# Patient Record
Sex: FEMALE | Race: BLACK OR AFRICAN AMERICAN | Employment: OTHER | URBAN - METROPOLITAN AREA
[De-identification: names, ages, dates, MRNs, and addresses within clinical notes are randomized per-mention and may not be internally consistent; named-entity substitution may affect disease eponyms.]

---

## 2017-12-02 PROBLEM — S05.02XA LEFT CORNEAL ABRASION: Status: ACTIVE | Noted: 2017-12-02

## 2018-01-02 PROBLEM — E10.10 DKA, TYPE 1, NOT AT GOAL (HCC): Status: ACTIVE | Noted: 2018-01-02

## 2018-01-05 PROBLEM — S75.001A COMMON FEMORAL ARTERY INJURY, RIGHT, INITIAL ENCOUNTER: Status: ACTIVE | Noted: 2018-01-05

## 2018-01-05 PROBLEM — L76.82 BLEEDING AT INSERTION SITE: Status: ACTIVE | Noted: 2018-01-05

## 2018-04-11 ENCOUNTER — TELEPHONE (OUTPATIENT)
Dept: OBGYN | Age: 23
End: 2018-04-11

## 2018-04-12 ENCOUNTER — TELEPHONE (OUTPATIENT)
Dept: OBGYN | Age: 23
End: 2018-04-12

## 2018-04-17 ENCOUNTER — OFFICE VISIT (OUTPATIENT)
Dept: OBGYN | Age: 23
End: 2018-04-17
Payer: COMMERCIAL

## 2018-04-17 ENCOUNTER — TELEPHONE (OUTPATIENT)
Dept: OBGYN | Age: 23
End: 2018-04-17

## 2018-04-17 ENCOUNTER — HOSPITAL ENCOUNTER (OUTPATIENT)
Age: 23
Discharge: HOME | End: 2018-04-19
Payer: COMMERCIAL

## 2018-04-17 VITALS
HEART RATE: 123 BPM | WEIGHT: 118 LBS | DIASTOLIC BLOOD PRESSURE: 81 MMHG | HEIGHT: 61 IN | RESPIRATION RATE: 14 BRPM | SYSTOLIC BLOOD PRESSURE: 116 MMHG | TEMPERATURE: 99.7 F | BODY MASS INDEX: 22.28 KG/M2

## 2018-04-17 DIAGNOSIS — N89.8 VAGINA ITCHING: ICD-10-CM

## 2018-04-17 DIAGNOSIS — N76.0 BACTERIAL VAGINAL INFECTION: Primary | ICD-10-CM

## 2018-04-17 DIAGNOSIS — B96.89 BACTERIAL VAGINAL INFECTION: Primary | ICD-10-CM

## 2018-04-17 DIAGNOSIS — Z11.3 ROUTINE SCREENING FOR STI (SEXUALLY TRANSMITTED INFECTION): ICD-10-CM

## 2018-04-17 DIAGNOSIS — R30.0 DYSURIA: Primary | ICD-10-CM

## 2018-04-17 DIAGNOSIS — B37.31 VULVOVAGINAL CANDIDIASIS: ICD-10-CM

## 2018-04-17 LAB
BILIRUBIN, POC: NEGATIVE
BLOOD URINE, POC: NEGATIVE
CLARITY, POC: CLEAR
CLUE CELLS: ABNORMAL
COLOR, POC: YELLOW
GLUCOSE URINE, POC: 250
KETONES, POC: NEGATIVE
LEUKOCYTE EST, POC: ABNORMAL
NITRITE, POC: NEGATIVE
PH, POC: 6
PROTEIN, POC: NEGATIVE
SOURCE WET PREP: ABNORMAL
SPECIFIC GRAVITY, POC: 1.02
TRICHOMONAS PREP: ABNORMAL
UROBILINOGEN, POC: NEGATIVE
YEAST WET PREP: ABNORMAL

## 2018-04-17 PROCEDURE — 99213 OFFICE O/P EST LOW 20 MIN: CPT | Performed by: NURSE PRACTITIONER

## 2018-04-17 PROCEDURE — 1036F TOBACCO NON-USER: CPT | Performed by: NURSE PRACTITIONER

## 2018-04-17 PROCEDURE — 87491 CHLMYD TRACH DNA AMP PROBE: CPT

## 2018-04-17 PROCEDURE — G8420 CALC BMI NORM PARAMETERS: HCPCS | Performed by: NURSE PRACTITIONER

## 2018-04-17 PROCEDURE — 87210 SMEAR WET MOUNT SALINE/INK: CPT | Performed by: NURSE PRACTITIONER

## 2018-04-17 PROCEDURE — 99213 OFFICE O/P EST LOW 20 MIN: CPT

## 2018-04-17 PROCEDURE — 87186 SC STD MICRODIL/AGAR DIL: CPT

## 2018-04-17 PROCEDURE — 81002 URINALYSIS NONAUTO W/O SCOPE: CPT | Performed by: NURSE PRACTITIONER

## 2018-04-17 PROCEDURE — 87591 N.GONORRHOEAE DNA AMP PROB: CPT

## 2018-04-17 PROCEDURE — G8427 DOCREV CUR MEDS BY ELIG CLIN: HCPCS | Performed by: NURSE PRACTITIONER

## 2018-04-17 PROCEDURE — 87210 SMEAR WET MOUNT SALINE/INK: CPT

## 2018-04-17 PROCEDURE — 87088 URINE BACTERIA CULTURE: CPT

## 2018-04-17 RX ORDER — FLUCONAZOLE 150 MG/1
150 TABLET ORAL
Qty: 1 TABLET | Refills: 0 | Status: SHIPPED | OUTPATIENT
Start: 2018-04-17 | End: 2018-04-24

## 2018-04-17 RX ORDER — NITROFURANTOIN 25; 75 MG/1; MG/1
100 CAPSULE ORAL 2 TIMES DAILY
Qty: 20 CAPSULE | Refills: 0 | Status: SHIPPED | OUTPATIENT
Start: 2018-04-17 | End: 2018-04-27

## 2018-04-17 RX ORDER — NYSTATIN 100000 U/G
CREAM TOPICAL
Qty: 1 TUBE | Refills: 0 | Status: SHIPPED | OUTPATIENT
Start: 2018-04-17 | End: 2018-08-01 | Stop reason: ALTCHOICE

## 2018-04-17 RX ORDER — METRONIDAZOLE 500 MG/1
500 TABLET ORAL 2 TIMES DAILY
Qty: 14 TABLET | Refills: 0 | Status: SHIPPED | OUTPATIENT
Start: 2018-04-17 | End: 2018-04-24

## 2018-04-17 ASSESSMENT — ENCOUNTER SYMPTOMS: GASTROINTESTINAL NEGATIVE: 1

## 2018-04-18 ENCOUNTER — TELEPHONE (OUTPATIENT)
Dept: OBGYN | Age: 23
End: 2018-04-18

## 2018-04-19 LAB
ORGANISM: ABNORMAL
URINE CULTURE, ROUTINE: ABNORMAL
URINE CULTURE, ROUTINE: ABNORMAL

## 2018-04-23 ENCOUNTER — TELEPHONE (OUTPATIENT)
Dept: OBGYN | Age: 23
End: 2018-04-23

## 2018-04-23 LAB
CHLAMYDIA TRACHOMATIS AMPLIFIED DET: ABNORMAL
ORGANISM: ABNORMAL

## 2018-04-24 ENCOUNTER — TELEPHONE (OUTPATIENT)
Dept: OBGYN | Age: 23
End: 2018-04-24

## 2018-04-24 ENCOUNTER — NURSE ONLY (OUTPATIENT)
Dept: OBGYN | Age: 23
End: 2018-04-24
Payer: COMMERCIAL

## 2018-04-24 VITALS
HEIGHT: 61 IN | WEIGHT: 117 LBS | DIASTOLIC BLOOD PRESSURE: 76 MMHG | SYSTOLIC BLOOD PRESSURE: 119 MMHG | BODY MASS INDEX: 22.09 KG/M2 | TEMPERATURE: 97.4 F

## 2018-04-24 DIAGNOSIS — A54.9 GC (GONOCOCCUS INFECTION): Primary | ICD-10-CM

## 2018-04-24 PROCEDURE — 99212 OFFICE O/P EST SF 10 MIN: CPT

## 2018-04-24 PROCEDURE — 2580000003 HC RX 258

## 2018-04-24 PROCEDURE — 6370000000 HC RX 637 (ALT 250 FOR IP)

## 2018-04-24 PROCEDURE — 99211 OFF/OP EST MAY X REQ PHY/QHP: CPT | Performed by: OBSTETRICS & GYNECOLOGY

## 2018-04-24 PROCEDURE — 6360000002 HC RX W HCPCS

## 2018-04-24 RX ORDER — AZITHROMYCIN 250 MG/1
1000 TABLET, FILM COATED ORAL ONCE
Status: COMPLETED | OUTPATIENT
Start: 2018-04-24 | End: 2018-04-24

## 2018-04-24 RX ORDER — CEFTRIAXONE SODIUM 250 MG/1
250 INJECTION, POWDER, FOR SOLUTION INTRAMUSCULAR; INTRAVENOUS ONCE
Status: COMPLETED | OUTPATIENT
Start: 2018-04-24 | End: 2018-04-24

## 2018-04-24 RX ADMIN — CEFTRIAXONE SODIUM 250 MG: 250 INJECTION, POWDER, FOR SOLUTION INTRAMUSCULAR; INTRAVENOUS at 16:04

## 2018-04-24 RX ADMIN — AZITHROMYCIN 1000 MG: 250 TABLET, FILM COATED ORAL at 16:03

## 2018-05-07 ENCOUNTER — APPOINTMENT (OUTPATIENT)
Dept: GENERAL RADIOLOGY | Age: 23
DRG: 566 | End: 2018-05-07
Payer: COMMERCIAL

## 2018-05-07 ENCOUNTER — HOSPITAL ENCOUNTER (INPATIENT)
Age: 23
LOS: 2 days | Discharge: HOME | DRG: 566 | End: 2018-05-09
Attending: EMERGENCY MEDICINE | Admitting: INTERNAL MEDICINE
Payer: COMMERCIAL

## 2018-05-07 ENCOUNTER — APPOINTMENT (OUTPATIENT)
Dept: ULTRASOUND IMAGING | Age: 23
DRG: 566 | End: 2018-05-07
Payer: COMMERCIAL

## 2018-05-07 DIAGNOSIS — E10.10 DIABETIC KETOACIDOSIS WITHOUT COMA ASSOCIATED WITH TYPE 1 DIABETES MELLITUS (HCC): Primary | ICD-10-CM

## 2018-05-07 DIAGNOSIS — O03.9 SPONTANEOUS ABORTION: ICD-10-CM

## 2018-05-07 DIAGNOSIS — E87.29 HIGH ANION GAP METABOLIC ACIDOSIS: ICD-10-CM

## 2018-05-07 DIAGNOSIS — Z34.90 PREGNANCY, UNSPECIFIED GESTATIONAL AGE: ICD-10-CM

## 2018-05-07 PROBLEM — Z32.01 PREGNANCY TEST POSITIVE: Status: ACTIVE | Noted: 2018-05-07

## 2018-05-07 PROBLEM — L76.82 BLEEDING AT INSERTION SITE: Status: RESOLVED | Noted: 2018-01-05 | Resolved: 2018-05-07

## 2018-05-07 PROBLEM — S75.001A COMMON FEMORAL ARTERY INJURY, RIGHT, INITIAL ENCOUNTER: Status: RESOLVED | Noted: 2018-01-05 | Resolved: 2018-05-07

## 2018-05-07 PROBLEM — S05.02XA LEFT CORNEAL ABRASION: Status: RESOLVED | Noted: 2017-12-02 | Resolved: 2018-05-07

## 2018-05-07 LAB
ABO/RH: NORMAL
ALBUMIN SERPL-MCNC: 3.8 G/DL (ref 3.5–5.2)
ALP BLD-CCNC: 72 U/L (ref 35–104)
ALT SERPL-CCNC: 8 U/L (ref 0–32)
ANION GAP SERPL CALCULATED.3IONS-SCNC: 13 MMOL/L (ref 7–16)
ANION GAP SERPL CALCULATED.3IONS-SCNC: 14 MMOL/L (ref 7–16)
ANION GAP SERPL CALCULATED.3IONS-SCNC: 20 MMOL/L (ref 7–16)
ANION GAP SERPL CALCULATED.3IONS-SCNC: 25 MMOL/L (ref 7–16)
ANION GAP SERPL CALCULATED.3IONS-SCNC: 28 MMOL/L (ref 7–16)
ANTIBODY SCREEN: NORMAL
AST SERPL-CCNC: 11 U/L (ref 0–31)
BACTERIA: ABNORMAL /HPF
BASOPHILS ABSOLUTE: 0.04 E9/L (ref 0–0.2)
BASOPHILS RELATIVE PERCENT: 0.3 % (ref 0–2)
BETA-HYDROXYBUTYRATE: >4.5 MMOL/L (ref 0.02–0.27)
BILIRUB SERPL-MCNC: 0.2 MG/DL (ref 0–1.2)
BILIRUBIN URINE: NEGATIVE
BLOOD, URINE: ABNORMAL
BUN BLDV-MCNC: 13 MG/DL (ref 6–20)
BUN BLDV-MCNC: 16 MG/DL (ref 6–20)
BUN BLDV-MCNC: 5 MG/DL (ref 6–20)
BUN BLDV-MCNC: 6 MG/DL (ref 6–20)
BUN BLDV-MCNC: 8 MG/DL (ref 6–20)
CALCIUM SERPL-MCNC: 7.8 MG/DL (ref 8.6–10.2)
CALCIUM SERPL-MCNC: 8 MG/DL (ref 8.6–10.2)
CALCIUM SERPL-MCNC: 8 MG/DL (ref 8.6–10.2)
CALCIUM SERPL-MCNC: 8.4 MG/DL (ref 8.6–10.2)
CALCIUM SERPL-MCNC: 9.1 MG/DL (ref 8.6–10.2)
CHLORIDE BLD-SCNC: 104 MMOL/L (ref 98–107)
CHLORIDE BLD-SCNC: 105 MMOL/L (ref 98–107)
CHLORIDE BLD-SCNC: 105 MMOL/L (ref 98–107)
CHLORIDE BLD-SCNC: 106 MMOL/L (ref 98–107)
CHLORIDE BLD-SCNC: 97 MMOL/L (ref 98–107)
CHP ED QC CHECK: NORMAL
CHP ED QC CHECK: YES
CHP ED QC CHECK: YES
CLARITY: CLEAR
CO2: 11 MMOL/L (ref 22–29)
CO2: 11 MMOL/L (ref 22–29)
CO2: 15 MMOL/L (ref 22–29)
CO2: 17 MMOL/L (ref 22–29)
CO2: 9 MMOL/L (ref 22–29)
COLOR: ABNORMAL
CREAT SERPL-MCNC: 0.5 MG/DL (ref 0.5–1)
CREAT SERPL-MCNC: 0.6 MG/DL (ref 0.5–1)
EKG ATRIAL RATE: 110 BPM
EKG P AXIS: 87 DEGREES
EKG P-R INTERVAL: 136 MS
EKG Q-T INTERVAL: 336 MS
EKG QRS DURATION: 70 MS
EKG QTC CALCULATION (BAZETT): 454 MS
EKG R AXIS: 63 DEGREES
EKG T AXIS: 18 DEGREES
EKG VENTRICULAR RATE: 110 BPM
EOSINOPHILS ABSOLUTE: 0.01 E9/L (ref 0.05–0.5)
EOSINOPHILS RELATIVE PERCENT: 0.1 % (ref 0–6)
EPITHELIAL CELLS, UA: ABNORMAL /HPF
GFR AFRICAN AMERICAN: >60
GFR NON-AFRICAN AMERICAN: >60 ML/MIN/1.73
GLUCOSE BLD-MCNC: 121 MG/DL (ref 74–109)
GLUCOSE BLD-MCNC: 172 MG/DL (ref 74–109)
GLUCOSE BLD-MCNC: 223 MG/DL
GLUCOSE BLD-MCNC: 225 MG/DL
GLUCOSE BLD-MCNC: 235 MG/DL (ref 74–109)
GLUCOSE BLD-MCNC: 239 MG/DL
GLUCOSE BLD-MCNC: 253 MG/DL
GLUCOSE BLD-MCNC: 258 MG/DL
GLUCOSE BLD-MCNC: 309 MG/DL (ref 74–109)
GLUCOSE BLD-MCNC: 317 MG/DL (ref 74–109)
GLUCOSE BLD-MCNC: 320 MG/DL (ref 74–109)
GLUCOSE URINE: 500 MG/DL
GONADOTROPIN, CHORIONIC (HCG) QUANT: 5236 MIU/ML
HBA1C MFR BLD: 14.3 % (ref 4.8–5.9)
HCT VFR BLD CALC: 38 % (ref 34–48)
HEMOGLOBIN: 12.6 G/DL (ref 11.5–15.5)
IMMATURE GRANULOCYTES #: 0.04 E9/L
IMMATURE GRANULOCYTES %: 0.3 % (ref 0–5)
KETONES, URINE: >=80 MG/DL
LACTIC ACID: 0.9 MMOL/L (ref 0.5–2.2)
LEUKOCYTE ESTERASE, URINE: ABNORMAL
LIPASE: 8 U/L (ref 13–60)
LYMPHOCYTES ABSOLUTE: 1.7 E9/L (ref 1.5–4)
LYMPHOCYTES RELATIVE PERCENT: 13.3 % (ref 20–42)
MAGNESIUM: 1.4 MG/DL (ref 1.6–2.6)
MAGNESIUM: 1.5 MG/DL (ref 1.6–2.6)
MAGNESIUM: 1.7 MG/DL (ref 1.6–2.6)
MAGNESIUM: 1.9 MG/DL (ref 1.6–2.6)
MAGNESIUM: 2.3 MG/DL (ref 1.6–2.6)
MCH RBC QN AUTO: 25.3 PG (ref 26–35)
MCHC RBC AUTO-ENTMCNC: 33.2 % (ref 32–34.5)
MCV RBC AUTO: 76.3 FL (ref 80–99.9)
METER GLUCOSE: 113 MG/DL (ref 70–110)
METER GLUCOSE: 126 MG/DL (ref 70–110)
METER GLUCOSE: 126 MG/DL (ref 70–110)
METER GLUCOSE: 127 MG/DL (ref 70–110)
METER GLUCOSE: 127 MG/DL (ref 70–110)
METER GLUCOSE: 148 MG/DL (ref 70–110)
METER GLUCOSE: 163 MG/DL (ref 70–110)
METER GLUCOSE: 166 MG/DL (ref 70–110)
METER GLUCOSE: 168 MG/DL (ref 70–110)
METER GLUCOSE: 223 MG/DL (ref 70–110)
METER GLUCOSE: 225 MG/DL (ref 70–110)
METER GLUCOSE: 239 MG/DL (ref 70–110)
METER GLUCOSE: 253 MG/DL (ref 70–110)
METER GLUCOSE: 258 MG/DL (ref 70–110)
MONOCYTES ABSOLUTE: 0.41 E9/L (ref 0.1–0.95)
MONOCYTES RELATIVE PERCENT: 3.2 % (ref 2–12)
NEUTROPHILS ABSOLUTE: 10.57 E9/L (ref 1.8–7.3)
NEUTROPHILS RELATIVE PERCENT: 82.8 % (ref 43–80)
NITRITE, URINE: NEGATIVE
PDW BLD-RTO: 18.4 FL (ref 11.5–15)
PH UA: 6 (ref 5–9)
PH VENOUS: 7.24 (ref 7.3–7.42)
PHOSPHORUS: 2.5 MG/DL (ref 2.5–4.5)
PHOSPHORUS: 2.6 MG/DL (ref 2.5–4.5)
PHOSPHORUS: 2.7 MG/DL (ref 2.5–4.5)
PHOSPHORUS: 3.5 MG/DL (ref 2.5–4.5)
PHOSPHORUS: 4.3 MG/DL (ref 2.5–4.5)
PLATELET # BLD: 259 E9/L (ref 130–450)
PMV BLD AUTO: 11.7 FL (ref 7–12)
POC CHLORIDE: 113 MMOL/L (ref 100–108)
POC CREATININE: 0.5 MG/DL (ref 0.5–1)
POC POTASSIUM: 3.9 MMOL/L (ref 3.5–5)
POC SODIUM: 135 MMOL/L (ref 132–146)
POTASSIUM SERPL-SCNC: 3.7 MMOL/L (ref 3.5–5)
POTASSIUM SERPL-SCNC: 3.9 MMOL/L (ref 3.5–5)
POTASSIUM SERPL-SCNC: 4.1 MMOL/L (ref 3.5–5)
POTASSIUM SERPL-SCNC: 4.3 MMOL/L (ref 3.5–5)
POTASSIUM SERPL-SCNC: 4.3 MMOL/L (ref 3.5–5)
PREGNANCY TEST URINE, POC: NORMAL
PROTEIN UA: 30 MG/DL
RBC # BLD: 4.98 E12/L (ref 3.5–5.5)
RBC UA: ABNORMAL /HPF (ref 0–2)
RHIG LOT NUMBER: NORMAL
SODIUM BLD-SCNC: 135 MMOL/L (ref 132–146)
SODIUM BLD-SCNC: 135 MMOL/L (ref 132–146)
SODIUM BLD-SCNC: 136 MMOL/L (ref 132–146)
SODIUM BLD-SCNC: 136 MMOL/L (ref 132–146)
SODIUM BLD-SCNC: 138 MMOL/L (ref 132–146)
SPECIFIC GRAVITY UA: >=1.03 (ref 1–1.03)
TOTAL PROTEIN: 7 G/DL (ref 6.4–8.3)
UROBILINOGEN, URINE: 0.2 E.U./DL
WBC # BLD: 12.8 E9/L (ref 4.5–11.5)
WBC UA: ABNORMAL /HPF (ref 0–5)

## 2018-05-07 PROCEDURE — 83690 ASSAY OF LIPASE: CPT

## 2018-05-07 PROCEDURE — 82435 ASSAY OF BLOOD CHLORIDE: CPT

## 2018-05-07 PROCEDURE — 85025 COMPLETE CBC W/AUTO DIFF WBC: CPT

## 2018-05-07 PROCEDURE — 96375 TX/PRO/DX INJ NEW DRUG ADDON: CPT

## 2018-05-07 PROCEDURE — 6360000002 HC RX W HCPCS: Performed by: EMERGENCY MEDICINE

## 2018-05-07 PROCEDURE — 86901 BLOOD TYPING SEROLOGIC RH(D): CPT

## 2018-05-07 PROCEDURE — 71045 X-RAY EXAM CHEST 1 VIEW: CPT

## 2018-05-07 PROCEDURE — 6370000000 HC RX 637 (ALT 250 FOR IP): Performed by: INTERNAL MEDICINE

## 2018-05-07 PROCEDURE — 6360000002 HC RX W HCPCS

## 2018-05-07 PROCEDURE — 76817 TRANSVAGINAL US OBSTETRIC: CPT

## 2018-05-07 PROCEDURE — 80048 BASIC METABOLIC PNL TOTAL CA: CPT

## 2018-05-07 PROCEDURE — 87088 URINE BACTERIA CULTURE: CPT

## 2018-05-07 PROCEDURE — 6370000000 HC RX 637 (ALT 250 FOR IP): Performed by: EMERGENCY MEDICINE

## 2018-05-07 PROCEDURE — 93005 ELECTROCARDIOGRAM TRACING: CPT | Performed by: EMERGENCY MEDICINE

## 2018-05-07 PROCEDURE — 2500000003 HC RX 250 WO HCPCS: Performed by: EMERGENCY MEDICINE

## 2018-05-07 PROCEDURE — 82565 ASSAY OF CREATININE: CPT

## 2018-05-07 PROCEDURE — 94761 N-INVAS EAR/PLS OXIMETRY MLT: CPT

## 2018-05-07 PROCEDURE — 36415 COLL VENOUS BLD VENIPUNCTURE: CPT

## 2018-05-07 PROCEDURE — 99222 1ST HOSP IP/OBS MODERATE 55: CPT | Performed by: INTERNAL MEDICINE

## 2018-05-07 PROCEDURE — 87491 CHLMYD TRACH DNA AMP PROBE: CPT

## 2018-05-07 PROCEDURE — 99291 CRITICAL CARE FIRST HOUR: CPT

## 2018-05-07 PROCEDURE — 83735 ASSAY OF MAGNESIUM: CPT

## 2018-05-07 PROCEDURE — 86703 HIV-1/HIV-2 1 RESULT ANTBDY: CPT

## 2018-05-07 PROCEDURE — 83036 HEMOGLOBIN GLYCOSYLATED A1C: CPT

## 2018-05-07 PROCEDURE — 96365 THER/PROPH/DIAG IV INF INIT: CPT

## 2018-05-07 PROCEDURE — 96368 THER/DIAG CONCURRENT INF: CPT

## 2018-05-07 PROCEDURE — 2580000003 HC RX 258: Performed by: EMERGENCY MEDICINE

## 2018-05-07 PROCEDURE — 96372 THER/PROPH/DIAG INJ SC/IM: CPT

## 2018-05-07 PROCEDURE — 82947 ASSAY GLUCOSE BLOOD QUANT: CPT

## 2018-05-07 PROCEDURE — 82800 BLOOD PH: CPT

## 2018-05-07 PROCEDURE — 96366 THER/PROPH/DIAG IV INF ADDON: CPT

## 2018-05-07 PROCEDURE — 82962 GLUCOSE BLOOD TEST: CPT

## 2018-05-07 PROCEDURE — 81001 URINALYSIS AUTO W/SCOPE: CPT

## 2018-05-07 PROCEDURE — 86900 BLOOD TYPING SEROLOGIC ABO: CPT

## 2018-05-07 PROCEDURE — 84100 ASSAY OF PHOSPHORUS: CPT

## 2018-05-07 PROCEDURE — 86592 SYPHILIS TEST NON-TREP QUAL: CPT

## 2018-05-07 PROCEDURE — 86850 RBC ANTIBODY SCREEN: CPT

## 2018-05-07 PROCEDURE — 84295 ASSAY OF SERUM SODIUM: CPT

## 2018-05-07 PROCEDURE — 84132 ASSAY OF SERUM POTASSIUM: CPT

## 2018-05-07 PROCEDURE — 83605 ASSAY OF LACTIC ACID: CPT

## 2018-05-07 PROCEDURE — 96376 TX/PRO/DX INJ SAME DRUG ADON: CPT

## 2018-05-07 PROCEDURE — 82010 KETONE BODYS QUAN: CPT

## 2018-05-07 PROCEDURE — 80053 COMPREHEN METABOLIC PANEL: CPT

## 2018-05-07 PROCEDURE — 2000000000 HC ICU R&B

## 2018-05-07 PROCEDURE — 87591 N.GONORRHOEAE DNA AMP PROB: CPT

## 2018-05-07 PROCEDURE — 2580000003 HC RX 258: Performed by: INTERNAL MEDICINE

## 2018-05-07 PROCEDURE — 84702 CHORIONIC GONADOTROPIN TEST: CPT

## 2018-05-07 RX ORDER — 0.9 % SODIUM CHLORIDE 0.9 %
1000 INTRAVENOUS SOLUTION INTRAVENOUS ONCE
Status: COMPLETED | OUTPATIENT
Start: 2018-05-07 | End: 2018-05-07

## 2018-05-07 RX ORDER — SODIUM CHLORIDE 9 MG/ML
INJECTION, SOLUTION INTRAVENOUS CONTINUOUS
Status: DISCONTINUED | OUTPATIENT
Start: 2018-05-07 | End: 2018-05-09 | Stop reason: HOSPADM

## 2018-05-07 RX ORDER — NICOTINE POLACRILEX 4 MG
15 LOZENGE BUCCAL PRN
Status: DISCONTINUED | OUTPATIENT
Start: 2018-05-07 | End: 2018-05-09 | Stop reason: HOSPADM

## 2018-05-07 RX ORDER — MAGNESIUM SULFATE 1 G/100ML
1 INJECTION INTRAVENOUS PRN
Status: DISCONTINUED | OUTPATIENT
Start: 2018-05-07 | End: 2018-05-09 | Stop reason: HOSPADM

## 2018-05-07 RX ORDER — 0.9 % SODIUM CHLORIDE 0.9 %
15 INTRAVENOUS SOLUTION INTRAVENOUS ONCE
Status: COMPLETED | OUTPATIENT
Start: 2018-05-07 | End: 2018-05-07

## 2018-05-07 RX ORDER — MORPHINE SULFATE 2 MG/ML
2 INJECTION, SOLUTION INTRAMUSCULAR; INTRAVENOUS ONCE
Status: COMPLETED | OUTPATIENT
Start: 2018-05-08 | End: 2018-05-07

## 2018-05-07 RX ORDER — MORPHINE SULFATE 2 MG/ML
2 INJECTION, SOLUTION INTRAMUSCULAR; INTRAVENOUS ONCE
Status: COMPLETED | OUTPATIENT
Start: 2018-05-07 | End: 2018-05-07

## 2018-05-07 RX ORDER — DEXTROSE MONOHYDRATE 25 G/50ML
12.5 INJECTION, SOLUTION INTRAVENOUS PRN
Status: DISCONTINUED | OUTPATIENT
Start: 2018-05-07 | End: 2018-05-09 | Stop reason: HOSPADM

## 2018-05-07 RX ORDER — DEXTROSE MONOHYDRATE 50 MG/ML
100 INJECTION, SOLUTION INTRAVENOUS PRN
Status: DISCONTINUED | OUTPATIENT
Start: 2018-05-07 | End: 2018-05-09 | Stop reason: HOSPADM

## 2018-05-07 RX ORDER — DEXTROSE, SODIUM CHLORIDE, AND POTASSIUM CHLORIDE 5; .45; .15 G/100ML; G/100ML; G/100ML
INJECTION INTRAVENOUS CONTINUOUS PRN
Status: DISCONTINUED | OUTPATIENT
Start: 2018-05-07 | End: 2018-05-09 | Stop reason: HOSPADM

## 2018-05-07 RX ORDER — INSULIN GLARGINE 100 [IU]/ML
30 INJECTION, SOLUTION SUBCUTANEOUS NIGHTLY
Status: DISCONTINUED | OUTPATIENT
Start: 2018-05-07 | End: 2018-05-09 | Stop reason: HOSPADM

## 2018-05-07 RX ORDER — MORPHINE SULFATE 2 MG/ML
INJECTION, SOLUTION INTRAMUSCULAR; INTRAVENOUS
Status: COMPLETED
Start: 2018-05-07 | End: 2018-05-07

## 2018-05-07 RX ORDER — SODIUM CHLORIDE 0.9 % (FLUSH) 0.9 %
10 SYRINGE (ML) INJECTION PRN
Status: DISCONTINUED | OUTPATIENT
Start: 2018-05-07 | End: 2018-05-09 | Stop reason: HOSPADM

## 2018-05-07 RX ORDER — MAGNESIUM SULFATE IN WATER 40 MG/ML
2 INJECTION, SOLUTION INTRAVENOUS
Status: DISCONTINUED | OUTPATIENT
Start: 2018-05-07 | End: 2018-05-07

## 2018-05-07 RX ORDER — POTASSIUM CHLORIDE 7.45 MG/ML
10 INJECTION INTRAVENOUS PRN
Status: DISCONTINUED | OUTPATIENT
Start: 2018-05-07 | End: 2018-05-09 | Stop reason: HOSPADM

## 2018-05-07 RX ORDER — SODIUM CHLORIDE 0.9 % (FLUSH) 0.9 %
10 SYRINGE (ML) INJECTION 2 TIMES DAILY
Status: DISCONTINUED | OUTPATIENT
Start: 2018-05-07 | End: 2018-05-09 | Stop reason: HOSPADM

## 2018-05-07 RX ORDER — MORPHINE SULFATE 4 MG/ML
4 INJECTION, SOLUTION INTRAMUSCULAR; INTRAVENOUS ONCE
Status: COMPLETED | OUTPATIENT
Start: 2018-05-07 | End: 2018-05-07

## 2018-05-07 RX ORDER — 0.9 % SODIUM CHLORIDE 0.9 %
30 INTRAVENOUS SOLUTION INTRAVENOUS ONCE
Status: COMPLETED | OUTPATIENT
Start: 2018-05-07 | End: 2018-05-07

## 2018-05-07 RX ORDER — ONDANSETRON 2 MG/ML
4 INJECTION INTRAMUSCULAR; INTRAVENOUS ONCE
Status: COMPLETED | OUTPATIENT
Start: 2018-05-07 | End: 2018-05-07

## 2018-05-07 RX ADMIN — HUMAN RHO(D) IMMUNE GLOBULIN 300 MCG: 300 INJECTION, SOLUTION INTRAMUSCULAR at 10:16

## 2018-05-07 RX ADMIN — SODIUM CHLORIDE 5.8 UNITS/HR: 9 INJECTION, SOLUTION INTRAVENOUS at 11:02

## 2018-05-07 RX ADMIN — DEXTROSE MONOHYDRATE, SODIUM CHLORIDE, AND POTASSIUM CHLORIDE: 50; 4.5; 1.49 INJECTION, SOLUTION INTRAVENOUS at 15:50

## 2018-05-07 RX ADMIN — POTASSIUM CHLORIDE 10 MEQ: 10 INJECTION, SOLUTION INTRAVENOUS at 21:50

## 2018-05-07 RX ADMIN — MAGNESIUM SULFATE HEPTAHYDRATE 1 G: 1 INJECTION, SOLUTION INTRAVENOUS at 17:44

## 2018-05-07 RX ADMIN — DEXTROSE MONOHYDRATE, SODIUM CHLORIDE, AND POTASSIUM CHLORIDE: 50; 4.5; 1.49 INJECTION, SOLUTION INTRAVENOUS at 09:31

## 2018-05-07 RX ADMIN — MAGNESIUM SULFATE HEPTAHYDRATE 1 G: 1 INJECTION, SOLUTION INTRAVENOUS at 16:49

## 2018-05-07 RX ADMIN — MORPHINE SULFATE 2 MG: 2 INJECTION, SOLUTION INTRAMUSCULAR; INTRAVENOUS at 07:28

## 2018-05-07 RX ADMIN — ONDANSETRON 4 MG: 2 INJECTION INTRAMUSCULAR; INTRAVENOUS at 05:25

## 2018-05-07 RX ADMIN — MORPHINE SULFATE 4 MG: 4 INJECTION INTRAVENOUS at 05:25

## 2018-05-07 RX ADMIN — POTASSIUM CHLORIDE 10 MEQ: 10 INJECTION, SOLUTION INTRAVENOUS at 08:27

## 2018-05-07 RX ADMIN — INSULIN GLARGINE 30 UNITS: 100 INJECTION, SOLUTION SUBCUTANEOUS at 21:30

## 2018-05-07 RX ADMIN — POTASSIUM CHLORIDE 10 MEQ: 10 INJECTION, SOLUTION INTRAVENOUS at 09:05

## 2018-05-07 RX ADMIN — POTASSIUM CHLORIDE 10 MEQ: 10 INJECTION, SOLUTION INTRAVENOUS at 21:16

## 2018-05-07 RX ADMIN — SODIUM CHLORIDE 783 ML: 9 INJECTION, SOLUTION INTRAVENOUS at 09:39

## 2018-05-07 RX ADMIN — SODIUM CHLORIDE 1566 ML: 9 INJECTION, SOLUTION INTRAVENOUS at 06:59

## 2018-05-07 RX ADMIN — SODIUM CHLORIDE 1000 ML: 9 INJECTION, SOLUTION INTRAVENOUS at 08:08

## 2018-05-07 RX ADMIN — Medication 10 ML: at 20:35

## 2018-05-07 RX ADMIN — POTASSIUM CHLORIDE 10 MEQ: 10 INJECTION, SOLUTION INTRAVENOUS at 09:40

## 2018-05-07 RX ADMIN — MORPHINE SULFATE 2 MG: 2 INJECTION, SOLUTION INTRAMUSCULAR; INTRAVENOUS at 23:58

## 2018-05-07 RX ADMIN — Medication 10 ML: at 15:44

## 2018-05-07 RX ADMIN — Medication 10 ML: at 18:48

## 2018-05-07 RX ADMIN — POTASSIUM CHLORIDE 10 MEQ: 10 INJECTION, SOLUTION INTRAVENOUS at 22:34

## 2018-05-07 RX ADMIN — CEFTRIAXONE SODIUM 1 G: 1 INJECTION, POWDER, FOR SOLUTION INTRAMUSCULAR; INTRAVENOUS at 09:41

## 2018-05-07 RX ADMIN — SODIUM CHLORIDE 5.2 UNITS/HR: 9 INJECTION, SOLUTION INTRAVENOUS at 08:22

## 2018-05-07 RX ADMIN — SODIUM PHOSPHATE, MONOBASIC, MONOHYDRATE 10 MMOL: 276; 142 INJECTION, SOLUTION INTRAVENOUS at 21:45

## 2018-05-07 RX ADMIN — Medication 10 ML: at 22:59

## 2018-05-07 ASSESSMENT — ENCOUNTER SYMPTOMS
VOMITING: 1
EYE REDNESS: 0
EYE DISCHARGE: 0
NAUSEA: 1
SINUS PRESSURE: 0
ABDOMINAL PAIN: 1
BACK PAIN: 0
COUGH: 0
WHEEZING: 0
SHORTNESS OF BREATH: 0
SORE THROAT: 0
ABDOMINAL DISTENTION: 0
EYE PAIN: 0
DIARRHEA: 0

## 2018-05-07 ASSESSMENT — PAIN DESCRIPTION - FREQUENCY
FREQUENCY: CONTINUOUS
FREQUENCY: CONTINUOUS

## 2018-05-07 ASSESSMENT — PAIN DESCRIPTION - ORIENTATION: ORIENTATION: LOWER

## 2018-05-07 ASSESSMENT — PAIN SCALES - GENERAL
PAINLEVEL_OUTOF10: 10
PAINLEVEL_OUTOF10: 9
PAINLEVEL_OUTOF10: 8
PAINLEVEL_OUTOF10: 0
PAINLEVEL_OUTOF10: 8
PAINLEVEL_OUTOF10: 0
PAINLEVEL_OUTOF10: 7
PAINLEVEL_OUTOF10: 7

## 2018-05-07 ASSESSMENT — PAIN DESCRIPTION - PROGRESSION: CLINICAL_PROGRESSION: NOT CHANGED

## 2018-05-07 ASSESSMENT — PAIN DESCRIPTION - LOCATION
LOCATION: ABDOMEN
LOCATION: ABDOMEN

## 2018-05-07 ASSESSMENT — PAIN DESCRIPTION - DESCRIPTORS
DESCRIPTORS: ACHING
DESCRIPTORS: ACHING

## 2018-05-07 ASSESSMENT — PAIN DESCRIPTION - PAIN TYPE
TYPE: ACUTE PAIN
TYPE: ACUTE PAIN

## 2018-05-07 NOTE — PROGRESS NOTES
Attending Physician Statement  I have discussed the case, including pertinent history and exam findings with the resident. I have seen and examined the patient and the key elements of the encounter have been performed by me. I agree with the assessment, plan and orders as documented by the resident. DKA - noncompliant and appears to be newly pregnant with US and 5K bhcg - avoid teratogenic agents - insulin drip started and pt sx much improved already - follow up GYN consult for high risk pregnancy. Bassem Chavez D.O.  Board Certified Internal Medicine   5/7/2018 1:51 PM

## 2018-05-07 NOTE — ED NOTES
Spoke with blood bank and they stated ID and labeled blood was acceptable and will be ran.      Kailee Dickinson, PAGE  05/07/18 2159

## 2018-05-07 NOTE — H&P
[facility]  Internal Medicine Residency Program  History and Physical    Patient:  Viviane Fitzpatrick 23 y.o. female MRN: [id number]     Date of Service: 5/7/2018          Chief complaint: vomiting, abdominal pain    History of Present Illness   The patient is a 23 y.o. female who came in for vomiting and abdominal pain. She has type 1 DM (since 7 yo, hx of noncompliance with hx of multiple admissions for DKA), recent gonococcal infection. She says she has not been taking her nightly insulin for about 3 days. 2 days ago, she started to have crampy abdominal pain associated with multiple episodes of vomiting, poor oral intake. She denies fever, cough, colds, chills, chest pain, SOB, focal weakness/changes in sensation, intake of meds apart from those prescribed for recent GC infection and UTI. Last admission for DKA was in January and her last a1c was 15. In the ED, patient was noted to be in DKA (elevated blood sugars, elevated betahydroxybutyrate, (+) AG, metabolic acidosis). Insulin protocol initiated. Ceftriaxone started for UTI. Her LMP was Feb 14, 2018. BetaHCG elevated. She did not know previously that she was pregnant. She has been experiencing vaginal bleeding since May 2. Past Medical History:  DM type 1    Past Surgical History:    History reviewed. No pertinent surgical history. Medications Prior to Admission:    Prior to Admission medications    Medication Sig Start Date End Date Taking? Authorizing Provider   nystatin (MYCOSTATIN) 199056 UNIT/GM cream Apply topically 2 times daily.  4/17/18  Yes MIKE Chaudhry CNM   lisinopril (PRINIVIL;ZESTRIL) 2.5 MG tablet Take 1 tablet by mouth daily 1/25/18  Yes Chandana Olson MD   insulin aspart (NOVOLOG) 100 UNIT/ML injection vial Take 4 units plus  **Corrective Low Dose Algorithm**  Glucose: Dose:               No Insulin  140-199 1 Unit  200-249 2 Units  250-299 3 Units  300-349 4 Units  350-399 5 Units  Over Soft. She exhibits no distension. There is no tenderness. There is no guarding. Musculoskeletal: She exhibits no edema or deformity. Lymphadenopathy:     She has no cervical adenopathy. Neurological: She is alert. Skin: Skin is warm. Labs and Imaging Studies     CBC:   Lab Results   Component Value Date    WBC 12.8 05/07/2018    RBC 4.98 05/07/2018    HGB 12.6 05/07/2018    HCT 38.0 05/07/2018    MCV 76.3 05/07/2018    RDW 18.4 05/07/2018     05/07/2018     CMP:  Lab Results   Component Value Date     05/07/2018    K 4.3 05/07/2018    K 4.2 01/24/2018     05/07/2018    CO2 11 05/07/2018    BUN 8 05/07/2018    PROT 7.0 05/07/2018     Betahydroxy butyrate > 4.5    BetaHCG - 5236    Imaging Studies:     Us Ob Transvaginal  Result Date: 5/7/2018  1. Anechoic collection in the lower uterine segment/upper cervix which may represent an early gestational sac, in the appropriate clinical scenario, or complex fluid collection. Recommend clinical correlation with serial beta hCG levels. 2. Unremarkable transvaginal sonographic evaluation of the right and left ovaries. Xr Chest Portable  Result Date: 5/7/2018  No radiographic evidence of acute cardiopulmonary disease. See above. Resident's Assessment and Plan     DM Type 1 in DKA 2/2 non-compliance  - Has not taken her long-acting insulin for about 3 day. - Admit to MICU and continue DKA protocol  - Labs per DKA protocol, replaced electrolytes prn  - rpt A1c  - NPO for now  - Bridge when AG closed x 2 with insulin glargine 30 u once a day    HAGMA from DKA  - Manage DKA as above    Early pregnancy  - OB-Gyn consult for high-risk pregnancy (Type 1 DM, currently with vaginal bleeding, US with early gestational sac)  - Obtain HIV, RPR  - Start folic acid once diet resumed  - Avoid teratogenics   - Will avoid scheduling anti-emetics/NSAIDs. Evaluate need for anti-emetics/NSAIDs prn.     UTI  - Asymptomatic but will treat in light of

## 2018-05-07 NOTE — ED NOTES
5:04 PM    I received this patient at sign out from Dr. Munir Thakkar   I have discussed the patient's initial exam, treatment and plan of care with the out going physician. I have introduced my self to the patient / family and have answered their questions to this point. I have examined the patient myself and reviewed ordered tests / medications and reviewed any available results to this point. If a resident is involved in the Emergency Department care, I have discussed my findings and plan with them as well. DKA  Critically ill  DKA protocol started  CVC placed by night doc and resident  Just found + pregnancy test today  LMP over 1 month ago  Vaginal bleeding but nothing here  US with +IUP and no FF  [facility]  She is RH neg, Rhogam given  ICU [facility] accepts  Dr. Weaver Sender to admit    CRITICAL CARE:  Please note that the withdrawal or failure to initiate urgent interventions for this patient would likely result in a life threatening deterioration or permanent disability. Accordingly this patient received 75 minutes of critical care time, excluding separately billable procedures.          Mary Jane Valdes MD  05/07/18 1633

## 2018-05-07 NOTE — H&P
[address] [facility]   Department of Internal Medicine   Internal Medicine Residency  MICU H&P Note    Patient:  Janey Renae 23 y.o. female   MRN: [id number]       Date of Service: 5/7/2018      Chief Complaint:  Vomiting, and lower abdominal pain    History of Present Illness   - The History is taken from patient    - The patient is a 23 y.o. female  w hx of Type I DM (since age of 8) on insulin, and recent GC + STD (treated with Rocephin and Azithromycin) presented to ED with cc of vomiting and lower abdominal pain. She started vomiting about 2 days ago, with stomach content, denied hematemesis. She also complained of lower abdominal pain, no constipation or diarrhea, she reported she thought that was because of her menstrual period. During past 2 days, she had very light vaginal bleeding, not as her regular menstrual period, her last normal menstrual period was Feb.  She reported she was recently treated for UTI, of the clinic note, she had GC+ and treated with Rocephin and azithromycin. She denied dysuria, hematuria. She denied fever chills. She reported she had missed some insulin during the past couple of days because she slept earlier, so she skipped. Back to Jan this year, she was admitted for DKA with poorly controled DM, HbA1c >15. In ED, she was found DKA, DKA protocol was initiated. She was also found pregnant, she said she didn't know this in the past, reported her last normal menstrual period was in Feb.     Past Medical History:      Diagnosis Date    Bleeding at insertion site 1/5/2018    Common femoral artery injury, right, initial encounter 1/5/2018    Diabetes mellitus ([facility] [address]        Past Surgical History:    History reviewed. No pertinent surgical history. Prior to Admission medications    Medication Sig Start Date End Date Taking? Authorizing Provider   nystatin (MYCOSTATIN) 600649 UNIT/GM cream Apply topically 2 times daily.  4/17/18  Yes MIKE Costello CNM kg/m²   General Appearance:    Alert, cooperative, no acute distress. HEENT:   NC/AT, PERRLA, no pallor no icterus, moist mucous membrane   Neck:   Supple, Normal thyroid; no carotid bruit or JVD   Resp:     CTAB, no wheezes no rhonchi   Heart:    RRR, S1 & S2 normal, no murmur, rub or gallop. Abdomen:     Tenderness in the pelvis area. Marrufo sign negative.    Extremities:   Normal, atraumatic, no cyanosis or edema   Pulses:   2+ and symmetric all extremities   Skin:   Skin color, texture, turgor normal, no rashes or lesions   Neurologic:   AAOx3, no focal motor deficit     Labs     CBC:   Lab Results   Component Value Date    WBC 12.8 05/07/2018    RBC 4.98 05/07/2018    HGB 12.6 05/07/2018    HCT 38.0 05/07/2018     05/07/2018    MCV 76.3 05/07/2018     BMP:    Lab Results   Component Value Date     05/07/2018    K 4.3 05/07/2018    K 4.2 01/24/2018     05/07/2018    CO2 11 05/07/2018    BUN 8 05/07/2018    CREATININE 0.5 05/07/2018    GLUCOSE 235 05/07/2018    CALCIUM 7.8 05/07/2018     Hepatic Function Panel:    Lab Results   Component Value Date    ALKPHOS 72 05/07/2018    AST 11 05/07/2018    ALT 8 05/07/2018    PROT 7.0 05/07/2018    LABALBU 3.8 05/07/2018    BILITOT 0.2 05/07/2018     Cardiac Enzymes:   Lab Results   Component Value Date    TROPONINI <0.01 01/02/2018          Culture  Date sent  Result    Nasal      Sputum      Urine Strep pneumonia Ag        Urine Legionella Ag        Blood        Urine  5/7        Antibiotic  Days  Day started     Rocephin     5/7                             Oxygen:   Nasal cannula L/min     Face mask %     Reservoirs mask %       ABG   Vent Settings     PH   Mode      PCO2   TV      PO2   RR      HCO3   PS      Sat%   PEEP      FIO2   FIO2        P/F          Lines:  Site  Day  Date inserted     TLC              PICC              Arterial line              Peripheral line                           DVT Prophylaxis      Heparin         Enoxaparin with Residents. Additionally comprehensive, multidisciplinary rounds were conducted with the MICU team. The case was discussed in detail and plans for care were established. Review of Residents documentation was conducted and revisions were made as appropriate. I agree with the above documented exam, problem list and plan of care.   Lisha Wolf D.O.  CCT 41 min

## 2018-05-07 NOTE — ED PROVIDER NOTES
showed good, free flowing blood return and were flushed with saline solution. The catheter was then securely fastened to the skin with suture at 15 cm. Two sutures were placed into the proximal eyelets. An antibiotic disk was placed and the site was then covered with a sterile dressing. A post procedure X-ray was ordered and showed good line position. The patient tolerated the procedure well. Complications: None          MDM  Number of Diagnoses or Management Options  Diagnosis management comments: 82-year-old female type I diabetic with multiple episodes of DKA presenting to the emergency department for primary complaint of hyperglycemia, patient is tachycardic with tachypnea, complaining of abdominal pain. Workup initiated to evaluate for DKA, empirically treat with IV fluid rehydration as well as antiemetics and medication for pain. EKG: This EKG is signed and interpreted by me. Rate: 110  Rhythm: Sinus  Interpretation: Sinus tachycardia, good R wave progression, normal axis  Comparison: Normal compared to previous EKGs size from sinus tachycardia.    --------------------------------------------- PAST HISTORY ---------------------------------------------  Past Medical History:  has a past medical history of Bleeding at insertion site; Common femoral artery injury, right, initial encounter; and Diabetes mellitus ([facility] [address] Past Surgical History:  has no past surgical history on file. Social History:  reports that she has never smoked. She has never used smokeless tobacco. She reports that she does not drink alcohol or use drugs. Family History: family history is not on file. The patients home medications have been reviewed. Allergies: Patient has no known allergies.     -------------------------------------------------- RESULTS -------------------------------------------------    LABS:  Results for orders placed or performed during the hospital encounter of 05/07/18 Beta-Hydroxybutyrate   Result Value Ref Range    Beta-Hydroxybutyrate >4.50 (H) 0.02 - 0.27 mmol/L   CBC Auto Differential   Result Value Ref Range    WBC 12.8 (H) 4.5 - 11.5 E9/L    RBC 4.98 3.50 - 5.50 E12/L    Hemoglobin 12.6 11.5 - 15.5 g/dL    Hematocrit 38.0 34.0 - 48.0 %    MCV 76.3 (L) 80.0 - 99.9 fL    MCH 25.3 (L) 26.0 - 35.0 pg    MCHC 33.2 32.0 - 34.5 %    RDW 18.4 (H) 11.5 - 15.0 fL    Platelets 862 263 - 305 E9/L    MPV 11.7 7.0 - 12.0 fL    Neutrophils % 82.8 (H) 43.0 - 80.0 %    Immature Granulocytes % 0.3 0.0 - 5.0 %    Lymphocytes % 13.3 (L) 20.0 - 42.0 %    Monocytes % 3.2 2.0 - 12.0 %    Eosinophils % 0.1 0.0 - 6.0 %    Basophils % 0.3 0.0 - 2.0 %    Neutrophils # 10.57 (H) 1.80 - 7.30 E9/L    Immature Granulocytes # 0.04 E9/L    Lymphocytes # 1.70 1.50 - 4.00 E9/L    Monocytes # 0.41 0.10 - 0.95 E9/L    Eosinophils # 0.01 (L) 0.05 - 0.50 E9/L    Basophils # 0.04 0.00 - 0.20 E9/L   Comprehensive Metabolic Panel   Result Value Ref Range    Sodium 136 132 - 146 mmol/L    Potassium 4.1 3.5 - 5.0 mmol/L    Chloride 97 (L) 98 - 107 mmol/L    CO2 11 (L) 22 - 29 mmol/L    Anion Gap 28 (H) 7 - 16 mmol/L    Glucose 317 (H) 74 - 109 mg/dL    BUN 16 6 - 20 mg/dL    CREATININE 0.6 0.5 - 1.0 mg/dL    GFR Non-African American >60 >=60 mL/min/1.73    GFR African American >60     Calcium 9.1 8.6 - 10.2 mg/dL    Total Protein 7.0 6.4 - 8.3 g/dL    Alb 3.8 3.5 - 5.2 g/dL    Total Bilirubin 0.2 0.0 - 1.2 mg/dL    Alkaline Phosphatase 72 35 - 104 U/L    ALT 8 0 - 32 U/L    AST 11 0 - 31 U/L   Lipase   Result Value Ref Range    Lipase 8 (L) 13 - 60 U/L   Urinalysis   Result Value Ref Range    Color, UA Straw Straw/Yellow    Clarity, UA Clear Clear    Glucose, Ur 500 (A) Negative mg/dL    Bilirubin Urine Negative Negative    Ketones, Urine >=80 (A) Negative mg/dL    Specific Gravity, UA >=1.030 1.005 - 1.030    Blood, Urine LARGE (A) Negative    pH, UA 6.0 5.0 - 9.0    Protein, UA 30 (A) Negative mg/dL Urobilinogen, Urine 0.2 <2.0 E.U./dL    Nitrite, Urine Negative Negative    Leukocyte Esterase, Urine SMALL (A) Negative   pH, venous   Result Value Ref Range    pH, Christoph 7.24 (L) 7.30 - 7.42   Magnesium   Result Value Ref Range    Magnesium 1.7 1.6 - 2.6 mg/dL   Phosphorus   Result Value Ref Range    Phosphorus 4.3 2.5 - 4.5 mg/dL   Lactic Acid, Plasma   Result Value Ref Range    Lactic Acid 0.9 0.5 - 2.2 mmol/L   HCG, Quantitative, Pregnancy   Result Value Ref Range    hCG Quant 5236.0 (H) <10 mIU/mL   Microscopic Urinalysis   Result Value Ref Range    WBC, UA 1-3 0 - 5 /HPF    RBC, UA 10-20 (A) 0 - 2 /HPF    Epi Cells MODERATE /HPF    Bacteria, UA MODERATE (A) /HPF   Basic Metabolic Panel   Result Value Ref Range    Sodium 138 132 - 146 mmol/L    Potassium 4.3 3.5 - 5.0 mmol/L    Chloride 104 98 - 107 mmol/L    CO2 9 (LL) 22 - 29 mmol/L    Anion Gap 25 (H) 7 - 16 mmol/L    Glucose 309 (H) 74 - 109 mg/dL    BUN 13 6 - 20 mg/dL    CREATININE 0.5 0.5 - 1.0 mg/dL    GFR Non-African American >60 >=60 mL/min/1.73    GFR African American >60     Calcium 8.0 (L) 8.6 - 10.2 mg/dL   Basic Metabolic Panel   Result Value Ref Range    Sodium 136 132 - 146 mmol/L    Potassium 4.3 3.5 - 5.0 mmol/L    Chloride 105 98 - 107 mmol/L    CO2 11 (L) 22 - 29 mmol/L    Anion Gap 20 (H) 7 - 16 mmol/L    Glucose 235 (H) 74 - 109 mg/dL    BUN 8 6 - 20 mg/dL    CREATININE 0.5 0.5 - 1.0 mg/dL    GFR Non-African American >60 >=60 mL/min/1.73    GFR African American >60     Calcium 7.8 (L) 8.6 - 10.2 mg/dL   Basic Metabolic Panel   Result Value Ref Range    Sodium 135 132 - 146 mmol/L    Potassium 3.7 3.5 - 5.0 mmol/L    Chloride 106 98 - 107 mmol/L    CO2 15 (L) 22 - 29 mmol/L    Anion Gap 14 7 - 16 mmol/L    Glucose 121 (H) 74 - 109 mg/dL    BUN 6 6 - 20 mg/dL    CREATININE 0.5 0.5 - 1.0 mg/dL    GFR Non-African American >60 >=60 mL/min/1.73    GFR African American >60     Calcium 8.0 (L) 8.6 - 10.2 mg/dL   Magnesium   Result Value Ref Range

## 2018-05-08 PROBLEM — O03.9 SPONTANEOUS ABORTION: Status: ACTIVE | Noted: 2018-05-07

## 2018-05-08 LAB
ANION GAP SERPL CALCULATED.3IONS-SCNC: 12 MMOL/L (ref 7–16)
ANION GAP SERPL CALCULATED.3IONS-SCNC: 12 MMOL/L (ref 7–16)
BASOPHILS ABSOLUTE: 0.04 E9/L (ref 0–0.2)
BASOPHILS RELATIVE PERCENT: 0.4 % (ref 0–2)
BUN BLDV-MCNC: 3 MG/DL (ref 6–20)
BUN BLDV-MCNC: 3 MG/DL (ref 6–20)
CALCIUM SERPL-MCNC: 8.2 MG/DL (ref 8.6–10.2)
CALCIUM SERPL-MCNC: 9 MG/DL (ref 8.6–10.2)
CHLORIDE BLD-SCNC: 104 MMOL/L (ref 98–107)
CHLORIDE BLD-SCNC: 106 MMOL/L (ref 98–107)
CO2: 17 MMOL/L (ref 22–29)
CO2: 17 MMOL/L (ref 22–29)
CREAT SERPL-MCNC: 0.5 MG/DL (ref 0.5–1)
CREAT SERPL-MCNC: 0.5 MG/DL (ref 0.5–1)
EOSINOPHILS ABSOLUTE: 0.06 E9/L (ref 0.05–0.5)
EOSINOPHILS RELATIVE PERCENT: 0.6 % (ref 0–6)
GFR AFRICAN AMERICAN: >60
GFR AFRICAN AMERICAN: >60
GFR NON-AFRICAN AMERICAN: >60 ML/MIN/1.73
GFR NON-AFRICAN AMERICAN: >60 ML/MIN/1.73
GLUCOSE BLD-MCNC: 127 MG/DL (ref 74–109)
GLUCOSE BLD-MCNC: 190 MG/DL (ref 74–109)
GONADOTROPIN, CHORIONIC (HCG) QUANT: 2155 MIU/ML
HCT VFR BLD CALC: 33.3 % (ref 34–48)
HEMOGLOBIN: 11 G/DL (ref 11.5–15.5)
HIV-1 AND HIV-2 ANTIBODIES: NORMAL
IMMATURE GRANULOCYTES #: 0.03 E9/L
IMMATURE GRANULOCYTES %: 0.3 % (ref 0–5)
LYMPHOCYTES ABSOLUTE: 3.85 E9/L (ref 1.5–4)
LYMPHOCYTES RELATIVE PERCENT: 35.3 % (ref 20–42)
MAGNESIUM: 1.8 MG/DL (ref 1.6–2.6)
MAGNESIUM: 1.8 MG/DL (ref 1.6–2.6)
MCH RBC QN AUTO: 25.1 PG (ref 26–35)
MCHC RBC AUTO-ENTMCNC: 33 % (ref 32–34.5)
MCV RBC AUTO: 75.9 FL (ref 80–99.9)
METER GLUCOSE: 199 MG/DL (ref 70–110)
METER GLUCOSE: 234 MG/DL (ref 70–110)
METER GLUCOSE: 253 MG/DL (ref 70–110)
METER GLUCOSE: 66 MG/DL (ref 70–110)
MONOCYTES ABSOLUTE: 0.73 E9/L (ref 0.1–0.95)
MONOCYTES RELATIVE PERCENT: 6.7 % (ref 2–12)
NEUTROPHILS ABSOLUTE: 6.19 E9/L (ref 1.8–7.3)
NEUTROPHILS RELATIVE PERCENT: 56.7 % (ref 43–80)
PDW BLD-RTO: 18.9 FL (ref 11.5–15)
PHOSPHORUS: 3.5 MG/DL (ref 2.5–4.5)
PHOSPHORUS: 3.9 MG/DL (ref 2.5–4.5)
PLATELET # BLD: 255 E9/L (ref 130–450)
PMV BLD AUTO: 10.9 FL (ref 7–12)
POTASSIUM SERPL-SCNC: 3.8 MMOL/L (ref 3.5–5)
POTASSIUM SERPL-SCNC: 4 MMOL/L (ref 3.5–5)
RBC # BLD: 4.39 E12/L (ref 3.5–5.5)
RPR: NORMAL
SODIUM BLD-SCNC: 133 MMOL/L (ref 132–146)
SODIUM BLD-SCNC: 135 MMOL/L (ref 132–146)
WBC # BLD: 10.9 E9/L (ref 4.5–11.5)

## 2018-05-08 PROCEDURE — 6360000002 HC RX W HCPCS: Performed by: INTERNAL MEDICINE

## 2018-05-08 PROCEDURE — 84100 ASSAY OF PHOSPHORUS: CPT

## 2018-05-08 PROCEDURE — 6370000000 HC RX 637 (ALT 250 FOR IP): Performed by: INTERNAL MEDICINE

## 2018-05-08 PROCEDURE — 84702 CHORIONIC GONADOTROPIN TEST: CPT

## 2018-05-08 PROCEDURE — 80048 BASIC METABOLIC PNL TOTAL CA: CPT

## 2018-05-08 PROCEDURE — 2580000003 HC RX 258: Performed by: INTERNAL MEDICINE

## 2018-05-08 PROCEDURE — 6360000002 HC RX W HCPCS: Performed by: STUDENT IN AN ORGANIZED HEALTH CARE EDUCATION/TRAINING PROGRAM

## 2018-05-08 PROCEDURE — 99232 SBSQ HOSP IP/OBS MODERATE 35: CPT | Performed by: INTERNAL MEDICINE

## 2018-05-08 PROCEDURE — 87040 BLOOD CULTURE FOR BACTERIA: CPT

## 2018-05-08 PROCEDURE — 6370000000 HC RX 637 (ALT 250 FOR IP): Performed by: STUDENT IN AN ORGANIZED HEALTH CARE EDUCATION/TRAINING PROGRAM

## 2018-05-08 PROCEDURE — 82962 GLUCOSE BLOOD TEST: CPT

## 2018-05-08 PROCEDURE — 83735 ASSAY OF MAGNESIUM: CPT

## 2018-05-08 PROCEDURE — 88305 TISSUE EXAM BY PATHOLOGIST: CPT

## 2018-05-08 PROCEDURE — 85025 COMPLETE CBC W/AUTO DIFF WBC: CPT

## 2018-05-08 PROCEDURE — 36592 COLLECT BLOOD FROM PICC: CPT

## 2018-05-08 PROCEDURE — 1200000000 HC SEMI PRIVATE

## 2018-05-08 PROCEDURE — 36415 COLL VENOUS BLD VENIPUNCTURE: CPT

## 2018-05-08 RX ORDER — DEXTROSE MONOHYDRATE 50 MG/ML
100 INJECTION, SOLUTION INTRAVENOUS PRN
Status: DISCONTINUED | OUTPATIENT
Start: 2018-05-08 | End: 2018-05-09 | Stop reason: HOSPADM

## 2018-05-08 RX ORDER — FERROUS SULFATE 325(65) MG
325 TABLET ORAL DAILY
Status: DISCONTINUED | OUTPATIENT
Start: 2018-05-09 | End: 2018-05-09 | Stop reason: HOSPADM

## 2018-05-08 RX ORDER — DEXTROSE MONOHYDRATE 25 G/50ML
12.5 INJECTION, SOLUTION INTRAVENOUS PRN
Status: DISCONTINUED | OUTPATIENT
Start: 2018-05-08 | End: 2018-05-09 | Stop reason: HOSPADM

## 2018-05-08 RX ORDER — 0.9 % SODIUM CHLORIDE 0.9 %
500 INTRAVENOUS SOLUTION INTRAVENOUS ONCE
Status: COMPLETED | OUTPATIENT
Start: 2018-05-08 | End: 2018-05-08

## 2018-05-08 RX ORDER — MAGNESIUM SULFATE IN WATER 40 MG/ML
2 INJECTION, SOLUTION INTRAVENOUS ONCE
Status: COMPLETED | OUTPATIENT
Start: 2018-05-08 | End: 2018-05-08

## 2018-05-08 RX ORDER — POTASSIUM CHLORIDE 20 MEQ/1
20 TABLET, EXTENDED RELEASE ORAL ONCE
Status: COMPLETED | OUTPATIENT
Start: 2018-05-08 | End: 2018-05-08

## 2018-05-08 RX ORDER — NICOTINE POLACRILEX 4 MG
15 LOZENGE BUCCAL PRN
Status: DISCONTINUED | OUTPATIENT
Start: 2018-05-08 | End: 2018-05-09 | Stop reason: HOSPADM

## 2018-05-08 RX ORDER — MAGNESIUM SULFATE IN WATER 40 MG/ML
2 INJECTION, SOLUTION INTRAVENOUS ONCE
Status: DISCONTINUED | OUTPATIENT
Start: 2018-05-08 | End: 2018-05-08 | Stop reason: SDUPTHER

## 2018-05-08 RX ADMIN — MAGNESIUM SULFATE HEPTAHYDRATE 2 G: 40 INJECTION, SOLUTION INTRAVENOUS at 08:10

## 2018-05-08 RX ADMIN — Medication 10 ML: at 22:08

## 2018-05-08 RX ADMIN — INSULIN LISPRO 2 UNITS: 100 INJECTION, SOLUTION INTRAVENOUS; SUBCUTANEOUS at 20:22

## 2018-05-08 RX ADMIN — SODIUM CHLORIDE 500 ML: 9 INJECTION, SOLUTION INTRAVENOUS at 10:21

## 2018-05-08 RX ADMIN — POTASSIUM CHLORIDE 20 MEQ: 20 TABLET, EXTENDED RELEASE ORAL at 08:10

## 2018-05-08 RX ADMIN — INSULIN LISPRO 2 UNITS: 100 INJECTION, SOLUTION INTRAVENOUS; SUBCUTANEOUS at 17:47

## 2018-05-08 RX ADMIN — INSULIN LISPRO 1 UNITS: 100 INJECTION, SOLUTION INTRAVENOUS; SUBCUTANEOUS at 12:21

## 2018-05-08 RX ADMIN — CEFTRIAXONE SODIUM 1 G: 1 INJECTION, POWDER, FOR SOLUTION INTRAMUSCULAR; INTRAVENOUS at 08:24

## 2018-05-08 RX ADMIN — INSULIN GLARGINE 30 UNITS: 100 INJECTION, SOLUTION SUBCUTANEOUS at 20:22

## 2018-05-08 ASSESSMENT — PAIN SCALES - GENERAL: PAINLEVEL_OUTOF10: 0

## 2018-05-08 NOTE — PROGRESS NOTES
2018     Hemoglobin/Hematocrit:    Lab Results   Component Value Date    HGB 11.0 2018    HCT 33.3 2018     CMP:    Lab Results   Component Value Date     2018    K 3.8 2018    K 4.2 2018     2018    CO2 17 2018    BUN 3 2018    CREATININE 0.5 2018    GFRAA >60 2018    LABGLOM >60 2018    GLUCOSE 127 2018    PROT 7.0 2018    LABALBU 3.8 2018    CALCIUM 9.0 2018    BILITOT 0.2 2018    ALKPHOS 72 2018    AST 11 2018    ALT 8 2018       Imaging:  Us Ob Transvaginal    1. Anechoic collection in the lower uterine segment/upper cervix which may represent an early gestational sac, in the appropriate clinical scenario, or complex fluid collection. Recommend clinical correlation with serial beta hCG levels. 2. Unremarkable transvaginal sonographic evaluation of the right and left ovaries.     Beta HC,236--2155      Student's Assessment and Plan     DKA  -Resolved, patient bridged to home insulin    DM  -Continue home insulin regimen    Asymptomatic Bacteriuria  -Discontinue ceftriaxone    Missed   -Trend betaHCG daily, follow-up with OB-GYN    Microcytic Anemia  -Iron studies on 18 consistent with ANDREW, begin Iron Supplements    Diet  -Carb control        Nicole Jackson 63  Internal Medicine    Attending Physician: Dr. Mauricio Christine

## 2018-05-08 NOTE — PROGRESS NOTES
sugars for today and adjust lantus dose according.   - Will need to optimize DM as outpatient for future pregnancies    HAGMA from DKA, improved    Spontaneous   - Patient continues to have vaginal bleeding + significant drop in beta HCG   - OB-Gyn following for any possible intervention  - Watch our for febrile episodes, severe abdominal pain, leukocytosis    Asymptomatic bacteruria  - Will discontinue antibiotics today    Microcytic anemia  - Iron studies in [place] consistent with ANDREW, will start iron supplements      Diet: carb-control diet  DVT prophylaxis: PCD/diet  Disposition: Transfer to med-surg today. Will likely discharge in 24 hours if sugars are controlled and if there are no planned OB-Gyn intervention          Karin Manzo MD, PGY-1  Attending physician: Dr. Joaquim Patel      Attending Physician Statement  I have discussed the case, including pertinent history and exam findings with the resident. I have seen and examined the patient and the key elements of the encounter have been performed by me. I agree with the assessment, plan and orders as documented by the resident. DKA resolved - transitioned to lantus - reduce dose for hypoglycemia - follow up glucose control     Missed AB - passing some clots no WBC's no fever - would be ok with patient going home tomorrow if ok with [address] Tobi Perry D.O.  Board Certified Internal Medicine   2018 3:17 PM

## 2018-05-08 NOTE — CONSULTS
History      Para Term  AB Living    0 0 0 0 0 0    SAB TAB Ectopic Molar Multiple Live Births    0 0 0 0 0 0        OB History    Para Term  AB Living   0 0 0 0 0 0   SAB TAB Ectopic Molar Multiple Live Births   0 0 0 0 0 0             Past Medical History:        Diagnosis Date    Bleeding at insertion site 2018    Common femoral artery injury, right, initial encounter 2018    Diabetes mellitus ([address]        Past Surgical History:    History reviewed. No pertinent surgical history. Allergies:    Patient has no known allergies. Social History:    Social History     Social History    Marital status: Single     Spouse name: N/A    Number of children: N/A    Years of education: N/A     Occupational History    Not on file. Social History Main Topics    Smoking status: Never Smoker    Smokeless tobacco: Never Used    Alcohol use No    Drug use: No    Sexual activity: Yes     Partners: Male     Other Topics Concern    Not on file     Social History Narrative    No narrative on file       Family History:   History reviewed. No pertinent family history. Medications Prior to Admission:  Prescriptions Prior to Admission: nystatin (MYCOSTATIN) 750359 UNIT/GM cream, Apply topically 2 times daily.   lisinopril (PRINIVIL;ZESTRIL) 2.5 MG tablet, Take 1 tablet by mouth daily  insulin aspart (NOVOLOG) 100 UNIT/ML injection vial, Take 4 units plus **Corrective Low Dose Algorithm** Glucose: Dose:              No Insulin 140-199 1 Unit 200-249 2 Units 250-299 3 Units 300-349 4 Units 350-399 5 Units Over 399 6 Units  insulin glargine (BASAGLAR KWIKPEN) 100 UNIT/ML injection pen, Inject 30 Units into the skin nightly    REVIEW OF SYSTEMS:    Constitutional:  negative  Gastrointestinal:  negative  Genitourinary:  See history of present illness  Integument/breast:  negative  Hematologic/lymphatic:  negative  Endocrine:  IDDM type II poor glycemic control, poorly compliant  Behavior/Psych:  negative    PHYSICAL EXAM:    Vitals:  /84   Pulse 101   Temp 97.7 °F (36.5 °C) (Temporal)   Resp 12   Ht 5' 1\" (1.549 m)   Wt 115 lb (52.2 kg)   LMP 05/03/2018   SpO2 100%   BMI 21.73 kg/m²   CONSTITUTIONAL:  awake, alert, cooperative, no apparent distress, and appears stated age, was sleeping comfortably, easily awoken  EYES:  Lids and lashes normal, pupils equal, round and reactive to light, extra ocular muscles intact, sclera clear, conjunctiva normal  ENT:  Normocephalic, without obvious abnormality, atraumatic, sinuses nontender on palpation, external ears without lesions, oral pharynx with moist mucus membranes, tonsils without erythema or exudates, gums normal and good dentition. NECK:  Supple, symmetrical, trachea midline, no adenopathy, thyroid symmetric, not enlarged and no tenderness, skin normal  HEMATOLOGIC/LYMPHATICS:  no cervical lymphadenopathy and no supraclavicular lymphadenopathy  BACK:  Symmetric, no curvature, spinous processes are non-tender on palpation, paraspinous muscles are non-tender on palpation, no costal vertebral tenderness  LUNGS:  No increased work of breathing, good air exchange, clear to auscultation bilaterally, no crackles or wheezing  CARDIOVASCULAR:  Normal apical impulse, mildly tachycardic - regular rhythm, normal S1 and S2, no S3 or S4, and no murmur noted  ABDOMEN:  No scars, normal bowel sounds, soft, non-distended, diffusely tender across the lower abdomen - no guarding or rebound, no masses palpated, no hepatosplenomegally  CHEST/BREASTS:  Deferred  GENITAL/URINARY:  Deferred  MUSCULOSKELETAL:  There is no redness, warmth, or swelling of the joints. Full range of motion noted. Motor strength is 5 out of 5 all extremities bilaterally. Tone is normal.  NEUROLOGIC:  Awake, alert, oriented to name, place and time. Cranial nerves II-XII are grossly intact. Motor is 5 out of 5 bilaterally.   Cerebellar finger to nose, heel to shin intact. Sensory is intact. Babinski down going, Romberg negative, and gait is normal.    DATA:  Narrative   Patient MRN:  [id number]   : 1995   Age: 23 years   Gender: Female       Order Date:  2018 7:00 AM       EXAM: US OB TRANSVAGINAL       NUMBER OF IMAGES:  61       INDICATION: Vaginal bleeding       Technique: Multiplanar grayscale and color doppler images were   obtained of the uterus and the pelvic adnexa via transvaginal approach   on 2018 7:00 AM.        Comparison: No prior studies available for comparison.       Findings: The uterus demonstrates normal smooth contour and   ultrasonographic appearance, measuring approximately 8.4 cm x 4.0 cm x   4.5 cm. There is an anechoic collection in the lower uterine   segment/upper cervix which contains abnormal internal echoes and does   not demonstrate flow on color Doppler images. The endometrial stripe   measures approximately 8.6 mm in diameter, which is within normal   limits for this age group.        The right ovary measures approximately 2.8 cm x 2.2 cm x 2.4 cm, while   the left ovary measures approximately 2.6 cm x 2.5 cm x 2.0 cm. Normal   follicular structures are demonstrated within the ovaries. No solid   ovarian masses are demonstrated. Normal color doppler flow is   demonstrated in the adnexa bilaterally.        Limited views provided of the urinary bladder are unremarkable. No   free intra-abdominal or pelvic fluid is visualized.         Impression   1. Anechoic collection in the lower uterine segment/upper cervix which   may represent an early gestational sac, in the appropriate clinical   scenario, or complex fluid collection. Recommend clinical correlation   with serial beta hCG levels. 2. Unremarkable transvaginal sonographic evaluation of the right and   left ovaries.      LABS:  Recent Results (from the past 72 hour(s))   CBC Auto Differential    Collection Time: 18  6:27 AM   Result Value Ref Range    WBC 12.8 Collection Time: 18  6:47 AM   Result Value Ref Range    WBC, UA 1-3 0 - 5 /HPF    RBC, UA 10-20 (A) 0 - 2 /HPF    Epi Cells MODERATE /HPF    Bacteria, UA MODERATE (A) /HPF   POC Pregnancy Urine Qual    Collection Time: 18  6:53 AM   Result Value Ref Range    Preg Test, Ur POS     QC OK? YES    TYPE AND SCREEN    Collection Time: 18  7:02 AM   Result Value Ref Range    ABO/Rh A NEG     Antibody Screen NEG    HCG, Quantitative, Pregnancy    Collection Time: 18  7:02 AM   Result Value Ref Range    hCG Quant 5236.0 (H) <10 mIU/mL   RH IMMUNE GLOBULIN    Collection Time: 18  7:02 AM   Result Value Ref Range    RHIG LOT NUMBER       RhImmuneGlobulin    [id number]         issued       1 vial  18 09:39       IMPRESSION/RECOMMENDATIONS:    3 21-year-old  female  1 para 0 at 7 weeks 4 days by last menstrual period in DKA  2. Anechoic collection in the lower uterine segment/upper cervix with a quantitative beta hCG of 5236  - suspect spontaneous  rule out ectopic pregnancy  3. History of gonorrhea testing positive and UTI (E. coli) 2018 status post Rocephin and azithromycin on 2018  4. Rh- maternal blood type, status post RhoGAM in the emergency department    Plan:  1. Serial quantitative beta hCGs every 24 hours over the next 2 days  2. Missed AB and ectopic precautions were reviewed with the patient, the likelihood of this being a viable pregnancy is extremely low. Patient is aware and voices understanding. 3. Manage DKA as per protocol irrespective of the pregnancy due to the nonviable state. 4. If found to be an ectopic pregnancy, consider methotrexate over operative management  5. Discussed the importance of glycemic control preconceptionally to reduce the risk of miscarriage and birth defects.  If the patient is not intending to become pregnant in the immediate future she is encouraged to follow up with her GYN provider or [facility] to initiate contraception. 6. Gonorrhea test of cure obtained in the emergency department. 7. Patient is status post RhoGAM in the emergency department.     Braulio Quarles MD, [facility]  5/7/2018 8:46 PM

## 2018-05-08 NOTE — PROGRESS NOTES
(H) <10 mIU/mL   CBC Auto Differential    Collection Time: 18  8:30 AM   Result Value Ref Range    WBC 10.9 4.5 - 11.5 E9/L    RBC 4.39 3.50 - 5.50 E12/L    Hemoglobin 11.0 (L) 11.5 - 15.5 g/dL    Hematocrit 33.3 (L) 34.0 - 48.0 %    MCV 75.9 (L) 80.0 - 99.9 fL    MCH 25.1 (L) 26.0 - 35.0 pg    MCHC 33.0 32.0 - 34.5 %    RDW 18.9 (H) 11.5 - 15.0 fL    Platelets 381 846 - 363 E9/L    MPV 10.9 7.0 - 12.0 fL    Neutrophils % 56.7 43.0 - 80.0 %    Immature Granulocytes % 0.3 0.0 - 5.0 %    Lymphocytes % 35.3 20.0 - 42.0 %    Monocytes % 6.7 2.0 - 12.0 %    Eosinophils % 0.6 0.0 - 6.0 %    Basophils % 0.4 0.0 - 2.0 %    Neutrophils # 6.19 1.80 - 7.30 E9/L    Immature Granulocytes # 0.03 E9/L    Lymphocytes # 3.85 1.50 - 4.00 E9/L    Monocytes # 0.73 0.10 - 0.95 E9/L    Eosinophils # 0.06 0.05 - 0.50 E9/L    Basophils # 0.04 0.00 - 0.20 E9/L       A:   1. HD #1  23 y.o.  female  1 para 0 at 7 weeks 4 days by last menstrual period in [facility]  2. Anechoic collection in the lower uterine segment/upper cervix with an initial quantitative beta hCG of 5236 that decreased to 2155 in a 24-hour period. The half-life of hCG is 24 hours. This is indicative that there is no viable trophoblast present within the patient. This coincides best with a spontaneous  making an ectopic pregnancy very unlikely. 3. History of gonorrhea testing positive and UTI (E. coli) 2018 status post Rocephin and azithromycin on 2018 - test of cures obtained emergency department are pending  4. Rh- maternal blood type, status post RhoGAM in the emergency department  5. Iron deficiency anemia    Patient Active Problem List   Diagnosis    DKA, type 1, not at goal [facility])    Spontaneous        P:  1. Spontaneous AB precautions were reviewed with the patient.  In light of the small amount of bleeding in the absence of a gestational sac within the uterus by ultrasound, expectant management would serve this patient best. It is expected, that as her quantitative hCG continues to decline she may start bleeding heavier and passed tissue and clots completing the  process. Again this is to be managed expectantly unless bleeding is excessive, or if retained tissue is present as demonstrated by ultrasound follow-up post . This patient is an established patient of the ObGyn inventory clinic at [address] [place] [address] and has been treated both Dr. Fam Valentin and  Vishal Eddy in the past. She was instructed to follow up with her office post discharge. Quantitative beta hCG should be followed to a negative value (<9) to rule out the unlikely diagnosis of choriocarcinoma. 2. The patient states she is not presently interested trying to conceive. She is to follow-up post discharge with either the [facility] ambulatory clinic (Dr Shukla/ POLY Christine) or [facility] to initiate a management plan for family planning for pregnancy prevention, as this was previously provided by Dr. Jaziel Newman who is no longer in practice. 3. Gonorrhea and chlamydia cultures are to be followed in the outpatient setting if they are not returned prior to discharge. 4. Discussed with the patient that she would need improved glycemic control preconceptionally to reduce the risk of recurrent miscarriage and birth defects. This was stressed repeatedly to the patient. She voices complete understanding. 5. In light of the spontaneous  diagnosis, GYN services are no longer necessary at the present time but would be available if needed in the future by a direct call to the on-call physician.       Jeanette Calvo MD FACOG  2018 3:18 PM

## 2018-05-08 NOTE — PROGRESS NOTES
[facility] Quality Flow/Interdisciplinary Rounds Progress Note        Quality Flow Rounds held on May 8, 2018    Disciplines Attending:  Dr Cordelia Cantrell residents Pharmacy respiratory    Rocco Kulkarni was admitted on 5/7/2018  4:58 AM    Anticipated Discharge Date:  Expected Discharge Date: 05/07/18    Disposition:    Gavino Score:  Gavino Scale Score: 22    Readmission Risk              Risk of Unplanned Readmission:        15             Discussed patient goal for the day, patient clinical progression, and barriers to discharge.   The following Goal(s) of the Day/Commitment(s) have been identified:  Transfer to general floor today      Maritza Sebastian  May 8, 2018

## 2018-05-08 NOTE — PROGRESS NOTES
Diabetes education consult noted. KP was sitting up eating breakfast. Stated she does not need any education at this time, she just gets \"lazy\" and sometimes falls asleep before she can take her HS shot. She verbalized understanding of the types and durations of each insulin. Survival packet left at bedside per her request. She stated she is looking into an endocrinologist in area. Encouragement provided to attend individualized diabetes education to update current knowledge. Phone numbers left for future questions/concerns.

## 2018-05-09 VITALS
HEIGHT: 61 IN | TEMPERATURE: 98.1 F | WEIGHT: 115 LBS | DIASTOLIC BLOOD PRESSURE: 81 MMHG | SYSTOLIC BLOOD PRESSURE: 119 MMHG | OXYGEN SATURATION: 100 % | BODY MASS INDEX: 21.71 KG/M2 | HEART RATE: 104 BPM | RESPIRATION RATE: 18 BRPM

## 2018-05-09 LAB
ANION GAP SERPL CALCULATED.3IONS-SCNC: 15 MMOL/L (ref 7–16)
BASOPHILS ABSOLUTE: 0.04 E9/L (ref 0–0.2)
BASOPHILS RELATIVE PERCENT: 0.5 % (ref 0–2)
BUN BLDV-MCNC: 10 MG/DL (ref 6–20)
CALCIUM SERPL-MCNC: 8.8 MG/DL (ref 8.6–10.2)
CHLORIDE BLD-SCNC: 103 MMOL/L (ref 98–107)
CO2: 19 MMOL/L (ref 22–29)
CREAT SERPL-MCNC: 0.5 MG/DL (ref 0.5–1)
EOSINOPHILS ABSOLUTE: 0.05 E9/L (ref 0.05–0.5)
EOSINOPHILS RELATIVE PERCENT: 0.6 % (ref 0–6)
GFR AFRICAN AMERICAN: >60
GFR NON-AFRICAN AMERICAN: >60 ML/MIN/1.73
GLUCOSE BLD-MCNC: 78 MG/DL (ref 74–109)
GONADOTROPIN, CHORIONIC (HCG) QUANT: 1365 MIU/ML
HCT VFR BLD CALC: 35.4 % (ref 34–48)
HEMOGLOBIN: 11.6 G/DL (ref 11.5–15.5)
IMMATURE GRANULOCYTES #: 0.03 E9/L
IMMATURE GRANULOCYTES %: 0.3 % (ref 0–5)
LYMPHOCYTES ABSOLUTE: 4.03 E9/L (ref 1.5–4)
LYMPHOCYTES RELATIVE PERCENT: 46.5 % (ref 20–42)
MAGNESIUM: 1.7 MG/DL (ref 1.6–2.6)
MCH RBC QN AUTO: 25.3 PG (ref 26–35)
MCHC RBC AUTO-ENTMCNC: 32.8 % (ref 32–34.5)
MCV RBC AUTO: 77.1 FL (ref 80–99.9)
METER GLUCOSE: 202 MG/DL (ref 70–110)
METER GLUCOSE: 71 MG/DL (ref 70–110)
MONOCYTES ABSOLUTE: 0.56 E9/L (ref 0.1–0.95)
MONOCYTES RELATIVE PERCENT: 6.5 % (ref 2–12)
NEUTROPHILS ABSOLUTE: 3.96 E9/L (ref 1.8–7.3)
NEUTROPHILS RELATIVE PERCENT: 45.6 % (ref 43–80)
ORGANISM: ABNORMAL
PDW BLD-RTO: 19.4 FL (ref 11.5–15)
PHOSPHORUS: 5.6 MG/DL (ref 2.5–4.5)
PLATELET # BLD: 260 E9/L (ref 130–450)
PMV BLD AUTO: 10.9 FL (ref 7–12)
POTASSIUM SERPL-SCNC: 3.3 MMOL/L (ref 3.5–5)
RBC # BLD: 4.59 E12/L (ref 3.5–5.5)
SODIUM BLD-SCNC: 137 MMOL/L (ref 132–146)
URINE CULTURE, ROUTINE: ABNORMAL
URINE CULTURE, ROUTINE: ABNORMAL
WBC # BLD: 8.7 E9/L (ref 4.5–11.5)

## 2018-05-09 PROCEDURE — 84702 CHORIONIC GONADOTROPIN TEST: CPT

## 2018-05-09 PROCEDURE — 6370000000 HC RX 637 (ALT 250 FOR IP): Performed by: INTERNAL MEDICINE

## 2018-05-09 PROCEDURE — 83735 ASSAY OF MAGNESIUM: CPT

## 2018-05-09 PROCEDURE — 2580000003 HC RX 258: Performed by: INTERNAL MEDICINE

## 2018-05-09 PROCEDURE — 84100 ASSAY OF PHOSPHORUS: CPT

## 2018-05-09 PROCEDURE — 99238 HOSP IP/OBS DSCHRG MGMT 30/<: CPT | Performed by: INTERNAL MEDICINE

## 2018-05-09 PROCEDURE — 36415 COLL VENOUS BLD VENIPUNCTURE: CPT

## 2018-05-09 PROCEDURE — 80048 BASIC METABOLIC PNL TOTAL CA: CPT

## 2018-05-09 PROCEDURE — 82962 GLUCOSE BLOOD TEST: CPT

## 2018-05-09 PROCEDURE — 85025 COMPLETE CBC W/AUTO DIFF WBC: CPT

## 2018-05-09 RX ORDER — POTASSIUM CHLORIDE 20 MEQ/1
40 TABLET, EXTENDED RELEASE ORAL ONCE
Status: COMPLETED | OUTPATIENT
Start: 2018-05-09 | End: 2018-05-09

## 2018-05-09 RX ORDER — FERROUS SULFATE 325(65) MG
325 TABLET ORAL DAILY
Qty: 30 TABLET | Refills: 3 | Status: SHIPPED | OUTPATIENT
Start: 2018-05-10 | End: 2018-08-01 | Stop reason: ALTCHOICE

## 2018-05-09 RX ADMIN — POTASSIUM CHLORIDE 40 MEQ: 20 TABLET, EXTENDED RELEASE ORAL at 13:10

## 2018-05-09 RX ADMIN — FERROUS SULFATE TAB 325 MG (65 MG ELEMENTAL FE) 325 MG: 325 (65 FE) TAB at 08:54

## 2018-05-09 RX ADMIN — INSULIN LISPRO 4 UNITS: 100 INJECTION, SOLUTION INTRAVENOUS; SUBCUTANEOUS at 11:35

## 2018-05-09 RX ADMIN — Medication 10 ML: at 08:55

## 2018-05-09 ASSESSMENT — PAIN SCALES - GENERAL
PAINLEVEL_OUTOF10: 3
PAINLEVEL_OUTOF10: 0

## 2018-05-09 ASSESSMENT — PAIN DESCRIPTION - LOCATION: LOCATION: ABDOMEN

## 2018-05-09 ASSESSMENT — PAIN DESCRIPTION - ORIENTATION: ORIENTATION: LOWER

## 2018-05-09 ASSESSMENT — PAIN DESCRIPTION - PAIN TYPE: TYPE: ACUTE PAIN

## 2018-05-09 ASSESSMENT — PAIN DESCRIPTION - DESCRIPTORS: DESCRIPTORS: CRAMPING

## 2018-05-09 NOTE — PROGRESS NOTES
[address] [place] [address]  Department of Internal Medicine  Medical Student Progress Note      Patient:  Erum Garza 23 y.o. female MRN: [id number]     Date of Service: 2018    Allergy: Patient has no known allergies. CC: F/u: DKA, Missed   Subjective     Patient seen and examined. The patient states she continues to improve. She denies any N/V, diarrhea, abdominal pain, SOB, fever or chills. Patient states she had light vaginal bleeding throughout evening without discharge. Interval Hx:  2018: stable for medical floor  2018: Stable for discharge    Objective     Physical Exam:  · Vitals: /81   Pulse 104   Temp 98.1 °F (36.7 °C)   Resp 18   Ht 5' 1\" (1.549 m)   Wt 115 lb (52.2 kg)   LMP 2018   SpO2 100%   BMI 21.73 kg/m²     · I & O - 24hr: No intake/output data recorded. · General Appearance: alert, appears stated age and cooperative  · HEENT:  Head: Normal, normocephalic, atraumatic. · Lung: clear to auscultation bilaterally  · Heart: regular rate and rhythm, S1, S2 normal, no murmur, click, rub or gallop  · Abdomen: soft, mild tenderness diffusely, no organomegaly, normal bowel sounds  · Extremities:  extremities normal, atraumatic, no cyanosis or edema  · Musculokeletal: No joint swelling, no muscle tenderness. ROM normal in all joints of extremities. · Neurologic: Mental status: Alert, oriented, thought content appropriate    Pertinent/ New Labs and Imaging Studies     CBC:   Lab Results   Component Value Date    WBC 8.7 2018    RBC 4.59 2018    HGB 11.6 2018    HCT 35.4 2018    MCV 77.1 2018    MCH 25.3 2018    MCHC 32.8 2018    RDW 19.4 2018     2018    MPV 10.9 2018      Glucose: 78  Beta HC    Imaging:  Us Ob Transvaginal    Result Date: 2018    1.  Anechoic collection in the lower uterine segment/upper cervix which may represent an early gestational sac, in the appropriate clinical scenario, or complex fluid collection. Recommend clinical correlation with serial beta hCG levels. 2. Unremarkable transvaginal sonographic evaluation of the right and left ovaries. Xr Chest Portable    Result Date: 2018  Patient MRN: [id number] : 1995 Age:  23 years Gender: Female Order Date: 2018 5:15 AM Exam: XR CHEST PORTABLE Number of Views: 1 Indication:   Tachypnea, line placement Comparison: Chest radiograph 2018 Findings: There is a normal cardiomediastinal silhouette with stable appearance of a right-sided central line. No pneumothorax, pleural effusion or focal areas of airspace consolidation. No radiographic evidence of acute cardiopulmonary disease. See above. Student's Assessment and Plan       DKA  -Resolved, patient bridged to home insulin  -anion gap continues to improve     DM  -decrease home insulin to 25 units insulin glargine  -glucose remains stable     Asymptomatic Bacteriuria  -Discontinue ceftriaxone     Missed   -Trend betaHCG daily, follow-up with OB-GYN  -monitor for fever, abdominal pain, vaginal discharge, and leukocytosis     Microcytic Anemia  -Iron studies on 18 consistent with ANDREW, begin Iron Supplements     Diet  -Carb control diet      Disposition: Stable for discharge. Follow up with OB-GYN for missed . Follow-up with PCP for DM management.             Nicole Delvalle 63  Internal Medicine    Attending Physician: Dr. Serjio Billingsley

## 2018-05-13 LAB
BLOOD CULTURE, ROUTINE: NORMAL
CULTURE, BLOOD 2: NORMAL

## 2018-05-14 LAB
CHLAMYDIA TRACHOMATIS AMPLIFIED DET: NORMAL
N GONORRHOEAE AMPLIFIED DET: NORMAL

## 2018-05-22 ENCOUNTER — HOSPITAL ENCOUNTER (OUTPATIENT)
Age: 23
Discharge: HOME | End: 2018-05-24
Payer: COMMERCIAL

## 2018-05-22 ENCOUNTER — OFFICE VISIT (OUTPATIENT)
Dept: OBGYN | Age: 23
End: 2018-05-22
Payer: COMMERCIAL

## 2018-05-22 VITALS
TEMPERATURE: 98.7 F | RESPIRATION RATE: 12 BRPM | BODY MASS INDEX: 22.66 KG/M2 | WEIGHT: 120 LBS | SYSTOLIC BLOOD PRESSURE: 120 MMHG | HEIGHT: 61 IN | DIASTOLIC BLOOD PRESSURE: 76 MMHG | HEART RATE: 100 BPM

## 2018-05-22 DIAGNOSIS — E10.10 DKA, TYPE 1, NOT AT GOAL (HCC): ICD-10-CM

## 2018-05-22 DIAGNOSIS — N89.8 VAGINAL DISCHARGE: ICD-10-CM

## 2018-05-22 DIAGNOSIS — O03.9 SPONTANEOUS MISCARRIAGE: ICD-10-CM

## 2018-05-22 DIAGNOSIS — O03.9 SPONTANEOUS MISCARRIAGE: Primary | ICD-10-CM

## 2018-05-22 DIAGNOSIS — B37.31 VULVOVAGINAL CANDIDIASIS: Primary | ICD-10-CM

## 2018-05-22 DIAGNOSIS — Z86.19 HISTORY OF GONORRHEA: ICD-10-CM

## 2018-05-22 LAB
CLUE CELLS: ABNORMAL
SOURCE WET PREP: ABNORMAL
TRICHOMONAS PREP: ABNORMAL
YEAST WET PREP: ABNORMAL

## 2018-05-22 PROCEDURE — 87210 SMEAR WET MOUNT SALINE/INK: CPT

## 2018-05-22 PROCEDURE — 87210 SMEAR WET MOUNT SALINE/INK: CPT | Performed by: NURSE PRACTITIONER

## 2018-05-22 PROCEDURE — 99213 OFFICE O/P EST LOW 20 MIN: CPT | Performed by: NURSE PRACTITIONER

## 2018-05-22 PROCEDURE — 99214 OFFICE O/P EST MOD 30 MIN: CPT | Performed by: NURSE PRACTITIONER

## 2018-05-22 PROCEDURE — 87491 CHLMYD TRACH DNA AMP PROBE: CPT

## 2018-05-22 PROCEDURE — G8420 CALC BMI NORM PARAMETERS: HCPCS | Performed by: NURSE PRACTITIONER

## 2018-05-22 PROCEDURE — 2022F DILAT RTA XM EVC RTNOPTHY: CPT | Performed by: NURSE PRACTITIONER

## 2018-05-22 PROCEDURE — G8427 DOCREV CUR MEDS BY ELIG CLIN: HCPCS | Performed by: NURSE PRACTITIONER

## 2018-05-22 PROCEDURE — 87591 N.GONORRHOEAE DNA AMP PROB: CPT

## 2018-05-22 PROCEDURE — 1036F TOBACCO NON-USER: CPT | Performed by: NURSE PRACTITIONER

## 2018-05-22 PROCEDURE — 1111F DSCHRG MED/CURRENT MED MERGE: CPT | Performed by: NURSE PRACTITIONER

## 2018-05-22 PROCEDURE — 3046F HEMOGLOBIN A1C LEVEL >9.0%: CPT | Performed by: NURSE PRACTITIONER

## 2018-05-22 RX ORDER — FLUCONAZOLE 150 MG/1
150 TABLET ORAL ONCE
Qty: 1 TABLET | Refills: 0 | Status: SHIPPED | OUTPATIENT
Start: 2018-05-22 | End: 2018-05-22

## 2018-05-22 ASSESSMENT — ENCOUNTER SYMPTOMS: GASTROINTESTINAL NEGATIVE: 1

## 2018-05-29 LAB
CHLAMYDIA TRACHOMATIS AMPLIFIED DET: NORMAL
N GONORRHOEAE AMPLIFIED DET: NORMAL

## 2018-05-30 ENCOUNTER — TELEPHONE (OUTPATIENT)
Dept: OBGYN | Age: 23
End: 2018-05-30

## 2018-06-13 ENCOUNTER — TELEPHONE (OUTPATIENT)
Dept: OBGYN | Age: 23
End: 2018-06-13

## 2018-07-18 ENCOUNTER — TELEPHONE (OUTPATIENT)
Dept: OBGYN | Age: 23
End: 2018-07-18

## 2018-08-01 ENCOUNTER — APPOINTMENT (OUTPATIENT)
Dept: GENERAL RADIOLOGY | Age: 23
End: 2018-08-01
Payer: COMMERCIAL

## 2018-08-01 ENCOUNTER — HOSPITAL ENCOUNTER (EMERGENCY)
Age: 23
Discharge: HOME | End: 2018-08-01
Payer: COMMERCIAL

## 2018-08-01 VITALS
DIASTOLIC BLOOD PRESSURE: 69 MMHG | OXYGEN SATURATION: 98 % | SYSTOLIC BLOOD PRESSURE: 119 MMHG | HEART RATE: 93 BPM | TEMPERATURE: 98.7 F | RESPIRATION RATE: 16 BRPM | HEIGHT: 62 IN | WEIGHT: 120 LBS | BODY MASS INDEX: 22.08 KG/M2
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
## 2018-08-01 DIAGNOSIS — S80.02XA CONTUSION OF LEFT KNEE, INITIAL ENCOUNTER: Primary | ICD-10-CM

## 2018-08-01 PROCEDURE — 6370000000 HC RX 637 (ALT 250 FOR IP): Performed by: NURSE PRACTITIONER

## 2018-08-01 PROCEDURE — 73564 X-RAY EXAM KNEE 4 OR MORE: CPT

## 2018-08-01 PROCEDURE — 99284 EMERGENCY DEPT VISIT MOD MDM: CPT

## 2018-08-01 RX ORDER — IBUPROFEN 800 MG/1
800 TABLET ORAL EVERY 6 HOURS PRN
Qty: 16 TABLET | Refills: 0 | Status: SHIPPED | OUTPATIENT
Start: 2018-08-01 | End: 2018-11-09

## 2018-08-01 RX ORDER — IBUPROFEN 800 MG/1
800 TABLET ORAL ONCE
Status: COMPLETED | OUTPATIENT
Start: 2018-08-01 | End: 2018-08-01

## 2018-08-01 RX ADMIN — IBUPROFEN 800 MG: 800 TABLET, FILM COATED ORAL at 15:15

## 2018-08-01 ASSESSMENT — PAIN DESCRIPTION - ORIENTATION: ORIENTATION: LEFT

## 2018-08-01 ASSESSMENT — PAIN SCALES - GENERAL
PAINLEVEL_OUTOF10: 8
PAINLEVEL_OUTOF10: 8

## 2018-08-01 ASSESSMENT — PAIN DESCRIPTION - LOCATION: LOCATION: KNEE

## 2018-08-01 ASSESSMENT — PAIN DESCRIPTION - ONSET: ONSET: GRADUAL

## 2018-08-01 ASSESSMENT — PAIN DESCRIPTION - DESCRIPTORS: DESCRIPTORS: ACHING;THROBBING

## 2018-08-01 ASSESSMENT — PAIN DESCRIPTION - PAIN TYPE: TYPE: ACUTE PAIN

## 2018-08-01 ASSESSMENT — PAIN DESCRIPTION - PROGRESSION: CLINICAL_PROGRESSION: GRADUALLY WORSENING

## 2018-08-01 ASSESSMENT — PAIN DESCRIPTION - FREQUENCY: FREQUENCY: CONTINUOUS
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
## 2018-08-13 ENCOUNTER — TELEPHONE (OUTPATIENT)
Dept: OBGYN | Age: 23
End: 2018-08-13

## 2018-08-23 ENCOUNTER — OFFICE VISIT (OUTPATIENT)
Dept: OBGYN | Age: 23
End: 2018-08-23
Payer: COMMERCIAL

## 2018-08-23 ENCOUNTER — HOSPITAL ENCOUNTER (OUTPATIENT)
Age: 23
Discharge: HOME | End: 2018-08-25
Payer: COMMERCIAL

## 2018-08-23 VITALS
BODY MASS INDEX: 20.98 KG/M2 | WEIGHT: 114 LBS | RESPIRATION RATE: 20 BRPM | SYSTOLIC BLOOD PRESSURE: 115 MMHG | HEIGHT: 62 IN | TEMPERATURE: 97.6 F | DIASTOLIC BLOOD PRESSURE: 83 MMHG | HEART RATE: 109 BPM

## 2018-08-23 DIAGNOSIS — N89.8 VAGINAL DISCHARGE: ICD-10-CM

## 2018-08-23 DIAGNOSIS — Z20.2 POSSIBLE EXPOSURE TO STD: Primary | ICD-10-CM

## 2018-08-23 DIAGNOSIS — N94.10 DYSPAREUNIA IN FEMALE: ICD-10-CM

## 2018-08-23 DIAGNOSIS — Z20.2 POSSIBLE EXPOSURE TO STD: ICD-10-CM

## 2018-08-23 LAB
CLUE CELLS: NORMAL
SOURCE WET PREP: NORMAL
TRICHOMONAS PREP: NORMAL
YEAST WET PREP: NORMAL

## 2018-08-23 PROCEDURE — 1036F TOBACCO NON-USER: CPT | Performed by: NURSE PRACTITIONER

## 2018-08-23 PROCEDURE — 87591 N.GONORRHOEAE DNA AMP PROB: CPT

## 2018-08-23 PROCEDURE — 99213 OFFICE O/P EST LOW 20 MIN: CPT | Performed by: NURSE PRACTITIONER

## 2018-08-23 PROCEDURE — G8427 DOCREV CUR MEDS BY ELIG CLIN: HCPCS | Performed by: NURSE PRACTITIONER

## 2018-08-23 PROCEDURE — G8420 CALC BMI NORM PARAMETERS: HCPCS | Performed by: NURSE PRACTITIONER

## 2018-08-23 PROCEDURE — 87491 CHLMYD TRACH DNA AMP PROBE: CPT

## 2018-08-23 PROCEDURE — 87210 SMEAR WET MOUNT SALINE/INK: CPT

## 2018-08-23 PROCEDURE — 99214 OFFICE O/P EST MOD 30 MIN: CPT | Performed by: NURSE PRACTITIONER

## 2018-08-23 ASSESSMENT — ENCOUNTER SYMPTOMS
DIARRHEA: 0
RESPIRATORY NEGATIVE: 1
VOMITING: 0
NAUSEA: 0
ABDOMINAL PAIN: 0
RECTAL PAIN: 0
CONSTIPATION: 0
ABDOMINAL DISTENTION: 0

## 2018-08-23 NOTE — PROGRESS NOTES
Exam done by Richard Aguayo  Pelvic exam done and a wet prep culture obtained, identified and prepared for transportation to the lab STAT  A GC/Chlamydia culture obtained, identified and prepared for transportation to the lab.   Return in one week for a follow up appointment  Discharge instructions reviewed by Richard Aguayo

## 2018-08-23 NOTE — PATIENT INSTRUCTIONS
Maintain well woman care  Return in one (1) week for a follow up appointment  Please call with any questions or concerns  Discharge instructions reviewed by Madison Liu

## 2018-08-24 LAB
CHLAMYDIA TRACHOMATIS AMPLIFIED DET: NORMAL
N GONORRHOEAE AMPLIFIED DET: NORMAL

## 2018-08-29 ENCOUNTER — TELEPHONE (OUTPATIENT)
Dept: OBGYN | Age: 23
End: 2018-08-29

## 2018-08-29 NOTE — TELEPHONE ENCOUNTER
Called patient regarding her missed appointment today at 10:45am. Advised her to call us back if she would like to reschedule her appointment      . Grant Vance Electronically signed by Gabe Maria on 8/29/2018 at 3:45 PM

## 2018-09-04 ENCOUNTER — OFFICE VISIT (OUTPATIENT)
Dept: OBGYN | Age: 23
End: 2018-09-04
Payer: COMMERCIAL

## 2018-09-04 VITALS
BODY MASS INDEX: 20.49 KG/M2 | WEIGHT: 112 LBS | RESPIRATION RATE: 16 BRPM | TEMPERATURE: 98.4 F | SYSTOLIC BLOOD PRESSURE: 123 MMHG | DIASTOLIC BLOOD PRESSURE: 80 MMHG | HEART RATE: 100 BPM

## 2018-09-04 DIAGNOSIS — N89.8 VAGINAL ITCHING: ICD-10-CM

## 2018-09-04 DIAGNOSIS — Z09 FOLLOW-UP EXAM: Primary | ICD-10-CM

## 2018-09-04 PROCEDURE — 1036F TOBACCO NON-USER: CPT | Performed by: NURSE PRACTITIONER

## 2018-09-04 PROCEDURE — G8427 DOCREV CUR MEDS BY ELIG CLIN: HCPCS | Performed by: NURSE PRACTITIONER

## 2018-09-04 PROCEDURE — 99212 OFFICE O/P EST SF 10 MIN: CPT | Performed by: NURSE PRACTITIONER

## 2018-09-04 PROCEDURE — G8420 CALC BMI NORM PARAMETERS: HCPCS | Performed by: NURSE PRACTITIONER

## 2018-09-04 ASSESSMENT — ENCOUNTER SYMPTOMS
RESPIRATORY NEGATIVE: 1
GASTROINTESTINAL NEGATIVE: 1

## 2018-09-04 NOTE — PROGRESS NOTES
Subjective:      Patient ID: Mello Madden is a 23 y.o. female. [facility]  Patient states she only has mild itching and a small amount of discharge  Patient decline internal exam.  Patient had wet prep, std screening on 08/23/2018 which were all negative    Review of Systems   Constitutional: Negative. Respiratory: Negative. Cardiovascular: Negative. Gastrointestinal: Negative. Genitourinary: Negative for vaginal discharge. Small amount of discharge   Psychiatric/Behavioral: Negative. All other systems reviewed and are negative. 8/23/2018 11:26 AM - Francisco, Mhy Incoming Results From [facility]   Component Results   Component Collected Lab   Trichomonas Prep 08/23/2018 11:00 AM [facility]   None Seen    Yeast, Wet Prep 08/23/2018 11:00 AM [facility]   None Seen    Clue Cells, Wet Prep 08/23/2018 11:00 AM [facility]   None Seen    Source Wet Prep 08/23/2018 11:00 AM [facility]   VAGINAL    Testing Performed By  8/24/2018  2:19 PM - Francisco, Mhy Incoming Results From [facility]   Component Results   Component Collected Lab   C. Trachomatis Amplified 08/23/2018 12:00 PM [address] Lab   Negative for C. Trachomatis rRNA   Results should be interpreted in conjunction with other   laboratory and clinical data available to the clinician. Colton Share Amplified 08/23/2018 12:00 PM [address] Lab   Negative for N. gonorrhoeae rRNA   Results should be interpreted in conjunction with other   laboratory and clinical data available to the clinician. Testing Performed By   Lab - Abbreviation Name Director Address Valid Date Range   73-GZ - [address] Medel MD [address] [place] [address].   Merlyn Craft [address] 08/30/17 1221-Present   Narrative   Source: CRVX       Site:              Lab and Collection     Objective:   Physical Exam

## 2018-09-07 ENCOUNTER — TELEPHONE (OUTPATIENT)
Dept: OBGYN | Age: 23
End: 2018-09-07

## 2018-09-07 NOTE — TELEPHONE ENCOUNTER
Patient called and states that her visit with Muna Landaverde was good and she answered all her questions .

## 2018-10-31 ENCOUNTER — OFFICE VISIT (OUTPATIENT)
Dept: OBGYN | Age: 23
End: 2018-10-31
Payer: COMMERCIAL

## 2018-10-31 ENCOUNTER — HOSPITAL ENCOUNTER (OUTPATIENT)
Age: 23
Discharge: HOME | End: 2018-11-02
Payer: COMMERCIAL

## 2018-10-31 VITALS
DIASTOLIC BLOOD PRESSURE: 88 MMHG | BODY MASS INDEX: 22.28 KG/M2 | HEIGHT: 61 IN | WEIGHT: 118 LBS | SYSTOLIC BLOOD PRESSURE: 116 MMHG | TEMPERATURE: 97.4 F | HEART RATE: 102 BPM | RESPIRATION RATE: 14 BRPM

## 2018-10-31 DIAGNOSIS — B96.89 BV (BACTERIAL VAGINOSIS): ICD-10-CM

## 2018-10-31 DIAGNOSIS — Z20.2 POSSIBLE EXPOSURE TO STD: ICD-10-CM

## 2018-10-31 DIAGNOSIS — N76.0 BV (BACTERIAL VAGINOSIS): ICD-10-CM

## 2018-10-31 DIAGNOSIS — N89.8 VAGINAL ITCHING: Primary | ICD-10-CM

## 2018-10-31 DIAGNOSIS — N89.8 VAGINAL DISCHARGE: ICD-10-CM

## 2018-10-31 LAB
CLUE CELLS: ABNORMAL
CONTROL: POSITIVE
PREGNANCY TEST URINE, POC: NEGATIVE
SOURCE WET PREP: ABNORMAL
TRICHOMONAS PREP: ABNORMAL
YEAST WET PREP: ABNORMAL

## 2018-10-31 PROCEDURE — G8484 FLU IMMUNIZE NO ADMIN: HCPCS | Performed by: NURSE PRACTITIONER

## 2018-10-31 PROCEDURE — 99213 OFFICE O/P EST LOW 20 MIN: CPT | Performed by: NURSE PRACTITIONER

## 2018-10-31 PROCEDURE — G8427 DOCREV CUR MEDS BY ELIG CLIN: HCPCS | Performed by: NURSE PRACTITIONER

## 2018-10-31 PROCEDURE — G8420 CALC BMI NORM PARAMETERS: HCPCS | Performed by: NURSE PRACTITIONER

## 2018-10-31 PROCEDURE — 81025 URINE PREGNANCY TEST: CPT | Performed by: NURSE PRACTITIONER

## 2018-10-31 PROCEDURE — 87210 SMEAR WET MOUNT SALINE/INK: CPT

## 2018-10-31 PROCEDURE — 99214 OFFICE O/P EST MOD 30 MIN: CPT | Performed by: NURSE PRACTITIONER

## 2018-10-31 PROCEDURE — 1036F TOBACCO NON-USER: CPT | Performed by: NURSE PRACTITIONER

## 2018-10-31 RX ORDER — METRONIDAZOLE 500 MG/1
500 TABLET ORAL 2 TIMES DAILY
Qty: 14 TABLET | Refills: 0 | Status: SHIPPED | OUTPATIENT
Start: 2018-10-31 | End: 2018-11-07

## 2018-10-31 RX ORDER — BLOOD-GLUCOSE METER
KIT MISCELLANEOUS
Refills: 2 | COMMUNITY
Start: 2018-08-31 | End: 2019-08-15 | Stop reason: SDUPTHER

## 2018-10-31 RX ORDER — PEN NEEDLE, DIABETIC 32GX 5/32"
NEEDLE, DISPOSABLE MISCELLANEOUS
Refills: 2 | COMMUNITY
Start: 2018-08-31 | End: 2020-05-04

## 2018-10-31 ASSESSMENT — ENCOUNTER SYMPTOMS
ABDOMINAL DISTENTION: 0
RESPIRATORY NEGATIVE: 1
GASTROINTESTINAL NEGATIVE: 1
ABDOMINAL PAIN: 0
CONSTIPATION: 0

## 2018-10-31 NOTE — PROGRESS NOTES
Subjective:      Patient ID: Vidhi Hoang is a 23 y.o. female. HPI:patient being seen for vaginal discharge. Patient stated she has noticed a yellow/ white vaginal discharge for the past 2 weeks. Patient states theer is odor  and itching associated with the discharge. She reports her LMP one weeks ago c heavy but normal flow x5-7d, no bleeding/spotting between periods. Patient states she is sexually active and has a new partner. States does not always use condoms, but knows the importance of using one. Patient denies dysuria,pelvic pain,or  Dyspareunia. Review of Systems   Respiratory: Negative. Cardiovascular: Negative. Gastrointestinal: Negative. Negative for abdominal distention, abdominal pain and constipation. Genitourinary: Positive for vaginal discharge. Negative for dyspareunia, dysuria, flank pain, pelvic pain and vaginal bleeding. Psychiatric/Behavioral: Negative. Objective:   Physical Exam   Constitutional: She is oriented to person, place, and time. She appears well-developed and well-nourished. Cardiovascular: Normal rate and regular rhythm. Pulmonary/Chest: Effort normal and breath sounds normal.   Abdominal: Soft. Bowel sounds are normal.   Genitourinary: Vaginal discharge found. Neurological: She is alert and oriented to person, place, and time. Skin: Skin is warm and dry. Psychiatric: She has a normal mood and affect. Nursing note and vitals reviewed. Assessment:       Diagnosis Orders   1. Vaginal itching  Wet prep, genital    POCT urine pregnancy    metroNIDAZOLE (FLAGYL) 500 MG tablet   2. Vaginal discharge  Wet prep, genital    POCT urine pregnancy    metroNIDAZOLE (FLAGYL) 500 MG tablet   3. Possible exposure to STD  Wet prep, genital    POCT urine pregnancy    metroNIDAZOLE (FLAGYL) 500 MG tablet   4.  BV (bacterial vaginosis)  metroNIDAZOLE (FLAGYL) 500 MG tablet           Plan:     The patient was advised to call if she has any increased

## 2018-11-03 ENCOUNTER — HOSPITAL ENCOUNTER (EMERGENCY)
Age: 23
Discharge: LEFT BEFORE DISCHARGE | End: 2018-11-03
Payer: COMMERCIAL

## 2018-11-03 VITALS
SYSTOLIC BLOOD PRESSURE: 137 MMHG | RESPIRATION RATE: 16 BRPM | OXYGEN SATURATION: 100 % | BODY MASS INDEX: 22.28 KG/M2 | DIASTOLIC BLOOD PRESSURE: 78 MMHG | WEIGHT: 118 LBS | TEMPERATURE: 98.3 F | HEART RATE: 108 BPM | HEIGHT: 61 IN

## 2018-11-03 DIAGNOSIS — E10.65 HYPERGLYCEMIA DUE TO TYPE 1 DIABETES MELLITUS (HCC): ICD-10-CM

## 2018-11-03 DIAGNOSIS — Z78.9 HAS RUN OUT OF MEDICATIONS: Primary | ICD-10-CM

## 2018-11-03 LAB — METER GLUCOSE: >500 MG/DL (ref 70–110)

## 2018-11-03 PROCEDURE — 96372 THER/PROPH/DIAG INJ SC/IM: CPT

## 2018-11-03 PROCEDURE — 6370000000 HC RX 637 (ALT 250 FOR IP)

## 2018-11-03 PROCEDURE — 99281 EMR DPT VST MAYX REQ PHY/QHP: CPT

## 2018-11-03 PROCEDURE — 82962 GLUCOSE BLOOD TEST: CPT

## 2018-11-03 RX ADMIN — INSULIN LISPRO 7 UNITS: 100 INJECTION, SOLUTION INTRAVENOUS; SUBCUTANEOUS at 21:21

## 2018-11-09 ENCOUNTER — HOSPITAL ENCOUNTER (INPATIENT)
Age: 23
LOS: 3 days | Discharge: HOME | DRG: 420 | End: 2018-11-12
Attending: EMERGENCY MEDICINE | Admitting: INTERNAL MEDICINE
Payer: COMMERCIAL

## 2018-11-09 DIAGNOSIS — E10.10 DIABETIC KETOACIDOSIS WITHOUT COMA ASSOCIATED WITH TYPE 1 DIABETES MELLITUS (HCC): Primary | ICD-10-CM

## 2018-11-09 PROBLEM — Z91.199 NONCOMPLIANCE: Status: ACTIVE | Noted: 2018-11-09

## 2018-11-09 LAB
ALBUMIN SERPL-MCNC: 4.2 G/DL (ref 3.5–5.2)
ALP BLD-CCNC: 92 U/L (ref 35–104)
ALT SERPL-CCNC: 21 U/L (ref 0–32)
AMPHETAMINE SCREEN, URINE: NOT DETECTED
ANION GAP SERPL CALCULATED.3IONS-SCNC: 13 MMOL/L (ref 7–16)
ANION GAP SERPL CALCULATED.3IONS-SCNC: 13 MMOL/L (ref 7–16)
ANION GAP SERPL CALCULATED.3IONS-SCNC: 15 MMOL/L (ref 7–16)
ANION GAP SERPL CALCULATED.3IONS-SCNC: 15 MMOL/L (ref 7–16)
ANION GAP SERPL CALCULATED.3IONS-SCNC: 19 MMOL/L (ref 7–16)
AST SERPL-CCNC: 34 U/L (ref 0–31)
BARBITURATE SCREEN URINE: NOT DETECTED
BASOPHILS ABSOLUTE: 0.05 E9/L (ref 0–0.2)
BASOPHILS RELATIVE PERCENT: 0.5 % (ref 0–2)
BENZODIAZEPINE SCREEN, URINE: NOT DETECTED
BETA-HYDROXYBUTYRATE: 2.24 MMOL/L (ref 0.02–0.27)
BILIRUB SERPL-MCNC: 0.3 MG/DL (ref 0–1.2)
BILIRUBIN URINE: NEGATIVE
BLOOD, URINE: NEGATIVE
BUN BLDV-MCNC: 10 MG/DL (ref 6–20)
BUN BLDV-MCNC: 10 MG/DL (ref 6–20)
BUN BLDV-MCNC: 13 MG/DL (ref 6–20)
BUN BLDV-MCNC: 17 MG/DL (ref 6–20)
BUN BLDV-MCNC: 8 MG/DL (ref 6–20)
CALCIUM SERPL-MCNC: 8.1 MG/DL (ref 8.6–10.2)
CALCIUM SERPL-MCNC: 8.1 MG/DL (ref 8.6–10.2)
CALCIUM SERPL-MCNC: 8.2 MG/DL (ref 8.6–10.2)
CALCIUM SERPL-MCNC: 9.2 MG/DL (ref 8.6–10.2)
CALCIUM SERPL-MCNC: 9.2 MG/DL (ref 8.6–10.2)
CANNABINOID SCREEN URINE: NOT DETECTED
CHLORIDE BLD-SCNC: 101 MMOL/L (ref 98–107)
CHLORIDE BLD-SCNC: 104 MMOL/L (ref 98–107)
CHLORIDE BLD-SCNC: 105 MMOL/L (ref 98–107)
CHLORIDE BLD-SCNC: 106 MMOL/L (ref 98–107)
CHLORIDE BLD-SCNC: 89 MMOL/L (ref 98–107)
CHP ED QC CHECK: 162
CHP ED QC CHECK: NORMAL
CHP ED QC CHECK: YES
CLARITY: CLEAR
CO2: 16 MMOL/L (ref 22–29)
CO2: 17 MMOL/L (ref 22–29)
CO2: 18 MMOL/L (ref 22–29)
CO2: 19 MMOL/L (ref 22–29)
CO2: 20 MMOL/L (ref 22–29)
COCAINE METABOLITE SCREEN URINE: NOT DETECTED
COLOR: YELLOW
CREAT SERPL-MCNC: 0.5 MG/DL (ref 0.5–1)
CREAT SERPL-MCNC: 0.6 MG/DL (ref 0.5–1)
CREAT SERPL-MCNC: 0.8 MG/DL (ref 0.5–1)
EOSINOPHILS ABSOLUTE: 0.05 E9/L (ref 0.05–0.5)
EOSINOPHILS RELATIVE PERCENT: 0.5 % (ref 0–6)
GFR AFRICAN AMERICAN: >60
GFR NON-AFRICAN AMERICAN: >60 ML/MIN/1.73
GLUCOSE BLD-MCNC: 113 MG/DL (ref 74–99)
GLUCOSE BLD-MCNC: 122 MG/DL
GLUCOSE BLD-MCNC: 140 MG/DL
GLUCOSE BLD-MCNC: 154 MG/DL (ref 74–99)
GLUCOSE BLD-MCNC: 162 MG/DL
GLUCOSE BLD-MCNC: 204 MG/DL (ref 74–99)
GLUCOSE BLD-MCNC: 238 MG/DL
GLUCOSE BLD-MCNC: 241 MG/DL (ref 74–99)
GLUCOSE BLD-MCNC: 247 MG/DL
GLUCOSE BLD-MCNC: 375 MG/DL
GLUCOSE BLD-MCNC: 678 MG/DL (ref 74–99)
GLUCOSE URINE: >=1000 MG/DL
HBA1C MFR BLD: 15.4 % (ref 4–5.6)
HCT VFR BLD CALC: 39 % (ref 34–48)
HEMOGLOBIN: 13.2 G/DL (ref 11.5–15.5)
IMMATURE GRANULOCYTES #: 0.03 E9/L
IMMATURE GRANULOCYTES %: 0.3 % (ref 0–5)
KETONES, URINE: 15 MG/DL
LEUKOCYTE ESTERASE, URINE: NEGATIVE
LYMPHOCYTES ABSOLUTE: 3.32 E9/L (ref 1.5–4)
LYMPHOCYTES RELATIVE PERCENT: 33.7 % (ref 20–42)
MAGNESIUM: 1.6 MG/DL (ref 1.6–2.6)
MAGNESIUM: 2.1 MG/DL (ref 1.6–2.6)
MAGNESIUM: 2.4 MG/DL (ref 1.6–2.6)
MCH RBC QN AUTO: 28.2 PG (ref 26–35)
MCHC RBC AUTO-ENTMCNC: 33.8 % (ref 32–34.5)
MCV RBC AUTO: 83.3 FL (ref 80–99.9)
METER GLUCOSE: 105 MG/DL (ref 74–99)
METER GLUCOSE: 122 MG/DL (ref 74–99)
METER GLUCOSE: 126 MG/DL (ref 74–99)
METER GLUCOSE: 134 MG/DL (ref 74–99)
METER GLUCOSE: 139 MG/DL (ref 74–99)
METER GLUCOSE: 140 MG/DL (ref 74–99)
METER GLUCOSE: 162 MG/DL (ref 74–99)
METER GLUCOSE: 184 MG/DL (ref 74–99)
METER GLUCOSE: 197 MG/DL (ref 74–99)
METER GLUCOSE: 211 MG/DL (ref 74–99)
METER GLUCOSE: 229 MG/DL (ref 74–99)
METER GLUCOSE: 238 MG/DL (ref 74–99)
METER GLUCOSE: 247 MG/DL (ref 74–99)
METER GLUCOSE: 375 MG/DL (ref 74–99)
METER GLUCOSE: >500 MG/DL (ref 74–99)
METHADONE SCREEN, URINE: NOT DETECTED
MONOCYTES ABSOLUTE: 0.59 E9/L (ref 0.1–0.95)
MONOCYTES RELATIVE PERCENT: 6 % (ref 2–12)
NEUTROPHILS ABSOLUTE: 5.8 E9/L (ref 1.8–7.3)
NEUTROPHILS RELATIVE PERCENT: 59 % (ref 43–80)
NITRITE, URINE: NEGATIVE
OPIATE SCREEN URINE: NOT DETECTED
OSMOLALITY: 317 MOSM/KG (ref 285–310)
PDW BLD-RTO: 12.6 FL (ref 11.5–15)
PH UA: 6 (ref 5–9)
PH VENOUS: 7.34 (ref 7.3–7.42)
PHENCYCLIDINE SCREEN URINE: NOT DETECTED
PHOSPHORUS: 2.6 MG/DL (ref 2.5–4.5)
PHOSPHORUS: 3.2 MG/DL (ref 2.5–4.5)
PHOSPHORUS: 3.3 MG/DL (ref 2.5–4.5)
PLATELET # BLD: 332 E9/L (ref 130–450)
PMV BLD AUTO: 11.3 FL (ref 7–12)
POTASSIUM SERPL-SCNC: 3.9 MMOL/L (ref 3.5–5)
POTASSIUM SERPL-SCNC: 4.6 MMOL/L (ref 3.5–5)
POTASSIUM SERPL-SCNC: 4.7 MMOL/L (ref 3.5–5)
POTASSIUM SERPL-SCNC: 5.2 MMOL/L (ref 3.5–5)
POTASSIUM SERPL-SCNC: 5.3 MMOL/L (ref 3.5–5)
PREGNANCY TEST URINE, POC: NEGATIVE
PROPOXYPHENE SCREEN: NOT DETECTED
PROTEIN UA: NEGATIVE MG/DL
RBC # BLD: 4.68 E12/L (ref 3.5–5.5)
SODIUM BLD-SCNC: 126 MMOL/L (ref 132–146)
SODIUM BLD-SCNC: 132 MMOL/L (ref 132–146)
SODIUM BLD-SCNC: 136 MMOL/L (ref 132–146)
SODIUM BLD-SCNC: 137 MMOL/L (ref 132–146)
SODIUM BLD-SCNC: 139 MMOL/L (ref 132–146)
SPECIFIC GRAVITY UA: <=1.005 (ref 1–1.03)
TOTAL PROTEIN: 7.7 G/DL (ref 6.4–8.3)
UROBILINOGEN, URINE: 0.2 E.U./DL
WBC # BLD: 9.8 E9/L (ref 4.5–11.5)

## 2018-11-09 PROCEDURE — 83930 ASSAY OF BLOOD OSMOLALITY: CPT

## 2018-11-09 PROCEDURE — 82800 BLOOD PH: CPT

## 2018-11-09 PROCEDURE — 6370000000 HC RX 637 (ALT 250 FOR IP): Performed by: PHYSICIAN ASSISTANT

## 2018-11-09 PROCEDURE — 96365 THER/PROPH/DIAG IV INF INIT: CPT

## 2018-11-09 PROCEDURE — 2500000003 HC RX 250 WO HCPCS: Performed by: PHYSICIAN ASSISTANT

## 2018-11-09 PROCEDURE — 85025 COMPLETE CBC W/AUTO DIFF WBC: CPT

## 2018-11-09 PROCEDURE — 1200000000 HC SEMI PRIVATE

## 2018-11-09 PROCEDURE — 6370000000 HC RX 637 (ALT 250 FOR IP): Performed by: INTERNAL MEDICINE

## 2018-11-09 PROCEDURE — 81003 URINALYSIS AUTO W/O SCOPE: CPT

## 2018-11-09 PROCEDURE — 99254 IP/OBS CNSLTJ NEW/EST MOD 60: CPT | Performed by: INTERNAL MEDICINE

## 2018-11-09 PROCEDURE — 80048 BASIC METABOLIC PNL TOTAL CA: CPT

## 2018-11-09 PROCEDURE — 82962 GLUCOSE BLOOD TEST: CPT

## 2018-11-09 PROCEDURE — 80053 COMPREHEN METABOLIC PANEL: CPT

## 2018-11-09 PROCEDURE — 2580000003 HC RX 258: Performed by: PHYSICIAN ASSISTANT

## 2018-11-09 PROCEDURE — 87088 URINE BACTERIA CULTURE: CPT

## 2018-11-09 PROCEDURE — 82010 KETONE BODYS QUAN: CPT

## 2018-11-09 PROCEDURE — 83036 HEMOGLOBIN GLYCOSYLATED A1C: CPT

## 2018-11-09 PROCEDURE — 2580000003 HC RX 258: Performed by: INTERNAL MEDICINE

## 2018-11-09 PROCEDURE — 80307 DRUG TEST PRSMV CHEM ANLYZR: CPT

## 2018-11-09 PROCEDURE — 6360000002 HC RX W HCPCS: Performed by: PHYSICIAN ASSISTANT

## 2018-11-09 PROCEDURE — 87186 SC STD MICRODIL/AGAR DIL: CPT

## 2018-11-09 PROCEDURE — 36415 COLL VENOUS BLD VENIPUNCTURE: CPT

## 2018-11-09 PROCEDURE — 99285 EMERGENCY DEPT VISIT HI MDM: CPT

## 2018-11-09 PROCEDURE — 96376 TX/PRO/DX INJ SAME DRUG ADON: CPT

## 2018-11-09 PROCEDURE — 83735 ASSAY OF MAGNESIUM: CPT

## 2018-11-09 PROCEDURE — 84100 ASSAY OF PHOSPHORUS: CPT

## 2018-11-09 RX ORDER — SODIUM CHLORIDE 9 MG/ML
INJECTION, SOLUTION INTRAVENOUS CONTINUOUS
Status: DISCONTINUED | OUTPATIENT
Start: 2018-11-09 | End: 2018-11-12 | Stop reason: HOSPADM

## 2018-11-09 RX ORDER — ACETAMINOPHEN 325 MG/1
650 TABLET ORAL EVERY 4 HOURS PRN
Status: DISCONTINUED | OUTPATIENT
Start: 2018-11-09 | End: 2018-11-12 | Stop reason: HOSPADM

## 2018-11-09 RX ORDER — DEXTROSE, SODIUM CHLORIDE, AND POTASSIUM CHLORIDE 5; .45; .15 G/100ML; G/100ML; G/100ML
INJECTION INTRAVENOUS CONTINUOUS PRN
Status: DISCONTINUED | OUTPATIENT
Start: 2018-11-09 | End: 2018-11-09

## 2018-11-09 RX ORDER — 0.9 % SODIUM CHLORIDE 0.9 %
15 INTRAVENOUS SOLUTION INTRAVENOUS ONCE
Status: COMPLETED | OUTPATIENT
Start: 2018-11-09 | End: 2018-11-09

## 2018-11-09 RX ORDER — INSULIN GLARGINE 100 [IU]/ML
28 INJECTION, SOLUTION SUBCUTANEOUS NIGHTLY
Status: DISCONTINUED | OUTPATIENT
Start: 2018-11-09 | End: 2018-11-12 | Stop reason: HOSPADM

## 2018-11-09 RX ORDER — SODIUM CHLORIDE 0.9 % (FLUSH) 0.9 %
10 SYRINGE (ML) INJECTION PRN
Status: DISCONTINUED | OUTPATIENT
Start: 2018-11-09 | End: 2018-11-12 | Stop reason: HOSPADM

## 2018-11-09 RX ORDER — POTASSIUM CHLORIDE 7.45 MG/ML
10 INJECTION INTRAVENOUS PRN
Status: DISCONTINUED | OUTPATIENT
Start: 2018-11-09 | End: 2018-11-12 | Stop reason: HOSPADM

## 2018-11-09 RX ORDER — DEXTROSE MONOHYDRATE 25 G/50ML
12.5 INJECTION, SOLUTION INTRAVENOUS PRN
Status: DISCONTINUED | OUTPATIENT
Start: 2018-11-09 | End: 2018-11-12 | Stop reason: HOSPADM

## 2018-11-09 RX ORDER — 0.9 % SODIUM CHLORIDE 0.9 %
1000 INTRAVENOUS SOLUTION INTRAVENOUS ONCE
Status: COMPLETED | OUTPATIENT
Start: 2018-11-09 | End: 2018-11-09

## 2018-11-09 RX ORDER — SODIUM CHLORIDE 0.9 % (FLUSH) 0.9 %
10 SYRINGE (ML) INJECTION EVERY 12 HOURS SCHEDULED
Status: DISCONTINUED | OUTPATIENT
Start: 2018-11-09 | End: 2018-11-12 | Stop reason: HOSPADM

## 2018-11-09 RX ORDER — DEXTROSE AND SODIUM CHLORIDE 5; .45 G/100ML; G/100ML
INJECTION, SOLUTION INTRAVENOUS CONTINUOUS
Status: DISCONTINUED | OUTPATIENT
Start: 2018-11-09 | End: 2018-11-09

## 2018-11-09 RX ORDER — MAGNESIUM SULFATE 1 G/100ML
1 INJECTION INTRAVENOUS PRN
Status: DISCONTINUED | OUTPATIENT
Start: 2018-11-09 | End: 2018-11-12 | Stop reason: HOSPADM

## 2018-11-09 RX ADMIN — SODIUM CHLORIDE 0.33 UNITS/HR: 9 INJECTION, SOLUTION INTRAVENOUS at 11:25

## 2018-11-09 RX ADMIN — SODIUM CHLORIDE 803 ML: 9 INJECTION, SOLUTION INTRAVENOUS at 02:18

## 2018-11-09 RX ADMIN — SODIUM CHLORIDE 0.1 UNITS/KG/HR: 9 INJECTION, SOLUTION INTRAVENOUS at 02:13

## 2018-11-09 RX ADMIN — INSULIN HUMAN 5 UNITS: 100 INJECTION, SOLUTION PARENTERAL at 02:11

## 2018-11-09 RX ADMIN — INSULIN LISPRO 5 UNITS: 100 INJECTION, SOLUTION INTRAVENOUS; SUBCUTANEOUS at 18:29

## 2018-11-09 RX ADMIN — Medication 10 ML: at 21:08

## 2018-11-09 RX ADMIN — SODIUM CHLORIDE: 9 INJECTION, SOLUTION INTRAVENOUS at 03:21

## 2018-11-09 RX ADMIN — INSULIN GLARGINE 28 UNITS: 100 INJECTION, SOLUTION SUBCUTANEOUS at 12:55

## 2018-11-09 RX ADMIN — SODIUM CHLORIDE 1000 ML: 9 INJECTION, SOLUTION INTRAVENOUS at 00:35

## 2018-11-09 RX ADMIN — INSULIN LISPRO 2 UNITS: 100 INJECTION, SOLUTION INTRAVENOUS; SUBCUTANEOUS at 17:31

## 2018-11-09 RX ADMIN — MAGNESIUM SULFATE HEPTAHYDRATE 1 G: 1 INJECTION, SOLUTION INTRAVENOUS at 07:29

## 2018-11-09 RX ADMIN — DEXTROSE, SODIUM CHLORIDE, AND POTASSIUM CHLORIDE: 5; .45; .15 INJECTION INTRAVENOUS at 04:21

## 2018-11-09 RX ADMIN — POTASSIUM CHLORIDE 10 MEQ: 7.46 INJECTION, SOLUTION INTRAVENOUS at 07:22

## 2018-11-09 RX ADMIN — DEXTROSE AND SODIUM CHLORIDE: 5; 450 INJECTION, SOLUTION INTRAVENOUS at 11:25

## 2018-11-09 RX ADMIN — INSULIN LISPRO 1 UNITS: 100 INJECTION, SOLUTION INTRAVENOUS; SUBCUTANEOUS at 21:06

## 2018-11-09 ASSESSMENT — PAIN SCALES - GENERAL
PAINLEVEL_OUTOF10: 0

## 2018-11-09 NOTE — ED PROVIDER NOTES
Airway patent. · Neck: Supple, full ROM, non tender to palpation in the midline, no stridor, no crepitus, no meningeal signs. · Respiratory: Lungs clear to auscultation bilaterally, no wheezes, rales, or rhonchi. Not in respiratory distress  · CV:  Tachy but Regular rate. Regular rhythm. No murmurs, gallops, or rubs. 2+ distal pulses. · GI:  Abdomen Soft, Non tender, Non distended. +BS. No rebound, guarding, or rigidity. No pulsatile masses. · Musculoskeletal: Moves all extremities x 4. Warm and well perfused, no clubbing, cyanosis, or edema. Capillary refill <3 seconds. · Integument: skin warm and dry. No rashes.    · Lymphatic: no lymphadenopathy noted  · Neurologic: GCS 15, no focal deficits, symmetric strength 5/5 in the upper and lower extremities bilaterally      Lab / Imaging Results   (All laboratory and radiology results have been personally reviewed by myself)  Labs:  Results for orders placed or performed during the hospital encounter of 11/09/18   CBC Auto Differential   Result Value Ref Range    WBC 9.8 4.5 - 11.5 E9/L    RBC 4.68 3.50 - 5.50 E12/L    Hemoglobin 13.2 11.5 - 15.5 g/dL    Hematocrit 39.0 34.0 - 48.0 %    MCV 83.3 80.0 - 99.9 fL    MCH 28.2 26.0 - 35.0 pg    MCHC 33.8 32.0 - 34.5 %    RDW 12.6 11.5 - 15.0 fL    Platelets 740 966 - 349 E9/L    MPV 11.3 7.0 - 12.0 fL    Neutrophils % 59.0 43.0 - 80.0 %    Immature Granulocytes % 0.3 0.0 - 5.0 %    Lymphocytes % 33.7 20.0 - 42.0 %    Monocytes % 6.0 2.0 - 12.0 %    Eosinophils % 0.5 0.0 - 6.0 %    Basophils % 0.5 0.0 - 2.0 %    Neutrophils # 5.80 1.80 - 7.30 E9/L    Immature Granulocytes # 0.03 E9/L    Lymphocytes # 3.32 1.50 - 4.00 E9/L    Monocytes # 0.59 0.10 - 0.95 E9/L    Eosinophils # 0.05 0.05 - 0.50 E9/L    Basophils # 0.05 0.00 - 0.20 E9/L   Comprehensive Metabolic Panel   Result Value Ref Range    Sodium 126 (L) 132 - 146 mmol/L    Potassium 4.6 3.5 - 5.0 mmol/L    Chloride 89 (L) 98 - 107 mmol/L    CO2 18 (L) 22 - 29 mmol/L

## 2018-11-09 NOTE — PLAN OF CARE
Problem: Discharge Planning:  Goal: Discharged to appropriate level of care  Discharged to appropriate level of care  Outcome: Ongoing      Problem: Serum Glucose Level - Abnormal:  Goal: Ability to maintain appropriate glucose levels will improve  Ability to maintain appropriate glucose levels will improve  Outcome: Ongoing      Problem: Sensory Perception - Impaired:  Goal: Ability to maintain a stable neurologic state will improve  Ability to maintain a stable neurologic state will improve  Outcome: Ongoing

## 2018-11-09 NOTE — H&P
[address] [facility]  History and Physical      CHIEF COMPLAINT: nausea      HISTORY OF PRESENT ILLNESS:      The patient is a 23 y.o. female patient of *Dr Randall Chiu** who presents with DKA. Pt states she has been sick and working hard. A couple nights she fell asleep without taking insulin. No vom/abd pain. Ja+ nausea. No diarrhea  + polyuria, polydipsia. 1st DKA in many years      Past Medical History:    Past Medical History:   Diagnosis Date    Bleeding at insertion site 1/5/2018    Common femoral artery injury, right, initial encounter 1/5/2018    Diabetes mellitus [facility])        Past Surgical History:    History reviewed. No pertinent surgical history. Medications Prior to Admission:    Prescriptions Prior to Admission: insulin aspart (NOVOLOG) 100 UNIT/ML injection pen, INJECT UP TO 50 UNITS PER DAY AS DIRECTED. insulin glargine (LANTUS) 100 UNIT/ML injection pen, Use as directed up to 40 units daily  insulin aspart (NOVOLOG) 100 UNIT/ML injection vial, Take 4 units plus **Corrective Low Dose Algorithm** Glucose: Dose:              No Insulin 140-199 1 Unit 200-249 2 Units 250-299 3 Units 300-349 4 Units 350-399 5 Units Over 399 6 Units  acetone, urine, test strip, Use daily as directed if bs >250 x2 or illness  glucagon 1 MG injection, Inject 1 mg into the muscle  FREESTYLE LITE strip, USE AS DIRECTED UP TO 6 TIMES DAILY  BD PEN NEEDLE SHELBIE U/F 32G X 4 MM MISC, USE AS DIRECTED UP TO 6 TIMES A DAY  insulin glargine (BASAGLAR KWIKPEN) 100 UNIT/ML injection pen, Inject 25 Units into the skin nightly    Allergies:    Patient has no known allergies. Social History:    reports that she has never smoked. She has never used smokeless tobacco. She reports that she does not drink alcohol or use drugs. Family History:   family history is not on file.     REVIEW OF SYSTEMS:  As above in the HPI, otherwise negative    PHYSICAL EXAM:    Vitals:  /66   Pulse 97   Temp 97.4 °F (36.3

## 2018-11-09 NOTE — LETTER
[address] [place] [id number]  Phone: [phone]             November 12, 2018    Patient: Katlin Griffin   YOB: 1995   Date of Visit: 11/9/2018       To Whom It May Concern:    Katlin Griffin was seen and treated at [facility] on 11/8/18-11/12/18.     Sincerely,       Lois Apple RN         Signature:__________________________________

## 2018-11-10 LAB
ANION GAP SERPL CALCULATED.3IONS-SCNC: 16 MMOL/L (ref 7–16)
ANION GAP SERPL CALCULATED.3IONS-SCNC: 20 MMOL/L (ref 7–16)
BUN BLDV-MCNC: 10 MG/DL (ref 6–20)
BUN BLDV-MCNC: 17 MG/DL (ref 6–20)
CALCIUM SERPL-MCNC: 9.3 MG/DL (ref 8.6–10.2)
CALCIUM SERPL-MCNC: 9.5 MG/DL (ref 8.6–10.2)
CHLORIDE BLD-SCNC: 102 MMOL/L (ref 98–107)
CHLORIDE BLD-SCNC: 94 MMOL/L (ref 98–107)
CO2: 17 MMOL/L (ref 22–29)
CO2: 23 MMOL/L (ref 22–29)
CREAT SERPL-MCNC: 0.5 MG/DL (ref 0.5–1)
CREAT SERPL-MCNC: 1 MG/DL (ref 0.5–1)
GFR AFRICAN AMERICAN: >60
GFR AFRICAN AMERICAN: >60
GFR NON-AFRICAN AMERICAN: >60 ML/MIN/1.73
GFR NON-AFRICAN AMERICAN: >60 ML/MIN/1.73
GLUCOSE BLD-MCNC: 260 MG/DL (ref 74–99)
GLUCOSE BLD-MCNC: 455 MG/DL (ref 74–99)
HCT VFR BLD CALC: 38.8 % (ref 34–48)
HEMOGLOBIN: 12.8 G/DL (ref 11.5–15.5)
MCH RBC QN AUTO: 28.6 PG (ref 26–35)
MCHC RBC AUTO-ENTMCNC: 33 % (ref 32–34.5)
MCV RBC AUTO: 86.8 FL (ref 80–99.9)
METER GLUCOSE: 201 MG/DL (ref 74–99)
METER GLUCOSE: 232 MG/DL (ref 74–99)
METER GLUCOSE: 316 MG/DL (ref 74–99)
METER GLUCOSE: 397 MG/DL (ref 74–99)
PDW BLD-RTO: 12.9 FL (ref 11.5–15)
PLATELET # BLD: 256 E9/L (ref 130–450)
PMV BLD AUTO: 11.4 FL (ref 7–12)
POTASSIUM SERPL-SCNC: 3.7 MMOL/L (ref 3.5–5)
POTASSIUM SERPL-SCNC: 4.1 MMOL/L (ref 3.5–5)
RBC # BLD: 4.47 E12/L (ref 3.5–5.5)
SODIUM BLD-SCNC: 133 MMOL/L (ref 132–146)
SODIUM BLD-SCNC: 139 MMOL/L (ref 132–146)
WBC # BLD: 7.9 E9/L (ref 4.5–11.5)

## 2018-11-10 PROCEDURE — 2580000003 HC RX 258: Performed by: INTERNAL MEDICINE

## 2018-11-10 PROCEDURE — 82962 GLUCOSE BLOOD TEST: CPT

## 2018-11-10 PROCEDURE — 85027 COMPLETE CBC AUTOMATED: CPT

## 2018-11-10 PROCEDURE — 36415 COLL VENOUS BLD VENIPUNCTURE: CPT

## 2018-11-10 PROCEDURE — 80048 BASIC METABOLIC PNL TOTAL CA: CPT

## 2018-11-10 PROCEDURE — 1200000000 HC SEMI PRIVATE

## 2018-11-10 PROCEDURE — 6370000000 HC RX 637 (ALT 250 FOR IP): Performed by: INTERNAL MEDICINE

## 2018-11-10 RX ORDER — DEXTROSE MONOHYDRATE 25 G/50ML
12.5 INJECTION, SOLUTION INTRAVENOUS PRN
Status: DISCONTINUED | OUTPATIENT
Start: 2018-11-10 | End: 2018-11-12 | Stop reason: HOSPADM

## 2018-11-10 RX ORDER — NICOTINE POLACRILEX 4 MG
15 LOZENGE BUCCAL PRN
Status: DISCONTINUED | OUTPATIENT
Start: 2018-11-10 | End: 2018-11-12 | Stop reason: HOSPADM

## 2018-11-10 RX ORDER — DEXTROSE MONOHYDRATE 50 MG/ML
100 INJECTION, SOLUTION INTRAVENOUS PRN
Status: DISCONTINUED | OUTPATIENT
Start: 2018-11-10 | End: 2018-11-12 | Stop reason: HOSPADM

## 2018-11-10 RX ADMIN — INSULIN LISPRO 2 UNITS: 100 INJECTION, SOLUTION INTRAVENOUS; SUBCUTANEOUS at 12:59

## 2018-11-10 RX ADMIN — INSULIN GLARGINE 28 UNITS: 100 INJECTION, SOLUTION SUBCUTANEOUS at 21:48

## 2018-11-10 RX ADMIN — INSULIN LISPRO 5 UNITS: 100 INJECTION, SOLUTION INTRAVENOUS; SUBCUTANEOUS at 09:00

## 2018-11-10 RX ADMIN — INSULIN LISPRO 4 UNITS: 100 INJECTION, SOLUTION INTRAVENOUS; SUBCUTANEOUS at 09:04

## 2018-11-10 RX ADMIN — INSULIN LISPRO 5 UNITS: 100 INJECTION, SOLUTION INTRAVENOUS; SUBCUTANEOUS at 12:56

## 2018-11-10 RX ADMIN — INSULIN LISPRO 2 UNITS: 100 INJECTION, SOLUTION INTRAVENOUS; SUBCUTANEOUS at 18:07

## 2018-11-10 RX ADMIN — Medication 10 ML: at 09:05

## 2018-11-10 RX ADMIN — Medication 10 ML: at 21:49

## 2018-11-10 RX ADMIN — INSULIN LISPRO 3 UNITS: 100 INJECTION, SOLUTION INTRAVENOUS; SUBCUTANEOUS at 21:48

## 2018-11-10 ASSESSMENT — PAIN SCALES - GENERAL
PAINLEVEL_OUTOF10: 0

## 2018-11-11 LAB
ANION GAP SERPL CALCULATED.3IONS-SCNC: 17 MMOL/L (ref 7–16)
BUN BLDV-MCNC: 17 MG/DL (ref 6–20)
CALCIUM SERPL-MCNC: 9.3 MG/DL (ref 8.6–10.2)
CHLORIDE BLD-SCNC: 100 MMOL/L (ref 98–107)
CO2: 23 MMOL/L (ref 22–29)
CREAT SERPL-MCNC: 0.7 MG/DL (ref 0.5–1)
GFR AFRICAN AMERICAN: >60
GFR NON-AFRICAN AMERICAN: >60 ML/MIN/1.73
GLUCOSE BLD-MCNC: 105 MG/DL (ref 74–99)
METER GLUCOSE: 190 MG/DL (ref 74–99)
METER GLUCOSE: 341 MG/DL (ref 74–99)
METER GLUCOSE: 369 MG/DL (ref 74–99)
METER GLUCOSE: 87 MG/DL (ref 74–99)
METER GLUCOSE: 89 MG/DL (ref 74–99)
ORGANISM: ABNORMAL
POTASSIUM SERPL-SCNC: 3.8 MMOL/L (ref 3.5–5)
SODIUM BLD-SCNC: 140 MMOL/L (ref 132–146)
URINE CULTURE, ROUTINE: ABNORMAL
URINE CULTURE, ROUTINE: ABNORMAL

## 2018-11-11 PROCEDURE — 82962 GLUCOSE BLOOD TEST: CPT

## 2018-11-11 PROCEDURE — 1200000000 HC SEMI PRIVATE

## 2018-11-11 PROCEDURE — 6370000000 HC RX 637 (ALT 250 FOR IP): Performed by: INTERNAL MEDICINE

## 2018-11-11 PROCEDURE — 36415 COLL VENOUS BLD VENIPUNCTURE: CPT

## 2018-11-11 PROCEDURE — 2580000003 HC RX 258: Performed by: INTERNAL MEDICINE

## 2018-11-11 PROCEDURE — 80048 BASIC METABOLIC PNL TOTAL CA: CPT

## 2018-11-11 RX ADMIN — Medication 10 ML: at 09:07

## 2018-11-11 RX ADMIN — INSULIN HUMAN 10 UNITS: 100 INJECTION, SOLUTION PARENTERAL at 01:35

## 2018-11-11 RX ADMIN — INSULIN LISPRO 5 UNITS: 100 INJECTION, SOLUTION INTRAVENOUS; SUBCUTANEOUS at 16:27

## 2018-11-11 RX ADMIN — INSULIN LISPRO 5 UNITS: 100 INJECTION, SOLUTION INTRAVENOUS; SUBCUTANEOUS at 09:06

## 2018-11-11 RX ADMIN — INSULIN GLARGINE 28 UNITS: 100 INJECTION, SOLUTION SUBCUTANEOUS at 21:46

## 2018-11-11 RX ADMIN — INSULIN LISPRO 2 UNITS: 100 INJECTION, SOLUTION INTRAVENOUS; SUBCUTANEOUS at 21:47

## 2018-11-11 RX ADMIN — INSULIN LISPRO 1 UNITS: 100 INJECTION, SOLUTION INTRAVENOUS; SUBCUTANEOUS at 11:45

## 2018-11-11 RX ADMIN — Medication 10 ML: at 21:48

## 2018-11-11 RX ADMIN — INSULIN LISPRO 5 UNITS: 100 INJECTION, SOLUTION INTRAVENOUS; SUBCUTANEOUS at 16:25

## 2018-11-11 ASSESSMENT — PAIN SCALES - GENERAL
PAINLEVEL_OUTOF10: 0

## 2018-11-11 NOTE — PROGRESS NOTES
Internal Medicine   Progress Note    Admit Date: 11/9/2018  Hospital day:    Hospital Day: 3  SUBJECTIVE:  No new acute problems overnight. Doing OK. No chest pain or shortness of breath. No nausea or vomiting. No fever. No abdominal pain. OBJECTIVE:     /71   Pulse 105   Temp 97.6 °F (36.4 °C) (Temporal)   Resp 16   Ht 5' 1\" (1.549 m)   Wt 130 lb 11.7 oz (59.3 kg)   LMP 10/24/2018 (Exact Date)   SpO2 99%   BMI 24.70 kg/m²   Patient Vitals for the past 24 hrs:   BP Temp Temp src Pulse Resp SpO2 Weight   11/11/18 0850 112/71 97.6 °F (36.4 °C) Temporal 105 16 99 % -   11/11/18 0535 - - - - - - 130 lb 11.7 oz (59.3 kg)   11/11/18 0130 126/68 97.4 °F (36.3 °C) Temporal 96 16 100 % -   11/10/18 2130 124/72 97.6 °F (36.4 °C) Temporal 98 16 100 % -   11/10/18 1545 (!) 129/94 97.5 °F (36.4 °C) Temporal 98 16 100 % -     GENERAL: Alert, breathing easily, not in apparent acute distress. LUNGS:  No obvious increased work of breathing, clear to auscultation bilaterally. CARDIOVASCULAR:  S1 and S2 regular to auscultate. ABDOMEN:  Soft, non-tender. Bowel sounds present  NEUROLOGIC:  Alert, and oriented. No gross focal deficit    Data      Recent Labs      11/09/18   0030  11/10/18   1039   WBC  9.8  7.9   HGB  13.2  12.8   PLT  332  256     Recent Labs      11/09/18   0455   11/09/18   0822  11/09/18   1130   11/10/18   1039  11/10/18   2207  11/11/18   0515   NA  136   --   137  139   < >  139  133  140   K  4.7   --   5.2*  3.9   < >  3.7  4.1  3.8   CL  104   --   105  106   < >  102  94*  100   CO2  17*   --   19*  20*   < >  17*  23  23   PHOS  2.6   --   3.3  3.2   --    --    --    --    BUN  13   --   10  8   < >  10  17  17   CREATININE  0.5   --   0.5  0.5   < >  0.5  1.0  0.7   GLUCOSE  241*   < >  154*  113*   < >  260*  455*  105*    < > = values in this interval not displayed.      Recent Labs      11/09/18   0030   AST  34*   ALT  21   BILITOT  0.3   ALKPHOS  92     Medications    

## 2018-11-11 NOTE — PROGRESS NOTES
Nutrition Education    Type and Reason for Visit: Initial, Consult    Patient stated that she is familiar with the carb control nutrition therapy. Handout provided to patient, contact information provided and encouraged patient to call if she had any additional questions. · Verbally reviewed following information with patient  · Written educational materials provided. · Contact name and number provided. · Refer to Patient Education activity for more details.     Electronically signed by Mary Otoole RD, TALI on 11/11/18 at 3:10 PM    Contact Number: x [phone]

## 2018-11-12 VITALS
OXYGEN SATURATION: 98 % | BODY MASS INDEX: 25.14 KG/M2 | WEIGHT: 133.16 LBS | TEMPERATURE: 98.5 F | HEIGHT: 61 IN | DIASTOLIC BLOOD PRESSURE: 80 MMHG | RESPIRATION RATE: 16 BRPM | HEART RATE: 108 BPM | SYSTOLIC BLOOD PRESSURE: 122 MMHG

## 2018-11-12 PROBLEM — E10.10 DKA, TYPE 1, NOT AT GOAL (HCC): Status: RESOLVED | Noted: 2018-11-12 | Resolved: 2018-11-12

## 2018-11-12 PROBLEM — E10.10 DKA, TYPE 1, NOT AT GOAL (HCC): Status: RESOLVED | Noted: 2018-01-02 | Resolved: 2018-11-12

## 2018-11-12 PROBLEM — E10.10 DKA, TYPE 1, NOT AT GOAL (HCC): Status: ACTIVE | Noted: 2018-11-12

## 2018-11-12 PROBLEM — Z91.199 NONCOMPLIANCE: Status: RESOLVED | Noted: 2018-11-09 | Resolved: 2018-11-12

## 2018-11-12 PROBLEM — Z91.199 PERSONAL HISTORY OF NONCOMPLIANCE WITH MEDICAL TREATMENT, PRESENTING HAZARDS TO HEALTH: Status: ACTIVE | Noted: 2018-11-12

## 2018-11-12 PROBLEM — O03.9 SPONTANEOUS ABORTION: Status: RESOLVED | Noted: 2018-05-07 | Resolved: 2018-11-12

## 2018-11-12 PROBLEM — E11.10 DKA (DIABETIC KETOACIDOSIS) (HCC): Status: ACTIVE | Noted: 2018-11-12

## 2018-11-12 LAB
ANION GAP SERPL CALCULATED.3IONS-SCNC: 15 MMOL/L (ref 7–16)
BUN BLDV-MCNC: 14 MG/DL (ref 6–20)
CALCIUM SERPL-MCNC: 9.1 MG/DL (ref 8.6–10.2)
CHLORIDE BLD-SCNC: 101 MMOL/L (ref 98–107)
CO2: 23 MMOL/L (ref 22–29)
CREAT SERPL-MCNC: 0.6 MG/DL (ref 0.5–1)
GFR AFRICAN AMERICAN: >60
GFR NON-AFRICAN AMERICAN: >60 ML/MIN/1.73
GLUCOSE BLD-MCNC: 244 MG/DL (ref 74–99)
METER GLUCOSE: 242 MG/DL (ref 74–99)
POTASSIUM SERPL-SCNC: 3.9 MMOL/L (ref 3.5–5)
SODIUM BLD-SCNC: 139 MMOL/L (ref 132–146)

## 2018-11-12 PROCEDURE — 82962 GLUCOSE BLOOD TEST: CPT

## 2018-11-12 PROCEDURE — 6360000002 HC RX W HCPCS: Performed by: INTERNAL MEDICINE

## 2018-11-12 PROCEDURE — 6370000000 HC RX 637 (ALT 250 FOR IP): Performed by: INTERNAL MEDICINE

## 2018-11-12 PROCEDURE — 80048 BASIC METABOLIC PNL TOTAL CA: CPT

## 2018-11-12 PROCEDURE — 36415 COLL VENOUS BLD VENIPUNCTURE: CPT

## 2018-11-12 PROCEDURE — 2580000003 HC RX 258: Performed by: INTERNAL MEDICINE

## 2018-11-12 RX ADMIN — ENOXAPARIN SODIUM 40 MG: 40 INJECTION SUBCUTANEOUS at 10:02

## 2018-11-12 RX ADMIN — INSULIN LISPRO 5 UNITS: 100 INJECTION, SOLUTION INTRAVENOUS; SUBCUTANEOUS at 10:02

## 2018-11-12 RX ADMIN — Medication 10 ML: at 10:01

## 2018-11-12 RX ADMIN — INSULIN LISPRO 2 UNITS: 100 INJECTION, SOLUTION INTRAVENOUS; SUBCUTANEOUS at 10:04

## 2018-11-12 ASSESSMENT — PAIN SCALES - GENERAL
PAINLEVEL_OUTOF10: 0
PAINLEVEL_OUTOF10: 0

## 2018-11-12 NOTE — DISCHARGE SUMMARY
Physician Discharge Summary     Patient ID:  Hyun Erps  [id number]  23 y.o.  1995    Admit date: 11/9/2018    Discharge date and time:  11/12/2018    Admission Diagnoses:   Patient Active Problem List   Diagnosis    [facility] [address]    Diabetes mellitus type 1, uncontrolled ([facility] [address] [facility]    Personal history of noncompliance with medical treatment, presenting hazards to health       Discharge Diagnoses: as above    Consults: pulmonary/intensive care    Procedures: see chart    Hospital Course: patient was admitted with nausea and vomiting. Patient is a type 1 diabetic and is non-compliant with her insulin. She was found to be in DKA. She was placed in the ICU. She was given IV fluids and placed on an insulin drip. Her AG normalized. She was transitioned to SQ insulin. She tolerated this well. Discharge Exam:  See progress note from today    Condition:  stable    Disposition: home    Patient Instructions:   Current Discharge Medication List      CONTINUE these medications which have NOT CHANGED    Details   !! insulin glargine (LANTUS) 100 UNIT/ML injection pen Use as directed up to 40 units daily      insulin aspart (NOVOLOG) 100 UNIT/ML injection vial Take 4 units plus  **Corrective Low Dose Algorithm**  Glucose: Dose:               No Insulin  140-199 1 Unit  200-249 2 Units  250-299 3 Units  300-349 4 Units  350-399 5 Units  Over 399 6 Units  Qty: 1 vial, Refills: 3      acetone, urine, test strip Use daily as directed if bs >250 x2 or illness      glucagon 1 MG injection Inject 1 mg into the muscle      FREESTYLE LITE strip USE AS DIRECTED UP TO 6 TIMES DAILY  Refills: 2      BD PEN NEEDLE SHELBIE U/F 32G X 4 MM MISC USE AS DIRECTED UP TO 6 TIMES A DAY  Refills: 2      !! insulin glargine (BASAGLAR KWIKPEN) 100 UNIT/ML injection pen Inject 25 Units into the skin nightly  Qty: 5 pen, Refills: 3       !! - Potential duplicate medications found. Please discuss with provider. STOP taking these medications       ibuprofen (ADVIL;MOTRIN) 800 MG tablet Comments:   Reason for Stopping:             Activity: activity as tolerated  Diet: diabetic diet    Follow up with dr Fabricio Anglin in 1 week.       Note that over 30 minutes was spent in preparing discharge papers, discussing discharge with patient, medication review, etc.    Signed:  Damien Mauricio MD  11/12/2018  9:51 AM

## 2018-11-24 ENCOUNTER — HOSPITAL ENCOUNTER (EMERGENCY)
Age: 23
Discharge: HOME | End: 2018-11-24
Payer: COMMERCIAL

## 2018-11-24 ENCOUNTER — APPOINTMENT (OUTPATIENT)
Dept: GENERAL RADIOLOGY | Age: 23
End: 2018-11-24
Payer: COMMERCIAL

## 2018-11-24 VITALS
OXYGEN SATURATION: 97 % | HEART RATE: 88 BPM | TEMPERATURE: 98.3 F | BODY MASS INDEX: 22.66 KG/M2 | RESPIRATION RATE: 16 BRPM | WEIGHT: 120 LBS | HEIGHT: 61 IN | DIASTOLIC BLOOD PRESSURE: 79 MMHG | SYSTOLIC BLOOD PRESSURE: 129 MMHG

## 2018-11-24 DIAGNOSIS — M25.561 ACUTE PAIN OF RIGHT KNEE: Primary | ICD-10-CM

## 2018-11-24 PROCEDURE — 73562 X-RAY EXAM OF KNEE 3: CPT

## 2018-11-24 PROCEDURE — 99283 EMERGENCY DEPT VISIT LOW MDM: CPT

## 2018-11-24 PROCEDURE — 6370000000 HC RX 637 (ALT 250 FOR IP): Performed by: NURSE PRACTITIONER

## 2018-11-24 RX ORDER — IBUPROFEN 800 MG/1
800 TABLET ORAL EVERY 6 HOURS PRN
Qty: 20 TABLET | Refills: 3 | Status: SHIPPED | OUTPATIENT
Start: 2018-11-24 | End: 2018-12-19

## 2018-11-24 RX ORDER — PREDNISONE 20 MG/1
40 TABLET ORAL ONCE
Status: DISCONTINUED | OUTPATIENT
Start: 2018-11-24 | End: 2018-11-24

## 2018-11-24 RX ORDER — TRAMADOL HYDROCHLORIDE 50 MG/1
50 TABLET ORAL EVERY 6 HOURS PRN
Qty: 8 TABLET | Refills: 0 | Status: SHIPPED | OUTPATIENT
Start: 2018-11-24 | End: 2018-11-27

## 2018-11-24 RX ORDER — IBUPROFEN 800 MG/1
800 TABLET ORAL ONCE
Status: COMPLETED | OUTPATIENT
Start: 2018-11-24 | End: 2018-11-24

## 2018-11-24 RX ADMIN — IBUPROFEN 800 MG: 800 TABLET ORAL at 23:25

## 2018-11-24 ASSESSMENT — PAIN SCALES - GENERAL: PAINLEVEL_OUTOF10: 8

## 2018-11-25 NOTE — ED PROVIDER NOTES
time and they are agreeable with the plan. Assessment      1. Acute pain of right knee      Plan   Discharge to home  Patient condition is stable    New Medications     New Prescriptions    IBUPROFEN (ADVIL;MOTRIN) 800 MG TABLET    Take 1 tablet by mouth every 6 hours as needed for Pain    TRAMADOL (ULTRAM) 50 MG TABLET    Take 1 tablet by mouth every 6 hours as needed for Pain for up to 3 days. Intended supply: 3 days. Take lowest dose possible to manage pain. Electronically signed by MIKE Cochran CNP   DD: 11/24/18  **This report was transcribed using voice recognition software. Every effort was made to ensure accuracy; however, inadvertent computerized transcription errors may be present.   END OF ED PROVIDER NOTE      MIKE Cochran CNP  11/24/18 [id number]

## 2018-12-19 ENCOUNTER — APPOINTMENT (OUTPATIENT)
Dept: GENERAL RADIOLOGY | Age: 23
DRG: 420 | End: 2018-12-19
Payer: COMMERCIAL

## 2018-12-19 ENCOUNTER — HOSPITAL ENCOUNTER (INPATIENT)
Age: 23
LOS: 3 days | Discharge: HOME | DRG: 420 | End: 2018-12-22
Attending: EMERGENCY MEDICINE | Admitting: INTERNAL MEDICINE
Payer: COMMERCIAL

## 2018-12-19 ENCOUNTER — APPOINTMENT (OUTPATIENT)
Dept: CT IMAGING | Age: 23
DRG: 420 | End: 2018-12-19
Payer: COMMERCIAL

## 2018-12-19 DIAGNOSIS — E10.65 UNCONTROLLED TYPE 1 DIABETES MELLITUS WITH HYPERGLYCEMIA (HCC): Chronic | ICD-10-CM

## 2018-12-19 DIAGNOSIS — E10.10 DIABETIC KETOACIDOSIS WITHOUT COMA ASSOCIATED WITH TYPE 1 DIABETES MELLITUS (HCC): Primary | ICD-10-CM

## 2018-12-19 PROBLEM — E11.10 DKA, TYPE 2, NOT AT GOAL (HCC): Status: ACTIVE | Noted: 2018-12-19

## 2018-12-19 PROBLEM — E11.10 DKA, TYPE 2, NOT AT GOAL (HCC): Status: RESOLVED | Noted: 2018-12-19 | Resolved: 2018-12-19

## 2018-12-19 PROBLEM — E11.10 DKA (DIABETIC KETOACIDOSIS) (HCC): Status: RESOLVED | Noted: 2018-11-12 | Resolved: 2018-12-19

## 2018-12-19 PROBLEM — M25.561 ACUTE PAIN OF RIGHT KNEE: Status: RESOLVED | Noted: 2018-11-24 | Resolved: 2018-12-19

## 2018-12-19 PROBLEM — E11.10 DKA (DIABETIC KETOACIDOSIS) (HCC): Status: ACTIVE | Noted: 2018-12-19

## 2018-12-19 LAB
ALBUMIN SERPL-MCNC: 4.8 G/DL (ref 3.5–5.2)
ALP BLD-CCNC: 159 U/L (ref 35–104)
ALT SERPL-CCNC: 24 U/L (ref 0–32)
AMPHETAMINE SCREEN, URINE: NOT DETECTED
ANION GAP SERPL CALCULATED.3IONS-SCNC: 15 MMOL/L (ref 7–16)
ANION GAP SERPL CALCULATED.3IONS-SCNC: 16 MMOL/L (ref 7–16)
ANION GAP SERPL CALCULATED.3IONS-SCNC: 17 MMOL/L (ref 7–16)
ANION GAP SERPL CALCULATED.3IONS-SCNC: 25 MMOL/L (ref 7–16)
ANION GAP SERPL CALCULATED.3IONS-SCNC: 36 MMOL/L (ref 7–16)
AST SERPL-CCNC: 28 U/L (ref 0–31)
BACTERIA: NORMAL /HPF
BARBITURATE SCREEN URINE: NOT DETECTED
BASOPHILS ABSOLUTE: 0.11 E9/L (ref 0–0.2)
BASOPHILS RELATIVE PERCENT: 0.4 % (ref 0–2)
BENZODIAZEPINE SCREEN, URINE: NOT DETECTED
BETA-HYDROXYBUTYRATE: >4.5 MMOL/L (ref 0.02–0.27)
BILIRUB SERPL-MCNC: 0.2 MG/DL (ref 0–1.2)
BILIRUBIN DIRECT: <0.2 MG/DL (ref 0–0.3)
BILIRUBIN URINE: NEGATIVE
BILIRUBIN, INDIRECT: ABNORMAL MG/DL (ref 0–1)
BLOOD, URINE: ABNORMAL
BUN BLDV-MCNC: 12 MG/DL (ref 6–20)
BUN BLDV-MCNC: 17 MG/DL (ref 6–20)
BUN BLDV-MCNC: 5 MG/DL (ref 6–20)
BUN BLDV-MCNC: 7 MG/DL (ref 6–20)
BUN BLDV-MCNC: 8 MG/DL (ref 6–20)
CALCIUM SERPL-MCNC: 10.1 MG/DL (ref 8.6–10.2)
CALCIUM SERPL-MCNC: 7.6 MG/DL (ref 8.6–10.2)
CALCIUM SERPL-MCNC: 7.8 MG/DL (ref 8.6–10.2)
CALCIUM SERPL-MCNC: 7.8 MG/DL (ref 8.6–10.2)
CALCIUM SERPL-MCNC: 8 MG/DL (ref 8.6–10.2)
CANNABINOID SCREEN URINE: NOT DETECTED
CHLORIDE BLD-SCNC: 107 MMOL/L (ref 98–107)
CHLORIDE BLD-SCNC: 108 MMOL/L (ref 98–107)
CHLORIDE BLD-SCNC: 111 MMOL/L (ref 98–107)
CHLORIDE BLD-SCNC: 113 MMOL/L (ref 98–107)
CHLORIDE BLD-SCNC: 92 MMOL/L (ref 98–107)
CHP ED QC CHECK: YES
CLARITY: CLEAR
CO2: 11 MMOL/L (ref 22–29)
CO2: 11 MMOL/L (ref 22–29)
CO2: 13 MMOL/L (ref 22–29)
CO2: 5 MMOL/L (ref 22–29)
CO2: 5 MMOL/L (ref 22–29)
COCAINE METABOLITE SCREEN URINE: NOT DETECTED
COHB: 1.1 % (ref 0–1.5)
COLOR: YELLOW
COMMENT: ABNORMAL
CREAT SERPL-MCNC: 0.5 MG/DL (ref 0.5–1)
CREAT SERPL-MCNC: 0.6 MG/DL (ref 0.5–1)
CREAT SERPL-MCNC: 0.8 MG/DL (ref 0.5–1)
CRITICAL: ABNORMAL
DATE ANALYZED: ABNORMAL
DATE OF COLLECTION: ABNORMAL
EKG ATRIAL RATE: 140 BPM
EKG P AXIS: 79 DEGREES
EKG P-R INTERVAL: 120 MS
EKG Q-T INTERVAL: 356 MS
EKG QRS DURATION: 60 MS
EKG QTC CALCULATION (BAZETT): 543 MS
EKG R AXIS: 63 DEGREES
EKG T AXIS: 83 DEGREES
EKG VENTRICULAR RATE: 140 BPM
EOSINOPHILS ABSOLUTE: 0.03 E9/L (ref 0.05–0.5)
EOSINOPHILS RELATIVE PERCENT: 0.1 % (ref 0–6)
EPITHELIAL CELLS, UA: NORMAL /HPF
GFR AFRICAN AMERICAN: >60
GFR NON-AFRICAN AMERICAN: >60 ML/MIN/1.73
GLUCOSE BLD-MCNC: 140 MG/DL (ref 74–99)
GLUCOSE BLD-MCNC: 158 MG/DL (ref 74–99)
GLUCOSE BLD-MCNC: 164 MG/DL (ref 74–99)
GLUCOSE BLD-MCNC: 250 MG/DL (ref 74–99)
GLUCOSE BLD-MCNC: 307 MG/DL
GLUCOSE BLD-MCNC: 424 MG/DL
GLUCOSE BLD-MCNC: 489 MG/DL
GLUCOSE BLD-MCNC: 494 MG/DL
GLUCOSE BLD-MCNC: 594 MG/DL (ref 74–99)
GLUCOSE URINE: 500 MG/DL
HCG QUALITATIVE: NEGATIVE
HCT VFR BLD CALC: 50.5 % (ref 34–48)
HEMOGLOBIN: 16.4 G/DL (ref 11.5–15.5)
HHB: 2 % (ref 0–5)
IMMATURE GRANULOCYTES #: 0.21 E9/L
IMMATURE GRANULOCYTES %: 0.8 % (ref 0–5)
KETONES, URINE: >=80 MG/DL
LAB: ABNORMAL
LACTIC ACID: 2.1 MMOL/L (ref 0.5–2.2)
LACTIC ACID: 3 MMOL/L (ref 0.5–2.2)
LACTIC ACID: 3.1 MMOL/L (ref 0.5–2.2)
LEUKOCYTE ESTERASE, URINE: ABNORMAL
LIPASE: 12 U/L (ref 13–60)
LYMPHOCYTES ABSOLUTE: 5.75 E9/L (ref 1.5–4)
LYMPHOCYTES RELATIVE PERCENT: 22.1 % (ref 20–42)
Lab: ABNORMAL
MAGNESIUM: 1.6 MG/DL (ref 1.6–2.6)
MAGNESIUM: 1.7 MG/DL (ref 1.6–2.6)
MAGNESIUM: 2.5 MG/DL (ref 1.6–2.6)
MCH RBC QN AUTO: 29.1 PG (ref 26–35)
MCHC RBC AUTO-ENTMCNC: 32.5 % (ref 32–34.5)
MCV RBC AUTO: 89.7 FL (ref 80–99.9)
METER GLUCOSE: 125 MG/DL (ref 74–99)
METER GLUCOSE: 131 MG/DL (ref 74–99)
METER GLUCOSE: 133 MG/DL (ref 74–99)
METER GLUCOSE: 133 MG/DL (ref 74–99)
METER GLUCOSE: 134 MG/DL (ref 74–99)
METER GLUCOSE: 136 MG/DL (ref 74–99)
METER GLUCOSE: 140 MG/DL (ref 74–99)
METER GLUCOSE: 143 MG/DL (ref 74–99)
METER GLUCOSE: 146 MG/DL (ref 74–99)
METER GLUCOSE: 157 MG/DL (ref 74–99)
METER GLUCOSE: 159 MG/DL (ref 74–99)
METER GLUCOSE: 160 MG/DL (ref 74–99)
METER GLUCOSE: 187 MG/DL (ref 74–99)
METER GLUCOSE: 288 MG/DL (ref 74–99)
METER GLUCOSE: 307 MG/DL (ref 74–99)
METER GLUCOSE: 424 MG/DL (ref 74–99)
METER GLUCOSE: 489 MG/DL (ref 74–99)
METER GLUCOSE: 494 MG/DL (ref 74–99)
METHADONE SCREEN, URINE: NOT DETECTED
METHB: 0.5 % (ref 0–1.5)
MODE: ABNORMAL
MONOCYTES ABSOLUTE: 1.07 E9/L (ref 0.1–0.95)
MONOCYTES RELATIVE PERCENT: 4.1 % (ref 2–12)
NEUTROPHILS ABSOLUTE: 18.85 E9/L (ref 1.8–7.3)
NEUTROPHILS RELATIVE PERCENT: 72.5 % (ref 43–80)
NITRITE, URINE: NEGATIVE
O2 CONTENT: 22.3 ML/DL
O2 SATURATION: 98 % (ref 92–98.5)
O2HB: 96.4 % (ref 94–97)
OPERATOR ID: 2485
OPIATE SCREEN URINE: POSITIVE
PATIENT TEMP: 37 C
PCO2: <15 MMHG (ref 35–45)
PDW BLD-RTO: 12.7 FL (ref 11.5–15)
PH BLOOD GAS: 7.12 (ref 7.35–7.45)
PH UA: 5.5 (ref 5–9)
PH VENOUS: 6.99 (ref 7.3–7.42)
PHENCYCLIDINE SCREEN URINE: NOT DETECTED
PHOSPHORUS: 1.7 MG/DL (ref 2.5–4.5)
PHOSPHORUS: 2.4 MG/DL (ref 2.5–4.5)
PHOSPHORUS: 2.6 MG/DL (ref 2.5–4.5)
PHOSPHORUS: 3.1 MG/DL (ref 2.5–4.5)
PLATELET # BLD: 472 E9/L (ref 130–450)
PMV BLD AUTO: 11 FL (ref 7–12)
PO2: 130.3 MMHG (ref 60–100)
POTASSIUM REFLEX MAGNESIUM: 5.3 MMOL/L (ref 3.5–5)
POTASSIUM SERPL-SCNC: 4.1 MMOL/L (ref 3.5–5)
POTASSIUM SERPL-SCNC: 4.4 MMOL/L (ref 3.5–5)
POTASSIUM SERPL-SCNC: 4.5 MMOL/L (ref 3.5–5)
POTASSIUM SERPL-SCNC: 4.7 MMOL/L (ref 3.5–5)
PROCALCITONIN: 0.14 NG/ML (ref 0–0.08)
PROPOXYPHENE SCREEN: NOT DETECTED
PROTEIN UA: 30 MG/DL
RBC # BLD: 5.63 E12/L (ref 3.5–5.5)
RBC UA: NORMAL /HPF (ref 0–2)
SODIUM BLD-SCNC: 133 MMOL/L (ref 132–146)
SODIUM BLD-SCNC: 135 MMOL/L (ref 132–146)
SODIUM BLD-SCNC: 135 MMOL/L (ref 132–146)
SODIUM BLD-SCNC: 139 MMOL/L (ref 132–146)
SODIUM BLD-SCNC: 143 MMOL/L (ref 132–146)
SOURCE, BLOOD GAS: ABNORMAL
SPECIFIC GRAVITY UA: 1.02 (ref 1–1.03)
THB: 16.3 G/DL (ref 11.5–16.5)
TIME ANALYZED: 410
TOTAL PROTEIN: 9.6 G/DL (ref 6.4–8.3)
UROBILINOGEN, URINE: 0.2 E.U./DL
WBC # BLD: 26 E9/L (ref 4.5–11.5)
WBC UA: NORMAL /HPF (ref 0–5)

## 2018-12-19 PROCEDURE — 87040 BLOOD CULTURE FOR BACTERIA: CPT

## 2018-12-19 PROCEDURE — 6360000002 HC RX W HCPCS: Performed by: EMERGENCY MEDICINE

## 2018-12-19 PROCEDURE — 2580000003 HC RX 258: Performed by: EMERGENCY MEDICINE

## 2018-12-19 PROCEDURE — 84703 CHORIONIC GONADOTROPIN ASSAY: CPT

## 2018-12-19 PROCEDURE — 87486 CHLMYD PNEUM DNA AMP PROBE: CPT

## 2018-12-19 PROCEDURE — 87581 M.PNEUMON DNA AMP PROBE: CPT

## 2018-12-19 PROCEDURE — 2580000003 HC RX 258: Performed by: INTERNAL MEDICINE

## 2018-12-19 PROCEDURE — 80076 HEPATIC FUNCTION PANEL: CPT

## 2018-12-19 PROCEDURE — 83735 ASSAY OF MAGNESIUM: CPT

## 2018-12-19 PROCEDURE — 2580000003 HC RX 258: Performed by: STUDENT IN AN ORGANIZED HEALTH CARE EDUCATION/TRAINING PROGRAM

## 2018-12-19 PROCEDURE — 80307 DRUG TEST PRSMV CHEM ANLYZR: CPT

## 2018-12-19 PROCEDURE — 87081 CULTURE SCREEN ONLY: CPT

## 2018-12-19 PROCEDURE — 82800 BLOOD PH: CPT

## 2018-12-19 PROCEDURE — 80048 BASIC METABOLIC PNL TOTAL CA: CPT

## 2018-12-19 PROCEDURE — 74176 CT ABD & PELVIS W/O CONTRAST: CPT

## 2018-12-19 PROCEDURE — 2000000000 HC ICU R&B

## 2018-12-19 PROCEDURE — 87633 RESP VIRUS 12-25 TARGETS: CPT

## 2018-12-19 PROCEDURE — 81001 URINALYSIS AUTO W/SCOPE: CPT

## 2018-12-19 PROCEDURE — 6360000002 HC RX W HCPCS: Performed by: STUDENT IN AN ORGANIZED HEALTH CARE EDUCATION/TRAINING PROGRAM

## 2018-12-19 PROCEDURE — 87088 URINE BACTERIA CULTURE: CPT

## 2018-12-19 PROCEDURE — 84100 ASSAY OF PHOSPHORUS: CPT

## 2018-12-19 PROCEDURE — 93005 ELECTROCARDIOGRAM TRACING: CPT | Performed by: EMERGENCY MEDICINE

## 2018-12-19 PROCEDURE — 96365 THER/PROPH/DIAG IV INF INIT: CPT

## 2018-12-19 PROCEDURE — 85025 COMPLETE CBC W/AUTO DIFF WBC: CPT

## 2018-12-19 PROCEDURE — 82010 KETONE BODYS QUAN: CPT

## 2018-12-19 PROCEDURE — 82805 BLOOD GASES W/O2 SATURATION: CPT

## 2018-12-19 PROCEDURE — 84145 PROCALCITONIN (PCT): CPT

## 2018-12-19 PROCEDURE — 83605 ASSAY OF LACTIC ACID: CPT

## 2018-12-19 PROCEDURE — 96375 TX/PRO/DX INJ NEW DRUG ADDON: CPT

## 2018-12-19 PROCEDURE — 2580000003 HC RX 258

## 2018-12-19 PROCEDURE — 82962 GLUCOSE BLOOD TEST: CPT

## 2018-12-19 PROCEDURE — 96376 TX/PRO/DX INJ SAME DRUG ADON: CPT

## 2018-12-19 PROCEDURE — 2500000003 HC RX 250 WO HCPCS: Performed by: INTERNAL MEDICINE

## 2018-12-19 PROCEDURE — 71045 X-RAY EXAM CHEST 1 VIEW: CPT

## 2018-12-19 PROCEDURE — 36415 COLL VENOUS BLD VENIPUNCTURE: CPT

## 2018-12-19 PROCEDURE — 83690 ASSAY OF LIPASE: CPT

## 2018-12-19 PROCEDURE — G0480 DRUG TEST DEF 1-7 CLASSES: HCPCS

## 2018-12-19 PROCEDURE — 6370000000 HC RX 637 (ALT 250 FOR IP): Performed by: EMERGENCY MEDICINE

## 2018-12-19 PROCEDURE — 99285 EMERGENCY DEPT VISIT HI MDM: CPT

## 2018-12-19 PROCEDURE — 51702 INSERT TEMP BLADDER CATH: CPT

## 2018-12-19 PROCEDURE — 87798 DETECT AGENT NOS DNA AMP: CPT

## 2018-12-19 RX ORDER — SODIUM CHLORIDE 9 MG/ML
INJECTION, SOLUTION INTRAVENOUS ONCE
Status: COMPLETED | OUTPATIENT
Start: 2018-12-19 | End: 2018-12-19

## 2018-12-19 RX ORDER — MORPHINE SULFATE 2 MG/ML
2 INJECTION, SOLUTION INTRAMUSCULAR; INTRAVENOUS EVERY 4 HOURS PRN
Status: DISCONTINUED | OUTPATIENT
Start: 2018-12-19 | End: 2018-12-22 | Stop reason: HOSPADM

## 2018-12-19 RX ORDER — POTASSIUM CHLORIDE 7.45 MG/ML
10 INJECTION INTRAVENOUS PRN
Status: DISCONTINUED | OUTPATIENT
Start: 2018-12-19 | End: 2018-12-20

## 2018-12-19 RX ORDER — 0.9 % SODIUM CHLORIDE 0.9 %
1000 INTRAVENOUS SOLUTION INTRAVENOUS ONCE
Status: COMPLETED | OUTPATIENT
Start: 2018-12-19 | End: 2018-12-19

## 2018-12-19 RX ORDER — PROMETHAZINE HYDROCHLORIDE 25 MG/ML
12.5 INJECTION, SOLUTION INTRAMUSCULAR; INTRAVENOUS ONCE
Status: COMPLETED | OUTPATIENT
Start: 2018-12-19 | End: 2018-12-19

## 2018-12-19 RX ORDER — 0.9 % SODIUM CHLORIDE 0.9 %
2000 INTRAVENOUS SOLUTION INTRAVENOUS ONCE
Status: COMPLETED | OUTPATIENT
Start: 2018-12-19 | End: 2018-12-19

## 2018-12-19 RX ORDER — MAGNESIUM SULFATE 1 G/100ML
1 INJECTION INTRAVENOUS PRN
Status: DISCONTINUED | OUTPATIENT
Start: 2018-12-19 | End: 2018-12-20

## 2018-12-19 RX ORDER — DEXTROSE MONOHYDRATE 25 G/50ML
12.5 INJECTION, SOLUTION INTRAVENOUS PRN
Status: DISCONTINUED | OUTPATIENT
Start: 2018-12-19 | End: 2018-12-20 | Stop reason: SDUPTHER

## 2018-12-19 RX ORDER — SODIUM CHLORIDE 9 MG/ML
INJECTION, SOLUTION INTRAVENOUS CONTINUOUS
Status: DISCONTINUED | OUTPATIENT
Start: 2018-12-19 | End: 2018-12-20

## 2018-12-19 RX ORDER — FENTANYL CITRATE 50 UG/ML
25 INJECTION, SOLUTION INTRAMUSCULAR; INTRAVENOUS ONCE
Status: DISCONTINUED | OUTPATIENT
Start: 2018-12-19 | End: 2018-12-19

## 2018-12-19 RX ORDER — SODIUM CHLORIDE 0.9 % (FLUSH) 0.9 %
SYRINGE (ML) INJECTION
Status: COMPLETED
Start: 2018-12-19 | End: 2018-12-19

## 2018-12-19 RX ORDER — MORPHINE SULFATE 2 MG/ML
2 INJECTION, SOLUTION INTRAMUSCULAR; INTRAVENOUS ONCE
Status: COMPLETED | OUTPATIENT
Start: 2018-12-19 | End: 2018-12-19

## 2018-12-19 RX ORDER — DEXTROSE AND SODIUM CHLORIDE 5; .45 G/100ML; G/100ML
INJECTION, SOLUTION INTRAVENOUS CONTINUOUS PRN
Status: DISCONTINUED | OUTPATIENT
Start: 2018-12-19 | End: 2018-12-20

## 2018-12-19 RX ADMIN — DEXTROSE AND SODIUM CHLORIDE: 5; 450 INJECTION, SOLUTION INTRAVENOUS at 09:10

## 2018-12-19 RX ADMIN — INSULIN HUMAN 5 UNITS: 100 INJECTION, SOLUTION PARENTERAL at 04:53

## 2018-12-19 RX ADMIN — SODIUM CHLORIDE: 9 INJECTION, SOLUTION INTRAVENOUS at 06:11

## 2018-12-19 RX ADMIN — Medication 10 ML: at 21:16

## 2018-12-19 RX ADMIN — POTASSIUM CHLORIDE 10 MEQ: 7.46 INJECTION, SOLUTION INTRAVENOUS at 12:51

## 2018-12-19 RX ADMIN — MORPHINE SULFATE 2 MG: 2 INJECTION, SOLUTION INTRAMUSCULAR; INTRAVENOUS at 04:14

## 2018-12-19 RX ADMIN — POTASSIUM CHLORIDE 10 MEQ: 7.46 INJECTION, SOLUTION INTRAVENOUS at 23:18

## 2018-12-19 RX ADMIN — SODIUM CHLORIDE: 9 INJECTION, SOLUTION INTRAVENOUS at 06:02

## 2018-12-19 RX ADMIN — DEXTROSE AND SODIUM CHLORIDE: 5; 450 INJECTION, SOLUTION INTRAVENOUS at 22:22

## 2018-12-19 RX ADMIN — POTASSIUM PHOSPHATE, MONOBASIC AND POTASSIUM PHOSPHATE, DIBASIC 15 MMOL: 224; 236 INJECTION, SOLUTION, CONCENTRATE INTRAVENOUS at 13:43

## 2018-12-19 RX ADMIN — POTASSIUM CHLORIDE 10 MEQ: 7.46 INJECTION, SOLUTION INTRAVENOUS at 21:01

## 2018-12-19 RX ADMIN — MORPHINE SULFATE 2 MG: 2 INJECTION, SOLUTION INTRAMUSCULAR; INTRAVENOUS at 10:52

## 2018-12-19 RX ADMIN — SODIUM CHLORIDE 0.1 UNITS/KG/HR: 9 INJECTION, SOLUTION INTRAVENOUS at 05:15

## 2018-12-19 RX ADMIN — POTASSIUM CHLORIDE 10 MEQ: 7.46 INJECTION, SOLUTION INTRAVENOUS at 22:11

## 2018-12-19 RX ADMIN — SODIUM CHLORIDE 1000 ML: 9 INJECTION, SOLUTION INTRAVENOUS at 04:05

## 2018-12-19 RX ADMIN — MAGNESIUM SULFATE HEPTAHYDRATE 1 G: 1 INJECTION, SOLUTION INTRAVENOUS at 22:12

## 2018-12-19 RX ADMIN — CEFEPIME HYDROCHLORIDE 2 G: 2 INJECTION, POWDER, FOR SOLUTION INTRAVENOUS at 06:00

## 2018-12-19 RX ADMIN — POTASSIUM CHLORIDE 10 MEQ: 7.46 INJECTION, SOLUTION INTRAVENOUS at 09:13

## 2018-12-19 RX ADMIN — SODIUM CHLORIDE 2000 ML: 9 INJECTION, SOLUTION INTRAVENOUS at 04:09

## 2018-12-19 RX ADMIN — POTASSIUM CHLORIDE 10 MEQ: 7.46 INJECTION, SOLUTION INTRAVENOUS at 10:30

## 2018-12-19 RX ADMIN — SODIUM CHLORIDE: 9 INJECTION, SOLUTION INTRAVENOUS at 07:23

## 2018-12-19 RX ADMIN — MAGNESIUM SULFATE HEPTAHYDRATE 1 G: 1 INJECTION, SOLUTION INTRAVENOUS at 21:01

## 2018-12-19 RX ADMIN — PROMETHAZINE HYDROCHLORIDE 12.5 MG: 25 INJECTION INTRAMUSCULAR; INTRAVENOUS at 05:13

## 2018-12-19 ASSESSMENT — PAIN DESCRIPTION - DESCRIPTORS
DESCRIPTORS: DISCOMFORT;DULL
DESCRIPTORS: ACHING;SORE

## 2018-12-19 ASSESSMENT — PAIN SCALES - GENERAL
PAINLEVEL_OUTOF10: 0
PAINLEVEL_OUTOF10: 8
PAINLEVEL_OUTOF10: 0
PAINLEVEL_OUTOF10: 10
PAINLEVEL_OUTOF10: 10
PAINLEVEL_OUTOF10: 0
PAINLEVEL_OUTOF10: 0
PAINLEVEL_OUTOF10: 10

## 2018-12-19 ASSESSMENT — ENCOUNTER SYMPTOMS
CHEST TIGHTNESS: 0
BACK PAIN: 0
WHEEZING: 0
NAUSEA: 1
ABDOMINAL PAIN: 1
BLOOD IN STOOL: 0
DIARRHEA: 0
SHORTNESS OF BREATH: 1
VOMITING: 1
RHINORRHEA: 0
SORE THROAT: 0
CONSTIPATION: 0
COUGH: 0

## 2018-12-19 ASSESSMENT — PAIN DESCRIPTION - ONSET
ONSET: ON-GOING
ONSET: ON-GOING

## 2018-12-19 ASSESSMENT — PAIN DESCRIPTION - LOCATION
LOCATION: GENERALIZED
LOCATION: ABDOMEN

## 2018-12-19 ASSESSMENT — PAIN DESCRIPTION - PROGRESSION
CLINICAL_PROGRESSION: NOT CHANGED
CLINICAL_PROGRESSION: GRADUALLY WORSENING

## 2018-12-19 ASSESSMENT — PAIN DESCRIPTION - PAIN TYPE
TYPE: ACUTE PAIN
TYPE: ACUTE PAIN

## 2018-12-19 ASSESSMENT — PAIN DESCRIPTION - FREQUENCY
FREQUENCY: CONTINUOUS
FREQUENCY: CONTINUOUS

## 2018-12-19 NOTE — ED PROVIDER NOTES
threatening deterioration or permanent disability. Accordingly this patient received 30 minutes of critical care time, excluding separately billable procedures. [SHERIF]   0954 D/w dr Roman hendricks/critical care on call for intensivist aware of pt status accepts to icu  [SHERIF]   [phone] D/w dr Mayito Garsia accepts to icu  [SHERIF]      ED Course User Index  [SHERIF] Santana Oscar, DO       --------------------------------------------- PAST HISTORY ---------------------------------------------  Past Medical History:  has a past medical history of Bleeding at insertion site; Common femoral artery injury, right, initial encounter; Diabetes mellitus ([facility] [address] and DKA, type 2, not at goal [facility]). Past Surgical History:  has no past surgical history on file. Social History:  reports that she has never smoked. She has never used smokeless tobacco. She reports that she does not drink alcohol or use drugs. Family History: family history is not on file. The patients home medications have been reviewed. Allergies: Patient has no known allergies.     -------------------------------------------------- RESULTS -------------------------------------------------    LABS:  Results for orders placed or performed during the hospital encounter of 12/19/18   CBC Auto Differential   Result Value Ref Range    WBC 26.0 (H) 4.5 - 11.5 E9/L    RBC 5.63 (H) 3.50 - 5.50 E12/L    Hemoglobin 16.4 (H) 11.5 - 15.5 g/dL    Hematocrit 50.5 (H) 34.0 - 48.0 %    MCV 89.7 80.0 - 99.9 fL    MCH 29.1 26.0 - 35.0 pg    MCHC 32.5 32.0 - 34.5 %    RDW 12.7 11.5 - 15.0 fL    Platelets 205 (H) 308 - 450 E9/L    MPV 11.0 7.0 - 12.0 fL    Neutrophils % 72.5 43.0 - 80.0 %    Immature Granulocytes % 0.8 0.0 - 5.0 %    Lymphocytes % 22.1 20.0 - 42.0 %    Monocytes % 4.1 2.0 - 12.0 %    Eosinophils % 0.1 0.0 - 6.0 %    Basophils % 0.4 0.0 - 2.0 %    Neutrophils # 18.85 (H) 1.80 - 7.30 E9/L    Immature Granulocytes # 0.21 E9/L    Lymphocytes # 5.75 (H) 1.50 - 4.00  ID [id number]     Lab [id number]     Critical(s) Notified Handed report to /RN    Urine Drug Screen   Result Value Ref Range    Amphetamine Screen, Urine NOT DETECTED Negative <1000 ng/mL    Barbiturate Screen, Ur NOT DETECTED Negative < 200 ng/mL    Benzodiazepine Screen, Urine NOT DETECTED Negative < 200 ng/mL    Cannabinoid Scrn, Ur NOT DETECTED Negative < 50ng/mL    Cocaine Metabolite Screen, Urine NOT DETECTED Negative < 300 ng/mL    Opiate Scrn, Ur POSITIVE (A) Negative < 300ng/mL    PCP Screen, Urine NOT DETECTED Negative < 25 ng/mL    Methadone Screen, Urine NOT DETECTED Negative <300 ng/mL    Propoxyphene Scrn, Ur NOT DETECTED Negative <300 ng/mL   Urinalysis   Result Value Ref Range    Color, UA Yellow Straw/Yellow    Clarity, UA Clear Clear    Glucose, Ur 500 (A) Negative mg/dL    Bilirubin Urine Negative Negative    Ketones, Urine >=80 (A) Negative mg/dL    Specific Gravity, UA 1.025 1.005 - 1.030    Blood, Urine TRACE-INTACT Negative    pH, UA 5.5 5.0 - 9.0    Protein, UA 30 (A) Negative mg/dL    Urobilinogen, Urine 0.2 <2.0 E.U./dL    Nitrite, Urine Negative Negative    Leukocyte Esterase, Urine TRACE (A) Negative   Microscopic Urinalysis   Result Value Ref Range    WBC, UA 0-1 0 - 5 /HPF    RBC, UA 1-3 0 - 2 /HPF    Epi Cells RARE /HPF    Bacteria, UA NONE /HPF   POCT glucose   Result Value Ref Range    Glucose 489 mg/dL    QC OK? yes    POCT Glucose   Result Value Ref Range    Meter Glucose 489 (H) 74 - 99 mg/dL   POCT Glucose   Result Value Ref Range    Glucose 494 mg/dL    QC OK? yes    POCT Glucose   Result Value Ref Range    Meter Glucose 494 (H) 74 - 99 mg/dL   EKG 12 Lead   Result Value Ref Range    Ventricular Rate 140 BPM    Atrial Rate 140 BPM    P-R Interval 120 ms    QRS Duration 60 ms    Q-T Interval 356 ms    QTc Calculation (Bazett) 543 ms    P Axis 79 degrees    R Axis 63 degrees    T Axis 83 degrees       RADIOLOGY:  CT ABDOMEN PELVIS WO CONTRAST   Final Result   1.  Small right

## 2018-12-19 NOTE — CONSULTS
Critical Care Admit/Consult Note         Patient Smooth Perez   MRN -  [id number]   Acct # - [de-identified]   - 1995      Date of Admission -  2018  3:24 AM  Date of evaluation -  2018-A   Hospital Day - 0          ADMIT/CONSULT DETAILS     Reason for Admit/Consult   DKA    Consulting Lynn Nation MD  Primary Care Physician - Nory Butterfield,        HPI   The patient is a 26 y.o. female with significant past medical history of type 1 DM previously discharged from the hospital for DKA 18 presented to ED for SOB, abdominal pain, emesis and hyperglycemia. States she has been compliant with her SSI, states this is all she takes for her diabetes. She has been coughing for a couple of days, productive of clear sputum. States she has been nauseated with some vomiting. Abdominal pain has been diffuse, she has been eating/drinking less secondary to pain. Also complains of \"pain in my kidneys\" that just started today with polyuria, but denies dysuria or frequency. Patient denies sick contacts, travel. She works at [employer]. In the ED, she was found to be in DKA and started on fluids and insulin drip. She was tachycardic and tachypneic. WBC elevated at 26. Gap 36. Alk phosph elevated at 159. Lactic 3.1. UA showed trace leuk esterase. CT abdomen obtained which showed a small right ovarian cyst. She was given a dose of cefepime in ED and admitted to ICU. Past Medical History         Diagnosis Date    Bleeding at insertion site 2018    Common femoral artery injury, right, initial encounter 2018    Diabetes mellitus ([address]     DKA, type 2, not at goal [facility]) 2018        Past Surgical History     History reviewed. No pertinent surgical history.     Current Medications   Current Medications    cefepime  2 g Intravenous Once     dextrose, potassium chloride, magnesium sulfate, sodium phosphate IVPB **OR** sodium phosphate IVPB **OR** increase in frequency  Musculoskeletal: lower extremity edema, myalgias, arthralgias, back pain  Integumentary: rashes, itching   Neurological: headache, lightheadedness, dizziness, confusion, syncope, numbness, tingling, focal weakness  Psychiatric: depression, suicidal ideation, or anxiety  Endocrine: unintentional weight change  Hematologic/Lymphatic: lymphadenopathy, easy bruising, easy bleeding   Allergic/Immunologic: recurrent infections      Lines and Devices   PIVs    Drains/Tubes/Outputs       Mechanical Ventilation Data   VENT SETTINGS (Comprehensive)     Additional Respiratory  Assessments  Pulse: 137  Resp: 26  SpO2: 96 %    ABG  Lab Results   Component Value Date    PH 7.121 2018    PCO2 <15.0 2018    PO2 130.3 2018    HCO3 8.4 2018    O2SAT 98.0 2018     Lab Results   Component Value Date    MODE RA 2018         Vitals    height is 5' 1\" (1.549 m) and weight is 115 lb (52.2 kg). Her oral temperature is 97.9 °F (36.6 °C). Her blood pressure is 149/78 (abnormal) and her pulse is 137. Her respiration is 26 and oxygen saturation is 96%.        Temperature Range: Temp: 97.9 °F (36.6 °C) Temp  Av.9 °F (36.6 °C)  Min: 97.8 °F (36.6 °C)  Max: 97.9 °F (36.6 °C)  BP Range:  Systolic (76GUJ), QRR:452 , Min:130 , JZN:659     Diastolic (45ALJ), LID:12, Min:78, Max:90    Pulse Range: Pulse  Av.2  Min: 137  Max: 148  Respiration Range: Resp  Av.7  Min: 24  Max: 26  Current Pulse Ox[de-identified]  SpO2: 96 %  24HR Pulse Ox Range:  SpO2  Av.3 %  Min: 96 %  Max: 97 %  Oxygen Amount and Delivery:      I/O (24 Hours)    Patient Vitals for the past 8 hrs:   BP Temp Temp src Pulse Resp SpO2 Height Weight   18 0710 (!) 149/78 97.9 °F (36.6 °C) Oral 137 26 96 % - -   18 0607 (!) 177/90 97.8 °F (36.6 °C) Oral 148 24 96 % - -   18 0523 - - - 148 - - - -   18 0353 - - - 140 - - - -   18 0330 (!) 130/90 97.9 °F (36.6 °C) Axillary 138 24 97 % 5' 1\" (1.549 m)

## 2018-12-20 LAB
ANION GAP SERPL CALCULATED.3IONS-SCNC: 14 MMOL/L (ref 7–16)
ANION GAP SERPL CALCULATED.3IONS-SCNC: 15 MMOL/L (ref 7–16)
ANION GAP SERPL CALCULATED.3IONS-SCNC: 15 MMOL/L (ref 7–16)
ANION GAP SERPL CALCULATED.3IONS-SCNC: 16 MMOL/L (ref 7–16)
ANION GAP SERPL CALCULATED.3IONS-SCNC: 16 MMOL/L (ref 7–16)
ANION GAP SERPL CALCULATED.3IONS-SCNC: 18 MMOL/L (ref 7–16)
BASOPHILS ABSOLUTE: 0.04 E9/L (ref 0–0.2)
BASOPHILS RELATIVE PERCENT: 0.3 % (ref 0–2)
BUN BLDV-MCNC: 2 MG/DL (ref 6–20)
BUN BLDV-MCNC: 3 MG/DL (ref 6–20)
BUN BLDV-MCNC: 4 MG/DL (ref 6–20)
BUN BLDV-MCNC: 6 MG/DL (ref 6–20)
BUN BLDV-MCNC: 7 MG/DL (ref 6–20)
BUN BLDV-MCNC: <2 MG/DL (ref 6–20)
CALCIUM SERPL-MCNC: 7.4 MG/DL (ref 8.6–10.2)
CALCIUM SERPL-MCNC: 8.2 MG/DL (ref 8.6–10.2)
CALCIUM SERPL-MCNC: 8.3 MG/DL (ref 8.6–10.2)
CALCIUM SERPL-MCNC: 8.4 MG/DL (ref 8.6–10.2)
CALCIUM SERPL-MCNC: 8.4 MG/DL (ref 8.6–10.2)
CALCIUM SERPL-MCNC: 8.5 MG/DL (ref 8.6–10.2)
CHLORIDE BLD-SCNC: 103 MMOL/L (ref 98–107)
CHLORIDE BLD-SCNC: 105 MMOL/L (ref 98–107)
CHLORIDE BLD-SCNC: 107 MMOL/L (ref 98–107)
CHLORIDE BLD-SCNC: 108 MMOL/L (ref 98–107)
CHLORIDE BLD-SCNC: 109 MMOL/L (ref 98–107)
CHLORIDE BLD-SCNC: 98 MMOL/L (ref 98–107)
CLUE CELLS: NORMAL
CO2: 11 MMOL/L (ref 22–29)
CO2: 12 MMOL/L (ref 22–29)
CO2: 14 MMOL/L (ref 22–29)
CO2: 14 MMOL/L (ref 22–29)
CO2: 15 MMOL/L (ref 22–29)
CO2: 18 MMOL/L (ref 22–29)
CREAT SERPL-MCNC: 0.5 MG/DL (ref 0.5–1)
CREAT SERPL-MCNC: 0.6 MG/DL (ref 0.5–1)
CREAT SERPL-MCNC: 0.8 MG/DL (ref 0.5–1)
EOSINOPHILS ABSOLUTE: 0.04 E9/L (ref 0.05–0.5)
EOSINOPHILS RELATIVE PERCENT: 0.3 % (ref 0–6)
FILM ARRAY ADENOVIRUS: NORMAL
FILM ARRAY BORDETELLA PERTUSSIS: NORMAL
FILM ARRAY CHLAMYDOPHILIA PNEUMONIAE: NORMAL
FILM ARRAY CORONAVIRUS 229E: NORMAL
FILM ARRAY CORONAVIRUS HKU1: NORMAL
FILM ARRAY CORONAVIRUS NL63: NORMAL
FILM ARRAY CORONAVIRUS OC43: NORMAL
FILM ARRAY INFLUENZA A VIRUS 09H1: NORMAL
FILM ARRAY INFLUENZA A VIRUS H1: NORMAL
FILM ARRAY INFLUENZA A VIRUS H3: NORMAL
FILM ARRAY INFLUENZA A VIRUS: NORMAL
FILM ARRAY INFLUENZA B: NORMAL
FILM ARRAY METAPNEUMOVIRUS: NORMAL
FILM ARRAY MYCOPLASMA PNEUMONIAE: NORMAL
FILM ARRAY PARAINFLUENZA VIRUS 1: NORMAL
FILM ARRAY PARAINFLUENZA VIRUS 2: NORMAL
FILM ARRAY PARAINFLUENZA VIRUS 3: NORMAL
FILM ARRAY PARAINFLUENZA VIRUS 4: NORMAL
FILM ARRAY RESPIRATORY SYNCITIAL VIRUS: NORMAL
FILM ARRAY RHINOVIRUS/ENTEROVIRUS: NORMAL
GFR AFRICAN AMERICAN: >60
GFR NON-AFRICAN AMERICAN: >60 ML/MIN/1.73
GLUCOSE BLD-MCNC: 119 MG/DL (ref 74–99)
GLUCOSE BLD-MCNC: 149 MG/DL (ref 74–99)
GLUCOSE BLD-MCNC: 153 MG/DL (ref 74–99)
GLUCOSE BLD-MCNC: 176 MG/DL (ref 74–99)
GLUCOSE BLD-MCNC: 211 MG/DL (ref 74–99)
GLUCOSE BLD-MCNC: 226 MG/DL (ref 74–99)
HCT VFR BLD CALC: 35.2 % (ref 34–48)
HEMOGLOBIN: 12.1 G/DL (ref 11.5–15.5)
IMMATURE GRANULOCYTES #: 0.07 E9/L
IMMATURE GRANULOCYTES %: 0.5 % (ref 0–5)
LYMPHOCYTES ABSOLUTE: 2.92 E9/L (ref 1.5–4)
LYMPHOCYTES RELATIVE PERCENT: 21.6 % (ref 20–42)
MAGNESIUM: 1.7 MG/DL (ref 1.6–2.6)
MAGNESIUM: 1.7 MG/DL (ref 1.6–2.6)
MAGNESIUM: 1.9 MG/DL (ref 1.6–2.6)
MAGNESIUM: 2.4 MG/DL (ref 1.6–2.6)
MCH RBC QN AUTO: 29.5 PG (ref 26–35)
MCHC RBC AUTO-ENTMCNC: 34.4 % (ref 32–34.5)
MCV RBC AUTO: 85.9 FL (ref 80–99.9)
METER GLUCOSE: 107 MG/DL (ref 74–99)
METER GLUCOSE: 134 MG/DL (ref 74–99)
METER GLUCOSE: 136 MG/DL (ref 74–99)
METER GLUCOSE: 145 MG/DL (ref 74–99)
METER GLUCOSE: 146 MG/DL (ref 74–99)
METER GLUCOSE: 152 MG/DL (ref 74–99)
METER GLUCOSE: 157 MG/DL (ref 74–99)
METER GLUCOSE: 157 MG/DL (ref 74–99)
METER GLUCOSE: 158 MG/DL (ref 74–99)
METER GLUCOSE: 161 MG/DL (ref 74–99)
METER GLUCOSE: 170 MG/DL (ref 74–99)
METER GLUCOSE: 180 MG/DL (ref 74–99)
METER GLUCOSE: 216 MG/DL (ref 74–99)
METER GLUCOSE: 222 MG/DL (ref 74–99)
METER GLUCOSE: 271 MG/DL (ref 74–99)
MONOCYTES ABSOLUTE: 1.01 E9/L (ref 0.1–0.95)
MONOCYTES RELATIVE PERCENT: 7.5 % (ref 2–12)
MRSA CULTURE ONLY: NORMAL
NEUTROPHILS ABSOLUTE: 9.41 E9/L (ref 1.8–7.3)
NEUTROPHILS RELATIVE PERCENT: 69.8 % (ref 43–80)
PDW BLD-RTO: 12.6 FL (ref 11.5–15)
PHOSPHORUS: 2.1 MG/DL (ref 2.5–4.5)
PHOSPHORUS: 2.3 MG/DL (ref 2.5–4.5)
PHOSPHORUS: 2.4 MG/DL (ref 2.5–4.5)
PHOSPHORUS: 2.6 MG/DL (ref 2.5–4.5)
PHOSPHORUS: 2.7 MG/DL (ref 2.5–4.5)
PHOSPHORUS: 3 MG/DL (ref 2.5–4.5)
PLATELET # BLD: 274 E9/L (ref 130–450)
PMV BLD AUTO: 11 FL (ref 7–12)
POTASSIUM SERPL-SCNC: 3.7 MMOL/L (ref 3.5–5)
POTASSIUM SERPL-SCNC: 4 MMOL/L (ref 3.5–5)
POTASSIUM SERPL-SCNC: 4 MMOL/L (ref 3.5–5)
POTASSIUM SERPL-SCNC: 4.1 MMOL/L (ref 3.5–5)
POTASSIUM SERPL-SCNC: 4.4 MMOL/L (ref 3.5–5)
POTASSIUM SERPL-SCNC: 4.5 MMOL/L (ref 3.5–5)
RBC # BLD: 4.1 E12/L (ref 3.5–5.5)
SODIUM BLD-SCNC: 132 MMOL/L (ref 132–146)
SODIUM BLD-SCNC: 134 MMOL/L (ref 132–146)
SODIUM BLD-SCNC: 134 MMOL/L (ref 132–146)
SODIUM BLD-SCNC: 135 MMOL/L (ref 132–146)
SODIUM BLD-SCNC: 136 MMOL/L (ref 132–146)
SODIUM BLD-SCNC: 137 MMOL/L (ref 132–146)
SOURCE WET PREP: NORMAL
TRICHOMONAS PREP: NORMAL
WBC # BLD: 13.5 E9/L (ref 4.5–11.5)
YEAST WET PREP: NORMAL

## 2018-12-20 PROCEDURE — 6370000000 HC RX 637 (ALT 250 FOR IP): Performed by: EMERGENCY MEDICINE

## 2018-12-20 PROCEDURE — 87491 CHLMYD TRACH DNA AMP PROBE: CPT

## 2018-12-20 PROCEDURE — 6360000002 HC RX W HCPCS: Performed by: INTERNAL MEDICINE

## 2018-12-20 PROCEDURE — 80048 BASIC METABOLIC PNL TOTAL CA: CPT

## 2018-12-20 PROCEDURE — 2580000003 HC RX 258: Performed by: EMERGENCY MEDICINE

## 2018-12-20 PROCEDURE — 6370000000 HC RX 637 (ALT 250 FOR IP): Performed by: OBSTETRICS & GYNECOLOGY

## 2018-12-20 PROCEDURE — 85025 COMPLETE CBC W/AUTO DIFF WBC: CPT

## 2018-12-20 PROCEDURE — 83735 ASSAY OF MAGNESIUM: CPT

## 2018-12-20 PROCEDURE — 36415 COLL VENOUS BLD VENIPUNCTURE: CPT

## 2018-12-20 PROCEDURE — 2580000003 HC RX 258: Performed by: INTERNAL MEDICINE

## 2018-12-20 PROCEDURE — 82962 GLUCOSE BLOOD TEST: CPT

## 2018-12-20 PROCEDURE — 2500000003 HC RX 250 WO HCPCS: Performed by: EMERGENCY MEDICINE

## 2018-12-20 PROCEDURE — 6360000002 HC RX W HCPCS: Performed by: EMERGENCY MEDICINE

## 2018-12-20 PROCEDURE — 2500000003 HC RX 250 WO HCPCS: Performed by: INTERNAL MEDICINE

## 2018-12-20 PROCEDURE — 87591 N.GONORRHOEAE DNA AMP PROB: CPT

## 2018-12-20 PROCEDURE — 87210 SMEAR WET MOUNT SALINE/INK: CPT

## 2018-12-20 PROCEDURE — 6370000000 HC RX 637 (ALT 250 FOR IP): Performed by: INTERNAL MEDICINE

## 2018-12-20 PROCEDURE — 84100 ASSAY OF PHOSPHORUS: CPT

## 2018-12-20 PROCEDURE — 87529 HSV DNA AMP PROBE: CPT

## 2018-12-20 PROCEDURE — 2000000000 HC ICU R&B

## 2018-12-20 RX ORDER — AZITHROMYCIN 250 MG/1
1000 TABLET, FILM COATED ORAL ONCE
Status: COMPLETED | OUTPATIENT
Start: 2018-12-20 | End: 2018-12-20

## 2018-12-20 RX ORDER — DEXTROSE MONOHYDRATE 25 G/50ML
12.5 INJECTION, SOLUTION INTRAVENOUS PRN
Status: DISCONTINUED | OUTPATIENT
Start: 2018-12-20 | End: 2018-12-22 | Stop reason: HOSPADM

## 2018-12-20 RX ORDER — SODIUM CHLORIDE 450 MG/100ML
INJECTION, SOLUTION INTRAVENOUS CONTINUOUS
Status: DISCONTINUED | OUTPATIENT
Start: 2018-12-20 | End: 2018-12-20

## 2018-12-20 RX ORDER — POTASSIUM CHLORIDE 7.45 MG/ML
10 INJECTION INTRAVENOUS PRN
Status: DISCONTINUED | OUTPATIENT
Start: 2018-12-20 | End: 2018-12-21

## 2018-12-20 RX ORDER — INSULIN GLARGINE 100 [IU]/ML
15 INJECTION, SOLUTION SUBCUTANEOUS ONCE
Status: COMPLETED | OUTPATIENT
Start: 2018-12-20 | End: 2018-12-20

## 2018-12-20 RX ORDER — DEXTROSE AND SODIUM CHLORIDE 5; .45 G/100ML; G/100ML
INJECTION, SOLUTION INTRAVENOUS CONTINUOUS
Status: DISCONTINUED | OUTPATIENT
Start: 2018-12-20 | End: 2018-12-21

## 2018-12-20 RX ORDER — MAGNESIUM SULFATE 1 G/100ML
1 INJECTION INTRAVENOUS PRN
Status: DISCONTINUED | OUTPATIENT
Start: 2018-12-20 | End: 2018-12-21

## 2018-12-20 RX ORDER — CEFTRIAXONE SODIUM 250 MG/1
250 INJECTION, POWDER, FOR SOLUTION INTRAMUSCULAR; INTRAVENOUS ONCE
Status: COMPLETED | OUTPATIENT
Start: 2018-12-20 | End: 2018-12-20

## 2018-12-20 RX ORDER — 0.9 % SODIUM CHLORIDE 0.9 %
15 INTRAVENOUS SOLUTION INTRAVENOUS ONCE
Status: DISCONTINUED | OUTPATIENT
Start: 2018-12-20 | End: 2018-12-20

## 2018-12-20 RX ORDER — ACYCLOVIR 200 MG/1
200 CAPSULE ORAL
Status: DISCONTINUED | OUTPATIENT
Start: 2018-12-20 | End: 2018-12-22 | Stop reason: HOSPADM

## 2018-12-20 RX ORDER — LIDOCAINE HYDROCHLORIDE 10 MG/ML
0.9 INJECTION, SOLUTION EPIDURAL; INFILTRATION; INTRACAUDAL; PERINEURAL ONCE
Status: COMPLETED | OUTPATIENT
Start: 2018-12-20 | End: 2018-12-20

## 2018-12-20 RX ORDER — DEXTROSE AND SODIUM CHLORIDE 5; .45 G/100ML; G/100ML
INJECTION, SOLUTION INTRAVENOUS CONTINUOUS PRN
Status: DISCONTINUED | OUTPATIENT
Start: 2018-12-20 | End: 2018-12-21

## 2018-12-20 RX ADMIN — ACYCLOVIR 200 MG: 200 CAPSULE ORAL at 21:15

## 2018-12-20 RX ADMIN — POTASSIUM CHLORIDE 10 MEQ: 7.46 INJECTION, SOLUTION INTRAVENOUS at 08:48

## 2018-12-20 RX ADMIN — MAGNESIUM SULFATE HEPTAHYDRATE 1 G: 1 INJECTION, SOLUTION INTRAVENOUS at 21:08

## 2018-12-20 RX ADMIN — AZITHROMYCIN 1000 MG: 250 TABLET, FILM COATED ORAL at 14:37

## 2018-12-20 RX ADMIN — INSULIN LISPRO 6 UNITS: 100 INJECTION, SOLUTION INTRAVENOUS; SUBCUTANEOUS at 16:51

## 2018-12-20 RX ADMIN — DEXTROSE AND SODIUM CHLORIDE: 5; 450 INJECTION, SOLUTION INTRAVENOUS at 23:19

## 2018-12-20 RX ADMIN — POTASSIUM CHLORIDE 10 MEQ: 7.46 INJECTION, SOLUTION INTRAVENOUS at 11:37

## 2018-12-20 RX ADMIN — DEXTROSE AND SODIUM CHLORIDE: 5; 450 INJECTION, SOLUTION INTRAVENOUS at 19:37

## 2018-12-20 RX ADMIN — SODIUM CHLORIDE 5.22 UNITS/HR: 9 INJECTION, SOLUTION INTRAVENOUS at 19:37

## 2018-12-20 RX ADMIN — POTASSIUM CHLORIDE 10 MEQ: 7.46 INJECTION, SOLUTION INTRAVENOUS at 20:41

## 2018-12-20 RX ADMIN — LIDOCAINE HYDROCHLORIDE 0.9 ML: 10 INJECTION, SOLUTION EPIDURAL; INFILTRATION; INTRACAUDAL; PERINEURAL at 20:25

## 2018-12-20 RX ADMIN — ACYCLOVIR 200 MG: 200 CAPSULE ORAL at 23:06

## 2018-12-20 RX ADMIN — CEFTRIAXONE SODIUM 250 MG: 250 INJECTION, POWDER, FOR SOLUTION INTRAMUSCULAR; INTRAVENOUS at 14:37

## 2018-12-20 RX ADMIN — POTASSIUM CHLORIDE 10 MEQ: 7.46 INJECTION, SOLUTION INTRAVENOUS at 10:19

## 2018-12-20 RX ADMIN — POTASSIUM PHOSPHATE, MONOBASIC AND POTASSIUM PHOSPHATE, DIBASIC 10 MMOL: 224; 236 INJECTION, SOLUTION, CONCENTRATE INTRAVENOUS at 01:11

## 2018-12-20 RX ADMIN — POTASSIUM CHLORIDE 10 MEQ: 7.46 INJECTION, SOLUTION INTRAVENOUS at 12:52

## 2018-12-20 RX ADMIN — MORPHINE SULFATE 2 MG: 2 INJECTION, SOLUTION INTRAMUSCULAR; INTRAVENOUS at 20:32

## 2018-12-20 RX ADMIN — DEXTROSE AND SODIUM CHLORIDE: 5; 450 INJECTION, SOLUTION INTRAVENOUS at 11:35

## 2018-12-20 RX ADMIN — POTASSIUM CHLORIDE 10 MEQ: 7.46 INJECTION, SOLUTION INTRAVENOUS at 07:44

## 2018-12-20 RX ADMIN — MAGNESIUM SULFATE HEPTAHYDRATE 1 G: 1 INJECTION, SOLUTION INTRAVENOUS at 22:01

## 2018-12-20 RX ADMIN — POTASSIUM CHLORIDE 10 MEQ: 7.46 INJECTION, SOLUTION INTRAVENOUS at 19:36

## 2018-12-20 RX ADMIN — INSULIN GLARGINE 15 UNITS: 100 INJECTION, SOLUTION SUBCUTANEOUS at 14:27

## 2018-12-20 RX ADMIN — DEXTROSE AND SODIUM CHLORIDE: 5; 450 INJECTION, SOLUTION INTRAVENOUS at 04:45

## 2018-12-20 ASSESSMENT — PAIN DESCRIPTION - PAIN TYPE: TYPE: ACUTE PAIN

## 2018-12-20 ASSESSMENT — PAIN SCALES - GENERAL
PAINLEVEL_OUTOF10: 9
PAINLEVEL_OUTOF10: 0

## 2018-12-20 ASSESSMENT — PAIN DESCRIPTION - LOCATION: LOCATION: VAGINA

## 2018-12-20 ASSESSMENT — PAIN DESCRIPTION - DESCRIPTORS: DESCRIPTORS: DISCOMFORT

## 2018-12-20 ASSESSMENT — PAIN DESCRIPTION - ORIENTATION: ORIENTATION: LOWER;MID

## 2018-12-20 ASSESSMENT — PAIN DESCRIPTION - ONSET: ONSET: GRADUAL

## 2018-12-20 ASSESSMENT — PAIN DESCRIPTION - FREQUENCY: FREQUENCY: CONTINUOUS

## 2018-12-20 NOTE — PROGRESS NOTES
Labs      12/19/18   0358   PH  7.121*   PCO2  <15.0*   PO2  130.3*   O2SAT  98.0       Laboratory findings:    Complete Blood Count: Recent Labs      12/19/18   0402  12/20/18   0625   WBC  26.0*  13.5*   HGB  16.4*  12.1   HCT  50.5*  35.2   PLT  472*  274        Last 3 Blood Glucose:   Recent Labs      12/20/18   0001  12/20/18   0625  12/20/18   0845   GLUCOSE  149*  153*  119*        PT/INR:  No results found for: PROTIME, INR  PTT:  No results found for: APTT, PTT    Comprehensive Metabolic Profile:   Recent Labs      12/19/18   0402   12/20/18   0001  12/20/18   0625  12/20/18   0845   NA  133   < >  134  135  137   K  5.3*   < >  4.0  4.0  4.1   CL  92*   < >  109*  107  108*   CO2  5*   < >  11*  12*  14*   BUN  17   < >  4*  3*  2*   CREATININE  0.8   < >  0.5  0.5  0.5   GLUCOSE  594*   < >  149*  153*  119*   CALCIUM  10.1   < >  7.4*  8.3*  8.4*   PROT  9.6*   --    --    --    --    LABALBU  4.8   --    --    --    --    BILITOT  0.2   --    --    --    --    ALKPHOS  159*   --    --    --    --    AST  28   --    --    --    --    ALT  24   --    --    --    --     < > = values in this interval not displayed. Magnesium:   Lab Results   Component Value Date    MG 1.9 12/20/2018     Phosphorus:   Lab Results   Component Value Date    PHOS 2.7 12/20/2018     Ionized Calcium: No results found for: CAION     Urinalysis:     Troponin: No results for input(s): TROPONINI in the last 72 hours.     Microbiology:  Cultures during this admission:     Blood cultures:                 [] None drawn      [] Negative             []  Positive (Details:  )  Urine Culture:                   [] None drawn      [] Negative             [x]  In process    Sputum Culture:               [] None drawn       [] Negative             []  Positive (Details:  )   Endotracheal aspirate:     [] None drawn       [] Negative             []  Positive (Details:  )         ASSESSMENT:     Principal Problem:    DKA (diabetic ketoacidosis) ([facility] [address]  Active Problems:    Diabetes mellitus type 1, uncontrolled ([facility] [address]    Personal history of noncompliance with medical treatment, presenting hazards to health    DKA, type 1, not at goal [facility])  Resolved Problems:    DKA, type 2, not at goal [facility])      Additional assessment:    Vaginal discharge, lesions      PLAN:     WEAN PER PROTOCOL:  [] No   [x] Yes  [] N/A    DISCONTINUE ANY LABS:   [] No   [x] Yes    ICU PROPHYLAXIS:  Stress ulcer:  [] PPI Agent  [] G0Iflrc [] Sucralfate  [x] Other:  VTE:   [x] Enoxaparin  [] Unfract. Heparin Subcut  [] EPC Cuffs    NUTRITION:  [] NPO [] Tube Feeding (Specify: ) [] TPN  [x] PO (Diet: DIET CARB CONTROL; Carb Control: 4 carbs/meal (approximate 1800 kcals/day))    HOME MEDICATIONS RECONCILED: [] No  [x] Yes    INSULIN DRIP:   [] No   [x] Yes    CONSULTATION NEEDED:  [] No   [x] Yes    FAMILY UPDATED:    [] No   [x] Yes    TRANSFER OUT OF ICU:   [] No   [x] Yes    ADDITIONAL PLAN:  1. Gap closed, resume diet, stop insulin drip  2. Pelvic exam, swab for GC, trich, herpes  3. Once patient tolerates diet, will plan to resume subq insulin   4. Will plan to transfer to floor when she is off of the insulin infusion        Sujey Vivar DO  Resident, PGY-2  12/20/2018  12:52 PM    I personally saw, examined and provided care for the patient. Radiographs, labs and medication list were reviewed by me independently. I spoke with bedside nursing, therapists and consultants. Critical care services and times documented are independent of procedures and multidisciplinary rounds with Residents. Additionally comprehensive, multidisciplinary rounds were conducted with the MICU team. The case was discussed in detail and plans for care were established. Review of Residents documentation was conducted and revisions were made as appropriate.  I agree with the above documented exam, problem list and plan of care with the following additions:    Clinically improved  Acidosis slow to

## 2018-12-20 NOTE — PROGRESS NOTES
..  Intensive Care Daily Quality Rounding Checklist      ICU Team Members:   ICU Day #: 2    Intubation Date: NA    Ventilator Day #: NA    Central Line Insertion Date: NA        Day #: NA   Arterial Line Insertion Date: Na      Day #: Na    DVT Prophylaxis: lovenox    GI Prophylaxis: none    Skin Issues/ Wounds and ordered treatment discussed on rounds: N    Goals/ Plans for the Day: add diet and continue to watch bs on the insulin gtt, monitor labs

## 2018-12-21 LAB
ANION GAP SERPL CALCULATED.3IONS-SCNC: 13 MMOL/L (ref 7–16)
ANION GAP SERPL CALCULATED.3IONS-SCNC: 14 MMOL/L (ref 7–16)
BASOPHILS ABSOLUTE: 0.02 E9/L (ref 0–0.2)
BASOPHILS RELATIVE PERCENT: 0.2 % (ref 0–2)
BUN BLDV-MCNC: 4 MG/DL (ref 6–20)
BUN BLDV-MCNC: 5 MG/DL (ref 6–20)
CALCIUM SERPL-MCNC: 8.5 MG/DL (ref 8.6–10.2)
CALCIUM SERPL-MCNC: 8.5 MG/DL (ref 8.6–10.2)
CHLORIDE BLD-SCNC: 101 MMOL/L (ref 98–107)
CHLORIDE BLD-SCNC: 104 MMOL/L (ref 98–107)
CO2: 19 MMOL/L (ref 22–29)
CO2: 20 MMOL/L (ref 22–29)
CREAT SERPL-MCNC: 0.6 MG/DL (ref 0.5–1)
CREAT SERPL-MCNC: 0.6 MG/DL (ref 0.5–1)
EOSINOPHILS ABSOLUTE: 0.03 E9/L (ref 0.05–0.5)
EOSINOPHILS RELATIVE PERCENT: 0.4 % (ref 0–6)
GFR AFRICAN AMERICAN: >60
GFR AFRICAN AMERICAN: >60
GFR NON-AFRICAN AMERICAN: >60 ML/MIN/1.73
GFR NON-AFRICAN AMERICAN: >60 ML/MIN/1.73
GLUCOSE BLD-MCNC: 117 MG/DL (ref 74–99)
GLUCOSE BLD-MCNC: 161 MG/DL (ref 74–99)
HCT VFR BLD CALC: 32.8 % (ref 34–48)
HEMOGLOBIN: 11.1 G/DL (ref 11.5–15.5)
IMMATURE GRANULOCYTES #: 0.02 E9/L
IMMATURE GRANULOCYTES %: 0.2 % (ref 0–5)
LYMPHOCYTES ABSOLUTE: 2.68 E9/L (ref 1.5–4)
LYMPHOCYTES RELATIVE PERCENT: 31.6 % (ref 20–42)
MAGNESIUM: 2.1 MG/DL (ref 1.6–2.6)
MAGNESIUM: 2.1 MG/DL (ref 1.6–2.6)
MCH RBC QN AUTO: 28.9 PG (ref 26–35)
MCHC RBC AUTO-ENTMCNC: 33.8 % (ref 32–34.5)
MCV RBC AUTO: 85.4 FL (ref 80–99.9)
METER GLUCOSE: 105 MG/DL (ref 74–99)
METER GLUCOSE: 107 MG/DL (ref 74–99)
METER GLUCOSE: 109 MG/DL (ref 74–99)
METER GLUCOSE: 126 MG/DL (ref 74–99)
METER GLUCOSE: 131 MG/DL (ref 74–99)
METER GLUCOSE: 131 MG/DL (ref 74–99)
METER GLUCOSE: 138 MG/DL (ref 74–99)
METER GLUCOSE: 160 MG/DL (ref 74–99)
METER GLUCOSE: 181 MG/DL (ref 74–99)
METER GLUCOSE: 208 MG/DL (ref 74–99)
METER GLUCOSE: 302 MG/DL (ref 74–99)
METER GLUCOSE: 87 MG/DL (ref 74–99)
METER GLUCOSE: 89 MG/DL (ref 74–99)
MONOCYTES ABSOLUTE: 0.48 E9/L (ref 0.1–0.95)
MONOCYTES RELATIVE PERCENT: 5.7 % (ref 2–12)
NEUTROPHILS ABSOLUTE: 5.24 E9/L (ref 1.8–7.3)
NEUTROPHILS RELATIVE PERCENT: 61.9 % (ref 43–80)
PDW BLD-RTO: 12.5 FL (ref 11.5–15)
PHOSPHORUS: 3.4 MG/DL (ref 2.5–4.5)
PHOSPHORUS: 3.7 MG/DL (ref 2.5–4.5)
PLATELET # BLD: 239 E9/L (ref 130–450)
PMV BLD AUTO: 11.5 FL (ref 7–12)
POTASSIUM SERPL-SCNC: 3.8 MMOL/L (ref 3.5–5)
POTASSIUM SERPL-SCNC: 4.2 MMOL/L (ref 3.5–5)
RBC # BLD: 3.84 E12/L (ref 3.5–5.5)
SODIUM BLD-SCNC: 134 MMOL/L (ref 132–146)
SODIUM BLD-SCNC: 137 MMOL/L (ref 132–146)
URINE CULTURE, ROUTINE: NORMAL
WBC # BLD: 8.5 E9/L (ref 4.5–11.5)

## 2018-12-21 PROCEDURE — 6370000000 HC RX 637 (ALT 250 FOR IP): Performed by: INTERNAL MEDICINE

## 2018-12-21 PROCEDURE — 36415 COLL VENOUS BLD VENIPUNCTURE: CPT

## 2018-12-21 PROCEDURE — 85025 COMPLETE CBC W/AUTO DIFF WBC: CPT

## 2018-12-21 PROCEDURE — 80048 BASIC METABOLIC PNL TOTAL CA: CPT

## 2018-12-21 PROCEDURE — 1200000000 HC SEMI PRIVATE

## 2018-12-21 PROCEDURE — 6370000000 HC RX 637 (ALT 250 FOR IP): Performed by: OBSTETRICS & GYNECOLOGY

## 2018-12-21 PROCEDURE — 2580000003 HC RX 258: Performed by: INTERNAL MEDICINE

## 2018-12-21 PROCEDURE — 82962 GLUCOSE BLOOD TEST: CPT

## 2018-12-21 PROCEDURE — 83735 ASSAY OF MAGNESIUM: CPT

## 2018-12-21 PROCEDURE — 6360000002 HC RX W HCPCS: Performed by: INTERNAL MEDICINE

## 2018-12-21 PROCEDURE — 84100 ASSAY OF PHOSPHORUS: CPT

## 2018-12-21 PROCEDURE — 6360000002 HC RX W HCPCS: Performed by: EMERGENCY MEDICINE

## 2018-12-21 RX ORDER — DEXTROSE MONOHYDRATE 50 MG/ML
100 INJECTION, SOLUTION INTRAVENOUS PRN
Status: DISCONTINUED | OUTPATIENT
Start: 2018-12-21 | End: 2018-12-22 | Stop reason: HOSPADM

## 2018-12-21 RX ORDER — INSULIN GLARGINE 100 [IU]/ML
20 INJECTION, SOLUTION SUBCUTANEOUS DAILY
Status: DISCONTINUED | OUTPATIENT
Start: 2018-12-21 | End: 2018-12-22 | Stop reason: HOSPADM

## 2018-12-21 RX ORDER — DEXTROSE MONOHYDRATE 25 G/50ML
12.5 INJECTION, SOLUTION INTRAVENOUS PRN
Status: DISCONTINUED | OUTPATIENT
Start: 2018-12-21 | End: 2018-12-22 | Stop reason: HOSPADM

## 2018-12-21 RX ORDER — NICOTINE POLACRILEX 4 MG
15 LOZENGE BUCCAL PRN
Status: DISCONTINUED | OUTPATIENT
Start: 2018-12-21 | End: 2018-12-22 | Stop reason: HOSPADM

## 2018-12-21 RX ADMIN — ACYCLOVIR 200 MG: 200 CAPSULE ORAL at 08:10

## 2018-12-21 RX ADMIN — POTASSIUM CHLORIDE 10 MEQ: 7.46 INJECTION, SOLUTION INTRAVENOUS at 02:08

## 2018-12-21 RX ADMIN — INSULIN LISPRO 4 UNITS: 100 INJECTION, SOLUTION INTRAVENOUS; SUBCUTANEOUS at 20:54

## 2018-12-21 RX ADMIN — ACYCLOVIR 200 MG: 200 CAPSULE ORAL at 11:30

## 2018-12-21 RX ADMIN — DEXTROSE AND SODIUM CHLORIDE: 5; 450 INJECTION, SOLUTION INTRAVENOUS at 05:03

## 2018-12-21 RX ADMIN — ACYCLOVIR 200 MG: 200 CAPSULE ORAL at 23:06

## 2018-12-21 RX ADMIN — INSULIN LISPRO 2 UNITS: 100 INJECTION, SOLUTION INTRAVENOUS; SUBCUTANEOUS at 16:52

## 2018-12-21 RX ADMIN — MORPHINE SULFATE 2 MG: 2 INJECTION, SOLUTION INTRAMUSCULAR; INTRAVENOUS at 15:46

## 2018-12-21 RX ADMIN — MORPHINE SULFATE 2 MG: 2 INJECTION, SOLUTION INTRAMUSCULAR; INTRAVENOUS at 01:01

## 2018-12-21 RX ADMIN — POTASSIUM CHLORIDE 10 MEQ: 7.46 INJECTION, SOLUTION INTRAVENOUS at 07:17

## 2018-12-21 RX ADMIN — ACYCLOVIR 200 MG: 200 CAPSULE ORAL at 15:44

## 2018-12-21 RX ADMIN — POTASSIUM CHLORIDE 10 MEQ: 7.46 INJECTION, SOLUTION INTRAVENOUS at 00:03

## 2018-12-21 RX ADMIN — POTASSIUM CHLORIDE 10 MEQ: 7.46 INJECTION, SOLUTION INTRAVENOUS at 11:28

## 2018-12-21 RX ADMIN — MORPHINE SULFATE 2 MG: 2 INJECTION, SOLUTION INTRAMUSCULAR; INTRAVENOUS at 04:57

## 2018-12-21 RX ADMIN — POTASSIUM CHLORIDE 10 MEQ: 7.46 INJECTION, SOLUTION INTRAVENOUS at 06:13

## 2018-12-21 RX ADMIN — POTASSIUM CHLORIDE 10 MEQ: 7.46 INJECTION, SOLUTION INTRAVENOUS at 01:03

## 2018-12-21 RX ADMIN — POTASSIUM CHLORIDE 10 MEQ: 7.46 INJECTION, SOLUTION INTRAVENOUS at 04:57

## 2018-12-21 RX ADMIN — INSULIN LISPRO 2 UNITS: 100 INJECTION, SOLUTION INTRAVENOUS; SUBCUTANEOUS at 11:49

## 2018-12-21 RX ADMIN — INSULIN GLARGINE 20 UNITS: 100 INJECTION, SOLUTION SUBCUTANEOUS at 10:44

## 2018-12-21 RX ADMIN — ACYCLOVIR 200 MG: 200 CAPSULE ORAL at 18:50

## 2018-12-21 ASSESSMENT — PAIN SCALES - GENERAL
PAINLEVEL_OUTOF10: 0
PAINLEVEL_OUTOF10: 0
PAINLEVEL_OUTOF10: 8
PAINLEVEL_OUTOF10: 0
PAINLEVEL_OUTOF10: 8
PAINLEVEL_OUTOF10: 0
PAINLEVEL_OUTOF10: 0
PAINLEVEL_OUTOF10: 8
PAINLEVEL_OUTOF10: 0

## 2018-12-21 ASSESSMENT — PAIN DESCRIPTION - ORIENTATION: ORIENTATION: LOWER;MID

## 2018-12-21 ASSESSMENT — PAIN DESCRIPTION - ONSET: ONSET: GRADUAL

## 2018-12-21 ASSESSMENT — PAIN DESCRIPTION - LOCATION
LOCATION: FLANK
LOCATION: BACK

## 2018-12-21 ASSESSMENT — PAIN DESCRIPTION - PAIN TYPE
TYPE: CHRONIC PAIN
TYPE: ACUTE PAIN

## 2018-12-21 ASSESSMENT — PAIN DESCRIPTION - FREQUENCY: FREQUENCY: INTERMITTENT

## 2018-12-21 ASSESSMENT — PAIN DESCRIPTION - DESCRIPTORS: DESCRIPTORS: ACHING;DISCOMFORT;DULL

## 2018-12-21 NOTE — PROGRESS NOTES
5' 1\" (1.549 m)   Wt 122 lb 9.2 oz (55.6 kg)   LMP 2018 (Exact Date)   SpO2 95%   BMI 23.16 kg/m²   Tmax over 24 hours:  Temp (24hrs), Av.2 °F (36.8 °C), Min:98 °F (36.7 °C), Max:98.7 °F (37.1 °C)      Patient Vitals for the past 6 hrs:   BP Temp Temp src Pulse Resp SpO2   18 0700 122/86 - - 96 13 95 %   18 0600 125/68 - - 96 19 98 %   18 0500 113/68 - - 95 11 97 %   18 0400 116/75 98.7 °F (37.1 °C) Oral 101 14 99 %   18 0346 116/75 - - 101 18 -   18 0300 116/72 - - 105 15 100 %   18 0200 127/79 - - 102 13 97 %         Intake/Output Summary (Last 24 hours) at 18 0755  Last data filed at 18 0717   Gross per 24 hour   Intake             4863 ml   Output             2500 ml   Net             2363 ml     Wt Readings from Last 2 Encounters:   18 122 lb 9.2 oz (55.6 kg)   18 120 lb (54.4 kg)     Body mass index is 23.16 kg/m². PHYSICAL EXAMINATION:  General:  Awake, alert, oriented X 3. Well developed, well nourished, well groomed. No apparent distress. HEENT:  Normocephalic, atraumatic. Pupils equal, round, reactive to light. No scleral icterus. No conjunctival injection. No nasal discharge. Neck:  Supple, without stridor, no JVD  Heart:  RRR, no murmurs, gallops, rubs, carotid upstroke normal, no carotid bruits  Lungs:  CTA bilaterally, bilat symmetrical expansion, non-labored, no wheeze, rales, or rhonchi  Abdomen:   Bowel sounds present, soft, non-tender, non-distended, no guarding or rigidity  Extremities:  No clubbing, cyanosis  Skin:  Warm and dry  Neuro:  Cranial nerves 2-12 grossly intact, no focal deficits  Breast: deferred  Rectal: deferred  Genitalia:  deferred     MEDICATIONS:    Scheduled Meds:   acyclovir  200 mg Oral 5x Daily    enoxaparin  40 mg Subcutaneous Daily     Continuous Infusions:   insulin (HUMAN R) non-weight based infusion 0.66 Units/hr (18 0700)    dextrose 5 % and 0.45 % NaCl 150 mL/hr at 12/21/18 0503    dextrose 5 % and 0.45 % NaCl       PRN Meds:     dextrose 12.5 g PRN   potassium chloride 10 mEq PRN   magnesium sulfate 1 g PRN   dextrose 5 % and 0.45 % NaCl  Continuous PRN   trimethobenzamide 200 mg Q6H PRN   morphine 2 mg Q4H PRN       VENT SETTINGS (Comprehensive) (if applicable): Additional Respiratory  Assessments  Pulse: 96  Resp: 13  SpO2: 95 %  Oral Care: Mouth swabbed    ABGs:   Recent Labs      12/19/18   0358   PH  7.121*   PCO2  <15.0*   PO2  130.3*   O2SAT  98.0       Laboratory findings:    Complete Blood Count:   Recent Labs      12/19/18   0402  12/20/18   0625  12/21/18   0330   WBC  26.0*  13.5*  8.5   HGB  16.4*  12.1  11.1*   HCT  50.5*  35.2  32.8*   PLT  472*  274  239        Last 3 Blood Glucose:   Recent Labs      12/20/18   1820  12/20/18   2330  12/21/18   0330   GLUCOSE  211*  226*  117*        PT/INR:  No results found for: PROTIME, INR  PTT:  No results found for: APTT, PTT    Comprehensive Metabolic Profile:   Recent Labs      12/19/18   0402   12/20/18   1820  12/20/18   2330  12/21/18   0330   NA  133   < >  136  132  137   K  5.3*   < >  4.4  3.7  3.8   CL  92*   < >  103  98  104   CO2  5*   < >  15*  18*  20*   BUN  17   < >  6  7  5*   CREATININE  0.8   < >  0.6  0.8  0.6   GLUCOSE  594*   < >  211*  226*  117*   CALCIUM  10.1   < >  8.4*  8.5*  8.5*   PROT  9.6*   --    --    --    --    LABALBU  4.8   --    --    --    --    BILITOT  0.2   --    --    --    --    ALKPHOS  159*   --    --    --    --    AST  28   --    --    --    --    ALT  24   --    --    --    --     < > = values in this interval not displayed. Magnesium:   Lab Results   Component Value Date    MG 2.1 12/21/2018     Phosphorus:   Lab Results   Component Value Date    PHOS 3.4 12/21/2018     Ionized Calcium: No results found for: CAION     Urinalysis:     Troponin: No results for input(s): TROPONINI in the last 72 hours.     Microbiology:  Cultures during this admission:     Blood cultures:                 [] None drawn      [x] Negative             []  Positive (Details:  )  Urine Culture:                   [] None drawn      [] Negative             [x]  In process    <10,000 CFU/mL   Gram negative rods   Mixed gram positive organisms       Vaginal cultures pending        ASSESSMENT:     Principal Problem:    DKA (diabetic ketoacidosis) ([facility]  Active Problems:    Diabetes mellitus type 1, uncontrolled ([facility]    Personal history of noncompliance with medical treatment, presenting hazards to health    DKA, type 1, not at goal [facility])  Resolved Problems:    DKA, type 2, not at goal [facility])      Additional assessment:    Genital herpes      PLAN:     WEAN PER PROTOCOL:  [] No   [x] Yes  [] N/A    DISCONTINUE ANY LABS:   [] No   [x] Yes    ICU PROPHYLAXIS:  Stress ulcer:  [] PPI Agent  [] [id number] [] Sucralfate  [x] Other:  VTE:   [x] Enoxaparin  [] Unfract. Heparin Subcut  [] EPC Cuffs    NUTRITION:  [] NPO [] Tube Feeding (Specify: ) [] TPN  [x] PO (Diet: Diet NPO Effective Now)    HOME MEDICATIONS RECONCILED: [] No  [x] Yes    INSULIN DRIP:   [] No   [x] Yes    CONSULTATION NEEDED:  [x] No   [] Yes    FAMILY UPDATED:    [] No   [x] Yes    TRANSFER OUT OF ICU:   [] No   [x] Yes    ADDITIONAL PLAN:  1. Gap closed, resume diet, stop insulin drip  2. GC swabs pending  3. Continue acyclovir for genital herpes, appreciate GYN recommendations  4. Plan to resume subq insulin   5. Will plan to transfer to floor when she is off of the insulin infusion        Adriana Davis DO  Resident, PGY-2  12/21/2018  7:55 AM    I personally saw, examined and provided care for the patient. Radiographs, labs and medication list were reviewed by me independently. I spoke with bedside nursing, therapists and consultants. Critical care services and times documented are independent of procedures and multidisciplinary rounds with Residents.  Additionally comprehensive, multidisciplinary rounds were conducted with the MICU

## 2018-12-21 NOTE — PROGRESS NOTES
Subjective: The patient is awake and alert. No problems overnight. Denies chest pain, angina, and dyspnea. Denies abdominal pain. Tolerating diet. No nausea or vomiting. Objective:    /73   Pulse 109   Temp 98 °F (36.7 °C) (Oral)   Resp 28   Ht 5' 1\" (1.549 m)   Wt 115 lb (52.2 kg)   LMP 11/23/2018 (Exact Date)   SpO2 98%   BMI 21.73 kg/m²     In: -   Out: 900     HEENT: NCAT,  PERRLA, No JVD  Heart:  RRR, no murmurs, gallops, or rubs.   Lungs:  CTA bilaterally, no wheeze, rales or rhonchi  Abd: bowel sounds present, nontender, nondistended, no masses  Extrem:  No clubbing, cyanosis, or edema     Recent Labs      12/19/18   0402  12/20/18   0625   WBC  26.0*  13.5*   HGB  16.4*  12.1   HCT  50.5*  35.2   PLT  472*  274       Recent Labs      12/20/18   0845  12/20/18   1240  12/20/18   1820   NA  137  134  136   K  4.1  4.5  4.4   CL  108*  105  103   CO2  14*  14*  15*   BUN  2*  <2*  6   CREATININE  0.5  0.5  0.6   CALCIUM  8.4*  8.2*  8.4*       Assessment:    Patient Active Problem List   Diagnosis    Diabetes mellitus type 1, uncontrolled ([facility]    Personal history of noncompliance with medical treatment, presenting hazards to health    DKA (diabetic ketoacidosis) ([facility]    DKA, type 1, not at goal [facility])   Genital herpes     Plan:    Admitted for DKA in type 1 diabetic  Uncontrolled blood sugars  Likely from infection - Genital Herpes   Continue Insulin drip with plans ot transition to home regimen   Ok for icu when ok with CC team     Dc plan tomorrow if improved status   DVT and GERD prophylaxis  All consultants notes reviewed    Daysi Enamorado MD  9:51 PM  12/20/2018

## 2018-12-22 VITALS
RESPIRATION RATE: 16 BRPM | HEART RATE: 91 BPM | OXYGEN SATURATION: 100 % | HEIGHT: 61 IN | WEIGHT: 127.7 LBS | BODY MASS INDEX: 24.11 KG/M2 | DIASTOLIC BLOOD PRESSURE: 67 MMHG | SYSTOLIC BLOOD PRESSURE: 113 MMHG | TEMPERATURE: 97.8 F

## 2018-12-22 LAB
ANION GAP SERPL CALCULATED.3IONS-SCNC: 14 MMOL/L (ref 7–16)
BUN BLDV-MCNC: 12 MG/DL (ref 6–20)
CALCIUM SERPL-MCNC: 9.4 MG/DL (ref 8.6–10.2)
CHLORIDE BLD-SCNC: 100 MMOL/L (ref 98–107)
CO2: 22 MMOL/L (ref 22–29)
CREAT SERPL-MCNC: 0.7 MG/DL (ref 0.5–1)
GFR AFRICAN AMERICAN: >60
GFR NON-AFRICAN AMERICAN: >60 ML/MIN/1.73
GLUCOSE BLD-MCNC: 226 MG/DL (ref 74–99)
MAGNESIUM: 1.6 MG/DL (ref 1.6–2.6)
METER GLUCOSE: 165 MG/DL (ref 74–99)
METER GLUCOSE: 194 MG/DL (ref 74–99)
METER GLUCOSE: 214 MG/DL (ref 74–99)
PHOSPHORUS: 4.8 MG/DL (ref 2.5–4.5)
POTASSIUM SERPL-SCNC: 4 MMOL/L (ref 3.5–5)
SODIUM BLD-SCNC: 136 MMOL/L (ref 132–146)

## 2018-12-22 PROCEDURE — 36415 COLL VENOUS BLD VENIPUNCTURE: CPT

## 2018-12-22 PROCEDURE — 82962 GLUCOSE BLOOD TEST: CPT

## 2018-12-22 PROCEDURE — 6370000000 HC RX 637 (ALT 250 FOR IP): Performed by: INTERNAL MEDICINE

## 2018-12-22 PROCEDURE — 6370000000 HC RX 637 (ALT 250 FOR IP): Performed by: OBSTETRICS & GYNECOLOGY

## 2018-12-22 PROCEDURE — 6360000002 HC RX W HCPCS: Performed by: EMERGENCY MEDICINE

## 2018-12-22 PROCEDURE — 84100 ASSAY OF PHOSPHORUS: CPT

## 2018-12-22 PROCEDURE — 80048 BASIC METABOLIC PNL TOTAL CA: CPT

## 2018-12-22 PROCEDURE — 83735 ASSAY OF MAGNESIUM: CPT

## 2018-12-22 RX ORDER — IBUPROFEN 600 MG/1
600 TABLET ORAL EVERY 6 HOURS PRN
Status: DISCONTINUED | OUTPATIENT
Start: 2018-12-22 | End: 2018-12-22 | Stop reason: HOSPADM

## 2018-12-22 RX ORDER — ACYCLOVIR 200 MG/1
200 CAPSULE ORAL
Qty: 50 CAPSULE | Refills: 0 | Status: SHIPPED | OUTPATIENT
Start: 2018-12-22 | End: 2019-01-01

## 2018-12-22 RX ADMIN — ACYCLOVIR 200 MG: 200 CAPSULE ORAL at 06:42

## 2018-12-22 RX ADMIN — INSULIN LISPRO 2 UNITS: 100 INJECTION, SOLUTION INTRAVENOUS; SUBCUTANEOUS at 16:06

## 2018-12-22 RX ADMIN — MORPHINE SULFATE 2 MG: 2 INJECTION, SOLUTION INTRAMUSCULAR; INTRAVENOUS at 13:46

## 2018-12-22 RX ADMIN — INSULIN LISPRO 2 UNITS: 100 INJECTION, SOLUTION INTRAVENOUS; SUBCUTANEOUS at 11:43

## 2018-12-22 RX ADMIN — INSULIN GLARGINE 20 UNITS: 100 INJECTION, SOLUTION SUBCUTANEOUS at 07:58

## 2018-12-22 RX ADMIN — MORPHINE SULFATE 2 MG: 2 INJECTION, SOLUTION INTRAMUSCULAR; INTRAVENOUS at 00:10

## 2018-12-22 RX ADMIN — MORPHINE SULFATE 2 MG: 2 INJECTION, SOLUTION INTRAMUSCULAR; INTRAVENOUS at 08:54

## 2018-12-22 RX ADMIN — INSULIN LISPRO 4 UNITS: 100 INJECTION, SOLUTION INTRAVENOUS; SUBCUTANEOUS at 06:44

## 2018-12-22 RX ADMIN — ACYCLOVIR 200 MG: 200 CAPSULE ORAL at 16:06

## 2018-12-22 RX ADMIN — ACYCLOVIR 200 MG: 200 CAPSULE ORAL at 11:42

## 2018-12-22 ASSESSMENT — PAIN DESCRIPTION - DESCRIPTORS: DESCRIPTORS: ACHING;DISCOMFORT;DULL

## 2018-12-22 ASSESSMENT — PAIN SCALES - GENERAL
PAINLEVEL_OUTOF10: 8
PAINLEVEL_OUTOF10: 9
PAINLEVEL_OUTOF10: 0
PAINLEVEL_OUTOF10: 8

## 2018-12-22 ASSESSMENT — PAIN DESCRIPTION - FREQUENCY: FREQUENCY: INTERMITTENT

## 2018-12-22 ASSESSMENT — PAIN DESCRIPTION - ORIENTATION: ORIENTATION: LOWER;MID

## 2018-12-22 ASSESSMENT — PAIN DESCRIPTION - PROGRESSION: CLINICAL_PROGRESSION: GRADUALLY WORSENING

## 2018-12-22 ASSESSMENT — PAIN DESCRIPTION - LOCATION: LOCATION: BACK

## 2018-12-22 ASSESSMENT — PAIN DESCRIPTION - ONSET: ONSET: GRADUAL

## 2018-12-22 ASSESSMENT — PAIN DESCRIPTION - PAIN TYPE: TYPE: ACUTE PAIN

## 2018-12-22 NOTE — PATIENT CARE CONFERENCE
[facility] Quality Flow/Interdisciplinary Rounds Progress Note        Quality Flow Rounds held on December 22, 2018    Disciplines Attending:  Bedside Nurse, ,  and Nursing Unit Leadership    Emeline Babinski was admitted on 12/19/2018  3:24 AM    Anticipated Discharge Date:  Expected Discharge Date: 12/20/18    Disposition:    Gavino Score:  Gavino Scale Score: 22    Readmission Risk              Risk of Unplanned Readmission:        19           Discussed patient goal for the day, patient clinical progression, and barriers to discharge. The following Goal(s) of the Day/Commitment(s) have been identified:  Discharge planning.        Queenie Nielson  December 22, 2018

## 2018-12-22 NOTE — DISCHARGE SUMMARY
these medications       ibuprofen (ADVIL;MOTRIN) 800 MG tablet Comments:   Reason for Stopping:             Activity: activity as tolerated  Diet: diabetic diet    Follow up with dr Melisa Gomez in 1 week.     Note that over 30 minutes was spent in preparing discharge papers, discussing discharge with patient, medication review, etc.    Signed:  Marycarmen Zamora MD  12/22/2018  2:55 PM

## 2018-12-24 LAB
BLOOD CULTURE, ROUTINE: NORMAL
CULTURE, BLOOD 2: NORMAL

## 2018-12-25 LAB
6AM URINE: <10 NG/ML
CODEINE, URINE: <20 NG/ML
HERPES SIMPLEX VIRUS BY PCR: NOT DETECTED
HSV SOURCE: NORMAL
HYDROCODONE, URINE: <20 NG/ML
HYDROMORPHONE, URINE: <20 NG/ML
MORPHINE URINE: 174 NG/ML
NORHYDROCODONE, URINE: <20 NG/ML
NOROXYCODONE, URINE: <20 NG/ML
NOROXYMORPHONE, URINE: <20 NG/ML
OXYCODONE, URINE CONFIRMATION: <20 NG/ML
OXYMORPHONE, URINE: <20 NG/ML

## 2019-02-09 ENCOUNTER — HOSPITAL ENCOUNTER (INPATIENT)
Age: 24
LOS: 1 days | Discharge: HOME | DRG: 420 | End: 2019-02-11
Attending: EMERGENCY MEDICINE | Admitting: INTERNAL MEDICINE
Payer: COMMERCIAL

## 2019-02-09 DIAGNOSIS — E10.10 DIABETIC KETOACIDOSIS WITHOUT COMA ASSOCIATED WITH TYPE 1 DIABETES MELLITUS (HCC): Primary | ICD-10-CM

## 2019-02-09 PROCEDURE — 99291 CRITICAL CARE FIRST HOUR: CPT

## 2019-02-09 RX ORDER — 0.9 % SODIUM CHLORIDE 0.9 %
1000 INTRAVENOUS SOLUTION INTRAVENOUS ONCE
Status: COMPLETED | OUTPATIENT
Start: 2019-02-10 | End: 2019-02-10

## 2019-02-09 RX ORDER — ONDANSETRON 2 MG/ML
4 INJECTION INTRAMUSCULAR; INTRAVENOUS ONCE
Status: COMPLETED | OUTPATIENT
Start: 2019-02-10 | End: 2019-02-10

## 2019-02-09 ASSESSMENT — PAIN SCALES - GENERAL: PAINLEVEL_OUTOF10: 6

## 2019-02-09 ASSESSMENT — PAIN DESCRIPTION - DESCRIPTORS: DESCRIPTORS: ACHING

## 2019-02-09 ASSESSMENT — PAIN DESCRIPTION - ORIENTATION: ORIENTATION: LOWER

## 2019-02-09 ASSESSMENT — PAIN DESCRIPTION - PAIN TYPE: TYPE: ACUTE PAIN

## 2019-02-09 ASSESSMENT — PAIN DESCRIPTION - LOCATION: LOCATION: BACK

## 2019-02-09 ASSESSMENT — PAIN DESCRIPTION - FREQUENCY: FREQUENCY: CONTINUOUS

## 2019-02-10 ENCOUNTER — APPOINTMENT (OUTPATIENT)
Dept: CT IMAGING | Age: 24
DRG: 420 | End: 2019-02-10
Payer: COMMERCIAL

## 2019-02-10 ENCOUNTER — APPOINTMENT (OUTPATIENT)
Dept: GENERAL RADIOLOGY | Age: 24
DRG: 420 | End: 2019-02-10
Payer: COMMERCIAL

## 2019-02-10 LAB
ALBUMIN SERPL-MCNC: 4.6 G/DL (ref 3.5–5.2)
ALP BLD-CCNC: 112 U/L (ref 35–104)
ALT SERPL-CCNC: 23 U/L (ref 0–32)
AMYLASE: 25 U/L (ref 20–100)
ANION GAP SERPL CALCULATED.3IONS-SCNC: 15 MMOL/L (ref 7–16)
ANION GAP SERPL CALCULATED.3IONS-SCNC: 16 MMOL/L (ref 7–16)
ANION GAP SERPL CALCULATED.3IONS-SCNC: 17 MMOL/L (ref 7–16)
ANION GAP SERPL CALCULATED.3IONS-SCNC: 28 MMOL/L (ref 7–16)
ANION GAP SERPL CALCULATED.3IONS-SCNC: 35 MMOL/L (ref 7–16)
APTT: 35.3 SEC (ref 24.5–35.1)
AST SERPL-CCNC: 27 U/L (ref 0–31)
BACTERIA: ABNORMAL /HPF
BASOPHILS ABSOLUTE: 0.15 E9/L (ref 0–0.2)
BASOPHILS RELATIVE PERCENT: 0.5 % (ref 0–2)
BETA-HYDROXYBUTYRATE: >4.5 MMOL/L (ref 0.02–0.27)
BILIRUB SERPL-MCNC: <0.2 MG/DL (ref 0–1.2)
BILIRUBIN URINE: NEGATIVE
BLOOD, URINE: ABNORMAL
BUN BLDV-MCNC: 12 MG/DL (ref 6–20)
BUN BLDV-MCNC: 15 MG/DL (ref 6–20)
BUN BLDV-MCNC: 5 MG/DL (ref 6–20)
BUN BLDV-MCNC: 6 MG/DL (ref 6–20)
BUN BLDV-MCNC: 9 MG/DL (ref 6–20)
CALCIUM SERPL-MCNC: 7.7 MG/DL (ref 8.6–10.2)
CALCIUM SERPL-MCNC: 7.9 MG/DL (ref 8.6–10.2)
CALCIUM SERPL-MCNC: 8.1 MG/DL (ref 8.6–10.2)
CALCIUM SERPL-MCNC: 8.2 MG/DL (ref 8.6–10.2)
CALCIUM SERPL-MCNC: 9.6 MG/DL (ref 8.6–10.2)
CHLORIDE BLD-SCNC: 104 MMOL/L (ref 98–107)
CHLORIDE BLD-SCNC: 108 MMOL/L (ref 98–107)
CHLORIDE BLD-SCNC: 109 MMOL/L (ref 98–107)
CHLORIDE BLD-SCNC: 110 MMOL/L (ref 98–107)
CHLORIDE BLD-SCNC: 95 MMOL/L (ref 98–107)
CLARITY: CLEAR
CO2: 10 MMOL/L (ref 22–29)
CO2: 10 MMOL/L (ref 22–29)
CO2: 3 MMOL/L (ref 22–29)
CO2: 3 MMOL/L (ref 22–29)
CO2: 9 MMOL/L (ref 22–29)
COLOR: YELLOW
CREAT SERPL-MCNC: 0.5 MG/DL (ref 0.5–1)
CREAT SERPL-MCNC: 0.6 MG/DL (ref 0.5–1)
CREAT SERPL-MCNC: 0.7 MG/DL (ref 0.5–1)
EKG ATRIAL RATE: 132 BPM
EKG P AXIS: 81 DEGREES
EKG P-R INTERVAL: 124 MS
EKG Q-T INTERVAL: 298 MS
EKG QRS DURATION: 62 MS
EKG QTC CALCULATION (BAZETT): 441 MS
EKG R AXIS: 72 DEGREES
EKG T AXIS: 51 DEGREES
EKG VENTRICULAR RATE: 132 BPM
EOSINOPHILS ABSOLUTE: 0.02 E9/L (ref 0.05–0.5)
EOSINOPHILS RELATIVE PERCENT: 0.1 % (ref 0–6)
EPITHELIAL CELLS, UA: ABNORMAL /HPF
GFR AFRICAN AMERICAN: >60
GFR NON-AFRICAN AMERICAN: >60 ML/MIN/1.73
GLUCOSE BLD-MCNC: 122 MG/DL (ref 74–99)
GLUCOSE BLD-MCNC: 137 MG/DL (ref 74–99)
GLUCOSE BLD-MCNC: 267 MG/DL (ref 74–99)
GLUCOSE BLD-MCNC: 275 MG/DL (ref 74–99)
GLUCOSE BLD-MCNC: 388 MG/DL (ref 74–99)
GLUCOSE BLD-MCNC: 643 MG/DL (ref 74–99)
GLUCOSE BLD-MCNC: 698 MG/DL (ref 74–99)
GLUCOSE URINE: 500 MG/DL
HCG, URINE, POC: NEGATIVE
HCT VFR BLD CALC: 45.8 % (ref 34–48)
HEMOGLOBIN: 14.7 G/DL (ref 11.5–15.5)
IMMATURE GRANULOCYTES #: 0.47 E9/L
IMMATURE GRANULOCYTES %: 1.7 % (ref 0–5)
INR BLD: 1
KETONES, URINE: >=80 MG/DL
LACTIC ACID, SEPSIS: 2.5 MMOL/L (ref 0.5–1.9)
LACTIC ACID: 1.9 MMOL/L (ref 0.5–2.2)
LEUKOCYTE ESTERASE, URINE: ABNORMAL
LIPASE: 9 U/L (ref 13–60)
LYMPHOCYTES ABSOLUTE: 6.18 E9/L (ref 1.5–4)
LYMPHOCYTES RELATIVE PERCENT: 22.4 % (ref 20–42)
Lab: NORMAL
MAGNESIUM: 1.6 MG/DL (ref 1.6–2.6)
MAGNESIUM: 1.9 MG/DL (ref 1.6–2.6)
MAGNESIUM: 2.1 MG/DL (ref 1.6–2.6)
MAGNESIUM: 2.2 MG/DL (ref 1.6–2.6)
MAGNESIUM: 2.4 MG/DL (ref 1.6–2.6)
MCH RBC QN AUTO: 29.1 PG (ref 26–35)
MCHC RBC AUTO-ENTMCNC: 32.1 % (ref 32–34.5)
MCV RBC AUTO: 90.5 FL (ref 80–99.9)
METER GLUCOSE: 102 MG/DL (ref 74–99)
METER GLUCOSE: 105 MG/DL (ref 74–99)
METER GLUCOSE: 112 MG/DL (ref 74–99)
METER GLUCOSE: 116 MG/DL (ref 74–99)
METER GLUCOSE: 118 MG/DL (ref 74–99)
METER GLUCOSE: 138 MG/DL (ref 74–99)
METER GLUCOSE: 144 MG/DL (ref 74–99)
METER GLUCOSE: 158 MG/DL (ref 74–99)
METER GLUCOSE: 170 MG/DL (ref 74–99)
METER GLUCOSE: 171 MG/DL (ref 74–99)
METER GLUCOSE: 220 MG/DL (ref 74–99)
METER GLUCOSE: 232 MG/DL (ref 74–99)
METER GLUCOSE: 248 MG/DL (ref 74–99)
METER GLUCOSE: 250 MG/DL (ref 74–99)
METER GLUCOSE: 280 MG/DL (ref 74–99)
METER GLUCOSE: 284 MG/DL (ref 74–99)
METER GLUCOSE: 380 MG/DL (ref 74–99)
METER GLUCOSE: 380 MG/DL (ref 74–99)
METER GLUCOSE: >500 MG/DL (ref 74–99)
MONOCYTES ABSOLUTE: 1.02 E9/L (ref 0.1–0.95)
MONOCYTES RELATIVE PERCENT: 3.7 % (ref 2–12)
NEGATIVE QC PASS/FAIL: NORMAL
NEUTROPHILS ABSOLUTE: 19.69 E9/L (ref 1.8–7.3)
NEUTROPHILS RELATIVE PERCENT: 71.6 % (ref 43–80)
NITRITE, URINE: NEGATIVE
PDW BLD-RTO: 12.8 FL (ref 11.5–15)
PH UA: 5.5 (ref 5–9)
PH VENOUS: 7.03 (ref 7.3–7.42)
PHOSPHORUS: 1.8 MG/DL (ref 2.5–4.5)
PHOSPHORUS: 2.1 MG/DL (ref 2.5–4.5)
PHOSPHORUS: 3.3 MG/DL (ref 2.5–4.5)
PHOSPHORUS: 4.2 MG/DL (ref 2.5–4.5)
PLATELET # BLD: 431 E9/L (ref 130–450)
PMV BLD AUTO: 11.6 FL (ref 7–12)
POSITIVE QC PASS/FAIL: NORMAL
POTASSIUM REFLEX MAGNESIUM: 5.3 MMOL/L (ref 3.5–5)
POTASSIUM SERPL-SCNC: 4 MMOL/L (ref 3.5–5)
POTASSIUM SERPL-SCNC: 4.5 MMOL/L (ref 3.5–5)
POTASSIUM SERPL-SCNC: 4.8 MMOL/L (ref 3.5–5)
POTASSIUM SERPL-SCNC: 5.1 MMOL/L (ref 3.5–5)
PROTEIN UA: NEGATIVE MG/DL
PROTHROMBIN TIME: 11.6 SEC (ref 9.3–12.4)
RBC # BLD: 5.06 E12/L (ref 3.5–5.5)
RBC # BLD: NORMAL 10*6/UL
RBC UA: ABNORMAL /HPF (ref 0–2)
SODIUM BLD-SCNC: 131 MMOL/L (ref 132–146)
SODIUM BLD-SCNC: 133 MMOL/L (ref 132–146)
SODIUM BLD-SCNC: 133 MMOL/L (ref 132–146)
SODIUM BLD-SCNC: 134 MMOL/L (ref 132–146)
SODIUM BLD-SCNC: 141 MMOL/L (ref 132–146)
SPECIFIC GRAVITY UA: 1.01 (ref 1–1.03)
TOTAL PROTEIN: 8.4 G/DL (ref 6.4–8.3)
UROBILINOGEN, URINE: 0.2 E.U./DL
WBC # BLD: 27.5 E9/L (ref 4.5–11.5)
WBC UA: ABNORMAL /HPF (ref 0–5)

## 2019-02-10 PROCEDURE — 6360000002 HC RX W HCPCS: Performed by: INTERNAL MEDICINE

## 2019-02-10 PROCEDURE — 6360000002 HC RX W HCPCS: Performed by: STUDENT IN AN ORGANIZED HEALTH CARE EDUCATION/TRAINING PROGRAM

## 2019-02-10 PROCEDURE — 80048 BASIC METABOLIC PNL TOTAL CA: CPT

## 2019-02-10 PROCEDURE — 83735 ASSAY OF MAGNESIUM: CPT

## 2019-02-10 PROCEDURE — 6360000002 HC RX W HCPCS: Performed by: EMERGENCY MEDICINE

## 2019-02-10 PROCEDURE — 80053 COMPREHEN METABOLIC PANEL: CPT

## 2019-02-10 PROCEDURE — 96374 THER/PROPH/DIAG INJ IV PUSH: CPT

## 2019-02-10 PROCEDURE — 85730 THROMBOPLASTIN TIME PARTIAL: CPT

## 2019-02-10 PROCEDURE — 36592 COLLECT BLOOD FROM PICC: CPT

## 2019-02-10 PROCEDURE — 36415 COLL VENOUS BLD VENIPUNCTURE: CPT

## 2019-02-10 PROCEDURE — 82962 GLUCOSE BLOOD TEST: CPT

## 2019-02-10 PROCEDURE — 81001 URINALYSIS AUTO W/SCOPE: CPT

## 2019-02-10 PROCEDURE — 82800 BLOOD PH: CPT

## 2019-02-10 PROCEDURE — 87040 BLOOD CULTURE FOR BACTERIA: CPT

## 2019-02-10 PROCEDURE — 83690 ASSAY OF LIPASE: CPT

## 2019-02-10 PROCEDURE — 82947 ASSAY GLUCOSE BLOOD QUANT: CPT

## 2019-02-10 PROCEDURE — 2580000003 HC RX 258: Performed by: EMERGENCY MEDICINE

## 2019-02-10 PROCEDURE — 2500000003 HC RX 250 WO HCPCS: Performed by: EMERGENCY MEDICINE

## 2019-02-10 PROCEDURE — 2580000003 HC RX 258: Performed by: INTERNAL MEDICINE

## 2019-02-10 PROCEDURE — 87081 CULTURE SCREEN ONLY: CPT

## 2019-02-10 PROCEDURE — 74177 CT ABD & PELVIS W/CONTRAST: CPT

## 2019-02-10 PROCEDURE — 2000000000 HC ICU R&B

## 2019-02-10 PROCEDURE — 85025 COMPLETE CBC W/AUTO DIFF WBC: CPT

## 2019-02-10 PROCEDURE — 82010 KETONE BODYS QUAN: CPT

## 2019-02-10 PROCEDURE — 93005 ELECTROCARDIOGRAM TRACING: CPT | Performed by: EMERGENCY MEDICINE

## 2019-02-10 PROCEDURE — 85610 PROTHROMBIN TIME: CPT

## 2019-02-10 PROCEDURE — 84100 ASSAY OF PHOSPHORUS: CPT

## 2019-02-10 PROCEDURE — 2580000003 HC RX 258: Performed by: RADIOLOGY

## 2019-02-10 PROCEDURE — 96376 TX/PRO/DX INJ SAME DRUG ADON: CPT

## 2019-02-10 PROCEDURE — 87149 DNA/RNA DIRECT PROBE: CPT

## 2019-02-10 PROCEDURE — 96375 TX/PRO/DX INJ NEW DRUG ADDON: CPT

## 2019-02-10 PROCEDURE — 71045 X-RAY EXAM CHEST 1 VIEW: CPT

## 2019-02-10 PROCEDURE — 6360000004 HC RX CONTRAST MEDICATION: Performed by: RADIOLOGY

## 2019-02-10 PROCEDURE — 82150 ASSAY OF AMYLASE: CPT

## 2019-02-10 PROCEDURE — 6370000000 HC RX 637 (ALT 250 FOR IP): Performed by: EMERGENCY MEDICINE

## 2019-02-10 PROCEDURE — 83605 ASSAY OF LACTIC ACID: CPT

## 2019-02-10 RX ORDER — DEXTROSE AND SODIUM CHLORIDE 5; .45 G/100ML; G/100ML
INJECTION, SOLUTION INTRAVENOUS CONTINUOUS PRN
Status: DISCONTINUED | OUTPATIENT
Start: 2019-02-10 | End: 2019-02-11

## 2019-02-10 RX ORDER — DEXTROSE MONOHYDRATE 25 G/50ML
12.5 INJECTION, SOLUTION INTRAVENOUS PRN
Status: DISCONTINUED | OUTPATIENT
Start: 2019-02-10 | End: 2019-02-11

## 2019-02-10 RX ORDER — SODIUM CHLORIDE 0.9 % (FLUSH) 0.9 %
10 SYRINGE (ML) INJECTION PRN
Status: DISCONTINUED | OUTPATIENT
Start: 2019-02-10 | End: 2019-02-11 | Stop reason: HOSPADM

## 2019-02-10 RX ORDER — ACETAMINOPHEN 325 MG/1
650 TABLET ORAL EVERY 4 HOURS PRN
Status: DISCONTINUED | OUTPATIENT
Start: 2019-02-10 | End: 2019-02-11 | Stop reason: HOSPADM

## 2019-02-10 RX ORDER — POTASSIUM CHLORIDE 7.45 MG/ML
10 INJECTION INTRAVENOUS PRN
Status: DISCONTINUED | OUTPATIENT
Start: 2019-02-10 | End: 2019-02-11

## 2019-02-10 RX ORDER — SODIUM CHLORIDE 0.9 % (FLUSH) 0.9 %
10 SYRINGE (ML) INJECTION 2 TIMES DAILY
Status: DISCONTINUED | OUTPATIENT
Start: 2019-02-10 | End: 2019-02-11

## 2019-02-10 RX ORDER — ONDANSETRON 2 MG/ML
4 INJECTION INTRAMUSCULAR; INTRAVENOUS EVERY 8 HOURS PRN
Status: DISCONTINUED | OUTPATIENT
Start: 2019-02-10 | End: 2019-02-11 | Stop reason: HOSPADM

## 2019-02-10 RX ORDER — MORPHINE SULFATE 2 MG/ML
2 INJECTION, SOLUTION INTRAMUSCULAR; INTRAVENOUS ONCE
Status: COMPLETED | OUTPATIENT
Start: 2019-02-10 | End: 2019-02-10

## 2019-02-10 RX ORDER — MAGNESIUM SULFATE 1 G/100ML
1 INJECTION INTRAVENOUS PRN
Status: DISCONTINUED | OUTPATIENT
Start: 2019-02-10 | End: 2019-02-11

## 2019-02-10 RX ORDER — 0.9 % SODIUM CHLORIDE 0.9 %
1000 INTRAVENOUS SOLUTION INTRAVENOUS ONCE
Status: COMPLETED | OUTPATIENT
Start: 2019-02-10 | End: 2019-02-10

## 2019-02-10 RX ORDER — MORPHINE SULFATE 2 MG/ML
2 INJECTION, SOLUTION INTRAMUSCULAR; INTRAVENOUS
Status: DISCONTINUED | OUTPATIENT
Start: 2019-02-10 | End: 2019-02-11

## 2019-02-10 RX ORDER — SODIUM CHLORIDE 0.9 % (FLUSH) 0.9 %
10 SYRINGE (ML) INJECTION EVERY 12 HOURS SCHEDULED
Status: DISCONTINUED | OUTPATIENT
Start: 2019-02-10 | End: 2019-02-11 | Stop reason: HOSPADM

## 2019-02-10 RX ORDER — 0.9 % SODIUM CHLORIDE 0.9 %
15 INTRAVENOUS SOLUTION INTRAVENOUS ONCE
Status: COMPLETED | OUTPATIENT
Start: 2019-02-10 | End: 2019-02-10

## 2019-02-10 RX ORDER — SODIUM CHLORIDE 9 MG/ML
INJECTION, SOLUTION INTRAVENOUS CONTINUOUS
Status: DISCONTINUED | OUTPATIENT
Start: 2019-02-10 | End: 2019-02-11

## 2019-02-10 RX ORDER — MORPHINE SULFATE 2 MG/ML
2 INJECTION, SOLUTION INTRAMUSCULAR; INTRAVENOUS
Status: DISCONTINUED | OUTPATIENT
Start: 2019-02-10 | End: 2019-02-10

## 2019-02-10 RX ADMIN — POTASSIUM CHLORIDE 10 MEQ: 10 INJECTION, SOLUTION INTRAVENOUS at 16:06

## 2019-02-10 RX ADMIN — MORPHINE SULFATE 2 MG: 2 INJECTION, SOLUTION INTRAMUSCULAR; INTRAVENOUS at 03:10

## 2019-02-10 RX ADMIN — MORPHINE SULFATE 2 MG: 2 INJECTION, SOLUTION INTRAMUSCULAR; INTRAVENOUS at 16:06

## 2019-02-10 RX ADMIN — Medication 10 ML: at 20:55

## 2019-02-10 RX ADMIN — SODIUM CHLORIDE 0.1 UNITS/KG/HR: 9 INJECTION, SOLUTION INTRAVENOUS at 04:25

## 2019-02-10 RX ADMIN — SODIUM CHLORIDE 1000 ML: 4.5 INJECTION, SOLUTION INTRAVENOUS at 10:36

## 2019-02-10 RX ADMIN — ONDANSETRON 4 MG: 2 INJECTION INTRAMUSCULAR; INTRAVENOUS at 02:34

## 2019-02-10 RX ADMIN — MORPHINE SULFATE 2 MG: 2 INJECTION, SOLUTION INTRAMUSCULAR; INTRAVENOUS at 11:03

## 2019-02-10 RX ADMIN — MORPHINE SULFATE 2 MG: 2 INJECTION, SOLUTION INTRAMUSCULAR; INTRAVENOUS at 22:07

## 2019-02-10 RX ADMIN — SODIUM CHLORIDE 783 ML: 9 INJECTION, SOLUTION INTRAVENOUS at 05:01

## 2019-02-10 RX ADMIN — SODIUM CHLORIDE 1000 ML: 4.5 INJECTION, SOLUTION INTRAVENOUS at 11:56

## 2019-02-10 RX ADMIN — MAGNESIUM SULFATE HEPTAHYDRATE 1 G: 1 INJECTION, SOLUTION INTRAVENOUS at 14:24

## 2019-02-10 RX ADMIN — SODIUM CHLORIDE 1000 ML: 9 INJECTION, SOLUTION INTRAVENOUS at 04:24

## 2019-02-10 RX ADMIN — MORPHINE SULFATE 2 MG: 2 INJECTION, SOLUTION INTRAMUSCULAR; INTRAVENOUS at 06:50

## 2019-02-10 RX ADMIN — Medication 10 ML: at 03:32

## 2019-02-10 RX ADMIN — POTASSIUM CHLORIDE 10 MEQ: 10 INJECTION, SOLUTION INTRAVENOUS at 08:58

## 2019-02-10 RX ADMIN — POTASSIUM CHLORIDE 10 MEQ: 10 INJECTION, SOLUTION INTRAVENOUS at 16:56

## 2019-02-10 RX ADMIN — SODIUM CHLORIDE 1000 ML: 4.5 INJECTION, SOLUTION INTRAVENOUS at 14:30

## 2019-02-10 RX ADMIN — POTASSIUM CHLORIDE 10 MEQ: 10 INJECTION, SOLUTION INTRAVENOUS at 15:00

## 2019-02-10 RX ADMIN — POTASSIUM CHLORIDE 10 MEQ: 10 INJECTION, SOLUTION INTRAVENOUS at 21:28

## 2019-02-10 RX ADMIN — MORPHINE SULFATE 2 MG: 2 INJECTION, SOLUTION INTRAMUSCULAR; INTRAVENOUS at 02:34

## 2019-02-10 RX ADMIN — POTASSIUM CHLORIDE 10 MEQ: 10 INJECTION, SOLUTION INTRAVENOUS at 20:55

## 2019-02-10 RX ADMIN — DEXTROSE AND SODIUM CHLORIDE: 5; 450 INJECTION, SOLUTION INTRAVENOUS at 08:08

## 2019-02-10 RX ADMIN — POTASSIUM CHLORIDE 10 MEQ: 10 INJECTION, SOLUTION INTRAVENOUS at 14:24

## 2019-02-10 RX ADMIN — MORPHINE SULFATE 2 MG: 2 INJECTION, SOLUTION INTRAMUSCULAR; INTRAVENOUS at 08:59

## 2019-02-10 RX ADMIN — POTASSIUM CHLORIDE 10 MEQ: 10 INJECTION, SOLUTION INTRAVENOUS at 15:40

## 2019-02-10 RX ADMIN — DEXTROSE AND SODIUM CHLORIDE: 5; 450 INJECTION, SOLUTION INTRAVENOUS at 20:56

## 2019-02-10 RX ADMIN — SODIUM CHLORIDE 1000 ML: 4.5 INJECTION, SOLUTION INTRAVENOUS at 09:33

## 2019-02-10 RX ADMIN — SODIUM CHLORIDE 1000 ML: 9 INJECTION, SOLUTION INTRAVENOUS at 02:34

## 2019-02-10 RX ADMIN — SODIUM PHOSPHATE, MONOBASIC, MONOHYDRATE 15 MMOL: 276; 142 INJECTION, SOLUTION INTRAVENOUS at 15:58

## 2019-02-10 RX ADMIN — MORPHINE SULFATE 2 MG: 2 INJECTION, SOLUTION INTRAMUSCULAR; INTRAVENOUS at 04:28

## 2019-02-10 RX ADMIN — SODIUM CHLORIDE: 9 INJECTION, SOLUTION INTRAVENOUS at 06:09

## 2019-02-10 RX ADMIN — MAGNESIUM SULFATE HEPTAHYDRATE 1 G: 1 INJECTION, SOLUTION INTRAVENOUS at 15:19

## 2019-02-10 RX ADMIN — IOPAMIDOL 110 ML: 755 INJECTION, SOLUTION INTRAVENOUS at 03:35

## 2019-02-10 RX ADMIN — MORPHINE SULFATE 2 MG: 2 INJECTION, SOLUTION INTRAMUSCULAR; INTRAVENOUS at 19:42

## 2019-02-10 RX ADMIN — SODIUM CHLORIDE 1000 ML: 4.5 INJECTION, SOLUTION INTRAVENOUS at 15:44

## 2019-02-10 RX ADMIN — Medication 10 ML: at 09:00

## 2019-02-10 RX ADMIN — POTASSIUM CHLORIDE 10 MEQ: 10 INJECTION, SOLUTION INTRAVENOUS at 09:30

## 2019-02-10 RX ADMIN — ONDANSETRON 4 MG: 2 INJECTION INTRAMUSCULAR; INTRAVENOUS at 19:42

## 2019-02-10 ASSESSMENT — PAIN DESCRIPTION - DESCRIPTORS
DESCRIPTORS: ACHING;DISCOMFORT;DULL
DESCRIPTORS: ACHING;DISCOMFORT;DULL
DESCRIPTORS: SHARP
DESCRIPTORS: ACHING;DISCOMFORT;SQUEEZING
DESCRIPTORS: ACHING;DISCOMFORT
DESCRIPTORS: SHARP
DESCRIPTORS: ACHING;DISCOMFORT

## 2019-02-10 ASSESSMENT — PAIN DESCRIPTION - ORIENTATION
ORIENTATION: RIGHT;LEFT;LOWER
ORIENTATION: RIGHT;LEFT
ORIENTATION: RIGHT;LEFT
ORIENTATION: RIGHT;LEFT;LOWER
ORIENTATION: RIGHT;LEFT;LOWER
ORIENTATION: LOWER
ORIENTATION: LOWER

## 2019-02-10 ASSESSMENT — PAIN SCALES - GENERAL
PAINLEVEL_OUTOF10: 10
PAINLEVEL_OUTOF10: 0
PAINLEVEL_OUTOF10: 5
PAINLEVEL_OUTOF10: 8
PAINLEVEL_OUTOF10: 10
PAINLEVEL_OUTOF10: 10
PAINLEVEL_OUTOF10: 0
PAINLEVEL_OUTOF10: 8
PAINLEVEL_OUTOF10: 0
PAINLEVEL_OUTOF10: 10
PAINLEVEL_OUTOF10: 8
PAINLEVEL_OUTOF10: 7
PAINLEVEL_OUTOF10: 0
PAINLEVEL_OUTOF10: 10

## 2019-02-10 ASSESSMENT — PAIN DESCRIPTION - PROGRESSION
CLINICAL_PROGRESSION: GRADUALLY IMPROVING
CLINICAL_PROGRESSION: GRADUALLY WORSENING
CLINICAL_PROGRESSION: GRADUALLY IMPROVING
CLINICAL_PROGRESSION: NOT CHANGED
CLINICAL_PROGRESSION: GRADUALLY WORSENING

## 2019-02-10 ASSESSMENT — PAIN DESCRIPTION - LOCATION
LOCATION: BACK;OTHER (COMMENT)
LOCATION: BACK
LOCATION: BACK
LOCATION: BACK;FLANK
LOCATION: BACK
LOCATION: BACK;OTHER (COMMENT)
LOCATION: FLANK

## 2019-02-10 ASSESSMENT — PAIN DESCRIPTION - FREQUENCY
FREQUENCY: CONTINUOUS
FREQUENCY: CONTINUOUS
FREQUENCY: INTERMITTENT
FREQUENCY: CONTINUOUS
FREQUENCY: INTERMITTENT

## 2019-02-10 ASSESSMENT — PAIN DESCRIPTION - ONSET
ONSET: AWAKENED FROM SLEEP
ONSET: AWAKENED FROM SLEEP
ONSET: ON-GOING
ONSET: ON-GOING
ONSET: GRADUAL

## 2019-02-10 ASSESSMENT — PAIN - FUNCTIONAL ASSESSMENT
PAIN_FUNCTIONAL_ASSESSMENT: ACTIVITIES ARE NOT PREVENTED
PAIN_FUNCTIONAL_ASSESSMENT: PREVENTS OR INTERFERES SOME ACTIVE ACTIVITIES AND ADLS
PAIN_FUNCTIONAL_ASSESSMENT: PREVENTS OR INTERFERES SOME ACTIVE ACTIVITIES AND ADLS

## 2019-02-10 ASSESSMENT — PAIN DESCRIPTION - PAIN TYPE
TYPE: ACUTE PAIN

## 2019-02-11 VITALS
HEIGHT: 62 IN | HEART RATE: 116 BPM | SYSTOLIC BLOOD PRESSURE: 118 MMHG | WEIGHT: 119.93 LBS | RESPIRATION RATE: 11 BRPM | DIASTOLIC BLOOD PRESSURE: 66 MMHG | OXYGEN SATURATION: 99 % | TEMPERATURE: 98.3 F | BODY MASS INDEX: 22.07 KG/M2

## 2019-02-11 PROBLEM — E10.10 DKA, TYPE 1, NOT AT GOAL (HCC): Status: RESOLVED | Noted: 2019-02-11 | Resolved: 2019-02-11

## 2019-02-11 PROBLEM — E10.10 DKA, TYPE 1, NOT AT GOAL (HCC): Status: ACTIVE | Noted: 2019-02-11

## 2019-02-11 PROBLEM — E10.10 DKA, TYPE 1, NOT AT GOAL (HCC): Status: RESOLVED | Noted: 2018-12-19 | Resolved: 2019-02-11

## 2019-02-11 LAB
ANION GAP SERPL CALCULATED.3IONS-SCNC: 10 MMOL/L (ref 7–16)
ANION GAP SERPL CALCULATED.3IONS-SCNC: 11 MMOL/L (ref 7–16)
ANION GAP SERPL CALCULATED.3IONS-SCNC: 14 MMOL/L (ref 7–16)
BUN BLDV-MCNC: 3 MG/DL (ref 6–20)
BUN BLDV-MCNC: 4 MG/DL (ref 6–20)
BUN BLDV-MCNC: 4 MG/DL (ref 6–20)
CALCIUM SERPL-MCNC: 8.2 MG/DL (ref 8.6–10.2)
CALCIUM SERPL-MCNC: 8.4 MG/DL (ref 8.6–10.2)
CALCIUM SERPL-MCNC: 8.5 MG/DL (ref 8.6–10.2)
CHLORIDE BLD-SCNC: 105 MMOL/L (ref 98–107)
CHLORIDE BLD-SCNC: 110 MMOL/L (ref 98–107)
CHLORIDE BLD-SCNC: 111 MMOL/L (ref 98–107)
CO2: 14 MMOL/L (ref 22–29)
CO2: 15 MMOL/L (ref 22–29)
CO2: 16 MMOL/L (ref 22–29)
CREAT SERPL-MCNC: 0.5 MG/DL (ref 0.5–1)
CREAT SERPL-MCNC: 0.6 MG/DL (ref 0.5–1)
CREAT SERPL-MCNC: 0.6 MG/DL (ref 0.5–1)
GFR AFRICAN AMERICAN: >60
GFR NON-AFRICAN AMERICAN: >60 ML/MIN/1.73
GLUCOSE BLD-MCNC: 148 MG/DL (ref 74–99)
GLUCOSE BLD-MCNC: 171 MG/DL (ref 74–99)
GLUCOSE BLD-MCNC: 253 MG/DL (ref 74–99)
HCT VFR BLD CALC: 33.7 % (ref 34–48)
HEMOGLOBIN: 11.3 G/DL (ref 11.5–15.5)
MAGNESIUM: 1.7 MG/DL (ref 1.6–2.6)
MAGNESIUM: 1.9 MG/DL (ref 1.6–2.6)
MAGNESIUM: 1.9 MG/DL (ref 1.6–2.6)
MCH RBC QN AUTO: 28.9 PG (ref 26–35)
MCHC RBC AUTO-ENTMCNC: 33.5 % (ref 32–34.5)
MCV RBC AUTO: 86.2 FL (ref 80–99.9)
METER GLUCOSE: 138 MG/DL (ref 74–99)
METER GLUCOSE: 138 MG/DL (ref 74–99)
METER GLUCOSE: 139 MG/DL (ref 74–99)
METER GLUCOSE: 148 MG/DL (ref 74–99)
METER GLUCOSE: 178 MG/DL (ref 74–99)
METER GLUCOSE: 178 MG/DL (ref 74–99)
METER GLUCOSE: 179 MG/DL (ref 74–99)
METER GLUCOSE: 188 MG/DL (ref 74–99)
METER GLUCOSE: 189 MG/DL (ref 74–99)
METER GLUCOSE: 192 MG/DL (ref 74–99)
METER GLUCOSE: 226 MG/DL (ref 74–99)
METER GLUCOSE: 229 MG/DL (ref 74–99)
PDW BLD-RTO: 12.7 FL (ref 11.5–15)
PHOSPHORUS: 2.2 MG/DL (ref 2.5–4.5)
PHOSPHORUS: 2.9 MG/DL (ref 2.5–4.5)
PHOSPHORUS: 3.3 MG/DL (ref 2.5–4.5)
PLATELET # BLD: 218 E9/L (ref 130–450)
PMV BLD AUTO: 10.9 FL (ref 7–12)
POTASSIUM SERPL-SCNC: 4.1 MMOL/L (ref 3.5–5)
POTASSIUM SERPL-SCNC: 4.6 MMOL/L (ref 3.5–5)
POTASSIUM SERPL-SCNC: 4.7 MMOL/L (ref 3.5–5)
RBC # BLD: 3.91 E12/L (ref 3.5–5.5)
SODIUM BLD-SCNC: 133 MMOL/L (ref 132–146)
SODIUM BLD-SCNC: 136 MMOL/L (ref 132–146)
SODIUM BLD-SCNC: 137 MMOL/L (ref 132–146)
WBC # BLD: 10.8 E9/L (ref 4.5–11.5)

## 2019-02-11 PROCEDURE — 6360000002 HC RX W HCPCS: Performed by: STUDENT IN AN ORGANIZED HEALTH CARE EDUCATION/TRAINING PROGRAM

## 2019-02-11 PROCEDURE — 84100 ASSAY OF PHOSPHORUS: CPT

## 2019-02-11 PROCEDURE — 36592 COLLECT BLOOD FROM PICC: CPT

## 2019-02-11 PROCEDURE — 82962 GLUCOSE BLOOD TEST: CPT

## 2019-02-11 PROCEDURE — 6360000002 HC RX W HCPCS: Performed by: EMERGENCY MEDICINE

## 2019-02-11 PROCEDURE — 83735 ASSAY OF MAGNESIUM: CPT

## 2019-02-11 PROCEDURE — 6370000000 HC RX 637 (ALT 250 FOR IP): Performed by: INTERNAL MEDICINE

## 2019-02-11 PROCEDURE — 2580000003 HC RX 258: Performed by: EMERGENCY MEDICINE

## 2019-02-11 PROCEDURE — 80048 BASIC METABOLIC PNL TOTAL CA: CPT

## 2019-02-11 PROCEDURE — 2500000003 HC RX 250 WO HCPCS: Performed by: EMERGENCY MEDICINE

## 2019-02-11 PROCEDURE — 36415 COLL VENOUS BLD VENIPUNCTURE: CPT

## 2019-02-11 PROCEDURE — 2580000003 HC RX 258: Performed by: INTERNAL MEDICINE

## 2019-02-11 PROCEDURE — 85027 COMPLETE CBC AUTOMATED: CPT

## 2019-02-11 RX ORDER — OXYCODONE HYDROCHLORIDE AND ACETAMINOPHEN 5; 325 MG/1; MG/1
1 TABLET ORAL EVERY 6 HOURS PRN
Status: DISCONTINUED | OUTPATIENT
Start: 2019-02-11 | End: 2019-02-11 | Stop reason: HOSPADM

## 2019-02-11 RX ORDER — DEXTROSE MONOHYDRATE 25 G/50ML
12.5 INJECTION, SOLUTION INTRAVENOUS PRN
Status: DISCONTINUED | OUTPATIENT
Start: 2019-02-11 | End: 2019-02-11 | Stop reason: HOSPADM

## 2019-02-11 RX ORDER — NICOTINE POLACRILEX 4 MG
15 LOZENGE BUCCAL PRN
Status: DISCONTINUED | OUTPATIENT
Start: 2019-02-11 | End: 2019-02-11 | Stop reason: HOSPADM

## 2019-02-11 RX ORDER — DEXTROSE MONOHYDRATE 50 MG/ML
100 INJECTION, SOLUTION INTRAVENOUS PRN
Status: DISCONTINUED | OUTPATIENT
Start: 2019-02-11 | End: 2019-02-11 | Stop reason: HOSPADM

## 2019-02-11 RX ORDER — IBUPROFEN 400 MG/1
400 TABLET ORAL EVERY 6 HOURS PRN
Status: DISCONTINUED | OUTPATIENT
Start: 2019-02-11 | End: 2019-02-11 | Stop reason: HOSPADM

## 2019-02-11 RX ORDER — INSULIN GLARGINE 100 [IU]/ML
28 INJECTION, SOLUTION SUBCUTANEOUS DAILY
Status: DISCONTINUED | OUTPATIENT
Start: 2019-02-11 | End: 2019-02-11 | Stop reason: HOSPADM

## 2019-02-11 RX ADMIN — SODIUM PHOSPHATE, MONOBASIC, MONOHYDRATE 15 MMOL: 276; 142 INJECTION, SOLUTION INTRAVENOUS at 02:01

## 2019-02-11 RX ADMIN — INSULIN GLARGINE 28 UNITS: 100 INJECTION, SOLUTION SUBCUTANEOUS at 09:59

## 2019-02-11 RX ADMIN — POTASSIUM CHLORIDE 10 MEQ: 10 INJECTION, SOLUTION INTRAVENOUS at 02:01

## 2019-02-11 RX ADMIN — DEXTROSE AND SODIUM CHLORIDE: 5; 450 INJECTION, SOLUTION INTRAVENOUS at 04:14

## 2019-02-11 RX ADMIN — POTASSIUM CHLORIDE 10 MEQ: 10 INJECTION, SOLUTION INTRAVENOUS at 01:37

## 2019-02-11 RX ADMIN — POTASSIUM CHLORIDE 10 MEQ: 10 INJECTION, SOLUTION INTRAVENOUS at 01:03

## 2019-02-11 RX ADMIN — MORPHINE SULFATE 2 MG: 2 INJECTION, SOLUTION INTRAMUSCULAR; INTRAVENOUS at 06:10

## 2019-02-11 RX ADMIN — INSULIN LISPRO 3 UNITS: 100 INJECTION, SOLUTION INTRAVENOUS; SUBCUTANEOUS at 17:03

## 2019-02-11 RX ADMIN — POTASSIUM CHLORIDE 10 MEQ: 10 INJECTION, SOLUTION INTRAVENOUS at 07:02

## 2019-02-11 RX ADMIN — MORPHINE SULFATE 2 MG: 2 INJECTION, SOLUTION INTRAMUSCULAR; INTRAVENOUS at 04:10

## 2019-02-11 RX ADMIN — INSULIN LISPRO 3 UNITS: 100 INJECTION, SOLUTION INTRAVENOUS; SUBCUTANEOUS at 11:08

## 2019-02-11 RX ADMIN — POTASSIUM CHLORIDE 10 MEQ: 10 INJECTION, SOLUTION INTRAVENOUS at 06:43

## 2019-02-11 RX ADMIN — MORPHINE SULFATE 2 MG: 2 INJECTION, SOLUTION INTRAMUSCULAR; INTRAVENOUS at 02:08

## 2019-02-11 RX ADMIN — MORPHINE SULFATE 2 MG: 2 INJECTION, SOLUTION INTRAMUSCULAR; INTRAVENOUS at 00:08

## 2019-02-11 RX ADMIN — Medication 10 ML: at 10:15

## 2019-02-11 ASSESSMENT — PAIN DESCRIPTION - PROGRESSION
CLINICAL_PROGRESSION: GRADUALLY WORSENING

## 2019-02-11 ASSESSMENT — PAIN SCALES - GENERAL
PAINLEVEL_OUTOF10: 7
PAINLEVEL_OUTOF10: 0
PAINLEVEL_OUTOF10: 0
PAINLEVEL_OUTOF10: 7
PAINLEVEL_OUTOF10: 7
PAINLEVEL_OUTOF10: 9

## 2019-02-11 ASSESSMENT — PAIN DESCRIPTION - PAIN TYPE
TYPE: ACUTE PAIN

## 2019-02-11 ASSESSMENT — PAIN DESCRIPTION - ORIENTATION
ORIENTATION: LEFT;RIGHT
ORIENTATION: RIGHT;LEFT
ORIENTATION: RIGHT;LEFT

## 2019-02-11 ASSESSMENT — PAIN DESCRIPTION - ONSET
ONSET: AWAKENED FROM SLEEP
ONSET: AWAKENED FROM SLEEP
ONSET: GRADUAL

## 2019-02-11 ASSESSMENT — PAIN DESCRIPTION - FREQUENCY
FREQUENCY: INTERMITTENT

## 2019-02-11 ASSESSMENT — PAIN DESCRIPTION - DESCRIPTORS
DESCRIPTORS: ACHING;DULL;DISCOMFORT
DESCRIPTORS: ACHING;DULL;DISCOMFORT
DESCRIPTORS: ACHING;DISCOMFORT;NAGGING

## 2019-02-11 ASSESSMENT — PAIN DESCRIPTION - LOCATION
LOCATION: BACK

## 2019-02-14 ENCOUNTER — OFFICE VISIT (OUTPATIENT)
Dept: ENDOCRINOLOGY | Age: 24
End: 2019-02-14
Payer: COMMERCIAL

## 2019-02-14 VITALS
OXYGEN SATURATION: 99 % | HEIGHT: 62 IN | SYSTOLIC BLOOD PRESSURE: 98 MMHG | WEIGHT: 117.4 LBS | BODY MASS INDEX: 21.6 KG/M2 | HEART RATE: 65 BPM | DIASTOLIC BLOOD PRESSURE: 62 MMHG

## 2019-02-14 DIAGNOSIS — R53.82 CHRONIC FATIGUE: ICD-10-CM

## 2019-02-14 DIAGNOSIS — E55.9 VITAMIN D DEFICIENCY: Primary | ICD-10-CM

## 2019-02-14 DIAGNOSIS — E10.8 TYPE 1 DIABETES MELLITUS WITH COMPLICATION (HCC): ICD-10-CM

## 2019-02-14 LAB — HBA1C MFR BLD: NORMAL %

## 2019-02-14 PROCEDURE — G8484 FLU IMMUNIZE NO ADMIN: HCPCS | Performed by: INTERNAL MEDICINE

## 2019-02-14 PROCEDURE — 1036F TOBACCO NON-USER: CPT | Performed by: INTERNAL MEDICINE

## 2019-02-14 PROCEDURE — 2022F DILAT RTA XM EVC RTNOPTHY: CPT | Performed by: INTERNAL MEDICINE

## 2019-02-14 PROCEDURE — G8420 CALC BMI NORM PARAMETERS: HCPCS | Performed by: INTERNAL MEDICINE

## 2019-02-14 PROCEDURE — 83036 HEMOGLOBIN GLYCOSYLATED A1C: CPT | Performed by: INTERNAL MEDICINE

## 2019-02-14 PROCEDURE — 99204 OFFICE O/P NEW MOD 45 MIN: CPT | Performed by: INTERNAL MEDICINE

## 2019-02-14 PROCEDURE — 3046F HEMOGLOBIN A1C LEVEL >9.0%: CPT | Performed by: INTERNAL MEDICINE

## 2019-02-14 PROCEDURE — G8427 DOCREV CUR MEDS BY ELIG CLIN: HCPCS | Performed by: INTERNAL MEDICINE

## 2019-02-14 RX ORDER — DOCUSATE SODIUM 100 MG/1
CAPSULE, LIQUID FILLED ORAL 2 TIMES DAILY
COMMUNITY
Start: 2019-02-13 | End: 2019-04-18

## 2019-02-14 RX ORDER — NITROFURANTOIN 25; 75 MG/1; MG/1
100 CAPSULE ORAL 2 TIMES DAILY
COMMUNITY
Start: 2019-02-13 | End: 2019-04-18

## 2019-02-15 LAB
BLOOD CULTURE, ROUTINE: NORMAL
CULTURE, BLOOD 2: ABNORMAL
CULTURE, BLOOD 2: ABNORMAL
ORGANISM: ABNORMAL

## 2019-02-17 LAB — MRSA CULTURE ONLY: NORMAL

## 2019-03-23 ENCOUNTER — APPOINTMENT (OUTPATIENT)
Dept: GENERAL RADIOLOGY | Age: 24
End: 2019-03-23
Payer: COMMERCIAL

## 2019-03-23 ENCOUNTER — HOSPITAL ENCOUNTER (EMERGENCY)
Age: 24
Discharge: HOME | End: 2019-03-24
Attending: EMERGENCY MEDICINE
Payer: COMMERCIAL

## 2019-03-23 DIAGNOSIS — K59.00 CONSTIPATION, UNSPECIFIED CONSTIPATION TYPE: Primary | ICD-10-CM

## 2019-03-23 DIAGNOSIS — R73.9 HYPERGLYCEMIA: ICD-10-CM

## 2019-03-23 LAB
BASOPHILS ABSOLUTE: 0.03 E9/L (ref 0–0.2)
BASOPHILS RELATIVE PERCENT: 0.3 % (ref 0–2)
BILIRUBIN URINE: NEGATIVE
BLOOD, URINE: NEGATIVE
CLARITY: CLEAR
COLOR: ABNORMAL
EOSINOPHILS ABSOLUTE: 0.05 E9/L (ref 0.05–0.5)
EOSINOPHILS RELATIVE PERCENT: 0.6 % (ref 0–6)
GLUCOSE URINE: >=1000 MG/DL
HCG, URINE, POC: NEGATIVE
HCT VFR BLD CALC: 41.3 % (ref 34–48)
HEMOGLOBIN: 13.8 G/DL (ref 11.5–15.5)
IMMATURE GRANULOCYTES #: 0.03 E9/L
IMMATURE GRANULOCYTES %: 0.3 % (ref 0–5)
KETONES, URINE: NEGATIVE MG/DL
LEUKOCYTE ESTERASE, URINE: NEGATIVE
LYMPHOCYTES ABSOLUTE: 3.03 E9/L (ref 1.5–4)
LYMPHOCYTES RELATIVE PERCENT: 34 % (ref 20–42)
Lab: NORMAL
MCH RBC QN AUTO: 28.2 PG (ref 26–35)
MCHC RBC AUTO-ENTMCNC: 33.4 % (ref 32–34.5)
MCV RBC AUTO: 84.5 FL (ref 80–99.9)
MONOCYTES ABSOLUTE: 0.34 E9/L (ref 0.1–0.95)
MONOCYTES RELATIVE PERCENT: 3.8 % (ref 2–12)
NEGATIVE QC PASS/FAIL: NORMAL
NEUTROPHILS ABSOLUTE: 5.42 E9/L (ref 1.8–7.3)
NEUTROPHILS RELATIVE PERCENT: 61 % (ref 43–80)
NITRITE, URINE: NEGATIVE
PDW BLD-RTO: 12.4 FL (ref 11.5–15)
PH UA: 5.5 (ref 5–9)
PLATELET # BLD: 275 E9/L (ref 130–450)
PMV BLD AUTO: 12.3 FL (ref 7–12)
POSITIVE QC PASS/FAIL: NORMAL
PROTEIN UA: NEGATIVE MG/DL
RBC # BLD: 4.89 E12/L (ref 3.5–5.5)
SPECIFIC GRAVITY UA: <=1.005 (ref 1–1.03)
UROBILINOGEN, URINE: 0.2 E.U./DL
WBC # BLD: 8.9 E9/L (ref 4.5–11.5)

## 2019-03-23 PROCEDURE — 6370000000 HC RX 637 (ALT 250 FOR IP): Performed by: PHYSICIAN ASSISTANT

## 2019-03-23 PROCEDURE — 74022 RADEX COMPL AQT ABD SERIES: CPT

## 2019-03-23 PROCEDURE — 85025 COMPLETE CBC W/AUTO DIFF WBC: CPT

## 2019-03-23 PROCEDURE — 99284 EMERGENCY DEPT VISIT MOD MDM: CPT

## 2019-03-23 PROCEDURE — 36415 COLL VENOUS BLD VENIPUNCTURE: CPT

## 2019-03-23 PROCEDURE — 80048 BASIC METABOLIC PNL TOTAL CA: CPT

## 2019-03-23 PROCEDURE — 81003 URINALYSIS AUTO W/O SCOPE: CPT

## 2019-03-23 PROCEDURE — 83605 ASSAY OF LACTIC ACID: CPT

## 2019-03-23 RX ORDER — ONDANSETRON 4 MG/1
4 TABLET, ORALLY DISINTEGRATING ORAL ONCE
Status: DISCONTINUED | OUTPATIENT
Start: 2019-03-23 | End: 2019-03-24 | Stop reason: HOSPADM

## 2019-03-23 RX ORDER — PANTOPRAZOLE SODIUM 40 MG/10ML
40 INJECTION, POWDER, LYOPHILIZED, FOR SOLUTION INTRAVENOUS ONCE
Status: DISCONTINUED | OUTPATIENT
Start: 2019-03-23 | End: 2019-03-23

## 2019-03-23 RX ORDER — ONDANSETRON 2 MG/ML
4 INJECTION INTRAMUSCULAR; INTRAVENOUS ONCE
Status: DISCONTINUED | OUTPATIENT
Start: 2019-03-23 | End: 2019-03-23

## 2019-03-23 RX ADMIN — LIDOCAINE HYDROCHLORIDE: 20 SOLUTION ORAL; TOPICAL at 23:16

## 2019-03-23 ASSESSMENT — PAIN DESCRIPTION - LOCATION: LOCATION: ABDOMEN

## 2019-03-23 ASSESSMENT — PAIN DESCRIPTION - PAIN TYPE: TYPE: ACUTE PAIN

## 2019-03-23 ASSESSMENT — PAIN SCALES - GENERAL: PAINLEVEL_OUTOF10: 7

## 2019-03-24 VITALS
HEART RATE: 84 BPM | WEIGHT: 120 LBS | HEIGHT: 61 IN | RESPIRATION RATE: 14 BRPM | BODY MASS INDEX: 22.66 KG/M2 | DIASTOLIC BLOOD PRESSURE: 84 MMHG | OXYGEN SATURATION: 100 % | TEMPERATURE: 98 F | SYSTOLIC BLOOD PRESSURE: 122 MMHG

## 2019-03-24 LAB
ANION GAP SERPL CALCULATED.3IONS-SCNC: 14 MMOL/L (ref 7–16)
BETA-HYDROXYBUTYRATE: 1.04 MMOL/L (ref 0.02–0.27)
BUN BLDV-MCNC: 18 MG/DL (ref 6–20)
CALCIUM SERPL-MCNC: 9.2 MG/DL (ref 8.6–10.2)
CHLORIDE BLD-SCNC: 93 MMOL/L (ref 98–107)
CO2: 22 MMOL/L (ref 22–29)
CREAT SERPL-MCNC: 0.6 MG/DL (ref 0.5–1)
GFR AFRICAN AMERICAN: >60
GFR NON-AFRICAN AMERICAN: >60 ML/MIN/1.73
GLUCOSE BLD-MCNC: 599 MG/DL (ref 74–99)
LACTIC ACID: 1.1 MMOL/L (ref 0.5–2.2)
METER GLUCOSE: 331 MG/DL (ref 74–99)
PH VENOUS: 7.42 (ref 7.3–7.42)
POTASSIUM SERPL-SCNC: 4.2 MMOL/L (ref 3.5–5)
SODIUM BLD-SCNC: 129 MMOL/L (ref 132–146)

## 2019-03-24 PROCEDURE — 82010 KETONE BODYS QUAN: CPT

## 2019-03-24 PROCEDURE — 82962 GLUCOSE BLOOD TEST: CPT

## 2019-03-24 PROCEDURE — 2580000003 HC RX 258: Performed by: PHYSICIAN ASSISTANT

## 2019-03-24 PROCEDURE — 6370000000 HC RX 637 (ALT 250 FOR IP): Performed by: PHYSICIAN ASSISTANT

## 2019-03-24 PROCEDURE — 96374 THER/PROPH/DIAG INJ IV PUSH: CPT

## 2019-03-24 PROCEDURE — 36415 COLL VENOUS BLD VENIPUNCTURE: CPT

## 2019-03-24 PROCEDURE — 82800 BLOOD PH: CPT

## 2019-03-24 RX ORDER — 0.9 % SODIUM CHLORIDE 0.9 %
2000 INTRAVENOUS SOLUTION INTRAVENOUS ONCE
Status: COMPLETED | OUTPATIENT
Start: 2019-03-24 | End: 2019-03-24

## 2019-03-24 RX ADMIN — INSULIN HUMAN 10 UNITS: 100 INJECTION, SOLUTION PARENTERAL at 01:56

## 2019-03-24 RX ADMIN — SODIUM CHLORIDE 2000 ML: 9 INJECTION, SOLUTION INTRAVENOUS at 01:55

## 2019-04-11 ENCOUNTER — HOSPITAL ENCOUNTER (OUTPATIENT)
Age: 24
Discharge: HOME | End: 2019-04-13
Payer: COMMERCIAL

## 2019-04-11 ENCOUNTER — OFFICE VISIT (OUTPATIENT)
Dept: OBGYN | Age: 24
End: 2019-04-11
Payer: COMMERCIAL

## 2019-04-11 VITALS
RESPIRATION RATE: 18 BRPM | SYSTOLIC BLOOD PRESSURE: 120 MMHG | HEART RATE: 105 BPM | HEIGHT: 62 IN | WEIGHT: 114 LBS | BODY MASS INDEX: 20.98 KG/M2 | DIASTOLIC BLOOD PRESSURE: 87 MMHG

## 2019-04-11 DIAGNOSIS — N89.8 VAGINAL ODOR: ICD-10-CM

## 2019-04-11 DIAGNOSIS — N89.8 VAGINAL DISCHARGE: Primary | ICD-10-CM

## 2019-04-11 DIAGNOSIS — Z12.4 SCREENING FOR MALIGNANT NEOPLASM OF CERVIX: ICD-10-CM

## 2019-04-11 DIAGNOSIS — N89.8 ITCHING IN THE VAGINAL AREA: ICD-10-CM

## 2019-04-11 LAB
CLUE CELLS: NORMAL
CONTROL: PRESENT
HBA1C MFR BLD: 14 % (ref 4–5.6)
PREGNANCY TEST URINE, POC: NEGATIVE
SOURCE WET PREP: NORMAL
TRICHOMONAS PREP: NORMAL
YEAST WET PREP: NORMAL

## 2019-04-11 PROCEDURE — G0123 SCREEN CERV/VAG THIN LAYER: HCPCS

## 2019-04-11 PROCEDURE — 99214 OFFICE O/P EST MOD 30 MIN: CPT | Performed by: NURSE PRACTITIONER

## 2019-04-11 PROCEDURE — 81025 URINE PREGNANCY TEST: CPT | Performed by: NURSE PRACTITIONER

## 2019-04-11 PROCEDURE — G8427 DOCREV CUR MEDS BY ELIG CLIN: HCPCS | Performed by: NURSE PRACTITIONER

## 2019-04-11 PROCEDURE — 36415 COLL VENOUS BLD VENIPUNCTURE: CPT

## 2019-04-11 PROCEDURE — G8420 CALC BMI NORM PARAMETERS: HCPCS | Performed by: NURSE PRACTITIONER

## 2019-04-11 PROCEDURE — 99213 OFFICE O/P EST LOW 20 MIN: CPT | Performed by: NURSE PRACTITIONER

## 2019-04-11 PROCEDURE — 87491 CHLMYD TRACH DNA AMP PROBE: CPT

## 2019-04-11 PROCEDURE — 87624 HPV HI-RISK TYP POOLED RSLT: CPT

## 2019-04-11 PROCEDURE — 83036 HEMOGLOBIN GLYCOSYLATED A1C: CPT

## 2019-04-11 PROCEDURE — 87210 SMEAR WET MOUNT SALINE/INK: CPT

## 2019-04-11 PROCEDURE — 1036F TOBACCO NON-USER: CPT | Performed by: NURSE PRACTITIONER

## 2019-04-11 PROCEDURE — 87591 N.GONORRHOEAE DNA AMP PROB: CPT

## 2019-04-11 ASSESSMENT — ENCOUNTER SYMPTOMS
RESPIRATORY NEGATIVE: 1
GASTROINTESTINAL NEGATIVE: 1

## 2019-04-11 NOTE — PROGRESS NOTES
Subjective:      Patient ID: Mindy Macias is a 26 y.o. female. HPI:  Patient being seen today at [facility] clinic . clinic with complaint of vaginal itching with thin, gray vaginal discharge. Patient reported that the vaginal discharge has a strong foul,odor, and the vaginal odor was particularly strong  after sex. . Patient  denied fever, chills, nausea or vomiting. She reported that she decided to make an appointment with the office because of her type 1 diabetes and her history of BV  and she could not tolerate the odor,and discharge anymore. Patient was seen in ER for abdominal on 03/24/2019  Denies abdominal pain, N/V/D, constipation, change in stool. : Denies frequency, urgency, hesitancy, change in color or odor, hematuria at this visit. LMP: 03/27/2019  Last pap: N/A      Patient Active Problem List   Diagnosis    Diabetes mellitus type 1, uncontrolled ([facility]    Personal history of noncompliance with medical treatment, presenting hazards to health         Review of Systems   Constitutional: Negative. Respiratory: Negative. Cardiovascular: Negative. Gastrointestinal: Negative. Genitourinary: Positive for vaginal discharge. Vaginal odor and itching   Skin: Negative. Objective:   Physical Exam   Constitutional: She appears well-developed and well-nourished. Cardiovascular: Normal rate, regular rhythm and normal heart sounds. Pulmonary/Chest: Effort normal and breath sounds normal.   Abdominal: Bowel sounds are normal.   Genitourinary: Uterus normal. Pelvic exam was performed with patient supine. There is tenderness on the right labia. There is no rash or lesion on the right labia. There is tenderness on the left labia. There is no rash or lesion on the left labia. Cervix exhibits no motion tenderness, no discharge and no friability. Right adnexum displays no mass and no tenderness. Left adnexum displays no mass and no tenderness. Vaginal discharge found. Genitourinary Comments: thick, gray /white vaginal discharge noted on exanination   Skin: Skin is warm, dry and intact. Psychiatric: She has a normal mood and affect. Her behavior is normal.   Nursing note and vitals reviewed. Assessment:       Diagnosis Orders   1. Vaginal discharge  Wet prep, genital    Hemoglobin A1C    PAP SMEAR   2. Vaginal odor  Wet prep, genital    Hemoglobin A1C    PAP SMEAR   3. Screening for malignant neoplasm of cervix  PAP SMEAR   4. Itching in the vaginal area             Plan:      I spent 30 minutes 16 minuets discussing the following:    Patient educated to wipe from front to back instead of back to front to void contaminating the vagina with bacterial from the rectum. Also, patient was educated  to keep her vulva clean and dry. In addition, patient was educated on to refrain from using agents that are irritating in her vagina, such as strong soaps, feminine hygiene sprays, or douching. Furthermore, patient was  educated to abstain from tight jeans, panty hose with no cotton crotch, or clothing that trap moisture. Have only single sex partner and use condom. Further evaluation and management will be dependent on her clinical presentation and the results of her testing   Will call patient if any screening results come back positive  Patient instructed to call clinic immediately if worsening of symptoms such as persistent fever, chills, abnormal discharge or other signs of infection. Otherwise follow up in 2 week. All questions and concerns addressed. Patient voices understanding of plan of care. Call for any questions                          MIKE Robison - CNP

## 2019-04-15 LAB
CHLAMYDIA TRACHOMATIS AMPLIFIED DET: NORMAL
N GONORRHOEAE AMPLIFIED DET: NORMAL

## 2019-04-18 ENCOUNTER — HOSPITAL ENCOUNTER (EMERGENCY)
Age: 24
Discharge: HOME | End: 2019-04-18
Attending: EMERGENCY MEDICINE
Payer: COMMERCIAL

## 2019-04-18 VITALS
BODY MASS INDEX: 21.71 KG/M2 | DIASTOLIC BLOOD PRESSURE: 87 MMHG | OXYGEN SATURATION: 98 % | SYSTOLIC BLOOD PRESSURE: 138 MMHG | HEIGHT: 61 IN | RESPIRATION RATE: 16 BRPM | HEART RATE: 95 BPM | TEMPERATURE: 98.4 F | WEIGHT: 115 LBS

## 2019-04-18 DIAGNOSIS — L02.214 GROIN ABSCESS: Primary | ICD-10-CM

## 2019-04-18 LAB
CLUE CELLS: NORMAL
CORRESPONDING PAP CASE #: NORMAL
HPV, HIGH RISK: NEGATIVE
SOURCE WET PREP: NORMAL
TRICHOMONAS PREP: NORMAL
YEAST WET PREP: NORMAL

## 2019-04-18 PROCEDURE — 99282 EMERGENCY DEPT VISIT SF MDM: CPT

## 2019-04-18 PROCEDURE — 10060 I&D ABSCESS SIMPLE/SINGLE: CPT

## 2019-04-18 PROCEDURE — 6370000000 HC RX 637 (ALT 250 FOR IP): Performed by: EMERGENCY MEDICINE

## 2019-04-18 PROCEDURE — 87591 N.GONORRHOEAE DNA AMP PROB: CPT

## 2019-04-18 PROCEDURE — 87210 SMEAR WET MOUNT SALINE/INK: CPT

## 2019-04-18 PROCEDURE — 2500000003 HC RX 250 WO HCPCS: Performed by: EMERGENCY MEDICINE

## 2019-04-18 PROCEDURE — 87491 CHLMYD TRACH DNA AMP PROBE: CPT

## 2019-04-18 RX ORDER — IBUPROFEN 200 MG
800 TABLET ORAL EVERY 6 HOURS PRN
COMMUNITY
End: 2020-03-06

## 2019-04-18 RX ORDER — SULFAMETHOXAZOLE AND TRIMETHOPRIM 800; 160 MG/1; MG/1
1 TABLET ORAL ONCE
Status: COMPLETED | OUTPATIENT
Start: 2019-04-18 | End: 2019-04-18

## 2019-04-18 RX ORDER — LIDOCAINE HYDROCHLORIDE AND EPINEPHRINE 10; 10 MG/ML; UG/ML
20 INJECTION, SOLUTION INFILTRATION; PERINEURAL ONCE
Status: COMPLETED | OUTPATIENT
Start: 2019-04-18 | End: 2019-04-18

## 2019-04-18 RX ORDER — SULFAMETHOXAZOLE AND TRIMETHOPRIM 800; 160 MG/1; MG/1
1 TABLET ORAL 2 TIMES DAILY
Qty: 20 TABLET | Refills: 0 | Status: SHIPPED | OUTPATIENT
Start: 2019-04-18 | End: 2019-04-28

## 2019-04-18 RX ORDER — METRONIDAZOLE 500 MG/1
500 TABLET ORAL 2 TIMES DAILY
Qty: 14 TABLET | Refills: 0 | Status: SHIPPED | OUTPATIENT
Start: 2019-04-18 | End: 2019-04-25

## 2019-04-18 RX ADMIN — LIDOCAINE HYDROCHLORIDE AND EPINEPHRINE 20 ML: 10; 10 INJECTION, SOLUTION INFILTRATION; PERINEURAL at 03:12

## 2019-04-18 RX ADMIN — SULFAMETHOXAZOLE AND TRIMETHOPRIM 1 TABLET: 800; 160 TABLET ORAL at 02:04

## 2019-04-18 ASSESSMENT — PAIN DESCRIPTION - PAIN TYPE
TYPE: ACUTE PAIN
TYPE: ACUTE PAIN

## 2019-04-18 ASSESSMENT — PAIN DESCRIPTION - ORIENTATION
ORIENTATION: LEFT
ORIENTATION: LEFT

## 2019-04-18 ASSESSMENT — PAIN DESCRIPTION - LOCATION
LOCATION: GROIN;OTHER (COMMENT)
LOCATION: GROIN

## 2019-04-18 ASSESSMENT — PAIN DESCRIPTION - ONSET: ONSET: ON-GOING

## 2019-04-18 ASSESSMENT — PAIN DESCRIPTION - DESCRIPTORS: DESCRIPTORS: CONSTANT

## 2019-04-18 ASSESSMENT — PAIN DESCRIPTION - FREQUENCY: FREQUENCY: CONTINUOUS

## 2019-04-18 ASSESSMENT — PAIN SCALES - GENERAL: PAINLEVEL_OUTOF10: 9

## 2019-04-18 ASSESSMENT — PAIN DESCRIPTION - PROGRESSION: CLINICAL_PROGRESSION: GRADUALLY WORSENING

## 2019-04-18 NOTE — ED PROVIDER NOTES
Department of Emergency Medicine   ED  Provider Note  Admit Date/RoomTime: 4/18/2019 12:45 AM  ED Room: 16/16      History of Present Illness:  4/18/19, Time: 1:37 AM         Mikal Flores is a 26 y.o. female presenting to the ED for abscess, beginning 1 week ago. The complaint has been persistent, moderate in severity, and worsened by nothing. Pt reports that she has an abscess to her left groin region and has been experiencing some drainage yesterday. Reports no vaginal bleeding. Pt denies any loc, chest pain, sob, palpitations, fever, chills, nausea, vomiting, diarrhea, abdominal pain, neck pain, extremity pain, edema, HA, cough, congestion, visual disturbances, dysuria, constipation, hematuria, weakness, numbness, tingling, dizziness or any other further symptoms at this time. Review of Systems:   Pertinent positives and negatives are stated within HPI, all other systems reviewed and are negative.    --------------------------------------------- PAST HISTORY ------------------------------------------  Past Medical History:  has a past medical history of Bleeding at insertion site, Common femoral artery injury, right, initial encounter, and DM type 1 (diabetes mellitus, type 1) ([facility] Past Surgical History:  has no past surgical history on file. Social History:  reports that she has never smoked. She has never used smokeless tobacco. She reports that she does not drink alcohol or use drugs. Family History: family history includes Diabetes in her maternal grandmother. The patients home medications have been reviewed. Allergies: Patient has no known allergies.     ----------------------------------------------PHYSICAL EXAM--------------------------------------  Constitutional/General: Alert and oriented x3, well appearing, non toxic in NAD  Head: Normocephalic and atraumatic  Eyes: PERRL, EOMI, conjunctiva normal, sclera non icteric  Mouth: Oropharynx clear, handling secretions, no trismus Respiratory: Lungs clear to auscultation bilaterally, no wheezes, rales, or rhonchi. Not in respiratory distress  Cardiovascular:  Regular rate. Regular rhythm. No murmurs, gallops, or rubs. 2+ distal pulses  GI:  Abdomen Soft, Non tender, Non distended. +BS. No rebound, guarding, or rigidity. No pulsatile masses. Pelvic:  Female chaperone present. 2x2cm round abscess to left groin region above labia. Musculoskeletal: Moves all extremities x 4. Warm and well perfused, no clubbing, cyanosis, or edema. Integument: Skin warm   Neurologic: GCS 15, CN II-XII grossly intact, no focal deficits,   Psychiatric: Normal Affect    -------------------------------------------------- RESULTS -------------------------------------------------  I have personally reviewed all laboratory and imaging results for this patient. Results are listed below. LABS:  Results for orders placed or performed during the hospital encounter of 04/18/19   Wet prep, genital   Result Value Ref Range    Trichomonas Prep None Seen     Yeast, Wet Prep None Seen     Clue Cells, Wet Prep None Seen     Source Wet Prep VAGINAL        RADIOLOGY:  Interpreted by Radiologist.  No orders to display         ------------------------- NURSING NOTES AND VITALS REVIEWED ---------------------------   The nursing notes within the ED encounter and vital signs as below have been reviewed by myself. /87   Pulse 95   Temp 98.4 °F (36.9 °C) (Oral)   Resp 16   Ht 5' 1\" (1.549 m)   Wt 115 lb (52.2 kg)   LMP 03/27/2019 (Exact Date)   SpO2 98%   BMI 21.73 kg/m²   Oxygen Saturation Interpretation: Normal    The patients available past medical records and past encounters were reviewed.       ------------------------- ED COURSE/MEDICAL DECISION MAKING----------------------  Medications   lidocaine-EPINEPHrine 1 percent-1:[id number] injection 20 mL (20 mLs Intradermal Given by Other 4/18/19 6725)   sulfamethoxazole-trimethoprim (BACTRIM DS;SEPTRA DS) 800-160 MG per tablet 1 tablet (1 tablet Oral Given 4/18/19 0204)       Medical Decision Making:    Pt presents for abscess to left groin. Upon exam she had a 2x2cm round abscess to left groin region above labia. See separate I&D procedure note done by Juan Carlos Christina PA-C. Pt to be d/c on abx. Patient was explicitly instructed on specific signs and symptoms on which to return to the emergency room for. Patient was instructed to return to the ER for any new or worsening symptoms. Additional discharge instructions were given verbally. All questions were answered. Patient is comfortable and agreeable with discharge plan. Patient in no acute distress and non-toxic in appearance. This patient's ED course included: a personal history and physicial examination    This patient has remained hemodynamically stable during their ED course. Re-Evaluations:           Re-evaluation. Patients symptoms are improving       Counseling: The emergency provider has spoken with the patient and discussed todays results, in addition to providing specific details for the plan of care and counseling regarding the diagnosis and prognosis. Questions are answered at this time and they are agreeable with the plan.     ---------------------------- IMPRESSION AND DISPOSITION ---------------------------------    IMPRESSION  1. Groin abscess Stable       DISPOSITION  Disposition: Discharge to home  Patient condition is stable    SCRIBE ATTESTATION  4/18/19, 1:37 AM.    This note is prepared by Aminah Hernandez, acting as Scribe for Laura Wilson MD.    Laura Wilson MD: The scribe's documentation has been prepared under my direction and personally reviewed by me in its entirety. I confirm that the note above accurately reflects all work, treatment, procedures, and medical decision making performed by me. NOTE: This report was transcribed using voice recognition software.  Every effort was made to ensure accuracy; however, inadvertent computerized transcription errors may be present.         Josse Gonzalez MD  04/19/19 9626

## 2019-04-18 NOTE — ED NOTES
PROCEDURE  4/18/19       Time: 0130    INCISION AND DRAINAGE  Risks, benefits and alternatives (for applicable procedures below) described. Performed By: KENA Perales. Indication: Abscess  Informed consent obtained: The patient was counseled regarding the procedure, it's indications, risks, potential complications and alternatives and any questions were answered. Consent was obtained. .  Prep: The skin was cleansed with povidone iodine and draped in a sterile fashion. Anesthetic: The wound area was anesthetized with Lidocaine 1% with epinephrine. Incision: Soft tissue abscess of left  Mons Pubis was Incised by scalpal and moderate fluid was drained. A wound culture was not obtained. The wound  was not irrigated and was packed with iodoform gauze. The wound was then covered with a sterile dressing. Patient tolerated the procedure well.        Pelvic small white discharge pelvic vault      Ana Perales  04/18/19 8060

## 2019-04-18 NOTE — ED NOTES
Discharge instructions given and reviewed with patient. RXs given. Instructed to follow up with Dr Kris Howard and GYN. Questions and concerns addressed. Pt departed ED ambulatory in no apparent distress. Personal belongings taken.      Alberto Crews RN  04/18/19 7745

## 2019-04-18 NOTE — ED TRIAGE NOTES
Pt arrived in ED with c/o left groin pain and some white drainage (yesterday) onset approx 1 week ago. Pt took 800 mg ibuprofen x2 without relief. She has been washing the area in the shower, no topical medications applied. No visitors at bedside.

## 2019-04-23 LAB
CHLAMYDIA TRACHOMATIS AMPLIFIED DET: NORMAL
N GONORRHOEAE AMPLIFIED DET: NORMAL

## 2019-05-01 ENCOUNTER — HOSPITAL ENCOUNTER (EMERGENCY)
Age: 24
Discharge: HOME | End: 2019-05-01
Payer: COMMERCIAL

## 2019-05-01 VITALS
RESPIRATION RATE: 14 BRPM | OXYGEN SATURATION: 98 % | BODY MASS INDEX: 21.71 KG/M2 | WEIGHT: 115 LBS | HEIGHT: 61 IN | SYSTOLIC BLOOD PRESSURE: 120 MMHG | HEART RATE: 110 BPM | TEMPERATURE: 99 F | DIASTOLIC BLOOD PRESSURE: 80 MMHG

## 2019-05-01 DIAGNOSIS — L73.9 FOLLICULITIS: Primary | ICD-10-CM

## 2019-05-01 PROCEDURE — 99282 EMERGENCY DEPT VISIT SF MDM: CPT

## 2019-05-01 ASSESSMENT — PAIN SCALES - GENERAL: PAINLEVEL_OUTOF10: 10

## 2019-05-01 NOTE — ED PROVIDER NOTES
[facility]        Department of Emergency Medicine   ED  Provider Note  Admit Date/RoomTime: 5/1/2019  6:44 PM  ED Room: /  Chief Complaint   Abscess (pimple like areas to groin, patient does shave the area)    History of Present Illness   Source of history provided by:  patient. History/Exam Limitations: none. Deidra Dunn is a 26 y.o. old female who has a past medical history of:   Past Medical History:   Diagnosis Date    Bleeding at insertion site 1/5/2018    Common femoral artery injury, right, initial encounter 1/5/2018    DM type 1 (diabetes mellitus, type 1) ([facility]     presents to the emergency department by private vehicle, for tender area on pubic area, which occured 2 day(s) prior to arrival.  Since onset the symptoms have been persistent, associated with none and pain and mild in severity. She has a history of abscess requiring drainage last month. Pt is insulin dependent diabetic. Iona Pnatoja ROS   Pertinent positives and negatives are stated within HPI, all other systems reviewed and are negative. History reviewed. No pertinent surgical history. Social History:  reports that she has never smoked. She has never used smokeless tobacco. She reports that she does not drink alcohol or use drugs. Family History: family history includes Diabetes in her maternal grandmother. Allergies: Patient has no known allergies. Physical Exam           ED Triage Vitals [05/01/19 1836]   BP Temp Temp Source Pulse Resp SpO2 Height Weight   120/80 99 °F (37.2 °C) Oral 110 14 98 % 5' 1\" (1.549 m) 115 lb (52.2 kg)      Oxygen Saturation Interpretation: Normal.    Constitutional:  Alert, development consistent with age. HEENT:  NC/NT. Airway patent  Extremities: No tenderness or edema noted. Integument:  Normal turgor. Several white heads around pubic hairs all 3mm or less. No diffuse cellulitis. Lymphatics: No lymphangitis or adenopathy noted. Neurological:  Oriented. Motor functions intact.      Lab / Imaging Results   (All laboratory and radiology results have been personally reviewed by myself)  Labs:  No results found for this visit on 05/01/19. Imaging: All Radiology results interpreted by Radiologist unless otherwise noted. No orders to display       ED Course / Medical Decision Making   Medications - No data to display     Consult(s):   None    Procedure(s):   none    MDM:      Plan is for treatment with analgesics, ATB ointment and appropriate outpatient follow-up. Counseling: The emergency provider has spoken with the patient and discussed todays results, in addition to providing specific details for the plan of care and counseling regarding the diagnosis and prognosis. Questions are answered at this time and they are agreeable with the plan. Assessment      1. Folliculitis      Plan   Discharge to home  Patient condition is stable    New Medications     New Prescriptions    MUPIROCIN (BACTROBAN) 2 % OINTMENT    Apply topically 3 times daily. Electronically signed by Nayla An PA-C   DD: 5/1/19  **This report was transcribed using voice recognition software. Every effort was made to ensure accuracy; however, inadvertent computerized transcription errors may be present.   END OF ED PROVIDER NOTE       Nayla An PA-C  05/02/19 [address] [facility] [address] OLENA Wright  05/02/19 3045

## 2019-05-04 ENCOUNTER — HOSPITAL ENCOUNTER (EMERGENCY)
Age: 24
Discharge: HOME | End: 2019-05-05
Payer: COMMERCIAL

## 2019-05-04 DIAGNOSIS — L73.9 FOLLICULITIS: Primary | ICD-10-CM

## 2019-05-04 PROCEDURE — 99282 EMERGENCY DEPT VISIT SF MDM: CPT

## 2019-05-05 VITALS
OXYGEN SATURATION: 100 % | TEMPERATURE: 97.9 F | HEART RATE: 112 BPM | WEIGHT: 115 LBS | DIASTOLIC BLOOD PRESSURE: 94 MMHG | BODY MASS INDEX: 21.71 KG/M2 | SYSTOLIC BLOOD PRESSURE: 138 MMHG | RESPIRATION RATE: 15 BRPM | HEIGHT: 61 IN

## 2019-05-05 PROCEDURE — 6370000000 HC RX 637 (ALT 250 FOR IP): Performed by: NURSE PRACTITIONER

## 2019-05-05 RX ORDER — CEPHALEXIN 500 MG/1
500 CAPSULE ORAL 4 TIMES DAILY
Qty: 28 CAPSULE | Refills: 0 | Status: SHIPPED | OUTPATIENT
Start: 2019-05-05 | End: 2019-05-07 | Stop reason: ALTCHOICE

## 2019-05-05 RX ORDER — CEPHALEXIN 500 MG/1
500 CAPSULE ORAL ONCE
Status: COMPLETED | OUTPATIENT
Start: 2019-05-05 | End: 2019-05-05

## 2019-05-05 RX ORDER — ACETAMINOPHEN 500 MG
1000 TABLET ORAL ONCE
Status: COMPLETED | OUTPATIENT
Start: 2019-05-05 | End: 2019-05-05

## 2019-05-05 RX ADMIN — ACETAMINOPHEN 1000 MG: 500 TABLET ORAL at 01:15

## 2019-05-05 RX ADMIN — CEPHALEXIN 500 MG: 500 CAPSULE ORAL at 01:15

## 2019-05-05 ASSESSMENT — PAIN SCALES - GENERAL: PAINLEVEL_OUTOF10: 9

## 2019-05-05 ASSESSMENT — PAIN DESCRIPTION - FREQUENCY: FREQUENCY: CONTINUOUS

## 2019-05-05 ASSESSMENT — PAIN DESCRIPTION - PAIN TYPE: TYPE: ACUTE PAIN

## 2019-05-05 ASSESSMENT — PAIN DESCRIPTION - DESCRIPTORS: DESCRIPTORS: CONSTANT

## 2019-05-05 ASSESSMENT — PAIN DESCRIPTION - LOCATION: LOCATION: GROIN

## 2019-05-05 NOTE — ED NOTES
Bed: 33  Expected date:   Expected time:   Means of arrival:   Comments:  jaylen Erickson RN  05/04/19 0403

## 2019-05-05 NOTE — ED PROVIDER NOTES
Normal.    Constitutional:  Alert, development consistent with age. HEENT:  NC/NT. Airway patent. Eyes:  PERRL, EOMI, no discharge. Ears:  TMs without perforation, injection, or bulging. External canals clear without exudate. Mouth:  Mucous membranes moist without lesions, tongue and gums normal.  Throat:  Pharynx without injection, exudate, or tonsillar hypertrophy. Airway patient. Neck:  Supple. No lymphadenopathy. Respiratory:  Clear to auscultation and breath sounds equal.  CV:  Regular rate and rhythm. GI:  Abdomen Soft, nontender, +BS. Integument:  Skin turgor: Normal.              Several scattered mildly erythemic pustules to the pubic region with no surrounding erythema or drainage. Neurological:  Orientation age-appropriate unless noted elseware. Motor functions intact. Lab / Imaging Results   (All laboratory and radiology results have been personally reviewed by myself)  Labs:  No results found for this visit on 05/04/19. Imaging: All Radiology results interpreted by Radiologist unless otherwise noted. No orders to display       ED Course / Medical Decision Making     Medications   cephALEXin (KEFLEX) capsule 500 mg (has no administration in time range)   acetaminophen (TYLENOL) tablet 1,000 mg (has no administration in time range)        Consults:   None    Procedures:   none    MDM:   Patient presented with ongoing pubic folliculitis symptoms. The patient will be prescribed Keflex and referred to dermatology. She was instructed to return to the emergency department with any new or worsening symptoms. Counseling: The emergency provider has spoken with the patient and discussed todays results, in addition to providing specific details for the plan of care and counseling regarding the diagnosis and prognosis. Questions are answered at this time and they are agreeable with the plan. Assessment      1.  Folliculitis      Plan   Discharge to home  Patient condition is good    New Medications     New Prescriptions    CEPHALEXIN (KEFLEX) 500 MG CAPSULE    Take 1 capsule by mouth 4 times daily for 7 days     Electronically signed by MIKE Marc CNP   DD: 5/5/19  **This report was transcribed using voice recognition software. Every effort was made to ensure accuracy; however, inadvertent computerized transcription errors may be present.   END OF ED PROVIDER NOTE       Magi Murdock, APRN - CNP  05/05/19 0101  ATTENDING PROVIDER ATTESTATION:     Supervising Physician, on-site, available for consultation, non-participatory in the evaluation or care of this patient       Alda Cervantes MD  05/05/19 0127

## 2019-05-05 NOTE — ED NOTES
Pt alert and oriented x4. Speech clear. Respirations easy/unlabored. Skin warm/dry. Appropriate color. No signs of acute distress noted. Pt/family teaching provided; verbalized understanding. Pt stable for discharge.      Ji Rolon RN  05/05/19 9924

## 2019-05-07 ENCOUNTER — APPOINTMENT (OUTPATIENT)
Dept: GENERAL RADIOLOGY | Age: 24
DRG: 710 | End: 2019-05-07
Payer: COMMERCIAL

## 2019-05-07 ENCOUNTER — HOSPITAL ENCOUNTER (INPATIENT)
Age: 24
LOS: 6 days | Discharge: HOME HEALTH | DRG: 710 | End: 2019-05-13
Attending: EMERGENCY MEDICINE | Admitting: INTERNAL MEDICINE
Payer: COMMERCIAL

## 2019-05-07 ENCOUNTER — APPOINTMENT (OUTPATIENT)
Dept: ULTRASOUND IMAGING | Age: 24
DRG: 710 | End: 2019-05-07
Payer: COMMERCIAL

## 2019-05-07 ENCOUNTER — APPOINTMENT (OUTPATIENT)
Dept: CT IMAGING | Age: 24
DRG: 710 | End: 2019-05-07
Payer: COMMERCIAL

## 2019-05-07 DIAGNOSIS — E10.10 DKA, TYPE 1, NOT AT GOAL (HCC): ICD-10-CM

## 2019-05-07 DIAGNOSIS — L73.8 BACTERIAL FOLLICULITIS: ICD-10-CM

## 2019-05-07 DIAGNOSIS — E10.10 TYPE 1 DIABETES MELLITUS WITH KETOACIDOSIS WITHOUT COMA (HCC): Primary | ICD-10-CM

## 2019-05-07 DIAGNOSIS — N89.8 VAGINAL DISCHARGE: ICD-10-CM

## 2019-05-07 DIAGNOSIS — L02.214 GROIN ABSCESS: ICD-10-CM

## 2019-05-07 PROBLEM — A41.9 SEPSIS (HCC): Status: ACTIVE | Noted: 2019-05-07

## 2019-05-07 LAB
ACETAMINOPHEN LEVEL: <5 MCG/ML (ref 10–30)
ALBUMIN SERPL-MCNC: 4.1 G/DL (ref 3.5–5.2)
ALP BLD-CCNC: 212 U/L (ref 35–104)
ALT SERPL-CCNC: 19 U/L (ref 0–32)
AMPHETAMINE SCREEN, URINE: NOT DETECTED
ANION GAP SERPL CALCULATED.3IONS-SCNC: 14 MMOL/L (ref 7–16)
ANION GAP SERPL CALCULATED.3IONS-SCNC: 20 MMOL/L (ref 7–16)
ANION GAP SERPL CALCULATED.3IONS-SCNC: 36 MMOL/L (ref 7–16)
AST SERPL-CCNC: 20 U/L (ref 0–31)
BARBITURATE SCREEN URINE: NOT DETECTED
BASOPHILS ABSOLUTE: 0.55 E9/L (ref 0–0.2)
BASOPHILS RELATIVE PERCENT: 2.6 % (ref 0–2)
BENZODIAZEPINE SCREEN, URINE: NOT DETECTED
BETA-HYDROXYBUTYRATE: >4.5 MMOL/L (ref 0.02–0.27)
BILIRUB SERPL-MCNC: <0.2 MG/DL (ref 0–1.2)
BILIRUBIN DIRECT: <0.2 MG/DL (ref 0–0.3)
BILIRUBIN URINE: NEGATIVE
BILIRUBIN, INDIRECT: ABNORMAL MG/DL (ref 0–1)
BLOOD, URINE: NEGATIVE
BUN BLDV-MCNC: 10 MG/DL (ref 6–20)
BUN BLDV-MCNC: 14 MG/DL (ref 6–20)
BUN BLDV-MCNC: 8 MG/DL (ref 6–20)
CALCIUM SERPL-MCNC: 10.1 MG/DL (ref 8.6–10.2)
CALCIUM SERPL-MCNC: 7.9 MG/DL (ref 8.6–10.2)
CALCIUM SERPL-MCNC: 8.1 MG/DL (ref 8.6–10.2)
CANNABINOID SCREEN URINE: NOT DETECTED
CHLORIDE BLD-SCNC: 107 MMOL/L (ref 98–107)
CHLORIDE BLD-SCNC: 109 MMOL/L (ref 98–107)
CHLORIDE BLD-SCNC: 95 MMOL/L (ref 98–107)
CHP ED QC CHECK: YES
CLARITY: CLEAR
CLUE CELLS: NORMAL
CO2: 12 MMOL/L (ref 22–29)
CO2: 14 MMOL/L (ref 22–29)
CO2: 3 MMOL/L (ref 22–29)
COCAINE METABOLITE SCREEN URINE: NOT DETECTED
COHB: 0.8 % (ref 0–1.5)
COLOR: YELLOW
CREAT SERPL-MCNC: 0.5 MG/DL (ref 0.5–1)
CREAT SERPL-MCNC: 0.6 MG/DL (ref 0.5–1)
CREAT SERPL-MCNC: 0.7 MG/DL (ref 0.5–1)
CRITICAL: ABNORMAL
DATE ANALYZED: ABNORMAL
DATE OF COLLECTION: ABNORMAL
EKG ATRIAL RATE: 138 BPM
EKG P AXIS: 80 DEGREES
EKG P-R INTERVAL: 120 MS
EKG Q-T INTERVAL: 368 MS
EKG QRS DURATION: 62 MS
EKG QTC CALCULATION (BAZETT): 555 MS
EKG R AXIS: 65 DEGREES
EKG T AXIS: 65 DEGREES
EKG VENTRICULAR RATE: 137 BPM
EOSINOPHILS ABSOLUTE: 0 E9/L (ref 0.05–0.5)
EOSINOPHILS RELATIVE PERCENT: 0 % (ref 0–6)
ETHANOL: <10 MG/DL (ref 0–0.08)
GFR AFRICAN AMERICAN: >60
GFR NON-AFRICAN AMERICAN: >60 ML/MIN/1.73
GLUCOSE BLD-MCNC: 155 MG/DL (ref 74–99)
GLUCOSE BLD-MCNC: 222 MG/DL (ref 74–99)
GLUCOSE BLD-MCNC: 498 MG/DL
GLUCOSE BLD-MCNC: 627 MG/DL (ref 74–99)
GLUCOSE URINE: 100 MG/DL
HCG QUALITATIVE: NEGATIVE
HCT VFR BLD CALC: 42.2 % (ref 34–48)
HEMOGLOBIN: 13.8 G/DL (ref 11.5–15.5)
HHB: 1.1 % (ref 0–5)
KETONES, URINE: >=80 MG/DL
LAB: ABNORMAL
LACTIC ACID: 1.7 MMOL/L (ref 0.5–2.2)
LEUKOCYTE ESTERASE, URINE: NEGATIVE
LIPASE: 6 U/L (ref 13–60)
LYMPHOCYTES ABSOLUTE: 3.41 E9/L (ref 1.5–4)
LYMPHOCYTES RELATIVE PERCENT: 15.7 % (ref 20–42)
Lab: ABNORMAL
MAGNESIUM: 1.5 MG/DL (ref 1.6–2.6)
MAGNESIUM: 1.7 MG/DL (ref 1.6–2.6)
MCH RBC QN AUTO: 28.3 PG (ref 26–35)
MCHC RBC AUTO-ENTMCNC: 32.7 % (ref 32–34.5)
MCV RBC AUTO: 86.7 FL (ref 80–99.9)
METER GLUCOSE: 141 MG/DL (ref 74–99)
METER GLUCOSE: 158 MG/DL (ref 74–99)
METER GLUCOSE: 159 MG/DL (ref 74–99)
METER GLUCOSE: 160 MG/DL (ref 74–99)
METER GLUCOSE: 178 MG/DL (ref 74–99)
METER GLUCOSE: 182 MG/DL (ref 74–99)
METER GLUCOSE: 184 MG/DL (ref 74–99)
METER GLUCOSE: 201 MG/DL (ref 74–99)
METER GLUCOSE: 228 MG/DL (ref 74–99)
METER GLUCOSE: 305 MG/DL (ref 74–99)
METER GLUCOSE: 439 MG/DL (ref 74–99)
METER GLUCOSE: 498 MG/DL (ref 74–99)
METER GLUCOSE: 500 MG/DL (ref 74–99)
METHADONE SCREEN, URINE: NOT DETECTED
METHB: 0.4 % (ref 0–1.5)
MODE: ABNORMAL
MONOCYTES ABSOLUTE: 0.85 E9/L (ref 0.1–0.95)
MONOCYTES RELATIVE PERCENT: 4.3 % (ref 2–12)
NEUTROPHILS ABSOLUTE: 16.4 E9/L (ref 1.8–7.3)
NEUTROPHILS RELATIVE PERCENT: 77.4 % (ref 43–80)
NITRITE, URINE: NEGATIVE
NUCLEATED RED BLOOD CELLS: 0 /100 WBC
O2 CONTENT: 20.6 ML/DL
O2 SATURATION: 98.9 % (ref 92–98.5)
O2HB: 97.7 % (ref 94–97)
OPERATOR ID: 467
OPIATE SCREEN URINE: NOT DETECTED
PATIENT TEMP: 37 C
PCO2: <15 MMHG (ref 35–45)
PDW BLD-RTO: 13.2 FL (ref 11.5–15)
PH BLOOD GAS: 7.18 (ref 7.35–7.45)
PH UA: 5 (ref 5–9)
PH VENOUS: 7.17 (ref 7.3–7.42)
PHENCYCLIDINE SCREEN URINE: NOT DETECTED
PHOSPHORUS: 2.4 MG/DL (ref 2.5–4.5)
PHOSPHORUS: 2.7 MG/DL (ref 2.5–4.5)
PLATELET # BLD: 393 E9/L (ref 130–450)
PMV BLD AUTO: 11.5 FL (ref 7–12)
PO2: 159.9 MMHG (ref 60–100)
POTASSIUM REFLEX MAGNESIUM: 5 MMOL/L (ref 3.5–5)
POTASSIUM SERPL-SCNC: 3.3 MMOL/L (ref 3.5–5)
POTASSIUM SERPL-SCNC: 4.3 MMOL/L (ref 3.5–5)
PROPOXYPHENE SCREEN: NOT DETECTED
PROTEIN UA: NEGATIVE MG/DL
RBC # BLD: 4.87 E12/L (ref 3.5–5.5)
SALICYLATE, SERUM: <0.3 MG/DL (ref 0–30)
SODIUM BLD-SCNC: 134 MMOL/L (ref 132–146)
SODIUM BLD-SCNC: 135 MMOL/L (ref 132–146)
SODIUM BLD-SCNC: 141 MMOL/L (ref 132–146)
SOURCE WET PREP: NORMAL
SOURCE, BLOOD GAS: ABNORMAL
SPECIFIC GRAVITY UA: <=1.005 (ref 1–1.03)
THB: 14.8 G/DL (ref 11.5–16.5)
TIME ANALYZED: 949
TOTAL PROTEIN: 8.6 G/DL (ref 6.4–8.3)
TRICHOMONAS PREP: NORMAL
TRICYCLIC ANTIDEPRESSANTS SCREEN SERUM: NEGATIVE NG/ML
UROBILINOGEN, URINE: 0.2 E.U./DL
WBC # BLD: 21.3 E9/L (ref 4.5–11.5)
YEAST WET PREP: NORMAL

## 2019-05-07 PROCEDURE — 6360000002 HC RX W HCPCS

## 2019-05-07 PROCEDURE — 87210 SMEAR WET MOUNT SALINE/INK: CPT

## 2019-05-07 PROCEDURE — 84703 CHORIONIC GONADOTROPIN ASSAY: CPT

## 2019-05-07 PROCEDURE — 80048 BASIC METABOLIC PNL TOTAL CA: CPT

## 2019-05-07 PROCEDURE — 82805 BLOOD GASES W/O2 SATURATION: CPT

## 2019-05-07 PROCEDURE — 6370000000 HC RX 637 (ALT 250 FOR IP): Performed by: INTERNAL MEDICINE

## 2019-05-07 PROCEDURE — 83690 ASSAY OF LIPASE: CPT

## 2019-05-07 PROCEDURE — 36415 COLL VENOUS BLD VENIPUNCTURE: CPT

## 2019-05-07 PROCEDURE — 96375 TX/PRO/DX INJ NEW DRUG ADDON: CPT

## 2019-05-07 PROCEDURE — 6360000002 HC RX W HCPCS: Performed by: NURSE PRACTITIONER

## 2019-05-07 PROCEDURE — 87040 BLOOD CULTURE FOR BACTERIA: CPT

## 2019-05-07 PROCEDURE — 85025 COMPLETE CBC W/AUTO DIFF WBC: CPT

## 2019-05-07 PROCEDURE — 2500000003 HC RX 250 WO HCPCS: Performed by: EMERGENCY MEDICINE

## 2019-05-07 PROCEDURE — 99285 EMERGENCY DEPT VISIT HI MDM: CPT

## 2019-05-07 PROCEDURE — 6360000002 HC RX W HCPCS: Performed by: EMERGENCY MEDICINE

## 2019-05-07 PROCEDURE — 84100 ASSAY OF PHOSPHORUS: CPT

## 2019-05-07 PROCEDURE — 80307 DRUG TEST PRSMV CHEM ANLYZR: CPT

## 2019-05-07 PROCEDURE — 71045 X-RAY EXAM CHEST 1 VIEW: CPT

## 2019-05-07 PROCEDURE — 2580000003 HC RX 258: Performed by: EMERGENCY MEDICINE

## 2019-05-07 PROCEDURE — 87088 URINE BACTERIA CULTURE: CPT

## 2019-05-07 PROCEDURE — 76830 TRANSVAGINAL US NON-OB: CPT

## 2019-05-07 PROCEDURE — 99291 CRITICAL CARE FIRST HOUR: CPT | Performed by: INTERNAL MEDICINE

## 2019-05-07 PROCEDURE — 6360000002 HC RX W HCPCS: Performed by: INTERNAL MEDICINE

## 2019-05-07 PROCEDURE — 6370000000 HC RX 637 (ALT 250 FOR IP): Performed by: STUDENT IN AN ORGANIZED HEALTH CARE EDUCATION/TRAINING PROGRAM

## 2019-05-07 PROCEDURE — 74176 CT ABD & PELVIS W/O CONTRAST: CPT

## 2019-05-07 PROCEDURE — 96372 THER/PROPH/DIAG INJ SC/IM: CPT

## 2019-05-07 PROCEDURE — 83735 ASSAY OF MAGNESIUM: CPT

## 2019-05-07 PROCEDURE — 6370000000 HC RX 637 (ALT 250 FOR IP): Performed by: EMERGENCY MEDICINE

## 2019-05-07 PROCEDURE — 93010 ELECTROCARDIOGRAM REPORT: CPT | Performed by: INTERNAL MEDICINE

## 2019-05-07 PROCEDURE — 6370000000 HC RX 637 (ALT 250 FOR IP): Performed by: NURSE PRACTITIONER

## 2019-05-07 PROCEDURE — 96365 THER/PROPH/DIAG IV INF INIT: CPT

## 2019-05-07 PROCEDURE — 2580000003 HC RX 258: Performed by: NURSE PRACTITIONER

## 2019-05-07 PROCEDURE — 80076 HEPATIC FUNCTION PANEL: CPT

## 2019-05-07 PROCEDURE — 51702 INSERT TEMP BLADDER CATH: CPT

## 2019-05-07 PROCEDURE — 82962 GLUCOSE BLOOD TEST: CPT

## 2019-05-07 PROCEDURE — 2000000000 HC ICU R&B

## 2019-05-07 PROCEDURE — 93005 ELECTROCARDIOGRAM TRACING: CPT | Performed by: EMERGENCY MEDICINE

## 2019-05-07 PROCEDURE — 83605 ASSAY OF LACTIC ACID: CPT

## 2019-05-07 PROCEDURE — 87081 CULTURE SCREEN ONLY: CPT

## 2019-05-07 PROCEDURE — 82800 BLOOD PH: CPT

## 2019-05-07 PROCEDURE — G0480 DRUG TEST DEF 1-7 CLASSES: HCPCS

## 2019-05-07 PROCEDURE — 82010 KETONE BODYS QUAN: CPT

## 2019-05-07 PROCEDURE — 81003 URINALYSIS AUTO W/O SCOPE: CPT

## 2019-05-07 RX ORDER — ONDANSETRON 2 MG/ML
4 INJECTION INTRAMUSCULAR; INTRAVENOUS ONCE
Status: COMPLETED | OUTPATIENT
Start: 2019-05-07 | End: 2019-05-07

## 2019-05-07 RX ORDER — INSULIN ASPART 100 [IU]/ML
4-10 INJECTION, SOLUTION INTRAVENOUS; SUBCUTANEOUS
Refills: 1 | COMMUNITY
Start: 2019-04-15 | End: 2019-08-15 | Stop reason: SDUPTHER

## 2019-05-07 RX ORDER — FENTANYL CITRATE 50 UG/ML
INJECTION, SOLUTION INTRAMUSCULAR; INTRAVENOUS
Status: COMPLETED
Start: 2019-05-07 | End: 2019-05-07

## 2019-05-07 RX ORDER — TRAMADOL HYDROCHLORIDE 50 MG/1
50 TABLET ORAL EVERY 6 HOURS PRN
Status: DISCONTINUED | OUTPATIENT
Start: 2019-05-07 | End: 2019-05-08

## 2019-05-07 RX ORDER — SODIUM CHLORIDE 0.9 % (FLUSH) 0.9 %
10 SYRINGE (ML) INJECTION 2 TIMES DAILY
Status: DISCONTINUED | OUTPATIENT
Start: 2019-05-07 | End: 2019-05-13 | Stop reason: HOSPADM

## 2019-05-07 RX ORDER — CLINDAMYCIN PHOSPHATE 900 MG/50ML
900 INJECTION INTRAVENOUS ONCE
Status: DISCONTINUED | OUTPATIENT
Start: 2019-05-07 | End: 2019-05-07

## 2019-05-07 RX ORDER — DEXTROSE MONOHYDRATE 25 G/50ML
12.5 INJECTION, SOLUTION INTRAVENOUS PRN
Status: DISCONTINUED | OUTPATIENT
Start: 2019-05-07 | End: 2019-05-08 | Stop reason: DRUGHIGH

## 2019-05-07 RX ORDER — 0.9 % SODIUM CHLORIDE 0.9 %
1000 INTRAVENOUS SOLUTION INTRAVENOUS ONCE
Status: COMPLETED | OUTPATIENT
Start: 2019-05-07 | End: 2019-05-07

## 2019-05-07 RX ORDER — SODIUM CHLORIDE 0.9 % (FLUSH) 0.9 %
10 SYRINGE (ML) INJECTION PRN
Status: DISCONTINUED | OUTPATIENT
Start: 2019-05-07 | End: 2019-05-13 | Stop reason: HOSPADM

## 2019-05-07 RX ORDER — DEXTROSE, SODIUM CHLORIDE, AND POTASSIUM CHLORIDE 5; .45; .15 G/100ML; G/100ML; G/100ML
INJECTION INTRAVENOUS CONTINUOUS PRN
Status: DISPENSED | OUTPATIENT
Start: 2019-05-07 | End: 2019-05-08

## 2019-05-07 RX ORDER — DOXYCYCLINE HYCLATE 100 MG/1
100 CAPSULE ORAL EVERY 12 HOURS SCHEDULED
Status: DISCONTINUED | OUTPATIENT
Start: 2019-05-08 | End: 2019-05-07

## 2019-05-07 RX ORDER — OXYCODONE HYDROCHLORIDE AND ACETAMINOPHEN 5; 325 MG/1; MG/1
1 TABLET ORAL EVERY 6 HOURS PRN
Status: DISCONTINUED | OUTPATIENT
Start: 2019-05-07 | End: 2019-05-11

## 2019-05-07 RX ORDER — GINSENG 100 MG
CAPSULE ORAL 3 TIMES DAILY
Status: DISCONTINUED | OUTPATIENT
Start: 2019-05-07 | End: 2019-05-13 | Stop reason: HOSPADM

## 2019-05-07 RX ORDER — SODIUM CHLORIDE 9 MG/ML
INJECTION, SOLUTION INTRAVENOUS CONTINUOUS
Status: DISCONTINUED | OUTPATIENT
Start: 2019-05-07 | End: 2019-05-07

## 2019-05-07 RX ORDER — POTASSIUM CHLORIDE 7.45 MG/ML
10 INJECTION INTRAVENOUS PRN
Status: DISCONTINUED | OUTPATIENT
Start: 2019-05-07 | End: 2019-05-08

## 2019-05-07 RX ORDER — 0.9 % SODIUM CHLORIDE 0.9 %
15 INTRAVENOUS SOLUTION INTRAVENOUS ONCE
Status: DISCONTINUED | OUTPATIENT
Start: 2019-05-07 | End: 2019-05-07

## 2019-05-07 RX ORDER — MAGNESIUM SULFATE 1 G/100ML
1 INJECTION INTRAVENOUS PRN
Status: DISCONTINUED | OUTPATIENT
Start: 2019-05-07 | End: 2019-05-08

## 2019-05-07 RX ORDER — FENTANYL CITRATE 50 UG/ML
25 INJECTION, SOLUTION INTRAMUSCULAR; INTRAVENOUS ONCE
Status: COMPLETED | OUTPATIENT
Start: 2019-05-07 | End: 2019-05-07

## 2019-05-07 RX ORDER — SODIUM CHLORIDE 9 MG/ML
INJECTION, SOLUTION INTRAVENOUS ONCE
Status: COMPLETED | OUTPATIENT
Start: 2019-05-07 | End: 2019-05-07

## 2019-05-07 RX ADMIN — POTASSIUM CHLORIDE 10 MEQ: 10 INJECTION, SOLUTION INTRAVENOUS at 16:29

## 2019-05-07 RX ADMIN — Medication 10 ML: at 20:07

## 2019-05-07 RX ADMIN — ONDANSETRON 4 MG: 2 INJECTION INTRAMUSCULAR; INTRAVENOUS at 10:39

## 2019-05-07 RX ADMIN — SODIUM CHLORIDE 1000 ML: 9 INJECTION, SOLUTION INTRAVENOUS at 10:07

## 2019-05-07 RX ADMIN — MAGNESIUM SULFATE 1 G: 1 INJECTION INTRAVENOUS at 22:25

## 2019-05-07 RX ADMIN — SODIUM BICARBONATE 50 MEQ: 84 INJECTION, SOLUTION INTRAVENOUS at 12:05

## 2019-05-07 RX ADMIN — POTASSIUM CHLORIDE 10 MEQ: 10 INJECTION, SOLUTION INTRAVENOUS at 22:26

## 2019-05-07 RX ADMIN — OXYCODONE HYDROCHLORIDE AND ACETAMINOPHEN 1 TABLET: 5; 325 TABLET ORAL at 21:21

## 2019-05-07 RX ADMIN — SODIUM CHLORIDE 5.2 UNITS/HR: 9 INJECTION, SOLUTION INTRAVENOUS at 11:51

## 2019-05-07 RX ADMIN — CEFTRIAXONE 2 G: 2 INJECTION, POWDER, FOR SOLUTION INTRAMUSCULAR; INTRAVENOUS at 10:36

## 2019-05-07 RX ADMIN — SODIUM CHLORIDE 250 ML/HR: 9 INJECTION, SOLUTION INTRAVENOUS at 12:00

## 2019-05-07 RX ADMIN — ENOXAPARIN SODIUM 40 MG: 40 INJECTION SUBCUTANEOUS at 15:30

## 2019-05-07 RX ADMIN — BACITRACIN: 500 OINTMENT TOPICAL at 15:30

## 2019-05-07 RX ADMIN — Medication 10 ML: at 09:30

## 2019-05-07 RX ADMIN — SODIUM PHOSPHATE, MONOBASIC, MONOHYDRATE 10 MMOL: 276; 142 INJECTION, SOLUTION INTRAVENOUS at 17:57

## 2019-05-07 RX ADMIN — POTASSIUM CHLORIDE 10 MEQ: 10 INJECTION, SOLUTION INTRAVENOUS at 17:56

## 2019-05-07 RX ADMIN — Medication 10 ML: at 09:50

## 2019-05-07 RX ADMIN — MAGNESIUM SULFATE 1 G: 1 INJECTION INTRAVENOUS at 21:24

## 2019-05-07 RX ADMIN — POTASSIUM CHLORIDE 10 MEQ: 10 INJECTION, SOLUTION INTRAVENOUS at 21:23

## 2019-05-07 RX ADMIN — POTASSIUM CHLORIDE, DEXTROSE MONOHYDRATE AND SODIUM CHLORIDE: 150; 5; 450 INJECTION, SOLUTION INTRAVENOUS at 21:50

## 2019-05-07 RX ADMIN — BACITRACIN: 500 OINTMENT TOPICAL at 20:07

## 2019-05-07 RX ADMIN — POTASSIUM CHLORIDE 10 MEQ: 10 INJECTION, SOLUTION INTRAVENOUS at 19:55

## 2019-05-07 RX ADMIN — FENTANYL CITRATE 25 MCG: 50 INJECTION, SOLUTION INTRAMUSCULAR; INTRAVENOUS at 11:12

## 2019-05-07 RX ADMIN — DOXYCYCLINE 100 MG: 100 INJECTION, POWDER, LYOPHILIZED, FOR SOLUTION INTRAVENOUS at 15:25

## 2019-05-07 RX ADMIN — OXYCODONE HYDROCHLORIDE AND ACETAMINOPHEN 1 TABLET: 5; 325 TABLET ORAL at 15:15

## 2019-05-07 RX ADMIN — SODIUM BICARBONATE 50 MEQ: 84 INJECTION, SOLUTION INTRAVENOUS at 12:03

## 2019-05-07 RX ADMIN — POTASSIUM CHLORIDE, DEXTROSE MONOHYDRATE AND SODIUM CHLORIDE: 150; 5; 450 INJECTION, SOLUTION INTRAVENOUS at 15:15

## 2019-05-07 RX ADMIN — TRAMADOL HYDROCHLORIDE 50 MG: 50 TABLET, FILM COATED ORAL at 23:12

## 2019-05-07 RX ADMIN — INSULIN HUMAN 10 UNITS: 100 INJECTION, SOLUTION PARENTERAL at 10:11

## 2019-05-07 RX ADMIN — SODIUM CHLORIDE 1000 ML/HR: 9 INJECTION, SOLUTION INTRAVENOUS at 09:48

## 2019-05-07 RX ADMIN — POTASSIUM CHLORIDE 10 MEQ: 10 INJECTION, SOLUTION INTRAVENOUS at 18:51

## 2019-05-07 ASSESSMENT — PAIN DESCRIPTION - PAIN TYPE
TYPE: ACUTE PAIN

## 2019-05-07 ASSESSMENT — PAIN DESCRIPTION - FREQUENCY
FREQUENCY: CONTINUOUS

## 2019-05-07 ASSESSMENT — ENCOUNTER SYMPTOMS
CHEST TIGHTNESS: 0
WHEEZING: 0
ABDOMINAL PAIN: 1
NAUSEA: 1
SHORTNESS OF BREATH: 1
COUGH: 0
CONSTIPATION: 0
DIARRHEA: 0

## 2019-05-07 ASSESSMENT — PAIN DESCRIPTION - ONSET
ONSET: ON-GOING

## 2019-05-07 ASSESSMENT — PAIN DESCRIPTION - PROGRESSION
CLINICAL_PROGRESSION: GRADUALLY WORSENING
CLINICAL_PROGRESSION: NOT CHANGED

## 2019-05-07 ASSESSMENT — PAIN DESCRIPTION - DESCRIPTORS
DESCRIPTORS: DISCOMFORT;PRESSURE
DESCRIPTORS: DISCOMFORT;PRESSURE
DESCRIPTORS: DISCOMFORT;JABBING
DESCRIPTORS: DISCOMFORT;PRESSURE;TENDER

## 2019-05-07 ASSESSMENT — PAIN DESCRIPTION - DIRECTION
RADIATING_TOWARDS: STOMACH
RADIATING_TOWARDS: STOMACH

## 2019-05-07 ASSESSMENT — PAIN - FUNCTIONAL ASSESSMENT
PAIN_FUNCTIONAL_ASSESSMENT: PREVENTS OR INTERFERES WITH ALL ACTIVE AND SOME PASSIVE ACTIVITIES
PAIN_FUNCTIONAL_ASSESSMENT: PREVENTS OR INTERFERES WITH ALL ACTIVE AND SOME PASSIVE ACTIVITIES

## 2019-05-07 ASSESSMENT — PAIN DESCRIPTION - LOCATION
LOCATION: PELVIS

## 2019-05-07 ASSESSMENT — PAIN SCALES - GENERAL
PAINLEVEL_OUTOF10: 10
PAINLEVEL_OUTOF10: 0
PAINLEVEL_OUTOF10: 10
PAINLEVEL_OUTOF10: 0
PAINLEVEL_OUTOF10: 0

## 2019-05-07 NOTE — ED PROVIDER NOTES
ED Attending  CC: yes      HPI:  5/7/19, Time: 11:54 AM         Nahomi Ames is a 26 y.o. female presenting to the ED by ambulance for elevated blood sugar. Patient states for the last several days which is elevated. Patient states that she does take insulin but has not taken her insulin over the past 24 hours. Patient states that she is nauseated and vomiting and fatigued. Patient also states that she is having lower abdominal pain she has been dealing with abscess on her perineal area for the last several weeks patient states that she has been on antibiotics. Patient denies any fever or chills. ROS:   Pertinent positives and negatives are stated within HPI, all other systems reviewed and are negative.  --------------------------------------------- PAST HISTORY ---------------------------------------------  Past Medical History:  has a past medical history of Bleeding at insertion site, Common femoral artery injury, right, initial encounter, and DM type 1 (diabetes mellitus, type 1) ([facility] [address] Past Surgical History:  has no past surgical history on file. Social History:  reports that she has never smoked. She has never used smokeless tobacco. She reports that she does not drink alcohol or use drugs. Family History: family history includes Diabetes in her maternal grandmother. The patients home medications have been reviewed. Allergies: Patient has no known allergies. ---------------------------------------------------PHYSICAL EXAM--------------------------------------    Constitutional/General: Alert and oriented x3, well appearing, non toxic in NAD  Head: Normocephalic and atraumatic  Eyes: PERRL, EOMI  Mouth: Oropharynx clear, handling secretions, no trismus  Neck: Supple, full ROM, non tender to palpation in the midline, no stridor, no crepitus, no meningeal signs  Pulmonary: Lungs clear to auscultation bilaterally, no wheezes, rales, or rhonchi.  Not in respiratory distress  Cardiovascular: Regular rate. Regular rhythm. No murmurs, gallops, or rubs. 2+ distal pulses  Chest: no chest wall tenderness  Abdomen: Soft. Non tender. Non distended. +BS. No rebound, guarding, or rigidity. No pulsatile masses appreciated. Musculoskeletal: Moves all extremities x 4. Warm and well perfused, no clubbing, cyanosis, or edema. Capillary refill <3 seconds  Skin: warm and dry. No rashes. Erythematous draining abscess to the mons pubis with purulent drainage, area was cultured. Pelvic Exam: (chaperone present during examination). External Genitalia: General appearance; normal, Hair distribution; normal, Lesions absent. Urethral Meatus: Size normal, Location normal, Lesions absent, Prolapse absent. Vagina: discharge thick white . Cervix: cervical discharge present - white and thick and cervical motion tenderness absent. Uterus:  normal size, contour, position, consistency, mobility, non-tender. Adenexa: no tenderness bilaterally. Anus/Perineum:  normal.  Neurologic: GCS 15, CN 2-12 grossly intact, no focal deficits, symmetric strength 5/5 in the upper and lower extremities bilaterally  Psych: Normal Affect    -------------------------------------------------- RESULTS -------------------------------------------------  I have personally reviewed all laboratory and imaging results for this patient. Results are listed below.      LABS:  Results for orders placed or performed during the hospital encounter of 05/07/19   Wet prep, genital   Result Value Ref Range    Trichomonas Prep None Seen     Yeast, Wet Prep None Seen     Clue Cells, Wet Prep None Seen     Source Wet Prep VAGINAL    CBC Auto Differential   Result Value Ref Range    WBC 21.3 (H) 4.5 - 11.5 E9/L    RBC 4.87 3.50 - 5.50 E12/L    Hemoglobin 13.8 11.5 - 15.5 g/dL    Hematocrit 42.2 34.0 - 48.0 %    MCV 86.7 80.0 - 99.9 fL    MCH 28.3 26.0 - 35.0 pg    MCHC 32.7 32.0 - 34.5 %    RDW 13.2 11.5 - 15.0 fL    Platelets 393 130 - 450 E9/L    MPV 11.5 7.0 - 12.0 fL    Neutrophils % 77.4 43.0 - 80.0 %    Lymphocytes % 15.7 (L) 20.0 - 42.0 %    Monocytes % 4.3 2.0 - 12.0 %    Eosinophils % 0.0 0.0 - 6.0 %    Basophils % 2.6 (H) 0.0 - 2.0 %    Neutrophils # 16.40 (H) 1.80 - 7.30 E9/L    Lymphocytes # 3.41 1.50 - 4.00 E9/L    Monocytes # 0.85 0.10 - 0.95 E9/L    Eosinophils # 0.00 (L) 0.05 - 0.50 E9/L    Basophils # 0.55 (H) 0.00 - 0.20 E9/L    nRBC 0.0 /100 WBC   Basic Metabolic Panel w/ Reflex to MG   Result Value Ref Range    Sodium 134 132 - 146 mmol/L    Potassium reflex Magnesium 5.0 3.5 - 5.0 mmol/L    Chloride 95 (L) 98 - 107 mmol/L    CO2 3 (LL) 22 - 29 mmol/L    Anion Gap 36 (H) 7 - 16 mmol/L    Glucose 627 (HH) 74 - 99 mg/dL    BUN 14 6 - 20 mg/dL    CREATININE 0.7 0.5 - 1.0 mg/dL    GFR Non-African American >60 >=60 mL/min/1.73    GFR African American >60     Calcium 10.1 8.6 - 10.2 mg/dL   Hepatic Function Panel   Result Value Ref Range    Total Protein 8.6 (H) 6.4 - 8.3 g/dL    Alb 4.1 3.5 - 5.2 g/dL    Alkaline Phosphatase 212 (H) 35 - 104 U/L    ALT 19 0 - 32 U/L    AST 20 0 - 31 U/L    Total Bilirubin <0.2 0.0 - 1.2 mg/dL    Bilirubin, Direct <0.2 0.0 - 0.3 mg/dL    Bilirubin, Indirect see below 0.0 - 1.0 mg/dL   Lipase   Result Value Ref Range    Lipase 6 (L) 13 - 60 U/L   Lactic Acid, Plasma   Result Value Ref Range    Lactic Acid 1.7 0.5 - 2.2 mmol/L   Urinalysis, reflex to microscopic   Result Value Ref Range    Color, UA Yellow Straw/Yellow    Clarity, UA Clear Clear    Glucose, Ur 100 (A) Negative mg/dL    Bilirubin Urine Negative Negative    Ketones, Urine >=80 (A) Negative mg/dL    Specific Gravity, UA <=1.005 1.005 - 1.030    Blood, Urine Negative Negative    pH, UA 5.0 5.0 - 9.0    Protein, UA Negative Negative mg/dL    Urobilinogen, Urine 0.2 <2.0 E.U./dL    Nitrite, Urine Negative Negative    Leukocyte Esterase, Urine Negative Negative   HCG Qualitative, Serum   Result Value Ref Range    hCG Qual NEGATIVE NEGATIVE   Beta-Hydroxybutyrate   Result Value Ref Range    Beta-Hydroxybutyrate >4.50 (H) 0.02 - 0.27 mmol/L   pH, venous   Result Value Ref Range    pH, Christoph 7.17 (LL) 7.30 - 7.42   Serum Drug Screen   Result Value Ref Range    Ethanol Lvl <10 mg/dL    Acetaminophen Level <5.0 (L) 10.0 - 63.4 mcg/mL    Salicylate, Serum <6.0 0.0 - 30.0 mg/dL   Blood Gas, Arterial   Result Value Ref Range    Date Analyzed [id number]     Time Analyzed 0949     Source: Blood Arterial     pH, Blood Gas 7.180 (LL) 7.350 - 7.450    PCO2 <15.0 (LL) 35.0 - 45.0 mmHg    PO2 159.9 (H) 60.0 - 100.0 mmHg    O2 Sat 98.9 (H) 92.0 - 98.5 %    O2Hb 97.7 (H) 94.0 - 97.0 %    COHb 0.8 0.0 - 1.5 %    MetHb 0.4 0.0 - 1.5 %    O2 Content 20.6 mL/dL    HHb 1.1 0.0 - 5.0 %    tHb (est) 14.8 11.5 - 16.5 g/dL    Mode NC- 2 L     Date Of Collection      Time Collected      Pt Temp 37.0 C     ID [id number]     Lab [id number]     Critical(s) Notified Handed report to Dr/RN    POCT Glucose   Result Value Ref Range    Glucose 498 mg/dL    QC OK? yes    POCT Glucose   Result Value Ref Range    Meter Glucose 498 (H) 74 - 99 mg/dL   POCT Glucose   Result Value Ref Range    Meter Glucose 500 (H) 74 - 99 mg/dL   EKG 12 Lead   Result Value Ref Range    Ventricular Rate 137 BPM    Atrial Rate 138 BPM    P-R Interval 120 ms    QRS Duration 62 ms    Q-T Interval 368 ms    QTc Calculation (Bazett) 555 ms    P Axis 80 degrees    R Axis 65 degrees    T Axis 65 degrees       RADIOLOGY:  Interpreted by Radiologist.  XR CHEST PORTABLE   Final Result   Normal chest and unchanged               CT ABDOMEN PELVIS WO CONTRAST   Final Result   Superficial skin thickening and contiguous infiltration/phlegmon   within the anterior subcutaneous fat in the pelvis. There is no   drainable abscess. Findings would suggest possible cellulitis.          US NON OB TRANSVAGINAL    (Results Pending)         EKG Interpretation  Interpreted by emergency department physician    Rhythm: sinus tachycardia  Rate: 130-140  Axis: normal  Conduction: normal  ST Segments: normal  T Waves: normal    Clinical Impression: no acute changes  Comparison to prior EKG: changes compared to previous EKG      ------------------------- NURSING NOTES AND VITALS REVIEWED ---------------------------   The nursing notes within the ED encounter and vital signs as below have been reviewed by myself. /62   Pulse 130   Temp 98.4 °F (36.9 °C)   Resp 26   Wt 115 lb (52.2 kg)   LMP 04/27/2019   SpO2 96%   BMI 21.73 kg/m²   Oxygen Saturation Interpretation: Normal    The patients available past medical records and past encounters were reviewed.         ------------------------------ ED COURSE/MEDICAL DECISION MAKING----------------------  Medications   sodium chloride flush 0.9 % injection 10 mL (10 mLs Intravenous Given 5/7/19 0950)   sodium chloride flush 0.9 % injection 10 mL (10 mLs Intravenous Not Given 5/7/19 1141)   dextrose 50 % solution 12.5 g (has no administration in time range)   potassium chloride 10 mEq/100 mL IVPB (Peripheral Line) (has no administration in time range)   magnesium sulfate 1 g in dextrose 5% 100 mL IVPB (has no administration in time range)   sodium phosphate 10 mmol in dextrose 5 % 250 mL IVPB (has no administration in time range)     Or   sodium phosphate 15 mmol in dextrose 5 % 250 mL IVPB (has no administration in time range)     Or   sodium phosphate 20 mmol in dextrose 5 % 500 mL IVPB (has no administration in time range)   0.9 % sodium chloride bolus (has no administration in time range)     Followed by   0.9 % sodium chloride infusion (250 mL/hr Intravenous New Bag 5/7/19 1200)   insulin regular (HUMULIN R;NOVOLIN R) 100 Units in sodium chloride 0.9 % 100 mL infusion (13.2 Units/kg/hr × 52.2 kg Intravenous New Bag 5/7/19 1151)   dextrose 5 % and 0.45 % NaCl with KCl 20 mEq infusion (has no administration in time range)   clindamycin (CLEOCIN) 900 mg in dextrose 5 % 50 mL IVPB (has diagnosis and prognosis. Questions are answered at this time and they are agreeable with the plan.       --------------------------------- IMPRESSION AND DISPOSITION ---------------------------------    IMPRESSION  1. [facility]    2. Groin abscess    3. Vaginal discharge        DISPOSITION  Disposition: Admit to CCU/ICU  Patient condition is critical        NOTE: This report was transcribed using voice recognition software.  Every effort was made to ensure accuracy; however, inadvertent computerized transcription errors may be present         MIKE Claudio - GEE  05/07/19 [id number]

## 2019-05-07 NOTE — ED NOTES
Attempted to call icu with report. Not available for report at this time. Stated they will return my call shortly. Charge aware. Patient with insulin gtt infusing. , recheck dex due at 1251. nst on monitor with infrequent unifocal pvcs . Mother at bedside.       Rahel Arellano RN  05/07/19 3240

## 2019-05-07 NOTE — H&P
[facility] History and Physical      CHIEF COMPLAINT:  Shortness progress and abdominal pain. History of Present Illness:    21-year-old female with history of diabetes type on presented with the above chief complaint. Patient reported that over the last 1-2 weeks she has been having skin lesions induration and erythema at the pubis, she was evaluated in the ED twice initially she was not prescribed antibiotics then the second time she status post I&D of a small phlegmon and she was given Keflex, she reported that initially she improved then started getting worse with worsening pain and some abdominal suprapubic pain moderate in intensity dull in nature with no radiations, she reported feeling dehydrated lightheaded and dizzy with palpitations as well as some shortness of breath with trigger him to come to the hospital.  She also reported having some yellowish creamy milky vaginal discharge over the last month for which she touch base with her gynecologist who told her she had bacterial infection but she was not given antibiotics. She is sexually active last intercourse was 3 weeks ago and she denied any dyspareunia, no bleeding or irritation    Informant(s) for H&P: Patient    REVIEW OF SYSTEMS:  A comprehensive review of systems was negative except for: what is in the HPI      PMH:  Past Medical History:   Diagnosis Date    Bleeding at insertion site 1/5/2018    Common femoral artery injury, right, initial encounter 1/5/2018    DM type 1 (diabetes mellitus, type 1) ([facility] [address]        Surgical History:  History reviewed. No pertinent surgical history. Medications Prior to Admission:    Prior to Admission medications    Medication Sig Start Date End Date Taking?  Authorizing Provider   NOVOLOG FLEXPEN 100 UNIT/ML injection pen Inject 4-10 Units into the skin 3 times daily (before meals) Take 4 units plus  **Corrective Low Dose Algorithm**  Glucose: Dose:               No Insulin  140-199 1 Unit  200-249 2 Units  250-299 3 Units  300-349 4 Units  350-399 5 Units  Over 399 6 Units 4/15/19  Yes Historical Provider, MD   ibuprofen (ADVIL;MOTRIN) 200 MG tablet Take 800 mg by mouth every 6 hours as needed for Pain   Yes Historical Provider, MD   insulin glargine (BASAGLAR KWIKPEN) 100 UNIT/ML injection pen Inject 25 Units into the skin nightly  Patient taking differently: Inject 28 Units into the skin nightly  5/9/18  Yes Giuseppe Gutierrez MD   acetone, urine, test strip Use daily as directed if bs >250 x2 or illness 8/31/18   Historical Provider, MD   FREESTYLE LITE strip USE AS DIRECTED UP TO 6 TIMES DAILY 8/31/18   Historical Provider, MD   BD PEN NEEDLE SHELBIE U/F 32G X 4 MM MISC USE AS DIRECTED UP TO 6 TIMES A DAY 8/31/18   Historical Provider, MD       Allergies:    Patient has no known allergies. Social History:    reports that she has never smoked. She has never used smokeless tobacco. She reports that she does not drink alcohol or use drugs. Family History:   family history includes Diabetes in her maternal grandmother and paternal grandmother; Stroke in an other family member.        PHYSICAL EXAM:  Vitals:  /62   Pulse 133   Temp 98 °F (36.7 °C) (Axillary)   Resp 22   Wt 115 lb (52.2 kg)   LMP 04/27/2019   SpO2 96%   BMI 21.73 kg/m²     General Appearance: alert and oriented to person, place and time and in moderate acute distress  Skin: warm and dry, open comedones on crusted lesions over the pubic area  Head: normocephalic and atraumatic  Eyes: pupils equal, round, and reactive to light, extraocular eye movements intact, conjunctivae normal  Neck: neck supple and non tender without mass   Pulmonary/Chest: clear to auscultation bilaterally- no wheezes, rales or rhonchi, normal air movement, no respiratory distress  Cardiovascular: Tachycardic, normal S1 and S2 and no carotid bruits  Abdomen: soft, pubic and suprapubic tenderness with the feel of distended using voice recognition software. Every effort was made to ensure accuracy; however, inadvertent computerized transcription errors may be present.   Electronically signed by Rocky Webb MD on 5/7/2019 at 2:30 PM

## 2019-05-07 NOTE — ED NOTES
Bed: 22  Expected date:   Expected time:   Means of arrival:   Comments:  [facility] Hyperglycemia     Nickola Severance, RN  05/07/19 [id number]

## 2019-05-07 NOTE — ED NOTES
Report to icu gunner guerrero. Stable at the time of transfer to  icu in care of muna icu rn  and hortensia martino. Remained connected to cardiac monitor upon transfer of care along with insulin and fluids.       Marilee Louise RN  05/07/19 3186

## 2019-05-07 NOTE — PROGRESS NOTES
Database complete. Medications reconciled. Care plans and education initiated. Wounds to bilateral groin area.

## 2019-05-07 NOTE — CONSULTS
Critical Care Admit/Consult Note         Patient Jose Cummings   MRN -  [id number]   Acct # - [de-identified]   - 1995      Date of Admission -  2019  8:33 AM  Date of evaluation -  2019/0213-A   Hospital Day - 0            ADMIT/CONSULT DETAILS     Reason for Admit/Consult   DKA    Consulting Nuvia Lantigua MD  Primary Care Physician - Darien Isaac,          HPI   The patient is a 26 y.o. female with significant past medical history of poorly controlled DM type 1 and recurrent STIs. She has at least 3 admissions over the past year for DKA. She has had multiple recent visits to the emergency department between 3/23/19 and today due to folliculitis/abcesses in the pubic region and sexually transmitted infections. She has been treated with bactrim and keflex and reports to being adherent to both of the prescriptions. On 19 she had an abscess drained in the L groin. Upon chart review there is no mention of treatment for STIs but there are multiple negative wt burke, gonorrhea cultures and chlamydia cultures. This morning, the patient had abdominal pain, felt extremely dehydrated, checked her sugar and noted it was \"very high\" and called an ambulance to bring her to the ED. She reports being compliant with her long acting and sliding scale meal time insulin regimen  Upon arrival her blood glucose was measured to be 498. B hydroxybutyrate was over 4.5. Lactic acid was 1.7, Corrected Na and K were WNL. Her anion gap was 36 and her WBC was 21.3. Her ABG was positive for metabolic acidosis with a pH of 7.1. She has remained afebrile, and tachycardic. She was treated with 10 units of insulin followed by an insulin drip, an continuous infusion of 250ml/hf or NS, sodium bicarb, and antibiotics (doxycycline, clindamycin, and rocephin).  She was admitted to the ICU for further management    Past Medical History         Diagnosis Date    Bleeding at insertion site 1/5/2018    Common femoral artery injury, right, initial encounter 1/5/2018    DM type 1 (diabetes mellitus, type 1) ([facility] [address]         Past Surgical History     History reviewed. No pertinent surgical history. Current Medications   Current Medications    sodium chloride flush  10 mL Intravenous BID    sodium chloride  15 mL/kg Intravenous Once    clindamycin (CLEOCIN) IV  900 mg Intravenous Once    doxycycline (VIBRAMYCIN) IV  100 mg Intravenous Once     sodium chloride flush, dextrose, potassium chloride, magnesium sulfate, sodium phosphate IVPB **OR** sodium phosphate IVPB **OR** sodium phosphate IVPB, dextrose 5% and 0.45% NaCl with KCl 20 mEq  IV Drips/Infusions   sodium chloride 250 mL/hr (05/07/19 1200)    insulin (HUMAN R) non-weight based infusion 13.2 Units/hr (05/07/19 1306)    dextrose 5% and 0.45% NaCl with KCl 20 mEq       Home Medications  Medications Prior to Admission: NOVOLOG FLEXPEN 100 UNIT/ML injection pen, Inject 4-10 Units into the skin 3 times daily (before meals) Take 4 units plus **Corrective Low Dose Algorithm** Glucose: Dose:              No Insulin 140-199 1 Unit 200-249 2 Units 250-299 3 Units 300-349 4 Units 350-399 5 Units Over 399 6 Units  ibuprofen (ADVIL;MOTRIN) 200 MG tablet, Take 800 mg by mouth every 6 hours as needed for Pain  insulin glargine (BASAGLAR KWIKPEN) 100 UNIT/ML injection pen, Inject 25 Units into the skin nightly (Patient taking differently: Inject 28 Units into the skin nightly )  acetone, urine, test strip, Use daily as directed if bs >250 x2 or illness  FREESTYLE LITE strip, USE AS DIRECTED UP TO 6 TIMES DAILY  BD PEN NEEDLE SHELBIE U/F 32G X 4 MM MISC, USE AS DIRECTED UP TO 6 TIMES A DAY    Diet/Nutrition   Diet NPO Effective Now    Allergies   Patient has no known allergies. Social History   Tobacco   reports that she has never smoked.  She has never used smokeless tobacco.    Alcohol     reports that she does not drink Hours)    Patient Vitals for the past 8 hrs:   BP Temp Temp src Pulse Resp SpO2 Weight   05/07/19 1302 -- 98 °F (36.7 °C) Axillary 133 22 -- --   05/07/19 1130 118/62 -- -- -- -- -- --   05/07/19 1115 (!) 145/59 -- -- 130 26 -- --   05/07/19 1100 -- -- -- 136 (!) 36 -- --   05/07/19 1045 -- -- -- 134 (!) 36 -- --   05/07/19 1041 -- -- -- -- -- -- 115 lb (52.2 kg)   05/07/19 1030 (!) 142/85 -- -- 143 (!) 35 96 % --   05/07/19 1015 -- -- -- 140 30 -- --   05/07/19 1000 -- -- -- 142 30 -- --   05/07/19 0945 (!) 142/85 98.4 °F (36.9 °C) -- 144 30 96 % --   05/07/19 0930 -- -- -- 144 (!) 31 -- --   05/07/19 0915 (!) 142/85 -- -- 141 27 -- --   05/07/19 0900 -- -- -- 136 25 -- --   05/07/19 0845 -- -- -- 136 28 -- --       Intake/Output Summary (Last 24 hours) at 5/7/2019 1324  Last data filed at 5/7/2019 1303  Gross per 24 hour   Intake --   Output 1400 ml   Net -1400 ml     No intake/output data recorded.    Date 05/07/19 0000 - 05/07/19 2359   Shift 4741-5008 1650-6609 1392-9452 24 Hour Total   INTAKE   Shift Total(mL/kg)       OUTPUT   Urine  1400  1400   Shift Total(mL/kg)  1400(26.8)  1400(26.8)   Weight (kg)  52.2 52.2 52.2     Patient Vitals for the past 96 hrs (Last 3 readings):   Weight   05/07/19 1041 115 lb (52.2 kg)     Drains/Tubes Outputs  de Bey 5/7/19  Exam         PHYSICAL EXAM:  CONSTITUTIONAL:  awake, alert, cooperative, no apparent distress, and appears stated age  EYES:  Lids and lashes normal, pupils equal, round and reactive to light, extra ocular muscles intact, sclera clear, conjunctiva normal  NECK:  supple, symmetrical, trachea midline  LUNGS:  No increased work of breathing, good air exchange, clear to auscultation bilaterally, no crackles or wheezing  CARDIOVASCULAR:  Normal apical impulse, tachycardic and rhythm, normal S1 and S2, no S3 or S4, and no murmur noted  ABDOMEN:  No scars, soft, tender in the suprapubic area, bladder markedly distended  GENITAL/URINARY:  External Genitalia: Abnormal findings: lesions present-folliculitis/cellulitis over the R pubis superior to the labia. Data   Old records and images have been reviewed    Lab Results   CBC     Lab Results   Component Value Date    WBC 21.3 05/07/2019    RBC 4.87 05/07/2019    HGB 13.8 05/07/2019    HCT 42.2 05/07/2019     05/07/2019    MCV 86.7 05/07/2019    MCH 28.3 05/07/2019    MCHC 32.7 05/07/2019    RDW 13.2 05/07/2019    NRBC 0.0 05/07/2019    SEGSPCT 58 09/29/2013    METASPCT 0.9 01/03/2018    LYMPHOPCT 15.7 05/07/2019    MONOPCT 4.3 05/07/2019    BASOPCT 2.6 05/07/2019    MONOSABS 0.85 05/07/2019    LYMPHSABS 3.41 05/07/2019    EOSABS 0.00 05/07/2019    BASOSABS 0.55 05/07/2019       BMP   Lab Results   Component Value Date     05/07/2019    K 5.0 05/07/2019    CL 95 05/07/2019    CO2 3 05/07/2019    BUN 14 05/07/2019    CREATININE 0.7 05/07/2019    GLUCOSE 627 05/07/2019    CALCIUM 10.1 05/07/2019       LFTS  Lab Results   Component Value Date    ALKPHOS 212 05/07/2019    ALT 19 05/07/2019    AST 20 05/07/2019    PROT 8.6 05/07/2019    BILITOT <0.2 05/07/2019    BILIDIR <0.2 05/07/2019    IBILI see below 05/07/2019    LABALBU 4.1 05/07/2019       INR  No results for input(s): PROTIME, INR in the last 72 hours. APTT  No results for input(s): APTT in the last 72 hours. Lactic Acid  Lab Results   Component Value Date    LACTA 1.7 05/07/2019    LACTA 1.1 03/23/2019    LACTA 1.9 02/10/2019        BNP   No results for input(s): BNP in the last 72 hours. Cultures     No results for input(s): BC in the last 72 hours. No results for input(s): Therman Lint in the last 72 hours. No results for input(s): LABURIN in the last 72 hours. Radiology     US NON OB TRANSVAGINAL   Final Result   Unremarkable study of the pelvis.          XR CHEST PORTABLE   Final Result   Normal chest and unchanged               CT ABDOMEN PELVIS WO CONTRAST   Final Result   Superficial skin thickening and contiguous infiltration/phlegmon   within the anterior subcutaneous fat in the pelvis. There is no   drainable abscess. Findings would suggest possible cellulitis. SYSTEMS ASSESSMENT    Neuro   Pain over area of infection  Percocet q6 prn for severe pain    Respiratory   Mild SOB, saturating well on RA    Cardiovascular   BP stable, tachycardia - sinus tach secondary to dka and pain   Monitor HR after pain medication       Gastrointestinal   NPO for time being  PRN zofran for nausea  Ulcer prophylaxis with protonix    Renal   Bey in place, monitor urine output  Cr=0.7 on admission  Electrolyte replacement PRN     Infectious Disease   Sepsis   Furuncles vs abscess vs folliculitis in the pubic region  Has been persistent for weeks, previously treated with keflex and bactrim  Afebrile  Questionable history of STIs  NAAT for gonorrhea and chlamydia. Wet mount negative  Doxy 100 mg BID  Bacitracin ointment over the affected area  Ct abdomen pelvis reviewed,     Hematology/Oncology   Hb stable  Leukocytosis-stress reaction + infection. Continue to monitor WBC count  DVT ppx w/ lovenox    Endocrine   DKA, type 1 DM  Insulin drip, glucose decreasing appropriately  Monitor BMP, especially K, replace as necessary. BMP q4h  Normotensive and corrected NA WNL, stopping fluids for now  If pt remains tachycardic after treating pain consider restarting fluids  PH>7, d/c bicarb    Social/Spiritual/DNR/Other   Full code  Patient refused central line in ED      I personally saw, examined and provided care for the patient. Radiographs, labs and medication list were reviewed by me independently. I spoke with bedside nursing, therapists and consultants. Critical care services and times documented are independent of procedures and multidisciplinary rounds with Residents. Additionally comprehensive, multidisciplinary rounds were conducted with the MICU team. The case was discussed in detail and plans for care were established.  Review of Residents documentation was conducted and revisions were made as appropriate. I agree with the above documented exam, problem list and plan of care. Abx   dka per protocol   Pain control     Jose David Davis M.D.    Pulmonary/Critical Care Medicine   36 min cct excluding procedures

## 2019-05-08 LAB
ANION GAP SERPL CALCULATED.3IONS-SCNC: 10 MMOL/L (ref 7–16)
ANION GAP SERPL CALCULATED.3IONS-SCNC: 11 MMOL/L (ref 7–16)
BASOPHILS ABSOLUTE: 0.03 E9/L (ref 0–0.2)
BASOPHILS RELATIVE PERCENT: 0.3 % (ref 0–2)
BUN BLDV-MCNC: 2 MG/DL (ref 6–20)
BUN BLDV-MCNC: 3 MG/DL (ref 6–20)
BUN BLDV-MCNC: 4 MG/DL (ref 6–20)
BUN BLDV-MCNC: 6 MG/DL (ref 6–20)
BURR CELLS: ABNORMAL
CALCIUM SERPL-MCNC: 8.1 MG/DL (ref 8.6–10.2)
CALCIUM SERPL-MCNC: 8.3 MG/DL (ref 8.6–10.2)
CALCIUM SERPL-MCNC: 8.4 MG/DL (ref 8.6–10.2)
CALCIUM SERPL-MCNC: 8.6 MG/DL (ref 8.6–10.2)
CHLORIDE BLD-SCNC: 101 MMOL/L (ref 98–107)
CHLORIDE BLD-SCNC: 105 MMOL/L (ref 98–107)
CHLORIDE BLD-SCNC: 107 MMOL/L (ref 98–107)
CHLORIDE BLD-SCNC: 107 MMOL/L (ref 98–107)
CO2: 17 MMOL/L (ref 22–29)
CO2: 18 MMOL/L (ref 22–29)
CREAT SERPL-MCNC: 0.5 MG/DL (ref 0.5–1)
EOSINOPHILS ABSOLUTE: 0.06 E9/L (ref 0.05–0.5)
EOSINOPHILS RELATIVE PERCENT: 0.6 % (ref 0–6)
GFR AFRICAN AMERICAN: >60
GFR NON-AFRICAN AMERICAN: >60 ML/MIN/1.73
GLUCOSE BLD-MCNC: 151 MG/DL (ref 74–99)
GLUCOSE BLD-MCNC: 151 MG/DL (ref 74–99)
GLUCOSE BLD-MCNC: 221 MG/DL (ref 74–99)
GLUCOSE BLD-MCNC: 234 MG/DL (ref 74–99)
HCT VFR BLD CALC: 30.9 % (ref 34–48)
HEMOGLOBIN: 10.2 G/DL (ref 11.5–15.5)
IMMATURE GRANULOCYTES #: 0.03 E9/L
IMMATURE GRANULOCYTES %: 0.3 % (ref 0–5)
LYMPHOCYTES ABSOLUTE: 2.01 E9/L (ref 1.5–4)
LYMPHOCYTES RELATIVE PERCENT: 21.4 % (ref 20–42)
MAGNESIUM: 1.6 MG/DL (ref 1.6–2.6)
MAGNESIUM: 1.7 MG/DL (ref 1.6–2.6)
MAGNESIUM: 2 MG/DL (ref 1.6–2.6)
MAGNESIUM: 2.1 MG/DL (ref 1.6–2.6)
MCH RBC QN AUTO: 28.3 PG (ref 26–35)
MCHC RBC AUTO-ENTMCNC: 33 % (ref 32–34.5)
MCV RBC AUTO: 85.8 FL (ref 80–99.9)
METER GLUCOSE: 125 MG/DL (ref 74–99)
METER GLUCOSE: 134 MG/DL (ref 74–99)
METER GLUCOSE: 134 MG/DL (ref 74–99)
METER GLUCOSE: 143 MG/DL (ref 74–99)
METER GLUCOSE: 145 MG/DL (ref 74–99)
METER GLUCOSE: 149 MG/DL (ref 74–99)
METER GLUCOSE: 152 MG/DL (ref 74–99)
METER GLUCOSE: 153 MG/DL (ref 74–99)
METER GLUCOSE: 158 MG/DL (ref 74–99)
METER GLUCOSE: 173 MG/DL (ref 74–99)
METER GLUCOSE: 175 MG/DL (ref 74–99)
METER GLUCOSE: 187 MG/DL (ref 74–99)
METER GLUCOSE: 198 MG/DL (ref 74–99)
METER GLUCOSE: 206 MG/DL (ref 74–99)
METER GLUCOSE: 220 MG/DL (ref 74–99)
METER GLUCOSE: 244 MG/DL (ref 74–99)
MONOCYTES ABSOLUTE: 0.68 E9/L (ref 0.1–0.95)
MONOCYTES RELATIVE PERCENT: 7.2 % (ref 2–12)
NEUTROPHILS ABSOLUTE: 6.57 E9/L (ref 1.8–7.3)
NEUTROPHILS RELATIVE PERCENT: 70.2 % (ref 43–80)
PDW BLD-RTO: 13 FL (ref 11.5–15)
PHOSPHORUS: 2 MG/DL (ref 2.5–4.5)
PHOSPHORUS: 2.3 MG/DL (ref 2.5–4.5)
PHOSPHORUS: 2.7 MG/DL (ref 2.5–4.5)
PHOSPHORUS: 2.8 MG/DL (ref 2.5–4.5)
PLATELET # BLD: 260 E9/L (ref 130–450)
PMV BLD AUTO: 10.8 FL (ref 7–12)
POIKILOCYTES: ABNORMAL
POLYCHROMASIA: ABNORMAL
POTASSIUM SERPL-SCNC: 4.1 MMOL/L (ref 3.5–5)
POTASSIUM SERPL-SCNC: 4.1 MMOL/L (ref 3.5–5)
POTASSIUM SERPL-SCNC: 4.4 MMOL/L (ref 3.5–5)
POTASSIUM SERPL-SCNC: 4.6 MMOL/L (ref 3.5–5)
RBC # BLD: 3.6 E12/L (ref 3.5–5.5)
SODIUM BLD-SCNC: 130 MMOL/L (ref 132–146)
SODIUM BLD-SCNC: 132 MMOL/L (ref 132–146)
SODIUM BLD-SCNC: 135 MMOL/L (ref 132–146)
SODIUM BLD-SCNC: 135 MMOL/L (ref 132–146)
WBC # BLD: 9.4 E9/L (ref 4.5–11.5)

## 2019-05-08 PROCEDURE — 84100 ASSAY OF PHOSPHORUS: CPT

## 2019-05-08 PROCEDURE — 2500000003 HC RX 250 WO HCPCS: Performed by: INTERNAL MEDICINE

## 2019-05-08 PROCEDURE — 83735 ASSAY OF MAGNESIUM: CPT

## 2019-05-08 PROCEDURE — 6370000000 HC RX 637 (ALT 250 FOR IP): Performed by: INTERNAL MEDICINE

## 2019-05-08 PROCEDURE — 36415 COLL VENOUS BLD VENIPUNCTURE: CPT

## 2019-05-08 PROCEDURE — 99232 SBSQ HOSP IP/OBS MODERATE 35: CPT | Performed by: INTERNAL MEDICINE

## 2019-05-08 PROCEDURE — 85025 COMPLETE CBC W/AUTO DIFF WBC: CPT

## 2019-05-08 PROCEDURE — 2580000003 HC RX 258: Performed by: STUDENT IN AN ORGANIZED HEALTH CARE EDUCATION/TRAINING PROGRAM

## 2019-05-08 PROCEDURE — 2580000003 HC RX 258: Performed by: EMERGENCY MEDICINE

## 2019-05-08 PROCEDURE — 1200000000 HC SEMI PRIVATE

## 2019-05-08 PROCEDURE — 2580000003 HC RX 258: Performed by: INTERNAL MEDICINE

## 2019-05-08 PROCEDURE — 6370000000 HC RX 637 (ALT 250 FOR IP): Performed by: STUDENT IN AN ORGANIZED HEALTH CARE EDUCATION/TRAINING PROGRAM

## 2019-05-08 PROCEDURE — 6360000002 HC RX W HCPCS: Performed by: INTERNAL MEDICINE

## 2019-05-08 PROCEDURE — 80048 BASIC METABOLIC PNL TOTAL CA: CPT

## 2019-05-08 PROCEDURE — 82962 GLUCOSE BLOOD TEST: CPT

## 2019-05-08 PROCEDURE — 6360000002 HC RX W HCPCS: Performed by: EMERGENCY MEDICINE

## 2019-05-08 PROCEDURE — 2500000003 HC RX 250 WO HCPCS: Performed by: STUDENT IN AN ORGANIZED HEALTH CARE EDUCATION/TRAINING PROGRAM

## 2019-05-08 PROCEDURE — 6370000000 HC RX 637 (ALT 250 FOR IP): Performed by: EMERGENCY MEDICINE

## 2019-05-08 PROCEDURE — 2500000003 HC RX 250 WO HCPCS: Performed by: EMERGENCY MEDICINE

## 2019-05-08 RX ORDER — ONDANSETRON 2 MG/ML
4 INJECTION INTRAMUSCULAR; INTRAVENOUS EVERY 6 HOURS PRN
Status: DISCONTINUED | OUTPATIENT
Start: 2019-05-08 | End: 2019-05-13 | Stop reason: HOSPADM

## 2019-05-08 RX ORDER — INSULIN GLARGINE 100 [IU]/ML
28 INJECTION, SOLUTION SUBCUTANEOUS NIGHTLY
Status: DISCONTINUED | OUTPATIENT
Start: 2019-05-08 | End: 2019-05-09

## 2019-05-08 RX ORDER — DEXTROSE MONOHYDRATE 50 MG/ML
100 INJECTION, SOLUTION INTRAVENOUS PRN
Status: DISCONTINUED | OUTPATIENT
Start: 2019-05-08 | End: 2019-05-13 | Stop reason: HOSPADM

## 2019-05-08 RX ORDER — NICOTINE POLACRILEX 4 MG
15 LOZENGE BUCCAL PRN
Status: DISCONTINUED | OUTPATIENT
Start: 2019-05-08 | End: 2019-05-13 | Stop reason: HOSPADM

## 2019-05-08 RX ORDER — LIDOCAINE HYDROCHLORIDE AND EPINEPHRINE 10; 10 MG/ML; UG/ML
20 INJECTION, SOLUTION INFILTRATION; PERINEURAL SEE ADMIN INSTRUCTIONS
Status: DISCONTINUED | OUTPATIENT
Start: 2019-05-08 | End: 2019-05-13 | Stop reason: HOSPADM

## 2019-05-08 RX ORDER — ACETAMINOPHEN 325 MG/1
650 TABLET ORAL EVERY 4 HOURS PRN
Status: DISCONTINUED | OUTPATIENT
Start: 2019-05-08 | End: 2019-05-13 | Stop reason: HOSPADM

## 2019-05-08 RX ORDER — DEXTROSE MONOHYDRATE 25 G/50ML
12.5 INJECTION, SOLUTION INTRAVENOUS PRN
Status: DISCONTINUED | OUTPATIENT
Start: 2019-05-08 | End: 2019-05-13 | Stop reason: HOSPADM

## 2019-05-08 RX ORDER — 0.9 % SODIUM CHLORIDE 0.9 %
1000 INTRAVENOUS SOLUTION INTRAVENOUS ONCE
Status: COMPLETED | OUTPATIENT
Start: 2019-05-08 | End: 2019-05-09

## 2019-05-08 RX ORDER — TRAMADOL HYDROCHLORIDE 50 MG/1
50 TABLET ORAL EVERY 6 HOURS PRN
Status: DISCONTINUED | OUTPATIENT
Start: 2019-05-08 | End: 2019-05-11

## 2019-05-08 RX ORDER — MORPHINE SULFATE 2 MG/ML
1 INJECTION, SOLUTION INTRAMUSCULAR; INTRAVENOUS ONCE
Status: COMPLETED | OUTPATIENT
Start: 2019-05-08 | End: 2019-05-08

## 2019-05-08 RX ORDER — SODIUM CHLORIDE 9 MG/ML
INJECTION, SOLUTION INTRAVENOUS CONTINUOUS
Status: DISCONTINUED | OUTPATIENT
Start: 2019-05-09 | End: 2019-05-10

## 2019-05-08 RX ADMIN — POTASSIUM CHLORIDE 10 MEQ: 10 INJECTION, SOLUTION INTRAVENOUS at 02:09

## 2019-05-08 RX ADMIN — BACITRACIN: 500 OINTMENT TOPICAL at 08:23

## 2019-05-08 RX ADMIN — DOXYCYCLINE 100 MG: 100 INJECTION, POWDER, LYOPHILIZED, FOR SOLUTION INTRAVENOUS at 14:55

## 2019-05-08 RX ADMIN — Medication 10 ML: at 08:23

## 2019-05-08 RX ADMIN — OXYCODONE HYDROCHLORIDE AND ACETAMINOPHEN 1 TABLET: 5; 325 TABLET ORAL at 09:12

## 2019-05-08 RX ADMIN — POTASSIUM CHLORIDE 10 MEQ: 10 INJECTION, SOLUTION INTRAVENOUS at 03:10

## 2019-05-08 RX ADMIN — OXYCODONE HYDROCHLORIDE AND ACETAMINOPHEN 1 TABLET: 5; 325 TABLET ORAL at 03:09

## 2019-05-08 RX ADMIN — POTASSIUM CHLORIDE, DEXTROSE MONOHYDRATE AND SODIUM CHLORIDE: 150; 5; 450 INJECTION, SOLUTION INTRAVENOUS at 04:15

## 2019-05-08 RX ADMIN — SODIUM PHOSPHATE, MONOBASIC, MONOHYDRATE 15 MMOL: 276; 142 INJECTION, SOLUTION INTRAVENOUS at 01:39

## 2019-05-08 RX ADMIN — POTASSIUM CHLORIDE 10 MEQ: 10 INJECTION, SOLUTION INTRAVENOUS at 05:15

## 2019-05-08 RX ADMIN — Medication 10 ML: at 20:47

## 2019-05-08 RX ADMIN — SODIUM PHOSPHATE, MONOBASIC, MONOHYDRATE 10 MMOL: 276; 142 INJECTION, SOLUTION INTRAVENOUS at 09:57

## 2019-05-08 RX ADMIN — POTASSIUM CHLORIDE 10 MEQ: 10 INJECTION, SOLUTION INTRAVENOUS at 06:19

## 2019-05-08 RX ADMIN — OXYCODONE HYDROCHLORIDE AND ACETAMINOPHEN 1 TABLET: 5; 325 TABLET ORAL at 19:53

## 2019-05-08 RX ADMIN — TRAMADOL HYDROCHLORIDE 50 MG: 50 TABLET, FILM COATED ORAL at 05:18

## 2019-05-08 RX ADMIN — DOXYCYCLINE 100 MG: 100 INJECTION, POWDER, LYOPHILIZED, FOR SOLUTION INTRAVENOUS at 02:57

## 2019-05-08 RX ADMIN — ACETAMINOPHEN 325 MG: 325 TABLET ORAL at 20:47

## 2019-05-08 RX ADMIN — INSULIN LISPRO 2 UNITS: 100 INJECTION, SOLUTION INTRAVENOUS; SUBCUTANEOUS at 17:42

## 2019-05-08 RX ADMIN — INSULIN LISPRO 1 UNITS: 100 INJECTION, SOLUTION INTRAVENOUS; SUBCUTANEOUS at 20:59

## 2019-05-08 RX ADMIN — POTASSIUM CHLORIDE, DEXTROSE MONOHYDRATE AND SODIUM CHLORIDE: 150; 5; 450 INJECTION, SOLUTION INTRAVENOUS at 11:17

## 2019-05-08 RX ADMIN — POTASSIUM CHLORIDE 10 MEQ: 10 INJECTION, SOLUTION INTRAVENOUS at 10:29

## 2019-05-08 RX ADMIN — INSULIN GLARGINE 28 UNITS: 100 INJECTION, SOLUTION SUBCUTANEOUS at 11:01

## 2019-05-08 RX ADMIN — SODIUM CHLORIDE 1000 ML: 9 INJECTION, SOLUTION INTRAVENOUS at 20:52

## 2019-05-08 RX ADMIN — SODIUM CHLORIDE 2.94 UNITS/HR: 9 INJECTION, SOLUTION INTRAVENOUS at 06:04

## 2019-05-08 RX ADMIN — POTASSIUM CHLORIDE 10 MEQ: 10 INJECTION, SOLUTION INTRAVENOUS at 01:09

## 2019-05-08 RX ADMIN — MORPHINE SULFATE 1 MG: 2 INJECTION, SOLUTION INTRAMUSCULAR; INTRAVENOUS at 00:54

## 2019-05-08 RX ADMIN — TRAMADOL HYDROCHLORIDE 50 MG: 50 TABLET, FILM COATED ORAL at 22:36

## 2019-05-08 RX ADMIN — POTASSIUM CHLORIDE 10 MEQ: 10 INJECTION, SOLUTION INTRAVENOUS at 09:17

## 2019-05-08 RX ADMIN — ONDANSETRON 4 MG: 2 INJECTION INTRAMUSCULAR; INTRAVENOUS at 20:47

## 2019-05-08 RX ADMIN — BACITRACIN: 500 OINTMENT TOPICAL at 14:55

## 2019-05-08 RX ADMIN — INSULIN LISPRO 4 UNITS: 100 INJECTION, SOLUTION INTRAVENOUS; SUBCUTANEOUS at 12:04

## 2019-05-08 ASSESSMENT — PAIN DESCRIPTION - FREQUENCY
FREQUENCY: CONTINUOUS

## 2019-05-08 ASSESSMENT — PAIN SCALES - GENERAL
PAINLEVEL_OUTOF10: 10
PAINLEVEL_OUTOF10: 0
PAINLEVEL_OUTOF10: 0
PAINLEVEL_OUTOF10: 4
PAINLEVEL_OUTOF10: 10
PAINLEVEL_OUTOF10: 8
PAINLEVEL_OUTOF10: 10
PAINLEVEL_OUTOF10: 10
PAINLEVEL_OUTOF10: 0
PAINLEVEL_OUTOF10: 10
PAINLEVEL_OUTOF10: 0
PAINLEVEL_OUTOF10: 0

## 2019-05-08 ASSESSMENT — PAIN DESCRIPTION - DESCRIPTORS
DESCRIPTORS: DISCOMFORT;JABBING
DESCRIPTORS: CONSTANT;DISCOMFORT;BURNING
DESCRIPTORS: BURNING;CONSTANT;DISCOMFORT
DESCRIPTORS: DISCOMFORT;ACHING
DESCRIPTORS: BURNING;CONSTANT;DISCOMFORT
DESCRIPTORS: DISCOMFORT
DESCRIPTORS: DISCOMFORT;JABBING
DESCRIPTORS: DISCOMFORT
DESCRIPTORS: DISCOMFORT;BURNING

## 2019-05-08 ASSESSMENT — PAIN DESCRIPTION - ONSET
ONSET: ON-GOING

## 2019-05-08 ASSESSMENT — PAIN DESCRIPTION - PROGRESSION
CLINICAL_PROGRESSION: NOT CHANGED
CLINICAL_PROGRESSION: NOT CHANGED
CLINICAL_PROGRESSION: GRADUALLY IMPROVING
CLINICAL_PROGRESSION: NOT CHANGED
CLINICAL_PROGRESSION: GRADUALLY WORSENING
CLINICAL_PROGRESSION: NOT CHANGED

## 2019-05-08 ASSESSMENT — PAIN DESCRIPTION - ORIENTATION
ORIENTATION: ANTERIOR
ORIENTATION: MID
ORIENTATION: LEFT;RIGHT

## 2019-05-08 ASSESSMENT — PAIN DESCRIPTION - LOCATION
LOCATION: PELVIS

## 2019-05-08 ASSESSMENT — PAIN DESCRIPTION - PAIN TYPE
TYPE: ACUTE PAIN

## 2019-05-08 ASSESSMENT — PAIN - FUNCTIONAL ASSESSMENT
PAIN_FUNCTIONAL_ASSESSMENT: PREVENTS OR INTERFERES SOME ACTIVE ACTIVITIES AND ADLS
PAIN_FUNCTIONAL_ASSESSMENT: PREVENTS OR INTERFERES WITH MANY ACTIVE NOT PASSIVE ACTIVITIES
PAIN_FUNCTIONAL_ASSESSMENT: PREVENTS OR INTERFERES WITH MANY ACTIVE NOT PASSIVE ACTIVITIES

## 2019-05-08 ASSESSMENT — PAIN DESCRIPTION - DIRECTION
RADIATING_TOWARDS: STOMACH

## 2019-05-08 NOTE — PROGRESS NOTES
Critical Care Team - Daily Progress Note      Date and time: 5/8/2019 3:38 PM  Patient's name:  Ruel Salinas  Medical Record Number: [id number]  Patient's account/billing number: [de-identified]  Patient's YOB: 1995  Age: 26 y.o. Date of Admission: 5/7/2019  8:33 AM  Length of stay during current admission: 1      Primary Care Physician: Manjinder Richards DO  ICU Attending Physician: Dr. Davis Lindo Status: Prior    Reason for ICU admission: DKA      SUBJECTIVE:     OVERNIGHT EVENTS:      No acute events overnight. Patient report mild nausea and continued but improved pain over the suprapubic area. Tolerating diet.  Denies CP or SOB    AWAKE & FOLLOWING COMMANDS:  [] No   [x] Yes    CURRENT VENTILATION STATUS:     [] Ventilator  [] BIPAP  [] Nasal Cannula [x] Room Air      IF INTUBATED, ET TUBE MARKING AT LOWER LIP:       cms    SECRETIONS Amount:  [] Small [] Moderate  [] Large  [x] None  Color:     [] White [] Colored  [] Bloody    SEDATION:  RAAS Score:  [] Propofol gtt  [] Versed gtt  [] Ativan gtt   [x] No Sedation    PARALYZED:  [x] No    [] Yes    DIARRHEA:                [x] No                [] Yes  (C. Difficile status: [] positive                                                                                                                       [] negative                                                                                                                     [] pending)    VASOPRESSORS:  [x] No    [] Yes    If yes -   [] Levophed       [] Dopamine     [] Vasopressin       [] Dobutamine  [] Phenylephrine         [] Epinephrine    CENTRAL LINES:     [x] No   [] Yes   (Date of Insertion:   )           If yes -     [] Right IJ     [] Left IJ [] Right Femoral [] Left Femoral                   [] Right Subclavian [] Left Subclavian       CROFT'S CATHETER:   [] No   [x] Yes  (Date of Insertion: 5/7/29 )     URINE OUTPUT:            [x] Good   [] Low              [] Anuric      OBJECTIVE:     VITAL SIGNS:  /80   Pulse 116   Temp 98.7 °F (37.1 °C) (Oral)   Resp 15   Ht 5' 1\" (1.549 m)   Wt 130 lb 11.7 oz (59.3 kg)   LMP 2019   SpO2 100%   BMI 24.70 kg/m²   Tmax over 24 hours:  Temp (24hrs), Av.6 °F (37 °C), Min:97.9 °F (36.6 °C), Max:99.4 °F (37.4 °C)      Patient Vitals for the past 6 hrs:   Pulse Resp   19 1500 116 15   19 1400 119 12   19 1300 118 9         Intake/Output Summary (Last 24 hours) at 2019 1538  Last data filed at 2019 1356  Gross per 24 hour   Intake 4005.2 ml   Output 1125 ml   Net 2880.2 ml     Wt Readings from Last 2 Encounters:   19 130 lb 11.7 oz (59.3 kg)   19 115 lb (52.2 kg)     Body mass index is 24.7 kg/m². PHYSICAL EXAMINATION:    General appearance - alert, well appearing, and in no distress  Mental status - alert, oriented to person, place, and time  Eyes - sclera anicteric  Mouth - mucous membranes moist, pharynx normal without lesions  Neck - supple, no significant adenopathy  Chest - clear to auscultation, no wheezes, rales or rhonchi, symmetric air entry  Heart - normal rate, regular rhythm, normal S1, S2, no murmurs, rubs, clicks or gallops  Abdomen - tenderness noted in the suprapubic region. lesions persist over the suprapubic region with induration and drainage but no fluctuance.    Neurological - alert, oriented, normal speech, no focal findings or movement disorder noted}  Extremities - peripheral pulses normal, no pedal edema, no clubbing or cyanosis  Skin - normal coloration and turgor, no rashes, no suspicious skin lesions noted except for suprapubic lesions mentioned above        MEDICATIONS:    Scheduled Meds:   insulin glargine  28 Units Subcutaneous Nightly    insulin lispro  0-12 Units Subcutaneous TID WC    insulin lispro  0-6 Units Subcutaneous Nightly    sodium chloride flush  10 mL Intravenous BID    bacitracin   Topical TID    enoxaparin  40 mg Subcutaneous Daily    doxycycline (VIBRAMYCIN) IV  100 mg Intravenous Q12H     Continuous Infusions:   dextrose       PRN Meds:     traMADol 50 mg Q6H PRN   glucose 15 g PRN   dextrose 12.5 g PRN   glucagon (rDNA) 1 mg PRN   dextrose 100 mL/hr PRN   sodium chloride flush 10 mL PRN   oxyCODONE-acetaminophen 1 tablet Q6H PRN         VENT SETTINGS (Comprehensive) (if applicable): Additional Respiratory  Assessments  Pulse: 116  Resp: 15  SpO2: 100 %  Oral Care: Mouth moisturizer    ABGs:   PH=7.18, PCO2<15, NE5=083  Laboratory findings:    Complete Blood Count:   Recent Labs     05/07/19  0954 05/08/19 0409   WBC 21.3* 9.4   HGB 13.8 10.2*   HCT 42.2 30.9*    260        Last 3 Blood Glucose:   Recent Labs     05/08/19  0409 05/08/19  0820 05/08/19  1404   GLUCOSE 221* 151* 234*        PT/INR:    Lab Results   Component Value Date    PROTIME 11.6 02/10/2019    INR 1.0 02/10/2019     PTT:    Lab Results   Component Value Date    APTT 35.3 02/10/2019       Comprehensive Metabolic Profile:   Recent Labs     05/07/19  0954  05/08/19 0409 05/08/19  0820 05/08/19  1404      < > 135 132 130*   K 5.0   < > 4.6 4.1 4.4   CL 95*   < > 107 105 101   CO2 3*   < > 17* 17* 18*   BUN 14   < > 4* 3* 2*   CREATININE 0.7   < > 0.5 0.5 0.5   GLUCOSE 627*   < > 221* 151* 234*   CALCIUM 10.1   < > 8.1* 8.4* 8.6   PROT 8.6*  --   --   --   --    LABALBU 4.1  --   --   --   --    BILITOT <0.2  --   --   --   --    ALKPHOS 212*  --   --   --   --    AST 20  --   --   --   --    ALT 19  --   --   --   --     < > = values in this interval not displayed.       Magnesium:   Lab Results   Component Value Date    MG 1.6 05/08/2019     Phosphorus:   Lab Results   Component Value Date    PHOS 2.7 05/08/2019     Ionized Calcium: No results found for: DOMENICO     Urinalysis:  Positive for glucose and ketones on 5/7/19, no sings of infection    Troponin: No results for input(s): TROPONINI in the last 72 hours.    Microbiology:    Cultures during this admission:     Blood cultures:                 [] None drawn      [x] Negative             []  Positive (Details:  )  Urine Culture:                   [] None drawn      [x] Negative             []  Positive (Details:  )  Sputum Culture:               [x] None drawn       [] Negative             []  Positive (Details:  )   Endotracheal aspirate:     [x] None drawn       [] Negative             []  Positive (Details:  )     Other pertinent Labs:       Radiology/Imaging:     US NON OB TRANSVAGINAL   Final Result   Unremarkable study of the pelvis. XR CHEST PORTABLE   Final Result   Normal chest and unchanged               CT ABDOMEN PELVIS WO CONTRAST   Final Result   Superficial skin thickening and contiguous infiltration/phlegmon   within the anterior subcutaneous fat in the pelvis. There is no   drainable abscess. Findings would suggest possible cellulitis. ASSESSMENT:       Neuro   Pain over area of infection  Percocet q6 prn for severe pain     Respiratory   Saturating well on RA     Cardiovascular   BP stable, tachycardia - sinus tach secondary to dka and pain   Tachycardia responsive to pain, infection is also present      Gastrointestinal   NPO for time being  PRN zofran for nausea  Ulcer prophylaxis with protonix  Diabetic diet     Renal   Bey in place, monitor urine output  Cr stable  Electrolyte replacement PRN     Infectious Disease   Sepsis   Furuncles vs abscess vs folliculitis in the pubic region  Has been persistent for weeks, previously treated with keflex and bactrim  Remains afebrile  Questionable history of STIs  NAAT for gonorrhea and chlamydia negative  Doxy 100 mg BID day 2  Bacitracin ointment over the affected area  Ct abdomen pelvis reviewed      Hematology/Oncology   Hb stable  Leukocytosis resolved  DVT ppx w/ lovenox     Endocrine   DKA, type 1 DM.  A1c=14  Insulin drip discontinued, sub q initiated, gap remains closed  Monitor BMP, especially K, replace as necessary. BMP q4h. Most recent anion gap=11. Glucose labile but has remained below 250       Social/Spiritual/DNR/Other   Full code  Patient refused central line in ED  OK for transfer out of ICU      Terrance Myers M.D.                       5/8/2019, 3:38 PM     I personally saw, examined and provided care for the patient. Radiographs, labs and medication list were reviewed by me independently. I spoke with bedside nursing, therapists and consultants. Critical care services and times documented are independent of procedures and multidisciplinary rounds with Residents. Additionally comprehensive, multidisciplinary rounds were conducted with the MICU team. The case was discussed in detail and plans for care were established. Review of Residents documentation was conducted and revisions were made as appropriate. I agree with the above documented exam, problem list and plan of care. Transition to sc insulin   Pain control   Abx   Pauly Patterson M.D.    Pulmonary/Critical Care Medicine   34min cct excluding procedures

## 2019-05-08 NOTE — CONSULTS
No Known Allergies    Family History   Problem Relation Age of Onset    Diabetes Maternal Grandmother     Diabetes Paternal Grandmother     Stroke Other     Thyroid Disease Neg Hx        Social History     Tobacco Use    Smoking status: Never Smoker    Smokeless tobacco: Never Used   Substance Use Topics    Alcohol use: No    Drug use: No         Review of Systems   General ROS: negative for - chills, fatigue or fever  Hematological and Lymphatic ROS: negative for - fatigue, jaundice, night sweats or pallor  Respiratory ROS: no cough, shortness of breath, or wheezing  Cardiovascular ROS: no chest pain or dyspnea on exertion  Gastrointestinal ROS: no abdominal pain, change in bowel habits, or black or bloody stools  Genito-Urinary ROS: no dysuria, trouble voiding, or hematuria  Musculoskeletal ROS: negative for - joint swelling, muscle pain or muscular weakness      PHYSICAL EXAM:    Vitals:    05/08/19 1615   BP: 120/86   Pulse: 115   Resp: 16   Temp: 98.5 °F (36.9 °C)   SpO2: 99%       General: NAD, awake and alert. Head: Normocephalic, atraumatic  Eyes: PERRLA, EOMI. Lungs: No increased work of breathing. Cardiovascular: RRR. Abdomen: Soft, ND, NT. No rebound, guarding or rigidity. : Three areas of fluctuance with draining sinus. Purulent material expressed. Surrounding erythema. No crepitus. in the suprapubic region   Extremities: Atraumatic, full range of motion  Skin: Warm, dry and intact    LABS:  CBC  Recent Labs     05/08/19  0409   WBC 9.4   HGB 10.2*   HCT 30.9*        BMP  Recent Labs     05/08/19  1404   *   K 4.4      CO2 18*   BUN 2*   CREATININE 0.5   CALCIUM 8.6     Liver Function  Recent Labs     05/07/19  0954   LIPASE 6*   BILITOT <0.2   BILIDIR <0.2   AST 20   ALT 19   ALKPHOS 212*   PROT 8.6*   LABALBU 4.1     No results for input(s): LACTATE in the last 72 hours. No results for input(s): INR, PTT in the last 72 hours.     Invalid input(s): PT    RADIOLOGY  Ct Abdomen Pelvis Wo Contrast    Result Date: 2019  Patient MRN:  [id number] : 1995 Age: 26 years Gender: Female Order Date:  2019 10:30 AM EXAM: CT ABDOMEN PELVIS WO CONTRAST Technique: Low-dose CT  acquisition technique included one of following options; 1 . Automated exposure control, 2. Adjustment of MA and or KV according to patient's size. 3. Use of interactive reconstruction. Dosage: 619.8 mGy-cm. INDICATION:  subcutaneous pelvic abscess on suprapubic area  COMPARISON: 2/10/2019 FINDINGS:  Lung bases are normal. Liver, spleen, pancreas, and both kidneys are normal on the noncontrast images. No abdominal or pelvic lymphadenopathy or fluid collections are noted. There may be small bilateral ovarian cysts. There is a Bey catheter in the bladder. There is superficial infiltration and phlegmon type density in the anterior subcutaneous fat in the pelvis. There does appear to be some skin thickening. There is no drainable abscess identified. Superficial skin thickening and contiguous infiltration/phlegmon within the anterior subcutaneous fat in the pelvis. There is no drainable abscess. Findings would suggest possible cellulitis. Us Non Ob Transvaginal    Result Date: 2019  Patient MRN:  [id number] : 1995 Age: 26 years Gender: Female Order Date:  2019 11:15 AM EXAM: US NON OB TRANSVAGINAL NUMBER OF IMAGES:  28 INDICATION:  vaginal discharge COMPARISON: 2018 TECHNIQUE: Grayscale and color Doppler ultrasound of the pelvis was performed. FINDINGS: The uterus measures 6.4 x 2.7 x 2.9 cm. The endometrium measures 0.2 cm in thickness. The right ovary measures  2.6 x 2.5 x 2.0 cm. The left ovary measures 2.8 x 2.0 x 1.7 cm. Vascular flow is confirmed within the ovaries with color Doppler. Trace free fluid is visualized. Unremarkable study of the pelvis.      Xr Chest Portable    Result Date: 2019  Patient MRN:  [id number] : 1995 Age: 23 years Gender: Female Order Date:  5/7/2019 10:30 AM EXAM: XR CHEST PORTABLE INDICATION:  dka dka COMPARISON: 3/23/2019 FINDINGS:  Heart size is normal. There are no infiltrates or effusions. Normal chest and unchanged         ASSESSMENT:  26 y.o. female with suprapubic abscess's, draining sinuses and cellulitis     PLAN:  Plan for I&D in OR 5/9   NPO  IVF's  Abx's - doxycycline per primary team    D/w Dr. Rodrigues Medico    Electronically signed by Sadie Bearden MD on 5/8/19 at 6:03 PM      Attending Note:    Patient seen/examined. Agree with above assessment and plan. 27 yo female with DKA and Suprapubic abscess. Recommend I&D. Risks, benefits, alteratives (and risks of alternatives) of the procedure were discussed with patient at bedside, all questions answered. She understands and agrees to proceed.

## 2019-05-08 NOTE — PROGRESS NOTES
Dr. Darnell Dubois notified of new consult via perfect serve/telephone call. Instructed to notify surgical resident of new consult. Surgical resident notified via perfect serve at [phone].

## 2019-05-08 NOTE — CARE COORDINATION
Met w/ patient. Explained role of . Lives w/ grandmother in a 1 story house. Independent PTA however patient does not drive-depends on mother and grandmother for rides to [email or web address]- [facility] to dr. Krystal Bains if needed if given 48hr notice-pt aware. [facility] has glucometer and all necessary diabetic testing supplies and insulin- Hospitals in [place] test BS 4 x /day. [facility] is not interested in diabetic education classes. Currently on iv abx.  Will continue to follow for discharge needs Marshall Gibson

## 2019-05-08 NOTE — PROGRESS NOTES
Pulmonary/Chest: clear to auscultation bilaterally- no wheezes, rales or rhonchi, normal air movement, no respiratory distress  Cardiovascular: normal rate, normal S1 and S2, no gallops, intact distal pulses and no carotid bruits  Abdomen: soft, non-tender, non-distended, normal bowel sounds, no masses or organomegaly    Local Exam  Induration + / Tender  Recent Labs     05/08/19  0409 05/08/19  0820 05/08/19  1404    132 130*   K 4.6 4.1 4.4    105 101   CO2 17* 17* 18*   BUN 4* 3* 2*   CREATININE 0.5 0.5 0.5   GLUCOSE 221* 151* 234*   CALCIUM 8.1* 8.4* 8.6       Recent Labs     05/07/19  0954 05/08/19  0409   WBC 21.3* 9.4   RBC 4.87 3.60   HGB 13.8 10.2*   HCT 42.2 30.9*   MCV 86.7 85.8   MCH 28.3 28.3   MCHC 32.7 33.0   RDW 13.2 13.0    260   MPV 11.5 10.8         Radiology:   US NON OB TRANSVAGINAL   Final Result   Unremarkable study of the pelvis. XR CHEST PORTABLE   Final Result   Normal chest and unchanged               CT ABDOMEN PELVIS WO CONTRAST   Final Result   Superficial skin thickening and contiguous infiltration/phlegmon   within the anterior subcutaneous fat in the pelvis. There is no   drainable abscess. Findings would suggest possible cellulitis. Assessment:    Active Problems:    DKA, type 1, not at goal ([address]    Sepsis ([address]    Bacterial folliculitis  Resolved Problems:    * No resolved hospital problems. *      Plan:  1. DKA improved  2. Leucocytosis / Improved  3. Folliculitis / early abscess / Needs I & D, surgery consulted. 4. Remains on Doxycycline. 5. Sheldon CARSON has been resumed.         Electronically signed by Cesar Lara MD on 5/8/2019 at 3:25 PM

## 2019-05-08 NOTE — PLAN OF CARE
Problem: Pain:  Goal: Pain level will decrease  Description  Pain level will decrease     5/7/2019 2029 by Kane Cueto RN  Outcome: Met This Shift  5/7/2019 1230 by Son Strickland RN  Outcome: Met This Shift     Problem: Infection, Sepsis  Goal: Patient exhibits no signs of infection  5/7/2019 2029 by Kane Cueto RN  Outcome: Met This Shift  5/7/2019 1230 by Son Strickland RN  Outcome: Ongoing     Problem: Falls - Risk of:  Goal: Will remain free from falls  Description  Will remain free from falls  Outcome: Met This Shift  Goal: Absence of physical injury  Description  Absence of physical injury  Outcome: Met This Shift     Problem: Skin Integrity:  Goal: Will show no infection signs and symptoms  Description  Will show no infection signs and symptoms  Outcome: Met This Shift  Goal: Absence of new skin breakdown  Description  Absence of new skin breakdown  Outcome: Met This Shift

## 2019-05-08 NOTE — PROGRESS NOTES
Report called to [address] [place] [address] on [address] for patient to be transferred to room 548. All pertinent information disclosed during nurse to nurse report. Patient previously notified of upcoming transfer to room 548. Condition stable at this time. Awaiting transport.

## 2019-05-09 ENCOUNTER — ANESTHESIA (OUTPATIENT)
Dept: OPERATING ROOM | Age: 24
DRG: 710 | End: 2019-05-09
Payer: COMMERCIAL

## 2019-05-09 ENCOUNTER — ANESTHESIA EVENT (OUTPATIENT)
Dept: OPERATING ROOM | Age: 24
DRG: 710 | End: 2019-05-09
Payer: COMMERCIAL

## 2019-05-09 VITALS — OXYGEN SATURATION: 99 % | TEMPERATURE: 98.1 F | SYSTOLIC BLOOD PRESSURE: 105 MMHG | DIASTOLIC BLOOD PRESSURE: 53 MMHG

## 2019-05-09 LAB
ANION GAP SERPL CALCULATED.3IONS-SCNC: 10 MMOL/L (ref 7–16)
BASOPHILS ABSOLUTE: 0.04 E9/L (ref 0–0.2)
BASOPHILS RELATIVE PERCENT: 0.4 % (ref 0–2)
BUN BLDV-MCNC: 4 MG/DL (ref 6–20)
CALCIUM SERPL-MCNC: 8.5 MG/DL (ref 8.6–10.2)
CHLORIDE BLD-SCNC: 104 MMOL/L (ref 98–107)
CO2: 20 MMOL/L (ref 22–29)
CREAT SERPL-MCNC: 0.6 MG/DL (ref 0.5–1)
EOSINOPHILS ABSOLUTE: 0.06 E9/L (ref 0.05–0.5)
EOSINOPHILS RELATIVE PERCENT: 0.5 % (ref 0–6)
GFR AFRICAN AMERICAN: >60
GFR NON-AFRICAN AMERICAN: >60 ML/MIN/1.73
GLUCOSE BLD-MCNC: 152 MG/DL (ref 74–99)
HCT VFR BLD CALC: 30 % (ref 34–48)
HEMOGLOBIN: 10.2 G/DL (ref 11.5–15.5)
IMMATURE GRANULOCYTES #: 0.06 E9/L
IMMATURE GRANULOCYTES %: 0.5 % (ref 0–5)
LYMPHOCYTES ABSOLUTE: 2.09 E9/L (ref 1.5–4)
LYMPHOCYTES RELATIVE PERCENT: 18.5 % (ref 20–42)
MAGNESIUM: 1.5 MG/DL (ref 1.6–2.6)
MCH RBC QN AUTO: 28.3 PG (ref 26–35)
MCHC RBC AUTO-ENTMCNC: 34 % (ref 32–34.5)
MCV RBC AUTO: 83.3 FL (ref 80–99.9)
METER GLUCOSE: 167 MG/DL (ref 74–99)
METER GLUCOSE: 171 MG/DL (ref 74–99)
METER GLUCOSE: 188 MG/DL (ref 74–99)
METER GLUCOSE: 198 MG/DL (ref 74–99)
METER GLUCOSE: 201 MG/DL (ref 74–99)
METER GLUCOSE: 210 MG/DL (ref 74–99)
METER GLUCOSE: 232 MG/DL (ref 74–99)
MONOCYTES ABSOLUTE: 1.09 E9/L (ref 0.1–0.95)
MONOCYTES RELATIVE PERCENT: 9.6 % (ref 2–12)
MRSA CULTURE ONLY: NORMAL
NEUTROPHILS ABSOLUTE: 7.98 E9/L (ref 1.8–7.3)
NEUTROPHILS RELATIVE PERCENT: 70.5 % (ref 43–80)
PDW BLD-RTO: 12.7 FL (ref 11.5–15)
PHOSPHORUS: 3.2 MG/DL (ref 2.5–4.5)
PLATELET # BLD: 260 E9/L (ref 130–450)
PMV BLD AUTO: 10.2 FL (ref 7–12)
POTASSIUM SERPL-SCNC: 4.1 MMOL/L (ref 3.5–5)
RBC # BLD: 3.6 E12/L (ref 3.5–5.5)
SODIUM BLD-SCNC: 134 MMOL/L (ref 132–146)
TOXIC GRANULATION: ABNORMAL
WBC # BLD: 11.3 E9/L (ref 4.5–11.5)

## 2019-05-09 PROCEDURE — 6370000000 HC RX 637 (ALT 250 FOR IP): Performed by: INTERNAL MEDICINE

## 2019-05-09 PROCEDURE — 6370000000 HC RX 637 (ALT 250 FOR IP): Performed by: SURGERY

## 2019-05-09 PROCEDURE — 87102 FUNGUS ISOLATION CULTURE: CPT

## 2019-05-09 PROCEDURE — 87015 SPECIMEN INFECT AGNT CONCNTJ: CPT

## 2019-05-09 PROCEDURE — 87075 CULTR BACTERIA EXCEPT BLOOD: CPT

## 2019-05-09 PROCEDURE — 87116 MYCOBACTERIA CULTURE: CPT

## 2019-05-09 PROCEDURE — 2580000003 HC RX 258: Performed by: SURGERY

## 2019-05-09 PROCEDURE — 3600000012 HC SURGERY LEVEL 2 ADDTL 15MIN: Performed by: SURGERY

## 2019-05-09 PROCEDURE — 3700000001 HC ADD 15 MINUTES (ANESTHESIA): Performed by: SURGERY

## 2019-05-09 PROCEDURE — 7100000000 HC PACU RECOVERY - FIRST 15 MIN: Performed by: SURGERY

## 2019-05-09 PROCEDURE — 87206 SMEAR FLUORESCENT/ACID STAI: CPT

## 2019-05-09 PROCEDURE — 2709999900 HC NON-CHARGEABLE SUPPLY: Performed by: SURGERY

## 2019-05-09 PROCEDURE — 87186 SC STD MICRODIL/AGAR DIL: CPT

## 2019-05-09 PROCEDURE — 2580000003 HC RX 258: Performed by: STUDENT IN AN ORGANIZED HEALTH CARE EDUCATION/TRAINING PROGRAM

## 2019-05-09 PROCEDURE — 87205 SMEAR GRAM STAIN: CPT

## 2019-05-09 PROCEDURE — 2580000003 HC RX 258: Performed by: NURSE ANESTHETIST, CERTIFIED REGISTERED

## 2019-05-09 PROCEDURE — 2500000003 HC RX 250 WO HCPCS: Performed by: SURGERY

## 2019-05-09 PROCEDURE — 99232 SBSQ HOSP IP/OBS MODERATE 35: CPT | Performed by: INTERNAL MEDICINE

## 2019-05-09 PROCEDURE — 6360000002 HC RX W HCPCS: Performed by: NURSE ANESTHETIST, CERTIFIED REGISTERED

## 2019-05-09 PROCEDURE — 85025 COMPLETE CBC W/AUTO DIFF WBC: CPT

## 2019-05-09 PROCEDURE — 2580000003 HC RX 258: Performed by: INTERNAL MEDICINE

## 2019-05-09 PROCEDURE — 87070 CULTURE OTHR SPECIMN AEROBIC: CPT

## 2019-05-09 PROCEDURE — 82962 GLUCOSE BLOOD TEST: CPT

## 2019-05-09 PROCEDURE — 3700000000 HC ANESTHESIA ATTENDED CARE: Performed by: SURGERY

## 2019-05-09 PROCEDURE — 84100 ASSAY OF PHOSPHORUS: CPT

## 2019-05-09 PROCEDURE — 6360000002 HC RX W HCPCS: Performed by: ANESTHESIOLOGY

## 2019-05-09 PROCEDURE — 0J9C0ZZ DRAINAGE OF PELVIC REGION SUBCUTANEOUS TISSUE AND FASCIA, OPEN APPROACH: ICD-10-PCS | Performed by: SURGERY

## 2019-05-09 PROCEDURE — 2500000003 HC RX 250 WO HCPCS: Performed by: NURSE ANESTHETIST, CERTIFIED REGISTERED

## 2019-05-09 PROCEDURE — 36415 COLL VENOUS BLD VENIPUNCTURE: CPT

## 2019-05-09 PROCEDURE — 3600000002 HC SURGERY LEVEL 2 BASE: Performed by: SURGERY

## 2019-05-09 PROCEDURE — 2500000003 HC RX 250 WO HCPCS: Performed by: INTERNAL MEDICINE

## 2019-05-09 PROCEDURE — 83735 ASSAY OF MAGNESIUM: CPT

## 2019-05-09 PROCEDURE — 1200000000 HC SEMI PRIVATE

## 2019-05-09 PROCEDURE — 80048 BASIC METABOLIC PNL TOTAL CA: CPT

## 2019-05-09 PROCEDURE — 7100000001 HC PACU RECOVERY - ADDTL 15 MIN: Performed by: SURGERY

## 2019-05-09 RX ORDER — DEXAMETHASONE SODIUM PHOSPHATE 4 MG/ML
INJECTION, SOLUTION INTRA-ARTICULAR; INTRALESIONAL; INTRAMUSCULAR; INTRAVENOUS; SOFT TISSUE PRN
Status: DISCONTINUED | OUTPATIENT
Start: 2019-05-09 | End: 2019-05-09 | Stop reason: SDUPTHER

## 2019-05-09 RX ORDER — PROPOFOL 10 MG/ML
INJECTION, EMULSION INTRAVENOUS PRN
Status: DISCONTINUED | OUTPATIENT
Start: 2019-05-09 | End: 2019-05-09 | Stop reason: SDUPTHER

## 2019-05-09 RX ORDER — MIDAZOLAM HYDROCHLORIDE 1 MG/ML
INJECTION INTRAMUSCULAR; INTRAVENOUS PRN
Status: DISCONTINUED | OUTPATIENT
Start: 2019-05-09 | End: 2019-05-09 | Stop reason: SDUPTHER

## 2019-05-09 RX ORDER — FENTANYL CITRATE 50 UG/ML
INJECTION, SOLUTION INTRAMUSCULAR; INTRAVENOUS PRN
Status: DISCONTINUED | OUTPATIENT
Start: 2019-05-09 | End: 2019-05-09 | Stop reason: SDUPTHER

## 2019-05-09 RX ORDER — INSULIN GLARGINE 100 [IU]/ML
14 INJECTION, SOLUTION SUBCUTANEOUS 2 TIMES DAILY
Status: COMPLETED | OUTPATIENT
Start: 2019-05-09 | End: 2019-05-09

## 2019-05-09 RX ORDER — ONDANSETRON 2 MG/ML
INJECTION INTRAMUSCULAR; INTRAVENOUS PRN
Status: DISCONTINUED | OUTPATIENT
Start: 2019-05-09 | End: 2019-05-09 | Stop reason: SDUPTHER

## 2019-05-09 RX ORDER — LIDOCAINE HYDROCHLORIDE 20 MG/ML
INJECTION, SOLUTION EPIDURAL; INFILTRATION; INTRACAUDAL; PERINEURAL PRN
Status: DISCONTINUED | OUTPATIENT
Start: 2019-05-09 | End: 2019-05-09 | Stop reason: SDUPTHER

## 2019-05-09 RX ORDER — LABETALOL HYDROCHLORIDE 5 MG/ML
5 INJECTION, SOLUTION INTRAVENOUS EVERY 10 MIN PRN
Status: DISCONTINUED | OUTPATIENT
Start: 2019-05-09 | End: 2019-05-09 | Stop reason: HOSPADM

## 2019-05-09 RX ORDER — PROMETHAZINE HYDROCHLORIDE 25 MG/ML
25 INJECTION, SOLUTION INTRAMUSCULAR; INTRAVENOUS PRN
Status: DISCONTINUED | OUTPATIENT
Start: 2019-05-09 | End: 2019-05-09 | Stop reason: HOSPADM

## 2019-05-09 RX ORDER — LIDOCAINE HYDROCHLORIDE 10 MG/ML
INJECTION, SOLUTION INFILTRATION; PERINEURAL PRN
Status: DISCONTINUED | OUTPATIENT
Start: 2019-05-09 | End: 2019-05-09 | Stop reason: ALTCHOICE

## 2019-05-09 RX ORDER — SODIUM CHLORIDE 9 MG/ML
INJECTION, SOLUTION INTRAVENOUS CONTINUOUS PRN
Status: DISCONTINUED | OUTPATIENT
Start: 2019-05-09 | End: 2019-05-09 | Stop reason: SDUPTHER

## 2019-05-09 RX ORDER — MEPERIDINE HYDROCHLORIDE 25 MG/ML
12.5 INJECTION INTRAMUSCULAR; INTRAVENOUS; SUBCUTANEOUS EVERY 5 MIN PRN
Status: DISCONTINUED | OUTPATIENT
Start: 2019-05-09 | End: 2019-05-09 | Stop reason: HOSPADM

## 2019-05-09 RX ADMIN — FENTANYL CITRATE 50 MCG: 50 INJECTION, SOLUTION INTRAMUSCULAR; INTRAVENOUS at 11:39

## 2019-05-09 RX ADMIN — DOXYCYCLINE 100 MG: 100 INJECTION, POWDER, LYOPHILIZED, FOR SOLUTION INTRAVENOUS at 02:58

## 2019-05-09 RX ADMIN — LIDOCAINE HYDROCHLORIDE 60 MG: 20 INJECTION, SOLUTION EPIDURAL; INFILTRATION; INTRACAUDAL; PERINEURAL at 11:25

## 2019-05-09 RX ADMIN — PROPOFOL 110 MG: 10 INJECTION, EMULSION INTRAVENOUS at 11:25

## 2019-05-09 RX ADMIN — OXYCODONE HYDROCHLORIDE AND ACETAMINOPHEN 1 TABLET: 5; 325 TABLET ORAL at 01:55

## 2019-05-09 RX ADMIN — TRAMADOL HYDROCHLORIDE 50 MG: 50 TABLET, FILM COATED ORAL at 22:58

## 2019-05-09 RX ADMIN — INSULIN GLARGINE 14 UNITS: 100 INJECTION, SOLUTION SUBCUTANEOUS at 19:59

## 2019-05-09 RX ADMIN — DEXAMETHASONE SODIUM PHOSPHATE 8 MG: 4 INJECTION, SOLUTION INTRAMUSCULAR; INTRAVENOUS at 11:44

## 2019-05-09 RX ADMIN — INSULIN LISPRO 2 UNITS: 100 INJECTION, SOLUTION INTRAVENOUS; SUBCUTANEOUS at 20:00

## 2019-05-09 RX ADMIN — INSULIN LISPRO 4 UNITS: 100 INJECTION, SOLUTION INTRAVENOUS; SUBCUTANEOUS at 14:31

## 2019-05-09 RX ADMIN — PROPOFOL 40 MG: 10 INJECTION, EMULSION INTRAVENOUS at 11:39

## 2019-05-09 RX ADMIN — ONDANSETRON HYDROCHLORIDE 4 MG: 2 INJECTION, SOLUTION INTRAMUSCULAR; INTRAVENOUS at 11:44

## 2019-05-09 RX ADMIN — INSULIN GLARGINE 14 UNITS: 100 INJECTION, SOLUTION SUBCUTANEOUS at 09:08

## 2019-05-09 RX ADMIN — SODIUM CHLORIDE: 9 INJECTION, SOLUTION INTRAVENOUS at 00:07

## 2019-05-09 RX ADMIN — BACITRACIN: 500 OINTMENT TOPICAL at 09:16

## 2019-05-09 RX ADMIN — INSULIN LISPRO 2 UNITS: 100 INJECTION, SOLUTION INTRAVENOUS; SUBCUTANEOUS at 17:58

## 2019-05-09 RX ADMIN — DOXYCYCLINE 100 MG: 100 INJECTION, POWDER, LYOPHILIZED, FOR SOLUTION INTRAVENOUS at 15:13

## 2019-05-09 RX ADMIN — SODIUM CHLORIDE: 9 INJECTION, SOLUTION INTRAVENOUS at 15:13

## 2019-05-09 RX ADMIN — SODIUM CHLORIDE: 9 INJECTION, SOLUTION INTRAVENOUS at 11:22

## 2019-05-09 RX ADMIN — HYDROMORPHONE HYDROCHLORIDE 0.5 MG: 1 INJECTION, SOLUTION INTRAMUSCULAR; INTRAVENOUS; SUBCUTANEOUS at 12:15

## 2019-05-09 RX ADMIN — HYDROMORPHONE HYDROCHLORIDE 0.5 MG: 1 INJECTION, SOLUTION INTRAMUSCULAR; INTRAVENOUS; SUBCUTANEOUS at 12:25

## 2019-05-09 RX ADMIN — OXYCODONE HYDROCHLORIDE AND ACETAMINOPHEN 1 TABLET: 5; 325 TABLET ORAL at 09:24

## 2019-05-09 RX ADMIN — Medication 10 ML: at 09:26

## 2019-05-09 RX ADMIN — BACITRACIN: 500 OINTMENT TOPICAL at 14:43

## 2019-05-09 RX ADMIN — OXYCODONE HYDROCHLORIDE AND ACETAMINOPHEN 1 TABLET: 5; 325 TABLET ORAL at 19:53

## 2019-05-09 RX ADMIN — MIDAZOLAM HYDROCHLORIDE 2 MG: 1 INJECTION, SOLUTION INTRAMUSCULAR; INTRAVENOUS at 11:39

## 2019-05-09 ASSESSMENT — PULMONARY FUNCTION TESTS
PIF_VALUE: 2
PIF_VALUE: 3
PIF_VALUE: 2
PIF_VALUE: 3
PIF_VALUE: 2
PIF_VALUE: 1
PIF_VALUE: 2
PIF_VALUE: 3
PIF_VALUE: 0
PIF_VALUE: 3
PIF_VALUE: 2
PIF_VALUE: 3
PIF_VALUE: 2
PIF_VALUE: 20
PIF_VALUE: 1
PIF_VALUE: 1
PIF_VALUE: 26
PIF_VALUE: 1
PIF_VALUE: 2
PIF_VALUE: 3
PIF_VALUE: 0
PIF_VALUE: 17
PIF_VALUE: 3
PIF_VALUE: 2
PIF_VALUE: 3
PIF_VALUE: 0
PIF_VALUE: 2
PIF_VALUE: 0
PIF_VALUE: 2
PIF_VALUE: 3
PIF_VALUE: 35
PIF_VALUE: 2
PIF_VALUE: 3
PIF_VALUE: 2

## 2019-05-09 ASSESSMENT — PAIN DESCRIPTION - DESCRIPTORS
DESCRIPTORS: BURNING
DESCRIPTORS: BURNING
DESCRIPTORS: DISCOMFORT;ACHING
DESCRIPTORS: BURNING;CONSTANT;DISCOMFORT
DESCRIPTORS: ACHING;DISCOMFORT
DESCRIPTORS: BURNING

## 2019-05-09 ASSESSMENT — PAIN DESCRIPTION - ORIENTATION
ORIENTATION: ANTERIOR

## 2019-05-09 ASSESSMENT — PAIN SCALES - GENERAL
PAINLEVEL_OUTOF10: 10
PAINLEVEL_OUTOF10: 5
PAINLEVEL_OUTOF10: 3
PAINLEVEL_OUTOF10: 0
PAINLEVEL_OUTOF10: 10
PAINLEVEL_OUTOF10: 7
PAINLEVEL_OUTOF10: 0
PAINLEVEL_OUTOF10: 10
PAINLEVEL_OUTOF10: 10
PAINLEVEL_OUTOF10: 4
PAINLEVEL_OUTOF10: 10

## 2019-05-09 ASSESSMENT — PAIN DESCRIPTION - PAIN TYPE
TYPE: SURGICAL PAIN
TYPE: ACUTE PAIN

## 2019-05-09 ASSESSMENT — PAIN DESCRIPTION - ONSET
ONSET: ON-GOING

## 2019-05-09 ASSESSMENT — PAIN DESCRIPTION - FREQUENCY
FREQUENCY: CONTINUOUS

## 2019-05-09 ASSESSMENT — PAIN DESCRIPTION - PROGRESSION
CLINICAL_PROGRESSION: GRADUALLY WORSENING

## 2019-05-09 ASSESSMENT — PAIN DESCRIPTION - LOCATION
LOCATION: PELVIS

## 2019-05-09 NOTE — ANESTHESIA PRE PROCEDURE
Department of Anesthesiology  Preprocedure Note       Name:  Nicole Freeman   Age:  26 y.o.  :  1995                                          MRN:  [id number]         Date:  2019      Surgeon: Darien Nunez):  Javed Will MD    Procedure: INCISION AND DRAINAGE OF SUPRAPUBIC ABSESS (N/A )    Medications prior to admission:   Prior to Admission medications    Medication Sig Start Date End Date Taking?  Authorizing Provider   NOVOLOG FLEXPEN 100 UNIT/ML injection pen Inject 4-10 Units into the skin 3 times daily (before meals) Take 4 units plus  **Corrective Low Dose Algorithm**  Glucose: Dose:               No Insulin  140-199 1 Unit  200-249 2 Units  250-299 3 Units  300-349 4 Units  350-399 5 Units  Over 399 6 Units 4/15/19  Yes Historical Provider, MD   ibuprofen (ADVIL;MOTRIN) 200 MG tablet Take 800 mg by mouth every 6 hours as needed for Pain   Yes Historical Provider, MD   insulin glargine (BASAGLAR KWIKPEN) 100 UNIT/ML injection pen Inject 25 Units into the skin nightly  Patient taking differently: Inject 28 Units into the skin nightly  18  Yes J Luis Medrano MD   acetone, urine, test strip Use daily as directed if bs >250 x2 or illness 18   Historical Provider, MD   FREESTYLE LITE strip USE AS DIRECTED UP TO 6 TIMES DAILY 18   Historical Provider, MD   BD PEN NEEDLE SHELBIE U/F 32G X 4 MM MISC USE AS DIRECTED UP TO 6 TIMES A DAY 18   Historical Provider, MD       Current medications:    Current Facility-Administered Medications   Medication Dose Route Frequency Provider Last Rate Last Dose    insulin glargine (LANTUS) injection vial 14 Units  14 Units Subcutaneous BID Andrei Willams MD   14 Units at 19 0908    traMADol (ULTRAM) tablet 50 mg  50 mg Oral Q6H PRN Deanna Ramírez MD   50 mg at 196    insulin lispro (HUMALOG) injection vial 0-12 Units  0-12 Units Subcutaneous TID WC Deanna Ramírez MD   2 Units at 19 0224    insulin lispro (HUMALOG) injection vial 0-6 Units  0-6 Units Subcutaneous Nightly Aretha Byrd MD   1 Units at 05/08/19 2059    glucose (GLUTOSE) 40 % oral gel 15 g  15 g Oral PRN Aretha Byrd MD        dextrose 50 % solution 12.5 g  12.5 g Intravenous PRN Aretha Byrd MD        glucagon (rDNA) injection 1 mg  1 mg Intramuscular PRN Aretha Byrd MD        dextrose 5 % solution  100 mL/hr Intravenous PRN Aretha Byrd MD        lidocaine-EPINEPHrine 1 percent-[id number] injection 20 mL  20 mL Intradermal See Admin Instructions Drake Cespedes MD        0.9 % sodium chloride infusion   Intravenous Continuous Drake Cespedes  mL/hr at 05/09/19 0007      acetaminophen (TYLENOL) tablet 650 mg  650 mg Oral Q4H PRN Mariel Moncada MD   325 mg at 05/08/19 2047    ondansetron (ZOFRAN) injection 4 mg  4 mg Intravenous Q6H PRN Mariel Moncada MD   4 mg at 05/08/19 2047    sodium chloride flush 0.9 % injection 10 mL  10 mL Intravenous PRN Aretha Byrd MD   10 mL at 05/07/19 0950    sodium chloride flush 0.9 % injection 10 mL  10 mL Intravenous BID Aretha Byrd MD   10 mL at 05/09/19 0926    oxyCODONE-acetaminophen (PERCOCET) 5-325 MG per tablet 1 tablet  1 tablet Oral Q6H PRN Aretha Byrd MD   1 tablet at 05/09/19 5709    bacitracin ointment   Topical TID Aretha Byrd MD        enoxaparin (LOVENOX) injection 40 mg  40 mg Subcutaneous Daily Aretha Byrd MD   Stopped at 05/09/19 0900    doxycycline (VIBRAMYCIN) 100 mg in dextrose 5 % 100 mL IVPB  100 mg Intravenous Q12H Aretha Byrd MD   Stopped at 05/09/19 7199       Allergies:  No Known Allergies    Problem List:    Patient Active Problem List   Diagnosis Code    Diabetes mellitus type 1, uncontrolled ([facility] E10.65    Personal history of noncompliance with medical treatment, presenting hazards to health Z91.19    DKA, type 1, not at goal ([facility] E10.10    Sepsis ([facility] A41.9    Bacterial folliculitis T05.7       Past Medical History:        Diagnosis Date    Bleeding at insertion site 1/5/2018    Common femoral artery injury, right, initial encounter 1/5/2018    DM type 1 (diabetes mellitus, type 1) ([facility]        Past Surgical History:  History reviewed. No pertinent surgical history. Social History:    Social History     Tobacco Use    Smoking status: Never Smoker    Smokeless tobacco: Never Used   Substance Use Topics    Alcohol use: No                                Counseling given: Not Answered      Vital Signs (Current):   Vitals:    05/09/19 0409 05/09/19 0449 05/09/19 0500 05/09/19 0800   BP:  126/80  124/87   Pulse: 128 123  118   Resp:  18  18   Temp:  99 °F (37.2 °C)  97.8 °F (36.6 °C)   TempSrc:  Oral  Oral   SpO2:  95%  100%   Weight:   123 lb 11.2 oz (56.1 kg)    Height:                                                  BP Readings from Last 3 Encounters:   05/09/19 124/87   05/05/19 (!) 138/94   05/01/19 120/80       NPO Status: Time of last liquid consumption: 0200(sip with medication)                                                 Date of last liquid consumption: 05/09/19                        Date of last solid food consumption: 05/08/19    BMI:   Wt Readings from Last 3 Encounters:   05/09/19 123 lb 11.2 oz (56.1 kg)   05/05/19 115 lb (52.2 kg)   05/01/19 115 lb (52.2 kg)     Body mass index is 23.37 kg/m².     CBC:   Lab Results   Component Value Date    WBC 11.3 05/09/2019    RBC 3.60 05/09/2019    HGB 10.2 05/09/2019    HCT 30.0 05/09/2019    MCV 83.3 05/09/2019    RDW 12.7 05/09/2019     05/09/2019       CMP:   Lab Results   Component Value Date     05/09/2019    K 4.1 05/09/2019    K 5.0 05/07/2019     05/09/2019    CO2 20 05/09/2019    BUN 4 05/09/2019    CREATININE 0.6 05/09/2019    GFRAA >60 05/09/2019    LABGLOM >60 05/09/2019    GLUCOSE 152 05/09/2019    PROT 8.6 05/07/2019    CALCIUM 8.5 05/09/2019    BILITOT <0.2 05/07/2019    ALKPHOS 212 05/07/2019    AST 20 05/07/2019    ALT 19 05/07/2019       POC Tests: No results for input(s): POCGLU, POCNA, POCK, POCCL, POCBUN, POCHEMO, POCHCT in the last 72 hours. Coags:   Lab Results   Component Value Date    PROTIME 11.6 02/10/2019    INR 1.0 02/10/2019    APTT 35.3 02/10/2019       HCG (If Applicable):   Lab Results   Component Value Date    PREGTESTUR negative 04/11/2019        ABGs: No results found for: PHART, PO2ART, QTA9HRL, HNJ9GAE, BEART, Z6LJZHGJ     Type & Screen (If Applicable):  No results found for: LABABO, [address]    Anesthesia Evaluation  Patient summary reviewed no history of anesthetic complications:   Airway: Mallampati: II  TM distance: >3 FB   Neck ROM: full  Mouth opening: > = 3 FB Dental: normal exam         Pulmonary:Negative Pulmonary ROS breath sounds clear to auscultation                             Cardiovascular:Negative CV ROS          ECG reviewed  Rhythm: regular  Rate: abnormal                   PE comment: Rate 118   Neuro/Psych:   Negative Neuro/Psych ROS              GI/Hepatic/Renal: Neg GI/Hepatic/Renal ROS            Endo/Other:    (+) DiabetesType I DM, poorly controlled, using insulin, blood dyscrasia (HGB 10.2): anemia:., electrolyte abnormalities (Ca++ 8.5 / Mg++ 1.5), . Abdominal:           Vascular: negative vascular ROS. Anesthesia Plan      general     ASA 3       Induction: intravenous. MIPS: Postoperative opioids intended and Prophylactic antiemetics administered. Anesthetic plan and risks discussed with patient. Plan discussed with CRNA.                   Lindsey Krishnan MD   5/9/2019

## 2019-05-09 NOTE — PROGRESS NOTES
tympanic membrane, external ear and ear canal normal bilaterally, oropharynx clear and moist with normal mucous membranes  Neck: neck supple and non tender without mass, no thyromegaly or thyroid nodules, no cervical lymphadenopathy   Pulmonary/Chest: clear to auscultation bilaterally- no wheezes, rales or rhonchi, normal air movement, no respiratory distress  Cardiovascular: normal rate, normal S1 and S2, no gallops, intact distal pulses and no carotid bruits  Abdomen: soft, non-tender, non-distended, normal bowel sounds, no masses or organomegaly    Local Exam  Induration + / Tender  Recent Labs     05/08/19  0820 05/08/19  1404 05/09/19  0311    130* 134   K 4.1 4.4 4.1    101 104   CO2 17* 18* 20*   BUN 3* 2* 4*   CREATININE 0.5 0.5 0.6   GLUCOSE 151* 234* 152*   CALCIUM 8.4* 8.6 8.5*       Recent Labs     05/07/19  0954 05/08/19  0409 05/09/19  0311   WBC 21.3* 9.4 11.3   RBC 4.87 3.60 3.60   HGB 13.8 10.2* 10.2*   HCT 42.2 30.9* 30.0*   MCV 86.7 85.8 83.3   MCH 28.3 28.3 28.3   MCHC 32.7 33.0 34.0   RDW 13.2 13.0 12.7    260 260   MPV 11.5 10.8 10.2         Radiology:   US NON OB TRANSVAGINAL   Final Result   Unremarkable study of the pelvis. XR CHEST PORTABLE   Final Result   Normal chest and unchanged               CT ABDOMEN PELVIS WO CONTRAST   Final Result   Superficial skin thickening and contiguous infiltration/phlegmon   within the anterior subcutaneous fat in the pelvis. There is no   drainable abscess. Findings would suggest possible cellulitis. Assessment:    Active Problems:    DKA, type 1, not at goal ([address]    Sepsis ([address]    Bacterial folliculitis  Resolved Problems:    * No resolved hospital problems. *      Plan:  1. DKA improved  2. Leucocytosis / Improved  3. Folliculitis / early abscess / For I & D / surgery on board. 4. Remains on Doxycycline. 5. DM, Lantuss last received was yesterday at 11 am. Patient usually takes at home in evening.  Will give 14

## 2019-05-09 NOTE — OP NOTE
Operative Note    Preop Dx: Suprapubic subcutanous abscess    Postop Dx: same    Procedure: Incision and drainage of suprapubic abscess with loop drain insertion to level of subcutaneous tissue and fascia    Surgeon: Pieter Oneal MD    EBL: 2 mL (minimal)    Complications: None    Findings: moderate amount of pus, fascia intact, mulitple loculated abscesses    Disposition: PACU, stable    Indications:    26year old female with history, physical and laboratory findings consistent with a suprapubic subcutaneous abscess. Incision and drainage was recommended. Risks, benefits, alternatives (and risks of alternatives) of surgery were discussed with the patient, which include but are not limited to, bleeding, infection, risk of further debridement, risk of cosmetic deformity, nerve injury, risk of anesthesia, blood clots, pneumonia, heart attack and stroke. Patient understands these risk and agrees to proceed. Details of Procedure:    Patient was taken to the operating room and placed in the supine position. Anesthesia was induced by anesthesia team. Suprapubic area was prepped and draped in the sterile fashion. 4 mL of 1% lidocaine was instilled at the area of fluctuance. A 3 cm transverse incision was made at the area of fluctuance and a moderate amount purulence was expressed and sent for culture. This area was then explored with my finger and was found to undermine towards the right lateral pubic area where another smaller abscess pocket was identifed. This was incised with a 15 blade scalpel and a 1/4\" penrose loop drain was placed though both defects. This was secured with a 2-0 nylon suture. The underlying fascia was intact and viable. Next the wound was irrigated with sterile saline until the irrigation being suction was clear. Hemostasis was checked and was excellent. The larger wound was packed with 2\" iodoform packing and a sterile dressing was applied.   All sponge, needle and instrumentation counts were correct at the end of the procedure. Patient tolerated the procedure well and was taken to PACU in stable condition.

## 2019-05-09 NOTE — PROGRESS NOTES
Patient off the unit for surgery--we will follow up with her at a later time.   Thank you.  -Luiz Maxwell RN, BSN, CDE

## 2019-05-09 NOTE — PATIENT CARE CONFERENCE
[facility] Quality Flow/Interdisciplinary Rounds Progress Note        Quality Flow Rounds held on May 9, 2019    Disciplines Attending:  Bedside Nurse, ,  and Nursing Unit Leadership    Samuel Navas was admitted on 5/7/2019  8:33 AM    Anticipated Discharge Date:  Expected Discharge Date: 05/10/19    Disposition:    Gavino Score:  Gavino Scale Score: 19    Readmission Risk              Risk of Unplanned Readmission:        23           Discussed patient goal for the day, patient clinical progression, and barriers to discharge.   The following Goal(s) of the Day/Commitment(s) have been identified:  OR for I&D today       Maida Dominguez  May 9, 2019

## 2019-05-09 NOTE — PLAN OF CARE
Problem: GLYCEMIA IMBALANCE  Goal: Knowledge of diabetes self-management  Outcome: Met This Shift     Problem: Pain:  Goal: Pain level will decrease  Description  Pain level will decrease     Pain level will decrease  Outcome: Met This Shift     Problem: Infection, Sepsis  Goal: Patient exhibits no signs of infection  Outcome: Met This Shift     Problem: Falls - Risk of:  Goal: Will remain free from falls  Description  Will remain free from falls  5/8/2019 2012 by Renee Cui RN  Outcome: Met This Shift  5/8/2019 1825 by Gabriella Laguerre RN  Outcome: Met This Shift  Goal: Absence of physical injury  Description  Absence of physical injury  5/8/2019 2012 by Renee Cui RN  Outcome: Met This Shift  5/8/2019 1825 by Gabriella Laguerre RN  Outcome: Met This Shift     Problem: Skin Integrity:  Goal: Will show no infection signs and symptoms  Description  Will show no infection signs and symptoms  Outcome: Met This Shift  Goal: Absence of new skin breakdown  Description  Absence of new skin breakdown  5/8/2019 2012 by Renee Cui RN  Outcome: Met This Shift  5/8/2019 1825 by Gabriella Laguerre RN  Outcome: Met This Shift     Problem: Pain:  Goal: Pain level will decrease  Description  Pain level will decrease     Pain level will decrease  Outcome: Met This Shift  Goal: Control of acute pain  Description  Control of acute pain  Outcome: Met This Shift  Goal: Control of chronic pain  Description  Control of chronic pain  Outcome: Met This Shift     Problem: Wound:  Goal: Signs of wound healing will improve  Description  Signs of wound healing will improve     Outcome: Not Met This Shift

## 2019-05-09 NOTE — PLAN OF CARE
Problem: Pain:  Goal: Pain level will decrease  Description  Pain level will decrease     Pain level will decrease  5/9/2019 1507 by Mickey Gonsales RN  Outcome: Met This Shift  5/9/2019 0429 by Wilbert Perry RN  Outcome: Met This Shift     Problem: Falls - Risk of:  Goal: Will remain free from falls  Description  Will remain free from falls  5/9/2019 1507 by Mickey Gonsales RN  Outcome: Met This Shift  5/9/2019 0429 by Wilbert Perry RN  Outcome: Met This Shift  Goal: Absence of physical injury  Description  Absence of physical injury  5/9/2019 1507 by Mickey Gonsales RN  Outcome: Met This Shift  5/9/2019 0429 by Wilbert Perry RN  Outcome: Met This Shift     Problem: Skin Integrity:  Goal: Absence of new skin breakdown  Description  Absence of new skin breakdown  Outcome: Met This Shift     Problem: Pain:  Goal: Pain level will decrease  Description  Pain level will decrease     Pain level will decrease  5/9/2019 1507 by Mickey Gonsales RN  Outcome: Met This Shift  5/9/2019 0429 by Wilbert Perry RN  Outcome: Met This Shift  Goal: Control of acute pain  Description  Control of acute pain  Outcome: Met This Shift  Goal: Control of chronic pain  Description  Control of chronic pain  Outcome: Met This Shift

## 2019-05-09 NOTE — PROGRESS NOTES
Nursing Transfer Note    Data:  Summary of patients progress: I & D of abdomenal abscess  Reason for transfer: continuation of care    Action:  Explained reason for transfer to patient. Waiting room stated family is not present  Report given to: jose m using RN Handoff Navigator.   Mode of transportation: cart    Response:  RN Recommendations: follow post op orders

## 2019-05-09 NOTE — ANESTHESIA POSTPROCEDURE EVALUATION
Department of Anesthesiology  Postprocedure Note    Patient: Mindy Macias  MRN: [id number]  YOB: 1995  Date of evaluation: 5/9/2019  Time:  2:38 PM     Procedure Summary     Date:  05/09/19 Room / Location:  [facility] OR 04 / [facility] OR    Anesthesia Start:  1122 Anesthesia Stop:  1207    Procedure:  INCISION AND DRAINAGE OF SUPRAPUBIC ABSESS (N/A ) Diagnosis:  (-)    Surgeon:  Bailey Mohamud MD Responsible Provider:  Darien Huerta MD    Anesthesia Type:  general ASA Status:  3          Anesthesia Type: general    Sissy Phase I: Sissy Score: 10    Sissy Phase II:      Last vitals: Reviewed and per EMR flowsheets.        Anesthesia Post Evaluation    Patient location during evaluation: PACU  Patient participation: complete - patient participated  Level of consciousness: awake  Airway patency: patent  Nausea & Vomiting: no nausea and no vomiting  Complications: no  Cardiovascular status: hemodynamically stable  Respiratory status: acceptable  Hydration status: stable

## 2019-05-10 LAB
ANION GAP SERPL CALCULATED.3IONS-SCNC: 14 MMOL/L (ref 7–16)
BASOPHILS ABSOLUTE: 0 E9/L (ref 0–0.2)
BASOPHILS RELATIVE PERCENT: 0 % (ref 0–2)
BUN BLDV-MCNC: 10 MG/DL (ref 6–20)
CALCIUM SERPL-MCNC: 8.6 MG/DL (ref 8.6–10.2)
CHLORIDE BLD-SCNC: 104 MMOL/L (ref 98–107)
CO2: 19 MMOL/L (ref 22–29)
CREAT SERPL-MCNC: 0.6 MG/DL (ref 0.5–1)
DOHLE BODIES: ABNORMAL
EOSINOPHILS ABSOLUTE: 0 E9/L (ref 0.05–0.5)
EOSINOPHILS RELATIVE PERCENT: 0 % (ref 0–6)
GFR AFRICAN AMERICAN: >60
GFR NON-AFRICAN AMERICAN: >60 ML/MIN/1.73
GLUCOSE BLD-MCNC: 274 MG/DL (ref 74–99)
GRAM STAIN ORDERABLE: NORMAL
HCT VFR BLD CALC: 39.4 % (ref 34–48)
HEMOGLOBIN: 12.9 G/DL (ref 11.5–15.5)
LYMPHOCYTES ABSOLUTE: 2.58 E9/L (ref 1.5–4)
LYMPHOCYTES RELATIVE PERCENT: 15.8 % (ref 20–42)
MAGNESIUM: 2 MG/DL (ref 1.6–2.6)
MCH RBC QN AUTO: 28 PG (ref 26–35)
MCHC RBC AUTO-ENTMCNC: 32.7 % (ref 32–34.5)
MCV RBC AUTO: 85.5 FL (ref 80–99.9)
METER GLUCOSE: 138 MG/DL (ref 74–99)
METER GLUCOSE: 150 MG/DL (ref 74–99)
METER GLUCOSE: 192 MG/DL (ref 74–99)
METER GLUCOSE: 293 MG/DL (ref 74–99)
MONOCYTES ABSOLUTE: 0.16 E9/L (ref 0.1–0.95)
MONOCYTES RELATIVE PERCENT: 0.9 % (ref 2–12)
NEUTROPHILS ABSOLUTE: 13.36 E9/L (ref 1.8–7.3)
NEUTROPHILS RELATIVE PERCENT: 83.3 % (ref 43–80)
NUCLEATED RED BLOOD CELLS: 0 /100 WBC
PDW BLD-RTO: 13 FL (ref 11.5–15)
PHOSPHORUS: 3.5 MG/DL (ref 2.5–4.5)
PLATELET # BLD: 348 E9/L (ref 130–450)
PMV BLD AUTO: 10.4 FL (ref 7–12)
POIKILOCYTES: ABNORMAL
POTASSIUM SERPL-SCNC: 4.1 MMOL/L (ref 3.5–5)
RBC # BLD: 4.61 E12/L (ref 3.5–5.5)
SODIUM BLD-SCNC: 137 MMOL/L (ref 132–146)
TEAR DROP CELLS: ABNORMAL
TOXIC GRANULATION: ABNORMAL
URINE CULTURE, ROUTINE: NORMAL
WBC # BLD: 16.1 E9/L (ref 4.5–11.5)

## 2019-05-10 PROCEDURE — 1200000000 HC SEMI PRIVATE

## 2019-05-10 PROCEDURE — 6370000000 HC RX 637 (ALT 250 FOR IP): Performed by: SURGERY

## 2019-05-10 PROCEDURE — 84100 ASSAY OF PHOSPHORUS: CPT

## 2019-05-10 PROCEDURE — 36415 COLL VENOUS BLD VENIPUNCTURE: CPT

## 2019-05-10 PROCEDURE — 83735 ASSAY OF MAGNESIUM: CPT

## 2019-05-10 PROCEDURE — 2580000003 HC RX 258: Performed by: SURGERY

## 2019-05-10 PROCEDURE — 6370000000 HC RX 637 (ALT 250 FOR IP): Performed by: SPECIALIST

## 2019-05-10 PROCEDURE — 99232 SBSQ HOSP IP/OBS MODERATE 35: CPT | Performed by: INTERNAL MEDICINE

## 2019-05-10 PROCEDURE — 6370000000 HC RX 637 (ALT 250 FOR IP): Performed by: INTERNAL MEDICINE

## 2019-05-10 PROCEDURE — 6360000002 HC RX W HCPCS: Performed by: SURGERY

## 2019-05-10 PROCEDURE — 2500000003 HC RX 250 WO HCPCS: Performed by: SURGERY

## 2019-05-10 PROCEDURE — 2580000003 HC RX 258: Performed by: SPECIALIST

## 2019-05-10 PROCEDURE — 6360000002 HC RX W HCPCS: Performed by: SPECIALIST

## 2019-05-10 PROCEDURE — 80048 BASIC METABOLIC PNL TOTAL CA: CPT

## 2019-05-10 PROCEDURE — 85025 COMPLETE CBC W/AUTO DIFF WBC: CPT

## 2019-05-10 PROCEDURE — 82962 GLUCOSE BLOOD TEST: CPT

## 2019-05-10 RX ORDER — DOXYCYCLINE HYCLATE 100 MG/1
100 CAPSULE ORAL EVERY 12 HOURS SCHEDULED
Status: DISCONTINUED | OUTPATIENT
Start: 2019-05-10 | End: 2019-05-13 | Stop reason: HOSPADM

## 2019-05-10 RX ORDER — DOXYCYCLINE HYCLATE 100 MG/1
100 CAPSULE ORAL EVERY 12 HOURS SCHEDULED
Qty: 20 CAPSULE | Refills: 0 | Status: SHIPPED | OUTPATIENT
Start: 2019-05-10 | End: 2019-05-20

## 2019-05-10 RX ORDER — INSULIN GLARGINE 100 [IU]/ML
28 INJECTION, SOLUTION SUBCUTANEOUS NIGHTLY
Status: DISCONTINUED | OUTPATIENT
Start: 2019-05-10 | End: 2019-05-13 | Stop reason: HOSPADM

## 2019-05-10 RX ADMIN — Medication 10 ML: at 08:40

## 2019-05-10 RX ADMIN — OXYCODONE HYDROCHLORIDE AND ACETAMINOPHEN 1 TABLET: 5; 325 TABLET ORAL at 20:35

## 2019-05-10 RX ADMIN — Medication 10 ML: at 20:40

## 2019-05-10 RX ADMIN — INSULIN LISPRO 6 UNITS: 100 INJECTION, SOLUTION INTRAVENOUS; SUBCUTANEOUS at 08:41

## 2019-05-10 RX ADMIN — OXYCODONE HYDROCHLORIDE AND ACETAMINOPHEN 1 TABLET: 5; 325 TABLET ORAL at 12:47

## 2019-05-10 RX ADMIN — DOXYCYCLINE 100 MG: 100 INJECTION, POWDER, LYOPHILIZED, FOR SOLUTION INTRAVENOUS at 03:09

## 2019-05-10 RX ADMIN — VANCOMYCIN HYDROCHLORIDE 1250 MG: 10 INJECTION, POWDER, LYOPHILIZED, FOR SOLUTION INTRAVENOUS at 20:35

## 2019-05-10 RX ADMIN — INSULIN LISPRO 2 UNITS: 100 INJECTION, SOLUTION INTRAVENOUS; SUBCUTANEOUS at 12:43

## 2019-05-10 RX ADMIN — INSULIN LISPRO 1 UNITS: 100 INJECTION, SOLUTION INTRAVENOUS; SUBCUTANEOUS at 20:46

## 2019-05-10 RX ADMIN — OXYCODONE HYDROCHLORIDE AND ACETAMINOPHEN 1 TABLET: 5; 325 TABLET ORAL at 06:49

## 2019-05-10 RX ADMIN — DOXYCYCLINE HYCLATE 100 MG: 100 CAPSULE ORAL at 20:35

## 2019-05-10 RX ADMIN — SODIUM CHLORIDE: 9 INJECTION, SOLUTION INTRAVENOUS at 03:09

## 2019-05-10 RX ADMIN — INSULIN GLARGINE 28 UNITS: 100 INJECTION, SOLUTION SUBCUTANEOUS at 20:46

## 2019-05-10 ASSESSMENT — PAIN SCALES - GENERAL
PAINLEVEL_OUTOF10: 7
PAINLEVEL_OUTOF10: 7
PAINLEVEL_OUTOF10: 4
PAINLEVEL_OUTOF10: 10
PAINLEVEL_OUTOF10: 2
PAINLEVEL_OUTOF10: 2

## 2019-05-10 ASSESSMENT — PAIN DESCRIPTION - ONSET
ONSET: ON-GOING

## 2019-05-10 ASSESSMENT — PAIN DESCRIPTION - LOCATION
LOCATION: INCISION
LOCATION: PELVIS
LOCATION: PELVIS

## 2019-05-10 ASSESSMENT — PAIN DESCRIPTION - DESCRIPTORS
DESCRIPTORS: ACHING;JABBING
DESCRIPTORS: DISCOMFORT;ACHING
DESCRIPTORS: ACHING;DISCOMFORT

## 2019-05-10 ASSESSMENT — PAIN DESCRIPTION - FREQUENCY
FREQUENCY: CONTINUOUS

## 2019-05-10 ASSESSMENT — PAIN DESCRIPTION - PAIN TYPE
TYPE: SURGICAL PAIN

## 2019-05-10 ASSESSMENT — PAIN DESCRIPTION - PROGRESSION
CLINICAL_PROGRESSION: GRADUALLY WORSENING

## 2019-05-10 ASSESSMENT — PAIN - FUNCTIONAL ASSESSMENT
PAIN_FUNCTIONAL_ASSESSMENT: PREVENTS OR INTERFERES SOME ACTIVE ACTIVITIES AND ADLS
PAIN_FUNCTIONAL_ASSESSMENT: PREVENTS OR INTERFERES SOME ACTIVE ACTIVITIES AND ADLS

## 2019-05-10 NOTE — PROGRESS NOTES
[facility] Hospitalist   Progress Note    Admitting Date and Time: 5/7/2019  8:33 AM  Admit Dx: DKA, type 1, not at goal ([facility] [E10.10]  DKA, type 1, not at goal [facility]) [E10.10]  DKA, type 1, not at goal [facility]) [E10.10]    Subjective: Admitted on 5/7 with DKA, had abdominal pain,did have sepsis due to folliculitis, managed in ICU, now AG is 11. Patient has been seen by surgery, and has under gone  I & D with Insertion of Loop drain yesterday. Patient was admitted with DKA, type 1, not at goal [facility]) [E10.10]  DKA, type 1, not at goal [facility]) [E10.10]  DKA, type 1, not at goal [facility]) [E10.10]. Patient feels better. Do have some pain near I & D site. Awake, alert, comfortable. Per RN:     ROS: denies fever, chills, cp, sob, n/v, HA unless stated above.      insulin lispro  0-12 Units Subcutaneous TID WC    insulin lispro  0-6 Units Subcutaneous Nightly    lidocaine-EPINEPHrine  20 mL Intradermal See Admin Instructions    sodium chloride flush  10 mL Intravenous BID    bacitracin   Topical TID    enoxaparin  40 mg Subcutaneous Daily    doxycycline (VIBRAMYCIN) IV  100 mg Intravenous Q12H       traMADol 50 mg Q6H PRN   glucose 15 g PRN   dextrose 12.5 g PRN   glucagon (rDNA) 1 mg PRN   dextrose 100 mL/hr PRN   acetaminophen 650 mg Q4H PRN   ondansetron 4 mg Q6H PRN   sodium chloride flush 10 mL PRN   oxyCODONE-acetaminophen 1 tablet Q6H PRN        Objective:    /88   Pulse 104   Temp 98.1 °F (36.7 °C) (Oral)   Resp 18   Ht 5' 1\" (1.549 m)   Wt 125 lb (56.7 kg)   LMP 04/27/2019   SpO2 100%   BMI 23.62 kg/m²   General Appearance: alert and oriented to person, place and time, well-developed and well-nourished, in no acute distress  Skin: warm and dry, no rash or erythema  Head: normocephalic and atraumatic  Eyes: pupils equal, round, and reactive to light, extraocular eye movements intact, conjunctivae normal  ENT: tympanic membrane, external ear and ear canal normal bilaterally, Sofia Diop MD on 5/10/2019 at 7:48 AM

## 2019-05-10 NOTE — PLAN OF CARE
Problem: Falls - Risk of:  Goal: Will remain free from falls  Description  Will remain free from falls  5/10/2019 0020 by Lawyer Sara RN  Outcome: Met This Shift  5/9/2019 1507 by Shakeel Saldana RN  Outcome: Met This Shift     Problem: Falls - Risk of:  Goal: Absence of physical injury  Description  Absence of physical injury  5/10/2019 0020 by Lawyer Sara RN  Outcome: Met This Shift  5/9/2019 1507 by Shakeel Saldana RN  Outcome: Met This Shift

## 2019-05-10 NOTE — CONSULTS
[address] [place] [address] [facility]    Consultation Note     Admit Date: 5/7/2019  8:33 AM    Reason for Consult:   cellulitis and abscess S/P area    Attending Physician:  Braden Sosa MD     Chief Complaint: pain and redness and swelling S/P    HISTORY OF PRESENT ILLNESS:   The patient is a 26 y.o.  women not known to the Infectious Diseases service. The patient is admitted c DKA and ongoing infection in the S/P area. She shaves and uses the razors over again. Also uses towel multiple times after showers. She was originally seen in ED in late April for the same issue and again on 5-4-19. She did not respond to po atb and the pt after ER tried to squeeze fluid out it got worse. She was admitted on 5-7-19 c sob and the supra pubic issue. WBC was 21 K and her BS and HCO3 was 3. She was started on IV DOXY and surg did a formal I&D on 5-9-19  Cultures are pending      .     Past Medical History:        Diagnosis Date    Bleeding at insertion site 1/5/2018    Common femoral artery injury, right, initial encounter 1/5/2018    DM type 1 (diabetes mellitus, type 1) ([facility] [address]      Past Surgical History:        Procedure Laterality Date    ABDOMEN SURGERY N/A 5/9/2019    INCISION AND DRAINAGE OF SUPRAPUBIC ABSESS performed by Jermaine Mckenzie MD at [facility] OR     Current Medications:   Scheduled Meds:   insulin glargine  28 Units Subcutaneous Nightly    vancomycin  1,250 mg Intravenous Q12H    doxycycline hyclate  100 mg Oral 2 times per day    insulin lispro  0-12 Units Subcutaneous TID WC    insulin lispro  0-6 Units Subcutaneous Nightly    lidocaine-EPINEPHrine  20 mL Intradermal See Admin Instructions    sodium chloride flush  10 mL Intravenous BID    bacitracin   Topical TID    enoxaparin  40 mg Subcutaneous Daily     Continuous Infusions:   dextrose       PRN Meds:traMADol, glucose, dextrose, glucagon (rDNA), dextrose, acetaminophen, ondansetron, sodium chloride flush, vomiting, diarrhea or constipation or hematochezia   GENITOURINARY:  No frequency burning dysuria or hematuria. INTEGUMENT/BREAST:  No rash or breast masses. See hpi  HEMATOLOGIC/LYMPHATIC:  No lymphadenopathy or blood dyscrasics. ALLERGIC/IMMUNOLOGIC:  No anaphylaxis. ENDOCRINE:  No polyuria or polydipsia or temperature intolerance. MUSCULOSKELETAL:  No myalgia or arthralgia. Full ROM. NEUROLOGICAL:  No focal motor sensory deficit. BEHAVIOR/PSYCH:  No psychosis. PHYSICAL EXAM:    Vitals:    /68   Pulse 91   Temp 98.1 °F (36.7 °C) (Oral)   Resp 16   Ht 5' 1\" (1.549 m)   Wt 125 lb (56.7 kg)   LMP 04/27/2019   SpO2 100%   BMI 23.62 kg/m²   Constitutional: The patient is awake, alert, and oriented. Skin: Warm and dry AS/P area was red tender and warm   HEENT: Eyes show round, and reactive pupils. No jaundice. Moist mucous membranes, no ulcerations, no thrush. Neck: Supple to movements. No lymphadenopathy. Chest: No use of accessory muscles to breathe. Symmetrical expansion. Auscultation reveals no wheezing, crackles, or rhonchi. Cardiovascular: S1 and S2 are rhythmic and regular. No murmurs appreciated. Abdomen: Positive bowel sounds to auscultation. Benign to palpation. No masses felt. No hepatosplenomegaly. Extremities: No clubbing, no cyanosis, no edema.   Musculoskeletal: FROM  Neurological: no ocal  Lines: peripheral      CBC+dif:  Recent Labs     05/08/19  0409 05/09/19  0311 05/10/19  0930   WBC 9.4 11.3 16.1*   HGB 10.2* 10.2* 12.9   HCT 30.9* 30.0* 39.4   MCV 85.8 83.3 85.5    260 348   NEUTROABS 6.57 7.98* 13.36*     No results found for: CRP  No results found for: CRPHS  No results found for: SEDRATE  Lab Results   Component Value Date    ALT 19 05/07/2019    AST 20 05/07/2019    ALKPHOS 212 (H) 05/07/2019    BILITOT <0.2 05/07/2019     Lab Results   Component Value Date     05/10/2019    K 4.1 05/10/2019    K 5.0 05/07/2019     05/10/2019    CO2 19 05/10/2019    BUN 10 05/10/2019    CREATININE 0.6 05/10/2019    GFRAA >60 05/10/2019    LABGLOM >60 05/10/2019    GLUCOSE 274 05/10/2019    PROT 8.6 05/07/2019    LABALBU 4.1 05/07/2019    CALCIUM 8.6 05/10/2019    BILITOT <0.2 05/07/2019    ALKPHOS 212 05/07/2019    AST 20 05/07/2019    ALT 19 05/07/2019       Lab Results   Component Value Date    PROTIME 11.6 02/10/2019    INR 1.0 02/10/2019       No results found for: TSH    Lab Results   Component Value Date    NITRITE negative 04/17/2018    COLORU Yellow 05/07/2019    PHUR 5.0 05/07/2019    LABCAST FEW 01/22/2018    WBCUA 2-5 02/10/2019    RBCUA 1-3 02/10/2019    YEAST FEW 01/22/2018    BACTERIA RARE 02/10/2019    CLARITYU Clear 05/07/2019    SPECGRAV <=1.005 05/07/2019    LEUKOCYTESUR Negative 05/07/2019    UROBILINOGEN 0.2 05/07/2019    BILIRUBINUR Negative 05/07/2019    BILIRUBINUR negative 04/17/2018    BLOODU Negative 05/07/2019    GLUCOSEU 100 05/07/2019       No results found for: VGG8TCC, BEART, G2SZLVGU, PHART, THGBART, QFE9UUQ, PO2ART, ERS9LFF  Radiology:  US NON OB TRANSVAGINAL   Final Result   Unremarkable study of the pelvis. XR CHEST PORTABLE   Final Result   Normal chest and unchanged               CT ABDOMEN PELVIS WO CONTRAST   Final Result   Superficial skin thickening and contiguous infiltration/phlegmon   within the anterior subcutaneous fat in the pelvis. There is no   drainable abscess. Findings would suggest possible cellulitis. Microbiology:  Pending  No results for input(s): BC in the last 72 hours. No results for input(s): ORG in the last 72 hours. No results for input(s): [place] Go in the last 72 hours. No results for input(s): STREPNEUMAGU in the last 72 hours. No results for input(s): LP1UAG in the last 72 hours. No results for input(s): ASO in the last 72 hours. No results for input(s): CULTRESP in the last 72 hours.     Assessment:  · Cellulitis and abscess from furuncle  caused by shaving  · DKA    Plan: · Cont c vanco and change doxy to po for eventual d.c  · Check cultures  · Baseline ESR, CRP  · Monitor labs  · Will follow with you    Thank you for having us see this patient in consultation. I will be discussing this case with the treating physicians.       Electronically signed by Anjali Brown MD on 5/10/2019 at 1:20 PM

## 2019-05-10 NOTE — PROGRESS NOTES
glucose four times daily. She will be getting a CGM when she gets her insulin pump, which she is very excited about. I provided her with my contact information and encouraged her to call with any questions or concerns that may arise before she attends the diabetes classes. Diabetes medications reviewed (use, purpose, action, side effects):  Novolog, Basaglar     Evaluation/Plan/Recommendations:   Patient's understanding of diabetes:   []Poor   []Fair    [x]Good   [] Excellent     Outpatient diabetes education is recommended:   [x] Yes  [] No  Patient is interested in outpatient diabetes education:   [x] Yes    [] No    [] Unsure      Thank you for this consult.

## 2019-05-11 LAB
ANION GAP SERPL CALCULATED.3IONS-SCNC: 13 MMOL/L (ref 7–16)
BASOPHILS ABSOLUTE: 0.03 E9/L (ref 0–0.2)
BASOPHILS RELATIVE PERCENT: 0.3 % (ref 0–2)
BUN BLDV-MCNC: 12 MG/DL (ref 6–20)
CALCIUM SERPL-MCNC: 8.7 MG/DL (ref 8.6–10.2)
CHLORIDE BLD-SCNC: 107 MMOL/L (ref 98–107)
CO2: 24 MMOL/L (ref 22–29)
CREAT SERPL-MCNC: 0.5 MG/DL (ref 0.5–1)
EOSINOPHILS ABSOLUTE: 0.04 E9/L (ref 0.05–0.5)
EOSINOPHILS RELATIVE PERCENT: 0.4 % (ref 0–6)
GFR AFRICAN AMERICAN: >60
GFR NON-AFRICAN AMERICAN: >60 ML/MIN/1.73
GLUCOSE BLD-MCNC: 151 MG/DL (ref 74–99)
GRAM STAIN ORDERABLE: NORMAL
HCT VFR BLD CALC: 29.6 % (ref 34–48)
HEMOGLOBIN: 9.7 G/DL (ref 11.5–15.5)
IMMATURE GRANULOCYTES #: 0.1 E9/L
IMMATURE GRANULOCYTES %: 1 % (ref 0–5)
LYMPHOCYTES ABSOLUTE: 2.87 E9/L (ref 1.5–4)
LYMPHOCYTES RELATIVE PERCENT: 27.4 % (ref 20–42)
MAGNESIUM: 1.8 MG/DL (ref 1.6–2.6)
MCH RBC QN AUTO: 27.9 PG (ref 26–35)
MCHC RBC AUTO-ENTMCNC: 32.8 % (ref 32–34.5)
MCV RBC AUTO: 85.1 FL (ref 80–99.9)
METER GLUCOSE: 106 MG/DL (ref 74–99)
METER GLUCOSE: 110 MG/DL (ref 74–99)
METER GLUCOSE: 130 MG/DL (ref 74–99)
METER GLUCOSE: 144 MG/DL (ref 74–99)
MONOCYTES ABSOLUTE: 0.89 E9/L (ref 0.1–0.95)
MONOCYTES RELATIVE PERCENT: 8.5 % (ref 2–12)
NEUTROPHILS ABSOLUTE: 6.53 E9/L (ref 1.8–7.3)
NEUTROPHILS RELATIVE PERCENT: 62.4 % (ref 43–80)
PDW BLD-RTO: 13.2 FL (ref 11.5–15)
PHOSPHORUS: 3.8 MG/DL (ref 2.5–4.5)
PLATELET # BLD: 299 E9/L (ref 130–450)
PMV BLD AUTO: 10.1 FL (ref 7–12)
POTASSIUM SERPL-SCNC: 3.5 MMOL/L (ref 3.5–5)
RBC # BLD: 3.48 E12/L (ref 3.5–5.5)
SODIUM BLD-SCNC: 144 MMOL/L (ref 132–146)
WBC # BLD: 10.5 E9/L (ref 4.5–11.5)

## 2019-05-11 PROCEDURE — 6370000000 HC RX 637 (ALT 250 FOR IP): Performed by: SURGERY

## 2019-05-11 PROCEDURE — 6370000000 HC RX 637 (ALT 250 FOR IP): Performed by: INTERNAL MEDICINE

## 2019-05-11 PROCEDURE — 6370000000 HC RX 637 (ALT 250 FOR IP): Performed by: CLINICAL NURSE SPECIALIST

## 2019-05-11 PROCEDURE — 36415 COLL VENOUS BLD VENIPUNCTURE: CPT

## 2019-05-11 PROCEDURE — 2580000003 HC RX 258: Performed by: SPECIALIST

## 2019-05-11 PROCEDURE — 6370000000 HC RX 637 (ALT 250 FOR IP): Performed by: SPECIALIST

## 2019-05-11 PROCEDURE — 83735 ASSAY OF MAGNESIUM: CPT

## 2019-05-11 PROCEDURE — 6360000002 HC RX W HCPCS: Performed by: SPECIALIST

## 2019-05-11 PROCEDURE — 85025 COMPLETE CBC W/AUTO DIFF WBC: CPT

## 2019-05-11 PROCEDURE — 80048 BASIC METABOLIC PNL TOTAL CA: CPT

## 2019-05-11 PROCEDURE — 99232 SBSQ HOSP IP/OBS MODERATE 35: CPT | Performed by: INTERNAL MEDICINE

## 2019-05-11 PROCEDURE — 82962 GLUCOSE BLOOD TEST: CPT

## 2019-05-11 PROCEDURE — 2580000003 HC RX 258: Performed by: SURGERY

## 2019-05-11 PROCEDURE — 84100 ASSAY OF PHOSPHORUS: CPT

## 2019-05-11 PROCEDURE — 1200000000 HC SEMI PRIVATE

## 2019-05-11 RX ORDER — IBUPROFEN 400 MG/1
400 TABLET ORAL 3 TIMES DAILY
Status: COMPLETED | OUTPATIENT
Start: 2019-05-11 | End: 2019-05-12

## 2019-05-11 RX ORDER — FLUCONAZOLE 100 MG/1
200 TABLET ORAL DAILY
Status: DISCONTINUED | OUTPATIENT
Start: 2019-05-11 | End: 2019-05-13 | Stop reason: HOSPADM

## 2019-05-11 RX ORDER — OXYCODONE HYDROCHLORIDE AND ACETAMINOPHEN 5; 325 MG/1; MG/1
1 TABLET ORAL EVERY 4 HOURS PRN
Status: DISCONTINUED | OUTPATIENT
Start: 2019-05-11 | End: 2019-05-13 | Stop reason: HOSPADM

## 2019-05-11 RX ADMIN — BACITRACIN: 500 OINTMENT TOPICAL at 20:27

## 2019-05-11 RX ADMIN — OXYCODONE HYDROCHLORIDE AND ACETAMINOPHEN 1 TABLET: 5; 325 TABLET ORAL at 13:03

## 2019-05-11 RX ADMIN — Medication 10 ML: at 08:18

## 2019-05-11 RX ADMIN — INSULIN GLARGINE 28 UNITS: 100 INJECTION, SOLUTION SUBCUTANEOUS at 21:15

## 2019-05-11 RX ADMIN — VANCOMYCIN HYDROCHLORIDE 1250 MG: 10 INJECTION, POWDER, LYOPHILIZED, FOR SOLUTION INTRAVENOUS at 20:26

## 2019-05-11 RX ADMIN — BACITRACIN: 500 OINTMENT TOPICAL at 12:58

## 2019-05-11 RX ADMIN — VANCOMYCIN HYDROCHLORIDE 1250 MG: 10 INJECTION, POWDER, LYOPHILIZED, FOR SOLUTION INTRAVENOUS at 10:41

## 2019-05-11 RX ADMIN — IBUPROFEN 400 MG: 400 TABLET ORAL at 20:26

## 2019-05-11 RX ADMIN — IBUPROFEN 400 MG: 400 TABLET ORAL at 10:42

## 2019-05-11 RX ADMIN — DOXYCYCLINE HYCLATE 100 MG: 100 CAPSULE ORAL at 08:18

## 2019-05-11 RX ADMIN — TRAMADOL HYDROCHLORIDE 50 MG: 50 TABLET, FILM COATED ORAL at 05:32

## 2019-05-11 RX ADMIN — OXYCODONE HYDROCHLORIDE AND ACETAMINOPHEN 1 TABLET: 5; 325 TABLET ORAL at 09:15

## 2019-05-11 RX ADMIN — FLUCONAZOLE 200 MG: 100 TABLET ORAL at 17:02

## 2019-05-11 RX ADMIN — DOXYCYCLINE HYCLATE 100 MG: 100 CAPSULE ORAL at 20:26

## 2019-05-11 RX ADMIN — OXYCODONE HYDROCHLORIDE AND ACETAMINOPHEN 1 TABLET: 5; 325 TABLET ORAL at 21:15

## 2019-05-11 RX ADMIN — INSULIN LISPRO 2 UNITS: 100 INJECTION, SOLUTION INTRAVENOUS; SUBCUTANEOUS at 09:15

## 2019-05-11 RX ADMIN — OXYCODONE HYDROCHLORIDE AND ACETAMINOPHEN 1 TABLET: 5; 325 TABLET ORAL at 17:08

## 2019-05-11 RX ADMIN — OXYCODONE HYDROCHLORIDE AND ACETAMINOPHEN 1 TABLET: 5; 325 TABLET ORAL at 02:49

## 2019-05-11 RX ADMIN — Medication 10 ML: at 20:26

## 2019-05-11 RX ADMIN — IBUPROFEN 400 MG: 400 TABLET ORAL at 14:44

## 2019-05-11 ASSESSMENT — PAIN DESCRIPTION - LOCATION
LOCATION: PELVIS

## 2019-05-11 ASSESSMENT — PAIN DESCRIPTION - PROGRESSION
CLINICAL_PROGRESSION: RAPIDLY IMPROVING
CLINICAL_PROGRESSION: NOT CHANGED
CLINICAL_PROGRESSION: GRADUALLY WORSENING
CLINICAL_PROGRESSION: NOT CHANGED
CLINICAL_PROGRESSION: GRADUALLY IMPROVING
CLINICAL_PROGRESSION: GRADUALLY WORSENING

## 2019-05-11 ASSESSMENT — PAIN DESCRIPTION - FREQUENCY
FREQUENCY: CONTINUOUS

## 2019-05-11 ASSESSMENT — PAIN DESCRIPTION - PAIN TYPE
TYPE: SURGICAL PAIN

## 2019-05-11 ASSESSMENT — PAIN SCALES - GENERAL
PAINLEVEL_OUTOF10: 9
PAINLEVEL_OUTOF10: 7
PAINLEVEL_OUTOF10: 3
PAINLEVEL_OUTOF10: 10
PAINLEVEL_OUTOF10: 7
PAINLEVEL_OUTOF10: 4
PAINLEVEL_OUTOF10: 7
PAINLEVEL_OUTOF10: 8
PAINLEVEL_OUTOF10: 3
PAINLEVEL_OUTOF10: 10
PAINLEVEL_OUTOF10: 8
PAINLEVEL_OUTOF10: 6

## 2019-05-11 ASSESSMENT — PAIN DESCRIPTION - DESCRIPTORS
DESCRIPTORS: DISCOMFORT;THROBBING
DESCRIPTORS: DISCOMFORT;THROBBING
DESCRIPTORS: THROBBING;DISCOMFORT;ACHING
DESCRIPTORS: ACHING;DISCOMFORT;THROBBING
DESCRIPTORS: DISCOMFORT;PRESSURE;SORE

## 2019-05-11 ASSESSMENT — PAIN DESCRIPTION - ONSET
ONSET: ON-GOING

## 2019-05-11 ASSESSMENT — PAIN - FUNCTIONAL ASSESSMENT
PAIN_FUNCTIONAL_ASSESSMENT: PREVENTS OR INTERFERES SOME ACTIVE ACTIVITIES AND ADLS
PAIN_FUNCTIONAL_ASSESSMENT: PREVENTS OR INTERFERES SOME ACTIVE ACTIVITIES AND ADLS
PAIN_FUNCTIONAL_ASSESSMENT: ACTIVITIES ARE NOT PREVENTED
PAIN_FUNCTIONAL_ASSESSMENT: ACTIVITIES ARE NOT PREVENTED

## 2019-05-11 NOTE — PROGRESS NOTES
[address] [place] [address] [facility]  Progress Note    SUBJECTIVE:  Chief Complaint   Patient presents with    Diabetes     high blood sugar. dex 498 upon arrival      Patient is tolerating medications. No reported adverse drug reactions. No nausea, vomiting, diarrhea. Review of systems:  As stated above in the chief complaint, otherwise negative. Medications:  Scheduled Meds:   ibuprofen  400 mg Oral TID    insulin glargine  28 Units Subcutaneous Nightly    vancomycin  1,250 mg Intravenous Q12H    doxycycline hyclate  100 mg Oral 2 times per day    insulin lispro  0-12 Units Subcutaneous TID WC    insulin lispro  0-6 Units Subcutaneous Nightly    lidocaine-EPINEPHrine  20 mL Intradermal See Admin Instructions    sodium chloride flush  10 mL Intravenous BID    bacitracin   Topical TID    enoxaparin  40 mg Subcutaneous Daily     Continuous Infusions:   dextrose       PRN Meds:oxyCODONE-acetaminophen, glucose, dextrose, glucagon (rDNA), dextrose, acetaminophen, ondansetron, sodium chloride flush    OBJECTIVE:  /71   Pulse 103   Temp 98.3 °F (36.8 °C) (Oral)   Resp 14   Ht 5' 1\" (1.549 m)   Wt 125 lb (56.7 kg)   LMP 2019   SpO2 97%   BMI 23.62 kg/m²   Temp  Av.5 °F (36.9 °C)  Min: 98.3 °F (36.8 °C)  Max: 98.6 °F (37 °C)  Constitutional: The patient is awake, alert, and oriented. Skin: Warm and dry. No rashes were noted. HEENT: Eyes show round, and reactive pupils. No jaundice. Moist mucous membranes, no ulcerations, no thrush. Neck: Supple to movements. No lymphadenopathy. Chest: No use of accessory muscles to breathe. Symmetrical expansion. Auscultation reveals no wheezing, crackles, or rhonchi. Cardiovascular: S1 and S2 are rhythmic and regular. No murmurs appreciated. Abdomen: Positive bowel sounds to auscultation. Benign to palpation. No masses felt. No hepatosplenomegaly.   Pubic I&d site dressed, tender  Extremities: No clubbing, no cyanosis, Pt seen and examined. Above discussed agree with advanced practice nurse. Labs, cultures, and radiographs reviewed. Face to Face encounter occurred. Changes made as necessary.      Jadyn Armenta MD

## 2019-05-11 NOTE — PROGRESS NOTES
[facility] Hospitalist   Progress Note    Admitting Date and Time: 5/7/2019  8:33 AM  Admit Dx: DKA, type 1, not at goal ([facility] [address] [E10.10]  DKA, type 1, not at goal [facility]) [E10.10]  DKA, type 1, not at goal [facility]) [E10.10]    Subjective: Admitted on 5/7 with DKA, had abdominal pain,did have sepsis due to folliculitis, managed in ICU, now AG is 11. Patient has been seen by surgery, and has under gone  I & D with Insertion of Loop drain. Packing is changed. Patient also seen by ID. Patient was admitted with DKA, type 1, not at goal [facility]) [E10.10]  DKA, type 1, not at goal [facility]) [E10.10]  DKA, type 1, not at goal [facility]) [E10.10]. As of now, patient is awake, alert, comfortable. Does request stronger pain control. Per RN: About to start her antibiotic. ROS: denies fever, chills, cp, sob, n/v, HA unless stated above.      insulin glargine  28 Units Subcutaneous Nightly    vancomycin  1,250 mg Intravenous Q12H    doxycycline hyclate  100 mg Oral 2 times per day    insulin lispro  0-12 Units Subcutaneous TID WC    insulin lispro  0-6 Units Subcutaneous Nightly    lidocaine-EPINEPHrine  20 mL Intradermal See Admin Instructions    sodium chloride flush  10 mL Intravenous BID    bacitracin   Topical TID    enoxaparin  40 mg Subcutaneous Daily       traMADol 50 mg Q6H PRN   glucose 15 g PRN   dextrose 12.5 g PRN   glucagon (rDNA) 1 mg PRN   dextrose 100 mL/hr PRN   acetaminophen 650 mg Q4H PRN   ondansetron 4 mg Q6H PRN   sodium chloride flush 10 mL PRN   oxyCODONE-acetaminophen 1 tablet Q6H PRN        Objective:    /78   Pulse 104   Temp 98.6 °F (37 °C) (Oral)   Resp 16   Ht 5' 1\" (1.549 m)   Wt 125 lb (56.7 kg)   LMP 04/27/2019   SpO2 99%   BMI 23.62 kg/m²   General Appearance: alert and oriented to person, place and time, well-developed and well-nourished, in no acute distress  Skin: warm and dry, no rash or erythema  Head: normocephalic and atraumatic  Eyes: pupils equal, round, and reactive to light, extraocular eye movements intact, conjunctivae normal  ENT: tympanic membrane, external ear and ear canal normal bilaterally, oropharynx clear and moist with normal mucous membranes  Neck: neck supple and non tender without mass, no thyromegaly or thyroid nodules, no cervical lymphadenopathy   Pulmonary/Chest: clear to auscultation bilaterally- no wheezes, rales or rhonchi, normal air movement, no respiratory distress  Cardiovascular: normal rate, normal S1 and S2, no gallops, intact distal pulses and no carotid bruits  Abdomen: soft, non-tender, non-distended, normal bowel sounds, no masses or organomegaly    Local Exam  Induration + / Tender  Recent Labs     05/09/19  0311 05/10/19  0448 05/11/19  0525    137 144   K 4.1 4.1 3.5    104 107   CO2 20* 19* 24   BUN 4* 10 12   CREATININE 0.6 0.6 0.5   GLUCOSE 152* 274* 151*   CALCIUM 8.5* 8.6 8.7       Recent Labs     05/09/19  0311 05/10/19  0930 05/11/19  0525   WBC 11.3 16.1* 10.5   RBC 3.60 4.61 3.48*   HGB 10.2* 12.9 9.7*   HCT 30.0* 39.4 29.6*   MCV 83.3 85.5 85.1   MCH 28.3 28.0 27.9   MCHC 34.0 32.7 32.8   RDW 12.7 13.0 13.2    348 299   MPV 10.2 10.4 10.1     Leucocytosis improved  Hgb 9.7    Radiology:   US NON OB TRANSVAGINAL   Final Result   Unremarkable study of the pelvis. XR CHEST PORTABLE   Final Result   Normal chest and unchanged               CT ABDOMEN PELVIS WO CONTRAST   Final Result   Superficial skin thickening and contiguous infiltration/phlegmon   within the anterior subcutaneous fat in the pelvis. There is no   drainable abscess. Findings would suggest possible cellulitis. Assessment:    Active Problems:    DKA, type 1, not at goal ([address]    Sepsis ([address]    Bacterial folliculitis  Resolved Problems:    * No resolved hospital problems. *      Plan:  1. DKA improved / DC IVF   2. Pain / change Percocet to q4 / Add ATC Ibuprofen  3. Leucocytosis / Improved  4.  Folliculitis / abscess / had I & D / Management per surgery- has packing  5. On Doxycycline / and Vancomycin added yesterday / ID involved. 6. DM, Lanj carlosuss  28 /well controlled.    7. DC when ok from surgery and ID        Electronically signed by Reyes Jones, MD on 5/11/2019 at 8:14 AM

## 2019-05-12 LAB
ANION GAP SERPL CALCULATED.3IONS-SCNC: 10 MMOL/L (ref 7–16)
BASOPHILS ABSOLUTE: 0.04 E9/L (ref 0–0.2)
BASOPHILS RELATIVE PERCENT: 0.5 % (ref 0–2)
BLOOD CULTURE, ROUTINE: NORMAL
BUN BLDV-MCNC: 11 MG/DL (ref 6–20)
CALCIUM SERPL-MCNC: 9.2 MG/DL (ref 8.6–10.2)
CHLORIDE BLD-SCNC: 102 MMOL/L (ref 98–107)
CO2: 27 MMOL/L (ref 22–29)
CREAT SERPL-MCNC: 0.5 MG/DL (ref 0.5–1)
CULTURE SURGICAL: ABNORMAL
CULTURE, BLOOD 2: NORMAL
EOSINOPHILS ABSOLUTE: 0.07 E9/L (ref 0.05–0.5)
EOSINOPHILS RELATIVE PERCENT: 0.9 % (ref 0–6)
GFR AFRICAN AMERICAN: >60
GFR NON-AFRICAN AMERICAN: >60 ML/MIN/1.73
GLUCOSE BLD-MCNC: 123 MG/DL (ref 74–99)
GRAM STAIN RESULT: ABNORMAL
HCT VFR BLD CALC: 31.4 % (ref 34–48)
HEMOGLOBIN: 10 G/DL (ref 11.5–15.5)
IMMATURE GRANULOCYTES #: 0.08 E9/L
IMMATURE GRANULOCYTES %: 1 % (ref 0–5)
LYMPHOCYTES ABSOLUTE: 2.59 E9/L (ref 1.5–4)
LYMPHOCYTES RELATIVE PERCENT: 33.8 % (ref 20–42)
MCH RBC QN AUTO: 27.8 PG (ref 26–35)
MCHC RBC AUTO-ENTMCNC: 31.8 % (ref 32–34.5)
MCV RBC AUTO: 87.2 FL (ref 80–99.9)
METER GLUCOSE: 110 MG/DL (ref 74–99)
METER GLUCOSE: 111 MG/DL (ref 74–99)
METER GLUCOSE: 159 MG/DL (ref 74–99)
METER GLUCOSE: 89 MG/DL (ref 74–99)
MONOCYTES ABSOLUTE: 0.82 E9/L (ref 0.1–0.95)
MONOCYTES RELATIVE PERCENT: 10.7 % (ref 2–12)
NEUTROPHILS ABSOLUTE: 4.07 E9/L (ref 1.8–7.3)
NEUTROPHILS RELATIVE PERCENT: 53.1 % (ref 43–80)
ORGANISM: ABNORMAL
ORGANISM: ABNORMAL
PDW BLD-RTO: 13.2 FL (ref 11.5–15)
PLATELET # BLD: 344 E9/L (ref 130–450)
PMV BLD AUTO: 10.1 FL (ref 7–12)
POTASSIUM SERPL-SCNC: 3.7 MMOL/L (ref 3.5–5)
RBC # BLD: 3.6 E12/L (ref 3.5–5.5)
SODIUM BLD-SCNC: 139 MMOL/L (ref 132–146)
VANCOMYCIN TROUGH: 12.2 MCG/ML (ref 5–16)
WBC # BLD: 7.7 E9/L (ref 4.5–11.5)

## 2019-05-12 PROCEDURE — 1200000000 HC SEMI PRIVATE

## 2019-05-12 PROCEDURE — 6370000000 HC RX 637 (ALT 250 FOR IP): Performed by: SURGERY

## 2019-05-12 PROCEDURE — 99232 SBSQ HOSP IP/OBS MODERATE 35: CPT | Performed by: INTERNAL MEDICINE

## 2019-05-12 PROCEDURE — 2580000003 HC RX 258: Performed by: SPECIALIST

## 2019-05-12 PROCEDURE — 85025 COMPLETE CBC W/AUTO DIFF WBC: CPT

## 2019-05-12 PROCEDURE — 36415 COLL VENOUS BLD VENIPUNCTURE: CPT

## 2019-05-12 PROCEDURE — 2580000003 HC RX 258: Performed by: SURGERY

## 2019-05-12 PROCEDURE — 82962 GLUCOSE BLOOD TEST: CPT

## 2019-05-12 PROCEDURE — 6370000000 HC RX 637 (ALT 250 FOR IP): Performed by: CLINICAL NURSE SPECIALIST

## 2019-05-12 PROCEDURE — 6370000000 HC RX 637 (ALT 250 FOR IP): Performed by: SPECIALIST

## 2019-05-12 PROCEDURE — 6360000002 HC RX W HCPCS: Performed by: SPECIALIST

## 2019-05-12 PROCEDURE — 6370000000 HC RX 637 (ALT 250 FOR IP): Performed by: INTERNAL MEDICINE

## 2019-05-12 PROCEDURE — 80202 ASSAY OF VANCOMYCIN: CPT

## 2019-05-12 PROCEDURE — 80048 BASIC METABOLIC PNL TOTAL CA: CPT

## 2019-05-12 RX ADMIN — IBUPROFEN 400 MG: 400 TABLET ORAL at 13:49

## 2019-05-12 RX ADMIN — VANCOMYCIN HYDROCHLORIDE 1250 MG: 10 INJECTION, POWDER, LYOPHILIZED, FOR SOLUTION INTRAVENOUS at 09:09

## 2019-05-12 RX ADMIN — OXYCODONE HYDROCHLORIDE AND ACETAMINOPHEN 1 TABLET: 5; 325 TABLET ORAL at 01:46

## 2019-05-12 RX ADMIN — OXYCODONE HYDROCHLORIDE AND ACETAMINOPHEN 1 TABLET: 5; 325 TABLET ORAL at 22:57

## 2019-05-12 RX ADMIN — INSULIN LISPRO 2 UNITS: 100 INJECTION, SOLUTION INTRAVENOUS; SUBCUTANEOUS at 12:41

## 2019-05-12 RX ADMIN — FLUCONAZOLE 200 MG: 100 TABLET ORAL at 09:09

## 2019-05-12 RX ADMIN — OXYCODONE HYDROCHLORIDE AND ACETAMINOPHEN 1 TABLET: 5; 325 TABLET ORAL at 16:56

## 2019-05-12 RX ADMIN — Medication 10 ML: at 20:30

## 2019-05-12 RX ADMIN — VANCOMYCIN HYDROCHLORIDE 1250 MG: 10 INJECTION, POWDER, LYOPHILIZED, FOR SOLUTION INTRAVENOUS at 20:30

## 2019-05-12 RX ADMIN — OXYCODONE HYDROCHLORIDE AND ACETAMINOPHEN 1 TABLET: 5; 325 TABLET ORAL at 12:42

## 2019-05-12 RX ADMIN — OXYCODONE HYDROCHLORIDE AND ACETAMINOPHEN 1 TABLET: 5; 325 TABLET ORAL at 07:22

## 2019-05-12 RX ADMIN — DOXYCYCLINE HYCLATE 100 MG: 100 CAPSULE ORAL at 20:30

## 2019-05-12 RX ADMIN — IBUPROFEN 400 MG: 400 TABLET ORAL at 09:09

## 2019-05-12 RX ADMIN — BACITRACIN: 500 OINTMENT TOPICAL at 20:00

## 2019-05-12 RX ADMIN — DOXYCYCLINE HYCLATE 100 MG: 100 CAPSULE ORAL at 09:09

## 2019-05-12 RX ADMIN — Medication 10 ML: at 09:09

## 2019-05-12 RX ADMIN — INSULIN GLARGINE 28 UNITS: 100 INJECTION, SOLUTION SUBCUTANEOUS at 20:31

## 2019-05-12 RX ADMIN — BACITRACIN: 500 OINTMENT TOPICAL at 13:51

## 2019-05-12 RX ADMIN — IBUPROFEN 400 MG: 400 TABLET ORAL at 19:54

## 2019-05-12 ASSESSMENT — PAIN DESCRIPTION - FREQUENCY
FREQUENCY: INTERMITTENT
FREQUENCY: INTERMITTENT
FREQUENCY: CONTINUOUS
FREQUENCY: INTERMITTENT
FREQUENCY: INTERMITTENT

## 2019-05-12 ASSESSMENT — PAIN - FUNCTIONAL ASSESSMENT
PAIN_FUNCTIONAL_ASSESSMENT: ACTIVITIES ARE NOT PREVENTED

## 2019-05-12 ASSESSMENT — PAIN SCALES - GENERAL
PAINLEVEL_OUTOF10: 5
PAINLEVEL_OUTOF10: 0
PAINLEVEL_OUTOF10: 4
PAINLEVEL_OUTOF10: 6
PAINLEVEL_OUTOF10: 8
PAINLEVEL_OUTOF10: 10
PAINLEVEL_OUTOF10: 0
PAINLEVEL_OUTOF10: 10
PAINLEVEL_OUTOF10: 5
PAINLEVEL_OUTOF10: 0

## 2019-05-12 ASSESSMENT — PAIN DESCRIPTION - PAIN TYPE
TYPE: SURGICAL PAIN
TYPE: ACUTE PAIN
TYPE: SURGICAL PAIN
TYPE: ACUTE PAIN;SURGICAL PAIN
TYPE: SURGICAL PAIN

## 2019-05-12 ASSESSMENT — PAIN DESCRIPTION - ONSET
ONSET: GRADUAL
ONSET: ON-GOING

## 2019-05-12 ASSESSMENT — PAIN DESCRIPTION - ORIENTATION
ORIENTATION: MID

## 2019-05-12 ASSESSMENT — PAIN DESCRIPTION - DESCRIPTORS
DESCRIPTORS: ACHING
DESCRIPTORS: ACHING;DISCOMFORT;SHARP
DESCRIPTORS: ACHING
DESCRIPTORS: ACHING;DISCOMFORT;SHARP
DESCRIPTORS: ACHING

## 2019-05-12 ASSESSMENT — PAIN DESCRIPTION - LOCATION
LOCATION: GROIN

## 2019-05-12 ASSESSMENT — PAIN DESCRIPTION - PROGRESSION
CLINICAL_PROGRESSION: NOT CHANGED
CLINICAL_PROGRESSION: GRADUALLY IMPROVING
CLINICAL_PROGRESSION: GRADUALLY IMPROVING
CLINICAL_PROGRESSION: NOT CHANGED
CLINICAL_PROGRESSION: GRADUALLY IMPROVING

## 2019-05-12 NOTE — PLAN OF CARE
Problem: GLYCEMIA IMBALANCE  Goal: Knowledge of diabetes self-management  5/12/2019 0112 by Dontae Jones RN  Outcome: Ongoing  5/11/2019 1449 by Emerson Box RN  Outcome: Met This Shift     Problem: Wound:  Goal: Signs of wound healing will improve  Description  Signs of wound healing will improve     5/12/2019 0112 by Dontae Jones RN  Outcome: Ongoing  5/11/2019 1449 by Emerson Box RN  Outcome: Met This Shift     Problem: Pain:  Goal: Pain level will decrease  Description  Pain level will decrease     Pain level will decrease  5/12/2019 0112 by Dontae Jones RN  Outcome: Ongoing  5/11/2019 1449 by Emerson Box RN  Outcome: Met This Shift     Problem: Infection, Sepsis  Goal: Patient exhibits no signs of infection  5/12/2019 0112 by Dontae Jones RN  Outcome: Ongoing  5/11/2019 1449 by Emerson Box RN  Outcome: Met This Shift     Problem: Falls - Risk of:  Goal: Will remain free from falls  Description  Will remain free from falls  5/12/2019 0112 by Dontae Jones RN  Outcome: Ongoing  5/11/2019 1449 by Emerson Box RN  Outcome: Met This Shift  Goal: Absence of physical injury  Description  Absence of physical injury  5/12/2019 0112 by Dontae Jones RN  Outcome: Ongoing  5/11/2019 1449 by Emerson Box RN  Outcome: Met This Shift     Problem: Skin Integrity:  Goal: Will show no infection signs and symptoms  Description  Will show no infection signs and symptoms  Outcome: Ongoing  Goal: Absence of new skin breakdown  Description  Absence of new skin breakdown  Outcome: Ongoing     Problem: Pain:  Goal: Pain level will decrease  Description  Pain level will decrease     Pain level will decrease  5/12/2019 0112 by Dontae Jones RN  Outcome: Ongoing  5/11/2019 1449 by Emerson Box RN  Outcome: Met This Shift  Goal: Control of acute pain  Description  Control of acute pain  5/12/2019 0112 by Dontae Jones RN  Outcome: Ongoing  5/11/2019 1449 by Char Francis RN  Outcome: Met This Shift  Goal: Control of chronic pain  Description  Control of chronic pain  Outcome: Ongoing

## 2019-05-12 NOTE — PROGRESS NOTES
tender  Extremities: No clubbing, no cyanosis, no edema. Lines: peripheral    Laboratory and Tests Review:  Lab Results   Component Value Date    WBC 7.7 05/12/2019    WBC 10.5 05/11/2019    WBC 16.1 (H) 05/10/2019    HGB 10.0 (L) 05/12/2019    HCT 31.4 (L) 05/12/2019    MCV 87.2 05/12/2019     05/12/2019     Lab Results   Component Value Date    NEUTROABS 4.07 05/12/2019    NEUTROABS 6.53 05/11/2019    NEUTROABS 13.36 (H) 05/10/2019     No results found for: CRP  No results found for: CRPHS  No results found for: SEDRATE  Lab Results   Component Value Date    ALT 19 05/07/2019    AST 20 05/07/2019    ALKPHOS 212 (H) 05/07/2019    BILITOT <0.2 05/07/2019     Lab Results   Component Value Date     05/12/2019    K 3.7 05/12/2019    K 5.0 05/07/2019     05/12/2019    CO2 27 05/12/2019    BUN 11 05/12/2019    CREATININE 0.5 05/12/2019    CREATININE 0.5 05/11/2019    CREATININE 0.6 05/10/2019    GFRAA >60 05/12/2019    LABGLOM >60 05/12/2019    GLUCOSE 123 05/12/2019    PROT 8.6 05/07/2019    LABALBU 4.1 05/07/2019    CALCIUM 9.2 05/12/2019    BILITOT <0.2 05/07/2019    ALKPHOS 212 05/07/2019    AST 20 05/07/2019    ALT 19 05/07/2019     Radiology:      Microbiology:   SX cx with M-SA and yeast so far    No results for input(s): BC in the last 72 hours. No results for input(s): ORG in the last 72 hours. No results for input(s): Royetta Manner in the last 72 hours. No results for input(s): STREPNEUMAGU in the last 72 hours. No results for input(s): LP1UAG in the last 72 hours. No results for input(s): ASO in the last 72 hours. No results for input(s): CULTRESP in the last 72 hours. No results for input(s): LABURIN in the last 72 hours.       Assessment:  · Cellulitis and abscess from furuncle  caused by shaving - pubic line  · DKA  · Leukocytosis better   · MSSA and yeast pubic abscess     Plan:    · Cont  IV  vanco today--celia will stop   · Cont PO doxy and PO diflucan  · For PM trough  · Check

## 2019-05-12 NOTE — PLAN OF CARE
Problem: Wound:  Goal: Signs of wound healing will improve  Description  Signs of wound healing will improve     5/12/2019 1115 by David Silver RN  Outcome: Met This Shift  5/12/2019 0112 by Abdulaziz Conn RN  Outcome: Ongoing     Problem: Pain:  Goal: Pain level will decrease  Description  Pain level will decrease     Pain level will decrease  5/12/2019 1115 by David Silver RN  Outcome: Met This Shift  5/12/2019 0112 by Abdulaziz Conn RN  Outcome: Ongoing     Problem: Falls - Risk of:  Goal: Will remain free from falls  Description  Will remain free from falls  5/12/2019 1115 by David Silver RN  Outcome: Met This Shift  5/12/2019 0112 by Abdulaziz Conn RN  Outcome: Ongoing  Goal: Absence of physical injury  Description  Absence of physical injury  5/12/2019 1115 by David Silver RN  Outcome: Met This Shift  5/12/2019 0112 by Abdulaziz Conn RN  Outcome: Ongoing     Problem: Skin Integrity:  Goal: Will show no infection signs and symptoms  Description  Will show no infection signs and symptoms  5/12/2019 1115 by David Silver RN  Outcome: Met This Shift  5/12/2019 0112 by Abdulaziz Conn RN  Outcome: Ongoing  Goal: Absence of new skin breakdown  Description  Absence of new skin breakdown  5/12/2019 1115 by David Silver RN  Outcome: Met This Shift  5/12/2019 0112 by Abdulaziz Conn RN  Outcome: Ongoing     Problem: Pain:  Goal: Pain level will decrease  Description  Pain level will decrease     Pain level will decrease  5/12/2019 1115 by David Silver RN  Outcome: Met This Shift  5/12/2019 0112 by Abdulaziz Conn RN  Outcome: Ongoing

## 2019-05-12 NOTE — PROGRESS NOTES
distress  Skin: warm and dry, no rash or erythema  Head: normocephalic and atraumatic  Eyes: pupils equal, round, and reactive to light, extraocular eye movements intact, conjunctivae normal  ENT: tympanic membrane, external ear and ear canal normal bilaterally, oropharynx clear and moist with normal mucous membranes  Neck: neck supple and non tender without mass, no thyromegaly or thyroid nodules, no cervical lymphadenopathy   Pulmonary/Chest: clear to auscultation bilaterally- no wheezes, rales or rhonchi, normal air movement, no respiratory distress  Cardiovascular: normal rate, normal S1 and S2, no gallops, intact distal pulses and no carotid bruits  Abdomen: soft, non-tender, non-distended, normal bowel sounds, no masses or organomegaly    Local Exam  Induration + / Tender  Recent Labs     05/10/19  0448 05/11/19  0525 05/12/19  0600    144 139   K 4.1 3.5 3.7    107 102   CO2 19* 24 27   BUN 10 12 11   CREATININE 0.6 0.5 0.5   GLUCOSE 274* 151* 123*   CALCIUM 8.6 8.7 9.2       Recent Labs     05/10/19  0930 05/11/19  0525 05/12/19  0600   WBC 16.1* 10.5 7.7   RBC 4.61 3.48* 3.60   HGB 12.9 9.7* 10.0*   HCT 39.4 29.6* 31.4*   MCV 85.5 85.1 87.2   MCH 28.0 27.9 27.8   MCHC 32.7 32.8 31.8*   RDW 13.0 13.2 13.2    299 344   MPV 10.4 10.1 10.1     Leucocytosis improved    Radiology:   US NON OB TRANSVAGINAL   Final Result   Unremarkable study of the pelvis. XR CHEST PORTABLE   Final Result   Normal chest and unchanged               CT ABDOMEN PELVIS WO CONTRAST   Final Result   Superficial skin thickening and contiguous infiltration/phlegmon   within the anterior subcutaneous fat in the pelvis. There is no   drainable abscess. Findings would suggest possible cellulitis. Assessment:    Active Problems:    DKA, type 1, not at goal ([address]    Sepsis ([address]    Bacterial folliculitis  Resolved Problems:    * No resolved hospital problems. *      Plan:  1.  DKA improved / DC IVF 2. Pain / change Percocet to q4 / Add ATC Ibuprofen  3. Leucocytosis / Improved  4. Folliculitis / abscess / had I & D / Management per surgery- has packing  5. Patient knows wound care  6. On Doxycycline / and Vancomycin and Diflucan ID involved. 7. DM, Lantuss  28 /well controlled.    8. DC when ok  ID / likely later today on Po antibiotics / pain control        Electronically signed by Vita Bird MD on 5/12/2019 at 8:06 AM

## 2019-05-13 VITALS
HEIGHT: 61 IN | TEMPERATURE: 98.4 F | DIASTOLIC BLOOD PRESSURE: 93 MMHG | WEIGHT: 126.38 LBS | BODY MASS INDEX: 23.86 KG/M2 | SYSTOLIC BLOOD PRESSURE: 136 MMHG | OXYGEN SATURATION: 98 % | HEART RATE: 88 BPM | RESPIRATION RATE: 16 BRPM

## 2019-05-13 LAB
ANAEROBIC CULTURE: NORMAL
ANION GAP SERPL CALCULATED.3IONS-SCNC: 12 MMOL/L (ref 7–16)
BASOPHILS ABSOLUTE: 0.04 E9/L (ref 0–0.2)
BASOPHILS RELATIVE PERCENT: 0.5 % (ref 0–2)
BUN BLDV-MCNC: 7 MG/DL (ref 6–20)
CALCIUM SERPL-MCNC: 9.1 MG/DL (ref 8.6–10.2)
CHLORIDE BLD-SCNC: 100 MMOL/L (ref 98–107)
CO2: 29 MMOL/L (ref 22–29)
CREAT SERPL-MCNC: 0.4 MG/DL (ref 0.5–1)
CULTURE SURGICAL: ABNORMAL
EOSINOPHILS ABSOLUTE: 0.05 E9/L (ref 0.05–0.5)
EOSINOPHILS RELATIVE PERCENT: 0.6 % (ref 0–6)
GFR AFRICAN AMERICAN: >60
GFR NON-AFRICAN AMERICAN: >60 ML/MIN/1.73
GLUCOSE BLD-MCNC: 79 MG/DL (ref 74–99)
GRAM STAIN RESULT: ABNORMAL
HCT VFR BLD CALC: 33.7 % (ref 34–48)
HEMOGLOBIN: 11 G/DL (ref 11.5–15.5)
IMMATURE GRANULOCYTES #: 0.09 E9/L
IMMATURE GRANULOCYTES %: 1.1 % (ref 0–5)
LYMPHOCYTES ABSOLUTE: 2.27 E9/L (ref 1.5–4)
LYMPHOCYTES RELATIVE PERCENT: 26.7 % (ref 20–42)
MCH RBC QN AUTO: 28.2 PG (ref 26–35)
MCHC RBC AUTO-ENTMCNC: 32.6 % (ref 32–34.5)
MCV RBC AUTO: 86.4 FL (ref 80–99.9)
METER GLUCOSE: 210 MG/DL (ref 74–99)
METER GLUCOSE: 71 MG/DL (ref 74–99)
METER GLUCOSE: 75 MG/DL (ref 74–99)
METER GLUCOSE: 76 MG/DL (ref 74–99)
MONOCYTES ABSOLUTE: 0.75 E9/L (ref 0.1–0.95)
MONOCYTES RELATIVE PERCENT: 8.8 % (ref 2–12)
NEUTROPHILS ABSOLUTE: 5.3 E9/L (ref 1.8–7.3)
NEUTROPHILS RELATIVE PERCENT: 62.3 % (ref 43–80)
ORGANISM: ABNORMAL
PDW BLD-RTO: 12.9 FL (ref 11.5–15)
PLATELET # BLD: 362 E9/L (ref 130–450)
PMV BLD AUTO: 9.7 FL (ref 7–12)
POTASSIUM SERPL-SCNC: 3.3 MMOL/L (ref 3.5–5)
RBC # BLD: 3.9 E12/L (ref 3.5–5.5)
SODIUM BLD-SCNC: 141 MMOL/L (ref 132–146)
WBC # BLD: 8.5 E9/L (ref 4.5–11.5)

## 2019-05-13 PROCEDURE — 6370000000 HC RX 637 (ALT 250 FOR IP): Performed by: INTERNAL MEDICINE

## 2019-05-13 PROCEDURE — 36415 COLL VENOUS BLD VENIPUNCTURE: CPT

## 2019-05-13 PROCEDURE — 6370000000 HC RX 637 (ALT 250 FOR IP): Performed by: SPECIALIST

## 2019-05-13 PROCEDURE — 6360000002 HC RX W HCPCS: Performed by: SPECIALIST

## 2019-05-13 PROCEDURE — 82962 GLUCOSE BLOOD TEST: CPT

## 2019-05-13 PROCEDURE — 80048 BASIC METABOLIC PNL TOTAL CA: CPT

## 2019-05-13 PROCEDURE — 2580000003 HC RX 258: Performed by: SPECIALIST

## 2019-05-13 PROCEDURE — 6370000000 HC RX 637 (ALT 250 FOR IP): Performed by: CLINICAL NURSE SPECIALIST

## 2019-05-13 PROCEDURE — 6370000000 HC RX 637 (ALT 250 FOR IP): Performed by: SURGERY

## 2019-05-13 PROCEDURE — 2580000003 HC RX 258: Performed by: SURGERY

## 2019-05-13 PROCEDURE — 85025 COMPLETE CBC W/AUTO DIFF WBC: CPT

## 2019-05-13 PROCEDURE — 99239 HOSP IP/OBS DSCHRG MGMT >30: CPT | Performed by: INTERNAL MEDICINE

## 2019-05-13 RX ORDER — GINSENG 100 MG
CAPSULE ORAL
Qty: 30 G | Refills: 1 | Status: SHIPPED | OUTPATIENT
Start: 2019-05-13 | End: 2019-05-23

## 2019-05-13 RX ORDER — FLUCONAZOLE 200 MG/1
200 TABLET ORAL DAILY
Qty: 3 TABLET | Refills: 0 | Status: SHIPPED | OUTPATIENT
Start: 2019-05-14 | End: 2019-05-17

## 2019-05-13 RX ORDER — OXYCODONE HYDROCHLORIDE AND ACETAMINOPHEN 5; 325 MG/1; MG/1
1 TABLET ORAL EVERY 6 HOURS PRN
Qty: 12 TABLET | Refills: 0 | Status: SHIPPED | OUTPATIENT
Start: 2019-05-13 | End: 2019-05-16

## 2019-05-13 RX ORDER — FLUCONAZOLE 200 MG/1
200 TABLET ORAL DAILY
Qty: 7 TABLET | Refills: 0 | Status: SHIPPED | OUTPATIENT
Start: 2019-05-14 | End: 2019-05-13

## 2019-05-13 RX ADMIN — FLUCONAZOLE 200 MG: 100 TABLET ORAL at 08:46

## 2019-05-13 RX ADMIN — DOXYCYCLINE HYCLATE 100 MG: 100 CAPSULE ORAL at 08:46

## 2019-05-13 RX ADMIN — Medication 10 ML: at 08:46

## 2019-05-13 RX ADMIN — INSULIN LISPRO 4 UNITS: 100 INJECTION, SOLUTION INTRAVENOUS; SUBCUTANEOUS at 12:53

## 2019-05-13 RX ADMIN — BACITRACIN: 500 OINTMENT TOPICAL at 08:46

## 2019-05-13 RX ADMIN — OXYCODONE HYDROCHLORIDE AND ACETAMINOPHEN 1 TABLET: 5; 325 TABLET ORAL at 12:30

## 2019-05-13 RX ADMIN — OXYCODONE HYDROCHLORIDE AND ACETAMINOPHEN 1 TABLET: 5; 325 TABLET ORAL at 06:20

## 2019-05-13 RX ADMIN — OXYCODONE HYDROCHLORIDE AND ACETAMINOPHEN 1 TABLET: 5; 325 TABLET ORAL at 17:20

## 2019-05-13 RX ADMIN — VANCOMYCIN HYDROCHLORIDE 1250 MG: 10 INJECTION, POWDER, LYOPHILIZED, FOR SOLUTION INTRAVENOUS at 08:46

## 2019-05-13 ASSESSMENT — PAIN SCALES - GENERAL
PAINLEVEL_OUTOF10: 4
PAINLEVEL_OUTOF10: 10
PAINLEVEL_OUTOF10: 0
PAINLEVEL_OUTOF10: 0
PAINLEVEL_OUTOF10: 8
PAINLEVEL_OUTOF10: 8

## 2019-05-13 ASSESSMENT — PAIN DESCRIPTION - FREQUENCY
FREQUENCY: INTERMITTENT
FREQUENCY: CONTINUOUS

## 2019-05-13 ASSESSMENT — PAIN DESCRIPTION - ORIENTATION
ORIENTATION: MID
ORIENTATION: MID

## 2019-05-13 ASSESSMENT — PAIN DESCRIPTION - PROGRESSION
CLINICAL_PROGRESSION: NOT CHANGED
CLINICAL_PROGRESSION: NOT CHANGED

## 2019-05-13 ASSESSMENT — PAIN DESCRIPTION - PAIN TYPE
TYPE: SURGICAL PAIN
TYPE: SURGICAL PAIN

## 2019-05-13 ASSESSMENT — PAIN DESCRIPTION - ONSET
ONSET: ON-GOING
ONSET: GRADUAL

## 2019-05-13 ASSESSMENT — PAIN DESCRIPTION - LOCATION
LOCATION: GROIN
LOCATION: GROIN

## 2019-05-13 ASSESSMENT — PAIN - FUNCTIONAL ASSESSMENT
PAIN_FUNCTIONAL_ASSESSMENT: ACTIVITIES ARE NOT PREVENTED
PAIN_FUNCTIONAL_ASSESSMENT: ACTIVITIES ARE NOT PREVENTED

## 2019-05-13 ASSESSMENT — PAIN DESCRIPTION - DESCRIPTORS
DESCRIPTORS: ACHING;DISCOMFORT;SHOOTING
DESCRIPTORS: ACHING;SHOOTING

## 2019-05-13 NOTE — PROGRESS NOTES
[address] [place] [address] [facility]  Progress Note    SUBJECTIVE:  Chief Complaint   Patient presents with    Diabetes     high blood sugar. dex 498 upon arrival      Patient is tolerating medications. No reported adverse drug reactions. No nausea, vomiting, diarrhea. Review of systems:  As stated above in the chief complaint, otherwise negative. Medications:  Scheduled Meds:   fluconazole  200 mg Oral Daily    insulin glargine  28 Units Subcutaneous Nightly    vancomycin  1,250 mg Intravenous Q12H    doxycycline hyclate  100 mg Oral 2 times per day    insulin lispro  0-12 Units Subcutaneous TID WC    insulin lispro  0-6 Units Subcutaneous Nightly    lidocaine-EPINEPHrine  20 mL Intradermal See Admin Instructions    sodium chloride flush  10 mL Intravenous BID    bacitracin   Topical TID    enoxaparin  40 mg Subcutaneous Daily     Continuous Infusions:   dextrose       PRN Meds:oxyCODONE-acetaminophen, glucose, dextrose, glucagon (rDNA), dextrose, acetaminophen, ondansetron, sodium chloride flush    OBJECTIVE:  /84   Pulse 90   Temp 98 °F (36.7 °C) (Oral)   Resp 16   Ht 5' 1\" (1.549 m)   Wt 126 lb 6 oz (57.3 kg)   LMP 2019   SpO2 95%   BMI 23.88 kg/m²   Temp  Av °F (36.7 °C)  Min: 98 °F (36.7 °C)  Max: 98 °F (36.7 °C)  Constitutional: The patient is awake, alert, and oriented. Skin: Warm and dry. No rashes were noted. HEENT: Eyes show round, and reactive pupils. No jaundice. Moist mucous membranes, no ulcerations, no thrush. Neck: Supple to movements. No lymphadenopathy. Chest: No use of accessory muscles to breathe. Symmetrical expansion. Auscultation reveals no wheezing, crackles, or rhonchi. Cardiovascular: S1 and S2 are rhythmic and regular. No murmurs appreciated. Abdomen: Positive bowel sounds to auscultation. Benign to palpation. No masses felt. No hepatosplenomegaly.   Pubic I&d site dressed, tender  Extremities: No clubbing, no cyanosis, no edema. Lines: peripheral    Laboratory and Tests Review:  Lab Results   Component Value Date    WBC 8.5 05/13/2019    WBC 7.7 05/12/2019    WBC 10.5 05/11/2019    HGB 11.0 (L) 05/13/2019    HCT 33.7 (L) 05/13/2019    MCV 86.4 05/13/2019     05/13/2019     Lab Results   Component Value Date    NEUTROABS 5.30 05/13/2019    NEUTROABS 4.07 05/12/2019    NEUTROABS 6.53 05/11/2019     No results found for: CRP  No results found for: CRPHS  No results found for: SEDRATE  Lab Results   Component Value Date    ALT 19 05/07/2019    AST 20 05/07/2019    ALKPHOS 212 (H) 05/07/2019    BILITOT <0.2 05/07/2019     Lab Results   Component Value Date     05/13/2019    K 3.3 05/13/2019    K 5.0 05/07/2019     05/13/2019    CO2 29 05/13/2019    BUN 7 05/13/2019    CREATININE 0.4 05/13/2019    CREATININE 0.5 05/12/2019    CREATININE 0.5 05/11/2019    GFRAA >60 05/13/2019    LABGLOM >60 05/13/2019    GLUCOSE 79 05/13/2019    PROT 8.6 05/07/2019    LABALBU 4.1 05/07/2019    CALCIUM 9.1 05/13/2019    BILITOT <0.2 05/07/2019    ALKPHOS 212 05/07/2019    AST 20 05/07/2019    ALT 19 05/07/2019     Radiology:      Microbiology:   SX cx with M-SA and yeast so far    No results for input(s): BC in the last 72 hours. No results for input(s): ORG in the last 72 hours. No results for input(s): Ely Rung in the last 72 hours. No results for input(s): STREPNEUMAGU in the last 72 hours. No results for input(s): LP1UAG in the last 72 hours. No results for input(s): ASO in the last 72 hours. No results for input(s): CULTRESP in the last 72 hours. No results for input(s): LABURIN in the last 72 hours.       Assessment:  · Cellulitis and abscess from furuncle  caused by shaving - pubic line  · DKA  · Leukocytosis better   · MSSA and yeast pubic abscess     Plan:    · STOP vanco today--  · Cont PO doxy and PO diflucan  · For PM trough  · Check cultures prelim staph and yeast   · Monitor labs  · Will follow with you-- OK TO  HAILEE Villalobos  1:30 PM  5/13/2019

## 2019-05-13 NOTE — DISCHARGE SUMMARY
[facility] EMERGENCY SERVICE Physician Discharge Summary       Yaniv Millan DO  [address] [facility],Fourth Floor [place] [address]  [phone]          [address]   [place] [address]  [phone]          Activity level: as tolerated    Diet: Dietary Nutrition Supplements: Diabetic Oral Supplement  DIET CARB CONTROL; Carb Control: 3 carb choices (45 gms)/meal    Labs:    Dispo:home    Condition at discharge: stable    Continue supplemental oxygen via nasal canula @ 2 LPM round-the-clock. Continue CPAP / BiPAP during sleep as prior to admission. Patient ID:  Ashu Sarmiento  [id number]  86 y.o.  1995    Admit date: 5/7/2019    Discharge date and time:  5/13/2019  2:19 PM    Admission Diagnoses: Active Problems:    DKA, type 1, not at goal ([address]    Sepsis ([address]    Bacterial folliculitis  Resolved Problems:    * No resolved hospital problems. *      Discharge Diagnoses: Active Problems:    DKA, type 1, not at goal ([address]    Sepsis ([address]    Bacterial folliculitis  Resolved Problems:    * No resolved hospital problems. *      Consults:  IP CONSULT TO IV TEAM  IP CONSULT TO CRITICAL CARE  IP CONSULT TO INTERNAL MEDICINE  IP CONSULT TO DIABETES EDUCATOR  IP CONSULT TO DIETITIAN  IP CONSULT TO GENERAL SURGERY  IP CONSULT TO INFECTIOUS DISEASES  IP CONSULT TO IV TEAM    Procedures: I & D    Hospital Course: Patient was admitted with DKA, type 1, not at goal ([address] [E10.10]  DKA, type 1, not at goal ([address] [E10.10]  DKA, type 1, not at goal ([address] [E10.10]. Patient came with DKA, did have evidence of folliculitis, dka corrected with fluids and IV insulin. Patient who had evidence of abscess was seen by surgery, did under go I & D. Patient also seen by ID, treated with Vanco and Doxy. Also received Diflucan. Patient has improved and ok for DC from ID as well as surgery. Paient with still packing in has multiple questions pertaining to wound. Will DC once able to speak to surgery. Will need C. Discharge Exam:  Vitals:    05/12/19 0721 05/12/19 2200 05/13/19 0108 05/13/19 0745   BP: (!) 125/91 129/89  128/84   Pulse: 94 79  90   Resp: 16 16  16   Temp: 98.3 °F (36.8 °C) 98 °F (36.7 °C)  97.8 °F (36.6 °C)   TempSrc: Oral Oral  Oral   SpO2: 97%   95%   Weight:   126 lb 6 oz (57.3 kg)    Height:           General Appearance: alert and oriented to person, place and time, well-developed and well-nourished, in no acute distress  Skin: warm and dry, no rash or erythema  Head: normocephalic and atraumatic  Eyes: pupils equal, round, and reactive to light, extraocular eye movements intact, conjunctivae normal  ENT: tympanic membrane, external ear and ear canal normal bilaterally, oropharynx clear and moist with normal mucous membranes  Neck: neck supple and non tender without mass, no thyromegaly or thyroid nodules, no cervical lymphadenopathy   Pulmonary/Chest: clear to auscultation bilaterally- no wheezes, rales or rhonchi, normal air movement, no respiratory distress  Cardiovascular: normal rate, normal S1 and S2, no gallops, intact distal pulses and no carotid bruits  Abdomen: soft, non-tender, non-distended, normal bowel sounds, no masses or organomegaly  No intake/output data recorded. I/O this shift:  In: 240 [P.O.:240]  Out: -       LABS:  Recent Labs     05/11/19 0525 05/12/19  0600 05/13/19  0745    139 141   K 3.5 3.7 3.3*    102 100   CO2 24 27 29   BUN 12 11 7   CREATININE 0.5 0.5 0.4*   GLUCOSE 151* 123* 79   CALCIUM 8.7 9.2 9.1       Recent Labs     05/11/19  0525 05/12/19  0600 05/13/19  0745   WBC 10.5 7.7 8.5   RBC 3.48* 3.60 3.90   HGB 9.7* 10.0* 11.0*   HCT 29.6* 31.4* 33.7*   MCV 85.1 87.2 86.4   MCH 27.9 27.8 28.2   MCHC 32.8 31.8* 32.6   RDW 13.2 13.2 12.9    344 362   MPV 10.1 10.1 9.7       No results for input(s): POCGLU in the last 72 hours. Imaging:   US NON OB TRANSVAGINAL   Final Result   Unremarkable study of the pelvis. XR CHEST PORTABLE   Final Result   Normal chest and unchanged               CT ABDOMEN PELVIS WO CONTRAST   Final Result   Superficial skin thickening and contiguous infiltration/phlegmon   within the anterior subcutaneous fat in the pelvis. There is no   drainable abscess. Findings would suggest possible cellulitis. Patient Instructions:      Medication List      START taking these medications    bacitracin 500 UNIT/GM ointment  Apply topically 2 times daily. doxycycline hyclate 100 MG capsule  Commonly known as:  VIBRAMYCIN  Take 1 capsule by mouth every 12 hours for 10 days     fluconazole 200 MG tablet  Commonly known as:  DIFLUCAN  Take 1 tablet by mouth daily for 3 days  Start taking on:  5/14/2019     oxyCODONE-acetaminophen 5-325 MG per tablet  Commonly known as:  PERCOCET  Take 1 tablet by mouth every 6 hours as needed for Pain for up to 3 days. CHANGE how you take these medications    insulin glargine 100 UNIT/ML injection pen  Commonly known as:  BASAGLAR KWIKPEN  Inject 25 Units into the skin nightly  What changed:  how much to take     NOVOLOG FLEXPEN 100 UNIT/ML injection pen  Generic drug:  insulin aspart  What changed:  Another medication with the same name was removed. Continue taking this medication, and follow the directions you see here.         CONTINUE taking these medications    acetone (urine) test strip     BD PEN NEEDLE SHELBIE U/F 32G X 4 MM Misc  Generic drug:  Insulin Pen Needle     FREESTYLE LITE strip  Generic drug:  blood glucose test strips     ibuprofen 200 MG tablet  Commonly known as:  ADVIL;MOTRIN        STOP taking these medications    cephALEXin 500 MG capsule  Commonly known as:  KEFLEX     mupirocin 2 % ointment  Commonly known as:  Dalia Celis           Where to Get Your Medications      These medications were sent to [facility]/pharmacy #[id number]- [place], [address] [place] [address] Raoul ENRIQUE [facility]  [address] [facility], [address] Phone:  [phone]   · bacitracin 500 UNIT/GM ointment  · fluconazole 200 MG tablet     You can get these medications from any pharmacy    Bring a paper prescription for each of these medications  · doxycycline hyclate 100 MG capsule  · oxyCODONE-acetaminophen 5-325 MG per tablet           Note that more than 30 minutes was spent in preparing discharge papers, discussing discharge with patient, medication review, etc.    Signed:  Electronically signed by Emma Riedel, MD on 5/13/2019 at 2:19 PM    NOTE: This report was transcribed using voice recognition software. Every effort was made to ensure accuracy; however, inadvertent computerized transcription errors may be present.

## 2019-05-13 NOTE — CARE COORDINATION
Social Work/Discharge Planning:  Met with patient and discussed home health care option. Patient is agreeable to home health care at discharge and provided patient with [place] [phone] choice list to review. Notified RN patient will need a C order. Will continue to follow.   Electronically signed by PERNELL Villanueva on 5/13/2019 at 9:31 AM

## 2019-05-13 NOTE — CARE COORDINATION
Social Work/Discharge Planning:  Patient states she prefers [facility]. Referral made to Arielle Willis with [facility]. Patient will need a home health care order. Will continue to follow.   Electronically signed by PERNELL [place] on 5/13/2019 at 1:52 PM

## 2019-05-13 NOTE — PROGRESS NOTES
[facility] Quality Flow/Interdisciplinary Rounds Progress Note        Quality Flow Rounds held on May 13, 2019    Disciplines Attending:  Bedside Nurse, ,  and Nursing Unit Leadership    Nahomi Ames was admitted on 5/7/2019  8:33 AM    Anticipated Discharge Date:  Expected Discharge Date: 05/10/19    Disposition:    Gavino Score:  Gavino Scale Score: 22    Readmission Risk              Risk of Unplanned Readmission:        22           Discussed patient goal for the day, patient clinical progression, and barriers to discharge.   The following Goal(s) of the Day/Commitment(s) have been identified:  Change to oral ATB & d/c planning      [facility]  May 13, 2019

## 2019-06-10 LAB
FUNGUS (MYCOLOGY) CULTURE: ABNORMAL
FUNGUS (MYCOLOGY) CULTURE: ABNORMAL
FUNGUS STAIN: ABNORMAL
ORGANISM: ABNORMAL

## 2019-06-25 LAB
AFB CULTURE (MYCOBACTERIA): NORMAL
AFB SMEAR: NORMAL

## 2019-08-05 ENCOUNTER — APPOINTMENT (OUTPATIENT)
Dept: GENERAL RADIOLOGY | Age: 24
DRG: 420 | End: 2019-08-05
Payer: COMMERCIAL

## 2019-08-05 ENCOUNTER — HOSPITAL ENCOUNTER (INPATIENT)
Age: 24
LOS: 2 days | Discharge: HOME | DRG: 420 | End: 2019-08-07
Attending: EMERGENCY MEDICINE | Admitting: INTERNAL MEDICINE
Payer: COMMERCIAL

## 2019-08-05 DIAGNOSIS — E10.10 DIABETIC KETOACIDOSIS WITHOUT COMA ASSOCIATED WITH TYPE 1 DIABETES MELLITUS (HCC): Primary | ICD-10-CM

## 2019-08-05 DIAGNOSIS — Z91.14 HISTORY OF MEDICATION NONCOMPLIANCE: ICD-10-CM

## 2019-08-05 PROBLEM — D72.829 LEUKOCYTOSIS: Status: ACTIVE | Noted: 2019-08-05

## 2019-08-05 LAB
ALBUMIN SERPL-MCNC: 4.6 G/DL (ref 3.5–5.2)
ALP BLD-CCNC: 121 U/L (ref 35–104)
ALT SERPL-CCNC: 23 U/L (ref 0–32)
ANION GAP SERPL CALCULATED.3IONS-SCNC: 33 MMOL/L (ref 7–16)
AST SERPL-CCNC: 21 U/L (ref 0–31)
BACTERIA: ABNORMAL /HPF
BASOPHILS ABSOLUTE: 0.11 E9/L (ref 0–0.2)
BASOPHILS RELATIVE PERCENT: 0.6 % (ref 0–2)
BETA-HYDROXYBUTYRATE: >4.5 MMOL/L (ref 0.02–0.27)
BILIRUB SERPL-MCNC: <0.2 MG/DL (ref 0–1.2)
BILIRUBIN URINE: NEGATIVE
BLOOD, URINE: ABNORMAL
BUN BLDV-MCNC: 14 MG/DL (ref 6–20)
CALCIUM SERPL-MCNC: 9.8 MG/DL (ref 8.6–10.2)
CHLORIDE BLD-SCNC: 97 MMOL/L (ref 98–107)
CHP ED QC CHECK: NORMAL
CHP ED QC CHECK: NORMAL
CLARITY: CLEAR
CO2: 4 MMOL/L (ref 22–29)
COLOR: YELLOW
CREAT SERPL-MCNC: 0.7 MG/DL (ref 0.5–1)
EOSINOPHILS ABSOLUTE: 0.01 E9/L (ref 0.05–0.5)
EOSINOPHILS RELATIVE PERCENT: 0.1 % (ref 0–6)
EPITHELIAL CELLS, UA: ABNORMAL /HPF
GFR AFRICAN AMERICAN: >60
GFR NON-AFRICAN AMERICAN: >60 ML/MIN/1.73
GLUCOSE BLD-MCNC: 431 MG/DL
GLUCOSE BLD-MCNC: 451 MG/DL
GLUCOSE BLD-MCNC: 605 MG/DL (ref 74–99)
GLUCOSE URINE: >=1000 MG/DL
HCG, URINE, POC: NEGATIVE
HCT VFR BLD CALC: 48.7 % (ref 34–48)
HEMOGLOBIN: 15.4 G/DL (ref 11.5–15.5)
IMMATURE GRANULOCYTES #: 0.17 E9/L
IMMATURE GRANULOCYTES %: 0.9 % (ref 0–5)
KETONES, URINE: >=80 MG/DL
LACTIC ACID, SEPSIS: 2.1 MMOL/L (ref 0.5–1.9)
LEUKOCYTE ESTERASE, URINE: NEGATIVE
LIPASE: 7 U/L (ref 13–60)
LYMPHOCYTES ABSOLUTE: 4.85 E9/L (ref 1.5–4)
LYMPHOCYTES RELATIVE PERCENT: 25 % (ref 20–42)
Lab: NORMAL
MCH RBC QN AUTO: 28.6 PG (ref 26–35)
MCHC RBC AUTO-ENTMCNC: 31.6 % (ref 32–34.5)
MCV RBC AUTO: 90.4 FL (ref 80–99.9)
METER GLUCOSE: 431 MG/DL (ref 74–99)
METER GLUCOSE: 443 MG/DL (ref 74–99)
METER GLUCOSE: 451 MG/DL (ref 74–99)
MONOCYTES ABSOLUTE: 0.9 E9/L (ref 0.1–0.95)
MONOCYTES RELATIVE PERCENT: 4.6 % (ref 2–12)
NEGATIVE QC PASS/FAIL: NORMAL
NEUTROPHILS ABSOLUTE: 13.35 E9/L (ref 1.8–7.3)
NEUTROPHILS RELATIVE PERCENT: 68.8 % (ref 43–80)
NITRITE, URINE: NEGATIVE
PDW BLD-RTO: 13.2 FL (ref 11.5–15)
PH UA: 5.5 (ref 5–9)
PH VENOUS: 6.94 (ref 7.35–7.45)
PLATELET # BLD: 373 E9/L (ref 130–450)
PMV BLD AUTO: 12 FL (ref 7–12)
POSITIVE QC PASS/FAIL: NORMAL
POTASSIUM REFLEX MAGNESIUM: 5.3 MMOL/L (ref 3.5–5)
PROTEIN UA: 30 MG/DL
RBC # BLD: 5.39 E12/L (ref 3.5–5.5)
RBC UA: ABNORMAL /HPF (ref 0–2)
SODIUM BLD-SCNC: 134 MMOL/L (ref 132–146)
SPECIFIC GRAVITY UA: 1.02 (ref 1–1.03)
TOTAL PROTEIN: 9 G/DL (ref 6.4–8.3)
UROBILINOGEN, URINE: 0.2 E.U./DL
WBC # BLD: 19.4 E9/L (ref 4.5–11.5)
WBC UA: ABNORMAL /HPF (ref 0–5)

## 2019-08-05 PROCEDURE — 6360000002 HC RX W HCPCS: Performed by: INTERNAL MEDICINE

## 2019-08-05 PROCEDURE — 82800 BLOOD PH: CPT

## 2019-08-05 PROCEDURE — 36556 INSERT NON-TUNNEL CV CATH: CPT

## 2019-08-05 PROCEDURE — 36592 COLLECT BLOOD FROM PICC: CPT

## 2019-08-05 PROCEDURE — 83605 ASSAY OF LACTIC ACID: CPT

## 2019-08-05 PROCEDURE — 82010 KETONE BODYS QUAN: CPT

## 2019-08-05 PROCEDURE — 81001 URINALYSIS AUTO W/SCOPE: CPT

## 2019-08-05 PROCEDURE — 80053 COMPREHEN METABOLIC PANEL: CPT

## 2019-08-05 PROCEDURE — 85025 COMPLETE CBC W/AUTO DIFF WBC: CPT

## 2019-08-05 PROCEDURE — 6360000002 HC RX W HCPCS: Performed by: EMERGENCY MEDICINE

## 2019-08-05 PROCEDURE — 96374 THER/PROPH/DIAG INJ IV PUSH: CPT

## 2019-08-05 PROCEDURE — 99285 EMERGENCY DEPT VISIT HI MDM: CPT

## 2019-08-05 PROCEDURE — 2000000000 HC ICU R&B

## 2019-08-05 PROCEDURE — 87040 BLOOD CULTURE FOR BACTERIA: CPT

## 2019-08-05 PROCEDURE — 82962 GLUCOSE BLOOD TEST: CPT

## 2019-08-05 PROCEDURE — 84100 ASSAY OF PHOSPHORUS: CPT

## 2019-08-05 PROCEDURE — 96375 TX/PRO/DX INJ NEW DRUG ADDON: CPT

## 2019-08-05 PROCEDURE — 2580000003 HC RX 258: Performed by: INTERNAL MEDICINE

## 2019-08-05 PROCEDURE — 36415 COLL VENOUS BLD VENIPUNCTURE: CPT

## 2019-08-05 PROCEDURE — 2580000003 HC RX 258: Performed by: EMERGENCY MEDICINE

## 2019-08-05 PROCEDURE — 83690 ASSAY OF LIPASE: CPT

## 2019-08-05 PROCEDURE — 83735 ASSAY OF MAGNESIUM: CPT

## 2019-08-05 PROCEDURE — 6370000000 HC RX 637 (ALT 250 FOR IP): Performed by: EMERGENCY MEDICINE

## 2019-08-05 PROCEDURE — 99223 1ST HOSP IP/OBS HIGH 75: CPT | Performed by: INTERNAL MEDICINE

## 2019-08-05 PROCEDURE — 80048 BASIC METABOLIC PNL TOTAL CA: CPT

## 2019-08-05 PROCEDURE — 71045 X-RAY EXAM CHEST 1 VIEW: CPT

## 2019-08-05 RX ORDER — PROMETHAZINE HYDROCHLORIDE 25 MG/ML
6.25 INJECTION, SOLUTION INTRAMUSCULAR; INTRAVENOUS EVERY 6 HOURS PRN
Status: DISCONTINUED | OUTPATIENT
Start: 2019-08-05 | End: 2019-08-07 | Stop reason: HOSPADM

## 2019-08-05 RX ORDER — SODIUM CHLORIDE 9 MG/ML
INJECTION, SOLUTION INTRAVENOUS CONTINUOUS
Status: DISCONTINUED | OUTPATIENT
Start: 2019-08-05 | End: 2019-08-06

## 2019-08-05 RX ORDER — 0.9 % SODIUM CHLORIDE 0.9 %
2000 INTRAVENOUS SOLUTION INTRAVENOUS ONCE
Status: COMPLETED | OUTPATIENT
Start: 2019-08-05 | End: 2019-08-05

## 2019-08-05 RX ORDER — ONDANSETRON 2 MG/ML
4 INJECTION INTRAMUSCULAR; INTRAVENOUS ONCE
Status: COMPLETED | OUTPATIENT
Start: 2019-08-05 | End: 2019-08-05

## 2019-08-05 RX ORDER — MORPHINE SULFATE 2 MG/ML
2 INJECTION, SOLUTION INTRAMUSCULAR; INTRAVENOUS
Status: COMPLETED | OUTPATIENT
Start: 2019-08-05 | End: 2019-08-07

## 2019-08-05 RX ORDER — SODIUM CHLORIDE 0.9 % (FLUSH) 0.9 %
10 SYRINGE (ML) INJECTION 2 TIMES DAILY
Status: DISCONTINUED | OUTPATIENT
Start: 2019-08-05 | End: 2019-08-07 | Stop reason: HOSPADM

## 2019-08-05 RX ORDER — DEXTROSE MONOHYDRATE 25 G/50ML
12.5 INJECTION, SOLUTION INTRAVENOUS PRN
Status: DISCONTINUED | OUTPATIENT
Start: 2019-08-05 | End: 2019-08-07 | Stop reason: HOSPADM

## 2019-08-05 RX ORDER — MAGNESIUM SULFATE 1 G/100ML
1 INJECTION INTRAVENOUS PRN
Status: DISCONTINUED | OUTPATIENT
Start: 2019-08-05 | End: 2019-08-06

## 2019-08-05 RX ORDER — MORPHINE SULFATE 2 MG/ML
2 INJECTION, SOLUTION INTRAMUSCULAR; INTRAVENOUS ONCE
Status: COMPLETED | OUTPATIENT
Start: 2019-08-05 | End: 2019-08-05

## 2019-08-05 RX ORDER — KETOROLAC TROMETHAMINE 30 MG/ML
15 INJECTION, SOLUTION INTRAMUSCULAR; INTRAVENOUS ONCE
Status: COMPLETED | OUTPATIENT
Start: 2019-08-05 | End: 2019-08-05

## 2019-08-05 RX ORDER — KETOROLAC TROMETHAMINE 30 MG/ML
15 INJECTION, SOLUTION INTRAMUSCULAR; INTRAVENOUS EVERY 6 HOURS PRN
Status: COMPLETED | OUTPATIENT
Start: 2019-08-06 | End: 2019-08-07

## 2019-08-05 RX ORDER — DIPHENHYDRAMINE HYDROCHLORIDE 50 MG/ML
25 INJECTION INTRAMUSCULAR; INTRAVENOUS ONCE
Status: COMPLETED | OUTPATIENT
Start: 2019-08-05 | End: 2019-08-05

## 2019-08-05 RX ORDER — DEXTROSE AND SODIUM CHLORIDE 5; .45 G/100ML; G/100ML
INJECTION, SOLUTION INTRAVENOUS CONTINUOUS PRN
Status: DISCONTINUED | OUTPATIENT
Start: 2019-08-05 | End: 2019-08-06

## 2019-08-05 RX ORDER — POTASSIUM CHLORIDE 7.45 MG/ML
10 INJECTION INTRAVENOUS PRN
Status: DISCONTINUED | OUTPATIENT
Start: 2019-08-05 | End: 2019-08-06

## 2019-08-05 RX ADMIN — MORPHINE SULFATE 2 MG: 2 INJECTION, SOLUTION INTRAMUSCULAR; INTRAVENOUS at 21:30

## 2019-08-05 RX ADMIN — SODIUM CHLORIDE 2000 ML: 9 INJECTION, SOLUTION INTRAVENOUS at 19:42

## 2019-08-05 RX ADMIN — SODIUM CHLORIDE: 9 INJECTION, SOLUTION INTRAVENOUS at 21:50

## 2019-08-05 RX ADMIN — KETOROLAC TROMETHAMINE 15 MG: 30 INJECTION, SOLUTION INTRAMUSCULAR at 20:04

## 2019-08-05 RX ADMIN — SODIUM CHLORIDE 0.1 UNITS/KG/HR: 9 INJECTION, SOLUTION INTRAVENOUS at 21:51

## 2019-08-05 RX ADMIN — ONDANSETRON 4 MG: 2 INJECTION INTRAMUSCULAR; INTRAVENOUS at 19:41

## 2019-08-05 RX ADMIN — INSULIN HUMAN 5 UNITS: 100 INJECTION, SOLUTION PARENTERAL at 21:30

## 2019-08-05 RX ADMIN — Medication 10 ML: at 23:38

## 2019-08-05 RX ADMIN — ONDANSETRON 4 MG: 2 INJECTION INTRAMUSCULAR; INTRAVENOUS at 22:06

## 2019-08-05 RX ADMIN — MORPHINE SULFATE 2 MG: 2 INJECTION, SOLUTION INTRAMUSCULAR; INTRAVENOUS at 23:37

## 2019-08-05 RX ADMIN — DIPHENHYDRAMINE HYDROCHLORIDE 25 MG: 50 INJECTION, SOLUTION INTRAMUSCULAR; INTRAVENOUS at 23:37

## 2019-08-05 ASSESSMENT — PAIN DESCRIPTION - PROGRESSION
CLINICAL_PROGRESSION: NOT CHANGED
CLINICAL_PROGRESSION: GRADUALLY WORSENING

## 2019-08-05 ASSESSMENT — PAIN DESCRIPTION - ORIENTATION
ORIENTATION: RIGHT;LEFT;MID
ORIENTATION: RIGHT;LEFT
ORIENTATION: RIGHT;LEFT;MID

## 2019-08-05 ASSESSMENT — PAIN SCALES - GENERAL
PAINLEVEL_OUTOF10: 10
PAINLEVEL_OUTOF10: 6
PAINLEVEL_OUTOF10: 6

## 2019-08-05 ASSESSMENT — ENCOUNTER SYMPTOMS
EYE REDNESS: 0
ABDOMINAL PAIN: 0
COLOR CHANGE: 0
CONSTIPATION: 0
DIARRHEA: 0
BLOOD IN STOOL: 0
CHEST TIGHTNESS: 0
SHORTNESS OF BREATH: 0
COUGH: 0
NAUSEA: 1
EYE PAIN: 0
TROUBLE SWALLOWING: 0
ABDOMINAL DISTENTION: 0
VOMITING: 1

## 2019-08-05 ASSESSMENT — PAIN DESCRIPTION - PAIN TYPE
TYPE: ACUTE PAIN

## 2019-08-05 ASSESSMENT — PAIN DESCRIPTION - DESCRIPTORS
DESCRIPTORS: CRAMPING;SHARP
DESCRIPTORS: ACHING;PRESSURE;SORE
DESCRIPTORS: ACHING;CRAMPING;PRESSURE

## 2019-08-05 ASSESSMENT — PAIN DESCRIPTION - FREQUENCY
FREQUENCY: CONTINUOUS

## 2019-08-05 ASSESSMENT — PAIN DESCRIPTION - ONSET
ONSET: ON-GOING
ONSET: ON-GOING

## 2019-08-05 ASSESSMENT — PAIN DESCRIPTION - LOCATION
LOCATION: FLANK
LOCATION: BACK
LOCATION: BACK

## 2019-08-05 ASSESSMENT — PAIN - FUNCTIONAL ASSESSMENT
PAIN_FUNCTIONAL_ASSESSMENT: PREVENTS OR INTERFERES SOME ACTIVE ACTIVITIES AND ADLS

## 2019-08-05 ASSESSMENT — PAIN DESCRIPTION - DIRECTION: RADIATING_TOWARDS: HIPS

## 2019-08-05 NOTE — ED PROVIDER NOTES
Physical Exam   Constitutional: She is oriented to person, place, and time. She appears well-developed and well-nourished. Black female, ill-appearing   HENT:   Head: Normocephalic and atraumatic. Mouth/Throat: Oropharynx is clear and moist. No oropharyngeal exudate. Eyes: Pupils are equal, round, and reactive to light. Conjunctivae are normal. Right eye exhibits no discharge. Left eye exhibits no discharge. No scleral icterus. Neck: Normal range of motion. Neck supple. No thyromegaly present. Cardiovascular: Regular rhythm, normal heart sounds and intact distal pulses. Exam reveals no gallop and no friction rub. No murmur heard. Tachycardic   Pulmonary/Chest: Breath sounds normal. No respiratory distress. She has no wheezes. She has no rales. kussmal breathing   Abdominal: Soft. Normal appearance and bowel sounds are normal. She exhibits no distension, no abdominal bruit and no mass. There is no tenderness. There is no rebound and no guarding. No hernia. Musculoskeletal: Normal range of motion. She exhibits no edema. Lymphadenopathy:     She has no cervical adenopathy. Neurological: She is alert and oriented to person, place, and time. Skin: Skin is warm and dry. Capillary refill takes less than 2 seconds. No rash noted. She is not diaphoretic. No erythema. No pallor. Nursing note and vitals reviewed. Procedures  Central Line Placement Procedure Note    Indication: poor peripheral access and need for frequent blood draws    Consent: The patient was counseled regarding the procedure, its indications, risks, potential complications and alternatives, and any questions were answered. Consent was obtained to proceed. Procedure: The patient was positioned appropriately and the skin over the left internal jugular vein was prepped with Chloraprep and draped in a sterile fashion. Local anesthesia was obtained by infiltration using 1% Lidocaine without epinephrine.   A sterile ultrasound ---------------------------------------------  Past Medical History:  has a past medical history of Bleeding at insertion site, Common femoral artery injury, right, initial encounter, and DM type 1 (diabetes mellitus, type 1) ([facility] [address] Past Surgical History:  has a past surgical history that includes Abdomen surgery (N/A, 5/9/2019). Social History:  reports that she has never smoked. She has never used smokeless tobacco. She reports that she does not drink alcohol or use drugs. Family History: family history includes Diabetes in her maternal grandmother and paternal grandmother; Stroke in an other family member. The patients home medications have been reviewed. Allergies: Patient has no known allergies.     -------------------------------------------------- RESULTS -------------------------------------------------    Lab  Results for orders placed or performed during the hospital encounter of 08/05/19   Lactate, Sepsis   Result Value Ref Range    Lactic Acid, Sepsis 2.1 (H) 0.5 - 1.9 mmol/L   pH, venous   Result Value Ref Range    pH, Christoph 6.94 (LL) 7.35 - 7.45   Beta-Hydroxybutyrate   Result Value Ref Range    Beta-Hydroxybutyrate >4.50 (H) 0.02 - 0.27 mmol/L   CBC Auto Differential   Result Value Ref Range    WBC 19.4 (H) 4.5 - 11.5 E9/L    RBC 5.39 3.50 - 5.50 E12/L    Hemoglobin 15.4 11.5 - 15.5 g/dL    Hematocrit 48.7 (H) 34.0 - 48.0 %    MCV 90.4 80.0 - 99.9 fL    MCH 28.6 26.0 - 35.0 pg    MCHC 31.6 (L) 32.0 - 34.5 %    RDW 13.2 11.5 - 15.0 fL    Platelets 796 759 - 615 E9/L    MPV 12.0 7.0 - 12.0 fL    Neutrophils % 68.8 43.0 - 80.0 %    Immature Granulocytes % 0.9 0.0 - 5.0 %    Lymphocytes % 25.0 20.0 - 42.0 %    Monocytes % 4.6 2.0 - 12.0 %    Eosinophils % 0.1 0.0 - 6.0 %    Basophils % 0.6 0.0 - 2.0 %    Neutrophils # 13.35 (H) 1.80 - 7.30 E9/L    Immature Granulocytes # 0.17 E9/L    Lymphocytes # 4.85 (H) 1.50 - 4.00 E9/L    Monocytes # 0.90 0.10 - 0.95 E9/L    Eosinophils # 0.01 (L) 0.05 - Phosphorus 3.2 2.5 - 4.5 mg/dL   Lactic Acid, Plasma   Result Value Ref Range    Lactic Acid 1.8 0.5 - 2.2 mmol/L   POCT Glucose   Result Value Ref Range    Glucose 431 mg/dL    QC OK? ok    POC Pregnancy Urine   Result Value Ref Range    HCG, Urine, POC Negative Negative    Lot Number [id number]     Positive QC Pass/Fail Pass     Negative QC Pass/Fail Pass    POCT Glucose   Result Value Ref Range    Meter Glucose 431 (H) 74 - 99 mg/dL   POCT glucose - every hour   Result Value Ref Range    Glucose 451 mg/dL    QC OK? ok    POCT Glucose   Result Value Ref Range    Meter Glucose 451 (H) 74 - 99 mg/dL   POCT Glucose   Result Value Ref Range    Meter Glucose 443 (H) 74 - 99 mg/dL   POCT Glucose   Result Value Ref Range    Meter Glucose 363 (H) 74 - 99 mg/dL   POCT Glucose   Result Value Ref Range    Meter Glucose 189 (H) 74 - 99 mg/dL       Radiology  XR CHEST PORTABLE   Final Result      Left IJ central venous catheter terminates in the right atrium. No acute radiographic abnormality.                        ------------------------- NURSING NOTES AND VITALS REVIEWED ---------------------------  Date / Time Roomed:  8/5/2019  6:03 PM  ED Bed Assignment:  0210/0210-A    The nursing notes within the ED encounter and vital signs as below have been reviewed.    Patient Vitals for the past 24 hrs:   BP Temp Temp src Pulse Resp SpO2 Height Weight   08/06/19 0000 (!) 150/95 98.3 °F (36.8 °C) Oral 122 24 100 % -- --   08/05/19 2300 (!) 149/82 98.1 °F (36.7 °C) Oral 127 28 100 % -- --   08/05/19 2257 -- -- -- -- -- -- -- 95 lb 0.3 oz (43.1 kg)   08/05/19 2204 134/74 98 °F (36.7 °C) Oral 117 21 100 % -- --   08/05/19 2004 135/74 97.8 °F (36.6 °C) Oral 119 26 99 % -- --   08/05/19 1833 136/78 -- -- 125 -- -- -- --   08/05/19 1814 137/81 97.3 °F (36.3 °C) Oral 126 (!) 38 100 % 5' 1\" (1.549 m) 105 lb (47.6 kg)       Oxygen Saturation Interpretation: Normal      ------------------------------------------ PROGRESS NOTES agree with all pertinent clinical information unless otherwise noted. Please note that the withdrawal or failure to initiate urgent interventions for this patient would likely result in a life threatening deterioration or permanent disability. Accordingly this patient received 60 minutes of critical care time, excluding separately billable procedures.       Electronically signed by Tyler Anthony DO on 9/2/19 at 3:25 PM             Tyler Anthony DO  09/02/19 [id number]

## 2019-08-06 LAB
ANION GAP SERPL CALCULATED.3IONS-SCNC: 12 MMOL/L (ref 7–16)
ANION GAP SERPL CALCULATED.3IONS-SCNC: 13 MMOL/L (ref 7–16)
ANION GAP SERPL CALCULATED.3IONS-SCNC: 15 MMOL/L (ref 7–16)
ANION GAP SERPL CALCULATED.3IONS-SCNC: 25 MMOL/L (ref 7–16)
BASOPHILS ABSOLUTE: 0.06 E9/L (ref 0–0.2)
BASOPHILS RELATIVE PERCENT: 0.3 % (ref 0–2)
BUN BLDV-MCNC: 10 MG/DL (ref 6–20)
BUN BLDV-MCNC: 12 MG/DL (ref 6–20)
BUN BLDV-MCNC: 6 MG/DL (ref 6–20)
BUN BLDV-MCNC: 8 MG/DL (ref 6–20)
CALCIUM SERPL-MCNC: 8.2 MG/DL (ref 8.6–10.2)
CALCIUM SERPL-MCNC: 8.5 MG/DL (ref 8.6–10.2)
CALCIUM SERPL-MCNC: 8.6 MG/DL (ref 8.6–10.2)
CALCIUM SERPL-MCNC: 8.8 MG/DL (ref 8.6–10.2)
CHLORIDE BLD-SCNC: 108 MMOL/L (ref 98–107)
CHLORIDE BLD-SCNC: 109 MMOL/L (ref 98–107)
CHLORIDE BLD-SCNC: 111 MMOL/L (ref 98–107)
CHLORIDE BLD-SCNC: 113 MMOL/L (ref 98–107)
CO2: 10 MMOL/L (ref 22–29)
CO2: 11 MMOL/L (ref 22–29)
CO2: 14 MMOL/L (ref 22–29)
CO2: 5 MMOL/L (ref 22–29)
CREAT SERPL-MCNC: 0.5 MG/DL (ref 0.5–1)
CREAT SERPL-MCNC: 0.5 MG/DL (ref 0.5–1)
CREAT SERPL-MCNC: 0.6 MG/DL (ref 0.5–1)
CREAT SERPL-MCNC: 0.6 MG/DL (ref 0.5–1)
EOSINOPHILS ABSOLUTE: 0.01 E9/L (ref 0.05–0.5)
EOSINOPHILS RELATIVE PERCENT: 0.1 % (ref 0–6)
GFR AFRICAN AMERICAN: >60
GFR NON-AFRICAN AMERICAN: >60 ML/MIN/1.73
GLUCOSE BLD-MCNC: 156 MG/DL (ref 74–99)
GLUCOSE BLD-MCNC: 188 MG/DL (ref 74–99)
GLUCOSE BLD-MCNC: 197 MG/DL (ref 74–99)
GLUCOSE BLD-MCNC: 309 MG/DL (ref 74–99)
HBA1C MFR BLD: 14.2 % (ref 4–5.6)
HCT VFR BLD CALC: 38.6 % (ref 34–48)
HEMOGLOBIN: 12.8 G/DL (ref 11.5–15.5)
IMMATURE GRANULOCYTES #: 0.16 E9/L
IMMATURE GRANULOCYTES %: 0.8 % (ref 0–5)
LACTIC ACID: 1.8 MMOL/L (ref 0.5–2.2)
LYMPHOCYTES ABSOLUTE: 5.44 E9/L (ref 1.5–4)
LYMPHOCYTES RELATIVE PERCENT: 27.6 % (ref 20–42)
MAGNESIUM: 1.6 MG/DL (ref 1.6–2.6)
MAGNESIUM: 1.7 MG/DL (ref 1.6–2.6)
MAGNESIUM: 1.7 MG/DL (ref 1.6–2.6)
MAGNESIUM: 2.7 MG/DL (ref 1.6–2.6)
MCH RBC QN AUTO: 28.6 PG (ref 26–35)
MCHC RBC AUTO-ENTMCNC: 33.2 % (ref 32–34.5)
MCV RBC AUTO: 86.4 FL (ref 80–99.9)
METER GLUCOSE: 135 MG/DL (ref 74–99)
METER GLUCOSE: 137 MG/DL (ref 74–99)
METER GLUCOSE: 139 MG/DL (ref 74–99)
METER GLUCOSE: 142 MG/DL (ref 74–99)
METER GLUCOSE: 149 MG/DL (ref 74–99)
METER GLUCOSE: 153 MG/DL (ref 74–99)
METER GLUCOSE: 172 MG/DL (ref 74–99)
METER GLUCOSE: 172 MG/DL (ref 74–99)
METER GLUCOSE: 176 MG/DL (ref 74–99)
METER GLUCOSE: 178 MG/DL (ref 74–99)
METER GLUCOSE: 181 MG/DL (ref 74–99)
METER GLUCOSE: 189 MG/DL (ref 74–99)
METER GLUCOSE: 194 MG/DL (ref 74–99)
METER GLUCOSE: 363 MG/DL (ref 74–99)
METER GLUCOSE: 79 MG/DL (ref 74–99)
MONOCYTES ABSOLUTE: 1.35 E9/L (ref 0.1–0.95)
MONOCYTES RELATIVE PERCENT: 6.8 % (ref 2–12)
NEUTROPHILS ABSOLUTE: 12.72 E9/L (ref 1.8–7.3)
NEUTROPHILS RELATIVE PERCENT: 64.4 % (ref 43–80)
PDW BLD-RTO: 13.1 FL (ref 11.5–15)
PHOSPHORUS: 1.8 MG/DL (ref 2.5–4.5)
PHOSPHORUS: 2.1 MG/DL (ref 2.5–4.5)
PHOSPHORUS: 2.8 MG/DL (ref 2.5–4.5)
PHOSPHORUS: 3.2 MG/DL (ref 2.5–4.5)
PLATELET # BLD: 353 E9/L (ref 130–450)
PMV BLD AUTO: 10.2 FL (ref 7–12)
POTASSIUM SERPL-SCNC: 4.2 MMOL/L (ref 3.5–5)
POTASSIUM SERPL-SCNC: 4.5 MMOL/L (ref 3.5–5)
POTASSIUM SERPL-SCNC: 4.6 MMOL/L (ref 3.5–5)
POTASSIUM SERPL-SCNC: 4.7 MMOL/L (ref 3.5–5)
RBC # BLD: 4.47 E12/L (ref 3.5–5.5)
SODIUM BLD-SCNC: 133 MMOL/L (ref 132–146)
SODIUM BLD-SCNC: 134 MMOL/L (ref 132–146)
SODIUM BLD-SCNC: 138 MMOL/L (ref 132–146)
SODIUM BLD-SCNC: 141 MMOL/L (ref 132–146)
WBC # BLD: 19.7 E9/L (ref 4.5–11.5)

## 2019-08-06 PROCEDURE — 85025 COMPLETE CBC W/AUTO DIFF WBC: CPT

## 2019-08-06 PROCEDURE — 87081 CULTURE SCREEN ONLY: CPT

## 2019-08-06 PROCEDURE — 83735 ASSAY OF MAGNESIUM: CPT

## 2019-08-06 PROCEDURE — 84100 ASSAY OF PHOSPHORUS: CPT

## 2019-08-06 PROCEDURE — 6360000002 HC RX W HCPCS: Performed by: INTERNAL MEDICINE

## 2019-08-06 PROCEDURE — 2500000003 HC RX 250 WO HCPCS: Performed by: EMERGENCY MEDICINE

## 2019-08-06 PROCEDURE — 1200000000 HC SEMI PRIVATE

## 2019-08-06 PROCEDURE — 6370000000 HC RX 637 (ALT 250 FOR IP): Performed by: INTERNAL MEDICINE

## 2019-08-06 PROCEDURE — 6370000000 HC RX 637 (ALT 250 FOR IP): Performed by: EMERGENCY MEDICINE

## 2019-08-06 PROCEDURE — 99253 IP/OBS CNSLTJ NEW/EST LOW 45: CPT | Performed by: INTERNAL MEDICINE

## 2019-08-06 PROCEDURE — 2580000003 HC RX 258: Performed by: EMERGENCY MEDICINE

## 2019-08-06 PROCEDURE — 83036 HEMOGLOBIN GLYCOSYLATED A1C: CPT

## 2019-08-06 PROCEDURE — 82962 GLUCOSE BLOOD TEST: CPT

## 2019-08-06 PROCEDURE — 2580000003 HC RX 258: Performed by: INTERNAL MEDICINE

## 2019-08-06 PROCEDURE — 99233 SBSQ HOSP IP/OBS HIGH 50: CPT | Performed by: INTERNAL MEDICINE

## 2019-08-06 PROCEDURE — 80048 BASIC METABOLIC PNL TOTAL CA: CPT

## 2019-08-06 PROCEDURE — 6360000002 HC RX W HCPCS: Performed by: EMERGENCY MEDICINE

## 2019-08-06 PROCEDURE — 36592 COLLECT BLOOD FROM PICC: CPT

## 2019-08-06 PROCEDURE — 36415 COLL VENOUS BLD VENIPUNCTURE: CPT

## 2019-08-06 RX ORDER — INSULIN GLARGINE 100 [IU]/ML
25 INJECTION, SOLUTION SUBCUTANEOUS NIGHTLY
Status: DISCONTINUED | OUTPATIENT
Start: 2019-08-06 | End: 2019-08-06

## 2019-08-06 RX ORDER — HEPARIN SODIUM (PORCINE) LOCK FLUSH IV SOLN 100 UNIT/ML 100 UNIT/ML
3 SOLUTION INTRAVENOUS PRN
Status: DISCONTINUED | OUTPATIENT
Start: 2019-08-06 | End: 2019-08-07 | Stop reason: HOSPADM

## 2019-08-06 RX ORDER — INSULIN GLARGINE 100 [IU]/ML
10 INJECTION, SOLUTION SUBCUTANEOUS ONCE
Status: COMPLETED | OUTPATIENT
Start: 2019-08-06 | End: 2019-08-06

## 2019-08-06 RX ORDER — HEPARIN SODIUM (PORCINE) LOCK FLUSH IV SOLN 100 UNIT/ML 100 UNIT/ML
3 SOLUTION INTRAVENOUS EVERY 12 HOURS
Status: DISCONTINUED | OUTPATIENT
Start: 2019-08-06 | End: 2019-08-07 | Stop reason: HOSPADM

## 2019-08-06 RX ORDER — INSULIN GLARGINE 100 [IU]/ML
12 INJECTION, SOLUTION SUBCUTANEOUS ONCE
Status: COMPLETED | OUTPATIENT
Start: 2019-08-06 | End: 2019-08-06

## 2019-08-06 RX ORDER — INSULIN GLARGINE 100 [IU]/ML
25 INJECTION, SOLUTION SUBCUTANEOUS NIGHTLY
Status: DISCONTINUED | OUTPATIENT
Start: 2019-08-07 | End: 2019-08-07

## 2019-08-06 RX ADMIN — Medication 10 ML: at 09:00

## 2019-08-06 RX ADMIN — INSULIN LISPRO 4 UNITS: 100 INJECTION, SOLUTION INTRAVENOUS; SUBCUTANEOUS at 16:48

## 2019-08-06 RX ADMIN — MORPHINE SULFATE 2 MG: 2 INJECTION, SOLUTION INTRAMUSCULAR; INTRAVENOUS at 17:16

## 2019-08-06 RX ADMIN — MAGNESIUM SULFATE HEPTAHYDRATE 1 G: 1 INJECTION, SOLUTION INTRAVENOUS at 09:31

## 2019-08-06 RX ADMIN — MORPHINE SULFATE 2 MG: 2 INJECTION, SOLUTION INTRAMUSCULAR; INTRAVENOUS at 21:07

## 2019-08-06 RX ADMIN — DEXTROSE AND SODIUM CHLORIDE: 5; 450 INJECTION, SOLUTION INTRAVENOUS at 07:00

## 2019-08-06 RX ADMIN — POTASSIUM CHLORIDE 10 MEQ: 10 INJECTION, SOLUTION INTRAVENOUS at 10:03

## 2019-08-06 RX ADMIN — POTASSIUM CHLORIDE 10 MEQ: 10 INJECTION, SOLUTION INTRAVENOUS at 05:43

## 2019-08-06 RX ADMIN — POTASSIUM CHLORIDE 10 MEQ: 10 INJECTION, SOLUTION INTRAVENOUS at 09:31

## 2019-08-06 RX ADMIN — POTASSIUM CHLORIDE 10 MEQ: 10 INJECTION, SOLUTION INTRAVENOUS at 01:32

## 2019-08-06 RX ADMIN — MAGNESIUM SULFATE HEPTAHYDRATE 1 G: 1 INJECTION, SOLUTION INTRAVENOUS at 10:29

## 2019-08-06 RX ADMIN — INSULIN GLARGINE 12 UNITS: 100 INJECTION, SOLUTION SUBCUTANEOUS at 18:41

## 2019-08-06 RX ADMIN — DEXTROSE AND SODIUM CHLORIDE: 5; 450 INJECTION, SOLUTION INTRAVENOUS at 01:03

## 2019-08-06 RX ADMIN — KETOROLAC TROMETHAMINE 15 MG: 30 INJECTION, SOLUTION INTRAMUSCULAR at 05:41

## 2019-08-06 RX ADMIN — SODIUM PHOSPHATE, MONOBASIC, MONOHYDRATE 15 MMOL: 276; 142 INJECTION, SOLUTION INTRAVENOUS at 05:10

## 2019-08-06 RX ADMIN — POTASSIUM CHLORIDE 10 MEQ: 10 INJECTION, SOLUTION INTRAVENOUS at 02:07

## 2019-08-06 RX ADMIN — POTASSIUM CHLORIDE 10 MEQ: 10 INJECTION, SOLUTION INTRAVENOUS at 00:59

## 2019-08-06 RX ADMIN — INSULIN GLARGINE 10 UNITS: 100 INJECTION, SOLUTION SUBCUTANEOUS at 13:34

## 2019-08-06 RX ADMIN — POTASSIUM CHLORIDE 10 MEQ: 10 INJECTION, SOLUTION INTRAVENOUS at 05:10

## 2019-08-06 ASSESSMENT — PAIN SCALES - GENERAL
PAINLEVEL_OUTOF10: 0
PAINLEVEL_OUTOF10: 8
PAINLEVEL_OUTOF10: 1
PAINLEVEL_OUTOF10: 0
PAINLEVEL_OUTOF10: 2
PAINLEVEL_OUTOF10: 8
PAINLEVEL_OUTOF10: 9

## 2019-08-06 ASSESSMENT — PAIN DESCRIPTION - DESCRIPTORS
DESCRIPTORS: DISCOMFORT;SORE;ACHING
DESCRIPTORS: ACHING;PRESSURE;SORE

## 2019-08-06 ASSESSMENT — PAIN DESCRIPTION - ORIENTATION
ORIENTATION: LOWER
ORIENTATION: RIGHT;LEFT

## 2019-08-06 ASSESSMENT — PAIN DESCRIPTION - PROGRESSION
CLINICAL_PROGRESSION: NOT CHANGED
CLINICAL_PROGRESSION: NOT CHANGED

## 2019-08-06 ASSESSMENT — PAIN - FUNCTIONAL ASSESSMENT
PAIN_FUNCTIONAL_ASSESSMENT: ACTIVITIES ARE NOT PREVENTED
PAIN_FUNCTIONAL_ASSESSMENT: PREVENTS OR INTERFERES SOME ACTIVE ACTIVITIES AND ADLS

## 2019-08-06 ASSESSMENT — PAIN DESCRIPTION - PAIN TYPE
TYPE: ACUTE PAIN
TYPE: ACUTE PAIN

## 2019-08-06 ASSESSMENT — PAIN DESCRIPTION - FREQUENCY
FREQUENCY: INTERMITTENT
FREQUENCY: CONTINUOUS

## 2019-08-06 ASSESSMENT — PAIN DESCRIPTION - ONSET
ONSET: AWAKENED FROM SLEEP
ONSET: ON-GOING

## 2019-08-06 ASSESSMENT — PAIN DESCRIPTION - LOCATION
LOCATION: BACK;FLANK
LOCATION: BACK

## 2019-08-06 NOTE — PROGRESS NOTES
[facility] Progress Note    Admitting Date and Time: 8/5/2019  6:03 PM  Admit Dx: DKA, type 1, not at goal ([facility] [address] [E10.10]  DKA, type 1, not at goal [facility]) [E10.10]    Subjective:  Patient is being followed for DKA, type 1, not at goal ([facility] [address] [E10.10]  DKA, type 1, not at goal [facility]) [E10.10]   Pt feels better, reported not taking her lantus saying \"I was lazy\"    ROS: denies fever, chills, cp, sob, n/v, HA unless stated above.       sodium chloride flush  10 mL Intravenous BID       dextrose 12.5 g PRN   potassium chloride 10 mEq PRN   magnesium sulfate 1 g PRN   sodium phosphate IVPB 10 mmol PRN   Or     sodium phosphate IVPB 15 mmol PRN   Or     sodium phosphate IVPB 20 mmol PRN   dextrose 5 % and 0.45 % NaCl  Continuous PRN   ketorolac 15 mg Q6H PRN   promethazine 6.25 mg Q6H PRN   morphine 2 mg Q3H PRN        Objective:    /80   Pulse 108   Temp 98 °F (36.7 °C) (Oral)   Resp 15   Ht 5' 1\" (1.549 m)   Wt 95 lb 0.3 oz (43.1 kg)   LMP 07/29/2019   SpO2 100%   BMI 17.95 kg/m²     General Appearance: alert and oriented to person, place and time  Skin: warm and dry  Head: normocephalic and atraumatic  Eyes: pupils equal, round, and reactive to light, extraocular eye movements intact, conjunctivae normal  Neck: neck supple and non tender without mass   Pulmonary/Chest: clear to auscultation bilaterally- no wheezes, rales or rhonchi, normal air movement, no respiratory distress  Cardiovascular: normal rate, normal S1 and S2 and no carotid bruits  Abdomen: soft, non-tender, non-distended, normal bowel sounds, no masses or organomegaly  Extremities: no cyanosis, no clubbing and no edema  Neurologic: no cranial nerve deficit and speech normal        Recent Labs     08/05/19  1820  08/05/19  2149 08/05/19  2335 08/06/19  0405     --   --  141 138   K 5.3*  --   --  4.2 4.7   CL 97*  --   --  111* 113*   CO2 4*  --   --  5* 10*   BUN 14  --   --  12 10   CREATININE 0.7  --   --  0.6 0.6

## 2019-08-06 NOTE — PATIENT CARE CONFERENCE
Intensive Care Daily Quality Rounding Checklist      ICU Team Members:  Dr. Otilia Mcgarry, ICU residents, nursing leadership, bedside nurse and case management    ICU Day #: 2    Intubation Date: N/A    Ventilator Day #: N/A    Central Line Insertion Date: August 5        Day #: NUMBER: 2     Arterial Line Insertion Date: N/A      Day #: N/A    DVT Prophylaxis: ambulation    GI Prophylaxis: none    Bey Catheter Insertion Date: N/A       Day #: N/A      Continued need (if yes, reason documented and discussed with physician): N/A    Skin Issues/ Wounds and ordered treatment discussed on rounds: none    Goals/ Plans for the Day: transition to insulin SQ, serila labs with electrolyte replacement, consult endocrinology, check A1C

## 2019-08-06 NOTE — H&P
the skin 3 times daily (before meals) Take 4 units plus  **Corrective Low Dose Algorithm**  Glucose: Dose:               No Insulin  140-199 1 Unit  200-249 2 Units  250-299 3 Units  300-349 4 Units  350-399 5 Units  Over 399 6 Units 4/15/19  Yes Historical Provider, MD   insulin glargine (BASAGLAR KWIKPEN) 100 UNIT/ML injection pen Inject 25 Units into the skin nightly  Patient taking differently: Inject 28 Units into the skin nightly  5/9/18  Yes Roseline Jacob MD   ibuprofen (ADVIL;MOTRIN) 200 MG tablet Take 800 mg by mouth every 6 hours as needed for Pain    Historical Provider, MD   acetone, urine, test strip Use daily as directed if bs >250 x2 or illness 8/31/18   Historical Provider, MD   FREESTYLE LITE strip USE AS DIRECTED UP TO 6 TIMES DAILY 8/31/18   Historical Provider, MD   BD PEN NEEDLE SHELBIE U/F 32G X 4 MM MISC USE AS DIRECTED UP TO 6 TIMES A DAY 8/31/18   Historical Provider, MD       Allergies:  Patient has no known allergies. Social History:   TOBACCO:   reports that she has never smoked. She has never used smokeless tobacco.  ETOH:   reports that she does not drink alcohol. Family History:       Problem Relation Age of Onset    Diabetes Maternal Grandmother     Diabetes Paternal Grandmother     Stroke Other     Thyroid Disease Neg Hx       Or deferred/otherwise considered non contributory to current admission  PHYSICAL EXAM:    VS: /74   Pulse 117   Temp 98 °F (36.7 °C) (Oral)   Resp 21   Ht 5' 1\" (1.549 m)   Wt 105 lb (47.6 kg)   LMP 07/29/2019   SpO2 100%   BMI 19.84 kg/m²     General Appearance:     no acute distress. Psych:  HEENT:    A.O.  As per HPI details  NC/AT, PERRL, no pallor no icterus, lips/ext mucous membrane grossly dry    Neck:   Supple, trachea midline, no obvious JVD  Left IJ tlc   Resp:     CTAB, No wheezes, No rhonchi  Hpyerventilating, deep breathes   Chest wall:    No tenderness or deformity   Heart:    Regular rate and rhythm, S1 and S2

## 2019-08-06 NOTE — CONSULTS
ENDOCRINOLOGY CONSULTATION NOTE      Date of Admission: 8/5/2019  Date of service: 8/6/2019  Admitting Physician: Bethany Dunn MD   Primary Care Physician: Taz Trivedi DO  Consultant physician: Eric Law MD     Reason for the consultation:  Poorly controlled DM type 1     History of Present Illness: The history is provided by the patient. Accuracy of the patient data is excellent    Simona Moncada is a very pleasant 26 y.o. old female with PMH of poorly controlled type 1 DM admitted to [facility] on 8/5/2019 with DKA, endocrine team was consulted for diabetes management. The patient was in [facility] eating junk food, non-compliant with her insulin taking only her lantus but not SSI, and noted her BGL running \"high. \" Had nausea without vomiting in the emergency department as well as flank pain    Prior to admission  Type 1 DM was diagnosed at the age 8. Prior to admission, patient was on lantus 25 units daily at bedtime, Humalog 1u:10g of carbs + ss 1:50>150. Patient has not been eating consistent carbohydrate meals, she rarely checks BS and self-blood glucose monitoring has been above goal prior to admission. The patient has been poorly compliant with insulin therapy and BS checking and over the past year she was admitted with DKA multiple times. She is up to date with eye exam. Last eye exam was a year ago and denied any history of diabetic retinopathy. The patient performs her own feet care and doesn't see podiatry service   Microvascular complications:  No Retinopathy, Nephropathy or Neuropathy   Macrovascular complications: no CAD, PVD, or Stroke    After admission   While hospitalized, switched from insulin drip to SQ at 11 am this morning. Appetite is good.      Inpatient diet:   Carb Restricted diet     Point of care glucose monitoring:   Independently reviewed   Recent Labs     08/06/19  0551 08/06/19  0702 08/06/19  0757 08/06/19  0915 08/06/19  1005 08/06/19  1202 08/06/19  1348 08/06/19  1647   GLUMET 153* 149* 181* 178* 172* 176* 135* 172*       Past Medical History   Past Medical History:   Diagnosis Date    Bleeding at insertion site 1/5/2018    Common femoral artery injury, right, initial encounter 1/5/2018    DM type 1 (diabetes mellitus, type 1) (HCC)      Past Surgical History   Past Surgical History:   Procedure Laterality Date    ABDOMEN SURGERY N/A 5/9/2019    INCISION AND DRAINAGE OF SUPRAPUBIC ABSESS performed by Nasra Edmond MD at [facility] OR     Social history   Social History     Socioeconomic History    Marital status: Single     Spouse name: Not on file    Number of children: Not on file    Years of education: Not on file    Highest education level: Not on file   Occupational History    Not on file   Social Needs    Financial resource strain: Not on file    Food insecurity:     Worry: Not on file     Inability: Not on file    Transportation needs:     Medical: Not on file     Non-medical: Not on file   Tobacco Use    Smoking status: Never Smoker    Smokeless tobacco: Never Used   Substance and Sexual Activity    Alcohol use: No    Drug use: No    Sexual activity: Yes     Partners: Male   Lifestyle    Physical activity:     Days per week: Not on file     Minutes per session: Not on file    Stress: Not on file   Relationships    Social connections:     Talks on phone: Not on file     Gets together: Not on file     Attends Christianity service: Not on file     Active member of club or organization: Not on file     Attends meetings of clubs or organizations: Not on file     Relationship status: Not on file    Intimate partner violence:     Fear of current or ex partner: Not on file     Emotionally abused: Not on file     Physically abused: Not on file     Forced sexual activity: Not on file   Other Topics Concern    Not on file   Social History Narrative    Not on file     Family history   Family History   Problem Relation Age of Onset    Diabetes Maternal Grandmother     Diabetes Paternal Grandmother     Stroke Other     Thyroid Disease Neg Hx      Allergies and Drug reactions  No Known Allergies    Scheduled Meds:   insulin glargine  25 Units Subcutaneous Nightly    insulin lispro  4-10 Units Subcutaneous TID WC    heparin flush  3 mL Intravenous Q12H    sodium chloride flush  10 mL Intravenous BID     PRN Meds:     heparin flush 3 mL PRN   dextrose 12.5 g PRN   ketorolac 15 mg Q6H PRN   promethazine 6.25 mg Q6H PRN   morphine 2 mg Q3H PRN     Continuous Infusions:      Review of Systems  Constitutional: tired   HEENT: No blurred vision, No sore throat, no ear pain, no hair loss  Neck: denied any neck swelling, difficulty swallowing,   Cardio-pulmonary: No CP, SOB or palpitation, No orthopnea or PND. No cough or wheezing. GI: No N/V/D, no constipation, mild epigastric pain   : Denied any dysuria, hematuria, flank pain, discharge, or incontinence. Skin: denied any rash, ulcer, or hyperpigmentation. MSK: denied any joint deformity, joint pain/swelling, muscle pain, or back pain. Neuro: no numbness, no tingling, no weakness, _    OBJECTIVE    BP (!) 112/91   Pulse 111   Temp 98.5 °F (36.9 °C) (Oral)   Resp 17   Ht 5' 1\" (1.549 m)   Wt 95 lb 0.3 oz (43.1 kg)   LMP 07/29/2019   SpO2 100%   BMI 17.95 kg/m²     Intake/Output Summary (Last 24 hours) at 8/6/2019 1657  Last data filed at 8/6/2019 1003  Gross per 24 hour   Intake 2449.9 ml   Output --   Net 2449.9 ml       Physical examination:  General: awake alert, oriented x3, no abnormal position or movements. HEENT: normocephalic non-traumatic, no exophthalmos   Neck: supple, no LN enlargement, no thyromegaly, no thyroid tenderness, no JVD. Pulm: Clear equal air entry no added sounds, no wheezing or rhonchi    CVS: S1 + S2, no murmur, no heave. Dorsalis pedis pulse palpable   Abd: soft lax, + epigastric tenderness, no organomegaly, audible bowel sounds. Skin: warm, no lesions, no rash. No callus, no Ulcers, No acanthosis nigricans   Neuro: CN intact, Monofilament sensation normal bilateral , muscle power normal  Psych: normal mood, and affect    Review of Laboratory Data:  I personally reviewed the following labs:   Recent Labs     08/05/19 1820 08/06/19  0405   WBC 19.4* 19.7*   RBC 5.39 4.47   HGB 15.4 12.8   HCT 48.7* 38.6   MCV 90.4 86.4   MCH 28.6 28.6   MCHC 31.6* 33.2   RDW 13.2 13.1    353   MPV 12.0 10.2     Recent Labs     08/05/19  1820  08/06/19  0405 08/06/19  0800 08/06/19  1200      < > 138 133 134   K 5.3*   < > 4.7 4.5 4.6   CL 97*   < > 113* 109* 108*   CO2 4*   < > 10* 11* 14*   BUN 14   < > 10 8 6   CREATININE 0.7   < > 0.6 0.5 0.5   GLUCOSE 605*   < > 156* 188* 197*   CALCIUM 9.8   < > 8.5* 8.2* 8.8   PROT 9.0*  --   --   --   --    LABALBU 4.6  --   --   --   --    BILITOT <0.2  --   --   --   --    ALKPHOS 121*  --   --   --   --    AST 21  --   --   --   --    ALT 23  --   --   --   --     < > = values in this interval not displayed.      Beta-Hydroxybutyrate   Date Value Ref Range Status   08/05/2019 >4.50 (H) 0.02 - 0.27 mmol/L Final   05/07/2019 >4.50 (H) 0.02 - 0.27 mmol/L Final   03/24/2019 1.04 (H) 0.02 - 0.27 mmol/L Final     Lab Results   Component Value Date    LABA1C 14.2 08/06/2019    LABA1C 14.0 04/11/2019    LABA1C  02/14/2019      Comment:      code #106 too high to read  over 15     No results found for: TSH, T4FREE, R1DLNBI, FT3, C3NUVOU, TSI, TPOABS, THGAB  Lab Results   Component Value Date    LABA1C 14.2 08/06/2019    GLUCOSE 197 08/06/2019    MALBCR 204.6 01/23/2018    LABMICR 102.3 01/23/2018    LABCREA 50 01/23/2018     Lab Results   Component Value Date    CHOL 175 01/24/2018    TRIG 106 01/24/2018    HDL 40 01/24/2018       Blood culture   Lab Results   Component Value Date    BC 5 Days- no growth 05/07/2019    BC 5 Days- no growth 02/10/2019       Radiology:  XR CHEST PORTABLE   Final Result      Left IJ central venous catheter [phone]  --------------------------------------------  Electronically signed by Cheryl Gant MD  on 8/6/19 at 4:57 PM

## 2019-08-07 VITALS
TEMPERATURE: 98.9 F | BODY MASS INDEX: 17.94 KG/M2 | HEART RATE: 100 BPM | SYSTOLIC BLOOD PRESSURE: 119 MMHG | HEIGHT: 61 IN | DIASTOLIC BLOOD PRESSURE: 86 MMHG | RESPIRATION RATE: 14 BRPM | OXYGEN SATURATION: 100 % | WEIGHT: 95.02 LBS

## 2019-08-07 LAB
ANION GAP SERPL CALCULATED.3IONS-SCNC: 13 MMOL/L (ref 7–16)
BASOPHILS ABSOLUTE: 0.02 E9/L (ref 0–0.2)
BASOPHILS RELATIVE PERCENT: 0.2 % (ref 0–2)
BUN BLDV-MCNC: 8 MG/DL (ref 6–20)
CALCIUM SERPL-MCNC: 8.7 MG/DL (ref 8.6–10.2)
CHLORIDE BLD-SCNC: 111 MMOL/L (ref 98–107)
CO2: 17 MMOL/L (ref 22–29)
CREAT SERPL-MCNC: 0.6 MG/DL (ref 0.5–1)
EOSINOPHILS ABSOLUTE: 0.08 E9/L (ref 0.05–0.5)
EOSINOPHILS RELATIVE PERCENT: 0.9 % (ref 0–6)
GFR AFRICAN AMERICAN: >60
GFR NON-AFRICAN AMERICAN: >60 ML/MIN/1.73
GLUCOSE BLD-MCNC: 158 MG/DL (ref 74–99)
HCT VFR BLD CALC: 35.4 % (ref 34–48)
HEMOGLOBIN: 12 G/DL (ref 11.5–15.5)
IMMATURE GRANULOCYTES #: 0.01 E9/L
IMMATURE GRANULOCYTES %: 0.1 % (ref 0–5)
LYMPHOCYTES ABSOLUTE: 2.86 E9/L (ref 1.5–4)
LYMPHOCYTES RELATIVE PERCENT: 32.6 % (ref 20–42)
MCH RBC QN AUTO: 28.6 PG (ref 26–35)
MCHC RBC AUTO-ENTMCNC: 33.9 % (ref 32–34.5)
MCV RBC AUTO: 84.3 FL (ref 80–99.9)
METER GLUCOSE: 109 MG/DL (ref 74–99)
METER GLUCOSE: 122 MG/DL (ref 74–99)
METER GLUCOSE: 52 MG/DL (ref 74–99)
METER GLUCOSE: 90 MG/DL (ref 74–99)
MONOCYTES ABSOLUTE: 0.51 E9/L (ref 0.1–0.95)
MONOCYTES RELATIVE PERCENT: 5.8 % (ref 2–12)
MRSA CULTURE ONLY: NORMAL
NEUTROPHILS ABSOLUTE: 5.28 E9/L (ref 1.8–7.3)
NEUTROPHILS RELATIVE PERCENT: 60.4 % (ref 43–80)
PDW BLD-RTO: 13 FL (ref 11.5–15)
PLATELET # BLD: 251 E9/L (ref 130–450)
PMV BLD AUTO: 10.1 FL (ref 7–12)
POTASSIUM SERPL-SCNC: 3.9 MMOL/L (ref 3.5–5)
RBC # BLD: 4.2 E12/L (ref 3.5–5.5)
SODIUM BLD-SCNC: 141 MMOL/L (ref 132–146)
WBC # BLD: 8.8 E9/L (ref 4.5–11.5)

## 2019-08-07 PROCEDURE — 6370000000 HC RX 637 (ALT 250 FOR IP): Performed by: INTERNAL MEDICINE

## 2019-08-07 PROCEDURE — 99239 HOSP IP/OBS DSCHRG MGMT >30: CPT | Performed by: INTERNAL MEDICINE

## 2019-08-07 PROCEDURE — 36592 COLLECT BLOOD FROM PICC: CPT

## 2019-08-07 PROCEDURE — 85025 COMPLETE CBC W/AUTO DIFF WBC: CPT

## 2019-08-07 PROCEDURE — 6360000002 HC RX W HCPCS: Performed by: INTERNAL MEDICINE

## 2019-08-07 PROCEDURE — 80048 BASIC METABOLIC PNL TOTAL CA: CPT

## 2019-08-07 PROCEDURE — 2580000003 HC RX 258: Performed by: INTERNAL MEDICINE

## 2019-08-07 PROCEDURE — 36415 COLL VENOUS BLD VENIPUNCTURE: CPT

## 2019-08-07 PROCEDURE — 82962 GLUCOSE BLOOD TEST: CPT

## 2019-08-07 PROCEDURE — 99232 SBSQ HOSP IP/OBS MODERATE 35: CPT | Performed by: INTERNAL MEDICINE

## 2019-08-07 RX ORDER — INSULIN GLARGINE 100 [IU]/ML
16 INJECTION, SOLUTION SUBCUTANEOUS NIGHTLY
Status: DISCONTINUED | OUTPATIENT
Start: 2019-08-07 | End: 2019-08-07 | Stop reason: HOSPADM

## 2019-08-07 RX ORDER — INSULIN GLARGINE 100 [IU]/ML
24 INJECTION, SOLUTION SUBCUTANEOUS NIGHTLY
Status: DISCONTINUED | OUTPATIENT
Start: 2019-08-07 | End: 2019-08-07

## 2019-08-07 RX ORDER — OXYCODONE HYDROCHLORIDE AND ACETAMINOPHEN 5; 325 MG/1; MG/1
1 TABLET ORAL EVERY 6 HOURS PRN
Status: DISCONTINUED | OUTPATIENT
Start: 2019-08-07 | End: 2019-08-07 | Stop reason: HOSPADM

## 2019-08-07 RX ADMIN — Medication 10 ML: at 09:47

## 2019-08-07 RX ADMIN — INSULIN LISPRO 5 UNITS: 100 INJECTION, SOLUTION INTRAVENOUS; SUBCUTANEOUS at 11:38

## 2019-08-07 RX ADMIN — INSULIN LISPRO 4 UNITS: 100 INJECTION, SOLUTION INTRAVENOUS; SUBCUTANEOUS at 07:39

## 2019-08-07 RX ADMIN — MORPHINE SULFATE 2 MG: 2 INJECTION, SOLUTION INTRAMUSCULAR; INTRAVENOUS at 00:49

## 2019-08-07 RX ADMIN — KETOROLAC TROMETHAMINE 15 MG: 30 INJECTION, SOLUTION INTRAMUSCULAR at 04:23

## 2019-08-07 RX ADMIN — OXYCODONE HYDROCHLORIDE AND ACETAMINOPHEN 1 TABLET: 5; 325 TABLET ORAL at 09:46

## 2019-08-07 RX ADMIN — Medication 300 UNITS: at 09:00

## 2019-08-07 RX ADMIN — INSULIN LISPRO 4 UNITS: 100 INJECTION, SOLUTION INTRAVENOUS; SUBCUTANEOUS at 16:44

## 2019-08-07 ASSESSMENT — PAIN DESCRIPTION - PAIN TYPE
TYPE: ACUTE PAIN
TYPE: CHRONIC PAIN

## 2019-08-07 ASSESSMENT — PAIN DESCRIPTION - PROGRESSION: CLINICAL_PROGRESSION: NOT CHANGED

## 2019-08-07 ASSESSMENT — PAIN SCALES - GENERAL
PAINLEVEL_OUTOF10: 2
PAINLEVEL_OUTOF10: 9
PAINLEVEL_OUTOF10: 9
PAINLEVEL_OUTOF10: 0
PAINLEVEL_OUTOF10: 8

## 2019-08-07 ASSESSMENT — PAIN DESCRIPTION - ONSET: ONSET: AWAKENED FROM SLEEP

## 2019-08-07 ASSESSMENT — PAIN DESCRIPTION - FREQUENCY: FREQUENCY: INTERMITTENT

## 2019-08-07 ASSESSMENT — PAIN DESCRIPTION - LOCATION
LOCATION: GENERALIZED
LOCATION: GENERALIZED

## 2019-08-07 ASSESSMENT — PAIN DESCRIPTION - DESCRIPTORS
DESCRIPTORS: PATIENT UNABLE TO DESCRIBE
DESCRIPTORS: PATIENT UNABLE TO DESCRIBE

## 2019-08-07 NOTE — DISCHARGE SUMMARY
10.1       No results for input(s): POCGLU in the last 72 hours. Imaging:   XR CHEST PORTABLE   Final Result      Left IJ central venous catheter terminates in the right atrium. No acute radiographic abnormality. Patient Instructions:      Medication List      CHANGE how you take these medications    insulin glargine 100 UNIT/ML injection pen  Commonly known as:  LANTUS  Inject 25 Units into the skin nightly  What changed:  how much to take        CONTINUE taking these medications    acetone (urine) test strip     BD PEN NEEDLE SHELBIE U/F 32G X 4 MM Misc  Generic drug:  Insulin Pen Needle     FREESTYLE LITE strip  Generic drug:  blood glucose test strips     ibuprofen 200 MG tablet  Commonly known as:  ADVIL;MOTRIN     NOVOLOG FLEXPEN 100 UNIT/ML injection pen  Generic drug:  insulin aspart              Note that more than 30 minutes was spent in preparing discharge papers, discussing discharge with patient, medication review, etc.    Signed:  Electronically signed by Skyler Loera MD on 8/7/2019 at 3:00 PM    NOTE: This report was transcribed using voice recognition software. Every effort was made to ensure accuracy; however, inadvertent computerized transcription errors may be present.

## 2019-08-07 NOTE — PROGRESS NOTES
ENDOCRINOLOGY PROGRESS NOTE    Date of Service: 8/7/2019  Date of Admission: 8/5/2019  Admitting Physician: Kobe Baldwin MD   Primary Care Physician: Wicho Sweet DO  Consultant physician: Leo Lim MD     Reason for the consultation:  Poorly controlled DM type 1     History of Present Illness: The history is provided by the patient. Accuracy of the patient data is excellent. Pelon Cuellar is a very pleasant 26 y.o. old female with PMH of poorly controlled type 1 DM admitted to [facility] on 8/5/2019 with DKA, endocrine team was consulted for diabetes management. Subjective:  Patient seen and examined, no overnight major events. Feels better this morning, appetite is good. Inpatient diet:   Carb Restricted diet     Point of care glucose monitoring:   Independently reviewed   Recent Labs     08/06/19  0757 08/06/19  0915 08/06/19  1005 08/06/19  1202 08/06/19  1348 08/06/19  1647 08/06/19  2104 08/07/19  0734   GLUMET 181* 178* 172* 176* 135* 172* 79 122*       Scheduled Meds:   heparin flush  3 mL Intravenous Q12H    insulin glargine  25 Units Subcutaneous Nightly    insulin lispro  4 Units Subcutaneous TID WC    insulin lispro  0-6 Units Subcutaneous TID WC    insulin lispro  0-3 Units Subcutaneous Nightly    sodium chloride flush  10 mL Intravenous BID     PRN Meds:     heparin flush 3 mL PRN   dextrose 12.5 g PRN   promethazine 6.25 mg Q6H PRN     Continuous Infusions:      Review of Systems  All systems reviewed.  All negative except for symptoms mentioned in HPI     OBJECTIVE    /82   Pulse 99   Temp 98.2 °F (36.8 °C) (Oral)   Resp 14   Ht 5' 1\" (1.549 m)   Wt 95 lb 0.3 oz (43.1 kg)   LMP 07/29/2019   SpO2 100%   BMI 17.95 kg/m²     Intake/Output Summary (Last 24 hours) at 8/7/2019 0813  Last data filed at 8/6/2019 1003  Gross per 24 hour   Intake 413 ml   Output --   Net 413 ml       Physical examination:  General: awake alert, oriented x3, no abnormal position or

## 2019-08-13 LAB
BLOOD CULTURE, ROUTINE: NORMAL
CULTURE, BLOOD 2: NORMAL

## 2019-08-15 ENCOUNTER — OFFICE VISIT (OUTPATIENT)
Dept: ENDOCRINOLOGY | Age: 24
End: 2019-08-15
Payer: COMMERCIAL

## 2019-08-15 VITALS
BODY MASS INDEX: 19.18 KG/M2 | DIASTOLIC BLOOD PRESSURE: 78 MMHG | SYSTOLIC BLOOD PRESSURE: 118 MMHG | RESPIRATION RATE: 16 BRPM | HEART RATE: 109 BPM | HEIGHT: 61 IN | OXYGEN SATURATION: 96 % | WEIGHT: 101.6 LBS

## 2019-08-15 DIAGNOSIS — E10.65 UNCONTROLLED TYPE 1 DIABETES MELLITUS WITH HYPERGLYCEMIA (HCC): Primary | Chronic | ICD-10-CM

## 2019-08-15 PROCEDURE — 1111F DSCHRG MED/CURRENT MED MERGE: CPT | Performed by: INTERNAL MEDICINE

## 2019-08-15 PROCEDURE — 1036F TOBACCO NON-USER: CPT | Performed by: INTERNAL MEDICINE

## 2019-08-15 PROCEDURE — G8420 CALC BMI NORM PARAMETERS: HCPCS | Performed by: INTERNAL MEDICINE

## 2019-08-15 PROCEDURE — 3046F HEMOGLOBIN A1C LEVEL >9.0%: CPT | Performed by: INTERNAL MEDICINE

## 2019-08-15 PROCEDURE — 2022F DILAT RTA XM EVC RTNOPTHY: CPT | Performed by: INTERNAL MEDICINE

## 2019-08-15 PROCEDURE — G8427 DOCREV CUR MEDS BY ELIG CLIN: HCPCS | Performed by: INTERNAL MEDICINE

## 2019-08-15 PROCEDURE — 99214 OFFICE O/P EST MOD 30 MIN: CPT | Performed by: INTERNAL MEDICINE

## 2019-08-15 RX ORDER — INSULIN ASPART 100 [IU]/ML
INJECTION, SOLUTION INTRAVENOUS; SUBCUTANEOUS
Qty: 15 PEN | Refills: 5 | Status: ON HOLD
Start: 2019-08-15 | End: 2020-05-07 | Stop reason: SDUPTHER

## 2019-08-15 RX ORDER — BLOOD-GLUCOSE METER
KIT MISCELLANEOUS
Qty: 250 EACH | Refills: 5 | Status: SHIPPED
Start: 2019-08-15 | End: 2020-03-13 | Stop reason: SDUPTHER

## 2019-08-15 NOTE — LETTER
 Physical activity:     Days per week: Not on file     Minutes per session: Not on file    Stress: Not on file   Relationships    Social connections:     Talks on phone: Not on file     Gets together: Not on file     Attends Baptism service: Not on file     Active member of club or organization: Not on file     Attends meetings of clubs or organizations: Not on file     Relationship status: Not on file    Intimate partner violence:     Fear of current or ex partner: Not on file     Emotionally abused: Not on file     Physically abused: Not on file     Forced sexual activity: Not on file   Other Topics Concern    Not on file   Social History Narrative    Not on file     FAMILY HISTORY   Family History   Problem Relation Age of Onset    Diabetes Maternal Grandmother     Diabetes Paternal Grandmother     Stroke Other     Thyroid Disease Neg Hx      ALLERGIES AND DRUG REACTIONS   No Known Allergies    CURRENT MEDICATIONS     Current Outpatient Medications   Medication Sig Dispense Refill    NOVOLOG FLEXPEN 100 UNIT/ML injection pen Inject 4-10 Units into the skin 3 times daily (before meals) Take 4 units plus  **Corrective Low Dose Algorithm**  Glucose: Dose:               No Insulin  140-199 1 Unit  200-249 2 Units  250-299 3 Units  300-349 4 Units  350-399 5 Units  Over 399 6 Units  1    ibuprofen (ADVIL;MOTRIN) 200 MG tablet Take 800 mg by mouth every 6 hours as needed for Pain      acetone, urine, test strip Use daily as directed if bs >250 x2 or illness      FREESTYLE LITE strip USE AS DIRECTED UP TO 6 TIMES DAILY  2    BD PEN NEEDLE SHELBIE U/F 32G X 4 MM MISC USE AS DIRECTED UP TO 6 TIMES A DAY  2    insulin glargine (BASAGLAR KWIKPEN) 100 UNIT/ML injection pen Inject 25 Units into the skin nightly 5 pen 3     No current facility-administered medications for this visit. Review of Systems  Constitutional: No fever, no chills, no diaphoresis, no generalized weakness. HGB 12.0 08/07/2019 05:45 AM    HCT 35.4 08/07/2019 05:45 AM    MCV 84.3 08/07/2019 05:45 AM    MCH 28.6 08/07/2019 05:45 AM    MCHC 33.9 08/07/2019 05:45 AM    RDW 13.0 08/07/2019 05:45 AM     08/07/2019 05:45 AM    MPV 10.1 08/07/2019 05:45 AM      Lab Results   Component Value Date/Time     08/07/2019 05:45 AM    K 3.9 08/07/2019 05:45 AM    K 5.3 (H) 08/05/2019 06:20 PM    CO2 17 (L) 08/07/2019 05:45 AM    BUN 8 08/07/2019 05:45 AM    CREATININE 0.6 08/07/2019 05:45 AM    CALCIUM 8.7 08/07/2019 05:45 AM    LABGLOM >60 08/07/2019 05:45 AM    GFRAA >60 08/07/2019 05:45 AM      No results found for: TSH, T4FREE, U8OVQTR, FT3, U0XUTRF, TSI, TPOABS, THGAB  Lab Results   Component Value Date    LABA1C 14.2 08/06/2019    GLUCOSE 158 08/07/2019    MALBCR 204.6 01/23/2018    LABMICR 102.3 01/23/2018    LABCREA 50 01/23/2018     Lab Results   Component Value Date    CHOL 175 01/24/2018    TRIG 106 01/24/2018    HDL 40 01/24/2018     No results found for: [address] [facility] [address] Records/Labs/Images review:   I personally reviewed and summarized previous records   All labs were reviewed independently     [address] [place] [address], a 26 y.o.-old female seen in for the following issues     Diabetes Mellitus Type 1    · Patient's diabetes is uncontrol. A1c consistently > 10%  with multiple hospital admissions for DKA   · Take Basaglar 25 units daily, Novolog before meals 1:10g + ss 1:50>150   · The patient is a very good candidate for insulin Pump. Will order insulin Pump with CGM to help with better DM control   · The patient was advised to continue checking blood sugars 4 times a day before meals and at bedtime and fax the results to our office in a week.   · Discussed with patient A1c and blood sugar goals   · Optimal blood sugars: 100-140 pre-prandial, < 180 peak post-prandial  · The patient counseled about the complications of uncontrolled diabetes

## 2019-09-12 ENCOUNTER — HOSPITAL ENCOUNTER (OUTPATIENT)
Age: 24
Discharge: HOME | End: 2019-09-14
Payer: COMMERCIAL

## 2019-09-12 ENCOUNTER — OFFICE VISIT (OUTPATIENT)
Dept: OBGYN | Age: 24
End: 2019-09-12
Payer: COMMERCIAL

## 2019-09-12 VITALS
HEIGHT: 61 IN | SYSTOLIC BLOOD PRESSURE: 109 MMHG | DIASTOLIC BLOOD PRESSURE: 78 MMHG | WEIGHT: 104 LBS | HEART RATE: 108 BPM | BODY MASS INDEX: 19.63 KG/M2

## 2019-09-12 DIAGNOSIS — N89.8 VAGINAL DISCHARGE: Primary | ICD-10-CM

## 2019-09-12 DIAGNOSIS — N89.8 VAGINAL ITCHING: ICD-10-CM

## 2019-09-12 DIAGNOSIS — N89.8 VAGINAL ODOR: ICD-10-CM

## 2019-09-12 DIAGNOSIS — N89.8 VAGINAL DISCHARGE: ICD-10-CM

## 2019-09-12 LAB
CLUE CELLS: ABNORMAL
SOURCE WET PREP: ABNORMAL
TRICHOMONAS PREP: ABNORMAL
YEAST WET PREP: ABNORMAL

## 2019-09-12 PROCEDURE — 87210 SMEAR WET MOUNT SALINE/INK: CPT

## 2019-09-12 PROCEDURE — 99213 OFFICE O/P EST LOW 20 MIN: CPT | Performed by: NURSE PRACTITIONER

## 2019-09-12 PROCEDURE — 1036F TOBACCO NON-USER: CPT | Performed by: NURSE PRACTITIONER

## 2019-09-12 PROCEDURE — G8420 CALC BMI NORM PARAMETERS: HCPCS | Performed by: NURSE PRACTITIONER

## 2019-09-12 PROCEDURE — G8427 DOCREV CUR MEDS BY ELIG CLIN: HCPCS | Performed by: NURSE PRACTITIONER

## 2019-09-12 ASSESSMENT — ENCOUNTER SYMPTOMS
RESPIRATORY NEGATIVE: 1
GASTROINTESTINAL NEGATIVE: 1

## 2019-09-12 NOTE — PROGRESS NOTES
Wet prep obtained, labeled and sent to lab STAT  Urine for pregnancy obtained with negative results  Discharge instructions have been discussed with the patient. Patient advised to call our office with any questions or concerns. Voiced understanding.
experiences any new or worsening complaints, otherwise will call with results and advise patient for further treatment. . She is to call if she has any problems or questions prior to her next visit. All questions and concerns addressed. Patient voices understanding of plan of care.                         Rodriguez Peterson, APRN - CNP

## 2019-09-13 RX ORDER — FLUCONAZOLE 150 MG/1
150 TABLET ORAL DAILY
Qty: 1 TABLET | Refills: 0 | Status: SHIPPED | OUTPATIENT
Start: 2019-09-13 | End: 2019-09-14

## 2019-09-27 ENCOUNTER — TELEPHONE (OUTPATIENT)
Dept: ENDOCRINOLOGY | Age: 24
End: 2019-09-27

## 2019-10-28 ENCOUNTER — APPOINTMENT (OUTPATIENT)
Dept: GENERAL RADIOLOGY | Age: 24
DRG: 420 | End: 2019-10-28
Payer: COMMERCIAL

## 2019-10-28 ENCOUNTER — HOSPITAL ENCOUNTER (INPATIENT)
Age: 24
LOS: 2 days | Discharge: HOME | DRG: 420 | End: 2019-10-30
Attending: EMERGENCY MEDICINE | Admitting: INTERNAL MEDICINE
Payer: COMMERCIAL

## 2019-10-28 DIAGNOSIS — E08.10 DIABETIC KETOACIDOSIS WITHOUT COMA ASSOCIATED WITH DIABETES MELLITUS DUE TO UNDERLYING CONDITION (HCC): Primary | ICD-10-CM

## 2019-10-28 PROBLEM — E10.10 DKA, TYPE 1, NOT AT GOAL (HCC): Status: ACTIVE | Noted: 2019-10-28

## 2019-10-28 LAB
ALBUMIN SERPL-MCNC: 4.5 G/DL (ref 3.5–5.2)
ALP BLD-CCNC: 157 U/L (ref 35–104)
ALT SERPL-CCNC: 37 U/L (ref 0–32)
AMPHETAMINE SCREEN, URINE: NOT DETECTED
ANION GAP SERPL CALCULATED.3IONS-SCNC: 40 MMOL/L (ref 7–16)
AST SERPL-CCNC: 25 U/L (ref 0–31)
BACTERIA: ABNORMAL /HPF
BARBITURATE SCREEN URINE: NOT DETECTED
BASOPHILS ABSOLUTE: 0.1 E9/L (ref 0–0.2)
BASOPHILS RELATIVE PERCENT: 0.6 % (ref 0–2)
BENZODIAZEPINE SCREEN, URINE: NOT DETECTED
BETA-HYDROXYBUTYRATE: ABNORMAL MMOL/L (ref 0.02–0.27)
BILIRUB SERPL-MCNC: 0.3 MG/DL (ref 0–1.2)
BILIRUBIN DIRECT: <0.2 MG/DL (ref 0–0.3)
BILIRUBIN URINE: NEGATIVE
BILIRUBIN, INDIRECT: ABNORMAL MG/DL (ref 0–1)
BLOOD, URINE: ABNORMAL
BUN BLDV-MCNC: 22 MG/DL (ref 6–20)
CALCIUM SERPL-MCNC: 10.3 MG/DL (ref 8.6–10.2)
CANNABINOID SCREEN URINE: NOT DETECTED
CHLORIDE BLD-SCNC: 89 MMOL/L (ref 98–107)
CHP ED QC CHECK: YES
CLARITY: CLEAR
CO2: 4 MMOL/L (ref 22–29)
COCAINE METABOLITE SCREEN URINE: NOT DETECTED
COLOR: ABNORMAL
CREAT SERPL-MCNC: 0.8 MG/DL (ref 0.5–1)
EOSINOPHILS ABSOLUTE: 0 E9/L (ref 0.05–0.5)
EOSINOPHILS RELATIVE PERCENT: 0 % (ref 0–6)
GFR AFRICAN AMERICAN: >60
GFR NON-AFRICAN AMERICAN: >60 ML/MIN/1.73
GLUCOSE BLD-MCNC: 733 MG/DL (ref 74–99)
GLUCOSE URINE: >=1000 MG/DL
HCG(URINE) PREGNANCY TEST: NEGATIVE
HCG, URINE, POC: NEGATIVE
HCT VFR BLD CALC: 46.4 % (ref 34–48)
HEMOGLOBIN: 15.1 G/DL (ref 11.5–15.5)
IMMATURE GRANULOCYTES #: 0.14 E9/L
IMMATURE GRANULOCYTES %: 0.8 % (ref 0–5)
KETONES, URINE: >=80 MG/DL
LACTIC ACID: 2.5 MMOL/L (ref 0.5–2.2)
LEUKOCYTE ESTERASE, URINE: NEGATIVE
LIPASE: 5 U/L (ref 13–60)
LYMPHOCYTES ABSOLUTE: 4.52 E9/L (ref 1.5–4)
LYMPHOCYTES RELATIVE PERCENT: 25.3 % (ref 20–42)
Lab: NORMAL
Lab: NORMAL
MCH RBC QN AUTO: 28.7 PG (ref 26–35)
MCHC RBC AUTO-ENTMCNC: 32.5 % (ref 32–34.5)
MCV RBC AUTO: 88.2 FL (ref 80–99.9)
METER GLUCOSE: >500 MG/DL (ref 74–99)
METER GLUCOSE: >500 MG/DL (ref 74–99)
METHADONE SCREEN, URINE: NOT DETECTED
MONOCYTES ABSOLUTE: 0.73 E9/L (ref 0.1–0.95)
MONOCYTES RELATIVE PERCENT: 4.1 % (ref 2–12)
NEGATIVE QC PASS/FAIL: NORMAL
NEUTROPHILS ABSOLUTE: 12.35 E9/L (ref 1.8–7.3)
NEUTROPHILS RELATIVE PERCENT: 69.2 % (ref 43–80)
NITRITE, URINE: NEGATIVE
OPIATE SCREEN URINE: NOT DETECTED
PDW BLD-RTO: 12.2 FL (ref 11.5–15)
PH UA: 5.5 (ref 5–9)
PH VENOUS: 7.07 (ref 7.35–7.45)
PHENCYCLIDINE SCREEN URINE: NOT DETECTED
PLATELET # BLD: 360 E9/L (ref 130–450)
PMV BLD AUTO: 11.3 FL (ref 7–12)
POSITIVE QC PASS/FAIL: NORMAL
POTASSIUM REFLEX MAGNESIUM: 5.3 MMOL/L (ref 3.5–5)
PROPOXYPHENE SCREEN: NOT DETECTED
PROTEIN UA: ABNORMAL MG/DL
RBC # BLD: 5.26 E12/L (ref 3.5–5.5)
RBC UA: ABNORMAL /HPF (ref 0–2)
SODIUM BLD-SCNC: 133 MMOL/L (ref 132–146)
SPECIFIC GRAVITY UA: 1.02 (ref 1–1.03)
TOTAL PROTEIN: 9.3 G/DL (ref 6.4–8.3)
UROBILINOGEN, URINE: 0.2 E.U./DL
WBC # BLD: 17.8 E9/L (ref 4.5–11.5)
WBC UA: ABNORMAL /HPF (ref 0–5)
YEAST: ABNORMAL

## 2019-10-28 PROCEDURE — 80076 HEPATIC FUNCTION PANEL: CPT

## 2019-10-28 PROCEDURE — 83605 ASSAY OF LACTIC ACID: CPT

## 2019-10-28 PROCEDURE — 2000000000 HC ICU R&B

## 2019-10-28 PROCEDURE — 96376 TX/PRO/DX INJ SAME DRUG ADON: CPT

## 2019-10-28 PROCEDURE — 93005 ELECTROCARDIOGRAM TRACING: CPT | Performed by: GENERAL PRACTICE

## 2019-10-28 PROCEDURE — 80307 DRUG TEST PRSMV CHEM ANLYZR: CPT

## 2019-10-28 PROCEDURE — 82962 GLUCOSE BLOOD TEST: CPT

## 2019-10-28 PROCEDURE — 71045 X-RAY EXAM CHEST 1 VIEW: CPT

## 2019-10-28 PROCEDURE — 2580000003 HC RX 258: Performed by: EMERGENCY MEDICINE

## 2019-10-28 PROCEDURE — 83690 ASSAY OF LIPASE: CPT

## 2019-10-28 PROCEDURE — 96375 TX/PRO/DX INJ NEW DRUG ADDON: CPT

## 2019-10-28 PROCEDURE — 96374 THER/PROPH/DIAG INJ IV PUSH: CPT

## 2019-10-28 PROCEDURE — 36556 INSERT NON-TUNNEL CV CATH: CPT

## 2019-10-28 PROCEDURE — 81025 URINE PREGNANCY TEST: CPT

## 2019-10-28 PROCEDURE — 85025 COMPLETE CBC W/AUTO DIFF WBC: CPT

## 2019-10-28 PROCEDURE — 82800 BLOOD PH: CPT

## 2019-10-28 PROCEDURE — 2580000003 HC RX 258: Performed by: GENERAL PRACTICE

## 2019-10-28 PROCEDURE — 6370000000 HC RX 637 (ALT 250 FOR IP): Performed by: EMERGENCY MEDICINE

## 2019-10-28 PROCEDURE — 80048 BASIC METABOLIC PNL TOTAL CA: CPT

## 2019-10-28 PROCEDURE — 6360000002 HC RX W HCPCS: Performed by: GENERAL PRACTICE

## 2019-10-28 PROCEDURE — 81001 URINALYSIS AUTO W/SCOPE: CPT

## 2019-10-28 PROCEDURE — 36415 COLL VENOUS BLD VENIPUNCTURE: CPT

## 2019-10-28 PROCEDURE — 82010 KETONE BODYS QUAN: CPT

## 2019-10-28 PROCEDURE — 02HV33Z INSERTION OF INFUSION DEVICE INTO SUPERIOR VENA CAVA, PERCUTANEOUS APPROACH: ICD-10-PCS | Performed by: GENERAL PRACTICE

## 2019-10-28 PROCEDURE — 99285 EMERGENCY DEPT VISIT HI MDM: CPT

## 2019-10-28 RX ORDER — SODIUM CHLORIDE 9 MG/ML
INJECTION, SOLUTION INTRAVENOUS CONTINUOUS
Status: DISCONTINUED | OUTPATIENT
Start: 2019-10-29 | End: 2019-10-29

## 2019-10-28 RX ORDER — MAGNESIUM SULFATE 1 G/100ML
1 INJECTION INTRAVENOUS PRN
Status: DISCONTINUED | OUTPATIENT
Start: 2019-10-28 | End: 2019-10-29

## 2019-10-28 RX ORDER — DEXTROSE MONOHYDRATE 25 G/50ML
12.5 INJECTION, SOLUTION INTRAVENOUS PRN
Status: DISCONTINUED | OUTPATIENT
Start: 2019-10-28 | End: 2019-10-30 | Stop reason: HOSPADM

## 2019-10-28 RX ORDER — DEXTROSE AND SODIUM CHLORIDE 5; .45 G/100ML; G/100ML
INJECTION, SOLUTION INTRAVENOUS CONTINUOUS PRN
Status: DISCONTINUED | OUTPATIENT
Start: 2019-10-28 | End: 2019-10-29

## 2019-10-28 RX ORDER — FENTANYL CITRATE 50 UG/ML
100 INJECTION, SOLUTION INTRAMUSCULAR; INTRAVENOUS ONCE
Status: COMPLETED | OUTPATIENT
Start: 2019-10-28 | End: 2019-10-28

## 2019-10-28 RX ORDER — 0.9 % SODIUM CHLORIDE 0.9 %
15 INTRAVENOUS SOLUTION INTRAVENOUS ONCE
Status: DISCONTINUED | OUTPATIENT
Start: 2019-10-28 | End: 2019-10-29

## 2019-10-28 RX ORDER — 0.9 % SODIUM CHLORIDE 0.9 %
1000 INTRAVENOUS SOLUTION INTRAVENOUS ONCE
Status: COMPLETED | OUTPATIENT
Start: 2019-10-28 | End: 2019-10-28

## 2019-10-28 RX ORDER — ONDANSETRON 2 MG/ML
4 INJECTION INTRAMUSCULAR; INTRAVENOUS EVERY 6 HOURS PRN
Status: DISCONTINUED | OUTPATIENT
Start: 2019-10-28 | End: 2019-10-30 | Stop reason: HOSPADM

## 2019-10-28 RX ORDER — POTASSIUM CHLORIDE 7.45 MG/ML
10 INJECTION INTRAVENOUS PRN
Status: DISCONTINUED | OUTPATIENT
Start: 2019-10-28 | End: 2019-10-29 | Stop reason: ALTCHOICE

## 2019-10-28 RX ADMIN — SODIUM CHLORIDE 1000 ML: 9 INJECTION, SOLUTION INTRAVENOUS at 21:03

## 2019-10-28 RX ADMIN — ONDANSETRON 4 MG: 2 INJECTION INTRAMUSCULAR; INTRAVENOUS at 21:05

## 2019-10-28 RX ADMIN — FENTANYL CITRATE 100 MCG: 50 INJECTION, SOLUTION INTRAMUSCULAR; INTRAVENOUS at 21:05

## 2019-10-28 RX ADMIN — FENTANYL CITRATE 100 MCG: 50 INJECTION, SOLUTION INTRAMUSCULAR; INTRAVENOUS at 22:14

## 2019-10-28 RX ADMIN — SODIUM CHLORIDE 0.1 UNITS/KG/HR: 9 INJECTION, SOLUTION INTRAVENOUS at 23:18

## 2019-10-28 ASSESSMENT — PAIN SCALES - GENERAL
PAINLEVEL_OUTOF10: 8
PAINLEVEL_OUTOF10: 9
PAINLEVEL_OUTOF10: 9
PAINLEVEL_OUTOF10: 7

## 2019-10-28 ASSESSMENT — ENCOUNTER SYMPTOMS
VOMITING: 0
WHEEZING: 0
CHEST TIGHTNESS: 0
COUGH: 0
SORE THROAT: 0
NAUSEA: 1
SHORTNESS OF BREATH: 0
SINUS PAIN: 0
ABDOMINAL PAIN: 1
DIARRHEA: 0
EYE PAIN: 0
CHOKING: 0

## 2019-10-28 ASSESSMENT — PAIN DESCRIPTION - ORIENTATION
ORIENTATION: LEFT;LOWER
ORIENTATION: LEFT
ORIENTATION: LEFT

## 2019-10-28 ASSESSMENT — PAIN DESCRIPTION - LOCATION
LOCATION: ABDOMEN;FLANK;BACK
LOCATION: ABDOMEN;FLANK
LOCATION: ABDOMEN;FLANK

## 2019-10-28 ASSESSMENT — PAIN DESCRIPTION - FREQUENCY
FREQUENCY: CONTINUOUS
FREQUENCY: INTERMITTENT

## 2019-10-28 ASSESSMENT — PAIN DESCRIPTION - DESCRIPTORS: DESCRIPTORS: ACHING

## 2019-10-28 ASSESSMENT — PAIN DESCRIPTION - PAIN TYPE
TYPE: ACUTE PAIN
TYPE: ACUTE PAIN

## 2019-10-29 LAB
ANION GAP SERPL CALCULATED.3IONS-SCNC: 13 MMOL/L (ref 7–16)
ANION GAP SERPL CALCULATED.3IONS-SCNC: 14 MMOL/L (ref 7–16)
ANION GAP SERPL CALCULATED.3IONS-SCNC: 20 MMOL/L (ref 7–16)
ANION GAP SERPL CALCULATED.3IONS-SCNC: 34 MMOL/L (ref 7–16)
BASOPHILS ABSOLUTE: 0.08 E9/L (ref 0–0.2)
BASOPHILS RELATIVE PERCENT: 0.4 % (ref 0–2)
BUN BLDV-MCNC: 14 MG/DL (ref 6–20)
BUN BLDV-MCNC: 16 MG/DL (ref 6–20)
BUN BLDV-MCNC: 18 MG/DL (ref 6–20)
BUN BLDV-MCNC: 21 MG/DL (ref 6–20)
CALCIUM SERPL-MCNC: 9.2 MG/DL (ref 8.6–10.2)
CALCIUM SERPL-MCNC: 9.3 MG/DL (ref 8.6–10.2)
CALCIUM SERPL-MCNC: 9.5 MG/DL (ref 8.6–10.2)
CALCIUM SERPL-MCNC: 9.7 MG/DL (ref 8.6–10.2)
CHLORIDE BLD-SCNC: 106 MMOL/L (ref 98–107)
CHLORIDE BLD-SCNC: 97 MMOL/L (ref 98–107)
CO2: 14 MMOL/L (ref 22–29)
CO2: 17 MMOL/L (ref 22–29)
CO2: 18 MMOL/L (ref 22–29)
CO2: 4 MMOL/L (ref 22–29)
CREAT SERPL-MCNC: 0.6 MG/DL (ref 0.5–1)
CREAT SERPL-MCNC: 0.6 MG/DL (ref 0.5–1)
CREAT SERPL-MCNC: 0.7 MG/DL (ref 0.5–1)
CREAT SERPL-MCNC: 0.8 MG/DL (ref 0.5–1)
EKG ATRIAL RATE: 138 BPM
EKG P AXIS: 79 DEGREES
EKG P-R INTERVAL: 120 MS
EKG Q-T INTERVAL: 288 MS
EKG QRS DURATION: 60 MS
EKG QTC CALCULATION (BAZETT): 436 MS
EKG R AXIS: 70 DEGREES
EKG T AXIS: 46 DEGREES
EKG VENTRICULAR RATE: 138 BPM
EOSINOPHILS ABSOLUTE: 0.01 E9/L (ref 0.05–0.5)
EOSINOPHILS RELATIVE PERCENT: 0.1 % (ref 0–6)
GFR AFRICAN AMERICAN: >60
GFR NON-AFRICAN AMERICAN: >60 ML/MIN/1.73
GLUCOSE BLD-MCNC: 166 MG/DL (ref 74–99)
GLUCOSE BLD-MCNC: 182 MG/DL (ref 74–99)
GLUCOSE BLD-MCNC: 203 MG/DL (ref 74–99)
GLUCOSE BLD-MCNC: 527 MG/DL (ref 74–99)
HCT VFR BLD CALC: 40.8 % (ref 34–48)
HEMOGLOBIN: 13.5 G/DL (ref 11.5–15.5)
IMMATURE GRANULOCYTES #: 0.17 E9/L
IMMATURE GRANULOCYTES %: 0.9 % (ref 0–5)
LACTIC ACID: 1.7 MMOL/L (ref 0.5–2.2)
LYMPHOCYTES ABSOLUTE: 5.85 E9/L (ref 1.5–4)
LYMPHOCYTES RELATIVE PERCENT: 30.2 % (ref 20–42)
MAGNESIUM: 1.9 MG/DL (ref 1.6–2.6)
MAGNESIUM: 2 MG/DL (ref 1.6–2.6)
MAGNESIUM: 2.1 MG/DL (ref 1.6–2.6)
MCH RBC QN AUTO: 28.3 PG (ref 26–35)
MCHC RBC AUTO-ENTMCNC: 33.1 % (ref 32–34.5)
MCV RBC AUTO: 85.5 FL (ref 80–99.9)
METER GLUCOSE: 109 MG/DL (ref 74–99)
METER GLUCOSE: 139 MG/DL (ref 74–99)
METER GLUCOSE: 140 MG/DL (ref 74–99)
METER GLUCOSE: 152 MG/DL (ref 74–99)
METER GLUCOSE: 153 MG/DL (ref 74–99)
METER GLUCOSE: 161 MG/DL (ref 74–99)
METER GLUCOSE: 169 MG/DL (ref 74–99)
METER GLUCOSE: 171 MG/DL (ref 74–99)
METER GLUCOSE: 176 MG/DL (ref 74–99)
METER GLUCOSE: 194 MG/DL (ref 74–99)
METER GLUCOSE: 210 MG/DL (ref 74–99)
METER GLUCOSE: 249 MG/DL (ref 74–99)
METER GLUCOSE: >500 MG/DL (ref 74–99)
MONOCYTES ABSOLUTE: 1.15 E9/L (ref 0.1–0.95)
MONOCYTES RELATIVE PERCENT: 5.9 % (ref 2–12)
NEUTROPHILS ABSOLUTE: 12.12 E9/L (ref 1.8–7.3)
NEUTROPHILS RELATIVE PERCENT: 62.5 % (ref 43–80)
PDW BLD-RTO: 12.1 FL (ref 11.5–15)
PHOSPHORUS: 2.4 MG/DL (ref 2.5–4.5)
PHOSPHORUS: 3.3 MG/DL (ref 2.5–4.5)
PHOSPHORUS: 5.3 MG/DL (ref 2.5–4.5)
PLATELET # BLD: 344 E9/L (ref 130–450)
PMV BLD AUTO: 10.9 FL (ref 7–12)
POTASSIUM SERPL-SCNC: 4 MMOL/L (ref 3.5–5)
POTASSIUM SERPL-SCNC: 4 MMOL/L (ref 3.5–5)
POTASSIUM SERPL-SCNC: 4.1 MMOL/L (ref 3.5–5)
POTASSIUM SERPL-SCNC: 4.3 MMOL/L (ref 3.5–5)
RBC # BLD: 4.77 E12/L (ref 3.5–5.5)
SODIUM BLD-SCNC: 135 MMOL/L (ref 132–146)
SODIUM BLD-SCNC: 137 MMOL/L (ref 132–146)
SODIUM BLD-SCNC: 137 MMOL/L (ref 132–146)
SODIUM BLD-SCNC: 140 MMOL/L (ref 132–146)
WBC # BLD: 19.4 E9/L (ref 4.5–11.5)

## 2019-10-29 PROCEDURE — 6360000002 HC RX W HCPCS: Performed by: INTERNAL MEDICINE

## 2019-10-29 PROCEDURE — 83605 ASSAY OF LACTIC ACID: CPT

## 2019-10-29 PROCEDURE — 6370000000 HC RX 637 (ALT 250 FOR IP): Performed by: INTERNAL MEDICINE

## 2019-10-29 PROCEDURE — 85025 COMPLETE CBC W/AUTO DIFF WBC: CPT

## 2019-10-29 PROCEDURE — 2580000003 HC RX 258: Performed by: INTERNAL MEDICINE

## 2019-10-29 PROCEDURE — 93010 ELECTROCARDIOGRAM REPORT: CPT | Performed by: INTERNAL MEDICINE

## 2019-10-29 PROCEDURE — 82962 GLUCOSE BLOOD TEST: CPT

## 2019-10-29 PROCEDURE — 2580000003 HC RX 258: Performed by: EMERGENCY MEDICINE

## 2019-10-29 PROCEDURE — 36592 COLLECT BLOOD FROM PICC: CPT

## 2019-10-29 PROCEDURE — 80048 BASIC METABOLIC PNL TOTAL CA: CPT

## 2019-10-29 PROCEDURE — 99999 PR OFFICE/OUTPT VISIT,PROCEDURE ONLY: CPT | Performed by: INTERNAL MEDICINE

## 2019-10-29 PROCEDURE — 87081 CULTURE SCREEN ONLY: CPT

## 2019-10-29 PROCEDURE — 99291 CRITICAL CARE FIRST HOUR: CPT | Performed by: INTERNAL MEDICINE

## 2019-10-29 PROCEDURE — 2000000000 HC ICU R&B

## 2019-10-29 PROCEDURE — 36415 COLL VENOUS BLD VENIPUNCTURE: CPT

## 2019-10-29 PROCEDURE — 84100 ASSAY OF PHOSPHORUS: CPT

## 2019-10-29 PROCEDURE — 83735 ASSAY OF MAGNESIUM: CPT

## 2019-10-29 PROCEDURE — 36556 INSERT NON-TUNNEL CV CATH: CPT

## 2019-10-29 RX ORDER — MORPHINE SULFATE 2 MG/ML
1 INJECTION, SOLUTION INTRAMUSCULAR; INTRAVENOUS
Status: DISCONTINUED | OUTPATIENT
Start: 2019-10-29 | End: 2019-10-30 | Stop reason: HOSPADM

## 2019-10-29 RX ORDER — ACETAMINOPHEN 325 MG/1
650 TABLET ORAL EVERY 4 HOURS PRN
Status: DISCONTINUED | OUTPATIENT
Start: 2019-10-29 | End: 2019-10-30 | Stop reason: HOSPADM

## 2019-10-29 RX ORDER — POTASSIUM CHLORIDE 29.8 MG/ML
20 INJECTION INTRAVENOUS PRN
Status: DISCONTINUED | OUTPATIENT
Start: 2019-10-29 | End: 2019-10-29

## 2019-10-29 RX ORDER — DEXTROSE MONOHYDRATE 50 MG/ML
100 INJECTION, SOLUTION INTRAVENOUS PRN
Status: DISCONTINUED | OUTPATIENT
Start: 2019-10-29 | End: 2019-10-30 | Stop reason: HOSPADM

## 2019-10-29 RX ORDER — SODIUM CHLORIDE 0.9 % (FLUSH) 0.9 %
10 SYRINGE (ML) INJECTION PRN
Status: DISCONTINUED | OUTPATIENT
Start: 2019-10-29 | End: 2019-10-30 | Stop reason: HOSPADM

## 2019-10-29 RX ORDER — NICOTINE POLACRILEX 4 MG
15 LOZENGE BUCCAL PRN
Status: DISCONTINUED | OUTPATIENT
Start: 2019-10-29 | End: 2019-10-30 | Stop reason: HOSPADM

## 2019-10-29 RX ORDER — INSULIN GLARGINE 100 [IU]/ML
25 INJECTION, SOLUTION SUBCUTANEOUS DAILY
Status: DISCONTINUED | OUTPATIENT
Start: 2019-10-29 | End: 2019-10-30 | Stop reason: HOSPADM

## 2019-10-29 RX ORDER — DEXTROSE MONOHYDRATE 25 G/50ML
12.5 INJECTION, SOLUTION INTRAVENOUS PRN
Status: DISCONTINUED | OUTPATIENT
Start: 2019-10-29 | End: 2019-10-30 | Stop reason: HOSPADM

## 2019-10-29 RX ORDER — SODIUM CHLORIDE 0.9 % (FLUSH) 0.9 %
10 SYRINGE (ML) INJECTION EVERY 12 HOURS SCHEDULED
Status: DISCONTINUED | OUTPATIENT
Start: 2019-10-29 | End: 2019-10-30 | Stop reason: HOSPADM

## 2019-10-29 RX ADMIN — Medication 10 ML: at 08:39

## 2019-10-29 RX ADMIN — MORPHINE SULFATE 1 MG: 2 INJECTION, SOLUTION INTRAMUSCULAR; INTRAVENOUS at 00:47

## 2019-10-29 RX ADMIN — MORPHINE SULFATE 1 MG: 2 INJECTION, SOLUTION INTRAMUSCULAR; INTRAVENOUS at 21:00

## 2019-10-29 RX ADMIN — MORPHINE SULFATE 1 MG: 2 INJECTION, SOLUTION INTRAMUSCULAR; INTRAVENOUS at 16:45

## 2019-10-29 RX ADMIN — MORPHINE SULFATE 1 MG: 2 INJECTION, SOLUTION INTRAMUSCULAR; INTRAVENOUS at 03:47

## 2019-10-29 RX ADMIN — DEXTROSE AND SODIUM CHLORIDE: 5; 450 INJECTION, SOLUTION INTRAVENOUS at 02:49

## 2019-10-29 RX ADMIN — MORPHINE SULFATE 1 MG: 2 INJECTION, SOLUTION INTRAMUSCULAR; INTRAVENOUS at 09:40

## 2019-10-29 RX ADMIN — INSULIN LISPRO 2 UNITS: 100 INJECTION, SOLUTION INTRAVENOUS; SUBCUTANEOUS at 16:45

## 2019-10-29 RX ADMIN — INSULIN GLARGINE 25 UNITS: 100 INJECTION, SOLUTION SUBCUTANEOUS at 11:25

## 2019-10-29 RX ADMIN — SODIUM CHLORIDE: 9 INJECTION, SOLUTION INTRAVENOUS at 00:15

## 2019-10-29 ASSESSMENT — PAIN DESCRIPTION - DESCRIPTORS
DESCRIPTORS: ACHING
DESCRIPTORS: ACHING
DESCRIPTORS: ACHING;DISCOMFORT;DULL
DESCRIPTORS: CRAMPING;ACHING;SORE
DESCRIPTORS: ACHING;DULL;DISCOMFORT

## 2019-10-29 ASSESSMENT — PAIN DESCRIPTION - LOCATION
LOCATION: ABDOMEN
LOCATION: BACK
LOCATION: ABDOMEN
LOCATION: BACK
LOCATION: BACK

## 2019-10-29 ASSESSMENT — PAIN DESCRIPTION - FREQUENCY
FREQUENCY: CONTINUOUS
FREQUENCY: INTERMITTENT
FREQUENCY: CONTINUOUS

## 2019-10-29 ASSESSMENT — PAIN DESCRIPTION - ONSET
ONSET: ON-GOING
ONSET: AWAKENED FROM SLEEP
ONSET: ON-GOING
ONSET: ON-GOING

## 2019-10-29 ASSESSMENT — PAIN DESCRIPTION - PAIN TYPE
TYPE: CHRONIC PAIN
TYPE: ACUTE PAIN

## 2019-10-29 ASSESSMENT — PAIN SCALES - GENERAL
PAINLEVEL_OUTOF10: 10
PAINLEVEL_OUTOF10: 0
PAINLEVEL_OUTOF10: 8
PAINLEVEL_OUTOF10: 9
PAINLEVEL_OUTOF10: 0
PAINLEVEL_OUTOF10: 0
PAINLEVEL_OUTOF10: 9
PAINLEVEL_OUTOF10: 0
PAINLEVEL_OUTOF10: 0
PAINLEVEL_OUTOF10: 9

## 2019-10-29 ASSESSMENT — PAIN DESCRIPTION - ORIENTATION
ORIENTATION: LOWER
ORIENTATION: MID
ORIENTATION: MID
ORIENTATION: MID;LOWER
ORIENTATION: MID;LOWER

## 2019-10-29 ASSESSMENT — PAIN DESCRIPTION - PROGRESSION
CLINICAL_PROGRESSION: GRADUALLY WORSENING
CLINICAL_PROGRESSION: GRADUALLY WORSENING
CLINICAL_PROGRESSION: NOT CHANGED
CLINICAL_PROGRESSION: GRADUALLY WORSENING

## 2019-10-29 ASSESSMENT — PAIN - FUNCTIONAL ASSESSMENT
PAIN_FUNCTIONAL_ASSESSMENT: ACTIVITIES ARE NOT PREVENTED

## 2019-10-30 VITALS
TEMPERATURE: 98.7 F | RESPIRATION RATE: 14 BRPM | DIASTOLIC BLOOD PRESSURE: 72 MMHG | BODY MASS INDEX: 23.29 KG/M2 | HEART RATE: 107 BPM | WEIGHT: 126.54 LBS | HEIGHT: 62 IN | SYSTOLIC BLOOD PRESSURE: 119 MMHG | OXYGEN SATURATION: 99 %

## 2019-10-30 LAB
ANION GAP SERPL CALCULATED.3IONS-SCNC: 12 MMOL/L (ref 7–16)
BASOPHILS ABSOLUTE: 0.03 E9/L (ref 0–0.2)
BASOPHILS RELATIVE PERCENT: 0.3 % (ref 0–2)
BUN BLDV-MCNC: 9 MG/DL (ref 6–20)
CALCIUM SERPL-MCNC: 9.3 MG/DL (ref 8.6–10.2)
CHLORIDE BLD-SCNC: 105 MMOL/L (ref 98–107)
CO2: 22 MMOL/L (ref 22–29)
CREAT SERPL-MCNC: 0.6 MG/DL (ref 0.5–1)
EOSINOPHILS ABSOLUTE: 0.09 E9/L (ref 0.05–0.5)
EOSINOPHILS RELATIVE PERCENT: 1 % (ref 0–6)
GFR AFRICAN AMERICAN: >60
GFR NON-AFRICAN AMERICAN: >60 ML/MIN/1.73
GLUCOSE BLD-MCNC: 130 MG/DL (ref 74–99)
HCT VFR BLD CALC: 35 % (ref 34–48)
HEMOGLOBIN: 11.5 G/DL (ref 11.5–15.5)
IMMATURE GRANULOCYTES #: 0.03 E9/L
IMMATURE GRANULOCYTES %: 0.3 % (ref 0–5)
LYMPHOCYTES ABSOLUTE: 2.57 E9/L (ref 1.5–4)
LYMPHOCYTES RELATIVE PERCENT: 29 % (ref 20–42)
MAGNESIUM: 1.8 MG/DL (ref 1.6–2.6)
MCH RBC QN AUTO: 27.9 PG (ref 26–35)
MCHC RBC AUTO-ENTMCNC: 32.9 % (ref 32–34.5)
MCV RBC AUTO: 85 FL (ref 80–99.9)
METER GLUCOSE: 122 MG/DL (ref 74–99)
METER GLUCOSE: 124 MG/DL (ref 74–99)
MONOCYTES ABSOLUTE: 0.55 E9/L (ref 0.1–0.95)
MONOCYTES RELATIVE PERCENT: 6.2 % (ref 2–12)
MRSA CULTURE ONLY: NORMAL
NEUTROPHILS ABSOLUTE: 5.58 E9/L (ref 1.8–7.3)
NEUTROPHILS RELATIVE PERCENT: 63.2 % (ref 43–80)
PDW BLD-RTO: 12.3 FL (ref 11.5–15)
PHOSPHORUS: 2.7 MG/DL (ref 2.5–4.5)
PLATELET # BLD: 252 E9/L (ref 130–450)
PMV BLD AUTO: 10.4 FL (ref 7–12)
POTASSIUM SERPL-SCNC: 3.6 MMOL/L (ref 3.5–5)
RBC # BLD: 4.12 E12/L (ref 3.5–5.5)
SODIUM BLD-SCNC: 139 MMOL/L (ref 132–146)
WBC # BLD: 8.9 E9/L (ref 4.5–11.5)

## 2019-10-30 PROCEDURE — 36592 COLLECT BLOOD FROM PICC: CPT

## 2019-10-30 PROCEDURE — 83735 ASSAY OF MAGNESIUM: CPT

## 2019-10-30 PROCEDURE — 82962 GLUCOSE BLOOD TEST: CPT

## 2019-10-30 PROCEDURE — 80048 BASIC METABOLIC PNL TOTAL CA: CPT

## 2019-10-30 PROCEDURE — 36415 COLL VENOUS BLD VENIPUNCTURE: CPT

## 2019-10-30 PROCEDURE — 85025 COMPLETE CBC W/AUTO DIFF WBC: CPT

## 2019-10-30 PROCEDURE — 99239 HOSP IP/OBS DSCHRG MGMT >30: CPT | Performed by: INTERNAL MEDICINE

## 2019-10-30 PROCEDURE — 2580000003 HC RX 258: Performed by: INTERNAL MEDICINE

## 2019-10-30 PROCEDURE — 6360000002 HC RX W HCPCS: Performed by: INTERNAL MEDICINE

## 2019-10-30 PROCEDURE — 6370000000 HC RX 637 (ALT 250 FOR IP): Performed by: INTERNAL MEDICINE

## 2019-10-30 PROCEDURE — 84100 ASSAY OF PHOSPHORUS: CPT

## 2019-10-30 RX ADMIN — MORPHINE SULFATE 1 MG: 2 INJECTION, SOLUTION INTRAMUSCULAR; INTRAVENOUS at 00:42

## 2019-10-30 RX ADMIN — MORPHINE SULFATE 1 MG: 2 INJECTION, SOLUTION INTRAMUSCULAR; INTRAVENOUS at 06:40

## 2019-10-30 RX ADMIN — MORPHINE SULFATE 1 MG: 2 INJECTION, SOLUTION INTRAMUSCULAR; INTRAVENOUS at 03:56

## 2019-10-30 RX ADMIN — Medication 10 ML: at 08:38

## 2019-10-30 RX ADMIN — MORPHINE SULFATE 1 MG: 2 INJECTION, SOLUTION INTRAMUSCULAR; INTRAVENOUS at 10:30

## 2019-10-30 RX ADMIN — INSULIN GLARGINE 25 UNITS: 100 INJECTION, SOLUTION SUBCUTANEOUS at 08:34

## 2019-10-30 ASSESSMENT — PAIN SCALES - GENERAL
PAINLEVEL_OUTOF10: 0
PAINLEVEL_OUTOF10: 0
PAINLEVEL_OUTOF10: 7
PAINLEVEL_OUTOF10: 3
PAINLEVEL_OUTOF10: 9
PAINLEVEL_OUTOF10: 0
PAINLEVEL_OUTOF10: 9
PAINLEVEL_OUTOF10: 0
PAINLEVEL_OUTOF10: 9

## 2019-10-30 ASSESSMENT — PAIN DESCRIPTION - ONSET
ONSET: AWAKENED FROM SLEEP
ONSET: ON-GOING

## 2019-10-30 ASSESSMENT — PAIN DESCRIPTION - LOCATION
LOCATION: BACK;GENERALIZED
LOCATION: BACK
LOCATION: BACK

## 2019-10-30 ASSESSMENT — PAIN DESCRIPTION - ORIENTATION
ORIENTATION: LOWER

## 2019-10-30 ASSESSMENT — PAIN DESCRIPTION - PROGRESSION
CLINICAL_PROGRESSION: NOT CHANGED

## 2019-10-30 ASSESSMENT — PAIN DESCRIPTION - PAIN TYPE
TYPE: CHRONIC PAIN

## 2019-10-30 ASSESSMENT — PAIN DESCRIPTION - FREQUENCY
FREQUENCY: INTERMITTENT

## 2019-10-30 ASSESSMENT — PAIN - FUNCTIONAL ASSESSMENT
PAIN_FUNCTIONAL_ASSESSMENT: ACTIVITIES ARE NOT PREVENTED

## 2019-10-30 ASSESSMENT — PAIN DESCRIPTION - DESCRIPTORS
DESCRIPTORS: DISCOMFORT
DESCRIPTORS: CRAMPING;DISCOMFORT
DESCRIPTORS: ACHING
DESCRIPTORS: DISCOMFORT

## 2019-11-18 ENCOUNTER — TELEPHONE (OUTPATIENT)
Dept: ENDOCRINOLOGY | Age: 24
End: 2019-11-18

## 2019-11-21 ENCOUNTER — HOSPITAL ENCOUNTER (EMERGENCY)
Age: 24
Discharge: HOME | End: 2019-11-22
Payer: COMMERCIAL

## 2019-11-21 VITALS
DIASTOLIC BLOOD PRESSURE: 87 MMHG | BODY MASS INDEX: 19.83 KG/M2 | WEIGHT: 105 LBS | TEMPERATURE: 98.2 F | RESPIRATION RATE: 16 BRPM | HEIGHT: 61 IN | OXYGEN SATURATION: 97 % | HEART RATE: 103 BPM | SYSTOLIC BLOOD PRESSURE: 136 MMHG

## 2019-11-21 DIAGNOSIS — R51.9 NONINTRACTABLE HEADACHE, UNSPECIFIED CHRONICITY PATTERN, UNSPECIFIED HEADACHE TYPE: ICD-10-CM

## 2019-11-21 DIAGNOSIS — H10.9 CONJUNCTIVITIS OF RIGHT EYE, UNSPECIFIED CONJUNCTIVITIS TYPE: Primary | ICD-10-CM

## 2019-11-21 PROCEDURE — 99283 EMERGENCY DEPT VISIT LOW MDM: CPT

## 2019-11-21 RX ORDER — NAPROXEN 250 MG/1
500 TABLET ORAL ONCE
Status: COMPLETED | OUTPATIENT
Start: 2019-11-22 | End: 2019-11-22

## 2019-11-22 LAB
HCG, URINE, POC: NEGATIVE
Lab: NORMAL
NEGATIVE QC PASS/FAIL: NORMAL
POSITIVE QC PASS/FAIL: NORMAL

## 2019-11-22 PROCEDURE — 6370000000 HC RX 637 (ALT 250 FOR IP): Performed by: PHYSICIAN ASSISTANT

## 2019-11-22 RX ORDER — ERYTHROMYCIN 5 MG/G
OINTMENT OPHTHALMIC
Qty: 1 TUBE | Refills: 0 | Status: SHIPPED | OUTPATIENT
Start: 2019-11-22 | End: 2020-03-06

## 2019-11-22 RX ADMIN — NAPROXEN 500 MG: 250 TABLET ORAL at 00:09

## 2019-11-22 ASSESSMENT — PAIN SCALES - GENERAL: PAINLEVEL_OUTOF10: 2

## 2019-11-25 ENCOUNTER — APPOINTMENT (OUTPATIENT)
Dept: CT IMAGING | Age: 24
End: 2019-11-25
Payer: COMMERCIAL

## 2019-11-25 ENCOUNTER — HOSPITAL ENCOUNTER (EMERGENCY)
Age: 24
Discharge: HOME | End: 2019-11-26
Payer: COMMERCIAL

## 2019-11-25 DIAGNOSIS — H30.91 RIGHT RETINITIS: Primary | ICD-10-CM

## 2019-11-25 LAB
HCG, URINE, POC: NEGATIVE
Lab: NORMAL
NEGATIVE QC PASS/FAIL: NORMAL
POSITIVE QC PASS/FAIL: NORMAL

## 2019-11-25 PROCEDURE — 70480 CT ORBIT/EAR/FOSSA W/O DYE: CPT

## 2019-11-25 PROCEDURE — 99283 EMERGENCY DEPT VISIT LOW MDM: CPT

## 2019-11-25 PROCEDURE — 6370000000 HC RX 637 (ALT 250 FOR IP): Performed by: NURSE PRACTITIONER

## 2019-11-25 RX ORDER — PREDNISOLONE ACETATE 10 MG/ML
1 SUSPENSION/ DROPS OPHTHALMIC ONCE
Status: COMPLETED | OUTPATIENT
Start: 2019-11-25 | End: 2019-11-26

## 2019-11-25 RX ORDER — IBUPROFEN 800 MG/1
800 TABLET ORAL ONCE
Status: COMPLETED | OUTPATIENT
Start: 2019-11-25 | End: 2019-11-25

## 2019-11-25 RX ADMIN — IBUPROFEN 800 MG: 800 TABLET, FILM COATED ORAL at 23:17

## 2019-11-25 ASSESSMENT — PAIN DESCRIPTION - ORIENTATION: ORIENTATION: RIGHT

## 2019-11-25 ASSESSMENT — PAIN DESCRIPTION - LOCATION: LOCATION: EYE

## 2019-11-25 ASSESSMENT — PAIN DESCRIPTION - DESCRIPTORS: DESCRIPTORS: ACHING

## 2019-11-25 ASSESSMENT — PAIN SCALES - GENERAL
PAINLEVEL_OUTOF10: 6
PAINLEVEL_OUTOF10: 6

## 2019-11-25 ASSESSMENT — PAIN DESCRIPTION - FREQUENCY: FREQUENCY: CONTINUOUS

## 2019-11-25 ASSESSMENT — PAIN - FUNCTIONAL ASSESSMENT: PAIN_FUNCTIONAL_ASSESSMENT: ACTIVITIES ARE NOT PREVENTED

## 2019-11-25 ASSESSMENT — PAIN DESCRIPTION - PAIN TYPE: TYPE: ACUTE PAIN

## 2019-11-26 VITALS
WEIGHT: 105 LBS | OXYGEN SATURATION: 99 % | DIASTOLIC BLOOD PRESSURE: 88 MMHG | BODY MASS INDEX: 19.83 KG/M2 | SYSTOLIC BLOOD PRESSURE: 133 MMHG | TEMPERATURE: 98.1 F | HEIGHT: 61 IN | RESPIRATION RATE: 16 BRPM | HEART RATE: 104 BPM

## 2019-11-26 PROCEDURE — 6370000000 HC RX 637 (ALT 250 FOR IP): Performed by: NURSE PRACTITIONER

## 2019-11-26 RX ORDER — IBUPROFEN 800 MG/1
800 TABLET ORAL EVERY 6 HOURS PRN
Qty: 20 TABLET | Refills: 0 | Status: SHIPPED | OUTPATIENT
Start: 2019-11-26 | End: 2020-03-06

## 2019-11-26 RX ADMIN — PREDNISOLONE ACETATE 1 DROP: 10 SUSPENSION/ DROPS OPHTHALMIC at 00:24

## 2019-11-26 ASSESSMENT — PAIN DESCRIPTION - ORIENTATION: ORIENTATION: RIGHT

## 2019-11-26 ASSESSMENT — PAIN DESCRIPTION - DESCRIPTORS: DESCRIPTORS: ACHING

## 2019-11-26 ASSESSMENT — PAIN DESCRIPTION - LOCATION: LOCATION: EYE

## 2019-11-26 ASSESSMENT — PAIN DESCRIPTION - PAIN TYPE: TYPE: ACUTE PAIN

## 2019-11-26 ASSESSMENT — PAIN SCALES - GENERAL: PAINLEVEL_OUTOF10: 4

## 2019-11-26 ASSESSMENT — PAIN DESCRIPTION - FREQUENCY: FREQUENCY: CONTINUOUS

## 2019-11-30 ENCOUNTER — HOSPITAL ENCOUNTER (EMERGENCY)
Age: 24
Discharge: HOME | End: 2019-11-30
Attending: EMERGENCY MEDICINE
Payer: COMMERCIAL

## 2019-11-30 ENCOUNTER — APPOINTMENT (OUTPATIENT)
Dept: GENERAL RADIOLOGY | Age: 24
End: 2019-11-30
Payer: COMMERCIAL

## 2019-11-30 VITALS
DIASTOLIC BLOOD PRESSURE: 67 MMHG | HEART RATE: 100 BPM | HEIGHT: 61 IN | BODY MASS INDEX: 19.83 KG/M2 | WEIGHT: 105 LBS | SYSTOLIC BLOOD PRESSURE: 112 MMHG | OXYGEN SATURATION: 99 % | RESPIRATION RATE: 18 BRPM | TEMPERATURE: 98.7 F

## 2019-11-30 DIAGNOSIS — R73.9 HYPERGLYCEMIA: Primary | ICD-10-CM

## 2019-11-30 DIAGNOSIS — N30.00 ACUTE CYSTITIS WITHOUT HEMATURIA: ICD-10-CM

## 2019-11-30 LAB
ALBUMIN SERPL-MCNC: 3.9 G/DL (ref 3.5–5.2)
ALP BLD-CCNC: 102 U/L (ref 35–104)
ALT SERPL-CCNC: 29 U/L (ref 0–32)
ANION GAP SERPL CALCULATED.3IONS-SCNC: 13 MMOL/L (ref 7–16)
AST SERPL-CCNC: 50 U/L (ref 0–31)
BACTERIA: ABNORMAL /HPF
BASOPHILS ABSOLUTE: 0.05 E9/L (ref 0–0.2)
BASOPHILS RELATIVE PERCENT: 0.6 % (ref 0–2)
BETA-HYDROXYBUTYRATE: 2.26 MMOL/L (ref 0.02–0.27)
BILIRUB SERPL-MCNC: 0.6 MG/DL (ref 0–1.2)
BILIRUBIN URINE: ABNORMAL
BLOOD, URINE: NEGATIVE
BUN BLDV-MCNC: 22 MG/DL (ref 6–20)
CALCIUM SERPL-MCNC: 9.4 MG/DL (ref 8.6–10.2)
CHLORIDE BLD-SCNC: 93 MMOL/L (ref 98–107)
CHP ED QC CHECK: YES
CLARITY: CLEAR
CO2: 21 MMOL/L (ref 22–29)
COLOR: YELLOW
CREAT SERPL-MCNC: 0.8 MG/DL (ref 0.5–1)
EOSINOPHILS ABSOLUTE: 0.04 E9/L (ref 0.05–0.5)
EOSINOPHILS RELATIVE PERCENT: 0.4 % (ref 0–6)
EPITHELIAL CELLS, UA: ABNORMAL /HPF
GFR AFRICAN AMERICAN: >60
GFR NON-AFRICAN AMERICAN: >60 ML/MIN/1.73
GLUCOSE BLD-MCNC: 152 MG/DL
GLUCOSE BLD-MCNC: 394 MG/DL (ref 74–99)
GLUCOSE URINE: 500 MG/DL
HCG, URINE, POC: NEGATIVE
HCT VFR BLD CALC: 40.3 % (ref 34–48)
HEMOGLOBIN: 13.5 G/DL (ref 11.5–15.5)
IMMATURE GRANULOCYTES #: 0.03 E9/L
IMMATURE GRANULOCYTES %: 0.3 % (ref 0–5)
KETONES, URINE: 40 MG/DL
LACTIC ACID, SEPSIS: 1.3 MMOL/L (ref 0.5–1.9)
LEUKOCYTE ESTERASE, URINE: ABNORMAL
LIPASE: 8 U/L (ref 13–60)
LYMPHOCYTES ABSOLUTE: 3.58 E9/L (ref 1.5–4)
LYMPHOCYTES RELATIVE PERCENT: 39.7 % (ref 20–42)
Lab: NORMAL
MCH RBC QN AUTO: 28.4 PG (ref 26–35)
MCHC RBC AUTO-ENTMCNC: 33.5 % (ref 32–34.5)
MCV RBC AUTO: 84.7 FL (ref 80–99.9)
METER GLUCOSE: 152 MG/DL (ref 74–99)
METER GLUCOSE: 350 MG/DL (ref 74–99)
MONOCYTES ABSOLUTE: 0.4 E9/L (ref 0.1–0.95)
MONOCYTES RELATIVE PERCENT: 4.4 % (ref 2–12)
NEGATIVE QC PASS/FAIL: NORMAL
NEUTROPHILS ABSOLUTE: 4.91 E9/L (ref 1.8–7.3)
NEUTROPHILS RELATIVE PERCENT: 54.6 % (ref 43–80)
NITRITE, URINE: NEGATIVE
PDW BLD-RTO: 12.9 FL (ref 11.5–15)
PH UA: 6 (ref 5–9)
PH VENOUS: 7.36 (ref 7.35–7.45)
PHOSPHORUS: 3.2 MG/DL (ref 2.5–4.5)
PLATELET # BLD: 266 E9/L (ref 130–450)
PMV BLD AUTO: 10.6 FL (ref 7–12)
POSITIVE QC PASS/FAIL: NORMAL
POTASSIUM REFLEX MAGNESIUM: 4.2 MMOL/L (ref 3.5–5)
PROTEIN UA: ABNORMAL MG/DL
RBC # BLD: 4.76 E12/L (ref 3.5–5.5)
RBC UA: ABNORMAL /HPF (ref 0–2)
SODIUM BLD-SCNC: 127 MMOL/L (ref 132–146)
SPECIFIC GRAVITY UA: 1.02 (ref 1–1.03)
TOTAL PROTEIN: 7.7 G/DL (ref 6.4–8.3)
TRICHOMONAS: ABNORMAL /HPF
TROPONIN: <0.01 NG/ML (ref 0–0.03)
UROBILINOGEN, URINE: 0.2 E.U./DL
WBC # BLD: 9 E9/L (ref 4.5–11.5)
WBC UA: >20 /HPF (ref 0–5)

## 2019-11-30 PROCEDURE — 6370000000 HC RX 637 (ALT 250 FOR IP): Performed by: EMERGENCY MEDICINE

## 2019-11-30 PROCEDURE — 96372 THER/PROPH/DIAG INJ SC/IM: CPT

## 2019-11-30 PROCEDURE — 2580000003 HC RX 258: Performed by: EMERGENCY MEDICINE

## 2019-11-30 PROCEDURE — 36415 COLL VENOUS BLD VENIPUNCTURE: CPT

## 2019-11-30 PROCEDURE — 96365 THER/PROPH/DIAG IV INF INIT: CPT

## 2019-11-30 PROCEDURE — 82010 KETONE BODYS QUAN: CPT

## 2019-11-30 PROCEDURE — 6360000002 HC RX W HCPCS: Performed by: EMERGENCY MEDICINE

## 2019-11-30 PROCEDURE — 81001 URINALYSIS AUTO W/SCOPE: CPT

## 2019-11-30 PROCEDURE — 82800 BLOOD PH: CPT

## 2019-11-30 PROCEDURE — 85025 COMPLETE CBC W/AUTO DIFF WBC: CPT

## 2019-11-30 PROCEDURE — 84484 ASSAY OF TROPONIN QUANT: CPT

## 2019-11-30 PROCEDURE — 83605 ASSAY OF LACTIC ACID: CPT

## 2019-11-30 PROCEDURE — 71045 X-RAY EXAM CHEST 1 VIEW: CPT

## 2019-11-30 PROCEDURE — 84100 ASSAY OF PHOSPHORUS: CPT

## 2019-11-30 PROCEDURE — 87040 BLOOD CULTURE FOR BACTERIA: CPT

## 2019-11-30 PROCEDURE — 93005 ELECTROCARDIOGRAM TRACING: CPT | Performed by: EMERGENCY MEDICINE

## 2019-11-30 PROCEDURE — 83690 ASSAY OF LIPASE: CPT

## 2019-11-30 PROCEDURE — 82962 GLUCOSE BLOOD TEST: CPT

## 2019-11-30 PROCEDURE — 80053 COMPREHEN METABOLIC PANEL: CPT

## 2019-11-30 PROCEDURE — 99285 EMERGENCY DEPT VISIT HI MDM: CPT

## 2019-11-30 PROCEDURE — 87088 URINE BACTERIA CULTURE: CPT

## 2019-11-30 RX ORDER — CEPHALEXIN 500 MG/1
500 CAPSULE ORAL 2 TIMES DAILY
Qty: 20 CAPSULE | Refills: 0 | Status: SHIPPED | OUTPATIENT
Start: 2019-11-30 | End: 2019-12-10

## 2019-11-30 RX ORDER — 0.9 % SODIUM CHLORIDE 0.9 %
1000 INTRAVENOUS SOLUTION INTRAVENOUS ONCE
Status: COMPLETED | OUTPATIENT
Start: 2019-11-30 | End: 2019-11-30

## 2019-11-30 RX ORDER — DEXTROSE MONOHYDRATE 50 MG/ML
100 INJECTION, SOLUTION INTRAVENOUS PRN
Status: DISCONTINUED | OUTPATIENT
Start: 2019-11-30 | End: 2019-12-01 | Stop reason: HOSPADM

## 2019-11-30 RX ORDER — NICOTINE POLACRILEX 4 MG
15 LOZENGE BUCCAL PRN
Status: DISCONTINUED | OUTPATIENT
Start: 2019-11-30 | End: 2019-12-01 | Stop reason: HOSPADM

## 2019-11-30 RX ORDER — DEXTROSE MONOHYDRATE 25 G/50ML
12.5 INJECTION, SOLUTION INTRAVENOUS PRN
Status: DISCONTINUED | OUTPATIENT
Start: 2019-11-30 | End: 2019-12-01 | Stop reason: HOSPADM

## 2019-11-30 RX ORDER — ONDANSETRON 4 MG/1
4 TABLET, ORALLY DISINTEGRATING ORAL EVERY 8 HOURS PRN
Qty: 10 TABLET | Refills: 0 | Status: SHIPPED | OUTPATIENT
Start: 2019-11-30 | End: 2020-03-06

## 2019-11-30 RX ORDER — SODIUM CHLORIDE 0.9 % (FLUSH) 0.9 %
10 SYRINGE (ML) INJECTION PRN
Status: DISCONTINUED | OUTPATIENT
Start: 2019-11-30 | End: 2019-12-01 | Stop reason: HOSPADM

## 2019-11-30 RX ADMIN — CEFTRIAXONE 1 G: 1 INJECTION, POWDER, FOR SOLUTION INTRAMUSCULAR; INTRAVENOUS at 19:58

## 2019-11-30 RX ADMIN — SODIUM CHLORIDE 1000 ML: 9 INJECTION, SOLUTION INTRAVENOUS at 19:58

## 2019-11-30 RX ADMIN — INSULIN LISPRO 4 UNITS: 100 INJECTION, SOLUTION INTRAVENOUS; SUBCUTANEOUS at 20:09

## 2019-11-30 RX ADMIN — Medication 10 ML: at 18:31

## 2019-11-30 RX ADMIN — SODIUM CHLORIDE 1000 ML: 9 INJECTION, SOLUTION INTRAVENOUS at 18:31

## 2019-11-30 ASSESSMENT — ENCOUNTER SYMPTOMS
DIARRHEA: 0
ABDOMINAL PAIN: 1
CHEST TIGHTNESS: 0
NAUSEA: 1
SHORTNESS OF BREATH: 0
COUGH: 0
VOMITING: 1
COLOR CHANGE: 0
BACK PAIN: 0
BLOOD IN STOOL: 0

## 2019-12-01 LAB
EKG ATRIAL RATE: 96 BPM
EKG P AXIS: 72 DEGREES
EKG P-R INTERVAL: 148 MS
EKG Q-T INTERVAL: 314 MS
EKG QRS DURATION: 78 MS
EKG QTC CALCULATION (BAZETT): 396 MS
EKG R AXIS: 54 DEGREES
EKG T AXIS: 13 DEGREES
EKG VENTRICULAR RATE: 96 BPM

## 2019-12-01 PROCEDURE — 93010 ELECTROCARDIOGRAM REPORT: CPT | Performed by: INTERNAL MEDICINE

## 2019-12-03 LAB — URINE CULTURE, ROUTINE: NORMAL

## 2019-12-05 LAB — BLOOD CULTURE, ROUTINE: NORMAL

## 2019-12-30 ENCOUNTER — TELEPHONE (OUTPATIENT)
Dept: ENDOCRINOLOGY | Age: 24
End: 2019-12-30

## 2020-02-18 ENCOUNTER — HOSPITAL ENCOUNTER (OUTPATIENT)
Age: 25
Discharge: HOME | End: 2020-02-20
Payer: COMMERCIAL

## 2020-02-18 ENCOUNTER — OFFICE VISIT (OUTPATIENT)
Dept: OBGYN | Age: 25
End: 2020-02-18
Payer: COMMERCIAL

## 2020-02-18 VITALS
DIASTOLIC BLOOD PRESSURE: 72 MMHG | WEIGHT: 101 LBS | BODY MASS INDEX: 19.08 KG/M2 | SYSTOLIC BLOOD PRESSURE: 117 MMHG | HEART RATE: 116 BPM

## 2020-02-18 LAB
BACTERIA: ABNORMAL /HPF
BILIRUBIN URINE: NEGATIVE
BLOOD, URINE: ABNORMAL
CLARITY: ABNORMAL
CLUE CELLS: NORMAL
COLOR: YELLOW
EPITHELIAL CELLS, UA: ABNORMAL /HPF
GLUCOSE URINE: >=1000 MG/DL
KETONES, URINE: 15 MG/DL
LEUKOCYTE ESTERASE, URINE: ABNORMAL
NITRITE, URINE: NEGATIVE
PH UA: 6 (ref 5–9)
PROTEIN UA: ABNORMAL MG/DL
RBC UA: ABNORMAL /HPF (ref 0–2)
SOURCE WET PREP: NORMAL
SPECIFIC GRAVITY UA: 1.02 (ref 1–1.03)
TRICHOMONAS PREP: NORMAL
UROBILINOGEN, URINE: 0.2 E.U./DL
WBC UA: >20 /HPF (ref 0–5)
YEAST WET PREP: NORMAL

## 2020-02-18 PROCEDURE — 99213 OFFICE O/P EST LOW 20 MIN: CPT | Performed by: NURSE PRACTITIONER

## 2020-02-18 PROCEDURE — 81001 URINALYSIS AUTO W/SCOPE: CPT

## 2020-02-18 PROCEDURE — G8427 DOCREV CUR MEDS BY ELIG CLIN: HCPCS | Performed by: NURSE PRACTITIONER

## 2020-02-18 PROCEDURE — 87077 CULTURE AEROBIC IDENTIFY: CPT

## 2020-02-18 PROCEDURE — G8420 CALC BMI NORM PARAMETERS: HCPCS | Performed by: NURSE PRACTITIONER

## 2020-02-18 PROCEDURE — 87186 SC STD MICRODIL/AGAR DIL: CPT

## 2020-02-18 PROCEDURE — 87210 SMEAR WET MOUNT SALINE/INK: CPT

## 2020-02-18 PROCEDURE — G8484 FLU IMMUNIZE NO ADMIN: HCPCS | Performed by: NURSE PRACTITIONER

## 2020-02-18 PROCEDURE — 87088 URINE BACTERIA CULTURE: CPT

## 2020-02-18 PROCEDURE — 1036F TOBACCO NON-USER: CPT | Performed by: NURSE PRACTITIONER

## 2020-02-18 ASSESSMENT — ENCOUNTER SYMPTOMS
RESPIRATORY NEGATIVE: 1
GASTROINTESTINAL NEGATIVE: 1

## 2020-02-18 NOTE — PROGRESS NOTES
Subjective:      Patient ID: Hakan Dougherty is a 27 y.o. female. HPI: Patient being seen today for vaginal discharge and dysuria. Time unknown. Patient is a type one diabetic poorly controlled. Patient is under the care of endocrinologist.   LMP: 4/11/2019  Last pap smear: ASC-US  Denies pelvic pain vaginal bleeding. Review of Systems   Respiratory: Negative. Gastrointestinal: Negative. Genitourinary: Positive for dysuria and vaginal discharge. Negative for flank pain. Hematological: Negative. Psychiatric/Behavioral: Negative. Objective:   Physical Exam  Constitutional:       Appearance: Normal appearance. Genitourinary:     Vagina: Vaginal discharge present. Neurological:      Mental Status: She is alert. Psychiatric:         Behavior: Behavior normal.         Assessment:       Diagnosis Orders   1. Vaginal discharge     2. Dysuria             Plan:     Pelvic Rest - delay sexual relations untils sympoms resolve.   Use of condoms - protect agianst STD's and decrese occurance of   Performed speculum examination and wet prep, results pending. Also will obtain urinalysis with microscopic and urine culture, which is pending at this time. Recommended patient to follow up if she experiences any new or worsening complaints, otherwise will call with results and advise patient for further treatment. . She is to call if she has any problems or questions prior to her next visit. All questions and concerns addressed. Patient voices understanding of plan of care.         MIKE Daugherty - CNP

## 2020-02-21 LAB
ORGANISM: ABNORMAL
URINE CULTURE, ROUTINE: ABNORMAL

## 2020-02-21 RX ORDER — NITROFURANTOIN 25; 75 MG/1; MG/1
100 CAPSULE ORAL 2 TIMES DAILY
Qty: 14 CAPSULE | Refills: 0 | Status: SHIPPED | OUTPATIENT
Start: 2020-02-21 | End: 2020-02-28

## 2020-03-06 ENCOUNTER — APPOINTMENT (OUTPATIENT)
Dept: GENERAL RADIOLOGY | Age: 25
End: 2020-03-06
Payer: COMMERCIAL

## 2020-03-06 ENCOUNTER — HOSPITAL ENCOUNTER (OUTPATIENT)
Age: 25
Setting detail: OBSERVATION
Discharge: HOME | End: 2020-03-09
Attending: EMERGENCY MEDICINE | Admitting: INTERNAL MEDICINE
Payer: COMMERCIAL

## 2020-03-06 PROBLEM — R73.9 HYPERGLYCEMIA: Status: ACTIVE | Noted: 2020-03-06

## 2020-03-06 LAB
ALBUMIN SERPL-MCNC: 4 G/DL (ref 3.5–5.2)
ALP BLD-CCNC: 88 U/L (ref 35–104)
ALT SERPL-CCNC: 21 U/L (ref 0–32)
ANION GAP SERPL CALCULATED.3IONS-SCNC: 15 MMOL/L (ref 7–16)
AST SERPL-CCNC: 30 U/L (ref 0–31)
B.E.: -10.5 MMOL/L (ref -3–3)
BACTERIA: ABNORMAL /HPF
BASOPHILS ABSOLUTE: 0.03 E9/L (ref 0–0.2)
BASOPHILS RELATIVE PERCENT: 0.4 % (ref 0–2)
BETA-HYDROXYBUTYRATE: >4.5 MMOL/L (ref 0.02–0.27)
BILIRUB SERPL-MCNC: 0.4 MG/DL (ref 0–1.2)
BILIRUBIN URINE: NEGATIVE
BLOOD, URINE: NEGATIVE
BUN BLDV-MCNC: 16 MG/DL (ref 6–20)
CALCIUM SERPL-MCNC: 9.3 MG/DL (ref 8.6–10.2)
CHLORIDE BLD-SCNC: 97 MMOL/L (ref 98–107)
CLARITY: CLEAR
CO2: 19 MMOL/L (ref 22–29)
COHB: 0.3 % (ref 0–1.5)
COLOR: ABNORMAL
CREAT SERPL-MCNC: 0.7 MG/DL (ref 0.5–1)
CRITICAL: ABNORMAL
DATE ANALYZED: ABNORMAL
DATE OF COLLECTION: ABNORMAL
EOSINOPHILS ABSOLUTE: 0.02 E9/L (ref 0.05–0.5)
EOSINOPHILS RELATIVE PERCENT: 0.3 % (ref 0–6)
EPITHELIAL CELLS, UA: ABNORMAL /HPF
GFR AFRICAN AMERICAN: >60
GFR NON-AFRICAN AMERICAN: >60 ML/MIN/1.73
GLUCOSE BLD-MCNC: 598 MG/DL (ref 74–99)
GLUCOSE URINE: >=1000 MG/DL
HCG(URINE) PREGNANCY TEST: NEGATIVE
HCO3: 14.7 MMOL/L (ref 22–26)
HCT VFR BLD CALC: 41.3 % (ref 34–48)
HEMOGLOBIN: 13.9 G/DL (ref 11.5–15.5)
HHB: 3.7 % (ref 0–5)
IMMATURE GRANULOCYTES #: 0.02 E9/L
IMMATURE GRANULOCYTES %: 0.3 % (ref 0–5)
KETONES, URINE: 40 MG/DL
LAB: ABNORMAL
LEUKOCYTE ESTERASE, URINE: NEGATIVE
LIPASE: 8 U/L (ref 13–60)
LYMPHOCYTES ABSOLUTE: 2.77 E9/L (ref 1.5–4)
LYMPHOCYTES RELATIVE PERCENT: 41.4 % (ref 20–42)
Lab: ABNORMAL
MCH RBC QN AUTO: 28.5 PG (ref 26–35)
MCHC RBC AUTO-ENTMCNC: 33.7 % (ref 32–34.5)
MCV RBC AUTO: 84.8 FL (ref 80–99.9)
METER GLUCOSE: 240 MG/DL (ref 74–99)
METHB: 0.2 % (ref 0–1.5)
MODE: ABNORMAL
MONOCYTES ABSOLUTE: 0.28 E9/L (ref 0.1–0.95)
MONOCYTES RELATIVE PERCENT: 4.2 % (ref 2–12)
NEUTROPHILS ABSOLUTE: 3.57 E9/L (ref 1.8–7.3)
NEUTROPHILS RELATIVE PERCENT: 53.4 % (ref 43–80)
NITRITE, URINE: NEGATIVE
O2 CONTENT: 17.6 ML/DL
O2 SATURATION: 96.3 % (ref 92–98.5)
O2HB: 95.8 % (ref 94–97)
OPERATOR ID: 754
PATIENT TEMP: 37 C
PCO2: 31 MMHG (ref 35–45)
PDW BLD-RTO: 12.4 FL (ref 11.5–15)
PH BLOOD GAS: 7.29 (ref 7.35–7.45)
PH UA: 6 (ref 5–9)
PLATELET # BLD: 300 E9/L (ref 130–450)
PMV BLD AUTO: 10.8 FL (ref 7–12)
PO2: 94.1 MMHG (ref 75–100)
POTASSIUM SERPL-SCNC: 4.3 MMOL/L (ref 3.5–5)
PROTEIN UA: NEGATIVE MG/DL
RBC # BLD: 4.87 E12/L (ref 3.5–5.5)
RBC UA: ABNORMAL /HPF (ref 0–2)
SODIUM BLD-SCNC: 131 MMOL/L (ref 132–146)
SOURCE, BLOOD GAS: ABNORMAL
SPECIFIC GRAVITY UA: 1.01 (ref 1–1.03)
THB: 13 G/DL (ref 11.5–16.5)
TIME ANALYZED: 2057
TOTAL PROTEIN: 7.3 G/DL (ref 6.4–8.3)
UROBILINOGEN, URINE: 0.2 E.U./DL
WBC # BLD: 6.7 E9/L (ref 4.5–11.5)
WBC UA: ABNORMAL /HPF (ref 0–5)

## 2020-03-06 PROCEDURE — 87186 SC STD MICRODIL/AGAR DIL: CPT

## 2020-03-06 PROCEDURE — 82962 GLUCOSE BLOOD TEST: CPT

## 2020-03-06 PROCEDURE — 6360000002 HC RX W HCPCS: Performed by: EMERGENCY MEDICINE

## 2020-03-06 PROCEDURE — 2580000003 HC RX 258: Performed by: PHYSICIAN ASSISTANT

## 2020-03-06 PROCEDURE — 85025 COMPLETE CBC W/AUTO DIFF WBC: CPT

## 2020-03-06 PROCEDURE — G0378 HOSPITAL OBSERVATION PER HR: HCPCS

## 2020-03-06 PROCEDURE — 2580000003 HC RX 258: Performed by: RADIOLOGY

## 2020-03-06 PROCEDURE — 83690 ASSAY OF LIPASE: CPT

## 2020-03-06 PROCEDURE — 82805 BLOOD GASES W/O2 SATURATION: CPT

## 2020-03-06 PROCEDURE — 96375 TX/PRO/DX INJ NEW DRUG ADDON: CPT

## 2020-03-06 PROCEDURE — 6370000000 HC RX 637 (ALT 250 FOR IP): Performed by: EMERGENCY MEDICINE

## 2020-03-06 PROCEDURE — 99285 EMERGENCY DEPT VISIT HI MDM: CPT

## 2020-03-06 PROCEDURE — 6360000004 HC RX CONTRAST MEDICATION: Performed by: RADIOLOGY

## 2020-03-06 PROCEDURE — 81001 URINALYSIS AUTO W/SCOPE: CPT

## 2020-03-06 PROCEDURE — 87077 CULTURE AEROBIC IDENTIFY: CPT

## 2020-03-06 PROCEDURE — 71045 X-RAY EXAM CHEST 1 VIEW: CPT

## 2020-03-06 PROCEDURE — 82010 KETONE BODYS QUAN: CPT

## 2020-03-06 PROCEDURE — 80053 COMPREHEN METABOLIC PANEL: CPT

## 2020-03-06 PROCEDURE — 96374 THER/PROPH/DIAG INJ IV PUSH: CPT

## 2020-03-06 PROCEDURE — 87088 URINE BACTERIA CULTURE: CPT

## 2020-03-06 PROCEDURE — 2580000003 HC RX 258: Performed by: EMERGENCY MEDICINE

## 2020-03-06 PROCEDURE — 74177 CT ABD & PELVIS W/CONTRAST: CPT

## 2020-03-06 PROCEDURE — 93005 ELECTROCARDIOGRAM TRACING: CPT | Performed by: PHYSICIAN ASSISTANT

## 2020-03-06 PROCEDURE — 2500000003 HC RX 250 WO HCPCS: Performed by: EMERGENCY MEDICINE

## 2020-03-06 PROCEDURE — 81025 URINE PREGNANCY TEST: CPT

## 2020-03-06 PROCEDURE — 96361 HYDRATE IV INFUSION ADD-ON: CPT

## 2020-03-06 RX ORDER — DEXTROSE MONOHYDRATE 25 G/50ML
12.5 INJECTION, SOLUTION INTRAVENOUS PRN
Status: DISCONTINUED | OUTPATIENT
Start: 2020-03-06 | End: 2020-03-09 | Stop reason: HOSPADM

## 2020-03-06 RX ORDER — KETOROLAC TROMETHAMINE 30 MG/ML
15 INJECTION, SOLUTION INTRAMUSCULAR; INTRAVENOUS ONCE
Status: COMPLETED | OUTPATIENT
Start: 2020-03-06 | End: 2020-03-06

## 2020-03-06 RX ORDER — SODIUM CHLORIDE 9 MG/ML
INJECTION, SOLUTION INTRAVENOUS ONCE
Status: COMPLETED | OUTPATIENT
Start: 2020-03-06 | End: 2020-03-06

## 2020-03-06 RX ORDER — NICOTINE POLACRILEX 4 MG
15 LOZENGE BUCCAL PRN
Status: DISCONTINUED | OUTPATIENT
Start: 2020-03-06 | End: 2020-03-09 | Stop reason: HOSPADM

## 2020-03-06 RX ORDER — SODIUM CHLORIDE 0.9 % (FLUSH) 0.9 %
10 SYRINGE (ML) INJECTION PRN
Status: DISCONTINUED | OUTPATIENT
Start: 2020-03-06 | End: 2020-03-09 | Stop reason: HOSPADM

## 2020-03-06 RX ORDER — CEFTRIAXONE 1 G/1
INJECTION, POWDER, FOR SOLUTION INTRAMUSCULAR; INTRAVENOUS
Status: DISCONTINUED
Start: 2020-03-06 | End: 2020-03-07

## 2020-03-06 RX ORDER — DEXTROSE MONOHYDRATE 50 MG/ML
100 INJECTION, SOLUTION INTRAVENOUS PRN
Status: DISCONTINUED | OUTPATIENT
Start: 2020-03-06 | End: 2020-03-09 | Stop reason: HOSPADM

## 2020-03-06 RX ORDER — ONDANSETRON 2 MG/ML
4 INJECTION INTRAMUSCULAR; INTRAVENOUS
Status: DISPENSED | OUTPATIENT
Start: 2020-03-06 | End: 2020-03-06

## 2020-03-06 RX ADMIN — KETOROLAC TROMETHAMINE 15 MG: 30 INJECTION, SOLUTION INTRAMUSCULAR at 19:00

## 2020-03-06 RX ADMIN — FAMOTIDINE 20 MG: 10 INJECTION INTRAVENOUS at 19:00

## 2020-03-06 RX ADMIN — IOPAMIDOL 110 ML: 755 INJECTION, SOLUTION INTRAVENOUS at 23:39

## 2020-03-06 RX ADMIN — INSULIN HUMAN 5 UNITS: 100 INJECTION, SOLUTION PARENTERAL at 21:09

## 2020-03-06 RX ADMIN — SODIUM CHLORIDE: 9 INJECTION, SOLUTION INTRAVENOUS at 18:59

## 2020-03-06 RX ADMIN — Medication 10 ML: at 23:39

## 2020-03-06 RX ADMIN — SODIUM CHLORIDE 1000 ML: 9 INJECTION, SOLUTION INTRAVENOUS at 22:36

## 2020-03-06 ASSESSMENT — PAIN DESCRIPTION - DESCRIPTORS: DESCRIPTORS: CRAMPING

## 2020-03-06 ASSESSMENT — PAIN DESCRIPTION - ONSET: ONSET: SUDDEN

## 2020-03-06 ASSESSMENT — ENCOUNTER SYMPTOMS
VOMITING: 0
NAUSEA: 1
COLOR CHANGE: 0
BACK PAIN: 0
CHEST TIGHTNESS: 0
DIARRHEA: 0
SHORTNESS OF BREATH: 0
ABDOMINAL PAIN: 1
COUGH: 1
BLOOD IN STOOL: 0

## 2020-03-06 ASSESSMENT — PAIN DESCRIPTION - LOCATION: LOCATION: FLANK

## 2020-03-06 ASSESSMENT — PAIN DESCRIPTION - ORIENTATION: ORIENTATION: RIGHT;LEFT

## 2020-03-06 ASSESSMENT — PAIN - FUNCTIONAL ASSESSMENT: PAIN_FUNCTIONAL_ASSESSMENT: PREVENTS OR INTERFERES SOME ACTIVE ACTIVITIES AND ADLS

## 2020-03-06 ASSESSMENT — PAIN DESCRIPTION - PAIN TYPE: TYPE: ACUTE PAIN

## 2020-03-06 ASSESSMENT — PAIN SCALES - GENERAL
PAINLEVEL_OUTOF10: 10
PAINLEVEL_OUTOF10: 9

## 2020-03-06 NOTE — ED PROVIDER NOTES
is not ill-appearing, toxic-appearing or diaphoretic. HENT:      Head: Normocephalic and atraumatic. Eyes:      General: No visual field deficit or scleral icterus. Pupils: Pupils are equal, round, and reactive to light. Neck:      Musculoskeletal: Normal range of motion and neck supple. Vascular: No JVD. Cardiovascular:      Rate and Rhythm: Normal rate and regular rhythm. Heart sounds: Normal heart sounds, S1 normal and S2 normal. No murmur. Pulmonary:      Effort: Pulmonary effort is normal. No accessory muscle usage or respiratory distress. Breath sounds: Normal breath sounds. No wheezing, rhonchi or rales. Abdominal:      General: Bowel sounds are normal. There is no distension. Palpations: Abdomen is soft. Tenderness: There is abdominal tenderness (minimal, epigastric). There is no right CVA tenderness, left CVA tenderness or guarding. Musculoskeletal: Normal range of motion. Lymphadenopathy:      Cervical: No cervical adenopathy. Skin:     General: Skin is warm and dry. Coloration: Skin is not pale. Findings: No rash. Neurological:      Mental Status: She is alert and oriented to person, place, and time. Cranial Nerves: Cranial nerves are intact. No cranial nerve deficit. Sensory: Sensation is intact. No sensory deficit. Motor: Motor function is intact. No weakness. Coordination: Coordination is intact. Procedures   Venous Access Procedure Note  Indication: Need blood for laboratory evaluation and nursing staff unable to obtain access    Procedure: The patient was placed in the appropriate position and the skin over the puncture site was prepped with alcohol. Intravenous access was obtained in right basilic vein under direct US guidance and the site was secured appropriately. Procedure was preformed under live ultrasound for guidance. The patient tolerated the procedure well.     Complications: None        ED Course as of Mar 07 1505   Fri Mar 06, 2020   [id number] Spoke with Dr. Elizabeth Moreno, accepted for admission. [RU]      ED Course User Index  [RU] Rosa Robbins DO      --------------------------------------------- PAST HISTORY ---------------------------------------------  Past Medical History:  has a past medical history of Bleeding at insertion site, Common femoral artery injury, right, initial encounter, and DM type 1 (diabetes mellitus, type 1) [facility] Past Surgical History:  has a past surgical history that includes Abdomen surgery (N/A, 5/9/2019). Social History:  reports that she has never smoked. She has never used smokeless tobacco. She reports that she does not drink alcohol or use drugs. Family History: family history includes Diabetes in her maternal grandmother and paternal grandmother; Stroke in an other family member. The patients home medications have been reviewed. Allergies: Patient has no known allergies.     -------------------------------------------------- RESULTS -------------------------------------------------    Lab  Results for orders placed or performed during the hospital encounter of 03/06/20   Culture, Urine   Result Value Ref Range    Organism Gram positive cocci (A)     Urine Culture, Routine       >100,000 CFU/ml  Identification and sensitivity to follow     Chlamydia trachomatis DNA, Urine   Result Value Ref Range    Source Urine    Comprehensive Metabolic Panel   Result Value Ref Range    Sodium 131 (L) 132 - 146 mmol/L    Potassium 4.3 3.5 - 5.0 mmol/L    Chloride 97 (L) 98 - 107 mmol/L    CO2 19 (L) 22 - 29 mmol/L    Anion Gap 15 7 - 16 mmol/L    Glucose 598 (HH) 74 - 99 mg/dL    BUN 16 6 - 20 mg/dL    CREATININE 0.7 0.5 - 1.0 mg/dL    GFR Non-African American >60 >=60 mL/min/1.73    GFR African American >60     Calcium 9.3 8.6 - 10.2 mg/dL    Total Protein 7.3 6.4 - 8.3 g/dL    Alb 4.0 3.5 - 5.2 g/dL    Total Bilirubin 0.4 0.0 - 1.2 mg/dL    Alkaline Phosphatase 88 35 - 104 U/L ALT 21 0 - 32 U/L    AST 30 0 - 31 U/L   CBC Auto Differential   Result Value Ref Range    WBC 6.7 4.5 - 11.5 E9/L    RBC 4.87 3.50 - 5.50 E12/L    Hemoglobin 13.9 11.5 - 15.5 g/dL    Hematocrit 41.3 34.0 - 48.0 %    MCV 84.8 80.0 - 99.9 fL    MCH 28.5 26.0 - 35.0 pg    MCHC 33.7 32.0 - 34.5 %    RDW 12.4 11.5 - 15.0 fL    Platelets 927 495 - 212 E9/L    MPV 10.8 7.0 - 12.0 fL    Neutrophils % 53.4 43.0 - 80.0 %    Immature Granulocytes % 0.3 0.0 - 5.0 %    Lymphocytes % 41.4 20.0 - 42.0 %    Monocytes % 4.2 2.0 - 12.0 %    Eosinophils % 0.3 0.0 - 6.0 %    Basophils % 0.4 0.0 - 2.0 %    Neutrophils Absolute 3.57 1.80 - 7.30 E9/L    Immature Granulocytes # 0.02 E9/L    Lymphocytes Absolute 2.77 1.50 - 4.00 E9/L    Monocytes Absolute 0.28 0.10 - 0.95 E9/L    Eosinophils Absolute 0.02 (L) 0.05 - 0.50 E9/L    Basophils Absolute 0.03 0.00 - 0.20 E9/L   Lipase   Result Value Ref Range    Lipase 8 (L) 13 - 60 U/L   Beta-Hydroxybutyrate   Result Value Ref Range    Beta-Hydroxybutyrate >4.50 (H) 0.02 - 0.27 mmol/L   Urinalysis with Microscopic   Result Value Ref Range    Color, UA Straw Straw/Yellow    Clarity, UA Clear Clear    Glucose, Ur >=1000 (A) Negative mg/dL    Bilirubin Urine Negative Negative    Ketones, Urine 40 (A) Negative mg/dL    Specific Gravity, UA 1.010 1.005 - 1.030    Blood, Urine Negative Negative    pH, UA 6.0 5.0 - 9.0    Protein, UA Negative Negative mg/dL    Urobilinogen, Urine 0.2 <2.0 E.U./dL    Nitrite, Urine Negative Negative    Leukocyte Esterase, Urine Negative Negative    WBC, UA 10-20 (A) 0 - 5 /HPF    RBC, UA 0-1 0 - 2 /HPF    Epithelial Cells, UA MODERATE /HPF    Bacteria, UA MODERATE (A) None Seen /HPF   Pregnancy, Urine   Result Value Ref Range    HCG(Urine) Pregnancy Test NEGATIVE NEGATIVE   Blood Gas, Arterial   Result Value Ref Range    Date Analyzed [id number]     Time Analyzed 2057     Source: Blood Arterial     pH, Blood Gas 7.294 (L) 7.350 - 7.450    PCO2 31.0 (L) 35.0 - 45.0 mmHg PO2 94.1 75.0 - 100.0 mmHg    HCO3 14.7 (L) 22.0 - 26.0 mmol/L    B.E. -10.5 (L) -3.0 - 3.0 mmol/L    O2 Sat 96.3 92.0 - 98.5 %    O2Hb 95.8 94.0 - 97.0 %    COHb 0.3 0.0 - 1.5 %    MetHb 0.2 0.0 - 1.5 %    O2 Content 17.6 mL/dL    HHb 3.7 0.0 - 5.0 %    tHb (est) 13.0 11.5 - 16.5 g/dL    Mode RA     Date Of Collection      Time Collected      Pt Temp 37.0 C     ID [id number]     Lab [id number]     Critical(s) Notified .  No Critical Values    CBC   Result Value Ref Range    WBC 7.6 4.5 - 11.5 E9/L    RBC 4.07 3.50 - 5.50 E12/L    Hemoglobin 11.6 11.5 - 15.5 g/dL    Hematocrit 34.2 34.0 - 48.0 %    MCV 84.0 80.0 - 99.9 fL    MCH 28.5 26.0 - 35.0 pg    MCHC 33.9 32.0 - 34.5 %    RDW 12.4 11.5 - 15.0 fL    Platelets 530 828 - 373 E9/L    MPV 10.7 7.0 - 12.0 fL   Basic metabolic panel   Result Value Ref Range    Sodium 137 132 - 146 mmol/L    Potassium 3.2 (L) 3.5 - 5.0 mmol/L    Chloride 107 98 - 107 mmol/L    CO2 17 (L) 22 - 29 mmol/L    Anion Gap 13 7 - 16 mmol/L    Glucose 109 (H) 74 - 99 mg/dL    BUN 9 6 - 20 mg/dL    CREATININE 0.5 0.5 - 1.0 mg/dL    GFR Non-African American >60 >=60 mL/min/1.73    GFR African American >60     Calcium 8.4 (L) 8.6 - 10.2 mg/dL   Magnesium   Result Value Ref Range    Magnesium 1.5 (L) 1.6 - 2.6 mg/dL   Phosphorus   Result Value Ref Range    Phosphorus 3.9 2.5 - 4.5 mg/dL   Lactic acid, plasma   Result Value Ref Range    Lactic Acid 1.3 0.5 - 2.2 mmol/L   POCT Glucose   Result Value Ref Range    Meter Glucose 240 (H) 74 - 99 mg/dL   POCT Glucose   Result Value Ref Range    Meter Glucose 189 (H) 74 - 99 mg/dL   POCT Glucose   Result Value Ref Range    Meter Glucose 164 (H) 74 - 99 mg/dL   POCT Glucose   Result Value Ref Range    Meter Glucose 118 (H) 74 - 99 mg/dL   POCT Glucose   Result Value Ref Range    Meter Glucose 75 74 - 99 mg/dL   POCT Glucose   Result Value Ref Range    Meter Glucose 119 (H) 74 - 99 mg/dL   POCT Glucose   Result Value Ref Range    Meter Glucose 95 74 - 99 mg/dL   EKG 12 Lead   Result Value Ref Range    Ventricular Rate 119 BPM    Atrial Rate 119 BPM    P-R Interval 132 ms    QRS Duration 66 ms    Q-T Interval 312 ms    QTc Calculation (Bazett) 438 ms    P Axis 80 degrees    R Axis 61 degrees    T Axis 45 degrees       Radiology  CT ABDOMEN PELVIS W IV CONTRAST Additional Contrast? None   Final Result   No acute findings. This report has been electronically signed by Christiano Barnhart MD.      XR CHEST PORTABLE   Final Result   No acute disease. EKG:  This EKG is signed and interpreted by me. Rate: 119  Rhythm: Sinus  Interpretation: Sinus tachycardia, no acute ST changes  Comparison: stable as compared to patient's most recent EKG    ------------------------- NURSING NOTES AND VITALS REVIEWED ---------------------------  Date / Time Roomed:  3/6/2020  5:30 PM  ED Bed Assignment:  25/25    The nursing notes within the ED encounter and vital signs as below have been reviewed. Patient Vitals for the past 24 hrs:   BP Temp Temp src Pulse Resp SpO2 Height Weight   03/06/20 1723 119/88 97.6 °F (36.4 °C) Oral 115 16 99 % 5' 1\" (1.549 m) 101 lb (45.8 kg)   03/06/20 1645 -- -- -- 142 -- 96 % -- --       Oxygen Saturation Interpretation: Normal    --------------------------------------- ED Clinical Course/MDM --------------------------------------    Patient is a 21-year-old female with a history of type 1 diabetes with questionable compliance history presenting for reported hyperglycemia, blurry vision abdominal pain and nausea. Also endorsed diarrhea, did not appear to be consistent with C. difficile colitis at this time. Work-up performed, not felt to be in acute diabetic ketoacidosis at this time his BMP with a bicarb of 19, gap of 15. Did have a significantly elevated beta hydroxybutyrate, however, ABG with only mild acidemia. Given 5 units of IV insulin with improvement in her glucose along with fluids.   Urinalysis was suggestive of urinary tract infection, Rocephin ordered. CT scan of the abdomen pelvis requested by medicine due to concern for pyelonephritis. Patient made hemodynamically stable while here in the department, is afebrile. Subsequently admitted for further work-up and evaluation management for her elevated blood sugar.      --------------------------------- ADDITIONAL PROVIDER NOTES ---------------------------------  Counseling:  I have spoken with the patient and discussed todays results, in addition to providing specific details for the plan of care and counseling regarding the diagnosis and prognosis. Their questions are answered at this time and they are agreeable with the plan of admission. This patient has remained hemodynamically stable during their ED course. Diagnosis:  1. Hyperglycemia    2. Acute cystitis without hematuria        Disposition:  Patient's disposition: Admit to telemetry  Patient's condition is stable.        Katie Godoy DO  Resident  03/07/20 [id number]

## 2020-03-06 NOTE — ED NOTES
Bed: 25  Expected date:   Expected time:   Means of arrival:   Comments:  triage     Silvio Negrete.  Clark Campoverde RN  03/06/20 0417

## 2020-03-07 ENCOUNTER — APPOINTMENT (OUTPATIENT)
Dept: CT IMAGING | Age: 25
End: 2020-03-07
Payer: COMMERCIAL

## 2020-03-07 PROBLEM — N39.0 UTI (URINARY TRACT INFECTION): Status: ACTIVE | Noted: 2020-03-07

## 2020-03-07 LAB
ANION GAP SERPL CALCULATED.3IONS-SCNC: 12 MMOL/L (ref 7–16)
ANION GAP SERPL CALCULATED.3IONS-SCNC: 13 MMOL/L (ref 7–16)
BETA-HYDROXYBUTYRATE: 1.38 MMOL/L (ref 0.02–0.27)
BUN BLDV-MCNC: 7 MG/DL (ref 6–20)
BUN BLDV-MCNC: 9 MG/DL (ref 6–20)
CALCIUM SERPL-MCNC: 8 MG/DL (ref 8.6–10.2)
CALCIUM SERPL-MCNC: 8.4 MG/DL (ref 8.6–10.2)
CHLORIDE BLD-SCNC: 106 MMOL/L (ref 98–107)
CHLORIDE BLD-SCNC: 107 MMOL/L (ref 98–107)
CO2: 17 MMOL/L (ref 22–29)
CO2: 18 MMOL/L (ref 22–29)
CREAT SERPL-MCNC: 0.5 MG/DL (ref 0.5–1)
CREAT SERPL-MCNC: 0.5 MG/DL (ref 0.5–1)
EKG ATRIAL RATE: 119 BPM
EKG P AXIS: 80 DEGREES
EKG P-R INTERVAL: 132 MS
EKG Q-T INTERVAL: 312 MS
EKG QRS DURATION: 66 MS
EKG QTC CALCULATION (BAZETT): 438 MS
EKG R AXIS: 61 DEGREES
EKG T AXIS: 45 DEGREES
EKG VENTRICULAR RATE: 119 BPM
GFR AFRICAN AMERICAN: >60
GFR AFRICAN AMERICAN: >60
GFR NON-AFRICAN AMERICAN: >60 ML/MIN/1.73
GFR NON-AFRICAN AMERICAN: >60 ML/MIN/1.73
GLUCOSE BLD-MCNC: 109 MG/DL (ref 74–99)
GLUCOSE BLD-MCNC: 132 MG/DL (ref 74–99)
HCT VFR BLD CALC: 34.2 % (ref 34–48)
HEMOGLOBIN: 11.6 G/DL (ref 11.5–15.5)
LACTIC ACID: 1.3 MMOL/L (ref 0.5–2.2)
MAGNESIUM: 1.5 MG/DL (ref 1.6–2.6)
MAGNESIUM: 2.1 MG/DL (ref 1.6–2.6)
MAGNESIUM: 2.2 MG/DL (ref 1.6–2.6)
MCH RBC QN AUTO: 28.5 PG (ref 26–35)
MCHC RBC AUTO-ENTMCNC: 33.9 % (ref 32–34.5)
MCV RBC AUTO: 84 FL (ref 80–99.9)
METER GLUCOSE: 118 MG/DL (ref 74–99)
METER GLUCOSE: 119 MG/DL (ref 74–99)
METER GLUCOSE: 164 MG/DL (ref 74–99)
METER GLUCOSE: 189 MG/DL (ref 74–99)
METER GLUCOSE: 336 MG/DL (ref 74–99)
METER GLUCOSE: 374 MG/DL (ref 74–99)
METER GLUCOSE: 75 MG/DL (ref 74–99)
METER GLUCOSE: 95 MG/DL (ref 74–99)
PDW BLD-RTO: 12.4 FL (ref 11.5–15)
PHOSPHORUS: 3.8 MG/DL (ref 2.5–4.5)
PHOSPHORUS: 3.9 MG/DL (ref 2.5–4.5)
PLATELET # BLD: 239 E9/L (ref 130–450)
PMV BLD AUTO: 10.7 FL (ref 7–12)
POTASSIUM SERPL-SCNC: 3.2 MMOL/L (ref 3.5–5)
POTASSIUM SERPL-SCNC: 4.2 MMOL/L (ref 3.5–5)
RBC # BLD: 4.07 E12/L (ref 3.5–5.5)
REASON FOR REJECTION: NORMAL
REJECTED TEST: NORMAL
SODIUM BLD-SCNC: 136 MMOL/L (ref 132–146)
SODIUM BLD-SCNC: 137 MMOL/L (ref 132–146)
WBC # BLD: 7.6 E9/L (ref 4.5–11.5)

## 2020-03-07 PROCEDURE — 83605 ASSAY OF LACTIC ACID: CPT

## 2020-03-07 PROCEDURE — 82010 KETONE BODYS QUAN: CPT

## 2020-03-07 PROCEDURE — 2580000003 HC RX 258: Performed by: INTERNAL MEDICINE

## 2020-03-07 PROCEDURE — APPSS30 APP SPLIT SHARED TIME 16-30 MINUTES: Performed by: NURSE PRACTITIONER

## 2020-03-07 PROCEDURE — G0378 HOSPITAL OBSERVATION PER HR: HCPCS

## 2020-03-07 PROCEDURE — 6360000002 HC RX W HCPCS

## 2020-03-07 PROCEDURE — 96375 TX/PRO/DX INJ NEW DRUG ADDON: CPT

## 2020-03-07 PROCEDURE — 96361 HYDRATE IV INFUSION ADD-ON: CPT

## 2020-03-07 PROCEDURE — 6370000000 HC RX 637 (ALT 250 FOR IP): Performed by: INTERNAL MEDICINE

## 2020-03-07 PROCEDURE — 6370000000 HC RX 637 (ALT 250 FOR IP): Performed by: NURSE PRACTITIONER

## 2020-03-07 PROCEDURE — 2580000003 HC RX 258: Performed by: EMERGENCY MEDICINE

## 2020-03-07 PROCEDURE — 74177 CT ABD & PELVIS W/CONTRAST: CPT

## 2020-03-07 PROCEDURE — 36415 COLL VENOUS BLD VENIPUNCTURE: CPT

## 2020-03-07 PROCEDURE — 2580000003 HC RX 258: Performed by: NURSE PRACTITIONER

## 2020-03-07 PROCEDURE — 96376 TX/PRO/DX INJ SAME DRUG ADON: CPT

## 2020-03-07 PROCEDURE — 87491 CHLMYD TRACH DNA AMP PROBE: CPT

## 2020-03-07 PROCEDURE — 2580000003 HC RX 258: Performed by: RADIOLOGY

## 2020-03-07 PROCEDURE — 2500000003 HC RX 250 WO HCPCS: Performed by: INTERNAL MEDICINE

## 2020-03-07 PROCEDURE — 84100 ASSAY OF PHOSPHORUS: CPT

## 2020-03-07 PROCEDURE — 96372 THER/PROPH/DIAG INJ SC/IM: CPT

## 2020-03-07 PROCEDURE — 83735 ASSAY OF MAGNESIUM: CPT

## 2020-03-07 PROCEDURE — 96367 TX/PROPH/DG ADDL SEQ IV INF: CPT

## 2020-03-07 PROCEDURE — 6360000002 HC RX W HCPCS: Performed by: INTERNAL MEDICINE

## 2020-03-07 PROCEDURE — 85027 COMPLETE CBC AUTOMATED: CPT

## 2020-03-07 PROCEDURE — 96365 THER/PROPH/DIAG IV INF INIT: CPT

## 2020-03-07 PROCEDURE — 96366 THER/PROPH/DIAG IV INF ADDON: CPT

## 2020-03-07 PROCEDURE — 82962 GLUCOSE BLOOD TEST: CPT

## 2020-03-07 PROCEDURE — 80048 BASIC METABOLIC PNL TOTAL CA: CPT

## 2020-03-07 RX ORDER — INSULIN GLARGINE 100 [IU]/ML
10 INJECTION, SOLUTION SUBCUTANEOUS NIGHTLY
Status: DISCONTINUED | OUTPATIENT
Start: 2020-03-07 | End: 2020-03-07

## 2020-03-07 RX ORDER — MAGNESIUM SULFATE IN WATER 40 MG/ML
2 INJECTION, SOLUTION INTRAVENOUS ONCE
Status: COMPLETED | OUTPATIENT
Start: 2020-03-07 | End: 2020-03-07

## 2020-03-07 RX ORDER — POTASSIUM CHLORIDE 20 MEQ/1
40 TABLET, EXTENDED RELEASE ORAL ONCE
Status: COMPLETED | OUTPATIENT
Start: 2020-03-07 | End: 2020-03-07

## 2020-03-07 RX ORDER — SODIUM CHLORIDE 0.9 % (FLUSH) 0.9 %
10 SYRINGE (ML) INJECTION 2 TIMES DAILY
Status: DISCONTINUED | OUTPATIENT
Start: 2020-03-07 | End: 2020-03-09 | Stop reason: HOSPADM

## 2020-03-07 RX ORDER — NAFCILLIN 1 G/50ML
1 INJECTION, SOLUTION INTRAVENOUS EVERY 4 HOURS
Status: DISCONTINUED | OUTPATIENT
Start: 2020-03-07 | End: 2020-03-07 | Stop reason: CLARIF

## 2020-03-07 RX ORDER — MORPHINE SULFATE 2 MG/ML
INJECTION, SOLUTION INTRAMUSCULAR; INTRAVENOUS
Status: COMPLETED
Start: 2020-03-07 | End: 2020-03-07

## 2020-03-07 RX ORDER — DEXTROSE AND SODIUM CHLORIDE 5; .9 G/100ML; G/100ML
INJECTION, SOLUTION INTRAVENOUS CONTINUOUS
Status: DISCONTINUED | OUTPATIENT
Start: 2020-03-07 | End: 2020-03-08

## 2020-03-07 RX ORDER — MORPHINE SULFATE 2 MG/ML
2 INJECTION, SOLUTION INTRAMUSCULAR; INTRAVENOUS EVERY 4 HOURS PRN
Status: DISCONTINUED | OUTPATIENT
Start: 2020-03-07 | End: 2020-03-09 | Stop reason: HOSPADM

## 2020-03-07 RX ORDER — SODIUM CHLORIDE 9 MG/ML
INJECTION, SOLUTION INTRAVENOUS CONTINUOUS
Status: DISCONTINUED | OUTPATIENT
Start: 2020-03-07 | End: 2020-03-08

## 2020-03-07 RX ORDER — INSULIN GLARGINE 100 [IU]/ML
10 INJECTION, SOLUTION SUBCUTANEOUS NIGHTLY
Status: DISCONTINUED | OUTPATIENT
Start: 2020-03-07 | End: 2020-03-08

## 2020-03-07 RX ADMIN — DEXTROSE AND SODIUM CHLORIDE: 5; 900 INJECTION, SOLUTION INTRAVENOUS at 14:16

## 2020-03-07 RX ADMIN — MORPHINE SULFATE 2 MG: 2 INJECTION, SOLUTION INTRAMUSCULAR; INTRAVENOUS at 09:56

## 2020-03-07 RX ADMIN — INSULIN GLARGINE 10 UNITS: 100 INJECTION, SOLUTION SUBCUTANEOUS at 20:05

## 2020-03-07 RX ADMIN — POTASSIUM CHLORIDE 40 MEQ: 20 TABLET, EXTENDED RELEASE ORAL at 16:40

## 2020-03-07 RX ADMIN — WATER 1 G: 1 INJECTION INTRAMUSCULAR; INTRAVENOUS; SUBCUTANEOUS at 06:46

## 2020-03-07 RX ADMIN — SODIUM CHLORIDE, PRESERVATIVE FREE 10 ML: 5 INJECTION INTRAVENOUS at 06:46

## 2020-03-07 RX ADMIN — MORPHINE SULFATE 2 MG: 2 INJECTION, SOLUTION INTRAMUSCULAR; INTRAVENOUS at 14:15

## 2020-03-07 RX ADMIN — MORPHINE SULFATE 2 MG: 2 INJECTION, SOLUTION INTRAMUSCULAR; INTRAVENOUS at 18:42

## 2020-03-07 RX ADMIN — POTASSIUM CHLORIDE 40 MEQ: 20 TABLET, EXTENDED RELEASE ORAL at 11:58

## 2020-03-07 RX ADMIN — MORPHINE SULFATE 2 MG: 2 INJECTION, SOLUTION INTRAMUSCULAR; INTRAVENOUS at 05:18

## 2020-03-07 RX ADMIN — MORPHINE SULFATE 2 MG: 2 INJECTION, SOLUTION INTRAMUSCULAR; INTRAVENOUS at 01:15

## 2020-03-07 RX ADMIN — NAFCILLIN SODIUM 1 G: 1 INJECTION, POWDER, LYOPHILIZED, FOR SOLUTION INTRAMUSCULAR; INTRAVENOUS at 09:57

## 2020-03-07 RX ADMIN — INSULIN LISPRO 1 UNITS: 100 INJECTION, SOLUTION INTRAVENOUS; SUBCUTANEOUS at 02:00

## 2020-03-07 RX ADMIN — DEXTROSE MONOHYDRATE 12.5 G: 25 INJECTION, SOLUTION INTRAVENOUS at 04:23

## 2020-03-07 RX ADMIN — MAGNESIUM SULFATE HEPTAHYDRATE 2 G: 40 INJECTION, SOLUTION INTRAVENOUS at 11:58

## 2020-03-07 RX ADMIN — INSULIN LISPRO 3 UNITS: 100 INJECTION, SOLUTION INTRAVENOUS; SUBCUTANEOUS at 20:04

## 2020-03-07 RX ADMIN — SODIUM CHLORIDE: 9 INJECTION, SOLUTION INTRAVENOUS at 01:00

## 2020-03-07 RX ADMIN — NAFCILLIN SODIUM 1 G: 1 INJECTION, POWDER, LYOPHILIZED, FOR SOLUTION INTRAMUSCULAR; INTRAVENOUS at 06:20

## 2020-03-07 RX ADMIN — Medication 10 ML: at 20:06

## 2020-03-07 ASSESSMENT — PAIN SCALES - GENERAL
PAINLEVEL_OUTOF10: 0
PAINLEVEL_OUTOF10: 4
PAINLEVEL_OUTOF10: 8
PAINLEVEL_OUTOF10: 8
PAINLEVEL_OUTOF10: 5
PAINLEVEL_OUTOF10: 7
PAINLEVEL_OUTOF10: 9
PAINLEVEL_OUTOF10: 0
PAINLEVEL_OUTOF10: 4
PAINLEVEL_OUTOF10: 8

## 2020-03-07 ASSESSMENT — PAIN - FUNCTIONAL ASSESSMENT
PAIN_FUNCTIONAL_ASSESSMENT: ACTIVITIES ARE NOT PREVENTED

## 2020-03-07 ASSESSMENT — PAIN DESCRIPTION - FREQUENCY
FREQUENCY: INTERMITTENT
FREQUENCY: CONTINUOUS
FREQUENCY: INTERMITTENT

## 2020-03-07 ASSESSMENT — PAIN DESCRIPTION - DESCRIPTORS
DESCRIPTORS: ACHING;DISCOMFORT
DESCRIPTORS: ACHING;SORE
DESCRIPTORS: ACHING;DISCOMFORT
DESCRIPTORS: ACHING;DISCOMFORT
DESCRIPTORS: ACHING;SORE
DESCRIPTORS: ACHING;DISCOMFORT
DESCRIPTORS: ACHING
DESCRIPTORS: ACHING;SHARP;THROBBING

## 2020-03-07 ASSESSMENT — PAIN DESCRIPTION - ONSET
ONSET: ON-GOING

## 2020-03-07 ASSESSMENT — PAIN DESCRIPTION - PAIN TYPE
TYPE: ACUTE PAIN

## 2020-03-07 ASSESSMENT — PAIN DESCRIPTION - LOCATION
LOCATION: BACK

## 2020-03-07 ASSESSMENT — PAIN DESCRIPTION - PROGRESSION
CLINICAL_PROGRESSION: GRADUALLY WORSENING
CLINICAL_PROGRESSION: NOT CHANGED
CLINICAL_PROGRESSION: NOT CHANGED
CLINICAL_PROGRESSION: GRADUALLY WORSENING

## 2020-03-07 NOTE — H&P
Prior to Admission:    Prior to Admission medications    Medication Sig Start Date End Date Taking? Authorizing Provider   insulin glargine (BASAGLAR KWIKPEN) 100 UNIT/ML injection pen Inject 25 Units into the skin nightly 8/15/19  Yes Mi Arshad MD   NOVOLOG FLEXPEN 100 UNIT/ML injection pen Three time a day before meals using carb ratio 1u:10g + sliding scale. MAX 50 units daily 8/15/19  Yes Mi Arshad MD   FREESTYLE LITE strip Checks BS four times a day before meals and at bedtime and also as needed for high and low blood sugar 8/15/19   Mi Arshad MD   acetone, urine, test strip Use daily as directed if bs >250 x2 or illness 8/31/18   Historical Provider, MD   BD PEN NEEDLE SHELBIE U/F 32G X 4 MM MISC USE AS DIRECTED UP TO 6 TIMES A DAY 8/31/18   Historical Provider, MD       Allergies:    Patient has no known allergies. Social History:    reports that she has never smoked. She has never used smokeless tobacco. She reports that she does not drink alcohol or use drugs. Family History:   family history includes Diabetes in her maternal grandmother and paternal grandmother; Stroke in an other family member. PHYSICAL EXAM:  Vitals:  BP (!) 100/56   Pulse 105   Temp 97.9 °F (36.6 °C) (Oral)   Resp 18   Ht 5' 1\" (1.549 m)   Wt 105 lb 8 oz (47.9 kg)   LMP 02/25/2020   SpO2 97%   BMI 19.93 kg/m²     General Appearance: alert and oriented to person, place and time and in no acute distress  Skin: warm and dry  Head: normocephalic and atraumatic  Eyes: pupils equal, round, and reactive to light, extraocular eye movements intact, conjunctivae normal  Neck: neck supple and non tender without mass   Pulmonary/Chest: clear to auscultation bilaterally- no wheezes, rales or rhonchi, normal air movement, no respiratory distress  Cardiovascular: normal rate, normal S1 and S2 and no carotid bruits  Abdomen: soft, non-tender, non-distended, normal bowel sounds, no masses or organomegaly. cva tenderness b/l  Extremities: no cyanosis, no clubbing and no edema  Neurologic: no cranial nerve deficit and speech normal        LABS:  Recent Labs     03/06/20  1754   *   K 4.3   CL 97*   CO2 19*   BUN 16   CREATININE 0.7   GLUCOSE 598*   CALCIUM 9.3       Recent Labs     03/06/20  1754   WBC 6.7   RBC 4.87   HGB 13.9   HCT 41.3   MCV 84.8   MCH 28.5   MCHC 33.7   RDW 12.4      MPV 10.8       No results for input(s): POCGLU in the last 72 hours. CBC:   Lab Results   Component Value Date    WBC 6.7 03/06/2020    RBC 4.87 03/06/2020    HGB 13.9 03/06/2020    HCT 41.3 03/06/2020    MCV 84.8 03/06/2020    MCH 28.5 03/06/2020    MCHC 33.7 03/06/2020    RDW 12.4 03/06/2020     03/06/2020    MPV 10.8 03/06/2020     BMP:    Lab Results   Component Value Date     03/06/2020    K 4.3 03/06/2020    K 4.2 11/30/2019    CL 97 03/06/2020    CO2 19 03/06/2020    BUN 16 03/06/2020    LABALBU 4.0 03/06/2020    CREATININE 0.7 03/06/2020    CALCIUM 9.3 03/06/2020    GFRAA >60 03/06/2020    LABGLOM >60 03/06/2020    GLUCOSE 598 03/06/2020       Radiology:   XR CHEST PORTABLE   Final Result   No acute disease. CT ABDOMEN PELVIS W IV CONTRAST Additional Contrast? None    (Results Pending)       EKG: nsr    ASSESSMENT:      Active Problems:    Hyperglycemia  Resolved Problems:    * No resolved hospital problems. *      PLAN:    DKA, mild, likely secondary to acute complicated pyelonephritis; status post IV insulin administration in the ER. No need for insulin drip at this time. Resume IV fluids. Blood glucose checks every hourly for now. BMP, mag, Ashley every 4 hourly for now. Resume with sliding scale insulin and her long-acting insulin with decreased dose. Obtain ID consult due to complicated UTI and recent coagulase negative staph UTI with nafcillin sensitivity. Start on Rocephin IV and nafcillin IV for now. Obtain urine cultures.   Type 1 diabetes mellitus; possible underlying

## 2020-03-07 NOTE — CONSULTS
10 mL Intravenous BID    insulin glargine  10 Units Subcutaneous Nightly    enoxaparin  40 mg Subcutaneous Daily    magnesium sulfate  2 g Intravenous Once    potassium chloride  40 mEq Oral Once     Continuous Infusions:   sodium chloride 100 mL/hr at 03/07/20 1044    dextrose       PRN Meds:morphine, sodium chloride flush, glucose, dextrose, glucagon (rDNA), dextrose    Allergies:  Patient has no known allergies. Social History:   Social History     Socioeconomic History    Marital status: Single     Spouse name: None    Number of children: None    Years of education: None    Highest education level: None   Occupational History    None   Social Needs    Financial resource strain: None    Food insecurity     Worry: None     Inability: None    Transportation needs     Medical: None     Non-medical: None   Tobacco Use    Smoking status: Never Smoker    Smokeless tobacco: Never Used   Substance and Sexual Activity    Alcohol use: No    Drug use: No    Sexual activity: Not Currently     Partners: Male   Lifestyle    Physical activity     Days per week: None     Minutes per session: None    Stress: None   Relationships    Social connections     Talks on phone: None     Gets together: None     Attends [employer] service: None     Active member of club or organization: None     Attends meetings of clubs or organizations: None     Relationship status: None    Intimate partner violence     Fear of current or ex partner: None     Emotionally abused: None     Physically abused: None     Forced sexual activity: None   Other Topics Concern    None   Social History Narrative    None      Pets: Dog  Travel: No  The patient lives with her grandmother. She works at [employer] point    Family History:       Problem Relation Age of Onset    Diabetes Maternal Grandmother     Diabetes Paternal Grandmother     Stroke Other     Thyroid Disease Neg Hx    .  Otherwise non-pertinent to the chief complaint. REVIEW OF SYSTEMS:    Constitutional: Negative for fevers, chills, diaphoresis  Neurologic: Negative   Psychiatric: Negative  Rheumatologic: Negative   Endocrine: Diabetes  Hematologic: Negative  Immunologic: Negative  ENT: Negative  Respiratory: Negative   Cardiovascular: Negative  GI: Negative  : Positive for vaginal discharge. Denies burning, urgency or frequency on urination  Musculoskeletal: Negative  Skin: No rashes. PHYSICAL EXAM:    Vitals:   /66   Pulse 89   Temp 97.7 °F (36.5 °C) (Oral)   Resp 16   Ht 5' 1\" (1.549 m)   Wt 105 lb 8 oz (47.9 kg)   LMP 02/25/2020   SpO2 98%   BMI 19.93 kg/m²   Constitutional: The patient is awake, alert, and oriented. No distress. Skin: Warm and dry. No rashes were noted. HEENT: Eyes show round, and reactive pupils. No jaundice. Moist mucous membranes, no ulcerations, no thrush. Neck: Supple to movements. No lymphadenopathy. Chest: No use of accessory muscles to breathe. Symmetrical expansion. Auscultation reveals no wheezing, crackles, or rhonchi. Cardiovascular: S1 and S2 are rhythmic and regular. No murmurs appreciated. Abdomen: Positive bowel sounds to auscultation. Benign to palpation. No masses felt. No hepatosplenomegaly. Bilateral CVA tenderness. Extremities: No clubbing, no cyanosis, no edema.   Lines: peripheral      CBC+dif:  Recent Labs     03/06/20  1754 03/07/20  0628   WBC 6.7 7.6   HGB 13.9 11.6   HCT 41.3 34.2   MCV 84.8 84.0    239   NEUTROABS 3.57  --      No results found for: CRP   No results found for: CRPHS  No results found for: SEDRATE  Lab Results   Component Value Date    ALT 21 03/06/2020    AST 30 03/06/2020    ALKPHOS 88 03/06/2020    BILITOT 0.4 03/06/2020     Lab Results   Component Value Date     03/07/2020    K 3.2 03/07/2020    K 4.2 11/30/2019     03/07/2020    CO2 17 03/07/2020    BUN 9 03/07/2020    CREATININE 0.5 03/07/2020    GFRAA >60 03/07/2020    LABGLOM >60 03/07/2020    GLUCOSE 109 03/07/2020    PROT 7.3 03/06/2020    LABALBU 4.0 03/06/2020    CALCIUM 8.4 03/07/2020    BILITOT 0.4 03/06/2020    ALKPHOS 88 03/06/2020    AST 30 03/06/2020    ALT 21 03/06/2020       Lab Results   Component Value Date    PROTIME 11.6 02/10/2019    INR 1.0 02/10/2019       No results found for: TSH    Lab Results   Component Value Date    NITRITE negative 04/17/2018    COLORU Straw 03/06/2020    PHUR 6.0 03/06/2020    LABCAST FEW 01/22/2018    WBCUA 10-20 03/06/2020    RBCUA 0-1 03/06/2020    TRICHOMONAS FEW 11/30/2019    YEAST RARE 10/28/2019    BACTERIA MODERATE 03/06/2020    CLARITYU Clear 03/06/2020    SPECGRAV 1.010 03/06/2020    LEUKOCYTESUR Negative 03/06/2020    UROBILINOGEN 0.2 03/06/2020    BILIRUBINUR Negative 03/06/2020    BILIRUBINUR negative 04/17/2018    BLOODU Negative 03/06/2020    GLUCOSEU >=1000 03/06/2020       Lab Results   Component Value Date    HCO3 14.7 03/06/2020    BE -10.5 03/06/2020    O2SAT 96.3 03/06/2020    PH 7.294 03/06/2020    PCO2 31.0 03/06/2020    PO2 94.1 03/06/2020     Radiology:  CT reviewed    Microbiology:  Pending  No results for input(s): BC in the last 72 hours. Recent Labs     03/06/20  1754   ORG Gram positive cocci*     No results for input(s): BLOODCULT2 in the last 72 hours. No results for input(s): STREPNEUMAGU in the last 72 hours. No results for input(s): LP1UAG in the last 72 hours. No results for input(s): ASO in the last 72 hours. No results for input(s): CULTRESP in the last 72 hours. No results for input(s): PROCAL in the last 72 hours. Assessment:  · Possible pyelonephritis. Cultures done a couple of weeks ago grew CoNS, resistant only to Bactrim. CT of the abdomen and pelvis was unremarkable.   She does have, however bilateral CVA tenderness  · Vaginal discharge  · Possible sexually transmitted infection    Plan:    · Continue Ceftriaxone  · Stop Nafcillin  · Urine for Chlamydia and Gonorrhea by PCR  · Check cultures,

## 2020-03-07 NOTE — PLAN OF CARE
Problem: Pain:  Goal: Pain level will decrease  Description: Pain level will decrease  Outcome: Met This Shift     Problem: Pain:  Goal: Control of acute pain  Description: Control of acute pain  Outcome: Met This Shift     Problem: Pain:  Goal: Control of chronic pain  Description: Control of chronic pain  Outcome: Met This Shift     Problem: Serum Glucose Level - Abnormal:  Goal: Ability to maintain appropriate glucose levels has stabilized  Description: Ability to maintain appropriate glucose levels has stabilized  Outcome: Met This Shift

## 2020-03-07 NOTE — PROGRESS NOTES
[address] [facility]   [facility]    Admitting Date and Time: 3/6/2020  5:30 PM  Admit Dx: Hyperglycemia [R73.9]  Hyperglycemia [R73.9]    Subjective:    Pt feels better this morning. Still having some lower back pain. Patient states she is awaiting a diet that she is hungry this morning. We are currently awaiting a morning BMP result before diet resumed. Per RN: No events through the evening and night. Patient awaiting diet. ROS: denies fever, chills, cp, sob, n/v, HA unless stated above.  cefTRIAXone (ROCEPHIN) IV  1 g Intravenous Q24H    insulin lispro  0-6 Units Subcutaneous TID WC    insulin lispro  0-3 Units Subcutaneous Nightly    sodium chloride flush  10 mL Intravenous BID    insulin glargine  10 Units Subcutaneous Nightly    nafcillin IVPB  1 g Intravenous Q4H    enoxaparin  40 mg Subcutaneous Daily    cefTRIAXone         morphine, 2 mg, Q4H PRN  sodium chloride flush, 10 mL, PRN  glucose, 15 g, PRN  dextrose, 12.5 g, PRN  glucagon (rDNA), 1 mg, PRN  dextrose, 100 mL/hr, PRN         Objective:    /66   Pulse 89   Temp 97.7 °F (36.5 °C) (Oral)   Resp 16   Ht 5' 1\" (1.549 m)   Wt 105 lb 8 oz (47.9 kg)   LMP 02/25/2020   SpO2 98%   BMI 19.93 kg/m²   General Appearance: alert and oriented to person, place and time and in no acute distress  Skin: warm and dry  Head: normocephalic and atraumatic  Eyes: pupils equal, round, and reactive to light, extraocular eye movements intact, conjunctivae normal  Neck: neck supple and non tender without mass   Pulmonary/Chest: clear to auscultation bilaterally- no wheezes, rales or rhonchi, normal air movement, no respiratory distress  Cardiovascular: normal rate, normal S1 and S2 no rubs gallops or murmur.   Abdomen: soft, mild-tender, non-distended, normal bowel sounds, no masses or organomegaly  Extremities: no cyanosis, no clubbing and no edema  Neurologic: no cranial nerve deficit and speech normal      Recent Labs

## 2020-03-08 LAB
ANION GAP SERPL CALCULATED.3IONS-SCNC: 15 MMOL/L (ref 7–16)
BUN BLDV-MCNC: 4 MG/DL (ref 6–20)
C-REACTIVE PROTEIN: <0.1 MG/DL (ref 0–0.4)
CALCIUM SERPL-MCNC: 8.2 MG/DL (ref 8.6–10.2)
CHLORIDE BLD-SCNC: 104 MMOL/L (ref 98–107)
CO2: 18 MMOL/L (ref 22–29)
CREAT SERPL-MCNC: 0.5 MG/DL (ref 0.5–1)
GFR AFRICAN AMERICAN: >60
GFR NON-AFRICAN AMERICAN: >60 ML/MIN/1.73
GLUCOSE BLD-MCNC: 215 MG/DL (ref 74–99)
HCT VFR BLD CALC: 33.7 % (ref 34–48)
HEMOGLOBIN: 11.3 G/DL (ref 11.5–15.5)
MAGNESIUM: 1.6 MG/DL (ref 1.6–2.6)
MCH RBC QN AUTO: 29.2 PG (ref 26–35)
MCHC RBC AUTO-ENTMCNC: 33.5 % (ref 32–34.5)
MCV RBC AUTO: 87.1 FL (ref 80–99.9)
METER GLUCOSE: 201 MG/DL (ref 74–99)
METER GLUCOSE: 217 MG/DL (ref 74–99)
METER GLUCOSE: 294 MG/DL (ref 74–99)
METER GLUCOSE: 301 MG/DL (ref 74–99)
METER GLUCOSE: 344 MG/DL (ref 74–99)
ORGANISM: ABNORMAL
PDW BLD-RTO: 12.6 FL (ref 11.5–15)
PHOSPHORUS: 3.1 MG/DL (ref 2.5–4.5)
PLATELET # BLD: 200 E9/L (ref 130–450)
PMV BLD AUTO: 11 FL (ref 7–12)
POTASSIUM SERPL-SCNC: 3.9 MMOL/L (ref 3.5–5)
RBC # BLD: 3.87 E12/L (ref 3.5–5.5)
SEDIMENTATION RATE, ERYTHROCYTE: 14 MM/HR (ref 0–20)
SODIUM BLD-SCNC: 137 MMOL/L (ref 132–146)
URINE CULTURE, ROUTINE: ABNORMAL
URINE CULTURE, ROUTINE: ABNORMAL
WBC # BLD: 4.9 E9/L (ref 4.5–11.5)

## 2020-03-08 PROCEDURE — 85027 COMPLETE CBC AUTOMATED: CPT

## 2020-03-08 PROCEDURE — 6370000000 HC RX 637 (ALT 250 FOR IP): Performed by: NURSE PRACTITIONER

## 2020-03-08 PROCEDURE — 6360000002 HC RX W HCPCS: Performed by: INTERNAL MEDICINE

## 2020-03-08 PROCEDURE — 6370000000 HC RX 637 (ALT 250 FOR IP): Performed by: SPECIALIST

## 2020-03-08 PROCEDURE — 96372 THER/PROPH/DIAG INJ SC/IM: CPT

## 2020-03-08 PROCEDURE — 86140 C-REACTIVE PROTEIN: CPT

## 2020-03-08 PROCEDURE — 84100 ASSAY OF PHOSPHORUS: CPT

## 2020-03-08 PROCEDURE — 36415 COLL VENOUS BLD VENIPUNCTURE: CPT

## 2020-03-08 PROCEDURE — 83735 ASSAY OF MAGNESIUM: CPT

## 2020-03-08 PROCEDURE — 6370000000 HC RX 637 (ALT 250 FOR IP): Performed by: INTERNAL MEDICINE

## 2020-03-08 PROCEDURE — 2580000003 HC RX 258: Performed by: RADIOLOGY

## 2020-03-08 PROCEDURE — 99225 PR SBSQ OBSERVATION CARE/DAY 25 MINUTES: CPT | Performed by: INTERNAL MEDICINE

## 2020-03-08 PROCEDURE — 85651 RBC SED RATE NONAUTOMATED: CPT

## 2020-03-08 PROCEDURE — 2580000003 HC RX 258: Performed by: INTERNAL MEDICINE

## 2020-03-08 PROCEDURE — 96361 HYDRATE IV INFUSION ADD-ON: CPT

## 2020-03-08 PROCEDURE — 80048 BASIC METABOLIC PNL TOTAL CA: CPT

## 2020-03-08 PROCEDURE — G0378 HOSPITAL OBSERVATION PER HR: HCPCS

## 2020-03-08 PROCEDURE — 82962 GLUCOSE BLOOD TEST: CPT

## 2020-03-08 PROCEDURE — 96376 TX/PRO/DX INJ SAME DRUG ADON: CPT

## 2020-03-08 PROCEDURE — APPSS30 APP SPLIT SHARED TIME 16-30 MINUTES: Performed by: NURSE PRACTITIONER

## 2020-03-08 RX ORDER — INSULIN GLARGINE 100 [IU]/ML
25 INJECTION, SOLUTION SUBCUTANEOUS NIGHTLY
Status: DISCONTINUED | OUTPATIENT
Start: 2020-03-08 | End: 2020-03-09 | Stop reason: HOSPADM

## 2020-03-08 RX ORDER — AMOXICILLIN 250 MG/1
500 CAPSULE ORAL EVERY 8 HOURS SCHEDULED
Status: DISCONTINUED | OUTPATIENT
Start: 2020-03-08 | End: 2020-03-09 | Stop reason: HOSPADM

## 2020-03-08 RX ADMIN — AMOXICILLIN 500 MG: 250 CAPSULE ORAL at 13:25

## 2020-03-08 RX ADMIN — INSULIN LISPRO 3 UNITS: 100 INJECTION, SOLUTION INTRAVENOUS; SUBCUTANEOUS at 06:21

## 2020-03-08 RX ADMIN — Medication 10 ML: at 09:03

## 2020-03-08 RX ADMIN — MORPHINE SULFATE 2 MG: 2 INJECTION, SOLUTION INTRAMUSCULAR; INTRAVENOUS at 20:38

## 2020-03-08 RX ADMIN — INSULIN LISPRO 2 UNITS: 100 INJECTION, SOLUTION INTRAVENOUS; SUBCUTANEOUS at 20:26

## 2020-03-08 RX ADMIN — SODIUM CHLORIDE: 9 INJECTION, SOLUTION INTRAVENOUS at 00:44

## 2020-03-08 RX ADMIN — AMOXICILLIN 500 MG: 250 CAPSULE ORAL at 20:30

## 2020-03-08 RX ADMIN — MORPHINE SULFATE 2 MG: 2 INJECTION, SOLUTION INTRAMUSCULAR; INTRAVENOUS at 14:53

## 2020-03-08 RX ADMIN — MORPHINE SULFATE 2 MG: 2 INJECTION, SOLUTION INTRAMUSCULAR; INTRAVENOUS at 09:03

## 2020-03-08 RX ADMIN — INSULIN GLARGINE 25 UNITS: 100 INJECTION, SOLUTION SUBCUTANEOUS at 20:26

## 2020-03-08 RX ADMIN — MORPHINE SULFATE 2 MG: 2 INJECTION, SOLUTION INTRAMUSCULAR; INTRAVENOUS at 00:44

## 2020-03-08 RX ADMIN — INSULIN LISPRO 2 UNITS: 100 INJECTION, SOLUTION INTRAVENOUS; SUBCUTANEOUS at 11:36

## 2020-03-08 RX ADMIN — WATER 1 G: 1 INJECTION INTRAMUSCULAR; INTRAVENOUS; SUBCUTANEOUS at 05:29

## 2020-03-08 RX ADMIN — INSULIN LISPRO 2 UNITS: 100 INJECTION, SOLUTION INTRAVENOUS; SUBCUTANEOUS at 16:21

## 2020-03-08 RX ADMIN — Medication 10 ML: at 20:38

## 2020-03-08 ASSESSMENT — PAIN DESCRIPTION - FREQUENCY
FREQUENCY: CONTINUOUS

## 2020-03-08 ASSESSMENT — PAIN - FUNCTIONAL ASSESSMENT
PAIN_FUNCTIONAL_ASSESSMENT: PREVENTS OR INTERFERES SOME ACTIVE ACTIVITIES AND ADLS

## 2020-03-08 ASSESSMENT — PAIN SCALES - GENERAL
PAINLEVEL_OUTOF10: 0
PAINLEVEL_OUTOF10: 7
PAINLEVEL_OUTOF10: 4
PAINLEVEL_OUTOF10: 0
PAINLEVEL_OUTOF10: 0
PAINLEVEL_OUTOF10: 9
PAINLEVEL_OUTOF10: 0
PAINLEVEL_OUTOF10: 10

## 2020-03-08 ASSESSMENT — PAIN DESCRIPTION - LOCATION
LOCATION: BACK
LOCATION: BACK
LOCATION: BACK;LEG

## 2020-03-08 ASSESSMENT — PAIN DESCRIPTION - PAIN TYPE
TYPE: ACUTE PAIN

## 2020-03-08 ASSESSMENT — PAIN DESCRIPTION - DESCRIPTORS
DESCRIPTORS: ACHING;DISCOMFORT;SORE

## 2020-03-08 ASSESSMENT — PAIN DESCRIPTION - PROGRESSION
CLINICAL_PROGRESSION: NOT CHANGED
CLINICAL_PROGRESSION: GRADUALLY WORSENING
CLINICAL_PROGRESSION: GRADUALLY WORSENING

## 2020-03-08 ASSESSMENT — PAIN DESCRIPTION - ONSET
ONSET: ON-GOING

## 2020-03-08 ASSESSMENT — PAIN DESCRIPTION - ORIENTATION: ORIENTATION: RIGHT;LEFT

## 2020-03-08 NOTE — PROGRESS NOTES
CREATININE 0.5 0.5 0.5   GLUCOSE 109* 132* 215*   CALCIUM 8.4* 8.0* 8.2*       Recent Labs     03/06/20  1754 03/07/20  0628 03/08/20  0850   WBC 6.7 7.6 4.9   RBC 4.87 4.07 3.87   HGB 13.9 11.6 11.3*   HCT 41.3 34.2 33.7*   MCV 84.8 84.0 87.1   MCH 28.5 28.5 29.2   MCHC 33.7 33.9 33.5   RDW 12.4 12.4 12.6    239 200   MPV 10.8 10.7 11.0           Radiology:   CT ABDOMEN PELVIS W IV CONTRAST Additional Contrast? None   Final Result   No acute findings. This report has been electronically signed by Maureen Bunch MD.      XR CHEST PORTABLE   Final Result   No acute disease. Assessment:  Principal Problem:    DKA, type 1, not at goal [facility])  Active Problems:    Hyperglycemia    UTI (urinary tract infection)    Pyelonephritis  Resolved Problems:    * No resolved hospital problems. *      Plan:  1. DKA, type I, not at goal- patient presented early this morning with hyperglycemia glucose 598. Beta hydroxybutyrate+, A1c 8/2019 14.2. Anion gap 15. HCO3 19.  pH 7.29. Received IV fluid hydration. Humulin R 5 units IV x. 1. Placed on SSI. N p.o.lactic acid, Beta-hydroxybutyrate, and A1C in am   2. Hyperglycemia- A1C 8/2019 14.2. IVF hydration Glucose 201. Increase Lantus 25 Units HS  3. Abdominal pain-+UTI. Antibiotic therapy initiated. Feels much better this morning. Analgesics as needed. 4.  UTI- afebrile no leukocytosis noted. 2/18/2020 urine culture positive coagulase negative staphylococci. 3/6/2020 Urine culture +Strep agalactiae (Beta strep group B)  ID input appreciated. Amoxicillin x 10 days as per ID. Disposition home tomorrow    NOTE: This report was transcribed using voice recognition software. Every effort was made to ensure accuracy; however, inadvertent computerized transcription errors may be present.     Electronically signed by Will Monte.  Ritchie Aguirre CNP on 3/8/2020 at 11:43 AM   HOSPITALIST ATTENDING PHYSICIAN NOTE 3/8/2020 1511PM:    Details of the evaluation - subjective assessment (including medication profile, past medical, family and social history when applicable), examination, review of lab and test data, diagnostic impressions and medical decision making - performed by [facility]. Keisha Aguirre CNP, were discussed with me on the date of service and I agree with clinical information herein unless otherwise noted. The patient has been evaluated by me personally earlier today. Pt reports no fevers, chills,n/v.     Exam: heart reg at rate of 90,lungs cta, abd pos bs soft nt, ext neg for le edema    I agree with the assessment and plan of Jovanny Frank. Keisha Aguirre CNP    dka/acidosis  Dm type 1 uncontrolled  pyelonephritis  Dehydration  Hypokalemia  hypomagnesemia          Electronically signed by Dahlia Ortega D.O.   Hospitalist  4M Hospitalist Service at [facility]

## 2020-03-09 VITALS
RESPIRATION RATE: 18 BRPM | HEIGHT: 61 IN | WEIGHT: 111 LBS | TEMPERATURE: 98 F | HEART RATE: 96 BPM | SYSTOLIC BLOOD PRESSURE: 134 MMHG | OXYGEN SATURATION: 99 % | BODY MASS INDEX: 20.96 KG/M2 | DIASTOLIC BLOOD PRESSURE: 90 MMHG

## 2020-03-09 LAB
ANION GAP SERPL CALCULATED.3IONS-SCNC: 13 MMOL/L (ref 7–16)
BETA-HYDROXYBUTYRATE: 0.16 MMOL/L (ref 0.02–0.27)
BUN BLDV-MCNC: 11 MG/DL (ref 6–20)
CALCIUM SERPL-MCNC: 8.7 MG/DL (ref 8.6–10.2)
CHLORIDE BLD-SCNC: 100 MMOL/L (ref 98–107)
CO2: 23 MMOL/L (ref 22–29)
CREAT SERPL-MCNC: 0.7 MG/DL (ref 0.5–1)
GFR AFRICAN AMERICAN: >60
GFR NON-AFRICAN AMERICAN: >60 ML/MIN/1.73
GLUCOSE BLD-MCNC: 254 MG/DL (ref 74–99)
HBA1C MFR BLD: 15.9 % (ref 4–5.6)
LACTIC ACID: 4.1 MMOL/L (ref 0.5–2.2)
MAGNESIUM: 1.6 MG/DL (ref 1.6–2.6)
METER GLUCOSE: 241 MG/DL (ref 74–99)
METER GLUCOSE: 263 MG/DL (ref 74–99)
METER GLUCOSE: 292 MG/DL (ref 74–99)
PHOSPHORUS: 4.2 MG/DL (ref 2.5–4.5)
POTASSIUM SERPL-SCNC: 4 MMOL/L (ref 3.5–5)
SODIUM BLD-SCNC: 136 MMOL/L (ref 132–146)

## 2020-03-09 PROCEDURE — 36415 COLL VENOUS BLD VENIPUNCTURE: CPT

## 2020-03-09 PROCEDURE — G0378 HOSPITAL OBSERVATION PER HR: HCPCS

## 2020-03-09 PROCEDURE — 80048 BASIC METABOLIC PNL TOTAL CA: CPT

## 2020-03-09 PROCEDURE — 2580000003 HC RX 258: Performed by: EMERGENCY MEDICINE

## 2020-03-09 PROCEDURE — 83605 ASSAY OF LACTIC ACID: CPT

## 2020-03-09 PROCEDURE — 84100 ASSAY OF PHOSPHORUS: CPT

## 2020-03-09 PROCEDURE — 6370000000 HC RX 637 (ALT 250 FOR IP): Performed by: INTERNAL MEDICINE

## 2020-03-09 PROCEDURE — 99217 PR OBSERVATION CARE DISCHARGE MANAGEMENT: CPT | Performed by: INTERNAL MEDICINE

## 2020-03-09 PROCEDURE — 83735 ASSAY OF MAGNESIUM: CPT

## 2020-03-09 PROCEDURE — 82010 KETONE BODYS QUAN: CPT

## 2020-03-09 PROCEDURE — 96376 TX/PRO/DX INJ SAME DRUG ADON: CPT

## 2020-03-09 PROCEDURE — 96372 THER/PROPH/DIAG INJ SC/IM: CPT

## 2020-03-09 PROCEDURE — 6370000000 HC RX 637 (ALT 250 FOR IP): Performed by: SPECIALIST

## 2020-03-09 PROCEDURE — 6360000002 HC RX W HCPCS: Performed by: INTERNAL MEDICINE

## 2020-03-09 PROCEDURE — 82962 GLUCOSE BLOOD TEST: CPT

## 2020-03-09 PROCEDURE — 83036 HEMOGLOBIN GLYCOSYLATED A1C: CPT

## 2020-03-09 RX ORDER — AMOXICILLIN 500 MG/1
500 CAPSULE ORAL EVERY 8 HOURS SCHEDULED
Qty: 30 CAPSULE | Refills: 0 | Status: SHIPPED | OUTPATIENT
Start: 2020-03-09 | End: 2020-03-13

## 2020-03-09 RX ADMIN — AMOXICILLIN 500 MG: 250 CAPSULE ORAL at 14:06

## 2020-03-09 RX ADMIN — MORPHINE SULFATE 2 MG: 2 INJECTION, SOLUTION INTRAMUSCULAR; INTRAVENOUS at 06:51

## 2020-03-09 RX ADMIN — SODIUM CHLORIDE, PRESERVATIVE FREE 20 ML: 5 INJECTION INTRAVENOUS at 09:33

## 2020-03-09 RX ADMIN — MORPHINE SULFATE 2 MG: 2 INJECTION, SOLUTION INTRAMUSCULAR; INTRAVENOUS at 01:13

## 2020-03-09 RX ADMIN — INSULIN LISPRO 3 UNITS: 100 INJECTION, SOLUTION INTRAVENOUS; SUBCUTANEOUS at 06:15

## 2020-03-09 RX ADMIN — MORPHINE SULFATE 2 MG: 2 INJECTION, SOLUTION INTRAMUSCULAR; INTRAVENOUS at 11:03

## 2020-03-09 RX ADMIN — INSULIN LISPRO 2 UNITS: 100 INJECTION, SOLUTION INTRAVENOUS; SUBCUTANEOUS at 16:20

## 2020-03-09 RX ADMIN — AMOXICILLIN 500 MG: 250 CAPSULE ORAL at 06:15

## 2020-03-09 RX ADMIN — INSULIN LISPRO 3 UNITS: 100 INJECTION, SOLUTION INTRAVENOUS; SUBCUTANEOUS at 11:00

## 2020-03-09 ASSESSMENT — PAIN SCALES - GENERAL
PAINLEVEL_OUTOF10: 9
PAINLEVEL_OUTOF10: 7
PAINLEVEL_OUTOF10: 9

## 2020-03-09 NOTE — PROGRESS NOTES
[facility] Hospitalist   Progress Note    Admitting Date and Time: 3/6/2020  5:30 PM  Admit Dx: Hyperglycemia [R73.9]  Hyperglycemia [R73.9]    Subjective: Patient was admitted on seventh, came with abdominal pain and hypoglycemia, known diabetes type 1, did have diarrhea, was treated recently for UTI, admitted with mild DKA and acute complicated pyelonephritis. Seen by ID, patient was also recently treated for vaginal discharge, ceftriaxone was continued. Patient okay for DC from ID standpoint on amoxicillin. Urine with strep group B. Patient was admitted with Hyperglycemia [R73.9]  Hyperglycemia [R73.9]. Patient feels comfortable, awake, alert, in no distress. Does communicate well. Per RN: Informed by nursing of lactic acid which has been elevated. ROS: denies fever, chills, cp, sob, n/v, HA unless stated above.      amoxicillin  500 mg Oral 3 times per day    insulin glargine  25 Units Subcutaneous Nightly    insulin lispro  0-6 Units Subcutaneous TID WC    insulin lispro  0-3 Units Subcutaneous Nightly    sodium chloride flush  10 mL Intravenous BID    enoxaparin  40 mg Subcutaneous Daily     morphine, 2 mg, Q4H PRN  sodium chloride flush, 10 mL, PRN  glucose, 15 g, PRN  dextrose, 12.5 g, PRN  glucagon (rDNA), 1 mg, PRN  dextrose, 100 mL/hr, PRN         Objective:    /86   Pulse 93   Temp 98.1 °F (36.7 °C) (Oral)   Resp 18   Ht 5' 1\" (1.549 m)   Wt 111 lb (50.3 kg)   LMP 02/25/2020   SpO2 99%   BMI 20.97 kg/m²   General Appearance: alert and oriented to person, place and time, well-developed and well-nourished, in no acute distress  Skin: warm and dry, no rash or erythema  Head: normocephalic and atraumatic  Eyes: pupils equal, round, and reactive to light, extraocular eye movements intact, conjunctivae normal  ENT: tympanic membrane, external ear and ear canal normal bilaterally, oropharynx clear and moist with normal mucous membranes  Neck: neck supple and non tender without mass, no thyromegaly or thyroid nodules, no cervical lymphadenopathy   Pulmonary/Chest: clear to auscultation bilaterally- no wheezes, rales or rhonchi, normal air movement, no respiratory distress  Cardiovascular: normal rate, normal S1 and S2, no gallops, intact distal pulses and no carotid bruits  Abdomen: soft, non-tender, non-distended, normal bowel sounds, no masses or organomegaly  Patient does have renal angle tenderness. Recent Labs     03/07/20  1555 03/08/20  0850 03/09/20  0935    137 136   K 4.2 3.9 4.0    104 100   CO2 18* 18* 23   BUN 7 4* 11   CREATININE 0.5 0.5 0.7   GLUCOSE 132* 215* 254*   CALCIUM 8.0* 8.2* 8.7       Recent Labs     03/06/20  1754 03/07/20  0628 03/08/20  0850   WBC 6.7 7.6 4.9   RBC 4.87 4.07 3.87   HGB 13.9 11.6 11.3*   HCT 41.3 34.2 33.7*   MCV 84.8 84.0 87.1   MCH 28.5 28.5 29.2   MCHC 33.7 33.9 33.5   RDW 12.4 12.4 12.6    239 200   MPV 10.8 10.7 11.0     Anion gap of 13 today. Last  WBC is 4.9. Radiology:   CT ABDOMEN PELVIS W IV CONTRAST Additional Contrast? None   Final Result   No acute findings. This report has been electronically signed by Franny Bender MD.      XR CHEST PORTABLE   Final Result   No acute disease. Assessment:    Principal Problem:    DKA, type 1, not at goal [facility])  Active Problems:    Hyperglycemia    UTI (urinary tract infection)    Pyelonephritis    Acidosis    Dehydration    Hypokalemia  Resolved Problems:    * No resolved hospital problems. *      Plan:  1. Urine with group B infection, on amoxicillin, okay for DC from ID. 2. Persistent right renal angle tenderness, likely related to underlying pyelonephritis, though will improve with completion of antibiotic therapy. 3. Elevated lactic acid, patient is afebrile, will watch for several hours, recheck around 3 PM, and plan to discharge patient later if not worsening. 4. Will also get renal ultrasound.   More due to persistent tenderness.         Electronically signed by Teresa Garrett MD on 3/9/2020 at 10:58 AM

## 2020-03-09 NOTE — DISCHARGE SUMMARY
[facility] EMERGENCY SERVICE Physician Discharge Summary       Fifi Sosa DO  [address] [address]  [phone]            Activity level: As tolerated    Diet: DIET CARB CONTROL; Carb Control: 4 carbs/meal (approximate 1800 kcals/day)    Labs:    Dispo: Home    Condition at discharge: Stable    Continue supplemental oxygen via nasal canula @ 2 LPM round-the-clock. Continue CPAP / BiPAP during sleep as prior to admission. Patient ID:  Phil Yap  [id number]  3 y.o.  1995    Admit date: 3/6/2020    Discharge date and time:  3/9/2020  2:49 PM    Admission Diagnoses: Principal Problem:    DKA, type 1, not at goal [facility])  Active Problems:    Hyperglycemia    UTI (urinary tract infection)    Pyelonephritis    Acidosis    Dehydration    Hypokalemia  Resolved Problems:    * No resolved hospital problems. *      Discharge Diagnoses: Principal Problem:    DKA, type 1, not at goal [facility])  Active Problems:    Hyperglycemia    UTI (urinary tract infection)    Pyelonephritis    Acidosis    Dehydration    Hypokalemia  Resolved Problems:    * No resolved hospital problems. *      Consults:  IP CONSULT TO INFECTIOUS DISEASES  IP CONSULT TO IV TEAM  IP CONSULT TO IV TEAM  IP CONSULT TO IV TEAM  IP CONSULT TO IV TEAM    Procedures: None    Hospital Course: Patient was admitted with Hyperglycemia [R73.9]  Hyperglycemia [R73.9]. Patient was admitted on seventh, came with abdominal pain and hypoglycemia, known diabetes type 1, did have diarrhea, was treated recently for UTI, admitted with mild DKA and acute complicated pyelonephritis. Seen by ID, patient was also recently treated for vaginal discharge, ceftriaxone was continued. Patient okay for DC from ID standpoint on amoxicillin. Urine with strep group B. Patient once again was seen by ID today, okay for discharge with 10 days of amoxicillin, will continue with the DC process.     Discharge Exam:  Vitals: 03/08/20 0900 03/08/20 2015 03/08/20 2330 03/09/20 0900   BP: 119/80 133/87 120/88 127/86   Pulse: 89 97 91 93   Resp: 18 16 16 18   Temp: 98 °F (36.7 °C) 97.7 °F (36.5 °C) 98 °F (36.7 °C) 98.1 °F (36.7 °C)   TempSrc: Oral Oral Oral Oral   SpO2: 99% 100% 100% 99%   Weight:       Height:           General Appearance: alert and oriented to person, place and time, well-developed and well-nourished, in no acute distress  Skin: warm and dry, no rash or erythema  Head: normocephalic and atraumatic  Eyes: pupils equal, round, and reactive to light, extraocular eye movements intact, conjunctivae normal  ENT: tympanic membrane, external ear and ear canal normal bilaterally, oropharynx clear and moist with normal mucous membranes  Neck: neck supple and non tender without mass, no thyromegaly or thyroid nodules, no cervical lymphadenopathy   Pulmonary/Chest: clear to auscultation bilaterally- no wheezes, rales or rhonchi, normal air movement, no respiratory distress  Cardiovascular: normal rate, normal S1 and S2, no gallops, intact distal pulses and no carotid bruits  Abdomen: soft, non-tender, non-distended, normal bowel sounds, no masses or organomegaly  No intake/output data recorded. I/O this shift: In: 840 [P.O.:840]  Out: -       LABS:  Recent Labs     03/07/20  1555 03/08/20  0850 03/09/20  0935    137 136   K 4.2 3.9 4.0    104 100   CO2 18* 18* 23   BUN 7 4* 11   CREATININE 0.5 0.5 0.7   GLUCOSE 132* 215* 254*   CALCIUM 8.0* 8.2* 8.7       Recent Labs     03/06/20  1754 03/07/20  0628 03/08/20  0850   WBC 6.7 7.6 4.9   RBC 4.87 4.07 3.87   HGB 13.9 11.6 11.3*   HCT 41.3 34.2 33.7*   MCV 84.8 84.0 87.1   MCH 28.5 28.5 29.2   MCHC 33.7 33.9 33.5   RDW 12.4 12.4 12.6    239 200   MPV 10.8 10.7 11.0       No results for input(s): POCGLU in the last 72 hours. Imaging:   CT ABDOMEN PELVIS W IV CONTRAST Additional Contrast? None   Final Result   No acute findings.        This report has been electronically signed by Clarita Pina MD.      XR CHEST PORTABLE   Final Result   No acute disease. Patient Instructions:      Medication List      START taking these medications    amoxicillin 500 MG capsule  Commonly known as:  AMOXIL  Take 1 capsule by mouth every 8 hours for 10 days        CONTINUE taking these medications    acetone (urine) test strip     BD Pen Needle Camila U/F 32G X 4 MM Misc  Generic drug:  Insulin Pen Needle     FREESTYLE LITE strip  Generic drug:  blood glucose test strips  Checks BS four times a day before meals and at bedtime and also as needed for high and low blood sugar     insulin glargine 100 UNIT/ML injection pen  Commonly known as:  Basaglar KwikPen  Inject 25 Units into the skin nightly     NovoLOG FlexPen 100 UNIT/ML injection pen  Generic drug:  insulin aspart  Three time a day before meals using carb ratio 1u:10g + sliding scale. MAX 50 units daily           Where to Get Your Medications      These medications were sent to [address] [place] [address]    Phone:  [phone]   · amoxicillin 500 MG capsule           Note that more than 30 minutes was spent in preparing discharge papers, discussing discharge with patient, medication review, etc.    Signed:  Electronically signed by Jeannette Kline MD on 3/9/2020 at 2:49 PM    NOTE: This report was transcribed using voice recognition software. Every effort was made to ensure accuracy; however, inadvertent computerized transcription errors may be present.

## 2020-03-09 NOTE — CARE COORDINATION
3/9/2020  Social Work Discharge Planning: This worker met with Pt to discuss  role and transition of care/discharge planning. Pt resides with her grandmother, is independent and employed. Pt is a diabetic and has a glucometer and supplies. Pt states no needs at discharge. Electronically signed by PERNELL Garzon on 3/9/2020 at 11:00 AM

## 2020-03-09 NOTE — PROGRESS NOTES
Value Date    NEUTROABS 3.57 03/06/2020    NEUTROABS 4.91 11/30/2019    NEUTROABS 5.58 10/30/2019     No results found for: CRPHS  Lab Results   Component Value Date    ALT 21 03/06/2020    AST 30 03/06/2020    ALKPHOS 88 03/06/2020    BILITOT 0.4 03/06/2020     Lab Results   Component Value Date     03/09/2020    K 4.0 03/09/2020    K 4.2 11/30/2019     03/09/2020    CO2 23 03/09/2020    BUN 11 03/09/2020    CREATININE 0.7 03/09/2020    CREATININE 0.5 03/08/2020    CREATININE 0.5 03/07/2020    GFRAA >60 03/09/2020    LABGLOM >60 03/09/2020    GLUCOSE 254 03/09/2020    PROT 7.3 03/06/2020    LABALBU 4.0 03/06/2020    CALCIUM 8.7 03/09/2020    BILITOT 0.4 03/06/2020    ALKPHOS 88 03/06/2020    AST 30 03/06/2020    ALT 21 03/06/2020     Lab Results   Component Value Date    CRP <0.1 03/08/2020     Lab Results   Component Value Date    SEDRATE 14 03/08/2020     Radiology:      Microbiology:   Urine culture 3/6/2020 <10K GNR, >100 K Streptococcus agalactiae  Chlamydia and Neisseria in urine by ACR: Pending    ASSESSMENT:  · Pyelonephritis. Urine culture growing Streptococcus agalactiae. Clinically resolving    PLAN:  · Continue oral Amoxicillin for a minimum of 10 days  · The patient can be discharged from ID standpoint.   She can follow-up with her primary care physician    Anam Oakes  12:35 PM  3/9/2020

## 2020-03-10 NOTE — PROGRESS NOTES
CLINICAL PHARMACY NOTE: MEDS TO [address] Select Patient?: No  Total # of Prescriptions Filled: 1   The following medications were delivered to the patient:  · Amoxicillin 500 mg    Total # of Interventions Completed: 2  Time Spent (min): 30    Additional Documentation:

## 2020-03-11 LAB
C. TRACHOMATIS DNA ,URINE: NEGATIVE
SOURCE: NORMAL

## 2020-03-13 ENCOUNTER — OFFICE VISIT (OUTPATIENT)
Dept: ENDOCRINOLOGY | Age: 25
End: 2020-03-13
Payer: COMMERCIAL

## 2020-03-13 VITALS
RESPIRATION RATE: 14 BRPM | OXYGEN SATURATION: 99 % | WEIGHT: 105 LBS | DIASTOLIC BLOOD PRESSURE: 70 MMHG | HEART RATE: 101 BPM | BODY MASS INDEX: 19.83 KG/M2 | HEIGHT: 61 IN | SYSTOLIC BLOOD PRESSURE: 110 MMHG

## 2020-03-13 PROCEDURE — 1036F TOBACCO NON-USER: CPT | Performed by: INTERNAL MEDICINE

## 2020-03-13 PROCEDURE — G8427 DOCREV CUR MEDS BY ELIG CLIN: HCPCS | Performed by: INTERNAL MEDICINE

## 2020-03-13 PROCEDURE — G8484 FLU IMMUNIZE NO ADMIN: HCPCS | Performed by: INTERNAL MEDICINE

## 2020-03-13 PROCEDURE — 2022F DILAT RTA XM EVC RTNOPTHY: CPT | Performed by: INTERNAL MEDICINE

## 2020-03-13 PROCEDURE — 3046F HEMOGLOBIN A1C LEVEL >9.0%: CPT | Performed by: INTERNAL MEDICINE

## 2020-03-13 PROCEDURE — G8420 CALC BMI NORM PARAMETERS: HCPCS | Performed by: INTERNAL MEDICINE

## 2020-03-13 PROCEDURE — 99214 OFFICE O/P EST MOD 30 MIN: CPT | Performed by: INTERNAL MEDICINE

## 2020-03-13 RX ORDER — BLOOD-GLUCOSE METER
KIT MISCELLANEOUS
Qty: 250 EACH | Refills: 5 | Status: ON HOLD
Start: 2020-03-13 | End: 2020-05-07 | Stop reason: SDUPTHER

## 2020-03-13 NOTE — PROGRESS NOTES
ENDOCRINOLOGY CLINIC NOTE    Date of Service: 3/13/2020    Primary Care Physician: Zaina Gonzales DO. Provider: Bryan Trejo MD  Other provider(s):            Reason for the visit:  Type 1 DM, vitD deficiency      History of Present Illness: The history is provided by the patient. No  was used. Accuracy of the patient data is excellent. Echo Simental is a very pleasant 27 y.o. female seen in Endocrine clinic today for diabetes management     Echo Simental was diagnosed with diabetes at age 8 and currently on Basaglar 25 units daily at bedtime, Novolog 1u:10g + 1:50>150     Pt is poorly compliant with insulin therapy, diabetic diet and follow up visits. She is also non-compliant with BG checking     last visit with us was 7 months ago     Recently admitted to hospital with DKA     She was recently gabapentin by her PCP. A1c always high as shown below   Lab Results   Component Value Date    LABA1C 15.9 03/09/2020    LABA1C 14.2 08/06/2019    LABA1C 14.0 04/11/2019    LABA1C  02/14/2019      Comment:      code #106 too high to read  over 15     Patient denies any recent hypoglycemic episodes. The patient hasn't been mindful of what has been eating but knows how to count carbs. Her diabetes complicated with gastroparesis   I reviewed current medications and the patient has no issues with diabetes medications  Echo Simental is up to date with eye exam. Last eye exam was a year ago and denied any history of diabetic retinopathy. The patient performs her own feet care and doesn't see podiatry service   Microvascular complications: + Neuropathy. No Retinopathy, No Nephropathy   Macrovascular complications: no CAD, PVD, or Stroke  The patient receives Flushot every year. She has not received the pneumonia vaccine.     PAST MEDICAL HISTORY   Past Medical History:   Diagnosis Date    Bleeding at insertion site 1/5/2018    Common femoral artery injury, right, initial encounter 1/5/2018 constipation, No abdominal pain, no melena or hematochezia   : Denied any dysuria, hematuria, flank pain, discharge, or incontinence. Skin: denied any rash, ulcer, Hirsute, or hyperpigmentation. MSK: denied any joint deformity, joint pain/swelling, muscle pain, or back pain. Neuro: + numbness and tingling in lower extremities. OBJECTIVE    /70   Pulse 101   Resp 14   Ht 5' 1\" (1.549 m)   Wt 105 lb (47.6 kg)   LMP 02/25/2020   SpO2 99%   BMI 19.84 kg/m²   BP Readings from Last 4 Encounters:   03/13/20 110/70   03/09/20 (!) 134/90   02/18/20 117/72   11/30/19 112/67     Wt Readings from Last 6 Encounters:   03/13/20 105 lb (47.6 kg)   03/08/20 111 lb (50.3 kg)   02/18/20 101 lb (45.8 kg)   11/30/19 105 lb (47.6 kg)   11/25/19 105 lb (47.6 kg)   11/21/19 105 lb (47.6 kg)       Physical examination:  General: awake alert, oriented x3, no abnormal position or movements. HEENT: normocephalic non-traumatic, no exophthalmos   Neck: supple, no LN enlargement, no thyromegaly, no thyroid tenderness, no JVD. Pulm: Clear equal air entry no added sounds, no wheezing or rhonchi    CVS: S1 + S2, no murmur, no heave. Dorsalis pedis pulse palpable   Abd: soft lax, + epigastric tenderness, no organomegaly, audible bowel sounds. Skin: warm, no lesions, no rash.  No callus, no Ulcers, No acanthosis nigricans   Neuro: CN intact, Monofilament sensation abnormal bilateral , muscle power normal  Psych: normal mood, and affect      Review of Laboratory Data:  I have reviewed the following:  Lab Results   Component Value Date/Time    WBC 4.9 03/08/2020 08:50 AM    RBC 3.87 03/08/2020 08:50 AM    HGB 11.3 (L) 03/08/2020 08:50 AM    HCT 33.7 (L) 03/08/2020 08:50 AM    MCV 87.1 03/08/2020 08:50 AM    MCH 29.2 03/08/2020 08:50 AM    MCHC 33.5 03/08/2020 08:50 AM    RDW 12.6 03/08/2020 08:50 AM     03/08/2020 08:50 AM    MPV 11.0 03/08/2020 08:50 AM      Lab Results   Component Value Date/Time     03/09/2020 09:35 AM    K 4.0 03/09/2020 09:35 AM    K 4.2 11/30/2019 06:10 PM    CO2 23 03/09/2020 09:35 AM    BUN 11 03/09/2020 09:35 AM    CREATININE 0.7 03/09/2020 09:35 AM    CALCIUM 8.7 03/09/2020 09:35 AM    LABGLOM >60 03/09/2020 09:35 AM    GFRAA >60 03/09/2020 09:35 AM      No results found for: TSH, T4FREE, T2MLCSO, FT3, J1EXUVZ, TSI, TPOABS, THGAB  Lab Results   Component Value Date    LABA1C 15.9 03/09/2020    GLUCOSE 254 03/09/2020    MALBCR 204.6 01/23/2018    LABMICR 102.3 01/23/2018    LABCREA 50 01/23/2018     Lab Results   Component Value Date    CHOL 175 01/24/2018    TRIG 106 01/24/2018    HDL 40 01/24/2018     No results found for: [address] [facility] [address] Records/Labs/Images review:   I personally reviewed and summarized previous records   All labs were reviewed independently     Via Aisha Doty 60, a 27 y.o.-old female seen in for the following issues     Diabetes Mellitus Type 1    · Patient's diabetes is uncontrolled. A1c consistently > 10% due to poor compliance with insulin therapy, diabetic diet and BG checking. She is also poor no showed to multiple visits  · Take Basaglar 25 units daily, Novolog before meals 1:10g + ss 1:50>150   · The patient was advised to continue checking blood sugars 4 times a day before meals and at bedtime and fax the results to our office in a week. · Discussed with patient A1c and blood sugar goals   · Optimal blood sugars: 100-140 pre-prandial, < 180 peak post-prandial  · The patient counseled about the complications of uncontrolled diabetes   · Patient was counselled about the importance of self-blood glucose monitoring and eating consistent carb diet to avoid blood sugar fluctuations   · Patient will need routine diabetes maintenance and prevention. Scheduled for eye exam in few weeks   · Discussed lifestyle changes including diet and exercise with patient; recommended 150 minutes of moderate intensity exercise per week.      vitD

## 2020-04-06 PROBLEM — N39.0 UTI (URINARY TRACT INFECTION): Status: RESOLVED | Noted: 2020-03-07 | Resolved: 2020-04-06

## 2020-04-29 ENCOUNTER — HOSPITAL ENCOUNTER (INPATIENT)
Age: 25
LOS: 2 days | Discharge: HOME | DRG: 420 | End: 2020-05-01
Attending: EMERGENCY MEDICINE | Admitting: INTERNAL MEDICINE
Payer: COMMERCIAL

## 2020-04-29 ENCOUNTER — APPOINTMENT (OUTPATIENT)
Dept: GENERAL RADIOLOGY | Age: 25
DRG: 420 | End: 2020-04-29
Payer: COMMERCIAL

## 2020-04-29 LAB
ALBUMIN SERPL-MCNC: 4.8 G/DL (ref 3.5–5.2)
ALP BLD-CCNC: 115 U/L (ref 35–104)
ALT SERPL-CCNC: 27 U/L (ref 0–32)
ANION GAP SERPL CALCULATED.3IONS-SCNC: 34 MMOL/L (ref 7–16)
ANION GAP SERPL CALCULATED.3IONS-SCNC: 39 MMOL/L (ref 7–16)
AST SERPL-CCNC: 32 U/L (ref 0–31)
ATYPICAL LYMPHOCYTE RELATIVE PERCENT: 1 % (ref 0–4)
BACTERIA: ABNORMAL /HPF
BASOPHILS ABSOLUTE: 0 E9/L (ref 0–0.2)
BASOPHILS RELATIVE PERCENT: 0 % (ref 0–2)
BETA-HYDROXYBUTYRATE: >4.5 MMOL/L (ref 0.02–0.27)
BILIRUB SERPL-MCNC: 0.2 MG/DL (ref 0–1.2)
BILIRUBIN URINE: NEGATIVE
BLOOD, URINE: NEGATIVE
BUN BLDV-MCNC: 23 MG/DL (ref 6–20)
BUN BLDV-MCNC: 29 MG/DL (ref 6–20)
BURR CELLS: ABNORMAL
CALCIUM SERPL-MCNC: 10.2 MG/DL (ref 8.6–10.2)
CALCIUM SERPL-MCNC: 8.7 MG/DL (ref 8.6–10.2)
CHLORIDE BLD-SCNC: 105 MMOL/L (ref 98–107)
CHLORIDE BLD-SCNC: 91 MMOL/L (ref 98–107)
CHP ED QC CHECK: NORMAL
CLARITY: CLEAR
CO2: 3 MMOL/L (ref 22–29)
CO2: 4 MMOL/L (ref 22–29)
COHB: 0.7 % (ref 0–1.5)
COLOR: YELLOW
COMMENT: ABNORMAL
CREAT SERPL-MCNC: 0.6 MG/DL (ref 0.5–1)
CREAT SERPL-MCNC: 0.8 MG/DL (ref 0.5–1)
CRITICAL: ABNORMAL
DATE ANALYZED: ABNORMAL
DATE OF COLLECTION: ABNORMAL
EOSINOPHILS ABSOLUTE: 0.31 E9/L (ref 0.05–0.5)
EOSINOPHILS RELATIVE PERCENT: 1 % (ref 0–6)
EPITHELIAL CELLS, UA: ABNORMAL /HPF
GFR AFRICAN AMERICAN: >60
GFR AFRICAN AMERICAN: >60
GFR NON-AFRICAN AMERICAN: >60 ML/MIN/1.73
GFR NON-AFRICAN AMERICAN: >60 ML/MIN/1.73
GLUCOSE BLD-MCNC: 538 MG/DL (ref 74–99)
GLUCOSE BLD-MCNC: 600 MG/DL
GLUCOSE BLD-MCNC: 910 MG/DL (ref 74–99)
GLUCOSE URINE: >=1000 MG/DL
HCG, URINE, POC: NEGATIVE
HCT VFR BLD CALC: 45.9 % (ref 34–48)
HEMOGLOBIN: 14.9 G/DL (ref 11.5–15.5)
HHB: 1.5 % (ref 0–5)
KETONES, URINE: >=80 MG/DL
LAB: ABNORMAL
LACTIC ACID, SEPSIS: 4.4 MMOL/L (ref 0.5–1.9)
LACTIC ACID: 2.9 MMOL/L (ref 0.5–2.2)
LEUKOCYTE ESTERASE, URINE: NEGATIVE
LIPASE: 14 U/L (ref 13–60)
LYMPHOCYTES ABSOLUTE: 7.51 E9/L (ref 1.5–4)
LYMPHOCYTES RELATIVE PERCENT: 23 % (ref 20–42)
Lab: ABNORMAL
Lab: NORMAL
MAGNESIUM: 2.1 MG/DL (ref 1.6–2.6)
MCH RBC QN AUTO: 29 PG (ref 26–35)
MCHC RBC AUTO-ENTMCNC: 32.5 % (ref 32–34.5)
MCV RBC AUTO: 89.5 FL (ref 80–99.9)
METAMYELOCYTES RELATIVE PERCENT: 1 % (ref 0–1)
METER GLUCOSE: 164 MG/DL (ref 74–99)
METER GLUCOSE: 276 MG/DL (ref 74–99)
METER GLUCOSE: 311 MG/DL (ref 74–99)
METER GLUCOSE: >500 MG/DL (ref 74–99)
METHB: 0.4 % (ref 0–1.5)
MODE: ABNORMAL
MONOCYTES ABSOLUTE: 1.88 E9/L (ref 0.1–0.95)
MONOCYTES RELATIVE PERCENT: 6 % (ref 2–12)
MYELOCYTE PERCENT: 1 % (ref 0–0)
NEGATIVE QC PASS/FAIL: NORMAL
NEUTROPHILS ABSOLUTE: 21.6 E9/L (ref 1.8–7.3)
NEUTROPHILS RELATIVE PERCENT: 67 % (ref 43–80)
NITRITE, URINE: NEGATIVE
O2 CONTENT: 18.6 ML/DL
O2 SATURATION: 98.5 % (ref 92–98.5)
O2HB: 97.4 % (ref 94–97)
OPERATOR ID: 1893
OVALOCYTES: ABNORMAL
PATIENT TEMP: 37 C
PCO2: <15 MMHG (ref 35–45)
PDW BLD-RTO: 12 FL (ref 11.5–15)
PH BLOOD GAS: 7.2 (ref 7.35–7.45)
PH UA: 5.5 (ref 5–9)
PH VENOUS: 6.89 (ref 7.35–7.45)
PHOSPHORUS: 5.5 MG/DL (ref 2.5–4.5)
PLATELET # BLD: 379 E9/L (ref 130–450)
PMV BLD AUTO: 11.4 FL (ref 7–12)
PO2: 133.9 MMHG (ref 75–100)
POIKILOCYTES: ABNORMAL
POLYCHROMASIA: ABNORMAL
POSITIVE QC PASS/FAIL: NORMAL
POTASSIUM REFLEX MAGNESIUM: 5.6 MMOL/L (ref 3.5–5)
POTASSIUM SERPL-SCNC: 3.8 MMOL/L (ref 3.5–5)
PROTEIN UA: ABNORMAL MG/DL
RBC # BLD: 5.13 E12/L (ref 3.5–5.5)
RBC UA: ABNORMAL /HPF (ref 0–2)
SARS-COV-2, NAAT: NOT DETECTED
SMUDGE CELLS: ABNORMAL
SODIUM BLD-SCNC: 133 MMOL/L (ref 132–146)
SODIUM BLD-SCNC: 143 MMOL/L (ref 132–146)
SOURCE, BLOOD GAS: ABNORMAL
SPECIFIC GRAVITY UA: 1.02 (ref 1–1.03)
THB: 13.4 G/DL (ref 11.5–16.5)
TIME ANALYZED: 2119
TOTAL PROTEIN: 8.5 G/DL (ref 6.4–8.3)
TROPONIN: <0.01 NG/ML (ref 0–0.03)
UROBILINOGEN, URINE: 0.2 E.U./DL
WBC # BLD: 31.3 E9/L (ref 4.5–11.5)
WBC UA: ABNORMAL /HPF (ref 0–5)
YEAST: PRESENT /HPF

## 2020-04-29 PROCEDURE — 85025 COMPLETE CBC W/AUTO DIFF WBC: CPT

## 2020-04-29 PROCEDURE — 82962 GLUCOSE BLOOD TEST: CPT

## 2020-04-29 PROCEDURE — U0002 COVID-19 LAB TEST NON-CDC: HCPCS

## 2020-04-29 PROCEDURE — 87040 BLOOD CULTURE FOR BACTERIA: CPT

## 2020-04-29 PROCEDURE — 6370000000 HC RX 637 (ALT 250 FOR IP): Performed by: STUDENT IN AN ORGANIZED HEALTH CARE EDUCATION/TRAINING PROGRAM

## 2020-04-29 PROCEDURE — 80048 BASIC METABOLIC PNL TOTAL CA: CPT

## 2020-04-29 PROCEDURE — 99285 EMERGENCY DEPT VISIT HI MDM: CPT

## 2020-04-29 PROCEDURE — 96361 HYDRATE IV INFUSION ADD-ON: CPT

## 2020-04-29 PROCEDURE — 82010 KETONE BODYS QUAN: CPT

## 2020-04-29 PROCEDURE — 82805 BLOOD GASES W/O2 SATURATION: CPT

## 2020-04-29 PROCEDURE — 96374 THER/PROPH/DIAG INJ IV PUSH: CPT

## 2020-04-29 PROCEDURE — 6360000002 HC RX W HCPCS: Performed by: INTERNAL MEDICINE

## 2020-04-29 PROCEDURE — 93005 ELECTROCARDIOGRAM TRACING: CPT | Performed by: STUDENT IN AN ORGANIZED HEALTH CARE EDUCATION/TRAINING PROGRAM

## 2020-04-29 PROCEDURE — 83735 ASSAY OF MAGNESIUM: CPT

## 2020-04-29 PROCEDURE — 51702 INSERT TEMP BLADDER CATH: CPT

## 2020-04-29 PROCEDURE — 84484 ASSAY OF TROPONIN QUANT: CPT

## 2020-04-29 PROCEDURE — 2580000003 HC RX 258: Performed by: INTERNAL MEDICINE

## 2020-04-29 PROCEDURE — 82800 BLOOD PH: CPT

## 2020-04-29 PROCEDURE — 6360000002 HC RX W HCPCS: Performed by: STUDENT IN AN ORGANIZED HEALTH CARE EDUCATION/TRAINING PROGRAM

## 2020-04-29 PROCEDURE — 80053 COMPREHEN METABOLIC PANEL: CPT

## 2020-04-29 PROCEDURE — 2500000003 HC RX 250 WO HCPCS: Performed by: STUDENT IN AN ORGANIZED HEALTH CARE EDUCATION/TRAINING PROGRAM

## 2020-04-29 PROCEDURE — 2580000003 HC RX 258: Performed by: STUDENT IN AN ORGANIZED HEALTH CARE EDUCATION/TRAINING PROGRAM

## 2020-04-29 PROCEDURE — 83605 ASSAY OF LACTIC ACID: CPT

## 2020-04-29 PROCEDURE — 81001 URINALYSIS AUTO W/SCOPE: CPT

## 2020-04-29 PROCEDURE — 94760 N-INVAS EAR/PLS OXIMETRY 1: CPT

## 2020-04-29 PROCEDURE — 84100 ASSAY OF PHOSPHORUS: CPT

## 2020-04-29 PROCEDURE — 02HV33Z INSERTION OF INFUSION DEVICE INTO SUPERIOR VENA CAVA, PERCUTANEOUS APPROACH: ICD-10-PCS | Performed by: STUDENT IN AN ORGANIZED HEALTH CARE EDUCATION/TRAINING PROGRAM

## 2020-04-29 PROCEDURE — 36556 INSERT NON-TUNNEL CV CATH: CPT

## 2020-04-29 PROCEDURE — 83690 ASSAY OF LIPASE: CPT

## 2020-04-29 PROCEDURE — 36415 COLL VENOUS BLD VENIPUNCTURE: CPT

## 2020-04-29 PROCEDURE — 2000000000 HC ICU R&B

## 2020-04-29 PROCEDURE — 71045 X-RAY EXAM CHEST 1 VIEW: CPT

## 2020-04-29 PROCEDURE — 99223 1ST HOSP IP/OBS HIGH 75: CPT | Performed by: INTERNAL MEDICINE

## 2020-04-29 PROCEDURE — 36592 COLLECT BLOOD FROM PICC: CPT

## 2020-04-29 RX ORDER — DEXTROSE MONOHYDRATE 25 G/50ML
12.5 INJECTION, SOLUTION INTRAVENOUS PRN
Status: DISCONTINUED | OUTPATIENT
Start: 2020-04-29 | End: 2020-05-01 | Stop reason: HOSPADM

## 2020-04-29 RX ORDER — MORPHINE SULFATE 2 MG/ML
1 INJECTION, SOLUTION INTRAMUSCULAR; INTRAVENOUS ONCE
Status: COMPLETED | OUTPATIENT
Start: 2020-04-29 | End: 2020-04-30

## 2020-04-29 RX ORDER — SODIUM CHLORIDE 9 MG/ML
INJECTION, SOLUTION INTRAVENOUS CONTINUOUS
Status: DISCONTINUED | OUTPATIENT
Start: 2020-04-29 | End: 2020-04-29

## 2020-04-29 RX ORDER — 0.9 % SODIUM CHLORIDE 0.9 %
15 INTRAVENOUS SOLUTION INTRAVENOUS ONCE
Status: COMPLETED | OUTPATIENT
Start: 2020-04-29 | End: 2020-04-29

## 2020-04-29 RX ORDER — 0.9 % SODIUM CHLORIDE 0.9 %
15 INTRAVENOUS SOLUTION INTRAVENOUS ONCE
Status: DISCONTINUED | OUTPATIENT
Start: 2020-04-29 | End: 2020-04-29

## 2020-04-29 RX ORDER — 0.9 % SODIUM CHLORIDE 0.9 %
1000 INTRAVENOUS SOLUTION INTRAVENOUS ONCE
Status: COMPLETED | OUTPATIENT
Start: 2020-04-29 | End: 2020-04-29

## 2020-04-29 RX ORDER — DEXTROSE MONOHYDRATE 25 G/50ML
12.5 INJECTION, SOLUTION INTRAVENOUS PRN
Status: DISCONTINUED | OUTPATIENT
Start: 2020-04-29 | End: 2020-04-29

## 2020-04-29 RX ORDER — FENTANYL CITRATE 50 UG/ML
25 INJECTION, SOLUTION INTRAMUSCULAR; INTRAVENOUS ONCE
Status: COMPLETED | OUTPATIENT
Start: 2020-04-29 | End: 2020-04-29

## 2020-04-29 RX ORDER — DEXTROSE AND SODIUM CHLORIDE 5; .45 G/100ML; G/100ML
INJECTION, SOLUTION INTRAVENOUS CONTINUOUS PRN
Status: DISCONTINUED | OUTPATIENT
Start: 2020-04-29 | End: 2020-05-01 | Stop reason: HOSPADM

## 2020-04-29 RX ORDER — POTASSIUM CHLORIDE 7.45 MG/ML
10 INJECTION INTRAVENOUS PRN
Status: DISCONTINUED | OUTPATIENT
Start: 2020-04-29 | End: 2020-04-30

## 2020-04-29 RX ORDER — MAGNESIUM SULFATE 1 G/100ML
1 INJECTION INTRAVENOUS PRN
Status: DISCONTINUED | OUTPATIENT
Start: 2020-04-29 | End: 2020-04-30

## 2020-04-29 RX ORDER — DEXTROSE AND SODIUM CHLORIDE 5; .45 G/100ML; G/100ML
INJECTION, SOLUTION INTRAVENOUS CONTINUOUS PRN
Status: DISCONTINUED | OUTPATIENT
Start: 2020-04-29 | End: 2020-04-29

## 2020-04-29 RX ORDER — MAGNESIUM SULFATE 1 G/100ML
1 INJECTION INTRAVENOUS PRN
Status: DISCONTINUED | OUTPATIENT
Start: 2020-04-29 | End: 2020-04-29

## 2020-04-29 RX ORDER — ACETAMINOPHEN 325 MG/1
650 TABLET ORAL EVERY 4 HOURS PRN
Status: DISCONTINUED | OUTPATIENT
Start: 2020-04-29 | End: 2020-05-01 | Stop reason: HOSPADM

## 2020-04-29 RX ORDER — ONDANSETRON 4 MG/1
4 TABLET, ORALLY DISINTEGRATING ORAL ONCE
Status: COMPLETED | OUTPATIENT
Start: 2020-04-29 | End: 2020-04-29

## 2020-04-29 RX ORDER — POTASSIUM CHLORIDE 7.45 MG/ML
10 INJECTION INTRAVENOUS PRN
Status: DISCONTINUED | OUTPATIENT
Start: 2020-04-29 | End: 2020-04-29

## 2020-04-29 RX ADMIN — INSULIN HUMAN 5 UNITS: 100 INJECTION, SOLUTION PARENTERAL at 17:56

## 2020-04-29 RX ADMIN — FENTANYL CITRATE 25 MCG: 50 INJECTION, SOLUTION INTRAMUSCULAR; INTRAVENOUS at 16:28

## 2020-04-29 RX ADMIN — SODIUM CHLORIDE 0.1 UNITS/KG/HR: 9 INJECTION, SOLUTION INTRAVENOUS at 18:00

## 2020-04-29 RX ADMIN — SODIUM CHLORIDE 1000 ML: 9 INJECTION, SOLUTION INTRAVENOUS at 17:45

## 2020-04-29 RX ADMIN — POTASSIUM CHLORIDE 10 MEQ: 10 INJECTION, SOLUTION INTRAVENOUS at 22:50

## 2020-04-29 RX ADMIN — POTASSIUM CHLORIDE 10 MEQ: 10 INJECTION, SOLUTION INTRAVENOUS at 20:50

## 2020-04-29 RX ADMIN — SODIUM CHLORIDE 1000 ML: 9 INJECTION, SOLUTION INTRAVENOUS at 16:28

## 2020-04-29 RX ADMIN — SODIUM CHLORIDE 687 ML: 9 INJECTION, SOLUTION INTRAVENOUS at 20:49

## 2020-04-29 RX ADMIN — DEXTROSE AND SODIUM CHLORIDE: 5; 450 INJECTION, SOLUTION INTRAVENOUS at 23:05

## 2020-04-29 RX ADMIN — SODIUM CHLORIDE: 9 INJECTION, SOLUTION INTRAVENOUS at 17:54

## 2020-04-29 RX ADMIN — SODIUM BICARBONATE 100 MEQ: 84 INJECTION, SOLUTION INTRAVENOUS at 18:15

## 2020-04-29 RX ADMIN — ONDANSETRON 4 MG: 4 TABLET, ORALLY DISINTEGRATING ORAL at 16:28

## 2020-04-29 RX ADMIN — POTASSIUM CHLORIDE 10 MEQ: 10 INJECTION, SOLUTION INTRAVENOUS at 21:50

## 2020-04-29 RX ADMIN — SODIUM CHLORIDE 1000 ML: 9 INJECTION, SOLUTION INTRAVENOUS at 16:47

## 2020-04-29 RX ADMIN — ENOXAPARIN SODIUM 40 MG: 40 INJECTION SUBCUTANEOUS at 20:50

## 2020-04-29 RX ADMIN — SODIUM BICARBONATE: 84 INJECTION, SOLUTION INTRAVENOUS at 18:00

## 2020-04-29 ASSESSMENT — ENCOUNTER SYMPTOMS
SHORTNESS OF BREATH: 1
ABDOMINAL PAIN: 0
PHOTOPHOBIA: 0
BACK PAIN: 0
EYE PAIN: 0
RHINORRHEA: 0
NAUSEA: 0
VOMITING: 0
CHEST TIGHTNESS: 0
SORE THROAT: 0
APNEA: 0
COUGH: 0
CONSTIPATION: 0
WHEEZING: 0
DIARRHEA: 0
TROUBLE SWALLOWING: 0

## 2020-04-29 ASSESSMENT — PAIN DESCRIPTION - FREQUENCY
FREQUENCY: INTERMITTENT
FREQUENCY: INTERMITTENT

## 2020-04-29 ASSESSMENT — PAIN SCALES - GENERAL
PAINLEVEL_OUTOF10: 9
PAINLEVEL_OUTOF10: 7
PAINLEVEL_OUTOF10: 10

## 2020-04-29 ASSESSMENT — PAIN DESCRIPTION - LOCATION
LOCATION: BACK

## 2020-04-29 ASSESSMENT — PAIN DESCRIPTION - DESCRIPTORS
DESCRIPTORS: ACHING;DULL;DISCOMFORT
DESCRIPTORS: DISCOMFORT
DESCRIPTORS: ACHING
DESCRIPTORS: DISCOMFORT;PENETRATING

## 2020-04-29 ASSESSMENT — PAIN DESCRIPTION - PAIN TYPE
TYPE: ACUTE PAIN

## 2020-04-29 ASSESSMENT — PAIN DESCRIPTION - ORIENTATION
ORIENTATION: LOWER
ORIENTATION: LEFT;RIGHT
ORIENTATION: LOWER

## 2020-04-29 NOTE — PROGRESS NOTES
Pt. Admitted to the ICU, placed on the monitor. Bey catheter placed without incident. Pt. Is on an insulin drip as well as bicarb, labs were drawn and sent. Pt. Complaining of back pain pt. Was medicated for pain in the ER. Pt. Situated in the bed Report given to on coming RN.

## 2020-04-29 NOTE — H&P
ACUTE CARDIOPULMONARY PROCESS             EKG: Tachycardia with no acute ischemic changes    ASSESSMENT:      Active Problems:    DKA, type 1, not at goal [facility])  Resolved Problems:    * No resolved hospital problems. *      PLAN:    1. DKA type I, questioning being triggered by a viral infection. Presented with blood glucose of 910, but hydroxybutyrate greater than 4.5 anion gap of 39 bicarb of 3 with hyperkalemia 5.6. We will start the patient IV normal saline fluids and insulin drip and monitor anion gap, will follow DKA protocol, will consult the intensivist.  2.  Questioning viral infection, suspicious presentation for COVID-19, I will test for COVID-19.  3.  Lactic acidosis due to dehydration secondary to DKA type I, started patient on IV fluids received 2 L bolus in the ED we will proceed with 1/3 L and continue IV fluids, monitor lactic acid. 4.  Leukocytosis, mostly due to dehydration, with a component of reactive leukocytosis secondary to DKA, rule out infectious etiology, UA is not suggestive for UTI, chest x-ray showing no acute cardiopulmonary process, blood cultures were obtained. We will monitor. .  Diabetes type 1, patient supposed to be on Lantus 22 units and Humalog, we will hold for now treating for DKA with insulin drip, will reevaluate once DKA resolved and start patient on her regular dose of Lantus    Code Status: full  DVT prophylaxis: lovenox      NOTE: This report was transcribed using voice recognition software. Every effort was made to ensure accuracy; however, inadvertent computerized transcription errors may be present.   Electronically signed by Florencio Buchanan MD on 4/29/2020 at 5:41 PM

## 2020-04-29 NOTE — ED PROVIDER NOTES
Psychiatric:         Mood and Affect: Mood normal.         Behavior: Behavior normal.          Procedures     Central Line Placement Procedure Note    Indication: poor peripheral access, hypovolemia and need for frequent blood draws    Consent: The patient was counseled regarding the procedure, its indications, risks, potential complications and alternatives, and any questions were answered. Consent was obtained to proceed. Procedure: The patient was positioned appropriately and the skin over the right internal jugular vein was prepped with betadine and draped in a sterile fashion. Local anesthesia was obtained by infiltration using 1% Lidocaine without epinephrine. A large bore needle was used to identify the vein. A guide wire was then inserted into the vein through the needle. A triple lumen catheter was then inserted into the vessel over the guide wire using the Seldinger technique. All ports showed good, free flowing blood return and were flushed with saline solution. The catheter was then securely fastened to the skin with suture. Two sutures were placed into the kit included tube clamp, proximal eyelets and a suture end from each of the securing sutures was extended around the catheter and tied to the proximal eyelets as an added measure to prevent dislodgement. An antibiotic disk was placed and the site was then covered with a sterile dressing. A post procedure X-ray was ordered and is still pending at this time. The patient tolerated the procedure well. Complications: None          MDM   This is a 61-year-old female with a history of poorly controlled type 1 diabetes, previous DKA, who presents to the emergency department tachycardic, hyperglycemia, concerning for DKA. Patient is awake and alert. Tachycardic. Hemodynamically stable. Afebrile. No respiratory distress though has mild tachypnea. Uncomfortable and ill-appearing. Initial blood sugar read as over 600.   Poor vascular access and central line was placed in right IJ without complication, see procedure note above. Fluid resuscitation with 3 L normal saline IV bolus in the emergency department. Venous pH returned at 6.89. Metabolic panel returned with potassium of 5.6. Bicarb is 3. Anion gap of 39. Metabolic acidosis. Beta hydroxybutyrate greater than 4.5. Formal glucose on metabolic panel was 033. Creatinine normal at 0.8. Urinalysis shows ketones in the urine. Consistent with DKA. 5 unit insulin bolus given followed by 0.1 miguel angel per cake per hour insulin infusion consistent with DKA protocol. Administered 2 A of bicarb and then started the patient on bicarb drip at 150 an hour in sterile water per guidance of the intensivist.  Discussed the case with Dr. Manny Henry, intensivist who accepts the patient to the ICU for further management of her DKA. Discussed with Dr. Darcie Myers, hospitalist, who accepts the patient for further evaluation. ED Course as of Apr 29 1853 Wed Apr 29, 2020 1729 Call the lab. Metabolic panel finally shows metabolic acidosis with bicarb of 3. Potassium 5.6. Started on insulin bolus 5 units. Started on insulin weightbase drip 0.1mg/kg/hr. patient already receiving second liter bolus of normal saline. [LM]   1801 Patient remains stable. Awake alert. Receiving bicarb drip in addition to insulin and additional fluids. Awaiting bed up in the ICU. [LM]      ED Course User Index  [LM] Branden Shearer DO      ED Course as of Apr 29 1900 Wed Apr 29, 2020 1729 Call the lab. Metabolic panel finally shows metabolic acidosis with bicarb of 3. Potassium 5.6. Started on insulin bolus 5 units. Started on insulin weightbase drip 0.1mg/kg/hr. patient already receiving second liter bolus of normal saline. [LM]   1801 Patient remains stable. Awake alert. Receiving bicarb drip in addition to insulin and additional fluids. Awaiting bed up in the ICU.     [LM]      ED Course User Index  [LM] Magaly Luu, DO       --------------------------------------------- PAST HISTORY ---------------------------------------------  Past Medical History:  has a past medical history of Bleeding at insertion site, Common femoral artery injury, right, initial encounter, and DM type 1 (diabetes mellitus, type 1) ([facility] [address] Past Surgical History:  has a past surgical history that includes Abdomen surgery (N/A, 5/9/2019) and Bartholin gland cyst excision. Social History:  reports that she has never smoked. She has never used smokeless tobacco. She reports that she does not drink alcohol or use drugs. Family History: family history includes Diabetes in her maternal grandmother and paternal grandmother; Stroke in an other family member. The patients home medications have been reviewed. Allergies: Patient has no known allergies.     -------------------------------------------------- RESULTS -------------------------------------------------    LABS:  Results for orders placed or performed during the hospital encounter of 04/29/20   CBC Auto Differential   Result Value Ref Range    WBC 31.3 (H) 4.5 - 11.5 E9/L    RBC 5.13 3.50 - 5.50 E12/L    Hemoglobin 14.9 11.5 - 15.5 g/dL    Hematocrit 45.9 34.0 - 48.0 %    MCV 89.5 80.0 - 99.9 fL    MCH 29.0 26.0 - 35.0 pg    MCHC 32.5 32.0 - 34.5 %    RDW 12.0 11.5 - 15.0 fL    Platelets 775 720 - 051 E9/L    MPV 11.4 7.0 - 12.0 fL   Comprehensive Metabolic Panel w/ Reflex to MG   Result Value Ref Range    Sodium 133 132 - 146 mmol/L    Potassium reflex Magnesium 5.6 (H) 3.5 - 5.0 mmol/L    Chloride 91 (L) 98 - 107 mmol/L    CO2 3 (LL) 22 - 29 mmol/L    Anion Gap 39 (H) 7 - 16 mmol/L    Glucose 910 (HH) 74 - 99 mg/dL    BUN 29 (H) 6 - 20 mg/dL    CREATININE 0.8 0.5 - 1.0 mg/dL    GFR Non-African American >60 >=60 mL/min/1.73    GFR African American >60     Calcium 10.2 8.6 - 10.2 mg/dL    Total Protein 8.5 (H) 6.4 - 8.3 g/dL    Alb 4.8 3.5 - 5.2 g/dL    Total Bilirubin 0.2 0.0 without coma associated with type 1 diabetes mellitus ([facility] [address]    2. Metabolic acidosis    3. Dehydration    4. Hyperglycemia        Disposition:  Patient's disposition: Admit to CCU/ICU  Patient's condition is serious.             Jeff Shay DO  Resident  04/29/20 3669

## 2020-04-30 LAB
ANION GAP SERPL CALCULATED.3IONS-SCNC: 11 MMOL/L (ref 7–16)
ANION GAP SERPL CALCULATED.3IONS-SCNC: 11 MMOL/L (ref 7–16)
ANION GAP SERPL CALCULATED.3IONS-SCNC: 26 MMOL/L (ref 7–16)
BUN BLDV-MCNC: 10 MG/DL (ref 6–20)
BUN BLDV-MCNC: 12 MG/DL (ref 6–20)
BUN BLDV-MCNC: 16 MG/DL (ref 6–20)
CALCIUM SERPL-MCNC: 7.7 MG/DL (ref 8.6–10.2)
CALCIUM SERPL-MCNC: 8.2 MG/DL (ref 8.6–10.2)
CALCIUM SERPL-MCNC: 8.3 MG/DL (ref 8.6–10.2)
CHLORIDE BLD-SCNC: 103 MMOL/L (ref 98–107)
CHLORIDE BLD-SCNC: 105 MMOL/L (ref 98–107)
CHLORIDE BLD-SCNC: 107 MMOL/L (ref 98–107)
CO2: 22 MMOL/L (ref 22–29)
CO2: 23 MMOL/L (ref 22–29)
CO2: 8 MMOL/L (ref 22–29)
CREAT SERPL-MCNC: 0.4 MG/DL (ref 0.5–1)
CREAT SERPL-MCNC: 0.5 MG/DL (ref 0.5–1)
CREAT SERPL-MCNC: 0.6 MG/DL (ref 0.5–1)
EKG ATRIAL RATE: 133 BPM
EKG P AXIS: 79 DEGREES
EKG P-R INTERVAL: 132 MS
EKG Q-T INTERVAL: 318 MS
EKG QRS DURATION: 72 MS
EKG QTC CALCULATION (BAZETT): 473 MS
EKG R AXIS: 61 DEGREES
EKG T AXIS: 32 DEGREES
EKG VENTRICULAR RATE: 133 BPM
GFR AFRICAN AMERICAN: >60
GFR NON-AFRICAN AMERICAN: >60 ML/MIN/1.73
GLUCOSE BLD-MCNC: 153 MG/DL (ref 74–99)
GLUCOSE BLD-MCNC: 158 MG/DL (ref 74–99)
GLUCOSE BLD-MCNC: 339 MG/DL (ref 74–99)
HCT VFR BLD CALC: 30.5 % (ref 34–48)
HEMOGLOBIN: 10.9 G/DL (ref 11.5–15.5)
MAGNESIUM: 1.5 MG/DL (ref 1.6–2.6)
MAGNESIUM: 1.7 MG/DL (ref 1.6–2.6)
MAGNESIUM: 2 MG/DL (ref 1.6–2.6)
MCH RBC QN AUTO: 29.4 PG (ref 26–35)
MCHC RBC AUTO-ENTMCNC: 35.7 % (ref 32–34.5)
MCV RBC AUTO: 82.2 FL (ref 80–99.9)
METER GLUCOSE: 129 MG/DL (ref 74–99)
METER GLUCOSE: 136 MG/DL (ref 74–99)
METER GLUCOSE: 141 MG/DL (ref 74–99)
METER GLUCOSE: 143 MG/DL (ref 74–99)
METER GLUCOSE: 157 MG/DL (ref 74–99)
METER GLUCOSE: 168 MG/DL (ref 74–99)
METER GLUCOSE: 179 MG/DL (ref 74–99)
METER GLUCOSE: 184 MG/DL (ref 74–99)
METER GLUCOSE: 211 MG/DL (ref 74–99)
METER GLUCOSE: 235 MG/DL (ref 74–99)
METER GLUCOSE: 237 MG/DL (ref 74–99)
METER GLUCOSE: 287 MG/DL (ref 74–99)
METER GLUCOSE: 295 MG/DL (ref 74–99)
PDW BLD-RTO: 11.7 FL (ref 11.5–15)
PHOSPHORUS: 1.5 MG/DL (ref 2.5–4.5)
PHOSPHORUS: 1.7 MG/DL (ref 2.5–4.5)
PHOSPHORUS: 2.2 MG/DL (ref 2.5–4.5)
PLATELET # BLD: 234 E9/L (ref 130–450)
PMV BLD AUTO: 10.7 FL (ref 7–12)
POTASSIUM SERPL-SCNC: 3.5 MMOL/L (ref 3.5–5)
POTASSIUM SERPL-SCNC: 3.5 MMOL/L (ref 3.5–5)
POTASSIUM SERPL-SCNC: 4.4 MMOL/L (ref 3.5–5)
RBC # BLD: 3.71 E12/L (ref 3.5–5.5)
SODIUM BLD-SCNC: 137 MMOL/L (ref 132–146)
SODIUM BLD-SCNC: 139 MMOL/L (ref 132–146)
SODIUM BLD-SCNC: 140 MMOL/L (ref 132–146)
WBC # BLD: 16 E9/L (ref 4.5–11.5)

## 2020-04-30 PROCEDURE — 6360000002 HC RX W HCPCS: Performed by: INTERNAL MEDICINE

## 2020-04-30 PROCEDURE — 1200000000 HC SEMI PRIVATE

## 2020-04-30 PROCEDURE — 82962 GLUCOSE BLOOD TEST: CPT

## 2020-04-30 PROCEDURE — 6370000000 HC RX 637 (ALT 250 FOR IP): Performed by: INTERNAL MEDICINE

## 2020-04-30 PROCEDURE — 36415 COLL VENOUS BLD VENIPUNCTURE: CPT

## 2020-04-30 PROCEDURE — 83735 ASSAY OF MAGNESIUM: CPT

## 2020-04-30 PROCEDURE — 84100 ASSAY OF PHOSPHORUS: CPT

## 2020-04-30 PROCEDURE — 99233 SBSQ HOSP IP/OBS HIGH 50: CPT | Performed by: INTERNAL MEDICINE

## 2020-04-30 PROCEDURE — 2580000003 HC RX 258: Performed by: STUDENT IN AN ORGANIZED HEALTH CARE EDUCATION/TRAINING PROGRAM

## 2020-04-30 PROCEDURE — 93010 ELECTROCARDIOGRAM REPORT: CPT | Performed by: INTERNAL MEDICINE

## 2020-04-30 PROCEDURE — 6370000000 HC RX 637 (ALT 250 FOR IP): Performed by: STUDENT IN AN ORGANIZED HEALTH CARE EDUCATION/TRAINING PROGRAM

## 2020-04-30 PROCEDURE — 36592 COLLECT BLOOD FROM PICC: CPT

## 2020-04-30 PROCEDURE — 2500000003 HC RX 250 WO HCPCS: Performed by: STUDENT IN AN ORGANIZED HEALTH CARE EDUCATION/TRAINING PROGRAM

## 2020-04-30 PROCEDURE — 80048 BASIC METABOLIC PNL TOTAL CA: CPT

## 2020-04-30 PROCEDURE — 85027 COMPLETE CBC AUTOMATED: CPT

## 2020-04-30 RX ORDER — POTASSIUM CHLORIDE 7.45 MG/ML
10 INJECTION INTRAVENOUS ONCE
Status: COMPLETED | OUTPATIENT
Start: 2020-04-30 | End: 2020-04-30

## 2020-04-30 RX ORDER — INSULIN GLARGINE 100 [IU]/ML
25 INJECTION, SOLUTION SUBCUTANEOUS DAILY
Status: DISCONTINUED | OUTPATIENT
Start: 2020-04-30 | End: 2020-05-01

## 2020-04-30 RX ORDER — DEXTROSE MONOHYDRATE 50 MG/ML
100 INJECTION, SOLUTION INTRAVENOUS PRN
Status: DISCONTINUED | OUTPATIENT
Start: 2020-04-30 | End: 2020-05-01 | Stop reason: HOSPADM

## 2020-04-30 RX ORDER — DEXTROSE MONOHYDRATE 25 G/50ML
12.5 INJECTION, SOLUTION INTRAVENOUS PRN
Status: DISCONTINUED | OUTPATIENT
Start: 2020-04-30 | End: 2020-05-01 | Stop reason: HOSPADM

## 2020-04-30 RX ORDER — HYDROCODONE BITARTRATE AND ACETAMINOPHEN 5; 325 MG/1; MG/1
1 TABLET ORAL EVERY 6 HOURS PRN
Status: DISCONTINUED | OUTPATIENT
Start: 2020-04-30 | End: 2020-05-01 | Stop reason: HOSPADM

## 2020-04-30 RX ORDER — NICOTINE POLACRILEX 4 MG
15 LOZENGE BUCCAL PRN
Status: DISCONTINUED | OUTPATIENT
Start: 2020-04-30 | End: 2020-05-01 | Stop reason: HOSPADM

## 2020-04-30 RX ADMIN — MAGNESIUM SULFATE HEPTAHYDRATE 1 G: 1 INJECTION, SOLUTION INTRAVENOUS at 08:04

## 2020-04-30 RX ADMIN — HYDROCODONE BITARTRATE AND ACETAMINOPHEN 1 TABLET: 5; 325 TABLET ORAL at 22:25

## 2020-04-30 RX ADMIN — POTASSIUM CHLORIDE 10 MEQ: 10 INJECTION, SOLUTION INTRAVENOUS at 08:05

## 2020-04-30 RX ADMIN — SODIUM PHOSPHATE, MONOBASIC, MONOHYDRATE 15 MMOL: 276; 142 INJECTION, SOLUTION INTRAVENOUS at 09:28

## 2020-04-30 RX ADMIN — SODIUM PHOSPHATE, MONOBASIC, MONOHYDRATE 10 MMOL: 276; 142 INJECTION, SOLUTION INTRAVENOUS at 03:20

## 2020-04-30 RX ADMIN — INSULIN LISPRO 4 UNITS: 100 INJECTION, SOLUTION INTRAVENOUS; SUBCUTANEOUS at 16:08

## 2020-04-30 RX ADMIN — POTASSIUM CHLORIDE 10 MEQ: 10 INJECTION, SOLUTION INTRAVENOUS at 02:14

## 2020-04-30 RX ADMIN — POTASSIUM CHLORIDE 10 MEQ: 10 INJECTION, SOLUTION INTRAVENOUS at 03:15

## 2020-04-30 RX ADMIN — SODIUM BICARBONATE: 84 INJECTION, SOLUTION INTRAVENOUS at 08:14

## 2020-04-30 RX ADMIN — INSULIN GLARGINE 25 UNITS: 100 INJECTION, SOLUTION SUBCUTANEOUS at 09:16

## 2020-04-30 RX ADMIN — INSULIN LISPRO 1 UNITS: 100 INJECTION, SOLUTION INTRAVENOUS; SUBCUTANEOUS at 21:03

## 2020-04-30 RX ADMIN — SODIUM BICARBONATE: 84 INJECTION, SOLUTION INTRAVENOUS at 01:25

## 2020-04-30 RX ADMIN — ENOXAPARIN SODIUM 40 MG: 40 INJECTION SUBCUTANEOUS at 08:03

## 2020-04-30 RX ADMIN — POTASSIUM CHLORIDE 10 MEQ: 10 INJECTION, SOLUTION INTRAVENOUS at 08:57

## 2020-04-30 RX ADMIN — POTASSIUM CHLORIDE 10 MEQ: 10 INJECTION, SOLUTION INTRAVENOUS at 11:00

## 2020-04-30 RX ADMIN — SODIUM CHLORIDE 8.7 UNITS/HR: 9 INJECTION, SOLUTION INTRAVENOUS at 04:44

## 2020-04-30 RX ADMIN — MORPHINE SULFATE 1 MG: 2 INJECTION, SOLUTION INTRAMUSCULAR; INTRAVENOUS at 00:16

## 2020-04-30 RX ADMIN — HYDROCODONE BITARTRATE AND ACETAMINOPHEN 1 TABLET: 5; 325 TABLET ORAL at 16:07

## 2020-04-30 ASSESSMENT — PAIN DESCRIPTION - ORIENTATION
ORIENTATION: LOWER
ORIENTATION: LOWER;MID
ORIENTATION: LOWER;MID

## 2020-04-30 ASSESSMENT — PAIN DESCRIPTION - LOCATION
LOCATION: BACK

## 2020-04-30 ASSESSMENT — PAIN SCALES - GENERAL
PAINLEVEL_OUTOF10: 0
PAINLEVEL_OUTOF10: 10
PAINLEVEL_OUTOF10: 0
PAINLEVEL_OUTOF10: 10
PAINLEVEL_OUTOF10: 0
PAINLEVEL_OUTOF10: 7
PAINLEVEL_OUTOF10: 8
PAINLEVEL_OUTOF10: 5
PAINLEVEL_OUTOF10: 8

## 2020-04-30 ASSESSMENT — PAIN DESCRIPTION - FREQUENCY
FREQUENCY: INTERMITTENT
FREQUENCY: CONTINUOUS
FREQUENCY: INTERMITTENT
FREQUENCY: CONTINUOUS

## 2020-04-30 ASSESSMENT — PAIN DESCRIPTION - PROGRESSION
CLINICAL_PROGRESSION: NOT CHANGED
CLINICAL_PROGRESSION: NOT CHANGED

## 2020-04-30 ASSESSMENT — PAIN DESCRIPTION - PAIN TYPE
TYPE: ACUTE PAIN
TYPE: CHRONIC PAIN;ACUTE PAIN
TYPE: CHRONIC PAIN
TYPE: CHRONIC PAIN

## 2020-04-30 ASSESSMENT — PAIN DESCRIPTION - DESCRIPTORS
DESCRIPTORS: ACHING;DULL;SORE
DESCRIPTORS: ACHING;DULL
DESCRIPTORS: ACHING;DULL;DISCOMFORT
DESCRIPTORS: ACHING;DULL;SORE

## 2020-04-30 ASSESSMENT — PAIN DESCRIPTION - ONSET
ONSET: ON-GOING
ONSET: ON-GOING

## 2020-04-30 NOTE — CONSULTS
performed by Laura Betancourt MD at [address] [facility]      last yr       SOCIAL AND OCCUPATIONAL HEALTH:  The patient is a Never smoker. No history of alcohol use substance abuse. No travel history reported. Influenza: Not charted  Pneumococcal Polysaccharide: Not indicated      Current Medications   Current Medications    enoxaparin  40 mg Subcutaneous Daily     sodium phosphate IVPB **OR** sodium phosphate IVPB **OR** sodium phosphate IVPB, dextrose, potassium chloride, magnesium sulfate, dextrose 5 % and 0.45 % NaCl, acetaminophen  IV Drips/Infusions   insulin 2.07 Units/hr (04/30/20 0800)    IV infusion builder 150 mL/hr at 04/30/20 0125    dextrose 5 % and 0.45 % NaCl 150 mL/hr at 04/29/20 2305     Home Medications  Medications Prior to Admission: FREESTYLE LITE strip, Checks BS four times a day before meals and at bedtime and also as needed for high and low blood sugar  insulin glargine (BASAGLAR KWIKPEN) 100 UNIT/ML injection pen, Inject 25 Units into the skin nightly  NOVOLOG FLEXPEN 100 UNIT/ML injection pen, Three time a day before meals using carb ratio 1u:10g + sliding scale. MAX 50 units daily  acetone, urine, test strip, Use daily as directed if bs >250 x2 or illness  BD PEN NEEDLE SHELBIE U/F 32G X 4 MM MISC, USE AS DIRECTED UP TO 6 TIMES A DAY    Diet/Nutrition   Diet NPO Effective Now    Allergies   Patient has no known allergies. Social History   Tobacco   reports that she has never smoked. She has never used smokeless tobacco.    Alcohol     reports no history of alcohol use.     Occupational history :    Family History         Problem Relation Age of Onset    Diabetes Maternal Grandmother     Diabetes Paternal Grandmother     Stroke Other     Thyroid Disease Neg Hx        Sleep History   n/a    ROS     REVIEW OF SYSTEMS:  CONSTITUTIONAL:  positive for  fatigue  RESPIRATORY:  positive for  SOB  CARDIOVASCULAR:  negative  GASTROINTESTINAL:  positive for 9146(896.9)   9880(743.4)   OUTPUT   Urine(mL/kg/hr) 1300(3.5)   1300   Shift Total(mL/kg) 1300(27.8)   1300(27.8)   Weight (kg) 46.7 46.7 46.7 46.7     Patient Vitals for the past 96 hrs (Last 3 readings):   Weight   04/29/20 1808 103 lb (46.7 kg)   04/29/20 1717 101 lb (45.8 kg)         Drains/Tubes Outputs  Fully placed 4/29  Exam         PHYSICAL EXAM:  CONSTITUTIONAL:  awake, alert, cooperative, no apparent distress, and appears stated age  EYES:  Lids and lashes normal, pupils equal, round and reactive to light, extra ocular muscles intact, sclera clear, conjunctiva normal  HEMATOLOGIC/LYMPHATICS:  no cervical lymphadenopathy  LUNGS:  No increased work of breathing, good air exchange, clear to auscultation bilaterally, no crackles or wheezing  CARDIOVASCULAR:  Normal apical impulse, regular rate and rhythm, normal S1 and S2, no S3 or S4, and no murmur noted  ABDOMEN:  No scars, normal bowel sounds, soft, non-distended, non-tender, no masses palpated, no hepatosplenomegally  MUSCULOSKELETAL:  There is no redness, warmth, or swelling of the joints. Full range of motion noted. Motor strength is 5 out of 5 all extremities bilaterally. Tone is normal.  NEUROLOGIC:  Awake, alert, oriented to name, place and time. Cranial nerves II-XII are grossly intact. Motor is 5 out of 5 bilaterally. Cerebellar finger to nose, heel to shin intact. Sensory is intact.   Babinski down going, Romberg negative, and gait is normal.    Data   Old records and images have been reviewed    Lab Results   CBC     Lab Results   Component Value Date    WBC 16.0 04/30/2020    RBC 3.71 04/30/2020    HGB 10.9 04/30/2020    HCT 30.5 04/30/2020     04/30/2020    MCV 82.2 04/30/2020    MCH 29.4 04/30/2020    MCHC 35.7 04/30/2020    RDW 11.7 04/30/2020    NRBC 0.0 05/10/2019    SEGSPCT 58 09/29/2013    METASPCT 1.0 04/29/2020    LYMPHOPCT 23.0 04/29/2020    MONOPCT 6.0 04/29/2020    MYELOPCT 1.0 04/29/2020    BASOPCT 0.0 04/29/2020 transfer out of Dina Orourke MD   PGY-1, Resident        Addendum:    Patient with DKA and high anion gap metabolic acidosis. This morning gap has been closed x2. Bridge orders for insulin have been placed. Carb controlled diet place. We will proceed with removing her Bey. Patient overall has not been well compliant with her diabetic regimen and monitoring her blood sugars. Her last hemoglobin A1c a month ago was 15.9. Courage her to continue to keep her appointments with Dr. Meghan Hunter. I personally saw, examined and provided care for the patient. Radiographs, labs and medication list were reviewed by me independently. I spoke with bedside nursing, therapists and consultants. Critical care services and times documented are independent of procedures and multidisciplinary rounds with Residents. Additionally comprehensive, multidisciplinary rounds were conducted with the MICU team. The case was discussed in detail and plans for care were established. Review of Residents documentation was conducted and revisions were made as appropriate. I agree with the above documented exam, problem list and plan of care.   Jose A Ren MD   CCT excluding procedures: 45'

## 2020-04-30 NOTE — PROGRESS NOTES
[facility] Progress Note    Admitting Date and Time: 4/29/2020  2:43 PM  Admit Dx: DKA, type 1, not at goal ([address] [E10.10]  DKA, type 1, not at goal [facility]) [E10.10]    Subjective:  Patient is being followed for DKA, type 1, not at goal [facility]) [E10.10]  DKA, type 1, not at goal [facility]) [E10.10]   Pt feels better, still complaining of back pain    ROS: denies fever, chills, cp, sob, n/v, HA unless stated above     enoxaparin  40 mg Subcutaneous Daily     sodium phosphate IVPB, 10 mmol, PRN    Or  sodium phosphate IVPB, 15 mmol, PRN    Or  sodium phosphate IVPB, 20 mmol, PRN  dextrose, 12.5 g, PRN  potassium chloride, 10 mEq, PRN  magnesium sulfate, 1 g, PRN  dextrose 5 % and 0.45 % NaCl, , Continuous PRN  acetaminophen, 650 mg, Q4H PRN         Objective:    /62   Pulse 125   Temp 98 °F (36.7 °C) (Bladder)   Resp 28   Ht 5' 2\" (1.575 m)   Wt 103 lb (46.7 kg)   SpO2 100%   BMI 18.84 kg/m²     General Appearance: alert and oriented to person, place and time   Skin: warm and dry  Head: normocephalic and atraumatic  Eyes: pupils equal, round, and reactive to light, extraocular eye movements intact, conjunctivae normal  Neck: neck supple and non tender without mass   Pulmonary/Chest: clear to auscultation bilaterally- no wheezes, rales or rhonchi, normal air movement, no respiratory distress  Cardiovascular: normal rate, normal S1 and S2 and no carotid bruits  Abdomen: soft, non-tender, non-distended, normal bowel sounds, no masses or organomegaly, mild bilateral CVA tenderness  Extremities: no cyanosis, no clubbing and no edema  Neurologic: no cranial nerve deficit and speech normal        Recent Labs     04/30/20  0046 04/30/20  0535 04/30/20  0747    140 137   K 4.4 3.5 3.5    107 103   CO2 8* 22 23   BUN 16 12 10   CREATININE 0.6 0.5 0.4*   GLUCOSE 339* 153* 158*   CALCIUM 8.2* 8.3* 7.7*       Recent Labs     04/29/20  1606 04/30/20  0535   WBC 31.3* 16.0*   RBC 5.13 3.71   HGB

## 2020-05-01 ENCOUNTER — APPOINTMENT (OUTPATIENT)
Dept: CT IMAGING | Age: 25
DRG: 420 | End: 2020-05-01
Payer: COMMERCIAL

## 2020-05-01 VITALS
SYSTOLIC BLOOD PRESSURE: 124 MMHG | HEIGHT: 62 IN | WEIGHT: 119 LBS | TEMPERATURE: 98.7 F | BODY MASS INDEX: 21.9 KG/M2 | RESPIRATION RATE: 16 BRPM | HEART RATE: 108 BPM | OXYGEN SATURATION: 97 % | DIASTOLIC BLOOD PRESSURE: 70 MMHG

## 2020-05-01 LAB
ANION GAP SERPL CALCULATED.3IONS-SCNC: 14 MMOL/L (ref 7–16)
BUN BLDV-MCNC: 6 MG/DL (ref 6–20)
CALCIUM SERPL-MCNC: 8.8 MG/DL (ref 8.6–10.2)
CHLORIDE BLD-SCNC: 101 MMOL/L (ref 98–107)
CO2: 22 MMOL/L (ref 22–29)
CREAT SERPL-MCNC: 0.5 MG/DL (ref 0.5–1)
GFR AFRICAN AMERICAN: >60
GFR NON-AFRICAN AMERICAN: >60 ML/MIN/1.73
GLUCOSE BLD-MCNC: 235 MG/DL (ref 74–99)
HCG(URINE) PREGNANCY TEST: NEGATIVE
MAGNESIUM: 1.8 MG/DL (ref 1.6–2.6)
METER GLUCOSE: 174 MG/DL (ref 74–99)
METER GLUCOSE: 225 MG/DL (ref 74–99)
PHOSPHORUS: 3 MG/DL (ref 2.5–4.5)
POTASSIUM SERPL-SCNC: 3.9 MMOL/L (ref 3.5–5)
SODIUM BLD-SCNC: 137 MMOL/L (ref 132–146)

## 2020-05-01 PROCEDURE — 36415 COLL VENOUS BLD VENIPUNCTURE: CPT

## 2020-05-01 PROCEDURE — 36592 COLLECT BLOOD FROM PICC: CPT

## 2020-05-01 PROCEDURE — 2580000003 HC RX 258: Performed by: RADIOLOGY

## 2020-05-01 PROCEDURE — 99239 HOSP IP/OBS DSCHRG MGMT >30: CPT | Performed by: INTERNAL MEDICINE

## 2020-05-01 PROCEDURE — 6360000004 HC RX CONTRAST MEDICATION: Performed by: RADIOLOGY

## 2020-05-01 PROCEDURE — 81025 URINE PREGNANCY TEST: CPT

## 2020-05-01 PROCEDURE — 82962 GLUCOSE BLOOD TEST: CPT

## 2020-05-01 PROCEDURE — 84100 ASSAY OF PHOSPHORUS: CPT

## 2020-05-01 PROCEDURE — 72132 CT LUMBAR SPINE W/DYE: CPT

## 2020-05-01 PROCEDURE — 83735 ASSAY OF MAGNESIUM: CPT

## 2020-05-01 PROCEDURE — 6370000000 HC RX 637 (ALT 250 FOR IP): Performed by: INTERNAL MEDICINE

## 2020-05-01 PROCEDURE — 80048 BASIC METABOLIC PNL TOTAL CA: CPT

## 2020-05-01 RX ORDER — INSULIN GLARGINE 100 [IU]/ML
28 INJECTION, SOLUTION SUBCUTANEOUS NIGHTLY
Qty: 10 PEN | Refills: 3 | Status: ON HOLD | OUTPATIENT
Start: 2020-05-01 | End: 2020-07-12 | Stop reason: ALTCHOICE

## 2020-05-01 RX ORDER — HYDROCODONE BITARTRATE AND ACETAMINOPHEN 5; 325 MG/1; MG/1
1 TABLET ORAL EVERY 6 HOURS PRN
Qty: 12 TABLET | Refills: 0 | Status: ON HOLD | OUTPATIENT
Start: 2020-05-01 | End: 2020-05-07 | Stop reason: HOSPADM

## 2020-05-01 RX ORDER — INSULIN GLARGINE 100 [IU]/ML
28 INJECTION, SOLUTION SUBCUTANEOUS DAILY
Status: DISCONTINUED | OUTPATIENT
Start: 2020-05-02 | End: 2020-05-01 | Stop reason: HOSPADM

## 2020-05-01 RX ORDER — INSULIN GLARGINE 100 [IU]/ML
3 INJECTION, SOLUTION SUBCUTANEOUS ONCE
Status: COMPLETED | OUTPATIENT
Start: 2020-05-01 | End: 2020-05-01

## 2020-05-01 RX ORDER — SODIUM CHLORIDE 0.9 % (FLUSH) 0.9 %
10 SYRINGE (ML) INJECTION PRN
Status: DISCONTINUED | OUTPATIENT
Start: 2020-05-01 | End: 2020-05-01 | Stop reason: HOSPADM

## 2020-05-01 RX ADMIN — INSULIN GLARGINE 3 UNITS: 100 INJECTION, SOLUTION SUBCUTANEOUS at 10:02

## 2020-05-01 RX ADMIN — INSULIN LISPRO 2 UNITS: 100 INJECTION, SOLUTION INTRAVENOUS; SUBCUTANEOUS at 16:51

## 2020-05-01 RX ADMIN — HYDROCODONE BITARTRATE AND ACETAMINOPHEN 1 TABLET: 5; 325 TABLET ORAL at 05:11

## 2020-05-01 RX ADMIN — INSULIN GLARGINE 25 UNITS: 100 INJECTION, SOLUTION SUBCUTANEOUS at 08:33

## 2020-05-01 RX ADMIN — SODIUM CHLORIDE, PRESERVATIVE FREE 10 ML: 5 INJECTION INTRAVENOUS at 12:01

## 2020-05-01 RX ADMIN — HYDROCODONE BITARTRATE AND ACETAMINOPHEN 1 TABLET: 5; 325 TABLET ORAL at 13:40

## 2020-05-01 RX ADMIN — IOPAMIDOL 80 ML: 755 INJECTION, SOLUTION INTRAVENOUS at 12:01

## 2020-05-01 ASSESSMENT — PAIN DESCRIPTION - LOCATION: LOCATION: BACK

## 2020-05-01 ASSESSMENT — PAIN DESCRIPTION - PROGRESSION: CLINICAL_PROGRESSION: NOT CHANGED

## 2020-05-01 ASSESSMENT — PAIN SCALES - GENERAL
PAINLEVEL_OUTOF10: 10
PAINLEVEL_OUTOF10: 7
PAINLEVEL_OUTOF10: 10
PAINLEVEL_OUTOF10: 6
PAINLEVEL_OUTOF10: 0

## 2020-05-01 ASSESSMENT — PAIN DESCRIPTION - DESCRIPTORS: DESCRIPTORS: ACHING;DULL;SORE

## 2020-05-01 ASSESSMENT — PAIN - FUNCTIONAL ASSESSMENT: PAIN_FUNCTIONAL_ASSESSMENT: PREVENTS OR INTERFERES SOME ACTIVE ACTIVITIES AND ADLS

## 2020-05-01 ASSESSMENT — PAIN DESCRIPTION - ORIENTATION: ORIENTATION: LOWER;MID

## 2020-05-01 ASSESSMENT — PAIN DESCRIPTION - PAIN TYPE: TYPE: CHRONIC PAIN

## 2020-05-01 ASSESSMENT — PAIN DESCRIPTION - FREQUENCY: FREQUENCY: CONTINUOUS

## 2020-05-01 ASSESSMENT — PAIN DESCRIPTION - ONSET: ONSET: ON-GOING

## 2020-05-01 NOTE — DISCHARGE SUMMARY
[facility] Physician Discharge Summary       [facility] [id number] [phone]            Activity level: As tolerated     Dispo: home    Condition on discharge: Stable     Patient ID:  Huy York  [id number]  38 y.o.  1995    Admit date: 4/29/2020    Discharge date and time:  5/1/2020  2:39 PM    Admission Diagnoses: Active Problems:    DKA, type 1, not at goal [facility])  Resolved Problems:    * No resolved hospital problems. *      Discharge Diagnoses: Active Problems:    DKA, type 1, not at goal [facility])  Resolved Problems:    * No resolved hospital problems. *      Consults:  IP CONSULT TO CRITICAL CARE  IP CONSULT TO [employer] Course:   Patient Huy York is a 27 y.o. with type I diabetes, presented with nausea, vomiting and abd pain, found to be in DKA, type 1, not at goal ([facility] [address] [E10.10]  DKA, type 1, not at goal [facility]) [E10.10]    1.  DKA type I, questioning being triggered by a viral infection.  Presented with blood glucose of 910, beta hydroxybutyrate greater than 4.5 anion gap of 39 bicarb of 3 with hyperkalemia 5.6.  treated with IV normal saline fluids and insulin drip and anion gap closed. DKA resolved  2.  Questioning viral infection, suspicious presentation for COVID-19, tested negative COVID-19. 3.  Lactic acidosis due to dehydration secondary to DKA type I, started patient on IV fluids received 2 L bolus in the ED we will proceed with 1/3 L and continue IV fluids, monitor lactic acid. 4.  Leukocytosis, mostly due to dehydration, with a component of reactive leukocytosis secondary to DKA, rule out infectious etiology, UA is not suggestive for UTI, chest x-ray showing no acute cardiopulmonary process, blood cultures were negative  5. SIRS criteria, with leukocytosis and tachycardia, ruled out infectious etiology  6.  Diabetes type 1, DKA resolved, increase lantus 28 units and medium dose SSI  7.   Back pain,

## 2020-05-01 NOTE — PATIENT CARE CONFERENCE
P Quality Flow/Interdisciplinary Rounds Progress Note        Quality Flow Rounds held on May 1, 2020    Disciplines Attending:  Bedside Nurse, ,  and Nursing Unit Leadership    Marysol Wells was admitted on 4/29/2020  2:43 PM    Anticipated Discharge Date:       Disposition:    Gavino Score:  Gavino Scale Score: 22    Readmission Risk              Risk of Unplanned Readmission:        19           Discussed patient goal for the day, patient clinical progression, and barriers to discharge.   The following Goal(s) of the Day/Commitment(s) have been identified:  Labs - Report Results      Ellie Melo  May 1, 2020

## 2020-05-04 ENCOUNTER — HOSPITAL ENCOUNTER (INPATIENT)
Age: 25
LOS: 3 days | Discharge: HOME | DRG: 420 | End: 2020-05-07
Attending: EMERGENCY MEDICINE | Admitting: INTERNAL MEDICINE
Payer: COMMERCIAL

## 2020-05-04 PROBLEM — E08.10 DIABETIC KETOACIDOSIS WITHOUT COMA ASSOCIATED WITH DIABETES MELLITUS DUE TO UNDERLYING CONDITION (HCC): Status: ACTIVE | Noted: 2020-05-04

## 2020-05-04 LAB
ALBUMIN SERPL-MCNC: 4.7 G/DL (ref 3.5–5.2)
ALP BLD-CCNC: 104 U/L (ref 35–104)
ALT SERPL-CCNC: 26 U/L (ref 0–32)
ANION GAP SERPL CALCULATED.3IONS-SCNC: 18 MMOL/L (ref 7–16)
ANION GAP SERPL CALCULATED.3IONS-SCNC: 20 MMOL/L (ref 7–16)
ANION GAP SERPL CALCULATED.3IONS-SCNC: 28 MMOL/L (ref 7–16)
ANION GAP SERPL CALCULATED.3IONS-SCNC: 34 MMOL/L (ref 7–16)
AST SERPL-CCNC: 27 U/L (ref 0–31)
BACTERIA: ABNORMAL /HPF
BASOPHILS ABSOLUTE: 0.06 E9/L (ref 0–0.2)
BASOPHILS RELATIVE PERCENT: 0.6 % (ref 0–2)
BETA-HYDROXYBUTYRATE: >4.5 MMOL/L (ref 0.02–0.27)
BILIRUB SERPL-MCNC: 0.2 MG/DL (ref 0–1.2)
BILIRUBIN URINE: NEGATIVE
BLOOD CULTURE, ROUTINE: NORMAL
BLOOD, URINE: ABNORMAL
BUN BLDV-MCNC: 10 MG/DL (ref 6–20)
BUN BLDV-MCNC: 13 MG/DL (ref 6–20)
BUN BLDV-MCNC: 15 MG/DL (ref 6–20)
BUN BLDV-MCNC: 9 MG/DL (ref 6–20)
CALCIUM SERPL-MCNC: 10 MG/DL (ref 8.6–10.2)
CALCIUM SERPL-MCNC: 7.5 MG/DL (ref 8.6–10.2)
CALCIUM SERPL-MCNC: 8.7 MG/DL (ref 8.6–10.2)
CALCIUM SERPL-MCNC: 8.8 MG/DL (ref 8.6–10.2)
CHLORIDE BLD-SCNC: 107 MMOL/L (ref 98–107)
CHLORIDE BLD-SCNC: 108 MMOL/L (ref 98–107)
CHLORIDE BLD-SCNC: 113 MMOL/L (ref 98–107)
CHLORIDE BLD-SCNC: 92 MMOL/L (ref 98–107)
CHP ED QC CHECK: NORMAL
CLARITY: CLEAR
CO2: 4 MMOL/L (ref 22–29)
CO2: 5 MMOL/L (ref 22–29)
CO2: 5 MMOL/L (ref 22–29)
CO2: 8 MMOL/L (ref 22–29)
COLOR: YELLOW
CREAT SERPL-MCNC: 0.4 MG/DL (ref 0.5–1)
CREAT SERPL-MCNC: 0.6 MG/DL (ref 0.5–1)
CREAT SERPL-MCNC: 0.6 MG/DL (ref 0.5–1)
CREAT SERPL-MCNC: 0.7 MG/DL (ref 0.5–1)
EOSINOPHILS ABSOLUTE: 0.01 E9/L (ref 0.05–0.5)
EOSINOPHILS RELATIVE PERCENT: 0.1 % (ref 0–6)
EPITHELIAL CELLS, UA: ABNORMAL /HPF
GFR AFRICAN AMERICAN: >60
GFR NON-AFRICAN AMERICAN: >60 ML/MIN/1.73
GLUCOSE BLD-MCNC: 210 MG/DL (ref 74–99)
GLUCOSE BLD-MCNC: 243 MG/DL (ref 74–99)
GLUCOSE BLD-MCNC: 294 MG/DL (ref 74–99)
GLUCOSE BLD-MCNC: 350 MG/DL
GLUCOSE BLD-MCNC: 450 MG/DL
GLUCOSE BLD-MCNC: 456 MG/DL
GLUCOSE BLD-MCNC: 538 MG/DL (ref 74–99)
GLUCOSE URINE: 500 MG/DL
HCG QUALITATIVE: NEGATIVE
HCG, URINE, POC: NEGATIVE
HCT VFR BLD CALC: 43.4 % (ref 34–48)
HEMOGLOBIN: 14.2 G/DL (ref 11.5–15.5)
IMMATURE GRANULOCYTES #: 0.09 E9/L
IMMATURE GRANULOCYTES %: 0.8 % (ref 0–5)
KETONES, URINE: >=80 MG/DL
LACTIC ACID: 1.6 MMOL/L (ref 0.5–2.2)
LEUKOCYTE ESTERASE, URINE: NEGATIVE
LIPASE: 42 U/L (ref 13–60)
LYMPHOCYTES ABSOLUTE: 2.8 E9/L (ref 1.5–4)
LYMPHOCYTES RELATIVE PERCENT: 25.9 % (ref 20–42)
Lab: NORMAL
MAGNESIUM: 1.5 MG/DL (ref 1.6–2.6)
MAGNESIUM: 1.9 MG/DL (ref 1.6–2.6)
MAGNESIUM: 1.9 MG/DL (ref 1.6–2.6)
MAGNESIUM: 2.4 MG/DL (ref 1.6–2.6)
MCH RBC QN AUTO: 28.9 PG (ref 26–35)
MCHC RBC AUTO-ENTMCNC: 32.7 % (ref 32–34.5)
MCV RBC AUTO: 88.4 FL (ref 80–99.9)
METER GLUCOSE: 131 MG/DL (ref 74–99)
METER GLUCOSE: 162 MG/DL (ref 74–99)
METER GLUCOSE: 177 MG/DL (ref 74–99)
METER GLUCOSE: 179 MG/DL (ref 74–99)
METER GLUCOSE: 186 MG/DL (ref 74–99)
METER GLUCOSE: 214 MG/DL (ref 74–99)
METER GLUCOSE: 235 MG/DL (ref 74–99)
METER GLUCOSE: 237 MG/DL (ref 74–99)
METER GLUCOSE: 265 MG/DL (ref 74–99)
METER GLUCOSE: 350 MG/DL (ref 74–99)
METER GLUCOSE: 450 MG/DL (ref 74–99)
METER GLUCOSE: 451 MG/DL (ref 74–99)
METER GLUCOSE: 456 MG/DL (ref 74–99)
MONOCYTES ABSOLUTE: 0.4 E9/L (ref 0.1–0.95)
MONOCYTES RELATIVE PERCENT: 3.7 % (ref 2–12)
NEGATIVE QC PASS/FAIL: NORMAL
NEUTROPHILS ABSOLUTE: 7.45 E9/L (ref 1.8–7.3)
NEUTROPHILS RELATIVE PERCENT: 68.9 % (ref 43–80)
NITRITE, URINE: NEGATIVE
PDW BLD-RTO: 11.8 FL (ref 11.5–15)
PH UA: 5.5 (ref 5–9)
PH VENOUS: 6.97 (ref 7.35–7.45)
PHOSPHORUS: 2.7 MG/DL (ref 2.5–4.5)
PHOSPHORUS: 2.7 MG/DL (ref 2.5–4.5)
PHOSPHORUS: 3.8 MG/DL (ref 2.5–4.5)
PLATELET # BLD: 301 E9/L (ref 130–450)
PMV BLD AUTO: 10.7 FL (ref 7–12)
POSITIVE QC PASS/FAIL: NORMAL
POTASSIUM REFLEX MAGNESIUM: 5.5 MMOL/L (ref 3.5–5)
POTASSIUM SERPL-SCNC: 4 MMOL/L (ref 3.5–5)
POTASSIUM SERPL-SCNC: 4.7 MMOL/L (ref 3.5–5)
POTASSIUM SERPL-SCNC: 4.7 MMOL/L (ref 3.5–5)
PROTEIN UA: ABNORMAL MG/DL
RBC # BLD: 4.91 E12/L (ref 3.5–5.5)
RBC UA: ABNORMAL /HPF (ref 0–2)
REASON FOR REJECTION: NORMAL
REJECTED TEST: NORMAL
SODIUM BLD-SCNC: 131 MMOL/L (ref 132–146)
SODIUM BLD-SCNC: 134 MMOL/L (ref 132–146)
SODIUM BLD-SCNC: 138 MMOL/L (ref 132–146)
SODIUM BLD-SCNC: 139 MMOL/L (ref 132–146)
SPECIFIC GRAVITY UA: 1.02 (ref 1–1.03)
TOTAL PROTEIN: 8.8 G/DL (ref 6.4–8.3)
UROBILINOGEN, URINE: 0.2 E.U./DL
WBC # BLD: 10.8 E9/L (ref 4.5–11.5)
WBC UA: ABNORMAL /HPF (ref 0–5)
YEAST: PRESENT /HPF

## 2020-05-04 PROCEDURE — 85025 COMPLETE CBC W/AUTO DIFF WBC: CPT

## 2020-05-04 PROCEDURE — 81001 URINALYSIS AUTO W/SCOPE: CPT

## 2020-05-04 PROCEDURE — 82010 KETONE BODYS QUAN: CPT

## 2020-05-04 PROCEDURE — 6360000002 HC RX W HCPCS: Performed by: EMERGENCY MEDICINE

## 2020-05-04 PROCEDURE — 6370000000 HC RX 637 (ALT 250 FOR IP): Performed by: INTERNAL MEDICINE

## 2020-05-04 PROCEDURE — 96365 THER/PROPH/DIAG IV INF INIT: CPT

## 2020-05-04 PROCEDURE — 82962 GLUCOSE BLOOD TEST: CPT

## 2020-05-04 PROCEDURE — 96375 TX/PRO/DX INJ NEW DRUG ADDON: CPT

## 2020-05-04 PROCEDURE — 6370000000 HC RX 637 (ALT 250 FOR IP): Performed by: EMERGENCY MEDICINE

## 2020-05-04 PROCEDURE — 83690 ASSAY OF LIPASE: CPT

## 2020-05-04 PROCEDURE — 36415 COLL VENOUS BLD VENIPUNCTURE: CPT

## 2020-05-04 PROCEDURE — 99223 1ST HOSP IP/OBS HIGH 75: CPT | Performed by: INTERNAL MEDICINE

## 2020-05-04 PROCEDURE — 2580000003 HC RX 258: Performed by: FAMILY MEDICINE

## 2020-05-04 PROCEDURE — 2500000003 HC RX 250 WO HCPCS: Performed by: INTERNAL MEDICINE

## 2020-05-04 PROCEDURE — 2580000003 HC RX 258: Performed by: INTERNAL MEDICINE

## 2020-05-04 PROCEDURE — 6360000002 HC RX W HCPCS: Performed by: INTERNAL MEDICINE

## 2020-05-04 PROCEDURE — 84100 ASSAY OF PHOSPHORUS: CPT

## 2020-05-04 PROCEDURE — 2000000000 HC ICU R&B

## 2020-05-04 PROCEDURE — 80048 BASIC METABOLIC PNL TOTAL CA: CPT

## 2020-05-04 PROCEDURE — 84703 CHORIONIC GONADOTROPIN ASSAY: CPT

## 2020-05-04 PROCEDURE — 82800 BLOOD PH: CPT

## 2020-05-04 PROCEDURE — 83735 ASSAY OF MAGNESIUM: CPT

## 2020-05-04 PROCEDURE — 99285 EMERGENCY DEPT VISIT HI MDM: CPT

## 2020-05-04 PROCEDURE — 2580000003 HC RX 258: Performed by: EMERGENCY MEDICINE

## 2020-05-04 PROCEDURE — 99291 CRITICAL CARE FIRST HOUR: CPT

## 2020-05-04 PROCEDURE — 83605 ASSAY OF LACTIC ACID: CPT

## 2020-05-04 PROCEDURE — 80053 COMPREHEN METABOLIC PANEL: CPT

## 2020-05-04 RX ORDER — 0.9 % SODIUM CHLORIDE 0.9 %
15 INTRAVENOUS SOLUTION INTRAVENOUS ONCE
Status: DISCONTINUED | OUTPATIENT
Start: 2020-05-04 | End: 2020-05-05

## 2020-05-04 RX ORDER — MAGNESIUM SULFATE 1 G/100ML
1 INJECTION INTRAVENOUS PRN
Status: DISCONTINUED | OUTPATIENT
Start: 2020-05-04 | End: 2020-05-05

## 2020-05-04 RX ORDER — 0.9 % SODIUM CHLORIDE 0.9 %
1000 INTRAVENOUS SOLUTION INTRAVENOUS ONCE
Status: COMPLETED | OUTPATIENT
Start: 2020-05-04 | End: 2020-05-04

## 2020-05-04 RX ORDER — DEXTROSE MONOHYDRATE 25 G/50ML
12.5 INJECTION, SOLUTION INTRAVENOUS PRN
Status: DISCONTINUED | OUTPATIENT
Start: 2020-05-04 | End: 2020-05-05

## 2020-05-04 RX ORDER — POTASSIUM CHLORIDE 7.45 MG/ML
10 INJECTION INTRAVENOUS PRN
Status: DISCONTINUED | OUTPATIENT
Start: 2020-05-04 | End: 2020-05-05

## 2020-05-04 RX ORDER — SODIUM CHLORIDE 0.9 % (FLUSH) 0.9 %
10 SYRINGE (ML) INJECTION 2 TIMES DAILY
Status: DISCONTINUED | OUTPATIENT
Start: 2020-05-04 | End: 2020-05-07 | Stop reason: HOSPADM

## 2020-05-04 RX ORDER — DEXTROSE AND SODIUM CHLORIDE 5; .45 G/100ML; G/100ML
INJECTION, SOLUTION INTRAVENOUS CONTINUOUS PRN
Status: DISCONTINUED | OUTPATIENT
Start: 2020-05-04 | End: 2020-05-05

## 2020-05-04 RX ORDER — DEXTROSE, SODIUM CHLORIDE, AND POTASSIUM CHLORIDE 5; .45; .15 G/100ML; G/100ML; G/100ML
INJECTION INTRAVENOUS CONTINUOUS PRN
Status: DISCONTINUED | OUTPATIENT
Start: 2020-05-04 | End: 2020-05-05

## 2020-05-04 RX ORDER — ONDANSETRON 2 MG/ML
4 INJECTION INTRAMUSCULAR; INTRAVENOUS ONCE
Status: COMPLETED | OUTPATIENT
Start: 2020-05-04 | End: 2020-05-04

## 2020-05-04 RX ORDER — 0.9 % SODIUM CHLORIDE 0.9 %
15 INTRAVENOUS SOLUTION INTRAVENOUS ONCE
Status: COMPLETED | OUTPATIENT
Start: 2020-05-04 | End: 2020-05-04

## 2020-05-04 RX ORDER — FENTANYL CITRATE 50 UG/ML
25 INJECTION, SOLUTION INTRAMUSCULAR; INTRAVENOUS ONCE
Status: COMPLETED | OUTPATIENT
Start: 2020-05-04 | End: 2020-05-04

## 2020-05-04 RX ORDER — PEN NEEDLE, DIABETIC 32GX 5/32"
NEEDLE, DISPOSABLE MISCELLANEOUS
Qty: 200 EACH | Refills: 5 | Status: ON HOLD
Start: 2020-05-04 | End: 2020-05-07 | Stop reason: SDUPTHER

## 2020-05-04 RX ORDER — SODIUM CHLORIDE 9 MG/ML
INJECTION, SOLUTION INTRAVENOUS CONTINUOUS
Status: DISCONTINUED | OUTPATIENT
Start: 2020-05-04 | End: 2020-05-05

## 2020-05-04 RX ORDER — HYDROCODONE BITARTRATE AND ACETAMINOPHEN 5; 325 MG/1; MG/1
1 TABLET ORAL EVERY 6 HOURS PRN
Status: DISCONTINUED | OUTPATIENT
Start: 2020-05-04 | End: 2020-05-07 | Stop reason: HOSPADM

## 2020-05-04 RX ORDER — SODIUM CHLORIDE 450 MG/100ML
INJECTION, SOLUTION INTRAVENOUS CONTINUOUS
Status: DISCONTINUED | OUTPATIENT
Start: 2020-05-04 | End: 2020-05-05

## 2020-05-04 RX ADMIN — POTASSIUM CHLORIDE 10 MEQ: 7.46 INJECTION, SOLUTION INTRAVENOUS at 20:42

## 2020-05-04 RX ADMIN — DEXTROSE AND SODIUM CHLORIDE: 5; 450 INJECTION, SOLUTION INTRAVENOUS at 15:40

## 2020-05-04 RX ADMIN — ONDANSETRON 4 MG: 2 INJECTION INTRAMUSCULAR; INTRAVENOUS at 11:36

## 2020-05-04 RX ADMIN — FENTANYL CITRATE 25 MCG: 50 INJECTION, SOLUTION INTRAMUSCULAR; INTRAVENOUS at 13:06

## 2020-05-04 RX ADMIN — HYDROCODONE BITARTRATE AND ACETAMINOPHEN 1 TABLET: 5; 325 TABLET ORAL at 14:18

## 2020-05-04 RX ADMIN — SODIUM CHLORIDE, PRESERVATIVE FREE 10 ML: 5 INJECTION INTRAVENOUS at 14:35

## 2020-05-04 RX ADMIN — MAGNESIUM SULFATE IN DEXTROSE 1 G: 10 INJECTION, SOLUTION INTRAVENOUS at 22:02

## 2020-05-04 RX ADMIN — SODIUM CHLORIDE, PRESERVATIVE FREE 10 ML: 5 INJECTION INTRAVENOUS at 21:00

## 2020-05-04 RX ADMIN — SODIUM CHLORIDE 0.1 UNITS/KG/HR: 9 INJECTION, SOLUTION INTRAVENOUS at 12:07

## 2020-05-04 RX ADMIN — MAGNESIUM SULFATE IN DEXTROSE 1 G: 10 INJECTION, SOLUTION INTRAVENOUS at 20:42

## 2020-05-04 RX ADMIN — DEXTROSE AND SODIUM CHLORIDE: 5; 450 INJECTION, SOLUTION INTRAVENOUS at 22:26

## 2020-05-04 RX ADMIN — SODIUM PHOSPHATE, MONOBASIC, MONOHYDRATE 10 MMOL: 276; 142 INJECTION, SOLUTION INTRAVENOUS at 21:19

## 2020-05-04 RX ADMIN — SODIUM CHLORIDE 810 ML: 9 INJECTION, SOLUTION INTRAVENOUS at 14:30

## 2020-05-04 RX ADMIN — SODIUM CHLORIDE 1000 ML: 9 INJECTION, SOLUTION INTRAVENOUS at 10:57

## 2020-05-04 RX ADMIN — FENTANYL CITRATE 25 MCG: 50 INJECTION, SOLUTION INTRAMUSCULAR; INTRAVENOUS at 11:36

## 2020-05-04 RX ADMIN — HYDROCODONE BITARTRATE AND ACETAMINOPHEN 1 TABLET: 5; 325 TABLET ORAL at 21:18

## 2020-05-04 RX ADMIN — SODIUM CHLORIDE 1000 ML: 9 INJECTION, SOLUTION INTRAVENOUS at 09:42

## 2020-05-04 ASSESSMENT — ENCOUNTER SYMPTOMS
BLOOD IN STOOL: 0
ABDOMINAL DISTENTION: 0
BACK PAIN: 1
NAUSEA: 1
WHEEZING: 0
CHEST TIGHTNESS: 0
RHINORRHEA: 0
COUGH: 0
DIARRHEA: 0
ABDOMINAL PAIN: 0
CONSTIPATION: 0
VOICE CHANGE: 0
SHORTNESS OF BREATH: 1
VOMITING: 1

## 2020-05-04 ASSESSMENT — PAIN DESCRIPTION - LOCATION
LOCATION: BACK

## 2020-05-04 ASSESSMENT — PAIN DESCRIPTION - PROGRESSION: CLINICAL_PROGRESSION: NOT CHANGED

## 2020-05-04 ASSESSMENT — PAIN DESCRIPTION - PAIN TYPE
TYPE: CHRONIC PAIN
TYPE: ACUTE PAIN
TYPE: ACUTE PAIN

## 2020-05-04 ASSESSMENT — PAIN DESCRIPTION - ORIENTATION
ORIENTATION: LOWER
ORIENTATION: LOWER

## 2020-05-04 ASSESSMENT — PAIN DESCRIPTION - DESCRIPTORS: DESCRIPTORS: ACHING

## 2020-05-04 ASSESSMENT — PAIN SCALES - GENERAL
PAINLEVEL_OUTOF10: 0
PAINLEVEL_OUTOF10: 7
PAINLEVEL_OUTOF10: 6
PAINLEVEL_OUTOF10: 10
PAINLEVEL_OUTOF10: 6
PAINLEVEL_OUTOF10: 7

## 2020-05-04 ASSESSMENT — PAIN - FUNCTIONAL ASSESSMENT: PAIN_FUNCTIONAL_ASSESSMENT: PREVENTS OR INTERFERES SOME ACTIVE ACTIVITIES AND ADLS

## 2020-05-04 ASSESSMENT — PAIN DESCRIPTION - FREQUENCY: FREQUENCY: CONTINUOUS

## 2020-05-04 ASSESSMENT — PAIN DESCRIPTION - ONSET: ONSET: ON-GOING

## 2020-05-04 NOTE — ED PROVIDER NOTES
dextrose 50 % IV solution (has no administration in time range)   potassium chloride 10 mEq/100 mL IVPB (Peripheral Line) (has no administration in time range)   magnesium sulfate 1 g in dextrose 5% 100 mL IVPB (has no administration in time range)   sodium phosphate 10 mmol in dextrose 5 % 250 mL IVPB (has no administration in time range)     Or   sodium phosphate 15 mmol in dextrose 5 % 250 mL IVPB (has no administration in time range)     Or   sodium phosphate 20 mmol in dextrose 5 % 500 mL IVPB (has no administration in time range)   HYDROcodone-acetaminophen (NORCO) 5-325 MG per tablet 1 tablet (1 tablet Oral Given 5/4/20 1418)   enoxaparin (LOVENOX) injection 40 mg (40 mg Subcutaneous Not Given 5/4/20 1410)   dextrose 50 % IV solution (has no administration in time range)   potassium chloride 10 mEq/100 mL IVPB (Peripheral Line) (has no administration in time range)   magnesium sulfate 1 g in dextrose 5% 100 mL IVPB (has no administration in time range)   sodium phosphate 10 mmol in dextrose 5 % 250 mL IVPB (has no administration in time range)     Or   sodium phosphate 15 mmol in dextrose 5 % 250 mL IVPB (has no administration in time range)     Or   sodium phosphate 20 mmol in dextrose 5 % 500 mL IVPB (has no administration in time range)   0.9 % sodium chloride bolus (810 mLs Intravenous New Bag 5/4/20 1430)     Followed by   0.45 % sodium chloride infusion (has no administration in time range)   dextrose 5 % and 0.45 % sodium chloride infusion (has no administration in time range)   sodium chloride flush 0.9 % injection 10 mL (10 mLs Intravenous Given 5/4/20 1435)   0.9 % sodium chloride bolus (0 mLs Intravenous Stopped 5/4/20 1146)   0.9 % sodium chloride bolus (0 mLs Intravenous Stopped 5/4/20 1214)   fentaNYL (SUBLIMAZE) injection 25 mcg (25 mcg Intravenous Given 5/4/20 1136)   ondansetron (ZOFRAN) injection 4 mg (4 mg Intravenous Given 5/4/20 1136)   fentaNYL (SUBLIMAZE) injection 25 mcg (25 mcg Intravenous Given 5/4/20 1306)           The cardiac monitor revealed sinus tachycardia with a heart rate in the 120s as interpreted by me. The cardiac monitor was ordered secondary to the patient's hyperglycemia and diabetic ketoacidosis and to monitor the patient for dysrhythmia. CPT [id number]       I, Dr. Broderick Ruth, am the primary provider of record    Medical Decision Making:   Diabetic ketoacidosis, significant hyperglycemia, acidosis as well. IVF bolus, DKA protocol/insulin drip, ICU admission. She declined to have a CVC placed at this time. I spoke with Dr. Alexsandra Chaudhari for ICU admission. I spoke with Dr. Gerardo Rajput for admission. Oxygen Saturation Interpretation: 100 % on RA. Normal      Re-Evaluations:       Some improvement with IVF and meds      This patient's ED course included: a personal history and physicial examination, re-evaluation prior to disposition, multiple bedside re-evaluations, IV medications, cardiac monitoring, continuous pulse oximetry and complex medical decision making and emergency management    This patient has been closely monitored during their ED course. Consultations:  Dr. Alexsandra Ramon Rash:  Please note that the withdrawal or failure to initiate urgent interventions for this patient would likely result in a life threatening deterioration or permanent disability. Accordingly this patient received 30 minutes of critical care time, excluding separately billable procedures. Counseling: The emergency provider has spoken with the patient and discussed todays results, in addition to providing specific details for the plan of care and counseling regarding the diagnosis and prognosis. Questions are answered at this time and they are agreeable with the plan.       --------------------------------- IMPRESSION AND DISPOSITION ---------------------------------    IMPRESSION  1.  Type 1 diabetes mellitus with ketoacidosis without coma [facility]

## 2020-05-04 NOTE — CONSULTS
Critical Care Admit/Consult Note         Patient - Lorena Lopez   MRN -  [id number]   [place] # - [de-identified]   - 1995      Date of Admission -  2020  9:24 AM  Date of evaluation -  2020-A   Hospital Day - 0            ADMIT/CONSULT DETAILS     Reason for Admit/Consult   DKA    Consulting Adarsh Rodríguez MD  Primary Care Physician - Tati Moss DO         HPI   The patient is a 27 y.o. female with significant past medical history of type 1 diabetes mellitus, mild spinal stenosis, with a significant hx of multiple hospitalizations with DKA, non compliance with home Insulin, who presented to ER with nausea, vomiting, generalized weakness, which started today. She is also experiencing shortness of breath. Denies any chest pain, cough, fever/chills, abdominal pain. Patient was recently hospitalized around 1 week ago with DKA, sent home with Glargine 28 UI nightly and Humalog. Patient states that since she was discharged, this whole past week, she has not been taking any Insulin, as she did not have any needles to do so. Appetite has been poor, but has tried to drink fluids. Last admission, pt was found to have mild spinal stenosis. Sent home with norcos. Continues to complain of back pain. In ER she was found to be hyperglycemic 350, anion gap of 34, increased Beta hydroxybutyrate, pH of 6.97 potassium 5.5, pseudohyponatremia. Patient was transferred to ICU for management of DKA.       Past Medical History         Diagnosis Date    Bleeding at insertion site 2018    Common femoral artery injury, right, initial encounter 2018    DM type 1 (diabetes mellitus, type 1) ([facility] [address]         Past Surgical History           Procedure Laterality Date    ABDOMEN SURGERY N/A 2019    INCISION AND DRAINAGE OF SUPRAPUBIC ABSESS performed by Rolo Bullock MD at [address]      last yr       Current Medications   Current

## 2020-05-04 NOTE — ACP (ADVANCE CARE PLANNING)
Advance Care Planning   Advance Care Planning Clinical Specialist  Conversation Note      Date of ACP Conversation: 05/04/2020    ACP Clinical Specialist: PERNELL Tatum      Attempted to complete ACP documentation with pt while in the ED, pt unable to answer this workers questions due to medical issues.     Electronically signed by Allison Liang on 5/7/6749 at 11:11 AM

## 2020-05-04 NOTE — ED NOTES
Bed: 20  Expected date:   Expected time:   Means of arrival:   Comments:  EMS     Romario Alexander RN  05/04/20 0000

## 2020-05-05 ENCOUNTER — APPOINTMENT (OUTPATIENT)
Dept: GENERAL RADIOLOGY | Age: 25
DRG: 420 | End: 2020-05-05
Payer: COMMERCIAL

## 2020-05-05 LAB
ANION GAP SERPL CALCULATED.3IONS-SCNC: 11 MMOL/L (ref 7–16)
ANION GAP SERPL CALCULATED.3IONS-SCNC: 12 MMOL/L (ref 7–16)
ANION GAP SERPL CALCULATED.3IONS-SCNC: 15 MMOL/L (ref 7–16)
ANION GAP SERPL CALCULATED.3IONS-SCNC: 15 MMOL/L (ref 7–16)
BUN BLDV-MCNC: 6 MG/DL (ref 6–20)
BUN BLDV-MCNC: 7 MG/DL (ref 6–20)
BUN BLDV-MCNC: 7 MG/DL (ref 6–20)
BUN BLDV-MCNC: 8 MG/DL (ref 6–20)
CALCIUM SERPL-MCNC: 8 MG/DL (ref 8.6–10.2)
CALCIUM SERPL-MCNC: 8.4 MG/DL (ref 8.6–10.2)
CALCIUM SERPL-MCNC: 8.5 MG/DL (ref 8.6–10.2)
CALCIUM SERPL-MCNC: 8.6 MG/DL (ref 8.6–10.2)
CHLORIDE BLD-SCNC: 110 MMOL/L (ref 98–107)
CHLORIDE BLD-SCNC: 110 MMOL/L (ref 98–107)
CHLORIDE BLD-SCNC: 111 MMOL/L (ref 98–107)
CHLORIDE BLD-SCNC: 111 MMOL/L (ref 98–107)
CO2: 10 MMOL/L (ref 22–29)
CO2: 10 MMOL/L (ref 22–29)
CO2: 11 MMOL/L (ref 22–29)
CO2: 12 MMOL/L (ref 22–29)
CREAT SERPL-MCNC: 0.5 MG/DL (ref 0.5–1)
CREAT SERPL-MCNC: 0.6 MG/DL (ref 0.5–1)
GFR AFRICAN AMERICAN: >60
GFR NON-AFRICAN AMERICAN: >60 ML/MIN/1.73
GLUCOSE BLD-MCNC: 188 MG/DL (ref 74–99)
GLUCOSE BLD-MCNC: 195 MG/DL (ref 74–99)
GLUCOSE BLD-MCNC: 219 MG/DL (ref 74–99)
GLUCOSE BLD-MCNC: 336 MG/DL (ref 74–99)
MAGNESIUM: 1.7 MG/DL (ref 1.6–2.6)
MAGNESIUM: 2.1 MG/DL (ref 1.6–2.6)
MAGNESIUM: 2.3 MG/DL (ref 1.6–2.6)
METER GLUCOSE: 107 MG/DL (ref 74–99)
METER GLUCOSE: 130 MG/DL (ref 74–99)
METER GLUCOSE: 130 MG/DL (ref 74–99)
METER GLUCOSE: 133 MG/DL (ref 74–99)
METER GLUCOSE: 136 MG/DL (ref 74–99)
METER GLUCOSE: 166 MG/DL (ref 74–99)
METER GLUCOSE: 169 MG/DL (ref 74–99)
METER GLUCOSE: 174 MG/DL (ref 74–99)
METER GLUCOSE: 182 MG/DL (ref 74–99)
METER GLUCOSE: 185 MG/DL (ref 74–99)
METER GLUCOSE: 187 MG/DL (ref 74–99)
METER GLUCOSE: 205 MG/DL (ref 74–99)
METER GLUCOSE: 219 MG/DL (ref 74–99)
METER GLUCOSE: 58 MG/DL (ref 74–99)
METER GLUCOSE: 67 MG/DL (ref 74–99)
METER GLUCOSE: 83 MG/DL (ref 74–99)
PHOSPHORUS: 2.4 MG/DL (ref 2.5–4.5)
PHOSPHORUS: 2.7 MG/DL (ref 2.5–4.5)
PHOSPHORUS: 2.9 MG/DL (ref 2.5–4.5)
POTASSIUM SERPL-SCNC: 4.2 MMOL/L (ref 3.5–5)
POTASSIUM SERPL-SCNC: 4.3 MMOL/L (ref 3.5–5)
POTASSIUM SERPL-SCNC: 4.5 MMOL/L (ref 3.5–5)
POTASSIUM SERPL-SCNC: 4.7 MMOL/L (ref 3.5–5)
SODIUM BLD-SCNC: 133 MMOL/L (ref 132–146)
SODIUM BLD-SCNC: 134 MMOL/L (ref 132–146)
SODIUM BLD-SCNC: 135 MMOL/L (ref 132–146)
SODIUM BLD-SCNC: 136 MMOL/L (ref 132–146)

## 2020-05-05 PROCEDURE — 36415 COLL VENOUS BLD VENIPUNCTURE: CPT

## 2020-05-05 PROCEDURE — 2580000003 HC RX 258: Performed by: INTERNAL MEDICINE

## 2020-05-05 PROCEDURE — 6370000000 HC RX 637 (ALT 250 FOR IP): Performed by: INTERNAL MEDICINE

## 2020-05-05 PROCEDURE — 6360000002 HC RX W HCPCS: Performed by: INTERNAL MEDICINE

## 2020-05-05 PROCEDURE — 82962 GLUCOSE BLOOD TEST: CPT

## 2020-05-05 PROCEDURE — 2580000003 HC RX 258: Performed by: FAMILY MEDICINE

## 2020-05-05 PROCEDURE — 99233 SBSQ HOSP IP/OBS HIGH 50: CPT | Performed by: INTERNAL MEDICINE

## 2020-05-05 PROCEDURE — 80048 BASIC METABOLIC PNL TOTAL CA: CPT

## 2020-05-05 PROCEDURE — 2500000003 HC RX 250 WO HCPCS: Performed by: INTERNAL MEDICINE

## 2020-05-05 PROCEDURE — 84100 ASSAY OF PHOSPHORUS: CPT

## 2020-05-05 PROCEDURE — 1200000000 HC SEMI PRIVATE

## 2020-05-05 PROCEDURE — 71045 X-RAY EXAM CHEST 1 VIEW: CPT

## 2020-05-05 PROCEDURE — 83735 ASSAY OF MAGNESIUM: CPT

## 2020-05-05 PROCEDURE — 36556 INSERT NON-TUNNEL CV CATH: CPT

## 2020-05-05 PROCEDURE — 05HN33Z INSERTION OF INFUSION DEVICE INTO LEFT INTERNAL JUGULAR VEIN, PERCUTANEOUS APPROACH: ICD-10-PCS | Performed by: INTERNAL MEDICINE

## 2020-05-05 RX ORDER — NICOTINE POLACRILEX 4 MG
15 LOZENGE BUCCAL PRN
Status: DISCONTINUED | OUTPATIENT
Start: 2020-05-05 | End: 2020-05-07 | Stop reason: HOSPADM

## 2020-05-05 RX ORDER — POTASSIUM CHLORIDE 29.8 MG/ML
20 INJECTION INTRAVENOUS PRN
Status: DISCONTINUED | OUTPATIENT
Start: 2020-05-05 | End: 2020-05-05

## 2020-05-05 RX ORDER — DEXTROSE MONOHYDRATE 50 MG/ML
100 INJECTION, SOLUTION INTRAVENOUS PRN
Status: DISCONTINUED | OUTPATIENT
Start: 2020-05-05 | End: 2020-05-07 | Stop reason: HOSPADM

## 2020-05-05 RX ORDER — INSULIN GLARGINE 100 [IU]/ML
28 INJECTION, SOLUTION SUBCUTANEOUS DAILY
Status: DISCONTINUED | OUTPATIENT
Start: 2020-05-05 | End: 2020-05-07 | Stop reason: HOSPADM

## 2020-05-05 RX ORDER — DEXTROSE MONOHYDRATE 25 G/50ML
12.5 INJECTION, SOLUTION INTRAVENOUS PRN
Status: DISCONTINUED | OUTPATIENT
Start: 2020-05-05 | End: 2020-05-07 | Stop reason: HOSPADM

## 2020-05-05 RX ADMIN — INSULIN GLARGINE 28 UNITS: 100 INJECTION, SOLUTION SUBCUTANEOUS at 12:31

## 2020-05-05 RX ADMIN — POTASSIUM CHLORIDE 10 MEQ: 7.46 INJECTION, SOLUTION INTRAVENOUS at 06:51

## 2020-05-05 RX ADMIN — HYDROCODONE BITARTRATE AND ACETAMINOPHEN 1 TABLET: 5; 325 TABLET ORAL at 08:00

## 2020-05-05 RX ADMIN — SODIUM PHOSPHATE, MONOBASIC, MONOHYDRATE 10 MMOL: 276; 142 INJECTION, SOLUTION INTRAVENOUS at 04:22

## 2020-05-05 RX ADMIN — DEXTROSE 15 G: 15 GEL ORAL at 16:11

## 2020-05-05 RX ADMIN — SODIUM CHLORIDE, PRESERVATIVE FREE 10 ML: 5 INJECTION INTRAVENOUS at 20:56

## 2020-05-05 RX ADMIN — INSULIN LISPRO 4 UNITS: 100 INJECTION, SOLUTION INTRAVENOUS; SUBCUTANEOUS at 12:27

## 2020-05-05 RX ADMIN — DEXTROSE AND SODIUM CHLORIDE: 5; 450 INJECTION, SOLUTION INTRAVENOUS at 04:52

## 2020-05-05 RX ADMIN — HYDROCODONE BITARTRATE AND ACETAMINOPHEN 1 TABLET: 5; 325 TABLET ORAL at 23:22

## 2020-05-05 RX ADMIN — SODIUM PHOSPHATE, MONOBASIC, MONOHYDRATE 10 MMOL: 276; 142 INJECTION, SOLUTION INTRAVENOUS at 10:06

## 2020-05-05 RX ADMIN — POTASSIUM CHLORIDE 20 MEQ: 29.8 INJECTION, SOLUTION INTRAVENOUS at 09:31

## 2020-05-05 RX ADMIN — POTASSIUM CHLORIDE 10 MEQ: 7.46 INJECTION, SOLUTION INTRAVENOUS at 04:28

## 2020-05-05 RX ADMIN — HYDROCODONE BITARTRATE AND ACETAMINOPHEN 1 TABLET: 5; 325 TABLET ORAL at 13:59

## 2020-05-05 RX ADMIN — DEXTROSE MONOHYDRATE 100 ML/HR: 50 INJECTION, SOLUTION INTRAVENOUS at 16:39

## 2020-05-05 RX ADMIN — SODIUM CHLORIDE, PRESERVATIVE FREE 10 ML: 5 INJECTION INTRAVENOUS at 10:00

## 2020-05-05 RX ADMIN — POTASSIUM CHLORIDE 10 MEQ: 7.46 INJECTION, SOLUTION INTRAVENOUS at 02:26

## 2020-05-05 RX ADMIN — POTASSIUM CHLORIDE 20 MEQ: 29.8 INJECTION, SOLUTION INTRAVENOUS at 11:00

## 2020-05-05 ASSESSMENT — PAIN DESCRIPTION - PAIN TYPE
TYPE: CHRONIC PAIN

## 2020-05-05 ASSESSMENT — PAIN DESCRIPTION - DESCRIPTORS
DESCRIPTORS: ACHING;DISCOMFORT
DESCRIPTORS: ACHING
DESCRIPTORS: ACHING;DISCOMFORT

## 2020-05-05 ASSESSMENT — PAIN DESCRIPTION - PROGRESSION
CLINICAL_PROGRESSION: NOT CHANGED

## 2020-05-05 ASSESSMENT — PAIN SCALES - GENERAL
PAINLEVEL_OUTOF10: 7
PAINLEVEL_OUTOF10: 4
PAINLEVEL_OUTOF10: 0
PAINLEVEL_OUTOF10: 7
PAINLEVEL_OUTOF10: 7
PAINLEVEL_OUTOF10: 9
PAINLEVEL_OUTOF10: 0
PAINLEVEL_OUTOF10: 0

## 2020-05-05 ASSESSMENT — PAIN DESCRIPTION - ORIENTATION
ORIENTATION: LOWER
ORIENTATION: MID;LOWER
ORIENTATION: LOWER;MID

## 2020-05-05 ASSESSMENT — PAIN DESCRIPTION - ONSET
ONSET: AWAKENED FROM SLEEP
ONSET: ON-GOING
ONSET: ON-GOING

## 2020-05-05 ASSESSMENT — PAIN DESCRIPTION - FREQUENCY
FREQUENCY: INTERMITTENT
FREQUENCY: CONTINUOUS
FREQUENCY: CONTINUOUS

## 2020-05-05 ASSESSMENT — PAIN - FUNCTIONAL ASSESSMENT
PAIN_FUNCTIONAL_ASSESSMENT: PREVENTS OR INTERFERES SOME ACTIVE ACTIVITIES AND ADLS
PAIN_FUNCTIONAL_ASSESSMENT: ACTIVITIES ARE NOT PREVENTED
PAIN_FUNCTIONAL_ASSESSMENT: PREVENTS OR INTERFERES SOME ACTIVE ACTIVITIES AND ADLS

## 2020-05-05 ASSESSMENT — PAIN DESCRIPTION - LOCATION
LOCATION: BACK

## 2020-05-05 NOTE — PROGRESS NOTES
--   --   --    ALT 26  --   --   --   --     < > = values in this interval not displayed. Magnesium:   Lab Results   Component Value Date    MG 1.7 05/05/2020     Phosphorus:   Lab Results   Component Value Date    PHOS 2.9 05/05/2020     Ionized Calcium: No results found for: CAION     Urinalysis:     Troponin: No results for input(s): TROPONINI in the last 72 hours. Microbiology:    Cultures during this admission:     Blood cultures:                 [x] None drawn      [] Negative             []  Positive (Details:  )  Urine Culture:                   [x] None drawn      [] Negative             []  Positive (Details:  )  Sputum Culture:               [x] None drawn       [] Negative             []  Positive (Details:  )   Endotracheal aspirate:     [x] None drawn       [] Negative             []  Positive (Details:  )     Other pertinent Labs:       Radiology/Imaging:   XR CHEST PORTABLE   Final Result   Left-sided central venous catheter terminating in the lower SVC. No   unexpected findings. Chest Xray (5/5/2020):     ASSESSMENT:     Patient Active Problem List    Diagnosis Date Noted    Diabetic ketoacidosis without coma associated with diabetes mellitus due to underlying condition ([facility] [address] 05/04/2020    Acidosis     Hypokalemia     Pyelonephritis     Hyperglycemia 03/06/2020    Leukocytosis     Elevated lactic acid level     DKA, type 1, not at goal [facility]) 10/28/2019    Diabetes mellitus type 1, uncontrolled ([facility] [address] 11/12/2018    Personal history of noncompliance with medical treatment, presenting hazards to health 11/12/2018       Additional assessment:    Neuro   Alert and awake.  Oriented x3         Respiratory   Stable in RA, saturating 100 %  XR unremarkable  Tested for COVID on 4/29- negative     Cardiovascular   Stable vital signs  Tachycardic in the sight of DKA  Monitor blood pressures        Gastrointestinal   No issues  GI prophylaxis Protonix     Renal   Monitor

## 2020-05-05 NOTE — PROGRESS NOTES
[facility]    Admitting Date and Time: 5/4/2020  9:24 AM  Admit Dx: Diabetic ketoacidosis without coma associated with diabetes mellitus due to underlying condition ([address] [E08.10]  Diabetic ketoacidosis without coma associated with diabetes mellitus due to underlying condition ([address] [E08.10]  Diabetic ketoacidosis without coma associated with diabetes mellitus due to underlying condition ([address] [E08.10]    Subjective:  Patient is being followed for Diabetic ketoacidosis without coma associated with diabetes mellitus due to underlying condition ([address] [E08.10]  Diabetic ketoacidosis without coma associated with diabetes mellitus due to underlying condition ([facility] [address] [E08.10]  Diabetic ketoacidosis without coma associated with diabetes mellitus due to underlying condition ([address] [E08.10]     The patient states she is feeling a lot better. She is tolerating liquids.      insulin glargine  28 Units Subcutaneous Daily    insulin lispro  0-12 Units Subcutaneous TID WC    insulin lispro  0-6 Units Subcutaneous Nightly    enoxaparin  40 mg Subcutaneous Daily    sodium chloride flush  10 mL Intravenous BID     glucose, 15 g, PRN  dextrose, 12.5 g, PRN  glucagon (rDNA), 1 mg, PRN  dextrose, 100 mL/hr, PRN  HYDROcodone-acetaminophen, 1 tablet, Q6H PRN         Objective:    /83   Pulse 120   Temp 98.2 °F (36.8 °C) (Oral)   Resp 14   Ht 5' 2\" (1.575 m)   Wt 119 lb 0.8 oz (54 kg)   SpO2 100%   BMI 21.77 kg/m²     Gen: NAD, AAOx3, sitting in bed  HEENT: NCAT  CV: Tachycardia, no m/r/g  Resp: Equal chest rise b/l, CTAB  Abd: Soft, NT, ND  Ext: Good ROM of b/l upper and lower extremities, no BLE edema    Recent Labs     05/05/20  0215 05/05/20  0530 05/05/20  0925    136  135 133   K 4.7 4.3  4.2 4.5   * 111*  110* 110*   CO2 11* 10*  10* 12*   BUN 8 7  7 6   CREATININE 0.6 0.5  0.5 0.5   GLUCOSE 195* 219*  336* 188*   CALCIUM 8.5* 8.6  8.0* 8.4*       Recent Labs

## 2020-05-05 NOTE — CARE COORDINATION
Spoke w/ patient. Explained role of . Lives w/ grandmother. Independent PTA. PCP is Dr. Luis Brooks. Endocrinologist is Dr. Dimitry Jung. Pharmacy is [facility]. Reports she ran out of syringes- will need script on discharge. States she has a glucometer and all other diabetic testing supplies. Declining diabetic education classes.  Per pt, plan is to return home on discharge- states no needs other than syringes Laurent Peter

## 2020-05-06 LAB
ANION GAP SERPL CALCULATED.3IONS-SCNC: 11 MMOL/L (ref 7–16)
BASOPHILS ABSOLUTE: 0.02 E9/L (ref 0–0.2)
BASOPHILS RELATIVE PERCENT: 0.3 % (ref 0–2)
BUN BLDV-MCNC: 4 MG/DL (ref 6–20)
CALCIUM SERPL-MCNC: 9.1 MG/DL (ref 8.6–10.2)
CHLORIDE BLD-SCNC: 105 MMOL/L (ref 98–107)
CO2: 16 MMOL/L (ref 22–29)
CREAT SERPL-MCNC: 0.6 MG/DL (ref 0.5–1)
EOSINOPHILS ABSOLUTE: 0.05 E9/L (ref 0.05–0.5)
EOSINOPHILS RELATIVE PERCENT: 0.7 % (ref 0–6)
GFR AFRICAN AMERICAN: >60
GFR NON-AFRICAN AMERICAN: >60 ML/MIN/1.73
GLUCOSE BLD-MCNC: 137 MG/DL (ref 74–99)
HCT VFR BLD CALC: 33 % (ref 34–48)
HEMOGLOBIN: 11.7 G/DL (ref 11.5–15.5)
IMMATURE GRANULOCYTES #: 0.03 E9/L
IMMATURE GRANULOCYTES %: 0.4 % (ref 0–5)
LYMPHOCYTES ABSOLUTE: 2.7 E9/L (ref 1.5–4)
LYMPHOCYTES RELATIVE PERCENT: 35.5 % (ref 20–42)
MAGNESIUM: 1.8 MG/DL (ref 1.6–2.6)
MCH RBC QN AUTO: 29.2 PG (ref 26–35)
MCHC RBC AUTO-ENTMCNC: 35.5 % (ref 32–34.5)
MCV RBC AUTO: 82.3 FL (ref 80–99.9)
METER GLUCOSE: 142 MG/DL (ref 74–99)
METER GLUCOSE: 156 MG/DL (ref 74–99)
METER GLUCOSE: 79 MG/DL (ref 74–99)
METER GLUCOSE: 96 MG/DL (ref 74–99)
METER GLUCOSE: 96 MG/DL (ref 74–99)
MONOCYTES ABSOLUTE: 0.62 E9/L (ref 0.1–0.95)
MONOCYTES RELATIVE PERCENT: 8.1 % (ref 2–12)
NEUTROPHILS ABSOLUTE: 4.19 E9/L (ref 1.8–7.3)
NEUTROPHILS RELATIVE PERCENT: 55 % (ref 43–80)
PDW BLD-RTO: 12.2 FL (ref 11.5–15)
PHOSPHORUS: 2.3 MG/DL (ref 2.5–4.5)
PLATELET # BLD: 255 E9/L (ref 130–450)
PMV BLD AUTO: 9.8 FL (ref 7–12)
POTASSIUM SERPL-SCNC: 4 MMOL/L (ref 3.5–5)
RBC # BLD: 4.01 E12/L (ref 3.5–5.5)
SODIUM BLD-SCNC: 132 MMOL/L (ref 132–146)
WBC # BLD: 7.6 E9/L (ref 4.5–11.5)

## 2020-05-06 PROCEDURE — 6370000000 HC RX 637 (ALT 250 FOR IP): Performed by: INTERNAL MEDICINE

## 2020-05-06 PROCEDURE — 36415 COLL VENOUS BLD VENIPUNCTURE: CPT

## 2020-05-06 PROCEDURE — 99233 SBSQ HOSP IP/OBS HIGH 50: CPT | Performed by: INTERNAL MEDICINE

## 2020-05-06 PROCEDURE — 82962 GLUCOSE BLOOD TEST: CPT

## 2020-05-06 PROCEDURE — 83735 ASSAY OF MAGNESIUM: CPT

## 2020-05-06 PROCEDURE — 85025 COMPLETE CBC W/AUTO DIFF WBC: CPT

## 2020-05-06 PROCEDURE — APPSS30 APP SPLIT SHARED TIME 16-30 MINUTES: Performed by: CLINICAL NURSE SPECIALIST

## 2020-05-06 PROCEDURE — 1200000000 HC SEMI PRIVATE

## 2020-05-06 PROCEDURE — 2580000003 HC RX 258: Performed by: INTERNAL MEDICINE

## 2020-05-06 PROCEDURE — 84100 ASSAY OF PHOSPHORUS: CPT

## 2020-05-06 PROCEDURE — 80048 BASIC METABOLIC PNL TOTAL CA: CPT

## 2020-05-06 PROCEDURE — 36592 COLLECT BLOOD FROM PICC: CPT

## 2020-05-06 RX ADMIN — SODIUM CHLORIDE, PRESERVATIVE FREE 10 ML: 5 INJECTION INTRAVENOUS at 08:23

## 2020-05-06 RX ADMIN — INSULIN LISPRO 2 UNITS: 100 INJECTION, SOLUTION INTRAVENOUS; SUBCUTANEOUS at 11:54

## 2020-05-06 RX ADMIN — SODIUM CHLORIDE, PRESERVATIVE FREE 10 ML: 5 INJECTION INTRAVENOUS at 11:31

## 2020-05-06 RX ADMIN — HYDROCODONE BITARTRATE AND ACETAMINOPHEN 1 TABLET: 5; 325 TABLET ORAL at 06:10

## 2020-05-06 RX ADMIN — INSULIN LISPRO 1 UNITS: 100 INJECTION, SOLUTION INTRAVENOUS; SUBCUTANEOUS at 20:36

## 2020-05-06 RX ADMIN — INSULIN GLARGINE 28 UNITS: 100 INJECTION, SOLUTION SUBCUTANEOUS at 08:19

## 2020-05-06 RX ADMIN — HYDROCODONE BITARTRATE AND ACETAMINOPHEN 1 TABLET: 5; 325 TABLET ORAL at 19:45

## 2020-05-06 RX ADMIN — SODIUM CHLORIDE, PRESERVATIVE FREE 10 ML: 5 INJECTION INTRAVENOUS at 22:07

## 2020-05-06 ASSESSMENT — PAIN DESCRIPTION - PROGRESSION: CLINICAL_PROGRESSION: NOT CHANGED

## 2020-05-06 ASSESSMENT — PAIN DESCRIPTION - LOCATION
LOCATION: BACK
LOCATION: BACK

## 2020-05-06 ASSESSMENT — PAIN DESCRIPTION - PAIN TYPE
TYPE: CHRONIC PAIN
TYPE: CHRONIC PAIN

## 2020-05-06 ASSESSMENT — PAIN DESCRIPTION - DESCRIPTORS
DESCRIPTORS: ACHING;DISCOMFORT
DESCRIPTORS: ACHING;DISCOMFORT

## 2020-05-06 ASSESSMENT — PAIN DESCRIPTION - ORIENTATION
ORIENTATION: LOWER;MID
ORIENTATION: LOWER;MID

## 2020-05-06 ASSESSMENT — PAIN SCALES - GENERAL
PAINLEVEL_OUTOF10: 7
PAINLEVEL_OUTOF10: 8
PAINLEVEL_OUTOF10: 9
PAINLEVEL_OUTOF10: 6

## 2020-05-06 ASSESSMENT — PAIN - FUNCTIONAL ASSESSMENT: PAIN_FUNCTIONAL_ASSESSMENT: ACTIVITIES ARE NOT PREVENTED

## 2020-05-06 ASSESSMENT — PAIN DESCRIPTION - ONSET: ONSET: AWAKENED FROM SLEEP

## 2020-05-06 ASSESSMENT — PAIN DESCRIPTION - FREQUENCY: FREQUENCY: INTERMITTENT

## 2020-05-06 NOTE — PROGRESS NOTES
Went to flush peripheral IV that was placed by IV team prior to pulling TLC and patient stated that it hurt very bad to flush site. IV nurse on floor and assessed site and the site was not good. IV nurse said that she offered to look for another peripheral IV site but patient declined and wanted the TLC to be left in place.

## 2020-05-06 NOTE — PATIENT CARE CONFERENCE
[facility] Quality Flow/Interdisciplinary Rounds Progress Note        Quality Flow Rounds held on May 6, 2020    Disciplines Attending:  ,  and Nursing Unit Leadership    Aleksandra Reason was admitted on 5/4/2020  9:24 AM    Anticipated Discharge Date:  Expected Discharge Date: 05/06/20    Disposition:    Gavino Score:  Gavino Scale Score: 22    Readmission Risk              Risk of Unplanned Readmission:        20           Discussed patient goal for the day, patient clinical progression, and barriers to discharge.   The following Goal(s) of the Day/Commitment(s) have been identified:  Discharge - Obtain Order- needs supplies      Freedom Lopez  May 6, 2020

## 2020-05-06 NOTE — PROGRESS NOTES
[facility] Progress Note    Admitting Date and Time: 5/4/2020  9:24 AM  Admit Dx: Diabetic ketoacidosis without coma associated with diabetes mellitus due to underlying condition ([address] [E08.10]  Diabetic ketoacidosis without coma associated with diabetes mellitus due to underlying condition ([address] [E08.10]  Diabetic ketoacidosis without coma associated with diabetes mellitus due to underlying condition ([address] [E08.10]    Subjective:  Patient is being followed for Diabetic ketoacidosis without coma associated with diabetes mellitus due to underlying condition ([address] [E08.10]  Diabetic ketoacidosis without coma associated with diabetes mellitus due to underlying condition ([address] [E08.10]  Diabetic ketoacidosis without coma associated with diabetes mellitus due to underlying condition ([address] [E08.10]   Pt feels feels well up walking around room, no nausea vomiting diarrhea no fever chills. Occasionally little bit lightheaded she stated but otherwise no changes. Tolerating diet. Per RN: No adverse reactions we will try to place a peripheral site before pulling tlc    ROS: denies fever, chills, cp, sob, n/v, HA unless stated above.       insulin glargine  28 Units Subcutaneous Daily    insulin lispro  0-12 Units Subcutaneous TID WC    insulin lispro  0-6 Units Subcutaneous Nightly    enoxaparin  40 mg Subcutaneous Daily    sodium chloride flush  10 mL Intravenous BID     glucose, 15 g, PRN  dextrose, 12.5 g, PRN  glucagon (rDNA), 1 mg, PRN  dextrose, 100 mL/hr, PRN  HYDROcodone-acetaminophen, 1 tablet, Q6H PRN         Objective:    /85   Pulse 116   Temp 98.9 °F (37.2 °C) (Oral)   Resp 16   Ht 5' 2\" (1.575 m)   Wt 110 lb (49.9 kg)   SpO2 100%   BMI 20.12 kg/m²     General Appearance: alert and oriented to person, place and time and in no acute distress  Skin: warm and dry  Head: normocephalic and atraumatic  Eyes: pupils equal, round, and reactive to light, extraocular eye movements intact, conjunctivae normal  Neck: neck supple and non tender without mass   Pulmonary/Chest: clear to auscultation bilaterally- no wheezes, rales or rhonchi, normal air movement, no respiratory distress  Cardiovascular: normal rate, normal S1 and S2 and no carotid bruits  Abdomen: soft, non-tender, non-distended, normal bowel sounds, no masses or organomegaly  Extremities: no cyanosis, no clubbing and no edema  Neurologic: no cranial nerve deficit and speech normal  Left neck-jugular TLC dry dressing      Recent Labs     05/05/20  0530 05/05/20  0925 05/06/20  0407     135 133 132   K 4.3  4.2 4.5 4.0   *  110* 110* 105   CO2 10*  10* 12* 16*   BUN 7  7 6 4*   CREATININE 0.5  0.5 0.5 0.6   GLUCOSE 219*  336* 188* 137*   CALCIUM 8.6  8.0* 8.4* 9.1       Recent Labs     05/04/20  0934   WBC 10.8   RBC 4.91   HGB 14.2   HCT 43.4   MCV 88.4   MCH 28.9   MCHC 32.7   RDW 11.8      MPV 10.7       Radiology:   Chest x-ray 5/5/2020  Impression:        Left-sided central venous catheter terminating in the lower SVC. No  unexpected findings. Assessment:    Active Problems:    Diabetic ketoacidosis without coma associated with diabetes mellitus due to underlying condition ([facility] [address]  Resolved Problems:    * No resolved hospital problems. *      Plan:  1. Diabetic ketoacidosis -  on admission 5/4/2020. Patient was out insulin needles for several days.  - Blood glucose  538, anion gap was 34, sodium 131, potassium was 5.5. Received bolus 0.9% normal saline, followed by dextrose 5% in normal saline with potassium and started on insulin drip. -DKA improving    -Off IV fluids  -Off insulin drip, continue with insulin sliding scale Humalog insulin, Lantus  -Ion gap corrected to 11 today, blood glucose this morning 96  -Encourage oral intake, diabetic diet    2.   Nausea improved, Zofran as needed        -We will ask IV team to place a Hep-Lock, possibly pull triple-lumen  -Discuss discharge plan with

## 2020-05-06 NOTE — PLAN OF CARE
Problem: Pain:  Description: Pain management should include both nonpharmacologic and pharmacologic interventions.   Goal: Control of acute pain  Description: Control of acute pain  5/5/2020 2158 by Raquel Nogueira RN  Outcome: Met This Shift     Problem: Serum Glucose Level - Abnormal:  Goal: Ability to maintain appropriate glucose levels will improve  Description: Ability to maintain appropriate glucose levels will improve  Outcome: Met This Shift     Problem: Sensory Perception - Impaired:  Goal: Ability to maintain a stable neurologic state will improve  Description: Ability to maintain a stable neurologic state will improve  Outcome: Met This Shift

## 2020-05-07 VITALS
BODY MASS INDEX: 19.51 KG/M2 | RESPIRATION RATE: 14 BRPM | SYSTOLIC BLOOD PRESSURE: 122 MMHG | TEMPERATURE: 98 F | HEART RATE: 104 BPM | OXYGEN SATURATION: 100 % | DIASTOLIC BLOOD PRESSURE: 68 MMHG | HEIGHT: 62 IN | WEIGHT: 106 LBS

## 2020-05-07 LAB
ANION GAP SERPL CALCULATED.3IONS-SCNC: 12 MMOL/L (ref 7–16)
BASOPHILS ABSOLUTE: 0.02 E9/L (ref 0–0.2)
BASOPHILS RELATIVE PERCENT: 0.3 % (ref 0–2)
BUN BLDV-MCNC: 9 MG/DL (ref 6–20)
CALCIUM SERPL-MCNC: 9.1 MG/DL (ref 8.6–10.2)
CHLORIDE BLD-SCNC: 105 MMOL/L (ref 98–107)
CO2: 22 MMOL/L (ref 22–29)
CREAT SERPL-MCNC: 0.4 MG/DL (ref 0.5–1)
EOSINOPHILS ABSOLUTE: 0.06 E9/L (ref 0.05–0.5)
EOSINOPHILS RELATIVE PERCENT: 1 % (ref 0–6)
GFR AFRICAN AMERICAN: >60
GFR NON-AFRICAN AMERICAN: >60 ML/MIN/1.73
GLUCOSE BLD-MCNC: 96 MG/DL (ref 74–99)
HCT VFR BLD CALC: 33.6 % (ref 34–48)
HEMOGLOBIN: 11.8 G/DL (ref 11.5–15.5)
IMMATURE GRANULOCYTES #: 0.02 E9/L
IMMATURE GRANULOCYTES %: 0.3 % (ref 0–5)
LYMPHOCYTES ABSOLUTE: 3.19 E9/L (ref 1.5–4)
LYMPHOCYTES RELATIVE PERCENT: 51 % (ref 20–42)
MAGNESIUM: 1.6 MG/DL (ref 1.6–2.6)
MCH RBC QN AUTO: 29.3 PG (ref 26–35)
MCHC RBC AUTO-ENTMCNC: 35.1 % (ref 32–34.5)
MCV RBC AUTO: 83.4 FL (ref 80–99.9)
METER GLUCOSE: 102 MG/DL (ref 74–99)
METER GLUCOSE: 146 MG/DL (ref 74–99)
METER GLUCOSE: 153 MG/DL (ref 74–99)
MONOCYTES ABSOLUTE: 0.65 E9/L (ref 0.1–0.95)
MONOCYTES RELATIVE PERCENT: 10.4 % (ref 2–12)
NEUTROPHILS ABSOLUTE: 2.32 E9/L (ref 1.8–7.3)
NEUTROPHILS RELATIVE PERCENT: 37 % (ref 43–80)
PDW BLD-RTO: 12.4 FL (ref 11.5–15)
PHOSPHORUS: 2.8 MG/DL (ref 2.5–4.5)
PLATELET # BLD: 252 E9/L (ref 130–450)
PMV BLD AUTO: 9.9 FL (ref 7–12)
POTASSIUM SERPL-SCNC: 3.5 MMOL/L (ref 3.5–5)
RBC # BLD: 4.03 E12/L (ref 3.5–5.5)
SODIUM BLD-SCNC: 139 MMOL/L (ref 132–146)
WBC # BLD: 6.3 E9/L (ref 4.5–11.5)

## 2020-05-07 PROCEDURE — 36415 COLL VENOUS BLD VENIPUNCTURE: CPT

## 2020-05-07 PROCEDURE — 6370000000 HC RX 637 (ALT 250 FOR IP): Performed by: INTERNAL MEDICINE

## 2020-05-07 PROCEDURE — 2580000003 HC RX 258: Performed by: INTERNAL MEDICINE

## 2020-05-07 PROCEDURE — 80048 BASIC METABOLIC PNL TOTAL CA: CPT

## 2020-05-07 PROCEDURE — 83735 ASSAY OF MAGNESIUM: CPT

## 2020-05-07 PROCEDURE — APPSS45 APP SPLIT SHARED TIME 31-45 MINUTES: Performed by: CLINICAL NURSE SPECIALIST

## 2020-05-07 PROCEDURE — 84100 ASSAY OF PHOSPHORUS: CPT

## 2020-05-07 PROCEDURE — 82962 GLUCOSE BLOOD TEST: CPT

## 2020-05-07 PROCEDURE — 85025 COMPLETE CBC W/AUTO DIFF WBC: CPT

## 2020-05-07 RX ORDER — BLOOD-GLUCOSE METER
KIT MISCELLANEOUS
Qty: 250 EACH | Refills: 5 | Status: SHIPPED | OUTPATIENT
Start: 2020-05-07 | End: 2020-08-12

## 2020-05-07 RX ORDER — PEN NEEDLE, DIABETIC 32GX 5/32"
NEEDLE, DISPOSABLE MISCELLANEOUS
Qty: 200 EACH | Refills: 5 | Status: ON HOLD | OUTPATIENT
Start: 2020-05-07 | End: 2020-07-12 | Stop reason: ALTCHOICE

## 2020-05-07 RX ORDER — INSULIN LISPRO 100 [IU]/ML
INJECTION, SOLUTION INTRAVENOUS; SUBCUTANEOUS
Qty: 15 PEN | Refills: 5 | Status: SHIPPED | OUTPATIENT
Start: 2020-05-07 | End: 2020-06-29

## 2020-05-07 RX ORDER — POTASSIUM CHLORIDE 20 MEQ/1
40 TABLET, EXTENDED RELEASE ORAL ONCE
Status: COMPLETED | OUTPATIENT
Start: 2020-05-07 | End: 2020-05-07

## 2020-05-07 RX ORDER — INSULIN ASPART 100 [IU]/ML
INJECTION, SOLUTION INTRAVENOUS; SUBCUTANEOUS
Qty: 15 PEN | Refills: 5 | Status: SHIPPED | OUTPATIENT
Start: 2020-05-07 | End: 2020-05-07 | Stop reason: HOSPADM

## 2020-05-07 RX ADMIN — HYDROCODONE BITARTRATE AND ACETAMINOPHEN 1 TABLET: 5; 325 TABLET ORAL at 10:48

## 2020-05-07 RX ADMIN — POTASSIUM CHLORIDE 40 MEQ: 20 TABLET, EXTENDED RELEASE ORAL at 12:03

## 2020-05-07 RX ADMIN — INSULIN GLARGINE 28 UNITS: 100 INJECTION, SOLUTION SUBCUTANEOUS at 08:11

## 2020-05-07 RX ADMIN — SODIUM CHLORIDE, PRESERVATIVE FREE 10 ML: 5 INJECTION INTRAVENOUS at 08:08

## 2020-05-07 RX ADMIN — INSULIN LISPRO 2 UNITS: 100 INJECTION, SOLUTION INTRAVENOUS; SUBCUTANEOUS at 08:11

## 2020-05-07 RX ADMIN — HYDROCODONE BITARTRATE AND ACETAMINOPHEN 1 TABLET: 5; 325 TABLET ORAL at 03:24

## 2020-05-07 ASSESSMENT — PAIN SCALES - GENERAL
PAINLEVEL_OUTOF10: 0
PAINLEVEL_OUTOF10: 8
PAINLEVEL_OUTOF10: 8
PAINLEVEL_OUTOF10: 0
PAINLEVEL_OUTOF10: 3

## 2020-05-07 ASSESSMENT — PAIN DESCRIPTION - ORIENTATION
ORIENTATION: LOWER
ORIENTATION: LOWER;MID

## 2020-05-07 ASSESSMENT — PAIN DESCRIPTION - FREQUENCY: FREQUENCY: INTERMITTENT

## 2020-05-07 ASSESSMENT — PAIN DESCRIPTION - DESCRIPTORS
DESCRIPTORS: CONSTANT;DISCOMFORT
DESCRIPTORS: ACHING;DISCOMFORT

## 2020-05-07 ASSESSMENT — PAIN DESCRIPTION - LOCATION
LOCATION: BACK
LOCATION: BACK

## 2020-05-07 ASSESSMENT — PAIN DESCRIPTION - PROGRESSION: CLINICAL_PROGRESSION: NOT CHANGED

## 2020-05-07 ASSESSMENT — PAIN DESCRIPTION - PAIN TYPE
TYPE: CHRONIC PAIN
TYPE: CHRONIC PAIN

## 2020-05-07 ASSESSMENT — PAIN DESCRIPTION - ONSET: ONSET: ON-GOING

## 2020-05-07 NOTE — DISCHARGE SUMMARY
[facility] Physician Discharge Summary       Nadira Kunz DO  [address] [facility] [address]     Call in 1 week  Hospital Follow Up     [address]   [place] [address]  [phone]          Activity level: As tolerated     Dispo: Home    Condition on discharge: Stable     Patient ID:  Carmela Interiano  [id number]  11 y.o.  1995    Admit date: 5/4/2020    Discharge date and time:  5/7/2020  11:59 AM    Admission Diagnoses: Active Problems:    Diabetic ketoacidosis without coma associated with diabetes mellitus due to underlying condition [facility])  Resolved Problems:    * No resolved hospital problems. *      Discharge Diagnoses: Active Problems:    Diabetic ketoacidosis without coma associated with diabetes mellitus due to underlying condition [facility])  Resolved Problems:    * No resolved hospital problems. *      Consults:  IP CONSULT TO CRITICAL CARE  IP CONSULT TO IV TEAM  IP CONSULT TO SOCIAL WORK  IP CONSULT TO IV TEAM  IP CONSULT TO HOME CARE NEEDS    Procedures:   No inpatient hospital operative procedures performed    Hospital Course:   Patient Carmela Interiano is a 27 y.o. presented with Diabetic ketoacidosis without coma associated with diabetes mellitus due to underlying condition ([facility] [address] [E08.10]  Diabetic ketoacidosis without coma associated with diabetes mellitus due to underlying condition ([facility] [address] [E08.10]  Diabetic ketoacidosis without coma associated with diabetes mellitus due to underlying condition ([facility] [address] [E08.10]  Patient with a past medical history of diabetes type 1, right-sided femoral artery injury. Admitted 5/4/2020 coming in weakness, thirst, she had stopped taking her insulin for several days because she did not have any needles. Found to be in [facility] blood glucose 538, anion gap 34. She was fluid volume resuscitated IV insulin drip started.   She was stabilized IV fluids stopped a diet encouraged switched over to insulin sliding scale and Lantus here. As an outpatient she takes insulin glargine KwikPen nightly and NovoLog FlexPen 3 times a day with meals         Plan:  1. Diabetic ketoacidosis -  on admission 5/4/2020. Patient was out insulin needles for several days.  - Blood glucose  538, anion gap was 34, sodium 131, potassium was 5.5. Received bolus 0.9% normal saline, followed by dextrose 5% in normal saline with potassium and started on insulin drip. As an outpatient she takes insulin glargine KwikPen nightly and NovoLog FlexPen 3 times a day with meals     -DKA resolved.     -Off IV fluids  -Off insulin drip, continue with insulin sliding scale Humalog insulin, Lantus  -Anion gap corrected to 11  -Encourage oral intake, diabetic diet     2. Nausea improved, Zofran as needed         -Pull triple-lumen at discharge. -BMP before discharge today will monitor CO2 and anion gap    Home medications continued, verify with inpatient pharmacy she can receive her insulin needles and pens. Will have home care come out for medication compliance. Recommend outpatient follow-up with any subspecialist and PCP for medication adjustments.   Reconciliation done        As an outpatient she takes insulin glargine KwikPen nightly and NovoLog FlexPen 3 times a day with meals--med reconciliation    -Okay to interchange NovoLog to Humalog to facilitate discharge and patient getting her insulin.       Discharge Exam:    General Appearance: alert and oriented to person, place and time and in no acute distress  Skin: warm and dry  Head: normocephalic and atraumatic  Eyes: pupils equal, round, and reactive to light, extraocular eye movements intact, conjunctivae normal  Neck: neck supple and non tender without mass   Pulmonary/Chest: clear to auscultation bilaterally- no wheezes, rales or rhonchi, normal air movement, no respiratory distress  Cardiovascular: normal rate, normal S1 and S2 and no carotid these medications    acetone (urine) test strip  Use daily as directed if bs >250 x2 or illness     BD Pen Needle Camila U/F 32G X 4 MM Misc  Generic drug:  Insulin Pen Needle  USE AS DIRECTED UP TO 6 TIMES A DAY     FREESTYLE LITE strip  Generic drug:  blood glucose test strips  Checks BS four times a day before meals and at bedtime and also as needed for high and low blood sugar     NovoLOG FlexPen 100 UNIT/ML injection pen  Generic drug:  insulin aspart  Three time a day before meals using carb ratio 1u:10g + sliding scale. MAX 50 units daily        STOP taking these medications    HYDROcodone-acetaminophen 5-325 MG per tablet  Commonly known as:  Laquita Chock           Where to Get Your Medications      You can get these medications from any pharmacy    Bring a paper prescription for each of these medications  · acetone (urine) test strip  · BD Pen Needle Camila U/F 32G X 4 MM Misc  · FREESTYLE LITE strip  · insulin lispro (1 Unit Dial) 100 UNIT/ML Sopn  · NovoLOG FlexPen 100 UNIT/ML injection pen           Note that more than 30 minutes was spent in preparing discharge papers, discussing discharge with patient, medication review, etc.    Signed:  Electronically signed by MIKE Steward on 5/7/2020 at 11:59 AM     Addendum: I have personally participated in the history, exam, medical decision making with Aracely Diamond NP on the date of service, I have personally reviewed imaging and lab data as well as EKG and I agree with all of the pertinent clinical information unless otherwise noted. I have also reviewed and agree with the past medical, family, and social history unless otherwise noted.       PHYSICAL EXAM:  Vitals:  /68   Pulse 104   Temp 98 °F (36.7 °C) (Oral)   Resp 14   Ht 5' 2\" (1.575 m)   Wt 106 lb (48.1 kg)   SpO2 100%   BMI 19.39 kg/m²     Gen: NAD, AAOx3, sitting in bed  HEENT: NCAT  CV: Tachycardia, no m/r/g  Resp: Equal chest rise b/l, CTAB  Abd: Soft, NT, ND  Ext: Good ROM of b/l upper and lower extremities, no BLE edema    Impression:  Active Problems:    Diabetic ketoacidosis without coma associated with diabetes mellitus due to underlying condition ([facility] [address]  Resolved Problems:    * No resolved hospital problems. *      My findings/plan include:    DKA resolved with good blood sugar control. The patient feels well and is tolerating a diet. She will have insulin and insulin needles given to her prior to discharge. Anticipate discharge today. NOTE: This report was transcribed using voice recognition software. Every effort was made to ensure accuracy; however, inadvertent computerized transcription errors may be present.   Electronically signed by Jose A Santiago MD on 5/7/2020 at 12:13 PM

## 2020-05-07 NOTE — PROGRESS NOTES
CLINICAL PHARMACY NOTE: MEDS TO [address] Select Patient?: No  Total # of Prescriptions Filled: 3   The following medications were delivered to the patient:  · ketostix  · novofine 32 g x 4  Mm  · humalog kwikpen  Total # of Interventions Completed: 7  Time Spent (min): 60    Additional Documentation:

## 2020-05-07 NOTE — CARE COORDINATION
New orders for discharge and [address] noted. Discussed same with patient who voiced understanding and agreement. Offered selections for [address] and patient chose [facility] Mckeon. Referral to [facility] with same, who will accept and follow. [facility]ash [facility].  Jaiden, MSN, RN  [facility] Case Management  [phone]

## 2020-05-22 ENCOUNTER — VIRTUAL VISIT (OUTPATIENT)
Dept: ENDOCRINOLOGY | Age: 25
End: 2020-05-22
Payer: COMMERCIAL

## 2020-05-22 PROBLEM — E55.9 VITAMIN D DEFICIENCY: Status: ACTIVE | Noted: 2020-05-22

## 2020-05-22 PROBLEM — E16.2 HYPOGLYCEMIA: Status: ACTIVE | Noted: 2020-05-22

## 2020-05-22 PROCEDURE — 3046F HEMOGLOBIN A1C LEVEL >9.0%: CPT | Performed by: INTERNAL MEDICINE

## 2020-05-22 PROCEDURE — 99214 OFFICE O/P EST MOD 30 MIN: CPT | Performed by: INTERNAL MEDICINE

## 2020-05-22 PROCEDURE — 2022F DILAT RTA XM EVC RTNOPTHY: CPT | Performed by: INTERNAL MEDICINE

## 2020-05-22 PROCEDURE — G8420 CALC BMI NORM PARAMETERS: HCPCS | Performed by: INTERNAL MEDICINE

## 2020-05-22 PROCEDURE — 1036F TOBACCO NON-USER: CPT | Performed by: INTERNAL MEDICINE

## 2020-05-22 PROCEDURE — 1111F DSCHRG MED/CURRENT MED MERGE: CPT | Performed by: INTERNAL MEDICINE

## 2020-05-22 PROCEDURE — G8427 DOCREV CUR MEDS BY ELIG CLIN: HCPCS | Performed by: INTERNAL MEDICINE

## 2020-05-22 RX ORDER — GABAPENTIN 100 MG/1
100 CAPSULE ORAL PRN
Status: ON HOLD | COMMUNITY
End: 2020-05-31

## 2020-05-22 NOTE — PROGRESS NOTES
type 1) ([facility] [address]      PAST SURGICAL HISTORY   Past Surgical History:   Procedure Laterality Date    ABDOMEN SURGERY N/A 5/9/2019    INCISION AND DRAINAGE OF SUPRAPUBIC ABSESS performed by Suzette Kohli MD at [address] [facility]      last yr     SOCIAL HISTORY   Tobacco:   reports that she has never smoked. She has never used smokeless tobacco.  Alcol:   reports no history of alcohol use. Illicit Drugs:   reports no history of drug use. FAMILY HISTORY   Family History   Problem Relation Age of Onset    Diabetes Maternal Grandmother     Diabetes Paternal Grandmother     Stroke Other     Thyroid Disease Neg Hx      ALLERGIES AND DRUG REACTIONS   No Known Allergies    CURRENT MEDICATIONS     Current Outpatient Medications   Medication Sig Dispense Refill    gabapentin (NEURONTIN) 100 MG capsule Take 100 mg by mouth as needed.  Insulin Pen Needle (BD PEN NEEDLE SHELBIE U/F) 32G X 4 MM MISC USE AS DIRECTED UP TO 6 TIMES A  each 5    FREESTYLE LITE strip Checks BS four times a day before meals and at bedtime and also as needed for high and low blood sugar 250 each 5    acetone, urine, test strip Use daily as directed if bs >250 x2 or illness 30 strip 0    insulin lispro, 1 Unit Dial, (HUMALOG KWIKPEN) 100 UNIT/ML SOPN 3 times a day before meals using carb ratio 1u:10g + sliding scale. Max 50 units daily. Dispense 15 pens, R-5 KAUR 15 pen 5    insulin glargine (BASAGLAR KWIKPEN) 100 UNIT/ML injection pen Inject 28 Units into the skin nightly (Patient taking differently: Inject 28 Units into the skin daily ) 10 pen 3     No current facility-administered medications for this visit. Review of Systems  Constitutional: No fever, no chills, no diaphoresis, no generalized weakness. HEENT: No blurred vision, No sore throat, no ear pain, no hair loss  Neck: denied any neck swelling, difficulty swallowing,   Cardio-pulmonary: No CP, SOB or palpitation, No orthopnea or PND.  No cough or wheezing. GI: No N/V/D, no constipation, No abdominal pain, no melena or hematochezia   : Denied any dysuria, hematuria, flank pain, discharge, or incontinence. Skin: denied any rash, ulcer, Hirsute, or hyperpigmentation. MSK: denied any joint deformity, joint pain/swelling, muscle pain, or back pain. Neuro: + numbness and tingling in lower extremities. OBJECTIVE    There were no vitals taken for this visit. BP Readings from Last 4 Encounters:   05/07/20 122/68   05/01/20 124/70   03/13/20 110/70   03/09/20 (!) 134/90     Wt Readings from Last 6 Encounters:   05/07/20 106 lb (48.1 kg)   05/01/20 119 lb (54 kg)   03/13/20 105 lb (47.6 kg)   03/08/20 111 lb (50.3 kg)   02/18/20 101 lb (45.8 kg)   11/30/19 105 lb (47.6 kg)     Physical examination:  Due to this being a TeleHealth encounter, evaluation of the following organ systems is limited: Vitals/Constitutional/EENT/Resp/CV/GI//MS/Neuro/Skin/Heme-Lymph-Imm. Modified physical exam through Telemedicine camera    General: Communicating well via camera   Neck: no obvious neck mass. No obvious neck deformity     CVS: no distress   Chest: no distress. Chest is moving with respiration    Extremities:  no visible tremor  Skin: No visible rashes as seen from camera   Musculoskeletal: no visible deformity  Neuro: Alert and oriented to person, place, and time. Psychiatric: Normal mood and affect.  Behavior is normal    Review of Laboratory Data:  I have reviewed the following:  Lab Results   Component Value Date/Time    WBC 6.3 05/07/2020 10:50 AM    RBC 4.03 05/07/2020 10:50 AM    HGB 11.8 05/07/2020 10:50 AM    HCT 33.6 (L) 05/07/2020 10:50 AM    MCV 83.4 05/07/2020 10:50 AM    MCH 29.3 05/07/2020 10:50 AM    MCHC 35.1 (H) 05/07/2020 10:50 AM    RDW 12.4 05/07/2020 10:50 AM     05/07/2020 10:50 AM    MPV 9.9 05/07/2020 10:50 AM      Lab Results   Component Value Date/Time     05/07/2020 10:50 AM    K 3.5 05/07/2020 10:50 AM    K

## 2020-05-30 ENCOUNTER — APPOINTMENT (OUTPATIENT)
Dept: CT IMAGING | Age: 25
End: 2020-05-30
Payer: COMMERCIAL

## 2020-05-30 ENCOUNTER — HOSPITAL ENCOUNTER (EMERGENCY)
Age: 25
Discharge: ANOTHER HOSPITAL | End: 2020-05-31
Attending: FAMILY MEDICINE
Payer: COMMERCIAL

## 2020-05-30 LAB
ALBUMIN SERPL-MCNC: 4 G/DL (ref 3.5–5.2)
ALP BLD-CCNC: 83 U/L (ref 35–104)
ALT SERPL-CCNC: 20 U/L (ref 0–32)
ANION GAP SERPL CALCULATED.3IONS-SCNC: 16 MMOL/L (ref 7–16)
AST SERPL-CCNC: 32 U/L (ref 0–31)
BACTERIA: ABNORMAL /HPF
BASOPHILS ABSOLUTE: 0.02 E9/L (ref 0–0.2)
BASOPHILS RELATIVE PERCENT: 0.3 % (ref 0–2)
BILIRUB SERPL-MCNC: 0.3 MG/DL (ref 0–1.2)
BILIRUBIN URINE: NEGATIVE
BLOOD, URINE: NEGATIVE
BUN BLDV-MCNC: 13 MG/DL (ref 6–20)
CALCIUM SERPL-MCNC: 9.2 MG/DL (ref 8.6–10.2)
CHLORIDE BLD-SCNC: 89 MMOL/L (ref 98–107)
CHP ED QC CHECK: YES
CHP ED QC CHECK: YES
CLARITY: CLEAR
CO2: 23 MMOL/L (ref 22–29)
COLOR: YELLOW
CREAT SERPL-MCNC: 0.6 MG/DL (ref 0.5–1)
EOSINOPHILS ABSOLUTE: 0.05 E9/L (ref 0.05–0.5)
EOSINOPHILS RELATIVE PERCENT: 0.8 % (ref 0–6)
GFR AFRICAN AMERICAN: >60
GFR NON-AFRICAN AMERICAN: >60 ML/MIN/1.73
GLUCOSE BLD-MCNC: 486 MG/DL
GLUCOSE BLD-MCNC: 662 MG/DL (ref 74–99)
GLUCOSE BLD-MCNC: NORMAL MG/DL
GLUCOSE URINE: >=1000 MG/DL
HCG(URINE) PREGNANCY TEST: NEGATIVE
HCT VFR BLD CALC: 39.4 % (ref 34–48)
HEMOGLOBIN: 13.4 G/DL (ref 11.5–15.5)
IMMATURE GRANULOCYTES #: 0.01 E9/L
IMMATURE GRANULOCYTES %: 0.2 % (ref 0–5)
KETONES, URINE: 15 MG/DL
LACTIC ACID: 1.5 MMOL/L (ref 0.5–2.2)
LEUKOCYTE ESTERASE, URINE: ABNORMAL
LIPASE: 14 U/L (ref 13–60)
LYMPHOCYTES ABSOLUTE: 2.52 E9/L (ref 1.5–4)
LYMPHOCYTES RELATIVE PERCENT: 41.3 % (ref 20–42)
MCH RBC QN AUTO: 29.1 PG (ref 26–35)
MCHC RBC AUTO-ENTMCNC: 34 % (ref 32–34.5)
MCV RBC AUTO: 85.7 FL (ref 80–99.9)
METER GLUCOSE: >500 MG/DL (ref 74–99)
MONOCYTES ABSOLUTE: 0.32 E9/L (ref 0.1–0.95)
MONOCYTES RELATIVE PERCENT: 5.2 % (ref 2–12)
NEUTROPHILS ABSOLUTE: 3.18 E9/L (ref 1.8–7.3)
NEUTROPHILS RELATIVE PERCENT: 52.2 % (ref 43–80)
NITRITE, URINE: NEGATIVE
PDW BLD-RTO: 11.8 FL (ref 11.5–15)
PH UA: 6 (ref 5–9)
PH VENOUS: 7.29 (ref 7.35–7.45)
PLATELET # BLD: 259 E9/L (ref 130–450)
PMV BLD AUTO: 11.7 FL (ref 7–12)
POTASSIUM REFLEX MAGNESIUM: 4.4 MMOL/L (ref 3.5–5)
PROTEIN UA: NEGATIVE MG/DL
RBC # BLD: 4.6 E12/L (ref 3.5–5.5)
RBC UA: ABNORMAL /HPF (ref 0–2)
SODIUM BLD-SCNC: 128 MMOL/L (ref 132–146)
SPECIFIC GRAVITY UA: 1.01 (ref 1–1.03)
TOTAL PROTEIN: 7.3 G/DL (ref 6.4–8.3)
TROPONIN: <0.01 NG/ML (ref 0–0.03)
UROBILINOGEN, URINE: 0.2 E.U./DL
WBC # BLD: 6.1 E9/L (ref 4.5–11.5)
WBC UA: ABNORMAL /HPF (ref 0–5)

## 2020-05-30 PROCEDURE — 81025 URINE PREGNANCY TEST: CPT

## 2020-05-30 PROCEDURE — 99285 EMERGENCY DEPT VISIT HI MDM: CPT

## 2020-05-30 PROCEDURE — 82010 KETONE BODYS QUAN: CPT

## 2020-05-30 PROCEDURE — 83605 ASSAY OF LACTIC ACID: CPT

## 2020-05-30 PROCEDURE — 87088 URINE BACTERIA CULTURE: CPT

## 2020-05-30 PROCEDURE — 36415 COLL VENOUS BLD VENIPUNCTURE: CPT

## 2020-05-30 PROCEDURE — 96374 THER/PROPH/DIAG INJ IV PUSH: CPT

## 2020-05-30 PROCEDURE — 84484 ASSAY OF TROPONIN QUANT: CPT

## 2020-05-30 PROCEDURE — 6360000002 HC RX W HCPCS: Performed by: FAMILY MEDICINE

## 2020-05-30 PROCEDURE — 83036 HEMOGLOBIN GLYCOSYLATED A1C: CPT

## 2020-05-30 PROCEDURE — 82800 BLOOD PH: CPT

## 2020-05-30 PROCEDURE — 81001 URINALYSIS AUTO W/SCOPE: CPT

## 2020-05-30 PROCEDURE — 85025 COMPLETE CBC W/AUTO DIFF WBC: CPT

## 2020-05-30 PROCEDURE — 2580000003 HC RX 258: Performed by: FAMILY MEDICINE

## 2020-05-30 PROCEDURE — 82962 GLUCOSE BLOOD TEST: CPT

## 2020-05-30 PROCEDURE — 80053 COMPREHEN METABOLIC PANEL: CPT

## 2020-05-30 PROCEDURE — 74177 CT ABD & PELVIS W/CONTRAST: CPT

## 2020-05-30 PROCEDURE — 83690 ASSAY OF LIPASE: CPT

## 2020-05-30 RX ORDER — NICOTINE POLACRILEX 4 MG
15 LOZENGE BUCCAL PRN
Status: DISCONTINUED | OUTPATIENT
Start: 2020-05-30 | End: 2020-05-31 | Stop reason: HOSPADM

## 2020-05-30 RX ORDER — DEXTROSE MONOHYDRATE 50 MG/ML
100 INJECTION, SOLUTION INTRAVENOUS PRN
Status: DISCONTINUED | OUTPATIENT
Start: 2020-05-30 | End: 2020-05-31 | Stop reason: HOSPADM

## 2020-05-30 RX ORDER — KETOROLAC TROMETHAMINE 30 MG/ML
30 INJECTION, SOLUTION INTRAMUSCULAR; INTRAVENOUS ONCE
Status: COMPLETED | OUTPATIENT
Start: 2020-05-30 | End: 2020-05-30

## 2020-05-30 RX ORDER — DEXTROSE MONOHYDRATE 25 G/50ML
12.5 INJECTION, SOLUTION INTRAVENOUS PRN
Status: DISCONTINUED | OUTPATIENT
Start: 2020-05-30 | End: 2020-05-31 | Stop reason: HOSPADM

## 2020-05-30 RX ORDER — 0.9 % SODIUM CHLORIDE 0.9 %
1000 INTRAVENOUS SOLUTION INTRAVENOUS ONCE
Status: COMPLETED | OUTPATIENT
Start: 2020-05-30 | End: 2020-05-30

## 2020-05-30 RX ADMIN — SODIUM CHLORIDE 1000 ML: 9 INJECTION, SOLUTION INTRAVENOUS at 22:09

## 2020-05-30 RX ADMIN — KETOROLAC TROMETHAMINE 30 MG: 30 INJECTION, SOLUTION INTRAMUSCULAR at 22:17

## 2020-05-30 ASSESSMENT — PAIN DESCRIPTION - DESCRIPTORS: DESCRIPTORS: SHARP

## 2020-05-30 ASSESSMENT — PAIN SCALES - GENERAL
PAINLEVEL_OUTOF10: 10
PAINLEVEL_OUTOF10: 10

## 2020-05-30 ASSESSMENT — PAIN DESCRIPTION - PAIN TYPE: TYPE: ACUTE PAIN

## 2020-05-30 ASSESSMENT — PAIN DESCRIPTION - LOCATION: LOCATION: ABDOMEN;BACK

## 2020-05-31 ENCOUNTER — HOSPITAL ENCOUNTER (INPATIENT)
Age: 25
LOS: 1 days | Discharge: HOME | DRG: 420 | End: 2020-05-31
Attending: INTERNAL MEDICINE | Admitting: INTERNAL MEDICINE
Payer: COMMERCIAL

## 2020-05-31 VITALS
HEART RATE: 106 BPM | HEIGHT: 61 IN | DIASTOLIC BLOOD PRESSURE: 89 MMHG | BODY MASS INDEX: 19.79 KG/M2 | SYSTOLIC BLOOD PRESSURE: 128 MMHG | OXYGEN SATURATION: 98 % | TEMPERATURE: 98.2 F | WEIGHT: 104.8 LBS | RESPIRATION RATE: 24 BRPM

## 2020-05-31 VITALS
RESPIRATION RATE: 18 BRPM | HEIGHT: 61 IN | DIASTOLIC BLOOD PRESSURE: 107 MMHG | WEIGHT: 107 LBS | OXYGEN SATURATION: 98 % | BODY MASS INDEX: 20.2 KG/M2 | SYSTOLIC BLOOD PRESSURE: 157 MMHG | HEART RATE: 94 BPM | TEMPERATURE: 97.6 F

## 2020-05-31 LAB
ANION GAP SERPL CALCULATED.3IONS-SCNC: 12 MMOL/L (ref 7–16)
ANION GAP SERPL CALCULATED.3IONS-SCNC: 8 MMOL/L (ref 7–16)
B.E.: -2 MMOL/L
BETA-HYDROXYBUTYRATE: 0.95 MMOL/L (ref 0.02–0.27)
BETA-HYDROXYBUTYRATE: 2.1 MMOL/L (ref 0.02–0.27)
BUN BLDV-MCNC: 10 MG/DL (ref 6–20)
BUN BLDV-MCNC: 9 MG/DL (ref 6–20)
CALCIUM SERPL-MCNC: 8.6 MG/DL (ref 8.6–10.2)
CALCIUM SERPL-MCNC: 8.7 MG/DL (ref 8.6–10.2)
CHLORIDE BLD-SCNC: 101 MMOL/L (ref 98–107)
CHLORIDE BLD-SCNC: 104 MMOL/L (ref 98–107)
CHP ED QC CHECK: YES
CO2: 19 MMOL/L (ref 22–29)
CO2: 24 MMOL/L (ref 22–29)
COHB: 0.9 % (ref 0–1.5)
CREAT SERPL-MCNC: 0.4 MG/DL (ref 0.5–1)
CREAT SERPL-MCNC: 0.5 MG/DL (ref 0.5–1)
CRITICAL: ABNORMAL
DATE ANALYZED: ABNORMAL
DATE OF COLLECTION: ABNORMAL
GFR AFRICAN AMERICAN: >60
GFR AFRICAN AMERICAN: >60
GFR NON-AFRICAN AMERICAN: >60 ML/MIN/1.73
GFR NON-AFRICAN AMERICAN: >60 ML/MIN/1.73
GLUCOSE BLD-MCNC: 123 MG/DL (ref 74–99)
GLUCOSE BLD-MCNC: 273 MG/DL (ref 74–99)
GLUCOSE BLD-MCNC: 352 MG/DL
HBA1C MFR BLD: 13 % (ref 4–5.6)
HCO3: 22.6 MMOL/L
HHB: 17.2 %
LAB: ABNORMAL
Lab: ABNORMAL
MAGNESIUM: 1.6 MG/DL (ref 1.6–2.6)
MAGNESIUM: 1.7 MG/DL (ref 1.6–2.6)
METER GLUCOSE: 106 MG/DL (ref 74–99)
METER GLUCOSE: 111 MG/DL (ref 74–99)
METER GLUCOSE: 146 MG/DL (ref 74–99)
METER GLUCOSE: 234 MG/DL (ref 74–99)
METER GLUCOSE: 292 MG/DL (ref 74–99)
METER GLUCOSE: 352 MG/DL (ref 74–99)
METER GLUCOSE: 486 MG/DL (ref 74–99)
METER GLUCOSE: 72 MG/DL (ref 74–99)
METER GLUCOSE: 99 MG/DL (ref 74–99)
METHB: 0.3 % (ref 0–1.5)
MODE: ABNORMAL
O2 CONTENT: 14 ML/DL
O2 SATURATION: 82.6 %
O2HB: 81.6 %
OPERATOR ID: ABNORMAL
PATIENT TEMP: 37 C
PCO2: 37.7 MMHG (ref 40–52)
PH BLOOD GAS: 7.39 (ref 7.3–7.42)
PHOSPHORUS: 2.9 MG/DL (ref 2.5–4.5)
PHOSPHORUS: 3.2 MG/DL (ref 2.5–4.5)
PO2: 50.7 MMHG (ref 30–50)
POTASSIUM SERPL-SCNC: 3.3 MMOL/L (ref 3.5–5)
POTASSIUM SERPL-SCNC: 3.6 MMOL/L (ref 3.5–5)
SODIUM BLD-SCNC: 132 MMOL/L (ref 132–146)
SODIUM BLD-SCNC: 136 MMOL/L (ref 132–146)
SOURCE, BLOOD GAS: ABNORMAL
THB: 12.2 G/DL (ref 11.5–16.5)
TIME ANALYZED: 342

## 2020-05-31 PROCEDURE — 2000000000 HC ICU R&B

## 2020-05-31 PROCEDURE — 6370000000 HC RX 637 (ALT 250 FOR IP): Performed by: EMERGENCY MEDICINE

## 2020-05-31 PROCEDURE — 6360000004 HC RX CONTRAST MEDICATION: Performed by: RADIOLOGY

## 2020-05-31 PROCEDURE — 83735 ASSAY OF MAGNESIUM: CPT

## 2020-05-31 PROCEDURE — 36415 COLL VENOUS BLD VENIPUNCTURE: CPT

## 2020-05-31 PROCEDURE — 74177 CT ABD & PELVIS W/CONTRAST: CPT

## 2020-05-31 PROCEDURE — 82010 KETONE BODYS QUAN: CPT

## 2020-05-31 PROCEDURE — 84100 ASSAY OF PHOSPHORUS: CPT

## 2020-05-31 PROCEDURE — 2580000003 HC RX 258: Performed by: FAMILY MEDICINE

## 2020-05-31 PROCEDURE — 99236 HOSP IP/OBS SAME DATE HI 85: CPT | Performed by: INTERNAL MEDICINE

## 2020-05-31 PROCEDURE — 82962 GLUCOSE BLOOD TEST: CPT

## 2020-05-31 PROCEDURE — 87081 CULTURE SCREEN ONLY: CPT

## 2020-05-31 PROCEDURE — 51798 US URINE CAPACITY MEASURE: CPT

## 2020-05-31 PROCEDURE — 99220 PR INITIAL OBSERVATION CARE/DAY 70 MINUTES: CPT | Performed by: INTERNAL MEDICINE

## 2020-05-31 PROCEDURE — 2580000003 HC RX 258: Performed by: INTERNAL MEDICINE

## 2020-05-31 PROCEDURE — 96365 THER/PROPH/DIAG IV INF INIT: CPT

## 2020-05-31 PROCEDURE — 6370000000 HC RX 637 (ALT 250 FOR IP): Performed by: FAMILY MEDICINE

## 2020-05-31 PROCEDURE — G0379 DIRECT REFER HOSPITAL OBSERV: HCPCS

## 2020-05-31 PROCEDURE — G0378 HOSPITAL OBSERVATION PER HR: HCPCS

## 2020-05-31 PROCEDURE — 6370000000 HC RX 637 (ALT 250 FOR IP): Performed by: INTERNAL MEDICINE

## 2020-05-31 PROCEDURE — 80048 BASIC METABOLIC PNL TOTAL CA: CPT

## 2020-05-31 PROCEDURE — 96366 THER/PROPH/DIAG IV INF ADDON: CPT

## 2020-05-31 PROCEDURE — 82805 BLOOD GASES W/O2 SATURATION: CPT

## 2020-05-31 RX ORDER — POLYETHYLENE GLYCOL 3350 17 G/17G
17 POWDER, FOR SOLUTION ORAL DAILY PRN
Qty: 1 BOTTLE | Refills: 1 | Status: SHIPPED | OUTPATIENT
Start: 2020-05-31 | End: 2020-06-30

## 2020-05-31 RX ORDER — POTASSIUM CHLORIDE 20 MEQ/1
TABLET, EXTENDED RELEASE ORAL
Status: DISCONTINUED
Start: 2020-05-31 | End: 2020-05-31 | Stop reason: HOSPADM

## 2020-05-31 RX ORDER — POTASSIUM CHLORIDE 20 MEQ/1
40 TABLET, EXTENDED RELEASE ORAL ONCE
Status: COMPLETED | OUTPATIENT
Start: 2020-05-31 | End: 2020-05-31

## 2020-05-31 RX ORDER — HYDROCODONE BITARTRATE AND ACETAMINOPHEN 5; 325 MG/1; MG/1
1 TABLET ORAL EVERY 6 HOURS PRN
Status: DISCONTINUED | OUTPATIENT
Start: 2020-05-31 | End: 2020-05-31 | Stop reason: HOSPADM

## 2020-05-31 RX ORDER — SODIUM CHLORIDE 9 MG/ML
INJECTION, SOLUTION INTRAVENOUS CONTINUOUS
Status: DISCONTINUED | OUTPATIENT
Start: 2020-05-31 | End: 2020-05-31

## 2020-05-31 RX ORDER — BISACODYL 10 MG
10 SUPPOSITORY, RECTAL RECTAL DAILY PRN
Status: DISCONTINUED | OUTPATIENT
Start: 2020-05-31 | End: 2020-05-31 | Stop reason: HOSPADM

## 2020-05-31 RX ORDER — MAGNESIUM SULFATE 1 G/100ML
1 INJECTION INTRAVENOUS PRN
Status: DISCONTINUED | OUTPATIENT
Start: 2020-05-31 | End: 2020-05-31 | Stop reason: HOSPADM

## 2020-05-31 RX ORDER — DEXTROSE AND SODIUM CHLORIDE 5; .45 G/100ML; G/100ML
INJECTION, SOLUTION INTRAVENOUS CONTINUOUS PRN
Status: DISCONTINUED | OUTPATIENT
Start: 2020-05-31 | End: 2020-05-31 | Stop reason: HOSPADM

## 2020-05-31 RX ORDER — DEXTROSE MONOHYDRATE 25 G/50ML
12.5 INJECTION, SOLUTION INTRAVENOUS PRN
Status: DISCONTINUED | OUTPATIENT
Start: 2020-05-31 | End: 2020-05-31 | Stop reason: HOSPADM

## 2020-05-31 RX ORDER — 0.9 % SODIUM CHLORIDE 0.9 %
1000 INTRAVENOUS SOLUTION INTRAVENOUS ONCE
Status: COMPLETED | OUTPATIENT
Start: 2020-05-31 | End: 2020-05-31

## 2020-05-31 RX ORDER — 0.9 % SODIUM CHLORIDE 0.9 %
15 INTRAVENOUS SOLUTION INTRAVENOUS ONCE
Status: DISCONTINUED | OUTPATIENT
Start: 2020-05-31 | End: 2020-05-31

## 2020-05-31 RX ORDER — DEXTROSE MONOHYDRATE 25 G/50ML
12.5 INJECTION, SOLUTION INTRAVENOUS PRN
Status: DISCONTINUED | OUTPATIENT
Start: 2020-05-31 | End: 2020-05-31

## 2020-05-31 RX ORDER — POLYETHYLENE GLYCOL 3350 17 G/17G
17 POWDER, FOR SOLUTION ORAL DAILY
Status: DISCONTINUED | OUTPATIENT
Start: 2020-05-31 | End: 2020-05-31 | Stop reason: HOSPADM

## 2020-05-31 RX ORDER — MAGNESIUM SULFATE 1 G/100ML
1 INJECTION INTRAVENOUS PRN
Status: DISCONTINUED | OUTPATIENT
Start: 2020-05-31 | End: 2020-05-31

## 2020-05-31 RX ORDER — POTASSIUM CHLORIDE 7.45 MG/ML
10 INJECTION INTRAVENOUS PRN
Status: DISCONTINUED | OUTPATIENT
Start: 2020-05-31 | End: 2020-05-31

## 2020-05-31 RX ORDER — INSULIN GLARGINE 100 [IU]/ML
24 INJECTION, SOLUTION SUBCUTANEOUS NIGHTLY
Status: COMPLETED | OUTPATIENT
Start: 2020-05-31 | End: 2020-05-31

## 2020-05-31 RX ORDER — DEXTROSE AND SODIUM CHLORIDE 5; .45 G/100ML; G/100ML
INJECTION, SOLUTION INTRAVENOUS CONTINUOUS PRN
Status: DISCONTINUED | OUTPATIENT
Start: 2020-05-31 | End: 2020-05-31

## 2020-05-31 RX ORDER — INSULIN GLARGINE 100 [IU]/ML
28 INJECTION, SOLUTION SUBCUTANEOUS NIGHTLY
Status: DISCONTINUED | OUTPATIENT
Start: 2020-05-31 | End: 2020-05-31 | Stop reason: HOSPADM

## 2020-05-31 RX ORDER — NICOTINE POLACRILEX 4 MG
15 LOZENGE BUCCAL PRN
Status: DISCONTINUED | OUTPATIENT
Start: 2020-05-31 | End: 2020-05-31 | Stop reason: HOSPADM

## 2020-05-31 RX ORDER — DEXTROSE MONOHYDRATE 50 MG/ML
100 INJECTION, SOLUTION INTRAVENOUS PRN
Status: DISCONTINUED | OUTPATIENT
Start: 2020-05-31 | End: 2020-05-31 | Stop reason: HOSPADM

## 2020-05-31 RX ORDER — POTASSIUM CHLORIDE 7.45 MG/ML
10 INJECTION INTRAVENOUS PRN
Status: DISCONTINUED | OUTPATIENT
Start: 2020-05-31 | End: 2020-05-31 | Stop reason: HOSPADM

## 2020-05-31 RX ADMIN — SODIUM CHLORIDE 1000 ML: 9 INJECTION, SOLUTION INTRAVENOUS at 00:22

## 2020-05-31 RX ADMIN — SODIUM CHLORIDE 0.1 UNITS/KG/HR: 9 INJECTION, SOLUTION INTRAVENOUS at 00:34

## 2020-05-31 RX ADMIN — SODIUM CHLORIDE: 9 INJECTION, SOLUTION INTRAVENOUS at 03:21

## 2020-05-31 RX ADMIN — SODIUM CHLORIDE 5.2 UNITS/KG/HR: 9 INJECTION, SOLUTION INTRAVENOUS at 03:40

## 2020-05-31 RX ADMIN — INSULIN LISPRO 8 UNITS: 100 INJECTION, SOLUTION INTRAVENOUS; SUBCUTANEOUS at 07:57

## 2020-05-31 RX ADMIN — DEXTROSE AND SODIUM CHLORIDE: 5; 450 INJECTION, SOLUTION INTRAVENOUS at 03:41

## 2020-05-31 RX ADMIN — HYDROCODONE BITARTRATE AND ACETAMINOPHEN 1 TABLET: 5; 325 TABLET ORAL at 03:56

## 2020-05-31 RX ADMIN — INSULIN GLARGINE 24 UNITS: 100 INJECTION, SOLUTION SUBCUTANEOUS at 03:57

## 2020-05-31 RX ADMIN — IOPAMIDOL 100 ML: 755 INJECTION, SOLUTION INTRAVENOUS at 00:04

## 2020-05-31 RX ADMIN — POTASSIUM CHLORIDE 40 MEQ: 20 TABLET, EXTENDED RELEASE ORAL at 10:17

## 2020-05-31 ASSESSMENT — PAIN SCALES - GENERAL
PAINLEVEL_OUTOF10: 3
PAINLEVEL_OUTOF10: 10
PAINLEVEL_OUTOF10: 0
PAINLEVEL_OUTOF10: 0

## 2020-05-31 ASSESSMENT — PAIN DESCRIPTION - PAIN TYPE: TYPE: ACUTE PAIN

## 2020-05-31 ASSESSMENT — PAIN DESCRIPTION - DESCRIPTORS: DESCRIPTORS: ACHING;TENDER;SHARP

## 2020-05-31 ASSESSMENT — PAIN DESCRIPTION - LOCATION: LOCATION: ABDOMEN;FLANK;BACK

## 2020-05-31 NOTE — PROGRESS NOTES
Discharge instructions provided to patient. Patient verbalized understanding of instructions. Peripheral IVs removed. Patient independently gets dressed without difficulty. RN obtains wheelchair for transport. Patient transfers to wheelchair independently with all belongings, including jewelry. Patient wheeled to entrance of hospital where her mother picked her up. Patient safely transferred to her mother's vehicle. Condition stable at time of discharge.

## 2020-05-31 NOTE — H&P
DIRECTED UP TO 6 TIMES A DAY 5/7/20   MIKE Vasquez   FREESTYLE LITE strip Checks BS four times a day before meals and at bedtime and also as needed for high and low blood sugar 5/7/20   MIKE Vasquez   acetone, urine, test strip Use daily as directed if bs >250 x2 or illness 5/7/20   MIKE Vasquez   insulin lispro, 1 Unit Dial, (HUMALOG KWIKPEN) 100 UNIT/ML SOPN 3 times a day before meals using carb ratio 1u:10g + sliding scale. Max 50 units daily. Dispense 15 pens, R-5 KAUR 5/7/20   MIKE Vasquez   insulin glargine Estrella Rodriguez) 100 UNIT/ML injection pen Inject 28 Units into the skin nightly  Patient taking differently: Inject 28 Units into the skin daily  5/1/20   Roman Manzano MD       Allergies:    Patient has no known allergies. Social History:    reports that she has never smoked. She has never used smokeless tobacco. She reports that she does not drink alcohol or use drugs. Family History:   family history includes Diabetes in her maternal grandmother and paternal grandmother; Stroke in an other family member. PHYSICAL EXAM:  Vitals:  BP (!) 136/99   Pulse 97   Temp 98.5 °F (36.9 °C) (Oral)   Resp 24   Ht 5' 1\" (1.549 m)   Wt 104 lb 12.8 oz (47.5 kg)   LMP 05/07/2020   SpO2 100%   BMI 19.80 kg/m²   General Appearance: alert and oriented to person, place and time and in no acute distress  Skin: warm and dry, turgor not diminished  Head: normocephalic and atraumatic  Eyes: pupils equal, round, and reactive to light, extraocular eye movements intact, conjunctivae normal  Neck: neck supple and non tender without mass   Pulmonary/Chest: clear to auscultation bilaterally- no wheezes, rales or rhonchi, normal air movement, no respiratory distress  Cardiovascular: normal rate, normal S1 and S2 and no M/R/R  Abdomen: soft, diffuse tenderness to palpation, tenderness in right flank bilaterally.   No tenderness over spine  Extremities: no

## 2020-05-31 NOTE — CONSULTS
Medications  Medications Prior to Admission: insulin lispro, 1 Unit Dial, (HUMALOG KWIKPEN) 100 UNIT/ML SOPN, 3 times a day before meals using carb ratio 1u:10g + sliding scale. Max 50 units daily. Dispense 15 pens, R-5 KAUR  insulin glargine (BASAGLAR KWIKPEN) 100 UNIT/ML injection pen, Inject 28 Units into the skin nightly (Patient taking differently: Inject 28 Units into the skin daily )  [DISCONTINUED] gabapentin (NEURONTIN) 100 MG capsule, Take 100 mg by mouth as needed. Insulin Pen Needle (BD PEN NEEDLE SHELBIE U/F) 32G X 4 MM MISC, USE AS DIRECTED UP TO 6 TIMES A DAY  FREESTYLE LITE strip, Checks BS four times a day before meals and at bedtime and also as needed for high and low blood sugar  acetone, urine, test strip, Use daily as directed if bs >250 x2 or illness    Diet/Nutrition   DIET CARB CONTROL; Carb Control: 4 carbs/meal (approximate 1800 kcals/day)    Allergies   Patient has no known allergies. Social History   Tobacco   reports that she has never smoked. She has never used smokeless tobacco.    Alcohol     reports no history of alcohol use. Occupational history :    Family History         Problem Relation Age of Onset    Diabetes Maternal Grandmother     Diabetes Paternal Grandmother     Stroke Other     Thyroid Disease Neg Hx        Sleep History   n/a    ROS     REVIEW OF SYSTEMS:  Constitutional: Negative for fever, chills. Positive for fatigue   HENT: Negative for rhinorrhea, sore throat and trouble swallowing. Eyes: Negative for photophobia and pain. Respiratory: Negative for shortness of breath negative for apnea, cough, chest tightness and wheezing. Cardiovascular: Negative for chest pain, palpitations and leg swelling. Gastrointestinal: Positive for abdominal pain, nausea, vomiting  Endocrine: Positive for polydipsia and polyuria. Genitourinary: Negative for difficulty urinating and dysuria. Musculoskeletal:Negative for back pain, neck pain and neck stiffness. Skin: Negative for rash. Neurological: Negative for dizziness, speech difficulty, weakness, light-headedness and headaches. Psychiatric/Behavioral: Negative for confusion. The patient is not nervous/anxious. Lines and Devices   IV lines in place    Mechanical Ventilation Data   VENT SETTINGS (Comprehensive)  Vent Information  SpO2: 98 %  Additional Respiratory  Assessments  Pulse: 106  Resp: 24  SpO2: 98 %    ABG  Lab Results   Component Value Date    PH 7.395 2020    PCO2 37.7 2020    PO2 50.7 2020    HCO3 22.6 2020    O2SAT 82.6 2020     Lab Results   Component Value Date    MODE RA 2020           Vitals    height is 5' 1\" (1.549 m) and weight is 104 lb 12.8 oz (47.5 kg). Her oral temperature is 98.2 °F (36.8 °C). Her blood pressure is 128/89 and her pulse is 106. Her respiration is 24 and oxygen saturation is 98%.        Temperature Range: Temp: 98.2 °F (36.8 °C) Temp  Av.1 °F (36.7 °C)  Min: 97.6 °F (36.4 °C)  Max: 98.5 °F (36.9 °C)  BP Range:  Systolic (13TEL), WHY:493 , Min:110 , MTC:864     Diastolic (77PRE), QSN:53, Min:70, Max:107    Pulse Range: Pulse  Av.3  Min: 90  Max: 118  Respiration Range: Resp  Av.8  Min: 9  Max: 37  Current Pulse Ox[de-identified]  SpO2: 98 %  24HR Pulse Ox Range:  SpO2  Av.8 %  Min: 98 %  Max: 100 %  Oxygen Amount and Delivery:        I/O (24 Hours)    Patient Vitals for the past 8 hrs:   BP Temp Temp src Pulse Resp SpO2 Weight   20 0900 128/89 -- -- 106 24 98 % --   20 0800 119/82 98.2 °F (36.8 °C) Oral 104 9 98 % --   20 0700 119/87 -- -- 100 13 98 % --   20 0600 125/80 -- -- 97 9 99 % --   05/31/20 0500 131/84 -- -- 98 23 -- --   20 0400 131/87 -- -- 103 26 100 % --   20 0300 (!) 136/99 -- -- 97 24 100 % --   20 0257 -- -- -- 93 -- -- --   20 0256 -- -- -- 94 -- -- --   20 0249 -- -- -- -- -- -- 104 lb 12.8 oz (47.5 kg)       Intake/Output Summary (Last 24 hours) at  05/30/2020    MCV 85.7 05/30/2020    MCH 29.1 05/30/2020    MCHC 34.0 05/30/2020    RDW 11.8 05/30/2020    NRBC 0.0 05/10/2019    SEGSPCT 58 09/29/2013    METASPCT 1.0 04/29/2020    LYMPHOPCT 41.3 05/30/2020    MONOPCT 5.2 05/30/2020    MYELOPCT 1.0 04/29/2020    BASOPCT 0.3 05/30/2020    MONOSABS 0.32 05/30/2020    LYMPHSABS 2.52 05/30/2020    EOSABS 0.05 05/30/2020    BASOSABS 0.02 05/30/2020       BMP   Lab Results   Component Value Date     05/31/2020    K 3.3 05/31/2020    K 4.4 05/30/2020     05/31/2020    CO2 24 05/31/2020    BUN 9 05/31/2020    CREATININE 0.4 05/31/2020    GLUCOSE 123 05/31/2020    CALCIUM 8.6 05/31/2020       LFTS  Lab Results   Component Value Date    ALKPHOS 83 05/30/2020    ALT 20 05/30/2020    AST 32 05/30/2020    PROT 7.3 05/30/2020    BILITOT 0.3 05/30/2020    BILIDIR <0.2 10/28/2019    IBILI see below 10/28/2019    LABALBU 4.0 05/30/2020       INR  No results for input(s): PROTIME, INR in the last 72 hours. APTT  No results for input(s): APTT in the last 72 hours. Lactic Acid  Lab Results   Component Value Date    LACTA 1.5 05/30/2020    LACTA 1.6 05/04/2020    LACTA 2.9 04/29/2020        BNP   No results for input(s): BNP in the last 72 hours. Cultures     No results for input(s): BC in the last 72 hours. No results for input(s): Chandan Don in the last 72 hours. No results for input(s): LABURIN in the last 72 hours. Radiology   Ct Lumbar Spine W Contrast    Result Date: 5/1/2020  This examination and all examinations utilizing ionizing radiation at this facility are done so according to the ALARA (as low as reasonably achievable) principal for radiation dose reduction. Clinical indications: Back pain. COMPARISON: CT abdomen and pelvis March 6, 2020. TECHNIQUE: Axial, sagittal, and coronal computed tomography of the lumbar spine was performed following intravenous contrast administration.  FINDINGS: There is heterogenous enhancement of the recently ruptured ovarian cyst. Prominent periuterine vasculature suggesting an element of pelvic congestion syndrome. Ct Abdomen Pelvis W Iv Contrast Additional Contrast? None    Result Date: 2020  Patient MRN:  [id number] : 1995 Age: 27 years Gender: Female Order Date:  2020 10:00 PM TECHNIQUE/NUMBER OF IMAGES/COMPARISON/CLINICAL HISTORY: CT abdomen pelvis IV contrast After IV contrast axial images were obtained sagittal and coronal reconstructions 304 images IV contrast 100 mL of Isovue-370 Abdominal pain, diffuse abdominal pain for about a week. Patient previous abdominal surgery of cerebral abscess. FINDINGS: There are normal size and enhancement for the liver, spleen and pancreas are. The liver has more upper borderline size. The gallbladder is nondistended. Biliary tree and pancreatic ductal system are not dilated. Adrenals not enlarged. Kidneys have normal size, cortical thickness, cortical enhancement and excretion of the contrast. No obstruction is seen. The Ureters are patent. The bladder is well distended at time the present study. It appears otherwise unremarkable. There are unremarkable appearance for the uterus and for the ovaries. There is no free fluid accumulation in the cul-de-sac. The There are no acute inflammatory changes in the omental mesenteric fat planes, free intraperitoneal air, ascites or indication for bowel obstruction. Appendix is identified and appears unremarkable. Fair amount of fecal content seen throughout the entire colon represent a component of constipation. Lower lung bases the demonstrate no significant findings are. Visualized bone structures are of unremarkable appearance. 1. No acute inflammatory changes in the omental mesenteric fat planes, free intraperitoneal air, ascites or indication for bowel obstruction. 2. Pattern of constipation. 3. The appendix seen and appears unremarkable. 4. No obstructive uropathy.  5. No dilatation biliary

## 2020-05-31 NOTE — PLAN OF CARE
Problem: Falls - Risk of:  Goal: Will remain free from falls  Description: Will remain free from falls  5/31/2020 0309 by Bhavani Verdugo RN  Outcome: Ongoing  5/31/2020 0235 by Bhavani Verdugo RN  Outcome: Ongoing  Goal: Absence of physical injury  Description: Absence of physical injury  5/31/2020 0309 by Bhavani Verdugo RN  Outcome: Ongoing  5/31/2020 0235 by Bhavani Verdugo RN  Outcome: Ongoing       Problem: Discharge Planning:  Goal: Discharged to appropriate level of care  Description: Discharged to appropriate level of care  Outcome: Ongoing     Problem: Serum Glucose Level - Abnormal:  Goal: Ability to maintain appropriate glucose levels will improve  Description: Ability to maintain appropriate glucose levels will improve  Outcome: Ongoing     Problem: Coping:  Goal: Ability to adjust to condition or change in health will improve  Description: Ability to adjust to condition or change in health will improve  Outcome: Ongoing     Problem: Fluid Volume:  Goal: Ability to maintain a balanced intake and output will improve  Description: Ability to maintain a balanced intake and output will improve  Outcome: Ongoing     Problem: Health Behavior:  Goal: Ability to identify and utilize available resources and services will improve  Description: Ability to identify and utilize available resources and services will improve  Outcome: Ongoing  Goal: Ability to manage health-related needs will improve  Description: Ability to manage health-related needs will improve  Outcome: Ongoing     Problem: Metabolic:  Goal: Ability to maintain appropriate glucose levels will improve  Description: Ability to maintain appropriate glucose levels will improve  Outcome: Ongoing     Problem: Nutritional:  Goal: Maintenance of adequate nutrition will improve  Description: Maintenance of adequate nutrition will improve  Outcome: Ongoing  Goal: Progress toward achieving an optimal weight will improve  Description: Progress toward achieving
care  Description: Discharged to appropriate level of care  5/31/2020 0309 by Patricia Willoughby RN  Outcome: Ongoing     Problem: Health Behavior:  Goal: Ability to identify and utilize available resources and services will improve  Description: Ability to identify and utilize available resources and services will improve  5/31/2020 0937 by Salvatore Haines RN  Outcome: Ongoing  5/31/2020 0309 by Patricia Willoughby RN  Outcome: Ongoing  Goal: Ability to manage health-related needs will improve  Description: Ability to manage health-related needs will improve  5/31/2020 0937 by Salvatore Haines RN  Outcome: Ongoing  5/31/2020 0309 by Patricia Willoughby RN  Outcome: Ongoing     Problem: Nutritional:  Goal: Maintenance of adequate nutrition will improve  Description: Maintenance of adequate nutrition will improve  5/31/2020 0937 by Salvatore Haines RN  Outcome: Ongoing  5/31/2020 0309 by Patricia Willoughby RN  Outcome: Ongoing  Goal: Progress toward achieving an optimal weight will improve  Description: Progress toward achieving an optimal weight will improve  5/31/2020 0309 by Patricia Willoughby RN  Outcome: Ongoing     Problem: Pain:  Goal: Control of acute pain  Description: Control of acute pain  5/31/2020 0309 by Patricia Willoughby RN  Outcome: Ongoing  Goal: Control of chronic pain  Description: Control of chronic pain  5/31/2020 0309 by Patricia Willoughby RN  Outcome: Ongoing

## 2020-05-31 NOTE — DISCHARGE SUMMARY
Kidneys have normal size, cortical thickness, cortical enhancement and excretion of the contrast. No obstruction is seen. The Ureters are patent. The bladder is well distended at time the present study. It appears otherwise unremarkable. There are unremarkable appearance for the uterus and for the ovaries. There is no free fluid accumulation in the cul-de-sac. The There are no acute inflammatory changes in the omental mesenteric fat planes, free intraperitoneal air, ascites or indication for bowel obstruction. Appendix is identified and appears unremarkable. Fair amount of fecal content seen throughout the entire colon represent a component of constipation. Lower lung bases the demonstrate no significant findings are. Visualized bone structures are of unremarkable appearance. 1. No acute inflammatory changes in the omental mesenteric fat planes, free intraperitoneal air, ascites or indication for bowel obstruction. 2. Pattern of constipation. 3. The appendix seen and appears unremarkable. 4. No obstructive uropathy. 5. No dilatation biliary tree/pancreatic ductal system. 6. Unremarkable appearance for the uterus and ovaries on this study.       Patient Instructions:      Medication List      START taking these medications    polyethylene glycol 17 GM/SCOOP powder  Commonly known as:  GLYCOLAX  Take 17 g by mouth daily as needed (CONSTIPATION)        CHANGE how you take these medications    Venkataglar KwikPen 100 UNIT/ML injection pen  Generic drug:  insulin glargine  Inject 28 Units into the skin nightly  What changed:  when to take this        CONTINUE taking these medications    acetone (urine) test strip  Use daily as directed if bs >250 x2 or illness     BD Pen Needle Camila U/F 32G X 4 MM Misc  Generic drug:  Insulin Pen Needle  USE AS DIRECTED UP TO 6 TIMES A DAY     FREESTYLE LITE strip  Generic drug:  blood glucose test strips  Checks BS four times a day before meals and at bedtime and also as needed for high and low blood sugar     gabapentin 100 MG capsule  Commonly known as:  NEURONTIN     insulin lispro (1 Unit Dial) 100 UNIT/ML Sopn  Commonly known as:  HumaLOG KwikPen  3 times a day before meals using carb ratio 1u:10g + sliding scale. Max 50 units daily.     Dispense 15 pens, R-5 KAUR           Where to Get Your Medications      These medications were sent to [address], [address] [place] [address]    Phone:  [phone]   · polyethylene glycol 17 GM/SCOOP powder           Note that more than 30 minutes was spent in preparing discharge papers, discussing discharge with patient, medication review, etc.    Signed:  Electronically signed by Anya Vincent MD on 5/31/2020 at 9:42 AM

## 2020-05-31 NOTE — ED PROVIDER NOTES
Department of Emergency Medicine   ED  Provider Note  Admit Date/RoomTime: 5/30/2020  9:24 PM  ED Room: 02/02 5/31/20  2:15 AM EDT    History of Present Illness:   Gaby Armenta is a 27 y.o. female presenting to the ED for diffuse abdominal pain x1 week. Patient reports abdominal pain has been worsening within the last 2 to 3 days and now she is having lower back pain. Patient denies fevers chills nausea vomiting. Patient denies any urinary symptoms. Patient reports it is diffuse abdominal pain that she rates a 10 out of 10. She reports the pain is radiating to her back. Review of Systems:   Pertinent positives and negatives are stated within HPI, all other systems reviewed and are negative.          --------------------------------------------- PAST HISTORY ---------------------------------------------  Past Medical History:  has a past medical history of Bleeding at insertion site, Common femoral artery injury, right, initial encounter, and DM type 1 (diabetes mellitus, type 1) ([facility] Past Surgical History:  has a past surgical history that includes Abdomen surgery (N/A, 5/9/2019) and Bartholin gland cyst excision. Social History:  reports that she has never smoked. She has never used smokeless tobacco. She reports that she does not drink alcohol or use drugs. Family History: family history includes Diabetes in her maternal grandmother and paternal grandmother; Stroke in an other family member. Unless otherwise noted, family history is non contributory    The patients home medications have been reviewed. Allergies: Patient has no known allergies.     -------------------------------------------------- RESULTS -------------------------------------------------  All laboratory and radiology results have been personally reviewed by myself   LABS:  Results for orders placed or performed during the hospital encounter of 05/30/20   CBC Auto Differential   Result Value Ref Range    WBC arrival patient with an elevated glucose in the 600s. Patient was found to be acidotic and ketones serum is elevated as well. Patient did not have an anion gap. Patient was given IV fluids and started on a insulin drip. Informed the patient that she will need to be admitted for DKA. Patient agrees to be admitted and reports she would like to go to [facility]. Decision to admit patient. Dr. Cuate Cedillo called back at [phone]. He reported patient can be admitted. ICU physician is called out at 12:07 AM.  Dr. Marisela Ross called back regarding the patient. She reported giving the patient more IV fluids and starting the patient on insulin drip. She agreed to accept the patient. Patient has been accepted to [facility] ICU. Care has been transferred. Counseling: The emergency provider has spoken with the patient and discussed todays results, in addition to providing specific details for the plan of care and counseling regarding the diagnosis and prognosis. Questions are answered at this time and they are agreeable with the plan.      --------------------------------- IMPRESSION AND DISPOSITION ---------------------------------    IMPRESSION  1. Diabetic ketoacidosis without coma associated with type [facility] diabetes mellitus ([facility]    2. Hyperglycemia    3.  Generalized abdominal pain        DISPOSITION  Disposition: Discharge to home  Patient condition is stable              Vanda Drummond MD  05/31/20 0228

## 2020-06-01 LAB — MRSA CULTURE ONLY: NORMAL

## 2020-06-02 LAB — URINE CULTURE, ROUTINE: NORMAL

## 2020-06-29 ENCOUNTER — APPOINTMENT (OUTPATIENT)
Dept: GENERAL RADIOLOGY | Age: 25
DRG: 420 | End: 2020-06-29
Payer: COMMERCIAL

## 2020-06-29 ENCOUNTER — HOSPITAL ENCOUNTER (INPATIENT)
Age: 25
LOS: 1 days | Discharge: HOME | DRG: 420 | End: 2020-06-30
Attending: EMERGENCY MEDICINE | Admitting: STUDENT IN AN ORGANIZED HEALTH CARE EDUCATION/TRAINING PROGRAM
Payer: COMMERCIAL

## 2020-06-29 PROBLEM — E10.10 DIABETIC KETOACIDOSIS WITHOUT COMA ASSOCIATED WITH TYPE 1 DIABETES MELLITUS (HCC): Status: ACTIVE | Noted: 2020-05-04

## 2020-06-29 LAB
ALBUMIN SERPL-MCNC: 4.6 G/DL (ref 3.5–5.2)
ALP BLD-CCNC: 77 U/L (ref 35–104)
ALT SERPL-CCNC: 21 U/L (ref 0–32)
ANION GAP SERPL CALCULATED.3IONS-SCNC: 13 MMOL/L (ref 7–16)
ANION GAP SERPL CALCULATED.3IONS-SCNC: 21 MMOL/L (ref 7–16)
AST SERPL-CCNC: 28 U/L (ref 0–31)
BACTERIA: ABNORMAL /HPF
BASOPHILS ABSOLUTE: 0.04 E9/L (ref 0–0.2)
BASOPHILS RELATIVE PERCENT: 0.5 % (ref 0–2)
BETA-HYDROXYBUTYRATE: >4.5 MMOL/L (ref 0.02–0.27)
BILIRUB SERPL-MCNC: 0.6 MG/DL (ref 0–1.2)
BILIRUBIN URINE: ABNORMAL
BLOOD, URINE: NEGATIVE
BUN BLDV-MCNC: 19 MG/DL (ref 6–20)
BUN BLDV-MCNC: 25 MG/DL (ref 6–20)
CALCIUM SERPL-MCNC: 10.1 MG/DL (ref 8.6–10.2)
CALCIUM SERPL-MCNC: 8.8 MG/DL (ref 8.6–10.2)
CHLORIDE BLD-SCNC: 91 MMOL/L (ref 98–107)
CHLORIDE BLD-SCNC: 98 MMOL/L (ref 98–107)
CHP ED QC CHECK: NORMAL
CLARITY: CLEAR
CO2: 18 MMOL/L (ref 22–29)
CO2: 21 MMOL/L (ref 22–29)
COLOR: YELLOW
CREAT SERPL-MCNC: 0.5 MG/DL (ref 0.5–1)
CREAT SERPL-MCNC: 0.6 MG/DL (ref 0.5–1)
EOSINOPHILS ABSOLUTE: 0.04 E9/L (ref 0.05–0.5)
EOSINOPHILS RELATIVE PERCENT: 0.5 % (ref 0–6)
EPITHELIAL CELLS, UA: ABNORMAL /HPF
GFR AFRICAN AMERICAN: >60
GFR AFRICAN AMERICAN: >60
GFR NON-AFRICAN AMERICAN: >60 ML/MIN/1.73
GFR NON-AFRICAN AMERICAN: >60 ML/MIN/1.73
GLUCOSE BLD-MCNC: 288 MG/DL
GLUCOSE BLD-MCNC: 366 MG/DL (ref 74–99)
GLUCOSE BLD-MCNC: 377 MG/DL (ref 74–99)
GLUCOSE URINE: >=1000 MG/DL
HCG(URINE) PREGNANCY TEST: NEGATIVE
HCT VFR BLD CALC: 49.3 % (ref 34–48)
HEMOGLOBIN: 16.6 G/DL (ref 11.5–15.5)
IMMATURE GRANULOCYTES #: 0.01 E9/L
IMMATURE GRANULOCYTES %: 0.1 % (ref 0–5)
KETONES, URINE: >=80 MG/DL
LACTIC ACID: 1.6 MMOL/L (ref 0.5–2.2)
LEUKOCYTE ESTERASE, URINE: NEGATIVE
LIPASE: 17 U/L (ref 13–60)
LYMPHOCYTES ABSOLUTE: 3.85 E9/L (ref 1.5–4)
LYMPHOCYTES RELATIVE PERCENT: 49.4 % (ref 20–42)
MAGNESIUM: 1.6 MG/DL (ref 1.6–2.6)
MCH RBC QN AUTO: 28.5 PG (ref 26–35)
MCHC RBC AUTO-ENTMCNC: 33.7 % (ref 32–34.5)
MCV RBC AUTO: 84.7 FL (ref 80–99.9)
METER GLUCOSE: 133 MG/DL (ref 74–99)
METER GLUCOSE: 147 MG/DL (ref 74–99)
METER GLUCOSE: 182 MG/DL (ref 74–99)
METER GLUCOSE: 212 MG/DL (ref 74–99)
METER GLUCOSE: 288 MG/DL (ref 74–99)
METER GLUCOSE: 369 MG/DL (ref 74–99)
METER GLUCOSE: 474 MG/DL (ref 74–99)
MONOCYTES ABSOLUTE: 0.33 E9/L (ref 0.1–0.95)
MONOCYTES RELATIVE PERCENT: 4.2 % (ref 2–12)
NEUTROPHILS ABSOLUTE: 3.53 E9/L (ref 1.8–7.3)
NEUTROPHILS RELATIVE PERCENT: 45.3 % (ref 43–80)
NITRITE, URINE: NEGATIVE
PDW BLD-RTO: 12.1 FL (ref 11.5–15)
PH UA: 6 (ref 5–9)
PH VENOUS: 7.29 (ref 7.35–7.45)
PHOSPHORUS: 2.5 MG/DL (ref 2.5–4.5)
PLATELET # BLD: 285 E9/L (ref 130–450)
PMV BLD AUTO: 11.2 FL (ref 7–12)
POTASSIUM REFLEX MAGNESIUM: 4.4 MMOL/L (ref 3.5–5)
POTASSIUM SERPL-SCNC: 3.6 MMOL/L (ref 3.5–5)
PROTEIN UA: 30 MG/DL
RBC # BLD: 5.82 E12/L (ref 3.5–5.5)
RBC UA: ABNORMAL /HPF (ref 0–2)
SODIUM BLD-SCNC: 130 MMOL/L (ref 132–146)
SODIUM BLD-SCNC: 132 MMOL/L (ref 132–146)
SPECIFIC GRAVITY UA: 1.02 (ref 1–1.03)
TOTAL PROTEIN: 8.7 G/DL (ref 6.4–8.3)
TROPONIN: <0.01 NG/ML (ref 0–0.03)
UROBILINOGEN, URINE: 0.2 E.U./DL
WBC # BLD: 7.8 E9/L (ref 4.5–11.5)
WBC UA: ABNORMAL /HPF (ref 0–5)

## 2020-06-29 PROCEDURE — 05HM33Z INSERTION OF INFUSION DEVICE INTO RIGHT INTERNAL JUGULAR VEIN, PERCUTANEOUS APPROACH: ICD-10-PCS | Performed by: STUDENT IN AN ORGANIZED HEALTH CARE EDUCATION/TRAINING PROGRAM

## 2020-06-29 PROCEDURE — 80053 COMPREHEN METABOLIC PANEL: CPT

## 2020-06-29 PROCEDURE — 36415 COLL VENOUS BLD VENIPUNCTURE: CPT

## 2020-06-29 PROCEDURE — 6360000002 HC RX W HCPCS: Performed by: EMERGENCY MEDICINE

## 2020-06-29 PROCEDURE — 83690 ASSAY OF LIPASE: CPT

## 2020-06-29 PROCEDURE — 82962 GLUCOSE BLOOD TEST: CPT

## 2020-06-29 PROCEDURE — 84100 ASSAY OF PHOSPHORUS: CPT

## 2020-06-29 PROCEDURE — 83605 ASSAY OF LACTIC ACID: CPT

## 2020-06-29 PROCEDURE — 81025 URINE PREGNANCY TEST: CPT

## 2020-06-29 PROCEDURE — 96361 HYDRATE IV INFUSION ADD-ON: CPT

## 2020-06-29 PROCEDURE — 80048 BASIC METABOLIC PNL TOTAL CA: CPT

## 2020-06-29 PROCEDURE — 93005 ELECTROCARDIOGRAM TRACING: CPT | Performed by: EMERGENCY MEDICINE

## 2020-06-29 PROCEDURE — 99223 1ST HOSP IP/OBS HIGH 75: CPT | Performed by: INTERNAL MEDICINE

## 2020-06-29 PROCEDURE — 83735 ASSAY OF MAGNESIUM: CPT

## 2020-06-29 PROCEDURE — 6360000002 HC RX W HCPCS: Performed by: STUDENT IN AN ORGANIZED HEALTH CARE EDUCATION/TRAINING PROGRAM

## 2020-06-29 PROCEDURE — 2500000003 HC RX 250 WO HCPCS: Performed by: EMERGENCY MEDICINE

## 2020-06-29 PROCEDURE — 84484 ASSAY OF TROPONIN QUANT: CPT

## 2020-06-29 PROCEDURE — 82800 BLOOD PH: CPT

## 2020-06-29 PROCEDURE — 2000000000 HC ICU R&B

## 2020-06-29 PROCEDURE — 82010 KETONE BODYS QUAN: CPT

## 2020-06-29 PROCEDURE — 6370000000 HC RX 637 (ALT 250 FOR IP): Performed by: EMERGENCY MEDICINE

## 2020-06-29 PROCEDURE — 81001 URINALYSIS AUTO W/SCOPE: CPT

## 2020-06-29 PROCEDURE — 71045 X-RAY EXAM CHEST 1 VIEW: CPT

## 2020-06-29 PROCEDURE — 99285 EMERGENCY DEPT VISIT HI MDM: CPT

## 2020-06-29 PROCEDURE — 85025 COMPLETE CBC W/AUTO DIFF WBC: CPT

## 2020-06-29 PROCEDURE — 2580000003 HC RX 258: Performed by: EMERGENCY MEDICINE

## 2020-06-29 PROCEDURE — 96360 HYDRATION IV INFUSION INIT: CPT

## 2020-06-29 RX ORDER — MAGNESIUM SULFATE 1 G/100ML
1 INJECTION INTRAVENOUS PRN
Status: DISCONTINUED | OUTPATIENT
Start: 2020-06-29 | End: 2020-06-30

## 2020-06-29 RX ORDER — DEXTROSE MONOHYDRATE 25 G/50ML
12.5 INJECTION, SOLUTION INTRAVENOUS PRN
Status: DISCONTINUED | OUTPATIENT
Start: 2020-06-29 | End: 2020-06-30 | Stop reason: HOSPADM

## 2020-06-29 RX ORDER — 0.9 % SODIUM CHLORIDE 0.9 %
1000 INTRAVENOUS SOLUTION INTRAVENOUS ONCE
Status: COMPLETED | OUTPATIENT
Start: 2020-06-29 | End: 2020-06-29

## 2020-06-29 RX ORDER — SODIUM CHLORIDE 9 MG/ML
INJECTION, SOLUTION INTRAVENOUS CONTINUOUS
Status: DISCONTINUED | OUTPATIENT
Start: 2020-06-29 | End: 2020-06-30 | Stop reason: HOSPADM

## 2020-06-29 RX ORDER — MORPHINE SULFATE 2 MG/ML
1 INJECTION, SOLUTION INTRAMUSCULAR; INTRAVENOUS EVERY 4 HOURS PRN
Status: DISCONTINUED | OUTPATIENT
Start: 2020-06-29 | End: 2020-06-30 | Stop reason: HOSPADM

## 2020-06-29 RX ORDER — POTASSIUM CHLORIDE 7.45 MG/ML
10 INJECTION INTRAVENOUS PRN
Status: DISCONTINUED | OUTPATIENT
Start: 2020-06-29 | End: 2020-06-30

## 2020-06-29 RX ORDER — DEXTROSE AND SODIUM CHLORIDE 5; .45 G/100ML; G/100ML
INJECTION, SOLUTION INTRAVENOUS CONTINUOUS PRN
Status: DISCONTINUED | OUTPATIENT
Start: 2020-06-29 | End: 2020-06-30

## 2020-06-29 RX ADMIN — MORPHINE SULFATE 1 MG: 2 INJECTION, SOLUTION INTRAMUSCULAR; INTRAVENOUS at 21:58

## 2020-06-29 RX ADMIN — SODIUM CHLORIDE 1000 ML: 9 INJECTION, SOLUTION INTRAVENOUS at 17:55

## 2020-06-29 RX ADMIN — SODIUM PHOSPHATE, MONOBASIC, MONOHYDRATE 10 MMOL: 276; 142 INJECTION, SOLUTION INTRAVENOUS at 23:54

## 2020-06-29 RX ADMIN — POTASSIUM CHLORIDE 10 MEQ: 7.46 INJECTION, SOLUTION INTRAVENOUS at 23:02

## 2020-06-29 RX ADMIN — DEXTROSE AND SODIUM CHLORIDE: 5; 450 INJECTION, SOLUTION INTRAVENOUS at 20:40

## 2020-06-29 ASSESSMENT — PAIN DESCRIPTION - PAIN TYPE
TYPE: ACUTE PAIN
TYPE: ACUTE PAIN

## 2020-06-29 ASSESSMENT — PAIN DESCRIPTION - ORIENTATION: ORIENTATION: LOWER

## 2020-06-29 ASSESSMENT — PAIN DESCRIPTION - LOCATION
LOCATION: ABDOMEN
LOCATION: ABDOMEN;BACK

## 2020-06-29 ASSESSMENT — PAIN DESCRIPTION - PROGRESSION: CLINICAL_PROGRESSION: GRADUALLY WORSENING

## 2020-06-29 ASSESSMENT — PAIN SCALES - GENERAL
PAINLEVEL_OUTOF10: 8
PAINLEVEL_OUTOF10: 10

## 2020-06-29 ASSESSMENT — PAIN - FUNCTIONAL ASSESSMENT: PAIN_FUNCTIONAL_ASSESSMENT: PREVENTS OR INTERFERES SOME ACTIVE ACTIVITIES AND ADLS

## 2020-06-29 ASSESSMENT — PAIN DESCRIPTION - FREQUENCY: FREQUENCY: CONTINUOUS

## 2020-06-29 ASSESSMENT — PAIN DESCRIPTION - ONSET: ONSET: ON-GOING

## 2020-06-29 ASSESSMENT — PAIN DESCRIPTION - DESCRIPTORS: DESCRIPTORS: ACHING;SHARP;DISCOMFORT

## 2020-06-29 NOTE — ED PROVIDER NOTES
Patient is a 21-year-old female with past medical history of type 1 diabetes presenting to the emergency department with abdominal pain and concern for DKA. Symptoms severe in severity and constant since onset. Patient states diffuse lower abdominal pain for the last month. She was discharged at the end of May for DKA. She states she is abdominal pain since leaving them. She has not been able eat or drink for the last few days. She has not taken her insulin since Friday and states she has not really eaten or drank it since Friday either. She was noted does not family and states she has not been hungry. She has not been taking her blood sugar at home. She is supposed to start an insulin pump regimen and is going to follow-up with endocrinologist for initiation of the pump. She usually takes 28 units of Basaglar at night and then is on a sliding scale. She has been in DKA in the past has never had to be intubated. She has tried nothing at home for her symptoms. Palpation and attempting to eat makes her symptoms worse. Nothing makes her symptoms better. She has been nauseous but has not vomited. Denies any urinary complaints, no chest pain, shortness of breath. Her last period was greater than 1 month prior. Review of Systems   Constitutional: Negative for chills and fever. Respiratory: Negative for cough, shortness of breath and wheezing. Cardiovascular: Negative for chest pain. Gastrointestinal: Positive for abdominal pain and nausea. Negative for diarrhea and vomiting. Genitourinary: Positive for menstrual problem. Negative for dysuria and frequency. Musculoskeletal: Negative for arthralgias and back pain. Skin: Negative for rash and wound. Neurological: Negative for weakness and headaches. All other systems reviewed and are negative. Physical Exam  Vitals signs and nursing note reviewed. Constitutional:       General: She is not in acute distress. Appearance: She is well-developed. She is not ill-appearing. HENT:      Head: Normocephalic and atraumatic. Mouth/Throat:      Mouth: Mucous membranes are dry. Eyes:      Extraocular Movements: Extraocular movements intact. Neck:      Musculoskeletal: Normal range of motion and neck supple. Cardiovascular:      Rate and Rhythm: Regular rhythm. Tachycardia present. Pulses: Normal pulses. Pulmonary:      Effort: Pulmonary effort is normal. No respiratory distress. Breath sounds: Normal breath sounds. No wheezing or rales. Abdominal:      General: Bowel sounds are normal.      Palpations: Abdomen is soft. Tenderness: There is abdominal tenderness. There is no guarding or rebound. Comments: Mild diffuse tenderness to palpation. No rebound or guarding, no peritoneal signs   Musculoskeletal: Normal range of motion. Skin:     General: Skin is warm and dry. Capillary Refill: Capillary refill takes less than 2 seconds. Neurological:      Mental Status: She is alert and oriented to person, place, and time. Cranial Nerves: No cranial nerve deficit. Sensory: No sensory deficit. Motor: No weakness. Coordination: Coordination normal.          Procedures     MDM  Number of Diagnoses or Management Options  Diabetic ketoacidosis without coma associated with type 1 diabetes mellitus [facility]):   Patient presents emergency room for abdominal pain and concern for DKA. Point-of-care glucose 369. Lab work in DKA work-up initiated. Patient found to have a glucose of 366 as well as an anion gap of 21. She was acidotic with a pH of 7.29 and elevated beta hydroxybutyrate. Potassium stable at 4.4. She is mildly hyponatremic but most likely related to hyperglycemia. DKA treatment initiated. Patient was given 1 L fluids prior DKA order set.   Patient does have a history of hard IV placement and has had many central lines in the past.  One peripheral IV was able to be obtained in the department. Consult placed to intensivist as well as on-call hospitalist for admission. They were agreeable. On-call hospitalist was agreeable placing the central line themselves when patient arrived in the ICU. Educated patient on symptoms, diagnosis and need to stay in the hospital for the rash and care. She verbalized understanding is agreeable to plan. All questions were answered. Patient was admitted. ED Course as of Jun 30 0036   Mon Jun 29, 2020 1946 Spoke with Dr. Brynn Phillips, will accept to ICU    []      ED Course User Index  [] Antonina Thakkarjennifer, DO        ----------------------------------------------- PAST HISTORY --------------------------------------------  Past Medical History:  has a past medical history of Bleeding at insertion site, Common femoral artery injury, right, initial encounter, and DM type 1 (diabetes mellitus, type 1) ([facility] [address] Past Surgical History:  has a past surgical history that includes Abdomen surgery (N/A, 5/9/2019) and Bartholin gland cyst excision. Social History:  reports that she has never smoked. She has never used smokeless tobacco. She reports that she does not drink alcohol or use drugs. Family History: family history includes Diabetes in her maternal grandmother and paternal grandmother; Stroke in an other family member. The patients home medications have been reviewed. Allergies: Patient has no known allergies.     ------------------------------------------------ RESULTS ---------------------------------------------------    LABS:  Results for orders placed or performed during the hospital encounter of 06/29/20   CBC Auto Differential   Result Value Ref Range    WBC 7.8 4.5 - 11.5 E9/L    RBC 5.82 (H) 3.50 - 5.50 E12/L    Hemoglobin 16.6 (H) 11.5 - 15.5 g/dL    Hematocrit 49.3 (H) 34.0 - 48.0 %    MCV 84.7 80.0 - 99.9 fL    MCH 28.5 26.0 - 35.0 pg    MCHC 33.7 32.0 - 34.5 %    RDW 12.1 11.5 - 15.0 fL    Platelets 157 192 - 122 E9/L    MPV 11.2 7.0 - 12.0 fL    Neutrophils % 45.3 43.0 - 80.0 %    Immature Granulocytes % 0.1 0.0 - 5.0 %    Lymphocytes % 49.4 (H) 20.0 - 42.0 %    Monocytes % 4.2 2.0 - 12.0 %    Eosinophils % 0.5 0.0 - 6.0 %    Basophils % 0.5 0.0 - 2.0 %    Neutrophils Absolute 3.53 1.80 - 7.30 E9/L    Immature Granulocytes # 0.01 E9/L    Lymphocytes Absolute 3.85 1.50 - 4.00 E9/L    Monocytes Absolute 0.33 0.10 - 0.95 E9/L    Eosinophils Absolute 0.04 (L) 0.05 - 0.50 E9/L    Basophils Absolute 0.04 0.00 - 0.20 E9/L   Comprehensive Metabolic Panel w/ Reflex to MG   Result Value Ref Range    Sodium 130 (L) 132 - 146 mmol/L    Potassium reflex Magnesium 4.4 3.5 - 5.0 mmol/L    Chloride 91 (L) 98 - 107 mmol/L    CO2 18 (L) 22 - 29 mmol/L    Anion Gap 21 (H) 7 - 16 mmol/L    Glucose 366 (H) 74 - 99 mg/dL    BUN 25 (H) 6 - 20 mg/dL    CREATININE 0.6 0.5 - 1.0 mg/dL    GFR Non-African American >60 >=60 mL/min/1.73    GFR African American >60     Calcium 10.1 8.6 - 10.2 mg/dL    Total Protein 8.7 (H) 6.4 - 8.3 g/dL    Alb 4.6 3.5 - 5.2 g/dL    Total Bilirubin 0.6 0.0 - 1.2 mg/dL    Alkaline Phosphatase 77 35 - 104 U/L    ALT 21 0 - 32 U/L    AST 28 0 - 31 U/L   Lactic Acid, Plasma   Result Value Ref Range    Lactic Acid 1.6 0.5 - 2.2 mmol/L   Lipase   Result Value Ref Range    Lipase 17 13 - 60 U/L   Troponin   Result Value Ref Range    Troponin <0.01 0.00 - 0.03 ng/mL   Urinalysis, reflex to microscopic   Result Value Ref Range    Color, UA Yellow Straw/Yellow    Clarity, UA Clear Clear    Glucose, Ur >=1000 (A) Negative mg/dL    Bilirubin Urine MODERATE (A) Negative    Ketones, Urine >=80 (A) Negative mg/dL    Specific Gravity, UA 1.025 1.005 - 1.030    Blood, Urine Negative Negative    pH, UA 6.0 5.0 - 9.0    Protein, UA 30 (A) Negative mg/dL    Urobilinogen, Urine 0.2 <2.0 E.U./dL    Nitrite, Urine Negative Negative    Leukocyte Esterase, Urine Negative Negative   Pregnancy, Urine   Result Value Ref Range    HCG(Urine) Pregnancy Test NEGATIVE NEGATIVE   Beta-Hydroxybutyrate   Result Value Ref Range    Beta-Hydroxybutyrate >4.50 (H) 0.02 - 0.27 mmol/L   pH, venous   Result Value Ref Range    pH, Christoph 7.29 (L) 7.35 - 7.45   Microscopic Urinalysis   Result Value Ref Range    WBC, UA 5-10 (A) 0 - 5 /HPF    RBC, UA 5-10 (A) 0 - 2 /HPF    Epithelial Cells, UA MODERATE /HPF    Bacteria, UA MANY (A) None Seen /HPF   Phosphorus   Result Value Ref Range    Phosphorus 2.5 2.5 - 4.5 mg/dL   Magnesium   Result Value Ref Range    Magnesium 1.6 1.6 - 2.6 mg/dL   Basic metabolic panel   Result Value Ref Range    Sodium 132 132 - 146 mmol/L    Potassium 3.6 3.5 - 5.0 mmol/L    Chloride 98 98 - 107 mmol/L    CO2 21 (L) 22 - 29 mmol/L    Anion Gap 13 7 - 16 mmol/L    Glucose 377 (H) 74 - 99 mg/dL    BUN 19 6 - 20 mg/dL    CREATININE 0.5 0.5 - 1.0 mg/dL    GFR Non-African American >60 >=60 mL/min/1.73    GFR African American >60     Calcium 8.8 8.6 - 10.2 mg/dL   POCT Glucose   Result Value Ref Range    Meter Glucose 369 (H) 74 - 99 mg/dL   POCT Glucose - every hour   Result Value Ref Range    Glucose 288 mg/dL    QC OK? ok    POCT Glucose   Result Value Ref Range    Meter Glucose 288 (H) 74 - 99 mg/dL   POCT Glucose   Result Value Ref Range    Meter Glucose 212 (H) 74 - 99 mg/dL   POCT Glucose   Result Value Ref Range    Meter Glucose 182 (H) 74 - 99 mg/dL   POCT Glucose   Result Value Ref Range    Meter Glucose 474 (H) 74 - 99 mg/dL   POCT Glucose   Result Value Ref Range    Meter Glucose 133 (H) 74 - 99 mg/dL   POCT Glucose   Result Value Ref Range    Meter Glucose 147 (H) 74 - 99 mg/dL   EKG 12 Lead   Result Value Ref Range    Ventricular Rate 110 BPM    Atrial Rate 110 BPM    P-R Interval 130 ms    QRS Duration 66 ms    Q-T Interval 332 ms    QTc Calculation (Bazett) 449 ms    P Axis 80 degrees    R Axis 67 degrees    T Axis 62 degrees       RADIOLOGY:    All Radiology results interpreted by Radiologist unless otherwise noted.   XR CHEST 1 VW   Final Result   No acute cardiopulmonary process. EKG: This EKG is signed and interpreted by ED Physician. Time:  1815   Rate: 110  Rhythm: Sinus. Interpretation: non-specific EKG. no STEMI. Nonspecific ST wave changes. Normal axis deviation. Baseline artifact. Comparison: changes compared to previous EKG.    ---------------------------- NURSING NOTES AND VITALS REVIEWED -------------------------   The nursing notes within the ED encounter and vital signs as below have been reviewed.    /81   Pulse 110   Temp 98.3 °F (36.8 °C) (Infrared)   Resp 18   Ht 5' 1\" (1.549 m)   Wt 104 lb (47.2 kg)   LMP 05/07/2020   SpO2 98%   BMI 19.65 kg/m²   Oxygen Saturation Interpretation: Normal      ------------------------------------------PROGRESS NOTES -------------------------------------------    ED COURSE MEDICATIONS:                Medications   dextrose 50 % IV solution (has no administration in time range)   potassium chloride 10 mEq/100 mL IVPB (Peripheral Line) (10 mEq Intravenous New Bag 6/29/20 2302)   magnesium sulfate 1 g in dextrose 5% 100 mL IVPB (has no administration in time range)   sodium phosphate 10 mmol in dextrose 5 % 250 mL IVPB (0 mmol Intravenous Stopped 6/30/20 0015)     Or   sodium phosphate 15 mmol in dextrose 5 % 250 mL IVPB ( Intravenous See Alternative 6/30/20 0015)     Or   sodium phosphate 20 mmol in dextrose 5 % 500 mL IVPB ( Intravenous See Alternative 6/30/20 0015)   dextrose 5 % and 0.45 % sodium chloride infusion ( Intravenous New Bag 6/29/20 2040)   0.9 % sodium chloride infusion ( Intravenous Stopped 6/29/20 1919)   insulin regular (HUMULIN R;NOVOLIN R) 100 Units in sodium chloride 0.9 % 100 mL infusion (0.66 Units/hr Intravenous Rate/Dose Change 6/30/20 0000)   morphine (PF) injection 1 mg (1 mg Intravenous Given 6/29/20 2158)   potassium phosphate (monobasic) (K-PHOS) tablet 500 mg (has no administration in time range)   0.9 % sodium chloride bolus (0 mLs Intravenous Stopped 6/29/20 1927)       CONSULTATIONS:            Consultation:  I Spoke with Dr. Romana Hummingbird (Critical care). Discussed case. They will provide consultation. Consultation:  I Spoke with Dr. Shakira Duffy (Medicine). Discussed case. They will admit this patient. Shaw[place] Galindo PROCEDURES:            none. COUNSELING:   I have spoken with the patient and discussed todays results, in addition to providing specific details for the plan of care and counseling regarding the diagnosis and prognosis.     --------------------------------------- IMPRESSION & DISPOSITION --------------------------------     IMPRESSION(s):  1. Uncontrolled type 1 diabetes mellitus with hyperglycemia ([facility] [address]    2. Diabetic ketoacidosis without coma associated with [facility]        This patient's ED course included: a personal history and physicial examination, re-evaluation prior to disposition, multiple bedside re-evaluations, IV medications, cardiac monitoring and continuous pulse oximetry    This patient has remained hemodynamically stable and been closely monitored during their ED course. DISPOSITION:  Disposition: Admit to CCU/ICU. Patient condition is serious. CRITICAL CARE:   35 MINUTES. Please note that the withdrawal or failure to initiate urgent interventions for this patient would likely result in a life threatening deterioration or permanent disability. Accordingly this patient received the above mentioned time, excluding separately billable procedures. END OF PROVIDER NOTE.           ARUN MANCUSO [facility] EastCHRISTOFER Diaz DO  Resident  06/30/20 0036       Candelario Sicard, DO  06/30/20 1513

## 2020-06-29 NOTE — H&P
[facility] History and Physical      CHIEF COMPLAINT:  Abdominal Pain (diffuse lower abd pain, x1 month) and Dehydration (unable to eat or drink d/t nausea, hx of DM1)    History of Present Illness: 28-year-old female with a history of type 1 diabetes presenting with abdominal pain for 1 month. Also complaining of nausea and vomiting and feeling dehydrated. States that she has been taking her insulin 28 units nightly. However has not been taking her Humalog as much due to having continued decreased p.o. intake over the last 2 months with constant GI upset. Has not been able to adequately eat over the last 2 months. Denies any chest pain, shortness of breath, fevers, chills, lower extremity edema. Just had an admission last month for [facility]. Here in the ER patient was found to be in DKA. Anion gap of 21. Bicarb of 18. Beta hydroxybutyrate greater than 4.50. No other past medical history. Patient was started on insulin drip. Admission for DKA. Informant(s) for H&P: Patient    REVIEW OF SYSTEMS:  A comprehensive review of systems was negative except for: what is in the HPI      PMH:  Past Medical History:   Diagnosis Date    Bleeding at insertion site 1/5/2018    Common femoral artery injury, right, initial encounter 1/5/2018    DM type 1 (diabetes mellitus, type 1) ([facility] [address]        Surgical History:  Past Surgical History:   Procedure Laterality Date    ABDOMEN SURGERY N/A 5/9/2019    INCISION AND DRAINAGE OF SUPRAPUBIC ABSESS performed by Durga Mercer MD at [address]      last yr       Medications Prior to Admission:    Prior to Admission medications    Medication Sig Start Date End Date Taking?  Authorizing Provider   polyethylene glycol (GLYCOLAX) 17 GM/SCOOP powder Take 17 g by mouth daily as needed (CONSTIPATION) 5/31/20 6/30/20  Little Baumgarten, MD   Insulin Pen Needle (BD PEN NEEDLE HSELBIE U/F) 32G X 4 MM MISC USE AS DIRECTED UP TO 6 TIMES A DAY 5/7/20   Canas Lulas, APRN - CNS   FREESTYLE LITE strip Checks BS four times a day before meals and at bedtime and also as needed for high and low blood sugar 5/7/20   Canas Lulas, APRN - CNS   acetone, urine, test strip Use daily as directed if bs >250 x2 or illness 5/7/20   Canas Lulas, APRN - CNS   insulin lispro, 1 Unit Dial, (HUMALOG KWIKPEN) 100 UNIT/ML SOPN 3 times a day before meals using carb ratio 1u:10g + sliding scale. Max 50 units daily. Dispense 15 pens, R-5 KAUR 5/7/20   Canas Lulas, APRN - CNS   insulin glargine [facility]) 100 UNIT/ML injection pen Inject 28 Units into the skin nightly  Patient taking differently: Inject 28 Units into the skin daily  5/1/20   Keisha Dela Cruz MD       Allergies:    Patient has no known allergies. Social History:    reports that she has never smoked. She has never used smokeless tobacco. She reports that she does not drink alcohol or use drugs. Family History:   family history includes Diabetes in her maternal grandmother and paternal grandmother; Stroke in an other family member.        PHYSICAL EXAM:  Vitals:  BP (!) 139/91   Pulse 109   Temp 97.1 °F (36.2 °C) (Skin)   Resp 16   Ht 5' 1\" (1.549 m)   Wt 104 lb (47.2 kg)   SpO2 100%   BMI 19.65 kg/m²   General Appearance: alert and oriented to person, place and time and in no acute distress  Skin: warm and dry  Head: normocephalic and atraumatic  Eyes: pupils equal, round, and reactive to light, extraocular eye movements intact, conjunctivae normal  Neck: neck supple and non tender without mass   Pulmonary/Chest: clear to auscultation bilaterally- no wheezes, rales or rhonchi, normal air movement, no respiratory distress  Cardiovascular: normal rate, normal S1 and S2 and no carotid bruits  Abdomen: Soft, tenderness to palpation throughout  Extremities: no cyanosis, no clubbing and no edema  Neurologic: no cranial nerve deficit and speech normal    LABS:  Recent Labs     06/29/20  1750 06/29/20  1918   *  --    K 4.4  --    CL 91*  --    CO2 18*  --    BUN 25*  --    CREATININE 0.6  --    GLUCOSE 366* 288   CALCIUM 10.1  --        Recent Labs     06/29/20  1750   WBC 7.8   RBC 5.82*   HGB 16.6*   HCT 49.3*   MCV 84.7   MCH 28.5   MCHC 33.7   RDW 12.1      MPV 11.2     No results for input(s): POCGLU in the last 72 hours. Radiology:   No orders to display     ASSESSMENT:      Principal Problem:    Diabetic ketoacidosis without coma associated with type 1 diabetes mellitus ([facility] [address]  Active Problems:    Diabetes mellitus type 1, uncontrolled ([facility] [address]    DKA, type 1, not at goal [facility])    Hyperglycemia  Resolved Problems:    * No resolved hospital problems. *    PLAN:  1. DKA: Insulin drip, DKA protocol overall. Will bridge once anion gap was closed x2. Admission the MICU for frequent blood draws and careful monitoring. Consult endocrinology. Patient is uncontrolled and has recurrent admissions for this. Will place central line. Patient is a tough IV stick and needs multiple fluids running as well as insulin.     Code Status: Full code  DVT prophylaxis: Lovenox    Electronically signed by Bambi Chand MD on 6/29/2020 at 6:55 PM

## 2020-06-30 VITALS
BODY MASS INDEX: 17.27 KG/M2 | HEIGHT: 61 IN | HEART RATE: 106 BPM | DIASTOLIC BLOOD PRESSURE: 75 MMHG | OXYGEN SATURATION: 99 % | WEIGHT: 91.49 LBS | TEMPERATURE: 98.3 F | SYSTOLIC BLOOD PRESSURE: 99 MMHG | RESPIRATION RATE: 16 BRPM

## 2020-06-30 LAB
ANION GAP SERPL CALCULATED.3IONS-SCNC: 12 MMOL/L (ref 7–16)
ANION GAP SERPL CALCULATED.3IONS-SCNC: 12 MMOL/L (ref 7–16)
BUN BLDV-MCNC: 14 MG/DL (ref 6–20)
BUN BLDV-MCNC: 17 MG/DL (ref 6–20)
CALCIUM SERPL-MCNC: 8.7 MG/DL (ref 8.6–10.2)
CALCIUM SERPL-MCNC: 8.8 MG/DL (ref 8.6–10.2)
CHLORIDE BLD-SCNC: 102 MMOL/L (ref 98–107)
CHLORIDE BLD-SCNC: 102 MMOL/L (ref 98–107)
CO2: 21 MMOL/L (ref 22–29)
CO2: 22 MMOL/L (ref 22–29)
CREAT SERPL-MCNC: 0.5 MG/DL (ref 0.5–1)
CREAT SERPL-MCNC: 0.5 MG/DL (ref 0.5–1)
EKG ATRIAL RATE: 110 BPM
EKG P AXIS: 80 DEGREES
EKG P-R INTERVAL: 130 MS
EKG Q-T INTERVAL: 332 MS
EKG QRS DURATION: 66 MS
EKG QTC CALCULATION (BAZETT): 449 MS
EKG R AXIS: 67 DEGREES
EKG T AXIS: 62 DEGREES
EKG VENTRICULAR RATE: 110 BPM
GFR AFRICAN AMERICAN: >60
GFR AFRICAN AMERICAN: >60
GFR NON-AFRICAN AMERICAN: >60 ML/MIN/1.73
GFR NON-AFRICAN AMERICAN: >60 ML/MIN/1.73
GLUCOSE BLD-MCNC: 151 MG/DL (ref 74–99)
GLUCOSE BLD-MCNC: 173 MG/DL (ref 74–99)
HCT VFR BLD CALC: 37.1 % (ref 34–48)
HEMOGLOBIN: 12.9 G/DL (ref 11.5–15.5)
MAGNESIUM: 1.6 MG/DL (ref 1.6–2.6)
MAGNESIUM: 2.5 MG/DL (ref 1.6–2.6)
MCH RBC QN AUTO: 29 PG (ref 26–35)
MCHC RBC AUTO-ENTMCNC: 34.8 % (ref 32–34.5)
MCV RBC AUTO: 83.4 FL (ref 80–99.9)
METER GLUCOSE: 126 MG/DL (ref 74–99)
METER GLUCOSE: 133 MG/DL (ref 74–99)
METER GLUCOSE: 136 MG/DL (ref 74–99)
METER GLUCOSE: 140 MG/DL (ref 74–99)
METER GLUCOSE: 144 MG/DL (ref 74–99)
METER GLUCOSE: 160 MG/DL (ref 74–99)
METER GLUCOSE: 169 MG/DL (ref 74–99)
METER GLUCOSE: 67 MG/DL (ref 74–99)
PDW BLD-RTO: 11.9 FL (ref 11.5–15)
PHOSPHORUS: 3.4 MG/DL (ref 2.5–4.5)
PHOSPHORUS: 4 MG/DL (ref 2.5–4.5)
PLATELET # BLD: 225 E9/L (ref 130–450)
PMV BLD AUTO: 10.9 FL (ref 7–12)
POTASSIUM SERPL-SCNC: 3.4 MMOL/L (ref 3.5–5)
POTASSIUM SERPL-SCNC: 3.4 MMOL/L (ref 3.5–5)
RBC # BLD: 4.45 E12/L (ref 3.5–5.5)
SODIUM BLD-SCNC: 135 MMOL/L (ref 132–146)
SODIUM BLD-SCNC: 136 MMOL/L (ref 132–146)
WBC # BLD: 6.6 E9/L (ref 4.5–11.5)

## 2020-06-30 PROCEDURE — 80048 BASIC METABOLIC PNL TOTAL CA: CPT

## 2020-06-30 PROCEDURE — 85027 COMPLETE CBC AUTOMATED: CPT

## 2020-06-30 PROCEDURE — 82962 GLUCOSE BLOOD TEST: CPT

## 2020-06-30 PROCEDURE — 36415 COLL VENOUS BLD VENIPUNCTURE: CPT

## 2020-06-30 PROCEDURE — 93010 ELECTROCARDIOGRAM REPORT: CPT | Performed by: INTERNAL MEDICINE

## 2020-06-30 PROCEDURE — 6360000002 HC RX W HCPCS: Performed by: STUDENT IN AN ORGANIZED HEALTH CARE EDUCATION/TRAINING PROGRAM

## 2020-06-30 PROCEDURE — 6370000000 HC RX 637 (ALT 250 FOR IP): Performed by: EMERGENCY MEDICINE

## 2020-06-30 PROCEDURE — 84100 ASSAY OF PHOSPHORUS: CPT

## 2020-06-30 PROCEDURE — 83735 ASSAY OF MAGNESIUM: CPT

## 2020-06-30 PROCEDURE — 6370000000 HC RX 637 (ALT 250 FOR IP): Performed by: INTERNAL MEDICINE

## 2020-06-30 PROCEDURE — 99239 HOSP IP/OBS DSCHRG MGMT >30: CPT | Performed by: INTERNAL MEDICINE

## 2020-06-30 PROCEDURE — 2580000003 HC RX 258: Performed by: INTERNAL MEDICINE

## 2020-06-30 RX ORDER — INSULIN GLARGINE 100 [IU]/ML
28 INJECTION, SOLUTION SUBCUTANEOUS NIGHTLY
Status: DISCONTINUED | OUTPATIENT
Start: 2020-06-30 | End: 2020-06-30 | Stop reason: HOSPADM

## 2020-06-30 RX ORDER — POTASSIUM CHLORIDE 20 MEQ/1
40 TABLET, EXTENDED RELEASE ORAL ONCE
Status: COMPLETED | OUTPATIENT
Start: 2020-06-30 | End: 2020-06-30

## 2020-06-30 RX ORDER — POTASSIUM CHLORIDE 20 MEQ/1
TABLET, EXTENDED RELEASE ORAL
Status: DISCONTINUED
Start: 2020-06-30 | End: 2020-06-30 | Stop reason: HOSPADM

## 2020-06-30 RX ADMIN — INSULIN GLARGINE 28 UNITS: 100 INJECTION, SOLUTION SUBCUTANEOUS at 03:13

## 2020-06-30 RX ADMIN — POTASSIUM CHLORIDE 40 MEQ: 20 TABLET, EXTENDED RELEASE ORAL at 07:40

## 2020-06-30 RX ADMIN — MORPHINE SULFATE 1 MG: 2 INJECTION, SOLUTION INTRAMUSCULAR; INTRAVENOUS at 02:16

## 2020-06-30 RX ADMIN — POTASSIUM PHOSPHATE, MONOBASIC 500 MG: 500 TABLET, SOLUBLE ORAL at 01:31

## 2020-06-30 RX ADMIN — INSULIN LISPRO 2 UNITS: 100 INJECTION, SOLUTION INTRAVENOUS; SUBCUTANEOUS at 07:39

## 2020-06-30 RX ADMIN — DEXTROSE MONOHYDRATE 12.5 G: 25 INJECTION, SOLUTION INTRAVENOUS at 10:32

## 2020-06-30 ASSESSMENT — PAIN SCALES - GENERAL
PAINLEVEL_OUTOF10: 7
PAINLEVEL_OUTOF10: 2
PAINLEVEL_OUTOF10: 0
PAINLEVEL_OUTOF10: 0
PAINLEVEL_OUTOF10: 2

## 2020-06-30 ASSESSMENT — PAIN DESCRIPTION - LOCATION
LOCATION: ABDOMEN
LOCATION: ABDOMEN;BACK

## 2020-06-30 ASSESSMENT — ENCOUNTER SYMPTOMS
EYE REDNESS: 0
BACK PAIN: 0
NAUSEA: 1
CONSTIPATION: 0
EYE PAIN: 0
RHINORRHEA: 0
SHORTNESS OF BREATH: 0
COUGH: 0
VOMITING: 0
BLOOD IN STOOL: 0
SINUS PRESSURE: 0
BACK PAIN: 1
EYE DISCHARGE: 0
SORE THROAT: 0
ABDOMINAL DISTENTION: 0
WHEEZING: 0
ABDOMINAL PAIN: 1
DIARRHEA: 0

## 2020-06-30 ASSESSMENT — PAIN DESCRIPTION - PAIN TYPE
TYPE: CHRONIC PAIN

## 2020-06-30 ASSESSMENT — PAIN DESCRIPTION - FREQUENCY: FREQUENCY: INTERMITTENT

## 2020-06-30 ASSESSMENT — PAIN DESCRIPTION - ONSET: ONSET: ON-GOING

## 2020-06-30 ASSESSMENT — PAIN DESCRIPTION - DESCRIPTORS: DESCRIPTORS: ACHING;SORE;SHARP

## 2020-06-30 ASSESSMENT — PAIN DESCRIPTION - ORIENTATION: ORIENTATION: LOWER

## 2020-06-30 ASSESSMENT — PAIN - FUNCTIONAL ASSESSMENT: PAIN_FUNCTIONAL_ASSESSMENT: PREVENTS OR INTERFERES SOME ACTIVE ACTIVITIES AND ADLS

## 2020-06-30 ASSESSMENT — PAIN DESCRIPTION - PROGRESSION: CLINICAL_PROGRESSION: GRADUALLY WORSENING

## 2020-06-30 NOTE — PATIENT CARE CONFERENCE
Intensive Care Daily Quality Rounding Checklist      ICU Team Members:     ICU Day #: NUMBER: 2    Intubation Date: N/A    Ventilator Day #: N/A    Central Line Insertion Date: N/A        Day #: N/A     Arterial Line Insertion Date: N/A      Day #: N/A    DVT Prophylaxis: Ambulatory    GI Prophylaxis: Diet     Bey Catheter Insertion Date: N/A    Day #: N/A      Continued need (if yes, reason documented and discussed with physician): N/A    Skin Issues/ Wounds and ordered treatment discussed on rounds: No    Goals/ Plans for the Day: transfer to

## 2020-06-30 NOTE — PROGRESS NOTES
Pt. Awake alert and oriented eating lunch, BS re-check 133. Taken off of monitor. Father on his way to pick the pt. Up. Reviewed discharge instructions and discharge medication list with pt. PtRolando elena.

## 2020-06-30 NOTE — DISCHARGE SUMMARY
[facility] Physician Discharge Summary       Flora Liang,   [address] [place] [address]  [place] [id number]  [phone]            Activity level: As tolerated     Dispo: home    Condition on discharge: Stable     Patient ID:  Ivy Roy  [id number]  27 y.o.  1995    Admit date: 6/29/2020    Discharge date and time:  6/30/2020  8:34 AM    Admission Diagnoses: Principal Problem:    Diabetic ketoacidosis without coma associated with type 1 diabetes mellitus ([facility]  Active Problems:    Diabetes mellitus type 1, uncontrolled ([facility]    DKA, type 1, not at goal [facility])    Hyperglycemia  Resolved Problems:    * No resolved hospital problems. *      Discharge Diagnoses: Principal Problem:    Diabetic ketoacidosis without coma associated with type 1 diabetes mellitus ([facility]  Active Problems:    Diabetes mellitus type 1, uncontrolled ([place] [address].)    DKA, type 1, not at goal [facility])    Hyperglycemia  Resolved Problems:    * No resolved hospital problems. *      Consults:  IP CONSULT TO CRITICAL CARE  IP CONSULT TO IV TEAM      Hospital Course:   Patient Ivy Roy is a 27 y.o. presented with N/V abd pain, found to be in DKA, type 1, not at goal [facility]) [E10.10], apparently the patient was having poor oral intake and she stopped taking her short acting insuline despite the fact that her BGM was reading high. We admitted the patient with a diagnosis of DKA type I, treated with IV fluids and insulin drip, anion gap closed and blood glucose was under control, will discharge patient home back on her Lantus and sliding scale insulin. Per patient she was approved for a pump and the pump will be programmed this week.   Patient is to follow-up with her PCP and her endocrinologist      Discharge Exam:    General Appearance: alert and oriented to person, place and time and in no acute distress  Skin: warm and dry  Head: normocephalic and atraumatic  Eyes: pupils equal, round, and reactive to light, extraocular eye 4 MM Misc  Generic drug:  Insulin Pen Needle  USE AS DIRECTED UP TO 6 TIMES A DAY     FREESTYLE LITE strip  Generic drug:  blood glucose test strips  Checks BS four times a day before meals and at bedtime and also as needed for high and low blood sugar     NovoLOG 100 UNIT/ML injection vial  Generic drug:  insulin aspart     polyethylene glycol 17 GM/SCOOP powder  Commonly known as:  GLYCOLAX  Take 17 g by mouth daily as needed (CONSTIPATION)              Note that more than 30 minutes was spent in preparing discharge papers, discussing discharge with patient, medication review, etc.    Signed:  Electronically signed by Sabrina Quan MD on 6/30/2020 at 8:34 AM

## 2020-06-30 NOTE — PROGRESS NOTES
Pt. Awake alert and oriented. Denies any complaints of pain at this time. VSS she ate breakfast without issue and she is resting comfortably. OOB to bathroom without issue.

## 2020-06-30 NOTE — PROCEDURES
Gilberto Victoria is a 27 y.o. female patient. No diagnosis found. Past Medical History:   Diagnosis Date    Bleeding at insertion site 1/5/2018    Common femoral artery injury, right, initial encounter 1/5/2018    DM type 1 (diabetes mellitus, type 1) ([facility])      Blood pressure (!) 134/101, pulse 116, temperature 97.1 °F (36.2 °C), temperature source Skin, resp. rate 16, height 5' 1\" (1.549 m), weight 104 lb (47.2 kg), last menstrual period 05/07/2020, SpO2 100 %. Central Line  Date/Time: 6/29/2020 8:38 PM  Performed by: Markos Perry MD  Authorized by: Markos Perry MD     Consent:     Consent obtained:  Verbal and written    Consent given by:  Patient    Risks discussed:  Arterial puncture, pneumothorax, infection and bleeding  Pre-procedure details:     Hand hygiene: Hand hygiene performed prior to insertion      Sterile barrier technique: All elements of maximal sterile technique followed      Skin preparation:  ChloraPrep    Skin preparation agent: Skin preparation agent completely dried prior to procedure    Anesthesia (see MAR for exact dosages): Anesthesia method:  Local infiltration    Local anesthetic:  Lidocaine 1% w/o epi  Procedure details:     Location:  R internal jugular    Patient position:  Flat    Procedural supplies:  Triple lumen    Catheter size:  7 Fr    Landmarks identified: yes      Ultrasound guidance: yes      Sterile ultrasound techniques: Sterile gel and sterile probe covers were used      Number of attempts:  1    Successful placement: no    Comments:      Collapsible IJ. One attempt was made but it did not pass easily into the IJ. Pt requested to retry a Peripheral due to glucose already dropping to 182. IV team consulted.          Markos Perry MD  6/29/2020

## 2020-06-30 NOTE — CONSULTS
 insulin lispro  0-6 Units Subcutaneous Nightly     dextrose, potassium chloride, magnesium sulfate, sodium phosphate IVPB **OR** sodium phosphate IVPB **OR** sodium phosphate IVPB, dextrose 5 % and 0.45 % NaCl, morphine  IV Drips/Infusions   dextrose 5 % and 0.45 % NaCl Stopped (06/30/20 0506)    sodium chloride Stopped (06/29/20 1919)    insulin Stopped (06/30/20 0506)     Home Medications  Medications Prior to Admission: insulin aspart (NOVOLOG) 100 UNIT/ML injection vial, Inject into the skin 3 times daily (before meals) Per sliding scale  polyethylene glycol (GLYCOLAX) 17 GM/SCOOP powder, Take 17 g by mouth daily as needed (CONSTIPATION)  Insulin Pen Needle (BD PEN NEEDLE SHELBIE U/F) 32G X 4 MM MISC, USE AS DIRECTED UP TO 6 TIMES A DAY  FREESTYLE LITE strip, Checks BS four times a day before meals and at bedtime and also as needed for high and low blood sugar  acetone, urine, test strip, Use daily as directed if bs >250 x2 or illness  insulin glargine (BASAGLAR KWIKPEN) 100 UNIT/ML injection pen, Inject 28 Units into the skin nightly    Diet/Nutrition   DIET CARB CONTROL; Carb Control: 4 carbs/meal (approximate 1800 kcals/day)    Allergies   Patient has no known allergies. Social History   Tobacco   reports that she has never smoked. She has never used smokeless tobacco.    Alcohol     reports no history of alcohol use. Occupational history :    Family History         Problem Relation Age of Onset    Diabetes Maternal Grandmother     Diabetes Paternal Grandmother     Stroke Other     Thyroid Disease Neg Hx        Sleep History   n/a    ROS     REVIEW OF SYSTEMS:  Review of Systems   Constitutional: Negative for chills, diaphoresis, fatigue and fever. HENT: Negative for congestion, ear pain, postnasal drip, rhinorrhea, sinus pressure and sore throat. Eyes: Negative for pain, discharge and redness. Respiratory: Negative for cough, shortness of breath and wheezing.     Cardiovascular: Negative for chest pain, palpitations and leg swelling. Gastrointestinal: Positive for abdominal pain (minor) and nausea. Negative for abdominal distention, blood in stool, constipation, diarrhea and vomiting. Genitourinary: Negative for dysuria, frequency and hematuria. Musculoskeletal: Positive for back pain (Lower Back). Negative for arthralgias, myalgias and neck pain. Skin: Negative for rash and wound. Neurological: Negative for dizziness, weakness, light-headedness, numbness and headaches. Hematological: Negative for adenopathy. All other systems reviewed and are negative. Mechanical Ventilation Data   VENT SETTINGS (Comprehensive)  Vent Information  SpO2: 99 %  Additional Respiratory  Assessments  Pulse: 109  Resp: 18  SpO2: 99 %    ABG  Lab Results   Component Value Date    PH 7.395 2020    PCO2 37.7 2020    PO2 50.7 2020    HCO3 22.6 2020    O2SAT 82.6 2020     Lab Results   Component Value Date    MODE RA 2020           Vitals    height is 5' 1\" (1.549 m) and weight is 104 lb (47.2 kg). Her infrared temperature is 98.5 °F (36.9 °C). Her blood pressure is 112/68 and her pulse is 109. Her respiration is 18 and oxygen saturation is 99%.        Temperature Range: Temp: 98.5 °F (36.9 °C) Temp  Av.1 °F (36.7 °C)  Min: 97.1 °F (36.2 °C)  Max: 98.6 °F (37 °C)  BP Range:  Systolic (71EQN), TXH:170 , Min:99 , FHI:998     Diastolic (93NFG), EGD:79, Min:64, Max:102    Pulse Range: Pulse  Av.3  Min: 107  Max: 135  Respiration Range: Resp  Av.9  Min: 14  Max: 20  Current Pulse Ox[de-identified]  SpO2: 99 %  24HR Pulse Ox Range:  SpO2  Av.4 %  Min: 98 %  Max: 100 %  Oxygen Amount and Delivery:        I/O (24 Hours)    Patient Vitals for the past 8 hrs:   BP Temp Temp src Pulse Resp SpO2   20 0500 112/68 -- -- 109 -- --   20 0400 112/68 98.5 °F (36.9 °C) Infrared 107 18 99 %   20 0300 109/64 -- -- 108 -- --   20 0200 120/87 -- -- 109 -- -- 06/30/20 0100 (!) 122/91 -- -- 118 -- --   06/30/20 0000 117/81 98.3 °F (36.8 °C) Infrared 110 18 98 %   06/29/20 2300 (!) 108/97 -- -- 111 -- --       Intake/Output Summary (Last 24 hours) at 6/30/2020 9111  Last data filed at 6/30/2020 0506  Gross per 24 hour   Intake 2623.78 ml   Output --   Net 2623.78 ml     I/O last 3 completed shifts: In: 1000 [I.V.:1000]  Out: -    Date 06/30/20 0000 - 06/30/20 2359   Shift 3026-7302 3335-7150 9443-5988 24 Hour Total   INTAKE   P.O.(mL/kg/hr) 480   480   I. V.(mL/kg) 0056.5(71.5)   5334.2(84.9)   IV Piggyback(mL/kg) 85(1.8)   85(1.8)   Shift Total(mL/kg) 1623. 8(34.4)   1623. 8(34.4)   OUTPUT   Shift Total(mL/kg)       Weight (kg) 47.2 47.2 47.2 47.2     Patient Vitals for the past 96 hrs (Last 3 readings):   Weight   06/29/20 1648 104 lb (47.2 kg)         Drains/Tubes Outputs  N/a    Exam     PHYSICAL EXAM:    General appearance - alert, well appearing, and in NAD   Mental status - A&O x3 with normal mood, behavior, speech  Eyes - pupils equal and reactive, extraocular eye movements intact, sclera anicteric  Ears - external ear normal  Nose - normal and patent, no erythema, discharge or polyps  Mouth - mucous membranes moist, pharynx normal without lesions  Neck - supple, no significant adenopathy  Chest - LCTAB with no w/r/r, symmetric air entry  Heart - RRR, no m/g/r appreciated, normal S1, S2, no murmurs, rubs, clicks or gallops  Abdomen - soft and nondistended with minor tenderness to palpation in the lower abdomen, no masses or organomegaly  Neurological - no focal findings or movement disorder noted  Extremities - peripheral pulses normal, no clubbing or cyanosis. No edema.   Skin - normal coloration and turgor, no rashes, no suspicious skin lesions noted     Data   Old records and images have been reviewed    Lab Results   CBC     Lab Results   Component Value Date    WBC 6.6 06/30/2020    RBC 4.45 06/30/2020    HGB 12.9 06/30/2020    HCT 37.1 06/30/2020     2020    MCV 83.4 2020    MCH 29.0 2020    MCHC 34.8 2020    RDW 11.9 2020    NRBC 0.0 05/10/2019    SEGSPCT 58 2013    METASPCT 1.0 2020    LYMPHOPCT 49.4 2020    MONOPCT 4.2 2020    MYELOPCT 1.0 2020    BASOPCT 0.5 2020    MONOSABS 0.33 2020    LYMPHSABS 3.85 2020    EOSABS 0.04 2020    BASOSABS 0.04 2020       BMP   Lab Results   Component Value Date     2020    K 3.4 2020    K 4.4 2020     2020    CO2 21 2020    BUN 14 2020    CREATININE 0.5 2020    GLUCOSE 151 2020    CALCIUM 8.7 2020       LFTS  Lab Results   Component Value Date    ALKPHOS 77 2020    ALT 21 2020    AST 28 2020    PROT 8.7 2020    BILITOT 0.6 2020    BILIDIR <0.2 10/28/2019    IBILI see below 10/28/2019    LABALBU 4.6 2020       INR  No results for input(s): PROTIME, INR in the last 72 hours. APTT  No results for input(s): APTT in the last 72 hours. Lactic Acid  Lab Results   Component Value Date    LACTA 1.6 2020    LACTA 1.5 2020    LACTA 1.6 2020        BNP   No results for input(s): BNP in the last 72 hours. Cultures     No results for input(s): BC in the last 72 hours. No results for input(s): Deatra Ducking in the last 72 hours. No results for input(s): LABURIN in the last 72 hours. Radiology   Xr Chest 1 Vw    Result Date: 2020  Patient MRN:  [id number] : 1995 Age: 27 years Gender: Female Order Date:  2020 9:45 PM TECHNIQUE/NUMBER OF IMAGES/COMPARISON/CLINICAL HISTORY: Chest AP 1 image and view Comparison May 5, 2020. The for evaluation of the lung parenchyma. The 61-year-old female patient. Findings: Lungs are normally expanded. No infiltrates, consolidation or pleural effusions. The heart has normal size. Mediastinum appears unremarkable. No acute cardiopulmonary process.        SYSTEMS ASSESSMENT    Neuro   A&O x3, follows commands and responds appropriately to questions     Respiratory   No hx of asthma  Nonsmoker  No O2 requirements since admission  Keep O2 sat 90-92%  Normal CXR    Cardiovascular   No CV hx  Monitor vitals closely    Gastrointestinal   Abdominal pain   - non-focal exam, low suspicion for intraabdominal pathology   - will continue to monitor with serial exams  Zofran prn  PO as tolerated    Renal   No hx of kidney disease  Monitor BMP   - follow electrolytes closely and replete as necessary    Infectious Disease   No infectious processes identified  Will continue to monitor CBC   - WBC 6.6 today    Hematology/Oncology   H&H 12.9/37.1   - earlier measurements likely skewed by dehydration    Endocrine   Hx DMT1   - Managed at home with Basaglar 28 U HS and SSI   - Just received insulin pump, but has not had it calibrated yet   - Follows with endocrinology as an outpatient  Hx DKA and presented with DKA yesterday   - Glucose normalized overnight, insulin drip off and transitioned to SSI   - Gap closed    Social/Spiritual/DNR/Other   CODE STATUS: Full    PLAN:     WEAN PER PROTOCOL:  [] No   [] Yes  [x] N/A    DISCONTINUE ANY LABS:   [x] No   [] Yes    ICU PROPHYLAXIS:  Stress ulcer:  [] PPI Agent  [] X5Cevyy [] Sucralfate  [] Other:  VTE:   [] Enoxaparin  [] Unfract.  Heparin Subcut  [] EPC Cuffs    NUTRITION:  [] NPO [] Tube Feeding (Specify: ) [] TPN  [x] PO (Diet: DIET CARB CONTROL; Carb Control: 4 carbs/meal (approximate 1800 kcals/day))    HOME MEDICATIONS RECONCILED: [] No  [x] Yes    INSULIN DRIP:   [] No   [x] Yes    CONSULTATION NEEDED:  [x] No   [] Yes    FAMILY UPDATED:    [] No   [x] Yes    TRANSFER OUT OF ICU:   [] No   [x] Yes    ADDITIONAL PLAN:    Luiz Leo DO, PGY1.                       6/30/2020, 6:32 AM

## 2020-06-30 NOTE — PROGRESS NOTES
Patient admitted from ER to room 205, placed on monitor, patient oriented to room and unit visiting hours. Patient guide at bedside, reviewed patient rights and responsibilities. MRSA nasal swab obtained. Patient belongings left with patient include, cell phone, purse, shoes, shorts, shirt,and undergarments. Bed alarm on, call light within reach.

## 2020-07-01 ENCOUNTER — VIRTUAL VISIT (OUTPATIENT)
Dept: ENDOCRINOLOGY | Age: 25
End: 2020-07-01
Payer: COMMERCIAL

## 2020-07-01 PROBLEM — E10.65 UNCONTROLLED TYPE 1 DIABETES MELLITUS WITH HYPERGLYCEMIA (HCC): Status: ACTIVE | Noted: 2020-07-01

## 2020-07-01 PROCEDURE — G8419 CALC BMI OUT NRM PARAM NOF/U: HCPCS | Performed by: INTERNAL MEDICINE

## 2020-07-01 PROCEDURE — 1036F TOBACCO NON-USER: CPT | Performed by: INTERNAL MEDICINE

## 2020-07-01 PROCEDURE — 3046F HEMOGLOBIN A1C LEVEL >9.0%: CPT | Performed by: INTERNAL MEDICINE

## 2020-07-01 PROCEDURE — G8427 DOCREV CUR MEDS BY ELIG CLIN: HCPCS | Performed by: INTERNAL MEDICINE

## 2020-07-01 PROCEDURE — 1111F DSCHRG MED/CURRENT MED MERGE: CPT | Performed by: INTERNAL MEDICINE

## 2020-07-01 PROCEDURE — 99214 OFFICE O/P EST MOD 30 MIN: CPT | Performed by: INTERNAL MEDICINE

## 2020-07-01 PROCEDURE — 2022F DILAT RTA XM EVC RTNOPTHY: CPT | Performed by: INTERNAL MEDICINE

## 2020-07-01 NOTE — PROGRESS NOTES
SUPRAPUBIC ABSESS performed by Rajni Chung MD at [address]      last yr     SOCIAL HISTORY   Tobacco:   reports that she has never smoked. She has never used smokeless tobacco.  Alcol:   reports no history of alcohol use. Illicit Drugs:   reports no history of drug use. FAMILY HISTORY   Family History   Problem Relation Age of Onset    Diabetes Maternal Grandmother     Diabetes Paternal Grandmother     Stroke Other     Thyroid Disease Neg Hx      ALLERGIES AND DRUG REACTIONS   No Known Allergies    CURRENT MEDICATIONS     Current Outpatient Medications   Medication Sig Dispense Refill    insulin aspart (NOVOLOG) 100 UNIT/ML injection vial Inject into the skin 3 times daily (before meals) Per sliding scale      Insulin Pen Needle (BD PEN NEEDLE SHELBIE U/F) 32G X 4 MM MISC USE AS DIRECTED UP TO 6 TIMES A  each 5    FREESTYLE LITE strip Checks BS four times a day before meals and at bedtime and also as needed for high and low blood sugar 250 each 5    acetone, urine, test strip Use daily as directed if bs >250 x2 or illness 30 strip 0    insulin glargine (BASAGLAR KWIKPEN) 100 UNIT/ML injection pen Inject 28 Units into the skin nightly 10 pen 3     No current facility-administered medications for this visit. Review of Systems  Constitutional: No fever, no chills, no diaphoresis, no generalized weakness. HEENT: No blurred vision, No sore throat, no ear pain, no hair loss  Neck: denied any neck swelling, difficulty swallowing,   Cardio-pulmonary: No CP, SOB or palpitation, No orthopnea or PND. No cough or wheezing. GI: No N/V/D, no constipation, No abdominal pain, no melena or hematochezia   : Denied any dysuria, hematuria, flank pain, discharge, or incontinence. Skin: denied any rash, ulcer, Hirsute, or hyperpigmentation. MSK: denied any joint deformity, joint pain/swelling, muscle pain, or back pain.   Neuro: + numbness and tingling in lower extremities. OBJECTIVE    There were no vitals taken for this visit. BP Readings from Last 4 Encounters:   06/30/20 99/75   05/31/20 128/89   05/31/20 (!) 157/107   05/07/20 122/68     Wt Readings from Last 6 Encounters:   06/30/20 91 lb 7.9 oz (41.5 kg)   05/31/20 104 lb 12.8 oz (47.5 kg)   05/30/20 107 lb (48.5 kg)   05/07/20 106 lb (48.1 kg)   05/01/20 119 lb (54 kg)   03/13/20 105 lb (47.6 kg)     Physical examination:  Due to this being a TeleHealth encounter, evaluation of the following organ systems is limited: EENT/Resp/CV/GI//MS/Neuro/Skin/Heme-Lymph-Imm. General: awake alert, oriented x3, no abnormal position or movements.   Pulm: move with respiration   Skin: no rash  Psych: normal mood, and affect    Review of Laboratory Data:  I have reviewed the following:  Lab Results   Component Value Date/Time    WBC 6.6 06/30/2020 05:58 AM    RBC 4.45 06/30/2020 05:58 AM    HGB 12.9 06/30/2020 05:58 AM    HCT 37.1 06/30/2020 05:58 AM    MCV 83.4 06/30/2020 05:58 AM    MCH 29.0 06/30/2020 05:58 AM    MCHC 34.8 (H) 06/30/2020 05:58 AM    RDW 11.9 06/30/2020 05:58 AM     06/30/2020 05:58 AM    MPV 10.9 06/30/2020 05:58 AM      Lab Results   Component Value Date/Time     06/30/2020 05:58 AM    K 3.4 (L) 06/30/2020 05:58 AM    K 4.4 06/29/2020 05:50 PM    CO2 21 (L) 06/30/2020 05:58 AM    BUN 14 06/30/2020 05:58 AM    CREATININE 0.5 06/30/2020 05:58 AM    CALCIUM 8.7 06/30/2020 05:58 AM    LABGLOM >60 06/30/2020 05:58 AM    GFRAA >60 06/30/2020 05:58 AM      No results found for: TSH, T4FREE, W5AYGCA, FT3, D2HKRRG, TSI, TPOABS, THGAB  Lab Results   Component Value Date    LABA1C 13.0 05/30/2020    GLUCOSE 151 06/30/2020    MALBCR 204.6 01/23/2018    LABMICR 102.3 01/23/2018    LABCREA 50 01/23/2018     Lab Results   Component Value Date    CHOL 175 01/24/2018    TRIG 106 01/24/2018    HDL 40 01/24/2018     No results found for: VITD25    Medical Records/Labs/Images review:   I personally reviewed and summarized previous records   All labs were reviewed independently     Via Aisha Lalitha Sosa, a 27 y.o.-old female seen in for the following issues     Diabetes Mellitus Type 1    · Patient's diabetes is uncontrolled  · Currently on Basaglar 28 units daily, Novolog before meals 1:10g + ss 1:50>150  · To meet with pump  today  · The patient was advised to continue checking blood sugars 4 times a day before meals and at bedtime  · Pt will bring pump for download in a wk   · Discussed with patient A1c and blood sugar goals   · Patient will need routine diabetes maintenance and prevention    Hypoglycemia   · Encourage to use Humalog Carb ratio of 1u:20g for snacks   · To start insulin pump today     vitD deficiency   · Continue  vitD supplement     Return in about 6 weeks (around 8/12/2020) for DM type 1c. The above issues were reviewed with the patient who understood and agreed with the plan. Thank you for allowing us to participate in the care of this patient. Please do not hesitate to contact us with any additional questions. Diagnosis Orders   1. Uncontrolled type 1 diabetes mellitus with hyperglycemia ([address]     2. Vitamin D deficiency     3. Hypoglycemia         Bushra Castrejon MD  Endocrinologist, [facility])   [address] [facility], [address] [place] [address],[id number]   Phone: [phone]  Fax: [phone]  -------------------------   An electronic signature was used to authenticate this note. Janey Gonzalez MD on 7/1/2020 at 9:35 AM  Services were provided through a video synchronous discussion virtually to substitute for in-person clinic visit.

## 2020-07-01 NOTE — PROGRESS NOTES
Magaly Oliva was read the following message We want to confirm that, for purposes of billing, this is a virtual visit with your provider for which we will submit a claim for reimbursement with your insurance company. You will be responsible for any copays, coinsurance amounts or other amounts not covered by your insurance company. If you do not accept this, unfortunately we will not be able to schedule a virtual visit with the provider. Do you accept?  Marky Ybarra responded YES

## 2020-07-12 ENCOUNTER — HOSPITAL ENCOUNTER (INPATIENT)
Age: 25
LOS: 3 days | Discharge: HOME | DRG: 420 | End: 2020-07-15
Attending: EMERGENCY MEDICINE | Admitting: INTERNAL MEDICINE
Payer: COMMERCIAL

## 2020-07-12 ENCOUNTER — APPOINTMENT (OUTPATIENT)
Dept: GENERAL RADIOLOGY | Age: 25
DRG: 420 | End: 2020-07-12
Payer: COMMERCIAL

## 2020-07-12 LAB
ALBUMIN SERPL-MCNC: 4.7 G/DL (ref 3.5–5.2)
ALP BLD-CCNC: 76 U/L (ref 35–104)
ALT SERPL-CCNC: 22 U/L (ref 0–32)
ANION GAP SERPL CALCULATED.3IONS-SCNC: 25 MMOL/L (ref 7–16)
ANION GAP SERPL CALCULATED.3IONS-SCNC: 31 MMOL/L (ref 7–16)
ANION GAP SERPL CALCULATED.3IONS-SCNC: 35 MMOL/L (ref 7–16)
AST SERPL-CCNC: 23 U/L (ref 0–31)
BACTERIA: ABNORMAL /HPF
BASOPHILS ABSOLUTE: 0.04 E9/L (ref 0–0.2)
BASOPHILS RELATIVE PERCENT: 0.2 % (ref 0–2)
BETA-HYDROXYBUTYRATE: >4.5 MMOL/L (ref 0.02–0.27)
BILIRUB SERPL-MCNC: 0.5 MG/DL (ref 0–1.2)
BILIRUBIN URINE: NEGATIVE
BLOOD, URINE: ABNORMAL
BUN BLDV-MCNC: 17 MG/DL (ref 6–20)
BUN BLDV-MCNC: 21 MG/DL (ref 6–20)
BUN BLDV-MCNC: 21 MG/DL (ref 6–20)
CALCIUM SERPL-MCNC: 10.5 MG/DL (ref 8.6–10.2)
CALCIUM SERPL-MCNC: 9.2 MG/DL (ref 8.6–10.2)
CALCIUM SERPL-MCNC: 9.3 MG/DL (ref 8.6–10.2)
CHLORIDE BLD-SCNC: 101 MMOL/L (ref 98–107)
CHLORIDE BLD-SCNC: 103 MMOL/L (ref 98–107)
CHLORIDE BLD-SCNC: 94 MMOL/L (ref 98–107)
CLARITY: CLEAR
CO2: 10 MMOL/L (ref 22–29)
CO2: 4 MMOL/L (ref 22–29)
CO2: 6 MMOL/L (ref 22–29)
COLOR: ABNORMAL
CREAT SERPL-MCNC: 0.7 MG/DL (ref 0.5–1)
EKG ATRIAL RATE: 127 BPM
EKG P AXIS: 81 DEGREES
EKG P-R INTERVAL: 144 MS
EKG Q-T INTERVAL: 322 MS
EKG QRS DURATION: 64 MS
EKG QTC CALCULATION (BAZETT): 467 MS
EKG R AXIS: 67 DEGREES
EKG T AXIS: 62 DEGREES
EKG VENTRICULAR RATE: 127 BPM
EOSINOPHILS ABSOLUTE: 0 E9/L (ref 0.05–0.5)
EOSINOPHILS RELATIVE PERCENT: 0 % (ref 0–6)
EPITHELIAL CELLS, UA: ABNORMAL /HPF
GFR AFRICAN AMERICAN: >60
GFR NON-AFRICAN AMERICAN: >60 ML/MIN/1.73
GLUCOSE BLD-MCNC: 372 MG/DL (ref 74–99)
GLUCOSE BLD-MCNC: 508 MG/DL (ref 74–99)
GLUCOSE BLD-MCNC: 589 MG/DL (ref 74–99)
GLUCOSE URINE: >=1000 MG/DL
HCG, URINE, POC: NEGATIVE
HCT VFR BLD CALC: 41.8 % (ref 34–48)
HEMOGLOBIN: 13.3 G/DL (ref 11.5–15.5)
IMMATURE GRANULOCYTES #: 0.11 E9/L
IMMATURE GRANULOCYTES %: 0.6 % (ref 0–5)
KETONES, URINE: >=80 MG/DL
LACTIC ACID: 2.9 MMOL/L (ref 0.5–2.2)
LACTIC ACID: 4.5 MMOL/L (ref 0.5–2.2)
LEUKOCYTE ESTERASE, URINE: NEGATIVE
LIPASE: 8 U/L (ref 13–60)
LYMPHOCYTES ABSOLUTE: 2.22 E9/L (ref 1.5–4)
LYMPHOCYTES RELATIVE PERCENT: 12.4 % (ref 20–42)
Lab: NORMAL
MAGNESIUM: 1.8 MG/DL (ref 1.6–2.6)
MAGNESIUM: 1.9 MG/DL (ref 1.6–2.6)
MCH RBC QN AUTO: 28.4 PG (ref 26–35)
MCHC RBC AUTO-ENTMCNC: 31.8 % (ref 32–34.5)
MCV RBC AUTO: 89.3 FL (ref 80–99.9)
METER GLUCOSE: 283 MG/DL (ref 74–99)
METER GLUCOSE: 317 MG/DL (ref 74–99)
METER GLUCOSE: 334 MG/DL (ref 74–99)
METER GLUCOSE: 363 MG/DL (ref 74–99)
METER GLUCOSE: 407 MG/DL (ref 74–99)
METER GLUCOSE: >500 MG/DL (ref 74–99)
METER GLUCOSE: >500 MG/DL (ref 74–99)
MONOCYTES ABSOLUTE: 0.98 E9/L (ref 0.1–0.95)
MONOCYTES RELATIVE PERCENT: 5.5 % (ref 2–12)
NEGATIVE QC PASS/FAIL: NORMAL
NEUTROPHILS ABSOLUTE: 14.53 E9/L (ref 1.8–7.3)
NEUTROPHILS RELATIVE PERCENT: 81.3 % (ref 43–80)
NITRITE, URINE: NEGATIVE
PDW BLD-RTO: 12.2 FL (ref 11.5–15)
PH UA: 5.5 (ref 5–9)
PH VENOUS: 7.04 (ref 7.35–7.45)
PHOSPHORUS: 2.9 MG/DL (ref 2.5–4.5)
PHOSPHORUS: 5.2 MG/DL (ref 2.5–4.5)
PLATELET # BLD: 322 E9/L (ref 130–450)
PMV BLD AUTO: 11.3 FL (ref 7–12)
POSITIVE QC PASS/FAIL: NORMAL
POTASSIUM REFLEX MAGNESIUM: 4.7 MMOL/L (ref 3.5–5)
POTASSIUM REFLEX MAGNESIUM: 5.4 MMOL/L (ref 3.5–5)
POTASSIUM SERPL-SCNC: 4.3 MMOL/L (ref 3.5–5)
PROTEIN UA: NEGATIVE MG/DL
RBC # BLD: 4.68 E12/L (ref 3.5–5.5)
RBC UA: ABNORMAL /HPF (ref 0–2)
SODIUM BLD-SCNC: 135 MMOL/L (ref 132–146)
SODIUM BLD-SCNC: 136 MMOL/L (ref 132–146)
SODIUM BLD-SCNC: 138 MMOL/L (ref 132–146)
SPECIFIC GRAVITY UA: 1.02 (ref 1–1.03)
TOTAL PROTEIN: 8.4 G/DL (ref 6.4–8.3)
TRICHOMONAS: PRESENT /HPF
UROBILINOGEN, URINE: 0.2 E.U./DL
WBC # BLD: 17.9 E9/L (ref 4.5–11.5)
WBC UA: ABNORMAL /HPF (ref 0–5)

## 2020-07-12 PROCEDURE — 81001 URINALYSIS AUTO W/SCOPE: CPT

## 2020-07-12 PROCEDURE — 85025 COMPLETE CBC W/AUTO DIFF WBC: CPT

## 2020-07-12 PROCEDURE — 84100 ASSAY OF PHOSPHORUS: CPT

## 2020-07-12 PROCEDURE — 83605 ASSAY OF LACTIC ACID: CPT

## 2020-07-12 PROCEDURE — 82800 BLOOD PH: CPT

## 2020-07-12 PROCEDURE — 2500000003 HC RX 250 WO HCPCS: Performed by: STUDENT IN AN ORGANIZED HEALTH CARE EDUCATION/TRAINING PROGRAM

## 2020-07-12 PROCEDURE — 80048 BASIC METABOLIC PNL TOTAL CA: CPT

## 2020-07-12 PROCEDURE — 71045 X-RAY EXAM CHEST 1 VIEW: CPT

## 2020-07-12 PROCEDURE — 2580000003 HC RX 258: Performed by: STUDENT IN AN ORGANIZED HEALTH CARE EDUCATION/TRAINING PROGRAM

## 2020-07-12 PROCEDURE — 36592 COLLECT BLOOD FROM PICC: CPT

## 2020-07-12 PROCEDURE — 2000000000 HC ICU R&B

## 2020-07-12 PROCEDURE — 83735 ASSAY OF MAGNESIUM: CPT

## 2020-07-12 PROCEDURE — 2500000003 HC RX 250 WO HCPCS: Performed by: INTERNAL MEDICINE

## 2020-07-12 PROCEDURE — 80307 DRUG TEST PRSMV CHEM ANLYZR: CPT

## 2020-07-12 PROCEDURE — 94761 N-INVAS EAR/PLS OXIMETRY MLT: CPT

## 2020-07-12 PROCEDURE — 36415 COLL VENOUS BLD VENIPUNCTURE: CPT

## 2020-07-12 PROCEDURE — 87081 CULTURE SCREEN ONLY: CPT

## 2020-07-12 PROCEDURE — 93005 ELECTROCARDIOGRAM TRACING: CPT | Performed by: STUDENT IN AN ORGANIZED HEALTH CARE EDUCATION/TRAINING PROGRAM

## 2020-07-12 PROCEDURE — 96374 THER/PROPH/DIAG INJ IV PUSH: CPT

## 2020-07-12 PROCEDURE — 99223 1ST HOSP IP/OBS HIGH 75: CPT | Performed by: INTERNAL MEDICINE

## 2020-07-12 PROCEDURE — 82010 KETONE BODYS QUAN: CPT

## 2020-07-12 PROCEDURE — 6370000000 HC RX 637 (ALT 250 FOR IP): Performed by: STUDENT IN AN ORGANIZED HEALTH CARE EDUCATION/TRAINING PROGRAM

## 2020-07-12 PROCEDURE — 99291 CRITICAL CARE FIRST HOUR: CPT

## 2020-07-12 PROCEDURE — 80053 COMPREHEN METABOLIC PANEL: CPT

## 2020-07-12 PROCEDURE — 2580000003 HC RX 258: Performed by: INTERNAL MEDICINE

## 2020-07-12 PROCEDURE — 83690 ASSAY OF LIPASE: CPT

## 2020-07-12 PROCEDURE — 93010 ELECTROCARDIOGRAM REPORT: CPT | Performed by: INTERNAL MEDICINE

## 2020-07-12 PROCEDURE — 6360000002 HC RX W HCPCS: Performed by: INTERNAL MEDICINE

## 2020-07-12 PROCEDURE — 82962 GLUCOSE BLOOD TEST: CPT

## 2020-07-12 PROCEDURE — 6360000002 HC RX W HCPCS: Performed by: STUDENT IN AN ORGANIZED HEALTH CARE EDUCATION/TRAINING PROGRAM

## 2020-07-12 RX ORDER — METOCLOPRAMIDE HYDROCHLORIDE 5 MG/ML
10 INJECTION INTRAMUSCULAR; INTRAVENOUS ONCE
Status: DISCONTINUED | OUTPATIENT
Start: 2020-07-12 | End: 2020-07-12

## 2020-07-12 RX ORDER — DEXTROSE AND SODIUM CHLORIDE 5; .45 G/100ML; G/100ML
INJECTION, SOLUTION INTRAVENOUS CONTINUOUS PRN
Status: DISCONTINUED | OUTPATIENT
Start: 2020-07-12 | End: 2020-07-12

## 2020-07-12 RX ORDER — DEXTROSE MONOHYDRATE 25 G/50ML
12.5 INJECTION, SOLUTION INTRAVENOUS PRN
Status: DISCONTINUED | OUTPATIENT
Start: 2020-07-12 | End: 2020-07-15 | Stop reason: HOSPADM

## 2020-07-12 RX ORDER — SODIUM CHLORIDE 9 MG/ML
INJECTION, SOLUTION INTRAVENOUS CONTINUOUS
Status: DISCONTINUED | OUTPATIENT
Start: 2020-07-12 | End: 2020-07-12

## 2020-07-12 RX ORDER — MORPHINE SULFATE 2 MG/ML
2 INJECTION, SOLUTION INTRAMUSCULAR; INTRAVENOUS ONCE
Status: COMPLETED | OUTPATIENT
Start: 2020-07-12 | End: 2020-07-12

## 2020-07-12 RX ORDER — NICOTINE POLACRILEX 4 MG
15 LOZENGE BUCCAL PRN
Status: DISCONTINUED | OUTPATIENT
Start: 2020-07-12 | End: 2020-07-15 | Stop reason: HOSPADM

## 2020-07-12 RX ORDER — POTASSIUM CHLORIDE 7.45 MG/ML
10 INJECTION INTRAVENOUS PRN
Status: DISCONTINUED | OUTPATIENT
Start: 2020-07-12 | End: 2020-07-12

## 2020-07-12 RX ORDER — DIPHENHYDRAMINE HYDROCHLORIDE 50 MG/ML
25 INJECTION INTRAMUSCULAR; INTRAVENOUS ONCE
Status: DISCONTINUED | OUTPATIENT
Start: 2020-07-12 | End: 2020-07-12

## 2020-07-12 RX ORDER — POTASSIUM CHLORIDE 29.8 MG/ML
20 INJECTION INTRAVENOUS PRN
Status: DISCONTINUED | OUTPATIENT
Start: 2020-07-12 | End: 2020-07-13

## 2020-07-12 RX ORDER — MORPHINE SULFATE 2 MG/ML
2 INJECTION, SOLUTION INTRAMUSCULAR; INTRAVENOUS EVERY 4 HOURS PRN
Status: DISCONTINUED | OUTPATIENT
Start: 2020-07-12 | End: 2020-07-14 | Stop reason: ALTCHOICE

## 2020-07-12 RX ORDER — MAGNESIUM SULFATE 1 G/100ML
1 INJECTION INTRAVENOUS PRN
Status: DISCONTINUED | OUTPATIENT
Start: 2020-07-12 | End: 2020-07-13

## 2020-07-12 RX ORDER — PROMETHAZINE HYDROCHLORIDE 25 MG/ML
12.5 INJECTION, SOLUTION INTRAMUSCULAR; INTRAVENOUS ONCE
Status: DISCONTINUED | OUTPATIENT
Start: 2020-07-12 | End: 2020-07-12

## 2020-07-12 RX ORDER — 0.9 % SODIUM CHLORIDE 0.9 %
1000 INTRAVENOUS SOLUTION INTRAVENOUS ONCE
Status: COMPLETED | OUTPATIENT
Start: 2020-07-12 | End: 2020-07-12

## 2020-07-12 RX ORDER — METOCLOPRAMIDE HYDROCHLORIDE 5 MG/ML
10 INJECTION INTRAMUSCULAR; INTRAVENOUS ONCE
Status: COMPLETED | OUTPATIENT
Start: 2020-07-12 | End: 2020-07-12

## 2020-07-12 RX ORDER — DEXTROSE MONOHYDRATE 50 MG/ML
100 INJECTION, SOLUTION INTRAVENOUS PRN
Status: DISCONTINUED | OUTPATIENT
Start: 2020-07-12 | End: 2020-07-15 | Stop reason: HOSPADM

## 2020-07-12 RX ORDER — 0.9 % SODIUM CHLORIDE 0.9 %
15 INTRAVENOUS SOLUTION INTRAVENOUS ONCE
Status: DISCONTINUED | OUTPATIENT
Start: 2020-07-12 | End: 2020-07-12

## 2020-07-12 RX ADMIN — SODIUM CHLORIDE 1000 ML: 9 INJECTION, SOLUTION INTRAVENOUS at 12:44

## 2020-07-12 RX ADMIN — SODIUM BICARBONATE: 84 INJECTION, SOLUTION INTRAVENOUS at 22:50

## 2020-07-12 RX ADMIN — SODIUM CHLORIDE 620 ML: 9 INJECTION, SOLUTION INTRAVENOUS at 16:52

## 2020-07-12 RX ADMIN — METOCLOPRAMIDE 10 MG: 5 INJECTION, SOLUTION INTRAMUSCULAR; INTRAVENOUS at 12:50

## 2020-07-12 RX ADMIN — SODIUM BICARBONATE: 84 INJECTION, SOLUTION INTRAVENOUS at 15:58

## 2020-07-12 RX ADMIN — MORPHINE SULFATE 2 MG: 2 INJECTION, SOLUTION INTRAMUSCULAR; INTRAVENOUS at 18:30

## 2020-07-12 RX ADMIN — SODIUM CHLORIDE 0.1 UNITS/KG/HR: 9 INJECTION, SOLUTION INTRAVENOUS at 14:39

## 2020-07-12 RX ADMIN — MORPHINE SULFATE 2 MG: 2 INJECTION, SOLUTION INTRAMUSCULAR; INTRAVENOUS at 13:40

## 2020-07-12 RX ADMIN — METRONIDAZOLE 500 MG: 500 INJECTION, SOLUTION INTRAVENOUS at 15:44

## 2020-07-12 ASSESSMENT — PAIN DESCRIPTION - DESCRIPTORS
DESCRIPTORS: SHARP;ACHING
DESCRIPTORS: SHARP

## 2020-07-12 ASSESSMENT — PAIN DESCRIPTION - PAIN TYPE
TYPE: CHRONIC PAIN

## 2020-07-12 ASSESSMENT — PAIN DESCRIPTION - FREQUENCY
FREQUENCY: CONTINUOUS
FREQUENCY: CONTINUOUS

## 2020-07-12 ASSESSMENT — PAIN DESCRIPTION - ONSET
ONSET: ON-GOING
ONSET: ON-GOING

## 2020-07-12 ASSESSMENT — ENCOUNTER SYMPTOMS
APNEA: 0
RHINORRHEA: 0
TROUBLE SWALLOWING: 0
SHORTNESS OF BREATH: 0
DIARRHEA: 1
BACK PAIN: 0
VOMITING: 0
CHEST TIGHTNESS: 0
WHEEZING: 0
EYE PAIN: 0
NAUSEA: 1
PHOTOPHOBIA: 0
ABDOMINAL PAIN: 0
CONSTIPATION: 0
COUGH: 0
SORE THROAT: 0

## 2020-07-12 ASSESSMENT — PAIN SCALES - GENERAL
PAINLEVEL_OUTOF10: 9
PAINLEVEL_OUTOF10: 10
PAINLEVEL_OUTOF10: 0
PAINLEVEL_OUTOF10: 10

## 2020-07-12 ASSESSMENT — PAIN DESCRIPTION - LOCATION
LOCATION: BACK
LOCATION: BACK

## 2020-07-12 ASSESSMENT — PAIN DESCRIPTION - ORIENTATION
ORIENTATION: LOWER
ORIENTATION: LOWER

## 2020-07-12 NOTE — H&P
[facility] History and Physical    Admission Date  7/12/2020 10:55 AM  Chief Complaint Hyperglycemia  Admit Dx   DKA, type 1, not at goal ([facility] [E10.10]  DKA, type 1, not at goal [facility]) [E10.10]    Subjective  History of Present Illness  Joan Ruiz is a 24F w PMH T1DM on insulin pump inserted recently who began feeling like she was slipping back into DKA yesterday and became nauseous w emesis. Found to be in significant DKA in ED and admitted to ICU. Pt had been seen by Dr. Jerrica Juarez w endocrinology (virtual visit) on 7/1 after being [address] 6/30 for an episode of DKA. She was started on insulin pump that day w instruction to monitor BGs ACHS and return in one week (7/8). She unfortunately failed to follow up. She is unsure of her settings but reports checking BGs as instructed but noticed they were going up last evening. She says the pump did not run out of insulin, her diet did not drastically change, and she has not been sick recently. Review of Systems - 12-point review of systems has been reviewed and is otherwise negative except as listed in the HPI    Past Medical History:   Diagnosis Date    Bleeding at insertion site 1/5/2018    Common femoral artery injury, right, initial encounter 1/5/2018    DM type 1 (diabetes mellitus, type 1) ([facility]      Past Surgical History:   Procedure Laterality Date    ABDOMEN SURGERY N/A 5/9/2019    INCISION AND DRAINAGE OF SUPRAPUBIC ABSESS performed by Catracho Trinidad MD at [address]      last yr     Prior to Admission medications    Medication Sig Start Date End Date Taking?  Authorizing Provider   insulin lispro (HUMALOG) 100 UNIT/ML injection vial 100 units/day via insulin pump 7/2/20   Consuelo Manuel MD   Insulin Pen Needle (BD PEN NEEDLE SHELBIE U/F) 32G X 4 MM MISC USE AS DIRECTED UP TO 6 TIMES A DAY 5/7/20   MIKE Thayer - CNS   FREESTYLE LITE strip Checks BS four times a day before meals and at bedtime and also as needed for high and low blood sugar 5/7/20   Laure Grade, APRN - CNS   acetone, urine, test strip Use daily as directed if bs >250 x2 or illness 5/7/20   Laure Grade, APRN - CNS   insulin glargine Claudetta Biju) 100 UNIT/ML injection pen Inject 28 Units into the skin nightly 5/1/20   Deven Ag MD     Allergies  Patient has no known allergies. Social History   reports that she has never smoked. She has never used smokeless tobacco. She reports that she does not drink alcohol or use drugs. Family History  family history includes Diabetes in her maternal grandmother and paternal grandmother; Stroke in an other family member. Objective  Physical Exam   General: well-developed, well-nourished, no acute distress, cooperative  Skin: warm, dry, intact, normal color without cyanosis  HEENT: normocephalic, atraumatic, mucous membranes normal  Respiratory: clear to auscultation bilaterally without respiratory distress  Cardiovascular: regular rate and rhythm without murmur / rub / gallop  Abdominal: soft, nontender, nondistended, normoactive bowel sounds  Extremities: no mottling, pulses intact, no edema  Neurologic: awake, alert, no focal deficits  Psychiatric: normal affect, cooperative    Labs  Recent Labs     07/12/20  1200      K 5.4*   CO2 6*   BUN 21*   CREATININE 0.7   GLUCOSE 589*   CALCIUM 10.5*   WBC 17.9*   RBC 4.68   HGB 13.3   HCT 41.8        Radiology  XR CHEST PORTABLE   Final Result   Interval insertion of left internal jugular central venous catheter,   as described. No sign of acute airspace disease. XR CHEST PORTABLE   Final Result   No sign of acute pulmonary airspace disease. EKG: sinus tach    Assessment / Plan  #1  DKA: IVF / insulin / BMP monitoring per protocol. ICU admission. Pt tachycardic at 126, CO2 6, gap 35, K 5.4, Na 135, lactic 4.5,  initially. Likely reactive leukocytosis. PH 7.04.   Urine w >1000 glucose and >80 ketones.       #2  Trichomoniasis:  Noted on UA; start Flagyl 500 mg IV bid for now, total of 7 days, switch to PO once able to tolerate PO    Code status  Full  DVT prophylaxis Lovenox  Disposition  Home once medically stable    Electronically signed by Smooth Cosme, DO on 7/12/2020 at 4:32 PM

## 2020-07-12 NOTE — CONSULTS
Critical Care Admit/Consult Note         Patient - Sheila Castrejon   MRN -  [id number]   [facility] # - [de-identified]   - 1995      Date of Admission -  2020 10:55 AM  Date of evaluation -  2020   Hospital Day - 0            ADMIT/CONSULT DETAILS     Reason for Admit/Consult   DKA      Consulting 13[address] Lavonne Wheeler DO  Primary Care Physician - DO TRINI Ferro   The patient is a 27 y.o. female with significant past medical history of DM with multiple admission for dka last admission was < 1 month ago. She presented with nausea and vomiting with abdominal pain. She was found to be in dka started on insulin gtt. Urine (+) t. Vagnialis          Past Medical History         Diagnosis Date    Bleeding at insertion site 2018    Common femoral artery injury, right, initial encounter 2018    DM type 1 (diabetes mellitus, type 1) ([facility] [address]         Past Surgical History           Procedure Laterality Date    ABDOMEN SURGERY N/A 2019    INCISION AND DRAINAGE OF SUPRAPUBIC ABSESS performed by Alexandra Germain MD at [facility]      last yr           Current Medications   Current Medications    metroNIDAZOLE  500 mg Intravenous Q12H     glucose, dextrose, glucagon (rDNA), dextrose  IV Drips/Infusions   dextrose      insulin       Home Medications  Not in a hospital admission. Diet/Nutrition   No diet orders on file    Allergies   Patient has no known allergies. Social History   Tobacco   reports that she has never smoked. She has never used smokeless tobacco.    Alcohol     reports no history of alcohol use.     Occupational history :    Family History         Problem Relation Age of Onset    Diabetes Maternal Grandmother     Diabetes Paternal Grandmother     Stroke Other     Thyroid Disease Neg Hx            ROS     REVIEW OF SYSTEMS:  EYES:  negative  HEENT:  negative  RESPIRATORY:  negative  CARDIOVASCULAR: negative  GASTROINTESTINAL:  positive for nausea, vomiting and abdominal pain  GENITOURINARY:  positive for dysuria  INTEGUMENT/BREAST:  negative  HEMATOLOGIC/LYMPHATIC:  negative  ALLERGIC/IMMUNOLOGIC:  negative  ENDOCRINE:  negative  MUSCULOSKELETAL:  negative  NEUROLOGICAL:  negative  BEHAVIOR/PSYCH:  negative    Lines and Devices    iv     Mechanical Ventilation Data   VENT SETTINGS (Comprehensive)  Vent Information  SpO2: 98 %  Additional Respiratory  Assessments  Pulse: 126  SpO2: 98 %    ABG  Lab Results   Component Value Date    PH 7.395 2020    PCO2 37.7 2020    PO2 50.7 2020    HCO3 22.6 2020    O2SAT 82.6 2020     Lab Results   Component Value Date    MODE RA 2020           Vitals    weight is 91 lb (41.3 kg). Her blood pressure is 116/52 (abnormal) and her pulse is 126. Her oxygen saturation is 98%. Temperature Range:   No data recorded  BP Range:  Systolic (38OHC), ZGO:016 , Min:116 , QVH:346     Diastolic (69AAA), JCN:78, Min:52, Max:64    Pulse Range: Pulse  Av  Min: 126  Max: 126  Respiration Range: No data recorded  Current Pulse Ox[de-identified]  SpO2: 98 %  24HR Pulse Ox Range:  SpO2  Av.5 %  Min: 97 %  Max: 98 %  Oxygen Amount and Delivery:        I/O (24 Hours)    Patient Vitals for the past 8 hrs:   BP Pulse SpO2 Weight   20 1330 (!) 116/52 126 98 % --   20 1315 118/64 126 97 % --   20 1254 -- -- -- 91 lb (41.3 kg)     No intake or output data in the 24 hours ending 20 1432  No intake/output data recorded.      Patient Vitals for the past 96 hrs (Last 3 readings):   Weight   20 1254 91 lb (41.3 kg)         Drains/Tubes Outputs    Exam         PHYSICAL EXAM:  CONSTITUTIONAL:  awake, alert, cooperative, no apparent distress, and appears stated age  EYES:  Lids and lashes normal, pupils equal, round and reactive to light, extra ocular muscles intact, sclera clear, conjunctiva normal  ENT:  Normocephalic, without obvious abnormality, atraumatic, sinuses nontender on palpation, external ears without lesions, oral pharynx with moist mucus membranes, tonsils without erythema or exudates, gums normal and good dentition. LUNGS:  No increased work of breathing, good air exchange, clear to auscultation bilaterally, no crackles or wheezing  CARDIOVASCULAR:  Normal apical impulse, regular rate and rhythm, normal S1 and S2, no S3 or S4, and no murmur noted  ABDOMEN:  No scars, normal bowel sounds, soft, non-distended, non-tender, no masses palpated, no hepatosplenomegally  MUSCULOSKELETAL:  there is no redness, warmth, or swelling of the joints  NEUROLOGIC:  Cranial Nerves:  cranial nerves II-XII are grossly intact  SKIN:  no bruising or bleeding    Data   Old records and images have been reviewed    Lab Results   CBC     Lab Results   Component Value Date    WBC 17.9 07/12/2020    RBC 4.68 07/12/2020    HGB 13.3 07/12/2020    HCT 41.8 07/12/2020     07/12/2020    MCV 89.3 07/12/2020    MCH 28.4 07/12/2020    MCHC 31.8 07/12/2020    RDW 12.2 07/12/2020    NRBC 0.0 05/10/2019    SEGSPCT 58 09/29/2013    METASPCT 1.0 04/29/2020    LYMPHOPCT 12.4 07/12/2020    MONOPCT 5.5 07/12/2020    MYELOPCT 1.0 04/29/2020    BASOPCT 0.2 07/12/2020    MONOSABS 0.98 07/12/2020    LYMPHSABS 2.22 07/12/2020    EOSABS 0.00 07/12/2020    BASOSABS 0.04 07/12/2020       BMP   Lab Results   Component Value Date     07/12/2020    K 5.4 07/12/2020    CL 94 07/12/2020    CO2 6 07/12/2020    BUN 21 07/12/2020    CREATININE 0.7 07/12/2020    GLUCOSE 589 07/12/2020    CALCIUM 10.5 07/12/2020       LFTS  Lab Results   Component Value Date    ALKPHOS 76 07/12/2020    ALT 22 07/12/2020    AST 23 07/12/2020    PROT 8.4 07/12/2020    BILITOT 0.5 07/12/2020    BILIDIR <0.2 10/28/2019    IBILI see below 10/28/2019    LABALBU 4.7 07/12/2020       INR  No results for input(s): PROTIME, INR in the last 72 hours. APTT  No results for input(s):  APTT in the last 72 hours. Lactic Acid  Lab Results   Component Value Date    LACTA 4.5 07/12/2020    LACTA 1.6 06/29/2020    LACTA 1.5 05/30/2020        BNP   No results for input(s): BNP in the last 72 hours. Cultures     No results for input(s): BC in the last 72 hours. No results for input(s): Katiuska Hint in the last 72 hours. No results for input(s): LABURIN in the last 72 hours. Radiology     Cxr reveiwed       Impression:     1. DKA - severe acidosis   2. Leukocytosis   3. Hyperkalemia   4. (+)  trichamonas       Plan:   DKA per protocol   Start bicarb gtt   Start flagy 500 mg bid x 7 days   Trend labs       Ivette Cherry M.D.    Pulmonary/Critical Care Medicine   36 min cct excluding procedures

## 2020-07-12 NOTE — FLOWSHEET NOTE
Asked RN in ER about redraw of blood sugar,he stated it still reads RRHI, stated I should speak to his attending regarding redraw being sent to the lab. Spoke with ER attending he stated the blood sugar would be redrawn.

## 2020-07-12 NOTE — ED NOTES
Bed: 11  Expected date:   Expected time:   Means of arrival:   Comments:  dennis Cuellar RN  07/12/20 2804

## 2020-07-13 LAB
AMPHETAMINE SCREEN, URINE: NOT DETECTED
ANION GAP SERPL CALCULATED.3IONS-SCNC: 10 MMOL/L (ref 7–16)
ANION GAP SERPL CALCULATED.3IONS-SCNC: 15 MMOL/L (ref 7–16)
ANION GAP SERPL CALCULATED.3IONS-SCNC: 9 MMOL/L (ref 7–16)
BARBITURATE SCREEN URINE: NOT DETECTED
BENZODIAZEPINE SCREEN, URINE: NOT DETECTED
BUN BLDV-MCNC: 12 MG/DL (ref 6–20)
BUN BLDV-MCNC: 15 MG/DL (ref 6–20)
BUN BLDV-MCNC: 17 MG/DL (ref 6–20)
CALCIUM SERPL-MCNC: 8.4 MG/DL (ref 8.6–10.2)
CALCIUM SERPL-MCNC: 8.9 MG/DL (ref 8.6–10.2)
CALCIUM SERPL-MCNC: 9.3 MG/DL (ref 8.6–10.2)
CANNABINOID SCREEN URINE: NOT DETECTED
CHLORIDE BLD-SCNC: 104 MMOL/L (ref 98–107)
CHLORIDE BLD-SCNC: 104 MMOL/L (ref 98–107)
CHLORIDE BLD-SCNC: 99 MMOL/L (ref 98–107)
CO2: 22 MMOL/L (ref 22–29)
CO2: 27 MMOL/L (ref 22–29)
CO2: 31 MMOL/L (ref 22–29)
COCAINE METABOLITE SCREEN URINE: NOT DETECTED
CREAT SERPL-MCNC: 0.5 MG/DL (ref 0.5–1)
CREAT SERPL-MCNC: 0.6 MG/DL (ref 0.5–1)
CREAT SERPL-MCNC: 0.7 MG/DL (ref 0.5–1)
FENTANYL SCREEN, URINE: NOT DETECTED
GFR AFRICAN AMERICAN: >60
GFR NON-AFRICAN AMERICAN: >60 ML/MIN/1.73
GLUCOSE BLD-MCNC: 184 MG/DL (ref 74–99)
GLUCOSE BLD-MCNC: 189 MG/DL (ref 74–99)
GLUCOSE BLD-MCNC: 260 MG/DL (ref 74–99)
LACTIC ACID: 1.5 MMOL/L (ref 0.5–2.2)
LACTIC ACID: 2 MMOL/L (ref 0.5–2.2)
Lab: NORMAL
MAGNESIUM: 1.6 MG/DL (ref 1.6–2.6)
MAGNESIUM: 2.1 MG/DL (ref 1.6–2.6)
MAGNESIUM: 2.4 MG/DL (ref 1.6–2.6)
METER GLUCOSE: 109 MG/DL (ref 74–99)
METER GLUCOSE: 111 MG/DL (ref 74–99)
METER GLUCOSE: 134 MG/DL (ref 74–99)
METER GLUCOSE: 159 MG/DL (ref 74–99)
METER GLUCOSE: 159 MG/DL (ref 74–99)
METER GLUCOSE: 175 MG/DL (ref 74–99)
METER GLUCOSE: 181 MG/DL (ref 74–99)
METER GLUCOSE: 185 MG/DL (ref 74–99)
METER GLUCOSE: 191 MG/DL (ref 74–99)
METER GLUCOSE: 196 MG/DL (ref 74–99)
METER GLUCOSE: 203 MG/DL (ref 74–99)
METER GLUCOSE: 257 MG/DL (ref 74–99)
METER GLUCOSE: 66 MG/DL (ref 74–99)
METER GLUCOSE: 68 MG/DL (ref 74–99)
METER GLUCOSE: 77 MG/DL (ref 74–99)
METER GLUCOSE: 80 MG/DL (ref 74–99)
METHADONE SCREEN, URINE: NOT DETECTED
OPIATE SCREEN URINE: NOT DETECTED
OXYCODONE URINE: NOT DETECTED
PHENCYCLIDINE SCREEN URINE: NOT DETECTED
PHOSPHORUS: 1.1 MG/DL (ref 2.5–4.5)
PHOSPHORUS: 2.1 MG/DL (ref 2.5–4.5)
PHOSPHORUS: 4.4 MG/DL (ref 2.5–4.5)
POTASSIUM SERPL-SCNC: 3 MMOL/L (ref 3.5–5)
POTASSIUM SERPL-SCNC: 3.3 MMOL/L (ref 3.5–5)
POTASSIUM SERPL-SCNC: 4.6 MMOL/L (ref 3.5–5)
SODIUM BLD-SCNC: 139 MMOL/L (ref 132–146)
SODIUM BLD-SCNC: 141 MMOL/L (ref 132–146)
SODIUM BLD-SCNC: 141 MMOL/L (ref 132–146)
TROPONIN: <0.01 NG/ML (ref 0–0.03)

## 2020-07-13 PROCEDURE — 6370000000 HC RX 637 (ALT 250 FOR IP): Performed by: INTERNAL MEDICINE

## 2020-07-13 PROCEDURE — 36592 COLLECT BLOOD FROM PICC: CPT

## 2020-07-13 PROCEDURE — 80048 BASIC METABOLIC PNL TOTAL CA: CPT

## 2020-07-13 PROCEDURE — 83605 ASSAY OF LACTIC ACID: CPT

## 2020-07-13 PROCEDURE — 36415 COLL VENOUS BLD VENIPUNCTURE: CPT

## 2020-07-13 PROCEDURE — 2580000003 HC RX 258: Performed by: STUDENT IN AN ORGANIZED HEALTH CARE EDUCATION/TRAINING PROGRAM

## 2020-07-13 PROCEDURE — 99233 SBSQ HOSP IP/OBS HIGH 50: CPT | Performed by: INTERNAL MEDICINE

## 2020-07-13 PROCEDURE — 1200000000 HC SEMI PRIVATE

## 2020-07-13 PROCEDURE — 6360000002 HC RX W HCPCS: Performed by: INTERNAL MEDICINE

## 2020-07-13 PROCEDURE — 2580000003 HC RX 258: Performed by: INTERNAL MEDICINE

## 2020-07-13 PROCEDURE — 82962 GLUCOSE BLOOD TEST: CPT

## 2020-07-13 PROCEDURE — 2500000003 HC RX 250 WO HCPCS: Performed by: INTERNAL MEDICINE

## 2020-07-13 PROCEDURE — 2500000003 HC RX 250 WO HCPCS: Performed by: STUDENT IN AN ORGANIZED HEALTH CARE EDUCATION/TRAINING PROGRAM

## 2020-07-13 PROCEDURE — 87040 BLOOD CULTURE FOR BACTERIA: CPT

## 2020-07-13 PROCEDURE — 84484 ASSAY OF TROPONIN QUANT: CPT

## 2020-07-13 PROCEDURE — 83735 ASSAY OF MAGNESIUM: CPT

## 2020-07-13 PROCEDURE — 84100 ASSAY OF PHOSPHORUS: CPT

## 2020-07-13 RX ORDER — ACETAMINOPHEN 325 MG/1
650 TABLET ORAL EVERY 4 HOURS PRN
Status: DISCONTINUED | OUTPATIENT
Start: 2020-07-13 | End: 2020-07-15 | Stop reason: HOSPADM

## 2020-07-13 RX ORDER — ACETAMINOPHEN 325 MG/1
TABLET ORAL
Status: DISPENSED
Start: 2020-07-13 | End: 2020-07-13

## 2020-07-13 RX ORDER — FAMOTIDINE 20 MG/1
20 TABLET, FILM COATED ORAL 2 TIMES DAILY
Status: DISCONTINUED | OUTPATIENT
Start: 2020-07-13 | End: 2020-07-15 | Stop reason: HOSPADM

## 2020-07-13 RX ORDER — INSULIN GLARGINE 100 [IU]/ML
20 INJECTION, SOLUTION SUBCUTANEOUS ONCE
Status: COMPLETED | OUTPATIENT
Start: 2020-07-13 | End: 2020-07-13

## 2020-07-13 RX ORDER — INSULIN GLARGINE 100 [IU]/ML
20 INJECTION, SOLUTION SUBCUTANEOUS NIGHTLY
Status: DISCONTINUED | OUTPATIENT
Start: 2020-07-13 | End: 2020-07-13

## 2020-07-13 RX ADMIN — MORPHINE SULFATE 2 MG: 2 INJECTION, SOLUTION INTRAMUSCULAR; INTRAVENOUS at 10:10

## 2020-07-13 RX ADMIN — BENZOCAINE AND MENTHOL 1 LOZENGE: 15; 3.6 LOZENGE ORAL at 21:01

## 2020-07-13 RX ADMIN — MORPHINE SULFATE 2 MG: 2 INJECTION, SOLUTION INTRAMUSCULAR; INTRAVENOUS at 14:04

## 2020-07-13 RX ADMIN — POTASSIUM CHLORIDE 20 MEQ: 400 INJECTION, SOLUTION INTRAVENOUS at 02:46

## 2020-07-13 RX ADMIN — POTASSIUM CHLORIDE 20 MEQ: 400 INJECTION, SOLUTION INTRAVENOUS at 09:27

## 2020-07-13 RX ADMIN — SODIUM BICARBONATE: 84 INJECTION, SOLUTION INTRAVENOUS at 05:53

## 2020-07-13 RX ADMIN — METRONIDAZOLE 500 MG: 500 INJECTION, SOLUTION INTRAVENOUS at 02:05

## 2020-07-13 RX ADMIN — MORPHINE SULFATE 2 MG: 2 INJECTION, SOLUTION INTRAMUSCULAR; INTRAVENOUS at 06:01

## 2020-07-13 RX ADMIN — ACETAMINOPHEN 650 MG: 325 TABLET ORAL at 00:12

## 2020-07-13 RX ADMIN — SODIUM CHLORIDE 4.1 UNITS/HR: 9 INJECTION, SOLUTION INTRAVENOUS at 01:44

## 2020-07-13 RX ADMIN — INSULIN GLARGINE 20 UNITS: 100 INJECTION, SOLUTION SUBCUTANEOUS at 10:30

## 2020-07-13 RX ADMIN — MAGNESIUM SULFATE HEPTAHYDRATE 1 G: 1 INJECTION, SOLUTION INTRAVENOUS at 00:52

## 2020-07-13 RX ADMIN — MAGNESIUM SULFATE HEPTAHYDRATE 1 G: 1 INJECTION, SOLUTION INTRAVENOUS at 01:44

## 2020-07-13 RX ADMIN — SODIUM PHOSPHATE, MONOBASIC, MONOHYDRATE 20 MMOL: 276; 142 INJECTION, SOLUTION INTRAVENOUS at 02:14

## 2020-07-13 RX ADMIN — METRONIDAZOLE 500 MG: 500 INJECTION, SOLUTION INTRAVENOUS at 13:51

## 2020-07-13 RX ADMIN — POTASSIUM CHLORIDE 20 MEQ: 400 INJECTION, SOLUTION INTRAVENOUS at 01:44

## 2020-07-13 RX ADMIN — POTASSIUM CHLORIDE 20 MEQ: 400 INJECTION, SOLUTION INTRAVENOUS at 10:57

## 2020-07-13 RX ADMIN — POTASSIUM CHLORIDE 20 MEQ: 400 INJECTION, SOLUTION INTRAVENOUS at 00:53

## 2020-07-13 RX ADMIN — ACETAMINOPHEN 650 MG: 325 TABLET ORAL at 21:02

## 2020-07-13 RX ADMIN — MORPHINE SULFATE 2 MG: 2 INJECTION, SOLUTION INTRAMUSCULAR; INTRAVENOUS at 00:11

## 2020-07-13 RX ADMIN — MORPHINE SULFATE 2 MG: 2 INJECTION, SOLUTION INTRAMUSCULAR; INTRAVENOUS at 19:23

## 2020-07-13 RX ADMIN — MORPHINE SULFATE 2 MG: 2 INJECTION, SOLUTION INTRAMUSCULAR; INTRAVENOUS at 23:41

## 2020-07-13 ASSESSMENT — PAIN SCALES - GENERAL
PAINLEVEL_OUTOF10: 10
PAINLEVEL_OUTOF10: 6
PAINLEVEL_OUTOF10: 9
PAINLEVEL_OUTOF10: 9
PAINLEVEL_OUTOF10: 0
PAINLEVEL_OUTOF10: 6
PAINLEVEL_OUTOF10: 3
PAINLEVEL_OUTOF10: 0
PAINLEVEL_OUTOF10: 4
PAINLEVEL_OUTOF10: 4
PAINLEVEL_OUTOF10: 6
PAINLEVEL_OUTOF10: 0
PAINLEVEL_OUTOF10: 9
PAINLEVEL_OUTOF10: 5
PAINLEVEL_OUTOF10: 9
PAINLEVEL_OUTOF10: 0

## 2020-07-13 ASSESSMENT — PAIN DESCRIPTION - PAIN TYPE
TYPE: CHRONIC PAIN

## 2020-07-13 ASSESSMENT — PAIN DESCRIPTION - ORIENTATION: ORIENTATION: LOWER

## 2020-07-13 ASSESSMENT — PAIN DESCRIPTION - FREQUENCY: FREQUENCY: CONTINUOUS

## 2020-07-13 ASSESSMENT — PAIN DESCRIPTION - ONSET: ONSET: ON-GOING

## 2020-07-13 ASSESSMENT — PAIN DESCRIPTION - DESCRIPTORS
DESCRIPTORS: DISCOMFORT
DESCRIPTORS: SHOOTING;SORE
DESCRIPTORS: DISCOMFORT

## 2020-07-13 ASSESSMENT — PAIN DESCRIPTION - LOCATION
LOCATION: BACK

## 2020-07-13 NOTE — PATIENT CARE CONFERENCE
Intensive Care Daily Quality Rounding Checklist      ICU Team Members: Bedside Nurse, ,  and Nursing Unit Leadership    ICU Day #: NUMBER: 2    Intubation Date:  NA    Ventilator Day #: NA    Central Line Insertion Date:  07/12/2020        Day #: 2     Arterial Line Insertion Date:  NA      Day #: NA    DVT Prophylaxis: Lovenox    GI Prophylaxis: Needs addressed    Bey Catheter Insertion Date:  NA       Day #: NA      Continued need (if yes, reason documented and discussed with physician): NA    Skin Issues/ Wounds and ordered treatment discussed on rounds: NA    Goals/ Plans for the Day: Monitor labs, replace lytes, close gap, bridge

## 2020-07-13 NOTE — PROGRESS NOTES
[facility]) Hospitalist Progress Note    Admission Date  7/12/2020 10:55 AM  Chief Complaint Hyperglycemia  Admit Dx   DKA, type 1, not at goal [facility]) [E10.10]    Subjective  History of Present Illness  7/12  Ana Silva is a 24F w PMH T1DM on insulin pump inserted recently who began feeling like she was slipping back into DKA yesterday and became nauseous w emesis. Found to be in significant DKA in ED and admitted to ICU. Pt had been seen by Dr. Aliza Lawson w endocrinology (virtual visit) on 7/1 after being [address] 6/30 for an episode of DKA. She was started on insulin pump that day w instruction to monitor BGs ACHS and return in one week (7/8). She unfortunately failed to follow up. She is unsure of her settings but reports checking BGs as instructed but noticed they were going up last evening. She says the pump did not run out of insulin, her diet did not drastically change, and she has not been sick recently. 7/13  Gap closed since midnight. -180. Still tachycardic in the 120s. Patient's symptoms are improved today, still complaining of generalized pain, greatest in the lower back. In no distress, was actually sleeping when I went to the room. Technically not DKA, we are having some issue as the patient has an insulin pump, though pharmacy is working with the medical team in working on an insulin regimen/transition plan. Potentially out of the unit today.     Review of Systems - 12-point review of systems has been reviewed and is otherwise negative except as listed in the HPI    Objective  Physical Exam  Vitals: BP (!) 96/49   Pulse 120   Temp 97.7 °F (36.5 °C) (Tympanic)   Resp 20   Ht 5' 1\" (1.549 m)   Wt 91 lb (41.3 kg)   SpO2 100%   BMI 17.19 kg/m²   General: well-developed, well-nourished, no acute distress, cooperative  Skin: warm, dry, intact, normal color without cyanosis  HEENT: normocephalic, atraumatic, mucous membranes normal  Respiratory: clear to auscultation bilaterally without respiratory distress  Cardiovascular: regular / tachycardic rate and rhythm without murmur / rub / gallop  Abdominal: soft, nontender, nondistended, normoactive bowel sounds  Extremities: no mottling, pulses intact, no edema  Neurologic: awake, alert, no focal deficits  Psychiatric: normal affect, cooperative    Labs  Recent Labs     07/12/20  1200  07/12/20 2000 07/13/20  0000 07/13/20  0400      < > 138 141 141   K 5.4*   < > 4.3 3.0* 4.6   CO2 6*   < > 10* 22 27   BUN 21*   < > 17 17 15   CREATININE 0.7   < > 0.7 0.7 0.6   GLUCOSE 589*   < > 372* 260* 184*   CALCIUM 10.5*   < > 9.3 9.3 8.9   WBC 17.9*  --   --   --   --    RBC 4.68  --   --   --   --    HGB 13.3  --   --   --   --    HCT 41.8  --   --   --   --      --   --   --   --     < > = values in this interval not displayed. Radiology  Xr Chest Portable  Result Date: 7/12/2020  Interval insertion of left internal jugular central venous catheter, as described. No sign of acute airspace disease. Xr Chest Portable  Result Date: 7/12/2020  No sign of acute pulmonary airspace disease. Assessment / Plan  #1  T1DM with DKA resolved: IVF / insulin / BMP monitoring per protocol. ICU admission. Pt tachycardic at 126, CO2 6, gap 35, K 5.4, Na 135, lactic 4.5,  initially. Likely reactive leukocytosis. PH 7.04. Urine w >1000 glucose and >80 ketones. Now out of DKA since midnight. Pharmacy is helping construct a plan to transition the patient back to her insulin pump.   Probably out of the ICU today     #2  Trichomoniasis:  Noted on UA; start Flagyl 500 mg IV bid for now, total of 7 days, switch to PO once able to tolerate PO     Code status               Full  DVT prophylaxis       Lovenox  Disposition                Home once medically stable    Electronically signed by Rd Juarez, DO on 7/13/2020 at 7:39 AM

## 2020-07-13 NOTE — PROGRESS NOTES
Pulse 120   Temp 97.7 °F (36.5 °C) (Tympanic)   Resp 20   Ht 5' 1\" (1.549 m)   Wt 91 lb (41.3 kg)   SpO2 100%   BMI 17.19 kg/m²   Tmax over 24 hours:  Temp (24hrs), Av.5 °F (37.5 °C), Min:97.7 °F (36.5 °C), Max:101.4 °F (38.6 °C)      Patient Vitals for the past 6 hrs:   BP Temp Temp src Pulse Resp SpO2   20 0400 -- 97.7 °F (36.5 °C) Tympanic -- -- --   20 0300 (!) 96/49 -- -- 120 20 100 %   20 0200 (!) 93/55 -- -- 123 15 100 %   20 0100 (!) 94/46 -- -- 124 10 98 %       No intake or output data in the 24 hours ending 20 0620  Wt Readings from Last 2 Encounters:   20 91 lb (41.3 kg)   20 91 lb 7.9 oz (41.5 kg)     Body mass index is 17.19 kg/m².         PHYSICAL EXAMINATION:    General appearance - alert, well appearing, and in no distress  Mental status - alert, oriented to person, place, and time  Eyes - pupils equal and reactive, extraocular eye movements intact  Ears - bilateral TM's and external ear canals normal  Nose - normal and patent, no erythema, discharge or polyps  Mouth - mucous membranes moist, pharynx normal without lesions  Neck - supple, no significant adenopathy  Chest - clear to auscultation, no wheezes, rales or rhonchi, symmetric air entry  Heart - normal rate, regular rhythm, normal S1, S2, no murmurs, rubs, clicks or gallops  Abdomen - soft, nontender, nondistended, no masses or organomegaly  Neurological - alert, oriented, normal speech, no focal findings or movement disorder noted}  Extremities - peripheral pulses normal, no pedal edema, no clubbing or cyanosis  Skin - normal coloration and turgor, no rashes, no suspicious skin lesions noted      Any additional physical findings:    MEDICATIONS:    Scheduled Meds:   acetaminophen        enoxaparin  40 mg Subcutaneous Daily    metroNIDAZOLE  500 mg Intravenous Q12H     Continuous Infusions:   dextrose      insulin 1.48 Units/hr (20 0557)    sodium bicarbonate infusion 150 mL/hr at 07/13/20 0553     PRN Meds:   acetaminophen, 650 mg, Q4H PRN  glucose, 15 g, PRN  dextrose, 12.5 g, PRN  glucagon (rDNA), 1 mg, PRN  dextrose, 100 mL/hr, PRN  magnesium sulfate, 1 g, PRN  sodium phosphate IVPB, 10 mmol, PRN    Or  sodium phosphate IVPB, 15 mmol, PRN    Or  sodium phosphate IVPB, 20 mmol, PRN  morphine, 2 mg, Q4H PRN  potassium chloride, 20 mEq, PRN          VENT SETTINGS (Comprehensive) (if applicable):  Vent Information  SpO2: 100 %  Additional Respiratory  Assessments  Pulse: 120  Resp: 20  SpO2: 100 %    ABGs:     Laboratory findings:    Complete Blood Count:   Recent Labs     07/12/20  1200   WBC 17.9*   HGB 13.3   HCT 41.8           Last 3 Blood Glucose:   Recent Labs     07/12/20 2000 07/13/20  0000 07/13/20  0400   GLUCOSE 372* 260* 184*        PT/INR:    Lab Results   Component Value Date    PROTIME 11.6 02/10/2019    INR 1.0 02/10/2019     PTT:    Lab Results   Component Value Date    APTT 35.3 02/10/2019       Comprehensive Metabolic Profile:   Recent Labs     07/12/20  1200  07/12/20 2000 07/13/20  0000 07/13/20  0400      < > 138 141 141   K 5.4*   < > 4.3 3.0* 4.6   CL 94*   < > 103 104 104   CO2 6*   < > 10* 22 27   BUN 21*   < > 17 17 15   CREATININE 0.7   < > 0.7 0.7 0.6   GLUCOSE 589*   < > 372* 260* 184*   CALCIUM 10.5*   < > 9.3 9.3 8.9   PROT 8.4*  --   --   --   --    LABALBU 4.7  --   --   --   --    BILITOT 0.5  --   --   --   --    ALKPHOS 76  --   --   --   --    AST 23  --   --   --   --    ALT 22  --   --   --   --     < > = values in this interval not displayed. Magnesium:   Lab Results   Component Value Date    MG 2.4 07/13/2020     Phosphorus:   Lab Results   Component Value Date    PHOS 2.1 07/13/2020     Ionized Calcium: No results found for: CAION     Urinalysis:     Troponin: No results for input(s): TROPONINI in the last 72 hours.     Microbiology:    Cultures during this admission:     Blood cultures:                 [] None drawn      [] Negative             []  Positive (Details:  )  Urine Culture:                   [] None drawn      [] Negative             []  Positive (Details:  )  Sputum Culture:               [] None drawn       [] Negative             []  Positive (Details:  )   Endotracheal aspirate:     [] None drawn       [] Negative             []  Positive (Details:  )     Other pertinent Labs:       Radiology/Imaging:     Chest Xray (7/13/2020):    ASSESSMENT:     Patient Active Problem List    Diagnosis Date Noted    [facility] 07/01/2020    Hypoglycemia 05/22/2020    Vitamin D deficiency 05/22/2020    Diabetic ketoacidosis without coma associated with type 1 diabetes mellitus ([facility] [address] 05/04/2020    Acidosis     Hypokalemia     Pyelonephritis     Hyperglycemia 03/06/2020    Leukocytosis     Elevated lactic acid level     DKA, type 1, not at goal [facility]) 10/28/2019    Diabetes mellitus type 1, uncontrolled ([facility] 11/12/2018    Personal history of noncompliance with medical treatment, presenting hazards to health 11/12/2018       Additional assessment:    SYSTEMS ASSESSMENT    Neuro   Alert and oriented, no acute issue  Avoid sedating agents    Respiratory   No acute issues  Wean oxygen as tolerated.  Keep O2 sat 90-92%    Cardiovascular   No acute issues  EKGs for chest pain or telemetry changes    Gastrointestinal   Was n.p.o. while receiving insulin drip, now carb controlled diet  Zofran for nausea    Renal   No acute renal pathophysiology  Avoid nephrotoxic agents  Replete electrolytes as needed  Potassium 4.6 this morning    Infectious Disease   No infectious pathology at this time        A.m. CBC    Hematology/Oncology   No evidence of anemia or other acute hematologic issue  Transfuse for hemoglobin below 7    Endocrine   DKA, now resolved, anion gap closed x2   transitioned to D5 half-normal from normal saline  Insulin drip stopped  Transitioning to subcutaneous insulin well troubleshooting the patient's pump, failure of which appears to have been the cause of her DKA    Lines/Tubes   Left internal jugular    Diet   Carb controlled    Social/Spiritual/DNR/Other   Full code   Lovenox prophylaxis        PLAN:     WEAN PER PROTOCOL:  [x] No   [] Yes  [] N/A    DISCONTINUE ANY LABS:   [x] No   [] Yes    ICU PROPHYLAXIS:  Stress ulcer:  [] PPI Agent  [] [id number] [] Sucralfate  [] Other:  VTE:   [x] Enoxaparin  [] Unfract. Heparin Subcut  [] EPC Cuffs    NUTRITION:  [] NPO [] Tube Feeding (Specify: ) [] TPN  [] PO (Diet: Diet NPO Effective Now)    HOME MEDICATIONS RECONCILED: [] No  [] Yes    INSULIN DRIP:   [x] No   [] Yes    CONSULTATION NEEDED:  [x] No   [] Yes    FAMILY UPDATED:    [x] No   [] Yes    TRANSFER OUT OF ICU:   [] No   [x] Yes    ADDITIONAL PLAN:    Leonel Jackson  PGY-2  1:46 PM EDT    Attending Physician Attestation: Dr. Lizette Mix    Thank you very much for allowing me to see this patient in consultation and follow up. I personally saw, examined and provided care for the patient. Radiographs, labs and medication list were reviewed by me independently. I spoke with bedside nursing, respiratory therapists and consultants. Critical care services and times documented are independent of procedures and multidisciplinary rounds with Residents. Additionally comprehensive, multidisciplinary rounds were conducted with the MICU team. The case was discussed in detail and plans for care were established. Review of Residents documentation was conducted and revisions were made as appropriate. I agree with the the above documented information.      Physical Examination:  Vitals:   Vitals:    07/13/20 1300 07/13/20 1400 07/13/20 1420 07/13/20 1600   BP: 125/89 113/73 131/76 118/76   Pulse: 117 121 121 123   Resp: 21 12 13 16   Temp:    97.8 °F (36.6 °C)   TempSrc:    Oral   SpO2: 100% 99% 98% 98%   Weight:       Height:          General: No Acute Distress  HEENT: normocephalic, atruamatic  CVS: RRR, S1, S2, No S3 or S4  Respiratory: decreased breath sounds at lung bases, no focal wheezes noted  Extremities: no clubbing/cyanosis/edema    ASSESSMENT:  1.) DKA  2.) Trichomonas vaginalis    In addition the following applies:    Check: blood culture x 2, troponin, EKG  Medication Alterations: lantus 20 units with ISS, insulin pump management to be deferred at this time  Procedures: N/A  Imaging: reviewed  New Consultations: N/A  VENT: N/A    Access: Peripheral   Consults: N/A  Drips: N/A  Nutrition: PO  ABX: Flagyl (trichomonas)    Thank you for allowing me to participate in the care of this patient. Care reviewed with nursing staff, medical and surgical specialty care, primary care and the patient's family as available. Restraints are ordered when the patient can do harm to him/herself by pulling out devices. Critical Care Time: > 35 minutes excluding procedures    RICARDO Shaw  7/13/2020  4:28 PM

## 2020-07-13 NOTE — CARE COORDINATION
Met w/ patient. Explained role of  and plan of care. Lives w/ grandmother. Independent PTA. Endocrinologist is Dr. Elisabet Orozco. PCP is Dr. Pablo Bennett. Has insulin pump. States she has a glucometer and all other diabetic testing supplies. Declining diabetic education classes. Per pt, plan is to return home on discharge- states no needs.   Will follow Annabel Ponce

## 2020-07-14 LAB
METER GLUCOSE: 141 MG/DL (ref 74–99)
METER GLUCOSE: 141 MG/DL (ref 74–99)
METER GLUCOSE: 152 MG/DL (ref 74–99)
METER GLUCOSE: 168 MG/DL (ref 74–99)
METER GLUCOSE: 175 MG/DL (ref 74–99)
METER GLUCOSE: 201 MG/DL (ref 74–99)
METER GLUCOSE: 99 MG/DL (ref 74–99)
MRSA CULTURE ONLY: NORMAL

## 2020-07-14 PROCEDURE — 82962 GLUCOSE BLOOD TEST: CPT

## 2020-07-14 PROCEDURE — 6370000000 HC RX 637 (ALT 250 FOR IP): Performed by: INTERNAL MEDICINE

## 2020-07-14 PROCEDURE — 2500000003 HC RX 250 WO HCPCS: Performed by: INTERNAL MEDICINE

## 2020-07-14 PROCEDURE — 99233 SBSQ HOSP IP/OBS HIGH 50: CPT | Performed by: INTERNAL MEDICINE

## 2020-07-14 PROCEDURE — 6360000002 HC RX W HCPCS: Performed by: INTERNAL MEDICINE

## 2020-07-14 PROCEDURE — 1200000000 HC SEMI PRIVATE

## 2020-07-14 RX ORDER — METRONIDAZOLE 500 MG/1
500 TABLET ORAL EVERY 8 HOURS SCHEDULED
Status: DISCONTINUED | OUTPATIENT
Start: 2020-07-14 | End: 2020-07-15 | Stop reason: HOSPADM

## 2020-07-14 RX ORDER — OXYCODONE HYDROCHLORIDE AND ACETAMINOPHEN 5; 325 MG/1; MG/1
1 TABLET ORAL EVERY 4 HOURS PRN
Status: DISCONTINUED | OUTPATIENT
Start: 2020-07-14 | End: 2020-07-15 | Stop reason: HOSPADM

## 2020-07-14 RX ADMIN — BENZOCAINE AND MENTHOL 1 LOZENGE: 15; 3.6 LOZENGE ORAL at 01:00

## 2020-07-14 RX ADMIN — BENZOCAINE AND MENTHOL 1 LOZENGE: 15; 3.6 LOZENGE ORAL at 20:04

## 2020-07-14 RX ADMIN — BENZOCAINE AND MENTHOL 1 LOZENGE: 15; 3.6 LOZENGE ORAL at 07:23

## 2020-07-14 RX ADMIN — OXYCODONE HYDROCHLORIDE AND ACETAMINOPHEN 1 TABLET: 5; 325 TABLET ORAL at 20:23

## 2020-07-14 RX ADMIN — MORPHINE SULFATE 2 MG: 2 INJECTION, SOLUTION INTRAMUSCULAR; INTRAVENOUS at 16:01

## 2020-07-14 RX ADMIN — INSULIN LISPRO 4 UNITS: 100 INJECTION, SOLUTION INTRAVENOUS; SUBCUTANEOUS at 17:01

## 2020-07-14 RX ADMIN — INSULIN LISPRO 2 UNITS: 100 INJECTION, SOLUTION INTRAVENOUS; SUBCUTANEOUS at 08:44

## 2020-07-14 RX ADMIN — BENZOCAINE AND MENTHOL 1 LOZENGE: 15; 3.6 LOZENGE ORAL at 17:02

## 2020-07-14 RX ADMIN — METRONIDAZOLE 500 MG: 500 TABLET ORAL at 23:03

## 2020-07-14 RX ADMIN — METRONIDAZOLE 500 MG: 500 INJECTION, SOLUTION INTRAVENOUS at 01:47

## 2020-07-14 RX ADMIN — ACETAMINOPHEN 650 MG: 325 TABLET ORAL at 08:27

## 2020-07-14 RX ADMIN — METRONIDAZOLE 500 MG: 500 INJECTION, SOLUTION INTRAVENOUS at 14:17

## 2020-07-14 RX ADMIN — INSULIN LISPRO 2 UNITS: 100 INJECTION, SOLUTION INTRAVENOUS; SUBCUTANEOUS at 11:51

## 2020-07-14 ASSESSMENT — PAIN DESCRIPTION - ONSET
ONSET: GRADUAL
ONSET: ON-GOING
ONSET: GRADUAL

## 2020-07-14 ASSESSMENT — PAIN SCALES - GENERAL
PAINLEVEL_OUTOF10: 0
PAINLEVEL_OUTOF10: 8
PAINLEVEL_OUTOF10: 0
PAINLEVEL_OUTOF10: 3
PAINLEVEL_OUTOF10: 10

## 2020-07-14 ASSESSMENT — PAIN DESCRIPTION - PROGRESSION
CLINICAL_PROGRESSION: GRADUALLY WORSENING
CLINICAL_PROGRESSION: GRADUALLY WORSENING

## 2020-07-14 ASSESSMENT — PAIN DESCRIPTION - DESCRIPTORS
DESCRIPTORS: STABBING
DESCRIPTORS: ACHING;DISCOMFORT;DULL;HEADACHE
DESCRIPTORS: HEADACHE

## 2020-07-14 ASSESSMENT — PAIN DESCRIPTION - LOCATION
LOCATION: HEAD
LOCATION: BACK
LOCATION: BACK;HEAD

## 2020-07-14 ASSESSMENT — PAIN DESCRIPTION - PAIN TYPE
TYPE: ACUTE PAIN

## 2020-07-14 ASSESSMENT — PAIN DESCRIPTION - FREQUENCY
FREQUENCY: INTERMITTENT

## 2020-07-14 ASSESSMENT — PAIN - FUNCTIONAL ASSESSMENT
PAIN_FUNCTIONAL_ASSESSMENT: PREVENTS OR INTERFERES SOME ACTIVE ACTIVITIES AND ADLS
PAIN_FUNCTIONAL_ASSESSMENT: ACTIVITIES ARE NOT PREVENTED
PAIN_FUNCTIONAL_ASSESSMENT: ACTIVITIES ARE NOT PREVENTED

## 2020-07-14 ASSESSMENT — PAIN DESCRIPTION - ORIENTATION
ORIENTATION: LOWER
ORIENTATION: LOWER

## 2020-07-14 NOTE — PROGRESS NOTES
[facility] Quality Flow/Interdisciplinary Rounds Progress Note        Quality Flow Rounds held on July 14, 2020    Disciplines Attending:  Bedside Nurse, ,  and Nursing Unit Leadership    Magaly Oliva was admitted on 7/12/2020 10:55 AM    Anticipated Discharge Date:  Expected Discharge Date: 07/14/20    Disposition:    Gavino Score:  Gavino Scale Score: 21    Readmission Risk              Risk of Unplanned Readmission:        26           Discussed patient goal for the day, patient clinical progression, and barriers to discharge.   The following Goal(s) of the Day/Commitment(s) have been identified:  Discharge [address] [place]  July 14, 2020

## 2020-07-14 NOTE — PROGRESS NOTES
[facility]) Hospitalist Progress Note    Admission Date  7/12/2020 10:55 AM  Chief Complaint Hyperglycemia  Admit Dx   DKA, type 1, not at goal [facility]) [E10.10]    Subjective  History of Present Illness  7/12  Dallas Gaines is a 24F w PMH T1DM on insulin pump inserted recently who began feeling like she was slipping back into DKA yesterday and became nauseous w emesis. Found to be in significant DKA in ED and admitted to ICU. Pt had been seen by Dr. Toan Tran w endocrinology (virtual visit) on 7/1 after being [address] 6/30 for an episode of DKA. She was started on insulin pump that day w instruction to monitor BGs ACHS and return in one week (7/8). She unfortunately failed to follow up. She is unsure of her settings but reports checking BGs as instructed but noticed they were going up last evening. She says the pump did not run out of insulin, her diet did not drastically change, and she has not been sick recently. 7/13  Gap closed since midnight. -180. Still tachycardic in the 120s. Patient's symptoms are improved today, still complaining of generalized pain, greatest in the lower back. In no distress, was actually sleeping when I went to the room. Technically not DKA, we are having some issue as the patient has an insulin pump, though pharmacy is working with the medical team in working on an insulin regimen/transition plan. Potentially out of the unit today. 7/14 Seen at bedside on the seventh floor. Patient notes a sore throat was otherwise feeling at baseline. She does not currently have the equipment to put her pump back in, she needs a site which she says her mother will bring in. Would like to get this started today and monitor her blood sugars prior to discharge so that we know it is well controlled. No other new issues and no family is present.     Review of Systems - 12-point review of systems has been reviewed and is otherwise negative except as listed in the

## 2020-07-14 NOTE — PROGRESS NOTES
Pulmonary/Critical Care to sign off at this time. Please let us know if our services are needed any further.     Nori Berry  7/14/2020  8:28 AM

## 2020-07-14 NOTE — PROGRESS NOTES
Patient's 2000 HS sugar check 68 with complaint of headache. Drank 8oz apple juice, recheck 80. Will continue to monitor.

## 2020-07-15 VITALS
OXYGEN SATURATION: 96 % | WEIGHT: 102 LBS | HEART RATE: 106 BPM | BODY MASS INDEX: 19.26 KG/M2 | TEMPERATURE: 98.6 F | DIASTOLIC BLOOD PRESSURE: 94 MMHG | SYSTOLIC BLOOD PRESSURE: 124 MMHG | HEIGHT: 61 IN | RESPIRATION RATE: 16 BRPM

## 2020-07-15 PROBLEM — E43 SEVERE PROTEIN-CALORIE MALNUTRITION (HCC): Chronic | Status: ACTIVE | Noted: 2020-07-15

## 2020-07-15 PROBLEM — E10.10 DKA, TYPE 1, NOT AT GOAL (HCC): Status: RESOLVED | Noted: 2019-10-28 | Resolved: 2020-07-15

## 2020-07-15 LAB
ANION GAP SERPL CALCULATED.3IONS-SCNC: 9 MMOL/L (ref 7–16)
BUN BLDV-MCNC: 6 MG/DL (ref 6–20)
CALCIUM SERPL-MCNC: 9.1 MG/DL (ref 8.6–10.2)
CHLORIDE BLD-SCNC: 100 MMOL/L (ref 98–107)
CO2: 29 MMOL/L (ref 22–29)
CREAT SERPL-MCNC: 0.5 MG/DL (ref 0.5–1)
GFR AFRICAN AMERICAN: >60
GFR NON-AFRICAN AMERICAN: >60 ML/MIN/1.73
GLUCOSE BLD-MCNC: 147 MG/DL (ref 74–99)
METER GLUCOSE: 140 MG/DL (ref 74–99)
METER GLUCOSE: 282 MG/DL (ref 74–99)
METER GLUCOSE: 89 MG/DL (ref 74–99)
POTASSIUM SERPL-SCNC: 3.4 MMOL/L (ref 3.5–5)
SODIUM BLD-SCNC: 138 MMOL/L (ref 132–146)

## 2020-07-15 PROCEDURE — 6370000000 HC RX 637 (ALT 250 FOR IP): Performed by: INTERNAL MEDICINE

## 2020-07-15 PROCEDURE — 80048 BASIC METABOLIC PNL TOTAL CA: CPT

## 2020-07-15 PROCEDURE — 99239 HOSP IP/OBS DSCHRG MGMT >30: CPT | Performed by: INTERNAL MEDICINE

## 2020-07-15 PROCEDURE — 82962 GLUCOSE BLOOD TEST: CPT

## 2020-07-15 PROCEDURE — 36415 COLL VENOUS BLD VENIPUNCTURE: CPT

## 2020-07-15 RX ORDER — TRAMADOL HYDROCHLORIDE 50 MG/1
50 TABLET ORAL EVERY 6 HOURS PRN
Qty: 12 TABLET | Refills: 0 | Status: SHIPPED | OUTPATIENT
Start: 2020-07-15 | End: 2020-07-18

## 2020-07-15 RX ORDER — METRONIDAZOLE 500 MG/1
500 TABLET ORAL EVERY 8 HOURS SCHEDULED
Qty: 12 TABLET | Refills: 0 | Status: SHIPPED | OUTPATIENT
Start: 2020-07-15 | End: 2020-07-19

## 2020-07-15 RX ADMIN — METRONIDAZOLE 500 MG: 500 TABLET ORAL at 14:32

## 2020-07-15 RX ADMIN — OXYCODONE HYDROCHLORIDE AND ACETAMINOPHEN 1 TABLET: 5; 325 TABLET ORAL at 01:11

## 2020-07-15 RX ADMIN — OXYCODONE HYDROCHLORIDE AND ACETAMINOPHEN 1 TABLET: 5; 325 TABLET ORAL at 06:57

## 2020-07-15 RX ADMIN — INSULIN LISPRO 6 UNITS: 100 INJECTION, SOLUTION INTRAVENOUS; SUBCUTANEOUS at 07:24

## 2020-07-15 RX ADMIN — INSULIN LISPRO 2 UNITS: 100 INJECTION, SOLUTION INTRAVENOUS; SUBCUTANEOUS at 12:17

## 2020-07-15 RX ADMIN — OXYCODONE HYDROCHLORIDE AND ACETAMINOPHEN 1 TABLET: 5; 325 TABLET ORAL at 11:07

## 2020-07-15 RX ADMIN — METRONIDAZOLE 500 MG: 500 TABLET ORAL at 06:56

## 2020-07-15 ASSESSMENT — PAIN DESCRIPTION - PAIN TYPE
TYPE: CHRONIC PAIN
TYPE: ACUTE PAIN
TYPE: CHRONIC PAIN

## 2020-07-15 ASSESSMENT — PAIN DESCRIPTION - ORIENTATION
ORIENTATION: LOWER

## 2020-07-15 ASSESSMENT — PAIN SCALES - GENERAL
PAINLEVEL_OUTOF10: 0
PAINLEVEL_OUTOF10: 7
PAINLEVEL_OUTOF10: 7
PAINLEVEL_OUTOF10: 10
PAINLEVEL_OUTOF10: 6
PAINLEVEL_OUTOF10: 6

## 2020-07-15 ASSESSMENT — PAIN DESCRIPTION - FREQUENCY: FREQUENCY: INTERMITTENT

## 2020-07-15 ASSESSMENT — PAIN DESCRIPTION - DESCRIPTORS
DESCRIPTORS: ACHING;DISCOMFORT;CONSTANT
DESCRIPTORS: ACHING;DISCOMFORT;DULL
DESCRIPTORS: ACHING;DISCOMFORT

## 2020-07-15 ASSESSMENT — PAIN DESCRIPTION - ONSET
ONSET: ON-GOING

## 2020-07-15 ASSESSMENT — PAIN DESCRIPTION - LOCATION
LOCATION: BACK

## 2020-07-15 ASSESSMENT — PAIN DESCRIPTION - PROGRESSION: CLINICAL_PROGRESSION: GRADUALLY WORSENING

## 2020-07-15 ASSESSMENT — PAIN - FUNCTIONAL ASSESSMENT: PAIN_FUNCTIONAL_ASSESSMENT: ACTIVITIES ARE NOT PREVENTED

## 2020-07-15 NOTE — DISCHARGE SUMMARY
consult. Waiting for home to place pump and says she knows what to do for her diabetes. Strongly recommend very close follow up w endocrinology     #2  Trichomoniasis:  Noted on UA; started Flagyl 500 mg IV bid and now on PO, total of 7 days (end after 7/19)    Discharge Exam  BP (!) 124/94 Comment: manual  Pulse 106   Temp 98.6 °F (37 °C) (Oral)   Resp 16   Ht 5' 1\" (1.549 m)   Wt 102 lb (46.3 kg)   SpO2 96%   BMI 19.27 kg/m²   General Appearance: alert and oriented to person, place and time and in no acute distress  Skin: warm and dry  Head: normocephalic and atraumatic  Eyes: pupils equal, round, and reactive to light, extraocular eye movements intact, conjunctivae normal  Neck: neck supple and non tender without mass   Pulmonary/Chest: clear to auscultation bilaterally- no wheezes, rales or rhonchi, normal air movement, no respiratory distress  Cardiovascular: normal rate, normal S1 and S2 and no carotid bruits  Abdomen: soft, non-tender, non-distended, normal bowel sounds, no masses or organomegaly  Extremities: no cyanosis, no clubbing and no edema  Neurologic: no cranial nerve deficit and speech normal    No intake/output data recorded. No intake/output data recorded. Labs  Recent Labs     07/13/20  0400 07/13/20  0825 07/15/20  1054    139 138   K 4.6 3.3* 3.4*    99 100   CO2 27 31* 29   BUN 15 12 6   CREATININE 0.6 0.5 0.5   GLUCOSE 184* 189* 147*   CALCIUM 8.9 8.4* 9.1       Imaging  Xr Chest Portable    Result Date: 7/12/2020  Interval insertion of left internal jugular central venous catheter, as described. No sign of acute airspace disease. Xr Chest Portable    Result Date: 7/12/2020  No sign of acute pulmonary airspace disease.      Patient Instructions     Medication List      START taking these medications    metroNIDAZOLE 500 MG tablet  Commonly known as:  FLAGYL  Take 1 tablet by mouth every 8 hours for 4 days     traMADol 50 MG tablet  Commonly known as:  ULTRAM  Take 1 tablet by mouth every 6 hours as needed for Pain for up to 3 days.         CONTINUE taking these medications    acetone (urine) test strip  Use daily as directed if bs >250 x2 or illness     FREESTYLE LITE strip  Generic drug:  blood glucose test strips  Checks BS four times a day before meals and at bedtime and also as needed for high and low blood sugar     Insulin Pump - Insulin regular        STOP taking these medications    Basaglar KwikPen 100 UNIT/ML injection pen  Generic drug:  insulin glargine     BD Pen Needle Camila U/F 32G X 4 MM Misc  Generic drug:  Insulin Pen Needle     insulin lispro 100 UNIT/ML injection vial  Commonly known as:  HumaLOG           Where to Get Your Medications      You can get these medications from any pharmacy    Bring a paper prescription for each of these medications  · metroNIDAZOLE 500 MG tablet  · traMADol 50 MG tablet       Note that more than 30 minutes was spent in preparing discharge papers, discussing discharge with patient, medication review, etc.    Electronically signed by Kimberli Cuevas DO on 7/15/2020 at 4:32 PM

## 2020-07-15 NOTE — PROGRESS NOTES
[facility]) Hospitalist Progress Note    Admission Date  7/12/2020 10:55 AM  Chief Complaint Hyperglycemia  Admit Dx   DKA, type 1, not at goal [facility]) [E10.10]    Subjective  History of Present Illness  7/12  Bobbi Bell is a 24F w PMH T1DM on insulin pump inserted recently who began feeling like she was slipping back into DKA yesterday and became nauseous w emesis. Found to be in significant DKA in ED and admitted to ICU. Pt had been seen by Dr. Bette Kruse w endocrinology (virtual visit) on 7/1 after being [facility] 28 6/30 for an episode of DKA. She was started on insulin pump that day w instruction to monitor BGs ACHS and return in one week (7/8). She unfortunately failed to follow up. She is unsure of her settings but reports checking BGs as instructed but noticed they were going up last evening. She says the pump did not run out of insulin, her diet did not drastically change, and she has not been sick recently. 7/13  Gap closed since midnight. -180. Still tachycardic in the 120s. Patient's symptoms are improved today, still complaining of generalized pain, greatest in the lower back. In no distress, was actually sleeping when I went to the room. Technically not DKA, we are having some issue as the patient has an insulin pump, though pharmacy is working with the medical team in working on an insulin regimen/transition plan. Potentially out of the unit today. 7/14  Seen at bedside on the seventh floor. Patient notes a sore throat was otherwise feeling at baseline. She does not currently have the equipment to put her pump back in, she needs a site which she says her mother will bring in. Would like to get this started today and monitor her blood sugars prior to discharge so that we know it is well controlled. No other new issues and no family is present. 7/15  Pt reported to have headaches associated w morphine and switched to PO Percocet last night. No other acute overnight issues.   BG up to 282 this AM.  Pt feeling well, no new issues. Mother did bring in pump supplies but currently not hooked up, pt says that per her understanding plan was to start pump after dc but would like to see BGs while she is on it to make sure they are controlled. Review of Systems - 12-point review of systems has been reviewed and is otherwise negative except as listed in the HPI    Objective  Physical Exam  Vitals: BP (!) 155/100   Pulse 106   Temp 98.6 °F (37 °C) (Oral)   Resp 16   Ht 5' 1\" (1.549 m)   Wt 91 lb (41.3 kg)   SpO2 96%   BMI 17.19 kg/m²   General: well-developed, well-nourished, no acute distress, cooperative  Skin: warm, dry, intact, normal color without cyanosis  HEENT: normocephalic, atraumatic, mucous membranes normal  Respiratory: clear to auscultation bilaterally without respiratory distress  Cardiovascular: regular rate and rhythm without murmur / rub / gallop  Abdominal: soft, nontender, nondistended, normoactive bowel sounds  Extremities: no mottling, pulses intact, no edema  Neurologic: awake, alert, no focal deficits  Psychiatric: normal affect, cooperative    Labs  Recent Labs     07/12/20  1200  07/13/20  0000 07/13/20  0400 07/13/20  0825      < > 141 141 139   K 5.4*   < > 3.0* 4.6 3.3*   CO2 6*   < > 22 27 31*   BUN 21*   < > 17 15 12   CREATININE 0.7   < > 0.7 0.6 0.5   GLUCOSE 589*   < > 260* 184* 189*   CALCIUM 10.5*   < > 9.3 8.9 8.4*   WBC 17.9*  --   --   --   --    RBC 4.68  --   --   --   --    HGB 13.3  --   --   --   --    HCT 41.8  --   --   --   --      --   --   --   --     < > = values in this interval not displayed. Radiology  Xr Chest Portable  Result Date: 7/12/2020  Interval insertion of left internal jugular central venous catheter, as described. No sign of acute airspace disease. Xr Chest Portable  Result Date: 7/12/2020  No sign of acute pulmonary airspace disease.      Assessment / Plan  #1  T1DM with DKA resolved: IVF / insulin / BMP

## 2020-07-15 NOTE — PROGRESS NOTES
7/15/2020- Pt declined visit from diabetes education and floor dietitian, patient feels she knows what to do for diabetes care. Thank you for consult.

## 2020-07-15 NOTE — PROGRESS NOTES
[facility] Quality Flow/Interdisciplinary Rounds Progress Note        Quality Flow Rounds held on July 15, 2020    Disciplines Attending:  Bedside Nurse, ,  and Nursing Unit Leadership    Donavan Garner was admitted on 7/12/2020 10:55 AM    Anticipated Discharge Date:  Expected Discharge Date: 07/14/20    Disposition:    Gavino Score:  Gavino Scale Score: 22    Readmission Risk              Risk of Unplanned Readmission:        26           Discussed patient goal for the day, patient clinical progression, and barriers to discharge.   The following Goal(s) of the Day/Commitment(s) have been identified:  Discharge [address] [place]  July 15, 2020

## 2020-07-15 NOTE — PROGRESS NOTES
Comprehensive Nutrition Assessment    Type and Reason for Visit:  Initial, Positive Nutrition Screen(N/V, Poor Intake)    Nutrition Recommendations/Plan: Continue Diet. Will Start Glucerna Diabetic ONS BID. Nutrition Assessment:  Pt adm w/ n/v and weakness x ~1 day pta 2/2 DKA per pt/EMR review. Pt is at risk d/t Severe Malnutrition w/ severe wasting and ~19% wt loss x ~8 months 2/2 poor appetite/intake and uncontrolled DM. Will Start ONS to aid in PO intake. Pt declines any diet edu at this time. Malnutrition Assessment:  Malnutrition Status:  Severe malnutrition    Context:  Chronic Illness     Findings of the 6 clinical characteristics of malnutrition:  Energy Intake:  7 - 75% or less estimated energy requirements for 1 month or longer  Weight Loss:  7 - Greater than 10% over 6 months(~19% wt loss x ~8 months)     Body Fat Loss:  7 - Severe body fat loss Orbital, Triceps   Muscle Mass Loss:  7 - Severe muscle mass loss Temples (temporalis), Clavicles (pectoralis & deltoids), Thigh (quadraceps), Calf (gastrocnemius), Hand (interosseous), Scapula (trapezius)  Fluid Accumulation:  No significant fluid accumulation     Strength:  Not Performed    Estimated Daily Nutrient Needs:  Energy (kcal):  7315-1125(MSJ x 1.3 SF per CBW); Weight Used for Energy Requirements:  Current     Protein (g):  60-70(1.3-1.5 gm/kg per CBW); Weight Used for Protein Requirements:  Current        Fluid (ml/day):  9079-3706(1 ml/kcal);  Weight Used for Fluid Requirements:  Current      Nutrition Related Findings:  A&O, dentition WNL, Abd/BS WDL, No Edema, No I/O's      Wounds:  None       Current Nutrition Therapies:    DIET CARB CONTROL; Carb Control: 4 carbs/meal (approximate 1800 kcals/day)  Dietary Nutrition Supplements: Diabetic Oral Supplement    Anthropometric Measures:  · Height: 5' 1\" (154.9 cm)  · Current Body Weight: 102 lb (46.3 kg)(bed 7/15)   · Admission Body Weight: 102 lb (46.3 kg)(bed 7/15)    · Usual Body Weight: 126 lb (57.2 kg)(bed 10/28/19 per EMR, pt confirms at this time)     · Ideal Body Weight: 105 lbs; 97.1 lbs   · BMI: 19.3  · Adjusted Body Weight:  ; No Adjustment   · BMI Categories: Normal Weight (BMI 18.5-24. 9)       Nutrition Diagnosis:   · Severe malnutrition, In context of chronic illness related to endocrine dysfuntion(2/2 Uncontrolled DM w/ Poor Appetite) as evidenced by intake 0-25%, poor intake prior to admission, weight loss, severe loss of subcutaneous fat, severe muscle loss(~19% wt loss x ~8 months)      Nutrition Interventions:   Food and/or Nutrient Delivery:  Continue Current Diet, Start Oral Nutrition Supplement(Continue Diet. Will Start Glucerna Diabetic ONS BID.)  Nutrition Education/Counseling:  (Education Declined at this time)   Coordination of Nutrition Care:  Continued Inpatient Monitoring    Goals:  PO intake >50% of meals/ONS.        Nutrition Monitoring and Evaluation:   Behavioral-Environmental Outcomes:  Readiness for Change   Food/Nutrient Intake Outcomes:  Diet Advancement/Tolerance, Food and Nutrient Intake, Supplement Intake  Physical Signs/Symptoms Outcomes:  Biochemical Data, Nausea or Vomiting, Fluid Status or Edema, Nutrition Focused Physical Findings, Skin, Weight     Discharge Planning:    Continue Oral Nutrition Supplement, Continue current diet     Electronically signed by Bing Harden RD, LD on 7/15/20 at 3:18 PM EDT    Contact: ext [phone]

## 2020-07-15 NOTE — PLAN OF CARE
Problem: Malnutrition  (NI-5.2)  Goal: Food and/or Nutrient Delivery  Continue Diet. Will Start Glucerna Diabetic ONS BID. Description: Individualized approach for food/nutrient provision.   Outcome: Met This Shift

## 2020-07-15 NOTE — PROGRESS NOTES
Dr. Yenny Cleaning notified of patient's persistent headache that has been correlating with ordered IV morphine. See new order.

## 2020-07-15 NOTE — PLAN OF CARE
Problem: Pain:  Goal: Pain level will decrease  Description: Pain level will decrease  7/15/2020 1045 by Luz Elena Snowden RN  Outcome: Met This Shift     Problem: Pain:  Goal: Control of acute pain  Description: Control of acute pain  7/15/2020 1045 by Luz Elena Snowden RN  Outcome: Met This Shift     Problem: Falls - Risk of:  Goal: Will remain free from falls  Description: Will remain free from falls  7/15/2020 1045 by Luz Elena Snowden RN  Outcome: Met This Shift     Problem: Falls - Risk of:  Goal: Absence of physical injury  Description: Absence of physical injury  Outcome: Met This Shift

## 2020-07-15 NOTE — PROGRESS NOTES
tlc removed without difficulty. Tip intact. dsd in place. No bleeding noted to site. Patient anxious for discharge. Denies any questions or concerns. Instructed to resume insulin pump as directed by  And continue protein drinks. Scripts given and discussed.

## 2020-07-15 NOTE — PROGRESS NOTES
Physician Progress Note      Jessy Tran  CSN #:                  [id number]  :                       1995  ADMIT DATE:       2020 10:55 AM  DISCH DATE:  RESPONDING  PROVIDER #:        Leatha WASHBURN DO          QUERY TEXT:    Patient admitted with DKA. If possible, please document in progress notes and   discharge summary if you are evaluating and /or treating any of the following: The medical record reflects the following:  Risk Factors:  Per Dietician\"chronic illness related to endocrine   dysfuntion(2/2 Uncontrolled DM w/ Poor Appetite) as evidenced by intake 0-25%,   poor intake prior to admission, weight loss, severe loss of subcutaneous fat,   severe muscle loss(  19% wt loss x   8 months)\"  Clinical Indicators: Per Dietician\". . Pt adm w/ n/v and weakness x   1 day pta 2/2 DKA per pt/EMR review. Pt is at risk d/t Severe Malnutrition w/   severe wasting and   19% wt loss x   8 months 2/2 poor appetite/intake and uncontrolled DM. Will Start ONS to aid   in PO intake. Pt declines any diet edu at this time  . Gasper Bustos \"  Treatment: Diabetic Oral Supplement    Thank you,  Ritchie Lim RN  Clinical Documentation Improvement Specialist  [email or web address]  Options provided:  -- Protein calorie malnutrition severe  -- Underweight with BMI ***  -- Refer to Clinical Documentation Reviewer    PROVIDER RESPONSE TEXT:    This patient has severe protein calorie malnutrition.     Query created by: See Myers on 7/15/2020 4:30 PM      Electronically signed by:  Barbara Law DO 7/15/2020 4:43 PM

## 2020-07-18 LAB
BLOOD CULTURE, ROUTINE: NORMAL
CULTURE, BLOOD 2: NORMAL

## 2020-07-27 ENCOUNTER — HOSPITAL ENCOUNTER (EMERGENCY)
Age: 25
Discharge: HOME | End: 2020-07-27
Payer: COMMERCIAL

## 2020-07-27 VITALS
RESPIRATION RATE: 16 BRPM | WEIGHT: 104 LBS | BODY MASS INDEX: 19.63 KG/M2 | DIASTOLIC BLOOD PRESSURE: 85 MMHG | TEMPERATURE: 98.2 F | SYSTOLIC BLOOD PRESSURE: 140 MMHG | OXYGEN SATURATION: 98 % | HEIGHT: 61 IN | HEART RATE: 116 BPM

## 2020-07-27 PROCEDURE — 99282 EMERGENCY DEPT VISIT SF MDM: CPT

## 2020-07-27 RX ORDER — HYDROCODONE BITARTRATE AND ACETAMINOPHEN 5; 325 MG/1; MG/1
1 TABLET ORAL EVERY 6 HOURS PRN
Qty: 20 TABLET | Refills: 0 | Status: SHIPPED | OUTPATIENT
Start: 2020-07-27 | End: 2020-08-01

## 2020-07-27 RX ORDER — BACLOFEN 10 MG/1
10 TABLET ORAL 2 TIMES DAILY
Qty: 30 TABLET | Refills: 0 | Status: SHIPPED | OUTPATIENT
Start: 2020-07-27 | End: 2020-08-07

## 2020-07-27 RX ORDER — LIDOCAINE 50 MG/G
1 PATCH TOPICAL EVERY 24 HOURS
Qty: 10 PATCH | Refills: 0 | Status: SHIPPED | OUTPATIENT
Start: 2020-07-27 | End: 2020-08-06

## 2020-07-27 ASSESSMENT — PAIN SCALES - GENERAL: PAINLEVEL_OUTOF10: 10

## 2020-07-28 NOTE — ED PROVIDER NOTES
(36.8 °C) (Temporal)   Resp 16   Ht 5' 1\" (1.549 m)   Wt 104 lb (47.2 kg)   SpO2 98%   BMI 19.65 kg/m²      Oxygen Saturation Interpretation: Normal.    Constitutional:  Alert and oriented x4, development consistent with age, NAD  HEENT:  NC/NT. No bruising or lacerations, Airway patent. Neck:  Normal ROM. Supple. No meningeal signs. Non-tender    Respiratory:  Clear to auscultation and breath sounds equal.  CV:  Regular rate and rhythm  GI:  Abdomen soft, nontender, non-distended, No firm or pulsatile mass. Back: lower lumbar spine midline. Tenderness: Moderate. Swelling: no.              Range of Motion: full range with pain. Skin:  no erythema, rash or swelling noted. Distal Function:              Motor deficit: none. Sensory deficit: none. Pulse deficit: none. Extremities: Full ROM x4,  No edema or tenderness   Straight leg raising: negative bilaterally  Integument:  Normal turgor. Warm, dry, without visible rash. Neurological: CN II-XII grossly intact, Motor functions intact   Gait: normal.    Lab / Imaging Results   (All laboratory and radiology results have been personally reviewed by myself)  Labs:  No results found for this visit on 07/27/20. Imaging: All Radiology results interpreted by Radiologist unless otherwise noted. No orders to display     ED Course / Medical Decision Making   Medications - No data to display     Consult(s):  None    Procedure(s):  None    Medical Decision Making: At this time the patient is without objective evidence of an acute process requiring inpatient management. Counseling: The emergency provider has spoken with the patient/caregiver and discussed todays results, in addition to providing specific details for the plan of care and counseling regarding the diagnosis and prognosis. Questions are answered at this time and they are agreeable with the plan.   I discussed the differential,

## 2020-08-03 ENCOUNTER — HOSPITAL ENCOUNTER (EMERGENCY)
Age: 25
Discharge: HOME | End: 2020-08-03
Payer: COMMERCIAL

## 2020-08-03 VITALS
WEIGHT: 95 LBS | SYSTOLIC BLOOD PRESSURE: 127 MMHG | RESPIRATION RATE: 16 BRPM | HEIGHT: 61 IN | OXYGEN SATURATION: 98 % | HEART RATE: 77 BPM | DIASTOLIC BLOOD PRESSURE: 89 MMHG | TEMPERATURE: 96.8 F | BODY MASS INDEX: 17.94 KG/M2

## 2020-08-03 PROCEDURE — 96372 THER/PROPH/DIAG INJ SC/IM: CPT

## 2020-08-03 PROCEDURE — 6360000002 HC RX W HCPCS: Performed by: PHYSICIAN ASSISTANT

## 2020-08-03 PROCEDURE — 99282 EMERGENCY DEPT VISIT SF MDM: CPT

## 2020-08-03 RX ORDER — OXYCODONE HYDROCHLORIDE AND ACETAMINOPHEN 5; 325 MG/1; MG/1
1 TABLET ORAL EVERY 6 HOURS PRN
Qty: 12 TABLET | Refills: 0 | Status: SHIPPED | OUTPATIENT
Start: 2020-08-03 | End: 2020-08-06

## 2020-08-03 RX ORDER — ORPHENADRINE CITRATE 30 MG/ML
60 INJECTION INTRAMUSCULAR; INTRAVENOUS ONCE
Status: COMPLETED | OUTPATIENT
Start: 2020-08-03 | End: 2020-08-03

## 2020-08-03 RX ORDER — KETOROLAC TROMETHAMINE 30 MG/ML
30 INJECTION, SOLUTION INTRAMUSCULAR; INTRAVENOUS ONCE
Status: COMPLETED | OUTPATIENT
Start: 2020-08-03 | End: 2020-08-03

## 2020-08-03 RX ORDER — KETOROLAC TROMETHAMINE 10 MG/1
10 TABLET, FILM COATED ORAL EVERY 8 HOURS PRN
Qty: 20 TABLET | Refills: 0 | Status: SHIPPED | OUTPATIENT
Start: 2020-08-03 | End: 2020-08-12

## 2020-08-03 RX ADMIN — KETOROLAC TROMETHAMINE 30 MG: 30 INJECTION, SOLUTION INTRAMUSCULAR at 19:30

## 2020-08-03 RX ADMIN — ORPHENADRINE CITRATE 60 MG: 30 INJECTION INTRAMUSCULAR; INTRAVENOUS at 19:30

## 2020-08-03 ASSESSMENT — PAIN DESCRIPTION - LOCATION: LOCATION: BACK

## 2020-08-03 ASSESSMENT — PAIN DESCRIPTION - PAIN TYPE: TYPE: ACUTE PAIN

## 2020-08-03 ASSESSMENT — PAIN SCALES - GENERAL
PAINLEVEL_OUTOF10: 10
PAINLEVEL_OUTOF10: 10

## 2020-08-03 ASSESSMENT — PAIN DESCRIPTION - ONSET: ONSET: ON-GOING

## 2020-08-03 ASSESSMENT — PAIN DESCRIPTION - ORIENTATION: ORIENTATION: LOWER;RIGHT

## 2020-08-03 ASSESSMENT — PAIN DESCRIPTION - PROGRESSION: CLINICAL_PROGRESSION: GRADUALLY WORSENING

## 2020-08-03 ASSESSMENT — PAIN DESCRIPTION - FREQUENCY: FREQUENCY: CONTINUOUS

## 2020-08-03 NOTE — ED PROVIDER NOTES
[facility]       Department of Emergency Medicine   ED  Provider Note  Admit Date/RoomTime: 8/3/2020  6:39 PM  ED Room: 31/31  Chief Complaint:   Back Pain (lower back pain. left sided. dx with herniated disc.  )    History of Present Illness   Source of history provided by:  patient. History/Exam Limitations: none. Hillary Bennett is a 27 y.o. old female who has a past medical history of:   Past Medical History:   Diagnosis Date    Bleeding at insertion site 1/5/2018    Common femoral artery injury, right, initial encounter 1/5/2018    DM type 1 (diabetes mellitus, type 1) ([facility] [address]     presents to the emergency department by private vehicle, persistent for lower back pain which radiates down her left leg. Patient states the pain is mild in severity and describes it as aching. Patient states the pain is worse with movement and denies anything making it better. Patient states she was here a few days ago and had a CT scan which showed a herniated disc. Patient then had an MRI which appeared to be normal.  Patient denies saddle paresthesias or loss of bowel or bladder function. Denies fever/chills, headache, vision change, dizziness, chest pain, dyspnea, abdominal pain, NVD, numbness/weakness. ROS   Pertinent positives and negatives are stated within HPI, all other systems reviewed and are negative. Past Surgical History:   Procedure Laterality Date    ABDOMEN SURGERY N/A 5/9/2019    INCISION AND DRAINAGE OF SUPRAPUBIC ABSESS performed by Kelton Mitchell MD at [facility]      last yr   Social History:  reports that she has never smoked. She has never used smokeless tobacco. She reports that she does not drink alcohol or use drugs. Family History: family history includes Diabetes in her maternal grandmother and paternal grandmother; Stroke in an other family member. Allergies: Patient has no known allergies.     Physical Exam           ED Triage Vitals   BP Temp Temp Source Pulse Resp SpO2 Height Weight   08/03/20 1708 08/03/20 1645 08/03/20 1708 08/03/20 1708 08/03/20 1708 08/03/20 1708 08/03/20 1708 08/03/20 1708   127/89 97.7 °F (36.5 °C) Tympanic 77 16 98 % 5' 1\" (1.549 m) 95 lb (43.1 kg)      Oxygen Saturation Interpretation: Normal.    Constitutional:  Alert, development consistent with age. HEENT:  NC/NT. Airway patent. Neck:  Normal ROM. Supple. Respiratory:  Clear to auscultation and breath sounds equal.  CV:  Regular rate and rhythm, normal heart sounds, without pathological murmurs, ectopy, gallops, or rubs. GI:  Abdomen Soft, nontender, good bowel sounds. No firm or pulsatile mass. Back: lower lumbar spine midline. Tenderness: Mild. Swelling: no.              Range of Motion: full range of motion. CVA Tenderness: No.            Straight leg raising:  Bilateral negative. Skin:  no erythema, rash or swelling noted. Distal Function:              Motor deficit: none. Sensory deficit: none. Pulse deficit: none. Calf Tenderness:  No Bilateral.               Edema:  none Both lower extremity(s). Reflexes: Bilateral patellar normal.  Gait:  normal.  Integument:  Normal turgor. Warm, dry, without visible rash. Lymphatics: No lymphangitis or adenopathy noted. Neurological:  Oriented. Motor functions intact. Lab / Imaging Results   (All laboratory and radiology results have been personally reviewed by myself)  Labs:  No results found for this visit on 08/03/20. Imaging: All Radiology results interpreted by Radiologist unless otherwise noted.   No orders to display       ED Course / Medical Decision Making     Medications   ketorolac (TORADOL) injection 30 mg (30 mg Intramuscular Given 8/3/20 1930)   orphenadrine (NORFLEX) injection 60 mg (60 mg Intramuscular Given 8/3/20 1930)            Consult(s):   None    Procedure(s):   none    Medical Decision

## 2020-08-07 ENCOUNTER — HOSPITAL ENCOUNTER (OUTPATIENT)
Age: 25
Discharge: HOME | End: 2020-08-09
Payer: COMMERCIAL

## 2020-08-07 ENCOUNTER — OFFICE VISIT (OUTPATIENT)
Dept: PAIN MANAGEMENT | Age: 25
End: 2020-08-07
Payer: COMMERCIAL

## 2020-08-07 VITALS
RESPIRATION RATE: 18 BRPM | WEIGHT: 95 LBS | HEIGHT: 61 IN | DIASTOLIC BLOOD PRESSURE: 68 MMHG | BODY MASS INDEX: 17.94 KG/M2 | TEMPERATURE: 96.9 F | SYSTOLIC BLOOD PRESSURE: 110 MMHG | HEART RATE: 56 BPM | OXYGEN SATURATION: 98 %

## 2020-08-07 LAB — SPECIFIC GRAVITY UA: <=1.005 (ref 1–1.03)

## 2020-08-07 PROCEDURE — G0480 DRUG TEST DEF 1-7 CLASSES: HCPCS

## 2020-08-07 PROCEDURE — 80307 DRUG TEST PRSMV CHEM ANLYZR: CPT

## 2020-08-07 PROCEDURE — 99204 OFFICE O/P NEW MOD 45 MIN: CPT | Performed by: PHYSICIAN ASSISTANT

## 2020-08-07 PROCEDURE — 1036F TOBACCO NON-USER: CPT | Performed by: PHYSICIAN ASSISTANT

## 2020-08-07 PROCEDURE — 1111F DSCHRG MED/CURRENT MED MERGE: CPT | Performed by: PHYSICIAN ASSISTANT

## 2020-08-07 PROCEDURE — G8427 DOCREV CUR MEDS BY ELIG CLIN: HCPCS | Performed by: PHYSICIAN ASSISTANT

## 2020-08-07 PROCEDURE — G8419 CALC BMI OUT NRM PARAM NOF/U: HCPCS | Performed by: PHYSICIAN ASSISTANT

## 2020-08-07 RX ORDER — OXYCODONE HYDROCHLORIDE AND ACETAMINOPHEN 5; 325 MG/1; MG/1
1 TABLET ORAL EVERY 6 HOURS PRN
COMMUNITY
End: 2020-08-12

## 2020-08-07 NOTE — PROGRESS NOTES
Do you currently have any of the following:    Fever: No  Headache:  No  Cough: No  Shortness of breath: No  Exposed to anyone with these symptoms: No         Maximo Philip presents to the [address] [place] [address] on 8/7/2020. Deanne Hernandez is complaining of pain in the back and stomach area . The pain is constant. The pain is described as aching, throbbing, shooting and stabbing. Pain is rated on her best day at a 10, on her worst day at a 10, and on average at a 10 on the VAS scale. She took her last dose of Percocet today  She stated'' she takes two at a times because they dont work ''. Any procedures since your last visit: No,   Pacemaker or defibrilator: No managed by none. She is not on NSAIDS and is not on anticoagulation medication  /68   Pulse 56   Temp 96.9 °F (36.1 °C)   Resp 18   Ht 5' 1\" (1.549 m)   Wt 95 lb (43.1 kg)   SpO2 98%   BMI 17.95 kg/m²      No LMP recorded.

## 2020-08-07 NOTE — PROGRESS NOTES
[address] [facility]  [address] [facility], [address]          Consult Note      Patient:Diana Calvin,  1995    Date of Service:  20    Requesting Physician:  ED    Reason for Consult:  Lower back pain    Patient presents with complaints of bilateral back, left buttock and left leg pain that started 5 months ago and has been getting worse. HISTORY OF PRESENT ILLNESS:      She states the pain began following  Had uncontrolled BS and \"blacked out\" and was found on the floor. This occurred in 2020. Pain is constant and is described as sharp and stabbing. She rates the pain as a 10/10 on her worst day and a 7/10 on her best day, on the VAS scale. Pain does radiate to left leg. She  does not have numbness, tingling, weakness of the both lower extremities and does not have bladder or bowel dysfunction. Alleviating factors include: heat and pain medication. Aggravating factors include:  sitting. She states that the pain does keep her from sleeping at night. She has been on anticoagulation medications to include NSAIDS and has not been on herbal supplements. She is diabetic- type 1 - at around age 10. Reports uncontrolled. Imagin2020 MRI Lumbar Spine (in care everywhere)    RESULT:    Counting reference:  Lumbosacral junction.  For the purposes of this   report, L4-5 is considered the level of the iliac crest and the last   lumbar-type disc space is considered L5/S1. Coronal localizer images reveal a mild dextroscoliosis centered at the   L2/L3 level.     Sagittal images reveal normal height, alignment, and bone marrow signal   intensity of the lumbar vertebral bodies.  There are no pars   interarticular is defects.  There is no bone marrow edema.  The   intervertebral disc spaces are maintained in height and demonstrate   normal signal intensity.  The conus medullaris terminates at the T12/L1   level and appears unremarkable. Constancia Border are no prevertebral masses. Rajni Jc   is a partially imaged T2 hyperintensity at the pelvis which may reflect a   distended urinary bladder. Axial images at T12/L1 through L5/S1, inclusive, reveal no compression of   the thecal sac nor the neural foramina. 05/01/2020 CT Lumbar Spine    FINDINGS:         There is heterogenous enhancement of the medullary portions of the    kidneys bilaterally which may be due to exact timing of injection and    image acquisition, however, underlying renal medical or structural    renal disease would be of concern. Further workup as clinically    indicated.         Cystic mass in the right adnexa is probably originating from the right    ovary. Measuring 2.5 cm, this is within physiologic size range. There    is a small amount of free fluid in the posterior cul-de-sac suggesting    that this may be an involuting recently ruptured ovarian cyst.         Incidental nabothian cysts are identified in the uterine cervix. There    is prominent periuterine vasculature suggesting an element of pelvic    congestion syndrome.         There is no evidence of acute displaced cortical disruption or    spondylolisthesis. The vertebral bodies and discs are essentially    normal in vertical dimension and alignment is maintained. Circumferential disc bulge, ligament flavum hypertrophy and    degenerative facet arthropathy results in mild central spinal canal    stenosis at L4-5. No other evidence to suggest central or foraminal    stenosis.  The arterial and venous structures enhance appropriately.         The remainder of the regional soft tissue and osseous structures are    unremarkable.              Impression         No evidence of acute fracture or spondylolisthesis on CT of the lumbar    spine.         Mild central spinal canal stenosis at L4-5 due to circumferential disc    bulge, ligament flavum hypertrophy and degenerative facet arthropathy.         Heterogeneous enhancement of the medullary portions of the kidneys    bilaterally which may be due to exact timing of injection and image    acquisition, however, underlying renal medical or structural renal    disease would be of concern. Further workup as clinically indicated.         Cystic mass in the right adnexa is probably originating from the right    ovary. Measuring 2.5 cm, this is within physiologic size range. There    is a small amount of free fluid in the posterior cul-de-sac suggesting    that this may be an involuting recently ruptured ovarian cyst.         Prominent periuterine vasculature suggesting an element of pelvic    congestion syndrome. Previous treatments: medications. .  One time PT visit (very painful). Work Hx: unemployed. She is a  by [email or web address] but not currently working. Currently in Litigation: No     PersonalExpectations from this treatment: return to work, increase activity and decrease pain    Past Medical History:   Diagnosis Date    Bleeding at insertion site 1/5/2018    Common femoral artery injury, right, initial encounter 1/5/2018    DM type 1 (diabetes mellitus, type 1) ([facility] [address]        Past Surgical History:   Procedure Laterality Date    ABDOMEN SURGERY N/A 5/9/2019    INCISION AND DRAINAGE OF SUPRAPUBIC ABSESS performed by Kofi Vance MD at [address] [facility] ([place], [place])      last yr       Prior to Admission medications    Medication Sig Start Date End Date Taking? Authorizing Provider   oxyCODONE-acetaminophen (PERCOCET) 5-325 MG per tablet Take 1 tablet by mouth every 6 hours as needed for Pain. Yes Historical Provider, MD   ketorolac (TORADOL) 10 MG tablet Take 1 tablet by mouth every 8 hours as needed for Pain 8/3/20  Yes Ramona Linda PA-C   Insulin Pump - Insulin regular Inject 1 Units/hr into the skin continuous Insulin-to-Carb Ratio (ICR):   Insulin Sensitivity Factor (ISF): mg/dL per unit of insulin  Target Blood Glucose: mg/dL  Bolus OF SYSTEMS:     Patient specifically denies fever/chills, chest pain, shortness of breath, new bowel or bladder complaints or suicidal ideations. All other review of systems was negative. Review of Systems    PHYSICAL EXAMINATION:      /68   Pulse 56   Temp 96.9 °F (36.1 °C)   Resp 18   Ht 5' 1\" (1.549 m)   Wt 95 lb (43.1 kg)   SpO2 98%   BMI 17.95 kg/m²     General:      General appearance: awake, alert, oriented, in no acute distress, well developed, well nourished and in no acute distress   pleasant and well-hydrated. , in no distress and A & O x3  Build:Underweight  Function:Moves about room without difficulty. Head:     Head:normocephalic and atraumatic  Pupils:regular, round and equal.  Sclera: icterus absent,   EOM:full and intact. Lungs:    Breathing:Normal expansion. No rales, rhonchi, or wheezing. Abdomen:    Shape:non-distended and normal  Tenderness:none  Guarding:none    Lumbar spine:    Spine inspection:normal   CVA tenderness:No   Palpation:tenderness paravertebral muscles, midline tenderness, facet loading, left, right, positive and tenderness. Range of motion:abnormal moderately Lateral bending, flexion, extension rotationleft and is painful. Musculoskeletal:    SI joint tenderness:negative right, positive left              ROBE test:negative right, positive left  Piriformis tenderness:negative right, negative left  Trochanteric bursa tenderness:negative right, negative left  SLR:negative right, positive left, sitting     Extremities:    Tremors:None bilaterally upper and lower  Range of motion:Generally normal shoulders, pain with internal rotation of hips negative.   Intact:Yes  Varicose veins:not assessed   Cyanosis:none  Edema:Normal      Neurological:    Cranial nerves:normal  Sensory:normal to light touch   Motor:   Right Quadriceps5/5          Left Quadriceps5/5           Right Gastrocnemius5/5    Left Gastrocnemius5/5  Right Ant Tibialis5/5  Left Ant Tibialis5/5  Coordination:normal  Reflexes:    Right Quadriceps reflex2+  Left Quadriceps reflex2+  Right Achilles reflex2+  Left Achilles reflex2+  Gait:normal    Dermatology:    Skin:no unusual rashes, no skin lesions, no palpable subcutaneous nodules and good skin turgor    Assessment/Plan:    ED Follow Up  Lower back pain with radiation to left leg stopping at the knee x 5 months - (\"uncontrolled BS and blacked out. Found on the floor. \") Pain since this episode  CT lumbar spine 5/2020:  Mild central stenosis at L4-L5 due to circumferential disc bulge, deg facet arthropathy  MRI lumbar spine (7/2020): In care everywhere. Unremarkable  Uncontrolled type 1 DM 7/15 - 282    Recommendations:  Start PT:  Will do gentle stretching, aquatherapy, modalities as patient had a bad experience in the past.  Discussed the need to try all conservative measures initially. TENs unit ordered  Compounding pain cream ordered  Failed gabapentin in the past.  Discussed that possibly her dosage was not high enough. She prefers to not try again. Consider lyrica in the future  Discussed potential interventional procedures in the future if she fails conservative modalities. Would have to work closely with Dr. Aliza Lawson as she is high risk for potential BS issues. Reports baclofen not helpful from ED. She does not want to try another muscle relaxer. Urine screen today 08/2020  [place] report reviewed 08/2020  Patient encouraged to stay active and to lose weight  Treatment plan discussed with the patient     Controlled Substance Monitoring:    Acute and Chronic Pain Monitoring:   RX Monitoring 8/7/2020   Periodic Controlled Substance Monitoring Possible medication side effects, risk of tolerance/dependence & alternative treatments discussed. ;No signs of potential drug abuse or diversion identified. ;Assessed functional status. ;Obtaining appropriate analgesic effect of treatment.            ccreferring physic          Electronically signed by KENA Sandoval on 8/7/20 at 9:11 AM EDT

## 2020-08-10 RX ORDER — LIDOCAINE AND PRILOCAINE 25; 25 MG/G; MG/G
CREAM TOPICAL
Qty: 30 G | Refills: 0 | Status: SHIPPED
Start: 2020-08-10 | End: 2020-08-12

## 2020-08-10 RX ORDER — DOXEPIN HYDROCHLORIDE 50 MG/G
CREAM TOPICAL
Qty: 90 G | Refills: 0 | Status: SHIPPED
Start: 2020-08-10 | End: 2020-11-10

## 2020-08-10 RX ORDER — GEL BASE NO.72
GEL (GRAM) TOPICAL
Qty: 120 G | Refills: 0 | Status: SHIPPED
Start: 2020-08-10 | End: 2020-11-10

## 2020-08-12 ENCOUNTER — VIRTUAL VISIT (OUTPATIENT)
Dept: ENDOCRINOLOGY | Age: 25
End: 2020-08-12
Payer: COMMERCIAL

## 2020-08-12 LAB
6AM URINE: <10 NG/ML
CODEINE, URINE: <20 NG/ML
HYDROCODONE, URINE: <20 NG/ML
HYDROMORPHONE, URINE: <20 NG/ML
Lab: NORMAL
MORPHINE URINE: <20 NG/ML
NORHYDROCODONE, URINE: <20 NG/ML
NOROXYCODONE, URINE: 767 NG/ML
NOROXYMORPHONE, URINE: 228 NG/ML
OXYCODONE, URINE CONFIRMATION: 55 NG/ML
OXYMORPHONE, URINE: <20 NG/ML
REPORT: NORMAL
THIS TEST SENT TO: NORMAL

## 2020-08-12 PROCEDURE — 99214 OFFICE O/P EST MOD 30 MIN: CPT | Performed by: INTERNAL MEDICINE

## 2020-08-12 PROCEDURE — 1111F DSCHRG MED/CURRENT MED MERGE: CPT | Performed by: INTERNAL MEDICINE

## 2020-08-12 PROCEDURE — G8427 DOCREV CUR MEDS BY ELIG CLIN: HCPCS | Performed by: INTERNAL MEDICINE

## 2020-08-12 PROCEDURE — 3046F HEMOGLOBIN A1C LEVEL >9.0%: CPT | Performed by: INTERNAL MEDICINE

## 2020-08-12 PROCEDURE — 1036F TOBACCO NON-USER: CPT | Performed by: INTERNAL MEDICINE

## 2020-08-12 PROCEDURE — 2022F DILAT RTA XM EVC RTNOPTHY: CPT | Performed by: INTERNAL MEDICINE

## 2020-08-12 PROCEDURE — G8419 CALC BMI OUT NRM PARAM NOF/U: HCPCS | Performed by: INTERNAL MEDICINE

## 2020-08-12 RX ORDER — PERPHENAZINE 16 MG/1
TABLET, FILM COATED ORAL
Qty: 250 STRIP | Refills: 5 | Status: SHIPPED
Start: 2020-08-12 | End: 2020-11-10

## 2020-08-12 NOTE — PROGRESS NOTES
Luisana Webster was read the following message We want to confirm that, for purposes of billing, this is a virtual visit with your provider for which we will submit a claim for reimbursement with your insurance company. You will be responsible for any copays, coinsurance amounts or other amounts not covered by your insurance company. If you do not accept this, unfortunately we will not be able to schedule a virtual visit with the provider. Do you accept?  Devyn Cutler responded YES

## 2020-08-12 NOTE — PROGRESS NOTES
[address] [place] [address] Department of Endocrinology Diabetes and Metabolism   [address] [facility] [address]   Phone: [phone]  Fax: [phone]    Date of Service: 8/12/2020    Primary Care Physician: Braydon Myers DO. Provider: Blanca Cui MD    Reason for the visit:  Type 1 DM, vitD deficiency      History of Present Illness: The history is provided by the patient. No  was used. Accuracy of the patient data is excellent. Sebastian Arevalo is a very pleasant 27 y.o. female seen today for follow up visit     Sebastian Arevalo was diagnosed with diabetes at age 8 and currently on Basaglar 28 units daily at bedtime, Novolog 1u:10g + 1:50>150   Plan to sart pump in few days   The patient has been checking BS 4 times a day with meals and at bedtime, readings highly variable   Lab Results   Component Value Date    LABA1C 13.0 05/30/2020    LABA1C 15.9 03/09/2020    LABA1C 14.2 08/06/2019    LABA1C 14.0 04/11/2019     No recent hypoglycemia   Her diabetes complicated with gastroparesis   I reviewed current medications and the patient has no issues with diabetes medications  Sebastian Arevalo is up to date with eye exam. Last eye exam was a year ago and denied any history of diabetic retinopathy. The patient performs her own feet care and doesn't see podiatry service   Microvascular complications: + Neuropathy. No Retinopathy, No Nephropathy   Macrovascular complications: no CAD, PVD, or Stroke  The patient receives Flushot every year. She has not received the pneumonia vaccine.     PAST MEDICAL HISTORY   Past Medical History:   Diagnosis Date    Bleeding at insertion site 1/5/2018    Common femoral artery injury, right, initial encounter 1/5/2018    DM type 1 (diabetes mellitus, type 1) ([facility] [address]      PAST SURGICAL HISTORY   Past Surgical History:   Procedure Laterality Date    ABDOMEN SURGERY N/A 5/9/2019    INCISION AND DRAINAGE OF SUPRAPUBIC ABSESS performed by Vega Gaviria MD at [facility]      last yr     SOCIAL HISTORY   Tobacco:   reports that she has never smoked. She has never used smokeless tobacco.  Alcol:   reports no history of alcohol use. Illicit Drugs:   reports no history of drug use. FAMILY HISTORY   Family History   Problem Relation Age of Onset    Diabetes Maternal Grandmother     Diabetes Paternal Grandmother     Stroke Other     Thyroid Disease Neg Hx      ALLERGIES AND DRUG REACTIONS   No Known Allergies    CURRENT MEDICATIONS     Current Outpatient Medications   Medication Sig Dispense Refill    insulin aspart (NOVOLOG) 100 UNIT/ML injection vial Inject into the skin 3 times daily (before meals) PER PUMP      blood glucose test strips (CONTOUR NEXT TEST) strip [employer] Contour test strips. Checks 4 times/day before meals and at bedtime and as needed for symptoms of irregular blood glucose 250 strip 5    doxepin (ZONALON) 5 % cream APPLY ONE GRAM (ONE PUMP) EXTERNALLY THREE TIMES A DAY TO PAIN AREA TO LOWER BACK  90 g 0    Gel Base (VERSAPRO) GEL APPLY ONE GRAM (ONE PUMP) EXTERNALLY FOUR TIMES A DAY TO LOWER BACK  120 g 0    Insulin Pump - Insulin regular Inject 1 Units/hr into the skin continuous Insulin-to-Carb Ratio (ICR): Insulin Sensitivity Factor (ISF): mg/dL per unit of insulin  Target Blood Glucose: mg/dL  Bolus Frequency:        Pt's dosing ranges, pt follows with Dr. Jorge Ramires      Novolog pump      acetone, urine, test strip Use daily as directed if bs >250 x2 or illness 30 strip 0     No current facility-administered medications for this visit. Review of Systems  Constitutional: No fever, no chills, no diaphoresis, no generalized weakness. HEENT: No blurred vision, No sore throat, no ear pain, no hair loss  Neck: denied any neck swelling, difficulty swallowing,   Cardio-pulmonary: No CP, SOB or palpitation, No orthopnea or PND. No cough or wheezing.   GI: No N/V/D, no constipation, No abdominal pain, no melena or hematochezia   : Denied any dysuria, hematuria, flank pain, discharge, or incontinence. Skin: denied any rash, ulcer, Hirsute, or hyperpigmentation. MSK: denied any joint deformity, joint pain/swelling, muscle pain, or back pain. Neuro: + numbness and tingling in lower extremities. OBJECTIVE    There were no vitals taken for this visit. BP Readings from Last 4 Encounters:   08/07/20 110/68   08/03/20 127/89   07/27/20 (!) 140/85   07/15/20 (!) 124/94     Wt Readings from Last 6 Encounters:   08/07/20 95 lb (43.1 kg)   08/03/20 95 lb (43.1 kg)   07/27/20 104 lb (47.2 kg)   07/15/20 102 lb (46.3 kg)   06/30/20 91 lb 7.9 oz (41.5 kg)   05/31/20 104 lb 12.8 oz (47.5 kg)     Physical examination:  Due to this being a TeleHealth encounter, evaluation of the following organ systems is limited: EENT/Resp/CV/GI//MS/Neuro/Skin/Heme-Lymph-Imm. General: awake alert, oriented x3, no abnormal position or movements.   Pulm: move with respiration   Skin: no rash  Psych: normal mood, and affect    Review of Laboratory Data:  I have reviewed the following:  Lab Results   Component Value Date/Time    WBC 17.9 (H) 07/12/2020 12:00 PM    RBC 4.68 07/12/2020 12:00 PM    HGB 13.3 07/12/2020 12:00 PM    HCT 41.8 07/12/2020 12:00 PM    MCV 89.3 07/12/2020 12:00 PM    MCH 28.4 07/12/2020 12:00 PM    MCHC 31.8 (L) 07/12/2020 12:00 PM    RDW 12.2 07/12/2020 12:00 PM     07/12/2020 12:00 PM    MPV 11.3 07/12/2020 12:00 PM      Lab Results   Component Value Date/Time     07/15/2020 10:54 AM    K 3.4 (L) 07/15/2020 10:54 AM    K 4.7 07/12/2020 04:26 PM    CO2 29 07/15/2020 10:54 AM    BUN 6 07/15/2020 10:54 AM    CREATININE 0.5 07/15/2020 10:54 AM    CALCIUM 9.1 07/15/2020 10:54 AM    LABGLOM >60 07/15/2020 10:54 AM    GFRAA >60 07/15/2020 10:54 AM      No results found for: TSH, T4FREE, W8CVGOU, FT3, E4IBWWQ, TSI, TPOABS, THGAB  Lab Results   Component Value Date    LABA1C 13.0 05/30/2020 GLUCOSE 147 07/15/2020    MALBCR 204.6 01/23/2018    LABMICR 102.3 01/23/2018    LABCREA 50 01/23/2018     Lab Results   Component Value Date    CHOL 175 01/24/2018    TRIG 106 01/24/2018    HDL 40 01/24/2018     No results found for: Tanja[facility] [address] Records/Labs/Images review:   I personally reviewed and summarized previous records   All labs were reviewed independently     Via Aisha Doty 60, a 27 y.o.-old female seen in for the following issues     Diabetes Mellitus Type 1    · Patient's diabetes is uncontrolled  · Currently on Basaglar 28 units daily, Novolog before meals 1:10g + ss 1:50>150  · To meet with pump  later this wk to start pump training   · The patient was advised to continue checking blood sugars 4 times a day before meals and at bedtime  · Pt will bring pump for download in a wk   · Discussed with patient A1c and blood sugar goals   · Patient will need routine diabetes maintenance and prevention    Hypoglycemia   · Encourage to use Humalog Carb ratio of 1u:20g for snacks   · To start insulin pump today     vitD deficiency   · Continue  vitD supplement     Return in about 6 weeks (around 9/23/2020) for DM type 1 . The above issues were reviewed with the patient who understood and agreed with the plan. Thank you for allowing us to participate in the care of this patient. Please do not hesitate to contact us with any additional questions. Diagnosis Orders   1. Uncontrolled type 1 diabetes mellitus with hyperglycemia (HCC)  blood glucose test strips (CONTOUR NEXT TEST) strip   2. Insulin pump in place  blood glucose test strips (CONTOUR NEXT TEST) strip   3. Vitamin D deficiency     4. Hypoglycemia         Anna Krishnamurthy MD  Endocrinologist, [facility])   [address] [facility], [address] [facility],[address] [address]   Phone: [phone]  Fax: [phone]  -------------------------   An electronic signature was used to authenticate this note.  Juan Cornejo MD on 8/12/2020 at 8:25 AM  Services were provided through a video synchronous discussion virtually to substitute for in-person clinic visit.

## 2020-08-14 LAB
7-AMINOCLONAZEPAM, URINE: <5 NG/ML
ALPHA-HYDROXYALPRAZOLAM, URINE: <5 NG/ML
ALPHA-HYDROXYMIDAZOLAM, URINE: <20 NG/ML
ALPRAZOLAM, URINE: <5 NG/ML
CHLORDIAZEPOXIDE, URINE: <20 NG/ML
CLONAZEPAM, URINE: <5 NG/ML
DIAZEPAM, URINE: <20 NG/ML
LORAZEPAM, URINE: <20 NG/ML
MIDAZOLAM, URINE: <20 NG/ML
NORDIAZEPAM, URINE: <20 NG/ML
OXAZEPAM, URINE: <20 NG/ML
TEMAZEPAM, URINE: <20 NG/ML

## 2020-08-17 ENCOUNTER — APPOINTMENT (OUTPATIENT)
Dept: GENERAL RADIOLOGY | Age: 25
End: 2020-08-17
Payer: COMMERCIAL

## 2020-08-17 ENCOUNTER — HOSPITAL ENCOUNTER (EMERGENCY)
Age: 25
Discharge: HOME | End: 2020-08-17
Payer: COMMERCIAL

## 2020-08-17 VITALS
BODY MASS INDEX: 17.94 KG/M2 | WEIGHT: 95 LBS | HEIGHT: 61 IN | HEART RATE: 100 BPM | OXYGEN SATURATION: 98 % | TEMPERATURE: 97 F | RESPIRATION RATE: 16 BRPM | SYSTOLIC BLOOD PRESSURE: 130 MMHG | DIASTOLIC BLOOD PRESSURE: 85 MMHG

## 2020-08-17 LAB
ALBUMIN SERPL-MCNC: 3.9 G/DL (ref 3.5–5.2)
ALP BLD-CCNC: 64 U/L (ref 35–104)
ALT SERPL-CCNC: 15 U/L (ref 0–32)
ANION GAP SERPL CALCULATED.3IONS-SCNC: 13 MMOL/L (ref 7–16)
AST SERPL-CCNC: 23 U/L (ref 0–31)
BACTERIA: ABNORMAL /HPF
BASOPHILS ABSOLUTE: 0.03 E9/L (ref 0–0.2)
BASOPHILS RELATIVE PERCENT: 0.7 % (ref 0–2)
BILIRUB SERPL-MCNC: 0.3 MG/DL (ref 0–1.2)
BILIRUBIN URINE: NEGATIVE
BLOOD, URINE: NEGATIVE
BUN BLDV-MCNC: 12 MG/DL (ref 6–20)
CALCIUM SERPL-MCNC: 9.3 MG/DL (ref 8.6–10.2)
CHLORIDE BLD-SCNC: 96 MMOL/L (ref 98–107)
CLARITY: CLEAR
CO2: 26 MMOL/L (ref 22–29)
COLOR: YELLOW
CREAT SERPL-MCNC: 0.6 MG/DL (ref 0.5–1)
EOSINOPHILS ABSOLUTE: 0.01 E9/L (ref 0.05–0.5)
EOSINOPHILS RELATIVE PERCENT: 0.2 % (ref 0–6)
EPITHELIAL CELLS, UA: ABNORMAL /HPF
GFR AFRICAN AMERICAN: >60
GFR NON-AFRICAN AMERICAN: >60 ML/MIN/1.73
GLUCOSE BLD-MCNC: 466 MG/DL (ref 74–99)
GLUCOSE URINE: >=1000 MG/DL
HCG, URINE, POC: NEGATIVE
HCT VFR BLD CALC: 36.4 % (ref 34–48)
HEMOGLOBIN: 12.1 G/DL (ref 11.5–15.5)
IMMATURE GRANULOCYTES #: 0.01 E9/L
IMMATURE GRANULOCYTES %: 0.2 % (ref 0–5)
KETONES, URINE: 15 MG/DL
LEUKOCYTE ESTERASE, URINE: NEGATIVE
LYMPHOCYTES ABSOLUTE: 1.9 E9/L (ref 1.5–4)
LYMPHOCYTES RELATIVE PERCENT: 42.6 % (ref 20–42)
Lab: NORMAL
MCH RBC QN AUTO: 27.8 PG (ref 26–35)
MCHC RBC AUTO-ENTMCNC: 33.2 % (ref 32–34.5)
MCV RBC AUTO: 83.7 FL (ref 80–99.9)
METER GLUCOSE: 387 MG/DL (ref 74–99)
METER GLUCOSE: 492 MG/DL (ref 74–99)
MONOCYTES ABSOLUTE: 0.17 E9/L (ref 0.1–0.95)
MONOCYTES RELATIVE PERCENT: 3.8 % (ref 2–12)
NEGATIVE QC PASS/FAIL: NORMAL
NEUTROPHILS ABSOLUTE: 2.34 E9/L (ref 1.8–7.3)
NEUTROPHILS RELATIVE PERCENT: 52.5 % (ref 43–80)
NITRITE, URINE: NEGATIVE
PDW BLD-RTO: 12.3 FL (ref 11.5–15)
PH UA: 6.5 (ref 5–9)
PLATELET # BLD: 263 E9/L (ref 130–450)
PMV BLD AUTO: 11.2 FL (ref 7–12)
POSITIVE QC PASS/FAIL: NORMAL
POTASSIUM SERPL-SCNC: 4.2 MMOL/L (ref 3.5–5)
PROTEIN UA: NEGATIVE MG/DL
RBC # BLD: 4.35 E12/L (ref 3.5–5.5)
RBC UA: ABNORMAL /HPF (ref 0–2)
SODIUM BLD-SCNC: 135 MMOL/L (ref 132–146)
SPECIFIC GRAVITY UA: 1.01 (ref 1–1.03)
TOTAL PROTEIN: 6.9 G/DL (ref 6.4–8.3)
UROBILINOGEN, URINE: 0.2 E.U./DL
WBC # BLD: 4.5 E9/L (ref 4.5–11.5)
WBC UA: ABNORMAL /HPF (ref 0–5)

## 2020-08-17 PROCEDURE — 2580000003 HC RX 258: Performed by: PHYSICIAN ASSISTANT

## 2020-08-17 PROCEDURE — 6360000002 HC RX W HCPCS: Performed by: PHYSICIAN ASSISTANT

## 2020-08-17 PROCEDURE — 99283 EMERGENCY DEPT VISIT LOW MDM: CPT

## 2020-08-17 PROCEDURE — 36415 COLL VENOUS BLD VENIPUNCTURE: CPT

## 2020-08-17 PROCEDURE — 99284 EMERGENCY DEPT VISIT MOD MDM: CPT

## 2020-08-17 PROCEDURE — 82962 GLUCOSE BLOOD TEST: CPT

## 2020-08-17 PROCEDURE — 81001 URINALYSIS AUTO W/SCOPE: CPT

## 2020-08-17 PROCEDURE — 85025 COMPLETE CBC W/AUTO DIFF WBC: CPT

## 2020-08-17 PROCEDURE — 72100 X-RAY EXAM L-S SPINE 2/3 VWS: CPT

## 2020-08-17 PROCEDURE — 80053 COMPREHEN METABOLIC PANEL: CPT

## 2020-08-17 PROCEDURE — 6370000000 HC RX 637 (ALT 250 FOR IP): Performed by: PHYSICIAN ASSISTANT

## 2020-08-17 RX ORDER — KETOROLAC TROMETHAMINE 30 MG/ML
30 INJECTION, SOLUTION INTRAMUSCULAR; INTRAVENOUS ONCE
Status: DISCONTINUED | OUTPATIENT
Start: 2020-08-17 | End: 2020-08-17

## 2020-08-17 RX ORDER — HYDROCODONE BITARTRATE AND ACETAMINOPHEN 5; 325 MG/1; MG/1
TABLET ORAL
Status: DISCONTINUED
Start: 2020-08-17 | End: 2020-08-17 | Stop reason: HOSPADM

## 2020-08-17 RX ORDER — DEXAMETHASONE SODIUM PHOSPHATE 10 MG/ML
10 INJECTION, SOLUTION INTRAMUSCULAR; INTRAVENOUS ONCE
Status: COMPLETED | OUTPATIENT
Start: 2020-08-17 | End: 2020-08-17

## 2020-08-17 RX ORDER — 0.9 % SODIUM CHLORIDE 0.9 %
1000 INTRAVENOUS SOLUTION INTRAVENOUS ONCE
Status: COMPLETED | OUTPATIENT
Start: 2020-08-17 | End: 2020-08-17

## 2020-08-17 RX ORDER — HYDROCODONE BITARTRATE AND ACETAMINOPHEN 5; 325 MG/1; MG/1
1 TABLET ORAL ONCE
Status: COMPLETED | OUTPATIENT
Start: 2020-08-17 | End: 2020-08-17

## 2020-08-17 RX ORDER — CYCLOBENZAPRINE HCL 10 MG
10 TABLET ORAL NIGHTLY PRN
Qty: 10 TABLET | Refills: 0 | Status: SHIPPED | OUTPATIENT
Start: 2020-08-17 | End: 2020-08-27

## 2020-08-17 RX ORDER — ORPHENADRINE CITRATE 30 MG/ML
60 INJECTION INTRAMUSCULAR; INTRAVENOUS ONCE
Status: DISCONTINUED | OUTPATIENT
Start: 2020-08-17 | End: 2020-08-17

## 2020-08-17 RX ADMIN — HYDROCODONE BITARTRATE AND ACETAMINOPHEN 1 TABLET: 5; 325 TABLET ORAL at 17:59

## 2020-08-17 RX ADMIN — DEXAMETHASONE SODIUM PHOSPHATE 10 MG: 10 INJECTION INTRAMUSCULAR; INTRAVENOUS at 15:19

## 2020-08-17 RX ADMIN — SODIUM CHLORIDE 1000 ML: 9 INJECTION, SOLUTION INTRAVENOUS at 17:00

## 2020-08-17 ASSESSMENT — PAIN SCALES - GENERAL
PAINLEVEL_OUTOF10: 10
PAINLEVEL_OUTOF10: 4
PAINLEVEL_OUTOF10: 8
PAINLEVEL_OUTOF10: 10

## 2020-08-17 NOTE — ED PROVIDER NOTES
[facility]     Department of Emergency Medicine   ED  Provider Note  Admit Date/RoomTime: 8/17/2020  2:30 PM  ED Room: /  Chief Complaint   Back Pain (lower) and Abdominal Pain (bilat lower- was seen previously for same, not better )    History of Present Illness   Source of history provided by:  patient. History/Exam Limitations: none. Maximo Palacios is a 27 y.o. old female with a prior history of recurrent self limited episodes of low back pain in the past and previous herniated disc, presents to the emergency department by private vehicle for acute-on-chronic, aching bilateral lower lumbar spine pain without radiation, for a few week(s) prior to arrival.  The original pain was caused by unknown. There has been history of recent injury, patient states \"my blood sugar dropped low and I end up losing my balance and falling yesterday but I did not hit my head or lose consciousness. \"  Patient is not any blood thinners. Patient states that she recently had an MRI and is seeing the pain and spine [place]. Patient states that they recommended her to physical therapy for which she only did 1 session and stated that it \"hurt too much the next day so I stopped going. \"  Patient states she uses \"OxyContin for the pain and she has run out. \". Since onset the symptoms have been constant and mild in severity. She denies any of the following: bladder incontinence, bladder urgency, bowel incontinence or sacral numbness. Associated Signs & Symptoms: low back pain. The pain is aggraveated by movement and relieved by nothing. There has been no cramping, vomiting, diarrhea, fever, chills, sweats, headache, arthralgias, myalgias, irritability, URI symptoms or cough. She is not currently enrolled in pain management program.      ROS    Pertinent positives and negatives are stated within HPI, all other systems reviewed and are negative.     Past Surgical History:  has a past surgical history that includes Abdomen surgery (N/A, 5/9/2019) and Bartholin gland cyst excision. Social History:  reports that she has never smoked. She has never used smokeless tobacco. She reports that she does not drink alcohol or use drugs. Family History: family history includes Diabetes in her maternal grandmother and paternal grandmother; Stroke in an other family member. Allergies: Patient has no known allergies. Physical Exam           ED Triage Vitals   BP Temp Temp Source Pulse Resp SpO2 Height Weight   08/17/20 1427 08/17/20 1401 08/17/20 1401 08/17/20 1427 08/17/20 1427 08/17/20 1427 08/17/20 1427 08/17/20 1427   (!) 127/90 97 °F (36.1 °C) Tympanic 122 18 100 % 5' 1\" (1.549 m) 95 lb (43.1 kg)      Oxygen Saturation Interpretation: Normal.    Constitutional:  Alert, development consistent with age. NAD  HEENT:  NC/NT. Airway patent. Neck:  Normal ROM. Supple. Respiratory:  Clear to auscultation and breath sounds equal.  CV:  Regular rate and rhythm, normal heart sounds, without pathological murmurs, ectopy, gallops, or rubs. GI:  Abdomen Soft, nontender, good bowel sounds. No firm or pulsatile mass. Nonsurgical abdomen  Back: lower lumbar spine bilateral.             Tenderness: None. Swelling: no.              Range of Motion: full range of motion. CVA Tenderness: No.            Straight leg raising:  Bilateral negative. Skin:  no erythema, rash or swelling noted. No step-off deformities no evidence of abscess, no lymphatic streaking. Patient is neurovascular and sensory intact. Patient has no pain elicited with deep palpation. Distal Function:              Motor deficit: none. Sensory deficit: none. Pulse deficit: none. Calf Tenderness:  No Bilateral.               Edema:  none Both lower extremity(s). Reflexes: Bilateral knee,ankle,biceps normal.  Gait:  normal.  Integument:  Normal turgor.   Warm, dry, without visible 7 - 16 mmol/L    Glucose 466 (H) 74 - 99 mg/dL    BUN 12 6 - 20 mg/dL    CREATININE 0.6 0.5 - 1.0 mg/dL    GFR Non-African American >60 >=60 mL/min/1.73    GFR African American >60     Calcium 9.3 8.6 - 10.2 mg/dL    Total Protein 6.9 6.4 - 8.3 g/dL    Alb 3.9 3.5 - 5.2 g/dL    Total Bilirubin 0.3 0.0 - 1.2 mg/dL    Alkaline Phosphatase 64 35 - 104 U/L    ALT 15 0 - 32 U/L    AST 23 0 - 31 U/L   POC Pregnancy Urine Qual   Result Value Ref Range    HCG, Urine, POC Negative Negative    Lot Number [id number]     Positive QC Pass/Fail Pass     Negative QC Pass/Fail Pass    POCT Glucose   Result Value Ref Range    Meter Glucose 492 (H) 74 - 99 mg/dL   POCT Glucose   Result Value Ref Range    Meter Glucose 387 (H) 74 - 99 mg/dL       Imaging: All Radiology results interpreted by Radiologist unless otherwise noted. XR LUMBAR SPINE (2-3 VIEWS)   Final Result      NO ACUTE FRACTURE OR MALALIGNMENT. ED Course / Medical Decision Making     Medications   dexamethasone (PF) (DECADRON) injection 10 mg (10 mg Oral Given 8/17/20 1519)   0.9 % sodium chloride bolus (1,000 mLs Intravenous New Bag 8/17/20 1700)   HYDROcodone-acetaminophen (NORCO) 5-325 MG per tablet 1 tablet (1 tablet Oral Given 8/17/20 1759)        Re-examination:  8/17/20       Time: Patient was educated that her glucose was elevated in her urine therefore basic lab work was ordered. Patient's lab work was found to be no evidence of DKA at this point. Anion gap is 13. Patient was given a liter of fluids as well as a pain pill. 1829 patient's glucose was checked and is 387 and is lowered. Patient is asking to be discharged.   Patient states she is following up with her endocrinologist tomorrow to discuss placing her insulin pump with back    Consult(s):   None    Procedure(s):   none    Medical Decision Making/Counseling:     Patient is a 71-year-old female that presented to the emergency department with chronic lower back pain with intermittent follow up for reevaluation. The emergency provider has spoken with the patient and discussed todays emergency visit, in addition to providing specific details for the plan of care and counseling regarding the diagnosis and prognosis. She was counseled on the role of the emergency department regarding prescribing medications for chronic conditions including Narcotic and other controlled substances. Based on the presenting complaint and nature of illness, the requested medications will not be provided today in prescription form and she is instructed to contact their provider for supplemental medications as soon as possible. Questions are answered at this time and they are agreeable with the plan. Assessment      1. Acute exacerbation of chronic low back pain    2. Elevated glucose level      Plan   Discharge to home  Patient condition is stable    New Medications     New Prescriptions    CYCLOBENZAPRINE (FLEXERIL) 10 MG TABLET    Take 1 tablet by mouth nightly as needed for Muscle spasms Do not drive on this medication     Electronically signed by Bolivar Ventura PA-C   DD: 8/17/20  **This report was transcribed using voice recognition software. Every effort was made to ensure accuracy; however, inadvertent computerized transcription errors may be present.   END OF ED PROVIDER NOTE      Bolivar Ventura PA-C  08/17/20 5132

## 2020-08-17 NOTE — ED PROVIDER NOTES
[facility]     Department of Emergency Medicine   ED  Provider Note  Admit Date/RoomTime: 8/17/2020  2:30 PM  ED Room: 36/  Chief Complaint   Back Pain (lower) and Abdominal Pain (bilat lower- was seen previously for same, not better )    History of Present Illness   Source of history provided by:  patient. History/Exam Limitations: none. Karla Jenkins is a 27 y.o. old female with a prior history of recurrent self limited episodes of low back pain in the past and previous herniated disc, presents to the emergency department by private vehicle, for acute-on-chronic, aching bilateral lower lumbar spine pain without radiation, for a few week(s) prior to arrival.  The original pain was caused by unknown. There has been history of recent injury, patient states \"my blood sugar dropped low and I end up losing my balance and falling yesterday but I did not hit my head or lose consciousness. \"  Patient is not any blood thinners. Patient states that she recently had an MRI and is seeing the pain and spine [place]. Patient states that they recommended her to physical therapy for which she only did 1 session and stated that it \"hurt too much the next day so I stopped going. \"  Patient states she uses \"OxyContin for the pain and she has run out. \". Since onset the symptoms have been constant and mild in severity. She denies any of the following: bladder incontinence, bladder urgency, bowel incontinence or sacral numbness. Associated Signs & Symptoms: low back pain. The pain is aggraveated by movement and relieved by nothing. There has been no cramping, vomiting, diarrhea, fever, chills, sweats, headache, arthralgias, myalgias, irritability, URI symptoms or cough.    She {Misc; is/is not/not currently/has never been:26980} enrolled in pain management program.    {To Insert Dissection or AAA Risk Factor Table, Choose *,*,*  From Menu and type <dot> errisk and choose from list, otherwise press Enter to continue. :[id number]::\".\"}  ROS    Pertinent positives and negatives are stated within HPI, all other systems reviewed and are negative. Past Surgical History:  has a past surgical history that includes Abdomen surgery (N/A, 5/9/2019) and Bartholin gland cyst excision. Social History:  reports that she has never smoked. She has never used smokeless tobacco. She reports that she does not drink alcohol or use drugs. Family History: family history includes Diabetes in her maternal grandmother and paternal grandmother; Stroke in an other family member. Allergies: Patient has no known allergies. Physical Exam           ED Triage Vitals   BP Temp Temp Source Pulse Resp SpO2 Height Weight   08/17/20 1427 08/17/20 1401 08/17/20 1401 08/17/20 1427 08/17/20 1427 08/17/20 1427 08/17/20 1427 08/17/20 1427   (!) 127/90 97 °F (36.1 °C) Tympanic 122 18 100 % 5' 1\" (1.549 m) 95 lb (43.1 kg)      Oxygen Saturation Interpretation: {Pulse ox analysis:62365}. Constitutional:  Alert, development consistent with age. HEENT:  NC/NT. Airway patent. Neck:  Normal ROM. Supple. Respiratory:  Clear to auscultation and breath sounds equal.  CV:  Regular rate and rhythm, normal heart sounds, without pathological murmurs, ectopy, gallops, or rubs. GI:  Abdomen Soft, nontender, good bowel sounds. No firm or pulsatile mass. Back: {Desc; upper/lower:83475} {Anatomy; spine regions:11936} {Desc; right/left greater than:49942:a}. Tenderness: {Desc; none/mild/moderate/severe:768815::\"None\"}. Swelling: {YES/NO:53351::\"no\"}. Range of Motion: {Range of motion:63475}. CVA Tenderness: {YES/NO, RIGHT/LEFT:20012::\"No\"}. Straight leg raising:  {RIGHT LEFT BILATERAL CAPS GZYMQ:95507} {Desc; pos/neg straight leg raise:75025::\"negative\"}. Skin:  {Exam; skin cellulitis:13366:a:\"no erythema, rash or swelling noted\"}.   Distal Function:              Motor deficit: {NONE members:99150} and discussed todays emergency visit, in addition to providing specific details for the plan of care and counseling regarding the diagnosis and prognosis. She was counseled on the role of the emergency department regarding prescribing medications for chronic conditions including Narcotic and other controlled substances. Based on the presenting complaint and nature of illness, the requested medications {Actions; is/will/was/not:37847::\"will not be\"} provided today in prescription form and she is instructed to contact their provider for supplemental medications as soon as possible. Questions are answered at this time and they are agreeable with the plan. Assessment    No diagnosis found. Plan   {Plan; disposition:49166}  Patient condition is {condition:76105}    New Medications     New Prescriptions    No medications on file     Electronically signed by Hortensia Aguila PA-C   DD: 8/17/20  **This report was transcribed using voice recognition software. Every effort was made to ensure accuracy; however, inadvertent computerized transcription errors may be present.   END OF ED PROVIDER NOTE

## 2020-08-25 ENCOUNTER — HOSPITAL ENCOUNTER (OUTPATIENT)
Dept: PHYSICAL THERAPY | Age: 25
Setting detail: THERAPIES SERIES
Discharge: HOME | End: 2020-08-25
Payer: COMMERCIAL

## 2020-08-25 PROCEDURE — 97161 PT EVAL LOW COMPLEX 20 MIN: CPT

## 2020-08-25 NOTE — PROGRESS NOTES
Physical Therapy  Initial Assessment  Date: 2020  Patient Name: Pankaj Billy  MRN: [id number]  : 1995          Restrictions       Subjective   General  Chart Reviewed: Yes  Patient assessed for rehabilitation services?: Yes  Additional Pertinent Hx: Patient presents to PT with complaint of persistent thoraco-lumbar region pain Pain has been present for 6+ mos. No specific CA but may be related to a fall patient suffered at that time Has had a CT scan and MROI Both negative for acute injury  Family / Caregiver Present: No  Referring Practitioner: Jenny ESCUDERO  Referral Date : 20  Diagnosis: Back Pain  Follows Commands: Within Functional Limits  PT Visit Information  Onset Date: 20  PT Insurance Information: [employer]  Subjective  Subjective: Pain rated Constant Pain limited mobility and ADL's  Pain Screening  Patient Currently in Pain: Yes  Vital Signs  Patient Currently in Pain: Yes    Vision/Hearing  Vision  Vision: Within Functional Limits  Hearing  Hearing: Within functional limits    Orientation  Orientation  Overall Orientation Status: Within Functional Limits    Social/Functional History  Social/Functional History  Lives With: Family  Type of Home: House  Home Layout: One level  Homemaking Responsibilities: Yes  Active : Yes  Mode of Transportation: Car  Occupation: Unemployed    Objective     Observation/Palpation  Posture: Fair  Palpation: Pain noted with light palpation over the thoracic and lumbar paraspinal region posterior and lateral ribs bilateral  Observation: Patient is very thin with a moderate to marked thoraco-lumbar lordosis. Muscle mass is modest but well distributed and proportional with no gross muscle wasting or asymmetry noted.  Movements are well controlled and purposeful with no substitution patterns or guarding being noted    AROM RLE (degrees)  RLE AROM: WFL  AROM LLE (degrees)  LLE AROM : WFL  Spine  Lumbar: flexion limited 20% with paraspinal pain    Strength RLE  Comment: 4- to 4/5  Strength LLE  Comment: 4- to 4/5  Strength Other  Other: trunk/core 3 to 3+/5  Tone RLE  RLE Tone: Normotonic  Tone LLE  LLE Tone: Normotonic  Motor Control  Gross Motor?: WFL        Bed mobility  Bridging: Independent  Rolling to Left: Independent  Rolling to Right: Independent  Supine to Sit: Independent  Sit to Supine: Independent  Transfers  Sit to Stand: Independent  Stand to sit: Independent       Ambulation  Ambulation?: Yes  Ambulation 1  Surface: level tile  Device: No Device  Assistance: Independent  Gait Deviations: None                            Assessment   Conditions Requiring Skilled Therapeutic Intervention  Body structures, Functions, Activity limitations: Decreased functional mobility ; Increased pain;Decreased posture;Decreased strength;Decreased endurance  Prognosis: Fair  Decision Making: Low Complexity  REQUIRES PT FOLLOW UP: Yes         Plan   Plan  Times per week: 1-2  Plan weeks: 4-5  Current Treatment Recommendations: Strengthening, Neuromuscular Re-education, Home Exercise Program, Aquatics, ROM, Manual Therapy - Soft Tissue Mobilization, Endurance Training, Patient/Caregiver Education & Training, Modalities, Functional Mobility Training    G-Code       OutComes Score                                                  AM-PAC Score             Goals          Therapy Time   Individual Concurrent Group Co-treatment   Time In 1300         Time Out 1340         Minutes 40         Timed Code Treatment Minutes: 27 Minutes       Apryl Owusu, PT

## 2020-08-30 ENCOUNTER — HOSPITAL ENCOUNTER (INPATIENT)
Age: 25
LOS: 4 days | Discharge: HOME | DRG: 420 | End: 2020-09-04
Attending: INTERNAL MEDICINE | Admitting: INTERNAL MEDICINE
Payer: COMMERCIAL

## 2020-08-30 PROCEDURE — 96375 TX/PRO/DX INJ NEW DRUG ADDON: CPT

## 2020-08-30 PROCEDURE — 96376 TX/PRO/DX INJ SAME DRUG ADON: CPT

## 2020-08-30 PROCEDURE — 96374 THER/PROPH/DIAG INJ IV PUSH: CPT

## 2020-08-30 PROCEDURE — 99284 EMERGENCY DEPT VISIT MOD MDM: CPT

## 2020-08-30 PROCEDURE — 99283 EMERGENCY DEPT VISIT LOW MDM: CPT

## 2020-08-31 ENCOUNTER — APPOINTMENT (OUTPATIENT)
Dept: GENERAL RADIOLOGY | Age: 25
DRG: 420 | End: 2020-08-31
Payer: COMMERCIAL

## 2020-08-31 ENCOUNTER — APPOINTMENT (OUTPATIENT)
Dept: CT IMAGING | Age: 25
DRG: 420 | End: 2020-08-31
Payer: COMMERCIAL

## 2020-08-31 PROBLEM — E43 SEVERE PROTEIN-CALORIE MALNUTRITION (HCC): Chronic | Status: ACTIVE | Noted: 2020-08-31

## 2020-08-31 PROBLEM — E10.10 DKA, TYPE 1, NOT AT GOAL (HCC): Status: ACTIVE | Noted: 2020-08-31

## 2020-08-31 PROBLEM — E87.20 LACTIC ACID ACIDOSIS: Status: ACTIVE | Noted: 2020-08-31

## 2020-08-31 PROBLEM — R57.1 HYPOVOLEMIC SHOCK (HCC): Status: ACTIVE | Noted: 2020-08-31

## 2020-08-31 PROBLEM — E10.10 DKA, TYPE 1 (HCC): Status: ACTIVE | Noted: 2018-11-12

## 2020-08-31 LAB
ALBUMIN SERPL-MCNC: 3.9 G/DL (ref 3.5–5.2)
ALBUMIN SERPL-MCNC: 4.8 G/DL (ref 3.5–5.2)
ALP BLD-CCNC: 100 U/L (ref 35–104)
ALP BLD-CCNC: 64 U/L (ref 35–104)
ALT SERPL-CCNC: 17 U/L (ref 0–32)
ALT SERPL-CCNC: 21 U/L (ref 0–32)
ANION GAP SERPL CALCULATED.3IONS-SCNC: 12 MMOL/L (ref 7–16)
ANION GAP SERPL CALCULATED.3IONS-SCNC: 21 MMOL/L (ref 7–16)
ANION GAP SERPL CALCULATED.3IONS-SCNC: 30 MMOL/L (ref 7–16)
ANION GAP SERPL CALCULATED.3IONS-SCNC: 32 MMOL/L (ref 7–16)
ANION GAP SERPL CALCULATED.3IONS-SCNC: 9 MMOL/L (ref 7–16)
AST SERPL-CCNC: 21 U/L (ref 0–31)
AST SERPL-CCNC: 22 U/L (ref 0–31)
BACTERIA: NORMAL /HPF
BASOPHILS ABSOLUTE: 0.05 E9/L (ref 0–0.2)
BASOPHILS ABSOLUTE: 0.08 E9/L (ref 0–0.2)
BASOPHILS RELATIVE PERCENT: 0.2 % (ref 0–2)
BASOPHILS RELATIVE PERCENT: 0.3 % (ref 0–2)
BETA-HYDROXYBUTYRATE: >4.5 MMOL/L (ref 0.02–0.27)
BILIRUB SERPL-MCNC: 0.2 MG/DL (ref 0–1.2)
BILIRUB SERPL-MCNC: <0.2 MG/DL (ref 0–1.2)
BILIRUBIN DIRECT: <0.2 MG/DL (ref 0–0.3)
BILIRUBIN URINE: NEGATIVE
BILIRUBIN, INDIRECT: NORMAL MG/DL (ref 0–1)
BLOOD, URINE: NEGATIVE
BUN BLDV-MCNC: 14 MG/DL (ref 6–20)
BUN BLDV-MCNC: 17 MG/DL (ref 6–20)
BUN BLDV-MCNC: 17 MG/DL (ref 6–20)
BUN BLDV-MCNC: 8 MG/DL (ref 6–20)
BUN BLDV-MCNC: 9 MG/DL (ref 6–20)
CALCIUM SERPL-MCNC: 10.5 MG/DL (ref 8.6–10.2)
CALCIUM SERPL-MCNC: 8.7 MG/DL (ref 8.6–10.2)
CALCIUM SERPL-MCNC: 9 MG/DL (ref 8.6–10.2)
CALCIUM SERPL-MCNC: 9 MG/DL (ref 8.6–10.2)
CALCIUM SERPL-MCNC: 9.1 MG/DL (ref 8.6–10.2)
CHLORIDE BLD-SCNC: 106 MMOL/L (ref 98–107)
CHLORIDE BLD-SCNC: 108 MMOL/L (ref 98–107)
CHLORIDE BLD-SCNC: 109 MMOL/L (ref 98–107)
CHLORIDE BLD-SCNC: 114 MMOL/L (ref 98–107)
CHLORIDE BLD-SCNC: 98 MMOL/L (ref 98–107)
CLARITY: CLEAR
CO2: 14 MMOL/L (ref 22–29)
CO2: 18 MMOL/L (ref 22–29)
CO2: 3 MMOL/L (ref 22–29)
CO2: 5 MMOL/L (ref 22–29)
CO2: 7 MMOL/L (ref 22–29)
COLOR: YELLOW
CREAT SERPL-MCNC: 0.5 MG/DL (ref 0.5–1)
CREAT SERPL-MCNC: 0.6 MG/DL (ref 0.5–1)
CREAT SERPL-MCNC: 0.7 MG/DL (ref 0.5–1)
EOSINOPHILS ABSOLUTE: 0 E9/L (ref 0.05–0.5)
EOSINOPHILS ABSOLUTE: 0.01 E9/L (ref 0.05–0.5)
EOSINOPHILS RELATIVE PERCENT: 0 % (ref 0–6)
EOSINOPHILS RELATIVE PERCENT: 0 % (ref 0–6)
EPITHELIAL CELLS, UA: NORMAL /HPF
GFR AFRICAN AMERICAN: >60
GFR NON-AFRICAN AMERICAN: >60 ML/MIN/1.73
GLUCOSE BLD-MCNC: 179 MG/DL (ref 74–99)
GLUCOSE BLD-MCNC: 187 MG/DL (ref 74–99)
GLUCOSE BLD-MCNC: 268 MG/DL (ref 74–99)
GLUCOSE BLD-MCNC: 530 MG/DL (ref 74–99)
GLUCOSE BLD-MCNC: 702 MG/DL (ref 74–99)
GLUCOSE URINE: 500 MG/DL
HCG, URINE, POC: NEGATIVE
HCT VFR BLD CALC: 35.1 % (ref 34–48)
HCT VFR BLD CALC: 46.6 % (ref 34–48)
HEMOGLOBIN: 11.6 G/DL (ref 11.5–15.5)
HEMOGLOBIN: 14.6 G/DL (ref 11.5–15.5)
IMMATURE GRANULOCYTES #: 0.31 E9/L
IMMATURE GRANULOCYTES #: 0.31 E9/L
IMMATURE GRANULOCYTES %: 1.2 % (ref 0–5)
IMMATURE GRANULOCYTES %: 1.3 % (ref 0–5)
KETONES, URINE: >=80 MG/DL
LACTIC ACID: 1.3 MMOL/L (ref 0.5–2.2)
LACTIC ACID: 1.7 MMOL/L (ref 0.5–2.2)
LACTIC ACID: 5.1 MMOL/L (ref 0.5–2.2)
LEUKOCYTE ESTERASE, URINE: NEGATIVE
LIPASE: 11 U/L (ref 13–60)
LYMPHOCYTES ABSOLUTE: 4.37 E9/L (ref 1.5–4)
LYMPHOCYTES ABSOLUTE: 4.37 E9/L (ref 1.5–4)
LYMPHOCYTES RELATIVE PERCENT: 17.5 % (ref 20–42)
LYMPHOCYTES RELATIVE PERCENT: 18.4 % (ref 20–42)
Lab: NORMAL
MAGNESIUM: 1.7 MG/DL (ref 1.6–2.6)
MAGNESIUM: 1.7 MG/DL (ref 1.6–2.6)
MAGNESIUM: 1.9 MG/DL (ref 1.6–2.6)
MAGNESIUM: 2.1 MG/DL (ref 1.6–2.6)
MCH RBC QN AUTO: 28 PG (ref 26–35)
MCH RBC QN AUTO: 28.2 PG (ref 26–35)
MCHC RBC AUTO-ENTMCNC: 31.3 % (ref 32–34.5)
MCHC RBC AUTO-ENTMCNC: 33 % (ref 32–34.5)
MCV RBC AUTO: 85.4 FL (ref 80–99.9)
MCV RBC AUTO: 89.3 FL (ref 80–99.9)
METER GLUCOSE: 137 MG/DL (ref 74–99)
METER GLUCOSE: 153 MG/DL (ref 74–99)
METER GLUCOSE: 170 MG/DL (ref 74–99)
METER GLUCOSE: 172 MG/DL (ref 74–99)
METER GLUCOSE: 178 MG/DL (ref 74–99)
METER GLUCOSE: 180 MG/DL (ref 74–99)
METER GLUCOSE: 183 MG/DL (ref 74–99)
METER GLUCOSE: 189 MG/DL (ref 74–99)
METER GLUCOSE: 202 MG/DL (ref 74–99)
METER GLUCOSE: 233 MG/DL (ref 74–99)
METER GLUCOSE: 262 MG/DL (ref 74–99)
METER GLUCOSE: 315 MG/DL (ref 74–99)
METER GLUCOSE: 363 MG/DL (ref 74–99)
METER GLUCOSE: 416 MG/DL (ref 74–99)
METER GLUCOSE: >500 MG/DL (ref 74–99)
MONOCYTES ABSOLUTE: 0.5 E9/L (ref 0.1–0.95)
MONOCYTES ABSOLUTE: 1.42 E9/L (ref 0.1–0.95)
MONOCYTES RELATIVE PERCENT: 2.1 % (ref 2–12)
MONOCYTES RELATIVE PERCENT: 5.7 % (ref 2–12)
NEGATIVE QC PASS/FAIL: NORMAL
NEUTROPHILS ABSOLUTE: 18.5 E9/L (ref 1.8–7.3)
NEUTROPHILS ABSOLUTE: 18.85 E9/L (ref 1.8–7.3)
NEUTROPHILS RELATIVE PERCENT: 75.4 % (ref 43–80)
NEUTROPHILS RELATIVE PERCENT: 77.9 % (ref 43–80)
NITRITE, URINE: NEGATIVE
PDW BLD-RTO: 12.9 FL (ref 11.5–15)
PDW BLD-RTO: 13 FL (ref 11.5–15)
PH UA: 5.5 (ref 5–9)
PH VENOUS: 6.91 (ref 7.35–7.45)
PHOSPHORUS: 2.4 MG/DL (ref 2.5–4.5)
PHOSPHORUS: 2.5 MG/DL (ref 2.5–4.5)
PHOSPHORUS: 3.3 MG/DL (ref 2.5–4.5)
PHOSPHORUS: 5.7 MG/DL (ref 2.5–4.5)
PLATELET # BLD: 259 E9/L (ref 130–450)
PLATELET # BLD: 392 E9/L (ref 130–450)
PMV BLD AUTO: 11.6 FL (ref 7–12)
PMV BLD AUTO: 11.7 FL (ref 7–12)
POSITIVE QC PASS/FAIL: NORMAL
POTASSIUM SERPL-SCNC: 3.9 MMOL/L (ref 3.5–5)
POTASSIUM SERPL-SCNC: 4.2 MMOL/L (ref 3.5–5)
POTASSIUM SERPL-SCNC: 4.3 MMOL/L (ref 3.5–5)
POTASSIUM SERPL-SCNC: 4.4 MMOL/L (ref 3.5–5)
POTASSIUM SERPL-SCNC: 5.3 MMOL/L (ref 3.5–5)
PROCALCITONIN: 1.13 NG/ML (ref 0–0.08)
PROTEIN UA: ABNORMAL MG/DL
RBC # BLD: 4.11 E12/L (ref 3.5–5.5)
RBC # BLD: 5.22 E12/L (ref 3.5–5.5)
RBC UA: NORMAL /HPF (ref 0–2)
SODIUM BLD-SCNC: 133 MMOL/L (ref 132–146)
SODIUM BLD-SCNC: 135 MMOL/L (ref 132–146)
SODIUM BLD-SCNC: 137 MMOL/L (ref 132–146)
SODIUM BLD-SCNC: 140 MMOL/L (ref 132–146)
SODIUM BLD-SCNC: 141 MMOL/L (ref 132–146)
SPECIFIC GRAVITY UA: 1.02 (ref 1–1.03)
TOTAL PROTEIN: 6.8 G/DL (ref 6.4–8.3)
TOTAL PROTEIN: 9 G/DL (ref 6.4–8.3)
UROBILINOGEN, URINE: 0.2 E.U./DL
WBC # BLD: 23.8 E9/L (ref 4.5–11.5)
WBC # BLD: 25 E9/L (ref 4.5–11.5)
WBC UA: NORMAL /HPF (ref 0–5)

## 2020-08-31 PROCEDURE — 71045 X-RAY EXAM CHEST 1 VIEW: CPT

## 2020-08-31 PROCEDURE — 87081 CULTURE SCREEN ONLY: CPT

## 2020-08-31 PROCEDURE — 2580000003 HC RX 258: Performed by: PHYSICIAN ASSISTANT

## 2020-08-31 PROCEDURE — 2580000003 HC RX 258: Performed by: RADIOLOGY

## 2020-08-31 PROCEDURE — 80048 BASIC METABOLIC PNL TOTAL CA: CPT

## 2020-08-31 PROCEDURE — 82962 GLUCOSE BLOOD TEST: CPT

## 2020-08-31 PROCEDURE — 6370000000 HC RX 637 (ALT 250 FOR IP): Performed by: PHYSICIAN ASSISTANT

## 2020-08-31 PROCEDURE — 6360000002 HC RX W HCPCS: Performed by: PHYSICIAN ASSISTANT

## 2020-08-31 PROCEDURE — 6370000000 HC RX 637 (ALT 250 FOR IP): Performed by: INTERNAL MEDICINE

## 2020-08-31 PROCEDURE — 82800 BLOOD PH: CPT

## 2020-08-31 PROCEDURE — 83605 ASSAY OF LACTIC ACID: CPT

## 2020-08-31 PROCEDURE — 74177 CT ABD & PELVIS W/CONTRAST: CPT

## 2020-08-31 PROCEDURE — 82010 KETONE BODYS QUAN: CPT

## 2020-08-31 PROCEDURE — 84100 ASSAY OF PHOSPHORUS: CPT

## 2020-08-31 PROCEDURE — 87040 BLOOD CULTURE FOR BACTERIA: CPT

## 2020-08-31 PROCEDURE — 2580000003 HC RX 258: Performed by: INTERNAL MEDICINE

## 2020-08-31 PROCEDURE — 84145 PROCALCITONIN (PCT): CPT

## 2020-08-31 PROCEDURE — 2500000003 HC RX 250 WO HCPCS: Performed by: INTERNAL MEDICINE

## 2020-08-31 PROCEDURE — 85025 COMPLETE CBC W/AUTO DIFF WBC: CPT

## 2020-08-31 PROCEDURE — 81001 URINALYSIS AUTO W/SCOPE: CPT

## 2020-08-31 PROCEDURE — 2000000000 HC ICU R&B

## 2020-08-31 PROCEDURE — 83690 ASSAY OF LIPASE: CPT

## 2020-08-31 PROCEDURE — 83735 ASSAY OF MAGNESIUM: CPT

## 2020-08-31 PROCEDURE — 80076 HEPATIC FUNCTION PANEL: CPT

## 2020-08-31 PROCEDURE — 99223 1ST HOSP IP/OBS HIGH 75: CPT | Performed by: INTERNAL MEDICINE

## 2020-08-31 PROCEDURE — 2500000003 HC RX 250 WO HCPCS: Performed by: EMERGENCY MEDICINE

## 2020-08-31 PROCEDURE — 80053 COMPREHEN METABOLIC PANEL: CPT

## 2020-08-31 PROCEDURE — 36415 COLL VENOUS BLD VENIPUNCTURE: CPT

## 2020-08-31 PROCEDURE — 6360000004 HC RX CONTRAST MEDICATION: Performed by: RADIOLOGY

## 2020-08-31 PROCEDURE — 6360000002 HC RX W HCPCS: Performed by: INTERNAL MEDICINE

## 2020-08-31 PROCEDURE — 99254 IP/OBS CNSLTJ NEW/EST MOD 60: CPT | Performed by: INTERNAL MEDICINE

## 2020-08-31 RX ORDER — INSULIN GLARGINE 100 [IU]/ML
20 INJECTION, SOLUTION SUBCUTANEOUS ONCE
Status: COMPLETED | OUTPATIENT
Start: 2020-08-31 | End: 2020-08-31

## 2020-08-31 RX ORDER — DEXTROSE MONOHYDRATE 25 G/50ML
12.5 INJECTION, SOLUTION INTRAVENOUS PRN
Status: DISCONTINUED | OUTPATIENT
Start: 2020-08-31 | End: 2020-09-04 | Stop reason: SDUPTHER

## 2020-08-31 RX ORDER — MAGNESIUM SULFATE 1 G/100ML
1 INJECTION INTRAVENOUS PRN
Status: DISCONTINUED | OUTPATIENT
Start: 2020-08-31 | End: 2020-08-31

## 2020-08-31 RX ORDER — SODIUM CHLORIDE 9 MG/ML
INJECTION, SOLUTION INTRAVENOUS CONTINUOUS
Status: DISCONTINUED | OUTPATIENT
Start: 2020-08-31 | End: 2020-08-31

## 2020-08-31 RX ORDER — ONDANSETRON 2 MG/ML
4 INJECTION INTRAMUSCULAR; INTRAVENOUS ONCE
Status: COMPLETED | OUTPATIENT
Start: 2020-08-31 | End: 2020-08-31

## 2020-08-31 RX ORDER — DEXTROSE AND SODIUM CHLORIDE 5; .45 G/100ML; G/100ML
INJECTION, SOLUTION INTRAVENOUS CONTINUOUS PRN
Status: DISCONTINUED | OUTPATIENT
Start: 2020-08-31 | End: 2020-08-31

## 2020-08-31 RX ORDER — SODIUM CHLORIDE 9 MG/ML
INJECTION, SOLUTION INTRAVENOUS CONTINUOUS
Status: DISCONTINUED | OUTPATIENT
Start: 2020-08-31 | End: 2020-09-01

## 2020-08-31 RX ORDER — KETOROLAC TROMETHAMINE 30 MG/ML
15 INJECTION, SOLUTION INTRAMUSCULAR; INTRAVENOUS EVERY 6 HOURS PRN
Status: DISCONTINUED | OUTPATIENT
Start: 2020-08-31 | End: 2020-09-04 | Stop reason: HOSPADM

## 2020-08-31 RX ORDER — DEXTROSE MONOHYDRATE 50 MG/ML
100 INJECTION, SOLUTION INTRAVENOUS PRN
Status: DISCONTINUED | OUTPATIENT
Start: 2020-08-31 | End: 2020-09-04 | Stop reason: HOSPADM

## 2020-08-31 RX ORDER — ACETAMINOPHEN 325 MG/1
650 TABLET ORAL EVERY 6 HOURS PRN
Status: DISCONTINUED | OUTPATIENT
Start: 2020-08-31 | End: 2020-09-04 | Stop reason: HOSPADM

## 2020-08-31 RX ORDER — ACETAMINOPHEN 650 MG/1
650 SUPPOSITORY RECTAL EVERY 6 HOURS PRN
Status: DISCONTINUED | OUTPATIENT
Start: 2020-08-31 | End: 2020-09-04 | Stop reason: HOSPADM

## 2020-08-31 RX ORDER — DEXTROSE MONOHYDRATE 25 G/50ML
12.5 INJECTION, SOLUTION INTRAVENOUS PRN
Status: DISCONTINUED | OUTPATIENT
Start: 2020-08-31 | End: 2020-09-04 | Stop reason: HOSPADM

## 2020-08-31 RX ORDER — PROMETHAZINE HYDROCHLORIDE 25 MG/1
12.5 TABLET ORAL EVERY 6 HOURS PRN
Status: DISCONTINUED | OUTPATIENT
Start: 2020-08-31 | End: 2020-09-04 | Stop reason: HOSPADM

## 2020-08-31 RX ORDER — MORPHINE SULFATE 4 MG/ML
4 INJECTION, SOLUTION INTRAMUSCULAR; INTRAVENOUS ONCE
Status: COMPLETED | OUTPATIENT
Start: 2020-08-31 | End: 2020-08-31

## 2020-08-31 RX ORDER — ONDANSETRON 2 MG/ML
4 INJECTION INTRAMUSCULAR; INTRAVENOUS EVERY 6 HOURS PRN
Status: DISCONTINUED | OUTPATIENT
Start: 2020-08-31 | End: 2020-09-04 | Stop reason: HOSPADM

## 2020-08-31 RX ORDER — FENTANYL CITRATE 50 UG/ML
25 INJECTION, SOLUTION INTRAMUSCULAR; INTRAVENOUS ONCE
Status: COMPLETED | OUTPATIENT
Start: 2020-08-31 | End: 2020-08-31

## 2020-08-31 RX ORDER — DEXTROSE, SODIUM CHLORIDE, AND POTASSIUM CHLORIDE 5; .45; .15 G/100ML; G/100ML; G/100ML
INJECTION INTRAVENOUS CONTINUOUS PRN
Status: DISCONTINUED | OUTPATIENT
Start: 2020-08-31 | End: 2020-09-01

## 2020-08-31 RX ORDER — 0.9 % SODIUM CHLORIDE 0.9 %
1000 INTRAVENOUS SOLUTION INTRAVENOUS ONCE
Status: COMPLETED | OUTPATIENT
Start: 2020-08-31 | End: 2020-08-31

## 2020-08-31 RX ORDER — INSULIN GLARGINE 100 [IU]/ML
20 INJECTION, SOLUTION SUBCUTANEOUS NIGHTLY
Status: DISCONTINUED | OUTPATIENT
Start: 2020-09-01 | End: 2020-09-02

## 2020-08-31 RX ORDER — POTASSIUM CHLORIDE 7.45 MG/ML
10 INJECTION INTRAVENOUS PRN
Status: DISCONTINUED | OUTPATIENT
Start: 2020-08-31 | End: 2020-08-31

## 2020-08-31 RX ORDER — POTASSIUM CHLORIDE 20 MEQ/1
40 TABLET, EXTENDED RELEASE ORAL PRN
Status: DISCONTINUED | OUTPATIENT
Start: 2020-08-31 | End: 2020-08-31

## 2020-08-31 RX ORDER — SODIUM CHLORIDE 0.9 % (FLUSH) 0.9 %
10 SYRINGE (ML) INJECTION PRN
Status: DISCONTINUED | OUTPATIENT
Start: 2020-08-31 | End: 2020-09-04 | Stop reason: HOSPADM

## 2020-08-31 RX ORDER — SODIUM CHLORIDE 0.9 % (FLUSH) 0.9 %
10 SYRINGE (ML) INJECTION EVERY 12 HOURS SCHEDULED
Status: DISCONTINUED | OUTPATIENT
Start: 2020-08-31 | End: 2020-09-04 | Stop reason: HOSPADM

## 2020-08-31 RX ORDER — NICOTINE POLACRILEX 4 MG
15 LOZENGE BUCCAL PRN
Status: DISCONTINUED | OUTPATIENT
Start: 2020-08-31 | End: 2020-09-04 | Stop reason: HOSPADM

## 2020-08-31 RX ORDER — SODIUM CHLORIDE 0.9 % (FLUSH) 0.9 %
10 SYRINGE (ML) INJECTION ONCE
Status: COMPLETED | OUTPATIENT
Start: 2020-08-31 | End: 2020-08-31

## 2020-08-31 RX ADMIN — POTASSIUM CHLORIDE 10 MEQ: 7.46 INJECTION, SOLUTION INTRAVENOUS at 08:21

## 2020-08-31 RX ADMIN — MORPHINE SULFATE 4 MG: 4 INJECTION, SOLUTION INTRAMUSCULAR; INTRAVENOUS at 00:27

## 2020-08-31 RX ADMIN — POTASSIUM BICARBONATE 20 MEQ: 782 TABLET, EFFERVESCENT ORAL at 13:24

## 2020-08-31 RX ADMIN — POTASSIUM BICARBONATE 20 MEQ: 782 TABLET, EFFERVESCENT ORAL at 09:40

## 2020-08-31 RX ADMIN — SODIUM CHLORIDE 1000 ML: 9 INJECTION, SOLUTION INTRAVENOUS at 00:27

## 2020-08-31 RX ADMIN — SODIUM PHOSPHATE, MONOBASIC, MONOHYDRATE 10 MMOL: 276; 142 INJECTION, SOLUTION INTRAVENOUS at 13:24

## 2020-08-31 RX ADMIN — SODIUM BICARBONATE 100 MEQ: 84 INJECTION, SOLUTION INTRAVENOUS at 02:04

## 2020-08-31 RX ADMIN — SODIUM CHLORIDE 1000 ML: 9 INJECTION, SOLUTION INTRAVENOUS at 01:31

## 2020-08-31 RX ADMIN — FENTANYL CITRATE 25 MCG: 50 INJECTION, SOLUTION INTRAMUSCULAR; INTRAVENOUS at 06:10

## 2020-08-31 RX ADMIN — ONDANSETRON 4 MG: 2 INJECTION INTRAMUSCULAR; INTRAVENOUS at 00:27

## 2020-08-31 RX ADMIN — KETOROLAC TROMETHAMINE 15 MG: 30 INJECTION, SOLUTION INTRAMUSCULAR at 12:02

## 2020-08-31 RX ADMIN — SODIUM CHLORIDE 0.1 UNITS/KG/HR: 9 INJECTION, SOLUTION INTRAVENOUS at 01:31

## 2020-08-31 RX ADMIN — INSULIN LISPRO 1 UNITS: 100 INJECTION, SOLUTION INTRAVENOUS; SUBCUTANEOUS at 21:05

## 2020-08-31 RX ADMIN — Medication 10 ML: at 01:18

## 2020-08-31 RX ADMIN — INSULIN GLARGINE 20 UNITS: 100 INJECTION, SOLUTION SUBCUTANEOUS at 17:08

## 2020-08-31 RX ADMIN — POTASSIUM CHLORIDE, DEXTROSE MONOHYDRATE AND SODIUM CHLORIDE: 150; 5; 450 INJECTION, SOLUTION INTRAVENOUS at 08:17

## 2020-08-31 RX ADMIN — POTASSIUM BICARBONATE 20 MEQ: 782 TABLET, EFFERVESCENT ORAL at 09:39

## 2020-08-31 RX ADMIN — POTASSIUM CHLORIDE, DEXTROSE MONOHYDRATE AND SODIUM CHLORIDE: 150; 5; 450 INJECTION, SOLUTION INTRAVENOUS at 14:40

## 2020-08-31 RX ADMIN — KETOROLAC TROMETHAMINE 15 MG: 30 INJECTION, SOLUTION INTRAMUSCULAR at 18:35

## 2020-08-31 RX ADMIN — Medication 10 ML: at 21:00

## 2020-08-31 RX ADMIN — INSULIN HUMAN 4 UNITS: 100 INJECTION, SOLUTION PARENTERAL at 01:41

## 2020-08-31 RX ADMIN — IOPAMIDOL 110 ML: 755 INJECTION, SOLUTION INTRAVENOUS at 01:18

## 2020-08-31 RX ADMIN — Medication 10 ML: at 10:01

## 2020-08-31 ASSESSMENT — PAIN SCALES - GENERAL
PAINLEVEL_OUTOF10: 7
PAINLEVEL_OUTOF10: 0
PAINLEVEL_OUTOF10: 7
PAINLEVEL_OUTOF10: 6
PAINLEVEL_OUTOF10: 7
PAINLEVEL_OUTOF10: 0
PAINLEVEL_OUTOF10: 10
PAINLEVEL_OUTOF10: 9
PAINLEVEL_OUTOF10: 6
PAINLEVEL_OUTOF10: 0
PAINLEVEL_OUTOF10: 10

## 2020-08-31 ASSESSMENT — PAIN DESCRIPTION - PAIN TYPE
TYPE: ACUTE PAIN

## 2020-08-31 ASSESSMENT — PAIN DESCRIPTION - LOCATION
LOCATION: ABDOMEN
LOCATION: ABDOMEN;BACK
LOCATION: ABDOMEN
LOCATION: BACK

## 2020-08-31 ASSESSMENT — PAIN DESCRIPTION - FREQUENCY
FREQUENCY: INTERMITTENT

## 2020-08-31 ASSESSMENT — PAIN DESCRIPTION - ORIENTATION
ORIENTATION: LOWER

## 2020-08-31 ASSESSMENT — PAIN DESCRIPTION - ONSET
ONSET: AWAKENED FROM SLEEP
ONSET: AWAKENED FROM SLEEP

## 2020-08-31 ASSESSMENT — PAIN DESCRIPTION - PROGRESSION: CLINICAL_PROGRESSION: GRADUALLY IMPROVING

## 2020-08-31 NOTE — PROGRESS NOTES
Full ID Consult to follow    Pt seen and examined    Leucocytosis without any signs of infection for now  Continue to watch off abx  Follow bld cultures  Chronic abdo pain - ?gastroparesis    Will follow her closely

## 2020-08-31 NOTE — PROGRESS NOTES
8/31/2020  11:07 AM      Comprehensive Nutrition Assessment    Type and Reason for Visit:  Initial, Consult(Diabetic Diet)    Nutrition Recommendations/Plan: Continue to ADAT to CHO controlled diet and will add ONS at that time (HP Ensure)    Nutrition Assessment:  Pt severely malnourished with wt loss and wasting, likely relative to uncontrolled DM (Hgb A1c 13-15.9% over last year). Pt has had abd pain and N/V and missed insulin PTA. Hx gastroparesis may contribute to compromise. Will follow to reinforce DM diet, when diet adv. Note per CM that pt declining Diabetes Ed classes at this time. Malnutrition Assessment:  Malnutrition Status:  Severe malnutrition    Context:  Chronic Illness     Findings of the 6 clinical characteristics of malnutrition:  Energy Intake:  7 - 75% or less estimated energy requirements for 1 month or longer  Weight Loss:  7 - Greater than 5% over 1 month     Body Fat Loss:  7 - Severe body fat loss Triceps   Muscle Mass Loss:  7 - Severe muscle mass loss Clavicles (pectoralis & deltoids)  Fluid Accumulation:  No significant fluid accumulation     Strength:  Not Performed    Estimated Daily Nutrient Needs:  Energy (kcal):  ; Weight Used for Energy Requirements:  Current     Protein (g):  50-60 (1.3-1.5 g/kg); Weight Used for Protein Requirements:  Current        Fluid (ml/day):   (1 ml/gene); Weight Used for Fluid Requirements:  Current      Nutrition Related Findings:  underwt, N/V, soft abdomen w/pain, +BS, no edema, I/O WNL, AG 21      Wounds:  None       Current Nutrition Therapies:    DIET CLEAR LIQUID;     Anthropometric Measures:  · Height: 5' 1\" (154.9 cm)  · Current Body Weight: 80 lb (36.3 kg)(8/31 bedwt)   · Usual Body Weight: 91 lb (41.3 kg)(per EMR x 1 mo; 106# in May 2020)     · Ideal Body Weight: 105 lbs; % Ideal Body Weight     · BMI: 15.1  · BMI Categories: Underweight (BMI less than 18.5)       Nutrition Diagnosis:   · Severe malnutrition, In context of chronic illness related to endocrine dysfuntion(uncontrolled DM with complication of gastroparesis and DKA causing inadequate PO) as evidenced by intake 0-25%, poor intake prior to admission, BMI, weight loss greater than or equal to 5% in 1 month, severe muscle loss, severe loss of subcutaneous fat(12% loss in last month (25% loss since May 2020))      Nutrition Interventions:   Food and/or Nutrient Delivery:  Modify Current Diet(ADAT as abran)  Nutrition Education/Counseling:  Education needed(when diet adv, will follow to reinforce CHO controlled diet)   Coordination of Nutrition Care:  Continued Inpatient Monitoring    Goals:  Nutrition progression       Nutrition Monitoring and Evaluation:   Food/Nutrient Intake Outcomes:  Diet Advancement/Tolerance  Physical Signs/Symptoms Outcomes:  Biochemical Data, Fluid Status or Edema, Nausea or Vomiting, Nutrition Focused Physical Findings, Skin, Weight     Discharge Planning:     Too soon to determine     Electronically signed by Kenneth Bell RD, CNSC, LD on 8/31/20 at 11:07 AM EDT    Contact: [phone]

## 2020-08-31 NOTE — PATIENT CARE CONFERENCE
Intensive Care Daily Quality Rounding Checklist      ICU Team Members: bedside nurse, charge nurse, clinical pharmacist,     ICU Day #: 1    Intubation Date: NA    Ventilator Day #: NA    Central Line Insertion Date: NA  Day #:      Arterial Line Insertion Date:       Day #:     DVT Prophylaxis:    GI Prophylaxis: lovenox    Bey Catheter Insertion Date:        Day #:       Continued need (if yes, reason documented and discussed with physician):     Skin Issues/ Wounds and ordered treatment discussed on rounds: no wounds    Goals/ Plans for the Day: monitor labs, replace electrolytes, wean insulin,clear liquid diet

## 2020-08-31 NOTE — ED NOTES
Bed: 21  Expected date:   Expected time:   Means of arrival:   Comments:  EMS     Tulio Lott RN  08/30/20 0696

## 2020-08-31 NOTE — CARE COORDINATION
Known from previous admission. Lives w/ grandmother. Independent PTA. Endocrinologist is Dr. Karina Lawton. PCP is Dr. Bre Sapp. Has insulin pump. States she has a glucometer and all other diabetic testing supplies. Declining diabetic education classes. Per pt, plan is to return home on discharge- states no needs.   Will follow  Pravin Ralph

## 2020-08-31 NOTE — CONSULTS
Critical Care Admit/Consult Note         Patient - Ame Gotti   MRN -  [id number]   [place] # - [de-identified]   - 1995      Date of Admission -  2020 11:57 PM  Date of evaluation -  2020  0213/0213-A   Hospital Day - 0            ADMIT/CONSULT DETAILS     Reason for Admit/Consult   DKA    Consulting Bhavani Osborn MD  Primary Care Physician - Derryl Snellen, DO HPI   The patient is a 27 y.o. female well-known to our service. She has had numerous admissions with DKA. She has type 1 diabetes. She was recently started on an insulin pump. And apparently missed her follow-up appointment with her endocrinologist Dr. Marylin Melchor. She presented to the emergency room department last night with abdominal pain back pain nausea and vomiting. She apparently has had chronic abdominal pain for 1 month. Initial labs in the ER revealed a blood sugar of 702, anion gap of 32 with a bicarb of 3. She was given IV fluids and started on an insulin drip per DKA protocol. CT of the abdomen and pelvis was done which basically was normal there was no acute inflammatory changes no free intraperitoneal air no ascites or bowel obstruction. No obstructive uropathy. No dilatation of the mesentery of the systems. An unremarkable appearance of the appendix. Patient had a chest x-ray which was also clear. UA was also clear.     Past Medical History         Diagnosis Date    Bleeding at insertion site 2018    Common femoral artery injury, right, initial encounter 2018    DM type 1 (diabetes mellitus, type 1) ([facility] [address]         Past Surgical History           Procedure Laterality Date    ABDOMEN SURGERY N/A 2019    INCISION AND DRAINAGE OF SUPRAPUBIC ABSESS performed by Kemal Coronado MD at [address]      last yr           Current Medications   Current Medications    sodium chloride flush  10 mL Intravenous 2 times per day    enoxaparin 40 mg Subcutaneous Daily     glucose, dextrose, glucagon (rDNA), dextrose, sodium chloride flush, acetaminophen **OR** acetaminophen, magnesium hydroxide, promethazine **OR** ondansetron, dextrose, potassium chloride, magnesium sulfate, sodium phosphate IVPB **OR** sodium phosphate IVPB **OR** sodium phosphate IVPB, dextrose 5% and 0.45% NaCl with KCl 20 mEq, potassium bicarb-citric acid, ketorolac  IV Drips/Infusions   dextrose      insulin 3.3 Units/hr (08/31/20 1123)    dextrose 5% and 0.45% NaCl with KCl 20 mEq 150 mL/hr at 08/31/20 0817    sodium chloride Stopped (08/31/20 0824)     Home Medications  Medications Prior to Admission: insulin aspart (NOVOLOG) 100 UNIT/ML injection vial, Inject into the skin 3 times daily (before meals) PER PUMP  blood glucose test strips (CONTOUR NEXT TEST) strip, Neelima Contour test strips. Checks 4 times/day before meals and at bedtime and as needed for symptoms of irregular blood glucose  doxepin (ZONALON) 5 % cream, APPLY ONE GRAM (ONE PUMP) EXTERNALLY THREE TIMES A DAY TO PAIN AREA TO LOWER BACK   Gel Base (VERSAPRO) GEL, APPLY ONE GRAM (ONE PUMP) EXTERNALLY FOUR TIMES A DAY TO LOWER BACK   Insulin Pump - Insulin regular, Inject 1 Units/hr into the skin continuous Insulin-to-Carb Ratio (ICR): Insulin Sensitivity Factor (ISF): mg/dL per unit of insulin Target Blood Glucose: mg/dL Bolus Frequency:    Pt's dosing ranges, pt follows with Dr. Taina Bowers   Novolog pump  acetone, urine, test strip, Use daily as directed if bs >250 x2 or illness    Diet/Nutrition   DIET CLEAR LIQUID; Allergies   Patient has no known allergies. Social History   Tobacco   reports that she has never smoked. She has never used smokeless tobacco.    Alcohol     reports no history of alcohol use.     Occupational history :    Family History         Problem Relation Age of Onset    Diabetes Maternal Grandmother     Diabetes Paternal Grandmother     Stroke Other     Thyroid Disease Neg Hx Sleep History   n/a    ROS     REVIEW OF SYSTEMS:    Constitutional: Denies fever, weight loss, night sweats, and fatigue  Skin: Denies pigmentation, dark lesions, and rashes   HEENT: Denies hearing loss, tinnitus, ear drainage, epistaxis, sore throat, and hoarseness. Cardiovascular: Denies palpitations, chest pain, and chest pressure. Respiratory: Denies cough, dyspnea at rest, hemoptysis, apnea, and choking. Gastrointestinal: Letter for nausea and vomiting and abdominal pain   genitourinary: Denies dysuria, frequency, urgency or hematuria  Musculoskeletal: Denies myalgias, muscle weakness, and bone pain  Neurological: Denies dizziness, vertigo, headache, and focal weakness  Psychological: Denies anxiety and depression  Endocrine: Denies heat intolerance and cold intolerance  Hematopoietic/Lymphatic: Denies bleeding problems and blood transfusions    Lines and Devices   Patient has peripheral lines    Mechanical Ventilation Data   VENT SETTINGS (Comprehensive)  Vent Information  SpO2: 100 %  Additional Respiratory  Assessments  Pulse: 128  Resp: 16  SpO2: 100 %    ABG  Lab Results   Component Value Date    PH 7.395 2020    PCO2 37.7 2020    PO2 50.7 2020    HCO3 22.6 2020    O2SAT 82.6 2020     Lab Results   Component Value Date    MODE RA 2020           Vitals    height is 5' 1\" (1.549 m) and weight is 80 lb 11 oz (36.6 kg). Her oral temperature is 98.2 °F (36.8 °C). Her blood pressure is 120/66 and her pulse is 128. Her respiration is 16 and oxygen saturation is 100%.        Temperature Range: Temp: 98.2 °F (36.8 °C) Temp  Av.8 °F (36.6 °C)  Min: 97.2 °F (36.2 °C)  Max: 98.2 °F (36.8 °C)  BP Range:  Systolic (04NSJ), YW , Min:87 , ESY:705     Diastolic (23YXF), YSI:94, Min:46, Max:81    Pulse Range: Pulse  Av.6  Min: 107  Max: 131  Respiration Range: Resp  Av.3  Min: 14  Max: 31  Current Pulse Ox[de-identified]  SpO2: 100 %  24HR Pulse Ox Range:  SpO2  Av.4 %  Min: 96 %  Max: 100 %  Oxygen Amount and Delivery:        I/O (24 Hours)    Patient Vitals for the past 8 hrs:   BP Temp Temp src Pulse Resp SpO2   08/31/20 1100 120/66 98.2 °F (36.8 °C) Oral 128 16 100 %   08/31/20 1000 132/73 -- -- 124 14 100 %   08/31/20 0900 121/74 -- -- 121 15 100 %   08/31/20 0800 131/77 98 °F (36.7 °C) Oral 115 14 100 %   08/31/20 0600 114/80 -- -- 108 19 100 %   08/31/20 0500 111/67 -- -- 107 (!) 31 100 %       Intake/Output Summary (Last 24 hours) at 8/31/2020 1211  Last data filed at 8/31/2020 0555  Gross per 24 hour   Intake 1000 ml   Output 850 ml   Net 150 ml     I/O last 3 completed shifts: In: 1000 [IV Piggyback:1000]  Out: 850 [Urine:850]   Date 08/31/20 0000 - 08/31/20 2359   Shift 9287-3093 4915-0569 6545-7867 24 Hour Total   INTAKE   IV Piggyback(mL/kg) 1000(27.3)   1000(27.3)   Shift Total(mL/kg) 1000(27.3)   1000(27.3)   OUTPUT   Urine(mL/kg/hr) 850(2.9)   850   Shift Total(mL/kg) 850(23.2)   850(23.2)   Weight (kg) 36.6 36.6 36.6 36.6     Patient Vitals for the past 96 hrs (Last 3 readings):   Weight   08/31/20 0400 80 lb 11 oz (36.6 kg)   08/31/20 0006 95 lb (43.1 kg)         Drains/Tubes Outputs    Exam         PHYSICAL EXAM:  CONSTITUTIONAL:  awake, alert, cooperative, no apparent distress, and appears stated age  [de-identified]: AT, NC, ELMER, EOMI  LUNGS:  No increased work of breathing, good air exchange, clear to auscultation bilaterally, no crackles or wheezing  CARDIOVASCULAR:  Normal apical impulse, regular rate and rhythm, normal S1 and S2, no S3 or S4, and no murmur noted  ABDOMEN:  No scars, normal bowel sounds, soft, non-distended, non-tender, no masses palpated, no hepatosplenomegally  MUSCULOSKELETAL:  -C/C/E, +PP  NEUROLOGIC:  Awake, alert, oriented to name, place and time. Cranial nerves II-XII are grossly intact. Motor is 5 out of 5 bilaterally. Cerebellar finger to nose, heel to shin intact. Sensory is intact.   Babinski down going, Romberg negative, and gait is normal.  SKIN:  no bruising or bleeding and normal skin color, texture, turgor    Data   Old records and images have been reviewed    Lab Results   CBC     Lab Results   Component Value Date    WBC 25.0 08/31/2020    RBC 4.11 08/31/2020    HGB 11.6 08/31/2020    HCT 35.1 08/31/2020     08/31/2020    MCV 85.4 08/31/2020    MCH 28.2 08/31/2020    MCHC 33.0 08/31/2020    RDW 13.0 08/31/2020    NRBC 0.0 05/10/2019    SEGSPCT 58 09/29/2013    METASPCT 1.0 04/29/2020    LYMPHOPCT 17.5 08/31/2020    MONOPCT 5.7 08/31/2020    MYELOPCT 1.0 04/29/2020    BASOPCT 0.2 08/31/2020    MONOSABS 1.42 08/31/2020    LYMPHSABS 4.37 08/31/2020    EOSABS 0.00 08/31/2020    BASOSABS 0.05 08/31/2020       BMP   Lab Results   Component Value Date     08/31/2020    K 4.3 08/31/2020    K 4.7 07/12/2020     08/31/2020    CO2 14 08/31/2020    BUN 9 08/31/2020    CREATININE 0.5 08/31/2020    GLUCOSE 179 08/31/2020    CALCIUM 9.0 08/31/2020       LFTS  Lab Results   Component Value Date    ALKPHOS 64 08/31/2020    ALT 17 08/31/2020    AST 22 08/31/2020    PROT 6.8 08/31/2020    BILITOT <0.2 08/31/2020    BILIDIR <0.2 08/31/2020    IBILI see below 08/31/2020    LABALBU 3.9 08/31/2020       INR  No results for input(s): PROTIME, INR in the last 72 hours. APTT  No results for input(s): APTT in the last 72 hours. Lactic Acid  Lab Results   Component Value Date    LACTA 1.3 08/31/2020    LACTA 1.7 08/31/2020    LACTA 5.1 08/31/2020        BNP   No results for input(s): BNP in the last 72 hours. Cultures     No results for input(s): BC in the last 72 hours. No results for input(s): Zulema Hipps in the last 72 hours. No results for input(s): LABURIN in the last 72 hours. Radiology   CXR            SYSTEMS ASSESSMENT    Neuro     Awake alert oriented x3 no acute issues    Respiratory     Chest x-ray is clear.   No infiltrates  Patient is on room air      Cardiovascular     Normal sinus rhythm hemodynamically stable    Gastrointestinal     Patient with chronic abdominal pain. Possible gastroparesis given her history. Will check amylase and lipase. LFTs so far normal    Renal     Normal BUN and creatinine. Good urine output. Infectious Disease     Leukocytosis, lymphocytic predominant, no signs of active infection. Check a procalcitonin. We will consult ID. Hematology/Oncology     H&H is stable    Endocrine   DKA  Type 1 diabetes    Continue DKA protocol to gap is closed. Consult Dr. Jamaal Hughes. Once gap closed probably can resume her pump. Social/Spiritual/DNR/Other     Patient is a full code. Even patient numerous admissions at least once every month for DKA would recommend to involve  to provide patient with home health care. MECRED    \"Thank you for asking us to see this complex patient. \"    Total critical care time caring for this patient with life threatening, unstable organ failure, including direct patient contact, management of life support systems, review of data including imaging and labs, discussions with other team members and physicians at least 45'  Min so far today, excluding procedures. Please feel free to call with questions or concerns.          Adan Marrero MD

## 2020-08-31 NOTE — CONSULTS
treated with amoxicillin for 10 days. She had vaginal discharge and dysuria at that time. · Seen in May 2019 for infection in suprapubic area, cellulitis growing MSSA treated with doxycycline  Past Surgical History:   has a past surgical history that includes Abdomen surgery (N/A, 5/9/2019) and Bartholin gland cyst excision. Home Medications:    Prior to Admission medications    Medication Sig Start Date End Date Taking? Authorizing Provider   insulin aspart (NOVOLOG) 100 UNIT/ML injection vial Inject into the skin 3 times daily (before meals) PER PUMP    Historical Provider, MD   blood glucose test strips (CONTOUR NEXT TEST) strip Neelima Contour test strips. Checks 4 times/day before meals and at bedtime and as needed for symptoms of irregular blood glucose 8/12/20   Manuel Nichols MD   doxepin (ZONALON) 5 % cream APPLY ONE GRAM (ONE PUMP) EXTERNALLY THREE TIMES A DAY TO PAIN AREA TO LOWER BACK  8/10/20   KENA Rose   Gel Base [facility]) GEL APPLY ONE GRAM (ONE PUMP) EXTERNALLY FOUR TIMES A DAY TO LOWER BACK  8/10/20   KENA Rose   Insulin Pump - Insulin regular Inject 1 Units/hr into the skin continuous Insulin-to-Carb Ratio (ICR): Insulin Sensitivity Factor (ISF): mg/dL per unit of insulin  Target Blood Glucose: mg/dL  Bolus Frequency:        Pt's dosing ranges, pt follows with Dr. Virgilio Vanessa MD   acetone, urine, test strip Use daily as directed if bs >250 x2 or illness 5/7/20   Laure Grade, APRN - CNS       Allergies:  Patient has no known allergies. Social History:   reports that she has never smoked. She has never used smokeless tobacco. She reports that she does not drink alcohol or use drugs. Family History: family history includes Diabetes in her maternal grandmother and paternal grandmother; Stroke in an other family member.    REVIEW OF SYSTEMS:    12 point ROS has been done and pertinent neg and positive has been included in HPI and rest are non contributory        PHYSICAL EXAM:    /72   Pulse 127   Temp 98.2 °F (36.8 °C) (Oral)   Resp (!) 40   Ht 5' 1\" (1.549 m)   Wt 80 lb 11 oz (36.6 kg)   SpO2 100%   BMI 15.25 kg/m²    VENT SETTINGS:   Vent Information  SpO2: 100 %    General appearance: Alert, Awake, Oriented times 3, no distress  Skin: Warm and dry  Eyes: Pink palpebral conjunctivae. PERRL  Ears: External ears normal.  Nose/Sinuses: Nares normal. Septum midline. Mucosa normal. No sinus tenderness. Oropharynx: Oropharynx clear with no exudates seen  Neck: Neck supple. No jugular venous distension, lymphadenopathy or thyromegaly Trachea midline  Lungs: Lungs clear to auscultation bilaterally. No rhonchi, crackles or wheezes  Heart: S1 S2  Regular rate and rhythm. No rub, murmur or gallop  Abdomen: Abdomen soft, tender to deep palpation, no guarding or rigidity  Extremities: No edema, Peripheral pulses palpable  Musculoskeletal: Muscular strength appears intact. No joint effusion, tenderness, swelling or warmth      DATA:    Labs:     Last 3 CBC:  Recent Labs     08/31/20  0019 08/31/20  0645   WBC 23.8* 25.0*   RBC 5.22 4.11   HGB 14.6 11.6    259   MPV 11.7 11.6       Last 3 BMP  Recent Labs     08/31/20  0645 08/31/20  1124 08/31/20  1600    140 135   K 3.9 4.3 4.2   * 114* 108*   CO2 7* 14* 18*   BUN 14 9 8   CREATININE 0.5 0.5 0.5   GLUCOSE 268* 179* 187*   CALCIUM 8.7 9.0 9.0       LIVER PROFILE:  Recent Labs     08/31/20  0019 08/31/20  0645   AST 21 22   ALT 21 17   LABALBU 4.8 3.9       U   Microbiology :  No results for input(s): BC in the last 72 hours. No results for input(s): [place] in the last 72 hours. No results for input(s): LABURIN in the last 72 hours. No results for input(s): CULTRESP in the last 72 hours. No results for input(s): WNDABS in the last 72 hours. Radiology :  XR CHEST PORTABLE   Final Result   No acute cardiopulmonary process.       CT ABDOMEN PELVIS W IV CONTRAST Additional Contrast? None   Final Result   No acute inflammatory changes omental mesenteric fat planes, free   intraperitoneal air, ascites or bowel obstruction. No obstructive uropathy. No dilatation of the mesentery of the system. There is unremarkable appearance for the appendix.               Assessment and Plan:      · Leukocytosis-currently looks like mostly reactive from nausea vomiting  · Chronic abdominal pain-normal lipase level, CT abdomen unremarkable, questionable gastroparesis  · Uncontrolled diabetes/DKA  · History of trichomoniasis    Plan  -Continue to watch her off antibiotics and rest of the DKA management as per the ICU team  -Follow blood cultures  -We will follow her closely    Thank you for your consult, please call for any qs                  Rico Campbell MD

## 2020-08-31 NOTE — PROGRESS NOTES
Limited Brief communication-       1. DKA, type I-  Patient noncompliant, missed her appointemen twith Dr Afshan Rodriguez, was recently started on insulin pump for her Uncontrolled diabetes. Patient numerous admissions at least once every month for DKA   Initial labs in the ER revealed a blood sugar of 702, anion gap of 32 with a bicarb of 3. She was given IV fluids and started on an insulin drip per DKA protocol. Given her severe acidosis was given 100 mEq of sodium bicarb. · Continue DKA protocol  · Monitor electrolytes  · IV fluid hydration  · Endo was consulted     2. Severe dehydration    3. SIRS- Leukocytosis, Lactic acidosis, Tachypnea, Tacyhcardia,  ID  was consulted for possible ongoing infection, source unknown. CT of the abdomen and pelvis was done which basically was normal there was no acute inflammatory changes no free intraperitoneal air no ascites or bowel obstruction. No obstructive uropathy. No dilatation of the mesentery of the systems. An unremarkable appearance of the appendix. Patient had a chest x-ray which was also clear. UA was also clear. Follow bld cultures, procal elevated,  watch off abxs for now. 4. Chronic abdo pain x 1 month -  Possible gastroparesis, lipase, amylase pending.      Code Status: Full   DVT prophylaxis: Lovenox   Diet: clear liquid diet     - [facility], patient has numerous admissions at least once every month for DKA.

## 2020-08-31 NOTE — CONSULTS
170* 80* 153* 80* 172* 80*       Past Medical History   Past Medical History:   Diagnosis Date    Bleeding at insertion site 1/5/2018    Common femoral artery injury, right, initial encounter 1/5/2018    DM type 1 (diabetes mellitus, type 1) (HCC)      Past Surgical History   Past Surgical History:   Procedure Laterality Date    ABDOMEN SURGERY N/A 5/9/2019    INCISION AND DRAINAGE OF SUPRAPUBIC ABSESS performed by Alexandra Germain MD at [address] [place] [address]      last yr     Social history   Tobacco:   reports that she has never smoked. She has never used smokeless tobacco.  Alcol:   reports no history of alcohol use. Illicit Drugs:   reports no history of drug use.     Family history   Family History   Problem Relation Age of Onset    Diabetes Maternal Grandmother     Diabetes Paternal Grandmother     Stroke Other     Thyroid Disease Neg Hx      Allergies and Drug reactions  No Known Allergies    Scheduled Meds:   sodium chloride flush  10 mL Intravenous 2 times per day    enoxaparin  40 mg Subcutaneous Daily    insulin lispro  0-6 Units Subcutaneous TID WC    insulin lispro  0-3 Units Subcutaneous Nightly     PRN Meds:   glucose, 15 g, PRN  dextrose, 12.5 g, PRN  glucagon (rDNA), 1 mg, PRN  dextrose, 100 mL/hr, PRN  sodium chloride flush, 10 mL, PRN  acetaminophen, 650 mg, Q6H PRN    Or  acetaminophen, 650 mg, Q6H PRN  magnesium hydroxide, 30 mL, Daily PRN  promethazine, 12.5 mg, Q6H PRN    Or  ondansetron, 4 mg, Q6H PRN  dextrose, 12.5 g, PRN  dextrose 5% and 0.45% NaCl with KCl 20 mEq, , Continuous PRN  ketorolac, 15 mg, Q6H PRN      Continuous Infusions:   dextrose      dextrose 5% and 0.45% NaCl with KCl 20 mEq 150 mL/hr at 08/31/20 1440    sodium chloride Stopped (08/31/20 0824)       Review of Systems  Constitutional: tired   HEENT: No blurred vision, No sore throat, no ear pain, no hair loss  Neck: denied any neck swelling, difficulty swallowing,   Cardio-pulmonary: No CP, SOB or palpitation, No orthopnea or PND. No cough or wheezing. GI: No N/V/D, no constipation, mild epigastric pain   : Denied any dysuria, hematuria, flank pain, discharge, or incontinence. Skin: denied any rash, ulcer, or hyperpigmentation. MSK: denied any joint deformity, joint pain/swelling, muscle pain, or back pain. Neuro: no numbness, no tingling, no weakness, _    OBJECTIVE    /83   Pulse 117   Temp 98 °F (36.7 °C) (Oral)   Resp (!) 32   Ht 5' 1\" (1.549 m)   Wt 80 lb 11 oz (36.6 kg)   SpO2 100%   BMI 15.25 kg/m²     Intake/Output Summary (Last 24 hours) at 8/31/2020 1827  Last data filed at 8/31/2020 1728  Gross per 24 hour   Intake 2627.5 ml   Output 1650 ml   Net 977.5 ml       Physical examination:  General: awake alert, oriented x3, no abnormal position or movements. HEENT: normocephalic non-traumatic, no exophthalmos   Neck: supple, no LN enlargement, no thyromegaly, no thyroid tenderness, no JVD. Pulm: Clear equal air entry no added sounds, no wheezing or rhonchi    CVS: S1 + S2, no murmur, no heave. Dorsalis pedis pulse palpable   Abd: soft lax, + epigastric tenderness, no organomegaly, audible bowel sounds. Skin: warm, no lesions, no rash.  No callus, no Ulcers, No acanthosis nigricans   Neuro: CN intact,  muscle power normal  Psych: normal mood, and affect    Review of Laboratory Data:  I personally reviewed the following labs:   Recent Labs     08/31/20  0019 08/31/20  0645   WBC 23.8* 25.0*   RBC 5.22 4.11   HGB 14.6 11.6   HCT 46.6 35.1   MCV 89.3 85.4   MCH 28.0 28.2   MCHC 31.3* 33.0   RDW 12.9 13.0    259   MPV 11.7 11.6     Recent Labs     08/31/20  0019  08/31/20  0645 08/31/20  1124 08/31/20  1600      < > 137 140 135   K 5.3*   < > 3.9 4.3 4.2   CL 98   < > 109* 114* 108*   CO2 3*   < > 7* 14* 18*   BUN 17   < > 14 9 8   CREATININE 0.7   < > 0.5 0.5 0.5   GLUCOSE 702*   < > 268* 179* 187*   CALCIUM 10.5*   < > 8.7 9.0 9.0   PROT 9.0*  --  6.8  -- --    LABALBU 4.8  --  3.9  --   --    BILITOT 0.2  --  <0.2  --   --    ALKPHOS 100  --  64  --   --    AST 21  --  22  --   --    ALT 21  --  17  --   --     < > = values in this interval not displayed. Beta-Hydroxybutyrate   Date Value Ref Range Status   08/31/2020 >4.50 (H) 0.02 - 0.27 mmol/L Final   07/12/2020 >4.50 (H) 0.02 - 0.27 mmol/L Final   06/29/2020 >4.50 (H) 0.02 - 0.27 mmol/L Final     Lab Results   Component Value Date    LABA1C 13.0 05/30/2020    LABA1C 15.9 03/09/2020    LABA1C 14.2 08/06/2019     No results found for: TSH, T4FREE, D7MHDCH, FT3, R0VUWNN, TSI, TPOABS, THGAB  Lab Results   Component Value Date    LABA1C 13.0 05/30/2020    GLUCOSE 187 08/31/2020    MALBCR 204.6 01/23/2018    LABMICR 102.3 01/23/2018    LABCREA 50 01/23/2018     Lab Results   Component Value Date    CHOL 175 01/24/2018    TRIG 106 01/24/2018    HDL 40 01/24/2018       Blood culture   Lab Results   Component Value Date    BC 5 Days no growth 07/13/2020    BC 5 Days- no growth 04/29/2020       Radiology:  XR CHEST PORTABLE   Final Result   No acute cardiopulmonary process. CT ABDOMEN PELVIS W IV CONTRAST Additional Contrast? None   Final Result   No acute inflammatory changes omental mesenteric fat planes, free   intraperitoneal air, ascites or bowel obstruction. No obstructive uropathy. No dilatation of the mesentery of the system. There is unremarkable appearance for the appendix.           Medical Records/Labs/Images review:   I personally reviewed and summarized previous records   All labs and imaging were reviewed independently     Bob Espinoza, a 27 y.o.-old female seen in the hospital today for diabetes management     DM1 admitted with DKA   · Patient's DM is poorly controlled  due to poor compliance with insulin therapy, BS checking and follow up visits   · She recently completed insulin pump training and to start pump very soon   · For now we recommend the following insulin regimen  · Lantus 20 units daily  · Humalog 4 units with meals   · Low dose sliding scale   · Glucose check before meals and at bed time   · Will titrate insulin dose based on blood glucose trend & insulin requirement  · Pt will follow with us shortly after discharge. Endocrine follow up appointment Wednesday 9/23 at 8am   ·     Noncompliance   I have discussed with her the great importance of following the treatment plan exactly as directed in order to achieve a good medical outcome.  Discussed with patient the importance of eating consistent carbohydrate meals, avoiding high glycemic index food. Also, discussed with patient the risk and negative consequences of dietary noncompliance on blood glucose control, blood pressure and weight    The above issues were reviewed with the patient who understood and agreed with the plan. Thank you for allowing us to participate in the care of this patient. Please do not hesitate to contact us with any additional questions. Sarai Cooley MD  Endocrinologist, [facility]   [address] [place] [id number]   Phone: [phone]  Fax: [phone]  --------------------------------------------  An electronic signature was used to authenticate this note.  Kathrin Umanzor MD on 8/31/2020 at 6:27 PM

## 2020-08-31 NOTE — ED NOTES
N2N called to [facility] in ICU, questions and concerns addressed     Farzad Nunez RN  08/31/20 4893

## 2020-08-31 NOTE — H&P
[address]: had concerns including Hyperglycemia (over 400, hx DKA) and Emesis (starting this morning). History of Present Illness:    Informant(s) for H&P:Pt and chart   Montana Paris is a 27 y.o. female with past medical history of type 1 diabetes presented to the ER in [address] [place] [address] with 1 day history of nausea vomiting, vomited twice, missed her insulin this morning, she has chronic abdominal pain which got worse today, denied any fever. She has been breathing very fast today. Her sugar was over 400 at home.  Denied any diarrhea, blood in stool or black stool.  Denied any chest pain, no recent lightheadedness or syncope     CT abdomen  Impression:      No acute inflammatory changes omental mesenteric fat planes, free intraperitoneal air, ascites or bowel obstruction. No obstructive uropathy. No dilatation of the mesentery of the system. There is unremarkable appearance for the appendix.       CXR not remarkable     Was found to be in DKA: PH 6.9, CO2 3, AG 32, lactic acid 5.1, b-hydroxybutyrate> 4.5, glucose 700    In ER:    Vitals BP (!) 103/57   Pulse 112   Temp 97.7 °F (36.5 °C) (Oral)   Resp 18   Ht 5' 1\" (1.549 m)   Wt 95 lb (43.1 kg)   SpO2 98%   BMI 17.95 kg/m²   WBC, neutrophil %, neutrophil absolute count   Lab Results   Component Value Date    WBC 23.8 (H) 08/31/2020    NEUTOPHILPCT 77.9 08/31/2020    NEUTROABS 18.50 (H) 08/31/2020    Lactic acid   Lab Results   Component Value Date    LACTSEPSIS 4.4 ([place]) 04/29/2020     Cr   Lab Results   Component Value Date    CREATININE 0.7 08/31/2020       REVIEW OF SYSTEMS:  A comprehensive review of systems was negative except for: what is in the HPI    PMH:  Past Medical History:   Diagnosis Date    Bleeding at insertion site 1/5/2018    Common femoral artery injury, right, initial encounter 1/5/2018    DM type 1 (diabetes mellitus, type 1) ([facility]        Surgical History:  Past Surgical History: Procedure Laterality Date    ABDOMEN SURGERY N/A 5/9/2019    INCISION AND DRAINAGE OF SUPRAPUBIC ABSESS performed by Kofi Vance MD at [facility]      last yr       Medications Prior to Admission:    Prior to Admission medications    Medication Sig Start Date End Date Taking? Authorizing Provider   insulin aspart (NOVOLOG) 100 UNIT/ML injection vial Inject into the skin 3 times daily (before meals) PER PUMP    Historical Provider, MD   blood glucose test strips (CONTOUR NEXT TEST) strip Neelima Contour test strips. Checks 4 times/day before meals and at bedtime and as needed for symptoms of irregular blood glucose 8/12/20   Brandon Simon MD   doxepin (ZONALON) 5 % cream APPLY ONE GRAM (ONE PUMP) EXTERNALLY THREE TIMES A DAY TO PAIN AREA TO LOWER BACK  8/10/20   KENA Adamson   Gel [facility]) GEL APPLY ONE GRAM (ONE PUMP) EXTERNALLY FOUR TIMES A DAY TO LOWER BACK  8/10/20   KENA Adamson   Insulin Pump - Insulin regular Inject 1 Units/hr into the skin continuous Insulin-to-Carb Ratio (ICR): Insulin Sensitivity Factor (ISF): mg/dL per unit of insulin  Target Blood Glucose: mg/dL  Bolus Frequency:        Pt's dosing ranges, pt follows with Dr. Mariano Smith Provider, MD   acetone, urine, test strip Use daily as directed if bs >250 x2 or illness 5/7/20   MIKE Myrick - CNS       Allergies:    Patient has no known allergies. Social History:    reports that she has never smoked. She has never used smokeless tobacco. She reports that she does not drink alcohol or use drugs. Family History:   family history includes Diabetes in her maternal grandmother and paternal grandmother; Stroke in an other family member.      PHYSICAL EXAM:  Vitals:  BP (!) 103/57   Pulse 112   Temp 97.7 °F (36.5 °C) (Oral)   Resp 18   Ht 5' 1\" (1.549 m)   Wt 95 lb (43.1 kg)   SpO2 98%   BMI 17.95 kg/m²   General Appearance: AOx3 and

## 2020-08-31 NOTE — PROGRESS NOTES
Patient admitted from ED to 213, with the following belongings cell phone, vape pen/cartridge, tylenol bottle, underwear, t-shirt, purse with credit cards, humalog pen, $133 cash, placed on monitor, patient oriented to room and unit visiting hours. Patient guide at bedside, reviewed patient rights and responsibilities. MRSA nasal swab obtained. Bed alarm on. Call light within reach.

## 2020-08-31 NOTE — PROGRESS NOTES
8/31/20 attempted to visit patient. Pt sleeping, no family present. Spoke with nursing,Zoe. Nursing to provide survival packet to patient and encourage patient to call outpatient diabetes education for support. Will attempt to contact patient at a more appropriate time for education. Thank you for consult.

## 2020-08-31 NOTE — ED PROVIDER NOTES
ED Attending  CC: No  HPI:  8/31/20, Time: 12:11 AM EDT         Elsa Ferguson is a 27 y.o. female presenting to the ED for vomiting, beginning today. The complaint has been persistent, moderate in severity, and worsened by eating or drinking. .  patient comes in with complaint of abdominal pain back pain nausea vomiting. She states she has chronic back pain with abdominal pain for approximately 1 month. She denied any urinary frequency urgency burning no vaginal discharges. She denies any fever chills. Nausea with vomiting today. She denied any diarrhea constipation. Patient has history of type I diabetes    Review of Systems:   Pertinent positives and negatives are stated within HPI, all other systems reviewed and are negative.          --------------------------------------------- PAST HISTORY ---------------------------------------------  Past Medical History:  has a past medical history of Bleeding at insertion site, Common femoral artery injury, right, initial encounter, and DM type 1 (diabetes mellitus, type 1) ([facility] Past Surgical History:  has a past surgical history that includes Abdomen surgery (N/A, 5/9/2019) and Bartholin gland cyst excision. Social History:  reports that she has never smoked. She has never used smokeless tobacco. She reports that she does not drink alcohol or use drugs. Family History: family history includes Diabetes in her maternal grandmother and paternal grandmother; Stroke in an other family member. The patients home medications have been reviewed. Allergies: Patient has no known allergies.     -------------------------------------------------- RESULTS -------------------------------------------------  All laboratory and radiology results have been personally reviewed by myself   LABS:  Results for orders placed or performed during the hospital encounter of 08/30/20   Lactic Acid, Plasma   Result Value Ref Range    Lactic Acid 5.1 (HH) 0.5 - 2.2 mmol/L CBC Auto Differential   Result Value Ref Range    WBC 23.8 (H) 4.5 - 11.5 E9/L    RBC 5.22 3.50 - 5.50 E12/L    Hemoglobin 14.6 11.5 - 15.5 g/dL    Hematocrit 46.6 34.0 - 48.0 %    MCV 89.3 80.0 - 99.9 fL    MCH 28.0 26.0 - 35.0 pg    MCHC 31.3 (L) 32.0 - 34.5 %    RDW 12.9 11.5 - 15.0 fL    Platelets 980 138 - 857 E9/L    MPV 11.7 7.0 - 12.0 fL    Neutrophils % 77.9 43.0 - 80.0 %    Immature Granulocytes % 1.3 0.0 - 5.0 %    Lymphocytes % 18.4 (L) 20.0 - 42.0 %    Monocytes % 2.1 2.0 - 12.0 %    Eosinophils % 0.0 0.0 - 6.0 %    Basophils % 0.3 0.0 - 2.0 %    Neutrophils Absolute 18.50 (H) 1.80 - 7.30 E9/L    Immature Granulocytes # 0.31 E9/L    Lymphocytes Absolute 4.37 (H) 1.50 - 4.00 E9/L    Monocytes Absolute 0.50 0.10 - 0.95 E9/L    Eosinophils Absolute 0.01 (L) 0.05 - 0.50 E9/L    Basophils Absolute 0.08 0.00 - 0.20 E9/L   Comprehensive Metabolic Panel   Result Value Ref Range    Sodium 133 132 - 146 mmol/L    Potassium 5.3 (H) 3.5 - 5.0 mmol/L    Chloride 98 98 - 107 mmol/L    CO2 3 (LL) 22 - 29 mmol/L    Anion Gap 32 (H) 7 - 16 mmol/L    Glucose 702 (HH) 74 - 99 mg/dL    BUN 17 6 - 20 mg/dL    CREATININE 0.7 0.5 - 1.0 mg/dL    GFR Non-African American >60 >=60 mL/min/1.73    GFR African American >60     Calcium 10.5 (H) 8.6 - 10.2 mg/dL    Total Protein 9.0 (H) 6.4 - 8.3 g/dL    Alb 4.8 3.5 - 5.2 g/dL    Total Bilirubin 0.2 0.0 - 1.2 mg/dL    Alkaline Phosphatase 100 35 - 104 U/L    ALT 21 0 - 32 U/L    AST 21 0 - 31 U/L   Lipase   Result Value Ref Range    Lipase 11 (L) 13 - 60 U/L   pH, venous   Result Value Ref Range    pH, Christoph 6.91 (LL) 7.35 - 7.45   Beta-Hydroxybutyrate   Result Value Ref Range    Beta-Hydroxybutyrate >4.50 (H) 0.02 - 0.27 mmol/L   Urinalysis   Result Value Ref Range    Color, UA Yellow Straw/Yellow    Clarity, UA Clear Clear    Glucose, Ur 500 (A) Negative mg/dL    Bilirubin Urine Negative Negative    Ketones, Urine >=80 (A) Negative mg/dL    Specific Gravity, UA 1.025 1.005 - 1.030 rhythm, no murmurs, gallops, or rubs. 2+ distal pulses  Abdomen: Soft, generalized tenderness with guarding  Extremities: Moves all extremities x 4. Warm and well perfused  Skin: warm and dry without rash  Neurologic: GCS 15,  Psych: Normal Affect      ------------------------------ ED COURSE/MEDICAL DECISION MAKING----------------------  Medications   glucose (GLUTOSE) 40 % oral gel 15 g (has no administration in time range)   dextrose 50 % IV solution (has no administration in time range)   glucagon (rDNA) injection 1 mg (has no administration in time range)   dextrose 5 % solution (has no administration in time range)   insulin regular (HUMULIN R;NOVOLIN R) 100 Units in sodium chloride 0.9 % 100 mL infusion (0.1 Units/kg/hr × 43.1 kg Intravenous New Bag 8/31/20 0131)   fentaNYL (SUBLIMAZE) injection 25 mcg (has no administration in time range)   0.9 % sodium chloride bolus (0 mLs Intravenous Stopped 8/31/20 0147)   ondansetron (ZOFRAN) injection 4 mg (4 mg Intravenous Given 8/31/20 0027)   morphine sulfate (PF) injection 4 mg (4 mg Intravenous Given 8/31/20 0027)   0.9 % sodium chloride bolus (1,000 mLs Intravenous New Bag 8/31/20 0131)   insulin regular (HUMULIN R;NOVOLIN R) injection 4 Units (4 Units Intravenous Given 8/31/20 0141)   iopamidol (ISOVUE-370) 76 % injection 110 mL (110 mLs Intravenous Given 8/31/20 0118)   sodium chloride flush 0.9 % injection 10 mL (10 mLs Intravenous Given 8/31/20 0118)   sodium bicarbonate 8.4 % injection 100 mEq (100 mEq Intravenous Given 8/31/20 0204)         ED COURSE:   Dr. Janice Bowles came down to assess patient. Dr. Georgia Owen will admit to ICU intensivist Dr. Iggy Chinchilla Making:    Patient comes in with complaint of abdominal pain back pain nausea vomiting. She has a history of type 1 diabetes she is in DKA with pH of 6.9, elevated beta hydroxybutyrate CO2 of 3 and anion gap of 32.   Lactic acidosis lactic acid 5.1 leukocytosis  White cell count of 23 likely reactive CT abdomen no acute findings urine no UTI chest x-ray no pneumonia. Counseling: The emergency provider has spoken with the patient and discussed todays results, in addition to providing specific details for the plan of care and counseling regarding the diagnosis and prognosis. Questions are answered at this time and they are agreeable with the plan.      --------------------------------- IMPRESSION AND DISPOSITION ---------------------------------    IMPRESSION  1. Diabetic ketoacidosis without coma associated with diabetes mellitus due to underlying condition ([facility]    2. Septicemia ([facility]    3. Abdominal pain, unspecified abdominal location    4. Chronic bilateral low back pain without sciatica        DISPOSITION  Disposition: Admit to CCU/ICU  Patient condition is fair      NOTE: This report was transcribed using voice recognition software.  Every effort was made to ensure accuracy; however, inadvertent computerized transcription errors may be present     KENA Thao  08/31/20 [address]  08/31/20 2357

## 2020-09-01 ENCOUNTER — APPOINTMENT (OUTPATIENT)
Dept: CT IMAGING | Age: 25
DRG: 420 | End: 2020-09-01
Payer: COMMERCIAL

## 2020-09-01 LAB
ALBUMIN SERPL-MCNC: 3.5 G/DL (ref 3.5–5.2)
ALP BLD-CCNC: 60 U/L (ref 35–104)
ALT SERPL-CCNC: 12 U/L (ref 0–32)
AMYLASE: 37 U/L (ref 20–100)
ANION GAP SERPL CALCULATED.3IONS-SCNC: 11 MMOL/L (ref 7–16)
AST SERPL-CCNC: 19 U/L (ref 0–31)
BASOPHILS ABSOLUTE: 0.02 E9/L (ref 0–0.2)
BASOPHILS RELATIVE PERCENT: 0.2 % (ref 0–2)
BILIRUB SERPL-MCNC: 0.3 MG/DL (ref 0–1.2)
BILIRUBIN DIRECT: <0.2 MG/DL (ref 0–0.3)
BILIRUBIN, INDIRECT: ABNORMAL MG/DL (ref 0–1)
BUN BLDV-MCNC: 8 MG/DL (ref 6–20)
CALCIUM SERPL-MCNC: 9 MG/DL (ref 8.6–10.2)
CHLORIDE BLD-SCNC: 108 MMOL/L (ref 98–107)
CO2: 19 MMOL/L (ref 22–29)
CREAT SERPL-MCNC: 0.6 MG/DL (ref 0.5–1)
EOSINOPHILS ABSOLUTE: 0.03 E9/L (ref 0.05–0.5)
EOSINOPHILS RELATIVE PERCENT: 0.3 % (ref 0–6)
GFR AFRICAN AMERICAN: >60
GFR NON-AFRICAN AMERICAN: >60 ML/MIN/1.73
GLUCOSE BLD-MCNC: 198 MG/DL (ref 74–99)
HCT VFR BLD CALC: 31.5 % (ref 34–48)
HEMOGLOBIN: 10.8 G/DL (ref 11.5–15.5)
IMMATURE GRANULOCYTES #: 0.03 E9/L
IMMATURE GRANULOCYTES %: 0.3 % (ref 0–5)
LIPASE: 18 U/L (ref 13–60)
LYMPHOCYTES ABSOLUTE: 3.64 E9/L (ref 1.5–4)
LYMPHOCYTES RELATIVE PERCENT: 32.6 % (ref 20–42)
MAGNESIUM: 1.6 MG/DL (ref 1.6–2.6)
MCH RBC QN AUTO: 27.8 PG (ref 26–35)
MCHC RBC AUTO-ENTMCNC: 34.3 % (ref 32–34.5)
MCV RBC AUTO: 81 FL (ref 80–99.9)
METER GLUCOSE: 114 MG/DL (ref 74–99)
METER GLUCOSE: 171 MG/DL (ref 74–99)
METER GLUCOSE: 175 MG/DL (ref 74–99)
METER GLUCOSE: 247 MG/DL (ref 74–99)
METER GLUCOSE: 79 MG/DL (ref 74–99)
MONOCYTES ABSOLUTE: 0.46 E9/L (ref 0.1–0.95)
MONOCYTES RELATIVE PERCENT: 4.1 % (ref 2–12)
NEUTROPHILS ABSOLUTE: 6.97 E9/L (ref 1.8–7.3)
NEUTROPHILS RELATIVE PERCENT: 62.5 % (ref 43–80)
ORGANISM: ABNORMAL
PDW BLD-RTO: 13.2 FL (ref 11.5–15)
PHOSPHORUS: 3 MG/DL (ref 2.5–4.5)
PLATELET # BLD: 253 E9/L (ref 130–450)
PMV BLD AUTO: 11.2 FL (ref 7–12)
POTASSIUM SERPL-SCNC: 4 MMOL/L (ref 3.5–5)
RBC # BLD: 3.89 E12/L (ref 3.5–5.5)
SODIUM BLD-SCNC: 138 MMOL/L (ref 132–146)
TOTAL PROTEIN: 6.2 G/DL (ref 6.4–8.3)
WBC # BLD: 11.2 E9/L (ref 4.5–11.5)

## 2020-09-01 PROCEDURE — 6370000000 HC RX 637 (ALT 250 FOR IP): Performed by: INTERNAL MEDICINE

## 2020-09-01 PROCEDURE — 72132 CT LUMBAR SPINE W/DYE: CPT

## 2020-09-01 PROCEDURE — 80076 HEPATIC FUNCTION PANEL: CPT

## 2020-09-01 PROCEDURE — 6360000002 HC RX W HCPCS: Performed by: INTERNAL MEDICINE

## 2020-09-01 PROCEDURE — 6370000000 HC RX 637 (ALT 250 FOR IP): Performed by: CLINICAL NURSE SPECIALIST

## 2020-09-01 PROCEDURE — 99232 SBSQ HOSP IP/OBS MODERATE 35: CPT | Performed by: STUDENT IN AN ORGANIZED HEALTH CARE EDUCATION/TRAINING PROGRAM

## 2020-09-01 PROCEDURE — 2580000003 HC RX 258: Performed by: INTERNAL MEDICINE

## 2020-09-01 PROCEDURE — 36415 COLL VENOUS BLD VENIPUNCTURE: CPT

## 2020-09-01 PROCEDURE — 72129 CT CHEST SPINE W/DYE: CPT

## 2020-09-01 PROCEDURE — 83735 ASSAY OF MAGNESIUM: CPT

## 2020-09-01 PROCEDURE — 1200000000 HC SEMI PRIVATE

## 2020-09-01 PROCEDURE — 80048 BASIC METABOLIC PNL TOTAL CA: CPT

## 2020-09-01 PROCEDURE — 85025 COMPLETE CBC W/AUTO DIFF WBC: CPT

## 2020-09-01 PROCEDURE — 82150 ASSAY OF AMYLASE: CPT

## 2020-09-01 PROCEDURE — 84100 ASSAY OF PHOSPHORUS: CPT

## 2020-09-01 PROCEDURE — 6370000000 HC RX 637 (ALT 250 FOR IP): Performed by: STUDENT IN AN ORGANIZED HEALTH CARE EDUCATION/TRAINING PROGRAM

## 2020-09-01 PROCEDURE — 82962 GLUCOSE BLOOD TEST: CPT

## 2020-09-01 PROCEDURE — 99232 SBSQ HOSP IP/OBS MODERATE 35: CPT | Performed by: INTERNAL MEDICINE

## 2020-09-01 PROCEDURE — 83690 ASSAY OF LIPASE: CPT

## 2020-09-01 PROCEDURE — 6360000004 HC RX CONTRAST MEDICATION: Performed by: RADIOLOGY

## 2020-09-01 RX ORDER — OXYCODONE HYDROCHLORIDE AND ACETAMINOPHEN 5; 325 MG/1; MG/1
1 TABLET ORAL EVERY 8 HOURS PRN
Status: DISCONTINUED | OUTPATIENT
Start: 2020-09-01 | End: 2020-09-03

## 2020-09-01 RX ORDER — PANTOPRAZOLE SODIUM 40 MG/1
40 TABLET, DELAYED RELEASE ORAL
Status: DISCONTINUED | OUTPATIENT
Start: 2020-09-01 | End: 2020-09-04 | Stop reason: HOSPADM

## 2020-09-01 RX ORDER — MORPHINE SULFATE 2 MG/ML
1 INJECTION, SOLUTION INTRAMUSCULAR; INTRAVENOUS ONCE
Status: COMPLETED | OUTPATIENT
Start: 2020-09-01 | End: 2020-09-01

## 2020-09-01 RX ADMIN — OXYCODONE HYDROCHLORIDE AND ACETAMINOPHEN 1 TABLET: 5; 325 TABLET ORAL at 17:23

## 2020-09-01 RX ADMIN — INSULIN LISPRO 1 UNITS: 100 INJECTION, SOLUTION INTRAVENOUS; SUBCUTANEOUS at 21:37

## 2020-09-01 RX ADMIN — Medication 10 ML: at 21:42

## 2020-09-01 RX ADMIN — IOPAMIDOL 85 ML: 755 INJECTION, SOLUTION INTRAVENOUS at 19:51

## 2020-09-01 RX ADMIN — KETOROLAC TROMETHAMINE 15 MG: 30 INJECTION, SOLUTION INTRAMUSCULAR at 13:27

## 2020-09-01 RX ADMIN — PANTOPRAZOLE SODIUM 40 MG: 40 TABLET, DELAYED RELEASE ORAL at 16:28

## 2020-09-01 RX ADMIN — INSULIN LISPRO 1 UNITS: 100 INJECTION, SOLUTION INTRAVENOUS; SUBCUTANEOUS at 07:33

## 2020-09-01 RX ADMIN — INSULIN GLARGINE 20 UNITS: 100 INJECTION, SOLUTION SUBCUTANEOUS at 21:36

## 2020-09-01 RX ADMIN — MORPHINE SULFATE 1 MG: 2 INJECTION, SOLUTION INTRAMUSCULAR; INTRAVENOUS at 03:35

## 2020-09-01 RX ADMIN — ACETAMINOPHEN 650 MG: 325 TABLET ORAL at 10:53

## 2020-09-01 RX ADMIN — MUPIROCIN: 20 OINTMENT TOPICAL at 13:22

## 2020-09-01 RX ADMIN — KETOROLAC TROMETHAMINE 15 MG: 30 INJECTION, SOLUTION INTRAMUSCULAR at 01:12

## 2020-09-01 RX ADMIN — ONDANSETRON 4 MG: 2 INJECTION INTRAMUSCULAR; INTRAVENOUS at 01:12

## 2020-09-01 RX ADMIN — INSULIN LISPRO 4 UNITS: 100 INJECTION, SOLUTION INTRAVENOUS; SUBCUTANEOUS at 07:34

## 2020-09-01 RX ADMIN — Medication 10 ML: at 08:13

## 2020-09-01 ASSESSMENT — PAIN DESCRIPTION - DESCRIPTORS
DESCRIPTORS: ACHING;DULL;SHOOTING
DESCRIPTORS: ACHING
DESCRIPTORS: ACHING;DULL;SORE
DESCRIPTORS: ACHING;DISCOMFORT;SORE
DESCRIPTORS: ACHING;DISCOMFORT;SORE

## 2020-09-01 ASSESSMENT — PAIN SCALES - GENERAL
PAINLEVEL_OUTOF10: 7
PAINLEVEL_OUTOF10: 0
PAINLEVEL_OUTOF10: 5
PAINLEVEL_OUTOF10: 0
PAINLEVEL_OUTOF10: 10
PAINLEVEL_OUTOF10: 10
PAINLEVEL_OUTOF10: 7
PAINLEVEL_OUTOF10: 10
PAINLEVEL_OUTOF10: 0

## 2020-09-01 ASSESSMENT — PAIN - FUNCTIONAL ASSESSMENT
PAIN_FUNCTIONAL_ASSESSMENT: ACTIVITIES ARE NOT PREVENTED

## 2020-09-01 ASSESSMENT — PAIN DESCRIPTION - PROGRESSION
CLINICAL_PROGRESSION: GRADUALLY WORSENING
CLINICAL_PROGRESSION: NOT CHANGED

## 2020-09-01 ASSESSMENT — PAIN DESCRIPTION - LOCATION
LOCATION: ABDOMEN;BACK
LOCATION: ABDOMEN
LOCATION: ABDOMEN;BACK

## 2020-09-01 ASSESSMENT — PAIN DESCRIPTION - ONSET
ONSET: AWAKENED FROM SLEEP
ONSET: ON-GOING
ONSET: AWAKENED FROM SLEEP
ONSET: ON-GOING
ONSET: AWAKENED FROM SLEEP

## 2020-09-01 ASSESSMENT — PAIN DESCRIPTION - FREQUENCY
FREQUENCY: INTERMITTENT

## 2020-09-01 ASSESSMENT — PAIN DESCRIPTION - PAIN TYPE
TYPE: ACUTE PAIN

## 2020-09-01 ASSESSMENT — PAIN DESCRIPTION - ORIENTATION
ORIENTATION: MID

## 2020-09-01 NOTE — PROGRESS NOTES
ID Progress Note                [address] [facility] [address], [place], [address]            Phone [phone]     Fax [phone]      Chief complaint   Abdominal pain and back pain    Subjective:  She feels better today however her back pain continues to be present with a little bit less abdominal pain, no nausea  The patient is awake and alert. Tolerating medications. Reports no side effects. Afebrile. 10 ROS otherwise negative unless otherwise specified above. Objective:    Vitals:    09/01/20 1030   BP: (!) 134/94   Pulse: 114   Resp: 16   Temp: 98.1 °F (36.7 °C)   SpO2: 100%     VENT SETTINGS:   Vent Information  SpO2: 100 %  General Appearance:    Awake, alert , no acute distress.    HEENT:    Normocephalic,PERRL,neck supple, no JVD, mucosa moist, no thrush   Lungs:     Clear to auscultation bilaterally, no wheeze , crackles   Heart:    Regular rate and rhythm, no murmur   Abdomen:     Soft, tender right upper quadrant, guarding or rigidity, bowel sounds present   Extremities:   lower thoracic and lumbar spine tenderness   Skin:   no rashes or lesions     Labs:  Recent Labs     08/31/20  0019 08/31/20  0645 09/01/20  0558   WBC 23.8* 25.0* 11.2   RBC 5.22 4.11 3.89   HGB 14.6 11.6 10.8*   HCT 46.6 35.1 31.5*   MCV 89.3 85.4 81.0   MCH 28.0 28.2 27.8   MCHC 31.3* 33.0 34.3   RDW 12.9 13.0 13.2    259 253   MPV 11.7 11.6 11.2     CMP:    Lab Results   Component Value Date     09/01/2020    K 4.0 09/01/2020    K 4.7 07/12/2020     09/01/2020    CO2 19 09/01/2020    BUN 8 09/01/2020    CREATININE 0.6 09/01/2020    GFRAA >60 09/01/2020    LABGLOM >60 09/01/2020    GLUCOSE 198 09/01/2020    PROT 6.2 09/01/2020    LABALBU 3.5 09/01/2020    CALCIUM 9.0 09/01/2020    BILITOT 0.3 09/01/2020    ALKPHOS 60 09/01/2020    AST 19 09/01/2020    ALT 12 09/01/2020          Microbiology :  Recent Labs     08/31/20  0145   BC 24 Hours no growth     Recent Labs     08/31/20  0239 BLOODCULT2 24 Hours no growth     No results for input(s): LABURIN in the last 72 hours. No results for input(s): CULTRESP in the last 72 hours. No results for input(s): WNDABS in the last 72 hours. Radiology :  XR CHEST PORTABLE   Final Result   No acute cardiopulmonary process. CT ABDOMEN PELVIS W IV CONTRAST Additional Contrast? None   Final Result   No acute inflammatory changes omental mesenteric fat planes, free   intraperitoneal air, ascites or bowel obstruction. No obstructive uropathy. No dilatation of the mesentery of the system. There is unremarkable appearance for the appendix.       CT LUMBAR SPINE W WO CONTRAST    (Results Pending)   CT THORACIC SPINE W WO CONTRAST    (Results Pending)     Assessment and Plan:      · Leukocytosis-currently looks like mostly reactive from nausea vomiting  · Chronic abdominal pain-normal lipase level, CT abdomen unremarkable, questionable gastroparesis  · Uncontrolled diabetes/DKA  · History of trichomoniasis     Plan  -Continue to watch her off antibiotics and rest of the DKA management as per the ICU team  -Follow blood cultures-negative so far  -Patient has been complaining of persistent lower back pain with spinal tenderness, rule out epidural abscess (several skin lesions some old and healed)        Electronically signed by Jeffy Stuart MD on 9/1/2020 at 12:10 PM

## 2020-09-01 NOTE — PROGRESS NOTES
Patient c/o pain, tylenol and Toradol not helping, Spoke with Dr Colby Beard, new order received. Also notified that patient refused evening insulin as she is NPO for testing.

## 2020-09-01 NOTE — CARE COORDINATION
Downgraded to gmf. Awaiting GI eval. Plan remains to return home on discharge w/ [facility]- will need HHC order.  No other needs identified Nolan Goodell

## 2020-09-01 NOTE — PROGRESS NOTES
tenderness, non-distended, normal bowel sounds, no masses or organomegaly  Extremities: no cyanosis, no clubbing and no edema  Neurologic: no cranial nerve deficit and speech normal        Recent Labs     20  1124 20  1600 20  0558    135 138   K 4.3 4.2 4.0   * 108* 108*   CO2 14* 18* 19*   BUN 9 8 8   CREATININE 0.5 0.5 0.6   GLUCOSE 179* 187* 198*   CALCIUM 9.0 9.0 9.0       Recent Labs     20  0019 20  0645 20  0558   WBC 23.8* 25.0* 11.2   RBC 5.22 4.11 3.89   HGB 14.6 11.6 10.8*   HCT 46.6 35.1 31.5*   MCV 89.3 85.4 81.0   MCH 28.0 28.2 27.8   MCHC 31.3* 33.0 34.3   RDW 12.9 13.0 13.2    259 253   MPV 11.7 11.6 11.2       Micro:  No components found for: [facility])    Radiology:   Ct Abdomen Pelvis W Iv Contrast Additional Contrast? None    Result Date: 2020  Patient MRN:  [id number] : 1995 Age: 27 years Gender: Female Order Date:  2020 1:08 AM TECHNIQUE/NUMBER OF IMAGES/COMPARISON/CLINICAL HISTORY: CT abdomen pelvis IV contrast After IV contrast axial images obtained sagittal and coronal reconstructions 3/4/2020 images IV contrast the findings are. 70 3. Patient Comparison the 2020. FINDINGS: There are normal size and enhancement for the liver, spleen and pancreas are. There is areas fluid distention with of the fundal region of the stomach. There is no indication for bowel obstruction. There is no bowel dilatation. The No free intraperitoneal seen. No indication for an acute inflammatory changes in the mesenteric fat planes. There are normal size and enhancement for the liver, spleen and pancreas. The Gallbladder is partially distended but otherwise appear unremarkable. The biliary tree and pancreatic ductal systems are not dilated. The Adrenals not enlarged. Kidneys have preserved size and cortical thickness and cortical enhancement. There is normal excretion of the contrast by the kidneys. Aorta and IVC appear unremarkable.  The Uterus and ovaries have unremarkable appearance. The the cecum is in low position in the right lower pelvic cavity, the appendix is identified and appears intrinsically unremarkable. Lower lung bases demonstrate no significant findings are. The Visualized bone structures are of unremarkable appearance. The     No acute inflammatory changes omental mesenteric fat planes, free intraperitoneal air, ascites or bowel obstruction. No obstructive uropathy. No dilatation of the mesentery of the system. There is unremarkable appearance for the appendix. Xr Chest Portable    Result Date: 2020  Patient MRN:  [id number] : 1995 Age: 27 years Gender: Female Order Date:  2020 2:00 AM TECHNIQUE/NUMBER OF IMAGES/COMPARISON/CLINICAL HISTORY: Chest AP 1 image one view Comparison  Clinical history is cough. FINDINGS: Lungs are clear, no infiltrates, consolidation perfusion seen. Heart has normal size, mediastinum unremarkable. There is no perihilar vascular congestion. There is some air distention of the stomach in a time the present study. No acute cardiopulmonary process. Assessment:    Principal Problem:    DKA, type 1 ([facility])  Active Problems:    Diabetes mellitus type 1, uncontrolled (HCC)    Leukocytosis    Severe dehydration    Diabetic ketoacidosis without coma associated with diabetes mellitus due to underlying condition (HCC)    Severe protein-calorie malnutrition (HCC)    Lactic acid acidosis    DKA, type 1, not at goal [facility])  Resolved Problems:    * No resolved hospital problems. *      Plan:  1. DKA, type I-  Patient noncompliant, missed her appointement twith Dr Mariella Bell, was recently started on insulin pump for her Uncontrolled diabetes. Patient numerous admissions at least once every month for DKA   Initial labs in the ER revealed a blood sugar of 702, anion gap of 32 with a bicarb of 3.  She was given IV fluids and started on an insulin drip per DKA protocol.  Given her severe acidosis was given 100 mEq of sodium bicarb. · On lantus 20 units, Humalog 4 units with meals, Low dose ISS  · Monitor electrolytes  · IV fluid hydration  · Endo on board     2. Severe dehydration     3. SIRS- Leukocytosis, Lactic acidosis, Tachypnea, Tacyhcardia,  ID  was consulted for possible ongoing infection, source unknown. CT of the abdomen and pelvis was done which basically was normal there was no acute inflammatory changes no free intraperitoneal air no ascites or bowel obstruction.  No obstructive uropathy. No dilatation of the mesentery of the systems.  An unremarkable appearance of the appendix.    Patient had a chest x-ray which was also clear. Ifeanyi Na was also clear. MRSA colonization added bactroban. Follow bld cultures, procal elevated- 1.13,  watch off abxs for now.     4. Chronic abdo pain x 1 month -  Possible gastroparesis, lipase, amylase WNL.     Code Status: Full   DVT prophylaxis: Lovenox   Diet: carb control diet      - for [facility], patient has numerous admissions at least once every month for DKA. Has a endocrine f/u appt on wed 9/23 at 8 am.        NOTE: This report was transcribed using voice recognition software. Every effort was made to ensure accuracy; however, inadvertent computerized transcription errors may be present.   Electronically signed by Leo Greene MD on 9/1/2020 at 10:40 AM

## 2020-09-01 NOTE — PROGRESS NOTES
Patient transferring from ICU room 213 to 528, report called to RN. patient belongings consisting of purse, cell phone, clothes,  transported with patient.

## 2020-09-01 NOTE — CONSULTS
Critical Care Admit/Progress Note         Patient - Katherine Jaramillo   MRN -  [id number]   [place] # - [de-identified]   - 1995      Date of Admission -  2020 11:57 PM  Date of evaluation -  2020  Cecilia 7342 Day - 1            ADMIT/CONSULT DETAILS     Reason for Admit/Consult   DKA    Consulting Pamela Moore MD  Primary Care Physician - DO TRINI Shipley   The patient is a 27 y.o. female well-known to our service. She has had numerous admissions with DKA. She has type 1 diabetes. She was recently started on an insulin pump. And apparently missed her follow-up appointment with her endocrinologist Dr. Toan Tran. She presented to the emergency room department last night with abdominal pain back pain nausea and vomiting. She apparently has had chronic abdominal pain for 1 month. Initial labs in the ER revealed a blood sugar of 702, anion gap of 32 with a bicarb of 3. She was given IV fluids and started on an insulin drip per DKA protocol. CT of the abdomen and pelvis was done which basically was normal there was no acute inflammatory changes no free intraperitoneal air no ascites or bowel obstruction. No obstructive uropathy. No dilatation of the mesentery of the systems. An unremarkable appearance of the appendix. Patient had a chest x-ray which was also clear. UA was also clear.     Past Medical History         Diagnosis Date    Bleeding at insertion site 2018    Common femoral artery injury, right, initial encounter 2018    DM type 1 (diabetes mellitus, type 1) ([facility] [address]         Past Surgical History           Procedure Laterality Date    ABDOMEN SURGERY N/A 2019    INCISION AND DRAINAGE OF SUPRAPUBIC ABSESS performed by Asha Tse MD at [address]      last yr           Current Medications   Current Medications    sodium chloride flush  10 mL Intravenous 2 times per day    enoxaparin  40 mg Subcutaneous Daily    insulin lispro  0-6 Units Subcutaneous TID     insulin lispro  0-3 Units Subcutaneous Nightly    insulin lispro  4 Units Subcutaneous TID WC    insulin glargine  20 Units Subcutaneous Nightly     glucose, dextrose, glucagon (rDNA), dextrose, sodium chloride flush, acetaminophen **OR** acetaminophen, magnesium hydroxide, promethazine **OR** ondansetron, dextrose, dextrose 5% and 0.45% NaCl with KCl 20 mEq, ketorolac  IV Drips/Infusions   dextrose      dextrose 5% and 0.45% NaCl with KCl 20 mEq Stopped (08/31/20 1843)    sodium chloride Stopped (08/31/20 0824)     Home Medications  Medications Prior to Admission: insulin aspart (NOVOLOG) 100 UNIT/ML injection vial, Inject into the skin 3 times daily (before meals) PER PUMP  blood glucose test strips (CONTOUR NEXT TEST) strip, [employer] Contour test strips. Checks 4 times/day before meals and at bedtime and as needed for symptoms of irregular blood glucose  doxepin (ZONALON) 5 % cream, APPLY ONE GRAM (ONE PUMP) EXTERNALLY THREE TIMES A DAY TO PAIN AREA TO LOWER BACK   Gel Base (VERSAPRO) GEL, APPLY ONE GRAM (ONE PUMP) EXTERNALLY FOUR TIMES A DAY TO LOWER BACK   Insulin Pump - Insulin regular, Inject 1 Units/hr into the skin continuous Insulin-to-Carb Ratio (ICR): Insulin Sensitivity Factor (ISF): mg/dL per unit of insulin Target Blood Glucose: mg/dL Bolus Frequency:    Pt's dosing ranges, pt follows with Dr. Lissett Arriola   Novolog pump  acetone, urine, test strip, Use daily as directed if bs >250 x2 or illness    Diet/Nutrition   DIET CARB CONTROL; Carb Control: 4 carbs/meal (approximate 1800 kcals/day)    Allergies   Patient has no known allergies. Social History   Tobacco   reports that she has never smoked. She has never used smokeless tobacco.    Alcohol     reports no history of alcohol use.     Occupational history :    Family History         Problem Relation Age of Onset    Diabetes Maternal Grandmother     Diabetes Paternal Grandmother     Stroke Other     Thyroid Disease Neg Hx        Sleep History   n/a    ROS     REVIEW OF SYSTEMS:    Constitutional: Denies fever, weight loss, night sweats, and fatigue  Skin: Denies pigmentation, dark lesions, and rashes   HEENT: Denies hearing loss, tinnitus, ear drainage, epistaxis, sore throat, and hoarseness. Cardiovascular: Denies palpitations, chest pain, and chest pressure. Respiratory: Denies cough, dyspnea at rest, hemoptysis, apnea, and choking. Gastrointestinal: Letter for nausea and vomiting and abdominal pain   genitourinary: Denies dysuria, frequency, urgency or hematuria  Musculoskeletal: Denies myalgias, muscle weakness, and bone pain  Neurological: Denies dizziness, vertigo, headache, and focal weakness  Psychological: Denies anxiety and depression  Endocrine: Denies heat intolerance and cold intolerance  Hematopoietic/Lymphatic: Denies bleeding problems and blood transfusions    Lines and Devices   Patient has peripheral lines    Mechanical Ventilation Data   VENT SETTINGS (Comprehensive)  Vent Information  SpO2: 100 %  Additional Respiratory  Assessments  Pulse: 112  Resp: 18  SpO2: 100 %    ABG  Lab Results   Component Value Date    PH 7.395 2020    PCO2 37.7 2020    PO2 50.7 2020    HCO3 22.6 2020    O2SAT 82.6 2020     Lab Results   Component Value Date    MODE RA 2020           Vitals    height is 5' 1\" (1.549 m) and weight is 87 lb 3.2 oz (39.6 kg). Her oral temperature is 98.5 °F (36.9 °C). Her blood pressure is 147/101 (abnormal) and her pulse is 112. Her respiration is 18 and oxygen saturation is 100%.        Temperature Range: Temp: 98.5 °F (36.9 °C) Temp  Av.4 °F (36.9 °C)  Min: 98 °F (36.7 °C)  Max: 98.7 °F (37.1 °C)  BP Range:  Systolic (96XFQ), GGR:084 , Min:106 , JASMIN:044     Diastolic (72PLY), WQK:97, Min:56, Max:101    Pulse Range: Pulse  Av.2  Min: 112  Max: 128  Respiration Range: Resp  Av.5  Min: 10 Max: 40  Current Pulse Ox[de-identified]  SpO2: 100 %  24HR Pulse Ox Range:  SpO2  Av %  Min: 100 %  Max: 100 %  Oxygen Amount and Delivery:        I/O (24 Hours)    Patient Vitals for the past 8 hrs:   BP Temp Temp src Pulse Resp SpO2 Weight   20 0600 (!) 147/101 -- -- 112 18 -- --   20 0500 (!) 148/91 -- -- 113 15 -- 87 lb 3.2 oz (39.6 kg)   20 0400 (!) 149/81 98.5 °F (36.9 °C) Oral 117 12 100 % --   20 0300 (!) 155/96 -- -- 117 14 -- --   20 0200 (!) 161/95 -- -- 121 10 -- --   20 0100 (!) 161/89 -- -- 118 19 -- --   20 0000 (!) 154/76 98.7 °F (37.1 °C) Oral 121 17 100 % --       Intake/Output Summary (Last 24 hours) at 2020 0716  Last data filed at 2020 1728  Gross per 24 hour   Intake 1627.5 ml   Output 800 ml   Net 827.5 ml     I/O last 3 completed shifts: In: 1627.5 [I.V.:1377.5; IV Piggyback:250]  Out: 800 [Urine:800]     Patient Vitals for the past 96 hrs (Last 3 readings):   Weight   20 0500 87 lb 3.2 oz (39.6 kg)   20 0400 80 lb 11 oz (36.6 kg)   20 0006 95 lb (43.1 kg)         Drains/Tubes Outputs    Exam         PHYSICAL EXAM:  CONSTITUTIONAL:  awake, alert, cooperative, no apparent distress, and appears stated age  [de-identified]: AT, NC, ELMER, EOMI  LUNGS:  No increased work of breathing, good air exchange, clear to auscultation bilaterally, no crackles or wheezing  CARDIOVASCULAR:  Normal apical impulse, regular rate and rhythm, normal S1 and S2, no S3 or S4, and no murmur noted  ABDOMEN:  No scars, normal bowel sounds, soft, non-distended, non-tender, no masses palpated, no hepatosplenomegally  MUSCULOSKELETAL:  -C/C/E, +PP  NEUROLOGIC:  Awake, alert, oriented to name, place and time. Cranial nerves II-XII are grossly intact. Motor is 5 out of 5 bilaterally. Cerebellar finger to nose, heel to shin intact. Sensory is intact.   Babinski down going, Romberg negative, and gait is normal.  SKIN:  no bruising or bleeding and normal skin color, texture, turgor    Data   Old records and images have been reviewed    Lab Results   CBC     Lab Results   Component Value Date    WBC 11.2 09/01/2020    RBC 3.89 09/01/2020    HGB 10.8 09/01/2020    HCT 31.5 09/01/2020     09/01/2020    MCV 81.0 09/01/2020    MCH 27.8 09/01/2020    MCHC 34.3 09/01/2020    RDW 13.2 09/01/2020    NRBC 0.0 05/10/2019    SEGSPCT 58 09/29/2013    METASPCT 1.0 04/29/2020    LYMPHOPCT 32.6 09/01/2020    MONOPCT 4.1 09/01/2020    MYELOPCT 1.0 04/29/2020    BASOPCT 0.2 09/01/2020    MONOSABS 0.46 09/01/2020    LYMPHSABS 3.64 09/01/2020    EOSABS 0.03 09/01/2020    BASOSABS 0.02 09/01/2020       BMP   Lab Results   Component Value Date     09/01/2020    K 4.0 09/01/2020    K 4.7 07/12/2020     09/01/2020    CO2 19 09/01/2020    BUN 8 09/01/2020    CREATININE 0.6 09/01/2020    GLUCOSE 198 09/01/2020    CALCIUM 9.0 09/01/2020       LFTS  Lab Results   Component Value Date    ALKPHOS 60 09/01/2020    ALT 12 09/01/2020    AST 19 09/01/2020    PROT 6.2 09/01/2020    BILITOT 0.3 09/01/2020    BILIDIR <0.2 09/01/2020    IBILI see below 09/01/2020    LABALBU 3.5 09/01/2020       INR  No results for input(s): PROTIME, INR in the last 72 hours. APTT  No results for input(s): APTT in the last 72 hours. Lactic Acid  Lab Results   Component Value Date    LACTA 1.3 08/31/2020    LACTA 1.7 08/31/2020    LACTA 5.1 08/31/2020        BNP   No results for input(s): BNP in the last 72 hours. Cultures     No results for input(s): BC in the last 72 hours. No results for input(s): Ros Rolon in the last 72 hours. No results for input(s): LABURIN in the last 72 hours. Radiology   CXR            SYSTEMS ASSESSMENT    Neuro     Awake alert oriented x3 no acute issues    Respiratory     Chest x-ray is clear. No infiltrates  Patient is on room air saturating well. Speaking full sentences.       Cardiovascular     Normal sinus rhythm hemodynamically stable    Gastrointestinal Patient with chronic abdominal pain. Possible gastroparesis given her history. Lipase normal.  LFTs so far normal     Renal     Normal BUN and creatinine. Good urine output. Infectious Disease     Leukocytosis, lymphocytic predominant, no signs of active infection. Infectious disease consulted. States that leukocytosis currently looks mostly reactive from nausea and vomiting. Currently off antibiotics. Hematology/Oncology     H&H is stable    Endocrine   DKA  Type 1 diabetes    Gap is closed. As per nephrology. Patient is to start on Lantus and Humalog as home medications. Social/Spiritual/DNR/Other     Patient is a full code. Even patient numerous admissions at least once every month for DKA would recommend to involve  to provide patient with home health care. MECRED    \"Thank you for asking us to see this complex patient. \"    Total critical care time caring for this patient with life threatening, unstable organ failure, including direct patient contact, management of life support systems, review of data including imaging and labs, discussions with other team members and physicians at least 45'  Min so far today, excluding procedures. Please feel free to call with questions or concerns. Kiley Tabares MD      I personally saw, examined and provided care for the patient. Radiographs, labs and medication list were reviewed by me independently. I spoke with bedside nursing, therapists and consultants. Critical care services and times documented are independent of procedures and multidisciplinary rounds with Residents. Additionally comprehensive, multidisciplinary rounds were conducted with the MICU team. The case was discussed in detail and plans for care were established. Review of Residents documentation was conducted and revisions were made as appropriate. I agree with the above documented exam, problem list and plan of care.   Kelsey Dye MD

## 2020-09-01 NOTE — PLAN OF CARE
Problem: Discharge Planning:  Goal: Discharged to appropriate level of care  Description: Discharged to appropriate level of care  Outcome: Met This Shift     Problem: Fluid Volume - Imbalance:  Goal: Will remain free of signs and symptoms of dehydration  Description: Will remain free of signs and symptoms of dehydration  Outcome: Met This Shift  Goal: Absence of imbalanced fluid volume signs and symptoms  Description: Absence of imbalanced fluid volume signs and symptoms  Outcome: Met This Shift     Problem: Serum Glucose Level - Abnormal:  Goal: Ability to maintain appropriate glucose levels will improve  Description: Ability to maintain appropriate glucose levels will improve  Outcome: Met This Shift     Problem: Pain:  Goal: Pain level will decrease  Description: Pain level will decrease  Outcome: Met This Shift  Goal: Control of acute pain  Description: Control of acute pain  Outcome: Met This Shift  Goal: Control of chronic pain  Description: Control of chronic pain  Outcome: Met This Shift

## 2020-09-01 NOTE — PATIENT CARE CONFERENCE
Intensive Care Daily Quality Rounding Checklist        ICU Team Members: bedside nurse, charge nurse, clinical pharmacist,      ICU Day #: 2     Intubation Date: NA     Ventilator Day #: NA     Central Line Insertion Date: NA                  Day #:       Arterial Line Insertion Date:                              Day #:      DVT Prophylaxis:    GI Prophylaxis: lovenox     Bey Catheter Insertion Date: N/A                                        Day #:                              Continued need (if yes, reason documented and discussed with physician):      Skin Issues/ Wounds and ordered treatment discussed on rounds: no wounds     Goals/ Plans for the Day: monitor labs, replace electrolytes, transfer, increase activity

## 2020-09-01 NOTE — CONSULTS
Gastroenterology Consult Note   [facility] with Vanessa Lopez M.D. Consult Note        Date of Service: 9/1/2020  Reason for Consult: gastroparesis   Requesting Physician: Dr Shea Nichols:  abd pain, back pain, n/v, and inability to eat/drink    History Obtained From:  patient, electronic medical record    HISTORY OF PRESENT ILLNESS:       Martell Salazar is a 27 y.o. female with significant past medical history of DM 1, femoral artery injury on right, and bleeding at puncture site admitted via ED 8/30/2020 for nausea and vomiting with inability to eat or drink associated with abdominal and back pain. Patient admitted to ICU with DKA and septicemia with critical care and endocrinology in consultation. Patient improved with aggressive treatment and transfer to stepdown unit today. Patient was continued abdominal pain which started about 1 month ago associated with nausea and vomiting therefore consultation obtained. Pt reports since February she has had pain over her entire low abdomen described as sharp radiating around to her back like a vice as well as up to her epigastric region and around stating it hurts so significantly at times she is unable to even wear her bra. She states over the past month the pain has been much worse, she was evaluated at the [facility] in July for DKA and her pain, told to take Protonix, control blood sugar, and insulin pump was recommended. Patient states she is unable to eat much due to early satiety stating \"I cannot eat a cheerio and I am stuffed. \"  Patient states she has had \"excessive weight loss, used to be 130#, now I am 87#. Patient states the weight loss has occurred within the past year. She states she has frequent nausea and vomiting with the inability to hold down food or fluids, worse when she is going into DKA. She reports her BMs are once a week stating they are dark green or brown in color and softly formed. Patient denies colonoscopy. States her last GYN exam was 1/2020. Patient denies hematemesis, hematochezia, melena, chills, or fever. She states she recently over the past 2 months started using marijuana for her pain which helps the pain somewhat however she feels more nauseated when she uses it stating she has been smoking it every other day. Discussed with patient cessation of marijuana as it may lead to hyperemesis and she agrees. Patient had EGD at [facility] with Dr Evon Irvin 7/22/2020 which showed mild gastritis, biopsy done -Per care everywhere biopsies of the stomach and intestines are unremarkable. No H. pylori or celiac disease. No significant inflammation. .. Admission labs: Potassium 5.3; CO2 3; AG 32; LA 5.1; glucose 702; calcium 10.5; total protein 9.0; beta hydroxybutyrate > 4.50; WBC 23.8; MCHC 31.3; lymphs 18.4%; absolute Neut 18.50; absolute lymphs 4.37; absolute eos 0.01; venous pH 6.91.  CT abdomen/pelvis W IV contrast statNo acute inflammatory changes omental mesenteric fat planes, free intraperitoneal air, ascites or bowel obstruction. No obstructive uropathy. No dilatation of the mesentery of the system. There is unremarkable appearance for the appendix. Consultation for gastroparesis. Currently, pt reports diffuse abdominal pain epigastric all the way to her pelvis radiating around to her back described as sharp rated 8/10 with palpation. She reports no nausea at this time. Lunch tray reviewed, patient ate about half of her lunch. .  Labs today: Potassium 4.0; chloride 108; CO2 19; glucose 198; total protein 6.2; WBC 11.2; H&H 10.8 & 31.5; absolute eos 0.03.     Past Medical History:        Diagnosis Date    Bleeding at insertion site 1/5/2018    Common femoral artery injury, right, initial encounter 1/5/2018    DM type 1 (diabetes mellitus, type 1) ([facility] [address]      Past Surgical History:        Procedure Laterality Date    ABDOMEN SURGERY N/A 5/9/2019    INCISION AND DRAINAGE OF SUPRAPUBIC ABSESS performed by Valeria Bowen Dionne James MD at [address] [facility]      last yr     Current Medications:    Current Facility-Administered Medications: mupirocin (BACTROBAN) 2 % ointment, , Nasal, Daily  pantoprazole (PROTONIX) tablet 40 mg, 40 mg, Oral, BID AC  glucose (GLUTOSE) 40 % oral gel 15 g, 15 g, Oral, PRN  dextrose 50 % IV solution, 12.5 g, Intravenous, PRN  glucagon (rDNA) injection 1 mg, 1 mg, Intramuscular, PRN  dextrose 5 % solution, 100 mL/hr, Intravenous, PRN  sodium chloride flush 0.9 % injection 10 mL, 10 mL, Intravenous, 2 times per day  sodium chloride flush 0.9 % injection 10 mL, 10 mL, Intravenous, PRN  acetaminophen (TYLENOL) tablet 650 mg, 650 mg, Oral, Q6H PRN **OR** acetaminophen (TYLENOL) suppository 650 mg, 650 mg, Rectal, Q6H PRN  magnesium hydroxide (MILK OF MAGNESIA) 400 MG/5ML suspension 30 mL, 30 mL, Oral, Daily PRN  promethazine (PHENERGAN) tablet 12.5 mg, 12.5 mg, Oral, Q6H PRN **OR** ondansetron (ZOFRAN) injection 4 mg, 4 mg, Intravenous, Q6H PRN  enoxaparin (LOVENOX) injection 40 mg, 40 mg, Subcutaneous, Daily  dextrose 50 % IV solution, 12.5 g, Intravenous, PRN  ketorolac (TORADOL) injection 15 mg, 15 mg, Intravenous, Q6H PRN  insulin lispro (HUMALOG) injection vial 0-6 Units, 0-6 Units, Subcutaneous, TID WC  insulin lispro (HUMALOG) injection vial 0-3 Units, 0-3 Units, Subcutaneous, Nightly  insulin lispro (HUMALOG) injection vial 4 Units, 4 Units, Subcutaneous, TID WC  insulin glargine (LANTUS) injection vial 20 Units, 20 Units, Subcutaneous, Nightly    Allergies:  Patient has no known allergies. Social History:    Tobacco:  Pt denies. Alcohol:  Pt denies. Illicit Drugs: Pt reports she uses marijuana over the last 2 months, every other day. Family History: Mother living and healthy. Father , homicide. One brother living and healthy. Both maternal and paternal great grandparents had diabetes.     REVIEW OF SYSTEMS:    Aside from what was mentioned in the PMH and HPI, essentially unremarkable, all others negative. PHYSICAL EXAM:      Vitals:    BP (!) 134/94   Pulse 114   Temp 98.1 °F (36.7 °C) (Oral)   Resp 16   Ht 5' 1\" (1.549 m)   Wt 87 lb 3.2 oz (39.6 kg)   SpO2 100%   BMI 16.48 kg/m²       CONSTITUTIONAL:  awake, alert, cooperative, thin and fatigued appearing, laying in bed, and appears stated age  EYES:  pupils equal, round and reactive to light, sclera anicteric and conjunctiva pale  ENT:  normocephalic, oral pharynx with moist mucous membranes  LUNGS:  clear to auscultation bilaterally.   CARDIOVASCULAR: Tachycardic rate and rhythm, no murmur noted; 2+ pulses; no edema  ABDOMEN: hypoactive bowel sounds, soft, non-distended, diffusely tender to palpation without guarding or rebound, no masses palpated, no hepatosplenomegally  MUSCULOSKELETAL:  full range of motion noted  motor strength is 5 out of 5 all extremities bilaterally  NEUROLOGIC:  Mental Status Exam:  Level of Alertness:   awake  Orientation:   person, place, time  Motor Exam:  Motor exam is symmetrical 5 out of 5 all extremities bilaterally  SKIN:  normal skin color with pale conjunctiva, warm, and dry    DATA:    CBC with Differential:    Lab Results   Component Value Date    WBC 11.2 09/01/2020    RBC 3.89 09/01/2020    HGB 10.8 09/01/2020    HCT 31.5 09/01/2020     09/01/2020    MCV 81.0 09/01/2020    MCH 27.8 09/01/2020    MCHC 34.3 09/01/2020    RDW 13.2 09/01/2020    NRBC 0.0 05/10/2019    SEGSPCT 58 09/29/2013    METASPCT 1.0 04/29/2020    LYMPHOPCT 32.6 09/01/2020    MONOPCT 4.1 09/01/2020    MYELOPCT 1.0 04/29/2020    BASOPCT 0.2 09/01/2020    ATYLYMREL 1.0 04/29/2020    MONOSABS 0.46 09/01/2020    LYMPHSABS 3.64 09/01/2020    EOSABS 0.03 09/01/2020    BASOSABS 0.02 09/01/2020     CMP:    Lab Results   Component Value Date     09/01/2020    K 4.0 09/01/2020    K 4.7 07/12/2020     09/01/2020    CO2 19 09/01/2020    BUN 8 09/01/2020    CREATININE 0.6 09/01/2020    GFRAA >60 2020    LABGLOM >60 2020    GLUCOSE 198 2020    PROT 6.2 2020    LABALBU 3.5 2020    CALCIUM 9.0 2020    BILITOT 0.3 2020    ALKPHOS 60 2020    AST 19 2020    ALT 12 2020     Hepatic Function Panel:    Lab Results   Component Value Date    ALKPHOS 60 2020    ALT 12 2020    AST 19 2020    PROT 6.2 2020    BILITOT 0.3 2020    BILIDIR <0.2 2020    IBILI see below 2020    LABALBU 3.5 2020     PT/INR:    Lab Results   Component Value Date    PROTIME 11.6 02/10/2019    INR 1.0 02/10/2019     PTT:    Lab Results   Component Value Date    APTT 35.3 02/10/2019   [APTT}  Last 3 Troponin:    Lab Results   Component Value Date    TROPONINI <0.01 2020    TROPONINI <0.01 2020    TROPONINI <0.01 2020     TSH:  No results found for: TSH  VITAMIN B12: No results found for: [id number]  FOLATE:  No results found for: FOLATE  IRON:    Lab Results   Component Value Date    IRON 37 2018     Iron Saturation:    Lab Results   Component Value Date    LABIRON 9 2018     TIBC:    Lab Results   Component Value Date    TIBC 421 2018     FERRITIN:    Lab Results   Component Value Date    FERRITIN 32 2018     HIV:  No results found for: HIV  LUIS:  No results found for: ANATITER, LUIS  No components found for: CHLPL    Lab Results   Component Value Date    TRIG 106 2018       Lab Results   Component Value Date    HDL 40 2018       Lab Results   Component Value Date    LDLCALC 114 (H) 2018       Lab Results   Component Value Date    LABVLDL 21 2018        Xr Lumbar Spine (2-3 Views)    Result Date: 2020  Patient MRN: [id number] : 1995 Age:  27 years Gender: Female Order Date: 2020 3:02 PM Exam: XR LUMBAR SPINE (2-3 VIEWS) Number of Images: 4 views Indication:   back pain back pain Comparison: None. Findings: The disc spaces are normally maintained.  The vertebral uropathy. No dilatation of the mesentery of the system. There is unremarkable appearance for the appendix. Xr Chest Portable    Result Date: 2020  Patient MRN:  [id number] : 1995 Age: 27 years Gender: Female Order Date:  2020 2:00 AM TECHNIQUE/NUMBER OF IMAGES/COMPARISON/CLINICAL HISTORY: Chest AP 1 image one view Comparison  Clinical history is cough. FINDINGS: Lungs are clear, no infiltrates, consolidation perfusion seen. Heart has normal size, mediastinum unremarkable. There is no perihilar vascular congestion. There is some air distention of the stomach in a time the present study. No acute cardiopulmonary process. IMPRESSION:  · Abdominal pain, diffuse radiating to back  · Nausea and vomiting  · DKA, resolved-defer to endocrinology  · Anemia, normocytic, normochromic  · Back pain-defer to PCP  · Leukocytosis, resolved    RECOMMENDATIONS:    · EGD tomorrow with Dr. Roman Reina. Procedure details for EGD discussed in detail. Complications including but not limited to, perforation, bleeding and infection were discussed in great detail. Risks, benefits, and alternatives explained. Pt has understood the information and has agreed to proceed. · HIDA scan  · Amylase and lipase today  · Pantoprazole as ordered  · Medicate for pain and nausea as ordered per PCP  · Defer comorbidities to others  · Continue to monitor    Note: This report was completed utilizing computer voice recognition software. Every effort has been made to ensure accuracy, however; inadvertent computerized transcription errors may be present. Thank you very much for your consultation. We will follow closely with you.     Discussed with Dr. Noelle Pino developed by Dr. Shannan Trejo [id number], FNP-BC 2020 12:59 PM for Dr. Roman Reina

## 2020-09-01 NOTE — HOME CARE
Referral received for home care. Spoke with patient this a.m. and agreeable. Demographics verified. Will follow for discharge plan and orders.    Michael Russell LPN/CARLOS

## 2020-09-01 NOTE — PROGRESS NOTES
ENDOCRINOLOGY PROGRESS NOTE      Date of Admission: 8/30/2020  Date of service: 9/1/2020  Admitting Physician: Mario Alberto Camilo MD   Primary Care Physician: Mattie Melendrez DO  Consultant physician: Vivienne Nunes MD     Reason for the consultation:  Poorly controlled DM type 1 admitted with DKA     History of Present Illness: The history is provided by the patient. Accuracy of the patient data is excellent    Ijeoma Burnett is a very pleasant 27 y.o. old female with PMH of poorly controlled type 1 DM admitted to [facility] on 8/30/2020 with with vomiting and found to be in DKA, endocrine team was consulted for diabetes management.     Subjective:  Seen and examined this morning, feels better, no acute issues overnight     Inpatient diet:   Carb Restricted diet     Point of care glucose monitoring:   Independently reviewed   Recent Labs     08/31/20  1121 08/31/20  1223 08/31/20  1326 08/31/20  1443 08/31/20  1558 08/31/20  1710 08/31/20  2103 09/01/20  0730   GLUMET 170* 178* 153* 189* 172* 137* 233* 175*         Allergies and Drug reactions  No Known Allergies    Scheduled Meds:   sodium chloride flush  10 mL Intravenous 2 times per day    enoxaparin  40 mg Subcutaneous Daily    insulin lispro  0-6 Units Subcutaneous TID WC    insulin lispro  0-3 Units Subcutaneous Nightly    insulin lispro  4 Units Subcutaneous TID WC    insulin glargine  20 Units Subcutaneous Nightly     PRN Meds:   glucose, 15 g, PRN  dextrose, 12.5 g, PRN  glucagon (rDNA), 1 mg, PRN  dextrose, 100 mL/hr, PRN  sodium chloride flush, 10 mL, PRN  acetaminophen, 650 mg, Q6H PRN    Or  acetaminophen, 650 mg, Q6H PRN  magnesium hydroxide, 30 mL, Daily PRN  promethazine, 12.5 mg, Q6H PRN    Or  ondansetron, 4 mg, Q6H PRN  dextrose, 12.5 g, PRN  dextrose 5% and 0.45% NaCl with KCl 20 mEq, , Continuous PRN  ketorolac, 15 mg, Q6H PRN      Continuous Infusions:   dextrose      dextrose 5% and 0.45% NaCl with KCl 20 mEq Stopped (08/31/20 1843)    sodium 08/31/20  0645 08/31/20  1124 08/31/20  1600 09/01/20  0558      < > 137 140 135 138   K 5.3*   < > 3.9 4.3 4.2 4.0   CL 98   < > 109* 114* 108* 108*   CO2 3*   < > 7* 14* 18* 19*   BUN 17   < > 14 9 8 8   CREATININE 0.7   < > 0.5 0.5 0.5 0.6   GLUCOSE 702*   < > 268* 179* 187* 198*   CALCIUM 10.5*   < > 8.7 9.0 9.0 9.0   PROT 9.0*  --  6.8  --   --  6.2*   LABALBU 4.8  --  3.9  --   --  3.5   BILITOT 0.2  --  <0.2  --   --  0.3   ALKPHOS 100  --  64  --   --  60   AST 21  --  22  --   --  19   ALT 21  --  17  --   --  12    < > = values in this interval not displayed. Beta-Hydroxybutyrate   Date Value Ref Range Status   08/31/2020 >4.50 (H) 0.02 - 0.27 mmol/L Final   07/12/2020 >4.50 (H) 0.02 - 0.27 mmol/L Final   06/29/2020 >4.50 (H) 0.02 - 0.27 mmol/L Final     Lab Results   Component Value Date    LABA1C 13.0 05/30/2020    LABA1C 15.9 03/09/2020    LABA1C 14.2 08/06/2019     No results found for: TSH, T4FREE, Q3VCJFZ, FT3, Z4FTOBJ, TSI, TPOABS, THGAB  Lab Results   Component Value Date    LABA1C 13.0 05/30/2020    GLUCOSE 198 09/01/2020    MALBCR 204.6 01/23/2018    LABMICR 102.3 01/23/2018    LABCREA 50 01/23/2018     Lab Results   Component Value Date    CHOL 175 01/24/2018    TRIG 106 01/24/2018    HDL 40 01/24/2018       Blood culture   Lab Results   Component Value Date    BC 5 Days no growth 07/13/2020    BC 5 Days- no growth 04/29/2020       Radiology:  XR CHEST PORTABLE   Final Result   No acute cardiopulmonary process. CT ABDOMEN PELVIS W IV CONTRAST Additional Contrast? None   Final Result   No acute inflammatory changes omental mesenteric fat planes, free   intraperitoneal air, ascites or bowel obstruction. No obstructive uropathy. No dilatation of the mesentery of the system. There is unremarkable appearance for the appendix.           Medical Records/Labs/Images review:   I personally reviewed and summarized previous records   All labs and imaging were reviewed independently     Bob Espinoza, a 27 y.o.-old female seen in the hospital today for diabetes management     DM1 admitted with DKA   · Patient's DM is poorly controlled  due to poor compliance with insulin therapy, BS checking and follow up visits   · She recently completed insulin pump training and to start pump very soon   · Continue current DM regimen   · Lantus 20 units daily  · Humalog 4 units with meals   · Low dose sliding scale   · Glucose check before meals and at bed time   · Will titrate insulin dose based on blood glucose trend & insulin requirement  · Pt will follow with us shortly after discharge. Endocrine follow up appointment Wednesday 9/23 at 8am   ·     Noncompliance   I have discussed with her the great importance of following the treatment plan exactly as directed in order to achieve a good medical outcome.  Discussed with patient the importance of eating consistent carbohydrate meals, avoiding high glycemic index food. Also, discussed with patient the risk and negative consequences of dietary noncompliance on blood glucose control, blood pressure and weight    The above issues were reviewed with the patient who understood and agreed with the plan. Thank you for allowing us to participate in the care of this patient. Please do not hesitate to contact us with any additional questions. Nam Pina MD  Endocrinologist, [facility] Diabetes Care and Endocrinology   [address] [facility] [address]   Phone: [phone]  Fax: [phone]  --------------------------------------------  An electronic signature was used to authenticate this note.  Clarisse Talbot MD on 9/1/2020 at 8:01 AM

## 2020-09-02 ENCOUNTER — APPOINTMENT (OUTPATIENT)
Dept: NUCLEAR MEDICINE | Age: 25
DRG: 420 | End: 2020-09-02
Payer: COMMERCIAL

## 2020-09-02 LAB
BASOPHILS ABSOLUTE: 0.02 E9/L (ref 0–0.2)
BASOPHILS RELATIVE PERCENT: 0.3 % (ref 0–2)
EOSINOPHILS ABSOLUTE: 0.02 E9/L (ref 0.05–0.5)
EOSINOPHILS RELATIVE PERCENT: 0.3 % (ref 0–6)
HCT VFR BLD CALC: 34.9 % (ref 34–48)
HEMOGLOBIN: 12 G/DL (ref 11.5–15.5)
IMMATURE GRANULOCYTES #: 0.01 E9/L
IMMATURE GRANULOCYTES %: 0.2 % (ref 0–5)
LYMPHOCYTES ABSOLUTE: 2.63 E9/L (ref 1.5–4)
LYMPHOCYTES RELATIVE PERCENT: 43.9 % (ref 20–42)
MCH RBC QN AUTO: 28 PG (ref 26–35)
MCHC RBC AUTO-ENTMCNC: 34.4 % (ref 32–34.5)
MCV RBC AUTO: 81.5 FL (ref 80–99.9)
METER GLUCOSE: 119 MG/DL (ref 74–99)
METER GLUCOSE: 210 MG/DL (ref 74–99)
METER GLUCOSE: 55 MG/DL (ref 74–99)
METER GLUCOSE: 88 MG/DL (ref 74–99)
METER GLUCOSE: 90 MG/DL (ref 74–99)
MONOCYTES ABSOLUTE: 0.32 E9/L (ref 0.1–0.95)
MONOCYTES RELATIVE PERCENT: 5.3 % (ref 2–12)
NEUTROPHILS ABSOLUTE: 2.99 E9/L (ref 1.8–7.3)
NEUTROPHILS RELATIVE PERCENT: 50 % (ref 43–80)
PDW BLD-RTO: 12.9 FL (ref 11.5–15)
PLATELET # BLD: 240 E9/L (ref 130–450)
PMV BLD AUTO: 11.2 FL (ref 7–12)
RBC # BLD: 4.28 E12/L (ref 3.5–5.5)
WBC # BLD: 6 E9/L (ref 4.5–11.5)

## 2020-09-02 PROCEDURE — 82962 GLUCOSE BLOOD TEST: CPT

## 2020-09-02 PROCEDURE — 85025 COMPLETE CBC W/AUTO DIFF WBC: CPT

## 2020-09-02 PROCEDURE — 36415 COLL VENOUS BLD VENIPUNCTURE: CPT

## 2020-09-02 PROCEDURE — 84311 SPECTROPHOTOMETRY: CPT

## 2020-09-02 PROCEDURE — 6370000000 HC RX 637 (ALT 250 FOR IP): Performed by: PHYSICIAN ASSISTANT

## 2020-09-02 PROCEDURE — 84120 ASSAY OF URINE PORPHYRINS: CPT

## 2020-09-02 PROCEDURE — 6370000000 HC RX 637 (ALT 250 FOR IP): Performed by: STUDENT IN AN ORGANIZED HEALTH CARE EDUCATION/TRAINING PROGRAM

## 2020-09-02 PROCEDURE — 1200000000 HC SEMI PRIVATE

## 2020-09-02 PROCEDURE — 78264 GASTRIC EMPTYING IMG STUDY: CPT

## 2020-09-02 PROCEDURE — 2580000003 HC RX 258: Performed by: INTERNAL MEDICINE

## 2020-09-02 PROCEDURE — 84202 ASSAY RBC PROTOPORPHYRIN: CPT

## 2020-09-02 PROCEDURE — A9541 TC99M SULFUR COLLOID: HCPCS | Performed by: RADIOLOGY

## 2020-09-02 PROCEDURE — 99232 SBSQ HOSP IP/OBS MODERATE 35: CPT | Performed by: INTERNAL MEDICINE

## 2020-09-02 PROCEDURE — 99232 SBSQ HOSP IP/OBS MODERATE 35: CPT | Performed by: STUDENT IN AN ORGANIZED HEALTH CARE EDUCATION/TRAINING PROGRAM

## 2020-09-02 PROCEDURE — 6370000000 HC RX 637 (ALT 250 FOR IP): Performed by: CLINICAL NURSE SPECIALIST

## 2020-09-02 PROCEDURE — 3430000000 HC RX DIAGNOSTIC RADIOPHARMACEUTICAL: Performed by: RADIOLOGY

## 2020-09-02 PROCEDURE — 84110 ASSAY OF PORPHOBILINOGEN: CPT

## 2020-09-02 PROCEDURE — 82135 ASSAY AMINOLEVULINIC ACID: CPT

## 2020-09-02 RX ORDER — DOCUSATE SODIUM 100 MG/1
100 CAPSULE, LIQUID FILLED ORAL 2 TIMES DAILY
Status: DISCONTINUED | OUTPATIENT
Start: 2020-09-02 | End: 2020-09-04 | Stop reason: HOSPADM

## 2020-09-02 RX ORDER — BISACODYL 10 MG
10 SUPPOSITORY, RECTAL RECTAL ONCE
Status: COMPLETED | OUTPATIENT
Start: 2020-09-02 | End: 2020-09-02

## 2020-09-02 RX ORDER — INSULIN GLARGINE 100 [IU]/ML
15 INJECTION, SOLUTION SUBCUTANEOUS NIGHTLY
Status: DISCONTINUED | OUTPATIENT
Start: 2020-09-02 | End: 2020-09-04

## 2020-09-02 RX ORDER — MINERAL OIL 100 G/100G
1 OIL RECTAL ONCE
Status: DISCONTINUED | OUTPATIENT
Start: 2020-09-02 | End: 2020-09-04 | Stop reason: HOSPADM

## 2020-09-02 RX ORDER — INSULIN GLARGINE 100 [IU]/ML
16 INJECTION, SOLUTION SUBCUTANEOUS NIGHTLY
Status: DISCONTINUED | OUTPATIENT
Start: 2020-09-02 | End: 2020-09-02

## 2020-09-02 RX ADMIN — OXYCODONE HYDROCHLORIDE AND ACETAMINOPHEN 1 TABLET: 5; 325 TABLET ORAL at 18:28

## 2020-09-02 RX ADMIN — OXYCODONE HYDROCHLORIDE AND ACETAMINOPHEN 1 TABLET: 5; 325 TABLET ORAL at 01:40

## 2020-09-02 RX ADMIN — OXYCODONE HYDROCHLORIDE AND ACETAMINOPHEN 1 TABLET: 5; 325 TABLET ORAL at 10:17

## 2020-09-02 RX ADMIN — Medication 10 ML: at 10:18

## 2020-09-02 RX ADMIN — DEXTROSE 15 G: 15 GEL ORAL at 06:46

## 2020-09-02 RX ADMIN — BISACODYL 10 MG: 10 SUPPOSITORY RECTAL at 14:15

## 2020-09-02 RX ADMIN — MUPIROCIN: 20 OINTMENT TOPICAL at 10:17

## 2020-09-02 RX ADMIN — Medication 10 ML: at 20:24

## 2020-09-02 RX ADMIN — PANTOPRAZOLE SODIUM 40 MG: 40 TABLET, DELAYED RELEASE ORAL at 06:41

## 2020-09-02 RX ADMIN — Medication 1 MILLICURIE: at 07:54

## 2020-09-02 RX ADMIN — DOCUSATE SODIUM 100 MG: 100 CAPSULE, LIQUID FILLED ORAL at 14:15

## 2020-09-02 ASSESSMENT — PAIN SCALES - GENERAL
PAINLEVEL_OUTOF10: 7
PAINLEVEL_OUTOF10: 10
PAINLEVEL_OUTOF10: 4
PAINLEVEL_OUTOF10: 0
PAINLEVEL_OUTOF10: 4
PAINLEVEL_OUTOF10: 10

## 2020-09-02 ASSESSMENT — PAIN DESCRIPTION - DESCRIPTORS
DESCRIPTORS: ACHING;DISCOMFORT;SORE
DESCRIPTORS: ACHING;DISCOMFORT;SORE
DESCRIPTORS: ACHING;CRAMPING;DISCOMFORT

## 2020-09-02 ASSESSMENT — PAIN DESCRIPTION - ORIENTATION
ORIENTATION: MID

## 2020-09-02 ASSESSMENT — PAIN DESCRIPTION - PAIN TYPE
TYPE: ACUTE PAIN;CHRONIC PAIN
TYPE: ACUTE PAIN
TYPE: ACUTE PAIN

## 2020-09-02 ASSESSMENT — PAIN DESCRIPTION - ONSET
ONSET: ON-GOING

## 2020-09-02 ASSESSMENT — PAIN DESCRIPTION - PROGRESSION
CLINICAL_PROGRESSION: NOT CHANGED

## 2020-09-02 ASSESSMENT — PAIN DESCRIPTION - FREQUENCY
FREQUENCY: INTERMITTENT

## 2020-09-02 ASSESSMENT — PAIN DESCRIPTION - LOCATION
LOCATION: ABDOMEN;BACK

## 2020-09-02 NOTE — CARE COORDINATION
For HIDA scan. Plan remains to return home on discharge w/ [facility]- will need C order on discharge.   Nolan Goodell

## 2020-09-02 NOTE — PROGRESS NOTES
[facility] Progress Note    Admitting Date and Time: 8/30/2020 11:57 PM  Admit Dx: DKA, type 1, not at goal ([address] [E10.10]  DKA, type 1, not at goal [facility]) [E10.10]    Subjective:  Patient is being followed for DKA, type 1, not at goal [facility]) [E10.10]  DKA, type 1, not at goal [facility]) [E10.10]   Pt reports 7/10 pain in back reports percocet helping her with pain, she also reports constipation x 1 week. Per RN: No major concerns voiced. ROS: denies fever, chills, cp, sob, n/v, HA unless stated above.      insulin glargine  15 Units Subcutaneous Nightly    mupirocin   Nasal Daily    pantoprazole  40 mg Oral BID AC    sodium chloride flush  10 mL Intravenous 2 times per day    enoxaparin  40 mg Subcutaneous Daily    insulin lispro  0-6 Units Subcutaneous TID WC    insulin lispro  0-3 Units Subcutaneous Nightly    insulin lispro  4 Units Subcutaneous TID WC     oxyCODONE-acetaminophen, 1 tablet, Q8H PRN  glucose, 15 g, PRN  dextrose, 12.5 g, PRN  glucagon (rDNA), 1 mg, PRN  dextrose, 100 mL/hr, PRN  sodium chloride flush, 10 mL, PRN  acetaminophen, 650 mg, Q6H PRN    Or  acetaminophen, 650 mg, Q6H PRN  magnesium hydroxide, 30 mL, Daily PRN  promethazine, 12.5 mg, Q6H PRN    Or  ondansetron, 4 mg, Q6H PRN  dextrose, 12.5 g, PRN  ketorolac, 15 mg, Q6H PRN         Objective:    BP (!) 126/93   Pulse 106   Temp 97.9 °F (36.6 °C) (Oral)   Resp 20   Ht 5' 1\" (1.549 m)   Wt 87 lb 11.2 oz (39.8 kg)   SpO2 99%   BMI 16.57 kg/m²     General Appearance: alert and oriented to person, place and time and in no acute distress  Skin: warm and dry  Head: normocephalic and atraumatic  Eyes: pupils equal, round, and reactive to light, extraocular eye movements intact, conjunctivae normal  Neck: neck supple and non tender without mass   Pulmonary/Chest: clear to auscultation bilaterally- no wheezes, rales or rhonchi, normal air movement, no respiratory distress  Cardiovascular: normal rate, normal S1 and normal excretion of the contrast by the kidneys. Aorta and IVC appear unremarkable. The Uterus and ovaries have unremarkable appearance. The the cecum is in low position in the right lower pelvic cavity, the appendix is identified and appears intrinsically unremarkable. Lower lung bases demonstrate no significant findings are. The Visualized bone structures are of unremarkable appearance. The     No acute inflammatory changes omental mesenteric fat planes, free intraperitoneal air, ascites or bowel obstruction. No obstructive uropathy. No dilatation of the mesentery of the system. There is unremarkable appearance for the appendix. Xr Chest Portable    Result Date: 2020  Patient MRN:  [id number] : 1995 Age: 27 years Gender: Female Order Date:  2020 2:00 AM TECHNIQUE/NUMBER OF IMAGES/COMPARISON/CLINICAL HISTORY: Chest AP 1 image one view Comparison  Clinical history is cough. FINDINGS: Lungs are clear, no infiltrates, consolidation perfusion seen. Heart has normal size, mediastinum unremarkable. There is no perihilar vascular congestion. There is some air distention of the stomach in a time the present study. No acute cardiopulmonary process. Assessment:    Principal Problem:    DKA, type 1 (HCC)  Active Problems:    Diabetes mellitus type 1, uncontrolled ([facility])    Leukocytosis    Severe dehydration    Diabetic ketoacidosis without coma associated with diabetes mellitus due to underlying condition (HCC)    Severe protein-calorie malnutrition (HCC)    Lactic acid acidosis    DKA, type 1, not at goal [facility])  Resolved Problems:    * No resolved hospital problems. *      Plan:  1. DKA, type I-  Patient noncompliant, missed her appointement twith Dr Afshan Rodriguez, was recently started on insulin pump for her Uncontrolled diabetes.  Patient numerous admissions at least once every month for DKA   Initial labs in the ER revealed a blood sugar of 702, anion gap of 32 with a bicarb of 3.  She was given IV fluids and started on an insulin drip per DKA protocol. Given her severe acidosis was given 100 mEq of sodium bicarb. · On lantus 20 units, Humalog 4 units with meals ( held for now since sugars running on lower side), Low dose ISS  · Monitor electrolytes  · IV fluid hydration  · Endo on board     2. Severe dehydration     3. SIRS- Leukocytosis, Lactic acidosis, Tachypnea, Tacyhcardia,  ID  was consulted for possible ongoing infection, source unknown. CT of the abdomen and pelvis was done which basically was normal there was no acute inflammatory changes no free intraperitoneal air no ascites or bowel obstruction.  No obstructive uropathy. No dilatation of the mesentery of the systems.  An unremarkable appearance of the appendix.    Patient had a chest x-ray which was also clear. Earma Arn was also clear. MRSA colonization added bactroban. Follow bld cultures, procal elevated- 1.13,  watch off abxs for now.     4. Chronic abdo pain x 1 month -  Possible gastroparesis, lipase, amylase WNL. Gastric emptying study pending. Possible EGD. R/o epidural abscess- CT thoracic, lumbar and scaral region not suggestive of fracture or malalignment, GI on board planning possible HIDA, ( she reports getting MRI and EGD recently at [facility] ) added percocet prn.    5. Constipation x 1 week, added colace.      Code Status: Full   DVT prophylaxis: Lovenox   Diet: carb control diet      - for [facility], patient has numerous admissions at least once every month for DKA. Has a endocrine f/u appt on wed 9/23 at 8 am.        NOTE: This report was transcribed using voice recognition software. Every effort was made to ensure accuracy; however, inadvertent computerized transcription errors may be present.   Electronically signed by Princess Salomon MD on 9/2/2020 at 11:31 AM

## 2020-09-02 NOTE — PROGRESS NOTES
ID Progress Note                [address] [facility], [place], [address]            Phone [phone]     Fax [phone]      Chief complaint   Abdominal pain and back pain    Subjective:  Eating solid diet, no acute issues  The patient is awake and alert. Tolerating medications. Reports no side effects. Afebrile. 10 ROS otherwise negative unless otherwise specified above. Objective:    Vitals:    09/02/20 1017   BP: (!) 126/93   Pulse: 106   Resp: 20   Temp: 97.9 °F (36.6 °C)   SpO2: 99%     VENT SETTINGS:   Vent Information  SpO2: 99 %  General Appearance:    Awake, alert , no acute distress. HEENT:    Normocephalic,PERRL,neck supple, no JVD, mucosa moist, no thrush   Lungs:     Clear to auscultation bilaterally, no wheeze , crackles   Heart:    Regular rate and rhythm, no murmur   Abdomen:     Soft, tender right upper quadrant, guarding or rigidity, bowel sounds present   Extremities:   lower thoracic and lumbar spine tenderness   Skin:   no rashes or lesions     Labs:  Recent Labs     08/31/20  0645 09/01/20  0558 09/02/20  1305   WBC 25.0* 11.2 6.0   RBC 4.11 3.89 4.28   HGB 11.6 10.8* 12.0   HCT 35.1 31.5* 34.9   MCV 85.4 81.0 81.5   MCH 28.2 27.8 28.0   MCHC 33.0 34.3 34.4   RDW 13.0 13.2 12.9    253 240   MPV 11.6 11.2 11.2     CMP:    Lab Results   Component Value Date     09/01/2020    K 4.0 09/01/2020    K 4.7 07/12/2020     09/01/2020    CO2 19 09/01/2020    BUN 8 09/01/2020    CREATININE 0.6 09/01/2020    GFRAA >60 09/01/2020    LABGLOM >60 09/01/2020    GLUCOSE 198 09/01/2020    PROT 6.2 09/01/2020    LABALBU 3.5 09/01/2020    CALCIUM 9.0 09/01/2020    BILITOT 0.3 09/01/2020    ALKPHOS 60 09/01/2020    AST 19 09/01/2020    ALT 12 09/01/2020          Microbiology :  Recent Labs     08/31/20  0145   BC 24 Hours no growth     Recent Labs     08/31/20  0239   BLOODCULT2 24 Hours no growth     No results for input(s): LABURIN in the last 72 hours.   No results for input(s): CULTRESP in the last 72 hours. No results for input(s): WNDABS in the last 72 hours. Radiology :  CT THORACIC SPINE W CONTRAST   Final Result   Addendum 1 of 1   The study was performed with IV contrast.      Final      CT LUMBAR SPINE W CONTRAST   Final Result      NO ACUTE FRACTURE OR SIGNIFICANT SUBLUXATION IS IDENTIFIED. XR CHEST PORTABLE   Final Result   No acute cardiopulmonary process. CT ABDOMEN PELVIS W IV CONTRAST Additional Contrast? None   Final Result   No acute inflammatory changes omental mesenteric fat planes, free   intraperitoneal air, ascites or bowel obstruction. No obstructive uropathy. No dilatation of the mesentery of the system. There is unremarkable appearance for the appendix.       NM GASTRIC EMPTYING    (Results Pending)     Assessment and Plan:      · Leukocytosis-currently looks like mostly reactive from nausea vomiting  · Chronic abdominal pain-normal lipase level, CT abdomen unremarkable, questionable gastroparesis  · Uncontrolled diabetes/DKA  · History of trichomoniasis     Plan  -Continue to watch her off antibiotics and rest of the DKA management as per the ICU team  -Follow blood cultures-negative so far  -Patient has been complaining of persistent lower back pain with spinal tenderness, rule out epidural abscess (several skin lesions some old and healed)-CT lumbar and thoracic spine not consistent with epidural abscess or osteomyelitis    ID signing off please call for any questions        Electronically signed by Allie Catherine MD on 9/2/2020 at 2:09 PM

## 2020-09-02 NOTE — PROGRESS NOTES
ENDOCRINOLOGY PROGRESS NOTE      Date of Admission: 8/30/2020  Date of service: 9/2/2020  Admitting Physician: Eliza Stewart MD   Primary Care Physician: Derryl Snellen, DO  Consultant physician: Levy Mukherjee MD     Reason for the consultation:  Poorly controlled DM type 1 admitted with DKA     History of Present Illness: The history is provided by the patient. Accuracy of the patient data is excellent    Ame Gotti is a very pleasant 27 y.o. old female with PMH of poorly controlled type 1 DM admitted to [facility] on 8/30/2020 with with vomiting and found to be in DKA, endocrine team was consulted for diabetes management.     Subjective:  Seen and examined this morning, BS dropped to 55 last night, feels better this morning     Inpatient diet:   Carb Restricted diet     Point of care glucose monitoring:   Independently reviewed   Recent Labs     08/31/20  2103 09/01/20  0730 09/01/20  1115 09/01/20  1239 09/01/20  1628 09/01/20  2036 09/02/20  0642 09/02/20  0703   GLUMET 233* 175* 79 114* 171* 247* 55* 119*         Allergies and Drug reactions  No Known Allergies    Scheduled Meds:   insulin glargine  16 Units Subcutaneous Nightly    mupirocin   Nasal Daily    pantoprazole  40 mg Oral BID AC    sodium chloride flush  10 mL Intravenous 2 times per day    enoxaparin  40 mg Subcutaneous Daily    insulin lispro  0-6 Units Subcutaneous TID WC    insulin lispro  0-3 Units Subcutaneous Nightly    insulin lispro  4 Units Subcutaneous TID WC     PRN Meds:   oxyCODONE-acetaminophen, 1 tablet, Q8H PRN  glucose, 15 g, PRN  dextrose, 12.5 g, PRN  glucagon (rDNA), 1 mg, PRN  dextrose, 100 mL/hr, PRN  sodium chloride flush, 10 mL, PRN  acetaminophen, 650 mg, Q6H PRN    Or  acetaminophen, 650 mg, Q6H PRN  magnesium hydroxide, 30 mL, Daily PRN  promethazine, 12.5 mg, Q6H PRN    Or  ondansetron, 4 mg, Q6H PRN  dextrose, 12.5 g, PRN  ketorolac, 15 mg, Q6H PRN      Continuous Infusions:   dextrose         Review of < > 137 140 135 138   K 5.3*   < > 3.9 4.3 4.2 4.0   CL 98   < > 109* 114* 108* 108*   CO2 3*   < > 7* 14* 18* 19*   BUN 17   < > 14 9 8 8   CREATININE 0.7   < > 0.5 0.5 0.5 0.6   GLUCOSE 702*   < > 268* 179* 187* 198*   CALCIUM 10.5*   < > 8.7 9.0 9.0 9.0   PROT 9.0*  --  6.8  --   --  6.2*   LABALBU 4.8  --  3.9  --   --  3.5   BILITOT 0.2  --  <0.2  --   --  0.3   ALKPHOS 100  --  64  --   --  60   AST 21  --  22  --   --  19   ALT 21  --  17  --   --  12    < > = values in this interval not displayed. Beta-Hydroxybutyrate   Date Value Ref Range Status   08/31/2020 >4.50 (H) 0.02 - 0.27 mmol/L Final   07/12/2020 >4.50 (H) 0.02 - 0.27 mmol/L Final   06/29/2020 >4.50 (H) 0.02 - 0.27 mmol/L Final     Lab Results   Component Value Date    LABA1C 13.0 05/30/2020    LABA1C 15.9 03/09/2020    LABA1C 14.2 08/06/2019     No results found for: TSH, T4FREE, D6LJDSD, FT3, N2WRXFP, TSI, TPOABS, THGAB  Lab Results   Component Value Date    LABA1C 13.0 05/30/2020    GLUCOSE 198 09/01/2020    MALBCR 204.6 01/23/2018    LABMICR 102.3 01/23/2018    LABCREA 50 01/23/2018     Lab Results   Component Value Date    CHOL 175 01/24/2018    TRIG 106 01/24/2018    HDL 40 01/24/2018       Blood culture   Lab Results   Component Value Date    BC 24 Hours no growth 08/31/2020    BC 5 Days no growth 07/13/2020       Radiology:  CT THORACIC SPINE W CONTRAST   Final Result   Addendum 1 of 1   The study was performed with IV contrast.      Final      CT LUMBAR SPINE W CONTRAST   Final Result      NO ACUTE FRACTURE OR SIGNIFICANT SUBLUXATION IS IDENTIFIED. XR CHEST PORTABLE   Final Result   No acute cardiopulmonary process. CT ABDOMEN PELVIS W IV CONTRAST Additional Contrast? None   Final Result   No acute inflammatory changes omental mesenteric fat planes, free   intraperitoneal air, ascites or bowel obstruction. No obstructive uropathy. No dilatation of the mesentery of the system.       There is unremarkable appearance for the appendix. NM GASTRIC EMPTYING    (Results Pending)       Medical Records/Labs/Images review:   I personally reviewed and summarized previous records   All labs and imaging were reviewed independently     Bob Espinoza, a 27 y.o.-old female seen in the hospital today for diabetes management     DM1 admitted with DKA   · Patient's DM is poorly controlled  due to poor compliance with insulin therapy, BS checking and follow up visits   · She recently completed insulin pump training and to start pump very soon   · Continue current DM regimen   · Lantus 15 units nightly   · Humalog 4 units with meals   · Low dose sliding scale   · Glucose check before meals and at bed time   · Will titrate insulin dose based on blood glucose trend & insulin requirement  · Pt will follow with us shortly after discharge. Endocrine follow up appointment Wednesday 9/23 at 8am     Noncompliance   I have discussed with her the great importance of following the treatment plan exactly as directed in order to achieve a good medical outcome.  Discussed with patient the importance of eating consistent carbohydrate meals, avoiding high glycemic index food. Also, discussed with patient the risk and negative consequences of dietary noncompliance on blood glucose control, blood pressure and weight    The above issues were reviewed with the patient who understood and agreed with the plan. Thank you for allowing us to participate in the care of this patient. Please do not hesitate to contact us with any additional questions. Blanca Cui MD  Endocrinologist, [facility]   [address] [place] [address]   Phone: [phone]  Fax: [phone]  --------------------------------------------  An electronic signature was used to authenticate this note.  Alexander Medrano MD on 9/2/2020 at 10:12 AM

## 2020-09-02 NOTE — PROGRESS NOTES
Reason for consult:  Uncontrolled type 1 DM/DKA     A1C: 13% (5/30/20)     [] Not available                 Patient states the following concerns/barriers to diabetes self-management:       [x] None       [] Medication cost   [] Food cost/availability          [] Reading  [] Hearing   [] Vision                  [] Work    [] Transportation  [] No insurance    [] Physical limitations    [] Other:              Diabetes survival packet provided to:   [x] Patient     [] Other:    Information reviewed:   Definition of diabetes   Target glucose ranges/A1C   Self-monitoring of blood glucose   Prevention/symptoms/treatment of hypo-/hyperglycemia   Medication adherence   The plate method/meal planning guidelines   The benefits of exercise and recommendations   Reducing the risk of chronic complications          Patient states the following:     [x]  Wore her new Medtronic insulin pump/Dexcom CGM for one week and loved it, but later found her catheter was kinked inside her      [x]  Insulin pump was advised to be discontinued temporarily while patient was receiving MRI testing for back/abdominal pain               [x]  She has been without her insulin pump for two weeks and giving insulin injections/using glucose meter instead    [x]  She plans on resuming insulin pump when ordered to do so, and has all needed supplies at home     [x]  She is aware of follow-up appointment with Dr. Ana Rivera on 9/23               [x]  She will attend outpatient diabetes education to meet with a dietitian to update meal plan/review carbohydrate counting       Evaluation/Plan/Recommendations:   Patient's understanding of diabetes:   []Poor   [x]Fair    [x]Good   [] Excellent     Outpatient diabetes education is recommended:   [x] Yes  [] No   [] As needed  Patient is interested in outpatient diabetes education:   [x] Yes    [] No    [] Unsure    Recommended:     [] Consult to social work; patient has no insurance or financial hardship      [x] Follow up with Dr. Wanda Moreno and keep all doctor appointments                [x] Script for outpatient diabetes education classes    [x] Carbohydrate-controlled diet      Thank you for this consult.     Radha Dickinson, RN, BSN, [facility] Velez

## 2020-09-02 NOTE — PROGRESS NOTES
PROGRESS NOTE    Patient Presents with/Seen in Consultation For      *gastroparesis   CHIEF COMPLAINT:  abd pain, back pain, n/v, and inability to eat/drink    Subjective:     Patient reports aching abdominal pain, diffuse rated 10/10. She states she did not eat today due to abdominal pain. Patient denies nausea or vomiting. Patient states last BM was 1 week ago. Pt reports + flatus. Review of Systems  Aside from what was mentioned in the PMH and HPI, essentially unremarkable, all others negative. Objective:     BP (!) 126/93   Pulse 106   Temp 97.9 °F (36.6 °C) (Oral)   Resp 20   Ht 5' 1\" (1.549 m)   Wt 87 lb 11.2 oz (39.8 kg)   SpO2 99%   BMI 16.57 kg/m²     General appearance: alert, awake, thin, laying in bed holding abdomen, and cooperative  Eyes: conjunctiva pale, sclera anicteric. PERRL.   Lungs: clear to auscultation bilaterally  Heart: regular rate and rhythm, no murmur, 2+ pulses; no edema  Abdomen: soft, non-tender; bowel sounds normal; no masses,  no organomegaly  Extremities: extremities without edema  Pulses: 2+ and symmetric  Skin: Skin color, texture, turgor normal.   Neurologic: Grossly normal    insulin glargine (LANTUS) injection vial 15 Units, Nightly  docusate sodium (COLACE) capsule 100 mg, BID  bisacodyl (DULCOLAX) suppository 10 mg, Once  mineral oil enema 1 enema, Once  mupirocin (BACTROBAN) 2 % ointment, Daily  pantoprazole (PROTONIX) tablet 40 mg, BID AC  oxyCODONE-acetaminophen (PERCOCET) 5-325 MG per tablet 1 tablet, Q8H PRN  glucose (GLUTOSE) 40 % oral gel 15 g, PRN  dextrose 50 % IV solution, PRN  glucagon (rDNA) injection 1 mg, PRN  dextrose 5 % solution, PRN  sodium chloride flush 0.9 % injection 10 mL, 2 times per day  sodium chloride flush 0.9 % injection 10 mL, PRN  acetaminophen (TYLENOL) tablet 650 mg, Q6H PRN    Or  acetaminophen (TYLENOL) suppository 650 mg, Q6H PRN  magnesium hydroxide (MILK OF MAGNESIA) 400 MG/5ML suspension 30 mL, Daily PRN  promethazine (PHENERGAN) tablet 12.5 mg, Q6H PRN    Or  ondansetron (ZOFRAN) injection 4 mg, Q6H PRN  enoxaparin (LOVENOX) injection 40 mg, Daily  dextrose 50 % IV solution, PRN  ketorolac (TORADOL) injection 15 mg, Q6H PRN  insulin lispro (HUMALOG) injection vial 0-6 Units, TID WC  insulin lispro (HUMALOG) injection vial 0-3 Units, Nightly  insulin lispro (HUMALOG) injection vial 4 Units, TID WC         Data Review  CBC:   Lab Results   Component Value Date    WBC 11.2 09/01/2020    RBC 3.89 09/01/2020    HGB 10.8 09/01/2020    HCT 31.5 09/01/2020    MCV 81.0 09/01/2020    MCH 27.8 09/01/2020    MCHC 34.3 09/01/2020    RDW 13.2 09/01/2020     09/01/2020    MPV 11.2 09/01/2020     CMP:    Lab Results   Component Value Date     09/01/2020    K 4.0 09/01/2020    K 4.7 07/12/2020     09/01/2020    CO2 19 09/01/2020    BUN 8 09/01/2020    CREATININE 0.6 09/01/2020    GFRAA >60 09/01/2020    LABGLOM >60 09/01/2020    GLUCOSE 198 09/01/2020    PROT 6.2 09/01/2020    LABALBU 3.5 09/01/2020    CALCIUM 9.0 09/01/2020    BILITOT 0.3 09/01/2020    ALKPHOS 60 09/01/2020    AST 19 09/01/2020    ALT 12 09/01/2020     Hepatic Function Panel:    Lab Results   Component Value Date    ALKPHOS 60 09/01/2020    ALT 12 09/01/2020    AST 19 09/01/2020    PROT 6.2 09/01/2020    BILITOT 0.3 09/01/2020    BILIDIR <0.2 09/01/2020    IBILI see below 09/01/2020    LABALBU 3.5 09/01/2020     No components found for: CHLPL    Lab Results   Component Value Date    TRIG 106 01/24/2018       Lab Results   Component Value Date    HDL 40 01/24/2018       Lab Results   Component Value Date    LDLCALC 114 (H) 01/24/2018       Lab Results   Component Value Date    LABVLDL 21 01/24/2018      PT/INR:    Lab Results   Component Value Date    PROTIME 11.6 02/10/2019    INR 1.0 02/10/2019     IRON:    Lab Results   Component Value Date    IRON 37 01/23/2018     Iron Saturation:  No components found for: PERCENTFE  FERRITIN:    Lab Results   Component Value Date    FERRITIN 32 01/23/2018         Assessment:     Principal Problem:    DKA, type 1 ([address]  Active Problems:  ? Abdominal pain, diffuse radiating to back  ? Nausea and vomiting -improved  ? DKA, resolved-defer to endocrinology  ? Anemia, normocytic, normochromic  ? Back pain-defer to PCP  ? Leukocytosis, resolved  ? Constipation, no BM x1 week    Plan:     ? Gastric empty and process, await results  ? Porphyria panel as ordered  ? Dulcolax RS x1 now followed by mineral oil enema 1/2-hour later  ? Colace 200 mg at at bedtime as ordered  ? Continue pantoprazole as ordered  ? Medicate for pain and nausea as ordered per PCP  ? Defer comorbidities to others  ? Continue to monitor    Note: This report was completed utilizing computer voice recognition software. Every effort has been made to ensure accuracy, however; inadvertent computerized transcription errors may be present.      Discussed with Dr. Fiordaliza Jacobs per Dr. Romayne Merino [id number], FNP-BC 9/2/2020 11:58 AM For Dr. Manisha Romero

## 2020-09-03 ENCOUNTER — APPOINTMENT (OUTPATIENT)
Dept: MRI IMAGING | Age: 25
DRG: 420 | End: 2020-09-03
Payer: COMMERCIAL

## 2020-09-03 LAB
ALBUMIN SERPL-MCNC: 4.3 G/DL (ref 3.5–5.2)
ALP BLD-CCNC: 69 U/L (ref 35–104)
ALT SERPL-CCNC: 13 U/L (ref 0–32)
AST SERPL-CCNC: 23 U/L (ref 0–31)
BASOPHILS ABSOLUTE: 0.02 E9/L (ref 0–0.2)
BASOPHILS RELATIVE PERCENT: 0.4 % (ref 0–2)
BILIRUB SERPL-MCNC: 0.5 MG/DL (ref 0–1.2)
BILIRUBIN DIRECT: <0.2 MG/DL (ref 0–0.3)
BILIRUBIN, INDIRECT: NORMAL MG/DL (ref 0–1)
EOSINOPHILS ABSOLUTE: 0.03 E9/L (ref 0.05–0.5)
EOSINOPHILS RELATIVE PERCENT: 0.6 % (ref 0–6)
HCT VFR BLD CALC: 41.3 % (ref 34–48)
HEMOGLOBIN: 13.7 G/DL (ref 11.5–15.5)
IMMATURE GRANULOCYTES #: 0.01 E9/L
IMMATURE GRANULOCYTES %: 0.2 % (ref 0–5)
LYMPHOCYTES ABSOLUTE: 2.2 E9/L (ref 1.5–4)
LYMPHOCYTES RELATIVE PERCENT: 47.5 % (ref 20–42)
MCH RBC QN AUTO: 27.5 PG (ref 26–35)
MCHC RBC AUTO-ENTMCNC: 33.2 % (ref 32–34.5)
MCV RBC AUTO: 82.8 FL (ref 80–99.9)
METER GLUCOSE: 113 MG/DL (ref 74–99)
METER GLUCOSE: 223 MG/DL (ref 74–99)
METER GLUCOSE: 288 MG/DL (ref 74–99)
METER GLUCOSE: 418 MG/DL (ref 74–99)
MONOCYTES ABSOLUTE: 0.25 E9/L (ref 0.1–0.95)
MONOCYTES RELATIVE PERCENT: 5.4 % (ref 2–12)
NEUTROPHILS ABSOLUTE: 2.12 E9/L (ref 1.8–7.3)
NEUTROPHILS RELATIVE PERCENT: 45.9 % (ref 43–80)
PDW BLD-RTO: 12.7 FL (ref 11.5–15)
PLATELET # BLD: 275 E9/L (ref 130–450)
PMV BLD AUTO: 10.7 FL (ref 7–12)
RBC # BLD: 4.99 E12/L (ref 3.5–5.5)
TOTAL PROTEIN: 8 G/DL (ref 6.4–8.3)
WBC # BLD: 4.6 E9/L (ref 4.5–11.5)

## 2020-09-03 PROCEDURE — 36415 COLL VENOUS BLD VENIPUNCTURE: CPT

## 2020-09-03 PROCEDURE — 6370000000 HC RX 637 (ALT 250 FOR IP): Performed by: INTERNAL MEDICINE

## 2020-09-03 PROCEDURE — 74183 MRI ABD W/O CNTR FLWD CNTR: CPT

## 2020-09-03 PROCEDURE — 6370000000 HC RX 637 (ALT 250 FOR IP): Performed by: STUDENT IN AN ORGANIZED HEALTH CARE EDUCATION/TRAINING PROGRAM

## 2020-09-03 PROCEDURE — 82962 GLUCOSE BLOOD TEST: CPT

## 2020-09-03 PROCEDURE — 80076 HEPATIC FUNCTION PANEL: CPT

## 2020-09-03 PROCEDURE — 6360000004 HC RX CONTRAST MEDICATION: Performed by: RADIOLOGY

## 2020-09-03 PROCEDURE — 6360000002 HC RX W HCPCS: Performed by: INTERNAL MEDICINE

## 2020-09-03 PROCEDURE — A9579 GAD-BASE MR CONTRAST NOS,1ML: HCPCS | Performed by: RADIOLOGY

## 2020-09-03 PROCEDURE — 99232 SBSQ HOSP IP/OBS MODERATE 35: CPT | Performed by: STUDENT IN AN ORGANIZED HEALTH CARE EDUCATION/TRAINING PROGRAM

## 2020-09-03 PROCEDURE — 2580000003 HC RX 258: Performed by: INTERNAL MEDICINE

## 2020-09-03 PROCEDURE — 1200000000 HC SEMI PRIVATE

## 2020-09-03 PROCEDURE — 99232 SBSQ HOSP IP/OBS MODERATE 35: CPT | Performed by: INTERNAL MEDICINE

## 2020-09-03 PROCEDURE — 85025 COMPLETE CBC W/AUTO DIFF WBC: CPT

## 2020-09-03 RX ORDER — OXYCODONE HYDROCHLORIDE AND ACETAMINOPHEN 5; 325 MG/1; MG/1
1 TABLET ORAL EVERY 6 HOURS PRN
Status: DISCONTINUED | OUTPATIENT
Start: 2020-09-03 | End: 2020-09-04

## 2020-09-03 RX ADMIN — DOCUSATE SODIUM 100 MG: 100 CAPSULE, LIQUID FILLED ORAL at 20:38

## 2020-09-03 RX ADMIN — MAGNESIUM CITRATE 296 ML: 1.75 LIQUID ORAL at 13:12

## 2020-09-03 RX ADMIN — KETOROLAC TROMETHAMINE 15 MG: 30 INJECTION, SOLUTION INTRAMUSCULAR at 16:20

## 2020-09-03 RX ADMIN — INSULIN LISPRO 4 UNITS: 100 INJECTION, SOLUTION INTRAVENOUS; SUBCUTANEOUS at 17:50

## 2020-09-03 RX ADMIN — INSULIN GLARGINE 15 UNITS: 100 INJECTION, SOLUTION SUBCUTANEOUS at 20:07

## 2020-09-03 RX ADMIN — MAGNESIUM HYDROXIDE 30 ML: 400 SUSPENSION ORAL at 23:59

## 2020-09-03 RX ADMIN — OXYCODONE HYDROCHLORIDE AND ACETAMINOPHEN 1 TABLET: 5; 325 TABLET ORAL at 02:54

## 2020-09-03 RX ADMIN — Medication 10 ML: at 09:05

## 2020-09-03 RX ADMIN — GADOTERIDOL 8 ML: 279.3 INJECTION, SOLUTION INTRAVENOUS at 11:13

## 2020-09-03 RX ADMIN — OXYCODONE HYDROCHLORIDE AND ACETAMINOPHEN 1 TABLET: 5; 325 TABLET ORAL at 23:56

## 2020-09-03 RX ADMIN — OXYCODONE HYDROCHLORIDE AND ACETAMINOPHEN 1 TABLET: 5; 325 TABLET ORAL at 17:50

## 2020-09-03 RX ADMIN — INSULIN LISPRO 4 UNITS: 100 INJECTION, SOLUTION INTRAVENOUS; SUBCUTANEOUS at 09:05

## 2020-09-03 RX ADMIN — Medication 10 ML: at 20:38

## 2020-09-03 RX ADMIN — MUPIROCIN: 20 OINTMENT TOPICAL at 09:06

## 2020-09-03 RX ADMIN — INSULIN LISPRO 2 UNITS: 100 INJECTION, SOLUTION INTRAVENOUS; SUBCUTANEOUS at 20:07

## 2020-09-03 RX ADMIN — OXYCODONE HYDROCHLORIDE AND ACETAMINOPHEN 1 TABLET: 5; 325 TABLET ORAL at 11:33

## 2020-09-03 RX ADMIN — INSULIN LISPRO 6 UNITS: 100 INJECTION, SOLUTION INTRAVENOUS; SUBCUTANEOUS at 17:52

## 2020-09-03 ASSESSMENT — PAIN DESCRIPTION - PAIN TYPE
TYPE: ACUTE PAIN
TYPE: ACUTE PAIN

## 2020-09-03 ASSESSMENT — PAIN DESCRIPTION - PROGRESSION: CLINICAL_PROGRESSION: NOT CHANGED

## 2020-09-03 ASSESSMENT — PAIN SCALES - GENERAL
PAINLEVEL_OUTOF10: 10
PAINLEVEL_OUTOF10: 8
PAINLEVEL_OUTOF10: 0
PAINLEVEL_OUTOF10: 10
PAINLEVEL_OUTOF10: 10
PAINLEVEL_OUTOF10: 0
PAINLEVEL_OUTOF10: 10
PAINLEVEL_OUTOF10: 10

## 2020-09-03 ASSESSMENT — PAIN DESCRIPTION - FREQUENCY: FREQUENCY: INTERMITTENT

## 2020-09-03 ASSESSMENT — PAIN DESCRIPTION - LOCATION
LOCATION: ABDOMEN
LOCATION: ABDOMEN;BACK

## 2020-09-03 ASSESSMENT — PAIN - FUNCTIONAL ASSESSMENT: PAIN_FUNCTIONAL_ASSESSMENT: ACTIVITIES ARE NOT PREVENTED

## 2020-09-03 ASSESSMENT — PAIN DESCRIPTION - ONSET: ONSET: ON-GOING

## 2020-09-03 ASSESSMENT — PAIN DESCRIPTION - DESCRIPTORS: DESCRIPTORS: ACHING;CRAMPING;DISCOMFORT

## 2020-09-03 ASSESSMENT — PAIN DESCRIPTION - ORIENTATION: ORIENTATION: MID

## 2020-09-03 NOTE — PROGRESS NOTES
Call placed to Dr Michael Garcia per patient request for increased pain medication. New orders placed.

## 2020-09-03 NOTE — PROGRESS NOTES
PROGRESS NOTE    Patient Presents with/Seen in Consultation For      *gastroparesis   CHIEF COMPLAINT:  abd pain, back pain, n/v, and inability to eat/drink    Subjective:     Patient states she feels a little bit better today stating her diffuse aching abd pain is 8/10 post pain medication. Pt states she is passing flatus, denies BM despite suppository and colace. States she refused enema but agrees to take today. Pt denies n/v. Asking to eat. Review of Systems  Aside from what was mentioned in the PMH and HPI, essentially unremarkable, all others negative. Objective:     /85   Pulse 97   Temp 98.2 °F (36.8 °C) (Oral)   Resp 18   Ht 5' 1\" (1.549 m)   Wt 87 lb 11.2 oz (39.8 kg)   SpO2 97%   BMI 16.57 kg/m²     General appearance: alert, awake, thin, laying in bed holding heating pad on abdomen with lights off in room, and cooperative  Eyes: conjunctiva pale, sclera anicteric. PERRL.   Lungs: clear to auscultation bilaterally  Heart: regular rate and rhythm, no murmur, 2+ pulses; no edema  Abdomen: soft, non-tender; bowel sounds normal; no masses,  no organomegaly  Extremities: extremities without edema  Pulses: 2+ and symmetric  Skin: Skin color, texture, turgor normal.   Neurologic: Grossly normal    insulin glargine (LANTUS) injection vial 15 Units, Nightly  docusate sodium (COLACE) capsule 100 mg, BID  mineral oil enema 1 enema, Once  mupirocin (BACTROBAN) 2 % ointment, Daily  pantoprazole (PROTONIX) tablet 40 mg, BID AC  oxyCODONE-acetaminophen (PERCOCET) 5-325 MG per tablet 1 tablet, Q8H PRN  glucose (GLUTOSE) 40 % oral gel 15 g, PRN  dextrose 50 % IV solution, PRN  glucagon (rDNA) injection 1 mg, PRN  dextrose 5 % solution, PRN  sodium chloride flush 0.9 % injection 10 mL, 2 times per day  sodium chloride flush 0.9 % injection 10 mL, PRN  acetaminophen (TYLENOL) tablet 650 mg, Q6H PRN    Or  acetaminophen (TYLENOL) suppository 650 mg, Q6H PRN  magnesium hydroxide (MILK OF MAGNESIA) 400 MG/5ML suspension 30 mL, Daily PRN  promethazine (PHENERGAN) tablet 12.5 mg, Q6H PRN    Or  ondansetron (ZOFRAN) injection 4 mg, Q6H PRN  enoxaparin (LOVENOX) injection 40 mg, Daily  dextrose 50 % IV solution, PRN  ketorolac (TORADOL) injection 15 mg, Q6H PRN  insulin lispro (HUMALOG) injection vial 0-6 Units, TID WC  insulin lispro (HUMALOG) injection vial 0-3 Units, Nightly  insulin lispro (HUMALOG) injection vial 4 Units, TID WC         Data Review  CBC:   Lab Results   Component Value Date    WBC 4.6 09/03/2020    RBC 4.99 09/03/2020    HGB 13.7 09/03/2020    HCT 41.3 09/03/2020    MCV 82.8 09/03/2020    MCH 27.5 09/03/2020    MCHC 33.2 09/03/2020    RDW 12.7 09/03/2020     09/03/2020    MPV 10.7 09/03/2020     CMP:    Lab Results   Component Value Date     09/01/2020    K 4.0 09/01/2020    K 4.7 07/12/2020     09/01/2020    CO2 19 09/01/2020    BUN 8 09/01/2020    CREATININE 0.6 09/01/2020    GFRAA >60 09/01/2020    LABGLOM >60 09/01/2020    GLUCOSE 198 09/01/2020    PROT 6.2 09/01/2020    LABALBU 3.5 09/01/2020    CALCIUM 9.0 09/01/2020    BILITOT 0.3 09/01/2020    ALKPHOS 60 09/01/2020    AST 19 09/01/2020    ALT 12 09/01/2020     Hepatic Function Panel:    Lab Results   Component Value Date    ALKPHOS 60 09/01/2020    ALT 12 09/01/2020    AST 19 09/01/2020    PROT 6.2 09/01/2020    BILITOT 0.3 09/01/2020    BILIDIR <0.2 09/01/2020    IBILI see below 09/01/2020    LABALBU 3.5 09/01/2020     No components found for: CHLPL    Lab Results   Component Value Date    TRIG 106 01/24/2018       Lab Results   Component Value Date    HDL 40 01/24/2018       Lab Results   Component Value Date    LDLCALC 114 (H) 01/24/2018       Lab Results   Component Value Date    LABVLDL 21 01/24/2018      PT/INR:    Lab Results   Component Value Date    PROTIME 11.6 02/10/2019    INR 1.0 02/10/2019     IRON:    Lab Results   Component Value Date    IRON 37 01/23/2018     Iron Saturation:  No components found for: PERCENTFE  FERRITIN:    Lab Results   Component Value Date    FERRITIN 32 01/23/2018         Assessment:     Principal Problem:    DKA, type 1 ([address]  Active Problems:  ? Abdominal pain, diffuse radiating to back  ? Nausea and vomiting -improved  ? DKA, resolved-defer to endocrinology  ? Anemia, normocytic, normochromic  ? Back pain-defer to PCP  ? Leukocytosis, resolved  ? Constipation, no BM x >1 week   ? Recent marijuana use    Plan:     ? Gastric empty negative  ? MRI pending  ? HIDA tomorrow  ? Mineral oil enema today  ? Mg Citrate one bottle now   ? Porphyria panel pending  ? Continue Colace 200 mg at at bedtime as ordered  ? Continue Pantoprazole as ordered  ? Medicate for pain and nausea as ordered per PCP  ? Defer comorbidities to others  ? Marijuana cessation - pt agrees  ? Pt to be scheduled for outpatient colonoscopy with Dr Rober Salguero post discharge. If MRI and HIDA negative, ok to discharge tomorrow with outpatient colon then follow up post colon. ? Continue to monitor      Note: This report was completed utilizing computer voice recognition software. Every effort has been made to ensure accuracy, however; inadvertent computerized transcription errors may be present.      Discussed with Dr. Ivette Mejia per Dr. Marco Antonio Lawson [id number], FNP-BC 9/3/2020 1:14 PM For Dr. Rober Salguero

## 2020-09-03 NOTE — PLAN OF CARE
Problem: Fluid Volume - Imbalance:  Goal: Will remain free of signs and symptoms of dehydration  Description: Will remain free of signs and symptoms of dehydration  Outcome: Met This Shift  Goal: Absence of imbalanced fluid volume signs and symptoms  Description: Absence of imbalanced fluid volume signs and symptoms  Outcome: Met This Shift

## 2020-09-03 NOTE — PROGRESS NOTES
no respiratory distress  Cardiovascular: normal rate, normal S1 and S2 and no carotid bruits  Abdomen: soft,  + epigastric tenderness, non-distended, normal bowel sounds, no masses or organomegaly  Extremities: no cyanosis, no clubbing and no edema  Neurologic: no cranial nerve deficit and speech normal, thoracic& lumbar tenderness+, SLR neg        Recent Labs     20  0558      K 4.0   *   CO2 19*   BUN 8   CREATININE 0.6   GLUCOSE 198*   CALCIUM 9.0       Recent Labs     20  0558 20  1305 20  1203   WBC 11.2 6.0 4.6   RBC 3.89 4.28 4.99   HGB 10.8* 12.0 13.7   HCT 31.5* 34.9 41.3   MCV 81.0 81.5 82.8   MCH 27.8 28.0 27.5   MCHC 34.3 34.4 33.2   RDW 13.2 12.9 12.7    240 275   MPV 11.2 11.2 10.7       Micro:  No components found for: [facility])    Radiology:   Ct Abdomen Pelvis W Iv Contrast Additional Contrast? None    Result Date: 2020  Patient MRN:  [id number] : 1995 Age: 27 years Gender: Female Order Date:  2020 1:08 AM TECHNIQUE/NUMBER OF IMAGES/COMPARISON/CLINICAL HISTORY: CT abdomen pelvis IV contrast After IV contrast axial images obtained sagittal and coronal reconstructions 3/4/2020 images IV contrast the findings are. 70 3. Patient Comparison the 2020. FINDINGS: There are normal size and enhancement for the liver, spleen and pancreas are. There is areas fluid distention with of the fundal region of the stomach. There is no indication for bowel obstruction. There is no bowel dilatation. The No free intraperitoneal seen. No indication for an acute inflammatory changes in the mesenteric fat planes. There are normal size and enhancement for the liver, spleen and pancreas. The Gallbladder is partially distended but otherwise appear unremarkable. The biliary tree and pancreatic ductal systems are not dilated. The Adrenals not enlarged. Kidneys have preserved size and cortical thickness and cortical enhancement.  There is normal excretion of the contrast by the kidneys. Aorta and IVC appear unremarkable. The Uterus and ovaries have unremarkable appearance. The the cecum is in low position in the right lower pelvic cavity, the appendix is identified and appears intrinsically unremarkable. Lower lung bases demonstrate no significant findings are. The Visualized bone structures are of unremarkable appearance. The     No acute inflammatory changes omental mesenteric fat planes, free intraperitoneal air, ascites or bowel obstruction. No obstructive uropathy. No dilatation of the mesentery of the system. There is unremarkable appearance for the appendix. Xr Chest Portable    Result Date: 2020  Patient MRN:  [id number] : 1995 Age: 27 years Gender: Female Order Date:  2020 2:00 AM TECHNIQUE/NUMBER OF IMAGES/COMPARISON/CLINICAL HISTORY: Chest AP 1 image one view Comparison  Clinical history is cough. FINDINGS: Lungs are clear, no infiltrates, consolidation perfusion seen. Heart has normal size, mediastinum unremarkable. There is no perihilar vascular congestion. There is some air distention of the stomach in a time the present study. No acute cardiopulmonary process. Assessment:    Principal Problem:    DKA, type 1 (HCC)  Active Problems:    Diabetes mellitus type 1, uncontrolled (HCC)    Leukocytosis    Severe dehydration    Diabetic ketoacidosis without coma associated with diabetes mellitus due to underlying condition (HCC)    Severe protein-calorie malnutrition (HCC)    Lactic acid acidosis    DKA, type 1, not at goal [facility])  Resolved Problems:    * No resolved hospital problems. *      Plan:  1. DKA, type I-  Patient noncompliant, missed her appointement twith Dr Amari Smallwood, was recently started on insulin pump for her Uncontrolled diabetes.  Patient numerous admissions at least once every month for DKA   Initial labs in the ER revealed a blood sugar of 702, anion gap of 32 with a bicarb of 3.  She was given IV fluids and started on an insulin drip per DKA protocol. Given her severe acidosis was given 100 mEq of sodium bicarb. · On lantus 20 units, Humalog 4 units with meals ( held for now since sugars running on lower side), Low dose ISS  · Monitor electrolytes  · IV fluid hydration  · Endo on board     2. Severe dehydration     3. SIRS- Leukocytosis, Lactic acidosis, Tachypnea, Tacyhcardia,  ID  was consulted for possible ongoing infection, source unknown. CT of the abdomen and pelvis was done which basically was normal there was no acute inflammatory changes no free intraperitoneal air no ascites or bowel obstruction.  No obstructive uropathy. No dilatation of the mesentery of the systems.  An unremarkable appearance of the appendix.    Patient had a chest x-ray which was also clear. Rach Angelo was also clear. MRSA colonization added bactroban. Follow bld cultures, procal elevated- 1.13,  watch off abxs for now.     4. Chronic abdo pain radiating to back x 1 month -  Possible gastroparesis, lipase, amylase WNL. Gastric emptying study negative. Porphyria panel pending. Ruled out epidural abscess- CT thoracic, lumbar and scaral region not suggestive of fracture or malalignment, GI on board, HIDA pending, (she reports getting MRI and EGD recently at [facility] ) Mri- Fatty change of liver with normal appearance of the gallbladder and   hepatobiliary tree, and no evidence of focal visceral abnormality added percocet prn.    5. Constipation x 1 week, added colace, mineral enema, mag citrate.      Code Status: Full   DVT prophylaxis: Lovenox   Diet: carb control diet      - for [facility], patient has numerous admissions at least once every month for DKA. Has a endocrine f/u appt on wed 9/23 at 8 am. Scheduled for outpatient colonoscopy with Dr Anne Beck post discharge. If MRI and HIDA negative, ok to discharge tomorrow       NOTE: This report was transcribed using voice recognition software.  Every effort was made to ensure accuracy; however, inadvertent computerized transcription errors may be present.   Electronically signed by Oscar Murray MD on 9/3/2020 at 4:31 PM

## 2020-09-03 NOTE — PROGRESS NOTES
ENDOCRINOLOGY PROGRESS NOTE      Date of Admission: 8/30/2020  Date of service: 9/3/2020  Admitting Physician: Luis Alberto Piedra MD   Primary Care Physician: Elena Dunham DO  Consultant physician: Loren Frazier MD     Reason for the consultation:  Poorly controlled DM type 1 admitted with DKA     History of Present Illness: The history is provided by the patient. Accuracy of the patient data is excellent    Angelika Larkin is a very pleasant 27 y.o. old female with PMH of poorly controlled type 1 DM admitted to [facility] on 8/30/2020 with with vomiting and found to be in DKA, endocrine team was consulted for diabetes management.     Subjective:  Seen and examined this morning, scheduled for MRI today for evaluation of low back pain and leukocytosis     Inpatient diet:   Carb Restricted diet     Point of care glucose monitoring:   Independently reviewed   Recent Labs     09/01/20  1628 09/01/20  2036 09/02/20  0642 09/02/20  0703 09/02/20  1120 09/02/20  1619 09/02/20 2021 09/03/20  0627   GLUMET 171* 247* 55* 119* 88 90 210* 223*         Allergies and Drug reactions  No Known Allergies    Scheduled Meds:   insulin glargine  15 Units Subcutaneous Nightly    docusate sodium  100 mg Oral BID    mineral oil  1 enema Rectal Once    mupirocin   Nasal Daily    pantoprazole  40 mg Oral BID AC    sodium chloride flush  10 mL Intravenous 2 times per day    enoxaparin  40 mg Subcutaneous Daily    insulin lispro  0-6 Units Subcutaneous TID WC    insulin lispro  0-3 Units Subcutaneous Nightly    insulin lispro  4 Units Subcutaneous TID WC     PRN Meds:   oxyCODONE-acetaminophen, 1 tablet, Q8H PRN  glucose, 15 g, PRN  dextrose, 12.5 g, PRN  glucagon (rDNA), 1 mg, PRN  dextrose, 100 mL/hr, PRN  sodium chloride flush, 10 mL, PRN  acetaminophen, 650 mg, Q6H PRN    Or  acetaminophen, 650 mg, Q6H PRN  magnesium hydroxide, 30 mL, Daily PRN  promethazine, 12.5 mg, Q6H PRN    Or  ondansetron, 4 mg, Q6H PRN  dextrose, 12.5 g, PRN  ketorolac, 15 mg, Q6H PRN      Continuous Infusions:   dextrose         Review of Systems  Constitutional: tired   HEENT: No blurred vision, No sore throat, no ear pain, no hair loss  Neck: denied any neck swelling, difficulty swallowing,   Cardio-pulmonary: No CP, SOB or palpitation, No orthopnea or PND. No cough or wheezing. GI: No N/V/D, no constipation, mild epigastric pain   : Denied any dysuria, hematuria, flank pain, discharge, or incontinence. Skin: denied any rash, ulcer, or hyperpigmentation. MSK: denied any joint deformity, joint pain/swelling, muscle pain, or back pain. Neuro: no numbness, no tingling, no weakness, _    OBJECTIVE    /85   Pulse 97   Temp 98.2 °F (36.8 °C) (Oral)   Resp 18   Ht 5' 1\" (1.549 m)   Wt 87 lb 11.2 oz (39.8 kg)   SpO2 97%   BMI 16.57 kg/m²   No intake or output data in the 24 hours ending 09/03/20 0813    Physical examination:  General: awake alert, oriented x3, no abnormal position or movements. HEENT: normocephalic non-traumatic, no exophthalmos   Neck: supple, no LN enlargement, no thyromegaly, no thyroid tenderness, no JVD. Pulm: Clear equal air entry no added sounds, no wheezing or rhonchi    CVS: S1 + S2, no murmur, no heave. Dorsalis pedis pulse palpable   Abd: soft lax, + epigastric tenderness, no organomegaly, audible bowel sounds. Skin: warm, no lesions, no rash.  No callus, no Ulcers, No acanthosis nigricans   Neuro: CN intact,  muscle power normal  Psych: normal mood, and affect    Review of Laboratory Data:  I personally reviewed the following labs:   Recent Labs     09/01/20  0558 09/02/20  1305   WBC 11.2 6.0   RBC 3.89 4.28   HGB 10.8* 12.0   HCT 31.5* 34.9   MCV 81.0 81.5   MCH 27.8 28.0   MCHC 34.3 34.4   RDW 13.2 12.9    240   MPV 11.2 11.2     Recent Labs     08/31/20  1124 08/31/20  1600 09/01/20  0558    135 138   K 4.3 4.2 4.0   * 108* 108*   CO2 14* 18* 19*   BUN 9 8 8   CREATININE 0.5 0.5 0.6   GLUCOSE and imaging were reviewed independently     Bob Espinoza, a 27 y.o.-old female seen in the hospital today for diabetes management     DM1 admitted with DKA   · Patient's DM is poorly controlled  due to poor compliance with insulin therapy, BS checking and follow up visits   · She recently completed insulin pump training and to start pump very soon   · Continue current DM regimen   · Lantus 15 units nightly   · Humalog 4 units with meals   · Low dose sliding scale   · Glucose check before meals and at bed time   · Will titrate insulin dose based on blood glucose trend & insulin requirement  · Pt will follow with us shortly after discharge. Endocrine follow up appointment Wednesday 9/23 at 8am     Noncompliance   I have discussed with her the great importance of following the treatment plan exactly as directed in order to achieve a good medical outcome.  Discussed with patient the importance of eating consistent carbohydrate meals, avoiding high glycemic index food. Also, discussed with patient the risk and negative consequences of dietary noncompliance on blood glucose control, blood pressure and weight    Low back pain, leukocytosis:  · Managed by primary team   · Schedule for MRI today to r/o epidural abscess       The above issues were reviewed with the patient who understood and agreed with the plan. Thank you for allowing us to participate in the care of this patient. Please do not hesitate to contact us with any additional questions. Guido Ann MD  Endocrinologist, [facility]   [address] [place] [address]   Phone: [phone]  Fax: [phone]  --------------------------------------------  An electronic signature was used to authenticate this note.  Rachele Sandifer, MD on 9/3/2020 at 8:13 AM

## 2020-09-04 ENCOUNTER — APPOINTMENT (OUTPATIENT)
Dept: NUCLEAR MEDICINE | Age: 25
DRG: 420 | End: 2020-09-04
Payer: COMMERCIAL

## 2020-09-04 ENCOUNTER — APPOINTMENT (OUTPATIENT)
Dept: GENERAL RADIOLOGY | Age: 25
DRG: 420 | End: 2020-09-04
Payer: COMMERCIAL

## 2020-09-04 VITALS
HEART RATE: 99 BPM | WEIGHT: 90.5 LBS | TEMPERATURE: 97.9 F | SYSTOLIC BLOOD PRESSURE: 122 MMHG | HEIGHT: 61 IN | OXYGEN SATURATION: 99 % | BODY MASS INDEX: 17.09 KG/M2 | DIASTOLIC BLOOD PRESSURE: 88 MMHG | RESPIRATION RATE: 18 BRPM

## 2020-09-04 LAB
ALBUMIN SERPL-MCNC: 3.9 G/DL (ref 3.5–5.2)
ALP BLD-CCNC: 59 U/L (ref 35–104)
ALT SERPL-CCNC: 14 U/L (ref 0–32)
ANION GAP SERPL CALCULATED.3IONS-SCNC: 8 MMOL/L (ref 7–16)
AST SERPL-CCNC: 19 U/L (ref 0–31)
BASOPHILS ABSOLUTE: 0.02 E9/L (ref 0–0.2)
BASOPHILS RELATIVE PERCENT: 0.4 % (ref 0–2)
BILIRUB SERPL-MCNC: 0.2 MG/DL (ref 0–1.2)
BILIRUBIN DIRECT: <0.2 MG/DL (ref 0–0.3)
BILIRUBIN, INDIRECT: NORMAL MG/DL (ref 0–1)
BUN BLDV-MCNC: 13 MG/DL (ref 6–20)
CALCIUM SERPL-MCNC: 9.6 MG/DL (ref 8.6–10.2)
CHLORIDE BLD-SCNC: 102 MMOL/L (ref 98–107)
CO2: 30 MMOL/L (ref 22–29)
CREAT SERPL-MCNC: 0.5 MG/DL (ref 0.5–1)
EOSINOPHILS ABSOLUTE: 0.04 E9/L (ref 0.05–0.5)
EOSINOPHILS RELATIVE PERCENT: 0.8 % (ref 0–6)
GFR AFRICAN AMERICAN: >60
GFR NON-AFRICAN AMERICAN: >60 ML/MIN/1.73
GLUCOSE BLD-MCNC: 50 MG/DL (ref 74–99)
HCT VFR BLD CALC: 36.2 % (ref 34–48)
HEMOGLOBIN: 12.3 G/DL (ref 11.5–15.5)
IMMATURE GRANULOCYTES #: 0 E9/L
IMMATURE GRANULOCYTES %: 0 % (ref 0–5)
LYMPHOCYTES ABSOLUTE: 3.05 E9/L (ref 1.5–4)
LYMPHOCYTES RELATIVE PERCENT: 61 % (ref 20–42)
MAGNESIUM: 2.2 MG/DL (ref 1.6–2.6)
MCH RBC QN AUTO: 28.1 PG (ref 26–35)
MCHC RBC AUTO-ENTMCNC: 34 % (ref 32–34.5)
MCV RBC AUTO: 82.8 FL (ref 80–99.9)
METER GLUCOSE: 104 MG/DL (ref 74–99)
METER GLUCOSE: 274 MG/DL (ref 74–99)
METER GLUCOSE: 297 MG/DL (ref 74–99)
METER GLUCOSE: 66 MG/DL (ref 74–99)
MONOCYTES ABSOLUTE: 0.31 E9/L (ref 0.1–0.95)
MONOCYTES RELATIVE PERCENT: 6.2 % (ref 2–12)
NEUTROPHILS ABSOLUTE: 1.58 E9/L (ref 1.8–7.3)
NEUTROPHILS RELATIVE PERCENT: 31.6 % (ref 43–80)
PDW BLD-RTO: 12.4 FL (ref 11.5–15)
PHOSPHORUS: 3.5 MG/DL (ref 2.5–4.5)
PLATELET # BLD: 230 E9/L (ref 130–450)
PMV BLD AUTO: 10.6 FL (ref 7–12)
POTASSIUM SERPL-SCNC: 3.8 MMOL/L (ref 3.5–5)
RBC # BLD: 4.37 E12/L (ref 3.5–5.5)
SODIUM BLD-SCNC: 140 MMOL/L (ref 132–146)
TOTAL PROTEIN: 6.8 G/DL (ref 6.4–8.3)
WBC # BLD: 5 E9/L (ref 4.5–11.5)

## 2020-09-04 PROCEDURE — 84100 ASSAY OF PHOSPHORUS: CPT

## 2020-09-04 PROCEDURE — 6370000000 HC RX 637 (ALT 250 FOR IP): Performed by: STUDENT IN AN ORGANIZED HEALTH CARE EDUCATION/TRAINING PROGRAM

## 2020-09-04 PROCEDURE — 74018 RADEX ABDOMEN 1 VIEW: CPT

## 2020-09-04 PROCEDURE — 85025 COMPLETE CBC W/AUTO DIFF WBC: CPT

## 2020-09-04 PROCEDURE — 80048 BASIC METABOLIC PNL TOTAL CA: CPT

## 2020-09-04 PROCEDURE — 80076 HEPATIC FUNCTION PANEL: CPT

## 2020-09-04 PROCEDURE — 78226 HEPATOBILIARY SYSTEM IMAGING: CPT

## 2020-09-04 PROCEDURE — 99232 SBSQ HOSP IP/OBS MODERATE 35: CPT | Performed by: INTERNAL MEDICINE

## 2020-09-04 PROCEDURE — 36415 COLL VENOUS BLD VENIPUNCTURE: CPT

## 2020-09-04 PROCEDURE — 2580000003 HC RX 258: Performed by: INTERNAL MEDICINE

## 2020-09-04 PROCEDURE — 3430000000 HC RX DIAGNOSTIC RADIOPHARMACEUTICAL: Performed by: RADIOLOGY

## 2020-09-04 PROCEDURE — 83735 ASSAY OF MAGNESIUM: CPT

## 2020-09-04 PROCEDURE — 6370000000 HC RX 637 (ALT 250 FOR IP): Performed by: INTERNAL MEDICINE

## 2020-09-04 PROCEDURE — 82962 GLUCOSE BLOOD TEST: CPT

## 2020-09-04 PROCEDURE — 99239 HOSP IP/OBS DSCHRG MGMT >30: CPT | Performed by: STUDENT IN AN ORGANIZED HEALTH CARE EDUCATION/TRAINING PROGRAM

## 2020-09-04 PROCEDURE — 6370000000 HC RX 637 (ALT 250 FOR IP): Performed by: CLINICAL NURSE SPECIALIST

## 2020-09-04 PROCEDURE — A9537 TC99M MEBROFENIN: HCPCS | Performed by: RADIOLOGY

## 2020-09-04 RX ORDER — OXYCODONE HYDROCHLORIDE AND ACETAMINOPHEN 5; 325 MG/1; MG/1
1 TABLET ORAL ONCE
Status: COMPLETED | OUTPATIENT
Start: 2020-09-04 | End: 2020-09-04

## 2020-09-04 RX ORDER — PANTOPRAZOLE SODIUM 40 MG/1
40 TABLET, DELAYED RELEASE ORAL
Qty: 60 TABLET | Refills: 0 | Status: SHIPPED | OUTPATIENT
Start: 2020-09-04 | End: 2020-11-10

## 2020-09-04 RX ORDER — INSULIN GLARGINE 100 [IU]/ML
17 INJECTION, SOLUTION SUBCUTANEOUS NIGHTLY
Status: DISCONTINUED | OUTPATIENT
Start: 2020-09-04 | End: 2020-09-04 | Stop reason: HOSPADM

## 2020-09-04 RX ADMIN — OXYCODONE HYDROCHLORIDE AND ACETAMINOPHEN 1 TABLET: 5; 325 TABLET ORAL at 12:25

## 2020-09-04 RX ADMIN — PANTOPRAZOLE SODIUM 40 MG: 40 TABLET, DELAYED RELEASE ORAL at 06:10

## 2020-09-04 RX ADMIN — Medication 6 MILLICURIE: at 06:59

## 2020-09-04 RX ADMIN — INSULIN LISPRO 5 UNITS: 100 INJECTION, SOLUTION INTRAVENOUS; SUBCUTANEOUS at 16:42

## 2020-09-04 RX ADMIN — MUPIROCIN: 20 OINTMENT TOPICAL at 09:05

## 2020-09-04 RX ADMIN — INSULIN LISPRO 5 UNITS: 100 INJECTION, SOLUTION INTRAVENOUS; SUBCUTANEOUS at 09:04

## 2020-09-04 RX ADMIN — OXYCODONE HYDROCHLORIDE AND ACETAMINOPHEN 1 TABLET: 5; 325 TABLET ORAL at 06:10

## 2020-09-04 RX ADMIN — INSULIN LISPRO 3 UNITS: 100 INJECTION, SOLUTION INTRAVENOUS; SUBCUTANEOUS at 09:05

## 2020-09-04 RX ADMIN — INSULIN LISPRO 3 UNITS: 100 INJECTION, SOLUTION INTRAVENOUS; SUBCUTANEOUS at 16:44

## 2020-09-04 RX ADMIN — Medication 10 ML: at 09:08

## 2020-09-04 ASSESSMENT — PAIN SCALES - GENERAL
PAINLEVEL_OUTOF10: 10
PAINLEVEL_OUTOF10: 6
PAINLEVEL_OUTOF10: 10
PAINLEVEL_OUTOF10: 0

## 2020-09-04 NOTE — PROGRESS NOTES
Comprehensive Nutrition Assessment    Type and Reason for Visit:  Reassess     Nutrition Recommendations/Plan: Continue current diet, Add Ensure HP TID (low-fat ONS)    Nutrition Assessment:  Pt remains nutritionally compromised w/ severe malnutrition 2/2 uncontrolled type I DM w/ h/o gastroparesis. Will add ONS to optimize PO intakes. Provided Carbohydrate Controlled Diet guidelines in discharge instructions & recommend following w/ Diabetes Education for additional guidance.     Malnutrition Assessment:  Malnutrition Status:  Severe malnutrition    Context:  Chronic Illness     Findings of the 6 clinical characteristics of malnutrition:  Energy Intake:  7 - 75% or less estimated energy requirements for 1 month or longer  Weight Loss:  7 - Greater than 5% over 1 month     Body Fat Loss:  7 - Severe body fat loss Triceps   Muscle Mass Loss:  7 - Severe muscle mass loss Clavicles (pectoralis & deltoids)  Fluid Accumulation:  No significant fluid accumulation     Strength:  Not Performed    Estimated Daily Nutrient Needs:  Energy (kcal):  3491-2212; Weight Used for Energy Requirements:  Admission     Protein (g):  55-65; Weight Used for Protein Requirements:  Admission(1.5-1.8)        Fluid (ml/day):  ; Weight Used for Fluid Requirements:  Admission      Nutrition Related Findings:  pt alert, active BS, soft abd, noted constipation x 1 week, no edema, +I/Os      Wounds:  None       Current Nutrition Therapies:    DIET LOW FAT; Carb Control: 4 carbs/meal (approximate 1800 kcals/day)  Dietary Nutrition Supplements: Low Calorie High Protein Supplement    Anthropometric Measures:  · Height: 5' 1\" (154.9 cm)  · Current Body Weight: 80 lb (36.3 kg)   · Admission Body Weight: 90 lb (40.8 kg)(9/4 bed scale - CBW elevated since admit w/ +fluids)    · Usual Body Weight: 91 lb (41.3 kg)(per EMR x 1 mo; 106# in May 2020)     · Ideal Body Weight: 105 lbs; % Ideal Body Weight 76.2 %   · BMI: 15.1  · BMI Categories: Underweight (BMI less than 18.5)       Nutrition Diagnosis:   · Severe malnutrition, In context of chronic illness related to endocrine dysfuntion(uncontrolled DM with complication of gastroparesis and DKA causing inadequate PO) as evidenced by intake 0-25%, poor intake prior to admission, BMI, weight loss greater than or equal to 5% in 1 month, severe muscle loss, severe loss of subcutaneous fat(12% loss in last month (25% loss since May 2020))    Nutrition Interventions:   Food and/or Nutrient Delivery:  Continue Current Diet, Start Oral Nutrition Supplement(Ensure HP TID)  Nutrition Education/Counseling:  (provided Carbohydrate Controlled diet guidelines in discharge instructions, recommend f/up w/ diabetes education for additional guidance)   Coordination of Nutrition Care:  Continued Inpatient Monitoring    Goals:  pt to consume >50% meals/ONS       Nutrition Monitoring and Evaluation:   Food/Nutrient Intake Outcomes:  Food and Nutrient Intake, Supplement Intake  Physical Signs/Symptoms Outcomes:  Biochemical Data, Constipation, GI Status, Fluid Status or Edema, Nutrition Focused Physical Findings, Skin, Weight     Discharge Planning:    Continue Oral Nutrition Supplement, Continue current diet     Electronically signed by Amy Boles MS, RD, LD on 9/4/20 at 12:12 PM EDT    Contact: [phone]

## 2020-09-04 NOTE — DISCHARGE SUMMARY
fat planes. There are normal size and enhancement for the liver, spleen and pancreas. The Gallbladder is partially distended but otherwise appear unremarkable. The biliary tree and pancreatic ductal systems are not dilated. The Adrenals not enlarged. Kidneys have preserved size and cortical thickness and cortical enhancement. There is normal excretion of the contrast by the kidneys. Aorta and IVC appear unremarkable. The Uterus and ovaries have unremarkable appearance. The the cecum is in low position in the right lower pelvic cavity, the appendix is identified and appears intrinsically unremarkable. Lower lung bases demonstrate no significant findings are. The Visualized bone structures are of unremarkable appearance. The     No acute inflammatory changes omental mesenteric fat planes, free intraperitoneal air, ascites or bowel obstruction. No obstructive uropathy. No dilatation of the mesentery of the system. There is unremarkable appearance for the appendix. Xr Chest Portable    Result Date: 2020  Patient MRN:  [id number] : 1995 Age: 27 years Gender: Female Order Date:  2020 2:00 AM TECHNIQUE/NUMBER OF IMAGES/COMPARISON/CLINICAL HISTORY: Chest AP 1 image one view Comparison  Clinical history is cough. FINDINGS: Lungs are clear, no infiltrates, consolidation perfusion seen. Heart has normal size, mediastinum unremarkable. There is no perihilar vascular congestion. There is some air distention of the stomach in a time the present study. No acute cardiopulmonary process. Patient Instructions:      Medication List      START taking these medications    mupirocin 2 % ointment  Commonly known as:  BACTROBAN  Apply topically 3 times daily.   Start taking on:  2020     pantoprazole 40 MG tablet  Commonly known as:  PROTONIX  Take 1 tablet by mouth 2 times daily (before meals)        CONTINUE taking these medications    acetone (urine) test strip  Use daily as directed if bs >250 x2 or illness     Contour Next Test strip  Generic drug:  blood glucose test strips  Neelima Contour test strips.  Checks 4 times/day before meals and at bedtime and as needed for symptoms of irregular blood glucose     doxepin 5 % cream  Commonly known as:  ZONALON  APPLY ONE GRAM (ONE PUMP) EXTERNALLY THREE TIMES A DAY TO PAIN AREA TO LOWER BACK     Insulin Pump - Insulin regular     NovoLOG 100 UNIT/ML injection vial  Generic drug:  insulin aspart     VersaPro Gel  APPLY ONE GRAM (ONE PUMP) EXTERNALLY FOUR TIMES A DAY TO LOWER BACK           Where to Get Your Medications      These medications were sent to [address] [place] [address] [phone]  [address] [place] [address]    Phone:  [phone]   · mupirocin 2 % ointment  · pantoprazole 40 MG tablet           Note that more than 30 minutes was spent in preparing discharge papers, discussing discharge with patient, medication review, etc.    Signed:  Electronically signed by Vijay Serrano MD on 9/4/2020 at 11:44 AM

## 2020-09-04 NOTE — PROGRESS NOTES
CLINICAL PHARMACY NOTE: MEDS TO [address] [facility] Patient?: No  Total # of Prescriptions Filled: 2   The following medications were delivered to the patient:  · Mupirocin 2  · Pantoprazole 40  Total # of Interventions Completed: 6  Time Spent (min): 45    Additional Documentation:

## 2020-09-04 NOTE — CARE COORDINATION
For HIDA scan today. [facility]following- will need order on discharge. Pt to follow up w/ Endocrine on 9/23/20 @ 8am on discharge. Pt has glucometer , syringes, and all necessary diabetic testing supplies at home. Insulin pump off and initiation will be followed by Endocrine on discharge.  Benito Murders

## 2020-09-04 NOTE — PROGRESS NOTES
ENDOCRINOLOGY PROGRESS NOTE      Date of Admission: 8/30/2020  Date of service: 9/4/2020  Admitting Physician: Yuli Schwartz MD   Primary Care Physician: Sommer Baldwin DO  Consultant physician: Kasandra Hinojosa MD     Reason for the consultation:  Poorly controlled DM type 1 admitted with DKA     History of Present Illness: The history is provided by the patient. Accuracy of the patient data is excellent    Eliot Aranda is a very pleasant 27 y.o. old female with PMH of poorly controlled type 1 DM admitted to [facility] on 8/30/2020 with with vomiting and found to be in DKA, endocrine team was consulted for diabetes management.     Subjective:  Seen and examined this morning, BS was above goal this morning     Inpatient diet:   Carb Restricted diet     Point of care glucose monitoring:   Independently reviewed   Recent Labs     09/02/20  1120 09/02/20  1619 09/02/20 2021 09/03/20  0627 09/03/20  1129 09/03/20  1747 09/03/20 2005 09/04/20  0609   GLUMET 88 90 210* 223* 113* 418* 288* 274*         Allergies and Drug reactions  No Known Allergies    Scheduled Meds:   insulin lispro  5 Units Subcutaneous TID WC    insulin glargine  17 Units Subcutaneous Nightly    docusate sodium  100 mg Oral BID    mineral oil  1 enema Rectal Once    mupirocin   Nasal Daily    pantoprazole  40 mg Oral BID AC    sodium chloride flush  10 mL Intravenous 2 times per day    enoxaparin  40 mg Subcutaneous Daily    insulin lispro  0-6 Units Subcutaneous TID WC    insulin lispro  0-3 Units Subcutaneous Nightly     PRN Meds:   oxyCODONE-acetaminophen, 1 tablet, Q6H PRN  glucose, 15 g, PRN  glucagon (rDNA), 1 mg, PRN  dextrose, 100 mL/hr, PRN  sodium chloride flush, 10 mL, PRN  acetaminophen, 650 mg, Q6H PRN    Or  acetaminophen, 650 mg, Q6H PRN  magnesium hydroxide, 30 mL, Daily PRN  promethazine, 12.5 mg, Q6H PRN    Or  ondansetron, 4 mg, Q6H PRN  dextrose, 12.5 g, PRN  ketorolac, 15 mg, Q6H PRN      Continuous Infusions:   dextrose Review of Systems  Constitutional: tired   HEENT: No blurred vision, No sore throat, no ear pain, no hair loss  Neck: denied any neck swelling, difficulty swallowing,   Cardio-pulmonary: No CP, SOB or palpitation, No orthopnea or PND. No cough or wheezing. GI: No N/V/D, no constipation, mild epigastric pain   : Denied any dysuria, hematuria, flank pain, discharge, or incontinence. Skin: denied any rash, ulcer, or hyperpigmentation. MSK: denied any joint deformity, joint pain/swelling, muscle pain, or back pain. Neuro: no numbness, no tingling, no weakness, _    OBJECTIVE    /88   Pulse 99   Temp 97.9 °F (36.6 °C) (Oral)   Resp 18   Ht 5' 1\" (1.549 m)   Wt 90 lb 8 oz (41.1 kg)   SpO2 99%   BMI 17.10 kg/m²   No intake or output data in the 24 hours ending 09/04/20 1027    Physical examination:  General: awake alert, oriented x3, no abnormal position or movements. HEENT: normocephalic non-traumatic, no exophthalmos   Neck: supple, no LN enlargement, no thyromegaly, no thyroid tenderness, no JVD. Pulm: Clear equal air entry no added sounds, no wheezing or rhonchi    CVS: S1 + S2, no murmur, no heave. Dorsalis pedis pulse palpable   Abd: soft lax, + epigastric tenderness, no organomegaly, audible bowel sounds. Skin: warm, no lesions, no rash.  No callus, no Ulcers, No acanthosis nigricans   Neuro: CN intact,  muscle power normal  Psych: normal mood, and affect    Review of Laboratory Data:  I personally reviewed the following labs:   Recent Labs     09/02/20  1305 09/03/20  1203   WBC 6.0 4.6   RBC 4.28 4.99   HGB 12.0 13.7   HCT 34.9 41.3   MCV 81.5 82.8   MCH 28.0 27.5   MCHC 34.4 33.2   RDW 12.9 12.7    275   MPV 11.2 10.7     Recent Labs     09/03/20  1203   PROT 8.0   LABALBU 4.3   BILITOT 0.5   ALKPHOS 69   AST 23   ALT 13     Beta-Hydroxybutyrate   Date Value Ref Range Status   08/31/2020 >4.50 (H) 0.02 - 0.27 mmol/L Final   07/12/2020 >4.50 (H) 0.02 - 0.27 mmol/L Final 06/29/2020 >4.50 (H) 0.02 - 0.27 mmol/L Final     Lab Results   Component Value Date    LABA1C 13.0 05/30/2020    LABA1C 15.9 03/09/2020    LABA1C 14.2 08/06/2019     No results found for: TSH, T4FREE, L0OCSNO, FT3, J9TMHFX, TSI, TPOABS, THGAB  Lab Results   Component Value Date    LABA1C 13.0 05/30/2020    GLUCOSE 198 09/01/2020    MALBCR 204.6 01/23/2018    LABMICR 102.3 01/23/2018    LABCREA 50 01/23/2018     Lab Results   Component Value Date    CHOL 175 01/24/2018    TRIG 106 01/24/2018    HDL 40 01/24/2018       Blood culture   Lab Results   Component Value Date    BC 24 Hours no growth 08/31/2020    BC 5 Days no growth 07/13/2020       Radiology:  MRI ABDOMEN W WO CONTRAST   Final Result   Fatty change of liver with normal appearance of the gallbladder and   hepatobiliary tree, and no evidence of focal visceral abnormality. Motion artifact and limited sequences are noted. NM GASTRIC EMPTYING   Final Result   Normal and uniform gastric emptying study with a T1 half of 60   minutes. CT THORACIC SPINE W CONTRAST   Final Result   Addendum 1 of 1   The study was performed with IV contrast.      Final      CT LUMBAR SPINE W CONTRAST   Final Result      NO ACUTE FRACTURE OR SIGNIFICANT SUBLUXATION IS IDENTIFIED. XR CHEST PORTABLE   Final Result   No acute cardiopulmonary process. CT ABDOMEN PELVIS W IV CONTRAST Additional Contrast? None   Final Result   No acute inflammatory changes omental mesenteric fat planes, free   intraperitoneal air, ascites or bowel obstruction. No obstructive uropathy. No dilatation of the mesentery of the system. There is unremarkable appearance for the appendix.       NM HEPATOBILIARY    (Results Pending)   XR ABDOMEN (KUB) (SINGLE AP VIEW)    (Results Pending)       Medical Records/Labs/Images review:   I personally reviewed and summarized previous records   All labs and imaging were reviewed independently     ASSESSMENT & PLAN Montana Paris, a 27 y.o.-old female seen in the hospital today for diabetes management     DM1 admitted with DKA   · Patient's DM is poorly controlled  due to poor compliance with insulin therapy, BS checking and follow up visits   · She recently completed insulin pump training and to start pump very soon   · Change current DM regimen   · Lantus 17 units nightly   · Humalog 5 units with meals   · Low dose sliding scale   · Glucose check before meals and at bed time   · Will titrate insulin dose based on blood glucose trend & insulin requirement  · On discharge, we recommend discharging pt on Lantus 17 units daily at bedtime, Humalog 1u:10g of Carbs + low dose sliding scale   · Pt will follow with us shortly after discharge. Endocrine follow up appointment Wednesday 9/23 at 8am     Noncompliance   I have discussed with her the great importance of following the treatment plan exactly as directed in order to achieve a good medical outcome.  Discussed with patient the importance of eating consistent carbohydrate meals, avoiding high glycemic index food. Also, discussed with patient the risk and negative consequences of dietary noncompliance on blood glucose control, blood pressure and weight    Abdominal pain   · Managed by primary and GI   · Underwent NM hepatobiliary scan this morning       The above issues were reviewed with the patient who understood and agreed with the plan. Thank you for allowing us to participate in the care of this patient. Please do not hesitate to contact us with any additional questions. Derek Orozco MD  Endocrinologist, [place] [address] [facility]   [address] [place] [address]   Phone: [phone]  Fax: [phone]  --------------------------------------------  An electronic signature was used to authenticate this note.  Sebastián Cartagena MD on 9/4/2020 at 10:27 AM

## 2020-09-04 NOTE — PROGRESS NOTES
PROGRESS NOTE    Patient Presents with/Seen in Consultation For      *gastroparesis   CHIEF COMPLAINT:  abd pain, back pain, n/v, and inability to eat/drink    Subjective:     Patient seen Mancel Mater in bed, with diffuse abdominal pain. Denies any N/V. Ate 100% of breakfast. Reports no BM for \"weeks\". BS hypo. States she took everything ordered yesterday except for the enema. Patient reeducated regarding the need for the enema. States she will take it today, all questions answered. Review of Systems  Aside from what was mentioned in the PMH and HPI, essentially unremarkable, all others negative. Objective:     /88   Pulse 99   Temp 97.9 °F (36.6 °C) (Oral)   Resp 18   Ht 5' 1\" (1.549 m)   Wt 90 lb 8 oz (41.1 kg)   SpO2 99%   BMI 17.10 kg/m²     General appearance: alert, awake, extremely thin, and cooperative  Eyes: conjunctiva pale, sclera anicteric. PERRL.   Lungs: clear to auscultation bilaterally  Heart: regular rate and rhythm, no murmur, 2+ pulses; no edema  Abdomen: soft, diffuse tenderness to palpitation with guarding, JEOVANY rebound; bowel sounds hypo; no masses,  no organomegaly  Extremities: extremities without edema  Pulses: 2+ and symmetric  Skin: Skin color, texture, turgor normal.   Neurologic: Grossly normal    insulin lispro (HUMALOG) injection vial 5 Units, TID WC  insulin glargine (LANTUS) injection vial 17 Units, Nightly  oxyCODONE-acetaminophen (PERCOCET) 5-325 MG per tablet 1 tablet, Q6H PRN  docusate sodium (COLACE) capsule 100 mg, BID  mineral oil enema 1 enema, Once  mupirocin (BACTROBAN) 2 % ointment, Daily  pantoprazole (PROTONIX) tablet 40 mg, BID AC  glucose (GLUTOSE) 40 % oral gel 15 g, PRN  glucagon (rDNA) injection 1 mg, PRN  dextrose 5 % solution, PRN  sodium chloride flush 0.9 % injection 10 mL, 2 times per day  sodium chloride flush 0.9 % injection 10 mL, PRN  acetaminophen (TYLENOL) tablet 650 mg, Q6H PRN    Or  acetaminophen (TYLENOL) suppository 650 mg, Q6H PRN  magnesium hydroxide (MILK OF MAGNESIA) 400 MG/5ML suspension 30 mL, Daily PRN  promethazine (PHENERGAN) tablet 12.5 mg, Q6H PRN    Or  ondansetron (ZOFRAN) injection 4 mg, Q6H PRN  enoxaparin (LOVENOX) injection 40 mg, Daily  dextrose 50 % IV solution, PRN  ketorolac (TORADOL) injection 15 mg, Q6H PRN  insulin lispro (HUMALOG) injection vial 0-6 Units, TID WC  insulin lispro (HUMALOG) injection vial 0-3 Units, Nightly         Data Review  CBC:   Lab Results   Component Value Date    WBC 4.6 2020    RBC 4.99 2020    HGB 13.7 2020    HCT 41.3 2020    MCV 82.8 2020    MCH 27.5 2020    MCHC 33.2 2020    RDW 12.7 2020     2020    MPV 10.7 2020     CMP:    Lab Results   Component Value Date     2020    K 4.0 2020    K 4.7 2020     2020    CO2 19 2020    BUN 8 2020    CREATININE 0.6 2020    GFRAA >60 2020    LABGLOM >60 2020    GLUCOSE 198 2020    PROT 8.0 2020    LABALBU 4.3 2020    CALCIUM 9.0 2020    BILITOT 0.5 2020    ALKPHOS 69 2020    AST 23 2020    ALT 13 2020     Hepatic Function Panel:    Lab Results   Component Value Date    ALKPHOS 69 2020    ALT 13 2020    AST 23 2020    PROT 8.0 2020    BILITOT 0.5 2020    BILIDIR <0.2 2020    IBILI see below 2020    LABALBU 4.3 2020     No components found for: CHLPLat  Lab Results   Component Value Date    TRIG 106 2018   e    LDLCALC 114 (H) 2018             Date    PROTIME 11.6 02/10/2019    INR 1.0 02/10/2019     IRON:    Lab Results   Component Value Date    IRON 37 2018     Iron Saturation:  No components found for: PERCENTFE  FERRITIN:    Lab Results   Component Value Date    FERRITIN 32 2018     Mri Abdomen W Wo Contrast    Result Date: 9/3/2020  Patient MRN:  [id number] : 1995 Age: 27 years Gender: Female Order Date:  9/3/2020 10:45 AM EXAM: MRI ABDOMEN W WO CONTRAST NUMBER OF IMAGES \ views:  174 INDICATION: Abdominal pain, nausea, emesis, and weight loss, diabetic patient COMPARISON: Nuclear medicine gastric emptying study yesterday 0733 hours, CT abdomen and pelvis August 31, 2020 reporting normal anatomy, and CT abdomen pelvis with contrast May 31, 2020 reporting possible constipation TECHNIQUE: Multiple sequences of the abdomen with sagittal and coronal MPR reconstructions were obtained from the top of the diaphragm to the lower abdomen and 1.5 Raysa GE magnet, with postcontrast images obtained in conjunction with the administration of 80 mL of ProHance gadolinium contrast.. There are limited number of sequences, and respiratory motion does degrade detail somewhat. FINDINGS: Hepatic parenchyma shows diffuse fatty change without focal findings. The portal venous and hepatic venous parenchymal appearance are normal. The gallbladder is normal in appearance. There is no ectasia of the biliary tree. Spleen is normal in appearance. There is no pancreatic enlargement or inflammation. The adrenals are normal in appearance. The kidneys show uniform cortical signal and uniform enhancement with no evidence of obstructive dilation or perinephric inflammatory change. There is limited visualization of bowel structures, but no evidence of obstructive dilation or intra-abdominal or retroperitoneal free fluid. Included spinal anatomy shows no acute pathology or compromise of the central spinal canal. There is no retroperitoneal adenopathy. Prespinal retroperitoneal great vessels are incompletely characterized, but appear normal Lower thoracic images show no evidence of dependent cardiopulmonary pathology or pleural effusion. Fatty change of liver with normal appearance of the gallbladder and hepatobiliary tree, and no evidence of focal visceral abnormality. Motion artifact and limited sequences are noted.        Assessment: Principal Problem:    DKA, type 1 ([facility] [address]  Active Problems:  ? Abdominal pain, diffuse radiating to back  ? Nausea and vomiting -improved  ? DKA, resolved-defer to endocrinology  ? Anemia, normocytic, normochromic  ? Back pain-defer to PCP  ? Leukocytosis, resolved  ? Constipation, no BM x >1 week   ? Recent marijuana use    Plan:     ? KUB STAT  ? HIDA- pending   ? Patient refused enema yesterday. ? Porphyria panel- pending  ? Continue Colace 200 mg at at bedtime as ordered  ? Continue Pantoprazole as ordered  ? Medicate for nausea as ordered per PCP  ? Medical management per Primary Care  ? Marijuana cessation - pt agrees  ? OP colon too follow; office to arrange   ?  Continue to monitor    Discussed with Dr. Evelyn Hernandez per Dr. Alberto Siddiqui NP-C 9/4/2020 10:00 AM For Dr. Nancy Delarosa

## 2020-09-04 NOTE — DISCHARGE INSTR - DIET
 Good nutrition is important when healing from an illness, injury, or surgery. Follow any nutrition recommendations given to you during your hospital stay.  If you were given an oral nutrition supplement while in the hospital, continue to take this supplement at home. You can take it with meals, in-between meals, and/or before bedtime. These supplements can be purchased at most local grocery stores, pharmacies, and chain super-stores.  If you have any questions about your diet or nutrition, call the hospital and ask for the dietitian. Type I Diabetes Nutrition Therapy    Why Is Carbohydrate Counting Important? Counting carbohydrate servings may help you control your blood glucose level so you feel better. The balance between the carbohydrates you eat and insulin determines what your blood glucose level will be after eating. Carbohydrate counting can also help you plan your meals. Which Foods Have Carbohydrates? Foods with carbohydrates include:  Breads, crackers, and cereals   Pasta, rice, and grains   Starchy vegetables, such as potatoes, corn, and peas   Beans and legumes   Milk, soy milk, and yogurt   Fruits and fruit juices   Sweets, such as cakes, cookies, ice cream, jam, and jelly  Carbohydrate Servings  In diabetes meal planning, 1 serving of a food with carbohydrate has about 15 grams of carbohydrate:  Check serving sizes with measuring cups and spoons or a food scale. Read the Nutrition Facts on food labels to find out how many grams of carbohydrate are in foods you eat. The food lists in this handout show portions that have about 15 grams of carbohydrate. Tips  Meal Planning Tips  An Eating Plan tells you how many carbohydrate servings to eat at your meals and snacks. For many adults, eating 3 to 5 servings of carbohydrate foods at each meal and 1 or 2 carbohydrate servings for each snack works well. In a healthy daily Eating Plan, most carbohydrates come from:    At least 6 servings of fruits and nonstarchy vegetables   At least 6 servings of grains, beans, and starchy vegetables, with at least 3 servings from whole grains   At least 2 servings of milk or milk products  Check your blood glucose level regularly. It can tell you if you need to adjust when you eat carbohydrates. Eating foods that have fiber, such as whole grains, and having very few salty foods is good for your health. Eat 4 to 6 ounces of meat or other protein foods (such as soybean burgers) each day. Choose low-fat sources of protein, such as lean beef, lean pork, chicken, fish, low-fat cheese, or vegetarian foods such as soy. Eat some healthy fats, such as olive oil, canola oil, and nuts. Eat very little saturated fats. These unhealthy fats are found in butter, cream, and high-fat meats, such as bradley and sausage. Eat very little or no trans fats. These unhealthy fats are found in all foods that list partially hydrogenated oil as an ingredient. Label Reading Tips  The Nutrition Facts panel on a label lists the grams of total carbohydrate in 1 standard serving. The labels standard serving may be larger or smaller than 1 carbohydrate serving. To figure out how many carbohydrate servings are in the food:   First, look at the labels standard serving size. Check the grams of total carbohydrate. This is the amount of carbohydrate in 1 standard serving.;   Divide the grams of total carbohydrate by 15. This number equals the number of carbohydrate servings in 1 standard serving. Remember: 1 carbohydrate serving is 15 grams of carbohydrate. Note: You may ignore the grams of sugars on the Nutrition Facts panel because they are included in the grams of total carbohydrate.     Foods Recommended  Food Lists for Carbohydrate Counting  1 serving = about 15 grams of carbohydrate  Starches  1 slice bread (1 ounce)   1 tortilla (6-inch size)   ¼ large bagel (1 ounce)   2 taco shells (5-inch size)   ½ hamburger or hot dog bun (¾ ounce)   ¾ cup ready-to-eat nonsweetened cereal   ½ cup cooked cereal   1 cup broth-based soup   4 to 6 small crackers   1/3 cup pasta or rice (cooked)   ½ cup beans, peas, corn, sweet potatoes, winter squash, or mashed or boiled potatoes (cooked)   ¼ large baked potato (3 ounces)   ¾ ounce pretzels, potato chips, or tortilla chips   3 cups popcorn (popped)  Fruit  1 small fresh fruit (¾ to 1 cup)   ½ cup canned or frozen fruit   2 tablespoons dried fruit (blueberries, cherries, cranberries, mixed fruit, raisins)   17 small grapes (3 ounces)   1 cup melon, berries   ½ cup unsweetened fruit juice  Milk  1 cup fat-free or reduced-fat milk   1 cup soy milk   2/3 cup (6 ounces) nonfat yogurt sweetened with sugar-free sweetener  Sweets and Desserts  2-inch square cake (unfrosted)   2 small cookies (2/3 ounce)   ½ cup ice cream or frozen yogurt   ¼ cup sherbet or sorbet   1 tablespoon syrup, jam, jelly, table sugar, or honey   2 tablespoons light syrup  Other Foods  Count 1 cup raw vegetables or ½ cup cooked nonstarchy vegetables as zero (0) carbohydrate servings or free foods. If you eat 3 or more servings at one meal, count them as 1 carbohydrate serving. Foods that have less than 20 calories in each serving also may be counted as zero carbohydrate servings or free foods. Count 1 cup of casserole or other mixed foods as 2 carbohydrate servings.     Type 1 Diabetes Sample 1-Day Menu View Nutrient Info   Breakfast 1 slice whole grain toast (1 carbohydrate serving)   1 teaspoon trans-fat free margarine   1 egg omelet with spinach and mushrooms   1 orange (1 carbohydrate serving)   6 oz Yogurt, Greek, plain, lowfat (1 carbohydrate serving)   Morning Snack 3 miranda cracker squares (1 carbohydrate serving)   1 tablespoon peanut butter   Lunch 2 pieces whole grain bread (2 carbohydrate servings)   2 ounces turkey breast   Lettuce and tomato salad   1 small apple (1 carbohydrate serving)   1 cup cucumber slices (0.5 carbohydrate serving)   Afternoon Snack 5 whole wheat crackers (1 carbohydrate serving)   1 small apple (1 carbohydrate serving)   Evening Meal 3 ounces baked chicken   1 cup baked, mashed sweet potato (2 carbohydrate serving)   1/2 cup cooked broccoli   1 large green salad   1 cup fat-free milk (1 carbohydrate serving)   1 cup fresh raspberries (1 carbohydrate serving)   Evening Snack 1 tablespoon nuts   1/2 cup ice cream (1 carbohydrate serving)   1-1/4 cups strawberries (1 carbohydrate serving)     Recommend following up with Diabetes Education for additional guidance.

## 2020-09-05 LAB
BLOOD CULTURE, ROUTINE: NORMAL
CULTURE, BLOOD 2: NORMAL

## 2020-09-07 LAB
PORPHYRIN INTERPRETATION: ABNORMAL
PORPHYRIN TOTAL: <10 NMOL/L (ref 0–15)

## 2020-09-08 LAB
COPROPORPHYRIN I URINE (/MOL CRT): 2 UMOL/MOL CRT (ref 0–6)
COPROPORPHYRIN III URINE (/MOL CRT): 10 UMOL/MOL CRT (ref 0–14)
HEPTACARBOXYLATE PORPHYRIN 24 HOUR URINE: 0 UMOL/MOL CRT (ref 0–2)
PGB/24HR: NORMAL UMOL/D (ref 0–11)
PGB/L: 4.2 UMOL/L (ref 0–8.8)
PORPHYRIN URINE INTERPRETATION: NEGATIVE
UROPORPHYRIN 24 HOUR URINE: 1 UMOL/MOL CRT (ref 0–4)

## 2020-09-09 LAB
AMINOLEVULINIC ACID 24 HOUR UR: ABNORMAL UMOL/D (ref 0–60)
AMINOLEVULINIC ACID URINE: 39 UMOL/L (ref 0–35)
CREATININE 24 HOUR URINE: ABNORMAL MG/D (ref 700–1600)
CREATININE URINE: 207 MG/DL
HOURS COLLECTED: ABNORMAL
URINE TOTAL VOLUME: ABNORMAL

## 2020-09-10 LAB
Lab: NORMAL
REPORT: NORMAL
THIS TEST SENT TO: NORMAL

## 2020-09-23 ENCOUNTER — VIRTUAL VISIT (OUTPATIENT)
Dept: ENDOCRINOLOGY | Age: 25
End: 2020-09-23
Payer: COMMERCIAL

## 2020-09-23 PROCEDURE — 3046F HEMOGLOBIN A1C LEVEL >9.0%: CPT | Performed by: INTERNAL MEDICINE

## 2020-09-23 PROCEDURE — G8427 DOCREV CUR MEDS BY ELIG CLIN: HCPCS | Performed by: INTERNAL MEDICINE

## 2020-09-23 PROCEDURE — 1036F TOBACCO NON-USER: CPT | Performed by: INTERNAL MEDICINE

## 2020-09-23 PROCEDURE — G8419 CALC BMI OUT NRM PARAM NOF/U: HCPCS | Performed by: INTERNAL MEDICINE

## 2020-09-23 PROCEDURE — 99214 OFFICE O/P EST MOD 30 MIN: CPT | Performed by: INTERNAL MEDICINE

## 2020-09-23 PROCEDURE — 2022F DILAT RTA XM EVC RTNOPTHY: CPT | Performed by: INTERNAL MEDICINE

## 2020-09-23 PROCEDURE — 1111F DSCHRG MED/CURRENT MED MERGE: CPT | Performed by: INTERNAL MEDICINE

## 2020-09-23 RX ORDER — PERPHENAZINE 16 MG/1
TABLET, FILM COATED ORAL
Qty: 250 STRIP | Refills: 5 | Status: SHIPPED
Start: 2020-09-23 | End: 2020-11-10

## 2020-09-23 NOTE — PROGRESS NOTES
[address] [place] [address] Department of Endocrinology Diabetes and Metabolism   [address] [facility] [address]   Phone: [phone]  Fax: [phone]    Date of Service: 9/23/2020    Primary Care Physician: Mel Hodgkins, DO. Provider: Keaton Modi MD    Reason for the visit:  Type 1 DM, vitD deficiency      History of Present Illness: The history is provided by the patient. No  was used. Accuracy of the patient data is excellent. Nory Castro is a very pleasant 27 y.o. female seen today for follow up visit     Nory Castro was diagnosed with diabetes at age 8 and currently on insulin pump (started 8/2020)   Pump settings: Basal rate 12a 0.8, CR 14, ISF 53, goal 12a 110-130, 7a 100-120, 9p 110-130, active 3 hrs    Feels much better on insulin pump   The patient has been checking BS 4 times a day with meals and at bedtime, readings highly variable   Lab Results   Component Value Date    LABA1C 13.0 05/30/2020    LABA1C 15.9 03/09/2020    LABA1C 14.2 08/06/2019    LABA1C 14.0 04/11/2019     No recent hypoglycemia   Her diabetes complicated with gastroparesis   I reviewed current medications and the patient has no issues with diabetes medications  Nory Castro is due for eye exam   The patient performs her own feet care and doesn't see podiatry service   Microvascular complications: + Neuropathy. No Retinopathy, No Nephropathy   Macrovascular complications: no CAD, PVD, or Stroke  The patient receives Flushot every year. She has not received the pneumonia vaccine.     PAST MEDICAL HISTORY   Past Medical History:   Diagnosis Date    Bleeding at insertion site 1/5/2018    Common femoral artery injury, right, initial encounter 1/5/2018    DM type 1 (diabetes mellitus, type 1) ([facility] [address]      PAST SURGICAL HISTORY   Past Surgical History:   Procedure Laterality Date    ABDOMEN SURGERY N/A 5/9/2019    INCISION AND DRAINAGE OF SUPRAPUBIC ABSESS performed by Hilaria Petersen diaphoresis, no generalized weakness. HEENT: No blurred vision, No sore throat, no ear pain, no hair loss  Neck: denied any neck swelling, difficulty swallowing,   Cardio-pulmonary: No CP, SOB or palpitation, No orthopnea or PND. No cough or wheezing. GI: No N/V/D, no constipation, No abdominal pain, no melena or hematochezia   : Denied any dysuria, hematuria, flank pain, discharge, or incontinence. Skin: denied any rash, ulcer, Hirsute, or hyperpigmentation. MSK: denied any joint deformity, joint pain/swelling, muscle pain, or back pain. Neuro: + numbness and tingling in lower extremities. OBJECTIVE    There were no vitals taken for this visit. BP Readings from Last 4 Encounters:   09/04/20 122/88   08/17/20 130/85   08/07/20 110/68   08/03/20 127/89     Wt Readings from Last 6 Encounters:   09/04/20 90 lb 8 oz (41.1 kg)   08/17/20 95 lb (43.1 kg)   08/07/20 95 lb (43.1 kg)   08/03/20 95 lb (43.1 kg)   07/27/20 104 lb (47.2 kg)   07/15/20 102 lb (46.3 kg)     Physical examination:  Due to this being a TeleHealth encounter, evaluation of the following organ systems is limited: EENT/Resp/CV/GI//MS/Neuro/Skin/Heme-Lymph-Imm. General: awake alert, oriented x3, no abnormal position or movements.   Pulm: move with respiration   Skin: no rash  Psych: normal mood, and affect    Review of Laboratory Data:  I have reviewed the following:  Lab Results   Component Value Date/Time    WBC 5.0 09/04/2020 12:14 PM    RBC 4.37 09/04/2020 12:14 PM    HGB 12.3 09/04/2020 12:14 PM    HCT 36.2 09/04/2020 12:14 PM    MCV 82.8 09/04/2020 12:14 PM    MCH 28.1 09/04/2020 12:14 PM    MCHC 34.0 09/04/2020 12:14 PM    RDW 12.4 09/04/2020 12:14 PM     09/04/2020 12:14 PM    MPV 10.6 09/04/2020 12:14 PM      Lab Results   Component Value Date/Time     09/04/2020 12:13 PM    K 3.8 09/04/2020 12:13 PM    K 4.7 07/12/2020 04:26 PM    CO2 30 (H) 09/04/2020 12:13 PM    BUN 13 09/04/2020 12:13 PM    CREATININE 0.5 09/04/2020 12:13 PM    CALCIUM 9.6 09/04/2020 12:13 PM    LABGLOM >60 09/04/2020 12:13 PM    GFRAA >60 09/04/2020 12:13 PM      No results found for: TSH, T4FREE, T0TZJXV, FT3, C9GSEHR, TSI, TPOABS, THGAB  Lab Results   Component Value Date    LABA1C 13.0 05/30/2020    GLUCOSE 50 09/04/2020    MALBCR 204.6 01/23/2018    LABMICR 102.3 01/23/2018    LABCREA 207 09/02/2020     Lab Results   Component Value Date    CHOL 175 01/24/2018    TRIG 106 01/24/2018    HDL 40 01/24/2018     No results found for: [address] [facility] [address] Records/Labs/Images review:   I personally reviewed and summarized previous records   All labs were reviewed independently     [address] 60, a 27 y.o.-old female seen in for the following issues     Diabetes Mellitus Type 1    · BS doing live since started insulin pump   · Continue current pump settings: Basal rate 12a 0.8, CR 14, ISF 53, Target 12a 110-130, 7a 100-120, 9p 110-130, active 3 hrs    · The patient was advised to continue checking blood sugars 4 times a day before meals and at bedtime  · Pt will bring pump for download in a wk   · Discussed with patient A1c and blood sugar goals   · Patient will need routine diabetes maintenance and prevention    Hypoglycemia   · Improved since started insulin pump   · Continue using CGM     vitD deficiency   · Continue  vitD supplement     Return in about 6 weeks (around 11/4/2020) for DM type 1 . The above issues were reviewed with the patient who understood and agreed with the plan. Thank you for allowing us to participate in the care of this patient. Please do not hesitate to contact us with any additional questions. Diagnosis Orders   1. Insulin pump in place     2. Type 1 diabetes mellitus with other specified complication (HCC)  blood glucose test strips (CONTOUR NEXT TEST) strip   3. Vitamin D deficiency     4.  Hypoglycemia         Melina Mclaughlin MD  Endocrinologist, [facility])   [address] [facility], [address] [facility],[address] [address]   Phone: [phone]  Fax: [phone]  -------------------------   An electronic signature was used to authenticate this note. Rachele Sandifer, MD on 9/23/2020 at 8:19 AM  Services were provided through a video synchronous discussion virtually to substitute for in-person clinic visit.

## 2020-09-23 NOTE — PROGRESS NOTES
Pankaj Billy was read the following message We want to confirm that, for purposes of billing, this is a virtual visit with your provider for which we will submit a claim for reimbursement with your insurance company. You will be responsible for any copays, coinsurance amounts or other amounts not covered by your insurance company. If you do not accept this, unfortunately we will not be able to schedule a virtual visit with the provider. Do you accept?  Jared Cox responded YES

## 2020-10-07 ENCOUNTER — HOSPITAL ENCOUNTER (EMERGENCY)
Age: 25
Discharge: HOME | DRG: 137 | End: 2020-10-07
Attending: EMERGENCY MEDICINE
Payer: COMMERCIAL

## 2020-10-07 VITALS
TEMPERATURE: 98.2 F | DIASTOLIC BLOOD PRESSURE: 68 MMHG | OXYGEN SATURATION: 99 % | HEART RATE: 86 BPM | RESPIRATION RATE: 14 BRPM | WEIGHT: 87 LBS | SYSTOLIC BLOOD PRESSURE: 102 MMHG | BODY MASS INDEX: 16.44 KG/M2

## 2020-10-07 LAB
ALBUMIN SERPL-MCNC: 4.1 G/DL (ref 3.5–5.2)
ALP BLD-CCNC: 83 U/L (ref 35–104)
ALT SERPL-CCNC: 23 U/L (ref 0–32)
ANION GAP SERPL CALCULATED.3IONS-SCNC: 15 MMOL/L (ref 7–16)
AST SERPL-CCNC: 28 U/L (ref 0–31)
BACTERIA: ABNORMAL /HPF
BASOPHILS ABSOLUTE: 0.04 E9/L (ref 0–0.2)
BASOPHILS RELATIVE PERCENT: 0.6 % (ref 0–2)
BETA-HYDROXYBUTYRATE: 2.95 MMOL/L (ref 0.02–0.27)
BILIRUB SERPL-MCNC: 0.6 MG/DL (ref 0–1.2)
BILIRUBIN URINE: ABNORMAL
BLOOD, URINE: NEGATIVE
BUN BLDV-MCNC: 25 MG/DL (ref 6–20)
CALCIUM SERPL-MCNC: 10.2 MG/DL (ref 8.6–10.2)
CHLORIDE BLD-SCNC: 94 MMOL/L (ref 98–107)
CHP ED QC CHECK: NORMAL
CHP ED QC CHECK: NORMAL
CLARITY: CLEAR
CO2: 22 MMOL/L (ref 22–29)
COLOR: YELLOW
CREAT SERPL-MCNC: 0.6 MG/DL (ref 0.5–1)
EKG ATRIAL RATE: 118 BPM
EKG P AXIS: 86 DEGREES
EKG P-R INTERVAL: 134 MS
EKG Q-T INTERVAL: 248 MS
EKG QRS DURATION: 66 MS
EKG QTC CALCULATION (BAZETT): 347 MS
EKG R AXIS: 68 DEGREES
EKG T AXIS: 71 DEGREES
EKG VENTRICULAR RATE: 118 BPM
EOSINOPHILS ABSOLUTE: 0.01 E9/L (ref 0.05–0.5)
EOSINOPHILS RELATIVE PERCENT: 0.1 % (ref 0–6)
EPITHELIAL CELLS, UA: ABNORMAL /HPF
GFR AFRICAN AMERICAN: >60
GFR NON-AFRICAN AMERICAN: >60 ML/MIN/1.73
GLUCOSE BLD-MCNC: 235 MG/DL
GLUCOSE BLD-MCNC: 314 MG/DL
GLUCOSE BLD-MCNC: 320 MG/DL (ref 74–99)
GLUCOSE URINE: >=1000 MG/DL
HCG QUALITATIVE: NEGATIVE
HCT VFR BLD CALC: 37.7 % (ref 34–48)
HEMOGLOBIN: 12.5 G/DL (ref 11.5–15.5)
HYALINE CASTS: ABNORMAL /LPF (ref 0–2)
IMMATURE GRANULOCYTES #: 0.02 E9/L
IMMATURE GRANULOCYTES %: 0.3 % (ref 0–5)
KETONES, URINE: >=80 MG/DL
LACTIC ACID: 1.7 MMOL/L (ref 0.5–2.2)
LEUKOCYTE ESTERASE, URINE: NEGATIVE
LIPASE: 8 U/L (ref 13–60)
LYMPHOCYTES ABSOLUTE: 2.93 E9/L (ref 1.5–4)
LYMPHOCYTES RELATIVE PERCENT: 40.8 % (ref 20–42)
MCH RBC QN AUTO: 27.9 PG (ref 26–35)
MCHC RBC AUTO-ENTMCNC: 33.2 % (ref 32–34.5)
MCV RBC AUTO: 84.2 FL (ref 80–99.9)
METER GLUCOSE: 235 MG/DL (ref 74–99)
METER GLUCOSE: 314 MG/DL (ref 74–99)
MONOCYTES ABSOLUTE: 0.42 E9/L (ref 0.1–0.95)
MONOCYTES RELATIVE PERCENT: 5.8 % (ref 2–12)
MUCUS: PRESENT /LPF
NEUTROPHILS ABSOLUTE: 3.77 E9/L (ref 1.8–7.3)
NEUTROPHILS RELATIVE PERCENT: 52.4 % (ref 43–80)
NITRITE, URINE: NEGATIVE
PDW BLD-RTO: 12.7 FL (ref 11.5–15)
PH UA: 5.5 (ref 5–9)
PH VENOUS: 7.33 (ref 7.35–7.45)
PLATELET # BLD: 299 E9/L (ref 130–450)
PMV BLD AUTO: 11.6 FL (ref 7–12)
POTASSIUM REFLEX MAGNESIUM: 4.3 MMOL/L (ref 3.5–5)
PROTEIN UA: ABNORMAL MG/DL
RBC # BLD: 4.48 E12/L (ref 3.5–5.5)
RBC UA: ABNORMAL /HPF (ref 0–2)
SODIUM BLD-SCNC: 131 MMOL/L (ref 132–146)
SPECIFIC GRAVITY UA: 1.02 (ref 1–1.03)
TOTAL PROTEIN: 7.6 G/DL (ref 6.4–8.3)
UROBILINOGEN, URINE: 0.2 E.U./DL
WBC # BLD: 7.2 E9/L (ref 4.5–11.5)
WBC UA: ABNORMAL /HPF (ref 0–5)

## 2020-10-07 PROCEDURE — 96375 TX/PRO/DX INJ NEW DRUG ADDON: CPT

## 2020-10-07 PROCEDURE — 82962 GLUCOSE BLOOD TEST: CPT

## 2020-10-07 PROCEDURE — 82800 BLOOD PH: CPT

## 2020-10-07 PROCEDURE — 6370000000 HC RX 637 (ALT 250 FOR IP): Performed by: EMERGENCY MEDICINE

## 2020-10-07 PROCEDURE — 2580000003 HC RX 258: Performed by: STUDENT IN AN ORGANIZED HEALTH CARE EDUCATION/TRAINING PROGRAM

## 2020-10-07 PROCEDURE — 99283 EMERGENCY DEPT VISIT LOW MDM: CPT

## 2020-10-07 PROCEDURE — 83690 ASSAY OF LIPASE: CPT

## 2020-10-07 PROCEDURE — 80053 COMPREHEN METABOLIC PANEL: CPT

## 2020-10-07 PROCEDURE — 96374 THER/PROPH/DIAG INJ IV PUSH: CPT

## 2020-10-07 PROCEDURE — 36556 INSERT NON-TUNNEL CV CATH: CPT

## 2020-10-07 PROCEDURE — 2580000003 HC RX 258: Performed by: EMERGENCY MEDICINE

## 2020-10-07 PROCEDURE — 93005 ELECTROCARDIOGRAM TRACING: CPT | Performed by: STUDENT IN AN ORGANIZED HEALTH CARE EDUCATION/TRAINING PROGRAM

## 2020-10-07 PROCEDURE — 81001 URINALYSIS AUTO W/SCOPE: CPT

## 2020-10-07 PROCEDURE — 85025 COMPLETE CBC W/AUTO DIFF WBC: CPT

## 2020-10-07 PROCEDURE — 83605 ASSAY OF LACTIC ACID: CPT

## 2020-10-07 PROCEDURE — 84703 CHORIONIC GONADOTROPIN ASSAY: CPT

## 2020-10-07 PROCEDURE — 99284 EMERGENCY DEPT VISIT MOD MDM: CPT

## 2020-10-07 PROCEDURE — 82010 KETONE BODYS QUAN: CPT

## 2020-10-07 PROCEDURE — 6360000002 HC RX W HCPCS: Performed by: STUDENT IN AN ORGANIZED HEALTH CARE EDUCATION/TRAINING PROGRAM

## 2020-10-07 RX ORDER — MORPHINE SULFATE 4 MG/ML
4 INJECTION, SOLUTION INTRAMUSCULAR; INTRAVENOUS ONCE
Status: COMPLETED | OUTPATIENT
Start: 2020-10-07 | End: 2020-10-07

## 2020-10-07 RX ORDER — METOCLOPRAMIDE 10 MG/1
10 TABLET ORAL 4 TIMES DAILY
Qty: 30 TABLET | Refills: 3 | Status: SHIPPED | OUTPATIENT
Start: 2020-10-07 | End: 2020-11-10

## 2020-10-07 RX ORDER — NAPROXEN 250 MG/1
250 TABLET ORAL 2 TIMES DAILY WITH MEALS
Qty: 30 TABLET | Refills: 0 | Status: SHIPPED | OUTPATIENT
Start: 2020-10-07 | End: 2020-11-10

## 2020-10-07 RX ORDER — ONDANSETRON 2 MG/ML
4 INJECTION INTRAMUSCULAR; INTRAVENOUS ONCE
Status: COMPLETED | OUTPATIENT
Start: 2020-10-07 | End: 2020-10-07

## 2020-10-07 RX ORDER — 0.9 % SODIUM CHLORIDE 0.9 %
1000 INTRAVENOUS SOLUTION INTRAVENOUS ONCE
Status: COMPLETED | OUTPATIENT
Start: 2020-10-07 | End: 2020-10-07

## 2020-10-07 RX ORDER — NAPROXEN 250 MG/1
500 TABLET ORAL ONCE
Status: COMPLETED | OUTPATIENT
Start: 2020-10-07 | End: 2020-10-07

## 2020-10-07 RX ORDER — 0.9 % SODIUM CHLORIDE 0.9 %
500 INTRAVENOUS SOLUTION INTRAVENOUS ONCE
Status: COMPLETED | OUTPATIENT
Start: 2020-10-07 | End: 2020-10-07

## 2020-10-07 RX ADMIN — NAPROXEN 500 MG: 250 TABLET ORAL at 18:00

## 2020-10-07 RX ADMIN — SODIUM CHLORIDE 500 ML: 9 INJECTION, SOLUTION INTRAVENOUS at 17:36

## 2020-10-07 RX ADMIN — ONDANSETRON 4 MG: 2 INJECTION INTRAMUSCULAR; INTRAVENOUS at 16:10

## 2020-10-07 RX ADMIN — MORPHINE SULFATE 4 MG: 4 INJECTION, SOLUTION INTRAMUSCULAR; INTRAVENOUS at 16:11

## 2020-10-07 RX ADMIN — SODIUM CHLORIDE 1000 ML: 9 INJECTION, SOLUTION INTRAVENOUS at 16:09

## 2020-10-07 ASSESSMENT — ENCOUNTER SYMPTOMS
SHORTNESS OF BREATH: 0
ABDOMINAL DISTENTION: 0
NAUSEA: 1
SORE THROAT: 0
WHEEZING: 0
CHEST TIGHTNESS: 0
BLOOD IN STOOL: 0
BACK PAIN: 1
DIARRHEA: 1
RHINORRHEA: 0
ABDOMINAL PAIN: 1
COUGH: 0
VOMITING: 0

## 2020-10-07 ASSESSMENT — PAIN DESCRIPTION - FREQUENCY: FREQUENCY: CONTINUOUS

## 2020-10-07 ASSESSMENT — PAIN DESCRIPTION - PROGRESSION: CLINICAL_PROGRESSION: GRADUALLY IMPROVING

## 2020-10-07 ASSESSMENT — PAIN DESCRIPTION - DESCRIPTORS: DESCRIPTORS: ACHING

## 2020-10-07 ASSESSMENT — PAIN SCALES - GENERAL
PAINLEVEL_OUTOF10: 5
PAINLEVEL_OUTOF10: 10
PAINLEVEL_OUTOF10: 10
PAINLEVEL_OUTOF10: 7

## 2020-10-07 ASSESSMENT — PAIN DESCRIPTION - LOCATION
LOCATION: ABDOMEN
LOCATION: ABDOMEN;BACK

## 2020-10-07 ASSESSMENT — PAIN DESCRIPTION - PAIN TYPE: TYPE: ACUTE PAIN

## 2020-10-07 NOTE — ED NOTES
Bed: 06  Expected date:   Expected time:   Means of arrival:   Comments:      Bill Guzman  10/07/20 6918

## 2020-10-07 NOTE — ED PROVIDER NOTES
[facility]  Department of Emergency Medicine     Written by: Jonah Mcdaniel DO  Patient Name: Markus Barillas  Attending Provider: No att. providers found  Admit Date: 10/7/2020  3:01 PM  MRN: [id number]                   : 1995        Chief Complaint   Patient presents with    Emesis    Hyperglycemia    Back Pain    - Chief complaint    Patient is a 82-year-old female with a diagnosis of type I DM with an insulin pump, history of DKA episodes, most recently on  seen here in this emergency department for DKA. Patient presents today due to nausea without vomiting, and several episodes of diarrhea which began yesterday at an unspecified time have since been worsening. The symptoms are moderate in severity, not made better or worse by anything in particular. Patient states her blood glucose this morning was 204. Her most recent insulin pump adjustments were made around the end of September, she has had no issues with it. She denies any recent illnesses, fevers, chills, vomiting, black or bloody stools, changes in urination or thirst, numbness or tingling of any of her extremities. She does admit to lightheadedness and spotty vision upon standing this morning. The history is provided by the patient. Review of Systems   Constitutional: Positive for chills, fatigue and unexpected weight change (Over the past year). Negative for fever. HENT: Negative for congestion, rhinorrhea and sore throat. Eyes: Negative for visual disturbance. Respiratory: Negative for cough, chest tightness, shortness of breath and wheezing. Cardiovascular: Negative for chest pain, palpitations and leg swelling. Gastrointestinal: Positive for abdominal pain (generalized), diarrhea and nausea. Negative for abdominal distention, blood in stool and vomiting. Endocrine: Negative for polydipsia and polyuria. Genitourinary: Negative for difficulty urinating, dysuria and frequency. Musculoskeletal: Positive for back pain (\"Since January\") and myalgias (lower extremities). Negative for neck pain and neck stiffness. Skin: Negative for pallor, rash and wound. Neurological: Positive for light-headedness (on standing). Negative for dizziness and headaches. Psychiatric/Behavioral: Negative for confusion. The patient is not nervous/anxious. Physical Exam  Vitals signs and nursing note reviewed. Constitutional:       Appearance: She is ill-appearing. Comments: Patient appears mildly cachectic, fatigued, and is laying on her side in the fetal position. HENT:      Head: Normocephalic and atraumatic. Right Ear: External ear normal.      Left Ear: External ear normal.      Nose: Nose normal. No rhinorrhea. Mouth/Throat:      Mouth: Mucous membranes are dry. Pharynx: Oropharynx is clear. Eyes:      Extraocular Movements: Extraocular movements intact. Conjunctiva/sclera: Conjunctivae normal.      Pupils: Pupils are equal, round, and reactive to light. Neck:      Musculoskeletal: Normal range of motion and neck supple. No neck rigidity or muscular tenderness. Cardiovascular:      Rate and Rhythm: Regular rhythm. Tachycardia present. Pulses: Normal pulses. Heart sounds: Normal heart sounds. Pulmonary:      Effort: Pulmonary effort is normal. No respiratory distress. Breath sounds: Normal breath sounds. No wheezing or rales. Abdominal:      General: Abdomen is flat. Bowel sounds are normal. There is no distension. Palpations: Abdomen is soft. Tenderness: There is abdominal tenderness. There is guarding (voluntary). There is no right CVA tenderness, left CVA tenderness or rebound. Musculoskeletal: Normal range of motion. General: Tenderness (\"crampy\") present. No swelling. Skin:     General: Skin is warm and dry. Capillary Refill: Capillary refill takes less than 2 seconds.       Coloration: Skin is not jaundiced or pale.   Neurological:      General: No focal deficit present. Mental Status: She is alert and oriented to person, place, and time. Sensory: No sensory deficit. Psychiatric:         Mood and Affect: Mood normal.         Behavior: Behavior normal.          Procedures     Peripheral Line Placement Procedure Note    Indication: Poor peripheral access, lack of vascular access    Consent: The patient provided verbal consent for this procedure. Procedure: The patient was positioned appropriately and the skin over the left arm was prepped with alcohol. A tourniquet was placed proximal to the vein's location and ultrasound was utilized to identify the vein. A 20 gauge angiocath was inserted into the vein with ultrasound guidance, with the catheter being visualized as it was advanced within the vein. Blood was obtained from the venous access site if needed and the site was secured with a leur lock and a tegaderm adhesive bandage. The patient tolerated the procedure well. Complications: None    Procedure performed by Kelsey Arroyo DO at 10/7/2020 at 9:55 PM    MDM  Number of Diagnoses or Management Options  Diarrhea, unspecified type:   Nausea:   Diagnosis management comments: 22-year-old female with type 1 diabetes presents to the ED due to abdominal pain, nausea without vomiting, several episodes of diarrhea, all of which began yesterday and unspecified time. She was last seen in this ED on 8/30 for DKA. Upon arrival patient was tachycardic in the 110s, afebrile, with blood pressure 102/68. She is noted to have a history of difficult IV access, on her last encounter in the ED she required central line placement; Dr. Rigo Archuleta was able to place an ultrasound-guided peripheral IV with good access. Once access was obtained, patient's labs were drawn and 1 L IV NS bolus was started. POC glucose was found to be 314, venous pH was 7.33, patient was not in DKA on this Encounter.   CBC was normal, blood glucose was found to be 320, potassium 4.3, sodium 131, lactic acid is 1.7, UA revealed glucose >=1000, ketones >=80. Patient was treated with Zofran and 4 mg morphine, which relieved her pain. Initially decision was made to admit this patient for observation, however I spoke with Dr. Дмитрий Garza who felt this patient may benefit from another liter of fluid in the ED and reassessment, instructed to notify him if symptoms do not improve at that time. Another liter of IV normal saline bolus was given, patient was reevaluated and it seems that she is stable enough to go home. She is requesting a prescription for narcotics however Dr. Danilo Celis feels that this is not warranted given the patient's reassessment and exam findings, decision to discharge the patient with prescription for Naprosyn and Reglan and instructions to follow-up with her primary care physician. After speaking with the patient she is amenable to this plan. Patient remained hemodynamically stable throughout this encounter. Patient was made aware of signs/symptoms for which to return to the ER. Amount and/or Complexity of Data Reviewed  Decide to obtain previous medical records or to obtain history from someone other than the patient: yes             I have discussed this patient with my attending, who has seen the patient and agrees with this disposition.     ED Course as of Oct 07 2155   Wed Oct 07, 2020   1547 POCT glucoe 314    [VG]   1603 pH, Christoph(!): 7.33 [VG]   1604 Sodium(!): 131 [VG]   26 Spoke with Dr. Дмитрий Garza, discussed patient and our plan for possible admission to observation overnight, he feels that she may benefit from another liter of fluids and watching in the ED, if her symptoms do not improve at that time to notify him and he will admit her.     [VG]   1851 Glucose: 235 [VG]   1851 Lactic Acid: 1.7 [VG]      ED Course User Index  [VG] Obed Granado DO            Patient was seen and evaluated by myself and my attending No att. providers found. Assessment and Plan discussed with attending provider, please see attestation for final plan of care. ED Course as of Oct 07 2155   Wed Oct 07, 2020   1547 POCT glucoe 314    [VG]   1603 pH, Christoph(!): 7.33 [VG]   1604 Sodium(!): 131 [VG]   26 Spoke with Dr. Enedina Smith, discussed patient and our plan for possible admission to observation overnight, he feels that she may benefit from another liter of fluids and watching in the ED, if her symptoms do not improve at that time to notify him and he will admit her.     [VG]   1851 Glucose: 235 [VG]   1851 Lactic Acid: 1.7 [VG]      ED Course User Index  [VG] Charo James DO       --------------------------------------------- PAST HISTORY ---------------------------------------------  Past Medical History:  has a past medical history of Bleeding at insertion site, Common femoral artery injury, right, initial encounter, and DM type 1 (diabetes mellitus, type 1) ([facility] [address] Past Surgical History:  has a past surgical history that includes Abdomen surgery (N/A, 5/9/2019) and Bartholin gland cyst excision. Social History:  reports that she has never smoked. She has never used smokeless tobacco. She reports that she does not drink alcohol or use drugs. Family History: family history includes Diabetes in her maternal grandmother and paternal grandmother; Stroke in an other family member. The patients home medications have been reviewed. Allergies: Patient has no known allergies.     -------------------------------------------------- RESULTS -------------------------------------------------  Labs:  Results for orders placed or performed during the hospital encounter of 10/07/20   CBC Auto Differential   Result Value Ref Range    WBC 7.2 4.5 - 11.5 E9/L    RBC 4.48 3.50 - 5.50 E12/L    Hemoglobin 12.5 11.5 - 15.5 g/dL    Hematocrit 37.7 34.0 - 48.0 %    MCV 84.2 80.0 - 99.9 fL    MCH 27.9 26.0 - 35.0 pg    MCHC 33.2 32.0 - 34.5 %    RDW 12.7 11.5 - 15.0 fL    Platelets 124 609 - 656 E9/L    MPV 11.6 7.0 - 12.0 fL    Neutrophils % 52.4 43.0 - 80.0 %    Immature Granulocytes % 0.3 0.0 - 5.0 %    Lymphocytes % 40.8 20.0 - 42.0 %    Monocytes % 5.8 2.0 - 12.0 %    Eosinophils % 0.1 0.0 - 6.0 %    Basophils % 0.6 0.0 - 2.0 %    Neutrophils Absolute 3.77 1.80 - 7.30 E9/L    Immature Granulocytes # 0.02 E9/L    Lymphocytes Absolute 2.93 1.50 - 4.00 E9/L    Monocytes Absolute 0.42 0.10 - 0.95 E9/L    Eosinophils Absolute 0.01 (L) 0.05 - 0.50 E9/L    Basophils Absolute 0.04 0.00 - 0.20 E9/L   Comprehensive Metabolic Panel w/ Reflex to MG   Result Value Ref Range    Sodium 131 (L) 132 - 146 mmol/L    Potassium reflex Magnesium 4.3 3.5 - 5.0 mmol/L    Chloride 94 (L) 98 - 107 mmol/L    CO2 22 22 - 29 mmol/L    Anion Gap 15 7 - 16 mmol/L    Glucose 320 (H) 74 - 99 mg/dL    BUN 25 (H) 6 - 20 mg/dL    CREATININE 0.6 0.5 - 1.0 mg/dL    GFR Non-African American >60 >=60 mL/min/1.73    GFR African American >60     Calcium 10.2 8.6 - 10.2 mg/dL    Total Protein 7.6 6.4 - 8.3 g/dL    Alb 4.1 3.5 - 5.2 g/dL    Total Bilirubin 0.6 0.0 - 1.2 mg/dL    Alkaline Phosphatase 83 35 - 104 U/L    ALT 23 0 - 32 U/L    AST 28 0 - 31 U/L   Lipase   Result Value Ref Range    Lipase 8 (L) 13 - 60 U/L   Urinalysis, reflex to microscopic   Result Value Ref Range    Color, UA Yellow Straw/Yellow    Clarity, UA Clear Clear    Glucose, Ur >=1000 (A) Negative mg/dL    Bilirubin Urine SMALL (A) Negative    Ketones, Urine >=80 (A) Negative mg/dL    Specific Gravity, UA 1.020 1.005 - 1.030    Blood, Urine Negative Negative    pH, UA 5.5 5.0 - 9.0    Protein, UA TRACE Negative mg/dL    Urobilinogen, Urine 0.2 <2.0 E.U./dL    Nitrite, Urine Negative Negative    Leukocyte Esterase, Urine Negative Negative   Lactic Acid, Plasma   Result Value Ref Range    Lactic Acid 1.7 0.5 - 2.2 mmol/L   HCG Qualitative, Serum   Result Value Ref Range    hCG Qual NEGATIVE NEGATIVE   Beta-Hydroxybutyrate Result Value Ref Range    Beta-Hydroxybutyrate 2.95 (H) 0.02 - 0.27 mmol/L   pH, venous   Result Value Ref Range    pH, Christoph 7.33 (L) 7.35 - 7.45   Microscopic Urinalysis   Result Value Ref Range    Hyaline Casts, UA 0-2 0 - 2 /LPF    Mucus, UA Present (A) None Seen /LPF    WBC, UA 0-1 0 - 5 /HPF    RBC, UA 0-1 0 - 2 /HPF    Epithelial Cells, UA MANY /HPF    Bacteria, UA FEW (A) None Seen /HPF   POCT glucose   Result Value Ref Range    Glucose 314 mg/dL    QC OK? y    POCT Glucose   Result Value Ref Range    Meter Glucose 314 (H) 74 - 99 mg/dL   POCT glucose   Result Value Ref Range    Glucose 235 mg/dL    QC OK? okay    POCT Glucose   Result Value Ref Range    Meter Glucose 235 (H) 74 - 99 mg/dL   EKG 12 Lead   Result Value Ref Range    Ventricular Rate 118 BPM    Atrial Rate 118 BPM    P-R Interval 134 ms    QRS Duration 66 ms    Q-T Interval 248 ms    QTc Calculation (Bazett) 347 ms    P Axis 86 degrees    R Axis 68 degrees    T Axis 71 degrees       Radiology:  No orders to display       EKG: sinus tachycardia, rate 118, nonspecific EKG unchanged from previous EKG.    ------------------------- NURSING NOTES AND VITALS REVIEWED ---------------------------  Date / Time Roomed:  10/7/2020  3:01 PM  ED Bed Assignment:  06/06    The nursing notes within the ED encounter and vital signs as below have been reviewed. /68   Pulse 86   Temp 98.2 °F (36.8 °C) (Oral)   Resp 14   Wt 87 lb (39.5 kg)   SpO2 99%   BMI 16.44 kg/m²   Oxygen Saturation Interpretation: Normal      ------------------------------------------ PROGRESS NOTES ------------------------------------------  9:54 PM EDT  I have spoken with the patient and discussed todays results, in addition to providing specific details for the plan of care and counseling regarding the diagnosis and prognosis. Their questions are answered at this time and they are agreeable with the plan.  I discussed at length with them reasons for immediate return here for re evaluation. They will followup with their primary care physician by calling their office tomorrow. --------------------------------- ADDITIONAL PROVIDER NOTES ---------------------------------  At this time the patient is without objective evidence of an acute process requiring hospitalization or inpatient management. They have remained hemodynamically stable throughout their entire ED visit and are stable for discharge with outpatient follow-up. The plan has been discussed in detail and they are aware of the specific conditions for emergent return, as well as the importance of follow-up. Discharge Medication List as of 10/7/2020  7:10 PM      START taking these medications    Details   naproxen (NAPROSYN) 250 MG tablet Take 1 tablet by mouth 2 times daily (with meals), Disp-30 tablet,R-0Print      metoclopramide (REGLAN) 10 MG tablet Take 1 tablet by mouth 4 times daily, Disp-30 tablet,R-3Print             Diagnosis:  1. Nausea Uncertain Status   2. Diarrhea, unspecified type Uncertain Status       Disposition:  Patient's disposition: Discharge to home  Patient's condition is stable.        Chuck , DO  Resident  10/07/20 4218

## 2020-10-08 ENCOUNTER — HOSPITAL ENCOUNTER (INPATIENT)
Age: 25
LOS: 1 days | Discharge: HOME HEALTH | DRG: 137 | End: 2020-10-09
Attending: EMERGENCY MEDICINE | Admitting: INTERNAL MEDICINE
Payer: COMMERCIAL

## 2020-10-08 ENCOUNTER — APPOINTMENT (OUTPATIENT)
Dept: GENERAL RADIOLOGY | Age: 25
DRG: 137 | End: 2020-10-08
Payer: COMMERCIAL

## 2020-10-08 LAB
ALBUMIN SERPL-MCNC: 4.3 G/DL (ref 3.5–5.2)
ALP BLD-CCNC: 93 U/L (ref 35–104)
ALT SERPL-CCNC: 21 U/L (ref 0–32)
ANION GAP SERPL CALCULATED.3IONS-SCNC: 19 MMOL/L (ref 7–16)
ANION GAP SERPL CALCULATED.3IONS-SCNC: 24 MMOL/L (ref 7–16)
ANION GAP SERPL CALCULATED.3IONS-SCNC: 33 MMOL/L (ref 7–16)
AST SERPL-CCNC: 21 U/L (ref 0–31)
BACTERIA: ABNORMAL /HPF
BASOPHILS ABSOLUTE: 0.07 E9/L (ref 0–0.2)
BASOPHILS RELATIVE PERCENT: 0.4 % (ref 0–2)
BETA-HYDROXYBUTYRATE: >4.5 MMOL/L (ref 0.02–0.27)
BILIRUB SERPL-MCNC: 0.5 MG/DL (ref 0–1.2)
BILIRUBIN URINE: NEGATIVE
BLOOD, URINE: NEGATIVE
BUN BLDV-MCNC: 19 MG/DL (ref 6–20)
BUN BLDV-MCNC: 25 MG/DL (ref 6–20)
BUN BLDV-MCNC: 27 MG/DL (ref 6–20)
CALCIUM SERPL-MCNC: 10.5 MG/DL (ref 8.6–10.2)
CALCIUM SERPL-MCNC: 9.2 MG/DL (ref 8.6–10.2)
CALCIUM SERPL-MCNC: 9.2 MG/DL (ref 8.6–10.2)
CHLORIDE BLD-SCNC: 107 MMOL/L (ref 98–107)
CHLORIDE BLD-SCNC: 107 MMOL/L (ref 98–107)
CHLORIDE BLD-SCNC: 95 MMOL/L (ref 98–107)
CLARITY: ABNORMAL
CO2: 7 MMOL/L (ref 22–29)
CO2: 7 MMOL/L (ref 22–29)
CO2: 8 MMOL/L (ref 22–29)
COLOR: YELLOW
CREAT SERPL-MCNC: 0.6 MG/DL (ref 0.5–1)
CREAT SERPL-MCNC: 0.7 MG/DL (ref 0.5–1)
CREAT SERPL-MCNC: 0.8 MG/DL (ref 0.5–1)
D DIMER: <200 NG/ML DDU
EOSINOPHILS ABSOLUTE: 0 E9/L (ref 0.05–0.5)
EOSINOPHILS RELATIVE PERCENT: 0 % (ref 0–6)
EPITHELIAL CELLS, UA: ABNORMAL /HPF
FERRITIN: 39 NG/ML
GFR AFRICAN AMERICAN: >60
GFR NON-AFRICAN AMERICAN: >60 ML/MIN/1.73
GLUCOSE BLD-MCNC: 309 MG/DL (ref 74–99)
GLUCOSE BLD-MCNC: 483 MG/DL (ref 74–99)
GLUCOSE BLD-MCNC: 662 MG/DL (ref 74–99)
GLUCOSE URINE: >=1000 MG/DL
HCG(URINE) PREGNANCY TEST: NEGATIVE
HCT VFR BLD CALC: 37.9 % (ref 34–48)
HEMOGLOBIN: 12 G/DL (ref 11.5–15.5)
IMMATURE GRANULOCYTES #: 0.11 E9/L
IMMATURE GRANULOCYTES %: 0.6 % (ref 0–5)
KETONES, URINE: >=80 MG/DL
LACTIC ACID: 2.1 MMOL/L (ref 0.5–2.2)
LACTIC ACID: 2.5 MMOL/L (ref 0.5–2.2)
LACTIC ACID: 4.7 MMOL/L (ref 0.5–2.2)
LEUKOCYTE ESTERASE, URINE: NEGATIVE
LIPASE: 9 U/L (ref 13–60)
LYMPHOCYTES ABSOLUTE: 1.55 E9/L (ref 1.5–4)
LYMPHOCYTES RELATIVE PERCENT: 7.8 % (ref 20–42)
MAGNESIUM: 1.7 MG/DL (ref 1.6–2.6)
MAGNESIUM: 1.8 MG/DL (ref 1.6–2.6)
MCH RBC QN AUTO: 27.8 PG (ref 26–35)
MCHC RBC AUTO-ENTMCNC: 31.7 % (ref 32–34.5)
MCV RBC AUTO: 87.9 FL (ref 80–99.9)
METER GLUCOSE: 234 MG/DL (ref 74–99)
METER GLUCOSE: 268 MG/DL (ref 74–99)
METER GLUCOSE: 286 MG/DL (ref 74–99)
METER GLUCOSE: 288 MG/DL (ref 74–99)
METER GLUCOSE: 308 MG/DL (ref 74–99)
METER GLUCOSE: >500 MG/DL (ref 74–99)
METER GLUCOSE: >500 MG/DL (ref 74–99)
MONOCYTES ABSOLUTE: 0.89 E9/L (ref 0.1–0.95)
MONOCYTES RELATIVE PERCENT: 4.5 % (ref 2–12)
NEUTROPHILS ABSOLUTE: 17.32 E9/L (ref 1.8–7.3)
NEUTROPHILS RELATIVE PERCENT: 86.7 % (ref 43–80)
NITRITE, URINE: NEGATIVE
PDW BLD-RTO: 13.1 FL (ref 11.5–15)
PH UA: 5.5 (ref 5–9)
PH VENOUS: 7.07 (ref 7.35–7.45)
PHOSPHORUS: 3.8 MG/DL (ref 2.5–4.5)
PHOSPHORUS: 5.1 MG/DL (ref 2.5–4.5)
PLATELET # BLD: 339 E9/L (ref 130–450)
PMV BLD AUTO: 11.5 FL (ref 7–12)
POTASSIUM SERPL-SCNC: 4.5 MMOL/L (ref 3.5–5)
POTASSIUM SERPL-SCNC: 4.9 MMOL/L (ref 3.5–5)
POTASSIUM SERPL-SCNC: 5.1 MMOL/L (ref 3.5–5)
PROTEIN UA: NEGATIVE MG/DL
RBC # BLD: 4.31 E12/L (ref 3.5–5.5)
RBC UA: ABNORMAL /HPF (ref 0–2)
SARS-COV-2, NAAT: DETECTED
SODIUM BLD-SCNC: 134 MMOL/L (ref 132–146)
SODIUM BLD-SCNC: 135 MMOL/L (ref 132–146)
SODIUM BLD-SCNC: 138 MMOL/L (ref 132–146)
SPECIFIC GRAVITY UA: 1.02 (ref 1–1.03)
TOTAL CK: 282 U/L (ref 20–180)
TOTAL PROTEIN: 7.7 G/DL (ref 6.4–8.3)
UROBILINOGEN, URINE: 0.2 E.U./DL
WBC # BLD: 19.9 E9/L (ref 4.5–11.5)
WBC UA: ABNORMAL /HPF (ref 0–5)

## 2020-10-08 PROCEDURE — 82800 BLOOD PH: CPT

## 2020-10-08 PROCEDURE — 81001 URINALYSIS AUTO W/SCOPE: CPT

## 2020-10-08 PROCEDURE — U0002 COVID-19 LAB TEST NON-CDC: HCPCS

## 2020-10-08 PROCEDURE — 83605 ASSAY OF LACTIC ACID: CPT

## 2020-10-08 PROCEDURE — 6360000002 HC RX W HCPCS: Performed by: EMERGENCY MEDICINE

## 2020-10-08 PROCEDURE — 87186 SC STD MICRODIL/AGAR DIL: CPT

## 2020-10-08 PROCEDURE — 6360000002 HC RX W HCPCS: Performed by: INTERNAL MEDICINE

## 2020-10-08 PROCEDURE — 6370000000 HC RX 637 (ALT 250 FOR IP): Performed by: EMERGENCY MEDICINE

## 2020-10-08 PROCEDURE — 80048 BASIC METABOLIC PNL TOTAL CA: CPT

## 2020-10-08 PROCEDURE — 85025 COMPLETE CBC W/AUTO DIFF WBC: CPT

## 2020-10-08 PROCEDURE — 82728 ASSAY OF FERRITIN: CPT

## 2020-10-08 PROCEDURE — 71045 X-RAY EXAM CHEST 1 VIEW: CPT

## 2020-10-08 PROCEDURE — 96372 THER/PROPH/DIAG INJ SC/IM: CPT

## 2020-10-08 PROCEDURE — 81025 URINE PREGNANCY TEST: CPT

## 2020-10-08 PROCEDURE — 2500000003 HC RX 250 WO HCPCS: Performed by: INTERNAL MEDICINE

## 2020-10-08 PROCEDURE — 87077 CULTURE AEROBIC IDENTIFY: CPT

## 2020-10-08 PROCEDURE — 80053 COMPREHEN METABOLIC PANEL: CPT

## 2020-10-08 PROCEDURE — 2000000000 HC ICU R&B

## 2020-10-08 PROCEDURE — 36415 COLL VENOUS BLD VENIPUNCTURE: CPT

## 2020-10-08 PROCEDURE — 83690 ASSAY OF LIPASE: CPT

## 2020-10-08 PROCEDURE — 2580000003 HC RX 258: Performed by: EMERGENCY MEDICINE

## 2020-10-08 PROCEDURE — 96374 THER/PROPH/DIAG INJ IV PUSH: CPT

## 2020-10-08 PROCEDURE — 6370000000 HC RX 637 (ALT 250 FOR IP): Performed by: STUDENT IN AN ORGANIZED HEALTH CARE EDUCATION/TRAINING PROGRAM

## 2020-10-08 PROCEDURE — 86140 C-REACTIVE PROTEIN: CPT

## 2020-10-08 PROCEDURE — 99283 EMERGENCY DEPT VISIT LOW MDM: CPT

## 2020-10-08 PROCEDURE — 85378 FIBRIN DEGRADE SEMIQUANT: CPT

## 2020-10-08 PROCEDURE — 84100 ASSAY OF PHOSPHORUS: CPT

## 2020-10-08 PROCEDURE — 87081 CULTURE SCREEN ONLY: CPT

## 2020-10-08 PROCEDURE — 6360000002 HC RX W HCPCS: Performed by: STUDENT IN AN ORGANIZED HEALTH CARE EDUCATION/TRAINING PROGRAM

## 2020-10-08 PROCEDURE — 82962 GLUCOSE BLOOD TEST: CPT

## 2020-10-08 PROCEDURE — 83735 ASSAY OF MAGNESIUM: CPT

## 2020-10-08 PROCEDURE — 87040 BLOOD CULTURE FOR BACTERIA: CPT

## 2020-10-08 PROCEDURE — 99223 1ST HOSP IP/OBS HIGH 75: CPT | Performed by: INTERNAL MEDICINE

## 2020-10-08 PROCEDURE — 2580000003 HC RX 258: Performed by: INTERNAL MEDICINE

## 2020-10-08 PROCEDURE — 82550 ASSAY OF CK (CPK): CPT

## 2020-10-08 PROCEDURE — 82010 KETONE BODYS QUAN: CPT

## 2020-10-08 PROCEDURE — 87088 URINE BACTERIA CULTURE: CPT

## 2020-10-08 RX ORDER — MAGNESIUM SULFATE 1 G/100ML
1 INJECTION INTRAVENOUS PRN
Status: DISCONTINUED | OUTPATIENT
Start: 2020-10-08 | End: 2020-10-09

## 2020-10-08 RX ORDER — DEXTROSE MONOHYDRATE 25 G/50ML
12.5 INJECTION, SOLUTION INTRAVENOUS PRN
Status: DISCONTINUED | OUTPATIENT
Start: 2020-10-08 | End: 2020-10-09 | Stop reason: HOSPADM

## 2020-10-08 RX ORDER — NICOTINE POLACRILEX 4 MG
15 LOZENGE BUCCAL PRN
Status: DISCONTINUED | OUTPATIENT
Start: 2020-10-08 | End: 2020-10-09

## 2020-10-08 RX ORDER — MORPHINE SULFATE 2 MG/ML
2 INJECTION, SOLUTION INTRAMUSCULAR; INTRAVENOUS ONCE
Status: COMPLETED | OUTPATIENT
Start: 2020-10-08 | End: 2020-10-08

## 2020-10-08 RX ORDER — ORPHENADRINE CITRATE 30 MG/ML
60 INJECTION INTRAMUSCULAR; INTRAVENOUS ONCE
Status: DISCONTINUED | OUTPATIENT
Start: 2020-10-08 | End: 2020-10-09 | Stop reason: HOSPADM

## 2020-10-08 RX ORDER — SODIUM CHLORIDE 450 MG/100ML
INJECTION, SOLUTION INTRAVENOUS CONTINUOUS
Status: DISCONTINUED | OUTPATIENT
Start: 2020-10-08 | End: 2020-10-09

## 2020-10-08 RX ORDER — ONDANSETRON 2 MG/ML
4 INJECTION INTRAMUSCULAR; INTRAVENOUS EVERY 6 HOURS PRN
Status: DISCONTINUED | OUTPATIENT
Start: 2020-10-08 | End: 2020-10-09 | Stop reason: SDUPTHER

## 2020-10-08 RX ORDER — POTASSIUM CHLORIDE 7.45 MG/ML
10 INJECTION INTRAVENOUS PRN
Status: DISCONTINUED | OUTPATIENT
Start: 2020-10-08 | End: 2020-10-09

## 2020-10-08 RX ORDER — SODIUM CHLORIDE 0.9 % (FLUSH) 0.9 %
10 SYRINGE (ML) INJECTION PRN
Status: DISCONTINUED | OUTPATIENT
Start: 2020-10-08 | End: 2020-10-09

## 2020-10-08 RX ORDER — MORPHINE SULFATE 2 MG/ML
2 INJECTION, SOLUTION INTRAMUSCULAR; INTRAVENOUS EVERY 4 HOURS PRN
Status: DISCONTINUED | OUTPATIENT
Start: 2020-10-08 | End: 2020-10-09

## 2020-10-08 RX ORDER — PROMETHAZINE HYDROCHLORIDE 25 MG/ML
12.5 INJECTION, SOLUTION INTRAMUSCULAR; INTRAVENOUS ONCE
Status: COMPLETED | OUTPATIENT
Start: 2020-10-08 | End: 2020-10-08

## 2020-10-08 RX ORDER — LIDOCAINE 50 MG/G
1 PATCH TOPICAL DAILY
Status: DISCONTINUED | OUTPATIENT
Start: 2020-10-08 | End: 2020-10-08 | Stop reason: CLARIF

## 2020-10-08 RX ORDER — KETOROLAC TROMETHAMINE 30 MG/ML
15 INJECTION, SOLUTION INTRAMUSCULAR; INTRAVENOUS ONCE
Status: COMPLETED | OUTPATIENT
Start: 2020-10-08 | End: 2020-10-08

## 2020-10-08 RX ORDER — DEXTROSE, SODIUM CHLORIDE, AND POTASSIUM CHLORIDE 5; .45; .15 G/100ML; G/100ML; G/100ML
INJECTION INTRAVENOUS CONTINUOUS PRN
Status: DISPENSED | OUTPATIENT
Start: 2020-10-08 | End: 2020-10-09

## 2020-10-08 RX ORDER — SODIUM CHLORIDE 0.9 % (FLUSH) 0.9 %
10 SYRINGE (ML) INJECTION PRN
Status: DISCONTINUED | OUTPATIENT
Start: 2020-10-08 | End: 2020-10-08 | Stop reason: SDUPTHER

## 2020-10-08 RX ORDER — LIDOCAINE 4 G/G
1 PATCH TOPICAL DAILY
Status: DISCONTINUED | OUTPATIENT
Start: 2020-10-08 | End: 2020-10-09 | Stop reason: HOSPADM

## 2020-10-08 RX ORDER — DEXTROSE MONOHYDRATE 50 MG/ML
100 INJECTION, SOLUTION INTRAVENOUS PRN
Status: DISCONTINUED | OUTPATIENT
Start: 2020-10-08 | End: 2020-10-09

## 2020-10-08 RX ORDER — 0.9 % SODIUM CHLORIDE 0.9 %
500 INTRAVENOUS SOLUTION INTRAVENOUS ONCE
Status: COMPLETED | OUTPATIENT
Start: 2020-10-08 | End: 2020-10-08

## 2020-10-08 RX ORDER — 0.9 % SODIUM CHLORIDE 0.9 %
1000 INTRAVENOUS SOLUTION INTRAVENOUS ONCE
Status: COMPLETED | OUTPATIENT
Start: 2020-10-08 | End: 2020-10-08

## 2020-10-08 RX ADMIN — POTASSIUM CHLORIDE 10 MEQ: 7.46 INJECTION, SOLUTION INTRAVENOUS at 19:44

## 2020-10-08 RX ADMIN — ONDANSETRON 4 MG: 2 INJECTION INTRAMUSCULAR; INTRAVENOUS at 21:11

## 2020-10-08 RX ADMIN — POTASSIUM CHLORIDE 10 MEQ: 7.46 INJECTION, SOLUTION INTRAVENOUS at 18:24

## 2020-10-08 RX ADMIN — KETOROLAC TROMETHAMINE 15 MG: 30 INJECTION, SOLUTION INTRAMUSCULAR at 16:30

## 2020-10-08 RX ADMIN — SODIUM CHLORIDE 250 ML/HR: 4.5 INJECTION, SOLUTION INTRAVENOUS at 20:21

## 2020-10-08 RX ADMIN — SODIUM CHLORIDE 0.1 UNITS/KG/HR: 9 INJECTION, SOLUTION INTRAVENOUS at 13:35

## 2020-10-08 RX ADMIN — SODIUM CHLORIDE 1000 ML: 9 INJECTION, SOLUTION INTRAVENOUS at 12:54

## 2020-10-08 RX ADMIN — SODIUM CHLORIDE: 4.5 INJECTION, SOLUTION INTRAVENOUS at 16:28

## 2020-10-08 RX ADMIN — MORPHINE SULFATE 2 MG: 2 INJECTION, SOLUTION INTRAMUSCULAR; INTRAVENOUS at 12:54

## 2020-10-08 RX ADMIN — SODIUM BICARBONATE: 84 INJECTION, SOLUTION INTRAVENOUS at 22:57

## 2020-10-08 RX ADMIN — PROMETHAZINE HYDROCHLORIDE 12.5 MG: 25 INJECTION INTRAMUSCULAR; INTRAVENOUS at 12:54

## 2020-10-08 RX ADMIN — MORPHINE SULFATE 2 MG: 2 INJECTION, SOLUTION INTRAMUSCULAR; INTRAVENOUS at 21:03

## 2020-10-08 RX ADMIN — SODIUM CHLORIDE 500 ML: 9 INJECTION, SOLUTION INTRAVENOUS at 13:57

## 2020-10-08 ASSESSMENT — PAIN DESCRIPTION - PAIN TYPE
TYPE: CHRONIC PAIN

## 2020-10-08 ASSESSMENT — ENCOUNTER SYMPTOMS
BACK PAIN: 1
SINUS PRESSURE: 0
SHORTNESS OF BREATH: 1
EYE REDNESS: 0
VOMITING: 1
ABDOMINAL PAIN: 1
NAUSEA: 1
ABDOMINAL DISTENTION: 0
BACK PAIN: 0
EYE PAIN: 0
WHEEZING: 0
BLURRED VISION: 0
COUGH: 0
SHORTNESS OF BREATH: 0
SORE THROAT: 0
EYE DISCHARGE: 0
VOMITING: 0
DIARRHEA: 0

## 2020-10-08 ASSESSMENT — PAIN DESCRIPTION - LOCATION
LOCATION: BACK;ABDOMEN

## 2020-10-08 ASSESSMENT — PAIN SCALES - GENERAL
PAINLEVEL_OUTOF10: 6
PAINLEVEL_OUTOF10: 10
PAINLEVEL_OUTOF10: 6
PAINLEVEL_OUTOF10: 4
PAINLEVEL_OUTOF10: 9
PAINLEVEL_OUTOF10: 10

## 2020-10-08 ASSESSMENT — PAIN DESCRIPTION - FREQUENCY
FREQUENCY: CONTINUOUS

## 2020-10-08 ASSESSMENT — PAIN DESCRIPTION - DESCRIPTORS
DESCRIPTORS: ACHING

## 2020-10-08 NOTE — ACP (ADVANCE CARE PLANNING)
Advance Care Planning     Advance Care Planning Activator (Inpatient)  Conversation Note      Date of ACP Conversation: 10/8/2020    Conversation Conducted with: Patient with Decision Making Capacity    ACP Activator: juan carlos barrera    *When Decision Maker makes decisions on behalf of the incapacitated patient: Decision Maker is asked to consider and make decisions based on patient values, known preferences, or best interests. Health Care Decision Maker:     Current Designated Health Care Decision Maker:   Primary Decision Maker: Jeff Cook - Parent - [phone]    Secondary Decision Maker: Marnie Villela - Parent - [phone]  (If there is a [address] named in the [address] [facility] Makers\" box in the ACP activity, but it is not visible above, be sure to open that field and then select the health care decision maker relationship (ie \"primary\") in the blank space to the right of the name.) Validate  this information as still accurate & up-to-date; edit [employer] field as needed.)    Note: Assess and validate information in current ACP documents, as indicated. Care Preferences    Ventilation: \"If you were in your present state of health and suddenly became very ill and were unable to breathe on your own, what would your preference be about the use of a ventilator (breathing machine) if it were available to you? \"      Would the patient desire the use of ventilator (breathing machine)?: yes    \"If your health worsens and it becomes clear that your chance of recovery is unlikely, what would your preference be about the use of a ventilator (breathing machine) if it were available to you? \"     Would the patient desire the use of ventilator (breathing machine)?: Yes      Resuscitation  \"CPR works best to restart the heart when there is a sudden event, like a heart attack, in someone who is otherwise healthy.  Unfortunately, CPR does not typically restart the heart

## 2020-10-08 NOTE — ED PROVIDER NOTES
51-year-old female history of type 1 diabetes who was seen yesterday for evaluation presents to the emergency department with persistent nausea vomiting and now shortness of breath. During evaluation yesterday in which I saw her her work-up did show she was not in DKA though was near DKA we did discuss admission with the hospitalist at that time did not feel she warranted admission. She is instructed to restart her insulin pump and return if symptoms worsen. According nursing staff she did not restart her insulin pump and states symptoms worsen she is now complaining of shortness of breath. She states no fevers no chills but is complaining of abdominal pain nausea and vomiting. She states no urinary symptoms or other complaints at this time. The history is provided by the patient. Hyperglycemia   Severity:  Moderate  Onset quality:  Gradual  Duration:  3 days  Timing:  Intermittent  Progression:  Waxing and waning  Chronicity:  New  Diabetes status:  Controlled with insulin  Context: insulin pump use    Relieved by:  Nothing  Ineffective treatments:  None tried  Associated symptoms: abdominal pain, dehydration, nausea, polyuria and shortness of breath    Associated symptoms: no blurred vision, no chest pain, no confusion, no dysuria, no fever, no syncope, no vomiting, no weakness and no weight change    Risk factors: hx of DKA         Review of Systems   Constitutional: Negative for chills and fever. HENT: Negative for ear pain, sinus pressure and sore throat. Eyes: Negative for blurred vision, pain, discharge and redness. Respiratory: Positive for shortness of breath. Negative for cough and wheezing. Cardiovascular: Negative for chest pain and syncope. Gastrointestinal: Positive for abdominal pain and nausea. Negative for abdominal distention, diarrhea and vomiting. Endocrine: Positive for polyuria. Genitourinary: Negative for dysuria and frequency.    Musculoskeletal: Negative for arthralgias and back pain. Skin: Negative for rash and wound. Neurological: Negative for weakness and headaches. Hematological: Negative for adenopathy. Psychiatric/Behavioral: Negative for confusion. All other systems reviewed and are negative. Physical Exam  Constitutional:       Appearance: Normal appearance. HENT:      Head: Normocephalic and atraumatic. Right Ear: Tympanic membrane normal.      Left Ear: Tympanic membrane normal.      Mouth/Throat:      Mouth: Mucous membranes are moist.   Eyes:      Extraocular Movements: Extraocular movements intact. Pupils: Pupils are equal, round, and reactive to light. Cardiovascular:      Rate and Rhythm: Normal rate and regular rhythm. Pulses: Normal pulses. Heart sounds: Normal heart sounds. Pulmonary:      Effort: Pulmonary effort is normal.      Breath sounds: Normal breath sounds. Abdominal:      General: Abdomen is flat. Bowel sounds are normal.      Palpations: Abdomen is soft. Tenderness: There is generalized abdominal tenderness. Neurological:      Mental Status: She is alert and oriented to person, place, and time. Procedures   PROCEDURE  10/8/20       Time: 12:00    PERIPHERAL LINE INSERTION  Risks, benefits and alternatives (for applicable procedures below) described. Performed By: Elizabeth Angulo DO. Indication: inability to gain peripheral access. Informed consent: The patient provided verbal consent for this procedure. .  Procedure: After routine sterile preparation, a 20g catheter was placed using ultrasound guidance in right upper arm and secured by standard fashion. Ultrasound Guidance:   used. Number of Attempts: 1  Patient tolerated the procedure well. PROCEDURE  10/8/20       Time: 1321    PERIPHERAL LINE INSERTION  Risks, benefits and alternatives (for applicable procedures below) described. Performed By: Elizabeth Angulo DO.     Indication: inability to gain peripheral access. Informed consent: The patient provided verbal consent for this procedure. .  Procedure: After routine sterile preparation, a 20g catheter was placed using ultrasound guidance in left upper arm and secured by standard fashion. Ultrasound Guidance:   used. Number of Attempts: 1  Patient tolerated the procedure well. MDM  Number of Diagnoses or Management Options  COVID-19:   Diabetic ketoacidosis without coma associated with type 1 diabetes mellitus ([facility] [place] [address]):   Nausea and vomiting, intractability of vomiting not specified, unspecified vomiting type:   Diagnosis management comments: 74-year-old female history of type 1 diabetes was seen yesterday and evaluated but was not in DKA. She was hydrated and had improvement of symptoms and was felt safe for discharge. She presents back today with intractable nausea and vomiting but did not restart her insulin pump. Concern is for DKA. Peripheral IVs were placed. Labs and imaging were ordered and COVID test was performed. She was found to be in DKA and protocol was initiated with IV fluids. Cover test was positive. Case discussed with Dr. China Soto who will accept to the ICU and is aware now of the patient's COVID test.  Case discussed with hospitalist Dr. Oli Wilcox who will admit.             --------------------------------------------- PAST HISTORY ---------------------------------------------  Past Medical History:  has a past medical history of Bleeding at insertion site, Common femoral artery injury, right, initial encounter, and DM type 1 (diabetes mellitus, type 1) ([facility], [place] [address] Past Surgical History:  has a past surgical history that includes Abdomen surgery (N/A, 5/9/2019) and Bartholin gland cyst excision. Social History:  reports that she has never smoked. She has never used smokeless tobacco. She reports that she does not drink alcohol or use drugs.     Family History: family history includes Diabetes in her maternal grandmother and paternal grandmother; Stroke in an other family member. The patients home medications have been reviewed. Allergies: Patient has no known allergies.     -------------------------------------------------- RESULTS -------------------------------------------------    Lab  Results for orders placed or performed during the hospital encounter of 10/08/20   CBC Auto Differential   Result Value Ref Range    WBC 19.9 (H) 4.5 - 11.5 E9/L    RBC 4.31 3.50 - 5.50 E12/L    Hemoglobin 12.0 11.5 - 15.5 g/dL    Hematocrit 37.9 34.0 - 48.0 %    MCV 87.9 80.0 - 99.9 fL    MCH 27.8 26.0 - 35.0 pg    MCHC 31.7 (L) 32.0 - 34.5 %    RDW 13.1 11.5 - 15.0 fL    Platelets 775 197 - 292 E9/L    MPV 11.5 7.0 - 12.0 fL    Neutrophils % 86.7 (H) 43.0 - 80.0 %    Immature Granulocytes % 0.6 0.0 - 5.0 %    Lymphocytes % 7.8 (L) 20.0 - 42.0 %    Monocytes % 4.5 2.0 - 12.0 %    Eosinophils % 0.0 0.0 - 6.0 %    Basophils % 0.4 0.0 - 2.0 %    Neutrophils Absolute 17.32 (H) 1.80 - 7.30 E9/L    Immature Granulocytes # 0.11 E9/L    Lymphocytes Absolute 1.55 1.50 - 4.00 E9/L    Monocytes Absolute 0.89 0.10 - 0.95 E9/L    Eosinophils Absolute 0.00 (L) 0.05 - 0.50 E9/L    Basophils Absolute 0.07 0.00 - 0.20 E9/L   Comprehensive Metabolic Panel   Result Value Ref Range    Sodium 135 132 - 146 mmol/L    Potassium 5.1 (H) 3.5 - 5.0 mmol/L    Chloride 95 (L) 98 - 107 mmol/L    CO2 7 (LL) 22 - 29 mmol/L    Anion Gap 33 (H) 7 - 16 mmol/L    Glucose 662 (HH) 74 - 99 mg/dL    BUN 27 (H) 6 - 20 mg/dL    CREATININE 0.8 0.5 - 1.0 mg/dL    GFR Non-African American >60 >=60 mL/min/1.73    GFR African American >60     Calcium 10.5 (H) 8.6 - 10.2 mg/dL    Total Protein 7.7 6.4 - 8.3 g/dL    Alb 4.3 3.5 - 5.2 g/dL    Total Bilirubin 0.5 0.0 - 1.2 mg/dL    Alkaline Phosphatase 93 35 - 104 U/L    ALT 21 0 - 32 U/L    AST 21 0 - 31 U/L   Lipase   Result Value Ref Range    Lipase 9 (L) 13 - 60 U/L   Urinalysis   Result Value Ref Range    Color, UA Yellow Straw/Yellow Clarity, UA SL CLOUDY Clear    Glucose, Ur >=1000 (A) Negative mg/dL    Bilirubin Urine Negative Negative    Ketones, Urine >=80 (A) Negative mg/dL    Specific Gravity, UA 1.020 1.005 - 1.030    Blood, Urine Negative Negative    pH, UA 5.5 5.0 - 9.0    Protein, UA Negative Negative mg/dL    Urobilinogen, Urine 0.2 <2.0 E.U./dL    Nitrite, Urine Negative Negative    Leukocyte Esterase, Urine Negative Negative   Beta-Hydroxybutyrate   Result Value Ref Range    Beta-Hydroxybutyrate >4.50 (H) 0.02 - 0.27 mmol/L   pH, venous   Result Value Ref Range    pH, Christoph 7.07 (LL) 7.35 - 7.45   Lactic Acid, Plasma   Result Value Ref Range    Lactic Acid 4.7 (HH) 0.5 - 2.2 mmol/L   COVID-19   Result Value Ref Range    SARS-CoV-2, NAAT DETECTED (A) Not Detected   Microscopic Urinalysis   Result Value Ref Range    WBC, UA 0-1 0 - 5 /HPF    RBC, UA NONE 0 - 2 /HPF    Epithelial Cells, UA MODERATE /HPF    Bacteria, UA FEW (A) None Seen /HPF   POCT Glucose   Result Value Ref Range    Meter Glucose >500 (H) 74 - 99 mg/dL       Radiology  No results found.      ------------------------- NURSING NOTES AND VITALS REVIEWED ---------------------------  Date / Time Roomed:  10/8/2020 11:39 AM  ED Bed Assignment:  05/05    The nursing notes within the ED encounter and vital signs as below have been reviewed. Patient Vitals for the past 24 hrs:   BP Temp Temp src Pulse Resp SpO2 Height Weight   10/08/20 1339 (!) 101/56 98.9 °F (37.2 °C) Oral 121 16 96 % -- --   10/08/20 1216 (!) 103/58 98.9 °F (37.2 °C) Oral 122 16 95 % 5' 1\" (1.549 m) 87 lb (39.5 kg)       Oxygen Saturation Interpretation: Normal      ------------------------------------------ PROGRESS NOTES ------------------------------------------  I have spoken with the patient and discussed todays results, in addition to providing specific details for the plan of care and counseling regarding the diagnosis and prognosis.   Their questions are answered at this time and they are agreeable with the plan.      --------------------------------- ADDITIONAL PROVIDER NOTES ---------------------------------  This patient's ED course included: a personal history and physicial examination, re-evaluation prior to disposition, multiple bedside re-evaluations, IV medications, cardiac monitoring, continuous pulse oximetry and complex medical decision making and emergency management    This patient has improved during their ED course. Please note that the withdrawal or failure to initiate urgent interventions for this patient would likely result in a life threatening deterioration or permanent disability. Accordingly this patient received 30 minutes of critical care time, excluding separately billable procedures. Clinical Impression  1. Diabetic ketoacidosis without coma associated with type 1 diabetes mellitus ([facility] [address]    2. Nausea and vomiting, intractability of vomiting not specified, unspecified vomiting type    3. COVID-19          Disposition  Patient's disposition: Admit to CCU/ICU  Patient's condition is serious.          Hugh Lucas DO  10/08/20 144

## 2020-10-08 NOTE — PROGRESS NOTES
Patient admitted from ER to 215  with the following belongings cell phone, , purse, shirt pants under wear , 2 sets of earrings on and necklace placed on monitor, patient oriented to room and unit visiting hours. Patient guide at bedside, reviewed patient rights and responsibilities. MRSA nasal swab obtained. Bed alarm on. Call light within reach.

## 2020-10-08 NOTE — H&P
[place] [id number] [facility]   History and Physical      CHIEF COMPLAINT:  nausea    History of Present Illness: pt is a 27 y.o. female who presents to hospital for n/v. Pt has had diarrhea recently. Pt also notes some sob. Pt has had elevated blood sugars. Pt had c/o generalized abd pain. Pt has insulin pump. Pt notes back pain consistent with chronic back pain. Pt denies fevers, chills, cough, constipation, melena, hematochezia, dysuria, hematuria, or change in urination. REVIEW OF SYSTEMS:    A detailed system review was conducted with patient and is negative unless stated in hpi. PMH:  Past Medical History:   Diagnosis Date    Bleeding at insertion site 1/5/2018    Common femoral artery injury, right, initial encounter 1/5/2018    DM type 1 (diabetes mellitus, type 1) ([facility] [address]        Surgical History:  Past Surgical History:   Procedure Laterality Date    ABDOMEN SURGERY N/A 5/9/2019    INCISION AND DRAINAGE OF SUPRAPUBIC ABSESS performed by Vaishali Oliveros MD at [address] [place] [address]      last yr       Medications Prior to Admission:    Prior to Admission medications    Medication Sig Start Date End Date Taking? Authorizing Provider   naproxen (NAPROSYN) 250 MG tablet Take 1 tablet by mouth 2 times daily (with meals) 10/7/20   David Chung,    metoclopramide (REGLAN) 10 MG tablet Take 1 tablet by mouth 4 times daily 10/7/20   Lefty Polanco,    blood glucose test strips (CONTOUR NEXT TEST) strip Neelima Contour test strips.  Checks 4 times/day before meals and at bedtime and as needed for symptoms of irregular blood glucose 9/23/20   Janie Thompson MD   pantoprazole (PROTONIX) 40 MG tablet Take 1 tablet by mouth 2 times daily (before meals) 9/4/20 10/4/20  Roberta Rodney MD   insulin aspart (NOVOLOG) 100 UNIT/ML injection vial Inject into the skin 3 times daily (before meals) PER PUMP    Historical Provider, MD   blood glucose test strips dry  Vascular: 2/4 Dorsalis Pedis pulses bilaterally. Neuro: limited due to pt's status            LABS:  Recent Labs     10/07/20  1545  10/07/20  1844 10/08/20  1206 10/08/20  1530   *  --   --  135 138   K 4.3  --   --  5.1* 4.5   CL 94*  --   --  95* 107   CO2 22  --   --  7* 7*   BUN 25*  --   --  27* 25*   CREATININE 0.6  --   --  0.8 0.7   GLUCOSE 320*   < > 235 662* 483*   CALCIUM 10.2  --   --  10.5* 9.2    < > = values in this interval not displayed. Recent Labs     10/07/20  1545 10/08/20  1206   WBC 7.2 19.9*   RBC 4.48 4.31   HGB 12.5 12.0   HCT 37.7 37.9   MCV 84.2 87.9   MCH 27.9 27.8   MCHC 33.2 31.7*   RDW 12.7 13.1    339   MPV 11.6 11.5       No results for input(s): POCGLU in the last 72 hours.     CBC with Differential:    Lab Results   Component Value Date    WBC 19.9 10/08/2020    RBC 4.31 10/08/2020    HGB 12.0 10/08/2020    HCT 37.9 10/08/2020     10/08/2020    MCV 87.9 10/08/2020    MCH 27.8 10/08/2020    MCHC 31.7 10/08/2020    RDW 13.1 10/08/2020    NRBC 0.0 05/10/2019    SEGSPCT 58 09/29/2013    METASPCT 1.0 04/29/2020    LYMPHOPCT 7.8 10/08/2020    MONOPCT 4.5 10/08/2020    MYELOPCT 1.0 04/29/2020    BASOPCT 0.4 10/08/2020    MONOSABS 0.89 10/08/2020    LYMPHSABS 1.55 10/08/2020    EOSABS 0.00 10/08/2020    BASOSABS 0.07 10/08/2020     CMP:    Lab Results   Component Value Date     10/08/2020    K 4.5 10/08/2020    K 4.3 10/07/2020     10/08/2020    CO2 7 10/08/2020    BUN 25 10/08/2020    CREATININE 0.7 10/08/2020    GFRAA >60 10/08/2020    LABGLOM >60 10/08/2020    GLUCOSE 483 10/08/2020    PROT 7.7 10/08/2020    LABALBU 4.3 10/08/2020    CALCIUM 9.2 10/08/2020    BILITOT 0.5 10/08/2020    ALKPHOS 93 10/08/2020    AST 21 10/08/2020    ALT 21 10/08/2020     U/A:    Lab Results   Component Value Date    NITRITE negative 04/17/2018    COLORU Yellow 10/08/2020    PROTEINU Negative 10/08/2020    PHUR 5.5 10/08/2020    LABCAST FEW 01/22/2018    WBCUA 0-1 10/08/2020    RBCUA NONE 10/08/2020    MUCUS Present 10/07/2020    TRICHOMONAS Present 07/12/2020    YEAST Present 05/04/2020    BACTERIA FEW 10/08/2020    CLARITYU SL CLOUDY 10/08/2020    SPECGRAV 1.020 10/08/2020    LEUKOCYTESUR Negative 10/08/2020    UROBILINOGEN 0.2 10/08/2020    BILIRUBINUR Negative 10/08/2020    BILIRUBINUR negative 04/17/2018    BLOODU Negative 10/08/2020    GLUCOSEU >=1000 10/08/2020     LIPASE:    Lab Results   Component Value Date    LIPASE 9 10/08/2020       Radiology: Xr Chest Portable    Result Date: 10/8/2020  EXAMINATION: ONE XRAY VIEW OF THE CHEST 10/8/2020 12:53 pm COMPARISON: 08/31/2020 HISTORY: ORDERING SYSTEM PROVIDED HISTORY: eval for pneumonia TECHNOLOGIST PROVIDED HISTORY: Reason for exam:->eval for pneumonia FINDINGS: The mediastinal and cardiac contours are normal.  The lungs are clear. There is no focal consolidation, pleural effusion or pneumothorax evident. The bones are unremarkable. No acute cardiopulmonary process identified. ASSESSMENT:      Active Problems:    DKA, type 1, not at goal [facility])  Resolved Problems:    * No resolved hospital problems. *      PLAN:    1. dka  Admit to icu intensivist consulted. Insulin gtt, monitor lytes and replace lytes prn. Iv fluids. 2. covid 19 infection monitor pt closely for worsening clinical conditions, hypoxia, fever. 3. Dehydration iv fluids  4. Leukocytosis monitor  5. Elevated lactic acid level monitor  6. Hyperkalemia monitor.  Pt on insulin gtt            Electronically signed by Ze Goodman DO on 10/8/2020 at 7:53 PM

## 2020-10-08 NOTE — CONSULTS
Critical Care Admit/Consult Note         Patient - Gab Banegas   MRN -  [id number]   [place] # - [de-identified]   - 1995      Date of Admission -  10/8/2020 11:39 AM  Date of evaluation -  10/8/2020  021/0215-A   Hospital Day - 0            ADMIT/CONSULT DETAILS     Reason for Admit/Consult   DKA    Consulting Service/Physician   Consulting - [address]*  Primary Care Physician - DO TRINI Badillo   The patient is a 27 y.o. female with significant past medical history of type 1 diabetes. Patient presented to the emergency department with abdominal pain, nausea and vomiting. In the emergency department it was found that her blood glucose was elevated blood glucose of 662, anion gap of 33, beta hydroxybutyrate of greater than 4.5. The emergency department yesterday for an elevated blood sugar as well but was not admitted. Patient states that when she was discharged from the hospital yesterday she was told not to take her insulin because of the normal saline that she received. Here in the ICU patient is not complaining of any nausea or vomiting. But she is having some abdominal pain as well as back pain. Patient has been having back pain chronically since February. She states she has had a negative MRI of her back. In the emergency department patient had a COVID swab which returned positive. She is not complaining of any cough, chest pain, shortness of breath, fevers.          Past Medical History         Diagnosis Date    Bleeding at insertion site 2018    Common femoral artery injury, right, initial encounter 2018    DM type 1 (diabetes mellitus, type 1) ([facility] [address]         Past Surgical History           Procedure Laterality Date    ABDOMEN SURGERY N/A 2019    INCISION AND DRAINAGE OF SUPRAPUBIC ABSESS performed by Germán Bain MD at [address]      last yr         Lines and Devices   2 peripheral IVs    Current Medications Current Medications    enoxaparin  40 mg Subcutaneous Daily    orphenadrine  60 mg Intramuscular Once    ketorolac  15 mg Intravenous Once    lidocaine  1 patch Transdermal Daily     sodium chloride flush, glucose, dextrose, glucagon (rDNA), dextrose, dextrose, potassium chloride, magnesium sulfate, sodium phosphate IVPB **OR** sodium phosphate IVPB **OR** sodium phosphate IVPB, dextrose 5% and 0.45% NaCl with KCl 20 mEq  IV Drips/Infusions   dextrose      insulin 0.1 Units/kg/hr (10/08/20 1335)    dextrose 5% and 0.45% NaCl with KCl 20 mEq      sodium chloride       Home Medications  Medications Prior to Admission: naproxen (NAPROSYN) 250 MG tablet, Take 1 tablet by mouth 2 times daily (with meals)  metoclopramide (REGLAN) 10 MG tablet, Take 1 tablet by mouth 4 times daily  blood glucose test strips (CONTOUR NEXT TEST) strip, Neelima Contour test strips. Checks 4 times/day before meals and at bedtime and as needed for symptoms of irregular blood glucose  pantoprazole (PROTONIX) 40 MG tablet, Take 1 tablet by mouth 2 times daily (before meals)  insulin aspart (NOVOLOG) 100 UNIT/ML injection vial, Inject into the skin 3 times daily (before meals) PER PUMP  blood glucose test strips (CONTOUR NEXT TEST) strip, Neelima Contour test strips. Checks 4 times/day before meals and at bedtime and as needed for symptoms of irregular blood glucose  doxepin (ZONALON) 5 % cream, APPLY ONE GRAM (ONE PUMP) EXTERNALLY THREE TIMES A DAY TO PAIN AREA TO LOWER BACK   Gel Base (VERSAPRO) GEL, APPLY ONE GRAM (ONE PUMP) EXTERNALLY FOUR TIMES A DAY TO LOWER BACK   Insulin Pump - Insulin regular, Inject 1 Units/hr into the skin continuous Insulin-to-Carb Ratio (ICR):  Insulin Sensitivity Factor (ISF): mg/dL per unit of insulin Target Blood Glucose: mg/dL Bolus Frequency:    Pt's dosing ranges, pt follows with Dr. Debra Barry   Novolog pump  acetone, urine, test strip, Use daily as directed if bs >250 x2 or illness    Diet/Nutrition   Diet NPO Effective Now    Allergies   Patient has no known allergies. Social History   Tobacco   reports that she has never smoked. She has never used smokeless tobacco.    Alcohol     reports no history of alcohol use. Occupational history :    Family History         Problem Relation Age of Onset    Diabetes Maternal Grandmother     Diabetes Paternal Grandmother     Stroke Other     Thyroid Disease Neg Hx        ROS     REVIEW OF SYSTEMS:  Review of Systems   Constitutional: Positive for fatigue. Negative for fever. HENT: Negative for congestion. Eyes: Negative for pain. Respiratory: Negative for cough and shortness of breath. Cardiovascular: Negative for chest pain and palpitations. Gastrointestinal: Positive for abdominal pain, nausea (Now resolved) and vomiting (Now resolved). Genitourinary: Negative for difficulty urinating and dysuria. Musculoskeletal: Positive for back pain. Skin: Negative for wound. Neurological: Negative for dizziness and headaches. Psychiatric/Behavioral: Negative for agitation and confusion. Mechanical Ventilation Data   VENT SETTINGS (Comprehensive)  Vent Information  SpO2: 96 %  Additional Respiratory  Assessments  Pulse: 121  Resp: 16  SpO2: 96 %    ABG  Lab Results   Component Value Date    PH 7.395 2020    PCO2 37.7 2020    PO2 50.7 2020    HCO3 22.6 2020    O2SAT 82.6 2020     Lab Results   Component Value Date    MODE RA 2020           Vitals    height is 5' 1\" (1.549 m) and weight is 87 lb (39.5 kg). Her oral temperature is 98.9 °F (37.2 °C). Her blood pressure is 101/56 (abnormal) and her pulse is 121. Her respiration is 16 and oxygen saturation is 96%.        Temperature Range: Temp: 98.9 °F (37.2 °C) Temp  Av.9 °F (37.2 °C)  Min: 98.9 °F (37.2 °C)  Max: 98.9 °F (37.2 °C)  BP Range:  Systolic (58YDA), DCY:333 , Min:101 , ZFI:133     Diastolic (46NUQ), VAF:04, Min:56, Max:58    Pulse Range: Pulse  Avg: 109.7  Min: 86  Max: 122  Respiration Range: Resp  Avg: 15.3  Min: 14  Max: 16  Current Pulse Ox[de-identified]  SpO2: 96 %  24HR Pulse Ox Range:  SpO2  Av.7 %  Min: 95 %  Max: 99 %  Oxygen Amount and Delivery:        I/O (24 Hours)    Patient Vitals for the past 8 hrs:   BP Temp Temp src Pulse Resp SpO2 Height Weight   10/08/20 1339 (!) 101/56 98.9 °F (37.2 °C) Oral 121 16 96 % -- --   10/08/20 1216 (!) 103/58 98.9 °F (37.2 °C) Oral 122 16 95 % 5' 1\" (1.549 m) 87 lb (39.5 kg)     No intake or output data in the 24 hours ending 10/08/20 1619  No intake/output data recorded. Patient Vitals for the past 96 hrs (Last 3 readings):   Weight   10/08/20 1216 87 lb (39.5 kg)     Exam         PHYSICAL EXAM:    General appearance -patient is alert and awake appears fatigued   mental status - alert, oriented to person, place, and time, normal mood, behavior, speech, dress, motor activity, and thought processes  Eyes - pupils equal and reactive, extraocular eye movements intact, sclera anicteric  Ears - external ear normal  Nose - normal and patent, no erythema, discharge or polyps  Mouth - mucous membranes moist, pharynx is clear. Neck - supple, no significant adenopathy  Chest - clear to auscultation, no wheezes, rales or rhonchi, symmetric air entry  Heart - normal rate, regular rhythm, normal S1, S2, no murmurs, rubs, clicks or gallops  Abdomen -patient has generalized abdominal tenderness to palpation. Neurological - alert, oriented, normal speech, no focal findings or movement disorder noted  Musculoskeletal-patient with bilateral paraspinal lumbar tenderness to palpation. No wounds or rashes over the back. There is no midline tenderness, no step-offs no deformity. Extremities - peripheral pulses normal, Patient has bilateral calf tenderness to palpation. No calf swelling or erythema noted.   Skin - normal coloration and turgor, no rashes, no suspicious skin lesions noted     Data   Old records and images have been reviewed    Lab Results   CBC     Lab Results   Component Value Date    WBC 19.9 10/08/2020    RBC 4.31 10/08/2020    HGB 12.0 10/08/2020    HCT 37.9 10/08/2020     10/08/2020    MCV 87.9 10/08/2020    MCH 27.8 10/08/2020    MCHC 31.7 10/08/2020    RDW 13.1 10/08/2020    NRBC 0.0 05/10/2019    SEGSPCT 58 09/29/2013    METASPCT 1.0 04/29/2020    LYMPHOPCT 7.8 10/08/2020    MONOPCT 4.5 10/08/2020    MYELOPCT 1.0 04/29/2020    BASOPCT 0.4 10/08/2020    MONOSABS 0.89 10/08/2020    LYMPHSABS 1.55 10/08/2020    EOSABS 0.00 10/08/2020    BASOSABS 0.07 10/08/2020       BMP   Lab Results   Component Value Date     10/08/2020    K 5.1 10/08/2020    K 4.3 10/07/2020    CL 95 10/08/2020    CO2 7 10/08/2020    BUN 27 10/08/2020    CREATININE 0.8 10/08/2020    GLUCOSE 662 10/08/2020    CALCIUM 10.5 10/08/2020       LFTS  Lab Results   Component Value Date    ALKPHOS 93 10/08/2020    ALT 21 10/08/2020    AST 21 10/08/2020    PROT 7.7 10/08/2020    BILITOT 0.5 10/08/2020    BILIDIR <0.2 09/04/2020    IBILI see below 09/04/2020    LABALBU 4.3 10/08/2020       INR  No results for input(s): PROTIME, INR in the last 72 hours. APTT  No results for input(s): APTT in the last 72 hours. Lactic Acid  Lab Results   Component Value Date    LACTA 2.5 10/08/2020    LACTA 4.7 10/08/2020    LACTA 1.7 10/07/2020        BNP   No results for input(s): BNP in the last 72 hours. Cultures     No results for input(s): BC in the last 72 hours. No results for input(s): Aundria Ventura in the last 72 hours. No results for input(s): LABURIN in the last 72 hours. Radiology   Xr Chest Portable    Result Date: 10/8/2020  EXAMINATION: ONE XRAY VIEW OF THE CHEST 10/8/2020 12:53 pm COMPARISON: 08/31/2020 HISTORY: ORDERING SYSTEM PROVIDED HISTORY: eval for pneumonia TECHNOLOGIST PROVIDED HISTORY: Reason for exam:->eval for pneumonia FINDINGS: The mediastinal and cardiac contours are normal.  The lungs are clear.   There is no focal consolidation, pleural effusion or pneumothorax evident. The bones are unremarkable. No acute cardiopulmonary process identified. SYSTEMS ASSESSMENT    Neuro   Patient is awake alert and oriented    Respiratory   COVID positive  -Without symptoms  -No increased oxygen demand, on room air    Cardiovascular   Tachycardia  -Bilateral calf tenderness to palpation  -D-dimer ordered    Gastrointestinal   Generalized abdominal pain  -Likely related to DKA, will reassess  -No nausea or vomiting at this time    Renal   Hyperkalemia  -Patient receiving insulin  -Follow-up BMP     Infectious Disease   COVID positive  -Leukocytosis 19.9, afebrile  -UA is negative  -Chest x-ray is negative  -Repeat CBC in the morning  -Ordered CRP and ferritin    Lactic acidosis   -Elevated 4.7  -Patient receiving fluids, we will trend    Hematology/Oncology   Hemoglobin is 12.0  -Trend CBC    Endocrine   DKA  -Glucose-662, beta hydroxybutyrate greater than 4.5, anion gap 33, venous pH 7.07.  -Patient is on insulin drip and n.p.o.  -Monitor labs as per protocol    Musculoskeletal  Back pain  Previous negative MRI  Given lidocaine patch, Toradol, Norflex    Social/Spiritual/DNR/Other   Full code      Portia Nails, DO, PGY-1    I personally saw, examined and provided care for the patient. Radiographs, labs and medication list were reviewed by me independently. I spoke with bedside nursing, therapists and consultants. Critical care services and times documented are independent of procedures and multidisciplinary rounds with Residents. Additionally comprehensive, multidisciplinary rounds were conducted with the MICU team. The case was discussed in detail and plans for care were established. Review of Residents documentation was conducted and revisions were made as appropriate. I agree with the above documented exam, problem list and plan of care. Perico Santamaria M.D.    Pulmonary/Critical Care Medicine   40 min cct excluding procedures

## 2020-10-09 VITALS
RESPIRATION RATE: 12 BRPM | HEIGHT: 61 IN | BODY MASS INDEX: 16.82 KG/M2 | DIASTOLIC BLOOD PRESSURE: 87 MMHG | SYSTOLIC BLOOD PRESSURE: 124 MMHG | WEIGHT: 89.07 LBS | OXYGEN SATURATION: 100 % | HEART RATE: 107 BPM | TEMPERATURE: 98 F

## 2020-10-09 LAB
ALBUMIN SERPL-MCNC: 3.4 G/DL (ref 3.5–5.2)
ALP BLD-CCNC: 67 U/L (ref 35–104)
ALT SERPL-CCNC: 15 U/L (ref 0–32)
ANION GAP SERPL CALCULATED.3IONS-SCNC: 10 MMOL/L (ref 7–16)
ANION GAP SERPL CALCULATED.3IONS-SCNC: 13 MMOL/L (ref 7–16)
AST SERPL-CCNC: 19 U/L (ref 0–31)
BASOPHILS ABSOLUTE: 0.04 E9/L (ref 0–0.2)
BASOPHILS RELATIVE PERCENT: 0.3 % (ref 0–2)
BILIRUB SERPL-MCNC: 0.4 MG/DL (ref 0–1.2)
BUN BLDV-MCNC: 16 MG/DL (ref 6–20)
BUN BLDV-MCNC: 17 MG/DL (ref 6–20)
C-REACTIVE PROTEIN: 0.2 MG/DL (ref 0–0.4)
CALCIUM SERPL-MCNC: 9 MG/DL (ref 8.6–10.2)
CALCIUM SERPL-MCNC: 9.1 MG/DL (ref 8.6–10.2)
CHLORIDE BLD-SCNC: 109 MMOL/L (ref 98–107)
CHLORIDE BLD-SCNC: 110 MMOL/L (ref 98–107)
CO2: 16 MMOL/L (ref 22–29)
CO2: 19 MMOL/L (ref 22–29)
CREAT SERPL-MCNC: 0.6 MG/DL (ref 0.5–1)
CREAT SERPL-MCNC: 0.6 MG/DL (ref 0.5–1)
EOSINOPHILS ABSOLUTE: 0.01 E9/L (ref 0.05–0.5)
EOSINOPHILS RELATIVE PERCENT: 0.1 % (ref 0–6)
GFR AFRICAN AMERICAN: >60
GFR AFRICAN AMERICAN: >60
GFR NON-AFRICAN AMERICAN: >60 ML/MIN/1.73
GFR NON-AFRICAN AMERICAN: >60 ML/MIN/1.73
GLUCOSE BLD-MCNC: 144 MG/DL (ref 74–99)
GLUCOSE BLD-MCNC: 209 MG/DL (ref 74–99)
HCT VFR BLD CALC: 27.8 % (ref 34–48)
HEMOGLOBIN: 9.3 G/DL (ref 11.5–15.5)
IMMATURE GRANULOCYTES #: 0.06 E9/L
IMMATURE GRANULOCYTES %: 0.4 % (ref 0–5)
LACTIC ACID: 1.5 MMOL/L (ref 0.5–2.2)
LYMPHOCYTES ABSOLUTE: 3.53 E9/L (ref 1.5–4)
LYMPHOCYTES RELATIVE PERCENT: 23.7 % (ref 20–42)
MAGNESIUM: 1.6 MG/DL (ref 1.6–2.6)
MAGNESIUM: 2.4 MG/DL (ref 1.6–2.6)
MCH RBC QN AUTO: 27.8 PG (ref 26–35)
MCHC RBC AUTO-ENTMCNC: 33.5 % (ref 32–34.5)
MCV RBC AUTO: 83.2 FL (ref 80–99.9)
METER GLUCOSE: 123 MG/DL (ref 74–99)
METER GLUCOSE: 128 MG/DL (ref 74–99)
METER GLUCOSE: 137 MG/DL (ref 74–99)
METER GLUCOSE: 149 MG/DL (ref 74–99)
METER GLUCOSE: 159 MG/DL (ref 74–99)
METER GLUCOSE: 163 MG/DL (ref 74–99)
METER GLUCOSE: 200 MG/DL (ref 74–99)
METER GLUCOSE: 208 MG/DL (ref 74–99)
METER GLUCOSE: 60 MG/DL (ref 74–99)
METER GLUCOSE: 80 MG/DL (ref 74–99)
MONOCYTES ABSOLUTE: 1.28 E9/L (ref 0.1–0.95)
MONOCYTES RELATIVE PERCENT: 8.6 % (ref 2–12)
NEUTROPHILS ABSOLUTE: 9.97 E9/L (ref 1.8–7.3)
NEUTROPHILS RELATIVE PERCENT: 66.9 % (ref 43–80)
PDW BLD-RTO: 13.2 FL (ref 11.5–15)
PHOSPHORUS: 2.6 MG/DL (ref 2.5–4.5)
PHOSPHORUS: 2.7 MG/DL (ref 2.5–4.5)
PLATELET # BLD: 304 E9/L (ref 130–450)
PMV BLD AUTO: 10.8 FL (ref 7–12)
POTASSIUM SERPL-SCNC: 4.1 MMOL/L (ref 3.5–5)
POTASSIUM SERPL-SCNC: 4.1 MMOL/L (ref 3.5–5)
RBC # BLD: 3.34 E12/L (ref 3.5–5.5)
SODIUM BLD-SCNC: 138 MMOL/L (ref 132–146)
SODIUM BLD-SCNC: 139 MMOL/L (ref 132–146)
TOTAL CK: 277 U/L (ref 20–180)
TOTAL PROTEIN: 5.9 G/DL (ref 6.4–8.3)
WBC # BLD: 14.9 E9/L (ref 4.5–11.5)

## 2020-10-09 PROCEDURE — 80048 BASIC METABOLIC PNL TOTAL CA: CPT

## 2020-10-09 PROCEDURE — 6370000000 HC RX 637 (ALT 250 FOR IP): Performed by: STUDENT IN AN ORGANIZED HEALTH CARE EDUCATION/TRAINING PROGRAM

## 2020-10-09 PROCEDURE — 83735 ASSAY OF MAGNESIUM: CPT

## 2020-10-09 PROCEDURE — 83605 ASSAY OF LACTIC ACID: CPT

## 2020-10-09 PROCEDURE — 85025 COMPLETE CBC W/AUTO DIFF WBC: CPT

## 2020-10-09 PROCEDURE — 2500000003 HC RX 250 WO HCPCS: Performed by: INTERNAL MEDICINE

## 2020-10-09 PROCEDURE — 99254 IP/OBS CNSLTJ NEW/EST MOD 60: CPT | Performed by: INTERNAL MEDICINE

## 2020-10-09 PROCEDURE — 6370000000 HC RX 637 (ALT 250 FOR IP): Performed by: INTERNAL MEDICINE

## 2020-10-09 PROCEDURE — 99239 HOSP IP/OBS DSCHRG MGMT >30: CPT | Performed by: INTERNAL MEDICINE

## 2020-10-09 PROCEDURE — 82550 ASSAY OF CK (CPK): CPT

## 2020-10-09 PROCEDURE — 82962 GLUCOSE BLOOD TEST: CPT

## 2020-10-09 PROCEDURE — 6360000002 HC RX W HCPCS: Performed by: INTERNAL MEDICINE

## 2020-10-09 PROCEDURE — 84100 ASSAY OF PHOSPHORUS: CPT

## 2020-10-09 PROCEDURE — 36415 COLL VENOUS BLD VENIPUNCTURE: CPT

## 2020-10-09 PROCEDURE — 80053 COMPREHEN METABOLIC PANEL: CPT

## 2020-10-09 PROCEDURE — 2580000003 HC RX 258: Performed by: INTERNAL MEDICINE

## 2020-10-09 RX ORDER — PANTOPRAZOLE SODIUM 40 MG/1
40 TABLET, DELAYED RELEASE ORAL
Status: DISCONTINUED | OUTPATIENT
Start: 2020-10-09 | End: 2020-10-09 | Stop reason: HOSPADM

## 2020-10-09 RX ORDER — DEXTROSE MONOHYDRATE 50 MG/ML
100 INJECTION, SOLUTION INTRAVENOUS PRN
Status: DISCONTINUED | OUTPATIENT
Start: 2020-10-09 | End: 2020-10-09 | Stop reason: HOSPADM

## 2020-10-09 RX ORDER — POLYETHYLENE GLYCOL 3350 17 G/17G
17 POWDER, FOR SOLUTION ORAL DAILY PRN
Status: DISCONTINUED | OUTPATIENT
Start: 2020-10-09 | End: 2020-10-09 | Stop reason: HOSPADM

## 2020-10-09 RX ORDER — NICOTINE POLACRILEX 4 MG
15 LOZENGE BUCCAL PRN
Status: DISCONTINUED | OUTPATIENT
Start: 2020-10-09 | End: 2020-10-09 | Stop reason: HOSPADM

## 2020-10-09 RX ORDER — SODIUM CHLORIDE 0.9 % (FLUSH) 0.9 %
10 SYRINGE (ML) INJECTION EVERY 12 HOURS SCHEDULED
Status: DISCONTINUED | OUTPATIENT
Start: 2020-10-09 | End: 2020-10-09 | Stop reason: HOSPADM

## 2020-10-09 RX ORDER — INSULIN GLARGINE 100 [IU]/ML
15 INJECTION, SOLUTION SUBCUTANEOUS EVERY MORNING
Status: DISCONTINUED | OUTPATIENT
Start: 2020-10-09 | End: 2020-10-09 | Stop reason: HOSPADM

## 2020-10-09 RX ORDER — PROMETHAZINE HYDROCHLORIDE 25 MG/1
12.5 TABLET ORAL EVERY 6 HOURS PRN
Status: DISCONTINUED | OUTPATIENT
Start: 2020-10-09 | End: 2020-10-09 | Stop reason: HOSPADM

## 2020-10-09 RX ORDER — INSULIN GLARGINE 100 [IU]/ML
15 INJECTION, SOLUTION SUBCUTANEOUS NIGHTLY
Status: DISCONTINUED | OUTPATIENT
Start: 2020-10-09 | End: 2020-10-09

## 2020-10-09 RX ORDER — DEXTROSE MONOHYDRATE 25 G/50ML
12.5 INJECTION, SOLUTION INTRAVENOUS PRN
Status: DISCONTINUED | OUTPATIENT
Start: 2020-10-09 | End: 2020-10-09

## 2020-10-09 RX ORDER — ACETAMINOPHEN 325 MG/1
650 TABLET ORAL EVERY 6 HOURS PRN
Status: DISCONTINUED | OUTPATIENT
Start: 2020-10-09 | End: 2020-10-09 | Stop reason: HOSPADM

## 2020-10-09 RX ORDER — ACETAMINOPHEN 650 MG/1
650 SUPPOSITORY RECTAL EVERY 6 HOURS PRN
Status: DISCONTINUED | OUTPATIENT
Start: 2020-10-09 | End: 2020-10-09 | Stop reason: HOSPADM

## 2020-10-09 RX ORDER — ONDANSETRON 2 MG/ML
4 INJECTION INTRAMUSCULAR; INTRAVENOUS EVERY 6 HOURS PRN
Status: DISCONTINUED | OUTPATIENT
Start: 2020-10-09 | End: 2020-10-09 | Stop reason: HOSPADM

## 2020-10-09 RX ORDER — SODIUM CHLORIDE 0.9 % (FLUSH) 0.9 %
10 SYRINGE (ML) INJECTION PRN
Status: DISCONTINUED | OUTPATIENT
Start: 2020-10-09 | End: 2020-10-09 | Stop reason: HOSPADM

## 2020-10-09 RX ADMIN — POTASSIUM CHLORIDE 10 MEQ: 7.46 INJECTION, SOLUTION INTRAVENOUS at 05:13

## 2020-10-09 RX ADMIN — SODIUM CHLORIDE, PRESERVATIVE FREE 10 ML: 5 INJECTION INTRAVENOUS at 08:10

## 2020-10-09 RX ADMIN — MORPHINE SULFATE 2 MG: 2 INJECTION, SOLUTION INTRAMUSCULAR; INTRAVENOUS at 01:59

## 2020-10-09 RX ADMIN — MAGNESIUM SULFATE HEPTAHYDRATE 1 G: 1 INJECTION, SOLUTION INTRAVENOUS at 02:01

## 2020-10-09 RX ADMIN — PHENOL 1 SPRAY: 1.5 LIQUID ORAL at 12:23

## 2020-10-09 RX ADMIN — POTASSIUM CHLORIDE 10 MEQ: 7.46 INJECTION, SOLUTION INTRAVENOUS at 02:58

## 2020-10-09 RX ADMIN — POTASSIUM CHLORIDE 10 MEQ: 7.46 INJECTION, SOLUTION INTRAVENOUS at 06:57

## 2020-10-09 RX ADMIN — MAGNESIUM SULFATE HEPTAHYDRATE 1 G: 1 INJECTION, SOLUTION INTRAVENOUS at 01:10

## 2020-10-09 RX ADMIN — INSULIN GLARGINE 15 UNITS: 100 INJECTION, SOLUTION SUBCUTANEOUS at 09:03

## 2020-10-09 RX ADMIN — MORPHINE SULFATE 2 MG: 2 INJECTION, SOLUTION INTRAMUSCULAR; INTRAVENOUS at 10:32

## 2020-10-09 RX ADMIN — INSULIN LISPRO 1 UNITS: 100 INJECTION, SOLUTION INTRAVENOUS; SUBCUTANEOUS at 08:15

## 2020-10-09 RX ADMIN — PHENOL 1 SPRAY: 1.5 LIQUID ORAL at 17:06

## 2020-10-09 RX ADMIN — SODIUM PHOSPHATE, MONOBASIC, MONOHYDRATE 10 MMOL: 276; 142 INJECTION, SOLUTION INTRAVENOUS at 06:19

## 2020-10-09 RX ADMIN — MORPHINE SULFATE 2 MG: 2 INJECTION, SOLUTION INTRAMUSCULAR; INTRAVENOUS at 06:16

## 2020-10-09 RX ADMIN — SODIUM PHOSPHATE, MONOBASIC, MONOHYDRATE 10 MMOL: 276; 142 INJECTION, SOLUTION INTRAVENOUS at 01:10

## 2020-10-09 RX ADMIN — POTASSIUM CHLORIDE 10 MEQ: 7.46 INJECTION, SOLUTION INTRAVENOUS at 01:10

## 2020-10-09 ASSESSMENT — PAIN DESCRIPTION - LOCATION
LOCATION: ABDOMEN;BACK
LOCATION: ABDOMEN;BACK
LOCATION: BACK;ABDOMEN
LOCATION: BACK;ABDOMEN
LOCATION: ABDOMEN;BACK

## 2020-10-09 ASSESSMENT — PAIN SCALES - GENERAL
PAINLEVEL_OUTOF10: 4
PAINLEVEL_OUTOF10: 8
PAINLEVEL_OUTOF10: 5
PAINLEVEL_OUTOF10: 0
PAINLEVEL_OUTOF10: 8
PAINLEVEL_OUTOF10: 6
PAINLEVEL_OUTOF10: 8
PAINLEVEL_OUTOF10: 4
PAINLEVEL_OUTOF10: 3
PAINLEVEL_OUTOF10: 0
PAINLEVEL_OUTOF10: 8

## 2020-10-09 ASSESSMENT — PAIN DESCRIPTION - PAIN TYPE
TYPE: CHRONIC PAIN

## 2020-10-09 ASSESSMENT — PAIN DESCRIPTION - DESCRIPTORS
DESCRIPTORS: ACHING
DESCRIPTORS: ACHING
DESCRIPTORS: CONSTANT
DESCRIPTORS: ACHING;CRAMPING;SHARP

## 2020-10-09 ASSESSMENT — PAIN DESCRIPTION - PROGRESSION: CLINICAL_PROGRESSION: NOT CHANGED

## 2020-10-09 ASSESSMENT — PAIN DESCRIPTION - ORIENTATION: ORIENTATION: LOWER;MID

## 2020-10-09 ASSESSMENT — PAIN DESCRIPTION - FREQUENCY
FREQUENCY: CONTINUOUS

## 2020-10-09 ASSESSMENT — PAIN DESCRIPTION - ONSET: ONSET: GRADUAL

## 2020-10-09 NOTE — DISCHARGE INSTR - COC
Continuity of Care Form    Patient Name: Padilla Velázquez   :  1995  MRN:  [id number]    Admit date:  10/8/2020  Discharge date:  10-9-2020    Code Status Order: Full Code   Advance Directives:   Advance Care Flowsheet Documentation     Date/Time Healthcare Directive Type of Healthcare Directive Copy in [address] Agent's Name Healthcare Agent's Phone Number    10/08/20 1950  No, patient does not have an advance directive for healthcare treatment -- -- -- -- --    10/08/20 1856  No, patient does not have an advance directive for healthcare treatment -- -- -- -- --          Admitting Physician:  Leigh Ann Garcia MD  PCP: Polina Singh DO    Discharging Nurse: [facility] Unit/Room#: 0215/0215-A  Discharging Unit Phone Number: ***    Emergency Contact:   Extended Emergency Contact Information  Primary Emergency Contact: Edwina Mckay  Address: [address] Phone: [phone]  Mobile Phone: [phone]  Relation: Parent   needed? No  Secondary Emergency Contact: Erich Mckay  Address: [address] Phone: [phone]  Mobile Phone: [phone]  Relation: Parent   needed?  No    Past Surgical History:  Past Surgical History:   Procedure Laterality Date    ABDOMEN SURGERY N/A 2019    INCISION AND DRAINAGE OF SUPRAPUBIC ABSESS performed by Meli Funez MD at [address] [place] [address]      last yr       Immunization History:   Immunization History   Administered Date(s) Administered    DTaP vaccine 10/25/2004    HPV Quadrivalent (Gardasil) 2009, 2011, 10/05/2012    Hepatitis A Ped/Adol (Havrix, Vaqta) 2009, 2011    Influenza Live, intranasal, LAIV3 10/05/2012    Influenza Virus Vaccine 2007    Influenza, Quadv, IM, PF (6 mo and older Fluzone, Flulaval, Fluarix, and 3 yrs and older Afluria) 10/28/2013, 11/04/2015    MMR 10/25/2004    Meningococcal MCV4P (Menactra) 01/06/2009    Polio IPV (IPOL) 10/25/2004    Tdap (Boostrix, Adacel) 01/06/2009    Varicella (Varivax) 10/05/2012       Active Problems:  Patient Active Problem List   Diagnosis Code    DKA, type 1 ([facility] [address] E10.10    Diabetes mellitus type 1, uncontrolled ([facility] [address] E10.65    Personal history of noncompliance with medical treatment, presenting hazards to health Z91.19    Leukocytosis D72.829    Elevated lactic acid level R79.89    Hyperglycemia R73.9    Pyelonephritis N12    Acidosis E87.2    Severe dehydration E86.0    Hypokalemia E87.6    Diabetic ketoacidosis without coma associated with diabetes mellitus due to underlying condition ([facility] [address] E08.10    Hypoglycemia E16.2    Vitamin D deficiency E55.9    Uncontrolled type 1 diabetes mellitus with hyperglycemia (HCC) E10.65    Severe protein-calorie malnutrition (HCC) E43    Trichomoniasis A59.9    Lactic acid acidosis E87.2    DKA, type 1, not at goal [facility]) E10.10       Isolation/Infection:   Isolation          Droplet Plus        Patient Infection Status     Infection Onset Added Last Indicated Last Indicated By Review Planned Expiration Resolved Resolved By    COVID-19 10/08/20 10/08/20 10/08/20 COVID-19 10/15/20 10/22/20      Resolved    COVID-19 Rule Out 10/08/20 10/08/20 10/08/20 COVID-19 (Ordered)   10/08/20 Rule-Out Test Resulted    MRSA 08/31/20 09/01/20 08/31/20 Culture, MRSA, Screening   10/09/20 Lucie Muñoz RN    COVID-19 Rule Out 04/29/20 04/29/20 04/29/20 COVID-19 (Ordered)   04/29/20 Rule-Out Test Resulted    NOT DETECTED 4/30/2020          Nurse Assessment:  Last Vital Signs: /87   Pulse 107   Temp 98 °F (36.7 °C) (Oral)   Resp 12   Ht 5' 1\" (1.549 m)   Wt 89 lb 1.1 oz (40.4 kg)   SpO2 100%   BMI 16.83 kg/m²     Last documented pain score (0-10 scale): Pain Level: 0  Last Weight:    Wt Readings from Last 1 Encounters:   10/09/20 89 lb 1.1 oz (40.4 kg)     Mental Status:  oriented and alert    IV Access:  - None    Nursing Mobility/ADLs:  Walking   Independent  Transfer  Independent  Bathing  Independent  Dressing  Independent  Toileting  Independent  Feeding  Independent  Med [address] Delivery   none    Wound Care Documentation and Therapy:        Elimination:  Continence:   · Bowel: Yes  · Bladder: Yes  Urinary Catheter: None   Colostomy/Ileostomy/Ileal Conduit: No       Date of Last BM:     Intake/Output Summary (Last 24 hours) at 10/9/2020 1610  Last data filed at 10/9/2020 1020  Gross per 24 hour   Intake 5048. 35 ml   Output 875 ml   Net 4173.35 ml     I/O last 3 completed shifts:   In: 5048.4 [P.O.:20; I.V.:4678.4; IV Piggyback:350]  Out: 308 [Urine:875]    Safety Concerns:     None    Impairments/Disabilities:      None    Nutrition Therapy:  Current Nutrition Therapy:   - Oral Diet:  Carb Control 4 carbs/meal (1800kcals/day)    Routes of Feeding: Oral  Liquids: No Restrictions  Daily Fluid Restriction: no  Last Modified Barium Swallow with Video (Video Swallowing Test): not done    Treatments at the Time of Hospital Discharge:   Respiratory Treatments:   Oxygen Therapy:  {Therapy; copd oxygen:25539}  Ventilator:    { CC Vent SQVF:347659739}    Rehab Therapies: {THERAPEUTIC INTERVENTION:7612449759}  Weight Bearing Status/Restrictions: [facility] Weight Bearin}  Other Medical Equipment (for information only, NOT a DME order):  {EQUIPMENT:443930705}  Other Treatments: ***    Patient's personal belongings (please select all that are sent with patient):  {[facility] DME Belongings:199012726}    RN SIGNATURE:  Electronically signed by Maia Cadena RN on 10/9/20 at [address] PM EDT    CASE MANAGEMENT/SOCIAL WORK SECTION    Inpatient Status Date: ***    Readmission Risk Assessment Score:  Readmission Risk              Risk of Unplanned Readmission:        33           Discharging to Facility/ Agency   · Name:   · Address:  · Phone:  · Fax:    Dialysis Facility (if applicable)   · Name:  · Address:  · Dialysis Schedule:  · Phone:  · Fax:    / signature: {Esignature:262776256}    PHYSICIAN SECTION    Prognosis: {Prognosis:5010896075}    Condition at Discharge: Taj Rollins Patient Condition:459677724}    Rehab Potential (if transferring to Rehab): {Prognosis:2460876543}    Recommended Labs or Other Treatments After Discharge: ***    Physician Certification: I certify the above information and transfer of Abdulaziz John  is necessary for the continuing treatment of the diagnosis listed and that she requires {Admit to Appropriate Level of Care:81183} for {GREATER/LESS:827168068} 30 days.      Update Admission H&P: {CHP DME Changes in TZK:275036903}    PHYSICIAN SIGNATURE:  {Esignature:792473414}

## 2020-10-09 NOTE — FLOWSHEET NOTE
Patient transported via wheelchair to ER entrance for ride. N95 mask was worn by patient. Patient had her purse, phone, slippers, and basket ball shorts in her possession.

## 2020-10-09 NOTE — PLAN OF CARE
pain  10/9/2020 0933 by Steffi Wan RN  Outcome: Ongoing  10/9/2020 0124 by Kemar Issa RN  Outcome: Not Met This Shift  10/8/2020 1953 by Yana Lopez RN  Outcome: Met This Shift  Goal: Control of chronic pain  Description: Control of chronic pain  10/9/2020 0933 by Steffi Wan RN  Outcome: Ongoing  10/9/2020 0124 by Kemar Issa RN  Outcome: Not Met This Shift  10/8/2020 1953 by Yana Lopez RN  Outcome: Met This Shift     Problem: Loneliness or Risk for Loneliness  Goal: Demonstrate positive use of time alone when socialization is not possible  10/9/2020 0124 by Kemar Issa RN  Outcome: Not Met This Shift  10/8/2020 1953 by Yana Lopez RN  Outcome: Met This Shift     Problem: Fatigue  Goal: Verbalize increase energy and improved vitality  10/9/2020 0124 by Kemar Issa RN  Outcome: Not Met This Shift  10/8/2020 1953 by Yana Lopez RN  Outcome: Met This Shift     Problem: Patient Education: Go to Patient Education Activity  Goal: Patient/Family Education  10/9/2020 0124 by Kemar Issa RN  Outcome: Not Met This Shift  10/8/2020 1953 by Yana oLpez RN  Outcome: Met This Shift     Problem: Airway Clearance - Ineffective  Goal: Achieve or maintain patent airway  10/9/2020 0933 by Steffi Wan RN  Outcome: Completed  10/9/2020 0124 by Kemar Issa RN  Outcome: Met This Shift  10/8/2020 1953 by Yana Lopez RN  Outcome: Met This Shift     Problem: Gas Exchange - Impaired  Goal: Absence of hypoxia  10/9/2020 0933 by Steffi Wan RN  Outcome: Completed  10/9/2020 0124 by Kemar Issa RN  Outcome: Met This Shift  10/8/2020 1953 by Yana Lopez RN  Outcome: Met This Shift  Goal: Promote optimal lung function  10/9/2020 0933 by Steffi Wan RN  Outcome: Completed  10/9/2020 0124 by Kemar Issa RN  Outcome: Met This Shift  10/8/2020 1953 by Yana Lopez RN  Outcome: Met This Shift     Problem: Breathing Pattern - Ineffective  Goal: Ability to achieve and maintain a regular respiratory rate  10/9/2020 0933 by Jaimie Gross RN  Outcome: Completed  10/9/2020 0124 by Rhianna Perez RN  Outcome: Met This Shift  10/8/2020 1953 by Shannan Brush RN  Outcome: Met This Shift     Problem:  Body Temperature -  Risk of, Imbalanced  Goal: Ability to maintain a body temperature within defined limits  10/9/2020 0933 by Jaimie Gross RN  Outcome: Completed  10/9/2020 0124 by Rhianna Perez RN  Outcome: Met This Shift  10/8/2020 1953 by Shannan Brush RN  Outcome: Met This Shift  Goal: Will regain or maintain usual level of consciousness  10/9/2020 0933 by Jaimie Gross RN  Outcome: Completed  10/9/2020 0124 by Rhianna Perez RN  Outcome: Met This Shift  10/8/2020 1953 by Shannan Brush RN  Outcome: Met This Shift  Goal: Complications related to the disease process, condition or treatment will be avoided or minimized  10/9/2020 0933 by Jaimie Gross RN  Outcome: Completed  10/9/2020 0124 by Rhianna Perez RN  Outcome: Not Met This Shift  10/8/2020 1953 by Shannan Brush RN  Outcome: Met This Shift     Problem: Isolation Precautions - Risk of Spread of Infection  Goal: Prevent transmission of infection  10/9/2020 0933 by Jaimie Gross RN  Outcome: Completed  10/9/2020 0124 by Rhianna Perez RN  Outcome: Met This Shift  10/8/2020 1953 by Shannan Brush RN  Outcome: Met This Shift

## 2020-10-09 NOTE — HOME CARE
Patient is active with [facility] for skilled nursing. Will need new home care orders at discharge.  Cathy Newsome lpn

## 2020-10-09 NOTE — CONSULTS
Or  ondansetron, 4 mg, Q6H PRN  glucose, 15 g, PRN  dextrose, 12.5 g, PRN  glucagon (rDNA), 1 mg, PRN  dextrose, 100 mL/hr, PRN  sodium chloride flush, 10 mL, PRN  glucose, 15 g, PRN  dextrose, 12.5 g, PRN  glucagon (rDNA), 1 mg, PRN  dextrose, 100 mL/hr, PRN  dextrose, 12.5 g, PRN  potassium chloride, 10 mEq, PRN  magnesium sulfate, 1 g, PRN  sodium phosphate IVPB, 10 mmol, PRN    Or  sodium phosphate IVPB, 15 mmol, PRN    Or  sodium phosphate IVPB, 20 mmol, PRN  dextrose 5% and 0.45% NaCl with KCl 20 mEq, , Continuous PRN  morphine, 2 mg, Q4H PRN      Continuous Infusions:   dextrose      dextrose      dextrose 5% and 0.45% NaCl with KCl 20 mEq      sodium chloride Stopped (10/08/20 2240)       Review of Systems  All systems reviewed. All negative except for symptoms mentioned in HPI     OBJECTIVE    /60   Pulse 114   Temp 98 °F (36.7 °C) (Oral)   Resp 13   Ht 5' 1\" (1.549 m)   Wt 89 lb 1.1 oz (40.4 kg)   SpO2 100%   BMI 16.83 kg/m²     Intake/Output Summary (Last 24 hours) at 10/9/2020 0807  Last data filed at 10/9/2020 0514  Gross per 24 hour   Intake 3330 ml   Output 275 ml   Net 3055 ml       Physical examination:  Due to this being a TeleHealth encounter, evaluation of the following organ systems is limited:   EENT/Resp/CV/GI//MS/Neuro/Skin/Heme-Lymph-Imm.     General: awake alert, oriented x3, still feels sick   Pulm: move with respiration   Skin: no rash  Psych: normal mood, and affect    Review of Laboratory Data:  I personally reviewed the following labs:   Recent Labs     10/07/20  1545 10/08/20  1206 10/09/20  0345   WBC 7.2 19.9* 14.9*   RBC 4.48 4.31 3.34*   HGB 12.5 12.0 9.3*   HCT 37.7 37.9 27.8*   MCV 84.2 87.9 83.2   MCH 27.9 27.8 27.8   MCHC 33.2 31.7* 33.5   RDW 12.7 13.1 13.2    339 304   MPV 11.6 11.5 10.8     Recent Labs     10/07/20  1545  10/08/20  1206  10/08/20  2010 10/09/20  0005 10/09/20  0345   *  --  135   < > 134 139 138   K 4.3  --  5.1*   < > 4.9 4.1 4.1   CL 94*  --  95*   < > 107 110* 109*   CO2 22  --  7*   < > 8* 16* 19*   BUN 25*  --  27*   < > 19 17 16   CREATININE 0.6  --  0.8   < > 0.6 0.6 0.6   GLUCOSE 320*   < > 662*   < > 309* 209* 144*   CALCIUM 10.2  --  10.5*   < > 9.2 9.1 9.0   PROT 7.6  --  7.7  --   --   --  5.9*   LABALBU 4.1  --  4.3  --   --   --  3.4*   BILITOT 0.6  --  0.5  --   --   --  0.4   ALKPHOS 83  --  93  --   --   --  67   AST 28  --  21  --   --   --  19   ALT 23  --  21  --   --   --  15    < > = values in this interval not displayed. Beta-Hydroxybutyrate   Date Value Ref Range Status   10/08/2020 >4.50 (H) 0.02 - 0.27 mmol/L Final   10/07/2020 2.95 (H) 0.02 - 0.27 mmol/L Final   08/31/2020 >4.50 (H) 0.02 - 0.27 mmol/L Final     Lab Results   Component Value Date    LABA1C 13.0 05/30/2020    LABA1C 15.9 03/09/2020    LABA1C 14.2 08/06/2019     No results found for: TSH, T4FREE, E6MAAOK, FT3, A9HXUPP, TSI, TPOABS, THGAB  Lab Results   Component Value Date    LABA1C 13.0 05/30/2020    GLUCOSE 144 10/09/2020    MALBCR 204.6 01/23/2018    LABMICR 102.3 01/23/2018    LABCREA 207 09/02/2020     Lab Results   Component Value Date    TRIG 106 01/24/2018    HDL 40 01/24/2018    LDLCALC 114 01/24/2018    CHOL 175 01/24/2018       Blood culture   Lab Results   Component Value Date    BC 5 Days no growth 08/31/2020    BC 5 Days no growth 07/13/2020       Radiology:  XR CHEST PORTABLE   Final Result   No acute cardiopulmonary process identified.              Medical Records/Labs/Images review:   I personally reviewed and summarized previous records   All labs and imaging were reviewed independently     [place], a 27 y.o.-old female seen today for inpatient diabetes management     DM1 admitted with DKA   · Patient's DM is poorly controlled  due to poor compliance with diet and BS checking   · Currently in insulin drip as per DKA protocol   · Once pt meets criteria for transition to SQ insulin we recommend

## 2020-10-09 NOTE — PLAN OF CARE
Intensive Care Daily Quality Rounding Checklist      ICU Team Members: Dr. China Soto, Keshia Bolanos (residents), charge nurse, bedside nurse, clinical pharmacist    Day #: 2    Intubation Date: n/a    Ventilator Day #: n/a    Central Line Insertion Date: n/a        Day #: n/a     Arterial Line Insertion Date: n/a      Day #: n/a    DVT Prophylaxis: refused    GI Prophylaxis:    Bey Catheter Insertion Date: n/a       Day #:      Continued need (if yes, reason documented and discussed with physician):    Skin Issues/ Wounds and ordered treatment discussed on rounds: no issues    Goals/ Plans for the Day: Daily labs, transition to SQ Insulin, OK to eat, transfer vs. discharge

## 2020-10-09 NOTE — PROGRESS NOTES
Reason for consult: Uncontrolled type 1 DM    A1C: 13% 5/30/20     [] Not available                 Patient states the following concerns/barriers to diabetes self-management:       [x] None       [] Medication cost   [] Food cost/availability          [] Reading  [] Hearing   [] Vision                  [] Work    [] Transportation  [] No insurance    [] Physical limitations    [] Other:            Information reviewed: Target glucose ranges/A1C   Self-monitoring of blood glucose   Prevention/symptoms/treatment of hypo-/hyperglycemia   Medication adherence (insulin pump)   Reducing the risk of chronic complications           Comment:  Consult conducted over phone d/t COVID-19 positive. Patient very well known to me. She has had many admissions for DKA this year. She obtained a [employer]tronic 670g insulin pump a few months ago, which she states has been working well for her, and has been keeping her glucose levels in the 120s until this past week when they started to suddenly become elevated. Pump settings are as follows: Basal rate 12a 0.8, CR 14, ISF 53. She states that she was in the ER yesterday for hyperglycemia, nausea, and abdominal pain, but discharged back home after receiving treatment. She was under the false impression that she was not supposed to restart her insulin pump because of the fluids she received in the ER, hence leading to her current hospital admission. Patient understand that her COVID-19 diagnosis could cause her glucose levels to spike, and that it is imperative that she never goes without any insulin. She states that she has a CGM at home and that she has only had two hypoglycemic episodes in the last month, both symptomatic. Her appetite at home remains poor d/t suspected gastroparesis, but she has been trying to eat three meals daily. She states she is due for a colonoscopy in January. I once again encouraged her to please follow up with us in outpatient diabetes education. Evaluation/Plan/Recommendations:   Patient's understanding of diabetes:   []Poor   [x]Fair    []Good   [] Excellent     Outpatient diabetes education is recommended:   [x] Yes  [] No   [] As needed  Patient is interested in outpatient diabetes education:   [x] Yes    [] No    [] Unsure    Recommended:     [x] Consult to social work/case management for Иван Poe for treatment compliance     [] Script for glucometer and supplies (per preference of patient's insurance)               [x] Script for outpatient diabetes education classes from PCP    [x] Carbohydrate-controlled diet    [x] Continue to follow with endocrinologist, Dr. Kassie Shepherd     Thank you for this consult.     Mendez Fleming RN, BSN, [facility]

## 2020-10-09 NOTE — PROGRESS NOTES
Critical Care Team - Daily Progress Note      Date and time: 10/9/2020 7:43 AM  Patient's name:  Gab Banegas  Medical Record Number: [id number]  Patient's account/billing number: [de-identified]  Patient's YOB: 1995  Age: 27 y.o. Date of Admission: 10/8/2020 11:39 AM  Length of stay during current admission: 1      Primary Care Physician: Misha Strickland DO  ICU Attending Physician: Dr. Sharon Hirsch Status: Full Code    Reason for ICU admission: DKA  43-year-old female presenting in DKA and tested positive for COVID. Patient was not using her insulin appropriately at home. Here in the ICU patient was started on insulin drip. Labs have been trending down appropriately and anion gap is closed. Patient states that she is feeling better. Abdominal pain and back pain have both improved. Pt has previously been seen by Dr. Rigo Lopez endocrinology. SUBJECTIVE:     OVERNIGHT EVENTS:      10/9/2020 anion gap is closed x2 and abdominal pain and back pain have both improved.      AWAKE & FOLLOWING COMMANDS:  [] No   [x] Yes    CURRENT VENTILATION STATUS:     [] Ventilator  [] BIPAP  [] Nasal Cannula [x] Room Air      IF INTUBATED, ET TUBE MARKING AT LOWER LIP:       cms    SECRETIONS Amount:  [] Small [] Moderate  [] Large  [x] None  Color:     [] White [] Colored  [] Bloody    SEDATION:  RAAS Score:  [] Propofol gtt  [] Versed gtt  [] Ativan gtt   [x] No Sedation    PARALYZED:  [x] No    [] Yes    DIARRHEA:                [x] No                [] Yes  (C. Difficile status: [] positive                                                                                                                       [] negative                                                                                                                     [] pending)    VASOPRESSORS:  [x] No    [] Yes    If yes -   [] Levophed       [] Dopamine     [] Vasopressin       [] Dobutamine  [] Phenylephrine         [] Epinephrine    CENTRAL LINES:     [x] No   [] Yes   (Date of Insertion:   )           If yes -     [] Right IJ     [] Left IJ [] Right Femoral [] Left Femoral                   [] Right Subclavian [] Left Subclavian       CROFT'S CATHETER:   [x] No   [] Yes  (Date of Insertion:   )         OBJECTIVE:     VITAL SIGNS:  /60   Pulse 114   Temp 98 °F (36.7 °C) (Oral)   Resp 13   Ht 5' 1\" (1.549 m)   Wt 89 lb 1.1 oz (40.4 kg)   SpO2 100%   BMI 16.83 kg/m²   Tmax over 24 hours:  Temp (24hrs), Av.3 °F (36.8 °C), Min:97.5 °F (36.4 °C), Max:98.9 °F (37.2 °C)      Patient Vitals for the past 6 hrs:   BP Temp Temp src Pulse Resp SpO2   10/09/20 0700 109/60 -- -- 114 13 100 %   10/09/20 0600 (!) 96/51 -- -- 115 16 100 %   10/09/20 0500 103/66 -- -- 117 16 100 %   10/09/20 0400 (!) 82/40 98 °F (36.7 °C) Oral 115 13 95 %   10/09/20 0337 (!) 82/40 -- -- 117 23 100 %   10/09/20 0300 (!) 85/44 -- -- 117 14 --   10/09/20 0256 (!) 85/44 -- -- 116 22 100 %   10/09/20 0200 (!) 85/46 -- -- 122 15 100 %         Intake/Output Summary (Last 24 hours) at 10/9/2020 0743  Last data filed at 10/9/2020 0514  Gross per 24 hour   Intake 3330 ml   Output 275 ml   Net 3055 ml     Wt Readings from Last 2 Encounters:   10/09/20 89 lb 1.1 oz (40.4 kg)   10/07/20 87 lb (39.5 kg)     Body mass index is 16.83 kg/m². PHYSICAL EXAMINATION:    General appearance -patient is alert and awake appears fatigued   mental status - alert, oriented to person, place, and time, normal mood, behavior, speech, dress, motor activity, and thought processes  Eyes - pupils equal and reactive, extraocular eye movements intact, sclera anicteric  Ears - external ear normal  Nose - normal and patent, no erythema, discharge or polyps  Mouth - mucous membranes moist, pharynx is clear.   Neck - supple, no significant adenopathy  Chest - clear to auscultation, no wheezes, rales or rhonchi, symmetric air entry  Heart - normal rate, regular rhythm, normal S1, S2, no murmurs, rubs, clicks or gallops  Abdomen -patient has mild generalized abdominal tenderness to palpation. Neurological - alert, oriented, normal speech, no focal findings or movement disorder noted  Musculoskeletal-patient with bilateral paraspinal lumbar tenderness to palpation. No wounds or rashes over the back. There is no midline tenderness, no step-offs no deformity. Extremities - peripheral pulses normal, Patient has bilateral calf tenderness to palpation. No calf swelling or erythema noted.   Skin - normal coloration and turgor, no rashes, no suspicious skin lesions noted         Any additional physical findings:    MEDICATIONS:    Scheduled Meds:   sodium chloride flush  10 mL Intravenous 2 times per day    enoxaparin  40 mg Subcutaneous Daily    insulin glargine  15 Units Subcutaneous Nightly    insulin lispro  4 Units Subcutaneous TID WC    insulin lispro  0-6 Units Subcutaneous TID WC    insulin lispro  0-3 Units Subcutaneous Nightly    orphenadrine  60 mg Intramuscular Once    lidocaine  1 patch Transdermal Daily     Continuous Infusions:   dextrose      dextrose      dextrose 5% and 0.45% NaCl with KCl 20 mEq      sodium chloride Stopped (10/08/20 2240)     PRN Meds:   sodium chloride flush, 10 mL, PRN  acetaminophen, 650 mg, Q6H PRN    Or  acetaminophen, 650 mg, Q6H PRN  polyethylene glycol, 17 g, Daily PRN  promethazine, 12.5 mg, Q6H PRN    Or  ondansetron, 4 mg, Q6H PRN  glucose, 15 g, PRN  dextrose, 12.5 g, PRN  glucagon (rDNA), 1 mg, PRN  dextrose, 100 mL/hr, PRN  sodium chloride flush, 10 mL, PRN  glucose, 15 g, PRN  dextrose, 12.5 g, PRN  glucagon (rDNA), 1 mg, PRN  dextrose, 100 mL/hr, PRN  dextrose, 12.5 g, PRN  potassium chloride, 10 mEq, PRN  magnesium sulfate, 1 g, PRN  sodium phosphate IVPB, 10 mmol, PRN    Or  sodium phosphate IVPB, 15 mmol, PRN    Or  sodium phosphate IVPB, 20 mmol, PRN  dextrose 5% and 0.45% NaCl with KCl 20 mEq, , Continuous PRN  morphine, 2 mg, Q4H PRN          VENT SETTINGS (Comprehensive) (if applicable):  Vent Information  SpO2: 100 %  Additional Respiratory  Assessments  Pulse: 114  Resp: 13  SpO2: 100 %    ABG  Lab Results   Component Value Date    PH 7.395 05/31/2020    PCO2 37.7 05/31/2020    PO2 50.7 05/31/2020    HCO3 22.6 05/31/2020    O2SAT 82.6 05/31/2020         Laboratory findings:    Complete Blood Count:   Recent Labs     10/07/20  1545 10/08/20  1206 10/09/20  0345   WBC 7.2 19.9* 14.9*   HGB 12.5 12.0 9.3*   HCT 37.7 37.9 27.8*    339 304        Lactic Acid  Lab Results   Component Value Date    LACTA 1.5 10/09/2020    LACTA 2.1 10/08/2020    LACTA 2.5 10/08/2020        Last 3 Blood Glucose:   Recent Labs     10/08/20  2010 10/09/20  0005 10/09/20  0345   GLUCOSE 309* 209* 144*        PT/INR:    Lab Results   Component Value Date    PROTIME 11.6 02/10/2019    INR 1.0 02/10/2019     PTT:    Lab Results   Component Value Date    APTT 35.3 02/10/2019       Comprehensive Metabolic Profile:   Recent Labs     10/08/20  2010 10/09/20  0005 10/09/20  0345    139 138   K 4.9 4.1 4.1    110* 109*   CO2 8* 16* 19*   BUN 19 17 16   CREATININE 0.6 0.6 0.6   GLUCOSE 309* 209* 144*   CALCIUM 9.2 9.1 9.0   PROT  --   --  5.9*   LABALBU  --   --  3.4*   BILITOT  --   --  0.4   ALKPHOS  --   --  67   AST  --   --  19   ALT  --   --  15      Magnesium:   Lab Results   Component Value Date    MG 2.4 10/09/2020     Phosphorus:   Lab Results   Component Value Date    PHOS 2.7 10/09/2020     Ionized Calcium: No results found for: DOMENICO       Urinalysis:     Troponin: No results for input(s): TROPONINI in the last 72 hours. Cultures     No results for input(s): BC in the last 72 hours. No results for input(s): Aye Ibarra in the last 72 hours. No results for input(s): LABURIN in the last 72 hours.     Other pertinent Labs:       Radiology/Imaging:   Xr Chest Portable    Result Date: 10/8/2020  EXAMINATION: ONE XRAY VIEW OF THE CHEST rounds were conducted with the MICU team. The case was discussed in detail and plans for care were established. Review of Residents documentation was conducted and revisions were made as appropriate. I agree with the above documented exam, problem list and plan of care. Stop bicarb gtt   Ok for transfer to covid unit   Ratna Garcia M.D.    Pulmonary/Critical Care Medicine   33 min cct excluding procedures

## 2020-10-09 NOTE — FLOWSHEET NOTE
Okay for DC home per Dr. Jovan Lofton. He will call her and tell her to adjust her settings on her pump.

## 2020-10-10 ENCOUNTER — APPOINTMENT (OUTPATIENT)
Dept: GENERAL RADIOLOGY | Age: 25
DRG: 420 | End: 2020-10-10
Payer: COMMERCIAL

## 2020-10-10 ENCOUNTER — CARE COORDINATION (OUTPATIENT)
Dept: CASE MANAGEMENT | Age: 25
End: 2020-10-10

## 2020-10-10 ENCOUNTER — HOSPITAL ENCOUNTER (EMERGENCY)
Age: 25
Discharge: HOME | DRG: 420 | End: 2020-10-10
Attending: EMERGENCY MEDICINE
Payer: COMMERCIAL

## 2020-10-10 VITALS
RESPIRATION RATE: 16 BRPM | WEIGHT: 90 LBS | HEART RATE: 76 BPM | SYSTOLIC BLOOD PRESSURE: 176 MMHG | TEMPERATURE: 98.2 F | OXYGEN SATURATION: 100 % | BODY MASS INDEX: 16.99 KG/M2 | HEIGHT: 61 IN | DIASTOLIC BLOOD PRESSURE: 109 MMHG

## 2020-10-10 LAB
ANION GAP SERPL CALCULATED.3IONS-SCNC: 10 MMOL/L (ref 7–16)
ANION GAP SERPL CALCULATED.3IONS-SCNC: 11 MMOL/L (ref 7–16)
BASOPHILS ABSOLUTE: 0.02 E9/L (ref 0–0.2)
BASOPHILS RELATIVE PERCENT: 0.4 % (ref 0–2)
BETA-HYDROXYBUTYRATE: 2.22 MMOL/L (ref 0.02–0.27)
BUN BLDV-MCNC: 7 MG/DL (ref 6–20)
BUN BLDV-MCNC: 7 MG/DL (ref 6–20)
CALCIUM SERPL-MCNC: 9.5 MG/DL (ref 8.6–10.2)
CALCIUM SERPL-MCNC: 9.7 MG/DL (ref 8.6–10.2)
CHLORIDE BLD-SCNC: 94 MMOL/L (ref 98–107)
CHLORIDE BLD-SCNC: 96 MMOL/L (ref 98–107)
CHP ED QC CHECK: NORMAL
CHP ED QC CHECK: NORMAL
CO2: 25 MMOL/L (ref 22–29)
CO2: 27 MMOL/L (ref 22–29)
CREAT SERPL-MCNC: 0.4 MG/DL (ref 0.5–1)
CREAT SERPL-MCNC: 0.5 MG/DL (ref 0.5–1)
EOSINOPHILS ABSOLUTE: 0.01 E9/L (ref 0.05–0.5)
EOSINOPHILS RELATIVE PERCENT: 0.2 % (ref 0–6)
GFR AFRICAN AMERICAN: >60
GFR AFRICAN AMERICAN: >60
GFR NON-AFRICAN AMERICAN: >60 ML/MIN/1.73
GFR NON-AFRICAN AMERICAN: >60 ML/MIN/1.73
GLUCOSE BLD-MCNC: 286 MG/DL
GLUCOSE BLD-MCNC: 323 MG/DL
GLUCOSE BLD-MCNC: 323 MG/DL (ref 74–99)
GLUCOSE BLD-MCNC: 332 MG/DL (ref 74–99)
HCT VFR BLD CALC: 32.1 % (ref 34–48)
HEMOGLOBIN: 10.7 G/DL (ref 11.5–15.5)
IMMATURE GRANULOCYTES #: 0.01 E9/L
IMMATURE GRANULOCYTES %: 0.2 % (ref 0–5)
LYMPHOCYTES ABSOLUTE: 1.84 E9/L (ref 1.5–4)
LYMPHOCYTES RELATIVE PERCENT: 35 % (ref 20–42)
MCH RBC QN AUTO: 27.8 PG (ref 26–35)
MCHC RBC AUTO-ENTMCNC: 33.3 % (ref 32–34.5)
MCV RBC AUTO: 83.4 FL (ref 80–99.9)
METER GLUCOSE: 251 MG/DL (ref 74–99)
METER GLUCOSE: 286 MG/DL (ref 74–99)
METER GLUCOSE: 293 MG/DL (ref 74–99)
MONOCYTES ABSOLUTE: 0.28 E9/L (ref 0.1–0.95)
MONOCYTES RELATIVE PERCENT: 5.3 % (ref 2–12)
MRSA CULTURE ONLY: NORMAL
NEUTROPHILS ABSOLUTE: 3.09 E9/L (ref 1.8–7.3)
NEUTROPHILS RELATIVE PERCENT: 58.9 % (ref 43–80)
ORGANISM: ABNORMAL
PDW BLD-RTO: 13.1 FL (ref 11.5–15)
PH VENOUS: 7.44 (ref 7.35–7.45)
PLATELET # BLD: 240 E9/L (ref 130–450)
PMV BLD AUTO: 11.2 FL (ref 7–12)
POTASSIUM REFLEX MAGNESIUM: 5.1 MMOL/L (ref 3.5–5)
POTASSIUM SERPL-SCNC: 4.1 MMOL/L (ref 3.5–5)
RBC # BLD: 3.85 E12/L (ref 3.5–5.5)
REASON FOR REJECTION: NORMAL
REJECTED TEST: NORMAL
SODIUM BLD-SCNC: 130 MMOL/L (ref 132–146)
SODIUM BLD-SCNC: 133 MMOL/L (ref 132–146)
URINE CULTURE, ROUTINE: ABNORMAL
WBC # BLD: 5.3 E9/L (ref 4.5–11.5)

## 2020-10-10 PROCEDURE — 71045 X-RAY EXAM CHEST 1 VIEW: CPT

## 2020-10-10 PROCEDURE — 36415 COLL VENOUS BLD VENIPUNCTURE: CPT

## 2020-10-10 PROCEDURE — 6360000002 HC RX W HCPCS: Performed by: FAMILY MEDICINE

## 2020-10-10 PROCEDURE — 6370000000 HC RX 637 (ALT 250 FOR IP): Performed by: FAMILY MEDICINE

## 2020-10-10 PROCEDURE — 2580000003 HC RX 258: Performed by: FAMILY MEDICINE

## 2020-10-10 PROCEDURE — 82800 BLOOD PH: CPT

## 2020-10-10 PROCEDURE — 99284 EMERGENCY DEPT VISIT MOD MDM: CPT

## 2020-10-10 PROCEDURE — 82962 GLUCOSE BLOOD TEST: CPT

## 2020-10-10 PROCEDURE — 85025 COMPLETE CBC W/AUTO DIFF WBC: CPT

## 2020-10-10 PROCEDURE — 82010 KETONE BODYS QUAN: CPT

## 2020-10-10 PROCEDURE — 80048 BASIC METABOLIC PNL TOTAL CA: CPT

## 2020-10-10 PROCEDURE — 99283 EMERGENCY DEPT VISIT LOW MDM: CPT

## 2020-10-10 PROCEDURE — 96372 THER/PROPH/DIAG INJ SC/IM: CPT

## 2020-10-10 RX ORDER — 0.9 % SODIUM CHLORIDE 0.9 %
1000 INTRAVENOUS SOLUTION INTRAVENOUS ONCE
Status: COMPLETED | OUTPATIENT
Start: 2020-10-10 | End: 2020-10-10

## 2020-10-10 RX ORDER — KETOROLAC TROMETHAMINE 30 MG/ML
30 INJECTION, SOLUTION INTRAMUSCULAR; INTRAVENOUS ONCE
Status: COMPLETED | OUTPATIENT
Start: 2020-10-10 | End: 2020-10-10

## 2020-10-10 RX ORDER — KETOROLAC TROMETHAMINE 10 MG/1
10 TABLET, FILM COATED ORAL EVERY 6 HOURS PRN
Qty: 16 TABLET | Refills: 0 | Status: SHIPPED | OUTPATIENT
Start: 2020-10-10 | End: 2020-11-10

## 2020-10-10 RX ADMIN — KETOROLAC TROMETHAMINE 30 MG: 30 INJECTION, SOLUTION INTRAMUSCULAR; INTRAVENOUS at 16:32

## 2020-10-10 RX ADMIN — SODIUM CHLORIDE 1000 ML: 9 INJECTION, SOLUTION INTRAVENOUS at 16:32

## 2020-10-10 RX ADMIN — INSULIN HUMAN 5 UNITS: 100 INJECTION, SOLUTION PARENTERAL at 18:24

## 2020-10-10 RX ADMIN — SODIUM CHLORIDE 1000 ML: 9 INJECTION, SOLUTION INTRAVENOUS at 17:59

## 2020-10-10 ASSESSMENT — ENCOUNTER SYMPTOMS
SORE THROAT: 0
ABDOMINAL PAIN: 1
PHOTOPHOBIA: 0
SHORTNESS OF BREATH: 0
NAUSEA: 0
SINUS PAIN: 0
VOMITING: 0
BACK PAIN: 1
WHEEZING: 0
DIARRHEA: 0

## 2020-10-10 ASSESSMENT — PAIN SCALES - GENERAL
PAINLEVEL_OUTOF10: 4
PAINLEVEL_OUTOF10: 3

## 2020-10-10 NOTE — ED NOTES
Bed: 12  Expected date:   Expected time:   Means of arrival:   Comments:  ems     Gen Bean RN  10/10/20 5340

## 2020-10-10 NOTE — ED NOTES
Consulted to the bedside of patient having difficulty getting IV access. PROCEDURE  10/10/20       Time: 1209    PERIPHERAL LINE INSERTION  Risks, benefits and alternatives (for applicable procedures below) described. Performed By: Dixon Johnson DO. Indication: inability to gain peripheral access. Informed consent: The patient provided verbal consent for this procedure. .  Procedure: After routine sterile preparation, a 20g catheter was placed using ultrasound guidance in right upper extremity and secured by standard fashion. Ultrasound Guidance:   used. Number of Attempts: 1  Patient tolerated the procedure well.             Dixon Johnson DO  10/10/20 1881

## 2020-10-10 NOTE — ED PROVIDER NOTES
TRINI Michele is a 27 y.o. female with a PMHx significant for type 1 diabetes who presents with fatigue, sweats, abdominal and back pain. The patient states that the symptoms began yesterday. She was admitted at this hospital yesterday for DKA and discharged home. She was also incidentally found to have a COVID-19 infection. Since yesterday she has just had worsening fatigue, sweats and generalized pain. She states the worst pain is in her back and abdomen but she is having pain in her extremities as well. She has not tried taking any pain medication. She has been checking her sugars since discharge, states her sugar this morning was approximately 280. While being transported here by EMS she said her sugar was over 300. She states she has been compliant with her subcu insulin regimen since discharge. She has not restarted her pump. She denies shortness of breath or chest pain, has not had a measured fever only sweating. . The patient states that nothing makes the symptoms better and movement makes them worse. The patient has tried nothing for this condition without getting meaningful relief of symptoms. The patient denies recent trauma, chills, HA, dizziness, vision changes, chest pain, palpitations, hx of MI, SOB, cough, wheezing, N/V/D/C, hematochezia, melena, dysuria, hematuria, generalized weakness and paresthesias. The patient is currently taking no blood thinners. Tobacco Hx:   reports that she has never smoked. She has never used smokeless tobacco.    Alcohol Hx:   reports no history of alcohol use. Illicit Drug Hx:    The history is provided by the patient. Review of Systems   Constitutional: Positive for diaphoresis and fatigue. Negative for chills and fever. HENT: Negative for sinus pain and sore throat. Eyes: Negative for photophobia and visual disturbance. Respiratory: Negative for shortness of breath and wheezing.     Cardiovascular: Negative for chest pain, palpitations and leg swelling. Gastrointestinal: Positive for abdominal pain. Negative for diarrhea, nausea and vomiting. Genitourinary: Negative for dysuria and hematuria. Musculoskeletal: Positive for back pain and myalgias. Negative for joint swelling. Skin: Negative for pallor and rash. Neurological: Negative for dizziness, tremors, syncope, light-headedness and numbness. Psychiatric/Behavioral: Negative for agitation and confusion. Physical Exam  Vitals signs reviewed. Constitutional:       General: She is not in acute distress. Appearance: Normal appearance. She is not ill-appearing or diaphoretic. HENT:      Head: Normocephalic and atraumatic. Mouth/Throat:      Mouth: Mucous membranes are moist.      Pharynx: Oropharynx is clear. No oropharyngeal exudate or posterior oropharyngeal erythema. Eyes:      Conjunctiva/sclera: Conjunctivae normal.      Pupils: Pupils are equal, round, and reactive to light. Cardiovascular:      Rate and Rhythm: Normal rate and regular rhythm. Heart sounds: Normal heart sounds. No murmur. Pulmonary:      Effort: Pulmonary effort is normal. No respiratory distress. Breath sounds: Normal breath sounds. No wheezing, rhonchi or rales. Abdominal:      General: Bowel sounds are normal. There is no distension. Palpations: Abdomen is soft. Tenderness: There is abdominal tenderness (Generalized). Musculoskeletal: Normal range of motion. General: Tenderness (Tenderness to palpation on extremities) present. No swelling, deformity or signs of injury. Right lower leg: No edema. Left lower leg: No edema. Skin:     General: Skin is warm and dry. Findings: No bruising or lesion. Neurological:      General: No focal deficit present. Mental Status: She is alert and oriented to person, place, and time. Sensory: No sensory deficit. Motor: No weakness.           Patient presents to the ED for fatigue, body aches. Differential diagnoses included but not limited to DKA, sequelae of COVID-19. Workup in the ED revealed elevated blood sugar but normal anion gap and beta hydroxybutyrate of slightly over 2. Venous pH within normal limits. Patient was given 2 L of normal saline, 5 subcu of regular insulin and IM Toradol for their symptoms with good improvement. Patient not in DKA at this time and myalgias improving with Toradol. Patient will be given a short course of oral Toradol as an outpatient and given instructions on COVID-19. Patient was under percent on room air through her entire ER stay and did not desaturate on ambulation. Patient continues to be non-toxic on re-evaluation. Findings were discussed with the patient and reasons to immediately return to the ED were articulated to them. They will follow-up with their PMD and endocrinology. ED Course as of Oct 10 1914   Sat Oct 10, 2020   8460 Patient given 2 L of normal saline and 5 units of regular insulin. Ordered recheck of POCT glucose. Patient not in DKA at this time.    []   1911 Repeat blood glucose is 251 after administration of insulin. Patient is feeling better after IM Toradol. She will be discharged with a short 4-day course of oral Toradol as needed. []      ED Course User Index  [] Denilson Garland MD       --------------------------------------------- PAST HISTORY ---------------------------------------------  Past Medical History:  has a past medical history of Bleeding at insertion site, Common femoral artery injury, right, initial encounter, and DM type 1 (diabetes mellitus, type 1) ([facility] [address] Past Surgical History:  has a past surgical history that includes Abdomen surgery (N/A, 5/9/2019) and Bartholin gland cyst excision. Social History:  reports that she has never smoked. She has never used smokeless tobacco. She reports that she does not drink alcohol or use drugs.     Family History: family history includes Diabetes in

## 2020-10-10 NOTE — ED NOTES
This RN attempted US IV. No success. Pt refusing further IV therapy. Will notify physician.       Chasity Tripp RN  10/10/20 3662

## 2020-10-11 ENCOUNTER — APPOINTMENT (OUTPATIENT)
Dept: GENERAL RADIOLOGY | Age: 25
End: 2020-10-11
Payer: COMMERCIAL

## 2020-10-11 ENCOUNTER — HOSPITAL ENCOUNTER (EMERGENCY)
Age: 25
Discharge: ANOTHER HOSPITAL | End: 2020-10-12
Attending: EMERGENCY MEDICINE
Payer: COMMERCIAL

## 2020-10-11 ENCOUNTER — APPOINTMENT (OUTPATIENT)
Dept: CT IMAGING | Age: 25
End: 2020-10-11
Payer: COMMERCIAL

## 2020-10-11 LAB
ALBUMIN SERPL-MCNC: 3.9 G/DL (ref 3.5–5.2)
ALP BLD-CCNC: 83 U/L (ref 35–104)
ALT SERPL-CCNC: 19 U/L (ref 0–32)
ANION GAP SERPL CALCULATED.3IONS-SCNC: 27 MMOL/L (ref 7–16)
AST SERPL-CCNC: 21 U/L (ref 0–31)
BASOPHILS ABSOLUTE: 0.03 E9/L (ref 0–0.2)
BASOPHILS RELATIVE PERCENT: 0.5 % (ref 0–2)
BILIRUB SERPL-MCNC: 0.5 MG/DL (ref 0–1.2)
BUN BLDV-MCNC: 12 MG/DL (ref 6–20)
CALCIUM SERPL-MCNC: 9.3 MG/DL (ref 8.6–10.2)
CHLORIDE BLD-SCNC: 95 MMOL/L (ref 98–107)
CO2: 13 MMOL/L (ref 22–29)
CREAT SERPL-MCNC: 0.5 MG/DL (ref 0.5–1)
D DIMER: <200 NG/ML DDU
EOSINOPHILS ABSOLUTE: 0.01 E9/L (ref 0.05–0.5)
EOSINOPHILS RELATIVE PERCENT: 0.2 % (ref 0–6)
GFR AFRICAN AMERICAN: >60
GFR NON-AFRICAN AMERICAN: >60 ML/MIN/1.73
GLUCOSE BLD-MCNC: 352 MG/DL (ref 74–99)
HCT VFR BLD CALC: 34.6 % (ref 34–48)
HEMOGLOBIN: 11.4 G/DL (ref 11.5–15.5)
IMMATURE GRANULOCYTES #: 0.02 E9/L
IMMATURE GRANULOCYTES %: 0.3 % (ref 0–5)
LACTIC ACID: 1.1 MMOL/L (ref 0.5–2.2)
LIPASE: 7 U/L (ref 13–60)
LYMPHOCYTES ABSOLUTE: 2.15 E9/L (ref 1.5–4)
LYMPHOCYTES RELATIVE PERCENT: 35.9 % (ref 20–42)
MCH RBC QN AUTO: 27.6 PG (ref 26–35)
MCHC RBC AUTO-ENTMCNC: 32.9 % (ref 32–34.5)
MCV RBC AUTO: 83.8 FL (ref 80–99.9)
METER GLUCOSE: 336 MG/DL (ref 74–99)
MONOCYTES ABSOLUTE: 0.36 E9/L (ref 0.1–0.95)
MONOCYTES RELATIVE PERCENT: 6 % (ref 2–12)
NEUTROPHILS ABSOLUTE: 3.42 E9/L (ref 1.8–7.3)
NEUTROPHILS RELATIVE PERCENT: 57.1 % (ref 43–80)
PDW BLD-RTO: 12.8 FL (ref 11.5–15)
PH VENOUS: 7.26 (ref 7.35–7.45)
PLATELET # BLD: 226 E9/L (ref 130–450)
PMV BLD AUTO: 11.3 FL (ref 7–12)
POTASSIUM SERPL-SCNC: 4.2 MMOL/L (ref 3.5–5)
RBC # BLD: 4.13 E12/L (ref 3.5–5.5)
SODIUM BLD-SCNC: 135 MMOL/L (ref 132–146)
TOTAL PROTEIN: 7 G/DL (ref 6.4–8.3)
TROPONIN: <0.01 NG/ML (ref 0–0.03)
WBC # BLD: 6 E9/L (ref 4.5–11.5)

## 2020-10-11 PROCEDURE — 72072 X-RAY EXAM THORAC SPINE 3VWS: CPT

## 2020-10-11 PROCEDURE — 72110 X-RAY EXAM L-2 SPINE 4/>VWS: CPT

## 2020-10-11 PROCEDURE — 83735 ASSAY OF MAGNESIUM: CPT

## 2020-10-11 PROCEDURE — 83690 ASSAY OF LIPASE: CPT

## 2020-10-11 PROCEDURE — 85025 COMPLETE CBC W/AUTO DIFF WBC: CPT

## 2020-10-11 PROCEDURE — 6370000000 HC RX 637 (ALT 250 FOR IP): Performed by: EMERGENCY MEDICINE

## 2020-10-11 PROCEDURE — 84484 ASSAY OF TROPONIN QUANT: CPT

## 2020-10-11 PROCEDURE — 85378 FIBRIN DEGRADE SEMIQUANT: CPT

## 2020-10-11 PROCEDURE — 93005 ELECTROCARDIOGRAM TRACING: CPT | Performed by: EMERGENCY MEDICINE

## 2020-10-11 PROCEDURE — 72125 CT NECK SPINE W/O DYE: CPT

## 2020-10-11 PROCEDURE — 82010 KETONE BODYS QUAN: CPT

## 2020-10-11 PROCEDURE — 6360000002 HC RX W HCPCS: Performed by: EMERGENCY MEDICINE

## 2020-10-11 PROCEDURE — 2580000003 HC RX 258: Performed by: EMERGENCY MEDICINE

## 2020-10-11 PROCEDURE — 82962 GLUCOSE BLOOD TEST: CPT

## 2020-10-11 PROCEDURE — 70450 CT HEAD/BRAIN W/O DYE: CPT

## 2020-10-11 PROCEDURE — 99284 EMERGENCY DEPT VISIT MOD MDM: CPT

## 2020-10-11 PROCEDURE — 82800 BLOOD PH: CPT

## 2020-10-11 PROCEDURE — 71045 X-RAY EXAM CHEST 1 VIEW: CPT

## 2020-10-11 PROCEDURE — 73502 X-RAY EXAM HIP UNI 2-3 VIEWS: CPT

## 2020-10-11 PROCEDURE — 84100 ASSAY OF PHOSPHORUS: CPT

## 2020-10-11 PROCEDURE — 96365 THER/PROPH/DIAG IV INF INIT: CPT

## 2020-10-11 PROCEDURE — 83605 ASSAY OF LACTIC ACID: CPT

## 2020-10-11 PROCEDURE — 99285 EMERGENCY DEPT VISIT HI MDM: CPT

## 2020-10-11 PROCEDURE — 80053 COMPREHEN METABOLIC PANEL: CPT

## 2020-10-11 PROCEDURE — 96375 TX/PRO/DX INJ NEW DRUG ADDON: CPT

## 2020-10-11 RX ORDER — ONDANSETRON 2 MG/ML
4 INJECTION INTRAMUSCULAR; INTRAVENOUS EVERY 6 HOURS PRN
Status: DISCONTINUED | OUTPATIENT
Start: 2020-10-11 | End: 2020-10-12 | Stop reason: HOSPADM

## 2020-10-11 RX ORDER — POTASSIUM CHLORIDE 7.45 MG/ML
10 INJECTION INTRAVENOUS PRN
Status: DISCONTINUED | OUTPATIENT
Start: 2020-10-11 | End: 2020-10-12 | Stop reason: HOSPADM

## 2020-10-11 RX ORDER — DEXTROSE AND SODIUM CHLORIDE 5; .45 G/100ML; G/100ML
INJECTION, SOLUTION INTRAVENOUS CONTINUOUS PRN
Status: DISCONTINUED | OUTPATIENT
Start: 2020-10-11 | End: 2020-10-12 | Stop reason: HOSPADM

## 2020-10-11 RX ORDER — DEXTROSE MONOHYDRATE 25 G/50ML
12.5 INJECTION, SOLUTION INTRAVENOUS PRN
Status: DISCONTINUED | OUTPATIENT
Start: 2020-10-11 | End: 2020-10-12 | Stop reason: HOSPADM

## 2020-10-11 RX ORDER — MAGNESIUM SULFATE 1 G/100ML
1 INJECTION INTRAVENOUS PRN
Status: DISCONTINUED | OUTPATIENT
Start: 2020-10-11 | End: 2020-10-12 | Stop reason: HOSPADM

## 2020-10-11 RX ORDER — 0.9 % SODIUM CHLORIDE 0.9 %
600 INTRAVENOUS SOLUTION INTRAVENOUS ONCE
Status: COMPLETED | OUTPATIENT
Start: 2020-10-11 | End: 2020-10-12

## 2020-10-11 RX ORDER — 0.9 % SODIUM CHLORIDE 0.9 %
1000 INTRAVENOUS SOLUTION INTRAVENOUS ONCE
Status: COMPLETED | OUTPATIENT
Start: 2020-10-11 | End: 2020-10-11

## 2020-10-11 RX ORDER — MORPHINE SULFATE 2 MG/ML
2 INJECTION, SOLUTION INTRAMUSCULAR; INTRAVENOUS ONCE
Status: COMPLETED | OUTPATIENT
Start: 2020-10-11 | End: 2020-10-11

## 2020-10-11 RX ORDER — SODIUM CHLORIDE 9 MG/ML
INJECTION, SOLUTION INTRAVENOUS CONTINUOUS
Status: DISCONTINUED | OUTPATIENT
Start: 2020-10-12 | End: 2020-10-12 | Stop reason: HOSPADM

## 2020-10-11 RX ADMIN — MORPHINE SULFATE 2 MG: 2 INJECTION, SOLUTION INTRAMUSCULAR; INTRAVENOUS at 23:52

## 2020-10-11 RX ADMIN — SODIUM CHLORIDE: 9 INJECTION, SOLUTION INTRAVENOUS at 23:42

## 2020-10-11 RX ADMIN — SODIUM CHLORIDE 0.1 UNITS/KG/HR: 9 INJECTION, SOLUTION INTRAVENOUS at 23:54

## 2020-10-11 RX ADMIN — SODIUM CHLORIDE 1000 ML: 9 INJECTION, SOLUTION INTRAVENOUS at 22:37

## 2020-10-11 ASSESSMENT — PAIN SCALES - GENERAL
PAINLEVEL_OUTOF10: 10
PAINLEVEL_OUTOF10: 10

## 2020-10-12 ENCOUNTER — HOSPITAL ENCOUNTER (INPATIENT)
Age: 25
LOS: 1 days | Discharge: HOME | DRG: 420 | End: 2020-10-13
Attending: HOSPITALIST | Admitting: HOSPITALIST
Payer: COMMERCIAL

## 2020-10-12 ENCOUNTER — APPOINTMENT (OUTPATIENT)
Dept: GENERAL RADIOLOGY | Age: 25
DRG: 420 | End: 2020-10-12
Attending: HOSPITALIST
Payer: COMMERCIAL

## 2020-10-12 VITALS
BODY MASS INDEX: 16.62 KG/M2 | RESPIRATION RATE: 18 BRPM | SYSTOLIC BLOOD PRESSURE: 126 MMHG | WEIGHT: 88 LBS | OXYGEN SATURATION: 98 % | HEIGHT: 61 IN | HEART RATE: 104 BPM | DIASTOLIC BLOOD PRESSURE: 90 MMHG | TEMPERATURE: 98.3 F

## 2020-10-12 LAB
ANION GAP SERPL CALCULATED.3IONS-SCNC: 9 MMOL/L (ref 7–16)
BETA-HYDROXYBUTYRATE: >4.5 MMOL/L (ref 0.02–0.27)
BUN BLDV-MCNC: 6 MG/DL (ref 6–20)
CALCIUM SERPL-MCNC: 9 MG/DL (ref 8.6–10.2)
CHLORIDE BLD-SCNC: 105 MMOL/L (ref 98–107)
CO2: 20 MMOL/L (ref 22–29)
CREAT SERPL-MCNC: 0.4 MG/DL (ref 0.5–1)
EKG ATRIAL RATE: 112 BPM
EKG P AXIS: 73 DEGREES
EKG P-R INTERVAL: 132 MS
EKG Q-T INTERVAL: 330 MS
EKG QRS DURATION: 64 MS
EKG QTC CALCULATION (BAZETT): 450 MS
EKG R AXIS: 55 DEGREES
EKG T AXIS: 71 DEGREES
EKG VENTRICULAR RATE: 112 BPM
GFR AFRICAN AMERICAN: >60
GFR NON-AFRICAN AMERICAN: >60 ML/MIN/1.73
GLUCOSE BLD-MCNC: 224 MG/DL (ref 74–99)
HCT VFR BLD CALC: 30.7 % (ref 34–48)
HEMOGLOBIN: 10.3 G/DL (ref 11.5–15.5)
MAGNESIUM: 1.6 MG/DL (ref 1.6–2.6)
MAGNESIUM: 1.6 MG/DL (ref 1.6–2.6)
MCH RBC QN AUTO: 27.8 PG (ref 26–35)
MCHC RBC AUTO-ENTMCNC: 33.6 % (ref 32–34.5)
MCV RBC AUTO: 82.7 FL (ref 80–99.9)
METER GLUCOSE: 105 MG/DL (ref 74–99)
METER GLUCOSE: 129 MG/DL (ref 74–99)
METER GLUCOSE: 137 MG/DL (ref 74–99)
METER GLUCOSE: 176 MG/DL (ref 74–99)
METER GLUCOSE: 187 MG/DL (ref 74–99)
METER GLUCOSE: 200 MG/DL (ref 74–99)
METER GLUCOSE: 215 MG/DL (ref 74–99)
METER GLUCOSE: 218 MG/DL (ref 74–99)
METER GLUCOSE: 286 MG/DL (ref 74–99)
PDW BLD-RTO: 12.7 FL (ref 11.5–15)
PHOSPHORUS: 3.7 MG/DL (ref 2.5–4.5)
PHOSPHORUS: 4.8 MG/DL (ref 2.5–4.5)
PLATELET # BLD: 216 E9/L (ref 130–450)
PMV BLD AUTO: 10.3 FL (ref 7–12)
POTASSIUM SERPL-SCNC: 3.9 MMOL/L (ref 3.5–5)
RBC # BLD: 3.71 E12/L (ref 3.5–5.5)
SODIUM BLD-SCNC: 134 MMOL/L (ref 132–146)
WBC # BLD: 5.4 E9/L (ref 4.5–11.5)

## 2020-10-12 PROCEDURE — 36592 COLLECT BLOOD FROM PICC: CPT

## 2020-10-12 PROCEDURE — 82962 GLUCOSE BLOOD TEST: CPT

## 2020-10-12 PROCEDURE — 85027 COMPLETE CBC AUTOMATED: CPT

## 2020-10-12 PROCEDURE — 99254 IP/OBS CNSLTJ NEW/EST MOD 60: CPT | Performed by: INTERNAL MEDICINE

## 2020-10-12 PROCEDURE — 6360000002 HC RX W HCPCS: Performed by: INTERNAL MEDICINE

## 2020-10-12 PROCEDURE — 6370000000 HC RX 637 (ALT 250 FOR IP): Performed by: INTERNAL MEDICINE

## 2020-10-12 PROCEDURE — 2500000003 HC RX 250 WO HCPCS: Performed by: HOSPITALIST

## 2020-10-12 PROCEDURE — 97530 THERAPEUTIC ACTIVITIES: CPT

## 2020-10-12 PROCEDURE — 02HV33Z INSERTION OF INFUSION DEVICE INTO SUPERIOR VENA CAVA, PERCUTANEOUS APPROACH: ICD-10-PCS | Performed by: INTERNAL MEDICINE

## 2020-10-12 PROCEDURE — 84100 ASSAY OF PHOSPHORUS: CPT

## 2020-10-12 PROCEDURE — 93010 ELECTROCARDIOGRAM REPORT: CPT | Performed by: INTERNAL MEDICINE

## 2020-10-12 PROCEDURE — 36556 INSERT NON-TUNNEL CV CATH: CPT

## 2020-10-12 PROCEDURE — 80048 BASIC METABOLIC PNL TOTAL CA: CPT

## 2020-10-12 PROCEDURE — 99442 PR PHYS/QHP TELEPHONE EVALUATION 11-20 MIN: CPT | Performed by: PSYCHIATRY & NEUROLOGY

## 2020-10-12 PROCEDURE — 2060000000 HC ICU INTERMEDIATE R&B

## 2020-10-12 PROCEDURE — 2580000003 HC RX 258: Performed by: HOSPITALIST

## 2020-10-12 PROCEDURE — 87081 CULTURE SCREEN ONLY: CPT

## 2020-10-12 PROCEDURE — 36415 COLL VENOUS BLD VENIPUNCTURE: CPT

## 2020-10-12 PROCEDURE — 6360000002 HC RX W HCPCS: Performed by: HOSPITALIST

## 2020-10-12 PROCEDURE — 6360000002 HC RX W HCPCS: Performed by: EMERGENCY MEDICINE

## 2020-10-12 PROCEDURE — C1751 CATH, INF, PER/CENT/MIDLINE: HCPCS

## 2020-10-12 PROCEDURE — 6360000002 HC RX W HCPCS: Performed by: STUDENT IN AN ORGANIZED HEALTH CARE EDUCATION/TRAINING PROGRAM

## 2020-10-12 PROCEDURE — 71045 X-RAY EXAM CHEST 1 VIEW: CPT

## 2020-10-12 PROCEDURE — 97165 OT EVAL LOW COMPLEX 30 MIN: CPT

## 2020-10-12 PROCEDURE — 97161 PT EVAL LOW COMPLEX 20 MIN: CPT

## 2020-10-12 PROCEDURE — 2580000003 HC RX 258: Performed by: EMERGENCY MEDICINE

## 2020-10-12 PROCEDURE — 83735 ASSAY OF MAGNESIUM: CPT

## 2020-10-12 PROCEDURE — 99291 CRITICAL CARE FIRST HOUR: CPT | Performed by: HOSPITALIST

## 2020-10-12 PROCEDURE — 6370000000 HC RX 637 (ALT 250 FOR IP): Performed by: HOSPITALIST

## 2020-10-12 PROCEDURE — 2580000003 HC RX 258: Performed by: INTERNAL MEDICINE

## 2020-10-12 RX ORDER — METOCLOPRAMIDE 10 MG/1
10 TABLET ORAL 4 TIMES DAILY
Status: DISCONTINUED | OUTPATIENT
Start: 2020-10-12 | End: 2020-10-13 | Stop reason: HOSPADM

## 2020-10-12 RX ORDER — KETOROLAC TROMETHAMINE 30 MG/ML
15 INJECTION, SOLUTION INTRAMUSCULAR; INTRAVENOUS EVERY 6 HOURS PRN
Status: DISCONTINUED | OUTPATIENT
Start: 2020-10-12 | End: 2020-10-13 | Stop reason: HOSPADM

## 2020-10-12 RX ORDER — INSULIN GLARGINE 100 [IU]/ML
10 INJECTION, SOLUTION SUBCUTANEOUS ONCE
Status: COMPLETED | OUTPATIENT
Start: 2020-10-12 | End: 2020-10-12

## 2020-10-12 RX ORDER — PANTOPRAZOLE SODIUM 40 MG/1
40 TABLET, DELAYED RELEASE ORAL
Status: DISCONTINUED | OUTPATIENT
Start: 2020-10-12 | End: 2020-10-13 | Stop reason: HOSPADM

## 2020-10-12 RX ORDER — HYDROCODONE BITARTRATE AND ACETAMINOPHEN 5; 325 MG/1; MG/1
1 TABLET ORAL EVERY 6 HOURS PRN
Status: DISCONTINUED | OUTPATIENT
Start: 2020-10-12 | End: 2020-10-13 | Stop reason: HOSPADM

## 2020-10-12 RX ORDER — MIRTAZAPINE 15 MG/1
15 TABLET, FILM COATED ORAL NIGHTLY
Status: DISCONTINUED | OUTPATIENT
Start: 2020-10-12 | End: 2020-10-13 | Stop reason: HOSPADM

## 2020-10-12 RX ORDER — MORPHINE SULFATE 2 MG/ML
1 INJECTION, SOLUTION INTRAMUSCULAR; INTRAVENOUS ONCE
Status: COMPLETED | OUTPATIENT
Start: 2020-10-12 | End: 2020-10-12

## 2020-10-12 RX ORDER — ONDANSETRON 2 MG/ML
4 INJECTION INTRAMUSCULAR; INTRAVENOUS EVERY 6 HOURS PRN
Status: DISCONTINUED | OUTPATIENT
Start: 2020-10-12 | End: 2020-10-13 | Stop reason: HOSPADM

## 2020-10-12 RX ORDER — SODIUM CHLORIDE 0.9 % (FLUSH) 0.9 %
10 SYRINGE (ML) INJECTION EVERY 12 HOURS SCHEDULED
Status: DISCONTINUED | OUTPATIENT
Start: 2020-10-12 | End: 2020-10-13 | Stop reason: HOSPADM

## 2020-10-12 RX ORDER — POLYETHYLENE GLYCOL 3350 17 G/17G
17 POWDER, FOR SOLUTION ORAL DAILY PRN
Status: DISCONTINUED | OUTPATIENT
Start: 2020-10-12 | End: 2020-10-13 | Stop reason: HOSPADM

## 2020-10-12 RX ORDER — SODIUM CHLORIDE 9 MG/ML
INJECTION, SOLUTION INTRAVENOUS CONTINUOUS
Status: DISCONTINUED | OUTPATIENT
Start: 2020-10-12 | End: 2020-10-12

## 2020-10-12 RX ORDER — DEXTROSE MONOHYDRATE 25 G/50ML
12.5 INJECTION, SOLUTION INTRAVENOUS PRN
Status: DISCONTINUED | OUTPATIENT
Start: 2020-10-12 | End: 2020-10-13 | Stop reason: HOSPADM

## 2020-10-12 RX ORDER — 0.9 % SODIUM CHLORIDE 0.9 %
15 INTRAVENOUS SOLUTION INTRAVENOUS ONCE
Status: DISCONTINUED | OUTPATIENT
Start: 2020-10-12 | End: 2020-10-12

## 2020-10-12 RX ORDER — KETOROLAC TROMETHAMINE 30 MG/ML
15 INJECTION, SOLUTION INTRAMUSCULAR; INTRAVENOUS EVERY 6 HOURS PRN
Status: DISCONTINUED | OUTPATIENT
Start: 2020-10-14 | End: 2020-10-12 | Stop reason: SDUPTHER

## 2020-10-12 RX ORDER — PROMETHAZINE HYDROCHLORIDE 25 MG/1
12.5 TABLET ORAL EVERY 6 HOURS PRN
Status: DISCONTINUED | OUTPATIENT
Start: 2020-10-12 | End: 2020-10-13 | Stop reason: HOSPADM

## 2020-10-12 RX ORDER — KETOROLAC TROMETHAMINE 30 MG/ML
INJECTION, SOLUTION INTRAMUSCULAR; INTRAVENOUS
Status: COMPLETED
Start: 2020-10-12 | End: 2020-10-12

## 2020-10-12 RX ORDER — MAGNESIUM SULFATE 1 G/100ML
1 INJECTION INTRAVENOUS PRN
Status: DISCONTINUED | OUTPATIENT
Start: 2020-10-12 | End: 2020-10-12

## 2020-10-12 RX ORDER — ACETAMINOPHEN 325 MG/1
650 TABLET ORAL EVERY 6 HOURS PRN
Status: DISCONTINUED | OUTPATIENT
Start: 2020-10-12 | End: 2020-10-13 | Stop reason: HOSPADM

## 2020-10-12 RX ORDER — SODIUM CHLORIDE 0.9 % (FLUSH) 0.9 %
10 SYRINGE (ML) INJECTION PRN
Status: DISCONTINUED | OUTPATIENT
Start: 2020-10-12 | End: 2020-10-13 | Stop reason: HOSPADM

## 2020-10-12 RX ORDER — ACETAMINOPHEN 650 MG/1
650 SUPPOSITORY RECTAL EVERY 6 HOURS PRN
Status: DISCONTINUED | OUTPATIENT
Start: 2020-10-12 | End: 2020-10-13 | Stop reason: HOSPADM

## 2020-10-12 RX ORDER — POTASSIUM CHLORIDE 7.45 MG/ML
10 INJECTION INTRAVENOUS PRN
Status: DISCONTINUED | OUTPATIENT
Start: 2020-10-12 | End: 2020-10-12

## 2020-10-12 RX ORDER — DEXTROSE, SODIUM CHLORIDE, AND POTASSIUM CHLORIDE 5; .45; .15 G/100ML; G/100ML; G/100ML
INJECTION INTRAVENOUS CONTINUOUS PRN
Status: DISCONTINUED | OUTPATIENT
Start: 2020-10-12 | End: 2020-10-12

## 2020-10-12 RX ORDER — MIRTAZAPINE 15 MG/1
15 TABLET, FILM COATED ORAL NIGHTLY
Qty: 30 TABLET | Refills: 0 | Status: SHIPPED | OUTPATIENT
Start: 2020-10-12 | End: 2020-11-10

## 2020-10-12 RX ADMIN — HYDROCODONE BITARTRATE AND ACETAMINOPHEN 1 TABLET: 5; 325 TABLET ORAL at 20:12

## 2020-10-12 RX ADMIN — Medication 10 ML: at 09:14

## 2020-10-12 RX ADMIN — INSULIN LISPRO 1 UNITS: 100 INJECTION, SOLUTION INTRAVENOUS; SUBCUTANEOUS at 20:52

## 2020-10-12 RX ADMIN — MORPHINE SULFATE 1 MG: 2 INJECTION, SOLUTION INTRAMUSCULAR; INTRAVENOUS at 21:39

## 2020-10-12 RX ADMIN — POTASSIUM CHLORIDE 10 MEQ: 10 INJECTION, SOLUTION INTRAVENOUS at 00:00

## 2020-10-12 RX ADMIN — METOCLOPRAMIDE 10 MG: 10 TABLET ORAL at 20:12

## 2020-10-12 RX ADMIN — KETOROLAC TROMETHAMINE 15 MG: 30 INJECTION, SOLUTION INTRAMUSCULAR; INTRAVENOUS at 11:48

## 2020-10-12 RX ADMIN — HYDROCODONE BITARTRATE AND ACETAMINOPHEN 1 TABLET: 5; 325 TABLET ORAL at 13:00

## 2020-10-12 RX ADMIN — MAGNESIUM SULFATE HEPTAHYDRATE 1 G: 1 INJECTION, SOLUTION INTRAVENOUS at 00:59

## 2020-10-12 RX ADMIN — KETOROLAC TROMETHAMINE 15 MG: 30 INJECTION, SOLUTION INTRAMUSCULAR at 11:48

## 2020-10-12 RX ADMIN — POTASSIUM CHLORIDE, DEXTROSE MONOHYDRATE AND SODIUM CHLORIDE: 150; 5; 450 INJECTION, SOLUTION INTRAVENOUS at 04:13

## 2020-10-12 RX ADMIN — HYDROCODONE BITARTRATE AND ACETAMINOPHEN 1 TABLET: 5; 325 TABLET ORAL at 20:11

## 2020-10-12 RX ADMIN — SODIUM CHLORIDE: 9 INJECTION, SOLUTION INTRAVENOUS at 01:02

## 2020-10-12 RX ADMIN — Medication 10 ML: at 20:53

## 2020-10-12 RX ADMIN — PANTOPRAZOLE SODIUM 40 MG: 40 TABLET, DELAYED RELEASE ORAL at 05:57

## 2020-10-12 RX ADMIN — POTASSIUM CHLORIDE 10 MEQ: 10 INJECTION, SOLUTION INTRAVENOUS at 01:05

## 2020-10-12 RX ADMIN — INSULIN GLARGINE 10 UNITS: 100 INJECTION, SOLUTION SUBCUTANEOUS at 10:30

## 2020-10-12 RX ADMIN — KETOROLAC TROMETHAMINE 15 MG: 30 INJECTION, SOLUTION INTRAMUSCULAR; INTRAVENOUS at 17:47

## 2020-10-12 RX ADMIN — PANTOPRAZOLE SODIUM 40 MG: 40 TABLET, DELAYED RELEASE ORAL at 16:29

## 2020-10-12 RX ADMIN — MIRTAZAPINE 15 MG: 15 TABLET, FILM COATED ORAL at 20:12

## 2020-10-12 RX ADMIN — MORPHINE SULFATE 1 MG: 2 INJECTION, SOLUTION INTRAMUSCULAR; INTRAVENOUS at 09:13

## 2020-10-12 RX ADMIN — HYDROCODONE BITARTRATE AND ACETAMINOPHEN 1 TABLET: 5; 325 TABLET ORAL at 04:07

## 2020-10-12 RX ADMIN — INSULIN LISPRO 1 UNITS: 100 INJECTION, SOLUTION INTRAVENOUS; SUBCUTANEOUS at 16:32

## 2020-10-12 ASSESSMENT — PAIN SCALES - GENERAL
PAINLEVEL_OUTOF10: 10
PAINLEVEL_OUTOF10: 8
PAINLEVEL_OUTOF10: 0
PAINLEVEL_OUTOF10: 8
PAINLEVEL_OUTOF10: 9
PAINLEVEL_OUTOF10: 4
PAINLEVEL_OUTOF10: 3
PAINLEVEL_OUTOF10: 9
PAINLEVEL_OUTOF10: 8
PAINLEVEL_OUTOF10: 0
PAINLEVEL_OUTOF10: 9
PAINLEVEL_OUTOF10: 5

## 2020-10-12 ASSESSMENT — PAIN DESCRIPTION - ONSET
ONSET: ON-GOING
ONSET: ON-GOING

## 2020-10-12 ASSESSMENT — ENCOUNTER SYMPTOMS
BACK PAIN: 1
VOMITING: 0
NAUSEA: 0
SHORTNESS OF BREATH: 0
DIARRHEA: 0
ABDOMINAL PAIN: 1
COUGH: 0

## 2020-10-12 ASSESSMENT — PAIN DESCRIPTION - ORIENTATION
ORIENTATION: MID;LOWER
ORIENTATION: MID;LOWER
ORIENTATION: LOWER

## 2020-10-12 ASSESSMENT — PAIN DESCRIPTION - PAIN TYPE
TYPE: CHRONIC PAIN
TYPE: CHRONIC PAIN
TYPE: ACUTE PAIN
TYPE: CHRONIC PAIN

## 2020-10-12 ASSESSMENT — PAIN DESCRIPTION - DESCRIPTORS
DESCRIPTORS: ACHING;DISCOMFORT;DULL
DESCRIPTORS: ACHING;CONSTANT;DISCOMFORT
DESCRIPTORS: ACHING;CONSTANT;DISCOMFORT
DESCRIPTORS: ACHING;DISCOMFORT;SORE

## 2020-10-12 ASSESSMENT — PAIN DESCRIPTION - FREQUENCY
FREQUENCY: CONTINUOUS
FREQUENCY: CONTINUOUS

## 2020-10-12 ASSESSMENT — PAIN DESCRIPTION - LOCATION
LOCATION: BACK

## 2020-10-12 ASSESSMENT — PAIN - FUNCTIONAL ASSESSMENT: PAIN_FUNCTIONAL_ASSESSMENT: ACTIVITIES ARE NOT PREVENTED

## 2020-10-12 NOTE — ED NOTES
Report called to Carmelo Cabrera at [facility] ICU room [phone].  Pt with no distress, IV site benign      Jordan Salinas RN  10/12/20 5026

## 2020-10-12 NOTE — PROGRESS NOTES
Report called to Rafi RN on [address] [place] [address] for patient to be transferred to room 624. All pertinent information disclosed during nurse to nurse report, including that patient will be transferring out of ICU to [address] [place] [address] with jewelry and large quantity of cash. Patient previously notified of upcoming transfer to room 624. Placed on telemetry monitor. All patient needs met at this time. Awaiting transport.

## 2020-10-12 NOTE — PLAN OF CARE
Problem: Airway Clearance - Ineffective  Goal: Achieve or maintain patent airway  10/12/2020 1650 by Roldan Rodrigez RN  Outcome: Met This Shift  10/12/2020 0639 by Dominique Vasquez RN  Outcome: Met This Shift     Problem: Gas Exchange - Impaired  Goal: Absence of hypoxia  Outcome: Met This Shift     Problem: Breathing Pattern - Ineffective  Goal: Ability to achieve and maintain a regular respiratory rate  10/12/2020 1650 by Roldan Rodrigez RN  Outcome: Met This Shift  10/12/2020 0639 by Dominique Vasquez RN  Outcome: Met This Shift     Problem:  Body Temperature -  Risk of, Imbalanced  Goal: Ability to maintain a body temperature within defined limits  10/12/2020 1650 by Roldan Rodrigez RN  Outcome: Met This Shift  10/12/2020 0639 by Dominique Vasquez RN  Outcome: Met This Shift     Problem: Isolation Precautions - Risk of Spread of Infection  Goal: Prevent transmission of infection  10/12/2020 1650 by Roldan Rodrigez RN  Outcome: Met This Shift  10/12/2020 0639 by Dominique Vasquez RN  Outcome: Met This Shift     Problem: Nutrition Deficits  Goal: Optimize nutrtional status  Outcome: Met This Shift     Problem: Loneliness or Risk for Loneliness  Goal: Demonstrate positive use of time alone when socialization is not possible  Outcome: Met This Shift     Problem: Fatigue  Goal: Verbalize increase energy and improved vitality  10/12/2020 1650 by Roldan Rodrigez RN  Outcome: Met This Shift  10/12/2020 0639 by Dominique Vasquez RN  Outcome: Met This Shift     Problem: Patient Education: Go to Patient Education Activity  Goal: Patient/Family Education  Outcome: Met This Shift     Problem: Risk for Fluid Volume Deficit  Goal: Maintain normal heart rhythm  Outcome: Ongoing     Problem: Pain:  Goal: Pain level will decrease  Description: Pain level will decrease  Outcome: Ongoing  Goal: Control of chronic pain  Description: Control of chronic pain  Outcome: Ongoing

## 2020-10-12 NOTE — PROGRESS NOTES
ENDOCRINOLOGY PROGRESS NOTE  TELEMEDICINE SERVICE    Date of Service: 10/12/2020  Date of Admission: 10/12/2020  Admitting Physician: Alexander Cotter MD   Primary Care Physician: Ranjana Sandy DO  Consultant physician: Patrick Salazar MD     Reason for the consultation:  DM type 1 admitted with DKA     History of Present Illness: The history is provided by the patient. Accuracy of the patient data is excellent. Andrew Gloria is a 27 y.o. old female with PMH of DM type 1 admitted to [facility] on 10/8/2020 because of abdominal pain, back pain, nausea and vomiting, endocrine team was consulted for diabetes management. Up on arrival to ER, , AG 33, Beta-hydroxybutyrate <4.5  24 y. o. female with significant past medical history of type 1 diabetes.  Patient presented to the emergency department with abdominal pain, nausea and vomiting.  In the emergency department it was found that her blood glucose was elevated blood glucose of 662, anion gap of 33, beta hydroxybutyrate of greater than 4.5.  Patient reported back pain for about 5  months.  previous work up including MRI were negative. The patient denied cough, fever, chills, chest pain or SOB  COVID swab test was positive     Subjective:  Patient seen and examined, no overnight major events.  Remain on insulin gtt stated she felt hungry      Inpatient diet:   Carb Restricted diet     Point of care glucose monitoring:   Independently reviewed   Recent Labs     10/10/20  1532 10/10/20  1714 10/10/20  1906 10/11/20  2341 10/12/20  0104 10/12/20  0301 10/12/20  0434 10/12/20  0556   GLUMET 286* 293* 251* 336* 286* 200* 105* 137*       Scheduled Meds:   metoclopramide  10 mg Oral 4x Daily    pantoprazole  40 mg Oral BID AC    sodium chloride flush  10 mL Intravenous 2 times per day    enoxaparin  40 mg Subcutaneous Daily    sodium chloride  15 mL/kg Intravenous Once    insulin regular  0.1 Units/kg Intravenous Once    morphine  1 mg Intravenous Once     PRN Meds:   sodium chloride flush, 10 mL, PRN  acetaminophen, 650 mg, Q6H PRN    Or  acetaminophen, 650 mg, Q6H PRN  polyethylene glycol, 17 g, Daily PRN  promethazine, 12.5 mg, Q6H PRN    Or  ondansetron, 4 mg, Q6H PRN  dextrose, 12.5 g, PRN  potassium chloride, 10 mEq, PRN  magnesium sulfate, 1 g, PRN  sodium phosphate IVPB, 10 mmol, PRN    Or  sodium phosphate IVPB, 15 mmol, PRN    Or  sodium phosphate IVPB, 20 mmol, PRN  dextrose 5% and 0.45% NaCl with KCl 20 mEq, , Continuous PRN  HYDROcodone 5 mg - acetaminophen, 1 tablet, Q6H PRN  [START ON 10/14/2020] ketorolac, 15 mg, Q6H PRN      Continuous Infusions:   sodium chloride      dextrose 5% and 0.45% NaCl with KCl 20 mEq 150 mL/hr at 10/12/20 0413    insulin 0.45 Units/hr (10/12/20 0413)       Review of Systems  All systems reviewed. All negative except for symptoms mentioned in HPI     OBJECTIVE    BP (!) 149/99   Pulse 102   Temp 96.7 °F (35.9 °C) (Oral)   Resp 13   Ht 5' 1\" (1.549 m)   Wt 92 lb 6 oz (41.9 kg)   LMP  (LMP Unknown)   SpO2 100%   BMI 17.45 kg/m²     Intake/Output Summary (Last 24 hours) at 10/12/2020 0848  Last data filed at 10/12/2020 0500  Gross per 24 hour   Intake 213 ml   Output --   Net 213 ml       Physical examination:  General: awake alert, oriented x3, no abnormal position or movements. HEENT: normocephalic non-traumatic, no exophthalmos   Neck: supple, no LN enlargement, no thyromegaly, no thyroid tenderness, no JVD. Pulm: Clear equal air entry no added sounds, no wheezing or rhonchi    CVS: S1 + S2, no murmur, no heave  Abd: soft lax, no tenderness, no organomegaly, audible bowel sounds. Skin: warm, no lesions, no rash. Feet: sensory exam of the feet is present, tested with the monofilament. No ulcers or open wounds.  Good peripheral pulses  Neuro: CN intact, muscle power normal  Psych: normal mood, and affect    Review of Laboratory Data:  I have reviewed the following:  Recent Labs     10/10/20  1595 10/11/20  4359 WBC 5.3 6.0   RBC 3.85 4.13   HGB 10.7* 11.4*   HCT 32.1* 34.6   MCV 83.4 83.8   MCH 27.8 27.6   MCHC 33.3 32.9   RDW 13.1 12.8    226   MPV 11.2 11.3     Recent Labs     10/10/20  1520  10/10/20  1730 10/10/20  1801 10/11/20  2233   *  --  133  --  135   K 5.1*  --  4.1  --  4.2   CL 94*  --  96*  --  95*   CO2 25  --  27  --  13*   BUN 7  --  7  --  12   CREATININE 0.5  --  0.4*  --  0.5   GLUCOSE 332*   < > 323* 323 352*   CALCIUM 9.7  --  9.5  --  9.3   PROT  --   --   --   --  7.0   LABALBU  --   --   --   --  3.9   BILITOT  --   --   --   --  0.5   ALKPHOS  --   --   --   --  83   AST  --   --   --   --  21   ALT  --   --   --   --  19    < > = values in this interval not displayed.      Beta-Hydroxybutyrate   Date Value Ref Range Status   10/11/2020 >4.50 (H) 0.02 - 0.27 mmol/L Final   10/10/2020 2.22 (H) 0.02 - 0.27 mmol/L Final   10/08/2020 >4.50 (H) 0.02 - 0.27 mmol/L Final     Lab Results   Component Value Date    LABA1C 13.0 05/30/2020    LABA1C 15.9 03/09/2020    LABA1C 14.2 08/06/2019     No results found for: TSH, T4FREE, T5OQLBX, FT3, F2CVUVY, TSI, TPOABS, THGAB  Lab Results   Component Value Date    LABA1C 13.0 05/30/2020    GLUCOSE 352 10/11/2020    MALBCR 204.6 01/23/2018    LABMICR 102.3 01/23/2018    LABCREA 207 09/02/2020     Lab Results   Component Value Date    TRIG 106 01/24/2018    HDL 40 01/24/2018    LDLCALC 114 01/24/2018    CHOL 175 01/24/2018       Blood culture   Lab Results   Component Value Date    BC 24 Hours no growth 10/08/2020    BC 5 Days no growth 08/31/2020       Radiology:  XR CHEST PORTABLE    (Results Pending)       Medical Records/Labs/Images review:   I personally reviewed and summarized previous records   All labs and imaging were reviewed independently     Bob Olga, a 27 y.o.-old female seen in the hospital today for DM management    DM1 admitted with DKA   · Patient's DM is poorly controlled  due to poor compliance with diet

## 2020-10-12 NOTE — CARE COORDINATION
Downgraded to f LOC. Pt agreeable to short term rehab- requesting I call her grandmother [facility] [phone]. [facility] is in agreement w/ [facility]- COVID POSITIVE-she was notified  [facility] is the only [facility] in this area that is accepting COVID patients -she is in agreement w/ [facility]. Awaiting PT/OT evals- message sent to dept to complete needed evals for insurance. Sheridan @ [employer] notified of referral- await response after therapy evals are on chart. Niesha Herzog updated via phone.   Will follow Malvin Eddy

## 2020-10-12 NOTE — PLAN OF CARE
Problem: Airway Clearance - Ineffective  Goal: Achieve or maintain patent airway  Outcome: Met This Shift     Problem: Breathing Pattern - Ineffective  Goal: Ability to achieve and maintain a regular respiratory rate  Outcome: Met This Shift     Problem:  Body Temperature -  Risk of, Imbalanced  Goal: Ability to maintain a body temperature within defined limits  Outcome: Met This Shift     Problem: Isolation Precautions - Risk of Spread of Infection  Goal: Prevent transmission of infection  Outcome: Met This Shift     Problem: Fatigue  Goal: Verbalize increase energy and improved vitality  Outcome: Met This Shift

## 2020-10-12 NOTE — CONSULTS
PSYCHIATRY ATTENDING CONSULT    REASON FOR CONSULT:  Noncompliance and poor self care    REQUESTING PHYSICIAN:  Dr. Darcy Silva: \"Sometimes it's depression. \"    HISTORY OF PRESENT ILLNESS:  David Camacho is a 27 y.o. female who was admitted on 10/12/20 due to recurrent DKA. Chart reviewed. Patient also covid positive. Per notes she has been in and out of the hospital frequently due to noncompliance with diabetic management. Spoke with patient via telephone for consultation which she consents to. Patient location [address] [place] [address]. Provider location [facility]. Niesha Herzog was alert, oriented and provided fair history. She acknowledges outpatient noncompliance which she believes is multifactorial due to combination of feeling physically ill at times, intermittent depression and sometimes \"just being lazy\". We discussed the relationship of physical and emotional health which she does seem to grasp. Patient reports depression comes and goes but can last up to a week with symptoms of depressed mood, anhedonia, insomnia, anorexia and fleeting suicidal thoughts. Niesha Herzog denies any true suicidal ideations, intentions or plans and has no history of suicidal behavior. She is open to outpatient counseling as well as antidepressant medication. No history of jeanine or psychosis. PAST PSYCHIATRIC HISTORY:  No history of inpatient or outpatient treatment.     PAST MEDICAL HISTORY:       Diagnosis Date    Bleeding at insertion site 1/5/2018    Common femoral artery injury, right, initial encounter 1/5/2018    DM type 1 (diabetes mellitus, type 1) ([facility] [address]      PAST SURGICAL HISTORY:       Procedure Laterality Date    ABDOMEN SURGERY N/A 5/9/2019    INCISION AND DRAINAGE OF SUPRAPUBIC ABSESS performed by Carey De Jesus MD at [address] [place] [address]      last yr     MEDICATIONS: Current Facility-Administered Medications: metoclopramide (REGLAN) tablet 10 mg, 10 mg, Oral, 4x doing cosmetology at home. Patient lives with grandmother. No relationship at this time. No children. SUBSTANCE ABUSE HISTORY:  reports that she has never smoked. She has never used smokeless tobacco. She reports that she does not drink alcohol or use drugs. VITALS:   Vitals:    10/12/20 0600   BP: (!) 149/99   Pulse: 102   Resp: 13   Temp:    SpO2: 100%     LABS:   Admission on 10/12/2020   Component Date Value Ref Range Status    WBC 10/12/2020 5.4  4.5 - 11.5 E9/L Final    RBC 10/12/2020 3.71  3.50 - 5.50 E12/L Final    Hemoglobin 10/12/2020 10.3* 11.5 - 15.5 g/dL Final    Hematocrit 10/12/2020 30.7* 34.0 - 48.0 % Final    MCV 10/12/2020 82.7  80.0 - 99.9 fL Final    MCH 10/12/2020 27.8  26.0 - 35.0 pg Final    MCHC 10/12/2020 33.6  32.0 - 34.5 % Final    RDW 10/12/2020 12.7  11.5 - 15.0 fL Final    Platelets 50/49/2621 216  130 - 450 E9/L Final    MPV 10/12/2020 10.3  7.0 - 12.0 fL Final    Sodium 10/12/2020 134  132 - 146 mmol/L Final    Potassium 10/12/2020 3.9  3.5 - 5.0 mmol/L Final    Chloride 10/12/2020 105  98 - 107 mmol/L Final    CO2 10/12/2020 20* 22 - 29 mmol/L Final    Anion Gap 10/12/2020 9  7 - 16 mmol/L Final    Glucose 10/12/2020 224* 74 - 99 mg/dL Final    BUN 10/12/2020 6  6 - 20 mg/dL Final    CREATININE 10/12/2020 0.4* 0.5 - 1.0 mg/dL Final    GFR Non- 10/12/2020 >60  >=60 mL/min/1.73 Final    Comment: Chronic Kidney Disease: less than 60 ml/min/1.73 sq.m. Kidney Failure: less than 15 ml/min/1.73 sq.m. Results valid for patients 18 years and older.       GFR  10/12/2020 >60   Final    Calcium 10/12/2020 9.0  8.6 - 10.2 mg/dL Final    Meter Glucose 10/12/2020 200* 74 - 99 mg/dL Final    Meter Glucose 10/12/2020 105* 74 - 99 mg/dL Final    Meter Glucose 10/12/2020 137* 74 - 99 mg/dL Final    Meter Glucose 10/12/2020 218* 74 - 99 mg/dL Final   Admission on 10/11/2020, Discharged on 10/12/2020   Component Date Value Ref Range Status    WBC 10/11/2020 6.0  4.5 - 11.5 E9/L Final    RBC 10/11/2020 4.13  3.50 - 5.50 E12/L Final    Hemoglobin 10/11/2020 11.4* 11.5 - 15.5 g/dL Final    Hematocrit 10/11/2020 34.6  34.0 - 48.0 % Final    MCV 10/11/2020 83.8  80.0 - 99.9 fL Final    MCH 10/11/2020 27.6  26.0 - 35.0 pg Final    MCHC 10/11/2020 32.9  32.0 - 34.5 % Final    RDW 10/11/2020 12.8  11.5 - 15.0 fL Final    Platelets 51/37/4761 226  130 - 450 E9/L Final    MPV 10/11/2020 11.3  7.0 - 12.0 fL Final    Neutrophils % 10/11/2020 57.1  43.0 - 80.0 % Final    Immature Granulocytes % 10/11/2020 0.3  0.0 - 5.0 % Final    Lymphocytes % 10/11/2020 35.9  20.0 - 42.0 % Final    Monocytes % 10/11/2020 6.0  2.0 - 12.0 % Final    Eosinophils % 10/11/2020 0.2  0.0 - 6.0 % Final    Basophils % 10/11/2020 0.5  0.0 - 2.0 % Final    Neutrophils Absolute 10/11/2020 3.42  1.80 - 7.30 E9/L Final    Immature Granulocytes # 10/11/2020 0.02  E9/L Final    Lymphocytes Absolute 10/11/2020 2.15  1.50 - 4.00 E9/L Final    Monocytes Absolute 10/11/2020 0.36  0.10 - 0.95 E9/L Final    Eosinophils Absolute 10/11/2020 0.01* 0.05 - 0.50 E9/L Final    Basophils Absolute 10/11/2020 0.03  0.00 - 0.20 E9/L Final    Sodium 10/11/2020 135  132 - 146 mmol/L Final    Potassium 10/11/2020 4.2  3.5 - 5.0 mmol/L Final    Chloride 10/11/2020 95* 98 - 107 mmol/L Final    CO2 10/11/2020 13* 22 - 29 mmol/L Final    Anion Gap 10/11/2020 27* 7 - 16 mmol/L Final    Glucose 10/11/2020 352* 74 - 99 mg/dL Final    BUN 10/11/2020 12  6 - 20 mg/dL Final    CREATININE 10/11/2020 0.5  0.5 - 1.0 mg/dL Final    GFR Non- 10/11/2020 >60  >=60 mL/min/1.73 Final    Comment: Chronic Kidney Disease: less than 60 ml/min/1.73 sq.m. Kidney Failure: less than 15 ml/min/1.73 sq.m. Results valid for patients 18 years and older.       GFR  10/11/2020 >60   Final    Calcium 10/11/2020 9.3  8.6 - 10.2 mg/dL Final    Total Protein 10/11/2020 7.0  6.4 - 8.3 g/dL Final    Alb 10/11/2020 3.9  3.5 - 5.2 g/dL Final    Total Bilirubin 10/11/2020 0.5  0.0 - 1.2 mg/dL Final    Alkaline Phosphatase 10/11/2020 83  35 - 104 U/L Final    ALT 10/11/2020 19  0 - 32 U/L Final    AST 10/11/2020 21  0 - 31 U/L Final    Ventricular Rate 10/11/2020 112  BPM Incomplete    Atrial Rate 10/11/2020 112  BPM Incomplete    P-R Interval 10/11/2020 132  ms Incomplete    QRS Duration 10/11/2020 64  ms Incomplete    Q-T Interval 10/11/2020 330  ms Incomplete    QTc Calculation (Bazett) 10/11/2020 450  ms Incomplete    P Axis 10/11/2020 73  degrees Incomplete    R Axis 10/11/2020 55  degrees Incomplete    T Axis 10/11/2020 71  degrees Incomplete    Troponin 10/11/2020 <0.01  0.00 - 0.03 ng/mL Final    Comment: TROPONIN T BLOOD LEVELS:         0.03 ng/mL     Upper Reference Limit  0.04 - 0.09 ng/mL     Possible myocardial injury      >= 0.10 ng/mL     Myocardial injury      Lipase 10/11/2020 7* 13 - 60 U/L Final    Lactic Acid 10/11/2020 1.1  0.5 - 2.2 mmol/L Final    D-Dimer, Quant 10/11/2020 <200  ng/mL DDU Final    Comment: D-DIMER Interpretation:  <  230  ng/mL (D-DU)  Indicates low probability for PE/DVT   = 232  ng/mL (D-DU)  Upper Limit of Normal  >= 4000 ng/mL (D-DU)  This level could suggest the presence                        of DIC. Clinical correlation may be                        helpful.  pH, Christoph 10/11/2020 7.26* 7.35 - 7.45 Final    Beta-Hydroxybutyrate 10/11/2020 >4.50* 0.02 - 0.27 mmol/L Final    Meter Glucose 10/11/2020 336* 74 - 99 mg/dL Final    Magnesium 10/11/2020 1.6  1.6 - 2.6 mg/dL Final    Phosphorus 10/11/2020 4.8* 2.5 - 4.5 mg/dL Final    Meter Glucose 10/12/2020 286* 74 - 99 mg/dL Final         MENTAL STATUS EXAMINATION  Female. Pleasant, cooperative, forthcoming. Mood depressed. Speech clear. Thought process organized without loosening of associations. Content future-oriented.  No active suicidal or homicidal ideations. No paranoia, delusions or hallucinations. Orientation, concentration, recent and remote memory are grossly intact. Fund of knowledge fair. Language use fair. Insight and judgment limited to fair. ASSESSMENT:  Depressive Disorder    PLAN & RECOMMENDATIONS: Start Remeron for mood, sleep and appetite stimulation. Encouraged outpatient counseling. No indication for psychiatric admission. OK to discharge from my standpoint - given recurring issues and reported body weight of just 88 pounds I do wonder if she would benefit from a short rehab stay.      Total time spent 20 min    Carla Lindquist M.D. 10/12/2020 10:15 AM

## 2020-10-12 NOTE — PATIENT CARE CONFERENCE
Intensive Care Daily Quality Rounding Checklist      ICU Team Members:     ICU Day #: 1    Intubation Date:  n/a    Ventilator Day #:     Central Line Insertion Date:  10/12/2        Day #: 1     Arterial Line Insertion Date:  n/a      Day #:     DVT Prophylaxis: lovenox    GI Prophylaxis: protonix    Bey Catheter Insertion Date:  n/a       Day #:       Continued need (if yes, reason documented and discussed with physician):     Skin Issues/ Wounds and ordered treatment discussed on rounds:     Goals/ Plans for the Day: monitor labs, obtain line for blood draws, replace e- and replace as needed, monitor blood sugars and start diet when gap closed for two bmp's, consult psych and social work for noncompliance and self neglect may need ecf placement if possible

## 2020-10-12 NOTE — PROGRESS NOTES
Physical Therapy    Facility/Department: [facility] MED SURG  Initial Assessment    NAME: Arielle Moreno  : 1995  MRN: [id number]    Date of Service: 10/12/2020       REQUIRES PT FOLLOW UP: Yes       Patient Diagnosis(es): There were no encounter diagnoses. has a past medical history of Bleeding at insertion site, Common femoral artery injury, right, initial encounter, Depressive disorder, and DM type 1 (diabetes mellitus, type 1) ([facility] [address] has a past surgical history that includes Abdomen surgery (N/A, 2019) and Bartholin gland cyst excision. Evaluating Therapist: Sharon Arechiga, PT     Referring Provider:  Iban Rojo MD    Room #: 782   DIAGNOSIS:  COVID   Additional Pertinent History: DM   PRECAUTIONS:  Falls, droplet plus     Social:  Pt lives with  Grandmother  in a  1 floor plan  No  steps   to enter. Prior to admission pt walked with no AD, independent      Initial Evaluation  Date: 10/12/2020 Treatment      Short Term/ Long Term   Goals   Was pt agreeable to Eval/treatment? yes      Does pt have pain? R hip pain      Bed Mobility  Rolling:  NT   Supine to sit:  NT   Sit to supine:  NT   Scooting:  Independent in sit   Independent    Transfers Sit to stand:  SBA   Stand to sit:  SBA   Stand pivot:  NT    independent    Ambulation    20  feet with  HHA  with  CGA    150  feet with  No AD independent        Stair negotiation: ascended and descended NT    4  steps with  1  rail with  Independent    LE ROM  AAROM WFL      LE strength  R LE 3- to 3+/ 5   L LE 3+ to 4-/ 5      AM- PAC RAW score  18/ 24            Pt is alert and Oriented x  3      Balance: CGA, fall risk due to weakness   Endurance: decreased   Bed/Chair alarm:  No      ASSESSMENT  Pt displays functional ability as noted in the objective portion of this evaluation. Treatment/Education:    Mobility as above,. Pt refused to walk further.  Poor activity tolerance     Pt educated on fall risk,  PT POC, safe and proper

## 2020-10-12 NOTE — PROGRESS NOTES
Pt seen and examined by night physician who admitted pt earlier today  Chart reviewed and updated by nursing

## 2020-10-12 NOTE — HOME CARE
Current with [facility] for SN. Will need NAYELI orders prior to discharge if appropriate. Deanna Kennedy LPN, [facility].

## 2020-10-12 NOTE — H&P
[facility] History and Physical      CHIEF COMPLAINT:   Weakness     History of Present Illness:      27 M with DM on insulin pump presents with weakness and had a fall a few days ago . Pain worse at right hip with motion, . She tested positive for covid 19 on 10-8, and denies fever or dyspnea at this time. She is not hypoxic. Pertinent labs at [facility] ER found her to be in DKA and in need of higher level of care. She is now being admitted to icu. Anion gap and glucose improving . She has abdominal pain    Tested positive for covid 19 on 10-8-20    Informant(s) for H&P: patient     REVIEW OF SYSTEMS:  A comprehensive review of systems was negative except for: what is in the HPI       PMH:  Past Medical History:   Diagnosis Date    Bleeding at insertion site 1/5/2018    Common femoral artery injury, right, initial encounter 1/5/2018    DM type 1 (diabetes mellitus, type 1) ([facility] [address]        Surgical History:  Past Surgical History:   Procedure Laterality Date    ABDOMEN SURGERY N/A 5/9/2019    INCISION AND DRAINAGE OF SUPRAPUBIC ABSESS performed by Sherry Hernández MD at [address] [place] [address]      last yr       Medications Prior to Admission:    Prior to Admission medications    Medication Sig Start Date End Date Taking? Authorizing Provider   ketorolac (TORADOL) 10 MG tablet Take 1 tablet by mouth every 6 hours as needed for Pain 10/10/20 10/14/20  Ashley Lynch MD   naproxen (NAPROSYN) 250 MG tablet Take 1 tablet by mouth 2 times daily (with meals) 10/7/20   Zahida Gutierrez DO   metoclopramide (REGLAN) 10 MG tablet Take 1 tablet by mouth 4 times daily 10/7/20   Lefty Polanco DO   blood glucose test strips (CONTOUR NEXT TEST) strip Neelima Contour test strips.  Checks 4 times/day before meals and at bedtime and as needed for symptoms of irregular blood glucose 9/23/20   Marva Jasso MD   pantoprazole (PROTONIX) 40 MG tablet Take 1 tablet by mouth 2 times daily (before meals) 9/4/20 10/4/20  Beckie Ritter MD   insulin aspart (NOVOLOG) 100 UNIT/ML injection vial Inject into the skin 3 times daily (before meals) PER PUMP    Historical Provider, MD   blood glucose test strips (CONTOUR NEXT TEST) strip Neelima Contour test strips. Checks 4 times/day before meals and at bedtime and as needed for symptoms of irregular blood glucose 8/12/20   Jose Blount MD   doxepin (ZONALON) 5 % cream APPLY ONE GRAM (ONE PUMP) EXTERNALLY THREE TIMES A DAY TO PAIN AREA TO LOWER BACK  8/10/20   Blanchie KENA Baldwin   [facility]) GEL APPLY ONE GRAM (ONE PUMP) EXTERNALLY FOUR TIMES A DAY TO LOWER BACK  8/10/20   Blanchie Pile, PA   Insulin Pump - Insulin regular Inject 1 Units/hr into the skin continuous Insulin-to-Carb Ratio (ICR): Insulin Sensitivity Factor (ISF): mg/dL per unit of insulin  Target Blood Glucose: mg/dL  Bolus Frequency:        Pt's dosing ranges, pt follows with Dr. Maddy Arevalo Provider, MD   acetone, urine, test strip Use daily as directed if bs >250 x2 or illness 5/7/20   Raghavendra Gaytan APRN - CNS       Allergies:    Patient has no known allergies. Social History:    reports that she has never smoked. She has never used smokeless tobacco. She reports that she does not drink alcohol or use drugs. Family History:   family history includes Diabetes in her maternal grandmother and paternal grandmother; Stroke in an other family member.        PHYSICAL EXAM:  Vitals:  BP (!) 131/90   Pulse 105   Temp 96.5 °F (35.8 °C) (Axillary)   Resp 13   Wt 92 lb 6 oz (41.9 kg)   LMP  (LMP Unknown)   SpO2 100%   BMI 17.45 kg/m²      General Appearance: alert and oriented to person, place and time and in no acute distress  Skin: warm and dry  Head: normocephalic and atraumatic  Eyes: pupils equal, round, and reactive to light, extraocular eye movements intact, conjunctivae normal  Neck: neck supple and non tender without mass Pulmonary/Chest: clear to auscultation bilaterally- no wheezes, rales or rhonchi, normal air movement, no respiratory distress  Cardiovascular: normal rate, normal S1 and S2 and no carotid bruits  Abdomen: soft, non-tender, non-distended, normal bowel sounds   Extremities: no cyanosis, no clubbing and no edema  Neurologic: no cranial nerve deficit and speech normal        LABS:  Recent Labs     10/10/20  1520  10/10/20  1730 10/10/20  1801 10/11/20  2233   *  --  133  --  135   K 5.1*  --  4.1  --  4.2   CL 94*  --  96*  --  95*   CO2 25  --  27  --  13*   BUN 7  --  7  --  12   CREATININE 0.5  --  0.4*  --  0.5   GLUCOSE 332*   < > 323* 323 352*   CALCIUM 9.7  --  9.5  --  9.3    < > = values in this interval not displayed. Recent Labs     10/09/20  0345 10/10/20  1628 10/11/20  2233   WBC 14.9* 5.3 6.0   RBC 3.34* 3.85 4.13   HGB 9.3* 10.7* 11.4*   HCT 27.8* 32.1* 34.6   MCV 83.2 83.4 83.8   MCH 27.8 27.8 27.6   MCHC 33.5 33.3 32.9   RDW 13.2 13.1 12.8    240 226   MPV 10.8 11.2 11.3       No results for input(s): POCGLU in the last 72 hours. Radiology:     CT HEAD WO CONTRAST   Final Result   No acute intracranial hemorrhage. No cervical fracture or subluxation.           CT CERVICAL SPINE WO CONTRAST   Final Result   No acute intracranial hemorrhage. No cervical fracture or subluxation.           XR CHEST PORTABLE   Final Result   No acute process.           XR HIP RIGHT (2-3 VIEWS)   Final Result   No fracture.           XR LUMBAR SPINE (MIN 4 VIEWS)   Final Result   No fracture.           XR THORACIC SPINE (3 VIEWS)   Final Result   No acute abnormality of the thoracic spine       EKG: This EKG is signed and interpreted by me. Rate: 112  Rhythm: sinus tachycardia  Interpretation: non-specific EKG  Comparison: compared to previous    ASSESSMENT:      Active Problems:    DKA, type 1, not at goal [facility])  Resolved Problems:    * No resolved hospital problems.  *   Diabetic ketoacidosis   High anion gap metabolic acidosis   [id number] virus infection   Neck sprain, initial encounter   Contusion of right hip, initial encounter   Abdominal pain , possibly related to DKA or gastroparesis          PLAN:    Admit to micu   Hold insulin pump for now  Give insulin iv bolus followed by insulin drip  NS bolus, ns maintenance fluids , then change to  D5 1/2 NS with kcl per protocol   Carb control diet   Monitor poct glucose q1h for now   Monitor I/O and weights   Monitor, bmp, Mg, Phos, recheck vbg with next set of labs   Consult critical care physician      Code Status: full  DVT prophylaxis: enoxaparin    Critical care time spent 35 minutes      Electronically signed by Ezequiel Greene MD on 10/12/2020 at 3:07 AM

## 2020-10-12 NOTE — ED NOTES
Bed: 08  Expected date:   Expected time:   Means of arrival:   Comments:  PAGE Williamson  10/11/20 7093

## 2020-10-12 NOTE — PROGRESS NOTES
.Patient admitted from [facility] ED to ICU room 204, with the following belongings purse, cell phone, $2,600 dollars all in $20 and one $100 bill, silver necklace, two sets of silver in color earrings, insulin pump, lighter,clothes, slippers, jacket, credit cards, placed on monitor, patient oriented to room and unit visiting hours. Patient guide at bedside, reviewed patient rights and responsibilities. MRSA nasal swab obtained. Bed alarm on. Call light within reach.

## 2020-10-12 NOTE — CONSULTS
ENDOCRINOLOGY INITIAL CONSULTATION NOTE VIA TELEMEDICINE SERVICE       Date of admission: 10/12/2020  Date of service: 10/12/2020  Admitting physician: Tracie Panda MD   Primary Care Physician: Allyson Manzano DO  Consultant physician: Dewitte Alpers MD       Reason for the consultation:  DM type 1 admitted with DKA     History of Present Illness:  Services were provided through a video synchronous discussion     Hortensia Ayala is a 27 y.o. old female with PMH of DM type 1 admitted to [facility] on 10/12/2020 because of nausea and vomiting and found to be in DKA, endocrine team was consulted for diabetes management. The patient has been in and out of the hospital frequently with DKA due to noncompliance with diabetic management  Patient also c/o back pain for which has been going on for almost 5  months. previous work up including MRI were negative. The patient denied cough, fever, chills, chest pain or SOB    Up on arrival to ER, , AG 27, Bicarb 13, K 4.2, Cr 0.5    COVID swab test was positive     Prior to admission  Type 1 DM was diagnosed at the age 8. Prior to admission, patient started using insulin pump in 8/2020. She is currently on 670g Medtronic insulin pump with following settings: Basal rate 12a 0.8, CR 14, ISF 53, goal 12a 110-130, 7a 100-120, 9p 110-130, active 3 hrs. Patient has been eating consistent carbohydrate meals, she checks BS 2-3 times a day and self-blood glucose monitoring has been highly variable prior to admission. She is due for annual eye exam but denied any history of diabetic retinopathy.   The patient performs her own feet care and doesn't see podiatry service  Microvascular complications:  No Retinopathy, Nephropathy + mild Neuropathy   Macrovascular complications: no CAD, PVD, or Stroke    Lab Results   Component Value Date    LABA1C 13.0 05/30/2020       After admission   Currently on insulin drip as per DKA protocol     Inpatient diet:   Carb Restricted diet     Point of care glucose monitoring (Independently reviewed)   Recent Labs     10/12/20  0104 10/12/20  0301 10/12/20  0434 10/12/20  0556 10/12/20  0910 10/12/20  1026 10/12/20  1140 10/12/20  1631   GLUMET 286* 200* 105* 137* 218* 176* 129* 187*       Past medical history:   Past Medical History:   Diagnosis Date    Bleeding at insertion site 1/5/2018    Common femoral artery injury, right, initial encounter 1/5/2018    Depressive disorder     DM type 1 (diabetes mellitus, type 1) ([facility] [address]        Past surgical history:  Past Surgical History:   Procedure Laterality Date    ABDOMEN SURGERY N/A 5/9/2019    INCISION AND DRAINAGE OF SUPRAPUBIC ABSESS performed by Aaron Portillo MD at [address] [place] [address]      last yr       Social history:   Tobacco:   reports that she has never smoked. She has never used smokeless tobacco.  Alcohol:   reports no history of alcohol use. Drugs:   reports no history of drug use. Family history:    Family History   Problem Relation Age of Onset    Diabetes Maternal Grandmother     Diabetes Paternal Grandmother     Stroke Other     Thyroid Disease Neg Hx        Allergy and drug reactions:   No Known Allergies    Scheduled Meds:   metoclopramide  10 mg Oral 4x Daily    pantoprazole  40 mg Oral BID AC    sodium chloride flush  10 mL Intravenous 2 times per day    enoxaparin  40 mg Subcutaneous Daily    insulin lispro  0-6 Units Subcutaneous TID WC    insulin lispro  0-3 Units Subcutaneous Nightly    mirtazapine  15 mg Oral Nightly     PRN Meds:   sodium chloride flush, 10 mL, PRN  acetaminophen, 650 mg, Q6H PRN    Or  acetaminophen, 650 mg, Q6H PRN  polyethylene glycol, 17 g, Daily PRN  promethazine, 12.5 mg, Q6H PRN    Or  ondansetron, 4 mg, Q6H PRN  dextrose, 12.5 g, PRN  HYDROcodone 5 mg - acetaminophen, 1 tablet, Q6H PRN  ketorolac, 15 mg, Q6H PRN      Continuous Infusions:      Review of Systems  All systems reviewed.  All negative except for symptoms mentioned in HPI     OBJECTIVE    BP (!) 147/106   Pulse 102   Temp 97.8 °F (36.6 °C) (Oral)   Resp 18   Ht 5' 1\" (1.549 m)   Wt 92 lb 6 oz (41.9 kg)   LMP  (LMP Unknown)   SpO2 100%   BMI 17.45 kg/m²     Intake/Output Summary (Last 24 hours) at 10/12/2020 1910  Last data filed at 10/12/2020 1300  Gross per 24 hour   Intake 1376.56 ml   Output --   Net 1376.56 ml       Physical examination:  Due to this being a TeleHealth encounter, evaluation of the following organ systems is limited:   EENT/Resp/CV/GI//MS/Neuro/Skin/Heme-Lymph-Imm.     General: awake alert, oriented x3, still feels sick   Pulm: move with respiration   Skin: no rash  Psych: normal mood, and affect    Review of Laboratory Data:  I personally reviewed the following labs:   Recent Labs     10/10/20  1628 10/11/20  2233 10/12/20  0925   WBC 5.3 6.0 5.4   RBC 3.85 4.13 3.71   HGB 10.7* 11.4* 10.3*   HCT 32.1* 34.6 30.7*   MCV 83.4 83.8 82.7   MCH 27.8 27.6 27.8   MCHC 33.3 32.9 33.6   RDW 13.1 12.8 12.7    226 216   MPV 11.2 11.3 10.3     Recent Labs     10/10/20  1730 10/10/20  1801 10/11/20  2233 10/12/20  0925     --  135 134   K 4.1  --  4.2 3.9   CL 96*  --  95* 105   CO2 27  --  13* 20*   BUN 7  --  12 6   CREATININE 0.4*  --  0.5 0.4*   GLUCOSE 323* 323 352* 224*   CALCIUM 9.5  --  9.3 9.0   PROT  --   --  7.0  --    LABALBU  --   --  3.9  --    BILITOT  --   --  0.5  --    ALKPHOS  --   --  83  --    AST  --   --  21  --    ALT  --   --  19  --      Beta-Hydroxybutyrate   Date Value Ref Range Status   10/11/2020 >4.50 (H) 0.02 - 0.27 mmol/L Final   10/10/2020 2.22 (H) 0.02 - 0.27 mmol/L Final   10/08/2020 >4.50 (H) 0.02 - 0.27 mmol/L Final     Lab Results   Component Value Date    LABA1C 13.0 05/30/2020    LABA1C 15.9 03/09/2020    LABA1C 14.2 08/06/2019     No results found for: TSH, T4FREE, A8FMZDI, FT3, U7CUZFR, TSI, TPOABS, THGAB  Lab Results   Component Value Date    LABA1C 13.0 05/30/2020    GLUCOSE 224 10/12/2020 MALBCR 204.6 01/23/2018    LABMICR 102.3 01/23/2018    LABCREA 207 09/02/2020     Lab Results   Component Value Date    TRIG 106 01/24/2018    HDL 40 01/24/2018    LDLCALC 114 01/24/2018    CHOL 175 01/24/2018       Blood culture   Lab Results   Component Value Date    BC 24 Hours no growth 10/08/2020    BC 5 Days no growth 08/31/2020       Radiology:  XR CHEST PORTABLE   Final Result   1. Right internal jugular vein central venous line placement with the tip at   the cavoatrial junction and no immediate complications. 2. No active pulmonary disease. Medical Records/Labs/Images review:   I personally reviewed and summarized previous records   All labs and imaging were reviewed independently     Bob Espinoza, a 27 y.o.-old female seen today for inpatient diabetes management     DM1 admitted with DKA   · Patient's DM is poorly controlled  due to poor compliance with diet and BS checking   · Transition to SQ insulin this am   · Plan to restart insulin tomorrow morning if she feels better   · For now  · Continue Lantus 10 units daily  · Low dose sliding scale   · Once start eating, will add small dose of prandial meal coverage     · Glucose check before meals and at bed time   · Will titrate insulin dose based on blood glucose trend & insulin requirement    COVID positive  · Pt not on steroids now  · Managed by primary team     The above issues were reviewed with the patient who understood and agreed with the plan. Thank you for allowing us to participate in the care of this patient. Please do not hesitate to contact us with any additional questions. Juanita Hinkle MD  Endocrinologist, [facility] Diabetes Care and Endocrinology   [address] [address]   Phone: [phone]  Fax: [phone]  --------------------------------------------  An electronic signature was used to authenticate this note.  Taty Carter MD on 10/12/2020 at 7:10 PM  This visit was performed

## 2020-10-12 NOTE — PROGRESS NOTES
Occupational Therapy  OCCUPATIONAL THERAPY INITIAL EVALUATION      Date:10/12/2020  Patient Name: Lisa Nguyen  MRN: [id number]  : 1995  Room: [address] [place], [place] [address]    Referring Provider: Monique Mcneill MD   Evaluating OT: Tracie Conde. Nedra, OTR/L - [id number]    AM-PAC Daily Activity Raw Score: 18/24      Recommended Adaptive Equipment: Continue to assess. Diagnosis: DKA, type 1, not at goal [facility]) [E10.10]     Pertinent Medical History: DM type 1; depressive disorder      Precautions: falls, droplet plus isolation    Home Living: Patient lives with her grandmother in a one-floor home; patient's bedroom and bathroom are on the main living level. Patient noted that they take their laundry to a laundromat. Bathroom Setup: tub shower (no grab bars, no seat) on main living level  Equipment Owned: N/A    Prior Level of Function (PLOF): Per patient, she was independent with ADLs, needed assistance with IADLs, and independent with functional mobility (without device) prior to this hospitalization. Patient reported that \"[she] knows what [she has] to do\" to properly manage her blood sugars at home, but she \"just [doesn't] do it\". Patient reported that her grandmother is primarily responsible for completion of IADLs. Driving: Yes, but only when she is \"not sick\"  Occupation: Patient reported that she is a .    Pain Level: Patient reported experiencing a headache and pain in her back and R hip; patient did not rate her pain. Patient stated that her nurse was aware of her complaints. Cognition: Patient alert and oriented x3. WFL command follow demonstrated.     Memory: WFL   Sequencing: WFL   Problem Solving: Fair+   Judgement/Safety: Fair    Functional Assessment:   Initial Eval Status  Date: 10/12/2020 Treatment Status  Date:  Short Term Goals   Feeding Independent     Grooming CGA  Mod I / Independent  (seated/standing at sink)   UB Dressing Setup  Independent   LB Dressing Mod A  Patient unable to reach to her R foot to adjust sock secondary to R hip pain. Mod I / Independent - with use of AE, as needed/appropriate   Bathing Min A  Mod I / Independent - with use of AE/DME, as needed/appropriate   Toileting CGA  Mod I / Independent   Bed Mobility  Supine-to-Sit: Supervision  Sit-to-Supine: Supervision      Functional Transfers Sit-to-Stand: SBA   from EOB  Independent   Functional Mobility CGA   (without device) within patient's room; slow pace demonstrated, which patient attributed to R hip pain. Independent / Mod I with functional mobility (with device, as needed/appropriate) in order to maximize independence with ADLs/IADLs and other functional tasks. Balance Sitting: Good  (at EOB)  Standing: Fair  (without device)  Good dynamic standing balance during completion of ADLs/IADLs and other functional tasks. Activity Tolerance Fair  Patient will demonstrate Good understanding and consistent implementation of energy conservation techniques and work simplification techniques into ADL/IADL routines. Visual/  Perceptual WFL  Patient reported that she occasionally experiences blurred vision. N/A   B UE Strength 3+/5 grossly  Patient will demonstrate 4/5 B UE strength in order to maximize independence with ADLs/IADLs and functional transfers. Long-Term Goal: Patient will increase functional independence to PLOF in order to allow patient to live in least restrictive environment. ROM: Additional Information:    R UE  WFL    L UE WFL      Hearing: WFL  Sensation: Patient denied experiencing numbness/tingling in B UEs. Tone: WFL  Edema: No    Comments: RN approved patient's participation in [address] [place] [address] activities. Upon arrival, patient supine in bed. At end of session, patient supine in bed (per patient preference) with call light and phone within reach and all lines and tubes intact.  Patient would benefit from continued skilled OT to increase safety and independence with completion of ADL/IADL tasks for functional independence and quality of life. Patient reported recently falling in the bathroom at home. Patient stated that her grandmother assists her with functional mobility, as needed, when she isn't feeling well. Treatment: OT treatment provided this date included:    Instruction/training on safety and adapted techniques for completion of ADLs - including potential benefits of DME use to maximize safety with ADLs in home environment.    Patient education provided regardin) importance of proper management of medications/blood sugars. Patient verbalized understanding. Further skilled OT treatment indicated to increase patient's safety and independence with completion of ADL/IADL tasks in order to maximize patient's functional independence and quality of life. Eval Complexity: Low    Assessment of Current Deficits:   Functional Mobility [x]  ADLs [x] Strength [x]  Cognition []  Functional Transfers  [x] IADLs [x] Safety Awareness [x]  Endurance [x]  Fine Motor Coordination [] Balance [x] Vision/Perception [] Sensation []   Gross Motor Coordination [] ROM [] Delirium []                  Motor Control []    Plan of Care:   OT treatment to be provided 1-3x/week for 5-7 days PRN to address the following:  [x] ADL Re-Training/AE Recommendations  [x] Energy Conservation Techniques/Strategies      [x] Neuromuscular Re-Education     [x] Functional Transfer Training         [x] Functional Mobility Training          [] Cognitive Re-Training         [] Splinting/Positioning Needs           [x] Therapeutic Activity   [x] Therapeutic Exercise  [] Visual/Perceptual   [] Delirium Prevention/Treatment   [x] Positioning to Improve Functional Tappen, Safety, and Skin Integrity   [x] Patient and/or Family Education to Increase Safety and Functional Tappen   [] Other:     Rehab Potential: Good for established goals.   Patient / Family Goal: Patient noted that she wants to go to rehab upon discharge from hospital.  Patient and/or family were instructed on functional diagnosis, prognosis/goals, and OT plan of care. Patient verbalized understanding. Low complexity evaluation + 10 timed treatment minutes  Time In: 1530  Time Out: 1550  Total Treatment Time: 10 minutes    Treatment Charges: Minutes: Units: Ther Ex  34125     Manual Therapy [id number]     Thera Activities 03100 10 1   ADL/Home Mgt [id number]     Neuro Re-ed 88148     Group Therapy      Orthotic manage/training  05074     Total Timed Treatment 10 1     Evaluation time includes thorough review of current medical information, gathering information on past medical history/social history and prior level of function, completion of standardized testing/informal observation of tasks, assessment of data, and development of POC/Goals. Callie Larsonus, OTR/L  License Number: [id number]

## 2020-10-12 NOTE — PROCEDURES
Central Line Placement Procedure Note    Indication: vascular access and need for frequent blood draws    Consent: The patient provided verbal consent for this procedure. Procedure: The patient was positioned appropriately and the skin over the right internal jugular vein was prepped with betadine and draped in a sterile fashion. Local anesthesia was obtained by infiltration using 1% Lidocaine without epinephrine. A large bore needle was used to identify the vein. A guide wire was then inserted into the vein through the needle. A triple lumen catheter was then inserted into the vessel over the guide wire using the Seldinger technique. All ports showed good, free flowing blood return and were flushed with saline solution. The catheter was then securely fastened to the skin with suture at 16 cm. Two sutures were placed into the proximal eyelets and a suture end from each of the securing sutures was extended around the catheter and tied to the proximal eyelets as an added measure to prevent dislodgement. An antibiotic disk was placed and the site was then covered with a sterile dressing. A post procedure X-ray was ordered and showed good line position. The patient tolerated the procedure well.     Complications: None

## 2020-10-12 NOTE — CONSULTS
Critical Care Admit/Consult Note         Patient - Kristine Johnson   MRN -  [id number]   [place] # - [de-identified]   - 1995      Date of Admission -  10/12/2020  2:38 AM  Date of evaluation -  10/12/2020  0204/0204-A   Hospital Day - 0            ADMIT/CONSULT DETAILS     Reason for Admit/Consult   DKA    Consulting Service/Physician   Consulting - Latia Roman DO  Primary Care Physician - DO TRINI Orellana   The patient is a 27 y.o. female with significant past medical history of type 1 diabetes, COVID-19, multiple occasions of DKA. Patient was recently discharged from the ICU on 10/9/2020. Patient presenting yesterday with weakness and complaint of vomiting and abdominal pain. She was found to be in DKA with blood glucose of 352, anion gap of 27, beta hydroxybutyrate of greater than 4.5. Uses an insulin pump at home.          Past Medical History         Diagnosis Date    Bleeding at insertion site 2018    Common femoral artery injury, right, initial encounter 2018    DM type 1 (diabetes mellitus, type 1) ([facility] [address]         Past Surgical History           Procedure Laterality Date    ABDOMEN SURGERY N/A 2019    INCISION AND DRAINAGE OF SUPRAPUBIC ABSESS performed by Alysia Blake MD at [facility]      last yr             Lines and Devices   Peripheral  Right IJ 10/12/20     Current Medications   Current Medications    metoclopramide  10 mg Oral 4x Daily    pantoprazole  40 mg Oral BID AC    sodium chloride flush  10 mL Intravenous 2 times per day    enoxaparin  40 mg Subcutaneous Daily    sodium chloride  15 mL/kg Intravenous Once    insulin regular  0.1 Units/kg Intravenous Once     sodium chloride flush, acetaminophen **OR** acetaminophen, polyethylene glycol, promethazine **OR** ondansetron, dextrose, potassium chloride, magnesium sulfate, sodium phosphate IVPB **OR** sodium phosphate IVPB **OR** sodium phosphate IVPB, dextrose 5% and 0.45% NaCl with KCl 20 mEq, HYDROcodone 5 mg - acetaminophen, [START ON 10/14/2020] ketorolac  IV Drips/Infusions   sodium chloride      dextrose 5% and 0.45% NaCl with KCl 20 mEq 150 mL/hr at 10/12/20 0413    insulin 3.16 Units/hr (10/12/20 0912)     Home Medications  Medications Prior to Admission: ketorolac (TORADOL) 10 MG tablet, Take 1 tablet by mouth every 6 hours as needed for Pain  naproxen (NAPROSYN) 250 MG tablet, Take 1 tablet by mouth 2 times daily (with meals)  metoclopramide (REGLAN) 10 MG tablet, Take 1 tablet by mouth 4 times daily  blood glucose test strips (CONTOUR NEXT TEST) strip, [employer] Contour test strips. Checks 4 times/day before meals and at bedtime and as needed for symptoms of irregular blood glucose  pantoprazole (PROTONIX) 40 MG tablet, Take 1 tablet by mouth 2 times daily (before meals)  insulin aspart (NOVOLOG) 100 UNIT/ML injection vial, Inject into the skin 3 times daily (before meals) PER PUMP  blood glucose test strips (CONTOUR NEXT TEST) strip, [employer] Contour test strips. Checks 4 times/day before meals and at bedtime and as needed for symptoms of irregular blood glucose  doxepin (ZONALON) 5 % cream, APPLY ONE GRAM (ONE PUMP) EXTERNALLY THREE TIMES A DAY TO PAIN AREA TO LOWER BACK   Gel Base (VERSAPRO) GEL, APPLY ONE GRAM (ONE PUMP) EXTERNALLY FOUR TIMES A DAY TO LOWER BACK   Insulin Pump - Insulin regular, Inject 1 Units/hr into the skin continuous Insulin-to-Carb Ratio (ICR): Insulin Sensitivity Factor (ISF): mg/dL per unit of insulin Target Blood Glucose: mg/dL Bolus Frequency:    Pt's dosing ranges, pt follows with Dr. Pili Cassidy   Novolog pump  acetone, urine, test strip, Use daily as directed if bs >250 x2 or illness    Diet/Nutrition   Diet NPO Effective Now    Allergies   Patient has no known allergies. Social History   Tobacco   reports that she has never smoked. She has never used smokeless tobacco.    Alcohol     reports no history of alcohol use.     Occupational history :    Family History         Problem Relation Age of Onset    Diabetes Maternal Grandmother     Diabetes Paternal Grandmother     Stroke Other     Thyroid Disease Neg Hx        ROS     REVIEW OF SYSTEMS:  Review of Systems   Constitutional: Positive for fatigue. HENT: Negative for congestion. Respiratory: Negative for cough and shortness of breath. Cardiovascular: Negative for chest pain. Gastrointestinal: Positive for abdominal pain. Negative for diarrhea, nausea and vomiting. Genitourinary: Negative for dysuria and hematuria. Musculoskeletal: Positive for back pain. Right hip pain     Skin: Negative for wound. Neurological: Negative for dizziness and headaches. Psychiatric/Behavioral: Negative for agitation. Mechanical Ventilation Data   VENT SETTINGS (Comprehensive)  Vent Information  SpO2: 100 %  Additional Respiratory  Assessments  Pulse: 102  Resp: 13  SpO2: 100 %    ABG  Lab Results   Component Value Date    PH 7.395 2020    PCO2 37.7 2020    PO2 50.7 2020    HCO3 22.6 2020    O2SAT 82.6 2020     Lab Results   Component Value Date    MODE RA 2020           Vitals    height is 5' 1\" (1.549 m) and weight is 92 lb 6 oz (41.9 kg). Her oral temperature is 96.7 °F (35.9 °C). Her blood pressure is 149/99 (abnormal) and her pulse is 102. Her respiration is 13 and oxygen saturation is 100%.        Temperature Range: Temp: 96.7 °F (35.9 °C) Temp  Av.2 °F (36.2 °C)  Min: 96.5 °F (35.8 °C)  Max: 98.3 °F (36.8 °C)  BP Range:  Systolic (86VRD), IRASEMA:263 , Min:126 , VTC:487     Diastolic (49NFU), PBF:30, Min:87, Max:102    Pulse Range: Pulse  Av  Min: 101  Max: 110  Respiration Range: Resp  Av.9  Min: 13  Max: 56  Current Pulse Ox[de-identified]  SpO2: 100 %  24HR Pulse Ox Range:  SpO2  Av.1 %  Min: 95 %  Max: 100 %  Oxygen Amount and Delivery:        I/O (24 Hours)    Patient Vitals for the past 8 hrs:   BP Temp Temp src Pulse Resp SpO2 Height Weight   10/12/20 0600 (!) 149/99 -- -- 102 13 100 % -- --   10/12/20 0500 (!) 140/100 -- -- 101 18 100 % -- --   10/12/20 0400 135/87 96.7 °F (35.9 °C) Oral 103 (!) 56 100 % -- --   10/12/20 0348 135/87 -- -- 107 -- -- -- --   10/12/20 0338 135/87 -- -- 106 -- -- -- --   10/12/20 0300 (!) 131/90 -- -- 108 24 100 % -- --   10/12/20 0253 (!) 131/90 96.5 °F (35.8 °C) Axillary 105 13 100 % 5' 1\" (1.549 m) 92 lb 6 oz (41.9 kg)       Intake/Output Summary (Last 24 hours) at 10/12/2020 1005  Last data filed at 10/12/2020 0500  Gross per 24 hour   Intake 213 ml   Output --   Net 213 ml     I/O last 3 completed shifts: In: 213 [I.V.:213]  Out: -    Date 10/12/20 0000 - 10/12/20 2359   Shift 3878-9432 2546-6070 7060-6222 24 Hour Total   INTAKE   I.V.(mL/kg) 213(5.1)   213(5.1)   Shift Total(mL/kg) 142(6.0)   213(5.1)   OUTPUT   Shift Total(mL/kg)       Weight (kg) 41.9 41.9 41.9 41.9     Patient Vitals for the past 96 hrs (Last 3 readings):   Weight   10/12/20 0253 92 lb 6 oz (41.9 kg)       Exam         PHYSICAL EXAM:    General appearance -appears to be fatigued but resting in bed comfortably   mental status -  normal mood, behavior  Eyes - pupils equal and reactive, extraocular eye movements intact, sclera anicteric  Ears - external ear normal  Nose - normal and patent, no erythema, discharge or polyps  Mouth - mucous membranes moist, pharynx normal without lesions  Neck - supple, no significant adenopathy  Chest - clear to auscultation, no wheezes, rales or rhonchi, symmetric air entry  Heart - normal rate, regular rhythm,no murmurs, rubs, clicks or gallops  Abdomen -the abdomen is soft with mild generalized tenderness. No rebound or guarding. Neurological - alert, oriented, normal speech, no focal findings or movement disorder noted  Extremities - peripheral pulses normal, no clubbing or cyanosis. No edema.   Skin - normal coloration and turgor, no rashes, no suspicious skin lesions noted     Data   Old records and images have been reviewed    Lab Results   CBC     Lab Results   Component Value Date    WBC 5.4 10/12/2020    RBC 3.71 10/12/2020    HGB 10.3 10/12/2020    HCT 30.7 10/12/2020     10/12/2020    MCV 82.7 10/12/2020    MCH 27.8 10/12/2020    MCHC 33.6 10/12/2020    RDW 12.7 10/12/2020    NRBC 0.0 05/10/2019    SEGSPCT 58 09/29/2013    METASPCT 1.0 04/29/2020    LYMPHOPCT 35.9 10/11/2020    MONOPCT 6.0 10/11/2020    MYELOPCT 1.0 04/29/2020    BASOPCT 0.5 10/11/2020    MONOSABS 0.36 10/11/2020    LYMPHSABS 2.15 10/11/2020    EOSABS 0.01 10/11/2020    BASOSABS 0.03 10/11/2020       BMP   Lab Results   Component Value Date     10/12/2020    K 3.9 10/12/2020    K 5.1 10/10/2020     10/12/2020    CO2 20 10/12/2020    BUN 6 10/12/2020    CREATININE 0.4 10/12/2020    GLUCOSE 224 10/12/2020    CALCIUM 9.0 10/12/2020       LFTS  Lab Results   Component Value Date    ALKPHOS 83 10/11/2020    ALT 19 10/11/2020    AST 21 10/11/2020    PROT 7.0 10/11/2020    BILITOT 0.5 10/11/2020    BILIDIR <0.2 09/04/2020    IBILI see below 09/04/2020    LABALBU 3.9 10/11/2020       INR  No results for input(s): PROTIME, INR in the last 72 hours. APTT  No results for input(s): APTT in the last 72 hours. Lactic Acid  Lab Results   Component Value Date    LACTA 1.1 10/11/2020    LACTA 1.5 10/09/2020    LACTA 2.1 10/08/2020        BNP   No results for input(s): BNP in the last 72 hours. Cultures     No results for input(s): BC in the last 72 hours. No results for input(s): [place] in the last 72 hours. No results for input(s): LABURIN in the last 72 hours.           Radiology   Xr Thoracic Spine (3 Views)    Result Date: 10/11/2020  EXAMINATION: THREE XRAY VIEWS OF THE THORACIC SPINE 10/11/2020 10:23 pm COMPARISON: 09/01/2020 HISTORY: ORDERING SYSTEM PROVIDED HISTORY: Back pain TECHNOLOGIST PROVIDED HISTORY: Reason for exam:->Back pain What reading provider will be dictating this exam?->CRC FINDINGS: Thoracic vertebral bodies are normal in height and alignment. No significant degenerative changes. No evidence of fracture. Pedicles are symmetric and intact. Visualized lungs are clear. No acute abnormality of the thoracic spine     Xr Lumbar Spine (min 4 Views)    Result Date: 10/11/2020  EXAMINATION: TWO XRAY VIEWS OF THE RIGHT HIP; 3  XRAY VIEWS OF THE LUMBAR SPINE 10/11/2020 10:23 pm COMPARISON: None. HISTORY: ORDERING SYSTEM PROVIDED HISTORY: fall, pain, r/o fx TECHNOLOGIST PROVIDED HISTORY: Reason for exam:->fall, pain, r/o fx What reading provider will be dictating this exam?->CRC FINDINGS: Lumbar vertebral bodies are normal in height and alignment. No evidence of fracture. Visualized sacrum is unremarkable. No significant degenerative changes. Right hip is intact. No fracture. Xr Hip Right (2-3 Views)    Result Date: 10/11/2020  EXAMINATION: TWO XRAY VIEWS OF THE RIGHT HIP; 3  XRAY VIEWS OF THE LUMBAR SPINE 10/11/2020 10:23 pm COMPARISON: None. HISTORY: ORDERING SYSTEM PROVIDED HISTORY: fall, pain, r/o fx TECHNOLOGIST PROVIDED HISTORY: Reason for exam:->fall, pain, r/o fx What reading provider will be dictating this exam?->CRC FINDINGS: Lumbar vertebral bodies are normal in height and alignment. No evidence of fracture. Visualized sacrum is unremarkable. No significant degenerative changes. Right hip is intact. No fracture. Ct Head Wo Contrast    Result Date: 10/11/2020  EXAMINATION: CT OF THE HEAD WITHOUT CONTRAST  10/11/2020 10:23 pm TECHNIQUE: CT of the head was performed without the administration of intravenous contrast. Dose modulation, iterative reconstruction, and/or weight based adjustment of the mA/kV was utilized to reduce the radiation dose to as low as reasonably achievable.; CT of the cervical spine was performed without the administration of intravenous contrast. Multiplanar reformatted images are provided for review.  Dose modulation, iterative reconstruction, and/or weight based adjustment of the mA/kV was utilized to reduce the radiation dose to as low as reasonably achievable. COMPARISON: None. HISTORY: ORDERING SYSTEM PROVIDED HISTORY: Evaluate intracranial abnormality TECHNOLOGIST PROVIDED HISTORY: Has a \"code stroke\" or \"stroke alert\" been called? ->No Reason for exam:->Evaluate intracranial abnormality What reading provider will be dictating this exam?->CRC FINDINGS: BRAIN/VENTRICLES: There is no acute intracranial hemorrhage, mass effect or midline shift. No abnormal extra-axial fluid collection. The gray-white differentiation is maintained without evidence of an acute infarct. There is no evidence of hydrocephalus. ORBITS: The visualized portion of the orbits demonstrate no acute abnormality. SINUSES: The visualized paranasal sinuses and mastoid air cells demonstrate no acute abnormality. SOFT TISSUES/SKULL:  No acute abnormality of the visualized skull or soft tissues. Cervical spine: Vertebral body heights are intact. Alignment is intact. No subluxation. No significant prevertebral soft tissue swelling. No acute intracranial hemorrhage. No cervical fracture or subluxation. Ct Cervical Spine Wo Contrast    Result Date: 10/11/2020  EXAMINATION: CT OF THE HEAD WITHOUT CONTRAST  10/11/2020 10:23 pm TECHNIQUE: CT of the head was performed without the administration of intravenous contrast. Dose modulation, iterative reconstruction, and/or weight based adjustment of the mA/kV was utilized to reduce the radiation dose to as low as reasonably achievable.; CT of the cervical spine was performed without the administration of intravenous contrast. Multiplanar reformatted images are provided for review. Dose modulation, iterative reconstruction, and/or weight based adjustment of the mA/kV was utilized to reduce the radiation dose to as low as reasonably achievable. COMPARISON: None.  HISTORY: ORDERING SYSTEM PROVIDED HISTORY: Evaluate intracranial abnormality TECHNOLOGIST Portable    Result Date: 10/8/2020  EXAMINATION: ONE XRAY VIEW OF THE CHEST 10/8/2020 12:53 pm COMPARISON: 08/31/2020 HISTORY: ORDERING SYSTEM PROVIDED HISTORY: eval for pneumonia TECHNOLOGIST PROVIDED HISTORY: Reason for exam:->eval for pneumonia FINDINGS: The mediastinal and cardiac contours are normal.  The lungs are clear. There is no focal consolidation, pleural effusion or pneumothorax evident. The bones are unremarkable. No acute cardiopulmonary process identified. Xr Chest portable 10/12/2020  Impression    1. Right internal jugular vein central venous line placement with the tip at    the cavoatrial junction and no immediate complications. 2. No active pulmonary disease. SYSTEMS ASSESSMENT    Neuro   Alert and oriented x3    Respiratory   COVID-19 positive  -Lungs are clear and patient is not requiring supplemental oxygen time  -Maintain precautions      Cardiovascular   Telemetry monitoring  Patient without chest pain  -Negative troponin and d-dimer in the emergency department yesterday    Gastrointestinal   Generalized abdominal pain  -Pain is chronic, patient has been worked up multiple times in the past for pain  -GI prophylaxis, pantoprazole    Renal   Creatinine 0.4  We will repeat BMP in the morning    Infectious Disease   COVID-19 positive  Currently asymptomatic, not receiving any antivirals or steroids  Chest x-ray does not show any pneumonia    Hematology/Oncology   Hemoglobin of 10.3  Ferritin level on October 8 was 39  We will continue monitoring CBC daily  Right internal jugular central venous catheter placed for lab checks  DVT prophylaxis, Lovenox    Endocrine   Type 1 diabetes mellitus  Diabetes noncompliance  Diabetic ketoacidosis  -Initial lab work showing blood sugar of 352, anion gap of 27, hydroxybutyrate of greater than 4.5  -Patient overnight on insulin drip  -Anion gap is closed, insulin drip will be discontinued  -Transition to subcutaneous insulin and p.o. intake    Social/Spiritual/DNR/Other   Full Code    Musculoskeletal   Back pain, likely related to muscle spasm  Has had previous negative MRI and work-up of back  Toradol every 6 hours as needed    Plan to transfer patient to non-ICU floor    Brittany Pineda DO, PGY1.                       10/12/2020, 10:05 AM      I personally saw, examined and provided care for the patient. Radiographs, labs and medication list were reviewed by me independently. I spoke with bedside nursing, therapists and consultants. Critical care services and times documented are independent of procedures and multidisciplinary rounds with Residents. Additionally comprehensive, multidisciplinary rounds were conducted with the MICU team. The case was discussed in detail and plans for care were established. Review of Residents documentation was conducted and revisions were made as appropriate. I agree with the above documented exam, problem list and plan of care.   Nereyda Benavides MD   CCT excluding procedures:38'

## 2020-10-12 NOTE — CARE COORDINATION
COVID POSITIVE 10/8. Unable to meet w/ pt d/t COVID-pt did not answer her room phone-well known to case management d/t multiple admissions- recently discharged on 10/9. Lives w/ grandmother. Independent PTA. Endocrinologist is Dr. Tricia Begum is Dr. Stalin Aaron insulin pump. Has a glucometer and all other diabetic testing supplies. Pt is active w/ [facility]- verified w/ Porfirio Kim @ [facility] seen at her home on 10/11-will need resume order on discharge.  Will follow   Danilo Sotelo

## 2020-10-13 VITALS
HEIGHT: 61 IN | SYSTOLIC BLOOD PRESSURE: 122 MMHG | TEMPERATURE: 98 F | WEIGHT: 92.37 LBS | RESPIRATION RATE: 20 BRPM | HEART RATE: 124 BPM | OXYGEN SATURATION: 97 % | DIASTOLIC BLOOD PRESSURE: 81 MMHG | BODY MASS INDEX: 17.44 KG/M2

## 2020-10-13 LAB
ANION GAP SERPL CALCULATED.3IONS-SCNC: 8 MMOL/L (ref 7–16)
BLOOD CULTURE, ROUTINE: NORMAL
BUN BLDV-MCNC: 10 MG/DL (ref 6–20)
CALCIUM SERPL-MCNC: 9 MG/DL (ref 8.6–10.2)
CHLORIDE BLD-SCNC: 105 MMOL/L (ref 98–107)
CO2: 24 MMOL/L (ref 22–29)
CREAT SERPL-MCNC: 0.5 MG/DL (ref 0.5–1)
CULTURE, BLOOD 2: NORMAL
GFR AFRICAN AMERICAN: >60
GFR NON-AFRICAN AMERICAN: >60 ML/MIN/1.73
GLUCOSE BLD-MCNC: 432 MG/DL (ref 74–99)
MAGNESIUM: 1.7 MG/DL (ref 1.6–2.6)
METER GLUCOSE: 305 MG/DL (ref 74–99)
METER GLUCOSE: 393 MG/DL (ref 74–99)
METER GLUCOSE: 70 MG/DL (ref 74–99)
METER GLUCOSE: 77 MG/DL (ref 74–99)
MRSA CULTURE ONLY: NORMAL
PHOSPHORUS: 4 MG/DL (ref 2.5–4.5)
POTASSIUM SERPL-SCNC: 4.2 MMOL/L (ref 3.5–5)
SODIUM BLD-SCNC: 137 MMOL/L (ref 132–146)

## 2020-10-13 PROCEDURE — 2580000003 HC RX 258: Performed by: INTERNAL MEDICINE

## 2020-10-13 PROCEDURE — 83735 ASSAY OF MAGNESIUM: CPT

## 2020-10-13 PROCEDURE — 99232 SBSQ HOSP IP/OBS MODERATE 35: CPT | Performed by: INTERNAL MEDICINE

## 2020-10-13 PROCEDURE — 6370000000 HC RX 637 (ALT 250 FOR IP): Performed by: INTERNAL MEDICINE

## 2020-10-13 PROCEDURE — 99239 HOSP IP/OBS DSCHRG MGMT >30: CPT | Performed by: INTERNAL MEDICINE

## 2020-10-13 PROCEDURE — 6360000002 HC RX W HCPCS: Performed by: INTERNAL MEDICINE

## 2020-10-13 PROCEDURE — 84100 ASSAY OF PHOSPHORUS: CPT

## 2020-10-13 PROCEDURE — 82962 GLUCOSE BLOOD TEST: CPT

## 2020-10-13 PROCEDURE — 36415 COLL VENOUS BLD VENIPUNCTURE: CPT

## 2020-10-13 PROCEDURE — 80048 BASIC METABOLIC PNL TOTAL CA: CPT

## 2020-10-13 RX ORDER — PSEUDOEPHEDRINE HCL 30 MG
100 TABLET ORAL DAILY
Qty: 30 CAPSULE | Refills: 0 | Status: SHIPPED | OUTPATIENT
Start: 2020-10-14 | End: 2020-11-10

## 2020-10-13 RX ORDER — DOCUSATE SODIUM 100 MG/1
100 CAPSULE, LIQUID FILLED ORAL DAILY
Status: DISCONTINUED | OUTPATIENT
Start: 2020-10-13 | End: 2020-10-13 | Stop reason: HOSPADM

## 2020-10-13 RX ORDER — INSULIN GLARGINE 100 [IU]/ML
13 INJECTION, SOLUTION SUBCUTANEOUS EVERY MORNING
Status: DISCONTINUED | OUTPATIENT
Start: 2020-10-13 | End: 2020-10-13

## 2020-10-13 RX ORDER — DEXTROSE MONOHYDRATE 25 G/50ML
12.5 INJECTION, SOLUTION INTRAVENOUS PRN
Status: DISCONTINUED | OUTPATIENT
Start: 2020-10-13 | End: 2020-10-13 | Stop reason: SDUPTHER

## 2020-10-13 RX ORDER — NICOTINE POLACRILEX 4 MG
15 LOZENGE BUCCAL PRN
Status: DISCONTINUED | OUTPATIENT
Start: 2020-10-13 | End: 2020-10-13 | Stop reason: HOSPADM

## 2020-10-13 RX ORDER — DEXTROSE MONOHYDRATE 50 MG/ML
100 INJECTION, SOLUTION INTRAVENOUS PRN
Status: DISCONTINUED | OUTPATIENT
Start: 2020-10-13 | End: 2020-10-13 | Stop reason: HOSPADM

## 2020-10-13 RX ORDER — DOCUSATE SODIUM 100 MG/1
CAPSULE, LIQUID FILLED ORAL
Status: DISCONTINUED
Start: 2020-10-13 | End: 2020-10-13 | Stop reason: HOSPADM

## 2020-10-13 RX ORDER — INSULIN GLARGINE 100 [IU]/ML
15 INJECTION, SOLUTION SUBCUTANEOUS EVERY MORNING
Status: DISCONTINUED | OUTPATIENT
Start: 2020-10-13 | End: 2020-10-13

## 2020-10-13 RX ADMIN — PANTOPRAZOLE SODIUM 40 MG: 40 TABLET, DELAYED RELEASE ORAL at 08:51

## 2020-10-13 RX ADMIN — INSULIN LISPRO 5 UNITS: 100 INJECTION, SOLUTION INTRAVENOUS; SUBCUTANEOUS at 05:00

## 2020-10-13 RX ADMIN — METOCLOPRAMIDE 10 MG: 10 TABLET ORAL at 16:42

## 2020-10-13 RX ADMIN — INSULIN GLARGINE 13 UNITS: 100 INJECTION, SOLUTION SUBCUTANEOUS at 11:39

## 2020-10-13 RX ADMIN — INSULIN LISPRO 4 UNITS: 100 INJECTION, SOLUTION INTRAVENOUS; SUBCUTANEOUS at 11:39

## 2020-10-13 RX ADMIN — METOCLOPRAMIDE 10 MG: 10 TABLET ORAL at 11:39

## 2020-10-13 RX ADMIN — PANTOPRAZOLE SODIUM 40 MG: 40 TABLET, DELAYED RELEASE ORAL at 16:42

## 2020-10-13 RX ADMIN — ENOXAPARIN SODIUM 40 MG: 40 INJECTION SUBCUTANEOUS at 08:51

## 2020-10-13 RX ADMIN — DOCUSATE SODIUM 100 MG: 100 CAPSULE ORAL at 11:39

## 2020-10-13 RX ADMIN — Medication 10 ML: at 08:52

## 2020-10-13 RX ADMIN — METOCLOPRAMIDE 10 MG: 10 TABLET ORAL at 08:51

## 2020-10-13 RX ADMIN — HYDROCODONE BITARTRATE AND ACETAMINOPHEN 1 TABLET: 5; 325 TABLET ORAL at 16:42

## 2020-10-13 ASSESSMENT — PAIN SCALES - GENERAL: PAINLEVEL_OUTOF10: 10

## 2020-10-13 NOTE — DISCHARGE SUMMARY
[facility] EMERGENCY SERVICE Physician Discharge Summary       DO Belen Powell [address] [address] [facility]  [phone]    Schedule an appointment as soon as possible for a visit in 1 week      Tone Cannon MD  [address] [facility]  [address] [facility],[address] [id number] [phone]            Activity level: as abran    Diet: DIET CARB CONTROL; Carb Control: 4 carbs/meal (approximate 1800 kcals/day)    Dispo:home    Condition at discharge: fair      Patient ID:  Jolie See  [id number]  27 y.o.  1995    Admit date: 10/12/2020    Discharge date and time:  10/13/2020  7:23 PM    Admission Diagnoses: Active Problems:    DKA, type 1, not at goal [facility])    Depressive disorder  Resolved Problems:    * No resolved hospital problems. *      Discharge Diagnoses: Active Problems:    DKA, type 1, not at goal [facility])    Depressive disorder  Resolved Problems:    * No resolved hospital problems. *    DKA  Type 1 uncontrolled  Depressive disorder  Abdominal pain, generalized  covid 19 infection    Consults:  IP CONSULT TO CRITICAL CARE  IP CONSULT TO ENDOCRINOLOGY  IP CONSULT TO CASE MANAGEMENT  IP CONSULT TO PSYCHIATRY  IP CONSULT TO ENDOCRINOLOGY    Procedures: none    Hospital Course: Patient was admitted with DKA, type 1, not at goal ([address] [E10.10]. Patient is a 27 M with DM on insulin pump presents with weakness and had a fall a few days ago . Pain worse at right hip with motion, . She tested positive for covid 19 on 10-8, and denies fever or dyspnea at this time. She is not hypoxic. Pertinent labs at [facility] ER found her to be in DKA and in need of higher level of care. She is now being admitted to icu. Anion gap and glucose improving . She has abdominal pain     Tested positive for covid 19 on 10-8-20    Pt seen and examined by consultants. Pt placed in icu and had insulin gtt. Pt improved. On day of discharge, pt denied fevers, chills,n/v. Discharge planning d/w pt.  Time given for questions and all questions answered. Discharge Exam:  Vitals:    10/12/20 1515 10/12/20 2000 10/13/20 0840 10/13/20 1645   BP: (!) 147/106 (!) 157/102 (!) 136/106 122/81   Pulse: 102 103 117 124   Resp: 18 18 22 20   Temp: 97.8 °F (36.6 °C) 97.8 °F (36.6 °C) 98.4 °F (36.9 °C) 98 °F (36.7 °C)   TempSrc: Oral Oral     SpO2: 100% 100% 100% 97%   Weight:       Height:           Skin: warm and dry, no rash or erythema  Pulmonary/Chest: clear to auscultation bilaterally- no wheezes, rales or rhonchi, normal air movement, no respiratory distress  Cardiovascular: rhythm reg at rate of 98  Abdomen: soft, non-tender, non-distended, normal bowel sounds, no masses or organomegaly  Extremities: no cyanosis, no clubbing and no edema  No intake/output data recorded. No intake/output data recorded. LABS:  Recent Labs     10/11/20  2233 10/12/20  0925 10/13/20  0629    134 137   K 4.2 3.9 4.2   CL 95* 105 105   CO2 13* 20* 24   BUN 12 6 10   CREATININE 0.5 0.4* 0.5   GLUCOSE 352* 224* 432*   CALCIUM 9.3 9.0 9.0       Recent Labs     10/11/20  2233 10/12/20  0925   WBC 6.0 5.4   RBC 4.13 3.71   HGB 11.4* 10.3*   HCT 34.6 30.7*   MCV 83.8 82.7   MCH 27.6 27.8   MCHC 32.9 33.6   RDW 12.8 12.7    216   MPV 11.3 10.3       No results for input(s): POCGLU in the last 72 hours. BMP:    Lab Results   Component Value Date     10/13/2020    K 4.2 10/13/2020    K 5.1 10/10/2020     10/13/2020    CO2 24 10/13/2020    BUN 10 10/13/2020    LABALBU 3.9 10/11/2020    CREATININE 0.5 10/13/2020    CALCIUM 9.0 10/13/2020    GFRAA >60 10/13/2020    LABGLOM >60 10/13/2020    GLUCOSE 432 10/13/2020     Magnesium:    Lab Results   Component Value Date    MG 1.7 10/13/2020     Phosphorus:    Lab Results   Component Value Date    PHOS 4.0 10/13/2020       Imaging:   XR CHEST PORTABLE   Final Result   1.  Right internal jugular vein central venous line placement with the tip at   the cavoatrial junction and no immediate complications. 2. No active pulmonary disease. Patient Instructions:      Medication List      START taking these medications    docusate 100 MG Caps  Commonly known as:  COLACE, DULCOLAX  Take 100 mg by mouth daily Hold for diarrhea  Start taking on:  October 14, 2020     mirtazapine 15 MG tablet  Commonly known as:  REMERON  Take 1 tablet by mouth nightly        CHANGE how you take these medications    * insulin aspart 100 UNIT/ML injection vial  Commonly known as:  NovoLOG  Use this scale until restarting insulin pump in the AM  TID with meals  - No insulin  140-199 1 unit  200-249 2 units  250-299- 3 units  300-349 4 units  350-399 5 units  Over 399 6 units    Night time    no insulin  140-249 1 unit  250-349 2 units  Over 350 3 unit  What changed:    · how to take this  · when to take this  · additional instructions     * insulin aspart 100 UNIT/ML injection vial  Commonly known as:  NovoLOG  Per pump- restart pump tomorrow morning  What changed: You were already taking a medication with the same name, and this prescription was added. Make sure you understand how and when to take each. * This list has 2 medication(s) that are the same as other medications prescribed for you. Read the directions carefully, and ask your doctor or other care provider to review them with you. CONTINUE taking these medications    acetone (urine) test strip  Use daily as directed if bs >250 x2 or illness     * Contour Next Test strip  Generic drug:  blood glucose test strips  Neelima Contour test strips. Checks 4 times/day before meals and at bedtime and as needed for symptoms of irregular blood glucose     * Contour Next Test strip  Generic drug:  blood glucose test strips  Neelima Contour test strips.  Checks 4 times/day before meals and at bedtime and as needed for symptoms of irregular blood glucose     doxepin 5 % cream  Commonly known as:  ZONALON  APPLY ONE GRAM (ONE PUMP) EXTERNALLY THREE TIMES A DAY TO PAIN AREA TO LOWER BACK     ketorolac 10 MG tablet  Commonly known as:  TORADOL  Take 1 tablet by mouth every 6 hours as needed for Pain     metoclopramide 10 MG tablet  Commonly known as:  Reglan  Take 1 tablet by mouth 4 times daily     naproxen 250 MG tablet  Commonly known as:  NAPROSYN  Take 1 tablet by mouth 2 times daily (with meals)     pantoprazole 40 MG tablet  Commonly known as:  PROTONIX  Take 1 tablet by mouth 2 times daily (before meals)     VersaPro Gel  APPLY ONE GRAM (ONE PUMP) EXTERNALLY FOUR TIMES A DAY TO LOWER BACK         * This list has 2 medication(s) that are the same as other medications prescribed for you. Read the directions carefully, and ask your doctor or other care provider to review them with you.             STOP taking these medications    Insulin Pump - Insulin regular           Where to Get Your Medications      These medications were sent to [id number] [place] [address], [address]    Phone:  [phone]   · docusate 100 MG Caps  · mirtazapine 15 MG tablet     Information about where to get these medications is not yet available    Ask your nurse or doctor about these medications  · insulin aspart 100 UNIT/ML injection vial  · insulin aspart 100 UNIT/ML injection vial           Total time for discharge is 37 min    Signed:  Electronically signed by Laurel Mathur DO on 10/13/2020 at 7:23 PM

## 2020-10-13 NOTE — HOME CARE
PATIENT IS CURRENT WITH [facility] AND WILL NEED NAYELI ORDERS UPON DISCHARGE.      Calli Score, LPN   [facility] HOME CARE

## 2020-10-13 NOTE — PROGRESS NOTES
Discharge paperwork reviewed with patient. This note is placed in lieu of patient signature due to covid precuations.

## 2020-10-13 NOTE — CARE COORDINATION
CM NOTE: Per QFR--- transferred from ICU to 6W. Covid positive in droplet plus isolation. Per CM note, pt agreeable to [facility]. [facility] 18/24---does not qualify for rehab under payor. Plan insulin pump & discharge home per nurse.

## 2020-10-13 NOTE — PROGRESS NOTES
Blood sugar check 70. Rechecked and it is 77. Pt reports she did not eat lunch after getting lunchtime insulin. April Baum is in front of pt now and pt is eating.

## 2020-10-13 NOTE — PROGRESS NOTES
Notified Dr. Praful Rosales that pt reports no BM since last week and that this has been ongoing for a few months paired with lower abd pain and lower back pain. States that stools are almost always hard and do sometimes cause pain. Pt is declining taking norco, stating norco and toradol did not help her yesterday. Pt received x1 dose of morphine overnight, which she is requesting more of, saying it is the only medication that has eased the pain. Dr. Praful Rosales aware of this as well. Pt has been advised by RN that narcotics can slow motility and cause further constipation and should be avoided if possible. Stool softener ordered by Dr. Praful Rosales, pt educated and given.

## 2020-10-13 NOTE — PROGRESS NOTES
ENDOCRINOLOGY PROGRESS NOTE  TELEMEDICINE SERVICE    Date of Service: 10/13/2020  Date of Admission: 10/12/2020  Admitting Physician: Aliza Sanchez MD   Primary Care Physician: Dany Curtis DO  Consultant physician: Naga Mcbride MD     Reason for the consultation:  DM type 1 admitted with DKA     History of Present Illness:  Services were provided through a video synchronous discussion     Arielle Moreno is a 27 y.o. old female with PMH of DM type 1 admitted to [facility] on 10/12/2020 because of nausea and vomiting and found to be in DKA, endocrine team was consulted for diabetes management. The patient has been in and out of the hospital frequently with DKA due to noncompliance with diabetic management  Patient also c/o back pain for which has been going on for almost 5  months.  previous work up including MRI were negative. The patient denied cough, fever, chills, chest pain or SOB     Up on arrival to ER, , AG 27, Bicarb 13, K 4.2, Cr 0.5     COVID swab test was positive     Subjective:  Patient seen and examined, no overnight major events. Appetite is good.  She is eating well, BG up to 400s this AM      Inpatient diet:   Carb Restricted diet     Point of care glucose monitoring:   Independently reviewed   Recent Labs     10/12/20  0556 10/12/20  0910 10/12/20  1026 10/12/20  1140 10/12/20  1631 10/12/20  2010 10/13/20  0624 10/13/20  1139   GLUMET 137* 218* 176* 129* 187* 215* 393* 305*       Scheduled Meds:   insulin glargine  13 Units Subcutaneous QAM    insulin lispro  4 Units Subcutaneous TID WC    docusate sodium  100 mg Oral Daily    docusate sodium        metoclopramide  10 mg Oral 4x Daily    pantoprazole  40 mg Oral BID AC    sodium chloride flush  10 mL Intravenous 2 times per day    enoxaparin  40 mg Subcutaneous Daily    insulin lispro  0-6 Units Subcutaneous TID WC    insulin lispro  0-3 Units Subcutaneous Nightly    mirtazapine  15 mg Oral Nightly     PRN Meds:   glucose, 15 g, PRN  glucagon (rDNA), 1 mg, PRN  dextrose, 100 mL/hr, PRN  sodium chloride flush, 10 mL, PRN  acetaminophen, 650 mg, Q6H PRN    Or  acetaminophen, 650 mg, Q6H PRN  polyethylene glycol, 17 g, Daily PRN  promethazine, 12.5 mg, Q6H PRN    Or  ondansetron, 4 mg, Q6H PRN  dextrose, 12.5 g, PRN  HYDROcodone 5 mg - acetaminophen, 1 tablet, Q6H PRN  ketorolac, 15 mg, Q6H PRN      Continuous Infusions:   dextrose         Review of Systems  All systems reviewed. All negative except for symptoms mentioned in HPI     OBJECTIVE    BP (!) 136/106   Pulse 117   Temp 98.4 °F (36.9 °C)   Resp 22   Ht 5' 1\" (1.549 m)   Wt 92 lb 6 oz (41.9 kg)   LMP  (LMP Unknown)   SpO2 100%   BMI 17.45 kg/m²   No intake or output data in the 24 hours ending 10/13/20 1324    Physical examination:  General: awake alert, oriented x3, no abnormal position or movements. HEENT: normocephalic non-traumatic, no exophthalmos   Neck: supple, no LN enlargement, no thyromegaly, no thyroid tenderness, no JVD. Pulm: Clear equal air entry no added sounds, no wheezing or rhonchi    CVS: S1 + S2, no murmur, no heave  Abd: soft lax, no tenderness, no organomegaly, audible bowel sounds. Skin: warm, no lesions, no rash. Feet: sensory exam of the feet is present, tested with the monofilament. No ulcers or open wounds.  Good peripheral pulses  Neuro: CN intact, muscle power normal  Psych: normal mood, and affect    Review of Laboratory Data:  I have reviewed the following:  Recent Labs     10/10/20  1628 10/11/20  2233 10/12/20  0925   WBC 5.3 6.0 5.4   RBC 3.85 4.13 3.71   HGB 10.7* 11.4* 10.3*   HCT 32.1* 34.6 30.7*   MCV 83.4 83.8 82.7   MCH 27.8 27.6 27.8   MCHC 33.3 32.9 33.6   RDW 13.1 12.8 12.7    226 216   MPV 11.2 11.3 10.3     Recent Labs     10/11/20  2233 10/12/20  0925 10/13/20  0629    134 137   K 4.2 3.9 4.2   CL 95* 105 105   CO2 13* 20* 24   BUN 12 6 10   CREATININE 0.5 0.4* 0.5   GLUCOSE 352* 224* 432*   CALCIUM 9.3 9.0 9. 0   PROT 7.0  --   --    LABALBU 3.9  --   --    BILITOT 0.5  --   --    ALKPHOS 83  --   --    AST 21  --   --    ALT 19  --   --      Beta-Hydroxybutyrate   Date Value Ref Range Status   10/11/2020 >4.50 (H) 0.02 - 0.27 mmol/L Final   10/10/2020 2.22 (H) 0.02 - 0.27 mmol/L Final   10/08/2020 >4.50 (H) 0.02 - 0.27 mmol/L Final     Lab Results   Component Value Date    LABA1C 13.0 05/30/2020    LABA1C 15.9 03/09/2020    LABA1C 14.2 08/06/2019     No results found for: TSH, T4FREE, I3CZDCT, FT3, L9TCXQR, TSI, TPOABS, THGAB  Lab Results   Component Value Date    LABA1C 13.0 05/30/2020    GLUCOSE 432 10/13/2020    MALBCR 204.6 01/23/2018    LABMICR 102.3 01/23/2018    LABCREA 207 09/02/2020     Lab Results   Component Value Date    TRIG 106 01/24/2018    HDL 40 01/24/2018    LDLCALC 114 01/24/2018    CHOL 175 01/24/2018       Blood culture   Lab Results   Component Value Date    BC 24 Hours no growth 10/08/2020    BC 5 Days no growth 08/31/2020       Radiology:  XR CHEST PORTABLE   Final Result   1. Right internal jugular vein central venous line placement with the tip at   the cavoatrial junction and no immediate complications. 2. No active pulmonary disease. Medical Records/Labs/Images review:   I personally reviewed and summarized previous records   All labs and imaging were reviewed independently     Bob Espinoza, a 27 y.o.-old female seen today for inpatient diabetes management      DM1 admitted with DKA   · Patient's DM is poorly controlled  due to poor compliance with diet and BS checking   · Received lantus 13 U(increase from 10 U) this AM  · Plan to restart insulin pump tomorrow morning(24 hours after last dose of lantus), she is instructed to do short acting insulin at home(4U with meal and LD S/S)  · Home insulin pump settings: Basal rate 12a 0.8, CR 14, ISF 53, goal 12a 110-130, 7a 100-120, 9p 110-130, active 3 hrs. · For now  ? Lantus 13 units daily  ?  Low dose sliding scale   ? 4 U lispro with meal as prandial meal coverage   · Glucose check before meals and at bed time   · Will titrate insulin dose based on blood glucose trend & insulin requirement  · Outpatient Endocrinology clinic appointment settled on 11/9/2020 11:30 AM     COVID positive  · Pt not on steroids now  · Plan to be discharged today by primary     The above issues were reviewed with the patient who understood and agreed with the plan. Thank you for allowing us to participate in the care of this patient. Please do not hesitate to contact us with any additional questions. Montana Fuller MD  Endocrinologist, [facility]   [address] [place] [address]   Phone: [phone]  Fax: [phone]  --------------------------------------------  An electronic signature was used to authenticate this note.  Martine Barrera MD on 10/13/2020 at 1:24 PM

## 2020-10-14 ENCOUNTER — CARE COORDINATION (OUTPATIENT)
Dept: CASE MANAGEMENT | Age: 25
End: 2020-10-14

## 2020-10-14 NOTE — CARE COORDINATION
COVID-19 Monitoring Initial Follow-up Note    First attempt to reach the patient for COVID Monitoring (positive) Care Transition call post hospital discharge, voicemail box is full, unable to leave a message.

## 2020-10-15 ENCOUNTER — CARE COORDINATION (OUTPATIENT)
Dept: CASE MANAGEMENT | Age: 25
End: 2020-10-15

## 2020-10-15 NOTE — CARE COORDINATION
COVID-19 Monitoring Initial Follow-up Note    Second attempt to reach the patient for COVID Monitoring (positive) Care Transition call post hospital discharge, voicemail box remains full. Will sign off.

## 2020-10-17 ENCOUNTER — HOSPITAL ENCOUNTER (INPATIENT)
Age: 25
LOS: 2 days | Discharge: HOME | DRG: 420 | End: 2020-10-20
Attending: EMERGENCY MEDICINE | Admitting: INTERNAL MEDICINE
Payer: COMMERCIAL

## 2020-10-17 ENCOUNTER — APPOINTMENT (OUTPATIENT)
Dept: GENERAL RADIOLOGY | Age: 25
DRG: 420 | End: 2020-10-17
Payer: COMMERCIAL

## 2020-10-17 LAB
ACETAMINOPHEN LEVEL: <5 MCG/ML (ref 10–30)
ALBUMIN SERPL-MCNC: 4 G/DL (ref 3.5–5.2)
ALP BLD-CCNC: 71 U/L (ref 35–104)
ALT SERPL-CCNC: 26 U/L (ref 0–32)
ANION GAP SERPL CALCULATED.3IONS-SCNC: 42 MMOL/L (ref 7–16)
AST SERPL-CCNC: 20 U/L (ref 0–31)
BETA-HYDROXYBUTYRATE: >4.5 MMOL/L (ref 0.02–0.27)
BILIRUB SERPL-MCNC: 0.3 MG/DL (ref 0–1.2)
BUN BLDV-MCNC: 34 MG/DL (ref 6–20)
CALCIUM SERPL-MCNC: 9.7 MG/DL (ref 8.6–10.2)
CHLORIDE BLD-SCNC: 81 MMOL/L (ref 98–107)
CO2: 6 MMOL/L (ref 22–29)
CREAT SERPL-MCNC: 1 MG/DL (ref 0.5–1)
ETHANOL: <10 MG/DL (ref 0–0.08)
GFR AFRICAN AMERICAN: >60
GFR AFRICAN AMERICAN: >60
GFR NON-AFRICAN AMERICAN: 56 ML/MIN/1.73
GFR NON-AFRICAN AMERICAN: >60 ML/MIN/1.73
GLUCOSE BLD-MCNC: 1059 MG/DL (ref 74–99)
GLUCOSE BLD-MCNC: >700 MG/DL (ref 74–99)
HCT VFR BLD CALC: 34.1 % (ref 34–48)
HEMOGLOBIN: 10.5 G/DL (ref 11.5–15.5)
LIPASE: 7 U/L (ref 13–60)
MAGNESIUM: 2.6 MG/DL (ref 1.6–2.6)
MCH RBC QN AUTO: 27.4 PG (ref 26–35)
MCHC RBC AUTO-ENTMCNC: 30.8 % (ref 32–34.5)
MCV RBC AUTO: 89 FL (ref 80–99.9)
PDW BLD-RTO: 13.8 FL (ref 11.5–15)
PERFORMED ON: ABNORMAL
PH VENOUS: 7.17 (ref 7.35–7.45)
PHOSPHORUS: 9.9 MG/DL (ref 2.5–4.5)
PLATELET # BLD: 424 E9/L (ref 130–450)
PMV BLD AUTO: 11.5 FL (ref 7–12)
POC CHLORIDE: 101 MMOL/L (ref 100–108)
POC CREATININE: 1.2 MG/DL (ref 0.5–1)
POC POTASSIUM: 6.3 MMOL/L (ref 3.5–5)
POC SODIUM: 124 MMOL/L (ref 132–146)
POTASSIUM SERPL-SCNC: 6.4 MMOL/L (ref 3.5–5)
RBC # BLD: 3.83 E12/L (ref 3.5–5.5)
SALICYLATE, SERUM: <0.3 MG/DL (ref 0–30)
SARS-COV-2, NAAT: NOT DETECTED
SODIUM BLD-SCNC: 129 MMOL/L (ref 132–146)
TOTAL PROTEIN: 7 G/DL (ref 6.4–8.3)
TRICYCLIC ANTIDEPRESSANTS SCREEN SERUM: NEGATIVE NG/ML
WBC # BLD: 17 E9/L (ref 4.5–11.5)

## 2020-10-17 PROCEDURE — 96374 THER/PROPH/DIAG INJ IV PUSH: CPT

## 2020-10-17 PROCEDURE — 6360000002 HC RX W HCPCS

## 2020-10-17 PROCEDURE — 71045 X-RAY EXAM CHEST 1 VIEW: CPT

## 2020-10-17 PROCEDURE — 82435 ASSAY OF BLOOD CHLORIDE: CPT

## 2020-10-17 PROCEDURE — G0480 DRUG TEST DEF 1-7 CLASSES: HCPCS

## 2020-10-17 PROCEDURE — 96375 TX/PRO/DX INJ NEW DRUG ADDON: CPT

## 2020-10-17 PROCEDURE — 84132 ASSAY OF SERUM POTASSIUM: CPT

## 2020-10-17 PROCEDURE — U0002 COVID-19 LAB TEST NON-CDC: HCPCS

## 2020-10-17 PROCEDURE — 80307 DRUG TEST PRSMV CHEM ANLYZR: CPT

## 2020-10-17 PROCEDURE — 99291 CRITICAL CARE FIRST HOUR: CPT

## 2020-10-17 PROCEDURE — 82800 BLOOD PH: CPT

## 2020-10-17 PROCEDURE — 6360000002 HC RX W HCPCS: Performed by: EMERGENCY MEDICINE

## 2020-10-17 PROCEDURE — 36556 INSERT NON-TUNNEL CV CATH: CPT

## 2020-10-17 PROCEDURE — 85027 COMPLETE CBC AUTOMATED: CPT

## 2020-10-17 PROCEDURE — 83735 ASSAY OF MAGNESIUM: CPT

## 2020-10-17 PROCEDURE — 84295 ASSAY OF SERUM SODIUM: CPT

## 2020-10-17 PROCEDURE — 84100 ASSAY OF PHOSPHORUS: CPT

## 2020-10-17 PROCEDURE — 83690 ASSAY OF LIPASE: CPT

## 2020-10-17 PROCEDURE — 82947 ASSAY GLUCOSE BLOOD QUANT: CPT

## 2020-10-17 PROCEDURE — 02HV33Z INSERTION OF INFUSION DEVICE INTO SUPERIOR VENA CAVA, PERCUTANEOUS APPROACH: ICD-10-PCS | Performed by: EMERGENCY MEDICINE

## 2020-10-17 PROCEDURE — 99283 EMERGENCY DEPT VISIT LOW MDM: CPT

## 2020-10-17 PROCEDURE — 82565 ASSAY OF CREATININE: CPT

## 2020-10-17 PROCEDURE — 80053 COMPREHEN METABOLIC PANEL: CPT

## 2020-10-17 PROCEDURE — 82010 KETONE BODYS QUAN: CPT

## 2020-10-17 PROCEDURE — 2580000003 HC RX 258: Performed by: EMERGENCY MEDICINE

## 2020-10-17 RX ORDER — 0.9 % SODIUM CHLORIDE 0.9 %
15 INTRAVENOUS SOLUTION INTRAVENOUS ONCE
Status: COMPLETED | OUTPATIENT
Start: 2020-10-18 | End: 2020-10-18

## 2020-10-17 RX ORDER — DEXTROSE, SODIUM CHLORIDE, AND POTASSIUM CHLORIDE 5; .45; .15 G/100ML; G/100ML; G/100ML
INJECTION INTRAVENOUS CONTINUOUS PRN
Status: DISCONTINUED | OUTPATIENT
Start: 2020-10-17 | End: 2020-10-18

## 2020-10-17 RX ORDER — MORPHINE SULFATE 2 MG/ML
1 INJECTION, SOLUTION INTRAMUSCULAR; INTRAVENOUS ONCE
Status: COMPLETED | OUTPATIENT
Start: 2020-10-17 | End: 2020-10-17

## 2020-10-17 RX ORDER — 0.9 % SODIUM CHLORIDE 0.9 %
1000 INTRAVENOUS SOLUTION INTRAVENOUS ONCE
Status: COMPLETED | OUTPATIENT
Start: 2020-10-17 | End: 2020-10-17

## 2020-10-17 RX ORDER — POTASSIUM CHLORIDE 7.45 MG/ML
10 INJECTION INTRAVENOUS PRN
Status: DISCONTINUED | OUTPATIENT
Start: 2020-10-17 | End: 2020-10-18

## 2020-10-17 RX ORDER — DEXTROSE MONOHYDRATE 25 G/50ML
12.5 INJECTION, SOLUTION INTRAVENOUS PRN
Status: DISCONTINUED | OUTPATIENT
Start: 2020-10-17 | End: 2020-10-18

## 2020-10-17 RX ORDER — ONDANSETRON 2 MG/ML
4 INJECTION INTRAMUSCULAR; INTRAVENOUS ONCE
Status: COMPLETED | OUTPATIENT
Start: 2020-10-17 | End: 2020-10-17

## 2020-10-17 RX ORDER — SODIUM CHLORIDE 9 MG/ML
INJECTION, SOLUTION INTRAVENOUS CONTINUOUS
Status: DISCONTINUED | OUTPATIENT
Start: 2020-10-18 | End: 2020-10-19

## 2020-10-17 RX ORDER — ONDANSETRON 2 MG/ML
INJECTION INTRAMUSCULAR; INTRAVENOUS
Status: COMPLETED
Start: 2020-10-17 | End: 2020-10-17

## 2020-10-17 RX ORDER — MAGNESIUM SULFATE 1 G/100ML
1 INJECTION INTRAVENOUS PRN
Status: DISCONTINUED | OUTPATIENT
Start: 2020-10-17 | End: 2020-10-19

## 2020-10-17 RX ADMIN — SODIUM CHLORIDE 1000 ML: 9 INJECTION, SOLUTION INTRAVENOUS at 21:49

## 2020-10-17 RX ADMIN — MORPHINE SULFATE 1 MG: 2 INJECTION, SOLUTION INTRAMUSCULAR; INTRAVENOUS at 21:49

## 2020-10-17 RX ADMIN — ONDANSETRON 4 MG: 2 INJECTION INTRAMUSCULAR; INTRAVENOUS at 21:52

## 2020-10-17 ASSESSMENT — PAIN DESCRIPTION - LOCATION: LOCATION: ABDOMEN;BACK

## 2020-10-17 ASSESSMENT — PAIN SCALES - GENERAL
PAINLEVEL_OUTOF10: 10
PAINLEVEL_OUTOF10: 10

## 2020-10-17 ASSESSMENT — PAIN DESCRIPTION - PAIN TYPE: TYPE: ACUTE PAIN

## 2020-10-17 ASSESSMENT — PAIN DESCRIPTION - FREQUENCY: FREQUENCY: CONTINUOUS

## 2020-10-17 ASSESSMENT — PAIN DESCRIPTION - ONSET: ONSET: UNABLE TO TELL

## 2020-10-17 ASSESSMENT — PAIN DESCRIPTION - PROGRESSION: CLINICAL_PROGRESSION: GRADUALLY WORSENING

## 2020-10-17 ASSESSMENT — PAIN DESCRIPTION - DESCRIPTORS: DESCRIPTORS: ACHING;CONSTANT

## 2020-10-18 LAB
AMORPHOUS: ABNORMAL
AMPHETAMINE SCREEN, URINE: NOT DETECTED
ANION GAP SERPL CALCULATED.3IONS-SCNC: 10 MMOL/L (ref 7–16)
ANION GAP SERPL CALCULATED.3IONS-SCNC: 11 MMOL/L (ref 7–16)
ANION GAP SERPL CALCULATED.3IONS-SCNC: 12 MMOL/L (ref 7–16)
ANION GAP SERPL CALCULATED.3IONS-SCNC: 21 MMOL/L (ref 7–16)
ANION GAP SERPL CALCULATED.3IONS-SCNC: 37 MMOL/L (ref 7–16)
BACTERIA: ABNORMAL /HPF
BARBITURATE SCREEN URINE: NOT DETECTED
BENZODIAZEPINE SCREEN, URINE: NOT DETECTED
BILIRUBIN URINE: NEGATIVE
BLOOD, URINE: ABNORMAL
BUN BLDV-MCNC: 15 MG/DL (ref 6–20)
BUN BLDV-MCNC: 19 MG/DL (ref 6–20)
BUN BLDV-MCNC: 23 MG/DL (ref 6–20)
BUN BLDV-MCNC: 28 MG/DL (ref 6–20)
BUN BLDV-MCNC: 31 MG/DL (ref 6–20)
C-REACTIVE PROTEIN: <0.1 MG/DL (ref 0–0.4)
CALCIUM SERPL-MCNC: 8.1 MG/DL (ref 8.6–10.2)
CALCIUM SERPL-MCNC: 8.4 MG/DL (ref 8.6–10.2)
CALCIUM SERPL-MCNC: 8.4 MG/DL (ref 8.6–10.2)
CALCIUM SERPL-MCNC: 8.7 MG/DL (ref 8.6–10.2)
CALCIUM SERPL-MCNC: 9 MG/DL (ref 8.6–10.2)
CANNABINOID SCREEN URINE: NOT DETECTED
CHLORIDE BLD-SCNC: 101 MMOL/L (ref 98–107)
CHLORIDE BLD-SCNC: 106 MMOL/L (ref 98–107)
CHLORIDE BLD-SCNC: 107 MMOL/L (ref 98–107)
CHLORIDE BLD-SCNC: 108 MMOL/L (ref 98–107)
CHLORIDE BLD-SCNC: 94 MMOL/L (ref 98–107)
CHLORIDE URINE RANDOM: <20 MMOL/L
CK MB: <1 NG/ML (ref 0–4.3)
CLARITY: CLEAR
CO2: 16 MMOL/L (ref 22–29)
CO2: 20 MMOL/L (ref 22–29)
CO2: 5 MMOL/L (ref 22–29)
COCAINE METABOLITE SCREEN URINE: NOT DETECTED
COLOR: YELLOW
CREAT SERPL-MCNC: 0.6 MG/DL (ref 0.5–1)
CREAT SERPL-MCNC: 0.8 MG/DL (ref 0.5–1)
CREAT SERPL-MCNC: 0.9 MG/DL (ref 0.5–1)
CREATININE URINE: 12 MG/DL (ref 29–226)
D DIMER: 257 NG/ML DDU
EPITHELIAL CELLS, UA: ABNORMAL /HPF
FENTANYL SCREEN, URINE: NOT DETECTED
FERRITIN: 64 NG/ML
FIBRINOGEN: 369 MG/DL (ref 225–540)
GFR AFRICAN AMERICAN: >60
GFR NON-AFRICAN AMERICAN: >60 ML/MIN/1.73
GLUCOSE BLD-MCNC: 171 MG/DL (ref 74–99)
GLUCOSE BLD-MCNC: 176 MG/DL (ref 74–99)
GLUCOSE BLD-MCNC: 197 MG/DL (ref 74–99)
GLUCOSE BLD-MCNC: 347 MG/DL (ref 74–99)
GLUCOSE BLD-MCNC: 355 MG/DL (ref 74–99)
GLUCOSE BLD-MCNC: 528 MG/DL (ref 74–99)
GLUCOSE BLD-MCNC: 861 MG/DL (ref 74–99)
GLUCOSE URINE: >=1000 MG/DL
HBA1C MFR BLD: 11.2 % (ref 4–5.6)
KETONES, URINE: >=80 MG/DL
LACTATE DEHYDROGENASE: 261 U/L (ref 135–214)
LACTIC ACID: 1.6 MMOL/L (ref 0.5–2.2)
LACTIC ACID: 3.1 MMOL/L (ref 0.5–2.2)
LACTIC ACID: 4.9 MMOL/L (ref 0.5–2.2)
LEUKOCYTE ESTERASE, URINE: NEGATIVE
LIPASE: 6 U/L (ref 13–60)
Lab: ABNORMAL
MAGNESIUM: 1.8 MG/DL (ref 1.6–2.6)
MAGNESIUM: 1.9 MG/DL (ref 1.6–2.6)
MAGNESIUM: 2 MG/DL (ref 1.6–2.6)
MAGNESIUM: 2.1 MG/DL (ref 1.6–2.6)
MAGNESIUM: 2.2 MG/DL (ref 1.6–2.6)
METER GLUCOSE: 108 MG/DL (ref 74–99)
METER GLUCOSE: 150 MG/DL (ref 74–99)
METER GLUCOSE: 157 MG/DL (ref 74–99)
METER GLUCOSE: 160 MG/DL (ref 74–99)
METER GLUCOSE: 172 MG/DL (ref 74–99)
METER GLUCOSE: 180 MG/DL (ref 74–99)
METER GLUCOSE: 180 MG/DL (ref 74–99)
METER GLUCOSE: 184 MG/DL (ref 74–99)
METER GLUCOSE: 195 MG/DL (ref 74–99)
METER GLUCOSE: 218 MG/DL (ref 74–99)
METER GLUCOSE: 235 MG/DL (ref 74–99)
METER GLUCOSE: 269 MG/DL (ref 74–99)
METER GLUCOSE: 275 MG/DL (ref 74–99)
METER GLUCOSE: 337 MG/DL (ref 74–99)
METER GLUCOSE: >500 MG/DL (ref 74–99)
METHADONE SCREEN, URINE: NOT DETECTED
NITRITE, URINE: NEGATIVE
OPIATE SCREEN URINE: POSITIVE
OXYCODONE URINE: NOT DETECTED
PH UA: 6 (ref 5–9)
PHENCYCLIDINE SCREEN URINE: NOT DETECTED
PHOSPHORUS: 2.3 MG/DL (ref 2.5–4.5)
PHOSPHORUS: 2.7 MG/DL (ref 2.5–4.5)
PHOSPHORUS: 3.2 MG/DL (ref 2.5–4.5)
PHOSPHORUS: 3.7 MG/DL (ref 2.5–4.5)
PHOSPHORUS: 7.3 MG/DL (ref 2.5–4.5)
POTASSIUM SERPL-SCNC: 4 MMOL/L (ref 3.5–5)
POTASSIUM SERPL-SCNC: 4.5 MMOL/L (ref 3.5–5)
POTASSIUM SERPL-SCNC: 4.6 MMOL/L (ref 3.5–5)
POTASSIUM SERPL-SCNC: 4.7 MMOL/L (ref 3.5–5)
POTASSIUM SERPL-SCNC: 5.2 MMOL/L (ref 3.5–5)
POTASSIUM, UR: 22.8 MMOL/L
PROCALCITONIN: 1.96 NG/ML (ref 0–0.08)
PROTEIN UA: NEGATIVE MG/DL
RBC UA: ABNORMAL /HPF (ref 0–2)
SODIUM BLD-SCNC: 136 MMOL/L (ref 132–146)
SODIUM BLD-SCNC: 138 MMOL/L (ref 132–146)
SODIUM URINE: 67 MMOL/L
SPECIFIC GRAVITY UA: 1.01 (ref 1–1.03)
TOTAL CK: 99 U/L (ref 20–180)
TROPONIN: <0.01 NG/ML (ref 0–0.03)
UREA NITROGEN, UR: 191 MG/DL (ref 800–1666)
UROBILINOGEN, URINE: 0.2 E.U./DL
WBC UA: ABNORMAL /HPF (ref 0–5)

## 2020-10-18 PROCEDURE — 84300 ASSAY OF URINE SODIUM: CPT

## 2020-10-18 PROCEDURE — 6370000000 HC RX 637 (ALT 250 FOR IP): Performed by: EMERGENCY MEDICINE

## 2020-10-18 PROCEDURE — 80307 DRUG TEST PRSMV CHEM ANLYZR: CPT

## 2020-10-18 PROCEDURE — 81025 URINE PREGNANCY TEST: CPT

## 2020-10-18 PROCEDURE — 36415 COLL VENOUS BLD VENIPUNCTURE: CPT

## 2020-10-18 PROCEDURE — 81001 URINALYSIS AUTO W/SCOPE: CPT

## 2020-10-18 PROCEDURE — 87088 URINE BACTERIA CULTURE: CPT

## 2020-10-18 PROCEDURE — 82947 ASSAY GLUCOSE BLOOD QUANT: CPT

## 2020-10-18 PROCEDURE — 84145 PROCALCITONIN (PCT): CPT

## 2020-10-18 PROCEDURE — 83036 HEMOGLOBIN GLYCOSYLATED A1C: CPT

## 2020-10-18 PROCEDURE — 85384 FIBRINOGEN ACTIVITY: CPT

## 2020-10-18 PROCEDURE — 6360000002 HC RX W HCPCS: Performed by: INTERNAL MEDICINE

## 2020-10-18 PROCEDURE — 2580000003 HC RX 258: Performed by: EMERGENCY MEDICINE

## 2020-10-18 PROCEDURE — 82550 ASSAY OF CK (CPK): CPT

## 2020-10-18 PROCEDURE — 86140 C-REACTIVE PROTEIN: CPT

## 2020-10-18 PROCEDURE — 84100 ASSAY OF PHOSPHORUS: CPT

## 2020-10-18 PROCEDURE — 80048 BASIC METABOLIC PNL TOTAL CA: CPT

## 2020-10-18 PROCEDURE — 83690 ASSAY OF LIPASE: CPT

## 2020-10-18 PROCEDURE — 2580000003 HC RX 258: Performed by: STUDENT IN AN ORGANIZED HEALTH CARE EDUCATION/TRAINING PROGRAM

## 2020-10-18 PROCEDURE — 6370000000 HC RX 637 (ALT 250 FOR IP): Performed by: INTERNAL MEDICINE

## 2020-10-18 PROCEDURE — 2500000003 HC RX 250 WO HCPCS: Performed by: EMERGENCY MEDICINE

## 2020-10-18 PROCEDURE — 83605 ASSAY OF LACTIC ACID: CPT

## 2020-10-18 PROCEDURE — 82962 GLUCOSE BLOOD TEST: CPT

## 2020-10-18 PROCEDURE — 2580000003 HC RX 258: Performed by: INTERNAL MEDICINE

## 2020-10-18 PROCEDURE — 82570 ASSAY OF URINE CREATININE: CPT

## 2020-10-18 PROCEDURE — 99255 IP/OBS CONSLTJ NEW/EST HI 80: CPT | Performed by: INTERNAL MEDICINE

## 2020-10-18 PROCEDURE — 82436 ASSAY OF URINE CHLORIDE: CPT

## 2020-10-18 PROCEDURE — 84540 ASSAY OF URINE/UREA-N: CPT

## 2020-10-18 PROCEDURE — 36556 INSERT NON-TUNNEL CV CATH: CPT

## 2020-10-18 PROCEDURE — 83615 LACTATE (LD) (LDH) ENZYME: CPT

## 2020-10-18 PROCEDURE — 83735 ASSAY OF MAGNESIUM: CPT

## 2020-10-18 PROCEDURE — 82553 CREATINE MB FRACTION: CPT

## 2020-10-18 PROCEDURE — 2700000000 HC OXYGEN THERAPY PER DAY

## 2020-10-18 PROCEDURE — 82728 ASSAY OF FERRITIN: CPT

## 2020-10-18 PROCEDURE — 2000000000 HC ICU R&B

## 2020-10-18 PROCEDURE — 85378 FIBRIN DEGRADE SEMIQUANT: CPT

## 2020-10-18 PROCEDURE — 84484 ASSAY OF TROPONIN QUANT: CPT

## 2020-10-18 PROCEDURE — 6360000002 HC RX W HCPCS: Performed by: EMERGENCY MEDICINE

## 2020-10-18 PROCEDURE — 84133 ASSAY OF URINE POTASSIUM: CPT

## 2020-10-18 RX ORDER — 0.9 % SODIUM CHLORIDE 0.9 %
500 INTRAVENOUS SOLUTION INTRAVENOUS ONCE
Status: COMPLETED | OUTPATIENT
Start: 2020-10-18 | End: 2020-10-18

## 2020-10-18 RX ORDER — 0.9 % SODIUM CHLORIDE 0.9 %
1000 INTRAVENOUS SOLUTION INTRAVENOUS ONCE
Status: COMPLETED | OUTPATIENT
Start: 2020-10-18 | End: 2020-10-18

## 2020-10-18 RX ORDER — DEXTROSE MONOHYDRATE 25 G/50ML
12.5 INJECTION, SOLUTION INTRAVENOUS PRN
Status: DISCONTINUED | OUTPATIENT
Start: 2020-10-18 | End: 2020-10-19 | Stop reason: SDUPTHER

## 2020-10-18 RX ORDER — MORPHINE SULFATE 2 MG/ML
2 INJECTION, SOLUTION INTRAMUSCULAR; INTRAVENOUS ONCE
Status: COMPLETED | OUTPATIENT
Start: 2020-10-18 | End: 2020-10-18

## 2020-10-18 RX ORDER — 0.9 % SODIUM CHLORIDE 0.9 %
1000 INTRAVENOUS SOLUTION INTRAVENOUS ONCE
Status: DISCONTINUED | OUTPATIENT
Start: 2020-10-18 | End: 2020-10-18

## 2020-10-18 RX ORDER — ONDANSETRON 2 MG/ML
4 INJECTION INTRAMUSCULAR; INTRAVENOUS EVERY 6 HOURS PRN
Status: DISCONTINUED | OUTPATIENT
Start: 2020-10-18 | End: 2020-10-20 | Stop reason: HOSPADM

## 2020-10-18 RX ORDER — DEXTROSE AND SODIUM CHLORIDE 5; .45 G/100ML; G/100ML
INJECTION, SOLUTION INTRAVENOUS CONTINUOUS PRN
Status: DISCONTINUED | OUTPATIENT
Start: 2020-10-18 | End: 2020-10-19

## 2020-10-18 RX ORDER — SODIUM CHLORIDE 450 MG/100ML
INJECTION, SOLUTION INTRAVENOUS CONTINUOUS
Status: DISCONTINUED | OUTPATIENT
Start: 2020-10-18 | End: 2020-10-19

## 2020-10-18 RX ORDER — KETOROLAC TROMETHAMINE 30 MG/ML
15 INJECTION, SOLUTION INTRAMUSCULAR; INTRAVENOUS ONCE
Status: COMPLETED | OUTPATIENT
Start: 2020-10-18 | End: 2020-10-18

## 2020-10-18 RX ORDER — POTASSIUM CHLORIDE 29.8 MG/ML
20 INJECTION INTRAVENOUS PRN
Status: DISCONTINUED | OUTPATIENT
Start: 2020-10-18 | End: 2020-10-19

## 2020-10-18 RX ORDER — INSULIN GLARGINE 100 [IU]/ML
13 INJECTION, SOLUTION SUBCUTANEOUS NIGHTLY
Status: DISCONTINUED | OUTPATIENT
Start: 2020-10-18 | End: 2020-10-19

## 2020-10-18 RX ADMIN — KETOROLAC TROMETHAMINE 15 MG: 30 INJECTION, SOLUTION INTRAMUSCULAR at 20:16

## 2020-10-18 RX ADMIN — SODIUM CHLORIDE: 9 INJECTION, SOLUTION INTRAVENOUS at 01:53

## 2020-10-18 RX ADMIN — POTASSIUM CHLORIDE 20 MEQ: 400 INJECTION, SOLUTION INTRAVENOUS at 14:15

## 2020-10-18 RX ADMIN — POTASSIUM CHLORIDE 20 MEQ: 400 INJECTION, SOLUTION INTRAVENOUS at 11:24

## 2020-10-18 RX ADMIN — MORPHINE SULFATE 2 MG: 2 INJECTION, SOLUTION INTRAMUSCULAR; INTRAVENOUS at 04:15

## 2020-10-18 RX ADMIN — SODIUM CHLORIDE: 4.5 INJECTION, SOLUTION INTRAVENOUS at 11:28

## 2020-10-18 RX ADMIN — SODIUM CHLORIDE 1000 ML: 9 INJECTION, SOLUTION INTRAVENOUS at 10:57

## 2020-10-18 RX ADMIN — POTASSIUM CHLORIDE 20 MEQ: 400 INJECTION, SOLUTION INTRAVENOUS at 18:25

## 2020-10-18 RX ADMIN — SODIUM PHOSPHATE, MONOBASIC, MONOHYDRATE 10 MMOL: 276; 142 INJECTION, SOLUTION INTRAVENOUS at 22:36

## 2020-10-18 RX ADMIN — SODIUM CHLORIDE 0.1 UNITS/KG/HR: 9 INJECTION, SOLUTION INTRAVENOUS at 03:53

## 2020-10-18 RX ADMIN — DEXTROSE AND SODIUM CHLORIDE: 5; 450 INJECTION, SOLUTION INTRAVENOUS at 19:39

## 2020-10-18 RX ADMIN — POTASSIUM CHLORIDE 20 MEQ: 400 INJECTION, SOLUTION INTRAVENOUS at 12:14

## 2020-10-18 RX ADMIN — ONDANSETRON 4 MG: 2 INJECTION INTRAMUSCULAR; INTRAVENOUS at 11:36

## 2020-10-18 RX ADMIN — SODIUM CHLORIDE 0.1 UNITS/KG/HR: 9 INJECTION, SOLUTION INTRAVENOUS at 01:47

## 2020-10-18 RX ADMIN — POTASSIUM CHLORIDE 20 MEQ: 400 INJECTION, SOLUTION INTRAVENOUS at 21:28

## 2020-10-18 RX ADMIN — SODIUM CHLORIDE 500 ML: 9 INJECTION, SOLUTION INTRAVENOUS at 16:18

## 2020-10-18 RX ADMIN — SODIUM CHLORIDE: 4.5 INJECTION, SOLUTION INTRAVENOUS at 07:50

## 2020-10-18 RX ADMIN — SODIUM CHLORIDE 629 ML: 9 INJECTION, SOLUTION INTRAVENOUS at 01:16

## 2020-10-18 RX ADMIN — DEXTROSE AND SODIUM CHLORIDE: 5; 450 INJECTION, SOLUTION INTRAVENOUS at 12:24

## 2020-10-18 RX ADMIN — SODIUM CHLORIDE: 4.5 INJECTION, SOLUTION INTRAVENOUS at 03:22

## 2020-10-18 RX ADMIN — SODIUM BICARBONATE 50 MEQ: 84 INJECTION INTRAVENOUS at 01:15

## 2020-10-18 RX ADMIN — CALCIUM GLUCONATE 2 G: 98 INJECTION, SOLUTION INTRAVENOUS at 00:45

## 2020-10-18 RX ADMIN — INSULIN GLARGINE 13 UNITS: 100 INJECTION, SOLUTION SUBCUTANEOUS at 22:55

## 2020-10-18 ASSESSMENT — PAIN DESCRIPTION - ONSET
ONSET: ON-GOING
ONSET: UNABLE TO TELL

## 2020-10-18 ASSESSMENT — PAIN DESCRIPTION - LOCATION
LOCATION: ABDOMEN;BACK
LOCATION: ABDOMEN;BACK

## 2020-10-18 ASSESSMENT — PAIN DESCRIPTION - PROGRESSION
CLINICAL_PROGRESSION: GRADUALLY WORSENING
CLINICAL_PROGRESSION: NOT CHANGED

## 2020-10-18 ASSESSMENT — PAIN DESCRIPTION - DESCRIPTORS
DESCRIPTORS: ACHING;CONSTANT
DESCRIPTORS: ACHING;CONSTANT

## 2020-10-18 ASSESSMENT — PAIN DESCRIPTION - FREQUENCY
FREQUENCY: CONTINUOUS
FREQUENCY: CONTINUOUS

## 2020-10-18 ASSESSMENT — PAIN DESCRIPTION - PAIN TYPE
TYPE: ACUTE PAIN
TYPE: ACUTE PAIN

## 2020-10-18 ASSESSMENT — ENCOUNTER SYMPTOMS
NAUSEA: 1
VOMITING: 1
ABDOMINAL PAIN: 0

## 2020-10-18 ASSESSMENT — PAIN SCALES - GENERAL
PAINLEVEL_OUTOF10: 10
PAINLEVEL_OUTOF10: 0
PAINLEVEL_OUTOF10: 0

## 2020-10-18 ASSESSMENT — PAIN DESCRIPTION - ORIENTATION
ORIENTATION: LOWER
ORIENTATION: LOWER

## 2020-10-18 NOTE — PROGRESS NOTES
Hospital Medicine Progress Note      Date of Admission: 10/17/2020  Hospital day # 0    Course: Follow-up on DKA    Subjective    Patient seen in ICU  No complaints at the evaluation  Stable overnight. No other overnight issues reported. Exam:    BP (!) 98/55   Pulse 128   Temp 97.1 °F (36.2 °C)   Resp 8   Ht 5' 1\" (1.549 m)   Wt 92 lb 6 oz (41.9 kg)   LMP 05/17/2020   SpO2 100%   BMI 17.45 kg/m²     General appearance: Acutely ill, appears stated age and cooperative. HEENT: Pupils equal, round, and reactive to light. Conjunctivae/corneas clear. Neck: Supple. No jugular venous distention. Trachea midline. Respiratory:  Normal respiratory effort. Clear to auscultation, bilaterally without Rales/Wheezes/Rhonchi. Cardiovascular:  S1/S2   Abdomen: Soft, non-tender, non-distended with normal bowel sounds. Musculoskeletal: No clubbing, cyanosis or edema bilaterally.   Skin:  No rashes    Neurologic: awake, alert and following commands     Medications:  Reviewed    Infusion Medications    sodium chloride 250 mL/hr at 10/18/20 0750    dextrose 5 % and 0.45 % NaCl      insulin 2 Units/hr (10/18/20 1002)    sodium chloride Stopped (10/18/20 0321)     Scheduled Medications    enoxaparin  40 mg Subcutaneous Daily    sodium chloride  1,000 mL Intravenous Once     PRN Meds: dextrose, dextrose 5 % and 0.45 % NaCl, potassium chloride, magnesium sulfate, sodium phosphate IVPB **OR** sodium phosphate IVPB **OR** sodium phosphate IVPB    I/O    Intake/Output Summary (Last 24 hours) at 10/18/2020 1034  Last data filed at 10/18/2020 1343  Gross per 24 hour   Intake 2147 ml   Output 750 ml   Net 1397 ml       Labs:   Recent Labs     10/17/20  2235   WBC 17.0*   HGB 10.5*   HCT 34.1          Recent Labs     10/17/20  2235 10/17/20  2255 10/18/20  0338 10/18/20  0841   *  --  136 138   K 6.4*  --  5.2* 4.0   CL 81*  --  94* 101   CO2 6*  --  5* 16*   BUN 34*  --  31* 28*   CREATININE 1.0 1.2* 0.9 0.8

## 2020-10-18 NOTE — HOME CARE
Patient current with [facility] for SN. Will need NAYELI orders if appropriate at discharge. Sofia Kelley LPN  [facility].

## 2020-10-18 NOTE — H&P
Hospital Medicine History & Physical      PCP: Eder Cosme DO    Date of Admission: 10/17/2020    Date of Service: Pt seen/examined on 10/18 and Admitted to Inpatient with expected LOS greater than two midnights due to medical therapy. Chief Complaint: Hyperglycemia, nausea and vomiting      History Of Present Illness:     27 y.o. female who presented to [address] [place] [address] with past medical history of diabetes, states that she started feeling ill approximately a day and a half ago. She said she has been using her insulin, but she is unable to keep down solids or liquids. She vomits every time she eats or drinks. She also has pain in her lumbar spine, and pain in her epigastric area. She thinks the epigastric pain is from vomiting. There is no blood in the vomitus, and she has no fever or chills. She is a known type I diabetic. Past Medical History:          Diagnosis Date    Bleeding at insertion site 1/5/2018    Common femoral artery injury, right, initial encounter 1/5/2018    Depressive disorder     DM type 1 (diabetes mellitus, type 1) ([facility]        Past Surgical History:          Procedure Laterality Date    ABDOMEN SURGERY N/A 5/9/2019    INCISION AND DRAINAGE OF SUPRAPUBIC ABSESS performed by Fred Canas MD at [address] [place] [address]      last yr       Medications Prior to Admission:      Prior to Admission medications    Medication Sig Start Date End Date Taking?  Authorizing Provider   insulin aspart (NOVOLOG) 100 UNIT/ML injection vial Use this scale until restarting insulin pump in the AM  TID with meals  - No insulin  140-199 1 unit  200-249 2 units  250-299- 3 units  300-349 4 units  350-399 5 units  Over 399 6 units    Night time    no insulin  140-249 1 unit  250-349 2 units  Over 350 3 unit 10/13/20   Delores Campos MD   insulin aspart (NOVOLOG) 100 UNIT/ML injection vial Per pump- restart pump tomorrow morning 10/13/20   Ismael Gonzales Ana Castro MD   docusate sodium (COLACE, DULCOLAX) 100 MG CAPS Take 100 mg by mouth daily Hold for diarrhea 10/14/20   Stephon Hawley DO   mirtazapine (REMERON) 15 MG tablet Take 1 tablet by mouth nightly 10/12/20   France Frank MD   ketorolac (TORADOL) 10 MG tablet Take 1 tablet by mouth every 6 hours as needed for Pain 10/10/20 10/14/20  Juliano Guzman MD   naproxen (NAPROSYN) 250 MG tablet Take 1 tablet by mouth 2 times daily (with meals) 10/7/20   Jacey Flores DO   metoclopramide (REGLAN) 10 MG tablet Take 1 tablet by mouth 4 times daily 10/7/20   Brittanie Polanco DO   blood glucose test strips (CONTOUR NEXT TEST) strip Andre Phillipe Contour test strips. Checks 4 times/day before meals and at bedtime and as needed for symptoms of irregular blood glucose 9/23/20   Taty Carter MD   pantoprazole (PROTONIX) 40 MG tablet Take 1 tablet by mouth 2 times daily (before meals) 9/4/20 10/4/20  Roman Christine MD   blood glucose test strips (CONTOUR NEXT TEST) strip Neelima Contour test strips. Checks 4 times/day before meals and at bedtime and as needed for symptoms of irregular blood glucose 8/12/20   Taty Carter MD   doxepin (ZONALON) 5 % cream APPLY ONE GRAM (ONE PUMP) EXTERNALLY THREE TIMES A DAY TO PAIN AREA TO LOWER BACK  8/10/20   KENA Cano   [facility]) GEL APPLY ONE GRAM (ONE PUMP) EXTERNALLY FOUR TIMES A DAY TO LOWER BACK  8/10/20   KENA Cano   acetone, urine, test strip Use daily as directed if bs >250 x2 or illness 5/7/20   MIKE Parish - CNS       Allergies:  Patient has no known allergies. Social History:      The patient currently lives independently    TOBACCO:   reports that she has never smoked. She has never used smokeless tobacco.  ETOH:   reports no history of alcohol use. Family History:      Reviewed in detail and negative for DM, CAD, Cancer, CVA.  Positive as follows:        Problem Relation Age of Onset    Diabetes Maternal Grandmother  Diabetes Paternal Grandmother     Stroke Other     Thyroid Disease Neg Hx        REVIEW OF SYSTEMS:   Pertinent positives as noted in the HPI. All other systems reviewed and negative. PHYSICAL EXAM:    /71   Pulse 102   Temp 98 °F (36.7 °C) (Oral)   Resp 22   Ht 5' 1\" (1.549 m)   Wt 92 lb 6 oz (41.9 kg)   LMP 05/17/2020   SpO2 98%   BMI 17.45 kg/m²     General appearance:  No apparent distress, appears stated age and cooperative. HEENT:  Normal cephalic, atraumatic without obvious deformity. Pupils equal, round, and reactive to light. Extra ocular muscles intact. Conjunctivae/corneas clear. Neck: Supple, with full range of motion. No jugular venous distention. Trachea midline. Respiratory:  Normal respiratory effort. Clear to auscultation, bilaterally without Rales/Wheezes/Rhonchi. Cardiovascular:  Regular rate and rhythm with normal S1/S2 without murmurs, rubs or gallops. Abdomen: Soft, non-tender, non-distended with normal bowel sounds. Musculoskeletal:  No clubbing, cyanosis or edema bilaterally. Full range of motion without deformity. Skin: Skin color, texture, turgor normal.  No rashes or lesions. Neurologic:  Neurovascularly intact without any focal sensory/motor deficits. Cranial nerves: II-XII intact, grossly non-focal.  Psychiatric:  Alert and oriented, thought content appropriate, normal insight  Capillary Refill: Brisk,< 3 seconds   Peripheral Pulses: +2 palpable, equal bilaterally         Labs:     Recent Labs     10/17/20  2235   WBC 17.0*   HGB 10.5*   HCT 34.1        Recent Labs     10/17/20  2235 10/17/20  2255   *  --    K 6.4*  --    CL 81*  --    CO2 6*  --    BUN 34*  --    CREATININE 1.0 1.2*   CALCIUM 9.7  --    PHOS 9.9*  --      Recent Labs     10/17/20  2235   AST 20   ALT 26   BILITOT 0.3   ALKPHOS 71     No results for input(s): INR in the last 72 hours. No results for input(s): Dominique Pace in the last 72 hours.     Urinalysis:      Lab Results   Component Value Date    NITRU Negative 10/08/2020    WBCUA 0-1 10/08/2020    BACTERIA FEW 10/08/2020    RBCUA NONE 10/08/2020    BLOODU Negative 10/08/2020    SPECGRAV 1.020 10/08/2020    GLUCOSEU >=1000 10/08/2020         ASSESSMENT:    Active Hospital Problems    Diagnosis Date Noted    DKA, type 1, not at goal [facility]) [E10.10] 08/31/2020   Hyperkalemia  Nausea and vomiting    PLAN:  Hydrate the patient. Check sugars every hour and basic metabolic panel every 4 hours  Patient is receiving insulin and calcium in the ER. Admit to the ICU    DVT Prophylaxis: As per critical care  Diet: Diet NPO Effective Now  Code Status: Prior    PT/OT Eval Status: N/A    Dispo -goal home       Andres William DO    Thank you Maribell Silverman DO for the opportunity to be involved in this patient's care. If you have any questions or concerns please feel free to contact me at [phone].

## 2020-10-18 NOTE — PROCEDURES
Central Line Placement Procedure Note    Indication: vascular access, hypovolemia, centrally administered medications and need for frequent blood draws    Consent: The patient provided verbal consent for this procedure. Procedure: The patient was positioned appropriately and the skin over the left internal jugular vein was prepped with betadine and draped in a sterile fashion. Local anesthesia was obtained by infiltration using 1% Lidocaine with epinephrine. A large bore needle was used to identify the vein. A guide wire was then inserted into the vein through the needle. A triple lumen catheter was then inserted into the vessel over the guide wire using the Seldinger technique. All ports showed good, free flowing blood return and were flushed with saline solution. The catheter was then securely fastened to the skin with suture at 16 cm. Two sutures were placed into the kit included tube clamp, proximal eyelets and a suture end from each of the securing sutures was extended around the catheter and tied to the proximal eyelets as an added measure to prevent dislodgement. An antibiotic disk was placed and the site was then covered with a sterile dressing. A post procedure X-ray was ordered and is still pending at this time. The patient tolerated the procedure well.     Complications: None

## 2020-10-18 NOTE — ED PROVIDER NOTES
82-year-old female presenting with complaints of throwing up, concerned that she is in DKA again, she is having nausea, also concerned about Covid exposure. She reports that she was Covid positive about 6 days ago. Was recently admitted to [facility] intensive care unit for DKA. Was discharged and reports that she is in DKA again. This has been gradual in onset, worsening, severe, ongoing for couple of days, associated with nausea and concern for Covid. Questionable compliance at home is associated with this as well. Nothing makes it better, try to eat or drink makes it worse because of the nausea. Family History   Problem Relation Age of Onset    Diabetes Maternal Grandmother     Diabetes Paternal Grandmother     Stroke Other     Thyroid Disease Neg Hx      Past Surgical History:   Procedure Laterality Date    ABDOMEN SURGERY N/A 5/9/2019    INCISION AND DRAINAGE OF SUPRAPUBIC ABSESS performed by Ricardo Muir MD at [address] [facility] [facility] [address]      last yr       Review of Systems   Constitutional: Negative for chills and fever. Gastrointestinal: Positive for nausea and vomiting. Negative for abdominal pain. Endocrine: Positive for polydipsia and polyuria. Musculoskeletal: Positive for myalgias. All other systems reviewed and are negative. Physical Exam  Constitutional:       General: She is not in acute distress. Appearance: She is well-developed. She is ill-appearing. Comments: Petite   HENT:      Head: Normocephalic and atraumatic. Eyes:      Conjunctiva/sclera: Conjunctivae normal.      Pupils: Pupils are equal, round, and reactive to light. Neck:      Musculoskeletal: Normal range of motion. Thyroid: No thyromegaly. Cardiovascular:      Rate and Rhythm: Regular rhythm. Tachycardia present. Pulmonary:      Effort: No respiratory distress. Breath sounds: No stridor.       Comments: Some tachypnea  Chest:      Chest wall: No tenderness. Abdominal:      General: There is no distension. Palpations: Abdomen is soft. Tenderness: There is no abdominal tenderness. There is no guarding or rebound. Musculoskeletal: Normal range of motion. General: No tenderness. Skin:     General: Skin is warm and dry. Findings: No erythema. Neurological:      Mental Status: She is alert and oriented to person, place, and time. Cranial Nerves: No cranial nerve deficit. Coordination: Coordination normal.          Procedures     MDM     ED Course as of Oct 18 0022   [facility] Oct 18, 2020   0016 Spoke with Dr. Jaison Wright, will admit the patient.    [SO]   Joleen Lombardo with Dr. Lilo Liu who will accept the patient to the intensive care unit. [SO]      ED Course User Index  [SO] Jake Hurst DO      ED Course as of Oct 18 0022   [facility] Oct 18, 2020   4370 Spoke with Dr. Jaison Wright, will admit the patient.    [SO]   Joleen Lombardo with Dr. Lilo Liu who will accept the patient to the intensive care unit. [SO]      ED Course User Index  [SO] Jake Hurst DO       --------------------------------------------- PAST HISTORY ---------------------------------------------  Past Medical History:  has a past medical history of Bleeding at insertion site, Common femoral artery injury, right, initial encounter, Depressive disorder, and DM type 1 [facility]di[facility] Past Surgical History:  has a past surgical history that includes Abdomen surgery (N/A, 5/9/2019) and Bartholin gland cyst excision. Social History:  reports that she has never smoked. She has never used smokeless tobacco. She reports that she does not drink alcohol or use drugs. Family History: family history includes Diabetes in her maternal grandmother and paternal grandmother; Stroke in an other family member. The patients home medications have been reviewed. Allergies: Patient has no known allergies.     -------------------------------------------------- RESULTS -------------------------------------------------    Lab  Results for orders placed or performed during the hospital encounter of 10/17/20   CBC   Result Value Ref Range    WBC 17.0 (H) 4.5 - 11.5 E9/L    RBC 3.83 3.50 - 5.50 E12/L    Hemoglobin 10.5 (L) 11.5 - 15.5 g/dL    Hematocrit 34.1 34.0 - 48.0 %    MCV 89.0 80.0 - 99.9 fL    MCH 27.4 26.0 - 35.0 pg    MCHC 30.8 (L) 32.0 - 34.5 %    RDW 13.8 11.5 - 15.0 fL    Platelets 383 224 - 670 E9/L    MPV 11.5 7.0 - 12.0 fL   Comprehensive metabolic panel   Result Value Ref Range    Sodium 129 (L) 132 - 146 mmol/L    Potassium 6.4 (H) 3.5 - 5.0 mmol/L    Chloride 81 (L) 98 - 107 mmol/L    CO2 6 (LL) 22 - 29 mmol/L    Anion Gap 42 (H) 7 - 16 mmol/L    Glucose 1,059 (HH) 74 - 99 mg/dL    BUN 34 (H) 6 - 20 mg/dL    CREATININE 1.0 0.5 - 1.0 mg/dL    GFR Non-African American >60 >=60 mL/min/1.73    GFR African American >60     Calcium 9.7 8.6 - 10.2 mg/dL    Total Protein 7.0 6.4 - 8.3 g/dL    Alb 4.0 3.5 - 5.2 g/dL    Total Bilirubin 0.3 0.0 - 1.2 mg/dL    Alkaline Phosphatase 71 35 - 104 U/L    ALT 26 0 - 32 U/L    AST 20 0 - 31 U/L   Magnesium   Result Value Ref Range    Magnesium 2.6 1.6 - 2.6 mg/dL   Beta-Hydroxybutyrate   Result Value Ref Range    Beta-Hydroxybutyrate >4.50 (H) 0.02 - 0.27 mmol/L   Lipase   Result Value Ref Range    Lipase 7 (L) 13 - 60 U/L   Serum Drug Screen   Result Value Ref Range    Ethanol Lvl <10 mg/dL    Acetaminophen Level <5.0 (L) 10.0 - 14.9 mcg/mL    Salicylate, Serum <3.8 0.0 - 30.0 mg/dL    TCA Scrn NEGATIVE Cutoff:300 ng/mL   Phosphorus   Result Value Ref Range    Phosphorus 9.9 (HH) 2.5 - 4.5 mg/dL   COVID-19   Result Value Ref Range    SARS-CoV-2, NAAT Not Detected Not Detected   PH, VENOUS   Result Value Ref Range    pH, Christoph 7.17 (LL) 7.35 - 7.45   POCT Venous   Result Value Ref Range    POC Sodium 124 (L) 132 - 146 mmol/L    POC Potassium 6.3 (H) 3.5 - 5.0 mmol/L    POC Chloride 101 100 - 108 mmol/L    POC Glucose >700 (HH) 74 - 99 mg/dl    POC Creatinine 1.2 (H) 0.5 - 1.0 mg/dL    GFR Non-African American 56 >=60 mL/min/1.73    GFR  >60     Performed on SEE BELOW        Radiology  XR CHEST PORTABLE   Final Result   Left IJ line with distal tip in the SVC. No pneumothorax. No acute cardiopulmonary disease.               ------------------------- NURSING NOTES AND VITALS REVIEWED ---------------------------  Date / Time Roomed:  10/17/2020  8:18 PM  ED Bed Assignment:  40/40    The nursing notes within the ED encounter and vital signs as below have been reviewed. Patient Vitals for the past 24 hrs:   BP Temp Temp src Pulse Resp SpO2 Height Weight   10/18/20 0001 -- -- -- -- -- -- 5' 1\" (1.549 m) 92 lb 6 oz (41.9 kg)   10/17/20 2035 106/71 98 °F (36.7 °C) Oral 102 22 98 % -- --       Oxygen Saturation Interpretation: Normal    Central Line Placement Procedure Note     Indication: vascular access, hypovolemia, centrally administered medications and need for frequent blood draws     Consent: The patient provided verbal consent for this procedure.     Procedure: The patient was positioned appropriately and the skin over the left internal jugular vein was prepped with betadine and draped in a sterile fashion. Local anesthesia was obtained by infiltration using 1% Lidocaine with epinephrine. A large bore needle was used to identify the vein. A guide wire was then inserted into the vein through the needle. A triple lumen catheter was then inserted into the vessel over the guide wire using the Seldinger technique. All ports showed good, free flowing blood return and were flushed with saline solution. The catheter was then securely fastened to the skin with suture at 16 cm. Two sutures were placed into the kit included tube clamp, proximal eyelets and a suture end from each of the securing sutures was extended around the catheter and tied to the proximal eyelets as an added measure to prevent dislodgement.  An antibiotic disk was placed and the site was then covered with a sterile dressing. A post procedure X-ray was ordered and is still pending at this time.     The patient tolerated the procedure well.     Complications: None    ------------------------------------------ PROGRESS NOTES ------------------------------------------  Re-evaluation(s):  Time: 9017. Patients symptoms are improving  Repeat physical examination is improved    Time: 2215. Patients symptoms show no change  Repeat physical examination is not changed    I have spoken with the patient and discussed todays results, in addition to providing specific details for the plan of care and counseling regarding the diagnosis and prognosis. Their questions are answered at this time and they are agreeable with the plan.      --------------------------------- ADDITIONAL PROVIDER NOTES ---------------------------------  Consultations:  Spoke with Dr. Ofelia Corey,  They will admit this patient. Dr. Becca Linton, will accept to the MICU. This patient's ED course included: a personal history and physicial examination, re-evaluation prior to disposition, multiple bedside re-evaluations, IV medications, cardiac monitoring, continuous pulse oximetry and complex medical decision making and emergency management    This patient has remained hemodynamically stable, improved and been closely monitored during their ED course. Please note that the withdrawal or failure to initiate urgent interventions for this patient would likely result in a life threatening deterioration or permanent disability. Accordingly this patient received 45 minutes of critical care time, excluding separately billable procedures. Systems at risk for deterioration include: neurologic, cardiovascular. Clinical Impression  1.  Diabetic ketoacidosis without coma associated with type 1 diabetes mellitus [facility]          Disposition  Patient's disposition: Admit to CCU/ICU  Patient's condition is stable.           Janiya Listen, DO  10/18/20 1239

## 2020-10-18 NOTE — CONSULTS
[facility]  Internal Medicine Residency Program  CONSULT MICU    Patient:  Murali Duggan 27 y.o. female MRN: [id number]     Date of Service: 10/18/2020    Hospital Day: 2      Chief complaint: N/V, diarrhea x 2 days   History of Present Illness   The patient is a 27 y.o. female with history of uncontrolled type 1 DM and multiple hospital admissions for DKA, with known history of noncompliance related to diet and . Patient was admitted to [facility] 10/11-10/15 for weakness and visual changes; admitted to [facility] ICU for DKA management. Patient complains of worsening abdominal pain, vomiting, and diarrhea since discharge. Upon discharge, patient was on Lantus 13 U daily, LDSS, and 4U premeal. Insulin pump started this August. Per the patient, she was not restarted on the pump from this last admission and has only been on the sliding scale. Patient was admitted to [facility] 10/8-10/9 for DKA/uncontrolled type I DM and treated with insulin gtt with improvement and transition to subcu insulin. Patient was also found to be Covid positive during admission on 10/8/2020, without treatment. Patient restarted insulin pump and discharged home. ED Course:   Afebrile, tachypneic (22), tachycardia (102), 106/71, 98% on room air. Hyponatremia (129), potassium 6.4, chloride 81, bicarb 6, anion gap 42, glucose 1059, phosphorus 9.9, beta hydroxy greater than 4.50, urine drug screen positive for opiates, Covid negative, UA positive ketones and glucose, venous pH 7.17    ED Meds: Patient was given 1 amp sodium bicarb, 2g calcium gluconate, morphine 1 mg, Zofran 4 mg. ED Fluids: Patient was given 1L NS.     Past Medical History:      Diagnosis Date    Bleeding at insertion site 1/5/2018    Common femoral artery injury, right, initial encounter 1/5/2018    Depressive disorder     DM type 1 (diabetes mellitus, type 1) ([facility] [address]        Past Surgical History:        Procedure Laterality Date    ABDOMEN SURGERY N/A 5/9/2019    INCISION AND DRAINAGE OF SUPRAPUBIC ABSESS performed by Alexander Neal MD at [facility]      last yr       Medications Prior to Admission:    Prior to Admission medications    Medication Sig Start Date End Date Taking? Authorizing Provider   insulin aspart (NOVOLOG) 100 UNIT/ML injection vial Use this scale until restarting insulin pump in the AM  TID with meals  - No insulin  140-199 1 unit  200-249 2 units  250-299- 3 units  300-349 4 units  350-399 5 units  Over 399 6 units    Night time    no insulin  140-249 1 unit  250-349 2 units  Over 350 3 unit 10/13/20   Corry Jay MD   insulin aspart (NOVOLOG) 100 UNIT/ML injection vial Per pump- restart pump tomorrow morning 10/13/20   Corry aJy MD   docusate sodium (COLACE, DULCOLAX) 100 MG CAPS Take 100 mg by mouth daily Hold for diarrhea 10/14/20   Stephon Hawley,    mirtazapine (REMERON) 15 MG tablet Take 1 tablet by mouth nightly 10/12/20   Shaylee Rock MD   ketorolac (TORADOL) 10 MG tablet Take 1 tablet by mouth every 6 hours as needed for Pain 10/10/20 10/14/20  Emely Cooper MD   naproxen (NAPROSYN) 250 MG tablet Take 1 tablet by mouth 2 times daily (with meals) 10/7/20   Mariano Mccoy,    metoclopramide (REGLAN) 10 MG tablet Take 1 tablet by mouth 4 times daily 10/7/20   Lefty Polanco DO   blood glucose test strips (CONTOUR NEXT TEST) strip Neelima Contour test strips. Checks 4 times/day before meals and at bedtime and as needed for symptoms of irregular blood glucose 9/23/20   Harris Holloway MD   pantoprazole (PROTONIX) 40 MG tablet Take 1 tablet by mouth 2 times daily (before meals) 9/4/20 10/4/20  Molly Medrano MD   blood glucose test strips (CONTOUR NEXT TEST) strip Neelima Contour test strips.  Checks 4 times/day before meals and at bedtime and as needed for symptoms of irregular blood glucose 8/12/20   Harris Holloway MD   doxepin (ZONALON) 5 % cream APPLY ONE GRAM (ONE PUMP) EXTERNALLY THREE TIMES A DAY TO PAIN AREA TO LOWER BACK  8/10/20   KENA Villafana Junior   [facility] SYSTEM) GEL APPLY ONE GRAM (ONE PUMP) EXTERNALLY FOUR TIMES A DAY TO LOWER BACK  8/10/20   KENA Villafana Junior   acetone, urine, test strip Use daily as directed if bs >250 x2 or illness 5/7/20   MIKE Magana - CNS       Allergies:  Patient has no known allergies. Social History:   TOBACCO:   reports that she has never smoked. She has never used smokeless tobacco.  ETOH:   reports no history of alcohol use. Family History:       Problem Relation Age of Onset    Diabetes Maternal Grandmother     Diabetes Paternal Grandmother     Stroke Other     Thyroid Disease Neg Hx        REVIEW OF SYSTEMS:    · Constitutional: No fever, no chills, no change in weight; HEENT: No blurred vision, no ear problems, no sore throat, no rhinorrhea. · Respiratory: No cough, no sputum production, no pleuritic chest pain, no shortness of breath  · Cardiology: No angina, no dyspnea on exertion, no paroxysmal nocturnal dyspnea, no orthopnea, no palpitation, no leg swelling. · Gastroenterology: No dysphagia, no reflux; + abdominal pain, + nausea or vomiting; no constipation. + diarrhea.  No hematochezia   · Genitourinary: No dysuria, no frequency, hesitancy; no hematuria  · Musculoskeletal: no joint pain, no myalgia, no change in range of movement  · Neurology: no focal weakness in extremities, no slurred speech, + double vision, no tingling or numbness sensation  · Endocrinology: no temperature intolerance, no polyphagia, polydipsia or polyuria  · Hematology: no increased bleeding, no bruising, no lymphadenopathy  · Skin: no skin changes noticed by patient  · Psychology: no depressed mood, no suicidal ideation    Physical Exam   · Vitals: /71   Pulse 102   Temp 98 °F (36.7 °C) (Oral)   Resp 22   Ht 5' 1\" (1.549 m)   Wt 92 lb 6 oz (41.9 kg)   LMP 05/17/2020   SpO2 98%   BMI 17.45 kg/m²   ·    ·      · General Appearance: alert and oriented to person, place and time, well developed and well- nourished, in no acute distress  · Skin: warm and dry, no rash or erythema  · Head: normocephalic and atraumatic  · Eyes: pupils equal, round, and reactive to light, extraocular eye movements intact, conjunctivae normal  · Neck: supple   · Pulmonary/Chest: clear to auscultation bilaterally- no wheezes, rales or rhonchi, normal air movement, no respiratory distress  · Cardiovascular: normal rate, regular rhythm, normal S1 and S2, no murmurs, rubs, clicks, or gallops, distal pulses intact, no carotid bruits  · Abdomen: soft, non-tender, non-distended, normal bowel sounds, no masses or organomegaly  · Extremities: no cyanosis, clubbing or edema  · Musculoskeletal: normal range of motion, no joint swelling, deformity or tenderness  · Neurologic: reflexes normal and symmetric, no cranial nerve deficit, and speech normal     Lines     site day    Art line   None    TLC None    PICC L Arm    Hemoaccess None    Oxygen:    nasal canula at 3L/min  ABG:     Lab Results   Component Value Date    PH 7.395 05/31/2020    PCO2 37.7 05/31/2020    PO2 50.7 05/31/2020    HCO3 22.6 05/31/2020    BE -2.0 05/31/2020    THB 12.2 05/31/2020    O2SAT 82.6 05/31/2020     Labs and Imaging Studies   Basic Labs  CBC:   Lab Results   Component Value Date    WBC 17.0 10/17/2020    RBC 3.83 10/17/2020    HGB 10.5 10/17/2020    HCT 34.1 10/17/2020    MCV 89.0 10/17/2020    RDW 13.8 10/17/2020     10/17/2020     BMP:    Lab Results   Component Value Date     10/18/2020    K 4.0 10/18/2020    K 5.1 10/10/2020     10/18/2020    CO2 16 10/18/2020    BUN 28 10/18/2020       Imaging Studies:     Xr Thoracic Spine (3 Views)    Result Date: 10/11/2020  EXAMINATION: THREE XRAY VIEWS OF THE THORACIC SPINE 10/11/2020 10:23 pm COMPARISON: 09/01/2020 HISTORY: ORDERING SYSTEM PROVIDED HISTORY: Back pain TECHNOLOGIST PROVIDED HISTORY: Reason for exam:->Back pain What reading provider will be dictating this exam?->CRC FINDINGS: Thoracic vertebral bodies are normal in height and alignment. No significant degenerative changes. No evidence of fracture. Pedicles are symmetric and intact. Visualized lungs are clear. No acute abnormality of the thoracic spine     Xr Lumbar Spine (min 4 Views)    Result Date: 10/11/2020  EXAMINATION: TWO XRAY VIEWS OF THE RIGHT HIP; 3  XRAY VIEWS OF THE LUMBAR SPINE 10/11/2020 10:23 pm COMPARISON: None. HISTORY: ORDERING SYSTEM PROVIDED HISTORY: fall, pain, r/o fx TECHNOLOGIST PROVIDED HISTORY: Reason for exam:->fall, pain, r/o fx What reading provider will be dictating this exam?->CRC FINDINGS: Lumbar vertebral bodies are normal in height and alignment. No evidence of fracture. Visualized sacrum is unremarkable. No significant degenerative changes. Right hip is intact. No fracture. Xr Hip Right (2-3 Views)    Result Date: 10/11/2020  EXAMINATION: TWO XRAY VIEWS OF THE RIGHT HIP; 3  XRAY VIEWS OF THE LUMBAR SPINE 10/11/2020 10:23 pm COMPARISON: None. HISTORY: ORDERING SYSTEM PROVIDED HISTORY: fall, pain, r/o fx TECHNOLOGIST PROVIDED HISTORY: Reason for exam:->fall, pain, r/o fx What reading provider will be dictating this exam?->CRC FINDINGS: Lumbar vertebral bodies are normal in height and alignment. No evidence of fracture. Visualized sacrum is unremarkable. No significant degenerative changes. Right hip is intact. No fracture.      Ct Head Wo Contrast    Result Date: 10/11/2020  EXAMINATION: CT OF THE HEAD WITHOUT CONTRAST  10/11/2020 10:23 pm TECHNIQUE: CT of the head was performed without the administration of intravenous contrast. Dose modulation, iterative reconstruction, and/or weight based adjustment of the mA/kV was utilized to reduce the radiation dose to as low as reasonably achievable.; CT of the cervical spine was performed without the administration of intravenous contrast. Multiplanar reformatted images are provided for review. Dose modulation, iterative reconstruction, and/or weight based adjustment of the mA/kV was utilized to reduce the radiation dose to as low as reasonably achievable. COMPARISON: None. HISTORY: ORDERING SYSTEM PROVIDED HISTORY: Evaluate intracranial abnormality TECHNOLOGIST PROVIDED HISTORY: Has a \"code stroke\" or \"stroke alert\" been called? ->No Reason for exam:->Evaluate intracranial abnormality What reading provider will be dictating this exam?->CRC FINDINGS: BRAIN/VENTRICLES: There is no acute intracranial hemorrhage, mass effect or midline shift. No abnormal extra-axial fluid collection. The gray-white differentiation is maintained without evidence of an acute infarct. There is no evidence of hydrocephalus. ORBITS: The visualized portion of the orbits demonstrate no acute abnormality. SINUSES: The visualized paranasal sinuses and mastoid air cells demonstrate no acute abnormality. SOFT TISSUES/SKULL:  No acute abnormality of the visualized skull or soft tissues. Cervical spine: Vertebral body heights are intact. Alignment is intact. No subluxation. No significant prevertebral soft tissue swelling. No acute intracranial hemorrhage. No cervical fracture or subluxation. Ct Cervical Spine Wo Contrast    Result Date: 10/11/2020  EXAMINATION: CT OF THE HEAD WITHOUT CONTRAST  10/11/2020 10:23 pm TECHNIQUE: CT of the head was performed without the administration of intravenous contrast. Dose modulation, iterative reconstruction, and/or weight based adjustment of the mA/kV was utilized to reduce the radiation dose to as low as reasonably achievable.; CT of the cervical spine was performed without the administration of intravenous contrast. Multiplanar reformatted images are provided for review.  Dose modulation, iterative reconstruction, and/or weight based adjustment of the mA/kV was utilized to reduce the radiation dose to as low as reasonably achievable. COMPARISON: None. HISTORY: ORDERING SYSTEM PROVIDED HISTORY: Evaluate intracranial abnormality TECHNOLOGIST PROVIDED HISTORY: Has a \"code stroke\" or \"stroke alert\" been called? ->No Reason for exam:->Evaluate intracranial abnormality What reading provider will be dictating this exam?->CRC FINDINGS: BRAIN/VENTRICLES: There is no acute intracranial hemorrhage, mass effect or midline shift. No abnormal extra-axial fluid collection. The gray-white differentiation is maintained without evidence of an acute infarct. There is no evidence of hydrocephalus. ORBITS: The visualized portion of the orbits demonstrate no acute abnormality. SINUSES: The visualized paranasal sinuses and mastoid air cells demonstrate no acute abnormality. SOFT TISSUES/SKULL:  No acute abnormality of the visualized skull or soft tissues. Cervical spine: Vertebral body heights are intact. Alignment is intact. No subluxation. No significant prevertebral soft tissue swelling. No acute intracranial hemorrhage. No cervical fracture or subluxation. Xr Chest Portable    Result Date: 10/17/2020  EXAMINATION: ONE XRAY VIEW OF THE CHEST 10/17/2020 9:45 pm COMPARISON: October 12 HISTORY: ORDERING SYSTEM PROVIDED HISTORY: L IJ placement TECHNOLOGIST PROVIDED HISTORY: Reason for exam:->L IJ placement What reading provider will be dictating this exam?->CRC FINDINGS: Left IJ line with the distal tip in the SVC. No pneumothorax. Cardiomediastinal silhouette within normal limits. No airspace consolidation or pleural effusions. Pulmonary vasculature within normal limits. Left IJ line with distal tip in the SVC. No pneumothorax. No acute cardiopulmonary disease. Xr Chest Portable    Result Date: 10/12/2020  EXAMINATION: ONE XRAY VIEW OF THE CHEST 10/12/2020 8:54 am COMPARISON: 10/11/2020.  HISTORY: ORDERING SYSTEM PROVIDED HISTORY: post RIJ TECHNOLOGIST PROVIDED HISTORY: Reason for exam:->post RIJ FINDINGS: The heart size is within normal limits. The pulmonary vasculature is also within normal limits. No acute infiltrates are seen. The costophrenic angles are sharp bilaterally. No pneumothoraces are noted. There has been interval placement a right jugular central venous catheter with the tip at the cavoatrial junction. 1. Right internal jugular vein central venous line placement with the tip at the cavoatrial junction and no immediate complications. 2. No active pulmonary disease. Xr Chest Portable    Result Date: 10/11/2020  EXAMINATION: ONE XRAY VIEW OF THE CHEST 10/11/2020 10:23 pm COMPARISON: 10/10/2020 HISTORY: ORDERING SYSTEM PROVIDED HISTORY: weakness covid TECHNOLOGIST PROVIDED HISTORY: Reason for exam:->weakness covid What reading provider will be dictating this exam?->CRC FINDINGS: The lungs are without acute focal process. There is no effusion or pneumothorax. The cardiomediastinal silhouette is without acute process. The osseous structures are without acute process. No acute process. Xr Chest Portable    Result Date: 10/10/2020  EXAMINATION: ONE XRAY VIEW OF THE CHEST 10/10/2020 1:59 pm COMPARISON: October 8, 2020 HISTORY: ORDERING SYSTEM PROVIDED HISTORY: Abdominal Pain, Known COVID19 TECHNOLOGIST PROVIDED HISTORY: Reason for exam:->Abdominal Pain, Known COVID19 FINDINGS: No airspace opacity or pleural effusion. The heart is normal size. No pneumothorax. No free air beneath the hemidiaphragms. No pneumonia or pleural effusion. Xr Chest Portable    Result Date: 10/8/2020  EXAMINATION: ONE XRAY VIEW OF THE CHEST 10/8/2020 12:53 pm COMPARISON: 08/31/2020 HISTORY: ORDERING SYSTEM PROVIDED HISTORY: eval for pneumonia TECHNOLOGIST PROVIDED HISTORY: Reason for exam:->eval for pneumonia FINDINGS: The mediastinal and cardiac contours are normal.  The lungs are clear. There is no focal consolidation, pleural effusion or pneumothorax evident. The bones are unremarkable.      No acute cardiopulmonary process identified. Resident's Assessment and Plan     Love Pardo is a 27 y.o. female for DKA management. 1.  Diabetic ketoacidosis, severe hyperglycemia, pseudohyponatremia and elevated anion gap metabolic acidosis in the setting of insulin-dependent type 1 diabetes mellitus   -Blood glucose 1,059 on presentation   -Na 129 (corrected 144), K 6.4, CO2 6 on presentation   -HbA1c 13.0% on 5/30/2020, repeat today 11%   -Anion gap 42 on presentation   -NPO for now   -Holding home meds   -r/o infection, stroke, MI (pt moves all extremities)   -Hypoglycemia protocol   -Aggressive IVF with NS bolus    -Monitor Na to determine type of IVF   -Insulin infusion as per order set   -POCT glucose checks q1h until blood glucose reaches 250mg/dL for 2 hours without variance of >10% for 2 consecutive readings   -BMP with Mg and Phos q4h for now   -Replete K as necessary   -Patient refusing EKG   -Procalcitonin 1.96   -follow-up pancultures, procalcitonin, EKG     2. Uncontrolled type 1 diabetes   -Follows with Dr. Renan Eason   -Upon discharge 10/15, patient was on Lantus 13 U daily, LDSS, and 4U premeal. Insulin pump started this August.    -Per the patient, she was not restarted on the pump from this last admission and has only been on the sliding scale. 3. EMILY stage I likely hemodynamically mediated 2/2 volume depletion (emesis, diarrhea)   -BUN/Cr on admission /1.0   -Cr 0.5 on discharge 10/13 (baseline Cr 0.5-0.6)   -Continue fluids    -Monitor renal function    4. HAGMA likely multifactorial 2/2 Lactic acidosis type B (2/2 DKA) vs starvation ketosis vs uremia    -pH 7.17 with CO2 6 on presentation   -BUN/Cr 34/1.0; BUN 10 on 10/13   -Blood glucose 1,059 and in DKA   -Lactic acid 4.9   -Continue IVF with DKA protocol     5.  H/o recent COVID infection    -Positive covid on 10/8   -Negative covid this admission    -CXR unremarkable    -, CRP <0.1, d-dimer 257, fibrinogen 369, ferritin 64      PT/OT evaluation: Not indicated at this time  DVT prophylaxis/ GI prophylaxis: Lovenox/Protonix  Disposition: MICU admission     Denia Rodrigez MD, PGY-1   Attending physician: Dr. Ara El  Department of Pulmonary, Critical Care and [facility]  Department of Internal Medicine      During multidisciplinary team rounds Deshaun Romero is a 27 y.o. female was seen, examined and discussed. This is confirmation that I have personally seen and examined the patient and that the key elements of the encounter were performed by me (> 85 % time). The medications & laboratory data was discussed and adjusted where necessary. The radiographic images were reviewed or with radiologist or consultant if felt dis-concordant with the exam or history. The above findings were corroborated, plans confirmed and changes made if needed. Family is updated at the bedside as available. Key issues of the case were discussed among consultants. Critical Care time is documented if appropriate.       Ximena Cuevas DO, FACP, FCCP, [facility],

## 2020-10-18 NOTE — PROGRESS NOTES
This RN asked patient if she needed to void since there has been no void this shift. Patient refused to respond. MICU resident [facility] notified of no void.

## 2020-10-18 NOTE — ED NOTES
Critical labs given to Dr Kiley Frye    CO2: 6  Glucose: 1059  Phos: 9.9  Venous pH: 7.17     Uyen Ayala RN  10/17/20 4542

## 2020-10-19 LAB
ANION GAP SERPL CALCULATED.3IONS-SCNC: 10 MMOL/L (ref 7–16)
ANION GAP SERPL CALCULATED.3IONS-SCNC: 11 MMOL/L (ref 7–16)
ANION GAP SERPL CALCULATED.3IONS-SCNC: 11 MMOL/L (ref 7–16)
ANION GAP SERPL CALCULATED.3IONS-SCNC: 15 MMOL/L (ref 7–16)
BACTERIA: ABNORMAL /HPF
BASOPHILS ABSOLUTE: 0.02 E9/L (ref 0–0.2)
BASOPHILS RELATIVE PERCENT: 0.2 % (ref 0–2)
BILIRUBIN URINE: NEGATIVE
BLOOD, URINE: NEGATIVE
BUN BLDV-MCNC: 10 MG/DL (ref 6–20)
BUN BLDV-MCNC: 12 MG/DL (ref 6–20)
BUN BLDV-MCNC: 6 MG/DL (ref 6–20)
BUN BLDV-MCNC: 6 MG/DL (ref 6–20)
CALCIUM SERPL-MCNC: 8.4 MG/DL (ref 8.6–10.2)
CALCIUM SERPL-MCNC: 8.6 MG/DL (ref 8.6–10.2)
CALCIUM SERPL-MCNC: 8.9 MG/DL (ref 8.6–10.2)
CALCIUM SERPL-MCNC: 9 MG/DL (ref 8.6–10.2)
CHLORIDE BLD-SCNC: 102 MMOL/L (ref 98–107)
CHLORIDE BLD-SCNC: 108 MMOL/L (ref 98–107)
CLARITY: CLEAR
CO2: 18 MMOL/L (ref 22–29)
CO2: 19 MMOL/L (ref 22–29)
CO2: 22 MMOL/L (ref 22–29)
CO2: 24 MMOL/L (ref 22–29)
COLOR: YELLOW
CREAT SERPL-MCNC: 0.5 MG/DL (ref 0.5–1)
CREAT SERPL-MCNC: 0.6 MG/DL (ref 0.5–1)
EOSINOPHILS ABSOLUTE: 0.02 E9/L (ref 0.05–0.5)
EOSINOPHILS RELATIVE PERCENT: 0.2 % (ref 0–6)
EPITHELIAL CELLS, UA: ABNORMAL /HPF
GFR AFRICAN AMERICAN: >60
GFR NON-AFRICAN AMERICAN: >60 ML/MIN/1.73
GLUCOSE BLD-MCNC: 148 MG/DL (ref 74–99)
GLUCOSE BLD-MCNC: 268 MG/DL (ref 74–99)
GLUCOSE BLD-MCNC: 299 MG/DL (ref 74–99)
GLUCOSE BLD-MCNC: 88 MG/DL (ref 74–99)
GLUCOSE URINE: 500 MG/DL
HCG(URINE) PREGNANCY TEST: NEGATIVE
HCT VFR BLD CALC: 25.9 % (ref 34–48)
HEMOGLOBIN: 8.6 G/DL (ref 11.5–15.5)
IMMATURE GRANULOCYTES #: 0.07 E9/L
IMMATURE GRANULOCYTES %: 0.5 % (ref 0–5)
IRON SATURATION: 13 % (ref 15–50)
IRON: 38 MCG/DL (ref 37–145)
KETONES, URINE: 15 MG/DL
LACTIC ACID: 0.9 MMOL/L (ref 0.5–2.2)
LACTIC ACID: 0.9 MMOL/L (ref 0.5–2.2)
LACTIC ACID: 3.1 MMOL/L (ref 0.5–2.2)
LACTIC ACID: 3.4 MMOL/L (ref 0.5–2.2)
LEUKOCYTE ESTERASE, URINE: ABNORMAL
LYMPHOCYTES ABSOLUTE: 3.46 E9/L (ref 1.5–4)
LYMPHOCYTES RELATIVE PERCENT: 26.2 % (ref 20–42)
MAGNESIUM: 1.6 MG/DL (ref 1.6–2.6)
MAGNESIUM: 1.6 MG/DL (ref 1.6–2.6)
MAGNESIUM: 1.7 MG/DL (ref 1.6–2.6)
MAGNESIUM: 1.7 MG/DL (ref 1.6–2.6)
MCH RBC QN AUTO: 28 PG (ref 26–35)
MCHC RBC AUTO-ENTMCNC: 33.2 % (ref 32–34.5)
MCV RBC AUTO: 84.4 FL (ref 80–99.9)
METER GLUCOSE: 107 MG/DL (ref 74–99)
METER GLUCOSE: 143 MG/DL (ref 74–99)
METER GLUCOSE: 331 MG/DL (ref 74–99)
METER GLUCOSE: 70 MG/DL (ref 74–99)
METER GLUCOSE: 87 MG/DL (ref 74–99)
MONOCYTES ABSOLUTE: 0.76 E9/L (ref 0.1–0.95)
MONOCYTES RELATIVE PERCENT: 5.8 % (ref 2–12)
NEUTROPHILS ABSOLUTE: 8.87 E9/L (ref 1.8–7.3)
NEUTROPHILS RELATIVE PERCENT: 67.1 % (ref 43–80)
NITRITE, URINE: NEGATIVE
PDW BLD-RTO: 13.8 FL (ref 11.5–15)
PH UA: 5.5 (ref 5–9)
PHOSPHORUS: 2.2 MG/DL (ref 2.5–4.5)
PHOSPHORUS: 2.4 MG/DL (ref 2.5–4.5)
PHOSPHORUS: 2.6 MG/DL (ref 2.5–4.5)
PHOSPHORUS: 2.8 MG/DL (ref 2.5–4.5)
PLATELET # BLD: 296 E9/L (ref 130–450)
PMV BLD AUTO: 10.5 FL (ref 7–12)
POTASSIUM SERPL-SCNC: 4.1 MMOL/L (ref 3.5–5)
POTASSIUM SERPL-SCNC: 4.2 MMOL/L (ref 3.5–5)
POTASSIUM SERPL-SCNC: 4.3 MMOL/L (ref 3.5–5)
POTASSIUM SERPL-SCNC: 4.8 MMOL/L (ref 3.5–5)
PROTEIN UA: NEGATIVE MG/DL
RBC # BLD: 3.07 E12/L (ref 3.5–5.5)
RBC UA: ABNORMAL /HPF (ref 0–2)
REASON FOR REJECTION: NORMAL
REJECTED TEST: NORMAL
SODIUM BLD-SCNC: 132 MMOL/L (ref 132–146)
SODIUM BLD-SCNC: 135 MMOL/L (ref 132–146)
SODIUM BLD-SCNC: 136 MMOL/L (ref 132–146)
SODIUM BLD-SCNC: 141 MMOL/L (ref 132–146)
SPECIFIC GRAVITY UA: 1.02 (ref 1–1.03)
TOTAL IRON BINDING CAPACITY: 294 MCG/DL (ref 250–450)
UROBILINOGEN, URINE: 0.2 E.U./DL
WBC # BLD: 13.2 E9/L (ref 4.5–11.5)
WBC UA: ABNORMAL /HPF (ref 0–5)

## 2020-10-19 PROCEDURE — 2140000000 HC CCU INTERMEDIATE R&B

## 2020-10-19 PROCEDURE — 80048 BASIC METABOLIC PNL TOTAL CA: CPT

## 2020-10-19 PROCEDURE — 83550 IRON BINDING TEST: CPT

## 2020-10-19 PROCEDURE — 6370000000 HC RX 637 (ALT 250 FOR IP): Performed by: INTERNAL MEDICINE

## 2020-10-19 PROCEDURE — 99254 IP/OBS CNSLTJ NEW/EST MOD 60: CPT | Performed by: INTERNAL MEDICINE

## 2020-10-19 PROCEDURE — 84100 ASSAY OF PHOSPHORUS: CPT

## 2020-10-19 PROCEDURE — 85025 COMPLETE CBC W/AUTO DIFF WBC: CPT

## 2020-10-19 PROCEDURE — 6360000002 HC RX W HCPCS: Performed by: INTERNAL MEDICINE

## 2020-10-19 PROCEDURE — 2580000003 HC RX 258: Performed by: INTERNAL MEDICINE

## 2020-10-19 PROCEDURE — 83735 ASSAY OF MAGNESIUM: CPT

## 2020-10-19 PROCEDURE — 83605 ASSAY OF LACTIC ACID: CPT

## 2020-10-19 PROCEDURE — 82962 GLUCOSE BLOOD TEST: CPT

## 2020-10-19 PROCEDURE — 83540 ASSAY OF IRON: CPT

## 2020-10-19 RX ORDER — SODIUM CHLORIDE, SODIUM LACTATE, POTASSIUM CHLORIDE, CALCIUM CHLORIDE 600; 310; 30; 20 MG/100ML; MG/100ML; MG/100ML; MG/100ML
INJECTION, SOLUTION INTRAVENOUS CONTINUOUS
Status: DISCONTINUED | OUTPATIENT
Start: 2020-10-19 | End: 2020-10-19

## 2020-10-19 RX ORDER — DEXTROSE MONOHYDRATE 25 G/50ML
12.5 INJECTION, SOLUTION INTRAVENOUS PRN
Status: DISCONTINUED | OUTPATIENT
Start: 2020-10-19 | End: 2020-10-20 | Stop reason: HOSPADM

## 2020-10-19 RX ORDER — LABETALOL HYDROCHLORIDE 5 MG/ML
5 INJECTION, SOLUTION INTRAVENOUS ONCE
Status: COMPLETED | OUTPATIENT
Start: 2020-10-19 | End: 2020-10-20

## 2020-10-19 RX ORDER — NICOTINE POLACRILEX 4 MG
15 LOZENGE BUCCAL PRN
Status: DISCONTINUED | OUTPATIENT
Start: 2020-10-19 | End: 2020-10-20 | Stop reason: HOSPADM

## 2020-10-19 RX ORDER — DEXTROSE MONOHYDRATE 50 MG/ML
100 INJECTION, SOLUTION INTRAVENOUS PRN
Status: DISCONTINUED | OUTPATIENT
Start: 2020-10-19 | End: 2020-10-20 | Stop reason: HOSPADM

## 2020-10-19 RX ORDER — INSULIN GLARGINE 100 [IU]/ML
12 INJECTION, SOLUTION SUBCUTANEOUS NIGHTLY
Status: DISCONTINUED | OUTPATIENT
Start: 2020-10-19 | End: 2020-10-20

## 2020-10-19 RX ADMIN — POTASSIUM CHLORIDE 20 MEQ: 400 INJECTION, SOLUTION INTRAVENOUS at 02:05

## 2020-10-19 RX ADMIN — INSULIN LISPRO 4 UNITS: 100 INJECTION, SOLUTION INTRAVENOUS; SUBCUTANEOUS at 03:26

## 2020-10-19 RX ADMIN — ENOXAPARIN SODIUM 40 MG: 40 INJECTION SUBCUTANEOUS at 09:45

## 2020-10-19 RX ADMIN — INSULIN LISPRO 1 UNITS: 100 INJECTION, SOLUTION INTRAVENOUS; SUBCUTANEOUS at 17:05

## 2020-10-19 RX ADMIN — SODIUM CHLORIDE, POTASSIUM CHLORIDE, SODIUM LACTATE AND CALCIUM CHLORIDE: 600; 310; 30; 20 INJECTION, SOLUTION INTRAVENOUS at 06:58

## 2020-10-19 RX ADMIN — DEXTROSE AND SODIUM CHLORIDE: 5; 450 INJECTION, SOLUTION INTRAVENOUS at 21:50

## 2020-10-19 ASSESSMENT — PAIN SCALES - GENERAL
PAINLEVEL_OUTOF10: 0
PAINLEVEL_OUTOF10: 8

## 2020-10-19 ASSESSMENT — PAIN DESCRIPTION - LOCATION: LOCATION: BACK

## 2020-10-19 ASSESSMENT — PAIN DESCRIPTION - DESCRIPTORS: DESCRIPTORS: SHARP;STABBING

## 2020-10-19 ASSESSMENT — PAIN DESCRIPTION - PAIN TYPE: TYPE: ACUTE PAIN

## 2020-10-19 ASSESSMENT — PAIN DESCRIPTION - ONSET: ONSET: ON-GOING

## 2020-10-19 ASSESSMENT — PAIN DESCRIPTION - ORIENTATION: ORIENTATION: LOWER

## 2020-10-19 ASSESSMENT — PAIN - FUNCTIONAL ASSESSMENT: PAIN_FUNCTIONAL_ASSESSMENT: ACTIVITIES ARE NOT PREVENTED

## 2020-10-19 ASSESSMENT — PAIN DESCRIPTION - FREQUENCY: FREQUENCY: CONTINUOUS

## 2020-10-19 ASSESSMENT — PAIN DESCRIPTION - PROGRESSION: CLINICAL_PROGRESSION: NOT CHANGED

## 2020-10-19 NOTE — PROGRESS NOTES
Department of Internal Medicine  Division of Pulmonary, Critical Care and Sleep Medicine  ICU Attending Addendum    Attending Physician Statement  Albina Schneider was seen, examined and discussed with the multi-disciplinary ICU team during rounds. I have personally seen and examined the patient and the key elements of the encounter were performed by me. The medications & laboratory data was discussed and adjusted where necessary. The radiographic images were reviewed either as a group or with radiologist if felt dis-concordant with the exam or history. The above findings were corroborated, plans confirmed and changes made if needed. Family is updated at the bedside if available. Key issues of the case were discussed among consultants. Critical Care time is documented if appropriate. Changes to the notes are place in italicized print. Briefly,   S/p DKA. Improved.   COVID 19 (+)  Back on Lantus, holding if BS < 100  IVF with d5 1/2 Ns x 24 hrs  Ok to d/c ICU

## 2020-10-19 NOTE — PROGRESS NOTES
Hospital Medicine Progress Note      Date of Admission: 10/17/2020  Hospital day # 1    Course: Follow-up on DKA    Subjective    Patient seen in ICU  No complaints at the evaluation  Stable overnight. No other overnight issues reported. Exam:    BP (!) 128/94   Pulse 115   Temp 96.9 °F (36.1 °C) (Infrared)   Resp 16   Ht 5' 1\" (1.549 m)   Wt 93 lb 4.8 oz (42.3 kg)   LMP 05/17/2020   SpO2 100%   BMI 17.63 kg/m²     General appearance: Acutely ill, appears stated age and cooperative. HEENT: Pupils equal, round, and reactive to light. Conjunctivae/corneas clear. Neck: Supple. No jugular venous distention. Trachea midline. Respiratory:  Normal respiratory effort. Clear to auscultation, bilaterally without Rales/Wheezes/Rhonchi. Cardiovascular:  S1/S2   Abdomen: Soft, non-tender, non-distended with normal bowel sounds. Musculoskeletal: No clubbing, cyanosis or edema bilaterally.   Skin:  No rashes    Neurologic: awake, alert and following commands     Medications:  Reviewed    Infusion Medications    dextrose      lactated ringers 100 mL/hr at 10/19/20 0658    dextrose 5 % and 0.45 % NaCl Stopped (10/19/20 0208)    insulin Stopped (10/19/20 0050)    sodium chloride Stopped (10/18/20 0321)     Scheduled Medications    insulin lispro  0-6 Units Subcutaneous Q4H    enoxaparin  40 mg Subcutaneous Daily    insulin glargine  13 Units Subcutaneous Nightly    insulin lispro  4 Units Subcutaneous TID WC     PRN Meds: glucose, dextrose, glucagon (rDNA), dextrose, dextrose 5 % and 0.45 % NaCl, potassium chloride, ondansetron, magnesium sulfate, sodium phosphate IVPB **OR** sodium phosphate IVPB **OR** sodium phosphate IVPB    I/O    Intake/Output Summary (Last 24 hours) at 10/19/2020 0743  Last data filed at 10/19/2020 0513  Gross per 24 hour   Intake 5613 ml   Output 1000 ml   Net 4613 ml       Labs:   Recent Labs     10/17/20  2235 10/19/20  0511   WBC 17.0* 13.2*   HGB 10.5* 8.6*   HCT 34.1 25.9*  296       Recent Labs     10/18/20  1947 10/18/20  2348 10/19/20  0321    135 132   K 4.7 4.8 4.3    102 102   CO2 20* 18* 19*   BUN 15 12 10   CREATININE 0.6 0.6 0.5   CALCIUM 8.4* 8.6 8.4*   PHOS 2.3* 2.2* 2.4*       Recent Labs     10/17/20  2235 10/18/20  1947   PROT 7.0  --    ALKPHOS 71  --    ALT 26  --    AST 20  --    BILITOT 0.3  --    LIPASE 7* 6*       No results for input(s): INR in the last 72 hours. Recent Labs     10/18/20  0339 10/18/20  0341   CKTOTAL  --  99   TROPONINI <0.01  --        Other labs:  Lab Results   Component Value Date    CHOL 175 01/24/2018    TRIG 106 01/24/2018    HDL 40 01/24/2018    LDLCALC 114 (H) 01/24/2018    INR 1.0 02/10/2019    LABA1C 11.2 (H) 10/18/2020       Radiology:  Imaging studies reviewed today. ASSESSMENT:    DKA  High anion gap metabolic acidosis  Dehydration  Hyperkalemia  Nausea  Vomiting  COVID-19 positive by history      PLAN:    ICU  S/p Insulin drip  Lantus started  Diabetic diet  Monitor electrolytes  A1c 13  Anion gap close  Anion gap on admission 42  COVID-19 negative this admission          Diet: DIET CARB CONTROL; Carb Control: 4 carb choices (60 gms)/meal  Code Status: Full Code    PT/OT Eval Status: [] Ordered [] Evaluation noted [x] Not applicable    DVT Prophylaxis: [x]Lovenox []Heparin []PCD [] [address] []Encouraged ambulation []N/A    Likely disposition when able:  [x]Home [] Home with [facility] [] SNF/[facility] [] Acute Rehab [] LTAC []Other    +++++++++++++++++++++++++++++++++++++++++++++++++  Nisha Sanhcez MD, Hospitalist  +++++++++++++++++++++++++++++++++++++++++++++++++  NOTE: This report was transcribed using voice recognition software.  Every effort was made to ensure accuracy; however, inadvertent computerized transcription errors may be present.

## 2020-10-19 NOTE — PROGRESS NOTES
Diabetes education consult received for frequent readmissions for DKA. Patient is currently in COVID-19 isolation and has no phone in her room, so I am unable to speak with her at this time, but she is very well known to me. My coworkers and I have provided education to her multiple times in the last few months, most recently on 10/9/20. I spoke with her nurse, Matt Jones, and asked that we be consulted again in the future for any new needs that we have not already covered. Patient has been noncompliant with treatment, despite continued education efforts. We will keep encouraging her to attend outpatient diabetes education to help increase compliance. Thank you for this consult.  Donavan Hitchcock, RN, BSN, [place]

## 2020-10-19 NOTE — PROGRESS NOTES
[address] [facility]  Department of Internal Medicine   Internal Medicine Residency   MICU Progress Note    Patient:  Andrew Gloria 27 y.o. female  MRN: [id number]     Date of Service: 10/19/2020    Allergy: Patient has no known allergies. Subjective     Have seen and examined patient at bedside this morning. Patient is A&O x3, Patient denies fever or chills, abdominal pain, chest pain or SOB. No nausea/vomiting or diarrhea. Potassium 4.2, bicarb 22, AG 11. ->88     24h change: AG closed yesterday. Stop insulin drips last night and currently on subcutaneous insulin. ROS: Denies Fever/chills/CP/SOB/N/V/D/C/Dysuria/Blood in stool or urine  Objective     VS: BP (!) 153/107   Pulse 102   Temp 97.2 °F (36.2 °C)   Resp 15   Ht 5' 1\" (1.549 m)   Wt 93 lb 4.8 oz (42.3 kg)   LMP 05/17/2020   SpO2 100%   BMI 17.63 kg/m²           I & O - 24hr:     Intake/Output Summary (Last 24 hours) at 10/19/2020 1457  Last data filed at 10/19/2020 1400  Gross per 24 hour   Intake 6333 ml   Output 1250 ml   Net 5083 ml       Physical Exam:  · General Appearance: alert, appears stated age, cooperative and no distress  · Neck: no adenopathy, no carotid bruit, no JVD, supple, symmetrical, trachea midline and thyroid not enlarged, symmetric, no tenderness/mass/nodules  · Lung: clear to auscultation bilaterally  · Heart: regular rate and rhythm, S1, S2 normal, no murmur, click, rub or gallop  · Abdomen: soft, non-tender; bowel sounds normal; no masses,  no organomegaly  · Extremities:  extremities normal, atraumatic, no cyanosis or edema  · Musculoskeletal: No joint swelling, no muscle tenderness. ROM normal in all joints of extremities. · Neurologic: Mental status: Alert, oriented, thought content appropriate    Lines     site day    Art line   None    TLC None    PICC None    Hemoaccess None    Oxygen:     On room air    Mechanical Ventilation:   none  ABG:     Lab Results   Component Value Date    PH 7.395 Date    LIPASE 6 10/18/2020       Imaging Studies:  CXR:   No pneumothorax.         No acute cardiopulmonary disease. Resident's Assessment and Plan     26 yo female with PMHx  Type 1 DM with noncompliance and multiple hospital admission due to DKA presents to the ED with c/o abdominal pain, nausea/vomiting and diarrhea. Lab results compatible with DKA and patient was transferred to ICU for further management. DKA in the settings of insulin-dependent type 1 diabetes mellitus  - Likely 2/2 infection vs noncomliance   - AG closed to 11 yesterday, Bicarb 22, currently on Lantus 13U nightly, lispro 4 unit pre meal and LDSS  - Blood glucose 1059 on presentation -> 268 this morning -> 80 during round. -> hold Lispro   - Continue monitor POCT glucose q4h, BMP q4h. - Continue LR infusion 100cc/hr   - Replace K as needed. - Follows blood cultures, daily CBC  - Hypoglycemia protocol   - On carb control diet. - Continue monitor vitals, symptoms. Insulin- dependent Type 1 DM  - Home medication: Lantus 13U nightly, Lispro 4U pre meal and LDSS  - HbA1c 11% ( 10/18/2020)  - Patient reports noncompliance with medication  - Followed with endocrinology at clinic. Was on insulin pump since August but patient has not restarted on the pump from the last admission ( 10/09/2020)   - Will follow at out patient clinic  HAGMA 2/2 lactic acidosis vs DKA, resolved  - Bicarb 22, AG 11, lactic acid 0.9  today  - On DKA protocol. AG closed yesterday -> bridge to subcutaneous insulin   - Continue monitor BMP q4h, monitor vitals. EMILY stage 1 likely prerenal 2/2 dehydration, resolved  - Creatinine 1.0 on admission ->0.5 today. - Continue monitor BMP, renal function     Acute normocytic anemia  - Hb 10.5 on admission -> 8.6 (baseline 10.5-11.5)  - Ferritin 64. Iron study 2018 showed Iron 37, TIBC 421. -> repeat iron study  - Continue monitor daily CBC.      COVID positive 10/08/2020:   - Patient denies symptoms.  - Chest XR unremarkable  - Currently isolation  - Continue monitor vitals. Leukocytosis  - Patient is afebrile, denies cough, SOB, abdominal pain or diarrhea, no urinary symptoms.  - WBC 17 -> 13.2 today. procalcitonin 1.96 on presentation  - Blood cultures sent, following results  - Continue monitor vitals, daily CBC    PT/OT: not active  DVT ppx: lovenox  GI ppx: protonix      Klever Verduzco MD, PGY-1    Attending physician: Dr. Willam Hannon.

## 2020-10-19 NOTE — CONSULTS
ENDOCRINOLOGY INITIAL CONSULTATION NOTE VIA TELEMEDICINE SERVICE       Date of admission: 10/17/2020  Date of service: 10/19/2020  Admitting physician: Cristina Early DO   Primary Care Physician: Linda Lux DO  Consultant physician: Rufino Mckeon MD       Reason for the consultation:  DM type 1 admitted with DKA     History of Present Illness:  Services were provided through a video synchronous discussion     Rambo Wilson is a 27 y.o. old female with PMH of DM type 1 admitted to [facility] on 10/17/2020 because of nausea and vomiting and found to be in DKA, endocrine team was consulted for diabetes management. The patient has been in and out of the hospital frequently with DKA due to noncompliance with diabetic management. Patient also c/o back pain for which has been going on for almost 5  months. previous work up including MRI were negative. The patient denied cough, fever, chills, chest pain or SOB    Patient was admitted to [facility] 10/8-10/9 for DKA/uncontrolled type I DM and treated with insulin gtt with improvement and transition to subcu insulin. Patient was also found to be Covid positive during admission on 10/8/2020, without treatment. Patient restarted insulin pump and discharged home. ED Course:   Temp (22), tachycardia (102), 106/71, 98% on room air. Up on arrival to ER, BS 1059, AG 42, Bicarb 6, K 6.4,     Prior to admission  Type 1 DM was diagnosed at the age 8. Prior to admission, patient started using insulin pump in 8/2020. She is currently on 670g Medtronic insulin pump with following settings: Basal rate 12a 0.8, CR 14, ISF 53, goal 12a 110-130, 7a 100-120, 9p 110-130, active 3 hrs. Patient has been eating consistent carbohydrate meals, she checks BS 2-3 times a day and self-blood glucose monitoring has been highly variable prior to admission. She is due for annual eye exam but denied any history of diabetic retinopathy.   The patient performs her own feet care and doesn't see podiatry service  Microvascular complications:  No Retinopathy, Nephropathy + mild Neuropathy   Macrovascular complications: no CAD, PVD, or Stroke    Lab Results   Component Value Date    LABA1C 11.2 10/18/2020       After admission   lantus 13 units daily, Humalog low dose sliding scale, Humalog 4 units with meals     Inpatient diet:   Carb Restricted diet     Point of care glucose monitoring (Independently reviewed)   Recent Labs     10/18/20  1709 10/18/20  1818 10/18/20  1944 10/18/20  2103 10/18/20  2245 10/19/20  0203 10/19/20  0954 10/19/20  1114   GLUMET 160* 108* 157* 218* 235* 331* 70* 87       Past medical history:   Past Medical History:   Diagnosis Date    Bleeding at insertion site 1/5/2018    Common femoral artery injury, right, initial encounter 1/5/2018    Depressive disorder     DM type 1 (diabetes mellitus, type 1) ([facility] [address]        Past surgical history:  Past Surgical History:   Procedure Laterality Date    ABDOMEN SURGERY N/A 5/9/2019    INCISION AND DRAINAGE OF SUPRAPUBIC ABSESS performed by Laurie Angeles MD at [facility]      last yr       Social history:   Tobacco:   reports that she has never smoked. She has never used smokeless tobacco.  Alcohol:   reports no history of alcohol use. Drugs:   reports no history of drug use.     Family history:    Family History   Problem Relation Age of Onset    Diabetes Maternal Grandmother     Diabetes Paternal Grandmother     Stroke Other     Thyroid Disease Neg Hx        Allergy and drug reactions:   No Known Allergies    Scheduled Meds:   insulin lispro  0-6 Units Subcutaneous Q4H    insulin glargine  13 Units Subcutaneous Nightly    [Held by provider] insulin lispro  4 Units Subcutaneous TID WC     PRN Meds:   glucose, 15 g, PRN  dextrose, 12.5 g, PRN  glucagon (rDNA), 1 mg, PRN  dextrose, 100 mL/hr, PRN  dextrose 5 % and 0.45 % NaCl, , Continuous PRN  ondansetron, 4 mg, Q6H PRN      Continuous Infusions:   dextrose      lactated ringers 100 mL/hr at 10/19/20 0658    dextrose 5 % and 0.45 % NaCl Stopped (10/19/20 0208)       Review of Systems  All systems reviewed. All negative except for symptoms mentioned in HPI     OBJECTIVE    BP (!) 138/98   Pulse 106   Temp 97.2 °F (36.2 °C)   Resp 15   Ht 5' 1\" (1.549 m)   Wt 93 lb 4.8 oz (42.3 kg)   LMP 05/17/2020   SpO2 100%   BMI 17.63 kg/m²     Intake/Output Summary (Last 24 hours) at 10/19/2020 1656  Last data filed at 10/19/2020 1400  Gross per 24 hour   Intake 6333 ml   Output 1250 ml   Net 5083 ml       Physical examination:  Due to this being a TeleHealth encounter, evaluation of the following organ systems is limited:   EENT/Resp/CV/GI//MS/Neuro/Skin/Heme-Lymph-Imm. General: awake alert, oriented x3, still feels sick   Pulm: move with respiration   Skin: no rash  Psych: normal mood, and affect    Review of Laboratory Data:  I personally reviewed the following labs:   Recent Labs     10/17/20  2235 10/19/20  0511   WBC 17.0* 13.2*   RBC 3.83 3.07*   HGB 10.5* 8.6*   HCT 34.1 25.9*   MCV 89.0 84.4   MCH 27.4 28.0   MCHC 30.8* 33.2   RDW 13.8 13.8    296   MPV 11.5 10.5     Recent Labs     10/17/20  2235  10/18/20  2348 10/19/20  0321 10/19/20  1124   *   < > 135 132 141   K 6.4*   < > 4.8 4.3 4.2   CL 81*   < > 102 102 108*   CO2 6*   < > 18* 19* 22   BUN 34*   < > 12 10 6   CREATININE 1.0   < > 0.6 0.5 0.5   GLUCOSE 1,059*   < > 299* 268* 88   CALCIUM 9.7   < > 8.6 8.4* 8.9   PROT 7.0  --   --   --   --    LABALBU 4.0  --   --   --   --    BILITOT 0.3  --   --   --   --    ALKPHOS 71  --   --   --   --    AST 20  --   --   --   --    ALT 26  --   --   --   --     < > = values in this interval not displayed.      Beta-Hydroxybutyrate   Date Value Ref Range Status   10/17/2020 >4.50 (H) 0.02 - 0.27 mmol/L Final   10/11/2020 >4.50 (H) 0.02 - 0.27 mmol/L Final   10/10/2020 2.22 (H) 0.02 - 0.27 mmol/L Final     Lab Results   Component Value Date    LABA1C 11.2 10/18/2020    LABA1C 13.0 05/30/2020    LABA1C 15.9 03/09/2020     No results found for: TSH, T4FREE, R0WSJLN, FT3, S3BAXYW, TSI, TPOABS, THGAB  Lab Results   Component Value Date    LABA1C 11.2 10/18/2020    GLUCOSE 88 10/19/2020    MALBCR 204.6 01/23/2018    LABMICR 102.3 01/23/2018    LABCREA 12 10/18/2020     Lab Results   Component Value Date    TRIG 106 01/24/2018    HDL 40 01/24/2018    LDLCALC 114 01/24/2018    CHOL 175 01/24/2018       Blood culture   Lab Results   Component Value Date    BC 5 Days no growth 10/08/2020    BC 5 Days no growth 08/31/2020       Radiology:  XR CHEST PORTABLE   Final Result   Left IJ line with distal tip in the SVC. No pneumothorax. No acute cardiopulmonary disease. Medical Records/Labs/Images review:   I personally reviewed and summarized previous records   All labs and imaging were reviewed independently     Bob Espinoza, a 27 y.o.-old female seen today for inpatient diabetes management     Brittle type 1 DM admitted with DKA   · Patient's DM is very brittle and poorly controlled  due to poor compliance with diet and BS checking   · For now  · Change Lantus 12 units daily  · Humalog 2 units with meals   · Low dose sliding scale     · Glucose check before meals and at bed time   · Will titrate insulin dose based on blood glucose trend & insulin requirement    EMILY:  · Improved with IVF    COVID positive  · Last test was negative on 10/17   · Managed by primary team     The above issues were reviewed with the patient who understood and agreed with the plan. Thank you for allowing us to participate in the care of this patient. Please do not hesitate to contact us with any additional questions.      Melina Hodgkins MD  Endocrinologist, [facility]   [address] [facility] [address]   Phone: [phone]  Fax: [phone]  --------------------------------------------  An electronic signature was used to authenticate this note.  Harjinder Jara MD on 10/19/2020 at 4:56 PM  This visit was performed as a virtual video visit using a synchronous, two-way, audio-video telehealth technology platform

## 2020-10-20 VITALS
OXYGEN SATURATION: 98 % | WEIGHT: 93.3 LBS | BODY MASS INDEX: 17.61 KG/M2 | DIASTOLIC BLOOD PRESSURE: 100 MMHG | RESPIRATION RATE: 19 BRPM | HEIGHT: 61 IN | SYSTOLIC BLOOD PRESSURE: 144 MMHG | TEMPERATURE: 97.2 F | HEART RATE: 116 BPM

## 2020-10-20 LAB
ANION GAP SERPL CALCULATED.3IONS-SCNC: 11 MMOL/L (ref 7–16)
BUN BLDV-MCNC: 5 MG/DL (ref 6–20)
CALCIUM SERPL-MCNC: 8.6 MG/DL (ref 8.6–10.2)
CHLORIDE BLD-SCNC: 99 MMOL/L (ref 98–107)
CO2: 23 MMOL/L (ref 22–29)
CREAT SERPL-MCNC: 0.5 MG/DL (ref 0.5–1)
GFR AFRICAN AMERICAN: >60
GFR NON-AFRICAN AMERICAN: >60 ML/MIN/1.73
GLUCOSE BLD-MCNC: 262 MG/DL (ref 74–99)
MAGNESIUM: 1.7 MG/DL (ref 1.6–2.6)
METER GLUCOSE: 118 MG/DL (ref 74–99)
METER GLUCOSE: 142 MG/DL (ref 74–99)
METER GLUCOSE: 203 MG/DL (ref 74–99)
METER GLUCOSE: 259 MG/DL (ref 74–99)
PHOSPHORUS: 3.4 MG/DL (ref 2.5–4.5)
POTASSIUM SERPL-SCNC: 3.6 MMOL/L (ref 3.5–5)
SODIUM BLD-SCNC: 133 MMOL/L (ref 132–146)
URINE CULTURE, ROUTINE: NORMAL

## 2020-10-20 PROCEDURE — 99232 SBSQ HOSP IP/OBS MODERATE 35: CPT | Performed by: INTERNAL MEDICINE

## 2020-10-20 PROCEDURE — 6360000002 HC RX W HCPCS: Performed by: FAMILY MEDICINE

## 2020-10-20 PROCEDURE — 83735 ASSAY OF MAGNESIUM: CPT

## 2020-10-20 PROCEDURE — 2500000003 HC RX 250 WO HCPCS: Performed by: FAMILY MEDICINE

## 2020-10-20 PROCEDURE — 82962 GLUCOSE BLOOD TEST: CPT

## 2020-10-20 PROCEDURE — 36415 COLL VENOUS BLD VENIPUNCTURE: CPT

## 2020-10-20 PROCEDURE — 80048 BASIC METABOLIC PNL TOTAL CA: CPT

## 2020-10-20 PROCEDURE — 84100 ASSAY OF PHOSPHORUS: CPT

## 2020-10-20 PROCEDURE — 6370000000 HC RX 637 (ALT 250 FOR IP): Performed by: FAMILY MEDICINE

## 2020-10-20 PROCEDURE — 6370000000 HC RX 637 (ALT 250 FOR IP): Performed by: INTERNAL MEDICINE

## 2020-10-20 RX ORDER — HYDROCODONE BITARTRATE AND ACETAMINOPHEN 5; 325 MG/1; MG/1
1 TABLET ORAL EVERY 6 HOURS PRN
Status: DISCONTINUED | OUTPATIENT
Start: 2020-10-20 | End: 2020-10-20 | Stop reason: HOSPADM

## 2020-10-20 RX ORDER — INSULIN GLARGINE 100 [IU]/ML
12 INJECTION, SOLUTION SUBCUTANEOUS DAILY
Status: DISCONTINUED | OUTPATIENT
Start: 2020-10-20 | End: 2020-10-20

## 2020-10-20 RX ORDER — MAGNESIUM SULFATE IN WATER 40 MG/ML
2 INJECTION, SOLUTION INTRAVENOUS ONCE
Status: COMPLETED | OUTPATIENT
Start: 2020-10-20 | End: 2020-10-20

## 2020-10-20 RX ADMIN — INSULIN LISPRO 1 UNITS: 100 INJECTION, SOLUTION INTRAVENOUS; SUBCUTANEOUS at 17:10

## 2020-10-20 RX ADMIN — INSULIN LISPRO 3 UNITS: 100 INJECTION, SOLUTION INTRAVENOUS; SUBCUTANEOUS at 17:10

## 2020-10-20 RX ADMIN — HYDROCODONE BITARTRATE AND ACETAMINOPHEN 1 TABLET: 5; 325 TABLET ORAL at 00:44

## 2020-10-20 RX ADMIN — HYDROCODONE BITARTRATE AND ACETAMINOPHEN 1 TABLET: 5; 325 TABLET ORAL at 11:45

## 2020-10-20 RX ADMIN — INSULIN LISPRO 2 UNITS: 100 INJECTION, SOLUTION INTRAVENOUS; SUBCUTANEOUS at 06:55

## 2020-10-20 RX ADMIN — INSULIN LISPRO 3 UNITS: 100 INJECTION, SOLUTION INTRAVENOUS; SUBCUTANEOUS at 06:53

## 2020-10-20 RX ADMIN — INSULIN LISPRO 3 UNITS: 100 INJECTION, SOLUTION INTRAVENOUS; SUBCUTANEOUS at 11:45

## 2020-10-20 RX ADMIN — LABETALOL HYDROCHLORIDE 5 MG: 5 INJECTION INTRAVENOUS at 00:36

## 2020-10-20 RX ADMIN — INSULIN LISPRO 2 UNITS: 100 INJECTION, SOLUTION INTRAVENOUS; SUBCUTANEOUS at 11:45

## 2020-10-20 RX ADMIN — MAGNESIUM SULFATE HEPTAHYDRATE 2 G: 40 INJECTION, SOLUTION INTRAVENOUS at 00:44

## 2020-10-20 ASSESSMENT — PAIN DESCRIPTION - LOCATION
LOCATION: BACK
LOCATION: BACK

## 2020-10-20 ASSESSMENT — PAIN DESCRIPTION - FREQUENCY: FREQUENCY: CONTINUOUS

## 2020-10-20 ASSESSMENT — PAIN SCALES - GENERAL
PAINLEVEL_OUTOF10: 8
PAINLEVEL_OUTOF10: 10
PAINLEVEL_OUTOF10: 10
PAINLEVEL_OUTOF10: 0

## 2020-10-20 ASSESSMENT — PAIN DESCRIPTION - PAIN TYPE
TYPE: ACUTE PAIN
TYPE: ACUTE PAIN

## 2020-10-20 ASSESSMENT — PAIN DESCRIPTION - ONSET: ONSET: ON-GOING

## 2020-10-20 ASSESSMENT — PAIN DESCRIPTION - DESCRIPTORS: DESCRIPTORS: SHARP

## 2020-10-20 ASSESSMENT — PAIN DESCRIPTION - ORIENTATION: ORIENTATION: LOWER

## 2020-10-20 NOTE — PROGRESS NOTES
Left message with DR Bartolome Thomson to notify him of pt discharge and to verify diabetes management once pt is home.

## 2020-10-20 NOTE — PROGRESS NOTES
ENDOCRINOLOGY PROGRESS NOTE  TELEMEDICINE SERVICE    Date of Service: 10/20/2020  Date of Admission: 10/17/2020  Admitting Physician: Susi Han MD   Primary Care Physician: Irene Gomez DO  Consultant physician: Eric Valenzuela MD     Reason for the consultation:  DM type 1 admitted with DKA     History of Present Illness:  Senia Michele is a 27 y.o. old female with PMH of DM type 1 admitted to [facility] on 10/17/2020 because of nausea and vomiting and found to be in DKA, endocrine team was consulted for diabetes management    Subjective:  Seen, feeling better, BG improving     Inpatient diet:   Carb Restricted diet     Point of care glucose monitoring:   Independently reviewed   Recent Labs     10/19/20  0954 10/19/20  1114 10/19/20  1700 10/19/20  2149 10/20/20  0511 10/20/20  1119 10/20/20  1500 10/20/20  1705   GLUMET 70* 87 143* 107* 259* 203* 118* 142*       Scheduled Meds:   insulin lispro  3 Units Subcutaneous TID WC    insulin glargine  12 Units Subcutaneous Nightly    insulin lispro  0-6 Units Subcutaneous TID WC    insulin lispro  0-3 Units Subcutaneous Nightly     PRN Meds:   HYDROcodone 5 mg - acetaminophen, 1 tablet, Q6H PRN  glucose, 15 g, PRN  dextrose, 12.5 g, PRN  glucagon (rDNA), 1 mg, PRN  dextrose, 100 mL/hr, PRN  ondansetron, 4 mg, Q6H PRN      Continuous Infusions:   dextrose         Review of Systems  All systems reviewed. All negative except for symptoms mentioned in HPI     OBJECTIVE    BP (!) 152/112   Pulse 106   Temp 97.6 °F (36.4 °C) (Tympanic)   Resp 18   Ht 5' 1\" (1.549 m)   Wt 93 lb 4.8 oz (42.3 kg)   LMP 05/17/2020   SpO2 98%   BMI 17.63 kg/m²     Intake/Output Summary (Last 24 hours) at 10/20/2020 1214  Last data filed at 10/20/2020 0244  Gross per 24 hour   Intake 770 ml   Output 700 ml   Net 70 ml       Physical examination:  General: awake alert, oriented x3, no abnormal position or movements.   HEENT: normocephalic non-traumatic, no exophthalmos   Neck: supple, no LN enlargement, no thyromegaly, no thyroid tenderness, no JVD. Pulm: Clear equal air entry no added sounds, no wheezing or rhonchi    CVS: S1 + S2, no murmur, no heave  Abd: soft lax, no tenderness, no organomegaly, audible bowel sounds. Skin: warm, no lesions, no rash. Feet: sensory exam of the feet is present, tested with the monofilament. No ulcers or open wounds. Good peripheral pulses  Neuro: CN intact, muscle power normal  Psych: normal mood, and affect    Review of Laboratory Data:  I have reviewed the following:  Recent Labs     10/17/20  2235 10/19/20  0511   WBC 17.0* 13.2*   RBC 3.83 3.07*   HGB 10.5* 8.6*   HCT 34.1 25.9*   MCV 89.0 84.4   MCH 27.4 28.0   MCHC 30.8* 33.2   RDW 13.8 13.8    296   MPV 11.5 10.5     Recent Labs     10/17/20  2235  10/19/20  1124 10/19/20  1710 10/20/20  0455   *   < > 141 136 133   K 6.4*   < > 4.2 4.1 3.6   CL 81*   < > 108* 102 99   CO2 6*   < > 22 24 23   BUN 34*   < > 6 6 5*   CREATININE 1.0   < > 0.5 0.5 0.5   GLUCOSE 1,059*   < > 88 148* 262*   CALCIUM 9.7   < > 8.9 9.0 8.6   PROT 7.0  --   --   --   --    LABALBU 4.0  --   --   --   --    BILITOT 0.3  --   --   --   --    ALKPHOS 71  --   --   --   --    AST 20  --   --   --   --    ALT 26  --   --   --   --     < > = values in this interval not displayed.      Beta-Hydroxybutyrate   Date Value Ref Range Status   10/17/2020 >4.50 (H) 0.02 - 0.27 mmol/L Final   10/11/2020 >4.50 (H) 0.02 - 0.27 mmol/L Final   10/10/2020 2.22 (H) 0.02 - 0.27 mmol/L Final     Lab Results   Component Value Date    LABA1C 11.2 10/18/2020    LABA1C 13.0 05/30/2020    LABA1C 15.9 03/09/2020     No results found for: TSH, T4FREE, M8VYPQY, FT3, G5XKVJT, TSI, TPOABS, THGAB  Lab Results   Component Value Date    LABA1C 11.2 10/18/2020    GLUCOSE 262 10/20/2020    MALBCR 204.6 01/23/2018    LABMICR 102.3 01/23/2018    LABCREA 12 10/18/2020     Lab Results   Component Value Date    TRIG 106 01/24/2018    HDL 40 01/24/2018    LDLCALC 114 01/24/2018    CHOL 175 01/24/2018       Blood culture   Lab Results   Component Value Date    BC 5 Days no growth 10/08/2020    BC 5 Days no growth 08/31/2020       Radiology:  XR CHEST PORTABLE   Final Result   Left IJ line with distal tip in the SVC. No pneumothorax. No acute cardiopulmonary disease. Medical Records/Labs/Images review:   I personally reviewed and summarized previous records   All labs and imaging were reviewed independently     Bob Espinoza, a 27 y.o.-old female seen for inpatient diabetes management     Brittle type 1 DM admitted with DKA   · Patient's DM is very brittle and poorly controlled  due to poor compliance with diet and BS checking   · For now  ? Change Lantus 12 units daily  ? Humalog 3 units with meals   ? Low dose sliding scale      · Glucose check before meals and at bed time   · Will titrate insulin dose based on blood glucose trend & insulin requirement  · Ok to dc from endocrine standpoint, pt instructed to restart Pump once arrive home      EMILY:  · Resolved with IVF     COVID positive  · Last test was negative on 10/17   · Managed by primary team      Jeanine Jacob MD  Internal Medicine Resident, PGY-3     Interdisciplinary plan for communication with healthcare providers:   Consult recommendations were discussed with the Primary Service/Nursing staff      The above issues were reviewed with the patient who understood and agreed with the plan. Thank you for allowing us to participate in the care of this patient. Please do not hesitate to contact us with any additional questions. Arely Diaz MD  Endocrinologist, [facility] [place] [facility] Endocrin[facility]   [address] [facility] [address]   Phone: [phone]  Fax: [phone]  ----------------------  An electronic signature was used to authenticate this note.  Lo Elmore MD on 10/20/2020 at 6:55 PM

## 2020-10-20 NOTE — ACP (ADVANCE CARE PLANNING)
Advance Care Planning     Advance Care Planning Activator (Inpatient)  Conversation Note      Date of ACP Conversation: 10/17/2020    Conversation Conducted with: Patient with Decision Making Capacity    ACP Activator: James Nogueira    *When Decision Maker makes decisions on behalf of the incapacitated patient: Decision Maker is asked to consider and make decisions based on patient values, known preferences, or best interests. Health Care Decision Maker:     Current Designated Health Care Decision Maker:   Primary Decision Maker: Jesse Gerber - Parent - [phone]    Secondary Decision Maker: Jyotsna Rodrigues - Parent - [phone]  (If there is a [address] named in the [address] [facility] Makers\" box in the ACP activity, but it is not visible above, be sure to open that field and then select the health care decision maker relationship (ie \"primary\") in the blank space to the right of the name.) Validate  this information as still accurate & up-to-date; edit [employer] field as needed.)    Note: Assess and validate information in current ACP documents, as indicated. If no Decision Maker listed above or available through scanned documents, then:    If no Authorized Decision Maker has previously been identified, then patient chooses [address]:  \"Who would you like to name as your primary health care decision-maker? \"               Name: Steph Nava        Relationship: Parent          Phone number: [phone]  \"Can this person be reached easily? \" Yes  \"Who would you like to name as your back-up decision maker? \"   Name: Steven Cui        Relationship: Parent          Phone number: [phone]  Cotulla Bones this person be reached easily? \" Yes    Note: If the relationship of these Decision-Makers to the patient does NOT follow your state's Next of Kin hierarchy, recommend that patient complete ACP document that meets state-specific requirements to allow them to act on the patient's behalf when appropriate. Care Preferences    Ventilation: \"If you were in your present state of health and suddenly became very ill and were unable to breathe on your own, what would your preference be about the use of a ventilator (breathing machine) if it were available to you? \"      Would the patient desire the use of ventilator (breathing machine)?: yes    \"If your health worsens and it becomes clear that your chance of recovery is unlikely, what would your preference be about the use of a ventilator (breathing machine) if it were available to you? \"     Would the patient desire the use of ventilator (breathing machine)?: Yes      Resuscitation  \"CPR works best to restart the heart when there is a sudden event, like a heart attack, in someone who is otherwise healthy. Unfortunately, CPR does not typically restart the heart for people who have serious health conditions or who are very sick. \"    \"In the event your heart stopped as a result of an underlying serious health condition, would you want attempts to be made to restart your heart (answer \"yes\" for attempt to resuscitate) or would you prefer a natural death (answer \"no\" for do not attempt to resuscitate)? \" yes      NOTE: If the patient has a valid advance directive AND now provides care preference(s) that are inconsistent with that prior directive, advise the patient to consider either: creating a new advance directive that complies with state-specific requirements; or, if that is not possible, orally revoking that prior directive in accordance with state-specific requirements, which must be documented in the EHR. [] Yes   [x] No   Educated Patient / Killian Hercules regarding differences between Advance Directives and portable DNR orders.     Length of ACP Conversation in minutes:      Conversation Outcomes:  [x] ACP discussion completed  [] Existing advance directive reviewed with patient; no changes to patient's

## 2020-10-20 NOTE — PROGRESS NOTES
Left message with Dr Jovan Lofton Re: [place] held last night,  and to verify whether to continue with the Humalog increase

## 2020-10-20 NOTE — PROGRESS NOTES
Left message with diabetes education to inform them that patient needs contacted by them prior to being d/c today.

## 2020-11-01 ENCOUNTER — HOSPITAL ENCOUNTER (EMERGENCY)
Age: 25
Discharge: HOME | End: 2020-11-01
Attending: EMERGENCY MEDICINE
Payer: COMMERCIAL

## 2020-11-01 ENCOUNTER — APPOINTMENT (OUTPATIENT)
Dept: CT IMAGING | Age: 25
End: 2020-11-01
Payer: COMMERCIAL

## 2020-11-01 VITALS
DIASTOLIC BLOOD PRESSURE: 90 MMHG | OXYGEN SATURATION: 100 % | TEMPERATURE: 97.5 F | RESPIRATION RATE: 18 BRPM | HEART RATE: 110 BPM | SYSTOLIC BLOOD PRESSURE: 140 MMHG

## 2020-11-01 LAB
ALBUMIN SERPL-MCNC: 4.1 G/DL (ref 3.5–5.2)
ALP BLD-CCNC: 75 U/L (ref 35–104)
ALT SERPL-CCNC: 23 U/L (ref 0–32)
ANION GAP SERPL CALCULATED.3IONS-SCNC: 15 MMOL/L (ref 7–16)
AST SERPL-CCNC: 27 U/L (ref 0–31)
BILIRUB SERPL-MCNC: 0.2 MG/DL (ref 0–1.2)
BUN BLDV-MCNC: 13 MG/DL (ref 6–20)
CALCIUM SERPL-MCNC: 10 MG/DL (ref 8.6–10.2)
CHLORIDE BLD-SCNC: 97 MMOL/L (ref 98–107)
CO2: 25 MMOL/L (ref 22–29)
CREAT SERPL-MCNC: 0.6 MG/DL (ref 0.5–1)
GFR AFRICAN AMERICAN: >60
GFR AFRICAN AMERICAN: >60
GFR NON-AFRICAN AMERICAN: >60 ML/MIN/1.73
GFR NON-AFRICAN AMERICAN: >60 ML/MIN/1.73
GLUCOSE BLD-MCNC: 176 MG/DL (ref 74–99)
GLUCOSE BLD-MCNC: 187 MG/DL (ref 74–99)
GONADOTROPIN, CHORIONIC (HCG) QUANT: <0.1 MIU/ML
HCT VFR BLD CALC: 34.9 % (ref 34–48)
HEMOGLOBIN: 11.4 G/DL (ref 11.5–15.5)
LACTIC ACID, SEPSIS: 1.2 MMOL/L (ref 0.5–1.9)
LACTIC ACID, SEPSIS: 3.8 MMOL/L (ref 0.5–1.9)
MCH RBC QN AUTO: 27.3 PG (ref 26–35)
MCHC RBC AUTO-ENTMCNC: 32.7 % (ref 32–34.5)
MCV RBC AUTO: 83.5 FL (ref 80–99.9)
METER GLUCOSE: 184 MG/DL (ref 74–99)
PDW BLD-RTO: 13.6 FL (ref 11.5–15)
PERFORMED ON: ABNORMAL
PLATELET # BLD: 438 E9/L (ref 130–450)
PMV BLD AUTO: 10.6 FL (ref 7–12)
POC CHLORIDE: 101 MMOL/L (ref 100–108)
POC CREATININE: 0.7 MG/DL (ref 0.5–1)
POC POTASSIUM: 3.5 MMOL/L (ref 3.5–5)
POC SODIUM: 138 MMOL/L (ref 132–146)
POTASSIUM SERPL-SCNC: 3.6 MMOL/L (ref 3.5–5)
RBC # BLD: 4.18 E12/L (ref 3.5–5.5)
REASON FOR REJECTION: NORMAL
REASON FOR REJECTION: NORMAL
REJECTED TEST: NORMAL
REJECTED TEST: NORMAL
SODIUM BLD-SCNC: 137 MMOL/L (ref 132–146)
TOTAL PROTEIN: 7.5 G/DL (ref 6.4–8.3)
WBC # BLD: 9.8 E9/L (ref 4.5–11.5)

## 2020-11-01 PROCEDURE — 6360000004 HC RX CONTRAST MEDICATION: Performed by: RADIOLOGY

## 2020-11-01 PROCEDURE — 84132 ASSAY OF SERUM POTASSIUM: CPT

## 2020-11-01 PROCEDURE — 36415 COLL VENOUS BLD VENIPUNCTURE: CPT

## 2020-11-01 PROCEDURE — 83605 ASSAY OF LACTIC ACID: CPT

## 2020-11-01 PROCEDURE — 2580000003 HC RX 258: Performed by: RADIOLOGY

## 2020-11-01 PROCEDURE — 84295 ASSAY OF SERUM SODIUM: CPT

## 2020-11-01 PROCEDURE — 82435 ASSAY OF BLOOD CHLORIDE: CPT

## 2020-11-01 PROCEDURE — 84702 CHORIONIC GONADOTROPIN TEST: CPT

## 2020-11-01 PROCEDURE — 85027 COMPLETE CBC AUTOMATED: CPT

## 2020-11-01 PROCEDURE — 80053 COMPREHEN METABOLIC PANEL: CPT

## 2020-11-01 PROCEDURE — 96374 THER/PROPH/DIAG INJ IV PUSH: CPT

## 2020-11-01 PROCEDURE — 6360000002 HC RX W HCPCS: Performed by: EMERGENCY MEDICINE

## 2020-11-01 PROCEDURE — 2580000003 HC RX 258: Performed by: EMERGENCY MEDICINE

## 2020-11-01 PROCEDURE — 99284 EMERGENCY DEPT VISIT MOD MDM: CPT

## 2020-11-01 PROCEDURE — 74177 CT ABD & PELVIS W/CONTRAST: CPT

## 2020-11-01 PROCEDURE — 96376 TX/PRO/DX INJ SAME DRUG ADON: CPT

## 2020-11-01 PROCEDURE — 87040 BLOOD CULTURE FOR BACTERIA: CPT

## 2020-11-01 PROCEDURE — 82962 GLUCOSE BLOOD TEST: CPT

## 2020-11-01 PROCEDURE — 82010 KETONE BODYS QUAN: CPT

## 2020-11-01 PROCEDURE — 82565 ASSAY OF CREATININE: CPT

## 2020-11-01 PROCEDURE — 82947 ASSAY GLUCOSE BLOOD QUANT: CPT

## 2020-11-01 PROCEDURE — 93005 ELECTROCARDIOGRAM TRACING: CPT | Performed by: EMERGENCY MEDICINE

## 2020-11-01 RX ORDER — FENTANYL CITRATE 50 UG/ML
25 INJECTION, SOLUTION INTRAMUSCULAR; INTRAVENOUS
Status: DISCONTINUED | OUTPATIENT
Start: 2020-11-01 | End: 2020-11-02 | Stop reason: HOSPADM

## 2020-11-01 RX ORDER — 0.9 % SODIUM CHLORIDE 0.9 %
1000 INTRAVENOUS SOLUTION INTRAVENOUS ONCE
Status: COMPLETED | OUTPATIENT
Start: 2020-11-01 | End: 2020-11-01

## 2020-11-01 RX ORDER — POLYETHYLENE GLYCOL 3350 17 G/17G
POWDER, FOR SOLUTION ORAL
Qty: 1 BOTTLE | Refills: 0 | Status: SHIPPED | OUTPATIENT
Start: 2020-11-01 | End: 2020-11-10

## 2020-11-01 RX ORDER — SODIUM CHLORIDE 0.9 % (FLUSH) 0.9 %
10 SYRINGE (ML) INJECTION PRN
Status: DISCONTINUED | OUTPATIENT
Start: 2020-11-01 | End: 2020-11-02 | Stop reason: HOSPADM

## 2020-11-01 RX ADMIN — SODIUM CHLORIDE 1000 ML: 9 INJECTION, SOLUTION INTRAVENOUS at 16:40

## 2020-11-01 RX ADMIN — SODIUM CHLORIDE 1000 ML: 9 INJECTION, SOLUTION INTRAVENOUS at 16:45

## 2020-11-01 RX ADMIN — Medication 10 ML: at 19:10

## 2020-11-01 RX ADMIN — FENTANYL CITRATE 25 MCG: 50 INJECTION, SOLUTION INTRAMUSCULAR; INTRAVENOUS at 17:50

## 2020-11-01 RX ADMIN — IOPAMIDOL 90 ML: 755 INJECTION, SOLUTION INTRAVENOUS at 19:10

## 2020-11-01 RX ADMIN — FENTANYL CITRATE 25 MCG: 50 INJECTION, SOLUTION INTRAMUSCULAR; INTRAVENOUS at 19:54

## 2020-11-01 ASSESSMENT — PAIN SCALES - GENERAL
PAINLEVEL_OUTOF10: 10
PAINLEVEL_OUTOF10: 9
PAINLEVEL_OUTOF10: 5
PAINLEVEL_OUTOF10: 9
PAINLEVEL_OUTOF10: 10
PAINLEVEL_OUTOF10: 7
PAINLEVEL_OUTOF10: 10
PAINLEVEL_OUTOF10: 10

## 2020-11-01 ASSESSMENT — PAIN DESCRIPTION - PAIN TYPE
TYPE: ACUTE PAIN

## 2020-11-01 ASSESSMENT — ENCOUNTER SYMPTOMS
CONSTIPATION: 1
SHORTNESS OF BREATH: 0
CHEST TIGHTNESS: 0
DIARRHEA: 0
ABDOMINAL PAIN: 1
VOMITING: 0
NAUSEA: 0

## 2020-11-01 ASSESSMENT — PAIN DESCRIPTION - LOCATION
LOCATION: ABDOMEN
LOCATION: ABDOMEN;BACK

## 2020-11-01 ASSESSMENT — PAIN DESCRIPTION - DESCRIPTORS: DESCRIPTORS: STABBING;CRAMPING;SHARP

## 2020-11-01 NOTE — ED PROVIDER NOTES
51-year-old very petite female presenting with lightheadedness for couple of days. She is a type I diabetic, history of DKA. Says that she is also having lower quadrant abdominal pain on both sides. No nausea or vomiting or diarrhea. She says she is constipated has not been able to have bowel movement. She was try to have bowel movement earlier. Upon arrival she was 60/40 with her blood pressure and symptomatic with a heart rate in the 130s. Remained very lightheaded during initial examination. This began gradually, persistent, worsening, duration 2 days, associate with lower quadrant abdominal pain and lightheadedness. Denies any vaginal bleeding or discharge, denies dysuria, denies being sexually active at this time. Says she cannot be pregnant based on no recent intercourse back to the beginning of the year. She says she is also lost approximately 40 pounds over the last several months also. Family History   Problem Relation Age of Onset    Diabetes Maternal Grandmother     Diabetes Paternal Grandmother     Stroke Other     Thyroid Disease Neg Hx      Past Surgical History:   Procedure Laterality Date    ABDOMEN SURGERY N/A 5/9/2019    INCISION AND DRAINAGE OF SUPRAPUBIC ABSESS performed by Demetra Villarreal MD at [address]      last yr       Review of Systems   Constitutional: Negative for chills and fever. Respiratory: Negative for chest tightness and shortness of breath. Cardiovascular: Negative for chest pain. Gastrointestinal: Positive for abdominal pain and constipation. Negative for diarrhea, nausea and vomiting. Endocrine: Positive for polydipsia and polyuria. Neurological: Positive for light-headedness. All other systems reviewed and are negative. Physical Exam  Constitutional:       General: She is not in acute distress. Appearance: She is well-developed.       Comments: Petite   HENT:      Head: Normocephalic and atraumatic. Eyes:      Conjunctiva/sclera: Conjunctivae normal.      Pupils: Pupils are equal, round, and reactive to light. Neck:      Musculoskeletal: Normal range of motion. Thyroid: No thyromegaly. Cardiovascular:      Rate and Rhythm: Normal rate and regular rhythm. Pulmonary:      Effort: Pulmonary effort is normal. No respiratory distress. Breath sounds: Normal breath sounds. Abdominal:      General: There is no distension. Palpations: Abdomen is soft. Tenderness: There is no abdominal tenderness. There is no guarding or rebound. Musculoskeletal: Normal range of motion. General: No tenderness. Skin:     General: Skin is warm and dry. Findings: No erythema. Neurological:      Mental Status: She is alert and oriented to person, place, and time. Cranial Nerves: No cranial nerve deficit. Coordination: Coordination normal.          Procedures     MDM     ED Course as of Nov 03 2016   Yann Adams Nov 01, 2020 1950 Patient says she is feeling fine, her abdominal pain is gone, she does not have any discomfort at this point. States she is hungry and wants to eat something.    [SO]      ED Course User Index  [SO] Lauren PerryDO      ED Course as of Nov 03 2016   Sun Nov 01, 2020   1950 Patient says she is feeling fine, her abdominal pain is gone, she does not have any discomfort at this point. States she is hungry and wants to eat something.    [SO]      ED Course User Index  [SO] Lauren Perry, DO       --------------------------------------------- PAST HISTORY ---------------------------------------------  Past Medical History:  has a past medical history of Bleeding at insertion site, Common femoral artery injury, right, initial encounter, Depressive disorder, and DM [facility] Past Surgical History:  has a past surgical history that includes Abdomen surgery (N/A, 5/9/2019) and Bartholin gland cyst excision.     Social History: Acid, Sepsis 3.8 (H) 0.5 - 1.9 mmol/L   SPECIMEN REJECTION   Result Value Ref Range    Rejected Test VPH     Reason for Rejection see below    SPECIMEN REJECTION   Result Value Ref Range    Rejected Test VPH     Reason for Rejection see below    Lactate, Sepsis   Result Value Ref Range    Lactic Acid, Sepsis 1.2 0.5 - 1.9 mmol/L   POCT Glucose   Result Value Ref Range    Meter Glucose 184 (H) 74 - 99 mg/dL   POCT Venous   Result Value Ref Range    POC Sodium 138 132 - 146 mmol/L    POC Potassium 3.5 3.5 - 5.0 mmol/L    POC Chloride 101 100 - 108 mmol/L    POC Glucose 187 (H) 74 - 99 mg/dl    POC Creatinine 0.7 0.5 - 1.0 mg/dL    GFR Non-African American >60 >=60 mL/min/1.73    GFR  >60     Performed on SEE BELOW        Radiology:  CT ABDOMEN PELVIS W IV CONTRAST Additional Contrast? None   Final Result   Mild hepatic steatosis. Otherwise, unremarkable contrast enhanced CT abdomen and pelvis examination.             ------------------------- NURSING NOTES AND VITALS REVIEWED ---------------------------  Date / Time Roomed:  11/1/2020  4:25 PM  ED Bed Assignment:  16/16    The nursing notes within the ED encounter and vital signs as below have been reviewed. BP (!) 140/90 Comment: manual   Pulse 110   Temp 97.5 °F (36.4 °C) (Oral)   Resp 18   LMP  (LMP Unknown)   SpO2 100%   Oxygen Saturation Interpretation: Normal      ------------------------------------------ PROGRESS NOTES ------------------------------------------  I have spoken with the patient and discussed todays results, in addition to providing specific details for the plan of care and counseling regarding the diagnosis and prognosis. Their questions are answered at this time and they are agreeable with the plan. I discussed at length with them reasons for immediate return here for re evaluation. They will followup with primary care by calling their office tomorrow.     The initial blood pressure was low, once the appropriately sized blood pressure cuff was placed the blood pressure improved. She was also given IV fluids, history of DKA. She was symptomatic complaining of lightheadedness. However, after complete evaluation patient found to be in no significant distress. Able to be discharged home to follow-up with, doctor. Understands return to the ED for any problems or issues. She does describe quite a bit of a weight loss, family physician will help manage most of this. Otherwise she will return to the ED for any issues. --------------------------------- ADDITIONAL PROVIDER NOTES ---------------------------------  At this time the patient is without objective evidence of an acute process requiring hospitalization or inpatient management. They have remained hemodynamically stable throughout their entire ED visit and are stable for discharge with outpatient follow-up. The plan has been discussed in detail and they are aware of the specific conditions for emergent return, as well as the importance of follow-up. Discharge Medication List as of 11/1/2020 11:08 PM      START taking these medications    Details   polyethylene glycol (MIRALAX) 17 GM/SCOOP powder Take 17 g by mouth daily for 14 days. Dispense QS, and no refills, Disp-1 Bottle,R-0Print             Diagnosis:  1. Constipation, unspecified constipation type        Disposition:  Patient's disposition: Discharge to home  Patient's condition is stable.            Lesly Shook DO  11/03/20 2017

## 2020-11-01 NOTE — ED NOTES
Verbal order from attending physician for 2 L of fluid started      Jazmine Gamino, PAGE  11/01/20 0893

## 2020-11-01 NOTE — ED NOTES
Lab called to check progress on serum pregnancy test, they stated it will result in about ten minutes      Aure Figueroa RN  11/01/20 [id number]

## 2020-11-02 LAB — BETA-HYDROXYBUTYRATE: 0.45 MMOL/L (ref 0.02–0.27)

## 2020-11-02 NOTE — ED NOTES
[facility] redrawn and sent per lab request      Mechelle Linares RN  11/01/20 [id number]

## 2020-11-04 LAB
EKG ATRIAL RATE: 125 BPM
EKG P AXIS: 82 DEGREES
EKG P-R INTERVAL: 132 MS
EKG Q-T INTERVAL: 296 MS
EKG QRS DURATION: 66 MS
EKG QTC CALCULATION (BAZETT): 427 MS
EKG R AXIS: 59 DEGREES
EKG T AXIS: -23 DEGREES
EKG VENTRICULAR RATE: 125 BPM

## 2020-11-04 PROCEDURE — 93010 ELECTROCARDIOGRAM REPORT: CPT | Performed by: INTERNAL MEDICINE

## 2020-11-06 LAB
BLOOD CULTURE, ROUTINE: NORMAL
CULTURE, BLOOD 2: NORMAL

## 2020-11-10 ENCOUNTER — APPOINTMENT (OUTPATIENT)
Dept: GENERAL RADIOLOGY | Age: 25
End: 2020-11-10
Payer: COMMERCIAL

## 2020-11-10 ENCOUNTER — HOSPITAL ENCOUNTER (EMERGENCY)
Age: 25
Discharge: HOME | End: 2020-11-10
Attending: EMERGENCY MEDICINE
Payer: COMMERCIAL

## 2020-11-10 ENCOUNTER — APPOINTMENT (OUTPATIENT)
Dept: CT IMAGING | Age: 25
End: 2020-11-10
Payer: COMMERCIAL

## 2020-11-10 VITALS
OXYGEN SATURATION: 100 % | HEART RATE: 86 BPM | TEMPERATURE: 97.1 F | RESPIRATION RATE: 16 BRPM | SYSTOLIC BLOOD PRESSURE: 124 MMHG | DIASTOLIC BLOOD PRESSURE: 86 MMHG

## 2020-11-10 LAB
ALBUMIN SERPL-MCNC: 3.7 G/DL (ref 3.5–5.2)
ALP BLD-CCNC: 81 U/L (ref 35–104)
ALT SERPL-CCNC: 32 U/L (ref 0–32)
ANION GAP SERPL CALCULATED.3IONS-SCNC: 12 MMOL/L (ref 7–16)
AST SERPL-CCNC: 23 U/L (ref 0–31)
B.E.: -4.8 MMOL/L
BASOPHILS ABSOLUTE: 0.04 E9/L (ref 0–0.2)
BASOPHILS RELATIVE PERCENT: 0.5 % (ref 0–2)
BETA-HYDROXYBUTYRATE: 2.21 MMOL/L (ref 0.02–0.27)
BILIRUB SERPL-MCNC: <0.2 MG/DL (ref 0–1.2)
BILIRUBIN URINE: NEGATIVE
BLOOD, URINE: NEGATIVE
BUN BLDV-MCNC: 18 MG/DL (ref 6–20)
CALCIUM SERPL-MCNC: 8.8 MG/DL (ref 8.6–10.2)
CHLORIDE BLD-SCNC: 103 MMOL/L (ref 98–107)
CLARITY: CLEAR
CO2: 19 MMOL/L (ref 22–29)
COHB: 0.2 % (ref 0–1.5)
COLOR: YELLOW
CREAT SERPL-MCNC: 0.5 MG/DL (ref 0.5–1)
CRITICAL: ABNORMAL
DATE ANALYZED: ABNORMAL
DATE OF COLLECTION: ABNORMAL
EOSINOPHILS ABSOLUTE: 0.02 E9/L (ref 0.05–0.5)
EOSINOPHILS RELATIVE PERCENT: 0.3 % (ref 0–6)
GFR AFRICAN AMERICAN: >60
GFR NON-AFRICAN AMERICAN: >60 ML/MIN/1.73
GLUCOSE BLD-MCNC: 215 MG/DL (ref 74–99)
GLUCOSE URINE: >=1000 MG/DL
HCG, URINE, POC: NEGATIVE
HCO3: 21.9 MMOL/L
HCT VFR BLD CALC: 34.5 % (ref 34–48)
HEMOGLOBIN: 11.5 G/DL (ref 11.5–15.5)
HHB: 73 %
IMMATURE GRANULOCYTES #: 0.03 E9/L
IMMATURE GRANULOCYTES %: 0.4 % (ref 0–5)
KETONES, URINE: 40 MG/DL
LAB: ABNORMAL
LACTIC ACID, SEPSIS: 4.2 MMOL/L (ref 0.5–1.9)
LACTIC ACID: 1.3 MMOL/L (ref 0.5–2.2)
LEUKOCYTE ESTERASE, URINE: NEGATIVE
LYMPHOCYTES ABSOLUTE: 3.15 E9/L (ref 1.5–4)
LYMPHOCYTES RELATIVE PERCENT: 42.7 % (ref 20–42)
Lab: ABNORMAL
Lab: NORMAL
MCH RBC QN AUTO: 26.8 PG (ref 26–35)
MCHC RBC AUTO-ENTMCNC: 33.3 % (ref 32–34.5)
MCV RBC AUTO: 80.4 FL (ref 80–99.9)
METER GLUCOSE: 117 MG/DL (ref 74–99)
METER GLUCOSE: 67 MG/DL (ref 74–99)
METHB: 0.5 % (ref 0–1.5)
MODE: ABNORMAL
MONOCYTES ABSOLUTE: 0.44 E9/L (ref 0.1–0.95)
MONOCYTES RELATIVE PERCENT: 6 % (ref 2–12)
NEGATIVE QC PASS/FAIL: NORMAL
NEUTROPHILS ABSOLUTE: 3.7 E9/L (ref 1.8–7.3)
NEUTROPHILS RELATIVE PERCENT: 50.1 % (ref 43–80)
NITRITE, URINE: NEGATIVE
O2 SATURATION: 26.5 %
O2HB: 26.3 %
OPERATOR ID: 925
PATIENT TEMP: 37 C
PCO2: 47.5 MMHG (ref 40–52)
PDW BLD-RTO: 13.7 FL (ref 11.5–15)
PH BLOOD GAS: 7.28 (ref 7.3–7.42)
PH UA: 6 (ref 5–9)
PLATELET # BLD: 418 E9/L (ref 130–450)
PMV BLD AUTO: 11.3 FL (ref 7–12)
PO2: 20.8 MMHG (ref 30–50)
POSITIVE QC PASS/FAIL: NORMAL
POTASSIUM SERPL-SCNC: 4 MMOL/L (ref 3.5–5)
PROTEIN UA: NEGATIVE MG/DL
RBC # BLD: 4.29 E12/L (ref 3.5–5.5)
REASON FOR REJECTION: NORMAL
REJECTED TEST: NORMAL
SARS-COV-2, NAAT: NOT DETECTED
SODIUM BLD-SCNC: 134 MMOL/L (ref 132–146)
SOURCE, BLOOD GAS: ABNORMAL
SPECIFIC GRAVITY UA: 1.01 (ref 1–1.03)
THB: 11.8 G/DL (ref 11.5–16.5)
TIME ANALYZED: 1038
TOTAL PROTEIN: 6.8 G/DL (ref 6.4–8.3)
TROPONIN: <0.01 NG/ML (ref 0–0.03)
UROBILINOGEN, URINE: 0.2 E.U./DL
WBC # BLD: 7.4 E9/L (ref 4.5–11.5)

## 2020-11-10 PROCEDURE — 74176 CT ABD & PELVIS W/O CONTRAST: CPT

## 2020-11-10 PROCEDURE — 82805 BLOOD GASES W/O2 SATURATION: CPT

## 2020-11-10 PROCEDURE — U0002 COVID-19 LAB TEST NON-CDC: HCPCS

## 2020-11-10 PROCEDURE — 71045 X-RAY EXAM CHEST 1 VIEW: CPT

## 2020-11-10 PROCEDURE — 83605 ASSAY OF LACTIC ACID: CPT

## 2020-11-10 PROCEDURE — 96360 HYDRATION IV INFUSION INIT: CPT

## 2020-11-10 PROCEDURE — U0003 INFECTIOUS AGENT DETECTION BY NUCLEIC ACID (DNA OR RNA); SEVERE ACUTE RESPIRATORY SYNDROME CORONAVIRUS 2 (SARS-COV-2) (CORONAVIRUS DISEASE [COVID-19]), AMPLIFIED PROBE TECHNIQUE, MAKING USE OF HIGH THROUGHPUT TECHNOLOGIES AS DESCRIBED BY CMS-2020-01-R: HCPCS

## 2020-11-10 PROCEDURE — 36415 COLL VENOUS BLD VENIPUNCTURE: CPT

## 2020-11-10 PROCEDURE — 93005 ELECTROCARDIOGRAM TRACING: CPT | Performed by: EMERGENCY MEDICINE

## 2020-11-10 PROCEDURE — 85025 COMPLETE CBC W/AUTO DIFF WBC: CPT

## 2020-11-10 PROCEDURE — 82962 GLUCOSE BLOOD TEST: CPT

## 2020-11-10 PROCEDURE — 6360000004 HC RX CONTRAST MEDICATION: Performed by: STUDENT IN AN ORGANIZED HEALTH CARE EDUCATION/TRAINING PROGRAM

## 2020-11-10 PROCEDURE — 81003 URINALYSIS AUTO W/O SCOPE: CPT

## 2020-11-10 PROCEDURE — 2580000003 HC RX 258: Performed by: EMERGENCY MEDICINE

## 2020-11-10 PROCEDURE — 96361 HYDRATE IV INFUSION ADD-ON: CPT

## 2020-11-10 PROCEDURE — 80053 COMPREHEN METABOLIC PANEL: CPT

## 2020-11-10 PROCEDURE — 82010 KETONE BODYS QUAN: CPT

## 2020-11-10 PROCEDURE — 96375 TX/PRO/DX INJ NEW DRUG ADDON: CPT

## 2020-11-10 PROCEDURE — 73502 X-RAY EXAM HIP UNI 2-3 VIEWS: CPT

## 2020-11-10 PROCEDURE — 99285 EMERGENCY DEPT VISIT HI MDM: CPT

## 2020-11-10 PROCEDURE — 87040 BLOOD CULTURE FOR BACTERIA: CPT

## 2020-11-10 PROCEDURE — 84484 ASSAY OF TROPONIN QUANT: CPT

## 2020-11-10 PROCEDURE — 96374 THER/PROPH/DIAG INJ IV PUSH: CPT

## 2020-11-10 PROCEDURE — 6360000002 HC RX W HCPCS: Performed by: EMERGENCY MEDICINE

## 2020-11-10 RX ORDER — IBUPROFEN 200 MG
200 TABLET ORAL EVERY 6 HOURS PRN
Status: ON HOLD | COMMUNITY
End: 2020-12-17 | Stop reason: HOSPADM

## 2020-11-10 RX ORDER — 0.9 % SODIUM CHLORIDE 0.9 %
1000 INTRAVENOUS SOLUTION INTRAVENOUS ONCE
Status: COMPLETED | OUTPATIENT
Start: 2020-11-10 | End: 2020-11-10

## 2020-11-10 RX ORDER — FENTANYL CITRATE 50 UG/ML
25 INJECTION, SOLUTION INTRAMUSCULAR; INTRAVENOUS ONCE
Status: COMPLETED | OUTPATIENT
Start: 2020-11-10 | End: 2020-11-10

## 2020-11-10 RX ORDER — INSULIN GLARGINE 100 [IU]/ML
25 INJECTION, SOLUTION SUBCUTANEOUS NIGHTLY
COMMUNITY
End: 2020-12-31 | Stop reason: SDUPTHER

## 2020-11-10 RX ORDER — KETOROLAC TROMETHAMINE 30 MG/ML
15 INJECTION, SOLUTION INTRAMUSCULAR; INTRAVENOUS ONCE
Status: DISCONTINUED | OUTPATIENT
Start: 2020-11-10 | End: 2020-11-11 | Stop reason: HOSPADM

## 2020-11-10 RX ORDER — SODIUM CHLORIDE 9 MG/ML
INJECTION, SOLUTION INTRAVENOUS CONTINUOUS
Status: DISCONTINUED | OUTPATIENT
Start: 2020-11-10 | End: 2020-11-11 | Stop reason: HOSPADM

## 2020-11-10 RX ADMIN — SODIUM CHLORIDE 1000 ML: 9 INJECTION, SOLUTION INTRAVENOUS at 13:13

## 2020-11-10 RX ADMIN — SODIUM CHLORIDE 1000 ML: 9 INJECTION, SOLUTION INTRAVENOUS at 11:44

## 2020-11-10 RX ADMIN — IOHEXOL 50 ML: 240 INJECTION, SOLUTION INTRATHECAL; INTRAVASCULAR; INTRAVENOUS; ORAL at 17:54

## 2020-11-10 RX ADMIN — SODIUM CHLORIDE: 9 INJECTION, SOLUTION INTRAVENOUS at 15:52

## 2020-11-10 RX ADMIN — FENTANYL CITRATE 25 MCG: 50 INJECTION, SOLUTION INTRAMUSCULAR; INTRAVENOUS at 13:11

## 2020-11-10 RX ADMIN — SODIUM CHLORIDE 1000 ML: 9 INJECTION, SOLUTION INTRAVENOUS at 10:18

## 2020-11-10 ASSESSMENT — ENCOUNTER SYMPTOMS
FACIAL SWELLING: 0
PHOTOPHOBIA: 0
CONSTIPATION: 0
NAUSEA: 0
COLOR CHANGE: 0
ABDOMINAL DISTENTION: 0
COUGH: 0
CHEST TIGHTNESS: 0
EYE PAIN: 0
DIARRHEA: 0
EYE REDNESS: 0
RHINORRHEA: 0
SHORTNESS OF BREATH: 0

## 2020-11-10 ASSESSMENT — PAIN SCALES - GENERAL
PAINLEVEL_OUTOF10: 7
PAINLEVEL_OUTOF10: 10

## 2020-11-10 NOTE — ED NOTES
Ems states that bgl was 408. Pt states that she fell out of bed and states woke up with her grandmother standing over her. Reports lumbar / hip pains. Reports that she has been having issues with constipation and was going to get up but had an episode where she moved her bowels in bed. Pt states she just felt to weak. States diarrhea. States that her grandmother cleaned her up pta. Pt states generalized weakness. Pt presents lethargic. Speech clear. Dr at bedside.       Mauricio Cedillo RN  11/10/20 [id number]

## 2020-11-10 NOTE — ED PROVIDER NOTES
Patient is 79-year-old female presented emergency department due to having an episode of feeling fatigued recently and according to her grandmother that she fell out of bed this morning. Patient states she feels fatigued and lightheaded. She also states she does not feel like she is in DKA but she does feel slightly dehydrated. Patient denies any nausea, vomiting, diarrhea, abdominal pain at this time. Patient was found to be hyperglycemic with glucose in the 400s at home and so she came to the emergency department evaluated. The history is provided by the patient. No  was used. Symptoms are worsened by nothing       Symptoms are improved by nothing    Denies any associated chest pain, abdominal pain, fever, chills, cough    Review of Systems   Constitutional: Positive for activity change and fatigue. Negative for chills and diaphoresis. HENT: Negative for congestion, facial swelling, hearing loss and rhinorrhea. Eyes: Negative for photophobia, pain and redness. Respiratory: Negative for cough, chest tightness and shortness of breath. Cardiovascular: Negative for chest pain, palpitations and leg swelling. Gastrointestinal: Negative for abdominal distention, constipation, diarrhea and nausea. Genitourinary: Negative for difficulty urinating, dysuria, frequency and hematuria. Musculoskeletal: Negative for arthralgias, joint swelling and myalgias. Skin: Negative for color change, pallor and rash. Neurological: Positive for light-headedness. Negative for numbness and headaches. Hematological: Negative for adenopathy. Physical Exam  Vitals signs and nursing note reviewed. Constitutional:       General: She is not in acute distress. Appearance: She is well-developed. She is ill-appearing. HENT:      Head: Normocephalic and atraumatic. Right Ear: External ear normal.      Left Ear: External ear normal.      Nose: Nose normal. No congestion. Mouth/Throat:      Mouth: Mucous membranes are dry. Pharynx: Oropharynx is clear. No oropharyngeal exudate. Eyes:      Pupils: Pupils are equal, round, and reactive to light. Neck:      Musculoskeletal: Normal range of motion and neck supple. Cardiovascular:      Rate and Rhythm: Regular rhythm. Tachycardia present. Pulses: Normal pulses. Heart sounds: Normal heart sounds. No murmur. No friction rub. No gallop. Pulmonary:      Effort: Pulmonary effort is normal. No respiratory distress. Breath sounds: Normal breath sounds. No wheezing or rales. Abdominal:      General: Bowel sounds are normal.      Palpations: Abdomen is soft. Tenderness: There is no abdominal tenderness. There is no guarding or rebound. Musculoskeletal:      Right lower leg: No edema. Left lower leg: No edema. Skin:     General: Skin is warm and dry. Neurological:      Mental Status: She is alert and oriented to person, place, and time. Cranial Nerves: No cranial nerve deficit. Coordination: Coordination normal.          Procedures         MDM  Number of Diagnoses or Management Options  Dehydration:   Type 1 diabetes mellitus with other specified complication [facility]):   Diagnosis management comments: Patient is a 28-year-old female who presents today for concern of DKA as patient is a type I diabetic. On physical exam patient does appear to be dry. Lab work and imaging was obtained. Lab work shows a WBC of 7.4, hemoglobin is 11.5. Lactic is elevated at 4.2. Blood gas shows a pH of 7.28, PCO2 47, O2 of 20. COVID-19 is negative. Patient has a glucose of 215, anion gap 12, CO2 of 19. Creatinine is normal at 0.5. Beta hydroxy mildly elevated 2.2. Repeat lactic acid after fluids is 1.3. Urine is negative. CT of the abdomen was obtained which shows a large stool volume with no evidence of obstruction or acute inflammatory process.   Remainder of work-up is normal.  Repeat examination shows patient resting company in bed, no acute distress. Patient symptoms have improved after fluids. Patient is able to tolerate p.o. fluids. She will be discharged home instructed follow-up PCP. She understands and agrees with this plan. Amount and/or Complexity of Data Reviewed  Clinical lab tests: reviewed  Tests in the radiology section of CPT®: reviewed                  --------------------------------------------- PAST HISTORY ---------------------------------------------  Past Medical History:  has a past medical history of Bleeding at insertion site, Common femoral artery injury, right, initial encounter, Depressive disorder, and DM type 1 (diabetes mellitus, type 1) ([facility] Past Surgical History:  has a past surgical history that includes Abdomen surgery (N/A, 5/9/2019) and Bartholin gland cyst excision. Social History:  reports that she has never smoked. She has never used smokeless tobacco. She reports that she does not drink alcohol or use drugs. Family History: family history includes Diabetes in her maternal grandmother and paternal grandmother; Stroke in an other family member. The patients home medications have been reviewed. Allergies: Patient has no known allergies.     -------------------------------------------------- RESULTS -------------------------------------------------  Labs:  Results for orders placed or performed during the hospital encounter of 11/10/20   CBC Auto Differential   Result Value Ref Range    WBC 7.4 4.5 - 11.5 E9/L    RBC 4.29 3.50 - 5.50 E12/L    Hemoglobin 11.5 11.5 - 15.5 g/dL    Hematocrit 34.5 34.0 - 48.0 %    MCV 80.4 80.0 - 99.9 fL    MCH 26.8 26.0 - 35.0 pg    MCHC 33.3 32.0 - 34.5 %    RDW 13.7 11.5 - 15.0 fL    Platelets 859 696 - 842 E9/L    MPV 11.3 7.0 - 12.0 fL    Neutrophils % 50.1 43.0 - 80.0 %    Immature Granulocytes % 0.4 0.0 - 5.0 %    Lymphocytes % 42.7 (H) 20.0 - 42.0 %    Monocytes % 6.0 2.0 - 12.0 %    Eosinophils % 0.3 0.0 - 6.0 % mg/dL    BUN 18 6 - 20 mg/dL    CREATININE 0.5 0.5 - 1.0 mg/dL    GFR Non-African American >60 >=60 mL/min/1.73    GFR African American >60     Calcium 8.8 8.6 - 10.2 mg/dL    Total Protein 6.8 6.4 - 8.3 g/dL    Alb 3.7 3.5 - 5.2 g/dL    Total Bilirubin <0.2 0.0 - 1.2 mg/dL    Alkaline Phosphatase 81 35 - 104 U/L    ALT 32 0 - 32 U/L    AST 23 0 - 31 U/L   Beta-Hydroxybutyrate   Result Value Ref Range    Beta-Hydroxybutyrate 2.21 (H) 0.02 - 0.27 mmol/L   Troponin   Result Value Ref Range    Troponin <0.01 0.00 - 0.03 ng/mL   Lactic acid, plasma   Result Value Ref Range    Lactic Acid 1.3 0.5 - 2.2 mmol/L   POC Pregnancy Urine   Result Value Ref Range    HCG, Urine, POC Negative Negative    Lot Number [id number]     Positive QC Pass/Fail Pass     Negative QC Pass/Fail Pass    POCT Glucose   Result Value Ref Range    Meter Glucose 67 (L) 74 - 99 mg/dL   POCT Glucose   Result Value Ref Range    Meter Glucose 117 (H) 74 - 99 mg/dL       Radiology:  CT ABDOMEN PELVIS WO CONTRAST Additional Contrast? Oral   Preliminary Result   Large stool volume. No bowel obstruction. No acute inflammatory process. No hydronephrosis. No renal calculus. XR HIP LEFT (2-3 VIEWS)   Final Result   No acute abnormality of the hip. XR CHEST PORTABLE   Final Result   No acute process             ------------------------- NURSING NOTES AND VITALS REVIEWED ---------------------------  Date / Time Roomed:  11/10/2020  9:11 AM  ED Bed Assignment:  [id number]    The nursing notes within the ED encounter and vital signs as below have been reviewed.    /87   Pulse 107   Temp 96.4 °F (35.8 °C)   Resp 16   LMP  (LMP Unknown)   SpO2 100%   Oxygen Saturation Interpretation: Normal      ------------------------------------------ PROGRESS NOTES ------------------------------------------  I have spoken with the patient and discussed todays results, in addition to providing specific details for the plan of care and counseling regarding the diagnosis and prognosis. Their questions are answered at this time and they are agreeable with the plan. I discussed at length with them reasons for immediate return here for re evaluation. They will followup with primary care by calling their office tomorrow. --------------------------------- ADDITIONAL PROVIDER NOTES ---------------------------------  At this time the patient is without objective evidence of an acute process requiring hospitalization or inpatient management. They have remained hemodynamically stable throughout their entire ED visit and are stable for discharge with outpatient follow-up. The plan has been discussed in detail and they are aware of the specific conditions for emergent return, as well as the importance of follow-up. New Prescriptions    No medications on file       Diagnosis:  1. Dehydration    2. Type [facility] diabetes mellitus with other specified [facility]        Disposition:  Patient's disposition: Discharge to home  Patient's condition is stable.              Jeferson Snell DO  Resident  11/10/20 [id number]

## 2020-11-10 NOTE — ED NOTES
Bed: 18A-18  Expected date:   Expected time:   Means of arrival:   Comments:  Hebert Medel RN  11/10/20 [id number]

## 2020-11-10 NOTE — ED NOTES
1st set of Blood Cultures taken using aseptic technique from the right wrist. Tops of blood culture bottles cleansed with alcohol prep pad for 10 seconds and let air dry. Skin cleansed with alcohol pad and then cleansed with prevantics sticks  for 30 seconds and let air dry. Sites remained sterile throughout process.       Silvia Sosa RN  11/10/20 1017

## 2020-11-11 LAB
EKG ATRIAL RATE: 113 BPM
EKG P AXIS: 64 DEGREES
EKG P-R INTERVAL: 130 MS
EKG Q-T INTERVAL: 330 MS
EKG QRS DURATION: 66 MS
EKG QTC CALCULATION (BAZETT): 452 MS
EKG R AXIS: 37 DEGREES
EKG T AXIS: 7 DEGREES
EKG VENTRICULAR RATE: 113 BPM

## 2020-11-11 PROCEDURE — 93010 ELECTROCARDIOGRAM REPORT: CPT | Performed by: INTERNAL MEDICINE

## 2020-11-12 LAB
SARS-COV-2: NOT DETECTED
SOURCE: NORMAL

## 2020-11-15 LAB
BLOOD CULTURE, ROUTINE: NORMAL
CULTURE, BLOOD 2: NORMAL

## 2020-11-21 NOTE — DISCHARGE SUMMARY
Hospital Medicine Discharge Summary    Patient ID: Artemio Alford      Patient's PCP: Bertin Lees DO    Admit Date: 10/17/2020     Discharge Date: 10/20/2020  *  Admitting Physician: Teodora Greene DO     Discharge Physician: Lori New MD     Discharge Diagnoses: Active Hospital Problems    Diagnosis Date Noted    DKA, type 1, not at goal [facility]) [E10.10] 08/31/2020       The patient was seen and examined on day of discharge and this discharge summary is in conjunction with any daily progress note from day of discharge. Hospital Course:   Pt with hx of dm1 admitted in DKA. Initially started on insulin drip and DKA protocol, transitioned to subQ insulin. Stabilized. Discharged to home in stable condition on 10/20/20. Discussed importance of compliance with insulin regimen. Exam:     BP (!) 144/100   Pulse 116   Temp 97.2 °F (36.2 °C) (Tympanic)   Resp 19   Ht 5' 1\" (1.549 m)   Wt 93 lb 4.8 oz (42.3 kg)   LMP 05/17/2020   SpO2 98%   BMI 17.63 kg/m²     General appearance: No apparent distress, appears stated age and cooperative. HEENT: Pupils equal, round, and reactive to light. Conjunctivae/corneas clear. Neck: Supple, with full range of motion. No jugular venous distention. Trachea midline. Respiratory:  Normal respiratory effort. Clear to auscultation, bilaterally without Rales/Wheezes/Rhonchi. Cardiovascular: Regular rate and rhythm with normal S1/S2 without murmurs, rubs or gallops. Abdomen: Soft, non-tender, non-distended with normal bowel sounds. Musculoskeletal: No clubbing, cyanosis or edema bilaterally. Full range of motion without deformity. Skin: Skin color, texture, turgor normal.  No rashes or lesions. Neurologic:  Neurovascularly intact without any focal sensory/motor deficits.  Cranial nerves: II-XII intact, grossly non-focal.  Psychiatric: Alert and oriented, thought content appropriate, normal insight      Consults:     IP CONSULT TO INTERNAL MEDICINE  IP CONSULT TO CRITICAL CARE  IP CONSULT TO DIABETES EDUCATOR  IP CONSULT TO ENDOCRINOLOGY    Significant Diagnostic Studies:     XR CHEST PORTABLE   Final Result   Left IJ line with distal tip in the SVC. No pneumothorax. No acute cardiopulmonary disease. Disposition:  Home      Discharge Instructions/Follow-up:  Keep scheduled follow up appointments. Take medications as prescribed. Code Status:  Prior     Activity: activity as tolerated    Diet: diabetic diet    Labs: For convenience and continuity at follow-up the following most recent labs are provided:      CBC:    Lab Results   Component Value Date    WBC 7.4 11/10/2020    HGB 11.5 11/10/2020    HCT 34.5 11/10/2020     11/10/2020       Renal:    Lab Results   Component Value Date     11/10/2020    K 4.0 11/10/2020    K 5.1 10/10/2020     11/10/2020    CO2 19 11/10/2020    BUN 18 11/10/2020    CREATININE 0.5 11/10/2020    CALCIUM 8.8 11/10/2020    PHOS 3.4 10/20/2020       Discharge Medications:     Discharge Medication List as of 10/20/2020  6:35 PM           Details   insulin aspart (NOVOLOG) 100 UNIT/ML injection vial Per pump- restart pump tomorrow morning, Disp-1 vial,R-0NO PRINT      docusate sodium (COLACE, DULCOLAX) 100 MG CAPS Take 100 mg by mouth daily Hold for diarrhea, Disp-30 capsule,R-0Normal      mirtazapine (REMERON) 15 MG tablet Take 1 tablet by mouth nightly, Disp-30 tablet,R-0Normal      ketorolac (TORADOL) 10 MG tablet Take 1 tablet by mouth every 6 hours as needed for Pain, Disp-16 tablet,R-0Print      naproxen (NAPROSYN) 250 MG tablet Take 1 tablet by mouth 2 times daily (with meals), Disp-30 tablet,R-0Print      metoclopramide (REGLAN) 10 MG tablet Take 1 tablet by mouth 4 times daily, Disp-30 tablet,R-3Print      !! blood glucose test strips (CONTOUR NEXT TEST) strip [employer] Contour test strips.  Checks 4 times/day before meals and at bedtime and as needed for symptoms of irregular blood glucose, Disp-250 strip,R-5Normal      pantoprazole (PROTONIX) 40 MG tablet Take 1 tablet by mouth 2 times daily (before meals), Disp-60 tablet,R-0Normal      !! blood glucose test strips (CONTOUR NEXT TEST) strip [employer] Contour test strips. Checks 4 times/day before meals and at bedtime and as needed for symptoms of irregular blood glucose, Disp-250 strip,R-5Normal      doxepin (ZONALON) 5 % cream APPLY ONE GRAM (ONE PUMP) EXTERNALLY THREE TIMES A DAY TO PAIN AREA TO LOWER BACK , Disp-90 g,R-0, Normal      Gel Base (VERSAPRO) GEL Disp-120 g,R-0, Normal      acetone, urine, test strip Use daily as directed if bs >250 x2 or illness, Disp-30 strip, R-0Print       !! - Potential duplicate medications found. Please discuss with provider. Time Spent on discharge is more than 45 minutes in the examination, evaluation, counseling and review of medications and discharge plan.       Signed:    Clarissa Shelton MD   11/21/2020

## 2020-12-06 ENCOUNTER — HOSPITAL ENCOUNTER (EMERGENCY)
Age: 25
Discharge: HOME | End: 2020-12-07
Attending: EMERGENCY MEDICINE
Payer: COMMERCIAL

## 2020-12-06 ENCOUNTER — APPOINTMENT (OUTPATIENT)
Dept: GENERAL RADIOLOGY | Age: 25
End: 2020-12-06
Payer: COMMERCIAL

## 2020-12-06 LAB
ANION GAP SERPL CALCULATED.3IONS-SCNC: 12 MMOL/L (ref 7–16)
BASOPHILS ABSOLUTE: 0.04 E9/L (ref 0–0.2)
BASOPHILS RELATIVE PERCENT: 0.7 % (ref 0–2)
BUN BLDV-MCNC: 23 MG/DL (ref 6–20)
CALCIUM SERPL-MCNC: 9.7 MG/DL (ref 8.6–10.2)
CHLORIDE BLD-SCNC: 91 MMOL/L (ref 98–107)
CHP ED QC CHECK: NORMAL
CHP ED QC CHECK: YES
CO2: 25 MMOL/L (ref 22–29)
CREAT SERPL-MCNC: 0.7 MG/DL (ref 0.5–1)
EOSINOPHILS ABSOLUTE: 0.04 E9/L (ref 0.05–0.5)
EOSINOPHILS RELATIVE PERCENT: 0.7 % (ref 0–6)
GFR AFRICAN AMERICAN: >60
GFR NON-AFRICAN AMERICAN: >60 ML/MIN/1.73
GLUCOSE BLD-MCNC: 179 MG/DL
GLUCOSE BLD-MCNC: 360 MG/DL
GLUCOSE BLD-MCNC: 522 MG/DL (ref 74–99)
HCT VFR BLD CALC: 36.5 % (ref 34–48)
HEMOGLOBIN: 11.6 G/DL (ref 11.5–15.5)
IMMATURE GRANULOCYTES #: 0.01 E9/L
IMMATURE GRANULOCYTES %: 0.2 % (ref 0–5)
LYMPHOCYTES ABSOLUTE: 2.73 E9/L (ref 1.5–4)
LYMPHOCYTES RELATIVE PERCENT: 45.6 % (ref 20–42)
MCH RBC QN AUTO: 25.1 PG (ref 26–35)
MCHC RBC AUTO-ENTMCNC: 31.8 % (ref 32–34.5)
MCV RBC AUTO: 79 FL (ref 80–99.9)
METER GLUCOSE: 179 MG/DL (ref 74–99)
METER GLUCOSE: 360 MG/DL (ref 74–99)
METER GLUCOSE: >500 MG/DL (ref 74–99)
MONOCYTES ABSOLUTE: 0.34 E9/L (ref 0.1–0.95)
MONOCYTES RELATIVE PERCENT: 5.7 % (ref 2–12)
NEUTROPHILS ABSOLUTE: 2.83 E9/L (ref 1.8–7.3)
NEUTROPHILS RELATIVE PERCENT: 47.1 % (ref 43–80)
PDW BLD-RTO: 13.7 FL (ref 11.5–15)
PLATELET # BLD: 311 E9/L (ref 130–450)
PMV BLD AUTO: 12 FL (ref 7–12)
POTASSIUM REFLEX MAGNESIUM: 4.4 MMOL/L (ref 3.5–5)
RBC # BLD: 4.62 E12/L (ref 3.5–5.5)
SODIUM BLD-SCNC: 128 MMOL/L (ref 132–146)
WBC # BLD: 6 E9/L (ref 4.5–11.5)

## 2020-12-06 PROCEDURE — 96376 TX/PRO/DX INJ SAME DRUG ADON: CPT | Performed by: EMERGENCY MEDICINE

## 2020-12-06 PROCEDURE — 96374 THER/PROPH/DIAG INJ IV PUSH: CPT | Performed by: EMERGENCY MEDICINE

## 2020-12-06 PROCEDURE — 99284 EMERGENCY DEPT VISIT MOD MDM: CPT | Performed by: EMERGENCY MEDICINE

## 2020-12-06 PROCEDURE — 96361 HYDRATE IV INFUSION ADD-ON: CPT | Performed by: EMERGENCY MEDICINE

## 2020-12-06 PROCEDURE — 73502 X-RAY EXAM HIP UNI 2-3 VIEWS: CPT

## 2020-12-06 PROCEDURE — 6360000002 HC RX W HCPCS: Performed by: EMERGENCY MEDICINE

## 2020-12-06 PROCEDURE — 6370000000 HC RX 637 (ALT 250 FOR IP): Performed by: EMERGENCY MEDICINE

## 2020-12-06 PROCEDURE — 82962 GLUCOSE BLOOD TEST: CPT

## 2020-12-06 PROCEDURE — 80048 BASIC METABOLIC PNL TOTAL CA: CPT

## 2020-12-06 PROCEDURE — 2580000003 HC RX 258: Performed by: EMERGENCY MEDICINE

## 2020-12-06 PROCEDURE — 85025 COMPLETE CBC W/AUTO DIFF WBC: CPT

## 2020-12-06 PROCEDURE — 96375 TX/PRO/DX INJ NEW DRUG ADDON: CPT | Performed by: EMERGENCY MEDICINE

## 2020-12-06 RX ORDER — FENTANYL CITRATE 50 UG/ML
25 INJECTION, SOLUTION INTRAMUSCULAR; INTRAVENOUS ONCE
Status: COMPLETED | OUTPATIENT
Start: 2020-12-06 | End: 2020-12-06

## 2020-12-06 RX ORDER — ACETAMINOPHEN 325 MG/1
650 TABLET ORAL ONCE
Status: COMPLETED | OUTPATIENT
Start: 2020-12-06 | End: 2020-12-06

## 2020-12-06 RX ORDER — 0.9 % SODIUM CHLORIDE 0.9 %
1000 INTRAVENOUS SOLUTION INTRAVENOUS ONCE
Status: COMPLETED | OUTPATIENT
Start: 2020-12-06 | End: 2020-12-06

## 2020-12-06 RX ORDER — SODIUM CHLORIDE, SODIUM LACTATE, POTASSIUM CHLORIDE, CALCIUM CHLORIDE 600; 310; 30; 20 MG/100ML; MG/100ML; MG/100ML; MG/100ML
1000 INJECTION, SOLUTION INTRAVENOUS ONCE
Status: COMPLETED | OUTPATIENT
Start: 2020-12-06 | End: 2020-12-07

## 2020-12-06 RX ADMIN — INSULIN HUMAN 4 UNITS: 100 INJECTION, SOLUTION PARENTERAL at 22:32

## 2020-12-06 RX ADMIN — SODIUM CHLORIDE, POTASSIUM CHLORIDE, SODIUM LACTATE AND CALCIUM CHLORIDE 1000 ML: 600; 310; 30; 20 INJECTION, SOLUTION INTRAVENOUS at 22:31

## 2020-12-06 RX ADMIN — SODIUM CHLORIDE 1000 ML: 9 INJECTION, SOLUTION INTRAVENOUS at 20:32

## 2020-12-06 RX ADMIN — ACETAMINOPHEN 650 MG: 325 TABLET ORAL at 23:31

## 2020-12-06 RX ADMIN — FENTANYL CITRATE 25 MCG: 50 INJECTION, SOLUTION INTRAMUSCULAR; INTRAVENOUS at 21:41

## 2020-12-06 RX ADMIN — INSULIN HUMAN 6 UNITS: 100 INJECTION, SOLUTION PARENTERAL at 21:41

## 2020-12-06 ASSESSMENT — ENCOUNTER SYMPTOMS
ABDOMINAL PAIN: 0
SHORTNESS OF BREATH: 0
VOMITING: 0
EYE PAIN: 0
BACK PAIN: 0
NAUSEA: 0
SORE THROAT: 0

## 2020-12-06 ASSESSMENT — PAIN SCALES - GENERAL
PAINLEVEL_OUTOF10: 8
PAINLEVEL_OUTOF10: 8

## 2020-12-06 NOTE — ED PROVIDER NOTES
Heart sounds: Normal heart sounds. Pulmonary:      Effort: Pulmonary effort is normal. No respiratory distress. Breath sounds: Normal breath sounds. Abdominal:      Palpations: Abdomen is soft. Tenderness: There is no abdominal tenderness. Musculoskeletal:         General: No swelling or tenderness. Skin:     General: Skin is warm and dry. Findings: No rash. Neurological:      Mental Status: She is alert and oriented to person, place, and time. Cranial Nerves: No cranial nerve deficit. Procedures           MDM  Number of Diagnoses or Management Options  Dehydration:   Hypoglycemia:   Type 1 diabetes mellitus with other specified complication [facility]):   Diagnosis management comments: Patient observed in the emergency department was given oral food in order to improve her glucose status. Found to be hypoglycemic after eating drinking several juices and was given some insulin which improved her hyperglycemia. Patient was then monitored in the emergency department and maintained her normal glycemic level. She was ambulated and ambulated well. Patient was told to continue to check her blood glucose and to take fall precautions. Amount and/or Complexity of Data Reviewed  Clinical lab tests: reviewed  Tests in the radiology section of CPT®: reviewed  Decide to obtain previous medical records or to obtain history from someone other than the patient: yes              --------------------------------------------- PAST HISTORY ---------------------------------------------  Past Medical History:  has a past medical history of Bleeding at insertion site, Common femoral artery injury, right, initial encounter, Depressive disorder, and DM type 1 (diabetes mellitus, type 1) [facility] Past Surgical History:  has a past surgical history that includes Abdomen surgery (N/A, 5/9/2019) and Bartholin gland cyst excision. Social History:  reports that she has never smoked.  She has never 99 mg/dL   POCT Glucose   Result Value Ref Range    Glucose 360 mg/dL    QC OK? yes    POCT Glucose   Result Value Ref Range    Meter Glucose >500 (H) 74 - 99 mg/dL   POCT Glucose   Result Value Ref Range    Meter Glucose 360 (H) 74 - 99 mg/dL       Radiology:  XR HIP 2-3 VW W PELVIS RIGHT   Final Result   No acute abnormality of the hip.          ------------------------- NURSING NOTES AND VITALS REVIEWED ---------------------------  Date / Time Roomed:  12/6/2020  6:16 PM  ED Bed Assignment:  20/20    The nursing notes within the ED encounter and vital signs as below have been reviewed. BP (!) 140/86   Pulse 108   Temp 98.2 °F (36.8 °C) (Oral)   Resp 14   Ht 5' 1\" (1.549 m)   Wt 89 lb (40.4 kg)   SpO2 99%   BMI 16.82 kg/m²   Oxygen Saturation Interpretation: Normal      ------------------------------------------ PROGRESS NOTES ------------------------------------------  12:23 AM EST  I have spoken with the Patient and/or Family and discussed todays results, in addition to providing specific details for the plan of care and counseling regarding the diagnosis and prognosis. Labs and Imaging discussed with patient including appropriate follow- up and re-evaluation. Their questions are answered at this time and they are agreeable with the plan. I discussed at length with them reasons for immediate return here for re evaluation. They will followup with PCP by calling their office Next Business Day      --------------------------------- ADDITIONAL PROVIDER NOTES ---------------------------------  At this time the patient is without objective evidence of an acute process requiring hospitalization or inpatient management. They have remained hemodynamically stable throughout their entire ED visit and are stable for discharge with outpatient follow-up. The plan has been discussed in detail and they are aware of the specific conditions for emergent return, as well as the importance of follow-up.       New Prescriptions    No medications on file       Diagnosis:  1. Dehydration    2. Type 1 diabetes mellitus with other specified complication ([facility] [address]    3. Hypoglycemia        Disposition:  Patient's disposition: Discharge to home  Patient's condition is stable.   Erlinda Hayes DO  Resident  12/07/20 1007

## 2020-12-06 NOTE — ED NOTES
Bed: 20  Expected date:   Expected time:   Means of arrival:   Comments:  ems     Gen Bean RN  12/06/20 7328

## 2020-12-07 VITALS
TEMPERATURE: 98.2 F | DIASTOLIC BLOOD PRESSURE: 86 MMHG | WEIGHT: 89 LBS | OXYGEN SATURATION: 99 % | BODY MASS INDEX: 16.8 KG/M2 | RESPIRATION RATE: 14 BRPM | HEIGHT: 61 IN | HEART RATE: 108 BPM | SYSTOLIC BLOOD PRESSURE: 140 MMHG

## 2020-12-07 ASSESSMENT — PAIN DESCRIPTION - PAIN TYPE: TYPE: ACUTE PAIN

## 2020-12-07 ASSESSMENT — PAIN SCALES - GENERAL: PAINLEVEL_OUTOF10: 4

## 2020-12-07 NOTE — ED NOTES
Pt requesting something for pain at this time, Dr. Floyd Ventura notified.      Jeffery Russell RN  12/06/20 9605

## 2020-12-14 ENCOUNTER — APPOINTMENT (OUTPATIENT)
Dept: CT IMAGING | Age: 25
DRG: 420 | End: 2020-12-14
Payer: COMMERCIAL

## 2020-12-14 ENCOUNTER — APPOINTMENT (OUTPATIENT)
Dept: GENERAL RADIOLOGY | Age: 25
DRG: 420 | End: 2020-12-14
Payer: COMMERCIAL

## 2020-12-14 ENCOUNTER — HOSPITAL ENCOUNTER (INPATIENT)
Age: 25
LOS: 3 days | Discharge: HOME | DRG: 420 | End: 2020-12-17
Attending: EMERGENCY MEDICINE | Admitting: INTERNAL MEDICINE
Payer: COMMERCIAL

## 2020-12-14 PROBLEM — R11.2 NAUSEA, VOMITING AND DIARRHEA: Status: ACTIVE | Noted: 2020-12-14

## 2020-12-14 PROBLEM — R19.7 NAUSEA, VOMITING AND DIARRHEA: Status: ACTIVE | Noted: 2020-12-14

## 2020-12-14 LAB
ALBUMIN SERPL-MCNC: 4.2 G/DL (ref 3.5–5.2)
ALP BLD-CCNC: 105 U/L (ref 35–104)
ALT SERPL-CCNC: 33 U/L (ref 0–32)
ANION GAP SERPL CALCULATED.3IONS-SCNC: 18 MMOL/L (ref 7–16)
ANION GAP SERPL CALCULATED.3IONS-SCNC: 29 MMOL/L (ref 7–16)
AST SERPL-CCNC: 34 U/L (ref 0–31)
BACTERIA: NORMAL /HPF
BASOPHILS ABSOLUTE: 0.03 E9/L (ref 0–0.2)
BASOPHILS RELATIVE PERCENT: 0.6 % (ref 0–2)
BETA-HYDROXYBUTYRATE: >4.5 MMOL/L (ref 0.02–0.27)
BILIRUB SERPL-MCNC: 0.3 MG/DL (ref 0–1.2)
BILIRUBIN DIRECT: <0.2 MG/DL (ref 0–0.3)
BILIRUBIN URINE: NEGATIVE
BILIRUBIN, INDIRECT: ABNORMAL MG/DL (ref 0–1)
BLOOD, URINE: NEGATIVE
BUN BLDV-MCNC: 21 MG/DL (ref 6–20)
BUN BLDV-MCNC: 24 MG/DL (ref 6–20)
CALCIUM SERPL-MCNC: 9.7 MG/DL (ref 8.6–10.2)
CALCIUM SERPL-MCNC: 9.9 MG/DL (ref 8.6–10.2)
CHLORIDE BLD-SCNC: 85 MMOL/L (ref 98–107)
CHLORIDE BLD-SCNC: 99 MMOL/L (ref 98–107)
CHP ED QC CHECK: NORMAL
CHP ED QC CHECK: YES
CLARITY: ABNORMAL
CO2: 13 MMOL/L (ref 22–29)
CO2: 16 MMOL/L (ref 22–29)
COLOR: ABNORMAL
CREAT SERPL-MCNC: 0.6 MG/DL (ref 0.5–1)
CREAT SERPL-MCNC: 0.6 MG/DL (ref 0.5–1)
EOSINOPHILS ABSOLUTE: 0.01 E9/L (ref 0.05–0.5)
EOSINOPHILS RELATIVE PERCENT: 0.2 % (ref 0–6)
EPITHELIAL CELLS, UA: NORMAL /HPF
GFR AFRICAN AMERICAN: >60
GFR AFRICAN AMERICAN: >60
GFR NON-AFRICAN AMERICAN: >60 ML/MIN/1.73
GFR NON-AFRICAN AMERICAN: >60 ML/MIN/1.73
GLUCOSE BLD-MCNC: 241 MG/DL
GLUCOSE BLD-MCNC: 284 MG/DL (ref 74–99)
GLUCOSE BLD-MCNC: 351 MG/DL
GLUCOSE BLD-MCNC: 367 MG/DL
GLUCOSE BLD-MCNC: 468 MG/DL
GLUCOSE BLD-MCNC: 701 MG/DL (ref 74–99)
GLUCOSE BLD-MCNC: NORMAL MG/DL
GLUCOSE URINE: 500 MG/DL
HCG QUALITATIVE: NEGATIVE
HCT VFR BLD CALC: 37.4 % (ref 34–48)
HEMOGLOBIN: 11.6 G/DL (ref 11.5–15.5)
IMMATURE GRANULOCYTES #: 0.01 E9/L
IMMATURE GRANULOCYTES %: 0.2 % (ref 0–5)
INR BLD: 0.9
KETONES, URINE: >=80 MG/DL
LACTIC ACID: 1.4 MMOL/L (ref 0.5–2.2)
LEUKOCYTE ESTERASE, URINE: NEGATIVE
LIPASE: 16 U/L (ref 13–60)
LYMPHOCYTES ABSOLUTE: 2.37 E9/L (ref 1.5–4)
LYMPHOCYTES RELATIVE PERCENT: 43.7 % (ref 20–42)
MAGNESIUM: 2.1 MG/DL (ref 1.6–2.6)
MCH RBC QN AUTO: 24.9 PG (ref 26–35)
MCHC RBC AUTO-ENTMCNC: 31 % (ref 32–34.5)
MCV RBC AUTO: 80.4 FL (ref 80–99.9)
METER GLUCOSE: 241 MG/DL (ref 74–99)
METER GLUCOSE: 351 MG/DL (ref 74–99)
METER GLUCOSE: 367 MG/DL (ref 74–99)
METER GLUCOSE: 468 MG/DL (ref 74–99)
METER GLUCOSE: >500 MG/DL (ref 74–99)
MONOCYTES ABSOLUTE: 0.25 E9/L (ref 0.1–0.95)
MONOCYTES RELATIVE PERCENT: 4.6 % (ref 2–12)
NEUTROPHILS ABSOLUTE: 2.75 E9/L (ref 1.8–7.3)
NEUTROPHILS RELATIVE PERCENT: 50.7 % (ref 43–80)
NITRITE, URINE: NEGATIVE
PDW BLD-RTO: 14.3 FL (ref 11.5–15)
PH UA: 5.5 (ref 5–9)
PH VENOUS: 7.26 (ref 7.35–7.45)
PHOSPHORUS: 4.8 MG/DL (ref 2.5–4.5)
PLATELET # BLD: 345 E9/L (ref 130–450)
PMV BLD AUTO: 11.2 FL (ref 7–12)
POTASSIUM REFLEX MAGNESIUM: 5.3 MMOL/L (ref 3.5–5)
POTASSIUM SERPL-SCNC: 4.7 MMOL/L (ref 3.5–5)
PRO-BNP: 25 PG/ML (ref 0–125)
PROTEIN UA: NEGATIVE MG/DL
PROTHROMBIN TIME: 10.7 SEC (ref 9.3–12.4)
RBC # BLD: 4.65 E12/L (ref 3.5–5.5)
RBC UA: NORMAL /HPF (ref 0–2)
SODIUM BLD-SCNC: 127 MMOL/L (ref 132–146)
SODIUM BLD-SCNC: 133 MMOL/L (ref 132–146)
SPECIFIC GRAVITY UA: 1.01 (ref 1–1.03)
TOTAL PROTEIN: 8.2 G/DL (ref 6.4–8.3)
TROPONIN: <0.01 NG/ML (ref 0–0.03)
UROBILINOGEN, URINE: 0.2 E.U./DL
WBC # BLD: 5.4 E9/L (ref 4.5–11.5)
WBC UA: NORMAL /HPF (ref 0–5)

## 2020-12-14 PROCEDURE — 85610 PROTHROMBIN TIME: CPT

## 2020-12-14 PROCEDURE — 0202U NFCT DS 22 TRGT SARS-COV-2: CPT

## 2020-12-14 PROCEDURE — 6370000000 HC RX 637 (ALT 250 FOR IP): Performed by: EMERGENCY MEDICINE

## 2020-12-14 PROCEDURE — 96375 TX/PRO/DX INJ NEW DRUG ADDON: CPT

## 2020-12-14 PROCEDURE — 80048 BASIC METABOLIC PNL TOTAL CA: CPT

## 2020-12-14 PROCEDURE — 2580000003 HC RX 258: Performed by: EMERGENCY MEDICINE

## 2020-12-14 PROCEDURE — 84100 ASSAY OF PHOSPHORUS: CPT

## 2020-12-14 PROCEDURE — 83605 ASSAY OF LACTIC ACID: CPT

## 2020-12-14 PROCEDURE — 1200000000 HC SEMI PRIVATE

## 2020-12-14 PROCEDURE — 96361 HYDRATE IV INFUSION ADD-ON: CPT

## 2020-12-14 PROCEDURE — 6360000002 HC RX W HCPCS: Performed by: EMERGENCY MEDICINE

## 2020-12-14 PROCEDURE — 84703 CHORIONIC GONADOTROPIN ASSAY: CPT

## 2020-12-14 PROCEDURE — 81001 URINALYSIS AUTO W/SCOPE: CPT

## 2020-12-14 PROCEDURE — 74177 CT ABD & PELVIS W/CONTRAST: CPT

## 2020-12-14 PROCEDURE — 99222 1ST HOSP IP/OBS MODERATE 55: CPT | Performed by: INTERNAL MEDICINE

## 2020-12-14 PROCEDURE — 83880 ASSAY OF NATRIURETIC PEPTIDE: CPT

## 2020-12-14 PROCEDURE — 93005 ELECTROCARDIOGRAM TRACING: CPT | Performed by: EMERGENCY MEDICINE

## 2020-12-14 PROCEDURE — 96374 THER/PROPH/DIAG INJ IV PUSH: CPT

## 2020-12-14 PROCEDURE — 84484 ASSAY OF TROPONIN QUANT: CPT

## 2020-12-14 PROCEDURE — 80307 DRUG TEST PRSMV CHEM ANLYZR: CPT

## 2020-12-14 PROCEDURE — 6360000004 HC RX CONTRAST MEDICATION: Performed by: RADIOLOGY

## 2020-12-14 PROCEDURE — 82010 KETONE BODYS QUAN: CPT

## 2020-12-14 PROCEDURE — 96376 TX/PRO/DX INJ SAME DRUG ADON: CPT

## 2020-12-14 PROCEDURE — 6360000002 HC RX W HCPCS: Performed by: INTERNAL MEDICINE

## 2020-12-14 PROCEDURE — 83735 ASSAY OF MAGNESIUM: CPT

## 2020-12-14 PROCEDURE — 82800 BLOOD PH: CPT

## 2020-12-14 PROCEDURE — 80076 HEPATIC FUNCTION PANEL: CPT

## 2020-12-14 PROCEDURE — 82962 GLUCOSE BLOOD TEST: CPT

## 2020-12-14 PROCEDURE — 74018 RADEX ABDOMEN 1 VIEW: CPT

## 2020-12-14 PROCEDURE — 99285 EMERGENCY DEPT VISIT HI MDM: CPT

## 2020-12-14 PROCEDURE — 83690 ASSAY OF LIPASE: CPT

## 2020-12-14 PROCEDURE — 85025 COMPLETE CBC W/AUTO DIFF WBC: CPT

## 2020-12-14 RX ORDER — ONDANSETRON 2 MG/ML
4 INJECTION INTRAMUSCULAR; INTRAVENOUS EVERY 6 HOURS PRN
Status: DISCONTINUED | OUTPATIENT
Start: 2020-12-14 | End: 2020-12-17 | Stop reason: HOSPADM

## 2020-12-14 RX ORDER — METOCLOPRAMIDE HYDROCHLORIDE 5 MG/ML
10 INJECTION INTRAMUSCULAR; INTRAVENOUS EVERY 6 HOURS
Status: DISCONTINUED | OUTPATIENT
Start: 2020-12-14 | End: 2020-12-17 | Stop reason: HOSPADM

## 2020-12-14 RX ORDER — FENTANYL CITRATE 50 UG/ML
50 INJECTION, SOLUTION INTRAMUSCULAR; INTRAVENOUS
Status: DISCONTINUED | OUTPATIENT
Start: 2020-12-14 | End: 2020-12-16

## 2020-12-14 RX ORDER — SODIUM CHLORIDE 450 MG/100ML
INJECTION, SOLUTION INTRAVENOUS CONTINUOUS
Status: DISCONTINUED | OUTPATIENT
Start: 2020-12-14 | End: 2020-12-15

## 2020-12-14 RX ORDER — FENTANYL CITRATE 50 UG/ML
25 INJECTION, SOLUTION INTRAMUSCULAR; INTRAVENOUS ONCE
Status: COMPLETED | OUTPATIENT
Start: 2020-12-14 | End: 2020-12-14

## 2020-12-14 RX ORDER — 0.9 % SODIUM CHLORIDE 0.9 %
1000 INTRAVENOUS SOLUTION INTRAVENOUS ONCE
Status: COMPLETED | OUTPATIENT
Start: 2020-12-14 | End: 2020-12-14

## 2020-12-14 RX ORDER — DEXTROSE AND SODIUM CHLORIDE 5; .45 G/100ML; G/100ML
INJECTION, SOLUTION INTRAVENOUS CONTINUOUS PRN
Status: DISCONTINUED | OUTPATIENT
Start: 2020-12-14 | End: 2020-12-16

## 2020-12-14 RX ORDER — ONDANSETRON 2 MG/ML
4 INJECTION INTRAMUSCULAR; INTRAVENOUS ONCE
Status: COMPLETED | OUTPATIENT
Start: 2020-12-14 | End: 2020-12-14

## 2020-12-14 RX ORDER — DEXTROSE MONOHYDRATE 25 G/50ML
12.5 INJECTION, SOLUTION INTRAVENOUS PRN
Status: DISCONTINUED | OUTPATIENT
Start: 2020-12-14 | End: 2020-12-17 | Stop reason: HOSPADM

## 2020-12-14 RX ORDER — MAGNESIUM SULFATE 1 G/100ML
1 INJECTION INTRAVENOUS PRN
Status: DISCONTINUED | OUTPATIENT
Start: 2020-12-14 | End: 2020-12-17 | Stop reason: HOSPADM

## 2020-12-14 RX ORDER — POTASSIUM CHLORIDE 7.45 MG/ML
10 INJECTION INTRAVENOUS PRN
Status: DISCONTINUED | OUTPATIENT
Start: 2020-12-14 | End: 2020-12-17 | Stop reason: HOSPADM

## 2020-12-14 RX ADMIN — FENTANYL CITRATE 25 MCG: 50 INJECTION, SOLUTION INTRAMUSCULAR; INTRAVENOUS at 18:18

## 2020-12-14 RX ADMIN — IOPAMIDOL 75 ML: 755 INJECTION, SOLUTION INTRAVENOUS at 17:37

## 2020-12-14 RX ADMIN — FENTANYL CITRATE 25 MCG: 50 INJECTION, SOLUTION INTRAMUSCULAR; INTRAVENOUS at 16:11

## 2020-12-14 RX ADMIN — METOCLOPRAMIDE 10 MG: 5 INJECTION, SOLUTION INTRAMUSCULAR; INTRAVENOUS at 20:56

## 2020-12-14 RX ADMIN — ONDANSETRON 4 MG: 2 INJECTION INTRAMUSCULAR; INTRAVENOUS at 18:18

## 2020-12-14 RX ADMIN — INSULIN HUMAN 4 UNITS: 100 INJECTION, SOLUTION PARENTERAL at 17:58

## 2020-12-14 RX ADMIN — SODIUM CHLORIDE 0.1 UNITS/KG/HR: 9 INJECTION, SOLUTION INTRAVENOUS at 17:59

## 2020-12-14 RX ADMIN — FENTANYL CITRATE 50 MCG: 50 INJECTION, SOLUTION INTRAMUSCULAR; INTRAVENOUS at 20:49

## 2020-12-14 RX ADMIN — DEXTROSE AND SODIUM CHLORIDE: 5; 450 INJECTION, SOLUTION INTRAVENOUS at 23:10

## 2020-12-14 RX ADMIN — SODIUM CHLORIDE 1000 ML: 9 INJECTION, SOLUTION INTRAVENOUS at 16:11

## 2020-12-14 ASSESSMENT — ENCOUNTER SYMPTOMS
DIARRHEA: 0
CHOKING: 0
ABDOMINAL PAIN: 0
FACIAL SWELLING: 0
SHORTNESS OF BREATH: 0
COUGH: 0
PHOTOPHOBIA: 0
BACK PAIN: 0
COLOR CHANGE: 0
NAUSEA: 0
EYE REDNESS: 0

## 2020-12-14 ASSESSMENT — PAIN SCALES - GENERAL
PAINLEVEL_OUTOF10: 10
PAINLEVEL_OUTOF10: 0
PAINLEVEL_OUTOF10: 10
PAINLEVEL_OUTOF10: 9
PAINLEVEL_OUTOF10: 3
PAINLEVEL_OUTOF10: 10
PAINLEVEL_OUTOF10: 0

## 2020-12-14 NOTE — ED PROVIDER NOTES
Padilla Velázquez is a 23 y.o. female presenting to the ED for fatigue, abdominal pain, nausea, high blood sufgar, beginning 1 day ago. The complaint has been persistent, moderate in severity, and worsened by nothing. 24 yo f who notes generally she hasnt felt well the past few days. She noted she has had abdominal pain, nausea vomiting. She notes her blood sugar was very low yesterday morning, today it has been > 500. She denies fever or chills, cough or cold sx, uti sx. She notes she has lost a lot of weight in the past few months. She is on basaglar and humalog and states she hasnt missed any doses. She hasnt had a period in months she states and is not sexually active and is not on ocps. She was seen on 12/6 in the ED and not in dka at that time but very hyperglycemic. Pt alledgredly had covid in October and no covid sx at this time or exposures. She f/w dr Toño Parish endocrine and dr Ever Kyle [facility] and pcp is dr Danika Tovar    Review of Systems:   Review of Systems   Constitutional: Negative for appetite change, chills and diaphoresis. HENT: Negative for congestion, ear pain and facial swelling. Eyes: Negative for photophobia, redness and visual disturbance. Respiratory: Negative for cough, choking and shortness of breath. Cardiovascular: Negative for chest pain, palpitations and leg swelling. Gastrointestinal: Negative for abdominal pain, diarrhea and nausea. Endocrine: Negative for polydipsia and polyuria. Genitourinary: Negative for dysuria, flank pain and frequency. Musculoskeletal: Negative for back pain and neck pain. Skin: Negative for color change and pallor. Allergic/Immunologic: Negative for environmental allergies, food allergies and immunocompromised state. Neurological: Negative for dizziness, facial asymmetry, light-headedness and headaches. Hematological: Negative for adenopathy. Does not bruise/bleed easily.    Psychiatric/Behavioral: Negative for agitation and Absolute 0.25 0.10 - 0.95 E9/L    Eosinophils Absolute 0.01 (L) 0.05 - 0.50 E9/L    Basophils Absolute 0.03 0.00 - 0.20 S4/C   Basic Metabolic Panel w/ Reflex to MG   Result Value Ref Range    Sodium 127 (L) 132 - 146 mmol/L    Potassium reflex Magnesium 5.3 (H) 3.5 - 5.0 mmol/L    Chloride 85 (L) 98 - 107 mmol/L    CO2 13 (L) 22 - 29 mmol/L    Anion Gap 29 (H) 7 - 16 mmol/L    Glucose 701 (HH) 74 - 99 mg/dL    BUN 24 (H) 6 - 20 mg/dL    CREATININE 0.6 0.5 - 1.0 mg/dL    GFR Non-African American >60 >=60 mL/min/1.73    GFR African American >60     Calcium 9.9 8.6 - 10.2 mg/dL   Hepatic Function Panel   Result Value Ref Range    Total Protein 8.2 6.4 - 8.3 g/dL    Alb 4.2 3.5 - 5.2 g/dL    Alkaline Phosphatase 105 (H) 35 - 104 U/L    ALT 33 (H) 0 - 32 U/L    AST 34 (H) 0 - 31 U/L    Total Bilirubin 0.3 0.0 - 1.2 mg/dL    Bilirubin, Direct <0.2 0.0 - 0.3 mg/dL    Bilirubin, Indirect see below 0.0 - 1.0 mg/dL   Lipase   Result Value Ref Range    Lipase 16 13 - 60 U/L   Troponin   Result Value Ref Range    Troponin <0.01 0.00 - 0.03 ng/mL   Brain Natriuretic Peptide   Result Value Ref Range    Pro-BNP 25 0 - 125 pg/mL   Protime-INR   Result Value Ref Range    Protime 10.7 9.3 - 12.4 sec    INR 0.9    HCG Qualitative, Serum   Result Value Ref Range    hCG Qual NEGATIVE NEGATIVE   PH, VENOUS   Result Value Ref Range    pH, Christoph 7.26 (L) 7.35 - 7.45   Beta-Hydroxybutyrate   Result Value Ref Range    Beta-Hydroxybutyrate >4.50 (H) 0.02 - 0.27 mmol/L   POCT Glucose   Result Value Ref Range    Glucose  mg/dL    QC OK? ok    POCT Glucose   Result Value Ref Range    Meter Glucose >500 (H) 74 - 99 mg/dL   EKG 12 Lead   Result Value Ref Range    Ventricular Rate 119 BPM    Atrial Rate 119 BPM    P-R Interval 138 ms    QRS Duration 64 ms    Q-T Interval 328 ms    QTc Calculation (Bazett) 461 ms    P Axis 73 degrees    R Axis 63 degrees    T Axis 46 degrees       RADIOLOGY:  Interpreted by Radiologist.  Shannon SEYMOUR IV CONTRAST Additional Contrast? None   Final Result   Distended and fluid-filled stomach and proximal duodenum, with decompressed   small bowel distal to the transverse segment of the duodenum. In addition   the mesenteries angle appears to be reduced. The appearance raises concern   for SMA syndrome. Critical results were called by Dr. Cornelio Porter to Soy Watkins on   12/14/2020 at 18:19. XR ABDOMEN FOR NG/OG/NE TUBE PLACEMENT    (Results Pending)       ------------------------- NURSING NOTES AND VITALS REVIEWED ---------------------------   The nursing notes within the ED encounter and vital signs as below have been reviewed. /63   Pulse 121   Resp 18   Ht 5' 1\" (1.549 m)   Wt 95 lb (43.1 kg)   SpO2 98%   BMI 17.95 kg/m²   Oxygen Saturation Interpretation: Normal      ---------------------------------------------------PHYSICAL EXAM--------------------------------------    Physical Exam  Vitals signs reviewed. Constitutional:       General: She is not in acute distress. Appearance: Normal appearance. She is not toxic-appearing. HENT:      Head: Normocephalic and atraumatic. Right Ear: External ear normal.      Left Ear: External ear normal.      Nose: Nose normal. No congestion. Mouth/Throat:      Mouth: Mucous membranes are moist.      Pharynx: Oropharynx is clear. No posterior oropharyngeal erythema. Eyes:      Extraocular Movements: Extraocular movements intact. Pupils: Pupils are equal, round, and reactive to light. Neck:      Musculoskeletal: Normal range of motion and neck supple. No muscular tenderness. Cardiovascular:      Rate and Rhythm: Normal rate and regular rhythm. Pulses: Normal pulses. Heart sounds: No murmur. Pulmonary:      Effort: Pulmonary effort is normal.      Breath sounds: No wheezing or rhonchi. Chest:      Chest wall: No tenderness. Abdominal:      General: Bowel sounds are normal.      Palpations: There is no mass. Tenderness: There is abdominal tenderness. There is no right CVA tenderness, left CVA tenderness, guarding or rebound. Hernia: No hernia is present. Musculoskeletal:         General: No swelling or deformity. Skin:     General: Skin is warm and dry. Capillary Refill: Capillary refill takes less than 2 seconds. Neurological:      General: No focal deficit present. Mental Status: She is alert and oriented to person, place, and time.    Psychiatric:         Mood and Affect: Mood normal.               ------------------------------ ED COURSE/MEDICAL DECISION MAKING----------------------  Medications   dextrose 50 % IV solution (has no administration in time range)   potassium chloride 10 mEq/100 mL IVPB (Peripheral Line) (has no administration in time range)   magnesium sulfate 1 g in dextrose 5% 100 mL IVPB (has no administration in time range)   sodium phosphate 10 mmol in dextrose 5 % 250 mL IVPB (has no administration in time range)     Or   sodium phosphate 15 mmol in dextrose 5 % 250 mL IVPB (has no administration in time range)     Or   sodium phosphate 20 mmol in dextrose 5 % 500 mL IVPB (has no administration in time range)   0.45 % sodium chloride infusion (has no administration in time range)   dextrose 5 % and 0.45 % sodium chloride infusion (has no administration in time range)   insulin regular (HUMULIN R;NOVOLIN R) 100 Units in sodium chloride 0.9 % 100 mL infusion (0.1 Units/kg/hr × 43.1 kg Intravenous New Bag 12/14/20 1759)   0.9 % sodium chloride bolus (1,000 mLs Intravenous New Bag 12/14/20 1611)   fentaNYL (SUBLIMAZE) injection 25 mcg (25 mcg Intravenous Given 12/14/20 1611)   iopamidol (ISOVUE-370) 76 % injection 75 mL (75 mLs Intravenous Given 12/14/20 1737)   insulin regular (HUMULIN R;NOVOLIN R) injection 4 Units (4 Units Intravenous Given 12/14/20 1758)   fentaNYL (SUBLIMAZE) injection 25 mcg (25 mcg Intravenous Given 12/14/20 1818)   ondansetron (ZOFRAN) injection 4 mg (4 mg Intravenous Given 12/14/20 1818)     EKG: This EKG is signed and interpreted by me. Time:1606  Rate: 119  Rhythm: Sinus  Interpretation: non-specific EKG  Comparison: stable as compared to patient's most recent EKG      ED COURSE:  ED Course as of Dec 14 1911   Mon Dec 14, 2020   1902 D/w dr Kyle Hopkins will admit to icu as hospitalist    [SHERIF]   1909 D/w   critical care dr Laron Barksdale agrees with plan, if gap closes downgrade    [SHERIF]   1911 D/w dr Yen Benavidez will see in am, NG tube if pt can tolerate    [SHERIF]      ED Course User Index  [SHERIF] Teodora Krause, DO       Medical Decision Making:    Pt presented in DKA having abd pain, had normal gastric emptying scan in September, CT showed possible sma syndrome, d/w general surgery who will see jamilw, pt on insulin drip, given fluid bolous. Will place ng tube if patient allows    Critical care:  Please note that the withdrawal or failure to initiate urgent interventions for this patient would likely result in a life threatening deterioration or permanent disability. Accordingly this patient received 60 minutes of critical care time, excluding separately billable procedures. Counseling: The emergency provider has spoken with the patient and discussed todays results, in addition to providing specific details for the plan of care and counseling regarding the diagnosis and prognosis. Questions are answered at this time and they are agreeable with the plan.      --------------------------------- IMPRESSION AND DISPOSITION ---------------------------------    IMPRESSION  1. Type 1 diabetes mellitus with ketoacidosis without coma ([facility] [address]    2. Generalized abdominal pain    3. Dehydration        DISPOSITION  Disposition: Admit to CCU/ICU  Patient condition is fair      NOTE: This report was transcribed using voice recognition software.  Every effort was made to ensure accuracy; however, inadvertent computerized transcription errors may be present       Sarahi Louis

## 2020-12-15 ENCOUNTER — APPOINTMENT (OUTPATIENT)
Dept: GENERAL RADIOLOGY | Age: 25
DRG: 420 | End: 2020-12-15
Payer: COMMERCIAL

## 2020-12-15 LAB
ADENOVIRUS BY PCR: NOT DETECTED
AMPHETAMINE SCREEN, URINE: NOT DETECTED
ANION GAP SERPL CALCULATED.3IONS-SCNC: 11 MMOL/L (ref 7–16)
ANION GAP SERPL CALCULATED.3IONS-SCNC: 13 MMOL/L (ref 7–16)
ANION GAP SERPL CALCULATED.3IONS-SCNC: 13 MMOL/L (ref 7–16)
ANION GAP SERPL CALCULATED.3IONS-SCNC: 20 MMOL/L (ref 7–16)
ANION GAP SERPL CALCULATED.3IONS-SCNC: 8 MMOL/L (ref 7–16)
ANION GAP SERPL CALCULATED.3IONS-SCNC: 9 MMOL/L (ref 7–16)
BARBITURATE SCREEN URINE: NOT DETECTED
BENZODIAZEPINE SCREEN, URINE: NOT DETECTED
BORDETELLA PARAPERTUSSIS BY PCR: NOT DETECTED
BORDETELLA PERTUSSIS BY PCR: NOT DETECTED
BUN BLDV-MCNC: 10 MG/DL (ref 6–20)
BUN BLDV-MCNC: 12 MG/DL (ref 6–20)
BUN BLDV-MCNC: 14 MG/DL (ref 6–20)
BUN BLDV-MCNC: 16 MG/DL (ref 6–20)
BUN BLDV-MCNC: 17 MG/DL (ref 6–20)
BUN BLDV-MCNC: 9 MG/DL (ref 6–20)
CALCIUM SERPL-MCNC: 8.9 MG/DL (ref 8.6–10.2)
CALCIUM SERPL-MCNC: 9.4 MG/DL (ref 8.6–10.2)
CALCIUM SERPL-MCNC: 9.4 MG/DL (ref 8.6–10.2)
CALCIUM SERPL-MCNC: 9.6 MG/DL (ref 8.6–10.2)
CANNABINOID SCREEN URINE: NOT DETECTED
CHLAMYDOPHILIA PNEUMONIAE BY PCR: NOT DETECTED
CHLORIDE BLD-SCNC: 101 MMOL/L (ref 98–107)
CHLORIDE BLD-SCNC: 101 MMOL/L (ref 98–107)
CHLORIDE BLD-SCNC: 102 MMOL/L (ref 98–107)
CHLORIDE BLD-SCNC: 105 MMOL/L (ref 98–107)
CHLORIDE BLD-SCNC: 106 MMOL/L (ref 98–107)
CHLORIDE BLD-SCNC: 98 MMOL/L (ref 98–107)
CHP ED QC CHECK: NORMAL
CHP ED QC CHECK: YES
CO2: 13 MMOL/L (ref 22–29)
CO2: 16 MMOL/L (ref 22–29)
CO2: 20 MMOL/L (ref 22–29)
CO2: 21 MMOL/L (ref 22–29)
COCAINE METABOLITE SCREEN URINE: NOT DETECTED
CORONAVIRUS 229E BY PCR: NOT DETECTED
CORONAVIRUS HKU1 BY PCR: NOT DETECTED
CORONAVIRUS NL63 BY PCR: NOT DETECTED
CORONAVIRUS OC43 BY PCR: NOT DETECTED
CREAT SERPL-MCNC: 0.5 MG/DL (ref 0.5–1)
CREAT SERPL-MCNC: 0.6 MG/DL (ref 0.5–1)
EKG ATRIAL RATE: 119 BPM
EKG P AXIS: 73 DEGREES
EKG P-R INTERVAL: 138 MS
EKG Q-T INTERVAL: 328 MS
EKG QRS DURATION: 64 MS
EKG QTC CALCULATION (BAZETT): 461 MS
EKG R AXIS: 63 DEGREES
EKG T AXIS: 46 DEGREES
EKG VENTRICULAR RATE: 119 BPM
FENTANYL SCREEN, URINE: POSITIVE
GFR AFRICAN AMERICAN: >60
GFR NON-AFRICAN AMERICAN: >60 ML/MIN/1.73
GLUCOSE BLD-MCNC: 108 MG/DL (ref 74–99)
GLUCOSE BLD-MCNC: 111 MG/DL
GLUCOSE BLD-MCNC: 117 MG/DL
GLUCOSE BLD-MCNC: 118 MG/DL
GLUCOSE BLD-MCNC: 120 MG/DL
GLUCOSE BLD-MCNC: 127 MG/DL
GLUCOSE BLD-MCNC: 132 MG/DL (ref 74–99)
GLUCOSE BLD-MCNC: 135 MG/DL (ref 74–99)
GLUCOSE BLD-MCNC: 165 MG/DL
GLUCOSE BLD-MCNC: 169 MG/DL (ref 74–99)
GLUCOSE BLD-MCNC: 175 MG/DL
GLUCOSE BLD-MCNC: 181 MG/DL
GLUCOSE BLD-MCNC: 196 MG/DL
GLUCOSE BLD-MCNC: 200 MG/DL
GLUCOSE BLD-MCNC: 246 MG/DL
GLUCOSE BLD-MCNC: 254 MG/DL (ref 74–99)
GLUCOSE BLD-MCNC: 469 MG/DL (ref 74–99)
HUMAN METAPNEUMOVIRUS BY PCR: NOT DETECTED
HUMAN RHINOVIRUS/ENTEROVIRUS BY PCR: NOT DETECTED
INFLUENZA A BY PCR: NOT DETECTED
INFLUENZA B BY PCR: NOT DETECTED
Lab: ABNORMAL
MAGNESIUM: 1.5 MG/DL (ref 1.6–2.6)
MAGNESIUM: 1.5 MG/DL (ref 1.6–2.6)
MAGNESIUM: 1.6 MG/DL (ref 1.6–2.6)
MAGNESIUM: 1.9 MG/DL (ref 1.6–2.6)
MAGNESIUM: 2 MG/DL (ref 1.6–2.6)
MAGNESIUM: 2.3 MG/DL (ref 1.6–2.6)
METER GLUCOSE: 111 MG/DL (ref 74–99)
METER GLUCOSE: 117 MG/DL (ref 74–99)
METER GLUCOSE: 118 MG/DL (ref 74–99)
METER GLUCOSE: 120 MG/DL (ref 74–99)
METER GLUCOSE: 127 MG/DL (ref 74–99)
METER GLUCOSE: 132 MG/DL (ref 74–99)
METER GLUCOSE: 165 MG/DL (ref 74–99)
METER GLUCOSE: 175 MG/DL (ref 74–99)
METER GLUCOSE: 181 MG/DL (ref 74–99)
METER GLUCOSE: 196 MG/DL (ref 74–99)
METER GLUCOSE: 200 MG/DL (ref 74–99)
METER GLUCOSE: 246 MG/DL (ref 74–99)
METER GLUCOSE: 312 MG/DL (ref 74–99)
METER GLUCOSE: 350 MG/DL (ref 74–99)
METER GLUCOSE: 422 MG/DL (ref 74–99)
METER GLUCOSE: 83 MG/DL (ref 74–99)
METHADONE SCREEN, URINE: NOT DETECTED
MYCOPLASMA PNEUMONIAE BY PCR: NOT DETECTED
OPIATE SCREEN URINE: NOT DETECTED
OXYCODONE URINE: NOT DETECTED
PARAINFLUENZA VIRUS 1 BY PCR: NOT DETECTED
PARAINFLUENZA VIRUS 2 BY PCR: NOT DETECTED
PARAINFLUENZA VIRUS 3 BY PCR: NOT DETECTED
PARAINFLUENZA VIRUS 4 BY PCR: NOT DETECTED
PHENCYCLIDINE SCREEN URINE: NOT DETECTED
PHOSPHORUS: 2.6 MG/DL (ref 2.5–4.5)
PHOSPHORUS: 2.6 MG/DL (ref 2.5–4.5)
PHOSPHORUS: 3.7 MG/DL (ref 2.5–4.5)
PHOSPHORUS: 3.9 MG/DL (ref 2.5–4.5)
PHOSPHORUS: 4.3 MG/DL (ref 2.5–4.5)
PHOSPHORUS: 4.5 MG/DL (ref 2.5–4.5)
POTASSIUM SERPL-SCNC: 3.2 MMOL/L (ref 3.5–5)
POTASSIUM SERPL-SCNC: 3.5 MMOL/L (ref 3.5–5)
POTASSIUM SERPL-SCNC: 4.1 MMOL/L (ref 3.5–5)
POTASSIUM SERPL-SCNC: 4.4 MMOL/L (ref 3.5–5)
POTASSIUM SERPL-SCNC: 4.5 MMOL/L (ref 3.5–5)
POTASSIUM SERPL-SCNC: 4.5 MMOL/L (ref 3.5–5)
RESPIRATORY SYNCYTIAL VIRUS BY PCR: NOT DETECTED
SARS-COV-2, PCR: NOT DETECTED
SODIUM BLD-SCNC: 131 MMOL/L (ref 132–146)
SODIUM BLD-SCNC: 131 MMOL/L (ref 132–146)
SODIUM BLD-SCNC: 132 MMOL/L (ref 132–146)
SODIUM BLD-SCNC: 134 MMOL/L (ref 132–146)
SODIUM BLD-SCNC: 134 MMOL/L (ref 132–146)
SODIUM BLD-SCNC: 135 MMOL/L (ref 132–146)

## 2020-12-15 PROCEDURE — 36415 COLL VENOUS BLD VENIPUNCTURE: CPT

## 2020-12-15 PROCEDURE — 99254 IP/OBS CNSLTJ NEW/EST MOD 60: CPT | Performed by: INTERNAL MEDICINE

## 2020-12-15 PROCEDURE — 83735 ASSAY OF MAGNESIUM: CPT

## 2020-12-15 PROCEDURE — 82962 GLUCOSE BLOOD TEST: CPT

## 2020-12-15 PROCEDURE — 2580000003 HC RX 258: Performed by: INTERNAL MEDICINE

## 2020-12-15 PROCEDURE — 6360000002 HC RX W HCPCS: Performed by: INTERNAL MEDICINE

## 2020-12-15 PROCEDURE — 1200000000 HC SEMI PRIVATE

## 2020-12-15 PROCEDURE — 6360000002 HC RX W HCPCS: Performed by: EMERGENCY MEDICINE

## 2020-12-15 PROCEDURE — 74018 RADEX ABDOMEN 1 VIEW: CPT

## 2020-12-15 PROCEDURE — 84100 ASSAY OF PHOSPHORUS: CPT

## 2020-12-15 PROCEDURE — 93010 ELECTROCARDIOGRAM REPORT: CPT | Performed by: INTERNAL MEDICINE

## 2020-12-15 PROCEDURE — 2580000003 HC RX 258

## 2020-12-15 PROCEDURE — 99291 CRITICAL CARE FIRST HOUR: CPT | Performed by: INTERNAL MEDICINE

## 2020-12-15 PROCEDURE — 80048 BASIC METABOLIC PNL TOTAL CA: CPT

## 2020-12-15 PROCEDURE — 6370000000 HC RX 637 (ALT 250 FOR IP): Performed by: INTERNAL MEDICINE

## 2020-12-15 RX ORDER — 0.9 % SODIUM CHLORIDE 0.9 %
1000 INTRAVENOUS SOLUTION INTRAVENOUS ONCE
Status: COMPLETED | OUTPATIENT
Start: 2020-12-15 | End: 2020-12-15

## 2020-12-15 RX ORDER — DEXTROSE MONOHYDRATE 25 G/50ML
12.5 INJECTION, SOLUTION INTRAVENOUS PRN
Status: DISCONTINUED | OUTPATIENT
Start: 2020-12-15 | End: 2020-12-17 | Stop reason: HOSPADM

## 2020-12-15 RX ORDER — INSULIN GLARGINE 100 [IU]/ML
14 INJECTION, SOLUTION SUBCUTANEOUS EVERY MORNING
Status: DISCONTINUED | OUTPATIENT
Start: 2020-12-16 | End: 2020-12-17 | Stop reason: HOSPADM

## 2020-12-15 RX ORDER — DEXTROSE MONOHYDRATE 50 MG/ML
100 INJECTION, SOLUTION INTRAVENOUS PRN
Status: DISCONTINUED | OUTPATIENT
Start: 2020-12-15 | End: 2020-12-17 | Stop reason: HOSPADM

## 2020-12-15 RX ORDER — INSULIN GLARGINE 100 [IU]/ML
25 INJECTION, SOLUTION SUBCUTANEOUS DAILY
Status: DISCONTINUED | OUTPATIENT
Start: 2020-12-15 | End: 2020-12-15

## 2020-12-15 RX ORDER — SODIUM CHLORIDE 0.9 % (FLUSH) 0.9 %
SYRINGE (ML) INJECTION
Status: COMPLETED
Start: 2020-12-15 | End: 2020-12-15

## 2020-12-15 RX ORDER — NICOTINE POLACRILEX 4 MG
15 LOZENGE BUCCAL PRN
Status: DISCONTINUED | OUTPATIENT
Start: 2020-12-15 | End: 2020-12-17 | Stop reason: HOSPADM

## 2020-12-15 RX ORDER — INSULIN GLARGINE 100 [IU]/ML
14 INJECTION, SOLUTION SUBCUTANEOUS DAILY
Status: DISCONTINUED | OUTPATIENT
Start: 2020-12-16 | End: 2020-12-15

## 2020-12-15 RX ADMIN — FENTANYL CITRATE 50 MCG: 50 INJECTION, SOLUTION INTRAMUSCULAR; INTRAVENOUS at 03:26

## 2020-12-15 RX ADMIN — FENTANYL CITRATE 50 MCG: 50 INJECTION, SOLUTION INTRAMUSCULAR; INTRAVENOUS at 11:03

## 2020-12-15 RX ADMIN — INSULIN GLARGINE 25 UNITS: 100 INJECTION, SOLUTION SUBCUTANEOUS at 12:12

## 2020-12-15 RX ADMIN — MAGNESIUM SULFATE HEPTAHYDRATE 1 G: 1 INJECTION, SOLUTION INTRAVENOUS at 15:21

## 2020-12-15 RX ADMIN — SODIUM CHLORIDE 1000 ML: 9 INJECTION, SOLUTION INTRAVENOUS at 11:45

## 2020-12-15 RX ADMIN — FENTANYL CITRATE 50 MCG: 50 INJECTION, SOLUTION INTRAMUSCULAR; INTRAVENOUS at 07:54

## 2020-12-15 RX ADMIN — MAGNESIUM SULFATE HEPTAHYDRATE 1 G: 1 INJECTION, SOLUTION INTRAVENOUS at 13:54

## 2020-12-15 RX ADMIN — FENTANYL CITRATE 50 MCG: 50 INJECTION, SOLUTION INTRAMUSCULAR; INTRAVENOUS at 14:48

## 2020-12-15 RX ADMIN — METOCLOPRAMIDE 10 MG: 5 INJECTION, SOLUTION INTRAMUSCULAR; INTRAVENOUS at 11:06

## 2020-12-15 RX ADMIN — SODIUM CHLORIDE, PRESERVATIVE FREE 10 ML: 5 INJECTION INTRAVENOUS at 21:10

## 2020-12-15 RX ADMIN — FENTANYL CITRATE 50 MCG: 50 INJECTION, SOLUTION INTRAMUSCULAR; INTRAVENOUS at 05:32

## 2020-12-15 RX ADMIN — FENTANYL CITRATE 50 MCG: 50 INJECTION, SOLUTION INTRAMUSCULAR; INTRAVENOUS at 21:10

## 2020-12-15 RX ADMIN — METOCLOPRAMIDE 10 MG: 5 INJECTION, SOLUTION INTRAMUSCULAR; INTRAVENOUS at 02:23

## 2020-12-15 RX ADMIN — FENTANYL CITRATE 50 MCG: 50 INJECTION, SOLUTION INTRAMUSCULAR; INTRAVENOUS at 18:26

## 2020-12-15 RX ADMIN — FENTANYL CITRATE 50 MCG: 50 INJECTION, SOLUTION INTRAMUSCULAR; INTRAVENOUS at 23:50

## 2020-12-15 ASSESSMENT — PAIN DESCRIPTION - PROGRESSION
CLINICAL_PROGRESSION: GRADUALLY WORSENING
CLINICAL_PROGRESSION: GRADUALLY IMPROVING
CLINICAL_PROGRESSION: GRADUALLY IMPROVING

## 2020-12-15 ASSESSMENT — PAIN SCALES - GENERAL
PAINLEVEL_OUTOF10: 9
PAINLEVEL_OUTOF10: 8
PAINLEVEL_OUTOF10: 5
PAINLEVEL_OUTOF10: 10
PAINLEVEL_OUTOF10: 8
PAINLEVEL_OUTOF10: 9
PAINLEVEL_OUTOF10: 8
PAINLEVEL_OUTOF10: 8
PAINLEVEL_OUTOF10: 7
PAINLEVEL_OUTOF10: 10

## 2020-12-15 ASSESSMENT — PAIN DESCRIPTION - FREQUENCY: FREQUENCY: CONTINUOUS

## 2020-12-15 ASSESSMENT — PAIN DESCRIPTION - LOCATION: LOCATION: BACK

## 2020-12-15 ASSESSMENT — PAIN - FUNCTIONAL ASSESSMENT: PAIN_FUNCTIONAL_ASSESSMENT: ACTIVITIES ARE NOT PREVENTED

## 2020-12-15 ASSESSMENT — PAIN DESCRIPTION - ONSET: ONSET: ON-GOING

## 2020-12-15 ASSESSMENT — PAIN DESCRIPTION - PAIN TYPE: TYPE: ACUTE PAIN

## 2020-12-15 ASSESSMENT — PAIN DESCRIPTION - DESCRIPTORS: DESCRIPTORS: SHARP;STABBING

## 2020-12-15 NOTE — ED NOTES
Labs drawn ordered from 530 am, .  Call out to PCP following patient      Harlan Can RN  12/15/20 2124

## 2020-12-15 NOTE — ED NOTES
BGL under 250. D5/0.45 started and multiplier started.  (241-60)x0.03= 5.43 units/hr new rate     Yanet Boyce RN  12/14/20 7901

## 2020-12-15 NOTE — PROGRESS NOTES
[facility] Progress Note    Admitting Date and Time: 12/14/2020  3:34 PM  Admit Dx: DKA, type 1, not at goal [facility]) [E10.10]    Subjective:  Patient is being followed for DKA, type 1, not at goal [facility]) [E10.10]   Pt feels hungry, still lightheaded      ROS: denies fever, chills, cp, sob, HA unless stated above.       metoclopramide  10 mg Intravenous Q6H         dextrose, 12.5 g, PRN      potassium chloride, 10 mEq, PRN      magnesium sulfate, 1 g, PRN      sodium phosphate IVPB, 10 mmol, PRN    Or      sodium phosphate IVPB, 15 mmol, PRN    Or      sodium phosphate IVPB, 20 mmol, PRN      dextrose 5 % and 0.45 % NaCl, , Continuous PRN      fentanNYL, 50 mcg, Q2H PRN      ondansetron, 4 mg, Q6H PRN         Objective:    /65   Pulse 101   Temp 98 °F (36.7 °C) (Oral)   Resp 14   Ht 5' 1\" (1.549 m)   Wt 95 lb (43.1 kg)   SpO2 98%   BMI 17.95 kg/m²     General Appearance: alert and oriented to person, place and time and in no acute distress  Skin: warm and dry  Head: normocephalic and atraumatic  Eyes: pupils equal, round, and reactive to light, extraocular eye movements intact, conjunctivae normal  Neck: neck supple and non tender without mass   Pulmonary/Chest: clear to auscultation bilaterally- no wheezes, rales or rhonchi, normal air movement, no respiratory distress  Cardiovascular: normal rate, normal S1 and S2 and no carotid bruits  Abdomen: soft, non-tender, non-distended, normal bowel sounds, no masses or organomegaly  Extremities: no cyanosis, no clubbing and no edema  Neurologic: no cranial nerve deficit and speech normal        Recent Labs     12/14/20  1550 12/14/20  1550 12/14/20  2315 12/14/20  2315 12/15/20  0309 12/15/20  0320 12/15/20  0425 12/15/20  0522   *  --  133  --  134  --   --   --    K 5.3*  --  4.7  --  4.4  --   --   --    CL 85*  --  99  --  101  --   --   --    CO2 13*  --  16*  --  20*  --   --   --    BUN 24*  --  21*  --  17  --   --   -- inadvertent computerized transcription errors may be present.   Electronically signed by Michael Schuler MD on 12/15/2020 at 8:03 AM

## 2020-12-15 NOTE — ED NOTES
Spoke to Dr Cecilia Slater on [facility], continue with insulin protocol, verified insulin drip with Chhaya Cavanaugh RN from ICU, multiplier is 0.03 for this dosing,  Next dosing will be 0.04     Michaela Silva RN  12/15/20 0381

## 2020-12-15 NOTE — CONSULTS
nonobstructive abdomen. Grossly the stomach does not appear abnormally distended. Ct Abdomen Pelvis W Iv Contrast- Distended and fluid-filled stomach and proximal duodenum, with decompressed small bowel distal to the transverse segment of the duodenum. In addition the mesenteries angle appears to be reduced. The appearance raises concern for SMA syndrome. Patient had EGD at [facility] with Dr Pepe Gaytan 7/22/2020 which showed mild gastritis, biopsy done -Per care everywhere biopsies of the stomach and intestines are unremarkable. No H. pylori or celiac disease. No significant inflammation  Consultation for abdominal pain, DKA, possible SMA syndrome. Pt is known to Dr. Jade Godoy, last seen on admission 9/1/20. Currently, pt reports she is fatigued. Abdominal pain and nausea has resolved, continues to have back pain 5/10. Patient denies abdominal pain, n/v, melena, hematochezia, or hematemesis. Labs today , co2 16, glucose 469,  CMP and CBC ordered, awaiting results.     Past Medical History:        Diagnosis Date    Bleeding at insertion site 1/5/2018    Common femoral artery injury, right, initial encounter 1/5/2018    Depressive disorder     DM type 1 (diabetes mellitus, type 1) ([facility] [address]      Past Surgical History:        Procedure Laterality Date    ABDOMEN SURGERY N/A 5/9/2019    INCISION AND DRAINAGE OF SUPRAPUBIC ABSESS performed by Fred Canas MD at [address] [place] [address]      last yr     Current Medications:    Current Facility-Administered Medications: insulin glargine (LANTUS) injection vial 25 Units, 25 Units, Subcutaneous, Daily  dextrose 50 % IV solution, 12.5 g, Intravenous, PRN  potassium chloride 10 mEq/100 mL IVPB (Peripheral Line), 10 mEq, Intravenous, PRN  magnesium sulfate 1 g in dextrose 5% 100 mL IVPB, 1 g, Intravenous, PRN  sodium phosphate 10 mmol in dextrose 5 % 250 mL IVPB, 10 mmol, Intravenous, PRN **OR** sodium phosphate 15 mmol in dextrose 5 % 250 mL IVPB, 15 mmol, noted  motor strength is 5 out of 5 all extremities bilaterally  NEUROLOGIC:  Mental Status Exam:  Level of Alertness: awake  Orientation:   person, place, time  Motor Exam:  Motor exam is symmetrical 5 out of 5 all extremities bilaterally  SKIN:  normal skin color, texture, turgor    DATA:    CBC with Differential:    Lab Results   Component Value Date    WBC 5.4 12/14/2020    RBC 4.65 12/14/2020    HGB 11.6 12/14/2020    HCT 37.4 12/14/2020     12/14/2020    MCV 80.4 12/14/2020    MCH 24.9 12/14/2020    MCHC 31.0 12/14/2020    RDW 14.3 12/14/2020    NRBC 0.0 05/10/2019    SEGSPCT 58 09/29/2013    METASPCT 1.0 04/29/2020    LYMPHOPCT 43.7 12/14/2020    MONOPCT 4.6 12/14/2020    MYELOPCT 1.0 04/29/2020    BASOPCT 0.6 12/14/2020    ATYLYMREL 1.0 04/29/2020    MONOSABS 0.25 12/14/2020    LYMPHSABS 2.37 12/14/2020    EOSABS 0.01 12/14/2020    BASOSABS 0.03 12/14/2020     CMP:    Lab Results   Component Value Date     12/15/2020    K 3.2 12/15/2020    K 5.3 12/14/2020    CL 98 12/15/2020    CO2 13 12/15/2020    BUN 14 12/15/2020    CREATININE 0.5 12/15/2020    GFRAA >60 12/15/2020    LABGLOM >60 12/15/2020    GLUCOSE 118 12/15/2020    PROT 8.2 12/14/2020    LABALBU 4.2 12/14/2020    CALCIUM 9.4 12/15/2020    BILITOT 0.3 12/14/2020    ALKPHOS 105 12/14/2020    AST 34 12/14/2020    ALT 33 12/14/2020     Hepatic Function Panel:    Lab Results   Component Value Date    ALKPHOS 105 12/14/2020    ALT 33 12/14/2020    AST 34 12/14/2020    PROT 8.2 12/14/2020    BILITOT 0.3 12/14/2020    BILIDIR <0.2 12/14/2020    IBILI see below 12/14/2020    LABALBU 4.2 12/14/2020     PT/INR:    Lab Results   Component Value Date    PROTIME 10.7 12/14/2020    INR 0.9 12/14/2020     PTT:    Lab Results   Component Value Date    APTT 35.3 02/10/2019   [APTT}  Last 3 Troponin:    Lab Results   Component Value Date    TROPONINI <0.01 12/14/2020    TROPONINI <0.01 11/10/2020    TROPONINI <0.01 10/18/2020     TSH:  No results found for: TSH  VITAMIN B12: No results found for: Marijejarrett Cords:  No results found for: FOLATE  IRON:    Lab Results   Component Value Date    IRON 38 10/19/2020     Iron Saturation:    Lab Results   Component Value Date    LABIRON 13 10/19/2020     TIBC:    Lab Results   Component Value Date    TIBC 294 10/19/2020     FERRITIN:    Lab Results   Component Value Date    FERRITIN 64 10/18/2020     HIV:  No results found for: HIV  LUIS:  No results found for: ANATITER, LUIS  No components found for: CHLPL     Corky  Lab Results   Component Value Date    TRIG 106 01/24/2018   ABV  Lab Results   Component Value Date    HDL 40 01/24/2018     R  Lab Results   Component Value Date    LDLCALC 114 (H) 01/24/2018   esult Date: 12/15/2020  EXAMINATION: ONE SUPINE XRAY VIEW(S) OF THE ABDOMEN 12/15/2020 7:32 am COMPARISON: 11/10/2020 HISTORY: ORDERING SYSTEM PROVIDED HISTORY: assess stomach bubble, abdominal pain TECHNOLOGIST PROVIDED HISTORY: Reason for exam:->assess stomach bubble, abdominal pain FINDINGS: The bowel gas pattern is nonspecific and nonobstructive. There is air and stool in normal caliber colon. There is gas scattered within nondilated small bowel. Radiographically, the stomach does not appear abnormally distended. There are no suspicious calcifications seen. Contrast material is seen within the bladder. Nonspecific, nonobstructive abdomen. Grossly the stomach does not appear abnormally distended. Is    Ct Abdomen Pelvis W Iv Contrast Additional Contrast? None    Result Date: 12/14/2020  EXAMINATION: CT OF THE ABDOMEN AND PELVIS WITH CONTRAST 12/14/2020 5:32 pm TECHNIQUE: CT of the abdomen and pelvis was performed with the administration of intravenous contrast. Multiplanar reformatted images are provided for review. Dose modulation, iterative reconstruction, and/or weight based adjustment of the mA/kV was utilized to reduce the radiation dose to as low as reasonably achievable. COMPARISON: None.  HISTORY: ORDERING SYSTEM PROVIDED HISTORY: abdominal pain TECHNOLOGIST PROVIDED HISTORY: Additional Contrast?->None Reason for exam:->abdominal pain FINDINGS: Lower Chest: Clear. No infiltrates November 10, 2020 Organs: Unremarkable GI/Bowel: The stomach and proximal duodenum is distended and filled with fluid. The small bowel distal to the transfers duodenum is decompressed. Large amount of retained stool noted in the colon. Pelvis: Minimal free fluid, likely physiologic. The bladder is incompletely distended, resulting in pseudo thickening of the bladder wall. Peritoneum/Retroperitoneum: No ascites, free air or adenopathy. Bones/Soft Tissues: Unremarkable. Distended and fluid-filled stomach and proximal duodenum, with decompressed small bowel distal to the transverse segment of the duodenum. In addition the mesenteries angle appears to be reduced. The appearance raises concern for SMA syndrome. Critical results were called by Dr. Jory Riddle to [facility] on 12/14/2020 at 18:19. Xr Abdomen For Ng/og/ne Tube Placement    Result Date: 12/14/2020  EXAMINATION: ONE SUPINE XRAY VIEW(S) OF THE ABDOMEN 12/14/2020 8:24 pm COMPARISON: None. HISTORY: ORDERING SYSTEM PROVIDED HISTORY: Confirmation of course of NG/OG/NE tube and location of tip of tube TECHNOLOGIST PROVIDED HISTORY: Reason for exam:->Confirmation of course of NG/OG/NE tube and location of tip of tube Portable? ->Yes FINDINGS: Tip of NG tube noted in the region of stomach. Bowel gas pattern is nonobstructive. No free air. Residual contrast noted in the renal collecting systems, presumably from recent contrast enhanced CT scan. Tip of NG tube below the diaphragm. Xr Hip 2-3 Vw W Pelvis Right    Result Date: 12/6/2020  EXAMINATION: ONE XRAY VIEW OF THE PELVIS AND TWO XRAY VIEWS RIGHT HIP 12/6/2020 3:56 pm COMPARISON: None.  HISTORY: ORDERING SYSTEM PROVIDED HISTORY: Fall TECHNOLOGIST PROVIDED HISTORY: Reason for exam:->Fall FINDINGS: The hip

## 2020-12-15 NOTE — H&P
[facility] History and Physical        Chief Complaint:  dka  History of Present Illness   The patient is a 23 y.o. female    dr Emma Daniel endocrine and dr Farideh Guzman GI and pcp is dr Matias Allan   dm1-on basaglar and humalog and states she hasnt missed any doses  Known DKA type 1, recurrent DKA admissions-- ?gastroparesis suspected? No hx of reglan use  Seen GI    Hx of covid in OCT- NOV-- denies uri, fevers/chills/cough since    [facility] came to ER bc of severe abd pain, and Ongoing N/V and diarrhea though aggrevation of inc BS  In ER they placed NG tube which helped some abd pain/distention and nausea. She was seen on 12/6 in the ED and not in dka at that time but very hyperglycemic  She claims she took her basaglar 24 units last evening on 12/13    - hx taken from the patient  REVIEW OF SYSTEMS:  no fevers, chills, cp, sob, n/v, ha, vision/hearing changes, wt changes, hot/cold flashes, other open skin lesions, diarrhea, constipation, dysuria/hematuria unless noted in HPI. Complete ROS performed with the patient and is otherwise negative. Past Medical History:      Diagnosis Date    Bleeding at insertion site 1/5/2018    Common femoral artery injury, right, initial encounter 1/5/2018    Depressive disorder     DM type 1 (diabetes mellitus, type 1) ([facility] [address]        Past Surgical History:        Procedure Laterality Date    ABDOMEN SURGERY N/A 5/9/2019    INCISION AND DRAINAGE OF SUPRAPUBIC ABSESS performed by Yesenia Thomas MD at [facility]      last yr       Home Medications:  Prior to Admission medications    Medication Sig Start Date End Date Taking?  Authorizing Provider   insulin aspart (NOVOLOG) 100 UNIT/ML injection vial Inject 0-20 Units into the skin 3 times daily (before meals) *Per Sliding Scale*    Historical Provider, MD   insulin glargine (BASAGLAR KWIKPEN) 100 UNIT/ML injection pen Inject 25 Units into the skin nightly    Historical Provider, MD ibuprofen (ADVIL;MOTRIN) 200 MG tablet Take 200 mg by mouth every 6 hours as needed for Pain    Historical Provider, MD       Allergies:  Patient has no known allergies. Social History:   TOBACCO:   reports that she has never smoked. She has never used smokeless tobacco.  ETOH:   reports no history of alcohol use. Family History:       Problem Relation Age of Onset    Diabetes Maternal Grandmother     Diabetes Paternal Grandmother     Stroke Other     Thyroid Disease Neg Hx       Or deferred/otherwise considered non contributory to current admission  PHYSICAL EXAM:    VS: /63   Pulse 120   Resp 18   Ht 5' 1\" (1.549 m)   Wt 95 lb (43.1 kg)   SpO2 100%   BMI 17.95 kg/m²     General Appearance:     + acute distress. Psych:  HEENT:    A.O. As per HPI details  NC/AT, PERRL, no pallor no icterus, lips/ext mucous membrane grossly dry +NG tube   Neck:   Supple, trachea midline, no obvious JVD   Resp:     CTAB, No wheezes, No rhonchi   Chest wall:    No tenderness or deformity   Heart:    Regular rate and rhythm, S1 and S2 normal, no rub or gallop. Abdomen:     Soft, generalized ++-tender, bowel sounds active    no suspicious obvious masses/organomegaly   Genitalia & Rectal:    Deferred.    Extremities x4:   Extremities normal, atraumatic, no cyanosis, no clubbing   Musculoskeletal:      NO active synovitis or swollen b/l wrists, 2-5 MCPs examined   Skin:   Skin color, texture, turgor fairly normal, no ACUTE rashes or lesions in lower legs and arms examined   Lymph nodes:   Cervical nodes grossly normal   Neurologic:  .Grossly symmetric  strength in UEs and LEs with symmetric grossly decto light touch sensation     LABS:  CBC:   Lab Results   Component Value Date    WBC 5.4 12/14/2020    RBC 4.65 12/14/2020    HGB 11.6 12/14/2020    HCT 37.4 12/14/2020     12/14/2020    MCV 80.4 12/14/2020     BMP:    Lab Results   Component Value Date     12/14/2020    K 5.3 12/14/2020    CL 85 12/14/2020    CO2 13 12/14/2020    BUN 24 12/14/2020    CREATININE 0.6 12/14/2020    GLUCOSE 468 12/14/2020    CALCIUM 9.9 12/14/2020     Hepatic Function Panel:    Lab Results   Component Value Date    ALKPHOS 105 12/14/2020    AST 34 12/14/2020    ALT 33 12/14/2020    PROT 8.2 12/14/2020    LABALBU 4.2 12/14/2020    BILITOT 0.3 12/14/2020     Magnesium:    Lab Results   Component Value Date    MG 1.7 10/20/2020       PT/INR:    Lab Results   Component Value Date    PROTIME 10.7 12/14/2020    INR 0.9 12/14/2020     U/A:   Lab Results   Component Value Date    NITRITE negative 04/17/2018    LEUKOCYTESUR Negative 11/10/2020    PHUR 6.0 11/10/2020    WBCUA 0-1 10/18/2020    RBCUA 0-1 10/18/2020    BACTERIA FEW 10/18/2020    SPECGRAV 1.010 11/10/2020    BLOODU Negative 11/10/2020    GLUCOSEU >=1000 11/10/2020     ABG:  No results found for: PHART, LLD8RNI, PO2ART, X3QKMGZX, CFN1PJS, BEART  TSH:  No results found for: TSH  Cardiac Enzymes:   Lab Results   Component Value Date    CKTOTAL 99 10/18/2020    CKTOTAL 277 (H) 10/09/2020    CKTOTAL 282 (H) 10/08/2020    CKMB <1.0 10/18/2020    TROPONINI <0.01 12/14/2020    TROPONINI <0.01 11/10/2020    TROPONINI <0.01 10/18/2020       Radiology: Ct Abdomen Pelvis W Iv Contrast Additional Contrast? None    Result Date: 12/14/2020  EXAMINATION: CT OF THE ABDOMEN AND PELVIS WITH CONTRAST 12/14/2020 5:32 pm TECHNIQUE: CT of the abdomen and pelvis was performed with the administration of intravenous contrast. Multiplanar reformatted images are provided for review. Dose modulation, iterative reconstruction, and/or weight based adjustment of the mA/kV was utilized to reduce the radiation dose to as low as reasonably achievable. COMPARISON: None. HISTORY: ORDERING SYSTEM PROVIDED HISTORY: abdominal pain TECHNOLOGIST PROVIDED HISTORY: Additional Contrast?->None Reason for exam:->abdominal pain FINDINGS: Lower Chest: Clear.   No infiltrates November 10, 2020 Organs: Unremarkable GI/Bowel: aggrevation of inc BS  In ER they placed NG tube which helped some abd pain/distention and nausea. She was seen on 12/6 in the ED and not in dka at that time but very hyperglycemic  She claims she took her basaglar 24 units last evening on 12/13  Overnight IV fludis, insulin drip, plan micu  Will trial reglan, consult GI-- gastric emptying at later date  CT- rule out SMA  Distended and fluid-filled stomach and proximal duodenum, with decompressed   small bowel distal to the transverse segment of the duodenum.   In addition   the mesenteries angle appears to be reduced         Shant Sebastian MD   Night Officer, overnight admitting doctor at [facility] call day time doctor   for questions after 7:30am    Covering for [address] [facility]  If Qs please call [phone]  Electronically signed by Lindaann Holstein, MD on 12/14/2020 at 7:56 PM'

## 2020-12-15 NOTE — CONSULTS
GENERAL SURGERY  CONSULT NOTE  12/15/2020    Physician Consulted: Dr. Bhakti Santos  Reason for Consult: Abdominal pain  Referring Physician: Dr. Lev FELIZ  Abdulaziz John is a 23 y.o. female who presents for evaluation of abdominal pain. Past medical history significant for DM type 1, several episodes of DKA. Patient states she has had this pain since February and has had a scope which was normal.  She states she becomes bloated and full, and has pain with every meal even if very small. She has been seen for this by GI with Dr. Yissel Lund and at the [facility]. She vaguely remembers eating eggs and having her belly scanned; the gastric emptying study done in September 2020 showed normal and uniform gastric emptying. A HIDA performed in September 2020 was negative as well. Past Medical History:   Diagnosis Date    Bleeding at insertion site 1/5/2018    Common femoral artery injury, right, initial encounter 1/5/2018    Depressive disorder     DM type 1 (diabetes mellitus, type 1) ([facility] [address]        Past Surgical History:   Procedure Laterality Date    ABDOMEN SURGERY N/A 5/9/2019    INCISION AND DRAINAGE OF SUPRAPUBIC ABSESS performed by Laurie Angeles MD at [address] [place] [address]      last yr       Medications Prior to Admission:    Prior to Admission medications    Medication Sig Start Date End Date Taking?  Authorizing Provider   insulin aspart (NOVOLOG) 100 UNIT/ML injection vial Inject 0-20 Units into the skin 3 times daily (before meals) *Per Sliding Scale*    Historical Provider, MD   insulin glargine (BASAGLAR KWIKPEN) 100 UNIT/ML injection pen Inject 25 Units into the skin nightly    Historical Provider, MD   ibuprofen (ADVIL;MOTRIN) 200 MG tablet Take 200 mg by mouth every 6 hours as needed for Pain    Historical Provider, MD       No Known Allergies    Family History   Problem Relation Age of Onset    Diabetes Maternal Grandmother     Diabetes Paternal Grandmother     Stroke Other  Thyroid Disease Neg Hx        Social History     Tobacco Use    Smoking status: Never Smoker    Smokeless tobacco: Never Used   Substance Use Topics    Alcohol use: No    Drug use: No         Review of Systems   General ROS: negative  Hematological and Lymphatic ROS: negative  Respiratory ROS: no cough, shortness of breath, or wheezing  Cardiovascular ROS: no chest pain or dyspnea on exertion  Gastrointestinal ROS: positive for - abdominal pain and gas/bloating  Genito-Urinary ROS: no dysuria, trouble voiding, or hematuria  Musculoskeletal ROS: negative      PHYSICAL EXAM:    Vitals:    12/15/20 0601   BP: (!) 100/59   Pulse: 103   Resp:    Temp:    SpO2: 99%       General Appearance:  awake, alert, oriented, in no acute distress  Skin:  Skin color, texture, turgor normal. No rashes or lesions. Head/face:  NCAT  Eyes:  No gross abnormalities. Lungs:  Normal expansion. Clear to auscultation. No rales, rhonchi, or wheezing. Heart:  Heart sounds are normal.  Regular rate and rhythm without murmur, gallop or rub. Abdomen:  Palpation: Tenderness: mild  Extremities: Extremities warm to touch, pink, with no edema. LABS:    CBC  Recent Labs     12/14/20  1550   WBC 5.4   HGB 11.6   HCT 37.4        BMP  Recent Labs     12/15/20  0309      K 4.4      CO2 20*   BUN 17   CREATININE 0.5   CALCIUM 9.6     Liver Function  Recent Labs     12/14/20  1550   LIPASE 16   BILITOT 0.3   BILIDIR <0.2   AST 34*   ALT 33*   ALKPHOS 105*   PROT 8.2   LABALBU 4.2     No results for input(s): LACTATE in the last 72 hours. Recent Labs     12/14/20  1550   INR 0.9       RADIOLOGY    Ct Abdomen Pelvis W Iv Contrast Additional Contrast? None    Result Date: 12/14/2020  EXAMINATION: CT OF THE ABDOMEN AND PELVIS WITH CONTRAST 12/14/2020 5:32 pm TECHNIQUE: CT of the abdomen and pelvis was performed with the administration of intravenous contrast. Multiplanar reformatted images are provided for review.  Dose modulation, iterative reconstruction, and/or weight based adjustment of the mA/kV was utilized to reduce the radiation dose to as low as reasonably achievable. COMPARISON: None. HISTORY: ORDERING SYSTEM PROVIDED HISTORY: abdominal pain TECHNOLOGIST PROVIDED HISTORY: Additional Contrast?->None Reason for exam:->abdominal pain FINDINGS: Lower Chest: Clear. No infiltrates November 10, 2020 Organs: Unremarkable GI/Bowel: The stomach and proximal duodenum is distended and filled with fluid. The small bowel distal to the transfers duodenum is decompressed. Large amount of retained stool noted in the colon. Pelvis: Minimal free fluid, likely physiologic. The bladder is incompletely distended, resulting in pseudo thickening of the bladder wall. Peritoneum/Retroperitoneum: No ascites, free air or adenopathy. Bones/Soft Tissues: Unremarkable. Distended and fluid-filled stomach and proximal duodenum, with decompressed small bowel distal to the transverse segment of the duodenum. In addition the mesenteries angle appears to be reduced. The appearance raises concern for SMA syndrome. Critical results were called by Dr. Audie Green to [facility] on 12/14/2020 at 18:19. Xr Abdomen For Ng/og/ne Tube Placement    Result Date: 12/14/2020  EXAMINATION: ONE SUPINE XRAY VIEW(S) OF THE ABDOMEN 12/14/2020 8:24 pm COMPARISON: None. HISTORY: ORDERING SYSTEM PROVIDED HISTORY: Confirmation of course of NG/OG/NE tube and location of tip of tube TECHNOLOGIST PROVIDED HISTORY: Reason for exam:->Confirmation of course of NG/OG/NE tube and location of tip of tube Portable? ->Yes FINDINGS: Tip of NG tube noted in the region of stomach. Bowel gas pattern is nonobstructive. No free air. Residual contrast noted in the renal collecting systems, presumably from recent contrast enhanced CT scan. Tip of NG tube below the diaphragm.         ASSESSMENT:  23 y.o. female with DM type 1 and DKA with concern for SMA syndrome     PLAN:    - recent gastric emptying study and HIDA negative  - sees GI  - will obtain KUB to assess for gastric bubble  - If patient has persistent gastric dilation, will likely need CorPak in the jejunum for nutritional support    Discussed with Yessica Pressley, Chief Resident, and Dr. Vladimir Macedo    Electronically signed by Nidhi Méndez DO on 12/15/20 at 6:31 AM EST and Electronically signed by Arabella Samano MD on 12/15/2020 at 6:59 AM

## 2020-12-15 NOTE — ED NOTES
Spoke with Dr. Rodrigo Shultz and Dr Shalom Hdz regarding current BS at 0212 and the dextrose fluids that are infusing and was advised to leave them in place, make no changes.      Luis A Gill RN  12/15/20 [id number]

## 2020-12-15 NOTE — ED NOTES
This nurse spoke to Lena Young in ICU to consult with her regarding Dextrose 51/2 infusion due to current BS of 312, she advises nurse to continue infusion.       Ofelia Padilla RN  12/15/20 [id number]

## 2020-12-16 LAB
ANION GAP SERPL CALCULATED.3IONS-SCNC: 13 MMOL/L (ref 7–16)
ANION GAP SERPL CALCULATED.3IONS-SCNC: 9 MMOL/L (ref 7–16)
BILIRUBIN URINE: NEGATIVE
BLOOD, URINE: NEGATIVE
BUN BLDV-MCNC: 11 MG/DL (ref 6–20)
BUN BLDV-MCNC: 12 MG/DL (ref 6–20)
CALCIUM SERPL-MCNC: 8.8 MG/DL (ref 8.6–10.2)
CALCIUM SERPL-MCNC: 9.3 MG/DL (ref 8.6–10.2)
CHLORIDE BLD-SCNC: 102 MMOL/L (ref 98–107)
CHLORIDE BLD-SCNC: 106 MMOL/L (ref 98–107)
CLARITY: CLEAR
CO2: 19 MMOL/L (ref 22–29)
CO2: 22 MMOL/L (ref 22–29)
COLOR: NORMAL
CREAT SERPL-MCNC: 0.6 MG/DL (ref 0.5–1)
CREAT SERPL-MCNC: 0.7 MG/DL (ref 0.5–1)
GFR AFRICAN AMERICAN: >60
GFR AFRICAN AMERICAN: >60
GFR NON-AFRICAN AMERICAN: >60 ML/MIN/1.73
GFR NON-AFRICAN AMERICAN: >60 ML/MIN/1.73
GLUCOSE BLD-MCNC: 132 MG/DL (ref 74–99)
GLUCOSE BLD-MCNC: 216 MG/DL (ref 74–99)
GLUCOSE URINE: NEGATIVE MG/DL
KETONES, URINE: NEGATIVE MG/DL
LEUKOCYTE ESTERASE, URINE: NEGATIVE
MAGNESIUM: 1.7 MG/DL (ref 1.6–2.6)
MAGNESIUM: 1.8 MG/DL (ref 1.6–2.6)
METER GLUCOSE: 122 MG/DL (ref 74–99)
METER GLUCOSE: 155 MG/DL (ref 74–99)
METER GLUCOSE: 205 MG/DL (ref 74–99)
METER GLUCOSE: 216 MG/DL (ref 74–99)
METER GLUCOSE: 62 MG/DL (ref 74–99)
METER GLUCOSE: 83 MG/DL (ref 74–99)
NITRITE, URINE: NEGATIVE
PH UA: 5.5 (ref 5–9)
PHOSPHORUS: 3.2 MG/DL (ref 2.5–4.5)
PHOSPHORUS: 3.8 MG/DL (ref 2.5–4.5)
POTASSIUM SERPL-SCNC: 3.9 MMOL/L (ref 3.5–5)
POTASSIUM SERPL-SCNC: 4.3 MMOL/L (ref 3.5–5)
PROTEIN UA: NEGATIVE MG/DL
SODIUM BLD-SCNC: 134 MMOL/L (ref 132–146)
SODIUM BLD-SCNC: 137 MMOL/L (ref 132–146)
SPECIFIC GRAVITY UA: 1.02 (ref 1–1.03)
UROBILINOGEN, URINE: 0.2 E.U./DL

## 2020-12-16 PROCEDURE — 6370000000 HC RX 637 (ALT 250 FOR IP): Performed by: INTERNAL MEDICINE

## 2020-12-16 PROCEDURE — 6360000002 HC RX W HCPCS: Performed by: INTERNAL MEDICINE

## 2020-12-16 PROCEDURE — 82962 GLUCOSE BLOOD TEST: CPT

## 2020-12-16 PROCEDURE — 87088 URINE BACTERIA CULTURE: CPT

## 2020-12-16 PROCEDURE — 1200000000 HC SEMI PRIVATE

## 2020-12-16 PROCEDURE — 84100 ASSAY OF PHOSPHORUS: CPT

## 2020-12-16 PROCEDURE — 36415 COLL VENOUS BLD VENIPUNCTURE: CPT

## 2020-12-16 PROCEDURE — 99233 SBSQ HOSP IP/OBS HIGH 50: CPT | Performed by: INTERNAL MEDICINE

## 2020-12-16 PROCEDURE — 80048 BASIC METABOLIC PNL TOTAL CA: CPT

## 2020-12-16 PROCEDURE — 6370000000 HC RX 637 (ALT 250 FOR IP): Performed by: NURSE PRACTITIONER

## 2020-12-16 PROCEDURE — 99232 SBSQ HOSP IP/OBS MODERATE 35: CPT | Performed by: INTERNAL MEDICINE

## 2020-12-16 PROCEDURE — 81003 URINALYSIS AUTO W/O SCOPE: CPT

## 2020-12-16 PROCEDURE — 83735 ASSAY OF MAGNESIUM: CPT

## 2020-12-16 PROCEDURE — 2580000003 HC RX 258

## 2020-12-16 RX ORDER — ORPHENADRINE CITRATE 30 MG/ML
60 INJECTION INTRAMUSCULAR; INTRAVENOUS EVERY 12 HOURS
Status: DISCONTINUED | OUTPATIENT
Start: 2020-12-16 | End: 2020-12-17 | Stop reason: HOSPADM

## 2020-12-16 RX ORDER — HYDROCODONE BITARTRATE AND ACETAMINOPHEN 5; 325 MG/1; MG/1
1 TABLET ORAL EVERY 4 HOURS PRN
Status: DISCONTINUED | OUTPATIENT
Start: 2020-12-16 | End: 2020-12-17 | Stop reason: HOSPADM

## 2020-12-16 RX ORDER — SODIUM CHLORIDE 0.9 % (FLUSH) 0.9 %
SYRINGE (ML) INJECTION
Status: COMPLETED
Start: 2020-12-16 | End: 2020-12-16

## 2020-12-16 RX ORDER — HYDROCODONE BITARTRATE AND ACETAMINOPHEN 5; 325 MG/1; MG/1
1 TABLET ORAL EVERY 6 HOURS PRN
Status: DISCONTINUED | OUTPATIENT
Start: 2020-12-16 | End: 2020-12-16

## 2020-12-16 RX ORDER — DOCUSATE SODIUM 100 MG/1
200 CAPSULE, LIQUID FILLED ORAL DAILY
Status: DISCONTINUED | OUTPATIENT
Start: 2020-12-16 | End: 2020-12-17 | Stop reason: HOSPADM

## 2020-12-16 RX ADMIN — DOCUSATE SODIUM 200 MG: 100 CAPSULE, LIQUID FILLED ORAL at 16:51

## 2020-12-16 RX ADMIN — HYDROCODONE BITARTRATE AND ACETAMINOPHEN 1 TABLET: 5; 325 TABLET ORAL at 17:18

## 2020-12-16 RX ADMIN — INSULIN LISPRO 3 UNITS: 100 INJECTION, SOLUTION INTRAVENOUS; SUBCUTANEOUS at 16:43

## 2020-12-16 RX ADMIN — HYDROCODONE BITARTRATE AND ACETAMINOPHEN 1 TABLET: 5; 325 TABLET ORAL at 22:04

## 2020-12-16 RX ADMIN — FENTANYL CITRATE 50 MCG: 50 INJECTION, SOLUTION INTRAMUSCULAR; INTRAVENOUS at 02:42

## 2020-12-16 RX ADMIN — SODIUM CHLORIDE, PRESERVATIVE FREE: 5 INJECTION INTRAVENOUS at 04:28

## 2020-12-16 RX ADMIN — FENTANYL CITRATE 50 MCG: 50 INJECTION, SOLUTION INTRAMUSCULAR; INTRAVENOUS at 08:58

## 2020-12-16 RX ADMIN — INSULIN LISPRO 1 UNITS: 100 INJECTION, SOLUTION INTRAVENOUS; SUBCUTANEOUS at 20:48

## 2020-12-16 RX ADMIN — INSULIN GLARGINE 14 UNITS: 100 INJECTION, SOLUTION SUBCUTANEOUS at 08:55

## 2020-12-16 RX ADMIN — FENTANYL CITRATE 50 MCG: 50 INJECTION, SOLUTION INTRAMUSCULAR; INTRAVENOUS at 05:44

## 2020-12-16 RX ADMIN — INSULIN LISPRO 4 UNITS: 100 INJECTION, SOLUTION INTRAVENOUS; SUBCUTANEOUS at 08:55

## 2020-12-16 RX ADMIN — INSULIN LISPRO 1 UNITS: 100 INJECTION, SOLUTION INTRAVENOUS; SUBCUTANEOUS at 08:56

## 2020-12-16 RX ADMIN — ORPHENADRINE CITRATE 60 MG: 30 INJECTION INTRAMUSCULAR; INTRAVENOUS at 16:52

## 2020-12-16 RX ADMIN — HYDROCODONE BITARTRATE AND ACETAMINOPHEN 1 TABLET: 5; 325 TABLET ORAL at 11:17

## 2020-12-16 ASSESSMENT — PAIN SCALES - GENERAL
PAINLEVEL_OUTOF10: 8
PAINLEVEL_OUTOF10: 10
PAINLEVEL_OUTOF10: 7
PAINLEVEL_OUTOF10: 5
PAINLEVEL_OUTOF10: 9
PAINLEVEL_OUTOF10: 8
PAINLEVEL_OUTOF10: 7
PAINLEVEL_OUTOF10: 6
PAINLEVEL_OUTOF10: 8
PAINLEVEL_OUTOF10: 10

## 2020-12-16 ASSESSMENT — PAIN DESCRIPTION - PAIN TYPE
TYPE: ACUTE PAIN

## 2020-12-16 ASSESSMENT — PAIN DESCRIPTION - FREQUENCY
FREQUENCY: CONTINUOUS
FREQUENCY: CONTINUOUS

## 2020-12-16 ASSESSMENT — PAIN DESCRIPTION - LOCATION
LOCATION: ABDOMEN;BACK
LOCATION: ABDOMEN;BACK
LOCATION: BACK;ABDOMEN

## 2020-12-16 ASSESSMENT — PAIN DESCRIPTION - DESCRIPTORS
DESCRIPTORS: STABBING;SHARP
DESCRIPTORS: SHARP;STABBING

## 2020-12-16 ASSESSMENT — PAIN DESCRIPTION - PROGRESSION: CLINICAL_PROGRESSION: GRADUALLY WORSENING

## 2020-12-16 ASSESSMENT — PAIN DESCRIPTION - ONSET
ONSET: ON-GOING
ONSET: ON-GOING

## 2020-12-16 ASSESSMENT — PAIN - FUNCTIONAL ASSESSMENT: PAIN_FUNCTIONAL_ASSESSMENT: ACTIVITIES ARE NOT PREVENTED

## 2020-12-16 NOTE — PROGRESS NOTES
GENERAL SURGERY  DAILY PROGRESS NOTE  12/16/2020    Chief Complaint   Patient presents with    Hyperglycemia     \"HI\" at home- feeling \"sick\" x 1 week- hx DKA       Subjective: Tolerated diet last night, denies nausea or vomiting. Pain is improved.     Objective:  /72   Pulse 107   Temp 97.8 °F (36.6 °C) (Oral)   Resp 17   Ht 5' 1\" (1.549 m)   Wt 95 lb (43.1 kg)   SpO2 100%   BMI 17.95 kg/m²     GENERAL:  Laying in bed, awake, alert, cooperative, no apparent distress  HEAD: Normocephalic, atraumatic  EYES: No sclera icterus, pupils equal  LUNGS:  No increased work of breathing  CARDIOVASCULAR:  RR  ABDOMEN:  Soft, mild tenderness to palpation in upper abdomen, non-distended  EXTREMITIES: No edema or swelling  SKIN: Warm and dry    Assessment/Plan:  23 y.o. female abdominal pain from DKA    Pain improving  GI following- planning an EGD in the near future  Monitor abdominal exam    Electronically signed by Satya Byers MD on 12/16/2020 at 6:16 AM

## 2020-12-16 NOTE — PROGRESS NOTES
[facility] Progress Note    Admitting Date and Time: 12/14/2020  3:34 PM  Admit Dx: DKA, type 1, not at goal [facility]) [E10.10]    Subjective:  Patient is being followed for DKA, type 1, not at goal [facility]) [E10.10]   Pt feels abd pain, still lightheaded      ROS: denies fever, chills, cp, sob, HA unless stated above.       insulin lispro  4 Units Subcutaneous TID WC    insulin lispro  0-6 Units Subcutaneous TID WC    insulin lispro  0-3 Units Subcutaneous Nightly    insulin glargine  14 Units Subcutaneous QAM    metoclopramide  10 mg Intravenous Q6H         glucose, 15 g, PRN      dextrose, 12.5 g, PRN      glucagon (rDNA), 1 mg, PRN      dextrose, 100 mL/hr, PRN      dextrose, 12.5 g, PRN      potassium chloride, 10 mEq, PRN      magnesium sulfate, 1 g, PRN      sodium phosphate IVPB, 10 mmol, PRN    Or      sodium phosphate IVPB, 15 mmol, PRN    Or      sodium phosphate IVPB, 20 mmol, PRN      dextrose 5 % and 0.45 % NaCl, , Continuous PRN      fentanNYL, 50 mcg, Q2H PRN      ondansetron, 4 mg, Q6H PRN         Objective:    BP (!) 142/103   Pulse 105   Temp 97.4 °F (36.3 °C) (Oral)   Resp 17   Ht 5' 1\" (1.549 m)   Wt 95 lb (43.1 kg)   SpO2 100%   BMI 17.95 kg/m²     General Appearance: alert and oriented to person, place and time and in no acute distress  Skin: warm and dry  Head: normocephalic and atraumatic  Eyes: pupils equal, round, and reactive to light, extraocular eye movements intact, conjunctivae normal  Neck: neck supple and non tender without mass   Pulmonary/Chest: clear to auscultation bilaterally- no wheezes, rales or rhonchi, normal air movement, no respiratory distress  Cardiovascular: normal rate, normal S1 and S2 and no carotid bruits  Abdomen: soft, non-tender, non-distended, normal bowel sounds, no masses or organomegaly  Extremities: no cyanosis, no clubbing and no edema  Neurologic: no cranial nerve deficit and speech normal        Recent Labs 12/15/20  2250 12/16/20  0240 12/16/20  0704    134 137   K 4.1 4.3 3.9    102 106   CO2 20* 19* 22   BUN 9 12 11   CREATININE 0.6 0.7 0.6   GLUCOSE 135* 216* 132*   CALCIUM 8.9 8.8 9.3       Recent Labs     12/14/20  1550   WBC 5.4   RBC 4.65   HGB 11.6   HCT 37.4   MCV 80.4   MCH 24.9*   MCHC 31.0*   RDW 14.3      MPV 11.2       Assessment:    Principal Problem:    DKA, type 1, not at goal [facility])  Active Problems:    Personal history of noncompliance with medical treatment, presenting hazards to health    Severe dehydration    Generalized abdominal pain    Nausea, vomiting and diarrhea  Resolved Problems:    * No resolved hospital problems. *      Plan:  1. DKA type I, presented with blood glucose of 700, anion gap 29 bicarb 13 with elevated beta hydroxybutyrate, start the patient on IV fluids and insulin drip, anion gap closed down to 13 but blood glucose went up to 400s on repeated another BMP AG is still closed, BG down to 200, stop insulin drip, startedon lantus and SSI, started diet  2. Abdominal pain, might be related to DKA yet CT scan finding suggesting gastroparesis or SMA syndrome, surgery was consulted, plans to repeat KUB and possible CorPak placement. 3.  Diabetes type 1, not controlled despite the patient claiming that she does not miss her insulin, A1c 2 months ago was 11.2,, continue on 14 units of lantus and 4 units of lispro, appreciate endo input, became hypoglycemic later in the 60's, will discuss with endo readjustment of her insulin dosing. 4.  Hyponatremia, most likely pseudohyponatremia due to hyperglycemia resolved with correction of DKA. 5. Hypotension and tachycardia, dehydration, improved with fluids. I have spent 32  minutes in critical care time excluding independently billable procedures        NOTE: This report was transcribed using voice recognition software.  Every effort was made to ensure accuracy; however, inadvertent computerized transcription errors may be present.   Electronically signed by Grace William MD on 12/16/2020 at 9:56 AM

## 2020-12-16 NOTE — CONSULTS
ENDOCRINOLOGY INITIAL CONSULTATION NOTE       Date of admission: 12/14/2020  Date of service: 12/15/2020  Admitting physician: Georgia Olson MD   Primary Care Physician: Ana María Kirby DO  Consultant physician: Iris Calvillo MD     Reason for the consultation:  DM type 1 admitted with DKA     History of Present Illness:  Services were provided through a video synchronous discussion     Bing Montano is a 23 y.o. old female with PMH of DM type 1 admitted to [facility] on 12/14/2020 because of abdomina pain, nausea and vomiting and found to be in DKA, endocrine team was consulted for diabetes management. Pt had EGD in February/2020 which was normal. Gastric emptying study done in September 2020 showed normal and uniform gastric emptying. A HIDA performed in September 2020 was negative as well.       The patient has been in and out of the hospital frequently with DKA due to noncompliance with diabetic management. The patient denied cough, fever, chills, chest pain or SOB    Up on arrival to ED, BS >500, AG 29, Cr 0.9, Bicarb 13, K 5.3     Prior to admission  Type 1 DM was diagnosed at the age 8. Prior to admission, she was basaglar 25 units daily, Humalog with meals using carb ratio 1u:10g  + ss 1:50>150. The patient was also on insulin pump I the past.she was checking checks BS 2-3 times a day and self-blood glucose monitoring has been highly variable prior to admission. She is due for annual eye exam but denied any history of diabetic retinopathy.   The patient performs her own feet care and doesn't see podiatry service  Microvascular complications:  No Retinopathy, Nephropathy + Neuropathy   Macrovascular complications: no CAD, PVD, or Stroke    Lab Results   Component Value Date    LABA1C 11.2 10/18/2020     Inpatient diet:   Carb Restricted diet     Point of care glucose monitoring (Independently reviewed)   Recent Labs     12/15/20  0739 12/15/20  0938 12/15/20  1137 12/15/20  1239 12/15/20  1401 12/15/20  1508 12/15/20  1616 12/15/20  1725   GLUMET 422* 350* 200* 118* 120* 196* 181* 127*       Past medical history:   Past Medical History:   Diagnosis Date    Bleeding at insertion site 1/5/2018    Common femoral artery injury, right, initial encounter 1/5/2018    Depressive disorder     DM type 1 (diabetes mellitus, type 1) ([facility] [address]        Past surgical history:  Past Surgical History:   Procedure Laterality Date    ABDOMEN SURGERY N/A 5/9/2019    INCISION AND DRAINAGE OF SUPRAPUBIC ABSESS performed by Miri Fisher MD at [address]      last yr       Social history:   Tobacco:   reports that she has never smoked. She has never used smokeless tobacco.  Alcohol:   reports no history of alcohol use. Drugs:   reports no history of drug use. Family history:    Family History   Problem Relation Age of Onset    Diabetes Maternal Grandmother     Diabetes Paternal Grandmother     Stroke Other     Thyroid Disease Neg Hx        Allergy and drug reactions:   No Known Allergies    Scheduled Meds:   insulin glargine  25 Units Subcutaneous Daily    insulin lispro  0-12 Units Subcutaneous TID WC    insulin lispro  0-6 Units Subcutaneous Nightly    metoclopramide  10 mg Intravenous Q6H     PRN Meds:       dextrose, 12.5 g, PRN      potassium chloride, 10 mEq, PRN      magnesium sulfate, 1 g, PRN      sodium phosphate IVPB, 10 mmol, PRN    Or      sodium phosphate IVPB, 15 mmol, PRN    Or      sodium phosphate IVPB, 20 mmol, PRN      dextrose 5 % and 0.45 % NaCl, , Continuous PRN      fentanNYL, 50 mcg, Q2H PRN      ondansetron, 4 mg, Q6H PRN      Continuous Infusions:   dextrose 5 % and 0.45 % NaCl Stopped (12/15/20 1800)       Review of Systems  All systems reviewed.  All negative except for symptoms mentioned in HPI     OBJECTIVE    /76   Pulse 109   Temp 97.6 °F (36.4 °C) (Oral)   Resp 18   Ht 5' 1\" (1.549 m)   Wt 95 lb (43.1 kg)   SpO2 100%   BMI 0.45 (H) 0.02 - 0.27 mmol/L Final     Lab Results   Component Value Date    LABA1C 11.2 10/18/2020    LABA1C 13.0 05/30/2020    LABA1C 15.9 03/09/2020     No results found for: TSH, T4FREE, Y9MKVTB, FT3, O1QOPUT, TSI, TPOABS, THGAB  Lab Results   Component Value Date    LABA1C 11.2 10/18/2020    GLUCOSE 127 12/15/2020    MALBCR 204.6 01/23/2018    LABMICR 102.3 01/23/2018    LABCREA 12 10/18/2020     Lab Results   Component Value Date    TRIG 106 01/24/2018    HDL 40 01/24/2018    LDLCALC 114 01/24/2018    CHOL 175 01/24/2018       Blood culture   Lab Results   Component Value Date    BC 5 Days no growth 11/10/2020    BC 5 Days no growth 11/01/2020       Radiology:  XR ABDOMEN (KUB) (SINGLE AP VIEW)   Final Result   Nonspecific, nonobstructive abdomen. Grossly the stomach does not appear   abnormally distended. Is      XR ABDOMEN FOR NG/OG/NE TUBE PLACEMENT   Final Result   Tip of NG tube below the diaphragm. CT ABDOMEN PELVIS W IV CONTRAST Additional Contrast? None   Final Result   Distended and fluid-filled stomach and proximal duodenum, with decompressed   small bowel distal to the transverse segment of the duodenum. In addition   the mesenteries angle appears to be reduced. The appearance raises concern   for SMA syndrome. Critical results were called by Dr. Jana Strickland to [facility] on   12/14/2020 at 18:19.           Medical Records/Labs/Images review:   I personally reviewed and summarized previous records   All labs and imaging were reviewed independently     Bob Espinoza, a 23 y.o.-old female seen today for inpatient diabetes management     Brittle type 1 DM admitted with DKA   · Patient's DM is very brittle and poorly controlled  due to poor compliance with diet and BS checking   · Received Lantus 25 units this AM   · We recommend the following DM regimen   · Change Lantus 14 units every morning   · Humalog 4 units with meals   · Low dose sliding scale     · Glucose check before meals and at bed time   · Will titrate insulin dose based on blood glucose trend & insulin requirement    Abdominal pain/n/v  · No clear etiology. Previous work up showed normal Gastric empty study and HIDA scan   · GI on board     Previous COVID    · Recent test negative     The above issues were reviewed with the patient who understood and agreed with the plan. Thank you for allowing us to participate in the care of this patient. Please do not hesitate to contact us with any additional questions. Eric Valenzuela MD  Endocrinologist, [facility] Endocrinology   [address] [place] [id number]   Phone: [phone]  Fax: [phone]  --------------------------------------------  An electronic signature was used to authenticate this note.  Elizabeth Hammond MD on 12/15/2020 at 8:46 PM

## 2020-12-16 NOTE — CARE COORDINATION
Met with patient about diagnosis and discharge plan of care. Pt has all diabetic supplies and does not have problems with obtaining her insulin. PCP is Dr Brenda Ngo and endocrinologist is Dr Rigo Lopez.  Will follow with any needs-MJO

## 2020-12-16 NOTE — PROGRESS NOTES
[facility] Quality Flow/Interdisciplinary Rounds Progress Note        Quality Flow Rounds held on December 16, 2020    Disciplines Attending:  Bedside Nurse, ,  and Nursing Unit Leadership    Carey Blair was admitted on 12/14/2020  3:34 PM    Anticipated Discharge Date:  Expected Discharge Date: 12/17/20    Disposition:    Gavino Score:  Gavino Scale Score: 22    Readmission Risk              Risk of Unplanned Readmission:        41           Discussed patient goal for the day, patient clinical progression, and barriers to discharge.   The following Goal(s) of the Day/Commitment(s) have been identified:  Discharge [address] [place]  December 16, 2020

## 2020-12-16 NOTE — PROGRESS NOTES
PROGRESS NOTE    Patient Presents with/Seen in Consultation For      *Reason for Consult: Abdominal pain, DKA, possible SMA syndrome  CHIEF COMPLAINT:  Abdominal pain    Subjective:     Patient seen Michelet Mosqueda in bed in no apparent distress. Pt states she feels much better. Pt denies nausea, stating she has mild diffuse abdominal discomfort after eating. Pt with no BM. Pt complains of lower back pain 2/10. Patient denies current abdominal pain, nausea, melena, hematochezia or hematemesis. Plan of care discussed with patient, patient agrees. Review of Systems  Aside from what was mentioned in the PMH and HPI, essentially unremarkable, all others negative. Objective:     BP (!) 142/103   Pulse 105   Temp 97.4 °F (36.3 °C) (Oral)   Resp 17   Ht 5' 1\" (1.549 m)   Wt 95 lb (43.1 kg)   SpO2 100%   BMI 17.95 kg/m²     General appearance: alert, awake, laying in bed, and cooperative  Eyes: conjunctiva normal, sclera anicteric. PERRL.   Lungs: clear to auscultation bilaterally  Heart: regular rate and rhythm, no murmur, 2+ pulses; no edema  Abdomen: soft, non-tender; bowel sounds normal; no masses,  no organomegaly  Extremities: extremities without edema  Pulses: 2+ and symmetric  Skin: Skin color, texture, turgor normal.   Neurologic: Grossly normal        HYDROcodone-acetaminophen (NORCO) 5-325 MG per tablet 1 tablet, Q6H PRN      insulin lispro (HUMALOG) injection vial 4 Units, TID WC      insulin lispro (HUMALOG) injection vial 0-6 Units, TID WC      insulin lispro (HUMALOG) injection vial 0-3 Units, Nightly      glucose (GLUTOSE) 40 % oral gel 15 g, PRN      dextrose 50 % IV solution, PRN      glucagon (rDNA) injection 1 mg, PRN      dextrose 5 % solution, PRN      insulin glargine (LANTUS) injection vial 14 Units, QAM      dextrose 50 % IV solution, PRN      potassium chloride 10 mEq/100 mL IVPB (Peripheral Line), PRN      magnesium sulfate 1 g in dextrose 5% 100 mL IVPB, PRN      sodium phosphate 10 mmol in dextrose 5 % 250 mL IVPB, PRN    Or      sodium phosphate 15 mmol in dextrose 5 % 250 mL IVPB, PRN    Or      sodium phosphate 20 mmol in dextrose 5 % 500 mL IVPB, PRN      dextrose 5 % and 0.45 % sodium chloride infusion, Continuous PRN      metoclopramide (REGLAN) injection 10 mg, Q6H      ondansetron (ZOFRAN) injection 4 mg, Q6H PRN         Data Review  CBC:   Lab Results   Component Value Date    WBC 5.4 12/14/2020    RBC 4.65 12/14/2020    HGB 11.6 12/14/2020    HCT 37.4 12/14/2020    MCV 80.4 12/14/2020    MCH 24.9 12/14/2020    MCHC 31.0 12/14/2020    RDW 14.3 12/14/2020     12/14/2020    MPV 11.2 12/14/2020     CMP:    Lab Results   Component Value Date     12/16/2020    K 3.9 12/16/2020    K 5.3 12/14/2020     12/16/2020    CO2 22 12/16/2020    BUN 11 12/16/2020    CREATININE 0.6 12/16/2020    GFRAA >60 12/16/2020    LABGLOM >60 12/16/2020    GLUCOSE 132 12/16/2020    PROT 8.2 12/14/2020    LABALBU 4.2 12/14/2020    CALCIUM 9.3 12/16/2020    BILITOT 0.3 12/14/2020    ALKPHOS 105 12/14/2020    AST 34 12/14/2020    ALT 33 12/14/2020     Hepatic Function Panel:    Lab Results   Component Value Date    ALKPHOS 105 12/14/2020    ALT 33 12/14/2020    AST 34 12/14/2020    PROT 8.2 12/14/2020    BILITOT 0.3 12/14/2020    BILIDIR <0.2 12/14/2020    IBILI see below 12/14/2020    LABALBU 4.2 12/14/2020     No components found for: CHLPL    Lab Results   Component Value Date    TRIG 106 01/24/2018       Lab Results   Component Value Date    HDL 40 01/24/2018       Lab Results   Component Value Date    LDLCALC 114 (H) 01/24/2018       Lab Results   Component Value Date    LABVLDL 21 01/24/2018      PT/INR:    Lab Results   Component Value Date    PROTIME 10.7 12/14/2020    INR 0.9 12/14/2020     IRON:    Lab Results   Component Value Date    IRON 38 10/19/2020     Iron Saturation:  No components found for: PERCENTFE  FERRITIN:    Lab Results   Component Value Date    FERRITIN 59 10/18/2020         Assessment:   Active Problems:  ? Abdominal pain, diffuse - resolved   ? Nausea- resolved  ? DKA- defer to endocrinology  ? Hyponatremia  ? Anemia, normocytic, normochromic  ? Gastritis  ? Diabetes type 1, uncontrolled    Plan:     ? Dulcolax as ordered  ? Colace as ordered  ? Recent HIDA and gastric empty study negative  ? IV fluids as per endocrine/PCP  ? Diet per PCP  ? Monitor CMP and CBC daily  ? Medicate for pain and nausea per PCP  ? Defer comorbidities to others  ? Pt scheduled for colon OP 1/2021, will need EGD same time done as OP, pt to call office to schedule  ? OK to discharge from GI POV when DKA resolves and OK with others      Note: This report was completed utilizing computer voice recognition software. Every effort has been made to ensure accuracy, however; inadvertent computerized transcription errors may be present. Discussed with Dr. Minerva York per Dr. Karine MCKEON-NP-C 12/16/2020  11:04 AM For Dr. Elaine Gray. I HAD A FACE TO FACE ENCOUNTER WITH THE PATIENT. AGREE WITH THE EXAM, ASSESSMENT, AND PLAN AS OUTLINED ABOVE. ADDITION AND CORRECTIONS WERE DONE AS DEEMED APPROPRIATE. MY EXAM AND PLAN INCLUDE:    PATIENT DENIED ABDOMINAL PAIN TWICE. HOWEVER ASKED FOR PAIN MEDICINE INCASE PAIN CAME BACK. WHEN HER REQUEST WAS DENIED, SHE STARTED TO COMPLAIN OF ABDOMINAL PAIN AGAIN TO PCP. NP BACK TO SEE PATIENT, AGAIN STATING NO PAIN. TOLERATED PO WELL. HAS OUT PATIENT COLON SCHEDULED FOR JANUARY WITH MY OFFICE. CAN DISCHARGE AFTER DKA RESOLVES AND WILL FOLLOW AS OUTPATIENT.      Ritesh Looney M.D 12/16/2020 2:29 PM

## 2020-12-16 NOTE — PROGRESS NOTES
 dextrose 5 % and 0.45 % NaCl Stopped (12/15/20 1800)       Review of Systems  All systems reviewed. All negative except for symptoms mentioned in HPI     OBJECTIVE    /72   Pulse 107   Temp 97.8 °F (36.6 °C) (Oral)   Resp 17   Ht 5' 1\" (1.549 m)   Wt 95 lb (43.1 kg)   SpO2 100%   BMI 17.95 kg/m²   No intake or output data in the 24 hours ending 12/16/20 0748    Physical examination:  General: awake alert, oriented x3, no abnormal position or movements. HEENT: normocephalic non traumatic, no exophthalmos   Neck: supple, no LN enlargement, no thyromegaly, no thyroid tenderness, no JVD. Pulm: Clear equal air entry no added sounds, no wheezing or rhonchi    CVS: S1 + S2, no murmur, no heave. Dorsalis pedis pulse palpable   Abd: soft lax, mild tenderness, no organomegaly, audible bowel sounds. Skin: warm, no lesions, no rash. no Ulcers, no open wounds   Neuro: CN intact, muscle power normal  Psych: normal mood, and affect    Review of Laboratory Data:  I personally reviewed the following labs:   Recent Labs     12/14/20  1550   WBC 5.4   RBC 4.65   HGB 11.6   HCT 37.4   MCV 80.4   MCH 24.9*   MCHC 31.0*   RDW 14.3      MPV 11.2     Recent Labs     12/14/20  1550 12/14/20  1550 12/15/20  2250 12/16/20  0240 12/16/20  0704   *   < > 132 134 137   K 5.3*   < > 4.1 4.3 3.9   CL 85*   < > 101 102 106   CO2 13*   < > 20* 19* 22   BUN 24*   < > 9 12 11   CREATININE 0.6   < > 0.6 0.7 0.6   GLUCOSE 701*   < > 135* 216* 132*   CALCIUM 9.9   < > 8.9 8.8 9.3   PROT 8.2  --   --   --   --    LABALBU 4.2  --   --   --   --    BILITOT 0.3  --   --   --   --    ALKPHOS 105*  --   --   --   --    AST 34*  --   --   --   --    ALT 33*  --   --   --   --     < > = values in this interval not displayed.      Beta-Hydroxybutyrate   Date Value Ref Range Status   12/14/2020 >4.50 (H) 0.02 - 0.27 mmol/L Final   11/10/2020 2.21 (H) 0.02 - 0.27 mmol/L Final   11/01/2020 0.45 (H) 0.02 - 0.27 mmol/L Final     Lab Results   Component Value Date    LABA1C 11.2 10/18/2020    LABA1C 13.0 05/30/2020    LABA1C 15.9 03/09/2020     No results found for: TSH, T4FREE, I0PMZIM, FT3, O3UBLDH, TSI, TPOABS, THGAB  Lab Results   Component Value Date    LABA1C 11.2 10/18/2020    GLUCOSE 132 12/16/2020    MALBCR 204.6 01/23/2018    LABMICR 102.3 01/23/2018    LABCREA 12 10/18/2020     Lab Results   Component Value Date    TRIG 106 01/24/2018    HDL 40 01/24/2018    LDLCALC 114 01/24/2018    CHOL 175 01/24/2018       Blood culture   Lab Results   Component Value Date    BC 5 Days no growth 11/10/2020    BC 5 Days no growth 11/01/2020       Radiology:  XR ABDOMEN (KUB) (SINGLE AP VIEW)   Final Result   Nonspecific, nonobstructive abdomen. Grossly the stomach does not appear   abnormally distended. Is      XR ABDOMEN FOR NG/OG/NE TUBE PLACEMENT   Final Result   Tip of NG tube below the diaphragm. CT ABDOMEN PELVIS W IV CONTRAST Additional Contrast? None   Final Result   Distended and fluid-filled stomach and proximal duodenum, with decompressed   small bowel distal to the transverse segment of the duodenum. In addition   the mesenteries angle appears to be reduced. The appearance raises concern   for SMA syndrome. Critical results were called by Dr. Audie Green to [facility] on   12/14/2020 at 18:19.           Medical Records/Labs/Images review:   I personally reviewed and summarized previous records   All labs and imaging were reviewed independently     Bob Espinoza, a 23 y.o.-old female seen today for inpatient diabetes management     Brittle type 1 DM admitted with DKA   · Patient's DM is very brittle and poorly controlled  due to poor compliance with diet and BS checking   · Received Lantus 25 units this AM   · We recommend the following DM regimen   · Change Lantus 14 units every morning   · Humalog 3 units with meals   · Low dose sliding scale     · Glucose check before meals and at bed time   · Will titrate insulin dose based on blood glucose trend & insulin requirement  · Pt will follow with us after discharge. Endocrine follow up visit, Tuesday 1/12 at 2:00PM     Abdominal pain/n/v  · No clear etiology. Previous work up showed normal Gastric empty study and HIDA scan   · GI on board     Previous COVID    · Recent test negative     The above issues were reviewed with the patient who understood and agreed with the plan. Thank you for allowing us to participate in the care of this patient. Please do not hesitate to contact us with any additional questions. Grupo Garza MD  Endocrinologist, [facility] Diabetes [facility] and Endocrinology   [address] [facility] [address]   Phone: [phone]  Fax: [phone]  --------------------------------------------  An electronic signature was used to authenticate this note.  Essie Barnes MD on 12/16/2020 at 7:48 AM

## 2020-12-17 VITALS
HEART RATE: 104 BPM | SYSTOLIC BLOOD PRESSURE: 128 MMHG | OXYGEN SATURATION: 96 % | WEIGHT: 95 LBS | RESPIRATION RATE: 16 BRPM | HEIGHT: 61 IN | DIASTOLIC BLOOD PRESSURE: 88 MMHG | BODY MASS INDEX: 17.94 KG/M2 | TEMPERATURE: 98.4 F

## 2020-12-17 LAB
ANION GAP SERPL CALCULATED.3IONS-SCNC: 10 MMOL/L (ref 7–16)
BUN BLDV-MCNC: 11 MG/DL (ref 6–20)
CALCIUM SERPL-MCNC: 9.3 MG/DL (ref 8.6–10.2)
CHLORIDE BLD-SCNC: 101 MMOL/L (ref 98–107)
CO2: 26 MMOL/L (ref 22–29)
CREAT SERPL-MCNC: 0.6 MG/DL (ref 0.5–1)
GFR AFRICAN AMERICAN: >60
GFR NON-AFRICAN AMERICAN: >60 ML/MIN/1.73
GLUCOSE BLD-MCNC: 214 MG/DL (ref 74–99)
METER GLUCOSE: 135 MG/DL (ref 74–99)
METER GLUCOSE: 199 MG/DL (ref 74–99)
METER GLUCOSE: 205 MG/DL (ref 74–99)
METER GLUCOSE: 53 MG/DL (ref 74–99)
METER GLUCOSE: 90 MG/DL (ref 74–99)
POTASSIUM SERPL-SCNC: 4.1 MMOL/L (ref 3.5–5)
SODIUM BLD-SCNC: 137 MMOL/L (ref 132–146)

## 2020-12-17 PROCEDURE — 6370000000 HC RX 637 (ALT 250 FOR IP): Performed by: INTERNAL MEDICINE

## 2020-12-17 PROCEDURE — 82962 GLUCOSE BLOOD TEST: CPT

## 2020-12-17 PROCEDURE — 99239 HOSP IP/OBS DSCHRG MGMT >30: CPT | Performed by: INTERNAL MEDICINE

## 2020-12-17 PROCEDURE — 36415 COLL VENOUS BLD VENIPUNCTURE: CPT

## 2020-12-17 PROCEDURE — 6370000000 HC RX 637 (ALT 250 FOR IP): Performed by: NURSE PRACTITIONER

## 2020-12-17 PROCEDURE — 80048 BASIC METABOLIC PNL TOTAL CA: CPT

## 2020-12-17 PROCEDURE — 6360000002 HC RX W HCPCS: Performed by: INTERNAL MEDICINE

## 2020-12-17 RX ORDER — METOCLOPRAMIDE 10 MG/1
10 TABLET ORAL 4 TIMES DAILY PRN
Qty: 10 TABLET | Refills: 0 | Status: SHIPPED | OUTPATIENT
Start: 2020-12-17 | End: 2020-12-17 | Stop reason: HOSPADM

## 2020-12-17 RX ADMIN — INSULIN LISPRO 3 UNITS: 100 INJECTION, SOLUTION INTRAVENOUS; SUBCUTANEOUS at 16:50

## 2020-12-17 RX ADMIN — HYDROCODONE BITARTRATE AND ACETAMINOPHEN 1 TABLET: 5; 325 TABLET ORAL at 06:39

## 2020-12-17 RX ADMIN — HYDROCODONE BITARTRATE AND ACETAMINOPHEN 1 TABLET: 5; 325 TABLET ORAL at 12:52

## 2020-12-17 RX ADMIN — INSULIN LISPRO 2 UNITS: 100 INJECTION, SOLUTION INTRAVENOUS; SUBCUTANEOUS at 16:51

## 2020-12-17 RX ADMIN — HYDROCODONE BITARTRATE AND ACETAMINOPHEN 1 TABLET: 5; 325 TABLET ORAL at 16:52

## 2020-12-17 RX ADMIN — INSULIN LISPRO 3 UNITS: 100 INJECTION, SOLUTION INTRAVENOUS; SUBCUTANEOUS at 09:08

## 2020-12-17 RX ADMIN — HYDROCODONE BITARTRATE AND ACETAMINOPHEN 1 TABLET: 5; 325 TABLET ORAL at 02:13

## 2020-12-17 RX ADMIN — INSULIN LISPRO 3 UNITS: 100 INJECTION, SOLUTION INTRAVENOUS; SUBCUTANEOUS at 11:53

## 2020-12-17 RX ADMIN — INSULIN LISPRO 1 UNITS: 100 INJECTION, SOLUTION INTRAVENOUS; SUBCUTANEOUS at 09:11

## 2020-12-17 RX ADMIN — ORPHENADRINE CITRATE 60 MG: 30 INJECTION INTRAMUSCULAR; INTRAVENOUS at 09:23

## 2020-12-17 RX ADMIN — INSULIN GLARGINE 14 UNITS: 100 INJECTION, SOLUTION SUBCUTANEOUS at 09:08

## 2020-12-17 RX ADMIN — BISACODYL 10 MG: 5 TABLET, COATED ORAL at 09:22

## 2020-12-17 ASSESSMENT — PAIN DESCRIPTION - ORIENTATION
ORIENTATION: LOWER

## 2020-12-17 ASSESSMENT — PAIN DESCRIPTION - PAIN TYPE
TYPE: ACUTE PAIN

## 2020-12-17 ASSESSMENT — PAIN DESCRIPTION - PROGRESSION: CLINICAL_PROGRESSION: GRADUALLY WORSENING

## 2020-12-17 ASSESSMENT — PAIN SCALES - GENERAL
PAINLEVEL_OUTOF10: 8
PAINLEVEL_OUTOF10: 7
PAINLEVEL_OUTOF10: 8
PAINLEVEL_OUTOF10: 7
PAINLEVEL_OUTOF10: 0
PAINLEVEL_OUTOF10: 0

## 2020-12-17 ASSESSMENT — PAIN DESCRIPTION - DESCRIPTORS
DESCRIPTORS: STABBING;SHARP
DESCRIPTORS: STABBING;SHARP
DESCRIPTORS: SHARP;STABBING
DESCRIPTORS: STABBING;SHARP

## 2020-12-17 ASSESSMENT — PAIN DESCRIPTION - LOCATION
LOCATION: ABDOMEN;BACK
LOCATION: BACK

## 2020-12-17 ASSESSMENT — PAIN - FUNCTIONAL ASSESSMENT: PAIN_FUNCTIONAL_ASSESSMENT: ACTIVITIES ARE NOT PREVENTED

## 2020-12-17 ASSESSMENT — PAIN DESCRIPTION - ONSET
ONSET: ON-GOING
ONSET: ON-GOING

## 2020-12-17 ASSESSMENT — PAIN DESCRIPTION - FREQUENCY
FREQUENCY: CONTINUOUS
FREQUENCY: CONTINUOUS

## 2020-12-17 NOTE — PROGRESS NOTES
CLINICAL PHARMACY NOTE: MEDS TO [address] Select Patient?: No  Total # of Prescriptions Filled: 1   The following medications were delivered to the patient:  · MECLOPRAMIDE 10  Total # of Interventions Completed: 6  Time Spent (min): 45    Additional Documentation:

## 2020-12-17 NOTE — PROGRESS NOTES
reviewed discharge instructions with patient. She stated dr Ruddy Wong just left her room and said to continue insulin and scale as she was doing at home. Denies questions or concerns regarding insulin and blood sugar monitoring at home and follow up.   meds to beds delivered reglan but she has been refusing in hospital. Awaiting her mother to pick her up.  Stated she gets off work at [employer]

## 2020-12-17 NOTE — PLAN OF CARE
Problem: Pain:  Goal: Pain level will decrease  Description: Pain level will decrease  12/17/2020 1612 by Sarah Estes RN  Outcome: Completed     Problem: Pain:  Goal: Control of acute pain  Description: Control of acute pain  12/17/2020 1612 by Saarh Estes RN  Outcome: Completed     Problem: Pain:  Goal: Control of chronic pain  Description: Control of chronic pain  12/17/2020 1612 by Sarah Estes RN  Outcome: Completed     Problem: Metabolic:  Goal: Ability to maintain appropriate glucose levels will improve  Description: Ability to maintain appropriate glucose levels will improve  12/17/2020 1612 by Sarah Estes RN  Outcome: Completed     Problem: Nutritional:  Goal: Maintenance of adequate nutrition will improve  Description: Maintenance of adequate nutrition will improve  12/17/2020 1612 by Sarah Estes RN  Outcome: Completed     Problem: Physical Regulation:  Goal: Complications related to the disease process, condition or treatment will be avoided or minimized  Description: Complications related to the disease process, condition or treatment will be avoided or minimized  12/17/2020 1612 by Sarah Estes RN  Outcome: Completed     Problem: Physical Regulation:  Goal: Diagnostic test results will improve  Description: Diagnostic test results will improve  12/17/2020 1612 by Sarah Estes RN  Outcome: Completed

## 2020-12-17 NOTE — PATIENT CARE CONFERENCE
[facility] Quality Flow/Interdisciplinary Rounds Progress Note        Quality Flow Rounds held on December 17, 2020    Disciplines Attending:  Bedside Nurse, ,  and Nursing Unit Leadership    Deshanu Romero was admitted on 12/14/2020  3:34 PM    Anticipated Discharge Date:  Expected Discharge Date: 12/17/20    Disposition:    Gavino Score:  Gavino Scale Score: 20    Readmission Risk              Risk of Unplanned Readmission:        41           Discussed patient goal for the day, patient clinical progression, and barriers to discharge. The following Goal(s) of the Day/Commitment(s) have been identified:  Possible discharge today with OK from surgery JADEN Cornejo  December 17, 2020

## 2020-12-17 NOTE — PLAN OF CARE
Problem: Pain:  Goal: Pain level will decrease  Description: Pain level will decrease  Outcome: Met This Shift     Problem: Metabolic:  Goal: Ability to maintain appropriate glucose levels will improve  Description: Ability to maintain appropriate glucose levels will improve  Outcome: Met This Shift     Problem: Nutritional:  Goal: Maintenance of adequate nutrition will improve  Description: Maintenance of adequate nutrition will improve  Outcome: Met This Shift     Problem: Physical Regulation:  Goal: Complications related to the disease process, condition or treatment will be avoided or minimized  Description: Complications related to the disease process, condition or treatment will be avoided or minimized  Outcome: Met This Shift     Problem: Physical Regulation:  Goal: Diagnostic test results will improve  Description: Diagnostic test results will improve  Outcome: Met This Shift

## 2020-12-17 NOTE — DISCHARGE SUMMARY
[id number] [facility] EMERGENCY SERVICE Physician Discharge Summary       Martine Barrera MD  [address] [facility] [address]  [phone]    On 1/12/2021  Endocrine follow up visit, Tuesday 1/12 at 2:00PM    Soha Saha DO  [address] [place] [address], [address] [address]  [phone]          Activity level: As tolerated    Diet: Diabetic diet     Dispo: Home with self-care    Condition on discharge-stable    Patient ID:  Marquez Polanco  [id number]  23 y.o.  1995    Admit date: 12/14/2020    Discharge date and time:  12/17/2020  10:48 AM    Admission Diagnoses: Principal Problem:    DKA, type 1, not at goal [facility])  Active Problems:    Personal history of noncompliance with medical treatment, presenting hazards to health    Severe dehydration    Generalized abdominal pain    Nausea, vomiting and diarrhea  Resolved Problems:    * No resolved hospital problems. *      Discharge Diagnoses: Principal Problem:    DKA, type 1, not at goal [facility])  Active Problems:    Personal history of noncompliance with medical treatment, presenting hazards to health    Severe dehydration    Generalized abdominal pain    Nausea, vomiting and diarrhea  Resolved Problems:    * No resolved hospital problems. *      Consults:  IP CONSULT TO GI  IP CONSULT TO CRITICAL CARE  IP CONSULT TO GENERAL SURGERY  IP CONSULT TO ENDOCRINOLOGY      Hospital Course:  She is a 29-year-old female with PMH of DMI  who follows with Dr. Jame Vasquez , Dr Huy Breen. She takes Basaglar and Humalog,she  came to ER with DKA. As per available information she did not miss any dose. She has history of recent Covid. She came to ER with nausea/ vomiting/diarrhea and abdominal pain/distention. Work-up in ER with DKA type I. She was started on IV insulin drip as per protocol and admitted in ICU. Other supportive treatment was continued. Endocrinology was consulted. Repeat Covid on admission was negative. CT on admission with distended and fluid-filled stomach and proximal duodenum with decompressed small bowel distal to transverse segment of the duodenum. Mesenteric angle appears to be reduce and there was concern of SMA syndrome. CT findings suggestive of gastroparesis or SMA syndrome. She had NG tube placed in ER for short time. Surgery was consulted and following during the admission. She had follow-up KUB which showed nonspecific, nonobstructive abdomen. As per surgery she had recent gastric emptying study and HIDA which were negative and if her symptoms persist, gastric dilatation persist might need CorPak in the jejunum for nutritional support. GI was also following. Once her anion gap resolved-she was started on basal insulin as per home and  sliding scale. She is advised to keep blood sugar records and have close follow-up with Dr Pili Cassidy. As per GI she can be discharged and she will follow with them for outpatient for colonoscopy/EGD. As per surgery she can be discharged. On discharge she was tolerating diet without problems. Her symptoms has resolved. She is ambulating without problems. She   is discharged home in stable status with outpatient follow-up as outlined above.       Discharge Exam:  Vitals:    12/16/20 2354 12/17/20 0213 12/17/20 0347 12/17/20 0900   BP: 126/80  120/86 (!) 149/96   Pulse: 103  103 99   Resp: 16  16 16   Temp: 98.1 °F (36.7 °C)  98 °F (36.7 °C) 98 °F (36.7 °C)   TempSrc: Temporal  Temporal Oral   SpO2:  99%  96%   Weight:       Height:           General Appearance: alert and oriented to person, place and time, in no acute distress  Skin: warm and dry  Head: normocephalic and atraumatic  Eyes: pupils equal, round, and reactive to light, extraocular eye movements intact, conjunctivae normal  Neck: supple and non-tender without mass  Pulmonary/Chest: clear to auscultation bilaterally  Cardiovascular: normal rate, regular rhythm, normal S1 and S2  Abdomen: soft, non-tender, non-distended, normal bowel sounds, no masses or organomegaly  Extremities: no cyanosis, clubbing   Musculoskeletal: normal range of motion  Neurologic:no cranial nerve deficit,  speech normal        LABS:  Recent Labs     12/15/20  2250 12/16/20  0240 12/16/20  0704    134 137   K 4.1 4.3 3.9    102 106   CO2 20* 19* 22   BUN 9 12 11   CREATININE 0.6 0.7 0.6   GLUCOSE 135* 216* 132*   CALCIUM 8.9 8.8 9.3       Recent Labs     12/14/20  1550   WBC 5.4   RBC 4.65   HGB 11.6   HCT 37.4   MCV 80.4   MCH 24.9*   MCHC 31.0*   RDW 14.3      MPV 11.2       No results for input(s): POCGLU in the last 72 hours. Imaging:   XR ABDOMEN (KUB) (SINGLE AP VIEW)   Final Result   Nonspecific, nonobstructive abdomen. Grossly the stomach does not appear   abnormally distended. Is      XR ABDOMEN FOR NG/OG/NE TUBE PLACEMENT   Final Result   Tip of NG tube below the diaphragm. CT ABDOMEN PELVIS W IV CONTRAST Additional Contrast? None   Final Result   Distended and fluid-filled stomach and proximal duodenum, with decompressed   small bowel distal to the transverse segment of the duodenum. In addition   the mesenteries angle appears to be reduced. The appearance raises concern   for SMA syndrome. Critical results were called by Dr. Melisa Caballero to [facility] on   12/14/2020 at 18:19.           Patient Instructions:      Medication List      START taking these medications    metoclopramide 10 MG tablet  Commonly known as: REGLAN  Take 1 tablet by mouth 4 times daily as needed (nausea /vomiting)        CONTINUE taking these medications    Basaglar KwikPen 100 UNIT/ML injection pen  Generic drug: insulin glargine     NovoLOG 100 UNIT/ML injection vial  Generic drug: insulin aspart        STOP taking these medications    ibuprofen 200 MG tablet  Commonly known as: ADVIL;MOTRIN           Where to Get Your Medications      These medications were sent to [facility], [address], [address] [place] [address]    Phone: [phone]   · metoclopramide 10 MG tablet       SYLVIE Aguirre Reglan was discontinued since she did not need this in last 24 hrs. .Bedside nurse was updated to return this to pharmacy    Note that more  than 30 minutes was spent in preparing discharge papers, discussing discharge with patient, medication review, etc.    Signed:  Electronically signed by Carrie Boyce MD on 12/17/2020 at 10:48 AM    NOTE: This report was transcribed using voice recognition software. Every effort was made to ensure accuracy; however, inadvertent computerized transcription errors may be present.

## 2020-12-18 ENCOUNTER — APPOINTMENT (OUTPATIENT)
Dept: CT IMAGING | Age: 25
End: 2020-12-18
Payer: COMMERCIAL

## 2020-12-18 ENCOUNTER — HOSPITAL ENCOUNTER (EMERGENCY)
Age: 25
Discharge: HOME | End: 2020-12-18
Attending: EMERGENCY MEDICINE
Payer: COMMERCIAL

## 2020-12-18 VITALS
OXYGEN SATURATION: 99 % | TEMPERATURE: 97.8 F | HEART RATE: 92 BPM | BODY MASS INDEX: 17.94 KG/M2 | SYSTOLIC BLOOD PRESSURE: 138 MMHG | WEIGHT: 95 LBS | RESPIRATION RATE: 14 BRPM | HEIGHT: 61 IN | DIASTOLIC BLOOD PRESSURE: 62 MMHG

## 2020-12-18 LAB
ALBUMIN SERPL-MCNC: 3.8 G/DL (ref 3.5–5.2)
ALP BLD-CCNC: 78 U/L (ref 35–104)
ALT SERPL-CCNC: 35 U/L (ref 0–32)
ANION GAP SERPL CALCULATED.3IONS-SCNC: 14 MMOL/L (ref 7–16)
AST SERPL-CCNC: 43 U/L (ref 0–31)
BASOPHILS ABSOLUTE: 0.02 E9/L (ref 0–0.2)
BASOPHILS RELATIVE PERCENT: 0.4 % (ref 0–2)
BILIRUB SERPL-MCNC: <0.2 MG/DL (ref 0–1.2)
BILIRUBIN URINE: NEGATIVE
BLOOD, URINE: NEGATIVE
BUN BLDV-MCNC: 24 MG/DL (ref 6–20)
CALCIUM SERPL-MCNC: 9.5 MG/DL (ref 8.6–10.2)
CHLORIDE BLD-SCNC: 96 MMOL/L (ref 98–107)
CHP ED QC CHECK: NORMAL
CLARITY: CLEAR
CO2: 24 MMOL/L (ref 22–29)
COLOR: YELLOW
CREAT SERPL-MCNC: 0.6 MG/DL (ref 0.5–1)
EOSINOPHILS ABSOLUTE: 0.02 E9/L (ref 0.05–0.5)
EOSINOPHILS RELATIVE PERCENT: 0.4 % (ref 0–6)
GFR AFRICAN AMERICAN: >60
GFR NON-AFRICAN AMERICAN: >60 ML/MIN/1.73
GLUCOSE BLD-MCNC: 371 MG/DL
GLUCOSE BLD-MCNC: 395 MG/DL (ref 74–99)
GLUCOSE URINE: >=1000 MG/DL
HCG, URINE, POC: NEGATIVE
HCT VFR BLD CALC: 31.8 % (ref 34–48)
HEMOGLOBIN: 10.3 G/DL (ref 11.5–15.5)
IMMATURE GRANULOCYTES #: 0.01 E9/L
IMMATURE GRANULOCYTES %: 0.2 % (ref 0–5)
KETONES, URINE: ABNORMAL MG/DL
LEUKOCYTE ESTERASE, URINE: NEGATIVE
LYMPHOCYTES ABSOLUTE: 2.1 E9/L (ref 1.5–4)
LYMPHOCYTES RELATIVE PERCENT: 44.8 % (ref 20–42)
Lab: NORMAL
MCH RBC QN AUTO: 25.4 PG (ref 26–35)
MCHC RBC AUTO-ENTMCNC: 32.4 % (ref 32–34.5)
MCV RBC AUTO: 78.3 FL (ref 80–99.9)
METER GLUCOSE: 371 MG/DL (ref 74–99)
MONOCYTES ABSOLUTE: 0.27 E9/L (ref 0.1–0.95)
MONOCYTES RELATIVE PERCENT: 5.8 % (ref 2–12)
NEGATIVE QC PASS/FAIL: NORMAL
NEUTROPHILS ABSOLUTE: 2.27 E9/L (ref 1.8–7.3)
NEUTROPHILS RELATIVE PERCENT: 48.4 % (ref 43–80)
NITRITE, URINE: NEGATIVE
PDW BLD-RTO: 14.6 FL (ref 11.5–15)
PH UA: 5.5 (ref 5–9)
PLATELET # BLD: 254 E9/L (ref 130–450)
PMV BLD AUTO: 11.2 FL (ref 7–12)
POSITIVE QC PASS/FAIL: NORMAL
POTASSIUM REFLEX MAGNESIUM: 4 MMOL/L (ref 3.5–5)
PROTEIN UA: NEGATIVE MG/DL
RBC # BLD: 4.06 E12/L (ref 3.5–5.5)
SODIUM BLD-SCNC: 134 MMOL/L (ref 132–146)
SPECIFIC GRAVITY UA: 1.01 (ref 1–1.03)
TOTAL PROTEIN: 7 G/DL (ref 6.4–8.3)
URINE CULTURE, ROUTINE: NORMAL
UROBILINOGEN, URINE: 0.2 E.U./DL
WBC # BLD: 4.7 E9/L (ref 4.5–11.5)

## 2020-12-18 PROCEDURE — 85025 COMPLETE CBC W/AUTO DIFF WBC: CPT

## 2020-12-18 PROCEDURE — 99283 EMERGENCY DEPT VISIT LOW MDM: CPT

## 2020-12-18 PROCEDURE — 82962 GLUCOSE BLOOD TEST: CPT

## 2020-12-18 PROCEDURE — 96374 THER/PROPH/DIAG INJ IV PUSH: CPT

## 2020-12-18 PROCEDURE — 6360000002 HC RX W HCPCS: Performed by: EMERGENCY MEDICINE

## 2020-12-18 PROCEDURE — 70450 CT HEAD/BRAIN W/O DYE: CPT

## 2020-12-18 PROCEDURE — 81003 URINALYSIS AUTO W/O SCOPE: CPT

## 2020-12-18 PROCEDURE — 74176 CT ABD & PELVIS W/O CONTRAST: CPT

## 2020-12-18 PROCEDURE — 2580000003 HC RX 258: Performed by: EMERGENCY MEDICINE

## 2020-12-18 PROCEDURE — 96372 THER/PROPH/DIAG INJ SC/IM: CPT

## 2020-12-18 PROCEDURE — 80053 COMPREHEN METABOLIC PANEL: CPT

## 2020-12-18 PROCEDURE — 93005 ELECTROCARDIOGRAM TRACING: CPT | Performed by: NURSE PRACTITIONER

## 2020-12-18 PROCEDURE — 36415 COLL VENOUS BLD VENIPUNCTURE: CPT

## 2020-12-18 RX ORDER — ORPHENADRINE CITRATE 30 MG/ML
60 INJECTION INTRAMUSCULAR; INTRAVENOUS ONCE
Status: COMPLETED | OUTPATIENT
Start: 2020-12-18 | End: 2020-12-18

## 2020-12-18 RX ORDER — POLYETHYLENE GLYCOL 3350 17 G/17G
POWDER, FOR SOLUTION ORAL
Qty: 1 BOTTLE | Refills: 0 | Status: SHIPPED | OUTPATIENT
Start: 2020-12-18 | End: 2021-01-01

## 2020-12-18 RX ORDER — DOCUSATE SODIUM 100 MG/1
100 CAPSULE, LIQUID FILLED ORAL 2 TIMES DAILY
Qty: 20 CAPSULE | Refills: 0 | Status: SHIPPED | OUTPATIENT
Start: 2020-12-18 | End: 2020-12-28

## 2020-12-18 RX ORDER — NAPROXEN 250 MG/1
250 TABLET ORAL 2 TIMES DAILY WITH MEALS
Qty: 28 TABLET | Refills: 0 | Status: SHIPPED | OUTPATIENT
Start: 2020-12-18 | End: 2021-01-11

## 2020-12-18 RX ORDER — KETOROLAC TROMETHAMINE 30 MG/ML
30 INJECTION, SOLUTION INTRAMUSCULAR; INTRAVENOUS ONCE
Status: COMPLETED | OUTPATIENT
Start: 2020-12-18 | End: 2020-12-18

## 2020-12-18 RX ORDER — FENTANYL CITRATE 50 UG/ML
50 INJECTION, SOLUTION INTRAMUSCULAR; INTRAVENOUS ONCE
Status: DISCONTINUED | OUTPATIENT
Start: 2020-12-18 | End: 2020-12-18

## 2020-12-18 RX ORDER — ORPHENADRINE CITRATE 100 MG/1
100 TABLET, EXTENDED RELEASE ORAL 2 TIMES DAILY
Qty: 20 TABLET | Refills: 0 | Status: SHIPPED | OUTPATIENT
Start: 2020-12-18 | End: 2020-12-28

## 2020-12-18 RX ORDER — 0.9 % SODIUM CHLORIDE 0.9 %
1000 INTRAVENOUS SOLUTION INTRAVENOUS ONCE
Status: COMPLETED | OUTPATIENT
Start: 2020-12-18 | End: 2020-12-18

## 2020-12-18 RX ADMIN — SODIUM CHLORIDE 1000 ML: 9 INJECTION, SOLUTION INTRAVENOUS at 18:05

## 2020-12-18 RX ADMIN — KETOROLAC TROMETHAMINE 30 MG: 30 INJECTION, SOLUTION INTRAMUSCULAR at 20:20

## 2020-12-18 RX ADMIN — ORPHENADRINE CITRATE 60 MG: 60 INJECTION INTRAMUSCULAR; INTRAVENOUS at 20:19

## 2020-12-18 ASSESSMENT — PAIN DESCRIPTION - PAIN TYPE: TYPE: ACUTE PAIN

## 2020-12-18 ASSESSMENT — PAIN DESCRIPTION - LOCATION: LOCATION: BACK

## 2020-12-18 ASSESSMENT — PAIN SCALES - GENERAL
PAINLEVEL_OUTOF10: 9
PAINLEVEL_OUTOF10: 9

## 2020-12-18 ASSESSMENT — PAIN DESCRIPTION - ORIENTATION: ORIENTATION: LOWER

## 2020-12-18 NOTE — ED NOTES
FIRST PROVIDER CONTACT ASSESSMENT NOTE      Department of Emergency Medicine   12/18/20  4:13 PM EST    Chief Complaint: Back Pain and Blurred Vision (pt drove self here)      History of Present Illness:   Arielle Moreno is a 23 y.o. female who presents to the ED for back pain and blurred vision. Patient states that she received a Norflex injection yesterday prior to being discharged since that time she began to have blurred vision and worsening back pain. States that only the narcotics she was receiving inpatient or helping her back pain    Medical History:  has a past medical history of Bleeding at insertion site, Common femoral artery injury, right, initial encounter, Depressive disorder, and DM type 1 (diabetes mellitus, type 1) ([facility] [place] [address] Surgical History:  has a past surgical history that includes Abdomen surgery (N/A, 5/9/2019) and Bartholin gland cyst excision. Social History:  reports that she has never smoked. She has never used smokeless tobacco. She reports that she does not drink alcohol or use drugs. Family History: family history includes Diabetes in her maternal grandmother and paternal grandmother; Stroke in an other family member. *ALLERGIES*     Patient has no known allergies. Physical Exam:      VS:  There were no vitals taken for this visit.      Initial Plan of Care:  Initiate Treatment-Testing, Proceed toTreatment Area When Bed Available for ED Attending/MLP to Continue Care    -----------------END OF FIRST PROVIDER CONTACT ASSESSMENT NOTE--------------  Electronically signed by MIKE Moyer CNP   DD: 12/18/20             MIKE Moyer CNP  12/18/20 7776

## 2020-12-18 NOTE — ED NOTES
Bed: HALL-09  Expected date:   Expected time:   Means of arrival:   Comments:  Mary Ponce.  Amos Claudio RN  12/18/20 2786

## 2020-12-19 LAB
EKG ATRIAL RATE: 110 BPM
EKG P AXIS: 74 DEGREES
EKG P-R INTERVAL: 142 MS
EKG Q-T INTERVAL: 326 MS
EKG QRS DURATION: 62 MS
EKG QTC CALCULATION (BAZETT): 441 MS
EKG R AXIS: 71 DEGREES
EKG T AXIS: 57 DEGREES
EKG VENTRICULAR RATE: 110 BPM

## 2020-12-19 PROCEDURE — 93010 ELECTROCARDIOGRAM REPORT: CPT | Performed by: INTERNAL MEDICINE

## 2020-12-21 ASSESSMENT — ENCOUNTER SYMPTOMS
NAUSEA: 0
ABDOMINAL PAIN: 0
SHORTNESS OF BREATH: 0
VOMITING: 0
BACK PAIN: 1
EYE REDNESS: 0

## 2020-12-21 NOTE — ED PROVIDER NOTES
Chief complaint: Back pain and blurred vision      HPI:  12/21/20, Time: 8:49 AM EST    HPI               Hortensia Ayala is a 23 y.o. female presenting to the ED for back pain and blurred vision. The history is obtained from the patient as well as the patient's medical record. The patient states that she began approximately 1 week ago with back pain. Of note the patient was recently admitted for DKA. The patient reports that while she was in the hospital she was getting anti-inflammatory as well as a muscle relaxant which did help her pain. The pain is located in the low lumbar spine bilaterally. The pain is nonradiating. The pain is currently rated 9 out of 10. Is described as aching. The pain has been constant since onset. It is worse with bending and twisting. There are are no alleviating factors currently aside from the anti-inflammatory muscle relaxant the patient was receiving while she is in the hospital.  The patient also does admit to blurred vision. She states that she feels she is having a difficult time focusing on things. The patient denies any numbness, weakness or paresthesias of the extremities. He denies any bowel or bladder incontinence, saddle anesthesias. She has no history of IV drug abuse. No recent epidural injections. ROS:   Review of Systems   Constitutional: Negative for chills and fatigue. HENT: Negative for congestion. Eyes: Positive for visual disturbance. Negative for redness. Respiratory: Negative for shortness of breath. Cardiovascular: Negative for chest pain. Gastrointestinal: Negative for abdominal pain, nausea and vomiting. Genitourinary: Negative for dysuria. Musculoskeletal: Positive for back pain. Skin: Negative for rash. Neurological: Negative for light-headedness. Psychiatric/Behavioral: Negative for confusion.    All other systems reviewed and are negative.      --------------------------------------------- PAST HISTORY ---------------------------------------------  Past Medical History:  has a past medical history of Bleeding at insertion site, Common femoral artery injury, right, initial encounter, Depressive disorder, and DM type 1 (diabetes mellitus, type 1) ([facility] [address] Past Surgical History:  has a past surgical history that includes Abdomen surgery (N/A, 5/9/2019) and Bartholin gland cyst excision. Social History:  reports that she has never smoked. She has never used smokeless tobacco. She reports that she does not drink alcohol or use drugs. Family History: family history includes Diabetes in her maternal grandmother and paternal grandmother; Stroke in an other family member. The patients home medications have been reviewed. Allergies: Patient has no known allergies. ---------------------------------------------------PHYSICAL EXAM--------------------------------------        Constitutional/General: Alert and oriented x3, well appearing, non toxic in NAD  Head: Normocephalic and atraumatic  Eyes: Pupils equal, round and reactive, extraocular movements intact bilaterally, visual fields intact in all 4 quadrants bilaterally  Mouth: Oropharynx clear, handling secretions, no trismus  Neck: Supple, full ROM,  Pulmonary: Lungs clear to auscultation bilaterally, no wheezes, rales, or rhonchi. Not in respiratory distress  Cardiovascular:  Regular rate. Regular rhythm. No murmurs  Chest: no chest wall tenderness  Abdomen: Soft. Non tender. Non distended. No rebound, guarding, or rigidity. No pulsatile masses appreciated. Musculoskeletal: Moves all extremities x 4. Warm and well perfused, no clubbing, cyanosis, or edema. Capillary refill <3 seconds, there is no midline cervical, thoracic or lumbar spine tenderness to palpation. There is mild tenderness to palpation in the bilateral lumbar paraspinal muscles. There is no overlying erythema, warmth to touch or cellulitic changes. Skin: warm and dry. No rashes. ALT 35 (H) 0 - 32 U/L    AST 43 (H) 0 - 31 U/L   Urinalysis, reflex to microscopic   Result Value Ref Range    Color, UA Yellow Straw/Yellow    Clarity, UA Clear Clear    Glucose, Ur >=1000 (A) Negative mg/dL    Bilirubin Urine Negative Negative    Ketones, Urine TRACE (A) Negative mg/dL    Specific Gravity, UA 1.010 1.005 - 1.030    Blood, Urine Negative Negative    pH, UA 5.5 5.0 - 9.0    Protein, UA Negative Negative mg/dL    Urobilinogen, Urine 0.2 <2.0 E.U./dL    Nitrite, Urine Negative Negative    Leukocyte Esterase, Urine Negative Negative   POCT Glucose   Result Value Ref Range    Glucose 371 mg/dL    QC OK? OK    POCT Glucose   Result Value Ref Range    Meter Glucose 371 (H) 74 - 99 mg/dL   POC Pregnancy Urine   Result Value Ref Range    HCG, Urine, POC Negative Negative    Lot Number [id number]     Positive QC Pass/Fail Pass     Negative QC Pass/Fail Pass    EKG 12 Lead   Result Value Ref Range    Ventricular Rate 110 BPM    Atrial Rate 110 BPM    P-R Interval 142 ms    QRS Duration 62 ms    Q-T Interval 326 ms    QTc Calculation (Bazett) 441 ms    P Axis 74 degrees    R Axis 71 degrees    T Axis 57 degrees       RADIOLOGY:  Interpreted by Radiologist.  CT ABDOMEN PELVIS WO CONTRAST   Final Result   Diffuse colonic fecal retention with significant stool burden. The remainder of the exam is unremarkable. CT Head WO Contrast   Final Result   No acute intracranial abnormality. EKG:  This EKG is signed and interpreted by me. Sinus tachycardia, rate 110, no ST segment elevation or depression, CT interval 142 MS, QRS 62 MS,  MS, stable compared to patient's prior EKG  Interpreted by me      ------------------------- NURSING NOTES AND VITALS REVIEWED ---------------------------   The nursing notes within the ED encounter and vital signs as below have been reviewed by myself.   /62   Pulse 92   Temp 97.8 °F (36.6 °C) (Tympanic)   Resp 14   Ht 5' 1\" (1.549 m)   Wt 95 lb (43.1 kg) SpO2 99%   BMI 17.95 kg/m²   Oxygen Saturation Interpretation: Normal    The patients available past medical records and past encounters were reviewed. ------------------------------ ED COURSE/MEDICAL DECISION MAKING----------------------  Medications   0.9 % sodium chloride bolus (0 mLs Intravenous Stopped 12/18/20 1916)   ketorolac (TORADOL) injection 30 mg (30 mg Intravenous Given 12/18/20 2020)   orphenadrine (NORFLEX) injection 60 mg (60 mg Intramuscular Given 12/18/20 2019)             Medical Decision Making:   I, Dr. Jonathan Mckenzie am the primary physician of record. Junior Noel is a 23 y.o. female who presents to the ED for back pain and blurred vision differential diagnosis includes but is not limited to electrolyte derangement, hypoglycemia, pyelonephritis, constipation the patient does have a past medical history of   has a past medical history of Bleeding at insertion site, Common femoral artery injury, right, initial encounter, Depressive disorder, and DM type 1 (diabetes mellitus, type 1) ([facility] [address] . The patient was given Toradol and Norflex and IV fluid. She did have improvement in her back pain. Labs and imaging obtained, reviewed. The patient did have CBC which was fairly unremarkable, CMP remarkable for hyperglycemia with blood glucose at 395, anion gap only 14, CO2 24, no evidence of DKA, CT abdomen pelvis did demonstrate constipation, CT head unremarkable. The patient will be discharged home on MiraLAX and Colace for constipation. Patient will follow-up outpatient. The patient has no red flag symptoms of cauda equina, no risk factors for epidural abscess or hematoma. Re-Evaluations/Consultations: The patient is in the bed no acute distress. The results of today were discussed. Patient does feel some improvement. Patient will be discharged.             This patient's ED course included: History, physical examination, reevaluation prior to disposition, labs, imaging, telemetry monitoring, EKG, IV fluid, IV medication      This patient has remained hemodynamically stable during their ED course. Counseling: The emergency provider has spoken with the patient and discussed todays results, in addition to providing specific details for the plan of care and counseling regarding the diagnosis and prognosis. Questions are answered at this time and they are agreeable with the plan.       --------------------------------- IMPRESSION AND DISPOSITION ---------------------------------    IMPRESSION  1. Acute bilateral low back pain without sciatica    2. Constipation, unspecified constipation type    3. Hyperglycemia        DISPOSITION  Disposition: Discharge to home  Patient condition is stable        NOTE: This report was transcribed using voice recognition software.  Every effort was made to ensure accuracy; however, inadvertent computerized transcription errors may be present         Nabil Simon DO  12/21/20 2197

## 2020-12-31 ENCOUNTER — TELEPHONE (OUTPATIENT)
Dept: ENDOCRINOLOGY | Age: 25
End: 2020-12-31

## 2020-12-31 RX ORDER — INSULIN GLARGINE 100 [IU]/ML
25 INJECTION, SOLUTION SUBCUTANEOUS NIGHTLY
Qty: 5 PEN | Refills: 5 | Status: ON HOLD | OUTPATIENT
Start: 2020-12-31 | End: 2021-01-14 | Stop reason: HOSPADM

## 2021-01-11 ENCOUNTER — HOSPITAL ENCOUNTER (INPATIENT)
Age: 26
LOS: 3 days | Discharge: HOME | DRG: 420 | End: 2021-01-14
Attending: EMERGENCY MEDICINE | Admitting: INTERNAL MEDICINE
Payer: COMMERCIAL

## 2021-01-11 DIAGNOSIS — E87.20 LACTIC ACIDOSIS: ICD-10-CM

## 2021-01-11 DIAGNOSIS — E10.10 DIABETIC KETOACIDOSIS WITHOUT COMA ASSOCIATED WITH TYPE 1 DIABETES MELLITUS (HCC): Primary | ICD-10-CM

## 2021-01-11 DIAGNOSIS — E87.20 METABOLIC ACIDOSIS: ICD-10-CM

## 2021-01-11 LAB
ALBUMIN SERPL-MCNC: 4 G/DL (ref 3.5–5.2)
ALP BLD-CCNC: 92 U/L (ref 35–104)
ALT SERPL-CCNC: 37 U/L (ref 0–32)
ANION GAP SERPL CALCULATED.3IONS-SCNC: 25 MMOL/L (ref 7–16)
AST SERPL-CCNC: 30 U/L (ref 0–31)
BASOPHILS ABSOLUTE: 0.03 E9/L (ref 0–0.2)
BASOPHILS RELATIVE PERCENT: 0.3 % (ref 0–2)
BETA-HYDROXYBUTYRATE: >4.5 MMOL/L (ref 0.02–0.27)
BILIRUB SERPL-MCNC: 0.3 MG/DL (ref 0–1.2)
BILIRUBIN URINE: ABNORMAL
BLOOD, URINE: NEGATIVE
BUN BLDV-MCNC: 27 MG/DL (ref 6–20)
CALCIUM SERPL-MCNC: 8.5 MG/DL (ref 8.6–10.2)
CHLORIDE BLD-SCNC: 100 MMOL/L (ref 98–107)
CLARITY: CLEAR
CO2: 7 MMOL/L (ref 22–29)
COLOR: YELLOW
CREAT SERPL-MCNC: 0.7 MG/DL (ref 0.5–1)
EOSINOPHILS ABSOLUTE: 0 E9/L (ref 0.05–0.5)
EOSINOPHILS RELATIVE PERCENT: 0 % (ref 0–6)
GFR AFRICAN AMERICAN: >60
GFR NON-AFRICAN AMERICAN: >60 ML/MIN/1.73
GLUCOSE BLD-MCNC: 522 MG/DL (ref 74–99)
GLUCOSE URINE: 500 MG/DL
HCG, URINE, POC: NEGATIVE
HCT VFR BLD CALC: 42.5 % (ref 34–48)
HEMOGLOBIN: 13.2 G/DL (ref 11.5–15.5)
IMMATURE GRANULOCYTES #: 0.06 E9/L
IMMATURE GRANULOCYTES %: 0.6 % (ref 0–5)
KETONES, URINE: 40 MG/DL
LACTIC ACID: 3.7 MMOL/L (ref 0.5–2.2)
LEUKOCYTE ESTERASE, URINE: NEGATIVE
LIPASE: 18 U/L (ref 13–60)
LYMPHOCYTES ABSOLUTE: 3.57 E9/L (ref 1.5–4)
LYMPHOCYTES RELATIVE PERCENT: 35.4 % (ref 20–42)
Lab: NORMAL
MCH RBC QN AUTO: 25.4 PG (ref 26–35)
MCHC RBC AUTO-ENTMCNC: 31.1 % (ref 32–34.5)
MCV RBC AUTO: 81.7 FL (ref 80–99.9)
MONOCYTES ABSOLUTE: 0.58 E9/L (ref 0.1–0.95)
MONOCYTES RELATIVE PERCENT: 5.7 % (ref 2–12)
NEGATIVE QC PASS/FAIL: NORMAL
NEUTROPHILS ABSOLUTE: 5.85 E9/L (ref 1.8–7.3)
NEUTROPHILS RELATIVE PERCENT: 58 % (ref 43–80)
NITRITE, URINE: NEGATIVE
PDW BLD-RTO: 16.5 FL (ref 11.5–15)
PH UA: 5.5 (ref 5–9)
PH VENOUS: 7.15 (ref 7.35–7.45)
PLATELET # BLD: 385 E9/L (ref 130–450)
PMV BLD AUTO: 11.6 FL (ref 7–12)
POSITIVE QC PASS/FAIL: NORMAL
POTASSIUM REFLEX MAGNESIUM: 5 MMOL/L (ref 3.5–5)
PROTEIN UA: NEGATIVE MG/DL
RBC # BLD: 5.2 E12/L (ref 3.5–5.5)
SODIUM BLD-SCNC: 132 MMOL/L (ref 132–146)
SPECIFIC GRAVITY UA: 1.02 (ref 1–1.03)
TOTAL PROTEIN: 7.6 G/DL (ref 6.4–8.3)
UROBILINOGEN, URINE: 0.2 E.U./DL
WBC # BLD: 10.1 E9/L (ref 4.5–11.5)

## 2021-01-11 PROCEDURE — 93005 ELECTROCARDIOGRAM TRACING: CPT | Performed by: EMERGENCY MEDICINE

## 2021-01-11 PROCEDURE — 82800 BLOOD PH: CPT

## 2021-01-11 PROCEDURE — 6360000002 HC RX W HCPCS: Performed by: EMERGENCY MEDICINE

## 2021-01-11 PROCEDURE — 2000000000 HC ICU R&B

## 2021-01-11 PROCEDURE — 82010 KETONE BODYS QUAN: CPT

## 2021-01-11 PROCEDURE — 96374 THER/PROPH/DIAG INJ IV PUSH: CPT

## 2021-01-11 PROCEDURE — 2580000003 HC RX 258: Performed by: EMERGENCY MEDICINE

## 2021-01-11 PROCEDURE — 83605 ASSAY OF LACTIC ACID: CPT

## 2021-01-11 PROCEDURE — 99284 EMERGENCY DEPT VISIT MOD MDM: CPT

## 2021-01-11 PROCEDURE — 80053 COMPREHEN METABOLIC PANEL: CPT

## 2021-01-11 PROCEDURE — 81003 URINALYSIS AUTO W/O SCOPE: CPT

## 2021-01-11 PROCEDURE — 85025 COMPLETE CBC W/AUTO DIFF WBC: CPT

## 2021-01-11 PROCEDURE — 83690 ASSAY OF LIPASE: CPT

## 2021-01-11 RX ORDER — ONDANSETRON 2 MG/ML
4 INJECTION INTRAMUSCULAR; INTRAVENOUS ONCE
Status: COMPLETED | OUTPATIENT
Start: 2021-01-11 | End: 2021-01-11

## 2021-01-11 RX ORDER — MORPHINE SULFATE 4 MG/ML
4 INJECTION, SOLUTION INTRAMUSCULAR; INTRAVENOUS ONCE
Status: COMPLETED | OUTPATIENT
Start: 2021-01-12 | End: 2021-01-12

## 2021-01-11 RX ORDER — DEXTROSE MONOHYDRATE 25 G/50ML
12.5 INJECTION, SOLUTION INTRAVENOUS PRN
Status: DISCONTINUED | OUTPATIENT
Start: 2021-01-11 | End: 2021-01-14 | Stop reason: HOSPADM

## 2021-01-11 RX ORDER — DEXTROSE MONOHYDRATE 50 MG/ML
100 INJECTION, SOLUTION INTRAVENOUS PRN
Status: DISCONTINUED | OUTPATIENT
Start: 2021-01-11 | End: 2021-01-14 | Stop reason: HOSPADM

## 2021-01-11 RX ORDER — NICOTINE POLACRILEX 4 MG
15 LOZENGE BUCCAL PRN
Status: DISCONTINUED | OUTPATIENT
Start: 2021-01-11 | End: 2021-01-14 | Stop reason: HOSPADM

## 2021-01-11 RX ORDER — 0.9 % SODIUM CHLORIDE 0.9 %
1000 INTRAVENOUS SOLUTION INTRAVENOUS ONCE
Status: COMPLETED | OUTPATIENT
Start: 2021-01-11 | End: 2021-01-12

## 2021-01-11 RX ADMIN — SODIUM CHLORIDE 1000 ML: 9 INJECTION, SOLUTION INTRAVENOUS at 23:11

## 2021-01-11 RX ADMIN — ONDANSETRON 4 MG: 2 INJECTION INTRAMUSCULAR; INTRAVENOUS at 23:28

## 2021-01-11 ASSESSMENT — PAIN DESCRIPTION - FREQUENCY: FREQUENCY: CONTINUOUS

## 2021-01-11 ASSESSMENT — PAIN DESCRIPTION - PROGRESSION: CLINICAL_PROGRESSION: GRADUALLY WORSENING

## 2021-01-11 ASSESSMENT — PAIN DESCRIPTION - DESCRIPTORS: DESCRIPTORS: SHARP

## 2021-01-11 ASSESSMENT — PAIN SCALES - GENERAL: PAINLEVEL_OUTOF10: 10

## 2021-01-11 ASSESSMENT — PAIN DESCRIPTION - LOCATION: LOCATION: BACK

## 2021-01-12 LAB
ANION GAP SERPL CALCULATED.3IONS-SCNC: 10 MMOL/L (ref 7–16)
ANION GAP SERPL CALCULATED.3IONS-SCNC: 18 MMOL/L (ref 7–16)
ANION GAP SERPL CALCULATED.3IONS-SCNC: 9 MMOL/L (ref 7–16)
BASOPHILS ABSOLUTE: 0.04 E9/L (ref 0–0.2)
BASOPHILS RELATIVE PERCENT: 0.3 % (ref 0–2)
BUN BLDV-MCNC: 14 MG/DL (ref 6–20)
BUN BLDV-MCNC: 18 MG/DL (ref 6–20)
BUN BLDV-MCNC: 24 MG/DL (ref 6–20)
CALCIUM SERPL-MCNC: 8.3 MG/DL (ref 8.6–10.2)
CALCIUM SERPL-MCNC: 8.7 MG/DL (ref 8.6–10.2)
CALCIUM SERPL-MCNC: 9.7 MG/DL (ref 8.6–10.2)
CHLORIDE BLD-SCNC: 102 MMOL/L (ref 98–107)
CHLORIDE BLD-SCNC: 107 MMOL/L (ref 98–107)
CHLORIDE BLD-SCNC: 110 MMOL/L (ref 98–107)
CO2: 14 MMOL/L (ref 22–29)
CO2: 19 MMOL/L (ref 22–29)
CO2: 19 MMOL/L (ref 22–29)
CREAT SERPL-MCNC: 0.5 MG/DL (ref 0.5–1)
CREAT SERPL-MCNC: 0.5 MG/DL (ref 0.5–1)
CREAT SERPL-MCNC: 0.6 MG/DL (ref 0.5–1)
EKG ATRIAL RATE: 133 BPM
EKG P AXIS: 83 DEGREES
EKG P-R INTERVAL: 124 MS
EKG Q-T INTERVAL: 296 MS
EKG QRS DURATION: 60 MS
EKG QTC CALCULATION (BAZETT): 440 MS
EKG R AXIS: 67 DEGREES
EKG T AXIS: 73 DEGREES
EKG VENTRICULAR RATE: 133 BPM
EOSINOPHILS ABSOLUTE: 0 E9/L (ref 0.05–0.5)
EOSINOPHILS RELATIVE PERCENT: 0 % (ref 0–6)
GFR AFRICAN AMERICAN: >60
GFR NON-AFRICAN AMERICAN: >60 ML/MIN/1.73
GLUCOSE BLD-MCNC: 141 MG/DL (ref 74–99)
GLUCOSE BLD-MCNC: 217 MG/DL (ref 74–99)
GLUCOSE BLD-MCNC: 263 MG/DL (ref 74–99)
GLUCOSE BLD-MCNC: 535 MG/DL (ref 74–99)
HCT VFR BLD CALC: 44.8 % (ref 34–48)
HEMOGLOBIN: 13.7 G/DL (ref 11.5–15.5)
IMMATURE GRANULOCYTES #: 0.04 E9/L
IMMATURE GRANULOCYTES %: 0.3 % (ref 0–5)
LACTIC ACID: 2.8 MMOL/L (ref 0.5–2.2)
LYMPHOCYTES ABSOLUTE: 4.41 E9/L (ref 1.5–4)
LYMPHOCYTES RELATIVE PERCENT: 35.7 % (ref 20–42)
MAGNESIUM: 1.7 MG/DL (ref 1.6–2.6)
MAGNESIUM: 1.8 MG/DL (ref 1.6–2.6)
MAGNESIUM: 2.4 MG/DL (ref 1.6–2.6)
MCH RBC QN AUTO: 24.9 PG (ref 26–35)
MCHC RBC AUTO-ENTMCNC: 30.6 % (ref 32–34.5)
MCV RBC AUTO: 81.5 FL (ref 80–99.9)
METER GLUCOSE: 128 MG/DL (ref 74–99)
METER GLUCOSE: 133 MG/DL (ref 74–99)
METER GLUCOSE: 145 MG/DL (ref 74–99)
METER GLUCOSE: 169 MG/DL (ref 74–99)
METER GLUCOSE: 183 MG/DL (ref 74–99)
METER GLUCOSE: 196 MG/DL (ref 74–99)
METER GLUCOSE: 203 MG/DL (ref 74–99)
METER GLUCOSE: 224 MG/DL (ref 74–99)
METER GLUCOSE: 232 MG/DL (ref 74–99)
METER GLUCOSE: 266 MG/DL (ref 74–99)
METER GLUCOSE: 271 MG/DL (ref 74–99)
METER GLUCOSE: 320 MG/DL (ref 74–99)
METER GLUCOSE: >500 MG/DL (ref 74–99)
METER GLUCOSE: >500 MG/DL (ref 74–99)
MONOCYTES ABSOLUTE: 0.63 E9/L (ref 0.1–0.95)
MONOCYTES RELATIVE PERCENT: 5.1 % (ref 2–12)
NEUTROPHILS ABSOLUTE: 7.24 E9/L (ref 1.8–7.3)
NEUTROPHILS RELATIVE PERCENT: 58.6 % (ref 43–80)
PDW BLD-RTO: 16.5 FL (ref 11.5–15)
PHOSPHORUS: 3.3 MG/DL (ref 2.5–4.5)
PHOSPHORUS: 3.5 MG/DL (ref 2.5–4.5)
PHOSPHORUS: 4.3 MG/DL (ref 2.5–4.5)
PLATELET # BLD: 396 E9/L (ref 130–450)
PMV BLD AUTO: 11.1 FL (ref 7–12)
POTASSIUM SERPL-SCNC: 3.9 MMOL/L (ref 3.5–5)
POTASSIUM SERPL-SCNC: 4.1 MMOL/L (ref 3.5–5)
POTASSIUM SERPL-SCNC: 4.6 MMOL/L (ref 3.5–5)
POTASSIUM SERPL-SCNC: 6.4 MMOL/L (ref 3.5–5)
RBC # BLD: 5.5 E12/L (ref 3.5–5.5)
SODIUM BLD-SCNC: 134 MMOL/L (ref 132–146)
SODIUM BLD-SCNC: 135 MMOL/L (ref 132–146)
SODIUM BLD-SCNC: 139 MMOL/L (ref 132–146)
WBC # BLD: 12.4 E9/L (ref 4.5–11.5)

## 2021-01-12 PROCEDURE — 85025 COMPLETE CBC W/AUTO DIFF WBC: CPT

## 2021-01-12 PROCEDURE — 82947 ASSAY GLUCOSE BLOOD QUANT: CPT

## 2021-01-12 PROCEDURE — 84132 ASSAY OF SERUM POTASSIUM: CPT

## 2021-01-12 PROCEDURE — 2500000003 HC RX 250 WO HCPCS: Performed by: INTERNAL MEDICINE

## 2021-01-12 PROCEDURE — 83735 ASSAY OF MAGNESIUM: CPT

## 2021-01-12 PROCEDURE — 84100 ASSAY OF PHOSPHORUS: CPT

## 2021-01-12 PROCEDURE — 6370000000 HC RX 637 (ALT 250 FOR IP): Performed by: EMERGENCY MEDICINE

## 2021-01-12 PROCEDURE — 36415 COLL VENOUS BLD VENIPUNCTURE: CPT

## 2021-01-12 PROCEDURE — 2580000003 HC RX 258: Performed by: EMERGENCY MEDICINE

## 2021-01-12 PROCEDURE — 80048 BASIC METABOLIC PNL TOTAL CA: CPT

## 2021-01-12 PROCEDURE — 83605 ASSAY OF LACTIC ACID: CPT

## 2021-01-12 PROCEDURE — 93010 ELECTROCARDIOGRAM REPORT: CPT | Performed by: INTERNAL MEDICINE

## 2021-01-12 PROCEDURE — 99222 1ST HOSP IP/OBS MODERATE 55: CPT | Performed by: INTERNAL MEDICINE

## 2021-01-12 PROCEDURE — 6370000000 HC RX 637 (ALT 250 FOR IP): Performed by: INTERNAL MEDICINE

## 2021-01-12 PROCEDURE — 6360000002 HC RX W HCPCS: Performed by: INTERNAL MEDICINE

## 2021-01-12 PROCEDURE — 99254 IP/OBS CNSLTJ NEW/EST MOD 60: CPT | Performed by: INTERNAL MEDICINE

## 2021-01-12 PROCEDURE — 2580000003 HC RX 258: Performed by: INTERNAL MEDICINE

## 2021-01-12 PROCEDURE — 82962 GLUCOSE BLOOD TEST: CPT

## 2021-01-12 PROCEDURE — 6360000002 HC RX W HCPCS: Performed by: EMERGENCY MEDICINE

## 2021-01-12 PROCEDURE — 1200000000 HC SEMI PRIVATE

## 2021-01-12 RX ORDER — DEXTROSE AND SODIUM CHLORIDE 5; .45 G/100ML; G/100ML
INJECTION, SOLUTION INTRAVENOUS CONTINUOUS
Status: DISCONTINUED | OUTPATIENT
Start: 2021-01-12 | End: 2021-01-12

## 2021-01-12 RX ORDER — POTASSIUM CHLORIDE 7.45 MG/ML
10 INJECTION INTRAVENOUS PRN
Status: DISCONTINUED | OUTPATIENT
Start: 2021-01-12 | End: 2021-01-12

## 2021-01-12 RX ORDER — INSULIN GLARGINE 100 [IU]/ML
25 INJECTION, SOLUTION SUBCUTANEOUS NIGHTLY
Status: DISCONTINUED | OUTPATIENT
Start: 2021-01-12 | End: 2021-01-12

## 2021-01-12 RX ORDER — DEXTROSE, SODIUM CHLORIDE, AND POTASSIUM CHLORIDE 5; .45; .15 G/100ML; G/100ML; G/100ML
INJECTION INTRAVENOUS CONTINUOUS PRN
Status: DISCONTINUED | OUTPATIENT
Start: 2021-01-12 | End: 2021-01-12

## 2021-01-12 RX ORDER — INSULIN GLARGINE 100 [IU]/ML
14 INJECTION, SOLUTION SUBCUTANEOUS EVERY MORNING
Status: DISCONTINUED | OUTPATIENT
Start: 2021-01-13 | End: 2021-01-14

## 2021-01-12 RX ORDER — DEXTROSE MONOHYDRATE 25 G/50ML
12.5 INJECTION, SOLUTION INTRAVENOUS PRN
Status: DISCONTINUED | OUTPATIENT
Start: 2021-01-12 | End: 2021-01-14 | Stop reason: HOSPADM

## 2021-01-12 RX ORDER — MAGNESIUM SULFATE 1 G/100ML
1 INJECTION INTRAVENOUS PRN
Status: DISCONTINUED | OUTPATIENT
Start: 2021-01-12 | End: 2021-01-12

## 2021-01-12 RX ORDER — SODIUM CHLORIDE 9 MG/ML
INJECTION, SOLUTION INTRAVENOUS CONTINUOUS
Status: DISCONTINUED | OUTPATIENT
Start: 2021-01-12 | End: 2021-01-12

## 2021-01-12 RX ORDER — ACETAMINOPHEN 325 MG/1
650 TABLET ORAL EVERY 6 HOURS PRN
Status: DISCONTINUED | OUTPATIENT
Start: 2021-01-12 | End: 2021-01-14 | Stop reason: HOSPADM

## 2021-01-12 RX ORDER — ONDANSETRON 2 MG/ML
4 INJECTION INTRAMUSCULAR; INTRAVENOUS EVERY 6 HOURS PRN
Status: DISCONTINUED | OUTPATIENT
Start: 2021-01-12 | End: 2021-01-14 | Stop reason: HOSPADM

## 2021-01-12 RX ORDER — INSULIN GLARGINE 100 [IU]/ML
20 INJECTION, SOLUTION SUBCUTANEOUS ONCE
Status: COMPLETED | OUTPATIENT
Start: 2021-01-12 | End: 2021-01-12

## 2021-01-12 RX ADMIN — MORPHINE SULFATE 4 MG: 4 INJECTION, SOLUTION INTRAMUSCULAR; INTRAVENOUS at 00:05

## 2021-01-12 RX ADMIN — ACETAMINOPHEN 650 MG: 325 TABLET ORAL at 22:40

## 2021-01-12 RX ADMIN — POTASSIUM CHLORIDE 10 MEQ: 7.46 INJECTION, SOLUTION INTRAVENOUS at 10:50

## 2021-01-12 RX ADMIN — SODIUM CHLORIDE: 9 INJECTION, SOLUTION INTRAVENOUS at 01:41

## 2021-01-12 RX ADMIN — HYDROMORPHONE HYDROCHLORIDE 0.5 MG: 1 INJECTION, SOLUTION INTRAMUSCULAR; INTRAVENOUS; SUBCUTANEOUS at 01:48

## 2021-01-12 RX ADMIN — INSULIN LISPRO 4 UNITS: 100 INJECTION, SOLUTION INTRAVENOUS; SUBCUTANEOUS at 20:33

## 2021-01-12 RX ADMIN — DEXTROSE, SODIUM CHLORIDE, AND POTASSIUM CHLORIDE: 5; .45; .15 INJECTION INTRAVENOUS at 04:24

## 2021-01-12 RX ADMIN — HYDROMORPHONE HYDROCHLORIDE 0.5 MG: 1 INJECTION, SOLUTION INTRAMUSCULAR; INTRAVENOUS; SUBCUTANEOUS at 10:31

## 2021-01-12 RX ADMIN — DEXTROSE AND SODIUM CHLORIDE: 5; 450 INJECTION, SOLUTION INTRAVENOUS at 06:00

## 2021-01-12 RX ADMIN — INSULIN GLARGINE 20 UNITS: 100 INJECTION, SOLUTION SUBCUTANEOUS at 13:56

## 2021-01-12 RX ADMIN — HYDROMORPHONE HYDROCHLORIDE 0.5 MG: 1 INJECTION, SOLUTION INTRAMUSCULAR; INTRAVENOUS; SUBCUTANEOUS at 06:00

## 2021-01-12 RX ADMIN — SODIUM CHLORIDE 0.1 UNITS/KG/HR: 9 INJECTION, SOLUTION INTRAVENOUS at 00:03

## 2021-01-12 RX ADMIN — SODIUM CHLORIDE 1000 ML: 9 INJECTION, SOLUTION INTRAVENOUS at 00:10

## 2021-01-12 RX ADMIN — POTASSIUM CHLORIDE 10 MEQ: 7.46 INJECTION, SOLUTION INTRAVENOUS at 06:18

## 2021-01-12 ASSESSMENT — PAIN DESCRIPTION - DESCRIPTORS: DESCRIPTORS: ACHING

## 2021-01-12 ASSESSMENT — PAIN SCALES - GENERAL
PAINLEVEL_OUTOF10: 10
PAINLEVEL_OUTOF10: 8
PAINLEVEL_OUTOF10: 5
PAINLEVEL_OUTOF10: 9
PAINLEVEL_OUTOF10: 0

## 2021-01-12 ASSESSMENT — PAIN DESCRIPTION - PAIN TYPE
TYPE: ACUTE PAIN
TYPE: ACUTE PAIN

## 2021-01-12 ASSESSMENT — PAIN DESCRIPTION - FREQUENCY
FREQUENCY: CONTINUOUS
FREQUENCY: CONTINUOUS

## 2021-01-12 ASSESSMENT — PAIN DESCRIPTION - ORIENTATION: ORIENTATION: LOWER

## 2021-01-12 ASSESSMENT — PAIN - FUNCTIONAL ASSESSMENT: PAIN_FUNCTIONAL_ASSESSMENT: ACTIVITIES ARE NOT PREVENTED

## 2021-01-12 ASSESSMENT — PAIN DESCRIPTION - LOCATION: LOCATION: BACK

## 2021-01-12 NOTE — ED NOTES
Report called to ICU Indu Russo RN. Patient ready for transport, ICU to come get her.      Ofe Fam RN  01/12/21 0025

## 2021-01-12 NOTE — CONSULTS
Critical Care Admit/Consult Note         Patient - Gerard Donahue   MRN -  [id number]   Kim[place]ade # - [de-identified]   - 1995      Date of Admission -  2021 10:43 PM  Date of evaluation -  2021  [id number]   Hospital Day - 1            ADMIT/CONSULT DETAILS     Reason for Admit/Consult   DKA    Consulting Mechelle Franklin MD  Primary Care Physician - DO TRINI Barber   The patient is a 23 y.o. female with significant past medical history of type 1 diabetes mellitus poorly controlled, dehydration, protein calorie malnutrition, depression presenting to the intensive care unit for treatment of DKA. Patient was found to be hyperglycemic and a DKA. Patient was started on insulin infusion and DKA protocols. She was then admitted to the intensive care unit for further treatment and evaluation. Patient admits to having some nausea with vomiting with this DKA she also has some chronic back pain that is worsened when she is in DKA per the patient. Past Medical History         Diagnosis Date    Bleeding at insertion site 2018    Common femoral artery injury, right, initial encounter 2018    Depressive disorder     DM type 1 (diabetes mellitus, type 1) ([facility] [address]         Past Surgical History           Procedure Laterality Date    ABDOMEN SURGERY N/A 2019    INCISION AND DRAINAGE OF SUPRAPUBIC ABSESS performed by Marty Moseley MD at [address] [place] [address]      last yr       SOCIAL AND OCCUPATIONAL HEALTH:  The patient is a Never smoker of @year@. Pack year equal ____. There  is not history of TB or TB exposure. There is not asbestos or silica dust exposure. The patient reports does not have coal, foundry, quarry or [employer] exposure. Travel history reveals no recent travel. There is not  history of recreational or IV drug use. There is not hot tube exposure.  The patient does not pets, dogs, cats turtles or exotic and dysuria  INTEGUMENT/BREAST:  negative for rash and skin lesion(s)  HEMATOLOGIC/LYMPHATIC:  negative for easy bruising and bleeding  ALLERGIC/IMMUNOLOGIC:  negative for recurrent infections and urticaria  ENDOCRINE:  negative for weight changes and diabetic symptoms including neither polyuria nor polydipsia  MUSCULOSKELETAL: Positive for back pain, negative for muscle weakness   NEUROLOGICAL:  negative for headaches, dizziness and numbness  BEHAVIOR/PSYCH:  negative for poor appetite and anxiety    Lines and Devices   Bilateral peripheral IVs.    Mechanical Ventilation Data   VENT SETTINGS (Comprehensive)  Vent Information  SpO2: 96 %  Additional Respiratory  Assessments  Pulse: 109  Resp: 13  SpO2: 96 %  Oral Care: Mouth moisturizer, Mouth swabbed    ABG  Lab Results   Component Value Date    PH 7.282 11/10/2020    PCO2 47.5 11/10/2020    PO2 20.8 11/10/2020    HCO3 21.9 11/10/2020    O2SAT 26.5 11/10/2020     Lab Results   Component Value Date    MODE RA 11/10/2020           Vitals    height is 5' 1\" (1.549 m) and weight is 84 lb 10.5 oz (38.4 kg). Her oral temperature is 97.9 °F (36.6 °C). Her blood pressure is 108/68 and her pulse is 109. Her respiration is 13 and oxygen saturation is 96%.        Temperature Range: Temp: 97.9 °F (36.6 °C) Temp  Av.7 °F (36.5 °C)  Min: 97.6 °F (36.4 °C)  Max: 97.9 °F (36.6 °C)  BP Range:  Systolic (49OTL), KCZ:556 , Min:91 , DDZ:045     Diastolic (48XAR), LFP:26, Min:59, Max:88    Pulse Range: Pulse  Av.4  Min: 109  Max: 130  Respiration Range: Resp  Av.6  Min: 9  Max: 20  Current Pulse Ox[de-identified]  SpO2: 96 %  24HR Pulse Ox Range:  SpO2  Av.4 %  Min: 96 %  Max: 100 %  Oxygen Amount and Delivery:        I/O (24 Hours)    Patient Vitals for the past 8 hrs:   BP Temp Temp src Pulse Resp   21 1000 108/68 -- -- 109 13   21 0800 -- 97.9 °F (36.6 °C) Oral -- --   21 0605 108/64 -- -- 120 9       Intake/Output Summary (Last 24 hours) at 2021 [facility] filed at 1/12/2021 0624  Gross per 24 hour   Intake 904 ml   Output --   Net 904 ml     I/O last 3 completed shifts: In: 904 [I.V.:904]  Out: -    Date 01/12/21 0000 - 01/12/21 2359   Shift 7102-9256 5118-8807 0317-5678 24 Hour Total   INTAKE   I.V.(mL/kg) 904(23.5)   904(23.5)   Shift Total(mL/kg) 904(23.5)   904(23.5)   OUTPUT   Shift Total(mL/kg)       Weight (kg) 38.4 38.4 38.4 38.4     Patient Vitals for the past 96 hrs (Last 3 readings):   Weight   01/12/21 0106 84 lb 10.5 oz (38.4 kg)   01/11/21 2300 90 lb (40.8 kg)         Drains/Tubes Outputs  None  Exam         PHYSICAL EXAM:    General appearance - alert, well appearing, and in no distress and oriented to person, place, and time. Mental status - alert, oriented to person, place, and time, normal mood, behavior, speech, dress, motor activity, and thought processes  Eyes - pupils equal and reactive, extraocular eye movements intact, sclera anicteric  Ears - external ear normal  Nose - normal and patent, no erythema, discharge or polyps  Mouth - mucous membranes moist, pharynx normal without lesions  Neck - supple, no significant adenopathy  Chest - clear to auscultation, no wheezes, rales or rhonchi, symmetric air entry  Heart -tachycardic rate, regular rhythm, normal S1, S2, no murmurs, rubs, clicks or gallops  Abdomen - soft, nontender, nondistended, no masses or organomegaly  Neurological - alert, oriented, normal speech, no focal findings or movement disorder noted  Extremities - peripheral pulses normal, no clubbing or cyanosis. No edema.   Skin - normal coloration and turgor, no rashes, no suspicious skin lesions noted     Data   Old records and images have been reviewed    Lab Results   CBC     Lab Results   Component Value Date    WBC 12.4 01/12/2021    RBC 5.50 01/12/2021    HGB 13.7 01/12/2021    HCT 44.8 01/12/2021     01/12/2021    MCV 81.5 01/12/2021    MCH 24.9 01/12/2021    MCHC 30.6 01/12/2021    RDW 16.5 01/12/2021 NRBC 0.0 05/10/2019    SEGSPCT 58 09/29/2013    METASPCT 1.0 04/29/2020    LYMPHOPCT 35.7 01/12/2021    MONOPCT 5.1 01/12/2021    MYELOPCT 1.0 04/29/2020    BASOPCT 0.3 01/12/2021    MONOSABS 0.63 01/12/2021    LYMPHSABS 4.41 01/12/2021    EOSABS 0.00 01/12/2021    BASOSABS 0.04 01/12/2021       BMP   Lab Results   Component Value Date     01/12/2021    K 4.6 01/12/2021    K 5.0 01/11/2021     01/12/2021    CO2 19 01/12/2021    BUN 14 01/12/2021    CREATININE 0.5 01/12/2021    GLUCOSE 217 01/12/2021    CALCIUM 8.3 01/12/2021       LFTS  Lab Results   Component Value Date    ALKPHOS 92 01/11/2021    ALT 37 01/11/2021    AST 30 01/11/2021    PROT 7.6 01/11/2021    BILITOT 0.3 01/11/2021    BILIDIR <0.2 12/14/2020    IBILI see below 12/14/2020    LABALBU 4.0 01/11/2021       INR  No results for input(s): PROTIME, INR in the last 72 hours. APTT  No results for input(s): APTT in the last 72 hours. Lactic Acid  Lab Results   Component Value Date    LACTA 2.8 01/12/2021    LACTA 3.7 01/11/2021    LACTA 1.4 12/14/2020        BNP   No results for input(s): BNP in the last 72 hours. Cultures     No results for input(s): BC in the last 72 hours. No results for input(s): Milli Oven in the last 72 hours. No results for input(s): LABURIN in the last 72 hours. Radiology   Ct Abdomen Pelvis Wo Contrast    Result Date: 12/18/2020  EXAMINATION: CT OF THE ABDOMEN AND PELVIS WITHOUT CONTRAST 12/18/2020 7:01 pm TECHNIQUE: CT of the abdomen and pelvis was performed without the administration of intravenous contrast. Multiplanar reformatted images are provided for review. Dose modulation, iterative reconstruction, and/or weight based adjustment of the mA/kV was utilized to reduce the radiation dose to as low as reasonably achievable. COMPARISON: None.  HISTORY: ORDERING SYSTEM PROVIDED HISTORY: Pain TECHNOLOGIST PROVIDED HISTORY: Reason for exam:->Pain FINDINGS: Lower Chest: The lung bases are unremarkable. Organs: The liver, spleen and, adrenal glands, kidneys, pancreas and gallbladder are unremarkable. GI/Bowel: Diffuse colonic fecal retention with significant stool burden. The small bowel is unremarkable. The appendix is normal. Pelvis: The urinary bladder is grossly normal. Peritoneum/Retroperitoneum: . No free air or significant free fluid. Bones/Soft Tissues: The osseous structures are unremarkable. Diffuse colonic fecal retention with significant stool burden. The remainder of the exam is unremarkable. Xr Abdomen (kub) (single Ap View)    Result Date: 12/15/2020  EXAMINATION: ONE SUPINE XRAY VIEW(S) OF THE ABDOMEN 12/15/2020 7:32 am COMPARISON: 11/10/2020 HISTORY: ORDERING SYSTEM PROVIDED HISTORY: assess stomach bubble, abdominal pain TECHNOLOGIST PROVIDED HISTORY: Reason for exam:->assess stomach bubble, abdominal pain FINDINGS: The bowel gas pattern is nonspecific and nonobstructive. There is air and stool in normal caliber colon. There is gas scattered within nondilated small bowel. Radiographically, the stomach does not appear abnormally distended. There are no suspicious calcifications seen. Contrast material is seen within the bladder. Nonspecific, nonobstructive abdomen. Grossly the stomach does not appear abnormally distended. Is    Ct Head Wo Contrast    Result Date: 12/18/2020  EXAMINATION: CT OF THE HEAD WITHOUT CONTRAST  12/18/2020 5:16 pm TECHNIQUE: CT of the head was performed without the administration of intravenous contrast. Dose modulation, iterative reconstruction, and/or weight based adjustment of the mA/kV was utilized to reduce the radiation dose to as low as reasonably achievable. COMPARISON: October 11, 2020 HISTORY: ORDERING SYSTEM PROVIDED HISTORY: blurred vision TECHNOLOGIST PROVIDED HISTORY: Reason for exam:->blurred vision Has a \"code stroke\" or \"stroke alert\" been called? ->No FINDINGS: BRAIN/VENTRICLES: There is no acute intracranial hemorrhage, mass effect or midline shift. No abnormal extra-axial fluid collection. The gray-white differentiation is maintained without evidence of an acute infarct. There is no evidence of hydrocephalus. ORBITS: The visualized portion of the orbits demonstrate no acute abnormality. SINUSES: The visualized paranasal sinuses and mastoid air cells demonstrate no acute abnormality. SOFT TISSUES/SKULL:  No acute abnormality of the visualized skull or soft tissues. No acute intracranial abnormality. Ct Abdomen Pelvis W Iv Contrast Additional Contrast? None    Result Date: 12/14/2020  EXAMINATION: CT OF THE ABDOMEN AND PELVIS WITH CONTRAST 12/14/2020 5:32 pm TECHNIQUE: CT of the abdomen and pelvis was performed with the administration of intravenous contrast. Multiplanar reformatted images are provided for review. Dose modulation, iterative reconstruction, and/or weight based adjustment of the mA/kV was utilized to reduce the radiation dose to as low as reasonably achievable. COMPARISON: None. HISTORY: ORDERING SYSTEM PROVIDED HISTORY: abdominal pain TECHNOLOGIST PROVIDED HISTORY: Additional Contrast?->None Reason for exam:->abdominal pain FINDINGS: Lower Chest: Clear. No infiltrates November 10, 2020 Organs: Unremarkable GI/Bowel: The stomach and proximal duodenum is distended and filled with fluid. The small bowel distal to the transfers duodenum is decompressed. Large amount of retained stool noted in the colon. Pelvis: Minimal free fluid, likely physiologic. The bladder is incompletely distended, resulting in pseudo thickening of the bladder wall. Peritoneum/Retroperitoneum: No ascites, free air or adenopathy. Bones/Soft Tissues: Unremarkable. Distended and fluid-filled stomach and proximal duodenum, with decompressed small bowel distal to the transverse segment of the duodenum. In addition the mesenteries angle appears to be reduced. The appearance raises concern for SMA syndrome.  Critical results were called by  Kristen Anderson to [facility] on 12/14/2020 at 18:19. Xr Abdomen For Ng/og/ne Tube Placement    Result Date: 12/14/2020  EXAMINATION: ONE SUPINE XRAY VIEW(S) OF THE ABDOMEN 12/14/2020 8:24 pm COMPARISON: None. HISTORY: ORDERING SYSTEM PROVIDED HISTORY: Confirmation of course of NG/OG/NE tube and location of tip of tube TECHNOLOGIST PROVIDED HISTORY: Reason for exam:->Confirmation of course of NG/OG/NE tube and location of tip of tube Portable? ->Yes FINDINGS: Tip of NG tube noted in the region of stomach. Bowel gas pattern is nonobstructive. No free air. Residual contrast noted in the renal collecting systems, presumably from recent contrast enhanced CT scan. Tip of NG tube below the diaphragm. SYSTEMS ASSESSMENT    Neuro   Alert oriented no acute issues    Respiratory   Saturating well on room air. No acute issues      Cardiovascular   Tachycardic however improved with IV fluids. Continue to monitor      Gastrointestinal   Chronic nausea with vomiting abdominal pain. Patient treated with Zofran and Phenergan for her nausea and vomiting with symptom improvement. Continue to monitor rehydrate patient    Renal   No acute issues     Infectious Disease   No acute issues    Hematology/Oncology   Hemoglobin stable at this time continue to monitor. Transfuse her hemoglobin under 7.0. Endocrine   7 diabetes mellitus. Continue DKA protocol. Transition to Lantus when to repeated BMPs showed normal gap. Endocrinology consult for better outpatient management of her diabetes    Social/Spiritual/DNR/Other   Full code    ASSESSMENT/ PLAN   1. Type 1 diabetes in DKA, continue DKA protocol transition to subcu insulin when gap closed x2  2. Chronic noncompliance with diabetic medications, social work consult  3. Needs better control of diabetes, endocrinology consult  4. Transfer patient to [facility] once gap closed x2 and transitioned successfully over to subcu insulin.         Leroy Garces DO, PGY1  1:51

## 2021-01-12 NOTE — PLAN OF CARE
Problem: Discharge Planning:  Goal: Discharged to appropriate level of care  Description: Discharged to appropriate level of care  Outcome: Not Met This Shift     Problem: Fluid Volume - Imbalance:  Goal: Will remain free of signs and symptoms of dehydration  Description: Will remain free of signs and symptoms of dehydration  Outcome: Not Met This Shift  Goal: Absence of imbalanced fluid volume signs and symptoms  Description: Absence of imbalanced fluid volume signs and symptoms  Outcome: Not Met This Shift     Problem: Serum Glucose Level - Abnormal:  Goal: Ability to maintain appropriate glucose levels will improve  Description: Ability to maintain appropriate glucose levels will improve  Outcome: Not Met This Shift     Problem: Injury - Acid Base Imbalance:  Goal: Acid-base balance  Description: Acid-base balance  Outcome: Not Met This Shift     Problem: Pain:  Goal: Pain level will decrease  Description: Pain level will decrease  Outcome: Met This Shift  Goal: Control of acute pain  Description: Control of acute pain  Outcome: Met This Shift  Goal: Control of chronic pain  Description: Control of chronic pain  Outcome: Met This Shift     Problem: Pain:  Goal: Pain level will decrease  Description: Pain level will decrease  Outcome: Met This Shift  Goal: Control of acute pain  Description: Control of acute pain  Outcome: Met This Shift  Goal: Control of chronic pain  Description: Control of chronic pain  Outcome: Met This Shift

## 2021-01-12 NOTE — CARE COORDINATION
Met w/ patient. Patient well known to case management d/t multiple admissions. Lives w/ grandmother. Independent PTA. Endocrinologist is Dr. Justin Sheth- pt is to have a video visit w/  at 2pm. PCP is Dr. Jose Sorto and pharmacy is [facility]. Has insulin pump but states has not used since October 2020. Has a glucometer and all other diabetic testing supplies-\[facility] [place] [facility]\"" has no difficulty obtaining medications. Hx [facility]- declining HHC on discharge. Plan remains to return home w/ grandmother on discharge.  Will follow Gregory Jeffries

## 2021-01-12 NOTE — H&P
reports that she has never smoked. She has never used smokeless tobacco.  ETOH:   reports no history of alcohol use. Family History:       Problem Relation Age of Onset    Diabetes Maternal Grandmother     Diabetes Paternal Grandmother     Stroke Other     Thyroid Disease Neg Hx       Or deferred/otherwise considered non contributory to current admission  PHYSICAL EXAM:    VS: /73   Pulse 126   Temp 97.7 °F (36.5 °C) (Oral)   Resp 20   Ht 5' 1\" (1.549 m)   Wt 90 lb (40.8 kg)   SpO2 100%   BMI 17.01 kg/m²     General Appearance:     + acute distress. Psych:  HEENT:    A.O. As per HPI details  NC/AT, PERRL, no pallor no icterus, lips/ext mucous membrane grossly dry     Neck:   Supple, trachea midline, no obvious JVD   Resp:     CTAB, No wheezes, No rhonchi   Chest wall:    No tenderness or deformity   Heart:    Regular rate and rhythm, S1 and S2 normal, no rub or gallop. Abdomen:     Soft, +_mild epigastric-tender, bowel sounds active    no suspicious obvious masses/organomegaly   Genitalia & Rectal:    Deferred.    Extremities x4:   Extremities normal, atraumatic, no cyanosis, no clubbing   Musculoskeletal:      NO active synovitis or swollen b/l wrists, 2-5 MCPs examined   Skin:   Skin color, texture, turgor  dryno ACUTE rashes or lesions in lower legs and arms examined   Lymph nodes:   Cervical nodes grossly normal   Neurologic:  .Grossly symmetric  strength in UEs and LEs with symmetric grossly intact to light touch sensation     LABS:  CBC:   Lab Results   Component Value Date    WBC 10.1 01/11/2021    RBC 5.20 01/11/2021    HGB 13.2 01/11/2021    HCT 42.5 01/11/2021     01/11/2021    MCV 81.7 01/11/2021     BMP:    Lab Results   Component Value Date     01/11/2021    K 5.0 01/11/2021     01/11/2021    CO2 7 01/11/2021    BUN 27 01/11/2021    CREATININE 0.7 01/11/2021    GLUCOSE 522 01/11/2021    CALCIUM 8.5 01/11/2021     Hepatic Function Panel:    Lab Results Component Value Date    ALKPHOS 92 01/11/2021    AST 30 01/11/2021    ALT 37 01/11/2021    PROT 7.6 01/11/2021    LABALBU 4.0 01/11/2021    BILITOT 0.3 01/11/2021     Magnesium:    Lab Results   Component Value Date    MG 1.7 12/16/2020       PT/INR:    Lab Results   Component Value Date    PROTIME 10.7 12/14/2020    INR 0.9 12/14/2020     U/A:   Lab Results   Component Value Date    NITRITE negative 04/17/2018    LEUKOCYTESUR Negative 01/11/2021    PHUR 5.5 01/11/2021    WBCUA 0-1 12/14/2020    RBCUA NONE 12/14/2020    BACTERIA NONE SEEN 12/14/2020    SPECGRAV 1.020 01/11/2021    BLOODU Negative 01/11/2021    GLUCOSEU 500 01/11/2021     ABG:  No results found for: PHART, HKH4TCK, PO2ART, S7HXLWZO, CRH7AKC, BEART  TSH:  No results found for: TSH  Cardiac Enzymes:   Lab Results   Component Value Date    CKTOTAL 99 10/18/2020    CKTOTAL 277 (H) 10/09/2020    CKTOTAL 282 (H) 10/08/2020    CKMB <1.0 10/18/2020    TROPONINI <0.01 12/14/2020    TROPONINI <0.01 11/10/2020    TROPONINI <0.01 10/18/2020       Radiology: No results found. EKG:  Sinus tachycardia   Possible Left atrial enlargement   Borderline ECG   When compared with ECG of 18-DEC-2020 17:49,   T wave inversion less evident in Anterolateral leads   Assessment & Plan   ACTIVE hospital problems being addressed/reassessed for this admission:  Principal Problem:    DKA, type 1, not at goal [facility])  Active Problems:    Personal history of noncompliance with medical treatment, presenting hazards to health    Elevated lactic acid level    Severe dehydration    Nausea, vomiting and diarrhea  Resolved Problems:    * No resolved hospital problems.  *    Code status/DVT prophylaxis and PLAN --see orders   Note extensive time spent coordinating care between ER docs, ER and floor nurses, and transitioning care over to day providers  Plan of care/ clinical impressions/communication specifics detailed below:    23 y.o. female    Hx of covid few months ago  Known dm1 history of insulin-dependent diabetes. Long hx of dm1 poorly controlled, recurrent admissions of  DKA every few months/weeks. She claims compliant with her insulin. She states that today she developed several episodes of nonbloody, nonbilious emesis- this is a recurrent issue-- per notes from last admission:  Hx of NG tube use in past-- but not this admission    SBO rule out in past-- Surgery --\" KUB which showed nonspecific, nonobstructive abdomen. As per surgery she had recent gastric emptying study and HIDA which were negative and if her symptoms persist, gastric dilatation persist might need CorPak in the jejunum for nutritional support\"    lower back pain which he states is a chronic problem    DKA +dehydration- ICU, insulin drip etc..  , anion gap of 25, bicarb of 7, pH of 7.14.  + lactic acidosis of 3.7. No signs of infection, URI, pneumonia  Urine pregnancy negative. UA showed ketonuria but no evidence of infection.     Jigar Garcia MD   Night Officer, overnight admitting doctor at [facility] call day time doctor   for questions after 7:30am    Covering for [facility]  If Qs please call [phone]  Electronically signed by Rojelio Chi MD on 1/12/2021 at 12:14 AM

## 2021-01-12 NOTE — ED NOTES
Bed: 22  Expected date:   Expected time:   Means of arrival:   Comments:  EMS     Xander Cantu RN  62/04/25 [id number]

## 2021-01-12 NOTE — ED PROVIDER NOTES
HPI:  1/11/21, Time: 11:05 PM YADIRA Price is a 23 y.o. female presenting to the ED for hyperglycemia beginning today. Patient has a history of insulin-dependent diabetes. She states that she is poorly controlled, and she has had DKA in the past.  She states she has been compliant with her insulin. She states that today she developed several episodes of nonbloody, nonbilious emesis. Symptoms have been moderate in severity and intermittent. She also reports associated symptoms of sharp lower back pain which he states is a chronic problem. No recent falls or trauma. No dysuria. She denies recent illness, fevers, cough, loss of taste or smell, abdominal pain, or diarrhea. No known exposures to COVID-19. Review of Systems:   Pertinent positives and negatives are stated within HPI, all other systems reviewed and are negative.          --------------------------------------------- PAST HISTORY ---------------------------------------------  Past Medical History:  has a past medical history of Bleeding at insertion site, Common femoral artery injury, right, initial encounter, Depressive disorder, and DM type 1 ([facility] Past Surgical History:  has a past surgical history that includes Abdomen surgery (N/A, 5/9/2019) and Bartholin gland cyst excision. Social History:  reports that she has never smoked. She has never used smokeless tobacco. She reports current drug use. Drug: Marijuana. She reports that she does not drink alcohol. Family History: family history includes Diabetes in her maternal grandmother and paternal grandmother; Stroke in an other family member. The patients home medications have been reviewed. Allergies: Patient has no known allergies.     -------------------------------------------------- RESULTS -------------------------------------------------  All laboratory and radiology results have been personally reviewed by myself mg/dL    Specific Gravity, UA 1.020 1.005 - 1.030    Blood, Urine Negative Negative    pH, UA 5.5 5.0 - 9.0    Protein, UA Negative Negative mg/dL    Urobilinogen, Urine 0.2 <2.0 E.U./dL    Nitrite, Urine Negative Negative    Leukocyte Esterase, Urine Negative Negative   pH, venous   Result Value Ref Range    pH, Christoph 7.15 (LL) 7.35 - 7.45   Beta-Hydroxybutyrate   Result Value Ref Range    Beta-Hydroxybutyrate >4.50 (H) 0.02 - 0.27 mmol/L   POC Pregnancy Urine   Result Value Ref Range    HCG, Urine, POC Negative Negative    Lot Number [id number]     Positive QC Pass/Fail Pass     Negative QC Pass/Fail Acceptable    POCT Glucose   Result Value Ref Range    Meter Glucose >500 (H) 74 - 99 mg/dL   EKG 12 Lead   Result Value Ref Range    Ventricular Rate 133 BPM    Atrial Rate 133 BPM    P-R Interval 124 ms    QRS Duration 60 ms    Q-T Interval 296 ms    QTc Calculation (Bazett) 440 ms    P Axis 83 degrees    R Axis 67 degrees    T Axis 73 degrees       RADIOLOGY:  Interpreted by Radiologist.  No orders to display       ------------------------- NURSING NOTES AND VITALS REVIEWED ---------------------------   The nursing notes within the ED encounter and vital signs as below have been reviewed. /73   Pulse 126   Temp 97.7 °F (36.5 °C) (Oral)   Resp 20   Ht 5' 1\" (1.549 m)   Wt 90 lb (40.8 kg)   SpO2 100%   BMI 17.01 kg/m²   Oxygen Saturation Interpretation: Normal      ---------------------------------------------------PHYSICAL EXAM--------------------------------------      Constitutional/General: Alert and oriented x3, appears ill, non toxic in NAD  Head: Normocephalic and atraumatic  Eyes: PERRL, EOMI  Mouth: Oropharynx clear, handling secretions, no trismus  Neck: Supple, full ROM,   Pulmonary: Lungs clear to auscultation bilaterally, no wheezes, rales, or rhonchi. Not in respiratory distress  Cardiovascular:  Reguar rhythm, tachycardia, no murmurs, gallops, or rubs.  2+ distal pulses  Abdomen: Soft, non distended, mild generalized abdominal tenderness with no rebound or guarding  Extremities: Moves all extremities x 4. Warm and well perfused. No leg edema. No calf tenderness. Skin: warm and dry without rash  Neurologic: GCS 15, no focal motor or sensory deficits   Psych: Normal Affect      ------------------------------ ED COURSE/MEDICAL DECISION MAKING----------------------  Medications   0.9 % sodium chloride bolus (1,000 mLs Intravenous New Bag 1/12/21 0010)   glucose (GLUTOSE) 40 % oral gel 15 g (has no administration in time range)   dextrose 50 % IV solution (has no administration in time range)   glucagon (rDNA) injection 1 mg (has no administration in time range)   dextrose 5 % solution (has no administration in time range)   insulin regular (HUMULIN R;NOVOLIN R) 100 Units in sodium chloride 0.9 % 100 mL infusion (0.1 Units/kg/hr × 40.8 kg Intravenous New Bag 1/12/21 0003)   0.9 % sodium chloride bolus (0 mLs Intravenous Stopped 1/12/21 0000)   ondansetron (ZOFRAN) injection 4 mg (4 mg Intravenous Given 1/11/21 2328)   morphine sulfate (PF) injection 4 mg (4 mg Intravenous Given 1/12/21 0005)       Medical Decision Making/ED COURSE:   Patient is a 29-year-old female with history of type 1 diabetes presenting with hyperglycemia, nausea, and emesis. In the ED, patient was tachycardic but otherwise hemodynamically stable and afebrile. On exam, patient appeared ill but was nontoxic. Patient was placed on the cardiac monitor. I interpreted findings. Rhythm - sinus. Labs obtained. Patient administered 2 L of IVF and zofran. I reviewed and interpreted labs. Patient was noted to be in DKA with hyperglycemia of 522, anion gap of 25, bicarb of 7, pH of 7.14. She also had a lactic acidosis of 3.7. Urine pregnancy negative. UA showed ketonuria but no evidence of infection. Insulin infusion initiated in the ED. Will be admitted for further treatment.     Patient remained hemodynamically stable throughout ED course. ED Course as of Jan 12 0020   Mon Jan 11, 2021   2304 EKG: This EKG is signed and interpreted by me. Rate: 133  Rhythm: Sinus  Interpretation: Sinus tachycardia, normal ME interval, normal QRS, normal QT interval, no acute ST or T wave changes  Comparison: no previous EKG available        [JA]   2350 Hospitalist was consulted. Dr. Marva Nelson accepted the patient for admission. [JA]   2301 ICU consulted. Dr. Justin Goodman accepted the patient to the ICU    [JA]      ED Course User Index  [JA] Marvin Alexandra MD       Critical Care:  Please note that the withdrawal or failure to initiate urgent interventions for this patient would likely result in a life threatening deterioration or permanent disability. Accordingly this patient received 34 minutes of critical care time, excluding separately billable procedures. Counseling: The emergency provider has spoken with the patient and discussed todays results, in addition to providing specific details for the plan of care and counseling regarding the diagnosis and prognosis. Questions are answered at this time and they are agreeable with the plan.      --------------------------------- IMPRESSION AND DISPOSITION ---------------------------------    IMPRESSION  1. Diabetic ketoacidosis without coma associated with type [facility] ([facility]    2. Metabolic acidosis    3. Lactic acidosis        DISPOSITION  Disposition: Admit to CCU/ICU  Patient condition is stable      NOTE: This report was transcribed using voice recognition software.  Every effort was made to ensure accuracy; however, inadvertent computerized transcription errors may be present    IMarvin MD, am the primary provider of this record       Marvin Alexandra MD  01/12/21 0020

## 2021-01-12 NOTE — PROGRESS NOTES
[facility] Progress Note    Admitting Date and Time: 1/11/2021 10:43 PM  Admit Dx: DKA, type 1, not at goal [facility]) [E10.10]    Subjective:  Patient is being followed for DKA, type 1, not at goal [facility]) [E10.10]   Pt feels thirsty, and tired    ROS: denies fever, chills, cp, sob, n/v, HA unless stated above.            dextrose, 12.5 g, PRN      potassium chloride, 10 mEq, PRN      magnesium sulfate, 1 g, PRN      sodium phosphate IVPB, 10 mmol, PRN    Or      sodium phosphate IVPB, 15 mmol, PRN    Or      sodium phosphate IVPB, 20 mmol, PRN      HYDROmorphone, 0.5 mg, Q4H PRN      ondansetron, 4 mg, Q6H PRN      glucose, 15 g, PRN      dextrose, 12.5 g, PRN      glucagon (rDNA), 1 mg, PRN      dextrose, 100 mL/hr, PRN         Objective:    /64   Pulse 120   Temp 97.6 °F (36.4 °C) (Axillary)   Resp 9   Ht 5' 1\" (1.549 m)   Wt 84 lb 10.5 oz (38.4 kg)   SpO2 96%   BMI 16.00 kg/m²     General Appearance: alert and oriented to person, place and time and in no acute distress  Skin: warm and dry  Head: normocephalic and atraumatic  Eyes: pupils equal, round, and reactive to light, extraocular eye movements intact, conjunctivae normal  Neck: neck supple and non tender without mass   Pulmonary/Chest: clear to auscultation bilaterally- no wheezes, rales or rhonchi, normal air movement, no respiratory distress  Cardiovascular: normal rate, normal S1 and S2 and no carotid bruits  Abdomen: soft, non-tender, non-distended, normal bowel sounds, no masses or organomegaly  Extremities: no cyanosis, no clubbing and no edema  Neurologic: no cranial nerve deficit and speech normal        Recent Labs     01/11/21  2248 01/12/21  0120 01/12/21  0415 01/12/21  0515     --  134  --    K 5.0  --  6.4* 4.1     --  102  --    CO2 7*  --  14*  --    BUN 27*  --  24*  --    CREATININE 0.7  --  0.6  --    GLUCOSE 522* 535* 263*  --    CALCIUM 8.5*  --  9.7  --        Recent Labs 01/11/21  2248 01/12/21  0415   WBC 10.1 12.4*   RBC 5.20 5.50   HGB 13.2 13.7   HCT 42.5 44.8   MCV 81.7 81.5   MCH 25.4* 24.9*   MCHC 31.1* 30.6*   RDW 16.5* 16.5*    396   MPV 11.6 11.1       Assessment:    Principal Problem:    DKA, type 1, not at goal [facility])  Active Problems:    Personal history of noncompliance with medical treatment, presenting hazards to health    Elevated lactic acid level    Severe dehydration    Nausea, vomiting and diarrhea  Resolved Problems:    * No resolved hospital problems. *      Plan:  1. DKA type I, presented with blood glucose of 500s, anion gap 25 bicarb 7 with elevated beta hydroxybutyrate, start the patient on IV fluids and insulin drip, anion gap is still open,  2. Abdominal pain, might be related to DKA yet CT scan on previous admission suggested gastroparesis or SMA syndrome, surgery was consulted, negative gastric emptying study and negative HIDA, surgery suggested gastric dilatation and possible CorPak placement. 3.  Diabetes type 1, not controlled despite the patient claiming that she does not miss her insulin, A1c 3 months ago was 11.2,when DKA resolved start 14 units of lantus and 4 units of lispro, consult endo   4. Hyponatremia, most likely pseudohyponatremia due to hyperglycemia resolved with correction of DKA. 5. Hypotension and tachycardia, dehydration, improved with fluids      NOTE: This report was transcribed using voice recognition software. Every effort was made to ensure accuracy; however, inadvertent computerized transcription errors may be present.   Electronically signed by Meghan Marquez MD on 1/12/2021 at 7:55 AM

## 2021-01-12 NOTE — PATIENT CARE CONFERENCE
Intensive Care Daily Quality Rounding Checklist      ICU Team Members: Bedside Nurse, Dr Sonu Ross    ICU Day #: NUMBER: [id number]    Intubation Date:     Ventilator Day #:     Central Line Insertion Date:         Day #:      Arterial Line Insertion Date:       Day #:     Temporary Hemodialysis Catheter Insertion Date:       Day #     DVT Prophylaxis:    GI Prophylaxis:    Eby Catheter Insertion Date:        Day #:       Continued need (if yes, reason documented and discussed with physician): N    Skin Issues/ Wounds and ordered treatment discussed on rounds:N    Goals/ Plans for the Day: bridge off of gtt once gap is closed,

## 2021-01-12 NOTE — PROGRESS NOTES
Patient admitted fromER to 208, with the following belongings cell phone, , wallet, purse $57, 5 credit cards, slippers, robe, 2 necklaces, earrings, car keys placed on monitor, patient oriented to room and unit visiting hours. Patient guide at bedside, reviewed patient rights and responsibilities. MRSA nasal swab obtained. Bed alarm on. Call light within reach.  Brad Awan

## 2021-01-13 LAB
ANION GAP SERPL CALCULATED.3IONS-SCNC: 8 MMOL/L (ref 7–16)
BUN BLDV-MCNC: 12 MG/DL (ref 6–20)
CALCIUM SERPL-MCNC: 9.1 MG/DL (ref 8.6–10.2)
CHLORIDE BLD-SCNC: 98 MMOL/L (ref 98–107)
CO2: 24 MMOL/L (ref 22–29)
CREAT SERPL-MCNC: 0.6 MG/DL (ref 0.5–1)
GFR AFRICAN AMERICAN: >60
GFR NON-AFRICAN AMERICAN: >60 ML/MIN/1.73
GLUCOSE BLD-MCNC: 424 MG/DL (ref 74–99)
MAGNESIUM: 1.9 MG/DL (ref 1.6–2.6)
METER GLUCOSE: 125 MG/DL (ref 74–99)
METER GLUCOSE: 164 MG/DL (ref 74–99)
METER GLUCOSE: 239 MG/DL (ref 74–99)
METER GLUCOSE: 273 MG/DL (ref 74–99)
METER GLUCOSE: 377 MG/DL (ref 74–99)
PHOSPHORUS: 3.2 MG/DL (ref 2.5–4.5)
POTASSIUM SERPL-SCNC: 4.4 MMOL/L (ref 3.5–5)
SODIUM BLD-SCNC: 130 MMOL/L (ref 132–146)

## 2021-01-13 PROCEDURE — 99232 SBSQ HOSP IP/OBS MODERATE 35: CPT | Performed by: INTERNAL MEDICINE

## 2021-01-13 PROCEDURE — 36415 COLL VENOUS BLD VENIPUNCTURE: CPT

## 2021-01-13 PROCEDURE — 6370000000 HC RX 637 (ALT 250 FOR IP): Performed by: INTERNAL MEDICINE

## 2021-01-13 PROCEDURE — 84100 ASSAY OF PHOSPHORUS: CPT

## 2021-01-13 PROCEDURE — 83735 ASSAY OF MAGNESIUM: CPT

## 2021-01-13 PROCEDURE — 1200000000 HC SEMI PRIVATE

## 2021-01-13 PROCEDURE — 80048 BASIC METABOLIC PNL TOTAL CA: CPT

## 2021-01-13 PROCEDURE — 6370000000 HC RX 637 (ALT 250 FOR IP): Performed by: EMERGENCY MEDICINE

## 2021-01-13 PROCEDURE — 82962 GLUCOSE BLOOD TEST: CPT

## 2021-01-13 RX ORDER — CYCLOBENZAPRINE HCL 10 MG
10 TABLET ORAL 3 TIMES DAILY PRN
Status: DISCONTINUED | OUTPATIENT
Start: 2021-01-13 | End: 2021-01-14 | Stop reason: HOSPADM

## 2021-01-13 RX ADMIN — CYCLOBENZAPRINE 10 MG: 10 TABLET, FILM COATED ORAL at 21:11

## 2021-01-13 RX ADMIN — CYCLOBENZAPRINE 10 MG: 10 TABLET, FILM COATED ORAL at 12:13

## 2021-01-13 RX ADMIN — INSULIN LISPRO 10 UNITS: 100 INJECTION, SOLUTION INTRAVENOUS; SUBCUTANEOUS at 06:52

## 2021-01-13 RX ADMIN — INSULIN LISPRO 4 UNITS: 100 INJECTION, SOLUTION INTRAVENOUS; SUBCUTANEOUS at 12:05

## 2021-01-13 RX ADMIN — INSULIN LISPRO 4 UNITS: 100 INJECTION, SOLUTION INTRAVENOUS; SUBCUTANEOUS at 16:33

## 2021-01-13 RX ADMIN — ACETAMINOPHEN 650 MG: 325 TABLET ORAL at 21:11

## 2021-01-13 RX ADMIN — INSULIN LISPRO 4 UNITS: 100 INJECTION, SOLUTION INTRAVENOUS; SUBCUTANEOUS at 12:03

## 2021-01-13 RX ADMIN — INSULIN LISPRO 4 UNITS: 100 INJECTION, SOLUTION INTRAVENOUS; SUBCUTANEOUS at 08:37

## 2021-01-13 RX ADMIN — INSULIN LISPRO 2 UNITS: 100 INJECTION, SOLUTION INTRAVENOUS; SUBCUTANEOUS at 16:31

## 2021-01-13 RX ADMIN — INSULIN GLARGINE 14 UNITS: 100 INJECTION, SOLUTION SUBCUTANEOUS at 08:37

## 2021-01-13 ASSESSMENT — PAIN DESCRIPTION - LOCATION: LOCATION: BACK

## 2021-01-13 ASSESSMENT — PAIN DESCRIPTION - FREQUENCY: FREQUENCY: INTERMITTENT

## 2021-01-13 ASSESSMENT — PAIN DESCRIPTION - PROGRESSION: CLINICAL_PROGRESSION: NOT CHANGED

## 2021-01-13 ASSESSMENT — PAIN - FUNCTIONAL ASSESSMENT: PAIN_FUNCTIONAL_ASSESSMENT: ACTIVITIES ARE NOT PREVENTED

## 2021-01-13 ASSESSMENT — PAIN DESCRIPTION - DESCRIPTORS: DESCRIPTORS: ACHING

## 2021-01-13 ASSESSMENT — PAIN DESCRIPTION - ORIENTATION: ORIENTATION: MID;LOWER

## 2021-01-13 NOTE — PROGRESS NOTES
[facility] Progress Note    Admitting Date and Time: 1/11/2021 10:43 PM  Admit Dx: DKA, type 1, not at goal [facility]) [E10.10]    Subjective:  Patient is being followed for DKA, type 1, not at goal [facility]) [E10.10]   Pt feels thirsty, and tired having back pain    ROS: denies fever, chills, cp, sob, n/v, HA unless stated above.       insulin lispro  0-12 Units Subcutaneous TID WC    insulin lispro  0-6 Units Subcutaneous Nightly    insulin glargine  14 Units Subcutaneous QAM    insulin lispro  4 Units Subcutaneous TID WC         dextrose, 12.5 g, PRN      ondansetron, 4 mg, Q6H PRN      acetaminophen, 650 mg, Q6H PRN      glucose, 15 g, PRN      dextrose, 12.5 g, PRN      glucagon (rDNA), 1 mg, PRN      dextrose, 100 mL/hr, PRN         Objective:    /78   Pulse 110   Temp 98.2 °F (36.8 °C) (Oral)   Resp 18   Ht 5' 1\" (1.549 m)   Wt 89 lb (40.4 kg)   SpO2 98%   BMI 16.82 kg/m²     General Appearance: alert and oriented to person, place and time and in no acute distress  Skin: warm and dry  Head: normocephalic and atraumatic  Eyes: pupils equal, round, and reactive to light, extraocular eye movements intact, conjunctivae normal  Neck: neck supple and non tender without mass   Pulmonary/Chest: clear to auscultation bilaterally- no wheezes, rales or rhonchi, normal air movement, no respiratory distress  Cardiovascular: normal rate, normal S1 and S2 and no carotid bruits  Abdomen: soft, non-tender, non-distended, normal bowel sounds, no masses or organomegaly  Extremities: no cyanosis, no clubbing and no edema  Neurologic: no cranial nerve deficit and speech normal        Recent Labs     01/12/21  0800 01/12/21  1216 01/13/21  0418    135 130*   K 3.9 4.6 4.4   * 107 98   CO2 19* 19* 24   BUN 18 14 12   CREATININE 0.5 0.5 0.6   GLUCOSE 141* 217* 424*   CALCIUM 8.7 8.3* 9.1       Recent Labs     01/11/21  2248 01/12/21  0415   WBC 10.1 12.4*   RBC 5.20 5.50   HGB 13.2 13.7 HCT 42.5 44.8   MCV 81.7 81.5   MCH 25.4* 24.9*   MCHC 31.1* 30.6*   RDW 16.5* 16.5*    396   MPV 11.6 11.1       Assessment:    Principal Problem:    DKA, type 1, not at goal [facility])  Active Problems:    Personal history of noncompliance with medical treatment, presenting hazards to health    Elevated lactic acid level    Severe dehydration    Nausea, vomiting and diarrhea  Resolved Problems:    * No resolved hospital problems. *      Plan:  1. DKA type I, presented with blood glucose of 500s, anion gap 25 bicarb 7 with elevated beta hydroxybutyrate, treated with IV fluids and insulin drip, anion gap closed, DKA resolved  2. Abdominal pain, might be related to DKA yet CT scan on previous admission suggested gastroparesis or SMA syndrome, surgery was consulted, negative gastric emptying study and negative HIDA, surgery suggested gastric dilatation and possible CorPak placement. 3.  Diabetes type 1, not controlled despite the patient claiming that she does not miss her insulin, A1c 3 months ago was 11.2,DKA resolved started 14 units of lantus and 4 units of lispro, appreciate endo input, need to adjust insulin, patient is still hyperglycemic int he 300's   4. Hyponatremia, most likely pseudohyponatremia due to hyperglycemia resolved with correction of DKA. 5. Hypotension and tachycardia, dehydration, improved with fluids  6. Back pain, start flexeril       NOTE: This report was transcribed using voice recognition software. Every effort was made to ensure accuracy; however, inadvertent computerized transcription errors may be present.   Electronically signed by Hyun Hernandez MD on 1/13/2021 at 9:52 AM

## 2021-01-13 NOTE — PLAN OF CARE
Problem: Discharge Planning:  Goal: Discharged to appropriate level of care  Description: Discharged to appropriate level of care  1/13/2021 1543 by Niko Goins RN  Outcome: Not Met This Shift     Problem: Fluid Volume - Imbalance:  Goal: Will remain free of signs and symptoms of dehydration  Description: Will remain free of signs and symptoms of dehydration  1/13/2021 1543 by Niko Goins RN  Outcome: Met This Shift     Problem: Fluid Volume - Imbalance:  Goal: Absence of imbalanced fluid volume signs and symptoms  Description: Absence of imbalanced fluid volume signs and symptoms  1/13/2021 1543 by Niko Goins RN  Outcome: Met This Shift     Problem: Serum Glucose Level - Abnormal:  Goal: Ability to maintain appropriate glucose levels will improve  Description: Ability to maintain appropriate glucose levels will improve  1/13/2021 1543 by Niko Goins RN  Outcome: Met This Shift     Problem: Injury - Acid Base Imbalance:  Goal: Acid-base balance  Description: Acid-base balance  1/13/2021 1543 by Niko Goins RN  Outcome: Met This Shift     Problem: Pain:  Goal: Pain level will decrease  Description: Pain level will decrease  1/13/2021 1543 by Niko Goins RN  Outcome: Met This Shift     Problem: Pain:  Goal: Control of acute pain  Description: Control of acute pain  1/13/2021 1543 by Niko Goins RN  Outcome: Met This Shift     Problem: Pain:  Goal: Control of chronic pain  Description: Control of chronic pain  1/13/2021 1543 by Niko Goins RN  Outcome: Met This Shift

## 2021-01-13 NOTE — PROGRESS NOTES
ENDOCRINOLOGY PROGRESS NOTE       Date of admission: 1/11/2021  Date of service: 1/13/2021  Admitting physician: Elvie Kruse MD   Primary Care Physician: Rosetta Warner DO  Consultant physician: Lisa Colon MD     Reason for the consultation:  DM type 1 admitted with DKA     History of Present Illness:  Services were provided through a video synchronous discussion     Stephen Brunner is a 23 y.o. old female with PMH of DM type 1 admitted to [facility] on 1/11/2021 because of abdomina pain, nausea and vomiting and found to be in DKA, endocrine team was consulted for diabetes management. Subjective   Seen and examined this AM, BS was high this AM but she ate late last night without insulin coverage     Inpatient diet:   Carb Restricted diet     Point of care glucose monitoring (Independently reviewed)   Recent Labs     01/12/21  1220 01/12/21  1354 01/12/21  1602 01/12/21  2028 01/13/21  0648 01/13/21  0840 01/13/21  1203 01/13/21  1540   GLUMET 196* 203* 266* 320* 377* 273* 239* 164*     Scheduled Meds:   insulin lispro  0-12 Units Subcutaneous TID WC    insulin lispro  0-6 Units Subcutaneous Nightly    insulin glargine  14 Units Subcutaneous QAM    insulin lispro  4 Units Subcutaneous TID WC     PRN Meds:       cyclobenzaprine, 10 mg, TID PRN      dextrose, 12.5 g, PRN      ondansetron, 4 mg, Q6H PRN      acetaminophen, 650 mg, Q6H PRN      glucose, 15 g, PRN      dextrose, 12.5 g, PRN      glucagon (rDNA), 1 mg, PRN      dextrose, 100 mL/hr, PRN      Continuous Infusions:   dextrose         Review of Systems  All systems reviewed.  All negative except for symptoms mentioned in HPI     OBJECTIVE    /78   Pulse 110   Temp 98.2 °F (36.8 °C) (Oral)   Resp 18   Ht 5' 1\" (1.549 m)   Wt 89 lb (40.4 kg)   SpO2 98%   BMI 16.82 kg/m²   No intake or output data in the 24 hours ending 01/13/21 1624    Physical examination:  General: awake alert, oriented x3, no abnormal position or movements. HEENT: normocephalic non traumatic, no exophthalmos   Neck: supple, no LN enlargement, no thyromegaly, no thyroid tenderness, no JVD. Pulm: Clear equal air entry no added sounds, no wheezing or rhonchi    CVS: S1 + S2, no murmur, no heave. Dorsalis pedis pulse palpable   Abd: soft lax, mild tenderness, no organomegaly, audible bowel sounds. Skin: warm, no lesions, no rash. no Ulcers, no open wounds   Neuro: CN intact, muscle power normal  Psych: normal mood, and affect    Review of Laboratory Data:  I personally reviewed the following labs:   Recent Labs     01/11/21 2248 01/12/21 0415   WBC 10.1 12.4*   RBC 5.20 5.50   HGB 13.2 13.7   HCT 42.5 44.8   MCV 81.7 81.5   MCH 25.4* 24.9*   MCHC 31.1* 30.6*   RDW 16.5* 16.5*    396   MPV 11.6 11.1     Recent Labs     01/11/21 2248 01/11/21 2248 01/12/21  0800 01/12/21  1216 01/13/21  0418      < > 139 135 130*   K 5.0   < > 3.9 4.6 4.4      < > 110* 107 98   CO2 7*   < > 19* 19* 24   BUN 27*   < > 18 14 12   CREATININE 0.7   < > 0.5 0.5 0.6   GLUCOSE 522*   < > 141* 217* 424*   CALCIUM 8.5*   < > 8.7 8.3* 9.1   PROT 7.6  --   --   --   --    LABALBU 4.0  --   --   --   --    BILITOT 0.3  --   --   --   --    ALKPHOS 92  --   --   --   --    AST 30  --   --   --   --    ALT 37*  --   --   --   --     < > = values in this interval not displayed.      Beta-Hydroxybutyrate   Date Value Ref Range Status   01/11/2021 >4.50 (H) 0.02 - 0.27 mmol/L Final   12/14/2020 >4.50 (H) 0.02 - 0.27 mmol/L Final   11/10/2020 2.21 (H) 0.02 - 0.27 mmol/L Final     Lab Results   Component Value Date    LABA1C 11.2 10/18/2020    LABA1C 13.0 05/30/2020    LABA1C 15.9 03/09/2020     No results found for: TSH, T4FREE, Y8LKQLS, FT3, K3UVIZL, TSI, TPOABS, THGAB  Lab Results   Component Value Date    LABA1C 11.2 10/18/2020    GLUCOSE 424 01/13/2021    MALBCR 204.6 01/23/2018    LABMICR 102.3 01/23/2018    LABCREA 12 10/18/2020     Lab Results   Component Value Date    TRIG 106 01/24/2018    HDL 40 01/24/2018    LDLCALC 114 01/24/2018    CHOL 175 01/24/2018       Blood culture   Lab Results   Component Value Date    BC 5 Days no growth 11/10/2020    BC 5 Days no growth 11/01/2020       Radiology:  No orders to display       Medical Records/Labs/Images review:   I personally reviewed and summarized previous records   All labs and imaging were reviewed independently     Bob Espinoza, a 23 y.o.-old female seen today for inpatient diabetes management     Brittle type 1 DM admitted with DKA   · Patient's DM is very brittle and poorly controlled  due to poor compliance with diet and BS checking   · BS was high this AM because she ate late last night without insulin coverage. · We recommend the following DM regimen   · Change Lantus 18 units every morning   · Humalog 5 units with meals   · Low dose sliding scale     · Glucose check before meals and at bed time   · Will titrate insulin dose based on blood glucose trend & insulin requirement    Abdominal pain/n/v  · No clear etiology. Previous work up showed normal Gastric empty study and HIDA scan     The above issues were reviewed with the patient who understood and agreed with the plan. Thank you for allowing us to participate in the care of this patient. Please do not hesitate to contact us with any additional questions. Ibrahima Chapman MD  Endocrinologist, [facility] Diabetes Care and Endocrinology   [address] [place] [id number]   Phone: [phone]  Fax: [phone]  --------------------------------------------  An electronic signature was used to authenticate this note.  Vicki Cuevas MD on 1/13/2021 at 4:24 PM

## 2021-01-13 NOTE — CONSULTS
ENDOCRINOLOGY INITIAL CONSULTATION NOTE       Date of admission: 1/11/2021  Date of service: 1/12/2021  Admitting physician: Anson Ramírez MD   Primary Care Physician: Brice Camilo DO  Consultant physician: Tina Jimenez MD     Reason for the consultation:  DM type 1 admitted with DKA     History of Present Illness:  Services were provided through a video synchronous discussion     Ankit Tran is a 23 y.o. old female with PMH of DM type 1 admitted to [facility] on 1/11/2021 because of abdomina pain, nausea and vomiting and found to be in DKA, endocrine team was consulted for diabetes management. Pt had EGD in February/2020 which was normal. Gastric emptying study done in September 2020 showed normal and uniform gastric emptying. A HIDA performed in September 2020 was negative as well.       The patient has been in and out of the hospital frequently with DKA due to noncompliance with diabetic management. The patient denied cough, fever, chills, chest pain or SOB    Up on arrival to ED, BS >522, AG 25 , Cr 0.7, Bicarb 7, K 5.0     Prior to admission  Type 1 DM was diagnosed at the age 8. Prior to admission, she was basaglar 17 units daily, Humalog with meals using carb ratio 1u:10g  + ss 1:50>150. The patient was also on insulin pump I the past.she was checking checks BS 2-3 times a day and self-blood glucose monitoring has been highly variable prior to admission. She is due for annual eye exam but denied any history of diabetic retinopathy.   The patient performs her own feet care and doesn't see podiatry service  Microvascular complications:  No Retinopathy, Nephropathy + Neuropathy   Macrovascular complications: no CAD, PVD, or Stroke    Lab Results   Component Value Date    LABA1C 11.2 10/18/2020     Inpatient diet:   Carb Restricted diet     Point of care glucose monitoring (Independently reviewed)   Recent Labs     01/12/21  0622 01/12/21  0800 01/12/21  0910 01/12/21  1006 01/12/21  1114 01/12/21  1220 01/12/21  1354 01/12/21  1602   GLUMET 133* 128* 145* 183* 169* 196* 203* 266*       Past medical history:   Past Medical History:   Diagnosis Date    Bleeding at insertion site 1/5/2018    Common femoral artery injury, right, initial encounter 1/5/2018    Depressive disorder     DM type 1 (diabetes mellitus, type 1) ([facility] [address]        Past surgical history:  Past Surgical History:   Procedure Laterality Date    ABDOMEN SURGERY N/A 5/9/2019    INCISION AND DRAINAGE OF SUPRAPUBIC ABSESS performed by Aimee Ellis MD at [address]      last yr       Social history:   Tobacco:   reports that she has never smoked. She has never used smokeless tobacco.  Alcohol:   reports no history of alcohol use. Drugs:   reports current drug use. Drug: Marijuana. Family history:    Family History   Problem Relation Age of Onset    Diabetes Maternal Grandmother     Diabetes Paternal Grandmother     Stroke Other     Thyroid Disease Neg Hx        Allergy and drug reactions:   No Known Allergies    Scheduled Meds:   insulin lispro  0-12 Units Subcutaneous TID WC    insulin lispro  0-6 Units Subcutaneous Nightly    [START ON 1/13/2021] insulin glargine  14 Units Subcutaneous QAM    [START ON 1/13/2021] insulin lispro  4 Units Subcutaneous TID WC     PRN Meds:       dextrose, 12.5 g, PRN      ondansetron, 4 mg, Q6H PRN      acetaminophen, 650 mg, Q6H PRN      glucose, 15 g, PRN      dextrose, 12.5 g, PRN      glucagon (rDNA), 1 mg, PRN      dextrose, 100 mL/hr, PRN      Continuous Infusions:   dextrose         Review of Systems  All systems reviewed.  All negative except for symptoms mentioned in HPI     OBJECTIVE    /79   Pulse 105   Temp 98 °F (36.7 °C) (Oral)   Resp 16   Ht 5' 1\" (1.549 m)   Wt 84 lb 10.5 oz (38.4 kg)   SpO2 100%   BMI 16.00 kg/m²     Intake/Output Summary (Last 24 hours) at 1/12/2021 1911  Last data filed at 1/12/2021 3621  Gross per 24 hour   Intake 904 ml   Output --   Net 904 ml       Physical examination:  General: awake alert, oriented x3, no abnormal position or movements. HEENT: normocephalic non traumatic, no exophthalmos   Neck: supple, no LN enlargement, no thyromegaly, no thyroid tenderness, no JVD. Pulm: Clear equal air entry no added sounds, no wheezing or rhonchi    CVS: S1 + S2, no murmur, no heave. Dorsalis pedis pulse palpable   Abd: soft lax, mild tenderness, no organomegaly, audible bowel sounds. Skin: warm, no lesions, no rash. no Ulcers, no open wounds   Neuro: CN intact, muscle power normal  Psych: normal mood, and affect    Review of Laboratory Data:  I personally reviewed the following labs:   Recent Labs     01/11/21 2248 01/12/21 0415   WBC 10.1 12.4*   RBC 5.20 5.50   HGB 13.2 13.7   HCT 42.5 44.8   MCV 81.7 81.5   MCH 25.4* 24.9*   MCHC 31.1* 30.6*   RDW 16.5* 16.5*    396   MPV 11.6 11.1     Recent Labs     01/11/21 2248 01/11/21 2248 01/12/21  0415 01/12/21  0515 01/12/21  0800 01/12/21  1216     --  134  --  139 135   K 5.0   < > 6.4* 4.1 3.9 4.6     --  102  --  110* 107   CO2 7*  --  14*  --  19* 19*   BUN 27*  --  24*  --  18 14   CREATININE 0.7  --  0.6  --  0.5 0.5   GLUCOSE 522*   < > 263*  --  141* 217*   CALCIUM 8.5*  --  9.7  --  8.7 8.3*   PROT 7.6  --   --   --   --   --    LABALBU 4.0  --   --   --   --   --    BILITOT 0.3  --   --   --   --   --    ALKPHOS 92  --   --   --   --   --    AST 30  --   --   --   --   --    ALT 37*  --   --   --   --   --     < > = values in this interval not displayed.      Beta-Hydroxybutyrate   Date Value Ref Range Status   01/11/2021 >4.50 (H) 0.02 - 0.27 mmol/L Final   12/14/2020 >4.50 (H) 0.02 - 0.27 mmol/L Final   11/10/2020 2.21 (H) 0.02 - 0.27 mmol/L Final     Lab Results   Component Value Date    LABA1C 11.2 10/18/2020    LABA1C 13.0 05/30/2020    LABA1C 15.9 03/09/2020     No results found for: TSH, T4FREE, O8SOSPA, FT3, N6RSLLO, TSI, TPOABS, THGAB  Lab Results   Component Value Date    LABA1C 11.2 10/18/2020    GLUCOSE 217 01/12/2021    MALBCR 204.6 01/23/2018    LABMICR 102.3 01/23/2018    LABCREA 12 10/18/2020     Lab Results   Component Value Date    TRIG 106 01/24/2018    HDL 40 01/24/2018    LDLCALC 114 01/24/2018    CHOL 175 01/24/2018       Blood culture   Lab Results   Component Value Date    BC 5 Days no growth 11/10/2020    BC 5 Days no growth 11/01/2020       Radiology:  No orders to display       Medical Records/Labs/Images review:   I personally reviewed and summarized previous records   All labs and imaging were reviewed independently     Bob Espinoza, a 23 y.o.-old female seen today for inpatient diabetes management     Brittle type 1 DM admitted with DKA   · Patient's DM is very brittle and poorly controlled  due to poor compliance with diet and BS checking   · Received Lantus 20 units this AM   · We recommend the following DM regimen   · Change Lantus 14 units every morning   · Humalog 4 units with meals   · Low dose sliding scale     · Glucose check before meals and at bed time   · Will titrate insulin dose based on blood glucose trend & insulin requirement    Abdominal pain/n/v  · No clear etiology. Previous work up showed normal Gastric empty study and HIDA scan     Previous COVID    · Recent test negative     The above issues were reviewed with the patient who understood and agreed with the plan. Thank you for allowing us to participate in the care of this patient. Please do not hesitate to contact us with any additional questions. Becki Slade MD  Endocrinologist, [facility]   [address] [place] [id number]   Phone: [phone]  Fax: [phone]  --------------------------------------------  An electronic signature was used to authenticate this note.  Liz Do MD on 1/12/2021 at 7:11 PM

## 2021-01-14 VITALS
OXYGEN SATURATION: 99 % | RESPIRATION RATE: 16 BRPM | DIASTOLIC BLOOD PRESSURE: 83 MMHG | HEART RATE: 107 BPM | SYSTOLIC BLOOD PRESSURE: 128 MMHG | BODY MASS INDEX: 17.18 KG/M2 | WEIGHT: 91 LBS | HEIGHT: 61 IN | TEMPERATURE: 97.9 F

## 2021-01-14 LAB
ANION GAP SERPL CALCULATED.3IONS-SCNC: 13 MMOL/L (ref 7–16)
BUN BLDV-MCNC: 11 MG/DL (ref 6–20)
CALCIUM SERPL-MCNC: 10 MG/DL (ref 8.6–10.2)
CHLORIDE BLD-SCNC: 102 MMOL/L (ref 98–107)
CO2: 23 MMOL/L (ref 22–29)
CREAT SERPL-MCNC: 0.5 MG/DL (ref 0.5–1)
GFR AFRICAN AMERICAN: >60
GFR NON-AFRICAN AMERICAN: >60 ML/MIN/1.73
GLUCOSE BLD-MCNC: 214 MG/DL (ref 74–99)
MAGNESIUM: 1.8 MG/DL (ref 1.6–2.6)
METER GLUCOSE: 131 MG/DL (ref 74–99)
METER GLUCOSE: 240 MG/DL (ref 74–99)
METER GLUCOSE: 344 MG/DL (ref 74–99)
PHOSPHORUS: 3.5 MG/DL (ref 2.5–4.5)
POTASSIUM SERPL-SCNC: 3.6 MMOL/L (ref 3.5–5)
SODIUM BLD-SCNC: 138 MMOL/L (ref 132–146)

## 2021-01-14 PROCEDURE — 83735 ASSAY OF MAGNESIUM: CPT

## 2021-01-14 PROCEDURE — 84100 ASSAY OF PHOSPHORUS: CPT

## 2021-01-14 PROCEDURE — APPSS45 APP SPLIT SHARED TIME 31-45 MINUTES: Performed by: NURSE PRACTITIONER

## 2021-01-14 PROCEDURE — 36415 COLL VENOUS BLD VENIPUNCTURE: CPT

## 2021-01-14 PROCEDURE — 80048 BASIC METABOLIC PNL TOTAL CA: CPT

## 2021-01-14 PROCEDURE — 6370000000 HC RX 637 (ALT 250 FOR IP): Performed by: INTERNAL MEDICINE

## 2021-01-14 PROCEDURE — 82962 GLUCOSE BLOOD TEST: CPT

## 2021-01-14 PROCEDURE — 99238 HOSP IP/OBS DSCHRG MGMT 30/<: CPT | Performed by: INTERNAL MEDICINE

## 2021-01-14 RX ORDER — INSULIN GLARGINE 100 [IU]/ML
18 INJECTION, SOLUTION SUBCUTANEOUS EVERY MORNING
Qty: 1 VIAL | Refills: 3 | Status: ON HOLD | OUTPATIENT
Start: 2021-01-15 | End: 2021-02-24 | Stop reason: HOSPADM

## 2021-01-14 RX ORDER — INSULIN GLARGINE 100 [IU]/ML
18 INJECTION, SOLUTION SUBCUTANEOUS EVERY MORNING
Status: DISCONTINUED | OUTPATIENT
Start: 2021-01-14 | End: 2021-01-14 | Stop reason: HOSPADM

## 2021-01-14 RX ADMIN — INSULIN LISPRO 5 UNITS: 100 INJECTION, SOLUTION INTRAVENOUS; SUBCUTANEOUS at 16:32

## 2021-01-14 RX ADMIN — INSULIN LISPRO 4 UNITS: 100 INJECTION, SOLUTION INTRAVENOUS; SUBCUTANEOUS at 06:40

## 2021-01-14 RX ADMIN — ACETAMINOPHEN 650 MG: 325 TABLET ORAL at 02:54

## 2021-01-14 RX ADMIN — INSULIN LISPRO 2 UNITS: 100 INJECTION, SOLUTION INTRAVENOUS; SUBCUTANEOUS at 16:32

## 2021-01-14 RX ADMIN — INSULIN LISPRO 4 UNITS: 100 INJECTION, SOLUTION INTRAVENOUS; SUBCUTANEOUS at 06:42

## 2021-01-14 RX ADMIN — INSULIN GLARGINE 18 UNITS: 100 INJECTION, SOLUTION SUBCUTANEOUS at 08:33

## 2021-01-14 RX ADMIN — INSULIN LISPRO 5 UNITS: 100 INJECTION, SOLUTION INTRAVENOUS; SUBCUTANEOUS at 11:45

## 2021-01-14 RX ADMIN — ACETAMINOPHEN 650 MG: 325 TABLET ORAL at 17:36

## 2021-01-14 ASSESSMENT — PAIN DESCRIPTION - ONSET
ONSET: ON-GOING
ONSET: ON-GOING

## 2021-01-14 ASSESSMENT — PAIN DESCRIPTION - LOCATION
LOCATION: BACK
LOCATION: BACK

## 2021-01-14 ASSESSMENT — PAIN DESCRIPTION - ORIENTATION
ORIENTATION: MID;LOWER
ORIENTATION: MID;LOWER

## 2021-01-14 ASSESSMENT — PAIN DESCRIPTION - FREQUENCY
FREQUENCY: CONTINUOUS
FREQUENCY: INTERMITTENT

## 2021-01-14 ASSESSMENT — PAIN SCALES - GENERAL
PAINLEVEL_OUTOF10: 7
PAINLEVEL_OUTOF10: 7

## 2021-01-14 ASSESSMENT — PAIN DESCRIPTION - DESCRIPTORS: DESCRIPTORS: SHARP

## 2021-01-14 ASSESSMENT — PAIN DESCRIPTION - PROGRESSION
CLINICAL_PROGRESSION: NOT CHANGED
CLINICAL_PROGRESSION: NOT CHANGED

## 2021-01-14 ASSESSMENT — PAIN DESCRIPTION - PAIN TYPE: TYPE: ACUTE PAIN

## 2021-01-14 NOTE — PATIENT CARE CONFERENCE
P Quality Flow/Interdisciplinary Rounds Progress Note        Quality Flow Rounds held on January 14, 2021    Disciplines Attending:  Bedside Nurse, ,  and Nursing Unit Leadership    Verena Crawford was admitted on 1/11/2021 10:43 PM    Anticipated Discharge Date:  Expected Discharge Date: 01/14/21    Disposition:    Gavino Score:  Gavino Scale Score: 22    Readmission Risk              Risk of Unplanned Readmission:        53           Discussed patient goal for the day, patient clinical progression, and barriers to discharge.   The following Goal(s) of the Day/Commitment(s) have been identified:  Discharge - Obtain Order      Francesco Ibrahim  January 14, 2021

## 2021-01-14 NOTE — PLAN OF CARE
Problem: Discharge Planning:  Goal: Discharged to appropriate level of care  Description: Discharged to appropriate level of care  1/13/2021 2342 by Loren Bodily  Outcome: Ongoing  1/13/2021 1543 by Tish Pena RN  Outcome: Not Met This Shift

## 2021-01-14 NOTE — DISCHARGE SUMMARY
[facility] Physician Discharge Summary       Tonny Multani,   [address] [place] [address]  [phone]    Schedule an appointment as soon as possible for a visit in 1 week  Please call for follow up post hospital stay appt. Marielle Mak, Josef R[address] [facility] [address]  [phone]    Schedule an appointment as soon as possible for a visit in 1 week  Please call for follow up post hospital stay appt. Activity level: As tolerated      Diet: DIET CARB CONTROL; Carb Control: 3 carb choices (45 gms)/meal      Condition at discharge: Stable    Dispo:Home          Patient ID:  Bharath Berry  [id number]  23 y.o.  1995    Admit date: 1/11/2021    Discharge date and time:  1/14/2021  2:22 PM    Admission Diagnoses: Principal Problem:    DKA, type 1, not at goal [facility])  Active Problems:    Personal history of noncompliance with medical treatment, presenting hazards to health    Elevated lactic acid level    Severe dehydration    Nausea, vomiting and diarrhea  Resolved Problems:    * No resolved hospital problems. *      Discharge Diagnoses: Principal Problem:    DKA, type 1, not at goal [facility])  Active Problems:    Personal history of noncompliance with medical treatment, presenting hazards to health    Elevated lactic acid level    Severe dehydration    Nausea, vomiting and diarrhea  Resolved Problems:    * No resolved hospital problems. *      Consults:  IP CONSULT TO CRITICAL CARE  IP CONSULT TO ENDOCRINOLOGY    Procedures: None    Hospital Course:     3612/2021 26-year-old female with PMH DM1 poorly controlled with recurrent admissions for DKA of over the last few months presented to [facility] ED with complaints of N/V and abdominal pain. Patient noted to be in DKA on admission. She was started on insulin drip and admitted to the ICU. critical care and endocrinology consulted on the case. Blood glucose 500s anion gap 25 bicarb 7.   Elevated 5. 20 5.50   HGB 13.2 13.7   HCT 42.5 44.8   MCV 81.7 81.5   MCH 25.4* 24.9*   MCHC 31.1* 30.6*   RDW 16.5* 16.5*    396   MPV 11.6 11.1       No results for input(s): POCGLU in the last 72 hours. Imaging:  No results found. Patient Instructions:   Current Discharge Medication List      START taking these medications    Details   insulin glargine (LANTUS) 100 UNIT/ML injection vial Inject 18 Units into the skin every morning  Qty: 1 vial, Refills: 3      !! insulin lispro (HUMALOG) 100 UNIT/ML injection vial Inject 0-3 Units into the skin nightly  Qty: 1 vial, Refills: 3      !! insulin lispro (HUMALOG) 100 UNIT/ML injection vial Inject 0-6 Units into the skin 3 times daily (with meals)  Qty: 1 vial, Refills: 3      !! insulin lispro (HUMALOG) 100 UNIT/ML injection vial Inject 5 Units into the skin 3 times daily (with meals)  Qty: 1 vial, Refills: 3       !! - Potential duplicate medications found. Please discuss with provider. STOP taking these medications       insulin aspart (NOVOLOG FLEXPEN) 100 UNIT/ML injection pen Comments:   Reason for Stopping:         insulin glargine (BASAGLAR KWIKPEN) 100 UNIT/ML injection pen Comments:   Reason for Stopping:         naproxen (NAPROSYN) 250 MG tablet Comments:   Reason for Stopping:         insulin aspart (NOVOLOG) 100 UNIT/ML injection vial Comments:   Reason for Stopping:                 Note that more than 30 minutes was spent in preparing discharge papers, discussing discharge with patient, medication review, etc.    NOTE: This report was transcribed using voice recognition software. Every effort was made to ensure accuracy; however, inadvertent computerized transcription errors may be present. Signed:  Electronically signed by Gregoria Aguirre CNP on 1/14/2021 at 2:22 PM

## 2021-01-14 NOTE — PROGRESS NOTES
CLINICAL PHARMACY NOTE: MEDS TO [address] Select Patient?: No  Total # of Prescriptions Filled: 1   The following medications were delivered to the patient:  · Insulin lispro  Total # of Interventions Completed: 7  Time Spent (min): 60    Additional Documentation:

## 2021-02-02 ENCOUNTER — OFFICE VISIT (OUTPATIENT)
Dept: PRIMARY CARE CLINIC | Age: 26
End: 2021-02-02
Payer: COMMERCIAL

## 2021-02-02 VITALS
OXYGEN SATURATION: 98 % | TEMPERATURE: 97.4 F | HEIGHT: 61 IN | HEART RATE: 108 BPM | DIASTOLIC BLOOD PRESSURE: 64 MMHG | BODY MASS INDEX: 18.6 KG/M2 | WEIGHT: 98.5 LBS | SYSTOLIC BLOOD PRESSURE: 100 MMHG

## 2021-02-02 DIAGNOSIS — E10.65 UNCONTROLLED TYPE 1 DIABETES MELLITUS WITH HYPERGLYCEMIA (HCC): Primary | Chronic | ICD-10-CM

## 2021-02-02 PROCEDURE — 4004F PT TOBACCO SCREEN RCVD TLK: CPT | Performed by: INTERNAL MEDICINE

## 2021-02-02 PROCEDURE — G8427 DOCREV CUR MEDS BY ELIG CLIN: HCPCS | Performed by: INTERNAL MEDICINE

## 2021-02-02 PROCEDURE — G8484 FLU IMMUNIZE NO ADMIN: HCPCS | Performed by: INTERNAL MEDICINE

## 2021-02-02 PROCEDURE — 2022F DILAT RTA XM EVC RTNOPTHY: CPT | Performed by: INTERNAL MEDICINE

## 2021-02-02 PROCEDURE — 3046F HEMOGLOBIN A1C LEVEL >9.0%: CPT | Performed by: INTERNAL MEDICINE

## 2021-02-02 PROCEDURE — G8420 CALC BMI NORM PARAMETERS: HCPCS | Performed by: INTERNAL MEDICINE

## 2021-02-02 PROCEDURE — 1111F DSCHRG MED/CURRENT MED MERGE: CPT | Performed by: INTERNAL MEDICINE

## 2021-02-02 PROCEDURE — 99203 OFFICE O/P NEW LOW 30 MIN: CPT | Performed by: INTERNAL MEDICINE

## 2021-02-02 RX ORDER — INSULIN LISPRO 100 [IU]/ML
INJECTION, SOLUTION INTRAVENOUS; SUBCUTANEOUS
Status: ON HOLD | COMMUNITY
Start: 2021-01-25 | End: 2021-02-24 | Stop reason: SDUPTHER

## 2021-02-02 RX ORDER — TRAZODONE HYDROCHLORIDE 50 MG/1
TABLET ORAL
COMMUNITY
Start: 2021-01-25 | End: 2021-05-04

## 2021-02-02 RX ORDER — INSULIN ASPART 100 [IU]/ML
INJECTION, SOLUTION INTRAVENOUS; SUBCUTANEOUS
Status: ON HOLD | COMMUNITY
Start: 2021-01-26 | End: 2021-09-18 | Stop reason: HOSPADM

## 2021-02-02 RX ORDER — PEN NEEDLE, DIABETIC 32GX 5/32"
NEEDLE, DISPOSABLE MISCELLANEOUS
Status: ON HOLD | COMMUNITY
Start: 2021-01-27 | End: 2021-02-24 | Stop reason: SDUPTHER

## 2021-02-02 RX ORDER — INSULIN GLARGINE 100 [IU]/ML
INJECTION, SOLUTION SUBCUTANEOUS
Status: ON HOLD | COMMUNITY
Start: 2020-12-31 | End: 2021-02-24 | Stop reason: SDUPTHER

## 2021-02-02 NOTE — PROGRESS NOTES
2/2/21    Dai Foster, a female of 23 y.o. came to the office    HPI     Dai Foster presents today as a new patient. She was recently admitted to the hospital and discharged on January 14. At that time she was admitted for diabetic ketoacidosis and treated with IV insulin. She was seen by endocrinology. Her other medical history includes possible gastroparesis/SMA syndrome. She has been having multiple hospitalizations for uncontrolled DM. She uses 25 u of lantus at night and sliding scale. She has had DM for 12 years. Her glucose was 104 this morning. Her last hemoglobin a1c was over 14. She has been taking linzess for her bowels. She is going to have an upper endoscopy. She used to take gabapentin for neuropathy    Her chronic health problems other than stated above include insomnia. She takes trazodone for her insomnia    Her surgical history is significant for incision and drainage of a genital abscess in 2019    Her great grandfather is living at the age of 80 and has colon cancer. Her grandmother has multiple myeloma and is aged 47. She is currently not employed but used to work as a .  She denies any alcohol or tobacco abuse. She does smoke marijuana on a weekly basis. She states that she utilizes this for her back pain associated with constipation    No Known Allergies    Current Outpatient Medications on File Prior to Visit   Medication Sig Dispense Refill    NOVOLOG FLEXPEN 100 UNIT/ML injection pen PER SLIDING SCALE.  MAX OF 60 UNITS DAILY      BD PEN NEEDLE SHELBIE U/F 32G X 4 MM MISC USE AS DIRECTED UP TO 6 TIMES A DAY      linaclotide (LINZESS) 290 MCG CAPS capsule Take 290 mcg by mouth      traZODone (DESYREL) 50 MG tablet TAKE 1 1/2 TABLET BY MOUTH AT BEDTIME FOR SLEEP      insulin glargine (BASAGLAR KWIKPEN) 100 UNIT/ML injection pen INJECT 25 UNITS INTO THE SKIN NIGHTLY      insulin lispro, 1 Unit Dial, 100 UNIT/ML SOPN Inject 3 times daily before using carb ratrio of 1U:10G  Plus sliding scale max 50 units daily      insulin glargine (LANTUS) 100 UNIT/ML injection vial Inject 18 Units into the skin every morning (Patient taking differently: Inject 25 Units into the skin every morning ) 1 vial 3    insulin lispro (HUMALOG) 100 UNIT/ML injection vial Inject 0-3 Units into the skin nightly (Patient taking differently: Inject 0-3 Units into the skin 3 times daily (before meals) Sliding scale) 1 vial 3     No current facility-administered medications on file prior to visit. Review of Systems   Constitutional: Negative for activity change, appetite change, chills and fatigue. HENT: Negative for hearing loss, Negative for congestion. Respiratory: Negative for chest tightness, shortness of breath and wheezing. Cardiovascular: Negative for leg swelling Negative for chest pain and palpitations. Gastrointestinal: Negative for abdominal pain, Negative for abdominal distention, constipation and diarrhea. Genitourinary: Negative for difficulty urinating, dysuria and frequency. Musculoskeletal: Negative for arthralgias, back pain, gait problem and joint swelling. Skin: Negative for color change rash or wound     Neurological: Negative for dizziness, seizures and headaches. Hematological: Negative for adenopathy. Does not bruise/bleed easily. Psychiatric/Behavioral: Negative for agitation, behavioral problems, confusion and decreased concentration. OBJECTIVE:  /64   Pulse 108   Temp 97.4 °F (36.3 °C)   Ht 5' 1\" (1.549 m)   Wt 98 lb 8 oz (44.7 kg)   SpO2 98%   BMI 18.61 kg/m²      Physical Exam   Constitutional: Oriented to person, place, and time. Appears well-developed and well-nourished. No distress. HENT:   Head: Normocephalic and atraumatic. Eyes: Pupils are equal, round, and reactive to light. EOM are normal.   Neck: Neck supple. No JVD present. No tracheal deviation present. No thyromegaly present.    Cardiovascular: Normal rate, regular rhythm, normal heart sounds and intact distal pulses. Exam reveals no gallop and no friction rub. No murmur heard. Pulmonary/Chest: Effort normal and breath sounds normal. No stridor. No respiratory distress. No wheezes or rales. Abdominal: Soft. Bowel sounds are normal. There is no distension nor mass. There is no tenderness. There is no guarding. Musculoskeletal: No edema tenderness or deformity  Neurological: Alert and oriented to person, place, and time. No cranial nerve deficit. Normal muscle tone. Coordination normal.   Skin: Skin is warm and dry. No diaphoresis. No erythema. Psychiatric: Normal mood and affect. Behavior is normal.         ASSESSMENT AND PLAN:    Miquel Heath was seen today for new patient and establish care. Diagnoses and all orders for this visit:    Uncontrolled type 1 diabetes mellitus with hyperglycemia ([facility] [address]        Jamie Nunez presents today as a new patient. Longstanding uncontrolled diabetes    She is already starting to manifest some micro and macro vascular sequelae from uncontrolled diabetes    She admits to medication noncompliance    She states that her hemoglobin A1c for many years has been over 14, most recently it is 11.2    She continues to follow-up with endocrinology and optometry    She is following with gastroenterology for gastroparesis    Her bowels are much better since initiating Linzess    She utilizes trazodone to help her sleep    We discussed the trajectory of her care and some of the long-term sequelae of uncontrolled diabetes. We discussed her current specialist and she would like to continue her current management plan    We discussed her noncompliance, she denies any mood related effects as inhibitions to her care    I will see her back in 8 weeks for close follow-up to ensure medication compliance. Please note that the above documentation was prepared using voice recognition software.   Every attempt was made to ensure accuracy but there may be spelling, grammatical, and contextual errors. Return in about 3 months (around 5/2/2021).     Guille Paris, DO

## 2021-02-23 ENCOUNTER — HOSPITAL ENCOUNTER (INPATIENT)
Age: 26
LOS: 1 days | Discharge: HOME | DRG: 420 | End: 2021-02-24
Attending: EMERGENCY MEDICINE | Admitting: INTERNAL MEDICINE
Payer: COMMERCIAL

## 2021-02-23 ENCOUNTER — APPOINTMENT (OUTPATIENT)
Dept: CT IMAGING | Age: 26
DRG: 420 | End: 2021-02-23
Payer: COMMERCIAL

## 2021-02-23 DIAGNOSIS — K31.84 GASTROPARESIS: ICD-10-CM

## 2021-02-23 DIAGNOSIS — R10.9 ABDOMINAL PAIN, UNSPECIFIED ABDOMINAL LOCATION: ICD-10-CM

## 2021-02-23 DIAGNOSIS — E08.10 DIABETIC KETOACIDOSIS WITHOUT COMA ASSOCIATED WITH DIABETES MELLITUS DUE TO UNDERLYING CONDITION (HCC): Primary | ICD-10-CM

## 2021-02-23 DIAGNOSIS — E86.0 DEHYDRATION: ICD-10-CM

## 2021-02-23 LAB
ALBUMIN SERPL-MCNC: 4.7 G/DL (ref 3.5–5.2)
ALP BLD-CCNC: 153 U/L (ref 35–104)
ALT SERPL-CCNC: 53 U/L (ref 0–32)
ANION GAP SERPL CALCULATED.3IONS-SCNC: 27 MMOL/L (ref 7–16)
ANION GAP SERPL CALCULATED.3IONS-SCNC: 37 MMOL/L (ref 7–16)
AST SERPL-CCNC: 34 U/L (ref 0–31)
BASOPHILS ABSOLUTE: 0.05 E9/L (ref 0–0.2)
BASOPHILS RELATIVE PERCENT: 0.5 % (ref 0–2)
BETA-HYDROXYBUTYRATE: >4.5 MMOL/L (ref 0.02–0.27)
BILIRUB SERPL-MCNC: 0.4 MG/DL (ref 0–1.2)
BILIRUBIN URINE: NEGATIVE
BLOOD, URINE: NEGATIVE
BUN BLDV-MCNC: 25 MG/DL (ref 6–20)
BUN BLDV-MCNC: 26 MG/DL (ref 6–20)
CALCIUM SERPL-MCNC: 10.8 MG/DL (ref 8.6–10.2)
CALCIUM SERPL-MCNC: 9.2 MG/DL (ref 8.6–10.2)
CHLORIDE BLD-SCNC: 84 MMOL/L (ref 98–107)
CHLORIDE BLD-SCNC: 96 MMOL/L (ref 98–107)
CLARITY: CLEAR
CO2: 13 MMOL/L (ref 22–29)
CO2: 9 MMOL/L (ref 22–29)
COLOR: YELLOW
CREAT SERPL-MCNC: 0.8 MG/DL (ref 0.5–1)
CREAT SERPL-MCNC: 0.8 MG/DL (ref 0.5–1)
EOSINOPHILS ABSOLUTE: 0.01 E9/L (ref 0.05–0.5)
EOSINOPHILS RELATIVE PERCENT: 0.1 % (ref 0–6)
GFR AFRICAN AMERICAN: >60
GFR AFRICAN AMERICAN: >60
GFR NON-AFRICAN AMERICAN: >60 ML/MIN/1.73
GFR NON-AFRICAN AMERICAN: >60 ML/MIN/1.73
GLUCOSE BLD-MCNC: 365 MG/DL (ref 74–99)
GLUCOSE BLD-MCNC: 812 MG/DL (ref 74–99)
GLUCOSE URINE: 500 MG/DL
HCG, URINE, POC: NEGATIVE
HCT VFR BLD CALC: 43.5 % (ref 34–48)
HEMOGLOBIN: 13 G/DL (ref 11.5–15.5)
IMMATURE GRANULOCYTES #: 0.06 E9/L
IMMATURE GRANULOCYTES %: 0.5 % (ref 0–5)
KETONES, URINE: >=80 MG/DL
LACTIC ACID: 4 MMOL/L (ref 0.5–2.2)
LEUKOCYTE ESTERASE, URINE: NEGATIVE
LIPASE: 12 U/L (ref 13–60)
LYMPHOCYTES ABSOLUTE: 2.4 E9/L (ref 1.5–4)
LYMPHOCYTES RELATIVE PERCENT: 21.9 % (ref 20–42)
Lab: NORMAL
MAGNESIUM: 2 MG/DL (ref 1.6–2.6)
MCH RBC QN AUTO: 25.2 PG (ref 26–35)
MCHC RBC AUTO-ENTMCNC: 29.9 % (ref 32–34.5)
MCV RBC AUTO: 84.3 FL (ref 80–99.9)
METER GLUCOSE: 198 MG/DL (ref 74–99)
METER GLUCOSE: 286 MG/DL (ref 74–99)
METER GLUCOSE: >500 MG/DL (ref 74–99)
MONOCYTES ABSOLUTE: 0.25 E9/L (ref 0.1–0.95)
MONOCYTES RELATIVE PERCENT: 2.3 % (ref 2–12)
NEGATIVE QC PASS/FAIL: NORMAL
NEUTROPHILS ABSOLUTE: 8.2 E9/L (ref 1.8–7.3)
NEUTROPHILS RELATIVE PERCENT: 74.7 % (ref 43–80)
NITRITE, URINE: NEGATIVE
PDW BLD-RTO: 17.2 FL (ref 11.5–15)
PH UA: 5.5 (ref 5–9)
PH VENOUS: 7.04 (ref 7.35–7.45)
PHOSPHORUS: 5.3 MG/DL (ref 2.5–4.5)
PLATELET # BLD: 377 E9/L (ref 130–450)
PMV BLD AUTO: 11.2 FL (ref 7–12)
POSITIVE QC PASS/FAIL: NORMAL
POTASSIUM SERPL-SCNC: 4.4 MMOL/L (ref 3.5–5)
POTASSIUM SERPL-SCNC: 5.8 MMOL/L (ref 3.5–5)
PROTEIN UA: NEGATIVE MG/DL
RBC # BLD: 5.16 E12/L (ref 3.5–5.5)
SODIUM BLD-SCNC: 130 MMOL/L (ref 132–146)
SODIUM BLD-SCNC: 136 MMOL/L (ref 132–146)
SPECIFIC GRAVITY UA: 1.02 (ref 1–1.03)
TOTAL PROTEIN: 8.7 G/DL (ref 6.4–8.3)
UROBILINOGEN, URINE: 0.2 E.U./DL
WBC # BLD: 11 E9/L (ref 4.5–11.5)

## 2021-02-23 PROCEDURE — 6360000004 HC RX CONTRAST MEDICATION: Performed by: RADIOLOGY

## 2021-02-23 PROCEDURE — 83735 ASSAY OF MAGNESIUM: CPT

## 2021-02-23 PROCEDURE — 2580000003 HC RX 258: Performed by: INTERNAL MEDICINE

## 2021-02-23 PROCEDURE — 99223 1ST HOSP IP/OBS HIGH 75: CPT | Performed by: INTERNAL MEDICINE

## 2021-02-23 PROCEDURE — 82800 BLOOD PH: CPT

## 2021-02-23 PROCEDURE — 2000000000 HC ICU R&B

## 2021-02-23 PROCEDURE — 84100 ASSAY OF PHOSPHORUS: CPT

## 2021-02-23 PROCEDURE — 74177 CT ABD & PELVIS W/CONTRAST: CPT

## 2021-02-23 PROCEDURE — G0378 HOSPITAL OBSERVATION PER HR: HCPCS

## 2021-02-23 PROCEDURE — 6370000000 HC RX 637 (ALT 250 FOR IP): Performed by: INTERNAL MEDICINE

## 2021-02-23 PROCEDURE — 99284 EMERGENCY DEPT VISIT MOD MDM: CPT

## 2021-02-23 PROCEDURE — 2500000003 HC RX 250 WO HCPCS: Performed by: EMERGENCY MEDICINE

## 2021-02-23 PROCEDURE — 83690 ASSAY OF LIPASE: CPT

## 2021-02-23 PROCEDURE — 80053 COMPREHEN METABOLIC PANEL: CPT

## 2021-02-23 PROCEDURE — 6360000002 HC RX W HCPCS: Performed by: EMERGENCY MEDICINE

## 2021-02-23 PROCEDURE — 96366 THER/PROPH/DIAG IV INF ADDON: CPT

## 2021-02-23 PROCEDURE — 6360000002 HC RX W HCPCS: Performed by: INTERNAL MEDICINE

## 2021-02-23 PROCEDURE — 96375 TX/PRO/DX INJ NEW DRUG ADDON: CPT

## 2021-02-23 PROCEDURE — 81003 URINALYSIS AUTO W/O SCOPE: CPT

## 2021-02-23 PROCEDURE — 93005 ELECTROCARDIOGRAM TRACING: CPT | Performed by: EMERGENCY MEDICINE

## 2021-02-23 PROCEDURE — 96361 HYDRATE IV INFUSION ADD-ON: CPT

## 2021-02-23 PROCEDURE — 82962 GLUCOSE BLOOD TEST: CPT

## 2021-02-23 PROCEDURE — 83605 ASSAY OF LACTIC ACID: CPT

## 2021-02-23 PROCEDURE — 80048 BASIC METABOLIC PNL TOTAL CA: CPT

## 2021-02-23 PROCEDURE — 85025 COMPLETE CBC W/AUTO DIFF WBC: CPT

## 2021-02-23 PROCEDURE — 82010 KETONE BODYS QUAN: CPT

## 2021-02-23 PROCEDURE — 96374 THER/PROPH/DIAG INJ IV PUSH: CPT

## 2021-02-23 PROCEDURE — 96365 THER/PROPH/DIAG IV INF INIT: CPT

## 2021-02-23 PROCEDURE — 2580000003 HC RX 258: Performed by: EMERGENCY MEDICINE

## 2021-02-23 PROCEDURE — 6370000000 HC RX 637 (ALT 250 FOR IP): Performed by: EMERGENCY MEDICINE

## 2021-02-23 RX ORDER — 0.9 % SODIUM CHLORIDE 0.9 %
1000 INTRAVENOUS SOLUTION INTRAVENOUS ONCE
Status: COMPLETED | OUTPATIENT
Start: 2021-02-23 | End: 2021-02-23

## 2021-02-23 RX ORDER — SODIUM CHLORIDE 0.9 % (FLUSH) 0.9 %
10 SYRINGE (ML) INJECTION EVERY 12 HOURS SCHEDULED
Status: DISCONTINUED | OUTPATIENT
Start: 2021-02-23 | End: 2021-02-24 | Stop reason: HOSPADM

## 2021-02-23 RX ORDER — SODIUM CHLORIDE 0.9 % (FLUSH) 0.9 %
10 SYRINGE (ML) INJECTION PRN
Status: DISCONTINUED | OUTPATIENT
Start: 2021-02-23 | End: 2021-02-24 | Stop reason: HOSPADM

## 2021-02-23 RX ORDER — TRAZODONE HYDROCHLORIDE 150 MG/1
75 TABLET ORAL NIGHTLY PRN
Status: DISCONTINUED | OUTPATIENT
Start: 2021-02-23 | End: 2021-02-24 | Stop reason: HOSPADM

## 2021-02-23 RX ORDER — POLYETHYLENE GLYCOL 3350 17 G/17G
17 POWDER, FOR SOLUTION ORAL DAILY PRN
Status: DISCONTINUED | OUTPATIENT
Start: 2021-02-23 | End: 2021-02-24 | Stop reason: HOSPADM

## 2021-02-23 RX ORDER — SODIUM CHLORIDE 9 MG/ML
INJECTION, SOLUTION INTRAVENOUS CONTINUOUS
Status: DISCONTINUED | OUTPATIENT
Start: 2021-02-23 | End: 2021-02-24

## 2021-02-23 RX ORDER — DEXTROSE MONOHYDRATE 50 MG/ML
100 INJECTION, SOLUTION INTRAVENOUS PRN
Status: DISCONTINUED | OUTPATIENT
Start: 2021-02-23 | End: 2021-02-24 | Stop reason: HOSPADM

## 2021-02-23 RX ORDER — DEXTROSE MONOHYDRATE 25 G/50ML
12.5 INJECTION, SOLUTION INTRAVENOUS PRN
Status: DISCONTINUED | OUTPATIENT
Start: 2021-02-23 | End: 2021-02-24 | Stop reason: HOSPADM

## 2021-02-23 RX ORDER — NICOTINE POLACRILEX 4 MG
15 LOZENGE BUCCAL PRN
Status: DISCONTINUED | OUTPATIENT
Start: 2021-02-23 | End: 2021-02-24 | Stop reason: HOSPADM

## 2021-02-23 RX ORDER — POTASSIUM CHLORIDE 7.45 MG/ML
10 INJECTION INTRAVENOUS PRN
Status: DISCONTINUED | OUTPATIENT
Start: 2021-02-23 | End: 2021-02-24

## 2021-02-23 RX ORDER — DEXTROSE AND SODIUM CHLORIDE 5; .45 G/100ML; G/100ML
INJECTION, SOLUTION INTRAVENOUS CONTINUOUS PRN
Status: DISCONTINUED | OUTPATIENT
Start: 2021-02-23 | End: 2021-02-24

## 2021-02-23 RX ORDER — ONDANSETRON 2 MG/ML
4 INJECTION INTRAMUSCULAR; INTRAVENOUS EVERY 6 HOURS PRN
Status: DISCONTINUED | OUTPATIENT
Start: 2021-02-23 | End: 2021-02-24 | Stop reason: HOSPADM

## 2021-02-23 RX ORDER — ACETAMINOPHEN 650 MG/1
650 SUPPOSITORY RECTAL EVERY 6 HOURS PRN
Status: DISCONTINUED | OUTPATIENT
Start: 2021-02-23 | End: 2021-02-24

## 2021-02-23 RX ORDER — ACETAMINOPHEN 325 MG/1
650 TABLET ORAL EVERY 6 HOURS PRN
Status: DISCONTINUED | OUTPATIENT
Start: 2021-02-23 | End: 2021-02-24 | Stop reason: HOSPADM

## 2021-02-23 RX ORDER — MORPHINE SULFATE 4 MG/ML
4 INJECTION, SOLUTION INTRAMUSCULAR; INTRAVENOUS ONCE
Status: COMPLETED | OUTPATIENT
Start: 2021-02-23 | End: 2021-02-23

## 2021-02-23 RX ORDER — ONDANSETRON 2 MG/ML
4 INJECTION INTRAMUSCULAR; INTRAVENOUS ONCE
Status: COMPLETED | OUTPATIENT
Start: 2021-02-23 | End: 2021-02-23

## 2021-02-23 RX ORDER — DEXTROSE MONOHYDRATE 25 G/50ML
12.5 INJECTION, SOLUTION INTRAVENOUS PRN
Status: DISCONTINUED | OUTPATIENT
Start: 2021-02-23 | End: 2021-02-24

## 2021-02-23 RX ORDER — MAGNESIUM SULFATE 1 G/100ML
1000 INJECTION INTRAVENOUS PRN
Status: DISCONTINUED | OUTPATIENT
Start: 2021-02-23 | End: 2021-02-24

## 2021-02-23 RX ORDER — OXYCODONE HYDROCHLORIDE 5 MG/1
5 TABLET ORAL EVERY 4 HOURS PRN
Status: DISCONTINUED | OUTPATIENT
Start: 2021-02-23 | End: 2021-02-24 | Stop reason: HOSPADM

## 2021-02-23 RX ORDER — FAMOTIDINE 20 MG/1
20 TABLET, FILM COATED ORAL 2 TIMES DAILY
Status: DISCONTINUED | OUTPATIENT
Start: 2021-02-23 | End: 2021-02-24 | Stop reason: HOSPADM

## 2021-02-23 RX ORDER — PROMETHAZINE HYDROCHLORIDE 25 MG/1
12.5 TABLET ORAL EVERY 6 HOURS PRN
Status: DISCONTINUED | OUTPATIENT
Start: 2021-02-23 | End: 2021-02-24 | Stop reason: HOSPADM

## 2021-02-23 RX ADMIN — SODIUM BICARBONATE 50 MEQ: 84 INJECTION, SOLUTION INTRAVENOUS at 18:51

## 2021-02-23 RX ADMIN — INSULIN HUMAN 6 UNITS: 100 INJECTION, SOLUTION PARENTERAL at 18:53

## 2021-02-23 RX ADMIN — SODIUM BICARBONATE 50 MEQ: 84 INJECTION, SOLUTION INTRAVENOUS at 18:48

## 2021-02-23 RX ADMIN — ONDANSETRON 4 MG: 2 INJECTION INTRAMUSCULAR; INTRAVENOUS at 17:04

## 2021-02-23 RX ADMIN — POTASSIUM CHLORIDE 10 MEQ: 7.46 INJECTION, SOLUTION INTRAVENOUS at 22:51

## 2021-02-23 RX ADMIN — FAMOTIDINE 20 MG: 20 TABLET ORAL at 22:51

## 2021-02-23 RX ADMIN — OXYCODONE HYDROCHLORIDE 5 MG: 5 TABLET ORAL at 22:51

## 2021-02-23 RX ADMIN — MORPHINE SULFATE 4 MG: 4 INJECTION, SOLUTION INTRAMUSCULAR; INTRAVENOUS at 17:04

## 2021-02-23 RX ADMIN — SODIUM CHLORIDE 1000 ML: 9 INJECTION, SOLUTION INTRAVENOUS at 18:47

## 2021-02-23 RX ADMIN — IOPAMIDOL 75 ML: 755 INJECTION, SOLUTION INTRAVENOUS at 18:16

## 2021-02-23 RX ADMIN — SODIUM CHLORIDE 0.1 UNITS/KG/HR: 9 INJECTION, SOLUTION INTRAVENOUS at 18:56

## 2021-02-23 RX ADMIN — DEXTROSE AND SODIUM CHLORIDE: 5; 450 INJECTION, SOLUTION INTRAVENOUS at 23:40

## 2021-02-23 RX ADMIN — Medication 10 ML: at 22:51

## 2021-02-23 RX ADMIN — SODIUM CHLORIDE 1000 ML: 9 INJECTION, SOLUTION INTRAVENOUS at 17:03

## 2021-02-23 RX ADMIN — SODIUM CHLORIDE: 9 INJECTION, SOLUTION INTRAVENOUS at 22:46

## 2021-02-23 ASSESSMENT — PAIN SCALES - GENERAL
PAINLEVEL_OUTOF10: 10
PAINLEVEL_OUTOF10: 0
PAINLEVEL_OUTOF10: 10
PAINLEVEL_OUTOF10: 0
PAINLEVEL_OUTOF10: 10
PAINLEVEL_OUTOF10: 10

## 2021-02-23 NOTE — ED PROVIDER NOTES
HPI:  2/23/21,   Time: 4:44 PM YADIRA Meza is a 23 y.o. female presenting to the ED for nausea vomiting abdominal pain and high blood sugars, beginning 12 hours ago. The complaint has been persistent, moderate in severity, and worsened by nothing. 82-year-old female with known type 1 diabetes insulin-dependent 2 fairly noncompliant was discharged discharge from the hospital 5 weeks ago after being made to the ICU for DKA. Patient is chronic gastroparesis and abdominal pain with nausea vomiting as well. She complains of the same chronic abdominal pain today it seems worse the vomiting that increased today. States her blood sugar over than 500 at home. Denies any fevers or chills denies any chest pain shortness of breath denies any urinary complaints. States she has been able keep any fluids down all day today. She describes it as a cramping diffuse abdominal pain that is a 7 out of 10 at this time. No headache no syncope. No diarrhea. Review of Systems:   Pertinent positives and negatives are stated within HPI, all other systems reviewed and are negative.          --------------------------------------------- PAST HISTORY ---------------------------------------------  Past Medical History:  has a past medical history of Bleeding at insertion site, Common femoral artery injury, right, initial encounter, Depressive disorder, and DM type 1 (diabetes mellitus, type 1) ([facility] [address] Past Surgical History:  has a past surgical history that includes Abdomen surgery (N/A, 5/9/2019) and Bartholin gland cyst excision. Social History:  reports that she has been smoking cigarettes. She has never used smokeless tobacco. She reports current drug use. Drug: Marijuana. She reports that she does not drink alcohol. Family History: family history includes Diabetes in her maternal grandmother and paternal grandmother; Stroke in an other family member. The patients home medications have been reviewed. Allergies: Patient has no known allergies. ---------------------------------------------------PHYSICAL EXAM--------------------------------------    Constitutional/General: Alert and oriented x3, thin dehydrated appearing mildly uncomfortable  Head: Normocephalic and atraumatic  Eyes: PERRL, EOMI, conjunctive normal, sclera non icteric  Mouth: Oropharynx clear, handling secretions, no trismus, no asymmetry of the posterior oropharynx or uvular edema  Neck: Supple, full ROM, non tender to palpation in the midline, no stridor, no crepitus, no meningeal signs  Respiratory: Lungs clear to auscultation bilaterally, no wheezes, rales, or rhonchi. Not in respiratory distress  Cardiovascular:  Regular rate. Regular rhythm. No murmurs, gallops, or rubs. 2+ distal pulses  Chest: No chest wall tenderness  GI:  Abdomen Soft, diffuse abdominal tenderness. No Marrufo sign or McBurney's point tenderness. Non distended. +BS. No organomegaly, no palpable masses,  No rebound, guarding, or rigidity. No CVA tenderness. Musculoskeletal: Moves all extremities x 4. Warm and well perfused, no clubbing, cyanosis, or edema. Capillary refill <3 seconds  Integument: skin warm and dry. No rashes. Lymphatic: no lymphadenopathy noted  Neurologic: GCS 15, no focal deficits, symmetric strength 5/5 in the upper and lower extremities bilaterally  Psychiatric: flat affect    -------------------------------------------------- RESULTS -------------------------------------------------  I have personally reviewed all laboratory and imaging results for this patient. Results are listed below.      LABS:  Results for orders placed or performed during the hospital encounter of 02/23/21   CBC Auto Differential   Result Value Ref Range    WBC 11.0 4.5 - 11.5 E9/L    RBC 5.16 3.50 - 5.50 E12/L    Hemoglobin 13.0 11.5 - 15.5 g/dL    Hematocrit 43.5 34.0 - 48.0 %    MCV 84.3 80.0 - 99.9 fL    MCH 25.2 (L) 26.0 - 35.0 pg    MCHC 29.9 (L) 32.0 - 34.5 % RDW 17.2 (H) 11.5 - 15.0 fL    Platelets 492 792 - 811 E9/L    MPV 11.2 7.0 - 12.0 fL    Neutrophils % 74.7 43.0 - 80.0 %    Immature Granulocytes % 0.5 0.0 - 5.0 %    Lymphocytes % 21.9 20.0 - 42.0 %    Monocytes % 2.3 2.0 - 12.0 %    Eosinophils % 0.1 0.0 - 6.0 %    Basophils % 0.5 0.0 - 2.0 %    Neutrophils Absolute 8.20 (H) 1.80 - 7.30 E9/L    Immature Granulocytes # 0.06 E9/L    Lymphocytes Absolute 2.40 1.50 - 4.00 E9/L    Monocytes Absolute 0.25 0.10 - 0.95 E9/L    Eosinophils Absolute 0.01 (L) 0.05 - 0.50 E9/L    Basophils Absolute 0.05 0.00 - 0.20 E9/L   Comprehensive Metabolic Panel   Result Value Ref Range    Sodium 130 (L) 132 - 146 mmol/L    Potassium 5.8 (H) 3.5 - 5.0 mmol/L    Chloride 84 (L) 98 - 107 mmol/L    CO2 9 (LL) 22 - 29 mmol/L    Anion Gap 37 (H) 7 - 16 mmol/L    Glucose 812 (HH) 74 - 99 mg/dL    BUN 26 (H) 6 - 20 mg/dL    CREATININE 0.8 0.5 - 1.0 mg/dL    GFR Non-African American >60 >=60 mL/min/1.73    GFR African American >60     Calcium 10.8 (H) 8.6 - 10.2 mg/dL    Total Protein 8.7 (H) 6.4 - 8.3 g/dL    Albumin 4.7 3.5 - 5.2 g/dL    Total Bilirubin 0.4 0.0 - 1.2 mg/dL    Alkaline Phosphatase 153 (H) 35 - 104 U/L    ALT 53 (H) 0 - 32 U/L    AST 34 (H) 0 - 31 U/L   Lipase   Result Value Ref Range    Lipase 12 (L) 13 - 60 U/L   pH, venous   Result Value Ref Range    pH, Christoph 7.04 (LL) 7.35 - 7.45   Beta-Hydroxybutyrate   Result Value Ref Range    Beta-Hydroxybutyrate >4.50 (H) 0.02 - 0.27 mmol/L   Urinalysis   Result Value Ref Range    Color, UA Yellow Straw/Yellow    Clarity, UA Clear Clear    Glucose, Ur 500 (A) Negative mg/dL    Bilirubin Urine Negative Negative    Ketones, Urine >=80 (A) Negative mg/dL    Specific Gravity, UA 1.020 1.005 - 1.030    Blood, Urine Negative Negative    pH, UA 5.5 5.0 - 9.0    Protein, UA Negative Negative mg/dL    Urobilinogen, Urine 0.2 <2.0 E.U./dL    Nitrite, Urine Negative Negative    Leukocyte Esterase, Urine Negative Negative   Lactic Acid, Plasma Result Value Ref Range    Lactic Acid 4.0 (H) 0.5 - 2.2 mmol/L   POCT Glucose   Result Value Ref Range    Meter Glucose >500 (H) 74 - 99 mg/dL   POC Pregnancy Urine Qual   Result Value Ref Range    HCG, Urine, POC Negative Negative    Lot Number [id number]     Positive QC Pass/Fail Pass     Negative QC Pass/Fail Pass    EKG 12 Lead   Result Value Ref Range    Ventricular Rate 119 BPM    Atrial Rate 119 BPM    P-R Interval 140 ms    QRS Duration 64 ms    Q-T Interval 326 ms    QTc Calculation (Bazett) 458 ms    P Axis 87 degrees    R Axis 77 degrees    T Axis 65 degrees       RADIOLOGY:  Interpreted by Radiologist.  CT ABDOMEN PELVIS W IV CONTRAST Additional Contrast? None   Final Result   No definite CT evidence of acute pathology in the abdomen and pelvis          EKG: This EKG is signed and interpreted by the EP. Time: 16:47  Rate: 119  Rhythm: Sinus  Interpretation: no acute changes; no ST changes  Comparison: stable as compared to patient's most recent EKG      ------------------------- NURSING NOTES AND VITALS REVIEWED ---------------------------   The nursing notes within the ED encounter and vital signs as below have been reviewed by myself. BP 95/64   Pulse 115   Temp 97.5 °F (36.4 °C) (Oral)   Resp 13   Ht 5' 1\" (1.549 m)   Wt 99 lb (44.9 kg)   SpO2 98%   BMI 18.71 kg/m²   Oxygen Saturation Interpretation: Normal    The patients available past medical records and past encounters were reviewed.         ------------------------------ ED COURSE/MEDICAL DECISION MAKING----------------------  Medications   0.9 % sodium chloride bolus (1,000 mLs Intravenous New Bag 2/23/21 4947)   insulin regular (HUMULIN R;NOVOLIN R) injection 6 Units (has no administration in time range)   sodium bicarbonate 8.4 % injection 50 mEq (has no administration in time range)   glucose (GLUTOSE) 40 % oral gel 15 g (has no administration in time range)   dextrose 50 % IV solution (has no administration in time range) glucagon (rDNA) injection 1 mg (has no administration in time range)   dextrose 5 % solution (has no administration in time range)   insulin regular (HUMULIN R;NOVOLIN R) 100 Units in sodium chloride 0.9 % 100 mL infusion (has no administration in time range)   0.9 % sodium chloride bolus (0 mLs Intravenous Stopped 2/23/21 1829)   ondansetron (ZOFRAN) injection 4 mg (4 mg Intravenous Given 2/23/21 1704)   morphine sulfate (PF) injection 4 mg (4 mg Intravenous Given 2/23/21 1704)   iopamidol (ISOVUE-370) 76 % injection 75 mL (75 mLs Intravenous Given 2/23/21 1816)   sodium bicarbonate 8.4 % injection 50 mEq (50 mEq Intravenous Given 2/23/21 1848)         ED COURSE:  ED Course as of Feb 23 1852   Tue Feb 23, 2021 1849 To Dr. Ramiro Greene the hospitalist agreed admit the patient to her service. I spoke to Dr. Tania Hicks from the ICU accepted the patient to the ICU. He requested 2 Amps of bicarb and insulin drip as well as IV fluids    [KK]      ED Course User Index  [KK] Zechariah Urias MD       Medical Decision Making:    Patient is 21-year-old female who presents with nausea vomiting today uncontrolled blood sugars at home. Has a history of frequent DKA. She feels similar to that here. She also reports a lot of cramping diffuse abdominal pain similar to her gastroparesis episodes in the past.  She will have CT imaging will check lab work concerns for DKA should be given IV fluids and antiemetics at this time. Patient likely need admitted. Differential diagnoses nausea vomiting, gastroparesis, ileus, small bowel obstruction, DKA, dehydration electrolyte abnormality. This patient has remained hemodynamically stable and been closely monitored during their ED course. An EKG showed sinus tachycardia 119 beats minute no signs of any ST changes. Small dose of pain medication for abdominal pain as well as nausea medication she was given 2 L of IV fluids. Patient is CBC was normal hemoglobin 13 white count 11. Chemistry is markedly abnormal with  Anion gap of 37. Glucose of 812 CO2 of 9. Potassium was 5. 8. Patient's venous pH was 7.04. Beta hydroxybutyrate greater than 4.5. Patient clearly in DKA. I did speak to Dr. Zach Gautam the ICU Dr. Sharyn Waterman recommended 2 amps of sodium bicarb IV fluid insulin bolus and insulin drip. Patient was started on that here. She received 2 L of IV fluid here in the department. She also had a CT of the pelvis due to her chronic pain showed no acute pathology no ileus or bowel obstruction. Urinalysis negative for acute infection urine pregnancy was negative. Patient will be going to the ICU Dr. Zach Gautam will manage in the ICU. I spoke to the hospitalist Dr. Kurtis Bond who agreed with the plan for admission. Patient with a second IV placed here in the department peripherally prior to admission to the ICU. Re-Evaluations:             Re-evaluation. Patients symptoms show no change    Re-examination  2/23/21   4:44 PM EST          Vital Signs:   Vitals:    02/23/21 1606 02/23/21 1638 02/23/21 1650 02/23/21 1709   BP: (!) 144/63   95/64   Pulse: 127   115   Resp: 16   13   Temp:  97.5 °F (36.4 °C)     TempSrc:  Oral     SpO2: 98%   98%   Weight:   99 lb (44.9 kg)    Height:   5' 1\" (1.549 m)            Consultations:           Spoke to Dr. Radha Sy in the ICU accepted the patient to the ICU. He requested 2 A of bicarb and insulin drip and continue IV fluids. I spoke to Dr. Adi Koch  who excepted the patient to her hospitalist service      CRITICAL CARE:  48 MINUTES. Please note that the withdrawal or failure to initiate urgent interventions for this patient would likely result in a life threatening deterioration or permanent disability. Accordingly this patient received the above mentioned time, excluding separately billable procedures. Counseling:    The emergency provider has spoken with the patient and discussed todays results, in addition to providing specific details for the plan of care and counseling regarding the diagnosis and prognosis. Questions are answered at this time and they are agreeable with the plan.       --------------------------------- IMPRESSION AND DISPOSITION ---------------------------------    IMPRESSION  1. Diabetic ketoacidosis without coma associated with diabetes mellitus due to [facility]    2. Dehydration    3. Abdominal pain, unspecified abdominal location    4. Gastroparesis        DISPOSITION  Disposition: Admit to CCU/ICU  Patient condition is poor    NOTE: This report was transcribed using voice recognition software.  Every effort was made to ensure accuracy; however, inadvertent computerized transcription errors may be present        Marta Banda MD  02/23/21 4440

## 2021-02-24 VITALS
DIASTOLIC BLOOD PRESSURE: 69 MMHG | WEIGHT: 84.88 LBS | RESPIRATION RATE: 31 BRPM | TEMPERATURE: 98.8 F | HEART RATE: 119 BPM | OXYGEN SATURATION: 97 % | HEIGHT: 61 IN | SYSTOLIC BLOOD PRESSURE: 112 MMHG | BODY MASS INDEX: 16.02 KG/M2

## 2021-02-24 LAB
ALBUMIN SERPL-MCNC: 3.6 G/DL (ref 3.5–5.2)
ALP BLD-CCNC: 103 U/L (ref 35–104)
ALT SERPL-CCNC: 35 U/L (ref 0–32)
ANION GAP SERPL CALCULATED.3IONS-SCNC: 13 MMOL/L (ref 7–16)
ANION GAP SERPL CALCULATED.3IONS-SCNC: 14 MMOL/L (ref 7–16)
ANION GAP SERPL CALCULATED.3IONS-SCNC: 9 MMOL/L (ref 7–16)
AST SERPL-CCNC: 23 U/L (ref 0–31)
BASOPHILS ABSOLUTE: 0.06 E9/L (ref 0–0.2)
BASOPHILS RELATIVE PERCENT: 0.5 % (ref 0–2)
BILIRUB SERPL-MCNC: 0.2 MG/DL (ref 0–1.2)
BILIRUBIN DIRECT: <0.2 MG/DL (ref 0–0.3)
BILIRUBIN, INDIRECT: ABNORMAL MG/DL (ref 0–1)
BUN BLDV-MCNC: 13 MG/DL (ref 6–20)
BUN BLDV-MCNC: 15 MG/DL (ref 6–20)
BUN BLDV-MCNC: 20 MG/DL (ref 6–20)
CALCIUM SERPL-MCNC: 8.8 MG/DL (ref 8.6–10.2)
CALCIUM SERPL-MCNC: 8.9 MG/DL (ref 8.6–10.2)
CALCIUM SERPL-MCNC: 9 MG/DL (ref 8.6–10.2)
CHLORIDE BLD-SCNC: 103 MMOL/L (ref 98–107)
CHLORIDE BLD-SCNC: 103 MMOL/L (ref 98–107)
CHLORIDE BLD-SCNC: 104 MMOL/L (ref 98–107)
CO2: 18 MMOL/L (ref 22–29)
CO2: 20 MMOL/L (ref 22–29)
CO2: 21 MMOL/L (ref 22–29)
CREAT SERPL-MCNC: 0.5 MG/DL (ref 0.5–1)
CREAT SERPL-MCNC: 0.5 MG/DL (ref 0.5–1)
CREAT SERPL-MCNC: 0.6 MG/DL (ref 0.5–1)
EOSINOPHILS ABSOLUTE: 0.04 E9/L (ref 0.05–0.5)
EOSINOPHILS RELATIVE PERCENT: 0.3 % (ref 0–6)
GFR AFRICAN AMERICAN: >60
GFR NON-AFRICAN AMERICAN: >60 ML/MIN/1.73
GLUCOSE BLD-MCNC: 122 MG/DL (ref 74–99)
GLUCOSE BLD-MCNC: 179 MG/DL (ref 74–99)
GLUCOSE BLD-MCNC: 215 MG/DL (ref 74–99)
HCT VFR BLD CALC: 28.8 % (ref 34–48)
HEMOGLOBIN: 9.4 G/DL (ref 11.5–15.5)
IMMATURE GRANULOCYTES #: 0.05 E9/L
IMMATURE GRANULOCYTES %: 0.4 % (ref 0–5)
LACTIC ACID: 1.5 MMOL/L (ref 0.5–2.2)
LYMPHOCYTES ABSOLUTE: 4.33 E9/L (ref 1.5–4)
LYMPHOCYTES RELATIVE PERCENT: 33.2 % (ref 20–42)
MAGNESIUM: 1.8 MG/DL (ref 1.6–2.6)
MAGNESIUM: 1.8 MG/DL (ref 1.6–2.6)
MCH RBC QN AUTO: 25.8 PG (ref 26–35)
MCHC RBC AUTO-ENTMCNC: 32.6 % (ref 32–34.5)
MCV RBC AUTO: 78.9 FL (ref 80–99.9)
METER GLUCOSE: 106 MG/DL (ref 74–99)
METER GLUCOSE: 123 MG/DL (ref 74–99)
METER GLUCOSE: 139 MG/DL (ref 74–99)
METER GLUCOSE: 172 MG/DL (ref 74–99)
METER GLUCOSE: 172 MG/DL (ref 74–99)
METER GLUCOSE: 181 MG/DL (ref 74–99)
METER GLUCOSE: 190 MG/DL (ref 74–99)
METER GLUCOSE: 227 MG/DL (ref 74–99)
METER GLUCOSE: 269 MG/DL (ref 74–99)
METER GLUCOSE: 289 MG/DL (ref 74–99)
METER GLUCOSE: 312 MG/DL (ref 74–99)
MONOCYTES ABSOLUTE: 0.94 E9/L (ref 0.1–0.95)
MONOCYTES RELATIVE PERCENT: 7.2 % (ref 2–12)
NEUTROPHILS ABSOLUTE: 7.63 E9/L (ref 1.8–7.3)
NEUTROPHILS RELATIVE PERCENT: 58.4 % (ref 43–80)
PDW BLD-RTO: 17.2 FL (ref 11.5–15)
PHOSPHORUS: 3.8 MG/DL (ref 2.5–4.5)
PHOSPHORUS: 3.9 MG/DL (ref 2.5–4.5)
PLATELET # BLD: 317 E9/L (ref 130–450)
PMV BLD AUTO: 10.6 FL (ref 7–12)
POTASSIUM REFLEX MAGNESIUM: 4.7 MMOL/L (ref 3.5–5)
POTASSIUM SERPL-SCNC: 4.1 MMOL/L (ref 3.5–5)
POTASSIUM SERPL-SCNC: 4.7 MMOL/L (ref 3.5–5)
RBC # BLD: 3.65 E12/L (ref 3.5–5.5)
SODIUM BLD-SCNC: 134 MMOL/L (ref 132–146)
SODIUM BLD-SCNC: 134 MMOL/L (ref 132–146)
SODIUM BLD-SCNC: 137 MMOL/L (ref 132–146)
TOTAL PROTEIN: 6.3 G/DL (ref 6.4–8.3)
WBC # BLD: 13.1 E9/L (ref 4.5–11.5)

## 2021-02-24 PROCEDURE — 2580000003 HC RX 258: Performed by: INTERNAL MEDICINE

## 2021-02-24 PROCEDURE — 6370000000 HC RX 637 (ALT 250 FOR IP): Performed by: INTERNAL MEDICINE

## 2021-02-24 PROCEDURE — 82962 GLUCOSE BLOOD TEST: CPT

## 2021-02-24 PROCEDURE — 99222 1ST HOSP IP/OBS MODERATE 55: CPT | Performed by: INTERNAL MEDICINE

## 2021-02-24 PROCEDURE — 6360000002 HC RX W HCPCS: Performed by: INTERNAL MEDICINE

## 2021-02-24 PROCEDURE — 96366 THER/PROPH/DIAG IV INF ADDON: CPT

## 2021-02-24 PROCEDURE — 83605 ASSAY OF LACTIC ACID: CPT

## 2021-02-24 PROCEDURE — 36415 COLL VENOUS BLD VENIPUNCTURE: CPT

## 2021-02-24 PROCEDURE — 80048 BASIC METABOLIC PNL TOTAL CA: CPT

## 2021-02-24 PROCEDURE — 83735 ASSAY OF MAGNESIUM: CPT

## 2021-02-24 PROCEDURE — 99239 HOSP IP/OBS DSCHRG MGMT >30: CPT | Performed by: INTERNAL MEDICINE

## 2021-02-24 PROCEDURE — 96376 TX/PRO/DX INJ SAME DRUG ADON: CPT

## 2021-02-24 PROCEDURE — 87040 BLOOD CULTURE FOR BACTERIA: CPT

## 2021-02-24 PROCEDURE — 80076 HEPATIC FUNCTION PANEL: CPT

## 2021-02-24 PROCEDURE — G0378 HOSPITAL OBSERVATION PER HR: HCPCS

## 2021-02-24 PROCEDURE — 96361 HYDRATE IV INFUSION ADD-ON: CPT

## 2021-02-24 PROCEDURE — 84100 ASSAY OF PHOSPHORUS: CPT

## 2021-02-24 PROCEDURE — 85025 COMPLETE CBC W/AUTO DIFF WBC: CPT

## 2021-02-24 RX ORDER — INSULIN GLARGINE 100 [IU]/ML
15 INJECTION, SOLUTION SUBCUTANEOUS EVERY MORNING
Status: DISCONTINUED | OUTPATIENT
Start: 2021-02-24 | End: 2021-02-24 | Stop reason: HOSPADM

## 2021-02-24 RX ORDER — INSULIN LISPRO 100 [IU]/ML
INJECTION, SOLUTION INTRAVENOUS; SUBCUTANEOUS
Qty: 4 PEN | Refills: 2 | Status: ON HOLD | OUTPATIENT
Start: 2021-02-24 | End: 2021-07-26 | Stop reason: SDUPTHER

## 2021-02-24 RX ORDER — PEN NEEDLE, DIABETIC 32GX 5/32"
NEEDLE, DISPOSABLE MISCELLANEOUS
Qty: 100 EACH | Refills: 3 | Status: SHIPPED | OUTPATIENT
Start: 2021-02-24 | End: 2021-03-17

## 2021-02-24 RX ORDER — INSULIN GLARGINE 100 [IU]/ML
25 INJECTION, SOLUTION SUBCUTANEOUS NIGHTLY
Qty: 5 PEN | Refills: 3 | Status: ON HOLD | OUTPATIENT
Start: 2021-02-24 | End: 2021-07-26 | Stop reason: SDUPTHER

## 2021-02-24 RX ADMIN — BENZOCAINE AND MENTHOL 1 LOZENGE: 15; 3.6 LOZENGE ORAL at 01:42

## 2021-02-24 RX ADMIN — Medication 10 ML: at 10:02

## 2021-02-24 RX ADMIN — POTASSIUM CHLORIDE 10 MEQ: 7.46 INJECTION, SOLUTION INTRAVENOUS at 06:15

## 2021-02-24 RX ADMIN — OXYCODONE HYDROCHLORIDE 5 MG: 5 TABLET ORAL at 04:54

## 2021-02-24 RX ADMIN — INSULIN LISPRO 2 UNITS: 100 INJECTION, SOLUTION INTRAVENOUS; SUBCUTANEOUS at 16:43

## 2021-02-24 RX ADMIN — INSULIN LISPRO 3 UNITS: 100 INJECTION, SOLUTION INTRAVENOUS; SUBCUTANEOUS at 11:25

## 2021-02-24 RX ADMIN — POTASSIUM CHLORIDE 10 MEQ: 7.46 INJECTION, SOLUTION INTRAVENOUS at 00:45

## 2021-02-24 RX ADMIN — FAMOTIDINE 20 MG: 20 TABLET ORAL at 09:52

## 2021-02-24 RX ADMIN — POTASSIUM CHLORIDE 10 MEQ: 7.46 INJECTION, SOLUTION INTRAVENOUS at 04:30

## 2021-02-24 RX ADMIN — INSULIN LISPRO 4 UNITS: 100 INJECTION, SOLUTION INTRAVENOUS; SUBCUTANEOUS at 16:44

## 2021-02-24 RX ADMIN — INSULIN GLARGINE 15 UNITS: 100 INJECTION, SOLUTION SUBCUTANEOUS at 07:51

## 2021-02-24 RX ADMIN — INSULIN LISPRO 4 UNITS: 100 INJECTION, SOLUTION INTRAVENOUS; SUBCUTANEOUS at 11:25

## 2021-02-24 RX ADMIN — OXYCODONE HYDROCHLORIDE 5 MG: 5 TABLET ORAL at 10:04

## 2021-02-24 RX ADMIN — POTASSIUM CHLORIDE 10 MEQ: 7.46 INJECTION, SOLUTION INTRAVENOUS at 02:56

## 2021-02-24 RX ADMIN — DEXTROSE AND SODIUM CHLORIDE: 5; 450 INJECTION, SOLUTION INTRAVENOUS at 06:25

## 2021-02-24 ASSESSMENT — PAIN SCALES - GENERAL
PAINLEVEL_OUTOF10: 0
PAINLEVEL_OUTOF10: 7
PAINLEVEL_OUTOF10: 0
PAINLEVEL_OUTOF10: 8
PAINLEVEL_OUTOF10: 0
PAINLEVEL_OUTOF10: 0

## 2021-02-24 ASSESSMENT — PAIN DESCRIPTION - DESCRIPTORS: DESCRIPTORS: ACHING

## 2021-02-24 ASSESSMENT — PAIN DESCRIPTION - FREQUENCY: FREQUENCY: INTERMITTENT

## 2021-02-24 ASSESSMENT — PAIN DESCRIPTION - LOCATION: LOCATION: BACK

## 2021-02-24 ASSESSMENT — PAIN DESCRIPTION - ORIENTATION: ORIENTATION: UPPER;LOWER

## 2021-02-24 ASSESSMENT — PAIN DESCRIPTION - PROGRESSION: CLINICAL_PROGRESSION: GRADUALLY IMPROVING

## 2021-02-24 ASSESSMENT — PAIN DESCRIPTION - PAIN TYPE: TYPE: ACUTE PAIN;CHRONIC PAIN

## 2021-02-24 ASSESSMENT — PAIN - FUNCTIONAL ASSESSMENT: PAIN_FUNCTIONAL_ASSESSMENT: ACTIVITIES ARE NOT PREVENTED

## 2021-02-24 ASSESSMENT — PAIN DESCRIPTION - ONSET: ONSET: ON-GOING

## 2021-02-24 NOTE — PROGRESS NOTES
Attending Physician Attestation: Dr. Roger Barrios    Thank you very much for allowing me to see this patient in consultation and follow up. I personally saw, examined and provided care for the patient. Radiographs, labs and medication list were reviewed by me independently. I spoke with bedside nursing, respiratory therapists and consultants. Critical care services and times documented are independent of procedures and multidisciplinary rounds with Residents. Additionally comprehensive, multidisciplinary rounds were conducted with the MICU team. The case was discussed in detail and plans for care were established. Review of Residents documentation was conducted and revisions were made as appropriate. I agree with the the above documented information. Physical Examination:  Vitals:   Vitals:    02/24/21 1000 02/24/21 1100 02/24/21 1200 02/24/21 1300   BP: 99/62 (!) 100/58 (!) 101/59 (!) 101/59   Pulse: 115 113 111 119   Resp: 13 (!) 42 14 24   Temp:   97.7 °F (36.5 °C)    TempSrc:   Infrared    SpO2:  97%     Weight:       Height:          General: No Acute Distress  HEENT: normocephalic, atruamatic  CVS: RRR, S1, S2, No S3 or S4  Respiratory: decreased breath sounds at lung bases, no focal wheezes noted  Extremities: no clubbing/cyanosis/edema    ASSESSMENT:  1.) DKA    - DKA resolved  - OK for D/C with follow up with endocrinology and nutrition     Thank you for allowing me to participate in the care of this patient. Care reviewed with nursing staff, medical and surgical specialty care, primary care and the patient's family as available. Restraints are ordered when the patient can do harm to him/herself by pulling out devices. Roger Barrios M.D.     Roger Barrios  2/24/2021  2:28 PM

## 2021-02-24 NOTE — PATIENT CARE CONFERENCE
Intensive Care Daily Quality Rounding Checklist      ICU Team Members: Dr. Artur Gaona, Keshia Childers (residents), charge nurse, bedside nurse, clinical pharmacist, respiratory therapist`    ICU Day #: NUMBER: 2    Intubation Date: N/A    Ventilator Day #: N/A    Central Line Insertion Date: N/A        Day #: N/A     Arterial Line Insertion Date: N/A      Day #: N/A    Temporary Hemodialysis Catheter Insertion Date: N/A      Day #: N/A    DVT Prophylaxis: Lovenox    GI Prophylaxis: Pepcid     Bey Catheter Insertion Date: N/A       Day #: N/A      Continued need (if yes, reason documented and discussed with physician): N/A    Skin Issues/ Wounds and ordered treatment discussed on rounds: none     Goals/ Plans for the Day: Discharge today

## 2021-02-24 NOTE — CARE COORDINATION
CASE MANAGEMENT. .. Noted patient will discharge home today from icu. Called and spoke with [facility] over the phone. She voices being independent and lives with her grandma. She is diabetic. Has a glucometer and all of her supplies. Informed me that she is low on her lantus. Charge nurse Rachana Kyle notified and will discuss with Dr Jazmin Clayton. No other needs noted. [facility] follows with Dr Vivek Stephenson and Dr Assunta Leyden.

## 2021-02-24 NOTE — DISCHARGE SUMMARY
[facility] Physician Discharge Summary       [address] Maria MD  [address]  [phone]    On 3/17/2021  Endocrine follow up visit, Wednesday 3/17 at 1:00PM       Activity level: As tolerated     Dispo: home    Condition on discharge: Stable     Patient ID:  Lisa Brice  [id number]  72 y.o.  1995    Admit date: 2/23/2021    Discharge date and time:  2/24/2021  4:16 PM    Admission Diagnoses: Active Problems:    DKA, type 1, not at goal [facility])  Resolved Problems:    * No resolved hospital problems. *      Discharge Diagnoses: Active Problems:    DKA, type 1, not at goal [facility])  Resolved Problems:    * No resolved hospital problems. *      Consults:  IP CONSULT TO PULMONOLOGY  IP CONSULT TO ENDOCRINOLOGY  IP CONSULT TO CRITICAL CARE  IP CONSULT TO [address] [place] [address] Course:   Patient Lisa Brice is a 23 y.o. presented with DKA, type 1, not at goal ([address] [E10.10]    1. DKA I, presented with BG of 800's, AG of 37 , bicarb of 9, we admitted the patient to the ICU, started the patient on insulin drip and IVF, anion gap closed at 13, BG down to 172, stop insulin drip, start lantus and SSI, consulting endo, patient was discharged on 25 units of lantus, 5 units of lispro and SSI, patient changed her diet to include a lot of fruits, discharge stable on home insulin  2. Leukocytosis, no signs of infection on the CXR or the UA, might be reactive, off abx. 3.  Lactic acidosis, presented with LA of 4, due to DKA, and dehydration, resolved with hydration IVF  4. Hyponatremia, pseudo due to hyperglycemia.  Resolved    Discharge Exam:    General Appearance: alert and oriented to person, place and time and in no acute distress  Skin: warm and dry  Head: normocephalic and atraumatic  Eyes: pupils equal, round, and reactive to light, extraocular eye movements intact, conjunctivae normal  Neck: neck supple and non tender without mass   Pulmonary/Chest: clear to auscultation bilaterally- no wheezes, rales or rhonchi, normal air movement, no respiratory distress  Cardiovascular: normal rate, normal S1 and S2 and no carotid bruits  Abdomen: soft, non-tender, non-distended, normal bowel sounds, no masses or organomegaly  Extremities: no cyanosis, no clubbing and no edema  Neurologic: no cranial nerve deficit and speech normal    I/O last 3 completed shifts: In: 1660 [I.V.:1410; IV Piggyback:250]  Out: -   No intake/output data recorded. LABS:  Recent Labs     02/24/21  0146 02/24/21  0610 02/24/21  1010    134 134   K 4.1 4.7 4.7    103 104   CO2 20* 18* 21*   BUN 20 15 13   CREATININE 0.6 0.5 0.5   GLUCOSE 122* 215* 179*   CALCIUM 9.0 8.8 8.9       Recent Labs     02/23/21  1648 02/24/21  0610   WBC 11.0 13.1*   RBC 5.16 3.65   HGB 13.0 9.4*   HCT 43.5 28.8*   MCV 84.3 78.9*   MCH 25.2* 25.8*   MCHC 29.9* 32.6   RDW 17.2* 17.2*    317   MPV 11.2 10.6       No results for input(s): POCGLU in the last 72 hours. Imaging:  Ct Abdomen Pelvis W Iv Contrast Additional Contrast? None    Result Date: 2/23/2021  EXAMINATION: CT OF THE ABDOMEN AND PELVIS WITH CONTRAST 2/23/2021 6:16 pm TECHNIQUE: CT of the abdomen and pelvis was performed with the administration of intravenous contrast. Multiplanar reformatted images are provided for review. Dose modulation, iterative reconstruction, and/or weight based adjustment of the mA/kV was utilized to reduce the radiation dose to as low as reasonably achievable. COMPARISON: 12/18/2020 HISTORY: ORDERING SYSTEM PROVIDED HISTORY: Nausea, vomiting, abdominal pain TECHNOLOGIST PROVIDED HISTORY: Additional Contrast?->None Reason for exam:->n/v/abd pain Reason for exam:->h/o gastroparesis Decision Support Exception->Emergency Medical Condition (MA) Prior abdominal surgery includes incision and drainage of suprapubic abscess in May 2019. Bartholin gland cyst excision last year.  FINDINGS: No acute fracture or vertebral compression. Normal cardiac size without pericardial effusion. No acute lung base findings. Normal-appearing liver, gallbladder, adrenals, pancreas, and spleen. Intact normal caliber abdominal aorta and IVC. Normal-appearing kidneys and proximal ureters. The examination is limited from a paucity of anatomical intra-abdominal fat to separately identify adjacent organs and structures. The ureters are largely obscured by adjacent soft tissues. Intestinal loops are also difficult to independently assess. Distal ureters are largely obscured by adjacent structures. No definite urinary tract calculus or hydronephrosis. Intact anterior abdominal wall without hernia. No mesenteric mass or adenopathy. No retroperitoneal adenopathy. Normal-appearing stomach and duodenum. No small bowel distention in the abdomen and pelvis to suggest intestinal obstruction. No pelvic cul-de-sac free fluid. Normal-appearing urinary bladder, uterus, and adnexa. No definite rectal abnormality. Normal-appearing sigmoid colon. No gross ascites, free air, or colonic distention. No definite CT evidence of constipation. Normal-appearing cecum, terminal ileum, and appendix. No definite CT evidence of acute pathology in the abdomen and pelvis      Patient Instructions:      Medication List      CHANGE how you take these medications    Basaglar KwikPen 100 UNIT/ML injection pen  Generic drug: insulin glargine  Inject 25 Units into the skin nightly  What changed: Another medication with the same name was removed. Continue taking this medication, and follow the directions you see here.      * insulin lispro 100 UNIT/ML injection vial  Commonly known as: HUMALOG  Inject 0-3 Units into the skin nightly  What changed:   · when to take this  · additional instructions     * insulin lispro (1 Unit Dial) 100 UNIT/ML Sopn  Inject 3 times daily before using carb ratrio of 1U:10G  Plus sliding scale max 50 units daily  What changed: Another medication

## 2021-02-24 NOTE — PROGRESS NOTES
Reason for consult: recurrent DKA, type 1    A1C: 11.2 % ( 10/8/20)     [] Not available                 Patient states the following concerns/barriers to diabetes self-management:       [x] None       [] Medication cost   [] Food cost/availability          [] Reading  [] Hearing   [] Vision                  [] Work    [] Transportation  [] No insurance    [] Physical limitations    [] Other:              Diabetes survival packet provided to:   [x] Patient     [] Other:               Floor nursing provided survival packet    Information reviewed:   Definition of diabetes   Target glucose ranges/A1C   Self-monitoring of blood glucose   Prevention/symptoms/treatment of hypo-/hyperglycemia   Medication adherence   The plate method/meal planning guidelines   The benefits of exercise and recommendations   Reducing the risk of chronic complications    Comment: Pt has been seen multiple times by diabetes education. Pt states she has been eating larger portions of fruit. Pt was able to correctly state how to read the food label for carbohydrate servings, Discussed 4 carbohydrate choices or 60 grams at each meal and keeping records of blood sugars to review at doctor visit for proper insulin adjustment. Encouraged outpatient diabetes education for more support for diabetes management.      Evaluation/Plan/Recommendations:   Patient's understanding of diabetes:   []Poor   [x]Fair    []Good   [] Excellent     Outpatient diabetes education is recommended:   [x] Yes  [] No   [] As needed  Patient is interested in outpatient diabetes education:   [] Yes    [] No    [x] Unsure    Recommended:     [] Consult to social work; patient has no insurance or financial hardship      [] Script for glucometer and supplies (per preference of patient's insurance)               [x] Script for outpatient diabetes education classes from PCP    [x] Carbohydrate-controlled diet    [] Patient prefers/educator recommends insulin pens instead of syringes     (if insulin ordered for home use)    Thank you for this consult.

## 2021-02-24 NOTE — CONSULTS
[facility]  Department of Critical Care   MICU H&P      Patient:  Cliff Blackman 23 y.o. female  MRN: [id number]     Date of Service: 2/24/2021    Allergy: Patient has no known allergies. History of Present Illness   The patient is a 23 y.o. female with history of DM1 with recurrent encounters for DKA, most recent admission in January 2021 for similar encounter, presented to the ED due to N/V and abdominal pain; reported uncontrolled blood glucose since this morning, and endorses medication compliance, did report that she has recently been changing her diet to a vegetarian diet and has also had decreased p.o. intake; EKG showed sinus tachycardia, CT abdomen pelvis was negative for any acute intra-abdominal pathology to cause her abdominal pain; blood glucose was 812, anion gap 37, potassium 5.8; calcium 10.8; beta hydroxybutyrate >4.50; venous pH 7.04. She was initially given 6 units regular insulin, IV morphine, 2 A of bicarb, and Zofran. She was also started on insulin drip. She was accepted to the ICU for admission for DKA. ROS: Denies Fever/chills/CP/SOB//D/C/Dysuria/Blood in stool or urine; endorses N/V, abdominal pain, decreased p.o. intake    Past Medical History:      Diagnosis Date    Bleeding at insertion site 1/5/2018    Common femoral artery injury, right, initial encounter 1/5/2018    Depressive disorder     DM type 1 (diabetes mellitus, type 1) ([facility] [address]        Past Surgical History:        Procedure Laterality Date    ABDOMEN SURGERY N/A 5/9/2019    INCISION AND DRAINAGE OF SUPRAPUBIC ABSESS performed by Grace Mcmullen MD at [address] [place] [address]      last yr       Medications Prior to Admission:    Prior to Admission medications    Medication Sig Start Date End Date Taking?  Authorizing Provider   insulin glargine [facility]) 100 UNIT/ML injection pen Inject 25 Units into the skin nightly 2/24/21  Yes Angelic Simpson MD   insulin lispro, 1 Unit Dial, 100 UNIT/ML SOPN Inject 3 times daily before using carb ratrio of 1U:10G  Plus sliding scale max 50 units daily 2/24/21  Yes Halie Benavides MD   BD PEN NEEDLE SHELBIE U/F 32G X 4 MM MISC USE AS DIRECTED UP TO 6 TIMES A DAY 2/24/21  Yes Halie Benavides MD   linaclotide [facility]) 290 MCG CAPS capsule Take 290 mcg by mouth 1/25/21  Yes Historical Provider, MD   traZODone (DESYREL) 50 MG tablet TAKE 1 1/2 TABLET BY MOUTH AT BEDTIME FOR SLEEP 1/25/21  Yes Historical Provider, MD   insulin lispro (HUMALOG) 100 UNIT/ML injection vial Inject 0-3 Units into the skin nightly  Patient taking differently: Inject 0-3 Units into the skin 3 times daily (before meals) Sliding scale 1/14/21  Yes Flori Ortiz, APRN - CNP   NOVOLOG FLEXPEN 100 UNIT/ML injection pen PER SLIDING SCALE. MAX OF 60 UNITS DAILY 1/26/21   Historical Provider, MD       Allergies:  Patient has no known allergies. Social History:   TOBACCO:   reports that she has been smoking cigarettes. She has never used smokeless tobacco.  ETOH:   reports no history of alcohol use.       Family History:       Problem Relation Age of Onset    Diabetes Maternal Grandmother     Diabetes Paternal Grandmother     Stroke Other     Thyroid Disease Neg Hx        Objective     VS: /69   Pulse 119   Temp 98.8 °F (37.1 °C) (Infrared)   Resp (!) 31   Ht 5' 1\" (1.549 m)   Wt 84 lb 14 oz (38.5 kg)   SpO2 97%   BMI 16.04 kg/m²           I & O - 24hr:     Intake/Output Summary (Last 24 hours) at 2/24/2021 1720  Last data filed at 2/24/2021 2670  Gross per 24 hour   Intake 1660 ml   Output    Net 1660 ml       Physical Exam:  · General Appearance: alert, appears stated age and cooperative  · Neck: supple, symmetrical, trachea midline  · Lung: clear to auscultation bilaterally  · Heart: regular rate and rhythm  · Abdomen: soft, non-tender; bowel sounds normal; no masses,  no organomegaly  · Extremities:  extremities normal, atraumatic, no cyanosis or edema  · Musculoskeletal: No joint swelling, no muscle tenderness. ROM normal in all joints of extremities. · Neurologic: Mental status: Alert, oriented, thought content appropriate    Labs and Imaging Studies   Basic Labs  Recent Labs     02/24/21  0146 02/24/21  0610 02/24/21  1010    134 134   K 4.1 4.7 4.7    103 104   CO2 20* 18* 21*   BUN 20 15 13   CREATININE 0.6 0.5 0.5   GLUCOSE 122* 215* 179*   CALCIUM 9.0 8.8 8.9       Recent Labs     02/23/21  1648 02/24/21  0610   WBC 11.0 13.1*   RBC 5.16 3.65   HGB 13.0 9.4*   HCT 43.5 28.8*   MCV 84.3 78.9*   MCH 25.2* 25.8*   MCHC 29.9* 32.6   RDW 17.2* 17.2*    317   MPV 11.2 10.6       Imaging Studies:    Ct Abdomen Pelvis W Iv Contrast Additional Contrast? None    Result Date: 2/23/2021  EXAMINATION: CT OF THE ABDOMEN AND PELVIS WITH CONTRAST 2/23/2021 6:16 pm TECHNIQUE: CT of the abdomen and pelvis was performed with the administration of intravenous contrast. Multiplanar reformatted images are provided for review. Dose modulation, iterative reconstruction, and/or weight based adjustment of the mA/kV was utilized to reduce the radiation dose to as low as reasonably achievable. COMPARISON: 12/18/2020 HISTORY: ORDERING SYSTEM PROVIDED HISTORY: Nausea, vomiting, abdominal pain TECHNOLOGIST PROVIDED HISTORY: Additional Contrast?->None Reason for exam:->n/v/abd pain Reason for exam:->h/o gastroparesis Decision Support Exception->Emergency Medical Condition (MA) Prior abdominal surgery includes incision and drainage of suprapubic abscess in May 2019. Bartholin gland cyst excision last year. FINDINGS: No acute fracture or vertebral compression. Normal cardiac size without pericardial effusion. No acute lung base findings. Normal-appearing liver, gallbladder, adrenals, pancreas, and spleen. Intact normal caliber abdominal aorta and IVC. Normal-appearing kidneys and proximal ureters.   The examination is limited from a paucity of anatomical intra-abdominal fat to separately identify adjacent organs and structures. The ureters are largely obscured by adjacent soft tissues. Intestinal loops are also difficult to independently assess. Distal ureters are largely obscured by adjacent structures. No definite urinary tract calculus or hydronephrosis. Intact anterior abdominal wall without hernia. No mesenteric mass or adenopathy. No retroperitoneal adenopathy. Normal-appearing stomach and duodenum. No small bowel distention in the abdomen and pelvis to suggest intestinal obstruction. No pelvic cul-de-sac free fluid. Normal-appearing urinary bladder, uterus, and adnexa. No definite rectal abnormality. Normal-appearing sigmoid colon. No gross ascites, free air, or colonic distention. No definite CT evidence of constipation. Normal-appearing cecum, terminal ileum, and appendix. No definite CT evidence of acute pathology in the abdomen and pelvis           Lines     site day    Art line   None    TLC None    PICC None    Hemoaccess None    Oxygen:     none  Mechanical Ventilation:   none  ABG:     Lab Results   Component Value Date    PH 7.282 11/10/2020    PCO2 47.5 11/10/2020    PO2 20.8 11/10/2020    HCO3 21.9 11/10/2020    BE -4.8 11/10/2020    THB 11.8 11/10/2020    O2SAT 26.5 11/10/2020             Medications     Infusions: (Fluid, Sedation, Vasopressors)  IVF:    Insulin -- discontinued/bridged  Vasopressors   none  Sedation   none    Nutrition:   Advance diet as tolerated    ATB:   Antibiotics  Days   none              Patient currently has   DVT/GI prophylaxis      EKG: Sinus tachycardia    Resident's Assessment and Plan        1. Neuro   AAOx3, no neurologic deficits    2. Cardiovascular   Sinus tachycardia in the ED, now resolved      3. Respiratory  · CXR in the ED negative for any acute findings  · No complaints at this time    4.   GI   DKA --abdominal pain on initial encounter in the ED, improved with IV morphine   Abdominal exam this morning is unremarkable, pain is improved with improvement of DKA   GI prophylaxis --Pepcid   Advance diet as tolerated    5. Renal  · BUN 15, creatinine 0.5  · Good urine output  · Potassium 5.8 initially on arrival, now resolved to 4.7 with resolution of DKA  · Replete electrolytes as needed    6. Infectious disease   Blood cultures x2   Leukocytosis 13.1   UA does not indicate any evidence for UTI   Afebrile, CT abdomen pelvis negative    7.   Endocrine   DKA --, anion gap 37, venous pH 7.04   DKA now resolved --anion gap 13, patient bridged off insulin GTT   Okay for discharge with follow-up with endocrinology and nutrition      DVT prophylaxis/ GI prophylaxis: Lovenox/Pepcid  Disposition: home    Edgard Sutherland DO, PGY-1   Attending physician: Dr. Jose Jung MD

## 2021-02-24 NOTE — CONSULTS
History:   Diagnosis Date    Bleeding at insertion site 1/5/2018    Common femoral artery injury, right, initial encounter 1/5/2018    Depressive disorder     DM type 1 (diabetes mellitus, type 1) ([facility] [address]        Past surgical history:  Past Surgical History:   Procedure Laterality Date    ABDOMEN SURGERY N/A 5/9/2019    INCISION AND DRAINAGE OF SUPRAPUBIC ABSESS performed by Grace Mcmullen MD at [facility]      last yr       Social history:   Tobacco:   reports that she has been smoking cigarettes. She has never used smokeless tobacco.  Alcohol:   reports no history of alcohol use. Drugs:   reports current drug use. Drug: Marijuana.     Family history:    Family History   Problem Relation Age of Onset    Diabetes Maternal Grandmother     Diabetes Paternal Grandmother     Stroke Other     Thyroid Disease Neg Hx        Allergy and drug reactions:   No Known Allergies    Scheduled Meds:   insulin glargine  15 Units Subcutaneous QAM    insulin lispro  0-6 Units Subcutaneous TID WC    insulin lispro  0-3 Units Subcutaneous Nightly    insulin lispro  4 Units Subcutaneous TID WC    sodium chloride flush  10 mL Intravenous 2 times per day    enoxaparin  40 mg Subcutaneous Daily    famotidine  20 mg Oral BID     PRN Meds:       benzocaine-menthol, 1 lozenge, Q2H PRN      glucose, 15 g, PRN      dextrose, 12.5 g, PRN      glucagon (rDNA), 1 mg, PRN      dextrose, 100 mL/hr, PRN      sodium chloride flush, 10 mL, PRN      promethazine, 12.5 mg, Q6H PRN    Or      ondansetron, 4 mg, Q6H PRN      polyethylene glycol, 17 g, Daily PRN      acetaminophen, 650 mg, Q6H PRN    Or      acetaminophen, 650 mg, Q6H PRN      traZODone, 75 mg, Nightly PRN      oxyCODONE, 5 mg, Q4H PRN      dextrose, 12.5 g, PRN      potassium chloride, 10 mEq, PRN      magnesium sulfate, 1,000 mg, PRN      sodium phosphate IVPB, 10 mmol, PRN    Or      sodium phosphate IVPB, 15 mmol, PRN Or      sodium phosphate IVPB, 20 mmol, PRN      dextrose 5 % and 0.45 % NaCl, , Continuous PRN      Continuous Infusions:   dextrose      insulin 2.6 Units/kg/hr (02/24/21 0750)    sodium chloride 250 mL/hr at 02/23/21 2246    dextrose 5 % and 0.45 % NaCl 150 mL/hr at 02/24/21 6442       Review of Systems  All systems reviewed. All negative except for symptoms mentioned in HPI     OBJECTIVE    BP (!) 83/53   Pulse 112   Temp 98.9 °F (37.2 °C) (Infrared)   Resp 15   Ht 5' 1\" (1.549 m)   Wt 84 lb 14 oz (38.5 kg)   SpO2 100%   BMI 16.04 kg/m²     Intake/Output Summary (Last 24 hours) at 2/24/2021 0816  Last data filed at 2/24/2021 0644  Gross per 24 hour   Intake 1660 ml   Output    Net 1660 ml       Physical examination:  General: awake alert, oriented x3, no abnormal position or movements. HEENT: normocephalic non traumatic, no exophthalmos   Neck: supple, no LN enlargement, no thyromegaly, no thyroid tenderness, no JVD. Pulm: Clear equal air entry no added sounds, no wheezing or rhonchi    CVS: S1 + S2, no murmur, no heave. Dorsalis pedis pulse palpable   Abd: soft lax, mild tenderness, no organomegaly, audible bowel sounds. Skin: warm, no lesions, no rash.   no Ulcers, no open wounds   Neuro: CN intact, muscle power normal  Psych: normal mood, and affect    Review of Laboratory Data:  I personally reviewed the following labs:   Recent Labs     02/23/21  1648 02/24/21  0610   WBC 11.0 13.1*   RBC 5.16 3.65   HGB 13.0 9.4*   HCT 43.5 28.8*   MCV 84.3 78.9*   MCH 25.2* 25.8*   MCHC 29.9* 32.6   RDW 17.2* 17.2*    317   MPV 11.2 10.6     Recent Labs     02/23/21  1648 02/23/21 2127 02/24/21  0146 02/24/21  0610   * 136 137 134   K 5.8* 4.4 4.1 4.7   CL 84* 96* 103 103   CO2 9* 13* 20* 18*   BUN 26* 25* 20 15   CREATININE 0.8 0.8 0.6 0.5   GLUCOSE 812* 365* 122* 215*   CALCIUM 10.8* 9.2 9.0 8.8   PROT 8.7*  --   --  6.3*   LABALBU 4.7  --   --  3.6   BILITOT 0.4  --   --  0.2   ALKPHOS 153*  --   --  103   AST 34*  --   --  23   ALT 53*  --   --  35*     Beta-Hydroxybutyrate   Date Value Ref Range Status   02/23/2021 >4.50 (H) 0.02 - 0.27 mmol/L Final   01/11/2021 >4.50 (H) 0.02 - 0.27 mmol/L Final   12/14/2020 >4.50 (H) 0.02 - 0.27 mmol/L Final     Lab Results   Component Value Date    LABA1C 11.2 10/18/2020    LABA1C 13.0 05/30/2020    LABA1C 15.9 03/09/2020     No results found for: TSH, T4FREE, G2INNLS, FT3, L8VCMTT, TSI, TPOABS, THGAB  Lab Results   Component Value Date    LABA1C 11.2 10/18/2020    GLUCOSE 215 02/24/2021    MALBCR 204.6 01/23/2018    LABMICR 102.3 01/23/2018    LABCREA 12 10/18/2020     Lab Results   Component Value Date    TRIG 106 01/24/2018    HDL 40 01/24/2018    LDLCALC 114 01/24/2018    CHOL 175 01/24/2018       Blood culture   Lab Results   Component Value Date    BC 5 Days no growth 11/10/2020    BC 5 Days no growth 11/01/2020       Radiology:  CT ABDOMEN PELVIS W IV CONTRAST Additional Contrast? None   Final Result   No definite CT evidence of acute pathology in the abdomen and pelvis          Medical Records/Labs/Images review:   I personally reviewed and summarized previous records   All labs and imaging were reviewed independently     Bob Espinoza, a 23 y.o.-old female seen today for inpatient diabetes management     Brittle type 1 DM admitted with DKA   · Patient's DM is very brittle and poorly controlled  due to poor compliance with insulin, diet and BS checking  · We recommend transition to the following DM regimen   · Change Lantus 15 units every morning   · Humalog 4 units with meals   · Low dose sliding scale     · Glucose check before meals and at bed time   · Will titrate insulin dose based on blood glucose trend & insulin requirement  · Pt will follow with us after discharge, Endocrine follow up visit, Wednesday 3/17 at 1:00PM     Abdominal pain/n/v  · Resolved   · Previous work up showed normal Gastric empty study and HIDA scan Previous COVID    · Recent test negative     The above issues were reviewed with the patient who understood and agreed with the plan. Thank you for allowing us to participate in the care of this patient. Please do not hesitate to contact us with any additional questions. Tiffanie Ferrer MD  Endocrinologist, [facility] End[facility]   [address] [facility] [address]   Phone: [phone]  Fax: [phone]  --------------------------------------------  An electronic signature was used to authenticate this note.  Luz Mc MD on 2/24/2021 at 8:16 AM

## 2021-02-24 NOTE — PLAN OF CARE
Problem: Pain:  Goal: Pain level will decrease  Description: Pain level will decrease  2/24/2021 0729 by Christian Kirsten  Outcome: Ongoing  2/24/2021 0106 by Nisha Butt RN  Outcome: Met This Shift  Goal: Control of acute pain  Description: Control of acute pain  2/24/2021 0729 by Christian Curtis  Outcome: Ongoing  2/24/2021 0106 by Nisha Butt RN  Outcome: Met This Shift  Goal: Control of chronic pain  Description: Control of chronic pain  2/24/2021 0729 by Christian Curtis  Outcome: Ongoing  2/24/2021 0106 by Nisha Butt RN  Outcome: Met This Shift     Problem: Anxiety:  Goal: Level of anxiety will decrease  Description: Level of anxiety will decrease  2/24/2021 0729 by Christian Kirsten  Outcome: Ongoing  2/24/2021 0106 by Nisha Butt, PAGE  Outcome: Met This Shift

## 2021-02-24 NOTE — PROGRESS NOTES
Patient IVs removed and AVS signed. Patient has all belongings. Patient discharged in stable condition via transport to the lobby.     Michelle Ramirez RN BSN

## 2021-02-24 NOTE — PLAN OF CARE
Problem: Pain:  Goal: Pain level will decrease  Description: Pain level will decrease  Outcome: Met This Shift  Goal: Control of acute pain  Description: Control of acute pain  Outcome: Met This Shift  Goal: Control of chronic pain  Description: Control of chronic pain  Outcome: Met This Shift     Problem: Anxiety:  Goal: Level of anxiety will decrease  Description: Level of anxiety will decrease  Outcome: Met This Shift

## 2021-02-24 NOTE — H&P
[facility]   History and Physical      CHIEF COMPLAINT: Nausea vomiting and abdominal pain since this morning. History of Present Illness:    She is a 79-year-old female with past medical history of DM1, recurrent DKA I , last admitted in January with similar problem, comes to ER with nausea vomiting and abdominal pain, with uncontrolled blood sugar since this morning. She says that she has been compliant with diet as well as insulin. She was discharged in January and was supposed to follow-up with Dr. Aracelis Hart. She has not been seen in his office but she says that she has talked to him on the phone and taken advice. She says that she was trying to switch her diet to vegetarian diet. And in the midst of this she started with nausea vomiting and abdominal pain/discomfort. She had past work-up which included HIDA scan which was negative as well as gastric emptying study which was normal.  When I saw her her abdominal pain has resolved. Nausea vomiting has resolved. She does give history of poor p.o. intake, trying to adjust diet. She denied any fever chills cough short of breath. Denies any diarrhea. Nausea ,vomiting abdominal pain has resolved. Denies any urinary problems. Work-up in ER with CT abdomen negative. Labs with DKA, anion gap 37, BUN and creatinine 26/0.8, potassium 5.8, calcium 10.8, borderline elevated LFTs. She was started on IV insulin and admitted in ICU. Intensivist was consulted from ER. UA without evidence of UTI. She is now admitted in ICU. REVIEW OF SYSTEMS:  no fevers, chills, cp, sob,  ha, vision/hearing changes, wt changes, hot/cold flashes, other open skin lesions, diarrhea, constipation, dysuria/hematuria unless noted in HPI. Complete ROS performed with the patient and is otherwise negative.       PMH:  Past Medical History:   Diagnosis Date    Bleeding at insertion site 1/5/2018    Common femoral artery injury, right, initial encounter 1/5/2018    Depressive disorder     DM type 1 (diabetes mellitus, type 1) ([facility] [address]        Surgical History:  Past Surgical History:   Procedure Laterality Date    ABDOMEN SURGERY N/A 5/9/2019    INCISION AND DRAINAGE OF SUPRAPUBIC ABSESS performed by Grace Mcmullen MD at [facility]      last yr       Medications Prior to Admission:    Prior to Admission medications    Medication Sig Start Date End Date Taking? Authorizing Provider   NOVOLOG FLEXPEN 100 UNIT/ML injection pen PER SLIDING SCALE. MAX OF 60 UNITS DAILY 1/26/21   Historical Provider, MD   BD PEN NEEDLE SHELBIE U/F 32G X 4 MM MISC USE AS DIRECTED UP TO 6 TIMES A DAY 1/27/21   Historical Provider, MD   linaclotide [place] ) 290 MCG CAPS capsule Take 290 mcg by mouth 1/25/21   Historical Provider, MD   traZODone (DESYREL) 50 MG tablet TAKE 1 1/2 TABLET BY MOUTH AT BEDTIME FOR SLEEP 1/25/21   Historical Provider, MD   insulin glargine (BASAGLAR KWIKPEN) 100 UNIT/ML injection pen INJECT 25 UNITS INTO THE SKIN NIGHTLY 12/31/20   Historical Provider, MD   insulin lispro, 1 Unit Dial, 100 UNIT/ML SOPN Inject 3 times daily before using carb ratrio of 1U:10G  Plus sliding scale max 50 units daily 1/25/21   Historical Provider, MD   insulin glargine (LANTUS) 100 UNIT/ML injection vial Inject 18 Units into the skin every morning  Patient taking differently: Inject 25 Units into the skin every morning  1/15/21   MIKE Ibrahim CNP   insulin lispro (HUMALOG) 100 UNIT/ML injection vial Inject 0-3 Units into the skin nightly  Patient taking differently: Inject 0-3 Units into the skin 3 times daily (before meals) Sliding scale 1/14/21   MIKE Ibrahim CNP       Allergies:    Patient has no known allergies. Social History:    reports that she has been smoking cigarettes. She has never used smokeless tobacco. She reports current drug use. Drug: Marijuana. She reports that she does not drink alcohol.     Family History:       Problem Relation Age of Onset    Diabetes Maternal Grandmother     Diabetes Paternal Grandmother     Stroke Other     Thyroid Disease Neg Hx        PHYSICAL EXAM:  Vitals:  BP (!) 85/52   Pulse 122   Temp 97.5 °F (36.4 °C) (Oral)   Resp 16   Ht 5' 1\" (1.549 m)   Wt 99 lb (44.9 kg)   SpO2 100%   BMI 18.71 kg/m²   General Appearance: alert and oriented to person, underweight, in no acute distress  Skin: warm and dry  Head: normocephalic and atraumatic  Eyes: pupils equal, round, and reactive to light, extraocular eye movements intact, conjunctivae normal  Neck: supple and non-tender without mass  Pulmonary/Chest: clear to auscultation bilaterally  Cardiovascular: normal rate, regular rhythm, normal S1 and S2  Abdomen: soft, non-tender, non-distended, normal bowel sounds, no masses or organomegaly  Extremities: no cyanosis, clubbing or edema  Musculoskeletal: normal range of motion, Neurologic: no cranial nerve deficit,  speech normal      LABS:  Recent Labs     02/23/21  1648   *   K 5.8*   CL 84*   CO2 9*   BUN 26*   CREATININE 0.8   GLUCOSE 812*   CALCIUM 10.8*       Recent Labs     02/23/21  1648   WBC 11.0   RBC 5.16   HGB 13.0   HCT 43.5   MCV 84.3   MCH 25.2*   MCHC 29.9*   RDW 17.2*      MPV 11.2       No results for input(s): POCGLU in the last 72 hours. Labs and images reviewed     Radiology: Ct Abdomen Pelvis W Iv Contrast Additional Contrast? None    Result Date: 2/23/2021  EXAMINATION: CT OF THE ABDOMEN AND PELVIS WITH CONTRAST 2/23/2021 6:16 pm TECHNIQUE: CT of the abdomen and pelvis was performed with the administration of intravenous contrast. Multiplanar reformatted images are provided for review. Dose modulation, iterative reconstruction, and/or weight based adjustment of the mA/kV was utilized to reduce the radiation dose to as low as reasonably achievable.  COMPARISON: 12/18/2020 HISTORY: ORDERING SYSTEM PROVIDED HISTORY: Nausea, vomiting, abdominal pain TECHNOLOGIST PROVIDED HISTORY: Additional Contrast?->None Reason for exam:->n/v/abd pain Reason for exam:->h/o gastroparesis Decision Support Exception->Emergency Medical Condition (MA) Prior abdominal surgery includes incision and drainage of suprapubic abscess in May 2019. Bartholin gland cyst excision last year. FINDINGS: No acute fracture or vertebral compression. Normal cardiac size without pericardial effusion. No acute lung base findings. Normal-appearing liver, gallbladder, adrenals, pancreas, and spleen. Intact normal caliber abdominal aorta and IVC. Normal-appearing kidneys and proximal ureters. The examination is limited from a paucity of anatomical intra-abdominal fat to separately identify adjacent organs and structures. The ureters are largely obscured by adjacent soft tissues. Intestinal loops are also difficult to independently assess. Distal ureters are largely obscured by adjacent structures. No definite urinary tract calculus or hydronephrosis. Intact anterior abdominal wall without hernia. No mesenteric mass or adenopathy. No retroperitoneal adenopathy. Normal-appearing stomach and duodenum. No small bowel distention in the abdomen and pelvis to suggest intestinal obstruction. No pelvic cul-de-sac free fluid. Normal-appearing urinary bladder, uterus, and adnexa. No definite rectal abnormality. Normal-appearing sigmoid colon. No gross ascites, free air, or colonic distention. No definite CT evidence of constipation. Normal-appearing cecum, terminal ileum, and appendix. No definite CT evidence of acute pathology in the abdomen and pelvis      EKG: ST otherwise normal EKG , No acute findings c/f  Earlier EKG. ASSESSMENT:      Active Problems:    DKA, type 1, not at goal [facility])  Resolved Problems:    * No resolved hospital problems. *      PLAN:  DKA I -continue IV insulin as per protocol as started from ER, monitor blood sugars, hypoglycemia protocol in place. On diabetic diet. Follow BMP and correct electrolytes as necessary. Continue to monitor in ICU. Critical care as well as endocrinology is consulted. Continue to monitor in ICU. Depression - resume as per home. Code Status: Full  DVT prophylaxis: Lovenox      Electronically signed by Min Gray MD on 2/23/2021 at 7:49 PM      NOTE: This report was transcribed using voice recognition software. Every effort was made to ensure accuracy; however, inadvertent computerized transcription errors may be present.

## 2021-02-25 ENCOUNTER — CARE COORDINATION (OUTPATIENT)
Dept: CASE MANAGEMENT | Age: 26
End: 2021-02-25

## 2021-02-25 NOTE — CARE COORDINATION
[facility] Transitions Initial Follow Up Call    Call within 2 business days of discharge: Yes    Patient: Mir Minaya Patient : 1995   MRN: [id number]  Reason for Admission: DKA  Discharge Date: 21 RARS: Readmission Risk Score: 47      Last Discharge [address] [address]       Complaint Diagnosis Description Type Department Provider    21 Hyperglycemia; Emesis; Nausea Diabetic ketoacidosis without coma associated with diabetes mellitus due to underlying condition ([facility] [address] . .. ED to Hosp-Admission (Discharged) (ADMITTED) [facility] Gurinder Magaña MD; Scott Hodge, ... First attempt to reach the patient for Care Transition call post hospital discharge. Call went straight to voicemail, voicemail full, unable to leave a message.      Follow Up  Future Appointments   Date Time Provider Joleen Silver   3/17/2021  1:00 PM Vicki Cuevas MD [facility]   3/31/2021  2:15 PM DO Rupali Ovalle [facility]   5/3/2021  1:30 PM DO [facility]       Tha Juarez RN

## 2021-02-26 ENCOUNTER — CARE COORDINATION (OUTPATIENT)
Dept: CASE MANAGEMENT | Age: 26
End: 2021-02-26

## 2021-02-26 DIAGNOSIS — E10.10 DIABETIC KETOACIDOSIS WITHOUT COMA ASSOCIATED WITH TYPE 1 DIABETES MELLITUS (HCC): Primary | ICD-10-CM

## 2021-02-26 LAB
EKG ATRIAL RATE: 119 BPM
EKG P AXIS: 87 DEGREES
EKG P-R INTERVAL: 140 MS
EKG Q-T INTERVAL: 326 MS
EKG QRS DURATION: 64 MS
EKG QTC CALCULATION (BAZETT): 458 MS
EKG R AXIS: 77 DEGREES
EKG T AXIS: 65 DEGREES
EKG VENTRICULAR RATE: 119 BPM

## 2021-02-26 PROCEDURE — 93010 ELECTROCARDIOGRAM REPORT: CPT | Performed by: INTERNAL MEDICINE

## 2021-02-26 NOTE — CARE COORDINATION
[facility] Transitions Initial Follow Up Call    Call within 2 business days of discharge: Yes    Patient: aMrisa Rodriguez Patient : 1995   MRN: [id number]  Reason for Admission: DKA  Discharge Date: 21 RARS: Readmission Risk Score: 47      Last Discharge [facility]       Complaint Diagnosis Description Type Department Provider    21 Hyperglycemia; Emesis; Nausea Diabetic ketoacidosis without coma associated with diabetes mellitus due to underlying condition ([facility] [address] . .. ED to Hosp-Admission (Discharged) (ADMITTED) [facility] ICU Chito Renteria MD; Jesus Montes, ... Challenges to be reviewed by the provider   Additional needs identified to be addressed with provider No  none    Discussed COVID-19 related testing which was not done at this time. Test results were not done. Patient informed of results, if available? N/A         Method of communication with provider : none    Advance Care Planning:   Does patient have an Advance Directive:  decision maker current. Was this a readmission? No  Patient stated reason for admission: \"dehydrated and I was eating the wrong types of fruits making my blood sugars go up. \"  Patients top risk factors for readmission: lack of knowledge about disease and medical condition    Care Transition Nurse (CTN) contacted the patient by telephone to perform post hospital discharge assessment. Verified name and  with patient as identifiers. Provided introduction to self, and explanation of the CTN role. CTN reviewed discharge instructions, medical action plan and red flags with patient who verbalized understanding. Patient given an opportunity to ask questions and does not have any further questions or concerns at this time. Were discharge instructions available to patient? Yes.  Reviewed appropriate site of care based on symptoms and resources available to patient including: PCP and Specialist. The patient agrees to contact the PCP office for questions related to their healthcare. Medication reconciliation was performed with patient, who verbalizes understanding of administration of home medications. 1111F entered. Advised obtaining a 90-day supply of all daily and as-needed medications. Discussed follow-up appointments. If no appointment was previously scheduled, appointment scheduling offered: Yes, patient declined stating she will call herself. Is follow up appointment scheduled within 7 days of discharge? No  Non-[facility] follow up appointment(s): none    Plan for follow-up call in 7-10 days based on severity of symptoms and risk factors. Plan for next call: self management-as related to DM, check blood sugars and follow up appointment-check on making of PCP appointment  CTN provided contact information for future needs. Non-face-to-face services provided:  Scheduled appointment with Specialist-endocrinology 3/17/21  Obtained and reviewed discharge summary and/or continuity of care documents  Education of patient/family/caregiver/guardian to support self-management-as related to DM    Care Transitions 24 Hour Call    Schedule Follow Up Appointment with PCP: Declined  Do you have any ongoing symptoms?: No  Do you have a copy of your discharge instructions?: Yes  Do you have all of your prescriptions and are they filled?: Yes  Have you been contacted by a [facility] Pharmacist?: No  Have you scheduled your follow up appointment?: No  Were you discharged with any Home Care or Post Acute Services: No  Do you feel like you have everything you need to keep you well at home?: Yes  Care Transitions Interventions  No Identified Needs       -Spoke with patient for initial care transition call post hospital discharge.   -Patient reports she is feeling well and that her blood sugar range has been 150-170.  -Patient denies any needs, questions, or concerns at this time and is agreeable to continued follow up from CTN.     Follow Up  Future Appointments

## 2021-03-01 LAB
BLOOD CULTURE, ROUTINE: NORMAL
CULTURE, BLOOD 2: NORMAL

## 2021-03-05 ENCOUNTER — CARE COORDINATION (OUTPATIENT)
Dept: CASE MANAGEMENT | Age: 26
End: 2021-03-05

## 2021-03-05 NOTE — CARE COORDINATION
[facility] Transitions Follow Up Call    3/5/2021    Patient: Mir Minaya  Patient : 1995   MRN: [id number]  Reason for Admission: DKA   Discharge Date: 21 RARS: Readmission Risk Score: 54    Attempted to reach the patient for follow up Care Transition call post hospital discharge. Voicemail box is full, unable to leave a message.      Follow Up  Future Appointments   Date Time Provider Joleen Silver   3/17/2021  1:00 PM Vicki Cuevas MD [facility]   3/31/2021  2:15 PM DO Rupali Ovalle [facility]   5/3/2021  1:30 PM DO Rosa Torres [facility]       Tha Juarez RN

## 2021-03-12 ENCOUNTER — CARE COORDINATION (OUTPATIENT)
Dept: CASE MANAGEMENT | Age: 26
End: 2021-03-12

## 2021-03-12 NOTE — CARE COORDINATION
[facility] 45 Transitions Follow Up Call    3/12/2021    Patient: Royer House  Patient : 1995   MRN: [id number]  Reason for Admission: DKA   Discharge Date: 21 RARS: Readmission Risk Score: 54    Attempted to reach the patient for final Care Transition call post hospital discharge. Unable to leave a message, voicemail box full. Will sign off.        Follow Up  Future Appointments   Date Time Provider Joleen Silver   3/17/2021  1:00 PM Rama Nation MD [facility]   3/31/2021  2:15 PM DO Rupali Mccollum [facility]   5/3/2021  1:30 PM DO Rosa Johnson [facility]       Shirley Edwards RN

## 2021-03-17 ENCOUNTER — VIRTUAL VISIT (OUTPATIENT)
Dept: ENDOCRINOLOGY | Age: 26
End: 2021-03-17
Payer: COMMERCIAL

## 2021-03-17 DIAGNOSIS — E10.69 TYPE 1 DIABETES MELLITUS WITH OTHER SPECIFIED COMPLICATION (HCC): Primary | ICD-10-CM

## 2021-03-17 DIAGNOSIS — E55.9 VITAMIN D DEFICIENCY: ICD-10-CM

## 2021-03-17 PROCEDURE — G8427 DOCREV CUR MEDS BY ELIG CLIN: HCPCS | Performed by: INTERNAL MEDICINE

## 2021-03-17 PROCEDURE — 3046F HEMOGLOBIN A1C LEVEL >9.0%: CPT | Performed by: INTERNAL MEDICINE

## 2021-03-17 PROCEDURE — G8419 CALC BMI OUT NRM PARAM NOF/U: HCPCS | Performed by: INTERNAL MEDICINE

## 2021-03-17 PROCEDURE — G8484 FLU IMMUNIZE NO ADMIN: HCPCS | Performed by: INTERNAL MEDICINE

## 2021-03-17 PROCEDURE — 2022F DILAT RTA XM EVC RTNOPTHY: CPT | Performed by: INTERNAL MEDICINE

## 2021-03-17 PROCEDURE — 1111F DSCHRG MED/CURRENT MED MERGE: CPT | Performed by: INTERNAL MEDICINE

## 2021-03-17 PROCEDURE — 4004F PT TOBACCO SCREEN RCVD TLK: CPT | Performed by: INTERNAL MEDICINE

## 2021-03-17 PROCEDURE — 99214 OFFICE O/P EST MOD 30 MIN: CPT | Performed by: INTERNAL MEDICINE

## 2021-03-17 RX ORDER — PEN NEEDLE, DIABETIC 32GX 5/32"
NEEDLE, DISPOSABLE MISCELLANEOUS
Qty: 300 EACH | Refills: 5 | Status: SHIPPED
Start: 2021-03-17 | End: 2021-11-17

## 2021-03-17 NOTE — PROGRESS NOTES
[address] [place] [address] Department of Endocrinology Diabetes and Metabolism   [address] [facility] [address]   Phone: [phone]  Fax: [phone]    Date of Service: 3/17/2021  Primary Care Physician: Razia Klein DO. Provider: Becki Slade MD    Reason for the visit:  Type 1 DM, vitD deficiency      History of Present Illness: The history is provided by the patient. No  was used. Accuracy of the patient data is excellent. Cabrera Prasad is a very pleasant 23 y.o. female seen today for follow up visit     Cabrera Prasad was diagnosed with diabetes at age 8 and currently on insulin pump (started 8/2020)   Basaglar 25 units at night, humalog 1:10 + ss 1:50>150   Pt has insulin pump at home, but not using it (she doesn't like anything attached to her body)   The patient has been checking BS 4 times a day with meals and at bedtime. Pt rep[orted better readings recently. She is cure for A1c, last A1c was high in 10/2020 (11.2%)   Her diabetes complicated with gastroparesis   I reviewed current medications and the patient has no issues with diabetes medications  Cabrera Prasad is due for eye exam   The patient performs her own feet care and doesn't see podiatry service   Microvascular complications: + Neuropathy. No Retinopathy, No Nephropathy   Macrovascular complications: no CAD, PVD, or Stroke  The patient receives Flushot every year. She has not received the pneumonia vaccine.     PAST MEDICAL HISTORY   Past Medical History:   Diagnosis Date    Bleeding at insertion site 1/5/2018    Common femoral artery injury, right, initial encounter 1/5/2018    Depressive disorder     [facility]      PAST SURGICAL HISTORY   Past Surgical History:   Procedure Laterality Date    ABDOMEN SURGERY N/A 5/9/2019    INCISION AND DRAINAGE OF SUPRAPUBIC ABSESS performed by Daniel Reina MD at [address] [place] [address] EXCISION      last yr     SOCIAL HISTORY   Tobacco:   reports that she has been smoking cigarettes. She has never used smokeless tobacco.  Alcol:   reports no history of alcohol use. Illicit Drugs:   reports current drug use. Drug: Marijuana. FAMILY HISTORY   Family History   Problem Relation Age of Onset    Diabetes Maternal Grandmother     Diabetes Paternal Grandmother     Stroke Other     Thyroid Disease Neg Hx      ALLERGIES AND DRUG REACTIONS   No Known Allergies    CURRENT MEDICATIONS     Current Outpatient Medications   Medication Sig Dispense Refill    Insulin Pen Needle (BD PEN NEEDLE SHELBIE U/F) 32G X 4 MM MISC Uses with insulin 4 times a day 300 each 5    insulin glargine (BASAGLAR KWIKPEN) 100 UNIT/ML injection pen Inject 25 Units into the skin nightly 5 pen 3    insulin lispro, 1 Unit Dial, 100 UNIT/ML SOPN Inject 3 times daily before using carb ratrio of 1U:10G  Plus sliding scale max 50 units daily 4 pen 2    NOVOLOG FLEXPEN 100 UNIT/ML injection pen PER SLIDING SCALE. MAX OF 60 UNITS DAILY      linaclotide (LINZESS) 290 MCG CAPS capsule Take 290 mcg by mouth daily       traZODone (DESYREL) 50 MG tablet TAKE 1 1/2 TABLET BY MOUTH AT BEDTIME FOR SLEEP      insulin lispro (HUMALOG) 100 UNIT/ML injection vial Inject 0-3 Units into the skin nightly (Patient taking differently: Inject 0-3 Units into the skin 3 times daily (before meals) Sliding scale) 1 vial 3     No current facility-administered medications for this visit. Review of Systems  Constitutional: No fever, no chills, no diaphoresis, no generalized weakness. HEENT: No blurred vision, No sore throat, no ear pain, no hair loss  Neck: denied any neck swelling, difficulty swallowing,   Cardio-pulmonary: No CP, SOB or palpitation, No orthopnea or PND. No cough or wheezing. GI: No N/V/D, no constipation, No abdominal pain, no melena or hematochezia   : Denied any dysuria, hematuria, flank pain, discharge, or incontinence. 02/24/2021 10:10 AM    GFRAA >60 02/24/2021 10:10 AM      No results found for: TSH, T4FREE, S0VPNBY, FT3, V0MRRQQ, TSI, TPOABS, THGAB  Lab Results   Component Value Date    LABA1C 11.2 10/18/2020    GLUCOSE 179 02/24/2021    MALBCR 204.6 01/23/2018    LABMICR 102.3 01/23/2018    LABCREA 12 10/18/2020     Lab Results   Component Value Date    CHOL 175 01/24/2018    TRIG 106 01/24/2018    HDL 40 01/24/2018     No results found for: Jamal Lagos, a 23 y.o.-old female seen in for the following issues     Diabetes Mellitus Type 1    · Pt's DM is uncontrolled due to poor compliance with diabetic diet and missing insulin   · Change DM regimen to:   · Basaglar 20 units at night, humalog 1:10 + ss 1:50>150   · The patient has insulin pump at home, but not using it (she doesn't like anything attached to her body)   · Pt to bring her BG readings in few days for review   · Discussed with patient A1c and blood sugar goals   · Patient will need routine diabetes maintenance and prevention  · Ordered labs     Hypoglycemia   · Better now   · Encourage using CGM (pt not interested in using insulin pump)     vitD deficiency   · Continue  vitD supplement     I personally reviewed external notes from PCP and other patient's care team providers, and personally interpreted labs associated with the above diagnosis. I also ordered labs to further assess and manage the above addressed medical conditions    Return in about 3 months (around 6/17/2021) for DM type 2, VitD deficiency. The above issues were reviewed with the patient who understood and agreed with the plan. Thank you for allowing us to participate in the care of this patient. Please do not hesitate to contact us with any additional questions. Diagnosis Orders   1.  Type 1 diabetes mellitus with other specified complication (HCC)  Hemoglobin A1C    Microalbumin / Creatinine Urine Ratio    Lipid Panel    TSH without Reflex Comprehensive Metabolic Panel   2. Vitamin D deficiency  Vitamin D 25 Hydroxy    Comprehensive Metabolic Panel     Noe Blood MD  Endocrinologist, [facility])   [address] [facility], [address] [place] [address],[address] [id number]   Phone: [phone]  Fax: [phone]  -------------------------   An electronic signature was used to authenticate this note. Kalli Grewal MD on 3/17/2021 at 7:59 PM  This visit was performed as a virtual video visit using a synchronous, two-way, audio-video telehealth technology platform  This Virtual  Visit was conducted, with patient's consent, to reduce the patient's risk of exposure to COVID-19 and provide continuity of care.

## 2021-03-17 NOTE — PROGRESS NOTES
Dai Foster was read the following message We want to confirm that, for purposes of billing, this is a virtual visit with your provider for which we will submit a claim for reimbursement with your insurance company. You will be responsible for any copays, coinsurance amounts or other amounts not covered by your insurance company. If you do not accept this, unfortunately we will not be able to schedule or proceed with a virtual visit with the provider. Do you accept? Rosa Gallegos responded Yes .

## 2021-05-04 ENCOUNTER — VIRTUAL VISIT (OUTPATIENT)
Dept: PRIMARY CARE CLINIC | Age: 26
End: 2021-05-04
Payer: COMMERCIAL

## 2021-05-04 DIAGNOSIS — E43 SEVERE PROTEIN-CALORIE MALNUTRITION (HCC): ICD-10-CM

## 2021-05-04 DIAGNOSIS — E10.65 UNCONTROLLED TYPE 1 DIABETES MELLITUS WITH HYPERGLYCEMIA (HCC): Primary | ICD-10-CM

## 2021-05-04 DIAGNOSIS — K31.84 GASTROPARESIS: ICD-10-CM

## 2021-05-04 PROCEDURE — G8427 DOCREV CUR MEDS BY ELIG CLIN: HCPCS | Performed by: INTERNAL MEDICINE

## 2021-05-04 PROCEDURE — G8419 CALC BMI OUT NRM PARAM NOF/U: HCPCS | Performed by: INTERNAL MEDICINE

## 2021-05-04 PROCEDURE — 4004F PT TOBACCO SCREEN RCVD TLK: CPT | Performed by: INTERNAL MEDICINE

## 2021-05-04 PROCEDURE — 99213 OFFICE O/P EST LOW 20 MIN: CPT | Performed by: INTERNAL MEDICINE

## 2021-05-04 PROCEDURE — 3046F HEMOGLOBIN A1C LEVEL >9.0%: CPT | Performed by: INTERNAL MEDICINE

## 2021-05-04 PROCEDURE — 2022F DILAT RTA XM EVC RTNOPTHY: CPT | Performed by: INTERNAL MEDICINE

## 2021-05-19 NOTE — ED PROVIDER NOTES
HPI:  10/11/20, Time: 10:26 PM EDT         Markus Barillas is a 27 y.o. female presenting to the ED for feeling weak and reports falling , beginning days ago. The complaint has been persistent, moderate in severity, and worsened by movement of right hip. Patient reports that she was walking and had gotten weak and fell she landed on her hip. Patient has history of being COVID positive. Patient was seen here yesterday for her symptoms. Patient reporting feeling weaker and reporting some visual changes. Patient reporting she is walking and she believes she got weak and fell. She does not believe she hit her head but she does complain of neck pain. Patient reporting lower abdominal pain she has had for months. Patient reporting no diarrhea she reports no vaginal bleeding or discharge. Patient reports she has had some vomiting. She reports no urinary frequency. There is no productive cough she reports no fever    ROS:   Pertinent positives and negatives are stated within HPI, all other systems reviewed and are negative.  --------------------------------------------- PAST HISTORY ---------------------------------------------  Past Medical History:  has a past medical history of Bleeding at insertion site, Common femoral artery injury, right, initial encounter, and DM type 1 (diabetes mellitus[facility] Past Surgical History:  has a past surgical history that includes Abdomen surgery (N/A, 5/9/2019) and Bartholin gland cyst excision. Social History:  reports that she has never smoked. She has never used smokeless tobacco. She reports that she does not drink alcohol or use drugs. Family History: family history includes Diabetes in her maternal grandmother and paternal grandmother; Stroke in an other family member. The patients home medications have been reviewed. Allergies: Patient has no known allergies.     ---------------------------------------------------PHYSICAL Pt called again, please follow up when available EXAM--------------------------------------    Constitutional/General: Alert and oriented x3,   Head: Normocephalic and atraumatic  Eyes: PERRL, EOMI  Mouth: Oropharynx clear, handling secretions, no trismus  Neck: Supple,  tender to palpation in the midline, no stridor, no crepitus, no meningeal signs  Pulmonary: Lungs clear to auscultation bilaterally, no wheezes, rales, or rhonchi. Not in respiratory distress  Cardiovascular:  Regular rate. Regular rhythm. No murmurs, gallops, or rubs. 2+ distal pulses  Chest: no chest wall tenderness  Abdomen: Soft. Tender lower abdomen non distended. Patient states this is chronic and she is had this pain since February +BS. No rebound, guarding, or rigidity. No pulsatile masses appreciated. Musculoskeletal: Moves all extremities x 4. Tenderness to thoracic and lumbar spine as well as right posterior hip warm and well perfused, no clubbing, cyanosis, or edema. Capillary refill <3 seconds  Skin: warm and dry. No rashes. Neurologic: GCS 15, CN 2-12 grossly intact, no focal deficits, symmetric strength 5/5 in the upper and lower extremities bilaterally  Psych: Normal Affect    -------------------------------------------------- RESULTS -------------------------------------------------  I have personally reviewed all laboratory and imaging results for this patient. Results are listed below.      LABS:  Results for orders placed or performed during the hospital encounter of 10/11/20   CBC auto differential   Result Value Ref Range    WBC 6.0 4.5 - 11.5 E9/L    RBC 4.13 3.50 - 5.50 E12/L    Hemoglobin 11.4 (L) 11.5 - 15.5 g/dL    Hematocrit 34.6 34.0 - 48.0 %    MCV 83.8 80.0 - 99.9 fL    MCH 27.6 26.0 - 35.0 pg    MCHC 32.9 32.0 - 34.5 %    RDW 12.8 11.5 - 15.0 fL    Platelets 004 271 - 137 E9/L    MPV 11.3 7.0 - 12.0 fL    Neutrophils % 57.1 43.0 - 80.0 %    Immature Granulocytes % 0.3 0.0 - 5.0 %    Lymphocytes % 35.9 20.0 - 42.0 %    Monocytes % 6.0 2.0 - 12.0 % Eosinophils % 0.2 0.0 - 6.0 %    Basophils % 0.5 0.0 - 2.0 %    Neutrophils Absolute 3.42 1.80 - 7.30 E9/L    Immature Granulocytes # 0.02 E9/L    Lymphocytes Absolute 2.15 1.50 - 4.00 E9/L    Monocytes Absolute 0.36 0.10 - 0.95 E9/L    Eosinophils Absolute 0.01 (L) 0.05 - 0.50 E9/L    Basophils Absolute 0.03 0.00 - 0.20 E9/L   Comprehensive Metabolic Panel   Result Value Ref Range    Sodium 135 132 - 146 mmol/L    Potassium 4.2 3.5 - 5.0 mmol/L    Chloride 95 (L) 98 - 107 mmol/L    CO2 13 (L) 22 - 29 mmol/L    Anion Gap 27 (H) 7 - 16 mmol/L    Glucose 352 (H) 74 - 99 mg/dL    BUN 12 6 - 20 mg/dL    CREATININE 0.5 0.5 - 1.0 mg/dL    GFR Non-African American >60 >=60 mL/min/1.73    GFR African American >60     Calcium 9.3 8.6 - 10.2 mg/dL    Total Protein 7.0 6.4 - 8.3 g/dL    Alb 3.9 3.5 - 5.2 g/dL    Total Bilirubin 0.5 0.0 - 1.2 mg/dL    Alkaline Phosphatase 83 35 - 104 U/L    ALT 19 0 - 32 U/L    AST 21 0 - 31 U/L   Troponin   Result Value Ref Range    Troponin <0.01 0.00 - 0.03 ng/mL   Lipase   Result Value Ref Range    Lipase 7 (L) 13 - 60 U/L   Lactic Acid, Plasma   Result Value Ref Range    Lactic Acid 1.1 0.5 - 2.2 mmol/L   D-Dimer, Quantitative   Result Value Ref Range    D-Dimer, Quant <200 ng/mL DDU   PH, VENOUS   Result Value Ref Range    pH, Christoph 7.26 (L) 7.35 - 7.45   POCT Glucose   Result Value Ref Range    Meter Glucose 336 (H) 74 - 99 mg/dL       RADIOLOGY:  Interpreted by Radiologist.  CT HEAD WO CONTRAST   Final Result   No acute intracranial hemorrhage. No cervical fracture or subluxation. CT CERVICAL SPINE WO CONTRAST   Final Result   No acute intracranial hemorrhage. No cervical fracture or subluxation. XR CHEST PORTABLE   Final Result   No acute process. XR HIP RIGHT (2-3 VIEWS)   Final Result   No fracture. XR LUMBAR SPINE (MIN 4 VIEWS)   Final Result   No fracture.          XR THORACIC SPINE (3 VIEWS)   Final Result   No acute abnormality of the thoracic spine EKG:  This EKG is signed and interpreted by me. Rate: 112  Rhythm: sinus tachycardia  Interpretation: non-specific EKG  Comparison: compared to previous          ------------------------- NURSING NOTES AND VITALS REVIEWED ---------------------------   The nursing notes within the ED encounter and vital signs as below have been reviewed by myself. BP (!) 137/102   Pulse 110   Resp 19   Ht 5' 1\" (1.549 m)   Wt 88 lb (39.9 kg)   LMP  (LMP Unknown)   SpO2 100%   BMI 16.63 kg/m²   Oxygen Saturation Interpretation: Normal    The patients available past medical records and past encounters were reviewed.         ------------------------------ ED COURSE/MEDICAL DECISION MAKING----------------------  Medications   dextrose 50 % IV solution (has no administration in time range)   potassium chloride 10 mEq/100 mL IVPB (Peripheral Line) (has no administration in time range)   magnesium sulfate 1 g in dextrose 5% 100 mL IVPB (has no administration in time range)   sodium phosphate 10 mmol in dextrose 5 % 250 mL IVPB (has no administration in time range)     Or   sodium phosphate 15 mmol in dextrose 5 % 250 mL IVPB (has no administration in time range)     Or   sodium phosphate 20 mmol in dextrose 5 % 500 mL IVPB (has no administration in time range)   0.9 % sodium chloride bolus ( Intravenous New Bag 10/11/20 2342)     Followed by   0.9 % sodium chloride infusion (has no administration in time range)   dextrose 5 % and 0.45 % sodium chloride infusion (has no administration in time range)   insulin regular (HUMULIN R;NOVOLIN R) 100 Units in sodium chloride 0.9 % 100 mL infusion (0.1 Units/kg/hr × 39.9 kg Intravenous New Bag 10/11/20 8377)   ondansetron (ZOFRAN) injection 4 mg (has no administration in time range)   0.9 % sodium chloride bolus (0 mLs Intravenous Stopped 10/11/20 2342)   morphine (PF) injection 2 mg (2 mg Intravenous Given 10/11/20 2631)             Medical Decision Making:    Patient arrived here feeling weak she had fallen she may have struck her head. She complained of neck and back pain. Patient also complained of hip pain. Patient does have a history of diabetes. She was also diagnosed with COVID on October 8. Patient afebrile and in no respiratory distress. Patient is not hypoxic. Patient labs reviewed patient is noted to be in DKA. Patient needs to be admitted to the ICU. There are no available ICU beds here in our hospital.  Patient will be transferred to [facility] ICU for further treatment I did speak to the ICU attending as well as on-call internal medicine. Re-Evaluations:             Re-evaluation. Patients symptoms show no change  Patient's vital signs noted. Patient neurologically intact. Patient labs reviewed and noted to be in DKA. Patient was started on IV fluids she was also ordered insulin drip  Patient made aware of findings and plan. Patient requesting pain for her hip as well as her back. X-rays are negative. Patient moving all extremities. Consultations:             Spoke to Dr Richy Maldonado and will except at [facility] ICU I did also speak to [facility] ICU attending and will except at [facility]:     Please note that the withdrawal or failure to initiate urgent interventions for this patient would likely result in a life threatening deterioration or permanent disability. Accordingly this patient received 30 minutes of critical care time, excluding separately billable procedures. This patient's ED course included: a personal history and physicial eaxmination    This patient has been closely monitored during their ED course. Counseling: The emergency provider has spoken with the patient and discussed todays results, in addition to providing specific details for the plan of care and counseling regarding the diagnosis and prognosis. Questions are answered at this time and they are agreeable with the plan. --------------------------------- IMPRESSION AND DISPOSITION ---------------------------------    IMPRESSION  1. General weakness    2. Injury of head, initial encounter    3. Visual disturbance    4. Neck sprain, initial encounter    5. Thoracic back sprain, initial encounter    6. Contusion of right hip, initial encounter    7. Diabetic ketoacidosis without coma associated with type 1 diabetes mellitus ([facility]    8. COVID-19 virus infection        DISPOSITION  Disposition: transfer to [facility] ICU  Patient condition is stable        NOTE: This report was transcribed using voice recognition software.  Every effort was made to ensure accuracy; however, inadvertent computerized transcription errors may be present          Alana Catalan MD  10/11/20 9147

## 2021-07-25 ENCOUNTER — HOSPITAL ENCOUNTER (INPATIENT)
Age: 26
LOS: 1 days | Discharge: HOME | DRG: 420 | End: 2021-07-26
Attending: EMERGENCY MEDICINE | Admitting: INTERNAL MEDICINE
Payer: COMMERCIAL

## 2021-07-25 ENCOUNTER — APPOINTMENT (OUTPATIENT)
Dept: GENERAL RADIOLOGY | Age: 26
DRG: 420 | End: 2021-07-25
Payer: COMMERCIAL

## 2021-07-25 DIAGNOSIS — M54.50 ACUTE MIDLINE LOW BACK PAIN WITHOUT SCIATICA: ICD-10-CM

## 2021-07-25 DIAGNOSIS — R11.2 NON-INTRACTABLE VOMITING WITH NAUSEA, UNSPECIFIED VOMITING TYPE: ICD-10-CM

## 2021-07-25 DIAGNOSIS — E10.10 TYPE 1 DIABETES MELLITUS WITH KETOACIDOSIS WITHOUT COMA (HCC): Primary | ICD-10-CM

## 2021-07-25 LAB
ALBUMIN SERPL-MCNC: 4.3 G/DL (ref 3.5–5.2)
ALP BLD-CCNC: 135 U/L (ref 35–104)
ALT SERPL-CCNC: 42 U/L (ref 0–32)
ANION GAP SERPL CALCULATED.3IONS-SCNC: 23 MMOL/L (ref 7–16)
AST SERPL-CCNC: 45 U/L (ref 0–31)
BACTERIA: NORMAL /HPF
BASOPHILS ABSOLUTE: 0.05 E9/L (ref 0–0.2)
BASOPHILS RELATIVE PERCENT: 0.6 % (ref 0–2)
BETA-HYDROXYBUTYRATE: >4.5 MMOL/L (ref 0.02–0.27)
BILIRUB SERPL-MCNC: 0.4 MG/DL (ref 0–1.2)
BILIRUBIN URINE: NEGATIVE
BLOOD, URINE: NEGATIVE
BUN BLDV-MCNC: 24 MG/DL (ref 6–20)
CALCIUM SERPL-MCNC: 9.9 MG/DL (ref 8.6–10.2)
CHLORIDE BLD-SCNC: 85 MMOL/L (ref 98–107)
CHP ED QC CHECK: NORMAL
CHP ED QC CHECK: YES
CLARITY: CLEAR
CO2: 15 MMOL/L (ref 22–29)
COLOR: ABNORMAL
CREAT SERPL-MCNC: 0.7 MG/DL (ref 0.5–1)
EOSINOPHILS ABSOLUTE: 0.04 E9/L (ref 0.05–0.5)
EOSINOPHILS RELATIVE PERCENT: 0.4 % (ref 0–6)
EPITHELIAL CELLS, UA: NORMAL /HPF
GFR AFRICAN AMERICAN: >60
GFR NON-AFRICAN AMERICAN: >60 ML/MIN/1.73
GLUCOSE BLD-MCNC: 492 MG/DL
GLUCOSE BLD-MCNC: 702 MG/DL (ref 74–99)
GLUCOSE BLD-MCNC: ABNORMAL MG/DL
GLUCOSE URINE: >=1000 MG/DL
HCG, URINE, POC: NEGATIVE
HCT VFR BLD CALC: 39.9 % (ref 34–48)
HEMOGLOBIN: 13.5 G/DL (ref 11.5–15.5)
IMMATURE GRANULOCYTES #: 0.03 E9/L
IMMATURE GRANULOCYTES %: 0.3 % (ref 0–5)
KETONES, URINE: >=80 MG/DL
LACTIC ACID: 1.4 MMOL/L (ref 0.5–2.2)
LEUKOCYTE ESTERASE, URINE: ABNORMAL
LIPASE: 27 U/L (ref 13–60)
LYMPHOCYTES ABSOLUTE: 3.53 E9/L (ref 1.5–4)
LYMPHOCYTES RELATIVE PERCENT: 39.2 % (ref 20–42)
Lab: NORMAL
MCH RBC QN AUTO: 27.8 PG (ref 26–35)
MCHC RBC AUTO-ENTMCNC: 33.8 % (ref 32–34.5)
MCV RBC AUTO: 82.3 FL (ref 80–99.9)
METER GLUCOSE: 492 MG/DL (ref 74–99)
METER GLUCOSE: >500 MG/DL (ref 74–99)
METER GLUCOSE: >500 MG/DL (ref 74–99)
MONOCYTES ABSOLUTE: 0.34 E9/L (ref 0.1–0.95)
MONOCYTES RELATIVE PERCENT: 3.8 % (ref 2–12)
NEGATIVE QC PASS/FAIL: NORMAL
NEUTROPHILS ABSOLUTE: 5.01 E9/L (ref 1.8–7.3)
NEUTROPHILS RELATIVE PERCENT: 55.7 % (ref 43–80)
NITRITE, URINE: NEGATIVE
PDW BLD-RTO: 13.7 FL (ref 11.5–15)
PH UA: 6 (ref 5–9)
PH VENOUS: 7.26 (ref 7.35–7.45)
PLATELET # BLD: 300 E9/L (ref 130–450)
PMV BLD AUTO: 10.8 FL (ref 7–12)
POSITIVE QC PASS/FAIL: NORMAL
POTASSIUM REFLEX MAGNESIUM: 5.9 MMOL/L (ref 3.5–5)
PROTEIN UA: NEGATIVE MG/DL
RBC # BLD: 4.85 E12/L (ref 3.5–5.5)
RBC UA: NORMAL /HPF (ref 0–2)
SODIUM BLD-SCNC: 123 MMOL/L (ref 132–146)
SPECIFIC GRAVITY UA: 1.01 (ref 1–1.03)
TOTAL PROTEIN: 8.3 G/DL (ref 6.4–8.3)
UROBILINOGEN, URINE: 0.2 E.U./DL
WBC # BLD: 9 E9/L (ref 4.5–11.5)
WBC UA: NORMAL /HPF (ref 0–5)

## 2021-07-25 PROCEDURE — 82010 KETONE BODYS QUAN: CPT

## 2021-07-25 PROCEDURE — 85025 COMPLETE CBC W/AUTO DIFF WBC: CPT

## 2021-07-25 PROCEDURE — 99220 PR INITIAL OBSERVATION CARE/DAY 70 MINUTES: CPT | Performed by: INTERNAL MEDICINE

## 2021-07-25 PROCEDURE — 2580000003 HC RX 258: Performed by: STUDENT IN AN ORGANIZED HEALTH CARE EDUCATION/TRAINING PROGRAM

## 2021-07-25 PROCEDURE — 87081 CULTURE SCREEN ONLY: CPT

## 2021-07-25 PROCEDURE — 6360000002 HC RX W HCPCS: Performed by: STUDENT IN AN ORGANIZED HEALTH CARE EDUCATION/TRAINING PROGRAM

## 2021-07-25 PROCEDURE — 99285 EMERGENCY DEPT VISIT HI MDM: CPT

## 2021-07-25 PROCEDURE — 71045 X-RAY EXAM CHEST 1 VIEW: CPT

## 2021-07-25 PROCEDURE — 96376 TX/PRO/DX INJ SAME DRUG ADON: CPT

## 2021-07-25 PROCEDURE — 96361 HYDRATE IV INFUSION ADD-ON: CPT

## 2021-07-25 PROCEDURE — 96365 THER/PROPH/DIAG IV INF INIT: CPT

## 2021-07-25 PROCEDURE — 96375 TX/PRO/DX INJ NEW DRUG ADDON: CPT

## 2021-07-25 PROCEDURE — G0378 HOSPITAL OBSERVATION PER HR: HCPCS

## 2021-07-25 PROCEDURE — 82800 BLOOD PH: CPT

## 2021-07-25 PROCEDURE — 82962 GLUCOSE BLOOD TEST: CPT

## 2021-07-25 PROCEDURE — 2000000000 HC ICU R&B

## 2021-07-25 PROCEDURE — 80053 COMPREHEN METABOLIC PANEL: CPT

## 2021-07-25 PROCEDURE — 83690 ASSAY OF LIPASE: CPT

## 2021-07-25 PROCEDURE — 81001 URINALYSIS AUTO W/SCOPE: CPT

## 2021-07-25 PROCEDURE — 83605 ASSAY OF LACTIC ACID: CPT

## 2021-07-25 PROCEDURE — 93005 ELECTROCARDIOGRAM TRACING: CPT | Performed by: STUDENT IN AN ORGANIZED HEALTH CARE EDUCATION/TRAINING PROGRAM

## 2021-07-25 PROCEDURE — 6370000000 HC RX 637 (ALT 250 FOR IP): Performed by: STUDENT IN AN ORGANIZED HEALTH CARE EDUCATION/TRAINING PROGRAM

## 2021-07-25 RX ORDER — KETOROLAC TROMETHAMINE 30 MG/ML
15 INJECTION, SOLUTION INTRAMUSCULAR; INTRAVENOUS ONCE
Status: COMPLETED | OUTPATIENT
Start: 2021-07-25 | End: 2021-07-25

## 2021-07-25 RX ORDER — SODIUM CHLORIDE 450 MG/100ML
INJECTION, SOLUTION INTRAVENOUS CONTINUOUS
Status: DISCONTINUED | OUTPATIENT
Start: 2021-07-25 | End: 2021-07-26

## 2021-07-25 RX ORDER — SODIUM CHLORIDE 9 MG/ML
INJECTION, SOLUTION INTRAVENOUS CONTINUOUS
Status: DISCONTINUED | OUTPATIENT
Start: 2021-07-25 | End: 2021-07-26 | Stop reason: HOSPADM

## 2021-07-25 RX ORDER — MAGNESIUM SULFATE 1 G/100ML
1000 INJECTION INTRAVENOUS PRN
Status: DISCONTINUED | OUTPATIENT
Start: 2021-07-25 | End: 2021-07-26 | Stop reason: HOSPADM

## 2021-07-25 RX ORDER — 0.9 % SODIUM CHLORIDE 0.9 %
1000 INTRAVENOUS SOLUTION INTRAVENOUS ONCE
Status: COMPLETED | OUTPATIENT
Start: 2021-07-25 | End: 2021-07-25

## 2021-07-25 RX ORDER — DEXTROSE, SODIUM CHLORIDE, AND POTASSIUM CHLORIDE 5; .45; .15 G/100ML; G/100ML; G/100ML
INJECTION INTRAVENOUS CONTINUOUS PRN
Status: DISCONTINUED | OUTPATIENT
Start: 2021-07-25 | End: 2021-07-26 | Stop reason: SDUPTHER

## 2021-07-25 RX ORDER — POLYETHYLENE GLYCOL 3350 17 G/17G
17 POWDER, FOR SOLUTION ORAL DAILY PRN
Status: DISCONTINUED | OUTPATIENT
Start: 2021-07-25 | End: 2021-07-26 | Stop reason: HOSPADM

## 2021-07-25 RX ORDER — DEXTROSE MONOHYDRATE 25 G/50ML
12.5 INJECTION, SOLUTION INTRAVENOUS PRN
Status: DISCONTINUED | OUTPATIENT
Start: 2021-07-25 | End: 2021-07-26 | Stop reason: HOSPADM

## 2021-07-25 RX ORDER — DEXTROSE MONOHYDRATE 25 G/50ML
12.5 INJECTION, SOLUTION INTRAVENOUS PRN
Status: DISCONTINUED | OUTPATIENT
Start: 2021-07-25 | End: 2021-07-26 | Stop reason: SDUPTHER

## 2021-07-25 RX ORDER — MAGNESIUM SULFATE 1 G/100ML
1000 INJECTION INTRAVENOUS PRN
Status: DISCONTINUED | OUTPATIENT
Start: 2021-07-25 | End: 2021-07-26 | Stop reason: SDUPTHER

## 2021-07-25 RX ORDER — DEXTROSE AND SODIUM CHLORIDE 5; .45 G/100ML; G/100ML
INJECTION, SOLUTION INTRAVENOUS CONTINUOUS PRN
Status: DISCONTINUED | OUTPATIENT
Start: 2021-07-25 | End: 2021-07-26 | Stop reason: HOSPADM

## 2021-07-25 RX ORDER — POTASSIUM CHLORIDE 7.45 MG/ML
10 INJECTION INTRAVENOUS PRN
Status: DISCONTINUED | OUTPATIENT
Start: 2021-07-25 | End: 2021-07-26 | Stop reason: HOSPADM

## 2021-07-25 RX ORDER — METOCLOPRAMIDE HYDROCHLORIDE 5 MG/ML
10 INJECTION INTRAMUSCULAR; INTRAVENOUS ONCE
Status: COMPLETED | OUTPATIENT
Start: 2021-07-25 | End: 2021-07-25

## 2021-07-25 RX ORDER — POTASSIUM CHLORIDE 7.45 MG/ML
10 INJECTION INTRAVENOUS PRN
Status: DISCONTINUED | OUTPATIENT
Start: 2021-07-25 | End: 2021-07-26 | Stop reason: SDUPTHER

## 2021-07-25 RX ORDER — 0.9 % SODIUM CHLORIDE 0.9 %
15 INTRAVENOUS SOLUTION INTRAVENOUS ONCE
Status: COMPLETED | OUTPATIENT
Start: 2021-07-25 | End: 2021-07-25

## 2021-07-25 RX ORDER — FENTANYL CITRATE 50 UG/ML
25 INJECTION, SOLUTION INTRAMUSCULAR; INTRAVENOUS ONCE
Status: COMPLETED | OUTPATIENT
Start: 2021-07-25 | End: 2021-07-25

## 2021-07-25 RX ADMIN — SODIUM CHLORIDE 1000 ML: 9 INJECTION, SOLUTION INTRAVENOUS at 20:37

## 2021-07-25 RX ADMIN — METOCLOPRAMIDE 10 MG: 5 INJECTION, SOLUTION INTRAMUSCULAR; INTRAVENOUS at 20:38

## 2021-07-25 RX ADMIN — SODIUM CHLORIDE 735 ML: 9 INJECTION, SOLUTION INTRAVENOUS at 21:50

## 2021-07-25 RX ADMIN — KETOROLAC TROMETHAMINE 15 MG: 30 INJECTION, SOLUTION INTRAMUSCULAR; INTRAVENOUS at 22:49

## 2021-07-25 RX ADMIN — FENTANYL CITRATE 25 MCG: 0.05 INJECTION, SOLUTION INTRAMUSCULAR; INTRAVENOUS at 20:46

## 2021-07-25 RX ADMIN — SODIUM CHLORIDE 1000 ML: 9 INJECTION, SOLUTION INTRAVENOUS at 20:47

## 2021-07-25 RX ADMIN — INSULIN HUMAN 5 UNITS: 100 INJECTION, SOLUTION PARENTERAL at 21:50

## 2021-07-25 RX ADMIN — SODIUM CHLORIDE 0.1 UNITS/KG/HR: 9 INJECTION, SOLUTION INTRAVENOUS at 22:03

## 2021-07-25 ASSESSMENT — PAIN DESCRIPTION - DESCRIPTORS
DESCRIPTORS: THROBBING;RADIATING
DESCRIPTORS: THROBBING;RADIATING

## 2021-07-25 ASSESSMENT — PAIN SCALES - GENERAL
PAINLEVEL_OUTOF10: 10
PAINLEVEL_OUTOF10: 7
PAINLEVEL_OUTOF10: 10
PAINLEVEL_OUTOF10: 10

## 2021-07-25 ASSESSMENT — PAIN DESCRIPTION - FREQUENCY
FREQUENCY: CONTINUOUS
FREQUENCY: CONTINUOUS

## 2021-07-25 ASSESSMENT — PAIN DESCRIPTION - PAIN TYPE
TYPE: ACUTE PAIN
TYPE: ACUTE PAIN

## 2021-07-25 ASSESSMENT — PAIN DESCRIPTION - LOCATION
LOCATION: BACK
LOCATION: ABDOMEN;BACK

## 2021-07-25 NOTE — ED NOTES
Bed: 25  Expected date:   Expected time:   Means of arrival:   Comments:  350 José Manuel Medel RN  07/25/21 7488

## 2021-07-26 VITALS
TEMPERATURE: 98.7 F | HEIGHT: 60 IN | RESPIRATION RATE: 33 BRPM | HEART RATE: 114 BPM | SYSTOLIC BLOOD PRESSURE: 109 MMHG | WEIGHT: 127.65 LBS | BODY MASS INDEX: 25.06 KG/M2 | OXYGEN SATURATION: 99 % | DIASTOLIC BLOOD PRESSURE: 71 MMHG

## 2021-07-26 LAB
ANION GAP SERPL CALCULATED.3IONS-SCNC: 10 MMOL/L (ref 7–16)
ANION GAP SERPL CALCULATED.3IONS-SCNC: 17 MMOL/L (ref 7–16)
ANION GAP SERPL CALCULATED.3IONS-SCNC: 9 MMOL/L (ref 7–16)
BUN BLDV-MCNC: 13 MG/DL (ref 6–20)
BUN BLDV-MCNC: 15 MG/DL (ref 6–20)
BUN BLDV-MCNC: 21 MG/DL (ref 6–20)
CALCIUM SERPL-MCNC: 7.9 MG/DL (ref 8.6–10.2)
CALCIUM SERPL-MCNC: 8.1 MG/DL (ref 8.6–10.2)
CALCIUM SERPL-MCNC: 8.1 MG/DL (ref 8.6–10.2)
CHLORIDE BLD-SCNC: 103 MMOL/L (ref 98–107)
CHLORIDE BLD-SCNC: 105 MMOL/L (ref 98–107)
CHLORIDE BLD-SCNC: 106 MMOL/L (ref 98–107)
CO2: 14 MMOL/L (ref 22–29)
CO2: 16 MMOL/L (ref 22–29)
CO2: 17 MMOL/L (ref 22–29)
CREAT SERPL-MCNC: 0.5 MG/DL (ref 0.5–1)
CREAT SERPL-MCNC: 0.5 MG/DL (ref 0.5–1)
CREAT SERPL-MCNC: 0.6 MG/DL (ref 0.5–1)
EKG ATRIAL RATE: 116 BPM
EKG P AXIS: 75 DEGREES
EKG P-R INTERVAL: 160 MS
EKG Q-T INTERVAL: 332 MS
EKG QRS DURATION: 62 MS
EKG QTC CALCULATION (BAZETT): 461 MS
EKG R AXIS: 62 DEGREES
EKG T AXIS: 62 DEGREES
EKG VENTRICULAR RATE: 116 BPM
GFR AFRICAN AMERICAN: >60
GFR NON-AFRICAN AMERICAN: >60 ML/MIN/1.73
GLUCOSE BLD-MCNC: 134 MG/DL (ref 74–99)
GLUCOSE BLD-MCNC: 196 MG/DL (ref 74–99)
GLUCOSE BLD-MCNC: 280 MG/DL (ref 74–99)
HBA1C MFR BLD: 15.1 % (ref 4–5.6)
HCT VFR BLD CALC: 32 % (ref 34–48)
HEMOGLOBIN: 10.6 G/DL (ref 11.5–15.5)
MAGNESIUM: 1.6 MG/DL (ref 1.6–2.6)
MAGNESIUM: 2.2 MG/DL (ref 1.6–2.6)
MAGNESIUM: 2.5 MG/DL (ref 1.6–2.6)
MCH RBC QN AUTO: 27.4 PG (ref 26–35)
MCHC RBC AUTO-ENTMCNC: 33.1 % (ref 32–34.5)
MCV RBC AUTO: 82.7 FL (ref 80–99.9)
METER GLUCOSE: 122 MG/DL (ref 74–99)
METER GLUCOSE: 129 MG/DL (ref 74–99)
METER GLUCOSE: 147 MG/DL (ref 74–99)
METER GLUCOSE: 158 MG/DL (ref 74–99)
METER GLUCOSE: 168 MG/DL (ref 74–99)
METER GLUCOSE: 174 MG/DL (ref 74–99)
METER GLUCOSE: 180 MG/DL (ref 74–99)
METER GLUCOSE: 186 MG/DL (ref 74–99)
METER GLUCOSE: 187 MG/DL (ref 74–99)
METER GLUCOSE: 192 MG/DL (ref 74–99)
METER GLUCOSE: 210 MG/DL (ref 74–99)
METER GLUCOSE: 231 MG/DL (ref 74–99)
METER GLUCOSE: 236 MG/DL (ref 74–99)
METER GLUCOSE: 266 MG/DL (ref 74–99)
METER GLUCOSE: 331 MG/DL (ref 74–99)
PDW BLD-RTO: 13.8 FL (ref 11.5–15)
PHOSPHORUS: 2.7 MG/DL (ref 2.5–4.5)
PHOSPHORUS: 3.4 MG/DL (ref 2.5–4.5)
PHOSPHORUS: 4 MG/DL (ref 2.5–4.5)
PLATELET # BLD: 258 E9/L (ref 130–450)
PMV BLD AUTO: 10.3 FL (ref 7–12)
POTASSIUM SERPL-SCNC: 4 MMOL/L (ref 3.5–5)
POTASSIUM SERPL-SCNC: 4.6 MMOL/L (ref 3.5–5)
POTASSIUM SERPL-SCNC: 4.7 MMOL/L (ref 3.5–5)
RBC # BLD: 3.87 E12/L (ref 3.5–5.5)
SODIUM BLD-SCNC: 131 MMOL/L (ref 132–146)
SODIUM BLD-SCNC: 132 MMOL/L (ref 132–146)
SODIUM BLD-SCNC: 134 MMOL/L (ref 132–146)
WBC # BLD: 9.3 E9/L (ref 4.5–11.5)

## 2021-07-26 PROCEDURE — 96375 TX/PRO/DX INJ NEW DRUG ADDON: CPT

## 2021-07-26 PROCEDURE — 85027 COMPLETE CBC AUTOMATED: CPT

## 2021-07-26 PROCEDURE — 96365 THER/PROPH/DIAG IV INF INIT: CPT

## 2021-07-26 PROCEDURE — 80048 BASIC METABOLIC PNL TOTAL CA: CPT

## 2021-07-26 PROCEDURE — 96376 TX/PRO/DX INJ SAME DRUG ADON: CPT

## 2021-07-26 PROCEDURE — 2580000003 HC RX 258: Performed by: INTERNAL MEDICINE

## 2021-07-26 PROCEDURE — 83036 HEMOGLOBIN GLYCOSYLATED A1C: CPT

## 2021-07-26 PROCEDURE — 2500000003 HC RX 250 WO HCPCS: Performed by: INTERNAL MEDICINE

## 2021-07-26 PROCEDURE — 6370000000 HC RX 637 (ALT 250 FOR IP): Performed by: STUDENT IN AN ORGANIZED HEALTH CARE EDUCATION/TRAINING PROGRAM

## 2021-07-26 PROCEDURE — 99222 1ST HOSP IP/OBS MODERATE 55: CPT | Performed by: INTERNAL MEDICINE

## 2021-07-26 PROCEDURE — 6370000000 HC RX 637 (ALT 250 FOR IP): Performed by: INTERNAL MEDICINE

## 2021-07-26 PROCEDURE — 2580000003 HC RX 258: Performed by: STUDENT IN AN ORGANIZED HEALTH CARE EDUCATION/TRAINING PROGRAM

## 2021-07-26 PROCEDURE — 96372 THER/PROPH/DIAG INJ SC/IM: CPT

## 2021-07-26 PROCEDURE — 96366 THER/PROPH/DIAG IV INF ADDON: CPT

## 2021-07-26 PROCEDURE — 6360000002 HC RX W HCPCS: Performed by: INTERNAL MEDICINE

## 2021-07-26 PROCEDURE — 84100 ASSAY OF PHOSPHORUS: CPT

## 2021-07-26 PROCEDURE — 82962 GLUCOSE BLOOD TEST: CPT

## 2021-07-26 PROCEDURE — 36415 COLL VENOUS BLD VENIPUNCTURE: CPT

## 2021-07-26 PROCEDURE — 83735 ASSAY OF MAGNESIUM: CPT

## 2021-07-26 PROCEDURE — G0378 HOSPITAL OBSERVATION PER HR: HCPCS

## 2021-07-26 PROCEDURE — 99239 HOSP IP/OBS DSCHRG MGMT >30: CPT | Performed by: INTERNAL MEDICINE

## 2021-07-26 RX ORDER — INSULIN GLARGINE 100 [IU]/ML
25 INJECTION, SOLUTION SUBCUTANEOUS NIGHTLY
Qty: 10 PEN | Refills: 3 | Status: SHIPPED | OUTPATIENT
Start: 2021-07-26 | End: 2021-08-05 | Stop reason: SDUPTHER

## 2021-07-26 RX ORDER — DEXTROSE MONOHYDRATE 25 G/50ML
12.5 INJECTION, SOLUTION INTRAVENOUS PRN
Status: DISCONTINUED | OUTPATIENT
Start: 2021-07-26 | End: 2021-07-26 | Stop reason: HOSPADM

## 2021-07-26 RX ORDER — DEXTROSE MONOHYDRATE 50 MG/ML
100 INJECTION, SOLUTION INTRAVENOUS PRN
Status: DISCONTINUED | OUTPATIENT
Start: 2021-07-26 | End: 2021-07-26 | Stop reason: HOSPADM

## 2021-07-26 RX ORDER — MORPHINE SULFATE 2 MG/ML
2 INJECTION, SOLUTION INTRAMUSCULAR; INTRAVENOUS EVERY 4 HOURS PRN
Status: DISCONTINUED | OUTPATIENT
Start: 2021-07-26 | End: 2021-07-26

## 2021-07-26 RX ORDER — LIDOCAINE 4 G/G
1 PATCH TOPICAL DAILY
Status: DISCONTINUED | OUTPATIENT
Start: 2021-07-26 | End: 2021-07-26 | Stop reason: HOSPADM

## 2021-07-26 RX ORDER — NICOTINE POLACRILEX 4 MG
15 LOZENGE BUCCAL PRN
Status: DISCONTINUED | OUTPATIENT
Start: 2021-07-26 | End: 2021-07-26 | Stop reason: HOSPADM

## 2021-07-26 RX ORDER — INSULIN LISPRO 100 [IU]/ML
INJECTION, SOLUTION INTRAVENOUS; SUBCUTANEOUS
Qty: 4 PEN | Refills: 2 | Status: SHIPPED | OUTPATIENT
Start: 2021-07-26 | End: 2021-08-24

## 2021-07-26 RX ORDER — INSULIN GLARGINE 100 [IU]/ML
25 INJECTION, SOLUTION SUBCUTANEOUS ONCE
Status: COMPLETED | OUTPATIENT
Start: 2021-07-26 | End: 2021-07-26

## 2021-07-26 RX ADMIN — POTASSIUM CHLORIDE 10 MEQ: 7.46 INJECTION, SOLUTION INTRAVENOUS at 07:50

## 2021-07-26 RX ADMIN — POTASSIUM CHLORIDE 10 MEQ: 7.46 INJECTION, SOLUTION INTRAVENOUS at 02:04

## 2021-07-26 RX ADMIN — INSULIN LISPRO 6 UNITS: 100 INJECTION, SOLUTION INTRAVENOUS; SUBCUTANEOUS at 17:12

## 2021-07-26 RX ADMIN — POTASSIUM CHLORIDE 10 MEQ: 7.46 INJECTION, SOLUTION INTRAVENOUS at 09:18

## 2021-07-26 RX ADMIN — MORPHINE SULFATE 2 MG: 2 INJECTION, SOLUTION INTRAMUSCULAR; INTRAVENOUS at 01:23

## 2021-07-26 RX ADMIN — SODIUM CHLORIDE 0.02 UNITS/KG/HR: 9 INJECTION, SOLUTION INTRAVENOUS at 00:09

## 2021-07-26 RX ADMIN — SODIUM PHOSPHATE, MONOBASIC, MONOHYDRATE 10 MMOL: 276; 142 INJECTION, SOLUTION INTRAVENOUS at 02:05

## 2021-07-26 RX ADMIN — MAGNESIUM SULFATE HEPTAHYDRATE 1000 MG: 1 INJECTION, SOLUTION INTRAVENOUS at 02:04

## 2021-07-26 RX ADMIN — DEXTROSE AND SODIUM CHLORIDE 150 ML/HR: 5; 450 INJECTION, SOLUTION INTRAVENOUS at 01:36

## 2021-07-26 RX ADMIN — SODIUM CHLORIDE: 9 INJECTION, SOLUTION INTRAVENOUS at 00:09

## 2021-07-26 RX ADMIN — MORPHINE SULFATE 2 MG: 2 INJECTION, SOLUTION INTRAMUSCULAR; INTRAVENOUS at 05:20

## 2021-07-26 RX ADMIN — INSULIN LISPRO 4 UNITS: 100 INJECTION, SOLUTION INTRAVENOUS; SUBCUTANEOUS at 12:49

## 2021-07-26 RX ADMIN — POTASSIUM CHLORIDE 10 MEQ: 7.46 INJECTION, SOLUTION INTRAVENOUS at 03:11

## 2021-07-26 RX ADMIN — POTASSIUM CHLORIDE 10 MEQ: 7.46 INJECTION, SOLUTION INTRAVENOUS at 05:12

## 2021-07-26 RX ADMIN — ENOXAPARIN SODIUM 40 MG: 40 INJECTION SUBCUTANEOUS at 08:37

## 2021-07-26 RX ADMIN — INSULIN GLARGINE 25 UNITS: 100 INJECTION, SOLUTION SUBCUTANEOUS at 11:35

## 2021-07-26 RX ADMIN — MAGNESIUM SULFATE HEPTAHYDRATE 1000 MG: 1 INJECTION, SOLUTION INTRAVENOUS at 03:11

## 2021-07-26 ASSESSMENT — ENCOUNTER SYMPTOMS
SINUS PRESSURE: 0
SHORTNESS OF BREATH: 0
COUGH: 0
EYE DISCHARGE: 0
BACK PAIN: 1
WHEEZING: 0
ABDOMINAL DISTENTION: 0
SORE THROAT: 0
DIARRHEA: 0
VOMITING: 1
EYE REDNESS: 0
NAUSEA: 1
EYE PAIN: 0

## 2021-07-26 ASSESSMENT — PAIN DESCRIPTION - PAIN TYPE
TYPE: ACUTE PAIN

## 2021-07-26 ASSESSMENT — PAIN SCALES - GENERAL
PAINLEVEL_OUTOF10: 5
PAINLEVEL_OUTOF10: 5
PAINLEVEL_OUTOF10: 0
PAINLEVEL_OUTOF10: 3
PAINLEVEL_OUTOF10: 0
PAINLEVEL_OUTOF10: 7
PAINLEVEL_OUTOF10: 7
PAINLEVEL_OUTOF10: 10
PAINLEVEL_OUTOF10: 5

## 2021-07-26 ASSESSMENT — PAIN DESCRIPTION - ORIENTATION: ORIENTATION: LOWER

## 2021-07-26 ASSESSMENT — PAIN DESCRIPTION - DESCRIPTORS
DESCRIPTORS: THROBBING;RADIATING

## 2021-07-26 ASSESSMENT — PAIN DESCRIPTION - FREQUENCY
FREQUENCY: CONTINUOUS

## 2021-07-26 ASSESSMENT — PAIN DESCRIPTION - LOCATION
LOCATION: BACK

## 2021-07-26 NOTE — CONSULTS
Critical Care Admit/Consult Note         Patient - David Camacho   MRN -  [id number]   [place] # - [de-identified]   - 1995      Date of Admission -  2021  7:55 PM  Date of evaluation -  2021  0214/0214-A   Hospital Day - 1            ADMIT/CONSULT DETAILS     Reason for Admit/Consult   DKA    Consulting [facility] Jf Beavers MD  Primary Care Physician - DO TRINI Domínguez   The patient is a 23 y.o. female with significant past medical history of type 1 diabetes mellitus, uncontrolled, DKA, depression. Presented to the emergency department due to a week of malaise, that has been gradually worsening until she developed lower abdominal pain and back pain which she called EMS for. In the ED was found she had glucose of 702, anion gap 23, potassium 5.9. She was started on DKA protocol, given fluids in the ER. She has had many episodes of DKA in the past, apparently reported to nursing staff that she had been instructed by her endocrinologist that when she does not feel well she should not take her insulin and should call 911. She had apparently done that this week, but had waited a full week until she called 911 after she had stopped her insulin.          Past Medical History         Diagnosis Date    Bleeding at insertion site 2018    Common femoral artery injury, right, initial encounter 2018    Depressive disorder     DM type 1 (diabetes mellitus, type 1) ([facility]         Past Surgical History           Procedure Laterality Date    ABDOMEN SURGERY N/A 2019    INCISION AND DRAINAGE OF SUPRAPUBIC ABSESS performed by Carey De Jesus MD at [facility]      last yr         Current Medications   Current Medications    enoxaparin  40 mg Subcutaneous Daily     dextrose 5 % and 0.45 % NaCl, dextrose, potassium chloride, magnesium sulfate, sodium phosphate IVPB **OR** sodium phosphate IVPB **OR** sodium phosphate IVPB, polyethylene glycol  IV Drips/Infusions   sodium chloride      dextrose 5 % and 0.45 % NaCl 150 mL/hr (07/26/21 0136)    insulin 0.69 Units/hr (07/26/21 0640)    sodium chloride 150 mL/hr at 07/26/21 0009     Home Medications  Medications Prior to Admission: Insulin Pen Needle (BD PEN NEEDLE SHELBIE U/F) 32G X 4 MM MISC, Uses with insulin 4 times a day  insulin glargine (BASAGLAR KWIKPEN) 100 UNIT/ML injection pen, Inject 25 Units into the skin nightly  insulin lispro, 1 Unit Dial, 100 UNIT/ML SOPN, Inject 3 times daily before using carb ratrio of 1U:10G  Plus sliding scale max 50 units daily  NOVOLOG FLEXPEN 100 UNIT/ML injection pen, PER SLIDING SCALE. MAX OF 60 UNITS DAILY  insulin lispro (HUMALOG) 100 UNIT/ML injection vial, Inject 0-3 Units into the skin nightly (Patient taking differently: Inject 0-3 Units into the skin 3 times daily (before meals) Sliding scale)    Diet/Nutrition   Diet NPO    Allergies   Patient has no known allergies. Social History   Tobacco   reports that she has never smoked. She has never used smokeless tobacco.    Alcohol     reports no history of alcohol use.     Occupational history :    Family History         Problem Relation Age of Onset    Diabetes Maternal Grandmother     Diabetes Paternal Grandmother     Stroke Other     Thyroid Disease Neg Hx        Sleep History   n/a    ROS     REVIEW OF SYSTEMS:   CONSTITUTIONAL:  negative for  fevers, chills and weight loss  EYES:  negative for  double vision and blurred vision  HEENT:  positive for  nasal congestion and sore throat  RESPIRATORY:  negative for  cough with sputum and dyspnea  CARDIOVASCULAR:  negative for  chest pain, palpitations  GASTROINTESTINAL:  negative for nausea, vomiting and diarrhea, positive for abdominal pain  GENITOURINARY:  negative for frequency and dysuria  INTEGUMENT/BREAST:  negative for rash and skin lesion(s)  HEMATOLOGIC/LYMPHATIC:  negative for easy bruising and bleeding  ALLERGIC/IMMUNOLOGIC:  negative for recurrent infections and urticaria  ENDOCRINE:  negative for weight changes and diabetic symptoms including neither polyuria nor polydipsia  MUSCULOSKELETAL: positive for  myalgias and arthralgias  NEUROLOGICAL:  negative for headaches, dizziness and numbness  BEHAVIOR/PSYCH:  negative for poor appetite and anxiety    Lines and Devices   Peripheral IVs    Mechanical Ventilation Data   VENT SETTINGS (Comprehensive)  Vent Information  SpO2: 98 %  Additional Respiratory  Assessments  Pulse: 100  Resp: 13  SpO2: 98 %  Oral Care: Mouth swabbed, Lip moisturizer applied    ABG  Lab Results   Component Value Date    PH 7.282 11/10/2020    PCO2 47.5 11/10/2020    PO2 20.8 11/10/2020    HCO3 21.9 11/10/2020    O2SAT 26.5 11/10/2020     Lab Results   Component Value Date    MODE RA 11/10/2020           Vitals    height is 5' (1.524 m) and weight is 127 lb 10.3 oz (57.9 kg). Her oral temperature is 98 °F (36.7 °C). Her blood pressure is 107/49 (abnormal) and her pulse is 100. Her respiration is 13 and oxygen saturation is 98%.        Temperature Range: Temp: 98 °F (36.7 °C) Temp  Av.9 °F (36.6 °C)  Min: 97.8 °F (36.6 °C)  Max: 98.2 °F (36.8 °C)  BP Range:  Systolic (83POQ), HSIEH:613 , Min:98 , IBS:325     Diastolic (13QWE), JYM:65, Min:49, Max:76    Pulse Range: Pulse  Av.6  Min: 100  Max: 115  Respiration Range: Resp  Avg: 15.9  Min: 13  Max: 18  Current Pulse Ox[de-identified]  SpO2: 98 %  24HR Pulse Ox Range:  SpO2  Av.6 %  Min: 98 %  Max: 100 %  Oxygen Amount and Delivery:        I/O (24 Hours)    Patient Vitals for the past 8 hrs:   BP Temp Temp src Pulse Resp SpO2 Height Weight   21 0400 (!) 107/49 98 °F (36.7 °C) Oral 100 13 98 % -- --   21 0300 (!) 100/59 -- -- 111 15 100 % -- --   21 0200 101/62 97.8 °F (36.6 °C) Oral 107 14 99 % -- --   21 0100 98/ -- -- 111 14 100 % -- --   21 0000  97.8 °F (36.6 °C) Oral 107 15 100 % -- --   21 2330 -- 97.8 °F (36.6 °C) Oral -- -- -- 5' (1.524 m) 127 lb 10.3 oz (57.9 kg)       Intake/Output Summary (Last 24 hours) at 7/26/2021 0721  Last data filed at 7/26/2021 0640  Gross per 24 hour   Intake 1162.5 ml   Output --   Net 1162.5 ml     I/O last 3 completed shifts: In: 1162.5 [I.V.:1162.5]  Out: -    Date 07/26/21 0000 - 07/26/21 2359   Shift 2159-2094 9292-7377 0918-8612 24 Hour Total   INTAKE   I.V.(mL/kg) 1162. 5(20.1)   1162. 5(20.1)   Shift Total(mL/kg) 1162. 5(20.1)   1162. 5(20.1)   OUTPUT   Shift Total(mL/kg)       Weight (kg) 57.9 57.9 57.9 57.9     Patient Vitals for the past 96 hrs (Last 3 readings):   Weight   07/25/21 2330 127 lb 10.3 oz (57.9 kg)   07/25/21 1959 108 lb (49 kg)           Exam         PHYSICAL EXAM:     General appearance - alert, well appearing, and in no distress and oriented to person, place, and time. Mental status - alert, oriented to person, place, and time, normal mood, behavior, speech, dress, motor activity, and thought processes  Eyes - pupils equal and reactive, extraocular eye movements intact, sclera anicteric  Ears - external ear normal  Nose - normal and patent, no erythema, discharge or polyps  Mouth - mucous membranes tacky, pharynx normal without lesions  Neck - supple, no significant adenopathy  Chest - clear to auscultation, no wheezes, rales or rhonchi, symmetric air entry  Heart - intermittently tachycardic regular rhythm, normal S1, S2, no murmurs, rubs, clicks or gallops  Abdomen - soft, nontender, nondistended, no masses or organomegaly  Neurological - alert, oriented, normal speech, no focal findings or movement disorder noted  Extremities - peripheral pulses normal, no clubbing or cyanosis. No edema.   Skin - normal coloration and turgor, no rashes, no suspicious skin lesions noted     Data   Old records and images have been reviewed    Lab Results   CBC     Lab Results   Component Value Date    WBC 9.3 07/26/2021    RBC 3.87 07/26/2021    HGB 10.6 07/26/2021    HCT 32.0 07/26/2021     07/26/2021    MCV 82.7 07/26/2021    MCH 27.4 07/26/2021    MCHC 33.1 07/26/2021    RDW 13.8 07/26/2021    NRBC 0.0 05/10/2019    SEGSPCT 58 09/29/2013    METASPCT 1.0 04/29/2020    LYMPHOPCT 39.2 07/25/2021    MONOPCT 3.8 07/25/2021    MYELOPCT 1.0 04/29/2020    BASOPCT 0.6 07/25/2021    MONOSABS 0.34 07/25/2021    LYMPHSABS 3.53 07/25/2021    EOSABS 0.04 07/25/2021    BASOSABS 0.05 07/25/2021       BMP   Lab Results   Component Value Date     07/26/2021    K 4.7 07/26/2021    K 5.9 07/25/2021     07/26/2021    CO2 17 07/26/2021    BUN 15 07/26/2021    CREATININE 0.5 07/26/2021    GLUCOSE 134 07/26/2021    CALCIUM 8.1 07/26/2021       LFTS  Lab Results   Component Value Date    ALKPHOS 135 07/25/2021    ALT 42 07/25/2021    AST 45 07/25/2021    PROT 8.3 07/25/2021    BILITOT 0.4 07/25/2021    BILIDIR <0.2 02/24/2021    IBILI see below 02/24/2021    LABALBU 4.3 07/25/2021       INR  No results for input(s): PROTIME, INR in the last 72 hours. APTT  No results for input(s): APTT in the last 72 hours. Lactic Acid  Lab Results   Component Value Date    LACTA 1.4 07/25/2021    LACTA 1.5 02/24/2021    LACTA 4.0 02/23/2021        BNP   No results for input(s): BNP in the last 72 hours. Cultures     No results for input(s): BC in the last 72 hours. No results for input(s): Ruel Akilah in the last 72 hours. No results for input(s): LABURIN in the last 72 hours. Radiology   XR CHEST PORTABLE    Result Date: 7/25/2021  EXAMINATION: ONE XRAY VIEW OF THE CHEST 7/25/2021 8:59 pm COMPARISON: Chest series from November 10, 2020 HISTORY: ORDERING SYSTEM PROVIDED HISTORY: epigastric pain TECHNOLOGIST PROVIDED HISTORY: Reason for exam:->epigastric pain FINDINGS: Adequate and symmetric aeration of the lungs. There are no formed consolidations, pleural effusions, or pneumothoraces. Trachea and central mainstem bronchi appear clear.  The cardiomediastinal silhouette and pulmonary vascularity appear within normal limits. Osseous and thoracic soft tissue structures demonstrate no acute findings. No evidence of active cardiopulmonary pathology. SYSTEMS ASSESSMENT    Neuro   No acute issues at this time, continue to monitor    Respiratory   No acute issues at this time, continue to monitor  Wean oxygen as tolerated. Keep O2 sat 90-92%    Cardiovascular   Tachycardia-likely secondary to dehydration, improved after receiving fluids, continue to monitor    Gastrointestinal   Nausea-received a one-time dose of Reglan for nausea, will give as needed antiemetics if she is unable to tolerate p.o. secondary to nausea    Renal   DKA protocol for electrolyte repletion  Hyperkalemia-improved after insulin drip     Infectious Disease   No acute issues at this time, continue to monitor given patient is in DKA, may have infectious trigger, but likely DKA secondary to poor compliance    Hematology/Oncology   Lovenox for DVT prophylaxis    Endocrine   DKA-DKA protocol, endocrinology consulted. Transition to p.o. when gap is closed, and her home long-acting insulin    Social/Spiritual/DNR/Other   Full code    ASSESSMENT/ PLAN   1. Patient had 2 subsequent closed gaps, transition to home dose Lantus  Feed patient  Appreciate recommendations from endocrinology  Okay to transfer from ICU when patient's eating and bridged    Percy Issa MD, PGY2  7:21 AM [unfilled]       Seen and examined, meds, labs, ekg and imaging reviewed. Has back pain, dka, mild hyponatremia even correcting for glucose with mild anemia after hydration. She is bridged and doing well. Await endocrine eval then to floor or home. D/W Dr. Layo Michele.

## 2021-07-26 NOTE — PROGRESS NOTES
Hospitalist contacted regarding pt pain and previous medications received in ED, New orders obtained.

## 2021-07-26 NOTE — H&P
[facility] History and Physical      CHIEF COMPLAINT: High sugar    History of Present Illness:    65-year-old woman with insulin-dependent diabetes mellitus, has not been feeling well for the past week, had a cold. In the morning today her glucose was about 300s later on in the day her glucometer read high. She felt she was in DKA and called the squad. She has lower abdominal pain and back pain. No nausea or vomiting, diarrhea, fever, chills, dysuria. In the ED here glucose was 702 on her BMP, potassium 5.9, bicarb of 15, anion gap 23 and a venous pH of 7.26. Patient has received IV fluid boluses and started on insulin drip in the ED. She is admitted to the intensive care unit. Informant(s) for H&P: Patient    REVIEW OF SYSTEMS:  A comprehensive review of systems was negative except for: what is in the HPI      PMH:  Past Medical History:   Diagnosis Date    Bleeding at insertion site 1/5/2018    Common femoral artery injury, right, initial encounter 1/5/2018    Depressive disorder     DM type 1 (diabetes mellitus, type 1) ([facility] [address]        Surgical History:  Past Surgical History:   Procedure Laterality Date    ABDOMEN SURGERY N/A 5/9/2019    INCISION AND DRAINAGE OF SUPRAPUBIC ABSESS performed by Maame Lebron MD at [address] [place] [address]      last yr       Medications Prior to Admission:    Prior to Admission medications    Medication Sig Start Date End Date Taking?  Authorizing Provider   Insulin Pen Needle (BD PEN NEEDLE SHELBIE U/F) 32G X 4 MM MISC Uses with insulin 4 times a day 3/17/21   Marcos Negrete MD   insulin glargine [facility]) 100 UNIT/ML injection pen Inject 25 Units into the skin nightly 2/24/21   Demetrius Ortiz MD   insulin lispro, 1 Unit Dial, 100 UNIT/ML SOPN Inject 3 times daily before using carb ratrio of 1U:10G  Plus sliding scale max 50 units daily 2/24/21   Demetrius Ortiz MD   NOVOLOG FLEXPEN 100 UNIT/ML injection pen PER SLIDING SCALE. MAX OF 60 UNITS DAILY 1/26/21   Historical Provider, MD   insulin lispro (HUMALOG) 100 UNIT/ML injection vial Inject 0-3 Units into the skin nightly  Patient taking differently: Inject 0-3 Units into the skin 3 times daily (before meals) Sliding scale 1/14/21   MIKE Ching - CNP       Allergies:    Patient has no known allergies. Social History:    reports that she has quit smoking. Her smoking use included cigarettes. She has never used smokeless tobacco. She reports current drug use. Drug: Marijuana. She reports that she does not drink alcohol. Family History:   family history includes Diabetes in her maternal grandmother and paternal grandmother; Stroke in an other family member.        PHYSICAL EXAM:  Vitals:  /76   Pulse 114   Temp 98.2 °F (36.8 °C) (Oral)   Resp 18   Ht 5' 1\" (1.549 m)   Wt 108 lb (49 kg)   LMP 07/17/2021   SpO2 99%   BMI 20.41 kg/m²     General Appearance: alert and oriented to person, place and time and in no acute distress  Skin: warm and dry  Head: normocephalic and atraumatic  Eyes: pupils equal, round, and reactive to light, extraocular eye movements intact, conjunctivae normal  Neck: neck supple and non tender without mass   Pulmonary/Chest: clear to auscultation bilaterally- no wheezes, rales or rhonchi, normal air movement, no respiratory distress  Cardiovascular: normal rate, normal S1 and S2 and no carotid bruits  Abdomen: soft, non-tender, non-distended, normal bowel sounds, no masses or organomegaly  Extremities: no cyanosis, no clubbing and no edema  Neurologic: no cranial nerve deficit and speech normal        LABS:  Recent Labs     07/25/21 2027 07/25/21  2302   *  --    K 5.9*  --    CL 85*  --    CO2 15*  --    BUN 24*  --    CREATININE 0.7  --    GLUCOSE 702* 492   CALCIUM 9.9  --        Recent Labs     07/25/21 2027   WBC 9.0   RBC 4.85   HGB 13.5   HCT 39.9   MCV 82.3   MCH 27.8   MCHC 33.8   RDW 13.7      MPV 10.8       No results for input(s): POCGLU in the last 72 hours. Radiology:   XR CHEST PORTABLE   Final Result   No evidence of active cardiopulmonary pathology. EKG:     ASSESSMENT:    1.  Diabetic ketoacidosis  2. Hyponatremia  3. Hyperkalemia    Active Problems:    DKA, type 1, not at goal [facility])  Resolved Problems:    * No resolved hospital problems. *      PLAN:    1.  IV fluids and insulin drip per DKA protocol    Code Status: Full code  DVT prophylaxis: SCDs      NOTE: This report was transcribed using voice recognition software. Every effort was made to ensure accuracy; however, inadvertent computerized transcription errors may be present.   Electronically signed by Lawanda Xavier MD on 7/25/2021 at 11:16 PM

## 2021-07-26 NOTE — PROGRESS NOTES
[facility] ER Progress Note    Admitting Date and Time: 7/25/2021  7:55 PM  Admit Dx: DKA, type 1, not at goal [facility]) [E10.10]    Subjective:  Patient is being followed for DKA, type 1, not at goal [facility]) [E10.10]   Pt feels ok, apparently she has been skipping her night insulin on several occasions    ROS: denies fever, chills, cp, sob, n/v, HA unless stated above.      enoxaparin  40 mg Subcutaneous Daily     dextrose 5 % and 0.45 % NaCl, , Continuous PRN  dextrose, 12.5 g, PRN  potassium chloride, 10 mEq, PRN  magnesium sulfate, 1,000 mg, PRN  sodium phosphate IVPB, 10 mmol, PRN   Or  sodium phosphate IVPB, 15 mmol, PRN   Or  sodium phosphate IVPB, 20 mmol, PRN  polyethylene glycol, 17 g, Daily PRN         Objective:    BP 96/67   Pulse 96   Temp 98.1 °F (36.7 °C) (Oral)   Resp 12   Ht 5' (1.524 m)   Wt 127 lb 10.3 oz (57.9 kg)   LMP 07/17/2021   SpO2 98%   BMI 24.93 kg/m²     General Appearance: alert and oriented to person, place and time and in no acute distress  Skin: warm and dry  Head: normocephalic and atraumatic  Eyes: pupils equal, round, and reactive to light, extraocular eye movements intact, conjunctivae normal  Neck: neck supple and non tender without mass   Pulmonary/Chest: clear to auscultation bilaterally- no wheezes, rales or rhonchi, normal air movement, no respiratory distress  Cardiovascular: normal rate, normal S1 and S2 and no carotid bruits  Abdomen: soft, non-tender, non-distended, normal bowel sounds, no masses or organomegaly  Extremities: no cyanosis, no clubbing and no edema  Neurologic: no cranial nerve deficit and speech normal        Recent Labs     07/25/21 2027 07/25/21 2027 07/25/21  2302 07/26/21  0110 07/26/21  0637   *  --   --  134 132   K 5.9*  --   --  4.0 4.7   CL 85*  --   --  103 106   CO2 15*  --   --  14* 17*   BUN 24*  --   --  21* 15   CREATININE 0.7  --   --  0.6 0.5   GLUCOSE 702*   < > 492 280* 134*   CALCIUM 9.9  --   --  8.1* 8.1* < > = values in this interval not displayed. Recent Labs     07/25/21 2027 07/26/21  0637   WBC 9.0 9.3   RBC 4.85 3.87   HGB 13.5 10.6*   HCT 39.9 32.0*   MCV 82.3 82.7   MCH 27.8 27.4   MCHC 33.8 33.1   RDW 13.7 13.8    258   MPV 10.8 10.3         Assessment:    Active Problems:    DKA, type 1, not at goal [facility])  Resolved Problems:    * No resolved hospital problems. *      Plan:  1. DKA, presented with BG of 700, bicarb at 15 and AG at 23, patient was started on insulin drip and admitted to the intensive care unit, anion gap closed will repeat BMP, consider starting patient on long-acting insulin and stop insulin drip. Patient is not fully adherent to her insulin. 2.  Diabetes type 1, patient used to be on insulin pump, apparently she was not using it(she does not like anything attached to her body), last follow-up with endocrine back in March she was supposed to be on 20 units of Basaglar at night plus Humalog 1:10 and sliding scale insulin 1:50 above 150. Last A1c we have on her was in the last year and it was 11.2, I will repeat, she was supposed to follow-up with endocrine around this time of the year, I will consult endocrine  3. Anemia, hemoglobin at 9.4 down from 13.5 most likely dilutional due to IV fluids, continue monitoring      NOTE: This report was transcribed using voice recognition software. Every effort was made to ensure accuracy; however, inadvertent computerized transcription errors may be present.   Electronically signed by Jann Davis MD on 7/26/2021 at 8:41 AM

## 2021-07-26 NOTE — CONSULTS
ENDOCRINOLOGY INITIAL CONSULTATION NOTE       Date of admission: 7/25/2021  Date of service: 7/26/2021  Admitting physician: Johnnie Scanlon MD   Primary Care Physician: Cas Tse DO  Consultant physician: Chris Brady MD     Reason for the consultation:  DM type 1 admitted with DKA     History of Present Illness:  Services were provided through a video synchronous discussion     Bhavana Dove is a 23 y.o. old female with PMH of DM type 1 admitted to [address] [place] [address] on 7/25/2021 because of abdomina pain, and malaise and found to be in DKA, endocrine team was consulted for diabetes management. In the ED was found she had glucose of 702, anion gap 23, potassium 5.9 and  started on DKA protocol    Prior to admission  Type 1 DM was diagnosed at the age 8. Prior to admission, she was basaglar 25 units daily, Humalog with meals using carb ratio 1u:10g  + ss 1:50>150. The patient was also on insulin pump I the past.she was checking checks BS 2-3 times a day and self-blood glucose monitoring has been highly variable prior to admission. She is due for annual eye exam but denied any history of diabetic retinopathy.   The patient performs her own feet care and doesn't see podiatry service  Microvascular complications:  No Retinopathy, Nephropathy + Neuropathy   Macrovascular complications: no CAD, PVD, or Stroke    Lab Results   Component Value Date    LABA1C 15.1 07/26/2021     Inpatient diet:   Carb Restricted diet     Point of care glucose monitoring (Independently reviewed)   Recent Labs     07/26/21  0638 07/26/21  0738 07/26/21  0839 07/26/21  0922 07/26/21  1044 07/26/21  1134 07/26/21  1243 07/26/21  1709   GLUMET 129* 174* 180* 192* 210* 186* 236* 266*       Past medical history:   Past Medical History:   Diagnosis Date    Bleeding at insertion site 1/5/2018    Common femoral artery injury, right, initial encounter 1/5/2018    Depressive disorder     DM type 1 (diabetes mellitus, type 1) ([facility] Past surgical history:  Past Surgical History:   Procedure Laterality Date    ABDOMEN SURGERY N/A 5/9/2019    INCISION AND DRAINAGE OF SUPRAPUBIC ABSESS performed by Demetra Villarreal MD at [facility]      last yr       Social history:   Tobacco:   reports that she has never smoked. She has never used smokeless tobacco.  Alcohol:   reports no history of alcohol use. Drugs:   reports current drug use. Drug: Marijuana. Family history:    Family History   Problem Relation Age of Onset    Diabetes Maternal Grandmother     Diabetes Paternal Grandmother     Stroke Other     Thyroid Disease Neg Hx        Allergy and drug reactions:   No Known Allergies    Scheduled Meds:   lidocaine  1 patch Transdermal Daily    insulin lispro  0-12 Units Subcutaneous TID WC    insulin lispro  0-6 Units Subcutaneous Nightly    enoxaparin  40 mg Subcutaneous Daily     PRN Meds:   glucose, 15 g, PRN  dextrose, 12.5 g, PRN  glucagon (rDNA), 1 mg, PRN  dextrose, 100 mL/hr, PRN  dextrose 5 % and 0.45 % NaCl, , Continuous PRN  dextrose, 12.5 g, PRN  potassium chloride, 10 mEq, PRN  magnesium sulfate, 1,000 mg, PRN  sodium phosphate IVPB, 10 mmol, PRN   Or  sodium phosphate IVPB, 15 mmol, PRN   Or  sodium phosphate IVPB, 20 mmol, PRN  polyethylene glycol, 17 g, Daily PRN      Continuous Infusions:   dextrose      dextrose 5 % and 0.45 % NaCl 150 mL/hr (07/26/21 0136)    insulin Stopped (07/26/21 1245)    sodium chloride 150 mL/hr at 07/26/21 0009       Review of Systems  All systems reviewed.  All negative except for symptoms mentioned in HPI     OBJECTIVE    /71   Pulse 114   Temp 98.7 °F (37.1 °C) (Oral)   Resp (!) 33   Ht 5' (1.524 m)   Wt 127 lb 10.3 oz (57.9 kg)   LMP 07/17/2021   SpO2 99%   BMI 24.93 kg/m²     Intake/Output Summary (Last 24 hours) at 7/26/2021 1805  Last data filed at 7/26/2021 1700  Gross per 24 hour   Intake 1942.5 ml   Output --   Net 1942.5 ml Physical examination:  General: awake alert, oriented x3, no abnormal position or movements. HEENT: normocephalic non traumatic, no exophthalmos   Neck: supple, no LN enlargement, no thyromegaly, no thyroid tenderness, no JVD. Pulm: Clear equal air entry no added sounds, no wheezing or rhonchi    CVS: S1 + S2, no murmur, no heave. Dorsalis pedis pulse palpable   Abd: soft lax, mild tenderness, no organomegaly, audible bowel sounds. Skin: warm, no lesions, no rash. no Ulcers, no open wounds   Neuro: CN intact, muscle power normal  Psych: normal mood, and affect    Review of Laboratory Data:  I personally reviewed the following labs:   Recent Labs     07/25/21 2027 07/26/21  0637   WBC 9.0 9.3   RBC 4.85 3.87   HGB 13.5 10.6*   HCT 39.9 32.0*   MCV 82.3 82.7   MCH 27.8 27.4   MCHC 33.8 33.1   RDW 13.7 13.8    258   MPV 10.8 10.3     Recent Labs     07/25/21 2027 07/25/21  2302 07/26/21  0110 07/26/21  0637 07/26/21  0922   *   < > 134 132 131*   K 5.9*  --  4.0 4.7 4.6   CL 85*   < > 103 106 105   CO2 15*   < > 14* 17* 16*   BUN 24*   < > 21* 15 13   CREATININE 0.7   < > 0.6 0.5 0.5   GLUCOSE 702*   < > 280* 134* 196*   CALCIUM 9.9   < > 8.1* 8.1* 7.9*   PROT 8.3  --   --   --   --    LABALBU 4.3  --   --   --   --    BILITOT 0.4  --   --   --   --    ALKPHOS 135*  --   --   --   --    AST 45*  --   --   --   --    ALT 42*  --   --   --   --     < > = values in this interval not displayed.      Beta-Hydroxybutyrate   Date Value Ref Range Status   07/25/2021 >4.50 (H) 0.02 - 0.27 mmol/L Final   02/23/2021 >4.50 (H) 0.02 - 0.27 mmol/L Final   01/11/2021 >4.50 (H) 0.02 - 0.27 mmol/L Final     Lab Results   Component Value Date    LABA1C 15.1 07/26/2021    LABA1C 11.2 10/18/2020    LABA1C 13.0 05/30/2020     No results found for: TSH, T4FREE, X3VCTXQ, FT3, Y9LAUKA, TSI, TPOABS, THGAB  Lab Results   Component Value Date    LABA1C 15.1 07/26/2021    GLUCOSE 196 07/26/2021    MALBCR 204.6 01/23/2018 LABMICR 102.3 01/23/2018    LABCREA 12 10/18/2020     Lab Results   Component Value Date    TRIG 106 01/24/2018    HDL 40 01/24/2018    LDLCALC 114 01/24/2018    CHOL 175 01/24/2018       Blood culture   Lab Results   Component Value Date    BC 5 Days no growth 02/24/2021    BC 5 Days no growth 11/10/2020       Radiology:  XR CHEST PORTABLE   Final Result   No evidence of active cardiopulmonary pathology. Medical Records/Labs/Images review:   I personally reviewed and summarized previous records   All labs and imaging were reviewed independently     Bob Espinoza, a 23 y.o.-old female seen today for inpatient diabetes management     Brittle type 1 DM admitted with DKA   · Patient's DM is very brittle and poorly controlled  due to poor compliance with insulin, diet and BS checking  · We recommend transition to the following DM regimen   · Change Lantus 25 units daily   · Humalog 8 units with meals   · Medium dose sliding scale     · Glucose check before meals and at bed time   · Will titrate insulin dose based on blood glucose trend & insulin requirement  · On discharge we recommend Basaglar 25 units daily, Humalog 1u:10g of carbs  with meals + medium ss  · Pt will follow with us after discharge, Endocrine follow up visit, Monday 8/9 at 3:00PM     Abdominal pain   · Previous work up showed normal Gastric empty study and HIDA scan     The above issues were reviewed with the patient who understood and agreed with the plan. Thank you for allowing us to participate in the care of this patient. Please do not hesitate to contact us with any additional questions. Brennon Alford MD  Endocrinologist, [facility]   [address] [place] [id number]   Phone: [phone]  Fax: [phone]  --------------------------------------------  An electronic signature was used to authenticate this note.  Priyanka Nascimento MD on 7/26/2021 at 6:05 PM

## 2021-07-26 NOTE — PATIENT CARE CONFERENCE
Intensive Care Daily Quality Rounding Checklist      ICU Team Members: Dr. Amandeep Jaimes, Dr. Edilia Guallpa (resident), clinical pharmacist, charge nurse, bedside nurse, respiratory therapist    ICU Day #: 2    Intubation Date: N/A    Ventilator Day #: N/A    Central Line Insertion Date: N/A        Day #: N/A     Arterial Line Insertion Date: N/A      Day #: N/A    Temporary Hemodialysis Catheter Insertion Date: N/A      Day # N/A    DVT Prophylaxis: Lovenox    GI Prophylaxis: will start PO diet    Bey Catheter Insertion Date: N/A       Day #: N/A      Continued need (if yes, reason documented and discussed with physician): N/A    Skin Issues/ Wounds and ordered treatment discussed on rounds: None     Goals/ Plans for the Day: Daily labs, replace electrolytes as needed, stop insulin gtt; will bridge to Subq insulin and start PO diet

## 2021-07-26 NOTE — PLAN OF CARE
Problem: Pain:  Goal: Pain level will decrease  Description: Pain level will decrease  Outcome: Not Met This Shift  Goal: Control of acute pain  Description: Control of acute pain  Outcome: Not Met This Shift  Goal: Control of chronic pain  Description: Control of chronic pain  Outcome: Not Met This Shift     Problem: Infection:  Goal: Will remain free from infection  Description: Will remain free from infection  Outcome: Met This Shift     Problem: Safety:  Goal: Free from accidental physical injury  Description: Free from accidental physical injury  Outcome: Met This Shift  Goal: Free from intentional harm  Description: Free from intentional harm  Outcome: Met This Shift     Problem: Daily Care:  Goal: Daily care needs are met  Description: Daily care needs are met  Outcome: Met This Shift     Problem: Discharge Planning:  Goal: Patients continuum of care needs are met  Description: Patients continuum of care needs are met  Outcome: Not Met This Shift

## 2021-07-26 NOTE — ED PROVIDER NOTES
[facility]  Department of Emergency Medicine     Written by: Sina Stokes DO  Patient Name: Yamile Munoz Date: 2021  7:55 PM  MRN: [id number]                   : 1995        Chief Complaint   Patient presents with    Hyperglycemia     0 ems    Back Pain     lower    - Chief complaint    Patient is a 82-year-old female past medical history of diabetes type 1 and depression. Patient presents with chief complaint of nausea, vomiting, elevated blood sugars and low back pain. Patient states symptoms began yesterday. She describes the back pain as a dull aching sensation. She currently rates pain 8 out of 10. Patient also notes multiple episodes of nonbilious nonbloody vomiting as well as elevated blood sugars. Patient states that she has been checking her sugars and they have been in the upper 500s. Patient notes that she has been intermittently compliant with her evening insulin dose and has missed a couple of the doses this past week. Patient denies any exacerbating or relieving factors. She states that symptoms have been constant since onset. Patient notes that she has had multiple episodes of DKA in the past and feels that the symptoms are similar to that episode. Patient denies any fevers, chills, chest pain, cough, constipation, diarrhea, dizziness, lightheadedness, numbness or tingling. The history is provided by the patient. No  was used. Review of Systems   Constitutional: Negative for chills and fever. HENT: Negative for ear pain, sinus pressure and sore throat. Eyes: Negative for pain, discharge and redness. Respiratory: Negative for cough, shortness of breath and wheezing. Cardiovascular: Negative for chest pain. Gastrointestinal: Positive for nausea and vomiting. Negative for abdominal distention and diarrhea.    Endocrine:        Elevated blood sugars   Genitourinary: Negative for dysuria and frequency. Musculoskeletal: Positive for back pain. Negative for arthralgias. Skin: Negative for rash and wound. Neurological: Negative for weakness and headaches. Hematological: Negative for adenopathy. All other systems reviewed and are negative. Physical Exam  Vitals and nursing note reviewed. Constitutional:       General: She is not in acute distress. Appearance: Normal appearance. HENT:      Head: Normocephalic and atraumatic. Nose: No congestion or rhinorrhea. Mouth/Throat:      Mouth: Mucous membranes are moist.      Pharynx: Oropharynx is clear. Eyes:      Extraocular Movements: Extraocular movements intact. Pupils: Pupils are equal, round, and reactive to light. Cardiovascular:      Rate and Rhythm: Regular rhythm. Tachycardia present. Heart sounds: No murmur heard. No gallop. Pulmonary:      Effort: Pulmonary effort is normal. No respiratory distress. Breath sounds: No wheezing, rhonchi or rales. Abdominal:      General: Abdomen is flat. Palpations: Abdomen is soft. There is no mass. Tenderness: There is abdominal tenderness. There is no guarding. Hernia: No hernia is present. Musculoskeletal:         General: No swelling, tenderness or signs of injury. Normal range of motion. Cervical back: Normal range of motion. No rigidity. No muscular tenderness. Skin:     General: Skin is warm and dry. Capillary Refill: Capillary refill takes less than 2 seconds. Neurological:      General: No focal deficit present. Mental Status: She is alert and oriented to person, place, and time. Mental status is at baseline. Comments: Bilateral lower extremities neurovascular intact, strength 5 out of 5 to bilateral lower extremities, patient denies any red alarm symptoms, no history of fevers, no IV drug use.    Psychiatric:         Mood and Affect: Mood normal.         Behavior: Behavior normal.          Procedures       MDM --------------------------------------------- PAST HISTORY ---------------------------------------------  Past Medical History:  has a past medical history of Bleeding at insertion site, Common femoral artery injury, right, initial encounter, Depressive disorder, and DM type 1 ([facility], [facility] 1[facility] Past Surgical History:  has a past surgical history that includes Abdomen surgery (N/A, 5/9/2019) and Bartholin gland cyst excision. Social History:  reports that she has never smoked. She has never used smokeless tobacco. She reports current drug use. Drug: Marijuana. She reports that she does not drink alcohol. Family History: family history includes Diabetes in her maternal grandmother and paternal grandmother; Stroke in an other family member. The patients home medications have been reviewed. Allergies: Patient has no known allergies.     -------------------------------------------------- RESULTS -------------------------------------------------    Lab  Results for orders placed or performed during the hospital encounter of 07/25/21   CBC Auto Differential   Result Value Ref Range    WBC 9.0 4.5 - 11.5 E9/L    RBC 4.85 3.50 - 5.50 E12/L    Hemoglobin 13.5 11.5 - 15.5 g/dL    Hematocrit 39.9 34.0 - 48.0 %    MCV 82.3 80.0 - 99.9 fL    MCH 27.8 26.0 - 35.0 pg    MCHC 33.8 32.0 - 34.5 %    RDW 13.7 11.5 - 15.0 fL    Platelets 585 107 - 276 E9/L    MPV 10.8 7.0 - 12.0 fL    Neutrophils % 55.7 43.0 - 80.0 %    Immature Granulocytes % 0.3 0.0 - 5.0 %    Lymphocytes % 39.2 20.0 - 42.0 %    Monocytes % 3.8 2.0 - 12.0 %    Eosinophils % 0.4 0.0 - 6.0 %    Basophils % 0.6 0.0 - 2.0 %    Neutrophils Absolute 5.01 1.80 - 7.30 E9/L    Immature Granulocytes # 0.03 E9/L    Lymphocytes Absolute 3.53 1.50 - 4.00 E9/L    Monocytes Absolute 0.34 0.10 - 0.95 E9/L    Eosinophils Absolute 0.04 (L) 0.05 - 0.50 E9/L    Basophils Absolute 0.05 0.00 - 0.20 E9/L   Comprehensive Metabolic Panel w/ Reflex to MG   Result Value Ref Range    Sodium 123 (L) 132 - 146 mmol/L    Potassium reflex Magnesium 5.9 (H) 3.5 - 5.0 mmol/L    Chloride 85 (L) 98 - 107 mmol/L    CO2 15 (L) 22 - 29 mmol/L    Anion Gap 23 (H) 7 - 16 mmol/L    Glucose 702 (HH) 74 - 99 mg/dL    BUN 24 (H) 6 - 20 mg/dL    CREATININE 0.7 0.5 - 1.0 mg/dL    GFR Non-African American >60 >=60 mL/min/1.73    GFR African American >60     Calcium 9.9 8.6 - 10.2 mg/dL    Total Protein 8.3 6.4 - 8.3 g/dL    Albumin 4.3 3.5 - 5.2 g/dL    Total Bilirubin 0.4 0.0 - 1.2 mg/dL    Alkaline Phosphatase 135 (H) 35 - 104 U/L    ALT 42 (H) 0 - 32 U/L    AST 45 (H) 0 - 31 U/L   Lipase   Result Value Ref Range    Lipase 27 13 - 60 U/L   Urinalysis, reflex to microscopic   Result Value Ref Range    Color, UA Straw Straw/Yellow    Clarity, UA Clear Clear    Glucose, Ur >=1000 (A) Negative mg/dL    Bilirubin Urine Negative Negative    Ketones, Urine >=80 (A) Negative mg/dL    Specific Gravity, UA 1.015 1.005 - 1.030    Blood, Urine Negative Negative    pH, UA 6.0 5.0 - 9.0    Protein, UA Negative Negative mg/dL    Urobilinogen, Urine 0.2 <2.0 E.U./dL    Nitrite, Urine Negative Negative    Leukocyte Esterase, Urine TRACE (A) Negative   Lactic Acid, Plasma   Result Value Ref Range    Lactic Acid 1.4 0.5 - 2.2 mmol/L   PH, VENOUS   Result Value Ref Range    pH, Christoph 7.26 (L) 7.35 - 7.45   Beta-Hydroxybutyrate   Result Value Ref Range    Beta-Hydroxybutyrate >4.50 (H) 0.02 - 0.27 mmol/L   Microscopic Urinalysis   Result Value Ref Range    WBC, UA 1-3 0 - 5 /HPF    RBC, UA 0-1 0 - 2 /HPF    Epithelial Cells, UA FEW /HPF    Bacteria, UA NONE SEEN None Seen /HPF   Basic Metabolic Panel   Result Value Ref Range    Sodium 134 132 - 146 mmol/L    Potassium 4.0 3.5 - 5.0 mmol/L    Chloride 103 98 - 107 mmol/L    CO2 14 (L) 22 - 29 mmol/L    Anion Gap 17 (H) 7 - 16 mmol/L    Glucose 280 (H) 74 - 99 mg/dL    BUN 21 (H) 6 - 20 mg/dL    CREATININE 0.6 0.5 - 1.0 mg/dL    GFR Non-African American >60 >=60 mL/min/1.73    GFR African American >60     Calcium 8.1 (L) 8.6 - 10.2 mg/dL   Basic Metabolic Panel   Result Value Ref Range    Sodium 132 132 - 146 mmol/L    Potassium 4.7 3.5 - 5.0 mmol/L    Chloride 106 98 - 107 mmol/L    CO2 17 (L) 22 - 29 mmol/L    Anion Gap 9 7 - 16 mmol/L    Glucose 134 (H) 74 - 99 mg/dL    BUN 15 6 - 20 mg/dL    CREATININE 0.5 0.5 - 1.0 mg/dL    GFR Non-African American >60 >=60 mL/min/1.73    GFR African American >60     Calcium 8.1 (L) 8.6 - 10.2 mg/dL   Basic Metabolic Panel   Result Value Ref Range    Sodium 131 (L) 132 - 146 mmol/L    Potassium 4.6 3.5 - 5.0 mmol/L    Chloride 105 98 - 107 mmol/L    CO2 16 (L) 22 - 29 mmol/L    Anion Gap 10 7 - 16 mmol/L    Glucose 196 (H) 74 - 99 mg/dL    BUN 13 6 - 20 mg/dL    CREATININE 0.5 0.5 - 1.0 mg/dL    GFR Non-African American >60 >=60 mL/min/1.73    GFR African American >60     Calcium 7.9 (L) 8.6 - 10.2 mg/dL   Magnesium   Result Value Ref Range    Magnesium 1.6 1.6 - 2.6 mg/dL   Magnesium   Result Value Ref Range    Magnesium 2.5 1.6 - 2.6 mg/dL   Magnesium   Result Value Ref Range    Magnesium 2.2 1.6 - 2.6 mg/dL   Phosphorus   Result Value Ref Range    Phosphorus 2.7 2.5 - 4.5 mg/dL   Phosphorus   Result Value Ref Range    Phosphorus 4.0 2.5 - 4.5 mg/dL   Phosphorus   Result Value Ref Range    Phosphorus 3.4 2.5 - 4.5 mg/dL   CBC   Result Value Ref Range    WBC 9.3 4.5 - 11.5 E9/L    RBC 3.87 3.50 - 5.50 E12/L    Hemoglobin 10.6 (L) 11.5 - 15.5 g/dL    Hematocrit 32.0 (L) 34.0 - 48.0 %    MCV 82.7 80.0 - 99.9 fL    MCH 27.4 26.0 - 35.0 pg    MCHC 33.1 32.0 - 34.5 %    RDW 13.8 11.5 - 15.0 fL    Platelets 006 632 - 604 E9/L    MPV 10.3 7.0 - 12.0 fL   HEMOGLOBIN A1C   Result Value Ref Range    Hemoglobin A1C 15.1 (H) 4.0 - 5.6 %   POCT Glucose   Result Value Ref Range    Meter Glucose >500 (H) 74 - 99 mg/dL   POCT glucose   Result Value Ref Range    Glucose  mg/dL    QC OK?  YES    POCT Glucose - every hour   Result Value Ref Range Glucose 492 mg/dL    QC OK? ok    POCT Glucose   Result Value Ref Range    Meter Glucose >500 (H) 74 - 99 mg/dL   POC Pregnancy Urine   Result Value Ref Range    HCG, Urine, POC Negative Negative    Lot Number [id number]     Positive QC Pass/Fail Pass     Negative QC Pass/Fail Pass    POCT Glucose   Result Value Ref Range    Meter Glucose 492 (H) 74 - 99 mg/dL   POCT Glucose   Result Value Ref Range    Meter Glucose 331 (H) 74 - 99 mg/dL   POCT Glucose   Result Value Ref Range    Meter Glucose 231 (H) 74 - 99 mg/dL   POCT Glucose   Result Value Ref Range    Meter Glucose 187 (H) 74 - 99 mg/dL   POCT Glucose   Result Value Ref Range    Meter Glucose 158 (H) 74 - 99 mg/dL   POCT Glucose   Result Value Ref Range    Meter Glucose 168 (H) 74 - 99 mg/dL   POCT Glucose   Result Value Ref Range    Meter Glucose 147 (H) 74 - 99 mg/dL   POCT Glucose   Result Value Ref Range    Meter Glucose 122 (H) 74 - 99 mg/dL   POCT Glucose   Result Value Ref Range    Meter Glucose 129 (H) 74 - 99 mg/dL   POCT Glucose   Result Value Ref Range    Meter Glucose 174 (H) 74 - 99 mg/dL   POCT Glucose   Result Value Ref Range    Meter Glucose 180 (H) 74 - 99 mg/dL   POCT Glucose   Result Value Ref Range    Meter Glucose 192 (H) 74 - 99 mg/dL   POCT Glucose   Result Value Ref Range    Meter Glucose 210 (H) 74 - 99 mg/dL   POCT Glucose   Result Value Ref Range    Meter Glucose 186 (H) 74 - 99 mg/dL   POCT Glucose   Result Value Ref Range    Meter Glucose 236 (H) 74 - 99 mg/dL   EKG 12 Lead   Result Value Ref Range    Ventricular Rate 116 BPM    Atrial Rate 116 BPM    P-R Interval 160 ms    QRS Duration 62 ms    Q-T Interval 332 ms    QTc Calculation (Bazett) 461 ms    P Axis 75 degrees    R Axis 62 degrees    T Axis 62 degrees       Radiology  XR CHEST PORTABLE   Final Result   No evidence of active cardiopulmonary pathology. EKG:  This EKG is signed and interpreted by me.     Rate: 114  Rhythm: Sinus  Interpretation: EKG reviewed demonstrated sinus tachycardia, rate 116, normal axis, , no acute ST segment changes. Comparison: stable as compared to patient's most recent EKG      ------------------------- NURSING NOTES AND VITALS REVIEWED ---------------------------  Date / Time Roomed:  7/25/2021  7:55 PM  ED Bed Assignment:  [id number]    The nursing notes within the ED encounter and vital signs as below have been reviewed.    Patient Vitals for the past 24 hrs:   BP Temp Temp src Pulse Resp SpO2 Height Weight   07/26/21 1300 109/71 -- -- 106 29 100 % -- --   07/26/21 1200 109/71 98.2 °F (36.8 °C) Oral 99 16 99 % -- --   07/26/21 1100 118/85 -- -- 95 14 100 % -- --   07/26/21 1000 118/85 -- -- 96 12 100 % -- --   07/26/21 0900 116/85 -- -- 97 15 100 % -- --   07/26/21 0800 107/70 98.3 °F (36.8 °C) Oral 95 11 99 % -- --   07/26/21 0700 96/67 -- -- 96 12 -- -- --   07/26/21 0600 103/69 98.1 °F (36.7 °C) Oral 92 11 98 % -- --   07/26/21 0500 107/68 -- -- 99 12 99 % -- --   07/26/21 0400 (!) 107/49 98 °F (36.7 °C) Oral 100 13 98 % -- --   07/26/21 0300 (!) 100/59 -- -- 111 15 100 % -- --   07/26/21 0200 101/62 97.8 °F (36.6 °C) Oral 107 14 99 % -- --   07/26/21 0100 98/66 -- -- 111 14 100 % -- --   07/26/21 0000 98/66 97.8 °F (36.6 °C) Oral 107 15 100 % -- --   07/25/21 2330 -- 97.8 °F (36.6 °C) Oral -- -- -- 5' (1.524 m) 127 lb 10.3 oz (57.9 kg)   07/25/21 2250 131/76 -- -- 114 18 99 % -- --   07/25/21 2235 -- -- -- 112 -- 100 % -- --   07/25/21 2155 (!) 103/53 -- -- 114 18 100 % -- --   07/25/21 2108 116/67 -- -- 115 -- 100 % -- --   07/25/21 2049 114/61 -- -- 114 18 100 % -- --   07/25/21 1959 111/75 98.2 °F (36.8 °C) Oral 112 18 100 % 5' 1\" (1.549 m) 108 lb (49 kg)       Oxygen Saturation Interpretation: Normal      ------------------------------------------ PROGRESS NOTES ------------------------------------------  Re-evaluation(s):  Time: 2145  Patients symptoms show no change  Repeat physical examination is not changed    Time: 3130.  Patients symptoms are improving  Repeat physical examination is improved    I have spoken with the patient and discussed todays results, in addition to providing specific details for the plan of care and counseling regarding the diagnosis and prognosis. Their questions are answered at this time and they are agreeable with the plan.      --------------------------------- ADDITIONAL PROVIDER NOTES ---------------------------------  Consultations:  Spoke with hospitalist,  They will admit this patient. This patient's ED course included: a personal history and physicial examination, re-evaluation prior to disposition, IV medications and cardiac monitoring    This patient has remained hemodynamically stable during their ED course. Please note that the withdrawal or failure to initiate urgent interventions for this patient would likely result in a life threatening deterioration or permanent disability. Clinical Impression  1. Type 1 diabetes mellitus with ketoacidosis without coma ([facility]    2. Acute midline low back pain without sciatica    3. Non-intractable vomiting with nausea, unspecified vomiting type          Disposition  Patient's disposition: Admit to CCU/ICU  Patient's condition is stable. Patient was seen and evaluated by myself and my attending. Assessment and Plan discussed with attending provider, please see attestation for final plan of care.      DO Dariela Hernandez DO  Resident  07/26/21 1400

## 2021-07-26 NOTE — DISCHARGE SUMMARY
[facility] Physician Discharge Summary       [address] MD  [address] [facility] [address]  [phone]    On 8/9/2021  Endocrine follow up visit, Monday 8/9 at 3:00PM      Activity level: As tolerated     Dispo: home      Condition on discharge: Stable     Patient ID:  Gab Banegas  [id number]  7 y.o.  1995    Admit date: 7/25/2021    Discharge date and time:  7/26/2021  6:15 PM    Admission Diagnoses: Active Problems:    DKA, type 1, not at goal [facility])  Resolved Problems:    * No resolved hospital problems. *      Discharge Diagnoses: Active Problems:    DKA, type 1, not at goal [facility])  Resolved Problems:    * No resolved hospital problems. *      Consults:  IP CONSULT TO CRITICAL CARE  IP CONSULT TO CRITICAL CARE  IP CONSULT TO ENDOCRINOLOGY      Hospital Course:   Patient Gab Banegas is a 23 y.o. presented with DKA, type 1, not at goal ([address] [E10.10]    1. DKA, presented with BG of 700, bicarb at 15 and AG at 23, patient was started on insulin drip and admitted to the intensive care unit, anion gap closed, we stopped insulin drip. Patient is not fully adherent to her insulin. resolved  2. Diabetes type 1, patient used to be on insulin pump, apparently she was not using it(she does not like anything attached to her body), last follow-up with endo back in March she was supposed to be on 20 units of Basaglar at night plus Humalog 1:10 and sliding scale insulin 1:50 above 150. Last A1c we have on her was in the last year and it was 11.2, repeated at 15, I appreciate endo input, we will discharge the patient on insulin glargine 25 units daily with 1u:10gm of carbs with meals and SSI  3.   Anemia, hemoglobin at 9.4 down from 13.5 most likely dilutional due to IV fluids, continue monitoring    Discharge Exam:    General Appearance: alert and oriented to person, place and time and in no acute distress  Skin: warm and dry  Head: normocephalic and atraumatic  Eyes: pupils equal, round, and reactive to light, extraocular eye movements intact, conjunctivae normal  Neck: neck supple and non tender without mass   Pulmonary/Chest: clear to auscultation bilaterally- no wheezes, rales or rhonchi, normal air movement, no respiratory distress  Cardiovascular: normal rate, normal S1 and S2 and no carotid bruits  Abdomen: soft, non-tender, non-distended, normal bowel sounds, no masses or organomegaly  Extremities: no cyanosis, no clubbing and no edema  Neurologic: no cranial nerve deficit and speech normal    I/O last 3 completed shifts: In: 1482.5 [P.O.:320; I.V.:1162.5]  Out: -   I/O this shift: In: 26 [P.O.:460]  Out: -       LABS:  Recent Labs     07/26/21  0110 07/26/21  0637 07/26/21  0922    132 131*   K 4.0 4.7 4.6    106 105   CO2 14* 17* 16*   BUN 21* 15 13   CREATININE 0.6 0.5 0.5   GLUCOSE 280* 134* 196*   CALCIUM 8.1* 8.1* 7.9*       Recent Labs     07/25/21 2027 07/26/21  0637   WBC 9.0 9.3   RBC 4.85 3.87   HGB 13.5 10.6*   HCT 39.9 32.0*   MCV 82.3 82.7   MCH 27.8 27.4   MCHC 33.8 33.1   RDW 13.7 13.8    258   MPV 10.8 10.3       No results for input(s): POCGLU in the last 72 hours. Imaging:  XR CHEST PORTABLE    Result Date: 7/25/2021  EXAMINATION: ONE XRAY VIEW OF THE CHEST 7/25/2021 8:59 pm COMPARISON: Chest series from November 10, 2020 HISTORY: ORDERING SYSTEM PROVIDED HISTORY: epigastric pain TECHNOLOGIST PROVIDED HISTORY: Reason for exam:->epigastric pain FINDINGS: Adequate and symmetric aeration of the lungs. There are no formed consolidations, pleural effusions, or pneumothoraces. Trachea and central mainstem bronchi appear clear. The cardiomediastinal silhouette and pulmonary vascularity appear within normal limits. Osseous and thoracic soft tissue structures demonstrate no acute findings. No evidence of active cardiopulmonary pathology.        Patient Instructions:      Medication List      CHANGE how you take these medications    *

## 2021-07-26 NOTE — PLAN OF CARE
Problem: Pain:  Goal: Pain level will decrease  Description: Pain level will decrease  7/26/2021 1356 by Marli Gonzalez RN  Outcome: Met This Shift  7/26/2021 0039 by Margot Whittington RN  Outcome: Not Met This Shift  Goal: Control of acute pain  Description: Control of acute pain  7/26/2021 1356 by Marli Gonzalez RN  Outcome: Met This Shift  7/26/2021 0039 by Margot Whittington RN  Outcome: Not Met This Shift  Goal: Control of chronic pain  Description: Control of chronic pain  7/26/2021 1356 by Marli Gonzalez RN  Outcome: Met This Shift  7/26/2021 0039 by Margot Whittington RN  Outcome: Not Met This Shift     Problem: Infection:  Goal: Will remain free from infection  Description: Will remain free from infection  7/26/2021 1356 by Marli Gonzalez RN  Outcome: Met This Shift  7/26/2021 0039 by Margot Whittington RN  Outcome: Met This Shift     Problem: Safety:  Goal: Free from accidental physical injury  Description: Free from accidental physical injury  7/26/2021 1356 by Marli Gonzalez RN  Outcome: Met This Shift  7/26/2021 0039 by Margot Whittington RN  Outcome: Met This Shift  Goal: Free from intentional harm  Description: Free from intentional harm  7/26/2021 1356 by Marli Gonzalez RN  Outcome: Met This Shift  7/26/2021 0039 by Margot Whittington RN  Outcome: Met This Shift     Problem: Daily Care:  Goal: Daily care needs are met  Description: Daily care needs are met  7/26/2021 1356 by Marli Gonzalez RN  Outcome: Met This Shift  7/26/2021 0039 by Margot Whittington RN  Outcome: Met This Shift     Problem: Discharge Planning:  Goal: Patients continuum of care needs are met  Description: Patients continuum of care needs are met  7/26/2021 1356 by Marli Gonzalez RN  Outcome: Met This Shift  7/26/2021 0039 by Margot Whittington RN  Outcome: Not Met This Shift

## 2021-07-26 NOTE — CARE COORDINATION
Social Work:    Spoke with Makayla Angeles, explained social work role, and discussed discharge planning. Makayla Angeles resides independently with her grandmother. Her PCP is Dr. Jared Noel and she uses [facility] [address]. Upon discharge from [facility] today, Makayla Angeles denies the need for [address] and only requested her long-active insulin. Social work updated RN.     Electronically signed by PERNELL Heath on 7/26/2021 at 4:01 PM

## 2021-07-27 ENCOUNTER — TELEPHONE (OUTPATIENT)
Dept: PRIMARY CARE CLINIC | Age: 26
End: 2021-07-27

## 2021-07-27 LAB — MRSA CULTURE ONLY: NORMAL

## 2021-07-27 NOTE — TELEPHONE ENCOUNTER
[facility] Trans[facility] Initial Follow Up Call    Outreach made within 2 business days of discharge: Yes    Patient: Jolie See Patient : 1995   MRN: [id number]  Reason for Admission: There are no discharge diagnoses documented for the most recent discharge. Discharge Date: 21       Spoke with: Amelia Cordero    Discharge department/facility: [facility] Patient Contact:  Was patient able to fill all prescriptions: Yes  Was patient instructed to bring all medications to the follow-up visit: Yes  Is patient taking all medications as directed in the discharge summary?  Yes  Does patient understand their discharge instructions: Yes  Does patient have questions or concerns that need addressed prior to 7-14 day follow up office visit: no    Scheduled appointment with PCP within 7-14 days    Follow Up  Future Appointments   Date Time Provider Joleen Silver   2021  3:00 PM [address] [place] [address]

## 2021-08-03 ENCOUNTER — TELEPHONE (OUTPATIENT)
Dept: ENDOCRINOLOGY | Age: 26
End: 2021-08-03

## 2021-08-03 NOTE — TELEPHONE ENCOUNTER
Patient is faxing over forms from the [facility] for  to fill out for her to get her permit. Can we fax forms back to [facility] in [place] once filled out?

## 2021-08-05 ENCOUNTER — VIRTUAL VISIT (OUTPATIENT)
Dept: PRIMARY CARE CLINIC | Age: 26
End: 2021-08-05
Payer: COMMERCIAL

## 2021-08-05 DIAGNOSIS — E10.65 UNCONTROLLED TYPE 1 DIABETES MELLITUS WITH HYPERGLYCEMIA (HCC): Primary | Chronic | ICD-10-CM

## 2021-08-05 DIAGNOSIS — D64.9 ANEMIA, UNSPECIFIED TYPE: ICD-10-CM

## 2021-08-05 PROCEDURE — 1111F DSCHRG MED/CURRENT MED MERGE: CPT | Performed by: INTERNAL MEDICINE

## 2021-08-05 PROCEDURE — 99214 OFFICE O/P EST MOD 30 MIN: CPT | Performed by: INTERNAL MEDICINE

## 2021-08-05 RX ORDER — INSULIN GLARGINE 100 [IU]/ML
25 INJECTION, SOLUTION SUBCUTANEOUS NIGHTLY
Qty: 10 PEN | Refills: 3 | Status: SHIPPED
Start: 2021-08-05 | End: 2021-08-24

## 2021-08-05 ASSESSMENT — ENCOUNTER SYMPTOMS
CHEST TIGHTNESS: 0
CHOKING: 0
DIARRHEA: 0
BACK PAIN: 0
VOMITING: 0
CONSTIPATION: 0
ABDOMINAL DISTENTION: 0
SHORTNESS OF BREATH: 0
WHEEZING: 0
NAUSEA: 0

## 2021-08-05 NOTE — PROGRESS NOTES
Post-Discharge Transitional Care Management Services or Hospital Follow Up      Suzan Stewart   YOB: 1995    Date of Office Visit:  8/5/2021  Date of Hospital Admission: 7/25/21  Date of Hospital Discharge: 7/26/21  Readmission Risk Score(high >=14%.  Medium >=10%):Readmission Risk Score: 22      Care management risk score Rising risk (score 2-5) and Complex Care (Scores >=6): 5     Non face to face  following discharge, date last encounter closed (first attempt may have been earlier): 7/27/2021  2:02 PM 7/27/2021  2:02 PM    Call initiated 2 business days of discharge: Yes     Patient Active Problem List   Diagnosis    [facility]    Diabetes mellitus type 1, uncontrolled ([facility]    Personal history of noncompliance with medical treatment, presenting hazards to health    Leukocytosis    Elevated lactic acid level    Hyperglycemia    Pyelonephritis    Acidosis    Severe dehydration    Hypokalemia    Diabetic ketoacidosis without coma associated with type 1 diabetes mellitus ([facility]    Hypoglycemia    Vitamin D deficiency    Uncontrolled type 1 diabetes mellitus with hyperglycemia (HCC)    Severe protein-calorie malnutrition (HCC)    Trichomoniasis    Lactic acid acidosis    DKA, type 1, not at goal [facility])    COVID-19 virus infection    Depressive disorder    Generalized abdominal pain    Nausea, vomiting and diarrhea       No Known Allergies    Medications listed as ordered at the time of discharge from hospital   [facility] [place] [facility] Medication Instructions ARELI:    Printed on:08/05/21 0874   Medication Information                      insulin glargine (BASAGLAR KWIKPEN) 100 UNIT/ML injection pen  Inject 25 Units into the skin nightly             insulin lispro, 1 Unit Dial, 100 UNIT/ML SOPN  Inject 3 times daily before using carb ratrio of 1U:10G  Plus sliding scale max 50 units daily             Insulin Pen Needle (BD PEN NEEDLE SHELBIE U/F) 32G X 4 MM MISC  Uses with insulin 4 times a day             NOVOLOG FLEXPEN 100 UNIT/ML injection pen  PER SLIDING SCALE. MAX OF 60 UNITS DAILY                   Medications marked \"taking\" at this time  Outpatient Medications Marked as Taking for the 8/5/21 encounter (Virtual Visit) with Cliff Barlow DO   Medication Sig Dispense Refill    insulin lispro, 1 Unit Dial, 100 UNIT/ML SOPN Inject 3 times daily before using carb ratrio of 1U:10G  Plus sliding scale max 50 units daily 4 pen 2    insulin glargine (BASAGLAR KWIKPEN) 100 UNIT/ML injection pen Inject 25 Units into the skin nightly 10 pen 3    Insulin Pen Needle (BD PEN NEEDLE SHELBIE U/F) 32G X 4 MM MISC Uses with insulin 4 times a day 300 each 5        Medications patient taking as of now reconciled against medications ordered at time of hospital discharge: Yes    Chief Complaint   Patient presents with    Follow-Up from Hospital     diabetes       HPI    Inpatient course: Discharge summary reviewed- see chart. Review of Systems   Constitutional: Negative for activity change, appetite change, chills, fatigue and fever. Respiratory: Negative for choking, chest tightness, shortness of breath and wheezing. Cardiovascular: Negative for chest pain, palpitations and leg swelling. Gastrointestinal: Negative for abdominal distention, constipation, diarrhea, nausea and vomiting. Musculoskeletal: Negative for arthralgias, back pain, gait problem and joint swelling. Neurological: Negative for dizziness, weakness, numbness and headaches. There were no vitals filed for this visit. There is no height or weight on file to calculate BMI. Wt Readings from Last 3 Encounters:   07/25/21 127 lb 10.3 oz (57.9 kg)   02/24/21 84 lb 14 oz (38.5 kg)   02/02/21 98 lb 8 oz (44.7 kg)     BP Readings from Last 3 Encounters:   07/26/21 109/71   02/24/21 112/69   02/02/21 100/64       Physical Exam  Constitutional:       Appearance: Normal appearance. She is normal weight.    HENT: Head: Normocephalic. Nose: Nose normal.   Eyes:      Extraocular Movements: Extraocular movements intact. Conjunctiva/sclera: Conjunctivae normal.   Skin:     General: Skin is dry. Neurological:      Mental Status: She is alert and oriented to person, place, and time. Mental status is at baseline. Psychiatric:         Mood and Affect: Mood normal.         Behavior: Behavior normal.         Thought Content: Thought content normal.         Judgment: Judgment normal.         Assessment/Plan:  1. Uncontrolled type 1 diabetes mellitus with hyperglycemia (HCC)  - MI DISCHARGE MEDS RECONCILED W/ CURRENT OUTPATIENT MED LIST        Medical Decision Making: moderate complexity    She was recently discharged from the hospital on July 26. At that time she was admitted for diabetic ketoacidosis. On presentation her blood glucose was 700. She was started on insulin drip and admitted to intensive care. She previously was on an insulin pump but has not been using it. She admitted to missing her insulin and she also had a cold. She was discharged home on 25 units of Lantus as well as sliding scale and 8 units of prandial insulin. Her discharge hemoglobin was also low at 9.4 thought to be secondary to dilution. Her glucose has been well controlled. She does have some pain when she eats.       She is following up with endocrinology on August 9    I will order a CBC to recheck her hemoglobin count    She relates that her glucose is better controlled    She does continue to have some nausea    She may benefit from following up with gastroenterology if this issue persists despite adequate control of her blood glucose    I will give her refills of Lantus

## 2021-08-06 RX ORDER — INSULIN GLARGINE 100 [IU]/ML
25 INJECTION, SOLUTION SUBCUTANEOUS NIGHTLY
Qty: 5 PEN | Refills: 3 | Status: SHIPPED
Start: 2021-08-06 | End: 2021-08-24

## 2021-08-06 NOTE — TELEPHONE ENCOUNTER
Pharmacy sent communication that Mario Pressley is not on patient's formulary. The preferred insulin is Lantus. Can we end a new script for Lantus in place of Basaglar? Med pended below.

## 2021-08-16 ENCOUNTER — TELEPHONE (OUTPATIENT)
Dept: ENDOCRINOLOGY | Age: 26
End: 2021-08-16

## 2021-08-16 NOTE — TELEPHONE ENCOUNTER
Please call patient and inform her we can try at Levemir which would be the same dose of Lantus and NovoLog which would be the same carb ratio as Humalog. Please clarify doses with patient and I will send if patient would like to try different insulins.   Also please inform patient if any chest pain or worsening shortness of breath occurs she should go to the emergency department

## 2021-08-16 NOTE — TELEPHONE ENCOUNTER
Patient called into office with concerns with new insulin. Pt feels out of breath and dizzy for 2 hours after injection. Please advise.

## 2021-08-17 NOTE — TELEPHONE ENCOUNTER
Called patient this morning to confirm insulin dosages. Pt states she is on novolog sliding scale, 1 unit every 8 carbs and lantus 25 units at night. Informed that she should go to ER if worsening SOB occurs. Pt says she is willing to try different insulins and confirmed pharmacy at [facility].

## 2021-08-18 RX ORDER — INSULIN ASPART 100 [IU]/ML
INJECTION, SOLUTION INTRAVENOUS; SUBCUTANEOUS
Qty: 5 PEN | Refills: 3 | Status: SHIPPED
Start: 2021-08-18 | End: 2021-08-24 | Stop reason: SDUPTHER

## 2021-08-18 RX ORDER — INSULIN DETEMIR 100 [IU]/ML
25 INJECTION, SOLUTION SUBCUTANEOUS NIGHTLY
Qty: 5 PEN | Refills: 3 | Status: SHIPPED
Start: 2021-08-18 | End: 2021-08-24 | Stop reason: SDUPTHER

## 2021-08-24 ENCOUNTER — OFFICE VISIT (OUTPATIENT)
Dept: ENDOCRINOLOGY | Age: 26
End: 2021-08-24
Payer: COMMERCIAL

## 2021-08-24 VITALS
DIASTOLIC BLOOD PRESSURE: 67 MMHG | BODY MASS INDEX: 19.26 KG/M2 | WEIGHT: 102 LBS | HEART RATE: 114 BPM | HEIGHT: 61 IN | OXYGEN SATURATION: 99 % | SYSTOLIC BLOOD PRESSURE: 96 MMHG

## 2021-08-24 DIAGNOSIS — E55.9 VITAMIN D DEFICIENCY: ICD-10-CM

## 2021-08-24 DIAGNOSIS — E10.42 TYPE 1 DIABETES MELLITUS WITH POLYNEUROPATHY (HCC): ICD-10-CM

## 2021-08-24 DIAGNOSIS — E10.65 TYPE 1 DIABETES MELLITUS WITH HYPERGLYCEMIA (HCC): Primary | ICD-10-CM

## 2021-08-24 DIAGNOSIS — Z91.119 DIETARY NONCOMPLIANCE: ICD-10-CM

## 2021-08-24 DIAGNOSIS — Z91.199 HISTORY OF NONCOMPLIANCE WITH MEDICAL TREATMENT: ICD-10-CM

## 2021-08-24 PROCEDURE — 1111F DSCHRG MED/CURRENT MED MERGE: CPT | Performed by: CLINICAL NURSE SPECIALIST

## 2021-08-24 PROCEDURE — G8420 CALC BMI NORM PARAMETERS: HCPCS | Performed by: CLINICAL NURSE SPECIALIST

## 2021-08-24 PROCEDURE — G8427 DOCREV CUR MEDS BY ELIG CLIN: HCPCS | Performed by: CLINICAL NURSE SPECIALIST

## 2021-08-24 PROCEDURE — 2022F DILAT RTA XM EVC RTNOPTHY: CPT | Performed by: CLINICAL NURSE SPECIALIST

## 2021-08-24 PROCEDURE — 1036F TOBACCO NON-USER: CPT | Performed by: CLINICAL NURSE SPECIALIST

## 2021-08-24 PROCEDURE — 99214 OFFICE O/P EST MOD 30 MIN: CPT | Performed by: CLINICAL NURSE SPECIALIST

## 2021-08-24 PROCEDURE — 3046F HEMOGLOBIN A1C LEVEL >9.0%: CPT | Performed by: CLINICAL NURSE SPECIALIST

## 2021-08-24 RX ORDER — INSULIN DETEMIR 100 [IU]/ML
25 INJECTION, SOLUTION SUBCUTANEOUS EVERY MORNING
Qty: 5 PEN | Refills: 3
Start: 2021-08-24 | End: 2021-09-09 | Stop reason: CLARIF

## 2021-08-24 RX ORDER — INSULIN ASPART 100 [IU]/ML
INJECTION, SOLUTION INTRAVENOUS; SUBCUTANEOUS
Qty: 5 PEN | Refills: 3 | Status: ON HOLD
Start: 2021-08-24 | End: 2021-09-18 | Stop reason: SDUPTHER

## 2021-08-24 NOTE — PROGRESS NOTES
[address] [place] [address] Department of Endocrinology Diabetes and Metabolism   [address] [facility] [address]   Phone: [phone]  Fax: [phone]    Date of Service: 8/24/2021    Primary Care Physician: Carly Bustillos DO  Referring physician: No ref. provider found  Provider: Lucila Cortes     Reason for the visit: Type 1 diabetes       History of Present Illness: The history is provided by the patient. No  was used. Accuracy of the patient data is excellent. Mary Muñoz is a very pleasant 23 y.o. female seen today for follow up visit      Mary Muñoz was diagnosed with diabetes at age 8 and currently on insulin pump (started 8/2020)   Pt was having having sweating, shortness of breath  and dizziness that she related to insulin use. She was changes drom lantus and insulin lispro to levemir and Novolog   She has been taking both Levemir and Lantus. She takes levemir as ISS with meals she was confused with how to take it . Should be on 25 units at night    Basaglar 25 units at night, insulin lispro 1:8 + ss 1:50>150   She misses doses often   Pt has insulin pump at home, but not using it (she doesn't like anything attached to her body)   The patient has been checking BS 2-3 times a day with meals and at bedtime. Reading 200's        Lab Results   Component Value Date    LABA1C 15.1 07/26/2021    LABA1C 11.2 10/18/2020    LABA1C 13.0 05/30/2020     Her diabetes complicated with gastroparesis   I reviewed current medications and the patient has no issues with diabetes medications  Mary Muñoz is due for eye exam   The patient performs her own feet care and doesn't see podiatry service   Microvascular complications: + Neuropathy. No Retinopathy, No Nephropathy     Macrovascular complications: no CAD, PVD, or Stroke  The patient receives Flushot every year. She has not received the pneumonia vaccine.     PAST MEDICAL HISTORY   Past Medical History:   Diagnosis Date    Bleeding at insertion site 1/5/2018    Common femoral artery injury, right, initial encounter 1/5/2018    Depressive disorder     DM type 1 (diabetes mellitus, type 1) [facility]        PAST SURGICAL HISTORY   Past Surgical History:   Procedure Laterality Date    ABDOMEN SURGERY N/A 5/9/2019    INCISION AND DRAINAGE OF SUPRAPUBIC ABSESS performed by Jay Jay Jones MD at [address] [facility]      last yr       SOCIAL HISTORY   Tobacco:   reports that she has never smoked. She has never used smokeless tobacco.  Alcohol:   reports no history of alcohol use. Drugs:   reports current drug use. Drug: Marijuana. FAMILY HISTORY   Family History   Problem Relation Age of Onset    Diabetes Maternal Grandmother     Diabetes Paternal Grandmother     Stroke Other     Thyroid Disease Neg Hx        ALLERGIES AND DRUG REACTIONS   No Known Allergies    CURRENT MEDICATIONS   Current Outpatient Medications   Medication Sig Dispense Refill    insulin aspart (NOVOLOG FLEXPEN) 100 UNIT/ML injection pen 1 unit for every 8 grams of carbs, plus ISS. Max dose 50 units 5 pen 3    insulin detemir (LEVEMIR FLEXTOUCH) 100 UNIT/ML injection pen Inject 25 Units into the skin every morning 5 pen 3    Insulin Pen Needle (BD PEN NEEDLE SHELBIE U/F) 32G X 4 MM MISC Uses with insulin 4 times a day 300 each 5    NOVOLOG FLEXPEN 100 UNIT/ML injection pen PER SLIDING SCALE. MAX OF 60 UNITS DAILY (Patient not taking: Reported on 8/5/2021)       No current facility-administered medications for this visit. Review of Systems  Constitutional: No fever, no chills, no diaphoresis, no generalized weakness. HEENT: No blurred vision, No sore throat, no ear pain, no hair loss  Neck: denied any neck swelling, difficulty swallowing,   Cardio-pulmonary: No CP, SOB or palpitation, No orthopnea or PND. No cough or wheezing.   GI: No N/V/D, no constipation, No abdominal pain, no melena or hematochezia : Denied any dysuria, hematuria, flank pain, discharge, or incontinence. Skin: denied any rash, ulcer, Hirsute, or hyperpigmentation. MSK: denied any joint deformity, joint pain/swelling, muscle pain, or back pain. Neuro: no numbness, no tingling, no weakness, _    OBJECTIVE    BP 96/67   Pulse 114   Ht 5' 1\" (1.549 m)   Wt 102 lb (46.3 kg)   SpO2 99%   BMI 19.27 kg/m²   BP Readings from Last 4 Encounters:   08/24/21 96/67   07/26/21 109/71   02/24/21 112/69   02/02/21 100/64     Wt Readings from Last 6 Encounters:   08/24/21 102 lb (46.3 kg)   07/25/21 127 lb 10.3 oz (57.9 kg)   02/24/21 84 lb 14 oz (38.5 kg)   02/02/21 98 lb 8 oz (44.7 kg)   01/14/21 91 lb (41.3 kg)   12/18/20 95 lb (43.1 kg)       Physical examination:  General: awake alert, oriented x3, no abnormal position or movements. HEENT: normocephalic non-traumatic, no exophthalmos   Neck: supple, no LN enlargement, no thyromegaly, no thyroid tenderness, no JVD. Pulm: Clear equal air entry no added sounds, no wheezing or rhonchi    CVS: S1 + S2, no murmur, no heave. Dorsalis pedis pulse palpable   Abd: soft lax, no tenderness, no organomegaly, audible bowel sounds. Skin: warm, no lesions, no rash.  No callus, no Ulcers, No acanthosis nigricans  Musculoskeletal: No back tenderness, no kyphosis/scoliosis    Neuro: CN intact, Monofilament sensation decreased bilateral , muscle power normal  Psych: normal mood, and affect      Review of Laboratory Data:  I personally reviewed the following lab:  Lab Results   Component Value Date/Time    WBC 9.3 07/26/2021 06:37 AM    RBC 3.87 07/26/2021 06:37 AM    HGB 10.6 (L) 07/26/2021 06:37 AM    HCT 32.0 (L) 07/26/2021 06:37 AM    MCV 82.7 07/26/2021 06:37 AM    MCH 27.4 07/26/2021 06:37 AM    MCHC 33.1 07/26/2021 06:37 AM    RDW 13.8 07/26/2021 06:37 AM     07/26/2021 06:37 AM    MPV 10.3 07/26/2021 06:37 AM      Lab Results   Component Value Date/Time     (L) 07/26/2021 09:22 AM    K 4.6 07/26/2021 09:22 AM    K 5.9 (H) 07/25/2021 08:27 PM    CO2 16 (L) 07/26/2021 09:22 AM    BUN 13 07/26/2021 09:22 AM    CREATININE 0.5 07/26/2021 09:22 AM    CALCIUM 7.9 (L) 07/26/2021 09:22 AM    LABGLOM >60 07/26/2021 09:22 AM    GFRAA >60 07/26/2021 09:22 AM      No results found for: TSH, T4FREE, N5OFXLL, FT3, G2YYVYS, TSI, TPOABS, THGAB  Lab Results   Component Value Date    LABA1C 15.1 07/26/2021    GLUCOSE 196 07/26/2021    MALBCR 204.6 01/23/2018    LABMICR 102.3 01/23/2018    LABCREA 12 10/18/2020     Lab Results   Component Value Date    LABA1C 15.1 07/26/2021    LABA1C 11.2 10/18/2020    LABA1C 13.0 05/30/2020     Lab Results   Component Value Date    TRIG 106 01/24/2018    HDL 40 01/24/2018    LDLCALC 114 01/24/2018    CHOL 175 01/24/2018     No results found for: [address], a 23 y.o.-old female seen in for the following issues      Diagnosis Orders   1. Type 1 diabetes mellitus with hyperglycemia (HCC)  Comprehensive Metabolic Panel    Lipid Panel    Microalbumin / Creatinine Urine Ratio    TSH without Reflex   2. Dietary noncompliance     3. History of noncompliance with medical treatment     4. Vitamin D deficiency  Vitamin D 25 Hydroxy   5. Type 1 diabetes mellitus with polyneuropathy (HCC)         Diabetes Mellitus Type  1   · Patient's diabetes not well controlled. Patient has been noncompliant with medication use. Patient thinks she will do better at taking basal insulin if she takes it in the morning. Will change Levemir to 25 units every morning. Patient was taking Levemir on a sliding scale and also using Basaglar at the same time. I counseled patient on insulin use and the appropriate way to use insulin. Advised patient to continue NovoLog 1 unit for every 8 g of carbohydrates +1 unit for every 50 above 150. Patient is not interested in insulin pump.     · Patient does not like freestyle theo  · Will attempt Dexcom  · Patient states she checks blood sugars 2-3 times per day  · She did not bring meter or blood glucose logs with her to today's visit  · The patient was advised to check blood sugars 4 times a day before meals and at bedtime and send BS readings to our office in a week. · Discussed with patient A1c and blood sugar goals   · Advised to call if blood glucose less than 70 or greater than 250 3 consecutive times  · The patient counseled about the complications of uncontrolled diabetes   · Patient was counselled about the importance of self-blood glucose monitoring and eating consistent carb diet to avoid blood sugar fluctuations g   · Discussed lifestyle changes including diet and exercise with patient; recommended 150 minutes of moderate intensity exercise per week. · Diabetes labs before next visit     Dietary noncompliance   Discussed with patient the importance of eating consistent carbohydrate meals, avoiding high glycemic index food. Also, discussed with patient the risk and negative consequences of dietary noncompliance on blood glucose control, blood pressure and weight    Medical noncompliance  -Compliance encouraged. Vitamin D Def   -We will check vitamin D    Type 1 diabetes mellitus with polyneuropathy  -Advised optimal foot care    I personally spent greater than 30 minutes reviewing external notes from PCP and other patient's care team providers, and personally interpreted labs associated with the above diagnosis. I also ordered labs to further assess and manage the above addressed medical conditions. Return in about 3 months (around 11/24/2021). The above issues were reviewed with the patient who understood and agreed with the plan. Thank you for allowing us to participate in the care of this patient. Please do not hesitate to contact us with any additional questions.      Coni English CNS   Endocrinologist, [facility] Diabetes Care and Endocrinology   [address] [facility] [address]   Phone: [phone]  Fax: [phone]  --------------------------------------------  An electronic signature was used to authenticate this note.  Kasie ONEAL on 8/24/2021 at 3:02 PM

## 2021-08-25 ENCOUNTER — TELEPHONE (OUTPATIENT)
Dept: ENDOCRINOLOGY | Age: 26
End: 2021-08-25

## 2021-08-25 NOTE — TELEPHONE ENCOUNTER
An Antolin Certain was submitted for the pt's Novolog Flexpens to [facility] through [facility].

## 2021-08-26 ENCOUNTER — TELEPHONE (OUTPATIENT)
Dept: ENDOCRINOLOGY | Age: 26
End: 2021-08-26

## 2021-08-26 NOTE — TELEPHONE ENCOUNTER
----- Message from MIKE Tovar sent at 8/25/2021  7:13 AM EDT -----  Please call patient and inform her her urine test showed mild protein leak.   Advised optimal blood glucose control

## 2021-09-02 ENCOUNTER — TELEPHONE (OUTPATIENT)
Dept: ENDOCRINOLOGY | Age: 26
End: 2021-09-02

## 2021-09-02 NOTE — TELEPHONE ENCOUNTER
Jaymie called stating that patient needs another Prior Auth sent over and it needs to state patient can not take other insulin because of issues. [phone] if any questions.

## 2021-09-09 DIAGNOSIS — E10.65 TYPE 1 DIABETES MELLITUS WITH HYPERGLYCEMIA (HCC): Primary | ICD-10-CM

## 2021-09-09 RX ORDER — INSULIN GLARGINE 100 [IU]/ML
25 INJECTION, SOLUTION SUBCUTANEOUS EVERY MORNING
Qty: 5 PEN | Refills: 5 | Status: ON HOLD
Start: 2021-09-09 | End: 2021-09-18 | Stop reason: HOSPADM

## 2021-09-12 PROCEDURE — 96361 HYDRATE IV INFUSION ADD-ON: CPT

## 2021-09-12 PROCEDURE — 99284 EMERGENCY DEPT VISIT MOD MDM: CPT

## 2021-09-12 PROCEDURE — 96374 THER/PROPH/DIAG INJ IV PUSH: CPT

## 2021-09-12 ASSESSMENT — PAIN SCALES - GENERAL: PAINLEVEL_OUTOF10: 8

## 2021-09-12 ASSESSMENT — PAIN DESCRIPTION - PAIN TYPE: TYPE: ACUTE PAIN

## 2021-09-12 ASSESSMENT — PAIN DESCRIPTION - LOCATION: LOCATION: BACK

## 2021-09-12 ASSESSMENT — PAIN DESCRIPTION - ORIENTATION: ORIENTATION: LOWER

## 2021-09-13 ENCOUNTER — HOSPITAL ENCOUNTER (EMERGENCY)
Age: 26
Discharge: HOME | DRG: 420 | End: 2021-09-13
Attending: EMERGENCY MEDICINE | Admitting: INTERNAL MEDICINE
Payer: COMMERCIAL

## 2021-09-13 ENCOUNTER — TELEPHONE (OUTPATIENT)
Dept: ENDOCRINOLOGY | Age: 26
End: 2021-09-13

## 2021-09-13 VITALS
SYSTOLIC BLOOD PRESSURE: 132 MMHG | HEART RATE: 88 BPM | DIASTOLIC BLOOD PRESSURE: 72 MMHG | TEMPERATURE: 98.2 F | RESPIRATION RATE: 14 BRPM | OXYGEN SATURATION: 98 %

## 2021-09-13 DIAGNOSIS — R11.2 NON-INTRACTABLE VOMITING WITH NAUSEA, UNSPECIFIED VOMITING TYPE: ICD-10-CM

## 2021-09-13 DIAGNOSIS — E11.65 HYPERGLYCEMIA DUE TO DIABETES MELLITUS (HCC): Primary | ICD-10-CM

## 2021-09-13 LAB
ALBUMIN SERPL-MCNC: 4.7 G/DL (ref 3.5–5.2)
ALP BLD-CCNC: 108 U/L (ref 35–104)
ALT SERPL-CCNC: 20 U/L (ref 0–32)
ANION GAP SERPL CALCULATED.3IONS-SCNC: 14 MMOL/L (ref 7–16)
ANION GAP SERPL CALCULATED.3IONS-SCNC: 14 MMOL/L (ref 7–16)
AST SERPL-CCNC: 24 U/L (ref 0–31)
BACTERIA: ABNORMAL /HPF
BASOPHILS ABSOLUTE: 0.05 E9/L (ref 0–0.2)
BASOPHILS RELATIVE PERCENT: 0.7 % (ref 0–2)
BETA-HYDROXYBUTYRATE: 2.23 MMOL/L (ref 0.02–0.27)
BILIRUB SERPL-MCNC: 0.4 MG/DL (ref 0–1.2)
BILIRUBIN URINE: NEGATIVE
BLOOD, URINE: ABNORMAL
BUN BLDV-MCNC: 12 MG/DL (ref 6–20)
BUN BLDV-MCNC: 14 MG/DL (ref 6–20)
CALCIUM SERPL-MCNC: 8.6 MG/DL (ref 8.6–10.2)
CALCIUM SERPL-MCNC: 9.6 MG/DL (ref 8.6–10.2)
CHLORIDE BLD-SCNC: 100 MMOL/L (ref 98–107)
CHLORIDE BLD-SCNC: 85 MMOL/L (ref 98–107)
CHP ED QC CHECK: NORMAL
CLARITY: CLEAR
CO2: 16 MMOL/L (ref 22–29)
CO2: 17 MMOL/L (ref 22–29)
COLOR: ABNORMAL
CREAT SERPL-MCNC: 0.6 MG/DL (ref 0.5–1)
CREAT SERPL-MCNC: 0.7 MG/DL (ref 0.5–1)
EKG ATRIAL RATE: 108 BPM
EKG P AXIS: 67 DEGREES
EKG P-R INTERVAL: 152 MS
EKG Q-T INTERVAL: 356 MS
EKG QRS DURATION: 70 MS
EKG QTC CALCULATION (BAZETT): 477 MS
EKG R AXIS: 62 DEGREES
EKG T AXIS: 47 DEGREES
EKG VENTRICULAR RATE: 108 BPM
EOSINOPHILS ABSOLUTE: 0.03 E9/L (ref 0.05–0.5)
EOSINOPHILS RELATIVE PERCENT: 0.4 % (ref 0–6)
GFR AFRICAN AMERICAN: >60
GFR AFRICAN AMERICAN: >60
GFR NON-AFRICAN AMERICAN: >60 ML/MIN/1.73
GFR NON-AFRICAN AMERICAN: >60 ML/MIN/1.73
GLUCOSE BLD-MCNC: 401 MG/DL (ref 74–99)
GLUCOSE BLD-MCNC: 427 MG/DL
GLUCOSE BLD-MCNC: 911 MG/DL (ref 74–99)
GLUCOSE URINE: >=1000 MG/DL
HCG, URINE, POC: NEGATIVE
HCT VFR BLD CALC: 43.7 % (ref 34–48)
HEMOGLOBIN: 14.5 G/DL (ref 11.5–15.5)
IMMATURE GRANULOCYTES #: 0.01 E9/L
IMMATURE GRANULOCYTES %: 0.1 % (ref 0–5)
KETONES, URINE: ABNORMAL MG/DL
LACTIC ACID, SEPSIS: 1.6 MMOL/L (ref 0.5–1.9)
LEUKOCYTE ESTERASE, URINE: ABNORMAL
LYMPHOCYTES ABSOLUTE: 2.34 E9/L (ref 1.5–4)
LYMPHOCYTES RELATIVE PERCENT: 34.9 % (ref 20–42)
Lab: NORMAL
MCH RBC QN AUTO: 27.7 PG (ref 26–35)
MCHC RBC AUTO-ENTMCNC: 33.2 % (ref 32–34.5)
MCV RBC AUTO: 83.4 FL (ref 80–99.9)
METER GLUCOSE: 427 MG/DL (ref 74–99)
METER GLUCOSE: >500 MG/DL (ref 74–99)
MONOCYTES ABSOLUTE: 0.36 E9/L (ref 0.1–0.95)
MONOCYTES RELATIVE PERCENT: 5.4 % (ref 2–12)
NEGATIVE QC PASS/FAIL: NORMAL
NEUTROPHILS ABSOLUTE: 3.91 E9/L (ref 1.8–7.3)
NEUTROPHILS RELATIVE PERCENT: 58.5 % (ref 43–80)
NITRITE, URINE: NEGATIVE
PDW BLD-RTO: 12.9 FL (ref 11.5–15)
PH UA: 6 (ref 5–9)
PH VENOUS: 7.28 (ref 7.35–7.45)
PLATELET # BLD: 277 E9/L (ref 130–450)
PMV BLD AUTO: 11.3 FL (ref 7–12)
POSITIVE QC PASS/FAIL: NORMAL
POTASSIUM REFLEX MAGNESIUM: 3.7 MMOL/L (ref 3.5–5)
POTASSIUM SERPL-SCNC: 5.5 MMOL/L (ref 3.5–5)
PROTEIN UA: NEGATIVE MG/DL
RBC # BLD: 5.24 E12/L (ref 3.5–5.5)
RBC UA: ABNORMAL /HPF (ref 0–2)
SODIUM BLD-SCNC: 116 MMOL/L (ref 132–146)
SODIUM BLD-SCNC: 130 MMOL/L (ref 132–146)
SPECIFIC GRAVITY UA: <=1.005 (ref 1–1.03)
TOTAL PROTEIN: 8.9 G/DL (ref 6.4–8.3)
TROPONIN, HIGH SENSITIVITY: <6 NG/L (ref 0–9)
UROBILINOGEN, URINE: 0.2 E.U./DL
WBC # BLD: 6.7 E9/L (ref 4.5–11.5)
WBC UA: >20 /HPF (ref 0–5)

## 2021-09-13 PROCEDURE — 93010 ELECTROCARDIOGRAM REPORT: CPT | Performed by: INTERNAL MEDICINE

## 2021-09-13 PROCEDURE — 1200000000 HC SEMI PRIVATE

## 2021-09-13 PROCEDURE — 80048 BASIC METABOLIC PNL TOTAL CA: CPT

## 2021-09-13 PROCEDURE — 83605 ASSAY OF LACTIC ACID: CPT

## 2021-09-13 PROCEDURE — 6370000000 HC RX 637 (ALT 250 FOR IP): Performed by: EMERGENCY MEDICINE

## 2021-09-13 PROCEDURE — 36415 COLL VENOUS BLD VENIPUNCTURE: CPT

## 2021-09-13 PROCEDURE — 99254 IP/OBS CNSLTJ NEW/EST MOD 60: CPT | Performed by: INTERNAL MEDICINE

## 2021-09-13 PROCEDURE — 85025 COMPLETE CBC W/AUTO DIFF WBC: CPT

## 2021-09-13 PROCEDURE — 2580000003 HC RX 258: Performed by: EMERGENCY MEDICINE

## 2021-09-13 PROCEDURE — 93005 ELECTROCARDIOGRAM TRACING: CPT | Performed by: EMERGENCY MEDICINE

## 2021-09-13 PROCEDURE — 82010 KETONE BODYS QUAN: CPT

## 2021-09-13 PROCEDURE — 82800 BLOOD PH: CPT

## 2021-09-13 PROCEDURE — 84484 ASSAY OF TROPONIN QUANT: CPT

## 2021-09-13 PROCEDURE — 82962 GLUCOSE BLOOD TEST: CPT

## 2021-09-13 PROCEDURE — 81001 URINALYSIS AUTO W/SCOPE: CPT

## 2021-09-13 PROCEDURE — 80053 COMPREHEN METABOLIC PANEL: CPT

## 2021-09-13 RX ORDER — TRAMADOL HYDROCHLORIDE 50 MG/1
50 TABLET ORAL ONCE
Status: COMPLETED | OUTPATIENT
Start: 2021-09-13 | End: 2021-09-13

## 2021-09-13 RX ORDER — 0.9 % SODIUM CHLORIDE 0.9 %
1000 INTRAVENOUS SOLUTION INTRAVENOUS ONCE
Status: COMPLETED | OUTPATIENT
Start: 2021-09-13 | End: 2021-09-13

## 2021-09-13 RX ORDER — INSULIN GLARGINE 100 [IU]/ML
25 INJECTION, SOLUTION SUBCUTANEOUS ONCE
Status: COMPLETED | OUTPATIENT
Start: 2021-09-13 | End: 2021-09-13

## 2021-09-13 RX ORDER — ONDANSETRON 4 MG/1
4 TABLET, ORALLY DISINTEGRATING ORAL EVERY 8 HOURS PRN
Qty: 6 TABLET | Refills: 0 | Status: SHIPPED | OUTPATIENT
Start: 2021-09-13 | End: 2021-09-15

## 2021-09-13 RX ADMIN — SODIUM CHLORIDE 1000 ML: 9 INJECTION, SOLUTION INTRAVENOUS at 01:29

## 2021-09-13 RX ADMIN — INSULIN HUMAN 10 UNITS: 100 INJECTION, SOLUTION PARENTERAL at 01:50

## 2021-09-13 RX ADMIN — INSULIN GLARGINE 25 UNITS: 100 INJECTION, SOLUTION SUBCUTANEOUS at 07:29

## 2021-09-13 RX ADMIN — SODIUM CHLORIDE 1000 ML: 9 INJECTION, SOLUTION INTRAVENOUS at 01:49

## 2021-09-13 RX ADMIN — TRAMADOL HYDROCHLORIDE 50 MG: 50 TABLET, FILM COATED ORAL at 04:06

## 2021-09-13 ASSESSMENT — PAIN SCALES - GENERAL: PAINLEVEL_OUTOF10: 8

## 2021-09-13 NOTE — TELEPHONE ENCOUNTER
Please call patient and inquire what sensation she is having when she takes her Levemir. We can change her to Lantus or basaglar   and I did send prescription in for [employer] last office visit.   If prior authorization needs completed please complete

## 2021-09-13 NOTE — ED PROVIDER NOTES
HPI:  9/13/21,    Time: 1:38 AM EDT       Naya Dixon is a 23 y.o. female presenting to the ED for elevated blood sugars with occasional nausea and vomiting, beginning 2 days ago. The complaint has been persistent, moderate in severity, and worsened by nothing. 22-year-old female who states that she has had diabetes for 12 years. She follows with endocrinology. She states that she is on long-acting and short-acting insulin. She states that Dr. Hathaway Render both both of her insulins 2 weeks ago. She states she does not like the new medication she states they make her \"feel weird\". She has had occasional nausea vomiting over the past 2 days only once or twice a day. No abdominal pain no fevers or chills no chest pain or shortness of breath;no urinary complaints. No dysuria urgency or frequency. She states she feels her blood sugars been going up they were over 500 today. She also vaguely complains of some dull low back pain. She states this is \"what happens when my blood sugars go up\". Patient admits that she has not been taking her short acting insulin as regularly over the past couple days as she just does not like the way that makes her feel. Review of Systems:   Pertinent positives and negatives are stated within HPI, all other systems reviewed and are negative.          --------------------------------------------- PAST HISTORY ---------------------------------------------  Past Medical History:  has a past medical history of Bleeding at insertion site, Common femoral artery injury, right, initial encounter, Depressive disorder, and DM type 1 [facility] Past Surgical History:  has a past surgical history that includes Abdomen surgery (N/A, 5/9/2019) and Bartholin gland cyst excision. Social History:  reports that she has never smoked. She has never used smokeless tobacco. She reports current drug use. Drug: Marijuana.  She reports that she does not drink alcohol. Family History: family history includes Diabetes in her maternal grandmother and paternal grandmother; Stroke in an other family member. The patients home medications have been reviewed. Allergies: Patient has no known allergies. ---------------------------------------------------PHYSICAL EXAM--------------------------------------    Constitutional/General: Alert and oriented x3, well appearing, non toxic in NAD; thin  Head: Normocephalic and atraumatic  Eyes: PERRL, EOMI, conjunctive normal, sclera non icteric  Mouth: Oropharynx clear, handling secretions, no trismus, no asymmetry of the posterior oropharynx or uvular edema  Neck: Supple, full ROM, non tender to palpation in the midline, no stridor, no crepitus, no meningeal signs  Respiratory: Lungs clear to auscultation bilaterally, no wheezes, rales, or rhonchi. Not in respiratory distress  Cardiovascular:  Regular rate. Regular rhythm. No murmurs, gallops, or rubs. 2+ distal pulses  Chest: No chest wall tenderness  GI:  Abdomen Soft, Non tender, Non distended. +BS. No organomegaly, no palpable masses,  No rebound, guarding, or rigidity. Musculoskeletal: Moves all extremities x 4. Warm and well perfused, no clubbing, cyanosis, or edema. Capillary refill <3 seconds  Integument: skin warm and dry. No rashes. Lymphatic: no lymphadenopathy noted  Neurologic: GCS 15, no focal deficits, symmetric strength 5/5 in the upper and lower extremities bilaterally  Psychiatric: Normal Affect    -------------------------------------------------- RESULTS -------------------------------------------------  I have personally reviewed all laboratory and imaging results for this patient. Results are listed below.      LABS:  Results for orders placed or performed during the hospital encounter of 09/13/21   CBC auto differential   Result Value Ref Range    WBC 6.7 4.5 - 11.5 E9/L    RBC 5.24 3.50 - 5.50 E12/L    Hemoglobin 14.5 11.5 - 15.5 g/dL Hematocrit 43.7 34.0 - 48.0 %    MCV 83.4 80.0 - 99.9 fL    MCH 27.7 26.0 - 35.0 pg    MCHC 33.2 32.0 - 34.5 %    RDW 12.9 11.5 - 15.0 fL    Platelets 802 194 - 749 E9/L    MPV 11.3 7.0 - 12.0 fL    Neutrophils % 58.5 43.0 - 80.0 %    Immature Granulocytes % 0.1 0.0 - 5.0 %    Lymphocytes % 34.9 20.0 - 42.0 %    Monocytes % 5.4 2.0 - 12.0 %    Eosinophils % 0.4 0.0 - 6.0 %    Basophils % 0.7 0.0 - 2.0 %    Neutrophils Absolute 3.91 1.80 - 7.30 E9/L    Immature Granulocytes # 0.01 E9/L    Lymphocytes Absolute 2.34 1.50 - 4.00 E9/L    Monocytes Absolute 0.36 0.10 - 0.95 E9/L    Eosinophils Absolute 0.03 (L) 0.05 - 0.50 E9/L    Basophils Absolute 0.05 0.00 - 0.20 E9/L   Comprehensive metabolic panel   Result Value Ref Range    Sodium 116 (LL) 132 - 146 mmol/L    Potassium 5.5 (H) 3.5 - 5.0 mmol/L    Chloride 85 (L) 98 - 107 mmol/L    CO2 17 (L) 22 - 29 mmol/L    Anion Gap 14 7 - 16 mmol/L    Glucose 911 (HH) 74 - 99 mg/dL    BUN 14 6 - 20 mg/dL    CREATININE 0.7 0.5 - 1.0 mg/dL    GFR Non-African American >60 >=60 mL/min/1.73    GFR African American >60     Calcium 9.6 8.6 - 10.2 mg/dL    Total Protein 8.9 (H) 6.4 - 8.3 g/dL    Albumin 4.7 3.5 - 5.2 g/dL    Total Bilirubin 0.4 0.0 - 1.2 mg/dL    Alkaline Phosphatase 108 (H) 35 - 104 U/L    ALT 20 0 - 32 U/L    AST 24 0 - 31 U/L   Troponin   Result Value Ref Range    Troponin, High Sensitivity <6 0 - 9 ng/L   PH, VENOUS   Result Value Ref Range    pH, Christoph 7.28 (L) 7.35 - 7.45   Beta-Hydroxybutyrate   Result Value Ref Range    Beta-Hydroxybutyrate 2.23 (H) 0.02 - 0.27 mmol/L   Lactate, Sepsis   Result Value Ref Range    Lactic Acid, Sepsis 1.6 0.5 - 1.9 mmol/L   URINALYSIS   Result Value Ref Range    Color, UA Straw Straw/Yellow    Clarity, UA Clear Clear    Glucose, Ur >=1000 (A) Negative mg/dL    Bilirubin Urine Negative Negative    Ketones, Urine TRACE (A) Negative mg/dL    Specific Gravity, UA <=1.005 1.005 - 1.030    Blood, Urine TRACE-LYSED Negative    pH, UA 6.0 5.0 - 9.0    Protein, UA Negative Negative mg/dL    Urobilinogen, Urine 0.2 <2.0 E.U./dL    Nitrite, Urine Negative Negative    Leukocyte Esterase, Urine MODERATE (A) Negative   Microscopic Urinalysis   Result Value Ref Range    WBC, UA >20 (A) 0 - 5 /HPF    RBC, UA 2-5 0 - 2 /HPF    Bacteria, UA FEW (A) None Seen /HPF   Basic Metabolic Panel w/ Reflex to MG   Result Value Ref Range    Sodium 130 (L) 132 - 146 mmol/L    Potassium reflex Magnesium 3.7 3.5 - 5.0 mmol/L    Chloride 100 98 - 107 mmol/L    CO2 16 (L) 22 - 29 mmol/L    Anion Gap 14 7 - 16 mmol/L    Glucose 401 (H) 74 - 99 mg/dL    BUN 12 6 - 20 mg/dL    CREATININE 0.6 0.5 - 1.0 mg/dL    GFR Non-African American >60 >=60 mL/min/1.73    GFR African American >60     Calcium 8.6 8.6 - 10.2 mg/dL   POC Pregnancy Urine Qual   Result Value Ref Range    HCG, Urine, POC Negative Negative    Lot Number [id number]     Positive QC Pass/Fail Pass     Negative QC Pass/Fail Pass    POCT glucose   Result Value Ref Range    Glucose 427 mg/dL    QC OK? okay    POCT Glucose   Result Value Ref Range    Meter Glucose >500 (H) 74 - 99 mg/dL   POCT Glucose   Result Value Ref Range    Meter Glucose 427 (H) 74 - 99 mg/dL   EKG 12 Lead   Result Value Ref Range    Ventricular Rate 108 BPM    Atrial Rate 108 BPM    P-R Interval 152 ms    QRS Duration 70 ms    Q-T Interval 356 ms    QTc Calculation (Bazett) 477 ms    P Axis 67 degrees    R Axis 62 degrees    T Axis 47 degrees       RADIOLOGY:  Interpreted by Radiologist.  No orders to display       EKG:  Patient EKG shows sinus tachycardia 108 beats a minute no signs of any ST changes. QTc 477. No change from previous EKG.      ------------------------- NURSING NOTES AND VITALS REVIEWED ---------------------------   The nursing notes within the ED encounter and vital signs as below have been reviewed by myself.   /72   Pulse 88   Temp 98.2 °F (36.8 °C) (Oral)   Resp 14   SpO2 98%   Oxygen Saturation Interpretation: Normal    The patients available past medical records and past encounters were reviewed. ------------------------------ ED COURSE/MEDICAL DECISION MAKING----------------------  Medications   0.9 % sodium chloride bolus (0 mLs IntraVENous Stopped 9/13/21 0347)   0.9 % sodium chloride bolus (0 mLs IntraVENous Stopped 9/13/21 0347)   insulin regular (HUMULIN R;NOVOLIN R) injection 10 Units (10 Units IntraVENous Given 9/13/21 0150)   traMADol (ULTRAM) tablet 50 mg (50 mg Oral Given 9/13/21 0406)   insulin glargine (LANTUS) injection vial 25 Units (25 Units SubCUTAneous Given 9/13/21 0729)         ED COURSE:  ED Course as of Sep 15 2304   Mon Sep 13, 2021   0129 Corrected sodium for hyperglycemia is 129. Kianna Shabazz who accept the patient to an observation admission for endocrine consult regarding patient's insulin regimen. Gwendolyn Shabazz came down to evaluate the patient. He had a discussion with the patient. It seems that medications for both her short and long-acting insulin were switched because of formulary changes with her insurance. Her endocrinologist is trying to allow her to go back to her other medications but they are awaiting authorization. Patient at this point does agree to continue taking the medications that she does not enjoy that much until she can get official approval to go back to the other medications. She is not in DKA at this time. He does not feel further medical management would warrant this. He recommends that I give her long-acting dose of 25 units of Lantus prior to discharge and she can follow-up outpatient with her endocrinologist as well as her PCP. [KK]      ED Course User Index  [KK] Delores De La Torre MD       Medical Decision Making:    Patient is 16-year-old female last admitted to the hospital in July for DKA. Insulin-dependent diabetic who admits to poor compliance recently with her new insulin regimen.   She has had occasional nausea vomiting last 2 days no abdominal pain no fevers. Her blood sugar is greater than 500 here will check lab work and continue to hydrate give IV fluids and likely IV insulin. Differential diagnosis DKA hyperglycemia dehydration and electrolyte abnormality UTI      This patient has remained hemodynamically stable during their ED course. EKG shows normal sinus tachycardia at 108 beats a minute no signs of any ST changes. Patient had a CBC that was normal and a chemistry that initially showed a sodium of 116 With a glucose of 911. CO2 was only 17 normal anion gap of 14. Patient not in DKA. She was given 2 L of IV fluids. Her corrected sodium was 130. She was given IV insulin bolus as well. Venous pH was 7.28 but hydroxybutyrate was 2.23. Troponin was negative lactic acid is 1.6. Patient showed moderate leuks but no nitrites on urine denies any dysuria urgency or frequency no recent fevers. Will follow culture. Urine pregnancy was negative. After IV fluids and IV insulin chemistry was repeated sodium was improved to 130 CO2 was only 16 normal gap of 14 glucose was down to 401. Consulted the hospitalist Dr. Marilee Ramos, who came and evaluated the patient. He spoke extensively with the patient. He does not feel that the patient requires admission. The patient  states her endocrinologist is working to get her back on her previous insulin regimens. Patient does not want to stay in the hospital.  She is in agreement with continuing with the medication she has at home to actually take her long-acting and short acting insulin until her doctor can get her back on her old meds. Patient is tolerating p.o. fluids stable vital signs she is comfortable plan to be discharged home she will return for any worsening symptoms she is stable on discharge. Re-Evaluations:             Re-evaluation.   Patients symptoms improving    Re-examination  9/13/21   1:38 AM EDT          Vital Signs:   Vitals:    09/13/21 0021 09/13/21 0348 09/13/21 0730   BP: 116/68 134/68 132/72   Pulse: 125 98 88   Resp: 14 14 14   Temp: 97 °F (36.1 °C)  98.2 °F (36.8 °C)   TempSrc: Temporal  Oral   SpO2: 100% 98% 98%         Counseling: The emergency provider has spoken with the patient and discussed todays results, in addition to providing specific details for the plan of care and counseling regarding the diagnosis and prognosis. Questions are answered at this time and they are agreeable with the plan. CRITICAL CARE:  38 MINUTES. Please note that the withdrawal or failure to initiate urgent interventions for this patient would likely result in a life threatening deterioration or permanent disability. Accordingly this patient received the above mentioned time, excluding separately billable procedures. --------------------------------- IMPRESSION AND DISPOSITION ---------------------------------    IMPRESSION  1. Hyperglycemia due to diabetes mellitus ([facility]    2. Non-intractable vomiting with nausea, unspecified vomiting type        DISPOSITION  Disposition: Discharge to home  Patient condition is stable    NOTE: This report was transcribed using voice recognition software.  Every effort was made to ensure accuracy; however, inadvertent computerized transcription errors may be present        Venkatesh Chapman MD  09/15/21 7801

## 2021-09-13 NOTE — Clinical Note
Patient Class: Inpatient [101]  REQUIRED: Diagnosis: [facility]gly[facility] due [facility] diabetes mellitus [facility] [[id number]]  Estimated Length of Stay: Estimated stay of more than 2 midnights  Admitting Provider: Yara Villalpando [[id number]]  Telemetry/Cardiac Monitorin g Required?: Yes

## 2021-09-13 NOTE — CONSULTS
[facility]   Consult for Medical Management      Reason for Consult:  hyperglycemia     History of Present Illness: 32-year-old female with history of diabetes mellitus type 1 following with endocrinology. She was recently switched to an alternative formulation of her insulin, Basaglar was switched to Levemir and NovoLog  to lispro. States that she believes she started to feel different with this change. Just describes as a funny feeling. She discussed with her endocrinologist's NP and pharmacy requested prior authorization to switch her back to [employer]. The patient has been in contact with her insurance company but has not gotten approval.  Yesterday morning her blood sugar was 380. She did not take Levemir because of that sensation. Last night her blood sugar was reported as high on her meter therefore presented to ED for further evaluation. Found to have a blood sugar of 900. Sodium 160. She was given fluids and insulin with improvement of her blood sugar down to 400. Informant(s) for H&P: Patient    REVIEW OF SYSTEMS:  A comprehensive review of systems was negative except for: what is in the HPI    PMH:  Past Medical History:   Diagnosis Date    Bleeding at insertion site 1/5/2018    Common femoral artery injury, right, initial encounter 1/5/2018    Depressive disorder     DM type 1 (diabetes mellitus, type 1) ([facility]        Surgical History:  Past Surgical History:   Procedure Laterality Date    ABDOMEN SURGERY N/A 5/9/2019    INCISION AND DRAINAGE OF SUPRAPUBIC ABSESS performed by Kayode Steinberg MD at [address]      last yr       Medications Prior to Admission:    Prior to Admission medications    Medication Sig Start Date End Date Taking?  Authorizing Provider   insulin glargine (LANTUS SOLOSTAR) 100 UNIT/ML injection pen Inject 25 Units into the skin every morning 9/9/21   MIKE Mcknight   insulin aspart (NOVOLOG FLEXPEN) 100 UNIT/ML injection pen 1 unit for every 8 grams of carbs, plus ISS. Max dose 50 units 8/24/21   Nuris Gallegos, APRN - CNS   Insulin Pen Needle (BD PEN NEEDLE SHELBIE U/F) 32G X 4 MM MISC Uses with insulin 4 times a day 3/17/21   Britton Wong MD   NOVOLOG FLEXPEN 100 UNIT/ML injection pen PER SLIDING SCALE. MAX OF 60 UNITS DAILY  Patient not taking: Reported on 8/5/2021 1/26/21   Historical Provider, MD       Allergies:    Patient has no known allergies. Social History:    reports that she has never smoked. She has never used smokeless tobacco. She reports current drug use. Drug: Marijuana. She reports that she does not drink alcohol. Family History:   family history includes Diabetes in her maternal grandmother and paternal grandmother; Stroke in an other family member.       PHYSICAL EXAM:  Vitals:  /68   Pulse 98   Temp 97 °F (36.1 °C) (Temporal)   Resp 14   SpO2 98%   General Appearance: alert and oriented to person, place and time and in no acute distress  Skin: warm and dry  Head: normocephalic and atraumatic  Eyes: pupils equal, round, and reactive to light, extraocular eye movements intact, conjunctivae normal  Neck: neck supple and non tender without mass   Pulmonary/Chest: clear to auscultation bilaterally- no wheezes, rales or rhonchi, normal air movement, no respiratory distress  Cardiovascular: normal rate, normal S1 and S2 and no carotid bruits  Abdomen: soft, non-tender, non-distended, normal bowel sounds, no masses or organomegaly  Extremities: no cyanosis, no clubbing and no edema  Neurologic: no cranial nerve deficit and speech normal        LABS:  Recent Labs     09/13/21  0004 09/13/21  0347 09/13/21  0413   *  --  130*   K 5.5*  --  3.7   CL 85*  --  100   CO2 17*  --  16*   BUN 14  --  12   CREATININE 0.7  --  0.6   GLUCOSE 911* 427 401*   CALCIUM 9.6  --  8.6       Recent Labs     09/13/21  0004   WBC 6.7   RBC 5.24   HGB 14.5   HCT 43.7   MCV 83.4   MCH 27.7   MCHC 33.2   RDW 12.9      MPV 11.3       No results for input(s): POCGLU in the last 72 hours. ASSESSMENT:      Active Problems:    Hyperglycemia due to diabetes mellitus ([address]    PLAN:  Discussed with patient, she is agreeable to continue taking Levemir despite the abnormal sensation she has until she can get approval for Basaglar. Blood sugar and sodium improved with 10 units regular insulin in the ED. Administer 25 units of Lantus her home dose in the ED and okay to discharge home  She was instructed to contact her endocrinologist's NP for further management    Thank you very much for this interesting consult and we will continue to follow along. Feel free to call with any questions at [phone]. NOTE: This report was transcribed using voice recognition software. Every effort was made to ensure accuracy; however, inadvertent computerized transcription errors may be present.   Electronically signed by Carlos Sam MD on 9/13/2021 at 6:25 AM

## 2021-09-16 ENCOUNTER — HOSPITAL ENCOUNTER (INPATIENT)
Age: 26
LOS: 1 days | Discharge: HOME | DRG: 420 | End: 2021-09-18
Attending: STUDENT IN AN ORGANIZED HEALTH CARE EDUCATION/TRAINING PROGRAM | Admitting: INTERNAL MEDICINE
Payer: COMMERCIAL

## 2021-09-16 DIAGNOSIS — E10.10 DIABETIC KETOACIDOSIS WITHOUT COMA ASSOCIATED WITH TYPE 1 DIABETES MELLITUS (HCC): Primary | ICD-10-CM

## 2021-09-16 PROCEDURE — 99284 EMERGENCY DEPT VISIT MOD MDM: CPT

## 2021-09-16 PROCEDURE — 96375 TX/PRO/DX INJ NEW DRUG ADDON: CPT

## 2021-09-16 PROCEDURE — 96374 THER/PROPH/DIAG INJ IV PUSH: CPT

## 2021-09-17 ENCOUNTER — APPOINTMENT (OUTPATIENT)
Dept: GENERAL RADIOLOGY | Age: 26
DRG: 420 | End: 2021-09-17
Payer: COMMERCIAL

## 2021-09-17 LAB
ALBUMIN SERPL-MCNC: 4.3 G/DL (ref 3.5–5.2)
ALP BLD-CCNC: 100 U/L (ref 35–104)
ALT SERPL-CCNC: 26 U/L (ref 0–32)
ANION GAP SERPL CALCULATED.3IONS-SCNC: 11 MMOL/L (ref 7–16)
ANION GAP SERPL CALCULATED.3IONS-SCNC: 22 MMOL/L (ref 7–16)
ANION GAP SERPL CALCULATED.3IONS-SCNC: 30 MMOL/L (ref 7–16)
ANION GAP SERPL CALCULATED.3IONS-SCNC: 31 MMOL/L (ref 7–16)
ANION GAP SERPL CALCULATED.3IONS-SCNC: 9 MMOL/L (ref 7–16)
ANION GAP SERPL CALCULATED.3IONS-SCNC: 9 MMOL/L (ref 7–16)
AST SERPL-CCNC: 49 U/L (ref 0–31)
BASOPHILS ABSOLUTE: 0.06 E9/L (ref 0–0.2)
BASOPHILS ABSOLUTE: 0.07 E9/L (ref 0–0.2)
BASOPHILS RELATIVE PERCENT: 0.3 % (ref 0–2)
BASOPHILS RELATIVE PERCENT: 0.5 % (ref 0–2)
BETA-HYDROXYBUTYRATE: >4.5 MMOL/L (ref 0.02–0.27)
BILIRUB SERPL-MCNC: 0.6 MG/DL (ref 0–1.2)
BILIRUBIN URINE: NEGATIVE
BLOOD, URINE: NEGATIVE
BUN BLDV-MCNC: 10 MG/DL (ref 6–20)
BUN BLDV-MCNC: 11 MG/DL (ref 6–20)
BUN BLDV-MCNC: 14 MG/DL (ref 6–20)
BUN BLDV-MCNC: 16 MG/DL (ref 6–20)
BUN BLDV-MCNC: 18 MG/DL (ref 6–20)
BUN BLDV-MCNC: 19 MG/DL (ref 6–20)
CALCIUM SERPL-MCNC: 11.3 MG/DL (ref 8.6–10.2)
CALCIUM SERPL-MCNC: 8.3 MG/DL (ref 8.6–10.2)
CALCIUM SERPL-MCNC: 8.5 MG/DL (ref 8.6–10.2)
CALCIUM SERPL-MCNC: 8.7 MG/DL (ref 8.6–10.2)
CALCIUM SERPL-MCNC: 8.9 MG/DL (ref 8.6–10.2)
CALCIUM SERPL-MCNC: 9.6 MG/DL (ref 8.6–10.2)
CHLORIDE BLD-SCNC: 109 MMOL/L (ref 98–107)
CHLORIDE BLD-SCNC: 112 MMOL/L (ref 98–107)
CHLORIDE BLD-SCNC: 87 MMOL/L (ref 98–107)
CHLORIDE BLD-SCNC: 99 MMOL/L (ref 98–107)
CLARITY: CLEAR
CO2: 13 MMOL/L (ref 22–29)
CO2: 14 MMOL/L (ref 22–29)
CO2: 15 MMOL/L (ref 22–29)
CO2: 3 MMOL/L (ref 22–29)
CO2: 6 MMOL/L (ref 22–29)
CO2: 7 MMOL/L (ref 22–29)
COLOR: YELLOW
CREAT SERPL-MCNC: 0.6 MG/DL (ref 0.5–1)
CREAT SERPL-MCNC: 0.7 MG/DL (ref 0.5–1)
CREAT SERPL-MCNC: 0.7 MG/DL (ref 0.5–1)
EKG ATRIAL RATE: 126 BPM
EKG P AXIS: 83 DEGREES
EKG P-R INTERVAL: 142 MS
EKG Q-T INTERVAL: 320 MS
EKG QRS DURATION: 70 MS
EKG QTC CALCULATION (BAZETT): 463 MS
EKG R AXIS: 66 DEGREES
EKG T AXIS: 60 DEGREES
EKG VENTRICULAR RATE: 126 BPM
EOSINOPHILS ABSOLUTE: 0.01 E9/L (ref 0.05–0.5)
EOSINOPHILS ABSOLUTE: 0.01 E9/L (ref 0.05–0.5)
EOSINOPHILS RELATIVE PERCENT: 0 % (ref 0–6)
EOSINOPHILS RELATIVE PERCENT: 0.1 % (ref 0–6)
GFR AFRICAN AMERICAN: >60
GFR NON-AFRICAN AMERICAN: >60 ML/MIN/1.73
GLUCOSE BLD-MCNC: 140 MG/DL (ref 74–99)
GLUCOSE BLD-MCNC: 161 MG/DL (ref 74–99)
GLUCOSE BLD-MCNC: 175 MG/DL (ref 74–99)
GLUCOSE BLD-MCNC: 242 MG/DL (ref 74–99)
GLUCOSE BLD-MCNC: 575 MG/DL (ref 74–99)
GLUCOSE BLD-MCNC: 673 MG/DL (ref 74–99)
GLUCOSE URINE: >=1000 MG/DL
HBA1C MFR BLD: 14.2 % (ref 4–5.6)
HCG(URINE) PREGNANCY TEST: NEGATIVE
HCT VFR BLD CALC: 34.8 % (ref 34–48)
HCT VFR BLD CALC: 43.4 % (ref 34–48)
HEMOGLOBIN: 11.4 G/DL (ref 11.5–15.5)
HEMOGLOBIN: 14.2 G/DL (ref 11.5–15.5)
IMMATURE GRANULOCYTES #: 0.04 E9/L
IMMATURE GRANULOCYTES #: 0.15 E9/L
IMMATURE GRANULOCYTES %: 0.3 % (ref 0–5)
IMMATURE GRANULOCYTES %: 0.7 % (ref 0–5)
KETONES, URINE: >=80 MG/DL
LEUKOCYTE ESTERASE, URINE: NEGATIVE
LYMPHOCYTES ABSOLUTE: 3.5 E9/L (ref 1.5–4)
LYMPHOCYTES ABSOLUTE: 5.16 E9/L (ref 1.5–4)
LYMPHOCYTES RELATIVE PERCENT: 24.6 % (ref 20–42)
LYMPHOCYTES RELATIVE PERCENT: 26.5 % (ref 20–42)
MAGNESIUM: 1.5 MG/DL (ref 1.6–2.6)
MAGNESIUM: 1.8 MG/DL (ref 1.6–2.6)
MAGNESIUM: 1.8 MG/DL (ref 1.6–2.6)
MAGNESIUM: 1.9 MG/DL (ref 1.6–2.6)
MAGNESIUM: 2.1 MG/DL (ref 1.6–2.6)
MCH RBC QN AUTO: 27.7 PG (ref 26–35)
MCH RBC QN AUTO: 27.9 PG (ref 26–35)
MCHC RBC AUTO-ENTMCNC: 32.7 % (ref 32–34.5)
MCHC RBC AUTO-ENTMCNC: 32.8 % (ref 32–34.5)
MCV RBC AUTO: 84.7 FL (ref 80–99.9)
MCV RBC AUTO: 85.3 FL (ref 80–99.9)
METER GLUCOSE: 114 MG/DL (ref 74–99)
METER GLUCOSE: 125 MG/DL (ref 74–99)
METER GLUCOSE: 129 MG/DL (ref 74–99)
METER GLUCOSE: 134 MG/DL (ref 74–99)
METER GLUCOSE: 144 MG/DL (ref 74–99)
METER GLUCOSE: 156 MG/DL (ref 74–99)
METER GLUCOSE: 161 MG/DL (ref 74–99)
METER GLUCOSE: 165 MG/DL (ref 74–99)
METER GLUCOSE: 168 MG/DL (ref 74–99)
METER GLUCOSE: 195 MG/DL (ref 74–99)
METER GLUCOSE: 217 MG/DL (ref 74–99)
METER GLUCOSE: 219 MG/DL (ref 74–99)
METER GLUCOSE: 221 MG/DL (ref 74–99)
METER GLUCOSE: 247 MG/DL (ref 74–99)
METER GLUCOSE: 313 MG/DL (ref 74–99)
METER GLUCOSE: >500 MG/DL (ref 74–99)
MONOCYTES ABSOLUTE: 0.47 E9/L (ref 0.1–0.95)
MONOCYTES ABSOLUTE: 0.93 E9/L (ref 0.1–0.95)
MONOCYTES RELATIVE PERCENT: 3.6 % (ref 2–12)
MONOCYTES RELATIVE PERCENT: 4.4 % (ref 2–12)
NEUTROPHILS ABSOLUTE: 14.69 E9/L (ref 1.8–7.3)
NEUTROPHILS ABSOLUTE: 9.13 E9/L (ref 1.8–7.3)
NEUTROPHILS RELATIVE PERCENT: 69 % (ref 43–80)
NEUTROPHILS RELATIVE PERCENT: 70 % (ref 43–80)
NITRITE, URINE: NEGATIVE
PDW BLD-RTO: 13.1 FL (ref 11.5–15)
PDW BLD-RTO: 13.2 FL (ref 11.5–15)
PH UA: 5.5 (ref 5–9)
PH VENOUS: 7.14 (ref 7.35–7.45)
PHOSPHORUS: 2.2 MG/DL (ref 2.5–4.5)
PHOSPHORUS: 3.1 MG/DL (ref 2.5–4.5)
PHOSPHORUS: 3.4 MG/DL (ref 2.5–4.5)
PHOSPHORUS: 3.9 MG/DL (ref 2.5–4.5)
PHOSPHORUS: 5.6 MG/DL (ref 2.5–4.5)
PLATELET # BLD: 327 E9/L (ref 130–450)
PLATELET # BLD: 358 E9/L (ref 130–450)
PMV BLD AUTO: 10.6 FL (ref 7–12)
PMV BLD AUTO: 11.5 FL (ref 7–12)
POTASSIUM REFLEX MAGNESIUM: 6.2 MMOL/L (ref 3.5–5)
POTASSIUM SERPL-SCNC: 3.8 MMOL/L (ref 3.5–5)
POTASSIUM SERPL-SCNC: 4.3 MMOL/L (ref 3.5–5)
POTASSIUM SERPL-SCNC: 4.7 MMOL/L (ref 3.5–5)
POTASSIUM SERPL-SCNC: 5.2 MMOL/L (ref 3.5–5)
POTASSIUM SERPL-SCNC: 5.2 MMOL/L (ref 3.5–5)
POTASSIUM SERPL-SCNC: 5.3 MMOL/L (ref 3.5–5)
PROTEIN UA: NEGATIVE MG/DL
RBC # BLD: 4.11 E12/L (ref 3.5–5.5)
RBC # BLD: 5.09 E12/L (ref 3.5–5.5)
SARS-COV-2, NAAT: NOT DETECTED
SODIUM BLD-SCNC: 124 MMOL/L (ref 132–146)
SODIUM BLD-SCNC: 133 MMOL/L (ref 132–146)
SODIUM BLD-SCNC: 135 MMOL/L (ref 132–146)
SODIUM BLD-SCNC: 137 MMOL/L (ref 132–146)
SPECIFIC GRAVITY UA: 1.02 (ref 1–1.03)
TOTAL PROTEIN: 8.6 G/DL (ref 6.4–8.3)
UROBILINOGEN, URINE: 0.2 E.U./DL
WBC # BLD: 13.2 E9/L (ref 4.5–11.5)
WBC # BLD: 21 E9/L (ref 4.5–11.5)

## 2021-09-17 PROCEDURE — 71045 X-RAY EXAM CHEST 1 VIEW: CPT

## 2021-09-17 PROCEDURE — 2500000003 HC RX 250 WO HCPCS: Performed by: INTERNAL MEDICINE

## 2021-09-17 PROCEDURE — 93005 ELECTROCARDIOGRAM TRACING: CPT | Performed by: STUDENT IN AN ORGANIZED HEALTH CARE EDUCATION/TRAINING PROGRAM

## 2021-09-17 PROCEDURE — 84132 ASSAY OF SERUM POTASSIUM: CPT

## 2021-09-17 PROCEDURE — 2580000003 HC RX 258: Performed by: INTERNAL MEDICINE

## 2021-09-17 PROCEDURE — 87635 SARS-COV-2 COVID-19 AMP PRB: CPT

## 2021-09-17 PROCEDURE — 99254 IP/OBS CNSLTJ NEW/EST MOD 60: CPT | Performed by: INTERNAL MEDICINE

## 2021-09-17 PROCEDURE — 82962 GLUCOSE BLOOD TEST: CPT

## 2021-09-17 PROCEDURE — 6370000000 HC RX 637 (ALT 250 FOR IP): Performed by: INTERNAL MEDICINE

## 2021-09-17 PROCEDURE — 85025 COMPLETE CBC W/AUTO DIFF WBC: CPT

## 2021-09-17 PROCEDURE — 2000000000 HC ICU R&B

## 2021-09-17 PROCEDURE — 80053 COMPREHEN METABOLIC PANEL: CPT

## 2021-09-17 PROCEDURE — 2580000003 HC RX 258: Performed by: STUDENT IN AN ORGANIZED HEALTH CARE EDUCATION/TRAINING PROGRAM

## 2021-09-17 PROCEDURE — 2500000003 HC RX 250 WO HCPCS: Performed by: STUDENT IN AN ORGANIZED HEALTH CARE EDUCATION/TRAINING PROGRAM

## 2021-09-17 PROCEDURE — 6360000002 HC RX W HCPCS: Performed by: STUDENT IN AN ORGANIZED HEALTH CARE EDUCATION/TRAINING PROGRAM

## 2021-09-17 PROCEDURE — 36415 COLL VENOUS BLD VENIPUNCTURE: CPT

## 2021-09-17 PROCEDURE — 99222 1ST HOSP IP/OBS MODERATE 55: CPT | Performed by: INTERNAL MEDICINE

## 2021-09-17 PROCEDURE — 87088 URINE BACTERIA CULTURE: CPT

## 2021-09-17 PROCEDURE — 93010 ELECTROCARDIOGRAM REPORT: CPT | Performed by: INTERNAL MEDICINE

## 2021-09-17 PROCEDURE — 6360000002 HC RX W HCPCS: Performed by: INTERNAL MEDICINE

## 2021-09-17 PROCEDURE — 2580000003 HC RX 258: Performed by: EMERGENCY MEDICINE

## 2021-09-17 PROCEDURE — 82010 KETONE BODYS QUAN: CPT

## 2021-09-17 PROCEDURE — 82800 BLOOD PH: CPT

## 2021-09-17 PROCEDURE — 6360000002 HC RX W HCPCS: Performed by: EMERGENCY MEDICINE

## 2021-09-17 PROCEDURE — 83735 ASSAY OF MAGNESIUM: CPT

## 2021-09-17 PROCEDURE — 87081 CULTURE SCREEN ONLY: CPT

## 2021-09-17 PROCEDURE — 81025 URINE PREGNANCY TEST: CPT

## 2021-09-17 PROCEDURE — 84100 ASSAY OF PHOSPHORUS: CPT

## 2021-09-17 PROCEDURE — 6370000000 HC RX 637 (ALT 250 FOR IP): Performed by: EMERGENCY MEDICINE

## 2021-09-17 PROCEDURE — 2580000003 HC RX 258

## 2021-09-17 PROCEDURE — 83036 HEMOGLOBIN GLYCOSYLATED A1C: CPT

## 2021-09-17 PROCEDURE — 81003 URINALYSIS AUTO W/O SCOPE: CPT

## 2021-09-17 PROCEDURE — 80048 BASIC METABOLIC PNL TOTAL CA: CPT

## 2021-09-17 RX ORDER — POTASSIUM CHLORIDE 7.45 MG/ML
10 INJECTION INTRAVENOUS PRN
Status: DISCONTINUED | OUTPATIENT
Start: 2021-09-17 | End: 2021-09-17

## 2021-09-17 RX ORDER — 0.9 % SODIUM CHLORIDE 0.9 %
1000 INTRAVENOUS SOLUTION INTRAVENOUS ONCE
Status: COMPLETED | OUTPATIENT
Start: 2021-09-17 | End: 2021-09-17

## 2021-09-17 RX ORDER — INSULIN GLARGINE 100 [IU]/ML
20 INJECTION, SOLUTION SUBCUTANEOUS NIGHTLY
Status: DISCONTINUED | OUTPATIENT
Start: 2021-09-17 | End: 2021-09-18

## 2021-09-17 RX ORDER — MORPHINE SULFATE 2 MG/ML
2 INJECTION, SOLUTION INTRAMUSCULAR; INTRAVENOUS
Status: DISCONTINUED | OUTPATIENT
Start: 2021-09-17 | End: 2021-09-18 | Stop reason: HOSPADM

## 2021-09-17 RX ORDER — SODIUM CHLORIDE 0.9 % (FLUSH) 0.9 %
5-40 SYRINGE (ML) INJECTION 2 TIMES DAILY
Status: DISCONTINUED | OUTPATIENT
Start: 2021-09-17 | End: 2021-09-18 | Stop reason: HOSPADM

## 2021-09-17 RX ORDER — ONDANSETRON 2 MG/ML
4 INJECTION INTRAMUSCULAR; INTRAVENOUS ONCE
Status: COMPLETED | OUTPATIENT
Start: 2021-09-17 | End: 2021-09-17

## 2021-09-17 RX ORDER — DEXTROSE MONOHYDRATE 25 G/50ML
12.5 INJECTION, SOLUTION INTRAVENOUS PRN
Status: DISCONTINUED | OUTPATIENT
Start: 2021-09-17 | End: 2021-09-18 | Stop reason: HOSPADM

## 2021-09-17 RX ORDER — FENTANYL CITRATE 50 UG/ML
25 INJECTION, SOLUTION INTRAMUSCULAR; INTRAVENOUS ONCE
Status: COMPLETED | OUTPATIENT
Start: 2021-09-17 | End: 2021-09-17

## 2021-09-17 RX ORDER — MAGNESIUM SULFATE 1 G/100ML
1000 INJECTION INTRAVENOUS PRN
Status: DISCONTINUED | OUTPATIENT
Start: 2021-09-17 | End: 2021-09-17

## 2021-09-17 RX ORDER — SODIUM CHLORIDE 0.9 % (FLUSH) 0.9 %
SYRINGE (ML) INJECTION
Status: COMPLETED
Start: 2021-09-17 | End: 2021-09-17

## 2021-09-17 RX ORDER — SODIUM CHLORIDE 9 MG/ML
INJECTION, SOLUTION INTRAVENOUS CONTINUOUS
Status: DISCONTINUED | OUTPATIENT
Start: 2021-09-17 | End: 2021-09-17

## 2021-09-17 RX ORDER — SODIUM CHLORIDE 0.9 % (FLUSH) 0.9 %
5-40 SYRINGE (ML) INJECTION PRN
Status: DISCONTINUED | OUTPATIENT
Start: 2021-09-17 | End: 2021-09-18 | Stop reason: HOSPADM

## 2021-09-17 RX ORDER — NICOTINE POLACRILEX 4 MG
15 LOZENGE BUCCAL PRN
Status: DISCONTINUED | OUTPATIENT
Start: 2021-09-17 | End: 2021-09-18 | Stop reason: HOSPADM

## 2021-09-17 RX ORDER — KETOROLAC TROMETHAMINE 30 MG/ML
15 INJECTION, SOLUTION INTRAMUSCULAR; INTRAVENOUS ONCE
Status: COMPLETED | OUTPATIENT
Start: 2021-09-17 | End: 2021-09-17

## 2021-09-17 RX ORDER — DEXTROSE, SODIUM CHLORIDE, AND POTASSIUM CHLORIDE 5; .45; .15 G/100ML; G/100ML; G/100ML
INJECTION INTRAVENOUS CONTINUOUS PRN
Status: DISCONTINUED | OUTPATIENT
Start: 2021-09-17 | End: 2021-09-18

## 2021-09-17 RX ORDER — DEXTROSE MONOHYDRATE 50 MG/ML
100 INJECTION, SOLUTION INTRAVENOUS PRN
Status: DISCONTINUED | OUTPATIENT
Start: 2021-09-17 | End: 2021-09-18 | Stop reason: HOSPADM

## 2021-09-17 RX ADMIN — MORPHINE SULFATE 2 MG: 2 INJECTION, SOLUTION INTRAMUSCULAR; INTRAVENOUS at 14:40

## 2021-09-17 RX ADMIN — SODIUM CHLORIDE, PRESERVATIVE FREE 10 ML: 5 INJECTION INTRAVENOUS at 14:41

## 2021-09-17 RX ADMIN — BENZOCAINE AND MENTHOL 1 LOZENGE: 15; 3.6 LOZENGE ORAL at 12:24

## 2021-09-17 RX ADMIN — POTASSIUM CHLORIDE 10 MEQ: 7.46 INJECTION, SOLUTION INTRAVENOUS at 17:03

## 2021-09-17 RX ADMIN — MORPHINE SULFATE 2 MG: 2 INJECTION, SOLUTION INTRAMUSCULAR; INTRAVENOUS at 11:32

## 2021-09-17 RX ADMIN — INSULIN GLARGINE 20 UNITS: 100 INJECTION, SOLUTION SUBCUTANEOUS at 20:04

## 2021-09-17 RX ADMIN — POTASSIUM CHLORIDE, DEXTROSE MONOHYDRATE AND SODIUM CHLORIDE: 150; 5; 450 INJECTION, SOLUTION INTRAVENOUS at 14:45

## 2021-09-17 RX ADMIN — BENZOCAINE AND MENTHOL 1 LOZENGE: 15; 3.6 LOZENGE ORAL at 09:00

## 2021-09-17 RX ADMIN — POTASSIUM CHLORIDE 10 MEQ: 7.46 INJECTION, SOLUTION INTRAVENOUS at 11:13

## 2021-09-17 RX ADMIN — BENZOCAINE AND MENTHOL 1 LOZENGE: 15; 3.6 LOZENGE ORAL at 14:46

## 2021-09-17 RX ADMIN — Medication 10 ML: at 20:12

## 2021-09-17 RX ADMIN — MORPHINE SULFATE 2 MG: 2 INJECTION, SOLUTION INTRAMUSCULAR; INTRAVENOUS at 20:11

## 2021-09-17 RX ADMIN — SODIUM CHLORIDE 1000 ML: 9 INJECTION, SOLUTION INTRAVENOUS at 00:45

## 2021-09-17 RX ADMIN — FENTANYL CITRATE 25 MCG: 0.05 INJECTION, SOLUTION INTRAMUSCULAR; INTRAVENOUS at 02:45

## 2021-09-17 RX ADMIN — FAMOTIDINE 20 MG: 10 INJECTION, SOLUTION INTRAVENOUS at 00:52

## 2021-09-17 RX ADMIN — POTASSIUM CHLORIDE 10 MEQ: 7.46 INJECTION, SOLUTION INTRAVENOUS at 12:19

## 2021-09-17 RX ADMIN — BENZOCAINE AND MENTHOL 1 LOZENGE: 15; 3.6 LOZENGE ORAL at 08:41

## 2021-09-17 RX ADMIN — SODIUM CHLORIDE, PRESERVATIVE FREE 10 ML: 5 INJECTION INTRAVENOUS at 14:28

## 2021-09-17 RX ADMIN — SODIUM CHLORIDE 5.04 UNITS/HR: 9 INJECTION, SOLUTION INTRAVENOUS at 04:02

## 2021-09-17 RX ADMIN — Medication 10 ML: at 08:34

## 2021-09-17 RX ADMIN — BENZOCAINE AND MENTHOL 1 LOZENGE: 15; 3.6 LOZENGE ORAL at 13:15

## 2021-09-17 RX ADMIN — BENZOCAINE AND MENTHOL 1 LOZENGE: 15; 3.6 LOZENGE ORAL at 14:00

## 2021-09-17 RX ADMIN — BENZOCAINE AND MENTHOL 1 LOZENGE: 15; 3.6 LOZENGE ORAL at 10:00

## 2021-09-17 RX ADMIN — BENZOCAINE AND MENTHOL 1 LOZENGE: 15; 3.6 LOZENGE ORAL at 11:00

## 2021-09-17 RX ADMIN — ONDANSETRON 4 MG: 2 INJECTION INTRAMUSCULAR; INTRAVENOUS at 00:52

## 2021-09-17 RX ADMIN — INSULIN LISPRO 6 UNITS: 100 INJECTION, SOLUTION INTRAVENOUS; SUBCUTANEOUS at 20:05

## 2021-09-17 RX ADMIN — SODIUM CHLORIDE, PRESERVATIVE FREE 10 ML: 5 INJECTION INTRAVENOUS at 08:34

## 2021-09-17 RX ADMIN — SODIUM CHLORIDE: 9 INJECTION, SOLUTION INTRAVENOUS at 07:35

## 2021-09-17 RX ADMIN — SODIUM PHOSPHATE, MONOBASIC, MONOHYDRATE 15 MMOL: 276; 142 INJECTION, SOLUTION INTRAVENOUS at 15:30

## 2021-09-17 RX ADMIN — SODIUM CHLORIDE 1000 ML: 9 INJECTION, SOLUTION INTRAVENOUS at 00:47

## 2021-09-17 RX ADMIN — MORPHINE SULFATE 2 MG: 2 INJECTION, SOLUTION INTRAMUSCULAR; INTRAVENOUS at 08:33

## 2021-09-17 RX ADMIN — SODIUM CHLORIDE, PRESERVATIVE FREE 10 ML: 5 INJECTION INTRAVENOUS at 11:14

## 2021-09-17 RX ADMIN — SODIUM CHLORIDE: 9 INJECTION, SOLUTION INTRAVENOUS at 07:06

## 2021-09-17 RX ADMIN — INSULIN LISPRO 1 UNITS: 100 INJECTION, SOLUTION INTRAVENOUS; SUBCUTANEOUS at 20:04

## 2021-09-17 RX ADMIN — KETOROLAC TROMETHAMINE 15 MG: 30 INJECTION, SOLUTION INTRAMUSCULAR; INTRAVENOUS at 01:54

## 2021-09-17 RX ADMIN — MORPHINE SULFATE 2 MG: 2 INJECTION, SOLUTION INTRAMUSCULAR; INTRAVENOUS at 23:58

## 2021-09-17 RX ADMIN — POTASSIUM CHLORIDE 10 MEQ: 7.46 INJECTION, SOLUTION INTRAVENOUS at 15:19

## 2021-09-17 RX ADMIN — POTASSIUM CHLORIDE, DEXTROSE MONOHYDRATE AND SODIUM CHLORIDE: 150; 5; 450 INJECTION, SOLUTION INTRAVENOUS at 08:31

## 2021-09-17 RX ADMIN — BENZOCAINE AND MENTHOL 1 LOZENGE: 15; 3.6 LOZENGE ORAL at 07:10

## 2021-09-17 ASSESSMENT — PAIN DESCRIPTION - PAIN TYPE
TYPE: ACUTE PAIN

## 2021-09-17 ASSESSMENT — PAIN SCALES - GENERAL
PAINLEVEL_OUTOF10: 8
PAINLEVEL_OUTOF10: 7
PAINLEVEL_OUTOF10: 8
PAINLEVEL_OUTOF10: 8
PAINLEVEL_OUTOF10: 4
PAINLEVEL_OUTOF10: 5
PAINLEVEL_OUTOF10: 10
PAINLEVEL_OUTOF10: 7
PAINLEVEL_OUTOF10: 10
PAINLEVEL_OUTOF10: 2
PAINLEVEL_OUTOF10: 10
PAINLEVEL_OUTOF10: 7
PAINLEVEL_OUTOF10: 8
PAINLEVEL_OUTOF10: 0
PAINLEVEL_OUTOF10: 0

## 2021-09-17 ASSESSMENT — PAIN DESCRIPTION - LOCATION
LOCATION: BACK

## 2021-09-17 ASSESSMENT — PAIN DESCRIPTION - ORIENTATION
ORIENTATION: LOWER

## 2021-09-17 NOTE — PATIENT CARE CONFERENCE
Intensive Care Daily Quality Rounding Checklist      ICU Team Members: Dr. Arianna Epps (resident), charge nurse, bedside nurse    ICU Day #: NUMBER: 1    Intubation Date: N/A    Ventilator Day #: N/A    Central Line Insertion Date: N/A        Day #: N/A     Arterial Line Insertion Date: N/A      Day #: N/A    Temporary Hemodialysis Catheter Insertion Date: N/A      Day # N/A    DVT Prophylaxis: none    GI Prophylaxis: none    Bey Catheter Insertion Date: n/a       Day #: n/a      Continued need (if yes, reason documented and discussed with physician): n/a    Skin Issues/ Wounds and ordered treatment discussed on rounds: no issues    Goals/ Plans for the Day: Daily labs, DKA protocol, serial BMP checks, up ad jeremi

## 2021-09-17 NOTE — CONSULTS
ENDOCRINOLOGY INITIAL CONSULTATION NOTE       Date of admission: 9/16/2021  Date of service: 9/17/2021  Admitting physician: Matt Dunham DO   Primary Care Physician: Drake Mora DO  Consultant physician: Noemi Dukes MD     Reason for the consultation:  DM type 1 admitted with DKA     History of Present Illness:  Services were provided through a video synchronous discussion     Abdulaziz John is a 23 y.o. old female with PMH of DM type 1 admitted to [facility] on 9/16/2021 because ofN/V, abdomina/ pain, and malaise and found to be in DKA, endocrine team was consulted for diabetes management. The patient told me that she had an issue with getting insulin through her insurance for the past week. She didn't contact our office regarding this issue     In the ED was found she had glucose of 673, anion gap 30, Bicarb 7,, BHB >4.5, potassium 5.2  The patient as admitted to ICU and started on insulin drip as per DKA protocol     Prior to admission  Type 1 DM was diagnosed at the age 8. Prior to admission, she was basaglar 25 units daily, Humalog with meals using carb ratio 1u:10g  + ss 1:50>150. The patient was also on insulin pump I the past.she was checking checks BS 2-3 times a day and self-blood glucose monitoring has been highly variable prior to admission. She is due for annual eye exam but denied any history of diabetic retinopathy.   The patient performs her own feet care and doesn't see podiatry service  Microvascular complications:  No Retinopathy, Nephropathy + Neuropathy   Macrovascular complications: no CAD, PVD, or Stroke    Lab Results   Component Value Date    LABA1C 14.2 09/17/2021     Inpatient diet:   Carb Restricted diet     Point of care glucose monitoring (Independently reviewed)   Recent Labs     09/17/21  1014 09/17/21  1122 09/17/21  1221 09/17/21  1320 09/17/21  1424 09/17/21  1524 09/17/21  1634 09/17/21  1736   GLUMET 217* 221* 168* 156* 129* 144* 114* 125*       Past medical history:   Past Medical History:   Diagnosis Date    Bleeding at insertion site 1/5/2018    Common femoral artery injury, right, initial encounter 1/5/2018    Depressive disorder     DM type 1 (diabetes mellitus, type 1) ([facility] [address]        Past surgical history:  Past Surgical History:   Procedure Laterality Date    ABDOMEN SURGERY N/A 5/9/2019    INCISION AND DRAINAGE OF SUPRAPUBIC ABSESS performed by Deonna Marquez MD at [facility]      last yr       Social history:   Tobacco:   reports that she has never smoked. She has never used smokeless tobacco.  Alcohol:   reports no history of alcohol use. Drugs:   reports current drug use. Drug: Marijuana. Family history:    Family History   Problem Relation Age of Onset    Diabetes Maternal Grandmother     Diabetes Paternal Grandmother     Stroke Other     Thyroid Disease Neg Hx        Allergy and drug reactions:   No Known Allergies    Scheduled Meds:   sodium chloride flush  5-40 mL IntraVENous BID     PRN Meds:   glucose, 15 g, PRN  dextrose, 12.5 g, PRN  glucagon (rDNA), 1 mg, PRN  dextrose, 100 mL/hr, PRN  dextrose, 12.5 g, PRN  potassium chloride, 10 mEq, PRN  magnesium sulfate, 1,000 mg, PRN  sodium phosphate IVPB, 10 mmol, PRN   Or  sodium phosphate IVPB, 15 mmol, PRN   Or  sodium phosphate IVPB, 20 mmol, PRN  dextrose 5% and 0.45% NaCl with KCl 20 mEq, , Continuous PRN  morphine, 2 mg, Q3H PRN  sodium chloride flush, 5-40 mL, PRN  benzocaine-menthol, 1 lozenge, Q1H PRN      Continuous Infusions:   dextrose      insulin 2.02 Units/hr (09/17/21 1811)    sodium chloride Stopped (09/17/21 0831)    dextrose 5% and 0.45% NaCl with KCl 20 mEq 150 mL/hr at 09/17/21 1445       Review of Systems  All systems reviewed.  All negative except for symptoms mentioned in HPI     OBJECTIVE    /80   Pulse 103   Temp 98.5 °F (36.9 °C) (Oral)   Resp 10   Ht 5' 1\" (1.549 m)   Wt 97 lb 10.6 oz (44.3 kg)   SpO2 99%   BMI 18.45 kg/m²     Intake/Output Summary (Last 24 hours) at 9/17/2021 1819  Last data filed at 9/17/2021 1546  Gross per 24 hour   Intake 3515.23 ml   Output 450 ml   Net 3065.23 ml       Physical examination:  General: awake alert, oriented x3, no abnormal position or movements. HEENT: normocephalic non traumatic, no exophthalmos   Neck: supple, no LN enlargement, no thyromegaly, no thyroid tenderness, no JVD. Pulm: Clear equal air entry no added sounds, no wheezing or rhonchi    CVS: S1 + S2, no murmur, no heave. Dorsalis pedis pulse palpable   Abd: soft lax, mild tenderness, no organomegaly, audible bowel sounds. Skin: warm, no lesions, no rash. no Ulcers, no open wounds   Neuro: CN intact, muscle power normal  Psych: normal mood, and affect    Review of Laboratory Data:  I personally reviewed the following labs:   Recent Labs     09/17/21  0023 09/17/21  0940   WBC 13.2* 21.0*   RBC 5.09 4.11   HGB 14.2 11.4*   HCT 43.4 34.8   MCV 85.3 84.7   MCH 27.9 27.7   MCHC 32.7 32.8   RDW 13.1 13.2    327   MPV 11.5 10.6     Recent Labs     09/17/21  0023 09/17/21  0244 09/17/21  0530 09/17/21  0940 09/17/21  1420   *   < > 133 137 135   K 6.2*   < > 5.3* 4.7 4.3   CL 87*   < > 99 109* 112*   CO2 7*   < > 3* 6* 14*   BUN 18   < > 19 16 14   CREATININE 0.7   < > 0.7 0.6 0.6   GLUCOSE 673*   < > 575* 242* 161*   CALCIUM 11.3*   < > 9.6 8.9 8.7   PROT 8.6*  --   --   --   --    LABALBU 4.3  --   --   --   --    BILITOT 0.6  --   --   --   --    ALKPHOS 100  --   --   --   --    AST 49*  --   --   --   --    ALT 26  --   --   --   --     < > = values in this interval not displayed.      Beta-Hydroxybutyrate   Date Value Ref Range Status   09/17/2021 >4.50 (H) 0.02 - 0.27 mmol/L Final   09/13/2021 2.23 (H) 0.02 - 0.27 mmol/L Final   07/25/2021 >4.50 (H) 0.02 - 0.27 mmol/L Final     Lab Results   Component Value Date    LABA1C 14.2 09/17/2021    LABA1C 15.1 07/26/2021    LABA1C 11.2 10/18/2020     No results found for: TSH, T4FREE, Z4QTLPD, FT3, L2SDKKD, TSI, TPOABS, THGAB  Lab Results   Component Value Date    LABA1C 14.2 09/17/2021    GLUCOSE 161 09/17/2021    MALBCR 53.2 08/24/2021    LABMICR 81.9 08/24/2021    LABCREA 154 08/24/2021     Lab Results   Component Value Date    TRIG 106 01/24/2018    HDL 40 01/24/2018    LDLCALC 114 01/24/2018    CHOL 175 01/24/2018       Blood culture   Lab Results   Component Value Date    BC 5 Days no growth 02/24/2021    BC 5 Days no growth 11/10/2020       Radiology:  XR CHEST PORTABLE   Final Result   No acute abnormality identified. Medical Records/Labs/Images review:   I personally reviewed and summarized previous records   All labs and imaging were reviewed independently     Bob Espinoza, a 23 y.o.-old female seen today for inpatient diabetes management     Brittle type 1 DM admitted with DKA   · Patient's DM is profoundly uncontrolled due to poor compliance with insulin therapy and diet  · Currently on insulin drip as per DKA protocol   · Once DKA resolve and pt ready for transition to sq insulin we recommend following transition regimen   · Change Lantus 20 units daily   · Humalog 6 units with meals   · Medium dose sliding scale     · Glucose check before meals and at bed time   · Will titrate insulin dose based on blood glucose trend & insulin requirement  · Pt will follow with us after discharge, Endocrine follow up visit, Wednesday 9/29 at 8:30AM     Recurrent DKA due to medications non-compliance   · I have discussed with her the great importance of following the treatment plan exactly as directed in order to achieve a good medical outcome. · Pt understand that she always need insulin. She was instructed to stop at the office anytime to  free insulin      The above issues were reviewed with the patient who understood and agreed with the plan. Thank you for allowing us to participate in the care of this patient.  Please do not hesitate to contact us with any additional questions. Katie Story MD  Endocrinologist, [facility] Diabetes Care and Endocrinology   [address] [facility] [address]   Phone: [phone]  Fax: [phone]  --------------------------------------------  An electronic signature was used to authenticate this note.  Crow Carter MD on 9/17/2021 at 6:19 PM

## 2021-09-17 NOTE — PROGRESS NOTES
Dr. Skyler Ahuja notified that patient's blood sugar out of glucometer  Range, stated\"HI\"  See [facility] for orders

## 2021-09-17 NOTE — PROGRESS NOTES
Patient admitted today morning when the night physician, came in DKA. Patient resting in ICU, does wake up easily, does say she is hungry. Wants to eat. As per patient she had a refill, however insurance did not approve her insulin in turn she ended up in hospital.  Does say now it has gone through. Remains in ICU under critical care management. No billing provided for today service yes patient was seen in the morning by the night physician.

## 2021-09-17 NOTE — H&P
[facility] History and Physical        Chief Complaint:  dka  History of Present Illness   The patient is a 23 y.o. female    BS running high  exac of abd and back pain- this is chronic recurrent  Feels like might be in DKA but hyperventilating again  +moderatedly severe nausea and vomiting recurrence severe over past 20 hours. Exacerbated by eating -- antiemetics and NOT eating alleviate. Noted that she did get NEW script ? approved for Dillon Krause and picked this up yesterday (she would ave 10 units daily taking and sliding scale)- she claims she didn't miss these doses prior to coming to ER? She has been taking levimir 30 daily per her report --but is adamant she like basaglar better  Currently basaglar NOT approved see below  -- weird sensation with levemir    Seen in ER 3 days ago by our service:  \"23year-old female with history of diabetes mellitus type 1 following with endocrinology. She was recently switched to an alternative formulation of her insulin, Basaglar was switched to Levemir and NovoLog  to lispro. States that she believes she started to feel different with this change. Just describes as a funny feeling. ... Yesterday morning her blood sugar was 380. She did not take Levemir because of that sensation. Last night her blood sugar was reported as high on her meter therefore presented to ED for further evaluation. Found to have a blood sugar of 900. Sodium 160. She was given fluids and insulin with improvement of her blood sugar down to 400. Jose Greene Discussed with patient, she is agreeable to continue taking Levemir despite the abnormal sensation she has until she can get insurance approval for [employer]. Blood sugar and sodium improved with 10 units regular insulin in the ED.   Administer 25 units of Lantus her home dose in the ED and okay to discharge home  She was instructed to contact her endocrinologist's NP for further management\"      - hx taken from the   REVIEW OF SYSTEMS:  no fevers, chills, cp, sob, n/v, ha, vision/hearing changes, wt changes, hot/cold flashes, other open skin lesions, diarrhea, constipation, dysuria/hematuria unless noted in HPI. Complete ROS performed with the patient and is otherwise negative. Past Medical History:      Diagnosis Date    Bleeding at insertion site 1/5/2018    Common femoral artery injury, right, initial encounter 1/5/2018    Depressive disorder     DM type 1 (diabetes mellitus, type 1) ([facility] [address]        Past Surgical History:        Procedure Laterality Date    ABDOMEN SURGERY N/A 5/9/2019    INCISION AND DRAINAGE OF SUPRAPUBIC ABSESS performed by Vaishali Oliveros MD at [address]      last yr       Home Medications:  Prior to Admission medications    Medication Sig Start Date End Date Taking? Authorizing Provider   insulin glargine (LANTUS SOLOSTAR) 100 UNIT/ML injection pen Inject 25 Units into the skin every morning 9/9/21  Yes MIKE Llanos - CNS   insulin aspart (NOVOLOG FLEXPEN) 100 UNIT/ML injection pen 1 unit for every 8 grams of carbs, plus ISS. Max dose 50 units 8/24/21  Yes Young Alvares APRN - CNS   Insulin Pen Needle (BD PEN NEEDLE SHELBIE U/F) 32G X 4 MM MISC Uses with insulin 4 times a day 3/17/21  Yes Janie Thompson MD   NOVOLOG FLEXPEN 100 UNIT/ML injection pen PER SLIDING SCALE. MAX OF 60 UNITS DAILY 1/26/21  Yes Historical Provider, MD       Allergies:  Patient has no known allergies. Social History:   TOBACCO:   reports that she has never smoked. She has never used smokeless tobacco.  ETOH:   reports no history of alcohol use.     Family History:       Problem Relation Age of Onset    Diabetes Maternal Grandmother     Diabetes Paternal Grandmother     Stroke Other     Thyroid Disease Neg Hx       Or deferred/otherwise considered non contributory to current admission  PHYSICAL EXAM:    VS: /85   Pulse 130   Temp 97.3 °F (36.3 °C) (Oral)   Resp 16   Ht 5' 1\" (1.549 m)   Wt 97 lb 10.6 oz (44.3 kg)   SpO2 100%   BMI 18.45 kg/m²     General Appearance:     +mild nausea +abd acute distress. Psych:  HEENT:    A.O. As per HPI details  NC/AT, PERRL, no pallor no icterus, lips/ext mucous membrane grossly dry    Neck:   Supple, trachea midline, no obvious JVD   Resp:     Dec bS, inc RR noted, No wheezes, No rhonchi   Chest wall:    No tenderness or deformity   Heart:    Tachy Regular rate and rhythm, S1 and S2 normal, no rub or gallop. Abdomen:     Soft, mild tender-tender, bowel sounds active    no suspicious obvious masses/organomegaly   Genitalia & Rectal:    Deferred.    Extremities x4:   Extremitiesatraumatic, no cyanosis, no clubbing   Musculoskeletal:      NO active synovitis or swollen b/l wrists, 2-5 MCPs examined   Skin:   Skin color, texture, turgor dry   no ACUTE rashes or lesions in lower legs and arms examined   Lymph nodes:   Cervical nodes grossly normal   Neurologic:  .not tested     LABS:  CBC:   Lab Results   Component Value Date    WBC 13.2 09/17/2021    RBC 5.09 09/17/2021    HGB 14.2 09/17/2021    HCT 43.4 09/17/2021     09/17/2021    MCV 85.3 09/17/2021     BMP:    Lab Results   Component Value Date     09/17/2021    K 5.2 09/17/2021    K 6.2 09/17/2021    CL 87 09/17/2021    CO2 7 09/17/2021    BUN 18 09/17/2021    CREATININE 0.7 09/17/2021    GLUCOSE 673 09/17/2021    CALCIUM 11.3 09/17/2021     Hepatic Function Panel:    Lab Results   Component Value Date    ALKPHOS 100 09/17/2021    AST 49 09/17/2021    ALT 26 09/17/2021    PROT 8.6 09/17/2021    LABALBU 4.3 09/17/2021    BILITOT 0.6 09/17/2021     Magnesium:    Lab Results   Component Value Date    MG 2.2 07/26/2021       PT/INR:    Lab Results   Component Value Date    PROTIME 10.7 12/14/2020    INR 0.9 12/14/2020     U/A:   Lab Results   Component Value Date    NITRITE negative 04/17/2018    LEUKOCYTESUR Negative 09/17/2021    PHUR 5.5 09/17/2021    WBCUA >20 09/13/2021 RBCUA 2-5 09/13/2021    BACTERIA FEW 09/13/2021    SPECGRAV 1.025 09/17/2021    BLOODU Negative 09/17/2021    GLUCOSEU >=1000 09/17/2021     ABG:  No results found for: PHART, ZHZ2GCT, PO2ART, X3GCCXGM, ZFA4FQS, BEART  TSH:  No results found for: TSH  Cardiac Enzymes:   Lab Results   Component Value Date    CKTOTAL 99 10/18/2020    CKTOTAL 277 (H) 10/09/2020    CKTOTAL 282 (H) 10/08/2020    CKMB <1.0 10/18/2020    TROPONINI <0.01 12/14/2020    TROPONINI <0.01 11/10/2020    TROPONINI <0.01 10/18/2020       Radiology: No results found. EKG:  Rate: 126  Rhythm: Sinus  Interpretation: Sinus tachycardia, normal DE interval, normal QRS, normal QT interval, no acute ST or T wave changes, nonspecific T wave abnormalities do not appear significantly changed  Comparison: stable as compared to patient's most recent EKG   Assessment & Plan   ACTIVE hospital problems being addressed/reassessed for this admission:  Principal Problem:    DKA, type 1, not at goal [facility])  Active Problems:    Diabetes mellitus type 1, uncontrolled ([address]    Personal history of noncompliance with medical treatment, presenting hazards to health    Severe dehydration  Resolved Problems:    * No resolved hospital problems. *    Code status/DVT prophylaxis and PLAN --see orders   Note extensive time spent coordinating care between ER docs, ER and floor nurses, and transitioning care over to day providers  Plan of care/ clinical impressions/communication specifics detailed below:      Type 1 DM ave 40 units total insulin daily, dec BMI +chronic N/V/D and back pain  DKA with a glucose of 673, anion gap of 30, bicarb of 7, and sodium of 124. Potassium was 6.2 but hemolyzed. Insulin drip  micu   in DKA but hyperventilating again    AG+  And K+ high will give insulin drip   DKA protocol, IV fluids,electrolytes etc.. Noted that she did get NEW script ? approved for [employer] and picked this up yesterday (she would ave 10 units daily taking and sliding scale)- she claims she didn't miss these doses prior to coming to ER? She has been taking levimir 30 daily per her report --but is adamant she like basaglar better  Currently basaglar NOT approved see below  -- weird sensation with levemir      3 days ago in ER +pyuria  (now nitrie LE neg)  >20 WBC - no urine culture sent (hx of candida +trich last year)  Seen in ER 3 days ago by our service:  \". ., Basaglar was switched to Levemir and NovoLog  to lispro. States that she believes she started to feel different with this change. Just describes as a funny feeling. ... Yesterday morning her blood sugar was 380. She did not take Levemir because of that sensation. Jolie Cheney Discussed with patient, she is agreeable to continue taking Levemir despite the abnormal sensation she has until she can get insurance approval for Basaglar\"        Dee Chapman MD   Night Officer, overnight admitting doctor at [facility] call day time doctor   for questions after 7:30am    Covering for [address] [employer]  If Qs please call [phone]  Electronically signed by Tommy Wise MD on 9/17/2021 at 6:31 AM

## 2021-09-17 NOTE — FLOWSHEET NOTE
Patient admitted from ED to 207, with the following belongings 2 purses, jacket, shoes, cellphone and , placed on monitor, patient oriented to room and unit visiting hours. Patient guide at bedside, reviewed patient rights and responsibilities. MRSA nasal swab obtained. Bed alarm on. Call light within reach. Carley Hall RN  9/17/2021

## 2021-09-17 NOTE — ED PROVIDER NOTES
HPI:  9/17/21, Time: 2:29 AM EDT         Mary Muñoz is a 23 y.o. female presenting to the ED for nausea and vomiting beginning today. Symptoms have been moderate in severity and intermittent. No exacerbating or alleviating factors. Symptoms of difficulty breathing as well as low back pain across her lower back. She has had no chest pain, cough, loss of sense of taste or smell, or known exposures to COVID-19. No dysuria, vaginal discharge, or vaginal bleeding. Patient is an insulin-dependent diabetic. States that she is on sliding scale insulin at home. Reports medication compliance. She is concerned that she is in DKA. Review of Systems:   Pertinent positives and negatives are stated within HPI, all other systems reviewed and are negative.          --------------------------------------------- PAST HISTORY ---------------------------------------------  Past Medical History:  has a past medical history of Bleeding at insertion site, Common femoral artery injury, right, initial encounter, Depressive disorder, and DM type 1 (di[facility] mellitus, [facility] 1) ([facility] Past Surgical History:  has a past surgical history that includes Abdomen surgery (N/A, 5/9/2019) and Bartholin gland cyst excision. Social History:  reports that she has never smoked. She has never used smokeless tobacco. She reports current drug use. Drug: Marijuana. She reports that she does not drink alcohol. Family History: family history includes Diabetes in her maternal grandmother and paternal grandmother; Stroke in an other family member. The patients home medications have been reviewed. Allergies: Patient has no known allergies.     -------------------------------------------------- RESULTS -------------------------------------------------  All laboratory and radiology results have been personally reviewed by myself   LABS:  Results for orders placed or performed during the hospital encounter of 09/16/21   COVID-19, Rapid    Specimen: Nasopharyngeal Swab   Result Value Ref Range    SARS-CoV-2, NAAT Not Detected Not Detected   CBC Auto Differential   Result Value Ref Range    WBC 13.2 (H) 4.5 - 11.5 E9/L    RBC 5.09 3.50 - 5.50 E12/L    Hemoglobin 14.2 11.5 - 15.5 g/dL    Hematocrit 43.4 34.0 - 48.0 %    MCV 85.3 80.0 - 99.9 fL    MCH 27.9 26.0 - 35.0 pg    MCHC 32.7 32.0 - 34.5 %    RDW 13.1 11.5 - 15.0 fL    Platelets 478 213 - 658 E9/L    MPV 11.5 7.0 - 12.0 fL    Neutrophils % 69.0 43.0 - 80.0 %    Immature Granulocytes % 0.3 0.0 - 5.0 %    Lymphocytes % 26.5 20.0 - 42.0 %    Monocytes % 3.6 2.0 - 12.0 %    Eosinophils % 0.1 0.0 - 6.0 %    Basophils % 0.5 0.0 - 2.0 %    Neutrophils Absolute 9.13 (H) 1.80 - 7.30 E9/L    Immature Granulocytes # 0.04 E9/L    Lymphocytes Absolute 3.50 1.50 - 4.00 E9/L    Monocytes Absolute 0.47 0.10 - 0.95 E9/L    Eosinophils Absolute 0.01 (L) 0.05 - 0.50 E9/L    Basophils Absolute 0.06 0.00 - 0.20 E9/L   Comprehensive Metabolic Panel w/ Reflex to MG   Result Value Ref Range    Sodium 124 (L) 132 - 146 mmol/L    Potassium reflex Magnesium 6.2 (H) 3.5 - 5.0 mmol/L    Chloride 87 (L) 98 - 107 mmol/L    CO2 7 (LL) 22 - 29 mmol/L    Anion Gap 30 (H) 7 - 16 mmol/L    Glucose 673 (HH) 74 - 99 mg/dL    BUN 18 6 - 20 mg/dL    CREATININE 0.7 0.5 - 1.0 mg/dL    GFR Non-African American >60 >=60 mL/min/1.73    GFR African American >60     Calcium 11.3 (H) 8.6 - 10.2 mg/dL    Total Protein 8.6 (H) 6.4 - 8.3 g/dL    Albumin 4.3 3.5 - 5.2 g/dL    Total Bilirubin 0.6 0.0 - 1.2 mg/dL    Alkaline Phosphatase 100 35 - 104 U/L    ALT 26 0 - 32 U/L    AST 49 (H) 0 - 31 U/L   Pregnancy, urine   Result Value Ref Range    HCG(Urine) Pregnancy Test NEGATIVE NEGATIVE   Beta-Hydroxybutyrate   Result Value Ref Range    Beta-Hydroxybutyrate >4.50 (H) 0.02 - 0.27 mmol/L   Urinalysis, reflex to microscopic   Result Value Ref Range    Color, UA Yellow Straw/Yellow    Clarity, UA Clear Clear    Glucose, Ur >=1000 (A) Negative mg/dL    Bilirubin Urine Negative Negative    Ketones, Urine >=80 (A) Negative mg/dL    Specific Gravity, UA 1.025 1.005 - 1.030    Blood, Urine Negative Negative    pH, UA 5.5 5.0 - 9.0    Protein, UA Negative Negative mg/dL    Urobilinogen, Urine 0.2 <2.0 E.U./dL    Nitrite, Urine Negative Negative    Leukocyte Esterase, Urine Negative Negative   Potassium   Result Value Ref Range    Potassium 5.2 (H) 3.5 - 5.0 mmol/L   POCT Glucose   Result Value Ref Range    Meter Glucose >500 (H) 74 - 99 mg/dL   POCT Glucose   Result Value Ref Range    Meter Glucose >500 (H) 74 - 99 mg/dL   POCT Glucose   Result Value Ref Range    Meter Glucose >500 (H) 74 - 99 mg/dL   EKG 12 Lead   Result Value Ref Range    Ventricular Rate 126 BPM    Atrial Rate 126 BPM    P-R Interval 142 ms    QRS Duration 70 ms    Q-T Interval 320 ms    QTc Calculation (Bazett) 463 ms    P Axis 83 degrees    R Axis 66 degrees    T Axis 60 degrees       RADIOLOGY:  Interpreted by Radiologist.  XR CHEST PORTABLE    (Results Pending)       ------------------------- NURSING NOTES AND VITALS REVIEWED ---------------------------   The nursing notes within the ED encounter and vital signs as below have been reviewed. /85   Pulse 130   Temp 97.3 °F (36.3 °C) (Oral)   Resp 16   Ht 5' 1\" (1.549 m)   Wt 97 lb 10.6 oz (44.3 kg)   SpO2 100%   BMI 18.45 kg/m²   Oxygen Saturation Interpretation: Normal      ---------------------------------------------------PHYSICAL EXAM--------------------------------------      Constitutional/General: Alert and oriented x3, appears ill, non toxic in NAD  Head: Normocephalic and atraumatic  Eyes: PERRL, EOMI  Mouth: Oropharynx clear, handling secretions, no trismus  Neck: Supple, full ROM,   Pulmonary: Lungs clear to auscultation bilaterally, no wheezes, rales, or rhonchi. Not in respiratory distress  Cardiovascular:  Regular rhythm, tachycardic, no murmurs, gallops, or rubs.  2+ distal pulses  Abdomen: Soft, non distended, suprapubic and epigastric tenderness, no rebound, no guarding. Extremities: Moves all extremities x 4. Warm and well perfused  Skin: warm and dry without rash  Neurologic: GCS 15, no focal motor or sensory deficits   Psych: Normal Affect.  Behavior normal.      ------------------------------ ED COURSE/MEDICAL DECISION MAKING----------------------  Medications   glucose (GLUTOSE) 40 % oral gel 15 g (has no administration in time range)   dextrose 50 % IV solution (has no administration in time range)   glucagon (rDNA) injection 1 mg (has no administration in time range)   dextrose 5 % solution (has no administration in time range)   insulin regular (HUMULIN R;NOVOLIN R) 100 Units in sodium chloride 0.9 % 100 mL infusion (5.04 Units/hr IntraVENous New Bag 9/17/21 0402)   dextrose 50 % IV solution (has no administration in time range)   potassium chloride 10 mEq/100 mL IVPB (Peripheral Line) (has no administration in time range)   magnesium sulfate 1000 mg in dextrose 5% 100 mL IVPB (has no administration in time range)   sodium phosphate 10 mmol in dextrose 5 % 250 mL IVPB (has no administration in time range)     Or   sodium phosphate 15 mmol in dextrose 5 % 250 mL IVPB (has no administration in time range)     Or   sodium phosphate 20 mmol in dextrose 5 % 500 mL IVPB (has no administration in time range)   0.9 % sodium chloride infusion (has no administration in time range)   dextrose 5 % and 0.45 % NaCl with KCl 20 mEq infusion (has no administration in time range)   benzocaine-menthol (CEPACOL SORE THROAT) lozenge 1 lozenge (has no administration in time range)   0.9 % sodium chloride bolus (1,000 mLs IntraVENous New Bag 9/17/21 0047)   0.9 % sodium chloride bolus (0 mLs IntraVENous Stopped 9/17/21 0301)   famotidine (PEPCID) injection 20 mg (20 mg IntraVENous Given 9/17/21 0052)   ondansetron (ZOFRAN) injection 4 mg (4 mg IntraVENous Given 9/17/21 0052)   ketorolac (TORADOL) injection 15 mg (15 mg IntraVENous Given 9/17/21 0154)   fentaNYL (SUBLIMAZE) injection 25 mcg (25 mcg IntraVENous Given 9/17/21 0245)       Medical Decision Making/ED COURSE:   Patient is a 53-year-old female with history of insulin-dependent diabetes presenting with nausea and vomiting. In the ED, patient was tachycardic but otherwise hemodynamically stable and afebrile. She was saturating 100% on room air. Patient was placed on the cardiac monitor. I interpreted findings. Rhythm - sinus. Labs and CXR obtained. Patient administered 2 L of IVF. I reviewed and interpreted labs. Patient was found to be in DKA with a glucose of 673, anion gap of 30, bicarb of 7, and sodium of 124. Potassium was 6.2 but hemolyzed. Insulin drip has been ordered but nursing was advised to hold insulin drip until repeat nonhemolyzed potassium was obtained. Potassium was 5.2, so insulin drip was initiated. Patient admitted to the ICU for further treatment. Patient remained hemodynamically stable throughout ED course. ED Course as of Sep 17 0633   Fri Sep 17, 2021   0157 EKG: This EKG is signed and interpreted by me. Rate: 126  Rhythm: Sinus  Interpretation: Sinus tachycardia, normal MO interval, normal QRS, normal QT interval, no acute ST or T wave changes, nonspecific T wave abnormalities do not appear significantly changed  Comparison: stable as compared to patient's most recent EKG        [JA]   0222 Hospitalist was consulted. Dr. Vana Galeazzi accepted the patient for admission under Dr. Christine Thompson. [JA]   [id number] ICU consulted. Dr. Barbie Renteria accepted the patient for admission. [JA]   0328 Potassium 5.2. Okay to start insulin drip. [JA]      ED Course User Index  [JA] Nika Tadeo MD       Critical Care:  Please note that the withdrawal or failure to initiate urgent interventions for this patient would likely result in a life threatening deterioration or permanent disability.       Accordingly this patient received 38 minutes of critical care time, excluding separately billable procedures. Counseling: The emergency provider has spoken with the patient and discussed todays results, in addition to providing specific details for the plan of care and counseling regarding the diagnosis and prognosis. Questions are answered at this time and they are agreeable with the plan.      --------------------------------- IMPRESSION AND DISPOSITION ---------------------------------    IMPRESSION  1. Diabetic ketoacidosis without coma associated with type 1 diabetes mellitus ([facility]        DISPOSITION  Disposition: Admit to CCU/ICU  Patient condition is stable      NOTE: This report was transcribed using voice recognition software.  Every effort was made to ensure accuracy; however, inadvertent computerized transcription errors may be present    IWong MD, am the primary provider of this record       Wong Diallo MD  09/17/21 7536

## 2021-09-17 NOTE — CONSULTS
[address] [address]                                  CONSULTATION    PATIENT NAME: Frederic Babinski                    :        1995  MED REC NO:   [id number]                            ROOM:       0207  ACCOUNT NO:   [de-identified]                           ADMIT DATE: 2021  PROVIDER:     Yinka Villanueva MD    CONSULT DATE:  2021    CRITICAL CARE CONSULTATION    REASON FOR CONSULTATION:  DKA. IMPRESSION:  1. Vomiting. 2.  DKA with severely acidotic condition with a bicarb of only 3. PLAN:  For now, she is on insulin infusion and normal saline at 250 mL  an hour. Her last blood sugar was 313. HISTORY OF PRESENT ILLNESS:  This 62-year-old -American female  with hard-to-control type 1 diabetes presented with elevated blood  sugars for which she had been to the ER a couple of days before and had  a low venous pH. She reports vomiting, but no additional symptoms  except for maybe some dry mouth and increased urination. No chest pain,  cough, shortness of breath, diarrhea, leg swelling, or foot ulcers were  noted on review. She was found to be profoundly acidotic with a serum  bicarb of 3 and started on IV fluids and insulin infusion. She is quite  hungry, but is otherwise feeling better at this point. PAST MEDICAL HISTORY:  Just includes diabetes. She denies her reported  history of depression. PAST SURGICAL HISTORY:  Abdominal surgery for I and D of a suprapubic  abscess and Bartholin's cyst extraction. FAMILY HISTORY:  There is some diabetes in her grandparents. SOCIAL HISTORY:  Nonsmoker. Nondrinker. Works as a .    ALLERGIES:  None. REVIEW OF SYSTEMS:  No fevers. No visual or hearing complaints. No  chest pain. No cough. Positive vomiting. Positive frequent urination. Review of systems is otherwise negative.     CURRENT MEDICATIONS:  Cepacol spray, insulin drip, potassium  supplements, and sodium phosphate supplements. PHYSICAL EXAMINATION:  GENERAL:  She is sleepy, but in no acute distress. VITAL SIGNS:  Temperature 36.3, pulse 119, respiratory rate was 23,  blood pressure 128/81. SKIN:  Had no rashes. HEENT:  Eyes had clear sclerae. Teeth and palate were normal.  Mucous  membranes were a little dry, not terribly so. NECK:  Without masses or adenopathy. CHEST:  Symmetric. There was no accessory muscle use. HEART:  Tachycardic, but regular. LUNGS:  Clear. ABDOMEN:  Soft and nontender without masses or organomegaly. EXTREMITIES:  Showed no clubbing, no cyanosis, and no lymphedema. NEUROLOGICAL:  She was sleepy, but appropriate. IMAGING DATA:  EKG was reviewed and showed sinus tach with some  nonspecific T-wave changes. Chest x-ray was reviewed and was clear. LABORATORY DATA:  White count was 13.2, hemoglobin was 14.2, platelets  136,191. Chemistry:  Sodium 133, potassium 5.3, chloride 99, bicarb was  only 3, BUN was 19, creatinine 0.7, glucose was reported to be 575. Greater than 30 minutes of critical care time was spent.         Loraine Levy MD    D: 09/17/2021 8:15:52       T: 09/17/2021 8:18:20     [email or web address]  Job#: [id number]     Doc#: [id number]    CC:

## 2021-09-17 NOTE — PLAN OF CARE
Problem: Pain:  Description: Pain management should include both nonpharmacologic and pharmacologic interventions.   Goal: Pain level will decrease  Description: Pain level will decrease  Outcome: Met This Shift  Goal: Control of acute pain  Description: Control of acute pain  Outcome: Met This Shift  Goal: Control of chronic pain  Description: Control of chronic pain  Outcome: Met This Shift     Problem: Fluid Volume - Imbalance:  Goal: Will remain free of signs and symptoms of dehydration  Description: Will remain free of signs and symptoms of dehydration  Outcome: Met This Shift  Goal: Absence of imbalanced fluid volume signs and symptoms  Description: Absence of imbalanced fluid volume signs and symptoms  Outcome: Met This Shift     Problem: Serum Glucose Level - Abnormal:  Goal: Ability to maintain appropriate glucose levels will improve  Description: Ability to maintain appropriate glucose levels will improve  Outcome: Met This Shift     Problem: Injury - Acid Base Imbalance:  Goal: Acid-base balance  Description: Acid-base balance  Outcome: Met This Shift

## 2021-09-17 NOTE — PLAN OF CARE
Problem: Pain:  Goal: Pain level will decrease  Description: Pain level will decrease  9/17/2021 1828 by Tg Merlos RN  Outcome: Met This Shift  9/17/2021 0617 by Pura Calle RN  Outcome: Met This Shift  Goal: Control of acute pain  Description: Control of acute pain  9/17/2021 1828 by Tg Merlos RN  Outcome: Met This Shift  9/17/2021 0617 by Pura Calle RN  Outcome: Met This Shift  Goal: Control of chronic pain  Description: Control of chronic pain  9/17/2021 1828 by Tg Merlos RN  Outcome: Met This Shift  9/17/2021 0617 by Pura Calle RN  Outcome: Met This Shift     Problem: Discharge Planning:  Goal: Discharged to appropriate level of care  Description: Discharged to appropriate level of care  9/17/2021 0617 by Pura Calle RN  Outcome: Not Met This Shift     Problem: Fluid Volume - Imbalance:  Goal: Will remain free of signs and symptoms of dehydration  Description: Will remain free of signs and symptoms of dehydration  9/17/2021 1828 by Tg Merlos RN  Outcome: Met This Shift  9/17/2021 0617 by Pura Calle RN  Outcome: Met This Shift  Goal: Absence of imbalanced fluid volume signs and symptoms  Description: Absence of imbalanced fluid volume signs and symptoms  9/17/2021 1828 by Tg Merlos RN  Outcome: Met This Shift  9/17/2021 0617 by Pura Calle RN  Outcome: Met This Shift

## 2021-09-18 VITALS
BODY MASS INDEX: 18.75 KG/M2 | SYSTOLIC BLOOD PRESSURE: 106 MMHG | TEMPERATURE: 97.7 F | WEIGHT: 99.3 LBS | HEART RATE: 111 BPM | HEIGHT: 61 IN | RESPIRATION RATE: 18 BRPM | OXYGEN SATURATION: 100 % | DIASTOLIC BLOOD PRESSURE: 62 MMHG

## 2021-09-18 LAB
ANION GAP SERPL CALCULATED.3IONS-SCNC: 12 MMOL/L (ref 7–16)
BASOPHILS ABSOLUTE: 0.04 E9/L (ref 0–0.2)
BASOPHILS RELATIVE PERCENT: 0.3 % (ref 0–2)
BUN BLDV-MCNC: 10 MG/DL (ref 6–20)
CALCIUM SERPL-MCNC: 8.5 MG/DL (ref 8.6–10.2)
CHLORIDE BLD-SCNC: 108 MMOL/L (ref 98–107)
CO2: 14 MMOL/L (ref 22–29)
CREAT SERPL-MCNC: 0.7 MG/DL (ref 0.5–1)
EOSINOPHILS ABSOLUTE: 0.06 E9/L (ref 0.05–0.5)
EOSINOPHILS RELATIVE PERCENT: 0.5 % (ref 0–6)
GFR AFRICAN AMERICAN: >60
GFR NON-AFRICAN AMERICAN: >60 ML/MIN/1.73
GLUCOSE BLD-MCNC: 248 MG/DL (ref 74–99)
HCT VFR BLD CALC: 31.3 % (ref 34–48)
HEMOGLOBIN: 10.4 G/DL (ref 11.5–15.5)
IMMATURE GRANULOCYTES #: 0.05 E9/L
IMMATURE GRANULOCYTES %: 0.4 % (ref 0–5)
LYMPHOCYTES ABSOLUTE: 3.13 E9/L (ref 1.5–4)
LYMPHOCYTES RELATIVE PERCENT: 24.9 % (ref 20–42)
MAGNESIUM: 1.6 MG/DL (ref 1.6–2.6)
MCH RBC QN AUTO: 27.7 PG (ref 26–35)
MCHC RBC AUTO-ENTMCNC: 33.2 % (ref 32–34.5)
MCV RBC AUTO: 83.5 FL (ref 80–99.9)
METER GLUCOSE: 156 MG/DL (ref 74–99)
METER GLUCOSE: 166 MG/DL (ref 74–99)
METER GLUCOSE: 74 MG/DL (ref 74–99)
MONOCYTES ABSOLUTE: 0.78 E9/L (ref 0.1–0.95)
MONOCYTES RELATIVE PERCENT: 6.2 % (ref 2–12)
MRSA CULTURE ONLY: NORMAL
NEUTROPHILS ABSOLUTE: 8.52 E9/L (ref 1.8–7.3)
NEUTROPHILS RELATIVE PERCENT: 67.7 % (ref 43–80)
PDW BLD-RTO: 13.4 FL (ref 11.5–15)
PHOSPHORUS: 3 MG/DL (ref 2.5–4.5)
PLATELET # BLD: 283 E9/L (ref 130–450)
PMV BLD AUTO: 10.6 FL (ref 7–12)
POTASSIUM SERPL-SCNC: 4.2 MMOL/L (ref 3.5–5)
RBC # BLD: 3.75 E12/L (ref 3.5–5.5)
SODIUM BLD-SCNC: 134 MMOL/L (ref 132–146)
WBC # BLD: 12.6 E9/L (ref 4.5–11.5)

## 2021-09-18 PROCEDURE — 2580000003 HC RX 258: Performed by: STUDENT IN AN ORGANIZED HEALTH CARE EDUCATION/TRAINING PROGRAM

## 2021-09-18 PROCEDURE — 99232 SBSQ HOSP IP/OBS MODERATE 35: CPT | Performed by: INTERNAL MEDICINE

## 2021-09-18 PROCEDURE — 6370000000 HC RX 637 (ALT 250 FOR IP): Performed by: INTERNAL MEDICINE

## 2021-09-18 PROCEDURE — 99239 HOSP IP/OBS DSCHRG MGMT >30: CPT | Performed by: INTERNAL MEDICINE

## 2021-09-18 PROCEDURE — 80048 BASIC METABOLIC PNL TOTAL CA: CPT

## 2021-09-18 PROCEDURE — 36415 COLL VENOUS BLD VENIPUNCTURE: CPT

## 2021-09-18 PROCEDURE — 82962 GLUCOSE BLOOD TEST: CPT

## 2021-09-18 PROCEDURE — 85025 COMPLETE CBC W/AUTO DIFF WBC: CPT

## 2021-09-18 PROCEDURE — 84100 ASSAY OF PHOSPHORUS: CPT

## 2021-09-18 PROCEDURE — 6370000000 HC RX 637 (ALT 250 FOR IP): Performed by: STUDENT IN AN ORGANIZED HEALTH CARE EDUCATION/TRAINING PROGRAM

## 2021-09-18 PROCEDURE — 6360000002 HC RX W HCPCS: Performed by: INTERNAL MEDICINE

## 2021-09-18 PROCEDURE — 83735 ASSAY OF MAGNESIUM: CPT

## 2021-09-18 RX ORDER — INSULIN ASPART 100 [IU]/ML
INJECTION, SOLUTION INTRAVENOUS; SUBCUTANEOUS
Qty: 5 PEN | Refills: 3 | Status: SHIPPED | OUTPATIENT
Start: 2021-09-18 | End: 2021-11-17

## 2021-09-18 RX ORDER — INSULIN GLARGINE 100 [IU]/ML
INJECTION, SOLUTION SUBCUTANEOUS
Qty: 10 PEN | Refills: 5 | Status: SHIPPED | OUTPATIENT
Start: 2021-09-18 | End: 2021-11-17

## 2021-09-18 RX ORDER — INSULIN GLARGINE 100 [IU]/ML
22 INJECTION, SOLUTION SUBCUTANEOUS NIGHTLY
Status: DISCONTINUED | OUTPATIENT
Start: 2021-09-18 | End: 2021-09-18 | Stop reason: HOSPADM

## 2021-09-18 RX ADMIN — Medication 10 ML: at 07:28

## 2021-09-18 RX ADMIN — MORPHINE SULFATE 2 MG: 2 INJECTION, SOLUTION INTRAMUSCULAR; INTRAVENOUS at 03:15

## 2021-09-18 RX ADMIN — INSULIN LISPRO 6 UNITS: 100 INJECTION, SOLUTION INTRAVENOUS; SUBCUTANEOUS at 16:30

## 2021-09-18 RX ADMIN — INSULIN LISPRO 2 UNITS: 100 INJECTION, SOLUTION INTRAVENOUS; SUBCUTANEOUS at 06:27

## 2021-09-18 RX ADMIN — INSULIN LISPRO 6 UNITS: 100 INJECTION, SOLUTION INTRAVENOUS; SUBCUTANEOUS at 11:38

## 2021-09-18 RX ADMIN — INSULIN LISPRO 6 UNITS: 100 INJECTION, SOLUTION INTRAVENOUS; SUBCUTANEOUS at 06:33

## 2021-09-18 RX ADMIN — INSULIN LISPRO 2 UNITS: 100 INJECTION, SOLUTION INTRAVENOUS; SUBCUTANEOUS at 16:30

## 2021-09-18 RX ADMIN — MORPHINE SULFATE 2 MG: 2 INJECTION, SOLUTION INTRAMUSCULAR; INTRAVENOUS at 06:29

## 2021-09-18 ASSESSMENT — PAIN DESCRIPTION - LOCATION: LOCATION: BACK

## 2021-09-18 ASSESSMENT — PAIN DESCRIPTION - PAIN TYPE: TYPE: ACUTE PAIN

## 2021-09-18 ASSESSMENT — PAIN SCALES - GENERAL
PAINLEVEL_OUTOF10: 8

## 2021-09-18 ASSESSMENT — PAIN DESCRIPTION - ORIENTATION: ORIENTATION: LOWER

## 2021-09-18 NOTE — DISCHARGE SUMMARY
[facility] EMERGENCY SERVICE Physician Discharge Summary       No follow-up provider specified. Activity level: As tolerated    Diet: ADULT DIET; Regular; 3 carb choices (45 gm/meal)    Labs:    Dispo: Home    Condition at discharge: Stable    Continue supplemental oxygen via nasal canula @ 2 LPM round-the-clock. Continue CPAP / BiPAP during sleep as prior to admission. Patient ID:  Abdulaziz John  [id number]  23 y.o.  1995    Admit date: 9/16/2021    Discharge date and time:  9/18/2021  10:10 AM    Admission Diagnoses: Principal Problem:    DKA, type 1, not at goal [facility])  Active Problems:    Diabetes mellitus type 1, uncontrolled ([facility]    Personal history of noncompliance with medical treatment, presenting hazards to health    Severe dehydration  Resolved Problems:    * No resolved hospital problems. *      Discharge Diagnoses: Principal Problem:    DKA, type 1, not at goal [facility])  Active Problems:    Diabetes mellitus type 1, uncontrolled ([facility]    Personal history of noncompliance with medical treatment, presenting hazards to health    Severe dehydration  Resolved Problems:    * No resolved hospital problems. *      Consults:  IP CONSULT TO CRITICAL CARE  IP CONSULT TO IV TEAM  IP CONSULT TO ENDOCRINOLOGY    Procedures: None    Hospital Course: Patient was admitted with DKA, type 1, not at goal [facility]) [E10.10]  Diabetic ketoacidosis without coma associated with type 1 diabetes mellitus ([facility] [E10.10]. Patient Admitted on 17th, came in DKA, needed insulin drip, gap closed yesterday, did receive Lantus 20 last evening, patient on 25 units at home. Patient now out of ICU, seen by endocrine, did speak to endocrine who is okay with DC plans. As of now patient is awake, alert, comfortable, once again was educated pertaining to the importance of watching her diabetes carefully. We will continue with discharging patient home.     Discharge Exam:  Vitals:    09/18/21 0000 09/18/21 0024 09/18/21 0549 09/18/21 0728   BP: 110/71 113/78  106/62   Pulse: 112 109  111   Resp: 14 16 18   Temp: 98.7 °F (37.1 °C) 98.5 °F (36.9 °C)  97.7 °F (36.5 °C)   TempSrc: Oral Oral  Oral   SpO2: 99% 99%  100%   Weight:   99 lb 4.8 oz (45 kg)    Height:           General Appearance: alert and oriented to person, place and time, well-developed and well-nourished, in no acute distress  Skin: warm and dry, no rash or erythema  Head: normocephalic and atraumatic  Eyes: pupils equal, round, and reactive to light, extraocular eye movements intact, conjunctivae normal  ENT: tympanic membrane, external ear and ear canal normal bilaterally, oropharynx clear and moist with normal mucous membranes  Neck: neck supple and non tender without mass, no thyromegaly or thyroid nodules, no cervical lymphadenopathy   Pulmonary/Chest: clear to auscultation bilaterally- no wheezes, rales or rhonchi, normal air movement, no respiratory distress  Cardiovascular: normal rate, normal S1 and S2, no gallops, intact distal pulses and no carotid bruits  Abdomen: soft, non-tender, non-distended, normal bowel sounds, no masses or organomegaly  I/O last 3 completed shifts: In: 2075.4 [I.V.:1507.7; IV Piggyback:567.7]  Out: 450 [Urine:450]  No intake/output data recorded. LABS:  Recent Labs     09/17/21  1810 09/17/21  2200 09/18/21  0300    133 134   K 5.2* 3.8 4.2   * 109* 108*   CO2 13* 15* 14*   BUN 11 10 10   CREATININE 0.6 0.6 0.7   GLUCOSE 175* 140* 248*   CALCIUM 8.3* 8.5* 8.5*       Recent Labs     09/17/21  0023 09/17/21  0940 09/18/21  0300   WBC 13.2* 21.0* 12.6*   RBC 5.09 4.11 3.75   HGB 14.2 11.4* 10.4*   HCT 43.4 34.8 31.3*   MCV 85.3 84.7 83.5   MCH 27.9 27.7 27.7   MCHC 32.7 32.8 33.2   RDW 13.1 13.2 13.4    327 283   MPV 11.5 10.6 10.6       Imaging:   XR CHEST PORTABLE   Final Result   No acute abnormality identified.              Patient Instructions:      Medication List      CHANGE how you take these medications    Basaglar KwikPen 100 UNIT/ML injection pen  Generic drug: insulin glargine  Inject 22 units nightly  What changed:   · how much to take  · how to take this  · when to take this  · additional instructions     NovoLOG FlexPen 100 UNIT/ML injection pen  Generic drug: insulin aspart  1 unit for every 10 grams of carbs, plus sliding scale -200 add 2U, -250 add 4U, -300 add 6U, -350 add 8U, -400 add 12U, BS over 400 add 12U. MAX 30U/day  What changed:   · additional instructions  · Another medication with the same name was removed. Continue taking this medication, and follow the directions you see here. CONTINUE taking these medications    BD Pen Needle Camila U/F 32G X 4 MM Misc  Generic drug: Insulin Pen Needle  Uses with insulin 4 times a day           Where to Get Your Medications      These medications were sent to [facility] [address] [facility]. Hector De Dios [phone] Bertram Mercado [phone] [address] [address]    Phone: [phone]   · Basaglar KwikPen 100 UNIT/ML injection pen  · NovoLOG FlexPen 100 UNIT/ML injection pen           Note that more than 30 minutes was spent in preparing discharge papers, discussing discharge with patient, medication review, etc.    Signed:  Electronically signed by Randolph Morfin MD on 9/18/2021 at 10:10 AM    NOTE: This report was transcribed using voice recognition software. Every effort was made to ensure accuracy; however, inadvertent computerized transcription errors may be present.

## 2021-09-18 NOTE — ED PROVIDER NOTES
Department of Emergency Medicine   ED  Provider Note  Admit Date/RoomTime: 9/16/2021 11:03 PM  ED Room: [address] [place] [address]A          History of Present Illness:  9/18/21, Time: 11:11 AM EDT  Chief Complaint   Patient presents with    Hyperglycemia     Hx of type 1DM, \"Just anastasiya, don't know the NO. \" has been using sliding scale insulin, does not work well    Heartburn     started today     Back Pain    Nausea    Emesis       Murali Duggan is a 23 y.o. female presenting to the ED for high glucose. The patient has past medical history of diabetes mellitus type 1 that is insulin-dependent. Her sugars have read \"high\" at home. She states that since this morning she has been feeling nausea and vomiting. She is had 2 episodes of emesis which he describes as nonbloody and nonbilious. No abdominal pain. She states that she feels she is in DKA as this is what she typically feels like. She denies any fevers. She has not had a cough. She does feel somewhat short of breath that is worse with emesis. She has a burning sensation in her chest which increases with emesis as well. She is experiencing low back pain. It is a sharp sensation that is always present when she is in DKA. She denies any trauma, numbness, tingling, weakness, incontinence or IV drug use. No dysuria or hematuria. The patient endorses compliance with her insulin. She is on long-acting as well as NovoLog. She has used 8 units of NovoLog earlier today.      Review of Systems:   Constitutional symptoms: Denies fever  Eyes: Denies eye pain  Ears, Nose, Mouth, Throat: Denies ear pain  Cardiovascular: Positive for chest burning  Respiratory: Positive for shortness of breath and cough  Gastrointestinal: Denies blood per rectum  Genitourinary: Denies hematuria  Skin: Denies rashes  Neurological: Denies headache  Musculoskeletal: Positive for back pain    --------------------------------------------- PAST HISTORY ---------------------------------------------  Past Medical History:  has a past medical history of Bleeding at insertion site, Common femoral artery injury, right, initial encounter, Depressive disorder, and DM type 1 (diabetes mellitus, type 1) ([facility] [address] Past Surgical History:  has a past surgical history that includes Abdomen surgery (N/A, 5/9/2019) and Bartholin gland cyst excision. Social History:  reports that she has never smoked. She has never used smokeless tobacco. She reports current drug use. Drug: Marijuana. She reports that she does not drink alcohol. Family History: family history includes Diabetes in her maternal grandmother and paternal grandmother; Stroke in an other family member. . Unless otherwise noted, family history is non contributory    The patients home medications have been reviewed. Allergies: Patient has no known allergies. I have reviewed the past medical history, past surgical history, social history, and family history    ---------------------------------------------------PHYSICAL EXAM--------------------------------------    General: The patient is conversational and lying comfortably in bed. Head: Normocephalic and atraumatic. Eyes: Sclera non-icteric and no conjunctival injection  ENT: Nasal and oral membranes appear dry  Neck: Trachea midline. No JVD  Respiratory: Lungs clear to auscultation bilaterally. Patient speaking in full sentences. Cardiovascular: Tachycardic. No pedal edema. Gastrointestinal:  Abdomen is soft and nondistended. Bowel sounds present. There is no tenderness. There is no guarding or rebound. Musculoskeletal: No deformity. No tenderness to the midline spine. No step-offs or deformities. Diffuse paraspinal moderate muscle is in the lumbar region. Patient able flex and extend at the hips and knees. Skin: Skin warm and dry. No rashes. Neurologic: No gross motor deficits. No aphasia.   Psychiatric: Normal affect.    -------------------------------------------------- RESULTS -------------------------------------------------  I have personally reviewed all laboratory and imaging results for this patient. Results are listed below.      LABS: (Lab results interpreted by me)  Results for orders placed or performed during the hospital encounter of 09/16/21   COVID-19, Rapid    Specimen: Nasopharyngeal Swab   Result Value Ref Range    SARS-CoV-2, NAAT Not Detected Not Detected   CBC Auto Differential   Result Value Ref Range    WBC 13.2 (H) 4.5 - 11.5 E9/L    RBC 5.09 3.50 - 5.50 E12/L    Hemoglobin 14.2 11.5 - 15.5 g/dL    Hematocrit 43.4 34.0 - 48.0 %    MCV 85.3 80.0 - 99.9 fL    MCH 27.9 26.0 - 35.0 pg    MCHC 32.7 32.0 - 34.5 %    RDW 13.1 11.5 - 15.0 fL    Platelets 539 294 - 672 E9/L    MPV 11.5 7.0 - 12.0 fL    Neutrophils % 69.0 43.0 - 80.0 %    Immature Granulocytes % 0.3 0.0 - 5.0 %    Lymphocytes % 26.5 20.0 - 42.0 %    Monocytes % 3.6 2.0 - 12.0 %    Eosinophils % 0.1 0.0 - 6.0 %    Basophils % 0.5 0.0 - 2.0 %    Neutrophils Absolute 9.13 (H) 1.80 - 7.30 E9/L    Immature Granulocytes # 0.04 E9/L    Lymphocytes Absolute 3.50 1.50 - 4.00 E9/L    Monocytes Absolute 0.47 0.10 - 0.95 E9/L    Eosinophils Absolute 0.01 (L) 0.05 - 0.50 E9/L    Basophils Absolute 0.06 0.00 - 0.20 E9/L   Comprehensive Metabolic Panel w/ Reflex to MG   Result Value Ref Range    Sodium 124 (L) 132 - 146 mmol/L    Potassium reflex Magnesium 6.2 (H) 3.5 - 5.0 mmol/L    Chloride 87 (L) 98 - 107 mmol/L    CO2 7 (LL) 22 - 29 mmol/L    Anion Gap 30 (H) 7 - 16 mmol/L    Glucose 673 (HH) 74 - 99 mg/dL    BUN 18 6 - 20 mg/dL    CREATININE 0.7 0.5 - 1.0 mg/dL    GFR Non-African American >60 >=60 mL/min/1.73    GFR African American >60     Calcium 11.3 (H) 8.6 - 10.2 mg/dL    Total Protein 8.6 (H) 6.4 - 8.3 g/dL    Albumin 4.3 3.5 - 5.2 g/dL    Total Bilirubin 0.6 0.0 - 1.2 mg/dL    Alkaline Phosphatase 100 35 - 104 U/L    ALT 26 0 - 32 U/L    AST 49 (H) 0 - 31 U/L   Pregnancy, urine   Result Value Ref Range HCG(Urine) Pregnancy Test NEGATIVE NEGATIVE   Beta-Hydroxybutyrate   Result Value Ref Range    Beta-Hydroxybutyrate >4.50 (H) 0.02 - 0.27 mmol/L   Urinalysis, reflex to microscopic   Result Value Ref Range    Color, UA Yellow Straw/Yellow    Clarity, UA Clear Clear    Glucose, Ur >=1000 (A) Negative mg/dL    Bilirubin Urine Negative Negative    Ketones, Urine >=80 (A) Negative mg/dL    Specific Gravity, UA 1.025 1.005 - 1.030    Blood, Urine Negative Negative    pH, UA 5.5 5.0 - 9.0    Protein, UA Negative Negative mg/dL    Urobilinogen, Urine 0.2 <2.0 E.U./dL    Nitrite, Urine Negative Negative    Leukocyte Esterase, Urine Negative Negative   Potassium   Result Value Ref Range    Potassium 5.2 (H) 3.5 - 5.0 mmol/L   Magnesium   Result Value Ref Range    Magnesium 2.1 1.6 - 2.6 mg/dL   Basic metabolic panel   Result Value Ref Range    Sodium 133 132 - 146 mmol/L    Potassium 5.3 (H) 3.5 - 5.0 mmol/L    Chloride 99 98 - 107 mmol/L    CO2 3 (LL) 22 - 29 mmol/L    Anion Gap 31 (H) 7 - 16 mmol/L    Glucose 575 (HH) 74 - 99 mg/dL    BUN 19 6 - 20 mg/dL    CREATININE 0.7 0.5 - 1.0 mg/dL    GFR Non-African American >60 >=60 mL/min/1.73    GFR African American >60     Calcium 9.6 8.6 - 10.2 mg/dL   Phosphorus   Result Value Ref Range    Phosphorus 5.6 (H) 2.5 - 4.5 mg/dL   Basic Metabolic Panel   Result Value Ref Range    Sodium 137 132 - 146 mmol/L    Potassium 4.7 3.5 - 5.0 mmol/L    Chloride 109 (H) 98 - 107 mmol/L    CO2 6 (LL) 22 - 29 mmol/L    Anion Gap 22 (H) 7 - 16 mmol/L    Glucose 242 (H) 74 - 99 mg/dL    BUN 16 6 - 20 mg/dL    CREATININE 0.6 0.5 - 1.0 mg/dL    GFR Non-African American >60 >=60 mL/min/1.73    GFR African American >60     Calcium 8.9 8.6 - 10.2 mg/dL   Basic Metabolic Panel   Result Value Ref Range    Sodium 135 132 - 146 mmol/L    Potassium 4.3 3.5 - 5.0 mmol/L    Chloride 112 (H) 98 - 107 mmol/L    CO2 14 (L) 22 - 29 mmol/L    Anion Gap 9 7 - 16 mmol/L    Glucose 161 (H) 74 - 99 mg/dL    BUN 14 6 - 20 mg/dL    CREATININE 0.6 0.5 - 1.0 mg/dL    GFR Non-African American >60 >=60 mL/min/1.73    GFR African American >60     Calcium 8.7 8.6 - 10.2 mg/dL   Magnesium   Result Value Ref Range    Magnesium 1.9 1.6 - 2.6 mg/dL   Magnesium   Result Value Ref Range    Magnesium 1.8 1.6 - 2.6 mg/dL   Phosphorus   Result Value Ref Range    Phosphorus 3.4 2.5 - 4.5 mg/dL   Phosphorus   Result Value Ref Range    Phosphorus 2.2 (L) 2.5 - 4.5 mg/dL   CBC WITH AUTO DIFFERENTIAL   Result Value Ref Range    WBC 21.0 (H) 4.5 - 11.5 E9/L    RBC 4.11 3.50 - 5.50 E12/L    Hemoglobin 11.4 (L) 11.5 - 15.5 g/dL    Hematocrit 34.8 34.0 - 48.0 %    MCV 84.7 80.0 - 99.9 fL    MCH 27.7 26.0 - 35.0 pg    MCHC 32.8 32.0 - 34.5 %    RDW 13.2 11.5 - 15.0 fL    Platelets 518 663 - 767 E9/L    MPV 10.6 7.0 - 12.0 fL    Neutrophils % 70.0 43.0 - 80.0 %    Immature Granulocytes % 0.7 0.0 - 5.0 %    Lymphocytes % 24.6 20.0 - 42.0 %    Monocytes % 4.4 2.0 - 12.0 %    Eosinophils % 0.0 0.0 - 6.0 %    Basophils % 0.3 0.0 - 2.0 %    Neutrophils Absolute 14.69 (H) 1.80 - 7.30 E9/L    Immature Granulocytes # 0.15 E9/L    Lymphocytes Absolute 5.16 (H) 1.50 - 4.00 E9/L    Monocytes Absolute 0.93 0.10 - 0.95 E9/L    Eosinophils Absolute 0.01 (L) 0.05 - 0.50 E9/L    Basophils Absolute 0.07 0.00 - 0.20 V3/O   Basic Metabolic Panel   Result Value Ref Range    Sodium 133 132 - 146 mmol/L    Potassium 5.2 (H) 3.5 - 5.0 mmol/L    Chloride 109 (H) 98 - 107 mmol/L    CO2 13 (L) 22 - 29 mmol/L    Anion Gap 11 7 - 16 mmol/L    Glucose 175 (H) 74 - 99 mg/dL    BUN 11 6 - 20 mg/dL    CREATININE 0.6 0.5 - 1.0 mg/dL    GFR Non-African American >60 >=60 mL/min/1.73    GFR African American >60     Calcium 8.3 (L) 8.6 - 10.2 mg/dL   Magnesium   Result Value Ref Range    Magnesium 1.8 1.6 - 2.6 mg/dL   Phosphorus   Result Value Ref Range    Phosphorus 3.9 2.5 - 4.5 mg/dL   Hemoglobin A1c   Result Value Ref Range    Hemoglobin A1C 14.2 (H) 4.0 - 5.6 %   PH, VENOUS   Result Value Ref Range    pH, Christoph 7.14 (LL) 7.35 - 7.45   CBC WITH AUTO DIFFERENTIAL   Result Value Ref Range    WBC 12.6 (H) 4.5 - 11.5 E9/L    RBC 3.75 3.50 - 5.50 E12/L    Hemoglobin 10.4 (L) 11.5 - 15.5 g/dL    Hematocrit 31.3 (L) 34.0 - 48.0 %    MCV 83.5 80.0 - 99.9 fL    MCH 27.7 26.0 - 35.0 pg    MCHC 33.2 32.0 - 34.5 %    RDW 13.4 11.5 - 15.0 fL    Platelets 892 472 - 345 E9/L    MPV 10.6 7.0 - 12.0 fL    Neutrophils % 67.7 43.0 - 80.0 %    Immature Granulocytes % 0.4 0.0 - 5.0 %    Lymphocytes % 24.9 20.0 - 42.0 %    Monocytes % 6.2 2.0 - 12.0 %    Eosinophils % 0.5 0.0 - 6.0 %    Basophils % 0.3 0.0 - 2.0 %    Neutrophils Absolute 8.52 (H) 1.80 - 7.30 E9/L    Immature Granulocytes # 0.05 E9/L    Lymphocytes Absolute 3.13 1.50 - 4.00 E9/L    Monocytes Absolute 0.78 0.10 - 0.95 E9/L    Eosinophils Absolute 0.06 0.05 - 0.50 E9/L    Basophils Absolute 0.04 0.00 - 0.20 U9/N   Basic Metabolic Panel   Result Value Ref Range    Sodium 134 132 - 146 mmol/L    Potassium 4.2 3.5 - 5.0 mmol/L    Chloride 108 (H) 98 - 107 mmol/L    CO2 14 (L) 22 - 29 mmol/L    Anion Gap 12 7 - 16 mmol/L    Glucose 248 (H) 74 - 99 mg/dL    BUN 10 6 - 20 mg/dL    CREATININE 0.7 0.5 - 1.0 mg/dL    GFR Non-African American >60 >=60 mL/min/1.73    GFR African American >60     Calcium 8.5 (L) 8.6 - 10.2 mg/dL   Magnesium   Result Value Ref Range    Magnesium 1.6 1.6 - 2.6 mg/dL   Phosphorus   Result Value Ref Range    Phosphorus 3.0 2.5 - 4.5 mg/dL   Basic metabolic panel   Result Value Ref Range    Sodium 133 132 - 146 mmol/L    Potassium 3.8 3.5 - 5.0 mmol/L    Chloride 109 (H) 98 - 107 mmol/L    CO2 15 (L) 22 - 29 mmol/L    Anion Gap 9 7 - 16 mmol/L    Glucose 140 (H) 74 - 99 mg/dL    BUN 10 6 - 20 mg/dL    CREATININE 0.6 0.5 - 1.0 mg/dL    GFR Non-African American >60 >=60 mL/min/1.73    GFR African American >60     Calcium 8.5 (L) 8.6 - 10.2 mg/dL   Magnesium   Result Value Ref Range    Magnesium 1.5 (L) 1.6 - 2.6 mg/dL   Phosphorus   Result Value Ref Range    Phosphorus 3.1 2.5 - 4.5 mg/dL   POCT Glucose   Result Value Ref Range    Meter Glucose >500 (H) 74 - 99 mg/dL   POCT Glucose   Result Value Ref Range    Meter Glucose >500 (H) 74 - 99 mg/dL   POCT Glucose   Result Value Ref Range    Meter Glucose >500 (H) 74 - 99 mg/dL   POCT Glucose   Result Value Ref Range    Meter Glucose 313 (H) 74 - 99 mg/dL   POCT Glucose   Result Value Ref Range    Meter Glucose 219 (H) 74 - 99 mg/dL   POCT Glucose   Result Value Ref Range    Meter Glucose >500 (H) 74 - 99 mg/dL   POCT Glucose   Result Value Ref Range    Meter Glucose 247 (H) 74 - 99 mg/dL   POCT Glucose   Result Value Ref Range    Meter Glucose 217 (H) 74 - 99 mg/dL   POCT Glucose   Result Value Ref Range    Meter Glucose 221 (H) 74 - 99 mg/dL   POCT Glucose   Result Value Ref Range    Meter Glucose 168 (H) 74 - 99 mg/dL   POCT Glucose   Result Value Ref Range    Meter Glucose 156 (H) 74 - 99 mg/dL   POCT Glucose   Result Value Ref Range    Meter Glucose 129 (H) 74 - 99 mg/dL   POCT Glucose   Result Value Ref Range    Meter Glucose 144 (H) 74 - 99 mg/dL   POCT Glucose   Result Value Ref Range    Meter Glucose 114 (H) 74 - 99 mg/dL   POCT Glucose   Result Value Ref Range    Meter Glucose 125 (H) 74 - 99 mg/dL   POCT Glucose   Result Value Ref Range    Meter Glucose 161 (H) 74 - 99 mg/dL   POCT Glucose   Result Value Ref Range    Meter Glucose 165 (H) 74 - 99 mg/dL   POCT Glucose   Result Value Ref Range    Meter Glucose 195 (H) 74 - 99 mg/dL   POCT Glucose   Result Value Ref Range    Meter Glucose 134 (H) 74 - 99 mg/dL   POCT Glucose   Result Value Ref Range    Meter Glucose 166 (H) 74 - 99 mg/dL   POCT Glucose   Result Value Ref Range    Meter Glucose 74 74 - 99 mg/dL   EKG 12 Lead   Result Value Ref Range    Ventricular Rate 126 BPM    Atrial Rate 126 BPM    P-R Interval 142 ms    QRS Duration 70 ms    Q-T Interval 320 ms    QTc Calculation (Bazett) 463 ms    P Axis 83 degrees    R Axis 66 degrees    T Axis 60 degrees   ,     RADIOLOGY:  Interpreted by Radiologist unless otherwise specified  XR CHEST PORTABLE   Final Result   No acute abnormality identified.             ------------------------- NURSING NOTES AND VITALS REVIEWED ---------------------------   The nursing notes within the ED encounter and vital signs as below have been reviewed by myself  /62 Comment: manual  Pulse 111   Temp 97.7 °F (36.5 °C) (Oral)   Resp 18   Ht 5' 1\" (1.549 m)   Wt 99 lb 4.8 oz (45 kg)   SpO2 100%   BMI 18.76 kg/m²     Oxygen Saturation Interpretation: Normal    The patients available past medical records and past encounters were reviewed.       ------------------------------ ED COURSE/MEDICAL DECISION MAKING----------------------  Medications   glucose (GLUTOSE) 40 % oral gel 15 g (has no administration in time range)   dextrose 50 % IV solution (has no administration in time range)   glucagon (rDNA) injection 1 mg (has no administration in time range)   dextrose 5 % solution (has no administration in time range)   dextrose 50 % IV solution (has no administration in time range)   morphine (PF) injection 2 mg (2 mg IntraVENous Given 9/18/21 0629)   sodium chloride flush 0.9 % injection 5-40 mL (10 mLs IntraVENous Given 9/18/21 0728)   sodium chloride flush 0.9 % injection 5-40 mL (10 mLs IntraVENous Given 9/17/21 1441)   benzocaine-menthol (CEPACOL SORE THROAT) lozenge 1 lozenge (1 lozenge Oral Given 9/17/21 1446)   insulin lispro (HUMALOG) injection vial 0-12 Units (0 Units SubCUTAneous Not Given 9/18/21 1104)   insulin lispro (HUMALOG) injection vial 0-6 Units (1 Units SubCUTAneous Given 9/17/21 2004)   insulin lispro (HUMALOG) injection vial 6 Units (6 Units SubCUTAneous Given 9/18/21 0633)   insulin glargine (LANTUS) injection vial 22 Units (has no administration in time range)   0.9 % sodium chloride bolus (1,000 mLs IntraVENous New Bag 9/17/21 0047)   0.9 % sodium chloride bolus (0 mLs IntraVENous Stopped 9/17/21 0301)   famotidine (PEPCID) injection 20 mg (20 mg IntraVENous Given 9/17/21 0052)   ondansetron (ZOFRAN) injection 4 mg (4 mg IntraVENous Given 9/17/21 0052)   ketorolac (TORADOL) injection 15 mg (15 mg IntraVENous Given 9/17/21 0154)   fentaNYL (SUBLIMAZE) injection 25 mcg (25 mcg IntraVENous Given 9/17/21 0245)       Medical Decision Making: This is a 23 y.o. female presenting to the ED for elevated blood glucose. On initial presentation, the patient's vital signs are interpreted as Normotensive, tachycardic, afebrile and saturating well on room air. Based on history and physical exam, we have a broad differential. I have a high suspicion for diabetic ketoacidosis at this time. Point-of-care glucose will be obtained. We cannot exclude hyperglycemia without anion gap. We also considered infectious process such as pneumonia or urinary tract infection. The patient denies any abdominal pain and has a soft abdomen that is nonsurgical. She is experiencing back pain which may be musculoskeletal. No history of trauma and she is neurovascularly intact. She is of childbearing age and we cannot exclude pregnancy. At this time will obtain EKG, chest x-ray, laboratory studies. The patient will be given 2 L of normal saline for hydration. Point of care glucose is >500. Pregnancy test is negative. The patient will be given Toradol for pain control. At this time, my shift has ended. The patient will be signed out pending work-up and reevaluation. She will be signed out to the oncoming physician, Dr. Aaliyah Olivares. This patient's ED course included: a personal history and physicial examination    This patient has remained unchanged during their ED course. Consultations:  None    The cardiac monitor revealed sinus tachycardia with a heart rate in the 100s as interpreted by me. The cardiac monitor was ordered secondary to the patient's chest burning and to monitor the patient for dysrhythmia.    CPT 84531    Oxygen Saturation Interpretation: 100 % on room air. Normal    Counseling:   I have spoken with the patient and discussed todays results, in addition to providing specific details for the plan of care and counseling regarding the diagnosis and prognosis. Questions are answered at this time and they are agreeable with the plan.     --------------------------------- IMPRESSION AND DISPOSITION ---------------------------------    IMPRESSION  1. Diabetic ketoacidosis without coma associated with type 1 diabetes mellitus ([facility] [address]        DISPOSITION  Disposition: Pending work up  Patient condition is fair    IDr. Deb, am the primary provider of record    NOTE: This report was transcribed using voice recognition software.  Every effort was made to ensure accuracy; however, inadvertent computerized transcription errors may be present        Deb Skelton MD  09/18/21 6634

## 2021-09-18 NOTE — PROGRESS NOTES
Pharmacy called, insurance will not cover basaglar but will cover lantus. Dr. White File to check if ok for lantus.

## 2021-09-18 NOTE — PROGRESS NOTES
Report given to Mini Ferguson on 6W. Tele monitor to be sent to unit and this nurse will transport patient via wheelchair to room 613. Patient aware of transfer order and in agreeable.

## 2021-09-19 LAB — URINE CULTURE, ROUTINE: NORMAL

## 2021-09-20 ENCOUNTER — TELEPHONE (OUTPATIENT)
Dept: PRIMARY CARE CLINIC | Age: 26
End: 2021-09-20

## 2021-09-20 NOTE — TELEPHONE ENCOUNTER
[facility] Transitions Initial Follow Up Call    Outreach made within 2 business days of discharge: Yes    Patient: Michael Schmidt Patient : 1995   MRN: [id number]  Reason for Admission: There are no discharge diagnoses documented for the most recent discharge. Discharge Date: 21       Spoke with: Laverne Benito    [facility] Patient Contact:  Was patient able to fill all prescriptions: Yes  Was patient instructed to bring all medications to the follow-up visit: Yes  Is patient taking all medications as directed in the discharge summary?  Yes  Does patient understand their discharge instructions: Yes  Does patient have questions or concerns that need addressed prior to 7-14 day follow up office visit: no    Scheduled appointment with PCP within 7-14 days    Follow Up  Future Appointments   Date Time Provider Joleen Silver   2021  3:30 PM DO Rosa Abrams [facility]   2021  8:30 AM MIKE Garcia - CNS [facility]   10/5/2021  8:00 AM DO Rosa Abrams [facility]   [facility]       Jeffrey Benavides MA

## 2021-09-28 ENCOUNTER — VIRTUAL VISIT (OUTPATIENT)
Dept: PRIMARY CARE CLINIC | Age: 26
End: 2021-09-28
Payer: COMMERCIAL

## 2021-09-28 ENCOUNTER — VIRTUAL VISIT (OUTPATIENT)
Dept: ENDOCRINOLOGY | Age: 26
End: 2021-09-28
Payer: COMMERCIAL

## 2021-09-28 DIAGNOSIS — E10.65 UNCONTROLLED TYPE 1 DIABETES MELLITUS WITH HYPERGLYCEMIA (HCC): Primary | ICD-10-CM

## 2021-09-28 DIAGNOSIS — E55.9 VITAMIN D DEFICIENCY: ICD-10-CM

## 2021-09-28 DIAGNOSIS — E10.65 TYPE 1 DIABETES MELLITUS WITH HYPERGLYCEMIA (HCC): ICD-10-CM

## 2021-09-28 DIAGNOSIS — E16.2 HYPOGLYCEMIA: ICD-10-CM

## 2021-09-28 DIAGNOSIS — Z91.119 DIETARY NONCOMPLIANCE: Primary | ICD-10-CM

## 2021-09-28 PROCEDURE — 99214 OFFICE O/P EST MOD 30 MIN: CPT | Performed by: INTERNAL MEDICINE

## 2021-09-28 PROCEDURE — G8427 DOCREV CUR MEDS BY ELIG CLIN: HCPCS | Performed by: INTERNAL MEDICINE

## 2021-09-28 PROCEDURE — 3046F HEMOGLOBIN A1C LEVEL >9.0%: CPT | Performed by: INTERNAL MEDICINE

## 2021-09-28 PROCEDURE — 2022F DILAT RTA XM EVC RTNOPTHY: CPT | Performed by: INTERNAL MEDICINE

## 2021-09-28 PROCEDURE — 1111F DSCHRG MED/CURRENT MED MERGE: CPT | Performed by: INTERNAL MEDICINE

## 2021-09-28 PROCEDURE — 1036F TOBACCO NON-USER: CPT | Performed by: INTERNAL MEDICINE

## 2021-09-28 PROCEDURE — G8420 CALC BMI NORM PARAMETERS: HCPCS | Performed by: INTERNAL MEDICINE

## 2021-09-28 SDOH — ECONOMIC STABILITY: FOOD INSECURITY: WITHIN THE PAST 12 MONTHS, THE FOOD YOU BOUGHT JUST DIDN'T LAST AND YOU DIDN'T HAVE MONEY TO GET MORE.: NEVER TRUE

## 2021-09-28 SDOH — ECONOMIC STABILITY: FOOD INSECURITY: WITHIN THE PAST 12 MONTHS, YOU WORRIED THAT YOUR FOOD WOULD RUN OUT BEFORE YOU GOT MONEY TO BUY MORE.: NEVER TRUE

## 2021-09-28 ASSESSMENT — SOCIAL DETERMINANTS OF HEALTH (SDOH): HOW HARD IS IT FOR YOU TO PAY FOR THE VERY BASICS LIKE FOOD, HOUSING, MEDICAL CARE, AND HEATING?: NOT HARD AT ALL

## 2021-09-28 NOTE — PROGRESS NOTES
Gab Banegas was read the following message We want to confirm that, for purposes of billing, this is a virtual visit with your provider for which we will submit a claim for reimbursement with your insurance company. You will be responsible for any copays, coinsurance amounts or other amounts not covered by your insurance company. If you do not accept this, unfortunately we will not be able to schedule or proceed with a virtual visit with the provider. Do you accept? Robert Corona responded Yes .

## 2021-09-28 NOTE — PROGRESS NOTES
[address] [place] [address] Department of Endocrinology Diabetes and Metabolism   [address] [facility] [address]   Phone: [phone]  Fax: [phone]    Date of Service: 9/28/2021  Primary Care Physician: Marti Gaines DO. Provider: Yusef Morse MD    Reason for the visit:  Type 1 DM, vitD deficiency      History of Present Illness: The history is provided by the patient. No  was used. Accuracy of the patient data is excellent. Josh Harry is a very pleasant 23 y.o. female seen today for follow up visit     Josh Harry was diagnosed with diabetes at age 8   Has insulin pump at home but she doesn't like it    Currently on Lantus 22 units at night, humalog 1:10 + ss 1:50>150   The patient has been checking BS 4 times a day with meals and at bedtime. Pt rep[orted better readings recently. Lab Results   Component Value Date    LABA1C 14.2 09/17/2021    LABA1C 15.1 07/26/2021    LABA1C 11.2 10/18/2020    LABA1C 13.0 05/30/2020     Her diabetes complicated with gastroparesis   I reviewed current medications and the patient has no issues with diabetes medications  Josh Harry is due for eye exam   The patient performs her own feet care and doesn't see podiatry service   Microvascular complications: + Neuropathy. No Retinopathy, No Nephropathy   Macrovascular complications: no CAD, PVD, or Stroke  The patient receives Flushot every year. She has not received the pneumonia vaccine.     PAST MEDICAL HISTORY   Past Medical History:   Diagnosis Date    Bleeding at insertion site 1/5/2018    Common femoral artery injury, right, initial encounter 1/5/2018    Depressive disorder     DM type 1 (diabetes mellitus, type 1) ([facility] [address]      PAST SURGICAL HISTORY   Past Surgical History:   Procedure Laterality Date    ABDOMEN SURGERY N/A 5/9/2019    INCISION AND DRAINAGE OF SUPRAPUBIC ABSESS performed by Jay Jay Jones MD at [address] [place] [address] EXCISION      last yr     SOCIAL HISTORY   Tobacco:   reports that she has never smoked. She has never used smokeless tobacco.  Alcol:   reports no history of alcohol use. Illicit Drugs:   reports current drug use. Drug: Marijuana. FAMILY HISTORY   Family History   Problem Relation Age of Onset    Diabetes Maternal Grandmother     Diabetes Paternal Grandmother     Stroke Other     Thyroid Disease Neg Hx      ALLERGIES AND DRUG REACTIONS   No Known Allergies    CURRENT MEDICATIONS     Current Outpatient Medications   Medication Sig Dispense Refill    insulin aspart (NOVOLOG FLEXPEN) 100 UNIT/ML injection pen 1 unit for every 10 grams of carbs, plus sliding scale -200 add 2U, -250 add 4U, -300 add 6U, -350 add 8U, -400 add 12U, BS over 400 add 12U. MAX 30U/day 5 pen 3    insulin glargine (BASAGLAR KWIKPEN) 100 UNIT/ML injection pen Inject 22 units nightly 10 pen 5    Insulin Pen Needle (BD PEN NEEDLE SHELBIE U/F) 32G X 4 MM MISC Uses with insulin 4 times a day 300 each 5     No current facility-administered medications for this visit. Review of Systems  Constitutional: No fever, no chills, no diaphoresis, no generalized weakness. HEENT: No blurred vision, No sore throat, no ear pain, no hair loss  Neck: denied any neck swelling, difficulty swallowing,   Cardio-pulmonary: No CP, SOB or palpitation, No orthopnea or PND. No cough or wheezing. GI: No N/V/D, no constipation, No abdominal pain, no melena or hematochezia   : Denied any dysuria, hematuria, flank pain, discharge, or incontinence. Skin: denied any rash, ulcer, Hirsute, or hyperpigmentation. MSK: denied any joint deformity, joint pain/swelling, muscle pain, or back pain. Neuro: + numbness and tingling in lower extremities. OBJECTIVE    There were no vitals taken for this visit.   BP Readings from Last 4 Encounters:   09/18/21 106/62   09/13/21 132/72   08/24/21 96/67   07/26/21 109/71     Wt Readings from Last 6 Encounters:   09/18/21 99 lb 4.8 oz (45 kg)   08/24/21 102 lb (46.3 kg)   07/25/21 127 lb 10.3 oz (57.9 kg)   02/24/21 84 lb 14 oz (38.5 kg)   02/02/21 98 lb 8 oz (44.7 kg)   01/14/21 91 lb (41.3 kg)     Physical examination:  Due to this being a TeleHealth encounter, evaluation of the following organ systems is limited: Vitals/Constitutional/EENT/Resp/CV/GI//MS/Neuro/Skin/Heme-Lymph-Imm. Modified physical exam through Telemedicine camera    General: Communicating well via camera   Neck: no obvious neck mass. No obvious neck deformity     CVS: no distress   Chest: no distress. Chest is moving with respiration    Extremities:  no visible tremor  Skin: No visible rashes as seen from camera   Musculoskeletal: no visible deformity  Neuro: Alert and oriented to person, place, and time. Psychiatric: Normal mood and affect.  Behavior is normal    Review of Laboratory Data:  I have reviewed the following:  Lab Results   Component Value Date/Time    WBC 12.6 (H) 09/18/2021 03:00 AM    RBC 3.75 09/18/2021 03:00 AM    HGB 10.4 (L) 09/18/2021 03:00 AM    HCT 31.3 (L) 09/18/2021 03:00 AM    MCV 83.5 09/18/2021 03:00 AM    MCH 27.7 09/18/2021 03:00 AM    MCHC 33.2 09/18/2021 03:00 AM    RDW 13.4 09/18/2021 03:00 AM     09/18/2021 03:00 AM    MPV 10.6 09/18/2021 03:00 AM      Lab Results   Component Value Date/Time     09/18/2021 03:00 AM    K 4.2 09/18/2021 03:00 AM    K 6.2 (H) 09/17/2021 12:23 AM    CO2 14 (L) 09/18/2021 03:00 AM    BUN 10 09/18/2021 03:00 AM    CREATININE 0.7 09/18/2021 03:00 AM    CALCIUM 8.5 (L) 09/18/2021 03:00 AM    LABGLOM >60 09/18/2021 03:00 AM    GFRAA >60 09/18/2021 03:00 AM      No results found for: TSH, T4FREE, K2COYCP, FT3, Q5SAJPO, TSI, TPOABS, THGAB  Lab Results   Component Value Date    LABA1C 14.2 09/17/2021    GLUCOSE 248 09/18/2021    MALBCR 53.2 08/24/2021    LABMICR 81.9 08/24/2021    LABCREA 154 08/24/2021     Lab Results   Component Value Date    CHOL 175 01/24/2018    TRIG 106 01/24/2018    HDL 40 01/24/2018     No results found for: [facility]   Marquez Polanco, a 23 y.o.-old female seen in for the following issues     Diabetes Mellitus Type 1    · Pt's DM is uncontrolled due to poor compliance with diabetic diet and missing insulin   · Change DM regimen to: Lantus 22 units at night, humalog 1:10 + ss 2:50>150   · The patient has insulin pump at home, but not using it (she doesn't like anything attached to her body)   · Pt to bring her BG readings in few days for review   · Discussed with patient A1c and blood sugar goals   · Patient will need routine diabetes maintenance and prevention  · A1c at next OV     vitD deficiency   · Continue vitD supplement     I personally reviewed external notes from PCP and other patient's care team providers, and personally interpreted labs associated with the above diagnosis. I also ordered labs to further assess and manage the above addressed medical conditions    Return in about 4 months (around 1/28/2022) for DM type 1, VitD deficiency. The above issues were reviewed with the patient who understood and agreed with the plan. Thank you for allowing us to participate in the care of this patient. Please do not hesitate to contact us with any additional questions. Diagnosis Orders   1. Dietary noncompliance     2. Hypoglycemia     3. Vitamin D deficiency     4. Type 1 diabetes mellitus with hyperglycemia ([facility] [address]  Hemoglobin A1C     Montana Fuller MD  Endocrinologist, [facility])   [address] [place] [address], [address] [place] [address],[facility] [address] [address]   Phone: [phone]  Fax: [phone]  -------------------------   An electronic signature was used to authenticate this note.  Martine Barrera MD on 9/28/2021 at 1:09 PM  This visit was performed as a virtual video visit using a synchronous, two-way, audio-video telehealth technology platform  This Virtual  Visit was conducted, with patient's consent, to reduce the patient's risk of exposure to COVID-19 and provide continuity of care.

## 2021-09-28 NOTE — PROGRESS NOTES
Post-Discharge Transitional Care Management Services or Hospital Follow Up      Hakeem Jones   YOB: 1995    Date of Office Visit:  9/28/2021  Date of Hospital Admission: 9/16/21  Date of Hospital Discharge: 9/18/21  Readmission Risk Score(high >=14%. Medium >=10%):Readmission Risk Score: 24      Care management risk score Rising risk (score 2-5) and Complex Care (Scores >=6): 5     Non face to face  following discharge, date last encounter closed (first attempt may have been earlier): 9/20/2021  3:10 PM 9/20/2021  3:10 PM    Call initiated 2 business days of discharge: Yes     Patient Active Problem List   Diagnosis    DKA, type 1 ([address]    Diabetes mellitus type 1, uncontrolled ([address]    Personal history of noncompliance with medical treatment, presenting hazards to health    Leukocytosis    Elevated lactic acid level    Hyperglycemia    Pyelonephritis    Acidosis    Severe dehydration    Hypokalemia    Diabetic ketoacidosis without coma associated with type 1 diabetes mellitus ([address]    Hypoglycemia    Vitamin D deficiency    Uncontrolled type 1 diabetes mellitus with hyperglycemia (HCC)    Severe protein-calorie malnutrition (HCC)    Trichomoniasis    Lactic acid acidosis    DKA, type 1, not at goal [facility])    COVID-19 virus infection    Depressive disorder    Generalized abdominal pain    Nausea, vomiting and diarrhea    Hyperglycemia due to diabetes mellitus ([address]       No Known Allergies    Medications listed as ordered at the time of discharge from hospital   Johnathan [address] [place] [address] [address]10 Medication Instructions ARELI:    Printed on:09/28/21 [id number]   Medication Information                      insulin aspart (NOVOLOG FLEXPEN) 100 UNIT/ML injection pen  1 unit for every 10 grams of carbs, plus sliding scale -200 add 2U, -250 add 4U, -300 add 6U, -350 add 8U, -400 add 12U, BS over 400 add 12U.  MAX 30U/day             insulin glargine [facility]) 100 UNIT/ML injection pen  Inject 22 units nightly             Insulin Pen Needle (BD PEN NEEDLE SHELBIE U/F) 32G X 4 MM MISC  Uses with insulin 4 times a day                   Medications marked \"taking\" at this time  Outpatient Medications Marked as Taking for the 9/28/21 encounter (Virtual Visit) with Melissa Lea, DO   Medication Sig Dispense Refill    insulin aspart (NOVOLOG FLEXPEN) 100 UNIT/ML injection pen 1 unit for every 10 grams of carbs, plus sliding scale -200 add 2U, -250 add 4U, -300 add 6U, -350 add 8U, -400 add 12U, BS over 400 add 12U. MAX 30U/day 5 pen 3    insulin glargine (BASAGLAR KWIKPEN) 100 UNIT/ML injection pen Inject 22 units nightly 10 pen 5    Insulin Pen Needle (BD PEN NEEDLE SHELBIE U/F) 32G X 4 MM MISC Uses with insulin 4 times a day 300 each 5        Medications patient taking as of now reconciled against medications ordered at time of hospital discharge: Yes    Chief Complaint   Patient presents with   [address] Management     TCM visit       HPI     Inpatient course: Discharge summary reviewed- see chart. Physical exam: Alert oriented x3, no apparent distress. Ear nose and throat unremarkable. No signs of respiratory distress. Moving all 4 extremities without difficulty    Review of systems: No chest pain shortness of breath palpitations or edema. she denies any shortness of breath, cough or wheezing. She denies any nausea vomiting or diarrhea. She denies any changes in bowel movements. She does not have any abdominal pain. She  denies any urological symptoms including urinary frequency and dysuria     There were no vitals filed for this visit. There is no height or weight on file to calculate BMI.    Wt Readings from Last 3 Encounters:   09/18/21 99 lb 4.8 oz (45 kg)   08/24/21 102 lb (46.3 kg)   07/25/21 127 lb 10.3 oz (57.9 kg)     BP Readings from Last 3 Encounters:   09/18/21 106/62   09/13/21 132/72   08/24/21 96/67 Assessment/Plan:  1. Uncontrolled type 1 diabetes mellitus with hyperglycemia (HCC)  - DE DISCHARGE MEDS RECONCILED W/ CURRENT OUTPATIENT MED LIST        Medical Decision Making: moderate complexity    She was recently admitted for DKA. She was treated with an insulin drip. She was at home on 25 units of Lantus. She is followed up with endocrinology this morning. It was thought that she was poorly compliant with diet and missing insulin doses. Her diabetic regimen was adjusted. Her medications were changed which prompted her to go to the hospital. Her glucose has been better since she has been home. She is taking sliding scale and 22 U of long acting. Her dizziness has resolved. Assessment    1. Diabetes mellitus - uncontrolled  2. Nausea, resolved    Plan    1. Discussed diet  2. Advised to follow with endocrinology  3.  She was instructed to get the Covid vaccine as well as influenza vaccine

## 2021-10-05 ENCOUNTER — VIRTUAL VISIT (OUTPATIENT)
Dept: PRIMARY CARE CLINIC | Age: 26
End: 2021-10-05
Payer: COMMERCIAL

## 2021-10-05 DIAGNOSIS — E10.65 UNCONTROLLED TYPE 1 DIABETES MELLITUS WITH HYPERGLYCEMIA (HCC): Primary | ICD-10-CM

## 2021-10-05 PROCEDURE — G8484 FLU IMMUNIZE NO ADMIN: HCPCS | Performed by: INTERNAL MEDICINE

## 2021-10-05 PROCEDURE — G8420 CALC BMI NORM PARAMETERS: HCPCS | Performed by: INTERNAL MEDICINE

## 2021-10-05 PROCEDURE — G8427 DOCREV CUR MEDS BY ELIG CLIN: HCPCS | Performed by: INTERNAL MEDICINE

## 2021-10-05 PROCEDURE — 1036F TOBACCO NON-USER: CPT | Performed by: INTERNAL MEDICINE

## 2021-10-05 PROCEDURE — 3046F HEMOGLOBIN A1C LEVEL >9.0%: CPT | Performed by: INTERNAL MEDICINE

## 2021-10-05 PROCEDURE — 2022F DILAT RTA XM EVC RTNOPTHY: CPT | Performed by: INTERNAL MEDICINE

## 2021-10-05 PROCEDURE — 1111F DSCHRG MED/CURRENT MED MERGE: CPT | Performed by: INTERNAL MEDICINE

## 2021-10-05 PROCEDURE — 99213 OFFICE O/P EST LOW 20 MIN: CPT | Performed by: INTERNAL MEDICINE

## 2021-10-05 NOTE — PROGRESS NOTES
10/5/21    Ernestina Watkins, a female of 23 y.o. came to the office     Ernestina Watkins presents today for virtual visit for evaluation of her diabetes. She states that her glucose is between 150 and 200. She denies any problems with her insulin regimen. She denies any symptoms of hypoglycemia. She occasionally does have some reflux when she consumes spicy foods. She states that when she modifies her diet this improves. She has a follow-up appointment with endocrinology in December      Patient Active Problem List   Diagnosis    DKA, type 1 ([address]    Diabetes mellitus type 1, uncontrolled ([address]    Personal history of noncompliance with medical treatment, presenting hazards to health    Leukocytosis    Elevated lactic acid level    Hyperglycemia    Pyelonephritis    Acidosis    Severe dehydration    Hypokalemia    Diabetic ketoacidosis without coma associated with type 1 diabetes mellitus (HCC)    Hypoglycemia    Vitamin D deficiency    Uncontrolled type 1 diabetes mellitus with hyperglycemia (HCC)    Severe protein-calorie malnutrition (HCC)    Trichomoniasis    Lactic acid acidosis    DKA, type 1, not at goal [facility])    COVID-19 virus infection    Depressive disorder    Generalized abdominal pain    Nausea, vomiting and diarrhea    Hyperglycemia due to diabetes mellitus ([address]      No Known Allergies  Current Outpatient Medications on File Prior to Visit   Medication Sig Dispense Refill    insulin aspart (NOVOLOG FLEXPEN) 100 UNIT/ML injection pen 1 unit for every 10 grams of carbs, plus sliding scale -200 add 2U, -250 add 4U, -300 add 6U, -350 add 8U, -400 add 12U, BS over 400 add 12U.  MAX 30U/day 5 pen 3    insulin glargine (BASAGLAR KWIKPEN) 100 UNIT/ML injection pen Inject 22 units nightly 10 pen 5    Insulin Pen Needle (BD PEN NEEDLE SHELBIE U/F) 32G X 4 MM MISC Uses with insulin 4 times a day 300 each 5     No current facility-administered medications on file prior to visit. Review of Systems  Constitutional:Negative for activity change, appetite change, chills, fatigue and fever. Respiratory: Negative for choking, chest tightness, shortness of breath and wheezing. Cardiovascular: Negative for chest pain, palpitations and leg swelling. Gastrointestinal: Negative for abdominal distention, constipation, diarrhea, nausea and vomiting. Musculoskeletal: Negative for arthralgias, back pain, gait problem and joint swelling. Neurological: Negative for dizziness, weakness,numbness and headaches. There were no vitals taken for this visit. Physical Exam   Constitutional:  Oriented to person, place, and time. Appears well-developed and well-nourished. No acute distress. Neurological:Alert and oriented to person, place, and time. Skin: No diaphoresis. Psychiatric: Normal mood and affect. Behavior is Normal.     ASSESSMENT AND PLAN:    Sid Figueroa was seen today for follow-up and diabetes. Diagnoses and all orders for this visit:    Uncontrolled type 1 diabetes mellitus with hyperglycemia ([facility] [address]        Continue current insulin regimen    Advised to follow-up with endocrinology    We discussed her GERD, she should modify her diet but if her symptoms persist she may benefit from a trial of Prilosec      Return in about 3 months (around 1/5/2022). Electronically signed by Isadora Cano DO on 10/5/2021 at 9:05 AM      TeleMedicine Patient Consent    This visit was performed as a virtual video visit using a synchronous, two-way, audio-video telehealth technology platform. Patient identification was verified at the start of the visit, including the patient's telephone number and physical location. I discussed with the patient the nature of our telehealth visits, that:     1.  Due to the nature of an audio- video modality, the only components of a physical exam that could be done are the elements supported by

## 2021-11-16 ENCOUNTER — APPOINTMENT (OUTPATIENT)
Dept: GENERAL RADIOLOGY | Age: 26
DRG: 420 | End: 2021-11-16
Payer: COMMERCIAL

## 2021-11-16 ENCOUNTER — HOSPITAL ENCOUNTER (INPATIENT)
Age: 26
LOS: 2 days | Discharge: HOME | DRG: 420 | End: 2021-11-19
Attending: STUDENT IN AN ORGANIZED HEALTH CARE EDUCATION/TRAINING PROGRAM | Admitting: FAMILY MEDICINE
Payer: COMMERCIAL

## 2021-11-16 DIAGNOSIS — E10.10 DIABETIC KETOACIDOSIS WITHOUT COMA ASSOCIATED WITH TYPE 1 DIABETES MELLITUS (HCC): Primary | ICD-10-CM

## 2021-11-16 LAB
ALBUMIN SERPL-MCNC: 4.8 G/DL (ref 3.5–5.2)
ALP BLD-CCNC: 149 U/L (ref 35–104)
ALT SERPL-CCNC: 45 U/L (ref 0–32)
ANION GAP SERPL CALCULATED.3IONS-SCNC: 27 MMOL/L (ref 7–16)
AST SERPL-CCNC: 25 U/L (ref 0–31)
BACTERIA: ABNORMAL /HPF
BASOPHILS ABSOLUTE: 0.07 E9/L (ref 0–0.2)
BASOPHILS RELATIVE PERCENT: 0.5 % (ref 0–2)
BETA-HYDROXYBUTYRATE: >4.5 MMOL/L (ref 0.02–0.27)
BILIRUB SERPL-MCNC: 0.4 MG/DL (ref 0–1.2)
BILIRUBIN URINE: ABNORMAL
BLOOD, URINE: NEGATIVE
BUN BLDV-MCNC: 32 MG/DL (ref 6–20)
CALCIUM SERPL-MCNC: 10.5 MG/DL (ref 8.6–10.2)
CHLORIDE BLD-SCNC: 97 MMOL/L (ref 98–107)
CLARITY: ABNORMAL
CO2: 11 MMOL/L (ref 22–29)
COLOR: YELLOW
CREAT SERPL-MCNC: 0.9 MG/DL (ref 0.5–1)
EOSINOPHILS ABSOLUTE: 0.01 E9/L (ref 0.05–0.5)
EOSINOPHILS RELATIVE PERCENT: 0.1 % (ref 0–6)
GFR AFRICAN AMERICAN: >60
GFR NON-AFRICAN AMERICAN: >60 ML/MIN/1.73
GLUCOSE BLD-MCNC: 297 MG/DL
GLUCOSE BLD-MCNC: 309 MG/DL (ref 74–99)
GLUCOSE URINE: >=1000 MG/DL
HCG, URINE, POC: NEGATIVE
HCT VFR BLD CALC: 46.4 % (ref 34–48)
HEMOGLOBIN: 15.5 G/DL (ref 11.5–15.5)
IMMATURE GRANULOCYTES #: 0.07 E9/L
IMMATURE GRANULOCYTES %: 0.5 % (ref 0–5)
KETONES, URINE: >=80 MG/DL
LACTIC ACID: 2.7 MMOL/L (ref 0.5–2.2)
LEUKOCYTE ESTERASE, URINE: ABNORMAL
LYMPHOCYTES ABSOLUTE: 3.02 E9/L (ref 1.5–4)
LYMPHOCYTES RELATIVE PERCENT: 19.4 % (ref 20–42)
Lab: NORMAL
MAGNESIUM: 2.6 MG/DL (ref 1.6–2.6)
MCH RBC QN AUTO: 27.9 PG (ref 26–35)
MCHC RBC AUTO-ENTMCNC: 33.4 % (ref 32–34.5)
MCV RBC AUTO: 83.5 FL (ref 80–99.9)
METER GLUCOSE: 297 MG/DL (ref 74–99)
MONOCYTES ABSOLUTE: 0.87 E9/L (ref 0.1–0.95)
MONOCYTES RELATIVE PERCENT: 5.6 % (ref 2–12)
NEGATIVE QC PASS/FAIL: NORMAL
NEUTROPHILS ABSOLUTE: 11.49 E9/L (ref 1.8–7.3)
NEUTROPHILS RELATIVE PERCENT: 73.9 % (ref 43–80)
NITRITE, URINE: NEGATIVE
PDW BLD-RTO: 13.8 FL (ref 11.5–15)
PH UA: 5.5 (ref 5–9)
PH VENOUS: 7.23 (ref 7.35–7.45)
PHOSPHORUS: 5.2 MG/DL (ref 2.5–4.5)
PLATELET # BLD: 381 E9/L (ref 130–450)
PMV BLD AUTO: 11.2 FL (ref 7–12)
POSITIVE QC PASS/FAIL: NORMAL
POTASSIUM REFLEX MAGNESIUM: 4.9 MMOL/L (ref 3.5–5)
PROTEIN UA: 30 MG/DL
RBC # BLD: 5.56 E12/L (ref 3.5–5.5)
RBC UA: ABNORMAL /HPF (ref 0–2)
REASON FOR REJECTION: NORMAL
REJECTED TEST: NORMAL
SODIUM BLD-SCNC: 135 MMOL/L (ref 132–146)
SPECIFIC GRAVITY UA: 1.02 (ref 1–1.03)
TOTAL PROTEIN: 9 G/DL (ref 6.4–8.3)
TRICHOMONAS: PRESENT /HPF
TROPONIN, HIGH SENSITIVITY: <6 NG/L (ref 0–9)
UROBILINOGEN, URINE: 0.2 E.U./DL
WBC # BLD: 15.5 E9/L (ref 4.5–11.5)
WBC UA: ABNORMAL /HPF (ref 0–5)

## 2021-11-16 PROCEDURE — 82800 BLOOD PH: CPT

## 2021-11-16 PROCEDURE — 96375 TX/PRO/DX INJ NEW DRUG ADDON: CPT

## 2021-11-16 PROCEDURE — 6370000000 HC RX 637 (ALT 250 FOR IP): Performed by: STUDENT IN AN ORGANIZED HEALTH CARE EDUCATION/TRAINING PROGRAM

## 2021-11-16 PROCEDURE — 2580000003 HC RX 258: Performed by: STUDENT IN AN ORGANIZED HEALTH CARE EDUCATION/TRAINING PROGRAM

## 2021-11-16 PROCEDURE — 83735 ASSAY OF MAGNESIUM: CPT

## 2021-11-16 PROCEDURE — 84484 ASSAY OF TROPONIN QUANT: CPT

## 2021-11-16 PROCEDURE — 81001 URINALYSIS AUTO W/SCOPE: CPT

## 2021-11-16 PROCEDURE — 84100 ASSAY OF PHOSPHORUS: CPT

## 2021-11-16 PROCEDURE — 82010 KETONE BODYS QUAN: CPT

## 2021-11-16 PROCEDURE — 6360000002 HC RX W HCPCS: Performed by: STUDENT IN AN ORGANIZED HEALTH CARE EDUCATION/TRAINING PROGRAM

## 2021-11-16 PROCEDURE — 85025 COMPLETE CBC W/AUTO DIFF WBC: CPT

## 2021-11-16 PROCEDURE — 93005 ELECTROCARDIOGRAM TRACING: CPT | Performed by: NURSE PRACTITIONER

## 2021-11-16 PROCEDURE — 71045 X-RAY EXAM CHEST 1 VIEW: CPT

## 2021-11-16 PROCEDURE — 96374 THER/PROPH/DIAG INJ IV PUSH: CPT

## 2021-11-16 PROCEDURE — 80053 COMPREHEN METABOLIC PANEL: CPT

## 2021-11-16 PROCEDURE — 99283 EMERGENCY DEPT VISIT LOW MDM: CPT

## 2021-11-16 PROCEDURE — 83605 ASSAY OF LACTIC ACID: CPT

## 2021-11-16 PROCEDURE — 82962 GLUCOSE BLOOD TEST: CPT

## 2021-11-16 RX ORDER — 0.9 % SODIUM CHLORIDE 0.9 %
1000 INTRAVENOUS SOLUTION INTRAVENOUS ONCE
Status: COMPLETED | OUTPATIENT
Start: 2021-11-16 | End: 2021-11-16

## 2021-11-16 RX ORDER — DEXTROSE AND SODIUM CHLORIDE 5; .45 G/100ML; G/100ML
INJECTION, SOLUTION INTRAVENOUS CONTINUOUS PRN
Status: DISCONTINUED | OUTPATIENT
Start: 2021-11-16 | End: 2021-11-17

## 2021-11-16 RX ORDER — SODIUM CHLORIDE 450 MG/100ML
INJECTION, SOLUTION INTRAVENOUS CONTINUOUS
Status: DISCONTINUED | OUTPATIENT
Start: 2021-11-16 | End: 2021-11-17

## 2021-11-16 RX ORDER — KETOROLAC TROMETHAMINE 30 MG/ML
15 INJECTION, SOLUTION INTRAMUSCULAR; INTRAVENOUS ONCE
Status: DISCONTINUED | OUTPATIENT
Start: 2021-11-16 | End: 2021-11-16

## 2021-11-16 RX ORDER — POTASSIUM CHLORIDE 7.45 MG/ML
10 INJECTION INTRAVENOUS PRN
Status: DISCONTINUED | OUTPATIENT
Start: 2021-11-16 | End: 2021-11-17

## 2021-11-16 RX ORDER — ONDANSETRON 2 MG/ML
4 INJECTION INTRAMUSCULAR; INTRAVENOUS ONCE
Status: COMPLETED | OUTPATIENT
Start: 2021-11-16 | End: 2021-11-16

## 2021-11-16 RX ORDER — MAGNESIUM SULFATE 1 G/100ML
1000 INJECTION INTRAVENOUS PRN
Status: DISCONTINUED | OUTPATIENT
Start: 2021-11-16 | End: 2021-11-17

## 2021-11-16 RX ORDER — DEXTROSE MONOHYDRATE 25 G/50ML
12.5 INJECTION, SOLUTION INTRAVENOUS PRN
Status: DISCONTINUED | OUTPATIENT
Start: 2021-11-16 | End: 2021-11-17

## 2021-11-16 RX ORDER — KETOROLAC TROMETHAMINE 30 MG/ML
15 INJECTION, SOLUTION INTRAMUSCULAR; INTRAVENOUS ONCE
Status: COMPLETED | OUTPATIENT
Start: 2021-11-16 | End: 2021-11-16

## 2021-11-16 RX ADMIN — KETOROLAC TROMETHAMINE 15 MG: 30 INJECTION, SOLUTION INTRAMUSCULAR at 21:17

## 2021-11-16 RX ADMIN — SODIUM CHLORIDE 1000 ML: 9 INJECTION, SOLUTION INTRAVENOUS at 21:16

## 2021-11-16 RX ADMIN — INSULIN HUMAN 5 UNITS: 100 INJECTION, SOLUTION PARENTERAL at 23:55

## 2021-11-16 RX ADMIN — ONDANSETRON HYDROCHLORIDE 4 MG: 2 SOLUTION INTRAMUSCULAR; INTRAVENOUS at 21:17

## 2021-11-16 RX ADMIN — SODIUM CHLORIDE: 4.5 INJECTION, SOLUTION INTRAVENOUS at 23:55

## 2021-11-16 ASSESSMENT — PAIN DESCRIPTION - LOCATION: LOCATION: BACK

## 2021-11-16 ASSESSMENT — PAIN SCALES - GENERAL
PAINLEVEL_OUTOF10: 7
PAINLEVEL_OUTOF10: 5

## 2021-11-16 ASSESSMENT — ENCOUNTER SYMPTOMS
BACK PAIN: 1
NAUSEA: 1
COUGH: 0
SORE THROAT: 0
SHORTNESS OF BREATH: 1
VOMITING: 1
ABDOMINAL PAIN: 1
PHOTOPHOBIA: 0
DIARRHEA: 0

## 2021-11-17 LAB
ANION GAP SERPL CALCULATED.3IONS-SCNC: 11 MMOL/L (ref 7–16)
ANION GAP SERPL CALCULATED.3IONS-SCNC: 11 MMOL/L (ref 7–16)
ANION GAP SERPL CALCULATED.3IONS-SCNC: 13 MMOL/L (ref 7–16)
ANION GAP SERPL CALCULATED.3IONS-SCNC: 13 MMOL/L (ref 7–16)
ANION GAP SERPL CALCULATED.3IONS-SCNC: 16 MMOL/L (ref 7–16)
BETA-HYDROXYBUTYRATE: 1.3 MMOL/L (ref 0.02–0.27)
BUN BLDV-MCNC: 12 MG/DL (ref 6–20)
BUN BLDV-MCNC: 12 MG/DL (ref 6–20)
BUN BLDV-MCNC: 15 MG/DL (ref 6–20)
BUN BLDV-MCNC: 21 MG/DL (ref 6–20)
BUN BLDV-MCNC: 27 MG/DL (ref 6–20)
CALCIUM SERPL-MCNC: 8.3 MG/DL (ref 8.6–10.2)
CALCIUM SERPL-MCNC: 9.2 MG/DL (ref 8.6–10.2)
CALCIUM SERPL-MCNC: 9.2 MG/DL (ref 8.6–10.2)
CALCIUM SERPL-MCNC: 9.3 MG/DL (ref 8.6–10.2)
CALCIUM SERPL-MCNC: 9.4 MG/DL (ref 8.6–10.2)
CHLORIDE BLD-SCNC: 104 MMOL/L (ref 98–107)
CHLORIDE BLD-SCNC: 106 MMOL/L (ref 98–107)
CHLORIDE BLD-SCNC: 107 MMOL/L (ref 98–107)
CHLORIDE BLD-SCNC: 108 MMOL/L (ref 98–107)
CHLORIDE BLD-SCNC: 111 MMOL/L (ref 98–107)
CHP ED QC CHECK: YES
CO2: 15 MMOL/L (ref 22–29)
CO2: 17 MMOL/L (ref 22–29)
CO2: 17 MMOL/L (ref 22–29)
CREAT SERPL-MCNC: 0.5 MG/DL (ref 0.5–1)
CREAT SERPL-MCNC: 0.5 MG/DL (ref 0.5–1)
CREAT SERPL-MCNC: 0.6 MG/DL (ref 0.5–1)
CREAT SERPL-MCNC: 0.7 MG/DL (ref 0.5–1)
CREAT SERPL-MCNC: 0.7 MG/DL (ref 0.5–1)
EKG ATRIAL RATE: 119 BPM
EKG P AXIS: 71 DEGREES
EKG P-R INTERVAL: 134 MS
EKG Q-T INTERVAL: 318 MS
EKG QRS DURATION: 58 MS
EKG QTC CALCULATION (BAZETT): 447 MS
EKG R AXIS: 70 DEGREES
EKG T AXIS: 62 DEGREES
EKG VENTRICULAR RATE: 119 BPM
GFR AFRICAN AMERICAN: >60
GFR NON-AFRICAN AMERICAN: >60 ML/MIN/1.73
GLUCOSE BLD-MCNC: 107 MG/DL
GLUCOSE BLD-MCNC: 124 MG/DL
GLUCOSE BLD-MCNC: 135 MG/DL (ref 74–99)
GLUCOSE BLD-MCNC: 137 MG/DL
GLUCOSE BLD-MCNC: 138 MG/DL
GLUCOSE BLD-MCNC: 142 MG/DL
GLUCOSE BLD-MCNC: 152 MG/DL
GLUCOSE BLD-MCNC: 158 MG/DL
GLUCOSE BLD-MCNC: 164 MG/DL (ref 74–99)
GLUCOSE BLD-MCNC: 178 MG/DL (ref 74–99)
GLUCOSE BLD-MCNC: 207 MG/DL
GLUCOSE BLD-MCNC: 212 MG/DL (ref 74–99)
GLUCOSE BLD-MCNC: 248 MG/DL
GLUCOSE BLD-MCNC: 295 MG/DL
GLUCOSE BLD-MCNC: 306 MG/DL
GLUCOSE BLD-MCNC: 352 MG/DL (ref 74–99)
MAGNESIUM: 1.9 MG/DL (ref 1.6–2.6)
MAGNESIUM: 2 MG/DL (ref 1.6–2.6)
MAGNESIUM: 2.1 MG/DL (ref 1.6–2.6)
METER GLUCOSE: 107 MG/DL (ref 74–99)
METER GLUCOSE: 124 MG/DL (ref 74–99)
METER GLUCOSE: 137 MG/DL (ref 74–99)
METER GLUCOSE: 138 MG/DL (ref 74–99)
METER GLUCOSE: 142 MG/DL (ref 74–99)
METER GLUCOSE: 152 MG/DL (ref 74–99)
METER GLUCOSE: 158 MG/DL (ref 74–99)
METER GLUCOSE: 161 MG/DL (ref 74–99)
METER GLUCOSE: 180 MG/DL (ref 74–99)
METER GLUCOSE: 207 MG/DL (ref 74–99)
METER GLUCOSE: 248 MG/DL (ref 74–99)
METER GLUCOSE: 267 MG/DL (ref 74–99)
METER GLUCOSE: 295 MG/DL (ref 74–99)
METER GLUCOSE: 299 MG/DL (ref 74–99)
METER GLUCOSE: 306 MG/DL (ref 74–99)
PHOSPHORUS: 1.8 MG/DL (ref 2.5–4.5)
PHOSPHORUS: 2.3 MG/DL (ref 2.5–4.5)
PHOSPHORUS: 2.5 MG/DL (ref 2.5–4.5)
PHOSPHORUS: 3.1 MG/DL (ref 2.5–4.5)
PHOSPHORUS: 3.2 MG/DL (ref 2.5–4.5)
POTASSIUM SERPL-SCNC: 3.6 MMOL/L (ref 3.5–5)
POTASSIUM SERPL-SCNC: 3.6 MMOL/L (ref 3.5–5)
POTASSIUM SERPL-SCNC: 3.9 MMOL/L (ref 3.5–5)
POTASSIUM SERPL-SCNC: 4 MMOL/L (ref 3.5–5)
POTASSIUM SERPL-SCNC: 4.4 MMOL/L (ref 3.5–5)
SODIUM BLD-SCNC: 133 MMOL/L (ref 132–146)
SODIUM BLD-SCNC: 134 MMOL/L (ref 132–146)
SODIUM BLD-SCNC: 135 MMOL/L (ref 132–146)
SODIUM BLD-SCNC: 138 MMOL/L (ref 132–146)
SODIUM BLD-SCNC: 139 MMOL/L (ref 132–146)

## 2021-11-17 PROCEDURE — 80048 BASIC METABOLIC PNL TOTAL CA: CPT

## 2021-11-17 PROCEDURE — 82962 GLUCOSE BLOOD TEST: CPT

## 2021-11-17 PROCEDURE — 82010 KETONE BODYS QUAN: CPT

## 2021-11-17 PROCEDURE — 6370000000 HC RX 637 (ALT 250 FOR IP): Performed by: INTERNAL MEDICINE

## 2021-11-17 PROCEDURE — 6370000000 HC RX 637 (ALT 250 FOR IP): Performed by: FAMILY MEDICINE

## 2021-11-17 PROCEDURE — 6360000002 HC RX W HCPCS: Performed by: FAMILY MEDICINE

## 2021-11-17 PROCEDURE — 99283 EMERGENCY DEPT VISIT LOW MDM: CPT | Performed by: INTERNAL MEDICINE

## 2021-11-17 PROCEDURE — 2060000000 HC ICU INTERMEDIATE R&B

## 2021-11-17 PROCEDURE — 84100 ASSAY OF PHOSPHORUS: CPT

## 2021-11-17 PROCEDURE — 93010 ELECTROCARDIOGRAM REPORT: CPT | Performed by: INTERNAL MEDICINE

## 2021-11-17 PROCEDURE — 2580000003 HC RX 258: Performed by: FAMILY MEDICINE

## 2021-11-17 PROCEDURE — 83735 ASSAY OF MAGNESIUM: CPT

## 2021-11-17 RX ORDER — MAGNESIUM SULFATE 1 G/100ML
1000 INJECTION INTRAVENOUS PRN
Status: DISCONTINUED | OUTPATIENT
Start: 2021-11-17 | End: 2021-11-19 | Stop reason: HOSPADM

## 2021-11-17 RX ORDER — POLYETHYLENE GLYCOL 3350 17 G/17G
17 POWDER, FOR SOLUTION ORAL DAILY PRN
Status: DISCONTINUED | OUTPATIENT
Start: 2021-11-17 | End: 2021-11-19 | Stop reason: HOSPADM

## 2021-11-17 RX ORDER — SODIUM CHLORIDE 450 MG/100ML
INJECTION, SOLUTION INTRAVENOUS CONTINUOUS
Status: DISCONTINUED | OUTPATIENT
Start: 2021-11-17 | End: 2021-11-18

## 2021-11-17 RX ORDER — POTASSIUM CHLORIDE 7.45 MG/ML
10 INJECTION INTRAVENOUS PRN
Status: DISCONTINUED | OUTPATIENT
Start: 2021-11-17 | End: 2021-11-19 | Stop reason: HOSPADM

## 2021-11-17 RX ORDER — DEXTROSE AND SODIUM CHLORIDE 5; .45 G/100ML; G/100ML
INJECTION, SOLUTION INTRAVENOUS CONTINUOUS PRN
Status: DISCONTINUED | OUTPATIENT
Start: 2021-11-17 | End: 2021-11-19 | Stop reason: HOSPADM

## 2021-11-17 RX ORDER — INSULIN ASPART 100 [IU]/ML
0-8 INJECTION, SOLUTION INTRAVENOUS; SUBCUTANEOUS
Status: ON HOLD | COMMUNITY
End: 2021-11-19 | Stop reason: HOSPADM

## 2021-11-17 RX ORDER — DEXTROSE MONOHYDRATE 25 G/50ML
12.5 INJECTION, SOLUTION INTRAVENOUS PRN
Status: DISCONTINUED | OUTPATIENT
Start: 2021-11-17 | End: 2021-11-19 | Stop reason: HOSPADM

## 2021-11-17 RX ORDER — INSULIN GLARGINE 100 [IU]/ML
16 INJECTION, SOLUTION SUBCUTANEOUS NIGHTLY
Status: DISCONTINUED | OUTPATIENT
Start: 2021-11-17 | End: 2021-11-18

## 2021-11-17 RX ORDER — INSULIN GLARGINE 100 [IU]/ML
0.25 INJECTION, SOLUTION SUBCUTANEOUS NIGHTLY
Status: DISCONTINUED | OUTPATIENT
Start: 2021-11-17 | End: 2021-11-17

## 2021-11-17 RX ORDER — INSULIN GLARGINE 100 [IU]/ML
22 INJECTION, SOLUTION SUBCUTANEOUS NIGHTLY
Status: DISCONTINUED | OUTPATIENT
Start: 2021-11-17 | End: 2021-11-17

## 2021-11-17 RX ORDER — INSULIN GLARGINE 100 [IU]/ML
22 INJECTION, SOLUTION SUBCUTANEOUS NIGHTLY
Status: ON HOLD | COMMUNITY
End: 2022-01-09 | Stop reason: HOSPADM

## 2021-11-17 RX ADMIN — POTASSIUM CHLORIDE 10 MEQ: 10 INJECTION, SOLUTION INTRAVENOUS at 20:24

## 2021-11-17 RX ADMIN — POTASSIUM CHLORIDE 10 MEQ: 10 INJECTION, SOLUTION INTRAVENOUS at 11:56

## 2021-11-17 RX ADMIN — POTASSIUM CHLORIDE 10 MEQ: 10 INJECTION, SOLUTION INTRAVENOUS at 10:49

## 2021-11-17 RX ADMIN — DEXTROSE AND SODIUM CHLORIDE: 5; 450 INJECTION, SOLUTION INTRAVENOUS at 20:37

## 2021-11-17 RX ADMIN — INSULIN GLARGINE 11 UNITS: 100 INJECTION, SOLUTION SUBCUTANEOUS at 00:50

## 2021-11-17 RX ADMIN — INSULIN GLARGINE 16 UNITS: 100 INJECTION, SOLUTION SUBCUTANEOUS at 21:34

## 2021-11-17 RX ADMIN — ENOXAPARIN SODIUM 40 MG: 100 INJECTION SUBCUTANEOUS at 08:47

## 2021-11-17 RX ADMIN — DEXTROSE AND SODIUM CHLORIDE: 5; 450 INJECTION, SOLUTION INTRAVENOUS at 10:48

## 2021-11-17 RX ADMIN — DEXTROSE AND SODIUM CHLORIDE: 5; 450 INJECTION, SOLUTION INTRAVENOUS at 02:53

## 2021-11-17 RX ADMIN — SODIUM CHLORIDE: 4.5 INJECTION, SOLUTION INTRAVENOUS at 00:07

## 2021-11-17 RX ADMIN — SODIUM CHLORIDE 4.54 UNITS/HR: 9 INJECTION, SOLUTION INTRAVENOUS at 01:39

## 2021-11-17 NOTE — ED NOTES
FIRST PROVIDER CONTACT ASSESSMENT NOTE           Department of Emergency Medicine                 First Provider Note            21  7:23 PM EST    Date of Encounter: No admission date for patient encounter. Patient Name: Maegan Nicolas  : 1995  MRN: [id number]    Chief Complaint: Dizziness (NVD x 1 day)      History of Present Illness:   Maegan Nicolas is a 27 y.o. female who presents to the ED for nausea vomiting, dizziness. Patient presents to the emergency department with complaints of vomiting and dizziness for the last day. Patient woke up today not feeling well, reports emesis x2. Patient with history significant for DKA in the past.  Patient also reporting dizziness, unable to open her eyes. No head injury no fall. Reports that she did take her insulin today and her fingerstick blood sugar was 237. Patient tachycardic here 123    Focused Physical Exam:  VS:    ED Triage Vitals   BP Temp Temp src Pulse Resp SpO2 Height Weight   -- -- -- -- -- -- -- --        Physical Ex: Constitutional: Alert and non-toxic. Medical History:  has a past medical history of Bleeding at insertion site, Common femoral artery injury, right, initial encounter, Depressive disorder, and DM type 1 (diabetes mellitus, type 1) ([facility] [address] Surgical History:  has a past surgical history that includes Abdomen surgery (N/A, 2019) and Bartholin gland cyst excision. Social History:  reports that she has never smoked. She has never used smokeless tobacco. She reports current drug use. Drug: Marijuana Mendez Isabela). She reports that she does not drink alcohol. Family History: family history includes Diabetes in her maternal grandmother and paternal grandmother; Stroke in an other family member. Allergies: Patient has no known allergies.      Initial Plan of Care: Initiate Treatment-Testing, Proceed toTreatment Area When Bed Available for ED Attending/MLP to Continue Care      ---END OF FIRST PROVIDER CONTACT ASSESSMENT NOTE---  Electronically signed by MIKE Guardado CNP   DD: 11/16/21       MIKE Guardado CNP  11/16/21 [id number]

## 2021-11-17 NOTE — ED NOTES
Sylvester served Dr. Maryanna Severance, pt's hospitalist, about glucose 107, gap has been closed for a while, still on insulin gtt, and on dextrose mix gtt. Request orders for insulin clarification.      Tommie Lala RN  11/17/21 [id number]

## 2021-11-17 NOTE — ED NOTES
Bed: [facility].  Expected date:   Expected time:   Means of arrival:   Comments:  triage     Jose Lynn RN  11/16/21 2044

## 2021-11-17 NOTE — ED PROVIDER NOTES
24-year-old female with history of type 1 diabetes presenting for nausea, emesis, and lightheadedness. She states she has been in DKA before and feels like she is in DKA. She complains of low back pain that she states she gets when she is in DKA. She complains of shortness of breath with exertion, relieved with rest.  She complains of nausea and emesis and abdominal soreness that she attributes to vomiting. She denies any fever, chills, chest pain, cough, and congestion. Review of Systems   Constitutional: Negative for chills and fever. HENT: Negative for congestion and sore throat. Eyes: Negative for photophobia and visual disturbance. Respiratory: Positive for shortness of breath. Negative for cough. Cardiovascular: Negative for chest pain. Gastrointestinal: Positive for abdominal pain, nausea and vomiting. Negative for diarrhea. Genitourinary: Negative for flank pain. Musculoskeletal: Positive for back pain. Negative for neck pain. Skin: Negative for rash and wound. Neurological: Positive for light-headedness. Negative for headaches. Physical Exam  Constitutional:       Comments: Patient curled up in bed lying on side, appears mildly ill   HENT:      Head: Normocephalic and atraumatic. Right Ear: External ear normal.      Left Ear: External ear normal.      Nose: Nose normal.      Mouth/Throat:      Mouth: Mucous membranes are dry. Eyes:      Extraocular Movements: Extraocular movements intact. Conjunctiva/sclera: Conjunctivae normal.      Pupils: Pupils are equal, round, and reactive to light. Cardiovascular:      Rate and Rhythm: Regular rhythm. Tachycardia present. Pulmonary:      Effort: Pulmonary effort is normal.      Breath sounds: Normal breath sounds. Abdominal:      General: There is no distension. Comments: Diffuse soreness to palpation   Musculoskeletal:         General: No swelling or deformity.       Cervical back: Normal range of orders placed or performed during the hospital encounter of 11/16/21   CBC Auto Differential   Result Value Ref Range    WBC 15.5 (H) 4.5 - 11.5 E9/L    RBC 5.56 (H) 3.50 - 5.50 E12/L    Hemoglobin 15.5 11.5 - 15.5 g/dL    Hematocrit 46.4 34.0 - 48.0 %    MCV 83.5 80.0 - 99.9 fL    MCH 27.9 26.0 - 35.0 pg    MCHC 33.4 32.0 - 34.5 %    RDW 13.8 11.5 - 15.0 fL    Platelets 779 657 - 349 E9/L    MPV 11.2 7.0 - 12.0 fL    Neutrophils % 73.9 43.0 - 80.0 %    Immature Granulocytes % 0.5 0.0 - 5.0 %    Lymphocytes % 19.4 (L) 20.0 - 42.0 %    Monocytes % 5.6 2.0 - 12.0 %    Eosinophils % 0.1 0.0 - 6.0 %    Basophils % 0.5 0.0 - 2.0 %    Neutrophils Absolute 11.49 (H) 1.80 - 7.30 E9/L    Immature Granulocytes # 0.07 E9/L    Lymphocytes Absolute 3.02 1.50 - 4.00 E9/L    Monocytes Absolute 0.87 0.10 - 0.95 E9/L    Eosinophils Absolute 0.01 (L) 0.05 - 0.50 E9/L    Basophils Absolute 0.07 0.00 - 0.20 E9/L   Magnesium   Result Value Ref Range    Magnesium 2.6 1.6 - 2.6 mg/dL   Lactic Acid, Plasma   Result Value Ref Range    Lactic Acid 2.7 (H) 0.5 - 2.2 mmol/L   Urinalysis   Result Value Ref Range    Color, UA Yellow Straw/Yellow    Clarity, UA SL CLOUDY Clear    Glucose, Ur >=1000 (A) Negative mg/dL    Bilirubin Urine SMALL (A) Negative    Ketones, Urine >=80 (A) Negative mg/dL    Specific Gravity, UA 1.025 1.005 - 1.030    Blood, Urine Negative Negative    pH, UA 5.5 5.0 - 9.0    Protein, UA 30 (A) Negative mg/dL    Urobilinogen, Urine 0.2 <2.0 E.U./dL    Nitrite, Urine Negative Negative    Leukocyte Esterase, Urine SMALL (A) Negative   Beta-Hydroxybutyrate   Result Value Ref Range    Beta-Hydroxybutyrate >4.50 (H) 0.02 - 0.27 mmol/L   SPECIMEN REJECTION   Result Value Ref Range    Rejected Test vph     Reason for Rejection see below    POC Pregnancy Urine   Result Value Ref Range    HCG, Urine, POC Negative Negative    Lot Number [id number]     Positive QC Pass/Fail Acceptable     Negative QC Pass/Fail Acceptable Radiology  XR CHEST PORTABLE   Final Result   No acute process. EKG:  This EKG is interpreted by me. Rate: 119  Rhythm: Sinus  Interpretation: sinus tachycardia, normal intervals, no acute ischemic changes  Comparison: stable as compared to patient's most recent EKG      ------------------------- NURSING NOTES AND VITALS REVIEWED ---------------------------  Date / Time Roomed:  11/16/2021  8:44 PM  ED Bed Assignment:  [facility]/    The nursing notes within the ED encounter and vital signs as below have been reviewed. Patient Vitals for the past 24 hrs:   BP Temp Pulse Resp SpO2 Weight   11/16/21 1923 95/67 98.1 °F (36.7 °C) 124 16 100 % 100 lb (45.4 kg)       Oxygen Saturation Interpretation: Normal      ------------------------------------------ PROGRESS NOTES ------------------------------------------    I have spoken with the patient and discussed todays results, in addition to providing specific details for the plan of care and counseling regarding the diagnosis and prognosis. Their questions are answered at this time and they are agreeable with the plan.      --------------------------------- ADDITIONAL PROVIDER NOTES ---------------------------------    This patient's ED course included: a personal history and physicial examination, re-evaluation prior to disposition, multiple bedside re-evaluations, IV medications, cardiac monitoring, continuous pulse oximetry and complex medical decision making and emergency management    This patient has remained hemodynamically stable during their ED course. Please note that the withdrawal or failure to initiate urgent interventions for this patient would likely result in a life threatening deterioration or permanent disability. Accordingly this patient received 44 minutes of critical care time, excluding separately billable procedures. Clinical Impression  1.  Diabetic ketoacidosis without coma associated with type 1 diabetes mellitus [facility])          Disposition  Patient's disposition: Admit to CCU/ICU  Patient's condition is stable.             Ann-Marie Saha MD  Resident  11/17/21 5399       Ann-Marie Saha MD  Resident  11/17/21 6078

## 2021-11-17 NOTE — ED NOTES
Bed: 17B-17  Expected date:   Expected time:   Means of arrival:   Comments:  [address] [address], RN  11/17/21 0025

## 2021-11-17 NOTE — ED NOTES
Spoke with an ICU rn about the titration and gap on this icu hold pt and that reuben had told me to leave insulin settings as is and they would review and make orders. Concerned that I have not decreased pt's insulin for a couple hours and was asking for clarification. Was directed to have pharmacy call physician to get insulin orders.      Judith Delacruz RN  11/17/21 5780

## 2021-11-17 NOTE — CONSULTS
ENDOCRINOLOGY INITIAL CONSULTATION NOTE       Date of admission: 11/16/2021  Date of service: 11/17/2021  Admitting physician: No admitting provider for patient encounter. Primary Care Physician: Ira Castro DO  Consultant physician: Laury Bal MD     Reason for the consultation:  DM type 1 admitted with DKA     History of Present Illness:  Services were provided through a video synchronous discussion     Maegan Nicolas is a 27 y.o. old female with PMH of DM type 1 admitted to [address] [place] [address] on 11/16/2021 because of N/V and malaise and found to be in DKA, endocrine team was consulted for diabetes management. The patient denies fever, chills, chest pain, cough  Admission labs: , AG 27, Bicarb 11, Cr 0.9, K 4.9, BHB >4.5     The patient was started on insulin drip as per DKA protocol then transitioned to sq insulin this AM     Prior to admission  Type 1 DM was diagnosed at the age 8. Prior to admission, she was basaglar 22 units daily, Humalog with meals using carb ratio 1u:10g  + ss 1:50>150. The patient was also on insulin pump I the past.she was checking checks BS 2-3 times a day and self-blood glucose monitoring has been highly variable prior to admission. She is due for annual eye exam but denied any history of diabetic retinopathy.   The patient performs her own feet care and doesn't see podiatry service  Microvascular complications:  No Retinopathy, Nephropathy + Neuropathy   Macrovascular complications: no CAD, PVD, or Stroke    Lab Results   Component Value Date    LABA1C 14.2 09/17/2021     Inpatient diet:   Carb Restricted diet     Point of care glucose monitoring (Independently reviewed)   Recent Labs     11/17/21  0455 11/17/21  0543 11/17/21  0657 11/17/21  0825 11/17/21  0951 11/17/21  1151 11/17/21  1446 11/17/21  1756   GLUMET 161* 142* 137* 107* 138* 124* 152* 158*       Past medical history:   Past Medical History:   Diagnosis Date    Bleeding at insertion site 1/5/2018    Common femoral artery injury, right, initial encounter 1/5/2018    Depressive disorder     DM type 1 (diabetes mellitus, type 1) ([facility] [address]        Past surgical history:  Past Surgical History:   Procedure Laterality Date    ABDOMEN SURGERY N/A 5/9/2019    INCISION AND DRAINAGE OF SUPRAPUBIC ABSESS performed by Violet Cox MD at [address]      last yr       Social history:   Tobacco:   reports that she has never smoked. She has never used smokeless tobacco.  Alcohol:   reports no history of alcohol use. Drugs:   reports current drug use. Drug: Marijuana Zoey Contreras). Family history:    Family History   Problem Relation Age of Onset    Diabetes Maternal Grandmother     Diabetes Paternal Grandmother     Stroke Other     Thyroid Disease Neg Hx        Allergy and drug reactions:   No Known Allergies    Scheduled Meds:   enoxaparin  40 mg SubCUTAneous Daily    insulin glargine  16 Units SubCUTAneous Nightly     PRN Meds:   dextrose, 12.5 g, PRN  potassium chloride, 10 mEq, PRN  magnesium sulfate, 1,000 mg, PRN  polyethylene glycol, 17 g, Daily PRN  dextrose 5 % and 0.45 % NaCl, , Continuous PRN  phosphorus, 250 mg, PRN      Continuous Infusions:   sodium chloride Stopped (11/17/21 0253)    dextrose 5 % and 0.45 % NaCl 150 mL/hr at 11/17/21 1048    insulin 0.033 Units/kg/hr (11/17/21 0827)       Review of Systems  All systems reviewed. All negative except for symptoms mentioned in HPI     OBJECTIVE    /73   Pulse 111   Temp 98.1 °F (36.7 °C)   Resp 13   Wt 100 lb (45.4 kg)   SpO2 99%   BMI 18.89 kg/m²     Intake/Output Summary (Last 24 hours) at 11/17/2021 1923  Last data filed at 11/17/2021 0007  Gross per 24 hour   Intake 1050 ml   Output --   Net 1050 ml     Physical examination:  Due to this being a TeleHealth encounter, evaluation of the following organ systems is limited: Vitals/Constitutional/EENT/Resp/CV/GI//MS/Neuro/Skin/Heme-Lymph-Imm.     Modified physical exam through Telemedicine camera    General: Communicating well via camera   Neck: no obvious neck mass. No obvious neck deformity     CVS: no distress   Chest: no distress. Chest is moving with respiration    Extremities:  no visible tremor  Skin: No visible rashes as seen from camera   Musculoskeletal: no visible deformity  Neuro: Alert and oriented to person, place, and time. Psychiatric: Normal mood and affect. Behavior is normal    Review of Laboratory Data:  I personally reviewed the following labs:   Recent Labs     11/16/21  1932   WBC 15.5*   RBC 5.56*   HGB 15.5   HCT 46.4   MCV 83.5   MCH 27.9   MCHC 33.4   RDW 13.8      MPV 11.2     Recent Labs     11/16/21  2200 11/16/21  2325 11/17/21  0821 11/17/21  0825 11/17/21  1451 11/17/21  1758 11/17/21  1803      < > 138  --  134  --  139   K 4.9   < > 3.6  --  3.9  --  4.0   CL 97*   < > 108*  --  106  --  111*   CO2 11*   < > 17*  --  17*  --  15*   BUN 32*   < > 21*  --  15  --  12   CREATININE 0.9   < > 0.6  --  0.5  --  0.5   GLUCOSE 309*   < > 135*   < > 164* 158 178*   CALCIUM 10.5*   < > 9.3  --  9.2  --  9.2   PROT 9.0*  --   --   --   --   --   --    LABALBU 4.8  --   --   --   --   --   --    BILITOT 0.4  --   --   --   --   --   --    ALKPHOS 149*  --   --   --   --   --   --    AST 25  --   --   --   --   --   --    ALT 45*  --   --   --   --   --   --     < > = values in this interval not displayed.      Beta-Hydroxybutyrate   Date Value Ref Range Status   11/17/2021 1.30 (H) 0.02 - 0.27 mmol/L Final   11/16/2021 >4.50 (H) 0.02 - 0.27 mmol/L Final   09/17/2021 >4.50 (H) 0.02 - 0.27 mmol/L Final     Lab Results   Component Value Date    LABA1C 14.2 09/17/2021    LABA1C 15.1 07/26/2021    LABA1C 11.2 10/18/2020     No results found for: TSH, T4FREE, U7MKZQK, FT3, E0MNEWE, TSI, TPOABS, THGAB  Lab Results   Component Value Date    LABA1C 14.2 09/17/2021    GLUCOSE 178 11/17/2021    MALBCR 53.2 08/24/2021    LABMICR 81.9 08/24/2021 LABCREA 154 08/24/2021     Lab Results   Component Value Date    TRIG 106 01/24/2018    HDL 40 01/24/2018    LDLCALC 114 01/24/2018    CHOL 175 01/24/2018       Blood culture   Lab Results   Component Value Date    BC 5 Days no growth 02/24/2021    BC 5 Days no growth 11/10/2020       Radiology:  XR CHEST PORTABLE   Final Result   No acute process. Medical Records/Labs/Images review:   I personally reviewed and summarized previous records   All labs and imaging were reviewed independently     Bob Espinoza, a 27 y.o.-old female seen today for inpatient diabetes management     Brittle type 1 DM admitted with DKA   · Patient's DM is profoundly uncontrolled due to poor compliance with insulin therapy and diet  · We recommend following transition regimen   · Lantus 16 U nightly   · Medium dose sliding scale     · Glucose check before meals and at bed time   · Will titrate insulin dose based on blood glucose trend & insulin requirement  · Pt will follow with us after discharge    Recurrent DKA due to medications non-compliance   · I have discussed with her the great importance of following the treatment plan exactly as directed in order to achieve a good medical outcome. · Pt understand that she always need insulin. She was instructed to stop at the office anytime to  free insulin      The above issues were reviewed with the patient who understood and agreed with the plan. Thank you for allowing us to participate in the care of this patient. Please do not hesitate to contact us with any additional questions. Castro Vera MD  Endocrinologist, [facility] Diabetes [facility] and Endocrinology   [address] [facility] [address]   Phone: [phone]  Fax: [phone]  --------------------------------------------  An electronic signature was used to authenticate this note.  Ifrah Souza MD on 11/17/2021 at 7:23 PM

## 2021-11-17 NOTE — H&P
Hospitalist History & Physical      PCP: Louis Calvin DO    Date of Service: Pt seen/examined on 11/17/2021     Chief Complaint:  had concerns including Dizziness (NVD x 1 day, history of DKA). History Of Present Illness:    Ms. Mary Valdez, a 27y.o. year old female  who  has a past medical history of Bleeding at insertion site, Common femoral artery injury, right, initial encounter, Depressive disorder, and DM type 1 (diabetes mellitus, type 1) ([facility] [address] Patient presented to the emergency department with nausea and vomiting. Has a history of type 1 diabetes and has been in DKA before. She thinks she is in DKA now. Also complaining of low back pain and shortness of breath. Denies fever, chills, chest pain, cough. Vital signs within normal limits and stable with the exception of heart rate which is 115. Laboratory studies reveal BUN 32, anion gap 27, glucose 309, alk phos 149, WBC 15.5 pH 7.23, beta hydroxybutyrate >4.50. Urinalysis shows small leukocyte esterase in the presence of trichomonas. Patient was started on insulin infusion and IV fluids and medicine was consulted for admission patient be admitted to the ICU. Past Medical History:   Diagnosis Date    Bleeding at insertion site 1/5/2018    Common femoral artery injury, right, initial encounter 1/5/2018    Depressive disorder     DM type 1 (diabetes mellitus, type 1) ([facility]        Past Surgical History:   Procedure Laterality Date    ABDOMEN SURGERY N/A 5/9/2019    INCISION AND DRAINAGE OF SUPRAPUBIC ABSESS performed by Shannan Crum MD at [address] [place] [address]      last yr       Prior to Admission medications    Medication Sig Start Date End Date Taking?  Authorizing Provider   insulin aspart (NOVOLOG FLEXPEN) 100 UNIT/ML injection pen 1 unit for every 10 grams of carbs, plus sliding scale -200 add 2U, -250 add 4U, -300 add 6U, -350 add 8U, -400 add 12U, BS over 400 add 12U. MAX 30U/day 9/18/21   Flory Mccarthy MD   insulin glargine [facility]) 100 UNIT/ML injection pen Inject 22 units nightly 9/18/21   Flory Mccarthy MD   Insulin Pen Needle (BD PEN NEEDLE SHELBIE U/F) 32G X 4 MM MISC Uses with insulin 4 times a day 3/17/21   Flory Mccarthy MD         Allergies:  Patient has no known allergies. Social History:    TOBACCO:   reports that she has never smoked. She has never used smokeless tobacco.  ETOH:   reports no history of alcohol use. Family History:    Reviewed in detail and negative for DM, CAD, Cancer, CVA. Positive as follows\"      Problem Relation Age of Onset    Diabetes Maternal Grandmother     Diabetes Paternal Grandmother     Stroke Other     Thyroid Disease Neg Hx        REVIEW OF SYSTEMS:   Pertinent positives as noted in the HPI. All other systems reviewed and negative. PHYSICAL EXAM:  /80   Pulse 111   Temp 98.1 °F (36.7 °C)   Resp 12   Wt 100 lb (45.4 kg)   SpO2 99%   BMI 18.89 kg/m²   General appearance: Appears ill, sleepy  HEENT: Normal cephalic, atraumatic without obvious deformity. EOMI  Neck: Supple, with full range of motion  Respiratory: Clear to auscultation bilaterally  Cardiovascular: Tachycardic  Abdomen: Soft, diffusely tender to palpation, nondistended  Musculoskeletal: Normal range of motion, no deformities  Skin: Normal skin color. No rashes or lesions.   Neurologic: Alert and oriented x3, cranial nerves II to XII grossly intact      CBC:   Recent Labs     11/16/21 1932   WBC 15.5*   RBC 5.56*   HGB 15.5   HCT 46.4   MCV 83.5   RDW 13.8        BMP:   Recent Labs     11/16/21 1932 11/16/21 2200   NA  --  135   K  --  4.9   CL  --  97*   CO2  --  11*   BUN  --  32*   CREATININE  --  0.9   MG 2.6  --    PHOS  --  5.2*     LFT:  Recent Labs     11/16/21 2200   PROT 9.0*   ALKPHOS 149*   ALT 45*   AST 25   BILITOT 0.4     CE:  No results for input(s): Gera Rhodes in the last 72 hours.  PT/INR: No results for input(s): INR, APTT in the last 72 hours. BNP: No results for input(s): BNP in the last 72 hours. ESR:   Lab Results   Component Value Date    SEDRATE 14 03/08/2020     CRP:   Lab Results   Component Value Date    CRP <0.1 10/18/2020     D Dimer:   Lab Results   Component Value Date    DDIMER 257 10/18/2020      Folate and B12: No results found for: [id number], No results found for: FOLATE  Lactic Acid:   Lab Results   Component Value Date    LACTA 2.7 (H) 11/16/2021     Thyroid Studies: No results found for: TSH, Refugia Camper    Oupatient labs:  Lab Results   Component Value Date    CHOL 175 01/24/2018    TRIG 106 01/24/2018    HDL 40 01/24/2018    LDLCALC 114 (H) 01/24/2018    INR 0.9 12/14/2020    LABA1C 14.2 (H) 09/17/2021       Urinalysis:    Lab Results   Component Value Date    NITRU Negative 11/16/2021    WBCUA 1-3 11/16/2021    BACTERIA FEW 11/16/2021    RBCUA 1-3 11/16/2021    BLOODU Negative 11/16/2021    SPECGRAV 1.025 11/16/2021    GLUCOSEU >=1000 11/16/2021       Imaging:  XR CHEST PORTABLE    Result Date: 11/16/2021  EXAMINATION: ONE XRAY VIEW OF THE CHEST 11/16/2021 9:48 pm COMPARISON: 09/17/2021 HISTORY: ORDERING SYSTEM PROVIDED HISTORY: r/o pneumonia TECHNOLOGIST PROVIDED HISTORY: Reason for exam:->r/o pneumonia What reading provider will be dictating this exam?->CRC FINDINGS: The lungs are without acute focal process. There is no effusion or pneumothorax. The cardiomediastinal silhouette is without acute process. The osseous structures are without acute process. No acute process.        ASSESSMENT:  -Diabetic ketoacidosis  -Nausea vomiting  -Trichomoniasis  -Type 1 diabetes      PLAN:  -Admit to ICU  -Consult critical care  -Consult endocrinology  -Insulin infusion per DKA protocol  -IV fluids per DKA protocol  -Metronidazole 5 mg twice daily        Diet: Diet NPO  Code Status: Prior  Surrogate decision maker confirmed with patient:   Extended Emergency Contact Information  Primary Emergency Contact: Edwina Mckay  Address: [facility], [address] [place] [address] Phone: [phone]  Mobile Phone: [phone]  Relation: Parent   needed? No  Secondary Emergency Contact: Erich Mckay  Address: [facility], [address] [place] [address] Phone: [phone]  Mobile Phone: [phone]  Relation: Parent   needed? No    DVT Prophylaxis: []Lovenox []Heparin []PCD [] [address] []Encouraged ambulation  Disposition: []Med/Surg [] Intermediate [] ICU/CCU  Admit status: [] Observation [] Inpatient     +++++++++++++++++++++++++++++++++++++++++++++++++  Serena Luis DO  [address]  +++++++++++++++++++++++++++++++++++++++++++++++++  NOTE: This report was transcribed using voice recognition software. Every effort was made to ensure accuracy; however, inadvertent computerized transcription errors may be present.

## 2021-11-18 LAB
ANION GAP SERPL CALCULATED.3IONS-SCNC: 10 MMOL/L (ref 7–16)
ANION GAP SERPL CALCULATED.3IONS-SCNC: 10 MMOL/L (ref 7–16)
ANION GAP SERPL CALCULATED.3IONS-SCNC: 11 MMOL/L (ref 7–16)
ANION GAP SERPL CALCULATED.3IONS-SCNC: 13 MMOL/L (ref 7–16)
ANION GAP SERPL CALCULATED.3IONS-SCNC: 13 MMOL/L (ref 7–16)
ANION GAP SERPL CALCULATED.3IONS-SCNC: 15 MMOL/L (ref 7–16)
BASOPHILS ABSOLUTE: 0.02 E9/L (ref 0–0.2)
BASOPHILS RELATIVE PERCENT: 0.3 % (ref 0–2)
BETA-HYDROXYBUTYRATE: 0.19 MMOL/L (ref 0.02–0.27)
BUN BLDV-MCNC: 11 MG/DL (ref 6–20)
BUN BLDV-MCNC: 11 MG/DL (ref 6–20)
BUN BLDV-MCNC: 14 MG/DL (ref 6–20)
BUN BLDV-MCNC: 15 MG/DL (ref 6–20)
BUN BLDV-MCNC: 16 MG/DL (ref 6–20)
BUN BLDV-MCNC: 9 MG/DL (ref 6–20)
CALCIUM SERPL-MCNC: 7.7 MG/DL (ref 8.6–10.2)
CALCIUM SERPL-MCNC: 8.4 MG/DL (ref 8.6–10.2)
CALCIUM SERPL-MCNC: 8.7 MG/DL (ref 8.6–10.2)
CALCIUM SERPL-MCNC: 8.8 MG/DL (ref 8.6–10.2)
CALCIUM SERPL-MCNC: 9 MG/DL (ref 8.6–10.2)
CALCIUM SERPL-MCNC: 9.2 MG/DL (ref 8.6–10.2)
CHLORIDE BLD-SCNC: 105 MMOL/L (ref 98–107)
CHLORIDE BLD-SCNC: 106 MMOL/L (ref 98–107)
CHLORIDE BLD-SCNC: 108 MMOL/L (ref 98–107)
CHLORIDE BLD-SCNC: 108 MMOL/L (ref 98–107)
CHP ED QC CHECK: NORMAL
CO2: 14 MMOL/L (ref 22–29)
CO2: 15 MMOL/L (ref 22–29)
CO2: 17 MMOL/L (ref 22–29)
CO2: 17 MMOL/L (ref 22–29)
CO2: 18 MMOL/L (ref 22–29)
CO2: 18 MMOL/L (ref 22–29)
CREAT SERPL-MCNC: 0.6 MG/DL (ref 0.5–1)
CREAT SERPL-MCNC: 0.7 MG/DL (ref 0.5–1)
CREAT SERPL-MCNC: 0.7 MG/DL (ref 0.5–1)
CREAT SERPL-MCNC: 0.9 MG/DL (ref 0.5–1)
EOSINOPHILS ABSOLUTE: 0.03 E9/L (ref 0.05–0.5)
EOSINOPHILS RELATIVE PERCENT: 0.4 % (ref 0–6)
GFR AFRICAN AMERICAN: >60
GFR NON-AFRICAN AMERICAN: >60 ML/MIN/1.73
GLUCOSE BLD-MCNC: 136 MG/DL
GLUCOSE BLD-MCNC: 152 MG/DL (ref 74–99)
GLUCOSE BLD-MCNC: 278 MG/DL (ref 74–99)
GLUCOSE BLD-MCNC: 285 MG/DL (ref 74–99)
GLUCOSE BLD-MCNC: 294 MG/DL (ref 74–99)
GLUCOSE BLD-MCNC: 306 MG/DL (ref 74–99)
GLUCOSE BLD-MCNC: 349 MG/DL (ref 74–99)
HCT VFR BLD CALC: 31.8 % (ref 34–48)
HEMOGLOBIN: 10.5 G/DL (ref 11.5–15.5)
IMMATURE GRANULOCYTES #: 0.04 E9/L
IMMATURE GRANULOCYTES %: 0.5 % (ref 0–5)
LYMPHOCYTES ABSOLUTE: 1.83 E9/L (ref 1.5–4)
LYMPHOCYTES RELATIVE PERCENT: 23.7 % (ref 20–42)
MAGNESIUM: 1.8 MG/DL (ref 1.6–2.6)
MAGNESIUM: 1.9 MG/DL (ref 1.6–2.6)
MAGNESIUM: 2 MG/DL (ref 1.6–2.6)
MAGNESIUM: 2.2 MG/DL (ref 1.6–2.6)
MCH RBC QN AUTO: 28.2 PG (ref 26–35)
MCHC RBC AUTO-ENTMCNC: 33 % (ref 32–34.5)
MCV RBC AUTO: 85.3 FL (ref 80–99.9)
METER GLUCOSE: 136 MG/DL (ref 74–99)
METER GLUCOSE: 165 MG/DL (ref 74–99)
METER GLUCOSE: 206 MG/DL (ref 74–99)
METER GLUCOSE: 249 MG/DL (ref 74–99)
METER GLUCOSE: 268 MG/DL (ref 74–99)
METER GLUCOSE: 294 MG/DL (ref 74–99)
MONOCYTES ABSOLUTE: 0.69 E9/L (ref 0.1–0.95)
MONOCYTES RELATIVE PERCENT: 8.9 % (ref 2–12)
NEUTROPHILS ABSOLUTE: 5.12 E9/L (ref 1.8–7.3)
NEUTROPHILS RELATIVE PERCENT: 66.2 % (ref 43–80)
PDW BLD-RTO: 13.8 FL (ref 11.5–15)
PHOSPHORUS: 1.6 MG/DL (ref 2.5–4.5)
PHOSPHORUS: 2.2 MG/DL (ref 2.5–4.5)
PHOSPHORUS: 2.5 MG/DL (ref 2.5–4.5)
PHOSPHORUS: 2.7 MG/DL (ref 2.5–4.5)
PHOSPHORUS: 2.9 MG/DL (ref 2.5–4.5)
PHOSPHORUS: 2.9 MG/DL (ref 2.5–4.5)
PLATELET # BLD: 230 E9/L (ref 130–450)
PMV BLD AUTO: 10.8 FL (ref 7–12)
POTASSIUM SERPL-SCNC: 3.9 MMOL/L (ref 3.5–5)
POTASSIUM SERPL-SCNC: 4 MMOL/L (ref 3.5–5)
POTASSIUM SERPL-SCNC: 4 MMOL/L (ref 3.5–5)
POTASSIUM SERPL-SCNC: 4.1 MMOL/L (ref 3.5–5)
POTASSIUM SERPL-SCNC: 4.8 MMOL/L (ref 3.5–5)
POTASSIUM SERPL-SCNC: 5.3 MMOL/L (ref 3.5–5)
RBC # BLD: 3.73 E12/L (ref 3.5–5.5)
SODIUM BLD-SCNC: 130 MMOL/L (ref 132–146)
SODIUM BLD-SCNC: 134 MMOL/L (ref 132–146)
SODIUM BLD-SCNC: 135 MMOL/L (ref 132–146)
SODIUM BLD-SCNC: 139 MMOL/L (ref 132–146)
WBC # BLD: 7.7 E9/L (ref 4.5–11.5)

## 2021-11-18 PROCEDURE — 85025 COMPLETE CBC W/AUTO DIFF WBC: CPT

## 2021-11-18 PROCEDURE — 6370000000 HC RX 637 (ALT 250 FOR IP): Performed by: FAMILY MEDICINE

## 2021-11-18 PROCEDURE — 2580000003 HC RX 258: Performed by: INTERNAL MEDICINE

## 2021-11-18 PROCEDURE — 99232 SBSQ HOSP IP/OBS MODERATE 35: CPT | Performed by: INTERNAL MEDICINE

## 2021-11-18 PROCEDURE — 2060000000 HC ICU INTERMEDIATE R&B

## 2021-11-18 PROCEDURE — 80048 BASIC METABOLIC PNL TOTAL CA: CPT

## 2021-11-18 PROCEDURE — 83735 ASSAY OF MAGNESIUM: CPT

## 2021-11-18 PROCEDURE — 36415 COLL VENOUS BLD VENIPUNCTURE: CPT

## 2021-11-18 PROCEDURE — 82010 KETONE BODYS QUAN: CPT

## 2021-11-18 PROCEDURE — 6370000000 HC RX 637 (ALT 250 FOR IP): Performed by: INTERNAL MEDICINE

## 2021-11-18 PROCEDURE — 84100 ASSAY OF PHOSPHORUS: CPT

## 2021-11-18 PROCEDURE — 82962 GLUCOSE BLOOD TEST: CPT

## 2021-11-18 PROCEDURE — 6360000002 HC RX W HCPCS: Performed by: FAMILY MEDICINE

## 2021-11-18 RX ORDER — DEXTROSE MONOHYDRATE 50 MG/ML
100 INJECTION, SOLUTION INTRAVENOUS PRN
Status: DISCONTINUED | OUTPATIENT
Start: 2021-11-18 | End: 2021-11-19 | Stop reason: HOSPADM

## 2021-11-18 RX ORDER — SODIUM CHLORIDE 9 MG/ML
INJECTION, SOLUTION INTRAVENOUS CONTINUOUS
Status: ACTIVE | OUTPATIENT
Start: 2021-11-18 | End: 2021-11-19

## 2021-11-18 RX ORDER — NICOTINE POLACRILEX 4 MG
15 LOZENGE BUCCAL PRN
Status: DISCONTINUED | OUTPATIENT
Start: 2021-11-18 | End: 2021-11-19 | Stop reason: HOSPADM

## 2021-11-18 RX ORDER — DEXTROSE MONOHYDRATE 25 G/50ML
12.5 INJECTION, SOLUTION INTRAVENOUS PRN
Status: DISCONTINUED | OUTPATIENT
Start: 2021-11-18 | End: 2021-11-19 | Stop reason: HOSPADM

## 2021-11-18 RX ORDER — INSULIN GLARGINE 100 [IU]/ML
22 INJECTION, SOLUTION SUBCUTANEOUS NIGHTLY
Status: DISCONTINUED | OUTPATIENT
Start: 2021-11-18 | End: 2021-11-19 | Stop reason: HOSPADM

## 2021-11-18 RX ADMIN — POTASSIUM CHLORIDE 10 MEQ: 10 INJECTION, SOLUTION INTRAVENOUS at 02:32

## 2021-11-18 RX ADMIN — INSULIN LISPRO 2 UNITS: 100 INJECTION, SOLUTION INTRAVENOUS; SUBCUTANEOUS at 17:10

## 2021-11-18 RX ADMIN — INSULIN LISPRO 6 UNITS: 100 INJECTION, SOLUTION INTRAVENOUS; SUBCUTANEOUS at 12:24

## 2021-11-18 RX ADMIN — INSULIN GLARGINE 22 UNITS: 100 INJECTION, SOLUTION SUBCUTANEOUS at 21:03

## 2021-11-18 RX ADMIN — INSULIN LISPRO 5 UNITS: 100 INJECTION, SOLUTION INTRAVENOUS; SUBCUTANEOUS at 17:11

## 2021-11-18 RX ADMIN — INSULIN LISPRO 5 UNITS: 100 INJECTION, SOLUTION INTRAVENOUS; SUBCUTANEOUS at 12:24

## 2021-11-18 RX ADMIN — POTASSIUM CHLORIDE 10 MEQ: 10 INJECTION, SOLUTION INTRAVENOUS at 00:31

## 2021-11-18 RX ADMIN — SODIUM CHLORIDE: 9 INJECTION, SOLUTION INTRAVENOUS at 09:12

## 2021-11-18 RX ADMIN — INSULIN LISPRO 2 UNITS: 100 INJECTION, SOLUTION INTRAVENOUS; SUBCUTANEOUS at 21:02

## 2021-11-18 RX ADMIN — INSULIN LISPRO 6 UNITS: 100 INJECTION, SOLUTION INTRAVENOUS; SUBCUTANEOUS at 08:19

## 2021-11-18 RX ADMIN — SODIUM CHLORIDE: 9 INJECTION, SOLUTION INTRAVENOUS at 17:11

## 2021-11-18 ASSESSMENT — PAIN DESCRIPTION - ORIENTATION
ORIENTATION: LOWER
ORIENTATION: LOWER

## 2021-11-18 ASSESSMENT — PAIN DESCRIPTION - DESCRIPTORS
DESCRIPTORS: ACHING
DESCRIPTORS: ACHING;DISCOMFORT

## 2021-11-18 ASSESSMENT — PAIN DESCRIPTION - LOCATION
LOCATION: BACK
LOCATION: BACK

## 2021-11-18 ASSESSMENT — PAIN DESCRIPTION - PAIN TYPE
TYPE: CHRONIC PAIN
TYPE: CHRONIC PAIN

## 2021-11-18 ASSESSMENT — PAIN SCALES - GENERAL
PAINLEVEL_OUTOF10: 6
PAINLEVEL_OUTOF10: 4

## 2021-11-18 NOTE — ED NOTES
Message sent to Dr. Avtar Villela re diet status. States pt can eat insulin drip can be stopped and pt placed on SQ insulin. Dr. Joie Jay in necessary orders, drip stopped and pt given boxed meal with diet soda.       Minerva Duncan RN  11/18/21 8942

## 2021-11-18 NOTE — PROGRESS NOTES
Hospitalist Progress Note      SYNOPSIS: Patient admitted on 2021 for DKA    Ms. Solomon Mayo, a 27y.o. year old female  who  has a past medical history of Depressive disorder, and DM type 1.    Patient presented to the emergency department with nausea and vomiting. Has a history of type 1 diabetes and has been in DKA before. She thinks she is in DKA now. Also complaining of low back pain and shortness of breath. Initial Laboratory studies reveal BUN 32, anion gap 27, glucose 309, alk phos 149, WBC 15.5 pH 7.23, beta hydroxybutyrate >4.50. Urinalysis shows small leukocyte esterase in the presence of trichomonas. Patient was started on insulin infusion and IV fluids. SUBJECTIVE:    Patient seen and examined  Records reviewed. The pt states that she feels better. She states that she has not missed any of her insulins. Denies any fevers chills. She states she has not eaten breakfast yet but per reports she has been tolerating diet. Stable overnight. No other overnight issues reported. Temp (24hrs), Av °F (36.7 °C), Min:98 °F (36.7 °C), Max:98 °F (36.7 °C)    DIET: ADULT DIET; Regular; 3 carb choices (45 gm/meal)  CODE: Full Code  No intake or output data in the 24 hours ending 21 1110    OBJECTIVE:    /87   Pulse 108   Temp 98 °F (36.7 °C) (Oral)   Resp 11   Wt 100 lb (45.4 kg)   SpO2 99%   BMI 18.89 kg/m²     General appearance: No apparent distress, appears stated age and cooperative. HEENT:  Conjunctivae/corneas clear. Neck: Supple. No jugular venous distention. Respiratory: Clear to auscultation bilaterally, normal respiratory effort  Cardiovascular: Regular rate rhythm, normal S1-S2  Abdomen: Soft, nontender, nondistended  Musculoskeletal: No clubbing, cyanosis, no bilateral lower extremity edema. Brisk capillary refill.    Skin:  No rashes  on visible skin  Neurologic: awake, alert and following commands     ASSESSMENT:    DKA in Type 1 DM  Trichomoniasis  Hypophosphatemia  Hyperkalemia  Leukocytosis-Resolved. PLAN:    Pt Bridged to SQ insulin overnight. Appreciate Endocrinology recommendations. Cont. 16 units SQ nightly and mid grade SSI. Replace Phos  IV fluids and repeat labs. Lovenox for VTE prophylaxis. DISPOSITION: Intermediate. Medications:  REVIEWED DAILY    Infusion Medications    dextrose      sodium chloride 125 mL/hr at 11/18/21 0912    dextrose 5 % and 0.45 % NaCl Stopped (11/18/21 0356)     Scheduled Medications    insulin lispro  0-12 Units SubCUTAneous TID WC    insulin lispro  0-6 Units SubCUTAneous Nightly    enoxaparin  40 mg SubCUTAneous Daily    insulin glargine  16 Units SubCUTAneous Nightly     PRN Meds: glucose, dextrose, glucagon (rDNA), dextrose, dextrose, potassium chloride, magnesium sulfate, polyethylene glycol, dextrose 5 % and 0.45 % NaCl, phosphorus    Labs:     Recent Labs     11/16/21  1932 11/18/21  0709   WBC 15.5* 7.7   HGB 15.5 10.5*   HCT 46.4 31.8*    230       Recent Labs     11/17/21  2157 11/18/21  0223 11/18/21  0617    135 130*   K 4.4 4.8 5.3*    108* 105   CO2 15* 17* 15*   BUN 12 11 11   CREATININE 0.7 0.6 0.7   CALCIUM 8.3* 8.4* 7.7*   PHOS 1.8* 1.6* 2.2*       Recent Labs     11/16/21  2200   PROT 9.0*   ALKPHOS 149*   ALT 45*   AST 25   BILITOT 0.4       No results for input(s): INR in the last 72 hours. No results for input(s): Lillian Risk in the last 72 hours.     Chronic labs:    Lab Results   Component Value Date    CHOL 175 01/24/2018    TRIG 106 01/24/2018    HDL 40 01/24/2018    LDLCALC 114 (H) 01/24/2018    INR 0.9 12/14/2020    LABA1C 14.2 (H) 09/17/2021       Radiology: REVIEWED DAILY    +++++++++++++++++++++++++++++++++++++++++++++++++  Cesar Kramer MD  [employer] Physician - [facility], [place]  +++++++++++++++++++++++++++++++++++++++++++++++++  NOTE: This report was transcribed using voice recognition software. Every effort was made to ensure accuracy; however, inadvertent computerized transcription errors may be present.

## 2021-11-18 NOTE — ED NOTES
Pt up to bathroom and washed up in the bathroom. Pt ate her turkey and bed straightened.  No complaints     Davie Woodruff, PAGE  11/17/21 7269

## 2021-11-19 VITALS
TEMPERATURE: 96.3 F | DIASTOLIC BLOOD PRESSURE: 72 MMHG | HEART RATE: 90 BPM | RESPIRATION RATE: 14 BRPM | WEIGHT: 100 LBS | OXYGEN SATURATION: 100 % | BODY MASS INDEX: 18.89 KG/M2 | SYSTOLIC BLOOD PRESSURE: 116 MMHG

## 2021-11-19 LAB
ANION GAP SERPL CALCULATED.3IONS-SCNC: 12 MMOL/L (ref 7–16)
ANION GAP SERPL CALCULATED.3IONS-SCNC: 13 MMOL/L (ref 7–16)
ANION GAP SERPL CALCULATED.3IONS-SCNC: 13 MMOL/L (ref 7–16)
ANION GAP SERPL CALCULATED.3IONS-SCNC: 14 MMOL/L (ref 7–16)
BUN BLDV-MCNC: 12 MG/DL (ref 6–20)
BUN BLDV-MCNC: 13 MG/DL (ref 6–20)
BUN BLDV-MCNC: 14 MG/DL (ref 6–20)
BUN BLDV-MCNC: 18 MG/DL (ref 6–20)
CALCIUM SERPL-MCNC: 8.4 MG/DL (ref 8.6–10.2)
CALCIUM SERPL-MCNC: 8.5 MG/DL (ref 8.6–10.2)
CALCIUM SERPL-MCNC: 9.2 MG/DL (ref 8.6–10.2)
CALCIUM SERPL-MCNC: 9.4 MG/DL (ref 8.6–10.2)
CHLORIDE BLD-SCNC: 100 MMOL/L (ref 98–107)
CHLORIDE BLD-SCNC: 105 MMOL/L (ref 98–107)
CHLORIDE BLD-SCNC: 105 MMOL/L (ref 98–107)
CHLORIDE BLD-SCNC: 108 MMOL/L (ref 98–107)
CO2: 17 MMOL/L (ref 22–29)
CO2: 19 MMOL/L (ref 22–29)
CO2: 19 MMOL/L (ref 22–29)
CO2: 20 MMOL/L (ref 22–29)
CREAT SERPL-MCNC: 0.5 MG/DL (ref 0.5–1)
CREAT SERPL-MCNC: 0.6 MG/DL (ref 0.5–1)
CREAT SERPL-MCNC: 0.7 MG/DL (ref 0.5–1)
CREAT SERPL-MCNC: 0.8 MG/DL (ref 0.5–1)
GFR AFRICAN AMERICAN: >60
GFR NON-AFRICAN AMERICAN: >60 ML/MIN/1.73
GLUCOSE BLD-MCNC: 122 MG/DL (ref 74–99)
GLUCOSE BLD-MCNC: 129 MG/DL (ref 74–99)
GLUCOSE BLD-MCNC: 155 MG/DL (ref 74–99)
GLUCOSE BLD-MCNC: 624 MG/DL (ref 74–99)
MAGNESIUM: 2 MG/DL (ref 1.6–2.6)
MAGNESIUM: 2 MG/DL (ref 1.6–2.6)
MAGNESIUM: 2.1 MG/DL (ref 1.6–2.6)
MAGNESIUM: 2.2 MG/DL (ref 1.6–2.6)
METER GLUCOSE: 145 MG/DL (ref 74–99)
METER GLUCOSE: 150 MG/DL (ref 74–99)
METER GLUCOSE: 302 MG/DL (ref 74–99)
METER GLUCOSE: 79 MG/DL (ref 74–99)
PHOSPHORUS: 3.1 MG/DL (ref 2.5–4.5)
PHOSPHORUS: 3.3 MG/DL (ref 2.5–4.5)
PHOSPHORUS: 3.6 MG/DL (ref 2.5–4.5)
PHOSPHORUS: 3.6 MG/DL (ref 2.5–4.5)
POTASSIUM SERPL-SCNC: 3.4 MMOL/L (ref 3.5–5)
POTASSIUM SERPL-SCNC: 3.9 MMOL/L (ref 3.5–5)
POTASSIUM SERPL-SCNC: 4 MMOL/L (ref 3.5–5)
POTASSIUM SERPL-SCNC: 4.2 MMOL/L (ref 3.5–5)
SODIUM BLD-SCNC: 130 MMOL/L (ref 132–146)
SODIUM BLD-SCNC: 138 MMOL/L (ref 132–146)
SODIUM BLD-SCNC: 138 MMOL/L (ref 132–146)
SODIUM BLD-SCNC: 139 MMOL/L (ref 132–146)

## 2021-11-19 PROCEDURE — 36415 COLL VENOUS BLD VENIPUNCTURE: CPT

## 2021-11-19 PROCEDURE — 84100 ASSAY OF PHOSPHORUS: CPT

## 2021-11-19 PROCEDURE — 2500000003 HC RX 250 WO HCPCS: Performed by: FAMILY MEDICINE

## 2021-11-19 PROCEDURE — 6370000000 HC RX 637 (ALT 250 FOR IP): Performed by: INTERNAL MEDICINE

## 2021-11-19 PROCEDURE — 6370000000 HC RX 637 (ALT 250 FOR IP): Performed by: FAMILY MEDICINE

## 2021-11-19 PROCEDURE — 99232 SBSQ HOSP IP/OBS MODERATE 35: CPT | Performed by: INTERNAL MEDICINE

## 2021-11-19 PROCEDURE — 83735 ASSAY OF MAGNESIUM: CPT

## 2021-11-19 PROCEDURE — 82962 GLUCOSE BLOOD TEST: CPT

## 2021-11-19 PROCEDURE — 80048 BASIC METABOLIC PNL TOTAL CA: CPT

## 2021-11-19 RX ORDER — ACETAMINOPHEN 325 MG/1
650 TABLET ORAL EVERY 4 HOURS PRN
Status: DISCONTINUED | OUTPATIENT
Start: 2021-11-19 | End: 2021-11-19 | Stop reason: HOSPADM

## 2021-11-19 RX ADMIN — INSULIN LISPRO 7 UNITS: 100 INJECTION, SOLUTION INTRAVENOUS; SUBCUTANEOUS at 16:09

## 2021-11-19 RX ADMIN — INSULIN LISPRO 2 UNITS: 100 INJECTION, SOLUTION INTRAVENOUS; SUBCUTANEOUS at 16:08

## 2021-11-19 RX ADMIN — INSULIN LISPRO 5 UNITS: 100 INJECTION, SOLUTION INTRAVENOUS; SUBCUTANEOUS at 06:12

## 2021-11-19 RX ADMIN — INSULIN LISPRO 8 UNITS: 100 INJECTION, SOLUTION INTRAVENOUS; SUBCUTANEOUS at 06:13

## 2021-11-19 RX ADMIN — INSULIN LISPRO 20 UNITS: 100 INJECTION, SOLUTION INTRAVENOUS; SUBCUTANEOUS at 03:59

## 2021-11-19 RX ADMIN — INSULIN LISPRO 7 UNITS: 100 INJECTION, SOLUTION INTRAVENOUS; SUBCUTANEOUS at 11:46

## 2021-11-19 RX ADMIN — INSULIN LISPRO 2 UNITS: 100 INJECTION, SOLUTION INTRAVENOUS; SUBCUTANEOUS at 11:45

## 2021-11-19 RX ADMIN — METRONIDAZOLE 500 MG: 500 INJECTION, SOLUTION INTRAVENOUS at 09:18

## 2021-11-19 ASSESSMENT — PAIN SCALES - GENERAL
PAINLEVEL_OUTOF10: 0
PAINLEVEL_OUTOF10: 8

## 2021-11-19 ASSESSMENT — PAIN DESCRIPTION - ORIENTATION: ORIENTATION: LOWER

## 2021-11-19 ASSESSMENT — PAIN DESCRIPTION - PAIN TYPE: TYPE: CHRONIC PAIN

## 2021-11-19 ASSESSMENT — PAIN DESCRIPTION - LOCATION: LOCATION: BACK

## 2021-11-19 ASSESSMENT — PAIN DESCRIPTION - DESCRIPTORS: DESCRIPTORS: ACHING

## 2021-11-19 NOTE — PROGRESS NOTES
ENDOCRINOLOGY PROGRESS NOTE   Via Tele-Health SERVICE       Date of admission: 11/16/2021  Date of service: 11/19/2021  Admitting physician: Carlos Kauffman DO   Primary Care Physician: Portillo Dubois DO  Consultant physician: Gabbie Barrera MD     Reason for the consultation:  DM type 1 admitted with DKA     History of Present Illness:  Services were provided through a video synchronous discussion     Gayla Estrada is a 27 y.o. old female with PMH of DM type 1 admitted to [facility] on 11/16/2021 because of N/V and malaise and found to be in DKA, endocrine team was consulted for diabetes management. Subjective   Seen this AM, BG was very this AM due to late night snacks     Inpatient diet:   Carb Restricted diet     Point of care glucose monitoring (Independently reviewed)   Recent Labs     11/18/21  0024 11/18/21  0122 11/18/21  0814 11/18/21  1204 11/18/21  1627 11/18/21  2034 11/19/21  0545 11/19/21  0922   GLUMET 206* 136* 294* 268* 165* 249* 302* 79     Scheduled Meds:   metroNIDAZOLE  500 mg IntraVENous Q8H    insulin lispro  7 Units SubCUTAneous TID WC    insulin lispro  0-12 Units SubCUTAneous TID WC    insulin lispro  0-6 Units SubCUTAneous Nightly    insulin glargine  22 Units SubCUTAneous Nightly    enoxaparin  40 mg SubCUTAneous Daily     PRN Meds:   acetaminophen, 650 mg, Q4H PRN  glucose, 15 g, PRN  dextrose, 12.5 g, PRN  glucagon (rDNA), 1 mg, PRN  dextrose, 100 mL/hr, PRN  dextrose, 12.5 g, PRN  potassium chloride, 10 mEq, PRN  magnesium sulfate, 1,000 mg, PRN  polyethylene glycol, 17 g, Daily PRN  dextrose 5 % and 0.45 % NaCl, , Continuous PRN  phosphorus, 250 mg, PRN      Continuous Infusions:   dextrose      dextrose 5 % and 0.45 % NaCl Stopped (11/18/21 0356)       Review of Systems  All systems reviewed.  All negative except for symptoms mentioned in HPI     OBJECTIVE    /72   Pulse 90   Temp 96.3 °F (35.7 °C) (Temporal)   Resp 14   Wt 100 lb (45.4 kg)   SpO2 100%   BMI 18.89 kg/m²     Intake/Output Summary (Last 24 hours) at 11/19/2021 0957  Last data filed at 11/19/2021 7790  Gross per 24 hour   Intake 1500 ml   Output --   Net 1500 ml     Physical examination:  Due to this being a TeleHealth encounter, evaluation of the following organ systems is limited: Vitals/Constitutional/EENT/Resp/CV/GI//MS/Neuro/Skin/Heme-Lymph-Imm. Modified physical exam through Telemedicine camera    General: Communicating well via camera   Neck: no obvious neck mass. No obvious neck deformity     CVS: no distress   Chest: no distress. Chest is moving with respiration    Extremities:  no visible tremor  Skin: No visible rashes as seen from camera   Musculoskeletal: no visible deformity  Neuro: Alert and oriented to person, place, and time. Psychiatric: Normal mood and affect. Behavior is normal    Review of Laboratory Data:  I personally reviewed the following labs:   Recent Labs     11/16/21  1932 11/18/21  0709   WBC 15.5* 7.7   RBC 5.56* 3.73   HGB 15.5 10.5*   HCT 46.4 31.8*   MCV 83.5 85.3   MCH 27.9 28.2   MCHC 33.4 33.0   RDW 13.8 13.8    230   MPV 11.2 10.8     Recent Labs     11/16/21  2200 11/16/21  2325 11/18/21  2117 11/19/21  0231 11/19/21  0731      < > 134 130* 138   K 4.9   < > 4.0 4.2 3.4*   CL 97*   < > 105 100 105   CO2 11*   < > 18* 17* 19*   BUN 32*   < > 16 18 13   CREATININE 0.9   < > 0.9 0.7 0.6   GLUCOSE 309*   < > 306* 624* 122*   CALCIUM 10.5*   < > 8.7 8.4* 9.2   PROT 9.0*  --   --   --   --    LABALBU 4.8  --   --   --   --    BILITOT 0.4  --   --   --   --    ALKPHOS 149*  --   --   --   --    AST 25  --   --   --   --    ALT 45*  --   --   --   --     < > = values in this interval not displayed.      Beta-Hydroxybutyrate   Date Value Ref Range Status   11/18/2021 0.19 0.02 - 0.27 mmol/L Final   11/17/2021 1.30 (H) 0.02 - 0.27 mmol/L Final   11/16/2021 >4.50 (H) 0.02 - 0.27 mmol/L Final     Lab Results   Component Value Date    LABA1C 14.2 09/17/2021 LABA1C 15.1 07/26/2021    LABA1C 11.2 10/18/2020     No results found for: TSH, T4FREE, I1DKXYJ, FT3, L0EWMBI, TSI, TPOABS, THGAB  Lab Results   Component Value Date    LABA1C 14.2 09/17/2021    GLUCOSE 122 11/19/2021    MALBCR 53.2 08/24/2021    LABMICR 81.9 08/24/2021    LABCREA 154 08/24/2021     Lab Results   Component Value Date    TRIG 106 01/24/2018    HDL 40 01/24/2018    LDLCALC 114 01/24/2018    CHOL 175 01/24/2018       Blood culture   Lab Results   Component Value Date    BC 5 Days no growth 02/24/2021    BC 5 Days no growth 11/10/2020       Radiology:  XR CHEST PORTABLE   Final Result   No acute process. 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 Discussed with patient the importance of eating consistent carbohydrate meals, avoiding high glycemic index food. Also, discussed with patient the risk and negative consequences of dietary noncompliance on blood glucose control, blood pressure and weight  · We recommend following transition regimen   · Lantus 22 U nightly   · Humalog 7 U with meals   · Medium dose sliding scale     · Glucose check before meals and at bed time   · Will titrate insulin dose based on blood glucose trend & insulin requirement    Recurrent DKA due to medications non-compliance   · I have discussed with her the great importance of following the treatment plan exactly as directed in order to achieve a good medical outcome. · Pt understand that she always need insulin.  She was instructed to stop at the office anytime to  free insulin      The above issues were reviewed with the patient who understood and agreed with the plan. Thank you for allowing us to participate in the care of this patient. Please do not hesitate to contact us with any additional questions. Gilbert Varela MD  Endocrinologist, [facility] Diabetes Care and Endocrinology   [address] [place] [address]   Phone: [phone]  Fax: [phone]  --------------------------------------------  An electronic signature was used to authenticate this note.  Keli Durham MD on 11/19/2021 at 9:57 AM

## 2021-11-19 NOTE — FLOWSHEET NOTE
Patient being discharged home with new insulin order. Patient states she understands orders and has all supplies at home. Patients IV removed without complications.

## 2021-11-19 NOTE — CARE COORDINATION
Met with pt to discuss discharge planning/transition of care. Pt is independent, has had issues with insulin/novalog. Reviewing chart, Dr. Aym Ervin offered to come into office for insulin, pt is aware and stated she will do that when needed. Dr. Rudy Garcia is PCP and [facility] is pharmacy. Per pt her grandma will transport home. Jose King, MSW, LSW

## 2021-11-19 NOTE — DISCHARGE SUMMARY
Hospitalist Discharge Summary    Patient ID: Rodney Mcclain   Patient : 1995  Patient's PCP: Chelly Yarbrough DO    Admit Date: 2021   Admitting Physician: Flakita Stokes DO    Discharge Date:  2021  Discharge Physician: Alexsandra Cardenas MD   Discharge Condition: Stable  Discharge Disposition: Home    History of presenting illness:  Ms. Rodney Mcclain, a 27y.o. year old female  who  has a past medical history of Bleeding at insertion site, Common femoral artery injury, right, initial encounter, Depressive disorder, and DM type 1 (diabetes mellitus, type 1) ([facility] [address]    Patient presented to the emergency department with nausea and vomiting. Has a history of type 1 diabetes and has been in DKA before. She thinks she is in DKA now. Also complaining of low back pain and shortness of breath. Denies fever, chills, chest pain, cough. Vital signs within normal limits and stable with the exception of heart rate which is 115. Laboratory studies reveal BUN 32, anion gap 27, glucose 309, alk phos 149, WBC 15.5 pH 7.23, beta hydroxybutyrate >4.50. Urinalysis shows small leukocyte esterase in the presence of trichomonas. Patient was started on insulin infusion and IV fluids and medicine was consulted for admission patient be admitted to the ICU. Hospital course in brief:  (Please refer to daily progress notes for a comprehensive review of the hospitalization by requesting medical records)  Misty Torres, [place] y. o. year old [de-identified]  has a past medical history of Depressive disorder, and DM type 1.    Patient presented to the emergency department with nausea and vomiting.  Has a history of type 1 diabetes and has been in DKA before.  She thinks she is in DKA now. Marysue Goodell complaining of low back pain and shortness of breath.  Initial Laboratory studies reveal BUN 32, anion gap 27, glucose 309, alk phos 149, WBC 15.5 pH 7.23, beta hydroxybutyrate >4.50.  Urinalysis shows small leukocyte esterase in the presence of trichomonas.  Patient was started on insulin infusion and IV fluids. DKA has resolved the patient switched to long-acting insulin per endocrinology. He was cleared for discharge to follow-up with endocrinology as an outpatient    Physical examination:  General appearance: No apparent distress, appears stated age and cooperative. HEENT:  Conjunctivae/corneas clear. Neck: Supple. No jugular venous distention. Respiratory: Clear to auscultation bilaterally, normal respiratory effort  Cardiovascular: Regular rate rhythm, normal S1-S2  Abdomen: Soft, nontender, nondistended  Musculoskeletal: No clubbing, cyanosis, no bilateral lower extremity edema. Brisk capillary refill. Skin:  No rashes  on visible skin  Neurologic: awake, alert and following commands    Consults:   IP CONSULT TO CRITICAL CARE  IP CONSULT TO ENDOCRINOLOGY    Discharge Diagnoses:  Brittle type 1 diabetes mellitus  Recurrent DKA due to medication noncompliance    Discharge Instructions / Follow up: Endocrinology    Continued appropriate risk factor modification of blood pressure, diabetes and serum lipids will remain essential to reducing risk of future atherosclerotic development    Activity: activity as tolerated    Significant labs:  CBC:   Recent Labs     11/16/21  1932 11/18/21  0709   WBC 15.5* 7.7   RBC 5.56* 3.73   HGB 15.5 10.5*   HCT 46.4 31.8*   MCV 83.5 85.3   RDW 13.8 13.8    230     BMP:   Recent Labs     11/19/21  0231 11/19/21  0731 11/19/21  1127   * 138 139   K 4.2 3.4* 4.0    105 108*   CO2 17* 19* 19*   BUN 18 13 12   CREATININE 0.7 0.6 0.5   MG 2.0 2.0 2.1   PHOS 3.3 3.1 3.6     LFT:  Recent Labs     11/16/21  2200   PROT 9.0*   ALKPHOS 149*   ALT 45*   AST 25   BILITOT 0.4     PT/INR: No results for input(s): INR, APTT in the last 72 hours. BNP: No results for input(s): BNP in the last 72 hours.   Hgb A1C:   Lab Results   Component Value Date    LABA1C 14.2 (H) 09/17/2021     Folate and B12: No results found for: HDOYPHYO07, No results found for: FOLATE  Thyroid Studies: No results found for: TSH, I8FQDLK, M6PWZDN, THYROIDAB    Urinalysis:    Lab Results   Component Value Date    NITRU Negative 11/16/2021    WBCUA 1-3 11/16/2021    BACTERIA FEW 11/16/2021    RBCUA 1-3 11/16/2021    BLOODU Negative 11/16/2021    SPECGRAV 1.025 11/16/2021    GLUCOSEU >=1000 11/16/2021       Imaging:  XR CHEST PORTABLE    Result Date: 11/16/2021  EXAMINATION: ONE XRAY VIEW OF THE CHEST 11/16/2021 9:48 pm COMPARISON: 09/17/2021 HISTORY: ORDERING SYSTEM PROVIDED HISTORY: r/o pneumonia TECHNOLOGIST PROVIDED HISTORY: Reason for exam:->r/o pneumonia What reading provider will be dictating this exam?->CRC FINDINGS: The lungs are without acute focal process. There is no effusion or pneumothorax. The cardiomediastinal silhouette is without acute process. The osseous structures are without acute process. No acute process. Discharge Medications:      Medication List      START taking these medications    insulin lispro 100 UNIT/ML injection vial  Commonly known as: HUMALOG  Inject 7 Units into the skin 3 times daily (with meals)        CONTINUE taking these medications    Basaglar KwikPen 100 UNIT/ML injection pen  Generic drug: insulin glargine     NovoLOG FlexPen 100 UNIT/ML injection pen  Generic drug: insulin aspart           Where to Get Your Medications      These medications were sent to [facility] [address] [facility].  Farhana Spence [phone] [employer] [phone] [address] [facility] [place] [facility] [address]    Phone: [phone]   · insulin lispro 100 UNIT/ML injection vial         Time Spent on discharge is more than 35 minutes in the examination, evaluation, counseling and review of medications and discharge plan.    +++++++++++++++++++++++++++++++++++++++++++++++++  Kiera Wilkes MD  [facility] Physician - [address], [facility] [id number] [address]  +++++++++++++++++++++++++++++++++++++++++++++++++  NOTE: This report was transcribed using voice recognition software. Every effort was made to ensure accuracy; however, inadvertent computerized transcription errors may be present.

## 2021-11-19 NOTE — PLAN OF CARE
Problem: Falls - Risk of:  Goal: Will remain free from falls  Description: Will remain free from falls  11/18/2021 2218 by Jazmine Louise RN  Outcome: Met This Shift  11/18/2021 1723 by Alejandro Fajardo RN  Outcome: Met This Shift  Goal: Absence of physical injury  Description: Absence of physical injury  11/18/2021 2218 by Jazmine Louise RN  Outcome: Met This Shift  11/18/2021 1723 by Alejandro Fajardo RN  Outcome: Met This Shift     Problem: Pain:  Goal: Pain level will decrease  Description: Pain level will decrease  11/18/2021 2218 by Jazmine Louise RN  Outcome: Met This Shift  11/18/2021 1723 by Alejandro Fajardo RN  Outcome: Met This Shift  Goal: Control of acute pain  Description: Control of acute pain  11/18/2021 2218 by Jazmine Louise RN  Outcome: Met This Shift  11/18/2021 1723 by Alejandro Fajardo RN  Outcome: Met This Shift  Goal: Control of chronic pain  Description: Control of chronic pain  11/18/2021 2218 by Jazmine Louise RN  Outcome: Met This Shift  11/18/2021 1723 by Alejandro Fajardo RN  Outcome: Met This Shift

## 2021-11-19 NOTE — PROGRESS NOTES
ENDOCRINOLOGY PROGRESS NOTE   Via Tele-Health SERVICE       Date of admission: 11/16/2021  Date of service: 11/18/2021  Admitting physician: Petra Santa DO   Primary Care Physician: Isadora Cano DO  Consultant physician: Aditi Keller MD     Reason for the consultation:  DM type 1 admitted with DKA     History of Present Illness:  Services were provided through a video synchronous discussion     Mj Dixon is a 27 y.o. old female with PMH of DM type 1 admitted to [facility] on 11/16/2021 because of N/V and malaise and found to be in DKA, endocrine team was consulted for diabetes management. Subjective   Seen this M, no acute issues overnight, BG above goal    Inpatient diet:   Carb Restricted diet     Point of care glucose monitoring (Independently reviewed)   Recent Labs     11/17/21  2140 11/17/21  2312 11/18/21  0024 11/18/21  0122 11/18/21  0814 11/18/21  1204 11/18/21  1627 11/18/21  2034   GLUMET 267* 299* 206* 136* 294* 268* 165* 249*     Scheduled Meds:   insulin lispro  0-12 Units SubCUTAneous TID WC    insulin lispro  0-6 Units SubCUTAneous Nightly    insulin lispro  5 Units SubCUTAneous TID WC    insulin glargine  22 Units SubCUTAneous Nightly    enoxaparin  40 mg SubCUTAneous Daily     PRN Meds:   glucose, 15 g, PRN  dextrose, 12.5 g, PRN  glucagon (rDNA), 1 mg, PRN  dextrose, 100 mL/hr, PRN  dextrose, 12.5 g, PRN  potassium chloride, 10 mEq, PRN  magnesium sulfate, 1,000 mg, PRN  polyethylene glycol, 17 g, Daily PRN  dextrose 5 % and 0.45 % NaCl, , Continuous PRN  phosphorus, 250 mg, PRN      Continuous Infusions:   dextrose      sodium chloride 125 mL/hr at 11/18/21 1711    dextrose 5 % and 0.45 % NaCl Stopped (11/18/21 0356)       Review of Systems  All systems reviewed.  All negative except for symptoms mentioned in HPI     OBJECTIVE    BP (!) 130/92   Pulse 108   Temp 98.4 °F (36.9 °C) (Temporal)   Resp 16   Wt 100 lb (45.4 kg)   SpO2 100%   BMI 18.89 kg/m²   No intake or output data in the 24 hours ending 11/18/21 2132  Physical examination:  Due to this being a TeleHealth encounter, evaluation of the following organ systems is limited: Vitals/Constitutional/EENT/Resp/CV/GI//MS/Neuro/Skin/Heme-Lymph-Imm. Modified physical exam through Telemedicine camera    General: Communicating well via camera   Neck: no obvious neck mass. No obvious neck deformity     CVS: no distress   Chest: no distress. Chest is moving with respiration    Extremities:  no visible tremor  Skin: No visible rashes as seen from camera   Musculoskeletal: no visible deformity  Neuro: Alert and oriented to person, place, and time. Psychiatric: Normal mood and affect. Behavior is normal    Review of Laboratory Data:  I personally reviewed the following labs:   Recent Labs     11/16/21  1932 11/18/21  0709   WBC 15.5* 7.7   RBC 5.56* 3.73   HGB 15.5 10.5*   HCT 46.4 31.8*   MCV 83.5 85.3   MCH 27.9 28.2   MCHC 33.4 33.0   RDW 13.8 13.8    230   MPV 11.2 10.8     Recent Labs     11/16/21  2200 11/16/21  2325 11/18/21  1201 11/18/21  1718 11/18/21  1808      < > 139 135 135   K 4.9   < > 4.1 3.9 4.0   CL 97*   < > 108* 105 106   CO2 11*   < > 18* 17* 14*   BUN 32*   < > 9 14 15   CREATININE 0.9   < > 0.6 0.7 0.6   GLUCOSE 309*   < > 278* 294* 285*   CALCIUM 10.5*   < > 9.2 8.8 9.0   PROT 9.0*  --   --   --   --    LABALBU 4.8  --   --   --   --    BILITOT 0.4  --   --   --   --    ALKPHOS 149*  --   --   --   --    AST 25  --   --   --   --    ALT 45*  --   --   --   --     < > = values in this interval not displayed.      Beta-Hydroxybutyrate   Date Value Ref Range Status   11/18/2021 0.19 0.02 - 0.27 mmol/L Final   11/17/2021 1.30 (H) 0.02 - 0.27 mmol/L Final   11/16/2021 >4.50 (H) 0.02 - 0.27 mmol/L Final     Lab Results   Component Value Date    LABA1C 14.2 09/17/2021    LABA1C 15.1 07/26/2021    LABA1C 11.2 10/18/2020     No results found for: TSH, T4FREE, N9WATGZ, FT3, J9DKQKN, TSI, TPOABS, THGAB  Lab Results   Component Value Date    LABA1C 14.2 09/17/2021    GLUCOSE 285 11/18/2021    MALBCR 53.2 08/24/2021    LABMICR 81.9 08/24/2021    LABCREA 154 08/24/2021     Lab Results   Component Value Date    TRIG 106 01/24/2018    HDL 40 01/24/2018    LDLCALC 114 01/24/2018    CHOL 175 01/24/2018       Blood culture   Lab Results   Component Value Date    BC 5 Days no growth 02/24/2021    BC 5 Days no growth 11/10/2020       Radiology:  XR CHEST PORTABLE   Final Result   No acute process. 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 · Pt understand that she always need insulin. She was instructed to stop at the office anytime to  free insulin      The above issues were reviewed with the patient who understood and agreed with the plan. Thank you for allowing us to participate in the care of this patient. Please do not hesitate to contact us with any additional questions.      Tam Ferraro MD  Endocrinologist, [facility]   [address] [facility], [address] [place] [address]   Phone: [phone]  Fax: [phone]  --------------------------------------------  An electronic signature was used to

## 2021-12-21 PROCEDURE — 99283 EMERGENCY DEPT VISIT LOW MDM: CPT

## 2021-12-21 ASSESSMENT — PAIN DESCRIPTION - LOCATION: LOCATION: GENERALIZED;HEAD

## 2021-12-21 ASSESSMENT — PAIN DESCRIPTION - FREQUENCY: FREQUENCY: CONTINUOUS

## 2021-12-21 ASSESSMENT — PAIN DESCRIPTION - DESCRIPTORS: DESCRIPTORS: ACHING;DISCOMFORT;CONSTANT

## 2021-12-21 ASSESSMENT — PAIN SCALES - GENERAL: PAINLEVEL_OUTOF10: 10

## 2021-12-21 ASSESSMENT — PAIN DESCRIPTION - PAIN TYPE: TYPE: ACUTE PAIN

## 2021-12-22 ENCOUNTER — HOSPITAL ENCOUNTER (EMERGENCY)
Age: 26
Discharge: HOME | End: 2021-12-22
Attending: EMERGENCY MEDICINE
Payer: COMMERCIAL

## 2021-12-22 VITALS
BODY MASS INDEX: 20.58 KG/M2 | DIASTOLIC BLOOD PRESSURE: 77 MMHG | HEIGHT: 61 IN | HEART RATE: 99 BPM | RESPIRATION RATE: 14 BRPM | SYSTOLIC BLOOD PRESSURE: 116 MMHG | WEIGHT: 109 LBS | TEMPERATURE: 98.6 F | OXYGEN SATURATION: 100 %

## 2021-12-22 DIAGNOSIS — Z20.822 SUSPECTED 2019 NOVEL CORONAVIRUS INFECTION: Primary | ICD-10-CM

## 2021-12-22 PROCEDURE — U0003 INFECTIOUS AGENT DETECTION BY NUCLEIC ACID (DNA OR RNA); SEVERE ACUTE RESPIRATORY SYNDROME CORONAVIRUS 2 (SARS-COV-2) (CORONAVIRUS DISEASE [COVID-19]), AMPLIFIED PROBE TECHNIQUE, MAKING USE OF HIGH THROUGHPUT TECHNOLOGIES AS DESCRIBED BY CMS-2020-01-R: HCPCS

## 2021-12-22 PROCEDURE — U0005 INFEC AGEN DETEC AMPLI PROBE: HCPCS

## 2021-12-22 RX ORDER — ACETAMINOPHEN 500 MG
500 TABLET ORAL EVERY 4 HOURS PRN
Qty: 20 TABLET | Refills: 0 | Status: SHIPPED | OUTPATIENT
Start: 2021-12-22 | End: 2022-08-15

## 2021-12-22 ASSESSMENT — ENCOUNTER SYMPTOMS
SINUS PAIN: 0
DIARRHEA: 0
BACK PAIN: 0
EYE PAIN: 0
NAUSEA: 0
SORE THROAT: 0
SHORTNESS OF BREATH: 0
ABDOMINAL PAIN: 0
VOMITING: 0

## 2021-12-22 NOTE — ED PROVIDER NOTES
Chief Complaint   Patient presents with    Generalized Body Aches     covid exposure last weekend; denies fever/chills, n/v/d/c    Headache    Dizziness       28-year-old female with past medical history of type 1 diabetes presenting today with generalized body aches for the past 3 days. Symptoms have not been improving or worsening, she has not tried anything for them, not associated with nausea, vomiting, diarrhea. She is having minor headaches. A friend called her today that said they had Covid and she was running last weekend. She does not have any pleuritic chest pain, no pain or swelling in her legs and no fevers or chills. No other acute complaints. Review of Systems   Constitutional: Negative for chills and fever. Generalized body aches   HENT: Negative for ear pain, sinus pain and sore throat. Eyes: Negative for pain. Respiratory: Negative for shortness of breath. Cardiovascular: Negative for chest pain. Gastrointestinal: Negative for abdominal pain, diarrhea, nausea and vomiting. Genitourinary: Negative for flank pain and pelvic pain. Musculoskeletal: Negative for back pain and neck pain. Skin: Negative for rash. Neurological: Positive for headaches. Negative for seizures. Hematological: Negative for adenopathy. All other systems reviewed and are negative. Physical Exam  Vitals and nursing note reviewed. Constitutional:       Appearance: Normal appearance. She is not ill-appearing. HENT:      Head: Normocephalic and atraumatic. Right Ear: External ear normal.      Left Ear: External ear normal.      Nose: Nose normal.      Mouth/Throat:      Mouth: Mucous membranes are moist.      Pharynx: Oropharynx is clear. Eyes:      Conjunctiva/sclera: Conjunctivae normal.      Pupils: Pupils are equal, round, and reactive to light. Cardiovascular:      Rate and Rhythm: Normal rate and regular rhythm. Pulmonary:      Effort: No respiratory distress. Breath sounds: Normal breath sounds. No wheezing. Abdominal:      General: Abdomen is flat. There is no distension. Palpations: Abdomen is soft. Tenderness: There is no abdominal tenderness. Musculoskeletal:         General: No deformity or signs of injury. Cervical back: Neck supple. Skin:     General: Skin is warm and dry. Neurological:      General: No focal deficit present. Mental Status: She is alert. Mental status is at baseline. Procedures     MDM  Number of Diagnoses or Management Options  Suspected 2019 novel coronavirus infection  Diagnosis management comments: 29-year-old female past medical history of type 1 diabetes presented with generalized body aches for the past 2 days. She is hemodynamically stable on arrival to emergency department. As she is young, healthy, vitals are stable no clinical need for lab values. She will get a send out test for Covid and is aware that she will be notified of the results in 2 to 3 days. She was advised to quarantine for the following 10 days. She will be sure to drink plenty of water and use Tylenol and ibuprofen for symptomatic management. She knows to return to the emergency department if anything acutely worsens and will have a follow-up with her PCP.                --------------------------------------------- PAST HISTORY ---------------------------------------------  Past Medical History:  has a past medical history of Bleeding at insertion site, Common femoral artery injury, right, initial encounter, Depressive disorder, Diabetic ketoacidosis ([facility] [address] DM type 1 (diabetes mellitus, type 1) ([facility] [address] Marijuana abuse, Non-compliance, and Trichimoniasis. Past Surgical History:  has a past surgical history that includes Abdomen surgery (N/A, 5/9/2019) and Bartholin gland cyst excision. Social History:  reports that she has never smoked. She has never used smokeless tobacco. She reports previous drug use.  Drug: the importance of follow-up. Discharge Medication List as of 12/22/2021  1:34 AM      START taking these medications    Details   acetaminophen (TYLENOL) 500 MG tablet Take 1 tablet by mouth every 4 hours as needed for Pain or Fever, Disp-20 tablet, R-0Print             Diagnosis:  1. Suspected 2019 novel coronavirus infection        Disposition:  Patient's disposition: Discharge to home  Patient's condition is stable.            Jessica Horton, DO  Resident  12/22/21 3833

## 2021-12-23 LAB — SARS-COV-2, PCR: NOT DETECTED

## 2021-12-27 ENCOUNTER — VIRTUAL VISIT (OUTPATIENT)
Dept: ENDOCRINOLOGY | Age: 26
End: 2021-12-27
Payer: COMMERCIAL

## 2021-12-27 DIAGNOSIS — E10.65 TYPE 1 DIABETES MELLITUS WITH HYPERGLYCEMIA (HCC): ICD-10-CM

## 2021-12-27 DIAGNOSIS — Z91.199 HISTORY OF NONCOMPLIANCE WITH MEDICAL TREATMENT: ICD-10-CM

## 2021-12-27 DIAGNOSIS — Z91.119 DIETARY NONCOMPLIANCE: Primary | ICD-10-CM

## 2021-12-27 DIAGNOSIS — E55.9 VITAMIN D DEFICIENCY: ICD-10-CM

## 2021-12-27 PROCEDURE — 1036F TOBACCO NON-USER: CPT | Performed by: INTERNAL MEDICINE

## 2021-12-27 PROCEDURE — G8484 FLU IMMUNIZE NO ADMIN: HCPCS | Performed by: INTERNAL MEDICINE

## 2021-12-27 PROCEDURE — G8427 DOCREV CUR MEDS BY ELIG CLIN: HCPCS | Performed by: INTERNAL MEDICINE

## 2021-12-27 PROCEDURE — 2022F DILAT RTA XM EVC RTNOPTHY: CPT | Performed by: INTERNAL MEDICINE

## 2021-12-27 PROCEDURE — G8420 CALC BMI NORM PARAMETERS: HCPCS | Performed by: INTERNAL MEDICINE

## 2021-12-27 PROCEDURE — 99214 OFFICE O/P EST MOD 30 MIN: CPT | Performed by: INTERNAL MEDICINE

## 2021-12-27 NOTE — PROGRESS NOTES
[address] [facility] Department of Endocrinology Diabetes and Metabolism   [address] [facility] [address]   Phone: [phone]  Fax: [phone]    Date of Service: 12/27/2021  Primary Care Physician: Jyotsna June DO. Provider: Radha Parisi MD    Reason for the visit:  Type 1 DM, vitD deficiency      History of Present Illness: The history is provided by the patient. No  was used. Accuracy of the patient data is excellent. Lino Pruitt is a very pleasant 27 y.o. female seen today for follow up visit     Lino Pruitt was diagnosed with diabetes at age 8   Has insulin pump at home but she doesn't like it  Lantus 22 units at night, humalog 1:10 + ss 1:50>150  A1c consistently >10%   Pt was recently admitted to the hospital 4 weeks ago  with DKA and since discharge BG has been doing better   The patient has been checking BS  Multiple times a day and readings are better since recent hospitalization  She didn't send us sugar log today   Lab Results   Component Value Date    LABA1C 14.2 09/17/2021    LABA1C 15.1 07/26/2021    LABA1C 11.2 10/18/2020    LABA1C 13.0 05/30/2020     Her diabetes complicated with gastroparesis   I reviewed current medications and the patient has no issues with diabetes medications  Lino Pruitt is due for eye exam   The patient performs her own feet care and doesn't see podiatry service   Microvascular complications: + Neuropathy. No Retinopathy, No Nephropathy   Macrovascular complications: no CAD, PVD, or Stroke  The patient receives Flushot every year. She has not received the pneumonia vaccine.     PAST MEDICAL HISTORY   Past Medical History:   Diagnosis Date    Bleeding at insertion site 01/05/2018    Common femoral artery injury, right, initial encounter 01/05/2018    Depressive disorder     Diabetic ketoacidosis ([address]     DM type 1 (diabetes mellitus, type 1) ([address]     Marijuana abuse     Non-compliance     Trichimoniasis 11/19/2021     PAST SURGICAL HISTORY   Past Surgical History:   Procedure Laterality Date    ABDOMEN SURGERY N/A 5/9/2019    INCISION AND DRAINAGE OF SUPRAPUBIC ABSESS performed by Marla Lino MD at [address] [place] [address]      last yr     SOCIAL HISTORY   Tobacco:   reports that she has never smoked. She has never used smokeless tobacco.  Alcol:   reports no history of alcohol use. Illicit Drugs:   reports previous drug use. Drug: Marijuana Maurita Handsome). FAMILY HISTORY   Family History   Problem Relation Age of Onset    Diabetes Maternal Grandmother     Diabetes Paternal Grandmother     Stroke Other     Thyroid Disease Neg Hx      ALLERGIES AND DRUG REACTIONS   No Known Allergies    CURRENT MEDICATIONS     Current Outpatient Medications   Medication Sig Dispense Refill    acetaminophen (TYLENOL) 500 MG tablet Take 1 tablet by mouth every 4 hours as needed for Pain or Fever 20 tablet 0    insulin lispro (HUMALOG) 100 UNIT/ML injection vial Inject 7 Units into the skin 3 times daily (with meals) 10 mL 3    insulin glargine (BASAGLAR KWIKPEN) 100 UNIT/ML injection pen Inject 22 Units into the skin nightly       No current facility-administered medications for this visit. Review of Systems  Constitutional: No fever, no chills, no diaphoresis, no generalized weakness. HEENT: No blurred vision, No sore throat, no ear pain, no hair loss  Neck: denied any neck swelling, difficulty swallowing,   Cardio-pulmonary: No CP, SOB or palpitation, No orthopnea or PND. No cough or wheezing. GI: No N/V/D, no constipation, No abdominal pain, no melena or hematochezia   : Denied any dysuria, hematuria, flank pain, discharge, or incontinence. Skin: denied any rash, ulcer, Hirsute, or hyperpigmentation. MSK: denied any joint deformity, joint pain/swelling, muscle pain, or back pain. Neuro: + numbness and tingling in lower extremities.      OBJECTIVE    There were no vitals taken for this visit. BP Readings from Last 4 Encounters:   12/22/21 116/77   11/19/21 116/72   09/18/21 106/62   09/13/21 132/72     Wt Readings from Last 6 Encounters:   12/21/21 109 lb (49.4 kg)   11/16/21 100 lb (45.4 kg)   09/18/21 99 lb 4.8 oz (45 kg)   08/24/21 102 lb (46.3 kg)   07/25/21 127 lb 10.3 oz (57.9 kg)   02/24/21 84 lb 14 oz (38.5 kg)     Physical examination:  Due to this being a TeleHealth encounter, evaluation of the following organ systems is limited: Vitals/Constitutional/EENT/Resp/CV/GI//MS/Neuro/Skin/Heme-Lymph-Imm. Modified physical exam through Telemedicine camera    General: Communicating well via camera   Neck: no obvious neck mass. No obvious neck deformity     CVS: no distress   Chest: no distress. Chest is moving with respiration    Extremities:  no visible tremor  Skin: No visible rashes as seen from camera   Musculoskeletal: no visible deformity  Neuro: Alert and oriented to person, place, and time. Psychiatric: Normal mood and affect.  Behavior is normal    Review of Laboratory Data:  I have reviewed the following:  Lab Results   Component Value Date/Time    WBC 7.7 11/18/2021 07:09 AM    RBC 3.73 11/18/2021 07:09 AM    HGB 10.5 (L) 11/18/2021 07:09 AM    HCT 31.8 (L) 11/18/2021 07:09 AM    MCV 85.3 11/18/2021 07:09 AM    MCH 28.2 11/18/2021 07:09 AM    MCHC 33.0 11/18/2021 07:09 AM    RDW 13.8 11/18/2021 07:09 AM     11/18/2021 07:09 AM    MPV 10.8 11/18/2021 07:09 AM      Lab Results   Component Value Date/Time     11/19/2021 02:55 PM    K 3.9 11/19/2021 02:55 PM    K 4.9 11/16/2021 10:00 PM    CO2 20 (L) 11/19/2021 02:55 PM    BUN 14 11/19/2021 02:55 PM    CREATININE 0.8 11/19/2021 02:55 PM    CALCIUM 9.4 11/19/2021 02:55 PM    LABGLOM >60 11/19/2021 02:55 PM    GFRAA >60 11/19/2021 02:55 PM      No results found for: TSH, T4FREE, U2PDPJI, FT3, K3PHMSK, TSI, TPOABS, THGAB  Lab Results   Component Value Date    LABA1C 14.2 09/17/2021    GLUCOSE 129 11/19/2021    MALBCR 53.2 08/24/2021    LABMICR 81.9 08/24/2021    LABCREA 154 08/24/2021     Lab Results   Component Value Date    CHOL 175 01/24/2018    TRIG 106 01/24/2018    HDL 40 01/24/2018     No results found for: [facility]   Rodney Mcclain, a 27 y.o.-old female seen in for the following issues     Diabetes Mellitus Type 1    · Pt's DM is uncontrolled due to poor compliance with diabetic diet and missing insulin. A1c consistently >10%   · Doing better since recent hospitalization  · Continue Lantus 22 units at night, humalog 1:10 + ss 2:50>150   · Pt to bring her BG readings in few days for review   · Discussed with patient A1c and blood sugar goals   · Patient will need routine diabetes maintenance and prevention  · A1c at next OV     Dietary noncompliance   Discussed with patient the importance of eating consistent carbohydrate meals, avoiding high glycemic index food. Also, discussed with patient the risk and negative consequences of dietary noncompliance on blood glucose control, blood pressure and weight    vitD deficiency   · Continue vitD supplement     I personally reviewed external notes from PCP and other patient's care team providers, and personally interpreted labs associated with the above diagnosis. I also ordered labs to further assess and manage the above addressed medical conditions    Return in about 6 weeks (around 2/7/2022) for DM type 1. The above issues were reviewed with the patient who understood and agreed with the plan. Thank you for allowing us to participate in the care of this patient. Please do not hesitate to contact us with any additional questions. Diagnosis Orders   1. Dietary noncompliance     2. Type 1 diabetes mellitus with hyperglycemia (HCC)  Hemoglobin A1C   3. History of noncompliance with medical treatment     4.  Vitamin D deficiency       Leola Hauser MD  Endocrinologist, [facility])   [address] [facility] [address]   Phone: [phone]  Fax: [phone]  -------------------------   An electronic signature was used to authenticate this note. Myrtle Kennedy MD on 12/27/2021 at 3:43 PM  This visit was performed as a virtual video visit using a synchronous, two-way, audio-video telehealth technology platform  This Virtual  Visit was conducted, with patient's consent, to reduce the patient's risk of exposure to COVID-19 and provide continuity of care.

## 2022-01-07 ENCOUNTER — APPOINTMENT (OUTPATIENT)
Dept: GENERAL RADIOLOGY | Age: 27
DRG: 420 | End: 2022-01-07
Payer: COMMERCIAL

## 2022-01-07 ENCOUNTER — HOSPITAL ENCOUNTER (INPATIENT)
Age: 27
LOS: 2 days | Discharge: HOME | DRG: 420 | End: 2022-01-09
Attending: EMERGENCY MEDICINE | Admitting: INTERNAL MEDICINE
Payer: COMMERCIAL

## 2022-01-07 DIAGNOSIS — E10.10 DIABETIC KETOACIDOSIS WITHOUT COMA ASSOCIATED WITH TYPE 1 DIABETES MELLITUS (HCC): Primary | ICD-10-CM

## 2022-01-07 LAB
BASOPHILS ABSOLUTE: 0.04 E9/L (ref 0–0.2)
BASOPHILS RELATIVE PERCENT: 0.4 % (ref 0–2)
EOSINOPHILS ABSOLUTE: 0.01 E9/L (ref 0.05–0.5)
EOSINOPHILS RELATIVE PERCENT: 0.1 % (ref 0–6)
HCT VFR BLD CALC: 48.1 % (ref 34–48)
HEMOGLOBIN: 14.7 G/DL (ref 11.5–15.5)
IMMATURE GRANULOCYTES #: 0.03 E9/L
IMMATURE GRANULOCYTES %: 0.3 % (ref 0–5)
LACTIC ACID, SEPSIS: 1.5 MMOL/L (ref 0.5–1.9)
LYMPHOCYTES ABSOLUTE: 3.73 E9/L (ref 1.5–4)
LYMPHOCYTES RELATIVE PERCENT: 39.3 % (ref 20–42)
MCH RBC QN AUTO: 28 PG (ref 26–35)
MCHC RBC AUTO-ENTMCNC: 30.6 % (ref 32–34.5)
MCV RBC AUTO: 91.6 FL (ref 80–99.9)
MONOCYTES ABSOLUTE: 0.37 E9/L (ref 0.1–0.95)
MONOCYTES RELATIVE PERCENT: 3.9 % (ref 2–12)
NEUTROPHILS ABSOLUTE: 5.31 E9/L (ref 1.8–7.3)
NEUTROPHILS RELATIVE PERCENT: 56 % (ref 43–80)
PDW BLD-RTO: 12.4 FL (ref 11.5–15)
PH VENOUS: 7.21 (ref 7.35–7.45)
PLATELET # BLD: 334 E9/L (ref 130–450)
PMV BLD AUTO: 10.7 FL (ref 7–12)
RBC # BLD: 5.25 E12/L (ref 3.5–5.5)
REASON FOR REJECTION: NORMAL
REJECTED TEST: NORMAL
WBC # BLD: 9.5 E9/L (ref 4.5–11.5)

## 2022-01-07 PROCEDURE — 83605 ASSAY OF LACTIC ACID: CPT

## 2022-01-07 PROCEDURE — 71045 X-RAY EXAM CHEST 1 VIEW: CPT

## 2022-01-07 PROCEDURE — 82800 BLOOD PH: CPT

## 2022-01-07 PROCEDURE — 85025 COMPLETE CBC W/AUTO DIFF WBC: CPT

## 2022-01-07 PROCEDURE — 2000000000 HC ICU R&B

## 2022-01-07 PROCEDURE — 99283 EMERGENCY DEPT VISIT LOW MDM: CPT

## 2022-01-07 PROCEDURE — 87635 SARS-COV-2 COVID-19 AMP PRB: CPT

## 2022-01-07 RX ORDER — SODIUM CHLORIDE 0.9 % (FLUSH) 0.9 %
5-40 SYRINGE (ML) INJECTION EVERY 12 HOURS SCHEDULED
Status: CANCELLED | OUTPATIENT
Start: 2022-01-07

## 2022-01-07 RX ORDER — SODIUM CHLORIDE 0.9 % (FLUSH) 0.9 %
5-40 SYRINGE (ML) INJECTION PRN
Status: CANCELLED | OUTPATIENT
Start: 2022-01-07

## 2022-01-07 RX ORDER — ONDANSETRON 2 MG/ML
4 INJECTION INTRAMUSCULAR; INTRAVENOUS EVERY 6 HOURS PRN
Status: CANCELLED | OUTPATIENT
Start: 2022-01-07

## 2022-01-07 RX ORDER — POTASSIUM CHLORIDE 7.45 MG/ML
10 INJECTION INTRAVENOUS PRN
Status: CANCELLED | OUTPATIENT
Start: 2022-01-07

## 2022-01-07 RX ORDER — MAGNESIUM SULFATE IN WATER 40 MG/ML
2000 INJECTION, SOLUTION INTRAVENOUS PRN
Status: CANCELLED | OUTPATIENT
Start: 2022-01-07

## 2022-01-07 RX ORDER — ONDANSETRON 4 MG/1
4 TABLET, ORALLY DISINTEGRATING ORAL EVERY 8 HOURS PRN
Status: CANCELLED | OUTPATIENT
Start: 2022-01-07

## 2022-01-07 RX ORDER — POLYETHYLENE GLYCOL 3350 17 G/17G
17 POWDER, FOR SOLUTION ORAL DAILY PRN
Status: CANCELLED | OUTPATIENT
Start: 2022-01-07

## 2022-01-07 RX ORDER — 0.9 % SODIUM CHLORIDE 0.9 %
1000 INTRAVENOUS SOLUTION INTRAVENOUS ONCE
Status: COMPLETED | OUTPATIENT
Start: 2022-01-07 | End: 2022-01-08

## 2022-01-07 ASSESSMENT — PAIN DESCRIPTION - LOCATION: LOCATION: BACK

## 2022-01-07 ASSESSMENT — PAIN SCALES - GENERAL: PAINLEVEL_OUTOF10: 10

## 2022-01-07 NOTE — Clinical Note
Patient Class: Inpatient [101]   REQUIRED: Diagnosis: DKA, type 1, not at goal [facility]) [[id number]]   Estimated Length of Stay: Estimated stay of more than 2 midnights

## 2022-01-08 LAB
ALBUMIN SERPL-MCNC: 3.7 G/DL (ref 3.5–5.2)
ALP BLD-CCNC: 109 U/L (ref 35–104)
ALT SERPL-CCNC: 26 U/L (ref 0–32)
ANION GAP SERPL CALCULATED.3IONS-SCNC: 13 MMOL/L (ref 7–16)
ANION GAP SERPL CALCULATED.3IONS-SCNC: 27 MMOL/L (ref 7–16)
AST SERPL-CCNC: 27 U/L (ref 0–31)
BASOPHILS ABSOLUTE: 0.04 E9/L (ref 0–0.2)
BASOPHILS RELATIVE PERCENT: 0.5 % (ref 0–2)
BETA-HYDROXYBUTYRATE: >4.5 MMOL/L (ref 0.02–0.27)
BILIRUB SERPL-MCNC: 0.2 MG/DL (ref 0–1.2)
BUN BLDV-MCNC: 14 MG/DL (ref 6–20)
BUN BLDV-MCNC: 19 MG/DL (ref 6–20)
CALCIUM SERPL-MCNC: 8.4 MG/DL (ref 8.6–10.2)
CALCIUM SERPL-MCNC: 8.6 MG/DL (ref 8.6–10.2)
CHLORIDE BLD-SCNC: 106 MMOL/L (ref 98–107)
CHLORIDE BLD-SCNC: 99 MMOL/L (ref 98–107)
CHOLESTEROL, TOTAL: 244 MG/DL (ref 0–199)
CO2: 16 MMOL/L (ref 22–29)
CO2: 7 MMOL/L (ref 22–29)
CREAT SERPL-MCNC: 0.5 MG/DL (ref 0.5–1)
CREAT SERPL-MCNC: 0.6 MG/DL (ref 0.5–1)
EKG ATRIAL RATE: 105 BPM
EKG P AXIS: 67 DEGREES
EKG P-R INTERVAL: 150 MS
EKG Q-T INTERVAL: 346 MS
EKG QRS DURATION: 68 MS
EKG QTC CALCULATION (BAZETT): 457 MS
EKG R AXIS: 69 DEGREES
EKG T AXIS: 52 DEGREES
EKG VENTRICULAR RATE: 105 BPM
EOSINOPHILS ABSOLUTE: 0.06 E9/L (ref 0.05–0.5)
EOSINOPHILS RELATIVE PERCENT: 0.7 % (ref 0–6)
GFR AFRICAN AMERICAN: >60
GFR AFRICAN AMERICAN: >60
GFR NON-AFRICAN AMERICAN: >60 ML/MIN/1.73
GFR NON-AFRICAN AMERICAN: >60 ML/MIN/1.73
GLUCOSE BLD-MCNC: 147 MG/DL (ref 74–99)
GLUCOSE BLD-MCNC: 235 MG/DL (ref 74–99)
HBA1C MFR BLD: 16.8 % (ref 4–5.6)
HCT VFR BLD CALC: 35.8 % (ref 34–48)
HDLC SERPL-MCNC: 48 MG/DL
HEMOGLOBIN: 12.1 G/DL (ref 11.5–15.5)
IMMATURE GRANULOCYTES #: 0.01 E9/L
IMMATURE GRANULOCYTES %: 0.1 % (ref 0–5)
LDL CHOLESTEROL CALCULATED: 129 MG/DL (ref 0–99)
LYMPHOCYTES ABSOLUTE: 3.87 E9/L (ref 1.5–4)
LYMPHOCYTES RELATIVE PERCENT: 46.8 % (ref 20–42)
MAGNESIUM: 1.8 MG/DL (ref 1.6–2.6)
MAGNESIUM: 1.9 MG/DL (ref 1.6–2.6)
MCH RBC QN AUTO: 28.4 PG (ref 26–35)
MCHC RBC AUTO-ENTMCNC: 33.8 % (ref 32–34.5)
MCV RBC AUTO: 84 FL (ref 80–99.9)
METER GLUCOSE: 110 MG/DL (ref 74–99)
METER GLUCOSE: 119 MG/DL (ref 74–99)
METER GLUCOSE: 131 MG/DL (ref 74–99)
METER GLUCOSE: 138 MG/DL (ref 74–99)
METER GLUCOSE: 160 MG/DL (ref 74–99)
METER GLUCOSE: 162 MG/DL (ref 74–99)
METER GLUCOSE: 163 MG/DL (ref 74–99)
METER GLUCOSE: 173 MG/DL (ref 74–99)
METER GLUCOSE: 99 MG/DL (ref 74–99)
MONOCYTES ABSOLUTE: 0.39 E9/L (ref 0.1–0.95)
MONOCYTES RELATIVE PERCENT: 4.7 % (ref 2–12)
NEUTROPHILS ABSOLUTE: 3.9 E9/L (ref 1.8–7.3)
NEUTROPHILS RELATIVE PERCENT: 47.2 % (ref 43–80)
PDW BLD-RTO: 12.2 FL (ref 11.5–15)
PHOSPHORUS: 2.3 MG/DL (ref 2.5–4.5)
PHOSPHORUS: 3.3 MG/DL (ref 2.5–4.5)
PLATELET # BLD: 335 E9/L (ref 130–450)
PMV BLD AUTO: 10.7 FL (ref 7–12)
POTASSIUM REFLEX MAGNESIUM: 4.1 MMOL/L (ref 3.5–5)
POTASSIUM SERPL-SCNC: 3.7 MMOL/L (ref 3.5–5)
RBC # BLD: 4.26 E12/L (ref 3.5–5.5)
SARS-COV-2, NAAT: NOT DETECTED
SODIUM BLD-SCNC: 133 MMOL/L (ref 132–146)
SODIUM BLD-SCNC: 135 MMOL/L (ref 132–146)
TOTAL PROTEIN: 7 G/DL (ref 6.4–8.3)
TRIGL SERPL-MCNC: 337 MG/DL (ref 0–149)
TROPONIN, HIGH SENSITIVITY: 7 NG/L (ref 0–9)
TSH SERPL DL<=0.05 MIU/L-ACNC: 0.49 UIU/ML (ref 0.27–4.2)
VLDLC SERPL CALC-MCNC: 67 MG/DL
WBC # BLD: 8.3 E9/L (ref 4.5–11.5)

## 2022-01-08 PROCEDURE — 84484 ASSAY OF TROPONIN QUANT: CPT

## 2022-01-08 PROCEDURE — 2500000003 HC RX 250 WO HCPCS: Performed by: INTERNAL MEDICINE

## 2022-01-08 PROCEDURE — 2580000003 HC RX 258: Performed by: STUDENT IN AN ORGANIZED HEALTH CARE EDUCATION/TRAINING PROGRAM

## 2022-01-08 PROCEDURE — 80053 COMPREHEN METABOLIC PANEL: CPT

## 2022-01-08 PROCEDURE — 36415 COLL VENOUS BLD VENIPUNCTURE: CPT

## 2022-01-08 PROCEDURE — 2580000003 HC RX 258: Performed by: INTERNAL MEDICINE

## 2022-01-08 PROCEDURE — 6360000002 HC RX W HCPCS: Performed by: INTERNAL MEDICINE

## 2022-01-08 PROCEDURE — 85025 COMPLETE CBC W/AUTO DIFF WBC: CPT

## 2022-01-08 PROCEDURE — 6370000000 HC RX 637 (ALT 250 FOR IP): Performed by: STUDENT IN AN ORGANIZED HEALTH CARE EDUCATION/TRAINING PROGRAM

## 2022-01-08 PROCEDURE — 99233 SBSQ HOSP IP/OBS HIGH 50: CPT | Performed by: INTERNAL MEDICINE

## 2022-01-08 PROCEDURE — 99223 1ST HOSP IP/OBS HIGH 75: CPT | Performed by: INTERNAL MEDICINE

## 2022-01-08 PROCEDURE — 87081 CULTURE SCREEN ONLY: CPT

## 2022-01-08 PROCEDURE — 84443 ASSAY THYROID STIM HORMONE: CPT

## 2022-01-08 PROCEDURE — 82962 GLUCOSE BLOOD TEST: CPT

## 2022-01-08 PROCEDURE — 93005 ELECTROCARDIOGRAM TRACING: CPT | Performed by: STUDENT IN AN ORGANIZED HEALTH CARE EDUCATION/TRAINING PROGRAM

## 2022-01-08 PROCEDURE — 83735 ASSAY OF MAGNESIUM: CPT

## 2022-01-08 PROCEDURE — 80061 LIPID PANEL: CPT

## 2022-01-08 PROCEDURE — 80048 BASIC METABOLIC PNL TOTAL CA: CPT

## 2022-01-08 PROCEDURE — 84100 ASSAY OF PHOSPHORUS: CPT

## 2022-01-08 PROCEDURE — 93010 ELECTROCARDIOGRAM REPORT: CPT | Performed by: INTERNAL MEDICINE

## 2022-01-08 PROCEDURE — 6370000000 HC RX 637 (ALT 250 FOR IP): Performed by: INTERNAL MEDICINE

## 2022-01-08 PROCEDURE — 82010 KETONE BODYS QUAN: CPT

## 2022-01-08 PROCEDURE — 1200000000 HC SEMI PRIVATE

## 2022-01-08 PROCEDURE — 83036 HEMOGLOBIN GLYCOSYLATED A1C: CPT

## 2022-01-08 RX ORDER — DEXTROSE AND SODIUM CHLORIDE 5; .45 G/100ML; G/100ML
INJECTION, SOLUTION INTRAVENOUS CONTINUOUS PRN
Status: DISCONTINUED | OUTPATIENT
Start: 2022-01-08 | End: 2022-01-08

## 2022-01-08 RX ORDER — DEXTROSE MONOHYDRATE 25 G/50ML
12.5 INJECTION, SOLUTION INTRAVENOUS PRN
Status: DISCONTINUED | OUTPATIENT
Start: 2022-01-08 | End: 2022-01-09 | Stop reason: HOSPADM

## 2022-01-08 RX ORDER — MAGNESIUM SULFATE 1 G/100ML
1000 INJECTION INTRAVENOUS PRN
Status: DISCONTINUED | OUTPATIENT
Start: 2022-01-08 | End: 2022-01-08 | Stop reason: SDUPTHER

## 2022-01-08 RX ORDER — KETOROLAC TROMETHAMINE 30 MG/ML
15 INJECTION, SOLUTION INTRAMUSCULAR; INTRAVENOUS EVERY 6 HOURS PRN
Status: DISCONTINUED | OUTPATIENT
Start: 2022-01-08 | End: 2022-01-09 | Stop reason: HOSPADM

## 2022-01-08 RX ORDER — 0.9 % SODIUM CHLORIDE 0.9 %
1000 INTRAVENOUS SOLUTION INTRAVENOUS ONCE
Status: DISCONTINUED | OUTPATIENT
Start: 2022-01-08 | End: 2022-01-09 | Stop reason: HOSPADM

## 2022-01-08 RX ORDER — DEXTROSE AND SODIUM CHLORIDE 5; .45 G/100ML; G/100ML
INJECTION, SOLUTION INTRAVENOUS CONTINUOUS PRN
Status: DISCONTINUED | OUTPATIENT
Start: 2022-01-08 | End: 2022-01-08 | Stop reason: SDUPTHER

## 2022-01-08 RX ORDER — INSULIN GLARGINE 100 [IU]/ML
22 INJECTION, SOLUTION SUBCUTANEOUS NIGHTLY
Status: DISCONTINUED | OUTPATIENT
Start: 2022-01-08 | End: 2022-01-09

## 2022-01-08 RX ORDER — SODIUM CHLORIDE 0.9 % (FLUSH) 0.9 %
SYRINGE (ML) INJECTION
Status: DISPENSED
Start: 2022-01-08 | End: 2022-01-08

## 2022-01-08 RX ORDER — ACETAMINOPHEN 650 MG/1
650 SUPPOSITORY RECTAL EVERY 6 HOURS PRN
Status: DISCONTINUED | OUTPATIENT
Start: 2022-01-08 | End: 2022-01-09 | Stop reason: HOSPADM

## 2022-01-08 RX ORDER — SODIUM CHLORIDE 450 MG/100ML
INJECTION, SOLUTION INTRAVENOUS CONTINUOUS
Status: CANCELLED | OUTPATIENT
Start: 2022-01-08

## 2022-01-08 RX ORDER — MAGNESIUM SULFATE 1 G/100ML
1000 INJECTION INTRAVENOUS PRN
Status: DISCONTINUED | OUTPATIENT
Start: 2022-01-08 | End: 2022-01-09 | Stop reason: HOSPADM

## 2022-01-08 RX ORDER — POTASSIUM CHLORIDE 7.45 MG/ML
10 INJECTION INTRAVENOUS PRN
Status: DISCONTINUED | OUTPATIENT
Start: 2022-01-08 | End: 2022-01-09 | Stop reason: HOSPADM

## 2022-01-08 RX ORDER — POLYETHYLENE GLYCOL 3350 17 G/17G
17 POWDER, FOR SOLUTION ORAL DAILY PRN
Status: DISCONTINUED | OUTPATIENT
Start: 2022-01-08 | End: 2022-01-09 | Stop reason: HOSPADM

## 2022-01-08 RX ORDER — MORPHINE SULFATE 2 MG/ML
2 INJECTION, SOLUTION INTRAMUSCULAR; INTRAVENOUS ONCE
Status: COMPLETED | OUTPATIENT
Start: 2022-01-08 | End: 2022-01-08

## 2022-01-08 RX ORDER — POTASSIUM CHLORIDE AND SODIUM CHLORIDE 450; 150 MG/100ML; MG/100ML
INJECTION, SOLUTION INTRAVENOUS CONTINUOUS
Status: DISCONTINUED | OUTPATIENT
Start: 2022-01-08 | End: 2022-01-08

## 2022-01-08 RX ORDER — POTASSIUM CHLORIDE 7.45 MG/ML
10 INJECTION INTRAVENOUS PRN
Status: DISCONTINUED | OUTPATIENT
Start: 2022-01-08 | End: 2022-01-08 | Stop reason: SDUPTHER

## 2022-01-08 RX ORDER — SODIUM CHLORIDE 450 MG/100ML
INJECTION, SOLUTION INTRAVENOUS CONTINUOUS
Status: DISCONTINUED | OUTPATIENT
Start: 2022-01-08 | End: 2022-01-08

## 2022-01-08 RX ORDER — SODIUM CHLORIDE 9 MG/ML
INJECTION, SOLUTION INTRAVENOUS CONTINUOUS
Status: DISCONTINUED | OUTPATIENT
Start: 2022-01-08 | End: 2022-01-08

## 2022-01-08 RX ORDER — SODIUM CHLORIDE 9 MG/ML
25 INJECTION, SOLUTION INTRAVENOUS PRN
Status: DISCONTINUED | OUTPATIENT
Start: 2022-01-08 | End: 2022-01-09 | Stop reason: HOSPADM

## 2022-01-08 RX ORDER — ACETAMINOPHEN 325 MG/1
650 TABLET ORAL EVERY 6 HOURS PRN
Status: DISCONTINUED | OUTPATIENT
Start: 2022-01-08 | End: 2022-01-09 | Stop reason: HOSPADM

## 2022-01-08 RX ORDER — DEXTROSE MONOHYDRATE 25 G/50ML
12.5 INJECTION, SOLUTION INTRAVENOUS PRN
Status: DISCONTINUED | OUTPATIENT
Start: 2022-01-08 | End: 2022-01-08 | Stop reason: SDUPTHER

## 2022-01-08 RX ADMIN — DEXTROSE AND SODIUM CHLORIDE: 5; 450 INJECTION, SOLUTION INTRAVENOUS at 11:06

## 2022-01-08 RX ADMIN — INSULIN LISPRO 2 UNITS: 100 INJECTION, SOLUTION INTRAVENOUS; SUBCUTANEOUS at 13:07

## 2022-01-08 RX ADMIN — SODIUM PHOSPHATE, MONOBASIC, MONOHYDRATE 10 MMOL: 276; 142 INJECTION, SOLUTION INTRAVENOUS at 09:56

## 2022-01-08 RX ADMIN — POTASSIUM CHLORIDE AND SODIUM CHLORIDE: 450; 150 INJECTION, SOLUTION INTRAVENOUS at 04:38

## 2022-01-08 RX ADMIN — DEXTROSE AND SODIUM CHLORIDE: 5; 450 INJECTION, SOLUTION INTRAVENOUS at 04:25

## 2022-01-08 RX ADMIN — SODIUM CHLORIDE 1000 ML: 9 INJECTION, SOLUTION INTRAVENOUS at 00:29

## 2022-01-08 RX ADMIN — INSULIN LISPRO 1 UNITS: 100 INJECTION, SOLUTION INTRAVENOUS; SUBCUTANEOUS at 21:12

## 2022-01-08 RX ADMIN — SODIUM CHLORIDE 4.9 UNITS/HR: 9 INJECTION, SOLUTION INTRAVENOUS at 04:10

## 2022-01-08 RX ADMIN — INSULIN GLARGINE 22 UNITS: 100 INJECTION, SOLUTION SUBCUTANEOUS at 12:35

## 2022-01-08 RX ADMIN — KETOROLAC TROMETHAMINE 15 MG: 30 INJECTION, SOLUTION INTRAMUSCULAR at 12:45

## 2022-01-08 RX ADMIN — MORPHINE SULFATE 2 MG: 2 INJECTION, SOLUTION INTRAMUSCULAR; INTRAVENOUS at 07:19

## 2022-01-08 RX ADMIN — SODIUM BICARBONATE 50 MEQ: 84 INJECTION, SOLUTION INTRAVENOUS at 03:26

## 2022-01-08 ASSESSMENT — ENCOUNTER SYMPTOMS
VOMITING: 0
COUGH: 0
SORE THROAT: 0
DIARRHEA: 0
RHINORRHEA: 0
SHORTNESS OF BREATH: 0
CONSTIPATION: 0
BACK PAIN: 1
ABDOMINAL PAIN: 0
NAUSEA: 0

## 2022-01-08 ASSESSMENT — PAIN DESCRIPTION - PAIN TYPE: TYPE: ACUTE PAIN

## 2022-01-08 ASSESSMENT — PAIN SCALES - GENERAL
PAINLEVEL_OUTOF10: 0
PAINLEVEL_OUTOF10: 6
PAINLEVEL_OUTOF10: 0
PAINLEVEL_OUTOF10: 8

## 2022-01-08 ASSESSMENT — PAIN DESCRIPTION - LOCATION: LOCATION: BACK

## 2022-01-08 ASSESSMENT — PAIN DESCRIPTION - DESCRIPTORS: DESCRIPTORS: DISCOMFORT;JABBING;NAGGING

## 2022-01-08 ASSESSMENT — PAIN DESCRIPTION - FREQUENCY: FREQUENCY: INTERMITTENT

## 2022-01-08 NOTE — H&P
3 times daily (with meals) 11/19/21   Elicia Bangura MD   insulin glargine (BASAGLAR KWIKPEN) 100 UNIT/ML injection pen Inject 22 Units into the skin nightly    Historical Provider, MD       Allergies:  Patient has no known allergies. Social History:   TOBACCO:   reports that she has never smoked. She has never used smokeless tobacco.  ETOH:   reports no history of alcohol use. Family History:       Problem Relation Age of Onset    Diabetes Maternal Grandmother     Diabetes Paternal Grandmother     Stroke Other     Thyroid Disease Neg Hx        REVIEW OF SYSTEMS:  Ten systems reviewed and negative except for as mentioned in the HPI  Review of Systems     Physical Exam:      Vitals: BP 99/68   Pulse 112   Temp 97.7 °F (36.5 °C) (Oral)   Resp 16   Ht 5' 1\" (1.549 m)   Wt 108 lb (49 kg)   LMP 12/16/2021   SpO2 100%   BMI 20.41 kg/m²     Physical Exam     General appearance: alert, lying in bed seems to be in moderate distress  Mental Status: oriented to person, place and time and normal affect  Lungs: clear to auscultation bilaterally, normal effort  Heart: regular rate and rhythm, no murmur  Abdomen: soft, nontender, nondistended, bowel sounds present, no masses  Extremities: no edema or redness  Skin: no gross lesions, rashes    Recent Labs     01/07/22  2235   WBC 9.5   HGB 14.7        No results for input(s): NA, K, CL, CO2, BUN, CREATININE, GLUCOSE, AST, ALT, ALB, BILITOT, ALKPHOS in the last 72 hours. Troponin T: No results for input(s): TROPONINI in the last 72 hours. ABGs: No results found for: PHART, PO2ART, KIN4XEI  INR: No results for input(s): INR in the last 72 hours. URINALYSIS:No results for input(s): NITRITE, COLORU, PHUR, LABCAST, WBCUA, RBCUA, MUCUS, TRICHOMONAS, YEAST, BACTERIA, CLARITYU, SPECGRAV, LEUKOCYTESUR, UROBILINOGEN, BILIRUBINUR, BLOODU, GLUCOSEU, AMORPHOUS in the last 72 hours.     Invalid input(s): Marie Prater  -----------------------------------------------------------------   XR CHEST PORTABLE    Result Date: 1/7/2022  EXAMINATION: ONE XRAY VIEW OF THE CHEST 1/7/2022 10:17 pm COMPARISON: Chest series from November 16, 2021 HISTORY: ORDERING SYSTEM PROVIDED HISTORY: DKA TECHNOLOGIST PROVIDED HISTORY: Reason for exam:->DKA FINDINGS: Adequate and symmetric aeration of the lungs. There are no formed consolidations, pleural effusions, or pneumothoraces. Trachea and central mainstem bronchi appear clear. The cardiomediastinal silhouette and pulmonary vascularity appear within normal limits. Osseous and thoracic soft tissue structures demonstrate no acute findings. No evidence of active cardiopulmonary pathology.            Assessment and Plan     *High anion gap metabolic acidosis secondary to DKA  *Medication noncompliance  *History of type 1 diabetes mellitus    -Admit to the ICU  -Continue with DKA protocol with insulin at 0.1 unit /kg an hour  -BMP every 4 hours  -N.p.o.  -IV fluids with half-normal saline and 20 mEq of potassium 150 mils an hour  -We will give 2 A of bicarb  -We will get the sets of blood cultures  -We will check TSH/free T4/lipid panel    *DVT prophylaxis Lovenox    Ganesh Chambers MD, MD  Admitting Hospitalist

## 2022-01-08 NOTE — PROGRESS NOTES
Patient admitted from ED to ICU , with the following belongings clothes, crocs, purse, wallet, keys , money earrings, placed on monitor, patient oriented to room and unit visiting hours. Patient guide at bedside, reviewed patient rights and responsibilities. MRSA nasal swab obtained. Bed alarm on. Call light within reach.

## 2022-01-08 NOTE — ED PROVIDER NOTES
Patient is a 82-year-old female with a history of IDDM, medication noncompliance who presents to the emergency department with dizziness. Patient states that she has \"not been doing things right\" for the past couple weeks including not eating. Patient states she has been taking her insulin however her sugar today was noted to be high, greater than 500 on her glucometer. Patient states she has been fatigued, low energy. Patient denies any chest pain or shortness of breath, headache, blurred vision or double vision, urinary or GI symptoms. Patient is followed by endocrinology. Patient denies any fevers or chills. Review of Systems   Constitutional: Positive for fever. Negative for chills and fatigue. HENT: Negative for congestion and rhinorrhea. Eyes: Negative for visual disturbance. Respiratory: Negative for cough and shortness of breath. Cardiovascular: Negative for chest pain, palpitations and leg swelling. Gastrointestinal: Negative for abdominal pain, constipation, diarrhea, nausea and vomiting. Endocrine: Positive for polyuria. Genitourinary: Negative for dysuria and urgency. Musculoskeletal: Negative for arthralgias and myalgias. Skin: Negative for rash and wound. Allergic/Immunologic: Negative for immunocompromised state. Neurological: Positive for dizziness and light-headedness. Negative for syncope, weakness, numbness and headaches. Psychiatric/Behavioral: Negative for confusion. The patient is not nervous/anxious. Physical Exam  Vitals and nursing note reviewed. Constitutional:       General: She is awake. She is not in acute distress. Appearance: She is normal weight. She is ill-appearing. She is not toxic-appearing. HENT:      Head: Normocephalic and atraumatic. Mouth/Throat:      Mouth: Mucous membranes are dry. Pharynx: Oropharynx is clear. Eyes:      Pupils: Pupils are equal, round, and reactive to light.    Cardiovascular:      Rate and Rhythm: Regular rhythm. Tachycardia present. Pulses: Normal pulses. Radial pulses are 2+ on the right side and 2+ on the left side. Heart sounds: Normal heart sounds. Pulmonary:      Effort: Pulmonary effort is normal.      Breath sounds: Normal breath sounds. Abdominal:      General: There is no distension. Palpations: Abdomen is soft. Tenderness: There is no abdominal tenderness. Skin:     General: Skin is warm and dry. Capillary Refill: Capillary refill takes less than 2 seconds. Neurological:      General: No focal deficit present. Mental Status: She is alert and oriented to person, place, and time. GCS: GCS eye subscore is 4. GCS verbal subscore is 5. GCS motor subscore is 6. Psychiatric:         Behavior: Behavior is cooperative. MDM  Number of Diagnoses or Management Options  Diabetic ketoacidosis without coma associated with type 1 diabetes mellitus ([facility] [address]  Diagnosis management comments: Patient is a 68-year-old female with a history of IDDM, noncompliant to her regiment of eating and carb counting, has been taking her insulin. Patient presents awake, alert, ill-appearing. Patient is nontoxic. Vital signs were noted showing tachycardia, afebrile. Physical exam is unremarkable, dry mucous membranes. Labs are consistent with diabetic ketoacidosis. Patient was treated with IV fluids, potassium was normal and insulin infusion was started. Patient was accepted to the hospitalist as well as ICU for critical care. Plan for admission was discussed with patient she is agreeable. Patient was monitored in the emergency department that further change and was admitted to the ICU when bed available. ED Course as of 01/08/22 2025   Floyd An Jan 07, 2022   6980 As per Dr. Sophie Mena patient has been accepted by Dr. Soni Rosales and Dr. Derek Salazar to the unit if needed.  [QC]   Sat Jan 08, 2022   0413 PROCEDURE  1/8/22         PERIPHERAL IV INSERTION  Risks, benefits and alternatives (for applicable procedures below) described. Performed By: Roro Bullock MD.    Indication: vascular access and inability to gain peripheral access. Informed consent: The patient provided verbal consent for this procedure. .  Procedure: After routine sterile preparation, a 20 g IV catheter was inserted in left upper extremity. The site was covered in usual fashion. Ultrasound Guidance:   not used. Patient tolerated the procedure Well    Roro Bullock MD, PGY 2          [QC]      ED Course User Index  [QC] Roro Bullock MD      EKG: This EKG is signed and interpreted by me. Rate: 105  Rhythm: Sinus  Interpretation: Sinus tachycardia, normal [place] interval, normal QRS, normal QT interval, no acute ST or T wave changes  Comparison: stable as compared to patient's most recent EKG     ED Course as of 01/08/22 2025 Fri Jan 07, 2022   2252 As per Dr. Grzegorz Marroquin patient has been accepted by Dr. Destiney Hernandez and Dr. Ana Viramontes to the unit if needed. [QC]   Sat Jan 08, 2022   0413 PROCEDURE  1/8/22         PERIPHERAL IV INSERTION  Risks, benefits and alternatives (for applicable procedures below) described. Performed By: Roro Bullock MD.    Indication: vascular access and inability to gain peripheral access. Informed consent: The patient provided verbal consent for this procedure. .  Procedure: After routine sterile preparation, a 20 g IV catheter was inserted in left upper extremity. The site was covered in usual fashion. Ultrasound Guidance:   not used.   Patient tolerated the procedure Well    Roro Bullock MD, PGY 2          [QC]      ED Course User Index  [QC] Roro Bullock MD       --------------------------------------------- PAST HISTORY ---------------------------------------------  Past Medical History:  has a past medical history of Bleeding at insertion site, Common femoral artery injury, right, initial encounter, Depressive disorder, Diabetic ketoacidosis ([facility] [address] DM type 1 (diabetes mellitus, type 1) ([facility] [address] Marijuana abuse, Non-compliance, and Trichimoniasis. Past Surgical History:  has a past surgical history that includes Abdomen surgery (N/A, 5/9/2019) and Bartholin gland cyst excision. Social History:  reports that she has never smoked. She has never used smokeless tobacco. She reports previous drug use. Drug[facility] She reports that she does not drink alcohol. Family History: family history includes Diabetes in her maternal grandmother and paternal grandmother; Stroke in an other family member. The patients home medications have been reviewed. Allergies: Patient has no known allergies.     -------------------------------------------------- RESULTS -------------------------------------------------    Lab  Results for orders placed or performed during the hospital encounter of 01/07/22   COVID-19, Rapid    Specimen: Nasopharyngeal Swab   Result Value Ref Range    SARS-CoV-2, NAAT Not Detected Not Detected   CBC Auto Differential   Result Value Ref Range    WBC 9.5 4.5 - 11.5 E9/L    RBC 5.25 3.50 - 5.50 E12/L    Hemoglobin 14.7 11.5 - 15.5 g/dL    Hematocrit 48.1 (H) 34.0 - 48.0 %    MCV 91.6 80.0 - 99.9 fL    MCH 28.0 26.0 - 35.0 pg    MCHC 30.6 (L) 32.0 - 34.5 %    RDW 12.4 11.5 - 15.0 fL    Platelets 696 823 - 033 E9/L    MPV 10.7 7.0 - 12.0 fL    Neutrophils % 56.0 43.0 - 80.0 %    Immature Granulocytes % 0.3 0.0 - 5.0 %    Lymphocytes % 39.3 20.0 - 42.0 %    Monocytes % 3.9 2.0 - 12.0 %    Eosinophils % 0.1 0.0 - 6.0 %    Basophils % 0.4 0.0 - 2.0 %    Neutrophils Absolute 5.31 1.80 - 7.30 E9/L    Immature Granulocytes # 0.03 E9/L    Lymphocytes Absolute 3.73 1.50 - 4.00 E9/L    Monocytes Absolute 0.37 0.10 - 0.95 E9/L    Eosinophils Absolute 0.01 (L) 0.05 - 0.50 E9/L    Basophils Absolute 0.04 0.00 - 0.20 E9/L   Lactate, Sepsis   Result Value Ref Range    Lactic Acid, Sepsis 1.5 0.5 - 1.9 mmol/L   PH, VENOUS   Result Value Ref Range    pH, Christoph 7.21 (L) 7.35 - 7. 45   TSH WITHOUT REFLEX   Result Value Ref Range    TSH 0.489 0.270 - 4.200 uIU/mL   Lipid panel   Result Value Ref Range    Cholesterol, Total 244 (H) 0 - 199 mg/dL    Triglycerides 337 (H) 0 - 149 mg/dL    HDL 48 >40 mg/dL    LDL Calculated 129 (H) 0 - 99 mg/dL    VLDL Cholesterol Calculated 67 mg/dL   SPECIMEN REJECTION   Result Value Ref Range    Rejected Test cmp mg phos trp     Reason for Rejection see below    Comprehensive Metabolic Panel w/ Reflex to MG   Result Value Ref Range    Sodium 133 132 - 146 mmol/L    Potassium reflex Magnesium 4.1 3.5 - 5.0 mmol/L    Chloride 99 98 - 107 mmol/L    CO2 7 (LL) 22 - 29 mmol/L    Anion Gap 27 (H) 7 - 16 mmol/L    Glucose 235 (H) 74 - 99 mg/dL    BUN 19 6 - 20 mg/dL    CREATININE 0.6 0.5 - 1.0 mg/dL    GFR Non-African American >60 >=60 mL/min/1.73    GFR African American >60     Calcium 8.6 8.6 - 10.2 mg/dL    Total Protein 7.0 6.4 - 8.3 g/dL    Albumin 3.7 3.5 - 5.2 g/dL    Total Bilirubin 0.2 0.0 - 1.2 mg/dL    Alkaline Phosphatase 109 (H) 35 - 104 U/L    ALT 26 0 - 32 U/L    AST 27 0 - 31 U/L   Magnesium   Result Value Ref Range    Magnesium 1.9 1.6 - 2.6 mg/dL   Troponin   Result Value Ref Range    Troponin, High Sensitivity 7 0 - 9 ng/L   Beta-Hydroxybutyrate   Result Value Ref Range    Beta-Hydroxybutyrate >4.50 (H) 0.02 - 0.27 mmol/L   PHOSPHORUS   Result Value Ref Range    Phosphorus 3.3 2.5 - 4.5 mg/dL   Basic Metabolic Panel   Result Value Ref Range    Sodium 135 132 - 146 mmol/L    Potassium 3.7 3.5 - 5.0 mmol/L    Chloride 106 98 - 107 mmol/L    CO2 16 (L) 22 - 29 mmol/L    Anion Gap 13 7 - 16 mmol/L    Glucose 147 (H) 74 - 99 mg/dL    BUN 14 6 - 20 mg/dL    CREATININE 0.5 0.5 - 1.0 mg/dL    GFR Non-African American >60 >=60 mL/min/1.73    GFR African American >60     Calcium 8.4 (L) 8.6 - 10.2 mg/dL   Magnesium   Result Value Ref Range    Magnesium 1.8 1.6 - 2.6 mg/dL   Phosphorus   Result Value Ref Range    Phosphorus 2.3 (L) 2.5 - 4.5 mg/dL Hemoglobin A1c   Result Value Ref Range    Hemoglobin A1C 16.8 (H) 4.0 - 5.6 %   CBC Auto Differential   Result Value Ref Range    WBC 8.3 4.5 - 11.5 E9/L    RBC 4.26 3.50 - 5.50 E12/L    Hemoglobin 12.1 11.5 - 15.5 g/dL    Hematocrit 35.8 34.0 - 48.0 %    MCV 84.0 80.0 - 99.9 fL    MCH 28.4 26.0 - 35.0 pg    MCHC 33.8 32.0 - 34.5 %    RDW 12.2 11.5 - 15.0 fL    Platelets 504 270 - 482 E9/L    MPV 10.7 7.0 - 12.0 fL    Neutrophils % 47.2 43.0 - 80.0 %    Immature Granulocytes % 0.1 0.0 - 5.0 %    Lymphocytes % 46.8 (H) 20.0 - 42.0 %    Monocytes % 4.7 2.0 - 12.0 %    Eosinophils % 0.7 0.0 - 6.0 %    Basophils % 0.5 0.0 - 2.0 %    Neutrophils Absolute 3.90 1.80 - 7.30 E9/L    Immature Granulocytes # 0.01 E9/L    Lymphocytes Absolute 3.87 1.50 - 4.00 E9/L    Monocytes Absolute 0.39 0.10 - 0.95 E9/L    Eosinophils Absolute 0.06 0.05 - 0.50 E9/L    Basophils Absolute 0.04 0.00 - 0.20 E9/L   POCT Glucose   Result Value Ref Range    Meter Glucose 160 (H) 74 - 99 mg/dL   POCT Glucose   Result Value Ref Range    Meter Glucose 162 (H) 74 - 99 mg/dL   POCT Glucose   Result Value Ref Range    Meter Glucose 119 (H) 74 - 99 mg/dL   POCT Glucose   Result Value Ref Range    Meter Glucose 110 (H) 74 - 99 mg/dL   POCT Glucose   Result Value Ref Range    Meter Glucose 131 (H) 74 - 99 mg/dL   POCT Glucose   Result Value Ref Range    Meter Glucose 138 (H) 74 - 99 mg/dL   POCT Glucose   Result Value Ref Range    Meter Glucose 163 (H) 74 - 99 mg/dL   POCT Glucose   Result Value Ref Range    Meter Glucose 99 74 - 99 mg/dL   EKG 12 Lead   Result Value Ref Range    Ventricular Rate 105 BPM    Atrial Rate 105 BPM    P-R Interval 150 ms    QRS Duration 68 ms    Q-T Interval 346 ms    QTc Calculation (Bazett) 457 ms    P Axis 67 degrees    R Axis 69 degrees    T Axis 52 degrees       Radiology  XR CHEST PORTABLE   Final Result   No evidence of active cardiopulmonary pathology.            ------------------------- NURSING NOTES AND VITALS REVIEWED ---------------------------  Date / Time Roomed:  1/7/2022  9:54 PM  ED Bed Assignment:  [id number]    The nursing notes within the ED encounter and vital signs as below have been reviewed. Patient Vitals for the past 24 hrs:   BP Temp Temp src Pulse Resp SpO2 Height Weight   01/08/22 1900 -- -- -- 102 11 -- -- --   01/08/22 1800 -- -- -- 100 23 -- -- --   01/08/22 1700 -- -- -- 105 25 -- -- --   01/08/22 1200 -- 97 °F (36.1 °C) Axillary 96 11 100 % -- --   01/08/22 1100 -- -- -- 98 20 100 % -- --   01/08/22 1000 -- -- -- 100 (!) 35 97 % -- --   01/08/22 0900 122/86 -- -- 95 12 100 % -- --   01/08/22 0800 -- 97.4 °F (36.3 °C) Axillary 99 11 100 % -- --   01/08/22 0605 108/71 -- -- 107 (!) 100 100 % -- --   01/08/22 0500 118/87 -- -- 112 12 -- -- --   01/08/22 0418 118/87 -- -- 104 13 100 % -- --   01/08/22 0113 109/72 -- -- 107 18 100 % -- --   01/07/22 2157 99/68 97.7 °F (36.5 °C) Oral 112 16 100 % 5' 1\" (1.549 m) 108 lb (49 kg)       Oxygen Saturation Interpretation: Normal      ------------------------------------------ PROGRESS NOTES ------------------------------------------  Re-evaluation(s):  Patients symptoms show no change  Repeat physical examination is not changed    I have spoken with the patient and discussed todays results, in addition to providing specific details for the plan of care and counseling regarding the diagnosis and prognosis. Their questions are answered at this time and they are agreeable with the plan.      --------------------------------- ADDITIONAL PROVIDER NOTES ---------------------------------  Consultations:  Spoke with Dr. Washington rayo,  They will admit this patient.   Spoke with Dr. Tobi Calvert, they will be on consult and patient accepted to the ICU    This patient's ED course included: a personal history and physicial examination, multiple bedside re-evaluations, IV medications, cardiac monitoring, continuous pulse oximetry and complex medical decision making and

## 2022-01-08 NOTE — CONSULTS
[facility]  Department of Critical Care   MICU H&P      Date and time: 1/8/2022 8:45 AM  Patient's name:  Enrico Knox  Medical Record Number: [id number]  Patient's account/billing number: [de-identified]  Patient's YOB: 1995  Age: 27 y. o. Date of Admission: 1/7/2022  9:54 PM  Length of stay during current admission: 1      Primary Care Physician: Afshin Luis DO  ICU Attending Physician: Dr. Bre Ely    Code Status: Full Code    Reason for ICU admission: DKA    History of Present Illness   The patient is a 27 y.o. female       History of insulin-dependent DM1, longstanding history of medication noncompliance. Most recent admission for DKA in November 2021. She presented to the ED on the evening of 1/7/2022 and essentially endorsed noncompliance with her routine for her diabetes. Says that she has been fatigued and had low energy, also states that she has had increased urine output. She has not had any nausea or vomiting or diarrhea. Has not had any fevers, cough, sore throat, or chest pain. She follows closely with endocrinology. In the ER she reported dizziness and lightheadedness, she currently denies this. Patient also is complaining of low back pain which is chronic. In the ER labs are consistent with DKA, anion gap 27, bicarb 7, beta hydroxybutyrate greater than 4.50, venous pH 7.21; she was started on DKA protocol last night and as of this morning patient's anion gap is now 13. We will bridge her insulin and start her diet. She is stable for transfer out of the unit. Her symptoms today are improved. ROS: Denies Fever/chills/CP/SOB/N/V/D/C/Dysuria/Blood in stool or urine. Endorses polyuria and fatigue which are both improved.       Past Medical History:      Diagnosis Date    Bleeding at insertion site 01/05/2018    Common femoral artery injury, right, initial encounter 01/05/2018    Depressive disorder     Diabetic k[facility]acidosis ([facility]     DM type 1 (diabetes mellitus, type 1) ([facility] [address]     Marijuana abuse     Non-compliance     Trichimoniasis 11/19/2021       Past Surgical History:        Procedure Laterality Date    ABDOMEN SURGERY N/A 5/9/2019    INCISION AND DRAINAGE OF SUPRAPUBIC ABSESS performed by Bernard Gerber MD at [facility]      last yr       Medications Prior to Admission:    Prior to Admission medications    Medication Sig Start Date End Date Taking? Authorizing Provider   acetaminophen (TYLENOL) 500 MG tablet Take 1 tablet by mouth every 4 hours as needed for Pain or Fever 12/22/21 1/1/22  Mitchell Hollis DO   insulin lispro (HUMALOG) 100 UNIT/ML injection vial Inject 7 Units into the skin 3 times daily (with meals) 11/19/21   Niles Figueroa MD   insulin glargine (BASAGLAR KWIKPEN) 100 UNIT/ML injection pen Inject 22 Units into the skin nightly    Historical Provider, MD       Allergies:  Patient has no known allergies. Social History:   TOBACCO:   reports that she has never smoked. She has never used smokeless tobacco.  ETOH:   reports no history of alcohol use. Family History:       Problem Relation Age of Onset    Diabetes Maternal Grandmother     Diabetes Paternal Grandmother     Stroke Other     Thyroid Disease Neg Hx        REVIEW OF SYSTEMS:    Review of Systems   Constitutional: Positive for fatigue. Negative for chills and fever. HENT: Negative for rhinorrhea and sore throat. Eyes: Negative for visual disturbance. Respiratory: Negative for cough and shortness of breath. Cardiovascular: Negative for chest pain and palpitations. Gastrointestinal: Negative for abdominal pain, diarrhea, nausea and vomiting. Endocrine: Positive for polyuria. Negative for polydipsia. Genitourinary: Negative for dysuria and flank pain. Musculoskeletal: Positive for back pain (Lower; chronic). Negative for myalgias. Skin: Negative for rash and wound.    Neurological: Negative for dizziness and headaches. Psychiatric/Behavioral: Negative for confusion. Objective     VS: /71   Pulse 107   Temp 97.7 °F (36.5 °C) (Oral)   Resp (!) 100   Ht 5' 1\" (1.549 m)   Wt 108 lb (49 kg)   LMP 12/16/2021   SpO2 100%   BMI 20.41 kg/m²           I & O - 24hr: No intake or output data in the 24 hours ending 01/08/22 0845    Physical Exam:  · General Appearance: alert, appears stated age and cooperative  · Neck: no JVD and supple, symmetrical, trachea midline  · Lung: clear to auscultation bilaterally  · Heart: regular rate and rhythm, S1, S2 normal, no murmur, click, rub or gallop  · Abdomen: soft, non-tender; bowel sounds normal; no masses,  no organomegaly  · Extremities:  extremities normal, atraumatic, no cyanosis or edema  · Musculoskeletal: No joint swelling, no muscle tenderness. ROM normal in all joints of extremities.    · Neurologic: Mental status: Alert, oriented, thought content appropriate      CURRENT VENTILATION STATUS:     [] Ventilator  [] BIPAP  [] Nasal Cannula [x] Room Air      IF INTUBATED, ET TUBE MARKING AT LOWER LIP:       cms    SECRETIONS Amount:  [] Small [] Moderate  [] Large  [] None  Color:     [] White [] Colored  [] Bloody    SEDATION:  RAAS Score:  [] Propofol gtt  [] Versed gtt  [] Ativan gtt   [x] No Sedation    PARALYZED:  [x] No    [] Yes      VASOPRESSORS:  [x] No    [] Yes    If yes -   [] Levophed       [] Dopamine     [] Vasopressin       [] Dobutamine  [] Phenylephrine         [] Epinephrine    CENTRAL LINES:     [x] No   [] Yes   (Date of Insertion:   )           If yes -     [] Right IJ     [] Left IJ [] Right Femoral [] Left Femoral                   [] Right Subclavian [] Left Subclavian       CROFT'S CATHETER:   [x] No   [] Yes  (Date of Insertion:   )     URINE OUTPUT:            [] Good   [] Low              [] Anuric    Labs and Imaging Studies   Basic Labs  Recent Labs     01/08/22  0013 01/08/22  0700    135   K 4.1 3.7   CL 99 106 issues   Start p.o. diet once able to bridge insulin    5. Renal   Insulin-dependent DM 1   History of medication noncompliance, frequent admissions for DKA   Admitted yesterday for DKA likely set off by noncompliance   Monitor UOP   Normal renal function   Monitor electrolytes, replace as needed   Initially with beta hydroxybutyrate greater than 4.  5 0, venous pH 7.21   Anion gap initially 27, now 13   Bridge insulin today, transfer out of unit    6. Infectious disease   Presented in DKA likely secondary to noncompliance   No fever, no signs of infectious source at this time; no leukocytosis   Blood, urine, MRSA cultures pending   Rapid COVID is negative    7. Endocrine   Insulin-dependent DM 1   DKA, likely due to noncompliance   Initial anion gap 27, now 15   Endocrine following   Bridge insulin, start p.o. diet, transfer out of unit    8. Social   Full code    PT/OT evaluation: N/A  DVT prophylaxis/ GI prophylaxis: Lovenox (patient refused)/protonix  Disposition: Transfer out of ICU    Blaze Suh DO, PGY-2   Attending physician: Dr. Fredy Talbot personally saw, examined and provided care for the patient. Radiographs, labs and medication list were reviewed by me independently. I spoke with bedside nursing, therapists and consultants. Critical care services and times documented are independent of procedures and multidisciplinary rounds with Residents. Additionally comprehensive, multidisciplinary rounds were conducted with the MICU team. The case was discussed in detail and plans for care were established. Review of Residents documentation was conducted and revisions were made as appropriate. I agree with the above documented exam, problem list and plan of care.   Tanesha Vázquez MD   CCT excluding procedures:33'

## 2022-01-08 NOTE — PATIENT CARE CONFERENCE
Intensive Care Daily Quality Rounding Checklist      ICU Team Members: Dr. Taylor Yun, Dr. Elena Olivier, bedside nurse, charge nurse    ICU Day #: NUMBER: 1    Intubation Date: n/a    Ventilator Day #: n/a    Central Line Insertion Date:  n/a        Day #: n/a     Arterial Line Insertion Date: n/a      Day #: n/a    Temporary Hemodialysis Catheter Insertion Date :n/a      Day # n/a    DVT Prophylaxis:      GI Prophylaxis:    Bey Catheter Insertion Date: n/a       Day #: n/a      Continued need (if yes, reason documented and discussed with physician): n/a    Skin Issues/ Wounds and ordered treatment discussed on rounds: none    Goals/ Plans for the Day: Daily labs, transition to home insulin regimen once gap closed. Transfer to [facility].

## 2022-01-08 NOTE — ED NOTES
Bed: 17  Expected date:   Expected time:   Means of arrival:   Comments:  ems     Marlyn Omalley RN  01/07/22 5586

## 2022-01-08 NOTE — CONSULTS
ENDOCRINOLOGY INITIAL CONSULTATION NOTE       Date of admission: 1/7/2022  Date of service: 1/9/2022  Admitting physician: Kristy William MD   Primary Care Physician: Radha Parikh DO  Consultant physician: Shaina Weathers MD      Reason for the consultation:  DM type 1 admitted with DKA     History of Present Illness:  Services were provided through a video synchronous discussion     Erasmo William is a 27 y.o. old female with PMH of DM type 1 admitted to [facility] on 1/7/2022 because of N/V and malaise and found to be in DKA, endocrine team was consulted for diabetes management. The patient was in her usual state of health until the days of admission when started c/o nausea, vomiting and abdominal pain. She denies fever, chills, chest pain, cough    Admission labs: , AG 27, Bicarb 7, Cr 0.6, K 4.1, BHB >4.5     The patient was started on insulin drip as per DKA protocol then transitioned to sq insulin      Prior to admission  Type 1 DM was diagnosed at the age 8. Prior to admission, she was basaglar 22 units daily, Humalog with meals using carb ratio 1u:10g  + ss 1:50>150. self-blood glucose monitoring has been highly variable prior to admission. She is due for annual eye exam but denied any history of diabetic retinopathy.   The patient performs her own feet care and doesn't see podiatry service  Microvascular complications:  No Retinopathy, Nephropathy + Neuropathy   Macrovascular complications: no CAD, PVD, or Stroke    Lab Results   Component Value Date    LABA1C 16.8 01/08/2022     Inpatient diet:   Carb Restricted diet     Point of care glucose monitoring (Independently reviewed)   Recent Labs     01/08/22  0719 01/08/22  0843 01/08/22  1029 01/08/22  1155 01/08/22  1300 01/08/22  1704 01/08/22  2044 01/09/22  0737   GLUMET 119* 110* 131* 138* 163* 99 173* 375*     Past medical history:   Past Medical History:   Diagnosis Date    Bleeding at insertion site 01/05/2018    Common femoral artery injury, right, initial encounter 01/05/2018    Depressive disorder     [facility] [address]     DM type 1 (diabetes mellitus, type 1) ([facility] [address]     Marijuana abuse     Non-compliance     Trichimoniasis 11/19/2021       Past surgical history:  Past Surgical History:   Procedure Laterality Date    ABDOMEN SURGERY N/A 5/9/2019    INCISION AND DRAINAGE OF SUPRAPUBIC ABSESS performed by Zee Vigil MD at [address]      last yr       Social history:   Tobacco:   reports that she has never smoked. She has never used smokeless tobacco.  Alcohol:   reports no history of alcohol use. Drugs:   reports previous drug use. Drug: Marijuana Deboraha Sanes). Family history:    Family History   Problem Relation Age of Onset    Diabetes Maternal Grandmother     Diabetes Paternal Grandmother     Stroke Other     Thyroid Disease Neg Hx        Allergy and drug reactions:   No Known Allergies    Scheduled Meds:   insulin glargine  25 Units SubCUTAneous Nightly    sodium chloride  1,000 mL IntraVENous Once    enoxaparin  40 mg SubCUTAneous Daily    insulin lispro  0-12 Units SubCUTAneous TID WC    insulin lispro  0-6 Units SubCUTAneous Nightly    insulin lispro  7 Units SubCUTAneous TID WC     PRN Meds:   glucose, 15 g, PRN  dextrose, 12.5 g, PRN  glucagon (rDNA), 1 mg, PRN  dextrose, 100 mL/hr, PRN  sodium chloride, 25 mL, PRN  acetaminophen, 650 mg, Q6H PRN   Or  acetaminophen, 650 mg, Q6H PRN  dextrose, 12.5 g, PRN  potassium chloride, 10 mEq, PRN  magnesium sulfate, 1,000 mg, PRN  sodium phosphate IVPB, 10 mmol, PRN   Or  sodium phosphate IVPB, 15 mmol, PRN   Or  sodium phosphate IVPB, 20 mmol, PRN  polyethylene glycol, 17 g, Daily PRN  ketorolac, 15 mg, Q6H PRN      Continuous Infusions:   dextrose      sodium chloride         Review of Systems  All systems reviewed.  All negative except for symptoms mentioned in HPI     OBJECTIVE    /80 Comment: 119/80  Pulse 108 Temp 98.4 °F (36.9 °C) (Oral)   Resp 14   Ht 5' 1\" (1.549 m)   Wt 108 lb (49 kg)   LMP 12/16/2021   SpO2 100%   BMI 20.41 kg/m²     Intake/Output Summary (Last 24 hours) at 1/9/2022 0911  Last data filed at 1/8/2022 1700  Gross per 24 hour   Intake 1338 ml   Output --   Net 1338 ml     Physical examination:  Due to this being a TeleHealth encounter, evaluation of the following organ systems is limited: Vitals/Constitutional/EENT/Resp/CV/GI//MS/Neuro/Skin/Heme-Lymph-Imm. Modified physical exam through Telemedicine camera    General: Communicating well via camera   Neck: no obvious neck mass. No obvious neck deformity     CVS: no distress   Chest: no distress. Chest is moving with respiration    Extremities:  no visible tremor  Skin: No visible rashes as seen from camera   Musculoskeletal: no visible deformity  Neuro: Alert and oriented to person, place, and time. Psychiatric: Normal mood and affect.  Behavior is normal    Review of Laboratory Data:  I personally reviewed the following labs:   Recent Labs     01/07/22 2235 01/08/22  0700 01/09/22  0744   WBC 9.5 8.3 7.5   RBC 5.25 4.26 4.29   HGB 14.7 12.1 12.3   HCT 48.1* 35.8 36.0   MCV 91.6 84.0 83.9   MCH 28.0 28.4 28.7   MCHC 30.6* 33.8 34.2   RDW 12.4 12.2 12.3    335 325   MPV 10.7 10.7 10.6     Recent Labs     01/08/22  0013 01/08/22  0700 01/09/22  0744    135 134   K 4.1 3.7 4.3   CL 99 106 102   CO2 7* 16* 18*   BUN 19 14 13   CREATININE 0.6 0.5 0.7   GLUCOSE 235* 147* 385*   CALCIUM 8.6 8.4* 8.7   PROT 7.0  --   --    LABALBU 3.7  --   --    BILITOT 0.2  --   --    ALKPHOS 109*  --   --    AST 27  --   --    ALT 26  --   --      Beta-Hydroxybutyrate   Date Value Ref Range Status   01/08/2022 >4.50 (H) 0.02 - 0.27 mmol/L Final   11/18/2021 0.19 0.02 - 0.27 mmol/L Final   11/17/2021 1.30 (H) 0.02 - 0.27 mmol/L Final     Lab Results   Component Value Date    LABA1C 16.8 01/08/2022    LABA1C 14.2 09/17/2021    LABA1C 15.1 07/26/2021 Lab Results   Component Value Date/Time    TSH 0.489 01/08/2022 12:13 AM     Lab Results   Component Value Date    LABA1C 16.8 01/08/2022    GLUCOSE 385 01/09/2022    MALBCR 53.2 08/24/2021    LABMICR 81.9 08/24/2021    LABCREA 154 08/24/2021     Lab Results   Component Value Date    TRIG 337 01/08/2022    HDL 48 01/08/2022    LDLCALC 129 01/08/2022    CHOL 244 01/08/2022       Blood culture   Lab Results   Component Value Date    BC 5 Days no growth 02/24/2021    BC 5 Days no growth 11/10/2020       Radiology:  XR CHEST PORTABLE   Final Result   No evidence of active cardiopulmonary pathology. Medical Records/Labs/Images review:   I personally reviewed and summarized previous records   All labs and imaging were reviewed independently     Bob Espinoza, a 27 y.o.-old female seen today for inpatient diabetes management     Brittle type 1 DM admitted with DKA   · Patient's DM is profoundly uncontrolled due to poor compliance with insulin therapy and diet  · We recommend following insulin regimen  · Lantus 22U daily in AM   · Humalog 7U with meals   · Medium dose sliding scale     · Glucose check before meals and at bed time   · Will titrate insulin dose based on blood glucose trend & insulin requirement  · Ok to dc from endocrine standpoint. On discharge we recommend dc pt on previous home regimen Lantus 22 Units daily, Humalog 1u:10g Carbs + medium sliding scale   · Pt will follow with us after discharge. Endocrine follow up visit, Monday 2/7 at 2:00pm     Recurrent DKA due to medications non-compliance   · Transitioned to sq insulin yesterday. · Adjusted insulin regimen as per above      Thank you for allowing us to participate in the care of this patient. Please do not hesitate to contact us with any additional questions.      Ab Gale MD  Endocrinologist, [facility]   [address] [facility] [address]   Phone: [phone]  Fax: [phone]  --------------------------------------------  An electronic signature was used to authenticate this note.  Emilia Villalobos MD on 1/9/2022 at 9:11 AM

## 2022-01-08 NOTE — PROGRESS NOTES
[facility] (formerly [facility]3] Hospitalist   Progress Note    Admitting Date and Time: 1/7/2022  9:54 PM  Admit Dx: DKA, type 1, not at goal [facility]) [E10.10]    Subjective:    Patient was admitted with DKA, type 1, not at goal ([facility] [address] [E10.10]. Patient denies fever, chills, cp, sob, n/v.     sodium chloride  1,000 mL IntraVENous Once    enoxaparin  40 mg SubCUTAneous Daily    insulin glargine  22 Units SubCUTAneous Nightly    insulin lispro  0-12 Units SubCUTAneous TID WC    insulin lispro  0-6 Units SubCUTAneous Nightly    insulin lispro  7 Units SubCUTAneous TID WC     sodium chloride, 25 mL, PRN  acetaminophen, 650 mg, Q6H PRN   Or  acetaminophen, 650 mg, Q6H PRN  dextrose, 12.5 g, PRN  potassium chloride, 10 mEq, PRN  magnesium sulfate, 1,000 mg, PRN  sodium phosphate IVPB, 10 mmol, PRN   Or  sodium phosphate IVPB, 15 mmol, PRN   Or  sodium phosphate IVPB, 20 mmol, PRN  polyethylene glycol, 17 g, Daily PRN  ketorolac, 15 mg, Q6H PRN         Objective:      PHYSICAL EXAM:    Vitals:  /86   Pulse 96   Temp 97 °F (36.1 °C) (Axillary)   Resp 11   Ht 5' 1\" (1.549 m)   Wt 108 lb (49 kg)   LMP 12/16/2021   SpO2 100%   BMI 20.41 kg/m²     General:  Appears comfortable. Answers questions appropriately and cooperative with exam  HEENT:  Mucous membranes moist. No erythema, rhinorrhea, or post-nasal drip noted. Neck:  No carotid bruits. Heart:  Rhythm regular at rate of 98  Lungs:  CTA. No wheeze, rales, or rhonchi  Abdomen:  Positive bowel sounds positive. Soft. Non-tender. No guarding, rebound or rigidity. Breast/Rectal/Genitourinary: not pertinent. Extremities:  Negative for lower extremity edema  Skin:  Warm and dry  Vascular: 2/4 Dorsalis Pedis pulses bilaterally. Neuro:  Cranial nerves 2-12 grossly intact, no focal weakness or change in sensation noted. Extraocular muscles intact. Pupils equal, round, reactive to light.                 Recent Labs     01/08/22  0013 01/08/22  0700    135   K 4.1 3.7   CL 99 106   CO2 7* 16*   BUN 19 14   CREATININE 0.6 0.5   GLUCOSE 235* 147*   CALCIUM 8.6 8.4*       Recent Labs     01/07/22  2235 01/08/22  0700   WBC 9.5 8.3   RBC 5.25 4.26   HGB 14.7 12.1   HCT 48.1* 35.8   MCV 91.6 84.0   MCH 28.0 28.4   MCHC 30.6* 33.8   RDW 12.4 12.2    335   MPV 10.7 10.7       CBC with Differential:    Lab Results   Component Value Date    WBC 8.3 01/08/2022    RBC 4.26 01/08/2022    HGB 12.1 01/08/2022    HCT 35.8 01/08/2022     01/08/2022    MCV 84.0 01/08/2022    MCH 28.4 01/08/2022    MCHC 33.8 01/08/2022    RDW 12.2 01/08/2022    NRBC 0.0 05/10/2019    SEGSPCT 58 09/29/2013    METASPCT 1.0 04/29/2020    LYMPHOPCT 46.8 01/08/2022    MONOPCT 4.7 01/08/2022    MYELOPCT 1.0 04/29/2020    BASOPCT 0.5 01/08/2022    MONOSABS 0.39 01/08/2022    LYMPHSABS 3.87 01/08/2022    EOSABS 0.06 01/08/2022    BASOSABS 0.04 01/08/2022     CMP:    Lab Results   Component Value Date     01/08/2022    K 3.7 01/08/2022    K 4.1 01/08/2022     01/08/2022    CO2 16 01/08/2022    BUN 14 01/08/2022    CREATININE 0.5 01/08/2022    GFRAA >60 01/08/2022    LABGLOM >60 01/08/2022    GLUCOSE 147 01/08/2022    PROT 7.0 01/08/2022    LABALBU 3.7 01/08/2022    CALCIUM 8.4 01/08/2022    BILITOT 0.2 01/08/2022    ALKPHOS 109 01/08/2022    AST 27 01/08/2022    ALT 26 01/08/2022     BMP:    Lab Results   Component Value Date     01/08/2022    K 3.7 01/08/2022    K 4.1 01/08/2022     01/08/2022    CO2 16 01/08/2022    BUN 14 01/08/2022    LABALBU 3.7 01/08/2022    CREATININE 0.5 01/08/2022    CALCIUM 8.4 01/08/2022    GFRAA >60 01/08/2022    LABGLOM >60 01/08/2022    GLUCOSE 147 01/08/2022     Magnesium:    Lab Results   Component Value Date    MG 1.8 01/08/2022     Phosphorus:    Lab Results   Component Value Date    PHOS 2.3 01/08/2022        Radiology:   XR CHEST PORTABLE   Final Result   No evidence of active cardiopulmonary pathology. Assessment:    Active Problems:    DKA, type 1, not at goal [facility])  Resolved Problems:    * No resolved hospital problems. *      Plan:  1. dka  Insulin gtt  Close monitoring in icu.  transition to subq when able  2. Dm type 1 uncontrolled monitor bs and tx with insulin  3. Hypophosphatemia monitor and replace prn  4. Dehydration iv fluids if needed otherwise encourage po  5.  N/v prn meds as needed    chart reviewed and updated by nursing    Time spent is 35 min    Electronically signed by Sandoval Mcfadden DO on 1/8/2022 at 6:30 PM

## 2022-01-09 VITALS
OXYGEN SATURATION: 98 % | RESPIRATION RATE: 15 BRPM | SYSTOLIC BLOOD PRESSURE: 112 MMHG | WEIGHT: 108 LBS | BODY MASS INDEX: 20.39 KG/M2 | DIASTOLIC BLOOD PRESSURE: 76 MMHG | HEIGHT: 61 IN | HEART RATE: 104 BPM | TEMPERATURE: 97.6 F

## 2022-01-09 LAB
ANION GAP SERPL CALCULATED.3IONS-SCNC: 14 MMOL/L (ref 7–16)
BASOPHILS ABSOLUTE: 0.03 E9/L (ref 0–0.2)
BASOPHILS RELATIVE PERCENT: 0.4 % (ref 0–2)
BUN BLDV-MCNC: 13 MG/DL (ref 6–20)
CALCIUM SERPL-MCNC: 8.7 MG/DL (ref 8.6–10.2)
CHLORIDE BLD-SCNC: 102 MMOL/L (ref 98–107)
CO2: 18 MMOL/L (ref 22–29)
CREAT SERPL-MCNC: 0.7 MG/DL (ref 0.5–1)
EOSINOPHILS ABSOLUTE: 0.07 E9/L (ref 0.05–0.5)
EOSINOPHILS RELATIVE PERCENT: 0.9 % (ref 0–6)
GFR AFRICAN AMERICAN: >60
GFR NON-AFRICAN AMERICAN: >60 ML/MIN/1.73
GLUCOSE BLD-MCNC: 385 MG/DL (ref 74–99)
HCT VFR BLD CALC: 36 % (ref 34–48)
HEMOGLOBIN: 12.3 G/DL (ref 11.5–15.5)
IMMATURE GRANULOCYTES #: 0.02 E9/L
IMMATURE GRANULOCYTES %: 0.3 % (ref 0–5)
LYMPHOCYTES ABSOLUTE: 2.82 E9/L (ref 1.5–4)
LYMPHOCYTES RELATIVE PERCENT: 37.4 % (ref 20–42)
MAGNESIUM: 1.9 MG/DL (ref 1.6–2.6)
MCH RBC QN AUTO: 28.7 PG (ref 26–35)
MCHC RBC AUTO-ENTMCNC: 34.2 % (ref 32–34.5)
MCV RBC AUTO: 83.9 FL (ref 80–99.9)
METER GLUCOSE: 104 MG/DL (ref 74–99)
METER GLUCOSE: 120 MG/DL (ref 74–99)
METER GLUCOSE: 375 MG/DL (ref 74–99)
METER GLUCOSE: 69 MG/DL (ref 74–99)
MONOCYTES ABSOLUTE: 0.3 E9/L (ref 0.1–0.95)
MONOCYTES RELATIVE PERCENT: 4 % (ref 2–12)
MRSA CULTURE ONLY: NORMAL
NEUTROPHILS ABSOLUTE: 4.3 E9/L (ref 1.8–7.3)
NEUTROPHILS RELATIVE PERCENT: 57 % (ref 43–80)
PDW BLD-RTO: 12.3 FL (ref 11.5–15)
PHOSPHORUS: 2.2 MG/DL (ref 2.5–4.5)
PLATELET # BLD: 325 E9/L (ref 130–450)
PMV BLD AUTO: 10.6 FL (ref 7–12)
POTASSIUM SERPL-SCNC: 4.3 MMOL/L (ref 3.5–5)
RBC # BLD: 4.29 E12/L (ref 3.5–5.5)
SODIUM BLD-SCNC: 134 MMOL/L (ref 132–146)
WBC # BLD: 7.5 E9/L (ref 4.5–11.5)

## 2022-01-09 PROCEDURE — 85025 COMPLETE CBC W/AUTO DIFF WBC: CPT

## 2022-01-09 PROCEDURE — 6370000000 HC RX 637 (ALT 250 FOR IP): Performed by: INTERNAL MEDICINE

## 2022-01-09 PROCEDURE — 99254 IP/OBS CNSLTJ NEW/EST MOD 60: CPT | Performed by: INTERNAL MEDICINE

## 2022-01-09 PROCEDURE — 99239 HOSP IP/OBS DSCHRG MGMT >30: CPT | Performed by: INTERNAL MEDICINE

## 2022-01-09 PROCEDURE — 82962 GLUCOSE BLOOD TEST: CPT

## 2022-01-09 PROCEDURE — 36415 COLL VENOUS BLD VENIPUNCTURE: CPT

## 2022-01-09 PROCEDURE — 84100 ASSAY OF PHOSPHORUS: CPT

## 2022-01-09 PROCEDURE — 80048 BASIC METABOLIC PNL TOTAL CA: CPT

## 2022-01-09 PROCEDURE — 83735 ASSAY OF MAGNESIUM: CPT

## 2022-01-09 RX ORDER — INSULIN GLARGINE 100 [IU]/ML
25 INJECTION, SOLUTION SUBCUTANEOUS NIGHTLY
Status: DISCONTINUED | OUTPATIENT
Start: 2022-01-09 | End: 2022-01-09

## 2022-01-09 RX ORDER — PEN NEEDLE, DIABETIC 32GX 5/32"
NEEDLE, DISPOSABLE MISCELLANEOUS
Qty: 250 EACH | Refills: 5 | Status: SHIPPED | OUTPATIENT
Start: 2022-01-09 | End: 2022-05-02 | Stop reason: SDUPTHER

## 2022-01-09 RX ORDER — INSULIN GLARGINE 100 [IU]/ML
22 INJECTION, SOLUTION SUBCUTANEOUS NIGHTLY
Status: DISCONTINUED | OUTPATIENT
Start: 2022-01-09 | End: 2022-01-09

## 2022-01-09 RX ORDER — NICOTINE POLACRILEX 4 MG
15 LOZENGE BUCCAL PRN
Status: DISCONTINUED | OUTPATIENT
Start: 2022-01-09 | End: 2022-01-09 | Stop reason: HOSPADM

## 2022-01-09 RX ORDER — DEXTROSE MONOHYDRATE 25 G/50ML
12.5 INJECTION, SOLUTION INTRAVENOUS PRN
Status: DISCONTINUED | OUTPATIENT
Start: 2022-01-09 | End: 2022-01-09 | Stop reason: HOSPADM

## 2022-01-09 RX ORDER — INSULIN LISPRO 100 [IU]/ML
INJECTION, SOLUTION INTRAVENOUS; SUBCUTANEOUS
Qty: 5 PEN | Refills: 5 | Status: ON HOLD | OUTPATIENT
Start: 2022-01-09 | End: 2022-04-26 | Stop reason: SDUPTHER

## 2022-01-09 RX ORDER — DEXTROSE MONOHYDRATE 50 MG/ML
100 INJECTION, SOLUTION INTRAVENOUS PRN
Status: DISCONTINUED | OUTPATIENT
Start: 2022-01-09 | End: 2022-01-09 | Stop reason: HOSPADM

## 2022-01-09 RX ORDER — INSULIN GLARGINE 100 [IU]/ML
22 INJECTION, SOLUTION SUBCUTANEOUS EVERY MORNING
Status: DISCONTINUED | OUTPATIENT
Start: 2022-01-09 | End: 2022-01-09 | Stop reason: HOSPADM

## 2022-01-09 RX ORDER — INSULIN GLARGINE 100 [IU]/ML
22 INJECTION, SOLUTION SUBCUTANEOUS EVERY MORNING
Qty: 5 PEN | Refills: 3 | Status: ON HOLD | OUTPATIENT
Start: 2022-01-09 | End: 2022-04-26 | Stop reason: SDUPTHER

## 2022-01-09 RX ADMIN — INSULIN LISPRO 3 UNITS: 100 INJECTION, SOLUTION INTRAVENOUS; SUBCUTANEOUS at 12:23

## 2022-01-09 RX ADMIN — INSULIN GLARGINE 22 UNITS: 100 INJECTION, SOLUTION SUBCUTANEOUS at 09:23

## 2022-01-09 RX ADMIN — INSULIN LISPRO 10 UNITS: 100 INJECTION, SOLUTION INTRAVENOUS; SUBCUTANEOUS at 07:45

## 2022-01-09 RX ADMIN — INSULIN LISPRO 7 UNITS: 100 INJECTION, SOLUTION INTRAVENOUS; SUBCUTANEOUS at 07:45

## 2022-01-09 ASSESSMENT — PAIN SCALES - GENERAL
PAINLEVEL_OUTOF10: 0
PAINLEVEL_OUTOF10: 0

## 2022-01-09 NOTE — DISCHARGE SUMMARY
[id number] [facility] EMERGENCY SERVICE Physician Discharge Summary       Jose Severino MD  [address]  [phone]    On 2/7/2022  endocrine follow up, Monday 2/7 at 2:00pm       Activity level: as abran    Diet: ADULT DIET; Regular; 4 carb choices (60 gm/meal)    Dispo:home    Condition at discharge: fair          Patient ID:  Tonya Chew  [id number]  6 y.o.  1995    Admit date: 1/7/2022    Discharge date and time:  1/9/2022  2:04 PM    Admission Diagnoses: Active Problems:    DKA, type 1, not at goal [facility])    Hypophosphatemia    Dehydration    Nausea and vomiting  Resolved Problems:    * No resolved hospital problems. *      Discharge Diagnoses: Active Problems:    DKA, type 1, not at goal [facility])    Hypophosphatemia    Dehydration    Nausea and vomiting  Resolved Problems:    * No resolved hospital problems. *    dka  Dm type 1 uncontrolled  acidosis  Hypophosphatemia  Dehydration  n/v        Consults:  IP CONSULT TO ENDOCRINOLOGY    Procedures: none    Hospital Course: Patient was admitted with DKA, type 1, not at goal ([address] [E10.10]. Patient is a 27 y.o. female with history of type 1 diabetes mellitus and multiple admissions for DKA with each concern of noncompliance who presented to the hospital complaining of lower back pain/nausea and vomiting associated with abdominal discomfort that started today the patient stated that she feels that she is in DKA because every time she gets a DKA at about sign symptoms, the patient was last admitted back in November for the same complaints, here in the hospital the patient was found to have a gap of 27 and bicarb of 7, Covid was negative, the patient was started on insulin drip and got accepted to the ICU will she will be admitted to continued insulin drip and IV fluids    Pt seen and examined by endocrinology and critical care. Pt treated in icu with insulin gtt along with iv fluids and lyte replacement as needed.  On day of discharge, pt denied fevers, chills,n/v. Discharge planning d/w pt. Time given for questions and all questions answered. Discussed importance of compliance to prevent sequale of dm. Discharge Exam:  Vitals:    01/09/22 0800 01/09/22 1000 01/09/22 1100 01/09/22 1200   BP: 112/76      Pulse: 116 109 100 104   Resp: (!) 34 19 11 15   Temp: 97.6 °F (36.4 °C)      TempSrc: Tympanic      SpO2: 98%      Weight:       Height:           Skin: warm and dry, no rash or erythema  Pulmonary/Chest: clear to auscultation bilaterally- no wheezes, rales or rhonchi, normal air movement, no respiratory distress  Cardiovascular: rhythm reg at rate of 104  Abdomen: soft, non-tender, non-distended, normal bowel sounds, no masses or organomegaly  Extremities: no cyanosis, no clubbing and no edema  I/O last 3 completed shifts: In: 9689 [P.O.:720; I.V.:618]  Out: -   I/O this shift: In: 720 [P.O.:720]  Out: -       LABS:  Recent Labs     01/08/22  0013 01/08/22  0700 01/09/22  0744    135 134   K 4.1 3.7 4.3   CL 99 106 102   CO2 7* 16* 18*   BUN 19 14 13   CREATININE 0.6 0.5 0.7   GLUCOSE 235* 147* 385*   CALCIUM 8.6 8.4* 8.7       Recent Labs     01/07/22  2235 01/08/22  0700 01/09/22  0744   WBC 9.5 8.3 7.5   RBC 5.25 4.26 4.29   HGB 14.7 12.1 12.3   HCT 48.1* 35.8 36.0   MCV 91.6 84.0 83.9   MCH 28.0 28.4 28.7   MCHC 30.6* 33.8 34.2   RDW 12.4 12.2 12.3    335 325   MPV 10.7 10.7 10.6       No results for input(s): POCGLU in the last 72 hours.     CBC with Differential:    Lab Results   Component Value Date    WBC 7.5 01/09/2022    RBC 4.29 01/09/2022    HGB 12.3 01/09/2022    HCT 36.0 01/09/2022     01/09/2022    MCV 83.9 01/09/2022    MCH 28.7 01/09/2022    MCHC 34.2 01/09/2022    RDW 12.3 01/09/2022    NRBC 0.0 05/10/2019    SEGSPCT 58 09/29/2013    METASPCT 1.0 04/29/2020    LYMPHOPCT 37.4 01/09/2022    MONOPCT 4.0 01/09/2022    MYELOPCT 1.0 04/29/2020    BASOPCT 0.4 01/09/2022    MONOSABS 0.30 01/09/2022    LYMPHSABS 2.82 01/09/2022    EOSABS 0.07 01/09/2022    BASOSABS 0.03 01/09/2022     BMP:    Lab Results   Component Value Date     01/09/2022    K 4.3 01/09/2022    K 4.1 01/08/2022     01/09/2022    CO2 18 01/09/2022    BUN 13 01/09/2022    LABALBU 3.7 01/08/2022    CREATININE 0.7 01/09/2022    CALCIUM 8.7 01/09/2022    GFRAA >60 01/09/2022    LABGLOM >60 01/09/2022    GLUCOSE 385 01/09/2022     Magnesium:    Lab Results   Component Value Date    MG 1.9 01/09/2022     Phosphorus:    Lab Results   Component Value Date    PHOS 2.2 01/09/2022       Imaging:   XR CHEST PORTABLE   Final Result   No evidence of active cardiopulmonary pathology. Patient Instructions:      Medication List      START taking these medications    BD Pen Needle Camila U/F 32G X 4 MM Misc  Generic drug: Insulin Pen Needle  Uses with insulin 4 times a day     insulin lispro (1 Unit Dial) 100 UNIT/ML Sopn  Commonly known as: HumaLOG KwikPen  Inject 1 units per 10 grams of carbs + following sliding scale. -200 add 2U, -250 add 4U, -300 add 6U, -350 add 8U, -400 add 12U, BS over 400 add 12U  Replaces: insulin lispro 100 UNIT/ML injection vial        CHANGE how you take these medications    Lantus SoloStar 100 UNIT/ML injection pen  Generic drug: insulin glargine  Inject 22 Units into the skin every morning  What changed: when to take this        CONTINUE taking these medications    acetaminophen 500 MG tablet  Commonly known as: TYLENOL  Take 1 tablet by mouth every 4 hours as needed for Pain or Fever        STOP taking these medications    insulin lispro 100 UNIT/ML injection vial  Commonly known as: HUMALOG  Replaced by: insulin lispro (1 Unit Dial) 100 UNIT/ML Sopn           Where to Get Your Medications      These medications were sent to [facility].  - P [phone] - F [phone]  [address] [place] [address], [facility] [address]    Phone: [phone]   · BD Pen Needle Camila U/F 32G X 4 MM Misc  · insulin lispro (1 Unit Dial) 100 UNIT/ML Sopn  · Lantus SoloStar 100 UNIT/ML injection pen           Total time for discharge is 37 min    Signed:  Electronically signed by Ha Ingram DO on 1/9/2022 at 2:04 PM

## 2022-01-09 NOTE — PROGRESS NOTES
Pt placed call light on felt her BS was low. Checked at 1130 was 69 given applejuice and lunch tray was here. Pt started eating. At 1150 while pt eating BS checked was 104. I had held the 7 units pre meal order. Dr Isaac Calvin was notified and he said to reach out to Dr Torre Comment to see if pre meal Humalog to be given awaiting response.

## 2022-01-10 ENCOUNTER — TELEPHONE (OUTPATIENT)
Dept: PRIMARY CARE CLINIC | Age: 27
End: 2022-01-10

## 2022-01-10 NOTE — TELEPHONE ENCOUNTER
[facility] Transitions Initial Follow Up Call    Outreach made within 2 business days of discharge: Yes    Patient: Maya Lewis Patient : 1995   MRN: [id number]  Reason for Admission:   Discharge Date: 22       Spoke with: Joy Sosa    Discharge department/facility: DKA, type 1, not at goal [facility])    TCM Interactive Patient Contact:  Was patient able to fill all prescriptions: Yes  Was patient instructed to bring all medications to the follow-up visit: Yes  Is patient taking all medications as directed in the discharge summary?  Yes  Does patient understand their discharge instructions: Yes  Does patient have questions or concerns that need addressed prior to 7-14 day follow up office visit: no    Scheduled appointment with PCP within 7-14 days    Follow Up  Future Appointments   Date Time Provider Joleen Silver   2022  3:30 PM DO Earl Murillo   2022  2:00 PM MIKE Henriquez [facility]   3/24/2022  1:45 PM Abdirahman Quiroz, [address] [place] [address]

## 2022-01-11 ENCOUNTER — VIRTUAL VISIT (OUTPATIENT)
Dept: PRIMARY CARE CLINIC | Age: 27
End: 2022-01-11
Payer: COMMERCIAL

## 2022-01-11 DIAGNOSIS — Z79.4 CONTROLLED TYPE 2 DIABETES MELLITUS WITH HYPERGLYCEMIA, WITH LONG-TERM CURRENT USE OF INSULIN (HCC): Primary | ICD-10-CM

## 2022-01-11 DIAGNOSIS — E11.65 CONTROLLED TYPE 2 DIABETES MELLITUS WITH HYPERGLYCEMIA, WITH LONG-TERM CURRENT USE OF INSULIN (HCC): Primary | ICD-10-CM

## 2022-01-11 DIAGNOSIS — G47.00 INSOMNIA, UNSPECIFIED TYPE: ICD-10-CM

## 2022-01-11 PROCEDURE — 99214 OFFICE O/P EST MOD 30 MIN: CPT | Performed by: INTERNAL MEDICINE

## 2022-01-11 PROCEDURE — 1111F DSCHRG MED/CURRENT MED MERGE: CPT | Performed by: INTERNAL MEDICINE

## 2022-01-11 RX ORDER — LANOLIN ALCOHOL/MO/W.PET/CERES
6 CREAM (GRAM) TOPICAL DAILY
Qty: 60 TABLET | Refills: 0 | Status: SHIPPED
Start: 2022-01-11 | End: 2022-08-15

## 2022-01-11 NOTE — PATIENT INSTRUCTIONS
 Learning About Sleeping Well   What does sleeping well mean?      Sleeping well means getting enough sleep. How much sleep is enough varies among people.  The number of hours you sleep is not as important as how you feel when you wake up. If you do not feel refreshed, you probably need more sleep. Another sign of not getting enough sleep is feeling tired during the day.  The average total nightly sleep time is 7½ to 8 hours. Healthy adults may need a little more or a little less than this.  Why is getting enough sleep important?  Getting enough quality sleep is a basic part of good health. When your sleep suffers, your mood and your thoughts can suffer too. You may find yourself feeling more grumpy or stressed. Not getting enough sleep also can lead to serious problems, including injury, accidents, anxiety, and depression.  What might cause poor sleeping?  Many things can cause sleep problems, including:   Stress. Stress can be caused by fear about a single event, such as giving a speech. Or you may have ongoing stress, such as worry about work or school.  Depression, anxiety, and other mental or emotional conditions.  Changes in your sleep habits or surroundings. This includes changes that happen where you sleep, such as noise, light, or sleeping in a different bed. It also includes changes in your sleep pattern, such as having jet lag or working a late shift.  Health problems, such as pain, breathing problems, and restless legs syndrome.  Lack of regular exercise.  How can you help yourself?  Here are some tips that may help you sleep more soundly and wake up feeling more refreshed.  Your sleeping area    Use your bedroom only for sleeping and sex. A bit of light reading may help you fall asleep. But if it doesn't, do your reading elsewhere in the house. Don't watch TV in bed.    Be sure your bed is big enough to stretch out comfortably, especially if you have a sleep Content Version: 12.8   © 4405-0084 [employer].  Care instructions adapted under license by [facility] ([facility]). If you have questions about a medical condition or this instruction, always ask your healthcare professional. Emily Duncan any warranty or liability for your use of this information.   Maria Victoria Jordan

## 2022-01-11 NOTE — PROGRESS NOTES
TeleMedicine Patient Consent    This visit was performed as a virtual video visit using a synchronous, two-way, audio-video telehealth technology platform. Patient identification was verified at the start of the visit, including the patient's telephone number and physical location. I discussed with the patient the nature of our telehealth visits, that:     1. Due to the nature of an audio- video modality, the only components of a physical exam that could be done are the elements supported by direct observation. 2. I would evaluate the patient and recommend diagnostics and treatments based on my assessment. 3. If it was felt that the patient should be evaluated in clinic or an emergency room setting, then they would be directed there. 4. Our sessions are not being recorded and that personal health information is protected. 5. Our team would provide follow up care in person if/when the patient needs it. Patient Does agree to proceed with telemedicine consultation. Patient's location: home address in [address] home address in [address] [place] [address]  Other people involved in call - None      Time spent: Not billed by time    This visit was completed virtually using D[employer]. me    Due to this being a TeleHealth encounter, evaluation of the following organ systems is limited: Vitals/Constitutional/EENT/Resp/CV/GI//MS/Neuro/Skin/Heme-Lymph-Imm. Pursuant to the emergency declaration under the [facility], 1135 waiver authority and the [employer] Act, this Virtual  Visit was conducted, with patient's consent, to reduce the patient's risk of exposure to COVID-19 and provide continuity of care for an established patient. Services were provided through a video synchronous discussion virtually to substitute for in-person clinic visit.       Please note that the above documentation was prepared using voice recognition software. Every attempt was made to ensure accuracy but there may be spelling, grammatical, and contextual errors. Electronically signed by Emily Barlow DO on 1/11/2022 at 3:54 PM    -------------------------------------------------------------------------------------------------------   Post-Discharge Transitional Care Management Services or Hospital Follow Up      Annie Her   YOB: 1995    Date of Office Visit:  1/11/2022  Date of Hospital Admission: 1/7/22  Date of Hospital Discharge: 1/9/22  Risk of hospital readmission (high >=14%.  Medium >=10%) :Readmission Risk Score: 16.9 ( )      Care management risk score Rising risk (score 2-5) and Complex Care (Scores >=6): 5     Non face to face  following discharge, date last encounter closed (first attempt may have been earlier): 1/10/2022  3:44 PM    Call initiated 2 business days of discharge: Yes    Patient Active Problem List   Diagnosis    DKA, type 1 ([address]    Diabetes mellitus type 1, uncontrolled ([address]    Personal history of noncompliance with medical treatment, presenting hazards to health    Leukocytosis    Elevated lactic acid level    Hyperglycemia    Pyelonephritis    Acidosis    Severe dehydration    Hypokalemia    Diabetic ketoacidosis without coma associated with type 1 diabetes mellitus ([address]    Hypoglycemia    Vitamin D deficiency    Uncontrolled type 1 diabetes mellitus with hyperglycemia (HCC)    Severe protein-calorie malnutrition (HCC)    Trichomoniasis    Lactic acid acidosis    DKA, type 1, not at goal [facility])    COVID-19 virus infection    Depressive disorder    Generalized abdominal pain    Nausea, vomiting and diarrhea    Hyperglycemia due to diabetes mellitus ([address]    Hypophosphatemia    Dehydration    Nausea and vomiting       No Known Allergies    Medications listed as ordered at the time of discharge from hospital     Medication List          Accurate as of January 11, 2022 3:54 PM. If you have any questions, ask your nurse or doctor. CONTINUE taking these medications    acetaminophen 500 MG tablet  Commonly known as: TYLENOL  Take 1 tablet by mouth every 4 hours as needed for Pain or Fever     BD Pen Needle Camila U/F 32G X 4 MM Misc  Generic drug: Insulin Pen Needle  Uses with insulin 4 times a day     insulin lispro (1 Unit Dial) 100 UNIT/ML Sopn  Commonly known as: HumaLOG KwikPen  Inject 1 units per 10 grams of carbs + following sliding scale. -200 add 2U, -250 add 4U, -300 add 6U, -350 add 8U, -400 add 12U, BS over 400 add 12U     Lantus SoloStar 100 UNIT/ML injection pen  Generic drug: insulin glargine  Inject 22 Units into the skin every morning              Medications marked \"taking\" at this time  No outpatient medications have been marked as taking for the 1/11/22 encounter (Virtual Visit) with Sia Razo DO.        Medications patient taking as of now reconciled against medications ordered at time of hospital discharge: Yes    Chief Complaint   Patient presents with    Follow-Up from Hospital       History of Present illness - Follow up of Hospital diagnosis(es):     Inpatient course: Discharge summary reviewed- see chart. Interval history/Current status:     She was recently admitted and discharged from the hospital on January 9. At that time she was admitted for diabetic ketoacidosis. She was started on insulin drip and admitted to the intensive care. She was seen by endocrinology who felt that her diabetes was uncontrolled due to her poor medication compliance and poor diet. Her medication regimen was augmented. A comprehensive review of systems was negative except for what was noted in the HPI. There were no vitals filed for this visit. There is no height or weight on file to calculate BMI.    Wt Readings from Last 3 Encounters:   01/07/22 108 lb (49 kg)   12/21/21 109 lb (49.4 kg)   11/16/21 100 lb (45.4 kg)     BP Readings from Last 3 Encounters:   01/09/22 112/76   12/22/21 116/77   11/19/21 116/72        Physical Exam:  General Appearance: alert and oriented to person, place and time, well developed and well- nourished, in no acute distress  Skin: warm and dry, no rash or erythema  Head: normocephalic and atraumatic  Eyes: pupils equal, round, and reactive to light, extraocular eye movements intact, conjunctivae normal  ENT: tympanic membrane, external ear and ear canal normal bilaterally, nose without deformity, nasal mucosa and turbinates normal without polyps  Neck: supple and non-tender without mass, no thyromegaly or thyroid nodules, no cervical lymphadenopathy  Pulmonary/Chest: clear to auscultation bilaterally- no wheezes, rales or rhonchi, normal air movement, no respiratory distress  Cardiovascular: normal rate, regular rhythm, normal S1 and S2, no murmurs, rubs, clicks, or gallops, distal pulses intact, no carotid bruits  Abdomen: soft, non-tender, non-distended, normal bowel sounds, no masses or organomegaly  Extremities: no cyanosis, clubbing or edema  Musculoskeletal: normal range of motion, no joint swelling, deformity or tenderness  Neurologic: reflexes normal and symmetric, no cranial nerve deficit, gait, coordination and speech normal    Assessment/Plan:  1. Controlled type 2 diabetes mellitus with hyperglycemia, with long-term current use of insulin (HCC)  - VA DISCHARGE MEDS RECONCILED W/ CURRENT OUTPATIENT MED LIST        Medical Decision Making: moderate complexity     She was seen in the hospital for DKA secondary to questionable medication compliance and dietary changes. The week prior she was suffering from a COVID infection. Today we discussed her sleep issues. I will send information in regards to sleep hygiene and also a prescription for melatonin for 3 mg doses to be split before bed and in the middle the night if she continues to have sleep issues.

## 2022-01-18 ENCOUNTER — TELEPHONE (OUTPATIENT)
Dept: ENDOCRINOLOGY | Age: 27
End: 2022-01-18

## 2022-01-18 NOTE — TELEPHONE ENCOUNTER
The pt called and stated that her Humalog and Lantus make her feel like she's in DKA 1hr after taking them. I asked her how her blood sugars were when this occurred. She stated that they were sometime high and sometimes low. She is going to keep track of them, and get back to us.

## 2022-02-11 ENCOUNTER — HOSPITAL ENCOUNTER (EMERGENCY)
Age: 27
Discharge: HOME | End: 2022-02-11
Payer: COMMERCIAL

## 2022-02-11 VITALS
WEIGHT: 107.4 LBS | TEMPERATURE: 97.4 F | DIASTOLIC BLOOD PRESSURE: 88 MMHG | SYSTOLIC BLOOD PRESSURE: 121 MMHG | HEART RATE: 100 BPM | RESPIRATION RATE: 18 BRPM | BODY MASS INDEX: 21.09 KG/M2 | HEIGHT: 60 IN | OXYGEN SATURATION: 100 %

## 2022-02-11 DIAGNOSIS — S00.83XA FACIAL CONTUSION, INITIAL ENCOUNTER: Primary | ICD-10-CM

## 2022-02-11 DIAGNOSIS — H57.11 PAIN AROUND RIGHT EYE: ICD-10-CM

## 2022-02-11 PROCEDURE — 6370000000 HC RX 637 (ALT 250 FOR IP): Performed by: NURSE PRACTITIONER

## 2022-02-11 PROCEDURE — 99283 EMERGENCY DEPT VISIT LOW MDM: CPT

## 2022-02-11 RX ORDER — TETRACAINE HYDROCHLORIDE 5 MG/ML
2 SOLUTION OPHTHALMIC ONCE
Status: COMPLETED | OUTPATIENT
Start: 2022-02-11 | End: 2022-02-11

## 2022-02-11 RX ADMIN — TETRACAINE HYDROCHLORIDE 2 DROP: 5 SOLUTION OPHTHALMIC at 22:35

## 2022-02-11 RX ADMIN — FLUORESCEIN SODIUM 1 EACH: 0.6 STRIP OPHTHALMIC at 22:35

## 2022-02-11 ASSESSMENT — PAIN SCALES - GENERAL: PAINLEVEL_OUTOF10: 8

## 2022-02-11 ASSESSMENT — PAIN DESCRIPTION - ORIENTATION: ORIENTATION: RIGHT

## 2022-02-11 ASSESSMENT — VISUAL ACUITY
OD: 70/20
OS: 70/20

## 2022-02-11 ASSESSMENT — PAIN DESCRIPTION - PAIN TYPE: TYPE: ACUTE PAIN

## 2022-02-11 ASSESSMENT — PAIN DESCRIPTION - LOCATION: LOCATION: EYE
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
## 2022-03-28 ENCOUNTER — HOSPITAL ENCOUNTER (EMERGENCY)
Age: 27
Discharge: HOME | End: 2022-03-29
Attending: EMERGENCY MEDICINE
Payer: COMMERCIAL

## 2022-03-28 DIAGNOSIS — R73.9 HYPERGLYCEMIA: ICD-10-CM

## 2022-03-28 DIAGNOSIS — R52 BODY ACHES: Primary | ICD-10-CM

## 2022-03-28 PROCEDURE — 96375 TX/PRO/DX INJ NEW DRUG ADDON: CPT

## 2022-03-28 PROCEDURE — 96374 THER/PROPH/DIAG INJ IV PUSH: CPT

## 2022-03-28 PROCEDURE — 99284 EMERGENCY DEPT VISIT MOD MDM: CPT

## 2022-03-28 RX ORDER — 0.9 % SODIUM CHLORIDE 0.9 %
1000 INTRAVENOUS SOLUTION INTRAVENOUS ONCE
Status: COMPLETED | OUTPATIENT
Start: 2022-03-29 | End: 2022-03-29

## 2022-03-28 RX ORDER — KETOROLAC TROMETHAMINE 30 MG/ML
30 INJECTION, SOLUTION INTRAMUSCULAR; INTRAVENOUS ONCE
Status: COMPLETED | OUTPATIENT
Start: 2022-03-29 | End: 2022-03-29

## 2022-03-28 ASSESSMENT — PAIN SCALES - GENERAL: PAINLEVEL_OUTOF10: 9

## 2022-03-28 ASSESSMENT — PAIN - FUNCTIONAL ASSESSMENT: PAIN_FUNCTIONAL_ASSESSMENT: 0-10

## 2022-03-28 ASSESSMENT — PAIN DESCRIPTION - PAIN TYPE: TYPE: ACUTE PAIN

## 2022-03-28 ASSESSMENT — PAIN DESCRIPTION - LOCATION: LOCATION: ABDOMEN;GENERALIZED

## 2022-03-29 VITALS
OXYGEN SATURATION: 99 % | HEIGHT: 61 IN | WEIGHT: 110 LBS | SYSTOLIC BLOOD PRESSURE: 112 MMHG | BODY MASS INDEX: 20.77 KG/M2 | DIASTOLIC BLOOD PRESSURE: 82 MMHG | RESPIRATION RATE: 14 BRPM | TEMPERATURE: 98.4 F | HEART RATE: 88 BPM

## 2022-03-29 LAB
ALBUMIN SERPL-MCNC: 3.6 G/DL (ref 3.5–5.2)
ALP BLD-CCNC: 111 U/L (ref 35–104)
ALT SERPL-CCNC: 14 U/L (ref 0–32)
ANION GAP SERPL CALCULATED.3IONS-SCNC: 13 MMOL/L (ref 7–16)
AST SERPL-CCNC: 15 U/L (ref 0–31)
BACTERIA: ABNORMAL /HPF
BILIRUB SERPL-MCNC: 0.4 MG/DL (ref 0–1.2)
BILIRUBIN URINE: NEGATIVE
BLOOD, URINE: ABNORMAL
BUN BLDV-MCNC: 9 MG/DL (ref 6–20)
CALCIUM SERPL-MCNC: 9.1 MG/DL (ref 8.6–10.2)
CHLORIDE BLD-SCNC: 101 MMOL/L (ref 98–107)
CLARITY: ABNORMAL
CO2: 21 MMOL/L (ref 22–29)
COLOR: YELLOW
CREAT SERPL-MCNC: 0.6 MG/DL (ref 0.5–1)
EPITHELIAL CELLS, UA: ABNORMAL /HPF
GFR AFRICAN AMERICAN: >60
GFR NON-AFRICAN AMERICAN: >60 ML/MIN/1.73
GLUCOSE BLD-MCNC: 301 MG/DL (ref 74–99)
GLUCOSE URINE: >=1000 MG/DL
HCG(URINE) PREGNANCY TEST: NEGATIVE
KETONES, URINE: 40 MG/DL
LEUKOCYTE ESTERASE, URINE: ABNORMAL
METER GLUCOSE: 229 MG/DL (ref 74–99)
NITRITE, URINE: NEGATIVE
PH UA: 6 (ref 5–9)
PH VENOUS: 7.37 (ref 7.35–7.45)
POTASSIUM REFLEX MAGNESIUM: 4.1 MMOL/L (ref 3.5–5)
PROTEIN UA: 30 MG/DL
RBC UA: ABNORMAL /HPF (ref 0–2)
SODIUM BLD-SCNC: 135 MMOL/L (ref 132–146)
SPECIFIC GRAVITY UA: 1.02 (ref 1–1.03)
TOTAL PROTEIN: 7.3 G/DL (ref 6.4–8.3)
UROBILINOGEN, URINE: 0.2 E.U./DL
WBC UA: >20 /HPF (ref 0–5)

## 2022-03-29 PROCEDURE — 99284 EMERGENCY DEPT VISIT MOD MDM: CPT

## 2022-03-29 PROCEDURE — 6360000002 HC RX W HCPCS: Performed by: EMERGENCY MEDICINE

## 2022-03-29 PROCEDURE — 81025 URINE PREGNANCY TEST: CPT

## 2022-03-29 PROCEDURE — 96374 THER/PROPH/DIAG INJ IV PUSH: CPT

## 2022-03-29 PROCEDURE — 2580000003 HC RX 258: Performed by: EMERGENCY MEDICINE

## 2022-03-29 PROCEDURE — 96375 TX/PRO/DX INJ NEW DRUG ADDON: CPT

## 2022-03-29 PROCEDURE — 82962 GLUCOSE BLOOD TEST: CPT

## 2022-03-29 PROCEDURE — 80053 COMPREHEN METABOLIC PANEL: CPT

## 2022-03-29 PROCEDURE — 82800 BLOOD PH: CPT

## 2022-03-29 PROCEDURE — 81001 URINALYSIS AUTO W/SCOPE: CPT

## 2022-03-29 PROCEDURE — 6370000000 HC RX 637 (ALT 250 FOR IP): Performed by: EMERGENCY MEDICINE

## 2022-03-29 RX ORDER — NAPROXEN 500 MG/1
500 TABLET ORAL 2 TIMES DAILY PRN
Qty: 60 TABLET | Refills: 0 | Status: SHIPPED | OUTPATIENT
Start: 2022-03-29 | End: 2022-08-15

## 2022-03-29 RX ORDER — ACETAMINOPHEN 325 MG/1
650 TABLET ORAL ONCE
Status: COMPLETED | OUTPATIENT
Start: 2022-03-29 | End: 2022-03-29

## 2022-03-29 RX ADMIN — SODIUM CHLORIDE 1000 ML: 9 INJECTION, SOLUTION INTRAVENOUS at 00:55

## 2022-03-29 RX ADMIN — KETOROLAC TROMETHAMINE 30 MG: 30 INJECTION, SOLUTION INTRAMUSCULAR at 00:44

## 2022-03-29 RX ADMIN — ACETAMINOPHEN 650 MG: 325 TABLET ORAL at 01:58

## 2022-03-29 RX ADMIN — INSULIN HUMAN 5 UNITS: 100 INJECTION, SOLUTION PARENTERAL at 02:06

## 2022-03-29 ASSESSMENT — PAIN SCALES - GENERAL
PAINLEVEL_OUTOF10: 8
PAINLEVEL_OUTOF10: 8

## 2022-03-29 NOTE — ED PROVIDER NOTES
pulses  Chest: No chest wall tenderness  GI:  Abdomen Soft, Non tender, Non distended. .   Musculoskeletal: Moves all extremities x 4. Warm and well perfused, no clubbing, cyanosis, or edema. Capillary refill <3 seconds  Integument: skin warm and dry. No rashes. Lymphatic: no lymphadenopathy noted  Neurologic: GCS 15, no focal deficits,   Psychiatric: Normal Affect    -------------------------------------------------- RESULTS -------------------------------------------------  I have personally reviewed all laboratory and imaging results for this patient. Results are listed below.      LABS:  Results for orders placed or performed during the hospital encounter of 03/28/22   Comprehensive Metabolic Panel w/ Reflex to MG   Result Value Ref Range    Sodium 135 132 - 146 mmol/L    Potassium reflex Magnesium 4.1 3.5 - 5.0 mmol/L    Chloride 101 98 - 107 mmol/L    CO2 21 (L) 22 - 29 mmol/L    Anion Gap 13 7 - 16 mmol/L    Glucose 301 (H) 74 - 99 mg/dL    BUN 9 6 - 20 mg/dL    CREATININE 0.6 0.5 - 1.0 mg/dL    GFR Non-African American >60 >=60 mL/min/1.73    GFR African American >60     Calcium 9.1 8.6 - 10.2 mg/dL    Total Protein 7.3 6.4 - 8.3 g/dL    Albumin 3.6 3.5 - 5.2 g/dL    Total Bilirubin 0.4 0.0 - 1.2 mg/dL    Alkaline Phosphatase 111 (H) 35 - 104 U/L    ALT 14 0 - 32 U/L    AST 15 0 - 31 U/L   Pregnancy, Urine   Result Value Ref Range    HCG(Urine) Pregnancy Test NEGATIVE NEGATIVE   Urinalysis with Microscopic   Result Value Ref Range    Color, UA Yellow Straw/Yellow    Clarity, UA CLOUDY (A) Clear    Glucose, Ur >=1000 (A) Negative mg/dL    Bilirubin Urine Negative Negative    Ketones, Urine 40 (A) Negative mg/dL    Specific Gravity, UA 1.025 1.005 - 1.030    Blood, Urine LARGE (A) Negative    pH, UA 6.0 5.0 - 9.0    Protein, UA 30 (A) Negative mg/dL    Urobilinogen, Urine 0.2 <2.0 E.U./dL    Nitrite, Urine Negative Negative    Leukocyte Esterase, Urine SMALL (A) Negative    WBC, UA >20 (A) 0 - 5 /HPF    RBC, UA 5-10 (A) 0 - 2 /HPF    Epithelial Cells, UA MODERATE /HPF    Bacteria, UA MANY (A) None Seen /HPF   PH, VENOUS   Result Value Ref Range    pH, Christoph 7.37 7.35 - 7.45   POCT Glucose   Result Value Ref Range    Meter Glucose 229 (H) 74 - 99 mg/dL       RADIOLOGY:  Interpreted by Radiologist.  No orders to display       EKG: This EKG is signed and interpreted by the EP. Time:   Rate:   Rhythm:   Interpretation:   Comparison:       ------------------------- NURSING NOTES AND VITALS REVIEWED ---------------------------   The nursing notes within the ED encounter and vital signs as below have been reviewed by myself. /82   Pulse 88   Temp 98.4 °F (36.9 °C) (Temporal)   Resp 14   Ht 5' 1\" (1.549 m)   Wt 110 lb (49.9 kg)   LMP 03/24/2022 (Exact Date)   SpO2 99%   BMI 20.78 kg/m²   Oxygen Saturation Interpretation: Normal    The patients available past medical records and past encounters were reviewed. ------------------------------ ED COURSE/MEDICAL DECISION MAKING----------------------  Medications   0.9 % sodium chloride bolus (0 mLs IntraVENous Stopped 3/29/22 0155)   ketorolac (TORADOL) injection 30 mg (30 mg IntraVENous Given 3/29/22 0044)   acetaminophen (TYLENOL) tablet 650 mg (650 mg Oral Given 3/29/22 0158)   insulin regular (HUMULIN R;NOVOLIN R) injection 5 Units (5 Units IntraVENous Given 3/29/22 0206)         ED COURSE:       Medical Decision Making:    Associated with menses, exam benign, non toxic, glucose improved, ph nml w/o gap, not in dka, tx supp with outpt fu      This patient's ED course included: a personal history and physicial examination    This patient has remained hemodynamically stable during their ED course. Re-Evaluations:             Re-evaluation.   Patients symptoms are improving    Re-examination  3/29/22   1 aM EDT          Vital Signs:   Vitals:    03/28/22 2216 03/29/22 0315   BP: 114/80 112/82   Pulse: 110 88   Resp: 14 14   Temp: 98.4 °F (36.9 °C) TempSrc: Temporal    SpO2: 99% 99%   Weight: 110 lb (49.9 kg)    Height: 5' 1\" (1.549 m)              Counseling: The emergency provider has spoken with the patient and discussed todays results, in addition to providing specific details for the plan of care and counseling regarding the diagnosis and prognosis. Questions are answered at this time and they are agreeable with the plan.       --------------------------------- IMPRESSION AND DISPOSITION ---------------------------------    IMPRESSION  1. Body aches    2. Hyperglycemia        DISPOSITION  Disposition: Discharge to home  Patient condition is stable    NOTE: This report was transcribed using voice recognition software.  Every effort was made to ensure accuracy; however, inadvertent computerized transcription errors may be present        Fidelia Mcqueen MD  03/29/22 7681

## 2022-04-02 ENCOUNTER — HOSPITAL ENCOUNTER (EMERGENCY)
Age: 27
Discharge: LEFT BEFORE DISCHARGE | End: 2022-04-03
Attending: STUDENT IN AN ORGANIZED HEALTH CARE EDUCATION/TRAINING PROGRAM
Payer: COMMERCIAL

## 2022-04-02 ENCOUNTER — APPOINTMENT (OUTPATIENT)
Dept: CT IMAGING | Age: 27
End: 2022-04-02
Payer: COMMERCIAL

## 2022-04-02 VITALS
TEMPERATURE: 98.1 F | HEIGHT: 61 IN | RESPIRATION RATE: 18 BRPM | DIASTOLIC BLOOD PRESSURE: 70 MMHG | SYSTOLIC BLOOD PRESSURE: 120 MMHG | OXYGEN SATURATION: 100 % | BODY MASS INDEX: 20.77 KG/M2 | WEIGHT: 110 LBS | HEART RATE: 82 BPM

## 2022-04-02 DIAGNOSIS — N73.0 ACUTE PELVIC INFLAMMATORY DISEASE (PID): Primary | ICD-10-CM

## 2022-04-02 DIAGNOSIS — R10.84 GENERALIZED ABDOMINAL PAIN: ICD-10-CM

## 2022-04-02 DIAGNOSIS — R73.9 HYPERGLYCEMIA: ICD-10-CM

## 2022-04-02 DIAGNOSIS — K59.00 CONSTIPATION, UNSPECIFIED CONSTIPATION TYPE: ICD-10-CM

## 2022-04-02 LAB
ALBUMIN SERPL-MCNC: 3.4 G/DL (ref 3.5–5.2)
ALP BLD-CCNC: 107 U/L (ref 35–104)
ALT SERPL-CCNC: 10 U/L (ref 0–32)
ANION GAP SERPL CALCULATED.3IONS-SCNC: 15 MMOL/L (ref 7–16)
AST SERPL-CCNC: 16 U/L (ref 0–31)
BACTERIA: ABNORMAL /HPF
BASOPHILS ABSOLUTE: 0.03 E9/L (ref 0–0.2)
BASOPHILS RELATIVE PERCENT: 0.3 % (ref 0–2)
BETA-HYDROXYBUTYRATE: 1.96 MMOL/L (ref 0.02–0.27)
BILIRUB SERPL-MCNC: 0.4 MG/DL (ref 0–1.2)
BILIRUBIN URINE: NEGATIVE
BLOOD, URINE: ABNORMAL
BUN BLDV-MCNC: 7 MG/DL (ref 6–20)
CALCIUM SERPL-MCNC: 9.2 MG/DL (ref 8.6–10.2)
CHLORIDE BLD-SCNC: 98 MMOL/L (ref 98–107)
CLARITY: ABNORMAL
CLUE CELLS: NORMAL
CO2: 21 MMOL/L (ref 22–29)
COLOR: ABNORMAL
CREAT SERPL-MCNC: 0.5 MG/DL (ref 0.5–1)
EOSINOPHILS ABSOLUTE: 0.02 E9/L (ref 0.05–0.5)
EOSINOPHILS RELATIVE PERCENT: 0.2 % (ref 0–6)
GFR AFRICAN AMERICAN: >60
GFR NON-AFRICAN AMERICAN: >60 ML/MIN/1.73
GLUCOSE BLD-MCNC: 319 MG/DL
GLUCOSE BLD-MCNC: 331 MG/DL (ref 74–99)
GLUCOSE URINE: >=1000 MG/DL
HCG(URINE) PREGNANCY TEST: NEGATIVE
HCT VFR BLD CALC: 35.7 % (ref 34–48)
HEMOGLOBIN: 12.1 G/DL (ref 11.5–15.5)
IMMATURE GRANULOCYTES #: 0.02 E9/L
IMMATURE GRANULOCYTES %: 0.2 % (ref 0–5)
KETONES, URINE: 15 MG/DL
LACTIC ACID: 1.3 MMOL/L (ref 0.5–2.2)
LEUKOCYTE ESTERASE, URINE: NEGATIVE
LIPASE: 9 U/L (ref 13–60)
LYMPHOCYTES ABSOLUTE: 2.03 E9/L (ref 1.5–4)
LYMPHOCYTES RELATIVE PERCENT: 19.4 % (ref 20–42)
MCH RBC QN AUTO: 28 PG (ref 26–35)
MCHC RBC AUTO-ENTMCNC: 33.9 % (ref 32–34.5)
MCV RBC AUTO: 82.6 FL (ref 80–99.9)
MONOCYTES ABSOLUTE: 0.63 E9/L (ref 0.1–0.95)
MONOCYTES RELATIVE PERCENT: 6 % (ref 2–12)
NEUTROPHILS ABSOLUTE: 7.71 E9/L (ref 1.8–7.3)
NEUTROPHILS RELATIVE PERCENT: 73.9 % (ref 43–80)
NITRITE, URINE: NEGATIVE
PDW BLD-RTO: 12.2 FL (ref 11.5–15)
PH UA: 6 (ref 5–9)
PH VENOUS: 7.39 (ref 7.35–7.45)
PLATELET # BLD: 324 E9/L (ref 130–450)
PMV BLD AUTO: 10.2 FL (ref 7–12)
POTASSIUM REFLEX MAGNESIUM: 4.1 MMOL/L (ref 3.5–5)
PROTEIN UA: NEGATIVE MG/DL
RBC # BLD: 4.32 E12/L (ref 3.5–5.5)
RBC UA: ABNORMAL /HPF (ref 0–2)
SODIUM BLD-SCNC: 134 MMOL/L (ref 132–146)
SOURCE WET PREP: NORMAL
SPECIFIC GRAVITY UA: 1.01 (ref 1–1.03)
TOTAL PROTEIN: 7.3 G/DL (ref 6.4–8.3)
TRICHOMONAS PREP: NORMAL
UROBILINOGEN, URINE: 0.2 E.U./DL
WBC # BLD: 10.4 E9/L (ref 4.5–11.5)
WBC UA: ABNORMAL /HPF (ref 0–5)
YEAST WET PREP: NORMAL

## 2022-04-02 PROCEDURE — 87210 SMEAR WET MOUNT SALINE/INK: CPT

## 2022-04-02 PROCEDURE — 81025 URINE PREGNANCY TEST: CPT

## 2022-04-02 PROCEDURE — 6360000002 HC RX W HCPCS: Performed by: STUDENT IN AN ORGANIZED HEALTH CARE EDUCATION/TRAINING PROGRAM

## 2022-04-02 PROCEDURE — 2580000003 HC RX 258: Performed by: STUDENT IN AN ORGANIZED HEALTH CARE EDUCATION/TRAINING PROGRAM

## 2022-04-02 PROCEDURE — 83605 ASSAY OF LACTIC ACID: CPT

## 2022-04-02 PROCEDURE — 93005 ELECTROCARDIOGRAM TRACING: CPT | Performed by: STUDENT IN AN ORGANIZED HEALTH CARE EDUCATION/TRAINING PROGRAM

## 2022-04-02 PROCEDURE — 96372 THER/PROPH/DIAG INJ SC/IM: CPT

## 2022-04-02 PROCEDURE — 81001 URINALYSIS AUTO W/SCOPE: CPT

## 2022-04-02 PROCEDURE — 96374 THER/PROPH/DIAG INJ IV PUSH: CPT

## 2022-04-02 PROCEDURE — 85025 COMPLETE CBC W/AUTO DIFF WBC: CPT

## 2022-04-02 PROCEDURE — 87591 N.GONORRHOEAE DNA AMP PROB: CPT

## 2022-04-02 PROCEDURE — 6370000000 HC RX 637 (ALT 250 FOR IP): Performed by: STUDENT IN AN ORGANIZED HEALTH CARE EDUCATION/TRAINING PROGRAM

## 2022-04-02 PROCEDURE — 99285 EMERGENCY DEPT VISIT HI MDM: CPT

## 2022-04-02 PROCEDURE — 87491 CHLMYD TRACH DNA AMP PROBE: CPT

## 2022-04-02 PROCEDURE — 82800 BLOOD PH: CPT

## 2022-04-02 PROCEDURE — 2500000003 HC RX 250 WO HCPCS: Performed by: STUDENT IN AN ORGANIZED HEALTH CARE EDUCATION/TRAINING PROGRAM

## 2022-04-02 PROCEDURE — 83690 ASSAY OF LIPASE: CPT

## 2022-04-02 PROCEDURE — 82010 KETONE BODYS QUAN: CPT

## 2022-04-02 PROCEDURE — 80053 COMPREHEN METABOLIC PANEL: CPT

## 2022-04-02 RX ORDER — 0.9 % SODIUM CHLORIDE 0.9 %
1000 INTRAVENOUS SOLUTION INTRAVENOUS ONCE
Status: COMPLETED | OUTPATIENT
Start: 2022-04-02 | End: 2022-04-02

## 2022-04-02 RX ORDER — ONDANSETRON 2 MG/ML
4 INJECTION INTRAMUSCULAR; INTRAVENOUS ONCE
Status: COMPLETED | OUTPATIENT
Start: 2022-04-02 | End: 2022-04-02

## 2022-04-02 RX ORDER — 0.9 % SODIUM CHLORIDE 0.9 %
1000 INTRAVENOUS SOLUTION INTRAVENOUS ONCE
Status: COMPLETED | OUTPATIENT
Start: 2022-04-02 | End: 2022-04-03

## 2022-04-02 RX ORDER — DOXYCYCLINE HYCLATE 100 MG/1
100 CAPSULE ORAL ONCE
Status: COMPLETED | OUTPATIENT
Start: 2022-04-02 | End: 2022-04-02

## 2022-04-02 RX ORDER — METRONIDAZOLE 500 MG/1
2000 TABLET ORAL ONCE
Status: COMPLETED | OUTPATIENT
Start: 2022-04-02 | End: 2022-04-02

## 2022-04-02 RX ADMIN — DOXYCYCLINE HYCLATE 100 MG: 100 CAPSULE ORAL at 21:57

## 2022-04-02 RX ADMIN — ONDANSETRON 4 MG: 2 INJECTION INTRAMUSCULAR; INTRAVENOUS at 21:20

## 2022-04-02 RX ADMIN — LIDOCAINE HYDROCHLORIDE 500 MG: 10 INJECTION, SOLUTION EPIDURAL; INFILTRATION; INTRACAUDAL; PERINEURAL at 21:57

## 2022-04-02 RX ADMIN — METRONIDAZOLE 2000 MG: 500 TABLET ORAL at 21:56

## 2022-04-02 RX ADMIN — SODIUM CHLORIDE 1000 ML: 9 INJECTION, SOLUTION INTRAVENOUS at 21:21

## 2022-04-02 ASSESSMENT — PAIN DESCRIPTION - FREQUENCY: FREQUENCY: CONTINUOUS

## 2022-04-02 ASSESSMENT — PAIN SCALES - GENERAL
PAINLEVEL_OUTOF10: 10
PAINLEVEL_OUTOF10: 0

## 2022-04-02 ASSESSMENT — PAIN DESCRIPTION - LOCATION: LOCATION: ABDOMEN

## 2022-04-02 ASSESSMENT — PAIN DESCRIPTION - ORIENTATION: ORIENTATION: LOWER

## 2022-04-03 ENCOUNTER — APPOINTMENT (OUTPATIENT)
Dept: CT IMAGING | Age: 27
End: 2022-04-03
Payer: COMMERCIAL

## 2022-04-03 LAB
EKG ATRIAL RATE: 106 BPM
EKG P AXIS: 57 DEGREES
EKG P-R INTERVAL: 140 MS
EKG Q-T INTERVAL: 328 MS
EKG QRS DURATION: 62 MS
EKG QTC CALCULATION (BAZETT): 435 MS
EKG R AXIS: 34 DEGREES
EKG T AXIS: 66 DEGREES
EKG VENTRICULAR RATE: 106 BPM
GLUCOSE BLD-MCNC: 415 MG/DL
GLUCOSE BLD-MCNC: 440 MG/DL
METER GLUCOSE: 415 MG/DL (ref 74–99)
METER GLUCOSE: 440 MG/DL (ref 74–99)

## 2022-04-03 PROCEDURE — 82962 GLUCOSE BLOOD TEST: CPT

## 2022-04-03 PROCEDURE — 93010 ELECTROCARDIOGRAM REPORT: CPT | Performed by: INTERNAL MEDICINE

## 2022-04-03 PROCEDURE — 96372 THER/PROPH/DIAG INJ SC/IM: CPT

## 2022-04-03 PROCEDURE — 2580000003 HC RX 258: Performed by: STUDENT IN AN ORGANIZED HEALTH CARE EDUCATION/TRAINING PROGRAM

## 2022-04-03 PROCEDURE — 96361 HYDRATE IV INFUSION ADD-ON: CPT

## 2022-04-03 PROCEDURE — 74177 CT ABD & PELVIS W/CONTRAST: CPT

## 2022-04-03 PROCEDURE — 6370000000 HC RX 637 (ALT 250 FOR IP): Performed by: STUDENT IN AN ORGANIZED HEALTH CARE EDUCATION/TRAINING PROGRAM

## 2022-04-03 PROCEDURE — 6360000002 HC RX W HCPCS: Performed by: STUDENT IN AN ORGANIZED HEALTH CARE EDUCATION/TRAINING PROGRAM

## 2022-04-03 PROCEDURE — 6360000004 HC RX CONTRAST MEDICATION: Performed by: RADIOLOGY

## 2022-04-03 PROCEDURE — 96375 TX/PRO/DX INJ NEW DRUG ADDON: CPT

## 2022-04-03 RX ORDER — DOCUSATE SODIUM 100 MG/1
100 CAPSULE, LIQUID FILLED ORAL 2 TIMES DAILY
Qty: 60 CAPSULE | Refills: 0 | Status: SHIPPED | OUTPATIENT
Start: 2022-04-03 | End: 2022-05-03

## 2022-04-03 RX ORDER — INSULIN GLARGINE 100 [IU]/ML
0.1 INJECTION, SOLUTION SUBCUTANEOUS ONCE
Status: COMPLETED | OUTPATIENT
Start: 2022-04-03 | End: 2022-04-03

## 2022-04-03 RX ORDER — 0.9 % SODIUM CHLORIDE 0.9 %
1000 INTRAVENOUS SOLUTION INTRAVENOUS ONCE
Status: COMPLETED | OUTPATIENT
Start: 2022-04-03 | End: 2022-04-03

## 2022-04-03 RX ORDER — DOXYCYCLINE HYCLATE 100 MG
100 TABLET ORAL 2 TIMES DAILY
Qty: 28 TABLET | Refills: 0 | Status: SHIPPED | OUTPATIENT
Start: 2022-04-03 | End: 2022-04-17

## 2022-04-03 RX ORDER — POLYETHYLENE GLYCOL 3350 17 G/17G
17 POWDER, FOR SOLUTION ORAL DAILY PRN
Qty: 510 G | Refills: 0 | Status: SHIPPED | OUTPATIENT
Start: 2022-04-03 | End: 2022-05-03

## 2022-04-03 RX ORDER — KETOROLAC TROMETHAMINE 30 MG/ML
15 INJECTION, SOLUTION INTRAMUSCULAR; INTRAVENOUS ONCE
Status: COMPLETED | OUTPATIENT
Start: 2022-04-03 | End: 2022-04-03

## 2022-04-03 RX ADMIN — SODIUM CHLORIDE 1000 ML: 9 INJECTION, SOLUTION INTRAVENOUS at 00:04

## 2022-04-03 RX ADMIN — IOPAMIDOL 75 ML: 755 INJECTION, SOLUTION INTRAVENOUS at 02:29

## 2022-04-03 RX ADMIN — KETOROLAC TROMETHAMINE 15 MG: 30 INJECTION, SOLUTION INTRAMUSCULAR at 02:54

## 2022-04-03 RX ADMIN — INSULIN GLARGINE 5 UNITS: 100 INJECTION, SOLUTION SUBCUTANEOUS at 02:36

## 2022-04-03 RX ADMIN — SODIUM CHLORIDE 1000 ML: 9 INJECTION, SOLUTION INTRAVENOUS at 02:56

## 2022-04-03 ASSESSMENT — PAIN SCALES - GENERAL: PAINLEVEL_OUTOF10: 10

## 2022-04-03 NOTE — ED PROVIDER NOTES
Department of Emergency Medicine   ED  Provider Note  Admit Date/RoomTime: 4/2/2022  8:24 PM  ED Room: 02/02          History of Present Illness:  4/2/22, Time: 8:45 PM EDT  Chief Complaint   Patient presents with    Abdominal Pain     Started two weeks ago    Back Pain    Vaginal Discharge     Cream color          Carey Blair is a 27 y.o. female presenting to the ED for abdominal pain, back pain, vaginal discharge, beginning two weeks ago. The complaint has been persistent, moderate in severity, and worsened by nothing. The patient is a 81-year-old female with a history of type 1 diabetes on insulin who presents to the emergency department complaining of abdominal pain, back pain, and vaginal discharge. The patient symptoms were sudden onset 2 weeks ago, has been persistent, moderate in severity, and nothing makes it better or worse. She describes the pain as aching in her lower abdomen and radiating up into her upper abdomen into her lower back. She states that her vaginal discharge has been white and cream-colored. She is concerned about STDs. She states that her last period was 2 weeks ago and was normal for her but that is when all this pain started. She states that her blood sugars have been fluctuating because she has been nauseated intermittently with episodes of vomiting at times. She states her blood sugar prior to arrival was about 300. She did go to urgent care and they recommended her to come here. She denies any diarrhea, dysuria, hematuria, vaginal bleeding, chest pain, shortness of breath, recent hospitalization, recent illness, or other acute symptoms or concerns.      Review of Systems:   A complete review of systems was performed and pertinent positives and negatives are stated within HPI, all other systems reviewed and are negative.        --------------------------------------------- PAST HISTORY ---------------------------------------------  Past Medical History:  has a past medical history of Bleeding at insertion site, Common femoral artery injury, right, initial encounter, Depressive disorder, Diabetic ketoacidosis ([facility] [address] DM type 1 (diabetes mellitus, type 1) ([facility] [address] Marijuana abuse, Non-compliance, and Trichimoniasis. Past Surgical History:  has a past surgical history that includes Abdomen surgery (N/A, 5/9/2019) and Bartholin gland cyst excision. Social History:  reports that she has never smoked. She has never used smokeless tobacco. She reports previous drug use. Drug: Marijuana Carolyn Kales). She reports that she does not drink alcohol. Family History: family history includes Diabetes in her maternal grandmother and paternal grandmother; Stroke in an other family member. . Unless otherwise noted, family history is non contributory    The patients home medications have been reviewed. Allergies: Patient has no known allergies. I have reviewed the past medical history, past surgical history, social history, and family history    ---------------------------------------------------PHYSICAL EXAM--------------------------------------    Constitutional/General: Alert and oriented x3, sitting up in no acute distress. Head: Normocephalic and atraumatic  Eyes:  EOMI, sclera non icteric  ENT: Oropharynx clear, handling secretions, no trismus, no asymmetry of the posterior oropharynx or uvular edema  Neck: Supple, full ROM, no stridor, no meningeal signs  Respiratory: Lungs clear to auscultation bilaterally, no wheezes, rales, or rhonchi. Not in respiratory distress  Cardiovascular: Tachycardic rate. Regular rhythm. No murmurs, no gallops, no rubs. 2+ distal pulses. Equal extremity pulses. Gastrointestinal: Mild tenderness palpation of the lower abdomen. There is no rigidity or rebound tenderness or guarding. : Moderate amount of greenish yellow foul smelling discharge. No CMT. Musculoskeletal: Moves all extremities x 4.  Warm and well perfused, no clubbing, no cyanosis, no edema. Capillary refill <3 seconds  Skin: skin warm and dry. No rashes. Neurologic: GCS 15, no focal deficits, symmetric strength 5/5 in the upper and lower extremities bilaterally  Psychiatric: Normal Affect      -------------------------------------------------- RESULTS -------------------------------------------------  I have personally reviewed all laboratory and imaging results for this patient. Results are listed below.      LABS: (Lab results interpreted by me)  Results for orders placed or performed during the hospital encounter of 04/02/22   Wet prep, genital    Specimen: Vaginal   Result Value Ref Range    Trichomonas Prep None Seen     Yeast, Wet Prep None Seen     Clue Cells, Wet Prep None Seen     Source Wet Prep VAGINAL    C.trachomatis N.gonorrhoeae DNA    Specimen: Cervix   Result Value Ref Range    Source Cervix    CBC with Auto Differential   Result Value Ref Range    WBC 10.4 4.5 - 11.5 E9/L    RBC 4.32 3.50 - 5.50 E12/L    Hemoglobin 12.1 11.5 - 15.5 g/dL    Hematocrit 35.7 34.0 - 48.0 %    MCV 82.6 80.0 - 99.9 fL    MCH 28.0 26.0 - 35.0 pg    MCHC 33.9 32.0 - 34.5 %    RDW 12.2 11.5 - 15.0 fL    Platelets 832 411 - 580 E9/L    MPV 10.2 7.0 - 12.0 fL    Neutrophils % 73.9 43.0 - 80.0 %    Immature Granulocytes % 0.2 0.0 - 5.0 %    Lymphocytes % 19.4 (L) 20.0 - 42.0 %    Monocytes % 6.0 2.0 - 12.0 %    Eosinophils % 0.2 0.0 - 6.0 %    Basophils % 0.3 0.0 - 2.0 %    Neutrophils Absolute 7.71 (H) 1.80 - 7.30 E9/L    Immature Granulocytes # 0.02 E9/L    Lymphocytes Absolute 2.03 1.50 - 4.00 E9/L    Monocytes Absolute 0.63 0.10 - 0.95 E9/L    Eosinophils Absolute 0.02 (L) 0.05 - 0.50 E9/L    Basophils Absolute 0.03 0.00 - 0.20 E9/L   Comprehensive Metabolic Panel w/ Reflex to MG   Result Value Ref Range    Sodium 134 132 - 146 mmol/L    Potassium reflex Magnesium 4.1 3.5 - 5.0 mmol/L    Chloride 98 98 - 107 mmol/L    CO2 21 (L) 22 - 29 mmol/L    Anion Gap 15 7 - 16 mmol/L    Glucose 331 (H) 74 - 99 mg/dL    BUN 7 6 - 20 mg/dL    CREATININE 0.5 0.5 - 1.0 mg/dL    GFR Non-African American >60 >=60 mL/min/1.73    GFR African American >60     Calcium 9.2 8.6 - 10.2 mg/dL    Total Protein 7.3 6.4 - 8.3 g/dL    Albumin 3.4 (L) 3.5 - 5.2 g/dL    Total Bilirubin 0.4 0.0 - 1.2 mg/dL    Alkaline Phosphatase 107 (H) 35 - 104 U/L    ALT 10 0 - 32 U/L    AST 16 0 - 31 U/L   Urinalysis with Microscopic   Result Value Ref Range    Color, UA Straw Straw/Yellow    Clarity, UA SL CLOUDY Clear    Glucose, Ur >=1000 (A) Negative mg/dL    Bilirubin Urine Negative Negative    Ketones, Urine 15 (A) Negative mg/dL    Specific Gravity, UA 1.010 1.005 - 1.030    Blood, Urine TRACE-INTACT Negative    pH, UA 6.0 5.0 - 9.0    Protein, UA Negative Negative mg/dL    Urobilinogen, Urine 0.2 <2.0 E.U./dL    Nitrite, Urine Negative Negative    Leukocyte Esterase, Urine Negative Negative    WBC, UA NONE 0 - 5 /HPF    RBC, UA NONE 0 - 2 /HPF    Bacteria, UA FEW (A) None Seen /HPF   Lactic Acid   Result Value Ref Range    Lactic Acid 1.3 0.5 - 2.2 mmol/L   Lipase   Result Value Ref Range    Lipase 9 (L) 13 - 60 U/L   Beta-Hydroxybutyrate   Result Value Ref Range    Beta-Hydroxybutyrate 1.96 (H) 0.02 - 0.27 mmol/L   PH, VENOUS   Result Value Ref Range    pH, Christoph 7.39 7.35 - 7.45   Pregnancy, urine   Result Value Ref Range    HCG(Urine) Pregnancy Test NEGATIVE NEGATIVE   POCT glucose   Result Value Ref Range    Glucose 319 mg/dL   POCT glucose   Result Value Ref Range    Glucose 415 mg/dL   POCT Glucose   Result Value Ref Range    Meter Glucose 415 (H) 74 - 99 mg/dL   POCT glucose   Result Value Ref Range    Glucose 440 mg/dL   POCT Glucose   Result Value Ref Range    Meter Glucose 440 (H) 74 - 99 mg/dL   EKG 12 Lead   Result Value Ref Range    Ventricular Rate 106 BPM    Atrial Rate 106 BPM    P-R Interval 140 ms    QRS Duration 62 ms    Q-T Interval 328 ms    QTc Calculation (Bazett) 435 ms    P Axis 57 degrees    R Axis 34 degrees T Axis 66 degrees   ,       RADIOLOGY:  Interpreted by Radiologist unless otherwise specified  CT ABDOMEN PELVIS W IV CONTRAST Additional Contrast? None   Final Result   Large amount of stool in portions of the left colon. Areas of thickening of the bowel including the gastric antrum, portions of   the colon and small bowel. Findings are suggestive of gastro enteritis but   should be correlated clinically. Distended urinary bladder. Minimal free fluid in the pelvis could be physiologic or due to the apparent   gastro enteritis. Short-term follow-up recommended if symptoms persist.      RECOMMENDATIONS:   Unavailable               EKG Interpretation  Interpreted by emergency department physician, Dr. Jose Mccormick    EKG: This EKG is signed and interpreted by me. Rate: 106  Rhythm: Sinus  Interpretation: Sinus tachycardia, normal axis, no acute ST elevations or depressions, intervals are within normal limits, QTC is 435  Comparison: stable as compared to patient's most recent EKG       ------------------------- NURSING NOTES AND VITALS REVIEWED ---------------------------   The nursing notes within the ED encounter and vital signs as below have been reviewed by myself  /70   Pulse 82   Temp 98.1 °F (36.7 °C) (Oral)   Resp 18   Ht 5' 1\" (1.549 m)   Wt 110 lb (49.9 kg)   LMP 03/24/2022 (Exact Date)   SpO2 100%   BMI 20.78 kg/m²     Oxygen Saturation Interpretation: Normal    The patients available past medical records and past encounters were reviewed.         ------------------------------ ED COURSE/MEDICAL DECISION MAKING----------------------  Medications   0.9 % sodium chloride bolus (0 mLs IntraVENous Stopped 4/2/22 2219)   ondansetron (ZOFRAN) injection 4 mg (4 mg IntraVENous Given 4/2/22 2120)   cefTRIAXone (ROCEPHIN) 500 mg in lidocaine 1 % 1.43 mL IM Injection (500 mg IntraMUSCular Given 4/2/22 2157)   doxycycline hyclate (VIBRAMYCIN) capsule 100 mg (100 mg Oral Given 4/2/22 change. This patient has chosen to leave against medical advice. I, Dr. Darlene Braga, the Emergency Physician has personally explained to them that choosing to do so may result in permanent bodily harm or death. Discussed at length that without further evaluation and monitoring there may be unforeseen circumstances and deterioration causing permanent bodily harm or death as a result of their choice. They are alert, oriented, and have the capacity and are competent at this time to make this decision. They state that they are aware of the serious risks as explained, but they continue to wish to leave against medical advice. In light of their decision to leave against medical advice, follow-up has been arranged and they are aware of the importance of following up as instructed. They have been advised that they should return to the ED immediately if they change their mind at any time, or if their condition begins to change or worsen. Oxygen Saturation Interpretation: 100 % on room air. Re-Evaluations:       Patient resting comfortably in no distress on reassessment. She examined bed and eating      This patient's ED course included: a personal history and physicial examination, re-evaluation prior to disposition, multiple bedside re-evaluations, IV medications, cardiac monitoring, continuous pulse oximetry and complex medical decision making and emergency management    This patient has remained hemodynamically stable during their ED course. Counseling: The emergency provider has spoken with the patient and discussed todays results, in addition to providing specific details for the plan of care and counseling regarding the diagnosis and prognosis. Questions are answered at this time and they are agreeable with the plan.       --------------------------------- IMPRESSION AND DISPOSITION ---------------------------------    IMPRESSION  1. Acute pelvic inflammatory disease (PID)    2. Generalized abdominal pain    3. Constipation, unspecified constipation type    4. Hyperglycemia        DISPOSITION  Disposition: Other Disposition: Left AMA  Patient condition is stable        NOTE: This report was transcribed using voice recognition software.  Every effort was made to ensure accuracy; however, inadvertent computerized transcription errors may be present       Krsiti Hanley DO  04/04/22 8212

## 2022-04-03 NOTE — ED NOTES
Pt states that she has been having abdominal pain off and on for the past 2 weeks, she states that she has been having periods of nausea and vomitting, she is alert times 3, resp easy lungs are clear a and p. 0 edema      Bina Muir, PAGE  04/02/22 2045

## 2022-04-03 NOTE — ED NOTES
[facility] notified of elevated POC glucose level of 415; new orders entered.      [facility]  04/03/22 2099

## 2022-04-06 LAB
C TRACH DNA GENITAL QL NAA+PROBE: NEGATIVE
N. GONORRHOEAE DNA: ABNORMAL
SOURCE: ABNORMAL

## 2022-04-24 ENCOUNTER — HOSPITAL ENCOUNTER (INPATIENT)
Age: 27
LOS: 2 days | Discharge: HOME | DRG: 420 | End: 2022-04-26
Attending: EMERGENCY MEDICINE | Admitting: INTERNAL MEDICINE
Payer: COMMERCIAL

## 2022-04-24 DIAGNOSIS — E10.10 DIABETIC KETOACIDOSIS WITHOUT COMA ASSOCIATED WITH TYPE 1 DIABETES MELLITUS (HCC): Primary | ICD-10-CM

## 2022-04-24 LAB
ALBUMIN SERPL-MCNC: 4.2 G/DL (ref 3.5–5.2)
ALP BLD-CCNC: 108 U/L (ref 35–104)
ALT SERPL-CCNC: 14 U/L (ref 0–32)
ANION GAP SERPL CALCULATED.3IONS-SCNC: 25 MMOL/L (ref 7–16)
AST SERPL-CCNC: 13 U/L (ref 0–31)
BACTERIA: NORMAL /HPF
BASOPHILS ABSOLUTE: 0.04 E9/L (ref 0–0.2)
BASOPHILS RELATIVE PERCENT: 0.3 % (ref 0–2)
BETA-HYDROXYBUTYRATE: >4.5 MMOL/L (ref 0.02–0.27)
BILIRUB SERPL-MCNC: 0.3 MG/DL (ref 0–1.2)
BILIRUBIN URINE: NEGATIVE
BLOOD, URINE: ABNORMAL
BUN BLDV-MCNC: 14 MG/DL (ref 6–20)
CALCIUM SERPL-MCNC: 8.8 MG/DL (ref 8.6–10.2)
CHLORIDE BLD-SCNC: 97 MMOL/L (ref 98–107)
CHP ED QC CHECK: NORMAL
CHP ED QC CHECK: NORMAL
CLARITY: CLEAR
CO2: 8 MMOL/L (ref 22–29)
COLOR: YELLOW
CREAT SERPL-MCNC: 0.7 MG/DL (ref 0.5–1)
EOSINOPHILS ABSOLUTE: 0.02 E9/L (ref 0.05–0.5)
EOSINOPHILS RELATIVE PERCENT: 0.2 % (ref 0–6)
GFR AFRICAN AMERICAN: >60
GFR NON-AFRICAN AMERICAN: >60 ML/MIN/1.73
GLUCOSE BLD-MCNC: 343 MG/DL
GLUCOSE BLD-MCNC: 421 MG/DL (ref 74–99)
GLUCOSE BLD-MCNC: 460 MG/DL
GLUCOSE URINE: >=1000 MG/DL
HCG, URINE, POC: NEGATIVE
HCT VFR BLD CALC: 39.5 % (ref 34–48)
HEMOGLOBIN: 12.9 G/DL (ref 11.5–15.5)
IMMATURE GRANULOCYTES #: 0.06 E9/L
IMMATURE GRANULOCYTES %: 0.5 % (ref 0–5)
KETONES, URINE: >=80 MG/DL
LACTIC ACID: 1.8 MMOL/L (ref 0.5–2.2)
LEUKOCYTE ESTERASE, URINE: NEGATIVE
LIPASE: 25 U/L (ref 13–60)
LYMPHOCYTES ABSOLUTE: 3.02 E9/L (ref 1.5–4)
LYMPHOCYTES RELATIVE PERCENT: 26.4 % (ref 20–42)
Lab: NORMAL
MCH RBC QN AUTO: 27.7 PG (ref 26–35)
MCHC RBC AUTO-ENTMCNC: 32.7 % (ref 32–34.5)
MCV RBC AUTO: 84.9 FL (ref 80–99.9)
METER GLUCOSE: 208 MG/DL (ref 74–99)
METER GLUCOSE: 237 MG/DL (ref 74–99)
METER GLUCOSE: 250 MG/DL (ref 74–99)
METER GLUCOSE: 343 MG/DL (ref 74–99)
METER GLUCOSE: 460 MG/DL (ref 74–99)
MONOCYTES ABSOLUTE: 0.4 E9/L (ref 0.1–0.95)
MONOCYTES RELATIVE PERCENT: 3.5 % (ref 2–12)
NEGATIVE QC PASS/FAIL: NORMAL
NEUTROPHILS ABSOLUTE: 7.92 E9/L (ref 1.8–7.3)
NEUTROPHILS RELATIVE PERCENT: 69.1 % (ref 43–80)
NITRITE, URINE: NEGATIVE
PDW BLD-RTO: 12.6 FL (ref 11.5–15)
PH UA: 6 (ref 5–9)
PH VENOUS: 7.09 (ref 7.35–7.45)
PLATELET # BLD: 300 E9/L (ref 130–450)
PMV BLD AUTO: 11.4 FL (ref 7–12)
POSITIVE QC PASS/FAIL: NORMAL
POTASSIUM SERPL-SCNC: 4.9 MMOL/L (ref 3.5–5)
PROTEIN UA: NEGATIVE MG/DL
RBC # BLD: 4.65 E12/L (ref 3.5–5.5)
RBC UA: NORMAL /HPF (ref 0–2)
SODIUM BLD-SCNC: 130 MMOL/L (ref 132–146)
SPECIFIC GRAVITY UA: 1.01 (ref 1–1.03)
TOTAL PROTEIN: 8.1 G/DL (ref 6.4–8.3)
UROBILINOGEN, URINE: 0.2 E.U./DL
WBC # BLD: 11.5 E9/L (ref 4.5–11.5)
WBC UA: NORMAL /HPF (ref 0–5)

## 2022-04-24 PROCEDURE — 81001 URINALYSIS AUTO W/SCOPE: CPT

## 2022-04-24 PROCEDURE — 6370000000 HC RX 637 (ALT 250 FOR IP): Performed by: EMERGENCY MEDICINE

## 2022-04-24 PROCEDURE — 2580000003 HC RX 258: Performed by: EMERGENCY MEDICINE

## 2022-04-24 PROCEDURE — 82962 GLUCOSE BLOOD TEST: CPT

## 2022-04-24 PROCEDURE — 99223 1ST HOSP IP/OBS HIGH 75: CPT | Performed by: INTERNAL MEDICINE

## 2022-04-24 PROCEDURE — 6360000002 HC RX W HCPCS: Performed by: INTERNAL MEDICINE

## 2022-04-24 PROCEDURE — 96374 THER/PROPH/DIAG INJ IV PUSH: CPT

## 2022-04-24 PROCEDURE — 83605 ASSAY OF LACTIC ACID: CPT

## 2022-04-24 PROCEDURE — 82800 BLOOD PH: CPT

## 2022-04-24 PROCEDURE — 99285 EMERGENCY DEPT VISIT HI MDM: CPT

## 2022-04-24 PROCEDURE — 80053 COMPREHEN METABOLIC PANEL: CPT

## 2022-04-24 PROCEDURE — 85025 COMPLETE CBC W/AUTO DIFF WBC: CPT

## 2022-04-24 PROCEDURE — 6360000002 HC RX W HCPCS: Performed by: EMERGENCY MEDICINE

## 2022-04-24 PROCEDURE — 2580000003 HC RX 258: Performed by: INTERNAL MEDICINE

## 2022-04-24 PROCEDURE — 82010 KETONE BODYS QUAN: CPT

## 2022-04-24 PROCEDURE — 2000000000 HC ICU R&B

## 2022-04-24 PROCEDURE — 83690 ASSAY OF LIPASE: CPT

## 2022-04-24 PROCEDURE — 96361 HYDRATE IV INFUSION ADD-ON: CPT

## 2022-04-24 PROCEDURE — 87081 CULTURE SCREEN ONLY: CPT

## 2022-04-24 RX ORDER — 0.9 % SODIUM CHLORIDE 0.9 %
1000 INTRAVENOUS SOLUTION INTRAVENOUS ONCE
Status: COMPLETED | OUTPATIENT
Start: 2022-04-24 | End: 2022-04-24

## 2022-04-24 RX ORDER — ENOXAPARIN SODIUM 100 MG/ML
30 INJECTION SUBCUTANEOUS DAILY
Status: DISCONTINUED | OUTPATIENT
Start: 2022-04-24 | End: 2022-04-24

## 2022-04-24 RX ORDER — MAGNESIUM SULFATE 1 G/100ML
1000 INJECTION INTRAVENOUS PRN
Status: DISCONTINUED | OUTPATIENT
Start: 2022-04-24 | End: 2022-04-25

## 2022-04-24 RX ORDER — POTASSIUM CHLORIDE 7.45 MG/ML
10 INJECTION INTRAVENOUS PRN
Status: DISCONTINUED | OUTPATIENT
Start: 2022-04-24 | End: 2022-04-25

## 2022-04-24 RX ORDER — ENOXAPARIN SODIUM 100 MG/ML
30 INJECTION SUBCUTANEOUS DAILY
Status: DISCONTINUED | OUTPATIENT
Start: 2022-04-24 | End: 2022-04-26 | Stop reason: HOSPADM

## 2022-04-24 RX ORDER — DEXTROSE AND SODIUM CHLORIDE 5; .45 G/100ML; G/100ML
INJECTION, SOLUTION INTRAVENOUS CONTINUOUS PRN
Status: DISCONTINUED | OUTPATIENT
Start: 2022-04-24 | End: 2022-04-25

## 2022-04-24 RX ORDER — POTASSIUM CHLORIDE 7.45 MG/ML
10 INJECTION INTRAVENOUS PRN
Status: DISCONTINUED | OUTPATIENT
Start: 2022-04-24 | End: 2022-04-24 | Stop reason: SDUPTHER

## 2022-04-24 RX ORDER — MAGNESIUM SULFATE 1 G/100ML
1000 INJECTION INTRAVENOUS PRN
Status: DISCONTINUED | OUTPATIENT
Start: 2022-04-24 | End: 2022-04-24 | Stop reason: SDUPTHER

## 2022-04-24 RX ORDER — DEXTROSE AND SODIUM CHLORIDE 5; .45 G/100ML; G/100ML
INJECTION, SOLUTION INTRAVENOUS CONTINUOUS PRN
Status: DISCONTINUED | OUTPATIENT
Start: 2022-04-24 | End: 2022-04-24 | Stop reason: SDUPTHER

## 2022-04-24 RX ORDER — DEXTROSE MONOHYDRATE 25 G/50ML
12.5 INJECTION, SOLUTION INTRAVENOUS PRN
Status: DISCONTINUED | OUTPATIENT
Start: 2022-04-24 | End: 2022-04-24 | Stop reason: SDUPTHER

## 2022-04-24 RX ORDER — SODIUM CHLORIDE 450 MG/100ML
INJECTION, SOLUTION INTRAVENOUS CONTINUOUS
Status: DISCONTINUED | OUTPATIENT
Start: 2022-04-24 | End: 2022-04-24 | Stop reason: ALTCHOICE

## 2022-04-24 RX ORDER — POLYETHYLENE GLYCOL 3350 17 G/17G
17 POWDER, FOR SOLUTION ORAL DAILY PRN
Status: DISCONTINUED | OUTPATIENT
Start: 2022-04-24 | End: 2022-04-26 | Stop reason: HOSPADM

## 2022-04-24 RX ORDER — MORPHINE SULFATE 2 MG/ML
1 INJECTION, SOLUTION INTRAMUSCULAR; INTRAVENOUS EVERY 4 HOURS PRN
Status: DISCONTINUED | OUTPATIENT
Start: 2022-04-24 | End: 2022-04-25

## 2022-04-24 RX ORDER — SODIUM CHLORIDE 9 MG/ML
INJECTION, SOLUTION INTRAVENOUS CONTINUOUS
Status: DISCONTINUED | OUTPATIENT
Start: 2022-04-24 | End: 2022-04-25

## 2022-04-24 RX ORDER — DEXTROSE MONOHYDRATE 25 G/50ML
12.5 INJECTION, SOLUTION INTRAVENOUS PRN
Status: DISCONTINUED | OUTPATIENT
Start: 2022-04-24 | End: 2022-04-24 | Stop reason: CLARIF

## 2022-04-24 RX ORDER — ONDANSETRON 2 MG/ML
4 INJECTION INTRAMUSCULAR; INTRAVENOUS ONCE
Status: COMPLETED | OUTPATIENT
Start: 2022-04-24 | End: 2022-04-24

## 2022-04-24 RX ORDER — ENOXAPARIN SODIUM 100 MG/ML
40 INJECTION SUBCUTANEOUS DAILY
Status: DISCONTINUED | OUTPATIENT
Start: 2022-04-24 | End: 2022-04-24 | Stop reason: DRUGHIGH

## 2022-04-24 RX ORDER — MORPHINE SULFATE 2 MG/ML
2 INJECTION, SOLUTION INTRAMUSCULAR; INTRAVENOUS ONCE
Status: COMPLETED | OUTPATIENT
Start: 2022-04-24 | End: 2022-04-24

## 2022-04-24 RX ADMIN — SODIUM CHLORIDE: 9 INJECTION, SOLUTION INTRAVENOUS at 20:24

## 2022-04-24 RX ADMIN — MORPHINE SULFATE 2 MG: 2 INJECTION, SOLUTION INTRAMUSCULAR; INTRAVENOUS at 20:21

## 2022-04-24 RX ADMIN — SODIUM CHLORIDE 1000 ML: 9 INJECTION, SOLUTION INTRAVENOUS at 19:13

## 2022-04-24 RX ADMIN — ENOXAPARIN SODIUM 30 MG: 100 INJECTION SUBCUTANEOUS at 22:09

## 2022-04-24 RX ADMIN — SODIUM CHLORIDE 1000 ML: 9 INJECTION, SOLUTION INTRAVENOUS at 16:44

## 2022-04-24 RX ADMIN — POTASSIUM CHLORIDE 10 MEQ: 7.46 INJECTION, SOLUTION INTRAVENOUS at 22:09

## 2022-04-24 RX ADMIN — SODIUM CHLORIDE 0.1 UNITS/KG/HR: 9 INJECTION, SOLUTION INTRAVENOUS at 20:31

## 2022-04-24 RX ADMIN — SODIUM CHLORIDE 4.99 UNITS/HR: 9 INJECTION, SOLUTION INTRAVENOUS at 21:48

## 2022-04-24 RX ADMIN — POTASSIUM CHLORIDE 10 MEQ: 7.46 INJECTION, SOLUTION INTRAVENOUS at 20:27

## 2022-04-24 RX ADMIN — MORPHINE SULFATE 1 MG: 2 INJECTION, SOLUTION INTRAMUSCULAR; INTRAVENOUS at 22:12

## 2022-04-24 RX ADMIN — ONDANSETRON 4 MG: 2 INJECTION INTRAMUSCULAR; INTRAVENOUS at 16:46

## 2022-04-24 RX ADMIN — DEXTROSE AND SODIUM CHLORIDE: 5; 450 INJECTION, SOLUTION INTRAVENOUS at 21:50

## 2022-04-24 ASSESSMENT — PAIN SCALES - GENERAL
PAINLEVEL_OUTOF10: 10
PAINLEVEL_OUTOF10: 0

## 2022-04-24 ASSESSMENT — PAIN DESCRIPTION - ORIENTATION: ORIENTATION: MID;LOWER

## 2022-04-24 ASSESSMENT — PAIN DESCRIPTION - LOCATION: LOCATION: BACK

## 2022-04-24 ASSESSMENT — PAIN - FUNCTIONAL ASSESSMENT: PAIN_FUNCTIONAL_ASSESSMENT: 0-10

## 2022-04-24 ASSESSMENT — PAIN DESCRIPTION - DESCRIPTORS: DESCRIPTORS: SHARP;THROBBING

## 2022-04-24 NOTE — ED PROVIDER NOTES
HPI:  4/24/22,   Time: 3:53 PM EDT       Senia Michele is a 27 y.o. female presenting to the ED for nausea vomiting and cramping abdominal pain, beginning day ago. The complaint has been persistent, moderate in severity, and worsened by nothing. Is a 79-year-old female history of insulin-dependent diabetes. She has had frequent admissions for DKA. She states she \"Feels like she is in DKA today\". She states began vomiting last night. She is vomited 3 times. She has not seen blood in her vomit. Not have any diarrhea she states she just has diffuse abdominal cramping and some lower back achiness bilaterally. She states \"I always get this way when I have DKA\". No fevers or chills no dysuria urgency or frequency. No vaginal bleeding or vaginal discharge. No diarrhea. No chest pain or shortness of breath. No fevers or chills. Review of Systems:   Pertinent positives and negatives are stated within HPI, all other systems reviewed and are negative.          --------------------------------------------- PAST HISTORY ---------------------------------------------  Past Medical History:  has a past medical history of Bleeding at insertion site, Common femoral artery injury, right, initial encounter, Depressive disorder, Diabetic ketoacidosis ([facility] [address] DM type 1 (diabetes mellitus, type 1) ([facility] [address] Marijuana abuse, Non-compliance, and Trichimoniasis. Past Surgical History:  has a past surgical history that includes Abdomen surgery (N/A, 5/9/2019) and Bartholin gland cyst excision. Social History:  reports that she has never smoked. She has never used smokeless tobacco. She reports previous drug use. Drug: Marijuana Nena Awkward). She reports that she does not drink alcohol. Family History: family history includes Diabetes in her maternal grandmother and paternal grandmother; Stroke in an other family member. The patients home medications have been reviewed.     Allergies: Patient has no known allergies. ---------------------------------------------------PHYSICAL EXAM--------------------------------------    Constitutional/General: Alert and oriented x3,  NAD; thin  Head: Normocephalic and atraumatic  Eyes: PERRL, EOMI, conjunctive normal, sclera non icteric  Mouth: Oropharynx clear, handling secretions, no trismus, no asymmetry of the posterior oropharynx or uvular edema  Neck: Supple, full ROM, non tender to palpation in the midline, no stridor, no crepitus, no meningeal signs  Respiratory: Lungs clear to auscultation bilaterally, no wheezes, rales, or rhonchi. Not in respiratory distress  Cardiovascular:  Regular rate. Regular rhythm. No murmurs, gallops, or rubs. 2+ distal pulses  Chest: No chest wall tenderness  GI:  Abdomen Soft, minimal diffuse tenderness. No Marrufo sign. No McBurney's point tenderness no CVA tenderness bilaterally. , Non distended. +BS. No organomegaly, no palpable masses,  No rebound, guarding, or rigidity. Musculoskeletal: Moves all extremities x 4. Warm and well perfused, no clubbing, cyanosis, or edema. Capillary refill <3 seconds  Integument: skin warm and dry. No rashes. Lymphatic: no lymphadenopathy noted  Neurologic: GCS 15, no focal deficits, symmetric strength 5/5 in the upper and lower extremities bilaterally  Psychiatric: Normal Affect    -------------------------------------------------- RESULTS -------------------------------------------------  I have personally reviewed all laboratory and imaging results for this patient. Results are listed below.      LABS:  Results for orders placed or performed during the hospital encounter of 04/24/22   CBC with Auto Differential   Result Value Ref Range    WBC 11.5 4.5 - 11.5 E9/L    RBC 4.65 3.50 - 5.50 E12/L    Hemoglobin 12.9 11.5 - 15.5 g/dL    Hematocrit 39.5 34.0 - 48.0 %    MCV 84.9 80.0 - 99.9 fL    MCH 27.7 26.0 - 35.0 pg    MCHC 32.7 32.0 - 34.5 %    RDW 12.6 11.5 - 15.0 fL    Platelets 101 213 - 668 E9/L MPV 11.4 7.0 - 12.0 fL    Neutrophils % 69.1 43.0 - 80.0 %    Immature Granulocytes % 0.5 0.0 - 5.0 %    Lymphocytes % 26.4 20.0 - 42.0 %    Monocytes % 3.5 2.0 - 12.0 %    Eosinophils % 0.2 0.0 - 6.0 %    Basophils % 0.3 0.0 - 2.0 %    Neutrophils Absolute 7.92 (H) 1.80 - 7.30 E9/L    Immature Granulocytes # 0.06 E9/L    Lymphocytes Absolute 3.02 1.50 - 4.00 E9/L    Monocytes Absolute 0.40 0.10 - 0.95 E9/L    Eosinophils Absolute 0.02 (L) 0.05 - 0.50 E9/L    Basophils Absolute 0.04 0.00 - 0.20 E9/L   Comprehensive Metabolic Panel   Result Value Ref Range    Sodium 130 (L) 132 - 146 mmol/L    Potassium 4.9 3.5 - 5.0 mmol/L    Chloride 97 (L) 98 - 107 mmol/L    CO2 8 (LL) 22 - 29 mmol/L    Anion Gap 25 (H) 7 - 16 mmol/L    Glucose 421 (H) 74 - 99 mg/dL    BUN 14 6 - 20 mg/dL    CREATININE 0.7 0.5 - 1.0 mg/dL    GFR Non-African American >60 >=60 mL/min/1.73    GFR African American >60     Calcium 8.8 8.6 - 10.2 mg/dL    Total Protein 8.1 6.4 - 8.3 g/dL    Albumin 4.2 3.5 - 5.2 g/dL    Total Bilirubin 0.3 0.0 - 1.2 mg/dL    Alkaline Phosphatase 108 (H) 35 - 104 U/L    ALT 14 0 - 32 U/L    AST 13 0 - 31 U/L   Lipase   Result Value Ref Range    Lipase 25 13 - 60 U/L   pH, venous   Result Value Ref Range    pH, Christoph 7.09 (LL) 7.35 - 7.45   Beta-Hydroxybutyrate   Result Value Ref Range    Beta-Hydroxybutyrate >4.50 (H) 0.02 - 0.27 mmol/L   Urinalysis   Result Value Ref Range    Color, UA Yellow Straw/Yellow    Clarity, UA Clear Clear    Glucose, Ur >=1000 (A) Negative mg/dL    Bilirubin Urine Negative Negative    Ketones, Urine >=80 (A) Negative mg/dL    Specific Gravity, UA 1.015 1.005 - 1.030    Blood, Urine LARGE (A) Negative    pH, UA 6.0 5.0 - 9.0    Protein, UA Negative Negative mg/dL    Urobilinogen, Urine 0.2 <2.0 E.U./dL    Nitrite, Urine Negative Negative    Leukocyte Esterase, Urine Negative Negative   Lactic Acid   Result Value Ref Range    Lactic Acid 1.8 0.5 - 2.2 mmol/L   Microscopic Urinalysis   Result Value Ref Range    WBC, UA NONE 0 - 5 /HPF    RBC, UA 2-5 0 - 2 /HPF    Bacteria, UA NONE SEEN None Seen /HPF   POCT Glucose   Result Value Ref Range    Glucose 460 mg/dL    QC OK? ok    POC Pregnancy Urine Qual   Result Value Ref Range    HCG, Urine, POC Negative Negative    Lot Number [id number]     Positive QC Pass/Fail Pass     Negative QC Pass/Fail Pass    POCT Glucose   Result Value Ref Range    Meter Glucose 460 (H) 74 - 99 mg/dL   POCT Glucose - every hour   Result Value Ref Range    Glucose 343 mg/dL    QC OK? ok    POCT Glucose   Result Value Ref Range    Meter Glucose 343 (H) 74 - 99 mg/dL       RADIOLOGY:  Interpreted by Radiologist.  No orders to display         ------------------------- NURSING NOTES AND VITALS REVIEWED ---------------------------   The nursing notes within the ED encounter and vital signs as below have been reviewed by myself. /80   Pulse 108   Temp 97.2 °F (36.2 °C) (Oral)   Resp 15   Ht 5' 1\" (1.549 m)   Wt 110 lb (49.9 kg)   SpO2 97%   BMI 20.78 kg/m²   Oxygen Saturation Interpretation: Normal    The patients available past medical records and past encounters were reviewed.         ------------------------------ ED COURSE/MEDICAL DECISION MAKING----------------------  Medications   potassium chloride 10 mEq/100 mL IVPB (Peripheral Line) (10 mEq IntraVENous Not Given 4/24/22 1954)   sodium phosphate 10 mmol in sodium chloride 0.9 % 250 mL IVPB (has no administration in time range)     Or   sodium phosphate 15 mmol in dextrose 5 % 250 mL IVPB (has no administration in time range)     Or   sodium phosphate 20 mmol in dextrose 5 % 500 mL IVPB (has no administration in time range)   insulin regular (HUMULIN R;NOVOLIN R) 100 Units in sodium chloride 0.9 % 100 mL infusion (has no administration in time range)   dextrose 50 % IV solution (has no administration in time range)   potassium chloride 10 mEq/100 mL IVPB (Peripheral Line) (has no administration in time range) magnesium sulfate 1000 mg in dextrose 5% 100 mL IVPB (has no administration in time range)   sodium phosphate 10 mmol in sodium chloride 0.9 % 250 mL IVPB (has no administration in time range)     Or   sodium phosphate 15 mmol in dextrose 5 % 250 mL IVPB (has no administration in time range)     Or   sodium phosphate 20 mmol in dextrose 5 % 500 mL IVPB (has no administration in time range)   polyethylene glycol (GLYCOLAX) packet 17 g (has no administration in time range)   0.9 % sodium chloride infusion (has no administration in time range)   dextrose 5 % and 0.45 % sodium chloride infusion (has no administration in time range)   enoxaparin (LOVENOX) injection 40 mg (has no administration in time range)   morphine (PF) injection 2 mg (has no administration in time range)   0.9 % sodium chloride bolus (0 mLs IntraVENous Stopped 4/24/22 1913)   ondansetron (ZOFRAN) injection 4 mg (4 mg IntraVENous Given 4/24/22 1646)   0.9 % sodium chloride bolus (1,000 mLs IntraVENous New Bag 4/24/22 1913)         ED COURSE:  ED Course as of 04/24/22 2001   Sun Apr 24, 2022 1944 Spoke to Dr. Melisa Malone from the ICU. He accepted patient to the ICU on an insulin drip. [id number] to the hospitalist, Dr. Danna Singh who accepted the patient to their service. Patient will be admitted to the ICU. [KK]      ED Course User Index  [KK] Chuck Cai MD       Medical Decision Making:   Patient is a pleasant 74-year-old female insulin-dependent diabetic comes in from home. History of noncompliance frequent DKA admissions. Last time was in January of this year. She reports diffuse cramping to the low back and abdomen and intermittent nausea and vomiting since last night. No fevers or chills. No focal pain on abdominal exam.  She states she feels similar to when she is been in DKA in the past to be given IV fluid she is mildly hypotensive slightly tachycardic here. We will check lab work to rule out DKA.   Will give Zofran as well.    Differential diagnosis nausea vomiting EMILY DKA hyperglycemia dehydration pregnancy UTI pyelonephritis      This patient has remained hemodynamically stable and been closely monitored during their ED course. CBC was normal white count was 11. 5. Lactic acid was 1.8. Urine pregnancy was negative urinalysis was negative for acute infection. Lipase was normal venous pH was 7.09. Beta hydroxybutyrate greater than 4. 5. Patient clearly in DKA. Chemistry shows glucose of 421 anion gap of 25 CO2 of 8 potassium 4.9. Corrected sodium was 135. Patient been given 2 L of IV fluids. She was started on insulin drip. I spoke to the intensivist, Dr. Lianna De La Rosa. He accepted the patient to the ICU. I spoke to the hospitalist agreed to meet the patient her service. Re-Evaluations:             Re-evaluation. Patients symptoms are improving    Re-examination  4/24/22   3:53 PM EDT          Vital Signs:   Vitals:    04/24/22 1526 04/24/22 1646 04/24/22 1701 04/24/22 1917   BP: (!) 85/54 123/81  121/80   Pulse: 110 108 110 108   Resp: 16 15  15   Temp: 97 °F (36.1 °C)   97.2 °F (36.2 °C)   TempSrc: Oral   Oral   SpO2: 98% 98%  97%   Weight: 110 lb (49.9 kg)      Height: 5' 1\" (1.549 m)            Counseling: The emergency provider has spoken with the patient and discussed todays results, in addition to providing specific details for the plan of care and counseling regarding the diagnosis and prognosis. Questions are answered at this time and they are agreeable with the plan. CRITICAL CARE:   44 MINUTES. Please note that the withdrawal or failure to initiate urgent interventions for this patient would likely result in a life threatening deterioration or permanent disability. Accordingly this patient received the above mentioned time, excluding separately billable procedures. --------------------------------- IMPRESSION AND DISPOSITION ---------------------------------    IMPRESSION  1.  Diabetic ketoacidosis without coma associated with type 1 diabetes mellitus ([facility] [address]        DISPOSITION  Disposition: Admit to CCU/ICU  Patient condition is serious    NOTE: This report was transcribed using voice recognition software.  Every effort was made to ensure accuracy; however, inadvertent computerized transcription errors may be present        Antione Marrero MD  04/24/22 6985

## 2022-04-24 NOTE — H&P
[facility] History and Physical      CHIEF COMPLAINT:      History of Present Illness:      44-year-old female with diabetes mellitus type 1 and apparently frequent admissions with DKA presents with nausea, vomiting, abdominal pain. Patient normally is on Lantus 22 units in the a.m. as well as lispro sliding scale. Patient states that she started her menstrual cycle a day ago and did not take her diabetic regimen as she was not eating. She thinks that this may have triggered her DKA. Otherwise she denies any infectious complaints. Patient states that she is normally otherwise compliant. She denies following up with endocrinologist.    In the ER she received normal saline 2 L bolus x2, morphine 2 mg IV x1, and Zofran 4 mg IV x1    Informant(s) for H&P: Patient, EMR    REVIEW OF SYSTEMS:  A comprehensive review of systems was negative except for: what is in the HPI      PMH:  Past Medical History:   Diagnosis Date    Bleeding at insertion site 01/05/2018    Common femoral artery injury, right, initial encounter 01/05/2018    Depressive disorder     Diabetic k[facility]acidosis ([facility] [address]     DM type 1 (diabetes mellitus, type 1) ([facility] [address]     Marijuana abuse     Non-compliance     Trichimoniasis 11/19/2021       Surgical History:  Past Surgical History:   Procedure Laterality Date    ABDOMEN SURGERY N/A 5/9/2019    INCISION AND DRAINAGE OF SUPRAPUBIC ABSESS performed by Salvatore Bearden MD at [address] [place] [address]      last yr       Medications Prior to Admission:    Prior to Admission medications    Medication Sig Start Date End Date Taking?  Authorizing Provider   docusate sodium (COLACE) 100 MG capsule Take 1 capsule by mouth 2 times daily 4/3/22 5/3/22  Basilio Beard,    polyethylene glycol (GLYCOLAX) 17 GM/SCOOP powder Take 17 g by mouth daily as needed (constipation) 4/3/22 5/3/22  Basilio Beard DO   naproxen (NAPROSYN) 500 MG tablet Take 1 tablet by mouth 2 times daily as needed for Pain 3/29/22   Ingrid Singh MD   melatonin (RA MELATONIN) 3 MG TABS tablet Take 2 tablets by mouth daily 1/11/22   Enma Graves DO   insulin glargine (LANTUS SOLOSTAR) 100 UNIT/ML injection pen Inject 22 Units into the skin every morning 1/9/22   Bertram Beavers MD   Insulin Pen Needle (BD PEN NEEDLE SHELBIE U/F) 32G X 4 MM MISC Uses with insulin 4 times a day 1/9/22   Bertram Beavers MD   insulin lispro, 1 Unit Dial, (HUMALOG KWIKPEN) 100 UNIT/ML SOPN Inject 1 units per 10 grams of carbs + following sliding scale. -200 add 2U, -250 add 4U, -300 add 6U, -350 add 8U, -400 add 12U, BS over 400 add 12U 1/9/22   Bertram Beavers MD   acetaminophen (TYLENOL) 500 MG tablet Take 1 tablet by mouth every 4 hours as needed for Pain or Fever 12/22/21 1/1/22  Kenna Nation DO       Allergies:    Patient has no known allergies. Social History:    reports that she has never smoked. She has never used smokeless tobacco. She reports previous drug use. Drug: Marijuana Emmie Rodriguez). She reports that she does not drink alcohol. Family History:   family history includes Diabetes in her maternal grandmother and paternal grandmother; Stroke in an other family member.        PHYSICAL EXAM:  Vitals:  /80   Pulse 108   Temp 97.2 °F (36.2 °C) (Oral)   Resp 15   Ht 5' 1\" (1.549 m)   Wt 110 lb (49.9 kg)   SpO2 97%   BMI 20.78 kg/m²     General Appearance: alert and oriented to person, place and time and in no acute distress  Skin: warm and dry  Head: normocephalic and atraumatic  Eyes: pupils equal, round, and reactive to light, extraocular eye movements intact, conjunctivae normal  Neck: neck supple and non tender without mass   Pulmonary/Chest: clear to auscultation bilaterally- no wheezes, rales or rhonchi, normal air movement, no respiratory distress  Cardiovascular: normal rate, normal S1 and S2 and no carotid bruits  Abdomen: soft, non-tender, non-distended, normal bowel sounds, no masses or organomegaly  Extremities: no cyanosis, no clubbing and no edema  Neurologic: no cranial nerve deficit and speech normal        LABS:  Recent Labs     04/24/22  1603 04/24/22  1632 04/24/22  1953   *  --   --    K 4.9  --   --    CL 97*  --   --    CO2 8*  --   --    BUN 14  --   --    CREATININE 0.7  --   --    GLUCOSE 421* 460 343   CALCIUM 8.8  --   --        Recent Labs     04/24/22  1603   WBC 11.5   RBC 4.65   HGB 12.9   HCT 39.5   MCV 84.9   MCH 27.7   MCHC 32.7   RDW 12.6      MPV 11.4       No results for input(s): POCGLU in the last 72 hours. Radiology:   No orders to display       ASSESSMENT:      Principal Problem:    DKA, type 1, not at goal [facility])  Resolved Problems:    * No resolved hospital problems. *      PLAN:    1. DKA -current anion gap is 25. Patient received 2 L NS bolus in the ER. We will keep patient on normal saline and then switch over to D5 one half NS once blood sugars less than 25. We will continue to monitor BMP every 4 hours to keep a track of the anion gap. Patient has been started on insulin drip and will continue. Intensivist as well as endocrinologist has been consulted. We will keep patient n.p.o. until able to tolerate oral intake. 2.  Diabetes mellitus type 1 -hold Lantus 22 units every morning as well as lispro sliding scale. Last hemoglobin A1c from January 8, 2022 was 16.8. Will repeat hemoglobin A1c  3. DVT prophylaxis -Lovenox    NOTE: This report was transcribed using voice recognition software. Every effort was made to ensure accuracy; however, inadvertent computerized transcription errors may be present.   Electronically signed by aKtlyn Olivarez DO on 4/24/2022 at 7:57 PM

## 2022-04-25 LAB
ANION GAP SERPL CALCULATED.3IONS-SCNC: 10 MMOL/L (ref 7–16)
ANION GAP SERPL CALCULATED.3IONS-SCNC: 15 MMOL/L (ref 7–16)
BASOPHILS ABSOLUTE: 0.05 E9/L (ref 0–0.2)
BASOPHILS RELATIVE PERCENT: 0.5 % (ref 0–2)
BUN BLDV-MCNC: 10 MG/DL (ref 6–20)
BUN BLDV-MCNC: 8 MG/DL (ref 6–20)
CALCIUM SERPL-MCNC: 7.9 MG/DL (ref 8.6–10.2)
CALCIUM SERPL-MCNC: 8.3 MG/DL (ref 8.6–10.2)
CHLORIDE BLD-SCNC: 106 MMOL/L (ref 98–107)
CHLORIDE BLD-SCNC: 110 MMOL/L (ref 98–107)
CO2: 11 MMOL/L (ref 22–29)
CO2: 14 MMOL/L (ref 22–29)
CREAT SERPL-MCNC: 0.6 MG/DL (ref 0.5–1)
CREAT SERPL-MCNC: 0.6 MG/DL (ref 0.5–1)
EOSINOPHILS ABSOLUTE: 0.11 E9/L (ref 0.05–0.5)
EOSINOPHILS RELATIVE PERCENT: 1.1 % (ref 0–6)
GFR AFRICAN AMERICAN: >60
GFR AFRICAN AMERICAN: >60
GFR NON-AFRICAN AMERICAN: >60 ML/MIN/1.73
GFR NON-AFRICAN AMERICAN: >60 ML/MIN/1.73
GLUCOSE BLD-MCNC: 114 MG/DL (ref 74–99)
GLUCOSE BLD-MCNC: 236 MG/DL (ref 74–99)
HBA1C MFR BLD: 15.6 % (ref 4–5.6)
HCT VFR BLD CALC: 34.1 % (ref 34–48)
HEMOGLOBIN: 11.7 G/DL (ref 11.5–15.5)
IMMATURE GRANULOCYTES #: 0.03 E9/L
IMMATURE GRANULOCYTES %: 0.3 % (ref 0–5)
LYMPHOCYTES ABSOLUTE: 3.12 E9/L (ref 1.5–4)
LYMPHOCYTES RELATIVE PERCENT: 31.5 % (ref 20–42)
MAGNESIUM: 1.9 MG/DL (ref 1.6–2.6)
MAGNESIUM: 1.9 MG/DL (ref 1.6–2.6)
MCH RBC QN AUTO: 28.1 PG (ref 26–35)
MCHC RBC AUTO-ENTMCNC: 34.3 % (ref 32–34.5)
MCV RBC AUTO: 81.8 FL (ref 80–99.9)
METER GLUCOSE: 100 MG/DL (ref 74–99)
METER GLUCOSE: 101 MG/DL (ref 74–99)
METER GLUCOSE: 103 MG/DL (ref 74–99)
METER GLUCOSE: 109 MG/DL (ref 74–99)
METER GLUCOSE: 129 MG/DL (ref 74–99)
METER GLUCOSE: 134 MG/DL (ref 74–99)
METER GLUCOSE: 149 MG/DL (ref 74–99)
METER GLUCOSE: 163 MG/DL (ref 74–99)
METER GLUCOSE: 178 MG/DL (ref 74–99)
METER GLUCOSE: 207 MG/DL (ref 74–99)
METER GLUCOSE: 211 MG/DL (ref 74–99)
METER GLUCOSE: 291 MG/DL (ref 74–99)
MONOCYTES ABSOLUTE: 0.77 E9/L (ref 0.1–0.95)
MONOCYTES RELATIVE PERCENT: 7.8 % (ref 2–12)
NEUTROPHILS ABSOLUTE: 5.83 E9/L (ref 1.8–7.3)
NEUTROPHILS RELATIVE PERCENT: 58.8 % (ref 43–80)
PDW BLD-RTO: 12.8 FL (ref 11.5–15)
PHOSPHORUS: 2.3 MG/DL (ref 2.5–4.5)
PHOSPHORUS: 3.1 MG/DL (ref 2.5–4.5)
PLATELET # BLD: 290 E9/L (ref 130–450)
PMV BLD AUTO: 11 FL (ref 7–12)
POTASSIUM SERPL-SCNC: 4 MMOL/L (ref 3.5–5)
POTASSIUM SERPL-SCNC: 4.8 MMOL/L (ref 3.5–5)
RBC # BLD: 4.17 E12/L (ref 3.5–5.5)
SODIUM BLD-SCNC: 132 MMOL/L (ref 132–146)
SODIUM BLD-SCNC: 134 MMOL/L (ref 132–146)
WBC # BLD: 9.9 E9/L (ref 4.5–11.5)

## 2022-04-25 PROCEDURE — 6370000000 HC RX 637 (ALT 250 FOR IP): Performed by: INTERNAL MEDICINE

## 2022-04-25 PROCEDURE — 83036 HEMOGLOBIN GLYCOSYLATED A1C: CPT

## 2022-04-25 PROCEDURE — 6370000000 HC RX 637 (ALT 250 FOR IP)

## 2022-04-25 PROCEDURE — 2500000003 HC RX 250 WO HCPCS: Performed by: INTERNAL MEDICINE

## 2022-04-25 PROCEDURE — 2580000003 HC RX 258

## 2022-04-25 PROCEDURE — 2580000003 HC RX 258: Performed by: INTERNAL MEDICINE

## 2022-04-25 PROCEDURE — 85025 COMPLETE CBC W/AUTO DIFF WBC: CPT

## 2022-04-25 PROCEDURE — 83735 ASSAY OF MAGNESIUM: CPT

## 2022-04-25 PROCEDURE — 99222 1ST HOSP IP/OBS MODERATE 55: CPT | Performed by: INTERNAL MEDICINE

## 2022-04-25 PROCEDURE — 80048 BASIC METABOLIC PNL TOTAL CA: CPT

## 2022-04-25 PROCEDURE — 6360000002 HC RX W HCPCS: Performed by: INTERNAL MEDICINE

## 2022-04-25 PROCEDURE — 1200000000 HC SEMI PRIVATE

## 2022-04-25 PROCEDURE — 84100 ASSAY OF PHOSPHORUS: CPT

## 2022-04-25 PROCEDURE — 82962 GLUCOSE BLOOD TEST: CPT

## 2022-04-25 PROCEDURE — 36415 COLL VENOUS BLD VENIPUNCTURE: CPT

## 2022-04-25 RX ORDER — INSULIN GLARGINE 100 [IU]/ML
22 INJECTION, SOLUTION SUBCUTANEOUS DAILY
Status: DISCONTINUED | OUTPATIENT
Start: 2022-04-25 | End: 2022-04-26 | Stop reason: HOSPADM

## 2022-04-25 RX ORDER — ACETAMINOPHEN 325 MG/1
650 TABLET ORAL EVERY 4 HOURS PRN
Status: DISCONTINUED | OUTPATIENT
Start: 2022-04-25 | End: 2022-04-26 | Stop reason: HOSPADM

## 2022-04-25 RX ORDER — INSULIN LISPRO 100 [IU]/ML
0-6 INJECTION, SOLUTION INTRAVENOUS; SUBCUTANEOUS NIGHTLY
Status: DISCONTINUED | OUTPATIENT
Start: 2022-04-25 | End: 2022-04-26 | Stop reason: HOSPADM

## 2022-04-25 RX ORDER — NICOTINE POLACRILEX 4 MG
15 LOZENGE BUCCAL PRN
Status: DISCONTINUED | OUTPATIENT
Start: 2022-04-25 | End: 2022-04-25 | Stop reason: ALTCHOICE

## 2022-04-25 RX ORDER — INSULIN LISPRO 100 [IU]/ML
0-12 INJECTION, SOLUTION INTRAVENOUS; SUBCUTANEOUS
Status: DISCONTINUED | OUTPATIENT
Start: 2022-04-25 | End: 2022-04-26 | Stop reason: HOSPADM

## 2022-04-25 RX ORDER — SODIUM CHLORIDE 0.9 % (FLUSH) 0.9 %
SYRINGE (ML) INJECTION
Status: COMPLETED
Start: 2022-04-25 | End: 2022-04-25

## 2022-04-25 RX ADMIN — POTASSIUM CHLORIDE 10 MEQ: 7.46 INJECTION, SOLUTION INTRAVENOUS at 01:22

## 2022-04-25 RX ADMIN — SODIUM PHOSPHATE, MONOBASIC, MONOHYDRATE 10 MMOL: 276; 142 INJECTION, SOLUTION INTRAVENOUS at 02:39

## 2022-04-25 RX ADMIN — INSULIN LISPRO 2 UNITS: 100 INJECTION, SOLUTION INTRAVENOUS; SUBCUTANEOUS at 16:21

## 2022-04-25 RX ADMIN — POTASSIUM CHLORIDE 10 MEQ: 7.46 INJECTION, SOLUTION INTRAVENOUS at 09:11

## 2022-04-25 RX ADMIN — DEXTROSE AND SODIUM CHLORIDE: 5; 450 INJECTION, SOLUTION INTRAVENOUS at 06:09

## 2022-04-25 RX ADMIN — POTASSIUM CHLORIDE 10 MEQ: 7.46 INJECTION, SOLUTION INTRAVENOUS at 03:44

## 2022-04-25 RX ADMIN — POTASSIUM CHLORIDE 10 MEQ: 7.46 INJECTION, SOLUTION INTRAVENOUS at 02:36

## 2022-04-25 RX ADMIN — INSULIN LISPRO 3 UNITS: 100 INJECTION, SOLUTION INTRAVENOUS; SUBCUTANEOUS at 21:20

## 2022-04-25 RX ADMIN — INSULIN GLARGINE 22 UNITS: 100 INJECTION, SOLUTION SUBCUTANEOUS at 08:26

## 2022-04-25 RX ADMIN — SODIUM CHLORIDE, PRESERVATIVE FREE: 5 INJECTION INTRAVENOUS at 09:19

## 2022-04-25 RX ADMIN — MORPHINE SULFATE 1 MG: 2 INJECTION, SOLUTION INTRAMUSCULAR; INTRAVENOUS at 02:33

## 2022-04-25 RX ADMIN — ACETAMINOPHEN 650 MG: 325 TABLET ORAL at 09:13

## 2022-04-25 RX ADMIN — POTASSIUM CHLORIDE 10 MEQ: 7.46 INJECTION, SOLUTION INTRAVENOUS at 07:59

## 2022-04-25 RX ADMIN — INSULIN LISPRO 4 UNITS: 100 INJECTION, SOLUTION INTRAVENOUS; SUBCUTANEOUS at 11:24

## 2022-04-25 RX ADMIN — MORPHINE SULFATE 1 MG: 2 INJECTION, SOLUTION INTRAMUSCULAR; INTRAVENOUS at 06:59

## 2022-04-25 ASSESSMENT — PAIN DESCRIPTION - LOCATION
LOCATION: BACK

## 2022-04-25 ASSESSMENT — PAIN DESCRIPTION - ORIENTATION
ORIENTATION: MID;LOWER
ORIENTATION: LOWER
ORIENTATION: LOWER;MID

## 2022-04-25 ASSESSMENT — PAIN SCALES - GENERAL
PAINLEVEL_OUTOF10: 7
PAINLEVEL_OUTOF10: 7
PAINLEVEL_OUTOF10: 9
PAINLEVEL_OUTOF10: 10

## 2022-04-25 ASSESSMENT — ENCOUNTER SYMPTOMS
CHEST TIGHTNESS: 0
SHORTNESS OF BREATH: 0
COUGH: 0
WHEEZING: 0
SORE THROAT: 0
VOMITING: 0
BACK PAIN: 1
RHINORRHEA: 0
ABDOMINAL PAIN: 1
CONSTIPATION: 0
EYE PAIN: 0
NAUSEA: 0
DIARRHEA: 0

## 2022-04-25 ASSESSMENT — PAIN DESCRIPTION - DESCRIPTORS
DESCRIPTORS: ACHING
DESCRIPTORS: SHARP;THROBBING
DESCRIPTORS: SORE;STABBING;SHOOTING

## 2022-04-25 NOTE — PLAN OF CARE
Problem: Discharge Planning  Goal: Discharge to home or other facility with appropriate resources  4/25/2022 0957 by Wood Quinones RN  Outcome: Progressing  4/25/2022 0147 by Sayra Hernandez RN  Outcome: Progressing     Problem: Pain  Goal: Verbalizes/displays adequate comfort level or baseline comfort level  4/25/2022 0957 by Wood Quinones RN  Outcome: Progressing  4/25/2022 0147 by Sayra Hernandez RN  Outcome: Progressing

## 2022-04-25 NOTE — PROGRESS NOTES
Patient admitted from ER to 211, with the following belongings shoes, shorts, shirt, purse, phone, , wallet, lotion, lip gloss, perfume, credit cards,  $265 cash. placed on monitor, patient oriented to room and unit visiting hours. Patient guide at bedside, reviewed patient rights and responsibilities. MRSA nasal swab obtained. Bed alarm on. Call light within reach.  Aida Reyes RN

## 2022-04-25 NOTE — CARE COORDINATION
Pt admit ICU for DKA, initially on insulin drip, changed to ISS. Pending endocrinology consult. Pt has hx of DM. Lives with grandmother. Will follow for insulin needs at home. Pt sleeping at this time. PCP is Dr Esau Angel and she does follow with Dr Reddy Bolus for Transition of Care is related to the following treatment goals: home     The Patient and/or patient representative pt was provided with a choice of provider and agrees   with the discharge plan. [x] Yes [] No    Freedom of choice list was provided with basic dialogue that supports the patient's individualized plan of care/goals, treatment preferences and shares the quality data associated with the providers.  [x] Yes [] No

## 2022-04-25 NOTE — ED NOTES
Nurse to Nurse report given to Toby Mcguire in ICU. Will come down to transport patient to ICU.      Artur Ventura RN  04/24/22 [id number]

## 2022-04-25 NOTE — PLAN OF CARE
Problem: Discharge Planning  Goal: Discharge to home or other facility with appropriate resources  4/25/2022 1728 by Sangeeta Solis RN  Outcome: Progressing  4/25/2022 0957 by Jarocho Turner RN  Outcome: Progressing     Problem: Pain  Goal: Verbalizes/displays adequate comfort level or baseline comfort level  4/25/2022 1728 by Sangeeta Solis RN  Outcome: Progressing  4/25/2022 0957 by Jarocho Turner RN  Outcome: Progressing

## 2022-04-25 NOTE — CONSULTS
ENDOCRINOLOGY INITIAL CONSULTATION NOTE       Date of admission: 4/24/2022  Date of service: 4/25/2022  Admitting physician: Sabine Valentino DO   Primary Care Physician: Evon Warner DO  Consultant physician: Melina Hodgkins MD      Reason for the consultation:  DM type 1 admitted with DKA     History of Present Illness:  Services were provided through a video synchronous discussion     Padilla Velázquez is a 27 y.o. old female with PMH of DM type 1 admitted to [facility] on 4/24/2022 because of N/V and malaise and found to be in DKA, endocrine team was consulted for diabetes management. States she started her menstrual cycle 4/19 and has had decreased appetite. States she has not taken her insulin. She denies fever, chills, chest pain, cough  Admission labs , AG 25, Cr 0.7, AG 8, BHB >4.5   The patient was started on insulin drip as per DKA protocol then transitioned to sq insulin      Prior to admission  Type 1 DM was diagnosed at the age 8. Prior to admission, she was basaglar 22 units daily, Humalog with meals using carb ratio 1u:10g  + ss 1:50>150. self-blood glucose monitoring has been highly variable prior to admission. She is due for annual eye exam but denied any history of diabetic retinopathy.   The patient performs her own feet care and doesn't see podiatry service  Microvascular complications:  No Retinopathy, Nephropathy + Neuropathy   Macrovascular complications: no CAD, PVD, or Stroke    Lab Results   Component Value Date    LABA1C 15.6 04/25/2022     Inpatient diet:   Carb Restricted diet     Point of care glucose monitoring (Independently reviewed)   Recent Labs     04/25/22  0345 04/25/22  0447 04/25/22  0544 04/25/22  0659 04/25/22  0758 04/25/22  0916 04/25/22  1122 04/25/22  1620   GLUMET 109* 101* 100* 103* 134* 149* 207* 178*     Past medical history:   Past Medical History:   Diagnosis Date    Bleeding at insertion site 01/05/2018    Common femoral artery injury, right, initial encounter 01/05/2018    Depressive disorder     [facility]     DM type 1 (diabetes mellitus, type 1) ([facility]     Marijuana abuse     Non-compliance     Trichimoniasis 11/19/2021       Past surgical history:  Past Surgical History:   Procedure Laterality Date    ABDOMEN SURGERY N/A 5/9/2019    INCISION AND DRAINAGE OF SUPRAPUBIC ABSESS performed by Kannan Edmond MD at [address]      last yr       Social history:   Tobacco:   reports that she has never smoked. She has never used smokeless tobacco.  Alcohol:   reports no history of alcohol use. Drugs:   reports previous drug use. Drug: Marijuana José Miguel Waverly). Family history:    Family History   Problem Relation Age of Onset    Diabetes Maternal Grandmother     Diabetes Paternal Grandmother     Stroke Other     Thyroid Disease Neg Hx        Allergy and drug reactions:   No Known Allergies    Scheduled Meds:   insulin glargine  22 Units SubCUTAneous Daily    insulin lispro  0-12 Units SubCUTAneous TID WC    insulin lispro  0-6 Units SubCUTAneous Nightly    enoxaparin  30 mg SubCUTAneous Daily     PRN Meds:   acetaminophen, 650 mg, Q4H PRN  glucagon (rDNA), 1 mg, PRN  glucose, 4 g, PRN   Or  glucose, 8 g, PRN  polyethylene glycol, 17 g, Daily PRN  dextrose bolus (hypoglycemia), 125 mL, PRN   Or  dextrose bolus (hypoglycemia), 250 mL, PRN      Continuous Infusions:      Review of Systems  All systems reviewed.  All negative except for symptoms mentioned in HPI     OBJECTIVE    BP (!) 129/92   Pulse 106   Temp 98.5 °F (36.9 °C) (Oral)   Resp 18   Ht 5' 1\" (1.549 m)   Wt 109 lb 9.1 oz (49.7 kg)   SpO2 100%   BMI 20.70 kg/m²     Intake/Output Summary (Last 24 hours) at 4/25/2022 1904  Last data filed at 4/25/2022 1000  Gross per 24 hour   Intake 5018.55 ml   Output --   Net 5018.55 ml     Physical examination:  Due to this being a TeleHealth encounter, evaluation of the following organ systems is limited: Vitals/Constitutional/EENT/Resp/CV/GI//MS/Neuro/Skin/Heme-Lymph-Imm. Modified physical exam through Telemedicine camera    General: Communicating well via camera   Neck: no obvious neck mass. No obvious neck deformity     CVS: no distress   Chest: no distress. Chest is moving with respiration    Extremities:  no visible tremor  Skin: No visible rashes as seen from camera   Musculoskeletal: no visible deformity  Neuro: Alert and oriented to person, place, and time. Psychiatric: Normal mood and affect. Behavior is normal    Review of Laboratory Data:  I personally reviewed the following labs:   Recent Labs     04/24/22  1603 04/25/22  0540   WBC 11.5 9.9   RBC 4.65 4.17   HGB 12.9 11.7   HCT 39.5 34.1   MCV 84.9 81.8   MCH 27.7 28.1   MCHC 32.7 34.3   RDW 12.6 12.8    290   MPV 11.4 11.0     Recent Labs     04/24/22  1603 04/24/22  1632 04/24/22  1953 04/25/22  0020 04/25/22  0540   *  --   --  132 134   K 4.9  --   --  4.0 4.8   CL 97*  --   --  106 110*   CO2 8*  --   --  11* 14*   BUN 14  --   --  10 8   CREATININE 0.7  --   --  0.6 0.6   GLUCOSE 421*   < > 343 236* 114*   CALCIUM 8.8  --   --  7.9* 8.3*   PROT 8.1  --   --   --   --    LABALBU 4.2  --   --   --   --    BILITOT 0.3  --   --   --   --    ALKPHOS 108*  --   --   --   --    AST 13  --   --   --   --    ALT 14  --   --   --   --     < > = values in this interval not displayed.      Beta-Hydroxybutyrate   Date Value Ref Range Status   04/24/2022 >4.50 (H) 0.02 - 0.27 mmol/L Final   04/02/2022 1.96 (H) 0.02 - 0.27 mmol/L Final   01/08/2022 >4.50 (H) 0.02 - 0.27 mmol/L Final     Lab Results   Component Value Date    LABA1C 15.6 04/25/2022    LABA1C 16.8 01/08/2022    LABA1C 14.2 09/17/2021     Lab Results   Component Value Date/Time    TSH 0.489 01/08/2022 12:13 AM     Lab Results   Component Value Date    LABA1C 15.6 04/25/2022    GLUCOSE 114 04/25/2022    MALBCR 53.2 08/24/2021    LABMICR 81.9 08/24/2021    LABCREA 154 08/24/2021 Lab Results   Component Value Date    TRIG 337 01/08/2022    HDL 48 01/08/2022    LDLCALC 129 01/08/2022    CHOL 244 01/08/2022       Blood culture   Lab Results   Component Value Date    BC 5 Days no growth 02/24/2021    BC 5 Days no growth 11/10/2020       Radiology:  No orders to display     Medical Records/Labs/Images review:   I personally reviewed and summarized previous records   All labs and imaging were reviewed independently     Bob Espinoza, a 27 y.o.-old female seen today for inpatient diabetes management     Brittle type 1 DM admitted with DKA   · Patient's DM is profoundly uncontrolled due to poor compliance with insulin therapy and diet  · We recommend following insulin regimen  · Lantus 22U daily in AM   · Medium dose sliding scale   · To add prandial insulin coverage once appetite improve     · Glucose check before meals and at bed time   · Will titrate insulin dose based on blood glucose trend & insulin requirement   · Pt will follow with us after discharge. Endocrine follow up visit, Thursday 5/5 at 3:00pm     Recurrent DKA due to medications non-compliance   · Transitioned to sq insulin yesterday. · Adjusted insulin regimen as per above      Thank you for allowing us to participate in the care of this patient. Please do not hesitate to contact us with any additional questions. Arash Causey MD  Endocrinologist, [facility] Diabetes [facility] and Endocrinology   [address] [facility] [address]   Phone: [phone]  Fax: [phone]  --------------------------------------------  An electronic signature was used to authenticate this note.  Anam Mendoza MD on 4/25/2022 at 7:04 PM

## 2022-04-25 NOTE — PROGRESS NOTES
Dr. Viet Maxwell, DO,    Your patient is on a medication that requires a renal and/or weight dose adjustment. Renal/Body Weight Function Assessment:    Date Body Weight IBW  Adjusted BW SCr  CrCl Dialysis status   4/24/2022 110 lb (49.9 kg) Ideal body weight: 47.8 kg (105 lb 6.1 oz)  Adjusted ideal body weight: 48.6 kg (107 lb 3.7 oz) Serum creatinine: 0.7 mg/dL 04/24/22 1603  Estimated creatinine clearance: 94 mL/min N/a       Pharmacy has dose-adjusted the following medication(s):    Date Previous Order Adjusted Order   4/24/2022 Enoxaparin 40 mg daily Enoxaparin 30 mg daily       These changes were made per protocol according to the [facility] Renal Dosing Policy/ [facility] Pharmacist Anticoagulant Review. *Please note this dose may need readjusted if your patient's condition changes. Please contact pharmacy with any questions regarding these changes.     winifred de la rosa, [address] [place] [address]  4/24/2022  8:04 PM

## 2022-04-25 NOTE — PATIENT CARE CONFERENCE
Intensive Care Daily Quality Rounding Checklist      ICU Team Members: Bedside Nurse, Respiratory Therapy and Pharmacist, , Dr Janes Stewart, residents    ICU Day #: NUMBER: 2    Intubation Date:     Ventilator Day #:     Central Line Insertion Date:      Day #:   Arterial Line Insertion Date:       Day #:     Temporary Hemodialysis Catheter Insertion Date:     Day #     DVT Prophylaxis: lovenox    GI Prophylaxis:diet    Bey Catheter Insertion Date:       Day #:       Continued need (if yes, reason documented and discussed with physician):     Skin Issues/ Wounds and ordered treatment discussed on rounds:N    Goals/ Plans for the Day: consult diabetes education, consult endocrinology, bridge off of insulin gtt,

## 2022-04-25 NOTE — CONSULTS
OCCUPATIONAL HEALTH:    Social History     Tobacco History     Smoking Status  Never Smoker    Smokeless Tobacco Use  Never Used                Current Medications   Current Medications    enoxaparin  30 mg SubCUTAneous Daily     potassium chloride, magnesium sulfate, sodium phosphate IVPB **OR** sodium phosphate IVPB **OR** sodium phosphate IVPB, polyethylene glycol, dextrose 5 % and 0.45 % NaCl, dextrose bolus (hypoglycemia) **OR** dextrose bolus (hypoglycemia), morphine  IV Drips/Infusions   insulin 0.43 Units/hr (04/25/22 0700)    sodium chloride Stopped (04/24/22 2146)    dextrose 5 % and 0.45 % NaCl 150 mL/hr at 04/25/22 0609     Home Medications  Medications Prior to Admission: docusate sodium (COLACE) 100 MG capsule, Take 1 capsule by mouth 2 times daily  polyethylene glycol (GLYCOLAX) 17 GM/SCOOP powder, Take 17 g by mouth daily as needed (constipation)  naproxen (NAPROSYN) 500 MG tablet, Take 1 tablet by mouth 2 times daily as needed for Pain  melatonin (RA MELATONIN) 3 MG TABS tablet, Take 2 tablets by mouth daily  insulin glargine (LANTUS SOLOSTAR) 100 UNIT/ML injection pen, Inject 22 Units into the skin every morning  Insulin Pen Needle (BD PEN NEEDLE SHELBIE U/F) 32G X 4 MM MISC, Uses with insulin 4 times a day  insulin lispro, 1 Unit Dial, (HUMALOG KWIKPEN) 100 UNIT/ML SOPN, Inject 1 units per 10 grams of carbs + following sliding scale. -200 add 2U, -250 add 4U, -300 add 6U, -350 add 8U, -400 add 12U, BS over 400 add 12U  acetaminophen (TYLENOL) 500 MG tablet, Take 1 tablet by mouth every 4 hours as needed for Pain or Fever    Diet/Nutrition   Diet NPO    Allergies   Patient has no known allergies. Social History   Tobacco   reports that she has never smoked. She has never used smokeless tobacco.    Alcohol     reports no history of alcohol use. Occupational history/exposure?     Family History         Problem Relation Age of Onset    Diabetes Maternal Grandmother  Diabetes Paternal Grandmother     Stroke Other     Thyroid Disease Neg Hx        ROS   Review of Systems   Constitutional: Negative for chills, fatigue and fever. HENT: Negative for congestion, hearing loss, rhinorrhea and sore throat. Eyes: Negative for pain and visual disturbance. Respiratory: Negative for cough, chest tightness, shortness of breath and wheezing. Cardiovascular: Negative for chest pain, palpitations and leg swelling. Gastrointestinal: Positive for abdominal pain. Negative for constipation, diarrhea, nausea and vomiting. Genitourinary: Negative for difficulty urinating, dysuria and hematuria. Musculoskeletal: Positive for back pain. Negative for arthralgias and myalgias. Skin: Negative for rash and wound. Neurological: Negative for dizziness, weakness, light-headedness, numbness and headaches. Psychiatric/Behavioral: Negative for dysphoric mood. The patient is not nervous/anxious. Mechanical Ventilation Data   VENT SETTINGS (Comprehensive)     Additional Respiratory Assessments  Pulse: 101  Resp: 12  SpO2: 98 %    ABG  Lab Results   Component Value Date    PH 7.282 11/10/2020    PCO2 47.5 11/10/2020    PO2 20.8 11/10/2020    HCO3 21.9 11/10/2020    O2SAT 26.5 11/10/2020     Lab Results   Component Value Date    MODE RA 11/10/2020       Vitals    height is 5' 1\" (1.549 m) and weight is 109 lb 9.1 oz (49.7 kg). Her oral temperature is 98.4 °F (36.9 °C). Her blood pressure is 95/54 (abnormal) and her pulse is 101. Her respiration is 12 and oxygen saturation is 98%.        Temperature Range: Temp: 98.4 °F (36.9 °C) Temp  Av.9 °F (36.6 °C)  Min: 97 °F (36.1 °C)  Max: 98.6 °F (37 °C)  BP Range:  Systolic (92ZZZ), SHB:453 , Min:85 , SSB:463     Diastolic (36OCX), HIK:07, Min:54, Max:93    Pulse Range: Pulse  Av.4  Min: 101  Max: 110  Respiration Range: Resp  Av.9  Min: 11  Max: 23  Current Pulse Ox[de-identified]  SpO2: 98 %  24HR Pulse Ox Range:  SpO2  Av.7 % Min: 97 %  Max: 98 %  Oxygen Amount and Delivery:      I/O (24 Hours)  Patient Vitals for the past 8 hrs:   BP Temp Temp src Pulse Resp SpO2 Weight   04/25/22 0600 -- -- -- -- -- -- 109 lb 9.1 oz (49.7 kg)   04/25/22 0400 (!) 95/54 98.4 °F (36.9 °C) Oral 101 12 -- --   04/25/22 0300 112/70 -- -- 102 12 -- --   04/25/22 0220 112/70 -- -- 106 -- -- --   04/25/22 0200 108/65 -- -- 106 13 -- --   04/25/22 0100 110/66 -- -- 105 13 -- --   04/25/22 0000 103/67 98.6 °F (37 °C) Oral 105 11 98 % --       Intake/Output Summary (Last 24 hours) at 4/25/2022 0715  Last data filed at 4/25/2022 0546  Gross per 24 hour   Intake 5016.58 ml   Output --   Net 5016.58 ml     I/O last 3 completed shifts: In: 5016.6 [I.V.:1528.2; IV Piggyback:3488.4]  Out: -    Date 04/25/22 0000 - 04/25/22 2359   Shift 7290-8762 9733-5411 6019-1934 24 Hour Total   INTAKE   I.V.(mL/kg) 1528. 2(30.7)   1528. 2(30.7)   IV Piggyback(mL/kg) 190.2(3.8)   190.2(3.8)   Shift Total(mL/kg) 1718. 4(34.6)   1718. 4(34.6)   OUTPUT   Shift Total(mL/kg)       Weight (kg) 49.7 49.7 49.7 49.7     Patient Vitals for the past 96 hrs (Last 3 readings):   Weight   04/25/22 0600 109 lb 9.1 oz (49.7 kg)   04/24/22 1526 110 lb (49.9 kg)       Exam   Physical Exam  Vitals and nursing note reviewed. Constitutional:       General: She is not in acute distress. Appearance: She is not ill-appearing or toxic-appearing. HENT:      Head: Normocephalic and atraumatic. Nose: No congestion or rhinorrhea. Mouth/Throat:      Mouth: Mucous membranes are moist.      Pharynx: Oropharynx is clear. Eyes:      Extraocular Movements: Extraocular movements intact. Pupils: Pupils are equal, round, and reactive to light. Cardiovascular:      Rate and Rhythm: Normal rate and regular rhythm. Pulses: Normal pulses. Heart sounds: Normal heart sounds. Pulmonary:      Effort: Pulmonary effort is normal.      Breath sounds: Normal breath sounds. No wheezing or rales. Abdominal:      General: Abdomen is flat. Palpations: Abdomen is soft. Tenderness: There is abdominal tenderness (generalized). Musculoskeletal:         General: Normal range of motion. Cervical back: Normal range of motion and neck supple. Right lower leg: No edema. Left lower leg: No edema. Comments: Lumbar and sacral tenderness to palpation  SLR -  Normal internal and external rotation of the hip   Skin:     General: Skin is warm. Findings: No rash. Neurological:      General: No focal deficit present. Mental Status: She is alert and oriented to person, place, and time. Psychiatric:         Mood and Affect: Mood normal.         Behavior: Behavior normal.         Data   Old records and images have been reviewed    Lab Results   CBC     Lab Results   Component Value Date    WBC 9.9 04/25/2022    RBC 4.17 04/25/2022    HGB 11.7 04/25/2022    HCT 34.1 04/25/2022     04/25/2022    MCV 81.8 04/25/2022    MCH 28.1 04/25/2022    MCHC 34.3 04/25/2022    RDW 12.8 04/25/2022    NRBC 0.0 05/10/2019    SEGSPCT 58 09/29/2013    METASPCT 1.0 04/29/2020    LYMPHOPCT 31.5 04/25/2022    MONOPCT 7.8 04/25/2022    MYELOPCT 1.0 04/29/2020    BASOPCT 0.5 04/25/2022    MONOSABS 0.77 04/25/2022    LYMPHSABS 3.12 04/25/2022    EOSABS 0.11 04/25/2022    BASOSABS 0.05 04/25/2022       BMP   Lab Results   Component Value Date     04/25/2022    K 4.8 04/25/2022    K 4.1 04/02/2022     04/25/2022    CO2 14 04/25/2022    BUN 8 04/25/2022    CREATININE 0.6 04/25/2022    GLUCOSE 114 04/25/2022    CALCIUM 8.3 04/25/2022       LFTS  Lab Results   Component Value Date    ALKPHOS 108 04/24/2022    ALT 14 04/24/2022    AST 13 04/24/2022    PROT 8.1 04/24/2022    BILITOT 0.3 04/24/2022    BILIDIR <0.2 02/24/2021    IBILI see below 02/24/2021    LABALBU 4.2 04/24/2022       INR  No results for input(s): PROTIME, INR in the last 72 hours. APTT  No results for input(s):  APTT in the last 72 hours. Lactic Acid  Lab Results   Component Value Date    LACTA 1.8 04/24/2022    LACTA 1.3 04/02/2022    LACTA 2.7 11/16/2021        BNP   No results for input(s): BNP in the last 72 hours. Cultures     No results for input(s): BC in the last 72 hours. No results for input(s): Aye Stacey in the last 72 hours. No results for input(s): LABURIN in the last 72 hours. SYSTEMS ASSESSMENT    Neuro   No acute issues    Respiratory   No acute issues    Cardiovascular   No acute issues    GI   No acute issues, Zofran prn    Renal   No acute issues    Infectious disease   No acute issues    Heme/Onc   Lovenox DVT ppx    Endocrine  DKA in DM 1   DKA protocol   AG closed x 2, bridged starting at 8:30   Hgb A1c 15.6   Restarted home Lantus 22   Consulted endocrine - Dr. Aysha Wilson Diabetic teaching    Social/Spiritual/DNR/Other   Code status: full   Diet Diet NPO   Stress ulcer prophylaxis:    DVT prophylaxis: pharmacologic prophylaxis (with the following: enoxaparin)    Consultations needed: No   Transfer out of ICU today? Yes        Tyler Ward MD PGY-1  7:15 AM  04/25/22        I personally saw, examined and provided care for the patient. Radiographs, labs and medication list were reviewed by me independently. Review of Residents documentation was conducted and revisions were made as appropriate. I agree with the above documented exam, problem list and plan of care.         CCT excluding procedures 33 minutes    Melania Kijeff, DO

## 2022-04-26 VITALS
HEART RATE: 110 BPM | TEMPERATURE: 98.7 F | RESPIRATION RATE: 18 BRPM | BODY MASS INDEX: 20.2 KG/M2 | WEIGHT: 107 LBS | HEIGHT: 61 IN | DIASTOLIC BLOOD PRESSURE: 66 MMHG | SYSTOLIC BLOOD PRESSURE: 118 MMHG | OXYGEN SATURATION: 99 %

## 2022-04-26 LAB
ANION GAP SERPL CALCULATED.3IONS-SCNC: 14 MMOL/L (ref 7–16)
BASOPHILS ABSOLUTE: 0.04 E9/L (ref 0–0.2)
BASOPHILS RELATIVE PERCENT: 0.6 % (ref 0–2)
BUN BLDV-MCNC: 12 MG/DL (ref 6–20)
CALCIUM SERPL-MCNC: 9.6 MG/DL (ref 8.6–10.2)
CHLORIDE BLD-SCNC: 102 MMOL/L (ref 98–107)
CO2: 19 MMOL/L (ref 22–29)
CREAT SERPL-MCNC: 0.6 MG/DL (ref 0.5–1)
EOSINOPHILS ABSOLUTE: 0.04 E9/L (ref 0.05–0.5)
EOSINOPHILS RELATIVE PERCENT: 0.6 % (ref 0–6)
GFR AFRICAN AMERICAN: >60
GFR NON-AFRICAN AMERICAN: >60 ML/MIN/1.73
GLUCOSE BLD-MCNC: 292 MG/DL (ref 74–99)
HCT VFR BLD CALC: 35.8 % (ref 34–48)
HEMOGLOBIN: 12.2 G/DL (ref 11.5–15.5)
IMMATURE GRANULOCYTES #: 0.02 E9/L
IMMATURE GRANULOCYTES %: 0.3 % (ref 0–5)
LYMPHOCYTES ABSOLUTE: 3.34 E9/L (ref 1.5–4)
LYMPHOCYTES RELATIVE PERCENT: 46.2 % (ref 20–42)
MAGNESIUM: 1.8 MG/DL (ref 1.6–2.6)
MCH RBC QN AUTO: 27.8 PG (ref 26–35)
MCHC RBC AUTO-ENTMCNC: 34.1 % (ref 32–34.5)
MCV RBC AUTO: 81.5 FL (ref 80–99.9)
METER GLUCOSE: 180 MG/DL (ref 74–99)
METER GLUCOSE: 209 MG/DL (ref 74–99)
METER GLUCOSE: 357 MG/DL (ref 74–99)
MONOCYTES ABSOLUTE: 0.46 E9/L (ref 0.1–0.95)
MONOCYTES RELATIVE PERCENT: 6.4 % (ref 2–12)
MRSA CULTURE ONLY: NORMAL
NEUTROPHILS ABSOLUTE: 3.33 E9/L (ref 1.8–7.3)
NEUTROPHILS RELATIVE PERCENT: 45.9 % (ref 43–80)
PDW BLD-RTO: 12.7 FL (ref 11.5–15)
PHOSPHORUS: 3.1 MG/DL (ref 2.5–4.5)
PLATELET # BLD: 302 E9/L (ref 130–450)
PMV BLD AUTO: 10.7 FL (ref 7–12)
POTASSIUM SERPL-SCNC: 4.1 MMOL/L (ref 3.5–5)
RBC # BLD: 4.39 E12/L (ref 3.5–5.5)
SODIUM BLD-SCNC: 135 MMOL/L (ref 132–146)
WBC # BLD: 7.2 E9/L (ref 4.5–11.5)

## 2022-04-26 PROCEDURE — 6370000000 HC RX 637 (ALT 250 FOR IP): Performed by: INTERNAL MEDICINE

## 2022-04-26 PROCEDURE — 36415 COLL VENOUS BLD VENIPUNCTURE: CPT

## 2022-04-26 PROCEDURE — 84100 ASSAY OF PHOSPHORUS: CPT

## 2022-04-26 PROCEDURE — 83735 ASSAY OF MAGNESIUM: CPT

## 2022-04-26 PROCEDURE — 99239 HOSP IP/OBS DSCHRG MGMT >30: CPT | Performed by: INTERNAL MEDICINE

## 2022-04-26 PROCEDURE — 99232 SBSQ HOSP IP/OBS MODERATE 35: CPT | Performed by: INTERNAL MEDICINE

## 2022-04-26 PROCEDURE — 85025 COMPLETE CBC W/AUTO DIFF WBC: CPT

## 2022-04-26 PROCEDURE — 80048 BASIC METABOLIC PNL TOTAL CA: CPT

## 2022-04-26 PROCEDURE — 82962 GLUCOSE BLOOD TEST: CPT

## 2022-04-26 RX ORDER — INSULIN LISPRO 100 [IU]/ML
5 INJECTION, SOLUTION INTRAVENOUS; SUBCUTANEOUS
Status: DISCONTINUED | OUTPATIENT
Start: 2022-04-26 | End: 2022-04-26 | Stop reason: HOSPADM

## 2022-04-26 RX ORDER — INSULIN GLARGINE 100 [IU]/ML
22 INJECTION, SOLUTION SUBCUTANEOUS NIGHTLY
Qty: 10 PEN | Refills: 3 | Status: SHIPPED | OUTPATIENT
Start: 2022-04-26 | End: 2022-04-26 | Stop reason: SDUPTHER

## 2022-04-26 RX ORDER — INSULIN GLARGINE 100 [IU]/ML
22 INJECTION, SOLUTION SUBCUTANEOUS EVERY MORNING
Qty: 10 PEN | Refills: 3
Start: 2022-04-26 | End: 2022-05-02 | Stop reason: SDUPTHER

## 2022-04-26 RX ORDER — INSULIN LISPRO 100 [IU]/ML
INJECTION, SOLUTION INTRAVENOUS; SUBCUTANEOUS
Qty: 10 PEN | Refills: 2 | Status: ON HOLD | OUTPATIENT
Start: 2022-04-26 | End: 2022-10-05 | Stop reason: HOSPADM

## 2022-04-26 RX ADMIN — ACETAMINOPHEN 650 MG: 325 TABLET ORAL at 08:27

## 2022-04-26 RX ADMIN — INSULIN LISPRO 5 UNITS: 100 INJECTION, SOLUTION INTRAVENOUS; SUBCUTANEOUS at 11:37

## 2022-04-26 RX ADMIN — INSULIN LISPRO 4 UNITS: 100 INJECTION, SOLUTION INTRAVENOUS; SUBCUTANEOUS at 16:24

## 2022-04-26 RX ADMIN — INSULIN LISPRO 2 UNITS: 100 INJECTION, SOLUTION INTRAVENOUS; SUBCUTANEOUS at 11:37

## 2022-04-26 RX ADMIN — INSULIN LISPRO 10 UNITS: 100 INJECTION, SOLUTION INTRAVENOUS; SUBCUTANEOUS at 06:38

## 2022-04-26 RX ADMIN — INSULIN GLARGINE 22 UNITS: 100 INJECTION, SOLUTION SUBCUTANEOUS at 08:21

## 2022-04-26 RX ADMIN — INSULIN LISPRO 5 UNITS: 100 INJECTION, SOLUTION INTRAVENOUS; SUBCUTANEOUS at 16:24

## 2022-04-26 ASSESSMENT — PAIN SCALES - GENERAL: PAINLEVEL_OUTOF10: 7

## 2022-04-26 NOTE — DISCHARGE SUMMARY
[facility] EMERGENCY SERVICE Physician Discharge Summary       [address] Lobo Maria MD  [facility] [address]  [phone]    On 5/5/2022  Endocrine follow up visit, Thursday 5/5 at 3:00pm      Activity level: as abran    Diet: ADULT DIET; Regular; 3 carb choices (45 gm/meal)    Dispo:home    Condition at discharge: fair      Patient ID:  Yola Rainey  [id number]  71 y.o.  1995    Admit date: 4/24/2022    Discharge date and time:  4/26/2022  6:10 PM    Admission Diagnoses: Principal Problem:    DKA, type 1, not at goal [facility])  Resolved Problems:    * No resolved hospital problems. *      Discharge Diagnoses: Principal Problem:    DKA, type 1, not at goal [facility])  Resolved Problems:    * No resolved hospital problems. *    dka  Dm type 1 uncontrolled  Dehydration  Elevated bp without dx of htn  hypophosphatemia      Consults:  IP CONSULT TO CRITICAL CARE  IP CONSULT TO CRITICAL CARE  IP CONSULT TO ENDOCRINOLOGY    Procedures: none    Hospital Course: Patient was admitted with DKA, type 1, not at goal [facility]) [E10.10]  Diabetic ketoacidosis without coma associated with type 1 diabetes mellitus ([facility] [address] [E10.10]. Patient is a 46-year-old female with diabetes mellitus type 1 and apparently frequent admissions with DKA presents with nausea, vomiting, abdominal pain. Patient normally is on Lantus 22 units in the a.m. as well as lispro sliding scale. Patient states that she started her menstrual cycle a day ago and did not take her diabetic regimen as she was not eating. She thinks that this may have triggered her DKA. Otherwise she denies any infectious complaints. Patient states that she is normally otherwise compliant. She denies following up with endocrinologist.    Pt seen by consultants. Pt initially placed in icu. Pt improved on insulin gtt and had fluid and electrolyte management. Pt was able to be transferred out of icu and continued to improve.  On day of discharge pt denied fevers, chills,n/v and had no further complaints. Discharge planning d/w pt. Time given for questions and all questions answered. Discharge Exam:  Vitals:    04/25/22 1515 04/25/22 2115 04/26/22 0016 04/26/22 0815   BP: (!) 129/92 137/89  118/66   Pulse: 106 107  110   Resp: 18 16  18   Temp: 98.5 °F (36.9 °C) 98.4 °F (36.9 °C)  98.7 °F (37.1 °C)   TempSrc: Oral Oral  Oral   SpO2: 100% 100%  99%   Weight:   107 lb (48.5 kg)    Height:           Skin: warm and dry, no rash or erythema  Pulmonary/Chest: clear to auscultation bilaterally- no wheezes, rales or rhonchi, normal air movement, no respiratory distress  Cardiovascular: rhythm reg at rate of 104  Abdomen: soft, non-tender, non-distended, normal bowel sounds, no masses or organomegaly  Extremities: no cyanosis, no clubbing and no edema  I/O last 3 completed shifts: In: 5258.6 [P.O.:240; I.V.:1530.2; IV Piggyback:3488.4]  Out: -   I/O this shift:  In: 1200 [P.O.:1200]  Out: -       LABS:  Recent Labs     04/25/22  0020 04/25/22  0540 04/26/22  0730    134 135   K 4.0 4.8 4.1    110* 102   CO2 11* 14* 19*   BUN 10 8 12   CREATININE 0.6 0.6 0.6   GLUCOSE 236* 114* 292*   CALCIUM 7.9* 8.3* 9.6       Recent Labs     04/24/22  1603 04/25/22  0540 04/26/22  0824   WBC 11.5 9.9 7.2   RBC 4.65 4.17 4.39   HGB 12.9 11.7 12.2   HCT 39.5 34.1 35.8   MCV 84.9 81.8 81.5   MCH 27.7 28.1 27.8   MCHC 32.7 34.3 34.1   RDW 12.6 12.8 12.7    290 302   MPV 11.4 11.0 10.7       No results for input(s): POCGLU in the last 72 hours.     CBC with Differential:    Lab Results   Component Value Date    WBC 7.2 04/26/2022    RBC 4.39 04/26/2022    HGB 12.2 04/26/2022    HCT 35.8 04/26/2022     04/26/2022    MCV 81.5 04/26/2022    MCH 27.8 04/26/2022    MCHC 34.1 04/26/2022    RDW 12.7 04/26/2022    NRBC 0.0 05/10/2019    SEGSPCT 58 09/29/2013    METASPCT 1.0 04/29/2020    LYMPHOPCT 46.2 04/26/2022    MONOPCT 6.4 04/26/2022    MYELOPCT 1.0 04/29/2020    BASOPCT 0.6 04/26/2022    MONOSABS 0.46 04/26/2022    LYMPHSABS 3.34 04/26/2022    EOSABS 0.04 04/26/2022    BASOSABS 0.04 04/26/2022     BMP:    Lab Results   Component Value Date     04/26/2022    K 4.1 04/26/2022    K 4.1 04/02/2022     04/26/2022    CO2 19 04/26/2022    BUN 12 04/26/2022    LABALBU 4.2 04/24/2022    CREATININE 0.6 04/26/2022    CALCIUM 9.6 04/26/2022    GFRAA >60 04/26/2022    LABGLOM >60 04/26/2022    GLUCOSE 292 04/26/2022     Magnesium:    Lab Results   Component Value Date    MG 1.8 04/26/2022     Phosphorus:    Lab Results   Component Value Date    PHOS 3.1 04/26/2022       Imaging:   No orders to display       Patient Instructions:      Medication List      CONTINUE taking these medications    acetaminophen 500 MG tablet  Commonly known as: TYLENOL  Take 1 tablet by mouth every 4 hours as needed for Pain or Fever     BD Pen Needle Camila U/F 32G X 4 MM Misc  Generic drug: Insulin Pen Needle  Uses with insulin 4 times a day     docusate sodium 100 MG capsule  Commonly known as: COLACE  Take 1 capsule by mouth 2 times daily     insulin lispro (1 Unit Dial) 100 UNIT/ML Sopn  Commonly known as: HumaLOG KwikPen  Inject 1 units per 10 grams of carbs + following sliding scale.  -200 add 2U, -250 add 4U, -300 add 6U, -350 add 8U, -400 add 12U, BS over 400 add 12U     Lantus SoloStar 100 UNIT/ML injection pen  Generic drug: insulin glargine  Inject 22 Units into the skin every morning     melatonin 3 MG Tabs tablet  Commonly known as: RA Melatonin  Take 2 tablets by mouth daily     naproxen 500 MG tablet  Commonly known as: NAPROSYN  Take 1 tablet by mouth 2 times daily as needed for Pain     polyethylene glycol 17 GM/SCOOP powder  Commonly known as: GLYCOLAX  Take 17 g by mouth daily as needed (constipation)           Where to Get Your Medications      These medications were sent to [address], [address] [facility].  [place] Pat[place] [phone] Kathy Garay [phone]  [address] [facility].[place] [address]    Phone: [phone]   · insulin lispro (1 Unit Dial) 100 UNIT/ML Sopn     Information about where to get these medications is not yet available    Ask your nurse or doctor about these medications  · Lantus SoloStar 100 UNIT/ML injection pen           Total time for discharge is 37 min    Signed:  Electronically signed by Cristofer Otoole DO on 4/26/2022 at 6:10 PM

## 2022-04-26 NOTE — DISCHARGE INSTR - DIET

## 2022-04-26 NOTE — PATIENT CARE CONFERENCE
P Quality Flow/Interdisciplinary Rounds Progress Note        Quality Flow Rounds held on April 26, 2022    Disciplines Attending:  Bedside Nurse, ,  and Nursing Unit Leadership    Andrew Gloria was admitted on 4/24/2022  3:15 PM    Anticipated Discharge Date:       Disposition:    Gavino Score:  Gavino Scale Score: 22    Readmission Risk              Risk of Unplanned Readmission:  19           Discussed patient goal for the day, patient clinical progression, and barriers to discharge.   The following Goal(s) of the Day/Commitment(s) have been identified:  Diagnostics - Report Results      Josie Guy RN  April 26, 2022

## 2022-04-26 NOTE — PLAN OF CARE
Problem: Discharge Planning  Goal: Discharge to home or other facility with appropriate resources  4/25/2022 2316 by Pavel Calderon RN  Outcome: Progressing     Problem: Pain  Goal: Verbalizes/displays adequate comfort level or baseline comfort level  4/25/2022 2316 by Pavel Calderon RN  Outcome: Progressing

## 2022-04-26 NOTE — PLAN OF CARE
Problem: Pain  Goal: Verbalizes/displays adequate comfort level or baseline comfort level  4/26/2022 1028 by Kevin Louie RN  Outcome: Progressing  4/25/2022 2316 by Syed Issa RN  Outcome: Progressing

## 2022-04-26 NOTE — PROGRESS NOTES
ENDOCRINOLOGY PROGRESS NOTE       Date of admission: 4/24/2022  Date of service: 4/26/2022  Admitting physician: Jesika Ahmadi DO   Primary Care Physician: Lalitha Hall DO  Consultant physician: Terri James MD      Reason for the consultation:  DM type 1 admitted with DKA     History of Present Illness:  Services were provided through a video synchronous discussion     Albina Schneider is a 27 y.o. old female with PMH of DM type 1 admitted to [facility] on 4/24/2022 because of N/V and malaise and found to be in DKA, endocrine team was consulted for diabetes management. States she started her menstrual cycle 4/19 and has had decreased appetite. States she has not taken her insulin. She denies fever, chills, chest pain, cough    Subjective   Seen today, no acute issues, BG good      Inpatient diet:   Carb Restricted diet     Point of care glucose monitoring (Independently reviewed)   Recent Labs     04/25/22  0659 04/25/22  0758 04/25/22  0916 04/25/22  1122 04/25/22  1620 04/25/22  2119 04/26/22  0559 04/26/22  1113   GLUMET 103* 134* 149* 207* 178* 291* 357* 180*     Scheduled Meds:   insulin lispro  5 Units SubCUTAneous TID WC    insulin glargine  22 Units SubCUTAneous Daily    insulin lispro  0-12 Units SubCUTAneous TID WC    insulin lispro  0-6 Units SubCUTAneous Nightly    enoxaparin  30 mg SubCUTAneous Daily     PRN Meds:   acetaminophen, 650 mg, Q4H PRN  glucagon (rDNA), 1 mg, PRN  glucose, 4 g, PRN   Or  glucose, 8 g, PRN  polyethylene glycol, 17 g, Daily PRN  dextrose bolus (hypoglycemia), 125 mL, PRN   Or  dextrose bolus (hypoglycemia), 250 mL, PRN      Continuous Infusions:      Review of Systems  All systems reviewed.  All negative except for symptoms mentioned in HPI     OBJECTIVE    /66   Pulse 110   Temp 98.7 °F (37.1 °C) (Oral)   Resp 18   Ht 5' 1\" (1.549 m)   Wt 107 lb (48.5 kg)   SpO2 99%   BMI 20.22 kg/m²     Intake/Output Summary (Last 24 hours) at 4/26/2022 1301  Last data filed at 4/26/2022 1143  Gross per 24 hour   Intake 960 ml   Output --   Net 960 ml     Physical examination:  Due to this being a TeleHealth encounter, evaluation of the following organ systems is limited: Vitals/Constitutional/EENT/Resp/CV/GI//MS/Neuro/Skin/Heme-Lymph-Imm. Modified physical exam through Telemedicine camera    General: Communicating well via camera   Neck: no obvious neck mass. No obvious neck deformity     CVS: no distress   Chest: no distress. Chest is moving with respiration    Extremities:  no visible tremor  Skin: No visible rashes as seen from camera   Musculoskeletal: no visible deformity  Neuro: Alert and oriented to person, place, and time. Psychiatric: Normal mood and affect. Behavior is normal    Review of Laboratory Data:  I personally reviewed the following labs:   Recent Labs     04/24/22  1603 04/25/22  0540 04/26/22  0824   WBC 11.5 9.9 7.2   RBC 4.65 4.17 4.39   HGB 12.9 11.7 12.2   HCT 39.5 34.1 35.8   MCV 84.9 81.8 81.5   MCH 27.7 28.1 27.8   MCHC 32.7 34.3 34.1   RDW 12.6 12.8 12.7    290 302   MPV 11.4 11.0 10.7     Recent Labs     04/24/22  1603 04/24/22  1632 04/25/22  0020 04/25/22  0540 04/26/22  0730   *   < > 132 134 135   K 4.9   < > 4.0 4.8 4.1   CL 97*   < > 106 110* 102   CO2 8*   < > 11* 14* 19*   BUN 14   < > 10 8 12   CREATININE 0.7   < > 0.6 0.6 0.6   GLUCOSE 421*   < > 236* 114* 292*   CALCIUM 8.8   < > 7.9* 8.3* 9.6   PROT 8.1  --   --   --   --    LABALBU 4.2  --   --   --   --    BILITOT 0.3  --   --   --   --    ALKPHOS 108*  --   --   --   --    AST 13  --   --   --   --    ALT 14  --   --   --   --     < > = values in this interval not displayed.      Beta-Hydroxybutyrate   Date Value Ref Range Status   04/24/2022 >4.50 (H) 0.02 - 0.27 mmol/L Final   04/02/2022 1.96 (H) 0.02 - 0.27 mmol/L Final   01/08/2022 >4.50 (H) 0.02 - 0.27 mmol/L Final     Lab Results   Component Value Date    LABA1C 15.6 04/25/2022    LABA1C 16.8 01/08/2022 LABA1C 14.2 09/17/2021     Lab Results   Component Value Date/Time    TSH 0.489 01/08/2022 12:13 AM     Lab Results   Component Value Date    LABA1C 15.6 04/25/2022    GLUCOSE 292 04/26/2022    MALBCR 53.2 08/24/2021    LABMICR 81.9 08/24/2021    LABCREA 154 08/24/2021     Lab Results   Component Value Date    TRIG 337 01/08/2022    HDL 48 01/08/2022    LDLCALC 129 01/08/2022    CHOL 244 01/08/2022       Blood culture   Lab Results   Component Value Date    BC 5 Days no growth 02/24/2021    BC 5 Days no growth 11/10/2020       Radiology:  No orders to display     Medical Records/Labs/Images review:   I personally reviewed and summarized previous records   All labs and imaging were reviewed independently     Bob Espinoza, a 27 y.o.-old female seen today for inpatient diabetes management     Brittle type 1 DM admitted with DKA   · Uncontrolled   · While inpatient, We recommend following insulin regimen  · Lantus 22U daily in AM   · Humalog 5u with meals   · Medium dose sliding scale   · Glucose check before meals and at bed time   · Will titrate insulin dose based on blood glucose trend & insulin requirement   · Ok to dc from endocrine standpoint. On discharge, we recommend Lantus 22 units daily, Humalog 6u with meals + medium sliding scale   · Pt will follow with us after discharge. Endocrine follow up visit, Thursday 5/5 at 3:00pm     Recurrent DKA due to medications non-compliance   · Transitioned to sq insulin yesterday. · Adjusted insulin regimen as per above      Thank you for allowing us to participate in the care of this patient. Please do not hesitate to contact us with any additional questions. Colby Brown MD  Endocrinologist, [facility] Diabetes Care and Endocrinology   [address] [facility] [address]   Phone: [phone]  Fax: [phone]  --------------------------------------------  An electronic signature was used to authenticate this note.  Wendy Montano MD on 4/26/2022 at 1:01 PM

## 2022-04-29 ENCOUNTER — TELEPHONE (OUTPATIENT)
Dept: PRIMARY CARE CLINIC | Age: 27
End: 2022-04-29

## 2022-04-29 NOTE — TELEPHONE ENCOUNTER
----- Message from Wilbert Francisco sent at 4/29/2022 10:40 AM EDT -----  Subject: Message to Provider    QUESTIONS  Information for Provider? nighat called to notify pcp that pt was in   hosp apr 24-26th,  name is julianne allen [place] call [phone],   pt was put in [place] [address] for diabetes blood sugar levels  ---------------------------------------------------------------------------  --------------  CALL BACK INFO  What is the best way for the office to contact you? OK to leave message on   voicemail  Preferred Call Back Phone Number? [phone]  ---------------------------------------------------------------------------  --------------  SCRIPT ANSWERS  Relationship to Patient? Third Party  Third Party Type?  Hospital?   Representative Name? Colton Mcqueen

## 2022-05-02 ENCOUNTER — TELEMEDICINE (OUTPATIENT)
Dept: PRIMARY CARE CLINIC | Age: 27
End: 2022-05-02
Payer: COMMERCIAL

## 2022-05-02 DIAGNOSIS — E11.65 CONTROLLED TYPE 2 DIABETES MELLITUS WITH HYPERGLYCEMIA, WITH LONG-TERM CURRENT USE OF INSULIN (HCC): ICD-10-CM

## 2022-05-02 DIAGNOSIS — G62.9 NEUROPATHY: Primary | ICD-10-CM

## 2022-05-02 DIAGNOSIS — Z79.4 CONTROLLED TYPE 2 DIABETES MELLITUS WITH HYPERGLYCEMIA, WITH LONG-TERM CURRENT USE OF INSULIN (HCC): ICD-10-CM

## 2022-05-02 DIAGNOSIS — E10.65 UNCONTROLLED TYPE 1 DIABETES MELLITUS WITH HYPERGLYCEMIA (HCC): ICD-10-CM

## 2022-05-02 DIAGNOSIS — N94.6 MENSTRUAL CRAMP: ICD-10-CM

## 2022-05-02 PROCEDURE — 1111F DSCHRG MED/CURRENT MED MERGE: CPT | Performed by: INTERNAL MEDICINE

## 2022-05-02 PROCEDURE — 3046F HEMOGLOBIN A1C LEVEL >9.0%: CPT | Performed by: INTERNAL MEDICINE

## 2022-05-02 PROCEDURE — 99213 OFFICE O/P EST LOW 20 MIN: CPT | Performed by: INTERNAL MEDICINE

## 2022-05-02 PROCEDURE — 2022F DILAT RTA XM EVC RTNOPTHY: CPT | Performed by: INTERNAL MEDICINE

## 2022-05-02 PROCEDURE — G8420 CALC BMI NORM PARAMETERS: HCPCS | Performed by: INTERNAL MEDICINE

## 2022-05-02 PROCEDURE — G8427 DOCREV CUR MEDS BY ELIG CLIN: HCPCS | Performed by: INTERNAL MEDICINE

## 2022-05-02 PROCEDURE — 1036F TOBACCO NON-USER: CPT | Performed by: INTERNAL MEDICINE

## 2022-05-02 RX ORDER — PEN NEEDLE, DIABETIC 32GX 5/32"
NEEDLE, DISPOSABLE MISCELLANEOUS
Qty: 250 EACH | Refills: 5 | Status: SHIPPED | OUTPATIENT
Start: 2022-05-02

## 2022-05-02 RX ORDER — INSULIN GLARGINE 100 [IU]/ML
22 INJECTION, SOLUTION SUBCUTANEOUS EVERY MORNING
Qty: 10 PEN | Refills: 3 | Status: ON HOLD
Start: 2022-05-02 | End: 2022-08-17 | Stop reason: SDUPTHER

## 2022-05-02 RX ORDER — INSULIN ASPART 100 [IU]/ML
INJECTION, SOLUTION INTRAVENOUS; SUBCUTANEOUS
Qty: 5 PEN | Refills: 3 | Status: ON HOLD
Start: 2022-05-02 | End: 2022-06-07 | Stop reason: HOSPADM

## 2022-05-02 ASSESSMENT — PATIENT HEALTH QUESTIONNAIRE - PHQ9
1. LITTLE INTEREST OR PLEASURE IN DOING THINGS: 0
SUM OF ALL RESPONSES TO PHQ QUESTIONS 1-9: 0
10. IF YOU CHECKED OFF ANY PROBLEMS, HOW DIFFICULT HAVE THESE PROBLEMS MADE IT FOR YOU TO DO YOUR WORK, TAKE CARE OF THINGS AT HOME, OR GET ALONG WITH OTHER PEOPLE: 0
5. POOR APPETITE OR OVEREATING: 0
2. FEELING DOWN, DEPRESSED OR HOPELESS: 0
7. TROUBLE CONCENTRATING ON THINGS, SUCH AS READING THE NEWSPAPER OR WATCHING TELEVISION: 0
8. MOVING OR SPEAKING SO SLOWLY THAT OTHER PEOPLE COULD HAVE NOTICED. OR THE OPPOSITE, BEING SO FIGETY OR RESTLESS THAT YOU HAVE BEEN MOVING AROUND A LOT MORE THAN USUAL: 0
6. FEELING BAD ABOUT YOURSELF - OR THAT YOU ARE A FAILURE OR HAVE LET YOURSELF OR YOUR FAMILY DOWN: 0
3. TROUBLE FALLING OR STAYING ASLEEP: 0
4. FEELING TIRED OR HAVING LITTLE ENERGY: 0
SUM OF ALL RESPONSES TO PHQ9 QUESTIONS 1 & 2: 0
SUM OF ALL RESPONSES TO PHQ QUESTIONS 1-9: 0
9. THOUGHTS THAT YOU WOULD BE BETTER OFF DEAD, OR OF HURTING YOURSELF: 0
SUM OF ALL RESPONSES TO PHQ QUESTIONS 1-9: 0
SUM OF ALL RESPONSES TO PHQ QUESTIONS 1-9: 0

## 2022-05-02 NOTE — PROGRESS NOTES
5/2/22    Kristine Johnson, a female of 27 y.o. presents today for Follow-Up from Hospital and Care Management (transitional care management visit)    She was recently admitted to the hospital for DKA. Prior to this she was having problems with menstrual cramping nausea and vomiting. At that time she stopped taking her insulin. During hospitalization she was admitted to the ICU and treated with an insulin drip hydrated and sent home on her home medications. She has been having issues with numbness and tingling at the bottoms of her feet    There were no vitals taken for this visit. Review of Systems  Constitutional:Negative for activity change, appetite change, chills, fatigue and fever. Respiratory: Negative for choking, chest tightness, shortness of breath and wheezing. Cardiovascular: Negative for chest pain, palpitations and leg swelling. Gastrointestinal: Negative for abdominal distention, constipation, diarrhea, nausea and vomiting. Musculoskeletal: Negative for arthralgias, back pain, gait problem and joint swelling. Neurological: Negative for dizziness, weakness,numbness and headaches.      Patient Active Problem List    Diagnosis Date Noted    Dehydration     Elevated BP without diagnosis of hypertension     Hypophosphatemia     Nausea and vomiting     Hyperglycemia due to diabetes mellitus ([facility] [address] 09/13/2021    Nausea, vomiting and diarrhea 12/14/2020    Generalized abdominal pain     Depressive disorder     COVID-19 virus infection     Severe protein-calorie malnutrition ([address] 08/31/2020    Lactic acid acidosis 08/31/2020    DKA, type 1, not at goal [facility]) 08/31/2020    Trichomoniasis     Uncontrolled type 1 diabetes mellitus with hyperglycemia ([facility] [address] 07/01/2020    Hypoglycemia 05/22/2020    Vitamin D deficiency 05/22/2020    Diabetic ketoacidosis without coma associated with type 1 diabetes mellitus ([facility] [address] 05/04/2020    Acidosis     Severe dehydration     Hypokalemia  Pyelonephritis     Hyperglycemia 03/06/2020    Leukocytosis     Elevated lactic acid level     DKA, type 1 ([facility] [address] 11/12/2018    Diabetes mellitus type 1, uncontrolled ([facility] [address] 11/12/2018    Personal history of noncompliance with medical treatment, presenting hazards to health 11/12/2018   No Known Allergies  Current Outpatient Medications on File Prior to Visit   Medication Sig Dispense Refill    insulin lispro, 1 Unit Dial, (HUMALOG KWIKPEN) 100 UNIT/ML SOPN Inject 1 units per 10 grams of carbs + following sliding scale. -200 add 2U, -250 add 4U, -300 add 6U, -350 add 8U, -400 add 12U, BS over 400 add 12U 10 pen 2    naproxen (NAPROSYN) 500 MG tablet Take 1 tablet by mouth 2 times daily as needed for Pain 60 tablet 0    melatonin (RA MELATONIN) 3 MG TABS tablet Take 2 tablets by mouth daily 60 tablet 0    docusate sodium (COLACE) 100 MG capsule Take 1 capsule by mouth 2 times daily (Patient not taking: Reported on 5/2/2022) 60 capsule 0    polyethylene glycol (GLYCOLAX) 17 GM/SCOOP powder Take 17 g by mouth daily as needed (constipation) (Patient not taking: Reported on 5/2/2022) 510 g 0    acetaminophen (TYLENOL) 500 MG tablet Take 1 tablet by mouth every 4 hours as needed for Pain or Fever 20 tablet 0     No current facility-administered medications on file prior to visit. Physical Exam   Constitutional:  Oriented to person, place, and time. Appears well-developed and well-nourished. No acute distress. HENT: No sinus tenderness or lymphadenopathy  Head: Normocephalic and atraumatic. Eyes: Eyes exhibits no discharge. No scleral icterus present. Neck: No tracheal deviation present. No thyromegaly present. Cardiovascular: Normal rate, regular rhythm, normal heart sounds and intact distal pulses. Exam reveals no gallop nor friction rub. No murmur heard. Pulmonary: Effort normal and breath sounds normal. No respiratory distress. No wheezes or rales. Abdomen: No signs of rigidity rebound or organomegaly  Musculoskeletal:  No tenderness to palpation  Neurological:Alert and oriented to person, place, and time. Skin: No diaphoresis. Psychiatric: Normal mood and affect. Behavior is Normal.     ASSESSMENT AND PLAN:    Assessment  Diagnoses and all orders for this visit:  Neuropathy  -     EMG; Future  Controlled type 2 diabetes mellitus with hyperglycemia, with long-term current use of insulin (HCC)  Uncontrolled type 1 diabetes mellitus with hyperglycemia ([facility])  Other orders  -     insulin aspart (NOVOLOG FLEXPEN) 100 UNIT/ML injection pen; Inject 3 times daily before meals, per sliding scale  -     Insulin Pen Needle (BD PEN NEEDLE SHELBIE U/F) 32G X 4 MM MISC; Uses with insulin 4 times a day  -     insulin glargine (LANTUS SOLOSTAR) 100 UNIT/ML injection pen; Inject 22 Units into the skin every morning      Plan    1. She was advised to take half of her doses of insulin even when she is nauseated  2. I will obtain an EMG of her lower extremities given her symptoms suggestive of neuropathy  3. She was advised to follow-up with her gynecologist for her menstrual cramping which she may benefit from OCPs to better control her symptoms and reduce her nausea    No follow-ups on file.     Electronically signed by Evaristo Erickson DO on 5/2/2022 at 4:34 PM    Evaristo Erickson DO

## 2022-06-05 ENCOUNTER — APPOINTMENT (OUTPATIENT)
Dept: ULTRASOUND IMAGING | Age: 27
DRG: 420 | End: 2022-06-05
Payer: COMMERCIAL

## 2022-06-05 ENCOUNTER — APPOINTMENT (OUTPATIENT)
Dept: CT IMAGING | Age: 27
DRG: 420 | End: 2022-06-05
Payer: COMMERCIAL

## 2022-06-05 ENCOUNTER — HOSPITAL ENCOUNTER (INPATIENT)
Age: 27
LOS: 2 days | Discharge: HOME | DRG: 420 | End: 2022-06-07
Attending: EMERGENCY MEDICINE | Admitting: INTERNAL MEDICINE
Payer: COMMERCIAL

## 2022-06-05 ENCOUNTER — APPOINTMENT (OUTPATIENT)
Dept: GENERAL RADIOLOGY | Age: 27
DRG: 420 | End: 2022-06-05
Payer: COMMERCIAL

## 2022-06-05 DIAGNOSIS — E10.10 DIABETIC KETOACIDOSIS WITHOUT COMA ASSOCIATED WITH TYPE 1 DIABETES MELLITUS (HCC): Primary | ICD-10-CM

## 2022-06-05 DIAGNOSIS — R10.84 GENERALIZED ABDOMINAL PAIN: ICD-10-CM

## 2022-06-05 DIAGNOSIS — R11.2 NAUSEA AND VOMITING, INTRACTABILITY OF VOMITING NOT SPECIFIED, UNSPECIFIED VOMITING TYPE: ICD-10-CM

## 2022-06-05 LAB
ACETAMINOPHEN LEVEL: <5 MCG/ML (ref 10–30)
ALBUMIN SERPL-MCNC: 4.8 G/DL (ref 3.5–5.2)
ALP BLD-CCNC: 115 U/L (ref 35–104)
ALT SERPL-CCNC: 26 U/L (ref 0–32)
AMORPHOUS: ABNORMAL
AMPHETAMINE SCREEN, URINE: NOT DETECTED
ANION GAP SERPL CALCULATED.3IONS-SCNC: 33 MMOL/L (ref 7–16)
AST SERPL-CCNC: 24 U/L (ref 0–31)
BACTERIA: ABNORMAL /HPF
BARBITURATE SCREEN URINE: NOT DETECTED
BASOPHILS ABSOLUTE: 0.06 E9/L (ref 0–0.2)
BASOPHILS RELATIVE PERCENT: 0.6 % (ref 0–2)
BENZODIAZEPINE SCREEN, URINE: NOT DETECTED
BETA-HYDROXYBUTYRATE: >4.5 MMOL/L (ref 0.02–0.27)
BILIRUB SERPL-MCNC: 0.5 MG/DL (ref 0–1.2)
BILIRUBIN DIRECT: <0.2 MG/DL (ref 0–0.3)
BILIRUBIN URINE: NEGATIVE
BILIRUBIN, INDIRECT: ABNORMAL MG/DL (ref 0–1)
BLOOD, URINE: NEGATIVE
BUN BLDV-MCNC: 21 MG/DL (ref 6–20)
CALCIUM SERPL-MCNC: 11.2 MG/DL (ref 8.6–10.2)
CANNABINOID SCREEN URINE: NOT DETECTED
CHLORIDE BLD-SCNC: 89 MMOL/L (ref 98–107)
CLARITY: CLEAR
CO2: 9 MMOL/L (ref 22–29)
COCAINE METABOLITE SCREEN URINE: NOT DETECTED
COLOR: YELLOW
CREAT SERPL-MCNC: 0.9 MG/DL (ref 0.5–1)
EOSINOPHILS ABSOLUTE: 0.01 E9/L (ref 0.05–0.5)
EOSINOPHILS RELATIVE PERCENT: 0.1 % (ref 0–6)
ETHANOL: <10 MG/DL (ref 0–0.08)
FENTANYL SCREEN, URINE: NOT DETECTED
GFR AFRICAN AMERICAN: >60
GFR NON-AFRICAN AMERICAN: >60 ML/MIN/1.73
GLUCOSE BLD-MCNC: 495 MG/DL
GLUCOSE BLD-MCNC: 644 MG/DL (ref 74–99)
GLUCOSE URINE: >=1000 MG/DL
HCG QUALITATIVE: NEGATIVE
HCT VFR BLD CALC: 43.6 % (ref 34–48)
HEMOGLOBIN: 14.2 G/DL (ref 11.5–15.5)
IMMATURE GRANULOCYTES #: 0.02 E9/L
IMMATURE GRANULOCYTES %: 0.2 % (ref 0–5)
KETONES, URINE: >=80 MG/DL
LACTIC ACID: 2.2 MMOL/L (ref 0.5–2.2)
LEUKOCYTE ESTERASE, URINE: NEGATIVE
LIPASE: 14 U/L (ref 13–60)
LYMPHOCYTES ABSOLUTE: 3.18 E9/L (ref 1.5–4)
LYMPHOCYTES RELATIVE PERCENT: 32.5 % (ref 20–42)
Lab: NORMAL
MAGNESIUM: 2.3 MG/DL (ref 1.6–2.6)
MCH RBC QN AUTO: 27.5 PG (ref 26–35)
MCHC RBC AUTO-ENTMCNC: 32.6 % (ref 32–34.5)
MCV RBC AUTO: 84.5 FL (ref 80–99.9)
METER GLUCOSE: 164 MG/DL (ref 74–99)
METER GLUCOSE: 452 MG/DL (ref 74–99)
METER GLUCOSE: 495 MG/DL (ref 74–99)
METER GLUCOSE: >500 MG/DL (ref 74–99)
METHADONE SCREEN, URINE: NOT DETECTED
MONOCYTES ABSOLUTE: 0.29 E9/L (ref 0.1–0.95)
MONOCYTES RELATIVE PERCENT: 3 % (ref 2–12)
NEUTROPHILS ABSOLUTE: 6.23 E9/L (ref 1.8–7.3)
NEUTROPHILS RELATIVE PERCENT: 63.6 % (ref 43–80)
NITRITE, URINE: NEGATIVE
OPIATE SCREEN URINE: NOT DETECTED
OXYCODONE URINE: NOT DETECTED
PDW BLD-RTO: 12.5 FL (ref 11.5–15)
PH UA: 5.5 (ref 5–9)
PH VENOUS: 7.16 (ref 7.35–7.45)
PHENCYCLIDINE SCREEN URINE: NOT DETECTED
PLATELET # BLD: 377 E9/L (ref 130–450)
PMV BLD AUTO: 11.3 FL (ref 7–12)
POTASSIUM SERPL-SCNC: 5.2 MMOL/L (ref 3.5–5)
PROTEIN UA: NEGATIVE MG/DL
RBC # BLD: 5.16 E12/L (ref 3.5–5.5)
RBC UA: ABNORMAL /HPF (ref 0–2)
SALICYLATE, SERUM: 2.2 MG/DL (ref 0–30)
SODIUM BLD-SCNC: 131 MMOL/L (ref 132–146)
SPECIFIC GRAVITY UA: 1.02 (ref 1–1.03)
TOTAL PROTEIN: 8.9 G/DL (ref 6.4–8.3)
TRICYCLIC ANTIDEPRESSANTS SCREEN SERUM: NEGATIVE NG/ML
UROBILINOGEN, URINE: 0.2 E.U./DL
WBC # BLD: 9.8 E9/L (ref 4.5–11.5)
WBC UA: ABNORMAL /HPF (ref 0–5)

## 2022-06-05 PROCEDURE — 83605 ASSAY OF LACTIC ACID: CPT

## 2022-06-05 PROCEDURE — 83690 ASSAY OF LIPASE: CPT

## 2022-06-05 PROCEDURE — 2500000003 HC RX 250 WO HCPCS: Performed by: EMERGENCY MEDICINE

## 2022-06-05 PROCEDURE — 84100 ASSAY OF PHOSPHORUS: CPT

## 2022-06-05 PROCEDURE — 99285 EMERGENCY DEPT VISIT HI MDM: CPT

## 2022-06-05 PROCEDURE — 82010 KETONE BODYS QUAN: CPT

## 2022-06-05 PROCEDURE — 80179 DRUG ASSAY SALICYLATE: CPT

## 2022-06-05 PROCEDURE — 82077 ASSAY SPEC XCP UR&BREATH IA: CPT

## 2022-06-05 PROCEDURE — 80143 DRUG ASSAY ACETAMINOPHEN: CPT

## 2022-06-05 PROCEDURE — 81001 URINALYSIS AUTO W/SCOPE: CPT

## 2022-06-05 PROCEDURE — 2580000003 HC RX 258: Performed by: PEDIATRICS

## 2022-06-05 PROCEDURE — 99223 1ST HOSP IP/OBS HIGH 75: CPT | Performed by: PEDIATRICS

## 2022-06-05 PROCEDURE — 96374 THER/PROPH/DIAG INJ IV PUSH: CPT

## 2022-06-05 PROCEDURE — 36415 COLL VENOUS BLD VENIPUNCTURE: CPT

## 2022-06-05 PROCEDURE — 36556 INSERT NON-TUNNEL CV CATH: CPT

## 2022-06-05 PROCEDURE — 2000000000 HC ICU R&B

## 2022-06-05 PROCEDURE — 93975 VASCULAR STUDY: CPT

## 2022-06-05 PROCEDURE — 6360000004 HC RX CONTRAST MEDICATION: Performed by: RADIOLOGY

## 2022-06-05 PROCEDURE — 2580000003 HC RX 258: Performed by: EMERGENCY MEDICINE

## 2022-06-05 PROCEDURE — 83735 ASSAY OF MAGNESIUM: CPT

## 2022-06-05 PROCEDURE — 76856 US EXAM PELVIC COMPLETE: CPT

## 2022-06-05 PROCEDURE — 80307 DRUG TEST PRSMV CHEM ANLYZR: CPT

## 2022-06-05 PROCEDURE — 74177 CT ABD & PELVIS W/CONTRAST: CPT

## 2022-06-05 PROCEDURE — 6360000002 HC RX W HCPCS: Performed by: EMERGENCY MEDICINE

## 2022-06-05 PROCEDURE — 80076 HEPATIC FUNCTION PANEL: CPT

## 2022-06-05 PROCEDURE — 6370000000 HC RX 637 (ALT 250 FOR IP): Performed by: EMERGENCY MEDICINE

## 2022-06-05 PROCEDURE — 85025 COMPLETE CBC W/AUTO DIFF WBC: CPT

## 2022-06-05 PROCEDURE — 80048 BASIC METABOLIC PNL TOTAL CA: CPT

## 2022-06-05 PROCEDURE — A4216 STERILE WATER/SALINE, 10 ML: HCPCS | Performed by: EMERGENCY MEDICINE

## 2022-06-05 PROCEDURE — 84703 CHORIONIC GONADOTROPIN ASSAY: CPT

## 2022-06-05 PROCEDURE — 82800 BLOOD PH: CPT

## 2022-06-05 PROCEDURE — 71045 X-RAY EXAM CHEST 1 VIEW: CPT

## 2022-06-05 PROCEDURE — 96375 TX/PRO/DX INJ NEW DRUG ADDON: CPT

## 2022-06-05 RX ORDER — POLYETHYLENE GLYCOL 3350 17 G/17G
17 POWDER, FOR SOLUTION ORAL DAILY PRN
Status: DISCONTINUED | OUTPATIENT
Start: 2022-06-05 | End: 2022-06-07 | Stop reason: HOSPADM

## 2022-06-05 RX ORDER — MAGNESIUM SULFATE 1 G/100ML
1000 INJECTION INTRAVENOUS PRN
Status: DISCONTINUED | OUTPATIENT
Start: 2022-06-05 | End: 2022-06-05 | Stop reason: SDUPTHER

## 2022-06-05 RX ORDER — NAPROXEN 500 MG/1
500 TABLET ORAL 2 TIMES DAILY PRN
Status: DISCONTINUED | OUTPATIENT
Start: 2022-06-05 | End: 2022-06-07 | Stop reason: HOSPADM

## 2022-06-05 RX ORDER — KETOROLAC TROMETHAMINE 30 MG/ML
15 INJECTION, SOLUTION INTRAMUSCULAR; INTRAVENOUS ONCE
Status: DISCONTINUED | OUTPATIENT
Start: 2022-06-05 | End: 2022-06-06 | Stop reason: SDUPTHER

## 2022-06-05 RX ORDER — SODIUM CHLORIDE 9 MG/ML
INJECTION, SOLUTION INTRAVENOUS CONTINUOUS
Status: DISCONTINUED | OUTPATIENT
Start: 2022-06-06 | End: 2022-06-06

## 2022-06-05 RX ORDER — DEXTROSE MONOHYDRATE 50 MG/ML
100 INJECTION, SOLUTION INTRAVENOUS PRN
Status: DISCONTINUED | OUTPATIENT
Start: 2022-06-05 | End: 2022-06-07 | Stop reason: HOSPADM

## 2022-06-05 RX ORDER — FENTANYL CITRATE 50 UG/ML
25 INJECTION, SOLUTION INTRAMUSCULAR; INTRAVENOUS ONCE
Status: DISCONTINUED | OUTPATIENT
Start: 2022-06-05 | End: 2022-06-06

## 2022-06-05 RX ORDER — SODIUM CHLORIDE 9 MG/ML
INJECTION, SOLUTION INTRAVENOUS CONTINUOUS
Status: DISCONTINUED | OUTPATIENT
Start: 2022-06-05 | End: 2022-06-05 | Stop reason: SDUPTHER

## 2022-06-05 RX ORDER — DEXTROSE AND SODIUM CHLORIDE 5; .45 G/100ML; G/100ML
INJECTION, SOLUTION INTRAVENOUS CONTINUOUS PRN
Status: DISCONTINUED | OUTPATIENT
Start: 2022-06-05 | End: 2022-06-06

## 2022-06-05 RX ORDER — POTASSIUM CHLORIDE 7.45 MG/ML
10 INJECTION INTRAVENOUS PRN
Status: DISCONTINUED | OUTPATIENT
Start: 2022-06-05 | End: 2022-06-06

## 2022-06-05 RX ORDER — POTASSIUM CHLORIDE 7.45 MG/ML
10 INJECTION INTRAVENOUS PRN
Status: DISCONTINUED | OUTPATIENT
Start: 2022-06-05 | End: 2022-06-05 | Stop reason: SDUPTHER

## 2022-06-05 RX ORDER — SODIUM CHLORIDE 9 MG/ML
INJECTION, SOLUTION INTRAVENOUS ONCE
Status: COMPLETED | OUTPATIENT
Start: 2022-06-05 | End: 2022-06-05

## 2022-06-05 RX ORDER — SODIUM CHLORIDE 9 MG/ML
INJECTION, SOLUTION INTRAVENOUS ONCE
Status: DISCONTINUED | OUTPATIENT
Start: 2022-06-05 | End: 2022-06-06

## 2022-06-05 RX ORDER — DEXTROSE AND SODIUM CHLORIDE 5; .45 G/100ML; G/100ML
INJECTION, SOLUTION INTRAVENOUS CONTINUOUS PRN
Status: DISCONTINUED | OUTPATIENT
Start: 2022-06-05 | End: 2022-06-05 | Stop reason: SDUPTHER

## 2022-06-05 RX ORDER — ENOXAPARIN SODIUM 100 MG/ML
30 INJECTION SUBCUTANEOUS DAILY
Status: DISCONTINUED | OUTPATIENT
Start: 2022-06-06 | End: 2022-06-07 | Stop reason: HOSPADM

## 2022-06-05 RX ORDER — MORPHINE SULFATE 4 MG/ML
4 INJECTION, SOLUTION INTRAMUSCULAR; INTRAVENOUS ONCE
Status: COMPLETED | OUTPATIENT
Start: 2022-06-05 | End: 2022-06-05

## 2022-06-05 RX ORDER — LANOLIN ALCOHOL/MO/W.PET/CERES
6 CREAM (GRAM) TOPICAL NIGHTLY PRN
Status: DISCONTINUED | OUTPATIENT
Start: 2022-06-05 | End: 2022-06-07 | Stop reason: HOSPADM

## 2022-06-05 RX ORDER — ONDANSETRON 2 MG/ML
4 INJECTION INTRAMUSCULAR; INTRAVENOUS ONCE
Status: COMPLETED | OUTPATIENT
Start: 2022-06-05 | End: 2022-06-05

## 2022-06-05 RX ORDER — MAGNESIUM SULFATE 1 G/100ML
1000 INJECTION INTRAVENOUS PRN
Status: DISCONTINUED | OUTPATIENT
Start: 2022-06-05 | End: 2022-06-06

## 2022-06-05 RX ADMIN — SODIUM CHLORIDE: 9 INJECTION, SOLUTION INTRAVENOUS at 15:59

## 2022-06-05 RX ADMIN — DEXTROSE AND SODIUM CHLORIDE: 5; 450 INJECTION, SOLUTION INTRAVENOUS at 23:27

## 2022-06-05 RX ADMIN — ONDANSETRON 4 MG: 2 INJECTION INTRAMUSCULAR; INTRAVENOUS at 17:00

## 2022-06-05 RX ADMIN — SODIUM CHLORIDE: 9 INJECTION, SOLUTION INTRAVENOUS at 19:11

## 2022-06-05 RX ADMIN — FAMOTIDINE 20 MG: 10 INJECTION INTRAVENOUS at 17:00

## 2022-06-05 RX ADMIN — MORPHINE SULFATE 4 MG: 4 INJECTION, SOLUTION INTRAMUSCULAR; INTRAVENOUS at 17:17

## 2022-06-05 RX ADMIN — SODIUM CHLORIDE 0.1 UNITS/KG/HR: 9 INJECTION, SOLUTION INTRAVENOUS at 19:19

## 2022-06-05 RX ADMIN — SODIUM CHLORIDE: 9 INJECTION, SOLUTION INTRAVENOUS at 17:01

## 2022-06-05 RX ADMIN — DEXTROSE AND SODIUM CHLORIDE: 5; 450 INJECTION, SOLUTION INTRAVENOUS at 23:25

## 2022-06-05 RX ADMIN — IOPAMIDOL 75 ML: 755 INJECTION, SOLUTION INTRAVENOUS at 18:00

## 2022-06-05 ASSESSMENT — ENCOUNTER SYMPTOMS
BACK PAIN: 0
COUGH: 0
SHORTNESS OF BREATH: 0
PHOTOPHOBIA: 0
RHINORRHEA: 0
ABDOMINAL PAIN: 1

## 2022-06-05 ASSESSMENT — PAIN SCALES - GENERAL
PAINLEVEL_OUTOF10: 10
PAINLEVEL_OUTOF10: 0
PAINLEVEL_OUTOF10: 8
PAINLEVEL_OUTOF10: 7

## 2022-06-05 ASSESSMENT — PAIN DESCRIPTION - LOCATION: LOCATION: BACK

## 2022-06-05 ASSESSMENT — PAIN DESCRIPTION - PAIN TYPE: TYPE: ACUTE PAIN

## 2022-06-05 ASSESSMENT — PAIN DESCRIPTION - ORIENTATION: ORIENTATION: MID

## 2022-06-05 ASSESSMENT — PAIN DESCRIPTION - DESCRIPTORS: DESCRIPTORS: DISCOMFORT

## 2022-06-05 ASSESSMENT — PAIN - FUNCTIONAL ASSESSMENT: PAIN_FUNCTIONAL_ASSESSMENT: 0-10

## 2022-06-05 NOTE — Clinical Note
Discharge Plan[de-identified] Other/[facility])   Telemetry/Cardiac Monitoring Required?: Yes

## 2022-06-05 NOTE — ED PROVIDER NOTES
Lisa Nguyen is a 27 y.o. female presenting to the ED for vomiting and abdominal pain, beginning 2 days ago. The complaint has been intermittent, moderate in severity, and worsened by nothing. Patient is a 24-year-old female with history of insulin-dependent diabetes. Patient is on Lantus and NovoLog. She did states she took her Lantus last night. She has been seen in the ER multiple times for pelvic inflammatory disease. She states she recently had UTI symptoms and was treated by her OB/GYN nurse practitioner with Dr. Sandeep Diallo. She states she had a pelvic exam and gonorrhea and Chlamydia cultures done at that time and she has not had sex since that exam.  Patient denies any fever or chills. Her blood sugars are just been high. She states she is been compliant with her insulin. Review of Systems:   Review of Systems   Constitutional: Negative for chills, fatigue and fever. HENT: Negative for congestion, postnasal drip and rhinorrhea. Eyes: Negative for photophobia and visual disturbance. Respiratory: Negative for cough and shortness of breath. Cardiovascular: Negative for chest pain, palpitations and leg swelling. Gastrointestinal: Positive for abdominal pain. Endocrine: Negative for polyphagia and polyuria. Genitourinary: Positive for pelvic pain and vaginal discharge. Negative for difficulty urinating and dysuria. Musculoskeletal: Negative for back pain and neck pain. Skin: Negative for pallor and rash. Allergic/Immunologic: Negative for food allergies and immunocompromised state. Neurological: Negative for dizziness and headaches. Hematological: Negative for adenopathy. Does not bruise/bleed easily. Psychiatric/Behavioral: Negative for agitation and behavioral problems.                  --------------------------------------------- PAST HISTORY ---------------------------------------------  Past Medical History:  has a past medical history of Bleeding at insertion site, Common femoral artery injury, right, initial encounter, Depressive disorder, Diabetic ketoacidosis ([facility] [address] DM type 1 (diabetes mellitus, type 1) ([facility] [address] Marijuana abuse, Non-compliance, and Trichimoniasis. Past Surgical History:  has a past surgical history that includes Abdomen surgery (N/A, 5/9/2019) and Bartholin gland cyst excision. Social History:  reports that she has never smoked. She has never used smokeless tobacco. She reports previous drug use. Drug: Marijuana Charlaine Brice). She reports that she does not drink alcohol. Family History: family history includes Diabetes in her maternal grandmother and paternal grandmother; Stroke in an other family member. The patients home medications have been reviewed. Allergies: Patient has no known allergies.     -------------------------------------------------- RESULTS -------------------------------------------------  All laboratory and radiology results have been personally reviewed by myself   LABS:  Results for orders placed or performed during the hospital encounter of 06/05/22   CBC with Auto Differential   Result Value Ref Range    WBC 9.8 4.5 - 11.5 E9/L    RBC 5.16 3.50 - 5.50 E12/L    Hemoglobin 14.2 11.5 - 15.5 g/dL    Hematocrit 43.6 34.0 - 48.0 %    MCV 84.5 80.0 - 99.9 fL    MCH 27.5 26.0 - 35.0 pg    MCHC 32.6 32.0 - 34.5 %    RDW 12.5 11.5 - 15.0 fL    Platelets 642 608 - 938 E9/L    MPV 11.3 7.0 - 12.0 fL    Neutrophils % 63.6 43.0 - 80.0 %    Immature Granulocytes % 0.2 0.0 - 5.0 %    Lymphocytes % 32.5 20.0 - 42.0 %    Monocytes % 3.0 2.0 - 12.0 %    Eosinophils % 0.1 0.0 - 6.0 %    Basophils % 0.6 0.0 - 2.0 %    Neutrophils Absolute 6.23 1.80 - 7.30 E9/L    Immature Granulocytes # 0.02 E9/L    Lymphocytes Absolute 3.18 1.50 - 4.00 E9/L    Monocytes Absolute 0.29 0.10 - 0.95 E9/L    Eosinophils Absolute 0.01 (L) 0.05 - 0.50 E9/L    Basophils Absolute 0.06 0.00 - 0.20 C5/F   Basic Metabolic Panel   Result Value Ref Range mg/dL   URINE DRUG SCREEN   Result Value Ref Range    Amphetamine Screen, Urine NOT DETECTED Negative <1000 ng/mL    Barbiturate Screen, Ur NOT DETECTED Negative < 200 ng/mL    Benzodiazepine Screen, Urine NOT DETECTED Negative < 200 ng/mL    Cannabinoid Scrn, Ur NOT DETECTED Negative < 50ng/mL    Cocaine Metabolite Screen, Urine NOT DETECTED Negative < 300 ng/mL    Opiate Scrn, Ur NOT DETECTED Negative < 300ng/mL    PCP Screen, Urine NOT DETECTED Negative < 25 ng/mL    Methadone Screen, Urine NOT DETECTED Negative <300 ng/mL    Oxycodone Urine NOT DETECTED Negative <100 ng/mL    FENTANYL SCREEN, URINE NOT DETECTED Negative <1 ng/mL    Drug Screen Comment: see below    POCT Glucose   Result Value Ref Range    Meter Glucose >500 (H) 74 - 99 mg/dL       RADIOLOGY:  Interpreted by Radiologist.  CT ABDOMEN PELVIS W IV CONTRAST Additional Contrast? None   Final Result   No acute process in abdomen or pelvis. US PELVIS COMPLETE   Final Result   Normal appearance of the uterus, endometrium, and bilateral ovaries. There   are no adnexal masses. Normal Doppler flow within the ovaries. US DUP ABD PEL RETRO SCROT COMPLETE   Final Result   Normal appearance of the uterus, endometrium, and bilateral ovaries. There   are no adnexal masses. Normal Doppler flow within the ovaries. ------------------------- NURSING NOTES AND VITALS REVIEWED ---------------------------   The nursing notes within the ED encounter and vital signs as below have been reviewed. BP 93/60   Pulse (!) 117   Temp 97.4 °F (36.3 °C) (Tympanic)   Resp 16   Ht 5' 1\" (1.549 m)   Wt 108 lb (49 kg)   SpO2 94%   BMI 20.41 kg/m²   Oxygen Saturation Interpretation: Normal      ---------------------------------------------------PHYSICAL EXAM--------------------------------------    Physical Exam  Vitals reviewed. Constitutional:       General: She is not in acute distress. Appearance: Normal appearance.  She is not toxic-appearing. HENT:      Head: Normocephalic and atraumatic. Right Ear: External ear normal.      Left Ear: External ear normal.      Nose: Nose normal. No congestion. Mouth/Throat:      Mouth: Mucous membranes are moist.      Pharynx: Oropharynx is clear. No posterior oropharyngeal erythema. Eyes:      Extraocular Movements: Extraocular movements intact. Pupils: Pupils are equal, round, and reactive to light. Cardiovascular:      Rate and Rhythm: Normal rate and regular rhythm. Pulses: Normal pulses. Heart sounds: No murmur heard. Pulmonary:      Effort: Pulmonary effort is normal.      Breath sounds: No wheezing or rhonchi. Chest:      Chest wall: No tenderness. Abdominal:      General: Bowel sounds are normal.      Tenderness: There is abdominal tenderness. There is no right CVA tenderness, left CVA tenderness or guarding. Musculoskeletal:         General: No swelling or deformity. Cervical back: Normal range of motion and neck supple. No muscular tenderness. Skin:     General: Skin is warm and dry. Capillary Refill: Capillary refill takes less than 2 seconds. Findings: No erythema. Neurological:      General: No focal deficit present. Mental Status: She is alert and oriented to person, place, and time. Sensory: No sensory deficit. Motor: No weakness.    Psychiatric:         Mood and Affect: Mood normal.               ------------------------------ ED COURSE/MEDICAL DECISION MAKING----------------------  Medications   0.9 % sodium chloride infusion (has no administration in time range)   ketorolac (TORADOL) injection 15 mg (30 mg IntraVENous Not Given 6/5/22 7894)   glucose chewable tablet 16 g (has no administration in time range)   dextrose bolus 10% 125 mL (has no administration in time range)     Or   dextrose bolus 10% 250 mL (has no administration in time range)   glucagon (rDNA) injection 1 mg (has no administration in time range)   dextrose 5 % solution (has no administration in time range)   insulin regular (HUMULIN R;NOVOLIN R) 100 Units in sodium chloride 0.9 % 100 mL infusion (has no administration in time range)   dextrose bolus 10% 125 mL (has no administration in time range)     Or   dextrose bolus 10% 250 mL (has no administration in time range)   potassium chloride 10 mEq/100 mL IVPB (Peripheral Line) (has no administration in time range)   magnesium sulfate 1000 mg in dextrose 5% 100 mL IVPB (has no administration in time range)   sodium phosphate 10 mmol in sodium chloride 0.9 % 250 mL IVPB (has no administration in time range)     Or   sodium phosphate 15 mmol in dextrose 5 % 250 mL IVPB (has no administration in time range)     Or   sodium phosphate 20 mmol in dextrose 5 % 500 mL IVPB (has no administration in time range)   0.9 % sodium chloride infusion (has no administration in time range)   dextrose 5 % and 0.45 % sodium chloride infusion (has no administration in time range)   0.9 % sodium chloride infusion ( IntraVENous New Bag 6/5/22 1701)   0.9 % sodium chloride infusion (0 mL/hr IntraVENous Stopped 6/5/22 1701)   ondansetron (ZOFRAN) injection 4 mg (4 mg IntraVENous Given 6/5/22 1700)   famotidine (PEPCID) 20 mg in sodium chloride (PF) 10 mL injection (20 mg IntraVENous Given 6/5/22 1700)   iopamidol (ISOVUE-370) 76 % injection 75 mL (75 mLs IntraVENous Given 6/5/22 1800)   morphine sulfate (PF) injection 4 mg (4 mg IntraVENous Given 6/5/22 1717)       ED COURSE:  ED Course as of 06/05/22 1905   [place] Jun 05, 2022 1815 Consultation with critical care Dr. Wilfrid Echevarria accepts to ICU. [SHERIF]   1905 D/w dr alexandre will have night team evaluate [SHERIF]      ED Course User Index  [SHERIF] Addison Shearer DO       Critical care:  Please note that the withdrawal or failure to initiate urgent interventions for this patient would likely result in a life threatening deterioration or permanent disability.       Accordingly this patient received 30 minutes of critical care time, excluding separately billable procedures. Medical Decision Making:    And found to be in DKA. Patient was given 3000 cc of IV fluids. Patient was started on insulin infusion. DKA protocol was ordered patient will be admitted to the intensive care unit. Counseling: The emergency provider has spoken with the patient and discussed todays results, in addition to providing specific details for the plan of care and counseling regarding the diagnosis and prognosis. Questions are answered at this time and they are agreeable with the plan.      --------------------------------- IMPRESSION AND DISPOSITION ---------------------------------    IMPRESSION  1. Diabetic ketoacidosis without coma associated with type 1 diabetes mellitus ([facility] [address]    2. Nausea and vomiting, intractability of vomiting not specified, unspecified vomiting type    3. Generalized abdominal pain        DISPOSITION  Disposition: Admit to CCU/ICU  Patient condition is fair      NOTE: This report was transcribed using voice recognition software.  Every effort was made to ensure accuracy; however, inadvertent computerized transcription errors may be present       Betty Russell DO  06/05/22 2798

## 2022-06-05 NOTE — H&P
Hospitalist History and Physical          Patient: Patricia Dasilva  : 1995  MRN: [id number]     Acct: [de-identified]    PCP: Keiko Guzman DO  Date of Admission: 2022  Date of Service: Pt seen/examined on 22  and Admitted to Inpatient with expected LOS greater than two midnights due to medical therapy. Hospital Problems           Last Modified POA    * (Principal) DKA, type 1, not at goal [facility]) 2022 Yes          Assessment and Plan:    30-year-old female with acute diabetic ketoacidosis with a known underlying history of type 1 diabetes and multiple previous hospitalization for the same he also has a recent history of a urinary tract infection and a past medical history of depression/anxiety. Acute diabetic ketoacidosis:  Triggered by recent urinary tract infection according to patient. Known long history of noncompliance with insulin treatment and frequent hospitalizations for DKA. Negative urine drug screen and negative urinalysis. Initiated on the acute DKA protocol with ICU admission, every hour Accu-Cheks, insulin GTT, every 4 hours electrolytes with replacement protocols, and aggressive IV fluid resuscitation. Suspend home insulin regimen for now. Endocrinology consultation as well as diabetic education consult. Abdominal pain:  Likely secondary to DKA. Urinalysis negative (patient reports recent UTI diagnosis). Abdominal and pelvic ultrasound 2022 with normal appearance of the uterus, endometrium and bilateral ovaries, no adnexal masses, normal Doppler flow within the ovaries. CT abdomen pelvis with IV contrast 2022 also with no acute intra-abdominal process. Continue symptomatic and supportive care with IV fluids, antiemetics, home dose naproxen and Tylenol as needed, and low-dose morphine for severe pain. DVT prophylaxis:  Subcu Lovenox.     Fluid/electrolyte/nutrition:  Fluids per DKA protocol, currently 0.9 normal saline at 250 cc an hour followed by D5 normal saline once blood sugars below 250. Replenish electrolytes as needed. N.p.o. except for ice chips and water sips.      =======================================================================      Chief Complaint: Abdominal pain and severely elevated blood sugars. History Of Present Illness:  Marquez Polanco is a 27 y.o. female with PMHx of type 1 diabetes mellitus with multiple hospitalizations for acute diabetic ketoacidosis with a known history of noncompliance with insulin treatment who presented to the emergency room here at [facility] complaining of about a 2-day history of progressively worsening abdominal pain, nausea, inability tolerate oral intake, and severely uncontrolled blood sugars. Patient stated that last week she visited her primary care physician's office where she was diagnosed with a urinary tract infection and was prescribed ciprofloxacin. She was only able to start taking the antibiotic about 2 days ago which caused her nausea and abdominal pain to get worse she says. Her abdominal discomfort continued to get worse despite taking her insulin she says and despite taking the antibiotics. She was finally unable to bear the pain she says and her blood sugars were starting to read high and that is when she decided to come into the hospital for evaluation. She does admit to a long history of noncompliance with her insulin mostly due to getting overwhelmed with all the insulin treatment and deciding not to take her insulin on purpose. However, she states that this time was different and her blood sugars were uncontrolled despite taking her insulin at home. In the emergency room the patient's blood pressure was 93/60 with a pulse of 117 respiratory rate 16 and temperature of 97.4.   Laboratory investigations confirmed a state of acute diabetic ketoacidosis with a serum bicarb of 9 and a serum glucose of 644 and an elevated beta hydroxybutyrate of more than 4.5. Patient received 3 L of IV fluid resuscitation and initiated on an insulin drip per the acute DKA protocol. At the time of my evaluation in the emergency room, patient was laying in bed in mild discomfort from abdominal pain but no acute respiratory distress. She was able to answer all questions appropriately and participate in all aspects of history and physical exam.  She repeatedly was asking for pain medications to calm her abdominal pain. Past Medical History:        Diagnosis Date    Bleeding at insertion site 01/05/2018    Common femoral artery injury, right, initial encounter 01/05/2018    Depressive disorder     Diabetic ketoacidosis ([facility] [address]     DM type 1 (diabetes mellitus, type 1) ([facility] [address]     Marijuana abuse     Non-compliance     Trichimoniasis 11/19/2021       Past Surgical History:        Procedure Laterality Date    ABDOMEN SURGERY N/A 5/9/2019    INCISION AND DRAINAGE OF SUPRAPUBIC ABSESS performed by Laurie Angeles MD at [address] [place] [address]      last yr       Medications Prior to Admission:   Prior to Admission medications    Medication Sig Start Date End Date Taking? Authorizing Provider   insulin aspart (NOVOLOG FLEXPEN) 100 UNIT/ML injection pen Inject 3 times daily before meals, per sliding scale 5/2/22   Terrishakeel Graves DO   Insulin Pen Needle (BD PEN NEEDLE SHELBIE U/F) 32G X 4 MM MISC Uses with insulin 4 times a day 5/2/22   Terri Graves DO   insulin glargine (LANTUS SOLOSTAR) 100 UNIT/ML injection pen Inject 22 Units into the skin every morning 5/2/22   Terri Graves DO   insulin lispro, 1 Unit Dial, (HUMALOG KWIKPEN) 100 UNIT/ML SOPN Inject 1 units per 10 grams of carbs + following sliding scale.  -200 add 2U, -250 add 4U, -300 add 6U, -350 add 8U, -400 add 12U, BS over 400 add 12U 4/26/22   Ebonie Foster MD   naproxen (NAPROSYN) 500 MG tablet Take 1 tablet by mouth 2 times daily as needed for Pain 3/29/22   Sherren Haff, MD   melatonin (RA MELATONIN) 3 MG TABS tablet Take 2 tablets by mouth daily 1/11/22   Vivienimer KunzDO   acetaminophen (TYLENOL) 500 MG tablet Take 1 tablet by mouth every 4 hours as needed for Pain or Fever 12/22/21 1/1/22  Tyrel Pemberton DO       Allergies:  Patient has no known allergies. Social History:    The patient currently lives independently. Tobacco use:   reports that she has never smoked. She has never used smokeless tobacco.  Alcohol use:   reports no history of alcohol use. Drug use:  reports previous drug use. Drug: Marijuana Lindsaymónica Melton). Family History:   as follows:      Problem Relation Age of Onset    Diabetes Maternal Grandmother     Diabetes Paternal Grandmother     Stroke Other     Thyroid Disease Neg Hx        Review of Systems:   Pertinent positives and negatives as noted in the HPI. Otherwise complete ROS negative. Physical Exam:    /61   Pulse (!) 117   Temp 97.1 °F (36.2 °C) (Oral)   Resp 16   Ht 5' 1\" (1.549 m)   Wt 108 lb (49 kg)   SpO2 98%   BMI 20.41 kg/m²       General appearance: No apparent distress, appears stated age. Eyes:  Pupils equal, round, and reactive to light. Conjunctivae/corneas clear. HENT: Head normal in appearance. External nares normal.  Oral mucosa moist without lesions. Hearing grossly intact. Neck: Supple, with full range of motion. Trachea midline. No gross JVD appreciated. Respiratory:  Normal respiratory effort. Clear to auscultation, bilaterally without rales or wheezes or rhonchi. Cardiovascular: Normal rate, regular rhythm with normal S1/S2 without murmurs. No lower extremity edema. Abdomen: Diffuse abdominal tenderness to palpation without rebound, non-distended with normal bowel sounds. Musculoskeletal: No joint swelling or tenderness. Normal tone. No abnormal movements. Skin: Warm and dry. No rashes or lesions.   Neurologic:  No focal sensory/motor deficits in the upper and lower extremities. Cranial nerves:  grossly non-focal 2-12. Psychiatric: Alert and oriented, normal insight and thought content. Capillary Refill: Brisk,< 3 seconds. Peripheral Pulses: +2 palpable, equal bilaterally. Labs:     Recent Labs     06/05/22  1532   WBC 9.8   HGB 14.2   HCT 43.6        Recent Labs     06/05/22  1532   *   K 5.2*   CL 89*   CO2 9*   BUN 21*   CREATININE 0.9   CALCIUM 11.2*     Recent Labs     06/05/22  1532   AST 24   ALT 26   BILIDIR <0.2   BILITOT 0.5   ALKPHOS 115*     No results for input(s): INR in the last 72 hours. No results for input(s): Hailee Mana in the last 72 hours. Lab Results   Component Value Date    NITRU Negative 06/05/2022    WBCUA NONE 06/05/2022    BACTERIA NONE SEEN 06/05/2022    RBCUA NONE 06/05/2022    BLOODU Negative 06/05/2022    SPECGRAV 1.025 06/05/2022    GLUCOSEU >=1000 06/05/2022         Radiology:     CT ABDOMEN PELVIS W IV CONTRAST Additional Contrast? None   Final Result   No acute process in abdomen or pelvis. US PELVIS COMPLETE   Final Result   Normal appearance of the uterus, endometrium, and bilateral ovaries. There   are no adnexal masses. Normal Doppler flow within the ovaries. US DUP ABD PEL RETRO SCROT COMPLETE   Final Result   Normal appearance of the uterus, endometrium, and bilateral ovaries. There   are no adnexal masses. Normal Doppler flow within the ovaries. XR CHEST PORTABLE    (Results Pending)        Diet: Diet NPO except for ice chips. DVT prophylaxis: Subcu Lovenox. Code Status: Prior  Disposition: admit to inpatient, medical ICU, telemetry. Thank you Dora Villalta DO for the opportunity to be involved in this patient's care.     Electronically signed by Cory Maurer MD on 6/5/2022 at 11:23 PM.

## 2022-06-05 NOTE — ED NOTES
Pt states that's he feels like she is in dka again, she states that's he started vomitting last night and it has not gotten any better, she states that she just feels so weak, she is alert times 3, resp easy, lungs are clear a and p, abdomen is soft with bowel sounds times 4, 0 edema, pedal pulses plus 2 bilateral     Ty Marisol, PAGE  06/05/22 4093

## 2022-06-06 LAB
ANION GAP SERPL CALCULATED.3IONS-SCNC: 14 MMOL/L (ref 7–16)
ANION GAP SERPL CALCULATED.3IONS-SCNC: 16 MMOL/L (ref 7–16)
ANION GAP SERPL CALCULATED.3IONS-SCNC: 21 MMOL/L (ref 7–16)
BASOPHILS ABSOLUTE: 0.06 E9/L (ref 0–0.2)
BASOPHILS RELATIVE PERCENT: 0.5 % (ref 0–2)
BUN BLDV-MCNC: 11 MG/DL (ref 6–20)
BUN BLDV-MCNC: 14 MG/DL (ref 6–20)
BUN BLDV-MCNC: 17 MG/DL (ref 6–20)
CALCIUM SERPL-MCNC: 8.8 MG/DL (ref 8.6–10.2)
CALCIUM SERPL-MCNC: 8.9 MG/DL (ref 8.6–10.2)
CALCIUM SERPL-MCNC: 9.5 MG/DL (ref 8.6–10.2)
CHLORIDE BLD-SCNC: 104 MMOL/L (ref 98–107)
CHLORIDE BLD-SCNC: 105 MMOL/L (ref 98–107)
CHLORIDE BLD-SCNC: 105 MMOL/L (ref 98–107)
CO2: 10 MMOL/L (ref 22–29)
CO2: 13 MMOL/L (ref 22–29)
CO2: 14 MMOL/L (ref 22–29)
CREAT SERPL-MCNC: 0.5 MG/DL (ref 0.5–1)
CREAT SERPL-MCNC: 0.6 MG/DL (ref 0.5–1)
CREAT SERPL-MCNC: 0.6 MG/DL (ref 0.5–1)
EOSINOPHILS ABSOLUTE: 0.01 E9/L (ref 0.05–0.5)
EOSINOPHILS RELATIVE PERCENT: 0.1 % (ref 0–6)
GFR AFRICAN AMERICAN: >60
GFR NON-AFRICAN AMERICAN: >60 ML/MIN/1.73
GLUCOSE BLD-MCNC: 153 MG/DL (ref 74–99)
GLUCOSE BLD-MCNC: 153 MG/DL (ref 74–99)
GLUCOSE BLD-MCNC: 196 MG/DL (ref 74–99)
HBA1C MFR BLD: 14.5 % (ref 4–5.6)
HCT VFR BLD CALC: 35.5 % (ref 34–48)
HEMOGLOBIN: 11.8 G/DL (ref 11.5–15.5)
IMMATURE GRANULOCYTES #: 0.06 E9/L
IMMATURE GRANULOCYTES %: 0.5 % (ref 0–5)
LYMPHOCYTES ABSOLUTE: 3.17 E9/L (ref 1.5–4)
LYMPHOCYTES RELATIVE PERCENT: 25.4 % (ref 20–42)
MAGNESIUM: 1.6 MG/DL (ref 1.6–2.6)
MAGNESIUM: 1.7 MG/DL (ref 1.6–2.6)
MAGNESIUM: 1.8 MG/DL (ref 1.6–2.6)
MCH RBC QN AUTO: 27.3 PG (ref 26–35)
MCHC RBC AUTO-ENTMCNC: 33.2 % (ref 32–34.5)
MCV RBC AUTO: 82.2 FL (ref 80–99.9)
METER GLUCOSE: 123 MG/DL (ref 74–99)
METER GLUCOSE: 136 MG/DL (ref 74–99)
METER GLUCOSE: 137 MG/DL (ref 74–99)
METER GLUCOSE: 139 MG/DL (ref 74–99)
METER GLUCOSE: 152 MG/DL (ref 74–99)
METER GLUCOSE: 166 MG/DL (ref 74–99)
METER GLUCOSE: 168 MG/DL (ref 74–99)
METER GLUCOSE: 182 MG/DL (ref 74–99)
METER GLUCOSE: 184 MG/DL (ref 74–99)
METER GLUCOSE: 185 MG/DL (ref 74–99)
METER GLUCOSE: 208 MG/DL (ref 74–99)
METER GLUCOSE: 219 MG/DL (ref 74–99)
METER GLUCOSE: 239 MG/DL (ref 74–99)
MONOCYTES ABSOLUTE: 0.79 E9/L (ref 0.1–0.95)
MONOCYTES RELATIVE PERCENT: 6.3 % (ref 2–12)
NEUTROPHILS ABSOLUTE: 8.37 E9/L (ref 1.8–7.3)
NEUTROPHILS RELATIVE PERCENT: 67.2 % (ref 43–80)
PDW BLD-RTO: 12.5 FL (ref 11.5–15)
PHOSPHORUS: 2.8 MG/DL (ref 2.5–4.5)
PHOSPHORUS: 3.5 MG/DL (ref 2.5–4.5)
PHOSPHORUS: 4.1 MG/DL (ref 2.5–4.5)
PLATELET # BLD: 329 E9/L (ref 130–450)
PMV BLD AUTO: 10.9 FL (ref 7–12)
POTASSIUM SERPL-SCNC: 4 MMOL/L (ref 3.5–5)
POTASSIUM SERPL-SCNC: 4.8 MMOL/L (ref 3.5–5)
POTASSIUM SERPL-SCNC: 4.9 MMOL/L (ref 3.5–5)
RBC # BLD: 4.32 E12/L (ref 3.5–5.5)
SODIUM BLD-SCNC: 132 MMOL/L (ref 132–146)
SODIUM BLD-SCNC: 134 MMOL/L (ref 132–146)
SODIUM BLD-SCNC: 136 MMOL/L (ref 132–146)
WBC # BLD: 12.5 E9/L (ref 4.5–11.5)

## 2022-06-06 PROCEDURE — 85025 COMPLETE CBC W/AUTO DIFF WBC: CPT

## 2022-06-06 PROCEDURE — 2580000003 HC RX 258: Performed by: PEDIATRICS

## 2022-06-06 PROCEDURE — 36415 COLL VENOUS BLD VENIPUNCTURE: CPT

## 2022-06-06 PROCEDURE — 6360000002 HC RX W HCPCS: Performed by: STUDENT IN AN ORGANIZED HEALTH CARE EDUCATION/TRAINING PROGRAM

## 2022-06-06 PROCEDURE — 6370000000 HC RX 637 (ALT 250 FOR IP): Performed by: STUDENT IN AN ORGANIZED HEALTH CARE EDUCATION/TRAINING PROGRAM

## 2022-06-06 PROCEDURE — 6370000000 HC RX 637 (ALT 250 FOR IP): Performed by: PEDIATRICS

## 2022-06-06 PROCEDURE — 1200000000 HC SEMI PRIVATE

## 2022-06-06 PROCEDURE — 99232 SBSQ HOSP IP/OBS MODERATE 35: CPT | Performed by: FAMILY MEDICINE

## 2022-06-06 PROCEDURE — 83036 HEMOGLOBIN GLYCOSYLATED A1C: CPT

## 2022-06-06 PROCEDURE — 36592 COLLECT BLOOD FROM PICC: CPT

## 2022-06-06 PROCEDURE — 82962 GLUCOSE BLOOD TEST: CPT

## 2022-06-06 PROCEDURE — 36556 INSERT NON-TUNNEL CV CATH: CPT

## 2022-06-06 PROCEDURE — 84100 ASSAY OF PHOSPHORUS: CPT

## 2022-06-06 PROCEDURE — 87081 CULTURE SCREEN ONLY: CPT

## 2022-06-06 PROCEDURE — 83735 ASSAY OF MAGNESIUM: CPT

## 2022-06-06 PROCEDURE — 6370000000 HC RX 637 (ALT 250 FOR IP): Performed by: INTERNAL MEDICINE

## 2022-06-06 PROCEDURE — 80048 BASIC METABOLIC PNL TOTAL CA: CPT

## 2022-06-06 PROCEDURE — 6360000002 HC RX W HCPCS: Performed by: PEDIATRICS

## 2022-06-06 PROCEDURE — 6360000002 HC RX W HCPCS: Performed by: INTERNAL MEDICINE

## 2022-06-06 PROCEDURE — 99222 1ST HOSP IP/OBS MODERATE 55: CPT | Performed by: INTERNAL MEDICINE

## 2022-06-06 RX ORDER — INSULIN LISPRO 100 [IU]/ML
0-6 INJECTION, SOLUTION INTRAVENOUS; SUBCUTANEOUS NIGHTLY
Status: DISCONTINUED | OUTPATIENT
Start: 2022-06-06 | End: 2022-06-07 | Stop reason: HOSPADM

## 2022-06-06 RX ORDER — HEPARIN SODIUM (PORCINE) LOCK FLUSH IV SOLN 100 UNIT/ML 100 UNIT/ML
3 SOLUTION INTRAVENOUS PRN
Status: DISCONTINUED | OUTPATIENT
Start: 2022-06-06 | End: 2022-06-07 | Stop reason: HOSPADM

## 2022-06-06 RX ORDER — CEPHALEXIN 500 MG/1
500 CAPSULE ORAL EVERY 8 HOURS SCHEDULED
Status: DISCONTINUED | OUTPATIENT
Start: 2022-06-06 | End: 2022-06-07 | Stop reason: HOSPADM

## 2022-06-06 RX ORDER — INSULIN GLARGINE 100 [IU]/ML
22 INJECTION, SOLUTION SUBCUTANEOUS DAILY
Status: DISCONTINUED | OUTPATIENT
Start: 2022-06-06 | End: 2022-06-07 | Stop reason: HOSPADM

## 2022-06-06 RX ORDER — INSULIN LISPRO 100 [IU]/ML
0-12 INJECTION, SOLUTION INTRAVENOUS; SUBCUTANEOUS
Status: DISCONTINUED | OUTPATIENT
Start: 2022-06-06 | End: 2022-06-07 | Stop reason: HOSPADM

## 2022-06-06 RX ORDER — KETOROLAC TROMETHAMINE 30 MG/ML
15 INJECTION, SOLUTION INTRAMUSCULAR; INTRAVENOUS ONCE
Status: COMPLETED | OUTPATIENT
Start: 2022-06-06 | End: 2022-06-06

## 2022-06-06 RX ORDER — HEPARIN SODIUM (PORCINE) LOCK FLUSH IV SOLN 100 UNIT/ML 100 UNIT/ML
3 SOLUTION INTRAVENOUS EVERY 12 HOURS
Status: DISCONTINUED | OUTPATIENT
Start: 2022-06-06 | End: 2022-06-07 | Stop reason: HOSPADM

## 2022-06-06 RX ORDER — DIPHENHYDRAMINE HYDROCHLORIDE 50 MG/ML
25 INJECTION INTRAMUSCULAR; INTRAVENOUS ONCE
Status: COMPLETED | OUTPATIENT
Start: 2022-06-06 | End: 2022-06-06

## 2022-06-06 RX ORDER — FAMOTIDINE 20 MG/1
20 TABLET, FILM COATED ORAL DAILY
Status: DISCONTINUED | OUTPATIENT
Start: 2022-06-06 | End: 2022-06-07 | Stop reason: HOSPADM

## 2022-06-06 RX ORDER — PROCHLORPERAZINE EDISYLATE 5 MG/ML
10 INJECTION INTRAMUSCULAR; INTRAVENOUS EVERY 6 HOURS PRN
Status: DISCONTINUED | OUTPATIENT
Start: 2022-06-06 | End: 2022-06-07 | Stop reason: HOSPADM

## 2022-06-06 RX ADMIN — INSULIN LISPRO 4 UNITS: 100 INJECTION, SOLUTION INTRAVENOUS; SUBCUTANEOUS at 17:13

## 2022-06-06 RX ADMIN — POTASSIUM CHLORIDE 10 MEQ: 7.46 INJECTION, SOLUTION INTRAVENOUS at 03:05

## 2022-06-06 RX ADMIN — POTASSIUM CHLORIDE 10 MEQ: 7.46 INJECTION, SOLUTION INTRAVENOUS at 02:08

## 2022-06-06 RX ADMIN — INSULIN GLARGINE 22 UNITS: 100 INJECTION, SOLUTION SUBCUTANEOUS at 10:52

## 2022-06-06 RX ADMIN — POTASSIUM CHLORIDE 10 MEQ: 7.46 INJECTION, SOLUTION INTRAVENOUS at 00:30

## 2022-06-06 RX ADMIN — FAMOTIDINE 20 MG: 20 TABLET ORAL at 10:52

## 2022-06-06 RX ADMIN — DEXTROSE AND SODIUM CHLORIDE: 5; 450 INJECTION, SOLUTION INTRAVENOUS at 06:31

## 2022-06-06 RX ADMIN — ENOXAPARIN SODIUM 30 MG: 100 INJECTION SUBCUTANEOUS at 09:03

## 2022-06-06 RX ADMIN — POTASSIUM CHLORIDE 10 MEQ: 7.46 INJECTION, SOLUTION INTRAVENOUS at 05:45

## 2022-06-06 RX ADMIN — POTASSIUM CHLORIDE 10 MEQ: 7.46 INJECTION, SOLUTION INTRAVENOUS at 04:43

## 2022-06-06 RX ADMIN — CEPHALEXIN 500 MG: 500 CAPSULE ORAL at 14:10

## 2022-06-06 RX ADMIN — KETOROLAC TROMETHAMINE 15 MG: 30 INJECTION, SOLUTION INTRAMUSCULAR; INTRAVENOUS at 07:54

## 2022-06-06 RX ADMIN — HEPARIN 300 UNITS: 100 SYRINGE at 20:47

## 2022-06-06 RX ADMIN — CEPHALEXIN 500 MG: 500 CAPSULE ORAL at 20:47

## 2022-06-06 RX ADMIN — SODIUM CHLORIDE 3.66 UNITS/HR: 9 INJECTION, SOLUTION INTRAVENOUS at 00:34

## 2022-06-06 RX ADMIN — NAPROXEN 500 MG: 500 TABLET ORAL at 17:10

## 2022-06-06 RX ADMIN — DIPHENHYDRAMINE HYDROCHLORIDE 25 MG: 50 INJECTION, SOLUTION INTRAMUSCULAR; INTRAVENOUS at 07:54

## 2022-06-06 RX ADMIN — INSULIN LISPRO 2 UNITS: 100 INJECTION, SOLUTION INTRAVENOUS; SUBCUTANEOUS at 20:49

## 2022-06-06 ASSESSMENT — PAIN SCALES - GENERAL
PAINLEVEL_OUTOF10: 0

## 2022-06-06 NOTE — PROGRESS NOTES
[facility] Progress Note    Admitting Date and Time: 6/5/2022  3:25 PM  Admit Dx: Generalized abdominal pain [R10.84]  DKA, type 1, not at goal [facility]) [E10.10]  Diabetic ketoacidosis without coma associated with type 1 diabetes mellitus ([facility] [E10.10]  Nausea and vomiting, intractability of vomiting not specified, unspecified vomiting type [R11.2]    Subjective:  Patient is being followed for Generalized abdominal pain [R10.84]  DKA, type 1, not at goal [facility]) [E10.10]  Diabetic ketoacidosis without coma associated with type 1 diabetes mellitus ([facility] [E10.10]  Nausea and vomiting, intractability of vomiting not specified, unspecified vomiting type [R11.2]     Pt feeling back pain, lower back, that happens when she goes into DKA, starting up after she had lunch. No events overnight. No fevers. No N/V or abd pain after eating lunch. No cough/wheezing/SOB. Per RN: nothing to report    ROS: denies fever, chills, cp, sob, n/v, HA unless stated above.       cephALEXin  500 mg Oral 3 times per day    heparin flush  3 mL IntraVENous Q12H    insulin glargine  22 Units SubCUTAneous Daily    insulin lispro  0-12 Units SubCUTAneous TID WC    insulin lispro  0-6 Units SubCUTAneous Nightly    famotidine  20 mg Oral Daily    enoxaparin  30 mg SubCUTAneous Daily     prochlorperazine, 10 mg, Q6H PRN  heparin flush, 3 mL, PRN  glucose, 4 tablet, PRN  glucagon (rDNA), 1 mg, PRN  dextrose, 100 mL/hr, PRN  melatonin, 6 mg, Nightly PRN  naproxen, 500 mg, BID PRN  dextrose bolus, 125 mL, PRN   Or  dextrose bolus, 250 mL, PRN  polyethylene glycol, 17 g, Daily PRN         Objective:    /79   Pulse 98   Temp 98.4 °F (36.9 °C) (Infrared)   Resp 11   Ht 5' 1\" (1.549 m)   Wt 103 lb 6.3 oz (46.9 kg)   SpO2 98%   BMI 19.54 kg/m²     General Appearance: alert and oriented to person, place and time and in some distress shaking her legs  Skin: warm and dry, good turgor, no rashes, no jaundice  Head: normocephalic and atraumatic  Eyes: pupils equal, round, and reactive to light, extraocular eye movements intact, conjunctivae normal  Neck: neck supple and non tender without mass   Pulmonary/Chest: clear to auscultation bilaterally- no wheezes, rales or rhonchi, normal air movement, no respiratory distress  Cardiovascular: normal rate, normal S1 and S2 and no carotid bruits  Abdomen: soft, non-tender, non-distended, normal bowel sounds, no masses or organomegaly  Extremities: no cyanosis, no clubbing and no edema  Neurologic: no cranial nerve deficit and speech normal        Recent Labs     06/05/22  2345 06/06/22  0305 06/06/22  0730    134 132   K 4.0 4.9 4.8    105 104   CO2 10* 13* 14*   BUN 17 14 11   CREATININE 0.6 0.6 0.5   GLUCOSE 153* 153* 196*   CALCIUM 9.5 8.9 8.8       Recent Labs     06/05/22  1532 06/06/22  0305   WBC 9.8 12.5*   RBC 5.16 4.32   HGB 14.2 11.8   HCT 43.6 35.5   MCV 84.5 82.2   MCH 27.5 27.3   MCHC 32.6 33.2   RDW 12.5 12.5    329   MPV 11.3 10.9       Radiology:   None new    Assessment:    Principal Problem:    DKA, type 1, not at goal [facility])  Resolved Problems:    * No resolved hospital problems. *      Plan:  1. DKA -- improved, gap closed  -Continue to monitor in ICU  -Diabetic diet ordered -- ate lunch and tolerated it so far  -Sugars TID AC and HS  -Lantus and SS insulin -- adjust as needed  -Monitor bicarb level, electrolytes, phosphorus normal  -Likely can transfer out of ICU soon    2. Recent UTI -- pt believes this set off her DKA  -UA in ER was clear of infection yesterday    Disp: likely discharge tomorro wto home without services  DVT Prophylaxis: Lovenox 30 mg subQ daily  CODE status: FULL    NOTE: This report was transcribed using voice recognition software. Every effort was made to ensure accuracy; however, inadvertent computerized transcription errors may be present.     Electronically signed by Freida Hernandez DO on 6/6/2022 at 2:41 PM

## 2022-06-06 NOTE — PROGRESS NOTES
Reason for consult: Uncontrolled type 1 DM    A1C: 14.5%     [] Not available                     Patient states the following concerns/barriers to diabetes self-management:     [] None       [] Medication cost   [] Food cost/availability        [] Reading  [] Hearing   [] Vision                [] Work    [] Transportation  [] No insurance  [] Physical limitations    [x] Other: poor appetite       Diabetes survival packet provided to:   [x] Patient     [] Other:    Information reviewed:   Definition of diabetes   Target glucose ranges/A1C   Self-monitoring of blood glucose   Prevention/symptoms/treatment of hypo-/hyperglycemia   Medication adherence   The plate method/meal planning guidelines   The benefits of exercise and recommendations   Reducing the risk of chronic complications      Diabetes medications reviewed (use, purpose, action): Long and rapid-acting insulin. Post-education Assessment  [x]  Attentive to teaching  [x]  Answered questions appropriately when asked   []  Seems able to apply concepts to daily lifestyle  [x]  Seems motivated to do well  [x]  Verbalized an understanding of prescribed antidiabetes medications   [x]  Verbalized an understanding of target glucose ranges/A1C level  [x]  Expresses an intent to comply with treatment plan   []  Showed very little interest in complying with treatment plan   []  Seems to have trouble applying concepts to daily lifestyle       COMMENTS: Patient known to me from previous admissions with DKA. Patient receptive to meeting with me, complaining of back pain but no abdominal pain at this time. States that morphine is the only thing that helps with the back pain. States that she has still had a poor appetite at home, but eating twice per day. Only checks her blood sugar when she eats or doesn't feel well. States they were running 300-400s recently. Taking Lantus 22 units daily and Novolog with a sliding scale before meals.   Per Dr. Samara Maxwell last office visit orders, it looks like she should be on a ICR of 1:10, but patient denies this. She states she is compliant with her insulin, but it is likely that she is not taking as prescribed. She had an insulin pump in the past but did not like it. Also has a CGM at home, but does not wear. States that she does not like to be \"hooked up\" to anything. She believes recent UTI could have contributed to DKA, but A1C of 14.5% concerning and most likely d/t correct insulin regimen not being followed/poor diet. Again encouraged outpatient diabetes education and MNT for carb-counting. Recommendations:   [x] Carbohydrate-controlled diet    [] Script for glucometer and supplies (per preference of patient's insurance)  [] Script for insulin pens and pen needles (if insulin is ordered at discharge for home use)   [] Consult to social work: patient has no insurance or has financial hardship  [x] Inpatient consult to endocrinologist   [x] Follow up with endocrinologist as an outpatient   [] Home healthcare nursing to increase compliance to treatment plan   [x] Script for outpatient diabetes education classes (from doctor)    20 minutes was spend providing consultation/education. Thank you for this consult.     Jameel Saul, RN, BSN, [place]

## 2022-06-06 NOTE — CARE COORDINATION
Pt admit ICU for DKA. Insulin drip stopped today, sliding scale. Pt lives with grandmother. Independent prior to admit. Has glucometer and insulin. Follows with Dr Hira Dubois and Dr Kassie Shepherd. Plan is home with no needs. Endo consult and diabetic educator to follow-mjo    The Plan for Transition of Care is related to the following treatment goals: home    The Patient and/or patient representative pt was provided with a choice of provider and agrees   with the discharge plan. [x] Yes [] No    Freedom of choice list was provided with basic dialogue that supports the patient's individualized plan of care/goals, treatment preferences and shares the quality data associated with the providers.  [x] Yes [] No

## 2022-06-06 NOTE — PROCEDURES
Central Line Placement Procedure Note    Indication: vascular access, poor peripheral access, long term access and need for frequent blood draws    Consent: The patient was counseled regarding the procedure, its indications, risks, potential complications and alternatives, and any questions were answered. Consent was obtained to proceed. Procedure: The patient was positioned appropriately and the skin over the left internal jugular vein was prepped with Chloraprep and draped in a sterile fashion. Local anesthesia was obtained by infiltration using 1% Lidocaine without epinephrine. A large bore needle was used to identify the vein under US guidance. A guide wire was then inserted into the vein through the needle. A triple lumen catheter was then inserted into the vessel over the guide wire using the Seldinger technique. All ports showed good, free flowing blood return and were flushed with saline solution. The catheter was then securely fastened to the skin with suture at 18 cm. Two sutures were placed into the kit included tube clamp and proximal eyelets. An antibiotic disk was placed and the site was then covered with a sterile dressing. A post procedure X-ray was ordered and is still pending at this time. The patient tolerated the procedure well. Complications: None    Signed Mayuri Gerber D.O., PGY-2. 6/5/2022 11:05 PM    Procedure was performed under direct observation for Dr. Zia Pineda.

## 2022-06-06 NOTE — PATIENT CARE CONFERENCE
Intensive Care Daily Quality Rounding Checklist      ICU Team Members: Dr. Heide Junior, Keshia Medel (residents), clinical pharmacist & clinical pharmacist resident, charge nurse, bedside nurse    ICU Day #: NUMBER: 2    Intubation Date: N/A     Ventilator Day #: N/A    Central Line Insertion Date:  June 5, 2022        Day #: NUMBER: 2     Arterial Line Insertion Date: N/A       Day #: N/A    Temporary Hemodialysis Catheter Insertion Date:  N/A      Day # N/A    DVT Prophylaxis: Lovenox    GI Prophylaxis: Pepcid    Bey Catheter Insertion Date: N/A        Day #: N/A      Continued need (if yes, reason documented and discussed with physician): no    Skin Issues/ Wounds and ordered treatment discussed on rounds: NO SKIN ISSUES    Goals/ Plans for the Day: Daily labs, DKA protocol-replace electrolytes, up ad jeremi, serial BMPs

## 2022-06-06 NOTE — PROGRESS NOTES
Patient admitted from ER 10 to 205, with the following belongings cell phone, shoes, socks, purse, clothes, placed on monitor, patient oriented to room and unit visiting hours. Patient guide at bedside, reviewed patient rights and responsibilities. MRSA nasal swab obtained. Bed alarm on. Call light within reach.

## 2022-06-06 NOTE — PLAN OF CARE
Problem: Discharge Planning  Goal: Discharge to home or other facility with appropriate resources  Recent Flowsheet Documentation  Taken 6/6/2022 0007 by Kailee Etienne RN  Discharge to home or other facility with appropriate resources: Identify barriers to discharge with patient and caregiver

## 2022-06-06 NOTE — CONSULTS
Critical Care Admit/Consult Note         Patient - Hakeem Jones   MRN -  [id number]   [place] # - [de-identified]   - 1995      Date of Admission -  2022  3:25 PM  Date of evaluation -  2022  0205/0205-A   Hospital Day - 1            ADMIT/CONSULT DETAILS     Reason for Admit/Consult   Diabetic Ketoacidosis    Consulting Service/Physician   Consulting - Maia Trujillo DO  Primary Care Physician - DO TRINI Caba   The patient is a 27 y.o. female with significant past medical history of type 1 diabetes, MDD, previous PID, presented to ED yesterday for nausea vomiting and abdominal pain x2 days. Patient is on Lantus and NovoLog. Patient states she recently had UTI which she was treated by her OB/GYN, and she did have STD checks including gonorrhea chlamydia which were negative. .  Patient states her blood sugars have been high although she has been compliant with her insulin. Her complaint is constant, moderate in severity, no exacerbating relieving factors. In the ED, patient was found to be in DKA, given 3 L IV fluids and started on insulin infusion. DKA protocol was initiated. This morning, patient was feeling improved from yesterday. She only complains of headache and low back pain which she states started last night and has been continuous, moderate in severity, no exacerbating relieving factors. She denies any abdominal pain, nausea, urinary discomfort, vaginal pain or discharge. Denies any episodes of vomiting last night. Patient is hemodynamically stable, mildly tachycardic at 106, other vitals within normal limits. Pt has been receiving IV fluids, Insulin regular, KCl. Toradol, benadry, compazine ordered for her HA and low back pain. Labwork improved from yesterday. Electrolytes stable. Normal Gap  Plan to reassess BMP this morning. Plan to transition to home diabetic meds, diabetic diet  Remove central like if able.    Protonix ordered for GI prophylaxis.             Past Medical History         Diagnosis Date    Bleeding at insertion site 01/05/2018    Common femoral artery injury, right, initial encounter 01/05/2018    Depressive disorder     [facility]     DM type 1 (diabetes mellitus, type 1) ([facility]     Marijuana abuse     Non-compliance     Trichimoniasis 11/19/2021        Past Surgical History           Procedure Laterality Date    ABDOMEN SURGERY N/A 5/9/2019    INCISION AND DRAINAGE OF SUPRAPUBIC ABSESS performed by Linda Barry MD at [address] [place] [address]      last yr       Current Medications   Current Medications    diphenhydrAMINE  25 mg IntraVENous Once    ketorolac  15 mg IntraVENous Once    cephALEXin  500 mg Oral 3 times per day    heparin flush  3 mL IntraVENous Q12H    insulin glargine  22 Units SubCUTAneous Daily    insulin lispro  0-12 Units SubCUTAneous TID WC    insulin lispro  0-6 Units SubCUTAneous Nightly    famotidine  20 mg Oral Daily    enoxaparin  30 mg SubCUTAneous Daily    fentanNYL  25 mcg IntraVENous Once     prochlorperazine, heparin flush, glucose, glucagon (rDNA), dextrose, melatonin, naproxen, dextrose bolus **OR** dextrose bolus, potassium chloride, magnesium sulfate, sodium phosphate IVPB **OR** sodium phosphate IVPB **OR** sodium phosphate IVPB, polyethylene glycol, dextrose 5 % and 0.45 % NaCl  IV Drips/Infusions   sodium chloride      dextrose      sodium chloride      dextrose 5 % and 0.45 % NaCl 150 mL/hr at 06/06/22 0631    insulin 2.5 Units/hr (06/06/22 0730)     Home Medications  Medications Prior to Admission: insulin aspart (NOVOLOG FLEXPEN) 100 UNIT/ML injection pen, Inject 3 times daily before meals, per sliding scale  Insulin Pen Needle (BD PEN NEEDLE SHELBIE U/F) 32G X 4 MM MISC, Uses with insulin 4 times a day  insulin glargine (LANTUS SOLOSTAR) 100 UNIT/ML injection pen, Inject 22 Units into the skin every morning  insulin lispro, 1 Unit Dial, (HUMALOG KWIKPEN) 100 UNIT/ML SOPN, Inject 1 units per 10 grams of carbs + following sliding scale. -200 add 2U, -250 add 4U, -300 add 6U, -350 add 8U, -400 add 12U, BS over 400 add 12U  naproxen (NAPROSYN) 500 MG tablet, Take 1 tablet by mouth 2 times daily as needed for Pain  melatonin (RA MELATONIN) 3 MG TABS tablet, Take 2 tablets by mouth daily  acetaminophen (TYLENOL) 500 MG tablet, Take 1 tablet by mouth every 4 hours as needed for Pain or Fever    Diet/Nutrition   ADULT DIET; Regular; 4 carb choices (60 gm/meal)    Allergies   Patient has no known allergies. Social History   Tobacco   reports that she has never smoked. She has never used smokeless tobacco.    Alcohol     reports no history of alcohol use. Occupational history :    Family History         Problem Relation Age of Onset    Diabetes Maternal Grandmother     Diabetes Paternal Grandmother     Stroke Other     Thyroid Disease Neg Hx        Sleep History   none    ROS     REVIEW OF SYSTEMS:  CONSTITUTIONAL:  positive for  fatigue  HEENT:  headache  RESPIRATORY:  negative  CARDIOVASCULAR:  negative  GASTROINTESTINAL:  negative (n/v resolved from yesterday)  GENITOURINARY:  negative  HEMATOLOGIC/LYMPHATIC:  negative  ALLERGIC/IMMUNOLOGIC:  negative  MUSCULOSKELETAL:  positive for  Myalgias (low back pain)  NEUROLOGICAL:  negative    Lines and Devices    Peripheral IVs  Central line rt fem (6/5/22)    Mechanical Ventilation Data   VENT SETTINGS (Comprehensive)     Additional Respiratory Assessments  Heart Rate: (!) 108  Resp: 24  SpO2: 100 %    ABG  Lab Results   Component Value Date    PH 7.282 11/10/2020    PCO2 47.5 11/10/2020    PO2 20.8 11/10/2020    HCO3 21.9 11/10/2020    O2SAT 26.5 11/10/2020     Lab Results   Component Value Date    MODE RA 11/10/2020           Vitals    height is 5' 1\" (1.549 m) and weight is 103 lb 6.3 oz (46.9 kg). Her oral temperature is 98 °F (36.7 °C).  Her blood pressure is 116/63 and her pulse is 108 (abnormal). Her respiration is 24 and oxygen saturation is 100%. Temperature Range: Temp: 98 °F (36.7 °C) Temp  Av.8 °F (36.6 °C)  Min: 97.1 °F (36.2 °C)  Max: 98.2 °F (36.8 °C)  BP Range:  Systolic (34WPU), RBE:069 , Min:93 , KSZ:481     Diastolic (67HTX), SRX:06, Min:60, Max:88    Pulse Range: Pulse  Av.2  Min: 106  Max: 117  Respiration Range: Resp  Av.4  Min: 10  Max: 24  Current Pulse Ox[de-identified]  SpO2: 100 %  24HR Pulse Ox Range:  SpO2  Av %  Min: 94 %  Max: 100 %  Oxygen Amount and Delivery:        I/O (24 Hours)    Patient Vitals for the past 8 hrs:   BP Temp Temp src Pulse Resp SpO2   22 0600 116/63   (!) 108 24 100 %   22 0500 112/69   (!) 107 12 100 %   22 0400 107/73 98 °F (36.7 °C) Oral (!) 108 11 100 %   22 0300 120/80   (!) 106 13 99 %       Intake/Output Summary (Last 24 hours) at 2022 1009  Last data filed at 2022 0605  Gross per 24 hour   Intake 1797.36 ml   Output    Net 1797.36 ml     I/O last 3 completed shifts: In: 1797.4 [I.V.:1384.3; IV Piggyback:413.1]  Out: -    Date 22 0000 - 22 2359   Shift 5446-2667 4670-9503 8147-6376 24 Hour Total   INTAKE   I.V.(mL/kg) 1384. 3(29.5)   1384. 3(29.5)   IV Piggyback(mL/kg) 413.1(8.8)   413.1(8.8)   Shift Total(mL/kg) 1797. 4(38.3)   1797. 4(38.3)   OUTPUT   Shift Total(mL/kg)       Weight (kg) 46.9 46.9 46.9 46.9     Patient Vitals for the past 96 hrs (Last 3 readings):   Weight   22 2341 103 lb 6.3 oz (46.9 kg)   22 1521 108 lb (49 kg)         Drains/Tubes Outputs  None  Exam     PHYSICAL EXAM:  General appearance - alert, well appearing, and in no distress  Mental status - alert, oriented to person, place, and time  Eyes - pupils equal and reactive, extraocular eye movements intact  Ears - bilateral TM's and external ear canals normal  Nose - normal and patent, no erythema, discharge or polyps  Mouth - mucous membranes moist, pharynx normal without lesions  Neck - supple, no significant adenopathy  Chest - clear to auscultation, no wheezes, rales or rhonchi, symmetric air entry  Heart - normal rate, regular rhythm, normal S1, S2, no murmurs, rubs, clicks or gallops  Abdomen - soft, nontender, nondistended, no masses or organomegaly  Neurological - alert, oriented, normal speech, no focal findings or movement disorder noted}  Extremities - peripheral pulses normal, no pedal edema, no clubbing or cyanosis  Skin - normal coloration and turgor, no rashes, no suspicious skin lesions noted     Data   Old records and images have been reviewed    Lab Results   CBC     Lab Results   Component Value Date    WBC 12.5 06/06/2022    RBC 4.32 06/06/2022    HGB 11.8 06/06/2022    HCT 35.5 06/06/2022     06/06/2022    MCV 82.2 06/06/2022    MCH 27.3 06/06/2022    MCHC 33.2 06/06/2022    RDW 12.5 06/06/2022    NRBC 0.0 05/10/2019    SEGSPCT 58 09/29/2013    METASPCT 1.0 04/29/2020    LYMPHOPCT 25.4 06/06/2022    MONOPCT 6.3 06/06/2022    MYELOPCT 1.0 04/29/2020    BASOPCT 0.5 06/06/2022    MONOSABS 0.79 06/06/2022    LYMPHSABS 3.17 06/06/2022    EOSABS 0.01 06/06/2022    BASOSABS 0.06 06/06/2022       BMP   Lab Results   Component Value Date     06/06/2022    K 4.8 06/06/2022    K 4.1 04/02/2022     06/06/2022    CO2 14 06/06/2022    BUN 11 06/06/2022    CREATININE 0.5 06/06/2022    GLUCOSE 196 06/06/2022    CALCIUM 8.8 06/06/2022       LFTS  Lab Results   Component Value Date    ALKPHOS 115 06/05/2022    ALT 26 06/05/2022    AST 24 06/05/2022    PROT 8.9 06/05/2022    BILITOT 0.5 06/05/2022    BILIDIR <0.2 06/05/2022    IBILI see below 06/05/2022    LABALBU 4.8 06/05/2022       INR  No results for input(s): PROTIME, INR in the last 72 hours. APTT  No results for input(s): APTT in the last 72 hours.     Lactic Acid  Lab Results   Component Value Date    LACTA 2.2 06/05/2022    LACTA 1.8 04/24/2022    LACTA 1.3 04/02/2022        BNP   No results for input(s): BNP in the last 72 hours. Cultures     No results for input(s): BC in the last 72 hours. No results for input(s): Ara Lore in the last 72 hours. No results for input(s): LABURIN in the last 72 hours. Radiology   CXR (6/6/22)  No acute process. CT Scans: CT Abd/pelvis (6/6/22)  No acute process in abdomen or pelvis. Ultrasound Pelvis (6/6/22)  Normal appearance of the uterus, endometrium, and bilateral ovaries.  There   are no adnexal masses. Normal Doppler flow within the ovaries. SYSTEMS ASSESSMENT    Neuro   Alert and Oriented  Headache     RASS 0 (Alert and Calm)    Respiratory   No respiratory dysfunction. Wean oxygen as tolerated. Keep O2 sat 90-92%    Cardiovascular   No cardiovascular dysfunction  Hemodynamically stable    Gastrointestinal   Nausea and Vomiting   Abdominal pain  CT scan negative, Pelvic US negative. GI prophylaxis - Protonix    Renal   No renal dysfunction     Infectious Disease   Recent UTI, was on day 2 of ABX before admission to hospital  Denies any urinary s/s, UA negative  Keflex ordered for her recent UTI which she did not complete therapy    Hematology/Oncology   No dysfunction    Endocrine   Diabetic Ketoacidosis associated with Type 1 DM  Hyperglycemia - resolving  Hyperkalemia - resolved  Metabolic Acidosis (pH <2.58, decreased PaCO2) - resolving  Serial BGLs  BMP improved, improved gap. Social/Spiritual/DNR/Other   n/a    Diabetic diet started  Sliding scale insulin  Home dose insulin lantus    Pt possibly able to go to intermediate care today. Cristina Corona,   PGY -1    6/6/2022  10:09 AM     I personally saw, examined and provided care for the patient. Radiographs, labs and medication list were reviewed by me independently. Review of Residents documentation was conducted and revisions were made as appropriate. I agree with the above documented exam, problem list and plan of care.     Continue DKA protocol    CCT excluding procedures 32 minutes    Luis F Conn, DO

## 2022-06-06 NOTE — ED NOTES
Central Line Placement Procedure Note    Indication: vascular access, poor peripheral access, long term access and need for frequent blood draws    Consent: The patient was counseled regarding the procedure, its indications, risks, potential complications and alternatives, and any questions were answered. Consent was obtained to proceed. Procedure: The patient was positioned appropriately and the skin over the left internal jugular vein was prepped with Chloraprep and draped in a sterile fashion. Local anesthesia was obtained by infiltration using 1% Lidocaine without epinephrine. A large bore needle was used to identify the vein under US guidance. A guide wire was then inserted into the vein through the needle. A triple lumen catheter was then inserted into the vessel over the guide wire using the Seldinger technique. All ports showed good, free flowing blood return and were flushed with saline solution. The catheter was then securely fastened to the skin with suture at 18 cm. Two sutures were placed into the kit included tube clamp and proximal eyelets. An antibiotic disk was placed and the site was then covered with a sterile dressing. A post procedure X-ray was ordered and is still pending at this time. The patient tolerated the procedure well. Complications: None    Signed Jasmine Flores D.O., PGY-2. 6/5/2022 11:05 PM    Procedure was performed under direct observation for Dr. Patricia Mcneill. Photo taken of ultrasound of internal jugular vein and carotid artery in transverse plane showing hyperechoic guidewire within the lumen of the internal jugular vein.        [address] DO  Resident  06/05/22 0821

## 2022-06-07 VITALS
HEART RATE: 117 BPM | RESPIRATION RATE: 16 BRPM | HEIGHT: 61 IN | BODY MASS INDEX: 19.52 KG/M2 | SYSTOLIC BLOOD PRESSURE: 136 MMHG | OXYGEN SATURATION: 100 % | DIASTOLIC BLOOD PRESSURE: 110 MMHG | TEMPERATURE: 97.2 F | WEIGHT: 103.4 LBS

## 2022-06-07 PROBLEM — R00.0 TACHYCARDIA: Status: ACTIVE | Noted: 2022-06-07

## 2022-06-07 LAB
ANION GAP SERPL CALCULATED.3IONS-SCNC: 13 MMOL/L (ref 7–16)
BASOPHILS ABSOLUTE: 0.02 E9/L (ref 0–0.2)
BASOPHILS RELATIVE PERCENT: 0.3 % (ref 0–2)
BUN BLDV-MCNC: 17 MG/DL (ref 6–20)
CALCIUM SERPL-MCNC: 8.6 MG/DL (ref 8.6–10.2)
CHLORIDE BLD-SCNC: 102 MMOL/L (ref 98–107)
CO2: 18 MMOL/L (ref 22–29)
CREAT SERPL-MCNC: 0.7 MG/DL (ref 0.5–1)
EOSINOPHILS ABSOLUTE: 0.07 E9/L (ref 0.05–0.5)
EOSINOPHILS RELATIVE PERCENT: 0.9 % (ref 0–6)
GFR AFRICAN AMERICAN: >60
GFR NON-AFRICAN AMERICAN: >60 ML/MIN/1.73
GLUCOSE BLD-MCNC: 301 MG/DL (ref 74–99)
HCT VFR BLD CALC: 32.5 % (ref 34–48)
HEMOGLOBIN: 11.1 G/DL (ref 11.5–15.5)
IMMATURE GRANULOCYTES #: 0.02 E9/L
IMMATURE GRANULOCYTES %: 0.3 % (ref 0–5)
LYMPHOCYTES ABSOLUTE: 2.99 E9/L (ref 1.5–4)
LYMPHOCYTES RELATIVE PERCENT: 39.5 % (ref 20–42)
MAGNESIUM: 1.8 MG/DL (ref 1.6–2.6)
MCH RBC QN AUTO: 27.5 PG (ref 26–35)
MCHC RBC AUTO-ENTMCNC: 34.2 % (ref 32–34.5)
MCV RBC AUTO: 80.6 FL (ref 80–99.9)
METER GLUCOSE: 148 MG/DL (ref 74–99)
METER GLUCOSE: 212 MG/DL (ref 74–99)
METER GLUCOSE: 264 MG/DL (ref 74–99)
MONOCYTES ABSOLUTE: 0.36 E9/L (ref 0.1–0.95)
MONOCYTES RELATIVE PERCENT: 4.8 % (ref 2–12)
MRSA CULTURE ONLY: NORMAL
NEUTROPHILS ABSOLUTE: 4.11 E9/L (ref 1.8–7.3)
NEUTROPHILS RELATIVE PERCENT: 54.2 % (ref 43–80)
PDW BLD-RTO: 12.7 FL (ref 11.5–15)
PHOSPHORUS: 2.9 MG/DL (ref 2.5–4.5)
PLATELET # BLD: 244 E9/L (ref 130–450)
PMV BLD AUTO: 10.6 FL (ref 7–12)
POTASSIUM SERPL-SCNC: 3.9 MMOL/L (ref 3.5–5)
RBC # BLD: 4.03 E12/L (ref 3.5–5.5)
SODIUM BLD-SCNC: 133 MMOL/L (ref 132–146)
WBC # BLD: 7.6 E9/L (ref 4.5–11.5)

## 2022-06-07 PROCEDURE — 6370000000 HC RX 637 (ALT 250 FOR IP): Performed by: INTERNAL MEDICINE

## 2022-06-07 PROCEDURE — 36592 COLLECT BLOOD FROM PICC: CPT

## 2022-06-07 PROCEDURE — 84100 ASSAY OF PHOSPHORUS: CPT

## 2022-06-07 PROCEDURE — 36415 COLL VENOUS BLD VENIPUNCTURE: CPT

## 2022-06-07 PROCEDURE — 83735 ASSAY OF MAGNESIUM: CPT

## 2022-06-07 PROCEDURE — 82962 GLUCOSE BLOOD TEST: CPT

## 2022-06-07 PROCEDURE — 85025 COMPLETE CBC W/AUTO DIFF WBC: CPT

## 2022-06-07 PROCEDURE — 99238 HOSP IP/OBS DSCHRG MGMT 30/<: CPT | Performed by: FAMILY MEDICINE

## 2022-06-07 PROCEDURE — 80048 BASIC METABOLIC PNL TOTAL CA: CPT

## 2022-06-07 PROCEDURE — 6360000002 HC RX W HCPCS: Performed by: PEDIATRICS

## 2022-06-07 PROCEDURE — 6370000000 HC RX 637 (ALT 250 FOR IP): Performed by: STUDENT IN AN ORGANIZED HEALTH CARE EDUCATION/TRAINING PROGRAM

## 2022-06-07 RX ORDER — CEPHALEXIN 500 MG/1
500 CAPSULE ORAL EVERY 8 HOURS SCHEDULED
Qty: 9 CAPSULE | Refills: 0 | Status: SHIPPED | OUTPATIENT
Start: 2022-06-07 | End: 2022-06-10

## 2022-06-07 RX ADMIN — INSULIN LISPRO 4 UNITS: 100 INJECTION, SOLUTION INTRAVENOUS; SUBCUTANEOUS at 17:40

## 2022-06-07 RX ADMIN — INSULIN LISPRO 2 UNITS: 100 INJECTION, SOLUTION INTRAVENOUS; SUBCUTANEOUS at 11:31

## 2022-06-07 RX ADMIN — CEPHALEXIN 500 MG: 500 CAPSULE ORAL at 15:18

## 2022-06-07 RX ADMIN — INSULIN LISPRO 6 UNITS: 100 INJECTION, SOLUTION INTRAVENOUS; SUBCUTANEOUS at 09:11

## 2022-06-07 RX ADMIN — INSULIN GLARGINE 22 UNITS: 100 INJECTION, SOLUTION SUBCUTANEOUS at 09:11

## 2022-06-07 RX ADMIN — CEPHALEXIN 500 MG: 500 CAPSULE ORAL at 05:48

## 2022-06-07 RX ADMIN — FAMOTIDINE 20 MG: 20 TABLET ORAL at 09:11

## 2022-06-07 RX ADMIN — ENOXAPARIN SODIUM 30 MG: 100 INJECTION SUBCUTANEOUS at 09:16

## 2022-06-07 ASSESSMENT — PAIN SCALES - GENERAL
PAINLEVEL_OUTOF10: 0
PAINLEVEL_OUTOF10: 0

## 2022-06-07 NOTE — CONSULTS
ENDOCRINOLOGY INITIAL CONSULTATION NOTE       Date of admission: 6/5/2022  Date of service: 6/6/2022  Admitting physician: Daylin Foley MD   Primary Care Physician: Mayra Reynolds DO  Consultant physician: Janelle Benitez MD      Reason for the consultation:  DM type 1 admitted with DKA     History of Present Illness:  Services were provided through a video synchronous discussion     Jolie Martins is a 27 y.o. old female with PMH of DM type 1 admitted to [address] [place] [address] on 6/5/2022 because of N/V and malaise and found to be in DKA, endocrine team was consulted for diabetes management. The patient was recently diagnosed with UTI and started on Abx about 2 days prior to admission. Over the past few days the patient reports worsening N/V and abdominal pain. The patient told me that she decided to stop taking her insulin because she is getting so overwhelmed with all insulin shots. She denies fever, chills, chest pain, cough    Admission labs , AG 33, Bicarb 9, Cr 0.9. The patient was started on insulin drip as per DKA protocol then transitioned to sq insulin      Prior to admission  Type 1 DM was diagnosed at the age 8. Prior to admission, she was basaglar 22 units daily, Humalog with meals using carb ratio 1u:10g  + ss 1:50>150. self-blood glucose monitoring has been highly variable prior to admission. She is due for annual eye exam but denied any history of diabetic retinopathy.   The patient performs her own feet care and doesn't see podiatry service  Microvascular complications:  No Retinopathy, Nephropathy + Neuropathy   Macrovascular complications: no CAD, PVD, or Stroke    Lab Results   Component Value Date    LABA1C 14.5 06/06/2022     Inpatient diet:   Carb Restricted diet     Point of care glucose monitoring (Independently reviewed)   Recent Labs     06/06/22  0429 06/06/22  0530 06/06/22  0630 06/06/22  0730 06/06/22  0852 06/06/22  1100 06/06/22  1236 06/06/22  1712   GLUMET 168* 184* 208* 80* 80* 139* 136* 219*     Past medical history:   Past Medical History:   Diagnosis Date    Bleeding at insertion site 01/05/2018    Common femoral artery injury, right, initial encounter 01/05/2018    Depressive disorder     Diabetic k[facility]acidosis ([facility]     DM type 1 (diabetes mellitus, type 1) ([facility]     Marijuana abuse     Non-compliance     Trichimoniasis 11/19/2021       Past surgical history:  Past Surgical History:   Procedure Laterality Date    ABDOMEN SURGERY N/A 5/9/2019    INCISION AND DRAINAGE OF SUPRAPUBIC ABSESS performed by Himanshu Tabares MD at [address]      last yr       Social history:   Tobacco:   reports that she has never smoked. She has never used smokeless tobacco.  Alcohol:   reports no history of alcohol use. Drugs:   reports previous drug use. Drug: Marijuana Zoltan Mcmahon). Family history:    Family History   Problem Relation Age of Onset    Diabetes Maternal Grandmother     Diabetes Paternal Grandmother     Stroke Other     Thyroid Disease Neg Hx        Allergy and drug reactions:   No Known Allergies    Scheduled Meds:   cephALEXin  500 mg Oral 3 times per day    heparin flush  3 mL IntraVENous Q12H    insulin glargine  22 Units SubCUTAneous Daily    insulin lispro  0-12 Units SubCUTAneous TID WC    insulin lispro  0-6 Units SubCUTAneous Nightly    famotidine  20 mg Oral Daily    enoxaparin  30 mg SubCUTAneous Daily     PRN Meds:   prochlorperazine, 10 mg, Q6H PRN  heparin flush, 3 mL, PRN  glucose, 4 tablet, PRN  glucagon (rDNA), 1 mg, PRN  dextrose, 100 mL/hr, PRN  melatonin, 6 mg, Nightly PRN  naproxen, 500 mg, BID PRN  dextrose bolus, 125 mL, PRN   Or  dextrose bolus, 250 mL, PRN  polyethylene glycol, 17 g, Daily PRN      Continuous Infusions:   dextrose         Review of Systems  All systems reviewed.  All negative except for symptoms mentioned in HPI     OBJECTIVE    /83   Pulse (!) 110   Temp 98.1 °F (36.7 °C) (Oral)   Resp 15   Ht 5' 1\" (1.549 m)   Wt 103 lb 6.3 oz (46.9 kg)   SpO2 99%   BMI 19.54 kg/m²     Intake/Output Summary (Last 24 hours) at 6/6/2022 2005  Last data filed at 6/6/2022 1800  Gross per 24 hour   Intake 2827.99 ml   Output    Net 2827.99 ml     Physical examination:  Due to this being a TeleHealth encounter, evaluation of the following organ systems is limited: Vitals/Constitutional/EENT/Resp/CV/GI//MS/Neuro/Skin/Heme-Lymph-Imm. Modified physical exam through Telemedicine camera    General: Communicating well via camera   Neck: no obvious neck mass. No obvious neck deformity     CVS: no distress   Chest: no distress. Chest is moving with respiration    Extremities:  no visible tremor  Skin: No visible rashes as seen from camera   Musculoskeletal: no visible deformity  Neuro: Alert and oriented to person, place, and time. Psychiatric: Normal mood and affect. Behavior is normal    Review of Laboratory Data:  I personally reviewed the following labs:   Recent Labs     06/05/22  1532 06/06/22  0305   WBC 9.8 12.5*   RBC 5.16 4.32   HGB 14.2 11.8   HCT 43.6 35.5   MCV 84.5 82.2   MCH 27.5 27.3   MCHC 32.6 33.2   RDW 12.5 12.5    329   MPV 11.3 10.9     Recent Labs     06/05/22  1532 06/05/22  1922 06/05/22  2345 06/06/22  0305 06/06/22  0730   *   < > 136 134 132   K 5.2*   < > 4.0 4.9 4.8   CL 89*   < > 105 105 104   CO2 9*   < > 10* 13* 14*   BUN 21*   < > 17 14 11   CREATININE 0.9   < > 0.6 0.6 0.5   GLUCOSE 644*   < > 153* 153* 196*   CALCIUM 11.2*   < > 9.5 8.9 8.8   PROT 8.9*  --   --   --   --    LABALBU 4.8  --   --   --   --    BILITOT 0.5  --   --   --   --    ALKPHOS 115*  --   --   --   --    AST 24  --   --   --   --    ALT 26  --   --   --   --     < > = values in this interval not displayed.      Beta-Hydroxybutyrate   Date Value Ref Range Status   06/05/2022 >4.50 (H) 0.02 - 0.27 mmol/L Final   04/24/2022 >4.50 (H) 0.02 - 0.27 mmol/L Final   04/02/2022 1.96 (H) 0.02 - 0.27 mmol/L Final     Lab Results   Component Value Date    LABA1C 14.5 06/06/2022    LABA1C 15.6 04/25/2022    LABA1C 16.8 01/08/2022     Lab Results   Component Value Date/Time    TSH 0.489 01/08/2022 12:13 AM     Lab Results   Component Value Date    LABA1C 14.5 06/06/2022    GLUCOSE 196 06/06/2022    MALBCR 53.2 08/24/2021    LABMICR 81.9 08/24/2021    LABCREA 154 08/24/2021     Lab Results   Component Value Date    TRIG 337 01/08/2022    HDL 48 01/08/2022    LDLCALC 129 01/08/2022    CHOL 244 01/08/2022       Blood culture   Lab Results   Component Value Date    BC 5 Days no growth 02/24/2021    BC 5 Days no growth 11/10/2020       Radiology:  XR CHEST PORTABLE   Final Result   No acute cardiopulmonary process. CT ABDOMEN PELVIS W IV CONTRAST Additional Contrast? None   Final Result   No acute process in abdomen or pelvis. US PELVIS COMPLETE   Final Result   Normal appearance of the uterus, endometrium, and bilateral ovaries. There   are no adnexal masses. Normal Doppler flow within the ovaries. US DUP ABD PEL RETRO SCROT COMPLETE   Final Result   Normal appearance of the uterus, endometrium, and bilateral ovaries. There   are no adnexal masses. Normal Doppler flow within the ovaries.            Medical Records/Labs/Images review:   I personally reviewed and summarized previous records   All labs and imaging were reviewed independently     Bob Espinoza, a 27 y.o.-old female seen today for inpatient diabetes management     Brittle type 1 DM admitted with DKA   · Patient's DM is profoundly uncontrolled due to poor compliance with insulin therapy and diet  · We recommend following insulin regimen  · Lantus 22U daily in AM   · Medium dose sliding scale   · To add prandial insulin coverage once appetite improve   · Glucose check before meals and at bed time   · Will titrate insulin dose based on blood glucose trend & insulin requirement     Recurrent DKA due to medications non-compliance   · Transitioned to sq insulin yesterday. · Adjusted insulin regimen as per above      Thank you for allowing us to participate in the care of this patient. Please do not hesitate to contact us with any additional questions. Vani Sood MD  Endocrinologist, [facility]   [address] [place] [id number]   Phone: [phone]  Fax: [phone]  --------------------------------------------  An electronic signature was used to authenticate this note.  Boris Miranda MD on 6/6/2022 at 8:05 PM

## 2022-06-07 NOTE — DISCHARGE SUMMARY
[facility] Physician Discharge Summary       Jose Blount MD  [address] [facility]  [address] Lizzie Tanner    In 1 week      Yahir Mariscal [phone]    In 3 days  Hospital follow up      Activity level: as tolerated    Diet: ADULT DIET; Regular; 4 carb choices (60 gm/meal)    Labs: none ordered for discharge    Condition at discharge: good/stable    Dispo: to home without services      Patient ID:  Andrew Gloria  [id number]  68 y.o.  1995    Admit date: 6/5/2022    Discharge date and time:  6/7/2022  5:35 PM    Admission Diagnoses: Principal Problem:    DKA, type 1, not at goal [facility])  Active Problems:    Tachycardia    Diabetes mellitus type 1, uncontrolled ([facility]  Resolved Problems:    * No resolved hospital problems. *      Discharge Diagnoses: Principal Problem:    DKA, type 1, not at goal [facility])  Active Problems:    Tachycardia    Diabetes mellitus type 1, uncontrolled ([facility]  Resolved Problems:    * No resolved hospital problems. *      Consults:  IP CONSULT TO CRITICAL CARE  IP CONSULT TO DIABETES EDUCATOR  IP CONSULT TO DIABETES EDUCATOR  IP CONSULT TO ENDOCRINOLOGY    Procedures:   None    Hospital Course:   27 yr old female with type 1 DM since age [address] admitted for DKA -- initial bicarbonate level 9, venous pH at 7.16, venous glucose 644, sodium 131, beta-hydroxybutyrate greater than 4.5 -->  Place in ICU, on insulin drip, anion gap closed the morning after admission and then placed on diabetic diet which she tolerated. Pt recently had a UTI and took only 2 days of abx -- received Keflex here, will send out on 3 total days. UA was normal except for 4+ glucose and 2+ ketones. Nitrite negative. Seen by Dr. Nancy Pino with endocrinology -- wants her to continue Lantus at 22 units and sliding scale as prior with no changes for now.   The patient admitted that she got tired of injecting insulin so frequently and stopped. This is likely the trigger for this DKA episode. On day of discharge -- pt feels quite well. No complaints, no events overnight, tolerating full meals with no N/V, no fatigue. Does have resting tachycardia -- 100 to 105. Exertional HR -- 117. BP's 130's/100 today, increased from yesterday. Consideration for outpatient beta blocker, and pt likely would benefit from lisinopril for renal protection in DM. Not started here. Renal function normal.    Vaginal ultrasound, CT of abd/pelvis were negative for any acute pathology. Ovarian dopplers -- normal.    Hemoglobin A1c found here to be 14.5    Discharge Exam:    Alert, in NAD, not in respiratory distress, breathing comfortably on RA at rest in bed, mental status normal, well appearing  Eyes and mouth clear, moist, tongue and uvula midline, neck supple, no JVD  S1/S2 with RRR, no M/R/G  Lungs CTA bilaterally, no W/R/R  Abd soft/NT/ND/normoactive BS's  No LE edema, pos pedal pulses, no cyanosis, good cap refill of digits  Skin warm, dry, good turgor, no rashes, no jaundice     I/O last 3 completed shifts: In: 3308 [P.O.:480; I.V.:2346.6; IV Piggyback:481.4]  Out: -   No intake/output data recorded. LABS:  Recent Labs     06/06/22  0305 06/06/22  0730 06/07/22  0445    132 133   K 4.9 4.8 3.9    104 102   CO2 13* 14* 18*   BUN 14 11 17   CREATININE 0.6 0.5 0.7   GLUCOSE 153* 196* 301*   CALCIUM 8.9 8.8 8.6       Recent Labs     06/05/22  1532 06/06/22  0305 06/07/22  0445   WBC 9.8 12.5* 7.6   RBC 5.16 4.32 4.03   HGB 14.2 11.8 11.1*   HCT 43.6 35.5 32.5*   MCV 84.5 82.2 80.6   MCH 27.5 27.3 27.5   MCHC 32.6 33.2 34.2   RDW 12.5 12.5 12.7    329 244   MPV 11.3 10.9 10.6       No results for input(s): POCGLU in the last 72 hours.       Imaging:  US PELVIS COMPLETE  Result Date: 6/5/2022  EXAMINATION: PELVIC ULTRASOUND; DOPPLER EVALUATION OF THE PELVIS 6/5/2022 TECHNIQUE: Transabdominal pelvic ultrasound duplex ultrasound using B-mode/gray scaled imaging, Doppler spectral analysis and color flow Doppler was obtained. COMPARISON: Pelvic ultrasound from May 7, 2019 HISTORY: ORDERING SYSTEM PROVIDED HISTORY: Pain TECHNOLOGIST PROVIDED HISTORY: Reason for exam:->Pain What reading provider will be dictating this exam?->CRC FINDINGS: Measurements: Uterus: 6.9 cm Endometrial stripe: 3-4 mm Right Ovary:4.3 x 2.3 x 1.1 cm Left Ovary: 3.1 x 2.4 x 1.5 cm Ultrasound Findings: Uterus: Uterus demonstrates normal myometrial echotexture. Endometrial stripe: Endometrial stripe is within normal limits. Right Ovary: Right ovary is within normal limits. There is normal arterial and venous Doppler flow. No right adnexal mass. Left Ovary:  Left ovary is within normal limits. There is normal arterial and venous Doppler flow. No left adnexal mass. Free Fluid: No evidence of free fluid. Normal appearance of the uterus, endometrium, and bilateral ovaries. There are no adnexal masses. Normal Doppler flow within the ovaries. CT ABDOMEN PELVIS W IV CONTRAST Additional Contrast? None  Result Date: 6/5/2022  EXAMINATION: CT OF THE ABDOMEN AND PELVIS WITH CONTRAST 6/5/2022 5:59 pm TECHNIQUE: CT of the abdomen and pelvis was performed with the administration of intravenous contrast. Multiplanar reformatted images are provided for review. Automated exposure control, iterative reconstruction, and/or weight based adjustment of the mA/kV was utilized to reduce the radiation dose to as low as reasonably achievable. COMPARISON: April 3, 2022 HISTORY: ORDERING SYSTEM PROVIDED HISTORY: rlq pain, in dka r/o appendectomy TECHNOLOGIST PROVIDED HISTORY: Additional Contrast?->None Reason for exam:->rlq pain, in dka r/o appendectomy Decision Support Exception - unselect if not a suspected or confirmed emergency medical condition->Emergency Medical Condition (MA) FINDINGS: There is no bowel obstruction, free air, or free fluid in the abdomen or pelvis.   Moderate amount of stool throughout the colon. The appendix is visualized in the right lower quadrant and appears unremarkable. There is no intrahepatic or extrahepatic bile duct dilatation. Gallbladder is unremarkable. No evidence of acute pancreatitis. Spleen is nonenlarged. Adrenal glands are unremarkable. There is normal attenuation to both kidneys. No hydronephrosis. No perinephric fat stranding. No retroperitoneal lymphadenopathy. Urinary bladder is normal in contour. No evidence of pneumonia in lung bases. No acute process in abdomen or pelvis. XR CHEST PORTABLE  Result Date: 6/6/2022  EXAMINATION: ONE XRAY VIEW OF THE CHEST6/5/2022 TECHNIQUE: Frontal view submitted COMPARISON: None. HISTORY: ORDERING SYSTEM PROVIDED HISTORY: lij central line TECHNOLOGIST PROVIDED HISTORY: Reason for exam:->lij central line FINDINGS: The lungs are clear without focal consolidation, large pleural effusion, or pneumothorax. The cardiomediastinal silhouette is stable. No acute cardiopulmonary process. Result Date: 6/5/2022  EXAMINATION: PELVIC ULTRASOUND; DOPPLER EVALUATION OF THE PELVIS 6/5/2022 TECHNIQUE: Transabdominal pelvic ultrasound duplex ultrasound using B-mode/gray scaled imaging, Doppler spectral analysis and color flow Doppler was obtained. COMPARISON: Pelvic ultrasound from May 7, 2019 HISTORY: ORDERING SYSTEM PROVIDED HISTORY: Pain TECHNOLOGIST PROVIDED HISTORY: Reason for exam:->Pain What reading provider will be dictating this exam?->CRC FINDINGS: Measurements: Uterus: 6.9 cm Endometrial stripe: 3-4 mm Right Ovary:4.3 x 2.3 x 1.1 cm Left Ovary: 3.1 x 2.4 x 1.5 cm Ultrasound Findings: Uterus: Uterus demonstrates normal myometrial echotexture. Endometrial stripe: Endometrial stripe is within normal limits. Right Ovary: Right ovary is within normal limits. There is normal arterial and venous Doppler flow. No right adnexal mass. Left Ovary:  Left ovary is within normal limits.  There is normal arterial and venous Doppler flow. No left adnexal mass. Free Fluid: No evidence of free fluid. Normal appearance of the uterus, endometrium, and bilateral ovaries. There are no adnexal masses. Normal Doppler flow within the ovaries. Patient Instructions:   Current Discharge Medication List      START taking these medications    Details   glucose 4g chewable tablet Take 4 tablets by mouth as needed for Low blood sugar  Qty: 60 tablet, Refills: 3      cephALEXin (KEFLEX) 500 MG capsule Take 1 capsule by mouth every 8 hours for 3 days  Qty: 9 capsule, Refills: 0         CONTINUE these medications which have NOT CHANGED    Details   Insulin Pen Needle (BD PEN NEEDLE SHELBIE U/F) 32G X 4 MM MISC Uses with insulin 4 times a day  Qty: 250 each, Refills: 5      insulin glargine (LANTUS SOLOSTAR) 100 UNIT/ML injection pen Inject 22 Units into the skin every morning  Qty: 10 pen, Refills: 3      insulin lispro, 1 Unit Dial, (HUMALOG KWIKPEN) 100 UNIT/ML SOPN Inject 1 units per 10 grams of carbs + following sliding scale. -200 add 2U, -250 add 4U, -300 add 6U, -350 add 8U, -400 add 12U, BS over 400 add 12U  Qty: 10 pen, Refills: 2      naproxen (NAPROSYN) 500 MG tablet Take 1 tablet by mouth 2 times daily as needed for Pain  Qty: 60 tablet, Refills: 0      melatonin (RA MELATONIN) 3 MG TABS tablet Take 2 tablets by mouth daily  Qty: 60 tablet, Refills: 0    Associated Diagnoses: Insomnia, unspecified type      acetaminophen (TYLENOL) 500 MG tablet Take 1 tablet by mouth every 4 hours as needed for Pain or Fever  Qty: 20 tablet, Refills: 0         STOP taking these medications       insulin aspart (NOVOLOG FLEXPEN) 100 UNIT/ML injection pen Comments:   Reason for Stopping:                 Note that less than 30 minutes was spent in preparing discharge papers, discussing discharge with patient, medication review, etc.    NOTE: This report was transcribed using voice recognition software.  Every effort was made to ensure accuracy; however, inadvertent computerized transcription errors may be present.      Signed:  Electronically signed by Roman Marshall DO on 6/7/2022 at 5:35 PM

## 2022-06-07 NOTE — PATIENT CARE CONFERENCE
P Quality Flow/Interdisciplinary Rounds Progress Note        Quality Flow Rounds held on June 7, 2022    Disciplines Attending:  Bedside Nurse, ,  and Nursing Unit Leadership    Deshaun Romero was admitted on 6/5/2022  3:25 PM    Anticipated Discharge Date:       Disposition:    Gavino Score:  Gavino Scale Score: 22    Readmission Risk              Risk of Unplanned Readmission:  24           Discussed patient goal for the day, patient clinical progression, and barriers to discharge.   The following Goal(s) of the Day/Commitment(s) have been identified:  Discharge Mala Smith RN  June 7, 2022

## 2022-06-07 NOTE — CARE COORDINATION
Updated plan of care. Transfer from ICU. Continue to monitor glucose. Plan remains home with no needs.  Will continue to follow-mjo

## 2022-06-07 NOTE — DISCHARGE INSTR - DIET
Good nutrition is important when healing from an illness, injury, or surgery. Follow any nutrition recommendations given to you during your hospital stay. If you were given an oral nutrition supplement while in the hospital, continue to take this supplement at home. You can take it with meals, in-between meals, and/or before bedtime. These supplements can be purchased at most local grocery stores, pharmacies, and chain [employer]-stores. If you have any questions about your diet or nutrition, call the hospital and ask for the dietitian.       Diabetic diet as per Dr. Dudley Parks

## 2022-06-07 NOTE — PLAN OF CARE
Problem: Discharge Planning  Goal: Discharge to home or other facility with appropriate resources  Outcome: Completed     Problem: Pain  Goal: Verbalizes/displays adequate comfort level or baseline comfort level  Outcome: Completed  Flowsheets (Taken 6/7/2022 0615 by Alie Randolph RN)  Verbalizes/displays adequate comfort level or baseline comfort level: Assess pain using appropriate pain scale

## 2022-06-09 ENCOUNTER — TELEPHONE (OUTPATIENT)
Dept: PRIMARY CARE CLINIC | Age: 27
End: 2022-06-09

## 2022-06-09 NOTE — TELEPHONE ENCOUNTER
[facility] Transitions Initial Follow Up Call    Outreach made within 2 business days of discharge: Yes    Patient: Murali Duggan Patient : 1995   MRN: [id number]  Reason for Admission: DKA, type 1,not at goal  Discharge Date: 22       Spoke with: Raquel South    Discharge department/facility: [facility] Interactive Patient Contact:  Was patient able to fill all prescriptions: Yes  Was patient instructed to bring all medications to the follow-up visit: Yes  Is patient taking all medications as directed in the discharge summary?  Yes  Does patient understand their discharge instructions: Yes  Does patient have questions or concerns that need addressed prior to 7-14 day follow up office visit: no    Scheduled appointment with PCP within 7-14 days    Follow Up  Future Appointments   Date Time Provider Joleen Silver   2022  3:30 PM DO Rosa Aguirre [facility]   2022  2:00 PM Yuli Baltazar DO [address] [place] [address]

## 2022-06-23 ENCOUNTER — HOSPITAL ENCOUNTER (EMERGENCY)
Age: 27
Discharge: LEFT BEFORE DISCHARGE | DRG: 420 | End: 2022-06-23
Attending: STUDENT IN AN ORGANIZED HEALTH CARE EDUCATION/TRAINING PROGRAM
Payer: COMMERCIAL

## 2022-06-23 VITALS
OXYGEN SATURATION: 99 % | BODY MASS INDEX: 20.78 KG/M2 | DIASTOLIC BLOOD PRESSURE: 80 MMHG | TEMPERATURE: 98.2 F | HEART RATE: 64 BPM | WEIGHT: 110 LBS | RESPIRATION RATE: 18 BRPM | SYSTOLIC BLOOD PRESSURE: 132 MMHG

## 2022-06-23 DIAGNOSIS — E10.10 DKA, TYPE 1, NOT AT GOAL (HCC): Primary | ICD-10-CM

## 2022-06-23 LAB
ANION GAP SERPL CALCULATED.3IONS-SCNC: 19 MMOL/L (ref 7–16)
BACTERIA: ABNORMAL /HPF
BASOPHILS ABSOLUTE: 0.04 E9/L (ref 0–0.2)
BASOPHILS RELATIVE PERCENT: 0.5 % (ref 0–2)
BETA-HYDROXYBUTYRATE: 3.16 MMOL/L (ref 0.02–0.27)
BILIRUBIN URINE: NEGATIVE
BLOOD, URINE: NEGATIVE
BUN BLDV-MCNC: 13 MG/DL (ref 6–20)
CALCIUM SERPL-MCNC: 8.8 MG/DL (ref 8.6–10.2)
CHLORIDE BLD-SCNC: 84 MMOL/L (ref 98–107)
CLARITY: CLEAR
CO2: 18 MMOL/L (ref 22–29)
COLOR: ABNORMAL
CREAT SERPL-MCNC: 0.6 MG/DL (ref 0.5–1)
EOSINOPHILS ABSOLUTE: 0.04 E9/L (ref 0.05–0.5)
EOSINOPHILS RELATIVE PERCENT: 0.5 % (ref 0–6)
EPITHELIAL CELLS, UA: ABNORMAL /HPF
GFR AFRICAN AMERICAN: >60
GFR NON-AFRICAN AMERICAN: >60 ML/MIN/1.73
GLUCOSE BLD-MCNC: 813 MG/DL (ref 74–99)
GLUCOSE URINE: >=1000 MG/DL
HCG(URINE) PREGNANCY TEST: NEGATIVE
HCT VFR BLD CALC: 36.9 % (ref 34–48)
HEMOGLOBIN: 12.4 G/DL (ref 11.5–15.5)
IMMATURE GRANULOCYTES #: 0.02 E9/L
IMMATURE GRANULOCYTES %: 0.3 % (ref 0–5)
INFLUENZA A BY PCR: NOT DETECTED
INFLUENZA B BY PCR: NOT DETECTED
KETONES, URINE: ABNORMAL MG/DL
LACTIC ACID, SEPSIS: 1.4 MMOL/L (ref 0.5–1.9)
LEUKOCYTE ESTERASE, URINE: NEGATIVE
LYMPHOCYTES ABSOLUTE: 2.57 E9/L (ref 1.5–4)
LYMPHOCYTES RELATIVE PERCENT: 34.7 % (ref 20–42)
MCH RBC QN AUTO: 27.6 PG (ref 26–35)
MCHC RBC AUTO-ENTMCNC: 33.6 % (ref 32–34.5)
MCV RBC AUTO: 82.2 FL (ref 80–99.9)
MONOCYTES ABSOLUTE: 0.41 E9/L (ref 0.1–0.95)
MONOCYTES RELATIVE PERCENT: 5.5 % (ref 2–12)
NEUTROPHILS ABSOLUTE: 4.33 E9/L (ref 1.8–7.3)
NEUTROPHILS RELATIVE PERCENT: 58.5 % (ref 43–80)
NITRITE, URINE: NEGATIVE
PDW BLD-RTO: 12.9 FL (ref 11.5–15)
PH UA: 6 (ref 5–9)
PH VENOUS: 7.36 (ref 7.35–7.45)
PLATELET # BLD: 245 E9/L (ref 130–450)
PMV BLD AUTO: 11.1 FL (ref 7–12)
POTASSIUM REFLEX MAGNESIUM: 4.6 MMOL/L (ref 3.5–5)
PROTEIN UA: NEGATIVE MG/DL
RBC # BLD: 4.49 E12/L (ref 3.5–5.5)
RBC UA: ABNORMAL /HPF (ref 0–2)
SARS-COV-2, NAAT: NOT DETECTED
SODIUM BLD-SCNC: 121 MMOL/L (ref 132–146)
SPECIFIC GRAVITY UA: <=1.005 (ref 1–1.03)
STREP GRP A PCR: NEGATIVE
UROBILINOGEN, URINE: 0.2 E.U./DL
WBC # BLD: 7.4 E9/L (ref 4.5–11.5)
WBC UA: ABNORMAL /HPF (ref 0–5)

## 2022-06-23 PROCEDURE — 81025 URINE PREGNANCY TEST: CPT

## 2022-06-23 PROCEDURE — 82010 KETONE BODYS QUAN: CPT

## 2022-06-23 PROCEDURE — 87502 INFLUENZA DNA AMP PROBE: CPT

## 2022-06-23 PROCEDURE — 83605 ASSAY OF LACTIC ACID: CPT

## 2022-06-23 PROCEDURE — 2580000003 HC RX 258: Performed by: STUDENT IN AN ORGANIZED HEALTH CARE EDUCATION/TRAINING PROGRAM

## 2022-06-23 PROCEDURE — 87880 STREP A ASSAY W/OPTIC: CPT

## 2022-06-23 PROCEDURE — 82800 BLOOD PH: CPT

## 2022-06-23 PROCEDURE — 85025 COMPLETE CBC W/AUTO DIFF WBC: CPT

## 2022-06-23 PROCEDURE — 6370000000 HC RX 637 (ALT 250 FOR IP): Performed by: STUDENT IN AN ORGANIZED HEALTH CARE EDUCATION/TRAINING PROGRAM

## 2022-06-23 PROCEDURE — 80048 BASIC METABOLIC PNL TOTAL CA: CPT

## 2022-06-23 PROCEDURE — 99284 EMERGENCY DEPT VISIT MOD MDM: CPT

## 2022-06-23 PROCEDURE — 87635 SARS-COV-2 COVID-19 AMP PRB: CPT

## 2022-06-23 PROCEDURE — 96374 THER/PROPH/DIAG INJ IV PUSH: CPT

## 2022-06-23 PROCEDURE — 81001 URINALYSIS AUTO W/SCOPE: CPT

## 2022-06-23 RX ORDER — POTASSIUM CHLORIDE 7.45 MG/ML
10 INJECTION INTRAVENOUS PRN
Status: DISCONTINUED | OUTPATIENT
Start: 2022-06-23 | End: 2022-06-23 | Stop reason: HOSPADM

## 2022-06-23 RX ORDER — 0.9 % SODIUM CHLORIDE 0.9 %
15 INTRAVENOUS SOLUTION INTRAVENOUS ONCE
Status: COMPLETED | OUTPATIENT
Start: 2022-06-23 | End: 2022-06-23

## 2022-06-23 RX ORDER — DEXTROSE AND SODIUM CHLORIDE 5; .45 G/100ML; G/100ML
INJECTION, SOLUTION INTRAVENOUS CONTINUOUS PRN
Status: DISCONTINUED | OUTPATIENT
Start: 2022-06-23 | End: 2022-06-23 | Stop reason: HOSPADM

## 2022-06-23 RX ORDER — 0.9 % SODIUM CHLORIDE 0.9 %
1000 INTRAVENOUS SOLUTION INTRAVENOUS ONCE
Status: COMPLETED | OUTPATIENT
Start: 2022-06-23 | End: 2022-06-23

## 2022-06-23 RX ORDER — SODIUM CHLORIDE 450 MG/100ML
INJECTION, SOLUTION INTRAVENOUS CONTINUOUS
Status: DISCONTINUED | OUTPATIENT
Start: 2022-06-23 | End: 2022-06-23 | Stop reason: HOSPADM

## 2022-06-23 RX ORDER — MAGNESIUM SULFATE 1 G/100ML
1000 INJECTION INTRAVENOUS PRN
Status: DISCONTINUED | OUTPATIENT
Start: 2022-06-23 | End: 2022-06-23 | Stop reason: HOSPADM

## 2022-06-23 RX ADMIN — SODIUM CHLORIDE 1000 ML: 9 INJECTION, SOLUTION INTRAVENOUS at 02:08

## 2022-06-23 RX ADMIN — INSULIN HUMAN 5 UNITS: 100 INJECTION, SOLUTION PARENTERAL at 03:17

## 2022-06-23 RX ADMIN — SODIUM CHLORIDE 749 ML: 9 INJECTION, SOLUTION INTRAVENOUS at 03:18

## 2022-06-23 ASSESSMENT — PAIN - FUNCTIONAL ASSESSMENT: PAIN_FUNCTIONAL_ASSESSMENT: NONE - DENIES PAIN

## 2022-06-23 NOTE — Clinical Note
The patient has decided to leave against medical advice, because does not wish to be admitted. She has normal mental status and adequate capacity to make medical decisions. The patient refuses hospital admission and wants to be discharged. T he risks have been explained to the patient, including worsening illness, chronic pain, permanent disability, and death. The benefits of admission have also been explained, including the availability and proximity of nurses, physicians, monitoring , diagnostic testing, and treatment. The patient was able to understand and state the risks and benefits of hospital admission. This was witnessed by nurse Margy Benito and me. She had the opportunity to ask questions about her medical condition. T he patient was treated to the extent that she would allow, and knows that she may return for care at any time.     Follow up has been discussed and arranged with PCP

## 2022-06-23 NOTE — ED NOTES
PT states over the last week pt has developed a cough and has been having trouble keeping her blood sugar controlled      Catie Singh RN  06/23/22 2978

## 2022-06-23 NOTE — ED PROVIDER NOTES
ED  Provider Note  Admit Date/RoomTime: 6/23/2022  1:30 AM  ED Room: 06/06      History of Present Illness:  6/23/22, Time: 1:34 AM EDT  Chief Complaint   Patient presents with    Hyperglycemia     states BGL meter @ home reading HI          Mary Valdez is a 27 y.o. female presenting to the ED for hyperglycemia, Monday started, URI symptoms, no f/c, cough, productive, no cp, +heartburn, taking nyquil not helping, exac by none. URI sx constant since last Monday. No dysuria or hematuria. No n/v/d/c. Sx mild, persistent. BG HI at home. Review of Systems:   A complete review of systems was performed and pertinent positives and negatives are stated within HPI, all other systems reviewed and are negative.        --------------------------------------------- PATIENT HISTORY--------------------------------------------  Past Medical History:  has a past medical history of Bleeding at insertion site, Common femoral artery injury, right, initial encounter, Depressive disorder, Diabetic ketoacidosis ([facility] [place] [address] DM type 1 (diabetes mellitus, type 1) ([facility] [address] Marijuana abuse, Non-compliance, and Trichimoniasis. Past Surgical History:  has a past surgical history that includes Abdomen surgery (N/A, 5/9/2019) and Bartholin gland cyst excision. Family History: family history includes Diabetes in her maternal grandmother and paternal grandmother; Stroke in an other family member. . Unless otherwise noted, family history is non contributory    Social History:  reports that she has never smoked. She has never used smokeless tobacco. She reports previous drug use. Drug: Marijuana Candiss Littler). She reports that she does not drink alcohol. The patients home medications have been reviewed. Allergies: Patient has no known allergies.     I have reviewed the past medical history, past surgical history, social history, and family history    ---------------------------------------------------PHYSICAL EXAM--------------------------------------    Constitutional/General: Alert and oriented x3  Head: Normocephalic and atraumatic  Eyes: PERRL, EOMI, sclera non icteric  ENT: Oropharynx with mild erythema, handling secretions, no trismus  Neck: Supple, full ROM, no stridor, no meningismus  Respiratory: lctab  Cardiovascular: RRR, no R/G/M, 2+ peripheral pulses  Chest: No chest wall tenderness, equal chest rise  Gastrointestinal:  sntnd  Musculoskeletal: Extremities warm and well perfused, moving all extremities  Skin: skin warm and dry. No rashes. Neurologic: No focal deficits, strength and sensation grossly intact   Psychiatric: Normal Affect, behavior normal           -------------------------------------------------- RESULTS -------------------------------------------------  I have personally reviewed all laboratory and imaging results for this patient. Results are listed below.      LABS: (Lab results interpreted by me)  Results for orders placed or performed during the hospital encounter of 06/23/22   Strep Screen Group A Throat    Specimen: Throat   Result Value Ref Range    Strep Grp A PCR Negative Negative   COVID-19, Rapid    Specimen: Nasopharyngeal Swab   Result Value Ref Range    SARS-CoV-2, NAAT Not Detected Not Detected   RAPID INFLUENZA A/B ANTIGENS    Specimen: Nasopharyngeal   Result Value Ref Range    Influenza A by PCR Not Detected Not Detected    Influenza B by PCR Not Detected Not Detected   CBC with Auto Differential   Result Value Ref Range    WBC 7.4 4.5 - 11.5 E9/L    RBC 4.49 3.50 - 5.50 E12/L    Hemoglobin 12.4 11.5 - 15.5 g/dL    Hematocrit 36.9 34.0 - 48.0 %    MCV 82.2 80.0 - 99.9 fL    MCH 27.6 26.0 - 35.0 pg    MCHC 33.6 32.0 - 34.5 %    RDW 12.9 11.5 - 15.0 fL    Platelets 663 294 - 547 E9/L    MPV 11.1 7.0 - 12.0 fL    Neutrophils % 58.5 43.0 - 80.0 %    Immature Granulocytes % 0.3 0.0 - 5.0 %    Lymphocytes % 34.7 20.0 - 42.0 %    Monocytes % 5.5 2.0 - 12.0 %    Eosinophils % 0.5 within the ED encounter and vital signs as below have been reviewed by myself  /80   Pulse 64   Temp 98.2 °F (36.8 °C) (Oral)   Resp 18   Wt 110 lb (49.9 kg)   SpO2 99%   BMI 20.78 kg/m²      The patients available past medical records and past encounters were reviewed. ------------------------------ ED COURSE/MEDICAL DECISION MAKING----------------------  Medications   dextrose bolus 10% 125 mL (has no administration in time range)     Or   dextrose bolus 10% 250 mL (has no administration in time range)   potassium chloride 10 mEq/100 mL IVPB (Peripheral Line) (has no administration in time range)   magnesium sulfate 1000 mg in dextrose 5% 100 mL IVPB (has no administration in time range)   sodium phosphate 10 mmol in sodium chloride 0.9 % 250 mL IVPB (has no administration in time range)     Or   sodium phosphate 15 mmol in dextrose 5 % 250 mL IVPB (has no administration in time range)     Or   sodium phosphate 20 mmol in dextrose 5 % 500 mL IVPB (has no administration in time range)   0.9 % sodium chloride bolus (0 mL/kg × 49.9 kg IntraVENous Stopped 6/23/22 0335)     Followed by   0.45 % sodium chloride infusion (has no administration in time range)   dextrose 5 % and 0.45 % sodium chloride infusion (has no administration in time range)   insulin regular (HUMULIN R;NOVOLIN R) 100 Units in sodium chloride 0.9 % 100 mL infusion (has no administration in time range)   0.9 % sodium chloride bolus (0 mLs IntraVENous Stopped 6/23/22 0322)   insulin regular (HUMULIN R;NOVOLIN R) injection 5 Units (5 Units IntraVENous Given 6/23/22 0317)       I, Dr. Delvin Salomon, am the primary provider of record    Medical Decision Making:   DKA, T1DM, mild. Insulin gtt. [facility] ICU. Spoke to Sultana Pool intensivist   This patient is departing [address] The patient has capacity to make this decision.  I have explained at length that the consequences of departure against medical advice include disability or death. The patient is able to explain in their own words the presumptive diagnosis and the consequences of departure against medical advice and is alert and oriented at this time. They have been advised that they should return to the Emergency Department immediately if they experience any new or worsening symptoms, or if they determine at a later time or date to be seen. ED Counseling: This emergency provider has spoken with the patient and any family present to discuss clinical status, results, plan of care, diagnosis and prognosis as able to be determined at this time. Any questions were answered and patient and/or family/POA are agreeable with the plan.       --------------------------------- IMPRESSION AND DISPOSITION ---------------------------------    IMPRESSION  1. DKA, type 1, not at goal [facility])        DISPOSITION  Disposition: Other Disposition: Left AMA  Patient condition is serious      This report was transcribed using voice recognition software. Every effort was made to ensure accuracy; however, transcription errors may be present.          Carmencita Johnson MD  06/23/22 4505

## 2022-06-23 NOTE — ED NOTES
PT refused to let RN establish second IV at this time. Pt requesting AMA form, stating she does not want to be admitted.  Dr. Naomi Blake made aware      Gómez Taylor RN  06/23/22 9245

## 2022-06-24 ENCOUNTER — HOSPITAL ENCOUNTER (INPATIENT)
Age: 27
LOS: 1 days | Discharge: HOME | DRG: 420 | End: 2022-06-25
Attending: EMERGENCY MEDICINE | Admitting: INTERNAL MEDICINE
Payer: COMMERCIAL

## 2022-06-24 DIAGNOSIS — E10.65 HYPERGLYCEMIA DUE TO TYPE 1 DIABETES MELLITUS (HCC): ICD-10-CM

## 2022-06-24 DIAGNOSIS — E10.10 DIABETIC KETOACIDOSIS WITHOUT COMA ASSOCIATED WITH TYPE 1 DIABETES MELLITUS (HCC): Primary | ICD-10-CM

## 2022-06-24 LAB
ALBUMIN SERPL-MCNC: 4.1 G/DL (ref 3.5–5.2)
ALP BLD-CCNC: 111 U/L (ref 35–104)
ALT SERPL-CCNC: 10 U/L (ref 0–32)
ANION GAP SERPL CALCULATED.3IONS-SCNC: 21 MMOL/L (ref 7–16)
AST SERPL-CCNC: 32 U/L (ref 0–31)
BASOPHILS ABSOLUTE: 0.04 E9/L (ref 0–0.2)
BASOPHILS RELATIVE PERCENT: 0.5 % (ref 0–2)
BETA-HYDROXYBUTYRATE: >4.5 MMOL/L (ref 0.02–0.27)
BILIRUB SERPL-MCNC: 0.3 MG/DL (ref 0–1.2)
BILIRUBIN DIRECT: <0.2 MG/DL (ref 0–0.3)
BILIRUBIN URINE: NEGATIVE
BILIRUBIN, INDIRECT: ABNORMAL MG/DL (ref 0–1)
BLOOD, URINE: NEGATIVE
BUN BLDV-MCNC: 20 MG/DL (ref 6–20)
CALCIUM SERPL-MCNC: 9.4 MG/DL (ref 8.6–10.2)
CHLORIDE BLD-SCNC: 87 MMOL/L (ref 98–107)
CHP ED QC CHECK: YES
CLARITY: CLEAR
CO2: 16 MMOL/L (ref 22–29)
COLOR: ABNORMAL
CREAT SERPL-MCNC: 0.8 MG/DL (ref 0.5–1)
EOSINOPHILS ABSOLUTE: 0.04 E9/L (ref 0.05–0.5)
EOSINOPHILS RELATIVE PERCENT: 0.5 % (ref 0–6)
GFR AFRICAN AMERICAN: >60
GFR NON-AFRICAN AMERICAN: >60 ML/MIN/1.73
GLUCOSE BLD-MCNC: 480 MG/DL
GLUCOSE BLD-MCNC: 494 MG/DL
GLUCOSE BLD-MCNC: 520 MG/DL (ref 74–99)
GLUCOSE URINE: >=1000 MG/DL
HCG, URINE, POC: NEGATIVE
HCT VFR BLD CALC: 38.9 % (ref 34–48)
HEMOGLOBIN: 13.3 G/DL (ref 11.5–15.5)
IMMATURE GRANULOCYTES #: 0.02 E9/L
IMMATURE GRANULOCYTES %: 0.3 % (ref 0–5)
KETONES, URINE: 40 MG/DL
LACTIC ACID, SEPSIS: 1 MMOL/L (ref 0.5–1.9)
LACTIC ACID, SEPSIS: 1.3 MMOL/L (ref 0.5–1.9)
LEUKOCYTE ESTERASE, URINE: NEGATIVE
LYMPHOCYTES ABSOLUTE: 2.95 E9/L (ref 1.5–4)
LYMPHOCYTES RELATIVE PERCENT: 37.7 % (ref 20–42)
Lab: NORMAL
MCH RBC QN AUTO: 28.1 PG (ref 26–35)
MCHC RBC AUTO-ENTMCNC: 34.2 % (ref 32–34.5)
MCV RBC AUTO: 82.1 FL (ref 80–99.9)
METER GLUCOSE: 237 MG/DL (ref 74–99)
METER GLUCOSE: 277 MG/DL (ref 74–99)
METER GLUCOSE: 455 MG/DL (ref 74–99)
METER GLUCOSE: 480 MG/DL (ref 74–99)
METER GLUCOSE: 494 MG/DL (ref 74–99)
MONOCYTES ABSOLUTE: 0.39 E9/L (ref 0.1–0.95)
MONOCYTES RELATIVE PERCENT: 5 % (ref 2–12)
NEGATIVE QC PASS/FAIL: NORMAL
NEUTROPHILS ABSOLUTE: 4.39 E9/L (ref 1.8–7.3)
NEUTROPHILS RELATIVE PERCENT: 56 % (ref 43–80)
NITRITE, URINE: NEGATIVE
PDW BLD-RTO: 13.2 FL (ref 11.5–15)
PH UA: 6 (ref 5–9)
PH VENOUS: 7.24 (ref 7.35–7.45)
PLATELET # BLD: 344 E9/L (ref 130–450)
PMV BLD AUTO: 10.9 FL (ref 7–12)
POSITIVE QC PASS/FAIL: NORMAL
POTASSIUM SERPL-SCNC: 5.7 MMOL/L (ref 3.5–5)
PROTEIN UA: NEGATIVE MG/DL
RBC # BLD: 4.74 E12/L (ref 3.5–5.5)
SODIUM BLD-SCNC: 124 MMOL/L (ref 132–146)
SPECIFIC GRAVITY UA: 1.01 (ref 1–1.03)
TOTAL PROTEIN: 8.2 G/DL (ref 6.4–8.3)
UROBILINOGEN, URINE: 0.2 E.U./DL
WBC # BLD: 7.8 E9/L (ref 4.5–11.5)

## 2022-06-24 PROCEDURE — 2580000003 HC RX 258: Performed by: EMERGENCY MEDICINE

## 2022-06-24 PROCEDURE — 6360000002 HC RX W HCPCS

## 2022-06-24 PROCEDURE — 99285 EMERGENCY DEPT VISIT HI MDM: CPT

## 2022-06-24 PROCEDURE — 36415 COLL VENOUS BLD VENIPUNCTURE: CPT

## 2022-06-24 PROCEDURE — 81003 URINALYSIS AUTO W/O SCOPE: CPT

## 2022-06-24 PROCEDURE — 82010 KETONE BODYS QUAN: CPT

## 2022-06-24 PROCEDURE — 82800 BLOOD PH: CPT

## 2022-06-24 PROCEDURE — 99222 1ST HOSP IP/OBS MODERATE 55: CPT | Performed by: INTERNAL MEDICINE

## 2022-06-24 PROCEDURE — 2000000000 HC ICU R&B

## 2022-06-24 PROCEDURE — 6360000002 HC RX W HCPCS: Performed by: INTERNAL MEDICINE

## 2022-06-24 PROCEDURE — 6370000000 HC RX 637 (ALT 250 FOR IP): Performed by: EMERGENCY MEDICINE

## 2022-06-24 PROCEDURE — 83605 ASSAY OF LACTIC ACID: CPT

## 2022-06-24 PROCEDURE — 80048 BASIC METABOLIC PNL TOTAL CA: CPT

## 2022-06-24 PROCEDURE — 87081 CULTURE SCREEN ONLY: CPT

## 2022-06-24 PROCEDURE — 2580000003 HC RX 258: Performed by: INTERNAL MEDICINE

## 2022-06-24 PROCEDURE — 80076 HEPATIC FUNCTION PANEL: CPT

## 2022-06-24 PROCEDURE — 82962 GLUCOSE BLOOD TEST: CPT

## 2022-06-24 PROCEDURE — 85025 COMPLETE CBC W/AUTO DIFF WBC: CPT

## 2022-06-24 RX ORDER — MAGNESIUM SULFATE 1 G/100ML
1000 INJECTION INTRAVENOUS PRN
Status: DISCONTINUED | OUTPATIENT
Start: 2022-06-24 | End: 2022-06-25

## 2022-06-24 RX ORDER — FENTANYL CITRATE 50 UG/ML
25 INJECTION, SOLUTION INTRAMUSCULAR; INTRAVENOUS ONCE
Status: COMPLETED | OUTPATIENT
Start: 2022-06-24 | End: 2022-06-24

## 2022-06-24 RX ORDER — SODIUM CHLORIDE 9 MG/ML
INJECTION, SOLUTION INTRAVENOUS CONTINUOUS
Status: DISCONTINUED | OUTPATIENT
Start: 2022-06-24 | End: 2022-06-25

## 2022-06-24 RX ORDER — POTASSIUM CHLORIDE 7.45 MG/ML
10 INJECTION INTRAVENOUS PRN
Status: DISCONTINUED | OUTPATIENT
Start: 2022-06-24 | End: 2022-06-25

## 2022-06-24 RX ORDER — 0.9 % SODIUM CHLORIDE 0.9 %
15 INTRAVENOUS SOLUTION INTRAVENOUS ONCE
Status: COMPLETED | OUTPATIENT
Start: 2022-06-24 | End: 2022-06-24

## 2022-06-24 RX ORDER — ENOXAPARIN SODIUM 100 MG/ML
40 INJECTION SUBCUTANEOUS DAILY
Status: DISCONTINUED | OUTPATIENT
Start: 2022-06-25 | End: 2022-06-25 | Stop reason: HOSPADM

## 2022-06-24 RX ORDER — POLYETHYLENE GLYCOL 3350 17 G/17G
17 POWDER, FOR SOLUTION ORAL DAILY PRN
Status: DISCONTINUED | OUTPATIENT
Start: 2022-06-24 | End: 2022-06-25 | Stop reason: HOSPADM

## 2022-06-24 RX ORDER — DEXTROSE AND SODIUM CHLORIDE 5; .45 G/100ML; G/100ML
INJECTION, SOLUTION INTRAVENOUS CONTINUOUS PRN
Status: DISCONTINUED | OUTPATIENT
Start: 2022-06-24 | End: 2022-06-25

## 2022-06-24 RX ORDER — 0.9 % SODIUM CHLORIDE 0.9 %
1000 INTRAVENOUS SOLUTION INTRAVENOUS ONCE
Status: COMPLETED | OUTPATIENT
Start: 2022-06-24 | End: 2022-06-24

## 2022-06-24 RX ORDER — SODIUM CHLORIDE 0.9 % (FLUSH) 0.9 %
10 SYRINGE (ML) INJECTION PRN
Status: DISCONTINUED | OUTPATIENT
Start: 2022-06-24 | End: 2022-06-25 | Stop reason: HOSPADM

## 2022-06-24 RX ORDER — 0.9 % SODIUM CHLORIDE 0.9 %
1000 INTRAVENOUS SOLUTION INTRAVENOUS ONCE
Status: DISCONTINUED | OUTPATIENT
Start: 2022-06-24 | End: 2022-06-24

## 2022-06-24 RX ORDER — 0.9 % SODIUM CHLORIDE 0.9 %
15 INTRAVENOUS SOLUTION INTRAVENOUS ONCE
Status: DISCONTINUED | OUTPATIENT
Start: 2022-06-24 | End: 2022-06-25

## 2022-06-24 RX ORDER — ONDANSETRON 2 MG/ML
4 INJECTION INTRAMUSCULAR; INTRAVENOUS EVERY 6 HOURS PRN
Status: DISCONTINUED | OUTPATIENT
Start: 2022-06-24 | End: 2022-06-25 | Stop reason: HOSPADM

## 2022-06-24 RX ORDER — MORPHINE SULFATE 2 MG/ML
2 INJECTION, SOLUTION INTRAMUSCULAR; INTRAVENOUS EVERY 4 HOURS PRN
Status: DISCONTINUED | OUTPATIENT
Start: 2022-06-24 | End: 2022-06-25 | Stop reason: HOSPADM

## 2022-06-24 RX ORDER — SODIUM CHLORIDE 9 MG/ML
INJECTION, SOLUTION INTRAVENOUS CONTINUOUS
Status: DISCONTINUED | OUTPATIENT
Start: 2022-06-24 | End: 2022-06-24

## 2022-06-24 RX ADMIN — SODIUM CHLORIDE: 9 INJECTION, SOLUTION INTRAVENOUS at 21:12

## 2022-06-24 RX ADMIN — SODIUM CHLORIDE: 9 INJECTION, SOLUTION INTRAVENOUS at 22:01

## 2022-06-24 RX ADMIN — DEXTROSE AND SODIUM CHLORIDE: 5; 450 INJECTION, SOLUTION INTRAVENOUS at 22:56

## 2022-06-24 RX ADMIN — FENTANYL CITRATE 25 MCG: 50 INJECTION, SOLUTION INTRAMUSCULAR; INTRAVENOUS at 20:17

## 2022-06-24 RX ADMIN — SODIUM CHLORIDE: 9 INJECTION, SOLUTION INTRAVENOUS at 20:56

## 2022-06-24 RX ADMIN — SODIUM CHLORIDE 1000 ML: 9 INJECTION, SOLUTION INTRAVENOUS at 18:17

## 2022-06-24 RX ADMIN — MORPHINE SULFATE 2 MG: 2 INJECTION, SOLUTION INTRAMUSCULAR; INTRAVENOUS at 23:51

## 2022-06-24 RX ADMIN — SODIUM CHLORIDE 749 ML: 9 INJECTION, SOLUTION INTRAVENOUS at 19:54

## 2022-06-24 RX ADMIN — SODIUM CHLORIDE 0.1 UNITS/KG/HR: 9 INJECTION, SOLUTION INTRAVENOUS at 19:59

## 2022-06-24 ASSESSMENT — PAIN DESCRIPTION - DESCRIPTORS
DESCRIPTORS: SHARP;DISCOMFORT;STABBING
DESCRIPTORS: DISCOMFORT;ACHING

## 2022-06-24 ASSESSMENT — PAIN DESCRIPTION - LOCATION
LOCATION: BACK
LOCATION: BACK

## 2022-06-24 ASSESSMENT — ENCOUNTER SYMPTOMS
VOICE CHANGE: 0
WHEEZING: 0
ABDOMINAL PAIN: 0
DIARRHEA: 0
SORE THROAT: 1
NAUSEA: 0
PHOTOPHOBIA: 0
SHORTNESS OF BREATH: 0
TROUBLE SWALLOWING: 0
VOMITING: 0

## 2022-06-24 ASSESSMENT — PAIN DESCRIPTION - ORIENTATION
ORIENTATION: MID;LOWER
ORIENTATION: MID;LOWER

## 2022-06-24 ASSESSMENT — PAIN SCALES - GENERAL
PAINLEVEL_OUTOF10: 7
PAINLEVEL_OUTOF10: 3

## 2022-06-24 NOTE — ED NOTES
This RN received report from Lashaun Joya [place].      Maciej Sanchez RN  06/24/22 2421

## 2022-06-25 VITALS
SYSTOLIC BLOOD PRESSURE: 123 MMHG | DIASTOLIC BLOOD PRESSURE: 78 MMHG | TEMPERATURE: 98.4 F | BODY MASS INDEX: 20.77 KG/M2 | OXYGEN SATURATION: 100 % | WEIGHT: 110 LBS | HEART RATE: 106 BPM | HEIGHT: 61 IN | RESPIRATION RATE: 16 BRPM

## 2022-06-25 LAB
ADENOVIRUS BY PCR: NOT DETECTED
ALBUMIN SERPL-MCNC: 3.2 G/DL (ref 3.5–5.2)
ALP BLD-CCNC: 75 U/L (ref 35–104)
ALT SERPL-CCNC: 13 U/L (ref 0–32)
ANION GAP SERPL CALCULATED.3IONS-SCNC: 11 MMOL/L (ref 7–16)
ANION GAP SERPL CALCULATED.3IONS-SCNC: 14 MMOL/L (ref 7–16)
ANION GAP SERPL CALCULATED.3IONS-SCNC: 15 MMOL/L (ref 7–16)
AST SERPL-CCNC: 20 U/L (ref 0–31)
BASOPHILS ABSOLUTE: 0.03 E9/L (ref 0–0.2)
BASOPHILS ABSOLUTE: 0.04 E9/L (ref 0–0.2)
BASOPHILS RELATIVE PERCENT: 0.4 % (ref 0–2)
BASOPHILS RELATIVE PERCENT: 0.5 % (ref 0–2)
BILIRUB SERPL-MCNC: <0.2 MG/DL (ref 0–1.2)
BORDETELLA PARAPERTUSSIS BY PCR: NOT DETECTED
BORDETELLA PERTUSSIS BY PCR: NOT DETECTED
BUN BLDV-MCNC: 11 MG/DL (ref 6–20)
BUN BLDV-MCNC: 18 MG/DL (ref 6–20)
BUN BLDV-MCNC: 22 MG/DL (ref 6–20)
CALCIUM SERPL-MCNC: 7.8 MG/DL (ref 8.6–10.2)
CALCIUM SERPL-MCNC: 8.1 MG/DL (ref 8.6–10.2)
CALCIUM SERPL-MCNC: 8.2 MG/DL (ref 8.6–10.2)
CHLAMYDOPHILIA PNEUMONIAE BY PCR: NOT DETECTED
CHLORIDE BLD-SCNC: 100 MMOL/L (ref 98–107)
CHLORIDE BLD-SCNC: 101 MMOL/L (ref 98–107)
CHLORIDE BLD-SCNC: 109 MMOL/L (ref 98–107)
CO2: 16 MMOL/L (ref 22–29)
CO2: 16 MMOL/L (ref 22–29)
CO2: 17 MMOL/L (ref 22–29)
CORONAVIRUS 229E BY PCR: NOT DETECTED
CORONAVIRUS HKU1 BY PCR: NOT DETECTED
CORONAVIRUS NL63 BY PCR: NOT DETECTED
CORONAVIRUS OC43 BY PCR: NOT DETECTED
CREAT SERPL-MCNC: 0.4 MG/DL (ref 0.5–1)
CREAT SERPL-MCNC: 0.5 MG/DL (ref 0.5–1)
CREAT SERPL-MCNC: 0.6 MG/DL (ref 0.5–1)
EOSINOPHILS ABSOLUTE: 0.05 E9/L (ref 0.05–0.5)
EOSINOPHILS ABSOLUTE: 0.07 E9/L (ref 0.05–0.5)
EOSINOPHILS RELATIVE PERCENT: 0.6 % (ref 0–6)
EOSINOPHILS RELATIVE PERCENT: 1 % (ref 0–6)
GFR AFRICAN AMERICAN: >60
GFR NON-AFRICAN AMERICAN: >60 ML/MIN/1.73
GLUCOSE BLD-MCNC: 101 MG/DL (ref 74–99)
GLUCOSE BLD-MCNC: 189 MG/DL (ref 74–99)
GLUCOSE BLD-MCNC: 382 MG/DL (ref 74–99)
HCT VFR BLD CALC: 31.7 % (ref 34–48)
HCT VFR BLD CALC: 35.7 % (ref 34–48)
HEMOGLOBIN: 10.9 G/DL (ref 11.5–15.5)
HEMOGLOBIN: 12 G/DL (ref 11.5–15.5)
HUMAN METAPNEUMOVIRUS BY PCR: NOT DETECTED
HUMAN RHINOVIRUS/ENTEROVIRUS BY PCR: NOT DETECTED
IMMATURE GRANULOCYTES #: 0.02 E9/L
IMMATURE GRANULOCYTES #: 0.02 E9/L
IMMATURE GRANULOCYTES %: 0.3 % (ref 0–5)
IMMATURE GRANULOCYTES %: 0.3 % (ref 0–5)
INFLUENZA A BY PCR: NOT DETECTED
INFLUENZA B BY PCR: NOT DETECTED
LYMPHOCYTES ABSOLUTE: 3.34 E9/L (ref 1.5–4)
LYMPHOCYTES ABSOLUTE: 3.37 E9/L (ref 1.5–4)
LYMPHOCYTES RELATIVE PERCENT: 43 % (ref 20–42)
LYMPHOCYTES RELATIVE PERCENT: 46.8 % (ref 20–42)
MAGNESIUM: 1.7 MG/DL (ref 1.6–2.6)
MCH RBC QN AUTO: 27.8 PG (ref 26–35)
MCH RBC QN AUTO: 28.1 PG (ref 26–35)
MCHC RBC AUTO-ENTMCNC: 33.6 % (ref 32–34.5)
MCHC RBC AUTO-ENTMCNC: 34.4 % (ref 32–34.5)
MCV RBC AUTO: 81.7 FL (ref 80–99.9)
MCV RBC AUTO: 82.8 FL (ref 80–99.9)
METER GLUCOSE: 133 MG/DL (ref 74–99)
METER GLUCOSE: 151 MG/DL (ref 74–99)
METER GLUCOSE: 171 MG/DL (ref 74–99)
METER GLUCOSE: 425 MG/DL (ref 74–99)
MONOCYTES ABSOLUTE: 0.34 E9/L (ref 0.1–0.95)
MONOCYTES ABSOLUTE: 0.4 E9/L (ref 0.1–0.95)
MONOCYTES RELATIVE PERCENT: 4.7 % (ref 2–12)
MONOCYTES RELATIVE PERCENT: 5.2 % (ref 2–12)
MYCOPLASMA PNEUMONIAE BY PCR: NOT DETECTED
NEUTROPHILS ABSOLUTE: 3.37 E9/L (ref 1.8–7.3)
NEUTROPHILS ABSOLUTE: 3.91 E9/L (ref 1.8–7.3)
NEUTROPHILS RELATIVE PERCENT: 46.8 % (ref 43–80)
NEUTROPHILS RELATIVE PERCENT: 50.4 % (ref 43–80)
PARAINFLUENZA VIRUS 1 BY PCR: NOT DETECTED
PARAINFLUENZA VIRUS 2 BY PCR: NOT DETECTED
PARAINFLUENZA VIRUS 3 BY PCR: NOT DETECTED
PARAINFLUENZA VIRUS 4 BY PCR: NOT DETECTED
PDW BLD-RTO: 13.1 FL (ref 11.5–15)
PDW BLD-RTO: 13.2 FL (ref 11.5–15)
PHOSPHORUS: 3.2 MG/DL (ref 2.5–4.5)
PLATELET # BLD: 270 E9/L (ref 130–450)
PLATELET # BLD: 295 E9/L (ref 130–450)
PMV BLD AUTO: 10.5 FL (ref 7–12)
PMV BLD AUTO: 10.8 FL (ref 7–12)
POTASSIUM SERPL-SCNC: 3.2 MMOL/L (ref 3.5–5)
POTASSIUM SERPL-SCNC: 3.7 MMOL/L (ref 3.5–5)
POTASSIUM SERPL-SCNC: 4.5 MMOL/L (ref 3.5–5)
RBC # BLD: 3.88 E12/L (ref 3.5–5.5)
RBC # BLD: 4.31 E12/L (ref 3.5–5.5)
RESPIRATORY SYNCYTIAL VIRUS BY PCR: NOT DETECTED
SARS-COV-2, PCR: NOT DETECTED
SODIUM BLD-SCNC: 130 MMOL/L (ref 132–146)
SODIUM BLD-SCNC: 133 MMOL/L (ref 132–146)
SODIUM BLD-SCNC: 136 MMOL/L (ref 132–146)
TOTAL PROTEIN: 5.8 G/DL (ref 6.4–8.3)
WBC # BLD: 7.2 E9/L (ref 4.5–11.5)
WBC # BLD: 7.8 E9/L (ref 4.5–11.5)

## 2022-06-25 PROCEDURE — 6370000000 HC RX 637 (ALT 250 FOR IP): Performed by: INTERNAL MEDICINE

## 2022-06-25 PROCEDURE — 36415 COLL VENOUS BLD VENIPUNCTURE: CPT

## 2022-06-25 PROCEDURE — 80048 BASIC METABOLIC PNL TOTAL CA: CPT

## 2022-06-25 PROCEDURE — 82962 GLUCOSE BLOOD TEST: CPT

## 2022-06-25 PROCEDURE — 84100 ASSAY OF PHOSPHORUS: CPT

## 2022-06-25 PROCEDURE — 99239 HOSP IP/OBS DSCHRG MGMT >30: CPT | Performed by: FAMILY MEDICINE

## 2022-06-25 PROCEDURE — 80053 COMPREHEN METABOLIC PANEL: CPT

## 2022-06-25 PROCEDURE — 6360000002 HC RX W HCPCS: Performed by: INTERNAL MEDICINE

## 2022-06-25 PROCEDURE — 85025 COMPLETE CBC W/AUTO DIFF WBC: CPT

## 2022-06-25 PROCEDURE — 0202U NFCT DS 22 TRGT SARS-COV-2: CPT

## 2022-06-25 PROCEDURE — 83735 ASSAY OF MAGNESIUM: CPT

## 2022-06-25 RX ORDER — INSULIN GLARGINE 100 [IU]/ML
20 INJECTION, SOLUTION SUBCUTANEOUS ONCE
Status: COMPLETED | OUTPATIENT
Start: 2022-06-25 | End: 2022-06-25

## 2022-06-25 RX ORDER — POTASSIUM CHLORIDE 20 MEQ/1
40 TABLET, EXTENDED RELEASE ORAL ONCE
Status: COMPLETED | OUTPATIENT
Start: 2022-06-25 | End: 2022-06-25

## 2022-06-25 RX ORDER — INSULIN LISPRO 100 [IU]/ML
0-9 INJECTION, SOLUTION INTRAVENOUS; SUBCUTANEOUS NIGHTLY
Status: DISCONTINUED | OUTPATIENT
Start: 2022-06-25 | End: 2022-06-25 | Stop reason: HOSPADM

## 2022-06-25 RX ORDER — INSULIN GLARGINE 100 [IU]/ML
20 INJECTION, SOLUTION SUBCUTANEOUS DAILY
Status: DISCONTINUED | OUTPATIENT
Start: 2022-06-25 | End: 2022-06-25 | Stop reason: HOSPADM

## 2022-06-25 RX ORDER — INSULIN LISPRO 100 [IU]/ML
0-6 INJECTION, SOLUTION INTRAVENOUS; SUBCUTANEOUS NIGHTLY
Status: DISCONTINUED | OUTPATIENT
Start: 2022-06-25 | End: 2022-06-25

## 2022-06-25 RX ORDER — INSULIN LISPRO 100 [IU]/ML
0-18 INJECTION, SOLUTION INTRAVENOUS; SUBCUTANEOUS
Status: DISCONTINUED | OUTPATIENT
Start: 2022-06-25 | End: 2022-06-25 | Stop reason: HOSPADM

## 2022-06-25 RX ORDER — INSULIN LISPRO 100 [IU]/ML
0-12 INJECTION, SOLUTION INTRAVENOUS; SUBCUTANEOUS
Status: DISCONTINUED | OUTPATIENT
Start: 2022-06-25 | End: 2022-06-25

## 2022-06-25 RX ORDER — DEXTROSE MONOHYDRATE 50 MG/ML
100 INJECTION, SOLUTION INTRAVENOUS PRN
Status: DISCONTINUED | OUTPATIENT
Start: 2022-06-25 | End: 2022-06-25 | Stop reason: HOSPADM

## 2022-06-25 RX ADMIN — INSULIN LISPRO 18 UNITS: 100 INJECTION, SOLUTION INTRAVENOUS; SUBCUTANEOUS at 07:26

## 2022-06-25 RX ADMIN — MORPHINE SULFATE 2 MG: 2 INJECTION, SOLUTION INTRAMUSCULAR; INTRAVENOUS at 08:05

## 2022-06-25 RX ADMIN — INSULIN GLARGINE 20 UNITS: 100 INJECTION, SOLUTION SUBCUTANEOUS at 01:31

## 2022-06-25 RX ADMIN — POTASSIUM CHLORIDE 10 MEQ: 7.46 INJECTION, SOLUTION INTRAVENOUS at 00:42

## 2022-06-25 RX ADMIN — INSULIN GLARGINE 20 UNITS: 100 INJECTION, SOLUTION SUBCUTANEOUS at 09:10

## 2022-06-25 RX ADMIN — POTASSIUM CHLORIDE 40 MEQ: 1500 TABLET, EXTENDED RELEASE ORAL at 01:30

## 2022-06-25 RX ADMIN — MORPHINE SULFATE 2 MG: 2 INJECTION, SOLUTION INTRAMUSCULAR; INTRAVENOUS at 04:05

## 2022-06-25 ASSESSMENT — ENCOUNTER SYMPTOMS
COLOR CHANGE: 0
NAUSEA: 0
TROUBLE SWALLOWING: 0
ABDOMINAL PAIN: 0
SORE THROAT: 0
CONSTIPATION: 0
SHORTNESS OF BREATH: 0
VOMITING: 0
COUGH: 0
DIARRHEA: 0

## 2022-06-25 ASSESSMENT — PAIN DESCRIPTION - LOCATION
LOCATION: BACK
LOCATION: BACK

## 2022-06-25 ASSESSMENT — PAIN DESCRIPTION - PAIN TYPE: TYPE: ACUTE PAIN

## 2022-06-25 ASSESSMENT — PAIN SCALES - GENERAL
PAINLEVEL_OUTOF10: 5
PAINLEVEL_OUTOF10: 7

## 2022-06-25 ASSESSMENT — PAIN DESCRIPTION - ORIENTATION: ORIENTATION: MID

## 2022-06-25 NOTE — CONSULTS
Critical Care Admit/Consult Note         Patient - Maegan Nicolas   MRN -  [id number]   Leihgann [place]wood # - [de-identified]   - 1995      Date of Admission -  2022  5:23 PM  Date of evaluation -  2022  0210/0210-A   Hospital Day - 1            ADMIT/CONSULT DETAILS     Reason for Admit/Consult   DKA    Consulting Service/Physician   Consulting - Shea Lopez DO  Primary Care Physician - Ira Castro DO         HPI   The patient is a 27 y.o. female with significant past medical history of Type 1 DM who presented with about 2 day hx of N/V. She knew that her blood glucose was elevated but did not know how to adjust insulin and unable to reach Endocrinologist. She did her usual dosage of Lantus 22U in the AM and her sliding scale dose but was unable to lower her blood glucose. She states that she has a cold-like illness the past few days. She is feeling much better today. Denies CP, SOB, N/V, abdominal pain. Has tolerated her meals at night and this morning. Presented to ED day prior but left prior to admission for DKA. On this presentation presents with worsening symptoms. On arrival to the ED presented tachycardia at 112. Labs significant for glucose 494, b-hydroxybutyrate >4.5, AG 21, ketonuria.        Past Medical History         Diagnosis Date    Bleeding at insertion site 2018    Common femoral artery injury, right, initial encounter 2018    Depressive disorder     Diabetic ketoacidosis ([address]     DM type 1 (diabetes mellitus, type 1) ([address]     Marijuana abuse     Non-compliance     Trichimoniasis 2021        Past Surgical History           Procedure Laterality Date    ABDOMEN SURGERY N/A 2019    INCISION AND DRAINAGE OF SUPRAPUBIC ABSESS performed by Jp North MD at [address] [place] [address]      last yr       Social History     Socioeconomic History    Marital status: Single     Spouse name: Not on file    Number of children: Not on file Years of education: Not on file    Highest education level: Not on file   Occupational History    Not on file   Tobacco Use    Smoking status: Never Smoker    Smokeless tobacco: Never Used   Vaping Use    Vaping Use: Never used   Substance and Sexual Activity    Alcohol use: No    Drug use: Not Currently     Types: Marijuana Estil Catena)     Comment: Recreationally for Pain PRN    Sexual activity: Yes     Partners: Male   Other Topics Concern    Not on file   Social History Narrative    Not on file     Social Determinants of Health     Financial Resource Strain: Low Risk     Difficulty of Paying Living Expenses: Not hard at all   Food Insecurity: No Food Insecurity    Worried About [address] in the Last Year: Never true    [address] [employer] in the Last Year: Never true   Transportation Needs:     Lack of Transportation (Medical): Not on file    Lack of Transportation (Non-Medical):  Not on file   Physical Activity:     Days of Exercise per Week: Not on file    Minutes of Exercise per Session: Not on file   Stress:     Feeling of Stress : Not on file   Social Connections:     Frequency of Communication with Friends and Family: Not on file    Frequency of Social Gatherings with Friends and Family: Not on file    Attends [employer]ism Services: Not on file    Active Member of Clubs or Organizations: Not on file    Attends Club or Organization Meetings: Not on file    Marital Status: Not on file   Intimate Partner Violence:     Fear of Current or Ex-Partner: Not on file    Emotionally Abused: Not on file    Physically Abused: Not on file    Sexually Abused: Not on file   Housing Stability:     Unable to Pay for Housing in the Last Year: Not on file    Number of [place] in the Last Year: Not on file    Unstable Housing in the Last Year: Not on file         Current Medications   Current Medications    insulin lispro  0-12 Units SubCUTAneous TID WC    insulin lispro  0-6 Units SubCUTAneous Nightly    enoxaparin  40 mg SubCUTAneous Daily    sodium chloride  15 mL/kg IntraVENous Once     sodium chloride flush, dextrose bolus **OR** dextrose bolus, polyethylene glycol, ondansetron, morphine  IV Drips/Infusions     Home Medications  Medications Prior to Admission: glucose 4g chewable tablet, Take 4 tablets by mouth as needed for Low blood sugar  Insulin Pen Needle (BD PEN NEEDLE SHELBIE U/F) 32G X 4 MM MISC, Uses with insulin 4 times a day  insulin glargine (LANTUS SOLOSTAR) 100 UNIT/ML injection pen, Inject 22 Units into the skin every morning  insulin lispro, 1 Unit Dial, (HUMALOG KWIKPEN) 100 UNIT/ML SOPN, Inject 1 units per 10 grams of carbs + following sliding scale. -200 add 2U, -250 add 4U, -300 add 6U, -350 add 8U, -400 add 12U, BS over 400 add 12U  naproxen (NAPROSYN) 500 MG tablet, Take 1 tablet by mouth 2 times daily as needed for Pain  melatonin (RA MELATONIN) 3 MG TABS tablet, Take 2 tablets by mouth daily  acetaminophen (TYLENOL) 500 MG tablet, Take 1 tablet by mouth every 4 hours as needed for Pain or Fever    Diet/Nutrition   ADULT DIET; Regular; 4 carb choices (60 gm/meal)    Allergies   Patient has no known allergies. Social History   Tobacco   reports that she has never smoked. She has never used smokeless tobacco.    Alcohol     reports no history of alcohol use. Occupational history :    Family History         Problem Relation Age of Onset    Diabetes Maternal Grandmother     Diabetes Paternal Grandmother     Stroke Other     Thyroid Disease Neg Hx        Sleep History   n/a    ROS     REVIEW OF SYSTEMS:  Review of Systems   Constitutional: Negative for appetite change and fatigue. HENT: Negative for sore throat and trouble swallowing. Respiratory: Negative for cough and shortness of breath. Cardiovascular: Negative for chest pain, palpitations and leg swelling. Gastrointestinal: Negative for abdominal pain, constipation, diarrhea, nausea and vomiting.    Genitourinary: Negative for difficulty urinating and dysuria. Musculoskeletal: Negative for arthralgias and myalgias. Skin: Negative for color change and rash. Neurological: Negative for dizziness and headaches. Psychiatric/Behavioral: Negative for confusion and decreased concentration. Lines and Devices   None    Mechanical Ventilation Data   VENT SETTINGS (Comprehensive)     Additional Respiratory Assessments  Heart Rate: (!) 107  Resp: 14  SpO2: 98 %    ABG  Lab Results   Component Value Date    PH 7.282 11/10/2020    PCO2 47.5 11/10/2020    PO2 20.8 11/10/2020    HCO3 21.9 11/10/2020    O2SAT 26.5 11/10/2020     Lab Results   Component Value Date    MODE RA 11/10/2020           Vitals    height is 5' 1\" (1.549 m) and weight is 110 lb (49.9 kg). Her oral temperature is 98 °F (36.7 °C). Her blood pressure is 136/93 (abnormal) and her pulse is 107 (abnormal). Her respiration is 14 and oxygen saturation is 98%. Temperature Range: Temp: 98 °F (36.7 °C) Temp  Av.1 °F (36.7 °C)  Min: 97.9 °F (36.6 °C)  Max: 98.4 °F (36.9 °C)  BP Range:  Systolic (43UKP), QHJ:466 , Min:112 , DMC:269     Diastolic (69QUF), AJP:96, Min:71, Max:103    Pulse Range: Pulse  Av.5  Min: 101  Max: 112  Respiration Range: Resp  Av.9  Min: 12  Max: 16  Current Pulse Ox[de-identified]  SpO2: 98 %  24HR Pulse Ox Range:  SpO2  Av.4 %  Min: 98 %  Max: 100 %  Oxygen Amount and Delivery:        I/O (24 Hours)    Patient Vitals for the past 8 hrs:   BP Temp Temp src Pulse Resp SpO2   22 0200 (!) 136/93 -- -- (!) 107 14 --   22 0100 (!) 131/94 -- -- (!) 103 14 --   22 0000 112/81 98 °F (36.7 °C) Oral (!) 104 16 98 %       Intake/Output Summary (Last 24 hours) at 2022 0700  Last data filed at 2022 0417  Gross per 24 hour   Intake 1630.29 ml   Output --   Net 1630.29 ml     No intake/output data recorded.    Date 22 0000 - 22 8116   Shift 3954-9062 3009-3495 2826-2840 24 Hour Total   INTAKE   I.V.(mL/kg) 843.4(16.9)   843. 4(16.9)   IV Piggyback(mL/kg) 786. 9(15.8)   786. 9(15.8)   Shift Total(mL/kg) 1630. 3(32.7)   1630. 3(32.7)   OUTPUT   Shift Total(mL/kg)       Weight (kg) 49.9 49.9 49.9 49.9     Patient Vitals for the past 96 hrs (Last 3 readings):   Weight   06/24/22 2050 110 lb (49.9 kg)   06/24/22 1723 110 lb (49.9 kg)         Drains/Tubes Outputs  Exam       PHYSICAL EXAM:  CONSTITUTIONAL:  awake, alert, cooperative, no apparent distress, and appears stated age  EYES:  Lids and lashes normal, pupils equal, round and reactive to light, extra ocular muscles intact, sclera clear, conjunctiva normal  LUNGS:  No increased work of breathing, good air exchange, clear to auscultation bilaterally, no crackles or wheezing  CARDIOVASCULAR:  Normal apical impulse, regular rate and rhythm, normal S1 and S2, no S3 or S4, and no murmur noted  ABDOMEN:  No scars, normal bowel sounds, soft, non-distended, non-tender, no masses palpated, no hepatosplenomegally  MUSCULOSKELETAL:  there is no redness, warmth, or swelling of the joints  NEUROLOGIC:  CN 2-12 grossly intact  SKIN:  no bruising or bleeding, normal skin color, texture, turgor and no redness, warmth, or swelling    Data   Old records and images have been reviewed    Lab Results   CBC     Lab Results   Component Value Date    WBC 7.2 06/25/2022    RBC 3.88 06/25/2022    HGB 10.9 06/25/2022    HCT 31.7 06/25/2022     06/25/2022    MCV 81.7 06/25/2022    MCH 28.1 06/25/2022    MCHC 34.4 06/25/2022    RDW 13.1 06/25/2022    NRBC 0.0 05/10/2019    SEGSPCT 58 09/29/2013    METASPCT 1.0 04/29/2020    LYMPHOPCT 46.8 06/25/2022    MONOPCT 4.7 06/25/2022    MYELOPCT 1.0 04/29/2020    BASOPCT 0.4 06/25/2022    MONOSABS 0.34 06/25/2022    LYMPHSABS 3.37 06/25/2022    EOSABS 0.07 06/25/2022    BASOSABS 0.03 06/25/2022       BMP   Lab Results   Component Value Date     06/25/2022    K 4.5 06/25/2022    K 4.6 06/23/2022     06/25/2022    CO2 16 06/25/2022    BUN 18 06/25/2022    CREATININE 0.5 06/25/2022    GLUCOSE 382 06/25/2022    CALCIUM 8.1 06/25/2022       LFTS  Lab Results   Component Value Date    ALKPHOS 111 06/24/2022    ALT 10 06/24/2022    AST 32 06/24/2022    PROT 8.2 06/24/2022    BILITOT 0.3 06/24/2022    BILIDIR <0.2 06/24/2022    IBILI see below 06/24/2022    LABALBU 4.1 06/24/2022       INR  No results for input(s): PROTIME, INR in the last 72 hours. APTT  No results for input(s): APTT in the last 72 hours. Lactic Acid  Lab Results   Component Value Date    LACTA 2.2 06/05/2022    LACTA 1.8 04/24/2022    LACTA 1.3 04/02/2022        BNP   No results for input(s): BNP in the last 72 hours. Cultures     No results for input(s): BC in the last 72 hours. No results for input(s): [place] in the last 72 hours. No results for input(s): LABURIN in the last 72 hours. Radiology   CXR  None    CT Scans  None    SYSTEMS ASSESSMENT    Neuro   - AAOx3    Respiratory   - On room air    Cardiovascular   - No acute problems     Gastrointestinal   - No acute problems  - Tolerating diet well    Renal   - No acute problems     Infectious Disease   - Respiratory panel ordererd    Hematology/Oncology   - DVT ppx- Lovenox    Endocrine   - DKA  - DKA protocol  - Has been bridged to subq insulin from insulin drip overnight  - F/U with Endocrinology  - Lantus 20U  - HDSSI    Social/Spiritual/DNR/Other   Code status: Full  Diet: Carb control  DVT prophylaxis: Lovenox  Consultations needed: Yes  Transfer out of ICU today? Yes      \"Thank you for asking us to see this complex patient. \"      I personally saw, examined and provided care for the patient. Radiographs, labs and medication list were reviewed by me independently. I spoke with bedside nursing, therapists and consultants. Critical care services and times documented are independent of procedures and multidisciplinary rounds with Residents.  Additionally comprehensive, multidisciplinary rounds were conducted with the MICU team. The case was discussed in detail and plans for care were established. Review of Residents documentation was conducted and revisions were made as appropriate. I agree with the above documented exam, problem list and plan of care.    Siva Olmstead MD   CCT excluding procedures :35'

## 2022-06-25 NOTE — PROGRESS NOTES
Patient admitted from ER 17 to 210, with the following belongings phone and  , purse, wallet $5 and a credit card, clothes, shoes, , placed on monitor, patient oriented to room and unit visiting hours. Patient guide at bedside, reviewed patient rights and responsibilities. MRSA nasal swab obtained. Bed alarm on. Call light within reach. Rajni Nguyen RN

## 2022-06-25 NOTE — PATIENT CARE CONFERENCE
Intensive Care Daily Quality Rounding Checklist      ICU Team Members:  Dr. Jack Chapman, Dr. Grant Angelo (resident), clinical pharmacist, respiratory therapist, charge nurse, bedside nurse    ICU Day #: NUMBER: 2    Intubation Date: N/A    Ventilator Day #: N/A    Central Line Insertion Date: N/A        Day #: N/A     Arterial Line Insertion Date: N/A      Day #: N/A    Temporary Hemodialysis Catheter Insertion Date: N/A      Day # N/A    DVT Prophylaxis: Lovenox    GI Prophylaxis: on PO diet    Bey Catheter Insertion Date: N/A       Day #: N/A      Continued need (if yes, reason documented and discussed with physician): N/A    Skin Issues/ Wounds and ordered treatment discussed on rounds: N/A    Goals/ Plans for the Day: Daily labs, transfer vs discharge

## 2022-06-25 NOTE — H&P
[facility] History and Physical    Perpetual assessment: 59-year-old female past medical history of type 1 diabetes presents with nausea and vomiting. Assessment and plan:    Diabetic ketoacidosis  -Suspect secondary to recent viral illness  -Known history of type 1 diabetes  -Patient was unaware how to adjust her insulin levels  -We will continue diabetic ketoacidosis protocol  -Continue IV fluid hydration next infusion  -Continue every 4 chemistry  -Transition to subcu when gap closed  -Dr Davion Joya to see in am    Code Status: Full  DVT prophylaxis: Lovenox    CHIEF COMPLAINT: Nausea and vomiting    History of Present Illness: This is a 27 female who presents to [facility] above-stated complaint. All history has been taken from the patient and review of medical records. Patient reports that she was not feeling well over the past few days. Reports that she has been having cold-like illness the past few days. She is also blood glucose was getting higher but did not know how to adjust her insulin. She did attempt to call her endocrinologist but was not able to get in touch with him. Subsequently she came in last night with diabetic ketoacidosis but did not want to stay. She returns today with continued worsening disease process. Because that she was admitted for further evaluation treatment.     Informant(s) for H&P:    REVIEW OF SYSTEMS:  A comprehensive review of systems was negative except for: what is in the HPI      PMH:  Past Medical History:   Diagnosis Date    Bleeding at insertion site 01/05/2018    Common femoral artery injury, right, initial encounter 01/05/2018    Depressive disorder     Diabetic ketoacidosis ([address]     DM type 1 (diabetes mellitus, type 1) ([address]     Marijuana abuse     Non-compliance     Trichimoniasis 11/19/2021       Surgical History:  Past Surgical History:   Procedure Laterality Date    ABDOMEN SURGERY N/A 5/9/2019    INCISION AND DRAINAGE OF SUPRAPUBIC ABSESS performed by Angela Hoffman MD at [address]      last yr       Medications Prior to Admission:    Prior to Admission medications    Medication Sig Start Date End Date Taking? Authorizing Provider   glucose 4g chewable tablet Take 4 tablets by mouth as needed for Low blood sugar 6/7/22   Jaqueline Urrutia DO   Insulin Pen Needle (BD PEN NEEDLE SHELBIE U/F) 32G X 4 MM MISC Uses with insulin 4 times a day 5/2/22   Trang Graves DO   insulin glargine (LANTUS SOLOSTAR) 100 UNIT/ML injection pen Inject 22 Units into the skin every morning 5/2/22   Trang Graves DO   insulin lispro, 1 Unit Dial, (HUMALOG KWIKPEN) 100 UNIT/ML SOPN Inject 1 units per 10 grams of carbs + following sliding scale. -200 add 2U, -250 add 4U, -300 add 6U, -350 add 8U, -400 add 12U, BS over 400 add 12U 4/26/22   Gail Cockayne, MD   naproxen (NAPROSYN) 500 MG tablet Take 1 tablet by mouth 2 times daily as needed for Pain 3/29/22   Flora Mcconnell MD   melatonin (RA MELATONIN) 3 MG TABS tablet Take 2 tablets by mouth daily 1/11/22   Nino You DO   acetaminophen (TYLENOL) 500 MG tablet Take 1 tablet by mouth every 4 hours as needed for Pain or Fever 12/22/21 1/1/22  Anya Hensley DO       Allergies:    Patient has no known allergies. Social History:    reports that she has never smoked. She has never used smokeless tobacco. She reports previous drug use. Drug: Marijuana [employer]). She reports that she does not drink alcohol. Family History:   family history includes Diabetes in her maternal grandmother and paternal grandmother; Stroke in an other family member.        PHYSICAL EXAM:  Vitals:  BP (!) 149/100   Pulse (!) 109   Temp 97.9 °F (36.6 °C) (Oral)   Resp 14   Ht 5' 1\" (1.549 m)   Wt 110 lb (49.9 kg)   SpO2 100%   BMI 20.78 kg/m²     General Appearance: alert and oriented to person, place and time and in no acute distress  Skin: warm and dry  Head: normocephalic and atraumatic  Eyes: pupils equal, round, and reactive to light, extraocular eye movements intact, conjunctivae normal  Neck: neck supple and non tender without mass   Pulmonary/Chest: clear to auscultation bilaterally- no wheezes, rales or rhonchi, normal air movement, no respiratory distress  Cardiovascular: normal rate, normal S1 and S2 and no carotid bruits  Abdomen: soft, non-tender, non-distended, normal bowel sounds, no masses or organomegaly  Extremities: no cyanosis, no clubbing and no edema  Neurologic: no cranial nerve deficit and speech normal        LABS:  Recent Labs     06/23/22  0148 06/23/22  0148 06/24/22  1745 06/24/22  1812 06/24/22  1946   *  --  124*  --   --    K 4.6  --  5.7*  --   --    CL 84*  --  87*  --   --    CO2 18*  --  16*  --   --    BUN 13  --  20  --   --    CREATININE 0.6  --  0.8  --   --    GLUCOSE 813*   < > 520* 480 494   CALCIUM 8.8  --  9.4  --   --     < > = values in this interval not displayed. Recent Labs     06/23/22 0148 06/24/22  1745   WBC 7.4 7.8   RBC 4.49 4.74   HGB 12.4 13.3   HCT 36.9 38.9   MCV 82.2 82.1   MCH 27.6 28.1   MCHC 33.6 34.2   RDW 12.9 13.2    344   MPV 11.1 10.9       No results for input(s): POCGLU in the last 72 hours. Radiology:   No orders to display             NOTE: This report was transcribed using voice recognition software. Every effort was made to ensure accuracy; however, inadvertent computerized transcription errors may be present.   Electronically signed by Rk Carter MD on 6/24/2022 at 10:24 PM

## 2022-06-25 NOTE — DISCHARGE SUMMARY
[facility] Physician Discharge Summary       No follow-up provider specified. Activity level: As tolerated     Dispo: Home    Condition on discharge: Stable     Patient ID:  Jessica Castro  [id number]  54 y.o.  1995    Admit date: 6/24/2022    Discharge date and time:  6/25/2022  11:48 AM    Admission Diagnoses: Principal Problem:    DKA, type 1, not at goal [facility])  Resolved Problems:    * No resolved hospital problems. *      Discharge Diagnoses: Principal Problem:    DKA, type 1, not at goal [facility])  Resolved Problems:    * No resolved hospital problems. *      Consults:  IP CONSULT TO CRITICAL CARE  IP CONSULT TO DIABETES EDUCATOR  IP CONSULT TO DIABETES EDUCATOR  IP CONSULT TO ENDOCRINOLOGY    Hospital Course:   Patient Jessica Castro is a 27 y.o. presented with DKA, type 1, not at goal [facility]) [E10.10]  Diabetic ketoacidosis without coma associated with type 1 diabetes mellitus ([facility] [facility] [E10.10]  Hyperglycemia due to type 1 diabetes mellitus ([facility] [E10.65]    Was admitted to the nausea vomiting associated with suspected viral illness. Covid and influenza negative. Patient was subsequently started on DKA protocol which continued until Gap was closed. Patient was restarted on home sub-Q basal dose and sliding scale which she tolerated without issue. Patient was still having some lower abdominal pain and dysuria however UA showed only excessive glucose without active infection. Patient was tolerating PO without return of symptoms and was felt to be stable for discharge with instructions about how to adjust her insulin regimen.         Discharge Exam:    General Appearance: alert and oriented to person, place and time and in no acute distress  Skin: warm and dry  Head: normocephalic and atraumatic  Eyes: pupils equal, round, and reactive to light, extraocular eye movements intact, conjunctivae normal  Neck: neck supple and non tender without mass   Pulmonary/Chest: clear to auscultation bilaterally- no wheezes, rales or rhonchi, normal air movement, no respiratory distress  Cardiovascular: normal rate, normal S1 and S2 and no carotid bruits  Abdomen: soft, non-tender, non-distended, normal bowel sounds, no masses or organomegaly  Extremities: no cyanosis, no clubbing and no edema  Neurologic: no cranial nerve deficit and speech normal    I/O last 3 completed shifts: In: 1630.3 [I.V.:843.4; IV Piggyback:786.9]  Out: -   I/O this shift: In: 500 [P.O.:500]  Out: -       LABS:  Recent Labs     06/24/22  1745 06/24/22  1812 06/24/22  1946 06/24/22  2358 06/25/22  0415   *  --   --  133 130*   K 5.7*  --   --  3.2* 4.5   CL 87*  --   --  101 100   CO2 16*  --   --  17* 16*   BUN 20  --   --  22* 18   CREATININE 0.8  --   --  0.6 0.5   GLUCOSE 520*   < > 494 189* 382*   CALCIUM 9.4  --   --  8.2* 8.1*    < > = values in this interval not displayed. Recent Labs     06/23/22  0148 06/24/22 1745 06/25/22  0415   WBC 7.4 7.8 7.2   RBC 4.49 4.74 3.88   HGB 12.4 13.3 10.9*   HCT 36.9 38.9 31.7*   MCV 82.2 82.1 81.7   MCH 27.6 28.1 28.1   MCHC 33.6 34.2 34.4   RDW 12.9 13.2 13.1    344 270   MPV 11.1 10.9 10.8       No results for input(s): POCGLU in the last 72 hours. Imaging:  No results found. Patient Instructions:      Medication List      CONTINUE taking these medications    BD Pen Needle Camila U/F 32G X 4 MM Misc  Generic drug: Insulin Pen Needle  Uses with insulin 4 times a day        ASK your doctor about these medications    acetaminophen 500 MG tablet  Commonly known as: TYLENOL  Take 1 tablet by mouth every 4 hours as needed for Pain or Fever     glucose 4 g chewable tablet  Take 4 tablets by mouth as needed for Low blood sugar     insulin lispro (1 Unit Dial) 100 UNIT/ML Sopn  Commonly known as: HumaLOG KwikPen  Inject 1 units per 10 grams of carbs + following sliding scale.  -200 add 2U, -250 add 4U, -300 add 6U, -350 add 8U, -400 add 12U, BS over 400 add 12U     Lantus SoloStar 100 UNIT/ML injection pen  Generic drug: insulin glargine  Inject 22 Units into the skin every morning     melatonin 3 MG Tabs tablet  Commonly known as: RA Melatonin  Take 2 tablets by mouth daily     naproxen 500 MG tablet  Commonly known as: NAPROSYN  Take 1 tablet by mouth 2 times daily as needed for Pain              Note that more than 30 minutes was spent in preparing discharge papers, discussing discharge with patient, medication review, etc.    Signed:  Electronically signed by Hernandez Stuart DO on 6/25/2022 at 11:48 AM

## 2022-06-25 NOTE — ED NOTES
This RN called report to the ICU and spoke to Suresh Avendano [facility]. ICU RN states she will be down shortly to transport patient.      Louis Jimenez RN  06/24/22 2014

## 2022-06-25 NOTE — PLAN OF CARE
Problem: Pain  Goal: Verbalizes/displays adequate comfort level or baseline comfort level  Outcome: Not Progressing     Problem: Discharge Planning  Goal: Discharge to home or other facility with appropriate resources  Outcome: Progressing     Problem: Chronic Conditions and Co-morbidities  Goal: Patient's chronic conditions and co-morbidity symptoms are monitored and maintained or improved  Outcome: Progressing

## 2022-06-25 NOTE — PLAN OF CARE
Problem: Discharge Planning  Goal: Discharge to home or other facility with appropriate resources  6/25/2022 1259 by Karen Burks RN  Outcome: Completed     Problem: Pain  Goal: Verbalizes/displays adequate comfort level or baseline comfort level  6/25/2022 1259 by Karen Burks RN  Outcome: Completed     Problem: Chronic Conditions and Co-morbidities  Goal: Patient's chronic conditions and co-morbidity symptoms are monitored and maintained or improved  6/25/2022 1259 by Karen Burks RN  Outcome: Completed

## 2022-06-25 NOTE — ED PROVIDER NOTES
27y.o. year old female presenting to the emergency room with concerns of hyperglycemia beginning yesterday. Patient reports that symptom's onset 2 days ago. Worsen with nothing. Improves withnothing. Severity of mild, with noradiation . Symptoms are constant in timing. Symptoms described as fatigue. Patient reports  associated symptoms of  Runny nose, congestion. Patient in  no acute distress. Patient was seen yesterday, and left AMA prior to the completion of medical therapy. Patient returns today as she continues to not feel well with blood glucose increasing. Chief Complaint   Patient presents with    Hyperglycemia     \" I feel like im in DKA\"       Review of Systems   Constitutional: Positive for fatigue. Negative for chills and fever. HENT: Positive for congestion and sore throat. Negative for trouble swallowing and voice change. Eyes: Negative for photophobia and visual disturbance. Respiratory: Negative for shortness of breath and wheezing. Cardiovascular: Negative for chest pain. Gastrointestinal: Negative for abdominal pain, diarrhea, nausea and vomiting. Genitourinary: Negative for dysuria and urgency. Musculoskeletal: Positive for myalgias. Negative for arthralgias. Skin: Negative for rash and wound. Neurological: Negative for dizziness, syncope, weakness and headaches. Psychiatric/Behavioral: Negative for behavioral problems and confusion. The patient is nervous/anxious. Physical Exam  Vitals reviewed. Constitutional:       General: She is not in acute distress. Appearance: Normal appearance. She is diaphoretic. She is not ill-appearing. HENT:      Head: Normocephalic. Right Ear: External ear normal.      Left Ear: External ear normal.      Nose: Congestion present. Mouth/Throat:      Pharynx: Oropharynx is clear. No oropharyngeal exudate or posterior oropharyngeal erythema. Eyes:      General: No scleral icterus.         Right eye: No discharge. Left eye: No discharge. Conjunctiva/sclera: Conjunctivae normal.   Cardiovascular:      Rate and Rhythm: Regular rhythm. Tachycardia present. Pulses: Normal pulses. Heart sounds: No friction rub. No gallop. Pulmonary:      Effort: Pulmonary effort is normal. No respiratory distress. Breath sounds: No stridor. No wheezing. Abdominal:      General: There is no distension. Palpations: Abdomen is soft. Tenderness: There is abdominal tenderness. There is no guarding or rebound. Musculoskeletal:         General: No tenderness or deformity. Cervical back: Normal range of motion and neck supple. No rigidity or tenderness. Right lower leg: No edema. Left lower leg: No edema. Skin:     General: Skin is warm. Capillary Refill: Capillary refill takes less than 2 seconds. Coloration: Skin is not jaundiced. Findings: No erythema. Neurological:      Mental Status: She is alert and oriented to person, place, and time. Sensory: No sensory deficit. Motor: No weakness. Psychiatric:         Mood and Affect: Mood normal.         Behavior: Behavior normal.          Procedures     MDM  Number of Diagnoses or Management Options  Diabetic ketoacidosis without coma associated with type 1 diabetes mellitus ([facility] [address]  Hyperglycemia due to type 1 diabetes mellitus ([facility] [facility]  Diagnosis management comments: 27year old female presenting to emergency department complaints of DKA. Patient was seen yesterday and left AMA prior to being admitted to ICU for DKA. Patient states that She continued to worse. Elevated glucose of 520. Elevated beta hydroxybutyrate. Low pH venous. Patient started on DKA protocol potassium 5.7. Spoke to Dr. Bert Bee ICU, separate patient. Spoke to hospitalist, discussed patient will plan to admit at this time.   Patient stable at time of admission.               --------------------------------------------- PAST HISTORY ---------------------------------------------  Past Medical History:  has a past medical history of Bleeding at insertion site, Common femoral artery injury, right, initial encounter, Depressive disorder, Diabetic ketoacidosis ([facility] [address] DM type 1 (diabetes mellitus, type 1) ([facility] Marijuana abuse, Non-compliance, and Trichimoniasis. Past Surgical History:  has a past surgical history that includes Abdomen surgery (N/A, 5/9/2019) and Bartholin gland cyst excision. Social History:  reports that she has never smoked. She has never used smokeless tobacco. She reports previous drug use. Drug: Marijuana [employer]). She reports that she does not drink alcohol. Family History: family history includes Diabetes in her maternal grandmother and paternal grandmother; Stroke in an other family member. The patients home medications have been reviewed. Allergies: Patient has no known allergies.     -------------------------------------------------- RESULTS -------------------------------------------------    LABS:  Results for orders placed or performed during the hospital encounter of 06/24/22   CBC with Auto Differential   Result Value Ref Range    WBC 7.8 4.5 - 11.5 E9/L    RBC 4.74 3.50 - 5.50 E12/L    Hemoglobin 13.3 11.5 - 15.5 g/dL    Hematocrit 38.9 34.0 - 48.0 %    MCV 82.1 80.0 - 99.9 fL    MCH 28.1 26.0 - 35.0 pg    MCHC 34.2 32.0 - 34.5 %    RDW 13.2 11.5 - 15.0 fL    Platelets 880 194 - 550 E9/L    MPV 10.9 7.0 - 12.0 fL    Neutrophils % 56.0 43.0 - 80.0 %    Immature Granulocytes % 0.3 0.0 - 5.0 %    Lymphocytes % 37.7 20.0 - 42.0 %    Monocytes % 5.0 2.0 - 12.0 %    Eosinophils % 0.5 0.0 - 6.0 %    Basophils % 0.5 0.0 - 2.0 %    Neutrophils Absolute 4.39 1.80 - 7.30 E9/L    Immature Granulocytes # 0.02 E9/L    Lymphocytes Absolute 2.95 1.50 - 4.00 E9/L    Monocytes Absolute 0.39 0.10 - 0.95 E9/L    Eosinophils Absolute 0.04 (L) 0.05 - 0.50 E9/L    Basophils Absolute 0.04 0.00 - 0.20 P9/P   Basic Metabolic Panel   Result Value Ref Range    Sodium 124 (L) 132 - 146 mmol/L    Potassium 5.7 (H) 3.5 - 5.0 mmol/L    Chloride 87 (L) 98 - 107 mmol/L    CO2 16 (L) 22 - 29 mmol/L    Anion Gap 21 (H) 7 - 16 mmol/L    Glucose 520 (HH) 74 - 99 mg/dL    BUN 20 6 - 20 mg/dL    CREATININE 0.8 0.5 - 1.0 mg/dL    GFR Non-African American >60 >=60 mL/min/1.73    GFR African American >60     Calcium 9.4 8.6 - 10.2 mg/dL   Hepatic Function Panel   Result Value Ref Range    Total Protein 8.2 6.4 - 8.3 g/dL    Albumin 4.1 3.5 - 5.2 g/dL    Alkaline Phosphatase 111 (H) 35 - 104 U/L    ALT 10 0 - 32 U/L    AST 32 (H) 0 - 31 U/L    Total Bilirubin 0.3 0.0 - 1.2 mg/dL    Bilirubin, Direct <0.2 0.0 - 0.3 mg/dL    Bilirubin, Indirect see below 0.0 - 1.0 mg/dL   Beta-Hydroxybutyrate   Result Value Ref Range    Beta-Hydroxybutyrate >4.50 (H) 0.02 - 0.27 mmol/L   pH, venous   Result Value Ref Range    pH, Christoph 7.24 (L) 7.35 - 7.45   Urinalysis   Result Value Ref Range    Color, UA Straw Straw/Yellow    Clarity, UA Clear Clear    Glucose, Ur >=1000 (A) Negative mg/dL    Bilirubin Urine Negative Negative    Ketones, Urine 40 (A) Negative mg/dL    Specific Gravity, UA 1.010 1.005 - 1.030    Blood, Urine Negative Negative    pH, UA 6.0 5.0 - 9.0    Protein, UA Negative Negative mg/dL    Urobilinogen, Urine 0.2 <2.0 E.U./dL    Nitrite, Urine Negative Negative    Leukocyte Esterase, Urine Negative Negative   Lactate, Sepsis   Result Value Ref Range    Lactic Acid, Sepsis 1.3 0.5 - 1.9 mmol/L   Lactate, Sepsis   Result Value Ref Range    Lactic Acid, Sepsis 1.0 0.5 - 1.9 mmol/L   POC Pregnancy Urine   Result Value Ref Range    HCG, Urine, POC Negative Negative    Lot Number [id number]     Positive QC Pass/Fail Pass     Negative QC Pass/Fail Pass    POCT Glucose   Result Value Ref Range    Glucose 480 mg/dL   POCT Glucose   Result Value Ref Range    Meter Glucose 480 (H) 74 - 99 mg/dL   POCT Glucose - every hour   Result Value Ref Range    Glucose 494 mg/dL QC OK? yes    POCT Glucose   Result Value Ref Range    Meter Glucose 494 (H) 74 - 99 mg/dL   POCT Glucose   Result Value Ref Range    Meter Glucose 455 (H) 74 - 99 mg/dL   POCT Glucose   Result Value Ref Range    Meter Glucose 277 (H) 74 - 99 mg/dL       RADIOLOGY:  No orders to display             ------------------------- NURSING NOTES AND VITALS REVIEWED ---------------------------  Date / Time Roomed:  6/24/2022  5:23 PM  ED Bed Assignment:  0210/0210-A    The nursing notes within the ED encounter and vital signs as below have been reviewed. Patient Vitals for the past 24 hrs:   BP Temp Temp src Pulse Resp SpO2 Height Weight   06/24/22 2154 (!) 149/100   (!) 109       06/24/22 2050 (!) 144/102 97.9 °F (36.6 °C) Oral (!) 105 14 100 % 5' 1\" (1.549 m) 110 lb (49.9 kg)   06/24/22 2026 (!) 146/96          06/24/22 2002 (!) 153/103 98.4 °F (36.9 °C) Oral (!) 101 16 100 %     06/24/22 1825 123/87   (!) 108 16 99 %     06/24/22 1723 117/71 98.1 °F (36.7 °C) Oral (!) 112 16 100 % 5' 1\" (1.549 m) 110 lb (49.9 kg)       Oxygen Saturation Interpretation: Normal    ------------------------------------------ PROGRESS NOTES ------------------------------------------  Re-evaluation(s):   Patients symptoms show no change  Repeat physical examination is not changed    Counseling:  I have spoken with the patient and discussed todays results, in addition to providing specific details for the plan of care and counseling regarding the diagnosis and prognosis. Their questions are answered at this time and they are agreeable with the plan of admission.    --------------------------------- ADDITIONAL PROVIDER NOTES ---------------------------------  Consultations:   Spoke with Dr. Lien Chris. Discussed case. They will admit the patient.   This patient's ED course included: a personal history and physicial examination, re-evaluation prior to disposition, multiple bedside re-evaluations, IV medications, cardiac monitoring and continuous pulse oximetry    This patient has remained hemodynamically stable during their ED course. Diagnosis:  1. Diabetic ketoacidosis without coma associated with type 1 diabetes mellitus ([facility] [address]    2. Hyperglycemia due to type 1 diabetes mellitus ([facility]        Disposition:  Patient's disposition: Admit to CCU/ICU  Patient's condition is stable. Attending was present and available throughout encounter including all critical portions;  See Attending Note/Attestation for [address], DO  Resident  06/24/22 4233

## 2022-06-26 LAB — MRSA CULTURE ONLY: NORMAL

## 2022-06-30 ENCOUNTER — TELEPHONE (OUTPATIENT)
Dept: PRIMARY CARE CLINIC | Age: 27
End: 2022-06-30

## 2022-06-30 NOTE — TELEPHONE ENCOUNTER
FYI: pt was admitted to [address] [place] [address] on the 24/25 for a DKA. Ethel Maria from [facility] called wanting me to notify you.

## 2022-08-15 ENCOUNTER — APPOINTMENT (OUTPATIENT)
Dept: GENERAL RADIOLOGY | Age: 27
DRG: 420 | End: 2022-08-15
Payer: COMMERCIAL

## 2022-08-15 ENCOUNTER — HOSPITAL ENCOUNTER (INPATIENT)
Age: 27
LOS: 2 days | Discharge: HOME | DRG: 420 | End: 2022-08-17
Attending: EMERGENCY MEDICINE | Admitting: INTERNAL MEDICINE
Payer: COMMERCIAL

## 2022-08-15 ENCOUNTER — APPOINTMENT (OUTPATIENT)
Dept: CT IMAGING | Age: 27
DRG: 420 | End: 2022-08-15
Payer: COMMERCIAL

## 2022-08-15 DIAGNOSIS — E13.10 DIABETIC KETOACIDOSIS WITHOUT COMA ASSOCIATED WITH OTHER SPECIFIED DIABETES MELLITUS (HCC): Primary | ICD-10-CM

## 2022-08-15 DIAGNOSIS — R10.84 GENERALIZED ABDOMINAL PAIN: ICD-10-CM

## 2022-08-15 DIAGNOSIS — R19.7 DIARRHEA, UNSPECIFIED TYPE: ICD-10-CM

## 2022-08-15 LAB
ALBUMIN SERPL-MCNC: 4.6 G/DL (ref 3.5–5.2)
ALP BLD-CCNC: 111 U/L (ref 35–104)
ALT SERPL-CCNC: 29 U/L (ref 0–32)
ANION GAP SERPL CALCULATED.3IONS-SCNC: 31 MMOL/L (ref 7–16)
ANION GAP SERPL CALCULATED.3IONS-SCNC: 32 MMOL/L (ref 7–16)
AST SERPL-CCNC: 61 U/L (ref 0–31)
BASOPHILS ABSOLUTE: 0.04 E9/L (ref 0–0.2)
BASOPHILS RELATIVE PERCENT: 0.3 % (ref 0–2)
BETA-HYDROXYBUTYRATE: >4.5 MMOL/L (ref 0.02–0.27)
BILIRUB SERPL-MCNC: 0.4 MG/DL (ref 0–1.2)
BUN BLDV-MCNC: 25 MG/DL (ref 6–20)
BUN BLDV-MCNC: 25 MG/DL (ref 6–20)
CALCIUM SERPL-MCNC: 10.1 MG/DL (ref 8.6–10.2)
CALCIUM SERPL-MCNC: 9.1 MG/DL (ref 8.6–10.2)
CHLORIDE BLD-SCNC: 102 MMOL/L (ref 98–107)
CHLORIDE BLD-SCNC: 88 MMOL/L (ref 98–107)
CO2: 2 MMOL/L (ref 22–29)
CO2: 4 MMOL/L (ref 22–29)
CREAT SERPL-MCNC: 0.7 MG/DL (ref 0.5–1)
CREAT SERPL-MCNC: 0.8 MG/DL (ref 0.5–1)
EOSINOPHILS ABSOLUTE: 0 E9/L (ref 0.05–0.5)
EOSINOPHILS RELATIVE PERCENT: 0 % (ref 0–6)
GFR AFRICAN AMERICAN: >60
GFR AFRICAN AMERICAN: >60
GFR NON-AFRICAN AMERICAN: >60 ML/MIN/1.73
GFR NON-AFRICAN AMERICAN: >60 ML/MIN/1.73
GLUCOSE BLD-MCNC: 519 MG/DL (ref 74–99)
GLUCOSE BLD-MCNC: 665 MG/DL (ref 74–99)
HCG QUALITATIVE: NEGATIVE
HCT VFR BLD CALC: 43.6 % (ref 34–48)
HEMOGLOBIN: 14.2 G/DL (ref 11.5–15.5)
IMMATURE GRANULOCYTES #: 0.08 E9/L
IMMATURE GRANULOCYTES %: 0.6 % (ref 0–5)
LACTIC ACID, SEPSIS: 2.5 MMOL/L (ref 0.5–1.9)
LACTIC ACID, SEPSIS: 3.2 MMOL/L (ref 0.5–1.9)
LIPASE: 25 U/L (ref 13–60)
LYMPHOCYTES ABSOLUTE: 2.35 E9/L (ref 1.5–4)
LYMPHOCYTES RELATIVE PERCENT: 16.8 % (ref 20–42)
MAGNESIUM: 2.1 MG/DL (ref 1.6–2.6)
MCH RBC QN AUTO: 27.1 PG (ref 26–35)
MCHC RBC AUTO-ENTMCNC: 32.6 % (ref 32–34.5)
MCV RBC AUTO: 83.2 FL (ref 80–99.9)
METER GLUCOSE: 399 MG/DL (ref 74–99)
METER GLUCOSE: 482 MG/DL (ref 74–99)
METER GLUCOSE: >500 MG/DL (ref 74–99)
METER GLUCOSE: >500 MG/DL (ref 74–99)
MONOCYTES ABSOLUTE: 0.27 E9/L (ref 0.1–0.95)
MONOCYTES RELATIVE PERCENT: 1.9 % (ref 2–12)
NEUTROPHILS ABSOLUTE: 11.23 E9/L (ref 1.8–7.3)
NEUTROPHILS RELATIVE PERCENT: 80.4 % (ref 43–80)
PDW BLD-RTO: 13.7 FL (ref 11.5–15)
PH VENOUS: 7.02 (ref 7.35–7.45)
PHOSPHORUS: 5.9 MG/DL (ref 2.5–4.5)
PLATELET # BLD: 299 E9/L (ref 130–450)
PMV BLD AUTO: 11.9 FL (ref 7–12)
POTASSIUM REFLEX MAGNESIUM: 7.4 MMOL/L (ref 3.5–5)
POTASSIUM SERPL-SCNC: 5.6 MMOL/L (ref 3.5–5)
POTASSIUM SERPL-SCNC: 5.9 MMOL/L (ref 3.5–5)
RBC # BLD: 5.24 E12/L (ref 3.5–5.5)
REASON FOR REJECTION: NORMAL
REJECTED TEST: NORMAL
SARS-COV-2, NAAT: NOT DETECTED
SODIUM BLD-SCNC: 124 MMOL/L (ref 132–146)
SODIUM BLD-SCNC: 135 MMOL/L (ref 132–146)
TOTAL PROTEIN: 9.4 G/DL (ref 6.4–8.3)
TROPONIN, HIGH SENSITIVITY: <6 NG/L (ref 0–9)
WBC # BLD: 14 E9/L (ref 4.5–11.5)

## 2022-08-15 PROCEDURE — 85025 COMPLETE CBC W/AUTO DIFF WBC: CPT

## 2022-08-15 PROCEDURE — 83690 ASSAY OF LIPASE: CPT

## 2022-08-15 PROCEDURE — 80053 COMPREHEN METABOLIC PANEL: CPT

## 2022-08-15 PROCEDURE — 96374 THER/PROPH/DIAG INJ IV PUSH: CPT

## 2022-08-15 PROCEDURE — 2580000003 HC RX 258: Performed by: EMERGENCY MEDICINE

## 2022-08-15 PROCEDURE — 87040 BLOOD CULTURE FOR BACTERIA: CPT

## 2022-08-15 PROCEDURE — 82010 KETONE BODYS QUAN: CPT

## 2022-08-15 PROCEDURE — 84484 ASSAY OF TROPONIN QUANT: CPT

## 2022-08-15 PROCEDURE — 80048 BASIC METABOLIC PNL TOTAL CA: CPT

## 2022-08-15 PROCEDURE — 99222 1ST HOSP IP/OBS MODERATE 55: CPT | Performed by: INTERNAL MEDICINE

## 2022-08-15 PROCEDURE — 82800 BLOOD PH: CPT

## 2022-08-15 PROCEDURE — 87635 SARS-COV-2 COVID-19 AMP PRB: CPT

## 2022-08-15 PROCEDURE — 84132 ASSAY OF SERUM POTASSIUM: CPT

## 2022-08-15 PROCEDURE — 84100 ASSAY OF PHOSPHORUS: CPT

## 2022-08-15 PROCEDURE — 6360000002 HC RX W HCPCS: Performed by: EMERGENCY MEDICINE

## 2022-08-15 PROCEDURE — 83735 ASSAY OF MAGNESIUM: CPT

## 2022-08-15 PROCEDURE — 6360000004 HC RX CONTRAST MEDICATION: Performed by: RADIOLOGY

## 2022-08-15 PROCEDURE — 96376 TX/PRO/DX INJ SAME DRUG ADON: CPT

## 2022-08-15 PROCEDURE — 6370000000 HC RX 637 (ALT 250 FOR IP): Performed by: EMERGENCY MEDICINE

## 2022-08-15 PROCEDURE — 74177 CT ABD & PELVIS W/CONTRAST: CPT

## 2022-08-15 PROCEDURE — 84703 CHORIONIC GONADOTROPIN ASSAY: CPT

## 2022-08-15 PROCEDURE — 83605 ASSAY OF LACTIC ACID: CPT

## 2022-08-15 PROCEDURE — 2000000000 HC ICU R&B

## 2022-08-15 PROCEDURE — 93005 ELECTROCARDIOGRAM TRACING: CPT | Performed by: EMERGENCY MEDICINE

## 2022-08-15 PROCEDURE — 71045 X-RAY EXAM CHEST 1 VIEW: CPT

## 2022-08-15 PROCEDURE — 99285 EMERGENCY DEPT VISIT HI MDM: CPT

## 2022-08-15 PROCEDURE — 82962 GLUCOSE BLOOD TEST: CPT

## 2022-08-15 RX ORDER — MORPHINE SULFATE 2 MG/ML
2 INJECTION, SOLUTION INTRAMUSCULAR; INTRAVENOUS
Status: DISCONTINUED | OUTPATIENT
Start: 2022-08-15 | End: 2022-08-16

## 2022-08-15 RX ORDER — 0.9 % SODIUM CHLORIDE 0.9 %
1000 INTRAVENOUS SOLUTION INTRAVENOUS ONCE
Status: COMPLETED | OUTPATIENT
Start: 2022-08-15 | End: 2022-08-16

## 2022-08-15 RX ORDER — MAGNESIUM SULFATE 1 G/100ML
1000 INJECTION INTRAVENOUS PRN
Status: DISCONTINUED | OUTPATIENT
Start: 2022-08-15 | End: 2022-08-16

## 2022-08-15 RX ORDER — SODIUM CHLORIDE 0.9 % (FLUSH) 0.9 %
SYRINGE (ML) INJECTION
Status: COMPLETED
Start: 2022-08-15 | End: 2022-08-16

## 2022-08-15 RX ORDER — ONDANSETRON 2 MG/ML
4 INJECTION INTRAMUSCULAR; INTRAVENOUS ONCE
Status: DISCONTINUED | OUTPATIENT
Start: 2022-08-15 | End: 2022-08-16

## 2022-08-15 RX ORDER — POTASSIUM CHLORIDE 7.45 MG/ML
10 INJECTION INTRAVENOUS PRN
Status: DISCONTINUED | OUTPATIENT
Start: 2022-08-15 | End: 2022-08-16

## 2022-08-15 RX ORDER — DICYCLOMINE HYDROCHLORIDE 10 MG/ML
20 INJECTION INTRAMUSCULAR ONCE
Status: DISCONTINUED | OUTPATIENT
Start: 2022-08-15 | End: 2022-08-16

## 2022-08-15 RX ORDER — DEXTROSE AND SODIUM CHLORIDE 5; .45 G/100ML; G/100ML
INJECTION, SOLUTION INTRAVENOUS CONTINUOUS PRN
Status: DISCONTINUED | OUTPATIENT
Start: 2022-08-15 | End: 2022-08-16

## 2022-08-15 RX ORDER — FENTANYL CITRATE 50 UG/ML
50 INJECTION, SOLUTION INTRAMUSCULAR; INTRAVENOUS ONCE
Status: COMPLETED | OUTPATIENT
Start: 2022-08-15 | End: 2022-08-15

## 2022-08-15 RX ORDER — ONDANSETRON 2 MG/ML
4 INJECTION INTRAMUSCULAR; INTRAVENOUS EVERY 6 HOURS PRN
Status: DISCONTINUED | OUTPATIENT
Start: 2022-08-15 | End: 2022-08-17 | Stop reason: HOSPADM

## 2022-08-15 RX ORDER — KETOROLAC TROMETHAMINE 30 MG/ML
30 INJECTION, SOLUTION INTRAMUSCULAR; INTRAVENOUS ONCE
Status: DISCONTINUED | OUTPATIENT
Start: 2022-08-15 | End: 2022-08-16

## 2022-08-15 RX ORDER — SODIUM CHLORIDE 9 MG/ML
INJECTION, SOLUTION INTRAVENOUS CONTINUOUS
Status: DISCONTINUED | OUTPATIENT
Start: 2022-08-15 | End: 2022-08-16

## 2022-08-15 RX ADMIN — SODIUM CHLORIDE 0.1 UNITS/KG/HR: 9 INJECTION, SOLUTION INTRAVENOUS at 20:19

## 2022-08-15 RX ADMIN — FENTANYL CITRATE 50 MCG: 50 INJECTION, SOLUTION INTRAMUSCULAR; INTRAVENOUS at 18:59

## 2022-08-15 RX ADMIN — SODIUM CHLORIDE 1000 ML: 9 INJECTION, SOLUTION INTRAVENOUS at 17:15

## 2022-08-15 RX ADMIN — IOPAMIDOL 75 ML: 755 INJECTION, SOLUTION INTRAVENOUS at 21:59

## 2022-08-15 RX ADMIN — FENTANYL CITRATE 50 MCG: 50 INJECTION, SOLUTION INTRAMUSCULAR; INTRAVENOUS at 21:34

## 2022-08-15 RX ADMIN — SODIUM CHLORIDE: 9 INJECTION, SOLUTION INTRAVENOUS at 20:20

## 2022-08-15 RX ADMIN — SODIUM CHLORIDE 1000 ML: 9 INJECTION, SOLUTION INTRAVENOUS at 17:16

## 2022-08-15 ASSESSMENT — ENCOUNTER SYMPTOMS
EYE REDNESS: 0
SHORTNESS OF BREATH: 0
NAUSEA: 1
VOMITING: 1
ABDOMINAL PAIN: 0
BACK PAIN: 1
DIARRHEA: 1

## 2022-08-15 ASSESSMENT — PAIN - FUNCTIONAL ASSESSMENT: PAIN_FUNCTIONAL_ASSESSMENT: 0-10

## 2022-08-15 ASSESSMENT — PAIN SCALES - GENERAL
PAINLEVEL_OUTOF10: 10
PAINLEVEL_OUTOF10: 10
PAINLEVEL_OUTOF10: 0
PAINLEVEL_OUTOF10: 10

## 2022-08-15 NOTE — ED PROVIDER NOTES
Chief complaint: Hyperglycemia      HPI:  8/15/22, Time: 4:02 PM EDT    HPI             Emily Zavala is a 27 y.o. female presenting to the ED for hyperglycemia. The history is obtained from the patient as well as patient's medical record. Patient's present emergency department with a chief complaint of hyperglycemia. The patient is a type I diabetic with history of DKA. She reports that over the summer she has had a poorly controlled diet. She reports that her blood sugars have been elevated. She states that she is compliant with her insulin. She began with hyperglycemia last night. She states that she does have low back pain. Described as sharp and radiates across her bilateral lower back. Nothing makes this better. Nothing makes this worse. She states this does feel similar to when she has had DKA in the past.  She denies any bowel or bladder incontinence, saddle anesthesias or weakness of the bilateral lower extremities. She has no history of epidural injections. She denies any fevers, chest pain, shortness of breath. She does admit to multiple episodes of loose stool. ROS:   Review of Systems   Constitutional:  Negative for chills and fatigue. HENT:  Negative for congestion. Eyes:  Negative for redness. Respiratory:  Negative for shortness of breath. Cardiovascular:  Negative for chest pain. Gastrointestinal:  Positive for diarrhea, nausea and vomiting. Negative for abdominal pain. Genitourinary:  Negative for dysuria. Musculoskeletal:  Positive for back pain. Negative for arthralgias. Skin:  Negative for rash. Neurological:  Negative for light-headedness. Psychiatric/Behavioral:  Negative for confusion.     All other systems reviewed and are negative.    --------------------------------------------- PAST HISTORY ---------------------------------------------  Past Medical History:  has a past medical history of Bleeding at insertion site, Common femoral artery injury, right, initial encounter, Depressive disorder, Diabetic ketoacidosis ([facility] [address] DM type 1 (diabetes mellitus, type 1) ([facility] [address] Marijuana abuse, Non-compliance, and Trichimoniasis. Past Surgical History:  has a past surgical history that includes Abdomen surgery (N/A, 5/9/2019) and Bartholin gland cyst excision. Social History:  reports that she has never smoked. She has never used smokeless tobacco. She reports that she does not currently use drugs after having used the following drugs: Marijuana William Semen). She reports that she does not drink alcohol. Family History: family history includes Diabetes in her maternal grandmother and paternal grandmother; Stroke in an other family member. The patients home medications have been reviewed. Allergies: Patient has no known allergies. ---------------------------------------------------PHYSICAL EXAM--------------------------------------    Constitutional/General: Alert and oriented x3, well appearing, non toxic in NAD  Head: Normocephalic and atraumatic  Mouth: Oropharynx clear, handling secretions, no trismus  Neck: Supple, full ROM,  Pulmonary: Lungs clear to auscultation bilaterally, no wheezes, rales, or rhonchi. Not in respiratory distress  Cardiovascular:  Regular rate. Regular rhythm. No murmurs  Chest: no chest wall tenderness  Abdomen: Soft. Non tender. Non distended. No rebound, guarding, or rigidity. No pulsatile masses appreciated. Musculoskeletal: Moves all extremities x 4. Warm and well perfused, no clubbing, cyanosis, or edema. Capillary refill <3 seconds, mild tenderness to palpation in the lower lumbar spine, no step-offs or deformities. No overlying erythema, warm to touch or cellulitic changes,   Skin: warm and dry. No rashes.    Neurologic: GCS 15, no nerves II to XII grossly intact bilaterally, 5 out of 5 muscle strength of bilateral upper and lower extremities, there is intact hip abduction, flexion as well as knee flexion and extension and plantar and dorsiflexion  Psych: Normal Affect    -------------------------------------------------- RESULTS -------------------------------------------------  I have personally reviewed all laboratory and imaging results for this patient. Results are listed below.      LABS:  Results for orders placed or performed during the hospital encounter of 08/15/22   COVID-19, Rapid    Specimen: Nasopharyngeal Swab   Result Value Ref Range    SARS-CoV-2, NAAT Not Detected Not Detected   CBC with Auto Differential   Result Value Ref Range    WBC 14.0 (H) 4.5 - 11.5 E9/L    RBC 5.24 3.50 - 5.50 E12/L    Hemoglobin 14.2 11.5 - 15.5 g/dL    Hematocrit 43.6 34.0 - 48.0 %    MCV 83.2 80.0 - 99.9 fL    MCH 27.1 26.0 - 35.0 pg    MCHC 32.6 32.0 - 34.5 %    RDW 13.7 11.5 - 15.0 fL    Platelets 238 525 - 982 E9/L    MPV 11.9 7.0 - 12.0 fL    Neutrophils % 80.4 (H) 43.0 - 80.0 %    Immature Granulocytes % 0.6 0.0 - 5.0 %    Lymphocytes % 16.8 (L) 20.0 - 42.0 %    Monocytes % 1.9 (L) 2.0 - 12.0 %    Eosinophils % 0.0 0.0 - 6.0 %    Basophils % 0.3 0.0 - 2.0 %    Neutrophils Absolute 11.23 (H) 1.80 - 7.30 E9/L    Immature Granulocytes # 0.08 E9/L    Lymphocytes Absolute 2.35 1.50 - 4.00 E9/L    Monocytes Absolute 0.27 0.10 - 0.95 E9/L    Eosinophils Absolute 0.00 (L) 0.05 - 0.50 E9/L    Basophils Absolute 0.04 0.00 - 0.20 E9/L   Comprehensive Metabolic Panel w/ Reflex to MG   Result Value Ref Range    Sodium 124 (L) 132 - 146 mmol/L    Potassium reflex Magnesium 7.4 (HH) 3.5 - 5.0 mmol/L    Chloride 88 (L) 98 - 107 mmol/L    CO2 4 (LL) 22 - 29 mmol/L    Anion Gap 32 (H) 7 - 16 mmol/L    Glucose 665 (HH) 74 - 99 mg/dL    BUN 25 (H) 6 - 20 mg/dL    Creatinine 0.7 0.5 - 1.0 mg/dL    GFR Non-African American >60 >=60 mL/min/1.73    GFR African American >60     Calcium 10.1 8.6 - 10.2 mg/dL    Total Protein 9.4 (H) 6.4 - 8.3 g/dL    Albumin 4.6 3.5 - 5.2 g/dL    Total Bilirubin 0.4 0.0 - 1.2 mg/dL    Alkaline Phosphatase 111 (H) 35 - 104 U/L    ALT 29 0 - 32 U/L    AST 61 (H) 0 - 31 U/L   Lipase   Result Value Ref Range    Lipase 25 13 - 60 U/L   Lactate, Sepsis   Result Value Ref Range    Lactic Acid, Sepsis 3.2 (H) 0.5 - 1.9 mmol/L   Lactate, Sepsis   Result Value Ref Range    Lactic Acid, Sepsis 2.5 (H) 0.5 - 1.9 mmol/L   pH, venous   Result Value Ref Range    pH, Christoph 7.02 (LL) 7.35 - 7.45   Beta-Hydroxybutyrate   Result Value Ref Range    Beta-Hydroxybutyrate >4.50 (H) 0.02 - 0.27 mmol/L   SPECIMEN REJECTION   Result Value Ref Range    Rejected Test trp     Reason for Rejection see below    Basic Metabolic Panel   Result Value Ref Range    Sodium 135 132 - 146 mmol/L    Potassium 5.9 (H) 3.5 - 5.0 mmol/L    Chloride 102 98 - 107 mmol/L    CO2 2 (LL) 22 - 29 mmol/L    Anion Gap 31 (H) 7 - 16 mmol/L    Glucose 519 (HH) 74 - 99 mg/dL    BUN 25 (H) 6 - 20 mg/dL    Creatinine 0.8 0.5 - 1.0 mg/dL    GFR Non-African American >60 >=60 mL/min/1.73    GFR African American >60     Calcium 9.1 8.6 - 10.2 mg/dL   Magnesium   Result Value Ref Range    Magnesium 2.1 1.6 - 2.6 mg/dL   Phosphorus   Result Value Ref Range    Phosphorus 5.9 (H) 2.5 - 4.5 mg/dL   Troponin   Result Value Ref Range    Troponin, High Sensitivity <6 0 - 9 ng/L   Potassium   Result Value Ref Range    Potassium 5.6 (H) 3.5 - 5.0 mmol/L   HCG, SERUM, QUALITATIVE   Result Value Ref Range    hCG Qual NEGATIVE NEGATIVE   POCT Glucose   Result Value Ref Range    Meter Glucose >500 (H) 74 - 99 mg/dL   POCT Glucose   Result Value Ref Range    Meter Glucose >500 (H) 74 - 99 mg/dL   POCT Glucose   Result Value Ref Range    Meter Glucose 482 (H) 74 - 99 mg/dL   POCT Glucose   Result Value Ref Range    Meter Glucose 399 (H) 74 - 99 mg/dL   EKG 12 Lead   Result Value Ref Range    Ventricular Rate 113 BPM    Atrial Rate 113 BPM    P-R Interval 144 ms    QRS Duration 62 ms    Q-T Interval 334 ms    QTc Calculation (Bazett) 458 ms    P Axis 81 degrees    R Axis 67 degrees    T Axis 59 degrees RADIOLOGY:  Interpreted by Radiologist.  CT ABDOMEN PELVIS W IV CONTRAST Additional Contrast? None   Final Result   1. Findings consistent with diarrheal illness as described above   2. Gastric wall prominence may simply represent underdistention although   gastritis is not excluded   3. Moderate pelvic free fluid, commonly physiologic for age but nonspecific         XR CHEST PORTABLE   Final Result   No acute process. EKG:  This EKG is signed and interpreted by me. States tachycardia rate of 113, no ST segment elevation or depression, WI interval 144 MS, QRS 62 MS,  ms  Interpreted by me      ------------------------- NURSING NOTES AND VITALS REVIEWED ---------------------------   The nursing notes within the ED encounter and vital signs as below have been reviewed by myself. BP (!) 107/56   Pulse (!) 103   Temp 97.5 °F (36.4 °C) (Oral)   Resp 24   Ht 5' 1\" (1.549 m)   Wt 110 lb (49.9 kg)   SpO2 100%   BMI 20.78 kg/m²   Oxygen Saturation Interpretation: Normal    The patients available past medical records and past encounters were reviewed.         ------------------------------ ED COURSE/MEDICAL DECISION MAKING----------------------  Medications   ketorolac (TORADOL) injection 30 mg (30 mg IntraVENous Not Given 8/15/22 1808)   dextrose bolus 10% 125 mL (has no administration in time range)     Or   dextrose bolus 10% 250 mL (has no administration in time range)   potassium chloride 10 mEq/100 mL IVPB (Peripheral Line) (has no administration in time range)   magnesium sulfate 1000 mg in dextrose 5% 100 mL IVPB (has no administration in time range)   sodium phosphate 10 mmol in sodium chloride 0.9 % 250 mL IVPB (has no administration in time range)     Or   sodium phosphate 15 mmol in dextrose 5 % 250 mL IVPB (has no administration in time range)     Or   sodium phosphate 20 mmol in dextrose 5 % 500 mL IVPB (has no administration in time range)   0.9 % sodium chloride infusion ( IntraVENous New Bag 8/15/22 2020)   dextrose 5 % and 0.45 % sodium chloride infusion (has no administration in time range)   insulin regular (HUMULIN R;NOVOLIN R) 100 Units in sodium chloride 0.9 % 100 mL infusion (0.05 Units/kg/hr × 49 kg IntraVENous Rate/Dose Change 8/15/22 2239)   ondansetron (ZOFRAN) injection 4 mg (has no administration in time range)   sodium chloride flush 0.9 % injection (has no administration in time range)   0.9 % sodium chloride bolus (has no administration in time range)   ondansetron (ZOFRAN) injection 4 mg (has no administration in time range)   0.9 % sodium chloride bolus (1,000 mLs IntraVENous New Bag 8/15/22 1715)   0.9 % sodium chloride bolus (1,000 mLs IntraVENous New Bag 8/15/22 1716)   fentaNYL (SUBLIMAZE) injection 50 mcg (50 mcg IntraVENous Given 8/15/22 1859)   fentaNYL (SUBLIMAZE) injection 50 mcg (50 mcg IntraVENous Given 8/15/22 2134)   iopamidol (ISOVUE-370) 76 % injection 75 mL (75 mLs IntraVENous Given 8/15/22 2159)             Medical Decision Making:   I, Dr. Roxann Dukes am the primary physician of record. Kameron Song is a 27 y.o. female who presents to the ED for hyperglycemia. Upon arrival to the emergency department patient does complain of nausea, vomiting and hyperglycemia. The patient did receive IV fluids. The patient did have labs and imaging which were reviewed. The patient was found to be in DKA. Patient was started on DKA protocol. Patient will be admitted for further treatment and evaluation. Re-Evaluations/Consultations:  Patient is in the bed no acute distress. Results of today discussed.   Patient will be admitted             ED Course as of 08/15/22 2327   Mon Aug 15, 2022   7168 Spoke with Dr. Taina Cuellar she will accept the patient for admission [MT]   2313 Spoke with Dr. Zbigniew Leyva he will accept the patient for admission [MT]      ED Course User Index  [MT] Ramesh Jimenes,                This patient's ED course included: History, physical examination, reevaluation prior to disposition, labs, imaging, telemetry monitoring, EKG, IVF, insulin infusion      This patient has remained hemodynamically stable during their ED course. Critical care:  Critical Care: Please note that the withdrawal or failure to initiate urgent interventions for this patient would likely result in a life threatening deterioration or permanent disability. Accordingly this patient received 34 minutes of critical care time, excluding separately billable procedures. Counseling: The emergency provider has spoken with the patient and discussed todays results, in addition to providing specific details for the plan of care and counseling regarding the diagnosis and prognosis. Questions are answered at this time and they are agreeable with the plan.       --------------------------------- IMPRESSION AND DISPOSITION ---------------------------------    IMPRESSION  1. Diabetic ketoacidosis without coma associated with other specified diabetes mellitus ([facility] [address]    2. Generalized abdominal pain    3. Diarrhea, unspecified type        DISPOSITION  Disposition: Admit to ICU  Patient condition is serious        NOTE: This report was transcribed using voice recognition software.  Every effort was made to ensure accuracy; however, inadvertent computerized transcription errors may be present         Danika Taylor DO  08/15/22 6816

## 2022-08-15 NOTE — PROGRESS NOTES
Admission database completed to best of this RN's ability. Care plan and education initiated. Pt independent from home with grandmother. Has glucometer and diabetic supplies at home. Denies any use of DME with ambulation or [facility] services prior to admission.

## 2022-08-16 ENCOUNTER — APPOINTMENT (OUTPATIENT)
Dept: GENERAL RADIOLOGY | Age: 27
DRG: 420 | End: 2022-08-16
Payer: COMMERCIAL

## 2022-08-16 PROBLEM — E11.10 DIABETIC ACIDOSIS WITHOUT COMA (HCC): Status: ACTIVE | Noted: 2020-05-04

## 2022-08-16 LAB
AMPHETAMINE SCREEN, URINE: NOT DETECTED
ANION GAP SERPL CALCULATED.3IONS-SCNC: 11 MMOL/L (ref 7–16)
ANION GAP SERPL CALCULATED.3IONS-SCNC: 14 MMOL/L (ref 7–16)
ANION GAP SERPL CALCULATED.3IONS-SCNC: 17 MMOL/L (ref 7–16)
ANION GAP SERPL CALCULATED.3IONS-SCNC: 26 MMOL/L (ref 7–16)
BACTERIA: ABNORMAL /HPF
BARBITURATE SCREEN URINE: NOT DETECTED
BASOPHILS ABSOLUTE: 0.06 E9/L (ref 0–0.2)
BASOPHILS RELATIVE PERCENT: 0.3 % (ref 0–2)
BENZODIAZEPINE SCREEN, URINE: NOT DETECTED
BILIRUBIN URINE: NEGATIVE
BLOOD, URINE: ABNORMAL
BUN BLDV-MCNC: 11 MG/DL (ref 6–20)
BUN BLDV-MCNC: 14 MG/DL (ref 6–20)
BUN BLDV-MCNC: 15 MG/DL (ref 6–20)
BUN BLDV-MCNC: 22 MG/DL (ref 6–20)
CALCIUM SERPL-MCNC: 7.6 MG/DL (ref 8.6–10.2)
CALCIUM SERPL-MCNC: 8.4 MG/DL (ref 8.6–10.2)
CALCIUM SERPL-MCNC: 8.5 MG/DL (ref 8.6–10.2)
CALCIUM SERPL-MCNC: 8.8 MG/DL (ref 8.6–10.2)
CANNABINOID SCREEN URINE: NOT DETECTED
CHLORIDE BLD-SCNC: 109 MMOL/L (ref 98–107)
CHLORIDE BLD-SCNC: 112 MMOL/L (ref 98–107)
CLARITY: CLEAR
CO2: 15 MMOL/L (ref 22–29)
CO2: 17 MMOL/L (ref 22–29)
CO2: 17 MMOL/L (ref 22–29)
CO2: 3 MMOL/L (ref 22–29)
COCAINE METABOLITE SCREEN URINE: NOT DETECTED
COLOR: YELLOW
CREAT SERPL-MCNC: 0.5 MG/DL (ref 0.5–1)
CREAT SERPL-MCNC: 0.7 MG/DL (ref 0.5–1)
EKG ATRIAL RATE: 113 BPM
EKG P AXIS: 81 DEGREES
EKG P-R INTERVAL: 144 MS
EKG Q-T INTERVAL: 334 MS
EKG QRS DURATION: 62 MS
EKG QTC CALCULATION (BAZETT): 458 MS
EKG R AXIS: 67 DEGREES
EKG T AXIS: 59 DEGREES
EKG VENTRICULAR RATE: 113 BPM
EOSINOPHILS ABSOLUTE: 0.01 E9/L (ref 0.05–0.5)
EOSINOPHILS RELATIVE PERCENT: 0 % (ref 0–6)
FENTANYL SCREEN, URINE: POSITIVE
GFR AFRICAN AMERICAN: >60
GFR NON-AFRICAN AMERICAN: >60 ML/MIN/1.73
GLUCOSE BLD-MCNC: 155 MG/DL (ref 74–99)
GLUCOSE BLD-MCNC: 157 MG/DL (ref 74–99)
GLUCOSE BLD-MCNC: 215 MG/DL (ref 74–99)
GLUCOSE BLD-MCNC: 247 MG/DL (ref 74–99)
GLUCOSE URINE: 500 MG/DL
HBA1C MFR BLD: 14.6 % (ref 4–5.6)
HCT VFR BLD CALC: 32 % (ref 34–48)
HEMOGLOBIN: 10.8 G/DL (ref 11.5–15.5)
IMMATURE GRANULOCYTES #: 0.43 E9/L
IMMATURE GRANULOCYTES %: 2 % (ref 0–5)
KETONES, URINE: >=80 MG/DL
LACTIC ACID: 1.4 MMOL/L (ref 0.5–2.2)
LEUKOCYTE ESTERASE, URINE: NEGATIVE
LYMPHOCYTES ABSOLUTE: 4.7 E9/L (ref 1.5–4)
LYMPHOCYTES RELATIVE PERCENT: 21.8 % (ref 20–42)
Lab: ABNORMAL
MAGNESIUM: 1.6 MG/DL (ref 1.6–2.6)
MAGNESIUM: 1.8 MG/DL (ref 1.6–2.6)
MAGNESIUM: 1.8 MG/DL (ref 1.6–2.6)
MAGNESIUM: 2.2 MG/DL (ref 1.6–2.6)
MCH RBC QN AUTO: 27.2 PG (ref 26–35)
MCHC RBC AUTO-ENTMCNC: 33.8 % (ref 32–34.5)
MCV RBC AUTO: 80.6 FL (ref 80–99.9)
METER GLUCOSE: 142 MG/DL (ref 74–99)
METER GLUCOSE: 143 MG/DL (ref 74–99)
METER GLUCOSE: 144 MG/DL (ref 74–99)
METER GLUCOSE: 145 MG/DL (ref 74–99)
METER GLUCOSE: 153 MG/DL (ref 74–99)
METER GLUCOSE: 154 MG/DL (ref 74–99)
METER GLUCOSE: 199 MG/DL (ref 74–99)
METER GLUCOSE: 205 MG/DL (ref 74–99)
METER GLUCOSE: 235 MG/DL (ref 74–99)
METER GLUCOSE: 237 MG/DL (ref 74–99)
METER GLUCOSE: 279 MG/DL (ref 74–99)
METER GLUCOSE: 287 MG/DL (ref 74–99)
METHADONE SCREEN, URINE: NOT DETECTED
MONOCYTES ABSOLUTE: 1.37 E9/L (ref 0.1–0.95)
MONOCYTES RELATIVE PERCENT: 6.4 % (ref 2–12)
NEUTROPHILS ABSOLUTE: 14.99 E9/L (ref 1.8–7.3)
NEUTROPHILS RELATIVE PERCENT: 69.5 % (ref 43–80)
NITRITE, URINE: NEGATIVE
OPIATE SCREEN URINE: POSITIVE
OXYCODONE URINE: NOT DETECTED
PDW BLD-RTO: 13.1 FL (ref 11.5–15)
PH UA: 5.5 (ref 5–9)
PHENCYCLIDINE SCREEN URINE: NOT DETECTED
PHOSPHORUS: 1.7 MG/DL (ref 2.5–4.5)
PHOSPHORUS: 2 MG/DL (ref 2.5–4.5)
PHOSPHORUS: 2.1 MG/DL (ref 2.5–4.5)
PHOSPHORUS: 4.7 MG/DL (ref 2.5–4.5)
PLATELET # BLD: 308 E9/L (ref 130–450)
PMV BLD AUTO: 10.5 FL (ref 7–12)
POTASSIUM SERPL-SCNC: 3.8 MMOL/L (ref 3.5–5)
POTASSIUM SERPL-SCNC: 4 MMOL/L (ref 3.5–5)
POTASSIUM SERPL-SCNC: 4.1 MMOL/L (ref 3.5–5)
POTASSIUM SERPL-SCNC: 4.8 MMOL/L (ref 3.5–5)
PROCALCITONIN: 1 NG/ML (ref 0–0.08)
PROTEIN UA: 30 MG/DL
RBC # BLD: 3.97 E12/L (ref 3.5–5.5)
RBC UA: ABNORMAL /HPF (ref 0–2)
SODIUM BLD-SCNC: 138 MMOL/L (ref 132–146)
SODIUM BLD-SCNC: 140 MMOL/L (ref 132–146)
SODIUM BLD-SCNC: 143 MMOL/L (ref 132–146)
SODIUM BLD-SCNC: 144 MMOL/L (ref 132–146)
SPECIFIC GRAVITY UA: 1.02 (ref 1–1.03)
UROBILINOGEN, URINE: 0.2 E.U./DL
WBC # BLD: 21.6 E9/L (ref 4.5–11.5)
WBC UA: ABNORMAL /HPF (ref 0–5)

## 2022-08-16 PROCEDURE — 99222 1ST HOSP IP/OBS MODERATE 55: CPT | Performed by: INTERNAL MEDICINE

## 2022-08-16 PROCEDURE — 2500000003 HC RX 250 WO HCPCS

## 2022-08-16 PROCEDURE — 81001 URINALYSIS AUTO W/SCOPE: CPT

## 2022-08-16 PROCEDURE — 6370000000 HC RX 637 (ALT 250 FOR IP): Performed by: STUDENT IN AN ORGANIZED HEALTH CARE EDUCATION/TRAINING PROGRAM

## 2022-08-16 PROCEDURE — 80307 DRUG TEST PRSMV CHEM ANLYZR: CPT

## 2022-08-16 PROCEDURE — 71045 X-RAY EXAM CHEST 1 VIEW: CPT

## 2022-08-16 PROCEDURE — 6360000002 HC RX W HCPCS: Performed by: INTERNAL MEDICINE

## 2022-08-16 PROCEDURE — 83036 HEMOGLOBIN GLYCOSYLATED A1C: CPT

## 2022-08-16 PROCEDURE — C1751 CATH, INF, PER/CENT/MIDLINE: HCPCS

## 2022-08-16 PROCEDURE — 36592 COLLECT BLOOD FROM PICC: CPT

## 2022-08-16 PROCEDURE — 1200000000 HC SEMI PRIVATE

## 2022-08-16 PROCEDURE — 2580000003 HC RX 258

## 2022-08-16 PROCEDURE — 85025 COMPLETE CBC W/AUTO DIFF WBC: CPT

## 2022-08-16 PROCEDURE — 83735 ASSAY OF MAGNESIUM: CPT

## 2022-08-16 PROCEDURE — 84100 ASSAY OF PHOSPHORUS: CPT

## 2022-08-16 PROCEDURE — 6370000000 HC RX 637 (ALT 250 FOR IP)

## 2022-08-16 PROCEDURE — 80048 BASIC METABOLIC PNL TOTAL CA: CPT

## 2022-08-16 PROCEDURE — 84145 PROCALCITONIN (PCT): CPT

## 2022-08-16 PROCEDURE — 82962 GLUCOSE BLOOD TEST: CPT

## 2022-08-16 PROCEDURE — 36556 INSERT NON-TUNNEL CV CATH: CPT

## 2022-08-16 PROCEDURE — 6360000002 HC RX W HCPCS

## 2022-08-16 PROCEDURE — 05HM33Z INSERTION OF INFUSION DEVICE INTO RIGHT INTERNAL JUGULAR VEIN, PERCUTANEOUS APPROACH: ICD-10-PCS | Performed by: INTERNAL MEDICINE

## 2022-08-16 PROCEDURE — 36415 COLL VENOUS BLD VENIPUNCTURE: CPT

## 2022-08-16 PROCEDURE — 87081 CULTURE SCREEN ONLY: CPT

## 2022-08-16 PROCEDURE — 2580000003 HC RX 258: Performed by: EMERGENCY MEDICINE

## 2022-08-16 PROCEDURE — 2580000003 HC RX 258: Performed by: INTERNAL MEDICINE

## 2022-08-16 PROCEDURE — 2500000003 HC RX 250 WO HCPCS: Performed by: EMERGENCY MEDICINE

## 2022-08-16 PROCEDURE — 6370000000 HC RX 637 (ALT 250 FOR IP): Performed by: INTERNAL MEDICINE

## 2022-08-16 PROCEDURE — 99233 SBSQ HOSP IP/OBS HIGH 50: CPT | Performed by: INTERNAL MEDICINE

## 2022-08-16 PROCEDURE — 83605 ASSAY OF LACTIC ACID: CPT

## 2022-08-16 RX ORDER — ENOXAPARIN SODIUM 100 MG/ML
30 INJECTION SUBCUTANEOUS DAILY
Status: DISCONTINUED | OUTPATIENT
Start: 2022-08-16 | End: 2022-08-17 | Stop reason: HOSPADM

## 2022-08-16 RX ORDER — ENOXAPARIN SODIUM 100 MG/ML
40 INJECTION SUBCUTANEOUS DAILY
Status: DISCONTINUED | OUTPATIENT
Start: 2022-08-16 | End: 2022-08-16 | Stop reason: DRUGHIGH

## 2022-08-16 RX ORDER — INSULIN LISPRO 100 [IU]/ML
0-4 INJECTION, SOLUTION INTRAVENOUS; SUBCUTANEOUS NIGHTLY
Status: DISCONTINUED | OUTPATIENT
Start: 2022-08-16 | End: 2022-08-17 | Stop reason: HOSPADM

## 2022-08-16 RX ORDER — ACETAMINOPHEN 325 MG/1
650 TABLET ORAL EVERY 4 HOURS PRN
Status: DISCONTINUED | OUTPATIENT
Start: 2022-08-16 | End: 2022-08-17 | Stop reason: HOSPADM

## 2022-08-16 RX ORDER — SODIUM CHLORIDE 9 MG/ML
INJECTION, SOLUTION INTRAVENOUS CONTINUOUS
Status: DISCONTINUED | OUTPATIENT
Start: 2022-08-16 | End: 2022-08-16

## 2022-08-16 RX ORDER — DEXTROSE AND SODIUM CHLORIDE 5; .45 G/100ML; G/100ML
INJECTION, SOLUTION INTRAVENOUS CONTINUOUS PRN
Status: DISCONTINUED | OUTPATIENT
Start: 2022-08-16 | End: 2022-08-16

## 2022-08-16 RX ORDER — POTASSIUM CHLORIDE 29.8 MG/ML
20 INJECTION INTRAVENOUS PRN
Status: DISCONTINUED | OUTPATIENT
Start: 2022-08-16 | End: 2022-08-16

## 2022-08-16 RX ORDER — INSULIN LISPRO 100 [IU]/ML
1-5 INJECTION, SOLUTION INTRAVENOUS; SUBCUTANEOUS
Status: DISCONTINUED | OUTPATIENT
Start: 2022-08-16 | End: 2022-08-17 | Stop reason: HOSPADM

## 2022-08-16 RX ORDER — PANTOPRAZOLE SODIUM 40 MG/1
40 TABLET, DELAYED RELEASE ORAL
Status: DISCONTINUED | OUTPATIENT
Start: 2022-08-16 | End: 2022-08-17 | Stop reason: HOSPADM

## 2022-08-16 RX ORDER — INSULIN LISPRO 100 [IU]/ML
0-12 INJECTION, SOLUTION INTRAVENOUS; SUBCUTANEOUS
Status: DISCONTINUED | OUTPATIENT
Start: 2022-08-16 | End: 2022-08-17 | Stop reason: HOSPADM

## 2022-08-16 RX ORDER — 0.9 % SODIUM CHLORIDE 0.9 %
1000 INTRAVENOUS SOLUTION INTRAVENOUS ONCE
Status: COMPLETED | OUTPATIENT
Start: 2022-08-16 | End: 2022-08-16

## 2022-08-16 RX ORDER — INSULIN GLARGINE 100 [IU]/ML
20 INJECTION, SOLUTION SUBCUTANEOUS ONCE
Status: COMPLETED | OUTPATIENT
Start: 2022-08-16 | End: 2022-08-16

## 2022-08-16 RX ADMIN — PANTOPRAZOLE SODIUM 40 MG: 40 TABLET, DELAYED RELEASE ORAL at 08:23

## 2022-08-16 RX ADMIN — SODIUM CHLORIDE, PRESERVATIVE FREE 10 ML: 5 INJECTION INTRAVENOUS at 00:26

## 2022-08-16 RX ADMIN — ENOXAPARIN SODIUM 30 MG: 100 INJECTION SUBCUTANEOUS at 08:24

## 2022-08-16 RX ADMIN — MORPHINE SULFATE 2 MG: 2 INJECTION, SOLUTION INTRAMUSCULAR; INTRAVENOUS at 04:19

## 2022-08-16 RX ADMIN — SODIUM CHLORIDE 1000 ML: 9 INJECTION, SOLUTION INTRAVENOUS at 01:45

## 2022-08-16 RX ADMIN — POTASSIUM CHLORIDE 20 MEQ: 29.8 INJECTION, SOLUTION INTRAVENOUS at 04:23

## 2022-08-16 RX ADMIN — SODIUM BICARBONATE 100 MEQ: 84 INJECTION, SOLUTION INTRAVENOUS at 02:45

## 2022-08-16 RX ADMIN — DEXTROSE AND SODIUM CHLORIDE: 5; 450 INJECTION, SOLUTION INTRAVENOUS at 06:21

## 2022-08-16 RX ADMIN — POTASSIUM CHLORIDE 20 MEQ: 29.8 INJECTION, SOLUTION INTRAVENOUS at 06:12

## 2022-08-16 RX ADMIN — POTASSIUM CHLORIDE 20 MEQ: 29.8 INJECTION, SOLUTION INTRAVENOUS at 10:47

## 2022-08-16 RX ADMIN — ACETAMINOPHEN 650 MG: 325 TABLET ORAL at 10:59

## 2022-08-16 RX ADMIN — SODIUM BICARBONATE: 84 INJECTION, SOLUTION INTRAVENOUS at 01:11

## 2022-08-16 RX ADMIN — INSULIN GLARGINE 20 UNITS: 100 INJECTION, SOLUTION SUBCUTANEOUS at 14:35

## 2022-08-16 RX ADMIN — SODIUM PHOSPHATE, MONOBASIC, MONOHYDRATE 15 MMOL: 276; 142 INJECTION, SOLUTION INTRAVENOUS at 06:52

## 2022-08-16 RX ADMIN — POTASSIUM CHLORIDE 20 MEQ: 29.8 INJECTION, SOLUTION INTRAVENOUS at 06:49

## 2022-08-16 RX ADMIN — INSULIN LISPRO 2 UNITS: 100 INJECTION, SOLUTION INTRAVENOUS; SUBCUTANEOUS at 17:19

## 2022-08-16 RX ADMIN — SODIUM CHLORIDE 1000 ML: 9 INJECTION, SOLUTION INTRAVENOUS at 00:42

## 2022-08-16 RX ADMIN — POTASSIUM CHLORIDE 20 MEQ: 29.8 INJECTION, SOLUTION INTRAVENOUS at 09:45

## 2022-08-16 RX ADMIN — MORPHINE SULFATE 2 MG: 2 INJECTION, SOLUTION INTRAMUSCULAR; INTRAVENOUS at 08:30

## 2022-08-16 RX ADMIN — MORPHINE SULFATE 2 MG: 2 INJECTION, SOLUTION INTRAMUSCULAR; INTRAVENOUS at 00:47

## 2022-08-16 ASSESSMENT — PAIN SCALES - GENERAL
PAINLEVEL_OUTOF10: 8
PAINLEVEL_OUTOF10: 10
PAINLEVEL_OUTOF10: 0
PAINLEVEL_OUTOF10: 10
PAINLEVEL_OUTOF10: 10
PAINLEVEL_OUTOF10: 0
PAINLEVEL_OUTOF10: 7
PAINLEVEL_OUTOF10: 0
PAINLEVEL_OUTOF10: 10
PAINLEVEL_OUTOF10: 0

## 2022-08-16 ASSESSMENT — PAIN DESCRIPTION - LOCATION
LOCATION: BACK

## 2022-08-16 ASSESSMENT — PAIN DESCRIPTION - ORIENTATION
ORIENTATION: LOWER;MID
ORIENTATION: RIGHT;LEFT;LOWER;MID

## 2022-08-16 ASSESSMENT — PAIN DESCRIPTION - DESCRIPTORS
DESCRIPTORS: ACHING;DISCOMFORT;CRAMPING
DESCRIPTORS: SHARP;STABBING

## 2022-08-16 ASSESSMENT — PAIN DESCRIPTION - PAIN TYPE: TYPE: ACUTE PAIN

## 2022-08-16 NOTE — CARE COORDINATION
social Work / Discharge Planning : SW and CM met with patient and explained role as discharge planner/ transition of care. CM team very familiar with patient due to many admissions due to DKA. Plan at discharge is HOME. Patient resides with grandmother. Independent prior to admit. Has glucometer and insulin. Follows with Dr Vu Bryant and Dr Jaimie Marcano. Patient denies HOME  needs. AWait plan. SW to follow.  Electronically signed by PERNELL Barrios on 8/16/22 at 11:01 AM EDT

## 2022-08-16 NOTE — CONSULTS
Department of Internal Medicine  Division of Pulmonary, Critical Care and Sleep Medicine  ICU Attending Addendum    Attending Physician Statement  Hakeem Zhu was seen, examined and discussed with the multi-disciplinary ICU team during rounds. I have personally seen and examined the patient and the key elements of the encounter were performed by me. The medications & laboratory data was discussed and adjusted where necessary. The radiographic images were reviewed either as a group or with radiologist if felt dis-concordant with the exam or history. The above findings were corroborated, plans confirmed and changes made if needed. Family is updated at the bedside if available. Key issues of the case were discussed among consultants. Critical Care time is documented if appropriate. Changes to the notes are place in italicized print. Briefly,   DKA, non compliance  Follow protocols    H/O C pain? Avoid narcotics        All other information is noted in the above team note.

## 2022-08-16 NOTE — PROGRESS NOTES
[facility] Progress Note    Admitting Date and Time: 8/15/2022  3:35 PM  Admit Dx: Generalized abdominal pain [R10.84]  DKA, type 1, not at goal [facility]) [E10.10]  Diabetic ketoacidosis without coma associated with other specified diabetes mellitus ([facility] [E13.10]  Diarrhea, unspecified type [R19.7]    Subjective:  Patient is being followed for Generalized abdominal pain [R10.84]  DKA, type 1, not at goal [facility]) [E10.10]  Diabetic ketoacidosis without coma associated with other specified diabetes mellitus ([facility] [E13.10]  Diarrhea, unspecified type [R19.7]     Denies any abdominal pain, nausea, or vomiting. Although she was curled up in bed    ROS: denies fever, chills, cp, sob, n/v, HA unless stated above.       pantoprazole  40 mg Oral QAM AC    enoxaparin  30 mg SubCUTAneous Daily    ketorolac  30 mg IntraVENous Once    ondansetron  4 mg IntraVENous Once    dicyclomine  20 mg IntraMUSCular Once     potassium chloride, 20 mEq, PRN  dextrose bolus, 125 mL, PRN   Or  dextrose bolus, 250 mL, PRN  magnesium sulfate, 1,000 mg, PRN  sodium phosphate IVPB, 10 mmol, PRN   Or  sodium phosphate IVPB, 15 mmol, PRN   Or  sodium phosphate IVPB, 20 mmol, PRN  dextrose 5 % and 0.45 % NaCl, , Continuous PRN  ondansetron, 4 mg, Q6H PRN  morphine, 2 mg, Q3H PRN         Objective:    /78   Pulse (!) 119   Temp 97.6 °F (36.4 °C) (Oral)   Resp 12   Ht 5' 1\" (1.549 m)   Wt 104 lb 0.9 oz (47.2 kg)   SpO2 97%   BMI 19.66 kg/m²   General Appearance: alert and oriented to person, place and time and in no acute distress  Skin: warm and dry  Head: normocephalic and atraumatic  Eyes: pupils equal, round, and reactive to light, extraocular eye movements intact, conjunctivae normal  Neck: neck supple and non tender without mass   Pulmonary/Chest: clear to auscultation bilaterally- no wheezes, rales or rhonchi, normal air movement, no respiratory distress  Cardiovascular: normal rate, normal S1 and S2 and no carotid bruits  Abdomen: soft, non-tender, non-distended, normal bowel sounds, no masses or organomegaly  Extremities: no cyanosis, no clubbing and no edema  Neurologic: no cranial nerve deficit and speech normal        Recent Labs     08/16/22  0040 08/16/22  0520 08/16/22  0827    144 143   K 4.8 4.1 3.8   * 112* 112*   CO2 3* 15* 17*   BUN 22* 15 14   CREATININE 0.7 0.5 0.5   GLUCOSE 247* 215* 155*   CALCIUM 8.8 8.5* 8.4*       Recent Labs     08/15/22  1618 08/16/22  0520   WBC 14.0* 21.6*   RBC 5.24 3.97   HGB 14.2 10.8*   HCT 43.6 32.0*   MCV 83.2 80.6   MCH 27.1 27.2   MCHC 32.6 33.8   RDW 13.7 13.1    308   MPV 11.9 10.5       Assessment:    Principal Problem:    DKA, type 1, not at goal [facility])  Active Problems:    Tachycardia    Diabetes mellitus type 1, uncontrolled (HCC)    Personal history of noncompliance with medical treatment, presenting hazards to health    Severe dehydration    Severe protein-calorie malnutrition (HCC)    Nausea and vomiting  Resolved Problems:    * No resolved hospital problems. *      Plan:    DKA - AG is 14. Currently on D5 1/2 NS and insulin drip. Patient takes lantus 22 units qAM and insulin sliding scale. Suggest transitioning to home medications as DKA is improving. Continue IVF due to metabolic acidosis. Leukocytosis, likely reactive - Negative UA and CXR. Positive UDS however patient received fentanyl in the ER  DVT prophylaxis - Lovenox      NOTE: This report was transcribed using voice recognition software. Every effort was made to ensure accuracy; however, inadvertent computerized transcription errors may be present.     Electronically signed by Judy Rodriguez DO on 8/16/2022 at 9:24 AM

## 2022-08-16 NOTE — PROCEDURES
PROCEDURE  8/16/22       Time: 0200    CENTRAL LINE INSERTION  Risks, benefits and alternatives (for applicable procedures below) described. Performed By: MIKE Lucas CNP. Indication: vascular access, poor peripheral access, and inability to gain adequate peripheral access. Informed consent: Written consent obtained. The patient was counseled regarding the procedure in person, it's indications, risks, potential complications and alternatives and any questions were answered. Consent was obtained. .  Procedure: After routine sterile preparation, local anesthesia obtained by infiltration using 2 cc of 1% Lidocaine without epinephrine. A left 3-Lumen 7F Central Venous Catheter was placed by internal jugular vein approach and secured by standard fashion. Ultrasound Guidance:   used. Number of Attempts: 1  Post-procedure Findings: A post procedural chest x-ray  was ordered and is still pending at this time. Patient tolerated the procedure well.     Electronically signed by MIKE Lucas CNP on 8/16/2022 at 2:19 AM

## 2022-08-16 NOTE — ED NOTES
Grandmother called to check in on pt and stated she was \"worried that someone may have slipped her a drug as pt is behaving worse than her normal dka episodes\". Grandmother updated that pt stable and will be admitted to ICU for DKA protocol.        Kehinde Navas RN  08/15/22 5126

## 2022-08-16 NOTE — H&P
[facility] History and Physical        Chief Complaint:  DKA  History of Present Illness   The patient is a 27 y.o. female    type I diabetic with history of DKA. +neuroapathy suspected LE  Recurrent back and pelvic pain, no fevers,  + chronic loose stools  +N/V  feels dehydrated  Hx of tachycardia       Recurrent DKA  Admits to poorly controlled diet. Lantus 22 reportedly she takes, admitss to not taking SS much   She reports that her blood sugars have been elevated. Last admission by our group 6/24:  dka- DC with lantus 22qam +SS on DC  \"Was admitted to the nausea vomiting associated with suspected viral illness. Covid and influenza negative. Patient was subsequently started on DKA protocol which continued until Gap was closed. Patient was restarted on home sub-Q basal dose and sliding scale which she tolerated without issue. Patient was still having some lower abdominal pain and dysuria however UA showed only excessive glucose without active infection. \"    Adited every 1-2 months for DKA  Back on 6/5 noted:  \"Seen by Dr. Amy Ervin with endocrinology -- wants her to continue Lantus at 22 units and sliding scale as prior with no changes for now. The patient admitted that she got tired of injecting insulin so frequently and stopped. Carmelo [place] Does have resting tachycardia -- 100 to 105. Exertional HR -- 117. BP's 130's/100 today, increased from yesterday. Consideration for outpatient beta blocker\"    - hx taken from the patient  REVIEW OF SYSTEMS:  no fevers, chills, cp, sob, n/v, ha, vision/hearing changes, wt changes, hot/cold flashes, other open skin lesions, diarrhea, constipation, dysuria/hematuria unless noted in HPI. Complete ROS performed with the patient and is otherwise negative.     Past Medical History:      Diagnosis Date    Bleeding at insertion site 01/05/2018    Common femoral artery injury, right, initial encounter 01/05/2018    Depressive disorder     Diabetic trachea midline, no obvious JVD   Resp:     Dec bs  No wheezes, No rhonchi   Chest wall:    No tenderness or deformity   Heart:    Regular rate and rhythm, S1 and S2 normal, no rub or gallop. Abdomen:     Soft, non-tender, bowel sounds active    no suspicious obvious masses/organomegaly   Genitalia & Rectal:    Deferred.    Extremities x4:   Extremities normal, atraumatic, no cyanosis, no clubbing   Musculoskeletal:      NO active synovitis or swollen b/l wrists, 2-5 MCPs examined   Skin:   Skin color, texture, turgor fdry   Lymph nodes:   Cervical nodes grossly normal   Neurologic:  .Grossly symmetric  strength in UEs and LEs with symmetric grossly intact to light touch sensation     LABS:  CBC:   Lab Results   Component Value Date/Time    WBC 14.0 08/15/2022 04:18 PM    RBC 5.24 08/15/2022 04:18 PM    HGB 14.2 08/15/2022 04:18 PM    HCT 43.6 08/15/2022 04:18 PM     08/15/2022 04:18 PM    MCV 83.2 08/15/2022 04:18 PM     BMP:    Lab Results   Component Value Date/Time     08/15/2022 08:56 PM    K 5.9 08/15/2022 08:56 PM    K 7.4 08/15/2022 04:18 PM     08/15/2022 08:56 PM    CO2 2 08/15/2022 08:56 PM    BUN 25 08/15/2022 08:56 PM    CREATININE 0.8 08/15/2022 08:56 PM    GLUCOSE 519 08/15/2022 08:56 PM    CALCIUM 9.1 08/15/2022 08:56 PM     Hepatic Function Panel:    Lab Results   Component Value Date/Time    ALKPHOS 111 08/15/2022 04:18 PM    AST 61 08/15/2022 04:18 PM    ALT 29 08/15/2022 04:18 PM    PROT 9.4 08/15/2022 04:18 PM    LABALBU 4.6 08/15/2022 04:18 PM    BILITOT 0.4 08/15/2022 04:18 PM     Magnesium:    Lab Results   Component Value Date/Time    MG 2.1 08/15/2022 08:56 PM       PT/INR:    Lab Results   Component Value Date/Time    PROTIME 10.7 12/14/2020 03:50 PM    INR 0.9 12/14/2020 03:50 PM     U/A:   Lab Results   Component Value Date/Time    NITRITE negative 04/17/2018 11:05 AM    LEUKOCYTESUR Negative 06/24/2022 06:19 PM    PHUR 6.0 06/24/2022 06:19 PM    WBCUA 0-1 06/23/2022 01:48 AM    RBCUA 0-1 06/23/2022 01:48 AM    BACTERIA NONE SEEN 06/23/2022 01:48 AM    SPECGRAV 1.010 06/24/2022 06:19 PM    BLOODU Negative 06/24/2022 06:19 PM    GLUCOSEU >=1000 06/24/2022 06:19 PM     ABG:  No results found for: PHART, PWS3KAM, PO2ART, C4QLVEMH, AVQ1VPP, BEART  TSH:    Lab Results   Component Value Date/Time    TSH 0.489 01/08/2022 12:13 AM     Cardiac Enzymes:   Lab Results   Component Value Date    CKTOTAL 99 10/18/2020    CKTOTAL 277 (H) 10/09/2020    CKTOTAL 282 (H) 10/08/2020    CKMB <1.0 10/18/2020    TROPONINI <0.01 12/14/2020    TROPONINI <0.01 11/10/2020    TROPONINI <0.01 10/18/2020       Radiology: CT ABDOMEN PELVIS W IV CONTRAST Additional Contrast? None    Result Date: 8/15/2022  EXAMINATION: CT OF THE ABDOMEN AND PELVIS WITH CONTRAST 8/15/2022 9:55 pm TECHNIQUE: CT of the abdomen and pelvis was performed with the administration of intravenous contrast. Multiplanar reformatted images are provided for review. Automated exposure control, iterative reconstruction, and/or weight based adjustment of the mA/kV was utilized to reduce the radiation dose to as low as reasonably achievable. COMPARISON: 6-22 HISTORY: ORDERING SYSTEM PROVIDED HISTORY: diarrhea TECHNOLOGIST PROVIDED HISTORY: Reason for exam:->diarrhea Additional Contrast?->None Decision Support Exception - unselect if not a suspected or confirmed emergency medical condition->Emergency Medical Condition (MA) FINDINGS: Lower Chest:  No infiltrate or effusion in the visible lower chest. Organs: Limited evaluation due to artifacts of motion and beam hardening and early contrast phase. Possible fatty liver. The renal collecting systems are mildly prominent but not clearly obstructed and similar to previously. GI/Bowel: Gastric wall prominence but not well distended for evaluation. No intestinal obstruction seen no obvious appendicitis. Relatively collapsed bowels and rectal fluid, consistent with diarrhea history.  Pelvis: Moderate free fluid dependently. Distended urinary bladder. Peritoneum/Retroperitoneum:   As described above Bones/Soft Tissues:   No acute bone or soft tissue abnormality     1. Findings consistent with diarrheal illness as described above 2. Gastric wall prominence may simply represent underdistention although gastritis is not excluded 3. Moderate pelvic free fluid, commonly physiologic for age but nonspecific     XR CHEST PORTABLE    Result Date: 8/15/2022  EXAMINATION: ONE XRAY VIEW OF THE CHEST 8/15/2022 4:25 pm COMPARISON: 06/05/2022 HISTORY: ORDERING SYSTEM PROVIDED HISTORY: fatigue TECHNOLOGIST PROVIDED HISTORY: Reason for exam:->fatigue FINDINGS: The lungs are without acute focal process. There is no effusion or pneumothorax. The cardiomediastinal silhouette is without acute process. The osseous structures are without acute process. No acute process. EKG:States tachycardia rate of 113, no ST segment elevation or depression, ME interval 144 MS, QRS 62 MS,  ms  Sinus tachycardia   Right atrial enlargement   Borderline ECG   When compared with ECG of 02-APR-2022 20:56,   Nonspecific T wave abnormality, improved in Inferior leads   Nonspecific T wave abnormality no longer evident in Lateral leads   Assessment & Plan   ACTIVE hospital problems being addressed/reassessed for this admission:  Principal Problem:    DKA, type 1, not at goal [facility])  Active Problems:    Tachycardia    Diabetes mellitus type 1, uncontrolled ([address]    Personal history of noncompliance with medical treatment, presenting hazards to health    Severe dehydration    Severe protein-calorie malnutrition (HCC)    Nausea and vomiting  Resolved Problems:    * No resolved hospital problems.  *    Code status/DVT prophylaxis and PLAN --see orders   Note extensive time spent coordinating care between ER docs, ER and floor nurses, and transitioning care over to day providers  Plan of care/ clinical impressions/communication specifics detailed below:    27 y.o. female    type I diabetic with history of DKA. +neuroapathy suspected LE  Recurrent back and pelvic pain, no fevers,  + chronic loose stools  +N/V  feels dehydrated  Hx of tachycardia       Recurrent DKA  Admits to poorly controlled diet. Lantus 22 reportedly she takes, admitss to not taking SS much   She reports that her blood sugars have been elevated. Micu, insulin SS, IV fluids etc..  Dobut UTI etc..  Gastroparesis possible- LE neuroapthy suspected  Back pain  Recurrent pelvic pain see below    Last admission by our group 6/24:  dka- DC with lantus 22qam +SS on DC  \"Was admitted to the nausea vomiting associated with suspected viral illness. Covid and influenza negative. Patient was subsequently started on DKA protocol which continued until Gap was closed. Patient was restarted on home sub-Q basal dose and sliding scale which she tolerated without issue. Patient was still having some lower abdominal pain and dysuria however UA showed only excessive glucose without active infection. \"    Adited every 1-2 months for DKA  Back on 6/5 noted:  \"Seen by Dr. Pranav Olivier with endocrinology -- wants her to continue Lantus at 22 units and sliding scale as prior with no changes for now. The patient admitted that she got tired of injecting insulin so frequently and stopped. Guerrero Gutter Guerrero Gutter Guerrero Gutter Does have resting tachycardia -- 100 to 105. Exertional HR -- 117. BP's 130's/100 today, increased from yesterday. Consideration for outpatient beta blocker\"          PMR Masternick black?  May ordered  Not done EMG of her lower extremities given her symptoms suggestive of neuropathy     follow-up with her gynecologist for her menstrual cramping which she may benefit from OCPs to better control her symptoms and reduce her nausea    Sotero Huntley MD   Night Officer, overnight admitting doctor at [facility] call day time doctor   for questions after 7:30am    Covering for [address]  If Qs please call [phone]  Electronically signed by Keli Johns MD on 8/15/2022 at 11:19 PM

## 2022-08-16 NOTE — PLAN OF CARE
Problem: Discharge Planning  Goal: Discharge to home or other facility with appropriate resources  8/16/2022 0555 by Barby San. Gema Curtis RN  Outcome: Progressing  8/16/2022 0021 by Chuck Carter RN  Outcome: Progressing     Problem: Pain  Goal: Verbalizes/displays adequate comfort level or baseline comfort level  8/16/2022 0555 by Barby San. PAGE Stoner  Outcome: Progressing  Flowsheets (Taken 8/16/2022 0400)  Verbalizes/displays adequate comfort level or baseline comfort level: Assess pain using appropriate pain scale  8/16/2022 0021 by Chuck Carter RN  Outcome: Progressing  Flowsheets (Taken 8/16/2022 0005)  Verbalizes/displays adequate comfort level or baseline comfort level:   Encourage patient to monitor pain and request assistance   Assess pain using appropriate pain scale   Administer analgesics based on type and severity of pain and evaluate response   Implement non-pharmacological measures as appropriate and evaluate response     Problem: Safety - Adult  Goal: Free from fall injury  8/16/2022 0555 by Harriette Goldmann A. Kathleene Mesa RN  Outcome: Progressing  8/16/2022 0021 by Chuck Carter RN  Outcome: Progressing

## 2022-08-16 NOTE — CONSULTS
ENDOCRINOLOGY INITIAL CONSULTATION NOTE       Date of admission: 8/15/2022  Date of service: 8/16/2022  Admitting physician: Cony Ybarra DO   Primary Care Physician: Randi Meredith DO  Consultant physician: Freda Burton MD      Reason for the consultation:  DM type 1 admitted with DKA     History of Present Illness:  Services were provided through a video synchronous discussion     Diana Gutierrez is a 27 y.o. old female with PMH of DM type 1 admitted to [facility] on 8/15/2022 because of N/V and malaise and found to be in DKA, endocrine team was consulted for diabetes management. The patient was in her usual state of health until the day of admission when started c/o worsening nausea and vomiting. She forgot to take Lantus the night before symptoms started. There is no h/o fever, chills, diarrhea, CP or Sob. Admission labs: , AG 32, Bicarb 4, Cr 0.7, K 5.6, BHB >4.5     The patient was started on insulin drip as per DKA protocol then transitioned to sq insulin      Prior to admission  Type 1 DM was diagnosed at the age 8. Prior to admission, she was basaglar 22 units daily, Humalog with meals using carb ratio 1u:10g  + ss 1:50>150. self-blood glucose monitoring has been highly variable prior to admission. She is due for annual eye exam but denied any history of diabetic retinopathy.   The patient performs her own feet care and doesn't see podiatry service  Microvascular complications:  No Retinopathy, Nephropathy + Neuropathy   Macrovascular complications: no CAD, PVD, or Stroke    Lab Results   Component Value Date/Time    LABA1C 14.6 08/16/2022 05:20 AM     Inpatient diet:   Carb Restricted diet     Point of care glucose monitoring (Independently reviewed)   Recent Labs     08/16/22  0416 08/16/22  0521 08/16/22  0622 08/16/22  0743 08/16/22  0936 08/16/22  1048 08/16/22  1218 08/16/22  1712   GLUMET 237* 205* 143* 145* 154* 153* 144* 199*     Past medical history:   Past Medical History: Diagnosis Date    Bleeding at insertion site 01/05/2018    Common femoral artery injury, right, initial encounter 01/05/2018    Depressive disorder     Diabetic ketoacidosis ([facility] [address]     DM type 1 (diabetes mellitus, type 1) ([facility] [address]     Marijuana abuse     Non-compliance     Trichimoniasis 11/19/2021       Past surgical history:  Past Surgical History:   Procedure Laterality Date    ABDOMEN SURGERY N/A 5/9/2019    INCISION AND DRAINAGE OF SUPRAPUBIC ABSESS performed by Betty Murphy MD at [address] [place] [address]      last yr       Social history:   Tobacco:   reports that she has never smoked. She has never used smokeless tobacco.  Alcohol:   reports no history of alcohol use. Drugs:   reports that she does not currently use drugs after having used the following drugs: Marijuana Megan Killian). Family history:    Family History   Problem Relation Age of Onset    Diabetes Maternal Grandmother     Diabetes Paternal Grandmother     Stroke Other     Thyroid Disease Neg Hx        Allergy and drug reactions:   No Known Allergies    Scheduled Meds:   pantoprazole  40 mg Oral QAM AC    enoxaparin  30 mg SubCUTAneous Daily    insulin lispro  0-12 Units SubCUTAneous TID WC    insulin lispro  0-4 Units SubCUTAneous Nightly    insulin lispro  1-5 Units SubCUTAneous TID WC     PRN Meds:   acetaminophen, 650 mg, Q4H PRN  ondansetron, 4 mg, Q6H PRN    Continuous Infusions:        Review of Systems  All systems reviewed.  All negative except for symptoms mentioned in HPI     OBJECTIVE    BP (!) 148/97   Pulse (!) 116   Temp 98 °F (36.7 °C) (Oral)   Resp 16   Ht 5' 1\" (1.549 m)   Wt 104 lb 0.9 oz (47.2 kg)   SpO2 99%   BMI 19.66 kg/m²     Intake/Output Summary (Last 24 hours) at 8/16/2022 1841  Last data filed at 8/16/2022 1654  Gross per 24 hour   Intake 2058 ml   Output --   Net 2058 ml     Physical examination:  Due to this being a TeleHealth encounter, evaluation of the following organ systems is limited: Vitals/Constitutional/EENT/Resp/CV/GI//MS/Neuro/Skin/Heme-Lymph-Imm. Modified physical exam through Telemedicine camera    General: Communicating well via camera   Neck: no obvious neck mass. No obvious neck deformity     CVS: no distress   Chest: no distress. Chest is moving with respiration    Extremities:  no visible tremor  Skin: No visible rashes as seen from camera   Musculoskeletal: no visible deformity  Neuro: Alert and oriented to person, place, and time. Psychiatric: Normal mood and affect. Behavior is normal    Review of Laboratory Data:  I personally reviewed the following labs:   Recent Labs     08/15/22  1618 08/16/22  0520   WBC 14.0* 21.6*   RBC 5.24 3.97   HGB 14.2 10.8*   HCT 43.6 32.0*   MCV 83.2 80.6   MCH 27.1 27.2   MCHC 32.6 33.8   RDW 13.7 13.1    308   MPV 11.9 10.5     Recent Labs     08/15/22  1618 08/15/22  1834 08/16/22  0520 08/16/22  0827 08/16/22  1202   *   < > 144 143 140   K 7.4*   < > 4.1 3.8 4.0   CL 88*   < > 112* 112* 112*   CO2 4*   < > 15* 17* 17*   BUN 25*   < > 15 14 11   CREATININE 0.7   < > 0.5 0.5 0.5   GLUCOSE 665*   < > 215* 155* 157*   CALCIUM 10.1   < > 8.5* 8.4* 7.6*   PROT 9.4*  --   --   --   --    LABALBU 4.6  --   --   --   --    BILITOT 0.4  --   --   --   --    ALKPHOS 111*  --   --   --   --    AST 61*  --   --   --   --    ALT 29  --   --   --   --     < > = values in this interval not displayed.      Beta-Hydroxybutyrate   Date Value Ref Range Status   08/15/2022 >4.50 (H) 0.02 - 0.27 mmol/L Final   06/24/2022 >4.50 (H) 0.02 - 0.27 mmol/L Final   06/23/2022 3.16 (H) 0.02 - 0.27 mmol/L Final     Lab Results   Component Value Date/Time    LABA1C 14.6 08/16/2022 05:20 AM    LABA1C 14.5 06/06/2022 03:05 AM    LABA1C 15.6 04/25/2022 12:20 AM     Lab Results   Component Value Date/Time    TSH 0.489 01/08/2022 12:13 AM     Lab Results   Component Value Date/Time    LABA1C 14.6 08/16/2022 05:20 AM    GLUCOSE 157 08/16/2022 12:02 PM    MALBCR 53.2 08/24/2021 03:09 PM    LABMICR 81.9 08/24/2021 03:09 PM    LABCREA 154 08/24/2021 03:09 PM     Lab Results   Component Value Date/Time    TRIG 337 01/08/2022 12:13 AM    HDL 48 01/08/2022 12:13 AM    LDLCALC 129 01/08/2022 12:13 AM    CHOL 244 01/08/2022 12:13 AM       Blood culture   Lab Results   Component Value Date/Time    BC 5 Days no growth 02/24/2021 10:00 AM    BC 5 Days no growth 11/10/2020 09:46 AM       Radiology:  XR CHEST PORTABLE   Preliminary Result   Left IJ central venous catheter extends to lower end of SVC. No pneumothorax. No acute cardiopulmonary abnormality. CT ABDOMEN PELVIS W IV CONTRAST Additional Contrast? None   Final Result   1. Findings consistent with diarrheal illness as described above   2. Gastric wall prominence may simply represent underdistention although   gastritis is not excluded   3. Moderate pelvic free fluid, commonly physiologic for age but nonspecific         XR CHEST PORTABLE   Final Result   No acute process. Medical Records/Labs/Images review:   I personally reviewed and summarized previous records   All labs and imaging were reviewed independently     Bob Espinoza, a 27 y.o.-old female seen today for inpatient diabetes management     Brittle type 1 DM admitted with DKA   Patient's DM is profoundly uncontrolled due to poor compliance with insulin therapy and diet  We recommend following insulin regimen  Lantus 22U daily in AM   Humalog 1u:15g of carbs to cover her meals  Medium dose sliding scale   Glucose check before meals and at bed time   Will titrate insulin dose based on blood glucose trend & insulin requirement     Recurrent DKA due to medications non-compliance   Transitioned to sq insulin yesterday. Adjusted insulin regimen as per above      Thank you for allowing us to participate in the care of this patient. Please do not hesitate to contact us with any additional questions.      Seth Freeman MD  Endocrinologist, [facility]   [address] [facility] [address]   Phone: [phone]  Fax: [phone]  --------------------------------------------  An electronic signature was used to authenticate this note.  Baljinder Escalante MD on 8/16/2022 at 6:41 PM

## 2022-08-16 NOTE — ED NOTES
Report given to ICU RN. Pt calm/cooperative, even respirations, VS updated.      Craig Bright RN  08/15/22 6329

## 2022-08-16 NOTE — PROGRESS NOTES
Reason for consult: DKA, uncontrolled type 1 DM    A1C: 14.6%     [] Not available                     Patient states the following concerns/barriers to diabetes self-management:     [x] None       [] Medication cost   [] Food cost/availability        [] Reading  [] Hearing   [] Vision                [] Work    [] Transportation  [] No insurance  [] Physical limitations    [] Other:        Patient states the following about their diabetes/health:   [x] She has been compliant with taking Lantus, but not Humalog     [x] She has not been mindful of what she is eating at home  [x] She had a pump/CGM in the past, but discontinued because she did not like being hooked to devices       Diabetes survival packet provided to:   [x] Patient     [] Other:    Information reviewed:   Definition of diabetes   Target glucose ranges/A1C   Self-monitoring of blood glucose   Prevention/symptoms/treatment of hypo-/hyperglycemia   Medication adherence   The plate method/meal planning guidelines   The benefits of exercise and recommendations   Reducing the risk of chronic complications            Post-education Assessment  [x]  Attentive to teaching  [x]  Answered questions appropriately when asked   [x]  Seems able to apply concepts to daily lifestyle  [x]  Seems motivated to do well  [x]  Verbalized an understanding of target glucose ranges/A1C level  [x]  Expresses an intent to comply with treatment plan   []  Showed very little interest in complying with treatment plan   []  Seems to have trouble applying concepts to daily lifestyle       COMMENTS:  Patient known to me from previous admissions for DKA. Today, she is very positive and has developed a plan to better self-manage her diabetes. She tells me that the nurse caring for her last night wore an Omnipod and showed her how there are no wires attached. She is very interested in talking to Dr. Diana Sahu about going back to an insulin pump using the Omnipod and restarting a CGM. She states that she knows she needs to be better about her diet. She knows how to count carbohydrates, but has not been doing it. Encouraged her once again to come meet with an outpatient diabetes dietitian for MNT/individualized meal planning, especially ahead of restarting her insulin pump. She is agreeable. 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 Recommendations:   [x] Carbohydrate-controlled diet    [] Script for glucometer and supplies (per preference of patient's insurance)  [] Script for insulin pens and pen needles (if insulin is ordered at discharge for home use)   [] Consult to social work: patient has no insurance or has financial hardship  [x] Inpatient consult to endocrinologist   [x] Follow up with endocrinologist as an outpatient   [] Home healthcare nursing to increase compliance to treatment plan   [x] Script for outpatient diabetes education classes (from doctor)        Thank you for this consult.     Vani Cruz, RN, BSN, Edi Castaneda

## 2022-08-16 NOTE — PATIENT CARE CONFERENCE
Intensive Care Daily Quality Rounding Checklist    ICU Team Members: Dr. Kya Bland, Resident, Charge nurse, Bedside nurse, Respiratory therapist, Clinical pharmacist    ICU Day #: NUMBER: 1    Intubation Date: N/A     Ventilator Day #: N/A    Central Line Insertion Date: August 16        Day #: NUMBER: 1     Arterial Line Insertion Date:  N/A     Day #: N/A    Temporary Hemodialysis Catheter Insertion Date:  N/A    Day #  N/A    DVT Prophylaxis: Lovenox    GI Prophylaxis: Protonix    Bey Catheter Insertion Date: N/A       Day #: N/A    Continued need (if yes, reason documented and discussed with physician): N/A    Skin Issues/ Wounds and ordered treatment discussed on rounds: No    Goals/ Plans for the Day: Monitor labs and vitals, and treat as needed. Bridge to SQ insulin; Diabetes management. Pain management.

## 2022-08-16 NOTE — ED NOTES
Assumed care of pt. Pt arrives d/t elevated blood sugar reading to high to read. Pt lethargic c/o intense lower central back pain. States she gets DKA regularly and her sx are consistent with previous episodes. IV/labs sent, fluids started, ekg done. Pt requesting pain meds. Dr aware. Pt vomiting regularly on the floor. Given emesis bags.       Lorene Dsouza RN  08/15/22 2031

## 2022-08-16 NOTE — CONSULTS
Critical Care Admit/Consult Note         Patient - Ernestina Watkins   MRN -  [id number]   [place] # - [de-identified]   - 1995      Date of Admission -  8/15/2022  3:35 PM  Date of evaluation -  2022  [place] [facility] Day - 1            ADMIT/CONSULT DETAILS     Reason for Admit/Consult   DKA    Consulting Service/Physician   Consulting - Wood Beavers DO  Primary Care Physician - Rui Arthur,          HPI   The patient is a 27 y.o. female with significant past medical history of poorly controlled type 1 DM, depression, marijuana abuse, trichomoniasis, documented history of medical noncompliance. Patient was seen and examined here in ICU. Patient came in 8/15 approximately 1530. Patient states she woke up this morning and is having severe nausea and vomiting. States that her blood sugar has been high. Complaining of severe lower back pain that radiates to the flanks. CT abdomen pelvis with IV contrast was done in ED and unremarkable. She states that this back pain is associated with her DKA. [place] and her [place] has been poor. States she is drinking enough water.          Past Medical History         Diagnosis Date    Bleeding at insertion site 2018    Common femoral artery injury, right, initial encounter 2018    Depressive disorder     Diabetic ketoacidosis ([address]     DM type 1 (diabetes mellitus, type 1) ([address]     Marijuana abuse     Non-compliance     Trichimoniasis 2021        Past Surgical History           Procedure Laterality Date    ABDOMEN SURGERY N/A 2019    INCISION AND DRAINAGE OF SUPRAPUBIC ABSESS performed by Marla Lino MD at [address] [place] [address]      last yr         Current Medications   Current Medications    ketorolac  30 mg IntraVENous Once    ondansetron  4 mg IntraVENous Once    sodium chloride flush        sodium chloride  1,000 mL IntraVENous Once    dicyclomine  20 mg IntraMUSCular Once dextrose bolus **OR** dextrose bolus, potassium chloride, magnesium sulfate, sodium phosphate IVPB **OR** sodium phosphate IVPB **OR** sodium phosphate IVPB, dextrose 5 % and 0.45 % NaCl, ondansetron, morphine  IV Drips/Infusions   sodium chloride 250 mL/hr at 08/15/22 2020    dextrose 5 % and 0.45 % NaCl      insulin 0.05 Units/kg/hr (08/15/22 2239)     Home Medications  Medications Prior to Admission: glucose 4g chewable tablet, Take 4 tablets by mouth as needed for Low blood sugar  Insulin Pen Needle (BD PEN NEEDLE SHELBIE U/F) 32G X 4 MM MISC, Uses with insulin 4 times a day  insulin glargine (LANTUS SOLOSTAR) 100 UNIT/ML injection pen, Inject 22 Units into the skin every morning  insulin lispro, 1 Unit Dial, (HUMALOG KWIKPEN) 100 UNIT/ML SOPN, Inject 1 units per 10 grams of carbs + following sliding scale. -200 add 2U, -250 add 4U, -300 add 6U, -350 add 8U, -400 add 12U, BS over 400 add 12U    Diet/Nutrition   Diet NPO Exceptions are: Ice Chips, Sips of Water with Meds, Sips of Clear Liquids    Allergies   Patient has no known allergies. Social History   Tobacco   reports that she has never smoked. She has never used smokeless tobacco.    Alcohol     reports no history of alcohol use.     Occupational history :    Family History         Problem Relation Age of Onset    Diabetes Maternal Grandmother     Diabetes Paternal Grandmother     Stroke Other     Thyroid Disease Neg Hx        Sleep History   n/a    ROS     REVIEW OF SYSTEMS:  CONSTITUTIONAL:  positive for  fatigue  EYES:  negative  HEENT:  negative  RESPIRATORY:  negative  CARDIOVASCULAR:  negative  GASTROINTESTINAL:  positive for nausea, vomiting, and diarrhea  GENITOURINARY:  negative  INTEGUMENT/BREAST:  negative  HEMATOLOGIC/LYMPHATIC:  negative  ALLERGIC/IMMUNOLOGIC:  negative  ENDOCRINE:  negative  MUSCULOSKELETAL:  negative  NEUROLOGICAL:  negative  BEHAVIOR/PSYCH:  positive for agitated, increased agitation, and anxiety    Lines and Devices        Mechanical Ventilation Data   VENT SETTINGS (Comprehensive)     Additional Respiratory Assessments  Heart Rate: (!) 105  Resp: 24  SpO2: 100 %    ABG  Lab Results   Component Value Date/Time    PH 7.282 11/10/2020 10:38 AM    PCO2 47.5 11/10/2020 10:38 AM    PO2 20.8 11/10/2020 10:38 AM    HCO3 21.9 11/10/2020 10:38 AM    O2SAT 26.5 11/10/2020 10:38 AM     Lab Results   Component Value Date/Time    MODE RA 11/10/2020 10:38 AM           Vitals    height is 5' 1\" (1.549 m) and weight is 110 lb (49.9 kg). Her oral temperature is 97.5 °F (36.4 °C). Her blood pressure is 107/56 (abnormal) and her pulse is 105 (abnormal). Her respiration is 24 and oxygen saturation is 100%. Temperature Range: Temp: 97.5 °F (36.4 °C) Temp  Av.5 °F (36.4 °C)  Min: 97.5 °F (36.4 °C)  Max: 97.5 °F (36.4 °C)  BP Range:  Systolic (85VKD), OQQ:704 , Min:89 , RKL:493     Diastolic (28OKP), TLH:20, Min:56, Max:91    Pulse Range: Pulse  Av.9  Min: 100  Max: 112  Respiration Range: Resp  Av.5  Min: 16  Max: 26  Current Pulse Ox[de-identified]  SpO2: 100 %  24HR Pulse Ox Range:  SpO2  Av.8 %  Min: 95 %  Max: 100 %  Oxygen Amount and Delivery: O2 Flow Rate (L/min): 2 L/min      I/O (24 Hours)    Patient Vitals for the past 8 hrs:   BP Pulse Resp SpO2 Height Weight   08/15/22 2345 (!) 107/56 (!) 105 -- 100 % -- --   08/15/22 2230 (!) 10756 (!) 103 24 100 % -- --   08/15/22 2154 (!) 101 -- -- -- -- --   08/15/22 2137 (!) 101 (!) 111 -- -- -- --   08/15/22 2117 89/63 (!) 111 16 95 % -- --   08/15/22 2036 (!) 147/ (!) 111 -- -- -- --   08/15/22 2032 (!) 147 (!) 112 -- -- -- --   08/15/22 1904 (!) 147 (!) 110 26 100 % -- --   08/15/22 1829 -- -- -- -- 5' 1\" (1.549 m) 110 lb (49.9 kg)     No intake or output data in the 24 hours ending 22 0011  No intake/output data recorded.      Patient Vitals for the past 96 hrs (Last 3 readings):   Weight   08/15/22 1829 110 lb (49.9 kg)   08/15/22 1540 108 lb (49 kg)         Drains/Tubes Outputs    Exam         PHYSICAL EXAM:  CONSTITUTIONAL:    Patient is alert and oriented. Patient is calling and very verbal  EYES:    Lids and lashes normal, pupils equal, round and reactive to light  ENT:    Normocephalic, without obvious abnormality, atraumatic, sinuses nontender on palpation, external ears without lesions, moist mucus membranes  NECK:    Supple, symmetrical, without abnormalities  LUNGS:    Tachypneic, good air exchange, clear to auscultation bilaterally, no crackles or wheezing  CARDIOVASCULAR:    Normal apical impulse, tachycardic with regular rhythm, normal S1 and S2, no S3 or S4, and no murmur noted  ABDOMEN:    No scars, normal bowel sounds, soft, non-distended, non-tender, no masses palpated  GENITAL/URINARY:    Refuses Bey catheter  MUSCULOSKELETAL:    There is no redness, warmth, or swelling of the joints. Full range of motion noted. Motor strength is 5 out of 5 all extremities bilaterally. Tone is normal.  NEUROLOGIC:    Awake, alert, oriented to name, place and time. Cranial nerves II-XII are grossly intact. Motor is 5 out of 5 bilaterally.   SKIN:    no bruising or bleeding    Data   Old records and images have been reviewed    Lab Results   CBC     Lab Results   Component Value Date/Time    WBC 14.0 08/15/2022 04:18 PM    RBC 5.24 08/15/2022 04:18 PM    HGB 14.2 08/15/2022 04:18 PM    HCT 43.6 08/15/2022 04:18 PM     08/15/2022 04:18 PM    MCV 83.2 08/15/2022 04:18 PM    MCH 27.1 08/15/2022 04:18 PM    MCHC 32.6 08/15/2022 04:18 PM    RDW 13.7 08/15/2022 04:18 PM    NRBC 0.0 05/10/2019 09:30 AM    SEGSPCT 58 09/29/2013 03:30 PM    METASPCT 1.0 04/29/2020 04:06 PM    LYMPHOPCT 16.8 08/15/2022 04:18 PM    MONOPCT 1.9 08/15/2022 04:18 PM    MYELOPCT 1.0 04/29/2020 04:06 PM    BASOPCT 0.3 08/15/2022 04:18 PM    MONOSABS 0.27 08/15/2022 04:18 PM    LYMPHSABS 2.35 08/15/2022 04:18 PM    EOSABS 0.00 08/15/2022 04:18 PM    BASOSABS 0.04 08/15/2022 04:18 PM       BMP   Lab Results   Component Value Date/Time     08/15/2022 08:56 PM    K 5.9 08/15/2022 08:56 PM    K 7.4 08/15/2022 04:18 PM     08/15/2022 08:56 PM    CO2 2 08/15/2022 08:56 PM    BUN 25 08/15/2022 08:56 PM    CREATININE 0.8 08/15/2022 08:56 PM    GLUCOSE 519 08/15/2022 08:56 PM    CALCIUM 9.1 08/15/2022 08:56 PM       LFTS  Lab Results   Component Value Date/Time    ALKPHOS 111 08/15/2022 04:18 PM    ALT 29 08/15/2022 04:18 PM    AST 61 08/15/2022 04:18 PM    PROT 9.4 08/15/2022 04:18 PM    BILITOT 0.4 08/15/2022 04:18 PM    BILIDIR <0.2 06/24/2022 05:45 PM    IBILI see below 06/24/2022 05:45 PM    LABALBU 4.6 08/15/2022 04:18 PM       INR  No results for input(s): PROTIME, INR in the last 72 hours. APTT  No results for input(s): APTT in the last 72 hours. Lactic Acid  Lab Results   Component Value Date/Time    LACTA 2.2 06/05/2022 03:32 PM    LACTA 1.8 04/24/2022 04:03 PM    LACTA 1.3 04/02/2022 09:04 PM        BNP   No results for input(s): BNP in the last 72 hours. Cultures     No results for input(s): BC in the last 72 hours. No results for input(s): Clarnce Lax in the last 72 hours. No results for input(s): LABURIN in the last 72 hours. Radiology   CXR  Lungs are clear without acute focal process      CT Scans: Abd/Pelvis w IV contrast   1. Findings consistent with diarrheal illness as described above   2. Gastric wall prominence may simply represent underdistention although   gastritis is not excluded   3. Moderate pelvic free fluid, commonly physiologic for age but nonspecific       SYSTEMS ASSESSMENT    Neuro   # Depression/psychiatric issues  - No home meds  - Concern for drug use and drug seeking tendencies. Will order UDS. However patient already received fentanyl in ED. Respiratory   # No active problems  Wean oxygen as tolerated.  Keep O2 sat 90-92%    Cardiovascular   # Tachycardia  - In the presence of dehydration  - Give 1 more liter of fluid  - EKG unremarkable, troponin negative     Gastrointestinal   - NPO while on insulin drip    # Lower back/flank pain  - CT abdomen with no acute abnormalities, check UA  - She states this is normal while she is in DKA, she received fentanyl x2 doses in ED. She can continue with the morphine that is ordered    Renal   # Anion gap metabolic acidosis  - In the setting of DKA, treatment as noted below    # Hyperkalemia  -Current potassium 5.9, no EKG changes, currently on insulin drip and sodium bicarbonate drip  - Recheck labs, watch for arrhythmias. Infectious Disease   # No active problems  - Lactic acid 1.4, will check procalcitonin, mild leukocytosis    Hematology/Oncology   # No active problems    Endocrine   # DKA in the setting of noncompliant type I diabetic  - Most likely in the setting of recent nausea and vomiting [place]  - Initial , anion gap 32, CO2 4, venous pH 7.02, beta hydroxybutyrate greater than 4.5.  - Patient was given 2 L of fluid in ED as well as started on an insulin drip  - On arrival to the unit patient's BGL is below 250 will start we will start bicarb drip in dextrose at 150 mL/hr until CO2 increases. 100 mEq HCO3 IV push now.   -Every 4 BMP, mag, Phos    Social/Spiritual/DNR/Other   Code Status: Full  DVT prophylaxis:   PPI: Protonix       Discussed case with pulm/crit attending   Electronically signed by MIKE Gomez CNP on 8/16/2022 at 1:17 AM      \"Thank you for asking us to see this complex patient. \"    Total critical care time caring for this patient with life threatening, unstable organ failure, including direct patient contact, management of life support systems, review of data including imaging and labs, discussions with other team members and physicians at least 39  Min so far today, excluding procedures. Please feel free to call with questions or concerns.

## 2022-08-17 VITALS
SYSTOLIC BLOOD PRESSURE: 134 MMHG | HEART RATE: 111 BPM | BODY MASS INDEX: 21.37 KG/M2 | RESPIRATION RATE: 15 BRPM | OXYGEN SATURATION: 100 % | DIASTOLIC BLOOD PRESSURE: 103 MMHG | TEMPERATURE: 99 F | WEIGHT: 113.2 LBS | HEIGHT: 61 IN

## 2022-08-17 DIAGNOSIS — E10.65 TYPE 1 DIABETES MELLITUS WITH HYPERGLYCEMIA (HCC): Primary | ICD-10-CM

## 2022-08-17 LAB
METER GLUCOSE: 219 MG/DL (ref 74–99)
METER GLUCOSE: 264 MG/DL (ref 74–99)
MRSA CULTURE ONLY: NORMAL

## 2022-08-17 PROCEDURE — 6370000000 HC RX 637 (ALT 250 FOR IP): Performed by: INTERNAL MEDICINE

## 2022-08-17 PROCEDURE — 99239 HOSP IP/OBS DSCHRG MGMT >30: CPT | Performed by: INTERNAL MEDICINE

## 2022-08-17 PROCEDURE — 82962 GLUCOSE BLOOD TEST: CPT

## 2022-08-17 PROCEDURE — 99231 SBSQ HOSP IP/OBS SF/LOW 25: CPT | Performed by: INTERNAL MEDICINE

## 2022-08-17 RX ORDER — INSULIN GLARGINE 100 [IU]/ML
22 INJECTION, SOLUTION SUBCUTANEOUS NIGHTLY
Qty: 10 PEN | Refills: 3
Start: 2022-08-17 | End: 2022-08-17 | Stop reason: SDUPTHER

## 2022-08-17 RX ORDER — INSULIN GLARGINE 100 [IU]/ML
22 INJECTION, SOLUTION SUBCUTANEOUS NIGHTLY
Qty: 10 PEN | Refills: 3 | Status: SHIPPED | OUTPATIENT
Start: 2022-08-17 | End: 2022-08-17 | Stop reason: SDUPTHER

## 2022-08-17 RX ORDER — INSULIN GLARGINE 100 [IU]/ML
22 INJECTION, SOLUTION SUBCUTANEOUS DAILY
Status: DISCONTINUED | OUTPATIENT
Start: 2022-08-17 | End: 2022-08-17 | Stop reason: HOSPADM

## 2022-08-17 RX ORDER — INSULIN GLARGINE 100 [IU]/ML
22 INJECTION, SOLUTION SUBCUTANEOUS NIGHTLY
Qty: 10 PEN | Refills: 3 | Status: ON HOLD
Start: 2022-08-17 | End: 2022-10-05 | Stop reason: SDUPTHER

## 2022-08-17 RX ADMIN — INSULIN LISPRO 4 UNITS: 100 INJECTION, SOLUTION INTRAVENOUS; SUBCUTANEOUS at 11:47

## 2022-08-17 RX ADMIN — INSULIN GLARGINE 22 UNITS: 100 INJECTION, SOLUTION SUBCUTANEOUS at 11:47

## 2022-08-17 RX ADMIN — PANTOPRAZOLE SODIUM 40 MG: 40 TABLET, DELAYED RELEASE ORAL at 06:10

## 2022-08-17 RX ADMIN — INSULIN LISPRO 1 UNITS: 100 INJECTION, SOLUTION INTRAVENOUS; SUBCUTANEOUS at 08:02

## 2022-08-17 RX ADMIN — INSULIN LISPRO 6 UNITS: 100 INJECTION, SOLUTION INTRAVENOUS; SUBCUTANEOUS at 06:13

## 2022-08-17 RX ADMIN — INSULIN LISPRO 2 UNITS: 100 INJECTION, SOLUTION INTRAVENOUS; SUBCUTANEOUS at 11:48

## 2022-08-17 RX ADMIN — ACETAMINOPHEN 650 MG: 325 TABLET ORAL at 02:31

## 2022-08-17 ASSESSMENT — PAIN SCALES - WONG BAKER: WONGBAKER_NUMERICALRESPONSE: 2

## 2022-08-17 ASSESSMENT — PAIN DESCRIPTION - DESCRIPTORS: DESCRIPTORS: BURNING

## 2022-08-17 ASSESSMENT — PAIN SCALES - GENERAL
PAINLEVEL_OUTOF10: 5
PAINLEVEL_OUTOF10: 1

## 2022-08-17 ASSESSMENT — PAIN DESCRIPTION - LOCATION: LOCATION: THROAT

## 2022-08-17 NOTE — PROGRESS NOTES
ENDOCRINOLOGY PROGRESS   Via Tele-Health Service      Date of admission: 8/15/2022  Date of service: 8/17/2022  Admitting physician: Chadd Alcantara DO   Primary Care Physician: Matthew El DO  Consultant physician: Otilia Singleton MD      Reason for the consultation:  DM type 1 admitted with DKA     History of Present Illness:  Services were provided through a video synchronous discussion     Ethyl Expose is a 27 y.o. old female with PMH of DM type 1 admitted to [facility] on 8/15/2022 because of N/V and malaise and found to be in DKA, endocrine team was consulted for diabetes management     Subjective   Seen this AM, no acute events, BG improving but still above goal     Inpatient diet:   Carb Restricted diet     Point of care glucose monitoring (Independently reviewed)   Recent Labs     08/16/22  0622 08/16/22  0743 08/16/22  0936 08/16/22  1048 08/16/22  1218 08/16/22  1712 08/16/22  2011 08/17/22  0606   GLUMET 143* 145* 154* 153* 144* 199* 142* 264*     Scheduled Meds:   pantoprazole  40 mg Oral QAM AC    enoxaparin  30 mg SubCUTAneous Daily    insulin lispro  0-12 Units SubCUTAneous TID WC    insulin lispro  0-4 Units SubCUTAneous Nightly    insulin lispro  1-5 Units SubCUTAneous TID WC     PRN Meds:   acetaminophen, 650 mg, Q4H PRN  ondansetron, 4 mg, Q6H PRN    Continuous Infusions:      Review of Systems  All systems reviewed. All negative except for symptoms mentioned in HPI     OBJECTIVE    BP (!) 137/92   Pulse (!) 111   Temp 98.2 °F (36.8 °C) (Oral)   Resp 20   Ht 5' 1\" (1.549 m)   Wt 113 lb 3.2 oz (51.3 kg)   SpO2 100%   BMI 21.39 kg/m²   No intake or output data in the 24 hours ending 08/17/22 5197    Physical examination:  Due to this being a TeleHealth encounter, evaluation of the following organ systems is limited: Vitals/Constitutional/EENT/Resp/CV/GI//MS/Neuro/Skin/Heme-Lymph-Imm.     Modified physical exam through Telemedicine camera    General: Communicating well via camera   Neck: no obvious neck mass. No obvious neck deformity     CVS: no distress   Chest: no distress. Chest is moving with respiration    Extremities:  no visible tremor  Skin: No visible rashes as seen from camera   Musculoskeletal: no visible deformity  Neuro: Alert and oriented to person, place, and time. Psychiatric: Normal mood and affect. Behavior is normal    Review of Laboratory Data:  I personally reviewed the following labs:   Recent Labs     08/15/22  1618 08/16/22  0520   WBC 14.0* 21.6*   RBC 5.24 3.97   HGB 14.2 10.8*   HCT 43.6 32.0*   MCV 83.2 80.6   MCH 27.1 27.2   MCHC 32.6 33.8   RDW 13.7 13.1    308   MPV 11.9 10.5     Recent Labs     08/15/22  1618 08/15/22  1834 08/16/22  0520 08/16/22  0827 08/16/22  1202   *   < > 144 143 140   K 7.4*   < > 4.1 3.8 4.0   CL 88*   < > 112* 112* 112*   CO2 4*   < > 15* 17* 17*   BUN 25*   < > 15 14 11   CREATININE 0.7   < > 0.5 0.5 0.5   GLUCOSE 665*   < > 215* 155* 157*   CALCIUM 10.1   < > 8.5* 8.4* 7.6*   PROT 9.4*  --   --   --   --    LABALBU 4.6  --   --   --   --    BILITOT 0.4  --   --   --   --    ALKPHOS 111*  --   --   --   --    AST 61*  --   --   --   --    ALT 29  --   --   --   --     < > = values in this interval not displayed.      Beta-Hydroxybutyrate   Date Value Ref Range Status   08/15/2022 >4.50 (H) 0.02 - 0.27 mmol/L Final   06/24/2022 >4.50 (H) 0.02 - 0.27 mmol/L Final   06/23/2022 3.16 (H) 0.02 - 0.27 mmol/L Final     Lab Results   Component Value Date/Time    LABA1C 14.6 08/16/2022 05:20 AM    LABA1C 14.5 06/06/2022 03:05 AM    LABA1C 15.6 04/25/2022 12:20 AM     Lab Results   Component Value Date/Time    TSH 0.489 01/08/2022 12:13 AM     Lab Results   Component Value Date/Time    LABA1C 14.6 08/16/2022 05:20 AM    GLUCOSE 157 08/16/2022 12:02 PM    MALBCR 53.2 08/24/2021 03:09 PM    LABMICR 81.9 08/24/2021 03:09 PM    LABCREA 154 08/24/2021 03:09 PM     Lab Results   Component Value Date/Time    TRIG 337 01/08/2022 12:13 AM HDL 48 01/08/2022 12:13 AM    LDLCALC 129 01/08/2022 12:13 AM    CHOL 244 01/08/2022 12:13 AM       Blood culture   Lab Results   Component Value Date/Time    BC 24 Hours no growth 08/15/2022 04:18 PM    BC 5 Days no growth 02/24/2021 10:00 AM       Radiology:  XR CHEST PORTABLE   Final Result   Left IJ central venous catheter extends to lower end of SVC. No pneumothorax. No acute cardiopulmonary abnormality. CT ABDOMEN PELVIS W IV CONTRAST Additional Contrast? None   Final Result   1. Findings consistent with diarrheal illness as described above   2. Gastric wall prominence may simply represent underdistention although   gastritis is not excluded   3. Moderate pelvic free fluid, commonly physiologic for age but nonspecific         XR CHEST PORTABLE   Final Result   No acute process. Medical Records/Labs/Images review:   I personally reviewed and summarized previous records   All labs and imaging were reviewed independently     Bob Espinoza, a 27 y.o.-old female seen today for inpatient diabetes management     Brittle type 1 DM admitted with DKA   Patient's DM is profoundly uncontrolled due to poor compliance with insulin therapy and diet  We recommend following insulin regimen  Lantus 22U daily in AM   Humalog 1u:15g of carbs to cover her meals  Medium dose sliding scale   Glucose check before meals and at bed time   Will titrate insulin dose based on blood glucose trend & insulin requirement     Recurrent DKA due to medications non-compliance   Transitioned to sq insulin yesterday. Adjusted insulin regimen as per above      Thank you for allowing us to participate in the care of this patient. Please do not hesitate to contact us with any additional questions.      Дмитрий Stephenson MD  Endocrinologist, [facility]   [address] [place] [id number]   Phone: [phone]  Fax: [phone]  --------------------------------------------  An electronic signature was used to authenticate this note.  Enrico Sterling MD on 8/17/2022 at 6:29 AM

## 2022-08-17 NOTE — CARE COORDINATION
Tx from ICU follows with dr Morelia Kauffman. Has necessary DM supplies at home. Resides with grandmother. Will follow plan is home at discharge, no needs. Eryn Duty.

## 2022-08-17 NOTE — PROGRESS NOTES
Nutrition Note    To aid in insulin dosing per carbohydrate grams at meals, the following table can be used for estimates: The following foods have 15 grams carbohydrate each:  Grains  1 slice bread (1 ounce)  1 small tortilla (6-inch size)  ¼ large bagel (1 ounce)  1/3 cup pasta or rice (cooked)  ½ hamburger or hot dog bun (¾ ounce)  ½ cup cooked cereal  ½ to ¾ cup ready-to-eat cereal  2 taco shells (5-inch size) Fruit  1 small fresh fruit (¾ to 1 cup)  ½ medium banana  17 small grapes (3 ounces)  1 cup melon or berries  ½ cup canned or frozen fruit  2 tablespoons dried fruit (blueberries, cherries, cranberries, raisins)  ½ cup unsweetened fruit juice   Starchy Vegetables  ½ cup cooked beans, peas, corn, potatoes/sweet potatoes  ¼ large baked potato (3 ounces)  1 cup acorn or butternut squash Snack Foods  3 to 6 crackers  8 potato chips or 13 tortilla chips (¾ ounce to 1 ounce)  3 cups popped popcorn   Dairy  3/4 cup (6 ounces) nonfat plain yogurt, or yogurt with sugar-free sweetener  1 cup milk  1 cup plain rice, soy, coconut or flavored almond milk Sweets and Desserts  ½ cup ice cream or frozen yogurt  1 tablespoon jam, jelly, pancake syrup, table sugar, or honey  2 tablespoons light pancake syrup  1 inch square of frosted cake or 2 inch square of unfrosted cake  2 small cookies (2/3 ounce each) or ¼ large cookie   Sometimes you'll have to estimate carbohydrate amounts if you don't know the exact recipe. One cup of mixed foods like soups can have 1 to 2 carbohydrate servings, while some casseroles might have 2 or more servings of carbohydrate. Foods that have less than 20 calories in each serving can be counted as free foods. Count 1 cup raw vegetables, or ½ cup cooked non-starchy vegetables as free foods. If you eat 3 or more servings at one meal, then count them as 1 carbohydrate serving.         A full nutrition assessment will follow per Policy      Electronically signed by Karen Mcintosh RD, ANGELA, TALI on 8/17/22 at 11:51 AM EDT

## 2022-08-17 NOTE — DISCHARGE SUMMARY
[facility] Physician Discharge Summary       Nikunj Camilo [place] [phone]    Schedule an appointment as soon as possible for a visit  follow up    Wilber Dimas MD  [address] [facility] [address] [phone]    Schedule an appointment as soon as possible for a visit  follow up      Activity level: As tolerated     Dispo: Home      Condition on discharge: Stable     Patient ID:  Elizabeth Meza  [id number]  27 y.o.  1995    Admit date: 8/15/2022    Discharge date and time:  8/17/2022  2:58 PM    Admission Diagnoses: Principal Problem:    DKA, type 1, not at goal [facility])  Active Problems:    Tachycardia    Diabetes mellitus type 1, uncontrolled ([facility]    Medical non-compliance    Severe dehydration    Diabetic acidosis without coma (HCC)    Severe protein-calorie malnutrition (HCC)    Nausea and vomiting  Resolved Problems:    * No resolved hospital problems. *      Discharge Diagnoses: Principal Problem:    DKA, type 1, not at goal [facility])  Active Problems:    Tachycardia    Diabetes mellitus type 1, uncontrolled ([facility]    Medical non-compliance    Severe dehydration    Diabetic acidosis without coma (HCC)    Severe protein-calorie malnutrition (HCC)    Nausea and vomiting  Resolved Problems:    * No resolved hospital problems. *      Consults:  IP CONSULT TO DIABETES EDUCATOR  IP CONSULT TO SOCIAL WORK  IP CONSULT TO ENDOCRINOLOGY    Procedures: None    Hospital Course:   Patient Elizabeth Mzea is a 27 y.o. presented with Generalized abdominal pain [R10.84]  DKA, type 1, not at goal [facility]) [E10.10]  Diabetic ketoacidosis without coma associated with other specified diabetes mellitus ([facility] [E13.10]  Diarrhea, unspecified type [R237]    27year old female presents with hyperglycemia. She was started on IVF as well as insulin drip. Electrolytes were monitored. She was seen by endocrinology and medications adjusted.  Patient is medically stable for discharge. Discharge Exam:    General Appearance: alert and oriented to person, place and time and in no acute distress  Skin: warm and dry  Head: normocephalic and atraumatic  Eyes: pupils equal, round, and reactive to light, extraocular eye movements intact, conjunctivae normal  Neck: neck supple and non tender without mass   Pulmonary/Chest: clear to auscultation bilaterally- no wheezes, rales or rhonchi, normal air movement, no respiratory distress  Cardiovascular: normal rate, normal S1 and S2 and no carotid bruits  Abdomen: soft, non-tender, non-distended, normal bowel sounds, no masses or organomegaly  Extremities: no cyanosis, no clubbing and no edema  Neurologic: no cranial nerve deficit and speech normal    I/O last 3 completed shifts: In: 2058 [I.V.:2058]  Out: -   No intake/output data recorded. LABS:  Recent Labs     08/16/22  0520 08/16/22  0827 08/16/22  1202    143 140   K 4.1 3.8 4.0   * 112* 112*   CO2 15* 17* 17*   BUN 15 14 11   CREATININE 0.5 0.5 0.5   GLUCOSE 215* 155* 157*   CALCIUM 8.5* 8.4* 7.6*       Recent Labs     08/15/22  1618 08/16/22  0520   WBC 14.0* 21.6*   RBC 5.24 3.97   HGB 14.2 10.8*   HCT 43.6 32.0*   MCV 83.2 80.6   MCH 27.1 27.2   MCHC 32.6 33.8   RDW 13.7 13.1    308   MPV 11.9 10.5       No results for input(s): POCGLU in the last 72 hours. Imaging:  CT ABDOMEN PELVIS W IV CONTRAST Additional Contrast? None    Result Date: 8/15/2022  EXAMINATION: CT OF THE ABDOMEN AND PELVIS WITH CONTRAST 8/15/2022 9:55 pm TECHNIQUE: CT of the abdomen and pelvis was performed with the administration of intravenous contrast. Multiplanar reformatted images are provided for review. Automated exposure control, iterative reconstruction, and/or weight based adjustment of the mA/kV was utilized to reduce the radiation dose to as low as reasonably achievable.  COMPARISON: 6-22 HISTORY: ORDERING SYSTEM PROVIDED HISTORY: diarrhea TECHNOLOGIST PROVIDED HISTORY: Reason for exam:->diarrhea Additional Contrast?->None Decision Support Exception - unselect if not a suspected or confirmed emergency medical condition->Emergency Medical Condition (MA) FINDINGS: Lower Chest:  No infiltrate or effusion in the visible lower chest. Organs: Limited evaluation due to artifacts of motion and beam hardening and early contrast phase. Possible fatty liver. The renal collecting systems are mildly prominent but not clearly obstructed and similar to previously. GI/Bowel: Gastric wall prominence but not well distended for evaluation. No intestinal obstruction seen no obvious appendicitis. Relatively collapsed bowels and rectal fluid, consistent with diarrhea history. Pelvis: Moderate free fluid dependently. Distended urinary bladder. Peritoneum/Retroperitoneum:   As described above Bones/Soft Tissues:   No acute bone or soft tissue abnormality     1. Findings consistent with diarrheal illness as described above 2. Gastric wall prominence may simply represent underdistention although gastritis is not excluded 3. Moderate pelvic free fluid, commonly physiologic for age but nonspecific     XR CHEST PORTABLE    Result Date: 8/16/2022  EXAMINATION: ONE XRAY VIEW OF THE CHEST PORTABLE UPRIGHT AP VIEW 8/16/2022 1:22 am COMPARISON: Portable chest x-ray on 08/15/2022. HISTORY: ORDERING SYSTEM PROVIDED HISTORY: central line placement TECHNOLOGIST PROVIDED HISTORY: Reason for exam:->central line placement FINDINGS: There is new left IJ central venous catheter, extended to the lower end of superior vena cava. No evidence of pneumothorax. Lungs are clear bilaterally without any acute process. No pleural effusions. Top normal cardiac size, mediastinal contour and pulmonary vascularity. No definable abnormality in visualized bony thorax or in visualized upper abdomen. Left IJ central venous catheter extends to lower end of SVC. No pneumothorax. No acute cardiopulmonary abnormality.      XR CHEST PORTABLE    Result Date: 8/15/2022  EXAMINATION: ONE XRAY VIEW OF THE CHEST 8/15/2022 4:25 pm COMPARISON: 06/05/2022 HISTORY: ORDERING SYSTEM PROVIDED HISTORY: fatigue TECHNOLOGIST PROVIDED HISTORY: Reason for exam:->fatigue FINDINGS: The lungs are without acute focal process. There is no effusion or pneumothorax. The cardiomediastinal silhouette is without acute process. The osseous structures are without acute process. No acute process. Patient Instructions:      Medication List        CONTINUE taking these medications      BD Pen Needle Camila U/F 32G X 4 MM Misc  Generic drug: Insulin Pen Needle  Uses with insulin 4 times a day     glucose 4 g chewable tablet  Take 4 tablets by mouth as needed for Low blood sugar     insulin lispro (1 Unit Dial) 100 UNIT/ML Sopn  Commonly known as: HumaLOG KwikPen  Inject 1 units per 10 grams of carbs + following sliding scale. -200 add 2U, -250 add 4U, -300 add 6U, -350 add 8U, -400 add 12U, BS over 400 add 12U            ASK your doctor about these medications      Lantus SoloStar 100 UNIT/ML injection pen  Generic drug: insulin glargine  Inject 22 Units into the skin nightly  Ask about: Which instructions should I use?                Where to Get Your Medications        These medications were sent to [facility] [address] [place] [id number]      Phone: [phone]   Lantus SoloStar 100 UNIT/ML injection pen           Note that 35 minutes was spent in preparing discharge papers, discussing discharge with patient, medication review, etc.    Signed:  Electronically signed by Charmaine Bridges DO on 8/17/2022 at 2:58 PM

## 2022-08-18 ENCOUNTER — TELEPHONE (OUTPATIENT)
Dept: PRIMARY CARE CLINIC | Age: 27
End: 2022-08-18

## 2022-08-20 LAB — BLOOD CULTURE, ROUTINE: NORMAL

## 2022-08-21 LAB — CULTURE, BLOOD 2: NORMAL

## 2022-08-25 NOTE — PROGRESS NOTES
Faxed h/p, lab and ekg to [facility].   pen 3    insulin lispro, 1 Unit Dial, 100 UNIT/ML SOPN Inject 3 times daily before using carb ratrio of 1U:10G  Plus sliding scale max 50 units daily 4 pen 2    NOVOLOG FLEXPEN 100 UNIT/ML injection pen PER SLIDING SCALE. MAX OF 60 UNITS DAILY      insulin lispro (HUMALOG) 100 UNIT/ML injection vial Inject 0-3 Units into the skin nightly (Patient taking differently: Inject 0-3 Units into the skin 3 times daily (before meals) Sliding scale) 1 vial 3     No current facility-administered medications on file prior to visit. Review of Systems  Constitutional:Negative for activity change, appetite change, chills, fatigue and fever. Respiratory: Negative for choking, chest tightness, shortness of breath and wheezing. Cardiovascular: Negative for chest pain, palpitations and leg swelling. Gastrointestinal: Negative for abdominal distention, constipation, diarrhea, nausea and vomiting. Musculoskeletal: Negative for arthralgias, back pain, gait problem and joint swelling. Neurological: Negative for dizziness, weakness,numbness and headaches. There were no vitals taken for this visit. Physical Exam   Constitutional:  Oriented to person, place, and time. Appears well-developed and well-nourished. No acute distress. Neurological:Alert and oriented to person, place, and time. Skin: No diaphoresis. Psychiatric: Normal mood and affect. Behavior is Normal.     ASSESSMENT AND PLAN:    Linda Amos was seen today for 3 month follow-up.     Diagnoses and all orders for this visit:    Uncontrolled type 1 diabetes mellitus with hyperglycemia ([address]    Severe protein-calorie malnutrition ([address]    Gastroparesis        Carrillo Garcia presents today for routine follow-up    She states that she is doing well overall    Her gastroparesis is improving    Likewise per her blood glucose levels her diabetes is better controlled    I implored her to continue to follow-up with endocrinology and gastroenterology    She does have pending blood work ordered by her endocrinologist.    I advised her to have this blood work done at the hospital in [facility]    She assures me that she is following up with optometry as well        Return in about 3 months (around 8/4/2021) for Follow-up of her chronic medical issues. Electronically signed by Smiley Ansari DO on 5/4/2021 at 2:02 PM      TeleMedicine Patient Consent    This visit was performed as a virtual video visit using a synchronous, two-way, audio-video telehealth technology platform. Patient identification was verified at the start of the visit, including the patient's telephone number and physical location. I discussed with the patient the nature of our telehealth visits, that:     1. Due to the nature of an audio- video modality, the only components of a physical exam that could be done are the elements supported by direct observation. 2. I would evaluate the patient and recommend diagnostics and treatments based on my assessment. 3. If it was felt that the patient should be evaluated in clinic or an emergency room setting, then they would be directed there. 4. Our sessions are not being recorded and that personal health information is protected. 5. Our team would provide follow up care in person if/when the patient needs it. Patient Does agree to proceed with telemedicine consultation. Patient's location: home address in [address] home address in [place]  Other people involved in call - None      Time spent: Not billed by time    This visit was completed virtually using [employer]. me    Due to this being a TeleHealth encounter, evaluation of the following organ systems is limited: Vitals/Constitutional/EENT/Resp/CV/GI//MS/Neuro/Skin/Heme-Lymph-Imm.     Pursuant to the emergency declaration under the [address], [address] [place] [address] authority and the [employer] Act, this Virtual Visit was conducted, with patient's consent, to reduce the patient's risk of exposure to COVID-19 and provide continuity of care for an established patient. Services were provided through a video synchronous discussion virtually to substitute for in-person clinic visit. Please note that the above documentation was prepared using voice recognition software. Every attempt was made to ensure accuracy but there may be spelling, grammatical, and contextual errors.     Electronically signed by Serenity Quezada DO on 5/4/2021 at 2:02 PM

## 2022-10-02 ENCOUNTER — APPOINTMENT (OUTPATIENT)
Dept: ULTRASOUND IMAGING | Age: 27
DRG: 566 | End: 2022-10-02
Payer: COMMERCIAL

## 2022-10-02 ENCOUNTER — HOSPITAL ENCOUNTER (INPATIENT)
Age: 27
LOS: 3 days | Discharge: HOME | DRG: 566 | End: 2022-10-05
Attending: STUDENT IN AN ORGANIZED HEALTH CARE EDUCATION/TRAINING PROGRAM | Admitting: STUDENT IN AN ORGANIZED HEALTH CARE EDUCATION/TRAINING PROGRAM
Payer: COMMERCIAL

## 2022-10-02 DIAGNOSIS — E10.65 TYPE 1 DIABETES MELLITUS WITH HYPERGLYCEMIA (HCC): ICD-10-CM

## 2022-10-02 DIAGNOSIS — E10.10 DIABETIC KETOACIDOSIS WITHOUT COMA ASSOCIATED WITH TYPE 1 DIABETES MELLITUS (HCC): Primary | ICD-10-CM

## 2022-10-02 DIAGNOSIS — Z3A.01 LESS THAN 8 WEEKS GESTATION OF PREGNANCY: ICD-10-CM

## 2022-10-02 LAB
ALBUMIN SERPL-MCNC: 3.5 G/DL (ref 3.5–5.2)
ALP BLD-CCNC: 73 U/L (ref 35–104)
ALT SERPL-CCNC: 11 U/L (ref 0–32)
ANION GAP SERPL CALCULATED.3IONS-SCNC: 14 MMOL/L (ref 7–16)
ANION GAP SERPL CALCULATED.3IONS-SCNC: 19 MMOL/L (ref 7–16)
AST SERPL-CCNC: 15 U/L (ref 0–31)
BACTERIA: ABNORMAL /HPF
BASOPHILS ABSOLUTE: 0.04 E9/L (ref 0–0.2)
BASOPHILS RELATIVE PERCENT: 0.4 % (ref 0–2)
BETA-HYDROXYBUTYRATE: 3.86 MMOL/L (ref 0.02–0.27)
BILIRUB SERPL-MCNC: 0.4 MG/DL (ref 0–1.2)
BILIRUBIN URINE: NEGATIVE
BLOOD, URINE: NEGATIVE
BUN BLDV-MCNC: 14 MG/DL (ref 6–20)
BUN BLDV-MCNC: 17 MG/DL (ref 6–20)
CALCIUM SERPL-MCNC: 8.9 MG/DL (ref 8.6–10.2)
CALCIUM SERPL-MCNC: 9.7 MG/DL (ref 8.6–10.2)
CHLORIDE BLD-SCNC: 100 MMOL/L (ref 98–107)
CHLORIDE BLD-SCNC: 94 MMOL/L (ref 98–107)
CHP ED QC CHECK: NORMAL
CHP ED QC CHECK: NORMAL
CLARITY: CLEAR
CO2: 15 MMOL/L (ref 22–29)
CO2: 16 MMOL/L (ref 22–29)
COLOR: YELLOW
CREAT SERPL-MCNC: 0.5 MG/DL (ref 0.5–1)
CREAT SERPL-MCNC: 0.5 MG/DL (ref 0.5–1)
EOSINOPHILS ABSOLUTE: 0.02 E9/L (ref 0.05–0.5)
EOSINOPHILS RELATIVE PERCENT: 0.2 % (ref 0–6)
EPITHELIAL CELLS, UA: ABNORMAL /HPF
GFR AFRICAN AMERICAN: >60
GFR AFRICAN AMERICAN: >60
GFR NON-AFRICAN AMERICAN: >60 ML/MIN/1.73
GFR NON-AFRICAN AMERICAN: >60 ML/MIN/1.73
GLUCOSE BLD-MCNC: 170 MG/DL (ref 74–99)
GLUCOSE BLD-MCNC: 405 MG/DL
GLUCOSE BLD-MCNC: 476 MG/DL (ref 74–99)
GLUCOSE BLD-MCNC: 484 MG/DL
GLUCOSE URINE: >=1000 MG/DL
GONADOTROPIN, CHORIONIC (HCG) QUANT: 890.8 MIU/ML
HCG, URINE, POC: POSITIVE
HCT VFR BLD CALC: 38.8 % (ref 34–48)
HEMOGLOBIN: 12.9 G/DL (ref 11.5–15.5)
IMMATURE GRANULOCYTES #: 0.02 E9/L
IMMATURE GRANULOCYTES %: 0.2 % (ref 0–5)
KETONES, URINE: 40 MG/DL
LACTIC ACID, SEPSIS: 1.7 MMOL/L (ref 0.5–1.9)
LEUKOCYTE ESTERASE, URINE: NEGATIVE
LIPASE: 34 U/L (ref 13–60)
LYMPHOCYTES ABSOLUTE: 2.24 E9/L (ref 1.5–4)
LYMPHOCYTES RELATIVE PERCENT: 22.2 % (ref 20–42)
Lab: NORMAL
MAGNESIUM: 1.5 MG/DL (ref 1.6–2.6)
MAGNESIUM: 1.6 MG/DL (ref 1.6–2.6)
MCH RBC QN AUTO: 26.3 PG (ref 26–35)
MCHC RBC AUTO-ENTMCNC: 33.2 % (ref 32–34.5)
MCV RBC AUTO: 79.2 FL (ref 80–99.9)
METER GLUCOSE: 163 MG/DL (ref 74–99)
METER GLUCOSE: 164 MG/DL (ref 74–99)
METER GLUCOSE: 177 MG/DL (ref 74–99)
METER GLUCOSE: 278 MG/DL (ref 74–99)
METER GLUCOSE: 405 MG/DL (ref 74–99)
METER GLUCOSE: 484 MG/DL (ref 74–99)
MONOCYTES ABSOLUTE: 0.25 E9/L (ref 0.1–0.95)
MONOCYTES RELATIVE PERCENT: 2.5 % (ref 2–12)
NEGATIVE QC PASS/FAIL: NORMAL
NEUTROPHILS ABSOLUTE: 7.51 E9/L (ref 1.8–7.3)
NEUTROPHILS RELATIVE PERCENT: 74.5 % (ref 43–80)
NITRITE, URINE: NEGATIVE
PDW BLD-RTO: 14.4 FL (ref 11.5–15)
PH UA: 6 (ref 5–9)
PH VENOUS: 7.34 (ref 7.35–7.45)
PHOSPHORUS: 2.6 MG/DL (ref 2.5–4.5)
PLATELET # BLD: 294 E9/L (ref 130–450)
PMV BLD AUTO: 11.8 FL (ref 7–12)
POSITIVE QC PASS/FAIL: NORMAL
POTASSIUM SERPL-SCNC: 3.8 MMOL/L (ref 3.5–5)
POTASSIUM SERPL-SCNC: 4.3 MMOL/L (ref 3.5–5)
PROTEIN UA: NEGATIVE MG/DL
RBC # BLD: 4.9 E12/L (ref 3.5–5.5)
RBC UA: ABNORMAL /HPF (ref 0–2)
REASON FOR REJECTION: NORMAL
REJECTED TEST: NORMAL
SODIUM BLD-SCNC: 128 MMOL/L (ref 132–146)
SODIUM BLD-SCNC: 130 MMOL/L (ref 132–146)
SPECIFIC GRAVITY UA: <=1.005 (ref 1–1.03)
TOTAL PROTEIN: 6.7 G/DL (ref 6.4–8.3)
UROBILINOGEN, URINE: 0.2 E.U./DL
WBC # BLD: 10.1 E9/L (ref 4.5–11.5)
WBC UA: ABNORMAL /HPF (ref 0–5)

## 2022-10-02 PROCEDURE — 80053 COMPREHEN METABOLIC PANEL: CPT

## 2022-10-02 PROCEDURE — 87081 CULTURE SCREEN ONLY: CPT

## 2022-10-02 PROCEDURE — 82800 BLOOD PH: CPT

## 2022-10-02 PROCEDURE — 93975 VASCULAR STUDY: CPT

## 2022-10-02 PROCEDURE — 82010 KETONE BODYS QUAN: CPT

## 2022-10-02 PROCEDURE — 2000000000 HC ICU R&B

## 2022-10-02 PROCEDURE — 2580000003 HC RX 258: Performed by: STUDENT IN AN ORGANIZED HEALTH CARE EDUCATION/TRAINING PROGRAM

## 2022-10-02 PROCEDURE — 82962 GLUCOSE BLOOD TEST: CPT

## 2022-10-02 PROCEDURE — 6360000002 HC RX W HCPCS: Performed by: STUDENT IN AN ORGANIZED HEALTH CARE EDUCATION/TRAINING PROGRAM

## 2022-10-02 PROCEDURE — 85025 COMPLETE CBC W/AUTO DIFF WBC: CPT

## 2022-10-02 PROCEDURE — 81001 URINALYSIS AUTO W/SCOPE: CPT

## 2022-10-02 PROCEDURE — 99291 CRITICAL CARE FIRST HOUR: CPT

## 2022-10-02 PROCEDURE — 99223 1ST HOSP IP/OBS HIGH 75: CPT | Performed by: STUDENT IN AN ORGANIZED HEALTH CARE EDUCATION/TRAINING PROGRAM

## 2022-10-02 PROCEDURE — 84100 ASSAY OF PHOSPHORUS: CPT

## 2022-10-02 PROCEDURE — 83735 ASSAY OF MAGNESIUM: CPT

## 2022-10-02 PROCEDURE — 84702 CHORIONIC GONADOTROPIN TEST: CPT

## 2022-10-02 PROCEDURE — 36415 COLL VENOUS BLD VENIPUNCTURE: CPT

## 2022-10-02 PROCEDURE — 83690 ASSAY OF LIPASE: CPT

## 2022-10-02 PROCEDURE — 80048 BASIC METABOLIC PNL TOTAL CA: CPT

## 2022-10-02 PROCEDURE — 76817 TRANSVAGINAL US OBSTETRIC: CPT

## 2022-10-02 PROCEDURE — 86140 C-REACTIVE PROTEIN: CPT

## 2022-10-02 PROCEDURE — 84145 PROCALCITONIN (PCT): CPT

## 2022-10-02 PROCEDURE — 93005 ELECTROCARDIOGRAM TRACING: CPT | Performed by: STUDENT IN AN ORGANIZED HEALTH CARE EDUCATION/TRAINING PROGRAM

## 2022-10-02 PROCEDURE — 99285 EMERGENCY DEPT VISIT HI MDM: CPT

## 2022-10-02 PROCEDURE — 83605 ASSAY OF LACTIC ACID: CPT

## 2022-10-02 RX ORDER — SODIUM CHLORIDE AND POTASSIUM CHLORIDE .9; .15 G/100ML; G/100ML
SOLUTION INTRAVENOUS CONTINUOUS
Status: DISCONTINUED | OUTPATIENT
Start: 2022-10-02 | End: 2022-10-03

## 2022-10-02 RX ORDER — DEXTROSE AND SODIUM CHLORIDE 5; .45 G/100ML; G/100ML
INJECTION, SOLUTION INTRAVENOUS CONTINUOUS PRN
Status: DISCONTINUED | OUTPATIENT
Start: 2022-10-02 | End: 2022-10-03

## 2022-10-02 RX ORDER — 0.9 % SODIUM CHLORIDE 0.9 %
1000 INTRAVENOUS SOLUTION INTRAVENOUS ONCE
Status: COMPLETED | OUTPATIENT
Start: 2022-10-02 | End: 2022-10-02

## 2022-10-02 RX ORDER — MAGNESIUM SULFATE 1 G/100ML
1000 INJECTION INTRAVENOUS PRN
Status: DISCONTINUED | OUTPATIENT
Start: 2022-10-02 | End: 2022-10-03

## 2022-10-02 RX ORDER — POTASSIUM CHLORIDE 7.45 MG/ML
10 INJECTION INTRAVENOUS PRN
Status: DISCONTINUED | OUTPATIENT
Start: 2022-10-02 | End: 2022-10-03

## 2022-10-02 RX ORDER — ONDANSETRON 2 MG/ML
4 INJECTION INTRAMUSCULAR; INTRAVENOUS EVERY 6 HOURS PRN
Status: DISCONTINUED | OUTPATIENT
Start: 2022-10-02 | End: 2022-10-05 | Stop reason: HOSPADM

## 2022-10-02 RX ORDER — DEXTROSE MONOHYDRATE 100 MG/ML
INJECTION, SOLUTION INTRAVENOUS CONTINUOUS PRN
Status: DISCONTINUED | OUTPATIENT
Start: 2022-10-02 | End: 2022-10-05 | Stop reason: HOSPADM

## 2022-10-02 RX ADMIN — SODIUM CHLORIDE 1000 ML: 9 INJECTION, SOLUTION INTRAVENOUS at 15:49

## 2022-10-02 RX ADMIN — DEXTROSE AND SODIUM CHLORIDE: 5; 450 INJECTION, SOLUTION INTRAVENOUS at 21:11

## 2022-10-02 RX ADMIN — POTASSIUM CHLORIDE AND SODIUM CHLORIDE: 900; 150 INJECTION, SOLUTION INTRAVENOUS at 18:02

## 2022-10-02 RX ADMIN — POTASSIUM CHLORIDE 10 MEQ: 7.46 INJECTION, SOLUTION INTRAVENOUS at 22:12

## 2022-10-02 RX ADMIN — MAGNESIUM SULFATE 1000 MG: 1 INJECTION INTRAVENOUS at 23:06

## 2022-10-02 RX ADMIN — MAGNESIUM SULFATE 1000 MG: 1 INJECTION INTRAVENOUS at 22:11

## 2022-10-02 RX ADMIN — POTASSIUM CHLORIDE 10 MEQ: 7.46 INJECTION, SOLUTION INTRAVENOUS at 23:05

## 2022-10-02 ASSESSMENT — ENCOUNTER SYMPTOMS
SHORTNESS OF BREATH: 0
COUGH: 0
VOMITING: 0
DIARRHEA: 0
SORE THROAT: 0
BACK PAIN: 0
NAUSEA: 1
RHINORRHEA: 0

## 2022-10-02 NOTE — ED PROVIDER NOTES
[facility]  ED Provider Note  Department of Emergency Medicine     ED Room: [place] [address]      Written by: Mayank Gloria DO  Patient Name: Michelle Ball  Attending Provider: DO Antonieta  Admit Date: 10/2/2022 12:30 PM  MRN: [id number]    : 1995        Chief Complaint   Patient presents with    Abdominal Pain     X 1 month    Dizziness     X 1 month    Amenorrhea    Nausea    - Chief complaint    HPI   Michelle Ball is a 27 y.o. female presenting to the ED for evaluation of Abdominal Pain (X 1 month), Dizziness (X 1 month), Amenorrhea, and Nausea      Patient is a 28-year-old female with past medical history of insulin-dependent DM1, history of frequent encounters for DKA, presents the ED for evaluation of lower pelvic cramping, lightheadedness x1-2 months. Patient also states her last menstrual cycle was from -. She denies any vaginal bleeding or discharge. Does state that her urine looked cloudy, denies any dysuria. He has not had any fevers, chest pain or palpitations or shortness of breath. Has had episodic nausea, no vomiting. States her blood glucose at home has \"been up and down\"; she denies any headaches, changes in vision, falls or injuries anywhere. States she came in for evaluation today because she missed her period and the episodic cramping has not gone away. Complaints overall are moderate in severity without specific alleviating factors; somewhat worsened with palpation of her right lower abdomen/pelvic region. Patient denies any history of gynecologic procedures. Denies any known history of pregnancy. Notably, patient's urine pregnancy test is positive today; she would be considered . Review of Systems   Constitutional:  Negative for chills and fever. HENT:  Negative for rhinorrhea and sore throat. Eyes:  Negative for visual disturbance. Respiratory:  Negative for cough and shortness of breath.     Cardiovascular: Negative for chest pain and palpitations. Gastrointestinal:  Positive for nausea. Negative for diarrhea and vomiting. Genitourinary:  Positive for pelvic pain (mild cramping, right sided episodic x1 month). Negative for dysuria, flank pain, frequency, vaginal bleeding and vaginal discharge. Musculoskeletal:  Negative for back pain and myalgias. Skin:  Negative for rash and wound. Neurological:  Positive for light-headedness. Negative for weakness and headaches. Psychiatric/Behavioral:  Negative for confusion. All other systems reviewed and are negative. Physical Exam  Vitals and nursing note reviewed. Constitutional:       General: She is not in acute distress. Appearance: She is not toxic-appearing. HENT:      Head: Normocephalic and atraumatic. Right Ear: External ear normal.      Left Ear: External ear normal.      Nose: Nose normal. No rhinorrhea. Mouth/Throat:      Mouth: Mucous membranes are moist.      Pharynx: Oropharynx is clear. Eyes:      Extraocular Movements: Extraocular movements intact. Conjunctiva/sclera: Conjunctivae normal.      Pupils: Pupils are equal, round, and reactive to light. Cardiovascular:      Rate and Rhythm: Normal rate and regular rhythm. Pulses: Normal pulses. Heart sounds: Normal heart sounds. Pulmonary:      Effort: Pulmonary effort is normal. No respiratory distress. Breath sounds: Normal breath sounds. No wheezing or rales. Abdominal:      General: Bowel sounds are normal.      Palpations: Abdomen is soft. Tenderness: There is abdominal tenderness (trace). There is no right CVA tenderness, left CVA tenderness, guarding or rebound. Musculoskeletal:         General: Normal range of motion. Cervical back: Normal range of motion and neck supple. Right lower leg: No edema. Left lower leg: No edema. Skin:     General: Skin is warm and dry.       Capillary Refill: Capillary refill takes less than 2 seconds. Neurological:      General: No focal deficit present. Mental Status: She is alert and oriented to person, place, and time. Sensory: No sensory deficit. Psychiatric:         Mood and Affect: Mood normal.         Behavior: Behavior normal.        Procedures       MDM     Amount and/or Complexity of Data Reviewed  Decide to obtain previous medical records or to obtain history from someone other than the patient: yes         ED Course as of 10/03/22 0039   Sun Oct 02, 2022   6922 Patient is a 61-year-old female with past medical history of insulin-dependent DM1, history of frequent encounters for DKA, presents the ED for evaluation of lower pelvic cramping, lightheadedness x1-2 months. Patient also states her last menstrual cycle was from -. She denies any vaginal bleeding or discharge. Does state that her urine looked cloudy, denies any dysuria. He has not had any fevers, chest pain or palpitations or shortness of breath. Has had episodic nausea, no vomiting. States her blood glucose at home has \"been up and down\"; she denies any headaches, changes in vision, falls or injuries anywhere. States she came in for evaluation today because she missed her period and the episodic cramping has not gone away. Complaints overall are moderate in severity without specific alleviating factors; somewhat worsened with palpation of her right lower abdomen/pelvic region. Patient denies any history of gynecologic procedures. Denies any known history of pregnancy. Notably, patient's urine pregnancy test is positive today; she would be considered . [VG]   1426 EKG: This EKG is signed and interpreted by me. Rate: 112  Rhythm: Sinus  Interpretation: sinus tachycardia, no ST elevations ST depressions, , QRS 64, QTc 434  Comparison: No signal change compared to prior of 08/15/2022 [BB]   1510    IMPRESSION:  1.   Tiny round fluid collection in the body/fundal endometrial region which  may represent an early gestational sac. The mean sac diameter would be 0.21  cm. Neither a fetal pole or yolk sac seen at this time. Clinical age  provided is 5 weeks and 2 days. 2.  Endometrium is heterogeneous and thickened measuring 18 mm. Possible  corpus luteum cyst on the left. Otherwise normal appearance of the ovaries. There are no adnexal masses. Normal ovarian vascularity. 3.  Small amount of simple appearing free fluid in the pelvis. Recommendation: Suggest trending quantitative beta HCG levels with repeat  pelvic ultrasound in 4-5 weeks. Imaging should occur sooner for worsening  symptoms. [VG]   1510 Normal flow of bilateral ovaries. No adnexal mass noted on right or left. Possible small corpus luteal cyst on left. [VG]   [id number] Spoke with Dr. Florentino Delgadillo, discussed the Patient, she will accept to the ICU [BB]   [id number] Spoke with Dr. Jacquelyn Ramirez, discussed case, patient is accepted for admission.  [VG]      ED Course User Index  [BB] Tian Bryant DO  [VG] Saw Willett DO         Medical Decision Making: This is a 27 y.o. female presenting for evaluation of episodic lower pelvic cramping moreso on right, on and off for over 1 month; intermittent nausea, lightheadedness; LMP 8/30. Hx frequent encounters for DKA in the past.  On arrival patient is alert, oriented, non-toxic in appearance. Vitals are notable for sinus tachycardia, otherwise stable. Labs and imaging ordered. Patient seen and examined. Exam is notable for trace right pelvic discomfort on palpation; no masses felt, no rebound or guarding. Lungs CTA b/l. Labs reviewed, ur preg positive; beta hcg 890. US shows tiny round fluid collection in the body/fundal endometrial region which could represent an early gestational sac; there is no fetal pole or yolk sac seen, clinical age would be 5 weeks and 2 days.   There is a small corpus luteal cyst on the left; there are no adnexal masses and there is normal bilateral blood flow of both ovaries. Labs also significant for AG 19, bicarb 15, beta hydroxy 3.86, venous pH 7.34. Patient treated with IV fluids and DKA protocol initiated; she will be admitted to the ICU. She was updated on results; she is amenable to this plan. Medicine and intensivist; patient is accepted for admission to the ICU. See ED COURSE for additional MDM. EKG reviewed and interpreted by me: This EKG is signed by emergency department physician. Sinus tachycardia, normal axis, no ST elevations; no significant changes compared to previous EKG. Vent. rate 112 BPM   OK interval 146 ms   QRS duration 64 ms   QT/QTc 318/434 ms      Labs & imaging were reviewed and interpreted, see RESULTS. I have personally reviewed all laboratory and imaging results for this patient. I have discussed this patient with my attending, who has seen the patient and agrees with this disposition. Patient was seen and evaluated by myself and my attending Maame Meyer DO. Assessment and Plan discussed with attending provider, please see attestation for final plan of care.           --------------------------------------------- PAST HISTORY ---------------------------------------------  Past Medical History:  has a past medical history of Bleeding at insertion site, Common femoral artery injury, right, initial encounter, Depressive disorder, Diabetic ketoacidosis ([facility] [address] DM type 1 (diabetes mellitus, type 1) ([facility] [address] Marijuana abuse, Non-compliance, and Trichimoniasis. Past Surgical History:  has a past surgical history that includes Abdomen surgery (N/A, 5/9/2019) and Bartholin gland cyst excision. Social History:  reports that she has never smoked. She has never used smokeless tobacco. She reports that she does not currently use drugs after having used the following drugs: Marijuana [place]). She reports that she does not drink alcohol.     Family History: family history includes Diabetes in her maternal grandmother and paternal grandmother; Stroke in an other family member. Unless otherwise noted, family history is non contributory. The patients home medications have been reviewed. Allergies: Patient has no known allergies.     -------------------------------------------------- RESULTS -------------------------------------------------    LABS:  Results for orders placed or performed during the hospital encounter of 10/02/22   CBC with Auto Differential   Result Value Ref Range    WBC 10.1 4.5 - 11.5 E9/L    RBC 4.90 3.50 - 5.50 E12/L    Hemoglobin 12.9 11.5 - 15.5 g/dL    Hematocrit 38.8 34.0 - 48.0 %    MCV 79.2 (L) 80.0 - 99.9 fL    MCH 26.3 26.0 - 35.0 pg    MCHC 33.2 32.0 - 34.5 %    RDW 14.4 11.5 - 15.0 fL    Platelets 821 987 - 797 E9/L    MPV 11.8 7.0 - 12.0 fL    Neutrophils % 74.5 43.0 - 80.0 %    Immature Granulocytes % 0.2 0.0 - 5.0 %    Lymphocytes % 22.2 20.0 - 42.0 %    Monocytes % 2.5 2.0 - 12.0 %    Eosinophils % 0.2 0.0 - 6.0 %    Basophils % 0.4 0.0 - 2.0 %    Neutrophils Absolute 7.51 (H) 1.80 - 7.30 E9/L    Immature Granulocytes # 0.02 E9/L    Lymphocytes Absolute 2.24 1.50 - 4.00 E9/L    Monocytes Absolute 0.25 0.10 - 0.95 E9/L    Eosinophils Absolute 0.02 (L) 0.05 - 0.50 E9/L    Basophils Absolute 0.04 0.00 - 0.20 E9/L   Lipase   Result Value Ref Range    Lipase 34 13 - 60 U/L   Urinalysis with Microscopic   Result Value Ref Range    Color, UA Yellow Straw/Yellow    Clarity, UA Clear Clear    Glucose, Ur >=1000 (A) Negative mg/dL    Bilirubin Urine Negative Negative    Ketones, Urine 40 (A) Negative mg/dL    Specific Gravity, UA <=1.005 1.005 - 1.030    Blood, Urine Negative Negative    pH, UA 6.0 5.0 - 9.0    Protein, UA Negative Negative mg/dL    Urobilinogen, Urine 0.2 <2.0 E.U./dL    Nitrite, Urine Negative Negative    Leukocyte Esterase, Urine Negative Negative    WBC, UA 2-5 0 - 5 /HPF    RBC, UA 1-3 0 - 2 /HPF    Epithelial Cells, UA MODERATE /HPF    Bacteria, UA FEW (A) None Seen /HPF   Lactate, Sepsis   Result Value Ref Range    Lactic Acid, Sepsis 1.7 0.5 - 1.9 mmol/L   Beta-Hydroxybutyrate   Result Value Ref Range    Beta-Hydroxybutyrate 3.86 (H) 0.02 - 0.27 mmol/L   PH, VENOUS   Result Value Ref Range    pH, Christoph 7.34 (L) 7.35 - 7.45   hCG, quantitative, pregnancy   Result Value Ref Range    hCG Quant 890.8 (H) <10 mIU/mL   SPECIMEN REJECTION   Result Value Ref Range    Rejected Test cmpx     Reason for Rejection see below    Comprehensive Metabolic Panel   Result Value Ref Range    Sodium 128 (L) 132 - 146 mmol/L    Potassium 4.3 3.5 - 5.0 mmol/L    Chloride 94 (L) 98 - 107 mmol/L    CO2 15 (L) 22 - 29 mmol/L    Anion Gap 19 (H) 7 - 16 mmol/L    Glucose 476 (H) 74 - 99 mg/dL    BUN 17 6 - 20 mg/dL    Creatinine 0.5 0.5 - 1.0 mg/dL    GFR Non-African American >60 >=60 mL/min/1.73    GFR African American >60     Calcium 9.7 8.6 - 10.2 mg/dL    Total Protein 6.7 6.4 - 8.3 g/dL    Albumin 3.5 3.5 - 5.2 g/dL    Total Bilirubin 0.4 0.0 - 1.2 mg/dL    Alkaline Phosphatase 73 35 - 104 U/L    ALT 11 0 - 32 U/L    AST 15 0 - 31 U/L   Magnesium   Result Value Ref Range    Magnesium 1.6 1.6 - 2.6 mg/dL   Basic Metabolic Panel   Result Value Ref Range    Sodium 130 (L) 132 - 146 mmol/L    Potassium 3.8 3.5 - 5.0 mmol/L    Chloride 100 98 - 107 mmol/L    CO2 16 (L) 22 - 29 mmol/L    Anion Gap 14 7 - 16 mmol/L    Glucose 170 (H) 74 - 99 mg/dL    BUN 14 6 - 20 mg/dL    Creatinine 0.5 0.5 - 1.0 mg/dL    GFR Non-African American >60 >=60 mL/min/1.73    GFR African American >60     Calcium 8.9 8.6 - 10.2 mg/dL   Magnesium   Result Value Ref Range    Magnesium 1.5 (L) 1.6 - 2.6 mg/dL   Phosphorus   Result Value Ref Range    Phosphorus 2.6 2.5 - 4.5 mg/dL   POCT Glucose   Result Value Ref Range    Glucose 484 mg/dL    QC OK? ok    POC Pregnancy Urine   Result Value Ref Range    HCG, Urine, POC Positive Negative    Lot Number [id number]     Positive QC Pass/Fail Pass     Negative QC Pass/Fail Pass    POCT Glucose   Result Value Ref Range    Meter Glucose 484 (H) 74 - 99 mg/dL   POCT Glucose - every hour   Result Value Ref Range    Glucose 405 mg/dL    QC OK? ok    POCT Glucose   Result Value Ref Range    Meter Glucose 405 (H) 74 - 99 mg/dL   POCT Glucose   Result Value Ref Range    Meter Glucose 278 (H) 74 - 99 mg/dL   POCT Glucose   Result Value Ref Range    Meter Glucose 177 (H) 74 - 99 mg/dL   POCT Glucose   Result Value Ref Range    Meter Glucose 163 (H) 74 - 99 mg/dL   POCT Glucose   Result Value Ref Range    Meter Glucose 164 (H) 74 - 99 mg/dL   EKG 12 Lead   Result Value Ref Range    Ventricular Rate 112 BPM    Atrial Rate 112 BPM    P-R Interval 146 ms    QRS Duration 64 ms    Q-T Interval 318 ms    QTc Calculation (Bazett) 434 ms    P Axis 83 degrees    R Axis 78 degrees    T Axis 67 degrees       RADIOLOGY:  US DUP ABD PEL RETRO SCROT COMPLETE   Final Result   1. Tiny round fluid collection in the body/fundal endometrial region which   may represent an early gestational sac. The mean sac diameter would be 0.21   cm. Neither a fetal pole or yolk sac seen at this time. Clinical age   provided is 5 weeks and 2 days. 2.  Endometrium is heterogeneous and thickened measuring 18 mm. Possible   corpus luteum cyst on the left. Otherwise normal appearance of the ovaries. There are no adnexal masses. Normal ovarian vascularity. 3.  Small amount of simple appearing free fluid in the pelvis. Recommendation: Suggest trending quantitative beta HCG levels with repeat   pelvic ultrasound in 4-5 weeks. Imaging should occur sooner for worsening   symptoms. US OB TRANSVAGINAL   Final Result   1. Tiny round fluid collection in the body/fundal endometrial region which   may represent an early gestational sac. The mean sac diameter would be 0.21   cm. Neither a fetal pole or yolk sac seen at this time. Clinical age   provided is 5 weeks and 2 days.       2.  Endometrium is heterogeneous and thickened measuring 18 mm. Possible   corpus luteum cyst on the left. Otherwise normal appearance of the ovaries. There are no adnexal masses. Normal ovarian vascularity. 3.  Small amount of simple appearing free fluid in the pelvis. Recommendation: Suggest trending quantitative beta HCG levels with repeat   pelvic ultrasound in 4-5 weeks. Imaging should occur sooner for worsening   symptoms. Interpreted by the radiologist unless otherwise specified.      ------------------------- NURSING NOTES AND VITALS REVIEWED ---------------------------  Date / Time Roomed:  10/2/2022 12:30 PM  ED Bed Assignment:  [id number]    The nursing notes within the ED encounter and vital signs as below have been reviewed by myself. Patient Vitals for the past 24 hrs:   BP Temp Temp src Pulse Resp SpO2 Height Weight   10/02/22 2200 (!) 156/98 -- -- (!) 108 17 -- -- --   10/02/22 2100 (!) 147/108 -- -- (!) 109 12 -- -- --   10/02/22 1945 (!) 142/94 98.4 °F (36.9 °C) Oral (!) 114 16 98 % 5' 1\" (1.549 m) 104 lb 15 oz (47.6 kg)   10/02/22 1818 -- -- -- -- -- -- 5' 1\" (1.549 m) 110 lb (49.9 kg)   10/02/22 1140 (!) 123/103 98.8 °F (37.1 °C) Oral (!) 115 18 100 % -- 110 lb (49.9 kg)       Oxygen Saturation Interpretation: Normal    The patients available past medical records and past encounters were reviewed. ------------------------------------------ PROGRESS NOTES ------------------------------------------  Re-evaluation(s):  Please see ED course    Counseling:  I have spoken with the patient and discussed todays results, in addition to providing specific details for the plan of care and counseling regarding the diagnosis and prognosis.   Their questions are answered at this time and they are agreeable with the plan of admission.    --------------------------------- ADDITIONAL PROVIDER NOTES ---------------------------------  Consultations:  Please see ED course    This patient's ED course included: a personal history and physicial examination, re-evaluation prior to disposition, multiple bedside re-evaluations, IV medications, cardiac monitoring, continuous pulse oximetry, and complex medical decision making and emergency management    This patient has remained hemodynamically stable during their ED course. Diagnosis:  1. Diabetic ketoacidosis without coma associated with type 1 diabetes mellitus ([facility] [address]    2. Less than 8 weeks gestation of pregnancy        Disposition:  Patient's disposition: Admit to CCU/ICU  Patient's condition is critical.           Louie Salazar D.O. PGY-3     Resident Physician     Emergency Medicine      10/2/2022 1:59 PM      NOTE: This report was transcribed using voice recognition software.  Every effort was made to ensure accuracy; however, inadvertent computerized transcription errors may be present             Louie Salazar DO  Resident  10/03/22 4760

## 2022-10-02 NOTE — ED NOTES
Emily Whiting from [facility] called and stated they do not have the staff at this time to get the patient due to patient conditions on their floor. She asked if we could transport the patient otherwise it will be a while before they can come. I advised her I will see what I can do as we are in the same situation.  Charge nurse updated     Korin Sanchez RN  10/02/22 3510

## 2022-10-02 NOTE — H&P
[facility] History and Physical      CHIEF COMPLAINT:  stomach pain    History of Present Illness:    Patient presented to ED with stabbing stomach pain which is typical for her when she's in DKA. Stomach pain for several weeks, threw up two weeks ago, feels bloated after meals. Stomach hurts today 8/10, migrates from whole abdomen to back. Sometimes crampy. Stool really dark recently, drinking lots of pepto bismol because of stomach pain. Denies blood in stool or sudden change in consistency. Pee real cloudy, first noticed last week. Denies pain or burning with urination or blood in her urine. Reports polydipsia and polyphagia for past several days. Denies blood in stool. Frequently at hospital for DKA, last discharged 8/17/22. Patient reports she had cold last week which she thinks started all this. Reports blurry vision, dizziness, lightheadedness, some nausea today. Sugars at home as low as 68 and as high as 350. Not on insulin pump. Follows with Dr. Celeste Tracey endocrinology. Taking insulin lispro and glargine at home since last DKA, last took long acting 22 units last night. No family with her currently, came via private car to ED.     Home meds:   Insulin glargine  Insulin lispro    Informant(s) for H&P: patient, medical record    REVIEW OF SYSTEMS:  A comprehensive review of systems was negative except for: what is in the HPI      PMH:  Past Medical History:   Diagnosis Date    Bleeding at insertion site 01/05/2018    Common femoral artery injury, right, initial encounter 01/05/2018    Depressive disorder     Diabetic ketoacidosis ([facility] [address]     DM type 1 (diabetes mellitus, type 1) ([facility] [address]     Marijuana abuse     Non-compliance     Trichimoniasis 11/19/2021       Surgical History:  Past Surgical History:   Procedure Laterality Date    ABDOMEN SURGERY N/A 5/9/2019    INCISION AND DRAINAGE OF SUPRAPUBIC ABSESS performed by Christian Rasheed MD at [address] [place] [address] yr       Medications Prior to Admission:    Prior to Admission medications    Medication Sig Start Date End Date Taking? Authorizing Provider   insulin glargine (LANTUS SOLOSTAR) 100 UNIT/ML injection pen Inject 22 Units into the skin nightly 8/17/22   Margarita Carbone MD   glucose 4g chewable tablet Take 4 tablets by mouth as needed for Low blood sugar 6/7/22   Jill Urrutia, DO   Insulin Pen Needle (BD PEN NEEDLE SHELBIE U/F) 32G X 4 MM MISC Uses with insulin 4 times a day 5/2/22   Loi Graves, DO   insulin lispro, 1 Unit Dial, (HUMALOG KWIKPEN) 100 UNIT/ML SOPN Inject 1 units per 10 grams of carbs + following sliding scale. -200 add 2U, -250 add 4U, -300 add 6U, -350 add 8U, -400 add 12U, BS over 400 add 12U 4/26/22   Margarita Carbone MD       Allergies:    Patient has no known allergies. Social History:    reports that she has never smoked. She has never used smokeless tobacco. She reports that she does not currently use drugs after having used the following drugs: Marijuana [employer]ashing). She reports that she does not drink alcohol. Family History:   family history includes Diabetes in her maternal grandmother and paternal grandmother; Stroke in an other family member.        PHYSICAL EXAM:  Vitals:  BP (!) 123/103   Pulse (!) 115   Temp 98.8 °F (37.1 °C) (Oral)   Resp 18   Wt 110 lb (49.9 kg)   SpO2 100%   BMI 20.78 kg/m²     General Appearance: alert and oriented to person, place and time and in no acute distress  Skin: warm and dry  Head: normocephalic and atraumatic  Eyes: pupils equal, round, and reactive to light, extraocular eye movements intact, conjunctivae normal  Neck: neck supple and non tender without mass   Pulmonary/Chest: clear to auscultation bilaterally- no wheezes, rales or rhonchi, normal air movement, no respiratory distress  Cardiovascular: normal rate, normal S1 and S2 and no carotid bruits  Abdomen: soft, upper quadrants tender to palpation, non-distended, normal bowel sounds, no masses or organomegaly  Extremities: no cyanosis, no clubbing and no edema  Neurologic: no cranial nerve deficit and speech normal        LABS:  Recent Labs     10/02/22  1311 10/02/22  1526   NA  --  128*   K  --  4.3   CL  --  94*   CO2  --  15*   BUN  --  17   CREATININE  --  0.5   GLUCOSE 484 476*   CALCIUM  --  9.7       Recent Labs     10/02/22  1307   WBC 10.1   RBC 4.90   HGB 12.9   HCT 38.8   MCV 79.2*   MCH 26.3   MCHC 33.2   RDW 14.4      MPV 11.8       No results for input(s): POCGLU in the last 72 hours. Glucose 476, ketones and glucose in urine, positive urine pregnancy, us 5 week 2 day fetus, anion gap 19, beta hydroxybuterate 3.86, serum pH 7.34. Radiology:   US DUP ABD PEL RETRO SCROT COMPLETE   Final Result   1. Tiny round fluid collection in the body/fundal endometrial region which   may represent an early gestational sac. The mean sac diameter would be 0.21   cm. Neither a fetal pole or yolk sac seen at this time. Clinical age   provided is 5 weeks and 2 days. 2.  Endometrium is heterogeneous and thickened measuring 18 mm. Possible   corpus luteum cyst on the left. Otherwise normal appearance of the ovaries. There are no adnexal masses. Normal ovarian vascularity. 3.  Small amount of simple appearing free fluid in the pelvis. Recommendation: Suggest trending quantitative beta HCG levels with repeat   pelvic ultrasound in 4-5 weeks. Imaging should occur sooner for worsening   symptoms. US OB TRANSVAGINAL   Final Result   1. Tiny round fluid collection in the body/fundal endometrial region which   may represent an early gestational sac. The mean sac diameter would be 0.21   cm. Neither a fetal pole or yolk sac seen at this time. Clinical age   provided is 5 weeks and 2 days. 2.  Endometrium is heterogeneous and thickened measuring 18 mm. Possible   corpus luteum cyst on the left.   Otherwise normal appearance of the ovaries. There are no adnexal masses. Normal ovarian vascularity. 3.  Small amount of simple appearing free fluid in the pelvis. Recommendation: Suggest trending quantitative beta HCG levels with repeat   pelvic ultrasound in 4-5 weeks. Imaging should occur sooner for worsening   symptoms. EKG: sinus tachycardia    ASSESSMENT:      Principal Problem:    DKA, type 1, not at goal [facility])  Resolved Problems:    * No resolved hospital problems. *      PLAN:    DKA  -IVF NS w/20 mEeq K+ infusion 250 cc/hr w/parameters to switch to D5/1/2NS if glucose <250  -Repeat BMP Q4H  -5 unit bolus regular insulin  -insulin regular infusion  -replenish mag and phosphate as needed  -NPO   -admit to critical care unit  -consulted endocrinology  -consulted diabetes educator  -check hgb A1c, hepatic function, lactate, magnesium, phosphorus, POCT Q1H    Pregnancy  -Zofran 4 mg IV Q6H for nausea        Code Status: Full  Diet NPO      Lesley Ward DO  Family Medicine Resident, PGY-1  Noe  10/2/2022 4:55 PM     NOTE: This report was transcribed using voice recognition software. Every effort was made to ensure accuracy; however, inadvertent computerized transcription errors may be present.

## 2022-10-02 NOTE — CONSULTS
Critical Care Admit/Consult Note         Patient - Jaime Marin   MRN -  [id number]   [place] # - [de-identified]   - 1995      Date of Admission -  10/2/2022 12:30 PM  Date of evaluation -  10/2/2022  0208/0208-A   Hospital Day - 0            ADMIT/CONSULT DETAILS     Reason for Admit/Consult   DKA    Consulting Alex Jackson MD  Primary Care Physician - Nickolas Muir, DO FELIZ   The patient is a 27 y.o. female with significant past medical history of depression, noncompliant type I DKA. Patient presents to ED today for complaints of abdominal pain nausea and vomiting. States this has been ongoing for a few months. She also complains of some migraines as well as blurry vision yesterday which has resolved. Having stomach cramps as well as dark stools but has been consuming a lot of Pepto-Bismol. Also complains of a amenorrhea. Multiple admissions for DKA last discharge on 2022. States she has been doing somewhat better with managing her blood sugars. However they have been up over the last few days. Does not adhere to any specific diet. ED labs significant for BGL 44, AG 19, CO2 15, BHB 3.86, vVpH 7.34 and positive for ketones, hCG quant 890.8. She was given 1 L of fluid in ED and initiated on insulin drip. Patient arrived in ICU for further management currently has no complaints at this time, all symptoms have resolved is requesting a meal as soon as she is out of DKA.          Past Medical History         Diagnosis Date    Bleeding at insertion site 2018    Common femoral artery injury, right, initial encounter 2018    Depressive disorder     Diabetic ketoacidosis ([facility] [address]     DM type 1 (diabetes mellitus, type 1) ([facility] [address]     Marijuana abuse     Non-compliance     Trichimoniasis 2021        Past Surgical History           Procedure Laterality Date    ABDOMEN SURGERY N/A 2019    INCISION AND DRAINAGE OF SUPRAPUBIC ABSESS performed by Abdiaziz Jean Baptiste MD at [address] [facility]      last yr             Current Medications   Current Medications    insulin regular  0.1 Units/kg IntraVENous Once     dextrose bolus **OR** dextrose bolus, potassium chloride, magnesium sulfate, sodium phosphate IVPB **OR** sodium phosphate IVPB **OR** sodium phosphate IVPB, dextrose 5 % and 0.45 % NaCl, ondansetron  IV Drips/Infusions   0.9% NaCl with KCl 20 mEq 250 mL/hr at 10/02/22 1802    dextrose 5 % and 0.45 % NaCl      insulin 0.1 Units/kg/hr (10/02/22 1808)     Home Medications  Medications Prior to Admission: insulin glargine (LANTUS SOLOSTAR) 100 UNIT/ML injection pen, Inject 22 Units into the skin nightly  glucose 4g chewable tablet, Take 4 tablets by mouth as needed for Low blood sugar  Insulin Pen Needle (BD PEN NEEDLE SHELBIE U/F) 32G X 4 MM MISC, Uses with insulin 4 times a day  insulin lispro, 1 Unit Dial, (HUMALOG KWIKPEN) 100 UNIT/ML SOPN, Inject 1 units per 10 grams of carbs + following sliding scale. -200 add 2U, -250 add 4U, -300 add 6U, -350 add 8U, -400 add 12U, BS over 400 add 12U    Diet/Nutrition   Diet NPO    Allergies   Patient has no known allergies. Social History   Tobacco   reports that she has never smoked. She has never used smokeless tobacco.    Alcohol     reports no history of alcohol use.     Occupational history :    Family History         Problem Relation Age of Onset    Diabetes Maternal Grandmother     Diabetes Paternal Grandmother     Stroke Other     Thyroid Disease Neg Hx        Sleep History   n/a    ROS     REVIEW OF SYSTEMS:  CONSTITUTIONAL:  negative  EYES:  negative  HEENT:  negative  RESPIRATORY:  negative  CARDIOVASCULAR:  negative  GASTROINTESTINAL:  negative  GENITOURINARY:  negative  INTEGUMENT/BREAST:  negative  HEMATOLOGIC/LYMPHATIC:  negative  ALLERGIC/IMMUNOLOGIC:  negative  ENDOCRINE:  negative  MUSCULOSKELETAL:  negative  NEUROLOGICAL: negative  BEHAVIOR/PSYCH:  negative    Lines and Devices    N/A    Mechanical Ventilation Data   VENT SETTINGS (Comprehensive)     Additional Respiratory Assessments  Heart Rate: (!) 115  Resp: 18  SpO2: 100 %    ABG  Lab Results   Component Value Date/Time    PH 7.282 11/10/2020 10:38 AM    PCO2 47.5 11/10/2020 10:38 AM    PO2 20.8 11/10/2020 10:38 AM    HCO3 21.9 11/10/2020 10:38 AM    O2SAT 26.5 11/10/2020 10:38 AM     Lab Results   Component Value Date/Time    MODE RA 11/10/2020 10:38 AM           Vitals    height is 5' 1\" (1.549 m) and weight is 110 lb (49.9 kg). Her oral temperature is 98.8 °F (37.1 °C). Her blood pressure is 123/103 (abnormal) and her pulse is 115 (abnormal). Her respiration is 18 and oxygen saturation is 100%. Temperature Range: Temp: 98.8 °F (37.1 °C) Temp  Av.8 °F (37.1 °C)  Min: 98.8 °F (37.1 °C)  Max: 98.8 °F (37.1 °C)  BP Range:  Systolic (42WGJ), ANNIA:368 , Min:123 , GMU:549     Diastolic (11IGF), RXI:391, Min:103, Max:103    Pulse Range: Pulse  Av  Min: 115  Max: 115  Respiration Range: Resp  Av  Min: 18  Max: 18  Current Pulse Ox[de-identified]  SpO2: 100 %  24HR Pulse Ox Range:  SpO2  Av %  Min: 100 %  Max: 100 %  Oxygen Amount and Delivery:        I/O (24 Hours)    Patient Vitals for the past 8 hrs:   BP Temp Temp src Pulse Resp SpO2 Height Weight   10/02/22 1818 -- -- -- -- -- -- 5' 1\" (1.549 m) 110 lb (49.9 kg)   10/02/22 1140 (!) 123/103 98.8 °F (37.1 °C) Oral (!) 115 18 100 % -- 110 lb (49.9 kg)     No intake or output data in the 24 hours ending 10/02/22 1923  No intake/output data recorded.      Patient Vitals for the past 96 hrs (Last 3 readings):   Weight   10/02/22 1818 110 lb (49.9 kg)   10/02/22 1140 110 lb (49.9 kg)         Drains/Tubes Outputs  N/A    Exam         PHYSICAL EXAM:  CONSTITUTIONAL:    awake, alert, cooperative, no apparent distress, and appears stated age  EYES:    Lids and lashes normal, pupils equal, round and reactive to light, extra ocular muscles intact, sclera clear, conjunctiva normal  ENT:    Normocephalic, without obvious abnormality, atraumatic, sinuses nontender on palpation, external ears without lesions, oral pharynx with moist mucus membranes, tonsils without erythema or exudates, gums normal and good dentition. NECK:    Supple, symmetrical, trachea midline, no adenopathy, thyroid symmetric, not enlarged and no tenderness, skin normal  LUNGS:    No increased work of breathing, good air exchange, clear to auscultation bilaterally, no crackles or wheezing  CARDIOVASCULAR:    Normal apical impulse, regular rate and rhythm, normal S1 and S2, no S3 or S4, and no murmur noted  ABDOMEN:    No scars, normal bowel sounds, soft, non-distended, non-tender, no masses palpated, no hepatosplenomegally  MUSCULOSKELETAL:    There is no redness, warmth, or swelling of the joints. Full range of motion noted. Motor strength is 5 out of 5 all extremities bilaterally. Tone is normal.  NEUROLOGIC:    Awake, alert, oriented to name, place and time. Cranial nerves II-XII are grossly intact. Motor is 5 out of 5 bilaterally. Cerebellar finger to nose, heel to shin intact. Sensory is intact.   Babinski down going, Romberg negative, and gait is normal.  SKIN:    no bruising or bleeding    Data   Old records and images have been reviewed    Lab Results   CBC     Lab Results   Component Value Date/Time    WBC 10.1 10/02/2022 01:07 PM    RBC 4.90 10/02/2022 01:07 PM    HGB 12.9 10/02/2022 01:07 PM    HCT 38.8 10/02/2022 01:07 PM     10/02/2022 01:07 PM    MCV 79.2 10/02/2022 01:07 PM    MCH 26.3 10/02/2022 01:07 PM    MCHC 33.2 10/02/2022 01:07 PM    RDW 14.4 10/02/2022 01:07 PM    NRBC 0.0 05/10/2019 09:30 AM    SEGSPCT 58 09/29/2013 03:30 PM    METASPCT 1.0 04/29/2020 04:06 PM    LYMPHOPCT 22.2 10/02/2022 01:07 PM    MONOPCT 2.5 10/02/2022 01:07 PM    MYELOPCT 1.0 04/29/2020 04:06 PM    BASOPCT 0.4 10/02/2022 01:07 PM    MONOSABS 0.25 10/02/2022 01:07 PM    LYMPHSABS 2.24 10/02/2022 01:07 PM    EOSABS 0.02 10/02/2022 01:07 PM    BASOSABS 0.04 10/02/2022 01:07 PM       BMP   Lab Results   Component Value Date/Time     10/02/2022 03:26 PM    K 4.3 10/02/2022 03:26 PM    K 7.4 08/15/2022 04:18 PM    CL 94 10/02/2022 03:26 PM    CO2 15 10/02/2022 03:26 PM    BUN 17 10/02/2022 03:26 PM    CREATININE 0.5 10/02/2022 03:26 PM    GLUCOSE 405 10/02/2022 06:08 PM    CALCIUM 9.7 10/02/2022 03:26 PM       LFTS  Lab Results   Component Value Date/Time    ALKPHOS 73 10/02/2022 03:26 PM    ALT 11 10/02/2022 03:26 PM    AST 15 10/02/2022 03:26 PM    PROT 6.7 10/02/2022 03:26 PM    BILITOT 0.4 10/02/2022 03:26 PM    BILIDIR <0.2 06/24/2022 05:45 PM    IBILI see below 06/24/2022 05:45 PM    LABALBU 3.5 10/02/2022 03:26 PM       INR  No results for input(s): PROTIME, INR in the last 72 hours. APTT  No results for input(s): APTT in the last 72 hours. Lactic Acid  Lab Results   Component Value Date/Time    LACTA 1.4 08/16/2022 12:40 AM    LACTA 2.2 06/05/2022 03:32 PM    LACTA 1.8 04/24/2022 04:03 PM        BNP   No results for input(s): BNP in the last 72 hours. Cultures     No results for input(s): BC in the last 72 hours. No results for input(s): Barnetta Renetta in the last 72 hours. No results for input(s): LABURIN in the last 72 hours. Radiology   CXR  Deferred due to pregnancy    CT Scans  Deferred due to pregnancy    SYSTEMS ASSESSMENT    Neuro   # No active problems    Respiratory   # No active problems    Cardiovascular   # Tachycardic  - 2/2 deyhdration  - EKG sinus tachycardia    Gastrointestinal   # Nausea and vomiting  - Has been ongoing for about a month. Some cramping as well  - 2/2 pregnancy-induced, worsened by DKA    Renal   # Pseudohyponatremia  - Sodium corrected for hyperglycemia 137      Infectious Disease   # No active problems  - No clinical indication of infection.   No white count, UA clean, LA 1.7, check Pro-Charanjit and CRP.    Hematology/Oncology   # No active problems    Endocrine   # DKA  - 2/2 nausea and vomiting induced by pregnancy  - Continue insulin drip and fluids per DKA protocol, q4h Bmp, mag & phos, replacements per protocol.   - Consult Endocrinology: appreciate recommendations     Reproduction   # New diagnosis of pregnancy   - 5 weeks 2 days, LMP Aug 26, reports this is her first pregnancy   - Encouraged to follow outpatient with OBGYN     Social/Spiritual/DNR/Other   CODE STATUS: Full code  DVT prophylaxis: SCDs  PPI: Not indicated      \"Thank you for asking us to see this complex patient. \"     Case discussed with Dr. Stiven Mir  Electronically signed by MIKE Hui - CNP on 10/2/2022 at 8:14 PM    Total critical care time caring for this patient with life threatening, unstable organ failure, including direct patient contact, management of life support systems, review of data including imaging and labs, discussions with other team members and physicians at least 28  Min so far today, excluding procedures. Please feel free to call with questions or concerns    NOTE: This report was transcribed using voice recognition software. Every effort was made to ensure accuracy; however, inadvertent computerized transcription errors may be present. I personally saw, examined and provided care for the patient. Radiographs, labs and medication list were reviewed by me independently. I spoke with bedside nursing, therapists and consultants. Critical care services and times documented are independent of procedures and multidisciplinary rounds with CNP. Additionally comprehensive, multidisciplinary rounds were conducted with the MICU team. The case was discussed in detail and plans for care were established. Review of CNP documentation was conducted and revisions were made as appropriate. I agree with the above documented exam, problem list and plan of care.    Darrell Johns MD

## 2022-10-02 NOTE — PROGRESS NOTES
Patient admitted from ER to 208, with the following belongings purse, necklace, phone, , slides, placed on monitor, patient oriented to room and unit visiting hours. Patient guide at bedside, reviewed patient rights and responsibilities. MRSA nasal swab obtained. Bed alarm on. Call light within reach. Maple Phalen, RN

## 2022-10-03 ENCOUNTER — TELEPHONE (OUTPATIENT)
Dept: ENDOCRINOLOGY | Age: 27
End: 2022-10-03

## 2022-10-03 LAB
ANION GAP SERPL CALCULATED.3IONS-SCNC: 11 MMOL/L (ref 7–16)
ANION GAP SERPL CALCULATED.3IONS-SCNC: 9 MMOL/L (ref 7–16)
BASOPHILS ABSOLUTE: 0.04 E9/L (ref 0–0.2)
BASOPHILS RELATIVE PERCENT: 0.4 % (ref 0–2)
BUN BLDV-MCNC: 11 MG/DL (ref 6–20)
BUN BLDV-MCNC: 9 MG/DL (ref 6–20)
C-REACTIVE PROTEIN: 0.3 MG/DL (ref 0–0.4)
CALCIUM SERPL-MCNC: 8.7 MG/DL (ref 8.6–10.2)
CALCIUM SERPL-MCNC: 8.8 MG/DL (ref 8.6–10.2)
CHLORIDE BLD-SCNC: 101 MMOL/L (ref 98–107)
CHLORIDE BLD-SCNC: 102 MMOL/L (ref 98–107)
CO2: 16 MMOL/L (ref 22–29)
CO2: 18 MMOL/L (ref 22–29)
CREAT SERPL-MCNC: 0.4 MG/DL (ref 0.5–1)
CREAT SERPL-MCNC: 0.5 MG/DL (ref 0.5–1)
EKG ATRIAL RATE: 112 BPM
EKG P AXIS: 83 DEGREES
EKG P-R INTERVAL: 146 MS
EKG Q-T INTERVAL: 318 MS
EKG QRS DURATION: 64 MS
EKG QTC CALCULATION (BAZETT): 434 MS
EKG R AXIS: 78 DEGREES
EKG T AXIS: 67 DEGREES
EKG VENTRICULAR RATE: 112 BPM
EOSINOPHILS ABSOLUTE: 0.07 E9/L (ref 0.05–0.5)
EOSINOPHILS RELATIVE PERCENT: 0.8 % (ref 0–6)
GFR AFRICAN AMERICAN: >60
GFR AFRICAN AMERICAN: >60
GFR NON-AFRICAN AMERICAN: >60 ML/MIN/1.73
GFR NON-AFRICAN AMERICAN: >60 ML/MIN/1.73
GLUCOSE BLD-MCNC: 136 MG/DL (ref 74–99)
GLUCOSE BLD-MCNC: 283 MG/DL (ref 74–99)
HBA1C MFR BLD: 13.3 % (ref 4–5.6)
HCT VFR BLD CALC: 32.7 % (ref 34–48)
HEMOGLOBIN: 11.1 G/DL (ref 11.5–15.5)
IMMATURE GRANULOCYTES #: 0.02 E9/L
IMMATURE GRANULOCYTES %: 0.2 % (ref 0–5)
LYMPHOCYTES ABSOLUTE: 3.08 E9/L (ref 1.5–4)
LYMPHOCYTES RELATIVE PERCENT: 33.8 % (ref 20–42)
MAGNESIUM: 2 MG/DL (ref 1.6–2.6)
MAGNESIUM: 2.3 MG/DL (ref 1.6–2.6)
MCH RBC QN AUTO: 26.9 PG (ref 26–35)
MCHC RBC AUTO-ENTMCNC: 33.9 % (ref 32–34.5)
MCV RBC AUTO: 79.4 FL (ref 80–99.9)
METER GLUCOSE: 132 MG/DL (ref 74–99)
METER GLUCOSE: 139 MG/DL (ref 74–99)
METER GLUCOSE: 167 MG/DL (ref 74–99)
METER GLUCOSE: 222 MG/DL (ref 74–99)
METER GLUCOSE: 224 MG/DL (ref 74–99)
METER GLUCOSE: 234 MG/DL (ref 74–99)
METER GLUCOSE: 235 MG/DL (ref 74–99)
METER GLUCOSE: 268 MG/DL (ref 74–99)
METER GLUCOSE: 279 MG/DL (ref 74–99)
METER GLUCOSE: 387 MG/DL (ref 74–99)
MONOCYTES ABSOLUTE: 0.5 E9/L (ref 0.1–0.95)
MONOCYTES RELATIVE PERCENT: 5.5 % (ref 2–12)
NEUTROPHILS ABSOLUTE: 5.41 E9/L (ref 1.8–7.3)
NEUTROPHILS RELATIVE PERCENT: 59.3 % (ref 43–80)
PDW BLD-RTO: 14.3 FL (ref 11.5–15)
PHOSPHORUS: 2.5 MG/DL (ref 2.5–4.5)
PHOSPHORUS: 3.1 MG/DL (ref 2.5–4.5)
PLATELET # BLD: 296 E9/L (ref 130–450)
PMV BLD AUTO: 11.8 FL (ref 7–12)
POTASSIUM SERPL-SCNC: 4 MMOL/L (ref 3.5–5)
POTASSIUM SERPL-SCNC: 4.6 MMOL/L (ref 3.5–5)
PROCALCITONIN: 0.06 NG/ML (ref 0–0.08)
RBC # BLD: 4.12 E12/L (ref 3.5–5.5)
SODIUM BLD-SCNC: 128 MMOL/L (ref 132–146)
SODIUM BLD-SCNC: 129 MMOL/L (ref 132–146)
WBC # BLD: 9.1 E9/L (ref 4.5–11.5)

## 2022-10-03 PROCEDURE — 6370000000 HC RX 637 (ALT 250 FOR IP): Performed by: INTERNAL MEDICINE

## 2022-10-03 PROCEDURE — 83036 HEMOGLOBIN GLYCOSYLATED A1C: CPT

## 2022-10-03 PROCEDURE — 83735 ASSAY OF MAGNESIUM: CPT

## 2022-10-03 PROCEDURE — 82962 GLUCOSE BLOOD TEST: CPT

## 2022-10-03 PROCEDURE — 1200000000 HC SEMI PRIVATE

## 2022-10-03 PROCEDURE — 85025 COMPLETE CBC W/AUTO DIFF WBC: CPT

## 2022-10-03 PROCEDURE — 84100 ASSAY OF PHOSPHORUS: CPT

## 2022-10-03 PROCEDURE — 80048 BASIC METABOLIC PNL TOTAL CA: CPT

## 2022-10-03 PROCEDURE — 36415 COLL VENOUS BLD VENIPUNCTURE: CPT

## 2022-10-03 PROCEDURE — 99232 SBSQ HOSP IP/OBS MODERATE 35: CPT | Performed by: STUDENT IN AN ORGANIZED HEALTH CARE EDUCATION/TRAINING PROGRAM

## 2022-10-03 PROCEDURE — 6370000000 HC RX 637 (ALT 250 FOR IP)

## 2022-10-03 PROCEDURE — 6360000002 HC RX W HCPCS: Performed by: STUDENT IN AN ORGANIZED HEALTH CARE EDUCATION/TRAINING PROGRAM

## 2022-10-03 PROCEDURE — 6360000002 HC RX W HCPCS: Performed by: INTERNAL MEDICINE

## 2022-10-03 RX ORDER — INSULIN GLARGINE 100 [IU]/ML
22 INJECTION, SOLUTION SUBCUTANEOUS ONCE
Status: COMPLETED | OUTPATIENT
Start: 2022-10-03 | End: 2022-10-03

## 2022-10-03 RX ORDER — INSULIN LISPRO 100 [IU]/ML
0-4 INJECTION, SOLUTION INTRAVENOUS; SUBCUTANEOUS NIGHTLY
Status: DISCONTINUED | OUTPATIENT
Start: 2022-10-03 | End: 2022-10-04

## 2022-10-03 RX ORDER — ACETAMINOPHEN 325 MG/1
650 TABLET ORAL EVERY 6 HOURS PRN
Status: DISCONTINUED | OUTPATIENT
Start: 2022-10-03 | End: 2022-10-05 | Stop reason: HOSPADM

## 2022-10-03 RX ORDER — INSULIN LISPRO 100 [IU]/ML
4 INJECTION, SOLUTION INTRAVENOUS; SUBCUTANEOUS
Status: DISCONTINUED | OUTPATIENT
Start: 2022-10-03 | End: 2022-10-04

## 2022-10-03 RX ORDER — PRENATAL WITH FERROUS FUM AND FOLIC ACID 3080; 920; 120; 400; 22; 1.84; 3; 20; 10; 1; 12; 200; 27; 25; 2 [IU]/1; [IU]/1; MG/1; [IU]/1; MG/1; MG/1; MG/1; MG/1; MG/1; MG/1; UG/1; MG/1; MG/1; MG/1; MG/1
1 TABLET ORAL DAILY
Status: DISCONTINUED | OUTPATIENT
Start: 2022-10-03 | End: 2022-10-05 | Stop reason: HOSPADM

## 2022-10-03 RX ORDER — INSULIN GLARGINE 100 [IU]/ML
22 INJECTION, SOLUTION SUBCUTANEOUS DAILY
Status: DISCONTINUED | OUTPATIENT
Start: 2022-10-04 | End: 2022-10-04

## 2022-10-03 RX ORDER — POTASSIUM CHLORIDE 20 MEQ/1
40 TABLET, EXTENDED RELEASE ORAL ONCE
Status: COMPLETED | OUTPATIENT
Start: 2022-10-03 | End: 2022-10-03

## 2022-10-03 RX ORDER — INSULIN LISPRO 100 [IU]/ML
0-8 INJECTION, SOLUTION INTRAVENOUS; SUBCUTANEOUS
Status: DISCONTINUED | OUTPATIENT
Start: 2022-10-03 | End: 2022-10-04

## 2022-10-03 RX ADMIN — ONDANSETRON 4 MG: 2 INJECTION INTRAMUSCULAR; INTRAVENOUS at 13:04

## 2022-10-03 RX ADMIN — INSULIN LISPRO 4 UNITS: 100 INJECTION, SOLUTION INTRAVENOUS; SUBCUTANEOUS at 17:21

## 2022-10-03 RX ADMIN — POTASSIUM CHLORIDE 40 MEQ: 1500 TABLET, EXTENDED RELEASE ORAL at 00:43

## 2022-10-03 RX ADMIN — POTASSIUM CHLORIDE 10 MEQ: 7.46 INJECTION, SOLUTION INTRAVENOUS at 00:05

## 2022-10-03 RX ADMIN — INSULIN LISPRO 8 UNITS: 100 INJECTION, SOLUTION INTRAVENOUS; SUBCUTANEOUS at 16:12

## 2022-10-03 RX ADMIN — INSULIN GLARGINE 22 UNITS: 100 INJECTION, SOLUTION SUBCUTANEOUS at 07:56

## 2022-10-03 RX ADMIN — METFORMIN HYDROCHLORIDE 1 TABLET: 500 TABLET, EXTENDED RELEASE ORAL at 11:57

## 2022-10-03 RX ADMIN — ACETAMINOPHEN 650 MG: 325 TABLET ORAL at 20:29

## 2022-10-03 RX ADMIN — INSULIN LISPRO 4 UNITS: 100 INJECTION, SOLUTION INTRAVENOUS; SUBCUTANEOUS at 08:00

## 2022-10-03 RX ADMIN — ACETAMINOPHEN 650 MG: 325 TABLET ORAL at 11:57

## 2022-10-03 ASSESSMENT — PAIN SCALES - GENERAL: PAINLEVEL_OUTOF10: 7

## 2022-10-03 ASSESSMENT — PAIN - FUNCTIONAL ASSESSMENT: PAIN_FUNCTIONAL_ASSESSMENT: ACTIVITIES ARE NOT PREVENTED

## 2022-10-03 ASSESSMENT — PAIN DESCRIPTION - LOCATION: LOCATION: HEAD

## 2022-10-03 ASSESSMENT — PAIN DESCRIPTION - ORIENTATION: ORIENTATION: RIGHT;LEFT

## 2022-10-03 ASSESSMENT — PAIN DESCRIPTION - DESCRIPTORS: DESCRIPTORS: ACHING;DISCOMFORT

## 2022-10-03 NOTE — PROGRESS NOTES
Critical Care Team - Daily Progress Note         Date and time: 10/3/2022 11:50 AM  Patient's name:  Lui Chung  Medical Record Number: [id number]  Patient's account/billing number: [de-identified]  Patient's YOB: 1995  Age: 27 y. o. Date of Admission: 10/2/2022 12:30 PM  Length of stay during current admission: 1      Primary Care Physician: Lorenzo Bird DO  ICU Attending Physician: Dr. Onelia Walters Status: Full Code    Reason for ICU admission: DKA      SUBJECTIVE:     OVERNIGHT EVENTS:       Anion gap closed overnight x2. Patient was bridged. This morning feeling much better. Tolerating oral intake.       CURRENT VENTILATION STATUS:     [] Ventilator  [] BIPAP  [x] Nasal Cannula [] Room Air      IF INTUBATED, ET TUBE MARKING AT LOWER LIP:       cms    SECRETIONS Amount:  [] Small [] Moderate  [] Large  [] None  Color:     [] White [] Colored  [] Bloody    SEDATION:  RAAS Score:  [] Propofol gtt  [] Versed gtt  [] Ativan gtt   [x] No Sedation    PARALYZED:  [x] No    [] Yes      VASOPRESSORS:  [x] No    [] Yes    If yes -   [] Levophed       [] Dopamine     [] Vasopressin       [] Dobutamine  [] Phenylephrine         [] Epinephrine    CENTRAL LINES:     [x] No   [] Yes   (Date of Insertion:   )           If yes -     [] Right IJ     [] Left IJ [] Right Femoral [] Left Femoral                   [] Right Subclavian [] Left Subclavian       CROFT'S CATHETER:   [x] No   [] Yes  (Date of Insertion:   )     URINE OUTPUT:            [x] Good   [] Low              [] Anuric      OBJECTIVE:     VITAL SIGNS:  BP (!) 143/94   Pulse (!) 108   Temp 98.4 °F (36.9 °C) (Oral)   Resp 16   Ht 5' 1\" (1.549 m)   Wt 104 lb 15 oz (47.6 kg)   SpO2 98%   BMI 19.83 kg/m²   Tmax over 24 hours:  Temp (24hrs), Av.3 °F (36.8 °C), Min:98.2 °F (36.8 °C), Max:98.4 °F (36.9 °C)      Patient Vitals for the past 6 hrs:   BP Temp Temp src Pulse Resp SpO2   10/03/22 0800 (!) 143/94 98.4 °F (36.9 °C) Oral (!) 108 16 98 %   10/03/22 0600 (!) 116/93 -- -- 98 14 --         Intake/Output Summary (Last 24 hours) at 10/3/2022 1150  Last data filed at 10/3/2022 0830  Gross per 24 hour   Intake 2552.96 ml   Output --   Net 2552.96 ml     Wt Readings from Last 2 Encounters:   10/02/22 104 lb 15 oz (47.6 kg)   08/17/22 113 lb 3.2 oz (51.3 kg)     Body mass index is 19.83 kg/m². PHYSICAL EXAMINATION:    GENERAL: Alert and oriented x3 in no acute distress. HEENT: Atraumatic. Normocephalic. Extraocular muscles are intact. Pupils are equal, round and reactive to light and accommodation. Sclera is nonicteric. NECK: Supple. No JVD. No adenopathy. No bruits. CARDIOVASCULAR: Regular rate and rhythm. No murmur, gallop or thrills. LUNGS: Clear to auscultation bilaterally. No wheezing or rhonchi. ABDOMEN: Soft, nontender. No distention or organomegaly. EXTREMITIES: No clubbing, no cellulitis. Positive peripheral pulses, equal bilaterally. Any additional physical findings:    MEDICATIONS:    Scheduled Meds:   insulin lispro  0-8 Units SubCUTAneous TID WC    insulin lispro  0-4 Units SubCUTAneous Nightly    [START ON 10/4/2022] insulin glargine  22 Units SubCUTAneous Daily    prenatal vitamin  1 tablet Oral Daily     Continuous Infusions:   dextrose       PRN Meds:   acetaminophen, 650 mg, Q6H PRN  ondansetron, 4 mg, Q6H PRN  glucose, 4 tablet, PRN  dextrose bolus, 125 mL, PRN   Or  dextrose bolus, 250 mL, PRN  glucagon (rDNA), 1 mg, PRN  dextrose, , Continuous PRN          VENT SETTINGS (Comprehensive) (if applicable): Additional Respiratory Assessments  Heart Rate: (!) 108  Resp: 16  SpO2: 98 %    ABGs:   No results for input(s): PH, PCO2, PO2, HCO3, BE, O2SAT in the last 72 hours.     Laboratory findings:    Complete Blood Count:   Recent Labs     10/02/22  1307 10/03/22  0516   WBC 10.1 9.1   HGB 12.9 11.1*   HCT 38.8 32.7*    296        Last 3 Blood Glucose:   Recent Labs     10/02/22  2100 10/03/22  0045 10/03/22  0516   GLUCOSE 170* 136* 283*        PT/INR:    Lab Results   Component Value Date/Time    PROTIME 10.7 12/14/2020 03:50 PM    INR 0.9 12/14/2020 03:50 PM     PTT:    Lab Results   Component Value Date/Time    APTT 35.3 02/10/2019 12:13 AM       Comprehensive Metabolic Profile:   Recent Labs     10/02/22  1526 10/02/22  1808 10/02/22  2100 10/03/22  0045 10/03/22  0516   *  --  130* 128* 129*   K 4.3  --  3.8 4.0 4.6   CL 94*  --  100 101 102   CO2 15*  --  16* 18* 16*   BUN 17  --  14 11 9   CREATININE 0.5  --  0.5 0.5 0.4*   GLUCOSE 476*   < > 170* 136* 283*   CALCIUM 9.7  --  8.9 8.7 8.8   PROT 6.7  --   --   --   --    LABALBU 3.5  --   --   --   --    BILITOT 0.4  --   --   --   --    ALKPHOS 73  --   --   --   --    AST 15  --   --   --   --    ALT 11  --   --   --   --     < > = values in this interval not displayed. Magnesium:   Lab Results   Component Value Date/Time    MG 2.0 10/03/2022 05:16 AM     Phosphorus:   Lab Results   Component Value Date/Time    PHOS 3.1 10/03/2022 05:16 AM     Ionized Calcium: No results found for: CAION     Urinalysis:     Troponin: No results for input(s): TROPONINI in the last 72 hours.     Microbiology:    Cultures during this admission:     Blood cultures:                 [] None drawn      [] Negative             []  Positive (Details:  )  Urine Culture:                   [] None drawn      [] Negative             []  Positive (Details:  )  Sputum Culture:               [] None drawn       [] Negative             []  Positive (Details:  )   Endotracheal aspirate:     [] None drawn       [] Negative             []  Positive (Details:  )     Other pertinent Labs:       Radiology/Imaging:     Chest Xray (10/3/2022):        ASSESSMENT:         Additional assessment:    DKA  New diagnosis of pregnancy pregnancy  Hyponatremia most likely secondary to hyperglycemia              PLAN:     WEAN PER PROTOCOL:  [x] No   [] Yes  [] N/A    DISCONTINUE ANY LABS:   [x] No   [] Yes    ICU PROPHYLAXIS:  Stress ulcer:  [] PPI Agent  [] [id number] [] Sucralfate  [] Other:  VTE:   [] Enoxaparin  [] Unfract. Heparin Subcut  [x] EPC Cuffs    NUTRITION:  [] NPO [] Tube Feeding (Specify: ) [] TPN  [] PO (Diet: ADULT DIET; Regular; 4 carb choices (60 gm/meal))    HOME MEDICATIONS RECONCILED: [] No  [] Yes    INSULIN DRIP:   [] No   [] Yes    CONSULTATION NEEDED:  [] No   [] Yes    FAMILY UPDATED:    [] No   [] Yes    TRANSFER OUT OF ICU:   [] No   [] Yes    ADDITIONAL PLAN:  Now that patient's anion gap is closed pressure her to Lantus and sliding scale insulin. Dr. Marlyn Anderson has been consulted. Patient has been started on prenatal vitamins had a long discussion with her about the importance of seeing high risk mom and baby given her brittle diabetes and her long history of multiple hospital admissions for DKA. Patient is stable to be transferred to stepdown floor. Rehan Singh MD, MPERI. Kwasi PARDO                     10/3/2022, 11:50 AM      NOTE: This report, in part or full, may have been transcribed using voice recognition software. Every effort was made to ensure accuracy; however, inadvertent computerized transcription errors may be present. Please excuse any transcriptional grammatical or spelling errors that may have escaped my editorial review. I personally saw, examined and provided care for the patient. Radiographs, labs and medication list were reviewed by me independently. I spoke with bedside nursing, therapists and consultants. Critical care services and times documented are independent of procedures and multidisciplinary rounds with CNP. Additionally comprehensive, multidisciplinary rounds were conducted with the MICU team. The case was discussed in detail and plans for care were established. Review of CNP documentation was conducted and revisions were made as appropriate. I agree with the above documented exam, problem list and plan of care. Saintclair Allan, MD   CCT excluding procedures :35'

## 2022-10-03 NOTE — CARE COORDINATION
Pt admit ICU for DKA. Initially on insulin drip, weaned. Lives with grandmother, has all diabetic supplies. PCP is Dr Kasie Sumner. Follows with Dr Andrea Abdi. Plan is home with no needs. Will follow-mjo    The Plan for Transition of Care is related to the following treatment goals: home     The Patient and/or patient representative pt was provided with a choice of provider and agrees   with the discharge plan. [x] Yes [] No    Freedom of choice list was provided with basic dialogue that supports the patient's individualized plan of care/goals, treatment preferences and shares the quality data associated with the providers.  [x] Yes [] No

## 2022-10-03 NOTE — PROGRESS NOTES
[facility] Progress Note    Admitting Date and Time: 10/2/2022 12:30 PM  Admit Dx: DKA, type 1, not at goal [facility]) [E10.10]    Subjective:  Patient is being followed for DKA, type 1, not at goal [facility]) [E10.10]   Pt feels better, tolerating diet. Per RN: transitioned from insulin gtt to SQ insulin. ROS: denies fever, chills, cp, sob, n/v, HA unless stated above.       insulin lispro  0-8 Units SubCUTAneous TID WC    insulin lispro  0-4 Units SubCUTAneous Nightly    [START ON 10/4/2022] insulin glargine  22 Units SubCUTAneous Daily    prenatal vitamin  1 tablet Oral Daily     acetaminophen, 650 mg, Q6H PRN  ondansetron, 4 mg, Q6H PRN  glucose, 4 tablet, PRN  dextrose bolus, 125 mL, PRN   Or  dextrose bolus, 250 mL, PRN  glucagon (rDNA), 1 mg, PRN  dextrose, , Continuous PRN         Objective:    BP (!) 143/94   Pulse (!) 111   Temp 98.2 °F (36.8 °C) (Oral)   Resp 20   Ht 5' 1\" (1.549 m)   Wt 104 lb 15 oz (47.6 kg)   SpO2 98%   BMI 19.83 kg/m²     General Appearance: alert and oriented to person, place and time and in no acute distress  Skin: warm and dry  Head: normocephalic and atraumatic  Eyes: pupils equal, round, and reactive to light, extraocular eye movements intact, conjunctivae normal  Neck: neck supple and non tender without mass   Pulmonary/Chest: clear to auscultation bilaterally- no wheezes, rales or rhonchi, normal air movement, no respiratory distress  Cardiovascular: normal rate, tachycardiac, normal S1 and S2 and no carotid bruits  Abdomen: soft, non-tender, non-distended, normal bowel sounds, no masses or organomegaly  Extremities: no cyanosis, no clubbing and no edema  Neurologic: no cranial nerve deficit and speech normal        Recent Labs     10/02/22  2100 10/03/22  0045 10/03/22  0516   * 128* 129*   K 3.8 4.0 4.6    101 102   CO2 16* 18* 16*   BUN 14 11 9   CREATININE 0.5 0.5 0.4*   GLUCOSE 170* 136* 283*   CALCIUM 8.9 8.7 8.8       Recent Labs 10/02/22  1307 10/03/22  0516   WBC 10.1 9.1   RBC 4.90 4.12   HGB 12.9 11.1*   HCT 38.8 32.7*   MCV 79.2* 79.4*   MCH 26.3 26.9   MCHC 33.2 33.9   RDW 14.4 14.3    296   MPV 11.8 11.8       Micro:  No components found for: [facility])    Radiology:   US OB TRANSVAGINAL    Result Date: 10/2/2022  EXAMINATION: FIRST TRIMESTER OBSTETRIC ULTRASOUND; DOPPLER EVALUATION OF THE PELVIS 10/2/2022 TECHNIQUE: Transvaginal first trimester obstetric pelvic ultrasound was performed with color Doppler flow evaluation.; Duplex ultrasound using B-mode/gray scaled imaging and Doppler spectral analysis and color flow was obtained of the pelvis. COMPARISON: Ultrasound pelvis from June 5, 2022 HISTORY: ORDERING SYSTEM PROVIDED HISTORY: right lwoer pelvic cramping; +ur preg today, LMP 8/30 TECHNOLOGIST PROVIDED HISTORY: Reason for exam:->right lwoer pelvic cramping; +ur preg today, LMP 8/30 What reading provider will be dictating this exam?->CRC FINDINGS: Quantitative beta HCG level was 891 mIU/mL Uterus: 8.0 cm. Endometrium is heterogeneous and thickened measuring 18 mm. No uterine masses. The cervix appears to be closed. Gestational Sac(s):  Tiny round fluid collection near the body/fundal endometrium. If this does represent a tiny gestational sac the mean sac diameter is 0.21 cm. No fetal pole or yolk sac seen at this time. Right ovary: 3.4 x 1.9 x 2.3 cm. Normal appearance of the right ovary. Normal color flow and arterial/venous spectral waveforms. No right adnexal mass. Left ovary: 3.9 x 1.8 x 2.9 cm. Normal appearance of the left ovary. Possible early corpus luteum cyst on the left. Normal color flow and arterial/venous spectral waveforms. No left adnexal mass. Free fluid: Simple appearing free fluid identified in the pelvis. Measurements: Clinical age provided is 5 weeks and 2 days Gestational age by ultrasound if the small round collection represents an early gestational sac is 4 weeks and 5 days.      1.  Tiny round fluid collection in the body/fundal endometrial region which may represent an early gestational sac. The mean sac diameter would be 0.21 cm. Neither a fetal pole or yolk sac seen at this time. Clinical age provided is 5 weeks and 2 days. 2.  Endometrium is heterogeneous and thickened measuring 18 mm. Possible corpus luteum cyst on the left. Otherwise normal appearance of the ovaries. There are no adnexal masses. Normal ovarian vascularity. 3.  Small amount of simple appearing free fluid in the pelvis. Recommendation: Suggest trending quantitative beta HCG levels with repeat pelvic ultrasound in 4-5 weeks. Imaging should occur sooner for worsening symptoms. US DUP ABD PEL RETRO SCROT COMPLETE    Result Date: 10/2/2022  EXAMINATION: FIRST TRIMESTER OBSTETRIC ULTRASOUND; DOPPLER EVALUATION OF THE PELVIS 10/2/2022 TECHNIQUE: Transvaginal first trimester obstetric pelvic ultrasound was performed with color Doppler flow evaluation.; Duplex ultrasound using B-mode/gray scaled imaging and Doppler spectral analysis and color flow was obtained of the pelvis. COMPARISON: Ultrasound pelvis from June 5, 2022 HISTORY: ORDERING SYSTEM PROVIDED HISTORY: right lwoer pelvic cramping; +ur preg today, LMP 8/30 TECHNOLOGIST PROVIDED HISTORY: Reason for exam:->right lwoer pelvic cramping; +ur preg today, LMP 8/30 What reading provider will be dictating this exam?->CRC FINDINGS: Quantitative beta HCG level was 891 mIU/mL Uterus: 8.0 cm. Endometrium is heterogeneous and thickened measuring 18 mm. No uterine masses. The cervix appears to be closed. Gestational Sac(s):  Tiny round fluid collection near the body/fundal endometrium. If this does represent a tiny gestational sac the mean sac diameter is 0.21 cm. No fetal pole or yolk sac seen at this time. Right ovary: 3.4 x 1.9 x 2.3 cm. Normal appearance of the right ovary. Normal color flow and arterial/venous spectral waveforms. No right adnexal mass.  Left ovary: 3.9 x 1.8 x 2.9 cm. Normal appearance of the left ovary. Possible early corpus luteum cyst on the left. Normal color flow and arterial/venous spectral waveforms. No left adnexal mass. Free fluid: Simple appearing free fluid identified in the pelvis. Measurements: Clinical age provided is 5 weeks and 2 days Gestational age by ultrasound if the small round collection represents an early gestational sac is 4 weeks and 5 days. 1.  Tiny round fluid collection in the body/fundal endometrial region which may represent an early gestational sac. The mean sac diameter would be 0.21 cm. Neither a fetal pole or yolk sac seen at this time. Clinical age provided is 5 weeks and 2 days. 2.  Endometrium is heterogeneous and thickened measuring 18 mm. Possible corpus luteum cyst on the left. Otherwise normal appearance of the ovaries. There are no adnexal masses. Normal ovarian vascularity. 3.  Small amount of simple appearing free fluid in the pelvis. Recommendation: Suggest trending quantitative beta HCG levels with repeat pelvic ultrasound in 4-5 weeks. Imaging should occur sooner for worsening symptoms. Assessment:    Active Problems:    * No active hospital problems. *  Resolved Problems:    * No resolved hospital problems. *      Plan:  DKA - reports compliance, was vomiting on admission, s/p management in ICU per DKA protocol now on diet and SQ insulin, endo consulted, home insulin regimen. Ua normal, chest xray not obtained. Monitor electrolytes, titrate insulin and fluids per bmp. NPO  New diagnosis of pregnancy -  5 weeks 2 days, LMP Aug 26th. Reports this is her first pregnancy. Marijuana abuse - advised to stop during pg. DVT Prophylaxis - SCDs    Stepdown to intermediate/ Med surg care 10/3. NOTE: This report was transcribed using voice recognition software. Every effort was made to ensure accuracy; however, inadvertent computerized transcription errors may be present.   Electronically signed by Vijaya Guevara Sundar Clemons MD on 10/3/2022 at 12:34 PM

## 2022-10-03 NOTE — PATIENT CARE CONFERENCE
Intensive Care Daily Quality Rounding Checklist      ICU Team Members: Dr. Rock Tinajero, Dr. Jarad Sherman (resident), charge nurse, bedside nurse, clinical pharmacist, respiratory therapist     ICU Day #: NUMBER: 2    Intubation Date: N/A    Ventilator Day #: N/A    Central Line Insertion Date:  N/A        Day #: N/A     Arterial Line Insertion Date:  N/A      Day #: N/A    Temporary Hemodialysis Catheter Insertion Date:  N/A      Day # N/A    DVT Prophylaxis: none    GI Prophylaxis: none    Bey Catheter Insertion Date:  N/A       Day #: N/A      Continued need (if yes, reason documented and discussed with physician): N/A    Skin Issues/ Wounds and ordered treatment discussed on rounds: no issues    Goals/ Plans for the Day: Daily labs, already transitioned to SQ Insulin, transfer to floor

## 2022-10-04 DIAGNOSIS — E10.65 TYPE 1 DIABETES MELLITUS WITH HYPERGLYCEMIA (HCC): Primary | ICD-10-CM

## 2022-10-04 LAB
ANION GAP SERPL CALCULATED.3IONS-SCNC: 13 MMOL/L (ref 7–16)
BASOPHILS ABSOLUTE: 0.02 E9/L (ref 0–0.2)
BASOPHILS RELATIVE PERCENT: 0.3 % (ref 0–2)
BUN BLDV-MCNC: 20 MG/DL (ref 6–20)
CALCIUM SERPL-MCNC: 9 MG/DL (ref 8.6–10.2)
CHLORIDE BLD-SCNC: 97 MMOL/L (ref 98–107)
CO2: 18 MMOL/L (ref 22–29)
CORTISOL TOTAL: 20.36 MCG/DL (ref 2.68–18.4)
CREAT SERPL-MCNC: 0.6 MG/DL (ref 0.5–1)
EOSINOPHILS ABSOLUTE: 0.03 E9/L (ref 0.05–0.5)
EOSINOPHILS RELATIVE PERCENT: 0.4 % (ref 0–6)
GFR AFRICAN AMERICAN: >60
GFR NON-AFRICAN AMERICAN: >60 ML/MIN/1.73
GLUCOSE BLD-MCNC: 498 MG/DL (ref 74–99)
GLUCOSE BLD-MCNC: 560 MG/DL (ref 74–99)
HCT VFR BLD CALC: 33.8 % (ref 34–48)
HEMOGLOBIN: 11.5 G/DL (ref 11.5–15.5)
IMMATURE GRANULOCYTES #: 0.02 E9/L
IMMATURE GRANULOCYTES %: 0.3 % (ref 0–5)
LYMPHOCYTES ABSOLUTE: 2.38 E9/L (ref 1.5–4)
LYMPHOCYTES RELATIVE PERCENT: 32.7 % (ref 20–42)
MAGNESIUM: 2 MG/DL (ref 1.6–2.6)
MCH RBC QN AUTO: 26.8 PG (ref 26–35)
MCHC RBC AUTO-ENTMCNC: 34 % (ref 32–34.5)
MCV RBC AUTO: 78.8 FL (ref 80–99.9)
METER GLUCOSE: 210 MG/DL (ref 74–99)
METER GLUCOSE: 282 MG/DL (ref 74–99)
METER GLUCOSE: 282 MG/DL (ref 74–99)
METER GLUCOSE: 92 MG/DL (ref 74–99)
METER GLUCOSE: >500 MG/DL (ref 74–99)
MONOCYTES ABSOLUTE: 0.36 E9/L (ref 0.1–0.95)
MONOCYTES RELATIVE PERCENT: 4.9 % (ref 2–12)
MRSA CULTURE ONLY: NORMAL
NEUTROPHILS ABSOLUTE: 4.47 E9/L (ref 1.8–7.3)
NEUTROPHILS RELATIVE PERCENT: 61.4 % (ref 43–80)
PDW BLD-RTO: 14.6 FL (ref 11.5–15)
PHOSPHORUS: 3.8 MG/DL (ref 2.5–4.5)
PLATELET # BLD: 304 E9/L (ref 130–450)
PMV BLD AUTO: 11.2 FL (ref 7–12)
POTASSIUM SERPL-SCNC: 4.5 MMOL/L (ref 3.5–5)
RBC # BLD: 4.29 E12/L (ref 3.5–5.5)
SODIUM BLD-SCNC: 128 MMOL/L (ref 132–146)
WBC # BLD: 7.3 E9/L (ref 4.5–11.5)

## 2022-10-04 PROCEDURE — 80048 BASIC METABOLIC PNL TOTAL CA: CPT

## 2022-10-04 PROCEDURE — 6370000000 HC RX 637 (ALT 250 FOR IP): Performed by: INTERNAL MEDICINE

## 2022-10-04 PROCEDURE — 1200000000 HC SEMI PRIVATE

## 2022-10-04 PROCEDURE — 99232 SBSQ HOSP IP/OBS MODERATE 35: CPT | Performed by: INTERNAL MEDICINE

## 2022-10-04 PROCEDURE — 36415 COLL VENOUS BLD VENIPUNCTURE: CPT

## 2022-10-04 PROCEDURE — 82962 GLUCOSE BLOOD TEST: CPT

## 2022-10-04 PROCEDURE — 84100 ASSAY OF PHOSPHORUS: CPT

## 2022-10-04 PROCEDURE — 83735 ASSAY OF MAGNESIUM: CPT

## 2022-10-04 PROCEDURE — 85025 COMPLETE CBC W/AUTO DIFF WBC: CPT

## 2022-10-04 PROCEDURE — 82533 TOTAL CORTISOL: CPT

## 2022-10-04 PROCEDURE — 82947 ASSAY GLUCOSE BLOOD QUANT: CPT

## 2022-10-04 PROCEDURE — 99232 SBSQ HOSP IP/OBS MODERATE 35: CPT | Performed by: STUDENT IN AN ORGANIZED HEALTH CARE EDUCATION/TRAINING PROGRAM

## 2022-10-04 RX ORDER — INSULIN GLARGINE 100 [IU]/ML
3 INJECTION, SOLUTION SUBCUTANEOUS ONCE
Status: COMPLETED | OUTPATIENT
Start: 2022-10-04 | End: 2022-10-04

## 2022-10-04 RX ORDER — INSULIN LISPRO 100 [IU]/ML
2 INJECTION, SOLUTION INTRAVENOUS; SUBCUTANEOUS NIGHTLY PRN
Status: DISCONTINUED | OUTPATIENT
Start: 2022-10-04 | End: 2022-10-05 | Stop reason: HOSPADM

## 2022-10-04 RX ORDER — INSULIN GLARGINE 100 [IU]/ML
25 INJECTION, SOLUTION SUBCUTANEOUS DAILY
Status: DISCONTINUED | OUTPATIENT
Start: 2022-10-05 | End: 2022-10-04

## 2022-10-04 RX ORDER — INSULIN LISPRO 100 [IU]/ML
6 INJECTION, SOLUTION INTRAVENOUS; SUBCUTANEOUS
Status: DISCONTINUED | OUTPATIENT
Start: 2022-10-04 | End: 2022-10-04

## 2022-10-04 RX ORDER — INSULIN GLARGINE 100 [IU]/ML
22 INJECTION, SOLUTION SUBCUTANEOUS DAILY
Status: DISCONTINUED | OUTPATIENT
Start: 2022-10-05 | End: 2022-10-05 | Stop reason: HOSPADM

## 2022-10-04 RX ORDER — INSULIN LISPRO 100 [IU]/ML
3 INJECTION, SOLUTION INTRAVENOUS; SUBCUTANEOUS ONCE
Status: COMPLETED | OUTPATIENT
Start: 2022-10-04 | End: 2022-10-04

## 2022-10-04 RX ORDER — INSULIN LISPRO 100 [IU]/ML
0-6 INJECTION, SOLUTION INTRAVENOUS; SUBCUTANEOUS NIGHTLY
Status: DISCONTINUED | OUTPATIENT
Start: 2022-10-04 | End: 2022-10-05 | Stop reason: HOSPADM

## 2022-10-04 RX ORDER — INSULIN LISPRO 100 [IU]/ML
0-12 INJECTION, SOLUTION INTRAVENOUS; SUBCUTANEOUS
Status: DISCONTINUED | OUTPATIENT
Start: 2022-10-04 | End: 2022-10-05 | Stop reason: HOSPADM

## 2022-10-04 RX ORDER — INSULIN LISPRO 100 [IU]/ML
0-6 INJECTION, SOLUTION INTRAVENOUS; SUBCUTANEOUS ONCE
Status: COMPLETED | OUTPATIENT
Start: 2022-10-05 | End: 2022-10-05

## 2022-10-04 RX ORDER — INSULIN LISPRO 100 [IU]/ML
5 INJECTION, SOLUTION INTRAVENOUS; SUBCUTANEOUS
Status: DISCONTINUED | OUTPATIENT
Start: 2022-10-05 | End: 2022-10-05 | Stop reason: HOSPADM

## 2022-10-04 RX ADMIN — METFORMIN HYDROCHLORIDE 1 TABLET: 500 TABLET, EXTENDED RELEASE ORAL at 08:29

## 2022-10-04 RX ADMIN — INSULIN LISPRO 4 UNITS: 100 INJECTION, SOLUTION INTRAVENOUS; SUBCUTANEOUS at 16:48

## 2022-10-04 RX ADMIN — INSULIN GLARGINE 22 UNITS: 100 INJECTION, SOLUTION SUBCUTANEOUS at 07:19

## 2022-10-04 RX ADMIN — INSULIN LISPRO 8 UNITS: 100 INJECTION, SOLUTION INTRAVENOUS; SUBCUTANEOUS at 07:20

## 2022-10-04 RX ADMIN — INSULIN LISPRO 6 UNITS: 100 INJECTION, SOLUTION INTRAVENOUS; SUBCUTANEOUS at 16:48

## 2022-10-04 RX ADMIN — INSULIN LISPRO 4 UNITS: 100 INJECTION, SOLUTION INTRAVENOUS; SUBCUTANEOUS at 07:19

## 2022-10-04 RX ADMIN — INSULIN LISPRO 2 UNITS: 100 INJECTION, SOLUTION INTRAVENOUS; SUBCUTANEOUS at 20:39

## 2022-10-04 RX ADMIN — INSULIN LISPRO 3 UNITS: 100 INJECTION, SOLUTION INTRAVENOUS; SUBCUTANEOUS at 08:31

## 2022-10-04 RX ADMIN — ACETAMINOPHEN 650 MG: 325 TABLET ORAL at 20:32

## 2022-10-04 RX ADMIN — INSULIN GLARGINE 3 UNITS: 100 INJECTION, SOLUTION SUBCUTANEOUS at 08:31

## 2022-10-04 RX ADMIN — INSULIN LISPRO 6 UNITS: 100 INJECTION, SOLUTION INTRAVENOUS; SUBCUTANEOUS at 11:19

## 2022-10-04 RX ADMIN — INSULIN LISPRO 3 UNITS: 100 INJECTION, SOLUTION INTRAVENOUS; SUBCUTANEOUS at 20:36

## 2022-10-04 RX ADMIN — INSULIN LISPRO 6 UNITS: 100 INJECTION, SOLUTION INTRAVENOUS; SUBCUTANEOUS at 11:18

## 2022-10-04 ASSESSMENT — PAIN DESCRIPTION - ORIENTATION: ORIENTATION: RIGHT;LEFT

## 2022-10-04 ASSESSMENT — PAIN - FUNCTIONAL ASSESSMENT: PAIN_FUNCTIONAL_ASSESSMENT: ACTIVITIES ARE NOT PREVENTED

## 2022-10-04 ASSESSMENT — PAIN DESCRIPTION - LOCATION: LOCATION: HEAD

## 2022-10-04 ASSESSMENT — PAIN SCALES - GENERAL: PAINLEVEL_OUTOF10: 6

## 2022-10-04 ASSESSMENT — PAIN DESCRIPTION - DESCRIPTORS: DESCRIPTORS: ACHING;DULL

## 2022-10-04 NOTE — PROGRESS NOTES
Message sent to Dr. Christine Tay via [employer] regarding the high blood sugar.       Electronically signed by Reina Gregorio RN on 10/4/2022 at 7:35 AM

## 2022-10-04 NOTE — CONSULTS
Comprehensive Nutrition Assessment    Type and Reason for Visit:  Initial, Consult    Nutrition Recommendations/Plan:   Continue current diet with 3 meals and 3 snacks, as regimen appropriate for pregnancy with diabetes. Continue inpatient monitoring     Malnutrition Assessment:  Malnutrition Status:  Mild malnutrition (10/04/22 1546)    Context:  Chronic Illness     Findings of the 6 clinical characteristics of malnutrition:  Energy Intake:  Mild decrease in energy intake (Comment) (DKA, N/V)  Weight Loss:  No significant weight loss     Body Fat Loss:  No significant body fat loss     Muscle Mass Loss:  Mild muscle mass loss Clavicles (pectoralis & deltoids)  Fluid Accumulation:  No significant fluid accumulation     Strength:  Not Performed    Nutrition Assessment:    Pt admit 2/2 DKA. Currently also 5 wks pregnant. PMH=type I DM, marijuana use, DKA. Consult re: Meal plan with 3 meals and 3 snacks. Pt now on 4 carbs/meal. Will provide 3 carbs at meals and 1-2 carbs each at 3 snacks. Perhaps this will decrease likelihood of hunger and binge eating and promote BGL control, which is priority, especially during pregnancy(Hgb A1c 13.3%). Pt has been seen by inpatient dietitians and Diabetes Ed on previous occasions. Will continue inpt monitoring and reinforce CHO controlled principles while inpatient but also recommend follow-up as outpt with Diabetes Education. Nutrition Related Findings:    A&Ox4, soft abd +BS, +I/O 3.3L, no edema, hyperglycemia Wound Type: None       Current Nutrition Intake & Therapies:    Average Meal Intake: 26-50%  Average Supplements Intake: None Ordered  ADULT DIET; Regular; 4 carb choices (60 gm/meal)    Anthropometric Measures:  Height: 5' 1\" (154.9 cm)  Ideal Body Weight (IBW): 105 lbs (48 kg)    Admission Body Weight: 104 lb 15 oz (47.6 kg) (10/2 bedscale)  Current Body Weight: 107 lb (48.5 kg), 101.9 % IBW.  Weight Source: Bed Scale (10/4)  Current BMI (kg/m2): 20.2  Usual Body Weight: 109 lb 9 oz (49.7 kg) (per EMR x 6 mo)  % Weight Change (Calculated): -2.3                    BMI Categories: Normal Weight (BMI 18.5-24. 9)    Estimated Daily Nutrient Needs:  Energy Requirements Based On: Kcal/kg  Weight Used for Energy Requirements: Admission  Energy (kcal/day):  (25-30 kcal/kg) during 1st trimester  Weight Used for Protein Requirements: Admission  Protein (g/day): 55-65 (1.2-1.4 g/kg)  Method Used for Fluid Requirements: ml/Kg  Fluid (ml/day):  (30 ml/kg)    Nutrition Diagnosis:   Inadequate oral intake related to endocrine dysfuntion as evidenced by poor intake prior to admission, intake 26-50%    Nutrition Interventions:   Food and/or Nutrient Delivery: Continue Current Diet, Snacks (Comment) (Will provide 3 meals with 3 snacks within meal plan)  Nutrition Education/Counseling: Education needed (per Diabetes Ed, pt agreeable to pursue individual MNT for Diabetes self-management as an outpatient)  Coordination of Nutrition Care: Continue to monitor while inpatient  Plan of Care discussed with: Diabetes Ed Manager, who contacted Endocrinologist re: 4 carb/meal, insulin coverage for snacks    Goals:     Goals: PO intake 75% or greater, by next RD assessment       Nutrition Monitoring and Evaluation:   Behavioral-Environmental Outcomes: None Identified  Food/Nutrient Intake Outcomes: Food and Nutrient Intake  Physical Signs/Symptoms Outcomes: Biochemical Data, Nausea or Vomiting, GI Status, Fluid Status or Edema, Nutrition Focused Physical Findings, Skin, Weight    Discharge Planning:    Recommend pursue outpatient diabetes education     Lisa Carpenter RD, CNSC, LD  Contact: [phone]

## 2022-10-04 NOTE — PROGRESS NOTES
[facility] Progress Note    Admitting Date and Time: 10/2/2022 12:30 PM  Admit Dx: DKA, type 1, not at goal [facility]) [E10.10]    Subjective:  Patient is being followed for DKA, type 1, not at goal [facility]) [E10.10]   Pt feels better, tolerating diet, changed to 3 carb diet. Per RN: B.S high, ate late without insulin coverage. ROS: denies fever, chills, cp, sob, n/v, HA unless stated above.       insulin lispro  0-12 Units SubCUTAneous TID WC    insulin lispro  6 Units SubCUTAneous TID WC    [START ON 10/5/2022] insulin glargine  25 Units SubCUTAneous Daily    insulin lispro  0-6 Units SubCUTAneous Nightly    prenatal vitamin  1 tablet Oral Daily     acetaminophen, 650 mg, Q6H PRN  ondansetron, 4 mg, Q6H PRN  glucose, 4 tablet, PRN  dextrose bolus, 125 mL, PRN   Or  dextrose bolus, 250 mL, PRN  glucagon (rDNA), 1 mg, PRN  dextrose, , Continuous PRN       Objective:    BP (!) 141/98   Pulse (!) 108   Temp 98 °F (36.7 °C) (Oral)   Resp 16   Ht 5' 1\" (1.549 m)   Wt 107 lb (48.5 kg)   SpO2 100%   BMI 20.22 kg/m²     General Appearance: alert and oriented to person, place and time and in no acute distress  Skin: warm and dry  Head: normocephalic and atraumatic  Eyes: pupils equal, round, and reactive to light, extraocular eye movements intact, conjunctivae normal  Neck: neck supple and non tender without mass   Pulmonary/Chest: clear to auscultation bilaterally- no wheezes, rales or rhonchi, normal air movement, no respiratory distress  Cardiovascular: normal rate, tachycardiac, normal S1 and S2 and no carotid bruits  Abdomen: soft, non-tender, non-distended, normal bowel sounds, no masses or organomegaly  Extremities: no cyanosis, no clubbing and no edema  Neurologic: no cranial nerve deficit and speech normal        Recent Labs     10/03/22  0045 10/03/22  0516 10/04/22  0629   * 129* 128*   K 4.0 4.6 4.5    102 97*   CO2 18* 16* 18*   BUN 11 9 20   CREATININE 0.5 0.4* 0.6   GLUCOSE 136* 283* 560*  498*   CALCIUM 8.7 8.8 9.0       Recent Labs     10/02/22  1307 10/03/22  0516 10/04/22  0629   WBC 10.1 9.1 7.3   RBC 4.90 4.12 4.29   HGB 12.9 11.1* 11.5   HCT 38.8 32.7* 33.8*   MCV 79.2* 79.4* 78.8*   MCH 26.3 26.9 26.8   MCHC 33.2 33.9 34.0   RDW 14.4 14.3 14.6    296 304   MPV 11.8 11.8 11.2       Micro:  No components found for: [facility])    Radiology:   US OB TRANSVAGINAL    Result Date: 10/2/2022  EXAMINATION: FIRST TRIMESTER OBSTETRIC ULTRASOUND; DOPPLER EVALUATION OF THE PELVIS 10/2/2022 TECHNIQUE: Transvaginal first trimester obstetric pelvic ultrasound was performed with color Doppler flow evaluation.; Duplex ultrasound using B-mode/gray scaled imaging and Doppler spectral analysis and color flow was obtained of the pelvis. COMPARISON: Ultrasound pelvis from June 5, 2022 HISTORY: ORDERING SYSTEM PROVIDED HISTORY: right lwoer pelvic cramping; +ur preg today, LMP 8/30 TECHNOLOGIST PROVIDED HISTORY: Reason for exam:->right lwoer pelvic cramping; +ur preg today, LMP 8/30 What reading provider will be dictating this exam?->CRC FINDINGS: Quantitative beta HCG level was 891 mIU/mL Uterus: 8.0 cm. Endometrium is heterogeneous and thickened measuring 18 mm. No uterine masses. The cervix appears to be closed. Gestational Sac(s):  Tiny round fluid collection near the body/fundal endometrium. If this does represent a tiny gestational sac the mean sac diameter is 0.21 cm. No fetal pole or yolk sac seen at this time. Right ovary: 3.4 x 1.9 x 2.3 cm. Normal appearance of the right ovary. Normal color flow and arterial/venous spectral waveforms. No right adnexal mass. Left ovary: 3.9 x 1.8 x 2.9 cm. Normal appearance of the left ovary. Possible early corpus luteum cyst on the left. Normal color flow and arterial/venous spectral waveforms. No left adnexal mass. Free fluid: Simple appearing free fluid identified in the pelvis.  Measurements: Clinical age provided is 5 weeks and 2 days Gestational age by ultrasound if the small round collection represents an early gestational sac is 4 weeks and 5 days. 1.  Tiny round fluid collection in the body/fundal endometrial region which may represent an early gestational sac. The mean sac diameter would be 0.21 cm. Neither a fetal pole or yolk sac seen at this time. Clinical age provided is 5 weeks and 2 days. 2.  Endometrium is heterogeneous and thickened measuring 18 mm. Possible corpus luteum cyst on the left. Otherwise normal appearance of the ovaries. There are no adnexal masses. Normal ovarian vascularity. 3.  Small amount of simple appearing free fluid in the pelvis. Recommendation: Suggest trending quantitative beta HCG levels with repeat pelvic ultrasound in 4-5 weeks. Imaging should occur sooner for worsening symptoms. US DUP ABD PEL RETRO SCROT COMPLETE    Result Date: 10/2/2022  EXAMINATION: FIRST TRIMESTER OBSTETRIC ULTRASOUND; DOPPLER EVALUATION OF THE PELVIS 10/2/2022 TECHNIQUE: Transvaginal first trimester obstetric pelvic ultrasound was performed with color Doppler flow evaluation.; Duplex ultrasound using B-mode/gray scaled imaging and Doppler spectral analysis and color flow was obtained of the pelvis. COMPARISON: Ultrasound pelvis from June 5, 2022 HISTORY: ORDERING SYSTEM PROVIDED HISTORY: right lwoer pelvic cramping; +ur preg today, LMP 8/30 TECHNOLOGIST PROVIDED HISTORY: Reason for exam:->right lwoer pelvic cramping; +ur preg today, LMP 8/30 What reading provider will be dictating this exam?->CRC FINDINGS: Quantitative beta HCG level was 891 mIU/mL Uterus: 8.0 cm. Endometrium is heterogeneous and thickened measuring 18 mm. No uterine masses. The cervix appears to be closed. Gestational Sac(s):  Tiny round fluid collection near the body/fundal endometrium. If this does represent a tiny gestational sac the mean sac diameter is 0.21 cm. No fetal pole or yolk sac seen at this time. Right ovary: 3.4 x 1.9 x 2.3 cm. Normal appearance of the right ovary. Normal color flow and arterial/venous spectral waveforms. No right adnexal mass. Left ovary: 3.9 x 1.8 x 2.9 cm. Normal appearance of the left ovary. Possible early corpus luteum cyst on the left. Normal color flow and arterial/venous spectral waveforms. No left adnexal mass. Free fluid: Simple appearing free fluid identified in the pelvis. Measurements: Clinical age provided is 5 weeks and 2 days Gestational age by ultrasound if the small round collection represents an early gestational sac is 4 weeks and 5 days. 1.  Tiny round fluid collection in the body/fundal endometrial region which may represent an early gestational sac. The mean sac diameter would be 0.21 cm. Neither a fetal pole or yolk sac seen at this time. Clinical age provided is 5 weeks and 2 days. 2.  Endometrium is heterogeneous and thickened measuring 18 mm. Possible corpus luteum cyst on the left. Otherwise normal appearance of the ovaries. There are no adnexal masses. Normal ovarian vascularity. 3.  Small amount of simple appearing free fluid in the pelvis. Recommendation: Suggest trending quantitative beta HCG levels with repeat pelvic ultrasound in 4-5 weeks. Imaging should occur sooner for worsening symptoms. Assessment:    Active Problems:    * No active hospital problems. *  Resolved Problems:    * No resolved hospital problems. *      Plan:  DKA - reports compliance, was vomiting on admission, s/p management in ICU per DKA protocol now on diet and SQ insulin, endo consulted, home insulin regimen being titrated per endo. Ua normal, chest xray not obtained. Monitor electrolytes, titrate insulin and fluids per bmp. NPO  New diagnosis of pregnancy -  5 weeks 2 days, LMP Aug 26th. Reports this is her first pregnancy. Marijuana abuse - advised to stop during pg. DVT Prophylaxis - SCDs    Stepdown to intermediate/ Med surg care 10/3.     NOTE: This report was transcribed using voice recognition software. Every effort was made to ensure accuracy; however, inadvertent computerized transcription errors may be present.   Electronically signed by Jeanine Lockhart MD on 10/4/2022 at 11:21 AM

## 2022-10-04 NOTE — PROGRESS NOTES
P Quality Flow/Interdisciplinary Rounds Progress Note        Quality Flow Rounds held on October 4, 2022    Disciplines Attending:  Bedside Nurse, , , and Nursing Unit Leadership    Caitie Ruggiero was admitted on 10/2/2022 12:30 PM    Anticipated Discharge Date:       Disposition:    Gavino Score:  Gavino Scale Score: 22    Readmission Risk              Risk of Unplanned Readmission:  22           Discussed patient goal for the day, patient clinical progression, and barriers to discharge.   The following Goal(s) of the Day/Commitment(s) have been identified:  Diagnostics - Report Results and Labs - Report Results      Jacey Moss RN  October 4, 2022

## 2022-10-04 NOTE — PROGRESS NOTES
ENDOCRINOLOGY PROGRESS NOTE   Via Tele-Health Service       Date of admission: 10/2/2022  Date of service: 10/4/2022  Admitting physician: London Martinez MD   Primary Care Physician: Da Conte DO  Consultant physician: Tyson Boxer MD      This visit was performed as a virtual video visit using a synchronous, two-way, audio-video telehealth technology platform. This Virtual  Visit was conducted, with patient's consent      Reason for the consultation:  Poorly controled DM     History of Present Illness:  Services were provided through a video synchronous discussion     Kathrine Rincon is a 27 y.o. old female with PMH of DM type 1 admitted to [facility] on 10/2/2022 because of N/V and malaise and found to be in DKA, endocrine team was consulted for diabetes management. Subjective   Pt was seen this AM through Tele-Health service , BG was very high but she ate late last night without insulin coverage     Inpatient diet:   Carb Restricted diet     Point of care glucose monitoring (Independently reviewed)   Recent Labs     10/03/22  0355 10/03/22  0510 10/03/22  0612 10/03/22  0755 10/03/22  1148 10/03/22  1609 10/03/22  2026 10/04/22  0601   GLUMET 235* 279* 222* 268* 167* 387* 224* >500*     Scheduled Meds:   insulin lispro  0-12 Units SubCUTAneous TID WC    insulin lispro  6 Units SubCUTAneous TID WC    [START ON 10/5/2022] insulin glargine  25 Units SubCUTAneous Daily    insulin glargine  3 Units SubCUTAneous Once    insulin lispro  3 Units SubCUTAneous Once    insulin lispro  0-6 Units SubCUTAneous Nightly    prenatal vitamin  1 tablet Oral Daily     PRN Meds:   acetaminophen, 650 mg, Q6H PRN  ondansetron, 4 mg, Q6H PRN  glucose, 4 tablet, PRN  dextrose bolus, 125 mL, PRN   Or  dextrose bolus, 250 mL, PRN  glucagon (rDNA), 1 mg, PRN  dextrose, , Continuous PRN    Continuous Infusions:   dextrose           Review of Systems  All systems reviewed.  All negative except for symptoms mentioned in HPI OBJECTIVE    BP (!) 141/98   Pulse (!) 108   Temp 98 °F (36.7 °C) (Oral)   Resp 16   Ht 5' 1\" (1.549 m)   Wt 107 lb (48.5 kg)   SpO2 100%   BMI 20.22 kg/m²     Intake/Output Summary (Last 24 hours) at 10/4/2022 0806  Last data filed at 10/4/2022 6778  Gross per 24 hour   Intake 480 ml   Output --   Net 480 ml     Physical examination:  Due to this being a TeleHealth encounter, evaluation of the following organ systems is limited: Vitals/Constitutional/EENT/Resp/CV/GI//MS/Neuro/Skin/Heme-Lymph-Imm. Modified physical exam through Telemedicine camera    General: Communicating well via camera   Neck: no obvious neck mass. No obvious neck deformity     CVS: no distress   Chest: no distress. Chest is moving with respiration    Extremities:  no visible tremor  Skin: No visible rashes as seen from camera   Musculoskeletal: no visible deformity  Neuro: Alert and oriented to person, place, and time. Psychiatric: Normal mood and affect. Behavior is normal    Review of Laboratory Data:  I personally reviewed the following labs:   Recent Labs     10/02/22  1307 10/03/22  0516 10/04/22  0629   WBC 10.1 9.1 7.3   RBC 4.90 4.12 4.29   HGB 12.9 11.1* 11.5   HCT 38.8 32.7* 33.8*   MCV 79.2* 79.4* 78.8*   MCH 26.3 26.9 26.8   MCHC 33.2 33.9 34.0   RDW 14.4 14.3 14.6    296 304   MPV 11.8 11.8 11.2     Recent Labs     10/02/22  1526 10/02/22  1808 10/03/22  0045 10/03/22  0516 10/04/22  0629   *   < > 128* 129* 128*   K 4.3   < > 4.0 4.6 4.5   CL 94*   < > 101 102 97*   CO2 15*   < > 18* 16* 18*   BUN 17   < > 11 9 20   CREATININE 0.5   < > 0.5 0.4* 0.6   GLUCOSE 476*   < > 136* 283* 560*  498*   CALCIUM 9.7   < > 8.7 8.8 9.0   PROT 6.7  --   --   --   --    LABALBU 3.5  --   --   --   --    BILITOT 0.4  --   --   --   --    ALKPHOS 73  --   --   --   --    AST 15  --   --   --   --    ALT 11  --   --   --   --     < > = values in this interval not displayed.      Beta-Hydroxybutyrate   Date Value Ref Range Status   10/02/2022 3.86 (H) 0.02 - 0.27 mmol/L Final   08/15/2022 >4.50 (H) 0.02 - 0.27 mmol/L Final   06/24/2022 >4.50 (H) 0.02 - 0.27 mmol/L Final     Lab Results   Component Value Date/Time    LABA1C 13.3 10/03/2022 05:16 AM    LABA1C 14.6 08/16/2022 05:20 AM    LABA1C 14.5 06/06/2022 03:05 AM     Lab Results   Component Value Date/Time    TSH 0.489 01/08/2022 12:13 AM     Lab Results   Component Value Date/Time    LABA1C 13.3 10/03/2022 05:16 AM    GLUCOSE 560 10/04/2022 06:29 AM    GLUCOSE 498 10/04/2022 06:29 AM    MALBCR 53.2 08/24/2021 03:09 PM    LABMICR 81.9 08/24/2021 03:09 PM    LABCREA 154 08/24/2021 03:09 PM     Lab Results   Component Value Date/Time    TRIG 337 01/08/2022 12:13 AM    HDL 48 01/08/2022 12:13 AM    LDLCALC 129 01/08/2022 12:13 AM    CHOL 244 01/08/2022 12:13 AM       Blood culture   Lab Results   Component Value Date/Time    BC 5 Days no growth 08/15/2022 04:18 PM    BC 5 Days no growth 02/24/2021 10:00 AM       Radiology:  US DUP ABD PEL RETRO SCROT COMPLETE   Final Result   1. Tiny round fluid collection in the body/fundal endometrial region which   may represent an early gestational sac. The mean sac diameter would be 0.21   cm. Neither a fetal pole or yolk sac seen at this time. Clinical age   provided is 5 weeks and 2 days. 2.  Endometrium is heterogeneous and thickened measuring 18 mm. Possible   corpus luteum cyst on the left. Otherwise normal appearance of the ovaries. There are no adnexal masses. Normal ovarian vascularity. 3.  Small amount of simple appearing free fluid in the pelvis. Recommendation: Suggest trending quantitative beta HCG levels with repeat   pelvic ultrasound in 4-5 weeks. Imaging should occur sooner for worsening   symptoms. US OB TRANSVAGINAL   Final Result   1. Tiny round fluid collection in the body/fundal endometrial region which   may represent an early gestational sac. The mean sac diameter would be 0.21   cm. Neither a fetal pole or yolk sac seen at this time. Clinical age   provided is 5 weeks and 2 days. 2.  Endometrium is heterogeneous and thickened measuring 18 mm. Possible   corpus luteum cyst on the left. Otherwise normal appearance of the ovaries. There are no adnexal masses. Normal ovarian vascularity. 3.  Small amount of simple appearing free fluid in the pelvis. Recommendation: Suggest trending quantitative beta HCG levels with repeat   pelvic ultrasound in 4-5 weeks. Imaging should occur sooner for worsening   symptoms. Medical Records/Labs/Images review:   I personally reviewed and summarized previous records   All labs and imaging were reviewed independently     Bob Espinoza, a 27 y.o.-old female seen today for inpatient diabetes management     Poorly controlled type 1 DM    Patient's DM is uncontrolled due to poor compliance with insulin therapy and diet  Pt currently 5 weeks pregnant. I discussed with pt the importance of controlling DM during pregnancy. Also, discussed preprandial, 1hr post prandial, 2 hrs post prandial BG goal   BG was very high but she ate late last night without insulin coverage   We recommend following insulin regimen  Lantus 22U daily in AM   Humalog 5 units with meals   Medium dose sliding scale   Glucose check before meals and at bed time   Will titrate insulin dose based on blood glucose trend & insulin requirement     Recurrent DKA due to medications non-compliance   I have discussed with her the great importance of following the treatment plan exactly as directed in order to achieve a good medical outcome. Adjusted insulin regimen as per above      Thank you for allowing us to participate in the care of this patient. Please do not hesitate to contact us with any additional questions.      Dee Teran MD  Endocrinologist, [facility] and Endocrinology   [address] [address] [facility] [address]   Phone: [phone]  Fax: [phone]  --------------------------------------------  An electronic signature was used to authenticate this note.  Mitch Valenzuela MD on 10/4/2022 at 8:06 AM

## 2022-10-04 NOTE — CONSULTS
ENDOCRINOLOGY INITIAL CONSULTATION NOTE       Date of admission: 10/2/2022  Date of service: 10/3/2022  Admitting physician: Bartolome Draper MD   Primary Care Physician: Cathlean Duane, DO  Consultant physician: Drew Yeager MD      Reason for the consultation:  Poorly controled DM     History of Present Illness:  Services were provided through a video synchronous discussion     Ludin Sharp is a 27 y.o. old female with PMH of DM type 1 admitted to [facility] on 10/2/2022 because of N/V and malaise and found to be in DKA, endocrine team was consulted for diabetes management. She also complains of some migraines as well as blurry vision yesterday which has resolved. Having stomach cramps as well as dark stools but has been consuming a lot of Pepto-Bismol. Also complains of a amenorrhea. Multiple admissions for DKA last discharge on 8/17/2022. States she has been doing somewhat better with managing her blood sugars. However they have been up over the last few days. Does not adhere to any specific diet. ED labs significant for BGL 44, AG 19, CO2 15, BHB 3.86, vVpH 7.34 and positive for ketones, hCG quant 890.8. There is no h/o fever, chills, diarrhea, CP or Sob. Prior to admission  Type 1 DM was diagnosed at the age 8. Prior to admission, she was basaglar 22 units daily, Humalog with meals using carb ratio 1u:10g  + ss 1:50>150. self-blood glucose monitoring has been highly variable prior to admission. She is due for annual eye exam but denied any history of diabetic retinopathy.   The patient performs her own feet care and doesn't see podiatry service  Microvascular complications:  No Retinopathy, Nephropathy + Neuropathy   Macrovascular complications: no CAD, PVD, or Stroke    Lab Results   Component Value Date/Time    LABA1C 13.3 10/03/2022 05:16 AM     Inpatient diet:   Carb Restricted diet     Point of care glucose monitoring (Independently reviewed)   Recent Labs     10/03/22  0207 10/03/22  0317 10/03/22  0355 10/03/22  0510 10/03/22  0612 10/03/22  0755 10/03/22  1148 10/03/22  1609   GLUMET 139* 234* 235* 279* 222* 268* 167* 387*     Past medical history:   Past Medical History:   Diagnosis Date    Bleeding at insertion site 01/05/2018    Common femoral artery injury, right, initial encounter 01/05/2018    Depressive disorder     Diabetic ketoacidosis ([facility]     DM type 1 (diabetes mellitus, type 1) ([facility]     Marijuana abuse     Non-compliance     Trichimoniasis 11/19/2021       Past surgical history:  Past Surgical History:   Procedure Laterality Date    ABDOMEN SURGERY N/A 5/9/2019    INCISION AND DRAINAGE OF SUPRAPUBIC ABSESS performed by Juliano Solomon MD at [address] [place] [address]      last yr       Social history:   Tobacco:   reports that she has never smoked. She has never used smokeless tobacco.  Alcohol:   reports no history of alcohol use. Drugs:   reports that she does not currently use drugs after having used the following drugs: Marijuana Raquel Crain). Family history:    Family History   Problem Relation Age of Onset    Diabetes Maternal Grandmother     Diabetes Paternal Grandmother     Stroke Other     Thyroid Disease Neg Hx        Allergy and drug reactions:   No Known Allergies    Scheduled Meds:   insulin lispro  0-8 Units SubCUTAneous TID WC    insulin lispro  0-4 Units SubCUTAneous Nightly    [START ON 10/4/2022] insulin glargine  22 Units SubCUTAneous Daily    prenatal vitamin  1 tablet Oral Daily    insulin lispro  4 Units SubCUTAneous TID WC     PRN Meds:   acetaminophen, 650 mg, Q6H PRN  ondansetron, 4 mg, Q6H PRN  glucose, 4 tablet, PRN  dextrose bolus, 125 mL, PRN   Or  dextrose bolus, 250 mL, PRN  glucagon (rDNA), 1 mg, PRN  dextrose, , Continuous PRN    Continuous Infusions:   dextrose           Review of Systems  All systems reviewed.  All negative except for symptoms mentioned in HPI     OBJECTIVE    /84   Pulse (!) 110   Temp 98.7 °F (37.1 °C) (Oral)   Resp 18   Ht 5' 1\" (1.549 m)   Wt 104 lb 15 oz (47.6 kg)   SpO2 99%   BMI 19.83 kg/m²     Intake/Output Summary (Last 24 hours) at 10/3/2022 2015  Last data filed at 10/3/2022 0830  Gross per 24 hour   Intake 2552.96 ml   Output --   Net 2552.96 ml     Physical examination:  Due to this being a TeleHealth encounter, evaluation of the following organ systems is limited: Vitals/Constitutional/EENT/Resp/CV/GI//MS/Neuro/Skin/Heme-Lymph-Imm. Modified physical exam through Telemedicine camera    General: Communicating well via camera   Neck: no obvious neck mass. No obvious neck deformity     CVS: no distress   Chest: no distress. Chest is moving with respiration    Extremities:  no visible tremor  Skin: No visible rashes as seen from camera   Musculoskeletal: no visible deformity  Neuro: Alert and oriented to person, place, and time. Psychiatric: Normal mood and affect. Behavior is normal    Review of Laboratory Data:  I personally reviewed the following labs:   Recent Labs     10/02/22  1307 10/03/22  0516   WBC 10.1 9.1   RBC 4.90 4.12   HGB 12.9 11.1*   HCT 38.8 32.7*   MCV 79.2* 79.4*   MCH 26.3 26.9   MCHC 33.2 33.9   RDW 14.4 14.3    296   MPV 11.8 11.8     Recent Labs     10/02/22  1526 10/02/22  1808 10/02/22  2100 10/03/22  0045 10/03/22  0516   *  --  130* 128* 129*   K 4.3  --  3.8 4.0 4.6   CL 94*  --  100 101 102   CO2 15*  --  16* 18* 16*   BUN 17  --  14 11 9   CREATININE 0.5  --  0.5 0.5 0.4*   GLUCOSE 476*   < > 170* 136* 283*   CALCIUM 9.7  --  8.9 8.7 8.8   PROT 6.7  --   --   --   --    LABALBU 3.5  --   --   --   --    BILITOT 0.4  --   --   --   --    ALKPHOS 73  --   --   --   --    AST 15  --   --   --   --    ALT 11  --   --   --   --     < > = values in this interval not displayed.      Beta-Hydroxybutyrate   Date Value Ref Range Status   10/02/2022 3.86 (H) 0.02 - 0.27 mmol/L Final   08/15/2022 >4.50 (H) 0.02 - 0.27 mmol/L Final   06/24/2022 >4.50 (H) 0.02 - 0.27 mmol/L Final     Lab Results   Component Value Date/Time    LABA1C 13.3 10/03/2022 05:16 AM    LABA1C 14.6 08/16/2022 05:20 AM    LABA1C 14.5 06/06/2022 03:05 AM     Lab Results   Component Value Date/Time    TSH 0.489 01/08/2022 12:13 AM     Lab Results   Component Value Date/Time    LABA1C 13.3 10/03/2022 05:16 AM    GLUCOSE 283 10/03/2022 05:16 AM    MALBCR 53.2 08/24/2021 03:09 PM    LABMICR 81.9 08/24/2021 03:09 PM    LABCREA 154 08/24/2021 03:09 PM     Lab Results   Component Value Date/Time    TRIG 337 01/08/2022 12:13 AM    HDL 48 01/08/2022 12:13 AM    LDLCALC 129 01/08/2022 12:13 AM    CHOL 244 01/08/2022 12:13 AM       Blood culture   Lab Results   Component Value Date/Time    BC 5 Days no growth 08/15/2022 04:18 PM    BC 5 Days no growth 02/24/2021 10:00 AM       Radiology:  US DUP ABD PEL RETRO SCROT COMPLETE   Final Result   1. Tiny round fluid collection in the body/fundal endometrial region which   may represent an early gestational sac. The mean sac diameter would be 0.21   cm. Neither a fetal pole or yolk sac seen at this time. Clinical age   provided is 5 weeks and 2 days. 2.  Endometrium is heterogeneous and thickened measuring 18 mm. Possible   corpus luteum cyst on the left. Otherwise normal appearance of the ovaries. There are no adnexal masses. Normal ovarian vascularity. 3.  Small amount of simple appearing free fluid in the pelvis. Recommendation: Suggest trending quantitative beta HCG levels with repeat   pelvic ultrasound in 4-5 weeks. Imaging should occur sooner for worsening   symptoms. US OB TRANSVAGINAL   Final Result   1. Tiny round fluid collection in the body/fundal endometrial region which   may represent an early gestational sac. The mean sac diameter would be 0.21   cm. Neither a fetal pole or yolk sac seen at this time. Clinical age   provided is 5 weeks and 2 days.       2.  Endometrium is heterogeneous and thickened measuring 18 mm. Possible   corpus luteum cyst on the left. Otherwise normal appearance of the ovaries. There are no adnexal masses. Normal ovarian vascularity. 3.  Small amount of simple appearing free fluid in the pelvis. Recommendation: Suggest trending quantitative beta HCG levels with repeat   pelvic ultrasound in 4-5 weeks. Imaging should occur sooner for worsening   symptoms. Medical Records/Labs/Images review:   I personally reviewed and summarized previous records   All labs and imaging were reviewed independently     Bob Espinoza, a 27 y.o.-old female seen today for inpatient diabetes management     Poorly controlled type 1 DM    Patient's DM is uncontrolled due to poor compliance with insulin therapy and diet  Pt currently 5 weeks pregnant. Discussed the importance of controlling DM during pregnancy   We recommend following insulin regimen  Lantus 22U daily in AM   Humalog 4 units with meals   Medium dose sliding scale   Glucose check before meals and at bed time   Will titrate insulin dose based on blood glucose trend & insulin requirement     Recurrent DKA due to medications non-compliance   I have discussed with her the great importance of following the treatment plan exactly as directed in order to achieve a good medical outcome. Adjusted insulin regimen as per above      Thank you for allowing us to participate in the care of this patient. Please do not hesitate to contact us with any additional questions. Angel Melvin MD  Endocrinologist, [place] [address] [facility]   [address] [facility] [address]   Phone: [phone]  Fax: [phone]  --------------------------------------------  An electronic signature was used to authenticate this note.  Jenna Rodriguez MD on 10/3/2022 at 8:15 PM

## 2022-10-04 NOTE — PROGRESS NOTES
Reason for consult: Uncontrolled type 1 DM with pregnancy     A1C: 13.3%    [] Not available                     Patient states the following concerns/barriers to diabetes self-management:     [x] None       [] Medication cost   [] Food cost/availability        [] Reading  [] Hearing   [] Vision                [] Work    [] Transportation  [] No insurance  [] Physical limitations    [] Other:        Diabetes survival packet provided to:   [x] Patient     [] Other:    Information reviewed:   Definition of diabetes   Target glucose ranges/A1C   Self-monitoring of blood glucose   Prevention/symptoms/treatment of hypo-/hyperglycemia   Medication adherence   The plate method/meal planning guidelines   The benefits of exercise and recommendations   Reducing the risk of chronic complications            Post-education Assessment  [x]  Attentive to teaching  [x]  Answered questions appropriately when asked   [x]  Seems able to apply concepts to daily lifestyle  [x]  Seems motivated to do well  [x]  Verbalized an understanding of carb counting  [x]  Verbalized an understanding of insulin use  [x]  Verbalized an understanding of target glucose ranges/A1C level in pregnancy   [x]  Expresses an intent to comply with treatment plan   []  Showed very little interest in complying with treatment plan   []  Seems to have trouble applying concepts to daily lifestyle       COMMENTS:  Patient known to me from multiple previous admissions. She is pleasant and eager to follow her treatment plan, especially now that she is pregnant. Reviewed the booklet \"Diabetes Self-Management During Pregnancy\" with patient. I answered all questions. She will hopefully be able to start on an Omnipod and Dexcom CGM very soon, as insulin compliance has been a huge issue for her. Her other barrier to obtaining good glucose control is not being mindful of what she eats.  She does understand how to count carbohydrates, but she tends to eat too many carbohydrates and binge eat throughout the day. She is currently on 3 carbs per meal.  Last night, she states she ate more food late at night because she was so hungry. She then ended up with a blood glucose greater than 500 mg/dl this morning. I have consulted the clinical dietitian to make recommendations for snacks to be added to her meal plan, which is typical for patients with diabetes in pregnancy. Hopefully, more even spacing of her meals and snacks will prevent binge eating and allow for better glucose control. In addition, I reviewed the importance of balancing her carbohydrates with protein, fiber, and healthy fats to help her feel more full for longer and slow the digestion of the carbohydrate foods. She especially enjoys non-starchy vegetables, but does not always incorporate those foods into her meals. She is very agreeable to meeting with the outpatient diabetes dietitian in the near future for individualized MNT. We will arrange for this appointment to be scheduled. 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 Recommendations:   [x] Carbohydrate-controlled diet    [] Script for glucometer and supplies (per preference of patient's insurance)  [] Script for insulin pens and pen needles (if insulin is ordered at discharge for home use)   [] Consult to social work: patient has no insurance or has financial hardship  [] Inpatient consult to endocrinologist   [x] Follow up with endocrinologist as an outpatient   [] Home healthcare nursing to increase compliance to treatment plan   [x] Script for outpatient diabetes education classes (from doctor)        Thank you for this consult.     Karine Frank, RN, BSN, [facility]

## 2022-10-05 VITALS
DIASTOLIC BLOOD PRESSURE: 59 MMHG | WEIGHT: 114 LBS | TEMPERATURE: 98.1 F | SYSTOLIC BLOOD PRESSURE: 108 MMHG | RESPIRATION RATE: 16 BRPM | HEART RATE: 107 BPM | HEIGHT: 61 IN | BODY MASS INDEX: 21.52 KG/M2 | OXYGEN SATURATION: 98 %

## 2022-10-05 LAB
ANION GAP SERPL CALCULATED.3IONS-SCNC: 10 MMOL/L (ref 7–16)
BASOPHILS ABSOLUTE: 0.02 E9/L (ref 0–0.2)
BASOPHILS RELATIVE PERCENT: 0.2 % (ref 0–2)
BUN BLDV-MCNC: 28 MG/DL (ref 6–20)
CALCIUM SERPL-MCNC: 8.6 MG/DL (ref 8.6–10.2)
CHLORIDE BLD-SCNC: 99 MMOL/L (ref 98–107)
CO2: 18 MMOL/L (ref 22–29)
CREAT SERPL-MCNC: 0.6 MG/DL (ref 0.5–1)
EOSINOPHILS ABSOLUTE: 0.05 E9/L (ref 0.05–0.5)
EOSINOPHILS RELATIVE PERCENT: 0.6 % (ref 0–6)
GFR AFRICAN AMERICAN: >60
GFR NON-AFRICAN AMERICAN: >60 ML/MIN/1.73
GLUCOSE BLD-MCNC: 337 MG/DL (ref 74–99)
HCT VFR BLD CALC: 31.1 % (ref 34–48)
HEMOGLOBIN: 10.7 G/DL (ref 11.5–15.5)
IMMATURE GRANULOCYTES #: 0.02 E9/L
IMMATURE GRANULOCYTES %: 0.2 % (ref 0–5)
LYMPHOCYTES ABSOLUTE: 3.13 E9/L (ref 1.5–4)
LYMPHOCYTES RELATIVE PERCENT: 35.1 % (ref 20–42)
MAGNESIUM: 2 MG/DL (ref 1.6–2.6)
MCH RBC QN AUTO: 27.2 PG (ref 26–35)
MCHC RBC AUTO-ENTMCNC: 34.4 % (ref 32–34.5)
MCV RBC AUTO: 78.9 FL (ref 80–99.9)
METER GLUCOSE: 156 MG/DL (ref 74–99)
METER GLUCOSE: 160 MG/DL (ref 74–99)
METER GLUCOSE: 214 MG/DL (ref 74–99)
METER GLUCOSE: 350 MG/DL (ref 74–99)
METER GLUCOSE: 94 MG/DL (ref 74–99)
MONOCYTES ABSOLUTE: 0.55 E9/L (ref 0.1–0.95)
MONOCYTES RELATIVE PERCENT: 6.2 % (ref 2–12)
NEUTROPHILS ABSOLUTE: 5.14 E9/L (ref 1.8–7.3)
NEUTROPHILS RELATIVE PERCENT: 57.7 % (ref 43–80)
PDW BLD-RTO: 14.7 FL (ref 11.5–15)
PHOSPHORUS: 3.4 MG/DL (ref 2.5–4.5)
PLATELET # BLD: 302 E9/L (ref 130–450)
PMV BLD AUTO: 11.3 FL (ref 7–12)
POTASSIUM SERPL-SCNC: 4.3 MMOL/L (ref 3.5–5)
RBC # BLD: 3.94 E12/L (ref 3.5–5.5)
SODIUM BLD-SCNC: 127 MMOL/L (ref 132–146)
WBC # BLD: 8.9 E9/L (ref 4.5–11.5)

## 2022-10-05 PROCEDURE — 36415 COLL VENOUS BLD VENIPUNCTURE: CPT

## 2022-10-05 PROCEDURE — 83735 ASSAY OF MAGNESIUM: CPT

## 2022-10-05 PROCEDURE — 6370000000 HC RX 637 (ALT 250 FOR IP): Performed by: INTERNAL MEDICINE

## 2022-10-05 PROCEDURE — 85025 COMPLETE CBC W/AUTO DIFF WBC: CPT

## 2022-10-05 PROCEDURE — 99239 HOSP IP/OBS DSCHRG MGMT >30: CPT | Performed by: STUDENT IN AN ORGANIZED HEALTH CARE EDUCATION/TRAINING PROGRAM

## 2022-10-05 PROCEDURE — 84100 ASSAY OF PHOSPHORUS: CPT

## 2022-10-05 PROCEDURE — 80048 BASIC METABOLIC PNL TOTAL CA: CPT

## 2022-10-05 PROCEDURE — 82962 GLUCOSE BLOOD TEST: CPT

## 2022-10-05 RX ORDER — INSULIN PMP CART,AUT,G6/7,CNTR
EACH SUBCUTANEOUS
Qty: 30 EACH | Refills: 3 | Status: SHIPPED | OUTPATIENT
Start: 2022-10-05

## 2022-10-05 RX ORDER — INSULIN GLARGINE 100 [IU]/ML
22 INJECTION, SOLUTION SUBCUTANEOUS EVERY MORNING
Qty: 10 ADJUSTABLE DOSE PRE-FILLED PEN SYRINGE | Refills: 5 | Status: SHIPPED | OUTPATIENT
Start: 2022-10-05

## 2022-10-05 RX ORDER — PRENATAL WITH FERROUS FUM AND FOLIC ACID 3080; 920; 120; 400; 22; 1.84; 3; 20; 10; 1; 12; 200; 27; 25; 2 [IU]/1; [IU]/1; MG/1; [IU]/1; MG/1; MG/1; MG/1; MG/1; MG/1; MG/1; UG/1; MG/1; MG/1; MG/1; MG/1
1 TABLET ORAL DAILY
Qty: 30 TABLET | Refills: 0 | Status: SHIPPED | OUTPATIENT
Start: 2022-10-06

## 2022-10-05 RX ORDER — INSULIN LISPRO 100 [IU]/ML
5 INJECTION, SOLUTION INTRAVENOUS; SUBCUTANEOUS
Qty: 1 EACH | Refills: 3 | Status: SHIPPED | OUTPATIENT
Start: 2022-10-05 | End: 2022-10-05 | Stop reason: HOSPADM

## 2022-10-05 RX ORDER — INSULIN ASPART 100 [IU]/ML
INJECTION, SOLUTION INTRAVENOUS; SUBCUTANEOUS
Qty: 10 ADJUSTABLE DOSE PRE-FILLED PEN SYRINGE | Refills: 3 | Status: SHIPPED | OUTPATIENT
Start: 2022-10-05

## 2022-10-05 RX ORDER — INSULIN PMP CART,AUT,G6/7,CNTR
EACH SUBCUTANEOUS
Qty: 1 KIT | Refills: 0 | Status: SHIPPED | OUTPATIENT
Start: 2022-10-05

## 2022-10-05 RX ADMIN — INSULIN LISPRO 4 UNITS: 100 INJECTION, SOLUTION INTRAVENOUS; SUBCUTANEOUS at 11:09

## 2022-10-05 RX ADMIN — INSULIN LISPRO 5 UNITS: 100 INJECTION, SOLUTION INTRAVENOUS; SUBCUTANEOUS at 07:56

## 2022-10-05 RX ADMIN — INSULIN LISPRO 5 UNITS: 100 INJECTION, SOLUTION INTRAVENOUS; SUBCUTANEOUS at 16:26

## 2022-10-05 RX ADMIN — INSULIN LISPRO 5 UNITS: 100 INJECTION, SOLUTION INTRAVENOUS; SUBCUTANEOUS at 11:10

## 2022-10-05 RX ADMIN — INSULIN LISPRO 2 UNITS: 100 INJECTION, SOLUTION INTRAVENOUS; SUBCUTANEOUS at 07:56

## 2022-10-05 RX ADMIN — METFORMIN HYDROCHLORIDE 1 TABLET: 500 TABLET, EXTENDED RELEASE ORAL at 07:55

## 2022-10-05 RX ADMIN — INSULIN GLARGINE 22 UNITS: 100 INJECTION, SOLUTION SUBCUTANEOUS at 07:55

## 2022-10-05 RX ADMIN — INSULIN LISPRO 4 UNITS: 100 INJECTION, SOLUTION INTRAVENOUS; SUBCUTANEOUS at 03:09

## 2022-10-05 RX ADMIN — INSULIN LISPRO 2 UNITS: 100 INJECTION, SOLUTION INTRAVENOUS; SUBCUTANEOUS at 16:26

## 2022-10-05 ASSESSMENT — PAIN SCALES - GENERAL: PAINLEVEL_OUTOF10: 0

## 2022-10-05 NOTE — DISCHARGE SUMMARY
[facility] Physician Discharge Summary       No follow-up provider specified. Activity level: As tolerated     Dispo: Home      Condition on discharge: Stable     Patient ID:  Elpidio Garnett  [id number]  89 y.o.  1995    Admit date: 10/2/2022    Discharge date and time:  10/5/2022  2:54 PM    Admission Diagnoses: Active Problems:    * No active hospital problems. *  Resolved Problems:    * No resolved hospital problems. *      Discharge Diagnoses: Active Problems:    * No active hospital problems. *  Resolved Problems:    * No resolved hospital problems. *      Consults:  IP CONSULT TO DIABETES EDUCATOR  IP CONSULT TO ENDOCRINOLOGY  IP CONSULT TO DIABETES EDUCATOR  IP CONSULT TO [facility] Course:   Patient Elpidio Garnett is a 27 y.o. presented with DKA, type 1, not at goal [facility]) [E10.10]  Patient was admitted and managed for DKA - reports compliance, was vomiting on admission, s/p management in ICU per DKA protocol now on diet and SQ insulin, endo consulted, home insulin regimen being titrated per endo. Ua normal, chest xray not obtained as New diagnosis of pregnancy -  5 weeks 2 days, LMP Aug 26th. Reports this is her first pregnancy. Marijuana abuse - advised to stop during pg.     Discharge Exam:  General Appearance: alert and oriented to person, place and time and in no acute distress  Skin: warm and dry  Head: normocephalic and atraumatic  Eyes: pupils equal, round, and reactive to light, extraocular eye movements intact, conjunctivae normal  Neck: neck supple and non tender without mass   Pulmonary/Chest: clear to auscultation bilaterally- no wheezes, rales or rhonchi, normal air movement, no respiratory distress  Cardiovascular: normal rate, normal S1 and S2 and no carotid bruits  Abdomen: soft, non-tender, non-distended, normal bowel sounds, no masses or organomegaly  Extremities: no cyanosis, no clubbing and no edema  Neurologic: no cranial nerve deficit and speech normal    I/O last 3 completed shifts: In: 1080 [P.O.:1080]  Out: -   No intake/output data recorded. LABS:  Recent Labs     10/03/22  0516 10/04/22  0629 10/05/22  0239   * 128* 127*   K 4.6 4.5 4.3    97* 99   CO2 16* 18* 18*   BUN 9 20 28*   CREATININE 0.4* 0.6 0.6   GLUCOSE 283* 560*  498* 337*   CALCIUM 8.8 9.0 8.6       Recent Labs     10/03/22  0516 10/04/22  0629 10/05/22  0239   WBC 9.1 7.3 8.9   RBC 4.12 4.29 3.94   HGB 11.1* 11.5 10.7*   HCT 32.7* 33.8* 31.1*   MCV 79.4* 78.8* 78.9*   MCH 26.9 26.8 27.2   MCHC 33.9 34.0 34.4   RDW 14.3 14.6 14.7    304 302   MPV 11.8 11.2 11.3       No results for input(s): POCGLU in the last 72 hours. Imaging:  US OB TRANSVAGINAL    Result Date: 10/2/2022  EXAMINATION: FIRST TRIMESTER OBSTETRIC ULTRASOUND; DOPPLER EVALUATION OF THE PELVIS 10/2/2022 TECHNIQUE: Transvaginal first trimester obstetric pelvic ultrasound was performed with color Doppler flow evaluation.; Duplex ultrasound using B-mode/gray scaled imaging and Doppler spectral analysis and color flow was obtained of the pelvis. COMPARISON: Ultrasound pelvis from June 5, 2022 HISTORY: ORDERING SYSTEM PROVIDED HISTORY: right lwoer pelvic cramping; +ur preg today, LMP 8/30 TECHNOLOGIST PROVIDED HISTORY: Reason for exam:->right lwoer pelvic cramping; +ur preg today, LMP 8/30 What reading provider will be dictating this exam?->CRC FINDINGS: Quantitative beta HCG level was 891 mIU/mL Uterus: 8.0 cm. Endometrium is heterogeneous and thickened measuring 18 mm. No uterine masses. The cervix appears to be closed. Gestational Sac(s):  Tiny round fluid collection near the body/fundal endometrium. If this does represent a tiny gestational sac the mean sac diameter is 0.21 cm. No fetal pole or yolk sac seen at this time. Right ovary: 3.4 x 1.9 x 2.3 cm. Normal appearance of the right ovary. Normal color flow and arterial/venous spectral waveforms. No right adnexal mass.  Left ovary: 3.9 x 1.8 x 2.9 cm. Normal appearance of the left ovary. Possible early corpus luteum cyst on the left. Normal color flow and arterial/venous spectral waveforms. No left adnexal mass. Free fluid: Simple appearing free fluid identified in the pelvis. Measurements: Clinical age provided is 5 weeks and 2 days Gestational age by ultrasound if the small round collection represents an early gestational sac is 4 weeks and 5 days. 1.  Tiny round fluid collection in the body/fundal endometrial region which may represent an early gestational sac. The mean sac diameter would be 0.21 cm. Neither a fetal pole or yolk sac seen at this time. Clinical age provided is 5 weeks and 2 days. 2.  Endometrium is heterogeneous and thickened measuring 18 mm. Possible corpus luteum cyst on the left. Otherwise normal appearance of the ovaries. There are no adnexal masses. Normal ovarian vascularity. 3.  Small amount of simple appearing free fluid in the pelvis. Recommendation: Suggest trending quantitative beta HCG levels with repeat pelvic ultrasound in 4-5 weeks. Imaging should occur sooner for worsening symptoms. US DUP ABD PEL RETRO SCROT COMPLETE    Result Date: 10/2/2022  EXAMINATION: FIRST TRIMESTER OBSTETRIC ULTRASOUND; DOPPLER EVALUATION OF THE PELVIS 10/2/2022 TECHNIQUE: Transvaginal first trimester obstetric pelvic ultrasound was performed with color Doppler flow evaluation.; Duplex ultrasound using B-mode/gray scaled imaging and Doppler spectral analysis and color flow was obtained of the pelvis. COMPARISON: Ultrasound pelvis from June 5, 2022 HISTORY: ORDERING SYSTEM PROVIDED HISTORY: right lwoer pelvic cramping; +ur preg today, LMP 8/30 TECHNOLOGIST PROVIDED HISTORY: Reason for exam:->right lwoer pelvic cramping; +ur preg today, LMP 8/30 What reading provider will be dictating this exam?->CRC FINDINGS: Quantitative beta HCG level was 891 mIU/mL Uterus: 8.0 cm. Endometrium is heterogeneous and thickened measuring 18 mm. No uterine masses. The cervix appears to be closed. Gestational Sac(s):  Tiny round fluid collection near the body/fundal endometrium. If this does represent a tiny gestational sac the mean sac diameter is 0.21 cm. No fetal pole or yolk sac seen at this time. Right ovary: 3.4 x 1.9 x 2.3 cm. Normal appearance of the right ovary. Normal color flow and arterial/venous spectral waveforms. No right adnexal mass. Left ovary: 3.9 x 1.8 x 2.9 cm. Normal appearance of the left ovary. Possible early corpus luteum cyst on the left. Normal color flow and arterial/venous spectral waveforms. No left adnexal mass. Free fluid: Simple appearing free fluid identified in the pelvis. Measurements: Clinical age provided is 5 weeks and 2 days Gestational age by ultrasound if the small round collection represents an early gestational sac is 4 weeks and 5 days. 1.  Tiny round fluid collection in the body/fundal endometrial region which may represent an early gestational sac. The mean sac diameter would be 0.21 cm. Neither a fetal pole or yolk sac seen at this time. Clinical age provided is 5 weeks and 2 days. 2.  Endometrium is heterogeneous and thickened measuring 18 mm. Possible corpus luteum cyst on the left. Otherwise normal appearance of the ovaries. There are no adnexal masses. Normal ovarian vascularity. 3.  Small amount of simple appearing free fluid in the pelvis. Recommendation: Suggest trending quantitative beta HCG levels with repeat pelvic ultrasound in 4-5 weeks. Imaging should occur sooner for worsening symptoms.        Patient Instructions:      Medication List        START taking these medications      * Omnipod 5 G6 Intro (Gen 5) Kit  To use as directed     * Omnipod 5 G6 Pod (Gen 5) Misc  To change every 72hrs     prenatal vitamin 27-1 MG Tabs tablet  Take 1 tablet by mouth daily  Start taking on: October 6, 2022           * This list has 2 medication(s) that are the same as other medications prescribed for you. Read the directions carefully, and ask your doctor or other care provider to review them with you. CHANGE how you take these medications      * insulin lispro (1 Unit Dial) 100 UNIT/ML Sopn  Commonly known as: HumaLOG KwikPen  Inject 1 units per 10 grams of carbs + following sliding scale. -200 add 2U, -250 add 4U, -300 add 6U, -350 add 8U, -400 add 12U, BS over 400 add 12U  What changed: Another medication with the same name was added. Make sure you understand how and when to take each. * insulin lispro 100 UNIT/ML Soln injection vial  Commonly known as: HUMALOG  Inject 5 Units into the skin 3 times daily (with meals)  What changed: You were already taking a medication with the same name, and this prescription was added. Make sure you understand how and when to take each. * This list has 2 medication(s) that are the same as other medications prescribed for you. Read the directions carefully, and ask your doctor or other care provider to review them with you.                 CONTINUE taking these medications      BD Pen Needle Camila U/F 32G X 4 MM Misc  Generic drug: Insulin Pen Needle  Uses with insulin 4 times a day     glucose 4 g chewable tablet  Take 4 tablets by mouth as needed for Low blood sugar     Lantus SoloStar 100 UNIT/ML injection pen  Generic drug: insulin glargine  Inject 22 Units into the skin nightly               Where to Get Your Medications        These medications were sent to [address], [address] [place] [id number]      Phone: [phone]   Omnipod 5 G6 Intro (Gen 5) Kit  Omnipod 5 G6 Pod (Gen 5) Misc       These medications were sent to [address] [phone]  Bryan Weathers, [address] [place] [address]      Phone: [phone]   insulin lispro 100 UNIT/ML Soln injection vial  prenatal vitamin 27-1 MG Tabs tablet           Note that more than 30 minutes was spent in preparing discharge papers, discussing discharge with patient, medication review, etc.    Signed:  Electronically signed by Lucia Alexander MD on 10/5/2022 at 2:54 PM

## 2022-10-05 NOTE — PROGRESS NOTES
[facility] Quality Flow/Interdisciplinary Rounds Progress Note        Quality Flow Rounds held on October 5, 2022    Disciplines Attending:  Bedside Nurse, , , and Nursing Unit Leadership    Marissa Sherwood was admitted on 10/2/2022 12:30 PM    Anticipated Discharge Date:       Disposition:    Gavino Score:  Gavino Scale Score: 22    Readmission Risk              Risk of Unplanned Readmission:  27           Discussed patient goal for the day, patient clinical progression, and barriers to discharge.   The following Goal(s) of the Day/Commitment(s) have been identified:  Diagnostics - Report Results and Labs - Report Results, DC planning      Holly Kang RN  October 5, 2022

## 2022-10-05 NOTE — PLAN OF CARE
Problem: Discharge Planning  Goal: Discharge to home or other facility with appropriate resources  10/4/2022 2254 by Marshal Romero RN  Outcome: Progressing     Problem: Nutrition Deficit:  Goal: Optimize nutritional status  10/4/2022 2254 by Marshal Romero RN  Outcome: Progressing

## 2022-10-05 NOTE — PROGRESS NOTES
CLINICAL PHARMACY NOTE: MEDS TO BEDS    Total # of Prescriptions Filled: 2   The following medications were delivered to the patient:  Prenatal 27-1mg  Insulin lispro    Additional Documentation:

## 2022-10-05 NOTE — CARE COORDINATION
Social Work discharge planning    Sw met with pt this morning, and she was on her cell phone. Pt said she uses [facility] [address] [place] She said she has a PCP, Dr Radhames Hubbard,. Pt said she has local family support. She asked for Rx for insulin. Sw advised her to tell Dr what Rx she needs at discharge.    Electronically signed by Evelyn Vo on 10/5/2022 at 3:40 PM

## 2022-10-06 DIAGNOSIS — O24.011 PRE-EXISTING TYPE 1 DIABETES MELLITUS IN PREGNANCY IN FIRST TRIMESTER: ICD-10-CM

## 2022-10-12 ENCOUNTER — HOSPITAL ENCOUNTER (OUTPATIENT)
Dept: DIABETES SERVICES | Age: 27
Setting detail: THERAPIES SERIES
Discharge: HOME | End: 2022-10-12

## 2022-10-13 ENCOUNTER — OFFICE VISIT (OUTPATIENT)
Dept: PRIMARY CARE CLINIC | Age: 27
End: 2022-10-13
Payer: COMMERCIAL

## 2022-10-13 VITALS
HEART RATE: 100 BPM | DIASTOLIC BLOOD PRESSURE: 70 MMHG | BODY MASS INDEX: 21.54 KG/M2 | TEMPERATURE: 97.2 F | OXYGEN SATURATION: 99 % | SYSTOLIC BLOOD PRESSURE: 110 MMHG | WEIGHT: 114 LBS

## 2022-10-13 DIAGNOSIS — Z09 HOSPITAL DISCHARGE FOLLOW-UP: Primary | ICD-10-CM

## 2022-10-13 PROCEDURE — 1111F DSCHRG MED/CURRENT MED MERGE: CPT | Performed by: INTERNAL MEDICINE

## 2022-10-13 PROCEDURE — 99214 OFFICE O/P EST MOD 30 MIN: CPT | Performed by: INTERNAL MEDICINE

## 2022-10-13 RX ORDER — PROMETHAZINE HYDROCHLORIDE 25 MG/1
TABLET ORAL
COMMUNITY
Start: 2022-10-11

## 2022-10-13 SDOH — ECONOMIC STABILITY: FOOD INSECURITY: WITHIN THE PAST 12 MONTHS, THE FOOD YOU BOUGHT JUST DIDN'T LAST AND YOU DIDN'T HAVE MONEY TO GET MORE.: NEVER TRUE

## 2022-10-13 SDOH — ECONOMIC STABILITY: FOOD INSECURITY: WITHIN THE PAST 12 MONTHS, YOU WORRIED THAT YOUR FOOD WOULD RUN OUT BEFORE YOU GOT MONEY TO BUY MORE.: NEVER TRUE

## 2022-10-13 ASSESSMENT — SOCIAL DETERMINANTS OF HEALTH (SDOH): HOW HARD IS IT FOR YOU TO PAY FOR THE VERY BASICS LIKE FOOD, HOUSING, MEDICAL CARE, AND HEATING?: NOT HARD AT ALL

## 2022-10-13 NOTE — PROGRESS NOTES
Post-Discharge Transitional Care  Follow Up      Daniele Live   YOB: 1995    Date of Office Visit:  10/13/2022  Date of Hospital Admission: 10/2/22  Date of Hospital Discharge: 10/5/22  Risk of hospital readmission (high >=14%. Medium >=10%) :Readmission Risk Score: 20.1      Care management risk score Rising risk (score 2-5) and Complex Care (Scores >=6): No Risk Score On File     Non face to face  following discharge, date last encounter closed (first attempt may have been earlier): *No documented post hospital discharge outreach found in the last 14 days    Call initiated 2 business days of discharge: *No response recorded in the last 14 days    ASSESSMENT/PLAN:   Hospital discharge follow-up  -     [place] DISCHARGE MEDS RECONCILED W/ CURRENT OUTPATIENT MED LIST  -     [place] DISCHARGE MEDS RECONCILED W/ CURRENT OUTPATIENT MED LIST      Medical Decision Making: moderate complexity  Return in 1 month (on 11/13/2022), or if symptoms worsen or fail to improve. Subjective:   HPI:  Follow up of Hospital problems/diagnosis(es): See below      Inpatient course: Discharge summary reviewed- see chart.     Interval history/Current status: See below      Patient Active Problem List   Diagnosis    DKA, type 1 ([facility] [address]    Diabetes mellitus type 1, uncontrolled    Medical non-compliance    Leukocytosis    Elevated lactic acid level    Hyperglycemia    Pyelonephritis    Acidosis    Severe dehydration    Hypokalemia    Diabetic acidosis without coma (HCC)    Hypoglycemia    Vitamin D deficiency    Uncontrolled type 1 diabetes mellitus with hyperglycemia (HCC)    Severe protein-calorie malnutrition (HCC)    Trichomoniasis    Lactic acid acidosis    DKA, type 1, not at goal [facility])    COVID-19 virus infection    Depressive disorder    Generalized abdominal pain    Nausea, vomiting and diarrhea    Hyperglycemia due to diabetes mellitus (HCC)    Hypophosphatemia    Nausea and vomiting    Elevated BP without diagnosis of hypertension    Tachycardia    Pre-existing type 1 diabetes mellitus in pregnancy in first trimester       Medications listed as ordered at the time of discharge from hospital     Medication List            Accurate as of October 13, 2022 11:29 AM. If you have any questions, ask your nurse or doctor. CONTINUE taking these medications      BD Pen Needle Camila U/F 32G X 4 MM Misc  Generic drug: Insulin Pen Needle  Uses with insulin 4 times a day     glucose 4 g chewable tablet  Take 4 tablets by mouth as needed for Low blood sugar     Lantus SoloStar 100 UNIT/ML injection pen  Generic drug: insulin glargine  Inject 22 Units into the skin every morning     NovoLOG FlexPen 100 UNIT/ML injection pen  Generic drug: insulin aspart  Inject 6 units with meals + following sliding scale. -200 add 2U, -250 add 4U, -300 add 6U, -350 add 8U, -400 add 10U, BS over 400 add 12U. MA 30u/day     * Omnipod 5 G6 Intro (Gen 5) Kit  To use as directed     * Omnipod 5 G6 Pod (Gen 5) Misc  To change every 72hrs     prenatal vitamin 27-1 MG Tabs tablet  Take 1 tablet by mouth daily     promethazine 25 MG tablet  Commonly known as: PHENERGAN           * This list has 2 medication(s) that are the same as other medications prescribed for you. Read the directions carefully, and ask your doctor or other care provider to review them with you.                     Medications marked \"taking\" at this time  Outpatient Medications Marked as Taking for the 10/13/22 encounter (Office Visit) with Giancarlo No, DO   Medication Sig Dispense Refill    Insulin Disposable Pump (OMNIPOD 5 G6 INTRO, GEN 5,) KIT To use as directed 1 kit 0    Insulin Disposable Pump (OMNIPOD 5 G6 POD, GEN 5,) MISC To change every 72hrs 30 each 3    Prenatal Vit-Fe Fumarate-FA (PRENATAL VITAMIN) 27-1 MG TABS tablet Take 1 tablet by mouth daily 30 tablet 0    insulin glargine (LANTUS SOLOSTAR) 100 UNIT/ML injection pen Inject 22 Units into the skin every morning 10 Adjustable Dose Pre-filled Pen Syringe 5    insulin aspart (NOVOLOG FLEXPEN) 100 UNIT/ML injection pen Inject 6 units with meals + following sliding scale. -200 add 2U, -250 add 4U, -300 add 6U, -350 add 8U, -400 add 10U, BS over 400 add 12U. MA 30u/day 10 Adjustable Dose Pre-filled Pen Syringe 3    glucose 4g chewable tablet Take 4 tablets by mouth as needed for Low blood sugar 60 tablet 3    Insulin Pen Needle (BD PEN NEEDLE SHELBIE U/F) 32G X 4 MM MISC Uses with insulin 4 times a day 250 each 5        Medications patient taking as of now reconciled against medications ordered at time of hospital discharge: Yes    A comprehensive review of systems was negative except for what was noted in the HPI.     Objective:    /70   Pulse 100   Temp 97.2 °F (36.2 °C)   Wt 114 lb (51.7 kg)   LMP 03/24/2022 (Exact Date)   SpO2 99%   BMI 21.54 kg/m²   General Appearance: alert and oriented to person, place and time, well developed and well- nourished, in no acute distress  Skin: warm and dry, no rash or erythema  Head: normocephalic and atraumatic  Eyes: pupils equal, round, and reactive to light, extraocular eye movements intact, conjunctivae normal  ENT: tympanic membrane, external ear and ear canal normal bilaterally, nose without deformity, nasal mucosa and turbinates normal without polyps  Neck: supple and non-tender without mass, no thyromegaly or thyroid nodules, no cervical lymphadenopathy  Pulmonary/Chest: clear to auscultation bilaterally- no wheezes, rales or rhonchi, normal air movement, no respiratory distress  Cardiovascular: normal rate, regular rhythm, normal S1 and S2, no murmurs, rubs, clicks, or gallops, distal pulses intact, no carotid bruits  Abdomen: soft, non-tender, non-distended, normal bowel sounds, no masses or organomegaly  Extremities: no cyanosis, clubbing or edema  Musculoskeletal: normal range of motion, no joint What Is The Reason For Today's Visit?: History of Non-Melanoma Skin Cancer How Many Skin Cancers Have You Had?: more than one When Was Your Last Cancer Diagnosed?: 2015

## 2022-10-17 ENCOUNTER — HOSPITAL ENCOUNTER (EMERGENCY)
Age: 27
Discharge: LEFT BEFORE DISCHARGE | End: 2022-10-17
Payer: COMMERCIAL

## 2022-10-17 ENCOUNTER — HOSPITAL ENCOUNTER (OUTPATIENT)
Dept: DIABETES SERVICES | Age: 27
Setting detail: THERAPIES SERIES
Discharge: HOME | End: 2022-10-17

## 2022-10-17 VITALS
HEIGHT: 61 IN | HEART RATE: 106 BPM | TEMPERATURE: 98.6 F | WEIGHT: 115 LBS | DIASTOLIC BLOOD PRESSURE: 105 MMHG | OXYGEN SATURATION: 100 % | RESPIRATION RATE: 18 BRPM | BODY MASS INDEX: 21.71 KG/M2 | SYSTOLIC BLOOD PRESSURE: 127 MMHG

## 2022-10-17 LAB
ALBUMIN SERPL-MCNC: 3.9 G/DL (ref 3.5–5.2)
ALP BLD-CCNC: 92 U/L (ref 35–104)
ALT SERPL-CCNC: 63 U/L (ref 0–32)
ANION GAP SERPL CALCULATED.3IONS-SCNC: 15 MMOL/L (ref 7–16)
AST SERPL-CCNC: 39 U/L (ref 0–31)
BASOPHILS ABSOLUTE: 0.04 E9/L (ref 0–0.2)
BASOPHILS RELATIVE PERCENT: 0.3 % (ref 0–2)
BILIRUB SERPL-MCNC: 0.4 MG/DL (ref 0–1.2)
BUN BLDV-MCNC: 12 MG/DL (ref 6–20)
CALCIUM SERPL-MCNC: 9.2 MG/DL (ref 8.6–10.2)
CHLORIDE BLD-SCNC: 96 MMOL/L (ref 98–107)
CO2: 20 MMOL/L (ref 22–29)
CREAT SERPL-MCNC: 0.7 MG/DL (ref 0.5–1)
EOSINOPHILS ABSOLUTE: 0.05 E9/L (ref 0.05–0.5)
EOSINOPHILS RELATIVE PERCENT: 0.4 % (ref 0–6)
GFR SERPL CREATININE-BSD FRML MDRD: >60 ML/MIN/1.73
GLUCOSE BLD-MCNC: 243 MG/DL (ref 74–99)
GONADOTROPIN, CHORIONIC (HCG) QUANT: ABNORMAL MIU/ML
HCT VFR BLD CALC: 31.9 % (ref 34–48)
HEMOGLOBIN: 10.5 G/DL (ref 11.5–15.5)
IMMATURE GRANULOCYTES #: 0.06 E9/L
IMMATURE GRANULOCYTES %: 0.5 % (ref 0–5)
LYMPHOCYTES ABSOLUTE: 2.5 E9/L (ref 1.5–4)
LYMPHOCYTES RELATIVE PERCENT: 18.9 % (ref 20–42)
MCH RBC QN AUTO: 27.2 PG (ref 26–35)
MCHC RBC AUTO-ENTMCNC: 32.9 % (ref 32–34.5)
MCV RBC AUTO: 82.6 FL (ref 80–99.9)
MONOCYTES ABSOLUTE: 0.52 E9/L (ref 0.1–0.95)
MONOCYTES RELATIVE PERCENT: 3.9 % (ref 2–12)
NEUTROPHILS ABSOLUTE: 10.04 E9/L (ref 1.8–7.3)
NEUTROPHILS RELATIVE PERCENT: 76 % (ref 43–80)
PDW BLD-RTO: 17.2 FL (ref 11.5–15)
PLATELET # BLD: 287 E9/L (ref 130–450)
PMV BLD AUTO: 10.5 FL (ref 7–12)
POTASSIUM SERPL-SCNC: 3.7 MMOL/L (ref 3.5–5)
RBC # BLD: 3.86 E12/L (ref 3.5–5.5)
SARS-COV-2, NAAT: NOT DETECTED
SODIUM BLD-SCNC: 131 MMOL/L (ref 132–146)
TOTAL PROTEIN: 7.4 G/DL (ref 6.4–8.3)
WBC # BLD: 13.2 E9/L (ref 4.5–11.5)

## 2022-10-17 PROCEDURE — 84702 CHORIONIC GONADOTROPIN TEST: CPT

## 2022-10-17 PROCEDURE — 99283 EMERGENCY DEPT VISIT LOW MDM: CPT

## 2022-10-17 PROCEDURE — 87635 SARS-COV-2 COVID-19 AMP PRB: CPT

## 2022-10-17 PROCEDURE — 4500000002 HC ER NO CHARGE

## 2022-10-17 PROCEDURE — 80053 COMPREHEN METABOLIC PANEL: CPT

## 2022-10-17 PROCEDURE — 85025 COMPLETE CBC W/AUTO DIFF WBC: CPT

## 2022-10-17 NOTE — LETTER
[facility] ([facility]) - Diabetes Education    10/17/2022    Re:     Anushka Watt  :  1995    Dear Dr. Lenny Gardner,    Thank you for referring your patient, Anushka Watt, to Diabetes Education. We were unable to provide the prescribed diabetes education due to the following reason:    [x]  They did not show up for their appointment on: 10/17/22 and their voicemail box is full, unable to leave a message       This letter is for your records.     If we can be of any further assistance with this patient, please contact us at:  Renny Walsh 476:  [address] :  [place]. 285:  [phone]        Sincerely,    [facility] ([facility]) Diabetes Education Department  [employer] [facility] Program

## 2022-10-17 NOTE — ED NOTES
Department of Emergency Medicine  FIRST PROVIDER TRIAGE NOTE             Independent MLP           10/17/22  6:17 PM EDT    Date of Encounter: 10/17/22   MRN: [id number]      HPI: Elaine Lombardo is a 27 y.o. female who presents to the ED for Abdominal Cramping (Abd cramping starting this morning, pt 8 weeks pregnant) and Chills (Chills and sweats started this morning)   Pt presents to ED with abdominal cramping described as \"period cramps\" starting today. She reports chills and sweats. She is type 1 diabetic, reports her sugars have been good. No vomiting, no vaginal discharge or bleeding. ROS: Negative for cp, sob, back pain, fever, cough, vomiting, diarrhea, or urinary complaints. PE: Gen Appearance/Constitutional: alert  CV: tachycardic rate  Pulm: CTA bilat  GI: soft and NT     Initial Plan of Care: All treatment areas with department are currently occupied. Plan to order/Initiate the following while awaiting opening in ED: labs and ultrasound.   Initiate Treatment-Testing, Proceed toTreatment Area When Bed Available for ED Attending/MLP to Continue Care    Electronically signed by Lee Hampton PA-C   DD: 10/17/22       Lee Hampton PA-C  10/17/22 [address] [place], [place] [address]  10/17/22 2019

## 2022-10-17 NOTE — PROGRESS NOTES
Patient no showed for scheduled Diabetes Education class on 10/17/2022. Referring provider notified.

## 2022-10-20 ENCOUNTER — TELEPHONE (OUTPATIENT)
Dept: ENDOCRINOLOGY | Age: 27
End: 2022-10-20

## 2022-10-20 NOTE — TELEPHONE ENCOUNTER
Shena Jones called back and I informed her that Dr. Madison Wayne is not going to sign her BMV form at this time.

## 2022-10-20 NOTE — PROGRESS NOTES
Patient rescheduled for Diabetes Education appointment today at 10:30. Patient no show for today appointment. Called patient to reschedule. Patient rescheduled for 10/25/22 at 10:30. Notified Dr. Charlene Bourne of no show and rescheduled appointment.

## 2022-10-25 ENCOUNTER — HOSPITAL ENCOUNTER (OUTPATIENT)
Dept: DIABETES SERVICES | Age: 27
Setting detail: THERAPIES SERIES
Discharge: HOME | End: 2022-10-25

## 2022-10-25 NOTE — LETTER
ED Eye Problem HPI





- General


Chief complaint: Eye Problems


Stated complaint: RIGHT EYE SWOLLEN


Time Seen by Provider: 20 18:34


Source: patient


Mode of arrival: Ambulatory


Limitations: No Limitations





- History of Present Illness


Initial comments: 





49-year-old obese -American female with past medical history of asthma 

and COPD as well as cataract to her right eye presents emergency department 

complaining of pain and swelling which is been progressively worsening since 

this past Thursday with occasional pains radiate down the face towards her neck.

 You reports having swelling, around the eye and she woke this morning with more

redness and tearing to the and mild mucus production


MD chief complaint: eye pain, eye redness


-: Gradual, days(s) (2)


Onset Description: gradual


Location: right eye


Place: home


If Injury: none


Eye Symptoms: redness, pain





- Related Data


                                Home Medications











 Medication  Instructions  Recorded  Confirmed  Last Taken


 


ALPRAZolam [Xanax] 1 mg PO BID 18 Unknown


 


ARIPiprazole [Abilify] 5 mg PO BID 18 Unknown


 


Furosemide [Lasix TAB] 20 mg PO QDAY 18 Unknown


 


Omeprazole 40 mg PO QAM 18 Unknown


 


buPROPion XL [Wellbutrin XL] 150 mg PO QAM 18 Unknown








                                  Previous Rx's











 Medication  Instructions  Recorded  Last Taken  Type


 


Fluticasone/Salmeterol [Advair 1 puff IH BID #1 disk.w.dev 01/15/17 Unknown Rx





Diskus 250-50 mcg]    


 


HYDROcodone/APAP  [Norco 1 each PO Q8HR PRN #20 tablet 18 Unknown Rx





 mg TAB]    


 


HYDROcodone/APAP 5-325 [Norco 1 each PO Q6H PRN #10 tablet 18 Unknown Rx





5-325 mg TAB]    


 


Clindamycin [Clindamycin CAP] 150 mg PO Q6HR #40 capsule 20 Unknown Rx


 


Tobramycin [Tobrex] 1 drop OP Q4H #1 bottle 20 Unknown Rx











                                    Allergies











Allergy/AdvReac Type Severity Reaction Status Date / Time


 


ibuprofen Allergy  Shortness Verified 20 18:08





   of Breath  


 


methylprednisolone Allergy  Hives Verified 18 11:45





[From Solu-Medrol]     


 


methylprednisolone sodium Allergy  Hives Verified 16 07:55





succinate     





[From Solu-Medrol]     


 


moxifloxacin [From Avelox] Allergy  Hives Verified 18 11:45


 


moxifloxacin HCl Allergy  Hives Verified 16 07:55





[From Avelox]     


 


sulfamethoxazole Allergy  Shortness Verified 20 18:08





[From Bactrim]   of Breath  


 


trimethoprim [From Bactrim] Allergy  Shortness Verified 20 18:08





   of Breath  














ED Review of Systems


ROS: 


Stated complaint: RIGHT EYE SWOLLEN


Other details as noted in HPI





Comment: All other systems reviewed and negative





ED Past Medical Hx





- Past Medical History


Previous Medical History?: Yes


Hx Hypertension: No


Hx Congestive Heart Failure: No


Hx Diabetes: No


Hx Sickle Cell Disease: No


Hx Arthritis:  (intubated x 4)


Hx Seizures: No


Hx Asthma: Yes


Hx COPD: Yes


Hx HIV: No


Additional medical history: OBESITY.  HERNIA X 2





- Surgical History


Past Surgical History?: Yes


Hx Cholecystectomy: Yes


Additional Surgical History: hernia repair /.  





- Social History


Smoking Status: Never Smoker


Substance Use Type: Alcohol





- Medications


Home Medications: 


                                Home Medications











 Medication  Instructions  Recorded  Confirmed  Last Taken  Type


 


Fluticasone/Salmeterol [Advair 1 puff IH BID #1 disk.w.dev 01/15/17 04/08/18 

Unknown Rx





Diskus 250-50 mcg]     


 


ALPRAZolam [Xanax] 1 mg PO BID 18 Unknown History


 


ARIPiprazole [Abilify] 5 mg PO BID 18 Unknown History


 


Furosemide [Lasix TAB] 20 mg PO QDAY 18 Unknown History


 


Omeprazole 40 mg PO QAM 18 Unknown History


 


buPROPion XL [Wellbutrin XL] 150 mg PO QAM 18 Unknown History


 


HYDROcodone/APAP  [Norco 1 each PO Q8HR PRN #20 tablet 18  Unknown 

Rx





 mg TAB]     


 


HYDROcodone/APAP 5-325 [Norco 1 each PO Q6H PRN #10 tablet 18  Unknown Rx





5-325 mg TAB]     


 


Clindamycin [Clindamycin CAP] 150 mg PO Q6HR #40 capsule 20  Unknown Rx


 


Tobramycin [Tobrex] 1 drop OP Q4H #1 bottle 20  Unknown Rx














ED Physical Exam





- General


Limitations: No Limitations


General appearance: alert, in no apparent distress





- Head


Head exam: Present: atraumatic, normocephalic





- Eye


Eye exam: Present: normal appearance, PERRL, EOMI, conjunctival injection


Pupils: Present: normal accommodation





- Expanded Eye Exam


  ** Expanded


Eyelids: Erythema: Right, Swelling: Right


Pupils: Regular, Round: Bilateral (Cataract noted to the right), Reactive: 

Bilateral


Sclera/Conjunctival: Injection: Right, Exudate: Right


Posterior chamber: Normal Inspection: Left


IOP measured with: Tonopen (18 right eye)





- ENT


ENT exam: Present: normal exam, mucous membranes moist, TM's normal bilaterally





- Neck


Neck exam: Present: normal inspection, full ROM.  Absent: tenderness, 

lymphadenopathy





- Respiratory


Respiratory exam: Present: normal lung sounds bilaterally.  Absent: respiratory 

distress, wheezes, rales, rhonchi, chest wall tenderness, accessory muscle use





- Cardiovascular


Cardiovascular Exam: Present: regular rate, normal rhythm.  Absent: systolic 

murmur, diastolic murmur, rubs, gallop





- GI/Abdominal


GI/Abdominal exam: Present: soft, normal bowel sounds.  Absent: distended, 

guarding, rebound, hyperactive bowel sounds, organomegaly





- Extremities Exam


Extremities exam: Present: normal inspection, full ROM, normal capillary refill





- Back Exam


Back exam: Present: normal inspection.  Absent: CVA tenderness (R), CVA 

tenderness (L), paraspinal tenderness, vertebral tenderness





- Neurological Exam


Neurological exam: Present: alert, oriented X3, CN II-XII intact, normal gait





- Psychiatric


Psychiatric exam: Present: normal affect, normal mood.  Absent: anxious, flat 

affect





- Skin


Skin exam: Present: warm, dry, intact, normal color.  Absent: rash





ED Course


                                   Vital Signs











  20





  18:04 20:00 20:06


 


Temperature 100.4 F H  


 


Pulse Rate 89 67 


 


Respiratory 20 16 18





Rate   


 


Blood Pressure 143/97  


 


Blood Pressure  124/74 





[Left]   


 


O2 Sat by Pulse 97 100 





Oximetry   














ED Medical Decision Making





- Medical Decision Making





49-year-old female with right discomfort and some swelling is noted.  Positive 

scleral injection no recent eye trauma suspected microtrauma.  Negative Bryan 

sign.  No significant photophobia.  Bryan-Pen pressures within normal range.  

Given the history and examination of low suspicion for corneal abrasion or 

ulceration, globe rupture, retinal detachment, angle-closure glaucoma, formed 

foreign body.  Uveitis, keratitis is also in the differential and her visual 

acuity has remained unchanged from that stated prescription were provided the 

patient stated she needs some medication that was free we were able to give her 

medications on the [employer] on the [employer] list she has been instructed to follow-

up with ophthalmology for reevaluation


Critical care attestation.: 


If time is entered above; I have spent that time in minutes in the direct care 

of this critically ill patient, excluding procedure time.








ED Disposition


Clinical Impression: 


 Periorbital erythema, Conjunctivitis





Disposition: DC-01 TO HOME OR SELFCARE


Is pt being admited?: No


Does the pt Need Aspirin: No


Condition: Stable


Instructions:  Conjunctivitis (ED), Orbital Cellulitis (ED)


Prescriptions: 


Clindamycin [Clindamycin CAP] 150 mg PO Q6HR #40 capsule


Tobramycin [Tobrex] 1 drop OP Q4H #1 bottle


Referrals: 


PRIMARY CARE,MD [Primary Care Provider] - 3-5 Days


JODY PEREZ MD [Staff Physician] - 2-3 Days


[facility] [Provider Group] - 2-3 Days [facility]) - Diabetes Education    10/20/2022    Re:     Daniela Conte  :  1995    Dear Dr. Reilly Pi,     Thank you for referring your patient, Daniela Conte, to Diabetes Education. We were unable to provide the prescribed diabetes education due to the following reason:        [x]  They did not show up for their appointment on:   10/20/22           I called patient to reschedule Diabetes Education. Mulu Mathews rescheduled for 10/25/22 at 10:30. This letter is for your records.     If we can be of any further assistance with this patient, please contact us at:  Renny Walsh 476:  [address]:  [place] [facility]. 285:  [phone]        Sincerely,    [facility] Diabetes Education Department  [employer] Diabetes Education Recognized [facility] Program

## 2022-11-24 ENCOUNTER — APPOINTMENT (OUTPATIENT)
Dept: GENERAL RADIOLOGY | Age: 27
End: 2022-11-24
Payer: COMMERCIAL

## 2022-11-24 ENCOUNTER — APPOINTMENT (OUTPATIENT)
Dept: ULTRASOUND IMAGING | Age: 27
End: 2022-11-24
Payer: COMMERCIAL

## 2022-11-24 ENCOUNTER — HOSPITAL ENCOUNTER (EMERGENCY)
Age: 27
Discharge: LEFT BEFORE DISCHARGE | End: 2022-11-24
Attending: EMERGENCY MEDICINE
Payer: COMMERCIAL

## 2022-11-24 VITALS
HEART RATE: 85 BPM | WEIGHT: 113 LBS | OXYGEN SATURATION: 100 % | TEMPERATURE: 98.4 F | SYSTOLIC BLOOD PRESSURE: 140 MMHG | DIASTOLIC BLOOD PRESSURE: 106 MMHG | RESPIRATION RATE: 16 BRPM | BODY MASS INDEX: 21.35 KG/M2

## 2022-11-24 DIAGNOSIS — R19.7 DIARRHEA, UNSPECIFIED TYPE: ICD-10-CM

## 2022-11-24 DIAGNOSIS — E16.2 HYPOGLYCEMIA: ICD-10-CM

## 2022-11-24 DIAGNOSIS — T40.601A OPIATE OVERDOSE, ACCIDENTAL OR UNINTENTIONAL, INITIAL ENCOUNTER (HCC): ICD-10-CM

## 2022-11-24 DIAGNOSIS — E86.0 DEHYDRATION: Primary | ICD-10-CM

## 2022-11-24 LAB
ALBUMIN SERPL-MCNC: 5.2 G/DL (ref 3.5–5.2)
ALP BLD-CCNC: 86 U/L (ref 35–104)
ALT SERPL-CCNC: 16 U/L (ref 0–32)
ANION GAP SERPL CALCULATED.3IONS-SCNC: 17 MMOL/L (ref 7–16)
AST SERPL-CCNC: 26 U/L (ref 0–31)
BACTERIA: ABNORMAL /HPF
BASOPHILS ABSOLUTE: 0.03 E9/L (ref 0–0.2)
BASOPHILS RELATIVE PERCENT: 0.4 % (ref 0–2)
BETA-HYDROXYBUTYRATE: 1.29 MMOL/L (ref 0.02–0.27)
BILIRUB SERPL-MCNC: 0.5 MG/DL (ref 0–1.2)
BILIRUBIN URINE: NEGATIVE
BLOOD, URINE: NEGATIVE
BUN BLDV-MCNC: 8 MG/DL (ref 6–20)
CALCIUM SERPL-MCNC: 10.5 MG/DL (ref 8.6–10.2)
CHLORIDE BLD-SCNC: 97 MMOL/L (ref 98–107)
CLARITY: ABNORMAL
CO2: 24 MMOL/L (ref 22–29)
COLOR: YELLOW
CREAT SERPL-MCNC: 0.5 MG/DL (ref 0.5–1)
EOSINOPHILS ABSOLUTE: 0.01 E9/L (ref 0.05–0.5)
EOSINOPHILS RELATIVE PERCENT: 0.1 % (ref 0–6)
GFR SERPL CREATININE-BSD FRML MDRD: >60 ML/MIN/1.73
GLUCOSE BLD-MCNC: 182 MG/DL
GLUCOSE BLD-MCNC: 246 MG/DL (ref 74–99)
GLUCOSE URINE: 500 MG/DL
GONADOTROPIN, CHORIONIC (HCG) QUANT: 20.6 MIU/ML
HCT VFR BLD CALC: 44.6 % (ref 34–48)
HEMOGLOBIN: 14.4 G/DL (ref 11.5–15.5)
IMMATURE GRANULOCYTES #: 0.03 E9/L
IMMATURE GRANULOCYTES %: 0.4 % (ref 0–5)
KETONES, URINE: NEGATIVE MG/DL
LACTIC ACID: 2.3 MMOL/L (ref 0.5–2.2)
LEUKOCYTE ESTERASE, URINE: ABNORMAL
LIPASE: 13 U/L (ref 13–60)
LYMPHOCYTES ABSOLUTE: 1.75 E9/L (ref 1.5–4)
LYMPHOCYTES RELATIVE PERCENT: 23.7 % (ref 20–42)
MAGNESIUM: 2.2 MG/DL (ref 1.6–2.6)
MCH RBC QN AUTO: 26.6 PG (ref 26–35)
MCHC RBC AUTO-ENTMCNC: 32.3 % (ref 32–34.5)
MCV RBC AUTO: 82.3 FL (ref 80–99.9)
METER GLUCOSE: 182 MG/DL (ref 74–99)
MONOCYTES ABSOLUTE: 0.24 E9/L (ref 0.1–0.95)
MONOCYTES RELATIVE PERCENT: 3.3 % (ref 2–12)
NEUTROPHILS ABSOLUTE: 5.32 E9/L (ref 1.8–7.3)
NEUTROPHILS RELATIVE PERCENT: 72.1 % (ref 43–80)
NITRITE, URINE: NEGATIVE
PDW BLD-RTO: 14.5 FL (ref 11.5–15)
PH UA: 6 (ref 5–9)
PH VENOUS: 7.21 (ref 7.35–7.45)
PLATELET # BLD: 269 E9/L (ref 130–450)
PMV BLD AUTO: 10.6 FL (ref 7–12)
POTASSIUM SERPL-SCNC: 3.4 MMOL/L (ref 3.5–5)
PROTEIN UA: ABNORMAL MG/DL
RBC # BLD: 5.42 E12/L (ref 3.5–5.5)
RBC UA: ABNORMAL /HPF (ref 0–2)
SODIUM BLD-SCNC: 138 MMOL/L (ref 132–146)
SPECIFIC GRAVITY UA: <=1.005 (ref 1–1.03)
TOTAL PROTEIN: 10 G/DL (ref 6.4–8.3)
UROBILINOGEN, URINE: 0.2 E.U./DL
WBC # BLD: 7.4 E9/L (ref 4.5–11.5)
WBC UA: ABNORMAL /HPF (ref 0–5)

## 2022-11-24 PROCEDURE — 2580000003 HC RX 258: Performed by: STUDENT IN AN ORGANIZED HEALTH CARE EDUCATION/TRAINING PROGRAM

## 2022-11-24 PROCEDURE — 81001 URINALYSIS AUTO W/SCOPE: CPT

## 2022-11-24 PROCEDURE — 82962 GLUCOSE BLOOD TEST: CPT

## 2022-11-24 PROCEDURE — 85025 COMPLETE CBC W/AUTO DIFF WBC: CPT

## 2022-11-24 PROCEDURE — 82800 BLOOD PH: CPT

## 2022-11-24 PROCEDURE — 83605 ASSAY OF LACTIC ACID: CPT

## 2022-11-24 PROCEDURE — 83735 ASSAY OF MAGNESIUM: CPT

## 2022-11-24 PROCEDURE — 71045 X-RAY EXAM CHEST 1 VIEW: CPT

## 2022-11-24 PROCEDURE — 83690 ASSAY OF LIPASE: CPT

## 2022-11-24 PROCEDURE — 84702 CHORIONIC GONADOTROPIN TEST: CPT

## 2022-11-24 PROCEDURE — 87040 BLOOD CULTURE FOR BACTERIA: CPT

## 2022-11-24 PROCEDURE — 80053 COMPREHEN METABOLIC PANEL: CPT

## 2022-11-24 PROCEDURE — 99285 EMERGENCY DEPT VISIT HI MDM: CPT

## 2022-11-24 PROCEDURE — 96360 HYDRATION IV INFUSION INIT: CPT

## 2022-11-24 PROCEDURE — 93005 ELECTROCARDIOGRAM TRACING: CPT | Performed by: STUDENT IN AN ORGANIZED HEALTH CARE EDUCATION/TRAINING PROGRAM

## 2022-11-24 PROCEDURE — 76856 US EXAM PELVIC COMPLETE: CPT

## 2022-11-24 PROCEDURE — 82010 KETONE BODYS QUAN: CPT

## 2022-11-24 RX ORDER — 0.9 % SODIUM CHLORIDE 0.9 %
1000 INTRAVENOUS SOLUTION INTRAVENOUS ONCE
Status: COMPLETED | OUTPATIENT
Start: 2022-11-24 | End: 2022-11-24

## 2022-11-24 RX ADMIN — SODIUM CHLORIDE 1000 ML: 9 INJECTION, SOLUTION INTRAVENOUS at 14:15

## 2022-11-24 ASSESSMENT — ENCOUNTER SYMPTOMS
VOMITING: 0
BLOOD IN STOOL: 0
COUGH: 0
RHINORRHEA: 0
SHORTNESS OF BREATH: 0
DIARRHEA: 1

## 2022-11-24 ASSESSMENT — PAIN DESCRIPTION - LOCATION: LOCATION: BACK

## 2022-11-24 ASSESSMENT — PAIN SCALES - GENERAL: PAINLEVEL_OUTOF10: 5

## 2022-11-24 ASSESSMENT — PAIN - FUNCTIONAL ASSESSMENT: PAIN_FUNCTIONAL_ASSESSMENT: 0-10

## 2022-11-24 NOTE — ED PROVIDER NOTES
[facility]  ED Provider Note  Department of Emergency Medicine     ED Room:       Written by: Cristine Martin DO  Patient Name: Caitie Ruggiero  Attending Provider: Ruslan Poole DO  Admit Date: 2022 12:45 PM  MRN: [id number]    : 1995        No chief complaint on file. - Chief complaint    HPI   Caitie Ruggiero is a 24 y.o. female presenting to the ED for evaluation of No chief complaint on file. History obtained from patient. Patient has a past medical history of insulin dependent DM1; uses lantus and humalog, no longer has an insulin pump; also has history of previous encounters for DKA. Patient is presenting for evaluation of hypoglycemia and possibly accidental overdose. Of note, patient underwent and D&C 2 weeks ago; she took a medication the next day, possibly misoprostol but she is unsure. States that she has been having pelvic and low back pain ever since the procedure and also has noted light vaginal discharge but no further bleeding and states that discharge does not seem to be malodorous. She has not had any fevers but had significant chills and diaphoresis this morning. She states she took x2 5mg oxycodone due to the pain; she states this morning when she woke up she had marked chills and diaphoresis; she took her long-acting insulin yesterday evening and this morning but did not eat anything since late yesterday afternoon and states she did not eat much yesterday due to decreased appetite. She has also had significant fatigue over the past 2 weeks. Then today, since her symptoms began she started having diarrhea and has been having multiple loose bowel movements since arrival to the ED; denies incontinence. She denies any abnormal urinary symptoms. Patient received Narcan from EMS as well as D50 for POC glucose 50 which did improved some of her symptoms. Patient's complaints are severe in severity, and persistent.      Review of Systems Constitutional:  Positive for chills and fatigue. HENT:  Negative for congestion and rhinorrhea. Eyes:  Negative for visual disturbance. Respiratory:  Negative for cough and shortness of breath. Gastrointestinal:  Positive for diarrhea. Negative for blood in stool and vomiting. Genitourinary:  Positive for pelvic pain and vaginal discharge. Negative for vaginal bleeding. Musculoskeletal:  Negative for neck pain and neck stiffness. Neurological:  Negative for dizziness and numbness. Psychiatric/Behavioral:  Negative for suicidal ideas. Physical Exam  Constitutional:       General: She is not in acute distress. Appearance: She is ill-appearing. HENT:      Head: Normocephalic and atraumatic. Right Ear: External ear normal.      Left Ear: External ear normal.      Nose: Nose normal. No rhinorrhea. Mouth/Throat:      Mouth: Mucous membranes are dry. Pharynx: Oropharynx is clear. Eyes:      Extraocular Movements: Extraocular movements intact. Conjunctiva/sclera: Conjunctivae normal.      Pupils: Pupils are equal, round, and reactive to light. Cardiovascular:      Rate and Rhythm: Normal rate and regular rhythm. Pulses: Normal pulses. Heart sounds: Normal heart sounds. Pulmonary:      Effort: Pulmonary effort is normal. No respiratory distress. Breath sounds: Normal breath sounds. No wheezing or rales. Abdominal:      General: Abdomen is flat. Palpations: Abdomen is soft. Tenderness: There is abdominal tenderness (lower). There is no right CVA tenderness, left CVA tenderness, guarding or rebound. Comments: Moderate TTP lower abdomen/pelvic region     Musculoskeletal:         General: Normal range of motion. Cervical back: Normal range of motion and neck supple. No rigidity or tenderness. Right lower leg: No edema. Left lower leg: No edema. Skin:     General: Skin is warm and dry.       Capillary Refill: Capillary refill takes less than 2 seconds. Coloration: Skin is not jaundiced or pale. Findings: No rash. Neurological:      General: No focal deficit present. Mental Status: She is alert and oriented to person, place, and time. Sensory: No sensory deficit. Motor: No weakness. Procedures       MDM              Medical Decision Making: This is a 24 y.o. female presenting for evaluation of hypoglycemia and accidental opiate overdose. Please see HPI for further details. On my evaluation, patient initially is ill-appearing with dry mouth, pelvic TTP, and is having frequent diarrhea. She was treated with IV fluids. Glucose in the 180s after she received D50 from EMS. They also gave her Narcan, she remained alert and oriented throughout this encounter and did not require any additional Narcan. Denies SI/HI, denies AH/VH; she does not usually take oxycodone for pain and the overdose was completely unintentional.  Regarding her hypoglycemia, she has been taking her regular doses of long and short acting insulin but has not been eating as much. She states she has a big appetite currently and is going to eat well when she gets home. Her labs were reviewed and she does have low take acid 2.3, pH 7.21, beta hydroxybutyrate 1.29; anion gap 17; I suspect that these findings are all secondary to dehydration. Patient was given a liter of IV fluids and states she feels completely better and her symptoms are all resolved. She did have tenderness on palpation of her lower abdomen/pelvis but that is also resolved. An ultrasound of her pelvis shows no acute abnormality, no retained products of conception; beta hCG is 20.6, appropriately decreased based off of the history of having had a D&C 2 weeks ago.   Patient is nontoxic in appearance at this time, though with the above lab value findings I feel she would benefit from further IV hydration and repeat labs, and if these were to improve she would likely be okay to discharge home. I discussed this with the patient but she states that since she is feeling better she just really wants to go home; she would like to be discharged [address] Patient is alert and oriented and able to make her own medical decisions, is of sound mind; risks associated with leaving AMA including but not limited to permanent bodily harm and even death were discussed with the patient, she voiced understanding but still wishes to leave [address] Appropriate documentation obtained and patient was discharged AMA. Return precautions were discussed. See ED COURSE for additional MDM. EKG reviewed and interpreted by me: This EKG is signed by emergency department physician. EKG interpreted by the emergency  Physician, NSR, normal axis, no ST elevation; baseline artifact throughout; nonspecific T wave abnormalities leads II, III, aVF and aVL; rate 86, , QRS 92, QTc 500. Compared with most recent EKG from October 2022, QTc is now prolonged. Labs & imaging were reviewed and interpreted, see RESULTS. I have personally reviewed all laboratory and imaging results for this patient. I have discussed this patient with my attending, who has seen the patient and agrees with this disposition. Patient was seen and evaluated by myself and my attending Betty Randolph DO. Assessment and Plan discussed with attending provider, please see attestation for final plan of care.            --------------------------------------------- PAST HISTORY ---------------------------------------------  Past Medical History:  has a past medical history of Bleeding at insertion site, Common femoral artery injury, right, initial encounter, Depressive disorder, Diabetic ketoacidosis ([address] DM type 1 (diabetes mellitus, type 1) ([address] Marijuana abuse, Non-compliance, and Trichimoniasis.     Past Surgical History:  has a past surgical history that includes Abdomen surgery (N/A, 5/9/2019) and Bartholin gland cyst excision. Social History:  reports that she has never smoked. She has never used smokeless tobacco. She reports that she does not currently use drugs after having used the following drugs: Marijuana [employer]). She reports that she does not drink alcohol. Family History: family history includes Diabetes in her maternal grandmother and paternal grandmother; Stroke in an other family member. The patients home medications have been reviewed. Allergies: Patient has no known allergies.     -------------------------------------------------- RESULTS -------------------------------------------------  Labs:  Results for orders placed or performed during the hospital encounter of 11/24/22   CBC with Auto Differential   Result Value Ref Range    WBC 7.4 4.5 - 11.5 E9/L    RBC 5.42 3.50 - 5.50 E12/L    Hemoglobin 14.4 11.5 - 15.5 g/dL    Hematocrit 44.6 34.0 - 48.0 %    MCV 82.3 80.0 - 99.9 fL    MCH 26.6 26.0 - 35.0 pg    MCHC 32.3 32.0 - 34.5 %    RDW 14.5 11.5 - 15.0 fL    Platelets 455 557 - 345 E9/L    MPV 10.6 7.0 - 12.0 fL    Neutrophils % 72.1 43.0 - 80.0 %    Immature Granulocytes % 0.4 0.0 - 5.0 %    Lymphocytes % 23.7 20.0 - 42.0 %    Monocytes % 3.3 2.0 - 12.0 %    Eosinophils % 0.1 0.0 - 6.0 %    Basophils % 0.4 0.0 - 2.0 %    Neutrophils Absolute 5.32 1.80 - 7.30 E9/L    Immature Granulocytes # 0.03 E9/L    Lymphocytes Absolute 1.75 1.50 - 4.00 E9/L    Monocytes Absolute 0.24 0.10 - 0.95 E9/L    Eosinophils Absolute 0.01 (L) 0.05 - 0.50 E9/L    Basophils Absolute 0.03 0.00 - 0.20 E9/L   CMP   Result Value Ref Range    Sodium 138 132 - 146 mmol/L    Potassium 3.4 (L) 3.5 - 5.0 mmol/L    Chloride 97 (L) 98 - 107 mmol/L    CO2 24 22 - 29 mmol/L    Anion Gap 17 (H) 7 - 16 mmol/L    Glucose 246 (H) 74 - 99 mg/dL    BUN 8 6 - 20 mg/dL    Creatinine 0.5 0.5 - 1.0 mg/dL    Est, Glom Filt Rate >60 >=60 mL/min/1.73    Calcium 10.5 (H) 8.6 - 10.2 mg/dL    Total Protein 10.0 (H) 6.4 - 8.3 g/dL    Albumin 5.2 3.5 - 5.2 g/dL    Total Bilirubin 0.5 0.0 - 1.2 mg/dL    Alkaline Phosphatase 86 35 - 104 U/L    ALT 16 0 - 32 U/L    AST 26 0 - 31 U/L   Lipase   Result Value Ref Range    Lipase 13 13 - 60 U/L   Magnesium   Result Value Ref Range    Magnesium 2.2 1.6 - 2.6 mg/dL   Beta-Hydroxybutyrate   Result Value Ref Range    Beta-Hydroxybutyrate 1.29 (H) 0.02 - 0.27 mmol/L   PH, VENOUS   Result Value Ref Range    pH, Christoph 7.21 (L) 7.35 - 7.45   Urinalysis with Microscopic   Result Value Ref Range    Color, UA Yellow Straw/Yellow    Clarity, UA SL CLOUDY Clear    Glucose, Ur 500 (A) Negative mg/dL    Bilirubin Urine Negative Negative    Ketones, Urine Negative Negative mg/dL    Specific Gravity, UA <=1.005 1.005 - 1.030    Blood, Urine Negative Negative    pH, UA 6.0 5.0 - 9.0    Protein, UA TRACE Negative mg/dL    Urobilinogen, Urine 0.2 <2.0 E.U./dL    Nitrite, Urine Negative Negative    Leukocyte Esterase, Urine SMALL (A) Negative    WBC, UA 0-1 0 - 5 /HPF    RBC, UA 2-5 0 - 2 /HPF    Bacteria, UA RARE (A) None Seen /HPF   hCG, quantitative, pregnancy   Result Value Ref Range    hCG Quant 20.6 (H) <10 mIU/mL   Lactic Acid   Result Value Ref Range    Lactic Acid 2.3 (H) 0.5 - 2.2 mmol/L   POCT Glucose   Result Value Ref Range    Glucose 182 mg/dL   POCT Glucose   Result Value Ref Range    Meter Glucose 182 (H) 74 - 99 mg/dL       Radiology:  US PELVIS COMPLETE    Result Date: 11/24/2022  EXAMINATION: PELVIC ULTRASOUND 11/24/2022 TECHNIQUE: Transvaginal pelvic ultrasound duplex ultrasound using B-mode/gray scaled imaging, Doppler spectral analysis and color flow Doppler was obtained.  COMPARISON: None HISTORY: ORDERING SYSTEM PROVIDED HISTORY: pelvic pain, diarrhea, D&C 2 weeks ago TECHNOLOGIST PROVIDED HISTORY: Reason for exam:->pelvic pain, diarrhea, D&C 2 weeks ago What reading provider will be dictating this exam?->CRC FINDINGS: The uterus is anteverted measuring 6.5 x 3.7 x 3.8 cm.  The endometrial stripe measures 6.7 mm. Right adnexa measures 2.1 x 2.0 x 2.0 cm. Normal Doppler flow signal noted. Left adnexa measures 3.2 x 1.7 x 2.3 cm with normal Doppler flow signal. There is minimal to mild free fluid in the cul-de-sac, likely physiologic. Unremarkable pelvic ultrasound. XR CHEST PORTABLE    Result Date: 11/24/2022  EXAMINATION: ONE XRAY VIEW OF THE CHEST 11/24/2022 2:24 pm COMPARISON: 08/16/2022 HISTORY: ORDERING SYSTEM PROVIDED HISTORY: hypoglycemia, diarrhea TECHNOLOGIST PROVIDED HISTORY: Reason for exam:->hypoglycemia, diarrhea What reading provider will be dictating this exam?->CRC FINDINGS: The lungs are without acute focal process. There is no effusion or pneumothorax. The cardiomediastinal silhouette is without acute process. The osseous structures are without acute process. No acute process. ------------------------- NURSING NOTES AND VITALS REVIEWED ---------------------------  Date / Time Roomed:  11/24/2022 12:45 PM  ED Bed Assignment:  06/06    The nursing notes within the ED encounter and vital signs as below have been reviewed. BP (!) 140/106   Pulse 85   Temp 98.4 °F (36.9 °C)   Resp 16   Wt 113 lb (51.3 kg)   LMP  (LMP Unknown)   SpO2 100%   Breastfeeding Unknown   BMI 21.35 kg/m²   Oxygen Saturation Interpretation: Normal      ------------------------------------------ PROGRESS NOTES ------------------------------------------  Time: 1430  Re-evaluation. Patients symptoms are improving  Repeat physical examination is significantly improved    I have spoken with the patient and discussed todays availabe results to this point, in addition to providing specific details for the plan of care and counseling regarding the diagnosis and prognosis. Their questions are answered, however they are not agreeable to further evaluation and want to be discharged.     --------------------------------- ADDITIONAL PROVIDER NOTES ---------------------------------  This patient has chosen to leave against medical advice. I have personally explained to them that choosing to do so may result in permanent bodily harm or death. I discussed at length that without further evaluation and monitoring there may be unforeseen circumstances and deterioration resulting in permanent bodily harm or death as a result of their choice. They are alert, oriented, and competent at this time. They state that they are aware of the serious risks as explained, but they continue to wish to leave against medical   advice. In light of their decision to leave against medical advice, follow-up has been arranged and they are aware of the importance of following up as instructed. They have been advised that they should return to the ED immediately if they change their mind at any time, or if their condition begins to change or worsen. The follow up plan has been discussed in detail and they are aware of the specific conditions for emergent return, as well as the importance of follow-up. Discharge Medication List as of 11/24/2022  3:13 PM          Diagnosis:  1. Dehydration    2. Hypoglycemia    3. Opiate overdose, accidental or unintentional, initial encounter ([facility] [address]    4. Diarrhea, unspecified type        Disposition:  Patient's disposition: Left against medical advice. Patient's condition is improved, stable though workup incomplete. Casey Carranza D.O. PGY-3     Resident Physician     Emergency Medicine      11/24/2022 1:33 PM      NOTE: This report was transcribed using voice recognition software.  Every effort was made to ensure accuracy; however, inadvertent computerized transcription errors may be present             Casey Carranza DO  Resident  11/24/22 0117

## 2022-11-24 NOTE — ED NOTES
Pt states \"I took 2 pills for my back pain\", pt states she had an  2 wks ago and has had lower back pain since procedure     Drucella Boast, RN  [phone]

## 2022-11-24 NOTE — ED NOTES
23 y/o f to the ed with a c/c of hypoglycemia and possible overdose of prescription medication. Patient was found in the home per EMS with AMS and a b/s noted in the 50's. EMS reported that PIV established in the field and patient was given 25g of D50. Additionally, EMS reported that family stated that patient was c/o back pain earlier in the day, and was given oxycodone of unknown dosage. Patient noted with \"pinpoint pupils\". 2mg narcan given without change to patient's condition. Patient denies chest pain, sob or difficulty breathing. Patient denies ABD pain or n/v/d. Patient noted with + msp x 4, pupils PERRLA @ 3 and lung sounds that are clear and equil bilaterally. Patient placed on telemetry, BP and pulse ox. 12-lead EKG performed. Vitals noted as recorded. PIV placed with labs drawn and sent. Call light placed within patient's reach, and bed in lowest position with side rails up x 2 for safety. Provider at the bedside for assessment.         [facility], RN  11/24/22 2508

## 2022-11-24 NOTE — ED NOTES
2 mg IV narcan given pta, pt found with 10 mg oxyodone given to her per pt by Irwin Baldwin RN  11/24/22 5921

## 2022-11-25 LAB
EKG ATRIAL RATE: 86 BPM
EKG P AXIS: 71 DEGREES
EKG P-R INTERVAL: 154 MS
EKG Q-T INTERVAL: 418 MS
EKG QRS DURATION: 72 MS
EKG QTC CALCULATION (BAZETT): 500 MS
EKG R AXIS: 63 DEGREES
EKG T AXIS: 50 DEGREES
EKG VENTRICULAR RATE: 86 BPM

## 2022-11-25 PROCEDURE — 93010 ELECTROCARDIOGRAM REPORT: CPT | Performed by: INTERNAL MEDICINE

## 2022-11-26 ENCOUNTER — APPOINTMENT (OUTPATIENT)
Dept: CT IMAGING | Age: 27
End: 2022-11-26
Payer: COMMERCIAL

## 2022-11-26 ENCOUNTER — HOSPITAL ENCOUNTER (EMERGENCY)
Age: 27
Discharge: HOME | End: 2022-11-27
Attending: EMERGENCY MEDICINE
Payer: COMMERCIAL

## 2022-11-26 VITALS
DIASTOLIC BLOOD PRESSURE: 97 MMHG | SYSTOLIC BLOOD PRESSURE: 150 MMHG | TEMPERATURE: 98.1 F | OXYGEN SATURATION: 98 % | HEART RATE: 100 BPM | BODY MASS INDEX: 22.19 KG/M2 | RESPIRATION RATE: 18 BRPM | WEIGHT: 113 LBS | HEIGHT: 60 IN

## 2022-11-26 DIAGNOSIS — R11.2 NAUSEA AND VOMITING, UNSPECIFIED VOMITING TYPE: ICD-10-CM

## 2022-11-26 DIAGNOSIS — R10.84 GENERALIZED ABDOMINAL PAIN: Primary | ICD-10-CM

## 2022-11-26 LAB
ALBUMIN SERPL-MCNC: 4.2 G/DL (ref 3.5–5.2)
ALP BLD-CCNC: 68 U/L (ref 35–104)
ALT SERPL-CCNC: 14 U/L (ref 0–32)
ANION GAP SERPL CALCULATED.3IONS-SCNC: 17 MMOL/L (ref 7–16)
AST SERPL-CCNC: 23 U/L (ref 0–31)
BACTERIA: ABNORMAL /HPF
BASOPHILS ABSOLUTE: 0.03 E9/L (ref 0–0.2)
BASOPHILS RELATIVE PERCENT: 0.4 % (ref 0–2)
BETA-HYDROXYBUTYRATE: 2.41 MMOL/L (ref 0.02–0.27)
BILIRUB SERPL-MCNC: 0.5 MG/DL (ref 0–1.2)
BILIRUBIN URINE: NEGATIVE
BLOOD, URINE: ABNORMAL
BUN BLDV-MCNC: 8 MG/DL (ref 6–20)
CALCIUM SERPL-MCNC: 9.9 MG/DL (ref 8.6–10.2)
CHLORIDE BLD-SCNC: 100 MMOL/L (ref 98–107)
CLARITY: CLEAR
CO2: 22 MMOL/L (ref 22–29)
COLOR: YELLOW
CREAT SERPL-MCNC: 0.6 MG/DL (ref 0.5–1)
EOSINOPHILS ABSOLUTE: 0.02 E9/L (ref 0.05–0.5)
EOSINOPHILS RELATIVE PERCENT: 0.3 % (ref 0–6)
EPITHELIAL CELLS, UA: ABNORMAL /HPF
GFR SERPL CREATININE-BSD FRML MDRD: >60 ML/MIN/1.73
GLUCOSE BLD-MCNC: 224 MG/DL (ref 74–99)
GLUCOSE URINE: 500 MG/DL
HCG(URINE) PREGNANCY TEST: NEGATIVE
HCT VFR BLD CALC: 36.7 % (ref 34–48)
HEMOGLOBIN: 12.4 G/DL (ref 11.5–15.5)
IMMATURE GRANULOCYTES #: 0.02 E9/L
IMMATURE GRANULOCYTES %: 0.3 % (ref 0–5)
INFLUENZA A: NOT DETECTED
INFLUENZA B: NOT DETECTED
KETONES, URINE: 40 MG/DL
LACTIC ACID: 1.5 MMOL/L (ref 0.5–2.2)
LEUKOCYTE ESTERASE, URINE: NEGATIVE
LIPASE: 11 U/L (ref 13–60)
LYMPHOCYTES ABSOLUTE: 1.92 E9/L (ref 1.5–4)
LYMPHOCYTES RELATIVE PERCENT: 25.4 % (ref 20–42)
MCH RBC QN AUTO: 26.4 PG (ref 26–35)
MCHC RBC AUTO-ENTMCNC: 33.8 % (ref 32–34.5)
MCV RBC AUTO: 78.1 FL (ref 80–99.9)
MONOCYTES ABSOLUTE: 0.27 E9/L (ref 0.1–0.95)
MONOCYTES RELATIVE PERCENT: 3.6 % (ref 2–12)
NEUTROPHILS ABSOLUTE: 5.29 E9/L (ref 1.8–7.3)
NEUTROPHILS RELATIVE PERCENT: 70 % (ref 43–80)
NITRITE, URINE: NEGATIVE
PDW BLD-RTO: 14.4 FL (ref 11.5–15)
PH UA: 7 (ref 5–9)
PH VENOUS: 7.47 (ref 7.35–7.45)
PLATELET # BLD: 277 E9/L (ref 130–450)
PMV BLD AUTO: 10.8 FL (ref 7–12)
POTASSIUM SERPL-SCNC: 3.7 MMOL/L (ref 3.5–5)
PROTEIN UA: ABNORMAL MG/DL
RBC # BLD: 4.7 E12/L (ref 3.5–5.5)
RBC UA: ABNORMAL /HPF (ref 0–2)
SARS-COV-2 RNA, RT PCR: NOT DETECTED
SODIUM BLD-SCNC: 139 MMOL/L (ref 132–146)
SPECIFIC GRAVITY UA: 1.02 (ref 1–1.03)
TOTAL PROTEIN: 8.1 G/DL (ref 6.4–8.3)
UROBILINOGEN, URINE: 0.2 E.U./DL
WBC # BLD: 7.6 E9/L (ref 4.5–11.5)
WBC UA: ABNORMAL /HPF (ref 0–5)

## 2022-11-26 PROCEDURE — 85025 COMPLETE CBC W/AUTO DIFF WBC: CPT

## 2022-11-26 PROCEDURE — 96375 TX/PRO/DX INJ NEW DRUG ADDON: CPT

## 2022-11-26 PROCEDURE — 87636 SARSCOV2 & INF A&B AMP PRB: CPT

## 2022-11-26 PROCEDURE — 6360000004 HC RX CONTRAST MEDICATION: Performed by: RADIOLOGY

## 2022-11-26 PROCEDURE — 6360000002 HC RX W HCPCS: Performed by: PHYSICIAN ASSISTANT

## 2022-11-26 PROCEDURE — 99285 EMERGENCY DEPT VISIT HI MDM: CPT

## 2022-11-26 PROCEDURE — 83605 ASSAY OF LACTIC ACID: CPT

## 2022-11-26 PROCEDURE — 36415 COLL VENOUS BLD VENIPUNCTURE: CPT

## 2022-11-26 PROCEDURE — 80053 COMPREHEN METABOLIC PANEL: CPT

## 2022-11-26 PROCEDURE — 74177 CT ABD & PELVIS W/CONTRAST: CPT

## 2022-11-26 PROCEDURE — 2580000003 HC RX 258: Performed by: PHYSICIAN ASSISTANT

## 2022-11-26 PROCEDURE — 96374 THER/PROPH/DIAG INJ IV PUSH: CPT

## 2022-11-26 PROCEDURE — 82800 BLOOD PH: CPT

## 2022-11-26 PROCEDURE — 81025 URINE PREGNANCY TEST: CPT

## 2022-11-26 PROCEDURE — 82010 KETONE BODYS QUAN: CPT

## 2022-11-26 PROCEDURE — 83690 ASSAY OF LIPASE: CPT

## 2022-11-26 PROCEDURE — 81001 URINALYSIS AUTO W/SCOPE: CPT

## 2022-11-26 RX ORDER — KETOROLAC TROMETHAMINE 30 MG/ML
30 INJECTION, SOLUTION INTRAMUSCULAR; INTRAVENOUS ONCE
Status: COMPLETED | OUTPATIENT
Start: 2022-11-26 | End: 2022-11-26

## 2022-11-26 RX ORDER — SODIUM CHLORIDE 9 MG/ML
1000 INJECTION, SOLUTION INTRAVENOUS CONTINUOUS
Status: DISCONTINUED | OUTPATIENT
Start: 2022-11-26 | End: 2022-11-27 | Stop reason: HOSPADM

## 2022-11-26 RX ORDER — ONDANSETRON 2 MG/ML
4 INJECTION INTRAMUSCULAR; INTRAVENOUS ONCE
Status: COMPLETED | OUTPATIENT
Start: 2022-11-26 | End: 2022-11-26

## 2022-11-26 RX ORDER — METOCLOPRAMIDE HYDROCHLORIDE 5 MG/ML
10 INJECTION INTRAMUSCULAR; INTRAVENOUS ONCE
Status: COMPLETED | OUTPATIENT
Start: 2022-11-26 | End: 2022-11-26

## 2022-11-26 RX ORDER — 0.9 % SODIUM CHLORIDE 0.9 %
1000 INTRAVENOUS SOLUTION INTRAVENOUS ONCE
Status: COMPLETED | OUTPATIENT
Start: 2022-11-26 | End: 2022-11-26

## 2022-11-26 RX ADMIN — METOCLOPRAMIDE 10 MG: 5 INJECTION, SOLUTION INTRAMUSCULAR; INTRAVENOUS at 22:53

## 2022-11-26 RX ADMIN — SODIUM CHLORIDE 1000 ML: 9 INJECTION, SOLUTION INTRAVENOUS at 22:53

## 2022-11-26 RX ADMIN — ONDANSETRON 4 MG: 2 INJECTION INTRAMUSCULAR; INTRAVENOUS at 21:04

## 2022-11-26 RX ADMIN — KETOROLAC TROMETHAMINE 30 MG: 30 INJECTION, SOLUTION INTRAMUSCULAR at 21:03

## 2022-11-26 RX ADMIN — IOPAMIDOL 50 ML: 755 INJECTION, SOLUTION INTRAVENOUS at 23:20

## 2022-11-26 RX ADMIN — SODIUM CHLORIDE 1000 ML: 9 INJECTION, SOLUTION INTRAVENOUS at 20:56

## 2022-11-26 ASSESSMENT — PAIN SCALES - GENERAL: PAINLEVEL_OUTOF10: 10

## 2022-11-26 ASSESSMENT — PAIN DESCRIPTION - LOCATION: LOCATION: ABDOMEN

## 2022-11-26 ASSESSMENT — PAIN DESCRIPTION - DESCRIPTORS: DESCRIPTORS: SHARP

## 2022-11-26 ASSESSMENT — LIFESTYLE VARIABLES
HOW OFTEN DO YOU HAVE A DRINK CONTAINING ALCOHOL: NEVER
HOW MANY STANDARD DRINKS CONTAINING ALCOHOL DO YOU HAVE ON A TYPICAL DAY: PATIENT DOES NOT DRINK

## 2022-11-26 ASSESSMENT — PAIN DESCRIPTION - ORIENTATION: ORIENTATION: LOWER

## 2022-11-26 ASSESSMENT — PAIN - FUNCTIONAL ASSESSMENT: PAIN_FUNCTIONAL_ASSESSMENT: PREVENTS OR INTERFERES SOME ACTIVE ACTIVITIES AND ADLS

## 2022-11-27 RX ORDER — METOCLOPRAMIDE HYDROCHLORIDE 5 MG/ML
5 INJECTION INTRAMUSCULAR; INTRAVENOUS ONCE
Status: DISCONTINUED | OUTPATIENT
Start: 2022-11-27 | End: 2022-11-27 | Stop reason: HOSPADM

## 2022-11-27 NOTE — ED PROVIDER NOTES
ED Attending shared visit  CC: No       [address] [facility]  Department of Emergency Medicine   ED  Encounter Note  Admit Date/RoomTime: 2022  7:17 PM  ED Room:   NAME: Mirta Yang  : 1995  MRN: [id number]     Chief Complaint:  Other (Multiple complaints, seen at [facility] yesterday)    [facility]        Mirta Yang is a 24 y.o. female who presents to the ED with multiple generalized complaints. Patient is a type I diabetic. She was actually seen in the emergency room yesterday for abnormal blood sugars and altered mental status. It was felt that she was an opiate overdose and was given Narcan by EMS for that. In addition on work-up she had an elevated lactic acid, low venous pH and elevated beta hydroxy albuterol consistent with a DKA. She was hydrated with fluids and it was recommended she be admitted but patient signed out  [address] She was discharged with diagnosis of dehydration, hypoglycemia, accidental opiate overdose and diarrhea. She presents today with similar complaints. She complains of all over body aches. Chills. States she can't quit shaking. Her back hurts. Abdominal pain. Nausea and vomiting. Patient also relates that she had an  2 months ago. That was noted yesterday on work-up and a pelvic ultrasound revealed no retained products of conception and a dropping quant of only 20. She does deny any vaginal bleeding. She rates the symptoms as severe in intensity. Multiple generalized body complaints. ROS   Pertinent positives and negatives are stated within HPI, all other systems reviewed and are negative. Past Medical History:  has a past medical history of Bleeding at insertion site, Common femoral artery injury, right, initial encounter, Depressive disorder, Diabetic ketoacidosis ([address] DM type 1 (diabetes mellitus, type 1) ([address] Marijuana abuse, Non-compliance, and Trichimoniasis.     Surgical History:  has a past surgical history that includes Abdomen surgery (N/A, 5/9/2019) and Bartholin gland cyst excision. Social History:  reports that she has never smoked. She has never used smokeless tobacco. She reports current drug use. Drug: Marijuana [employer]). She reports that she does not drink alcohol. Family History: family history includes Diabetes in her maternal grandmother and paternal grandmother; Stroke in an other family member. Allergies: Patient has no known allergies. PHYSICAL EXAM   Oxygen Saturation Interpretation: Normal on room air analysis. ED Triage Vitals   BP Temp Temp src Heart Rate Resp SpO2 Height Weight   11/26/22 1905 11/26/22 1905 -- 11/26/22 1905 11/26/22 1905 11/26/22 1905 11/26/22 1930 11/26/22 1930   (!) 153/111 98 °F (36.7 °C)  (!) 108 16 99 % 5' (1.524 m) 113 lb (51.3 kg)         General:  NAD. Alert and Oriented. Ill-appearing. Positive rigors. Skin:  Warm, dry. No rashes. Head:  Normocephalic. Atraumatic. Eyes:  EOMI. Conjunctiva normal.  ENT:  Oral mucosa moist.  Airway patent. Neck:  Supple. Normal ROM. Respiratory:  No respiratory distress. No labored breathing. Lungs clear without rales, rhonchi or wheezing. Cardiovascular: Tachycardic. No Murmur. No peripheral edema. Extremities warm and good color. Chest:  Abdomen:  Soft, nondistended. Normal bowel sounds. Nontender to palpation all 4 quadrants. Negative rebound, negative guarding. No localized pain on palpation. Rectal:  Gu: Bladder nontender and non distended. No CVA tenderness. Pelvic:  Extremities:  Normal ROM. Nontender to palpation. Atraumatic. Back:  Normal ROM. Generalized pain on palpation diffusely through the mid to low back. Neuro:  Alert and Oriented to person, place, time and situation. Normal LOC. Moves all extremities. Speech fluent. Psych:  Calm and Cooperative. Normal thought process. Normal judgement.     Lab / Imaging Results   (All laboratory and radiology results have been personally reviewed by myself)  Labs:  Results for orders placed or performed during the hospital encounter of 11/26/22   COVID-19 & Influenza Combo    Specimen: Nasopharyngeal Swab   Result Value Ref Range    SARS-CoV-2 RNA, RT PCR NOT DETECTED NOT DETECTED    INFLUENZA A NOT DETECTED NOT DETECTED    INFLUENZA B NOT DETECTED NOT DETECTED   CBC with Auto Differential   Result Value Ref Range    WBC 7.6 4.5 - 11.5 E9/L    RBC 4.70 3.50 - 5.50 E12/L    Hemoglobin 12.4 11.5 - 15.5 g/dL    Hematocrit 36.7 34.0 - 48.0 %    MCV 78.1 (L) 80.0 - 99.9 fL    MCH 26.4 26.0 - 35.0 pg    MCHC 33.8 32.0 - 34.5 %    RDW 14.4 11.5 - 15.0 fL    Platelets 807 071 - 013 E9/L    MPV 10.8 7.0 - 12.0 fL    Neutrophils % 70.0 43.0 - 80.0 %    Immature Granulocytes % 0.3 0.0 - 5.0 %    Lymphocytes % 25.4 20.0 - 42.0 %    Monocytes % 3.6 2.0 - 12.0 %    Eosinophils % 0.3 0.0 - 6.0 %    Basophils % 0.4 0.0 - 2.0 %    Neutrophils Absolute 5.29 1.80 - 7.30 E9/L    Immature Granulocytes # 0.02 E9/L    Lymphocytes Absolute 1.92 1.50 - 4.00 E9/L    Monocytes Absolute 0.27 0.10 - 0.95 E9/L    Eosinophils Absolute 0.02 (L) 0.05 - 0.50 E9/L    Basophils Absolute 0.03 0.00 - 0.20 E9/L   CMP   Result Value Ref Range    Sodium 139 132 - 146 mmol/L    Potassium 3.7 3.5 - 5.0 mmol/L    Chloride 100 98 - 107 mmol/L    CO2 22 22 - 29 mmol/L    Anion Gap 17 (H) 7 - 16 mmol/L    Glucose 224 (H) 74 - 99 mg/dL    BUN 8 6 - 20 mg/dL    Creatinine 0.6 0.5 - 1.0 mg/dL    Est, Glom Filt Rate >60 >=60 mL/min/1.73    Calcium 9.9 8.6 - 10.2 mg/dL    Total Protein 8.1 6.4 - 8.3 g/dL    Albumin 4.2 3.5 - 5.2 g/dL    Total Bilirubin 0.5 0.0 - 1.2 mg/dL    Alkaline Phosphatase 68 35 - 104 U/L    ALT 14 0 - 32 U/L    AST 23 0 - 31 U/L   Lactic Acid   Result Value Ref Range    Lactic Acid 1.5 0.5 - 2.2 mmol/L   Lipase   Result Value Ref Range    Lipase 11 (L) 13 - 60 U/L   Urinalysis   Result Value Ref Range    Color, UA Yellow Straw/Yellow Clarity, UA Clear Clear    Glucose, Ur 500 (A) Negative mg/dL    Bilirubin Urine Negative Negative    Ketones, Urine 40 (A) Negative mg/dL    Specific Gravity, UA 1.020 1.005 - 1.030    Blood, Urine TRACE-INTACT Negative    pH, UA 7.0 5.0 - 9.0    Protein, UA TRACE Negative mg/dL    Urobilinogen, Urine 0.2 <2.0 E.U./dL    Nitrite, Urine Negative Negative    Leukocyte Esterase, Urine Negative Negative   Beta-Hydroxybutyrate   Result Value Ref Range    Beta-Hydroxybutyrate 2.41 (H) 0.02 - 0.27 mmol/L   PH, VENOUS   Result Value Ref Range    pH, Christoph 7.47 (H) 7.35 - 7.45   Microscopic Urinalysis   Result Value Ref Range    WBC, UA 1-3 0 - 5 /HPF    RBC, UA 1-3 0 - 2 /HPF    Epithelial Cells, UA FEW /HPF    Bacteria, UA FEW (A) None Seen /HPF   Pregnancy, urine   Result Value Ref Range    HCG(Urine) Pregnancy Test NEGATIVE NEGATIVE     Imaging: All Radiology results interpreted by Radiologist unless otherwise noted. CT ABDOMEN PELVIS W IV CONTRAST Additional Contrast? None   Final Result   Circumferential bladder wall thickening, which could suggest cystitis. Clinical and laboratory correlation recommended. ED Course / Medical Decision Making     Medications   0.9 % sodium chloride bolus (0 mLs IntraVENous Stopped 11/26/22 2127)   ondansetron (ZOFRAN) injection 4 mg (4 mg IntraVENous Given 11/26/22 2104)   ketorolac (TORADOL) injection 30 mg (30 mg IntraVENous Given 11/26/22 2103)   metoclopramide (REGLAN) injection 10 mg (10 mg IntraVENous Given 11/26/22 2253)   iopamidol (ISOVUE-370) 76 % injection 50 mL (50 mLs IntraVENous Given 11/26/22 2320)        Re-examination:  11/26/22       Time: 2240  Patients condition remains unchanged. Notified me that patient is still complaining of pain is nauseated and actively vomiting in the room. Additional medication ordered. Consult(s):  Case discussed with MD/DO and/or JACKY. Presenting complaint and pertinent exam findings were relayed.   Current work-up and results and pending tests discussed. All questions answered. Care transitioned to Dr Leonardo Farrell. Procedure(s):   none    MDM:       Plan of Care/Counseling:  EM Attending Physician reviewed today's visit with the patient in addition to providing specific details for the plan of care and counseling regarding the diagnosis and prognosis. Questions are answered at this time and are agreeable with the plan. Patient seen and examined with PA. Patient is a 70-year-old woman, presents with multiple symptoms. Diagnostic studies were obtained, no evidence of DKA or glycemic emergency. Patient was treated with parenteral fluids and medications. Patient states that symptoms have improved and resolved. Requesting discharge. Discharged in stable condition. ASSESSMENT     1. Generalized abdominal pain    2. Nausea and vomiting, unspecified vomiting type      PLAN   Discharged home. Patient condition is fair    New Medications     Discharge Medication List as of 11/27/2022  1:10 AM        Electronically signed by Gustavo Aldana DO   DD: 11/26/22  **This report was transcribed using voice recognition software. Every effort was made to ensure accuracy; however, inadvertent computerized transcription errors may be present.   END OF ED PROVIDER NOTE       Gustavo Aldana DO  11/27/22 [id number]

## 2022-11-30 ENCOUNTER — APPOINTMENT (OUTPATIENT)
Dept: ULTRASOUND IMAGING | Age: 27
End: 2022-11-30
Payer: COMMERCIAL

## 2022-11-30 ENCOUNTER — HOSPITAL ENCOUNTER (EMERGENCY)
Age: 27
Discharge: HOME | End: 2022-11-30
Payer: COMMERCIAL

## 2022-11-30 VITALS
HEIGHT: 61 IN | TEMPERATURE: 97.8 F | WEIGHT: 118 LBS | DIASTOLIC BLOOD PRESSURE: 93 MMHG | SYSTOLIC BLOOD PRESSURE: 117 MMHG | OXYGEN SATURATION: 100 % | HEART RATE: 111 BPM | BODY MASS INDEX: 22.28 KG/M2

## 2022-11-30 PROCEDURE — 76830 TRANSVAGINAL US NON-OB: CPT

## 2022-11-30 PROCEDURE — 93975 VASCULAR STUDY: CPT

## 2022-11-30 PROCEDURE — 99284 EMERGENCY DEPT VISIT MOD MDM: CPT

## 2022-11-30 ASSESSMENT — PAIN SCALES - GENERAL: PAINLEVEL_OUTOF10: 8

## 2022-11-30 ASSESSMENT — LIFESTYLE VARIABLES
HOW MANY STANDARD DRINKS CONTAINING ALCOHOL DO YOU HAVE ON A TYPICAL DAY: PATIENT DOES NOT DRINK
HOW OFTEN DO YOU HAVE A DRINK CONTAINING ALCOHOL: NEVER

## 2022-11-30 ASSESSMENT — PAIN DESCRIPTION - LOCATION: LOCATION: ABDOMEN

## 2022-11-30 ASSESSMENT — PAIN - FUNCTIONAL ASSESSMENT: PAIN_FUNCTIONAL_ASSESSMENT: 0-10

## 2022-11-30 NOTE — ED NOTES
Department of Emergency Medicine  FIRST PROVIDER TRIAGE NOTE             In[facility]           22  4:49 PM EST    Date of Encounter: 22   MRN: [id number]      HPI: Marissa Sherwood is a 24 y.o. female who presents to the ED for Abdominal Pain (Began a week ago after . Seen here but still not better.), Emesis, and Dizziness   Abdominal pain starting 1 week ago shortly after having an . Was evaluated at [facility] however reports symptoms are still present. ROS: Negative for cp or sob. PE: Gen Appearance/Constitutional: alert  Musculoskeletal: moves all extremities x 4     Initial Plan of Care: All treatment areas with department are currently occupied. Plan to order/Initiate the following while awaiting opening in ED: labs and imaging studies.   Initiate Treatment-Testing, Proceed toTreatment Area When Bed Available for ED Attending/MLP to Continue Care    Electronically signed by KENA Ward   DD: 22       Gina Figueroa, [address]  [id number]

## 2022-12-01 PROCEDURE — 99284 EMERGENCY DEPT VISIT MOD MDM: CPT

## 2022-12-01 PROCEDURE — 96374 THER/PROPH/DIAG INJ IV PUSH: CPT

## 2022-12-01 PROCEDURE — 96375 TX/PRO/DX INJ NEW DRUG ADDON: CPT

## 2022-12-01 ASSESSMENT — PAIN - FUNCTIONAL ASSESSMENT: PAIN_FUNCTIONAL_ASSESSMENT: NONE - DENIES PAIN

## 2022-12-02 ENCOUNTER — HOSPITAL ENCOUNTER (EMERGENCY)
Age: 27
Discharge: HOME | End: 2022-12-02
Attending: EMERGENCY MEDICINE
Payer: COMMERCIAL

## 2022-12-02 VITALS
HEART RATE: 89 BPM | RESPIRATION RATE: 14 BRPM | SYSTOLIC BLOOD PRESSURE: 132 MMHG | DIASTOLIC BLOOD PRESSURE: 86 MMHG | WEIGHT: 120 LBS | BODY MASS INDEX: 22.66 KG/M2 | HEIGHT: 61 IN | OXYGEN SATURATION: 98 % | TEMPERATURE: 98.6 F

## 2022-12-02 DIAGNOSIS — R10.84 GENERALIZED ABDOMINAL PAIN: Primary | ICD-10-CM

## 2022-12-02 DIAGNOSIS — Z86.39 HX OF TYPE 1 DIABETES MELLITUS: ICD-10-CM

## 2022-12-02 LAB
ACETAMINOPHEN LEVEL: 5.1 MCG/ML (ref 10–30)
ALBUMIN SERPL-MCNC: 4 G/DL (ref 3.5–5.2)
ALP BLD-CCNC: 60 U/L (ref 35–104)
ALT SERPL-CCNC: 14 U/L (ref 0–32)
AMPHETAMINE SCREEN, URINE: NOT DETECTED
ANION GAP SERPL CALCULATED.3IONS-SCNC: 13 MMOL/L (ref 7–16)
AST SERPL-CCNC: 26 U/L (ref 0–31)
BACTERIA: ABNORMAL /HPF
BARBITURATE SCREEN URINE: NOT DETECTED
BASOPHILS ABSOLUTE: 0.05 E9/L (ref 0–0.2)
BASOPHILS RELATIVE PERCENT: 0.7 % (ref 0–2)
BENZODIAZEPINE SCREEN, URINE: NOT DETECTED
BETA-HYDROXYBUTYRATE: 2.74 MMOL/L (ref 0.02–0.27)
BILIRUB SERPL-MCNC: 0.3 MG/DL (ref 0–1.2)
BILIRUBIN DIRECT: <0.2 MG/DL (ref 0–0.3)
BILIRUBIN URINE: NEGATIVE
BILIRUBIN, INDIRECT: NORMAL MG/DL (ref 0–1)
BLOOD, URINE: ABNORMAL
BUN BLDV-MCNC: 7 MG/DL (ref 6–20)
CALCIUM SERPL-MCNC: 9.2 MG/DL (ref 8.6–10.2)
CANNABINOID SCREEN URINE: POSITIVE
CHLORIDE BLD-SCNC: 100 MMOL/L (ref 98–107)
CLARITY: CLEAR
CO2: 23 MMOL/L (ref 22–29)
COCAINE METABOLITE SCREEN URINE: NOT DETECTED
COLOR: YELLOW
CREAT SERPL-MCNC: 0.5 MG/DL (ref 0.5–1)
EOSINOPHILS ABSOLUTE: 0.05 E9/L (ref 0.05–0.5)
EOSINOPHILS RELATIVE PERCENT: 0.7 % (ref 0–6)
EPITHELIAL CELLS, UA: ABNORMAL /HPF
ETHANOL: <10 MG/DL (ref 0–0.08)
FENTANYL SCREEN, URINE: NOT DETECTED
GFR SERPL CREATININE-BSD FRML MDRD: >60 ML/MIN/1.73
GLUCOSE BLD-MCNC: 99 MG/DL (ref 74–99)
GLUCOSE URINE: NEGATIVE MG/DL
HCG, URINE, POC: NEGATIVE
HCT VFR BLD CALC: 36.1 % (ref 34–48)
HEMOGLOBIN: 12.2 G/DL (ref 11.5–15.5)
IMMATURE GRANULOCYTES #: 0.01 E9/L
IMMATURE GRANULOCYTES %: 0.1 % (ref 0–5)
KETONES, URINE: 40 MG/DL
LEUKOCYTE ESTERASE, URINE: NEGATIVE
LYMPHOCYTES ABSOLUTE: 2.6 E9/L (ref 1.5–4)
LYMPHOCYTES RELATIVE PERCENT: 36.5 % (ref 20–42)
Lab: ABNORMAL
Lab: NORMAL
MCH RBC QN AUTO: 26.2 PG (ref 26–35)
MCHC RBC AUTO-ENTMCNC: 33.8 % (ref 32–34.5)
MCV RBC AUTO: 77.5 FL (ref 80–99.9)
METHADONE SCREEN, URINE: NOT DETECTED
MONOCYTES ABSOLUTE: 0.49 E9/L (ref 0.1–0.95)
MONOCYTES RELATIVE PERCENT: 6.9 % (ref 2–12)
MUCUS: PRESENT /LPF
NEGATIVE QC PASS/FAIL: NORMAL
NEUTROPHILS ABSOLUTE: 3.93 E9/L (ref 1.8–7.3)
NEUTROPHILS RELATIVE PERCENT: 55.1 % (ref 43–80)
NITRITE, URINE: NEGATIVE
OPIATE SCREEN URINE: NOT DETECTED
OXYCODONE URINE: NOT DETECTED
PDW BLD-RTO: 14.9 FL (ref 11.5–15)
PH UA: 6 (ref 5–9)
PH VENOUS: 7.41 (ref 7.35–7.45)
PHENCYCLIDINE SCREEN URINE: NOT DETECTED
PLATELET # BLD: 259 E9/L (ref 130–450)
PMV BLD AUTO: 10.5 FL (ref 7–12)
POSITIVE QC PASS/FAIL: NORMAL
POTASSIUM SERPL-SCNC: 3.5 MMOL/L (ref 3.5–5)
PROTEIN UA: 100 MG/DL
RBC # BLD: 4.66 E12/L (ref 3.5–5.5)
RBC UA: ABNORMAL /HPF (ref 0–2)
SALICYLATE, SERUM: <0.3 MG/DL (ref 0–30)
SODIUM BLD-SCNC: 136 MMOL/L (ref 132–146)
SPECIFIC GRAVITY UA: 1.02 (ref 1–1.03)
TOTAL PROTEIN: 7.2 G/DL (ref 6.4–8.3)
TRICYCLIC ANTIDEPRESSANTS SCREEN SERUM: NEGATIVE NG/ML
UROBILINOGEN, URINE: 0.2 E.U./DL
WBC # BLD: 7.1 E9/L (ref 4.5–11.5)
WBC UA: ABNORMAL /HPF (ref 0–5)

## 2022-12-02 PROCEDURE — 96375 TX/PRO/DX INJ NEW DRUG ADDON: CPT

## 2022-12-02 PROCEDURE — 96374 THER/PROPH/DIAG INJ IV PUSH: CPT

## 2022-12-02 PROCEDURE — 80143 DRUG ASSAY ACETAMINOPHEN: CPT

## 2022-12-02 PROCEDURE — 82077 ASSAY SPEC XCP UR&BREATH IA: CPT

## 2022-12-02 PROCEDURE — 80179 DRUG ASSAY SALICYLATE: CPT

## 2022-12-02 PROCEDURE — 80048 BASIC METABOLIC PNL TOTAL CA: CPT

## 2022-12-02 PROCEDURE — 81001 URINALYSIS AUTO W/SCOPE: CPT

## 2022-12-02 PROCEDURE — 82010 KETONE BODYS QUAN: CPT

## 2022-12-02 PROCEDURE — 36415 COLL VENOUS BLD VENIPUNCTURE: CPT

## 2022-12-02 PROCEDURE — 80307 DRUG TEST PRSMV CHEM ANLYZR: CPT

## 2022-12-02 PROCEDURE — 6360000002 HC RX W HCPCS: Performed by: EMERGENCY MEDICINE

## 2022-12-02 PROCEDURE — 82800 BLOOD PH: CPT

## 2022-12-02 PROCEDURE — 80076 HEPATIC FUNCTION PANEL: CPT

## 2022-12-02 PROCEDURE — 2580000003 HC RX 258: Performed by: EMERGENCY MEDICINE

## 2022-12-02 PROCEDURE — 85025 COMPLETE CBC W/AUTO DIFF WBC: CPT

## 2022-12-02 RX ORDER — FENTANYL CITRATE 50 UG/ML
25 INJECTION, SOLUTION INTRAMUSCULAR; INTRAVENOUS ONCE
Status: COMPLETED | OUTPATIENT
Start: 2022-12-02 | End: 2022-12-02

## 2022-12-02 RX ORDER — METOCLOPRAMIDE HYDROCHLORIDE 5 MG/ML
10 INJECTION INTRAMUSCULAR; INTRAVENOUS ONCE
Status: COMPLETED | OUTPATIENT
Start: 2022-12-02 | End: 2022-12-02

## 2022-12-02 RX ORDER — 0.9 % SODIUM CHLORIDE 0.9 %
1000 INTRAVENOUS SOLUTION INTRAVENOUS ONCE
Status: COMPLETED | OUTPATIENT
Start: 2022-12-02 | End: 2022-12-02

## 2022-12-02 RX ORDER — METOCLOPRAMIDE 10 MG/1
10 TABLET ORAL 4 TIMES DAILY
Qty: 20 TABLET | Refills: 0 | Status: SHIPPED | OUTPATIENT
Start: 2022-12-02 | End: 2022-12-07

## 2022-12-02 RX ADMIN — METOCLOPRAMIDE 10 MG: 5 INJECTION, SOLUTION INTRAMUSCULAR; INTRAVENOUS at 03:48

## 2022-12-02 RX ADMIN — FENTANYL CITRATE 25 MCG: 50 INJECTION INTRAMUSCULAR; INTRAVENOUS at 01:47

## 2022-12-02 RX ADMIN — SODIUM CHLORIDE 1000 ML: 9 INJECTION, SOLUTION INTRAVENOUS at 01:49

## 2022-12-02 ASSESSMENT — ENCOUNTER SYMPTOMS
COUGH: 0
SINUS PRESSURE: 0
CONSTIPATION: 0
SHORTNESS OF BREATH: 0
BACK PAIN: 0
EYE REDNESS: 0
ABDOMINAL PAIN: 1
NAUSEA: 0
BELCHING: 0
WHEEZING: 0
EYE DISCHARGE: 0
VOMITING: 0
SORE THROAT: 0
FLATUS: 0
DIARRHEA: 0
ABDOMINAL DISTENTION: 0
EYE PAIN: 0

## 2022-12-02 ASSESSMENT — PAIN DESCRIPTION - LOCATION
LOCATION: ABDOMEN
LOCATION: ABDOMEN

## 2022-12-02 ASSESSMENT — PAIN SCALES - GENERAL
PAINLEVEL_OUTOF10: 3
PAINLEVEL_OUTOF10: 9

## 2022-12-02 ASSESSMENT — PAIN DESCRIPTION - DESCRIPTORS: DESCRIPTORS: ACHING

## 2022-12-02 NOTE — ED PROVIDER NOTES
81-year-old female history of poorly controlled type 1 diabetes has been admitted multiple times in the past year for diabetic ketoacidosis presents to the emergency department with lower abdominal pain. She states the symptoms been ongoing for the last several days and she has been to the emergency department several times since Thanksgiving for similar complaints without answers. Patient states no fevers chills chest pain shortness of breath cough urinary symptoms leg swelling hyperglycemia or other complaints. She states her sugars have been in the 200s. The history is provided by the patient. Abdominal Pain  Pain location:  Suprapubic  Pain quality: aching    Pain radiates to:  Does not radiate  Pain severity:  Moderate  Onset quality:  Gradual  Duration:  1 week  Timing:  Intermittent  Progression:  Waxing and waning  Chronicity:  New  Relieved by:  Nothing  Worsened by:  Nothing  Ineffective treatments:  None tried  Associated symptoms: no anorexia, no belching, no chest pain, no chills, no constipation, no cough, no diarrhea, no dysuria, no fatigue, no fever, no flatus, no nausea, no shortness of breath, no sore throat and no vomiting       Review of Systems   Constitutional:  Negative for chills, fatigue and fever. HENT:  Negative for ear pain, sinus pressure and sore throat. Eyes:  Negative for pain, discharge and redness. Respiratory:  Negative for cough, shortness of breath and wheezing. Cardiovascular:  Negative for chest pain. Gastrointestinal:  Positive for abdominal pain. Negative for abdominal distention, anorexia, constipation, diarrhea, flatus, nausea and vomiting. Genitourinary:  Negative for dysuria and frequency. Musculoskeletal:  Negative for arthralgias and back pain. Skin:  Negative for rash and wound. Neurological:  Negative for weakness and headaches. Hematological:  Negative for adenopathy. All other systems reviewed and are negative.      Physical Exam  HENT:      Head: Normocephalic and atraumatic. Cardiovascular:      Rate and Rhythm: Normal rate and regular rhythm. Heart sounds: Normal heart sounds. Pulmonary:      Effort: Pulmonary effort is normal.      Breath sounds: Normal breath sounds. Abdominal:      General: Abdomen is flat. Bowel sounds are normal.      Palpations: Abdomen is soft. Tenderness: There is abdominal tenderness in the suprapubic area. Skin:     General: Skin is warm. Capillary Refill: Capillary refill takes less than 2 seconds. Neurological:      General: No focal deficit present. Mental Status: She is alert and oriented to person, place, and time. Procedures     MDM  Number of Diagnoses or Management Options  Generalized abdominal pain  Hx of type 1 diabetes mellitus  Diagnosis management comments: Patient was seen and examined. Labs were ordered labs were found to be reassuring blood sugar was reassuring. After general lab work had long discussion with patient I suspect symptoms are related to gastroparesis given the reassuring lab work and reassuring vital signs patient is not in DKA today does not feel further work-up including CT scan is warranted as she had recent ultrasound yesterday and CT scan a few days prior to that. She had no change in her white count. I discussed all results with patient she is comfortable plan of discharge to follow-up with primary care physician and has a appoint with her endocrinologist in 5 days. --------------------------------------------- PAST HISTORY ---------------------------------------------  Past Medical History:  has a past medical history of Bleeding at insertion site, Common femoral artery injury, right, initial encounter, Depressive disorder, Diabetic ketoacidosis ([facility] [address] DM type 1 (diabetes mellitus, type 1) ([facility] [address] Marijuana abuse, Non-compliance, and Trichimoniasis.     Past Surgical History:  has a past surgical history that includes Abdomen surgery (N/A, 5/9/2019) and Bartholin gland cyst excision. Social History:  reports that she has never smoked. She has never used smokeless tobacco. She reports current drug use. Drug: Marijuana [employer]). She reports that she does not drink alcohol. Family History: family history includes Diabetes in her maternal grandmother and paternal grandmother; Stroke in an other family member. The patients home medications have been reviewed. Allergies: Patient has no known allergies.     -------------------------------------------------- RESULTS -------------------------------------------------  Labs:  Results for orders placed or performed during the hospital encounter of 12/02/22   CBC with Auto Differential   Result Value Ref Range    WBC 7.1 4.5 - 11.5 E9/L    RBC 4.66 3.50 - 5.50 E12/L    Hemoglobin 12.2 11.5 - 15.5 g/dL    Hematocrit 36.1 34.0 - 48.0 %    MCV 77.5 (L) 80.0 - 99.9 fL    MCH 26.2 26.0 - 35.0 pg    MCHC 33.8 32.0 - 34.5 %    RDW 14.9 11.5 - 15.0 fL    Platelets 870 627 - 710 E9/L    MPV 10.5 7.0 - 12.0 fL    Neutrophils % 55.1 43.0 - 80.0 %    Immature Granulocytes % 0.1 0.0 - 5.0 %    Lymphocytes % 36.5 20.0 - 42.0 %    Monocytes % 6.9 2.0 - 12.0 %    Eosinophils % 0.7 0.0 - 6.0 %    Basophils % 0.7 0.0 - 2.0 %    Neutrophils Absolute 3.93 1.80 - 7.30 E9/L    Immature Granulocytes # 0.01 E9/L    Lymphocytes Absolute 2.60 1.50 - 4.00 E9/L    Monocytes Absolute 0.49 0.10 - 0.95 E9/L    Eosinophils Absolute 0.05 0.05 - 0.50 E9/L    Basophils Absolute 0.05 0.00 - 0.20 W4/S   Basic Metabolic Panel   Result Value Ref Range    Sodium 136 132 - 146 mmol/L    Potassium 3.5 3.5 - 5.0 mmol/L    Chloride 100 98 - 107 mmol/L    CO2 23 22 - 29 mmol/L    Anion Gap 13 7 - 16 mmol/L    Glucose 99 74 - 99 mg/dL    BUN 7 6 - 20 mg/dL    Creatinine 0.5 0.5 - 1.0 mg/dL    Est, Glom Filt Rate >60 >=60 mL/min/1.73    Calcium 9.2 8.6 - 10.2 mg/dL   Hepatic Function Panel   Result Value Ref Range    Total Protein 7.2 6.4 - 8.3 g/dL    Albumin 4.0 3.5 - 5.2 g/dL    Alkaline Phosphatase 60 35 - 104 U/L    ALT 14 0 - 32 U/L    AST 26 0 - 31 U/L    Total Bilirubin 0.3 0.0 - 1.2 mg/dL   Beta-Hydroxybutyrate   Result Value Ref Range    Beta-Hydroxybutyrate 2.74 (H) 0.02 - 0.27 mmol/L   pH, venous   Result Value Ref Range    pH, Christoph 7.41 7.35 - 7.45   Urinalysis   Result Value Ref Range    Color, UA Yellow Straw/Yellow    Clarity, UA Clear Clear    Glucose, Ur Negative Negative mg/dL    Bilirubin Urine Negative Negative    Ketones, Urine 40 (A) Negative mg/dL    Specific Gravity, UA 1.025 1.005 - 1.030    Blood, Urine SMALL (A) Negative    pH, UA 6.0 5.0 - 9.0    Protein,  (A) Negative mg/dL    Urobilinogen, Urine 0.2 <2.0 E.U./dL    Nitrite, Urine Negative Negative    Leukocyte Esterase, Urine Negative Negative   Serum Drug Screen   Result Value Ref Range    Ethanol Lvl <10 mg/dL    Acetaminophen Level 5.1 (L) 10.0 - 45.6 mcg/mL    Salicylate, Serum <0.7 0.0 - 30.0 mg/dL   Urine Drug Screen   Result Value Ref Range    Amphetamine Screen, Urine NOT DETECTED Negative <1000 ng/mL    Barbiturate Screen, Ur NOT DETECTED Negative < 200 ng/mL    Benzodiazepine Screen, Urine NOT DETECTED Negative < 200 ng/mL    Cannabinoid Scrn, Ur POSITIVE (A) Negative < 50ng/mL    Cocaine Metabolite Screen, Urine NOT DETECTED Negative < 300 ng/mL    Opiate Scrn, Ur NOT DETECTED Negative < 300ng/mL    PCP Screen, Urine NOT DETECTED Negative < 25 ng/mL    Methadone Screen, Urine NOT DETECTED Negative <300 ng/mL    Oxycodone Urine NOT DETECTED Negative <100 ng/mL    FENTANYL SCREEN, URINE NOT DETECTED Negative <1 ng/mL    Drug Screen Comment: see below    Microscopic Urinalysis   Result Value Ref Range    Mucus, UA Present (A) None Seen /LPF    WBC, UA 0-1 0 - 5 /HPF    RBC, UA 0-1 0 - 2 /HPF    Epithelial Cells, UA MODERATE /HPF    Bacteria, UA RARE (A) None Seen /HPF   POC Pregnancy Urine Qual   Result Value Ref Range    HCG, Urine, POC Negative Negative    Lot Number [id number]     Positive QC Pass/Fail Pass     Negative QC Pass/Fail Pass        Radiology:  No orders to display       ------------------------- NURSING NOTES AND VITALS REVIEWED ---------------------------  Date / Time Roomed:  12/2/2022 12:46 AM  ED Bed Assignment:  07/07    The nursing notes within the ED encounter and vital signs as below have been reviewed. BP (!) 134/92   Pulse 97   Temp 98.6 °F (37 °C) (Oral)   Resp 16   Ht 5' 1\" (1.549 m)   Wt 120 lb (54.4 kg)   LMP  (LMP Unknown)   SpO2 98%   BMI 22.67 kg/m²   Oxygen Saturation Interpretation: Normal      ------------------------------------------ PROGRESS NOTES ------------------------------------------    I have spoken with the patient and discussed todays results, in addition to providing specific details for the plan of care and counseling regarding the diagnosis and prognosis. Their questions are answered at this time and they are agreeable with the plan. I discussed at length with them reasons for immediate return here for re evaluation. They will followup with their primary care physician by calling their office on Monday.      --------------------------------- ADDITIONAL PROVIDER NOTES ---------------------------------  At this time the patient is without objective evidence of an acute process requiring hospitalization or inpatient management. They have remained hemodynamically stable throughout their entire ED visit and are stable for discharge with outpatient follow-up. The plan has been discussed in detail and they are aware of the specific conditions for emergent return, as well as the importance of follow-up. New Prescriptions    METOCLOPRAMIDE (REGLAN) 10 MG TABLET    Take 1 tablet by mouth 4 times daily for 5 days       Diagnosis:  1. Generalized abdominal pain    2.  Hx of type 1 diabetes mellitus        Disposition:  Patient's disposition: Discharge to home  Patient's condition is stable.        Kayli Montalvo, DO  12/02/22 2207

## 2022-12-07 ENCOUNTER — OFFICE VISIT (OUTPATIENT)
Dept: ENDOCRINOLOGY | Age: 27
End: 2022-12-07
Payer: COMMERCIAL

## 2022-12-07 VITALS
HEIGHT: 61 IN | HEART RATE: 102 BPM | SYSTOLIC BLOOD PRESSURE: 146 MMHG | RESPIRATION RATE: 16 BRPM | WEIGHT: 118 LBS | BODY MASS INDEX: 22.28 KG/M2 | DIASTOLIC BLOOD PRESSURE: 107 MMHG

## 2022-12-07 DIAGNOSIS — Z91.119 DIETARY NONCOMPLIANCE: ICD-10-CM

## 2022-12-07 DIAGNOSIS — E55.9 VITAMIN D DEFICIENCY: ICD-10-CM

## 2022-12-07 DIAGNOSIS — E10.65 TYPE 1 DIABETES MELLITUS WITH HYPERGLYCEMIA (HCC): Primary | ICD-10-CM

## 2022-12-07 PROCEDURE — G8420 CALC BMI NORM PARAMETERS: HCPCS | Performed by: INTERNAL MEDICINE

## 2022-12-07 PROCEDURE — 1036F TOBACCO NON-USER: CPT | Performed by: INTERNAL MEDICINE

## 2022-12-07 PROCEDURE — 99214 OFFICE O/P EST MOD 30 MIN: CPT | Performed by: INTERNAL MEDICINE

## 2022-12-07 PROCEDURE — 3046F HEMOGLOBIN A1C LEVEL >9.0%: CPT | Performed by: INTERNAL MEDICINE

## 2022-12-07 PROCEDURE — G8484 FLU IMMUNIZE NO ADMIN: HCPCS | Performed by: INTERNAL MEDICINE

## 2022-12-07 PROCEDURE — G8427 DOCREV CUR MEDS BY ELIG CLIN: HCPCS | Performed by: INTERNAL MEDICINE

## 2022-12-07 PROCEDURE — 2022F DILAT RTA XM EVC RTNOPTHY: CPT | Performed by: INTERNAL MEDICINE

## 2022-12-07 RX ORDER — GABAPENTIN 100 MG/1
100 CAPSULE ORAL NIGHTLY
Qty: 90 CAPSULE | Refills: 2
Start: 2022-12-07 | End: 2023-04-06

## 2022-12-07 RX ORDER — GLUCOSAMINE HCL/CHONDROITIN SU 500-400 MG
CAPSULE ORAL
Qty: 250 STRIP | Refills: 5 | Status: SHIPPED | OUTPATIENT
Start: 2022-12-07

## 2022-12-07 NOTE — PROGRESS NOTES
[address] [facility] Department of Endocrinology Diabetes and Metabolism   [address] [facility] [address]   Phone: [phone]  Fax: [phone]    Date of Service: 12/7/2022  Primary Care Physician: Nisha Palacio DO. Provider: Fe Mancilla MD    Reason for the visit:  Type 1 DM, vitD deficiency      History of Present Illness: The history is provided by the patient. No  was used. Accuracy of the patient data is excellent. Vanessa Luke is a very pleasant 24 y.o. female seen today for follow up visit     Vanessa Luke was diagnosed with diabetes at age 8   Has insulin pump at home but she doesn't like it    She is currently on Lantus 22 units at night, humalog 1:10 + ss 1:50>150  Pt is poorly compliant with diet and insulin therapy   The patient has been checking BS  Multiple times a day and readings are better since recent hospitalization  She didn't send us sugar log today   Lab Results   Component Value Date/Time    LABA1C 13.3 10/03/2022 05:16 AM    LABA1C 14.6 08/16/2022 05:20 AM    LABA1C 14.5 06/06/2022 03:05 AM    LABA1C 15.6 04/25/2022 12:20 AM     Her diabetes complicated with gastroparesis   I reviewed current medications and the patient has no issues with diabetes medications  Vanessa Luke is due for eye exam   The patient performs her own feet care and doesn't see podiatry service   Microvascular complications: + Neuropathy. No Retinopathy, No Nephropathy   Macrovascular complications: no CAD, PVD, or Stroke  The patient receives Flushot every year. She has not received the pneumonia vaccine.     PAST MEDICAL HISTORY   Past Medical History:   Diagnosis Date    Bleeding at insertion site 01/05/2018    Common femoral artery injury, right, initial encounter 01/05/2018    Depressive disorder     Diabetic ketoacidosis ([address]     DM type 1 (diabetes mellitus, type 1) ([address]     Marijuana abuse     Non-compliance     Trichimoniasis 11/19/2021 PAST SURGICAL HISTORY   Past Surgical History:   Procedure Laterality Date    ABDOMEN SURGERY N/A 5/9/2019    INCISION AND DRAINAGE OF SUPRAPUBIC ABSESS performed by Dilcia Tabor MD at [address] [place] [address]      last yr     SOCIAL HISTORY   Tobacco:   reports that she has never smoked. She has never used smokeless tobacco.  Alcol:   reports no history of alcohol use. Illicit Drugs:   reports current drug use. Drug: Marijuana [place]). FAMILY HISTORY   Family History   Problem Relation Age of Onset    Diabetes Maternal Grandmother     Diabetes Paternal Grandmother     Stroke Other     Thyroid Disease Neg Hx      ALLERGIES AND DRUG REACTIONS   No Known Allergies    CURRENT MEDICATIONS     Current Outpatient Medications   Medication Sig Dispense Refill    metoclopramide (REGLAN) 10 MG tablet Take 1 tablet by mouth 4 times daily for 5 days 20 tablet 0    insulin glargine (LANTUS SOLOSTAR) 100 UNIT/ML injection pen Inject 22 Units into the skin every morning 10 Adjustable Dose Pre-filled Pen Syringe 5    insulin aspart (NOVOLOG FLEXPEN) 100 UNIT/ML injection pen Inject 6 units with meals + following sliding scale. -200 add 2U, -250 add 4U, -300 add 6U, -350 add 8U, -400 add 10U, BS over 400 add 12U. MA 30u/day 10 Adjustable Dose Pre-filled Pen Syringe 3    glucose 4g chewable tablet Take 4 tablets by mouth as needed for Low blood sugar 60 tablet 3    Insulin Pen Needle (BD PEN NEEDLE SHELBIE U/F) 32G X 4 MM MISC Uses with insulin 4 times a day 250 each 5     No current facility-administered medications for this visit. Review of Systems  Constitutional: No fever, no chills, no diaphoresis, no generalized weakness. HEENT: No blurred vision, No sore throat, no ear pain, no hair loss  Neck: denied any neck swelling, difficulty swallowing,   Cardio-pulmonary: No CP, SOB or palpitation, No orthopnea or PND. No cough or wheezing.   GI: No N/V/D, no constipation, No abdominal pain, no melena or hematochezia   : Denied any dysuria, hematuria, flank pain, discharge, or incontinence. Skin: denied any rash, ulcer, Hirsute, or hyperpigmentation. MSK: denied any joint deformity, joint pain/swelling, muscle pain, or back pain. Neuro: + numbness and tingling in lower extremities. OBJECTIVE    BP (!) 146/107   Pulse (!) 102   Resp 16   Ht 5' 1\" (1.549 m)   Wt 118 lb (53.5 kg)   LMP  (LMP Unknown)   BMI 22.30 kg/m²   BP Readings from Last 4 Encounters:   12/07/22 (!) 146/107   12/02/22 132/86   11/26/22 (!) 150/97   11/24/22 (!) 140/106     Wt Readings from Last 6 Encounters:   12/07/22 118 lb (53.5 kg)   12/01/22 120 lb (54.4 kg)   11/26/22 113 lb (51.3 kg)   11/24/22 113 lb (51.3 kg)   10/17/22 115 lb (52.2 kg)   10/13/22 114 lb (51.7 kg)       Physical examination:  General: awake alert, oriented x3  HEENT: normocephalic non traumatic, no exophthalmos   Neck: supple, thyroid tenderness,  Pulm: Clear equal air entry no added sounds  CVS: S1 + S2  Abd: soft lax, no tenderness  Skin: warm, no lesions, no rash.  No open wounds, no ulcers   Neuro: CN intact, sensation decreased bilateral , muscle power normal  Psych: normal mood, and affect      Review of Laboratory Data:  I have reviewed the following:  Lab Results   Component Value Date/Time    WBC 7.1 12/02/2022 01:15 AM    RBC 4.66 12/02/2022 01:15 AM    HGB 12.2 12/02/2022 01:15 AM    HCT 36.1 12/02/2022 01:15 AM    MCV 77.5 (L) 12/02/2022 01:15 AM    MCH 26.2 12/02/2022 01:15 AM    MCHC 33.8 12/02/2022 01:15 AM    RDW 14.9 12/02/2022 01:15 AM     12/02/2022 01:15 AM    MPV 10.5 12/02/2022 01:15 AM      Lab Results   Component Value Date/Time     12/02/2022 01:15 AM    K 3.5 12/02/2022 01:15 AM    K 7.4 () 08/15/2022 04:18 PM    CO2 23 12/02/2022 01:15 AM    BUN 7 12/02/2022 01:15 AM    CREATININE 0.5 12/02/2022 01:15 AM    CALCIUM 9.2 12/02/2022 01:15 AM    LABGLOM >60 12/02/2022 01:15 AM    GFRAA >60 10/05/2022 02:39 AM      Lab Results   Component Value Date/Time    TSH 0.489 01/08/2022 12:13 AM     Lab Results   Component Value Date/Time    LABA1C 13.3 10/03/2022 05:16 AM    GLUCOSE 99 12/02/2022 01:15 AM    MALBCR 53.2 08/24/2021 03:09 PM    LABMICR 81.9 08/24/2021 03:09 PM    LABCREA 154 08/24/2021 03:09 PM     Lab Results   Component Value Date/Time    CHOL 244 01/08/2022 12:13 AM    CHOL 175 01/24/2018 04:15 AM    TRIG 337 01/08/2022 12:13 AM    TRIG 106 01/24/2018 04:15 AM    HDL 48 01/08/2022 12:13 AM    HDL 40 01/24/2018 04:15 AM     No results found for: Gayathri Cerda, a 24 y.o.-old female seen in for the following issues     Diabetes Mellitus Type 1    Pt's DM is uncontrolled due to poor compliance with diabetic diet and missing insulin. A1c consistently >10%   Current yon Lantus 22 units at night, humalog 1:10 + ss 2:50>150   Will get her Omnipod insulin pump + DEXCOM CGM   Discussed with patient A1c and blood sugar goals   Patient will need routine diabetes maintenance and prevention    Dietary noncompliance  Discussed with patient the importance of eating consistent carbohydrate meals, avoiding high glycemic index food. Also, discussed with patient the risk and negative consequences of dietary noncompliance on blood glucose control, blood pressure and weight    vitD deficiency   Continue vitD supplement     I personally reviewed external notes from PCP and other patient's care team providers, and personally interpreted labs associated with the above diagnosis. I also ordered labs to further assess and manage the above addressed medical conditions    Return in about 4 weeks (around 1/4/2023) for DM type 1. The above issues were reviewed with the patient who understood and agreed with the plan. Thank you for allowing us to participate in the care of this patient. Please do not hesitate to contact us with any additional questions.     Diagnosis Orders 1. Type 1 diabetes mellitus with hyperglycemia (HCC)  gabapentin (NEURONTIN) 100 MG capsule    blood glucose monitor strips      2. Dietary noncompliance        3. Vitamin D deficiency            Afua Talbot MD  Endocrinologist, [facility])   [address] [facility] [place], [address] [place] [address],[address] [id number]   Phone: [phone]  Fax: [phone]  -------------------------   An electronic signature was used to authenticate this note.  Carolyn Khoury MD on 12/7/2022 at 2:13 PM

## 2022-12-08 ENCOUNTER — APPOINTMENT (OUTPATIENT)
Dept: ULTRASOUND IMAGING | Age: 27
End: 2022-12-08
Payer: COMMERCIAL

## 2022-12-08 ENCOUNTER — APPOINTMENT (OUTPATIENT)
Dept: CT IMAGING | Age: 27
End: 2022-12-08
Payer: COMMERCIAL

## 2022-12-08 ENCOUNTER — HOSPITAL ENCOUNTER (EMERGENCY)
Age: 27
Discharge: HOME | End: 2022-12-08
Attending: EMERGENCY MEDICINE
Payer: COMMERCIAL

## 2022-12-08 VITALS
OXYGEN SATURATION: 98 % | TEMPERATURE: 98.2 F | RESPIRATION RATE: 17 BRPM | WEIGHT: 118 LBS | HEART RATE: 88 BPM | SYSTOLIC BLOOD PRESSURE: 139 MMHG | DIASTOLIC BLOOD PRESSURE: 71 MMHG | BODY MASS INDEX: 22.3 KG/M2

## 2022-12-08 DIAGNOSIS — N83.201 CYST OF RIGHT OVARY: Primary | ICD-10-CM

## 2022-12-08 DIAGNOSIS — M25.559 PAIN IN JOINT INVOLVING PELVIC REGION AND THIGH, UNSPECIFIED LATERALITY: ICD-10-CM

## 2022-12-08 LAB
ALBUMIN SERPL-MCNC: 3.5 G/DL (ref 3.5–5.2)
ALP BLD-CCNC: 55 U/L (ref 35–104)
ALT SERPL-CCNC: 18 U/L (ref 0–32)
AMORPHOUS: ABNORMAL
ANION GAP SERPL CALCULATED.3IONS-SCNC: 11 MMOL/L (ref 7–16)
AST SERPL-CCNC: 37 U/L (ref 0–31)
BACTERIA: ABNORMAL /HPF
BASOPHILS ABSOLUTE: 0.02 E9/L (ref 0–0.2)
BASOPHILS RELATIVE PERCENT: 0.3 % (ref 0–2)
BETA-HYDROXYBUTYRATE: 0.64 MMOL/L (ref 0.02–0.27)
BILIRUB SERPL-MCNC: 0.2 MG/DL (ref 0–1.2)
BILIRUBIN DIRECT: <0.2 MG/DL (ref 0–0.3)
BILIRUBIN URINE: NEGATIVE
BILIRUBIN, INDIRECT: ABNORMAL MG/DL (ref 0–1)
BLOOD, URINE: NEGATIVE
BUN BLDV-MCNC: 6 MG/DL (ref 6–20)
CALCIUM SERPL-MCNC: 8.6 MG/DL (ref 8.6–10.2)
CHLORIDE BLD-SCNC: 99 MMOL/L (ref 98–107)
CLARITY: ABNORMAL
CO2: 24 MMOL/L (ref 22–29)
COLOR: YELLOW
CREAT SERPL-MCNC: 0.5 MG/DL (ref 0.5–1)
EOSINOPHILS ABSOLUTE: 0 E9/L (ref 0.05–0.5)
EOSINOPHILS RELATIVE PERCENT: 0 % (ref 0–6)
EPITHELIAL CELLS, UA: ABNORMAL /HPF
GFR SERPL CREATININE-BSD FRML MDRD: >60 ML/MIN/1.73
GLUCOSE BLD-MCNC: 328 MG/DL (ref 74–99)
GLUCOSE URINE: >=1000 MG/DL
HCG(URINE) PREGNANCY TEST: NEGATIVE
HCT VFR BLD CALC: 32.8 % (ref 34–48)
HEMOGLOBIN: 11.1 G/DL (ref 11.5–15.5)
IMMATURE GRANULOCYTES #: 0.03 E9/L
IMMATURE GRANULOCYTES %: 0.4 % (ref 0–5)
KETONES, URINE: NEGATIVE MG/DL
LACTIC ACID: 1.5 MMOL/L (ref 0.5–2.2)
LEUKOCYTE ESTERASE, URINE: ABNORMAL
LIPASE: 11 U/L (ref 13–60)
LYMPHOCYTES ABSOLUTE: 0.7 E9/L (ref 1.5–4)
LYMPHOCYTES RELATIVE PERCENT: 9.6 % (ref 20–42)
MCH RBC QN AUTO: 27.4 PG (ref 26–35)
MCHC RBC AUTO-ENTMCNC: 33.8 % (ref 32–34.5)
MCV RBC AUTO: 81 FL (ref 80–99.9)
MONOCYTES ABSOLUTE: 0.59 E9/L (ref 0.1–0.95)
MONOCYTES RELATIVE PERCENT: 8.1 % (ref 2–12)
NEUTROPHILS ABSOLUTE: 5.96 E9/L (ref 1.8–7.3)
NEUTROPHILS RELATIVE PERCENT: 81.6 % (ref 43–80)
NITRITE, URINE: NEGATIVE
PDW BLD-RTO: 14.9 FL (ref 11.5–15)
PH UA: 7 (ref 5–9)
PH VENOUS: 7.43 (ref 7.35–7.45)
PLATELET # BLD: 234 E9/L (ref 130–450)
PMV BLD AUTO: 11.4 FL (ref 7–12)
POTASSIUM SERPL-SCNC: 3.8 MMOL/L (ref 3.5–5)
PROTEIN UA: NEGATIVE MG/DL
RBC # BLD: 4.05 E12/L (ref 3.5–5.5)
RBC UA: ABNORMAL /HPF (ref 0–2)
SODIUM BLD-SCNC: 134 MMOL/L (ref 132–146)
SOURCE: NORMAL
SPECIFIC GRAVITY UA: 1.01 (ref 1–1.03)
TOTAL PROTEIN: 6.3 G/DL (ref 6.4–8.3)
UROBILINOGEN, URINE: 0.2 E.U./DL
WBC # BLD: 7.3 E9/L (ref 4.5–11.5)
WBC UA: ABNORMAL /HPF (ref 0–5)

## 2022-12-08 PROCEDURE — 6360000002 HC RX W HCPCS: Performed by: EMERGENCY MEDICINE

## 2022-12-08 PROCEDURE — 82800 BLOOD PH: CPT

## 2022-12-08 PROCEDURE — 6360000002 HC RX W HCPCS

## 2022-12-08 PROCEDURE — 81001 URINALYSIS AUTO W/SCOPE: CPT

## 2022-12-08 PROCEDURE — 87591 N.GONORRHOEAE DNA AMP PROB: CPT

## 2022-12-08 PROCEDURE — 80076 HEPATIC FUNCTION PANEL: CPT

## 2022-12-08 PROCEDURE — 80048 BASIC METABOLIC PNL TOTAL CA: CPT

## 2022-12-08 PROCEDURE — 96372 THER/PROPH/DIAG INJ SC/IM: CPT

## 2022-12-08 PROCEDURE — 83605 ASSAY OF LACTIC ACID: CPT

## 2022-12-08 PROCEDURE — 96375 TX/PRO/DX INJ NEW DRUG ADDON: CPT

## 2022-12-08 PROCEDURE — 85025 COMPLETE CBC W/AUTO DIFF WBC: CPT

## 2022-12-08 PROCEDURE — 2580000003 HC RX 258: Performed by: EMERGENCY MEDICINE

## 2022-12-08 PROCEDURE — 87491 CHLMYD TRACH DNA AMP PROBE: CPT

## 2022-12-08 PROCEDURE — 82010 KETONE BODYS QUAN: CPT

## 2022-12-08 PROCEDURE — 76856 US EXAM PELVIC COMPLETE: CPT

## 2022-12-08 PROCEDURE — 74177 CT ABD & PELVIS W/CONTRAST: CPT

## 2022-12-08 PROCEDURE — 81025 URINE PREGNANCY TEST: CPT

## 2022-12-08 PROCEDURE — 99285 EMERGENCY DEPT VISIT HI MDM: CPT

## 2022-12-08 PROCEDURE — 96376 TX/PRO/DX INJ SAME DRUG ADON: CPT

## 2022-12-08 PROCEDURE — 6360000004 HC RX CONTRAST MEDICATION: Performed by: RADIOLOGY

## 2022-12-08 PROCEDURE — 93975 VASCULAR STUDY: CPT

## 2022-12-08 PROCEDURE — 83690 ASSAY OF LIPASE: CPT

## 2022-12-08 PROCEDURE — 96374 THER/PROPH/DIAG INJ IV PUSH: CPT

## 2022-12-08 RX ORDER — 0.9 % SODIUM CHLORIDE 0.9 %
1000 INTRAVENOUS SOLUTION INTRAVENOUS ONCE
Status: COMPLETED | OUTPATIENT
Start: 2022-12-08 | End: 2022-12-08

## 2022-12-08 RX ORDER — CEFTRIAXONE 500 MG/1
500 INJECTION, POWDER, FOR SOLUTION INTRAMUSCULAR; INTRAVENOUS ONCE
Status: COMPLETED | OUTPATIENT
Start: 2022-12-08 | End: 2022-12-08

## 2022-12-08 RX ORDER — FENTANYL CITRATE 50 UG/ML
50 INJECTION, SOLUTION INTRAMUSCULAR; INTRAVENOUS ONCE
Status: COMPLETED | OUTPATIENT
Start: 2022-12-08 | End: 2022-12-08

## 2022-12-08 RX ORDER — DOXYCYCLINE HYCLATE 100 MG
100 TABLET ORAL 2 TIMES DAILY
Qty: 14 TABLET | Refills: 0 | Status: SHIPPED | OUTPATIENT
Start: 2022-12-08 | End: 2022-12-15

## 2022-12-08 RX ORDER — BLOOD-GLUCOSE TRANSMITTER
EACH MISCELLANEOUS
Qty: 1 EACH | Refills: 3 | Status: SHIPPED | OUTPATIENT
Start: 2022-12-08

## 2022-12-08 RX ORDER — KETOROLAC TROMETHAMINE 30 MG/ML
30 INJECTION, SOLUTION INTRAMUSCULAR; INTRAVENOUS ONCE
Status: COMPLETED | OUTPATIENT
Start: 2022-12-08 | End: 2022-12-08

## 2022-12-08 RX ORDER — FENTANYL CITRATE 50 UG/ML
25 INJECTION, SOLUTION INTRAMUSCULAR; INTRAVENOUS ONCE
Status: COMPLETED | OUTPATIENT
Start: 2022-12-08 | End: 2022-12-08

## 2022-12-08 RX ORDER — HYDROCODONE BITARTRATE AND ACETAMINOPHEN 5; 325 MG/1; MG/1
1 TABLET ORAL EVERY 8 HOURS PRN
Qty: 10 TABLET | Refills: 0 | Status: SHIPPED | OUTPATIENT
Start: 2022-12-08 | End: 2022-12-11

## 2022-12-08 RX ORDER — BLOOD-GLUCOSE SENSOR
EACH MISCELLANEOUS
Qty: 3 EACH | Refills: 5 | Status: SHIPPED | OUTPATIENT
Start: 2022-12-08

## 2022-12-08 RX ORDER — ONDANSETRON 2 MG/ML
4 INJECTION INTRAMUSCULAR; INTRAVENOUS ONCE
Status: COMPLETED | OUTPATIENT
Start: 2022-12-08 | End: 2022-12-08

## 2022-12-08 RX ADMIN — FENTANYL CITRATE 25 MCG: 50 INJECTION INTRAMUSCULAR; INTRAVENOUS at 13:23

## 2022-12-08 RX ADMIN — FENTANYL CITRATE 50 MCG: 50 INJECTION INTRAMUSCULAR; INTRAVENOUS at 09:35

## 2022-12-08 RX ADMIN — CEFTRIAXONE SODIUM 500 MG: 500 INJECTION, POWDER, FOR SOLUTION INTRAMUSCULAR; INTRAVENOUS at 13:24

## 2022-12-08 RX ADMIN — SODIUM CHLORIDE 1000 ML: 9 INJECTION, SOLUTION INTRAVENOUS at 09:35

## 2022-12-08 RX ADMIN — KETOROLAC TROMETHAMINE 30 MG: 30 INJECTION, SOLUTION INTRAMUSCULAR; INTRAVENOUS at 09:17

## 2022-12-08 RX ADMIN — ONDANSETRON 4 MG: 2 INJECTION INTRAMUSCULAR; INTRAVENOUS at 09:35

## 2022-12-08 RX ADMIN — IOPAMIDOL 75 ML: 755 INJECTION, SOLUTION INTRAVENOUS at 10:34

## 2022-12-08 RX ADMIN — FENTANYL CITRATE 50 MCG: 50 INJECTION INTRAMUSCULAR; INTRAVENOUS at 11:30

## 2022-12-08 ASSESSMENT — ENCOUNTER SYMPTOMS
SHORTNESS OF BREATH: 0
CONSTIPATION: 0
VOMITING: 0
NAUSEA: 0
BACK PAIN: 1
ABDOMINAL PAIN: 1
DIARRHEA: 0

## 2022-12-08 ASSESSMENT — PAIN SCALES - GENERAL
PAINLEVEL_OUTOF10: 8
PAINLEVEL_OUTOF10: 10
PAINLEVEL_OUTOF10: 8
PAINLEVEL_OUTOF10: 9
PAINLEVEL_OUTOF10: 10

## 2022-12-08 ASSESSMENT — PAIN - FUNCTIONAL ASSESSMENT: PAIN_FUNCTIONAL_ASSESSMENT: 0-10

## 2022-12-08 NOTE — ED PROVIDER NOTES
Department of Emergency Medicine   ED  Provider Note    Pt Name: Mirta Yang         MRN: [id number]         [place] 1995    Admit Date/RoomTime: 2022  8:04 AM  ED Room: [facility] [address]      Chief Complaint   Patient presents with    Back Pain     For months, on going problem         Mirta Yang is a 24 y.o. female with PMHx of T1DM on insulin with multiple hospitalizations for DKA presenting to the ED for back and abdominal pain. Patient reports low back pain into left buttock and lower R quadrant abdominal pain since [place]. Describes the pain is different than that when she's in DKA, has been checking sugars and in 200's today. Reports anorexia, denies nausea, vomiting, constipation, diarrhea. Pain in back 10/10. Denies trauma to the area. Recently seen for abdominal pain 24 and discharged with Reglan for gastroparesis. Patient reports she has been taking it, last yesterday. Patient reports she is sexually active, has not had a period since  in October. Past surgical hx I&D suprapubic abscess. The history is provided by the patient. Suspect abdominal pain secondary to gastroparesis due to DM. Review of Systems   Constitutional:  Negative for chills and fever. Respiratory:  Negative for shortness of breath. Cardiovascular:  Negative for chest pain and leg swelling. Gastrointestinal:  Positive for abdominal pain. Negative for constipation, diarrhea, nausea and vomiting. Musculoskeletal:  Positive for back pain. Neurological:  Positive for dizziness. Negative for weakness and light-headedness. Physical Exam  Constitutional:       General: She is not in acute distress. HENT:      Head: Normocephalic and atraumatic. Eyes:      Extraocular Movements: Extraocular movements intact. Pupils: Pupils are equal, round, and reactive to light. Cardiovascular:      Rate and Rhythm: Normal rate and regular rhythm. Heart sounds: No murmur heard. No friction rub. No gallop. Pulmonary:      Breath sounds: No wheezing, rhonchi or rales. Abdominal:      Palpations: There is no mass. Tenderness: There is abdominal tenderness. There is no guarding. Comments: RLQ tenderness. Musculoskeletal:         General: Tenderness present. Right lower leg: No edema. Left lower leg: No edema. Comments: Low  to palpation bilaterally. Neurological:      Mental Status: She is oriented to person, place, and time. Procedures     --------------------------------------------- PAST HISTORY ---------------------------------------------  Past Medical History:  has a past medical history of Bleeding at insertion site, Common femoral artery injury, right, initial encounter, Depressive disorder, Diabetic ketoacidosis ([facility] DM type 1 (diabetes mellitus, type 1) ([facility] Marijuana abuse, Non-compliance, and Trichimoniasis. Past Surgical History:  has a past surgical history that includes Abdomen surgery (N/A, 5/9/2019) and Bartholin gland cyst excision. Social History:  reports that she has never smoked. She has never used smokeless tobacco. She reports current drug use. Drug: Marijuana Kamlesh [place]). She reports that she does not drink alcohol. Family History: family history includes Diabetes in her maternal grandmother and paternal grandmother; Stroke in an other family member. The patients home medications have been reviewed. Allergies: Patient has no known allergies.     -------------------------------------------------- RESULTS -------------------------------------------------    Lab  Results for orders placed or performed during the hospital encounter of 12/08/22   C.trachomatis N.gonorrhoeae DNA, Urine    Specimen: Urine   Result Value Ref Range    Source Urine    CBC with Auto Differential   Result Value Ref Range    WBC 7.3 4.5 - 11.5 E9/L    RBC 4.05 3.50 - 5.50 E12/L    Hemoglobin 11.1 (L) 11.5 - 15.5 g/dL    Hematocrit 32.8 (L) 34.0 - 48.0 %    MCV 81.0 80.0 - 99.9 fL    MCH 27.4 26.0 - 35.0 pg    MCHC 33.8 32.0 - 34.5 %    RDW 14.9 11.5 - 15.0 fL    Platelets 139 801 - 946 E9/L    MPV 11.4 7.0 - 12.0 fL    Neutrophils % 81.6 (H) 43.0 - 80.0 %    Immature Granulocytes % 0.4 0.0 - 5.0 %    Lymphocytes % 9.6 (L) 20.0 - 42.0 %    Monocytes % 8.1 2.0 - 12.0 %    Eosinophils % 0.0 0.0 - 6.0 %    Basophils % 0.3 0.0 - 2.0 %    Neutrophils Absolute 5.96 1.80 - 7.30 E9/L    Immature Granulocytes # 0.03 E9/L    Lymphocytes Absolute 0.70 (L) 1.50 - 4.00 E9/L    Monocytes Absolute 0.59 0.10 - 0.95 E9/L    Eosinophils Absolute 0.00 (L) 0.05 - 0.50 E9/L    Basophils Absolute 0.02 0.00 - 0.20 B2/U   Basic Metabolic Panel   Result Value Ref Range    Sodium 134 132 - 146 mmol/L    Potassium 3.8 3.5 - 5.0 mmol/L    Chloride 99 98 - 107 mmol/L    CO2 24 22 - 29 mmol/L    Anion Gap 11 7 - 16 mmol/L    Glucose 328 (H) 74 - 99 mg/dL    BUN 6 6 - 20 mg/dL    Creatinine 0.5 0.5 - 1.0 mg/dL    Est, Glom Filt Rate >60 >=60 mL/min/1.73    Calcium 8.6 8.6 - 10.2 mg/dL   Hepatic Function Panel   Result Value Ref Range    Total Protein 6.3 (L) 6.4 - 8.3 g/dL    Albumin 3.5 3.5 - 5.2 g/dL    Alkaline Phosphatase 55 35 - 104 U/L    ALT 18 0 - 32 U/L    AST 37 (H) 0 - 31 U/L    Total Bilirubin 0.2 0.0 - 1.2 mg/dL    Bilirubin, Direct <0.2 0.0 - 0.3 mg/dL    Bilirubin, Indirect see below 0.0 - 1.0 mg/dL   Lipase   Result Value Ref Range    Lipase 11 (L) 13 - 60 U/L   Pregnancy, urine   Result Value Ref Range    HCG(Urine) Pregnancy Test NEGATIVE NEGATIVE   Urinalysis with Microscopic   Result Value Ref Range    Color, UA Yellow Straw/Yellow    Clarity, UA SL CLOUDY Clear    Glucose, Ur >=1000 (A) Negative mg/dL    Bilirubin Urine Negative Negative    Ketones, Urine Negative Negative mg/dL    Specific Gravity, UA 1.010 1.005 - 1.030    Blood, Urine Negative Negative    pH, UA 7.0 5.0 - 9.0    Protein, UA Negative Negative mg/dL    Urobilinogen, Urine 0.2 <2.0 E. U./dL    Nitrite, Urine Negative Negative    Leukocyte Esterase, Urine TRACE (A) Negative    WBC, UA 5-10 (A) 0 - 5 /HPF    RBC, UA 0-1 0 - 2 /HPF    Epithelial Cells, UA MODERATE /HPF    Bacteria, UA MODERATE (A) None Seen /HPF    Amorphous, UA FEW    Beta-Hydroxybutyrate   Result Value Ref Range    Beta-Hydroxybutyrate 0.64 (H) 0.02 - 0.27 mmol/L   Lactic Acid   Result Value Ref Range    Lactic Acid 1.5 0.5 - 2.2 mmol/L   PH, VENOUS   Result Value Ref Range    pH, Christoph 7.43 7.35 - 7.45       Radiology  US DUP ABD PEL RETRO SCROT COMPLETE   Final Result   1. Probable physiologic right ovarian cyst measuring 1.8 cm. No follow-up   imaging recommended. 2.  Normal uterus. No free pelvic fluid. No adnexal masses. 3.  No evidence of ovarian torsion         US PELVIS COMPLETE   Final Result   1. Probable physiologic right ovarian cyst measuring 1.8 cm. No follow-up   imaging recommended. 2.  Normal uterus. No free pelvic fluid. No adnexal masses. 3.  No evidence of ovarian torsion         CT ABDOMEN PELVIS W IV CONTRAST Additional Contrast? None   Final Result   Anteverted uterus, cysts visualized in bilateral ovaries with the mild   stranding of the adjacent fat planes and subtle free fluid seen in the   cul-de-sac. Recommend clinical correlation and if clinically indicated   further evaluation with a pelvic ultrasound. Thickening of the wall of the urinary bladder is seen. Abundance of stool in the large bowel.             ------------------------- NURSING NOTES AND VITALS REVIEWED ---------------------------  Date / Time Roomed:  12/8/2022  8:04 AM  ED Bed Assignment:  \[facility]\""/[place]    The nursing notes within the ED encounter and vital signs as below have been reviewed.    Patient Vitals for the past 24 hrs:   BP Temp Pulse Resp SpO2 Weight   12/08/22 1305 139/71 98.2 °F (36.8 °C) 88 17 98 % --   12/08/22 0819 (!) 160/82 98.2 °F (36.8 °C) (!) 101 18 99 % 118 lb (53.5 kg) Oxygen Saturation Interpretation: Normal      --------------------------------------- ED Clinical Course/MDM --------------------------------------    Mojgan Peterson presents to the ED for abdominal and back pain. Workup in the ED revealed right ovarian cyst vs discomfort secondary to STI. Lab work was insignificant given presentation and patient's urine was sent to be tested for chlamydia and gonorrhea. Patient was given toradol, fluid bolus, fentanyl, zofran, and ceftriaxone for their symptoms with mild improvement. Patient continues to be non-toxic on re-evaluation. Findings were discussed with the patient and reasons to immediately return to the ED were articulated to them. They will follow-up with their PCP, Dr. Kyle Molina and KIM.     ------------------------------------------ PROGRESS NOTES ------------------------------------------  1:35 PM EST  I have spoken with the patient and family if present and discussed todays results, in addition to providing specific details for the plan of care and counseling regarding the diagnosis and prognosis. Their questions are answered at this time and they are agreeable with the plan. I discussed at length with them reasons for immediate return here for re evaluation. They will followup with their primary care providers by calling their office as soon as possible.    --------------------------------- ADDITIONAL PROVIDER NOTES ---------------------------------  At this time the patient is without objective evidence of an acute process requiring hospitalization or inpatient management. They have remained hemodynamically stable throughout their entire ED visit and are stable for discharge with outpatient follow-up. The plan has been discussed in detail and they are aware of the specific conditions for emergent return, as well as the importance of follow-up.       New Prescriptions    DOXYCYCLINE HYCLATE (VIBRA-TABS) 100 MG TABLET    Take 1 tablet by mouth 2 times daily for 7 days    HYDROCODONE-ACETAMINOPHEN (NORCO) 5-325 MG PER TABLET    Take 1 tablet by mouth every 8 hours as needed for Pain for up to 3 days. Intended supply: 3 days. Take lowest dose possible to manage pain       Diagnosis:  1. Cyst of right ovary Stable   2. Pain in joint involving pelvic region and thigh, unspecified laterality        Disposition:  Patient's disposition: Discharge to home  Patient's condition is stable.                  Wendy Hammonds,   Resident  12/08/22 1131

## 2022-12-10 ENCOUNTER — HOSPITAL ENCOUNTER (EMERGENCY)
Age: 27
Discharge: LEFT BEFORE DISCHARGE | End: 2022-12-10
Attending: STUDENT IN AN ORGANIZED HEALTH CARE EDUCATION/TRAINING PROGRAM
Payer: COMMERCIAL

## 2022-12-10 ENCOUNTER — APPOINTMENT (OUTPATIENT)
Dept: ULTRASOUND IMAGING | Age: 27
End: 2022-12-10
Payer: COMMERCIAL

## 2022-12-10 ENCOUNTER — TELEPHONE (OUTPATIENT)
Dept: ENDOCRINOLOGY | Age: 27
End: 2022-12-10

## 2022-12-10 VITALS
HEART RATE: 93 BPM | TEMPERATURE: 97.4 F | OXYGEN SATURATION: 99 % | HEIGHT: 61 IN | DIASTOLIC BLOOD PRESSURE: 124 MMHG | RESPIRATION RATE: 16 BRPM | BODY MASS INDEX: 22.28 KG/M2 | WEIGHT: 118 LBS | SYSTOLIC BLOOD PRESSURE: 171 MMHG

## 2022-12-10 DIAGNOSIS — R10.9 ABDOMINAL PAIN, UNSPECIFIED ABDOMINAL LOCATION: Primary | ICD-10-CM

## 2022-12-10 DIAGNOSIS — R10.2 PAIN, PELVIC, FEMALE: ICD-10-CM

## 2022-12-10 DIAGNOSIS — N83.519 OVARIAN TORSION: ICD-10-CM

## 2022-12-10 LAB
ALBUMIN SERPL-MCNC: 3.8 G/DL (ref 3.5–5.2)
ALP BLD-CCNC: 69 U/L (ref 35–104)
ALT SERPL-CCNC: 34 U/L (ref 0–32)
ANION GAP SERPL CALCULATED.3IONS-SCNC: 8 MMOL/L (ref 7–16)
AST SERPL-CCNC: 58 U/L (ref 0–31)
BACTERIA: ABNORMAL /HPF
BASOPHILS ABSOLUTE: 0.01 E9/L (ref 0–0.2)
BASOPHILS RELATIVE PERCENT: 0.3 % (ref 0–2)
BILIRUB SERPL-MCNC: <0.2 MG/DL (ref 0–1.2)
BILIRUBIN URINE: NEGATIVE
BLOOD, URINE: NEGATIVE
BUN BLDV-MCNC: 5 MG/DL (ref 6–20)
CALCIUM SERPL-MCNC: 9.1 MG/DL (ref 8.6–10.2)
CHLORIDE BLD-SCNC: 99 MMOL/L (ref 98–107)
CLARITY: CLEAR
CO2: 29 MMOL/L (ref 22–29)
COLOR: YELLOW
CREAT SERPL-MCNC: 0.4 MG/DL (ref 0.5–1)
EOSINOPHILS ABSOLUTE: 0.06 E9/L (ref 0.05–0.5)
EOSINOPHILS RELATIVE PERCENT: 1.6 % (ref 0–6)
EPITHELIAL CELLS, UA: ABNORMAL /HPF
GFR SERPL CREATININE-BSD FRML MDRD: >60 ML/MIN/1.73
GLUCOSE BLD-MCNC: 153 MG/DL (ref 74–99)
GLUCOSE URINE: 500 MG/DL
HCG(URINE) PREGNANCY TEST: NEGATIVE
HCT VFR BLD CALC: 32.9 % (ref 34–48)
HEMOGLOBIN: 10.8 G/DL (ref 11.5–15.5)
HYALINE CASTS: ABNORMAL /LPF (ref 0–2)
IMMATURE GRANULOCYTES #: 0.01 E9/L
IMMATURE GRANULOCYTES %: 0.3 % (ref 0–5)
KETONES, URINE: NEGATIVE MG/DL
LACTIC ACID: 1.4 MMOL/L (ref 0.5–2.2)
LEUKOCYTE ESTERASE, URINE: NEGATIVE
LYMPHOCYTES ABSOLUTE: 1.52 E9/L (ref 1.5–4)
LYMPHOCYTES RELATIVE PERCENT: 39.5 % (ref 20–42)
MCH RBC QN AUTO: 26.5 PG (ref 26–35)
MCHC RBC AUTO-ENTMCNC: 32.8 % (ref 32–34.5)
MCV RBC AUTO: 80.8 FL (ref 80–99.9)
MONOCYTES ABSOLUTE: 0.39 E9/L (ref 0.1–0.95)
MONOCYTES RELATIVE PERCENT: 10.1 % (ref 2–12)
NEUTROPHILS ABSOLUTE: 1.86 E9/L (ref 1.8–7.3)
NEUTROPHILS RELATIVE PERCENT: 48.2 % (ref 43–80)
NITRITE, URINE: NEGATIVE
PDW BLD-RTO: 14.6 FL (ref 11.5–15)
PH UA: 7.5 (ref 5–9)
PLATELET # BLD: 255 E9/L (ref 130–450)
PMV BLD AUTO: 10.9 FL (ref 7–12)
POTASSIUM SERPL-SCNC: 4 MMOL/L (ref 3.5–5)
PROTEIN UA: 30 MG/DL
RBC # BLD: 4.07 E12/L (ref 3.5–5.5)
RBC UA: ABNORMAL /HPF (ref 0–2)
REASON FOR REJECTION: NORMAL
REJECTED TEST: NORMAL
SODIUM BLD-SCNC: 136 MMOL/L (ref 132–146)
SPECIFIC GRAVITY UA: 1.02 (ref 1–1.03)
TOTAL PROTEIN: 7.1 G/DL (ref 6.4–8.3)
UROBILINOGEN, URINE: 0.2 E.U./DL
WBC # BLD: 3.9 E9/L (ref 4.5–11.5)
WBC UA: ABNORMAL /HPF (ref 0–5)

## 2022-12-10 PROCEDURE — 76856 US EXAM PELVIC COMPLETE: CPT

## 2022-12-10 PROCEDURE — 99284 EMERGENCY DEPT VISIT MOD MDM: CPT

## 2022-12-10 PROCEDURE — 83605 ASSAY OF LACTIC ACID: CPT

## 2022-12-10 PROCEDURE — 81001 URINALYSIS AUTO W/SCOPE: CPT

## 2022-12-10 PROCEDURE — 81025 URINE PREGNANCY TEST: CPT

## 2022-12-10 PROCEDURE — 93975 VASCULAR STUDY: CPT

## 2022-12-10 PROCEDURE — 85025 COMPLETE CBC W/AUTO DIFF WBC: CPT

## 2022-12-10 PROCEDURE — 36415 COLL VENOUS BLD VENIPUNCTURE: CPT

## 2022-12-10 PROCEDURE — 80053 COMPREHEN METABOLIC PANEL: CPT

## 2022-12-10 RX ORDER — HYDROCODONE BITARTRATE AND ACETAMINOPHEN 5; 325 MG/1; MG/1
1 TABLET ORAL ONCE
Status: DISCONTINUED | OUTPATIENT
Start: 2022-12-10 | End: 2022-12-10 | Stop reason: HOSPADM

## 2022-12-10 ASSESSMENT — ENCOUNTER SYMPTOMS
SHORTNESS OF BREATH: 0
BACK PAIN: 0
CONSTIPATION: 0
WHEEZING: 0
VOMITING: 0
ABDOMINAL PAIN: 1
DIARRHEA: 0
NAUSEA: 0
COUGH: 0
BLOOD IN STOOL: 0
CHEST TIGHTNESS: 0

## 2022-12-10 ASSESSMENT — PAIN SCALES - GENERAL: PAINLEVEL_OUTOF10: 9

## 2022-12-10 ASSESSMENT — PAIN DESCRIPTION - DESCRIPTORS: DESCRIPTORS: SHARP;STABBING

## 2022-12-10 ASSESSMENT — PAIN DESCRIPTION - LOCATION: LOCATION: ABDOMEN

## 2022-12-10 ASSESSMENT — PAIN DESCRIPTION - ORIENTATION: ORIENTATION: RIGHT;LOWER

## 2022-12-10 ASSESSMENT — PAIN - FUNCTIONAL ASSESSMENT: PAIN_FUNCTIONAL_ASSESSMENT: 0-10

## 2022-12-10 NOTE — ED PROVIDER NOTES
[address]      Pt Name: Vanessa Luke  MRN: [id number]  [place] 1995  Date of evaluation: 12/10/2022      CHIEF COMPLAINT       Chief Complaint   Patient presents with    Abdominal Pain     Right abd pain, known ovarian cyst, states ran out of pain meds         HPI  Vanessa Luke is a 24 y.o. female  with PMHx of ovarian cyst presents with right-sided abdominal pain. Patient states that she was recently diagnosed with ovarian cyst on the ED and sent home with Norco's. She is returning as she is running out of medications and would like a refill. States that she has not been able to follow-up with Dr. Ignacio Rushing yet but has an appointment on Monday. States pain was well controlled with medication however now that she is running out pain is returned. Describes the pain as worse in the right lower quadrant, radiation towards her flank region. Describes the pain as achy, persistent, progressively worsening. Endorses some pressure with bowel movement but denies any dysuria or hematuria or melena. Describes symptoms moderate in severity with no alleviating or exacerbating factors. Denies any fever, chills, n/v, headache, dizziness, vision changes, neck tenderness or stiffness, weakness, cp, palpitations, leg swelling/tenderness, sob, cough, dysuria, hematuria, diarrhea, constipation. Except as noted above the remainder of the review of systems was reviewed and negative. Review of Systems   Constitutional:  Negative for activity change, appetite change, chills, fatigue and fever. HENT:  Negative for congestion, nosebleeds and tinnitus. Eyes:  Negative for visual disturbance. Respiratory:  Negative for cough, chest tightness, shortness of breath and wheezing. Cardiovascular:  Negative for chest pain, palpitations and leg swelling. Gastrointestinal:  Positive for abdominal pain.  Negative for blood in stool, constipation, diarrhea, nausea and vomiting. Endocrine: Negative for polydipsia and polyphagia. Genitourinary:  Negative for dysuria, flank pain, hematuria, vaginal bleeding, vaginal discharge and vaginal pain. Musculoskeletal:  Negative for back pain, gait problem, joint swelling and myalgias. Skin:  Negative for rash. Allergic/Immunologic: Negative for immunocompromised state. Neurological:  Negative for dizziness, syncope, weakness, numbness and headaches. Hematological:  Negative for adenopathy. Psychiatric/Behavioral:  Negative for behavioral problems, confusion and hallucinations. Physical Exam  Vitals and nursing note reviewed. Constitutional:       General: She is not in acute distress. Appearance: Normal appearance. She is normal weight. She is not ill-appearing, toxic-appearing or diaphoretic. HENT:      Head: Normocephalic and atraumatic. Right Ear: External ear normal.      Left Ear: External ear normal.      Nose: Nose normal. No congestion or rhinorrhea. Mouth/Throat:      Mouth: Mucous membranes are moist.      Pharynx: Oropharynx is clear. No oropharyngeal exudate or posterior oropharyngeal erythema. Eyes:      Conjunctiva/sclera: Conjunctivae normal.      Pupils: Pupils are equal, round, and reactive to light. Cardiovascular:      Rate and Rhythm: Normal rate and regular rhythm. Pulses: Normal pulses. Heart sounds: Normal heart sounds. Pulmonary:      Effort: Pulmonary effort is normal.      Breath sounds: Normal breath sounds. Abdominal:      General: Abdomen is flat. Bowel sounds are normal. There is no distension. Palpations: Abdomen is soft. There is no mass. Tenderness: There is abdominal tenderness in the right lower quadrant. There is no right CVA tenderness, left CVA tenderness or guarding. Negative signs include Marrufo's sign, Rovsing's sign, McBurney's sign and psoas sign. Hernia: No hernia is present. Musculoskeletal:      Cervical back: Normal range of motion. Skin:     General: Skin is warm and dry. Capillary Refill: Capillary refill takes less than 2 seconds. Neurological:      General: No focal deficit present. Mental Status: She is alert and oriented to person, place, and time. Mental status is at baseline. Psychiatric:         Mood and Affect: Mood normal.         Behavior: Behavior normal.         Thought Content: Thought content normal.        Procedures     MDM         24 y.o. female  with PMHx of ovarian cyst presents with right-sided abdominal pain. Patient states that she was recently diagnosed with ovarian cyst on the ED and sent home with a few Norco's. While in the ED patient was hemodynamically stable, afebrile, nontoxic-appearing, in no respiratory distress. Labs unremarkable. Ultrasound remarkable with right ovarian follicle measuring 2 cm was previously 1.8 cm. Patient eloped prior to completion of evaluation. ED Course as of 12/11/22 0130   Sat Dec 10, 2022   1835 IMPRESSION:  No significant change in right ovarian dominant follicle measuring 2.0 cm,  previously 1.8 cm. There is a new small amount of free fluid adjacent to the  right ovary and in the cul-de-sac, which could be related to interval  ovulation. No evidence of ovarian torsion. [TC]      ED Course User Index  [TC] Erin Lala MD       --------------------------------------------- PAST HISTORY ---------------------------------------------  Past Medical History:  has a past medical history of Bleeding at insertion site, Common femoral artery injury, right, initial encounter, Depressive disorder, Diabetic ketoacidosis ([address] DM type 1 (diabetes mellitus, type 1) ([address] Marijuana abuse, Non-compliance, and Trichimoniasis. Past Surgical History:  has a past surgical history that includes Abdomen surgery (N/A, 5/9/2019) and Bartholin gland cyst excision.     Social History:  reports that she has never smoked. She has never used smokeless tobacco. She reports current drug use. Drug: Marijuana [place]). She reports that she does not drink alcohol. Family History: family history includes Diabetes in her maternal grandmother and paternal grandmother; Stroke in an other family member. The patients home medications have been reviewed. Allergies: Patient has no known allergies.     -------------------------------------------------- RESULTS -------------------------------------------------  Labs:  Results for orders placed or performed during the hospital encounter of 12/10/22   CMP   Result Value Ref Range    Sodium 136 132 - 146 mmol/L    Potassium 4.0 3.5 - 5.0 mmol/L    Chloride 99 98 - 107 mmol/L    CO2 29 22 - 29 mmol/L    Anion Gap 8 7 - 16 mmol/L    Glucose 153 (H) 74 - 99 mg/dL    BUN 5 (L) 6 - 20 mg/dL    Creatinine 0.4 (L) 0.5 - 1.0 mg/dL    Est, Glom Filt Rate >60 >=60 mL/min/1.73    Calcium 9.1 8.6 - 10.2 mg/dL    Total Protein 7.1 6.4 - 8.3 g/dL    Albumin 3.8 3.5 - 5.2 g/dL    Total Bilirubin <0.2 0.0 - 1.2 mg/dL    Alkaline Phosphatase 69 35 - 104 U/L    ALT 34 (H) 0 - 32 U/L    AST 58 (H) 0 - 31 U/L   Lactic Acid (Select if patient is over 65 to rule out mesenteric ischemia)   Result Value Ref Range    Lactic Acid 1.4 0.5 - 2.2 mmol/L   SPECIMEN REJECTION   Result Value Ref Range    Rejected Test CBCWD     Reason for Rejection see below    CBC with Auto Differential   Result Value Ref Range    WBC 3.9 (L) 4.5 - 11.5 E9/L    RBC 4.07 3.50 - 5.50 E12/L    Hemoglobin 10.8 (L) 11.5 - 15.5 g/dL    Hematocrit 32.9 (L) 34.0 - 48.0 %    MCV 80.8 80.0 - 99.9 fL    MCH 26.5 26.0 - 35.0 pg    MCHC 32.8 32.0 - 34.5 %    RDW 14.6 11.5 - 15.0 fL    Platelets 220 359 - 279 E9/L    MPV 10.9 7.0 - 12.0 fL    Neutrophils % 48.2 43.0 - 80.0 %    Immature Granulocytes % 0.3 0.0 - 5.0 %    Lymphocytes % 39.5 20.0 - 42.0 %    Monocytes % 10.1 2.0 - 12.0 %    Eosinophils % 1.6 0.0 - 6.0 %    Basophils % 0.3 0.0 - 2.0 %    Neutrophils Absolute 1.86 1.80 - 7.30 E9/L    Immature Granulocytes # 0.01 E9/L    Lymphocytes Absolute 1.52 1.50 - 4.00 E9/L    Monocytes Absolute 0.39 0.10 - 0.95 E9/L    Eosinophils Absolute 0.06 0.05 - 0.50 E9/L    Basophils Absolute 0.01 0.00 - 0.20 E9/L   Urinalysis with Microscopic   Result Value Ref Range    Color, UA Yellow Straw/Yellow    Clarity, UA Clear Clear    Glucose, Ur 500 (A) Negative mg/dL    Bilirubin Urine Negative Negative    Ketones, Urine Negative Negative mg/dL    Specific Gravity, UA 1.020 1.005 - 1.030    Blood, Urine Negative Negative    pH, UA 7.5 5.0 - 9.0    Protein, UA 30 (A) Negative mg/dL    Urobilinogen, Urine 0.2 <2.0 E.U./dL    Nitrite, Urine Negative Negative    Leukocyte Esterase, Urine Negative Negative    Hyaline Casts, UA 0-2 0 - 2 /LPF    WBC, UA 0-1 0 - 5 /HPF    RBC, UA 0-1 0 - 2 /HPF    Epithelial Cells, UA MODERATE /HPF    Bacteria, UA MODERATE (A) None Seen /HPF   Pregnancy, urine   Result Value Ref Range    HCG(Urine) Pregnancy Test NEGATIVE NEGATIVE       Radiology:  US NON OB TRANSVAGINAL    Result Date: 11/30/2022  EXAMINATION: PELVIC ULTRASOUND; DOPPLER EVALUATION OF THE PELVIS 11/30/2022 TECHNIQUE: Transvaginal pelvic ultrasound duplex ultrasound using B-mode/gray scaled imaging, Doppler spectral analysis and color flow Doppler was obtained. COMPARISON: Ultrasound pelvis from November 24, 2022. CT abdomen and pelvis from November 26, 2022 HISTORY: ORDERING SYSTEM PROVIDED HISTORY: pain TECHNOLOGIST PROVIDED HISTORY: Reason for exam:->pain What reading provider will be dictating this exam?->CRC FINDINGS: Measurements: Uterus: 6.1 cm Endometrial stripe: 2-3 mm Right Ovary:2.0 x 1.7 x 3.5 cm Left Ovary: 3.1 x 2.6 x 1.7 cm Ultrasound Findings: Uterus: Uterus demonstrates normal myometrial echotexture. Endometrial stripe: Mildly heterogeneous appearance of the endometrium. No abnormal vascularity identified over the endometrium on color Doppler evaluation. There is a small amount of fluid within the endometrial canal. Right Ovary: Right ovary is within normal limits. There is normal arterial and venous Doppler flow. No right adnexal mass. Left Ovary:  Left ovary is within normal limits. There is normal arterial and venous Doppler flow. No left adnexal mass. Free Fluid: Small amount of simple appearing fluid in the cul-de-sac. 1.  Mildly heterogeneous appearance of the endometrium. No abnormal vascularity identified over the endometrium on color Doppler evaluation (lowers suspicion for retained products of conception). There is a small amount of simple appearing fluid within the endometrial canal. 2.  Otherwise normal appearance of the uterus. 3.  Normal appearance of the bilateral ovaries. There are no adnexal masses. Normal ovarian vascularity. 4.  Trace free fluid identified in the cul-de-sac. US PELVIS COMPLETE    Result Date: 12/10/2022  EXAMINATION: PELVIC ULTRASOUND; DOPPLER EVALUATION OF THE PELVIS 12/10/2022 TECHNIQUE: Transvaginal pelvic ultrasound duplex ultrasound using B-mode/gray scaled imaging, Doppler spectral analysis and color flow Doppler was obtained. COMPARISON: Pelvic ultrasound 12/08/2022. CT abdomen and pelvis with contrast 12/08/2022. HISTORY: ORDERING SYSTEM PROVIDED HISTORY: Pain TECHNOLOGIST PROVIDED HISTORY: Reason for exam:->Pain Reason for exam:->r/o torsion What reading provider will be dictating this exam?->CRC; ORDERING SYSTEM PROVIDED HISTORY: Pain, pelvic, female TECHNOLOGIST PROVIDED HISTORY: Reason for exam:->R/O torsion What reading provider will be dictating this exam?->CRC FINDINGS: Measurements: Uterus: 6.7 x 4.6 x 3.2 cm. Endometrial stripe: 6.7 mm. Right Ovary:3.8 x 2.5 x 2.3 cm. Left Ovary: 3.0 x 1.6 x 1.9 cm. Ultrasound Findings: Uterus: Uterus demonstrates normal myometrial echotexture. Couple small nabothian cysts are noted. Endometrial stripe: Endometrial stripe is within normal limits.  Right Ovary: Right ovarian dominant follicle is identified measuring 2.0 x 1.4 x 1.3 cm. There is normal arterial and venous Doppler flow. Left Ovary:  Left ovary is within normal limits. There is normal arterial and venous Doppler flow. Free Fluid: Small amount of free fluid is seen adjacent to the right ovary and in the cul-de-sac. The fluid is new compared to the recent exam.     No significant change in right ovarian dominant follicle measuring 2.0 cm, previously 1.8 cm. There is a new small amount of free fluid adjacent to the right ovary and in the cul-de-sac, which could be related to interval ovulation. No evidence of ovarian torsion. US PELVIS COMPLETE    Result Date: 12/8/2022  EXAMINATION: PELVIC ULTRASOUND; DOPPLER EVALUATION OF THE PELVIS 12/8/2022 TECHNIQUE: Transabdominal pelvic ultrasound duplex ultrasound using B-mode/gray scaled imaging, Doppler spectral analysis and color flow Doppler was obtained. COMPARISON: None HISTORY: ORDERING SYSTEM PROVIDED HISTORY: Pain TECHNOLOGIST PROVIDED HISTORY: Reason for exam:->Pain What reading provider will be dictating this exam?->CRC FINDINGS: Measurements: Uterus: 8.1 x 4.2 x 3.1 cm Endometrial stripe: 5 mm Right Ovary:3.9 x 2.5 x 2.5 cm Left Ovary: 4.2 x 2.9 x 1.7 cm Ultrasound Findings: Uterus: Uterus demonstrates normal myometrial echotexture. Uterus appears anteverted. No masses. Endometrial stripe: Endometrial stripe is within normal limits. Right Ovary: Right ovary is within normal limits. There is a physiologic 1.8 x 1.3 x 1.2 cm follicular cyst.  Normal arterial and venous flow with color Doppler and spectral Doppler analysis. Left Ovary:  Left ovary is within normal limits. There is normal arterial and venous Doppler flow. Free Fluid: No evidence of free fluid. 1.  Probable physiologic right ovarian cyst measuring 1.8 cm. No follow-up imaging recommended. 2.  Normal uterus. No free pelvic fluid. No adnexal masses.  3.  No evidence of ovarian torsion     US PELVIS COMPLETE    Result Date: 11/24/2022  EXAMINATION: PELVIC ULTRASOUND 11/24/2022 TECHNIQUE: Transvaginal pelvic ultrasound duplex ultrasound using B-mode/gray scaled imaging, Doppler spectral analysis and color flow Doppler was obtained. COMPARISON: None HISTORY: ORDERING SYSTEM PROVIDED HISTORY: pelvic pain, diarrhea, D&C 2 weeks ago TECHNOLOGIST PROVIDED HISTORY: Reason for exam:->pelvic pain, diarrhea, D&C 2 weeks ago What reading provider will be dictating this exam?->CRC FINDINGS: The uterus is anteverted measuring 6.5 x 3.7 x 3.8 cm. The endometrial stripe measures 6.7 mm. Right adnexa measures 2.1 x 2.0 x 2.0 cm. Normal Doppler flow signal noted. Left adnexa measures 3.2 x 1.7 x 2.3 cm with normal Doppler flow signal. There is minimal to mild free fluid in the cul-de-sac, likely physiologic. Unremarkable pelvic ultrasound. CT ABDOMEN PELVIS W IV CONTRAST Additional Contrast? None    Result Date: 12/8/2022  EXAMINATION: CT OF THE ABDOMEN AND PELVIS WITH CONTRAST 12/8/2022 10:35 am TECHNIQUE: CT of the abdomen and pelvis was performed with the administration of intravenous contrast. Multiplanar reformatted images are provided for review. Automated exposure control, iterative reconstruction, and/or weight based adjustment of the mA/kV was utilized to reduce the radiation dose to as low as reasonably achievable. COMPARISON: 11/28/2022. HISTORY: ORDERING SYSTEM PROVIDED HISTORY: Right lower abdomen pain TECHNOLOGIST PROVIDED HISTORY: Reason for exam:->Right lower abdomen pain Additional Contrast?->None Decision Support Exception - unselect if not a suspected or confirmed emergency medical condition->Emergency Medical Condition (MA) FINDINGS: Lower Chest: Limited evaluation of the lower lung fields demonstrates unremarkable bronchovascular markings with no evidence of focal infiltrate or consolidation. No evidence of acute cardiopulmonary disease is seen. Organs:  The liver demonstrates unremarkable attenuation, no evidence of masses no evidence of intrahepatic biliary dilatation is seen. The gallbladder is unremarkable, no evidence of gallbladder wall thickening or pericholecystic  stranding. The common bile duct is unremarkable. The pancreas and spleen demonstrate no evidence of masses. The adrenal glands demonstrate unremarkable contours with no evidence of masses. The kidneys demonstrate no evidence of stones no evidence of renal masses. No evidence of hydronephrosis or hydroureter is seen. GI/Bowel: The stomach is unremarkable, no evidence of masses. No significant distention of the small and large bowel loops is visualized. The appendix is visualized and is unremarkable. Abundance of stool is visualized in the large bowel. Pelvis: The urinary bladder is distended, bladder wall measures approximately 0.5 cm. Anteverted uterus is seen, prominent cysts visualized in bilateral ovaries, subtle stranding of the fat planes of the pelvis with small amount of free fluid visualized in the cul-de-sac. No evidence of free fluid in the pelvis. No evidence of pelvic mass is seen. Peritoneum/Retroperitoneum: No evidence of free fluid or air within the peritoneal cavity. Bones/Soft Tissues: Abdominal wall soft tissues are unremarkable. The bones are unremarkable. Anteverted uterus, cysts visualized in bilateral ovaries with the mild stranding of the adjacent fat planes and subtle free fluid seen in the cul-de-sac. Recommend clinical correlation and if clinically indicated further evaluation with a pelvic ultrasound. Thickening of the wall of the urinary bladder is seen. Abundance of stool in the large bowel.      CT ABDOMEN PELVIS W IV CONTRAST Additional Contrast? None    Result Date: 11/26/2022  EXAMINATION: CT OF THE ABDOMEN AND PELVIS WITH CONTRAST 11/26/2022 11:09 pm TECHNIQUE: CT of the abdomen and pelvis was performed with the administration of intravenous contrast. Multiplanar reformatted images are provided for review. Automated exposure control, iterative reconstruction, and/or weight based adjustment of the mA/kV was utilized to reduce the radiation dose to as low as reasonably achievable. COMPARISON: 08/15/2022 HISTORY: ORDERING SYSTEM PROVIDED HISTORY: abd pain TECHNOLOGIST PROVIDED HISTORY: Additional Contrast?->None Reason for exam:->abd pain Decision Support Exception - unselect if not a suspected or confirmed emergency medical condition->Emergency Medical Condition (MA) FINDINGS: Lower Chest:  Visualized portion of the lower chest demonstrates no acute abnormality. Organs: The liver, gallbladder, spleen, pancreas, adrenals, and kidneys are unremarkable. GI/Bowel: There is no evidence of bowel obstruction. No evidence of abnormal bowel wall thickening or distension. The appendix is normal. Pelvis: There is circumferential bladder wall thickening. The uterus is grossly unremarkable. Peritoneum/Retroperitoneum: Trace free fluid layering within the pelvis. No extraluminal gas. No evidence of lymphadenopathy. Aorta is normal in caliber. Bones/Soft Tissues:  No acute abnormality of the visualized osseous structures. Circumferential bladder wall thickening, which could suggest cystitis. Clinical and laboratory correlation recommended. XR CHEST PORTABLE    Result Date: 11/24/2022  EXAMINATION: ONE XRAY VIEW OF THE CHEST 11/24/2022 2:24 pm COMPARISON: 08/16/2022 HISTORY: ORDERING SYSTEM PROVIDED HISTORY: hypoglycemia, diarrhea TECHNOLOGIST PROVIDED HISTORY: Reason for exam:->hypoglycemia, diarrhea What reading provider will be dictating this exam?->CRC FINDINGS: The lungs are without acute focal process. There is no effusion or pneumothorax. The cardiomediastinal silhouette is without acute process. The osseous structures are without acute process. No acute process.      US DUP ABD PEL RETRO SCROT COMPLETE    Result Date: 12/10/2022  EXAMINATION: PELVIC ULTRASOUND; DOPPLER EVALUATION OF THE PELVIS 12/10/2022 TECHNIQUE: Transvaginal pelvic ultrasound duplex ultrasound using B-mode/gray scaled imaging, Doppler spectral analysis and color flow Doppler was obtained. COMPARISON: Pelvic ultrasound 12/08/2022. CT abdomen and pelvis with contrast 12/08/2022. HISTORY: ORDERING SYSTEM PROVIDED HISTORY: Pain TECHNOLOGIST PROVIDED HISTORY: Reason for exam:->Pain Reason for exam:->r/o torsion What reading provider will be dictating this exam?->CRC; ORDERING SYSTEM PROVIDED HISTORY: Pain, pelvic, female TECHNOLOGIST PROVIDED HISTORY: Reason for exam:->R/O torsion What reading provider will be dictating this exam?->CRC FINDINGS: Measurements: Uterus: 6.7 x 4.6 x 3.2 cm. Endometrial stripe: 6.7 mm. Right Ovary:3.8 x 2.5 x 2.3 cm. Left Ovary: 3.0 x 1.6 x 1.9 cm. Ultrasound Findings: Uterus: Uterus demonstrates normal myometrial echotexture. Couple small nabothian cysts are noted. Endometrial stripe: Endometrial stripe is within normal limits. Right Ovary: Right ovarian dominant follicle is identified measuring 2.0 x 1.4 x 1.3 cm. There is normal arterial and venous Doppler flow. Left Ovary:  Left ovary is within normal limits. There is normal arterial and venous Doppler flow. Free Fluid: Small amount of free fluid is seen adjacent to the right ovary and in the cul-de-sac. The fluid is new compared to the recent exam.     No significant change in right ovarian dominant follicle measuring 2.0 cm, previously 1.8 cm. There is a new small amount of free fluid adjacent to the right ovary and in the cul-de-sac, which could be related to interval ovulation. No evidence of ovarian torsion. US DUP ABD PEL RETRO SCROT COMPLETE    Result Date: 12/8/2022  EXAMINATION: PELVIC ULTRASOUND; DOPPLER EVALUATION OF THE PELVIS 12/8/2022 TECHNIQUE: Transabdominal pelvic ultrasound duplex ultrasound using B-mode/gray scaled imaging, Doppler spectral analysis and color flow Doppler was obtained.  COMPARISON: None HISTORY: ORDERING SYSTEM PROVIDED HISTORY: Pain TECHNOLOGIST PROVIDED HISTORY: Reason for exam:->Pain What reading provider will be dictating this exam?->CRC FINDINGS: Measurements: Uterus: 8.1 x 4.2 x 3.1 cm Endometrial stripe: 5 mm Right Ovary:3.9 x 2.5 x 2.5 cm Left Ovary: 4.2 x 2.9 x 1.7 cm Ultrasound Findings: Uterus: Uterus demonstrates normal myometrial echotexture. Uterus appears anteverted. No masses. Endometrial stripe: Endometrial stripe is within normal limits. Right Ovary: Right ovary is within normal limits. There is a physiologic 1.8 x 1.3 x 1.2 cm follicular cyst.  Normal arterial and venous flow with color Doppler and spectral Doppler analysis. Left Ovary:  Left ovary is within normal limits. There is normal arterial and venous Doppler flow. Free Fluid: No evidence of free fluid. 1.  Probable physiologic right ovarian cyst measuring 1.8 cm. No follow-up imaging recommended. 2.  Normal uterus. No free pelvic fluid. No adnexal masses. 3.  No evidence of ovarian torsion     US DUP ABD PEL RETRO SCROT COMPLETE    Result Date: 11/30/2022  EXAMINATION: PELVIC ULTRASOUND; DOPPLER EVALUATION OF THE PELVIS 11/30/2022 TECHNIQUE: Transvaginal pelvic ultrasound duplex ultrasound using B-mode/gray scaled imaging, Doppler spectral analysis and color flow Doppler was obtained. COMPARISON: Ultrasound pelvis from November 24, 2022. CT abdomen and pelvis from November 26, 2022 HISTORY: ORDERING SYSTEM PROVIDED HISTORY: pain TECHNOLOGIST PROVIDED HISTORY: Reason for exam:->pain What reading provider will be dictating this exam?->CRC FINDINGS: Measurements: Uterus: 6.1 cm Endometrial stripe: 2-3 mm Right Ovary:2.0 x 1.7 x 3.5 cm Left Ovary: 3.1 x 2.6 x 1.7 cm Ultrasound Findings: Uterus: Uterus demonstrates normal myometrial echotexture. Endometrial stripe: Mildly heterogeneous appearance of the endometrium. No abnormal vascularity identified over the endometrium on color Doppler evaluation.   There is a small amount of fluid within the endometrial canal. Right Ovary: Right ovary is within normal limits. There is normal arterial and venous Doppler flow. No right adnexal mass. Left Ovary:  Left ovary is within normal limits. There is normal arterial and venous Doppler flow. No left adnexal mass. Free Fluid: Small amount of simple appearing fluid in the cul-de-sac. 1.  Mildly heterogeneous appearance of the endometrium. No abnormal vascularity identified over the endometrium on color Doppler evaluation (lowers suspicion for retained products of conception). There is a small amount of simple appearing fluid within the endometrial canal. 2.  Otherwise normal appearance of the uterus. 3.  Normal appearance of the bilateral ovaries. There are no adnexal masses. Normal ovarian vascularity. 4.  Trace free fluid identified in the cul-de-sac.       ------------------------- NURSING NOTES AND VITALS REVIEWED ---------------------------  Date / Time Roomed:  12/10/2022  2:01 PM  ED Bed Assignment:  30/30    The nursing notes within the ED encounter and vital signs as below have been reviewed. BP (!) 171/124   Pulse 93   Temp 97.4 °F (36.3 °C) (Tympanic)   Resp 16   Ht 5' 1\" (1.549 m)   Wt 118 lb (53.5 kg)   LMP  (LMP Unknown)   SpO2 99%   BMI 22.30 kg/m²   Oxygen Saturation Interpretation: Normal      ------------------------------------------ PROGRESS NOTES ------------------------------------------  Time: 0330  Re-evaluation. Patients symptoms show no change  Repeat physical examination is not changed      --------------------------------- ADDITIONAL PROVIDER NOTES ---------------------------------  This patient has eloped prior to completion of visit. Discharge Medication List as of 12/10/2022  7:33 PM          Diagnosis:  1. Abdominal pain, unspecified abdominal location    2. Pain, pelvic, female            Disposition:  Patient's disposition: Eloped  Patient's condition is serious.        Kelsy SPRINGER Andrew Wong MD  Resident  12/11/22 0131       Aga Escobar DO  12/11/22 1039

## 2022-12-10 NOTE — ED NOTES
This RN attempted to administer pain medication; Pt not in room; multiple attempts made to locate Pt; provider (DO CORY) notified.      Ricky , RN  12/10/22 1142

## 2022-12-12 LAB
C. TRACHOMATIS DNA ,URINE: NEGATIVE
N. GONORRHOEAE DNA, URINE: NEGATIVE
SOURCE: NORMAL

## 2022-12-13 DIAGNOSIS — E10.65 TYPE 1 DIABETES MELLITUS WITH HYPERGLYCEMIA (HCC): Primary | ICD-10-CM

## 2022-12-13 RX ORDER — INSULIN PMP CART,AUT,G6/7,CNTR
1 EACH SUBCUTANEOUS
Qty: 10 EACH | Refills: 11 | Status: SHIPPED | OUTPATIENT
Start: 2022-12-13

## 2022-12-13 RX ORDER — INSULIN PMP CART,AUT,G6/7,CNTR
EACH SUBCUTANEOUS
Qty: 1 KIT | Refills: 0 | Status: SHIPPED | OUTPATIENT
Start: 2022-12-13

## 2022-12-16 ENCOUNTER — HOSPITAL ENCOUNTER (EMERGENCY)
Age: 27
Discharge: HOME | End: 2022-12-16
Attending: EMERGENCY MEDICINE
Payer: COMMERCIAL

## 2022-12-16 ENCOUNTER — APPOINTMENT (OUTPATIENT)
Dept: GENERAL RADIOLOGY | Age: 27
End: 2022-12-16
Payer: COMMERCIAL

## 2022-12-16 VITALS
SYSTOLIC BLOOD PRESSURE: 144 MMHG | OXYGEN SATURATION: 99 % | TEMPERATURE: 98.6 F | HEART RATE: 92 BPM | DIASTOLIC BLOOD PRESSURE: 112 MMHG | RESPIRATION RATE: 16 BRPM

## 2022-12-16 DIAGNOSIS — R10.84 GENERALIZED ABDOMINAL PAIN: Primary | ICD-10-CM

## 2022-12-16 LAB
ALBUMIN SERPL-MCNC: 4.1 G/DL (ref 3.5–5.2)
ALP BLD-CCNC: 73 U/L (ref 35–104)
ALT SERPL-CCNC: 19 U/L (ref 0–32)
ANION GAP SERPL CALCULATED.3IONS-SCNC: 14 MMOL/L (ref 7–16)
AST SERPL-CCNC: 28 U/L (ref 0–31)
BACTERIA: ABNORMAL /HPF
BASOPHILS ABSOLUTE: 0.04 E9/L (ref 0–0.2)
BASOPHILS RELATIVE PERCENT: 0.5 % (ref 0–2)
BETA-HYDROXYBUTYRATE: 0.74 MMOL/L (ref 0.02–0.27)
BILIRUB SERPL-MCNC: 0.3 MG/DL (ref 0–1.2)
BILIRUBIN URINE: NEGATIVE
BLOOD, URINE: NEGATIVE
BUN BLDV-MCNC: 8 MG/DL (ref 6–20)
CALCIUM SERPL-MCNC: 9.4 MG/DL (ref 8.6–10.2)
CHLORIDE BLD-SCNC: 101 MMOL/L (ref 98–107)
CLARITY: CLEAR
CO2: 22 MMOL/L (ref 22–29)
COLOR: YELLOW
CREAT SERPL-MCNC: 0.5 MG/DL (ref 0.5–1)
EOSINOPHILS ABSOLUTE: 0.1 E9/L (ref 0.05–0.5)
EOSINOPHILS RELATIVE PERCENT: 1.3 % (ref 0–6)
EPITHELIAL CELLS, UA: ABNORMAL /HPF
GFR SERPL CREATININE-BSD FRML MDRD: >60 ML/MIN/1.73
GLUCOSE BLD-MCNC: 336 MG/DL (ref 74–99)
GLUCOSE URINE: 500 MG/DL
HCG, URINE, POC: NEGATIVE
HCT VFR BLD CALC: 41.8 % (ref 34–48)
HEMOGLOBIN: 13.9 G/DL (ref 11.5–15.5)
IMMATURE GRANULOCYTES #: 0.03 E9/L
IMMATURE GRANULOCYTES %: 0.4 % (ref 0–5)
KETONES, URINE: NEGATIVE MG/DL
LACTIC ACID: 1.8 MMOL/L (ref 0.5–2.2)
LEUKOCYTE ESTERASE, URINE: NEGATIVE
LIPASE: 19 U/L (ref 13–60)
LYMPHOCYTES ABSOLUTE: 2.85 E9/L (ref 1.5–4)
LYMPHOCYTES RELATIVE PERCENT: 38.4 % (ref 20–42)
Lab: NORMAL
MCH RBC QN AUTO: 26.6 PG (ref 26–35)
MCHC RBC AUTO-ENTMCNC: 33.3 % (ref 32–34.5)
MCV RBC AUTO: 80.1 FL (ref 80–99.9)
METER GLUCOSE: 283 MG/DL (ref 74–99)
MONOCYTES ABSOLUTE: 0.3 E9/L (ref 0.1–0.95)
MONOCYTES RELATIVE PERCENT: 4 % (ref 2–12)
NEGATIVE QC PASS/FAIL: NORMAL
NEUTROPHILS ABSOLUTE: 4.11 E9/L (ref 1.8–7.3)
NEUTROPHILS RELATIVE PERCENT: 55.4 % (ref 43–80)
NITRITE, URINE: NEGATIVE
PDW BLD-RTO: 14.6 FL (ref 11.5–15)
PH UA: 6.5 (ref 5–9)
PH VENOUS: 7.34 (ref 7.35–7.45)
PLATELET # BLD: 341 E9/L (ref 130–450)
PMV BLD AUTO: 10.2 FL (ref 7–12)
POSITIVE QC PASS/FAIL: NORMAL
POTASSIUM SERPL-SCNC: 3.6 MMOL/L (ref 3.5–5)
PROTEIN UA: 100 MG/DL
RBC # BLD: 5.22 E12/L (ref 3.5–5.5)
RBC UA: ABNORMAL /HPF (ref 0–2)
SODIUM BLD-SCNC: 137 MMOL/L (ref 132–146)
SPECIFIC GRAVITY UA: 1.01 (ref 1–1.03)
TOTAL PROTEIN: 7.7 G/DL (ref 6.4–8.3)
UROBILINOGEN, URINE: 0.2 E.U./DL
WBC # BLD: 7.4 E9/L (ref 4.5–11.5)
WBC UA: ABNORMAL /HPF (ref 0–5)

## 2022-12-16 PROCEDURE — 85025 COMPLETE CBC W/AUTO DIFF WBC: CPT

## 2022-12-16 PROCEDURE — 80053 COMPREHEN METABOLIC PANEL: CPT

## 2022-12-16 PROCEDURE — 74022 RADEX COMPL AQT ABD SERIES: CPT

## 2022-12-16 PROCEDURE — 6360000002 HC RX W HCPCS: Performed by: EMERGENCY MEDICINE

## 2022-12-16 PROCEDURE — 82800 BLOOD PH: CPT

## 2022-12-16 PROCEDURE — 6370000000 HC RX 637 (ALT 250 FOR IP): Performed by: EMERGENCY MEDICINE

## 2022-12-16 PROCEDURE — 83690 ASSAY OF LIPASE: CPT

## 2022-12-16 PROCEDURE — 83605 ASSAY OF LACTIC ACID: CPT

## 2022-12-16 PROCEDURE — 96374 THER/PROPH/DIAG INJ IV PUSH: CPT

## 2022-12-16 PROCEDURE — 82962 GLUCOSE BLOOD TEST: CPT

## 2022-12-16 PROCEDURE — 81001 URINALYSIS AUTO W/SCOPE: CPT

## 2022-12-16 PROCEDURE — 82010 KETONE BODYS QUAN: CPT

## 2022-12-16 PROCEDURE — 99284 EMERGENCY DEPT VISIT MOD MDM: CPT

## 2022-12-16 PROCEDURE — 96375 TX/PRO/DX INJ NEW DRUG ADDON: CPT

## 2022-12-16 RX ORDER — SODIUM CHLORIDE 0.9 % (FLUSH) 0.9 %
10 SYRINGE (ML) INJECTION PRN
Status: DISCONTINUED | OUTPATIENT
Start: 2022-12-16 | End: 2022-12-16 | Stop reason: HOSPADM

## 2022-12-16 RX ORDER — KETOROLAC TROMETHAMINE 30 MG/ML
15 INJECTION, SOLUTION INTRAMUSCULAR; INTRAVENOUS ONCE
Status: COMPLETED | OUTPATIENT
Start: 2022-12-16 | End: 2022-12-16

## 2022-12-16 RX ADMIN — KETOROLAC TROMETHAMINE 15 MG: 30 INJECTION, SOLUTION INTRAMUSCULAR at 11:37

## 2022-12-16 RX ADMIN — INSULIN HUMAN 10 UNITS: 100 INJECTION, SOLUTION PARENTERAL at 13:48

## 2022-12-16 ASSESSMENT — ENCOUNTER SYMPTOMS
ABDOMINAL PAIN: 1
SHORTNESS OF BREATH: 0
SINUS PRESSURE: 0
EYE PAIN: 0
EYE DISCHARGE: 0
SORE THROAT: 0
EYE REDNESS: 0
COUGH: 0
ABDOMINAL DISTENTION: 0
VOMITING: 0
NAUSEA: 1
DIARRHEA: 1
BACK PAIN: 0
WHEEZING: 0

## 2022-12-16 NOTE — ED PROVIDER NOTES
Patient with history of type 1 diabetes. She reports over a months of persistent abdominal pain. She has been seen her several times for the same. She did follow-up with her gynecologist after ED testing demonstrated ovarian cyst.  Patient was placed on Flagyl by her gynecologist.  No definitive explanation for the patient's pain after her visit to OB. She has not followed up with her gastroenterologist as of yet. She had last seen the gastroenterologist about a year ago and diagnosed with gastroparesis. Abdominal Pain  Pain location:  Generalized  Pain quality: aching    Pain radiates to:  Does not radiate  Pain severity:  Moderate  Onset quality:  Gradual  Duration:  4 weeks  Timing:  Constant  Progression:  Worsening  Chronicity:  Chronic  Relieved by:  None tried  Worsened by:  Nothing  Ineffective treatments:  None tried  Associated symptoms: diarrhea and nausea    Associated symptoms: no chest pain, no chills, no cough, no dysuria, no fatigue, no fever, no shortness of breath, no sore throat and no vomiting       Review of Systems   Constitutional:  Negative for chills, fatigue and fever. HENT:  Negative for ear pain, sinus pressure and sore throat. Eyes:  Negative for pain, discharge and redness. Respiratory:  Negative for cough, shortness of breath and wheezing. Cardiovascular:  Negative for chest pain. Gastrointestinal:  Positive for abdominal pain, diarrhea and nausea. Negative for abdominal distention and vomiting. Genitourinary:  Negative for dysuria and frequency. Musculoskeletal:  Negative for arthralgias and back pain. Skin:  Negative for rash and wound. Neurological:  Negative for weakness and headaches. Hematological:  Negative for adenopathy. All other systems reviewed and are negative. Physical Exam  Vitals and nursing note reviewed. Constitutional:       Appearance: She is well-developed. HENT:      Head: Normocephalic and atraumatic.    Eyes: Pupils: Pupils are equal, round, and reactive to light. Cardiovascular:      Rate and Rhythm: Normal rate and regular rhythm. Heart sounds: Normal heart sounds. No murmur heard. Pulmonary:      Effort: Pulmonary effort is normal. No respiratory distress. Breath sounds: Normal breath sounds. No wheezing or rales. Abdominal:      General: Bowel sounds are normal.      Palpations: Abdomen is soft. Tenderness: There is generalized abdominal tenderness. There is right CVA tenderness. There is no guarding or rebound. Hernia: No hernia is present. Musculoskeletal:      Cervical back: Normal range of motion and neck supple. Skin:     General: Skin is warm and dry. Neurological:      Mental Status: She is alert and oriented to person, place, and time. Cranial Nerves: No cranial nerve deficit. Coordination: Coordination normal.        Procedures     MDM        2:30 PM EST  This time, patient states she wishes to leave. She states she feels better. She does not wish to stay for further treatment and evaluation. She does agree however to follow-up with her primary care physician Monday morning. She will return here should she have any worsening of her condition.    --------------------------------------------- PAST HISTORY ---------------------------------------------  Past Medical History:  has a past medical history of Bleeding at insertion site, Common femoral artery injury, right, initial encounter, Depressive disorder, Diabetic ketoacidosis ([facility] [address] DM type 1 (diabetes mellitus, type 1) ([facility] [address] Marijuana abuse, Non-compliance, and Trichimoniasis. Past Surgical History:  has a past surgical history that includes Abdomen surgery (N/A, 5/9/2019) and Bartholin gland cyst excision. Social History:  reports that she has never smoked. She has never used smokeless tobacco. She reports current drug use. Drug: Marijuana Diana Ege). She reports that she does not drink alcohol.     Family History: family history includes Diabetes in her maternal grandmother and paternal grandmother; Stroke in an other family member. The patients home medications have been reviewed. Allergies: Patient has no known allergies.     -------------------------------------------------- RESULTS -------------------------------------------------  Labs:  Results for orders placed or performed during the hospital encounter of 12/16/22   CBC with Auto Differential   Result Value Ref Range    WBC 7.4 4.5 - 11.5 E9/L    RBC 5.22 3.50 - 5.50 E12/L    Hemoglobin 13.9 11.5 - 15.5 g/dL    Hematocrit 41.8 34.0 - 48.0 %    MCV 80.1 80.0 - 99.9 fL    MCH 26.6 26.0 - 35.0 pg    MCHC 33.3 32.0 - 34.5 %    RDW 14.6 11.5 - 15.0 fL    Platelets 282 841 - 560 E9/L    MPV 10.2 7.0 - 12.0 fL    Neutrophils % 55.4 43.0 - 80.0 %    Immature Granulocytes % 0.4 0.0 - 5.0 %    Lymphocytes % 38.4 20.0 - 42.0 %    Monocytes % 4.0 2.0 - 12.0 %    Eosinophils % 1.3 0.0 - 6.0 %    Basophils % 0.5 0.0 - 2.0 %    Neutrophils Absolute 4.11 1.80 - 7.30 E9/L    Immature Granulocytes # 0.03 E9/L    Lymphocytes Absolute 2.85 1.50 - 4.00 E9/L    Monocytes Absolute 0.30 0.10 - 0.95 E9/L    Eosinophils Absolute 0.10 0.05 - 0.50 E9/L    Basophils Absolute 0.04 0.00 - 0.20 E9/L   Comprehensive Metabolic Panel   Result Value Ref Range    Sodium 137 132 - 146 mmol/L    Potassium 3.6 3.5 - 5.0 mmol/L    Chloride 101 98 - 107 mmol/L    CO2 22 22 - 29 mmol/L    Anion Gap 14 7 - 16 mmol/L    Glucose 336 (H) 74 - 99 mg/dL    BUN 8 6 - 20 mg/dL    Creatinine 0.5 0.5 - 1.0 mg/dL    Est, Glom Filt Rate >60 >=60 mL/min/1.73    Calcium 9.4 8.6 - 10.2 mg/dL    Total Protein 7.7 6.4 - 8.3 g/dL    Albumin 4.1 3.5 - 5.2 g/dL    Total Bilirubin 0.3 0.0 - 1.2 mg/dL    Alkaline Phosphatase 73 35 - 104 U/L    ALT 19 0 - 32 U/L    AST 28 0 - 31 U/L   Lipase   Result Value Ref Range    Lipase 19 13 - 60 U/L   Urinalysis   Result Value Ref Range    Color, UA Yellow Straw/Yellow Clarity, UA Clear Clear    Glucose, Ur 500 (A) Negative mg/dL    Bilirubin Urine Negative Negative    Ketones, Urine Negative Negative mg/dL    Specific Gravity, UA 1.015 1.005 - 1.030    Blood, Urine Negative Negative    pH, UA 6.5 5.0 - 9.0    Protein,  (A) Negative mg/dL    Urobilinogen, Urine 0.2 <2.0 E.U./dL    Nitrite, Urine Negative Negative    Leukocyte Esterase, Urine Negative Negative   Lactic Acid   Result Value Ref Range    Lactic Acid 1.8 0.5 - 2.2 mmol/L   Beta-Hydroxybutyrate   Result Value Ref Range    Beta-Hydroxybutyrate 0.74 (H) 0.02 - 0.27 mmol/L   pH, venous   Result Value Ref Range    pH, Christoph 7.34 (L) 7.35 - 7.45   Microscopic Urinalysis   Result Value Ref Range    WBC, UA 0-1 0 - 5 /HPF    RBC, UA NONE 0 - 2 /HPF    Epithelial Cells, UA MANY /HPF    Bacteria, UA MODERATE (A) None Seen /HPF   POC Pregnancy Urine Qual   Result Value Ref Range    HCG, Urine, POC Negative Negative    Lot Number [id number]     Positive QC Pass/Fail Pass     Negative QC Pass/Fail Pass    POCT Glucose   Result Value Ref Range    Meter Glucose 283 (H) 74 - 99 mg/dL       Radiology:  XR ACUTE ABD SERIES CHEST 1 VW   Final Result   No acute radiographic abnormality.             ------------------------- NURSING NOTES AND VITALS REVIEWED ---------------------------  Date / Time Roomed:  12/16/2022  9:08 AM  ED Bed Assignment:  20/20    The nursing notes within the ED encounter and vital signs as below have been reviewed. BP (!) 144/112   Pulse 96   Temp 98.6 °F (37 °C)   Resp 16   LMP  (LMP Unknown)   SpO2 98%   Oxygen Saturation Interpretation: Normal      ------------------------------------------ PROGRESS NOTES ------------------------------------------  2:33 PM EST  I have spoken with the patient and discussed todays results, in addition to providing specific details for the plan of care and counseling regarding the diagnosis and prognosis.   Their questions are answered at this time and they are agreeable with the plan. I discussed at length with them reasons for immediate return here for re evaluation. They will followup with their primary care physician by calling their office  today .      --------------------------------- ADDITIONAL PROVIDER NOTES ---------------------------------  At this time the patient is without objective evidence of an acute process requiring hospitalization or inpatient management. They have remained hemodynamically stable throughout their entire ED visit and are stable for discharge with outpatient follow-up. The plan has been discussed in detail and they are aware of the specific conditions for emergent return, as well as the importance of follow-up. New Prescriptions    No medications on file       Diagnosis:  1. Generalized abdominal pain        Disposition:  Patient's disposition: Discharge to home  Patient's condition is stable.        Laurinda Lanes, DO  12/16/22 1431

## 2023-01-26 ENCOUNTER — TELEPHONE (OUTPATIENT)
Dept: ENDOCRINOLOGY | Age: 28
End: 2023-01-26

## 2023-01-31 ENCOUNTER — HOSPITAL ENCOUNTER (EMERGENCY)
Age: 28
Discharge: HOME | End: 2023-01-31
Attending: EMERGENCY MEDICINE
Payer: COMMERCIAL

## 2023-01-31 ENCOUNTER — APPOINTMENT (OUTPATIENT)
Dept: CT IMAGING | Age: 28
End: 2023-01-31
Payer: COMMERCIAL

## 2023-01-31 VITALS
OXYGEN SATURATION: 100 % | HEART RATE: 102 BPM | DIASTOLIC BLOOD PRESSURE: 121 MMHG | BODY MASS INDEX: 22.28 KG/M2 | WEIGHT: 118 LBS | TEMPERATURE: 98.6 F | RESPIRATION RATE: 16 BRPM | HEIGHT: 61 IN | SYSTOLIC BLOOD PRESSURE: 168 MMHG

## 2023-01-31 DIAGNOSIS — R73.9 HYPERGLYCEMIA: ICD-10-CM
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
## 2023-01-31 LAB
ALBUMIN SERPL-MCNC: 3.8 G/DL (ref 3.5–5.2)
ALP BLD-CCNC: 88 U/L (ref 35–104)
ALT SERPL-CCNC: 16 U/L (ref 0–32)
ANION GAP SERPL CALCULATED.3IONS-SCNC: 12 MMOL/L (ref 7–16)
AST SERPL-CCNC: 19 U/L (ref 0–31)
BASOPHILS ABSOLUTE: 0.02 E9/L (ref 0–0.2)
BASOPHILS RELATIVE PERCENT: 0.3 % (ref 0–2)
BILIRUB SERPL-MCNC: 0.4 MG/DL (ref 0–1.2)
BUN BLDV-MCNC: 11 MG/DL (ref 6–20)
CALCIUM SERPL-MCNC: 9.6 MG/DL (ref 8.6–10.2)
CHLORIDE BLD-SCNC: 93 MMOL/L (ref 98–107)
CO2: 22 MMOL/L (ref 22–29)
CREAT SERPL-MCNC: 0.6 MG/DL (ref 0.5–1)
EOSINOPHILS ABSOLUTE: 0.03 E9/L (ref 0.05–0.5)
EOSINOPHILS RELATIVE PERCENT: 0.5 % (ref 0–6)
GFR SERPL CREATININE-BSD FRML MDRD: >60 ML/MIN/1.73
GLUCOSE BLD-MCNC: 598 MG/DL (ref 74–99)
HCG, URINE, POC: NEGATIVE
HCT VFR BLD CALC: 39.3 % (ref 34–48)
HEMOGLOBIN: 13.4 G/DL (ref 11.5–15.5)
IMMATURE GRANULOCYTES #: 0.02 E9/L
IMMATURE GRANULOCYTES %: 0.3 % (ref 0–5)
LYMPHOCYTES ABSOLUTE: 1.67 E9/L (ref 1.5–4)
LYMPHOCYTES RELATIVE PERCENT: 27.4 % (ref 20–42)
Lab: NORMAL
MCH RBC QN AUTO: 26.9 PG (ref 26–35)
MCHC RBC AUTO-ENTMCNC: 34.1 % (ref 32–34.5)
MCV RBC AUTO: 78.8 FL (ref 80–99.9)
MONOCYTES ABSOLUTE: 0.21 E9/L (ref 0.1–0.95)
MONOCYTES RELATIVE PERCENT: 3.4 % (ref 2–12)
NEGATIVE QC PASS/FAIL: NORMAL
NEUTROPHILS ABSOLUTE: 4.15 E9/L (ref 1.8–7.3)
NEUTROPHILS RELATIVE PERCENT: 68.1 % (ref 43–80)
PDW BLD-RTO: 13.5 FL (ref 11.5–15)
PLATELET # BLD: 275 E9/L (ref 130–450)
PMV BLD AUTO: 11.5 FL (ref 7–12)
POSITIVE QC PASS/FAIL: NORMAL
POTASSIUM REFLEX MAGNESIUM: 4.7 MMOL/L (ref 3.5–5)
RBC # BLD: 4.99 E12/L (ref 3.5–5.5)
SODIUM BLD-SCNC: 127 MMOL/L (ref 132–146)
TOTAL PROTEIN: 7.5 G/DL (ref 6.4–8.3)
WBC # BLD: 6.1 E9/L (ref 4.5–11.5)

## 2023-01-31 PROCEDURE — 80053 COMPREHEN METABOLIC PANEL: CPT

## 2023-01-31 PROCEDURE — 99283 EMERGENCY DEPT VISIT LOW MDM: CPT

## 2023-01-31 PROCEDURE — 85025 COMPLETE CBC W/AUTO DIFF WBC: CPT

## 2023-01-31 RX ORDER — SODIUM CHLORIDE 0.9 % (FLUSH) 0.9 %
10 SYRINGE (ML) INJECTION PRN
Status: DISCONTINUED | OUTPATIENT
Start: 2023-01-31 | End: 2023-01-31 | Stop reason: HOSPADM

## 2023-01-31 RX ORDER — 0.9 % SODIUM CHLORIDE 0.9 %
1000 INTRAVENOUS SOLUTION INTRAVENOUS ONCE
Status: DISCONTINUED | OUTPATIENT
Start: 2023-01-31 | End: 2023-01-31 | Stop reason: HOSPADM

## 2023-01-31 RX ORDER — ACETAMINOPHEN 650 MG/1
650 SUPPOSITORY RECTAL ONCE
Status: DISCONTINUED | OUTPATIENT
Start: 2023-01-31 | End: 2023-01-31 | Stop reason: HOSPADM

## 2023-01-31 ASSESSMENT — PAIN DESCRIPTION - LOCATION: LOCATION: BACK;NECK

## 2023-01-31 ASSESSMENT — PAIN - FUNCTIONAL ASSESSMENT: PAIN_FUNCTIONAL_ASSESSMENT: 0-10

## 2023-01-31 ASSESSMENT — PAIN SCALES - GENERAL: PAINLEVEL_OUTOF10: 6

## 2023-01-31 NOTE — DISCHARGE INSTRUCTIONS
Blood sugar of 598. Please return the emergency department if you have a worsening headache, worsening pain, develop nausea or vomiting.

## 2023-01-31 NOTE — Clinical Note
Amrita Harry was seen and treated in our emergency department on 1/31/2023. She may return to work on . If you have any questions or concerns, please don't hesitate to call.       Alton Cabrera MD
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
## 2023-02-16 LAB — DIABETIC RETINOPATHY: POSITIVE

## 2023-02-17 DIAGNOSIS — E10.65 TYPE 1 DIABETES MELLITUS WITH HYPERGLYCEMIA (HCC): ICD-10-CM

## 2023-02-19 RX ORDER — INSULIN GLARGINE 100 [IU]/ML
22 INJECTION, SOLUTION SUBCUTANEOUS EVERY MORNING
Qty: 15 ML | Refills: 5 | Status: SHIPPED | OUTPATIENT
Start: 2023-02-19

## 2023-03-06 ENCOUNTER — APPOINTMENT (OUTPATIENT)
Dept: GENERAL RADIOLOGY | Age: 28
End: 2023-03-06
Payer: COMMERCIAL

## 2023-03-06 ENCOUNTER — HOSPITAL ENCOUNTER (EMERGENCY)
Age: 28
Discharge: HOME | End: 2023-03-07
Attending: EMERGENCY MEDICINE
Payer: COMMERCIAL

## 2023-03-06 ENCOUNTER — APPOINTMENT (OUTPATIENT)
Dept: CT IMAGING | Age: 28
End: 2023-03-06
Payer: COMMERCIAL

## 2023-03-06 DIAGNOSIS — E86.0 DEHYDRATION: ICD-10-CM

## 2023-03-06 DIAGNOSIS — E10.65 HYPERGLYCEMIA DUE TO TYPE 1 DIABETES MELLITUS (HCC): Primary | ICD-10-CM

## 2023-03-06 DIAGNOSIS — I10 HYPERTENSION, UNSPECIFIED TYPE: ICD-10-CM

## 2023-03-06 LAB
ALBUMIN SERPL-MCNC: 4.1 G/DL (ref 3.5–5.2)
ALP BLD-CCNC: 87 U/L (ref 35–104)
ALT SERPL-CCNC: 21 U/L (ref 0–32)
ANION GAP SERPL CALCULATED.3IONS-SCNC: 14 MMOL/L (ref 7–16)
AST SERPL-CCNC: 35 U/L (ref 0–31)
BASOPHILS ABSOLUTE: 0.02 E9/L (ref 0–0.2)
BASOPHILS RELATIVE PERCENT: 0.4 % (ref 0–2)
BETA-HYDROXYBUTYRATE: 2.29 MMOL/L (ref 0.02–0.27)
BILIRUB SERPL-MCNC: 0.3 MG/DL (ref 0–1.2)
BUN BLDV-MCNC: 16 MG/DL (ref 6–20)
CALCIUM SERPL-MCNC: 9.1 MG/DL (ref 8.6–10.2)
CHLORIDE BLD-SCNC: 89 MMOL/L (ref 98–107)
CO2: 23 MMOL/L (ref 22–29)
CREAT SERPL-MCNC: 0.7 MG/DL (ref 0.5–1)
EOSINOPHILS ABSOLUTE: 0.04 E9/L (ref 0.05–0.5)
EOSINOPHILS RELATIVE PERCENT: 0.7 % (ref 0–6)
GFR SERPL CREATININE-BSD FRML MDRD: >60 ML/MIN/1.73
GLUCOSE BLD-MCNC: 646 MG/DL (ref 74–99)
HCT VFR BLD CALC: 38.9 % (ref 34–48)
HEMOGLOBIN: 12.9 G/DL (ref 11.5–15.5)
IMMATURE GRANULOCYTES #: 0.02 E9/L
IMMATURE GRANULOCYTES %: 0.4 % (ref 0–5)
LACTIC ACID: 1.4 MMOL/L (ref 0.5–2.2)
LYMPHOCYTES ABSOLUTE: 1.88 E9/L (ref 1.5–4)
LYMPHOCYTES RELATIVE PERCENT: 34.4 % (ref 20–42)
MCH RBC QN AUTO: 27.4 PG (ref 26–35)
MCHC RBC AUTO-ENTMCNC: 33.2 % (ref 32–34.5)
MCV RBC AUTO: 82.6 FL (ref 80–99.9)
METER GLUCOSE: >500 MG/DL (ref 74–99)
MONOCYTES ABSOLUTE: 0.23 E9/L (ref 0.1–0.95)
MONOCYTES RELATIVE PERCENT: 4.2 % (ref 2–12)
NEUTROPHILS ABSOLUTE: 3.27 E9/L (ref 1.8–7.3)
NEUTROPHILS RELATIVE PERCENT: 59.9 % (ref 43–80)
PDW BLD-RTO: 14.7 FL (ref 11.5–15)
PH VENOUS: 7.31 (ref 7.35–7.45)
PLATELET # BLD: 263 E9/L (ref 130–450)
PMV BLD AUTO: 11.1 FL (ref 7–12)
POTASSIUM REFLEX MAGNESIUM: 4.6 MMOL/L (ref 3.5–5)
RBC # BLD: 4.71 E12/L (ref 3.5–5.5)
SODIUM BLD-SCNC: 126 MMOL/L (ref 132–146)
TOTAL PROTEIN: 8.1 G/DL (ref 6.4–8.3)
TROPONIN, HIGH SENSITIVITY: 7 NG/L (ref 0–9)
WBC # BLD: 5.5 E9/L (ref 4.5–11.5)

## 2023-03-06 PROCEDURE — 80053 COMPREHEN METABOLIC PANEL: CPT

## 2023-03-06 PROCEDURE — 6370000000 HC RX 637 (ALT 250 FOR IP): Performed by: STUDENT IN AN ORGANIZED HEALTH CARE EDUCATION/TRAINING PROGRAM

## 2023-03-06 PROCEDURE — 84484 ASSAY OF TROPONIN QUANT: CPT

## 2023-03-06 PROCEDURE — 82010 KETONE BODYS QUAN: CPT

## 2023-03-06 PROCEDURE — 85025 COMPLETE CBC W/AUTO DIFF WBC: CPT

## 2023-03-06 PROCEDURE — 96360 HYDRATION IV INFUSION INIT: CPT

## 2023-03-06 PROCEDURE — 99285 EMERGENCY DEPT VISIT HI MDM: CPT

## 2023-03-06 PROCEDURE — 2580000003 HC RX 258: Performed by: STUDENT IN AN ORGANIZED HEALTH CARE EDUCATION/TRAINING PROGRAM

## 2023-03-06 PROCEDURE — 93005 ELECTROCARDIOGRAM TRACING: CPT | Performed by: STUDENT IN AN ORGANIZED HEALTH CARE EDUCATION/TRAINING PROGRAM

## 2023-03-06 PROCEDURE — 71045 X-RAY EXAM CHEST 1 VIEW: CPT

## 2023-03-06 PROCEDURE — 81001 URINALYSIS AUTO W/SCOPE: CPT

## 2023-03-06 PROCEDURE — 82800 BLOOD PH: CPT

## 2023-03-06 PROCEDURE — 83605 ASSAY OF LACTIC ACID: CPT

## 2023-03-06 RX ORDER — ACETAMINOPHEN 500 MG
1000 TABLET ORAL ONCE
Status: COMPLETED | OUTPATIENT
Start: 2023-03-06 | End: 2023-03-06

## 2023-03-06 RX ORDER — 0.9 % SODIUM CHLORIDE 0.9 %
1000 INTRAVENOUS SOLUTION INTRAVENOUS ONCE
Status: COMPLETED | OUTPATIENT
Start: 2023-03-06 | End: 2023-03-07

## 2023-03-06 RX ADMIN — ACETAMINOPHEN 1000 MG: 500 TABLET ORAL at 22:56

## 2023-03-06 RX ADMIN — SODIUM CHLORIDE 1000 ML: 9 INJECTION, SOLUTION INTRAVENOUS at 22:50

## 2023-03-06 ASSESSMENT — PAIN DESCRIPTION - ORIENTATION
ORIENTATION: LEFT
ORIENTATION: LEFT

## 2023-03-06 ASSESSMENT — PAIN SCALES - GENERAL
PAINLEVEL_OUTOF10: 8
PAINLEVEL_OUTOF10: 7
PAINLEVEL_OUTOF10: 7

## 2023-03-06 ASSESSMENT — ENCOUNTER SYMPTOMS
BACK PAIN: 0
PHOTOPHOBIA: 0
SORE THROAT: 0
DIARRHEA: 0
SHORTNESS OF BREATH: 1
ABDOMINAL PAIN: 0
COUGH: 0
NAUSEA: 0

## 2023-03-06 ASSESSMENT — PAIN - FUNCTIONAL ASSESSMENT
PAIN_FUNCTIONAL_ASSESSMENT: 0-10
PAIN_FUNCTIONAL_ASSESSMENT: 0-10

## 2023-03-06 ASSESSMENT — PAIN DESCRIPTION - LOCATION
LOCATION: HEAD
LOCATION: HEAD
LOCATION: HEAD;EYE

## 2023-03-06 ASSESSMENT — PAIN DESCRIPTION - DESCRIPTORS
DESCRIPTORS: ACHING
DESCRIPTORS: ACHING

## 2023-03-06 NOTE — LETTER
[facility] Emergency Department  [address] [place] [address]  Phone: [phone]               March 7, 2023    Patient: Lino Pruitt   YOB: 1995   Date of Visit: 3/6/2023       To Whom It May Concern:    Dank Garsia was seen and treated in our emergency department on 3/6/2023. She may return to work/school on 3/8/2023.       Sincerely,       Tone Connor RN         Signature:__________________________________

## 2023-03-07 ENCOUNTER — APPOINTMENT (OUTPATIENT)
Dept: CT IMAGING | Age: 28
End: 2023-03-07
Payer: COMMERCIAL

## 2023-03-07 VITALS
OXYGEN SATURATION: 100 % | TEMPERATURE: 97.8 F | RESPIRATION RATE: 12 BRPM | WEIGHT: 110 LBS | BODY MASS INDEX: 20.77 KG/M2 | HEART RATE: 95 BPM | SYSTOLIC BLOOD PRESSURE: 161 MMHG | HEIGHT: 61 IN | DIASTOLIC BLOOD PRESSURE: 113 MMHG

## 2023-03-07 LAB
BACTERIA: ABNORMAL /HPF
BILIRUBIN URINE: NEGATIVE
BLOOD, URINE: NEGATIVE
CLARITY: CLEAR
COLOR: YELLOW
EKG ATRIAL RATE: 100 BPM
EKG P AXIS: 73 DEGREES
EKG P-R INTERVAL: 160 MS
EKG Q-T INTERVAL: 342 MS
EKG QRS DURATION: 70 MS
EKG QTC CALCULATION (BAZETT): 441 MS
EKG R AXIS: 65 DEGREES
EKG T AXIS: 64 DEGREES
EKG VENTRICULAR RATE: 100 BPM
EPITHELIAL CELLS, UA: ABNORMAL /HPF
GLUCOSE URINE: >=1000 MG/DL
HCG, URINE, POC: NEGATIVE
KETONES, URINE: ABNORMAL MG/DL
LEUKOCYTE ESTERASE, URINE: NEGATIVE
Lab: NORMAL
METER GLUCOSE: 388 MG/DL (ref 74–99)
NEGATIVE QC PASS/FAIL: NORMAL
NITRITE, URINE: NEGATIVE
PH UA: 6.5 (ref 5–9)
POSITIVE QC PASS/FAIL: NORMAL
PROTEIN UA: NEGATIVE MG/DL
RBC UA: ABNORMAL /HPF (ref 0–2)
SPECIFIC GRAVITY UA: 1.01 (ref 1–1.03)
UROBILINOGEN, URINE: 0.2 E.U./DL
WBC UA: ABNORMAL /HPF (ref 0–5)

## 2023-03-07 PROCEDURE — 72125 CT NECK SPINE W/O DYE: CPT

## 2023-03-07 PROCEDURE — 2580000003 HC RX 258: Performed by: STUDENT IN AN ORGANIZED HEALTH CARE EDUCATION/TRAINING PROGRAM

## 2023-03-07 PROCEDURE — 96361 HYDRATE IV INFUSION ADD-ON: CPT

## 2023-03-07 PROCEDURE — 82962 GLUCOSE BLOOD TEST: CPT

## 2023-03-07 PROCEDURE — 96374 THER/PROPH/DIAG INJ IV PUSH: CPT

## 2023-03-07 PROCEDURE — 70450 CT HEAD/BRAIN W/O DYE: CPT

## 2023-03-07 PROCEDURE — 6370000000 HC RX 637 (ALT 250 FOR IP): Performed by: STUDENT IN AN ORGANIZED HEALTH CARE EDUCATION/TRAINING PROGRAM

## 2023-03-07 PROCEDURE — 93010 ELECTROCARDIOGRAM REPORT: CPT | Performed by: INTERNAL MEDICINE

## 2023-03-07 RX ORDER — 0.9 % SODIUM CHLORIDE 0.9 %
1000 INTRAVENOUS SOLUTION INTRAVENOUS ONCE
Status: COMPLETED | OUTPATIENT
Start: 2023-03-07 | End: 2023-03-07

## 2023-03-07 RX ORDER — AMLODIPINE BESYLATE 5 MG/1
5 TABLET ORAL DAILY
Qty: 30 TABLET | Refills: 0 | Status: SHIPPED | OUTPATIENT
Start: 2023-03-07

## 2023-03-07 RX ORDER — DEXTROSE MONOHYDRATE 100 MG/ML
INJECTION, SOLUTION INTRAVENOUS CONTINUOUS PRN
Status: DISCONTINUED | OUTPATIENT
Start: 2023-03-07 | End: 2023-03-07 | Stop reason: HOSPADM

## 2023-03-07 RX ORDER — AMLODIPINE BESYLATE 5 MG/1
5 TABLET ORAL DAILY
Status: DISCONTINUED | OUTPATIENT
Start: 2023-03-07 | End: 2023-03-07 | Stop reason: HOSPADM

## 2023-03-07 RX ADMIN — AMLODIPINE BESYLATE 5 MG: 5 TABLET ORAL at 02:42

## 2023-03-07 RX ADMIN — SODIUM CHLORIDE 1000 ML: 9 INJECTION, SOLUTION INTRAVENOUS at 01:14

## 2023-03-07 RX ADMIN — INSULIN HUMAN 8 UNITS: 100 INJECTION, SOLUTION PARENTERAL at 01:11

## 2023-03-07 ASSESSMENT — PAIN - FUNCTIONAL ASSESSMENT
PAIN_FUNCTIONAL_ASSESSMENT: NONE - DENIES PAIN
PAIN_FUNCTIONAL_ASSESSMENT: NONE - DENIES PAIN

## 2023-03-07 NOTE — ED NOTES
NSS 1000cc IV bolus completed: repeat glucose after fluids and Insulin IV is 388. Patient states Headache is gone and wants to eat.      Angelia Hughes RN  03/07/23 8862

## 2023-03-07 NOTE — ED PROVIDER NOTES
HPI  24 y.o. female presenting for for dizziness, syncopal episode, headache. Patient states that last night she got up and went to the door when she passed out, hitting her head on the door and on the floor. She did not have any urinary incontinence. She states for the past couple months, she has been getting lightheaded and has ringing in her ears. She complains of feeling short of breath with lightheadedness when she stands up. She was seen by her PCP who recommended she get her blood sugars under control. No recent illnesses or chest pain.      --------------------------------------------- PAST HISTORY ---------------------------------------------  Past Medical History:  has a past medical history of Bleeding at insertion site, Common femoral artery injury, right, initial encounter, Depressive disorder, Diabetic ketoacidosis ([facility] [address] DM type 1 (diabetes mellitus, type 1) ([facility] [address] Marijuana abuse, Non-compliance, and Trichimoniasis. Past Surgical History:  has a past surgical history that includes Abdomen surgery (N/A, 5/9/2019) and Bartholin gland cyst excision. Social History:  reports that she has never smoked. She has never used smokeless tobacco. She reports current drug use. Drug: Marijuana Tom Beto). She reports that she does not drink alcohol. Family History: family history includes Diabetes in her maternal grandmother and paternal grandmother; Stroke in an other family member. The patients home medications have been reviewed. Allergies: Patient has no known allergies. Review of Systems   Constitutional:  Negative for chills and fever. HENT:  Negative for congestion and sore throat. Eyes:  Negative for photophobia and visual disturbance. Respiratory:  Positive for shortness of breath. Negative for cough. Cardiovascular:  Negative for chest pain and leg swelling. Gastrointestinal:  Negative for abdominal pain, diarrhea and nausea.    Genitourinary:  Negative for dysuria and flank pain. Musculoskeletal:  Negative for back pain and myalgias. Skin:  Negative for rash and wound. Neurological:  Positive for syncope, light-headedness and headaches. Negative for dizziness. Physical Exam  Constitutional:       General: She is not in acute distress. Appearance: Normal appearance. She is not ill-appearing. HENT:      Head: Normocephalic and atraumatic. Right Ear: External ear normal.      Left Ear: External ear normal.      Nose: Nose normal.   Eyes:      Conjunctiva/sclera: Conjunctivae normal.   Cardiovascular:      Rate and Rhythm: Normal rate and regular rhythm. Pulmonary:      Effort: Pulmonary effort is normal. No respiratory distress. Breath sounds: Normal breath sounds. No stridor. No wheezing, rhonchi or rales. Abdominal:      General: There is no distension. Palpations: Abdomen is soft. Tenderness: There is no abdominal tenderness. Musculoskeletal:         General: No swelling or deformity. Skin:     General: Skin is warm and dry. Neurological:      General: No focal deficit present. Mental Status: She is alert.    Psychiatric:         Mood and Affect: Mood normal.        Procedures      ED Course as of 03/07/23 0304   Tue Mar 07, 2023   0005 Glucose, Random(!!): 646 [AP]   0005 Anion Gap: 14 [AP]   0005 CO2: 23 [AP]   0005 Beta-Hydroxybutyrate(!): 2.29 [AP]   0005 pH, Christoph(!): 7.31 [AP]   0005 Troponin, High Sensitivity: 7 [AP]   0005 Lactic Acid: 1.4 [AP]      ED Course User Index  [AP] Abena Early MD       -------------------------------------------------- RESULTS -------------------------------------------------  Labs:  Results for orders placed or performed during the hospital encounter of 03/06/23   CBC with Auto Differential   Result Value Ref Range    WBC 5.5 4.5 - 11.5 E9/L    RBC 4.71 3.50 - 5.50 E12/L    Hemoglobin 12.9 11.5 - 15.5 g/dL    Hematocrit 38.9 34.0 - 48.0 %    MCV 82.6 80.0 - 99.9 fL    MCH 27.4 26.0 - 35.0 pg MCHC 33.2 32.0 - 34.5 %    RDW 14.7 11.5 - 15.0 fL    Platelets 404 360 - 905 E9/L    MPV 11.1 7.0 - 12.0 fL    Neutrophils % 59.9 43.0 - 80.0 %    Immature Granulocytes % 0.4 0.0 - 5.0 %    Lymphocytes % 34.4 20.0 - 42.0 %    Monocytes % 4.2 2.0 - 12.0 %    Eosinophils % 0.7 0.0 - 6.0 %    Basophils % 0.4 0.0 - 2.0 %    Neutrophils Absolute 3.27 1.80 - 7.30 E9/L    Immature Granulocytes # 0.02 E9/L    Lymphocytes Absolute 1.88 1.50 - 4.00 E9/L    Monocytes Absolute 0.23 0.10 - 0.95 E9/L    Eosinophils Absolute 0.04 (L) 0.05 - 0.50 E9/L    Basophils Absolute 0.02 0.00 - 0.20 E9/L   Comprehensive Metabolic Panel w/ Reflex to MG   Result Value Ref Range    Sodium 126 (L) 132 - 146 mmol/L    Potassium reflex Magnesium 4.6 3.5 - 5.0 mmol/L    Chloride 89 (L) 98 - 107 mmol/L    CO2 23 22 - 29 mmol/L    Anion Gap 14 7 - 16 mmol/L    Glucose 646 (HH) 74 - 99 mg/dL    BUN 16 6 - 20 mg/dL    Creatinine 0.7 0.5 - 1.0 mg/dL    Est, Glom Filt Rate >60 >=60 mL/min/1.73    Calcium 9.1 8.6 - 10.2 mg/dL    Total Protein 8.1 6.4 - 8.3 g/dL    Albumin 4.1 3.5 - 5.2 g/dL    Total Bilirubin 0.3 0.0 - 1.2 mg/dL    Alkaline Phosphatase 87 35 - 104 U/L    ALT 21 0 - 32 U/L    AST 35 (H) 0 - 31 U/L   Lactic Acid   Result Value Ref Range    Lactic Acid 1.4 0.5 - 2.2 mmol/L   Beta-Hydroxybutyrate   Result Value Ref Range    Beta-Hydroxybutyrate 2.29 (H) 0.02 - 0.27 mmol/L   PH, VENOUS   Result Value Ref Range    pH, Christoph 7.31 (L) 7.35 - 7.45   Urinalysis with Microscopic   Result Value Ref Range    Color, UA Yellow Straw/Yellow    Clarity, UA Clear Clear    Glucose, Ur >=1000 (A) Negative mg/dL    Bilirubin Urine Negative Negative    Ketones, Urine TRACE (A) Negative mg/dL    Specific Gravity, UA 1.010 1.005 - 1.030    Blood, Urine Negative Negative    pH, UA 6.5 5.0 - 9.0    Protein, UA Negative Negative mg/dL    Urobilinogen, Urine 0.2 <2.0 E.U./dL    Nitrite, Urine Negative Negative    Leukocyte Esterase, Urine Negative Negative    WBC, UA NONE 0 - 5 /HPF    RBC, UA NONE 0 - 2 /HPF    Epithelial Cells, UA FEW /HPF    Bacteria, UA NONE SEEN None Seen /HPF   Troponin   Result Value Ref Range    Troponin, High Sensitivity 7 0 - 9 ng/L   POCT Glucose   Result Value Ref Range    Meter Glucose >500 (H) 74 - 99 mg/dL   POC Pregnancy Urine Qual   Result Value Ref Range    HCG, Urine, POC Negative Negative    Lot Number [id number]     Positive QC Pass/Fail Pass     Negative QC Pass/Fail Pass    POCT Glucose   Result Value Ref Range    Meter Glucose 388 (H) 74 - 99 mg/dL   EKG 12 Lead   Result Value Ref Range    Ventricular Rate 100 BPM    Atrial Rate 100 BPM    P-R Interval 160 ms    QRS Duration 70 ms    Q-T Interval 342 ms    QTc Calculation (Bazett) 441 ms    P Axis 73 degrees    R Axis 65 degrees    T Axis 64 degrees       Radiology:  CT HEAD WO CONTRAST   Final Result   No acute intracranial abnormality.         CT CERVICAL SPINE WO CONTRAST   Final Result   No acute abnormality of the cervical spine.         XR CHEST PORTABLE   Final Result   No acute process.             ------------------------- NURSING NOTES AND VITALS REVIEWED ---------------------------  Date / Time Roomed:  3/6/2023 10:15 PM  ED Bed Assignment:  16/16    The nursing notes within the ED encounter and vital signs as below have been reviewed.   BP (!) 161/113   Pulse 95   Temp 97.8 °F (36.6 °C) (Oral)   Resp 12   Ht 5' 1\" (1.549 m)   Wt 110 lb (49.9 kg)   SpO2 100%   BMI 20.78 kg/m²   Oxygen Saturation Interpretation: Normal    Medical Decision Making  27-year-old female with history of type 1 diabetes presenting for syncopal episode.  Patient alert with no focal neurodeficits on evaluation.  Labs showed hyperglycemia, but normal anion gap and bicarb, with venous pH 7.3 slightly elevated beta hydroxybutyrate.  Doubt DKA at this time.  Labs otherwise reassuring.  CT head, cervical spine, chest x-ray unremarkable.  Patient was hypertensive, which did not improve while in the  ED.  On chart he, appears patient has been hypertensive on previous visits as well. Therefore, will start patient on Norvasc 5 mg daily until she can see her PCP. Patient was given fluids and insulin with downtrending of blood glucose. She is overall well-appearing and is felt stable for discharge. She will call her PCP tomorrow morning. Patient discharged home in stable condition. Differential diagnoses included but were not limited to: DKA, intracranial hemorrhage, arrhythmia, etc.    Patient received the following medications in the ED:  Medications  glucose chewable tablet 16 g (has no administration in time range)  dextrose bolus 10% 125 mL (has no administration in time range)    Or  dextrose bolus 10% 250 mL (has no administration in time range)  glucagon injection 1 mg (has no administration in time range)  dextrose 10 % infusion (has no administration in time range)  amLODIPine (NORVASC) tablet 5 mg (5 mg Oral Given 3/7/23 0242)  0.9 % sodium chloride bolus (0 mLs IntraVENous Stopped 3/7/23 0102)  acetaminophen (TYLENOL) tablet 1,000 mg (1,000 mg Oral Given 3/6/23 2256)  0.9 % sodium chloride bolus (0 mLs IntraVENous Stopped 3/7/23 0211)  insulin regular (HUMULIN R;NOVOLIN R) injection 8 Units (8 Units IntraVENous Given 3/7/23 0111)      Amount and/or Complexity of Data Reviewed  Labs: ordered. Decision-making details documented in ED Course. Radiology: ordered and independent interpretation performed. Details: CXR- no focal infiltrates or pneumothorax  CT head- no acute intracranial hemorrhage  CT cervical spine- no obvious fracture  ECG/medicine tests: ordered and independent interpretation performed. Details: rate 100; NSR; normal axis; no acute ST elevations or depressions, no acute T wave changes, normal intervals    Risk  OTC drugs.   Prescription drug management.        ------------------------------------------ PROGRESS NOTES ------------------------------------------  I have spoken with the patient and discussed todays results, in addition to providing specific details for the plan of care and counseling regarding the diagnosis and prognosis. Their questions are answered at this time and they are agreeable with the plan. I discussed at length with them reasons for immediate return here for re evaluation. They will followup with PCP.      --------------------------------- ADDITIONAL PROVIDER NOTES ---------------------------------  At this time the patient is without objective evidence of an acute process requiring hospitalization or inpatient management. They have remained hemodynamically stable throughout their entire ED visit and are stable for discharge with outpatient follow-up. The plan has been discussed in detail and they are aware of the specific conditions for emergent return, as well as the importance of follow-up. Discharge Medication List as of 3/7/2023  2:34 AM        START taking these medications    Details   amLODIPine (NORVASC) 5 MG tablet Take 1 tablet by mouth daily, Disp-30 tablet, R-0Normal             Diagnosis:  1. Hyperglycemia due to type 1 diabetes mellitus ([facility] [address]    2. Hypertension, unspecified type    3. Dehydration        Disposition:  Patient's disposition: Discharge to home  Patient's condition is stable.        Wendy Lobo MD  Resident  03/07/23 2309

## 2023-03-07 NOTE — ED NOTES
Stated passed out yesterday morning: hit left side of head on door: has felt lightheaded, dizzy and ear ringing since passing out. Glucose after passing out was 230.       Deven Bates RN  03/06/23 1860

## 2023-03-07 NOTE — ED TRIAGE NOTES
Type 1 diabetic and reports sugars running high, +dizziness, syncopal episode yesterday.  +headache since yesterday

## 2023-04-10 NOTE — LETTER
[facility]) - Diabetes Education    10/24/2022    Re:     Buzz Warner  :  1995    Dear Dr. Bela Fish:    Thank you for referring your patient, Buzz Warner, to Diabetes Education. We were unable to provide the prescribed diabetes education due to the following reason:      []  They have not called us back after 3 phone attempts. [x]  They cancelled their scheduled appointment , Pt is not planning on following through with pregnancy  []  They did not show up for their appointment on:     []  They do not want to schedule at this time due to    []   Other: This letter is for your records.     If we can be of any further assistance with this patient, please contact us at:  Renny Walsh [address]:  [facility]:  Faustina Reed. 285:  [phone]        Sincerely,  [address] [facility] Diabetes Education Department  [employer] [facility] Program Azithromycin Pregnancy And Lactation Text: This medication is considered safe during pregnancy and is also secreted in breast milk.

## 2023-04-27 ENCOUNTER — NURSE TRIAGE (OUTPATIENT)
Dept: OTHER | Facility: CLINIC | Age: 28
End: 2023-04-27

## 2023-04-27 NOTE — TELEPHONE ENCOUNTER
Location of patient: [address] call from [employer] at [employer]; Patient with [employer] Complaint requesting to establish care with [facility]. Subjective: Caller states \"for a week sore throat, headache, productive cough and difficulty breathing\"     Current Symptoms: has a really bad cough, will cough up mucous  Will hurt in chest when coughing  Heavy headaches. Will get winded easily when walking  Cough is all day long    Onset: 1 week ago; gradual    Associated Symptoms: NA    Pain Severity:     Temperature: denies     What has been tried: Mucinex, ibuprofen, tylenol, tea    LMP: NA Pregnant: No    Recommended disposition: Go to [facility]    Care advice provided, patient verbalizes understanding; denies any other questions or concerns; instructed to call back for any new or worsening symptoms. Patient/Caller agrees with recommended disposition; writer provided warm transfer to [employer] at [employer] for appointment scheduling    Attention Provider: Thank you for allowing me to participate in the care of your patient. The patient was connected to triage in response to information provided to the [facility]. Please do not respond through this encounter as the response is not directed to a shared pool.     Reason for Disposition   MILD difficulty breathing (e.g., minimal/no SOB at rest, SOB with walking, pulse <100) and still present when not coughing    Protocols used: Cough-ADULT-OH

## 2023-05-30 VITALS
DIASTOLIC BLOOD PRESSURE: 90 MMHG | RESPIRATION RATE: 18 BRPM | HEART RATE: 110 BPM | WEIGHT: 106 LBS | HEIGHT: 61 IN | SYSTOLIC BLOOD PRESSURE: 125 MMHG | OXYGEN SATURATION: 100 % | TEMPERATURE: 98.8 F | BODY MASS INDEX: 20.01 KG/M2

## 2023-05-30 PROCEDURE — 93005 ELECTROCARDIOGRAM TRACING: CPT | Performed by: EMERGENCY MEDICINE

## 2023-05-30 PROCEDURE — 4500000002 HC ER NO CHARGE

## 2023-05-30 ASSESSMENT — PAIN DESCRIPTION - LOCATION: LOCATION: BACK

## 2023-05-30 ASSESSMENT — PAIN SCALES - GENERAL: PAINLEVEL_OUTOF10: 9

## 2023-05-30 ASSESSMENT — PAIN - FUNCTIONAL ASSESSMENT: PAIN_FUNCTIONAL_ASSESSMENT: 0-10

## 2023-05-31 ENCOUNTER — HOSPITAL ENCOUNTER (INPATIENT)
Age: 28
LOS: 2 days | Discharge: HOME | DRG: 420 | End: 2023-06-02
Attending: EMERGENCY MEDICINE | Admitting: INTERNAL MEDICINE
Payer: COMMERCIAL

## 2023-05-31 ENCOUNTER — HOSPITAL ENCOUNTER (EMERGENCY)
Age: 28
Discharge: LEFT BEFORE DISCHARGE | DRG: 420 | End: 2023-05-31
Payer: COMMERCIAL

## 2023-05-31 DIAGNOSIS — E10.10 DIABETIC KETOACIDOSIS WITHOUT COMA ASSOCIATED WITH TYPE 1 DIABETES MELLITUS (HCC): Primary | ICD-10-CM

## 2023-05-31 DIAGNOSIS — Z33.1 PREGNANCY, INCIDENTAL: ICD-10-CM

## 2023-05-31 PROBLEM — E10.11 DIABETIC KETOACIDOSIS WITH COMA ASSOCIATED WITH TYPE 1 DIABETES MELLITUS (HCC): Status: ACTIVE | Noted: 2023-05-31

## 2023-05-31 LAB
ALBUMIN SERPL-MCNC: 4.4 G/DL (ref 3.5–5.2)
ALP SERPL-CCNC: 90 U/L (ref 35–104)
ALT SERPL-CCNC: 24 U/L (ref 0–32)
ANION GAP SERPL CALCULATED.3IONS-SCNC: 20 MMOL/L (ref 7–16)
AST SERPL-CCNC: 28 U/L (ref 0–31)
BACTERIA URNS QL MICRO: NORMAL /HPF
BASOPHILS # BLD: 0.03 E9/L (ref 0–0.2)
BASOPHILS NFR BLD: 0.3 % (ref 0–2)
BETA-HYDROXYBUTYRATE: >4.5 MMOL/L (ref 0.02–0.27)
BILIRUB SERPL-MCNC: 0.6 MG/DL (ref 0–1.2)
BILIRUB UR QL STRIP: NEGATIVE
BUN SERPL-MCNC: 15 MG/DL (ref 6–20)
CALCIUM SERPL-MCNC: 9.5 MG/DL (ref 8.6–10.2)
CHLORIDE SERPL-SCNC: 88 MMOL/L (ref 98–107)
CLARITY UR: CLEAR
CO2 SERPL-SCNC: 15 MMOL/L (ref 22–29)
COLOR UR: YELLOW
CREAT SERPL-MCNC: 0.6 MG/DL (ref 0.5–1)
D DIMER: <200 NG/ML DDU
EKG ATRIAL RATE: 107 BPM
EKG P AXIS: 67 DEGREES
EKG P-R INTERVAL: 142 MS
EKG Q-T INTERVAL: 336 MS
EKG QRS DURATION: 60 MS
EKG QTC CALCULATION (BAZETT): 448 MS
EKG R AXIS: 64 DEGREES
EKG T AXIS: 41 DEGREES
EKG VENTRICULAR RATE: 107 BPM
EOSINOPHIL # BLD: 0.01 E9/L (ref 0.05–0.5)
EOSINOPHIL NFR BLD: 0.1 % (ref 0–6)
ERYTHROCYTE [DISTWIDTH] IN BLOOD BY AUTOMATED COUNT: 12.8 FL (ref 11.5–15)
GLUCOSE BLD-MCNC: 374 MG/DL
GLUCOSE SERPL-MCNC: 415 MG/DL (ref 74–99)
GLUCOSE UR STRIP-MCNC: >=1000 MG/DL
HCG, URINE, POC: POSITIVE
HCT VFR BLD AUTO: 39.8 % (ref 34–48)
HGB BLD-MCNC: 13.2 G/DL (ref 11.5–15.5)
HGB UR QL STRIP: NEGATIVE
IMM GRANULOCYTES # BLD: 0.04 E9/L
IMM GRANULOCYTES NFR BLD: 0.4 % (ref 0–5)
KETONES UR STRIP-MCNC: >=80 MG/DL
LACTATE BLDV-SCNC: 2 MMOL/L (ref 0.5–2.2)
LEUKOCYTE ESTERASE UR QL STRIP: NEGATIVE
LIPASE: 8 U/L (ref 13–60)
LYMPHOCYTES # BLD: 2.04 E9/L (ref 1.5–4)
LYMPHOCYTES NFR BLD: 18.9 % (ref 20–42)
Lab: NORMAL
MAGNESIUM SERPL-MCNC: 2 MG/DL (ref 1.6–2.6)
MCH RBC QN AUTO: 27.8 PG (ref 26–35)
MCHC RBC AUTO-ENTMCNC: 33.2 % (ref 32–34.5)
MCV RBC AUTO: 84 FL (ref 80–99.9)
METER GLUCOSE: 335 MG/DL (ref 74–99)
METER GLUCOSE: 374 MG/DL (ref 74–99)
MONOCYTES # BLD: 0.34 E9/L (ref 0.1–0.95)
MONOCYTES NFR BLD: 3.1 % (ref 2–12)
NEGATIVE QC PASS/FAIL: NORMAL
NEUTROPHILS # BLD: 8.36 E9/L (ref 1.8–7.3)
NEUTS SEG NFR BLD: 77.2 % (ref 43–80)
NITRITE UR QL STRIP: NEGATIVE
PH UR STRIP: 6 [PH] (ref 5–9)
PH VENOUS: 7.25 (ref 7.35–7.45)
PHOSPHATE SERPL-MCNC: 4 MG/DL (ref 2.5–4.5)
PLATELET # BLD AUTO: 306 E9/L (ref 130–450)
PMV BLD AUTO: 10.6 FL (ref 7–12)
POSITIVE QC PASS/FAIL: NORMAL
POTASSIUM SERPL-SCNC: 5.1 MMOL/L (ref 3.5–5)
PROT SERPL-MCNC: 8.4 G/DL (ref 6.4–8.3)
PROT UR STRIP-MCNC: ABNORMAL MG/DL
RBC # BLD AUTO: 4.74 E12/L (ref 3.5–5.5)
RBC #/AREA URNS HPF: NORMAL /HPF (ref 0–2)
SODIUM SERPL-SCNC: 123 MMOL/L (ref 132–146)
SP GR UR STRIP: 1.02 (ref 1–1.03)
UROBILINOGEN UR STRIP-ACNC: 0.2 E.U./DL
WBC # BLD: 10.8 E9/L (ref 4.5–11.5)
WBC #/AREA URNS HPF: NORMAL /HPF (ref 0–5)

## 2023-05-31 PROCEDURE — 82010 KETONE BODYS QUAN: CPT

## 2023-05-31 PROCEDURE — 80053 COMPREHEN METABOLIC PANEL: CPT

## 2023-05-31 PROCEDURE — 99285 EMERGENCY DEPT VISIT HI MDM: CPT

## 2023-05-31 PROCEDURE — 83735 ASSAY OF MAGNESIUM: CPT

## 2023-05-31 PROCEDURE — 81001 URINALYSIS AUTO W/SCOPE: CPT

## 2023-05-31 PROCEDURE — 96374 THER/PROPH/DIAG INJ IV PUSH: CPT

## 2023-05-31 PROCEDURE — 83605 ASSAY OF LACTIC ACID: CPT

## 2023-05-31 PROCEDURE — 84100 ASSAY OF PHOSPHORUS: CPT

## 2023-05-31 PROCEDURE — 6370000000 HC RX 637 (ALT 250 FOR IP): Performed by: EMERGENCY MEDICINE

## 2023-05-31 PROCEDURE — 96361 HYDRATE IV INFUSION ADD-ON: CPT

## 2023-05-31 PROCEDURE — 85025 COMPLETE CBC W/AUTO DIFF WBC: CPT

## 2023-05-31 PROCEDURE — 83690 ASSAY OF LIPASE: CPT

## 2023-05-31 PROCEDURE — 82962 GLUCOSE BLOOD TEST: CPT

## 2023-05-31 PROCEDURE — 82800 BLOOD PH: CPT

## 2023-05-31 PROCEDURE — 6370000000 HC RX 637 (ALT 250 FOR IP): Performed by: INTERNAL MEDICINE

## 2023-05-31 PROCEDURE — 87088 URINE BACTERIA CULTURE: CPT

## 2023-05-31 PROCEDURE — 2000000000 HC ICU R&B

## 2023-05-31 PROCEDURE — 93010 ELECTROCARDIOGRAM REPORT: CPT | Performed by: INTERNAL MEDICINE

## 2023-05-31 PROCEDURE — 2580000003 HC RX 258: Performed by: EMERGENCY MEDICINE

## 2023-05-31 PROCEDURE — 85378 FIBRIN DEGRADE SEMIQUANT: CPT

## 2023-05-31 RX ORDER — 0.9 % SODIUM CHLORIDE 0.9 %
1000 INTRAVENOUS SOLUTION INTRAVENOUS ONCE
Status: COMPLETED | OUTPATIENT
Start: 2023-05-31 | End: 2023-05-31

## 2023-05-31 RX ORDER — HYDROCODONE BITARTRATE AND ACETAMINOPHEN 5; 325 MG/1; MG/1
1 TABLET ORAL EVERY 6 HOURS PRN
Status: COMPLETED | OUTPATIENT
Start: 2023-05-31 | End: 2023-06-01

## 2023-05-31 RX ORDER — DEXTROSE AND SODIUM CHLORIDE 5; .45 G/100ML; G/100ML
INJECTION, SOLUTION INTRAVENOUS CONTINUOUS PRN
Status: DISCONTINUED | OUTPATIENT
Start: 2023-05-31 | End: 2023-06-02

## 2023-05-31 RX ORDER — POTASSIUM CHLORIDE 7.45 MG/ML
10 INJECTION INTRAVENOUS PRN
Status: DISCONTINUED | OUTPATIENT
Start: 2023-05-31 | End: 2023-06-01 | Stop reason: SDUPTHER

## 2023-05-31 RX ORDER — SODIUM CHLORIDE 0.9 % (FLUSH) 0.9 %
10 SYRINGE (ML) INJECTION PRN
Status: DISCONTINUED | OUTPATIENT
Start: 2023-05-31 | End: 2023-06-02 | Stop reason: HOSPADM

## 2023-05-31 RX ORDER — SODIUM CHLORIDE 9 MG/ML
INJECTION, SOLUTION INTRAVENOUS CONTINUOUS
Status: DISCONTINUED | OUTPATIENT
Start: 2023-05-31 | End: 2023-06-02

## 2023-05-31 RX ADMIN — SODIUM CHLORIDE 1000 ML: 9 INJECTION, SOLUTION INTRAVENOUS at 20:38

## 2023-05-31 RX ADMIN — INSULIN HUMAN 10 UNITS: 100 INJECTION, SOLUTION PARENTERAL at 21:41

## 2023-05-31 RX ADMIN — SODIUM CHLORIDE 1000 ML: 9 INJECTION, SOLUTION INTRAVENOUS at 21:30

## 2023-05-31 RX ADMIN — SODIUM CHLORIDE 5.5 UNITS/HR: 9 INJECTION, SOLUTION INTRAVENOUS at 23:36

## 2023-05-31 RX ADMIN — HYDROCODONE BITARTRATE AND ACETAMINOPHEN 1 TABLET: 5; 325 TABLET ORAL at 23:55

## 2023-05-31 ASSESSMENT — ENCOUNTER SYMPTOMS
EYE PAIN: 0
DIARRHEA: 0
BOWEL INCONTINENCE: 0
VOMITING: 0
EYE DISCHARGE: 0
ABDOMINAL PAIN: 0
SORE THROAT: 0
NAUSEA: 0
EYE REDNESS: 0
ABDOMINAL DISTENTION: 0
WHEEZING: 0
SINUS PRESSURE: 0
SHORTNESS OF BREATH: 0
BACK PAIN: 1
COUGH: 0

## 2023-05-31 ASSESSMENT — PAIN DESCRIPTION - LOCATION: LOCATION: BACK

## 2023-05-31 ASSESSMENT — PAIN SCALES - GENERAL
PAINLEVEL_OUTOF10: 10
PAINLEVEL_OUTOF10: 10

## 2023-05-31 ASSESSMENT — PAIN DESCRIPTION - ORIENTATION: ORIENTATION: LOWER;MID

## 2023-05-31 NOTE — ED PROVIDER NOTES
88 (L) 98 - 107 mmol/L    CO2 15 (L) 22 - 29 mmol/L    Anion Gap 20 (H) 7 - 16 mmol/L    Glucose 415 (H) 74 - 99 mg/dL    BUN 15 6 - 20 mg/dL    Creatinine 0.6 0.5 - 1.0 mg/dL    Est, Glom Filt Rate >60 >=60 mL/min/1.73    Calcium 9.5 8.6 - 10.2 mg/dL    Total Protein 8.4 (H) 6.4 - 8.3 g/dL    Albumin 4.4 3.5 - 5.2 g/dL    Total Bilirubin 0.6 0.0 - 1.2 mg/dL    Alkaline Phosphatase 90 35 - 104 U/L    ALT 24 0 - 32 U/L    AST 28 0 - 31 U/L   Lipase   Result Value Ref Range    Lipase 8 (L) 13 - 60 U/L   pH, venous   Result Value Ref Range    pH, Christoph 7.25 (L) 7.35 - 7.45   Beta-Hydroxybutyrate   Result Value Ref Range    Beta-Hydroxybutyrate >4.50 (H) 0.02 - 0.27 mmol/L   Lactic Acid   Result Value Ref Range    Lactic Acid 2.0 0.5 - 2.2 mmol/L   Urinalysis   Result Value Ref Range    Color, UA Yellow Straw/Yellow    Clarity, UA Clear Clear    Glucose, Ur >=1000 (A) Negative mg/dL    Bilirubin Urine Negative Negative    Ketones, Urine >=80 (A) Negative mg/dL    Specific Gravity, UA 1.020 1.005 - 1.030    Blood, Urine Negative Negative    pH, UA 6.0 5.0 - 9.0    Protein, UA TRACE Negative mg/dL    Urobilinogen, Urine 0.2 <2.0 E.U./dL    Nitrite, Urine Negative Negative    Leukocyte Esterase, Urine Negative Negative   D-Dimer, Quantitative   Result Value Ref Range    D-Dimer, Quant <200 ng/mL DDU   Microscopic Urinalysis   Result Value Ref Range    WBC, UA NONE 0 - 5 /HPF    RBC, UA NONE 0 - 2 /HPF    Bacteria, UA NONE SEEN None Seen /HPF   POCT Glucose   Result Value Ref Range    Glucose 374 mg/dL   POCT Glucose   Result Value Ref Range    Meter Glucose 374 (H) 74 - 99 mg/dL   POC Pregnancy Urine Qual   Result Value Ref Range    HCG, Urine, POC Positive Negative    Lot Number [id number]     Positive QC Pass/Fail Pass     Negative QC Pass/Fail Pass        RADIOLOGY:  No orders to display         ------------------------- NURSING NOTES AND VITALS REVIEWED ---------------------------  Date / Time Roomed:  5/31/2023  7:17 PM  ED

## 2023-05-31 NOTE — ED NOTES
Pt reports bilateral flank pain and sob that started yesterday. Denies any h/o kidney problems but is type 1 diabetic.      Hugh Ledezma RN  05/30/23 9593

## 2023-06-01 ENCOUNTER — APPOINTMENT (OUTPATIENT)
Dept: ULTRASOUND IMAGING | Age: 28
DRG: 420 | End: 2023-06-01
Payer: COMMERCIAL

## 2023-06-01 LAB
ANION GAP SERPL CALCULATED.3IONS-SCNC: 10 MMOL/L (ref 7–16)
ANION GAP SERPL CALCULATED.3IONS-SCNC: 12 MMOL/L (ref 7–16)
ANION GAP SERPL CALCULATED.3IONS-SCNC: 13 MMOL/L (ref 7–16)
ANION GAP SERPL CALCULATED.3IONS-SCNC: 9 MMOL/L (ref 7–16)
ANION GAP SERPL CALCULATED.3IONS-SCNC: 9 MMOL/L (ref 7–16)
B PARAP IS1001 DNA NPH QL NAA+NON-PROBE: NOT DETECTED
B PERT.PT PRMT NPH QL NAA+NON-PROBE: NOT DETECTED
BASOPHILS # BLD: 0.04 E9/L (ref 0–0.2)
BASOPHILS NFR BLD: 0.4 % (ref 0–2)
BUN SERPL-MCNC: 10 MG/DL (ref 6–20)
BUN SERPL-MCNC: 13 MG/DL (ref 6–20)
BUN SERPL-MCNC: 7 MG/DL (ref 6–20)
BUN SERPL-MCNC: 8 MG/DL (ref 6–20)
BUN SERPL-MCNC: 8 MG/DL (ref 6–20)
C PNEUM DNA NPH QL NAA+NON-PROBE: NOT DETECTED
CALCIUM SERPL-MCNC: 7.9 MG/DL (ref 8.6–10.2)
CALCIUM SERPL-MCNC: 8 MG/DL (ref 8.6–10.2)
CALCIUM SERPL-MCNC: 8 MG/DL (ref 8.6–10.2)
CALCIUM SERPL-MCNC: 8.2 MG/DL (ref 8.6–10.2)
CALCIUM SERPL-MCNC: 8.6 MG/DL (ref 8.6–10.2)
CHLORIDE SERPL-SCNC: 102 MMOL/L (ref 98–107)
CHLORIDE SERPL-SCNC: 103 MMOL/L (ref 98–107)
CHLORIDE SERPL-SCNC: 103 MMOL/L (ref 98–107)
CHLORIDE SERPL-SCNC: 104 MMOL/L (ref 98–107)
CHLORIDE SERPL-SCNC: 99 MMOL/L (ref 98–107)
CO2 SERPL-SCNC: 17 MMOL/L (ref 22–29)
CO2 SERPL-SCNC: 19 MMOL/L (ref 22–29)
CO2 SERPL-SCNC: 19 MMOL/L (ref 22–29)
CREAT SERPL-MCNC: 0.5 MG/DL (ref 0.5–1)
CREAT SERPL-MCNC: 0.6 MG/DL (ref 0.5–1)
EOSINOPHIL # BLD: 0.07 E9/L (ref 0.05–0.5)
EOSINOPHIL NFR BLD: 0.6 % (ref 0–6)
ERYTHROCYTE [DISTWIDTH] IN BLOOD BY AUTOMATED COUNT: 12.8 FL (ref 11.5–15)
FLUAV RNA NPH QL NAA+NON-PROBE: NOT DETECTED
FLUBV RNA NPH QL NAA+NON-PROBE: NOT DETECTED
GLUCOSE SERPL-MCNC: 163 MG/DL (ref 74–99)
GLUCOSE SERPL-MCNC: 205 MG/DL (ref 74–99)
GLUCOSE SERPL-MCNC: 260 MG/DL (ref 74–99)
GLUCOSE SERPL-MCNC: 262 MG/DL (ref 74–99)
GLUCOSE SERPL-MCNC: 281 MG/DL (ref 74–99)
GONADOTROPIN, CHORIONIC (HCG) QUANT: ABNORMAL MIU/ML
HADV DNA NPH QL NAA+NON-PROBE: NOT DETECTED
HCOV 229E RNA NPH QL NAA+NON-PROBE: NOT DETECTED
HCOV HKU1 RNA NPH QL NAA+NON-PROBE: NOT DETECTED
HCOV NL63 RNA NPH QL NAA+NON-PROBE: NOT DETECTED
HCOV OC43 RNA NPH QL NAA+NON-PROBE: NOT DETECTED
HCT VFR BLD AUTO: 29.4 % (ref 34–48)
HGB BLD-MCNC: 10.1 G/DL (ref 11.5–15.5)
HMPV RNA NPH QL NAA+NON-PROBE: NOT DETECTED
HPIV1 RNA NPH QL NAA+NON-PROBE: NOT DETECTED
HPIV2 RNA NPH QL NAA+NON-PROBE: NOT DETECTED
HPIV3 RNA NPH QL NAA+NON-PROBE: NOT DETECTED
HPIV4 RNA NPH QL NAA+NON-PROBE: NOT DETECTED
IMM GRANULOCYTES # BLD: 0.02 E9/L
IMM GRANULOCYTES NFR BLD: 0.2 % (ref 0–5)
LYMPHOCYTES # BLD: 3.75 E9/L (ref 1.5–4)
LYMPHOCYTES NFR BLD: 33.3 % (ref 20–42)
M PNEUMO DNA NPH QL NAA+NON-PROBE: NOT DETECTED
MAGNESIUM SERPL-MCNC: 1.8 MG/DL (ref 1.6–2.6)
MAGNESIUM SERPL-MCNC: 1.8 MG/DL (ref 1.6–2.6)
MCH RBC QN AUTO: 28.2 PG (ref 26–35)
MCHC RBC AUTO-ENTMCNC: 34.4 % (ref 32–34.5)
MCV RBC AUTO: 82.1 FL (ref 80–99.9)
METER GLUCOSE: 144 MG/DL (ref 74–99)
METER GLUCOSE: 145 MG/DL (ref 74–99)
METER GLUCOSE: 146 MG/DL (ref 74–99)
METER GLUCOSE: 153 MG/DL (ref 74–99)
METER GLUCOSE: 161 MG/DL (ref 74–99)
METER GLUCOSE: 173 MG/DL (ref 74–99)
METER GLUCOSE: 201 MG/DL (ref 74–99)
METER GLUCOSE: 213 MG/DL (ref 74–99)
METER GLUCOSE: 243 MG/DL (ref 74–99)
METER GLUCOSE: 269 MG/DL (ref 74–99)
METER GLUCOSE: 290 MG/DL (ref 74–99)
MONOCYTES # BLD: 0.74 E9/L (ref 0.1–0.95)
MONOCYTES NFR BLD: 6.6 % (ref 2–12)
NEUTROPHILS # BLD: 6.65 E9/L (ref 1.8–7.3)
NEUTS SEG NFR BLD: 58.9 % (ref 43–80)
PHOSPHATE SERPL-MCNC: 2.3 MG/DL (ref 2.5–4.5)
PHOSPHATE SERPL-MCNC: 3 MG/DL (ref 2.5–4.5)
PLATELET # BLD AUTO: 255 E9/L (ref 130–450)
PMV BLD AUTO: 10.2 FL (ref 7–12)
POTASSIUM SERPL-SCNC: 3.7 MMOL/L (ref 3.5–5)
POTASSIUM SERPL-SCNC: 3.8 MMOL/L (ref 3.5–5)
POTASSIUM SERPL-SCNC: 3.8 MMOL/L (ref 3.5–5)
POTASSIUM SERPL-SCNC: 3.9 MMOL/L (ref 3.5–5)
POTASSIUM SERPL-SCNC: 4 MMOL/L (ref 3.5–5)
PROCALCITONIN: 0.07 NG/ML (ref 0–0.08)
RBC # BLD AUTO: 3.58 E12/L (ref 3.5–5.5)
RSV RNA NPH QL NAA+NON-PROBE: NOT DETECTED
RV+EV RNA NPH QL NAA+NON-PROBE: NOT DETECTED
SARS-COV-2 RNA NPH QL NAA+NON-PROBE: NOT DETECTED
SODIUM SERPL-SCNC: 128 MMOL/L (ref 132–146)
SODIUM SERPL-SCNC: 129 MMOL/L (ref 132–146)
SODIUM SERPL-SCNC: 131 MMOL/L (ref 132–146)
SODIUM SERPL-SCNC: 132 MMOL/L (ref 132–146)
SODIUM SERPL-SCNC: 133 MMOL/L (ref 132–146)
WBC # BLD: 11.3 E9/L (ref 4.5–11.5)

## 2023-06-01 PROCEDURE — 82962 GLUCOSE BLOOD TEST: CPT

## 2023-06-01 PROCEDURE — 83735 ASSAY OF MAGNESIUM: CPT

## 2023-06-01 PROCEDURE — 85025 COMPLETE CBC W/AUTO DIFF WBC: CPT

## 2023-06-01 PROCEDURE — 6370000000 HC RX 637 (ALT 250 FOR IP): Performed by: INTERNAL MEDICINE

## 2023-06-01 PROCEDURE — 2000000000 HC ICU R&B

## 2023-06-01 PROCEDURE — 2580000003 HC RX 258: Performed by: INTERNAL MEDICINE

## 2023-06-01 PROCEDURE — 87081 CULTURE SCREEN ONLY: CPT

## 2023-06-01 PROCEDURE — 84702 CHORIONIC GONADOTROPIN TEST: CPT

## 2023-06-01 PROCEDURE — 76815 OB US LIMITED FETUS(S): CPT

## 2023-06-01 PROCEDURE — 80048 BASIC METABOLIC PNL TOTAL CA: CPT

## 2023-06-01 PROCEDURE — 84100 ASSAY OF PHOSPHORUS: CPT

## 2023-06-01 PROCEDURE — 93005 ELECTROCARDIOGRAM TRACING: CPT | Performed by: NURSE PRACTITIONER

## 2023-06-01 PROCEDURE — 99223 1ST HOSP IP/OBS HIGH 75: CPT | Performed by: INTERNAL MEDICINE

## 2023-06-01 PROCEDURE — 36415 COLL VENOUS BLD VENIPUNCTURE: CPT

## 2023-06-01 PROCEDURE — 2500000003 HC RX 250 WO HCPCS: Performed by: INTERNAL MEDICINE

## 2023-06-01 PROCEDURE — 84145 PROCALCITONIN (PCT): CPT

## 2023-06-01 PROCEDURE — 0202U NFCT DS 22 TRGT SARS-COV-2: CPT

## 2023-06-01 PROCEDURE — 87040 BLOOD CULTURE FOR BACTERIA: CPT

## 2023-06-01 RX ORDER — DEXTROSE AND SODIUM CHLORIDE 5; .45 G/100ML; G/100ML
INJECTION, SOLUTION INTRAVENOUS CONTINUOUS PRN
Status: DISCONTINUED | OUTPATIENT
Start: 2023-06-01 | End: 2023-06-02

## 2023-06-01 RX ORDER — ONDANSETRON 4 MG/1
4 TABLET, ORALLY DISINTEGRATING ORAL EVERY 8 HOURS PRN
Status: DISCONTINUED | OUTPATIENT
Start: 2023-06-01 | End: 2023-06-02 | Stop reason: HOSPADM

## 2023-06-01 RX ORDER — SODIUM CHLORIDE 9 MG/ML
INJECTION, SOLUTION INTRAVENOUS CONTINUOUS
Status: DISCONTINUED | OUTPATIENT
Start: 2023-06-01 | End: 2023-06-02

## 2023-06-01 RX ORDER — SODIUM CHLORIDE 0.9 % (FLUSH) 0.9 %
5-40 SYRINGE (ML) INJECTION EVERY 12 HOURS SCHEDULED
Status: DISCONTINUED | OUTPATIENT
Start: 2023-06-01 | End: 2023-06-02 | Stop reason: HOSPADM

## 2023-06-01 RX ORDER — SODIUM CHLORIDE 0.9 % (FLUSH) 0.9 %
5-40 SYRINGE (ML) INJECTION PRN
Status: DISCONTINUED | OUTPATIENT
Start: 2023-06-01 | End: 2023-06-02 | Stop reason: HOSPADM

## 2023-06-01 RX ORDER — POTASSIUM CHLORIDE 7.45 MG/ML
10 INJECTION INTRAVENOUS PRN
Status: DISCONTINUED | OUTPATIENT
Start: 2023-06-01 | End: 2023-06-02 | Stop reason: HOSPADM

## 2023-06-01 RX ORDER — POTASSIUM CHLORIDE 20 MEQ/1
40 TABLET, EXTENDED RELEASE ORAL ONCE
Status: COMPLETED | OUTPATIENT
Start: 2023-06-01 | End: 2023-06-01

## 2023-06-01 RX ORDER — MAGNESIUM SULFATE 1 G/100ML
1000 INJECTION INTRAVENOUS PRN
Status: DISCONTINUED | OUTPATIENT
Start: 2023-06-01 | End: 2023-06-02 | Stop reason: HOSPADM

## 2023-06-01 RX ORDER — SODIUM CHLORIDE 9 MG/ML
INJECTION, SOLUTION INTRAVENOUS PRN
Status: DISCONTINUED | OUTPATIENT
Start: 2023-06-01 | End: 2023-06-02 | Stop reason: HOSPADM

## 2023-06-01 RX ORDER — ENOXAPARIN SODIUM 100 MG/ML
30 INJECTION SUBCUTANEOUS DAILY
Status: DISCONTINUED | OUTPATIENT
Start: 2023-06-01 | End: 2023-06-02 | Stop reason: HOSPADM

## 2023-06-01 RX ORDER — INSULIN LISPRO 100 [IU]/ML
0-4 INJECTION, SOLUTION INTRAVENOUS; SUBCUTANEOUS NIGHTLY
Status: DISCONTINUED | OUTPATIENT
Start: 2023-06-01 | End: 2023-06-02 | Stop reason: HOSPADM

## 2023-06-01 RX ORDER — POLYETHYLENE GLYCOL 3350 17 G/17G
17 POWDER, FOR SOLUTION ORAL DAILY PRN
Status: DISCONTINUED | OUTPATIENT
Start: 2023-06-01 | End: 2023-06-02 | Stop reason: HOSPADM

## 2023-06-01 RX ORDER — ACETAMINOPHEN 325 MG/1
650 TABLET ORAL EVERY 6 HOURS PRN
Status: DISCONTINUED | OUTPATIENT
Start: 2023-06-01 | End: 2023-06-02 | Stop reason: HOSPADM

## 2023-06-01 RX ORDER — ACETAMINOPHEN 650 MG/1
650 SUPPOSITORY RECTAL EVERY 6 HOURS PRN
Status: DISCONTINUED | OUTPATIENT
Start: 2023-06-01 | End: 2023-06-02 | Stop reason: HOSPADM

## 2023-06-01 RX ORDER — ONDANSETRON 2 MG/ML
4 INJECTION INTRAMUSCULAR; INTRAVENOUS EVERY 6 HOURS PRN
Status: DISCONTINUED | OUTPATIENT
Start: 2023-06-01 | End: 2023-06-02 | Stop reason: HOSPADM

## 2023-06-01 RX ORDER — AMLODIPINE BESYLATE 5 MG/1
5 TABLET ORAL DAILY
Status: DISCONTINUED | OUTPATIENT
Start: 2023-06-01 | End: 2023-06-02 | Stop reason: HOSPADM

## 2023-06-01 RX ORDER — INSULIN LISPRO 100 [IU]/ML
0-8 INJECTION, SOLUTION INTRAVENOUS; SUBCUTANEOUS
Status: DISCONTINUED | OUTPATIENT
Start: 2023-06-01 | End: 2023-06-02 | Stop reason: HOSPADM

## 2023-06-01 RX ORDER — INSULIN GLARGINE 100 [IU]/ML
22 INJECTION, SOLUTION SUBCUTANEOUS DAILY
Status: DISCONTINUED | OUTPATIENT
Start: 2023-06-01 | End: 2023-06-02 | Stop reason: HOSPADM

## 2023-06-01 RX ADMIN — AMLODIPINE BESYLATE 5 MG: 5 TABLET ORAL at 08:51

## 2023-06-01 RX ADMIN — POTASSIUM CHLORIDE 40 MEQ: 1500 TABLET, EXTENDED RELEASE ORAL at 09:51

## 2023-06-01 RX ADMIN — DEXTROSE AND SODIUM CHLORIDE: 5; 450 INJECTION, SOLUTION INTRAVENOUS at 10:28

## 2023-06-01 RX ADMIN — INSULIN LISPRO 4 UNITS: 100 INJECTION, SOLUTION INTRAVENOUS; SUBCUTANEOUS at 16:36

## 2023-06-01 RX ADMIN — DEXTROSE AND SODIUM CHLORIDE: 5; 450 INJECTION, SOLUTION INTRAVENOUS at 03:04

## 2023-06-01 RX ADMIN — SODIUM CHLORIDE, PRESERVATIVE FREE 10 ML: 5 INJECTION INTRAVENOUS at 20:08

## 2023-06-01 RX ADMIN — HYDROCODONE BITARTRATE AND ACETAMINOPHEN 1 TABLET: 5; 325 TABLET ORAL at 05:27

## 2023-06-01 RX ADMIN — INSULIN GLARGINE 22 UNITS: 100 INJECTION, SOLUTION SUBCUTANEOUS at 09:51

## 2023-06-01 RX ADMIN — SODIUM PHOSPHATE, MONOBASIC, MONOHYDRATE AND SODIUM PHOSPHATE, DIBASIC, ANHYDROUS 10 MMOL: 276; 142 INJECTION, SOLUTION INTRAVENOUS at 04:15

## 2023-06-01 RX ADMIN — SODIUM CHLORIDE, PRESERVATIVE FREE 10 ML: 5 INJECTION INTRAVENOUS at 08:51

## 2023-06-01 RX ADMIN — INSULIN LISPRO 2 UNITS: 100 INJECTION, SOLUTION INTRAVENOUS; SUBCUTANEOUS at 11:24

## 2023-06-01 ASSESSMENT — PAIN DESCRIPTION - LOCATION
LOCATION: BACK
LOCATION: BACK

## 2023-06-01 ASSESSMENT — PAIN DESCRIPTION - DESCRIPTORS
DESCRIPTORS: ACHING
DESCRIPTORS: ACHING

## 2023-06-01 ASSESSMENT — PAIN SCALES - GENERAL
PAINLEVEL_OUTOF10: 10
PAINLEVEL_OUTOF10: 5
PAINLEVEL_OUTOF10: 0
PAINLEVEL_OUTOF10: 10
PAINLEVEL_OUTOF10: 0
PAINLEVEL_OUTOF10: 0

## 2023-06-01 ASSESSMENT — PAIN DESCRIPTION - PAIN TYPE: TYPE: ACUTE PAIN

## 2023-06-01 NOTE — CONSULTS
AM    SEGSPCT 58 09/29/2013 03:30 PM    METASPCT 1.0 04/29/2020 04:06 PM    LYMPHOPCT 18.9 05/31/2023 07:59 PM    MONOPCT 3.1 05/31/2023 07:59 PM    MYELOPCT 1.0 04/29/2020 04:06 PM    BASOPCT 0.3 05/31/2023 07:59 PM    MONOSABS 0.34 05/31/2023 07:59 PM    LYMPHSABS 2.04 05/31/2023 07:59 PM    EOSABS 0.01 05/31/2023 07:59 PM    BASOSABS 0.03 05/31/2023 07:59 PM       BMP   Lab Results   Component Value Date/Time     05/31/2023 07:59 PM    K 5.1 05/31/2023 07:59 PM    K 4.6 03/06/2023 10:30 PM    CL 88 05/31/2023 07:59 PM    CO2 15 05/31/2023 07:59 PM    BUN 15 05/31/2023 07:59 PM    CREATININE 0.6 05/31/2023 07:59 PM    GLUCOSE 415 05/31/2023 07:59 PM    CALCIUM 9.5 05/31/2023 07:59 PM       LFTS  Lab Results   Component Value Date/Time    ALKPHOS 90 05/31/2023 07:59 PM    ALT 24 05/31/2023 07:59 PM    AST 28 05/31/2023 07:59 PM    PROT 8.4 05/31/2023 07:59 PM    BILITOT 0.6 05/31/2023 07:59 PM    BILIDIR <0.2 12/08/2022 09:20 AM    IBILI see below 12/08/2022 09:20 AM    LABALBU 4.4 05/31/2023 07:59 PM       INR  No results for input(s): PROTIME, INR in the last 72 hours. APTT  No results for input(s): APTT in the last 72 hours. Lactic Acid  Lab Results   Component Value Date/Time    LACTA 2.0 05/31/2023 07:59 PM    LACTA 1.4 03/06/2023 10:30 PM    LACTA 1.8 12/16/2022 11:41 AM        BNP   No results for input(s): BNP in the last 72 hours. Cultures     No results for input(s): BC in the last 72 hours. No results for input(s): Terrie Garre in the last 72 hours. No results for input(s): LABURIN in the last 72 hours.       Radiology   SYSTEMS ASSESSMENT     Neuro   # No acute issues     Respiratory   # No acute issues     Cardiovascular   # Check EKG     Gastrointestinal   # NPO     Renal   # HAGMA  - AG 20 pH 7.25 bicarb 15   - Lactate normal  - Continue volume resuscitation  - No indication for bicarb at this time  - Trend metabolic panel     # Electrolyte abnormalities  - Hyponatremia - Likely in

## 2023-06-01 NOTE — ED NOTES
Verified with pharmacist and attending that patient can receive norco while being pregnant. Pharmacist advised maybe a dose or two will be okay, but not anything more.  Patient education done of potential risks of taking norco     Basim Paige RN  05/31/23 5629

## 2023-06-01 NOTE — CARE COORDINATION
Social work / Discharge Planning:         Social work met with patient for initial assessment and explained role. Patient lives with her grandmother and is independent. She has no history of HC and states that she has all of her necessary diabetic supplies. Patient states she is \"in between primary care physicians\"  and is in the process of establishing with a physician but could not recall the name. Patient denies having any discharge needs at this time.

## 2023-06-01 NOTE — ED NOTES
Patient requesting no IV potassium until next renal draw d/t pain. Patient educated on risks versus benefits receiving IV potassium. Patient verbalizes understanding of risks and benefits. MD aware of patient status.       Rhenda Essex, RN  05/31/23 8430

## 2023-06-01 NOTE — PATIENT CARE CONFERENCE
Intensive Care Daily Quality Rounding Checklist      ICU Team Members: Bedside Nurse, charge nurse, clinical pharmacist, Mayda Grimaldo, resident    ICU Day #: NUMBER: 2    Intubation Date:      Ventilator Day #:     Central Line Insertion Date:          Day #:         Indication:      Arterial Line Insertion Date:        Day #:     Temporary Hemodialysis Catheter Insertion Date:        Day #     DVT Prophylaxis: lovenox     GI Prophylaxis:    Bey Catheter Insertion Date:         Day #:       Indications:       Continued need (if yes, reason documented and discussed with physician):     Skin Issues/ Wounds and ordered treatment discussed on rounds: none     Goals/ Plans for the Day: labs, wean insulin drip as able, start diet

## 2023-06-01 NOTE — PROGRESS NOTES
Pt seen and examined by night physician who admitted pt earlier today. Chart reviewed and updated by nursing.

## 2023-06-01 NOTE — H&P
[facility] History and Physical          ASSESSMENT:      Principal Problem:    Diabetic ketoacidosis with coma associated with type 1 diabetes mellitus ([facility] [address]  Resolved Problems:    * No resolved hospital problems. *      PLAN:    1. DKA  Admit to ICU   Insulin gtt  IV fluids (NS, D5 1/2 NS) as per DKA protocol  Q4 hrs BMP   ACHS BG checks or more frequently as necessary    Transition to basal/bolus insulin when AG closed     2. Influenza infection  Reported by patient, time of illness > 1 week ago, afebrile, no indication for isolation nor antiviral treatment   I suspect this was the trigger for DKA     3. Hyperkalemia  Treatment of DKA as above   Monitor on subsequent BMP     4. Metabolic acidosis  No indication for bicarbonate     5. Hyponatremia  Artifactual due to hyperglycemia    6. Bilateral low back pain  UA without evidence of infection  Pain control prn      Code Status: full code   DVT prophylaxis: enoxaparin       CHIEF COMPLAINT:  back pain, concern for DKA     History of Present Illness:  She is a 24year old female with longstanding type 1 DM who presents to the hospital with hyperglycemia and back pain. Reports she has had similar symptoms in the past when developing DKA. Denies any lapse in insulin treatment nor changes recently. No other active medical issues, but does note that about 1 week or so ago she was diagnosed with the flu via nasal swab at an urgent care center. Since recovering from this she has not had any subsequent acute symptoms. Denies hematuria, pain radiating to groin, dysuria, or stone passage.      Informant(s) for H&P: patient     REVIEW OF SYSTEMS:  A comprehensive review of systems was negative except for: what is in the HPI      PMH:  Past Medical History:   Diagnosis Date    Bleeding at insertion site 01/05/2018    Common femoral artery injury, right, initial encounter 01/05/2018    Depressive disorder     Diabetic ketoacidosis ([facility] [address]     DM

## 2023-06-01 NOTE — ED NOTES
Gave nurse to nurse to [facility] in ICU.  Stated she will be down to get patient     Gema Wise RN  06/01/23 2103

## 2023-06-01 NOTE — PROGRESS NOTES
Consulted for diabetes education. Patient well-known to me from previous admissions. She states she has no new educational needs at this time. She has been eating healthier at home and self-monitoring her glucose levels, which have been ranging between 170-200 mg/dl. She voices compliance with her insulin regimen: Novolog sliding scale at meals, Lantus 22 units daily. She is very active working at a . She states she had influenza for over a month and believes that is what led to DKA. She has an Omnipod at home, but has not attended any pump training sessions for this, stating she doesn't like wires hooked to her. I reviewed with her again that the Omnipod does not have wires. She states she will consider using it. She states she just found out she is pregnant. She needs refills of Novolog pens, pen needles, and testing strips upon discharge. She follows with Dr. Kirke Mortimer.  She has our contact information should she be interested in attending outpatient diabetes education. -John Voss, RN, BSN, [employer]

## 2023-06-02 VITALS
HEIGHT: 61 IN | BODY MASS INDEX: 19.52 KG/M2 | RESPIRATION RATE: 14 BRPM | OXYGEN SATURATION: 97 % | WEIGHT: 103.4 LBS | HEART RATE: 108 BPM | TEMPERATURE: 98.2 F | DIASTOLIC BLOOD PRESSURE: 101 MMHG | SYSTOLIC BLOOD PRESSURE: 129 MMHG

## 2023-06-02 PROBLEM — E86.0 DEHYDRATION: Status: ACTIVE | Noted: 2023-06-02

## 2023-06-02 PROBLEM — I10 PRIMARY HYPERTENSION: Status: ACTIVE | Noted: 2023-06-02

## 2023-06-02 LAB
ANION GAP SERPL CALCULATED.3IONS-SCNC: 8 MMOL/L (ref 7–16)
BACTERIA BLD CULT ORG #2: NORMAL
BACTERIA BLD CULT: NORMAL
BASOPHILS # BLD: 0.03 E9/L (ref 0–0.2)
BASOPHILS NFR BLD: 0.4 % (ref 0–2)
BUN SERPL-MCNC: 9 MG/DL (ref 6–20)
CALCIUM SERPL-MCNC: 8.1 MG/DL (ref 8.6–10.2)
CHLORIDE SERPL-SCNC: 105 MMOL/L (ref 98–107)
CO2 SERPL-SCNC: 19 MMOL/L (ref 22–29)
CREAT SERPL-MCNC: 0.6 MG/DL (ref 0.5–1)
EKG ATRIAL RATE: 106 BPM
EKG P AXIS: 63 DEGREES
EKG P-R INTERVAL: 144 MS
EKG Q-T INTERVAL: 328 MS
EKG QRS DURATION: 62 MS
EKG QTC CALCULATION (BAZETT): 435 MS
EKG R AXIS: 54 DEGREES
EKG T AXIS: 59 DEGREES
EKG VENTRICULAR RATE: 106 BPM
EOSINOPHIL # BLD: 0.05 E9/L (ref 0.05–0.5)
EOSINOPHIL NFR BLD: 0.6 % (ref 0–6)
ERYTHROCYTE [DISTWIDTH] IN BLOOD BY AUTOMATED COUNT: 12.9 FL (ref 11.5–15)
GLUCOSE SERPL-MCNC: 346 MG/DL (ref 74–99)
HCT VFR BLD AUTO: 28 % (ref 34–48)
HGB BLD-MCNC: 9.6 G/DL (ref 11.5–15.5)
IMM GRANULOCYTES # BLD: 0.03 E9/L
IMM GRANULOCYTES NFR BLD: 0.4 % (ref 0–5)
LYMPHOCYTES # BLD: 3.58 E9/L (ref 1.5–4)
LYMPHOCYTES NFR BLD: 43.8 % (ref 20–42)
MAGNESIUM SERPL-MCNC: 1.9 MG/DL (ref 1.6–2.6)
MCH RBC QN AUTO: 28.7 PG (ref 26–35)
MCHC RBC AUTO-ENTMCNC: 34.3 % (ref 32–34.5)
MCV RBC AUTO: 83.8 FL (ref 80–99.9)
METER GLUCOSE: 285 MG/DL (ref 74–99)
METER GLUCOSE: 80 MG/DL (ref 74–99)
MONOCYTES # BLD: 0.39 E9/L (ref 0.1–0.95)
MONOCYTES NFR BLD: 4.8 % (ref 2–12)
MRSA SPEC QL CULT: NORMAL
NEUTROPHILS # BLD: 4.09 E9/L (ref 1.8–7.3)
NEUTS SEG NFR BLD: 50 % (ref 43–80)
PHOSPHATE SERPL-MCNC: 3.1 MG/DL (ref 2.5–4.5)
PLATELET # BLD AUTO: 219 E9/L (ref 130–450)
PMV BLD AUTO: 10.7 FL (ref 7–12)
POTASSIUM SERPL-SCNC: 4.1 MMOL/L (ref 3.5–5)
RBC # BLD AUTO: 3.34 E12/L (ref 3.5–5.5)
SODIUM SERPL-SCNC: 132 MMOL/L (ref 132–146)
WBC # BLD: 8.2 E9/L (ref 4.5–11.5)

## 2023-06-02 PROCEDURE — 83735 ASSAY OF MAGNESIUM: CPT

## 2023-06-02 PROCEDURE — 85025 COMPLETE CBC W/AUTO DIFF WBC: CPT

## 2023-06-02 PROCEDURE — 80048 BASIC METABOLIC PNL TOTAL CA: CPT

## 2023-06-02 PROCEDURE — 6370000000 HC RX 637 (ALT 250 FOR IP): Performed by: INTERNAL MEDICINE

## 2023-06-02 PROCEDURE — 6370000000 HC RX 637 (ALT 250 FOR IP)

## 2023-06-02 PROCEDURE — 36415 COLL VENOUS BLD VENIPUNCTURE: CPT

## 2023-06-02 PROCEDURE — 93010 ELECTROCARDIOGRAM REPORT: CPT | Performed by: INTERNAL MEDICINE

## 2023-06-02 PROCEDURE — 99239 HOSP IP/OBS DSCHRG MGMT >30: CPT | Performed by: INTERNAL MEDICINE

## 2023-06-02 PROCEDURE — 84100 ASSAY OF PHOSPHORUS: CPT

## 2023-06-02 PROCEDURE — 82962 GLUCOSE BLOOD TEST: CPT

## 2023-06-02 RX ORDER — HYDROCODONE BITARTRATE AND ACETAMINOPHEN 5; 325 MG/1; MG/1
1 TABLET ORAL ONCE
Status: COMPLETED | OUTPATIENT
Start: 2023-06-02 | End: 2023-06-02

## 2023-06-02 RX ADMIN — HYDROCODONE BITARTRATE AND ACETAMINOPHEN 1 TABLET: 5; 325 TABLET ORAL at 02:22

## 2023-06-02 RX ADMIN — INSULIN GLARGINE 22 UNITS: 100 INJECTION, SOLUTION SUBCUTANEOUS at 07:38

## 2023-06-02 RX ADMIN — INSULIN LISPRO 4 UNITS: 100 INJECTION, SOLUTION INTRAVENOUS; SUBCUTANEOUS at 07:38

## 2023-06-02 RX ADMIN — AMLODIPINE BESYLATE 5 MG: 5 TABLET ORAL at 08:57

## 2023-06-02 ASSESSMENT — PAIN DESCRIPTION - ORIENTATION
ORIENTATION: LOWER
ORIENTATION: LOWER

## 2023-06-02 ASSESSMENT — PAIN SCALES - GENERAL
PAINLEVEL_OUTOF10: 4
PAINLEVEL_OUTOF10: 0
PAINLEVEL_OUTOF10: 0
PAINLEVEL_OUTOF10: 8
PAINLEVEL_OUTOF10: 9
PAINLEVEL_OUTOF10: 2

## 2023-06-02 ASSESSMENT — PAIN DESCRIPTION - DESCRIPTORS: DESCRIPTORS: ACHING;CRAMPING;DULL

## 2023-06-02 ASSESSMENT — PAIN DESCRIPTION - LOCATION
LOCATION: BACK
LOCATION: BACK

## 2023-06-02 NOTE — PLAN OF CARE
Problem: Discharge Planning  Goal: Discharge to home or other facility with appropriate resources  6/2/2023 0018 by Karely Figueroa RN  Outcome: Progressing     Problem: Pain  Goal: Verbalizes/displays adequate comfort level or baseline comfort level  6/2/2023 0018 by Karely Figueroa RN  Outcome: Progressing     Problem: ABCDS Injury Assessment  Goal: Absence of physical injury  6/2/2023 0018 by Karely Figueroa RN  Outcome: Progressing

## 2023-06-02 NOTE — PATIENT CARE CONFERENCE
Intensive Care Daily Quality Rounding Checklist        ICU Team Members: Bedside Nurse, charge nurse, clinical pharmacist, Gene Saini, resident     ICU Day #: NUMBER: 3     Intubation Date:       Ventilator Day #:      Central Line Insertion Date:                                                      Day #:                                                     Indication:       Arterial Line Insertion Date:                               Day #:      Temporary Hemodialysis Catheter Insertion Date:                               Day #      DVT Prophylaxis: lovenox     GI Prophylaxis:     Bey Catheter Insertion Date:                                          Day #:                              Indications:                              Continued need (if yes, reason documented and discussed with physician):      Skin Issues/ Wounds and ordered treatment discussed on rounds: none      Goals/ Plans for the Day: labs, transfer

## 2023-06-02 NOTE — PROGRESS NOTES
Critical Care Team - Daily Progress Note      Date and time: 6/2/2023 9:15 AM  Patient's name:  Samantha Bowers  Medical Record Number: [id number]  Patient's account/billing number: [de-identified]  Patient's YOB: 1995  Age: 24 y.o. Date of Admission: 5/31/2023  7:17 PM  Length of stay during current admission: 2      Primary Care Physician: Amanda Jalloh MD  ICU Attending Physician: Dr. Davie Jaramillo Status: Full Code    Reason for ICU admission: DKA      SUBJECTIVE:     OVERNIGHT EVENTS:           6/2: No acute events overnight. Patient is awake and alert, sitting up in bed, and eating breakfast. Patient has no complaints and feels well. Patient was seen by Dr. Jeet Richardson yesterday and okay to follow up outpatient. Okay for D/C from ICU standpoint. Vasopressors/Drips: None  Awake and following commands: Yes  Current Ventilation: - No ventilator support  Secretions: None  Sedation: none  Paralyzed: No    ABG: none this AM; P:F if ARDS    ROS:  Unless stated above, ROS is otherwise negative. Initial HPI + past overnight events:     Ms. Ani Myers presented to the ED today with known history of type 1 diabetes utilizing a sliding scale for coverage currently on Lantus 22 units at night, humalog 1:10 + ss 1:50>150 as of her last visit with Dr. Tonia Wiseman in December. At that appointment arrangements being made for omnipod pump and dexcom. Her a1c has been consistently > 10. On 4/27 she reached out to [facility] with upper respiratory complaints and headache advised to report to the office for evaluation but it does not appear that she followed up. She presented to the ED yesterday but left the triage area without being seen. An EKG was done and negative for ACS. Today she developed aching back pain and light headedness that felt similar to previous episodes of DKA.   In the ED she was found to be hyperglycemic with blood glucose 415 AG 20 bicarb 15 pH 7.25 beta hydroxy > 4.5 and UA with

## 2023-06-02 NOTE — DISCHARGE SUMMARY
METASPCT 1.0 04/29/2020 04:06 PM    LYMPHOPCT 43.8 06/02/2023 06:25 AM    MONOPCT 4.8 06/02/2023 06:25 AM    MYELOPCT 1.0 04/29/2020 04:06 PM    BASOPCT 0.4 06/02/2023 06:25 AM    MONOSABS 0.39 06/02/2023 06:25 AM    LYMPHSABS 3.58 06/02/2023 06:25 AM    EOSABS 0.05 06/02/2023 06:25 AM    BASOSABS 0.03 06/02/2023 06:25 AM     BMP:    Lab Results   Component Value Date/Time     06/02/2023 06:25 AM    K 4.1 06/02/2023 06:25 AM     06/02/2023 06:25 AM    CO2 19 06/02/2023 06:25 AM    BUN 9 06/02/2023 06:25 AM    LABALBU 4.4 05/31/2023 07:59 PM    CREATININE 0.6 06/02/2023 06:25 AM    CALCIUM 8.1 06/02/2023 06:25 AM    GFRAA >60 10/05/2022 02:39 AM    LABGLOM >60 06/02/2023 06:25 AM    GLUCOSE 346 06/02/2023 06:25 AM     Magnesium:    Lab Results   Component Value Date/Time    MG 1.9 06/02/2023 06:25 AM     Phosphorus:    Lab Results   Component Value Date/Time    PHOS 3.1 06/02/2023 06:25 AM       Imaging:   US OB 1 OR MORE FETUS LIMITED   Final Result   Single live intrauterine pregnancy with an estimated gestational age of 10   weeks and 0 days. Patient Instructions:      Medication List        CONTINUE taking these medications      amLODIPine 5 MG tablet  Commonly known as: Norvasc  Take 1 tablet by mouth daily     BD Pen Needle Camila U/F 32G X 4 MM Misc  Generic drug: Insulin Pen Needle  Uses with insulin 4 times a day     blood glucose test strips  Freestyle Lite Strips. Checks 4 times/day before meals and at bedtime and as needed for symptoms of irregular blood glucose.      Dexcom G6 Sensor Misc  To change every 10 days     Dexcom G6 Transmitter Misc  To change every 90 days     glucose 4 g chewable tablet  Take 4 tablets by mouth as needed for Low blood sugar     Lantus SoloStar 100 UNIT/ML injection pen  Generic drug: insulin glargine  Inject 22 Units into the skin every morning     NovoLOG FlexPen 100 UNIT/ML injection pen  Generic drug: insulin aspart  Inject 6 units with meals +

## 2023-06-03 LAB — BACTERIA UR CULT: NORMAL

## 2023-06-06 ENCOUNTER — OFFICE VISIT (OUTPATIENT)
Dept: ENDOCRINOLOGY | Age: 28
End: 2023-06-06
Payer: COMMERCIAL

## 2023-06-06 VITALS
OXYGEN SATURATION: 100 % | SYSTOLIC BLOOD PRESSURE: 125 MMHG | WEIGHT: 106 LBS | DIASTOLIC BLOOD PRESSURE: 82 MMHG | HEART RATE: 105 BPM | BODY MASS INDEX: 20.03 KG/M2

## 2023-06-06 DIAGNOSIS — E10.65 TYPE 1 DIABETES MELLITUS WITH HYPERGLYCEMIA (HCC): Primary | ICD-10-CM

## 2023-06-06 DIAGNOSIS — Z3A.08 8 WEEKS GESTATION OF PREGNANCY: ICD-10-CM

## 2023-06-06 DIAGNOSIS — R80.9 MICROALBUMINURIA: ICD-10-CM

## 2023-06-06 DIAGNOSIS — E55.9 VITAMIN D DEFICIENCY: ICD-10-CM

## 2023-06-06 DIAGNOSIS — Z91.119 DIETARY NONCOMPLIANCE: ICD-10-CM

## 2023-06-06 LAB
BACTERIA BLD CULT ORG #2: NORMAL
BACTERIA BLD CULT: NORMAL
HBA1C MFR BLD: 13.7 %

## 2023-06-06 PROCEDURE — 2022F DILAT RTA XM EVC RTNOPTHY: CPT | Performed by: NURSE PRACTITIONER

## 2023-06-06 PROCEDURE — 83036 HEMOGLOBIN GLYCOSYLATED A1C: CPT | Performed by: NURSE PRACTITIONER

## 2023-06-06 PROCEDURE — 3074F SYST BP LT 130 MM HG: CPT | Performed by: NURSE PRACTITIONER

## 2023-06-06 PROCEDURE — 1036F TOBACCO NON-USER: CPT | Performed by: NURSE PRACTITIONER

## 2023-06-06 PROCEDURE — 1111F DSCHRG MED/CURRENT MED MERGE: CPT | Performed by: NURSE PRACTITIONER

## 2023-06-06 PROCEDURE — 3079F DIAST BP 80-89 MM HG: CPT | Performed by: NURSE PRACTITIONER

## 2023-06-06 PROCEDURE — G8420 CALC BMI NORM PARAMETERS: HCPCS | Performed by: NURSE PRACTITIONER

## 2023-06-06 PROCEDURE — G8427 DOCREV CUR MEDS BY ELIG CLIN: HCPCS | Performed by: NURSE PRACTITIONER

## 2023-06-06 PROCEDURE — 99214 OFFICE O/P EST MOD 30 MIN: CPT | Performed by: NURSE PRACTITIONER

## 2023-06-06 PROCEDURE — 3046F HEMOGLOBIN A1C LEVEL >9.0%: CPT | Performed by: NURSE PRACTITIONER

## 2023-06-06 NOTE — PROGRESS NOTES
1 (diabetes mellitus, type 1) ([facility] [address]     Marijuana abuse     Non-compliance     Primary hypertension 6/2/2023    Trichimoniasis 11/19/2021     PAST SURGICAL HISTORY   Past Surgical History:   Procedure Laterality Date    ABDOMEN SURGERY N/A 5/9/2019    INCISION AND DRAINAGE OF SUPRAPUBIC ABSESS performed by Dilcia Tabor MD at [address] [place] [address]      last yr     SOCIAL HISTORY   Tobacco:   reports that she has never smoked. She has never used smokeless tobacco.  Alcol:   reports no history of alcohol use. Illicit Drugs:   reports current drug use. Drug: Marijuana [place]). FAMILY HISTORY   Family History   Problem Relation Age of Onset    Diabetes Maternal Grandmother     Diabetes Paternal Grandmother     Stroke Other     Thyroid Disease Neg Hx      ALLERGIES AND DRUG REACTIONS   No Known Allergies    CURRENT MEDICATIONS     Current Outpatient Medications   Medication Sig Dispense Refill    insulin glargine (LANTUS SOLOSTAR) 100 UNIT/ML injection pen Inject 22 Units into the skin every morning 15 mL 5    insulin aspart (NOVOLOG FLEXPEN) 100 UNIT/ML injection pen Inject 6 units with meals + following sliding scale. -200 add 2U, -250 add 4U, -300 add 6U, -350 add 8U, -400 add 10U, BS over 400 add 12U.  MA 30u/day 10 Adjustable Dose Pre-filled Pen Syringe 3    amLODIPine (NORVASC) 5 MG tablet Take 1 tablet by mouth daily 30 tablet 0    Insulin Disposable Pump (OMNIPOD 5 G6 INTRO, GEN 5,) KIT To use as directed (Patient not taking: Reported on 6/6/2023) 1 kit 0    Insulin Disposable Pump (OMNIPOD 5 G6 POD, GEN 5,) MISC 1 each by Does not apply route every 72 hours (Patient not taking: Reported on 6/6/2023) 10 each 11    Continuous Blood Gluc Transmit (DEXCOM G6 TRANSMITTER) MISC To change every 90 days (Patient not taking: Reported on 6/6/2023) 1 each 3    Continuous Blood Gluc Sensor (DEXCOM G6 SENSOR) MISC To change every 10 days (Patient not taking: Reported

## 2023-07-02 PROBLEM — E86.0 DEHYDRATION: Status: RESOLVED | Noted: 2023-06-02 | Resolved: 2023-07-02

## 2023-08-08 ENCOUNTER — OFFICE VISIT (OUTPATIENT)
Dept: ENDOCRINOLOGY | Age: 28
End: 2023-08-08

## 2023-08-08 VITALS
BODY MASS INDEX: 19.63 KG/M2 | RESPIRATION RATE: 18 BRPM | WEIGHT: 104 LBS | OXYGEN SATURATION: 100 % | SYSTOLIC BLOOD PRESSURE: 123 MMHG | HEART RATE: 102 BPM | DIASTOLIC BLOOD PRESSURE: 90 MMHG | HEIGHT: 61 IN

## 2023-08-08 DIAGNOSIS — E78.5 HYPERLIPIDEMIA, UNSPECIFIED HYPERLIPIDEMIA TYPE: ICD-10-CM

## 2023-08-08 DIAGNOSIS — E10.65 TYPE 1 DIABETES MELLITUS WITH HYPERGLYCEMIA (HCC): Primary | ICD-10-CM

## 2023-08-08 DIAGNOSIS — Z91.119 DIETARY NONCOMPLIANCE: ICD-10-CM

## 2023-08-08 DIAGNOSIS — E55.9 VITAMIN D DEFICIENCY: ICD-10-CM

## 2023-08-08 DIAGNOSIS — R80.9 MICROALBUMINURIA: ICD-10-CM

## 2023-08-08 PROCEDURE — 3046F HEMOGLOBIN A1C LEVEL >9.0%: CPT | Performed by: NURSE PRACTITIONER

## 2023-08-08 PROCEDURE — 1036F TOBACCO NON-USER: CPT | Performed by: NURSE PRACTITIONER

## 2023-08-08 PROCEDURE — 3074F SYST BP LT 130 MM HG: CPT | Performed by: NURSE PRACTITIONER

## 2023-08-08 PROCEDURE — 2022F DILAT RTA XM EVC RTNOPTHY: CPT | Performed by: NURSE PRACTITIONER

## 2023-08-08 PROCEDURE — G8420 CALC BMI NORM PARAMETERS: HCPCS | Performed by: NURSE PRACTITIONER

## 2023-08-08 PROCEDURE — G8427 DOCREV CUR MEDS BY ELIG CLIN: HCPCS | Performed by: NURSE PRACTITIONER

## 2023-08-08 PROCEDURE — 3080F DIAST BP >= 90 MM HG: CPT | Performed by: NURSE PRACTITIONER

## 2023-08-08 RX ORDER — INSULIN GLARGINE 100 [IU]/ML
INJECTION, SOLUTION SUBCUTANEOUS
Qty: 9 ML | Refills: 5 | Status: SHIPPED | OUTPATIENT
Start: 2023-08-08

## 2023-08-08 RX ORDER — INSULIN ASPART 100 [IU]/ML
INJECTION, SOLUTION INTRAVENOUS; SUBCUTANEOUS
Qty: 15 ADJUSTABLE DOSE PRE-FILLED PEN SYRINGE | Refills: 5 | Status: SHIPPED | OUTPATIENT
Start: 2023-08-08

## 2023-08-08 RX ORDER — BLOOD-GLUCOSE METER
KIT MISCELLANEOUS
Qty: 200 EACH | Refills: 5 | Status: SHIPPED | OUTPATIENT
Start: 2023-08-08

## 2023-08-08 NOTE — PROGRESS NOTES
Also, discussed with patient the risk and negative consequences of dietary noncompliance on blood glucose control, blood pressure and weight    Vit D deficiency   Not on  vitD supplement   Will check levels    Microalbuminuria   Will recheck once levels are at goal     HLD  Mixed Hyperglycemia   Will need to check once BS are controlled     I personally reviewed external notes from PCP and other patient's care team providers, and personally interpreted labs associated with the above diagnosis. I also ordered labs to further assess and manage the above addressed medical conditions    No follow-ups on file. The above issues were reviewed with the patient who understood and agreed with the plan. Thank you for allowing us to participate in the care of this patient. Please do not hesitate to contact us with any additional questions. Diagnosis Orders   1. Type 1 diabetes mellitus with hyperglycemia (HCC)        2. Dietary noncompliance        3. Vitamin D deficiency        4. Microalbuminuria        5. Hyperlipidemia, unspecified hyperlipidemia type              MIKE Tsang NP     [facility])   [facility], [address] [place] [address]   Phone: [phone]  Fax: [phone]  -------------------------   An electronic signature was used to authenticate this note.  MIKE Tsang NP on 8/8/2023 at 1:46 PM

## 2023-08-19 ENCOUNTER — APPOINTMENT (OUTPATIENT)
Dept: CT IMAGING | Age: 28
DRG: 420 | End: 2023-08-19
Payer: COMMERCIAL

## 2023-08-19 ENCOUNTER — APPOINTMENT (OUTPATIENT)
Dept: GENERAL RADIOLOGY | Age: 28
DRG: 420 | End: 2023-08-19
Payer: COMMERCIAL

## 2023-08-19 ENCOUNTER — APPOINTMENT (OUTPATIENT)
Dept: ULTRASOUND IMAGING | Age: 28
DRG: 420 | End: 2023-08-19
Payer: COMMERCIAL

## 2023-08-19 ENCOUNTER — HOSPITAL ENCOUNTER (INPATIENT)
Age: 28
LOS: 4 days | Discharge: HOME | DRG: 420 | End: 2023-08-23
Attending: EMERGENCY MEDICINE | Admitting: HOSPITALIST
Payer: COMMERCIAL

## 2023-08-19 DIAGNOSIS — R19.7 NAUSEA VOMITING AND DIARRHEA: ICD-10-CM

## 2023-08-19 DIAGNOSIS — R11.2 NAUSEA VOMITING AND DIARRHEA: ICD-10-CM

## 2023-08-19 DIAGNOSIS — E10.10 TYPE 1 DIABETES MELLITUS WITH KETOACIDOSIS WITHOUT COMA (HCC): Primary | ICD-10-CM

## 2023-08-19 LAB
ALBUMIN SERPL-MCNC: 4.6 G/DL (ref 3.5–5.2)
ALP SERPL-CCNC: 107 U/L (ref 35–104)
ALT SERPL-CCNC: 22 U/L (ref 0–32)
AMPHET UR QL SCN: NEGATIVE
ANION GAP SERPL CALCULATED.3IONS-SCNC: 30 MMOL/L (ref 7–16)
AST SERPL-CCNC: 41 U/L (ref 0–31)
B-HCG SERPL EIA 3RD IS-ACNC: 1.4 MIU/ML (ref 0–7)
BARBITURATES UR QL SCN: NEGATIVE
BASOPHILS # BLD: 0.06 K/UL (ref 0–0.2)
BASOPHILS NFR BLD: 0 % (ref 0–2)
BENZODIAZ UR QL: NEGATIVE
BILIRUB SERPL-MCNC: 0.3 MG/DL (ref 0–1.2)
BILIRUB UR QL STRIP: NEGATIVE
BUN SERPL-MCNC: 22 MG/DL (ref 6–20)
BUPRENORPHINE UR QL: NEGATIVE
CALCIUM SERPL-MCNC: 10 MG/DL (ref 8.6–10.2)
CANNABINOIDS UR QL SCN: POSITIVE
CHLORIDE SERPL-SCNC: 95 MMOL/L (ref 98–107)
CLARITY UR: CLEAR
CO2 SERPL-SCNC: 6 MMOL/L (ref 22–29)
COCAINE UR QL SCN: NEGATIVE
COLOR UR: YELLOW
CREAT SERPL-MCNC: 0.8 MG/DL (ref 0.5–1)
EOSINOPHIL # BLD: 0.01 K/UL (ref 0.05–0.5)
EOSINOPHILS RELATIVE PERCENT: 0 % (ref 0–6)
ERYTHROCYTE [DISTWIDTH] IN BLOOD BY AUTOMATED COUNT: 12.8 % (ref 11.5–15)
FENTANYL UR QL: NEGATIVE
GFR SERPL CREATININE-BSD FRML MDRD: >60 ML/MIN/1.73M2
GLUCOSE BLD-MCNC: 457 MG/DL (ref 74–99)
GLUCOSE SERPL-MCNC: 495 MG/DL (ref 74–99)
GLUCOSE UR STRIP-MCNC: 500 MG/DL
HCG, URINE, POC: NEGATIVE
HCT VFR BLD AUTO: 40.3 % (ref 34–48)
HGB BLD-MCNC: 12.9 G/DL (ref 11.5–15.5)
HGB UR QL STRIP.AUTO: ABNORMAL
IMM GRANULOCYTES # BLD AUTO: 0.19 K/UL (ref 0–0.58)
IMM GRANULOCYTES NFR BLD: 1 % (ref 0–5)
KETONES UR STRIP-MCNC: >80 MG/DL
LACTATE BLDV-SCNC: 3.2 MMOL/L (ref 0.5–2.2)
LEUKOCYTE ESTERASE UR QL STRIP: NEGATIVE
LIPASE SERPL-CCNC: 11 U/L (ref 13–60)
LYMPHOCYTES NFR BLD: 2.76 K/UL (ref 1.5–4)
LYMPHOCYTES RELATIVE PERCENT: 16 % (ref 20–42)
Lab: NORMAL
MCH RBC QN AUTO: 27.6 PG (ref 26–35)
MCHC RBC AUTO-ENTMCNC: 32 G/DL (ref 32–34.5)
MCV RBC AUTO: 86.3 FL (ref 80–99.9)
METHADONE UR QL: NEGATIVE
MONOCYTES NFR BLD: 0.28 K/UL (ref 0.1–0.95)
MONOCYTES NFR BLD: 2 % (ref 2–12)
NEGATIVE QC PASS/FAIL: NORMAL
NEUTROPHILS NFR BLD: 81 % (ref 43–80)
NEUTS SEG NFR BLD: 14.17 K/UL (ref 1.8–7.3)
NITRITE UR QL STRIP: NEGATIVE
OPIATES UR QL SCN: NEGATIVE
OXYCODONE UR QL SCN: NEGATIVE
PCP UR QL SCN: NEGATIVE
PH UR STRIP: 6 [PH] (ref 5–9)
PH VENOUS: 7.12 (ref 7.35–7.45)
PLATELET # BLD AUTO: 361 K/UL (ref 130–450)
PMV BLD AUTO: 10.8 FL (ref 7–12)
POSITIVE QC PASS/FAIL: NORMAL
POTASSIUM SERPL-SCNC: 5.3 MMOL/L (ref 3.5–5)
PROT SERPL-MCNC: 8.7 G/DL (ref 6.4–8.3)
PROT UR STRIP-MCNC: 30 MG/DL
RBC # BLD AUTO: 4.67 M/UL (ref 3.5–5.5)
RBC #/AREA URNS HPF: ABNORMAL /HPF
SODIUM SERPL-SCNC: 131 MMOL/L (ref 132–146)
SP GR UR STRIP: 1.02 (ref 1–1.03)
TEST INFORMATION: ABNORMAL
TROPONIN I SERPL HS-MCNC: 9 NG/L (ref 0–9)
UROBILINOGEN UR STRIP-ACNC: 0.2 EU/DL (ref 0–1)
WBC #/AREA URNS HPF: ABNORMAL /HPF
WBC OTHER # BLD: 17.5 K/UL (ref 4.5–11.5)

## 2023-08-19 PROCEDURE — 76856 US EXAM PELVIC COMPLETE: CPT

## 2023-08-19 PROCEDURE — 80053 COMPREHEN METABOLIC PANEL: CPT

## 2023-08-19 PROCEDURE — G0480 DRUG TEST DEF 1-7 CLASSES: HCPCS

## 2023-08-19 PROCEDURE — 80179 DRUG ASSAY SALICYLATE: CPT

## 2023-08-19 PROCEDURE — 81001 URINALYSIS AUTO W/SCOPE: CPT

## 2023-08-19 PROCEDURE — 2000000000 HC ICU R&B

## 2023-08-19 PROCEDURE — 80307 DRUG TEST PRSMV CHEM ANLYZR: CPT

## 2023-08-19 PROCEDURE — 96374 THER/PROPH/DIAG INJ IV PUSH: CPT

## 2023-08-19 PROCEDURE — 71045 X-RAY EXAM CHEST 1 VIEW: CPT

## 2023-08-19 PROCEDURE — 96375 TX/PRO/DX INJ NEW DRUG ADDON: CPT

## 2023-08-19 PROCEDURE — 99285 EMERGENCY DEPT VISIT HI MDM: CPT

## 2023-08-19 PROCEDURE — 83605 ASSAY OF LACTIC ACID: CPT

## 2023-08-19 PROCEDURE — 82962 GLUCOSE BLOOD TEST: CPT

## 2023-08-19 PROCEDURE — 82800 BLOOD PH: CPT

## 2023-08-19 PROCEDURE — 2580000003 HC RX 258: Performed by: EMERGENCY MEDICINE

## 2023-08-19 PROCEDURE — 85025 COMPLETE CBC W/AUTO DIFF WBC: CPT

## 2023-08-19 PROCEDURE — 80143 DRUG ASSAY ACETAMINOPHEN: CPT

## 2023-08-19 PROCEDURE — 6360000002 HC RX W HCPCS

## 2023-08-19 PROCEDURE — 83690 ASSAY OF LIPASE: CPT

## 2023-08-19 PROCEDURE — 82010 KETONE BODYS QUAN: CPT

## 2023-08-19 PROCEDURE — 6360000002 HC RX W HCPCS: Performed by: EMERGENCY MEDICINE

## 2023-08-19 PROCEDURE — 84484 ASSAY OF TROPONIN QUANT: CPT

## 2023-08-19 PROCEDURE — 84702 CHORIONIC GONADOTROPIN TEST: CPT

## 2023-08-19 RX ORDER — POTASSIUM CHLORIDE 7.45 MG/ML
10 INJECTION INTRAVENOUS PRN
Status: DISCONTINUED | OUTPATIENT
Start: 2023-08-19 | End: 2023-08-21

## 2023-08-19 RX ORDER — 0.9 % SODIUM CHLORIDE 0.9 %
1000 INTRAVENOUS SOLUTION INTRAVENOUS ONCE
Status: DISCONTINUED | OUTPATIENT
Start: 2023-08-19 | End: 2023-08-21

## 2023-08-19 RX ORDER — MAGNESIUM SULFATE IN WATER 40 MG/ML
2000 INJECTION, SOLUTION INTRAVENOUS PRN
Status: DISCONTINUED | OUTPATIENT
Start: 2023-08-19 | End: 2023-08-21

## 2023-08-19 RX ORDER — 0.9 % SODIUM CHLORIDE 0.9 %
1000 INTRAVENOUS SOLUTION INTRAVENOUS ONCE
Status: COMPLETED | OUTPATIENT
Start: 2023-08-19 | End: 2023-08-20

## 2023-08-19 RX ORDER — ONDANSETRON 2 MG/ML
4 INJECTION INTRAMUSCULAR; INTRAVENOUS ONCE
Status: COMPLETED | OUTPATIENT
Start: 2023-08-19 | End: 2023-08-19

## 2023-08-19 RX ORDER — DEXTROSE AND SODIUM CHLORIDE 5; .45 G/100ML; G/100ML
INJECTION, SOLUTION INTRAVENOUS CONTINUOUS PRN
Status: DISCONTINUED | OUTPATIENT
Start: 2023-08-19 | End: 2023-08-20

## 2023-08-19 RX ORDER — MORPHINE SULFATE 4 MG/ML
4 INJECTION, SOLUTION INTRAMUSCULAR; INTRAVENOUS ONCE
Status: COMPLETED | OUTPATIENT
Start: 2023-08-19 | End: 2023-08-19

## 2023-08-19 RX ORDER — SODIUM CHLORIDE 9 MG/ML
INJECTION, SOLUTION INTRAVENOUS CONTINUOUS
Status: DISCONTINUED | OUTPATIENT
Start: 2023-08-20 | End: 2023-08-21

## 2023-08-19 RX ORDER — 0.9 % SODIUM CHLORIDE 0.9 %
1000 INTRAVENOUS SOLUTION INTRAVENOUS ONCE
Status: COMPLETED | OUTPATIENT
Start: 2023-08-19 | End: 2023-08-19

## 2023-08-19 RX ORDER — 0.9 % SODIUM CHLORIDE 0.9 %
15 INTRAVENOUS SOLUTION INTRAVENOUS ONCE
Status: COMPLETED | OUTPATIENT
Start: 2023-08-19 | End: 2023-08-20

## 2023-08-19 RX ADMIN — MORPHINE SULFATE 4 MG: 4 INJECTION, SOLUTION INTRAMUSCULAR; INTRAVENOUS at 22:44

## 2023-08-19 RX ADMIN — ONDANSETRON 4 MG: 2 INJECTION INTRAMUSCULAR; INTRAVENOUS at 22:35

## 2023-08-19 RX ADMIN — SODIUM CHLORIDE 1000 ML: 9 INJECTION, SOLUTION INTRAVENOUS at 22:38

## 2023-08-19 ASSESSMENT — PAIN DESCRIPTION - LOCATION: LOCATION: BACK

## 2023-08-19 ASSESSMENT — PAIN DESCRIPTION - DESCRIPTORS: DESCRIPTORS: SHARP

## 2023-08-19 ASSESSMENT — PAIN DESCRIPTION - ORIENTATION: ORIENTATION: RIGHT;LEFT

## 2023-08-20 ENCOUNTER — APPOINTMENT (OUTPATIENT)
Dept: CT IMAGING | Age: 28
DRG: 420 | End: 2023-08-20
Payer: COMMERCIAL

## 2023-08-20 ENCOUNTER — APPOINTMENT (OUTPATIENT)
Dept: GENERAL RADIOLOGY | Age: 28
DRG: 420 | End: 2023-08-20
Payer: COMMERCIAL

## 2023-08-20 PROBLEM — Z91.148 HISTORY OF MEDICATION NONCOMPLIANCE: Status: ACTIVE | Noted: 2023-08-20

## 2023-08-20 PROBLEM — Z91.119 DIETARY NONCOMPLIANCE: Status: ACTIVE | Noted: 2018-11-09

## 2023-08-20 LAB
ALBUMIN SERPL-MCNC: 4.2 G/DL (ref 3.5–5.2)
ALP SERPL-CCNC: 106 U/L (ref 35–104)
ALT SERPL-CCNC: 21 U/L (ref 0–32)
ANION GAP SERPL CALCULATED.3IONS-SCNC: 11 MMOL/L (ref 7–16)
ANION GAP SERPL CALCULATED.3IONS-SCNC: 13 MMOL/L (ref 7–16)
ANION GAP SERPL CALCULATED.3IONS-SCNC: 17 MMOL/L (ref 7–16)
ANION GAP SERPL CALCULATED.3IONS-SCNC: 27 MMOL/L (ref 7–16)
ANION GAP SERPL CALCULATED.3IONS-SCNC: 30 MMOL/L (ref 7–16)
APAP SERPL-MCNC: <5 UG/ML (ref 10–30)
AST SERPL-CCNC: 33 U/L (ref 0–31)
B-OH-BUTYR SERPL-MCNC: >2.5 MMOL/L (ref 0.02–0.27)
BASOPHILS # BLD: 0.12 K/UL (ref 0–0.2)
BASOPHILS NFR BLD: 0 % (ref 0–2)
BILIRUB SERPL-MCNC: 0.2 MG/DL (ref 0–1.2)
BUN SERPL-MCNC: 15 MG/DL (ref 6–20)
BUN SERPL-MCNC: 19 MG/DL (ref 6–20)
BUN SERPL-MCNC: 22 MG/DL (ref 6–20)
BUN SERPL-MCNC: 28 MG/DL (ref 6–20)
BUN SERPL-MCNC: 31 MG/DL (ref 6–20)
CALCIUM SERPL-MCNC: 7.9 MG/DL (ref 8.6–10.2)
CALCIUM SERPL-MCNC: 7.9 MG/DL (ref 8.6–10.2)
CALCIUM SERPL-MCNC: 8 MG/DL (ref 8.6–10.2)
CALCIUM SERPL-MCNC: 8.1 MG/DL (ref 8.6–10.2)
CALCIUM SERPL-MCNC: 9.2 MG/DL (ref 8.6–10.2)
CHLORIDE SERPL-SCNC: 100 MMOL/L (ref 98–107)
CHLORIDE SERPL-SCNC: 107 MMOL/L (ref 98–107)
CHLORIDE SERPL-SCNC: 109 MMOL/L (ref 98–107)
CHLORIDE SERPL-SCNC: 111 MMOL/L (ref 98–107)
CHLORIDE SERPL-SCNC: 111 MMOL/L (ref 98–107)
CLUE CELLS VAG QL WET PREP: ABNORMAL
CO2 SERPL-SCNC: 11 MMOL/L (ref 22–29)
CO2 SERPL-SCNC: 14 MMOL/L (ref 22–29)
CO2 SERPL-SCNC: 16 MMOL/L (ref 22–29)
CO2 SERPL-SCNC: 3 MMOL/L (ref 22–29)
CO2 SERPL-SCNC: 6 MMOL/L (ref 22–29)
CREAT SERPL-MCNC: 0.6 MG/DL (ref 0.5–1)
CREAT SERPL-MCNC: 0.6 MG/DL (ref 0.5–1)
CREAT SERPL-MCNC: 0.7 MG/DL (ref 0.5–1)
CREAT SERPL-MCNC: 0.8 MG/DL (ref 0.5–1)
CREAT SERPL-MCNC: 0.8 MG/DL (ref 0.5–1)
EOSINOPHIL # BLD: 0.02 K/UL (ref 0.05–0.5)
EOSINOPHILS RELATIVE PERCENT: 0 % (ref 0–6)
ERYTHROCYTE [DISTWIDTH] IN BLOOD BY AUTOMATED COUNT: 12.9 % (ref 11.5–15)
ETHANOLAMINE SERPL-MCNC: <10 MG/DL
GFR SERPL CREATININE-BSD FRML MDRD: >60 ML/MIN/1.73M2
GLUCOSE BLD-MCNC: 125 MG/DL (ref 74–99)
GLUCOSE BLD-MCNC: 135 MG/DL (ref 74–99)
GLUCOSE BLD-MCNC: 164 MG/DL (ref 74–99)
GLUCOSE BLD-MCNC: 189 MG/DL (ref 74–99)
GLUCOSE BLD-MCNC: 189 MG/DL (ref 74–99)
GLUCOSE BLD-MCNC: 203 MG/DL (ref 74–99)
GLUCOSE BLD-MCNC: 211 MG/DL (ref 74–99)
GLUCOSE BLD-MCNC: 231 MG/DL (ref 74–99)
GLUCOSE BLD-MCNC: 237 MG/DL (ref 74–99)
GLUCOSE BLD-MCNC: 239 MG/DL (ref 74–99)
GLUCOSE BLD-MCNC: 298 MG/DL (ref 74–99)
GLUCOSE BLD-MCNC: 412 MG/DL (ref 74–99)
GLUCOSE BLD-MCNC: 464 MG/DL (ref 74–99)
GLUCOSE BLD-MCNC: 495 MG/DL (ref 74–99)
GLUCOSE BLD-MCNC: >500 MG/DL (ref 74–99)
GLUCOSE SERPL-MCNC: 180 MG/DL (ref 74–99)
GLUCOSE SERPL-MCNC: 180 MG/DL (ref 74–99)
GLUCOSE SERPL-MCNC: 239 MG/DL (ref 74–99)
GLUCOSE SERPL-MCNC: 299 MG/DL (ref 74–99)
GLUCOSE SERPL-MCNC: 582 MG/DL (ref 74–99)
HBA1C MFR BLD: 14.2 % (ref 4–5.6)
HCT VFR BLD AUTO: 37.3 % (ref 34–48)
HGB BLD-MCNC: 11.9 G/DL (ref 11.5–15.5)
IMM GRANULOCYTES # BLD AUTO: 0.67 K/UL (ref 0–0.58)
IMM GRANULOCYTES NFR BLD: 2 % (ref 0–5)
LACTATE BLDV-SCNC: 2.8 MMOL/L (ref 0.5–2.2)
LYMPHOCYTES NFR BLD: 5.13 K/UL (ref 1.5–4)
LYMPHOCYTES RELATIVE PERCENT: 17 % (ref 20–42)
MAGNESIUM SERPL-MCNC: 1.7 MG/DL (ref 1.6–2.6)
MAGNESIUM SERPL-MCNC: 1.8 MG/DL (ref 1.6–2.6)
MAGNESIUM SERPL-MCNC: 1.9 MG/DL (ref 1.6–2.6)
MAGNESIUM SERPL-MCNC: 2.1 MG/DL (ref 1.6–2.6)
MAGNESIUM SERPL-MCNC: 2.4 MG/DL (ref 1.6–2.6)
MCH RBC QN AUTO: 27.7 PG (ref 26–35)
MCHC RBC AUTO-ENTMCNC: 31.9 G/DL (ref 32–34.5)
MCV RBC AUTO: 86.9 FL (ref 80–99.9)
MONOCYTES NFR BLD: 1.5 K/UL (ref 0.1–0.95)
MONOCYTES NFR BLD: 5 % (ref 2–12)
NEUTROPHILS NFR BLD: 75 % (ref 43–80)
NEUTS SEG NFR BLD: 22.09 K/UL (ref 1.8–7.3)
PH VENOUS: 7.17 (ref 7.35–7.45)
PHOSPHATE SERPL-MCNC: 1.5 MG/DL (ref 2.5–4.5)
PHOSPHATE SERPL-MCNC: 1.8 MG/DL (ref 2.5–4.5)
PHOSPHATE SERPL-MCNC: 2.5 MG/DL (ref 2.5–4.5)
PHOSPHATE SERPL-MCNC: 3.4 MG/DL (ref 2.5–4.5)
PHOSPHATE SERPL-MCNC: 5.9 MG/DL (ref 2.5–4.5)
PLATELET # BLD AUTO: 365 K/UL (ref 130–450)
PMV BLD AUTO: 11.3 FL (ref 7–12)
POTASSIUM SERPL-SCNC: 3.8 MMOL/L (ref 3.5–5)
POTASSIUM SERPL-SCNC: 4 MMOL/L (ref 3.5–5)
POTASSIUM SERPL-SCNC: 4 MMOL/L (ref 3.5–5)
POTASSIUM SERPL-SCNC: 5.2 MMOL/L (ref 3.5–5)
POTASSIUM SERPL-SCNC: 6.5 MMOL/L (ref 3.5–5)
PROCALCITONIN SERPL-MCNC: 0.34 NG/ML (ref 0–0.08)
PROT SERPL-MCNC: 7.7 G/DL (ref 6.4–8.3)
RBC # BLD AUTO: 4.29 M/UL (ref 3.5–5.5)
RBC # BLD: ABNORMAL 10*6/UL
RBC # BLD: ABNORMAL 10*6/UL
SALICYLATES SERPL-MCNC: <0.3 MG/DL (ref 0–30)
SODIUM SERPL-SCNC: 133 MMOL/L (ref 132–146)
SODIUM SERPL-SCNC: 136 MMOL/L (ref 132–146)
SODIUM SERPL-SCNC: 138 MMOL/L (ref 132–146)
SODIUM SERPL-SCNC: 139 MMOL/L (ref 132–146)
SODIUM SERPL-SCNC: 140 MMOL/L (ref 132–146)
SOURCE WET PREP: ABNORMAL
T VAGINALIS VAG QL WET PREP: ABNORMAL
TOXIC TRICYCLIC SC,BLOOD: NEGATIVE
WBC OTHER # BLD: 29.5 K/UL (ref 4.5–11.5)
YEAST WET PREP: ABNORMAL

## 2023-08-20 PROCEDURE — 87081 CULTURE SCREEN ONLY: CPT

## 2023-08-20 PROCEDURE — 2580000003 HC RX 258

## 2023-08-20 PROCEDURE — 6370000000 HC RX 637 (ALT 250 FOR IP)

## 2023-08-20 PROCEDURE — 2500000003 HC RX 250 WO HCPCS

## 2023-08-20 PROCEDURE — 2000000000 HC ICU R&B

## 2023-08-20 PROCEDURE — 83036 HEMOGLOBIN GLYCOSYLATED A1C: CPT

## 2023-08-20 PROCEDURE — 87086 URINE CULTURE/COLONY COUNT: CPT

## 2023-08-20 PROCEDURE — 82800 BLOOD PH: CPT

## 2023-08-20 PROCEDURE — 87040 BLOOD CULTURE FOR BACTERIA: CPT

## 2023-08-20 PROCEDURE — 71045 X-RAY EXAM CHEST 1 VIEW: CPT

## 2023-08-20 PROCEDURE — 84100 ASSAY OF PHOSPHORUS: CPT

## 2023-08-20 PROCEDURE — 6360000002 HC RX W HCPCS: Performed by: INTERNAL MEDICINE

## 2023-08-20 PROCEDURE — 80053 COMPREHEN METABOLIC PANEL: CPT

## 2023-08-20 PROCEDURE — 87491 CHLMYD TRACH DNA AMP PROBE: CPT

## 2023-08-20 PROCEDURE — 83735 ASSAY OF MAGNESIUM: CPT

## 2023-08-20 PROCEDURE — 2500000003 HC RX 250 WO HCPCS: Performed by: INTERNAL MEDICINE

## 2023-08-20 PROCEDURE — 6370000000 HC RX 637 (ALT 250 FOR IP): Performed by: INTERNAL MEDICINE

## 2023-08-20 PROCEDURE — 85025 COMPLETE CBC W/AUTO DIFF WBC: CPT

## 2023-08-20 PROCEDURE — 84145 PROCALCITONIN (PCT): CPT

## 2023-08-20 PROCEDURE — 99223 1ST HOSP IP/OBS HIGH 75: CPT | Performed by: HOSPITALIST

## 2023-08-20 PROCEDURE — 82962 GLUCOSE BLOOD TEST: CPT

## 2023-08-20 PROCEDURE — 83605 ASSAY OF LACTIC ACID: CPT

## 2023-08-20 PROCEDURE — 6360000002 HC RX W HCPCS: Performed by: EMERGENCY MEDICINE

## 2023-08-20 PROCEDURE — 87210 SMEAR WET MOUNT SALINE/INK: CPT

## 2023-08-20 PROCEDURE — 87591 N.GONORRHOEAE DNA AMP PROB: CPT

## 2023-08-20 PROCEDURE — 6360000002 HC RX W HCPCS

## 2023-08-20 PROCEDURE — 80048 BASIC METABOLIC PNL TOTAL CA: CPT

## 2023-08-20 PROCEDURE — 99222 1ST HOSP IP/OBS MODERATE 55: CPT | Performed by: INTERNAL MEDICINE

## 2023-08-20 PROCEDURE — 2580000003 HC RX 258: Performed by: INTERNAL MEDICINE

## 2023-08-20 PROCEDURE — 74176 CT ABD & PELVIS W/O CONTRAST: CPT

## 2023-08-20 RX ORDER — POTASSIUM CHLORIDE 20 MEQ/1
20 TABLET, EXTENDED RELEASE ORAL ONCE
Status: COMPLETED | OUTPATIENT
Start: 2023-08-20 | End: 2023-08-20

## 2023-08-20 RX ORDER — MORPHINE SULFATE 4 MG/ML
4 INJECTION, SOLUTION INTRAMUSCULAR; INTRAVENOUS
Status: DISCONTINUED | OUTPATIENT
Start: 2023-08-20 | End: 2023-08-21

## 2023-08-20 RX ORDER — POTASSIUM CHLORIDE 20 MEQ/1
40 TABLET, EXTENDED RELEASE ORAL ONCE
Status: COMPLETED | OUTPATIENT
Start: 2023-08-20 | End: 2023-08-20

## 2023-08-20 RX ORDER — DEXTROSE MONOHYDRATE, SODIUM CHLORIDE, AND POTASSIUM CHLORIDE 50; 1.49; 4.5 G/1000ML; G/1000ML; G/1000ML
INJECTION, SOLUTION INTRAVENOUS CONTINUOUS PRN
Status: DISCONTINUED | OUTPATIENT
Start: 2023-08-20 | End: 2023-08-21

## 2023-08-20 RX ORDER — PANTOPRAZOLE SODIUM 40 MG/1
40 TABLET, DELAYED RELEASE ORAL
Status: DISCONTINUED | OUTPATIENT
Start: 2023-08-20 | End: 2023-08-23 | Stop reason: HOSPADM

## 2023-08-20 RX ORDER — MORPHINE SULFATE 4 MG/ML
4 INJECTION, SOLUTION INTRAMUSCULAR; INTRAVENOUS ONCE
Status: COMPLETED | OUTPATIENT
Start: 2023-08-20 | End: 2023-08-20

## 2023-08-20 RX ORDER — METRONIDAZOLE 500 MG/1
500 TABLET ORAL EVERY 8 HOURS SCHEDULED
Status: DISCONTINUED | OUTPATIENT
Start: 2023-08-20 | End: 2023-08-23 | Stop reason: HOSPADM

## 2023-08-20 RX ORDER — MORPHINE SULFATE 2 MG/ML
2 INJECTION, SOLUTION INTRAMUSCULAR; INTRAVENOUS ONCE
Status: COMPLETED | OUTPATIENT
Start: 2023-08-20 | End: 2023-08-20

## 2023-08-20 RX ADMIN — SODIUM CHLORIDE 1000 ML: 9 INJECTION, SOLUTION INTRAVENOUS at 04:18

## 2023-08-20 RX ADMIN — BENZOCAINE AND MENTHOL 1 LOZENGE: 15; 3.6 LOZENGE ORAL at 14:25

## 2023-08-20 RX ADMIN — MORPHINE SULFATE 4 MG: 4 INJECTION, SOLUTION INTRAMUSCULAR; INTRAVENOUS at 10:45

## 2023-08-20 RX ADMIN — BENZOCAINE AND MENTHOL 1 LOZENGE: 15; 3.6 LOZENGE ORAL at 07:30

## 2023-08-20 RX ADMIN — POTASSIUM CHLORIDE, DEXTROSE MONOHYDRATE AND SODIUM CHLORIDE: 150; 5; 450 INJECTION, SOLUTION INTRAVENOUS at 10:15

## 2023-08-20 RX ADMIN — BENZOCAINE AND MENTHOL 1 LOZENGE: 15; 3.6 LOZENGE ORAL at 09:35

## 2023-08-20 RX ADMIN — PANTOPRAZOLE SODIUM 40 MG: 40 TABLET, DELAYED RELEASE ORAL at 13:48

## 2023-08-20 RX ADMIN — SODIUM BICARBONATE 100 MEQ: 84 INJECTION, SOLUTION INTRAVENOUS at 05:53

## 2023-08-20 RX ADMIN — POTASSIUM CHLORIDE 40 MEQ: 1500 TABLET, EXTENDED RELEASE ORAL at 10:44

## 2023-08-20 RX ADMIN — POTASSIUM CHLORIDE 20 MEQ: 1500 TABLET, EXTENDED RELEASE ORAL at 23:02

## 2023-08-20 RX ADMIN — MORPHINE SULFATE 4 MG: 4 INJECTION, SOLUTION INTRAMUSCULAR; INTRAVENOUS at 20:54

## 2023-08-20 RX ADMIN — SODIUM CHLORIDE 10.74 UNITS/HR: 9 INJECTION, SOLUTION INTRAVENOUS at 14:00

## 2023-08-20 RX ADMIN — METRONIDAZOLE 500 MG: 500 TABLET ORAL at 13:48

## 2023-08-20 RX ADMIN — WATER 1000 MG: 1 INJECTION INTRAMUSCULAR; INTRAVENOUS; SUBCUTANEOUS at 13:49

## 2023-08-20 RX ADMIN — SODIUM CHLORIDE 8.7 UNITS/HR: 9 INJECTION, SOLUTION INTRAVENOUS at 02:47

## 2023-08-20 RX ADMIN — POTASSIUM CHLORIDE, DEXTROSE MONOHYDRATE AND SODIUM CHLORIDE: 150; 5; 450 INJECTION, SOLUTION INTRAVENOUS at 18:11

## 2023-08-20 RX ADMIN — SODIUM CHLORIDE 714 ML: 9 INJECTION, SOLUTION INTRAVENOUS at 06:51

## 2023-08-20 RX ADMIN — METRONIDAZOLE 500 MG: 500 TABLET ORAL at 20:54

## 2023-08-20 RX ADMIN — MORPHINE SULFATE 2 MG: 2 INJECTION, SOLUTION INTRAMUSCULAR; INTRAVENOUS at 01:07

## 2023-08-20 RX ADMIN — SODIUM PHOSPHATE, MONOBASIC, MONOHYDRATE AND SODIUM PHOSPHATE, DIBASIC, ANHYDROUS 20 MMOL: 276; 142 INJECTION, SOLUTION INTRAVENOUS at 18:35

## 2023-08-20 RX ADMIN — Medication 100 MEQ: at 05:53

## 2023-08-20 RX ADMIN — SODIUM CHLORIDE 8.7 UNITS/HR: 9 INJECTION, SOLUTION INTRAVENOUS at 04:20

## 2023-08-20 RX ADMIN — MORPHINE SULFATE 4 MG: 4 INJECTION, SOLUTION INTRAMUSCULAR; INTRAVENOUS at 14:25

## 2023-08-20 RX ADMIN — SODIUM CHLORIDE: 9 INJECTION, SOLUTION INTRAVENOUS at 07:00

## 2023-08-20 RX ADMIN — INSULIN HUMAN 10 UNITS: 100 INJECTION, SOLUTION PARENTERAL at 04:21

## 2023-08-20 RX ADMIN — MORPHINE SULFATE 4 MG: 4 INJECTION, SOLUTION INTRAMUSCULAR; INTRAVENOUS at 04:12

## 2023-08-20 ASSESSMENT — PAIN SCALES - GENERAL
PAINLEVEL_OUTOF10: 10
PAINLEVEL_OUTOF10: 6
PAINLEVEL_OUTOF10: 10
PAINLEVEL_OUTOF10: 8
PAINLEVEL_OUTOF10: 8
PAINLEVEL_OUTOF10: 10
PAINLEVEL_OUTOF10: 8

## 2023-08-20 ASSESSMENT — PAIN DESCRIPTION - LOCATION
LOCATION: BACK
LOCATION: HEAD;BACK
LOCATION: BACK;ABDOMEN
LOCATION: BACK
LOCATION: BACK;THROAT

## 2023-08-20 ASSESSMENT — PAIN DESCRIPTION - ORIENTATION
ORIENTATION: RIGHT;LEFT
ORIENTATION: RIGHT;LEFT
ORIENTATION: LOWER
ORIENTATION: LOWER

## 2023-08-20 ASSESSMENT — PAIN DESCRIPTION - PAIN TYPE
TYPE: ACUTE PAIN
TYPE: ACUTE PAIN

## 2023-08-20 ASSESSMENT — PAIN DESCRIPTION - DESCRIPTORS
DESCRIPTORS: SORE
DESCRIPTORS: SHARP;SORE
DESCRIPTORS: SHARP
DESCRIPTORS: SHARP

## 2023-08-20 ASSESSMENT — PAIN DESCRIPTION - FREQUENCY: FREQUENCY: CONTINUOUS

## 2023-08-20 NOTE — H&P
[facility] History and Physical      CHIEF COMPLAINT: Intractable nausea and vomiting with back pain    History of Present Illness:      22-year-old -American lady past medical history of longstanding type 1 diabetes with noncompliance, hypertension and depression presented to the ED for 3 days of intractable nausea and vomiting. Patient was in moderate distress during clinical evaluation. He reports her symptoms are typical for previous episodes of DKA she is experienced. Reports she is been using her insulin as directed. Informant(s) for H&P:    REVIEW OF SYSTEMS:  A comprehensive review of systems was negative except for: what is in the HPI      PMH:  Past Medical History:   Diagnosis Date    Bleeding at insertion site 01/05/2018    Common femoral artery injury, right, initial encounter 01/05/2018    Depressive disorder     Diabetic ketoacidosis ([address])     DM type 1 (diabetes mellitus, type 1) ([address])     Marijuana abuse     Non-compliance     Primary hypertension 6/2/2023    Trichimoniasis 11/19/2021       Surgical History:  Past Surgical History:   Procedure Laterality Date    ABDOMEN SURGERY N/A 5/9/2019    INCISION AND DRAINAGE OF SUPRAPUBIC ABSESS performed by Yusuf Sam MD at [facility]      last yr       Medications Prior to Admission:    Prior to Admission medications    Medication Sig Start Date End Date Taking?  Authorizing Provider   insulin aspart (NOVOLOG FLEXPEN) 100 UNIT/ML injection pen 1:8 + ss 2:50>150 max 50 units a day 8/8/23   MIKE Avelar NP   insulin glargine (LANTUS SOLOSTAR) 100 UNIT/ML injection pen Inject 22 units once a day 8/8/23   MIKE Avelar NP   blood glucose test strips (FREESTYLE LITE) strip Check blood glucose 4x a day 8/8/23   MIKE Avelar NP   glucose 4 g chewable tablet Take 4 tablets by mouth as needed for Low blood sugar 8/8/23   MIKE Avelar NP

## 2023-08-20 NOTE — PLAN OF CARE
Problem: Discharge Planning  Goal: Discharge to home or other facility with appropriate resources  8/20/2023 1852 by Max Diaz RN  Outcome: Progressing  8/20/2023 0731 by Miguelina Gilmore RN  Outcome: Not Progressing     Problem: Pain  Goal: Verbalizes/displays adequate comfort level or baseline comfort level  8/20/2023 1852 by Max Diaz RN  Outcome: Progressing  8/20/2023 0731 by Miguelina Gilmore RN  Outcome: Not Progressing     Problem: Chronic Conditions and Co-morbidities  Goal: Patient's chronic conditions and co-morbidity symptoms are monitored and maintained or improved  8/20/2023 1852 by Max Diaz RN  Outcome: Progressing  8/20/2023 0731 by Miguelina Gilmore RN  Outcome: Not Progressing     Problem: Safety - Adult  Goal: Free from fall injury  Outcome: Progressing     Problem: Discharge Planning  Goal: Discharge to home or other facility with appropriate resources  8/20/2023 1852 by Max Diaz RN  Outcome: Progressing  8/20/2023 0731 by Miguelina Gilmore RN  Outcome: Not Progressing     Problem: Pain  Goal: Verbalizes/displays adequate comfort level or baseline comfort level  8/20/2023 1852 by Max Diaz RN  Outcome: Progressing  8/20/2023 0731 by Miguelina Gilmore RN  Outcome: Not Progressing     Problem: Chronic Conditions and Co-morbidities  Goal: Patient's chronic conditions and co-morbidity symptoms are monitored and maintained or improved  8/20/2023 1852 by Max Diaz RN  Outcome: Progressing  8/20/2023 0731 by Miguelina Gilmore RN  Outcome: Not Progressing

## 2023-08-20 NOTE — ED NOTES
IV access was attempted and lost 3 times. Dr. Ginna Goode and attempted central line. No access at this time. Codie Spring in ICU made aware.  PA upstairs      Rach Jones RN  08/20/23 2346

## 2023-08-20 NOTE — ED NOTES
Dr. Shreyas Valdes okay to do CT without IV dye. Patient is still complaining of excruciating back pain and states that the morphine did not do anything for her.       Mariella Sorensen RN  08/20/23 4047

## 2023-08-20 NOTE — PLAN OF CARE
Problem: Discharge Planning  Goal: Discharge to home or other facility with appropriate resources  Outcome: Not Progressing     Problem: Pain  Goal: Verbalizes/displays adequate comfort level or baseline comfort level  Outcome: Not Progressing     Problem: Chronic Conditions and Co-morbidities  Goal: Patient's chronic conditions and co-morbidity symptoms are monitored and maintained or improved  Outcome: Not Progressing

## 2023-08-20 NOTE — ED NOTES
Patient refused CT scan and wanted something more for pain before she went.      Tia Helton RN  08/20/23 [id number]

## 2023-08-20 NOTE — ED PROVIDER NOTES
[facility]        Pt Name: Jenna Chase  MRN: [id number]  [address] [facility] [place] 1995  Date of evaluation: 8/19/2023  Provider: Calli Byrd DO  PCP: Zhen Alex MD  Note Started: 9:25 PM EDT 8/19/23    CHIEF COMPLAINT       Chief Complaint   Patient presents with    Hyperglycemia         Shortness of Breath    Emesis    Nausea    Back Pain     Lower back pain       HISTORY OF PRESENT ILLNESS: 1 or more Elements   History From: Patient    Limitations to history : None  Social Determinants : None    Jenna Chase is a 24 y.o. female who presents with nausea, vomiting, SOB and back pain. She states this all started earlier today. She has a hx of Type 1 DM. She did not take her insulin today because she was not feeling well. She checked her blood glucose today and stated it was high, but did not know the exact reading. She has had DKA before in the past. She has pain in the lower back bilaterally that started today. She denies pain with urination. Denies any fever, chills, headache, dizziness, vision changes, neck tenderness or stiffness, weakness, cp, palpitations, cough, dysuria, hematuria, diarrhea, constipation, bloody stools. Nursing Notes were all reviewed and agreed with or any disagreements were addressed in the HPI.    ROS:   Pertinent positives and negatives are stated within HPI, all other systems reviewed and are negative.      --------------------------------------------- PAST HISTORY ---------------------------------------------  Past Medical History:  has a past medical history of Bleeding at insertion site, Common femoral artery injury, right, initial encounter, Depressive disorder, Diabetic ketoacidosis ([address]), DM type 1 (diabetes mellitus, type 1) ([address]), Marijuana abuse, Non-compliance, Primary hypertension, and Trichimoniasis.     Past Surgical History:  has a past surgical history that

## 2023-08-20 NOTE — CONSULTS
ENDOCRINOLOGY INITIAL CONSULTATION NOTE       Date of admission: 8/19/2023  Date of service: 8/20/2023  Admitting physician: Roya Dewey DO   Primary Care Physician: Kishan Jain MD  Consultant physician: Deepak Nicolas MD      Reason for the consultation:  Poorly controled DM     History of Present Illness:  Services were provided through a video synchronous discussion     Rachel Singh is a 24 y.o. old female with PMH of DM type 1 admitted to [facility] on 8/19/2023 because of N/V and malaise and found to be in DKA, endocrine team was consulted for diabetes management. The patient was in her usual state of health until few days ago when started complaining of fatigue and intermittent nausea and vomiting. Nausea and vomiting worsened on the day of admission and decided to seek medical advice. The patient denies history of fever, chills, cough or shortness of breath. She has been taking insulin as prescribed. Admission labs showed blood sugar 495, anion gap 30, bicarb 6, creatinine 0.8, potassium 5.3, beta hydroxybutyrate > 4.5     Prior to admission  Type 1 DM was diagnosed at the age 8. Prior to admission, she was basaglar 22 units daily, Humalog with meals using carb ratio 1u:8g  + ss 1:50>150. self-blood glucose monitoring has been highly variable prior to admission. She is due for annual eye exam but denied any history of diabetic retinopathy. The patient performs her own feet care and doesn't see podiatry service  Microvascular complications:  No Retinopathy, Nephropathy + Neuropathy   Macrovascular complications: no CAD, PVD, or Stroke    Lab Results   Component Value Date/Time    LABA1C 13.7 06/06/2023 03:41 PM     Inpatient diet:   Carb Restricted diet     Point of care glucose monitoring (Independently reviewed)   No results for input(s): GLUMET in the last 72 hours.     Past medical history:   Past Medical History:   Diagnosis Date    Bleeding at insertion site 01/05/2018    Common femoral

## 2023-08-20 NOTE — PATIENT CARE CONFERENCE
Intensive Care Daily Quality Rounding Checklist      ICU Team Members: Dr Otoniel Joya, charge nurse, bedside nurse    ICU Day #: NUMBER: 1    Intubation Date: N/A    Ventilator Day #: N/A    Central Line Insertion Date:  N/A        Day #: N/A        Indication: N/A     Arterial Line Insertion Date:  N/A      Day #: N/A    Temporary Hemodialysis Catheter Insertion Date:  N/A      Day # N/A    DVT Prophylaxis: none    GI Prophylaxis: Protonix     Bey Catheter Insertion Date:  N/A       Day #: N/A      Indications: N/A      Continued need (if yes, reason documented and discussed with physician): N/A    Skin Issues/ Wounds and ordered treatment discussed on rounds: no issues    Goals/ Plans for the Day: Daily labs, DKA protocol, up ad jeremi

## 2023-08-21 LAB
ALBUMIN SERPL-MCNC: 3.2 G/DL (ref 3.5–5.2)
ALP SERPL-CCNC: 71 U/L (ref 35–104)
ALT SERPL-CCNC: 14 U/L (ref 0–32)
ANION GAP SERPL CALCULATED.3IONS-SCNC: 10 MMOL/L (ref 7–16)
ANION GAP SERPL CALCULATED.3IONS-SCNC: 11 MMOL/L (ref 7–16)
AST SERPL-CCNC: 23 U/L (ref 0–31)
BASOPHILS # BLD: 0.02 K/UL (ref 0–0.2)
BASOPHILS NFR BLD: 0 % (ref 0–2)
BILIRUB SERPL-MCNC: 0.2 MG/DL (ref 0–1.2)
BUN SERPL-MCNC: 12 MG/DL (ref 6–20)
BUN SERPL-MCNC: 12 MG/DL (ref 6–20)
CALCIUM SERPL-MCNC: 7.6 MG/DL (ref 8.6–10.2)
CALCIUM SERPL-MCNC: 7.7 MG/DL (ref 8.6–10.2)
CHLORIDE SERPL-SCNC: 110 MMOL/L (ref 98–107)
CHLORIDE SERPL-SCNC: 112 MMOL/L (ref 98–107)
CO2 SERPL-SCNC: 15 MMOL/L (ref 22–29)
CO2 SERPL-SCNC: 15 MMOL/L (ref 22–29)
CREAT SERPL-MCNC: 0.6 MG/DL (ref 0.5–1)
CREAT SERPL-MCNC: 0.6 MG/DL (ref 0.5–1)
EOSINOPHIL # BLD: 0.03 K/UL (ref 0.05–0.5)
EOSINOPHILS RELATIVE PERCENT: 0 % (ref 0–6)
ERYTHROCYTE [DISTWIDTH] IN BLOOD BY AUTOMATED COUNT: 13.2 % (ref 11.5–15)
GFR SERPL CREATININE-BSD FRML MDRD: >60 ML/MIN/1.73M2
GFR SERPL CREATININE-BSD FRML MDRD: >60 ML/MIN/1.73M2
GLUCOSE BLD-MCNC: 101 MG/DL (ref 74–99)
GLUCOSE BLD-MCNC: 130 MG/DL (ref 74–99)
GLUCOSE BLD-MCNC: 131 MG/DL (ref 74–99)
GLUCOSE BLD-MCNC: 135 MG/DL (ref 74–99)
GLUCOSE BLD-MCNC: 139 MG/DL (ref 74–99)
GLUCOSE BLD-MCNC: 187 MG/DL (ref 74–99)
GLUCOSE BLD-MCNC: 193 MG/DL (ref 74–99)
GLUCOSE BLD-MCNC: 197 MG/DL (ref 74–99)
GLUCOSE BLD-MCNC: 65 MG/DL (ref 74–99)
GLUCOSE BLD-MCNC: 72 MG/DL (ref 74–99)
GLUCOSE BLD-MCNC: 80 MG/DL (ref 74–99)
GLUCOSE SERPL-MCNC: 131 MG/DL (ref 74–99)
GLUCOSE SERPL-MCNC: 151 MG/DL (ref 74–99)
HCT VFR BLD AUTO: 28.5 % (ref 34–48)
HGB BLD-MCNC: 9.9 G/DL (ref 11.5–15.5)
IMM GRANULOCYTES # BLD AUTO: 0.15 K/UL (ref 0–0.58)
IMM GRANULOCYTES NFR BLD: 1 % (ref 0–5)
LYMPHOCYTES NFR BLD: 2.14 K/UL (ref 1.5–4)
LYMPHOCYTES RELATIVE PERCENT: 12 % (ref 20–42)
MAGNESIUM SERPL-MCNC: 1.6 MG/DL (ref 1.6–2.6)
MAGNESIUM SERPL-MCNC: 2.5 MG/DL (ref 1.6–2.6)
MCH RBC QN AUTO: 28.3 PG (ref 26–35)
MCHC RBC AUTO-ENTMCNC: 34.7 G/DL (ref 32–34.5)
MCV RBC AUTO: 81.4 FL (ref 80–99.9)
MICROORGANISM SPEC CULT: ABNORMAL
MICROORGANISM SPEC CULT: NORMAL
MONOCYTES NFR BLD: 1.3 K/UL (ref 0.1–0.95)
MONOCYTES NFR BLD: 7 % (ref 2–12)
NEUTROPHILS NFR BLD: 80 % (ref 43–80)
NEUTS SEG NFR BLD: 14.28 K/UL (ref 1.8–7.3)
PHOSPHATE SERPL-MCNC: 2.4 MG/DL (ref 2.5–4.5)
PHOSPHATE SERPL-MCNC: 2.9 MG/DL (ref 2.5–4.5)
PLATELET # BLD AUTO: 259 K/UL (ref 130–450)
PMV BLD AUTO: 10.6 FL (ref 7–12)
POTASSIUM SERPL-SCNC: 4.1 MMOL/L (ref 3.5–5)
POTASSIUM SERPL-SCNC: 4.2 MMOL/L (ref 3.5–5)
PROT SERPL-MCNC: 6 G/DL (ref 6.4–8.3)
RBC # BLD AUTO: 3.5 M/UL (ref 3.5–5.5)
SODIUM SERPL-SCNC: 136 MMOL/L (ref 132–146)
SODIUM SERPL-SCNC: 137 MMOL/L (ref 132–146)
SPECIMEN DESCRIPTION: ABNORMAL
SPECIMEN DESCRIPTION: NORMAL
WBC OTHER # BLD: 17.9 K/UL (ref 4.5–11.5)

## 2023-08-21 PROCEDURE — 84100 ASSAY OF PHOSPHORUS: CPT

## 2023-08-21 PROCEDURE — 6360000002 HC RX W HCPCS: Performed by: INTERNAL MEDICINE

## 2023-08-21 PROCEDURE — 6370000000 HC RX 637 (ALT 250 FOR IP)

## 2023-08-21 PROCEDURE — 2500000003 HC RX 250 WO HCPCS

## 2023-08-21 PROCEDURE — 1200000000 HC SEMI PRIVATE

## 2023-08-21 PROCEDURE — 82962 GLUCOSE BLOOD TEST: CPT

## 2023-08-21 PROCEDURE — 80048 BASIC METABOLIC PNL TOTAL CA: CPT

## 2023-08-21 PROCEDURE — 6360000002 HC RX W HCPCS

## 2023-08-21 PROCEDURE — 6370000000 HC RX 637 (ALT 250 FOR IP): Performed by: INTERNAL MEDICINE

## 2023-08-21 PROCEDURE — 2580000003 HC RX 258: Performed by: INTERNAL MEDICINE

## 2023-08-21 PROCEDURE — 80053 COMPREHEN METABOLIC PANEL: CPT

## 2023-08-21 PROCEDURE — 2580000003 HC RX 258

## 2023-08-21 PROCEDURE — 83735 ASSAY OF MAGNESIUM: CPT

## 2023-08-21 PROCEDURE — 99231 SBSQ HOSP IP/OBS SF/LOW 25: CPT | Performed by: STUDENT IN AN ORGANIZED HEALTH CARE EDUCATION/TRAINING PROGRAM

## 2023-08-21 PROCEDURE — 2500000003 HC RX 250 WO HCPCS: Performed by: INTERNAL MEDICINE

## 2023-08-21 PROCEDURE — 85025 COMPLETE CBC W/AUTO DIFF WBC: CPT

## 2023-08-21 PROCEDURE — 99232 SBSQ HOSP IP/OBS MODERATE 35: CPT | Performed by: INTERNAL MEDICINE

## 2023-08-21 RX ORDER — SODIUM CHLORIDE 0.9 % (FLUSH) 0.9 %
5-40 SYRINGE (ML) INJECTION PRN
Status: DISCONTINUED | OUTPATIENT
Start: 2023-08-21 | End: 2023-08-23 | Stop reason: HOSPADM

## 2023-08-21 RX ORDER — INSULIN GLARGINE 100 [IU]/ML
22 INJECTION, SOLUTION SUBCUTANEOUS DAILY
Status: DISCONTINUED | OUTPATIENT
Start: 2023-08-21 | End: 2023-08-22

## 2023-08-21 RX ORDER — INSULIN LISPRO 100 [IU]/ML
4 INJECTION, SOLUTION INTRAVENOUS; SUBCUTANEOUS
Status: DISCONTINUED | OUTPATIENT
Start: 2023-08-21 | End: 2023-08-22

## 2023-08-21 RX ORDER — INSULIN LISPRO 100 [IU]/ML
0-8 INJECTION, SOLUTION INTRAVENOUS; SUBCUTANEOUS
Status: DISCONTINUED | OUTPATIENT
Start: 2023-08-21 | End: 2023-08-22

## 2023-08-21 RX ORDER — ACETAMINOPHEN 325 MG/1
650 TABLET ORAL EVERY 4 HOURS PRN
Status: DISCONTINUED | OUTPATIENT
Start: 2023-08-21 | End: 2023-08-23 | Stop reason: HOSPADM

## 2023-08-21 RX ORDER — INSULIN LISPRO 100 [IU]/ML
0-4 INJECTION, SOLUTION INTRAVENOUS; SUBCUTANEOUS NIGHTLY
Status: DISCONTINUED | OUTPATIENT
Start: 2023-08-21 | End: 2023-08-23 | Stop reason: HOSPADM

## 2023-08-21 RX ORDER — POTASSIUM CHLORIDE 20 MEQ/1
20 TABLET, EXTENDED RELEASE ORAL ONCE
Status: COMPLETED | OUTPATIENT
Start: 2023-08-21 | End: 2023-08-21

## 2023-08-21 RX ORDER — SODIUM CHLORIDE 0.9 % (FLUSH) 0.9 %
5-40 SYRINGE (ML) INJECTION EVERY 12 HOURS SCHEDULED
Status: DISCONTINUED | OUTPATIENT
Start: 2023-08-21 | End: 2023-08-23 | Stop reason: HOSPADM

## 2023-08-21 RX ORDER — MORPHINE SULFATE 4 MG/ML
4 INJECTION, SOLUTION INTRAMUSCULAR; INTRAVENOUS EVERY 8 HOURS PRN
Status: DISCONTINUED | OUTPATIENT
Start: 2023-08-21 | End: 2023-08-23 | Stop reason: HOSPADM

## 2023-08-21 RX ORDER — ONDANSETRON 2 MG/ML
4 INJECTION INTRAMUSCULAR; INTRAVENOUS ONCE
Status: COMPLETED | OUTPATIENT
Start: 2023-08-21 | End: 2023-08-22

## 2023-08-21 RX ORDER — ONDANSETRON 2 MG/ML
INJECTION INTRAMUSCULAR; INTRAVENOUS
Status: COMPLETED
Start: 2023-08-21 | End: 2023-08-21

## 2023-08-21 RX ORDER — SODIUM CHLORIDE 9 MG/ML
INJECTION, SOLUTION INTRAVENOUS PRN
Status: DISCONTINUED | OUTPATIENT
Start: 2023-08-21 | End: 2023-08-23 | Stop reason: HOSPADM

## 2023-08-21 RX ADMIN — Medication 5 ML: at 10:25

## 2023-08-21 RX ADMIN — MORPHINE SULFATE 4 MG: 4 INJECTION, SOLUTION INTRAMUSCULAR; INTRAVENOUS at 08:50

## 2023-08-21 RX ADMIN — SODIUM CHLORIDE, PRESERVATIVE FREE 10 ML: 5 INJECTION INTRAVENOUS at 17:50

## 2023-08-21 RX ADMIN — POTASSIUM CHLORIDE, DEXTROSE MONOHYDRATE AND SODIUM CHLORIDE: 150; 5; 450 INJECTION, SOLUTION INTRAVENOUS at 01:03

## 2023-08-21 RX ADMIN — POTASSIUM CHLORIDE 20 MEQ: 1500 TABLET, EXTENDED RELEASE ORAL at 01:16

## 2023-08-21 RX ADMIN — METRONIDAZOLE 500 MG: 500 TABLET ORAL at 21:13

## 2023-08-21 RX ADMIN — METRONIDAZOLE 500 MG: 500 TABLET ORAL at 06:06

## 2023-08-21 RX ADMIN — Medication 10 ML: at 19:54

## 2023-08-21 RX ADMIN — PANTOPRAZOLE SODIUM 40 MG: 40 TABLET, DELAYED RELEASE ORAL at 06:06

## 2023-08-21 RX ADMIN — INSULIN GLARGINE 22 UNITS: 100 INJECTION, SOLUTION SUBCUTANEOUS at 08:00

## 2023-08-21 RX ADMIN — SODIUM PHOSPHATE, MONOBASIC, MONOHYDRATE AND SODIUM PHOSPHATE, DIBASIC, ANHYDROUS 10 MMOL: 142; 276 INJECTION, SOLUTION INTRAVENOUS at 02:08

## 2023-08-21 RX ADMIN — MORPHINE SULFATE 4 MG: 4 INJECTION, SOLUTION INTRAMUSCULAR; INTRAVENOUS at 04:36

## 2023-08-21 RX ADMIN — ONDANSETRON 4 MG: 2 INJECTION INTRAMUSCULAR; INTRAVENOUS at 08:08

## 2023-08-21 RX ADMIN — METRONIDAZOLE 500 MG: 500 TABLET ORAL at 14:31

## 2023-08-21 RX ADMIN — MORPHINE SULFATE 4 MG: 4 INJECTION, SOLUTION INTRAMUSCULAR; INTRAVENOUS at 00:06

## 2023-08-21 RX ADMIN — MAGNESIUM SULFATE HEPTAHYDRATE 2000 MG: 40 INJECTION, SOLUTION INTRAVENOUS at 01:07

## 2023-08-21 RX ADMIN — INSULIN LISPRO 4 UNITS: 100 INJECTION, SOLUTION INTRAVENOUS; SUBCUTANEOUS at 16:48

## 2023-08-21 RX ADMIN — INSULIN LISPRO 4 UNITS: 100 INJECTION, SOLUTION INTRAVENOUS; SUBCUTANEOUS at 12:27

## 2023-08-21 RX ADMIN — WATER 1000 MG: 1 INJECTION INTRAMUSCULAR; INTRAVENOUS; SUBCUTANEOUS at 12:27

## 2023-08-21 RX ADMIN — MORPHINE SULFATE 4 MG: 4 INJECTION, SOLUTION INTRAMUSCULAR; INTRAVENOUS at 17:49

## 2023-08-21 ASSESSMENT — PAIN SCALES - GENERAL
PAINLEVEL_OUTOF10: 0
PAINLEVEL_OUTOF10: 8
PAINLEVEL_OUTOF10: 0

## 2023-08-21 ASSESSMENT — PAIN DESCRIPTION - LOCATION: LOCATION: BACK

## 2023-08-21 NOTE — FLOWSHEET NOTE
Report phoned to [facility]. Bed not clean yet. Will transfer patient once bed clean.

## 2023-08-21 NOTE — PATIENT CARE CONFERENCE
Intensive Care Daily Quality Rounding Checklist        ICU Team Members:  Dr. Starr Yen, Dr. Ayanna Salazar (resident), charge nurse, bedside nurse, respiratory therapist    ICU Day #: NUMBER: 2     Intubation Date: N/A     Ventilator Day #: N/A     Central Line Insertion Date:  N/A                                                    Day #: N/A                                                    Indication: N/A      Arterial Line Insertion Date:  N/A                             Day #: N/A     Temporary Hemodialysis Catheter Insertion Date:  N/A                             Day # N/A     DVT Prophylaxis: none    GI Prophylaxis: Protonix            Bey Catheter Insertion Date:  N/A                                        Day #: N/A                             Indications: N/A                             Continued need (if yes, reason documented and discussed with physician): N/A     Skin Issues/ Wounds and ordered treatment discussed on rounds: no issues     Goals/ Plans for the Day: Daily labs, DKA protocol, bridge this am with breakfast, up ad jeremi, transfer to floor

## 2023-08-22 LAB
ANION GAP SERPL CALCULATED.3IONS-SCNC: 7 MMOL/L (ref 7–16)
BASOPHILS # BLD: 0.03 K/UL (ref 0–0.2)
BASOPHILS NFR BLD: 0 % (ref 0–2)
BUN SERPL-MCNC: 5 MG/DL (ref 6–20)
C TRACH DNA SPEC QL PROBE+SIG AMP: NEGATIVE
CALCIUM SERPL-MCNC: 8.2 MG/DL (ref 8.6–10.2)
CHLORIDE SERPL-SCNC: 113 MMOL/L (ref 98–107)
CO2 SERPL-SCNC: 19 MMOL/L (ref 22–29)
CREAT SERPL-MCNC: 0.6 MG/DL (ref 0.5–1)
EOSINOPHIL # BLD: 0.04 K/UL (ref 0.05–0.5)
EOSINOPHILS RELATIVE PERCENT: 0 % (ref 0–6)
ERYTHROCYTE [DISTWIDTH] IN BLOOD BY AUTOMATED COUNT: 13.5 % (ref 11.5–15)
GFR SERPL CREATININE-BSD FRML MDRD: >60 ML/MIN/1.73M2
GLUCOSE BLD-MCNC: 125 MG/DL (ref 74–99)
GLUCOSE BLD-MCNC: 127 MG/DL (ref 74–99)
GLUCOSE BLD-MCNC: 210 MG/DL (ref 74–99)
GLUCOSE BLD-MCNC: 217 MG/DL (ref 74–99)
GLUCOSE BLD-MCNC: 76 MG/DL (ref 74–99)
GLUCOSE BLD-MCNC: 98 MG/DL (ref 74–99)
GLUCOSE SERPL-MCNC: 126 MG/DL (ref 74–99)
HCT VFR BLD AUTO: 29.1 % (ref 34–48)
HGB BLD-MCNC: 9.7 G/DL (ref 11.5–15.5)
IMM GRANULOCYTES # BLD AUTO: 0.07 K/UL (ref 0–0.58)
IMM GRANULOCYTES NFR BLD: 1 % (ref 0–5)
LYMPHOCYTES NFR BLD: 2.8 K/UL (ref 1.5–4)
LYMPHOCYTES RELATIVE PERCENT: 23 % (ref 20–42)
MAGNESIUM SERPL-MCNC: 2.3 MG/DL (ref 1.6–2.6)
MCH RBC QN AUTO: 27.3 PG (ref 26–35)
MCHC RBC AUTO-ENTMCNC: 33.3 G/DL (ref 32–34.5)
MCV RBC AUTO: 82 FL (ref 80–99.9)
MONOCYTES NFR BLD: 0.66 K/UL (ref 0.1–0.95)
MONOCYTES NFR BLD: 5 % (ref 2–12)
N GONORRHOEA DNA SPEC QL PROBE+SIG AMP: NEGATIVE
NEUTROPHILS NFR BLD: 71 % (ref 43–80)
NEUTS SEG NFR BLD: 8.59 K/UL (ref 1.8–7.3)
PHOSPHATE SERPL-MCNC: 1.8 MG/DL (ref 2.5–4.5)
PLATELET # BLD AUTO: 242 K/UL (ref 130–450)
PMV BLD AUTO: 10.6 FL (ref 7–12)
POTASSIUM SERPL-SCNC: 4 MMOL/L (ref 3.5–5)
RBC # BLD AUTO: 3.55 M/UL (ref 3.5–5.5)
SODIUM SERPL-SCNC: 139 MMOL/L (ref 132–146)
SPECIMEN DESCRIPTION: NORMAL
WBC OTHER # BLD: 12.2 K/UL (ref 4.5–11.5)

## 2023-08-22 PROCEDURE — 84100 ASSAY OF PHOSPHORUS: CPT

## 2023-08-22 PROCEDURE — 6370000000 HC RX 637 (ALT 250 FOR IP): Performed by: STUDENT IN AN ORGANIZED HEALTH CARE EDUCATION/TRAINING PROGRAM

## 2023-08-22 PROCEDURE — 2580000003 HC RX 258: Performed by: INTERNAL MEDICINE

## 2023-08-22 PROCEDURE — 6370000000 HC RX 637 (ALT 250 FOR IP): Performed by: INTERNAL MEDICINE

## 2023-08-22 PROCEDURE — 85025 COMPLETE CBC W/AUTO DIFF WBC: CPT

## 2023-08-22 PROCEDURE — 6360000002 HC RX W HCPCS: Performed by: STUDENT IN AN ORGANIZED HEALTH CARE EDUCATION/TRAINING PROGRAM

## 2023-08-22 PROCEDURE — 80048 BASIC METABOLIC PNL TOTAL CA: CPT

## 2023-08-22 PROCEDURE — 6370000000 HC RX 637 (ALT 250 FOR IP)

## 2023-08-22 PROCEDURE — 82962 GLUCOSE BLOOD TEST: CPT

## 2023-08-22 PROCEDURE — 99232 SBSQ HOSP IP/OBS MODERATE 35: CPT | Performed by: INTERNAL MEDICINE

## 2023-08-22 PROCEDURE — 83735 ASSAY OF MAGNESIUM: CPT

## 2023-08-22 PROCEDURE — 6360000002 HC RX W HCPCS: Performed by: INTERNAL MEDICINE

## 2023-08-22 PROCEDURE — 1200000000 HC SEMI PRIVATE

## 2023-08-22 RX ORDER — AMLODIPINE BESYLATE 5 MG/1
5 TABLET ORAL DAILY
Status: DISCONTINUED | OUTPATIENT
Start: 2023-08-22 | End: 2023-08-23 | Stop reason: HOSPADM

## 2023-08-22 RX ORDER — LABETALOL HYDROCHLORIDE 5 MG/ML
5 INJECTION, SOLUTION INTRAVENOUS ONCE
Status: COMPLETED | OUTPATIENT
Start: 2023-08-22 | End: 2023-08-22

## 2023-08-22 RX ORDER — INSULIN LISPRO 100 [IU]/ML
0-6 INJECTION, SOLUTION INTRAVENOUS; SUBCUTANEOUS
Status: DISCONTINUED | OUTPATIENT
Start: 2023-08-23 | End: 2023-08-23 | Stop reason: HOSPADM

## 2023-08-22 RX ORDER — INSULIN GLARGINE 100 [IU]/ML
12 INJECTION, SOLUTION SUBCUTANEOUS NIGHTLY
Status: DISCONTINUED | OUTPATIENT
Start: 2023-08-22 | End: 2023-08-23 | Stop reason: HOSPADM

## 2023-08-22 RX ORDER — INSULIN LISPRO 100 [IU]/ML
3 INJECTION, SOLUTION INTRAVENOUS; SUBCUTANEOUS
Status: DISCONTINUED | OUTPATIENT
Start: 2023-08-23 | End: 2023-08-23 | Stop reason: HOSPADM

## 2023-08-22 RX ADMIN — AMLODIPINE BESYLATE 5 MG: 5 TABLET ORAL at 18:03

## 2023-08-22 RX ADMIN — LABETALOL HYDROCHLORIDE 5 MG: 5 INJECTION INTRAVENOUS at 23:36

## 2023-08-22 RX ADMIN — METRONIDAZOLE 500 MG: 500 TABLET ORAL at 21:59

## 2023-08-22 RX ADMIN — MORPHINE SULFATE 4 MG: 4 INJECTION, SOLUTION INTRAMUSCULAR; INTRAVENOUS at 10:24

## 2023-08-22 RX ADMIN — INSULIN GLARGINE 12 UNITS: 100 INJECTION, SOLUTION SUBCUTANEOUS at 21:56

## 2023-08-22 RX ADMIN — POTASSIUM & SODIUM PHOSPHATES POWDER PACK 280-160-250 MG 250 MG: 280-160-250 PACK at 21:59

## 2023-08-22 RX ADMIN — METRONIDAZOLE 500 MG: 500 TABLET ORAL at 14:13

## 2023-08-22 RX ADMIN — INSULIN LISPRO 2 UNITS: 100 INJECTION, SOLUTION INTRAVENOUS; SUBCUTANEOUS at 17:36

## 2023-08-22 RX ADMIN — ONDANSETRON 4 MG: 2 INJECTION INTRAMUSCULAR; INTRAVENOUS at 17:19

## 2023-08-22 RX ADMIN — WATER 1000 MG: 1 INJECTION INTRAMUSCULAR; INTRAVENOUS; SUBCUTANEOUS at 10:24

## 2023-08-22 RX ADMIN — MORPHINE SULFATE 4 MG: 4 INJECTION, SOLUTION INTRAMUSCULAR; INTRAVENOUS at 01:51

## 2023-08-22 RX ADMIN — MORPHINE SULFATE 4 MG: 4 INJECTION, SOLUTION INTRAMUSCULAR; INTRAVENOUS at 17:20

## 2023-08-22 RX ADMIN — PANTOPRAZOLE SODIUM 40 MG: 40 TABLET, DELAYED RELEASE ORAL at 06:37

## 2023-08-22 RX ADMIN — METRONIDAZOLE 500 MG: 500 TABLET ORAL at 06:36

## 2023-08-22 RX ADMIN — Medication 10 ML: at 21:59

## 2023-08-22 ASSESSMENT — PAIN DESCRIPTION - LOCATION: LOCATION: BACK

## 2023-08-22 ASSESSMENT — PAIN DESCRIPTION - DESCRIPTORS: DESCRIPTORS: ACHING;DISCOMFORT

## 2023-08-22 ASSESSMENT — PAIN SCALES - GENERAL
PAINLEVEL_OUTOF10: 8
PAINLEVEL_OUTOF10: 7
PAINLEVEL_OUTOF10: 8

## 2023-08-22 NOTE — PLAN OF CARE
Problem: Discharge Planning  Goal: Discharge to home or other facility with appropriate resources  8/22/2023 0022 by Henry Song RN  Outcome: Progressing  Flowsheets (Taken 8/21/2023 2000 by Thania Valdez RN)  Discharge to home or other facility with appropriate resources:   Arrange for needed discharge resources and transportation as appropriate   Identify barriers to discharge with patient and caregiver  8/21/2023 1821 by Janiya Cui RN  Outcome: Progressing     Problem: Pain  Goal: Verbalizes/displays adequate comfort level or baseline comfort level  8/22/2023 0022 by Henry Song RN  Outcome: Progressing  Flowsheets (Taken 8/21/2023 2000 by Thania Valdez RN)  Verbalizes/displays adequate comfort level or baseline comfort level:   Encourage patient to monitor pain and request assistance   Assess pain using appropriate pain scale  8/21/2023 1821 by Janiya Cui RN  Outcome: Progressing     Problem: Chronic Conditions and Co-morbidities  Goal: Patient's chronic conditions and co-morbidity symptoms are monitored and maintained or improved  8/22/2023 0022 by Henry Song RN  Outcome: Progressing  Flowsheets (Taken 8/21/2023 2000 by Thania Valdez RN)  Care Plan - Patient's Chronic Conditions and Co-Morbidity Symptoms are Monitored and Maintained or Improved:   Monitor and assess patient's chronic conditions and comorbid symptoms for stability, deterioration, or improvement   Collaborate with multidisciplinary team to address chronic and comorbid conditions and prevent exacerbation or deterioration  8/21/2023 1821 by Janiya Cui RN  Outcome: Progressing     Problem: Safety - Adult  Goal: Free from fall injury  8/22/2023 0022 by Henry Song RN  Outcome: Progressing  8/21/2023 1821 by Janiya Cui RN  Outcome: Progressing

## 2023-08-22 NOTE — CARE COORDINATION
Social Work discharge planning    Sw met with pt, who lives with her mom. She said she is independent with ambulation and adls. She drives. She said she will have a ride home at discharge. She said she missed appt with PCP Dr Zhen Alex, but plans to follow up with her at discharge. Pt uses [address] She denied any discharge planning needs. Social Work to follow for support and discharge planning.    Electronically signed by Dean Martinez on 8/22/2023 at 10:34 AM

## 2023-08-22 NOTE — PATIENT CARE CONFERENCE
P Quality Flow/Interdisciplinary Rounds Progress Note        Quality Flow Rounds held on August 22, 2023    Disciplines Attending:  Bedside Nurse, , , and Nursing Unit Leadership    Jenna Chase was admitted on 8/19/2023  7:46 PM    Anticipated Discharge Date:       Disposition:    Gavino Score:  Gavino Scale Score: 20    Readmission Risk              Risk of Unplanned Readmission:  22           Discussed patient goal for the day, patient clinical progression, and barriers to discharge.   The following Goal(s) of the Day/Commitment(s) have been identified:  Discharge - Obtain Order      Derek Rizvi RN  August 22, 2023

## 2023-08-23 VITALS
HEART RATE: 109 BPM | BODY MASS INDEX: 19.63 KG/M2 | WEIGHT: 104 LBS | SYSTOLIC BLOOD PRESSURE: 135 MMHG | OXYGEN SATURATION: 100 % | DIASTOLIC BLOOD PRESSURE: 94 MMHG | HEIGHT: 61 IN | TEMPERATURE: 97.9 F | RESPIRATION RATE: 16 BRPM

## 2023-08-23 LAB
ANION GAP SERPL CALCULATED.3IONS-SCNC: 9 MMOL/L (ref 7–16)
BASOPHILS # BLD: 0.03 K/UL (ref 0–0.2)
BASOPHILS NFR BLD: 0 % (ref 0–2)
BUN SERPL-MCNC: 5 MG/DL (ref 6–20)
CALCIUM SERPL-MCNC: 8.7 MG/DL (ref 8.6–10.2)
CHLORIDE SERPL-SCNC: 104 MMOL/L (ref 98–107)
CO2 SERPL-SCNC: 23 MMOL/L (ref 22–29)
CREAT SERPL-MCNC: 0.7 MG/DL (ref 0.5–1)
EOSINOPHIL # BLD: 0.04 K/UL (ref 0.05–0.5)
EOSINOPHILS RELATIVE PERCENT: 1 % (ref 0–6)
ERYTHROCYTE [DISTWIDTH] IN BLOOD BY AUTOMATED COUNT: 13.3 % (ref 11.5–15)
GFR SERPL CREATININE-BSD FRML MDRD: >60 ML/MIN/1.73M2
GLUCOSE BLD-MCNC: 172 MG/DL (ref 74–99)
GLUCOSE BLD-MCNC: 213 MG/DL (ref 74–99)
GLUCOSE BLD-MCNC: 91 MG/DL (ref 74–99)
GLUCOSE SERPL-MCNC: 201 MG/DL (ref 74–99)
HCT VFR BLD AUTO: 34.4 % (ref 34–48)
HGB BLD-MCNC: 11.6 G/DL (ref 11.5–15.5)
IMM GRANULOCYTES # BLD AUTO: <0.03 K/UL (ref 0–0.58)
IMM GRANULOCYTES NFR BLD: 0 % (ref 0–5)
LYMPHOCYTES NFR BLD: 2.11 K/UL (ref 1.5–4)
LYMPHOCYTES RELATIVE PERCENT: 28 % (ref 20–42)
MAGNESIUM SERPL-MCNC: 2 MG/DL (ref 1.6–2.6)
MCH RBC QN AUTO: 27.5 PG (ref 26–35)
MCHC RBC AUTO-ENTMCNC: 33.7 G/DL (ref 32–34.5)
MCV RBC AUTO: 81.5 FL (ref 80–99.9)
MONOCYTES NFR BLD: 0.39 K/UL (ref 0.1–0.95)
MONOCYTES NFR BLD: 5 % (ref 2–12)
NEUTROPHILS NFR BLD: 65 % (ref 43–80)
NEUTS SEG NFR BLD: 4.86 K/UL (ref 1.8–7.3)
PHOSPHATE SERPL-MCNC: 2.5 MG/DL (ref 2.5–4.5)
PLATELET # BLD AUTO: 238 K/UL (ref 130–450)
PMV BLD AUTO: 10.2 FL (ref 7–12)
POTASSIUM SERPL-SCNC: 3.9 MMOL/L (ref 3.5–5)
RBC # BLD AUTO: 4.22 M/UL (ref 3.5–5.5)
SODIUM SERPL-SCNC: 136 MMOL/L (ref 132–146)
WBC OTHER # BLD: 7.5 K/UL (ref 4.5–11.5)

## 2023-08-23 PROCEDURE — 83735 ASSAY OF MAGNESIUM: CPT

## 2023-08-23 PROCEDURE — 6370000000 HC RX 637 (ALT 250 FOR IP): Performed by: STUDENT IN AN ORGANIZED HEALTH CARE EDUCATION/TRAINING PROGRAM

## 2023-08-23 PROCEDURE — 80048 BASIC METABOLIC PNL TOTAL CA: CPT

## 2023-08-23 PROCEDURE — 99239 HOSP IP/OBS DSCHRG MGMT >30: CPT | Performed by: INTERNAL MEDICINE

## 2023-08-23 PROCEDURE — 6370000000 HC RX 637 (ALT 250 FOR IP)

## 2023-08-23 PROCEDURE — 84100 ASSAY OF PHOSPHORUS: CPT

## 2023-08-23 PROCEDURE — 6370000000 HC RX 637 (ALT 250 FOR IP): Performed by: INTERNAL MEDICINE

## 2023-08-23 PROCEDURE — 6360000002 HC RX W HCPCS: Performed by: INTERNAL MEDICINE

## 2023-08-23 PROCEDURE — 85025 COMPLETE CBC W/AUTO DIFF WBC: CPT

## 2023-08-23 PROCEDURE — 2580000003 HC RX 258: Performed by: INTERNAL MEDICINE

## 2023-08-23 PROCEDURE — 82962 GLUCOSE BLOOD TEST: CPT

## 2023-08-23 PROCEDURE — 99232 SBSQ HOSP IP/OBS MODERATE 35: CPT | Performed by: INTERNAL MEDICINE

## 2023-08-23 RX ADMIN — POTASSIUM & SODIUM PHOSPHATES POWDER PACK 280-160-250 MG 250 MG: 280-160-250 PACK at 09:58

## 2023-08-23 RX ADMIN — MORPHINE SULFATE 4 MG: 4 INJECTION, SOLUTION INTRAMUSCULAR; INTRAVENOUS at 01:59

## 2023-08-23 RX ADMIN — MORPHINE SULFATE 4 MG: 4 INJECTION, SOLUTION INTRAMUSCULAR; INTRAVENOUS at 09:58

## 2023-08-23 RX ADMIN — METRONIDAZOLE 500 MG: 500 TABLET ORAL at 06:31

## 2023-08-23 RX ADMIN — METRONIDAZOLE 500 MG: 500 TABLET ORAL at 13:35

## 2023-08-23 RX ADMIN — MORPHINE SULFATE 4 MG: 4 INJECTION, SOLUTION INTRAMUSCULAR; INTRAVENOUS at 16:06

## 2023-08-23 RX ADMIN — INSULIN LISPRO 3 UNITS: 100 INJECTION, SOLUTION INTRAVENOUS; SUBCUTANEOUS at 07:21

## 2023-08-23 RX ADMIN — WATER 1000 MG: 1 INJECTION INTRAMUSCULAR; INTRAVENOUS; SUBCUTANEOUS at 13:35

## 2023-08-23 RX ADMIN — AMLODIPINE BESYLATE 5 MG: 5 TABLET ORAL at 09:58

## 2023-08-23 RX ADMIN — INSULIN LISPRO 2 UNITS: 100 INJECTION, SOLUTION INTRAVENOUS; SUBCUTANEOUS at 07:23

## 2023-08-23 RX ADMIN — PANTOPRAZOLE SODIUM 40 MG: 40 TABLET, DELAYED RELEASE ORAL at 06:32

## 2023-08-23 ASSESSMENT — PAIN DESCRIPTION - DESCRIPTORS: DESCRIPTORS: SHARP

## 2023-08-23 ASSESSMENT — PAIN SCALES - GENERAL
PAINLEVEL_OUTOF10: 8
PAINLEVEL_OUTOF10: 7
PAINLEVEL_OUTOF10: 8

## 2023-08-23 ASSESSMENT — PAIN - FUNCTIONAL ASSESSMENT: PAIN_FUNCTIONAL_ASSESSMENT: ACTIVITIES ARE NOT PREVENTED

## 2023-08-23 ASSESSMENT — PAIN DESCRIPTION - ORIENTATION: ORIENTATION: LOWER

## 2023-08-23 ASSESSMENT — PAIN DESCRIPTION - LOCATION: LOCATION: BACK

## 2023-08-23 NOTE — DISCHARGE SUMMARY
HISTORY: Pain TECHNOLOGIST PROVIDED HISTORY: Reason for exam:->Pain What reading provider will be dictating this exam?->CRC FINDINGS: Measurements: Uterus: 6.1 x 2.8 x 4.3 cm. Endometrial stripe: Endometrial stripe measures 7 mm. Right Ovary:Right ovary is normal in appearance measuring 2.6 x 2.5 x 2.2 cm. Left Ovary: Left ovary is normal in appearance measuring 2.4 x 2.8 x 2.4 cm. Ultrasound Findings: Uterus: Uterus demonstrates normal myometrial echotexture. Endometrial stripe: Endometrial stripe is within normal limits. Right Ovary: Right ovary is within normal limits. Normal arterial and venous flow. No adnexal masses. Left Ovary:  Left ovary is within normal limits. Normal arterial and venous flow. No adnexal masses. Free Fluid: No evidence of free fluid. Unremarkable pelvic ultrasound. XR CHEST PORTABLE    Result Date: 8/20/2023  EXAMINATION: ONE XRAY VIEW OF THE CHEST 8/20/2023 3:17 am COMPARISON: None. HISTORY: ORDERING SYSTEM PROVIDED HISTORY: s/p centrqal line TECHNOLOGIST PROVIDED HISTORY: Reason for exam:->s/p centrqal line     Cardiomediastinal silhouette is within normal limits for size. No focal consolidation, sizeable pleural effusion, or gross pneumothorax. XR CHEST PORTABLE    Result Date: 8/19/2023  EXAMINATION: ONE XRAY VIEW OF THE CHEST 8/19/2023 10:12 pm COMPARISON: Chest one view, 03/06/2023; chest one view, 12/06/2022 HISTORY: ORDERING SYSTEM PROVIDED HISTORY: SOB TECHNOLOGIST PROVIDED HISTORY: Reason for exam:->SOB FINDINGS: Lines, tubes, and devices: None. Lungs and pleura: No focal consolidation. No pleural effusion. No pneumothorax. Cardiomediastinal silhouette: The heart size is normal. Bones and soft tissues: Unremarkable. No acute process.  Stable exam.       Patient Instructions:      Medication List        CONTINUE taking these medications      amLODIPine 5 MG tablet  Commonly known as: Norvasc  Take 1 tablet by mouth daily     BD Pen Needle Camila U/F 32G X 4 MM

## 2023-08-23 NOTE — PATIENT CARE CONFERENCE
P Quality Flow/Interdisciplinary Rounds Progress Note        Quality Flow Rounds held on August 23, 2023    Disciplines Attending:  Bedside Nurse, , , and Nursing Unit Leadership    Fany Farris was admitted on 8/19/2023  7:46 PM    Anticipated Discharge Date:       Disposition:    Gavino Score:  Gavino Scale Score: 21    Readmission Risk              Risk of Unplanned Readmission:  18           Discussed patient goal for the day, patient clinical progression, and barriers to discharge.   The following Goal(s) of the Day/Commitment(s) have been identified:  Discharge - Obtain Order      Nichol Herrmann RN  August 23, 2023

## 2023-08-25 LAB
MICROORGANISM SPEC CULT: NORMAL
SERVICE CMNT-IMP: NORMAL
SPECIMEN DESCRIPTION: NORMAL

## 2023-08-30 ENCOUNTER — TELEPHONE (OUTPATIENT)
Dept: ENDOCRINOLOGY | Age: 28
End: 2023-08-30

## 2023-08-30 NOTE — TELEPHONE ENCOUNTER
Pt called and cancelled  her appt for today at 11:30am. Her grandmother just passed this morning. Please contact back to reschedule with Shane Bridges for a HFU.

## 2023-09-20 NOTE — PLAN OF CARE
History of Present Illness: The patient is a 82 y.o. female who presents with complaints of low back pain    Past Medical History:   Diagnosis Date   • Hypertension        Past Surgical History:   Procedure Laterality Date   • EPIDURAL BLOCK INJECTION Left 07/14/2022    Procedure: L5 S1 LUMBAR EPIDURAL STEROID INJECTION (07858); Surgeon: Rachel Alonso MD;  Location: [facility] MAIN OR;  Service: Pain Management    • EPIDURAL BLOCK INJECTION Right 10/21/2022    Procedure: BLOCK / INJECTION EPIDURAL STEROID TRANSFORAMINAL  Right L5/S1 TFESI;  Surgeon: Rachel Alonso MD;  Location: [facility] MAIN OR;  Service: Pain Management    • EPIDURAL BLOCK INJECTION Right 12/7/2022    Procedure: BLOCK / INJECTION EPIDURAL STEROID TRANSFORAMINAL Right L5-S1;  Surgeon: Alan Peralta DO;  Location: [facility] MAIN OR;  Service: Pain Management    • EPIDURAL BLOCK INJECTION Bilateral 6/21/2023    Procedure: L5-S1 TRANSFORAMINAL epidural steroid injection (84670-13); Surgeon: Alan Peralta DO;  Location: [facility] MAIN OR;  Service: Pain Management    • WA INJECT SI JOINT ARTHRGRPHY&/ANES/STEROID W/CIARRA Left 12/28/2022    Procedure: BLOCK / INJECTION SACROILIAC Left SI joint injection;  Surgeon: Alan Peralta DO;  Location: [facility] MAIN OR;  Service: Pain Management    • WISDOM TOOTH EXTRACTION Bilateral        No current facility-administered medications for this encounter.     Allergies   Allergen Reactions   • Aspirin Rash   • Codeine Rash   • Penicillins Rash   • Sulfa Antibiotics Rash       Physical Exam:   Vitals:    09/20/23 0827   BP: 137/68   Pulse: 86   Resp: 18   Temp: (!) 96.9 °F (36.1 °C)   SpO2: 98%     General: Awake, Alert, Oriented x 3, Mood and affect appropriate  Respiratory: Respirations even and unlabored  Cardiovascular: Peripheral pulses intact; no edema  Musculoskeletal Exam: Tenderness palpation bilateral lumbar paraspinals    ASA Score: 2    Patient/Chart Verification  Patient ID Verified: Verbal, Problem: Falls - Risk of:  Goal: Will remain free from falls  Description  Will remain free from falls  Outcome: Met This Shift  Goal: Absence of physical injury  Description  Absence of physical injury  Outcome: Met This Shift Armband  ID Band Applied: Yes  Consents Confirmed: Procedural  H&P( within 30 days) Verified: Yes  Interval H&P(within 24 hr) Complete (required for Outpatients and Surgery Admit only): Yes  Beta Blocker given : N/A  Pre-op Lab/Test Results Available:  In chart  Pregnancy Lab Collected: N/A comment  Does Patient Have a Prosthetic Device/Implant: Yes    Assessment: Lumbar radiculopathy  Plan: Bilateral L5-S1 TFESI

## 2023-09-27 NOTE — PLAN OF CARE
Problem: Wound:  Goal: Signs of wound healing will improve  Description  Signs of wound healing will improve     5/12/2019 1115 by Raghav Saldaña RN  Outcome: Met This Shift     Problem: Pain:  Goal: Pain level will decrease  Description  Pain level will decrease     Pain level will decrease  5/13/2019 0054 by Juan Portillo RN  Outcome: Met This Shift  5/12/2019 1115 by Raghav Saldaña RN  Outcome: Met This Shift     Problem: Infection, Sepsis  Goal: Patient exhibits no signs of infection  Outcome: Met This Shift     Problem: Falls - Risk of:  Goal: Will remain free from falls  Description  Will remain free from falls  5/13/2019 0054 by Juan Portillo RN  Outcome: Met This Shift  5/12/2019 1115 by Raghav Saldaña RN  Outcome: Met This Shift  Goal: Absence of physical injury  Description  Absence of physical injury  5/12/2019 1115 by Raghav Saldaña RN  Outcome: Met This Shift     Problem: Skin Integrity:  Goal: Will show no infection signs and symptoms  Description  Will show no infection signs and symptoms  5/13/2019 0054 by Juan Portillo RN  Outcome: Met This Shift  5/12/2019 1115 by Raghav Saldaña RN  Outcome: Met This Shift  Goal: Absence of new skin breakdown  Description  Absence of new skin breakdown  5/13/2019 0054 by Juan Portillo RN  Outcome: Met This Shift  5/12/2019 1115 by Raghav Saldaña RN  Outcome: Met This Shift     Problem: Pain:  Goal: Pain level will decrease  Description  Pain level will decrease     Pain level will decrease  5/13/2019 0054 by Juan Portillo RN  Outcome: Met This Shift  5/12/2019 1115 by Raghav Saldaña RN  Outcome: Met This Shift  Goal: Control of acute pain  Description  Control of acute pain  Outcome: Met This Shift decreased ability to use legs for bridging/pushing

## 2023-09-28 DIAGNOSIS — E10.65 TYPE 1 DIABETES MELLITUS WITH HYPERGLYCEMIA (HCC): ICD-10-CM

## 2023-09-28 RX ORDER — INSULIN PMP CART,AUT,G6/7,CNTR
EACH SUBCUTANEOUS
Qty: 1 KIT | Refills: 0 | Status: SHIPPED | OUTPATIENT
Start: 2023-09-28

## 2023-09-28 RX ORDER — PROCHLORPERAZINE 25 MG/1
SUPPOSITORY RECTAL
Qty: 3 EACH | Refills: 5 | Status: SHIPPED | OUTPATIENT
Start: 2023-09-28

## 2023-10-12 ENCOUNTER — TELEPHONE (OUTPATIENT)
Dept: ENDOCRINOLOGY | Age: 28
End: 2023-10-12

## 2023-10-12 NOTE — TELEPHONE ENCOUNTER
A prior auth was submitted to [facility] for the pt's Omnipod 5 Introkit and pods

## 2023-10-24 ENCOUNTER — OFFICE VISIT (OUTPATIENT)
Dept: PRIMARY CARE CLINIC | Age: 28
End: 2023-10-24
Payer: COMMERCIAL

## 2023-10-24 VITALS
SYSTOLIC BLOOD PRESSURE: 80 MMHG | WEIGHT: 102 LBS | HEIGHT: 61 IN | BODY MASS INDEX: 19.26 KG/M2 | DIASTOLIC BLOOD PRESSURE: 50 MMHG

## 2023-10-24 DIAGNOSIS — I95.9 HYPOTENSION, UNSPECIFIED HYPOTENSION TYPE: ICD-10-CM

## 2023-10-24 DIAGNOSIS — F51.01 PRIMARY INSOMNIA: ICD-10-CM

## 2023-10-24 DIAGNOSIS — R53.83 OTHER FATIGUE: ICD-10-CM

## 2023-10-24 DIAGNOSIS — R00.0 TACHYCARDIA: Primary | ICD-10-CM

## 2023-10-24 DIAGNOSIS — E10.49 TYPE 1 DIABETES MELLITUS WITH OTHER NEUROLOGIC COMPLICATION (HCC): ICD-10-CM

## 2023-10-24 PROCEDURE — 99204 OFFICE O/P NEW MOD 45 MIN: CPT | Performed by: INTERNAL MEDICINE

## 2023-10-24 PROCEDURE — G8420 CALC BMI NORM PARAMETERS: HCPCS | Performed by: INTERNAL MEDICINE

## 2023-10-24 PROCEDURE — 2022F DILAT RTA XM EVC RTNOPTHY: CPT | Performed by: INTERNAL MEDICINE

## 2023-10-24 PROCEDURE — G8484 FLU IMMUNIZE NO ADMIN: HCPCS | Performed by: INTERNAL MEDICINE

## 2023-10-24 PROCEDURE — G8427 DOCREV CUR MEDS BY ELIG CLIN: HCPCS | Performed by: INTERNAL MEDICINE

## 2023-10-24 PROCEDURE — 3074F SYST BP LT 130 MM HG: CPT | Performed by: INTERNAL MEDICINE

## 2023-10-24 PROCEDURE — 3078F DIAST BP <80 MM HG: CPT | Performed by: INTERNAL MEDICINE

## 2023-10-24 PROCEDURE — 3046F HEMOGLOBIN A1C LEVEL >9.0%: CPT | Performed by: INTERNAL MEDICINE

## 2023-10-24 PROCEDURE — 1036F TOBACCO NON-USER: CPT | Performed by: INTERNAL MEDICINE

## 2023-10-24 RX ORDER — TRAZODONE HYDROCHLORIDE 50 MG/1
50 TABLET ORAL NIGHTLY PRN
Qty: 30 TABLET | Refills: 0 | Status: SHIPPED | OUTPATIENT
Start: 2023-10-24

## 2023-10-24 SDOH — ECONOMIC STABILITY: INCOME INSECURITY: HOW HARD IS IT FOR YOU TO PAY FOR THE VERY BASICS LIKE FOOD, HOUSING, MEDICAL CARE, AND HEATING?: NOT HARD AT ALL

## 2023-10-24 SDOH — ECONOMIC STABILITY: FOOD INSECURITY: WITHIN THE PAST 12 MONTHS, YOU WORRIED THAT YOUR FOOD WOULD RUN OUT BEFORE YOU GOT MONEY TO BUY MORE.: NEVER TRUE

## 2023-10-24 SDOH — ECONOMIC STABILITY: FOOD INSECURITY: WITHIN THE PAST 12 MONTHS, THE FOOD YOU BOUGHT JUST DIDN'T LAST AND YOU DIDN'T HAVE MONEY TO GET MORE.: NEVER TRUE

## 2023-10-24 SDOH — ECONOMIC STABILITY: HOUSING INSECURITY
IN THE LAST 12 MONTHS, WAS THERE A TIME WHEN YOU DID NOT HAVE A STEADY PLACE TO SLEEP OR SLEPT IN A SHELTER (INCLUDING NOW)?: NO

## 2023-10-24 ASSESSMENT — ENCOUNTER SYMPTOMS
ALLERGIC/IMMUNOLOGIC NEGATIVE: 1
DIARRHEA: 0
ABDOMINAL PAIN: 0
SINUS PRESSURE: 0
TROUBLE SWALLOWING: 0
VOMITING: 0
SORE THROAT: 0
COLOR CHANGE: 0
CONSTIPATION: 0
BACK PAIN: 0
RHINORRHEA: 0
BLOOD IN STOOL: 0
NAUSEA: 0
ABDOMINAL DISTENTION: 0

## 2023-10-24 ASSESSMENT — PATIENT HEALTH QUESTIONNAIRE - PHQ9
SUM OF ALL RESPONSES TO PHQ QUESTIONS 1-9: 15
8. MOVING OR SPEAKING SO SLOWLY THAT OTHER PEOPLE COULD HAVE NOTICED. OR THE OPPOSITE, BEING SO FIGETY OR RESTLESS THAT YOU HAVE BEEN MOVING AROUND A LOT MORE THAN USUAL: 0
SUM OF ALL RESPONSES TO PHQ QUESTIONS 1-9: 15
6. FEELING BAD ABOUT YOURSELF - OR THAT YOU ARE A FAILURE OR HAVE LET YOURSELF OR YOUR FAMILY DOWN: 0
SUM OF ALL RESPONSES TO PHQ9 QUESTIONS 1 & 2: 3
1. LITTLE INTEREST OR PLEASURE IN DOING THINGS: 3
2. FEELING DOWN, DEPRESSED OR HOPELESS: 0
SUM OF ALL RESPONSES TO PHQ QUESTIONS 1-9: 15
9. THOUGHTS THAT YOU WOULD BE BETTER OFF DEAD, OR OF HURTING YOURSELF: 0
7. TROUBLE CONCENTRATING ON THINGS, SUCH AS READING THE NEWSPAPER OR WATCHING TELEVISION: 3
5. POOR APPETITE OR OVEREATING: 3
10. IF YOU CHECKED OFF ANY PROBLEMS, HOW DIFFICULT HAVE THESE PROBLEMS MADE IT FOR YOU TO DO YOUR WORK, TAKE CARE OF THINGS AT HOME, OR GET ALONG WITH OTHER PEOPLE: 3
SUM OF ALL RESPONSES TO PHQ QUESTIONS 1-9: 15
3. TROUBLE FALLING OR STAYING ASLEEP: 3
4. FEELING TIRED OR HAVING LITTLE ENERGY: 3

## 2023-10-24 ASSESSMENT — COLUMBIA-SUICIDE SEVERITY RATING SCALE - C-SSRS
5. HAVE YOU STARTED TO WORK OUT OR WORKED OUT THE DETAILS OF HOW TO KILL YOURSELF? DO YOU INTEND TO CARRY OUT THIS PLAN?: NO
3. HAVE YOU BEEN THINKING ABOUT HOW YOU MIGHT KILL YOURSELF?: NO
7. DID THIS OCCUR IN THE LAST THREE MONTHS: NO
4. HAVE YOU HAD THESE THOUGHTS AND HAD SOME INTENTION OF ACTING ON THEM?: NO

## 2023-10-27 ENCOUNTER — TELEPHONE (OUTPATIENT)
Dept: PRIMARY CARE CLINIC | Age: 28
End: 2023-10-27

## 2023-10-27 NOTE — TELEPHONE ENCOUNTER
Patient was in to see Dr. Jen Dorsey and is requesting a different medication than the Trazadone that was subscribed to her she states she has tried this in the past and it does not work for her. Thank you.

## 2023-10-30 ENCOUNTER — TELEPHONE (OUTPATIENT)
Dept: PRIMARY CARE CLINIC | Age: 28
End: 2023-10-30

## 2023-10-30 NOTE — TELEPHONE ENCOUNTER
----- Message from Brittney Stratton sent at 10/30/2023 12:25 PM EDT -----  Subject: Medication Problem    Medication: traZODone (DESYREL) 50 MG tablet  Dosage: 50mg once daily  Ordering Provider: Main Denny     Question/Problem: pt told pcp this trazadone doesn't work and shes tried   it before and needs something different plus she still hasn't gotten   anything for blood pressure sent in still, call pt back to discuss      Pharmacy: [facility], [address] [place]   [phone] [place] [phone]    ---------------------------------------------------------------------------  --------------  Kelsi Tilley INFO  [phone]; OK to leave message on voicemail  ---------------------------------------------------------------------------  --------------    SCRIPT ANSWERS  Relationship to Patient: Self

## 2023-11-14 ENCOUNTER — TELEPHONE (OUTPATIENT)
Dept: ADMINISTRATIVE | Age: 28
End: 2023-11-14

## 2023-11-14 NOTE — TELEPHONE ENCOUNTER
Pt has COVID and needs to reschedule appt with Lalitha Schmitz.  Office staff was unavailable due to pt care. Please call pt back. Thanks!

## 2023-11-22 NOTE — DISCHARGE SUMMARY
[facility] EMERGENCY SERVICE Physician Discharge Summary       [facility]  [place] [address]  [phone]          Activity level: as abran    Diet: DIET CARB CONTROL; Carb Control: 4 carbs/meal (approximate 1800 kcals/day)    Dispo:home    Condition at discharge: fair          Patient ID:  Virgil Lees  [id number]  16 y.o.  1995    Admit date: 10/8/2020    Discharge date and time:  10/9/2020  6:13 PM    Admission Diagnoses: Active Problems:    DKA, type 1, not at goal [facility])  Resolved Problems:    * No resolved hospital problems. *      Discharge Diagnoses: Active Problems:    DKA, type 1, not at goal [facility])  Resolved Problems:    * No resolved hospital problems. *    Dka/uncontrolled dm type 1  covid 19 infection  Dehydration  Leukocytosis  Elevated lactic acid level  hyperkalemia      Consults:  IP CONSULT TO CRITICAL CARE  IP CONSULT TO IV TEAM  IP CONSULT TO ENDOCRINOLOGY  IP CONSULT TO DIABETES EDUCATOR    Procedures: none    Hospital Course: Patient was admitted with DKA, type 1, not at goal ([address] [E10.10]  DKA, type 1, not at goal ([address] [E10.10]. Patient is a 27 y.o. female who presents to hospital for n/v. Pt has had diarrhea recently. Pt also notes some sob. Pt has had elevated blood sugars. Pt had c/o generalized abd pain. Pt has insulin pump. Pt notes back pain consistent with chronic back pain. Pt denies fevers, chills, cough, constipation, melena, hematochezia, dysuria, hematuria, or change in urination. Pt seen and examined by consultants. Pt had been given insulin gtt and lytes monitored closely. Pt improved and was transitioned to subq insulin. Pt to restart pump tomorrow. On day of discharge, pt denied fevers, chills,n/v. Discharge planning d/w pt. Discussion about covid and about monitoring post discharge. Time given for questions and all questions answered.      Discharge Exam:  Vitals:    10/09/20 0900 10/09/20 1000 10/09/20 1100 10/09/20 1200   BP: 106/70 106/76 (!) 128/97 124/87   Pulse: 116 117 109 107   Resp: 16 22 12 12   Temp:       TempSrc:       SpO2: 100%   100%   Weight:       Height:           Skin: warm and dry, no rash or erythema  Pulmonary/Chest: clear to auscultation bilaterally- no wheezes, rales or rhonchi, normal air movement, no respiratory distress  Cardiovascular: rhythm reg at rate of 20  Abdomen: soft, non-tender, non-distended, normal bowel sounds, no masses or organomegaly  Extremities: no cyanosis, no clubbing and no edema  I/O last 3 completed shifts: In: 5048.4 [P.O.:20; I.V.:4678.4; IV Piggyback:350]  Out: 026 [Urine:875]  I/O this shift:  In: 20 [P.O.:20]  Out: -       LABS:  Recent Labs     10/08/20  2010 10/09/20  0005 10/09/20  0345    139 138   K 4.9 4.1 4.1    110* 109*   CO2 8* 16* 19*   BUN 19 17 16   CREATININE 0.6 0.6 0.6   GLUCOSE 309* 209* 144*   CALCIUM 9.2 9.1 9.0       Recent Labs     10/07/20  1545 10/08/20  1206 10/09/20  0345   WBC 7.2 19.9* 14.9*   RBC 4.48 4.31 3.34*   HGB 12.5 12.0 9.3*   HCT 37.7 37.9 27.8*   MCV 84.2 87.9 83.2   MCH 27.9 27.8 27.8   MCHC 33.2 31.7* 33.5   RDW 12.7 13.1 13.2    339 304   MPV 11.6 11.5 10.8       No results for input(s): POCGLU in the last 72 hours.     CBC with Differential:    Lab Results   Component Value Date    WBC 14.9 10/09/2020    RBC 3.34 10/09/2020    HGB 9.3 10/09/2020    HCT 27.8 10/09/2020     10/09/2020    MCV 83.2 10/09/2020    MCH 27.8 10/09/2020    MCHC 33.5 10/09/2020    RDW 13.2 10/09/2020    NRBC 0.0 05/10/2019    SEGSPCT 58 09/29/2013    METASPCT 1.0 04/29/2020    LYMPHOPCT 23.7 10/09/2020    MONOPCT 8.6 10/09/2020    MYELOPCT 1.0 04/29/2020    BASOPCT 0.3 10/09/2020    MONOSABS 1.28 10/09/2020    LYMPHSABS 3.53 10/09/2020    EOSABS 0.01 10/09/2020    BASOSABS 0.04 10/09/2020     CMP:    Lab Results   Component Value Date     10/09/2020    K 4.1 10/09/2020    K 4.3 10/07/2020     10/09/2020    CO2 19 10/09/2020    BUN 16 10/09/2020    CREATININE 0.6 10/09/2020    GFRAA >60 10/09/2020    LABGLOM >60 10/09/2020    GLUCOSE 144 10/09/2020    PROT 5.9 10/09/2020    LABALBU 3.4 10/09/2020    CALCIUM 9.0 10/09/2020    BILITOT 0.4 10/09/2020    ALKPHOS 67 10/09/2020    AST 19 10/09/2020    ALT 15 10/09/2020     Magnesium:    Lab Results   Component Value Date    MG 2.4 10/09/2020     Phosphorus:    Lab Results   Component Value Date    PHOS 2.7 10/09/2020       Imaging:   XR CHEST PORTABLE   Final Result   No acute cardiopulmonary process identified. Patient Instructions:      Medication List      CONTINUE taking these medications    acetone (urine) test strip  Use daily as directed if bs >250 x2 or illness     * Contour Next Test strip  Generic drug:  blood glucose test strips  Neelima Contour test strips. Checks 4 times/day before meals and at bedtime and as needed for symptoms of irregular blood glucose     * Contour Next Test strip  Generic drug:  blood glucose test strips  Neelima Contour test strips. Checks 4 times/day before meals and at bedtime and as needed for symptoms of irregular blood glucose     doxepin 5 % cream  Commonly known as:  ZONALON  APPLY ONE GRAM (ONE PUMP) EXTERNALLY THREE TIMES A DAY TO PAIN AREA TO LOWER BACK     Insulin Pump - Insulin regular     metoclopramide 10 MG tablet  Commonly known as:  Reglan  Take 1 tablet by mouth 4 times daily     naproxen 250 MG tablet  Commonly known as:  NAPROSYN  Take 1 tablet by mouth 2 times daily (with meals)     NovoLOG 100 UNIT/ML injection vial  Generic drug:  insulin aspart     pantoprazole 40 MG tablet  Commonly known as:  PROTONIX  Take 1 tablet by mouth 2 times daily (before meals)     VersaPro Gel  APPLY ONE GRAM (ONE PUMP) EXTERNALLY FOUR TIMES A DAY TO LOWER BACK         * This list has 2 medication(s) that are the same as other medications prescribed for you.  Read the directions carefully, and ask your doctor or other care provider to review them with you.                   Total time for discharge is 37 min    Signed:  Electronically signed by Trell Preston DO on 10/9/2020 at 6:13 PM POST-OP DIAGNOSIS:  Compression fx, lumbar spine 22-Nov-2023 08:40:24  Renato John   POST-OP DIAGNOSIS:  Compression fx, lumbar spine 22-Nov-2023 08:40:24  Renato John

## 2023-12-29 ENCOUNTER — TELEPHONE (OUTPATIENT)
Dept: ENDOCRINOLOGY | Age: 28
End: 2023-12-29

## 2023-12-29 NOTE — TELEPHONE ENCOUNTER
The pt's prior authorization was resent to [facility] for the pt's Omnipod 5 IntroKit and Pods.  She was denied in the past.

## 2024-01-02 ENCOUNTER — TELEPHONE (OUTPATIENT)
Dept: PRIMARY CARE CLINIC | Age: 29
End: 2024-01-02

## 2024-02-01 NOTE — PROGRESS NOTES
PROGRESS NOTE    Patient Presents with/Seen in Consultation For      *Reason for Consult: Abdominal pain, DKA, possible SMA syndrome  CHIEF COMPLAINT:  Abdominal pain    Subjective:     Patient seen Briana David in bed in no apparent distress. Patient states she feels much better. Pt denies nausea, stating she has mild diffuse abdominal discomfort after eating. Patient states no BM today, positive flatus. Patient complaints of back pain 4/10. Patient Denies nausea, melena, hematochezia, or hematemesis. Review of Systems  Aside from what was mentioned in the PMH and HPI, essentially unremarkable, all others negative. Objective:     /87   Pulse 107   Temp 98.4 °F (36.9 °C) (Oral)   Resp 16   Ht 5' 1\" (1.549 m)   Wt 95 lb (43.1 kg)   SpO2 96%   BMI 17.95 kg/m²     General appearance: alert, awake, laying in bed, and cooperative  Eyes: conjunctiva normal, sclera anicteric. PERRL.   Lungs: clear to auscultation bilaterally  Heart: regular rate and rhythm, no murmur, 2+ pulses; no edema  Abdomen: soft, non-tender; bowel sounds normal; no masses,  no organomegaly  Extremities: extremities without edema  Pulses: 2+ and symmetric  Skin: Skin color, texture, turgor normal.   Neurologic: Grossly normal        insulin lispro (HUMALOG) injection vial 3 Units, TID WC      docusate sodium (COLACE) capsule 200 mg, Daily      bisacodyl (DULCOLAX) EC tablet 10 mg, Daily PRN      orphenadrine (NORFLEX) injection 60 mg, Q12H      HYDROcodone-acetaminophen (NORCO) 5-325 MG per tablet 1 tablet, Q4H PRN      insulin lispro (HUMALOG) injection vial 0-6 Units, TID WC      insulin lispro (HUMALOG) injection vial 0-3 Units, Nightly      glucose (GLUTOSE) 40 % oral gel 15 g, PRN      dextrose 50 % IV solution, PRN      glucagon (rDNA) injection 1 mg, PRN      dextrose 5 % solution, PRN      insulin glargine (LANTUS) injection vial 14 Units, QAM      dextrose 50 % IV solution, PRN      potassium chloride 10 mEq/100 mL IVPB (Peripheral Line), PRN      magnesium sulfate 1 g in dextrose 5% 100 mL IVPB, PRN      sodium phosphate 10 mmol in dextrose 5 % 250 mL IVPB, PRN    Or      sodium phosphate 15 mmol in dextrose 5 % 250 mL IVPB, PRN    Or      sodium phosphate 20 mmol in dextrose 5 % 500 mL IVPB, PRN      metoclopramide (REGLAN) injection 10 mg, Q6H      ondansetron (ZOFRAN) injection 4 mg, Q6H PRN         Data Review  CBC:   Lab Results   Component Value Date    WBC 5.4 12/14/2020    RBC 4.65 12/14/2020    HGB 11.6 12/14/2020    HCT 37.4 12/14/2020    MCV 80.4 12/14/2020    MCH 24.9 12/14/2020    MCHC 31.0 12/14/2020    RDW 14.3 12/14/2020     12/14/2020    MPV 11.2 12/14/2020     CMP:    Lab Results   Component Value Date     12/17/2020    K 4.1 12/17/2020    K 5.3 12/14/2020     12/17/2020    CO2 26 12/17/2020    BUN 11 12/17/2020    CREATININE 0.6 12/17/2020    GFRAA >60 12/17/2020    LABGLOM >60 12/17/2020    GLUCOSE 214 12/17/2020    PROT 8.2 12/14/2020    LABALBU 4.2 12/14/2020    CALCIUM 9.3 12/17/2020    BILITOT 0.3 12/14/2020    ALKPHOS 105 12/14/2020    AST 34 12/14/2020    ALT 33 12/14/2020     Hepatic Function Panel:    Lab Results   Component Value Date    ALKPHOS 105 12/14/2020    ALT 33 12/14/2020    AST 34 12/14/2020    PROT 8.2 12/14/2020    BILITOT 0.3 12/14/2020    BILIDIR <0.2 12/14/2020    IBILI see below 12/14/2020    LABALBU 4.2 12/14/2020     No components found for: CHLPLat  Lab Results   Component Value Date    TRIG 106 01/24/2018   e    LDLCALC 114 (H) 01/24/2018             Date    PROTIME 10.7 12/14/2020    INR 0.9 12/14/2020     IRON:    Lab Results   Component Value Date    IRON 38 10/19/2020     Iron Saturation:  No components found for: PERCENTFE  FERRITIN:    Lab Results   Component Value Date    FERRITIN 64 10/18/2020         Assessment:   Active Problems:  ? Abdominal pain, diffuse - resolved   ? Nausea- resolved  ?  DKA- defer to endocrinology  ? Hyponatremia  ? Anemia, normocytic, normochromic  ? Gastritis  ? Diabetes type 1, uncontrolled    Plan:       ? Continue colace as ordered  ? Recent HIDA and gastric empty study negative  ? IV fluids as per endocrine/PCP  ? Diet per PCP  ? Monitor CMP and CBC daily  ? Medicate for pain and nausea per PCP  ? Defer comorbidities to others  ? Pt scheduled for colon OP 1/2021, will need EGD same time done as OP, pt to call office to schedule  ?  OK to discharge from GI POV when DKA resolves and OK with others      Discussed with Dr. Jennifer Thompson per Dr. Shan Bates APRN-NP-C 12/17/2020  1:26 PM For Dr. Marah Diaz Stable.

## 2024-02-06 ENCOUNTER — OFFICE VISIT (OUTPATIENT)
Dept: PRIMARY CARE CLINIC | Age: 29
End: 2024-02-06
Payer: COMMERCIAL

## 2024-02-06 VITALS
OXYGEN SATURATION: 99 % | HEIGHT: 61 IN | BODY MASS INDEX: 19.83 KG/M2 | HEART RATE: 103 BPM | WEIGHT: 105 LBS | DIASTOLIC BLOOD PRESSURE: 88 MMHG | SYSTOLIC BLOOD PRESSURE: 122 MMHG | TEMPERATURE: 97.7 F

## 2024-02-06 DIAGNOSIS — F51.01 PRIMARY INSOMNIA: ICD-10-CM

## 2024-02-06 DIAGNOSIS — E10.69 TYPE 1 DIABETES MELLITUS WITH OTHER SPECIFIED COMPLICATION (HCC): Primary | ICD-10-CM

## 2024-02-06 PROBLEM — E10.10 DKA, TYPE 1, NOT AT GOAL (HCC): Status: RESOLVED | Noted: 2020-08-31 | Resolved: 2024-02-06

## 2024-02-06 PROBLEM — E10.11 DIABETIC KETOACIDOSIS WITH COMA ASSOCIATED WITH TYPE 1 DIABETES MELLITUS (HCC): Status: RESOLVED | Noted: 2023-05-31 | Resolved: 2024-02-06

## 2024-02-06 PROBLEM — E10.10 DKA, TYPE 1 (HCC): Status: RESOLVED | Noted: 2018-11-12 | Resolved: 2024-02-06

## 2024-02-06 PROBLEM — E11.10 DIABETIC ACIDOSIS WITHOUT COMA (HCC): Status: RESOLVED | Noted: 2020-05-04 | Resolved: 2024-02-06

## 2024-02-06 LAB
CHP ED QC CHECK: ABNORMAL
GLUCOSE BLD-MCNC: 372 MG/DL
HBA1C MFR BLD: 15 %

## 2024-02-06 PROCEDURE — 3079F DIAST BP 80-89 MM HG: CPT | Performed by: INTERNAL MEDICINE

## 2024-02-06 PROCEDURE — 3074F SYST BP LT 130 MM HG: CPT | Performed by: INTERNAL MEDICINE

## 2024-02-06 PROCEDURE — G8420 CALC BMI NORM PARAMETERS: HCPCS | Performed by: INTERNAL MEDICINE

## 2024-02-06 PROCEDURE — 2022F DILAT RTA XM EVC RTNOPTHY: CPT | Performed by: INTERNAL MEDICINE

## 2024-02-06 PROCEDURE — G8427 DOCREV CUR MEDS BY ELIG CLIN: HCPCS | Performed by: INTERNAL MEDICINE

## 2024-02-06 PROCEDURE — 3046F HEMOGLOBIN A1C LEVEL >9.0%: CPT | Performed by: INTERNAL MEDICINE

## 2024-02-06 PROCEDURE — 1036F TOBACCO NON-USER: CPT | Performed by: INTERNAL MEDICINE

## 2024-02-06 PROCEDURE — 83036 HEMOGLOBIN GLYCOSYLATED A1C: CPT | Performed by: INTERNAL MEDICINE

## 2024-02-06 PROCEDURE — G8484 FLU IMMUNIZE NO ADMIN: HCPCS | Performed by: INTERNAL MEDICINE

## 2024-02-06 PROCEDURE — 82962 GLUCOSE BLOOD TEST: CPT | Performed by: INTERNAL MEDICINE

## 2024-02-06 PROCEDURE — 99214 OFFICE O/P EST MOD 30 MIN: CPT | Performed by: INTERNAL MEDICINE

## 2024-02-06 RX ORDER — ZOLPIDEM TARTRATE 10 MG/1
10 TABLET ORAL NIGHTLY PRN
Qty: 30 TABLET | Refills: 2 | Status: SHIPPED | OUTPATIENT
Start: 2024-02-06 | End: 2024-02-06 | Stop reason: SDUPTHER

## 2024-02-06 RX ORDER — ZOLPIDEM TARTRATE 10 MG/1
10 TABLET ORAL NIGHTLY PRN
Qty: 30 TABLET | Refills: 2 | Status: SHIPPED | OUTPATIENT
Start: 2024-02-06 | End: 2024-05-06

## 2024-02-06 ASSESSMENT — PATIENT HEALTH QUESTIONNAIRE - PHQ9
SUM OF ALL RESPONSES TO PHQ QUESTIONS 1-9: 6
7. TROUBLE CONCENTRATING ON THINGS, SUCH AS READING THE NEWSPAPER OR WATCHING TELEVISION: 0
6. FEELING BAD ABOUT YOURSELF - OR THAT YOU ARE A FAILURE OR HAVE LET YOURSELF OR YOUR FAMILY DOWN: 0
SUM OF ALL RESPONSES TO PHQ QUESTIONS 1-9: 6
9. THOUGHTS THAT YOU WOULD BE BETTER OFF DEAD, OR OF HURTING YOURSELF: 0
1. LITTLE INTEREST OR PLEASURE IN DOING THINGS: 0
5. POOR APPETITE OR OVEREATING: 0
4. FEELING TIRED OR HAVING LITTLE ENERGY: 3
SUM OF ALL RESPONSES TO PHQ QUESTIONS 1-9: 6
10. IF YOU CHECKED OFF ANY PROBLEMS, HOW DIFFICULT HAVE THESE PROBLEMS MADE IT FOR YOU TO DO YOUR WORK, TAKE CARE OF THINGS AT HOME, OR GET ALONG WITH OTHER PEOPLE: 1
8. MOVING OR SPEAKING SO SLOWLY THAT OTHER PEOPLE COULD HAVE NOTICED. OR THE OPPOSITE, BEING SO FIGETY OR RESTLESS THAT YOU HAVE BEEN MOVING AROUND A LOT MORE THAN USUAL: 0
SUM OF ALL RESPONSES TO PHQ QUESTIONS 1-9: 6
2. FEELING DOWN, DEPRESSED OR HOPELESS: 0
3. TROUBLE FALLING OR STAYING ASLEEP: 3
SUM OF ALL RESPONSES TO PHQ9 QUESTIONS 1 & 2: 0

## 2024-02-06 NOTE — PROGRESS NOTES
Diana MOSES Mann presents today for follow up of of Diabetes, HTN, Depression    Current Outpatient Medications   Medication Sig Dispense Refill    Insulin Disposable Pump (OMNIPOD 5 G6 INTRO, GEN 5,) KIT To use as directed 1 kit 0    Continuous Blood Gluc Sensor (DEXCOM G6 SENSOR) MISC To change every 10 days 3 each 5    insulin aspart (NOVOLOG FLEXPEN) 100 UNIT/ML injection pen 1:8 + ss 2:50>150 max 50 units a day 15 Adjustable Dose Pre-filled Pen Syringe 5    insulin glargine (LANTUS SOLOSTAR) 100 UNIT/ML injection pen Inject 22 units once a day 9 mL 5    Insulin Pen Needle (BD PEN NEEDLE SHELBIE U/F) 32G X 4 MM MISC Uses with insulin 4 times a day 200 each 5    Continuous Blood Gluc Transmit (DEXCOM G6 TRANSMITTER) MISC To change every 90 days 1 each 3    Insulin Disposable Pump (OMNIPOD 5 G6 POD, GEN 5,) MISC 1 each by Does not apply route every 72 hours 10 each 11     No current facility-administered medications for this visit.      Past Medical History:   Diagnosis Date    Bleeding at insertion site 01/05/2018    Common femoral artery injury, right, initial encounter 01/05/2018    Depressive disorder     Diabetic ketoacidosis (HCC)     DM type 1 (diabetes mellitus, type 1) (HCC)     Marijuana abuse     Non-compliance     Primary hypertension 6/2/2023    Trichimoniasis 11/19/2021          Subjective:  HPI   Review of Systems       Objective:  /88 (Site: Left Upper Arm, Position: Sitting, Cuff Size: Medium Adult)   Pulse (!) 103   Temp 97.7 °F (36.5 °C)   Ht 1.549 m (5' 1\")   Wt 47.6 kg (105 lb)   LMP 01/16/2024 (Exact Date)   SpO2 99%   BMI 19.84 kg/m²      Physical Exam     Assessment:  There are no diagnoses linked to this encounter.

## 2024-02-12 DIAGNOSIS — E10.65 TYPE 1 DIABETES MELLITUS WITH HYPERGLYCEMIA (HCC): Primary | ICD-10-CM

## 2024-02-12 RX ORDER — INSULIN ASPART 100 [IU]/ML
INJECTION, SOLUTION INTRAVENOUS; SUBCUTANEOUS
Qty: 30 ML | Refills: 5 | Status: SHIPPED | OUTPATIENT
Start: 2024-02-12

## 2024-02-16 ENCOUNTER — TELEPHONE (OUTPATIENT)
Dept: PRIMARY CARE CLINIC | Age: 29
End: 2024-02-16

## 2024-02-16 DIAGNOSIS — F51.01 PRIMARY INSOMNIA: ICD-10-CM

## 2024-02-16 DIAGNOSIS — E10.65 TYPE 1 DIABETES MELLITUS WITH HYPERGLYCEMIA (HCC): ICD-10-CM

## 2024-02-16 RX ORDER — ZOLPIDEM TARTRATE 10 MG/1
10 TABLET ORAL NIGHTLY PRN
Qty: 30 TABLET | Refills: 2 | Status: CANCELLED | OUTPATIENT
Start: 2024-02-16 | End: 2024-05-16

## 2024-02-16 NOTE — TELEPHONE ENCOUNTER
Patient requesting a new prescription be sent into [facility] on [address] for her zolpidem (AMBIEN) 10 MG tablet as she is taking 2 pills per night. States she is taking it every night.  States the pharmacy is asking to have the dose or amount of pills upped. Thank you.

## 2024-02-19 RX ORDER — PROCHLORPERAZINE 25 MG/1
SUPPOSITORY RECTAL
Qty: 1 EACH | Refills: 3 | Status: SHIPPED | OUTPATIENT
Start: 2024-02-19

## 2024-02-19 RX ORDER — PROCHLORPERAZINE 25 MG/1
SUPPOSITORY RECTAL
Qty: 3 EACH | Refills: 5 | Status: SHIPPED | OUTPATIENT
Start: 2024-02-19

## 2024-02-21 ENCOUNTER — HOSPITAL ENCOUNTER (EMERGENCY)
Age: 29
Discharge: HOME | End: 2024-02-21
Payer: COMMERCIAL

## 2024-02-21 VITALS
SYSTOLIC BLOOD PRESSURE: 134 MMHG | RESPIRATION RATE: 14 BRPM | HEIGHT: 61 IN | OXYGEN SATURATION: 100 % | WEIGHT: 112 LBS | TEMPERATURE: 97.1 F | BODY MASS INDEX: 21.14 KG/M2 | HEART RATE: 98 BPM | DIASTOLIC BLOOD PRESSURE: 98 MMHG

## 2024-02-21 DIAGNOSIS — K08.89 PAIN, DENTAL: Primary | ICD-10-CM

## 2024-02-21 DIAGNOSIS — J06.9 VIRAL URI WITH COUGH: ICD-10-CM

## 2024-02-21 PROCEDURE — 99283 EMERGENCY DEPT VISIT LOW MDM: CPT

## 2024-02-21 PROCEDURE — 6370000000 HC RX 637 (ALT 250 FOR IP): Performed by: PHYSICIAN ASSISTANT

## 2024-02-21 RX ORDER — NAPROXEN 500 MG/1
500 TABLET ORAL 2 TIMES DAILY
Qty: 60 TABLET | Refills: 0 | Status: SHIPPED | OUTPATIENT
Start: 2024-02-21

## 2024-02-21 RX ORDER — HYDROCODONE BITARTRATE AND ACETAMINOPHEN 5; 325 MG/1; MG/1
1 TABLET ORAL ONCE
Status: DISCONTINUED | OUTPATIENT
Start: 2024-02-21 | End: 2024-02-21

## 2024-02-21 RX ORDER — BROMPHENIRAMINE MALEATE, PSEUDOEPHEDRINE HYDROCHLORIDE, AND DEXTROMETHORPHAN HYDROBROMIDE 2; 30; 10 MG/5ML; MG/5ML; MG/5ML
5 SYRUP ORAL 3 TIMES DAILY PRN
Qty: 250 ML | Refills: 0 | Status: SHIPPED | OUTPATIENT
Start: 2024-02-21

## 2024-02-21 RX ORDER — AMOXICILLIN AND CLAVULANATE POTASSIUM 875; 125 MG/1; MG/1
1 TABLET, FILM COATED ORAL 2 TIMES DAILY
Qty: 14 TABLET | Refills: 0 | Status: SHIPPED | OUTPATIENT
Start: 2024-02-21 | End: 2024-02-28

## 2024-02-21 RX ORDER — NAPROXEN 250 MG/1
500 TABLET ORAL ONCE
Status: COMPLETED | OUTPATIENT
Start: 2024-02-21 | End: 2024-02-21

## 2024-02-21 RX ORDER — AMOXICILLIN AND CLAVULANATE POTASSIUM 875; 125 MG/1; MG/1
1 TABLET, FILM COATED ORAL ONCE
Status: COMPLETED | OUTPATIENT
Start: 2024-02-21 | End: 2024-02-21

## 2024-02-21 RX ADMIN — AMOXICILLIN AND CLAVULANATE POTASSIUM 1 TABLET: 875; 125 TABLET, FILM COATED ORAL at 14:08

## 2024-02-21 RX ADMIN — NAPROXEN 500 MG: 250 TABLET ORAL at 14:08

## 2024-02-21 ASSESSMENT — PAIN DESCRIPTION - DESCRIPTORS: DESCRIPTORS: ACHING

## 2024-02-21 ASSESSMENT — PAIN SCALES - GENERAL: PAINLEVEL_OUTOF10: 8

## 2024-02-21 ASSESSMENT — PAIN DESCRIPTION - ORIENTATION: ORIENTATION: RIGHT;LOWER

## 2024-02-21 ASSESSMENT — PAIN DESCRIPTION - LOCATION: LOCATION: MOUTH

## 2024-02-21 ASSESSMENT — PAIN - FUNCTIONAL ASSESSMENT: PAIN_FUNCTIONAL_ASSESSMENT: 0-10

## 2024-02-21 ASSESSMENT — PAIN DESCRIPTION - FREQUENCY: FREQUENCY: CONTINUOUS

## 2024-02-21 ASSESSMENT — PAIN DESCRIPTION - PAIN TYPE: TYPE: ACUTE PAIN

## 2024-02-21 NOTE — ED PROVIDER NOTES
Independent [facility] Visit.      Department of Emergency Medicine   ED  Provider Note  Admit Date/RoomTime: 2/21/2024  1:50 PM  ED Room: 31/31    CHIEF COMPLAINT:   Chief Complaint   Patient presents with    Cough     Started over weekend    Dental Pain     Started about a month ago.     ---------------------------------HISTORY OF PRESENT ILLNESS-----------------------------------     Diana Mann is a 28 y.o. female presenting to the ED for dental pain that has been ongoing for the past month.  Patient states she does have a crack in her tooth.  She states she tried to do a home feeling but her pain has not improved.  She also reports cough and some congestion.  She denies sore throat, chest pain, shortness of breath, or pain with breathing.  Patient states she does occasionally have body aches.  She denies any fever or chills.  She has no rash, abdominal pain, nausea, vomiting, diarrhea, or recent trauma/injury.  She denies any recent travel.  Patient does not currently have a dentist.  She is alert and oriented x 3 and in no apparent distress at this exam.  Patient is nontoxic-appearing.    PCP: Opal Iyer MD  Dentist: None    Review of Systems:   Pertinent positives and negatives are stated within HPI, all other systems reviewed and are negative.    --------------------------------------------- PAST HISTORY ---------------------------------------------    Past Medical History:  has a past medical history of Bleeding at insertion site, Common femoral artery injury, right, initial encounter, Depressive disorder, Diabetic ketoacidosis (HCC), DM type 1 (diabetes mellitus, type 1) (HCC), Marijuana abuse, Non-compliance, Primary hypertension, and Trichimoniasis.    Past Surgical History:  has a past surgical history that includes Abdomen surgery (N/A, 5/9/2019) and Bartholin gland cyst excision.    Social History:  reports that she has never smoked. She has never used smokeless tobacco. She reports current drug

## 2024-02-27 ENCOUNTER — OFFICE VISIT (OUTPATIENT)
Dept: ENDOCRINOLOGY | Age: 29
End: 2024-02-27
Payer: COMMERCIAL

## 2024-02-27 VITALS — BODY MASS INDEX: 21.17 KG/M2 | RESPIRATION RATE: 18 BRPM | HEIGHT: 61 IN

## 2024-02-27 DIAGNOSIS — E10.65 TYPE 1 DIABETES MELLITUS WITH HYPERGLYCEMIA (HCC): Primary | ICD-10-CM

## 2024-02-27 DIAGNOSIS — E55.9 VITAMIN D DEFICIENCY: ICD-10-CM

## 2024-02-27 DIAGNOSIS — Z91.119 DIETARY NONCOMPLIANCE: ICD-10-CM

## 2024-02-27 DIAGNOSIS — R80.9 MICROALBUMINURIA: ICD-10-CM

## 2024-02-27 DIAGNOSIS — E78.5 HYPERLIPIDEMIA, UNSPECIFIED HYPERLIPIDEMIA TYPE: ICD-10-CM

## 2024-02-27 PROCEDURE — G8420 CALC BMI NORM PARAMETERS: HCPCS | Performed by: FAMILY MEDICINE

## 2024-02-27 PROCEDURE — 1036F TOBACCO NON-USER: CPT | Performed by: FAMILY MEDICINE

## 2024-02-27 PROCEDURE — 99214 OFFICE O/P EST MOD 30 MIN: CPT | Performed by: FAMILY MEDICINE

## 2024-02-27 PROCEDURE — 2022F DILAT RTA XM EVC RTNOPTHY: CPT | Performed by: FAMILY MEDICINE

## 2024-02-27 PROCEDURE — 3046F HEMOGLOBIN A1C LEVEL >9.0%: CPT | Performed by: FAMILY MEDICINE

## 2024-02-27 PROCEDURE — G8427 DOCREV CUR MEDS BY ELIG CLIN: HCPCS | Performed by: FAMILY MEDICINE

## 2024-02-27 PROCEDURE — 95251 CONT GLUC MNTR ANALYSIS I&R: CPT | Performed by: FAMILY MEDICINE

## 2024-02-27 PROCEDURE — G8484 FLU IMMUNIZE NO ADMIN: HCPCS | Performed by: FAMILY MEDICINE

## 2024-02-27 RX ORDER — INSULIN PMP CART,AUT,G6/7,CNTR
1 EACH SUBCUTANEOUS
Qty: 10 EACH | Refills: 11 | Status: CANCELLED | OUTPATIENT
Start: 2024-02-27

## 2024-02-27 RX ORDER — INSULIN ASPART 100 [IU]/ML
INJECTION, SOLUTION INTRAVENOUS; SUBCUTANEOUS
Qty: 30 ML | Refills: 5 | Status: CANCELLED | OUTPATIENT
Start: 2024-02-27

## 2024-02-27 RX ORDER — PROCHLORPERAZINE 25 MG/1
SUPPOSITORY RECTAL
Qty: 3 EACH | Refills: 5 | Status: SHIPPED | OUTPATIENT
Start: 2024-02-27

## 2024-02-27 RX ORDER — PROCHLORPERAZINE 25 MG/1
SUPPOSITORY RECTAL
Qty: 1 EACH | Refills: 3 | Status: SHIPPED | OUTPATIENT
Start: 2024-02-27

## 2024-02-27 NOTE — PROGRESS NOTES
[facility] [employer] [facility] Department of Endocrinology Diabetes and Metabolism   [address] [place] [address]    Phone: [phone]  Fax: [phone]    Date of Service: 2/27/2024  Primary Care Physician: Opal Iyer MD.  Provider: MIKE Foster - CNP      Reason for the visit:  Type 1 DM    History of Present Illness:   The history is provided by the patient. No  was used. Accuracy of the patient data is excellent.  Diana Mann is a very pleasant 28 y.o. female seen today for follow up visit   Last Ov was 8/2023-hospitalized a few weeks after last visit with DKA.    Diana Mann was diagnosed with diabetes at age 13   Previous use of Medtronic pump  Now on OmniPod 5 in manual mode. Just started this week. Did not have Dexcom, so has been in manual mode.    Current pump settings: Basal 0.7, CR 14, ISF 53, Target 120, correct above 120, AIT 3 hours    Manually entering blood sugars:    TIR 33%  Hyperglycemia 60%  Hypoglycemia 7%  Avg glucose 195    Brought Dexcom G6 to office for help applying and linking to pump      Lab Results   Component Value Date/Time    LABA1C 15.0 02/06/2024 12:22 PM    LABA1C 14.2 08/20/2023 12:15 PM    LABA1C 13.7 06/06/2023 03:41 PM    LABA1C 13.3 10/03/2022 05:16 AM     Has low BS upon awakening 2x a week + awareness  Her diabetes complicated with gastroparesis   I reviewed current medications and the patient has no issues with diabetes medications  Diana Mann is due for eye exam   The patient performs her own feet care and doesn't see podiatry service   Microvascular complications: + Neuropathy. No Retinopathy, No Nephropathy   Macrovascular complications: no CAD, PVD, or Stroke  The patient receives Flushot every year. She has not received the pneumonia vaccine.    PAST MEDICAL HISTORY   Past Medical History:   Diagnosis Date    Bleeding at insertion site 01/05/2018    Common femoral artery injury, right,

## 2024-03-08 DIAGNOSIS — E10.65 TYPE 1 DIABETES MELLITUS WITH HYPERGLYCEMIA (HCC): ICD-10-CM

## 2024-03-11 RX ORDER — INSULIN PMP CART,AUT,G6/7,CNTR
EACH SUBCUTANEOUS
OUTPATIENT
Start: 2024-03-11

## 2024-03-12 ENCOUNTER — OFFICE VISIT (OUTPATIENT)
Dept: PRIMARY CARE CLINIC | Age: 29
End: 2024-03-12
Payer: COMMERCIAL

## 2024-03-12 VITALS
SYSTOLIC BLOOD PRESSURE: 126 MMHG | DIASTOLIC BLOOD PRESSURE: 88 MMHG | HEIGHT: 61 IN | OXYGEN SATURATION: 99 % | TEMPERATURE: 97.3 F | BODY MASS INDEX: 21.9 KG/M2 | HEART RATE: 104 BPM | WEIGHT: 116 LBS

## 2024-03-12 DIAGNOSIS — E10.65 POORLY CONTROLLED TYPE 1 DIABETES MELLITUS (HCC): ICD-10-CM

## 2024-03-12 DIAGNOSIS — F51.01 PRIMARY INSOMNIA: Primary | ICD-10-CM

## 2024-03-12 DIAGNOSIS — E78.6 LOW HDL (UNDER 40): ICD-10-CM

## 2024-03-12 PROCEDURE — 2022F DILAT RTA XM EVC RTNOPTHY: CPT | Performed by: INTERNAL MEDICINE

## 2024-03-12 PROCEDURE — 3079F DIAST BP 80-89 MM HG: CPT | Performed by: INTERNAL MEDICINE

## 2024-03-12 PROCEDURE — G8420 CALC BMI NORM PARAMETERS: HCPCS | Performed by: INTERNAL MEDICINE

## 2024-03-12 PROCEDURE — G8484 FLU IMMUNIZE NO ADMIN: HCPCS | Performed by: INTERNAL MEDICINE

## 2024-03-12 PROCEDURE — 1036F TOBACCO NON-USER: CPT | Performed by: INTERNAL MEDICINE

## 2024-03-12 PROCEDURE — 3074F SYST BP LT 130 MM HG: CPT | Performed by: INTERNAL MEDICINE

## 2024-03-12 PROCEDURE — G8427 DOCREV CUR MEDS BY ELIG CLIN: HCPCS | Performed by: INTERNAL MEDICINE

## 2024-03-12 PROCEDURE — 99214 OFFICE O/P EST MOD 30 MIN: CPT | Performed by: INTERNAL MEDICINE

## 2024-03-12 PROCEDURE — 3046F HEMOGLOBIN A1C LEVEL >9.0%: CPT | Performed by: INTERNAL MEDICINE

## 2024-03-12 RX ORDER — ZOLPIDEM TARTRATE 10 MG/1
10 TABLET ORAL NIGHTLY PRN
Qty: 30 TABLET | Refills: 2 | Status: SHIPPED | OUTPATIENT
Start: 2024-03-12 | End: 2024-06-10

## 2024-03-12 ASSESSMENT — ENCOUNTER SYMPTOMS
SORE THROAT: 0
ABDOMINAL PAIN: 0
COLOR CHANGE: 0
CONSTIPATION: 0
ABDOMINAL DISTENTION: 0
TROUBLE SWALLOWING: 0
ALLERGIC/IMMUNOLOGIC NEGATIVE: 1
SINUS PRESSURE: 0
NAUSEA: 0
BLOOD IN STOOL: 0
BACK PAIN: 0
VOMITING: 0
RHINORRHEA: 0
DIARRHEA: 0

## 2024-03-12 NOTE — PROGRESS NOTES
Diana Mann presents today for follow up of Insomnia and diabetes    Current Outpatient Medications   Medication Sig Dispense Refill    zolpidem (AMBIEN) 10 MG tablet Take 1 tablet by mouth nightly as needed for Sleep for up to 90 days. Max Daily Amount: 10 mg 30 tablet 2    Continuous Blood Gluc Sensor (DEXCOM G6 SENSOR) MISC To change every 10 days 3 each 5    Continuous Blood Gluc Transmit (DEXCOM G6 TRANSMITTER) MISC To change every 90 days 1 each 3    naproxen (NAPROSYN) 500 MG tablet Take 1 tablet by mouth 2 times daily 60 tablet 0    insulin aspart (NOVOLOG) 100 UNIT/ML injection vial 100 units daily via insulin pump 30 mL 5    Insulin Disposable Pump (OMNIPOD 5 G6 INTRO, GEN 5,) KIT To use as directed 1 kit 0    insulin aspart (NOVOLOG FLEXPEN) 100 UNIT/ML injection pen 1:8 + ss 2:50>150 max 50 units a day 15 Adjustable Dose Pre-filled Pen Syringe 5    insulin glargine (LANTUS SOLOSTAR) 100 UNIT/ML injection pen Inject 22 units once a day 9 mL 5    Insulin Pen Needle (BD PEN NEEDLE SHELBIE U/F) 32G X 4 MM MISC Uses with insulin 4 times a day 200 each 5    Insulin Disposable Pump (OMNIPOD 5 G6 POD, GEN 5,) MISC 1 each by Does not apply route every 72 hours 10 each 11    brompheniramine-pseudoephedrine-DM (BROMFED DM) 2-30-10 MG/5ML syrup Take 5 mLs by mouth 3 times daily as needed for Cough (Patient not taking: Reported on 3/12/2024) 250 mL 0     No current facility-administered medications for this visit.      Past Medical History:   Diagnosis Date    Bleeding at insertion site 01/05/2018    Common femoral artery injury, right, initial encounter 01/05/2018    Depressive disorder     Diabetic ketoacidosis (HCC)     DM type 1 (diabetes mellitus, type 1) (HCC)     Marijuana abuse     Non-compliance     Primary hypertension 6/2/2023    Trichimoniasis 11/19/2021          Subjective:  AS above, refers has been doing fine. Finally has the Dexcome, sugars finally coming down! Aprox in the 200. Now taking Ambien 10

## 2024-03-19 DIAGNOSIS — E10.65 TYPE 1 DIABETES MELLITUS WITH HYPERGLYCEMIA (HCC): ICD-10-CM

## 2024-03-20 RX ORDER — INSULIN PMP CART,AUT,G6/7,CNTR
1 EACH SUBCUTANEOUS
Qty: 10 EACH | Refills: 5 | Status: SHIPPED | OUTPATIENT
Start: 2024-03-20

## 2024-04-03 ENCOUNTER — TELEPHONE (OUTPATIENT)
Dept: ENDOCRINOLOGY | Age: 29
End: 2024-04-03

## 2024-04-08 NOTE — TELEPHONE ENCOUNTER
[facility] Transitions Initial Follow Up Call    Outreach made within 2 business days of discharge: Yes    Patient: Hair Morris Patient : 1995   MRN: [id number]  Reason for Admission: DKA, type 1,not at goal  Discharge Date: 22       Spoke with: Un able to speak with patient or leave msg mailbox is full. Discharge department/facility: [facility]    Scheduled appointment with PCP within 7-14 days    Follow Up  No future appointments.     Winston Denise MA  Symptoms

## 2024-06-05 ENCOUNTER — OFFICE VISIT (OUTPATIENT)
Dept: ENDOCRINOLOGY | Age: 29
End: 2024-06-05
Payer: COMMERCIAL

## 2024-06-05 VITALS
BODY MASS INDEX: 21.14 KG/M2 | OXYGEN SATURATION: 98 % | DIASTOLIC BLOOD PRESSURE: 86 MMHG | SYSTOLIC BLOOD PRESSURE: 115 MMHG | HEART RATE: 101 BPM | WEIGHT: 112 LBS | HEIGHT: 61 IN

## 2024-06-05 DIAGNOSIS — E10.65 TYPE 1 DIABETES MELLITUS WITH HYPERGLYCEMIA (HCC): Primary | ICD-10-CM

## 2024-06-05 DIAGNOSIS — E78.5 HYPERLIPIDEMIA, UNSPECIFIED HYPERLIPIDEMIA TYPE: ICD-10-CM

## 2024-06-05 DIAGNOSIS — Z91.119 DIETARY NONCOMPLIANCE: ICD-10-CM

## 2024-06-05 DIAGNOSIS — E10.43 DIABETIC GASTROPARESIS ASSOCIATED WITH TYPE 1 DIABETES MELLITUS (HCC): ICD-10-CM

## 2024-06-05 DIAGNOSIS — E55.9 VITAMIN D DEFICIENCY: ICD-10-CM

## 2024-06-05 DIAGNOSIS — K31.84 DIABETIC GASTROPARESIS ASSOCIATED WITH TYPE 1 DIABETES MELLITUS (HCC): ICD-10-CM

## 2024-06-05 DIAGNOSIS — R80.9 MICROALBUMINURIA: ICD-10-CM

## 2024-06-05 LAB — HBA1C MFR BLD: 10.6 %

## 2024-06-05 PROCEDURE — 95251 CONT GLUC MNTR ANALYSIS I&R: CPT | Performed by: FAMILY MEDICINE

## 2024-06-05 PROCEDURE — 2022F DILAT RTA XM EVC RTNOPTHY: CPT | Performed by: FAMILY MEDICINE

## 2024-06-05 PROCEDURE — G8427 DOCREV CUR MEDS BY ELIG CLIN: HCPCS | Performed by: FAMILY MEDICINE

## 2024-06-05 PROCEDURE — 3046F HEMOGLOBIN A1C LEVEL >9.0%: CPT | Performed by: FAMILY MEDICINE

## 2024-06-05 PROCEDURE — 1036F TOBACCO NON-USER: CPT | Performed by: FAMILY MEDICINE

## 2024-06-05 PROCEDURE — 3079F DIAST BP 80-89 MM HG: CPT | Performed by: FAMILY MEDICINE

## 2024-06-05 PROCEDURE — 3074F SYST BP LT 130 MM HG: CPT | Performed by: FAMILY MEDICINE

## 2024-06-05 PROCEDURE — 83036 HEMOGLOBIN GLYCOSYLATED A1C: CPT | Performed by: FAMILY MEDICINE

## 2024-06-05 PROCEDURE — G8420 CALC BMI NORM PARAMETERS: HCPCS | Performed by: FAMILY MEDICINE

## 2024-06-05 PROCEDURE — 99214 OFFICE O/P EST MOD 30 MIN: CPT | Performed by: FAMILY MEDICINE

## 2024-06-05 RX ORDER — METOPROLOL TARTRATE 50 MG/1
50 TABLET, FILM COATED ORAL 2 TIMES DAILY
COMMUNITY
Start: 2024-05-02

## 2024-06-05 RX ORDER — AMLODIPINE BESYLATE 5 MG/1
5 TABLET ORAL DAILY
COMMUNITY
Start: 2024-05-03

## 2024-06-05 NOTE — PROGRESS NOTES
[facility] Department of Endocrinology Diabetes and Metabolism   [address] [place] [id number]    Phone: [phone]  Fax: [phone]    Date of Service: 6/5/2024  Primary Care Physician: Opal Iyer MD.  Provider: MIKE Foster - CNP      Reason for the visit:  Type 1 DM    History of Present Illness:   The history is provided by the patient. No  was used. Accuracy of the patient data is excellent.  Diana Mann is a very pleasant 28 y.o. female seen today for follow up visit       Diana Mann was diagnosed with diabetes at age 13   Previous use of Medtronic pump  Now on OmniPod 5/Dexcom G6    Current pump settings: Basal 0.7, CR 14, ISF 53, Target 120, correct above 120, AIT 3 hours    TIR 31%  Hyperglycemia 68%  Hypoglycemia 1%  Avg glucose 231    Auto mode 42    TDD 24.7    Not consistently bolusing for meals.  Frequently kicked out of auto mode due to hyperglycemia      Lab Results   Component Value Date/Time    LABA1C 10.6 06/05/2024 03:02 PM    LABA1C 15.0 02/06/2024 12:22 PM    LABA1C 14.2 08/20/2023 12:15 PM    LABA1C 13.7 06/06/2023 03:41 PM     Hypoglycemia improved and now infrequent.  + Awareness  Her diabetes complicated with gastroparesis   I reviewed current medications and the patient has no issues with diabetes medications  Diana Mnan is due for eye exam   The patient performs her own feet care and doesn't see podiatry service   Microvascular complications: + Neuropathy. No Retinopathy, No Nephropathy   Macrovascular complications: no CAD, PVD, or Stroke  The patient receives Flushot every year. She has not received the pneumonia vaccine.    PAST MEDICAL HISTORY   Past Medical History:   Diagnosis Date    Bleeding at insertion site 01/05/2018    Common femoral artery injury, right, initial encounter 01/05/2018    Depressive disorder     Diabetic ketoacidosis (HCC)     DM type 1 (diabetes mellitus, type

## 2024-06-16 ENCOUNTER — HOSPITAL ENCOUNTER (INPATIENT)
Age: 29
LOS: 2 days | Discharge: HOME | DRG: 420 | End: 2024-06-19
Attending: EMERGENCY MEDICINE | Admitting: STUDENT IN AN ORGANIZED HEALTH CARE EDUCATION/TRAINING PROGRAM
Payer: COMMERCIAL

## 2024-06-16 DIAGNOSIS — E10.10 DIABETIC KETOACIDOSIS WITHOUT COMA ASSOCIATED WITH TYPE 1 DIABETES MELLITUS (HCC): Primary | ICD-10-CM

## 2024-06-16 LAB
ALBUMIN SERPL-MCNC: 4.1 G/DL (ref 3.5–5.2)
ALP SERPL-CCNC: 110 U/L (ref 35–104)
ALT SERPL-CCNC: 35 U/L (ref 0–32)
AMPHET UR QL SCN: NEGATIVE
ANION GAP SERPL CALCULATED.3IONS-SCNC: 27 MMOL/L (ref 7–16)
AST SERPL-CCNC: 41 U/L (ref 0–31)
B-OH-BUTYR SERPL-MCNC: 6.49 MMOL/L (ref 0.02–0.27)
BACTERIA URNS QL MICRO: ABNORMAL
BARBITURATES UR QL SCN: NEGATIVE
BASOPHILS # BLD: 0.03 K/UL (ref 0–0.2)
BASOPHILS NFR BLD: 0 % (ref 0–2)
BENZODIAZ UR QL: NEGATIVE
BILIRUB SERPL-MCNC: 0.4 MG/DL (ref 0–1.2)
BILIRUB UR QL STRIP: NEGATIVE
BUN SERPL-MCNC: 28 MG/DL (ref 6–20)
BUPRENORPHINE UR QL: NEGATIVE
CALCIUM SERPL-MCNC: 10 MG/DL (ref 8.6–10.2)
CANNABINOIDS UR QL SCN: POSITIVE
CHLORIDE SERPL-SCNC: 95 MMOL/L (ref 98–107)
CLARITY UR: CLEAR
CO2 SERPL-SCNC: 15 MMOL/L (ref 22–29)
COCAINE UR QL SCN: NEGATIVE
COLOR UR: YELLOW
CREAT SERPL-MCNC: 1 MG/DL (ref 0.5–1)
EOSINOPHIL # BLD: 0 K/UL (ref 0.05–0.5)
EOSINOPHILS RELATIVE PERCENT: 0 % (ref 0–6)
EPI CELLS #/AREA URNS HPF: ABNORMAL /HPF
ERYTHROCYTE [DISTWIDTH] IN BLOOD BY AUTOMATED COUNT: 13 % (ref 11.5–15)
FENTANYL UR QL: NEGATIVE
GFR, ESTIMATED: 85 ML/MIN/1.73M2
GLUCOSE BLD-MCNC: 220 MG/DL (ref 74–99)
GLUCOSE BLD-MCNC: 343 MG/DL (ref 74–99)
GLUCOSE BLD-MCNC: 459 MG/DL (ref 74–99)
GLUCOSE SERPL-MCNC: 550 MG/DL (ref 74–99)
GLUCOSE UR STRIP-MCNC: >=1000 MG/DL
HCG UR QL: NEGATIVE
HCT VFR BLD AUTO: 39.1 % (ref 34–48)
HGB BLD-MCNC: 12.5 G/DL (ref 11.5–15.5)
HGB UR QL STRIP.AUTO: ABNORMAL
IMM GRANULOCYTES # BLD AUTO: 0.09 K/UL (ref 0–0.58)
IMM GRANULOCYTES NFR BLD: 1 % (ref 0–5)
KETONES UR STRIP-MCNC: >80 MG/DL
LEUKOCYTE ESTERASE UR QL STRIP: NEGATIVE
LIPASE SERPL-CCNC: 9 U/L (ref 13–60)
LYMPHOCYTES NFR BLD: 2.43 K/UL (ref 1.5–4)
LYMPHOCYTES RELATIVE PERCENT: 18 % (ref 20–42)
MCH RBC QN AUTO: 27.7 PG (ref 26–35)
MCHC RBC AUTO-ENTMCNC: 32 G/DL (ref 32–34.5)
MCV RBC AUTO: 86.7 FL (ref 80–99.9)
METHADONE UR QL: NEGATIVE
MONOCYTES NFR BLD: 0.61 K/UL (ref 0.1–0.95)
MONOCYTES NFR BLD: 5 % (ref 2–12)
NEUTROPHILS NFR BLD: 77 % (ref 43–80)
NEUTS SEG NFR BLD: 10.26 K/UL (ref 1.8–7.3)
NITRITE UR QL STRIP: NEGATIVE
OPIATES UR QL SCN: NEGATIVE
OXYCODONE UR QL SCN: NEGATIVE
PCP UR QL SCN: NEGATIVE
PH UR STRIP: 6 [PH] (ref 5–9)
PH VENOUS: 7.44 (ref 7.35–7.45)
PLATELET # BLD AUTO: 312 K/UL (ref 130–450)
PMV BLD AUTO: 11.4 FL (ref 7–12)
POTASSIUM SERPL-SCNC: 4.4 MMOL/L (ref 3.5–5)
PROT SERPL-MCNC: 8.1 G/DL (ref 6.4–8.3)
PROT UR STRIP-MCNC: 100 MG/DL
RBC # BLD AUTO: 4.51 M/UL (ref 3.5–5.5)
RBC #/AREA URNS HPF: ABNORMAL /HPF
SODIUM SERPL-SCNC: 137 MMOL/L (ref 132–146)
SP GR UR STRIP: 1.01 (ref 1–1.03)
TEST INFORMATION: ABNORMAL
UROBILINOGEN UR STRIP-ACNC: 0.2 EU/DL (ref 0–1)
WBC #/AREA URNS HPF: ABNORMAL /HPF
WBC OTHER # BLD: 13.4 K/UL (ref 4.5–11.5)

## 2024-06-16 PROCEDURE — 6370000000 HC RX 637 (ALT 250 FOR IP)

## 2024-06-16 PROCEDURE — 82800 BLOOD PH: CPT

## 2024-06-16 PROCEDURE — 83036 HEMOGLOBIN GLYCOSYLATED A1C: CPT

## 2024-06-16 PROCEDURE — 83690 ASSAY OF LIPASE: CPT

## 2024-06-16 PROCEDURE — 96375 TX/PRO/DX INJ NEW DRUG ADDON: CPT

## 2024-06-16 PROCEDURE — 99285 EMERGENCY DEPT VISIT HI MDM: CPT

## 2024-06-16 PROCEDURE — 6360000002 HC RX W HCPCS

## 2024-06-16 PROCEDURE — 96365 THER/PROPH/DIAG IV INF INIT: CPT

## 2024-06-16 PROCEDURE — 82010 KETONE BODYS QUAN: CPT

## 2024-06-16 PROCEDURE — 6360000002 HC RX W HCPCS: Performed by: EMERGENCY MEDICINE

## 2024-06-16 PROCEDURE — 80053 COMPREHEN METABOLIC PANEL: CPT

## 2024-06-16 PROCEDURE — 80307 DRUG TEST PRSMV CHEM ANLYZR: CPT

## 2024-06-16 PROCEDURE — 2580000003 HC RX 258

## 2024-06-16 PROCEDURE — 84703 CHORIONIC GONADOTROPIN ASSAY: CPT

## 2024-06-16 PROCEDURE — 82962 GLUCOSE BLOOD TEST: CPT

## 2024-06-16 PROCEDURE — 85025 COMPLETE CBC W/AUTO DIFF WBC: CPT

## 2024-06-16 PROCEDURE — 81001 URINALYSIS AUTO W/SCOPE: CPT

## 2024-06-16 PROCEDURE — 99291 CRITICAL CARE FIRST HOUR: CPT

## 2024-06-16 RX ORDER — 0.9 % SODIUM CHLORIDE 0.9 %
15 INTRAVENOUS SOLUTION INTRAVENOUS ONCE
Status: COMPLETED | OUTPATIENT
Start: 2024-06-16 | End: 2024-06-16

## 2024-06-16 RX ORDER — SODIUM CHLORIDE 0.9 % (FLUSH) 0.9 %
5-40 SYRINGE (ML) INJECTION EVERY 12 HOURS SCHEDULED
Status: DISCONTINUED | OUTPATIENT
Start: 2024-06-16 | End: 2024-06-19 | Stop reason: HOSPADM

## 2024-06-16 RX ORDER — POLYETHYLENE GLYCOL 3350 17 G/17G
17 POWDER, FOR SOLUTION ORAL DAILY PRN
Status: DISCONTINUED | OUTPATIENT
Start: 2024-06-16 | End: 2024-06-19 | Stop reason: HOSPADM

## 2024-06-16 RX ORDER — SODIUM CHLORIDE 9 MG/ML
INJECTION, SOLUTION INTRAVENOUS ONCE
Status: DISCONTINUED | OUTPATIENT
Start: 2024-06-16 | End: 2024-06-19 | Stop reason: HOSPADM

## 2024-06-16 RX ORDER — MAGNESIUM SULFATE IN WATER 40 MG/ML
2000 INJECTION, SOLUTION INTRAVENOUS PRN
Status: DISCONTINUED | OUTPATIENT
Start: 2024-06-16 | End: 2024-06-17

## 2024-06-16 RX ORDER — DEXTROSE MONOHYDRATE AND SODIUM CHLORIDE 5; .45 G/100ML; G/100ML
INJECTION, SOLUTION INTRAVENOUS CONTINUOUS PRN
Status: DISCONTINUED | OUTPATIENT
Start: 2024-06-16 | End: 2024-06-19 | Stop reason: HOSPADM

## 2024-06-16 RX ORDER — ACETAMINOPHEN 650 MG/1
650 SUPPOSITORY RECTAL EVERY 6 HOURS PRN
Status: DISCONTINUED | OUTPATIENT
Start: 2024-06-16 | End: 2024-06-19 | Stop reason: HOSPADM

## 2024-06-16 RX ORDER — SODIUM CHLORIDE 450 MG/100ML
INJECTION, SOLUTION INTRAVENOUS CONTINUOUS
Status: DISCONTINUED | OUTPATIENT
Start: 2024-06-16 | End: 2024-06-19 | Stop reason: HOSPADM

## 2024-06-16 RX ORDER — 0.9 % SODIUM CHLORIDE 0.9 %
1000 INTRAVENOUS SOLUTION INTRAVENOUS ONCE
Status: COMPLETED | OUTPATIENT
Start: 2024-06-16 | End: 2024-06-16

## 2024-06-16 RX ORDER — SODIUM CHLORIDE 0.9 % (FLUSH) 0.9 %
5-40 SYRINGE (ML) INJECTION PRN
Status: DISCONTINUED | OUTPATIENT
Start: 2024-06-16 | End: 2024-06-19 | Stop reason: HOSPADM

## 2024-06-16 RX ORDER — AMLODIPINE BESYLATE 5 MG/1
5 TABLET ORAL DAILY
Status: DISCONTINUED | OUTPATIENT
Start: 2024-06-17 | End: 2024-06-19 | Stop reason: HOSPADM

## 2024-06-16 RX ORDER — SODIUM CHLORIDE 9 MG/ML
INJECTION, SOLUTION INTRAVENOUS PRN
Status: DISCONTINUED | OUTPATIENT
Start: 2024-06-16 | End: 2024-06-17

## 2024-06-16 RX ORDER — MORPHINE SULFATE 4 MG/ML
4 INJECTION, SOLUTION INTRAMUSCULAR; INTRAVENOUS ONCE
Status: COMPLETED | OUTPATIENT
Start: 2024-06-16 | End: 2024-06-16

## 2024-06-16 RX ORDER — PROCHLORPERAZINE EDISYLATE 5 MG/ML
10 INJECTION INTRAMUSCULAR; INTRAVENOUS ONCE
Status: COMPLETED | OUTPATIENT
Start: 2024-06-16 | End: 2024-06-16

## 2024-06-16 RX ORDER — ONDANSETRON 2 MG/ML
4 INJECTION INTRAMUSCULAR; INTRAVENOUS EVERY 6 HOURS PRN
Status: DISCONTINUED | OUTPATIENT
Start: 2024-06-16 | End: 2024-06-19 | Stop reason: HOSPADM

## 2024-06-16 RX ORDER — POTASSIUM CHLORIDE 7.45 MG/ML
10 INJECTION INTRAVENOUS PRN
Status: DISCONTINUED | OUTPATIENT
Start: 2024-06-16 | End: 2024-06-19 | Stop reason: HOSPADM

## 2024-06-16 RX ORDER — METOPROLOL TARTRATE 50 MG/1
50 TABLET, FILM COATED ORAL 2 TIMES DAILY
Status: DISCONTINUED | OUTPATIENT
Start: 2024-06-16 | End: 2024-06-19 | Stop reason: HOSPADM

## 2024-06-16 RX ORDER — ENOXAPARIN SODIUM 100 MG/ML
40 INJECTION SUBCUTANEOUS DAILY
Status: DISCONTINUED | OUTPATIENT
Start: 2024-06-17 | End: 2024-06-19 | Stop reason: HOSPADM

## 2024-06-16 RX ORDER — ONDANSETRON 4 MG/1
4 TABLET, ORALLY DISINTEGRATING ORAL EVERY 8 HOURS PRN
Status: DISCONTINUED | OUTPATIENT
Start: 2024-06-16 | End: 2024-06-19 | Stop reason: HOSPADM

## 2024-06-16 RX ORDER — ACETAMINOPHEN 325 MG/1
650 TABLET ORAL EVERY 6 HOURS PRN
Status: DISCONTINUED | OUTPATIENT
Start: 2024-06-16 | End: 2024-06-19 | Stop reason: HOSPADM

## 2024-06-16 RX ADMIN — SODIUM CHLORIDE 783 ML: 9 INJECTION, SOLUTION INTRAVENOUS at 22:25

## 2024-06-16 RX ADMIN — POTASSIUM CHLORIDE 10 MEQ: 7.46 INJECTION, SOLUTION INTRAVENOUS at 23:46

## 2024-06-16 RX ADMIN — MORPHINE SULFATE 4 MG: 4 INJECTION, SOLUTION INTRAMUSCULAR; INTRAVENOUS at 22:25

## 2024-06-16 RX ADMIN — PROCHLORPERAZINE EDISYLATE 10 MG: 5 INJECTION INTRAMUSCULAR; INTRAVENOUS at 22:27

## 2024-06-16 RX ADMIN — SODIUM CHLORIDE 5.7 UNITS/HR: 9 INJECTION, SOLUTION INTRAVENOUS at 22:20

## 2024-06-16 RX ADMIN — INSULIN HUMAN 8 UNITS: 100 INJECTION, SOLUTION PARENTERAL at 22:20

## 2024-06-16 RX ADMIN — SODIUM CHLORIDE 1000 ML: 9 INJECTION, SOLUTION INTRAVENOUS at 20:34

## 2024-06-16 ASSESSMENT — PAIN SCALES - GENERAL
PAINLEVEL_OUTOF10: 10
PAINLEVEL_OUTOF10: 8

## 2024-06-16 ASSESSMENT — PAIN DESCRIPTION - LOCATION: LOCATION: BACK

## 2024-06-16 ASSESSMENT — PAIN DESCRIPTION - ORIENTATION: ORIENTATION: RIGHT;LEFT

## 2024-06-16 ASSESSMENT — PAIN DESCRIPTION - DESCRIPTORS: DESCRIPTORS: SHARP

## 2024-06-16 ASSESSMENT — PAIN - FUNCTIONAL ASSESSMENT: PAIN_FUNCTIONAL_ASSESSMENT: 0-10

## 2024-06-17 DIAGNOSIS — E10.65 TYPE 1 DIABETES MELLITUS WITH HYPERGLYCEMIA (HCC): ICD-10-CM

## 2024-06-17 PROBLEM — E10.10 DKA, TYPE 1, NOT AT GOAL (HCC): Status: ACTIVE | Noted: 2024-06-17

## 2024-06-17 LAB
ANION GAP SERPL CALCULATED.3IONS-SCNC: 10 MMOL/L (ref 7–16)
ANION GAP SERPL CALCULATED.3IONS-SCNC: 11 MMOL/L (ref 7–16)
BASOPHILS # BLD: 0.04 K/UL (ref 0–0.2)
BASOPHILS NFR BLD: 0 % (ref 0–2)
BUN SERPL-MCNC: 20 MG/DL (ref 6–20)
BUN SERPL-MCNC: 22 MG/DL (ref 6–20)
CALCIUM SERPL-MCNC: 8.2 MG/DL (ref 8.6–10.2)
CALCIUM SERPL-MCNC: 8.4 MG/DL (ref 8.6–10.2)
CHLORIDE SERPL-SCNC: 106 MMOL/L (ref 98–107)
CHLORIDE SERPL-SCNC: 107 MMOL/L (ref 98–107)
CO2 SERPL-SCNC: 22 MMOL/L (ref 22–29)
CO2 SERPL-SCNC: 22 MMOL/L (ref 22–29)
CREAT SERPL-MCNC: 0.7 MG/DL (ref 0.5–1)
CREAT SERPL-MCNC: 0.8 MG/DL (ref 0.5–1)
EOSINOPHIL # BLD: 0.01 K/UL (ref 0.05–0.5)
EOSINOPHILS RELATIVE PERCENT: 0 % (ref 0–6)
ERYTHROCYTE [DISTWIDTH] IN BLOOD BY AUTOMATED COUNT: 13.2 % (ref 11.5–15)
GFR, ESTIMATED: >90 ML/MIN/1.73M2
GFR, ESTIMATED: >90 ML/MIN/1.73M2
GLUCOSE BLD-MCNC: 119 MG/DL (ref 74–99)
GLUCOSE BLD-MCNC: 132 MG/DL (ref 74–99)
GLUCOSE BLD-MCNC: 183 MG/DL (ref 74–99)
GLUCOSE BLD-MCNC: 184 MG/DL (ref 74–99)
GLUCOSE BLD-MCNC: 191 MG/DL (ref 74–99)
GLUCOSE BLD-MCNC: 194 MG/DL (ref 74–99)
GLUCOSE BLD-MCNC: 204 MG/DL (ref 74–99)
GLUCOSE BLD-MCNC: 228 MG/DL (ref 74–99)
GLUCOSE BLD-MCNC: 237 MG/DL (ref 74–99)
GLUCOSE BLD-MCNC: 303 MG/DL (ref 74–99)
GLUCOSE SERPL-MCNC: 199 MG/DL (ref 74–99)
GLUCOSE SERPL-MCNC: 226 MG/DL (ref 74–99)
HBA1C MFR BLD: 11.7 % (ref 4–5.6)
HCT VFR BLD AUTO: 28.3 % (ref 34–48)
HGB BLD-MCNC: 9.3 G/DL (ref 11.5–15.5)
IMM GRANULOCYTES # BLD AUTO: 0.06 K/UL (ref 0–0.58)
IMM GRANULOCYTES NFR BLD: 0 % (ref 0–5)
LYMPHOCYTES NFR BLD: 3.85 K/UL (ref 1.5–4)
LYMPHOCYTES RELATIVE PERCENT: 29 % (ref 20–42)
MAGNESIUM SERPL-MCNC: 1.8 MG/DL (ref 1.6–2.6)
MAGNESIUM SERPL-MCNC: 1.8 MG/DL (ref 1.6–2.6)
MCH RBC QN AUTO: 27.4 PG (ref 26–35)
MCHC RBC AUTO-ENTMCNC: 32.9 G/DL (ref 32–34.5)
MCV RBC AUTO: 83.2 FL (ref 80–99.9)
MONOCYTES NFR BLD: 1.14 K/UL (ref 0.1–0.95)
MONOCYTES NFR BLD: 9 % (ref 2–12)
NEUTROPHILS NFR BLD: 62 % (ref 43–80)
NEUTS SEG NFR BLD: 8.39 K/UL (ref 1.8–7.3)
PHOSPHATE SERPL-MCNC: 3.1 MG/DL (ref 2.5–4.5)
PHOSPHATE SERPL-MCNC: 3.4 MG/DL (ref 2.5–4.5)
PLATELET # BLD AUTO: 272 K/UL (ref 130–450)
PMV BLD AUTO: 11.1 FL (ref 7–12)
POTASSIUM SERPL-SCNC: 3.8 MMOL/L (ref 3.5–5)
POTASSIUM SERPL-SCNC: 4.5 MMOL/L (ref 3.5–5)
RBC # BLD AUTO: 3.4 M/UL (ref 3.5–5.5)
SODIUM SERPL-SCNC: 138 MMOL/L (ref 132–146)
SODIUM SERPL-SCNC: 140 MMOL/L (ref 132–146)
WBC OTHER # BLD: 13.5 K/UL (ref 4.5–11.5)

## 2024-06-17 PROCEDURE — 2580000003 HC RX 258

## 2024-06-17 PROCEDURE — 6360000002 HC RX W HCPCS

## 2024-06-17 PROCEDURE — 6370000000 HC RX 637 (ALT 250 FOR IP)

## 2024-06-17 PROCEDURE — 84100 ASSAY OF PHOSPHORUS: CPT

## 2024-06-17 PROCEDURE — 80048 BASIC METABOLIC PNL TOTAL CA: CPT

## 2024-06-17 PROCEDURE — 1200000000 HC SEMI PRIVATE

## 2024-06-17 PROCEDURE — 83735 ASSAY OF MAGNESIUM: CPT

## 2024-06-17 PROCEDURE — 87081 CULTURE SCREEN ONLY: CPT

## 2024-06-17 PROCEDURE — 96361 HYDRATE IV INFUSION ADD-ON: CPT

## 2024-06-17 PROCEDURE — 96368 THER/DIAG CONCURRENT INF: CPT

## 2024-06-17 PROCEDURE — 82962 GLUCOSE BLOOD TEST: CPT

## 2024-06-17 PROCEDURE — 99291 CRITICAL CARE FIRST HOUR: CPT | Performed by: STUDENT IN AN ORGANIZED HEALTH CARE EDUCATION/TRAINING PROGRAM

## 2024-06-17 PROCEDURE — 85025 COMPLETE CBC W/AUTO DIFF WBC: CPT

## 2024-06-17 RX ORDER — INSULIN LISPRO 100 [IU]/ML
0-4 INJECTION, SOLUTION INTRAVENOUS; SUBCUTANEOUS NIGHTLY
Status: DISCONTINUED | OUTPATIENT
Start: 2024-06-17 | End: 2024-06-19 | Stop reason: HOSPADM

## 2024-06-17 RX ORDER — INSULIN PMP CART,AUT,G6/7,CNTR
1 EACH SUBCUTANEOUS
Qty: 15 EACH | Refills: 11 | Status: SHIPPED | OUTPATIENT
Start: 2024-06-17

## 2024-06-17 RX ORDER — INSULIN LISPRO 100 [IU]/ML
3 INJECTION, SOLUTION INTRAVENOUS; SUBCUTANEOUS
Status: DISCONTINUED | OUTPATIENT
Start: 2024-06-17 | End: 2024-06-19 | Stop reason: HOSPADM

## 2024-06-17 RX ORDER — INSULIN LISPRO 100 [IU]/ML
0-4 INJECTION, SOLUTION INTRAVENOUS; SUBCUTANEOUS
Status: DISCONTINUED | OUTPATIENT
Start: 2024-06-17 | End: 2024-06-19 | Stop reason: HOSPADM

## 2024-06-17 RX ORDER — INSULIN GLARGINE 100 [IU]/ML
12 INJECTION, SOLUTION SUBCUTANEOUS DAILY
Status: DISCONTINUED | OUTPATIENT
Start: 2024-06-17 | End: 2024-06-19 | Stop reason: HOSPADM

## 2024-06-17 RX ADMIN — INSULIN LISPRO 4 UNITS: 100 INJECTION, SOLUTION INTRAVENOUS; SUBCUTANEOUS at 21:22

## 2024-06-17 RX ADMIN — METOPROLOL TARTRATE 50 MG: 50 TABLET, FILM COATED ORAL at 23:47

## 2024-06-17 RX ADMIN — POTASSIUM CHLORIDE 10 MEQ: 7.46 INJECTION, SOLUTION INTRAVENOUS at 04:53

## 2024-06-17 RX ADMIN — INSULIN LISPRO 1 UNITS: 100 INJECTION, SOLUTION INTRAVENOUS; SUBCUTANEOUS at 11:32

## 2024-06-17 RX ADMIN — INSULIN LISPRO 3 UNITS: 100 INJECTION, SOLUTION INTRAVENOUS; SUBCUTANEOUS at 16:17

## 2024-06-17 RX ADMIN — SODIUM CHLORIDE, PRESERVATIVE FREE 10 ML: 5 INJECTION INTRAVENOUS at 08:11

## 2024-06-17 RX ADMIN — POTASSIUM CHLORIDE 10 MEQ: 7.46 INJECTION, SOLUTION INTRAVENOUS at 03:03

## 2024-06-17 RX ADMIN — POTASSIUM CHLORIDE 10 MEQ: 7.46 INJECTION, SOLUTION INTRAVENOUS at 03:46

## 2024-06-17 RX ADMIN — INSULIN LISPRO 1 UNITS: 100 INJECTION, SOLUTION INTRAVENOUS; SUBCUTANEOUS at 16:17

## 2024-06-17 RX ADMIN — SODIUM CHLORIDE, PRESERVATIVE FREE 10 ML: 5 INJECTION INTRAVENOUS at 21:23

## 2024-06-17 RX ADMIN — DEXTROSE AND SODIUM CHLORIDE: 5; 450 INJECTION, SOLUTION INTRAVENOUS at 00:05

## 2024-06-17 RX ADMIN — INSULIN LISPRO 3 UNITS: 100 INJECTION, SOLUTION INTRAVENOUS; SUBCUTANEOUS at 11:32

## 2024-06-17 RX ADMIN — INSULIN GLARGINE 12 UNITS: 100 INJECTION, SOLUTION SUBCUTANEOUS at 08:08

## 2024-06-17 RX ADMIN — AMLODIPINE BESYLATE 5 MG: 5 TABLET ORAL at 15:08

## 2024-06-17 RX ADMIN — METOPROLOL TARTRATE 50 MG: 50 TABLET, FILM COATED ORAL at 15:09

## 2024-06-17 ASSESSMENT — PAIN SCALES - GENERAL: PAINLEVEL_OUTOF10: 0

## 2024-06-17 NOTE — CONSULTS
Critical Care Admit/Consult Note         Patient - Diana Mann   MRN -  [id number]   [id number]   - 1995      Date of Admission -  2024  7:46 PM  Date of evaluation -  2024   Hospital Day - 0            ADMIT/CONSULT DETAILS     Reason for Admit/Consult   DKA    Consulting Service/Physician   Consulting - Harshil Nguyen DO  Primary Care Physician - Opal Iyer MD         [facility]   The patient is a 28 y.o. female with significant past medical history of type I DKA, marijuana abuse, medical noncompliance, HTN.  with significant past medical history of.      Patient presented to the emergency department for evaluation of nausea vomiting.  Patient is known type I diabetic with medical noncompliance and multiple admissions for DKA in the past.  Patient uses insulin pump and states she ran out of her OmniPod yesterday.      ED course: Initial vital signs temp 98.6, RR 18, , /101, SpO2 99% on room air.  Initial labs , anion gap 27, CO2 15, beta hydroxybutyrate 6.49 and positive urine ketones.  In ED patient was given 1.8 L of normal saline, 8 unit IV bolus and initiated on insulin infusion.  She was also given 10 mg of Compazine and 4 mg of morphine.      Past Medical History         Diagnosis Date    Bleeding at insertion site 2018    Common femoral artery injury, right, initial encounter 2018    Depressive disorder     Diabetic ketoacidosis (HCC)     DM type 1 (diabetes mellitus, type 1) (HCC)     Marijuana abuse     Non-compliance     Primary hypertension 2023    Trichimoniasis 2021        Past Surgical History           Procedure Laterality Date    ABDOMEN SURGERY N/A 2019    INCISION AND DRAINAGE OF SUPRAPUBIC ABSESS performed by Keaton JUAREZ MD at [facility] OR    BARTHOLIN GLAND CYST EXCISION      last yr         Current Medications   Current Medications    sodium chloride  1,000 mL IntraVENous Once    sodium chloride  15

## 2024-06-17 NOTE — PROGRESS NOTES
4 Eyes Skin Assessment     NAME:  Diana Mann  YOB: 1995  MEDICAL RECORD NUMBER:  [id number]    The patient is being assessed for  Admission    I agree that at least one RN has performed a thorough Head to Toe Skin Assessment on the patient. ALL assessment sites listed below have been assessed.      Areas assessed by both nurses:    Head, Face, Ears, Shoulders, Back, Chest, Arms, Elbows, Hands, Sacrum. Buttock, Coccyx, Ischium, and Legs. Feet and Heels        Does the Patient have a Wound? No noted wound(s)       Gavino Prevention initiated by RN: No  Wound Care Orders initiated by RN: No    Pressure Injury (Stage 3,4, Unstageable, DTI, NWPT, and Complex wounds) if present, place Wound referral order by RN under : No    New Ostomies, if present place, Ostomy referral order under : No     Nurse 1 eSignature: Electronically signed by Elza Harman RN on 6/17/24 at 11:54 AM EDT    **SHARE this note so that the co-signing nurse can place an eSignature**    Nurse 2 eSignature: Electronically signed by Hayley Corral RN on 6/17/24 at 1:05 PM EDT

## 2024-06-17 NOTE — PLAN OF CARE
Problem: Discharge Planning  Goal: Discharge to home or other facility with appropriate resources  Outcome: Progressing     Problem: Pain  Goal: Verbalizes/displays adequate comfort level or baseline comfort level  Outcome: Progressing  Flowsheets (Taken 6/17/2024 0800 by Christian Bartlett RN)  Verbalizes/displays adequate comfort level or baseline comfort level: Assess pain using appropriate pain scale     Problem: ABCDS Injury Assessment  Goal: Absence of physical injury  Outcome: Progressing

## 2024-06-17 NOTE — H&P
[facility] Hospitalist [facility] History and Physical      CHIEF COMPLAINT: Hyperglycemia and vomiting    History of Present Illness: 28-year-old lady with past medical history significant for type 1 diabetes mellitus uncontrolled, DKA, urine abuse, noncompliance with medical therapy, primary hypertension presents to ED with vomiting for 1 day.  He is known type I diabetic with insulin pump.  Patient stated that her insulin pump ran out last night.  Glucometer was reading high when she checked her blood sugars at home.  Denies any chest pain, shortness of breath, no lightheadedness or dizziness, no focal deficits, no recent illness or sick contact.  Workup in ED she was found to have DKA with blood sugars of 150 and anion gap of 27, with beta-hydroxybutyrate of 6.49.  Treatment was started and she was admitted to ICU.    Informant(s) for H&P:    REVIEW OF SYSTEMS:  A comprehensive review of systems was negative except for: what is in the HPI      PMH:  Past Medical History:   Diagnosis Date    Bleeding at insertion site 01/05/2018    Common femoral artery injury, right, initial encounter 01/05/2018    Depressive disorder     Diabetic ketoacidosis (HCC)     DM type 1 (diabetes mellitus, type 1) (HCC)     Marijuana abuse     Non-compliance     Primary hypertension 6/2/2023    Trichimoniasis 11/19/2021       Surgical History:  Past Surgical History:   Procedure Laterality Date    ABDOMEN SURGERY N/A 5/9/2019    INCISION AND DRAINAGE OF SUPRAPUBIC ABSESS performed by Keaton JUAREZ MD at [facility] OR    BARTHOLIN GLAND CYST EXCISION      last yr       Medications Prior to Admission:    Prior to Admission medications    Medication Sig Start Date End Date Taking? Authorizing Provider   amLODIPine (NORVASC) 5 MG tablet Take 1 tablet by mouth daily 5/3/24   Hali Vanessa MD   metoprolol tartrate (LOPRESSOR) 50 MG tablet Take 1 tablet by mouth 2 times daily 5/2/24   Hali Vanessa MD   Insulin Disposable Pump

## 2024-06-17 NOTE — PROGRESS NOTES
.Patient admitted from ER to 204, with the following belongings; strawberry crocks, black jacket, large brown purse with contents, glasses, cell phone with , , placed on monitor, patient oriented to room and unit visiting hours.  Patient guide at bedside, reviewed patient rights and responsibilities. MRSA nasal swab obtained.  Bed alarm on.  Call light within reach.

## 2024-06-17 NOTE — CARE COORDINATION
Met with patient about diagnosis and discharge plan of care. Pt admit for DKA. Blood glucose monitor, ISS. Pt has omnipod and dexcom cgm. Follows with Dr Peter. PCP is Dr Saucedo. Lives with grandmother, independent prior to admit. Independent prior to admit. Declining needs for home. Will follow-tomasa

## 2024-06-17 NOTE — PROGRESS NOTES
.4 Eyes Skin Assessment     NAME:  Diana Mann  YOB: 1995  MEDICAL RECORD NUMBER:  [id number]    The patient is being assessed for  Admission    I agree that at least one RN has performed a thorough Head to Toe Skin Assessment on the patient. ALL assessment sites listed below have been assessed.      Areas assessed by both nurses:    Head, Face, Ears, Shoulders, Back, Chest, Arms, Elbows, Hands, Sacrum. Buttock, Coccyx, Ischium, Legs. Feet and Heels, and Under Medical Devices         Does the Patient have a Wound? No noted wound(s)       Gavino Prevention initiated by RN: No  Wound Care Orders initiated by RN: No    Pressure Injury (Stage 3,4, Unstageable, DTI, NWPT, and Complex wounds) if present, place Wound referral order by RN under : No    New Ostomies, if present place, Ostomy referral order under : No     Nurse 1 eSignature: Electronically signed by Itzel Shine RN on 6/17/24 at 1:58 AM EDT    **SHARE this note so that the co-signing nurse can place an eSignature**    Nurse 2 eSignature: {Esignature:[id number]}

## 2024-06-17 NOTE — ED PROVIDER NOTES
[facility] 2W ICU  EMERGENCY DEPARTMENT ENCOUNTER        Pt Name: Diana Mann  MRN: [id number]  Birthdate 1995  Date of evaluation: 6/16/2024  Provider: Raf Ritchie II, DO  PCP: Opal Iyer MD  Note Started: 8:08 PM EDT 6/16/24    CHIEF COMPLAINT       Chief Complaint   Patient presents with    Hyperglycemia     Ran out of insulin last night, emesis, unable to keep anything down, back pain    Back Pain    Emesis       HISTORY OF PRESENT ILLNESS: 1 or more Elements            Diana Mann is a 28 y.o. female history of diabetes, who presents for complaint of nausea and vomiting since this morning.  Patient states that her insulin pump ran out last night, was able to obtain vials of insulin, last took 2 hours prior to ER arrival.  Patient states that she follows with Dr. De Guzman for endocrinology.  Patient states her glucometer has been reading high since last night.  Patient denies any recent illnesses such as cough, congestion, diarrhea, dysuria.  Patient's only complaint is bilateral lower back pain, nonradiating, not associated with movement, denies any hematuria or dysuria.    Nursing Notes were all reviewed and agreed with or any disagreements were addressed in the HPI.      REVIEW OF EXTERNAL NOTE :       Records reviewed for medical hx, surgical, hx, and medication lists      Chart Review/External Note Review    Last Echo reviewed by Me:  No results found for: \"LVEF\", \"LVEFMODE\"          Controlled Substance Monitoring:    Acute and Chronic Pain Monitoring:   RX Monitoring Periodic Controlled Substance Monitoring   8/7/2020   3:05 PM Possible medication side effects, risk of tolerance/dependence & alternative treatments discussed.;No signs of potential drug abuse or diversion identified.;Assessed functional status.;Obtaining appropriate analgesic effect of treatment.           REVIEW OF SYSTEMS :      Positives and Pertinent negatives as per HPI.     SURGICAL HISTORY     Past Surgical

## 2024-06-17 NOTE — PATIENT CARE CONFERENCE
.  Intensive Care Daily Quality Rounding Checklist      ICU Team Members:     ICU Day #: NUMBER: 1    Intubation Date: n/a     Ventilator Day #:     Central Line Insertion Date:          Day #:         Indication:      Arterial Line Insertion Date:        Day #:     Temporary Hemodialysis Catheter Insertion Date:        Day #     DVT Prophylaxis:    GI Prophylaxis:    Bey Catheter Insertion Date: n/a         Day #:       Indications:       Continued need (if yes, reason documented and discussed with physician): no    Skin Issues/ Wounds and ordered treatment discussed on rounds: n    Goals/ Plans for the Day: Activity Progression  .    Reviewed plan and goals for day with patient and/or representative: Yes

## 2024-06-18 DIAGNOSIS — E78.5 HYPERLIPIDEMIA, UNSPECIFIED HYPERLIPIDEMIA TYPE: ICD-10-CM

## 2024-06-18 DIAGNOSIS — E10.65 TYPE 1 DIABETES MELLITUS WITH HYPERGLYCEMIA (HCC): Primary | ICD-10-CM

## 2024-06-18 LAB
ANION GAP SERPL CALCULATED.3IONS-SCNC: 11 MMOL/L (ref 7–16)
BASOPHILS # BLD: 0.06 K/UL (ref 0–0.2)
BASOPHILS NFR BLD: 1 % (ref 0–2)
BUN SERPL-MCNC: 15 MG/DL (ref 6–20)
CALCIUM SERPL-MCNC: 8.8 MG/DL (ref 8.6–10.2)
CHLORIDE SERPL-SCNC: 99 MMOL/L (ref 98–107)
CO2 SERPL-SCNC: 23 MMOL/L (ref 22–29)
CREAT SERPL-MCNC: 0.7 MG/DL (ref 0.5–1)
EOSINOPHIL # BLD: 0.06 K/UL (ref 0.05–0.5)
EOSINOPHILS RELATIVE PERCENT: 1 % (ref 0–6)
ERYTHROCYTE [DISTWIDTH] IN BLOOD BY AUTOMATED COUNT: 13.2 % (ref 11.5–15)
GFR, ESTIMATED: >90 ML/MIN/1.73M2
GLUCOSE BLD-MCNC: 210 MG/DL (ref 74–99)
GLUCOSE BLD-MCNC: 254 MG/DL (ref 74–99)
GLUCOSE BLD-MCNC: 303 MG/DL (ref 74–99)
GLUCOSE BLD-MCNC: 391 MG/DL (ref 74–99)
GLUCOSE SERPL-MCNC: 375 MG/DL (ref 74–99)
HCT VFR BLD AUTO: 32.2 % (ref 34–48)
HGB BLD-MCNC: 10.5 G/DL (ref 11.5–15.5)
IMM GRANULOCYTES # BLD AUTO: 0.03 K/UL (ref 0–0.58)
IMM GRANULOCYTES NFR BLD: 0 % (ref 0–5)
LYMPHOCYTES NFR BLD: 3.11 K/UL (ref 1.5–4)
LYMPHOCYTES RELATIVE PERCENT: 33 % (ref 20–42)
MAGNESIUM SERPL-MCNC: 1.6 MG/DL (ref 1.6–2.6)
MCH RBC QN AUTO: 27.4 PG (ref 26–35)
MCHC RBC AUTO-ENTMCNC: 32.6 G/DL (ref 32–34.5)
MCV RBC AUTO: 84.1 FL (ref 80–99.9)
MICROORGANISM SPEC CULT: NORMAL
MONOCYTES NFR BLD: 0.41 K/UL (ref 0.1–0.95)
MONOCYTES NFR BLD: 4 % (ref 2–12)
NEUTROPHILS NFR BLD: 62 % (ref 43–80)
NEUTS SEG NFR BLD: 5.87 K/UL (ref 1.8–7.3)
PHOSPHATE SERPL-MCNC: 2.9 MG/DL (ref 2.5–4.5)
PLATELET # BLD AUTO: 275 K/UL (ref 130–450)
PMV BLD AUTO: 11.3 FL (ref 7–12)
POTASSIUM SERPL-SCNC: 4 MMOL/L (ref 3.5–5)
RBC # BLD AUTO: 3.83 M/UL (ref 3.5–5.5)
SODIUM SERPL-SCNC: 133 MMOL/L (ref 132–146)
SPECIMEN DESCRIPTION: NORMAL
WBC OTHER # BLD: 9.5 K/UL (ref 4.5–11.5)

## 2024-06-18 PROCEDURE — 6360000002 HC RX W HCPCS

## 2024-06-18 PROCEDURE — 83735 ASSAY OF MAGNESIUM: CPT

## 2024-06-18 PROCEDURE — 99232 SBSQ HOSP IP/OBS MODERATE 35: CPT | Performed by: INTERNAL MEDICINE

## 2024-06-18 PROCEDURE — 6370000000 HC RX 637 (ALT 250 FOR IP): Performed by: INTERNAL MEDICINE

## 2024-06-18 PROCEDURE — 2580000003 HC RX 258

## 2024-06-18 PROCEDURE — 6370000000 HC RX 637 (ALT 250 FOR IP): Performed by: NURSE PRACTITIONER

## 2024-06-18 PROCEDURE — 82962 GLUCOSE BLOOD TEST: CPT

## 2024-06-18 PROCEDURE — 6370000000 HC RX 637 (ALT 250 FOR IP)

## 2024-06-18 PROCEDURE — 84100 ASSAY OF PHOSPHORUS: CPT

## 2024-06-18 PROCEDURE — 85025 COMPLETE CBC W/AUTO DIFF WBC: CPT

## 2024-06-18 PROCEDURE — 36415 COLL VENOUS BLD VENIPUNCTURE: CPT

## 2024-06-18 PROCEDURE — 80048 BASIC METABOLIC PNL TOTAL CA: CPT

## 2024-06-18 PROCEDURE — 1200000000 HC SEMI PRIVATE

## 2024-06-18 RX ORDER — GLUCAGON 1 MG/ML
1 KIT INJECTION PRN
Status: DISCONTINUED | OUTPATIENT
Start: 2024-06-18 | End: 2024-06-19 | Stop reason: HOSPADM

## 2024-06-18 RX ORDER — INSULIN LISPRO 100 [IU]/ML
4 INJECTION, SOLUTION INTRAVENOUS; SUBCUTANEOUS ONCE
Status: COMPLETED | OUTPATIENT
Start: 2024-06-18 | End: 2024-06-18

## 2024-06-18 RX ORDER — DEXTROSE MONOHYDRATE 100 MG/ML
INJECTION, SOLUTION INTRAVENOUS CONTINUOUS PRN
Status: DISCONTINUED | OUTPATIENT
Start: 2024-06-18 | End: 2024-06-19 | Stop reason: HOSPADM

## 2024-06-18 RX ADMIN — SODIUM CHLORIDE, PRESERVATIVE FREE 10 ML: 5 INJECTION INTRAVENOUS at 07:38

## 2024-06-18 RX ADMIN — INSULIN LISPRO 4 UNITS: 100 INJECTION, SOLUTION INTRAVENOUS; SUBCUTANEOUS at 21:12

## 2024-06-18 RX ADMIN — INSULIN HUMAN 8 UNITS: 100 INJECTION, SUSPENSION SUBCUTANEOUS at 09:05

## 2024-06-18 RX ADMIN — INSULIN LISPRO 2 UNITS: 100 INJECTION, SOLUTION INTRAVENOUS; SUBCUTANEOUS at 16:13

## 2024-06-18 RX ADMIN — SODIUM CHLORIDE, PRESERVATIVE FREE 10 ML: 5 INJECTION INTRAVENOUS at 21:10

## 2024-06-18 RX ADMIN — INSULIN LISPRO 4 UNITS: 100 INJECTION, SOLUTION INTRAVENOUS; SUBCUTANEOUS at 06:17

## 2024-06-18 RX ADMIN — INSULIN LISPRO 1 UNITS: 100 INJECTION, SOLUTION INTRAVENOUS; SUBCUTANEOUS at 11:41

## 2024-06-18 RX ADMIN — METOPROLOL TARTRATE 50 MG: 50 TABLET, FILM COATED ORAL at 07:37

## 2024-06-18 RX ADMIN — INSULIN LISPRO 3 UNITS: 100 INJECTION, SOLUTION INTRAVENOUS; SUBCUTANEOUS at 11:41

## 2024-06-18 RX ADMIN — INSULIN LISPRO 3 UNITS: 100 INJECTION, SOLUTION INTRAVENOUS; SUBCUTANEOUS at 16:13

## 2024-06-18 RX ADMIN — AMLODIPINE BESYLATE 5 MG: 5 TABLET ORAL at 07:37

## 2024-06-18 RX ADMIN — INSULIN LISPRO 4 UNITS: 100 INJECTION, SOLUTION INTRAVENOUS; SUBCUTANEOUS at 06:18

## 2024-06-18 RX ADMIN — ENOXAPARIN SODIUM 40 MG: 40 INJECTION SUBCUTANEOUS at 07:37

## 2024-06-18 RX ADMIN — INSULIN GLARGINE 12 UNITS: 100 INJECTION, SOLUTION SUBCUTANEOUS at 06:17

## 2024-06-18 RX ADMIN — METOPROLOL TARTRATE 50 MG: 50 TABLET, FILM COATED ORAL at 21:10

## 2024-06-18 NOTE — PLAN OF CARE
Problem: Discharge Planning  Goal: Discharge to home or other facility with appropriate resources  6/17/2024 2206 by June Cotto, RN  Outcome: Progressing  6/17/2024 1158 by Elza Harman RN  Outcome: Progressing     Problem: Pain  Goal: Verbalizes/displays adequate comfort level or baseline comfort level  6/17/2024 2206 by June Cotto, RN  Outcome: Progressing  6/17/2024 1158 by Elza Harman RN  Outcome: Progressing  Flowsheets (Taken 6/17/2024 0800 by Christian Bartlett, RN)  Verbalizes/displays adequate comfort level or baseline comfort level: Assess pain using appropriate pain scale     Problem: ABCDS Injury Assessment  Goal: Absence of physical injury  6/17/2024 2206 by June Cotto, RN  Outcome: Progressing  6/17/2024 1158 by Elza Harman, RN  Outcome: Progressing

## 2024-06-18 NOTE — PLAN OF CARE
Problem: Discharge Planning  Goal: Discharge to home or other facility with appropriate resources  6/18/2024 0811 by Elza Harman RN  Outcome: Progressing  6/17/2024 2206 by June Cotto, RN  Outcome: Progressing     Problem: Pain  Goal: Verbalizes/displays adequate comfort level or baseline comfort level  6/18/2024 0811 by Elza Harman RN  Outcome: Progressing  6/17/2024 2206 by June Cotto, RN  Outcome: Progressing     Problem: ABCDS Injury Assessment  Goal: Absence of physical injury  6/18/2024 0811 by Elza Harman, RN  Outcome: Progressing  6/17/2024 2206 by June Cotto, RN  Outcome: Progressing

## 2024-06-18 NOTE — CARE COORDINATION
Updated plan of care. Continue to monitor glucose levels. Pt has supplies. Plan is home. Declining needs-tomasa

## 2024-06-18 NOTE — PROGRESS NOTES
Progress  Note  Chief Complaint   Patient presents with    Hyperglycemia     Ran out of insulin last night, emesis, unable to keep anything down, back pain    Back Pain    Emesis     Historical Issues:  Current Facility-Administered Medications   Medication Dose Route Frequency Provider Last Rate Last Admin    dextrose bolus 10% 125 mL  125 mL IntraVENous PRDhiraj Calle MD        Or    dextrose bolus 10% 250 mL  250 mL IntraVENous PRDhiraj Calle MD        glucagon injection 1 mg  1 mg SubCUTAneous PRN Dhiraj Nieves MD        dextrose 10 % infusion   IntraVENous Continuous PRN Dhiraj Nieves MD        Glucose (TRUEPLUS) oral gel 15 g  15 g Oral PRN Dhiraj Nieves MD        insulin glargine (LANTUS) injection vial 12 Units  12 Units SubCUTAneous Daily Orlando Thomas APRN - CNP   12 Units at 06/18/24 0617    insulin lispro (HUMALOG,ADMELOG) injection vial 0-4 Units  0-4 Units SubCUTAneous TID  Orlando Thomas APRN - CNP   1 Units at 06/18/24 1141    insulin lispro (HUMALOG,ADMELOG) injection vial 0-4 Units  0-4 Units SubCUTAneous Nightly Orlando Thomas APRN - CNP   4 Units at 06/17/24 2122    insulin lispro (HUMALOG,ADMELOG) injection vial 3 Units  3 Units SubCUTAneous TID  Orlando Thomas APRN - CNP   3 Units at 06/18/24 1141    potassium chloride 10 mEq/100 mL IVPB (Peripheral Line)  10 mEq IntraVENous PRN Raf Ritchie II, DO   Stopped at 06/17/24 0558    0.45 % sodium chloride infusion   IntraVENous Continuous Raf Ritchie II, DO        dextrose 5 % and 0.45 % sodium chloride infusion   IntraVENous Continuous PRN Raf Ritchie II, DO   Stopped at 06/17/24 0855    0.9 % sodium chloride infusion   IntraVENous Once Harshil Nguyen DO        amLODIPine (NORVASC) tablet 5 mg  5 mg Oral Daily Orlando Thomas APRN - CNP   5 mg at 06/18/24 0737    metoprolol tartrate (LOPRESSOR) tablet 50 mg  50 mg Oral BID Orlando Thomas APRN - CNP   50 mg at 06/18/24 0737    sodium chloride

## 2024-06-18 NOTE — PROGRESS NOTES
P Quality Flow/Interdisciplinary Rounds Progress Note        Quality Flow Rounds held on June 18, 2024    Disciplines Attending:  Bedside Nurse, , , and Nursing Unit Leadership    Diana Mann was admitted on 6/16/2024  7:46 PM    Anticipated Discharge Date:       Disposition:    Gavino Score:  Gavino Scale Score: 22    Readmission Risk              Risk of Unplanned Readmission:  10           Discussed patient goal for the day, patient clinical progression, and barriers to discharge.  The following Goal(s) of the Day/Commitment(s) have been identified:   discharge planning      Jefferson Ring RN  June 18, 2024

## 2024-06-19 VITALS
DIASTOLIC BLOOD PRESSURE: 98 MMHG | HEIGHT: 61 IN | HEART RATE: 87 BPM | BODY MASS INDEX: 22.2 KG/M2 | RESPIRATION RATE: 16 BRPM | TEMPERATURE: 98.2 F | SYSTOLIC BLOOD PRESSURE: 133 MMHG | OXYGEN SATURATION: 99 % | WEIGHT: 117.6 LBS

## 2024-06-19 LAB
ANION GAP SERPL CALCULATED.3IONS-SCNC: 13 MMOL/L (ref 7–16)
BASOPHILS # BLD: 0.03 K/UL (ref 0–0.2)
BASOPHILS NFR BLD: 0 % (ref 0–2)
BUN SERPL-MCNC: 19 MG/DL (ref 6–20)
CALCIUM SERPL-MCNC: 8.9 MG/DL (ref 8.6–10.2)
CHLORIDE SERPL-SCNC: 100 MMOL/L (ref 98–107)
CO2 SERPL-SCNC: 21 MMOL/L (ref 22–29)
CREAT SERPL-MCNC: 0.7 MG/DL (ref 0.5–1)
EOSINOPHIL # BLD: 0.08 K/UL (ref 0.05–0.5)
EOSINOPHILS RELATIVE PERCENT: 1 % (ref 0–6)
ERYTHROCYTE [DISTWIDTH] IN BLOOD BY AUTOMATED COUNT: 12.9 % (ref 11.5–15)
GFR, ESTIMATED: >90 ML/MIN/1.73M2
GLUCOSE BLD-MCNC: 162 MG/DL (ref 74–99)
GLUCOSE BLD-MCNC: 228 MG/DL (ref 74–99)
GLUCOSE SERPL-MCNC: 273 MG/DL (ref 74–99)
HCT VFR BLD AUTO: 35.2 % (ref 34–48)
HGB BLD-MCNC: 11.4 G/DL (ref 11.5–15.5)
IMM GRANULOCYTES # BLD AUTO: <0.03 K/UL (ref 0–0.58)
IMM GRANULOCYTES NFR BLD: 0 % (ref 0–5)
LYMPHOCYTES NFR BLD: 3.31 K/UL (ref 1.5–4)
LYMPHOCYTES RELATIVE PERCENT: 41 % (ref 20–42)
MAGNESIUM SERPL-MCNC: 1.9 MG/DL (ref 1.6–2.6)
MCH RBC QN AUTO: 26.8 PG (ref 26–35)
MCHC RBC AUTO-ENTMCNC: 32.4 G/DL (ref 32–34.5)
MCV RBC AUTO: 82.8 FL (ref 80–99.9)
MONOCYTES NFR BLD: 0.43 K/UL (ref 0.1–0.95)
MONOCYTES NFR BLD: 5 % (ref 2–12)
NEUTROPHILS NFR BLD: 52 % (ref 43–80)
NEUTS SEG NFR BLD: 4.25 K/UL (ref 1.8–7.3)
PHOSPHATE SERPL-MCNC: 4.1 MG/DL (ref 2.5–4.5)
PMV BLD AUTO: 11.2 FL (ref 7–12)
POTASSIUM SERPL-SCNC: 4.1 MMOL/L (ref 3.5–5)
RBC # BLD AUTO: 4.25 M/UL (ref 3.5–5.5)
SODIUM SERPL-SCNC: 134 MMOL/L (ref 132–146)
WBC OTHER # BLD: 8.1 K/UL (ref 4.5–11.5)

## 2024-06-19 PROCEDURE — 82962 GLUCOSE BLOOD TEST: CPT

## 2024-06-19 PROCEDURE — 6370000000 HC RX 637 (ALT 250 FOR IP)

## 2024-06-19 PROCEDURE — 83735 ASSAY OF MAGNESIUM: CPT

## 2024-06-19 PROCEDURE — 80048 BASIC METABOLIC PNL TOTAL CA: CPT

## 2024-06-19 PROCEDURE — 36415 COLL VENOUS BLD VENIPUNCTURE: CPT

## 2024-06-19 PROCEDURE — 99233 SBSQ HOSP IP/OBS HIGH 50: CPT | Performed by: INTERNAL MEDICINE

## 2024-06-19 PROCEDURE — 84100 ASSAY OF PHOSPHORUS: CPT

## 2024-06-19 PROCEDURE — 85025 COMPLETE CBC W/AUTO DIFF WBC: CPT

## 2024-06-19 RX ORDER — INSULIN GLARGINE 100 [IU]/ML
17 INJECTION, SOLUTION SUBCUTANEOUS DAILY
Qty: 10 ML | Refills: 3 | Status: SHIPPED | OUTPATIENT
Start: 2024-06-20

## 2024-06-19 RX ORDER — GLUCAGON 1 MG/ML
1 KIT INJECTION PRN
Qty: 1 EACH | Refills: 1 | Status: SHIPPED | OUTPATIENT
Start: 2024-06-19

## 2024-06-19 RX ORDER — INSULIN LISPRO 100 [IU]/ML
5 INJECTION, SOLUTION INTRAVENOUS; SUBCUTANEOUS
Qty: 1 EACH | Refills: 0 | Status: SHIPPED | OUTPATIENT
Start: 2024-06-19

## 2024-06-19 RX ADMIN — METOPROLOL TARTRATE 50 MG: 50 TABLET, FILM COATED ORAL at 07:34

## 2024-06-19 RX ADMIN — AMLODIPINE BESYLATE 5 MG: 5 TABLET ORAL at 07:34

## 2024-06-19 RX ADMIN — INSULIN GLARGINE 12 UNITS: 100 INJECTION, SOLUTION SUBCUTANEOUS at 07:33

## 2024-06-19 RX ADMIN — INSULIN LISPRO 2 UNITS: 100 INJECTION, SOLUTION INTRAVENOUS; SUBCUTANEOUS at 07:31

## 2024-06-19 RX ADMIN — INSULIN LISPRO 3 UNITS: 100 INJECTION, SOLUTION INTRAVENOUS; SUBCUTANEOUS at 07:31

## 2024-06-19 RX ADMIN — INSULIN LISPRO 3 UNITS: 100 INJECTION, SOLUTION INTRAVENOUS; SUBCUTANEOUS at 11:39

## 2024-06-19 NOTE — PLAN OF CARE
Problem: Discharge Planning  Goal: Discharge to home or other facility with appropriate resources  6/18/2024 2126 by June Cotto, RN  Outcome: Progressing  6/18/2024 0811 by Elza Harman RN  Outcome: Progressing     Problem: Pain  Goal: Verbalizes/displays adequate comfort level or baseline comfort level  6/18/2024 2126 by June Cotto RN  Outcome: Progressing  6/18/2024 0811 by Elza Harman RN  Outcome: Progressing     Problem: ABCDS Injury Assessment  Goal: Absence of physical injury  6/18/2024 2126 by June Cotto, RN  Outcome: Progressing  6/18/2024 0811 by Elza Harman RN  Outcome: Progressing     Problem: Chronic Conditions and Co-morbidities  Goal: Patient's chronic conditions and co-morbidity symptoms are monitored and maintained or improved  Outcome: Progressing

## 2024-06-19 NOTE — PLAN OF CARE
Problem: Discharge Planning  Goal: Discharge to home or other facility with appropriate resources  6/19/2024 1044 by Gene Givens RN  Outcome: Adequate for Discharge  6/18/2024 2126 by June Cotto RN  Outcome: Progressing     Problem: Pain  Goal: Verbalizes/displays adequate comfort level or baseline comfort level  6/19/2024 1044 by Gene Givens RN  Outcome: Adequate for Discharge  6/18/2024 2126 by June Cotto, RN  Outcome: Progressing     Problem: ABCDS Injury Assessment  Goal: Absence of physical injury  6/19/2024 1044 by Gene Givens RN  Outcome: Adequate for Discharge  6/18/2024 2126 by June Cotto, RN  Outcome: Progressing     Problem: Chronic Conditions and Co-morbidities  Goal: Patient's chronic conditions and co-morbidity symptoms are monitored and maintained or improved  6/19/2024 1044 by Gene Givens RN  Outcome: Adequate for Discharge  6/18/2024 2126 by June Cotto, RN  Outcome: Progressing

## 2024-06-19 NOTE — DISCHARGE SUMMARY
UNIT/ML injection pen  Comments:  Reason for Stopping:    insulin glargine (LANTUS SOLOSTAR) 100 UNIT/ML injection pen  Comments:  Reason for Stopping:          Time Spent on Discharge:  29 minutes were spent in patient examination, evaluation, counseling as well as medication reconciliation, prescriptions for required medications, discharge plan, and follow up.    Electronically signed by Dhiraj Nieves MD on 6/19/24 at 2:27 PM EDT

## 2024-06-19 NOTE — CARE COORDINATION
Updated plan of care. Discharge home today. Pt actually has insulin pump-not omnipod. She reached out to endocrinology doctor to obtain supplies needs. Declining needs-tomasa

## 2024-06-21 ENCOUNTER — HOSPITAL ENCOUNTER (OUTPATIENT)
Dept: DIABETES SERVICES | Age: 29
Setting detail: THERAPIES SERIES
Discharge: HOME | End: 2024-06-21
Attending: INTERNAL MEDICINE

## 2024-06-21 NOTE — PROGRESS NOTES
CLINICAL PHARMACY NOTE: MEDS TO BEDS    Total # of Prescriptions Filled: 4   The following medications were delivered to the patient:  Pen needles  Lantus  True glucox gel  humalog    Additional Documentation:

## 2024-07-05 ENCOUNTER — HOSPITAL ENCOUNTER (OUTPATIENT)
Dept: DIABETES SERVICES | Age: 29
Setting detail: THERAPIES SERIES
Discharge: HOME | End: 2024-07-05

## 2024-07-05 NOTE — PROGRESS NOTES
Patient scheduled for diabetes meal planning today and did not show. Patient was called 7/3/24 to confirm appointment.

## 2024-07-16 ENCOUNTER — OFFICE VISIT (OUTPATIENT)
Dept: PRIMARY CARE CLINIC | Age: 29
End: 2024-07-16
Payer: COMMERCIAL

## 2024-07-16 VITALS
OXYGEN SATURATION: 100 % | TEMPERATURE: 98 F | SYSTOLIC BLOOD PRESSURE: 92 MMHG | WEIGHT: 113.2 LBS | HEART RATE: 99 BPM | HEIGHT: 61 IN | BODY MASS INDEX: 21.37 KG/M2 | DIASTOLIC BLOOD PRESSURE: 68 MMHG

## 2024-07-16 DIAGNOSIS — F51.01 PRIMARY INSOMNIA: Primary | ICD-10-CM

## 2024-07-16 DIAGNOSIS — E10.65 TYPE 1 DIABETES MELLITUS WITH HYPERGLYCEMIA (HCC): ICD-10-CM

## 2024-07-16 PROCEDURE — 3046F HEMOGLOBIN A1C LEVEL >9.0%: CPT | Performed by: INTERNAL MEDICINE

## 2024-07-16 PROCEDURE — 1036F TOBACCO NON-USER: CPT | Performed by: INTERNAL MEDICINE

## 2024-07-16 PROCEDURE — G8420 CALC BMI NORM PARAMETERS: HCPCS | Performed by: INTERNAL MEDICINE

## 2024-07-16 PROCEDURE — 3078F DIAST BP <80 MM HG: CPT | Performed by: INTERNAL MEDICINE

## 2024-07-16 PROCEDURE — 2022F DILAT RTA XM EVC RTNOPTHY: CPT | Performed by: INTERNAL MEDICINE

## 2024-07-16 PROCEDURE — 1111F DSCHRG MED/CURRENT MED MERGE: CPT | Performed by: INTERNAL MEDICINE

## 2024-07-16 PROCEDURE — 3074F SYST BP LT 130 MM HG: CPT | Performed by: INTERNAL MEDICINE

## 2024-07-16 PROCEDURE — G8427 DOCREV CUR MEDS BY ELIG CLIN: HCPCS | Performed by: INTERNAL MEDICINE

## 2024-07-16 PROCEDURE — 99214 OFFICE O/P EST MOD 30 MIN: CPT | Performed by: INTERNAL MEDICINE

## 2024-07-16 RX ORDER — PROCHLORPERAZINE 25 MG/1
SUPPOSITORY RECTAL
Qty: 3 EACH | Refills: 5 | Status: SHIPPED | OUTPATIENT
Start: 2024-07-16

## 2024-07-16 RX ORDER — ZOLPIDEM TARTRATE 10 MG/1
10 TABLET ORAL NIGHTLY
COMMUNITY
End: 2024-07-16 | Stop reason: SDUPTHER

## 2024-07-16 RX ORDER — INSULIN PMP CART,AUT,G6/7,CNTR
1 EACH SUBCUTANEOUS
Qty: 15 EACH | Refills: 11 | Status: SHIPPED | OUTPATIENT
Start: 2024-07-16

## 2024-07-16 RX ORDER — ZOLPIDEM TARTRATE 10 MG/1
10 TABLET ORAL NIGHTLY
Qty: 30 TABLET | Refills: 2 | Status: SHIPPED | OUTPATIENT
Start: 2024-07-16 | End: 2024-10-14

## 2024-07-16 ASSESSMENT — ENCOUNTER SYMPTOMS
VOMITING: 0
NAUSEA: 0
COLOR CHANGE: 0
BLOOD IN STOOL: 0
ABDOMINAL PAIN: 0
CONSTIPATION: 0
DIARRHEA: 0
SORE THROAT: 0
SINUS PRESSURE: 0
TROUBLE SWALLOWING: 0
RHINORRHEA: 0
ABDOMINAL DISTENTION: 0
ALLERGIC/IMMUNOLOGIC NEGATIVE: 1
BACK PAIN: 0

## 2024-07-16 NOTE — PROGRESS NOTES
Non-compliance     Primary hypertension 6/2/2023    Trichimoniasis 11/19/2021          Subjective:  Refers was admitted in June due to D[place]  Has been fine since discharge, trying to follow a Diabetic diet. Issues with her pump supplies, just covering her bs with insulin. Has follow up appt with endo in Sept.Needs refill on Ambien, taking one every night with good results.      Medication Refill  Pertinent negatives include no abdominal pain, arthralgias, chest pain, chills, congestion, headaches, nausea, neck pain, sore throat, vomiting or weakness.      Review of Systems   Constitutional:  Negative for activity change, appetite change and chills.   HENT:  Negative for congestion, ear pain, mouth sores, postnasal drip, rhinorrhea, sinus pressure, sneezing, sore throat and trouble swallowing.    Eyes:  Negative for visual disturbance.   Cardiovascular:  Negative for chest pain, palpitations and leg swelling.   Gastrointestinal:  Negative for abdominal distention, abdominal pain, blood in stool, constipation, diarrhea, nausea and vomiting.   Endocrine: Negative for cold intolerance, heat intolerance, polydipsia and polyuria.   Genitourinary:  Negative for difficulty urinating, dysuria, flank pain, frequency and urgency.   Musculoskeletal:  Negative for arthralgias, back pain, gait problem, neck pain and neck stiffness.   Skin: Negative.  Negative for color change.   Allergic/Immunologic: Negative.    Neurological:  Negative for dizziness, tremors, speech difficulty, weakness, light-headedness and headaches.   Hematological: Negative.    Psychiatric/Behavioral: Negative.          Insomnia          Objective:  BP 92/68 (Site: Left Upper Arm)   Pulse 99   Temp 98 °F (36.7 °C)   Ht 1.549 m (5' 1\")   Wt 51.3 kg (113 lb 3.2 oz)   LMP 05/26/2024 (Approximate)   SpO2 100%   BMI 21.39 kg/m²      Physical Exam  Vitals reviewed.   Constitutional:       Comments: Low wt   HENT:

## 2024-07-19 ENCOUNTER — HOSPITAL ENCOUNTER (OUTPATIENT)
Dept: DIABETES SERVICES | Age: 29
Setting detail: THERAPIES SERIES
Discharge: HOME | End: 2024-07-19

## 2024-07-23 ENCOUNTER — HOSPITAL ENCOUNTER (EMERGENCY)
Age: 29
Discharge: HOME | End: 2024-07-23
Payer: COMMERCIAL

## 2024-07-23 VITALS
WEIGHT: 118 LBS | HEART RATE: 82 BPM | DIASTOLIC BLOOD PRESSURE: 90 MMHG | BODY MASS INDEX: 22.28 KG/M2 | RESPIRATION RATE: 16 BRPM | SYSTOLIC BLOOD PRESSURE: 128 MMHG | OXYGEN SATURATION: 100 % | HEIGHT: 61 IN | TEMPERATURE: 97.9 F

## 2024-07-23 DIAGNOSIS — R42 DIZZINESS: ICD-10-CM

## 2024-07-23 DIAGNOSIS — A59.9 TRICHOMONIASIS: ICD-10-CM

## 2024-07-23 DIAGNOSIS — N30.01 ACUTE CYSTITIS WITH HEMATURIA: ICD-10-CM

## 2024-07-23 DIAGNOSIS — E10.65 HYPERGLYCEMIA DUE TO TYPE 1 DIABETES MELLITUS (HCC): Primary | ICD-10-CM

## 2024-07-23 LAB
ALBUMIN SERPL-MCNC: 3.5 G/DL (ref 3.5–5.2)
ALBUMIN SERPL-MCNC: 4.4 G/DL (ref 3.5–5.2)
ALP SERPL-CCNC: 108 U/L (ref 35–104)
ALP SERPL-CCNC: 81 U/L (ref 35–104)
ALT SERPL-CCNC: 20 U/L (ref 0–32)
ALT SERPL-CCNC: 24 U/L (ref 0–32)
ANION GAP SERPL CALCULATED.3IONS-SCNC: 12 MMOL/L (ref 7–16)
ANION GAP SERPL CALCULATED.3IONS-SCNC: 9 MMOL/L (ref 7–16)
AST SERPL-CCNC: 19 U/L (ref 0–31)
AST SERPL-CCNC: 28 U/L (ref 0–31)
B-OH-BUTYR SERPL-MCNC: 1.37 MMOL/L (ref 0.02–0.27)
BACTERIA URNS QL MICRO: ABNORMAL
BASOPHILS # BLD: 0.05 K/UL (ref 0–0.2)
BASOPHILS NFR BLD: 1 % (ref 0–2)
BILIRUB SERPL-MCNC: 0.2 MG/DL (ref 0–1.2)
BILIRUB SERPL-MCNC: 0.5 MG/DL (ref 0–1.2)
BILIRUB UR QL STRIP: ABNORMAL
BUN SERPL-MCNC: 20 MG/DL (ref 6–20)
BUN SERPL-MCNC: 24 MG/DL (ref 6–20)
CALCIUM SERPL-MCNC: 8 MG/DL (ref 8.6–10.2)
CALCIUM SERPL-MCNC: 9.4 MG/DL (ref 8.6–10.2)
CHLORIDE SERPL-SCNC: 100 MMOL/L (ref 98–107)
CHLORIDE SERPL-SCNC: 91 MMOL/L (ref 98–107)
CLARITY UR: ABNORMAL
CO2 SERPL-SCNC: 22 MMOL/L (ref 22–29)
CO2 SERPL-SCNC: 24 MMOL/L (ref 22–29)
COLOR UR: YELLOW
CREAT SERPL-MCNC: 1 MG/DL (ref 0.5–1)
CREAT SERPL-MCNC: 1.3 MG/DL (ref 0.5–1)
EKG ATRIAL RATE: 106 BPM
EKG P AXIS: 68 DEGREES
EKG P-R INTERVAL: 150 MS
EKG Q-T INTERVAL: 350 MS
EKG QRS DURATION: 64 MS
EKG QTC CALCULATION (BAZETT): 464 MS
EKG R AXIS: 63 DEGREES
EKG T AXIS: 68 DEGREES
EKG VENTRICULAR RATE: 106 BPM
EOSINOPHIL # BLD: 0.05 K/UL (ref 0.05–0.5)
EOSINOPHILS RELATIVE PERCENT: 1 % (ref 0–6)
EPI CELLS #/AREA URNS HPF: ABNORMAL /HPF
ERYTHROCYTE [DISTWIDTH] IN BLOOD BY AUTOMATED COUNT: 12.4 % (ref 11.5–15)
GFR, ESTIMATED: 59 ML/MIN/1.73M2
GFR, ESTIMATED: 77 ML/MIN/1.73M2
GLUCOSE BLD-MCNC: 269 MG/DL (ref 74–99)
GLUCOSE BLD-MCNC: 418 MG/DL (ref 74–99)
GLUCOSE SERPL-MCNC: 415 MG/DL (ref 74–99)
GLUCOSE SERPL-MCNC: 477 MG/DL (ref 74–99)
GLUCOSE UR STRIP-MCNC: >=1000 MG/DL
HCG, URINE, POC: NEGATIVE
HCT VFR BLD AUTO: 41.6 % (ref 34–48)
HGB BLD-MCNC: 14.1 G/DL (ref 11.5–15.5)
HGB UR QL STRIP.AUTO: ABNORMAL
IMM GRANULOCYTES # BLD AUTO: <0.03 K/UL (ref 0–0.58)
IMM GRANULOCYTES NFR BLD: 0 % (ref 0–5)
KETONES UR STRIP-MCNC: ABNORMAL MG/DL
LACTATE BLDV-SCNC: 2 MMOL/L (ref 0.5–2.2)
LEUKOCYTE ESTERASE UR QL STRIP: ABNORMAL
LIPASE SERPL-CCNC: 20 U/L (ref 13–60)
LYMPHOCYTES NFR BLD: 2.65 K/UL (ref 1.5–4)
LYMPHOCYTES RELATIVE PERCENT: 26 % (ref 20–42)
Lab: NORMAL
MCH RBC QN AUTO: 27.2 PG (ref 26–35)
MCHC RBC AUTO-ENTMCNC: 33.9 G/DL (ref 32–34.5)
MCV RBC AUTO: 80.3 FL (ref 80–99.9)
MONOCYTES NFR BLD: 0.44 K/UL (ref 0.1–0.95)
MONOCYTES NFR BLD: 4 % (ref 2–12)
NEGATIVE QC PASS/FAIL: NORMAL
NEUTROPHILS NFR BLD: 68 % (ref 43–80)
NEUTS SEG NFR BLD: 6.88 K/UL (ref 1.8–7.3)
NITRITE UR QL STRIP: NEGATIVE
PH UR STRIP: 5 [PH] (ref 5–9)
PH VENOUS: 7.37 (ref 7.35–7.45)
PLATELET # BLD AUTO: 319 K/UL (ref 130–450)
PMV BLD AUTO: 11.7 FL (ref 7–12)
POSITIVE QC PASS/FAIL: NORMAL
POTASSIUM SERPL-SCNC: 4.9 MMOL/L (ref 3.5–5)
POTASSIUM SERPL-SCNC: 4.9 MMOL/L (ref 3.5–5)
PROT SERPL-MCNC: 6.5 G/DL (ref 6.4–8.3)
PROT SERPL-MCNC: 8.4 G/DL (ref 6.4–8.3)
PROT UR STRIP-MCNC: 100 MG/DL
RBC # BLD AUTO: 5.18 M/UL (ref 3.5–5.5)
RBC #/AREA URNS HPF: ABNORMAL /HPF
SODIUM SERPL-SCNC: 127 MMOL/L (ref 132–146)
SODIUM SERPL-SCNC: 131 MMOL/L (ref 132–146)
SP GR UR STRIP: 1.02 (ref 1–1.03)
TRICHOMONAS #/AREA URNS HPF: PRESENT /[HPF]
TROPONIN I SERPL HS-MCNC: 11 NG/L (ref 0–9)
TROPONIN I SERPL HS-MCNC: 17 NG/L (ref 0–9)
UROBILINOGEN UR STRIP-ACNC: 0.2 EU/DL (ref 0–1)
WBC #/AREA URNS HPF: ABNORMAL /HPF
WBC OTHER # BLD: 10.1 K/UL (ref 4.5–11.5)

## 2024-07-23 PROCEDURE — 82962 GLUCOSE BLOOD TEST: CPT

## 2024-07-23 PROCEDURE — 96375 TX/PRO/DX INJ NEW DRUG ADDON: CPT

## 2024-07-23 PROCEDURE — 6370000000 HC RX 637 (ALT 250 FOR IP): Performed by: PHYSICIAN ASSISTANT

## 2024-07-23 PROCEDURE — 81001 URINALYSIS AUTO W/SCOPE: CPT

## 2024-07-23 PROCEDURE — 80053 COMPREHEN METABOLIC PANEL: CPT

## 2024-07-23 PROCEDURE — 93010 ELECTROCARDIOGRAM REPORT: CPT | Performed by: INTERNAL MEDICINE

## 2024-07-23 PROCEDURE — 84484 ASSAY OF TROPONIN QUANT: CPT

## 2024-07-23 PROCEDURE — 85025 COMPLETE CBC W/AUTO DIFF WBC: CPT

## 2024-07-23 PROCEDURE — 83605 ASSAY OF LACTIC ACID: CPT

## 2024-07-23 PROCEDURE — 99284 EMERGENCY DEPT VISIT MOD MDM: CPT

## 2024-07-23 PROCEDURE — 6360000002 HC RX W HCPCS: Performed by: PHYSICIAN ASSISTANT

## 2024-07-23 PROCEDURE — 82010 KETONE BODYS QUAN: CPT

## 2024-07-23 PROCEDURE — 96372 THER/PROPH/DIAG INJ SC/IM: CPT

## 2024-07-23 PROCEDURE — 2580000003 HC RX 258: Performed by: PHYSICIAN ASSISTANT

## 2024-07-23 PROCEDURE — 83690 ASSAY OF LIPASE: CPT

## 2024-07-23 PROCEDURE — 87591 N.GONORRHOEAE DNA AMP PROB: CPT

## 2024-07-23 PROCEDURE — 87491 CHLMYD TRACH DNA AMP PROBE: CPT

## 2024-07-23 PROCEDURE — 93005 ELECTROCARDIOGRAM TRACING: CPT | Performed by: PHYSICIAN ASSISTANT

## 2024-07-23 PROCEDURE — 96361 HYDRATE IV INFUSION ADD-ON: CPT

## 2024-07-23 PROCEDURE — 96374 THER/PROPH/DIAG INJ IV PUSH: CPT

## 2024-07-23 PROCEDURE — 82800 BLOOD PH: CPT

## 2024-07-23 RX ORDER — CEFTRIAXONE 500 MG/1
500 INJECTION, POWDER, FOR SOLUTION INTRAMUSCULAR; INTRAVENOUS ONCE
Status: COMPLETED | OUTPATIENT
Start: 2024-07-23 | End: 2024-07-23

## 2024-07-23 RX ORDER — DOXYCYCLINE HYCLATE 100 MG
100 TABLET ORAL 2 TIMES DAILY
Qty: 20 TABLET | Refills: 0 | Status: SHIPPED | OUTPATIENT
Start: 2024-07-23 | End: 2024-08-02

## 2024-07-23 RX ORDER — METRONIDAZOLE 500 MG/1
500 TABLET ORAL 2 TIMES DAILY
Qty: 14 TABLET | Refills: 0 | Status: SHIPPED | OUTPATIENT
Start: 2024-07-23 | End: 2024-07-30

## 2024-07-23 RX ORDER — FLUCONAZOLE 150 MG/1
150 TABLET ORAL ONCE
Qty: 1 TABLET | Refills: 0 | Status: SHIPPED | OUTPATIENT
Start: 2024-07-23 | End: 2024-07-23

## 2024-07-23 RX ORDER — 0.9 % SODIUM CHLORIDE 0.9 %
1000 INTRAVENOUS SOLUTION INTRAVENOUS ONCE
Status: COMPLETED | OUTPATIENT
Start: 2024-07-23 | End: 2024-07-23

## 2024-07-23 RX ORDER — ONDANSETRON 2 MG/ML
4 INJECTION INTRAMUSCULAR; INTRAVENOUS ONCE
Status: COMPLETED | OUTPATIENT
Start: 2024-07-23 | End: 2024-07-23

## 2024-07-23 RX ORDER — GRANULES FOR ORAL 3 G/1
3 POWDER ORAL ONCE
Status: COMPLETED | OUTPATIENT
Start: 2024-07-23 | End: 2024-07-23

## 2024-07-23 RX ADMIN — CEFTRIAXONE SODIUM 500 MG: 500 INJECTION, POWDER, FOR SOLUTION INTRAMUSCULAR; INTRAVENOUS at 13:52

## 2024-07-23 RX ADMIN — INSULIN HUMAN 5 UNITS: 100 INJECTION, SOLUTION PARENTERAL at 13:40

## 2024-07-23 RX ADMIN — GRANULES FOR ORAL SOLUTION 1 PACKET: 3 POWDER ORAL at 15:14

## 2024-07-23 RX ADMIN — SODIUM CHLORIDE 1000 ML: 9 INJECTION, SOLUTION INTRAVENOUS at 10:50

## 2024-07-23 RX ADMIN — SODIUM CHLORIDE 1000 ML: 9 INJECTION, SOLUTION INTRAVENOUS at 10:01

## 2024-07-23 RX ADMIN — ONDANSETRON 4 MG: 2 INJECTION INTRAMUSCULAR; INTRAVENOUS at 10:01

## 2024-07-23 NOTE — DISCHARGE INSTRUCTIONS
Please be conscientious of what you are eating due to your diabetes.  You were treated for all STDs for 10 days complete the antibiotics and then take the Diflucan afterwards which will help you with a yeast infection.  Please drink plenty of fluids!!

## 2024-07-23 NOTE — ED PROVIDER NOTES
pass out. Patient is a Type 1 Diabetic and states she is compliant with her medications and her sugars at home run around 230. She states this has happened to her before and she usually feel better when she takes her insulin and and lays down. Patient states in the past, she felt this way and ended up in DKA. She denies confusion, diaphoresis, fever, nasal congestion, syncope, loss of conscious, headache, chest pain, palpitations, diplopia, loss of vision, focal weakness, focal sensory loss, vomiting, neck pain, changes in gait, changes in appetite, URI symptoms, abdominal pain, and changes in mental status. She denies recent travel, sick contacts, accidents, and head trauma. . She takes no blood thinning agents.     Pertinent physical Exam of patient completed. Patient is alert and orientated. PERRL, no vision loss. Airway patient. No slurred speech. No aphasia or dysarthria. No evidence of facial droop or facial palsy. Lungs clear to auscultation and breath sounds equal. Regular heart rate and rhythm, normal heart sounds, without pathological murmurs, ectopy, gallops, or rubs. Motor and sensory functions intact. No limb ataxia or motor drift. NIH Stroke Scale/Score: 0  POCT Glucose, CBC, CMP, POC Urine Pregnancy, UA, Venous pH, Lipase, Lactic Acid, Beta-Hydroxybutyrate ordered.  Fluids and Zofran given. EKG and Troponin ordered.     POC Urine Pregnancy negative. POCT Glucose 269.UA reveals UTI and trichomonas.  Venous pH 7.37. CBC, Lipase, Lactic Acid unremarkable. Beta-Hydroxybutyrate 1.37. CMP revealed Glucose 477, BUN 24, Creatinine 1.3, GFR 59. Patient given another liter of fluids. Urine C.trachomatis N.gonorrhoeae and repeat CMP ordered.     12:05 - Spoke with patient and she states she is feeling better and is no longer light headed.  All symptoms have resolved discussed that we gave her a second liter of fluid and are running repeat labs. Repeat CMP reveals Glucose 415, BUN Creatinine, and GFR within

## 2024-07-25 LAB
CHLAMYDIA DNA UR QL NAA+PROBE: NEGATIVE
N GONORRHOEA DNA UR QL NAA+PROBE: NEGATIVE
SPECIMEN DESCRIPTION: NORMAL

## 2024-08-16 RX ORDER — INSULIN GLARGINE 100 [IU]/ML
INJECTION, SOLUTION SUBCUTANEOUS
Qty: 9 ML | OUTPATIENT
Start: 2024-08-16

## 2024-08-21 ENCOUNTER — APPOINTMENT (OUTPATIENT)
Dept: GENERAL RADIOLOGY | Age: 29
End: 2024-08-21
Payer: COMMERCIAL

## 2024-08-21 ENCOUNTER — HOSPITAL ENCOUNTER (EMERGENCY)
Age: 29
Discharge: HOME | End: 2024-08-21
Attending: EMERGENCY MEDICINE
Payer: COMMERCIAL

## 2024-08-21 VITALS
TEMPERATURE: 97.9 F | RESPIRATION RATE: 14 BRPM | OXYGEN SATURATION: 98 % | BODY MASS INDEX: 22.48 KG/M2 | DIASTOLIC BLOOD PRESSURE: 82 MMHG | SYSTOLIC BLOOD PRESSURE: 116 MMHG | HEART RATE: 97 BPM | WEIGHT: 119 LBS

## 2024-08-21 DIAGNOSIS — R07.81 RIB PAIN ON RIGHT SIDE: Primary | ICD-10-CM

## 2024-08-21 PROCEDURE — 96372 THER/PROPH/DIAG INJ SC/IM: CPT

## 2024-08-21 PROCEDURE — 6360000002 HC RX W HCPCS

## 2024-08-21 PROCEDURE — 71046 X-RAY EXAM CHEST 2 VIEWS: CPT

## 2024-08-21 PROCEDURE — 99284 EMERGENCY DEPT VISIT MOD MDM: CPT

## 2024-08-21 RX ORDER — METHOCARBAMOL 750 MG/1
750 TABLET, FILM COATED ORAL 3 TIMES DAILY
Qty: 15 TABLET | Refills: 0 | Status: SHIPPED | OUTPATIENT
Start: 2024-08-21 | End: 2024-08-31

## 2024-08-21 RX ORDER — KETOROLAC TROMETHAMINE 15 MG/ML
15 INJECTION, SOLUTION INTRAMUSCULAR; INTRAVENOUS ONCE
Status: COMPLETED | OUTPATIENT
Start: 2024-08-21 | End: 2024-08-21

## 2024-08-21 RX ORDER — KETOROLAC TROMETHAMINE 15 MG/ML
15 INJECTION, SOLUTION INTRAMUSCULAR; INTRAVENOUS ONCE
Status: DISCONTINUED | OUTPATIENT
Start: 2024-08-21 | End: 2024-08-21

## 2024-08-21 RX ADMIN — KETOROLAC TROMETHAMINE 15 MG: 15 INJECTION, SOLUTION INTRAMUSCULAR; INTRAVENOUS at 12:58

## 2024-08-21 ASSESSMENT — PAIN SCALES - GENERAL: PAINLEVEL_OUTOF10: 9

## 2024-08-21 ASSESSMENT — PAIN DESCRIPTION - LOCATION: LOCATION: RIB CAGE

## 2024-08-21 ASSESSMENT — PAIN DESCRIPTION - ORIENTATION: ORIENTATION: RIGHT

## 2024-08-21 ASSESSMENT — PAIN - FUNCTIONAL ASSESSMENT: PAIN_FUNCTIONAL_ASSESSMENT: 0-10

## 2024-08-21 NOTE — DISCHARGE INSTRUCTIONS
Thank you for the opportunity to serve in your medical care today. Please be sure to take your prescribed medication as directed. Follow up with your doctor is critical for optimal healing. Should you have any new or worsening symptoms, please return to the Emergency Department for further work-up and evaluation.

## 2024-08-25 NOTE — ED PROVIDER NOTES
Department of Emergency Medicine     Written by: Luca Nino MD  Patient Name: Diana Mann  Admit Date: 2024 12:32 PM  MRN: [id number]                   : 1995      ------------------------- CC-------------------------  Chief Complaint   Patient presents with    Rib Pain (injury)     Patient was working when she felt a pop in her right side while working on Monday and has pain on her right side and neck ever since.     Neck Pain     ------------------------- HPI -------------------------    Diana Mann is a 28 y.o. female who presents to the emergency department today complaining of rib rib pain after she heard a pop while working on Monday, ever since she has had right-sided rib pain of 9 out of 10.   Patient denies fever, chills, headache, shortness of breath, chest pain, abdominal pain, nausea, vomiting, diarrhea, lightheadedness, dysuria, hematuria, hematochezia, and melena.    Nursing notes were all reviewed and agreed with or any disagreements were addressed in the HPI.    REVIEW OF SYSTEMS:    Pertinent positives and negatives mentioned in the HPI/MDM.     ------------------------- PAST HISTORY -------------------------    I personally reviewed the following history:    Past Medical History:  has a past medical history of Bleeding at insertion site, Common femoral artery injury, right, initial encounter, Depressive disorder, Diabetic ketoacidosis (HCC), DM type 1 (diabetes mellitus, type 1) (HCC), Marijuana abuse, Non-compliance, Primary hypertension, and Trichimoniasis.    Past Surgical History:  has a past surgical history that includes Abdomen surgery (N/A, 2019) and Bartholin gland cyst excision.    Social History:  reports that she has never smoked. She has never used smokeless tobacco. She reports current drug use. Frequency: 14.00 times per week. Drug: Marijuana (Weed). She reports that she does not drink alcohol.    Family History: family history includes Diabetes in her  abnormal. All other labs obtained during this visit were within normal range or not returned as of this dictation.    EKG obtained during ED visit is noted in the ED course.  EKG as read by me: Heart rate 106, Q RS 64, NE interval 150 QT/QTc 350/464.  Regular rate regular rhythm no axis deviation.  No significant change from EKG done on June 12, 2023.    RADIOLOGY:  Non-plain film images such as CT, Ultrasound and MRI are read by the radiologist. Plain radiographic images are visualized and preliminarily interpreted by the ED Provider with the below findings:    Interpretation per the Radiologist below, if available at the time of this note:    XR CHEST (2 VW)   Final Result   No acute process.      No significant interval change.           ------------------------------ MDM ------------------------------    History is obtained from patient for patient's acute onset of mild rib pain after hearing a pop at work.     Differential diagnoses considered in the workup of this patient include but are not limited to muscle strain, costochondritis, atypical chest pain, muscle spasm.    On presentation, patient does not appear ill, septic or in acute distress. On physical exam, mild right sided -rib tenderness on palpation.    Patient was given toradol for pain control.    EKG within patient's baseline, no concern for acute cardiac events. Imaging as read by radiologist showed no acute cardiopulmonary process.     On reassessment, patient indicated relief of pain and reported no symptoms. Rx provided for Robaxin.     Imaging, lab results, and the discharge plan were reviewed with the patient, including medication indications, usage, side effects, and the importance of following up on referrals. The patient demonstrated understanding, had all questions thoroughly answered, and remained hemodynamically stable throughout their ED course.    Is this patient to be included in the SEP-1 core measure? No Exclusion criteria - the

## 2024-10-01 ENCOUNTER — APPOINTMENT (OUTPATIENT)
Dept: ULTRASOUND IMAGING | Age: 29
End: 2024-10-01
Payer: COMMERCIAL

## 2024-10-01 ENCOUNTER — HOSPITAL ENCOUNTER (EMERGENCY)
Age: 29
Discharge: HOME | End: 2024-10-01
Attending: EMERGENCY MEDICINE
Payer: COMMERCIAL

## 2024-10-01 ENCOUNTER — APPOINTMENT (OUTPATIENT)
Dept: CT IMAGING | Age: 29
End: 2024-10-01
Payer: COMMERCIAL

## 2024-10-01 VITALS
RESPIRATION RATE: 16 BRPM | TEMPERATURE: 98.7 F | HEIGHT: 61 IN | OXYGEN SATURATION: 100 % | HEART RATE: 102 BPM | BODY MASS INDEX: 22.66 KG/M2 | WEIGHT: 120 LBS | DIASTOLIC BLOOD PRESSURE: 98 MMHG | SYSTOLIC BLOOD PRESSURE: 188 MMHG

## 2024-10-01 DIAGNOSIS — N83.10 CORPUS LUTEUM CYST: Primary | ICD-10-CM

## 2024-10-01 LAB
ALBUMIN SERPL-MCNC: 3.8 G/DL (ref 3.5–5.2)
ALP SERPL-CCNC: 85 U/L (ref 35–104)
ALT SERPL-CCNC: 25 U/L (ref 0–32)
ANION GAP SERPL CALCULATED.3IONS-SCNC: 9 MMOL/L (ref 7–16)
AST SERPL-CCNC: 28 U/L (ref 0–31)
BASOPHILS # BLD: 0.03 K/UL (ref 0–0.2)
BASOPHILS NFR BLD: 0 % (ref 0–2)
BILIRUB SERPL-MCNC: 0.3 MG/DL (ref 0–1.2)
BILIRUB UR QL STRIP: NEGATIVE
BUN SERPL-MCNC: 7 MG/DL (ref 6–20)
CALCIUM SERPL-MCNC: 9.2 MG/DL (ref 8.6–10.2)
CHLORIDE SERPL-SCNC: 106 MMOL/L (ref 98–107)
CLARITY UR: CLEAR
CO2 SERPL-SCNC: 24 MMOL/L (ref 22–29)
COLOR UR: YELLOW
CREAT SERPL-MCNC: 0.7 MG/DL (ref 0.5–1)
EOSINOPHIL # BLD: 0.05 K/UL (ref 0.05–0.5)
EOSINOPHILS RELATIVE PERCENT: 1 % (ref 0–6)
EPI CELLS #/AREA URNS HPF: ABNORMAL /HPF
ERYTHROCYTE [DISTWIDTH] IN BLOOD BY AUTOMATED COUNT: 13.9 % (ref 11.5–15)
GFR, ESTIMATED: >90 ML/MIN/1.73M2
GLUCOSE BLD-MCNC: 125 MG/DL (ref 74–99)
GLUCOSE SERPL-MCNC: 124 MG/DL (ref 74–99)
GLUCOSE UR STRIP-MCNC: 500 MG/DL
HCG, URINE, POC: NEGATIVE
HCT VFR BLD AUTO: 36.6 % (ref 34–48)
HGB BLD-MCNC: 12 G/DL (ref 11.5–15.5)
HGB UR QL STRIP.AUTO: ABNORMAL
IMM GRANULOCYTES # BLD AUTO: 0.04 K/UL (ref 0–0.58)
IMM GRANULOCYTES NFR BLD: 0 % (ref 0–5)
KETONES UR STRIP-MCNC: NEGATIVE MG/DL
LEUKOCYTE ESTERASE UR QL STRIP: NEGATIVE
LIPASE SERPL-CCNC: 11 U/L (ref 13–60)
LYMPHOCYTES NFR BLD: 2.6 K/UL (ref 1.5–4)
LYMPHOCYTES RELATIVE PERCENT: 26 % (ref 20–42)
Lab: NORMAL
MCH RBC QN AUTO: 27.5 PG (ref 26–35)
MCHC RBC AUTO-ENTMCNC: 32.8 G/DL (ref 32–34.5)
MCV RBC AUTO: 83.9 FL (ref 80–99.9)
MONOCYTES NFR BLD: 0.48 K/UL (ref 0.1–0.95)
MONOCYTES NFR BLD: 5 % (ref 2–12)
NEGATIVE QC PASS/FAIL: NORMAL
NEUTROPHILS NFR BLD: 69 % (ref 43–80)
NEUTS SEG NFR BLD: 6.97 K/UL (ref 1.8–7.3)
NITRITE UR QL STRIP: NEGATIVE
PH UR STRIP: 7.5 [PH] (ref 5–9)
PLATELET # BLD AUTO: 275 K/UL (ref 130–450)
PMV BLD AUTO: 11.1 FL (ref 7–12)
POSITIVE QC PASS/FAIL: NORMAL
POTASSIUM SERPL-SCNC: 3.7 MMOL/L (ref 3.5–5)
PROT SERPL-MCNC: 7.3 G/DL (ref 6.4–8.3)
PROT UR STRIP-MCNC: 100 MG/DL
RBC # BLD AUTO: 4.36 M/UL (ref 3.5–5.5)
RBC #/AREA URNS HPF: ABNORMAL /HPF
SODIUM SERPL-SCNC: 139 MMOL/L (ref 132–146)
SP GR UR STRIP: 1.02 (ref 1–1.03)
UROBILINOGEN UR STRIP-ACNC: 0.2 EU/DL (ref 0–1)
WBC #/AREA URNS HPF: ABNORMAL /HPF
WBC OTHER # BLD: 10.2 K/UL (ref 4.5–11.5)

## 2024-10-01 PROCEDURE — 99285 EMERGENCY DEPT VISIT HI MDM: CPT

## 2024-10-01 PROCEDURE — 74177 CT ABD & PELVIS W/CONTRAST: CPT

## 2024-10-01 PROCEDURE — 93975 VASCULAR STUDY: CPT

## 2024-10-01 PROCEDURE — 82962 GLUCOSE BLOOD TEST: CPT

## 2024-10-01 PROCEDURE — 87591 N.GONORRHOEAE DNA AMP PROB: CPT

## 2024-10-01 PROCEDURE — 96374 THER/PROPH/DIAG INJ IV PUSH: CPT

## 2024-10-01 PROCEDURE — 6360000002 HC RX W HCPCS

## 2024-10-01 PROCEDURE — 81001 URINALYSIS AUTO W/SCOPE: CPT

## 2024-10-01 PROCEDURE — 76856 US EXAM PELVIC COMPLETE: CPT

## 2024-10-01 PROCEDURE — 96361 HYDRATE IV INFUSION ADD-ON: CPT

## 2024-10-01 PROCEDURE — 87491 CHLMYD TRACH DNA AMP PROBE: CPT

## 2024-10-01 PROCEDURE — 2580000003 HC RX 258

## 2024-10-01 PROCEDURE — 96376 TX/PRO/DX INJ SAME DRUG ADON: CPT

## 2024-10-01 PROCEDURE — 83690 ASSAY OF LIPASE: CPT

## 2024-10-01 PROCEDURE — 6360000002 HC RX W HCPCS: Performed by: EMERGENCY MEDICINE

## 2024-10-01 PROCEDURE — 80053 COMPREHEN METABOLIC PANEL: CPT

## 2024-10-01 PROCEDURE — 85025 COMPLETE CBC W/AUTO DIFF WBC: CPT

## 2024-10-01 PROCEDURE — 6360000004 HC RX CONTRAST MEDICATION: Performed by: RADIOLOGY

## 2024-10-01 RX ORDER — 0.9 % SODIUM CHLORIDE 0.9 %
1000 INTRAVENOUS SOLUTION INTRAVENOUS ONCE
Status: COMPLETED | OUTPATIENT
Start: 2024-10-01 | End: 2024-10-01

## 2024-10-01 RX ORDER — MORPHINE SULFATE 4 MG/ML
4 INJECTION, SOLUTION INTRAMUSCULAR; INTRAVENOUS ONCE
Status: COMPLETED | OUTPATIENT
Start: 2024-10-01 | End: 2024-10-01

## 2024-10-01 RX ORDER — IBUPROFEN 600 MG/1
600 TABLET, FILM COATED ORAL EVERY 6 HOURS PRN
Qty: 20 TABLET | Refills: 0 | Status: SHIPPED | OUTPATIENT
Start: 2024-10-01

## 2024-10-01 RX ORDER — IOPAMIDOL 755 MG/ML
75 INJECTION, SOLUTION INTRAVASCULAR
Status: COMPLETED | OUTPATIENT
Start: 2024-10-01 | End: 2024-10-01

## 2024-10-01 RX ORDER — HYDROCODONE BITARTRATE AND ACETAMINOPHEN 5; 325 MG/1; MG/1
1-2 TABLET ORAL EVERY 6 HOURS PRN
Qty: 10 TABLET | Refills: 0 | Status: SHIPPED | OUTPATIENT
Start: 2024-10-01 | End: 2024-10-04

## 2024-10-01 RX ADMIN — SODIUM CHLORIDE 1000 ML: 9 INJECTION, SOLUTION INTRAVENOUS at 10:33

## 2024-10-01 RX ADMIN — SODIUM CHLORIDE 1000 ML: 9 INJECTION, SOLUTION INTRAVENOUS at 10:38

## 2024-10-01 RX ADMIN — MORPHINE SULFATE 4 MG: 4 INJECTION, SOLUTION INTRAMUSCULAR; INTRAVENOUS at 10:36

## 2024-10-01 RX ADMIN — MORPHINE SULFATE 4 MG: 4 INJECTION, SOLUTION INTRAMUSCULAR; INTRAVENOUS at 13:15

## 2024-10-01 RX ADMIN — IOPAMIDOL 75 ML: 755 INJECTION, SOLUTION INTRAVENOUS at 11:47

## 2024-10-01 ASSESSMENT — PAIN SCALES - GENERAL
PAINLEVEL_OUTOF10: 10
PAINLEVEL_OUTOF10: 10
PAINLEVEL_OUTOF10: 9

## 2024-10-01 ASSESSMENT — PAIN DESCRIPTION - LOCATION: LOCATION: ABDOMEN

## 2024-10-01 ASSESSMENT — PAIN - FUNCTIONAL ASSESSMENT: PAIN_FUNCTIONAL_ASSESSMENT: 0-10

## 2024-10-01 NOTE — ED PROVIDER NOTES
instructions.    Extensively reviewed lab and imaging findings with patient/family members/available representative parties and all questions answered at this time to the fullest extent possible.  Patient has been closely monitored with multiple reevaluations and has remained hemodynamically stable.  They have clinically improved and through shared decision-making, patient is to be discharged to home. Patient is understanding and agreeable with this.  They have been instructed to follow-up with family physician and OB/GYN as soon as possible and are provided with strict return precautions as to which they should return to the nearest available emergency department.  Patient acknowledges understanding of all of these instructions and is to be discharged at this time.    Differential diagnosis includes but is not limited to: Ovarian torsion, ectopic pregnancy, pregnancy, appendicitis, perforated viscus, bowel obstruction, acute cystitis, pyelonephritis    Please refer to the ED Course as available for additional MDM.  ED Course as of 10/01/24 1654   Tue Oct 01, 2024   0953 ATTENDING PROVIDER ATTESTATION:     I have personally performed and/or participated in the history, exam, medical decision making, and procedures and agree with all pertinent clinical information.      I have also reviewed and agree with the past medical, family and social history unless otherwise noted.    I have discussed this patient in detail with the resident, and provided the instruction and education regarding lower abdominal pain since yesterday.  Patient states has had a white vaginal discharge.  No bleeding.  Pain is worse with movement.  She is tender to palpation.  She has a history of diabetes but states her blood sugars have been fairly normal recently.  There is slightly elevated today but that is all.  She is nauseated with the pain.  She undergo laboratory evaluation, imaging, treatment emergency department and reassessment for

## 2024-10-02 ENCOUNTER — APPOINTMENT (OUTPATIENT)
Dept: ULTRASOUND IMAGING | Age: 29
End: 2024-10-02
Payer: COMMERCIAL

## 2024-10-02 ENCOUNTER — HOSPITAL ENCOUNTER (EMERGENCY)
Age: 29
Discharge: HOME | End: 2024-10-02
Attending: EMERGENCY MEDICINE
Payer: COMMERCIAL

## 2024-10-02 VITALS
HEIGHT: 61 IN | WEIGHT: 120 LBS | DIASTOLIC BLOOD PRESSURE: 93 MMHG | SYSTOLIC BLOOD PRESSURE: 160 MMHG | TEMPERATURE: 97.9 F | OXYGEN SATURATION: 100 % | BODY MASS INDEX: 22.66 KG/M2 | HEART RATE: 96 BPM | RESPIRATION RATE: 18 BRPM

## 2024-10-02 DIAGNOSIS — N83.10: Primary | ICD-10-CM

## 2024-10-02 LAB
B-HCG SERPL EIA 3RD IS-ACNC: <0.1 MIU/ML (ref 0–7)
GLUCOSE BLD-MCNC: 185 MG/DL (ref 74–99)

## 2024-10-02 PROCEDURE — 93975 VASCULAR STUDY: CPT

## 2024-10-02 PROCEDURE — 82962 GLUCOSE BLOOD TEST: CPT

## 2024-10-02 PROCEDURE — 99284 EMERGENCY DEPT VISIT MOD MDM: CPT

## 2024-10-02 PROCEDURE — 96375 TX/PRO/DX INJ NEW DRUG ADDON: CPT

## 2024-10-02 PROCEDURE — 84702 CHORIONIC GONADOTROPIN TEST: CPT

## 2024-10-02 PROCEDURE — 96374 THER/PROPH/DIAG INJ IV PUSH: CPT

## 2024-10-02 PROCEDURE — 6370000000 HC RX 637 (ALT 250 FOR IP): Performed by: STUDENT IN AN ORGANIZED HEALTH CARE EDUCATION/TRAINING PROGRAM

## 2024-10-02 PROCEDURE — 76856 US EXAM PELVIC COMPLETE: CPT

## 2024-10-02 PROCEDURE — 6360000002 HC RX W HCPCS

## 2024-10-02 RX ORDER — OXYCODONE HYDROCHLORIDE 5 MG/1
5 TABLET ORAL ONCE
Status: COMPLETED | OUTPATIENT
Start: 2024-10-02 | End: 2024-10-02

## 2024-10-02 RX ORDER — HYDROMORPHONE HYDROCHLORIDE 2 MG/1
2 TABLET ORAL EVERY 6 HOURS PRN
Qty: 6 TABLET | Refills: 0 | Status: SHIPPED | OUTPATIENT
Start: 2024-10-02 | End: 2024-10-02 | Stop reason: ALTCHOICE

## 2024-10-02 RX ORDER — ONDANSETRON 2 MG/ML
4 INJECTION INTRAMUSCULAR; INTRAVENOUS ONCE
Status: COMPLETED | OUTPATIENT
Start: 2024-10-02 | End: 2024-10-02

## 2024-10-02 RX ORDER — MORPHINE SULFATE 4 MG/ML
4 INJECTION, SOLUTION INTRAMUSCULAR; INTRAVENOUS ONCE
Status: COMPLETED | OUTPATIENT
Start: 2024-10-02 | End: 2024-10-02

## 2024-10-02 RX ADMIN — ONDANSETRON 4 MG: 2 INJECTION INTRAMUSCULAR; INTRAVENOUS at 13:11

## 2024-10-02 RX ADMIN — OXYCODONE HYDROCHLORIDE 5 MG: 5 TABLET ORAL at 18:43

## 2024-10-02 RX ADMIN — HYDROMORPHONE HYDROCHLORIDE 1 MG: 1 INJECTION, SOLUTION INTRAMUSCULAR; INTRAVENOUS; SUBCUTANEOUS at 15:17

## 2024-10-02 RX ADMIN — MORPHINE SULFATE 4 MG: 4 INJECTION, SOLUTION INTRAMUSCULAR; INTRAVENOUS at 13:12

## 2024-10-02 ASSESSMENT — PAIN SCALES - GENERAL
PAINLEVEL_OUTOF10: 8
PAINLEVEL_OUTOF10: 10
PAINLEVEL_OUTOF10: 10

## 2024-10-02 ASSESSMENT — PAIN DESCRIPTION - ORIENTATION
ORIENTATION: RIGHT;LEFT;UPPER
ORIENTATION: RIGHT;LEFT;LOWER

## 2024-10-02 ASSESSMENT — PAIN DESCRIPTION - PAIN TYPE
TYPE: ACUTE PAIN
TYPE: ACUTE PAIN

## 2024-10-02 ASSESSMENT — PAIN DESCRIPTION - LOCATION
LOCATION: ABDOMEN

## 2024-10-02 ASSESSMENT — PAIN DESCRIPTION - DESCRIPTORS
DESCRIPTORS: SHARP;CRAMPING
DESCRIPTORS: ACHING
DESCRIPTORS: CRAMPING
DESCRIPTORS: CRAMPING

## 2024-10-02 ASSESSMENT — PAIN - FUNCTIONAL ASSESSMENT: PAIN_FUNCTIONAL_ASSESSMENT: 0-10

## 2024-10-02 ASSESSMENT — LIFESTYLE VARIABLES
HOW MANY STANDARD DRINKS CONTAINING ALCOHOL DO YOU HAVE ON A TYPICAL DAY: PATIENT DOES NOT DRINK
HOW MANY STANDARD DRINKS CONTAINING ALCOHOL DO YOU HAVE ON A TYPICAL DAY: PATIENT DOES NOT DRINK
HOW OFTEN DO YOU HAVE A DRINK CONTAINING ALCOHOL: NEVER
HOW OFTEN DO YOU HAVE A DRINK CONTAINING ALCOHOL: NEVER

## 2024-10-02 ASSESSMENT — PAIN DESCRIPTION - FREQUENCY: FREQUENCY: CONTINUOUS

## 2024-10-02 NOTE — ED NOTES
Clarified discharge medication was correct with Dr Ritchie.  states yes that is what he wants ordered.

## 2024-10-02 NOTE — DISCHARGE INSTR - COC
Score:  Readmission Risk              Risk of Unplanned Readmission:  0           Discharging to Facility/ Agency   Name:   Address:  Phone:  Fax:    Dialysis Facility (if applicable)   Name:  Address:  Dialysis Schedule:  Phone:  Fax:    / signature: {Esignature:[id number]}    PHYSICIAN SECTION    Prognosis: {Prognosis:1828693345}    Condition at Discharge: { Patient Condition:030753162}    Rehab Potential (if transferring to Rehab): {Prognosis:2398083623}    Recommended Labs or Other Treatments After Discharge: ***    Physician Certification: I certify the above information and transfer of Diana Mann  is necessary for the continuing treatment of the diagnosis listed and that she requires {Admit to Appropriate Level of Care:64476} for {GREATER/LESS:879696316} 30 days.     Update Admission H&P: {CHP DME Changes in HandP:297809437}    PHYSICIAN SIGNATURE:  {Esignature:[id number]}

## 2024-10-02 NOTE — ED PROVIDER NOTES
seen and evaluated for the ER yesterday for same complaint, was found to have a luteal cyst, possibly in the setting of mittelschmerz, was sent home with NSAIDs and analgesic for her pain until she can see her OB/GYN.  Patient states that she did  her prescription, her pain medication did not help and pains and worsened.  On exam patient is uncomfortable appearing secondary to pain, minimally tachycardic, lungs CTAB, abdominal exam with moderate to severe tenderness to palpation over the left lower quadrant, voluntary guarding.  No overlying skin changes, no abdominal distention or masses appreciated.  Differential diagnosis includes not limited to pregnancy, hyperglycemia, ovarian torsion, ruptured ovarian cyst.  Patient's workup in the ER today showing no ovarian torsion on ultrasound, reconfirming luteal cyst.  Patient required 1 dose of antiemetic, 2 dose of analgesic via IV.  Patient with adequate pain control at this time,   States that she will take her prescribed analgesic at home and has a follow-up appointment with her OB/GYN scheduled for next week.  Patient given strict return precautions, she verbalized understanding to this.  Given patient's reassuring vital signs, nontoxic appearance upon reevaluation at bedside, patient felt to be stable for discharge with close outpatient follow-up at this time.  Patient's results were explained, given opportunity to ask questions.  Patient is comfortable with discharge plan with close outpatient follow-up.  Strict return precautions discussed at length with patient, verbalizes understanding.  Patient discharged in stable condition.      CONSULTS:   None        PROCEDURES   Unless otherwise noted below, none       CRITICAL CARE TIME (.cct)           I am the Primary Clinician of Record.    FINAL IMPRESSION      1. Luteal cystic ovary disease          DISPOSITION/PLAN     DISPOSITION Decision To Discharge 10/02/2024 05:01:20 PM      PATIENT REFERRED TO:  Adair

## 2024-10-02 NOTE — DISCHARGE INSTRUCTIONS
Please follow-up with your OB/GYN.  Please follow-up with your primary care doctor for further evaluation of your symptoms and for ER follow-up.  Please return to the ER for any new or worsening symptoms.

## 2024-10-03 LAB
C TRACH DNA SPEC QL PROBE+SIG AMP: NEGATIVE
N GONORRHOEA DNA SPEC QL PROBE+SIG AMP: NEGATIVE
SPECIMEN DESCRIPTION: NORMAL

## 2024-10-05 ENCOUNTER — APPOINTMENT (OUTPATIENT)
Dept: ULTRASOUND IMAGING | Age: 29
End: 2024-10-05
Payer: COMMERCIAL

## 2024-10-05 ENCOUNTER — HOSPITAL ENCOUNTER (EMERGENCY)
Age: 29
Discharge: HOME | End: 2024-10-05
Attending: EMERGENCY MEDICINE
Payer: COMMERCIAL

## 2024-10-05 VITALS
SYSTOLIC BLOOD PRESSURE: 138 MMHG | HEART RATE: 102 BPM | OXYGEN SATURATION: 100 % | TEMPERATURE: 97.5 F | HEIGHT: 61 IN | WEIGHT: 120 LBS | BODY MASS INDEX: 22.66 KG/M2 | DIASTOLIC BLOOD PRESSURE: 105 MMHG | RESPIRATION RATE: 14 BRPM

## 2024-10-05 DIAGNOSIS — R73.9 HYPERGLYCEMIA: ICD-10-CM

## 2024-10-05 DIAGNOSIS — R11.2 NAUSEA AND VOMITING, UNSPECIFIED VOMITING TYPE: Primary | ICD-10-CM

## 2024-10-05 DIAGNOSIS — N83.202 LEFT OVARIAN CYST: ICD-10-CM

## 2024-10-05 DIAGNOSIS — R10.84 GENERALIZED ABDOMINAL PAIN: ICD-10-CM

## 2024-10-05 DIAGNOSIS — E86.0 DEHYDRATION: ICD-10-CM

## 2024-10-05 DIAGNOSIS — I10 HYPERTENSION, UNSPECIFIED TYPE: ICD-10-CM

## 2024-10-05 LAB
ALBUMIN SERPL-MCNC: 4.6 G/DL (ref 3.5–5.2)
ALP SERPL-CCNC: 109 U/L (ref 35–104)
ALT SERPL-CCNC: 31 U/L (ref 0–32)
ANION GAP SERPL CALCULATED.3IONS-SCNC: 17 MMOL/L (ref 7–16)
AST SERPL-CCNC: 36 U/L (ref 0–31)
B-OH-BUTYR SERPL-MCNC: 3.61 MMOL/L (ref 0.02–0.27)
BASOPHILS # BLD: 0.02 K/UL (ref 0–0.2)
BASOPHILS NFR BLD: 0 % (ref 0–2)
BILIRUB SERPL-MCNC: 0.6 MG/DL (ref 0–1.2)
BILIRUB UR QL STRIP: NEGATIVE
BUN SERPL-MCNC: 19 MG/DL (ref 6–20)
CALCIUM SERPL-MCNC: 10.3 MG/DL (ref 8.6–10.2)
CHLORIDE SERPL-SCNC: 95 MMOL/L (ref 98–107)
CLARITY UR: CLEAR
CO2 SERPL-SCNC: 22 MMOL/L (ref 22–29)
COLOR UR: YELLOW
CREAT SERPL-MCNC: 1 MG/DL (ref 0.5–1)
EOSINOPHIL # BLD: 0.02 K/UL (ref 0.05–0.5)
EOSINOPHILS RELATIVE PERCENT: 0 % (ref 0–6)
ERYTHROCYTE [DISTWIDTH] IN BLOOD BY AUTOMATED COUNT: 13.4 % (ref 11.5–15)
GFR, ESTIMATED: 81 ML/MIN/1.73M2
GLUCOSE BLD-MCNC: 380 MG/DL (ref 74–99)
GLUCOSE SERPL-MCNC: 426 MG/DL (ref 74–99)
GLUCOSE UR STRIP-MCNC: >=1000 MG/DL
HCG, URINE, POC: NEGATIVE
HCT VFR BLD AUTO: 40 % (ref 34–48)
HGB BLD-MCNC: 13.3 G/DL (ref 11.5–15.5)
HGB UR QL STRIP.AUTO: ABNORMAL
IMM GRANULOCYTES # BLD AUTO: <0.03 K/UL (ref 0–0.58)
IMM GRANULOCYTES NFR BLD: 0 % (ref 0–5)
KETONES UR STRIP-MCNC: >80 MG/DL
LACTATE BLDV-SCNC: 2.5 MMOL/L (ref 0.5–2.2)
LEUKOCYTE ESTERASE UR QL STRIP: NEGATIVE
LIPASE SERPL-CCNC: 7 U/L (ref 13–60)
LYMPHOCYTES NFR BLD: 1.99 K/UL (ref 1.5–4)
LYMPHOCYTES RELATIVE PERCENT: 24 % (ref 20–42)
Lab: NORMAL
MCH RBC QN AUTO: 27.5 PG (ref 26–35)
MCHC RBC AUTO-ENTMCNC: 33.3 G/DL (ref 32–34.5)
MCV RBC AUTO: 82.6 FL (ref 80–99.9)
MONOCYTES NFR BLD: 0.33 K/UL (ref 0.1–0.95)
MONOCYTES NFR BLD: 4 % (ref 2–12)
NEGATIVE QC PASS/FAIL: NORMAL
NEUTROPHILS NFR BLD: 72 % (ref 43–80)
NEUTS SEG NFR BLD: 6.04 K/UL (ref 1.8–7.3)
NITRITE UR QL STRIP: NEGATIVE
PH UR STRIP: 6 [PH] (ref 5–9)
PH VENOUS: 7.38 (ref 7.35–7.45)
PLATELET # BLD AUTO: 272 K/UL (ref 130–450)
PMV BLD AUTO: 10.7 FL (ref 7–12)
POSITIVE QC PASS/FAIL: NORMAL
POTASSIUM SERPL-SCNC: 3.6 MMOL/L (ref 3.5–5)
PROT SERPL-MCNC: 8.7 G/DL (ref 6.4–8.3)
PROT UR STRIP-MCNC: >=300 MG/DL
RBC # BLD AUTO: 4.84 M/UL (ref 3.5–5.5)
RBC #/AREA URNS HPF: ABNORMAL /HPF
SODIUM SERPL-SCNC: 134 MMOL/L (ref 132–146)
SP GR UR STRIP: 1.01 (ref 1–1.03)
UROBILINOGEN UR STRIP-ACNC: 0.2 EU/DL (ref 0–1)
WBC #/AREA URNS HPF: ABNORMAL /HPF
WBC OTHER # BLD: 8.4 K/UL (ref 4.5–11.5)

## 2024-10-05 PROCEDURE — 80053 COMPREHEN METABOLIC PANEL: CPT

## 2024-10-05 PROCEDURE — 82800 BLOOD PH: CPT

## 2024-10-05 PROCEDURE — 83605 ASSAY OF LACTIC ACID: CPT

## 2024-10-05 PROCEDURE — 2580000003 HC RX 258: Performed by: STUDENT IN AN ORGANIZED HEALTH CARE EDUCATION/TRAINING PROGRAM

## 2024-10-05 PROCEDURE — 99284 EMERGENCY DEPT VISIT MOD MDM: CPT

## 2024-10-05 PROCEDURE — 6360000002 HC RX W HCPCS: Performed by: STUDENT IN AN ORGANIZED HEALTH CARE EDUCATION/TRAINING PROGRAM

## 2024-10-05 PROCEDURE — 96375 TX/PRO/DX INJ NEW DRUG ADDON: CPT

## 2024-10-05 PROCEDURE — 83690 ASSAY OF LIPASE: CPT

## 2024-10-05 PROCEDURE — 82962 GLUCOSE BLOOD TEST: CPT

## 2024-10-05 PROCEDURE — 93975 VASCULAR STUDY: CPT

## 2024-10-05 PROCEDURE — 85025 COMPLETE CBC W/AUTO DIFF WBC: CPT

## 2024-10-05 PROCEDURE — 76856 US EXAM PELVIC COMPLETE: CPT

## 2024-10-05 PROCEDURE — 81001 URINALYSIS AUTO W/SCOPE: CPT

## 2024-10-05 PROCEDURE — 96361 HYDRATE IV INFUSION ADD-ON: CPT

## 2024-10-05 PROCEDURE — 96376 TX/PRO/DX INJ SAME DRUG ADON: CPT

## 2024-10-05 PROCEDURE — 82010 KETONE BODYS QUAN: CPT

## 2024-10-05 PROCEDURE — 96374 THER/PROPH/DIAG INJ IV PUSH: CPT

## 2024-10-05 PROCEDURE — 2580000003 HC RX 258: Performed by: EMERGENCY MEDICINE

## 2024-10-05 RX ORDER — SODIUM CHLORIDE, SODIUM LACTATE, POTASSIUM CHLORIDE, AND CALCIUM CHLORIDE .6; .31; .03; .02 G/100ML; G/100ML; G/100ML; G/100ML
1000 INJECTION, SOLUTION INTRAVENOUS ONCE
Status: COMPLETED | OUTPATIENT
Start: 2024-10-05 | End: 2024-10-05

## 2024-10-05 RX ORDER — 0.9 % SODIUM CHLORIDE 0.9 %
1000 INTRAVENOUS SOLUTION INTRAVENOUS ONCE
Status: DISCONTINUED | OUTPATIENT
Start: 2024-10-05 | End: 2024-10-05

## 2024-10-05 RX ORDER — 0.9 % SODIUM CHLORIDE 0.9 %
1000 INTRAVENOUS SOLUTION INTRAVENOUS ONCE
Status: COMPLETED | OUTPATIENT
Start: 2024-10-05 | End: 2024-10-05

## 2024-10-05 RX ORDER — DROPERIDOL 2.5 MG/ML
1.25 INJECTION, SOLUTION INTRAMUSCULAR; INTRAVENOUS ONCE
Status: COMPLETED | OUTPATIENT
Start: 2024-10-05 | End: 2024-10-05

## 2024-10-05 RX ORDER — MORPHINE SULFATE 4 MG/ML
4 INJECTION, SOLUTION INTRAMUSCULAR; INTRAVENOUS ONCE
Status: COMPLETED | OUTPATIENT
Start: 2024-10-05 | End: 2024-10-05

## 2024-10-05 RX ORDER — DICYCLOMINE HYDROCHLORIDE 10 MG/1
10 CAPSULE ORAL
Qty: 40 CAPSULE | Refills: 0 | Status: SHIPPED | OUTPATIENT
Start: 2024-10-05 | End: 2024-10-15

## 2024-10-05 RX ORDER — KETOROLAC TROMETHAMINE 15 MG/ML
15 INJECTION, SOLUTION INTRAMUSCULAR; INTRAVENOUS ONCE
Status: DISCONTINUED | OUTPATIENT
Start: 2024-10-05 | End: 2024-10-05 | Stop reason: HOSPADM

## 2024-10-05 RX ORDER — DROPERIDOL 2.5 MG/ML
0.62 INJECTION, SOLUTION INTRAMUSCULAR; INTRAVENOUS EVERY 6 HOURS PRN
Status: DISCONTINUED | OUTPATIENT
Start: 2024-10-05 | End: 2024-10-05 | Stop reason: HOSPADM

## 2024-10-05 RX ADMIN — DROPERIDOL 1.25 MG: 2.5 INJECTION, SOLUTION INTRAMUSCULAR; INTRAVENOUS at 10:09

## 2024-10-05 RX ADMIN — SODIUM CHLORIDE, POTASSIUM CHLORIDE, SODIUM LACTATE AND CALCIUM CHLORIDE 1000 ML: 600; 310; 30; 20 INJECTION, SOLUTION INTRAVENOUS at 10:08

## 2024-10-05 RX ADMIN — SODIUM CHLORIDE 1000 ML: 9 INJECTION, SOLUTION INTRAVENOUS at 09:11

## 2024-10-05 RX ADMIN — MORPHINE SULFATE 4 MG: 4 INJECTION, SOLUTION INTRAMUSCULAR; INTRAVENOUS at 10:42

## 2024-10-05 RX ADMIN — DROPERIDOL 0.62 MG: 2.5 INJECTION, SOLUTION INTRAMUSCULAR; INTRAVENOUS at 09:11

## 2024-10-05 ASSESSMENT — PAIN DESCRIPTION - DESCRIPTORS: DESCRIPTORS: BURNING

## 2024-10-05 ASSESSMENT — PAIN SCALES - GENERAL: PAINLEVEL_OUTOF10: 10

## 2024-10-05 ASSESSMENT — PAIN - FUNCTIONAL ASSESSMENT: PAIN_FUNCTIONAL_ASSESSMENT: 0-10

## 2024-10-05 ASSESSMENT — PAIN DESCRIPTION - LOCATION: LOCATION: ABDOMEN;BACK

## 2024-10-05 NOTE — DISCHARGE INSTRUCTIONS
Please follow-up with your primary care doctor and OB/GYN as previously discussed from your prior ED visit.  Please return the hospital for any new or worsening symptoms.    Continue with ibuprofen as needed.    Please stay well-hydrated at home.  Please keep up with monitoring your blood sugars.    Please follow up with your OBGYN on your results  US DUP ABD PEL RETRO SCROT COMPLETE   Final Result   1.  Normal uterus and right ovary.  No ovarian torsion or acute pelvic   disease identified.      2.  The previously seen left ovarian complex structure in the 3 cm range is   currently decreased in size, a benign finding, and compatible with partial   collapse and involution of a functional corpus luteal cyst.         US PELVIS COMPLETE   Final Result   1.  Normal uterus and right ovary.  No ovarian torsion or acute pelvic   disease identified.      2.  The previously seen left ovarian complex structure in the 3 cm range is   currently decreased in size, a benign finding, and compatible with partial   collapse and involution of a functional corpus luteal cyst.

## 2024-10-05 NOTE — ED PROVIDER NOTES
Name: Diana Mann    MRN: [id number]     Date / Time Roomed:  10/5/2024  8:29 AM  ED Bed Assignment:  16/16    ------------------ History of Present Illness --------------------  10/5/24, Time: 10:34 AM EDT   Chief Complaint   Patient presents with    Emesis     0700 today     Abdominal Pain     0700. Hx: ovarian cysts LMP 9/15/24      HPI    Diana Mann is a 28 y.o. female, with hx of diabetes, prior DKA, depression, marijuana abuse, hypertension, ovarian cyst, who presents to the ED today for abdominal pain and nausea vomiting which she states started several days ago and worsened last night and this morning.  She states she came to the ED on Wednesday, 3 days ago and was discovered to have ovarian cyst and was discharged home with ibuprofen and Norco prescription.  She states she finished the Norco prescription and does not have any more of her pain pills left.  She states that pain today is a continuation of that pain that she was having before.  She related having nausea and vomiting throughout the night and this morning.  She denies any urinary complaints.  She does states she has recently had constipation however relates having normal bowel movement this morning.  She denies any chest pain or shortness of breath.  Denies fever.  She states that she was post to follow-up with OB/GYN for her ovarian cyst however has not made an appointment yet. The pt denies other ROS at this time.    PCP: Opal Iyer MD.    -------------------- PMH --------------------    Past Medical History:   has a past medical history of Bleeding at insertion site (01/05/2018), Common femoral artery injury, right, initial encounter (01/05/2018), Depressive disorder, Diabetic ketoacidosis (HCC), DM type 1 (diabetes mellitus, type 1) (HCC), Marijuana abuse, Non-compliance, Primary hypertension (6/2/2023), and Trichimoniasis (11/19/2021).     Surgical History:  Past Surgical History:   Procedure Laterality Date    ABDOMEN

## 2024-10-05 NOTE — ED PROVIDER NOTES
Name: Diana Mann    MRN: [id number]     Date / Time Roomed:  10/5/2024  8:29 AM  ED Bed Assignment:  16/16    ------------------ History of Present Illness --------------------  10/5/24, Time: 8:32 AM EDT   Chief Complaint   Patient presents with   • Emesis     0700 today    • Abdominal Pain     0700. Hx: ovarian cysts LMP 9/15/24      HPI    Diana Mann is a 28 y.o. female, with hx of diabetes, prior DKA, depression, marijuana abuse, hypertension, ovarian cyst, who presents to the ED today for abdominal pain and nausea vomiting which she states started several days ago and worsened last night and this morning.  She states she came to the ED on Wednesday, 3 days ago and was discovered to have ovarian cyst and was discharged home with ibuprofen and Norco prescription.  She states she finished the Norco prescription and does not have any more of her pain pills left.  She states that pain today is a continuation of that pain that she was having before.  She related having nausea and vomiting throughout the night and this morning.  She denies any urinary complaints.  She does states she has recently had constipation however relates having normal bowel movement this morning.  She denies any chest pain or shortness of breath.  Denies fever.  She states that she was post to follow-up with OB/GYN for her ovarian cyst however has not made an appointment yet. The pt denies other ROS at this time.     PCP: Opal Iyer MD.    -------------------- PMH --------------------    Past Medical History:   has a past medical history of Bleeding at insertion site (01/05/2018), Common femoral artery injury, right, initial encounter (01/05/2018), Depressive disorder, Diabetic ketoacidosis (HCC), DM type 1 (diabetes mellitus, type 1) (HCC), Marijuana abuse, Non-compliance, Primary hypertension (6/2/2023), and Trichimoniasis (11/19/2021).     Surgical History:  Past Surgical History:   Procedure Laterality Date   • ABDOMEN

## 2024-11-07 DIAGNOSIS — F51.01 PRIMARY INSOMNIA: ICD-10-CM

## 2024-11-07 RX ORDER — ZOLPIDEM TARTRATE 10 MG/1
TABLET ORAL
Qty: 30 TABLET | Refills: 0 | Status: SHIPPED | OUTPATIENT
Start: 2024-11-07 | End: 2025-02-05

## 2024-11-07 NOTE — TELEPHONE ENCOUNTER
Name of Medication(s) Requested:  Requested Prescriptions     Pending Prescriptions Disp Refills    zolpidem (AMBIEN) 10 MG tablet [Pharmacy Med Name: zolpidem 10 mg tablet] 90 tablet 0     Sig: TAKE ONE TABLET BY MOUTH EVERY NIGHT AT BEDTIME. max DAILY DOSE 10 MG       Medication is on current medication list Yes    Dosage and directions were verified? Yes    Quantity verified: 90 day supply     Pharmacy Verified?  Yes    Last Appointment:  7/16/2024    Future appts:  Future Appointments   Date Time Provider Department Center   11/15/2024  3:00 PM Opal Iyer MD [facility]   2/13/2025  8:00 AM John Iverson, APRN - [facility]        (If no appt send self scheduling link. .REFILLAPPT)  Scheduling request sent?     [] Yes  [x] No    Does patient need updated?  [] Yes  [x] No

## 2024-11-15 ENCOUNTER — TELEPHONE (OUTPATIENT)
Dept: PRIMARY CARE CLINIC | Age: 29
End: 2024-11-15

## 2024-11-19 ENCOUNTER — TELEMEDICINE (OUTPATIENT)
Dept: ENDOCRINOLOGY | Age: 29
End: 2024-11-19
Payer: COMMERCIAL

## 2024-11-19 DIAGNOSIS — R80.9 MICROALBUMINURIA: ICD-10-CM

## 2024-11-19 DIAGNOSIS — E78.5 HYPERLIPIDEMIA, UNSPECIFIED HYPERLIPIDEMIA TYPE: ICD-10-CM

## 2024-11-19 DIAGNOSIS — Z91.119 DIETARY NONCOMPLIANCE: ICD-10-CM

## 2024-11-19 DIAGNOSIS — E55.9 VITAMIN D DEFICIENCY: ICD-10-CM

## 2024-11-19 DIAGNOSIS — E10.43 DIABETIC GASTROPARESIS ASSOCIATED WITH TYPE 1 DIABETES MELLITUS (HCC): ICD-10-CM

## 2024-11-19 DIAGNOSIS — K31.84 DIABETIC GASTROPARESIS ASSOCIATED WITH TYPE 1 DIABETES MELLITUS (HCC): ICD-10-CM

## 2024-11-19 DIAGNOSIS — E10.65 TYPE 1 DIABETES MELLITUS WITH HYPERGLYCEMIA (HCC): Primary | ICD-10-CM

## 2024-11-19 PROCEDURE — G8427 DOCREV CUR MEDS BY ELIG CLIN: HCPCS | Performed by: FAMILY MEDICINE

## 2024-11-19 PROCEDURE — 1036F TOBACCO NON-USER: CPT | Performed by: FAMILY MEDICINE

## 2024-11-19 PROCEDURE — G8484 FLU IMMUNIZE NO ADMIN: HCPCS | Performed by: FAMILY MEDICINE

## 2024-11-19 PROCEDURE — 95251 CONT GLUC MNTR ANALYSIS I&R: CPT | Performed by: FAMILY MEDICINE

## 2024-11-19 PROCEDURE — G8420 CALC BMI NORM PARAMETERS: HCPCS | Performed by: FAMILY MEDICINE

## 2024-11-19 PROCEDURE — 99214 OFFICE O/P EST MOD 30 MIN: CPT | Performed by: FAMILY MEDICINE

## 2024-11-19 PROCEDURE — 2022F DILAT RTA XM EVC RTNOPTHY: CPT | Performed by: FAMILY MEDICINE

## 2024-11-19 PROCEDURE — 3046F HEMOGLOBIN A1C LEVEL >9.0%: CPT | Performed by: FAMILY MEDICINE

## 2024-11-19 RX ORDER — INSULIN GLARGINE 100 [IU]/ML
16 INJECTION, SOLUTION SUBCUTANEOUS NIGHTLY
Qty: 5 ADJUSTABLE DOSE PRE-FILLED PEN SYRINGE | Refills: 11 | Status: ON HOLD
Start: 2024-11-19 | End: 2024-11-22 | Stop reason: HOSPADM

## 2024-11-19 RX ORDER — INSULIN PMP CART,AUT,G6/7,CNTR
EACH SUBCUTANEOUS
Qty: 30 EACH | Refills: 3 | Status: SHIPPED | OUTPATIENT
Start: 2024-11-19

## 2024-11-19 RX ORDER — IBUPROFEN 200 MG
1 TABLET ORAL DAILY
Qty: 120 EACH | Refills: 11 | Status: SHIPPED | OUTPATIENT
Start: 2024-11-19

## 2024-11-19 RX ORDER — INSULIN LISPRO 100 [IU]/ML
INJECTION, SOLUTION INTRAVENOUS; SUBCUTANEOUS
Qty: 30 ML | Refills: 11 | Status: ON HOLD
Start: 2024-11-19 | End: 2024-11-22 | Stop reason: HOSPADM

## 2024-11-19 RX ORDER — ACYCLOVIR 400 MG/1
TABLET ORAL
Qty: 9 EACH | Refills: 3 | Status: SHIPPED | OUTPATIENT
Start: 2024-11-19

## 2024-11-19 NOTE — PROGRESS NOTES
[facility] [place] [facility] Department of Endocrinology Diabetes and Metabolism   [address] [place] [address]    Phone: [phone]  Fax: [phone]    Date of Service: 11/19/2024  Primary Care Physician: Opal Iyer MD.  Provider: MIKE Foster CNP      Reason for the visit:  Type 1 DM    History of Present Illness:   The history is provided by the patient. No  was used. Accuracy of the patient data is excellent.  Diana Mann is a very pleasant 29 y.o. female seen today for follow up visit     Last office visit June 2024    Diana Mann was diagnosed with diabetes at age 13   Previous use of Medtronic pump  Now on OmniPod 5/Dexcom G6    Current pump settings: Basal 0.7, CR 13, ISF 53, Target 110, correct above 110, AIT 2:30 hours      TDD 25.4    Has not worn insulin pump since 11/15/2024.  Patient has been giving herself mealtime injections of Humalog.  She does not have Lantus.  She states she has been unable to  insulin from the pharmacy as she is overdue for a and office visit and it was not paid for.    Does like the OmniPod and would like to stay on it, but needs injection orders for the meantime      Lab Results   Component Value Date/Time    LABA1C 11.7 06/16/2024 10:13 PM    LABA1C 10.6 06/05/2024 03:02 PM    LABA1C 15.0 02/06/2024 12:22 PM    LABA1C 14.2 08/20/2023 12:15 PM     Infrequent hypoglycemia + Awareness  Her diabetes complicated with gastroparesis   I reviewed current medications and the patient has no issues with diabetes medications  Diana Mann is due for eye exam   The patient performs her own feet care and doesn't see podiatry service   Microvascular complications: + Neuropathy. No Retinopathy, No Nephropathy   Macrovascular complications: no CAD, PVD, or Stroke  The patient receives Flushot every year. She has not received the pneumonia vaccine.    PAST MEDICAL HISTORY   Past Medical History:

## 2024-11-20 ENCOUNTER — APPOINTMENT (OUTPATIENT)
Dept: GENERAL RADIOLOGY | Age: 29
End: 2024-11-20
Payer: COMMERCIAL

## 2024-11-20 ENCOUNTER — HOSPITAL ENCOUNTER (INPATIENT)
Age: 29
LOS: 2 days | Discharge: HOME | End: 2024-11-22
Attending: EMERGENCY MEDICINE | Admitting: HOSPITALIST
Payer: COMMERCIAL

## 2024-11-20 DIAGNOSIS — E10.10 TYPE 1 DIABETES MELLITUS WITH KETOACIDOSIS WITHOUT COMA (HCC): Primary | ICD-10-CM

## 2024-11-20 LAB
AMPHET UR QL SCN: NEGATIVE
ANION GAP SERPL CALCULATED.3IONS-SCNC: 22 MMOL/L (ref 7–16)
B-OH-BUTYR SERPL-MCNC: 6.87 MMOL/L (ref 0.02–0.27)
BACTERIA URNS QL MICRO: ABNORMAL
BARBITURATES UR QL SCN: NEGATIVE
BASOPHILS # BLD: 0.04 K/UL (ref 0–0.2)
BASOPHILS NFR BLD: 0 % (ref 0–2)
BENZODIAZ UR QL: NEGATIVE
BILIRUB UR QL STRIP: ABNORMAL
BUN SERPL-MCNC: 24 MG/DL (ref 6–20)
BUPRENORPHINE UR QL: NEGATIVE
CALCIUM SERPL-MCNC: 9.9 MG/DL (ref 8.6–10.2)
CANNABINOIDS UR QL SCN: POSITIVE
CHLORIDE SERPL-SCNC: 95 MMOL/L (ref 98–107)
CHP ED QC CHECK: NORMAL
CLARITY UR: CLEAR
CO2 SERPL-SCNC: 15 MMOL/L (ref 22–29)
COCAINE UR QL SCN: NEGATIVE
COLOR UR: YELLOW
CREAT SERPL-MCNC: 1.1 MG/DL (ref 0.5–1)
D-DIMER QUANTITATIVE: <200 NG/ML DDU (ref 0–230)
EOSINOPHIL # BLD: 0.02 K/UL (ref 0.05–0.5)
EOSINOPHILS RELATIVE PERCENT: 0 % (ref 0–6)
ERYTHROCYTE [DISTWIDTH] IN BLOOD BY AUTOMATED COUNT: 13.2 % (ref 11.5–15)
FENTANYL UR QL: NEGATIVE
GFR, ESTIMATED: 70 ML/MIN/1.73M2
GLUCOSE BLD-MCNC: 195 MG/DL
GLUCOSE BLD-MCNC: 195 MG/DL (ref 74–99)
GLUCOSE SERPL-MCNC: 202 MG/DL (ref 74–99)
GLUCOSE UR STRIP-MCNC: 250 MG/DL
HCG SERPL QL: NEGATIVE
HCG, URINE, POC: NEGATIVE
HCT VFR BLD AUTO: 35.5 % (ref 34–48)
HGB BLD-MCNC: 12.5 G/DL (ref 11.5–15.5)
HGB UR QL STRIP.AUTO: ABNORMAL
IMM GRANULOCYTES # BLD AUTO: 0.06 K/UL (ref 0–0.58)
IMM GRANULOCYTES NFR BLD: 1 % (ref 0–5)
KETONES UR STRIP-MCNC: >80 MG/DL
LACTATE BLDV-SCNC: 2.1 MMOL/L (ref 0.5–2.2)
LEUKOCYTE ESTERASE UR QL STRIP: ABNORMAL
LIPASE SERPL-CCNC: 7 U/L (ref 13–60)
LYMPHOCYTES NFR BLD: 2.24 K/UL (ref 1.5–4)
LYMPHOCYTES RELATIVE PERCENT: 20 % (ref 20–42)
Lab: NORMAL
MAGNESIUM SERPL-MCNC: 2.3 MG/DL (ref 1.6–2.6)
MCH RBC QN AUTO: 28.1 PG (ref 26–35)
MCHC RBC AUTO-ENTMCNC: 35.2 G/DL (ref 32–34.5)
MCV RBC AUTO: 79.8 FL (ref 80–99.9)
METHADONE UR QL: NEGATIVE
MONOCYTES NFR BLD: 0.44 K/UL (ref 0.1–0.95)
MONOCYTES NFR BLD: 4 % (ref 2–12)
NEGATIVE QC PASS/FAIL: NORMAL
NEUTROPHILS NFR BLD: 76 % (ref 43–80)
NEUTS SEG NFR BLD: 8.65 K/UL (ref 1.8–7.3)
NITRITE UR QL STRIP: NEGATIVE
OPIATES UR QL SCN: NEGATIVE
OXYCODONE UR QL SCN: POSITIVE
PCP UR QL SCN: NEGATIVE
PH UR STRIP: 6 [PH] (ref 5–9)
PH VENOUS: 7.41 (ref 7.35–7.45)
PLATELET # BLD AUTO: 386 K/UL (ref 130–450)
PMV BLD AUTO: 10.3 FL (ref 7–12)
POSITIVE QC PASS/FAIL: NORMAL
POTASSIUM SERPL-SCNC: 4.5 MMOL/L (ref 3.5–5)
PROT UR STRIP-MCNC: >=300 MG/DL
RBC # BLD AUTO: 4.45 M/UL (ref 3.5–5.5)
RBC #/AREA URNS HPF: ABNORMAL /HPF
SARS-COV-2 RDRP RESP QL NAA+PROBE: NOT DETECTED
SODIUM SERPL-SCNC: 132 MMOL/L (ref 132–146)
SP GR UR STRIP: >1.03 (ref 1–1.03)
SPECIMEN DESCRIPTION: NORMAL
TEST INFORMATION: ABNORMAL
TRICHOMONAS #/AREA URNS HPF: PRESENT /[HPF]
TROPONIN I SERPL HS-MCNC: 9 NG/L (ref 0–9)
TSH SERPL DL<=0.05 MIU/L-ACNC: 0.7 UIU/ML (ref 0.27–4.2)
UROBILINOGEN UR STRIP-ACNC: 0.2 EU/DL (ref 0–1)
WBC #/AREA URNS HPF: ABNORMAL /HPF
WBC OTHER # BLD: 11.5 K/UL (ref 4.5–11.5)

## 2024-11-20 PROCEDURE — 83735 ASSAY OF MAGNESIUM: CPT

## 2024-11-20 PROCEDURE — 2500000003 HC RX 250 WO HCPCS: Performed by: EMERGENCY MEDICINE

## 2024-11-20 PROCEDURE — 80048 BASIC METABOLIC PNL TOTAL CA: CPT

## 2024-11-20 PROCEDURE — 87591 N.GONORRHOEAE DNA AMP PROB: CPT

## 2024-11-20 PROCEDURE — 84703 CHORIONIC GONADOTROPIN ASSAY: CPT

## 2024-11-20 PROCEDURE — 80307 DRUG TEST PRSMV CHEM ANLYZR: CPT

## 2024-11-20 PROCEDURE — 82947 ASSAY GLUCOSE BLOOD QUANT: CPT

## 2024-11-20 PROCEDURE — 6360000002 HC RX W HCPCS: Performed by: EMERGENCY MEDICINE

## 2024-11-20 PROCEDURE — 87086 URINE CULTURE/COLONY COUNT: CPT

## 2024-11-20 PROCEDURE — 84443 ASSAY THYROID STIM HORMONE: CPT

## 2024-11-20 PROCEDURE — 87077 CULTURE AEROBIC IDENTIFY: CPT

## 2024-11-20 PROCEDURE — 2000000000 HC ICU R&B

## 2024-11-20 PROCEDURE — 81001 URINALYSIS AUTO W/SCOPE: CPT

## 2024-11-20 PROCEDURE — 87635 SARS-COV-2 COVID-19 AMP PRB: CPT

## 2024-11-20 PROCEDURE — 71045 X-RAY EXAM CHEST 1 VIEW: CPT

## 2024-11-20 PROCEDURE — 84484 ASSAY OF TROPONIN QUANT: CPT

## 2024-11-20 PROCEDURE — 96374 THER/PROPH/DIAG INJ IV PUSH: CPT

## 2024-11-20 PROCEDURE — 96375 TX/PRO/DX INJ NEW DRUG ADDON: CPT

## 2024-11-20 PROCEDURE — 82800 BLOOD PH: CPT

## 2024-11-20 PROCEDURE — 83690 ASSAY OF LIPASE: CPT

## 2024-11-20 PROCEDURE — 82010 KETONE BODYS QUAN: CPT

## 2024-11-20 PROCEDURE — 83880 ASSAY OF NATRIURETIC PEPTIDE: CPT

## 2024-11-20 PROCEDURE — 87491 CHLMYD TRACH DNA AMP PROBE: CPT

## 2024-11-20 PROCEDURE — 85025 COMPLETE CBC W/AUTO DIFF WBC: CPT

## 2024-11-20 PROCEDURE — 99285 EMERGENCY DEPT VISIT HI MDM: CPT

## 2024-11-20 PROCEDURE — 85379 FIBRIN DEGRADATION QUANT: CPT

## 2024-11-20 PROCEDURE — 2580000003 HC RX 258: Performed by: EMERGENCY MEDICINE

## 2024-11-20 PROCEDURE — 93005 ELECTROCARDIOGRAM TRACING: CPT | Performed by: EMERGENCY MEDICINE

## 2024-11-20 PROCEDURE — 83605 ASSAY OF LACTIC ACID: CPT

## 2024-11-20 PROCEDURE — 83036 HEMOGLOBIN GLYCOSYLATED A1C: CPT

## 2024-11-20 RX ORDER — MORPHINE SULFATE 4 MG/ML
4 INJECTION, SOLUTION INTRAMUSCULAR; INTRAVENOUS ONCE
Status: COMPLETED | OUTPATIENT
Start: 2024-11-20 | End: 2024-11-20

## 2024-11-20 RX ORDER — ONDANSETRON 2 MG/ML
4 INJECTION INTRAMUSCULAR; INTRAVENOUS ONCE
Status: COMPLETED | OUTPATIENT
Start: 2024-11-20 | End: 2024-11-20

## 2024-11-20 RX ORDER — DEXTROSE MONOHYDRATE AND SODIUM CHLORIDE 5; .45 G/100ML; G/100ML
INJECTION, SOLUTION INTRAVENOUS CONTINUOUS PRN
Status: DISCONTINUED | OUTPATIENT
Start: 2024-11-20 | End: 2024-11-21

## 2024-11-20 RX ORDER — SODIUM CHLORIDE 9 MG/ML
INJECTION, SOLUTION INTRAVENOUS CONTINUOUS
Status: DISCONTINUED | OUTPATIENT
Start: 2024-11-21 | End: 2024-11-21

## 2024-11-20 RX ORDER — POTASSIUM CHLORIDE 7.45 MG/ML
10 INJECTION INTRAVENOUS PRN
Status: DISCONTINUED | OUTPATIENT
Start: 2024-11-20 | End: 2024-11-21 | Stop reason: RX

## 2024-11-20 RX ORDER — MAGNESIUM SULFATE IN WATER 40 MG/ML
2000 INJECTION, SOLUTION INTRAVENOUS PRN
Status: DISCONTINUED | OUTPATIENT
Start: 2024-11-20 | End: 2024-11-21

## 2024-11-20 RX ORDER — 0.9 % SODIUM CHLORIDE 0.9 %
15 INTRAVENOUS SOLUTION INTRAVENOUS ONCE
Status: COMPLETED | OUTPATIENT
Start: 2024-11-20 | End: 2024-11-21

## 2024-11-20 RX ORDER — 0.9 % SODIUM CHLORIDE 0.9 %
30 INTRAVENOUS SOLUTION INTRAVENOUS ONCE
Status: COMPLETED | OUTPATIENT
Start: 2024-11-20 | End: 2024-11-20

## 2024-11-20 RX ADMIN — ONDANSETRON 4 MG: 2 INJECTION INTRAMUSCULAR; INTRAVENOUS at 22:41

## 2024-11-20 RX ADMIN — FAMOTIDINE 20 MG: 10 INJECTION, SOLUTION INTRAVENOUS at 22:40

## 2024-11-20 RX ADMIN — SODIUM CHLORIDE 1605 ML: 9 INJECTION, SOLUTION INTRAVENOUS at 22:40

## 2024-11-20 RX ADMIN — MORPHINE SULFATE 4 MG: 4 INJECTION, SOLUTION INTRAMUSCULAR; INTRAVENOUS at 22:40

## 2024-11-20 ASSESSMENT — ENCOUNTER SYMPTOMS
NAUSEA: 1
SHORTNESS OF BREATH: 1
VOMITING: 1
ABDOMINAL PAIN: 1

## 2024-11-20 ASSESSMENT — PAIN DESCRIPTION - ORIENTATION: ORIENTATION: MID

## 2024-11-20 ASSESSMENT — PAIN DESCRIPTION - PAIN TYPE: TYPE: ACUTE PAIN

## 2024-11-20 ASSESSMENT — PAIN DESCRIPTION - LOCATION: LOCATION: ABDOMEN

## 2024-11-20 ASSESSMENT — PAIN - FUNCTIONAL ASSESSMENT
PAIN_FUNCTIONAL_ASSESSMENT: ACTIVITIES ARE NOT PREVENTED
PAIN_FUNCTIONAL_ASSESSMENT: 0-10

## 2024-11-20 ASSESSMENT — PAIN SCALES - GENERAL: PAINLEVEL_OUTOF10: 10

## 2024-11-21 ENCOUNTER — APPOINTMENT (OUTPATIENT)
Dept: ULTRASOUND IMAGING | Age: 29
End: 2024-11-21
Payer: COMMERCIAL

## 2024-11-21 PROBLEM — E10.10 DKA, TYPE 1, NOT AT GOAL (HCC): Chronic | Status: ACTIVE | Noted: 2024-06-17

## 2024-11-21 LAB
ALBUMIN SERPL-MCNC: 3.5 G/DL (ref 3.5–5.2)
ALP SERPL-CCNC: 83 U/L (ref 35–104)
ALT SERPL-CCNC: 20 U/L (ref 0–32)
ANION GAP SERPL CALCULATED.3IONS-SCNC: 13 MMOL/L (ref 7–16)
ANION GAP SERPL CALCULATED.3IONS-SCNC: 14 MMOL/L (ref 7–16)
AST SERPL-CCNC: 27 U/L (ref 0–31)
BASOPHILS # BLD: 0.03 K/UL (ref 0–0.2)
BASOPHILS NFR BLD: 0 % (ref 0–2)
BILIRUB DIRECT SERPL-MCNC: <0.2 MG/DL (ref 0–0.3)
BILIRUB INDIRECT SERPL-MCNC: NORMAL MG/DL (ref 0–1)
BILIRUB SERPL-MCNC: <0.2 MG/DL (ref 0–1.2)
BNP SERPL-MCNC: <36 PG/ML (ref 0–125)
BUN SERPL-MCNC: 13 MG/DL (ref 6–20)
BUN SERPL-MCNC: 19 MG/DL (ref 6–20)
CALCIUM SERPL-MCNC: 8.2 MG/DL (ref 8.6–10.2)
CALCIUM SERPL-MCNC: 8.2 MG/DL (ref 8.6–10.2)
CHLORIDE SERPL-SCNC: 103 MMOL/L (ref 98–107)
CHLORIDE SERPL-SCNC: 107 MMOL/L (ref 98–107)
CO2 SERPL-SCNC: 15 MMOL/L (ref 22–29)
CO2 SERPL-SCNC: 19 MMOL/L (ref 22–29)
CREAT SERPL-MCNC: 0.8 MG/DL (ref 0.5–1)
CREAT SERPL-MCNC: 0.9 MG/DL (ref 0.5–1)
CRP SERPL HS-MCNC: <3 MG/L (ref 0–5)
EKG ATRIAL RATE: 101 BPM
EKG P AXIS: 74 DEGREES
EKG P-R INTERVAL: 138 MS
EKG Q-T INTERVAL: 340 MS
EKG QRS DURATION: 64 MS
EKG QTC CALCULATION (BAZETT): 440 MS
EKG R AXIS: 73 DEGREES
EKG T AXIS: 60 DEGREES
EKG VENTRICULAR RATE: 101 BPM
EOSINOPHIL # BLD: 0 K/UL (ref 0.05–0.5)
EOSINOPHILS RELATIVE PERCENT: 0 % (ref 0–6)
ERYTHROCYTE [DISTWIDTH] IN BLOOD BY AUTOMATED COUNT: 13.5 % (ref 11.5–15)
GFR, ESTIMATED: >90 ML/MIN/1.73M2
GFR, ESTIMATED: >90 ML/MIN/1.73M2
GLUCOSE BLD-MCNC: 119 MG/DL (ref 74–99)
GLUCOSE BLD-MCNC: 142 MG/DL (ref 74–99)
GLUCOSE BLD-MCNC: 150 MG/DL (ref 74–99)
GLUCOSE BLD-MCNC: 174 MG/DL (ref 74–99)
GLUCOSE BLD-MCNC: 182 MG/DL (ref 74–99)
GLUCOSE BLD-MCNC: 200 MG/DL (ref 74–99)
GLUCOSE BLD-MCNC: 208 MG/DL (ref 74–99)
GLUCOSE BLD-MCNC: 224 MG/DL (ref 74–99)
GLUCOSE BLD-MCNC: 76 MG/DL (ref 74–99)
GLUCOSE BLD-MCNC: 79 MG/DL (ref 74–99)
GLUCOSE BLD-MCNC: 90 MG/DL (ref 74–99)
GLUCOSE BLD-MCNC: 93 MG/DL (ref 74–99)
GLUCOSE SERPL-MCNC: 155 MG/DL (ref 74–99)
GLUCOSE SERPL-MCNC: 178 MG/DL (ref 74–99)
HBA1C MFR BLD: 11.9 % (ref 4–5.6)
HBA1C MFR BLD: 11.9 % (ref 4–5.6)
HCT VFR BLD AUTO: 31.8 % (ref 34–48)
HGB BLD-MCNC: 11 G/DL (ref 11.5–15.5)
IMM GRANULOCYTES # BLD AUTO: 0.05 K/UL (ref 0–0.58)
IMM GRANULOCYTES NFR BLD: 0 % (ref 0–5)
LYMPHOCYTES NFR BLD: 2.88 K/UL (ref 1.5–4)
LYMPHOCYTES RELATIVE PERCENT: 24 % (ref 20–42)
MAGNESIUM SERPL-MCNC: 1.9 MG/DL (ref 1.6–2.6)
MAGNESIUM SERPL-MCNC: 2 MG/DL (ref 1.6–2.6)
MCH RBC QN AUTO: 28.1 PG (ref 26–35)
MCHC RBC AUTO-ENTMCNC: 34.6 G/DL (ref 32–34.5)
MCV RBC AUTO: 81.1 FL (ref 80–99.9)
MONOCYTES NFR BLD: 0.51 K/UL (ref 0.1–0.95)
MONOCYTES NFR BLD: 4 % (ref 2–12)
NEUTROPHILS NFR BLD: 72 % (ref 43–80)
NEUTS SEG NFR BLD: 8.72 K/UL (ref 1.8–7.3)
PHOSPHATE SERPL-MCNC: 2.4 MG/DL (ref 2.5–4.5)
PHOSPHATE SERPL-MCNC: 3.2 MG/DL (ref 2.5–4.5)
PLATELET # BLD AUTO: 344 K/UL (ref 130–450)
PMV BLD AUTO: 10.4 FL (ref 7–12)
POTASSIUM SERPL-SCNC: 4 MMOL/L (ref 3.5–5)
POTASSIUM SERPL-SCNC: 4.4 MMOL/L (ref 3.5–5)
PROCALCITONIN SERPL-MCNC: 0.11 NG/ML (ref 0–0.08)
PROT SERPL-MCNC: 6.5 G/DL (ref 6.4–8.3)
RBC # BLD AUTO: 3.92 M/UL (ref 3.5–5.5)
SODIUM SERPL-SCNC: 135 MMOL/L (ref 132–146)
SODIUM SERPL-SCNC: 136 MMOL/L (ref 132–146)
WBC OTHER # BLD: 12.2 K/UL (ref 4.5–11.5)

## 2024-11-21 PROCEDURE — 2580000003 HC RX 258: Performed by: EMERGENCY MEDICINE

## 2024-11-21 PROCEDURE — 87081 CULTURE SCREEN ONLY: CPT

## 2024-11-21 PROCEDURE — 6360000002 HC RX W HCPCS: Performed by: NURSE PRACTITIONER

## 2024-11-21 PROCEDURE — 6370000000 HC RX 637 (ALT 250 FOR IP): Performed by: STUDENT IN AN ORGANIZED HEALTH CARE EDUCATION/TRAINING PROGRAM

## 2024-11-21 PROCEDURE — 2580000003 HC RX 258: Performed by: STUDENT IN AN ORGANIZED HEALTH CARE EDUCATION/TRAINING PROGRAM

## 2024-11-21 PROCEDURE — 06HY33Z INSERTION OF INFUSION DEVICE INTO LOWER VEIN, PERCUTANEOUS APPROACH: ICD-10-PCS | Performed by: STUDENT IN AN ORGANIZED HEALTH CARE EDUCATION/TRAINING PROGRAM

## 2024-11-21 PROCEDURE — 93010 ELECTROCARDIOGRAM REPORT: CPT | Performed by: INTERNAL MEDICINE

## 2024-11-21 PROCEDURE — 6360000002 HC RX W HCPCS: Performed by: EMERGENCY MEDICINE

## 2024-11-21 PROCEDURE — 36556 INSERT NON-TUNNEL CV CATH: CPT

## 2024-11-21 PROCEDURE — 6360000002 HC RX W HCPCS: Performed by: STUDENT IN AN ORGANIZED HEALTH CARE EDUCATION/TRAINING PROGRAM

## 2024-11-21 PROCEDURE — 1200000000 HC SEMI PRIVATE

## 2024-11-21 PROCEDURE — 82248 BILIRUBIN DIRECT: CPT

## 2024-11-21 PROCEDURE — 83735 ASSAY OF MAGNESIUM: CPT

## 2024-11-21 PROCEDURE — 86140 C-REACTIVE PROTEIN: CPT

## 2024-11-21 PROCEDURE — 76705 ECHO EXAM OF ABDOMEN: CPT

## 2024-11-21 PROCEDURE — 6370000000 HC RX 637 (ALT 250 FOR IP)

## 2024-11-21 PROCEDURE — 80048 BASIC METABOLIC PNL TOTAL CA: CPT

## 2024-11-21 PROCEDURE — 80053 COMPREHEN METABOLIC PANEL: CPT

## 2024-11-21 PROCEDURE — 84145 PROCALCITONIN (PCT): CPT

## 2024-11-21 PROCEDURE — 6360000002 HC RX W HCPCS

## 2024-11-21 PROCEDURE — 36592 COLLECT BLOOD FROM PICC: CPT

## 2024-11-21 PROCEDURE — 82947 ASSAY GLUCOSE BLOOD QUANT: CPT

## 2024-11-21 PROCEDURE — 85025 COMPLETE CBC W/AUTO DIFF WBC: CPT

## 2024-11-21 PROCEDURE — 6370000000 HC RX 637 (ALT 250 FOR IP): Performed by: EMERGENCY MEDICINE

## 2024-11-21 PROCEDURE — 84100 ASSAY OF PHOSPHORUS: CPT

## 2024-11-21 PROCEDURE — 83036 HEMOGLOBIN GLYCOSYLATED A1C: CPT

## 2024-11-21 PROCEDURE — 2500000003 HC RX 250 WO HCPCS: Performed by: EMERGENCY MEDICINE

## 2024-11-21 PROCEDURE — 99222 1ST HOSP IP/OBS MODERATE 55: CPT | Performed by: INTERNAL MEDICINE

## 2024-11-21 PROCEDURE — 99223 1ST HOSP IP/OBS HIGH 75: CPT | Performed by: HOSPITALIST

## 2024-11-21 RX ORDER — SODIUM CHLORIDE 0.9 % (FLUSH) 0.9 %
5-40 SYRINGE (ML) INJECTION 2 TIMES DAILY
Status: DISCONTINUED | OUTPATIENT
Start: 2024-11-21 | End: 2024-11-21

## 2024-11-21 RX ORDER — METOPROLOL TARTRATE 50 MG
50 TABLET ORAL 2 TIMES DAILY
Status: DISCONTINUED | OUTPATIENT
Start: 2024-11-21 | End: 2024-11-22 | Stop reason: HOSPADM

## 2024-11-21 RX ORDER — ONDANSETRON 4 MG/1
4 TABLET, ORALLY DISINTEGRATING ORAL EVERY 8 HOURS PRN
Status: DISCONTINUED | OUTPATIENT
Start: 2024-11-21 | End: 2024-11-22 | Stop reason: HOSPADM

## 2024-11-21 RX ORDER — INSULIN LISPRO 100 [IU]/ML
0-4 INJECTION, SOLUTION INTRAVENOUS; SUBCUTANEOUS
Status: DISCONTINUED | OUTPATIENT
Start: 2024-11-21 | End: 2024-11-22 | Stop reason: HOSPADM

## 2024-11-21 RX ORDER — INSULIN GLARGINE 100 [IU]/ML
16 INJECTION, SOLUTION SUBCUTANEOUS ONCE
Status: COMPLETED | OUTPATIENT
Start: 2024-11-21 | End: 2024-11-21

## 2024-11-21 RX ORDER — SODIUM CHLORIDE 0.9 % (FLUSH) 0.9 %
5-40 SYRINGE (ML) INJECTION EVERY 12 HOURS SCHEDULED
Status: DISCONTINUED | OUTPATIENT
Start: 2024-11-21 | End: 2024-11-22 | Stop reason: HOSPADM

## 2024-11-21 RX ORDER — MAGNESIUM SULFATE IN WATER 40 MG/ML
2000 INJECTION, SOLUTION INTRAVENOUS PRN
Status: CANCELLED | OUTPATIENT
Start: 2024-11-21

## 2024-11-21 RX ORDER — SODIUM CHLORIDE 9 MG/ML
INJECTION, SOLUTION INTRAVENOUS CONTINUOUS
Status: CANCELLED | OUTPATIENT
Start: 2024-11-21

## 2024-11-21 RX ORDER — HEPARIN 100 UNIT/ML
300 SYRINGE INTRAVENOUS 2 TIMES DAILY
Status: DISCONTINUED | OUTPATIENT
Start: 2024-11-21 | End: 2024-11-22 | Stop reason: HOSPADM

## 2024-11-21 RX ORDER — POTASSIUM CHLORIDE 29.8 MG/ML
20 INJECTION INTRAVENOUS ONCE
Status: COMPLETED | OUTPATIENT
Start: 2024-11-21 | End: 2024-11-21

## 2024-11-21 RX ORDER — FENTANYL CITRATE 50 UG/ML
25 INJECTION, SOLUTION INTRAMUSCULAR; INTRAVENOUS ONCE
Status: COMPLETED | OUTPATIENT
Start: 2024-11-21 | End: 2024-11-21

## 2024-11-21 RX ORDER — INSULIN LISPRO 100 [IU]/ML
5 INJECTION, SOLUTION INTRAVENOUS; SUBCUTANEOUS
Status: DISCONTINUED | OUTPATIENT
Start: 2024-11-21 | End: 2024-11-21

## 2024-11-21 RX ORDER — ONDANSETRON 2 MG/ML
4 INJECTION INTRAMUSCULAR; INTRAVENOUS EVERY 6 HOURS PRN
Status: DISCONTINUED | OUTPATIENT
Start: 2024-11-21 | End: 2024-11-22 | Stop reason: HOSPADM

## 2024-11-21 RX ORDER — MORPHINE SULFATE 2 MG/ML
2 INJECTION, SOLUTION INTRAMUSCULAR; INTRAVENOUS ONCE
Status: COMPLETED | OUTPATIENT
Start: 2024-11-21 | End: 2024-11-21

## 2024-11-21 RX ORDER — KETOROLAC TROMETHAMINE 15 MG/ML
15 INJECTION, SOLUTION INTRAMUSCULAR; INTRAVENOUS ONCE
Status: COMPLETED | OUTPATIENT
Start: 2024-11-21 | End: 2024-11-21

## 2024-11-21 RX ORDER — DOXYCYCLINE 100 MG/1
100 CAPSULE ORAL EVERY 12 HOURS SCHEDULED
Status: DISCONTINUED | OUTPATIENT
Start: 2024-11-21 | End: 2024-11-21

## 2024-11-21 RX ORDER — DEXTROSE MONOHYDRATE, SODIUM CHLORIDE, AND POTASSIUM CHLORIDE 50; 1.49; 4.5 G/1000ML; G/1000ML; G/1000ML
INJECTION, SOLUTION INTRAVENOUS CONTINUOUS PRN
Status: CANCELLED | OUTPATIENT
Start: 2024-11-21

## 2024-11-21 RX ORDER — ZOLPIDEM TARTRATE 5 MG/1
10 TABLET ORAL NIGHTLY PRN
Status: DISCONTINUED | OUTPATIENT
Start: 2024-11-21 | End: 2024-11-22 | Stop reason: HOSPADM

## 2024-11-21 RX ORDER — MORPHINE SULFATE 2 MG/ML
2 INJECTION, SOLUTION INTRAMUSCULAR; INTRAVENOUS
Status: COMPLETED | OUTPATIENT
Start: 2024-11-21 | End: 2024-11-21

## 2024-11-21 RX ORDER — 0.9 % SODIUM CHLORIDE 0.9 %
15 INTRAVENOUS SOLUTION INTRAVENOUS ONCE
Status: CANCELLED | OUTPATIENT
Start: 2024-11-21

## 2024-11-21 RX ORDER — INSULIN GLARGINE 100 [IU]/ML
16 INJECTION, SOLUTION SUBCUTANEOUS NIGHTLY
Status: DISCONTINUED | OUTPATIENT
Start: 2024-11-22 | End: 2024-11-21

## 2024-11-21 RX ORDER — POLYETHYLENE GLYCOL 3350 17 G/17G
17 POWDER, FOR SOLUTION ORAL DAILY PRN
Status: DISCONTINUED | OUTPATIENT
Start: 2024-11-21 | End: 2024-11-22 | Stop reason: HOSPADM

## 2024-11-21 RX ORDER — AMLODIPINE BESYLATE 5 MG/1
5 TABLET ORAL DAILY
Status: DISCONTINUED | OUTPATIENT
Start: 2024-11-21 | End: 2024-11-22 | Stop reason: HOSPADM

## 2024-11-21 RX ORDER — INSULIN LISPRO 100 [IU]/ML
4 INJECTION, SOLUTION INTRAVENOUS; SUBCUTANEOUS
Status: DISCONTINUED | OUTPATIENT
Start: 2024-11-22 | End: 2024-11-22 | Stop reason: HOSPADM

## 2024-11-21 RX ORDER — SODIUM CHLORIDE 0.9 % (FLUSH) 0.9 %
5-40 SYRINGE (ML) INJECTION PRN
Status: DISCONTINUED | OUTPATIENT
Start: 2024-11-21 | End: 2024-11-22 | Stop reason: HOSPADM

## 2024-11-21 RX ORDER — POTASSIUM CHLORIDE 7.45 MG/ML
10 INJECTION INTRAVENOUS PRN
Status: CANCELLED | OUTPATIENT
Start: 2024-11-21 | End: 2024-11-26

## 2024-11-21 RX ORDER — DICYCLOMINE HYDROCHLORIDE 10 MG/1
10 CAPSULE ORAL
Status: DISCONTINUED | OUTPATIENT
Start: 2024-11-21 | End: 2024-11-22 | Stop reason: HOSPADM

## 2024-11-21 RX ORDER — INSULIN GLARGINE 100 [IU]/ML
12 INJECTION, SOLUTION SUBCUTANEOUS EVERY MORNING
Status: DISCONTINUED | OUTPATIENT
Start: 2024-11-22 | End: 2024-11-22 | Stop reason: HOSPADM

## 2024-11-21 RX ORDER — ONDANSETRON 2 MG/ML
4 INJECTION INTRAMUSCULAR; INTRAVENOUS ONCE
Status: COMPLETED | OUTPATIENT
Start: 2024-11-21 | End: 2024-11-21

## 2024-11-21 RX ORDER — SODIUM CHLORIDE 9 MG/ML
INJECTION, SOLUTION INTRAVENOUS PRN
Status: DISCONTINUED | OUTPATIENT
Start: 2024-11-21 | End: 2024-11-22 | Stop reason: HOSPADM

## 2024-11-21 RX ORDER — ACETAMINOPHEN 325 MG/1
650 TABLET ORAL EVERY 6 HOURS PRN
Status: DISCONTINUED | OUTPATIENT
Start: 2024-11-21 | End: 2024-11-22 | Stop reason: HOSPADM

## 2024-11-21 RX ORDER — ENOXAPARIN SODIUM 100 MG/ML
40 INJECTION SUBCUTANEOUS DAILY
Status: CANCELLED | OUTPATIENT
Start: 2024-11-21

## 2024-11-21 RX ORDER — POTASSIUM CHLORIDE 29.8 MG/ML
20 INJECTION INTRAVENOUS PRN
Status: DISCONTINUED | OUTPATIENT
Start: 2024-11-21 | End: 2024-11-21

## 2024-11-21 RX ORDER — POLYETHYLENE GLYCOL 3350 17 G/17G
17 POWDER, FOR SOLUTION ORAL DAILY PRN
Status: CANCELLED | OUTPATIENT
Start: 2024-11-21

## 2024-11-21 RX ORDER — ENOXAPARIN SODIUM 100 MG/ML
40 INJECTION SUBCUTANEOUS DAILY
Status: DISCONTINUED | OUTPATIENT
Start: 2024-11-21 | End: 2024-11-22 | Stop reason: HOSPADM

## 2024-11-21 RX ORDER — METRONIDAZOLE 500 MG/1
500 TABLET ORAL EVERY 12 HOURS SCHEDULED
Status: DISCONTINUED | OUTPATIENT
Start: 2024-11-21 | End: 2024-11-22 | Stop reason: HOSPADM

## 2024-11-21 RX ORDER — ACETAMINOPHEN 650 MG/1
650 SUPPOSITORY RECTAL EVERY 6 HOURS PRN
Status: DISCONTINUED | OUTPATIENT
Start: 2024-11-21 | End: 2024-11-22 | Stop reason: HOSPADM

## 2024-11-21 RX ADMIN — POTASSIUM CHLORIDE 20 MEQ: 29.8 INJECTION, SOLUTION INTRAVENOUS at 06:01

## 2024-11-21 RX ADMIN — HEPARIN 300 UNITS: 100 SYRINGE at 20:11

## 2024-11-21 RX ADMIN — SODIUM CHLORIDE 1.4 UNITS/HR: 9 INJECTION, SOLUTION INTRAVENOUS at 01:55

## 2024-11-21 RX ADMIN — ZOLPIDEM TARTRATE 10 MG: 5 TABLET ORAL at 23:33

## 2024-11-21 RX ADMIN — POTASSIUM CHLORIDE 10 MEQ: 7.46 INJECTION, SOLUTION INTRAVENOUS at 04:20

## 2024-11-21 RX ADMIN — MORPHINE SULFATE 2 MG: 2 INJECTION, SOLUTION INTRAMUSCULAR; INTRAVENOUS at 05:16

## 2024-11-21 RX ADMIN — FENTANYL CITRATE 25 MCG: 50 INJECTION INTRAMUSCULAR; INTRAVENOUS at 03:38

## 2024-11-21 RX ADMIN — METRONIDAZOLE 500 MG: 500 TABLET ORAL at 08:30

## 2024-11-21 RX ADMIN — METRONIDAZOLE 500 MG: 500 TABLET ORAL at 20:01

## 2024-11-21 RX ADMIN — SODIUM PHOSPHATE, MONOBASIC, MONOHYDRATE AND SODIUM PHOSPHATE, DIBASIC, ANHYDROUS 15 MMOL: 142; 276 INJECTION, SOLUTION INTRAVENOUS at 11:11

## 2024-11-21 RX ADMIN — CEFTRIAXONE 1000 MG: 1 INJECTION, POWDER, FOR SOLUTION INTRAMUSCULAR; INTRAVENOUS at 23:37

## 2024-11-21 RX ADMIN — SODIUM CHLORIDE: 9 INJECTION, SOLUTION INTRAVENOUS at 03:38

## 2024-11-21 RX ADMIN — METOPROLOL TARTRATE 50 MG: 50 TABLET, FILM COATED ORAL at 20:01

## 2024-11-21 RX ADMIN — DEXTROSE AND SODIUM CHLORIDE: 5; 450 INJECTION, SOLUTION INTRAVENOUS at 05:10

## 2024-11-21 RX ADMIN — SODIUM CHLORIDE 803 ML: 9 INJECTION, SOLUTION INTRAVENOUS at 01:15

## 2024-11-21 RX ADMIN — ONDANSETRON 4 MG: 2 INJECTION INTRAMUSCULAR; INTRAVENOUS at 05:12

## 2024-11-21 RX ADMIN — MORPHINE SULFATE 2 MG: 2 INJECTION, SOLUTION INTRAMUSCULAR; INTRAVENOUS at 10:27

## 2024-11-21 RX ADMIN — SODIUM CHLORIDE, PRESERVATIVE FREE 10 ML: 5 INJECTION INTRAVENOUS at 20:09

## 2024-11-21 RX ADMIN — INSULIN GLARGINE 16 UNITS: 100 INJECTION, SOLUTION SUBCUTANEOUS at 10:09

## 2024-11-21 RX ADMIN — DOXYCYCLINE HYCLATE 100 MG: 100 CAPSULE ORAL at 08:30

## 2024-11-21 RX ADMIN — DICYCLOMINE HYDROCHLORIDE 10 MG: 10 CAPSULE ORAL at 06:01

## 2024-11-21 RX ADMIN — ONDANSETRON 4 MG: 2 INJECTION INTRAMUSCULAR; INTRAVENOUS at 13:34

## 2024-11-21 RX ADMIN — KETOROLAC TROMETHAMINE 15 MG: 15 INJECTION, SOLUTION INTRAMUSCULAR; INTRAVENOUS at 23:33

## 2024-11-21 RX ADMIN — ONDANSETRON 4 MG: 2 INJECTION INTRAMUSCULAR; INTRAVENOUS at 01:09

## 2024-11-21 RX ADMIN — MORPHINE SULFATE 2 MG: 2 INJECTION, SOLUTION INTRAMUSCULAR; INTRAVENOUS at 13:33

## 2024-11-21 RX ADMIN — MORPHINE SULFATE 2 MG: 2 INJECTION, SOLUTION INTRAMUSCULAR; INTRAVENOUS at 07:30

## 2024-11-21 RX ADMIN — METOPROLOL TARTRATE 50 MG: 50 TABLET, FILM COATED ORAL at 08:01

## 2024-11-21 RX ADMIN — MORPHINE SULFATE 2 MG: 2 INJECTION, SOLUTION INTRAMUSCULAR; INTRAVENOUS at 01:09

## 2024-11-21 RX ADMIN — WATER 1000 MG: 1 INJECTION INTRAMUSCULAR; INTRAVENOUS; SUBCUTANEOUS at 01:09

## 2024-11-21 ASSESSMENT — PAIN DESCRIPTION - LOCATION
LOCATION: BACK
LOCATION: ABDOMEN
LOCATION: ABDOMEN;BACK
LOCATION: BACK
LOCATION: BACK
LOCATION: ABDOMEN;BACK

## 2024-11-21 ASSESSMENT — PAIN SCALES - GENERAL
PAINLEVEL_OUTOF10: 7
PAINLEVEL_OUTOF10: 0
PAINLEVEL_OUTOF10: 10
PAINLEVEL_OUTOF10: 6
PAINLEVEL_OUTOF10: 8
PAINLEVEL_OUTOF10: 2
PAINLEVEL_OUTOF10: 0
PAINLEVEL_OUTOF10: 0
PAINLEVEL_OUTOF10: 10
PAINLEVEL_OUTOF10: 10
PAINLEVEL_OUTOF10: 8
PAINLEVEL_OUTOF10: 10
PAINLEVEL_OUTOF10: 9

## 2024-11-21 ASSESSMENT — PAIN - FUNCTIONAL ASSESSMENT
PAIN_FUNCTIONAL_ASSESSMENT: PREVENTS OR INTERFERES SOME ACTIVE ACTIVITIES AND ADLS

## 2024-11-21 ASSESSMENT — PAIN DESCRIPTION - ORIENTATION
ORIENTATION: RIGHT;LEFT
ORIENTATION: RIGHT;LEFT
ORIENTATION: RIGHT;LEFT;LOWER
ORIENTATION: RIGHT;LEFT

## 2024-11-21 ASSESSMENT — PAIN DESCRIPTION - DESCRIPTORS
DESCRIPTORS: ACHING;DISCOMFORT;DULL
DESCRIPTORS: ACHING;DISCOMFORT
DESCRIPTORS: ACHING;DISCOMFORT;DULL
DESCRIPTORS: ACHING;DISCOMFORT;DULL
DESCRIPTORS: ACHING;DISCOMFORT
DESCRIPTORS: ACHING;DISCOMFORT;DULL

## 2024-11-21 ASSESSMENT — PAIN DESCRIPTION - ONSET
ONSET: ON-GOING

## 2024-11-21 ASSESSMENT — PAIN DESCRIPTION - FREQUENCY
FREQUENCY: CONTINUOUS

## 2024-11-21 ASSESSMENT — PAIN DESCRIPTION - PAIN TYPE: TYPE: CHRONIC PAIN

## 2024-11-21 NOTE — PROGRESS NOTES
Patient admitted from ER to 207, with the following belongings; wallet, money, keys, phone, , earrings, rings, clothing, shoes, placed on monitor, patient oriented to room and unit visiting hours.  Patient guide at bedside, reviewed patient rights and responsibilities. MRSA nasal swab obtained.  Bed alarm on.  Call light within reach.

## 2024-11-21 NOTE — ED TRIAGE NOTES
Department of Emergency Medicine  FIRST PROVIDER TRIAGE NOTE             Independent MLP           11/20/24  7:39 PM EST    Date of Encounter: 11/20/24   MRN: [id number]      HPI: Diana Mann is a 29 y.o. female who presents to the ED for No chief complaint on file.     ROS: Negative for cp, Suicidal ideation, or Homicidal Ideation.    PE: Gen Appearance/Constitutional: alert  CV: tachycardia     Initial Plan of Care: All treatment areas with department are currently occupied. Plan to order/Initiate the following while awaiting opening in ED: POC pregnancy urine, urinalysis.  Initiate Treatment-Testing, Proceed toTreatment Area When Bed Available for ED Attending/MLP to Continue Care    Electronically signed by MIKE Peck CNP   DD: 11/20/24

## 2024-11-21 NOTE — CONSULTS
Critical Care Admit/Consult Note         Patient - Diana Mann   MRN -  [id number]   [id number]   - 1995      Date of Admission -  2024  8:51 PM  Date of evaluation -  2024   Hospital Day - 1            ADMIT/CONSULT DETAILS     Reason for Admit/Consult   Vomiting, shortness of breath, back pain, and abdominal pain     Consulting Service/Physician   Consulting - Ignacio Shah DO  Primary Care Physician - Opal Iyer MD HPI   The patient is a 29 y.o. female with significant past medical history of Diabetes type 1, depression, DKA requiring frequent admissions, non-compliance, and primary hypertension. She presented to the ED with nausea, vomiting, mid abdominal and back pain, and lightheadedness x2 days. She states that there was an insurance/ pharmacy issue and she had not received her insulin for 48 hours but she was able to take her insulin today. She uses an OmniPod 5 and Dexcom G6 but has not worn the pump since 11/15/2024 due to inability to  insulin due to financial issues. She said when she is discharged from here she will have her supplies for the pump. Her last visit with endocrinology was  where she was prescribed 16 units of Lantus nightly and 1 unit of humalog for every 13 g of carbohydrates plus low- dose sliding scale until she is back on the pump.     In the ED, she was given rocephin, pepcid, zofran, morphine, 1,605ml NS and started on an insulin drip. Labs remarkable for anion gap 22, glucose 202, beta- hydroxybutyrate 6.87, urine positive for glucose, >80 ketones, small leukocyte esterase, WBC 10 to 20, and trichomonas. A central line was placed due to poor vascular access. She will be admitted to the ICU on the DKA protocol. Chest xray was negative.     Past Medical History         Diagnosis Date    Bleeding at insertion site 2018    Common femoral artery injury, right, initial encounter 2018    Depressive disorder    At this time patient is safe to be transferred out of the ICU    During multidisciplinary team rounds the patient was seen, examined and discussed. This is confirmation that I have personally seen and examined the patient and that the key elements of the encounter were performed by me (> 85 % time).  The medications & laboratory data and imagery was discussed and adjusted where necessary. Key issues of the case were discussed among consultants.       This patient has a high probability of sudden clinically significant deterioration. I managed/supervised life or organ supporting interventions that required frequent physician assessment. I devoted my full attention to the direct care of this patient for the period of time indicated below. In addition to above, time was devoted to teaching and to any procedure.     NOTE: This report, in part or full, may have been transcribed using voice recognition software. Every effort was made to ensure accuracy; however, inadvertent computerized transcription errors may be present. Please excuse any transcriptional grammatical or spelling errors that may have escaped my editorial review.    Total critical care time caring for this patient with life threatening, unstable organ failure, including direct patient contact, management of life support systems, review of data including imaging and labs, discussions with other team members and physicians is at least 36 min so far today, excluding procedures.    Kirk Griffith MD 4:43 PM 11/21/2024

## 2024-11-21 NOTE — PROGRESS NOTES
Patient admitted by nighttime hospitalist earlier today.  Patient was seen and examined today.  Patient was admitted for DKA.    GEN: NAD  Card: RRR  Pulm: CTAB  ABD: NTND, + BS  Ext: No edema    Agree with H&P's assessment and plan, no changes    Electronically signed by Johnnie Jacob MD on 11/21/2024 at 2:06 PM

## 2024-11-21 NOTE — ED NOTES
ED to Inpatient Handoff Report    Notified Fam that electronic handoff available and patient ready for transport to room 207.    Safety Risks: None identified    Patient in Restraints: no    Constant Observer or Patient : no    Telemetry Monitoring Ordered: Yes          Order to transfer to unit without monitor: no    Last MEWS:  Time completed:     Deterioration Index: 26.41    Vitals:    11/20/24 2243 11/20/24 2324 11/21/24 0340 11/21/24 0424   BP: (!) 159/104 (!) 132/92 (!) 136/93    Pulse: 100 99 (!) 106 (!) 106   Resp: 18 11 17    Temp:       TempSrc:       SpO2: 100% 100% 100%    Weight:       Height:           Opportunity for questions and clarification was provided.

## 2024-11-21 NOTE — PROGRESS NOTES
4 Eyes Skin Assessment     NAME:  Diana Mann  YOB: 1995  MEDICAL RECORD NUMBER:  [id number]    The patient is being assessed for  Admission    I agree that at least one RN has performed a thorough Head to Toe Skin Assessment on the patient. ALL assessment sites listed below have been assessed.      Areas assessed by both nurses:    Head, Face, Ears, Shoulders, Back, Chest, Arms, Elbows, Hands, Sacrum. Buttock, Coccyx, Ischium, Legs. Feet and Heels, and Under Medical Devices         Does the Patient have a Wound? No noted wound(s)       Gavino Prevention initiated by RN: No  Wound Care Orders initiated by RN: No    Pressure Injury (Stage 3,4, Unstageable, DTI, NWPT, and Complex wounds) if present, place Wound referral order by RN under : No    New Ostomies, if present place, Ostomy referral order under : No     Nurse 1 eSignature: Electronically signed by Janeen Rodriguez RN on 11/21/24 at 5:35 AM EST    **SHARE this note so that the co-signing nurse can place an eSignature**    Nurse 2 eSignature: Electronically signed by Marisela Gonzalez RN on 11/21/24 at 5:38 AM EST

## 2024-11-21 NOTE — PLAN OF CARE
Problem: Chronic Conditions and Co-morbidities  Goal: Patient's chronic conditions and co-morbidity symptoms are monitored and maintained or improved  Outcome: Progressing  Flowsheets (Taken 11/21/2024 0500 by Salome Bolden RN)  Care Plan - Patient's Chronic Conditions and Co-Morbidity Symptoms are Monitored and Maintained or Improved: Monitor and assess patient's chronic conditions and comorbid symptoms for stability, deterioration, or improvement     Problem: Discharge Planning  Goal: Discharge to home or other facility with appropriate resources  Outcome: Progressing  Flowsheets (Taken 11/21/2024 0500 by Salome Bolden RN)  Discharge to home or other facility with appropriate resources:   Identify barriers to discharge with patient and caregiver   Arrange for needed discharge resources and transportation as appropriate     Problem: Pain  Goal: Verbalizes/displays adequate comfort level or baseline comfort level  Outcome: Progressing  Flowsheets (Taken 11/21/2024 0439 by Salome Bolden RN)  Verbalizes/displays adequate comfort level or baseline comfort level:   Encourage patient to monitor pain and request assistance   Assess pain using appropriate pain scale     Problem: ABCDS Injury Assessment  Goal: Absence of physical injury  Outcome: Progressing     Problem: Safety - Adult  Goal: Free from fall injury  Outcome: Progressing

## 2024-11-21 NOTE — ED NOTES
PROCEDURE  11/21/24       Time: 0343    CENTRAL LINE INSERTION  Risks, benefits and alternatives (for applicable procedures below) described.   Performed By: Kady Medrano DO.    Indication: inability to gain peripheral access.  Informed consent: Verbal consent obtained.  The patient was counseled regarding the procedure in person, it's indications, risks, potential complications and alternatives and any questions were answered. Verbal consent was obtained.  Procedure: After routine sterile preparation, local anesthesia obtained by infiltration using 1% Lidocaine without epinephrine. A right 3-Lumen 7F Central Venous Catheter was placed by femoral vein approach and secured by standard fashion.   Ultrasound Guidance:   used.  Number of Attempts: 1  Post-procedure Findings: A post procedural chest x-ray  was not indicated.  Patient tolerated the procedure well.

## 2024-11-21 NOTE — PROGRESS NOTES
Spiritual Health History and Assessment/Progress Note  [facility]    Attempted Encounter,  ,  ,      Name: Diana Mann MRN: [id number]    Age: 29 y.o.     Sex: female   Language: English   Synagogue: [employer]   DKA, type 1, not at goal (HCC)     Date: 11/21/2024                           Spiritual Assessment began in [address] ICU        Referral/Consult From: Rounding   Encounter Overview/Reason: Attempted Encounter  Service Provided For: Patient    Sammi, Belief, Meaning:   Patient Other: Visit declined and unable to assess at this time  Family/Friends No family/friends present      Importance and Influence:  Patient unable to assess at this time  Family/Friends No family/friends present    Community:  Patient Other: visit declined.   Family/Friends No family/friends present    Assessment and Plan of Care:     Patient Interventions include: Other: visit declined.   Family/Friends Interventions include: No family/friends present    Patient Plan of Care: Spiritual Care available upon further referral  Family/Friends Plan of Care: Spiritual Care available upon further referral    Electronically signed by Chaplain John on 11/21/2024 at 10:28 AM

## 2024-11-21 NOTE — PROGRESS NOTES
Consulted for additional PIV site; ER nursing staff made multiple prior PIV attempts; a thorough PIV vascular assessment was performed on bilateral UEs; 2 PIV attempts were made (successful cannulation with blood return) but PIVs infiltrated after being flushed; Jhoana ABEL and provider (IVA Hu DO) were notified of the Pt's need for alternate venous access.  Electronically signed by Evelia Bills RN on 11/21/2024 at 4:46 AM

## 2024-11-21 NOTE — PATIENT CARE CONFERENCE
Intensive Care Daily Quality Rounding Checklist      ICU Team Members: Dr. Griffith, clinical pharmacist, charge nurse, bedside nurse, respiratory therapist     ICU Day #: NUMBER: 1    SOFA Score: 0    Intubation Date:  N/A    Ventilator Day #: N/A    Central Line Insertion Date: November 1        Day #: NUMBER: 1        Indication: CVCIndication: Limited/no vessels suitable for conventional peripheral access     Arterial Line Insertion Date:  N/A      Day #: N/A    Temporary Hemodialysis Catheter Insertion Date:  N/A      Day # N/A    DVT Prophylaxis: Lovenox    GI Prophylaxis: N/A    Bey Catheter Insertion Date:  N/A       Day #: N/A      Indications: N/A      Continued need (if yes, reason documented and discussed with physician): N/A    Skin Issues/ Wounds and ordered treatment discussed on rounds: None    Goals/ Plans for the Day: Monitor labs and vitals, treat/replace as needed.  Bridge when gap closed. Continue antibiotics.     Reviewed plan and goals for day with patient and/or representative: Yes

## 2024-11-21 NOTE — CARE COORDINATION
Social Work discharge planning  Consult for med assist noted.  Met with pt. Her grandmother lives with her in 1st home. Pt reports being independent with adls and iadls. She drives.   Pt said she has insulin pump, glucometer, Dexcom. Pt said she saw her PCP 2 days ago. Pt said she does not need to speak to DM education. Pt said her pharmacy told her some of her long term medications need prior auth. Pt said she was going to call her endocrinology Dr office about the prior auth, but then got sick and came to hospital.  Pt said she will follow up with Dr's office about long term medication prior auth.   Electronically signed by RYANN Bro on 11/21/2024 at 1:23 PM

## 2024-11-21 NOTE — CONSULTS
ENDOCRINOLOGY INITIAL CONSULTATION NOTE      Date of admission: 11/20/2024  Date of service: 11/21/2024  Admitting physician: Ignacio Shah DO   Primary Care Physician: Opal Iyer MD  Consultant physician: Hernesto Peter MD     Reason for the consultation:  Uncontrolled DM    History of Present Illness:  The history is provided by the patient. Accuracy of the patient data is excellent    Diana Mann is a very pleasant 29 y.o. old female with PMH of DMT1, depression, HTN and other listed below admitted to [facility] on 11/20/2024 because of N/V, abdominal pain for 2 days, endocrine service was consulted for diabetes management.  Patient is well known to our service. She recently visited our office 11/19 as she had not worn her pump since 11/15. She was on humalog for meals but didn't have lantus. Patient was to be put on lantus 16 and humalog 1 unit for every 13g of carbs with a low dose sliding scale. Attempting to upgrade patient to dexcom G7 as her phone has not been connecting to the dexcom G6. Did take 1x glargine before coming in but still felt bad with N/V and abdominal pain so she came in. Her grandmother is to bring in her omnipod. Will have glargine at home from now on as back up in case the pump or sensor stop working.   Admission labs showed AG 22, , BHB 6.87, UDS pos for cannabinoid, negative pregnancy test, vein pH 7.41, bicarb 15.     Prior to admission  The patient was diagnosed with type 1 DM at the age 13. Prior to admission patient was on omnipod5/dexcom G6 with current pump settings Basal 0.7, CR 13, ISF 53, Target 110, correct above 110, AIT 2:30 hours. Patient has had no hypoglycemic episodes. Patient has not been eating consistent carbohydrate meals, self-blood glucose monitoring has been above goal prior to admission.  The patient performs her own feet care and doesn't see podiatry service   Microvascular complications: + Neuropathy. No Retinopathy but is due for eye exam, No  seen in the endocrinology clinic for routine diabetes maintenance and prevention  Pt will follow with us after discharge. Endocrine follow up visit,    Dietary noncompliance  Discussed with patient the importance of eating consistent carbohydrate meals, avoiding high glycemic index food. Also, discussed with patient the risk and negative consequences of dietary noncompliance on blood glucose control, blood pressure and weight    Interdisciplinary plan for communication with healthcare providers:   Consult recommendations were discussed with the Primary Service/Nursing staff      The above issues were reviewed with the patient who understood and agreed with the plan.    Thank you for allowing us to participate in the care of this patient. Please do not hesitate to contact us with any additional questions.     I saw the patient and discussed the management with the resident physician Dr. John Rodríguez MD. I reviewed and agree with the findings and plan as documented in the resident's note     Hernesto Peter MD  Endocrinologist, [facility]   [address] [place] [id number]   Phone: [phone]  Fax: [phone]  --------------------------------------------  An electronic signature was used to authenticate this note. Hernesto Peter MD on 11/21/2024 at 7:02 AM

## 2024-11-21 NOTE — ED PROVIDER NOTES
[facility] EMERGENCY DEPARTMENT  EMERGENCY DEPARTMENT ENCOUNTER        Pt Name: Diana Mann  MRN: [id number]  Birthdate 1995  Date of evaluation: 11/20/2024  Provider: Harshil Nguyen DO  PCP: Opal Iyer MD  Note Started: 9:14 PM EST 11/20/24    CHIEF COMPLAINT       Chief Complaint   Patient presents with    Vomiting    Shortness of Breath     SOB, lightheaded the last 2 days     Back Pain     Lower back pain     Abdominal Pain     Mid/ upper abdominal pain       HISTORY OF PRESENT ILLNESS: 1 or more Elements     History from : Patient    Limitations to history : None    Diana Mann is a 29 y.o. female who was diagnosed with diabetes at age 13 presents stating she has been vomiting all day. She notes she is lightheaded.  Pt c/o mid abdominal pain and back pain,.  No urinary sx. patient states there is an insurance/pharmacy issue and she did not have her insulin for 48 hours..  She states she did take her insulin today.  Patient is on Lantus 16 units daily she previously was on the OmniPod and Dexcom I do not believe she is using this anymore, she is suppose to be on Basal 0.7, CR 14, ISF 53, Target 120, correct above 120, AIT 3 hours. Last A1c 15    Nursing Notes were all reviewed and agreed with or any disagreements were addressed in the HPI.    REVIEW OF SYSTEMS :      Review of Systems   Constitutional:  Positive for diaphoresis.   Respiratory:  Positive for shortness of breath.    Gastrointestinal:  Positive for abdominal pain, nausea and vomiting.       Positives and Pertinent negatives as per HPI.     SURGICAL HISTORY     Past Surgical History:   Procedure Laterality Date    ABDOMEN SURGERY N/A 5/9/2019    INCISION AND DRAINAGE OF SUPRAPUBIC ABSESS performed by Keaton JUAREZ MD at [facility] OR    BARTHOLIN GLAND CYST EXCISION      last yr       CURRENTMEDICATIONS       Previous Medications    AMLODIPINE (NORVASC) 5 MG TABLET    Take 1 tablet  Cervical back: Normal range of motion and neck supple. No muscular tenderness.   Skin:     General: Skin is warm.      Capillary Refill: Capillary refill takes less than 2 seconds.   Neurological:      General: No focal deficit present.      Mental Status: She is alert and oriented to person, place, and time.      Cranial Nerves: No cranial nerve deficit.   Psychiatric:         Mood and Affect: Mood is anxious.         Behavior: Behavior is agitated.             DIAGNOSTIC RESULTS   LABS:    Labs Reviewed   URINALYSIS WITH MICROSCOPIC - Abnormal; Notable for the following components:       Result Value    Glucose, Ur 250 (*)     Bilirubin, Urine MODERATE (*)     Ketones, Urine >80 (*)     Specific Gravity, UA >1.030 (*)     Urine Hgb SMALL (*)     Protein, UA >=300 (*)     Leukocyte Esterase, Urine SMALL (*)     WBC, UA 10 TO 20 (*)     RBC, UA 6 TO 9 (*)     Bacteria, UA 1+ (*)     Trichomonas PRESENT (*)     All other components within normal limits   CBC WITH AUTO DIFFERENTIAL - Abnormal; Notable for the following components:    MCV 79.8 (*)     MCHC 35.2 (*)     Neutrophils Absolute 8.65 (*)     Eosinophils Absolute 0.02 (*)     All other components within normal limits   BASIC METABOLIC PANEL - Abnormal; Notable for the following components:    Chloride 95 (*)     CO2 15 (*)     Anion Gap 22 (*)     Glucose 202 (*)     BUN 24 (*)     Creatinine 1.1 (*)     All other components within normal limits   LIPASE - Abnormal; Notable for the following components:    Lipase 7 (*)     All other components within normal limits   BETA-HYDROXYBUTYRATE - Abnormal; Notable for the following components:    Beta-Hydroxybutyrate 6.87 (*)     All other components within normal limits   URINE DRUG SCREEN - Abnormal; Notable for the following components:    Cannabinoid Scrn, Ur POSITIVE (*)     Oxycodone Screen, Ur POSITIVE (*)     All other components within normal limits   POCT GLUCOSE - Abnormal; Notable for the following

## 2024-11-21 NOTE — H&P
Trichimoniasis 11/19/2021       Surgical History:  Past Surgical History:   Procedure Laterality Date    ABDOMEN SURGERY N/A 5/9/2019    INCISION AND DRAINAGE OF SUPRAPUBIC ABSESS performed by Keaton JUAREZ MD at [facility] OR    BARTHOLIN GLAND CYST EXCISION      last yr       Medications Prior to Admission:    Prior to Visit Medications    Medication Sig Taking? Authorizing Provider   insulin lispro (HUMALOG,ADMELOG) 100 UNIT/ML SOLN injection vial For use in insulin pump.  In case of pump failure inject 1 unit for every 13 g of carbohydrates plus sliding scale. Max daily dose 100 units. Yes Lalitha Schmitz APRN - CNP   INSULIN SYRINGE .5CC/29G 29G X 1/2\" 0.5 ML MISC 1 each by Does not apply route daily Yes Lalitha Schmitz APRN - CNP   Insulin Disposable Pump (OMNIPOD 5 [id number] PODS GEN 5) MISC Change PODs  every 3 days Yes Lalitha Schmitz APRN - CNP   Continuous Glucose Sensor (DEXCOM G7 SENSOR) MISC Change sensor every 10 days Yes Lalitha Schmitz APRN - CNP   zolpidem (AMBIEN) 10 MG tablet TAKE ONE TABLET BY MOUTH EVERY NIGHT AT BEDTIME. max DAILY DOSE 10 MG Yes Opal Iyer MD   Continuous Glucose Sensor (DEXCOM G6 SENSOR) MISC To change every 10 days Yes Opal Iyer MD   Glucose (TRUEPLUS) 15 GM/32ML GEL Take 32 mLs by mouth as needed (low sugar) Yes Opal Iyer MD   glucagon 1 MG SOLR injection Inject 1 mg into the skin as needed for Low blood sugar (Blood glucose LESS THAN 70 mg/dL and patient NOT ALERT or NPO and does not have IV access.) Yes Dhiraj Nieves MD   Continuous Blood Gluc Transmit (DEXCOM G6 TRANSMITTER) MISC To change every 90 days Yes Lalitha Schmitz APRN - CNP   Insulin Disposable Pump (OMNIPOD 5 G6 INTRO, GEN 5,) KIT To use as directed Yes Vandana Mijares APRN - NP   Insulin Pen Needle (BD PEN NEEDLE SHELBIE U/F) 32G X 4 MM MISC Uses with insulin 4 times a day Yes Vandana Mijares APRN - NP   insulin glargine (LANTUS SOLOSTAR) 100 UNIT/ML injection pen Inject  16 Units into the skin nightly ONLY TO BE USED WHEN NO ON INSULIN PUMP  Patient not taking: Reported on 11/21/2024  Lalitha Schmitz APRN - CNP   ibuprofen (ADVIL;MOTRIN) 600 MG tablet Take 1 tablet by mouth every 6 hours as needed for Pain  Patient not taking: Reported on 11/21/2024  Kayden Us DO   amLODIPine (NORVASC) 5 MG tablet Take 1 tablet by mouth daily  Patient not taking: Reported on 11/21/2024  ProviderHali MD   metoprolol tartrate (LOPRESSOR) 50 MG tablet Take 1 tablet by mouth 2 times daily  Patient not taking: Reported on 7/16/2024  Hali Vanessa MD       Allergies:    Patient has no known allergies.    Social History:    reports that she has never smoked. She has never used smokeless tobacco. She reports current drug use. Frequency: 14.00 times per week. Drug: Marijuana (Weed). She reports that she does not drink alcohol.    Family History:   family history includes Diabetes in her maternal grandmother and paternal grandmother; Stroke in an other family member.       PHYSICAL EXAM:  Vitals:  /78   Pulse (!) 105   Temp 97.6 °F (36.4 °C) (Oral)   Resp 15   Ht 1.549 m (5' 1\")   Wt 52.1 kg (114 lb 13.8 oz)   SpO2 99%   BMI 21.70 kg/m²   Physical Exam  Constitutional:       Appearance: She is ill-appearing.   HENT:      Mouth/Throat:      Mouth: Mucous membranes are dry.   Cardiovascular:      Rate and Rhythm: Regular rhythm. Tachycardia present.   Pulmonary:      Breath sounds: Normal breath sounds. No wheezing, rhonchi or rales.   Abdominal:      General: There is no distension.      Palpations: Abdomen is soft.   Musculoskeletal:      Right lower leg: No edema.      Left lower leg: No edema.   Skin:     General: Skin is warm and dry.      Coloration: Skin is not jaundiced.      Findings: No erythema, lesion or rash.   Neurological:      General: No focal deficit present.   Psychiatric:         Mood and Affect: Mood normal.         Behavior: Behavior normal.

## 2024-11-22 VITALS
TEMPERATURE: 98.9 F | WEIGHT: 114.86 LBS | HEIGHT: 61 IN | DIASTOLIC BLOOD PRESSURE: 98 MMHG | BODY MASS INDEX: 21.69 KG/M2 | SYSTOLIC BLOOD PRESSURE: 140 MMHG | HEART RATE: 97 BPM | OXYGEN SATURATION: 98 % | RESPIRATION RATE: 18 BRPM

## 2024-11-22 LAB
ANION GAP SERPL CALCULATED.3IONS-SCNC: 11 MMOL/L (ref 7–16)
BASOPHILS # BLD: 0.05 K/UL (ref 0–0.2)
BASOPHILS NFR BLD: 1 % (ref 0–2)
BUN SERPL-MCNC: 10 MG/DL (ref 6–20)
CALCIUM SERPL-MCNC: 8.5 MG/DL (ref 8.6–10.2)
CHLORIDE SERPL-SCNC: 102 MMOL/L (ref 98–107)
CO2 SERPL-SCNC: 21 MMOL/L (ref 22–29)
CREAT SERPL-MCNC: 1 MG/DL (ref 0.5–1)
EOSINOPHIL # BLD: 0.05 K/UL (ref 0.05–0.5)
EOSINOPHILS RELATIVE PERCENT: 1 % (ref 0–6)
ERYTHROCYTE [DISTWIDTH] IN BLOOD BY AUTOMATED COUNT: 13.7 % (ref 11.5–15)
GFR, ESTIMATED: 77 ML/MIN/1.73M2
GLUCOSE BLD-MCNC: 151 MG/DL (ref 74–99)
GLUCOSE BLD-MCNC: 154 MG/DL (ref 74–99)
GLUCOSE SERPL-MCNC: 215 MG/DL (ref 74–99)
HCT VFR BLD AUTO: 31.5 % (ref 34–48)
HGB BLD-MCNC: 10.9 G/DL (ref 11.5–15.5)
IMM GRANULOCYTES # BLD AUTO: <0.03 K/UL (ref 0–0.58)
IMM GRANULOCYTES NFR BLD: 0 % (ref 0–5)
LYMPHOCYTES NFR BLD: 2.47 K/UL (ref 1.5–4)
LYMPHOCYTES RELATIVE PERCENT: 28 % (ref 20–42)
MAGNESIUM SERPL-MCNC: 2 MG/DL (ref 1.6–2.6)
MCH RBC QN AUTO: 27.9 PG (ref 26–35)
MCHC RBC AUTO-ENTMCNC: 34.6 G/DL (ref 32–34.5)
MCV RBC AUTO: 80.6 FL (ref 80–99.9)
MICROORGANISM SPEC CULT: NORMAL
MONOCYTES NFR BLD: 0.41 K/UL (ref 0.1–0.95)
MONOCYTES NFR BLD: 5 % (ref 2–12)
NEUTROPHILS NFR BLD: 66 % (ref 43–80)
NEUTS SEG NFR BLD: 5.74 K/UL (ref 1.8–7.3)
PHOSPHATE SERPL-MCNC: 3.5 MG/DL (ref 2.5–4.5)
PLATELET # BLD AUTO: 292 K/UL (ref 130–450)
PMV BLD AUTO: 10.3 FL (ref 7–12)
POTASSIUM SERPL-SCNC: 4.2 MMOL/L (ref 3.5–5)
RBC # BLD AUTO: 3.91 M/UL (ref 3.5–5.5)
SERVICE CMNT-IMP: NORMAL
SODIUM SERPL-SCNC: 134 MMOL/L (ref 132–146)
SPECIMEN DESCRIPTION: NORMAL
WBC OTHER # BLD: 8.7 K/UL (ref 4.5–11.5)

## 2024-11-22 PROCEDURE — 6370000000 HC RX 637 (ALT 250 FOR IP): Performed by: STUDENT IN AN ORGANIZED HEALTH CARE EDUCATION/TRAINING PROGRAM

## 2024-11-22 PROCEDURE — 36592 COLLECT BLOOD FROM PICC: CPT

## 2024-11-22 PROCEDURE — 85025 COMPLETE CBC W/AUTO DIFF WBC: CPT

## 2024-11-22 PROCEDURE — 83735 ASSAY OF MAGNESIUM: CPT

## 2024-11-22 PROCEDURE — 80048 BASIC METABOLIC PNL TOTAL CA: CPT

## 2024-11-22 PROCEDURE — 82947 ASSAY GLUCOSE BLOOD QUANT: CPT

## 2024-11-22 PROCEDURE — 6370000000 HC RX 637 (ALT 250 FOR IP): Performed by: INTERNAL MEDICINE

## 2024-11-22 PROCEDURE — 84100 ASSAY OF PHOSPHORUS: CPT

## 2024-11-22 PROCEDURE — 99239 HOSP IP/OBS DSCHRG MGMT >30: CPT | Performed by: STUDENT IN AN ORGANIZED HEALTH CARE EDUCATION/TRAINING PROGRAM

## 2024-11-22 RX ORDER — AMLODIPINE BESYLATE 5 MG/1
5 TABLET ORAL DAILY
Qty: 30 TABLET | Refills: 3 | Status: SHIPPED | OUTPATIENT
Start: 2024-11-22

## 2024-11-22 RX ORDER — DICYCLOMINE HYDROCHLORIDE 10 MG/1
10 CAPSULE ORAL
Qty: 40 CAPSULE | Refills: 0 | Status: SHIPPED | OUTPATIENT
Start: 2024-11-22 | End: 2024-12-02

## 2024-11-22 RX ORDER — PEN NEEDLE, DIABETIC 32 GX 1/4"
NEEDLE, DISPOSABLE MISCELLANEOUS
Qty: 250 EACH | Refills: 5 | Status: SHIPPED | OUTPATIENT
Start: 2024-11-22

## 2024-11-22 RX ORDER — INSULIN GLARGINE 100 [IU]/ML
INJECTION, SOLUTION SUBCUTANEOUS
Qty: 5 ADJUSTABLE DOSE PRE-FILLED PEN SYRINGE | Refills: 3 | Status: SHIPPED | OUTPATIENT
Start: 2024-11-22

## 2024-11-22 RX ORDER — METOPROLOL TARTRATE 50 MG
50 TABLET ORAL 2 TIMES DAILY
Qty: 60 TABLET | Refills: 3 | Status: SHIPPED | OUTPATIENT
Start: 2024-11-22

## 2024-11-22 RX ORDER — INSULIN LISPRO 100 [IU]/ML
INJECTION, SOLUTION INTRAVENOUS; SUBCUTANEOUS
Qty: 5 ADJUSTABLE DOSE PRE-FILLED PEN SYRINGE | Refills: 5 | Status: SHIPPED | OUTPATIENT
Start: 2024-11-22

## 2024-11-22 RX ORDER — METRONIDAZOLE 500 MG/1
500 TABLET ORAL EVERY 12 HOURS SCHEDULED
Qty: 11 TABLET | Refills: 0 | Status: SHIPPED | OUTPATIENT
Start: 2024-11-22 | End: 2024-11-28

## 2024-11-22 RX ADMIN — INSULIN LISPRO 1 UNITS: 100 INJECTION, SOLUTION INTRAVENOUS; SUBCUTANEOUS at 05:34

## 2024-11-22 RX ADMIN — METRONIDAZOLE 500 MG: 500 TABLET ORAL at 08:30

## 2024-11-22 RX ADMIN — INSULIN GLARGINE 12 UNITS: 100 INJECTION, SOLUTION SUBCUTANEOUS at 08:30

## 2024-11-22 RX ADMIN — AMLODIPINE BESYLATE 5 MG: 5 TABLET ORAL at 07:56

## 2024-11-22 RX ADMIN — METOPROLOL TARTRATE 50 MG: 50 TABLET, FILM COATED ORAL at 07:56

## 2024-11-22 ASSESSMENT — PAIN DESCRIPTION - DESCRIPTORS: DESCRIPTORS: ACHING;DISCOMFORT

## 2024-11-22 ASSESSMENT — PAIN SCALES - GENERAL: PAINLEVEL_OUTOF10: 8

## 2024-11-22 ASSESSMENT — PAIN DESCRIPTION - LOCATION: LOCATION: BACK

## 2024-11-22 ASSESSMENT — PAIN DESCRIPTION - ONSET: ONSET: ON-GOING

## 2024-11-22 NOTE — PLAN OF CARE
Problem: Chronic Conditions and Co-morbidities  Goal: Patient's chronic conditions and co-morbidity symptoms are monitored and maintained or improved  11/22/2024 0014 by Marivel Osuna RN  Outcome: Progressing  Flowsheets (Taken 11/21/2024 2000)  Care Plan - Patient's Chronic Conditions and Co-Morbidity Symptoms are Monitored and Maintained or Improved: Monitor and assess patient's chronic conditions and comorbid symptoms for stability, deterioration, or improvement  11/21/2024 1318 by Josefa Manuel RN  Outcome: Progressing  Flowsheets (Taken 11/21/2024 0500 by Salome Bolden RN)  Care Plan - Patient's Chronic Conditions and Co-Morbidity Symptoms are Monitored and Maintained or Improved: Monitor and assess patient's chronic conditions and comorbid symptoms for stability, deterioration, or improvement     Problem: Discharge Planning  Goal: Discharge to home or other facility with appropriate resources  11/22/2024 0014 by Marivel Osuna RN  Outcome: Progressing  Flowsheets (Taken 11/21/2024 2000)  Discharge to home or other facility with appropriate resources: Identify barriers to discharge with patient and caregiver  11/21/2024 1318 by Josefa Manuel RN  Outcome: Progressing  Flowsheets (Taken 11/21/2024 0500 by Salome Bolden RN)  Discharge to home or other facility with appropriate resources:   Identify barriers to discharge with patient and caregiver   Arrange for needed discharge resources and transportation as appropriate     Problem: Pain  Goal: Verbalizes/displays adequate comfort level or baseline comfort level  11/22/2024 0014 by Marivel Osuna RN  Outcome: Progressing  Flowsheets (Taken 11/21/2024 2004)  Verbalizes/displays adequate comfort level or baseline comfort level: Encourage patient to monitor pain and request assistance  11/21/2024 1318 by Josefa Manuel RN  Outcome: Progressing  Flowsheets (Taken 11/21/2024 0439 by Salome Bolden RN)  Verbalizes/displays adequate comfort

## 2024-11-22 NOTE — PROGRESS NOTES
Discharge instructions and medication education given to patient. IV removed with minimal bleeding and no complications. No visitors present at bedside.    No questions and concerns at this time.

## 2024-11-22 NOTE — DISCHARGE SUMMARY
[facility] Hospitalist Physician Discharge Summary       Opal Iyer MD  [address] [facility] [address]  [phone]    Schedule an appointment as soon as possible for a visit in 1 week(s)  Hospital Follow up    Hernesto Peter MD    Follow up        Activity level: As tolerated     Dispo: Home      Condition on discharge: Stable     Patient ID:  Diana Mann  [id number]  29 y.o.  1995    Admit date: 11/20/2024    Discharge date and time:  11/22/2024  10:13 AM    Admission Diagnoses: Principal Problem:    Type 1 diabetes mellitus with ketoacidosis without coma (HCC)  Active Problems:    Medical non-compliance    Vitamin D deficiency    Trichomoniasis    Depressive disorder    Primary hypertension    Primary insomnia  Resolved Problems:    * No resolved hospital problems. *      Discharge Diagnoses: Principal Problem:    Type 1 diabetes mellitus with ketoacidosis without coma (HCC)  Active Problems:    Medical non-compliance    Vitamin D deficiency    Trichomoniasis    Depressive disorder    Primary hypertension    Primary insomnia  Resolved Problems:    * No resolved hospital problems. *      Consults:  IP CONSULT TO CRITICAL CARE  IP CONSULT TO VASCULAR ACCESS TEAM  IP CONSULT TO ENDOCRINOLOGY  IP CONSULT TO CASE MANAGEMENT    Procedures:     Hospital Course:   Patient Diana Mann is a 29 y.o. with PMHx T1DM, trichomonas, HTN, non compliance with medication, insomnia who presented with DKA, type 1, not at goal (HCC) [E10.10]  Type 1 diabetes mellitus with ketoacidosis without coma (HCC) [E10.10]  Patient presented with abdominal pain, vomiting, lightheadedness, found to have DKA, she states she could not get more insulin pump so she saved her last one and tried to use subcutaneous insulin injections which did not keep her BG down. She went into DKA protocol with insulin drip and transitioned to Lantus and Lispro which she will be discharged with, eventually will be back to

## 2024-11-22 NOTE — PROGRESS NOTES
Nutrition Note     Reason for Visit:   Length of Stay    Nutrition Assessment:  Pt admit 2/2 type 1 DM with DKA. Transitioned now to PO diet/off insulin drip. Plan for pt to use insulin pump at home and follow up with Endocrinology as outpt after D/C. Current 3 carb/meal diet appropriate and pt tolerating. Discharge planning in progress for today. No additional nutrition recommendations at this time.      Current Nutrition Therapies:    ADULT DIET; Regular; 3 carb choices (45 gm/meal)    Anthropometrics:   Current Height: 154.9 cm (5' 1\")  Current Weight - Scale: 52.1 kg (114 lb 13.8 oz)      Monitoring and Evaluation:  No nutrition diagnosis. Patient will be monitored per nutrition standards of care.     Consult Dietitian if nutrition intervention essential to patient care is needed.     Discharge Planning:  No needs    Lisa Carpenter RD, CNSC, LD

## 2024-11-22 NOTE — DISCHARGE INSTRUCTIONS
Take Metronidazole 500 mg twice a day for the next 6 days. Resume your other home medications. Take Lantus 12u daily, Lispro 4u with meals plus sliding scale.     Please make sure to follow up with your primary care doctor and endocrinology.

## 2024-11-22 NOTE — PATIENT CARE CONFERENCE
Intensive Care Daily Quality Rounding Checklist      ICU Team Members: Dr. Jacob, Bedside nurse, Charge nurse    ICU Day #: GMF 11/21    SOFA Score:  0    Intubation Date: n/a     Ventilator Day #: n/a    Central Line Insertion Date: November 20        Day #: NUMBER: 2        Indication: CVCIndication: Limited/no vessels suitable for conventional peripheral access     Arterial Line Insertion Date:  n/a      Day #: n/a    Temporary Hemodialysis Catheter Insertion Date:  n/a      Day # n/a    DVT Prophylaxis:    GI Prophylaxis:    Bey Catheter Insertion Date:  n/a       Day #: n/a      Indications: n/a      Continued need (if yes, reason documented and discussed with physician): n/a    Skin Issues/ Wounds and ordered treatment discussed on rounds: No    Goals/ Plans for the Day: Monitor labs and vitals, treat/replace as needed.  Possible discharge.    Reviewed plan and goals for day with patient and/or representative: Yes    **SHARE this note so that the rounding nurse may edit**

## 2024-11-22 NOTE — PROGRESS NOTES
ENDOCRINOLOGY PROGRESS NOTE      Date of admission: 11/20/2024  Date of service: 11/22/2024  Admitting physician: Ignacio Shah DO   Primary Care Physician: Opal Iyer MD  Consultant physician: Hernesto Peter MD     Reason for the consultation:  Uncontrolled DM    History of Present Illness:  The history is provided by the patient. Accuracy of the patient data is excellent    Diana Mann is a very pleasant 29 y.o. old female with PMH of DMT1, depression, HTN and other listed below admitted to [facility] on 11/20/2024 because of N/V, abdominal pain for 2 days, endocrine service was consulted for diabetes management.  Patient is well known to our service. She recently visited our office 11/19 as she had not worn her pump since 11/15. She was on humalog for meals but didn't have lantus. Patient was to be put on lantus 16 and humalog 1 unit for every 13g of carbs with a low dose sliding scale. Attempting to upgrade patient to dexcom G7 as her phone has not been connecting to the dexcom G6. Did take 1x glargine before coming in but still felt bad with N/V and abdominal pain so she came in. Her grandmother is to bring in her omnipod. Will have glargine at home from now on as back up in case the pump or sensor stop working.   Admission labs showed AG 22, , BHB 6.87, UDS pos for cannabinoid, negative pregnancy test, vein pH 7.41, bicarb 15.   Prior to admission patient was on omnipod5/dexcom G6 with current pump settings Basal 0.7, CR 13, ISF 53, Target 110, correct above 110, AIT 2:30 hours.    Subjective:  Patient seen at bedside. Bridged with 16 glargine. No episodes of hypoglycemia. Eating well, being discharged today. Will continue to use pump at home.     Inpatient diet:   Carb Restricted diet     Point of care glucose monitoring   (Independently reviewed)   Recent Labs     11/21/24  0803 11/21/24  0902 11/21/24  1011 11/21/24  1210 11/21/24  1329 11/21/24  1631 11/21/24  1957 11/22/24  0757   POCGLU 142*

## 2024-11-22 NOTE — PLAN OF CARE
Problem: Chronic Conditions and Co-morbidities  Goal: Patient's chronic conditions and co-morbidity symptoms are monitored and maintained or improved  11/22/2024 1004 by Josefa Manuel RN  Outcome: Progressing  11/22/2024 0014 by Marivel Osuna RN  Outcome: Progressing  Flowsheets (Taken 11/21/2024 2000)  Care Plan - Patient's Chronic Conditions and Co-Morbidity Symptoms are Monitored and Maintained or Improved: Monitor and assess patient's chronic conditions and comorbid symptoms for stability, deterioration, or improvement     Problem: Discharge Planning  Goal: Discharge to home or other facility with appropriate resources  11/22/2024 1004 by Josefa Manuel RN  Outcome: Progressing  11/22/2024 0014 by Marivel Osuna RN  Outcome: Progressing  Flowsheets (Taken 11/21/2024 2000)  Discharge to home or other facility with appropriate resources: Identify barriers to discharge with patient and caregiver     Problem: Pain  Goal: Verbalizes/displays adequate comfort level or baseline comfort level  11/22/2024 1004 by Josefa Manuel RN  Outcome: Progressing  Flowsheets (Taken 11/22/2024 0404 by Marivel Osuna RN)  Verbalizes/displays adequate comfort level or baseline comfort level: Encourage patient to monitor pain and request assistance  11/22/2024 0014 by aMrivel Osuna RN  Outcome: Progressing  Flowsheets (Taken 11/21/2024 2004)  Verbalizes/displays adequate comfort level or baseline comfort level: Encourage patient to monitor pain and request assistance     Problem: ABCDS Injury Assessment  Goal: Absence of physical injury  11/22/2024 1004 by Josefa Manuel RN  Outcome: Progressing  11/22/2024 0014 by Mairvel Osuna RN  Outcome: Progressing  Flowsheets (Taken 11/22/2024 0012)  Absence of Physical Injury: Implement safety measures based on patient assessment     Problem: Safety - Adult  Goal: Free from fall injury  11/22/2024 1004 by Josefa Manuel RN  Outcome:

## 2024-11-23 LAB
MICROORGANISM SPEC CULT: ABNORMAL
MICROORGANISM SPEC CULT: ABNORMAL
SERVICE CMNT-IMP: ABNORMAL
SPECIMEN DESCRIPTION: ABNORMAL

## 2024-11-26 LAB
CHLAMYDIA DNA UR QL NAA+PROBE: NEGATIVE
N GONORRHOEA DNA UR QL NAA+PROBE: ABNORMAL
SPECIMEN DESCRIPTION: ABNORMAL

## 2025-01-06 ENCOUNTER — TELEMEDICINE (OUTPATIENT)
Dept: ENDOCRINOLOGY | Age: 30
End: 2025-01-06
Payer: COMMERCIAL

## 2025-01-06 DIAGNOSIS — Z91.119 DIETARY NONCOMPLIANCE: ICD-10-CM

## 2025-01-06 DIAGNOSIS — E10.65 TYPE 1 DIABETES MELLITUS WITH HYPERGLYCEMIA (HCC): Primary | ICD-10-CM

## 2025-01-06 DIAGNOSIS — Z96.41 INSULIN PUMP IN PLACE: ICD-10-CM

## 2025-01-06 DIAGNOSIS — Z97.8 USES SELF-APPLIED CONTINUOUS GLUCOSE MONITORING DEVICE: ICD-10-CM

## 2025-01-06 PROCEDURE — 3046F HEMOGLOBIN A1C LEVEL >9.0%: CPT | Performed by: INTERNAL MEDICINE

## 2025-01-06 PROCEDURE — G8420 CALC BMI NORM PARAMETERS: HCPCS | Performed by: INTERNAL MEDICINE

## 2025-01-06 PROCEDURE — 99214 OFFICE O/P EST MOD 30 MIN: CPT | Performed by: INTERNAL MEDICINE

## 2025-01-06 PROCEDURE — 95251 CONT GLUC MNTR ANALYSIS I&R: CPT | Performed by: INTERNAL MEDICINE

## 2025-01-06 PROCEDURE — 1036F TOBACCO NON-USER: CPT | Performed by: INTERNAL MEDICINE

## 2025-01-06 PROCEDURE — 2022F DILAT RTA XM EVC RTNOPTHY: CPT | Performed by: INTERNAL MEDICINE

## 2025-01-06 PROCEDURE — G8427 DOCREV CUR MEDS BY ELIG CLIN: HCPCS | Performed by: INTERNAL MEDICINE

## 2025-01-06 NOTE — PROGRESS NOTES
Diana Mann was read the following message “We want to confirm that, for purposes of billing, this is a virtual visit with your provider for which we will submit a claim for reimbursement with your insurance company. You will be responsible for any copays, coinsurance amounts or other amounts not covered by your insurance company. If you do not accept this, unfortunately we will not be able to schedule or proceed with a virtual visit with the provider. Do you accept? Diana responded Yes .   
glucose control   Check lipid panel    Diabetic gastroparesis  GI follow-up as necessary    Vitamin D deficiency  Encouraged vitamin D supplements    Diana Mann, was evaluated through a synchronous (real-time) audio-video encounter. The patient (or guardian if applicable) is aware that this is a billable service, which includes applicable co-pays. This Virtual Visit was conducted with patient's (and/or legal guardian's) consent. Patient identification was verified, and a caregiver was present when appropriate.   The patient was located at Home: [facility] [address]  [place] [id number]  Provider was located at Facility (Appt Dept): [address] [place] [address].[address] [place] [address]  Confirm you are appropriately licensed, registered, or certified to deliver care in the [facility] where the patient is located as indicated above. If you are not or unsure, please re-schedule the visit: Yes, I confirm.        Total time spent for this encounter: non time based     --Hernesto Peter MD on 1/6/2025 at 9:30 AM      Return in about 4 months (around 5/6/2025) for DM type 1, insulin pump in place.    The above issues were reviewed with the patient who understood and agreed with the plan.  Thank you for allowing us to participate in the care of this patient. Please do not hesitate to contact us with any additional questions.      Diagnosis Orders   1. Type 1 diabetes mellitus with hyperglycemia (HCC)        2. Dietary noncompliance        3. Insulin pump in place        4. Uses self-applied continuous glucose monitoring device            Hernesto Peter MD  [facility]   [address] [place] [address].  [address]  [place] [address]  Phone: [phone]  Fax: [phone]   -------------------------   An electronic signature was used to authenticate this note. Hernesto Peter MD on 1/6/2025 at 9:30 AM

## 2025-02-03 DIAGNOSIS — F51.01 PRIMARY INSOMNIA: ICD-10-CM

## 2025-02-03 RX ORDER — ZOLPIDEM TARTRATE 10 MG/1
TABLET ORAL
Qty: 30 TABLET | Refills: 0 | OUTPATIENT
Start: 2025-02-03 | End: 2025-05-04

## 2025-02-03 NOTE — TELEPHONE ENCOUNTER
Name of Medication(s) Requested:  Requested Prescriptions     Pending Prescriptions Disp Refills    zolpidem (AMBIEN) 10 MG tablet [Pharmacy Med Name: zolpidem 10 mg tablet] 30 tablet 0     Sig: TAKE ONE TABLET BY MOUTH EVERY NIGHT AT BEDTIME. max DAILY DOSE 10 MG       Medication is on current medication list Yes    Dosage and directions were verified? Yes    Quantity verified: 30 day supply     Pharmacy Verified?  Yes    Last Appointment:  7/16/2024    Future appts:  Future Appointments   Date Time Provider Department Center   3/19/2025 12:00 PM Lalitha Schmitz APRN - CNP [facility]        (If no appt send self scheduling link. .REFILLAPPT)  Scheduling request sent?     [x] Yes  [] No    Does patient need updated?  [] Yes  [x] No

## 2025-02-18 DIAGNOSIS — E10.65 TYPE 1 DIABETES MELLITUS WITH HYPERGLYCEMIA (HCC): ICD-10-CM

## 2025-02-18 RX ORDER — PROCHLORPERAZINE 25 MG/1
SUPPOSITORY RECTAL
Qty: 3 EACH | Refills: 2 | OUTPATIENT
Start: 2025-02-18

## 2025-02-18 NOTE — TELEPHONE ENCOUNTER
Name of Medication(s) Requested:  Requested Prescriptions     Pending Prescriptions Disp Refills    Continuous Glucose Sensor (DEXCOM G6 SENSOR) MISC [Pharmacy Med Name: Dexcom G6 Sensor device] 3 each 5     Sig: CHANGE every 10 DAYS as directed       Medication is on current medication list Yes    Dosage and directions were verified? Yes    Quantity verified: 90 day supply     Pharmacy Verified?  Yes    Last Appointment:  7/16/2024    Future appts:  Future Appointments   Date Time Provider Department Center   2/28/2025 10:45 [facility]   3/12/2025  3:30 PM Opal Iyer MD [facility] DEP   3/19/2025 12:00 PM Lalitha Schmitz APRN - CNP [facility]        (If no appt send self scheduling link. .REFILLAPPT)  Scheduling request sent?     [] Yes  [x] No    Does patient need updated?  [] Yes  [x] No

## 2025-02-24 ENCOUNTER — TELEPHONE (OUTPATIENT)
Dept: PRIMARY CARE CLINIC | Age: 30
End: 2025-02-24

## 2025-02-24 NOTE — TELEPHONE ENCOUNTER
Patient says she is going to [facility] walk in today but I made her an appointment for 800 am tomorrow to see Dr Iyer.

## 2025-02-24 NOTE — TELEPHONE ENCOUNTER
Patient called to see if she could move up her 02/28/2025 appointment due to her BP being quite high and sweating because of it. She said it was 216/125 and that is when I told her to go to [facility] walk in so they can treat it immediately. Told her that it is very important that they get her BP down asap.  Dr Iyer is not here on Mondays so I did not want her to wait until tomorrow. Told her to call me if she needed anything else.

## 2025-02-25 ENCOUNTER — OFFICE VISIT (OUTPATIENT)
Dept: PRIMARY CARE CLINIC | Age: 30
End: 2025-02-25
Payer: COMMERCIAL

## 2025-02-25 VITALS
HEIGHT: 61 IN | DIASTOLIC BLOOD PRESSURE: 80 MMHG | SYSTOLIC BLOOD PRESSURE: 122 MMHG | WEIGHT: 107 LBS | BODY MASS INDEX: 20.2 KG/M2 | TEMPERATURE: 97.9 F

## 2025-02-25 DIAGNOSIS — F51.01 PRIMARY INSOMNIA: ICD-10-CM

## 2025-02-25 DIAGNOSIS — E10.9 TYPE 1 DIABETES MELLITUS WITHOUT COMPLICATION (HCC): Primary | ICD-10-CM

## 2025-02-25 DIAGNOSIS — I10 PRIMARY HYPERTENSION: ICD-10-CM

## 2025-02-25 DIAGNOSIS — N92.0 MENORRHAGIA WITH REGULAR CYCLE: ICD-10-CM

## 2025-02-25 LAB
ALBUMIN: 4 G/DL (ref 3.5–5.2)
ALP BLD-CCNC: 92 U/L (ref 35–104)
ALT SERPL-CCNC: 18 U/L (ref 0–32)
ANION GAP SERPL CALCULATED.3IONS-SCNC: 15 MMOL/L (ref 7–16)
AST SERPL-CCNC: 23 U/L (ref 0–31)
BASOPHILS ABSOLUTE: 0.04 K/UL (ref 0–0.2)
BASOPHILS RELATIVE PERCENT: 1 % (ref 0–2)
BILIRUB SERPL-MCNC: <0.2 MG/DL (ref 0–1.2)
BUN BLDV-MCNC: 16 MG/DL (ref 6–20)
CALCIUM SERPL-MCNC: 9.6 MG/DL (ref 8.6–10.2)
CHLORIDE BLD-SCNC: 99 MMOL/L (ref 98–107)
CHOLESTEROL, TOTAL: 206 MG/DL
CO2: 22 MMOL/L (ref 22–29)
CREAT SERPL-MCNC: 0.9 MG/DL (ref 0.5–1)
CREATININE URINE: 42.7 MG/DL (ref 29–226)
EOSINOPHILS ABSOLUTE: 0.07 K/UL (ref 0.05–0.5)
EOSINOPHILS RELATIVE PERCENT: 1 % (ref 0–6)
GFR, ESTIMATED: 85 ML/MIN/1.73M2
GLUCOSE FASTING: 472 MG/DL (ref 74–99)
HBA1C MFR BLD: 13 %
HCT VFR BLD CALC: 35.2 % (ref 34–48)
HDLC SERPL-MCNC: 51 MG/DL
HEMOGLOBIN: 11.2 G/DL (ref 11.5–15.5)
IMMATURE GRANULOCYTES %: 0 % (ref 0–5)
IMMATURE GRANULOCYTES ABSOLUTE: <0.03 K/UL (ref 0–0.58)
LDL CHOLESTEROL: 118 MG/DL
LYMPHOCYTES ABSOLUTE: 2.74 K/UL (ref 1.5–4)
LYMPHOCYTES RELATIVE PERCENT: 34 % (ref 20–42)
MCH RBC QN AUTO: 28.1 PG (ref 26–35)
MCHC RBC AUTO-ENTMCNC: 31.8 G/DL (ref 32–34.5)
MCV RBC AUTO: 88.4 FL (ref 80–99.9)
MICROALBUMIN/CREAT 24H UR: 432 MG/L (ref 0–19)
MICROALBUMIN/CREAT UR-RTO: 1012 MCG/MG CREAT (ref 0–30)
MONOCYTES ABSOLUTE: 0.42 K/UL (ref 0.1–0.95)
MONOCYTES RELATIVE PERCENT: 5 % (ref 2–12)
NEUTROPHILS ABSOLUTE: 4.69 K/UL (ref 1.8–7.3)
NEUTROPHILS RELATIVE PERCENT: 59 % (ref 43–80)
PDW BLD-RTO: 12.9 % (ref 11.5–15)
PLATELET # BLD: 305 K/UL (ref 130–450)
PMV BLD AUTO: 11.9 FL (ref 7–12)
POTASSIUM SERPL-SCNC: 5.4 MMOL/L (ref 3.5–5)
RBC # BLD: 3.98 M/UL (ref 3.5–5.5)
SODIUM BLD-SCNC: 136 MMOL/L (ref 132–146)
TOTAL PROTEIN: 7.7 G/DL (ref 6.4–8.3)
TRIGL SERPL-MCNC: 187 MG/DL
VLDLC SERPL CALC-MCNC: 37 MG/DL
WBC # BLD: 8 K/UL (ref 4.5–11.5)

## 2025-02-25 PROCEDURE — 3074F SYST BP LT 130 MM HG: CPT | Performed by: INTERNAL MEDICINE

## 2025-02-25 PROCEDURE — 99214 OFFICE O/P EST MOD 30 MIN: CPT | Performed by: INTERNAL MEDICINE

## 2025-02-25 PROCEDURE — 3046F HEMOGLOBIN A1C LEVEL >9.0%: CPT | Performed by: INTERNAL MEDICINE

## 2025-02-25 PROCEDURE — G8420 CALC BMI NORM PARAMETERS: HCPCS | Performed by: INTERNAL MEDICINE

## 2025-02-25 PROCEDURE — 3079F DIAST BP 80-89 MM HG: CPT | Performed by: INTERNAL MEDICINE

## 2025-02-25 PROCEDURE — 2022F DILAT RTA XM EVC RTNOPTHY: CPT | Performed by: INTERNAL MEDICINE

## 2025-02-25 PROCEDURE — G8427 DOCREV CUR MEDS BY ELIG CLIN: HCPCS | Performed by: INTERNAL MEDICINE

## 2025-02-25 PROCEDURE — 83036 HEMOGLOBIN GLYCOSYLATED A1C: CPT | Performed by: INTERNAL MEDICINE

## 2025-02-25 PROCEDURE — 36415 COLL VENOUS BLD VENIPUNCTURE: CPT | Performed by: INTERNAL MEDICINE

## 2025-02-25 PROCEDURE — 1036F TOBACCO NON-USER: CPT | Performed by: INTERNAL MEDICINE

## 2025-02-25 RX ORDER — AMLODIPINE BESYLATE 5 MG/1
5 TABLET ORAL DAILY
Qty: 90 TABLET | Refills: 0 | Status: SHIPPED | OUTPATIENT
Start: 2025-02-25

## 2025-02-25 RX ORDER — ZOLPIDEM TARTRATE 10 MG/1
10 TABLET ORAL NIGHTLY PRN
Qty: 90 TABLET | Refills: 0 | Status: CANCELLED | OUTPATIENT
Start: 2025-02-25 | End: 2025-05-26

## 2025-02-25 RX ORDER — METOPROLOL TARTRATE 50 MG
50 TABLET ORAL 2 TIMES DAILY
Qty: 180 TABLET | Refills: 0 | Status: SHIPPED | OUTPATIENT
Start: 2025-02-25

## 2025-02-25 NOTE — PROGRESS NOTES
Diana Mann presents today for follow up of Diabetes, HTN, Insomnia, poor compliance    Current Outpatient Medications   Medication Sig Dispense Refill    amLODIPine (NORVASC) 5 MG tablet Take 1 tablet by mouth daily 90 tablet 0    metoprolol tartrate (LOPRESSOR) 50 MG tablet Take 1 tablet by mouth 2 times daily 180 tablet 0    insulin glargine (LANTUS SOLOSTAR) 100 UNIT/ML injection pen Inject 12 units daily in the morning (Patient taking differently: 16 Units Inject 12 units daily in the morning) 5 Adjustable Dose Pre-filled Pen Syringe 3    Insulin Pen Needle (BD PEN NEEDLE MICRO U/F) 32G X 6 MM MISC Uses with insulin 4 times a day. (okay to change to any other brands as per insurance coverage) 250 each 5    insulin lispro, 1 Unit Dial, (HUMALOG KWIKPEN) 100 UNIT/ML SOPN Inject 4 units three times daily with meals plus the following scale. -200 add 1U, -250 add 2U, -300 add 3U, -350 add 4U, -400 add 5U, BS over 400 add 6U 5 Adjustable Dose Pre-filled Pen Syringe 5    INSULIN SYRINGE .5CC/29G 29G X 1/2\" 0.5 ML MISC 1 each by Does not apply route daily 120 each 11    Insulin Disposable Pump (OMNIPOD 5 [id number] PODS GEN 5) MISC Change PODs  every 3 days 30 each 3    Continuous Glucose Sensor (DEXCOM G7 SENSOR) MISC Change sensor every 10 days 9 each 3    Glucose (TRUEPLUS) 15 GM/32ML GEL Take 32 mLs by mouth as needed (low sugar) 5 each 0    Insulin Disposable Pump (OMNIPOD 5 G6 INTRO, GEN 5,) KIT To use as directed 1 kit 0    dicyclomine (BENTYL) 10 MG capsule Take 1 capsule by mouth 4 times daily (before meals and nightly) for 10 days 40 capsule 0    Continuous Glucose Sensor (DEXCOM G6 SENSOR) MISC To change every 10 days 3 each 5    glucagon 1 MG SOLR injection Inject 1 mg into the skin as needed for Low blood sugar (Blood glucose LESS THAN 70 mg/dL and patient NOT ALERT or NPO and does not have IV access.) 1 each 1    Continuous Blood Gluc Transmit (DEXCOM G6 TRANSMITTER) MISC

## 2025-02-26 ENCOUNTER — TELEPHONE (OUTPATIENT)
Dept: ENDOCRINOLOGY | Age: 30
End: 2025-02-26

## 2025-02-26 DIAGNOSIS — E10.65 TYPE 1 DIABETES MELLITUS WITH HYPERGLYCEMIA (HCC): ICD-10-CM

## 2025-02-26 RX ORDER — ACYCLOVIR 400 MG/1
TABLET ORAL
Qty: 9 EACH | Refills: 3 | Status: SHIPPED | OUTPATIENT
Start: 2025-02-26

## 2025-02-26 NOTE — TELEPHONE ENCOUNTER
Please call patient and let her know that I reordered Dexcom G7 to her pharmacy.    Please ask her to pick them up and call Symone if she has trouble linking to her pump.    Symone's phone number is [phone]    Please have her make every attempt to have her Dexcom be linked for her upcoming appointment so that we can make pump adjustments to help get her A1c down.  
negative...

## 2025-03-19 ENCOUNTER — OFFICE VISIT (OUTPATIENT)
Dept: ENDOCRINOLOGY | Age: 30
End: 2025-03-19
Payer: COMMERCIAL

## 2025-03-19 VITALS
SYSTOLIC BLOOD PRESSURE: 106 MMHG | RESPIRATION RATE: 18 BRPM | BODY MASS INDEX: 20.96 KG/M2 | HEIGHT: 61 IN | WEIGHT: 111 LBS | HEART RATE: 85 BPM | OXYGEN SATURATION: 99 % | TEMPERATURE: 97.7 F | DIASTOLIC BLOOD PRESSURE: 78 MMHG

## 2025-03-19 DIAGNOSIS — K31.84 DIABETIC GASTROPARESIS ASSOCIATED WITH TYPE 1 DIABETES MELLITUS: ICD-10-CM

## 2025-03-19 DIAGNOSIS — Z91.119 DIETARY NONCOMPLIANCE: ICD-10-CM

## 2025-03-19 DIAGNOSIS — E10.21 TYPE 1 DIABETES MELLITUS WITH DIABETIC NEPHROPATHY (HCC): ICD-10-CM

## 2025-03-19 DIAGNOSIS — E55.9 VITAMIN D DEFICIENCY: ICD-10-CM

## 2025-03-19 DIAGNOSIS — E10.65 TYPE 1 DIABETES MELLITUS WITH HYPERGLYCEMIA (HCC): Primary | ICD-10-CM

## 2025-03-19 DIAGNOSIS — E78.5 HYPERLIPIDEMIA, UNSPECIFIED HYPERLIPIDEMIA TYPE: ICD-10-CM

## 2025-03-19 DIAGNOSIS — E10.43 DIABETIC GASTROPARESIS ASSOCIATED WITH TYPE 1 DIABETES MELLITUS: ICD-10-CM

## 2025-03-19 PROCEDURE — 3074F SYST BP LT 130 MM HG: CPT | Performed by: FAMILY MEDICINE

## 2025-03-19 PROCEDURE — 99214 OFFICE O/P EST MOD 30 MIN: CPT | Performed by: FAMILY MEDICINE

## 2025-03-19 PROCEDURE — 3046F HEMOGLOBIN A1C LEVEL >9.0%: CPT | Performed by: FAMILY MEDICINE

## 2025-03-19 PROCEDURE — G8427 DOCREV CUR MEDS BY ELIG CLIN: HCPCS | Performed by: FAMILY MEDICINE

## 2025-03-19 PROCEDURE — 1036F TOBACCO NON-USER: CPT | Performed by: FAMILY MEDICINE

## 2025-03-19 PROCEDURE — G8420 CALC BMI NORM PARAMETERS: HCPCS | Performed by: FAMILY MEDICINE

## 2025-03-19 PROCEDURE — 3078F DIAST BP <80 MM HG: CPT | Performed by: FAMILY MEDICINE

## 2025-03-19 PROCEDURE — 2022F DILAT RTA XM EVC RTNOPTHY: CPT | Performed by: FAMILY MEDICINE

## 2025-03-19 RX ORDER — ZOLPIDEM TARTRATE 10 MG/1
10 TABLET ORAL NIGHTLY
COMMUNITY

## 2025-03-19 RX ORDER — LISINOPRIL 2.5 MG/1
2.5 TABLET ORAL DAILY
Qty: 30 TABLET | Refills: 11 | Status: SHIPPED | OUTPATIENT
Start: 2025-03-19

## 2025-03-19 NOTE — PATIENT INSTRUCTIONS
Symone from [employer] will contact you for a re-training appt.    Her phone number is Symone with [employer] [phone]

## 2025-03-19 NOTE — PROGRESS NOTES
[facility] PHYSICIANS [place] [facility] Department of Endocrinology Diabetes and Metabolism   [address] [place] [address]    Phone: [phone]  Fax: [phone]    Date of Service: 3/19/2025  Primary Care Physician: Opal Iyer MD.  Provider: MIKE Foster CNP      Reason for the visit:  Type 1 DM    History of Present Illness:   The history is provided by the patient. No  was used. Accuracy of the patient data is excellent.  Diana Mann is a very pleasant 29 y.o. female seen today for follow up visit     Last office visit June 2024    Diana Mann was diagnosed with diabetes at age 13   Previous use of Medtronic pump  Now ordered OmniPod 5/Dexcom G6    Last use was several months ago.  Patient states she was never able to properly get it linked.  She did not attend training sessions.    Now taking Lantus 16 units nightly and Humalog 4 units 3 times a day plus low-dose sliding scale, but admits to missing insulin doses at least 2 times per day    Once to be retrained on OmniPod.  Once to use the iPhone mark, therefore she will have to transition back to Dexcom G6 which she states she has at home.    Lab Results   Component Value Date/Time    LABA1C 13.0 02/25/2025 08:55 AM    LABA1C 11.9 11/21/2024 05:03 AM    LABA1C 11.9 11/20/2024 09:29 PM    LABA1C 11.7 06/16/2024 10:13 PM     Infrequent hypoglycemia + Awareness  Her diabetes complicated with gastroparesis   I reviewed current medications and the patient has no issues with diabetes medications  Diana Mann is up to date   The patient performs her own feet care and doesn't see podiatry service   Microvascular complications: No Neuropathy. No Retinopathy, No Nephropathy   Macrovascular complications: no CAD, PVD, or Stroke  The patient receives Flushot every year. She has not received the pneumonia vaccine.    PAST MEDICAL HISTORY   Past Medical History:   Diagnosis Date    Bleeding at insertion

## 2025-04-04 ENCOUNTER — OFFICE VISIT (OUTPATIENT)
Dept: PRIMARY CARE CLINIC | Age: 30
End: 2025-04-04
Payer: COMMERCIAL

## 2025-04-04 VITALS
HEIGHT: 61 IN | DIASTOLIC BLOOD PRESSURE: 78 MMHG | HEART RATE: 95 BPM | BODY MASS INDEX: 20.77 KG/M2 | OXYGEN SATURATION: 98 % | WEIGHT: 110 LBS | SYSTOLIC BLOOD PRESSURE: 108 MMHG | TEMPERATURE: 98.5 F

## 2025-04-04 DIAGNOSIS — E10.65 TYPE 1 DIABETES MELLITUS WITH HYPERGLYCEMIA (HCC): ICD-10-CM

## 2025-04-04 DIAGNOSIS — Z00.00 ANNUAL PHYSICAL EXAM: Primary | ICD-10-CM

## 2025-04-04 DIAGNOSIS — I10 PRIMARY HYPERTENSION: ICD-10-CM

## 2025-04-04 PROBLEM — E10.10 TYPE 1 DIABETES MELLITUS WITH KETOACIDOSIS WITHOUT COMA (HCC): Status: RESOLVED | Noted: 2024-06-17 | Resolved: 2025-04-04

## 2025-04-04 PROCEDURE — 99395 PREV VISIT EST AGE 18-39: CPT | Performed by: INTERNAL MEDICINE

## 2025-04-04 PROCEDURE — 3074F SYST BP LT 130 MM HG: CPT | Performed by: INTERNAL MEDICINE

## 2025-04-04 PROCEDURE — 3078F DIAST BP <80 MM HG: CPT | Performed by: INTERNAL MEDICINE

## 2025-04-04 RX ORDER — PEN NEEDLE, DIABETIC 32GX 5/32"
NEEDLE, DISPOSABLE MISCELLANEOUS
Qty: 200 EACH | Refills: 5 | Status: SHIPPED | OUTPATIENT
Start: 2025-04-04

## 2025-04-04 RX ORDER — AMLODIPINE BESYLATE 10 MG/1
10 TABLET ORAL DAILY
Qty: 30 TABLET | Refills: 3 | Status: SHIPPED | OUTPATIENT
Start: 2025-04-04

## 2025-04-04 RX ORDER — LISINOPRIL 10 MG/1
10 TABLET ORAL DAILY
Qty: 90 TABLET | Refills: 1 | Status: SHIPPED | OUTPATIENT
Start: 2025-04-04

## 2025-04-04 ASSESSMENT — PATIENT HEALTH QUESTIONNAIRE - PHQ9: DEPRESSION UNABLE TO ASSESS: PT REFUSES

## 2025-04-04 NOTE — PROGRESS NOTES
Diana Mann presents today for  Annual Exam    Current Outpatient Medications   Medication Sig Dispense Refill    Insulin Pen Needle (BD PEN NEEDLE SHELBIE U/F) 32G X 4 MM MISC Uses with insulin 4 times a day 200 each 5    amLODIPine (NORVASC) 10 MG tablet Take 1 tablet by mouth daily 30 tablet 3    lisinopril (PRINIVIL;ZESTRIL) 10 MG tablet Take 1 tablet by mouth daily 90 tablet 1    zolpidem (AMBIEN) 10 MG tablet Take 1 tablet by mouth at bedtime.      metoprolol tartrate (LOPRESSOR) 50 MG tablet Take 1 tablet by mouth 2 times daily 180 tablet 0    insulin glargine (LANTUS SOLOSTAR) 100 UNIT/ML injection pen Inject 12 units daily in the morning 5 Adjustable Dose Pre-filled Pen Syringe 3    insulin lispro, 1 Unit Dial, (HUMALOG KWIKPEN) 100 UNIT/ML SOPN Inject 4 units three times daily with meals plus the following scale. -200 add 1U, -250 add 2U, -300 add 3U, -350 add 4U, -400 add 5U, BS over 400 add 6U 5 Adjustable Dose Pre-filled Pen Syringe 5    Glucose (TRUEPLUS) 15 GM/32ML GEL Take 32 mLs by mouth as needed (low sugar) 5 each 0    glucagon 1 MG SOLR injection Inject 1 mg into the skin as needed for Low blood sugar (Blood glucose LESS THAN 70 mg/dL and patient NOT ALERT or NPO and does not have IV access.) 1 each 1    Insulin Pen Needle (BD PEN NEEDLE MICRO U/F) 32G X 6 MM MISC Uses with insulin 4 times a day. (okay to change to any other brands as per insurance coverage) (Patient not taking: Reported on 4/4/2025) 250 each 5    INSULIN SYRINGE .5CC/29G 29G X 1/2\" 0.5 ML MISC 1 each by Does not apply route daily 120 each 11    Insulin Disposable Pump (OMNIPOD 5 [id number] PODS GEN 5) MISC Change PODs  every 3 days (Patient not taking: Reported on 4/4/2025) 30 each 3    Continuous Blood Gluc Transmit (DEXCOM G6 TRANSMITTER) MISC To change every 90 days (Patient not taking: Reported on 4/4/2025) 1 each 3    Insulin Disposable Pump (OMNIPOD 5 G6 INTRO, GEN 5,) KIT To use as directed

## 2025-04-28 DIAGNOSIS — E10.65 TYPE 1 DIABETES MELLITUS WITH HYPERGLYCEMIA (HCC): Primary | ICD-10-CM

## 2025-04-28 RX ORDER — PROCHLORPERAZINE 25 MG/1
SUPPOSITORY RECTAL
Qty: 1 EACH | Refills: 3 | Status: SHIPPED | OUTPATIENT
Start: 2025-04-28

## 2025-04-28 RX ORDER — PROCHLORPERAZINE 25 MG/1
SUPPOSITORY RECTAL
Qty: 9 EACH | Refills: 3 | Status: SHIPPED | OUTPATIENT
Start: 2025-04-28

## 2025-04-29 ENCOUNTER — HOSPITAL ENCOUNTER (EMERGENCY)
Age: 30
Discharge: HOME | End: 2025-04-29
Attending: EMERGENCY MEDICINE
Payer: COMMERCIAL

## 2025-04-29 ENCOUNTER — APPOINTMENT (OUTPATIENT)
Dept: GENERAL RADIOLOGY | Age: 30
End: 2025-04-29
Payer: COMMERCIAL

## 2025-04-29 VITALS
SYSTOLIC BLOOD PRESSURE: 90 MMHG | TEMPERATURE: 97.3 F | RESPIRATION RATE: 16 BRPM | BODY MASS INDEX: 21.71 KG/M2 | HEART RATE: 100 BPM | OXYGEN SATURATION: 100 % | DIASTOLIC BLOOD PRESSURE: 66 MMHG | WEIGHT: 115 LBS | HEIGHT: 61 IN

## 2025-04-29 DIAGNOSIS — E86.0 DEHYDRATION: Primary | ICD-10-CM

## 2025-04-29 DIAGNOSIS — J06.9 ACUTE UPPER RESPIRATORY INFECTION: ICD-10-CM

## 2025-04-29 LAB
ALBUMIN SERPL-MCNC: 3.7 G/DL (ref 3.5–5.2)
ALP SERPL-CCNC: 87 U/L (ref 35–104)
ALT SERPL-CCNC: 17 U/L (ref 0–32)
ANION GAP SERPL CALCULATED.3IONS-SCNC: 14 MMOL/L (ref 7–16)
AST SERPL-CCNC: 23 U/L (ref 0–31)
BASOPHILS # BLD: 0.04 K/UL (ref 0–0.2)
BASOPHILS NFR BLD: 1 % (ref 0–2)
BILIRUB SERPL-MCNC: <0.2 MG/DL (ref 0–1.2)
BUN SERPL-MCNC: 28 MG/DL (ref 6–20)
CALCIUM SERPL-MCNC: 9.7 MG/DL (ref 8.6–10.2)
CHLORIDE SERPL-SCNC: 96 MMOL/L (ref 98–107)
CO2 SERPL-SCNC: 23 MMOL/L (ref 22–29)
CREAT SERPL-MCNC: 1.5 MG/DL (ref 0.5–1)
EOSINOPHIL # BLD: 0.06 K/UL (ref 0.05–0.5)
EOSINOPHILS RELATIVE PERCENT: 1 % (ref 0–6)
ERYTHROCYTE [DISTWIDTH] IN BLOOD BY AUTOMATED COUNT: 12.7 % (ref 11.5–15)
GFR, ESTIMATED: 48 ML/MIN/1.73M2
GLUCOSE SERPL-MCNC: 119 MG/DL (ref 74–99)
HCG UR QL: NEGATIVE
HCT VFR BLD AUTO: 35.7 % (ref 34–48)
HGB BLD-MCNC: 12.3 G/DL (ref 11.5–15.5)
IMM GRANULOCYTES # BLD AUTO: 0.04 K/UL (ref 0–0.58)
IMM GRANULOCYTES NFR BLD: 1 % (ref 0–5)
INFLUENZA A BY PCR: NOT DETECTED
INFLUENZA B BY PCR: NOT DETECTED
INR PPP: 0.9
LACTATE BLDV-SCNC: 1.3 MMOL/L (ref 0.5–2.2)
LACTATE BLDV-SCNC: 2.4 MMOL/L (ref 0.5–2.2)
LYMPHOCYTES NFR BLD: 2.04 K/UL (ref 1.5–4)
LYMPHOCYTES RELATIVE PERCENT: 27 % (ref 20–42)
MAGNESIUM SERPL-MCNC: 2.3 MG/DL (ref 1.6–2.6)
MCH RBC QN AUTO: 28.7 PG (ref 26–35)
MCHC RBC AUTO-ENTMCNC: 34.5 G/DL (ref 32–34.5)
MCV RBC AUTO: 83.2 FL (ref 80–99.9)
MONOCYTES NFR BLD: 0.51 K/UL (ref 0.1–0.95)
MONOCYTES NFR BLD: 7 % (ref 2–12)
NEUTROPHILS NFR BLD: 65 % (ref 43–80)
NEUTS SEG NFR BLD: 5.01 K/UL (ref 1.8–7.3)
PH VENOUS: 7.4 (ref 7.35–7.45)
PLATELET # BLD AUTO: 366 K/UL (ref 130–450)
PMV BLD AUTO: 10.3 FL (ref 7–12)
POTASSIUM SERPL-SCNC: 4.1 MMOL/L (ref 3.5–5)
PROT SERPL-MCNC: 7.5 G/DL (ref 6.4–8.3)
PROTHROMBIN TIME: 9.6 SEC (ref 9.3–12.4)
RBC # BLD AUTO: 4.29 M/UL (ref 3.5–5.5)
SARS-COV-2 RDRP RESP QL NAA+PROBE: NOT DETECTED
SODIUM SERPL-SCNC: 133 MMOL/L (ref 132–146)
SPECIMEN DESCRIPTION: NORMAL
WBC OTHER # BLD: 7.7 K/UL (ref 4.5–11.5)

## 2025-04-29 PROCEDURE — 87502 INFLUENZA DNA AMP PROBE: CPT

## 2025-04-29 PROCEDURE — 83735 ASSAY OF MAGNESIUM: CPT

## 2025-04-29 PROCEDURE — 99284 EMERGENCY DEPT VISIT MOD MDM: CPT

## 2025-04-29 PROCEDURE — 85025 COMPLETE CBC W/AUTO DIFF WBC: CPT

## 2025-04-29 PROCEDURE — 84703 CHORIONIC GONADOTROPIN ASSAY: CPT

## 2025-04-29 PROCEDURE — 71046 X-RAY EXAM CHEST 2 VIEWS: CPT

## 2025-04-29 PROCEDURE — 85610 PROTHROMBIN TIME: CPT

## 2025-04-29 PROCEDURE — 87635 SARS-COV-2 COVID-19 AMP PRB: CPT

## 2025-04-29 PROCEDURE — 80053 COMPREHEN METABOLIC PANEL: CPT

## 2025-04-29 PROCEDURE — 96361 HYDRATE IV INFUSION ADD-ON: CPT

## 2025-04-29 PROCEDURE — 6370000000 HC RX 637 (ALT 250 FOR IP): Performed by: EMERGENCY MEDICINE

## 2025-04-29 PROCEDURE — 83605 ASSAY OF LACTIC ACID: CPT

## 2025-04-29 PROCEDURE — 2580000003 HC RX 258: Performed by: EMERGENCY MEDICINE

## 2025-04-29 PROCEDURE — 82800 BLOOD PH: CPT

## 2025-04-29 PROCEDURE — 96360 HYDRATION IV INFUSION INIT: CPT

## 2025-04-29 RX ORDER — IBUPROFEN 600 MG/1
600 TABLET, FILM COATED ORAL 4 TIMES DAILY PRN
Qty: 360 TABLET | Refills: 1 | Status: SHIPPED | OUTPATIENT
Start: 2025-04-29

## 2025-04-29 RX ORDER — IBUPROFEN 400 MG/1
400 TABLET, FILM COATED ORAL ONCE
Status: COMPLETED | OUTPATIENT
Start: 2025-04-29 | End: 2025-04-29

## 2025-04-29 RX ORDER — 0.9 % SODIUM CHLORIDE 0.9 %
1500 INTRAVENOUS SOLUTION INTRAVENOUS ONCE
Status: COMPLETED | OUTPATIENT
Start: 2025-04-29 | End: 2025-04-29

## 2025-04-29 RX ORDER — OXYCODONE AND ACETAMINOPHEN 5; 325 MG/1; MG/1
2 TABLET ORAL ONCE
Refills: 0 | Status: COMPLETED | OUTPATIENT
Start: 2025-04-29 | End: 2025-04-29

## 2025-04-29 RX ORDER — ONDANSETRON 4 MG/1
4 TABLET, ORALLY DISINTEGRATING ORAL 3 TIMES DAILY PRN
Qty: 21 TABLET | Refills: 0 | Status: SHIPPED | OUTPATIENT
Start: 2025-04-29

## 2025-04-29 RX ORDER — 0.9 % SODIUM CHLORIDE 0.9 %
1000 INTRAVENOUS SOLUTION INTRAVENOUS ONCE
Status: COMPLETED | OUTPATIENT
Start: 2025-04-29 | End: 2025-04-29

## 2025-04-29 RX ADMIN — SODIUM CHLORIDE 1500 ML: 0.9 INJECTION, SOLUTION INTRAVENOUS at 12:42

## 2025-04-29 RX ADMIN — OXYCODONE HYDROCHLORIDE AND ACETAMINOPHEN 2 TABLET: 5; 325 TABLET ORAL at 12:41

## 2025-04-29 RX ADMIN — IBUPROFEN 400 MG: 400 TABLET, FILM COATED ORAL at 17:37

## 2025-04-29 RX ADMIN — SODIUM CHLORIDE 1000 ML: 0.9 INJECTION, SOLUTION INTRAVENOUS at 14:44

## 2025-04-29 ASSESSMENT — PATIENT HEALTH QUESTIONNAIRE - PHQ9
4. FEELING TIRED OR HAVING LITTLE ENERGY: NEARLY EVERY DAY
9. THOUGHTS THAT YOU WOULD BE BETTER OFF DEAD, OR OF HURTING YOURSELF: NOT AT ALL
7. TROUBLE CONCENTRATING ON THINGS, SUCH AS READING THE NEWSPAPER OR WATCHING TELEVISION: NOT AT ALL
SUM OF ALL RESPONSES TO PHQ QUESTIONS 1-9: 13
2. FEELING DOWN, DEPRESSED OR HOPELESS: SEVERAL DAYS
SUM OF ALL RESPONSES TO PHQ QUESTIONS 1-9: 13
5. POOR APPETITE OR OVEREATING: NEARLY EVERY DAY
3. TROUBLE FALLING OR STAYING ASLEEP: NEARLY EVERY DAY
SUM OF ALL RESPONSES TO PHQ QUESTIONS 1-9: 13
1. LITTLE INTEREST OR PLEASURE IN DOING THINGS: NEARLY EVERY DAY
SUM OF ALL RESPONSES TO PHQ QUESTIONS 1-9: 13
6. FEELING BAD ABOUT YOURSELF - OR THAT YOU ARE A FAILURE OR HAVE LET YOURSELF OR YOUR FAMILY DOWN: NOT AT ALL
8. MOVING OR SPEAKING SO SLOWLY THAT OTHER PEOPLE COULD HAVE NOTICED. OR THE OPPOSITE, BEING SO FIGETY OR RESTLESS THAT YOU HAVE BEEN MOVING AROUND A LOT MORE THAN USUAL: NOT AT ALL
10. IF YOU CHECKED OFF ANY PROBLEMS, HOW DIFFICULT HAVE THESE PROBLEMS MADE IT FOR YOU TO DO YOUR WORK, TAKE CARE OF THINGS AT HOME, OR GET ALONG WITH OTHER PEOPLE: VERY DIFFICULT

## 2025-04-29 ASSESSMENT — PAIN SCALES - GENERAL: PAINLEVEL_OUTOF10: 10

## 2025-04-29 ASSESSMENT — PAIN DESCRIPTION - LOCATION: LOCATION: BACK

## 2025-04-29 ASSESSMENT — PAIN DESCRIPTION - ORIENTATION: ORIENTATION: LOWER

## 2025-04-29 ASSESSMENT — PAIN - FUNCTIONAL ASSESSMENT: PAIN_FUNCTIONAL_ASSESSMENT: ACTIVITIES ARE NOT PREVENTED

## 2025-04-29 ASSESSMENT — PAIN DESCRIPTION - DESCRIPTORS: DESCRIPTORS: ACHING;SORE

## 2025-04-29 NOTE — ED PROVIDER NOTES
secretions,  Neck: Supple, full ROM,   Respiratory:  Not in respiratory distress  Cardiovascular:  Regular rate. Regular rhythm. No murmurs, gallops, or rubs. 2+ distal pulses    GI:  Abdomen Soft, Non tender, Non distended.  .   Musculoskeletal: Moves all extremities x 4. Warm and well perfused,   Integument: skin warm and dry. No rashes.   Lymphatic: no lymphadenopathy noted  Neurologic: GCS 15, no focal deficits,   Psychiatric: Normal Affect      Medical Decision Making:    Concern for URI/COVID/flu/pneumonia/[place] anemia/EMILY/DKA.  Not in DKA.  Does have dehydration.  Glucose noted.  Patient proved with IV fluids.  Chest x-ray clear.  No COVID or flu.  Blood pressure stable in ER after fluids.  Lactic downtrending.  DC with outpatient follow-up      -------------------------------------------------- RESULTS -------------------------------------------------  I have personally reviewed all laboratory and imaging results for this patient. Results are listed below.     LABS:  Results for orders placed or performed during the hospital encounter of 04/29/25   COVID-19, Rapid    Specimen: Nasopharyngeal Swab   Result Value Ref Range    Specimen Description .NASOPHARYNGEAL SWAB     SARS-CoV-2, Rapid Not Detected Not Detected   Influenza A+B, PCR    Specimen: Nasal   Result Value Ref Range    Influenza A by PCR Not Detected Not Detected    Influenza B by PCR Not Detected Not Detected   CMP   Result Value Ref Range    Sodium 133 132 - 146 mmol/L    Potassium 4.1 3.5 - 5.0 mmol/L    Chloride 96 (L) 98 - 107 mmol/L    CO2 23 22 - 29 mmol/L    Anion Gap 14 7 - 16 mmol/L    Glucose 119 (H) 74 - 99 mg/dL    BUN 28 (H) 6 - 20 mg/dL    Creatinine 1.5 (H) 0.50 - 1.00 mg/dL    Est, Glom Filt Rate 48 (L) >60 mL/min/1.73m2    Calcium 9.7 8.6 - 10.2 mg/dL    Total Protein 7.5 6.4 - 8.3 g/dL    Albumin 3.7 3.5 - 5.2 g/dL    Total Bilirubin <0.2 0.0 - 1.2 mg/dL    Alkaline Phosphatase 87 35 - 104 U/L    ALT 17 0 - 32 U/L    AST

## 2025-05-02 ASSESSMENT — PATIENT HEALTH QUESTIONNAIRE - PHQ9
4. FEELING TIRED OR HAVING LITTLE ENERGY: NEARLY EVERY DAY
10. IF YOU CHECKED OFF ANY PROBLEMS, HOW DIFFICULT HAVE THESE PROBLEMS MADE IT FOR YOU TO DO YOUR WORK, TAKE CARE OF THINGS AT HOME, OR GET ALONG WITH OTHER PEOPLE: VERY DIFFICULT
SUM OF ALL RESPONSES TO PHQ QUESTIONS 1-9: 13
7. TROUBLE CONCENTRATING ON THINGS, SUCH AS READING THE NEWSPAPER OR WATCHING TELEVISION: NOT AT ALL
3. TROUBLE FALLING OR STAYING ASLEEP: NEARLY EVERY DAY
6. FEELING BAD ABOUT YOURSELF - OR THAT YOU ARE A FAILURE OR HAVE LET YOURSELF OR YOUR FAMILY DOWN: NOT AT ALL
1. LITTLE INTEREST OR PLEASURE IN DOING THINGS: NEARLY EVERY DAY
9. THOUGHTS THAT YOU WOULD BE BETTER OFF DEAD, OR OF HURTING YOURSELF: NOT AT ALL
2. FEELING DOWN, DEPRESSED OR HOPELESS: SEVERAL DAYS
8. MOVING OR SPEAKING SO SLOWLY THAT OTHER PEOPLE COULD HAVE NOTICED. OR THE OPPOSITE - BEING SO FIDGETY OR RESTLESS THAT YOU HAVE BEEN MOVING AROUND A LOT MORE THAN USUAL: NOT AT ALL
5. POOR APPETITE OR OVEREATING: NEARLY EVERY DAY

## 2025-05-14 ENCOUNTER — OFFICE VISIT (OUTPATIENT)
Dept: PRIMARY CARE CLINIC | Age: 30
End: 2025-05-14
Payer: COMMERCIAL

## 2025-05-14 VITALS
WEIGHT: 117 LBS | BODY MASS INDEX: 22.09 KG/M2 | HEART RATE: 94 BPM | HEIGHT: 61 IN | DIASTOLIC BLOOD PRESSURE: 78 MMHG | SYSTOLIC BLOOD PRESSURE: 108 MMHG | OXYGEN SATURATION: 98 % | TEMPERATURE: 98 F

## 2025-05-14 DIAGNOSIS — E10.65 POORLY CONTROLLED TYPE 1 DIABETES MELLITUS (HCC): ICD-10-CM

## 2025-05-14 DIAGNOSIS — Z91.199 MEDICAL NON-COMPLIANCE: ICD-10-CM

## 2025-05-14 DIAGNOSIS — I10 PRIMARY HYPERTENSION: Primary | ICD-10-CM

## 2025-05-14 PROCEDURE — 3046F HEMOGLOBIN A1C LEVEL >9.0%: CPT | Performed by: INTERNAL MEDICINE

## 2025-05-14 PROCEDURE — 2022F DILAT RTA XM EVC RTNOPTHY: CPT | Performed by: INTERNAL MEDICINE

## 2025-05-14 PROCEDURE — 99214 OFFICE O/P EST MOD 30 MIN: CPT | Performed by: INTERNAL MEDICINE

## 2025-05-14 PROCEDURE — 3078F DIAST BP <80 MM HG: CPT | Performed by: INTERNAL MEDICINE

## 2025-05-14 PROCEDURE — G8427 DOCREV CUR MEDS BY ELIG CLIN: HCPCS | Performed by: INTERNAL MEDICINE

## 2025-05-14 PROCEDURE — 1036F TOBACCO NON-USER: CPT | Performed by: INTERNAL MEDICINE

## 2025-05-14 PROCEDURE — 3074F SYST BP LT 130 MM HG: CPT | Performed by: INTERNAL MEDICINE

## 2025-05-14 PROCEDURE — G8420 CALC BMI NORM PARAMETERS: HCPCS | Performed by: INTERNAL MEDICINE

## 2025-05-14 RX ORDER — INSULIN LISPRO 100 [IU]/ML
INJECTION, SOLUTION INTRAVENOUS; SUBCUTANEOUS
COMMUNITY

## 2025-05-14 NOTE — PROGRESS NOTES
Diana Mann presents today for follow up of Uncontrolled Diabetes     Current Outpatient Medications   Medication Sig Dispense Refill    insulin lispro (HUMALOG,ADMELOG) 100 UNIT/ML SOLN injection vial Inject into the skin 3 times daily (with meals) Uses with omnipod      Glucose 4-6 GM-MG CHEW Take 1 each by mouth as needed (for low blood sugar) 30 tablet 1    ibuprofen (ADVIL;MOTRIN) 600 MG tablet Take 1 tablet by mouth 4 times daily as needed for Pain 360 tablet 1    ondansetron (ZOFRAN-ODT) 4 MG disintegrating tablet Take 1 tablet by mouth 3 times daily as needed for Nausea or Vomiting 21 tablet 0    Continuous Glucose Transmitter (DEXCOM G6 TRANSMITTER) MISC Change every 90 days 1 each 3    Continuous Glucose Sensor (DEXCOM G6 SENSOR) MISC Change every 10 days 9 each 3    lisinopril (PRINIVIL;ZESTRIL) 10 MG tablet Take 1 tablet by mouth daily 90 tablet 1    zolpidem (AMBIEN) 10 MG tablet Take 1 tablet by mouth at bedtime.      metoprolol tartrate (LOPRESSOR) 50 MG tablet Take 1 tablet by mouth 2 times daily 180 tablet 0    Insulin Disposable Pump (OMNIPOD 5 CAGT2N2 PODS GEN 5) MISC Change PODs  every 3 days 30 each 3    Glucose (TRUEPLUS) 15 GM/32ML GEL Take 32 mLs by mouth as needed (low sugar) 5 each 0    Insulin Disposable Pump (OMNIPOD 5 G6 INTRO, GEN 5,) KIT To use as directed (Patient not taking: Reported on 5/14/2025) 1 kit 0     No current facility-administered medications for this visit.      Past Medical History:   Diagnosis Date    Acidosis     Bleeding at insertion site 01/05/2018    Common femoral artery injury, right, initial encounter 01/05/2018    COVID-19 virus infection     Depressive disorder     Diabetic ketoacidosis (HCC)     DM type 1 (diabetes mellitus, type 1) (HCC)     Lactic acid acidosis 08/31/2020    Marijuana abuse     Non-compliance     Primary hypertension 06/02/2023    Pyelonephritis     Trichimoniasis 11/19/2021          Subjective:  AS above.  Has had full functioning

## 2025-05-15 PROBLEM — R19.7 NAUSEA, VOMITING AND DIARRHEA: Status: RESOLVED | Noted: 2020-12-14 | Resolved: 2025-05-15

## 2025-05-15 PROBLEM — R11.2 NAUSEA, VOMITING AND DIARRHEA: Status: RESOLVED | Noted: 2020-12-14 | Resolved: 2025-05-15

## 2025-05-15 PROBLEM — E87.20 LACTIC ACID ACIDOSIS: Status: RESOLVED | Noted: 2020-08-31 | Resolved: 2025-05-15

## 2025-05-15 PROBLEM — E16.2 HYPOGLYCEMIA: Status: RESOLVED | Noted: 2020-05-22 | Resolved: 2025-05-15

## 2025-05-15 ASSESSMENT — ENCOUNTER SYMPTOMS
DIARRHEA: 0
BLOOD IN STOOL: 0
RHINORRHEA: 0
COLOR CHANGE: 0
SORE THROAT: 0
ABDOMINAL PAIN: 0
VOMITING: 0
SINUS PRESSURE: 0
BACK PAIN: 0
ABDOMINAL DISTENTION: 0
CONSTIPATION: 0
TROUBLE SWALLOWING: 0
ALLERGIC/IMMUNOLOGIC NEGATIVE: 1
NAUSEA: 0

## 2025-05-22 ENCOUNTER — HOSPITAL ENCOUNTER (EMERGENCY)
Age: 30
Discharge: HOME | End: 2025-05-23
Attending: STUDENT IN AN ORGANIZED HEALTH CARE EDUCATION/TRAINING PROGRAM
Payer: COMMERCIAL

## 2025-05-22 DIAGNOSIS — R11.2 NAUSEA AND VOMITING, UNSPECIFIED VOMITING TYPE: ICD-10-CM

## 2025-05-22 DIAGNOSIS — R10.84 DIFFUSE ABDOMINAL PAIN: Primary | ICD-10-CM

## 2025-05-22 ASSESSMENT — PAIN DESCRIPTION - ONSET: ONSET: SUDDEN

## 2025-05-22 ASSESSMENT — PAIN DESCRIPTION - LOCATION: LOCATION: ABDOMEN;BACK

## 2025-05-22 ASSESSMENT — PAIN DESCRIPTION - PAIN TYPE: TYPE: ACUTE PAIN

## 2025-05-22 ASSESSMENT — PAIN DESCRIPTION - DESCRIPTORS: DESCRIPTORS: ACHING;STABBING

## 2025-05-22 ASSESSMENT — PAIN - FUNCTIONAL ASSESSMENT: PAIN_FUNCTIONAL_ASSESSMENT: 0-10

## 2025-05-22 ASSESSMENT — PAIN DESCRIPTION - FREQUENCY: FREQUENCY: CONTINUOUS

## 2025-05-22 ASSESSMENT — PAIN SCALES - GENERAL: PAINLEVEL_OUTOF10: 10

## 2025-05-23 ENCOUNTER — APPOINTMENT (OUTPATIENT)
Dept: ULTRASOUND IMAGING | Age: 30
End: 2025-05-23
Payer: COMMERCIAL

## 2025-05-23 ENCOUNTER — APPOINTMENT (OUTPATIENT)
Dept: CT IMAGING | Age: 30
End: 2025-05-23
Payer: COMMERCIAL

## 2025-05-23 ENCOUNTER — HOSPITAL ENCOUNTER (OUTPATIENT)
Age: 30
Setting detail: OBSERVATION
Discharge: HOME | End: 2025-05-25
Attending: EMERGENCY MEDICINE | Admitting: HOSPITALIST
Payer: COMMERCIAL

## 2025-05-23 VITALS
HEIGHT: 61 IN | HEART RATE: 100 BPM | OXYGEN SATURATION: 100 % | SYSTOLIC BLOOD PRESSURE: 174 MMHG | DIASTOLIC BLOOD PRESSURE: 122 MMHG | TEMPERATURE: 97.8 F | BODY MASS INDEX: 22.28 KG/M2 | RESPIRATION RATE: 12 BRPM | WEIGHT: 118 LBS

## 2025-05-23 DIAGNOSIS — R10.84 GENERALIZED ABDOMINAL PAIN: Primary | ICD-10-CM

## 2025-05-23 DIAGNOSIS — R11.2 NAUSEA AND VOMITING, UNSPECIFIED VOMITING TYPE: ICD-10-CM

## 2025-05-23 LAB
ALBUMIN SERPL-MCNC: 4.2 G/DL (ref 3.5–5.2)
ALBUMIN SERPL-MCNC: 4.5 G/DL (ref 3.5–5.2)
ALP SERPL-CCNC: 98 U/L (ref 35–104)
ALP SERPL-CCNC: 99 U/L (ref 35–104)
ALT SERPL-CCNC: 41 U/L (ref 0–32)
ALT SERPL-CCNC: 44 U/L (ref 0–32)
ANION GAP SERPL CALCULATED.3IONS-SCNC: 15 MMOL/L (ref 7–16)
ANION GAP SERPL CALCULATED.3IONS-SCNC: 17 MMOL/L (ref 7–16)
AST SERPL-CCNC: 42 U/L (ref 0–31)
AST SERPL-CCNC: 43 U/L (ref 0–31)
B-OH-BUTYR SERPL-MCNC: 0.57 MMOL/L (ref 0.02–0.27)
B-OH-BUTYR SERPL-MCNC: 1.7 MMOL/L (ref 0.02–0.27)
BACTERIA URNS QL MICRO: ABNORMAL
BACTERIA URNS QL MICRO: ABNORMAL
BASOPHILS # BLD: 0.02 K/UL (ref 0–0.2)
BASOPHILS # BLD: 0.05 K/UL (ref 0–0.2)
BASOPHILS NFR BLD: 0 % (ref 0–2)
BASOPHILS NFR BLD: 1 % (ref 0–2)
BILIRUB DIRECT SERPL-MCNC: <0.2 MG/DL (ref 0–0.3)
BILIRUB INDIRECT SERPL-MCNC: ABNORMAL MG/DL (ref 0–1)
BILIRUB SERPL-MCNC: 0.3 MG/DL (ref 0–1.2)
BILIRUB SERPL-MCNC: 0.6 MG/DL (ref 0–1.2)
BILIRUB UR QL STRIP: NEGATIVE
BILIRUB UR QL STRIP: NEGATIVE
BUN SERPL-MCNC: 12 MG/DL (ref 6–20)
BUN SERPL-MCNC: 13 MG/DL (ref 6–20)
CALCIUM SERPL-MCNC: 10 MG/DL (ref 8.6–10.2)
CALCIUM SERPL-MCNC: 9.7 MG/DL (ref 8.6–10.2)
CHLORIDE SERPL-SCNC: 103 MMOL/L (ref 98–107)
CHLORIDE SERPL-SCNC: 104 MMOL/L (ref 98–107)
CHP ED QC CHECK: YES
CLARITY UR: ABNORMAL
CLARITY UR: CLEAR
CO2 SERPL-SCNC: 20 MMOL/L (ref 22–29)
CO2 SERPL-SCNC: 21 MMOL/L (ref 22–29)
COLOR UR: YELLOW
COLOR UR: YELLOW
CREAT SERPL-MCNC: 0.8 MG/DL (ref 0.5–1)
CREAT SERPL-MCNC: 0.9 MG/DL (ref 0.5–1)
EOSINOPHIL # BLD: 0 K/UL (ref 0.05–0.5)
EOSINOPHIL # BLD: 0.03 K/UL (ref 0.05–0.5)
EOSINOPHILS RELATIVE PERCENT: 0 % (ref 0–6)
EOSINOPHILS RELATIVE PERCENT: 0 % (ref 0–6)
EPI CELLS #/AREA URNS HPF: ABNORMAL /HPF
EPI CELLS #/AREA URNS HPF: ABNORMAL /HPF
ERYTHROCYTE [DISTWIDTH] IN BLOOD BY AUTOMATED COUNT: 13.6 % (ref 11.5–15)
ERYTHROCYTE [DISTWIDTH] IN BLOOD BY AUTOMATED COUNT: 13.7 % (ref 11.5–15)
GFR, ESTIMATED: >90 ML/MIN/1.73M2
GFR, ESTIMATED: >90 ML/MIN/1.73M2
GLUCOSE BLD-MCNC: 105 MG/DL (ref 74–99)
GLUCOSE BLD-MCNC: 121 MG/DL
GLUCOSE BLD-MCNC: 121 MG/DL (ref 74–99)
GLUCOSE SERPL-MCNC: 117 MG/DL (ref 74–99)
GLUCOSE SERPL-MCNC: 143 MG/DL (ref 74–99)
GLUCOSE UR STRIP-MCNC: NEGATIVE MG/DL
GLUCOSE UR STRIP-MCNC: NEGATIVE MG/DL
HCG, URINE, POC: NEGATIVE
HCT VFR BLD AUTO: 34.2 % (ref 34–48)
HCT VFR BLD AUTO: 36.4 % (ref 34–48)
HGB BLD-MCNC: 11.8 G/DL (ref 11.5–15.5)
HGB BLD-MCNC: 12.4 G/DL (ref 11.5–15.5)
HGB UR QL STRIP.AUTO: ABNORMAL
HGB UR QL STRIP.AUTO: ABNORMAL
IMM GRANULOCYTES # BLD AUTO: 0.04 K/UL (ref 0–0.58)
IMM GRANULOCYTES # BLD AUTO: 0.04 K/UL (ref 0–0.58)
IMM GRANULOCYTES NFR BLD: 0 % (ref 0–5)
IMM GRANULOCYTES NFR BLD: 0 % (ref 0–5)
KETONES UR STRIP-MCNC: 15 MG/DL
KETONES UR STRIP-MCNC: NEGATIVE MG/DL
LACTATE BLDV-SCNC: 1.7 MMOL/L (ref 0.5–2.2)
LEUKOCYTE ESTERASE UR QL STRIP: NEGATIVE
LEUKOCYTE ESTERASE UR QL STRIP: NEGATIVE
LIPASE SERPL-CCNC: 10 U/L (ref 13–60)
LIPASE SERPL-CCNC: 13 U/L (ref 13–60)
LYMPHOCYTES NFR BLD: 2.06 K/UL (ref 1.5–4)
LYMPHOCYTES NFR BLD: 2.2 K/UL (ref 1.5–4)
LYMPHOCYTES RELATIVE PERCENT: 19 % (ref 20–42)
LYMPHOCYTES RELATIVE PERCENT: 21 % (ref 20–42)
Lab: NORMAL
MAGNESIUM SERPL-MCNC: 2.1 MG/DL (ref 1.6–2.6)
MCH RBC QN AUTO: 28.4 PG (ref 26–35)
MCH RBC QN AUTO: 28.6 PG (ref 26–35)
MCHC RBC AUTO-ENTMCNC: 34.1 G/DL (ref 32–34.5)
MCHC RBC AUTO-ENTMCNC: 34.5 G/DL (ref 32–34.5)
MCV RBC AUTO: 82.4 FL (ref 80–99.9)
MCV RBC AUTO: 83.9 FL (ref 80–99.9)
MONOCYTES NFR BLD: 0.26 K/UL (ref 0.1–0.95)
MONOCYTES NFR BLD: 0.36 K/UL (ref 0.1–0.95)
MONOCYTES NFR BLD: 2 % (ref 2–12)
MONOCYTES NFR BLD: 3 % (ref 2–12)
NEGATIVE QC PASS/FAIL: NORMAL
NEUTROPHILS NFR BLD: 74 % (ref 43–80)
NEUTROPHILS NFR BLD: 78 % (ref 43–80)
NEUTS SEG NFR BLD: 7.77 K/UL (ref 1.8–7.3)
NEUTS SEG NFR BLD: 8.43 K/UL (ref 1.8–7.3)
NITRITE UR QL STRIP: NEGATIVE
NITRITE UR QL STRIP: NEGATIVE
PH UR STRIP: 7.5 [PH] (ref 5–8)
PH UR STRIP: 8.5 [PH] (ref 5–8)
PH VENOUS: 7.46 (ref 7.35–7.45)
PH VENOUS: 7.49 (ref 7.35–7.45)
PLATELET # BLD AUTO: 264 K/UL (ref 130–450)
PLATELET # BLD AUTO: 267 K/UL (ref 130–450)
PMV BLD AUTO: 10 FL (ref 7–12)
PMV BLD AUTO: 10.8 FL (ref 7–12)
POSITIVE QC PASS/FAIL: NORMAL
POTASSIUM SERPL-SCNC: 3.7 MMOL/L (ref 3.5–5)
POTASSIUM SERPL-SCNC: 4.1 MMOL/L (ref 3.5–5)
PROT SERPL-MCNC: 8.2 G/DL (ref 6.4–8.3)
PROT SERPL-MCNC: 8.9 G/DL (ref 6.4–8.3)
PROT UR STRIP-MCNC: >=300 MG/DL
PROT UR STRIP-MCNC: >=300 MG/DL
RBC # BLD AUTO: 4.15 M/UL (ref 3.5–5.5)
RBC # BLD AUTO: 4.34 M/UL (ref 3.5–5.5)
RBC #/AREA URNS HPF: ABNORMAL /HPF
RBC #/AREA URNS HPF: ABNORMAL /HPF
SODIUM SERPL-SCNC: 139 MMOL/L (ref 132–146)
SODIUM SERPL-SCNC: 141 MMOL/L (ref 132–146)
SP GR UR STRIP: 1.02 (ref 1–1.03)
SP GR UR STRIP: 1.02 (ref 1–1.03)
UROBILINOGEN UR STRIP-ACNC: 0.2 EU/DL (ref 0–1)
UROBILINOGEN UR STRIP-ACNC: 0.2 EU/DL (ref 0–1)
WBC #/AREA URNS HPF: ABNORMAL /HPF
WBC #/AREA URNS HPF: ABNORMAL /HPF
WBC OTHER # BLD: 10.5 K/UL (ref 4.5–11.5)
WBC OTHER # BLD: 10.8 K/UL (ref 4.5–11.5)

## 2025-05-23 PROCEDURE — 83690 ASSAY OF LIPASE: CPT

## 2025-05-23 PROCEDURE — 76705 ECHO EXAM OF ABDOMEN: CPT

## 2025-05-23 PROCEDURE — 82962 GLUCOSE BLOOD TEST: CPT

## 2025-05-23 PROCEDURE — 83605 ASSAY OF LACTIC ACID: CPT

## 2025-05-23 PROCEDURE — 93005 ELECTROCARDIOGRAM TRACING: CPT

## 2025-05-23 PROCEDURE — 80053 COMPREHEN METABOLIC PANEL: CPT

## 2025-05-23 PROCEDURE — 99285 EMERGENCY DEPT VISIT HI MDM: CPT

## 2025-05-23 PROCEDURE — 82010 KETONE BODYS QUAN: CPT

## 2025-05-23 PROCEDURE — 96374 THER/PROPH/DIAG INJ IV PUSH: CPT

## 2025-05-23 PROCEDURE — 6370000000 HC RX 637 (ALT 250 FOR IP)

## 2025-05-23 PROCEDURE — 83735 ASSAY OF MAGNESIUM: CPT

## 2025-05-23 PROCEDURE — 96376 TX/PRO/DX INJ SAME DRUG ADON: CPT

## 2025-05-23 PROCEDURE — 82248 BILIRUBIN DIRECT: CPT

## 2025-05-23 PROCEDURE — 81001 URINALYSIS AUTO W/SCOPE: CPT

## 2025-05-23 PROCEDURE — 2580000003 HC RX 258

## 2025-05-23 PROCEDURE — G0378 HOSPITAL OBSERVATION PER HR: HCPCS

## 2025-05-23 PROCEDURE — 6360000002 HC RX W HCPCS

## 2025-05-23 PROCEDURE — 6360000002 HC RX W HCPCS: Performed by: STUDENT IN AN ORGANIZED HEALTH CARE EDUCATION/TRAINING PROGRAM

## 2025-05-23 PROCEDURE — 2580000003 HC RX 258: Performed by: EMERGENCY MEDICINE

## 2025-05-23 PROCEDURE — 6370000000 HC RX 637 (ALT 250 FOR IP): Performed by: HOSPITALIST

## 2025-05-23 PROCEDURE — 82800 BLOOD PH: CPT

## 2025-05-23 PROCEDURE — 87086 URINE CULTURE/COLONY COUNT: CPT

## 2025-05-23 PROCEDURE — 99223 1ST HOSP IP/OBS HIGH 75: CPT | Performed by: HOSPITALIST

## 2025-05-23 PROCEDURE — 80307 DRUG TEST PRSMV CHEM ANLYZR: CPT

## 2025-05-23 PROCEDURE — 85025 COMPLETE CBC W/AUTO DIFF WBC: CPT

## 2025-05-23 PROCEDURE — 6360000004 HC RX CONTRAST MEDICATION: Performed by: RADIOLOGY

## 2025-05-23 PROCEDURE — 74177 CT ABD & PELVIS W/CONTRAST: CPT

## 2025-05-23 PROCEDURE — 96361 HYDRATE IV INFUSION ADD-ON: CPT

## 2025-05-23 PROCEDURE — 2580000003 HC RX 258: Performed by: HOSPITALIST

## 2025-05-23 PROCEDURE — 6360000002 HC RX W HCPCS: Performed by: HOSPITALIST

## 2025-05-23 PROCEDURE — 2500000003 HC RX 250 WO HCPCS: Performed by: HOSPITALIST

## 2025-05-23 RX ORDER — METOPROLOL TARTRATE 50 MG
50 TABLET ORAL ONCE
Status: COMPLETED | OUTPATIENT
Start: 2025-05-23 | End: 2025-05-23

## 2025-05-23 RX ORDER — SODIUM CHLORIDE 0.9 % (FLUSH) 0.9 %
5-40 SYRINGE (ML) INJECTION EVERY 12 HOURS SCHEDULED
Status: DISCONTINUED | OUTPATIENT
Start: 2025-05-23 | End: 2025-05-25 | Stop reason: HOSPADM

## 2025-05-23 RX ORDER — ONDANSETRON 2 MG/ML
4 INJECTION INTRAMUSCULAR; INTRAVENOUS ONCE
Status: COMPLETED | OUTPATIENT
Start: 2025-05-23 | End: 2025-05-23

## 2025-05-23 RX ORDER — DEXTROSE MONOHYDRATE 100 MG/ML
INJECTION, SOLUTION INTRAVENOUS CONTINUOUS PRN
Status: DISCONTINUED | OUTPATIENT
Start: 2025-05-23 | End: 2025-05-25 | Stop reason: HOSPADM

## 2025-05-23 RX ORDER — DICYCLOMINE HYDROCHLORIDE 10 MG/1
20 CAPSULE ORAL ONCE
Status: COMPLETED | OUTPATIENT
Start: 2025-05-23 | End: 2025-05-23

## 2025-05-23 RX ORDER — SODIUM CHLORIDE 9 MG/ML
INJECTION, SOLUTION INTRAVENOUS CONTINUOUS
Status: ACTIVE | OUTPATIENT
Start: 2025-05-23 | End: 2025-05-24

## 2025-05-23 RX ORDER — POLYETHYLENE GLYCOL 3350 17 G/17G
17 POWDER, FOR SOLUTION ORAL DAILY PRN
Status: DISCONTINUED | OUTPATIENT
Start: 2025-05-23 | End: 2025-05-25 | Stop reason: HOSPADM

## 2025-05-23 RX ORDER — POTASSIUM CHLORIDE 7.45 MG/ML
10 INJECTION INTRAVENOUS PRN
Status: DISCONTINUED | OUTPATIENT
Start: 2025-05-23 | End: 2025-05-25 | Stop reason: HOSPADM

## 2025-05-23 RX ORDER — SODIUM CHLORIDE 9 MG/ML
INJECTION, SOLUTION INTRAVENOUS PRN
Status: DISCONTINUED | OUTPATIENT
Start: 2025-05-23 | End: 2025-05-25 | Stop reason: HOSPADM

## 2025-05-23 RX ORDER — POLYETHYLENE GLYCOL 3350 17 G/17G
17 POWDER, FOR SOLUTION ORAL DAILY PRN
Qty: 510 G | Refills: 0 | Status: ON HOLD | OUTPATIENT
Start: 2025-05-23 | End: 2025-06-22

## 2025-05-23 RX ORDER — ONDANSETRON 4 MG/1
4 TABLET, FILM COATED ORAL 3 TIMES DAILY PRN
Qty: 15 TABLET | Refills: 0 | Status: ON HOLD | OUTPATIENT
Start: 2025-05-23

## 2025-05-23 RX ORDER — MAGNESIUM SULFATE IN WATER 40 MG/ML
2000 INJECTION, SOLUTION INTRAVENOUS PRN
Status: DISCONTINUED | OUTPATIENT
Start: 2025-05-23 | End: 2025-05-25 | Stop reason: HOSPADM

## 2025-05-23 RX ORDER — KETOROLAC TROMETHAMINE 15 MG/ML
15 INJECTION, SOLUTION INTRAMUSCULAR; INTRAVENOUS ONCE
Status: DISCONTINUED | OUTPATIENT
Start: 2025-05-23 | End: 2025-05-23 | Stop reason: HOSPADM

## 2025-05-23 RX ORDER — GLUCAGON 1 MG/ML
1 KIT INJECTION PRN
Status: DISCONTINUED | OUTPATIENT
Start: 2025-05-23 | End: 2025-05-25 | Stop reason: HOSPADM

## 2025-05-23 RX ORDER — MORPHINE SULFATE 4 MG/ML
4 INJECTION, SOLUTION INTRAMUSCULAR; INTRAVENOUS ONCE
Status: COMPLETED | OUTPATIENT
Start: 2025-05-23 | End: 2025-05-23

## 2025-05-23 RX ORDER — ACETAMINOPHEN 650 MG/1
650 SUPPOSITORY RECTAL EVERY 6 HOURS PRN
Status: DISCONTINUED | OUTPATIENT
Start: 2025-05-23 | End: 2025-05-25 | Stop reason: HOSPADM

## 2025-05-23 RX ORDER — IBUPROFEN 600 MG/1
600 TABLET, FILM COATED ORAL 4 TIMES DAILY PRN
Status: DISCONTINUED | OUTPATIENT
Start: 2025-05-23 | End: 2025-05-25 | Stop reason: HOSPADM

## 2025-05-23 RX ORDER — ONDANSETRON 4 MG/1
4 TABLET, ORALLY DISINTEGRATING ORAL EVERY 8 HOURS PRN
Status: DISCONTINUED | OUTPATIENT
Start: 2025-05-23 | End: 2025-05-25 | Stop reason: HOSPADM

## 2025-05-23 RX ORDER — IOPAMIDOL 755 MG/ML
75 INJECTION, SOLUTION INTRAVASCULAR
Status: COMPLETED | OUTPATIENT
Start: 2025-05-23 | End: 2025-05-23

## 2025-05-23 RX ORDER — 0.9 % SODIUM CHLORIDE 0.9 %
1000 INTRAVENOUS SOLUTION INTRAVENOUS ONCE
Status: COMPLETED | OUTPATIENT
Start: 2025-05-23 | End: 2025-05-23

## 2025-05-23 RX ORDER — LISINOPRIL 10 MG/1
10 TABLET ORAL DAILY
Status: DISCONTINUED | OUTPATIENT
Start: 2025-05-24 | End: 2025-05-25 | Stop reason: HOSPADM

## 2025-05-23 RX ORDER — POTASSIUM CHLORIDE 1500 MG/1
40 TABLET, EXTENDED RELEASE ORAL PRN
Status: DISCONTINUED | OUTPATIENT
Start: 2025-05-23 | End: 2025-05-25 | Stop reason: HOSPADM

## 2025-05-23 RX ORDER — MORPHINE SULFATE 2 MG/ML
2 INJECTION, SOLUTION INTRAMUSCULAR; INTRAVENOUS ONCE
Status: COMPLETED | OUTPATIENT
Start: 2025-05-23 | End: 2025-05-23

## 2025-05-23 RX ORDER — ACETAMINOPHEN 325 MG/1
650 TABLET ORAL EVERY 6 HOURS PRN
Status: DISCONTINUED | OUTPATIENT
Start: 2025-05-23 | End: 2025-05-25 | Stop reason: HOSPADM

## 2025-05-23 RX ORDER — ONDANSETRON 2 MG/ML
4 INJECTION INTRAMUSCULAR; INTRAVENOUS EVERY 6 HOURS PRN
Status: DISCONTINUED | OUTPATIENT
Start: 2025-05-23 | End: 2025-05-23

## 2025-05-23 RX ORDER — DROPERIDOL 2.5 MG/ML
1.25 INJECTION, SOLUTION INTRAMUSCULAR; INTRAVENOUS ONCE
Status: COMPLETED | OUTPATIENT
Start: 2025-05-23 | End: 2025-05-23

## 2025-05-23 RX ORDER — ENOXAPARIN SODIUM 100 MG/ML
40 INJECTION SUBCUTANEOUS DAILY
Status: DISCONTINUED | OUTPATIENT
Start: 2025-05-24 | End: 2025-05-25 | Stop reason: HOSPADM

## 2025-05-23 RX ORDER — INSULIN LISPRO 100 [IU]/ML
0-8 INJECTION, SOLUTION INTRAVENOUS; SUBCUTANEOUS
Status: DISCONTINUED | OUTPATIENT
Start: 2025-05-23 | End: 2025-05-25 | Stop reason: HOSPADM

## 2025-05-23 RX ORDER — ZOLPIDEM TARTRATE 5 MG/1
10 TABLET ORAL NIGHTLY
Status: DISCONTINUED | OUTPATIENT
Start: 2025-05-24 | End: 2025-05-25 | Stop reason: HOSPADM

## 2025-05-23 RX ORDER — ONDANSETRON 2 MG/ML
4 INJECTION INTRAMUSCULAR; INTRAVENOUS EVERY 6 HOURS PRN
Status: DISCONTINUED | OUTPATIENT
Start: 2025-05-23 | End: 2025-05-25 | Stop reason: HOSPADM

## 2025-05-23 RX ORDER — METOPROLOL TARTRATE 50 MG
50 TABLET ORAL 2 TIMES DAILY
Status: DISCONTINUED | OUTPATIENT
Start: 2025-05-23 | End: 2025-05-25 | Stop reason: HOSPADM

## 2025-05-23 RX ORDER — SODIUM CHLORIDE 0.9 % (FLUSH) 0.9 %
5-40 SYRINGE (ML) INJECTION PRN
Status: DISCONTINUED | OUTPATIENT
Start: 2025-05-23 | End: 2025-05-25 | Stop reason: HOSPADM

## 2025-05-23 RX ORDER — KETOROLAC TROMETHAMINE 15 MG/ML
15 INJECTION, SOLUTION INTRAMUSCULAR; INTRAVENOUS ONCE
Status: COMPLETED | OUTPATIENT
Start: 2025-05-23 | End: 2025-05-23

## 2025-05-23 RX ADMIN — SODIUM CHLORIDE: 0.9 INJECTION, SOLUTION INTRAVENOUS at 22:28

## 2025-05-23 RX ADMIN — MORPHINE SULFATE 4 MG: 4 INJECTION, SOLUTION INTRAMUSCULAR; INTRAVENOUS at 12:21

## 2025-05-23 RX ADMIN — IOPAMIDOL 75 ML: 755 INJECTION, SOLUTION INTRAVENOUS at 02:10

## 2025-05-23 RX ADMIN — POLYETHYLENE GLYCOL 3350 17 G: 17 POWDER, FOR SOLUTION ORAL at 22:08

## 2025-05-23 RX ADMIN — MORPHINE SULFATE 2 MG: 2 INJECTION, SOLUTION INTRAMUSCULAR; INTRAVENOUS at 22:47

## 2025-05-23 RX ADMIN — SODIUM CHLORIDE, PRESERVATIVE FREE 10 ML: 5 INJECTION INTRAVENOUS at 22:30

## 2025-05-23 RX ADMIN — SODIUM CHLORIDE 1000 ML: 0.9 INJECTION, SOLUTION INTRAVENOUS at 01:16

## 2025-05-23 RX ADMIN — DICYCLOMINE HYDROCHLORIDE 20 MG: 10 CAPSULE ORAL at 15:48

## 2025-05-23 RX ADMIN — LIDOCAINE HYDROCHLORIDE: 20 SOLUTION ORAL at 22:46

## 2025-05-23 RX ADMIN — ONDANSETRON 4 MG: 2 INJECTION, SOLUTION INTRAMUSCULAR; INTRAVENOUS at 01:18

## 2025-05-23 RX ADMIN — IBUPROFEN 600 MG: 600 TABLET, FILM COATED ORAL at 22:08

## 2025-05-23 RX ADMIN — SODIUM CHLORIDE 1000 ML: 0.9 INJECTION, SOLUTION INTRAVENOUS at 12:04

## 2025-05-23 RX ADMIN — SODIUM CHLORIDE 1000 ML: 0.9 INJECTION, SOLUTION INTRAVENOUS at 17:19

## 2025-05-23 RX ADMIN — DROPERIDOL 1.25 MG: 2.5 INJECTION, SOLUTION INTRAMUSCULAR; INTRAVENOUS at 01:35

## 2025-05-23 RX ADMIN — KETOROLAC TROMETHAMINE 15 MG: 15 INJECTION, SOLUTION INTRAMUSCULAR; INTRAVENOUS at 03:05

## 2025-05-23 RX ADMIN — METOPROLOL TARTRATE 50 MG: 50 TABLET, FILM COATED ORAL at 22:08

## 2025-05-23 RX ADMIN — MORPHINE SULFATE 4 MG: 4 INJECTION, SOLUTION INTRAMUSCULAR; INTRAVENOUS at 17:18

## 2025-05-23 RX ADMIN — METOPROLOL TARTRATE 50 MG: 50 TABLET, FILM COATED ORAL at 04:39

## 2025-05-23 ASSESSMENT — PAIN SCALES - GENERAL
PAINLEVEL_OUTOF10: 10
PAINLEVEL_OUTOF10: 9
PAINLEVEL_OUTOF10: 9
PAINLEVEL_OUTOF10: 8
PAINLEVEL_OUTOF10: 10
PAINLEVEL_OUTOF10: 8

## 2025-05-23 ASSESSMENT — LIFESTYLE VARIABLES
HOW OFTEN DO YOU HAVE A DRINK CONTAINING ALCOHOL: NEVER
HOW MANY STANDARD DRINKS CONTAINING ALCOHOL DO YOU HAVE ON A TYPICAL DAY: PATIENT DOES NOT DRINK
HOW MANY STANDARD DRINKS CONTAINING ALCOHOL DO YOU HAVE ON A TYPICAL DAY: PATIENT DOES NOT DRINK
HOW OFTEN DO YOU HAVE A DRINK CONTAINING ALCOHOL: MONTHLY OR LESS

## 2025-05-23 ASSESSMENT — PAIN DESCRIPTION - LOCATION
LOCATION: ABDOMEN
LOCATION: ABDOMEN
LOCATION: ABDOMEN;BACK

## 2025-05-23 ASSESSMENT — PAIN - FUNCTIONAL ASSESSMENT
PAIN_FUNCTIONAL_ASSESSMENT: PREVENTS OR INTERFERES SOME ACTIVE ACTIVITIES AND ADLS
PAIN_FUNCTIONAL_ASSESSMENT: PREVENTS OR INTERFERES SOME ACTIVE ACTIVITIES AND ADLS
PAIN_FUNCTIONAL_ASSESSMENT: 0-10

## 2025-05-23 ASSESSMENT — PAIN DESCRIPTION - DESCRIPTORS
DESCRIPTORS: ACHING;CRAMPING
DESCRIPTORS: SHARP;STABBING
DESCRIPTORS: ACHING
DESCRIPTORS: STABBING;SHARP
DESCRIPTORS: ACHING

## 2025-05-23 ASSESSMENT — PAIN DESCRIPTION - ORIENTATION
ORIENTATION: MID
ORIENTATION: UPPER
ORIENTATION: MID
ORIENTATION: UPPER

## 2025-05-23 ASSESSMENT — PAIN DESCRIPTION - ONSET: ONSET: SUDDEN

## 2025-05-23 ASSESSMENT — PAIN DESCRIPTION - PAIN TYPE
TYPE: ACUTE PAIN
TYPE: ACUTE PAIN

## 2025-05-23 ASSESSMENT — PAIN DESCRIPTION - FREQUENCY
FREQUENCY: CONTINUOUS
FREQUENCY: CONTINUOUS

## 2025-05-23 NOTE — ED NOTES
ED to Inpatient Handoff Report    Notified joe that electronic handoff available and patient ready for transport to room 729.    Safety Risks: None identified    Patient in Restraints: no    Constant Observer or Patient : no    Telemetry Monitoring Ordered: No          Order to transfer to unit without monitor: NO    Last MEWS: 1 Time completed: 1713    Deterioration Index: 23.11    Vitals:    05/23/25 1107 05/23/25 1711 05/23/25 1713   BP: (!) 196/124  (!) 178/104   Pulse: 100 96 97   Resp: 18 22 21   Temp: 98.4 °F (36.9 °C)     TempSrc: Oral     SpO2: 100%     Weight: 53.5 kg (118 lb)     Height: 1.549 m (5' 1\")         Opportunity for questions and clarification was provided.

## 2025-05-23 NOTE — DISCHARGE INSTRUCTIONS
Please call and follow-up with your family doctor.   your Zofran from the pharmacy.  Return to the ED if your symptoms worsen.

## 2025-05-23 NOTE — ED PROVIDER NOTES
HPI:  5/23/25, Time: 12:25 PM EDT         Diana Mann is a 29 y.o. female presenting to the ED for abdominal pain.  Patient was seen in the ED yesterday for the same complaints.  She had a CT abdomen pelvis less than 24 hours ago which was negative for acute findings.  She was discharged home.  She presents today with persistent pain.  Patient is a history of insulin-dependent diabetes.  She denies fevers.  She says she has been having nausea and nonbloody, nonbilious emesis.  She denies diarrhea, constipation, dysuria, cough, chest pain, or shortness of breath.  Denies EtOH use.  Does report a history of marijuana use.    I reviewed the patient's chart.  Patient admitted on 11/20/2024 for DKA.  Chart indicates a history of medication noncompliance.    Review of Systems:  Pertinent positives and negatives as per HPI.    --------------------------------------------- PAST HISTORY ---------------------------------------------  Past Medical History:  has a past medical history of Acidosis, Bleeding at insertion site, Common femoral artery injury, right, initial encounter, COVID-19 virus infection, Depressive disorder, Diabetic ketoacidosis (HCC), DM type 1 (diabetes mellitus, type 1) (HCC), Lactic acid acidosis, Marijuana abuse, Non-compliance, Primary hypertension, Pyelonephritis, and Trichimoniasis.    Past Surgical History:  has a past surgical history that includes Abdomen surgery (N/A, 5/9/2019) and Bartholin gland cyst excision.    Social History:  reports that she has never smoked. She has never been exposed to tobacco smoke. She has never used smokeless tobacco. She reports current drug use. Frequency: 14.00 times per week. Drug: Marijuana (Weed). She reports that she does not drink alcohol.    Family History: family history includes Diabetes in her maternal grandmother and paternal grandmother; Stroke in an other family member.     The patient’s home medications have been reviewed.    Allergies: Patient has

## 2025-05-23 NOTE — ED PROVIDER NOTES
[facility] MED SURG  EMERGENCY DEPARTMENT ENCOUNTER        Pt Name: Diana Mann  MRN: [id number]  Birthdate 1995  Date of evaluation: 5/23/2025  Provider: Keven Murguia DO  PCP: Opal Iyer MD  Note Started: 10:56 AM EDT 5/23/25    CHIEF COMPLAINT       Chief Complaint   Patient presents with    Abdominal Pain     For last 2 days.     Vomiting    Nausea       HISTORY OF PRESENT ILLNESS: 1 or more Elements   History From: Patient    Diana Mann is a 29 y.o. female who presents to the ER from home with chief complaint of abdominal pain.  Patient states she was here yesterday with similar complaints, at that time pain was poorly controlled with Toradol she was sent home.  Patient woke up this morning she had recurrence of the severe abdominal pain.  Pain described as diffuse abdominal pain that radiates to her back.  As a result of the abdominal pain patient is experiencing some nausea some episodes of vomiting described as nonbloody.  Patient says she has not been able to eat anything for the last 2 days because of the severe abdominal pain and nausea patient reports total duration of pain starting about a week ago also noticed around a week ago that she started having difficulty passing stool.  Patient reports has been nearly 4 or 5 days since she is passed a normal regular bowel movement.  Denies any urinary concerns specifically no urinary frequency urgency pain itching burning or blood during urination.  Denies any fevers chills or night sweats however does states she is warm.  Denies any chest pain shortness of breath.    Nursing Notes were all reviewed and agreed with or any disagreements were addressed in the HPI.        REVIEW OF EXTERNAL NOTES :         ER note from 5/22/2025       REVIEW OF SYSTEMS :           Positives and Pertinent negatives as per HPI.     SURGICAL HISTORY     Past Surgical History:   Procedure Laterality Date    ABDOMEN SURGERY N/A 5/9/2019

## 2025-05-23 NOTE — ED PROVIDER NOTES
[facility] EMERGENCY DEPARTMENT  EMERGENCY DEPARTMENT ENCOUNTER        Pt Name: Diana Mann  MRN: [id number]  Birthdate 1995  Date of evaluation: 5/22/2025  Provider: Wanda Castaneda MD  PCP: Opal Iyer MD  Note Started: 2:08 AM EDT 5/23/25    CHIEF COMPLAINT       Chief Complaint   Patient presents with    Abdominal Pain     With constipation.  Went to gastro and was told to increase fiber.       HISTORY OF PRESENT ILLNESS: 1 or more Elements   History From: Patient    Limitations to history : Pt actively vomiting    Diana Mann is a 29 y.o. female who presents for abdominal pain beginning one week ago. Describes intermittent diffuse squeezing pain that radiates into her back. States today she began experiencing nausea and vomiting. Last BM was approximately 5 days ago.  Denies any urinary complaints. Presents for further evaluation. She states her sugars have been well. Pt admits to marijuana use. No alcohol use or other illicit drug use.    Patient denies fever, cough, congestion, headache, chest pain, diarrhea, lightheadedness, dysuria, hematuria, hematochezia, and melena.    Nursing Notes were all reviewed and agreed with or any disagreements were addressed in the HPI.        REVIEW OF EXTERNAL NOTES :         5/14/2025: Pt followed up with PCP for uncontrolled diabetes and hypertension.       REVIEW OF SYSTEMS :      Positives and Pertinent negatives as per HPI.     SURGICAL HISTORY     Past Surgical History:   Procedure Laterality Date    ABDOMEN SURGERY N/A 5/9/2019    INCISION AND DRAINAGE OF SUPRAPUBIC ABSESS performed by Keaton JUAREZ MD at [facility] OR    BARTHOLIN GLAND CYST EXCISION      last yr       CURRENTMEDICATIONS       Discharge Medication List as of 5/23/2025  4:42 AM        CONTINUE these medications which have NOT CHANGED    Details   insulin lispro (HUMALOG,ADMELOG) 100 UNIT/ML SOLN injection vial Inject into the skin 3 times daily

## 2025-05-24 ENCOUNTER — APPOINTMENT (OUTPATIENT)
Dept: MRI IMAGING | Age: 30
End: 2025-05-24
Payer: COMMERCIAL

## 2025-05-24 PROBLEM — E10.65 TYPE 1 DIABETES MELLITUS WITH HYPERGLYCEMIA (HCC): Status: ACTIVE | Noted: 2018-11-12

## 2025-05-24 LAB
AMMONIA PLAS-SCNC: 14 UMOL/L (ref 11–51)
ANION GAP SERPL CALCULATED.3IONS-SCNC: 11 MMOL/L (ref 7–16)
BASOPHILS # BLD: 0.07 K/UL (ref 0–0.2)
BASOPHILS NFR BLD: 1 % (ref 0–2)
BUN SERPL-MCNC: 8 MG/DL (ref 6–20)
CALCIUM SERPL-MCNC: 8.2 MG/DL (ref 8.6–10.2)
CHLORIDE SERPL-SCNC: 108 MMOL/L (ref 98–107)
CO2 SERPL-SCNC: 21 MMOL/L (ref 22–29)
CREAT SERPL-MCNC: 0.7 MG/DL (ref 0.5–1)
EKG ATRIAL RATE: 96 BPM
EKG P AXIS: 75 DEGREES
EKG P-R INTERVAL: 162 MS
EKG Q-T INTERVAL: 358 MS
EKG QRS DURATION: 80 MS
EKG QTC CALCULATION (BAZETT): 452 MS
EKG R AXIS: 63 DEGREES
EKG T AXIS: 45 DEGREES
EKG VENTRICULAR RATE: 96 BPM
EOSINOPHIL # BLD: 0.1 K/UL (ref 0.05–0.5)
EOSINOPHILS RELATIVE PERCENT: 1 % (ref 0–6)
ERYTHROCYTE [DISTWIDTH] IN BLOOD BY AUTOMATED COUNT: 13.9 % (ref 11.5–15)
GFR, ESTIMATED: >90 ML/MIN/1.73M2
GLUCOSE BLD-MCNC: 128 MG/DL (ref 74–99)
GLUCOSE BLD-MCNC: 192 MG/DL (ref 74–99)
GLUCOSE BLD-MCNC: 227 MG/DL (ref 74–99)
GLUCOSE BLD-MCNC: 77 MG/DL (ref 74–99)
GLUCOSE BLD-MCNC: 81 MG/DL (ref 74–99)
GLUCOSE SERPL-MCNC: 82 MG/DL (ref 74–99)
HAV IGM SERPL QL IA: NONREACTIVE
HBA1C MFR BLD: 13.3 % (ref 4–5.6)
HBV CORE IGM SERPL QL IA: NONREACTIVE
HBV SURFACE AG SERPL QL IA: NONREACTIVE
HCT VFR BLD AUTO: 29.4 % (ref 34–48)
HCV AB SERPL QL IA: NONREACTIVE
HETEROPH AB BLD QL IA: NEGATIVE
HGB BLD-MCNC: 9.5 G/DL (ref 11.5–15.5)
IMM GRANULOCYTES # BLD AUTO: <0.03 K/UL (ref 0–0.58)
IMM GRANULOCYTES NFR BLD: 0 % (ref 0–5)
LYMPHOCYTES NFR BLD: 3.22 K/UL (ref 1.5–4)
LYMPHOCYTES RELATIVE PERCENT: 35 % (ref 20–42)
MAGNESIUM SERPL-MCNC: 1.9 MG/DL (ref 1.6–2.6)
MCH RBC QN AUTO: 28.1 PG (ref 26–35)
MCHC RBC AUTO-ENTMCNC: 32.3 G/DL (ref 32–34.5)
MCV RBC AUTO: 87 FL (ref 80–99.9)
MICROORGANISM SPEC CULT: ABNORMAL
MICROORGANISM SPEC CULT: ABNORMAL
MONOCYTES NFR BLD: 0.66 K/UL (ref 0.1–0.95)
MONOCYTES NFR BLD: 7 % (ref 2–12)
NEUTROPHILS NFR BLD: 56 % (ref 43–80)
NEUTS SEG NFR BLD: 5.19 K/UL (ref 1.8–7.3)
PLATELET # BLD AUTO: 209 K/UL (ref 130–450)
PMV BLD AUTO: 10.5 FL (ref 7–12)
POTASSIUM SERPL-SCNC: 3.3 MMOL/L (ref 3.5–5)
RBC # BLD AUTO: 3.38 M/UL (ref 3.5–5.5)
SERVICE CMNT-IMP: ABNORMAL
SODIUM SERPL-SCNC: 140 MMOL/L (ref 132–146)
SPECIMEN DESCRIPTION: ABNORMAL
WBC OTHER # BLD: 9.3 K/UL (ref 4.5–11.5)

## 2025-05-24 PROCEDURE — 36415 COLL VENOUS BLD VENIPUNCTURE: CPT

## 2025-05-24 PROCEDURE — 99232 SBSQ HOSP IP/OBS MODERATE 35: CPT | Performed by: STUDENT IN AN ORGANIZED HEALTH CARE EDUCATION/TRAINING PROGRAM

## 2025-05-24 PROCEDURE — 83735 ASSAY OF MAGNESIUM: CPT

## 2025-05-24 PROCEDURE — 84478 ASSAY OF TRIGLYCERIDES: CPT

## 2025-05-24 PROCEDURE — 96361 HYDRATE IV INFUSION ADD-ON: CPT

## 2025-05-24 PROCEDURE — 6370000000 HC RX 637 (ALT 250 FOR IP)

## 2025-05-24 PROCEDURE — 82140 ASSAY OF AMMONIA: CPT

## 2025-05-24 PROCEDURE — 82962 GLUCOSE BLOOD TEST: CPT

## 2025-05-24 PROCEDURE — A9579 GAD-BASE MR CONTRAST NOS,1ML: HCPCS | Performed by: RADIOLOGY

## 2025-05-24 PROCEDURE — 74183 MRI ABD W/O CNTR FLWD CNTR: CPT

## 2025-05-24 PROCEDURE — 86644 CMV ANTIBODY: CPT

## 2025-05-24 PROCEDURE — 85025 COMPLETE CBC W/AUTO DIFF WBC: CPT

## 2025-05-24 PROCEDURE — 6360000004 HC RX CONTRAST MEDICATION: Performed by: RADIOLOGY

## 2025-05-24 PROCEDURE — 82977 ASSAY OF GGT: CPT

## 2025-05-24 PROCEDURE — 82465 ASSAY BLD/SERUM CHOLESTEROL: CPT

## 2025-05-24 PROCEDURE — 80074 ACUTE HEPATITIS PANEL: CPT

## 2025-05-24 PROCEDURE — 82103 ALPHA-1-ANTITRYPSIN TOTAL: CPT

## 2025-05-24 PROCEDURE — 6370000000 HC RX 637 (ALT 250 FOR IP): Performed by: HOSPITALIST

## 2025-05-24 PROCEDURE — 2580000003 HC RX 258: Performed by: HOSPITALIST

## 2025-05-24 PROCEDURE — 93010 ELECTROCARDIOGRAM REPORT: CPT | Performed by: INTERNAL MEDICINE

## 2025-05-24 PROCEDURE — 6360000002 HC RX W HCPCS: Performed by: NURSE PRACTITIONER

## 2025-05-24 PROCEDURE — G0378 HOSPITAL OBSERVATION PER HR: HCPCS

## 2025-05-24 PROCEDURE — 6370000000 HC RX 637 (ALT 250 FOR IP): Performed by: STUDENT IN AN ORGANIZED HEALTH CARE EDUCATION/TRAINING PROGRAM

## 2025-05-24 PROCEDURE — 84460 ALANINE AMINO (ALT) (SGPT): CPT

## 2025-05-24 PROCEDURE — 83036 HEMOGLOBIN GLYCOSYLATED A1C: CPT

## 2025-05-24 PROCEDURE — 82172 ASSAY OF APOLIPOPROTEIN: CPT

## 2025-05-24 PROCEDURE — 86039 ANTINUCLEAR ANTIBODIES (ANA): CPT

## 2025-05-24 PROCEDURE — 80048 BASIC METABOLIC PNL TOTAL CA: CPT

## 2025-05-24 PROCEDURE — 82947 ASSAY GLUCOSE BLOOD QUANT: CPT

## 2025-05-24 PROCEDURE — 83883 ASSAY NEPHELOMETRY NOT SPEC: CPT

## 2025-05-24 PROCEDURE — 86255 FLUORESCENT ANTIBODY SCREEN: CPT

## 2025-05-24 PROCEDURE — 86308 HETEROPHILE ANTIBODY SCREEN: CPT

## 2025-05-24 PROCEDURE — 84450 TRANSFERASE (AST) (SGOT): CPT

## 2025-05-24 PROCEDURE — 82247 BILIRUBIN TOTAL: CPT

## 2025-05-24 PROCEDURE — 86665 EPSTEIN-BARR CAPSID VCA: CPT

## 2025-05-24 PROCEDURE — 83010 ASSAY OF HAPTOGLOBIN QUANT: CPT

## 2025-05-24 PROCEDURE — 86038 ANTINUCLEAR ANTIBODIES: CPT

## 2025-05-24 PROCEDURE — 96376 TX/PRO/DX INJ SAME DRUG ADON: CPT

## 2025-05-24 RX ORDER — LORAZEPAM 1 MG/1
1 TABLET ORAL
Status: DISCONTINUED | OUTPATIENT
Start: 2025-05-24 | End: 2025-05-24

## 2025-05-24 RX ORDER — CYCLOBENZAPRINE HCL 10 MG
5 TABLET ORAL ONCE
Status: COMPLETED | OUTPATIENT
Start: 2025-05-24 | End: 2025-05-24

## 2025-05-24 RX ORDER — MORPHINE SULFATE 2 MG/ML
2 INJECTION, SOLUTION INTRAMUSCULAR; INTRAVENOUS ONCE
Status: COMPLETED | OUTPATIENT
Start: 2025-05-24 | End: 2025-05-24

## 2025-05-24 RX ORDER — KETOROLAC TROMETHAMINE 15 MG/ML
15 INJECTION, SOLUTION INTRAMUSCULAR; INTRAVENOUS ONCE
Status: DISCONTINUED | OUTPATIENT
Start: 2025-05-24 | End: 2025-05-25 | Stop reason: HOSPADM

## 2025-05-24 RX ORDER — OXYCODONE HYDROCHLORIDE 5 MG/1
5 TABLET ORAL ONCE
Refills: 0 | Status: COMPLETED | OUTPATIENT
Start: 2025-05-24 | End: 2025-05-24

## 2025-05-24 RX ORDER — INSULIN GLARGINE 100 [IU]/ML
12 INJECTION, SOLUTION SUBCUTANEOUS NIGHTLY
Status: DISCONTINUED | OUTPATIENT
Start: 2025-05-24 | End: 2025-05-25 | Stop reason: HOSPADM

## 2025-05-24 RX ADMIN — INSULIN LISPRO 2 UNITS: 100 INJECTION, SOLUTION INTRAVENOUS; SUBCUTANEOUS at 20:27

## 2025-05-24 RX ADMIN — OXYCODONE 5 MG: 5 TABLET ORAL at 15:00

## 2025-05-24 RX ADMIN — CYCLOBENZAPRINE 5 MG: 10 TABLET, FILM COATED ORAL at 12:21

## 2025-05-24 RX ADMIN — INSULIN GLARGINE 12 UNITS: 100 INJECTION, SOLUTION SUBCUTANEOUS at 17:51

## 2025-05-24 RX ADMIN — INSULIN LISPRO 2 UNITS: 100 INJECTION, SOLUTION INTRAVENOUS; SUBCUTANEOUS at 17:50

## 2025-05-24 RX ADMIN — METOPROLOL TARTRATE 50 MG: 50 TABLET, FILM COATED ORAL at 09:18

## 2025-05-24 RX ADMIN — ZOLPIDEM TARTRATE 10 MG: 5 TABLET ORAL at 20:25

## 2025-05-24 RX ADMIN — METOPROLOL TARTRATE 50 MG: 50 TABLET, FILM COATED ORAL at 20:25

## 2025-05-24 RX ADMIN — LISINOPRIL 10 MG: 10 TABLET ORAL at 09:18

## 2025-05-24 RX ADMIN — MORPHINE SULFATE 2 MG: 2 INJECTION, SOLUTION INTRAMUSCULAR; INTRAVENOUS at 02:21

## 2025-05-24 RX ADMIN — GADOTERIDOL 11 ML: 279.3 INJECTION, SOLUTION INTRAVENOUS at 13:37

## 2025-05-24 RX ADMIN — IBUPROFEN 600 MG: 600 TABLET, FILM COATED ORAL at 09:17

## 2025-05-24 RX ADMIN — SODIUM CHLORIDE: 0.9 INJECTION, SOLUTION INTRAVENOUS at 09:15

## 2025-05-24 RX ADMIN — OXYCODONE 5 MG: 5 TABLET ORAL at 20:25

## 2025-05-24 RX ADMIN — POTASSIUM CHLORIDE 40 MEQ: 1500 TABLET, EXTENDED RELEASE ORAL at 09:17

## 2025-05-24 ASSESSMENT — PAIN DESCRIPTION - LOCATION
LOCATION: ABDOMEN;BACK
LOCATION: ABDOMEN;BACK;FLANK
LOCATION: ABDOMEN;BACK

## 2025-05-24 ASSESSMENT — PAIN SCALES - GENERAL
PAINLEVEL_OUTOF10: 10
PAINLEVEL_OUTOF10: 9
PAINLEVEL_OUTOF10: 10

## 2025-05-24 ASSESSMENT — PAIN DESCRIPTION - ORIENTATION
ORIENTATION: LEFT;RIGHT
ORIENTATION: LOWER

## 2025-05-24 ASSESSMENT — PAIN DESCRIPTION - DESCRIPTORS
DESCRIPTORS: CRAMPING;SHARP
DESCRIPTORS: ACHING;SORE
DESCRIPTORS: SHARP;DISCOMFORT
DESCRIPTORS: ACHING;STABBING

## 2025-05-24 NOTE — H&P
Hospital Medicine History & Physical      PCP: Opal Iyer MD    Date of Admission: 5/23/2025    Date of Service: Pt seen/examined on 5/23/2025 and is Placed in Observation.    Chief Complaint:  had concerns including Abdominal Pain (For last 2 days. ), Vomiting, and Nausea.    History Of Present Illness:    Ms. Diana Mann, a 29 y.o. year old female  with PMH of T1DM, HTN, depression disorder, insomnia    Pt presented to ED for evaluation of intractable nausea and vomiting.  CT abdomen showed no acute distress, ultrasound gallbladder no acute gallbladder abnormality.  Patient was seen on room air, no acute respite distress, denies fever, chills, cough, shortness breath, denies chest pain, denies diarrhea.  Patient does have nausea and abdominal pain, feels like abdominal cramps.    She states she started insulin pump about a month ago, diabetes control has been significant improved.      I have personally reviewed and interpreted the Initial workups as summarized below:     CBC unremarkable  Renal function stable and baseline no acute electrolytes problem, anion gap 17,  Hepatic function   stable at baseline, normal lipase  Hemoglobin A1c was 13% in February 2025    Urinalysis no evidence of UTI    EKG reviewed normal sinus rhythm with no acute ST/T wave ischemic change.       Ultrasound gallbladder  IMPRESSION:  1.  Limited exam secondary to patient's inability to hold still secondary to  extreme pain.     2.  Diffusely increased echogenicity of the liver is suggestive of an  underlying infiltrative pathology the most common of which is hepatic  steatosis.  Please consider correlation with patient's liver function test.      Past Medical History:        Diagnosis Date    Acidosis     Bleeding at insertion site 01/05/2018    Common femoral artery injury, right, initial encounter 01/05/2018    COVID-19 virus infection     Depressive disorder     Diabetic ketoacidosis (HCC)     DM type 1 (diabetes

## 2025-05-24 NOTE — PROGRESS NOTES
4 Eyes Skin Assessment     NAME:  Diana Mann  YOB: 1995  MEDICAL RECORD NUMBER:  [id number]    The patient is being assessed for  Admission    I agree that at least one RN has performed a thorough Head to Toe Skin Assessment on the patient. ALL assessment sites listed below have been assessed.      Areas assessed by both nurses:    Head, Face, Ears, Shoulders, Back, Chest, Arms, Elbows, Hands, Sacrum. Buttock, Coccyx, Ischium, Legs. Feet and Heels, and Under Medical Devices         Does the Patient have a Wound? No noted wound(s)       Gavino Prevention initiated by RN: No  Wound Care Orders initiated by RN: No    Pressure Injury (Stage 3,4, Unstageable, DTI, NWPT, and Complex wounds) if present, place Wound referral order by RN under : No    New Ostomies, if present place, Ostomy referral order under : No     Nurse 1 eSignature: Electronically signed by Sheila Tovar RN on 5/24/25 at 4:59 AM EDT    **SHARE this note so that the co-signing nurse can place an eSignature**    Nurse 2 eSignature: Electronically signed by Susan Sparks RN on 5/24/25 at 5:04 AM EDT

## 2025-05-24 NOTE — PROGRESS NOTES
Notified Dr. Akers that patient states toradol does not work for her and she does not want to take. No new orders at this time.    Order received for flexeril 5mg PO; see MAR.

## 2025-05-24 NOTE — PROGRESS NOTES
[facility] Hospitalist Progress Note    Admitting Date and Time: 5/23/2025 10:54 AM  Admit Dx: Nausea & vomiting [R11.2]    Subjective:  Patient is being followed for Nausea & vomiting [R11.2]   Pt feels a little better  Per RN: no additional concerns    ROS: denies fever, chills, cp, sob, n/v, HA unless otherwise noted above     lisinopril  10 mg Oral Daily    metoprolol tartrate  50 mg Oral BID    zolpidem  10 mg Oral Nightly    sodium chloride flush  5-40 mL IntraVENous 2 times per day    enoxaparin  40 mg SubCUTAneous Daily    insulin lispro  0-8 Units SubCUTAneous 4x Daily AC & HS    Insulin Pump - Bolus Dose   SubCUTAneous 4x Daily AC & HS    Insulin Pump - Basal Dose   SubCUTAneous Daily     ibuprofen, 600 mg, 4x Daily PRN  polyethylene glycol, 17 g, Daily PRN  sodium chloride flush, 5-40 mL, PRN  sodium chloride, , PRN  potassium chloride, 40 mEq, PRN   Or  potassium alternative oral replacement, 40 mEq, PRN   Or  potassium chloride, 10 mEq, PRN  magnesium sulfate, 2,000 mg, PRN  ondansetron, 4 mg, Q8H PRN   Or  ondansetron, 4 mg, Q6H PRN  polyethylene glycol, 17 g, Daily PRN  acetaminophen, 650 mg, Q6H PRN   Or  acetaminophen, 650 mg, Q6H PRN  glucose, 4 tablet, PRN  dextrose bolus, 125 mL, PRN   Or  dextrose bolus, 250 mL, PRN  glucagon (rDNA), 1 mg, PRN  dextrose, , Continuous PRN  glucose, 4 tablet, PRN  dextrose bolus, 125 mL, PRN   Or  dextrose bolus, 250 mL, PRN  glucagon (rDNA), 1 mg, PRN  dextrose, , Continuous PRN         Objective:  BP (!) 134/101   Pulse 81   Temp 98.2 °F (36.8 °C) (Oral)   Resp 16   Ht 1.549 m (5' 1\")   Wt 53.6 kg (118 lb 2.7 oz)   LMP 05/12/2025   SpO2 100%   BMI 22.33 kg/m²     General Appearance: alert and oriented to person, place and time and in NAD, laying in bed  Skin: warm and dry  Head: normocephalic and atraumatic  Eyes: PERRL, EOMI, conjunctivae normal  Neck: neck supple, trachea midline   Pulmonary/Chest: CTAB, no w/r/r, normal air movement, no

## 2025-05-24 NOTE — PROGRESS NOTES
Updated Gilda Corea NP via perfect serve to patients continued c/o abdominal pain and pt's request for more pain medication at this time. Per Gilda's response Give 2mg IV Morphine Once.

## 2025-05-24 NOTE — PLAN OF CARE
Problem: Chronic Conditions and Co-morbidities  Goal: Patient's chronic conditions and co-morbidity symptoms are monitored and maintained or improved  Outcome: Progressing     Problem: Discharge Planning  Goal: Discharge to home or other facility with appropriate resources  Outcome: Progressing  Flowsheets (Taken 5/23/2025 2104)  Discharge to home or other facility with appropriate resources:   Identify barriers to discharge with patient and caregiver   Identify discharge learning needs (meds, wound care, etc)   Refer to discharge planning if patient needs post-hospital services based on physician order or complex needs related to functional status, cognitive ability or social support system   Arrange for needed discharge resources and transportation as appropriate     Problem: Pain  Goal: Verbalizes/displays adequate comfort level or baseline comfort level  Outcome: Progressing

## 2025-05-25 VITALS
SYSTOLIC BLOOD PRESSURE: 150 MMHG | BODY MASS INDEX: 22.31 KG/M2 | WEIGHT: 118.17 LBS | RESPIRATION RATE: 16 BRPM | HEART RATE: 92 BPM | OXYGEN SATURATION: 100 % | HEIGHT: 61 IN | DIASTOLIC BLOOD PRESSURE: 110 MMHG | TEMPERATURE: 97.9 F

## 2025-05-25 LAB
AMPHET UR QL SCN: NEGATIVE
ANION GAP SERPL CALCULATED.3IONS-SCNC: 11 MMOL/L (ref 7–16)
B-OH-BUTYR SERPL-MCNC: 2.16 MMOL/L (ref 0.02–0.27)
BARBITURATES UR QL SCN: NEGATIVE
BASOPHILS # BLD: 0.04 K/UL (ref 0–0.2)
BASOPHILS NFR BLD: 1 % (ref 0–2)
BENZODIAZ UR QL: NEGATIVE
BUN SERPL-MCNC: 10 MG/DL (ref 6–20)
BUPRENORPHINE UR QL: NEGATIVE
CALCIUM SERPL-MCNC: 8.5 MG/DL (ref 8.6–10.2)
CANNABINOIDS UR QL SCN: POSITIVE
CHLORIDE SERPL-SCNC: 103 MMOL/L (ref 98–107)
CHOLEST SERPL-MCNC: 175 MG/DL
CO2 SERPL-SCNC: 19 MMOL/L (ref 22–29)
COCAINE UR QL SCN: NEGATIVE
CREAT SERPL-MCNC: 0.8 MG/DL (ref 0.5–1)
EOSINOPHIL # BLD: 0.08 K/UL (ref 0.05–0.5)
EOSINOPHILS RELATIVE PERCENT: 1 % (ref 0–6)
ERYTHROCYTE [DISTWIDTH] IN BLOOD BY AUTOMATED COUNT: 13.3 % (ref 11.5–15)
FENTANYL UR QL: NEGATIVE
GFR, ESTIMATED: >90 ML/MIN/1.73M2
GLUCOSE BLD-MCNC: 146 MG/DL (ref 74–99)
GLUCOSE BLD-MCNC: 147 MG/DL (ref 74–99)
GLUCOSE BLD-MCNC: 97 MG/DL (ref 74–99)
GLUCOSE SERPL-MCNC: 125 MG/DL (ref 74–99)
HCT VFR BLD AUTO: 30.4 % (ref 34–48)
HDLC SERPL-MCNC: 58 MG/DL
HGB BLD-MCNC: 10.2 G/DL (ref 11.5–15.5)
IMM GRANULOCYTES # BLD AUTO: <0.03 K/UL (ref 0–0.58)
IMM GRANULOCYTES NFR BLD: 0 % (ref 0–5)
LDLC SERPL CALC-MCNC: 86 MG/DL
LYMPHOCYTES NFR BLD: 2.88 K/UL (ref 1.5–4)
LYMPHOCYTES RELATIVE PERCENT: 38 % (ref 20–42)
MCH RBC QN AUTO: 28.1 PG (ref 26–35)
MCHC RBC AUTO-ENTMCNC: 33.6 G/DL (ref 32–34.5)
MCV RBC AUTO: 83.7 FL (ref 80–99.9)
METHADONE UR QL: NEGATIVE
MONOCYTES NFR BLD: 0.4 K/UL (ref 0.1–0.95)
MONOCYTES NFR BLD: 5 % (ref 2–12)
NEUTROPHILS NFR BLD: 54 % (ref 43–80)
NEUTS SEG NFR BLD: 4.08 K/UL (ref 1.8–7.3)
OPIATES UR QL SCN: POSITIVE
OXYCODONE UR QL SCN: NEGATIVE
PCP UR QL SCN: NEGATIVE
PH VENOUS: 7.36 (ref 7.35–7.45)
PLATELET # BLD AUTO: 210 K/UL (ref 130–450)
PMV BLD AUTO: 10.1 FL (ref 7–12)
POTASSIUM SERPL-SCNC: 3.8 MMOL/L (ref 3.5–5)
RBC # BLD AUTO: 3.63 M/UL (ref 3.5–5.5)
SODIUM SERPL-SCNC: 133 MMOL/L (ref 132–146)
TEST INFORMATION: ABNORMAL
TRIGL SERPL-MCNC: 153 MG/DL
VLDLC SERPL CALC-MCNC: 31 MG/DL
WBC OTHER # BLD: 7.5 K/UL (ref 4.5–11.5)

## 2025-05-25 PROCEDURE — 99239 HOSP IP/OBS DSCHRG MGMT >30: CPT | Performed by: STUDENT IN AN ORGANIZED HEALTH CARE EDUCATION/TRAINING PROGRAM

## 2025-05-25 PROCEDURE — 80061 LIPID PANEL: CPT

## 2025-05-25 PROCEDURE — 36415 COLL VENOUS BLD VENIPUNCTURE: CPT

## 2025-05-25 PROCEDURE — 82962 GLUCOSE BLOOD TEST: CPT

## 2025-05-25 PROCEDURE — 6370000000 HC RX 637 (ALT 250 FOR IP)

## 2025-05-25 PROCEDURE — 85025 COMPLETE CBC W/AUTO DIFF WBC: CPT

## 2025-05-25 PROCEDURE — 6370000000 HC RX 637 (ALT 250 FOR IP): Performed by: HOSPITALIST

## 2025-05-25 PROCEDURE — 2500000003 HC RX 250 WO HCPCS: Performed by: HOSPITALIST

## 2025-05-25 PROCEDURE — 82800 BLOOD PH: CPT

## 2025-05-25 PROCEDURE — 82010 KETONE BODYS QUAN: CPT

## 2025-05-25 PROCEDURE — 6370000000 HC RX 637 (ALT 250 FOR IP): Performed by: STUDENT IN AN ORGANIZED HEALTH CARE EDUCATION/TRAINING PROGRAM

## 2025-05-25 PROCEDURE — 80048 BASIC METABOLIC PNL TOTAL CA: CPT

## 2025-05-25 PROCEDURE — G0378 HOSPITAL OBSERVATION PER HR: HCPCS

## 2025-05-25 RX ORDER — LIDOCAINE 4 G/G
2 PATCH TOPICAL DAILY
Status: DISCONTINUED | OUTPATIENT
Start: 2025-05-25 | End: 2025-05-25 | Stop reason: HOSPADM

## 2025-05-25 RX ORDER — LUBIPROSTONE 8 UG/1
8 CAPSULE ORAL 2 TIMES DAILY WITH MEALS
Status: DISCONTINUED | OUTPATIENT
Start: 2025-05-25 | End: 2025-05-25 | Stop reason: HOSPADM

## 2025-05-25 RX ORDER — OXYCODONE HYDROCHLORIDE 5 MG/1
TABLET ORAL
Status: COMPLETED
Start: 2025-05-25 | End: 2025-05-25

## 2025-05-25 RX ORDER — LUBIPROSTONE 8 UG/1
8 CAPSULE ORAL 2 TIMES DAILY WITH MEALS
Qty: 60 CAPSULE | Refills: 3 | Status: ON HOLD | OUTPATIENT
Start: 2025-05-25

## 2025-05-25 RX ORDER — OXYCODONE HYDROCHLORIDE 5 MG/1
5 TABLET ORAL ONCE
Refills: 0 | Status: COMPLETED | OUTPATIENT
Start: 2025-05-25 | End: 2025-05-25

## 2025-05-25 RX ADMIN — LISINOPRIL 10 MG: 10 TABLET ORAL at 08:42

## 2025-05-25 RX ADMIN — OXYCODONE HYDROCHLORIDE 5 MG: 5 TABLET ORAL at 04:03

## 2025-05-25 RX ADMIN — OXYCODONE 5 MG: 5 TABLET ORAL at 04:03

## 2025-05-25 RX ADMIN — SODIUM CHLORIDE, PRESERVATIVE FREE 10 ML: 5 INJECTION INTRAVENOUS at 08:42

## 2025-05-25 RX ADMIN — METOPROLOL TARTRATE 50 MG: 50 TABLET, FILM COATED ORAL at 08:42

## 2025-05-25 ASSESSMENT — PAIN DESCRIPTION - LOCATION: LOCATION: BACK

## 2025-05-25 ASSESSMENT — PAIN DESCRIPTION - ORIENTATION: ORIENTATION: LOWER

## 2025-05-25 ASSESSMENT — PAIN SCALES - GENERAL: PAINLEVEL_OUTOF10: 10

## 2025-05-25 NOTE — PROGRESS NOTES
Consult received 5/24/25, orders placed at that time. To see patient today who states she follows with Dr. Bunch. Will defer consult to their service, patient agrees. Nursing made aware. Please call service if needed. Thank you.      MIKE Valdez - CNP 5/25/2025 8:54 AM

## 2025-05-25 NOTE — CONSULTS
Consult received  Charts/labs reviewed  Extensive review of medical record  See orders  Full consult to follow        Discussed with Dr. Marian Medina, MIKE - CNP 5/24/2025 10:35 AM for Dr. Han  
Keaton JUAREZ MD at [facility] OR    BARTHOLIN GLAND CYST EXCISION      last yr     Family History   Problem Relation Age of Onset    Diabetes Maternal Grandmother     Diabetes Paternal Grandmother     Stroke Other     Thyroid Disease Neg Hx        Home Medications  Prior to Admission medications    Medication Sig Start Date End Date Taking? Authorizing Provider   ondansetron (ZOFRAN) 4 MG tablet Take 1 tablet by mouth 3 times daily as needed for Nausea or Vomiting 5/23/25  Yes Wanda Castaneda MD   polyethylene glycol (GLYCOLAX) 17 GM/SCOOP powder Take 17 g by mouth daily as needed (constipation) 5/23/25 6/22/25 Yes Wanda Castaneda MD   insulin lispro (HUMALOG,ADMELOG) 100 UNIT/ML SOLN injection vial Inject into the skin 3 times daily (with meals) Uses with omnipod   Yes ProviderHali MD   Glucose 4-6 GM-MG CHEW Take 1 each by mouth as needed (for low blood sugar) 5/14/25  Yes Opal Iyer MD   ibuprofen (ADVIL;MOTRIN) 600 MG tablet Take 1 tablet by mouth 4 times daily as needed for Pain 4/29/25  Yes Wilfredo Rabago MD   Continuous Glucose Transmitter (DEXCOM G6 TRANSMITTER) MISC Change every 90 days 4/28/25  Yes Lalitha Schmitz APRN - CNP   Continuous Glucose Sensor (DEXCOM G6 SENSOR) MISC Change every 10 days 4/28/25  Yes Laltiha Schmitz APRN - CNP   lisinopril (PRINIVIL;ZESTRIL) 10 MG tablet Take 1 tablet by mouth daily 4/4/25  Yes Opal Iyer MD   zolpidem (AMBIEN) 10 MG tablet Take 1 tablet by mouth at bedtime.   Yes ProviderHali MD   metoprolol tartrate (LOPRESSOR) 50 MG tablet Take 1 tablet by mouth 2 times daily 2/25/25  Yes Opal Iyer MD   Insulin Disposable Pump (OMNIPOD 5 LMVR5T3 PODS GEN 5) MISC Change PODs  every 3 days 11/19/24  Yes Lalitha Schmitz APRN - CNP   Glucose (TRUEPLUS) 15 GM/32ML GEL Take 32 mLs by mouth as needed (low sugar) 7/16/24  Yes Opal Iyer MD   Insulin Disposable Pump (OMNIPOD 5 G6 INTRO, GEN 5,) KIT To use as directed 12/20/23

## 2025-05-25 NOTE — DISCHARGE SUMMARY
[facility] Hospitalist Physician Discharge Summary       OSMAN Bunch MD  [address] [id number]  [phone]    Schedule an appointment as soon as possible for a visit in 2 week(s)      Opal Iyer MD  [address] [id number] [phone]    Call      Opal Iyer MD  [address] [id number] [phone]            Activity level: as tolerated    Dispo: home      Condition on discharge: stable    Patient ID:  Diana Mann  [id number]  29 y.o.  1995    Admit date: 5/23/2025    Discharge date and time:  5/25/2025  2:52 PM    Admission Diagnoses: Principal Problem:    Nausea & vomiting  Active Problems:    Type 1 diabetes mellitus with hyperglycemia (HCC)    Medical non-compliance    Hyperglycemia    Depressive disorder    Primary hypertension    Primary insomnia  Resolved Problems:    * No resolved hospital problems. *      Discharge Diagnoses: Principal Problem:    Nausea & vomiting  Active Problems:    Type 1 diabetes mellitus with hyperglycemia (HCC)    Medical non-compliance    Hyperglycemia    Depressive disorder    Primary hypertension    Primary insomnia  Resolved Problems:    * No resolved hospital problems. *      Consults:  IP CONSULT TO HOSPITALIST  IP CONSULT TO DIABETES EDUCATOR  IP CONSULT TO GI  IP CONSULT TO VASCULAR ACCESS TEAM  IP CONSULT TO VASCULAR ACCESS TEAM    Procedures: None    Hospital Course:   Patient Diana Mann is a 29 y.o. presented with Nausea & vomiting [R11.2]    Brief summary:  Patient presented with n/v and RUQ abd pain, noted to have development of increased liver echogenicity on GBUS since prior imaging 11/2024. Seen by GI, sxs did resolve during admit, started on Amitiza for constipation noted on CT a/p, advised on cannabinoid cessation as likely contributing to symptoms. MRCP showing possible GB sludge and pericholecystic fluid, however patient without pericholecystic fluid or

## 2025-05-27 LAB
ANA SER QL IA: NEGATIVE
SMA IGG SER-ACNC: NEGATIVE

## 2025-05-28 LAB
A1AT SERPL-MCNC: 93 MG/DL (ref 90–200)
CMV IGG SERPL QL IA: PRESENT

## 2025-05-29 LAB
SEND OUT REPORT: NORMAL
TEST NAME: NORMAL

## 2025-05-30 LAB
SEND OUT REPORT: NORMAL
TEST NAME: NORMAL

## 2025-05-31 ENCOUNTER — APPOINTMENT (OUTPATIENT)
Dept: CT IMAGING | Age: 30
End: 2025-05-31
Payer: COMMERCIAL

## 2025-05-31 ENCOUNTER — HOSPITAL ENCOUNTER (OUTPATIENT)
Age: 30
Setting detail: OBSERVATION
Discharge: HOME | End: 2025-06-02
Attending: EMERGENCY MEDICINE | Admitting: STUDENT IN AN ORGANIZED HEALTH CARE EDUCATION/TRAINING PROGRAM
Payer: COMMERCIAL

## 2025-05-31 ENCOUNTER — APPOINTMENT (OUTPATIENT)
Dept: GENERAL RADIOLOGY | Age: 30
End: 2025-05-31
Payer: COMMERCIAL

## 2025-05-31 DIAGNOSIS — A59.9 TRICHOMONAS INFECTION: ICD-10-CM

## 2025-05-31 DIAGNOSIS — R11.2 INTRACTABLE NAUSEA AND VOMITING: ICD-10-CM

## 2025-05-31 DIAGNOSIS — F12.90 CANNABINOID HYPEREMESIS SYNDROME: Primary | ICD-10-CM

## 2025-05-31 DIAGNOSIS — R11.2 CANNABINOID HYPEREMESIS SYNDROME: Primary | ICD-10-CM

## 2025-05-31 LAB
ALBUMIN SERPL-MCNC: 4.1 G/DL (ref 3.5–5.2)
ALP SERPL-CCNC: 83 U/L (ref 35–104)
ALT SERPL-CCNC: 20 U/L (ref 0–32)
ANION GAP SERPL CALCULATED.3IONS-SCNC: 18 MMOL/L (ref 7–16)
ANION GAP SERPL CALCULATED.3IONS-SCNC: 19 MMOL/L (ref 7–16)
AST SERPL-CCNC: 23 U/L (ref 0–31)
B-OH-BUTYR SERPL-MCNC: 1.34 MMOL/L (ref 0.02–0.27)
BASOPHILS # BLD: 0.05 K/UL (ref 0–0.2)
BASOPHILS NFR BLD: 0 % (ref 0–2)
BILIRUB DIRECT SERPL-MCNC: <0.2 MG/DL (ref 0–0.3)
BILIRUB INDIRECT SERPL-MCNC: NORMAL MG/DL (ref 0–1)
BILIRUB SERPL-MCNC: 0.3 MG/DL (ref 0–1.2)
BILIRUB UR QL STRIP: NEGATIVE
BUN SERPL-MCNC: 12 MG/DL (ref 6–20)
BUN SERPL-MCNC: 12 MG/DL (ref 6–20)
CALCIUM SERPL-MCNC: 9.2 MG/DL (ref 8.6–10.2)
CALCIUM SERPL-MCNC: 9.8 MG/DL (ref 8.6–10.2)
CHLORIDE SERPL-SCNC: 101 MMOL/L (ref 98–107)
CHLORIDE SERPL-SCNC: 102 MMOL/L (ref 98–107)
CLARITY UR: CLEAR
CO2 SERPL-SCNC: 20 MMOL/L (ref 22–29)
CO2 SERPL-SCNC: 22 MMOL/L (ref 22–29)
COLOR UR: YELLOW
CREAT SERPL-MCNC: 0.9 MG/DL (ref 0.5–1)
CREAT SERPL-MCNC: 1 MG/DL (ref 0.5–1)
D-DIMER QUANTITATIVE: <200 NG/ML DDU (ref 0–230)
EOSINOPHIL # BLD: 0.03 K/UL (ref 0.05–0.5)
EOSINOPHILS RELATIVE PERCENT: 0 % (ref 0–6)
ERYTHROCYTE [DISTWIDTH] IN BLOOD BY AUTOMATED COUNT: 13.5 % (ref 11.5–15)
GFR, ESTIMATED: 84 ML/MIN/1.73M2
GFR, ESTIMATED: 86 ML/MIN/1.73M2
GLUCOSE SERPL-MCNC: 178 MG/DL (ref 74–99)
GLUCOSE SERPL-MCNC: 192 MG/DL (ref 74–99)
GLUCOSE UR STRIP-MCNC: NEGATIVE MG/DL
HCG, URINE, POC: NEGATIVE
HCT VFR BLD AUTO: 33.9 % (ref 34–48)
HGB BLD-MCNC: 11.6 G/DL (ref 11.5–15.5)
HGB UR QL STRIP.AUTO: ABNORMAL
IMM GRANULOCYTES # BLD AUTO: 0.03 K/UL (ref 0–0.58)
IMM GRANULOCYTES NFR BLD: 0 % (ref 0–5)
KETONES UR STRIP-MCNC: ABNORMAL MG/DL
LACTATE BLDV-SCNC: 1.6 MMOL/L (ref 0.5–2.2)
LEUKOCYTE ESTERASE UR QL STRIP: NEGATIVE
LIPASE SERPL-CCNC: 11 U/L (ref 13–60)
LYMPHOCYTES NFR BLD: 1.83 K/UL (ref 1.5–4)
LYMPHOCYTES RELATIVE PERCENT: 16 % (ref 20–42)
Lab: NORMAL
MAGNESIUM SERPL-MCNC: 1.7 MG/DL (ref 1.6–2.6)
MCH RBC QN AUTO: 28.4 PG (ref 26–35)
MCHC RBC AUTO-ENTMCNC: 34.2 G/DL (ref 32–34.5)
MCV RBC AUTO: 82.9 FL (ref 80–99.9)
MONOCYTES NFR BLD: 0.2 K/UL (ref 0.1–0.95)
MONOCYTES NFR BLD: 2 % (ref 2–12)
NEGATIVE QC PASS/FAIL: NORMAL
NEUTROPHILS NFR BLD: 81 % (ref 43–80)
NEUTS SEG NFR BLD: 9.09 K/UL (ref 1.8–7.3)
NITRITE UR QL STRIP: NEGATIVE
PH UR STRIP: 8 [PH] (ref 5–8)
PH VENOUS: 7.5 (ref 7.35–7.45)
PLATELET # BLD AUTO: 304 K/UL (ref 130–450)
PMV BLD AUTO: 10.1 FL (ref 7–12)
POSITIVE QC PASS/FAIL: NORMAL
POTASSIUM SERPL-SCNC: 3.4 MMOL/L (ref 3.5–5)
POTASSIUM SERPL-SCNC: 4.2 MMOL/L (ref 3.5–5)
PROT SERPL-MCNC: 7.6 G/DL (ref 6.4–8.3)
PROT UR STRIP-MCNC: 100 MG/DL
RBC # BLD AUTO: 4.09 M/UL (ref 3.5–5.5)
RBC #/AREA URNS HPF: ABNORMAL /HPF
SODIUM SERPL-SCNC: 140 MMOL/L (ref 132–146)
SODIUM SERPL-SCNC: 142 MMOL/L (ref 132–146)
SP GR UR STRIP: 1.02 (ref 1–1.03)
TRICHOMONAS #/AREA URNS HPF: PRESENT /[HPF]
UROBILINOGEN UR STRIP-ACNC: 0.2 EU/DL (ref 0–1)
WBC #/AREA URNS HPF: ABNORMAL /HPF
WBC OTHER # BLD: 11.2 K/UL (ref 4.5–11.5)

## 2025-05-31 PROCEDURE — 81001 URINALYSIS AUTO W/SCOPE: CPT

## 2025-05-31 PROCEDURE — 80053 COMPREHEN METABOLIC PANEL: CPT

## 2025-05-31 PROCEDURE — 87491 CHLMYD TRACH DNA AMP PROBE: CPT

## 2025-05-31 PROCEDURE — 99285 EMERGENCY DEPT VISIT HI MDM: CPT

## 2025-05-31 PROCEDURE — 82248 BILIRUBIN DIRECT: CPT

## 2025-05-31 PROCEDURE — 6360000002 HC RX W HCPCS

## 2025-05-31 PROCEDURE — 85379 FIBRIN DEGRADATION QUANT: CPT

## 2025-05-31 PROCEDURE — 71045 X-RAY EXAM CHEST 1 VIEW: CPT

## 2025-05-31 PROCEDURE — 2580000003 HC RX 258

## 2025-05-31 PROCEDURE — 80307 DRUG TEST PRSMV CHEM ANLYZR: CPT

## 2025-05-31 PROCEDURE — 87077 CULTURE AEROBIC IDENTIFY: CPT

## 2025-05-31 PROCEDURE — 85025 COMPLETE CBC W/AUTO DIFF WBC: CPT

## 2025-05-31 PROCEDURE — 83605 ASSAY OF LACTIC ACID: CPT

## 2025-05-31 PROCEDURE — 96368 THER/DIAG CONCURRENT INF: CPT

## 2025-05-31 PROCEDURE — 87591 N.GONORRHOEAE DNA AMP PROB: CPT

## 2025-05-31 PROCEDURE — 82010 KETONE BODYS QUAN: CPT

## 2025-05-31 PROCEDURE — 80048 BASIC METABOLIC PNL TOTAL CA: CPT

## 2025-05-31 PROCEDURE — 96361 HYDRATE IV INFUSION ADD-ON: CPT

## 2025-05-31 PROCEDURE — 6360000004 HC RX CONTRAST MEDICATION: Performed by: RADIOLOGY

## 2025-05-31 PROCEDURE — 6360000002 HC RX W HCPCS: Performed by: EMERGENCY MEDICINE

## 2025-05-31 PROCEDURE — 83735 ASSAY OF MAGNESIUM: CPT

## 2025-05-31 PROCEDURE — 83690 ASSAY OF LIPASE: CPT

## 2025-05-31 PROCEDURE — 96365 THER/PROPH/DIAG IV INF INIT: CPT

## 2025-05-31 PROCEDURE — 96375 TX/PRO/DX INJ NEW DRUG ADDON: CPT

## 2025-05-31 PROCEDURE — 2500000003 HC RX 250 WO HCPCS

## 2025-05-31 PROCEDURE — 74177 CT ABD & PELVIS W/CONTRAST: CPT

## 2025-05-31 PROCEDURE — 82800 BLOOD PH: CPT

## 2025-05-31 PROCEDURE — 93005 ELECTROCARDIOGRAM TRACING: CPT

## 2025-05-31 PROCEDURE — 87086 URINE CULTURE/COLONY COUNT: CPT

## 2025-05-31 RX ORDER — 0.9 % SODIUM CHLORIDE 0.9 %
1000 INTRAVENOUS SOLUTION INTRAVENOUS ONCE
Status: COMPLETED | OUTPATIENT
Start: 2025-05-31 | End: 2025-05-31

## 2025-05-31 RX ORDER — DROPERIDOL 2.5 MG/ML
2.5 INJECTION, SOLUTION INTRAMUSCULAR; INTRAVENOUS ONCE
Status: COMPLETED | OUTPATIENT
Start: 2025-05-31 | End: 2025-05-31

## 2025-05-31 RX ORDER — METRONIDAZOLE 500 MG/100ML
500 INJECTION, SOLUTION INTRAVENOUS EVERY 8 HOURS
Status: DISCONTINUED | OUTPATIENT
Start: 2025-05-31 | End: 2025-06-01

## 2025-05-31 RX ORDER — CAPSAICIN 0.025 %
CREAM (GRAM) TOPICAL 2 TIMES DAILY
Status: DISCONTINUED | OUTPATIENT
Start: 2025-05-31 | End: 2025-06-01

## 2025-05-31 RX ORDER — POTASSIUM CHLORIDE 1500 MG/1
20 TABLET, EXTENDED RELEASE ORAL ONCE
Status: DISCONTINUED | OUTPATIENT
Start: 2025-05-31 | End: 2025-05-31

## 2025-05-31 RX ORDER — IOPAMIDOL 755 MG/ML
75 INJECTION, SOLUTION INTRAVASCULAR
Status: COMPLETED | OUTPATIENT
Start: 2025-05-31 | End: 2025-05-31

## 2025-05-31 RX ORDER — FENTANYL CITRATE 50 UG/ML
25 INJECTION, SOLUTION INTRAMUSCULAR; INTRAVENOUS ONCE
Refills: 0 | Status: COMPLETED | OUTPATIENT
Start: 2025-05-31 | End: 2025-05-31

## 2025-05-31 RX ADMIN — DOXYCYCLINE 100 MG: 100 INJECTION, POWDER, LYOPHILIZED, FOR SOLUTION INTRAVENOUS at 23:52

## 2025-05-31 RX ADMIN — SODIUM CHLORIDE 1000 ML: 0.9 INJECTION, SOLUTION INTRAVENOUS at 18:01

## 2025-05-31 RX ADMIN — IOPAMIDOL 75 ML: 755 INJECTION, SOLUTION INTRAVENOUS at 21:21

## 2025-05-31 RX ADMIN — DROPERIDOL 2.5 MG: 2.5 INJECTION, SOLUTION INTRAMUSCULAR; INTRAVENOUS at 17:59

## 2025-05-31 RX ADMIN — METRONIDAZOLE 500 MG: 500 INJECTION, SOLUTION INTRAVENOUS at 22:31

## 2025-05-31 RX ADMIN — SODIUM CHLORIDE 1000 ML: 0.9 INJECTION, SOLUTION INTRAVENOUS at 18:00

## 2025-05-31 RX ADMIN — WATER 1000 MG: 1 INJECTION INTRAMUSCULAR; INTRAVENOUS; SUBCUTANEOUS at 22:25

## 2025-05-31 RX ADMIN — FENTANYL CITRATE 25 MCG: 50 INJECTION INTRAMUSCULAR; INTRAVENOUS at 20:17

## 2025-05-31 RX ADMIN — SODIUM CHLORIDE 1000 ML: 0.9 INJECTION, SOLUTION INTRAVENOUS at 22:25

## 2025-05-31 ASSESSMENT — PAIN DESCRIPTION - DESCRIPTORS: DESCRIPTORS: SHARP

## 2025-05-31 ASSESSMENT — LIFESTYLE VARIABLES
HOW MANY STANDARD DRINKS CONTAINING ALCOHOL DO YOU HAVE ON A TYPICAL DAY: 1 OR 2
HOW OFTEN DO YOU HAVE A DRINK CONTAINING ALCOHOL: MONTHLY OR LESS

## 2025-05-31 ASSESSMENT — PAIN SCALES - GENERAL: PAINLEVEL_OUTOF10: 10

## 2025-05-31 ASSESSMENT — PAIN DESCRIPTION - ORIENTATION: ORIENTATION: LOWER

## 2025-05-31 ASSESSMENT — PAIN DESCRIPTION - LOCATION: LOCATION: BACK

## 2025-05-31 NOTE — ED PROVIDER NOTES
[facility] EMERGENCY DEPARTMENT  EMERGENCY DEPARTMENT ENCOUNTER        Pt Name: Diana Mann  MRN: [id number]  Birthdate 1995  Date of evaluation: 5/31/2025  Provider: Albaro Melendrez DO  PCP: Opal Iyer MD  Note Started: 5:40 PM EDT 5/31/25    CHIEF COMPLAINT       Chief Complaint   Patient presents with    Abdominal Pain     Admitted 4 day ago with same complaints. Pt states she's had no relief.     Vomiting       HISTORY OF PRESENT ILLNESS: 1 or more Elements   History From: Patient    Limitations to history : None    Diana Mann is a 29 y.o. female who presents to the ED complaining of abdominal pain radiating to the back, nausea, vomiting, inability to hold any thing down.  She states that this has been ongoing for over a week.  She does get relief from hot showers.  She states she was seen last week and was not given much relief before discharge.  She states that she smokes weed daily.  Denies anybody ever speaking with her about cannabinoid hyperemesis syndrome.  She also complains of constipation has not been able to have a bowel movement in several days until this morning when she had diarrhea.  She also had episode of diarrhea while here denies blood in her stool.  Denies urinary symptoms or blood in her urine.    Patient denies fever, chills, headache, shortness of breath, chest pain,  lightheadedness, dysuria, hematuria, hematochezia, and melena.    Nursing Notes were all reviewed and agreed with or any disagreements were addressed in the HPI.        REVIEW OF EXTERNAL NOTES :         Patient was seen on 5/14/2025 by her PCP for hypertension.      REVIEW OF SYSTEMS :           Positives and Pertinent negatives as per HPI.     SURGICAL HISTORY     Past Surgical History:   Procedure Laterality Date    ABDOMEN SURGERY N/A 5/9/2019    INCISION AND DRAINAGE OF SUPRAPUBIC ABSESS performed by Keaton JUAREZ MD at [facility] OR    BARTHOLIN GLAND CYST

## 2025-06-01 PROBLEM — M54.50 LOW BACK PAIN: Status: ACTIVE | Noted: 2025-06-01

## 2025-06-01 PROBLEM — N94.89 PELVIC CYST IN FEMALE: Status: ACTIVE | Noted: 2025-06-01

## 2025-06-01 PROBLEM — F12.20 TETRAHYDROCANNABINOL (THC) USE DISORDER, MODERATE, DEPENDENCE (HCC): Status: ACTIVE | Noted: 2025-06-01

## 2025-06-01 PROBLEM — R11.2 NAUSEA AND VOMITING: Status: ACTIVE | Noted: 2025-06-01

## 2025-06-01 LAB
AMPHET UR QL SCN: NEGATIVE
BARBITURATES UR QL SCN: NEGATIVE
BENZODIAZ UR QL: NEGATIVE
BUPRENORPHINE UR QL: NEGATIVE
CANNABINOIDS UR QL SCN: POSITIVE
COCAINE UR QL SCN: NEGATIVE
FENTANYL UR QL: NEGATIVE
GLUCOSE BLD-MCNC: 127 MG/DL (ref 74–99)
GLUCOSE BLD-MCNC: 179 MG/DL (ref 74–99)
GLUCOSE BLD-MCNC: 200 MG/DL (ref 74–99)
GLUCOSE BLD-MCNC: 210 MG/DL (ref 74–99)
GLUCOSE BLD-MCNC: 395 MG/DL (ref 74–99)
GLUCOSE BLD-MCNC: 59 MG/DL (ref 74–99)
METHADONE UR QL: NEGATIVE
OPIATES UR QL SCN: NEGATIVE
OXYCODONE UR QL SCN: NEGATIVE
PCP UR QL SCN: NEGATIVE
TEST INFORMATION: ABNORMAL

## 2025-06-01 PROCEDURE — G0378 HOSPITAL OBSERVATION PER HR: HCPCS

## 2025-06-01 PROCEDURE — 96375 TX/PRO/DX INJ NEW DRUG ADDON: CPT

## 2025-06-01 PROCEDURE — 2580000003 HC RX 258: Performed by: STUDENT IN AN ORGANIZED HEALTH CARE EDUCATION/TRAINING PROGRAM

## 2025-06-01 PROCEDURE — 99222 1ST HOSP IP/OBS MODERATE 55: CPT | Performed by: STUDENT IN AN ORGANIZED HEALTH CARE EDUCATION/TRAINING PROGRAM

## 2025-06-01 PROCEDURE — 6370000000 HC RX 637 (ALT 250 FOR IP): Performed by: STUDENT IN AN ORGANIZED HEALTH CARE EDUCATION/TRAINING PROGRAM

## 2025-06-01 PROCEDURE — 6360000002 HC RX W HCPCS

## 2025-06-01 PROCEDURE — 2500000003 HC RX 250 WO HCPCS: Performed by: STUDENT IN AN ORGANIZED HEALTH CARE EDUCATION/TRAINING PROGRAM

## 2025-06-01 PROCEDURE — 6360000002 HC RX W HCPCS: Performed by: STUDENT IN AN ORGANIZED HEALTH CARE EDUCATION/TRAINING PROGRAM

## 2025-06-01 PROCEDURE — 96376 TX/PRO/DX INJ SAME DRUG ADON: CPT

## 2025-06-01 PROCEDURE — 6370000000 HC RX 637 (ALT 250 FOR IP)

## 2025-06-01 PROCEDURE — 96361 HYDRATE IV INFUSION ADD-ON: CPT

## 2025-06-01 PROCEDURE — 82962 GLUCOSE BLOOD TEST: CPT

## 2025-06-01 RX ORDER — INSULIN LISPRO 100 [IU]/ML
0-4 INJECTION, SOLUTION INTRAVENOUS; SUBCUTANEOUS
Status: DISCONTINUED | OUTPATIENT
Start: 2025-06-01 | End: 2025-06-02 | Stop reason: HOSPADM

## 2025-06-01 RX ORDER — INSULIN GLARGINE 100 [IU]/ML
14 INJECTION, SOLUTION SUBCUTANEOUS NIGHTLY
Status: DISCONTINUED | OUTPATIENT
Start: 2025-06-01 | End: 2025-06-02 | Stop reason: HOSPADM

## 2025-06-01 RX ORDER — HYDRALAZINE HYDROCHLORIDE 20 MG/ML
10 INJECTION INTRAMUSCULAR; INTRAVENOUS EVERY 6 HOURS PRN
Status: DISCONTINUED | OUTPATIENT
Start: 2025-06-01 | End: 2025-06-02 | Stop reason: HOSPADM

## 2025-06-01 RX ORDER — MORPHINE SULFATE 2 MG/ML
2 INJECTION, SOLUTION INTRAMUSCULAR; INTRAVENOUS ONCE
Status: COMPLETED | OUTPATIENT
Start: 2025-06-01 | End: 2025-06-01

## 2025-06-01 RX ORDER — MAGNESIUM SULFATE IN WATER 40 MG/ML
2000 INJECTION, SOLUTION INTRAVENOUS PRN
Status: DISCONTINUED | OUTPATIENT
Start: 2025-06-01 | End: 2025-06-02 | Stop reason: HOSPADM

## 2025-06-01 RX ORDER — ONDANSETRON 4 MG/1
4 TABLET, ORALLY DISINTEGRATING ORAL EVERY 8 HOURS PRN
Status: DISCONTINUED | OUTPATIENT
Start: 2025-06-01 | End: 2025-06-02 | Stop reason: HOSPADM

## 2025-06-01 RX ORDER — POLYETHYLENE GLYCOL 3350 17 G/17G
17 POWDER, FOR SOLUTION ORAL DAILY PRN
Status: DISCONTINUED | OUTPATIENT
Start: 2025-06-01 | End: 2025-06-02 | Stop reason: HOSPADM

## 2025-06-01 RX ORDER — ZOLPIDEM TARTRATE 5 MG/1
10 TABLET ORAL NIGHTLY
Status: DISCONTINUED | OUTPATIENT
Start: 2025-06-01 | End: 2025-06-02 | Stop reason: HOSPADM

## 2025-06-01 RX ORDER — KETOROLAC TROMETHAMINE 15 MG/ML
30 INJECTION, SOLUTION INTRAMUSCULAR; INTRAVENOUS EVERY 6 HOURS PRN
Status: DISCONTINUED | OUTPATIENT
Start: 2025-06-01 | End: 2025-06-02 | Stop reason: HOSPADM

## 2025-06-01 RX ORDER — SODIUM CHLORIDE 0.9 % (FLUSH) 0.9 %
5-40 SYRINGE (ML) INJECTION EVERY 12 HOURS SCHEDULED
Status: DISCONTINUED | OUTPATIENT
Start: 2025-06-01 | End: 2025-06-02 | Stop reason: HOSPADM

## 2025-06-01 RX ORDER — ACETAMINOPHEN 650 MG/1
650 SUPPOSITORY RECTAL EVERY 6 HOURS PRN
Status: DISCONTINUED | OUTPATIENT
Start: 2025-06-01 | End: 2025-06-02 | Stop reason: HOSPADM

## 2025-06-01 RX ORDER — INSULIN LISPRO 100 [IU]/ML
2 INJECTION, SOLUTION INTRAVENOUS; SUBCUTANEOUS ONCE
Status: COMPLETED | OUTPATIENT
Start: 2025-06-01 | End: 2025-06-01

## 2025-06-01 RX ORDER — METRONIDAZOLE 500 MG/100ML
500 INJECTION, SOLUTION INTRAVENOUS EVERY 12 HOURS
Status: DISCONTINUED | OUTPATIENT
Start: 2025-06-01 | End: 2025-06-01

## 2025-06-01 RX ORDER — HYDROCODONE BITARTRATE AND ACETAMINOPHEN 7.5; 325 MG/1; MG/1
1 TABLET ORAL EVERY 6 HOURS PRN
Refills: 0 | Status: DISCONTINUED | OUTPATIENT
Start: 2025-06-01 | End: 2025-06-02 | Stop reason: HOSPADM

## 2025-06-01 RX ORDER — GLUCAGON 1 MG/ML
1 KIT INJECTION PRN
Status: DISCONTINUED | OUTPATIENT
Start: 2025-06-01 | End: 2025-06-02 | Stop reason: HOSPADM

## 2025-06-01 RX ORDER — SODIUM CHLORIDE 0.9 % (FLUSH) 0.9 %
5-40 SYRINGE (ML) INJECTION PRN
Status: DISCONTINUED | OUTPATIENT
Start: 2025-06-01 | End: 2025-06-02 | Stop reason: HOSPADM

## 2025-06-01 RX ORDER — SODIUM CHLORIDE 9 MG/ML
INJECTION, SOLUTION INTRAVENOUS PRN
Status: DISCONTINUED | OUTPATIENT
Start: 2025-06-01 | End: 2025-06-02 | Stop reason: HOSPADM

## 2025-06-01 RX ORDER — METRONIDAZOLE 500 MG/1
500 TABLET ORAL EVERY 12 HOURS SCHEDULED
Status: DISCONTINUED | OUTPATIENT
Start: 2025-06-01 | End: 2025-06-02 | Stop reason: HOSPADM

## 2025-06-01 RX ORDER — HYDROCODONE BITARTRATE AND ACETAMINOPHEN 10; 325 MG/1; MG/1
1 TABLET ORAL EVERY 6 HOURS PRN
Refills: 0 | Status: DISCONTINUED | OUTPATIENT
Start: 2025-06-01 | End: 2025-06-01

## 2025-06-01 RX ORDER — KETOROLAC TROMETHAMINE 30 MG/ML
30 INJECTION, SOLUTION INTRAMUSCULAR; INTRAVENOUS EVERY 6 HOURS PRN
Status: DISCONTINUED | OUTPATIENT
Start: 2025-06-01 | End: 2025-06-01 | Stop reason: RX

## 2025-06-01 RX ORDER — MORPHINE SULFATE 2 MG/ML
2 INJECTION, SOLUTION INTRAMUSCULAR; INTRAVENOUS EVERY 4 HOURS PRN
Refills: 0 | Status: DISCONTINUED | OUTPATIENT
Start: 2025-06-01 | End: 2025-06-02 | Stop reason: HOSPADM

## 2025-06-01 RX ORDER — DEXTROSE MONOHYDRATE 100 MG/ML
INJECTION, SOLUTION INTRAVENOUS CONTINUOUS PRN
Status: DISCONTINUED | OUTPATIENT
Start: 2025-06-01 | End: 2025-06-02 | Stop reason: HOSPADM

## 2025-06-01 RX ORDER — METOPROLOL TARTRATE 50 MG
50 TABLET ORAL 2 TIMES DAILY
Status: DISCONTINUED | OUTPATIENT
Start: 2025-06-01 | End: 2025-06-02 | Stop reason: HOSPADM

## 2025-06-01 RX ORDER — LUBIPROSTONE 8 UG/1
8 CAPSULE ORAL 2 TIMES DAILY WITH MEALS
Status: DISCONTINUED | OUTPATIENT
Start: 2025-06-01 | End: 2025-06-02 | Stop reason: HOSPADM

## 2025-06-01 RX ORDER — ACETAMINOPHEN 325 MG/1
650 TABLET ORAL EVERY 6 HOURS PRN
Status: DISCONTINUED | OUTPATIENT
Start: 2025-06-01 | End: 2025-06-02 | Stop reason: HOSPADM

## 2025-06-01 RX ORDER — POTASSIUM CHLORIDE 1500 MG/1
40 TABLET, EXTENDED RELEASE ORAL PRN
Status: DISCONTINUED | OUTPATIENT
Start: 2025-06-01 | End: 2025-06-02 | Stop reason: HOSPADM

## 2025-06-01 RX ORDER — SODIUM CHLORIDE, SODIUM LACTATE, POTASSIUM CHLORIDE, CALCIUM CHLORIDE 600; 310; 30; 20 MG/100ML; MG/100ML; MG/100ML; MG/100ML
INJECTION, SOLUTION INTRAVENOUS CONTINUOUS
Status: ACTIVE | OUTPATIENT
Start: 2025-06-01 | End: 2025-06-02

## 2025-06-01 RX ORDER — LIDOCAINE 4 G/G
1 PATCH TOPICAL DAILY
Status: DISCONTINUED | OUTPATIENT
Start: 2025-06-01 | End: 2025-06-02 | Stop reason: HOSPADM

## 2025-06-01 RX ORDER — POTASSIUM CHLORIDE 7.45 MG/ML
10 INJECTION INTRAVENOUS PRN
Status: DISCONTINUED | OUTPATIENT
Start: 2025-06-01 | End: 2025-06-02 | Stop reason: HOSPADM

## 2025-06-01 RX ORDER — ONDANSETRON 2 MG/ML
4 INJECTION INTRAMUSCULAR; INTRAVENOUS EVERY 6 HOURS PRN
Status: DISCONTINUED | OUTPATIENT
Start: 2025-06-01 | End: 2025-06-02 | Stop reason: HOSPADM

## 2025-06-01 RX ADMIN — INSULIN LISPRO 1 UNITS: 100 INJECTION, SOLUTION INTRAVENOUS; SUBCUTANEOUS at 17:21

## 2025-06-01 RX ADMIN — ONDANSETRON 4 MG: 2 INJECTION, SOLUTION INTRAMUSCULAR; INTRAVENOUS at 02:13

## 2025-06-01 RX ADMIN — HYDROCODONE BITARTRATE AND ACETAMINOPHEN 1 TABLET: 7.5; 325 TABLET ORAL at 02:33

## 2025-06-01 RX ADMIN — MORPHINE SULFATE 2 MG: 2 INJECTION, SOLUTION INTRAMUSCULAR; INTRAVENOUS at 01:13

## 2025-06-01 RX ADMIN — HYDROCODONE BITARTRATE AND ACETAMINOPHEN 1 TABLET: 7.5; 325 TABLET ORAL at 09:52

## 2025-06-01 RX ADMIN — INSULIN HUMAN 10 UNITS: 100 INJECTION, SOLUTION PARENTERAL at 08:52

## 2025-06-01 RX ADMIN — POTASSIUM BICARBONATE 40 MEQ: 782 TABLET, EFFERVESCENT ORAL at 02:33

## 2025-06-01 RX ADMIN — INSULIN LISPRO 4 UNITS: 100 INJECTION, SOLUTION INTRAVENOUS; SUBCUTANEOUS at 06:12

## 2025-06-01 RX ADMIN — ZOLPIDEM TARTRATE 10 MG: 5 TABLET ORAL at 22:21

## 2025-06-01 RX ADMIN — SODIUM CHLORIDE, PRESERVATIVE FREE 10 ML: 5 INJECTION INTRAVENOUS at 02:14

## 2025-06-01 RX ADMIN — HYDROCODONE BITARTRATE AND ACETAMINOPHEN 1 TABLET: 7.5; 325 TABLET ORAL at 16:07

## 2025-06-01 RX ADMIN — HYDRALAZINE HYDROCHLORIDE 10 MG: 20 INJECTION INTRAMUSCULAR; INTRAVENOUS at 03:35

## 2025-06-01 RX ADMIN — LUBIPROSTONE 8 MCG: 8 CAPSULE, GELATIN COATED ORAL at 08:24

## 2025-06-01 RX ADMIN — INSULIN GLARGINE 14 UNITS: 100 INJECTION, SOLUTION SUBCUTANEOUS at 21:32

## 2025-06-01 RX ADMIN — INSULIN LISPRO 1 UNITS: 100 INJECTION, SOLUTION INTRAVENOUS; SUBCUTANEOUS at 21:33

## 2025-06-01 RX ADMIN — INSULIN LISPRO 2 UNITS: 100 INJECTION, SOLUTION INTRAVENOUS; SUBCUTANEOUS at 06:54

## 2025-06-01 RX ADMIN — LUBIPROSTONE 8 MCG: 8 CAPSULE, GELATIN COATED ORAL at 16:08

## 2025-06-01 RX ADMIN — MORPHINE SULFATE 2 MG: 2 INJECTION, SOLUTION INTRAMUSCULAR; INTRAVENOUS at 04:19

## 2025-06-01 RX ADMIN — METRONIDAZOLE 500 MG: 500 TABLET ORAL at 08:23

## 2025-06-01 RX ADMIN — SODIUM CHLORIDE, SODIUM LACTATE, POTASSIUM CHLORIDE, AND CALCIUM CHLORIDE: .6; .31; .03; .02 INJECTION, SOLUTION INTRAVENOUS at 02:20

## 2025-06-01 RX ADMIN — MORPHINE SULFATE 2 MG: 2 INJECTION, SOLUTION INTRAMUSCULAR; INTRAVENOUS at 21:28

## 2025-06-01 RX ADMIN — METOPROLOL TARTRATE 50 MG: 50 TABLET, FILM COATED ORAL at 08:23

## 2025-06-01 RX ADMIN — METRONIDAZOLE 500 MG: 500 TABLET ORAL at 21:24

## 2025-06-01 RX ADMIN — METOPROLOL TARTRATE 50 MG: 50 TABLET, FILM COATED ORAL at 21:24

## 2025-06-01 RX ADMIN — MORPHINE SULFATE 2 MG: 2 INJECTION, SOLUTION INTRAMUSCULAR; INTRAVENOUS at 17:44

## 2025-06-01 RX ADMIN — Medication 16 G: at 14:14

## 2025-06-01 ASSESSMENT — PAIN DESCRIPTION - DESCRIPTORS
DESCRIPTORS: SHARP
DESCRIPTORS: ACHING;DISCOMFORT
DESCRIPTORS: SHARP
DESCRIPTORS: SHARP
DESCRIPTORS: SHARP;STABBING
DESCRIPTORS: ACHING

## 2025-06-01 ASSESSMENT — PAIN DESCRIPTION - LOCATION
LOCATION: BACK

## 2025-06-01 ASSESSMENT — PAIN SCALES - GENERAL
PAINLEVEL_OUTOF10: 10
PAINLEVEL_OUTOF10: 8
PAINLEVEL_OUTOF10: 8
PAINLEVEL_OUTOF10: 5
PAINLEVEL_OUTOF10: 10
PAINLEVEL_OUTOF10: 5

## 2025-06-01 ASSESSMENT — PAIN DESCRIPTION - ORIENTATION
ORIENTATION: LOWER;RIGHT;LEFT
ORIENTATION: LOWER

## 2025-06-01 ASSESSMENT — PAIN - FUNCTIONAL ASSESSMENT
PAIN_FUNCTIONAL_ASSESSMENT: PREVENTS OR INTERFERES SOME ACTIVE ACTIVITIES AND ADLS
PAIN_FUNCTIONAL_ASSESSMENT: ACTIVITIES ARE NOT PREVENTED
PAIN_FUNCTIONAL_ASSESSMENT: PREVENTS OR INTERFERES SOME ACTIVE ACTIVITIES AND ADLS
PAIN_FUNCTIONAL_ASSESSMENT: ACTIVITIES ARE NOT PREVENTED

## 2025-06-01 ASSESSMENT — LIFESTYLE VARIABLES
HOW OFTEN DO YOU HAVE A DRINK CONTAINING ALCOHOL: MONTHLY OR LESS
HOW MANY STANDARD DRINKS CONTAINING ALCOHOL DO YOU HAVE ON A TYPICAL DAY: 1 OR 2

## 2025-06-01 NOTE — PROGRESS NOTES
Pt checked her own blood sugar before eating breakfast. BS was 305. Message sent to Dr. Ruvalcaba with results. Tania Yee RN

## 2025-06-01 NOTE — PLAN OF CARE
Problem: Chronic Conditions and Co-morbidities  Goal: Patient's chronic conditions and co-morbidity symptoms are monitored and maintained or improved  6/1/2025 1647 by Annabel Childers RN  Outcome: Progressing  6/1/2025 0323 by Vernell Arreguin RN  Outcome: Progressing     Problem: Discharge Planning  Goal: Discharge to home or other facility with appropriate resources  6/1/2025 1647 by Annabel Childers RN  Outcome: Progressing  6/1/2025 0323 by Vernell Arreguin RN  Outcome: Progressing  Flowsheets (Taken 6/1/2025 0316)  Discharge to home or other facility with appropriate resources: Refer to discharge planning if patient needs post-hospital services based on physician order or complex needs related to functional status, cognitive ability or social support system     Problem: Pain  Goal: Verbalizes/displays adequate comfort level or baseline comfort level  6/1/2025 1647 by Annabel Childers RN  Outcome: Progressing  6/1/2025 0323 by Vernell Arreguin RN  Outcome: Not Progressing  Flowsheets (Taken 6/1/2025 0323)  Verbalizes/displays adequate comfort level or baseline comfort level:   Encourage patient to monitor pain and request assistance   Assess pain using appropriate pain scale   Administer analgesics based on type and severity of pain and evaluate response   Implement non-pharmacological measures as appropriate and evaluate response     Problem: Safety - Adult  Goal: Free from fall injury  6/1/2025 1647 by Annabel Childers RN  Outcome: Progressing  6/1/2025 0323 by Vernell Arreguin RN  Outcome: Progressing     Problem: Gastrointestinal - Adult  Goal: Minimal or absence of nausea and vomiting  6/1/2025 1647 by Annabel Childers RN  Outcome: Progressing  6/1/2025 0323 by Vernell Arreguin RN  Outcome: Not Progressing  Flowsheets (Taken 6/1/2025 0323)  Minimal or absence of nausea and vomiting:   Administer IV fluids as ordered to ensure adequate hydration   Provide nonpharmacologic comfort

## 2025-06-01 NOTE — PROGRESS NOTES
Dr. Han's answering service notified of new consult. Spoke with Karen GARCIA who said patient follows with other group.  Consult placed to Dr. Bunch

## 2025-06-01 NOTE — ED NOTES
ED to Inpatient Handoff Report    Notified Leida that electronic handoff available and patient ready for transport to room 412.    Safety Risks: None identified    Patient in Restraints: no    Constant Observer or Patient : no    Telemetry Monitoring Ordered :Yes      Cardiac Rhythm: Sinus tachy    Order to transfer to unit without monitor:NO    Last MEWS: 3  Time completed: 0141    Deterioration Index Score:   Predictive Model Details          23 (Normal)  Factor Value    Calculated 6/1/2025 01:42 27% Pulse 115    Deterioration Index Model 22% Systolic 171     19% Cardiac rhythm Sinus tachy     11% Age 29 years old     9% WBC count 11.2 k/uL     8% Blood pH abnormal (7.500)     4% Potassium abnormal (3.4 mmol/L)     1% Pulse oximetry 98 %     1% Sodium 140 mmol/L     0% Hematocrit abnormal (33.9 %)     0% Respiratory rate 16     0% Temperature 98.3 °F (36.8 °C)        Vitals:    05/31/25 2053 06/01/25 0100 06/01/25 0113 06/01/25 0141   BP: (!) 167/104   (!) 171/107   Pulse: (!) 115 (!) 122  (!) 115   Resp: 13  16 16   Temp:    98.3 °F (36.8 °C)   TempSrc:    Oral   SpO2: 94%   98%         Opportunity for questions and clarification was provided.

## 2025-06-01 NOTE — PLAN OF CARE
Patient was admitted this am by the nocturnist.  Was on CLD, reports hungry, will advance diaet as tolerated.  Gi consulted.  Added 10 units regular insulin for uncontrolled blood sugars  UDS + for cannabnoids

## 2025-06-01 NOTE — PROGRESS NOTES
4 Eyes Skin Assessment     NAME:  Diana Mann  YOB: 1995  MEDICAL RECORD NUMBER:  [id number]    The patient is being assessed for  Admission    I agree that at least one RN has performed a thorough Head to Toe Skin Assessment on the patient. ALL assessment sites listed below have been assessed.      Areas assessed by both nurses:    Head, Face, Ears, Shoulders, Back, Chest, Arms, Elbows, Hands, Sacrum. Buttock, Coccyx, Ischium, and Legs. Feet and Heels        Does the Patient have a Wound? No noted wound(s)       Gavino Prevention initiated by RN: No  Wound Care Orders initiated by RN: No    Pressure Injury (Stage 3,4, Unstageable, DTI, NWPT, and Complex wounds) if present, place Wound referral order by RN under : No    New Ostomies, if present place, Ostomy referral order under : No     Nurse 1 eSignature: Electronically signed by Vernell Arreguin RN on 6/1/25 at 3:22 AM EDT    **SHARE this note so that the co-signing nurse can place an eSignature**    Nurse 2 eSignature: Electronically signed by Lanie Smith RN on 6/1/25 at 3:22 AM EDT

## 2025-06-01 NOTE — H&P
[facility] History and Physical      CHIEF COMPLAINT:  abd pain, n/v    History of Present Illness:  30 yo female w/ hx of IDDM, HTN, THC use who p/w several weeks of epigastric abd pain, n/v assoc with low back pain. Pt denies CP, dyspnea, fevers. Reports that she has smoked THC yesterday. Denies any other recreational drug use. Has seen GI and had an endoscopy done recently. However, given persistent abd pain and lack of tolerating fluids, was admitted for further management.     Also incidentally noted to be Trichomonas+ on UA.        REVIEW OF SYSTEMS:  As per HPI.    PMH:  Past Medical History:   Diagnosis Date    Acidosis     Bleeding at insertion site 01/05/2018    Common femoral artery injury, right, initial encounter 01/05/2018    COVID-19 virus infection     Depressive disorder     Diabetic ketoacidosis (HCC)     DM type 1 (diabetes mellitus, type 1) (HCC)     Lactic acid acidosis 08/31/2020    Marijuana abuse     Non-compliance     Primary hypertension 06/02/2023    Pyelonephritis     Trichimoniasis 11/19/2021       Surgical History:  Past Surgical History:   Procedure Laterality Date    ABDOMEN SURGERY N/A 5/9/2019    INCISION AND DRAINAGE OF SUPRAPUBIC ABSESS performed by Keaton JUAREZ MD at [facility] OR    BARTHOLIN GLAND CYST EXCISION      last yr       Medications Prior to Admission:    Prior to Admission medications    Medication Sig Start Date End Date Taking? Authorizing Provider   lubiprostone (AMITIZA) 8 MCG CAPS capsule Take 1 capsule by mouth 2 times daily (with meals) 5/25/25   Petey Mock, APRN - CNP   ondansetron (ZOFRAN) 4 MG tablet Take 1 tablet by mouth 3 times daily as needed for Nausea or Vomiting 5/23/25   Wanda Castaneda MD   polyethylene glycol (GLYCOLAX) 17 GM/SCOOP powder Take 17 g by mouth daily as needed (constipation) 5/23/25 6/22/25  Wanda Castaneda MD   insulin lispro (HUMALOG,ADMELOG) 100 UNIT/ML SOLN injection vial Inject into the skin 3

## 2025-06-01 NOTE — CONSULTS
Name:  Diana Mann  :  1995  MRN:  [id number]  Room:  0412/0412-A  DOS:  2025    [facility]  [facility]  Dr. Moe Sutton M.D.  Dr. Ruiz Jhaveri M.D.  Dr. Ignacio Gomez D.O.  Dr. Rodo Bunch M.D.  Dr. Mohan Brown D.O.     -NP Consult Note-    PCP:  Opal Iyer MD  Admitting Physician:  Rosetta Morales MD  Consultants: GI  Chief Complaint:    Chief Complaint   Patient presents with    Abdominal Pain     Admitted 4 day ago with same complaints. Pt states she's had no relief.     Vomiting     Reason for Consult:  Abdominal pain, nausea, vomiting, diarrhea    History of Present Illness  Diana Mann is a 29 y.o. female patient on consult for abdominal pain, nausea, vomiting, and diarrhea.  Patient is currently feeling much better.  She tolerated her diet today.  Abdominal pain has resolved.  Patient reports that when symptoms are severe, the only thing that would relieve her pain would be heat.  Emesis consisting of undigested food and liquid.  Denies any hematemesis or emesis coffee-ground material.  Patient was having some diarrhea.  Stools were brown and loose.  This has resolved.  No blood or melena.  No complaints of chest pain, shortness of breath, fever, chills, or sweats.    PMH: Diabetes, hypertension.     PSH: Abscess incision and drainage.  EGD as outpatient recently.  No prior colonoscopy.     Family history: Maternal grandmother with hypertension.  Paternal grandmother with diabetes, hypertension, and multiple myeloma.  She is unaware of any other personal or family history of GI issues or malignancy.     Social history: Patient denies tobacco use.  She reports that she smokes marijuana daily.  She otherwise denies current or past recreational or illicit drug use.    On arrival, WBC 11.2, hemoglobin 11.6, hematocrit 33.9.  Normocytic.  Platelets 304.  BUN 12, creatinine 1.0, sodium 140, potassium 3.4, chloride 101, CO2 20, calcium 9.8.  Blood glucose 192.  Liver

## 2025-06-01 NOTE — PLAN OF CARE
Problem: Chronic Conditions and Co-morbidities  Goal: Patient's chronic conditions and co-morbidity symptoms are monitored and maintained or improved  Outcome: Progressing     Problem: Discharge Planning  Goal: Discharge to home or other facility with appropriate resources  Outcome: Progressing  Flowsheets (Taken 6/1/2025 0316)  Discharge to home or other facility with appropriate resources: Refer to discharge planning if patient needs post-hospital services based on physician order or complex needs related to functional status, cognitive ability or social support system     Problem: Safety - Adult  Goal: Free from fall injury  Outcome: Progressing     Problem: Metabolic/Fluid and Electrolytes - Adult  Goal: Glucose maintained within prescribed range  Outcome: Progressing     Problem: Pain  Goal: Verbalizes/displays adequate comfort level or baseline comfort level  Outcome: Not Progressing  Flowsheets (Taken 6/1/2025 0323)  Verbalizes/displays adequate comfort level or baseline comfort level:   Encourage patient to monitor pain and request assistance   Assess pain using appropriate pain scale   Administer analgesics based on type and severity of pain and evaluate response   Implement non-pharmacological measures as appropriate and evaluate response     Problem: Gastrointestinal - Adult  Goal: Minimal or absence of nausea and vomiting  Outcome: Not Progressing  Flowsheets (Taken 6/1/2025 0323)  Minimal or absence of nausea and vomiting:   Administer IV fluids as ordered to ensure adequate hydration   Provide nonpharmacologic comfort measures as appropriate   Administer ordered antiemetic medications as needed     Problem: Metabolic/Fluid and Electrolytes - Adult  Goal: Electrolytes maintained within normal limits  Outcome: Not Progressing  Flowsheets (Taken 6/1/2025 0323)  Electrolytes maintained within normal limits:   Monitor labs and assess patient for signs and symptoms of electrolyte imbalances   Administer

## 2025-06-01 NOTE — PROGRESS NOTES
Patient complaints of breakthrough pain. Medicated with prn morphine. Patient also states dexcom is notifying her the sugar is high reading 268. Medicated with SSI prior to dinner tray.

## 2025-06-02 VITALS
SYSTOLIC BLOOD PRESSURE: 153 MMHG | BODY MASS INDEX: 21.39 KG/M2 | TEMPERATURE: 96.9 F | OXYGEN SATURATION: 100 % | RESPIRATION RATE: 18 BRPM | HEIGHT: 61 IN | DIASTOLIC BLOOD PRESSURE: 113 MMHG | WEIGHT: 113.32 LBS | HEART RATE: 87 BPM

## 2025-06-02 LAB
ANION GAP SERPL CALCULATED.3IONS-SCNC: 12 MMOL/L (ref 7–16)
BASOPHILS # BLD: 0.04 K/UL (ref 0–0.2)
BASOPHILS NFR BLD: 0 % (ref 0–2)
BUN SERPL-MCNC: 8 MG/DL (ref 6–20)
CALCIUM SERPL-MCNC: 8.6 MG/DL (ref 8.6–10.2)
CHLORIDE SERPL-SCNC: 100 MMOL/L (ref 98–107)
CO2 SERPL-SCNC: 21 MMOL/L (ref 22–29)
CREAT SERPL-MCNC: 0.7 MG/DL (ref 0.5–1)
EKG ATRIAL RATE: 116 BPM
EKG P AXIS: 83 DEGREES
EKG P-R INTERVAL: 136 MS
EKG Q-T INTERVAL: 338 MS
EKG QRS DURATION: 64 MS
EKG QTC CALCULATION (BAZETT): 469 MS
EKG R AXIS: 80 DEGREES
EKG T AXIS: 96 DEGREES
EKG VENTRICULAR RATE: 116 BPM
EOSINOPHIL # BLD: 0.08 K/UL (ref 0.05–0.5)
EOSINOPHILS RELATIVE PERCENT: 1 % (ref 0–6)
ERYTHROCYTE [DISTWIDTH] IN BLOOD BY AUTOMATED COUNT: 13.6 % (ref 11.5–15)
GFR, ESTIMATED: >90 ML/MIN/1.73M2
GLUCOSE BLD-MCNC: 148 MG/DL (ref 74–99)
GLUCOSE BLD-MCNC: 99 MG/DL (ref 74–99)
GLUCOSE SERPL-MCNC: 159 MG/DL (ref 74–99)
HCT VFR BLD AUTO: 28 % (ref 34–48)
HGB BLD-MCNC: 9.8 G/DL (ref 11.5–15.5)
IMM GRANULOCYTES # BLD AUTO: <0.03 K/UL (ref 0–0.58)
IMM GRANULOCYTES NFR BLD: 0 % (ref 0–5)
LYMPHOCYTES NFR BLD: 3.51 K/UL (ref 1.5–4)
LYMPHOCYTES RELATIVE PERCENT: 37 % (ref 20–42)
MAGNESIUM SERPL-MCNC: 1.5 MG/DL (ref 1.6–2.6)
MCH RBC QN AUTO: 29 PG (ref 26–35)
MCHC RBC AUTO-ENTMCNC: 35 G/DL (ref 32–34.5)
MCV RBC AUTO: 82.8 FL (ref 80–99.9)
MICROORGANISM SPEC CULT: ABNORMAL
MICROORGANISM SPEC CULT: ABNORMAL
MONOCYTES NFR BLD: 0.48 K/UL (ref 0.1–0.95)
MONOCYTES NFR BLD: 5 % (ref 2–12)
NEUTROPHILS NFR BLD: 56 % (ref 43–80)
NEUTS SEG NFR BLD: 5.29 K/UL (ref 1.8–7.3)
PLATELET # BLD AUTO: 277 K/UL (ref 130–450)
PMV BLD AUTO: 10.2 FL (ref 7–12)
POTASSIUM SERPL-SCNC: 3.4 MMOL/L (ref 3.5–5)
RBC # BLD AUTO: 3.38 M/UL (ref 3.5–5.5)
SERVICE CMNT-IMP: ABNORMAL
SODIUM SERPL-SCNC: 133 MMOL/L (ref 132–146)
SPECIMEN DESCRIPTION: ABNORMAL
WBC OTHER # BLD: 9.4 K/UL (ref 4.5–11.5)

## 2025-06-02 PROCEDURE — 85025 COMPLETE CBC W/AUTO DIFF WBC: CPT

## 2025-06-02 PROCEDURE — 96361 HYDRATE IV INFUSION ADD-ON: CPT

## 2025-06-02 PROCEDURE — 2500000003 HC RX 250 WO HCPCS: Performed by: STUDENT IN AN ORGANIZED HEALTH CARE EDUCATION/TRAINING PROGRAM

## 2025-06-02 PROCEDURE — 82962 GLUCOSE BLOOD TEST: CPT

## 2025-06-02 PROCEDURE — 96376 TX/PRO/DX INJ SAME DRUG ADON: CPT

## 2025-06-02 PROCEDURE — G0378 HOSPITAL OBSERVATION PER HR: HCPCS

## 2025-06-02 PROCEDURE — 93010 ELECTROCARDIOGRAM REPORT: CPT | Performed by: INTERNAL MEDICINE

## 2025-06-02 PROCEDURE — 96367 TX/PROPH/DG ADDL SEQ IV INF: CPT

## 2025-06-02 PROCEDURE — 80048 BASIC METABOLIC PNL TOTAL CA: CPT

## 2025-06-02 PROCEDURE — 96366 THER/PROPH/DIAG IV INF ADDON: CPT

## 2025-06-02 PROCEDURE — 36415 COLL VENOUS BLD VENIPUNCTURE: CPT

## 2025-06-02 PROCEDURE — 6370000000 HC RX 637 (ALT 250 FOR IP): Performed by: STUDENT IN AN ORGANIZED HEALTH CARE EDUCATION/TRAINING PROGRAM

## 2025-06-02 PROCEDURE — 6360000002 HC RX W HCPCS: Performed by: STUDENT IN AN ORGANIZED HEALTH CARE EDUCATION/TRAINING PROGRAM

## 2025-06-02 PROCEDURE — 99239 HOSP IP/OBS DSCHRG MGMT >30: CPT | Performed by: STUDENT IN AN ORGANIZED HEALTH CARE EDUCATION/TRAINING PROGRAM

## 2025-06-02 PROCEDURE — 83735 ASSAY OF MAGNESIUM: CPT

## 2025-06-02 RX ORDER — INSULIN GLARGINE 100 [IU]/ML
14 INJECTION, SOLUTION SUBCUTANEOUS NIGHTLY
Qty: 10 ML | Refills: 3 | Status: ON HOLD | OUTPATIENT
Start: 2025-06-02

## 2025-06-02 RX ORDER — METRONIDAZOLE 500 MG/1
500 TABLET ORAL EVERY 12 HOURS SCHEDULED
Qty: 11 TABLET | Refills: 0 | Status: ON HOLD | OUTPATIENT
Start: 2025-06-02 | End: 2025-06-08

## 2025-06-02 RX ORDER — DROPERIDOL 2.5 MG/ML
0.62 INJECTION, SOLUTION INTRAMUSCULAR; INTRAVENOUS EVERY 6 HOURS PRN
Status: DISCONTINUED | OUTPATIENT
Start: 2025-06-02 | End: 2025-06-02 | Stop reason: HOSPADM

## 2025-06-02 RX ADMIN — MORPHINE SULFATE 2 MG: 2 INJECTION, SOLUTION INTRAMUSCULAR; INTRAVENOUS at 09:10

## 2025-06-02 RX ADMIN — HYDRALAZINE HYDROCHLORIDE 10 MG: 20 INJECTION INTRAMUSCULAR; INTRAVENOUS at 04:26

## 2025-06-02 RX ADMIN — HYDROCODONE BITARTRATE AND ACETAMINOPHEN 1 TABLET: 7.5; 325 TABLET ORAL at 11:12

## 2025-06-02 RX ADMIN — MORPHINE SULFATE 2 MG: 2 INJECTION, SOLUTION INTRAMUSCULAR; INTRAVENOUS at 05:15

## 2025-06-02 RX ADMIN — MORPHINE SULFATE 2 MG: 2 INJECTION, SOLUTION INTRAMUSCULAR; INTRAVENOUS at 14:26

## 2025-06-02 RX ADMIN — LUBIPROSTONE 8 MCG: 8 CAPSULE, GELATIN COATED ORAL at 15:49

## 2025-06-02 RX ADMIN — MAGNESIUM SULFATE HEPTAHYDRATE 2000 MG: 40 INJECTION, SOLUTION INTRAVENOUS at 08:49

## 2025-06-02 RX ADMIN — METOPROLOL TARTRATE 50 MG: 50 TABLET, FILM COATED ORAL at 08:25

## 2025-06-02 RX ADMIN — MORPHINE SULFATE 2 MG: 2 INJECTION, SOLUTION INTRAMUSCULAR; INTRAVENOUS at 01:25

## 2025-06-02 RX ADMIN — HYDROCODONE BITARTRATE AND ACETAMINOPHEN 1 TABLET: 7.5; 325 TABLET ORAL at 04:20

## 2025-06-02 RX ADMIN — LUBIPROSTONE 8 MCG: 8 CAPSULE, GELATIN COATED ORAL at 08:25

## 2025-06-02 RX ADMIN — SODIUM CHLORIDE, PRESERVATIVE FREE 10 ML: 5 INJECTION INTRAVENOUS at 08:25

## 2025-06-02 RX ADMIN — METRONIDAZOLE 500 MG: 500 TABLET ORAL at 08:25

## 2025-06-02 RX ADMIN — POTASSIUM BICARBONATE 40 MEQ: 782 TABLET, EFFERVESCENT ORAL at 09:07

## 2025-06-02 ASSESSMENT — PAIN SCALES - GENERAL
PAINLEVEL_OUTOF10: 8
PAINLEVEL_OUTOF10: 8
PAINLEVEL_OUTOF10: 10
PAINLEVEL_OUTOF10: 8
PAINLEVEL_OUTOF10: 10
PAINLEVEL_OUTOF10: 8
PAINLEVEL_OUTOF10: 10
PAINLEVEL_OUTOF10: 8
PAINLEVEL_OUTOF10: 9

## 2025-06-02 ASSESSMENT — PAIN DESCRIPTION - DESCRIPTORS
DESCRIPTORS: SHARP;STABBING

## 2025-06-02 ASSESSMENT — PAIN DESCRIPTION - LOCATION
LOCATION: BACK
LOCATION: FLANK

## 2025-06-02 ASSESSMENT — PAIN - FUNCTIONAL ASSESSMENT
PAIN_FUNCTIONAL_ASSESSMENT: ACTIVITIES ARE NOT PREVENTED

## 2025-06-02 ASSESSMENT — PAIN DESCRIPTION - ORIENTATION
ORIENTATION: LOWER;RIGHT;LEFT
ORIENTATION: RIGHT;LEFT;LOWER
ORIENTATION: RIGHT;LEFT
ORIENTATION: RIGHT;LEFT

## 2025-06-02 NOTE — CARE COORDINATION
6/2/2025  Social Work Discharge Planning: Flank pain-right ovary cyst. IV morphine.This worker met with Pt to discuss  role and transition of care/discharge planning.Pt is independent from home and she and her grandmother reside together. Pt is diabetic  and has an insulin pump and glucometer. Pt states she needs diabetic supply scripts at discharge. Pharmacy is [facility] and PCP is Dr. VANESSA Iyer. Pt has transportation at discharge. Electronically signed by PERNELL Farooq on 6/2/2025 at 11:31 AM

## 2025-06-02 NOTE — CARE COORDINATION
Discharge orders noted.  Follow-up in the office in 2 weeks after discharge or earlier as needed -patient to call for appointment and with questions/concerns at [phone].  Thank you for the opportunity to participate in the care of Diana Mann.    Kyle Reynolds MD

## 2025-06-02 NOTE — PLAN OF CARE
Problem: Chronic Conditions and Co-morbidities  Goal: Patient's chronic conditions and co-morbidity symptoms are monitored and maintained or improved  6/2/2025 0748 by Annabel Childers RN  Outcome: Progressing  6/2/2025 0535 by Darlin Gonzalez RN  Outcome: Progressing     Problem: Discharge Planning  Goal: Discharge to home or other facility with appropriate resources  6/2/2025 0748 by Annabel Childers RN  Outcome: Progressing  6/2/2025 0535 by Darlin Gonzalez RN  Outcome: Progressing     Problem: Pain  Goal: Verbalizes/displays adequate comfort level or baseline comfort level  6/2/2025 0748 by Annabel Childers RN  Outcome: Progressing  6/2/2025 0535 by Darlin Gonzalez RN  Outcome: Progressing     Problem: Safety - Adult  Goal: Free from fall injury  6/2/2025 0748 by Annabel Childers RN  Outcome: Progressing  6/2/2025 0535 by Darlin Gonzalez RN  Outcome: Progressing     Problem: Gastrointestinal - Adult  Goal: Minimal or absence of nausea and vomiting  6/2/2025 0748 by Annabel Childers RN  Outcome: Progressing  6/2/2025 0535 by Darlin Gonzalez RN  Outcome: Progressing     Problem: Metabolic/Fluid and Electrolytes - Adult  Goal: Electrolytes maintained within normal limits  6/2/2025 0748 by Annabel Childers RN  Outcome: Progressing  6/2/2025 0535 by Darlin Gonzalez RN  Outcome: Progressing  Goal: Glucose maintained within prescribed range  6/2/2025 0748 by Annabel Childers RN  Outcome: Progressing  6/2/2025 0535 by Darlin Gonzalez RN  Outcome: Progressing

## 2025-06-02 NOTE — PLAN OF CARE
Problem: Chronic Conditions and Co-morbidities  Goal: Patient's chronic conditions and co-morbidity symptoms are monitored and maintained or improved  6/2/2025 0535 by Darlin Gonzalez RN  Outcome: Progressing  6/1/2025 1647 by Annabel Childers RN  Outcome: Progressing     Problem: Discharge Planning  Goal: Discharge to home or other facility with appropriate resources  6/2/2025 0535 by Darlin Gonzalez RN  Outcome: Progressing  6/1/2025 1647 by Annabel Childers RN  Outcome: Progressing     Problem: Pain  Goal: Verbalizes/displays adequate comfort level or baseline comfort level  6/2/2025 0535 by Darlin Gonzalez RN  Outcome: Progressing  6/1/2025 1647 by Annabel Childers RN  Outcome: Progressing     Problem: Safety - Adult  Goal: Free from fall injury  6/2/2025 0535 by Darlin Gonzalez RN  Outcome: Progressing  6/1/2025 1647 by Annabel Childers RN  Outcome: Progressing     Problem: Gastrointestinal - Adult  Goal: Minimal or absence of nausea and vomiting  6/2/2025 0535 by Darlin Gonzalez RN  Outcome: Progressing  6/1/2025 1647 by Annabel Childers RN  Outcome: Progressing     Problem: Metabolic/Fluid and Electrolytes - Adult  Goal: Electrolytes maintained within normal limits  6/2/2025 0535 by Darlin Gonzalez RN  Outcome: Progressing  6/1/2025 1647 by Annabel Childers RN  Outcome: Progressing  Goal: Glucose maintained within prescribed range  6/2/2025 0535 by Darlin Gonzalez RN  Outcome: Progressing  6/1/2025 1647 by Annabel Childers RN  Outcome: Progressing

## 2025-06-02 NOTE — DISCHARGE SUMMARY
[facility] Hospitalist Physician Discharge Summary       Opal Iyer MD  [address] [facility] [address] [phone]    Follow up      Hernesto Peter MD  [address]  [address]  [facility] [address] [phone]    Follow up      Ruiz Jhaveri MD  [address] [facility] [address]  [phone]    Follow up        Activity level: As tolerated     Dispo: Home      Condition on discharge: Stable     Patient ID:  Diana Mann  [id number]  29 y.o.  1995    Admit date: 5/31/2025    Discharge date and time:  6/2/2025  10:09 AM    Admission Diagnoses: Principal Problem:    Nausea and vomiting  Active Problems:    Hypokalemia     infection, trichomonal    Abdominal pain    Tetrahydrocannabinol (THC) use disorder, moderate, dependence (HCC)    Low back pain    Pelvic cyst in female  Resolved Problems:    * No resolved hospital problems. *      Discharge Diagnoses: Principal Problem:    Nausea and vomiting  Active Problems:    Hypokalemia     infection, trichomonal    Abdominal pain    Tetrahydrocannabinol (THC) use disorder, moderate, dependence (HCC)    Low back pain    Pelvic cyst in female  Resolved Problems:    * No resolved hospital problems. *      Consults:  IP CONSULT TO INTERNAL MEDICINE  IP CONSULT TO GI    Hospital Course:   Patient Diana Mann is a 29 y.o. presented with Trichomonas infection [A59.9]  Nausea and vomiting [R11.2]  Intractable nausea and vomiting [R11.2]  Cannabinoid hyperemesis syndrome [R11.2, F12.90]  Patient was admitted and managed for abdominal pain, n/v: Suspected 2/2 THC use. Last THC use was yesterday. UDS + cannabinoid. Pain/nausea control. IVF's, CLD, ADAT. GI Consult. Low back pain: UA and CT A/P fairly unremarkable.  Pain control, trial of lidocaine patch. Pelvic Cyst: Outpt F/U with GYN and pelvic US needed. Trichomonas+: Start Flagyl. GC/CT vaginal specimen pending. Pt denies vaginal discharge.    Discharge Exam:  General Appearance: alert

## 2025-06-02 NOTE — PROGRESS NOTES
CLINICAL PHARMACY NOTE: MEDS TO BEDS    Total # of Prescriptions Filled: 1   The following medications were delivered to the patient:  Metronidazole 500    Additional Documentation:

## 2025-06-02 NOTE — PROGRESS NOTES
Discharge instructions discussed with patient. IV removed. Tele removed. No questions at this time.

## 2025-06-03 LAB
C TRACH DNA SPEC QL PROBE+SIG AMP: NEGATIVE
C TRACH DNA SPEC QL PROBE+SIG AMP: NEGATIVE
N GONORRHOEA DNA SPEC QL PROBE+SIG AMP: NEGATIVE
N GONORRHOEA DNA SPEC QL PROBE+SIG AMP: NEGATIVE
SPECIMEN DESCRIPTION: NORMAL
SPECIMEN DESCRIPTION: NORMAL

## 2025-06-08 ENCOUNTER — HOSPITAL ENCOUNTER (OUTPATIENT)
Age: 30
Setting detail: OBSERVATION
Discharge: HOME | End: 2025-06-10
Attending: EMERGENCY MEDICINE | Admitting: HOSPITALIST
Payer: COMMERCIAL

## 2025-06-08 DIAGNOSIS — F12.90 CANNABINOID HYPEREMESIS SYNDROME: ICD-10-CM

## 2025-06-08 DIAGNOSIS — R11.2 CANNABINOID HYPEREMESIS SYNDROME: ICD-10-CM

## 2025-06-08 DIAGNOSIS — R11.2 INTRACTABLE NAUSEA AND VOMITING: Primary | ICD-10-CM

## 2025-06-08 LAB
ALBUMIN SERPL-MCNC: 3.9 G/DL (ref 3.5–5.2)
ALP SERPL-CCNC: 73 U/L (ref 35–104)
ALT SERPL-CCNC: 13 U/L (ref 0–32)
AMPHET UR QL SCN: NEGATIVE
ANION GAP SERPL CALCULATED.3IONS-SCNC: 19 MMOL/L (ref 7–16)
ANION GAP SERPL CALCULATED.3IONS-SCNC: 23 MMOL/L (ref 7–16)
APAP SERPL-MCNC: <5 UG/ML (ref 10–30)
AST SERPL-CCNC: 27 U/L (ref 0–31)
B-OH-BUTYR SERPL-MCNC: 2.76 MMOL/L (ref 0.02–0.27)
BACTERIA URNS QL MICRO: ABNORMAL
BARBITURATES UR QL SCN: NEGATIVE
BASOPHILS # BLD: 0.05 K/UL (ref 0–0.2)
BASOPHILS NFR BLD: 1 % (ref 0–2)
BENZODIAZ UR QL: NEGATIVE
BILIRUB SERPL-MCNC: 0.3 MG/DL (ref 0–1.2)
BILIRUB UR QL STRIP: NEGATIVE
BUN SERPL-MCNC: 9 MG/DL (ref 6–20)
BUN SERPL-MCNC: 9 MG/DL (ref 6–20)
BUPRENORPHINE UR QL: NEGATIVE
CALCIUM SERPL-MCNC: 8.7 MG/DL (ref 8.6–10.2)
CALCIUM SERPL-MCNC: 9.2 MG/DL (ref 8.6–10.2)
CANNABINOIDS UR QL SCN: POSITIVE
CHLORIDE SERPL-SCNC: 102 MMOL/L (ref 98–107)
CHLORIDE SERPL-SCNC: 99 MMOL/L (ref 98–107)
CLARITY UR: CLEAR
CO2 SERPL-SCNC: 18 MMOL/L (ref 22–29)
CO2 SERPL-SCNC: 19 MMOL/L (ref 22–29)
COCAINE UR QL SCN: NEGATIVE
COLOR UR: YELLOW
CREAT SERPL-MCNC: 0.7 MG/DL (ref 0.5–1)
CREAT SERPL-MCNC: 0.9 MG/DL (ref 0.5–1)
EOSINOPHIL # BLD: 0.07 K/UL (ref 0.05–0.5)
EOSINOPHILS RELATIVE PERCENT: 1 % (ref 0–6)
EPI CELLS #/AREA URNS HPF: ABNORMAL /HPF
ERYTHROCYTE [DISTWIDTH] IN BLOOD BY AUTOMATED COUNT: 13.6 % (ref 11.5–15)
ETHANOLAMINE SERPL-MCNC: <10 MG/DL (ref 0–0.08)
FENTANYL UR QL: NEGATIVE
GFR, ESTIMATED: 90 ML/MIN/1.73M2
GFR, ESTIMATED: >90 ML/MIN/1.73M2
GLUCOSE BLD-MCNC: 358 MG/DL (ref 74–99)
GLUCOSE SERPL-MCNC: 133 MG/DL (ref 74–99)
GLUCOSE SERPL-MCNC: 185 MG/DL (ref 74–99)
GLUCOSE UR STRIP-MCNC: 250 MG/DL
HCG, URINE, POC: NEGATIVE
HCT VFR BLD AUTO: 33.3 % (ref 34–48)
HGB BLD-MCNC: 11.4 G/DL (ref 11.5–15.5)
HGB UR QL STRIP.AUTO: ABNORMAL
IMM GRANULOCYTES # BLD AUTO: <0.03 K/UL (ref 0–0.58)
IMM GRANULOCYTES NFR BLD: 0 % (ref 0–5)
KETONES UR STRIP-MCNC: ABNORMAL MG/DL
LACTATE BLDV-SCNC: 1.4 MMOL/L (ref 0.5–2.2)
LACTATE BLDV-SCNC: 2.5 MMOL/L (ref 0.5–2.2)
LEUKOCYTE ESTERASE UR QL STRIP: NEGATIVE
LIPASE SERPL-CCNC: 9 U/L (ref 13–60)
LYMPHOCYTES NFR BLD: 2.29 K/UL (ref 1.5–4)
LYMPHOCYTES RELATIVE PERCENT: 24 % (ref 20–42)
Lab: NORMAL
MAGNESIUM SERPL-MCNC: 1.8 MG/DL (ref 1.6–2.6)
MCH RBC QN AUTO: 28.2 PG (ref 26–35)
MCHC RBC AUTO-ENTMCNC: 34.2 G/DL (ref 32–34.5)
MCV RBC AUTO: 82.4 FL (ref 80–99.9)
METHADONE UR QL: NEGATIVE
MONOCYTES NFR BLD: 0.31 K/UL (ref 0.1–0.95)
MONOCYTES NFR BLD: 3 % (ref 2–12)
NEGATIVE QC PASS/FAIL: NORMAL
NEUTROPHILS NFR BLD: 71 % (ref 43–80)
NEUTS SEG NFR BLD: 6.79 K/UL (ref 1.8–7.3)
NITRITE UR QL STRIP: NEGATIVE
OPIATES UR QL SCN: NEGATIVE
OXYCODONE UR QL SCN: POSITIVE
PCP UR QL SCN: NEGATIVE
PH UR STRIP: 7.5 [PH] (ref 5–8)
PH VENOUS: 7.6 (ref 7.35–7.45)
PLATELET # BLD AUTO: 384 K/UL (ref 130–450)
PMV BLD AUTO: 10.4 FL (ref 7–12)
POSITIVE QC PASS/FAIL: NORMAL
POTASSIUM SERPL-SCNC: 3.2 MMOL/L (ref 3.5–5)
POTASSIUM SERPL-SCNC: 3.3 MMOL/L (ref 3.5–5)
PROT SERPL-MCNC: 7.7 G/DL (ref 6.4–8.3)
PROT UR STRIP-MCNC: 100 MG/DL
RBC # BLD AUTO: 4.04 M/UL (ref 3.5–5.5)
RBC #/AREA URNS HPF: ABNORMAL /HPF
SALICYLATES SERPL-MCNC: <0.3 MG/DL (ref 0–30)
SODIUM SERPL-SCNC: 139 MMOL/L (ref 132–146)
SODIUM SERPL-SCNC: 141 MMOL/L (ref 132–146)
SP GR UR STRIP: 1.02 (ref 1–1.03)
T4 FREE SERPL-MCNC: 1.1 NG/DL (ref 0.9–1.7)
TEST INFORMATION: ABNORMAL
TOXIC TRICYCLIC SC,BLOOD: NEGATIVE
TROPONIN I SERPL HS-MCNC: 15 NG/L (ref 0–14)
TROPONIN I SERPL HS-MCNC: 16 NG/L (ref 0–14)
TSH SERPL DL<=0.05 MIU/L-ACNC: 1.6 UIU/ML (ref 0.27–4.2)
UROBILINOGEN UR STRIP-ACNC: 0.2 EU/DL (ref 0–1)
WBC #/AREA URNS HPF: ABNORMAL /HPF
WBC OTHER # BLD: 9.5 K/UL (ref 4.5–11.5)

## 2025-06-08 PROCEDURE — 84484 ASSAY OF TROPONIN QUANT: CPT

## 2025-06-08 PROCEDURE — G0378 HOSPITAL OBSERVATION PER HR: HCPCS

## 2025-06-08 PROCEDURE — 80048 BASIC METABOLIC PNL TOTAL CA: CPT

## 2025-06-08 PROCEDURE — 2580000003 HC RX 258: Performed by: EMERGENCY MEDICINE

## 2025-06-08 PROCEDURE — 99285 EMERGENCY DEPT VISIT HI MDM: CPT

## 2025-06-08 PROCEDURE — 96367 TX/PROPH/DG ADDL SEQ IV INF: CPT

## 2025-06-08 PROCEDURE — 84443 ASSAY THYROID STIM HORMONE: CPT

## 2025-06-08 PROCEDURE — 2580000003 HC RX 258

## 2025-06-08 PROCEDURE — 96375 TX/PRO/DX INJ NEW DRUG ADDON: CPT

## 2025-06-08 PROCEDURE — 2500000003 HC RX 250 WO HCPCS: Performed by: NURSE PRACTITIONER

## 2025-06-08 PROCEDURE — 84439 ASSAY OF FREE THYROXINE: CPT

## 2025-06-08 PROCEDURE — 96365 THER/PROPH/DIAG IV INF INIT: CPT

## 2025-06-08 PROCEDURE — 80143 DRUG ASSAY ACETAMINOPHEN: CPT

## 2025-06-08 PROCEDURE — 6360000002 HC RX W HCPCS: Performed by: HOSPITALIST

## 2025-06-08 PROCEDURE — 82962 GLUCOSE BLOOD TEST: CPT

## 2025-06-08 PROCEDURE — 96366 THER/PROPH/DIAG IV INF ADDON: CPT

## 2025-06-08 PROCEDURE — 6370000000 HC RX 637 (ALT 250 FOR IP)

## 2025-06-08 PROCEDURE — 82010 KETONE BODYS QUAN: CPT

## 2025-06-08 PROCEDURE — APPSS60 APP SPLIT SHARED TIME 46-60 MINUTES: Performed by: NURSE PRACTITIONER

## 2025-06-08 PROCEDURE — 85025 COMPLETE CBC W/AUTO DIFF WBC: CPT

## 2025-06-08 PROCEDURE — G0480 DRUG TEST DEF 1-7 CLASSES: HCPCS

## 2025-06-08 PROCEDURE — 80179 DRUG ASSAY SALICYLATE: CPT

## 2025-06-08 PROCEDURE — 83690 ASSAY OF LIPASE: CPT

## 2025-06-08 PROCEDURE — 96376 TX/PRO/DX INJ SAME DRUG ADON: CPT

## 2025-06-08 PROCEDURE — 96374 THER/PROPH/DIAG INJ IV PUSH: CPT

## 2025-06-08 PROCEDURE — 80053 COMPREHEN METABOLIC PANEL: CPT

## 2025-06-08 PROCEDURE — 96361 HYDRATE IV INFUSION ADD-ON: CPT

## 2025-06-08 PROCEDURE — 87086 URINE CULTURE/COLONY COUNT: CPT

## 2025-06-08 PROCEDURE — 83605 ASSAY OF LACTIC ACID: CPT

## 2025-06-08 PROCEDURE — 6370000000 HC RX 637 (ALT 250 FOR IP): Performed by: EMERGENCY MEDICINE

## 2025-06-08 PROCEDURE — 6360000002 HC RX W HCPCS

## 2025-06-08 PROCEDURE — 93005 ELECTROCARDIOGRAM TRACING: CPT

## 2025-06-08 PROCEDURE — 83735 ASSAY OF MAGNESIUM: CPT

## 2025-06-08 PROCEDURE — 80307 DRUG TEST PRSMV CHEM ANLYZR: CPT

## 2025-06-08 PROCEDURE — 82800 BLOOD PH: CPT

## 2025-06-08 PROCEDURE — 6370000000 HC RX 637 (ALT 250 FOR IP): Performed by: NURSE PRACTITIONER

## 2025-06-08 PROCEDURE — 81001 URINALYSIS AUTO W/SCOPE: CPT

## 2025-06-08 PROCEDURE — 6360000002 HC RX W HCPCS: Performed by: EMERGENCY MEDICINE

## 2025-06-08 RX ORDER — LIDOCAINE 4 G/G
1 PATCH TOPICAL DAILY
Status: DISCONTINUED | OUTPATIENT
Start: 2025-06-09 | End: 2025-06-10 | Stop reason: HOSPADM

## 2025-06-08 RX ORDER — ACETAMINOPHEN 650 MG/1
650 SUPPOSITORY RECTAL EVERY 6 HOURS PRN
Status: DISCONTINUED | OUTPATIENT
Start: 2025-06-08 | End: 2025-06-10 | Stop reason: HOSPADM

## 2025-06-08 RX ORDER — LORAZEPAM 2 MG/ML
0.5 INJECTION INTRAMUSCULAR ONCE
Status: DISCONTINUED | OUTPATIENT
Start: 2025-06-08 | End: 2025-06-08

## 2025-06-08 RX ORDER — ACETAMINOPHEN 325 MG/1
650 TABLET ORAL EVERY 6 HOURS PRN
Status: DISCONTINUED | OUTPATIENT
Start: 2025-06-08 | End: 2025-06-10 | Stop reason: HOSPADM

## 2025-06-08 RX ORDER — ENOXAPARIN SODIUM 100 MG/ML
30 INJECTION SUBCUTANEOUS DAILY
Status: DISCONTINUED | OUTPATIENT
Start: 2025-06-09 | End: 2025-06-10 | Stop reason: HOSPADM

## 2025-06-08 RX ORDER — MORPHINE SULFATE 2 MG/ML
2 INJECTION, SOLUTION INTRAMUSCULAR; INTRAVENOUS ONCE
Status: COMPLETED | OUTPATIENT
Start: 2025-06-08 | End: 2025-06-08

## 2025-06-08 RX ORDER — DEXTROSE MONOHYDRATE 100 MG/ML
INJECTION, SOLUTION INTRAVENOUS CONTINUOUS PRN
Status: DISCONTINUED | OUTPATIENT
Start: 2025-06-08 | End: 2025-06-10 | Stop reason: HOSPADM

## 2025-06-08 RX ORDER — METRONIDAZOLE 500 MG/1
500 TABLET ORAL EVERY 12 HOURS SCHEDULED
Status: COMPLETED | OUTPATIENT
Start: 2025-06-08 | End: 2025-06-09

## 2025-06-08 RX ORDER — 0.9 % SODIUM CHLORIDE 0.9 %
1000 INTRAVENOUS SOLUTION INTRAVENOUS ONCE
Status: DISCONTINUED | OUTPATIENT
Start: 2025-06-08 | End: 2025-06-08

## 2025-06-08 RX ORDER — INSULIN LISPRO 100 [IU]/ML
4 INJECTION, SOLUTION INTRAVENOUS; SUBCUTANEOUS
Status: DISCONTINUED | OUTPATIENT
Start: 2025-06-09 | End: 2025-06-10 | Stop reason: HOSPADM

## 2025-06-08 RX ORDER — POTASSIUM CHLORIDE 1500 MG/1
40 TABLET, EXTENDED RELEASE ORAL ONCE
Status: COMPLETED | OUTPATIENT
Start: 2025-06-08 | End: 2025-06-08

## 2025-06-08 RX ORDER — POTASSIUM CHLORIDE 7.45 MG/ML
10 INJECTION INTRAVENOUS
Status: COMPLETED | OUTPATIENT
Start: 2025-06-08 | End: 2025-06-08

## 2025-06-08 RX ORDER — SODIUM CHLORIDE 9 MG/ML
INJECTION, SOLUTION INTRAVENOUS CONTINUOUS
Status: ACTIVE | OUTPATIENT
Start: 2025-06-08 | End: 2025-06-09

## 2025-06-08 RX ORDER — ONDANSETRON 4 MG/1
4 TABLET, ORALLY DISINTEGRATING ORAL EVERY 8 HOURS PRN
Status: DISCONTINUED | OUTPATIENT
Start: 2025-06-08 | End: 2025-06-10 | Stop reason: HOSPADM

## 2025-06-08 RX ORDER — LUBIPROSTONE 8 UG/1
8 CAPSULE ORAL 2 TIMES DAILY WITH MEALS
Status: DISCONTINUED | OUTPATIENT
Start: 2025-06-09 | End: 2025-06-10 | Stop reason: HOSPADM

## 2025-06-08 RX ORDER — ZOLPIDEM TARTRATE 5 MG/1
10 TABLET ORAL NIGHTLY
Status: DISCONTINUED | OUTPATIENT
Start: 2025-06-08 | End: 2025-06-10 | Stop reason: HOSPADM

## 2025-06-08 RX ORDER — GLUCAGON 1 MG/ML
1 KIT INJECTION PRN
Status: DISCONTINUED | OUTPATIENT
Start: 2025-06-08 | End: 2025-06-10 | Stop reason: HOSPADM

## 2025-06-08 RX ORDER — AMLODIPINE BESYLATE 10 MG/1
10 TABLET ORAL DAILY
COMMUNITY

## 2025-06-08 RX ORDER — POTASSIUM CHLORIDE 1500 MG/1
40 TABLET, EXTENDED RELEASE ORAL PRN
Status: DISCONTINUED | OUTPATIENT
Start: 2025-06-08 | End: 2025-06-10 | Stop reason: HOSPADM

## 2025-06-08 RX ORDER — POTASSIUM CHLORIDE 7.45 MG/ML
10 INJECTION INTRAVENOUS PRN
Status: DISCONTINUED | OUTPATIENT
Start: 2025-06-08 | End: 2025-06-10 | Stop reason: HOSPADM

## 2025-06-08 RX ORDER — MAGNESIUM SULFATE IN WATER 40 MG/ML
2000 INJECTION, SOLUTION INTRAVENOUS ONCE
Status: COMPLETED | OUTPATIENT
Start: 2025-06-08 | End: 2025-06-09

## 2025-06-08 RX ORDER — INSULIN LISPRO 100 [IU]/ML
8 INJECTION, SOLUTION INTRAVENOUS; SUBCUTANEOUS ONCE
Status: DISCONTINUED | OUTPATIENT
Start: 2025-06-08 | End: 2025-06-08

## 2025-06-08 RX ORDER — SODIUM CHLORIDE 0.9 % (FLUSH) 0.9 %
5-40 SYRINGE (ML) INJECTION PRN
Status: DISCONTINUED | OUTPATIENT
Start: 2025-06-08 | End: 2025-06-10 | Stop reason: HOSPADM

## 2025-06-08 RX ORDER — INSULIN LISPRO 100 [IU]/ML
0-4 INJECTION, SOLUTION INTRAVENOUS; SUBCUTANEOUS
Status: DISCONTINUED | OUTPATIENT
Start: 2025-06-08 | End: 2025-06-10 | Stop reason: HOSPADM

## 2025-06-08 RX ORDER — METOPROLOL TARTRATE 50 MG
50 TABLET ORAL ONCE
Status: COMPLETED | OUTPATIENT
Start: 2025-06-08 | End: 2025-06-08

## 2025-06-08 RX ORDER — 0.9 % SODIUM CHLORIDE 0.9 %
1000 INTRAVENOUS SOLUTION INTRAVENOUS ONCE
Status: COMPLETED | OUTPATIENT
Start: 2025-06-08 | End: 2025-06-08

## 2025-06-08 RX ORDER — KETOROLAC TROMETHAMINE 15 MG/ML
15 INJECTION, SOLUTION INTRAMUSCULAR; INTRAVENOUS EVERY 6 HOURS PRN
Status: DISCONTINUED | OUTPATIENT
Start: 2025-06-08 | End: 2025-06-10 | Stop reason: HOSPADM

## 2025-06-08 RX ORDER — METOPROLOL TARTRATE 50 MG
50 TABLET ORAL 2 TIMES DAILY
Status: DISCONTINUED | OUTPATIENT
Start: 2025-06-09 | End: 2025-06-10 | Stop reason: HOSPADM

## 2025-06-08 RX ORDER — LABETALOL HYDROCHLORIDE 5 MG/ML
10 INJECTION, SOLUTION INTRAVENOUS ONCE
Status: COMPLETED | OUTPATIENT
Start: 2025-06-08 | End: 2025-06-09

## 2025-06-08 RX ORDER — PROCHLORPERAZINE EDISYLATE 5 MG/ML
10 INJECTION INTRAMUSCULAR; INTRAVENOUS ONCE
Status: COMPLETED | OUTPATIENT
Start: 2025-06-08 | End: 2025-06-08

## 2025-06-08 RX ORDER — INSULIN GLARGINE 100 [IU]/ML
14 INJECTION, SOLUTION SUBCUTANEOUS NIGHTLY
Status: DISCONTINUED | OUTPATIENT
Start: 2025-06-08 | End: 2025-06-10 | Stop reason: HOSPADM

## 2025-06-08 RX ORDER — MAGNESIUM SULFATE IN WATER 40 MG/ML
2000 INJECTION, SOLUTION INTRAVENOUS PRN
Status: DISCONTINUED | OUTPATIENT
Start: 2025-06-08 | End: 2025-06-10 | Stop reason: HOSPADM

## 2025-06-08 RX ORDER — FENTANYL CITRATE 50 UG/ML
50 INJECTION, SOLUTION INTRAMUSCULAR; INTRAVENOUS ONCE
Status: COMPLETED | OUTPATIENT
Start: 2025-06-08 | End: 2025-06-08

## 2025-06-08 RX ORDER — SODIUM CHLORIDE 9 MG/ML
INJECTION, SOLUTION INTRAVENOUS PRN
Status: DISCONTINUED | OUTPATIENT
Start: 2025-06-08 | End: 2025-06-10 | Stop reason: HOSPADM

## 2025-06-08 RX ORDER — LISINOPRIL 10 MG/1
10 TABLET ORAL DAILY
Status: DISCONTINUED | OUTPATIENT
Start: 2025-06-09 | End: 2025-06-10 | Stop reason: HOSPADM

## 2025-06-08 RX ORDER — SODIUM CHLORIDE 0.9 % (FLUSH) 0.9 %
5-40 SYRINGE (ML) INJECTION EVERY 12 HOURS SCHEDULED
Status: DISCONTINUED | OUTPATIENT
Start: 2025-06-08 | End: 2025-06-10 | Stop reason: HOSPADM

## 2025-06-08 RX ORDER — POLYETHYLENE GLYCOL 3350 17 G/17G
17 POWDER, FOR SOLUTION ORAL DAILY PRN
Status: DISCONTINUED | OUTPATIENT
Start: 2025-06-08 | End: 2025-06-10 | Stop reason: HOSPADM

## 2025-06-08 RX ORDER — MORPHINE SULFATE 4 MG/ML
4 INJECTION, SOLUTION INTRAMUSCULAR; INTRAVENOUS ONCE
Refills: 0 | Status: COMPLETED | OUTPATIENT
Start: 2025-06-08 | End: 2025-06-08

## 2025-06-08 RX ORDER — AMLODIPINE BESYLATE 10 MG/1
10 TABLET ORAL DAILY
Status: DISCONTINUED | OUTPATIENT
Start: 2025-06-08 | End: 2025-06-10 | Stop reason: HOSPADM

## 2025-06-08 RX ORDER — ONDANSETRON 2 MG/ML
4 INJECTION INTRAMUSCULAR; INTRAVENOUS EVERY 6 HOURS PRN
Status: DISCONTINUED | OUTPATIENT
Start: 2025-06-08 | End: 2025-06-10 | Stop reason: HOSPADM

## 2025-06-08 RX ORDER — DIPHENHYDRAMINE HYDROCHLORIDE 50 MG/ML
25 INJECTION, SOLUTION INTRAMUSCULAR; INTRAVENOUS ONCE
Status: COMPLETED | OUTPATIENT
Start: 2025-06-08 | End: 2025-06-08

## 2025-06-08 RX ADMIN — MORPHINE SULFATE 2 MG: 2 INJECTION, SOLUTION INTRAMUSCULAR; INTRAVENOUS at 15:25

## 2025-06-08 RX ADMIN — SODIUM CHLORIDE 1000 ML: 0.9 INJECTION, SOLUTION INTRAVENOUS at 15:24

## 2025-06-08 RX ADMIN — POTASSIUM CHLORIDE 10 MEQ: 7.46 INJECTION, SOLUTION INTRAVENOUS at 19:51

## 2025-06-08 RX ADMIN — MAGNESIUM SULFATE HEPTAHYDRATE 2000 MG: 40 INJECTION, SOLUTION INTRAVENOUS at 22:27

## 2025-06-08 RX ADMIN — AMLODIPINE BESYLATE 10 MG: 10 TABLET ORAL at 22:14

## 2025-06-08 RX ADMIN — DIPHENHYDRAMINE HYDROCHLORIDE 25 MG: 50 INJECTION INTRAMUSCULAR; INTRAVENOUS at 17:20

## 2025-06-08 RX ADMIN — FENTANYL CITRATE 50 MCG: 50 INJECTION INTRAMUSCULAR; INTRAVENOUS at 17:27

## 2025-06-08 RX ADMIN — METOPROLOL TARTRATE 50 MG: 50 TABLET, FILM COATED ORAL at 21:01

## 2025-06-08 RX ADMIN — INSULIN GLARGINE 14 UNITS: 100 INJECTION, SOLUTION SUBCUTANEOUS at 22:16

## 2025-06-08 RX ADMIN — SODIUM CHLORIDE: 0.9 INJECTION, SOLUTION INTRAVENOUS at 22:24

## 2025-06-08 RX ADMIN — POTASSIUM CHLORIDE 10 MEQ: 7.46 INJECTION, SOLUTION INTRAVENOUS at 18:38

## 2025-06-08 RX ADMIN — SODIUM CHLORIDE, PRESERVATIVE FREE 10 ML: 5 INJECTION INTRAVENOUS at 22:15

## 2025-06-08 RX ADMIN — POTASSIUM CHLORIDE 40 MEQ: 1500 TABLET, EXTENDED RELEASE ORAL at 17:27

## 2025-06-08 RX ADMIN — ZOLPIDEM TARTRATE 10 MG: 5 TABLET ORAL at 22:14

## 2025-06-08 RX ADMIN — MORPHINE SULFATE 4 MG: 4 INJECTION, SOLUTION INTRAMUSCULAR; INTRAVENOUS at 19:48

## 2025-06-08 RX ADMIN — PROCHLORPERAZINE EDISYLATE 10 MG: 5 INJECTION INTRAMUSCULAR; INTRAVENOUS at 17:20

## 2025-06-08 ASSESSMENT — PAIN SCALES - GENERAL: PAINLEVEL_OUTOF10: 10

## 2025-06-08 ASSESSMENT — PAIN DESCRIPTION - LOCATION: LOCATION: BACK

## 2025-06-08 ASSESSMENT — PAIN DESCRIPTION - ORIENTATION: ORIENTATION: LOWER

## 2025-06-08 ASSESSMENT — PAIN DESCRIPTION - DESCRIPTORS: DESCRIPTORS: SHARP

## 2025-06-08 NOTE — ED PROVIDER NOTES
mL IVPB (Peripheral Line) (0 mEq IntraVENous Stopped 6/8/25 2059)   morphine sulfate (PF) injection 4 mg (4 mg IntraVENous Given 6/8/25 1948)   metoprolol tartrate (LOPRESSOR) tablet 50 mg (50 mg Oral Given 6/8/25 2101)   magnesium sulfate 2000 mg in 50 mL IVPB premix (0 mg IntraVENous Stopped 6/9/25 0007)   metroNIDAZOLE (FLAGYL) tablet 500 mg (500 mg Oral Given 6/9/25 0803)   labetalol (NORMODYNE;TRANDATE) injection 10 mg (10 mg IntraVENous Given 6/9/25 0009)   ketorolac (TORADOL) injection 15 mg (15 mg IntraVENous Given 6/9/25 0300)         Is this patient to be included in the SEP-1 core measure due to severe sepsis or septic shock? No Exclusion criteria - the patient is NOT to be included for SEP-1 Core Measure due to: Alternative explanation for abnormal labs/vitals that do not relate to sepsis, see MDM for further explanation        Medical Decision Making/Differential Diagnosis:    CC/HPI Summary, Social Determinants of health, Records Reviewed, DDx, testing done/not done, ED Course, Reassessment, disposition considerations/shared decision making with patient, consults, disposition:      ED Course as of 06/09/25 1325   Sun Jun 08, 2025   1515 EKG:  This EKG is signed and interpreted by me.    Rate: 110  Rhythm: Sinus  Interpretation: no acute changes, no acute sedations and axis normal, QTc 484, KY interval 136  Comparison: stable as compared to patient's most recent EKG on 6/2/2025   [MB]   0486 29-year-old female past medical history of diabetes presents with concern for abdominal pain, nausea and vomiting.  Notes that it worsened today, nothing is made better or worse, she does smoke marijuana at home and denies drinking.  She says she has been using her insulin as indicated, no fevers or chills, no dysuria or hematuria, no coughing, no other associated complaints.    Hemodynamically stable on arrival to the emergency room, nontoxic in mild acute distress secondary to pain.  Awake, alert, oriented,

## 2025-06-09 LAB
ALBUMIN SERPL-MCNC: 3.7 G/DL (ref 3.5–5.2)
ALP SERPL-CCNC: 66 U/L (ref 35–104)
ALT SERPL-CCNC: 14 U/L (ref 0–32)
ANION GAP SERPL CALCULATED.3IONS-SCNC: 14 MMOL/L (ref 7–16)
AST SERPL-CCNC: 17 U/L (ref 0–31)
BASOPHILS # BLD: 0.05 K/UL (ref 0–0.2)
BASOPHILS NFR BLD: 1 % (ref 0–2)
BILIRUB SERPL-MCNC: 0.2 MG/DL (ref 0–1.2)
BUN SERPL-MCNC: 8 MG/DL (ref 6–20)
CALCIUM SERPL-MCNC: 8.5 MG/DL (ref 8.6–10.2)
CHLORIDE SERPL-SCNC: 101 MMOL/L (ref 98–107)
CO2 SERPL-SCNC: 22 MMOL/L (ref 22–29)
CREAT SERPL-MCNC: 0.8 MG/DL (ref 0.5–1)
EKG ATRIAL RATE: 110 BPM
EKG P AXIS: 69 DEGREES
EKG P-R INTERVAL: 136 MS
EKG Q-T INTERVAL: 358 MS
EKG QRS DURATION: 70 MS
EKG QTC CALCULATION (BAZETT): 484 MS
EKG R AXIS: 59 DEGREES
EKG T AXIS: 51 DEGREES
EKG VENTRICULAR RATE: 110 BPM
EOSINOPHIL # BLD: 0.05 K/UL (ref 0.05–0.5)
EOSINOPHILS RELATIVE PERCENT: 1 % (ref 0–6)
ERYTHROCYTE [DISTWIDTH] IN BLOOD BY AUTOMATED COUNT: 14 % (ref 11.5–15)
GFR, ESTIMATED: >90 ML/MIN/1.73M2
GLUCOSE BLD-MCNC: 140 MG/DL (ref 74–99)
GLUCOSE BLD-MCNC: 154 MG/DL (ref 74–99)
GLUCOSE BLD-MCNC: 220 MG/DL (ref 74–99)
GLUCOSE BLD-MCNC: 234 MG/DL (ref 74–99)
GLUCOSE BLD-MCNC: 65 MG/DL (ref 74–99)
GLUCOSE SERPL-MCNC: 226 MG/DL (ref 74–99)
HCT VFR BLD AUTO: 30.4 % (ref 34–48)
HGB BLD-MCNC: 9.8 G/DL (ref 11.5–15.5)
IMM GRANULOCYTES # BLD AUTO: 0.04 K/UL (ref 0–0.58)
IMM GRANULOCYTES NFR BLD: 0 % (ref 0–5)
LYMPHOCYTES NFR BLD: 3.03 K/UL (ref 1.5–4)
LYMPHOCYTES RELATIVE PERCENT: 34 % (ref 20–42)
MAGNESIUM SERPL-MCNC: 2.3 MG/DL (ref 1.6–2.6)
MCH RBC QN AUTO: 28 PG (ref 26–35)
MCHC RBC AUTO-ENTMCNC: 32.2 G/DL (ref 32–34.5)
MCV RBC AUTO: 86.9 FL (ref 80–99.9)
MICROORGANISM SPEC CULT: NO GROWTH
MONOCYTES NFR BLD: 0.54 K/UL (ref 0.1–0.95)
MONOCYTES NFR BLD: 6 % (ref 2–12)
NEUTROPHILS NFR BLD: 59 % (ref 43–80)
NEUTS SEG NFR BLD: 5.25 K/UL (ref 1.8–7.3)
PLATELET # BLD AUTO: 340 K/UL (ref 130–450)
PMV BLD AUTO: 10.3 FL (ref 7–12)
POTASSIUM SERPL-SCNC: 3.4 MMOL/L (ref 3.5–5)
PROT SERPL-MCNC: 6.5 G/DL (ref 6.4–8.3)
RBC # BLD AUTO: 3.5 M/UL (ref 3.5–5.5)
SERVICE CMNT-IMP: NORMAL
SODIUM SERPL-SCNC: 137 MMOL/L (ref 132–146)
SPECIMEN DESCRIPTION: NORMAL
WBC OTHER # BLD: 9 K/UL (ref 4.5–11.5)

## 2025-06-09 PROCEDURE — 80053 COMPREHEN METABOLIC PANEL: CPT

## 2025-06-09 PROCEDURE — 96375 TX/PRO/DX INJ NEW DRUG ADDON: CPT

## 2025-06-09 PROCEDURE — 82962 GLUCOSE BLOOD TEST: CPT

## 2025-06-09 PROCEDURE — 85025 COMPLETE CBC W/AUTO DIFF WBC: CPT

## 2025-06-09 PROCEDURE — 6360000002 HC RX W HCPCS: Performed by: STUDENT IN AN ORGANIZED HEALTH CARE EDUCATION/TRAINING PROGRAM

## 2025-06-09 PROCEDURE — G0378 HOSPITAL OBSERVATION PER HR: HCPCS

## 2025-06-09 PROCEDURE — 87591 N.GONORRHOEAE DNA AMP PROB: CPT

## 2025-06-09 PROCEDURE — 2580000003 HC RX 258: Performed by: EMERGENCY MEDICINE

## 2025-06-09 PROCEDURE — 87491 CHLMYD TRACH DNA AMP PROBE: CPT

## 2025-06-09 PROCEDURE — 6360000002 HC RX W HCPCS: Performed by: NURSE PRACTITIONER

## 2025-06-09 PROCEDURE — 93010 ELECTROCARDIOGRAM REPORT: CPT | Performed by: INTERNAL MEDICINE

## 2025-06-09 PROCEDURE — 96376 TX/PRO/DX INJ SAME DRUG ADON: CPT

## 2025-06-09 PROCEDURE — 36415 COLL VENOUS BLD VENIPUNCTURE: CPT

## 2025-06-09 PROCEDURE — 2500000003 HC RX 250 WO HCPCS: Performed by: NURSE PRACTITIONER

## 2025-06-09 PROCEDURE — 83735 ASSAY OF MAGNESIUM: CPT

## 2025-06-09 PROCEDURE — 96361 HYDRATE IV INFUSION ADD-ON: CPT

## 2025-06-09 PROCEDURE — 6370000000 HC RX 637 (ALT 250 FOR IP): Performed by: NURSE PRACTITIONER

## 2025-06-09 PROCEDURE — 99232 SBSQ HOSP IP/OBS MODERATE 35: CPT | Performed by: STUDENT IN AN ORGANIZED HEALTH CARE EDUCATION/TRAINING PROGRAM

## 2025-06-09 RX ORDER — KETOROLAC TROMETHAMINE 15 MG/ML
15 INJECTION, SOLUTION INTRAMUSCULAR; INTRAVENOUS ONCE
Status: COMPLETED | OUTPATIENT
Start: 2025-06-09 | End: 2025-06-09

## 2025-06-09 RX ORDER — MORPHINE SULFATE 2 MG/ML
1 INJECTION, SOLUTION INTRAMUSCULAR; INTRAVENOUS ONCE
Status: COMPLETED | OUTPATIENT
Start: 2025-06-09 | End: 2025-06-09

## 2025-06-09 RX ADMIN — KETOROLAC TROMETHAMINE 15 MG: 15 INJECTION, SOLUTION INTRAMUSCULAR; INTRAVENOUS at 03:00

## 2025-06-09 RX ADMIN — ACETAMINOPHEN 650 MG: 325 TABLET ORAL at 01:37

## 2025-06-09 RX ADMIN — LUBIPROSTONE 8 MCG: 8 CAPSULE, GELATIN COATED ORAL at 08:05

## 2025-06-09 RX ADMIN — LUBIPROSTONE 8 MCG: 8 CAPSULE, GELATIN COATED ORAL at 16:27

## 2025-06-09 RX ADMIN — INSULIN LISPRO 4 UNITS: 100 INJECTION, SOLUTION INTRAVENOUS; SUBCUTANEOUS at 08:08

## 2025-06-09 RX ADMIN — ZOLPIDEM TARTRATE 10 MG: 5 TABLET ORAL at 22:14

## 2025-06-09 RX ADMIN — INSULIN LISPRO 1 UNITS: 100 INJECTION, SOLUTION INTRAVENOUS; SUBCUTANEOUS at 08:07

## 2025-06-09 RX ADMIN — METOPROLOL TARTRATE 50 MG: 50 TABLET, FILM COATED ORAL at 22:18

## 2025-06-09 RX ADMIN — INSULIN LISPRO 4 UNITS: 100 INJECTION, SOLUTION INTRAVENOUS; SUBCUTANEOUS at 00:09

## 2025-06-09 RX ADMIN — POTASSIUM BICARBONATE 40 MEQ: 782 TABLET, EFFERVESCENT ORAL at 10:11

## 2025-06-09 RX ADMIN — SODIUM CHLORIDE, PRESERVATIVE FREE 10 ML: 5 INJECTION INTRAVENOUS at 22:20

## 2025-06-09 RX ADMIN — LISINOPRIL 10 MG: 10 TABLET ORAL at 08:03

## 2025-06-09 RX ADMIN — METRONIDAZOLE 500 MG: 500 TABLET ORAL at 00:08

## 2025-06-09 RX ADMIN — KETOROLAC TROMETHAMINE 15 MG: 15 INJECTION, SOLUTION INTRAMUSCULAR; INTRAVENOUS at 00:11

## 2025-06-09 RX ADMIN — INSULIN GLARGINE 14 UNITS: 100 INJECTION, SOLUTION SUBCUTANEOUS at 23:46

## 2025-06-09 RX ADMIN — LABETALOL HYDROCHLORIDE 10 MG: 5 INJECTION INTRAVENOUS at 00:09

## 2025-06-09 RX ADMIN — METRONIDAZOLE 500 MG: 500 TABLET ORAL at 08:03

## 2025-06-09 RX ADMIN — MORPHINE SULFATE 1 MG: 2 INJECTION, SOLUTION INTRAMUSCULAR; INTRAVENOUS at 19:46

## 2025-06-09 RX ADMIN — SODIUM CHLORIDE: 0.9 INJECTION, SOLUTION INTRAVENOUS at 08:18

## 2025-06-09 RX ADMIN — INSULIN LISPRO 4 UNITS: 100 INJECTION, SOLUTION INTRAVENOUS; SUBCUTANEOUS at 16:28

## 2025-06-09 RX ADMIN — INSULIN LISPRO 1 UNITS: 100 INJECTION, SOLUTION INTRAVENOUS; SUBCUTANEOUS at 16:27

## 2025-06-09 RX ADMIN — AMLODIPINE BESYLATE 10 MG: 10 TABLET ORAL at 08:03

## 2025-06-09 RX ADMIN — METOPROLOL TARTRATE 50 MG: 50 TABLET, FILM COATED ORAL at 08:03

## 2025-06-09 ASSESSMENT — PAIN DESCRIPTION - ORIENTATION
ORIENTATION: LOWER
ORIENTATION: LEFT
ORIENTATION: RIGHT;LEFT;MID

## 2025-06-09 ASSESSMENT — PAIN DESCRIPTION - DESCRIPTORS
DESCRIPTORS: ACHING;DISCOMFORT;CRAMPING
DESCRIPTORS: JABBING;SHARP;STABBING

## 2025-06-09 ASSESSMENT — PAIN SCALES - GENERAL
PAINLEVEL_OUTOF10: 5
PAINLEVEL_OUTOF10: 10

## 2025-06-09 ASSESSMENT — PAIN - FUNCTIONAL ASSESSMENT: PAIN_FUNCTIONAL_ASSESSMENT: ACTIVITIES ARE NOT PREVENTED

## 2025-06-09 ASSESSMENT — PAIN DESCRIPTION - LOCATION
LOCATION: ABDOMEN;BACK
LOCATION: BACK

## 2025-06-09 NOTE — PROGRESS NOTES
4 Eyes Skin Assessment     NAME:  Diana Mann  YOB: 1995  MEDICAL RECORD NUMBER:  [id number]    The patient is being assessed for  Admission    I agree that at least one RN has performed a thorough Head to Toe Skin Assessment on the patient. ALL assessment sites listed below have been assessed.      Areas assessed by both nurses:    Head, Face, Ears, Shoulders, Back, Chest, Arms, Elbows, Hands, Sacrum. Buttock, Coccyx, Ischium, Legs. Feet and Heels, and Under Medical Devices         Does the Patient have a Wound? No noted wound(s)       Gavino Prevention initiated by RN: No  Wound Care Orders initiated by RN: No    Pressure Injury (Stage 3,4, Unstageable, DTI, NWPT, and Complex wounds) if present, place Wound referral order by RN under : No    New Ostomies, if present place, Ostomy referral order under : No     Nurse 1 eSignature: Electronically signed by Jerry Bangura RN on 6/8/25 at 11:55 PM EDT    **SHARE this note so that the co-signing nurse can place an eSignature**    Nurse 2 eSignature: Electronically signed by Kelsey Vasquez RN on 6/9/25 at 6:41 AM EDT

## 2025-06-09 NOTE — ED NOTES
ED to Inpatient Handoff Report    Notified gunner garcia that electronic handoff available and patient ready for transport to room 636.    Safety Risks: None identified    Patient in Restraints: no    Constant Observer or Patient : no    Telemetry Monitoring Ordered: Yes     Cardiac Rhythm: Sinus rhythm    Order to transfer to unit without monitor: yes    Last MEWS:   Time completed: 2050    Deterioration Index: 22.05    Vitals:    06/08/25 1915 06/08/25 1952 06/08/25 2012 06/08/25 2023   BP: (!) 144/99 (!) 191/130 (!) 196/126 (!) 186/126   Pulse: (!) 109 (!) 119 (!) 110 (!) 110   Resp: (!) 34 28 13 15   Temp:       TempSrc:       SpO2:           Opportunity for questions and clarification was provided.

## 2025-06-09 NOTE — CARE COORDINATION
Pt is observation w/N&V. Chart review completed as pt has no needs identified for CM. Pt has a PCP and insurance, will follow.  RENETTA MedranoN, RN  [facility] Case Management  [phone]

## 2025-06-09 NOTE — PROGRESS NOTES
Spiritual Health History and Assessment/Progress Note  [facility]    Initial Encounter, Attempted Encounter,  ,  ,      Name: Diana Mann MRN: [id number]    Age: 29 y.o.     Sex: female   Language: English   Caodaism: Anglican   Cannabinoid hyperemesis syndrome     Date: 6/9/2025                           Spiritual Assessment began in [facility] INTERMEDIATE        Referral/Consult From: Rounding   Encounter Overview/Reason: Initial Encounter, Attempted Encounter  Service Provided For: Patient    Sammi, Belief, Meaning:   Patient unable to assess at this time  Family/Friends No family/friends present      Importance and Influence:  Patient unable to assess at this time  Family/Friends No family/friends present    Community:  Patient feels well-supported. Support system includes: Parent/s and Extended family  Family/Friends No family/friends present    Assessment and Plan of Care:     Patient Interventions include: Other: Patient at rest did not  disturb, prayer silently offered for the patient.  Family/Friends Interventions include: No family/friends present    Patient Plan of Care: Spiritual Care available upon further referral  Family/Friends Plan of Care: No family/friends present    Electronically signed by Chaplain Marcellus on 6/9/2025 at 12:02 PM

## 2025-06-09 NOTE — PROGRESS NOTES
[facility] Hospitalist Progress Note    Admitting Date and Time: 6/8/2025  2:43 PM  Admit Dx: Intractable abdominal pain [R10.9]  Intractable nausea and vomiting [R11.2]  Cannabinoid hyperemesis syndrome [R11.2, F12.90]    Subjective:  Patient is being followed for Intractable abdominal pain [R10.9]  Intractable nausea and vomiting [R11.2]  Cannabinoid hyperemesis syndrome [R11.2, F12.90]   Pt feels nausea is better, wants to eat, tolerating CLD.  Per RN: No major concerns.     ROS: denies fever, chills, cp, sob, n/v, HA unless stated above.      sodium chloride flush  5-40 mL IntraVENous 2 times per day    enoxaparin  30 mg SubCUTAneous Daily    metoprolol tartrate  50 mg Oral BID    amLODIPine  10 mg Oral Daily    insulin glargine  14 Units SubCUTAneous Nightly    lisinopril  10 mg Oral Daily    lubiprostone  8 mcg Oral BID WC    zolpidem  10 mg Oral Nightly    insulin lispro  0-4 Units SubCUTAneous 4x Daily AC & HS    insulin lispro  4 Units SubCUTAneous TID WC    lidocaine  1 patch TransDERmal Daily     sodium chloride flush, 5-40 mL, PRN  sodium chloride, , PRN  potassium chloride, 40 mEq, PRN   Or  potassium alternative oral replacement, 40 mEq, PRN   Or  potassium chloride, 10 mEq, PRN  magnesium sulfate, 2,000 mg, PRN  ondansetron, 4 mg, Q8H PRN   Or  ondansetron, 4 mg, Q6H PRN  polyethylene glycol, 17 g, Daily PRN  acetaminophen, 650 mg, Q6H PRN   Or  acetaminophen, 650 mg, Q6H PRN  glucose, 4 tablet, PRN  dextrose bolus, 125 mL, PRN   Or  dextrose bolus, 250 mL, PRN  glucagon (rDNA), 1 mg, PRN  dextrose, , Continuous PRN  ketorolac, 15 mg, Q6H PRN         Objective:    BP (!) 123/99   Pulse 98   Temp 98.1 °F (36.7 °C) (Oral)   Resp 18   Ht 1.549 m (5' 1\")   Wt 50.8 kg (112 lb)   LMP 05/12/2025   SpO2 100%   BMI 21.16 kg/m²     General Appearance: alert and oriented to person, place and time and in no acute distress  Skin: warm and dry  Head: normocephalic and atraumatic  Eyes: pupils

## 2025-06-09 NOTE — PROGRESS NOTES
Notified NP on pts behalf for 10/10 pain for the majority of the night NP dosent wish to give narcs alternatives given pt informed and refused IM norflex and lidocaine patches.

## 2025-06-09 NOTE — ACP (ADVANCE CARE PLANNING)
Advance Care Planning   Healthcare Decision Maker:    Primary Decision Maker: Edwina Mckay - [facility] - [phone]    Secondary Decision Maker (Active): Erich Mckay - [facility] - [phone]      Today we documented Decision Maker(s) consistent with Legal Next of Kin hierarchy.

## 2025-06-09 NOTE — PROGRESS NOTES
Patient requesting something for pain, wants something stronger than ordered pain regimen. Dr. Ruvalcaba notified

## 2025-06-09 NOTE — PROGRESS NOTES
[facility] Quality Flow/Interdisciplinary Rounds Progress Note        Quality Flow Rounds held on June 9, 2025    Disciplines Attending:  Bedside Nurse, , , and Nursing Unit Leadership    Diana Mann was admitted on 6/8/2025  2:43 PM    Anticipated Discharge Date:       Disposition:    Gavino Score:  Gavino Scale Score: 22    [facility] RISK OF UNPLANNED READMISSION 2.0             0 Total Score        Discussed patient goal for the day, patient clinical progression, and barriers to discharge.  The following Goal(s) of the Day/Commitment(s) have been identified:   discharge planning, saline 100cc/hour, PO flagyl      Frank Bello RN  June 9, 2025

## 2025-06-09 NOTE — H&P
[facility]ist [facility] History and Physical      CHIEF COMPLAINT: Intractable nausea and vomiting     History of Present Illness:  This is a 29 year old female with PMH significant for type I DM since age 13, cannabinoid hyperemesis syndrome, gastroparesis, chronic constipation, HTN, and recent trichomonas infection.  Patient recently admitted 5/25/25 and then again 6/02/25.  Last admit patient treated for nausea and vomiting related to cannabinoid hyperemesis syndrome, low back pain, and trichomonal infection.  Patient to ED secondary to nausea, vomiting, and generalized abdominal pain.  Patient reports that she has not used cannabis since her most recent discharge on 6/2/2025.  Patient currently denies nausea, vomiting, and abdominal pain.  Reports having lower back pain and rates pain 10/10.  Patient diffuse and nonspecific.  Patient also concerned she may be pregnant.  Unsure of medications and reports that she was unaware that she had to take all 3 blood pressure medications every day.  Reports having 2 doses of Flagyl left which should have been completed today.  Has been sexually active.  Wanting morphine for her back pain.  Denies fever, chills, shortness of breath, chest pain, nausea/vomiting, abdominal pain, and changes in her bowel/bladder habits.  Reports having bowel movement yesterday that was normal in consistency.    Labs remarkable for potassium 3.3, carbon dioxide 18, anion gap 23 and then 19, glucose 185 and now 358, troponin 16 and then 15, lactic acid 2.5 and then 1.4, beta hydroxybutyrate 2.76, and pH 7.6.  Urine showing trace ketones, protein, trace bacteria, and trace urine hemoglobin.  Urine tox positive for cannabinoid and oxycodone.  Will observe for additional evaluation and treatment.    Informant(s) for H&P: Patient and chart review    REVIEW OF SYSTEMS:  A comprehensive review of systems was negative except for: what is in the HPI      PMH:  Past Medical History:

## 2025-06-10 ENCOUNTER — TELEPHONE (OUTPATIENT)
Dept: ENDOCRINOLOGY | Age: 30
End: 2025-06-10

## 2025-06-10 VITALS
HEART RATE: 94 BPM | WEIGHT: 112 LBS | BODY MASS INDEX: 21.14 KG/M2 | SYSTOLIC BLOOD PRESSURE: 138 MMHG | DIASTOLIC BLOOD PRESSURE: 99 MMHG | OXYGEN SATURATION: 100 % | HEIGHT: 61 IN | TEMPERATURE: 98.8 F | RESPIRATION RATE: 18 BRPM

## 2025-06-10 LAB
ALBUMIN SERPL-MCNC: 3.2 G/DL (ref 3.5–5.2)
ALP SERPL-CCNC: 55 U/L (ref 35–104)
ALT SERPL-CCNC: 12 U/L (ref 0–32)
ANION GAP SERPL CALCULATED.3IONS-SCNC: 12 MMOL/L (ref 7–16)
AST SERPL-CCNC: 20 U/L (ref 0–31)
BASOPHILS # BLD: 0.04 K/UL (ref 0–0.2)
BASOPHILS NFR BLD: 1 % (ref 0–2)
BILIRUB SERPL-MCNC: 0.2 MG/DL (ref 0–1.2)
BUN SERPL-MCNC: 11 MG/DL (ref 6–20)
CALCIUM SERPL-MCNC: 8.2 MG/DL (ref 8.6–10.2)
CHLORIDE SERPL-SCNC: 104 MMOL/L (ref 98–107)
CO2 SERPL-SCNC: 22 MMOL/L (ref 22–29)
CREAT SERPL-MCNC: 0.9 MG/DL (ref 0.5–1)
EOSINOPHIL # BLD: 0.09 K/UL (ref 0.05–0.5)
EOSINOPHILS RELATIVE PERCENT: 1 % (ref 0–6)
ERYTHROCYTE [DISTWIDTH] IN BLOOD BY AUTOMATED COUNT: 14 % (ref 11.5–15)
GFR, ESTIMATED: 86 ML/MIN/1.73M2
GLUCOSE BLD-MCNC: 204 MG/DL (ref 74–99)
GLUCOSE SERPL-MCNC: 190 MG/DL (ref 74–99)
HCT VFR BLD AUTO: 27.9 % (ref 34–48)
HGB BLD-MCNC: 9.4 G/DL (ref 11.5–15.5)
IMM GRANULOCYTES # BLD AUTO: <0.03 K/UL (ref 0–0.58)
IMM GRANULOCYTES NFR BLD: 0 % (ref 0–5)
LYMPHOCYTES NFR BLD: 3.06 K/UL (ref 1.5–4)
LYMPHOCYTES RELATIVE PERCENT: 41 % (ref 20–42)
MCH RBC QN AUTO: 28.4 PG (ref 26–35)
MCHC RBC AUTO-ENTMCNC: 33.7 G/DL (ref 32–34.5)
MCV RBC AUTO: 84.3 FL (ref 80–99.9)
MONOCYTES NFR BLD: 0.44 K/UL (ref 0.1–0.95)
MONOCYTES NFR BLD: 6 % (ref 2–12)
NEUTROPHILS NFR BLD: 52 % (ref 43–80)
NEUTS SEG NFR BLD: 3.9 K/UL (ref 1.8–7.3)
PLATELET # BLD AUTO: 293 K/UL (ref 130–450)
PMV BLD AUTO: 10.8 FL (ref 7–12)
POTASSIUM SERPL-SCNC: 4.2 MMOL/L (ref 3.5–5)
PROT SERPL-MCNC: 5.8 G/DL (ref 6.4–8.3)
RBC # BLD AUTO: 3.31 M/UL (ref 3.5–5.5)
SODIUM SERPL-SCNC: 138 MMOL/L (ref 132–146)
WBC OTHER # BLD: 7.6 K/UL (ref 4.5–11.5)

## 2025-06-10 PROCEDURE — 80053 COMPREHEN METABOLIC PANEL: CPT

## 2025-06-10 PROCEDURE — 99239 HOSP IP/OBS DSCHRG MGMT >30: CPT | Performed by: STUDENT IN AN ORGANIZED HEALTH CARE EDUCATION/TRAINING PROGRAM

## 2025-06-10 PROCEDURE — 36415 COLL VENOUS BLD VENIPUNCTURE: CPT

## 2025-06-10 PROCEDURE — 6370000000 HC RX 637 (ALT 250 FOR IP): Performed by: NURSE PRACTITIONER

## 2025-06-10 PROCEDURE — 82962 GLUCOSE BLOOD TEST: CPT

## 2025-06-10 PROCEDURE — 85025 COMPLETE CBC W/AUTO DIFF WBC: CPT

## 2025-06-10 PROCEDURE — 2500000003 HC RX 250 WO HCPCS: Performed by: NURSE PRACTITIONER

## 2025-06-10 PROCEDURE — G0378 HOSPITAL OBSERVATION PER HR: HCPCS

## 2025-06-10 RX ORDER — LISINOPRIL 10 MG/1
10 TABLET ORAL DAILY
Qty: 30 TABLET | Refills: 3 | Status: SHIPPED | OUTPATIENT
Start: 2025-06-10

## 2025-06-10 RX ADMIN — LISINOPRIL 10 MG: 10 TABLET ORAL at 08:09

## 2025-06-10 RX ADMIN — LUBIPROSTONE 8 MCG: 8 CAPSULE, GELATIN COATED ORAL at 08:08

## 2025-06-10 RX ADMIN — INSULIN LISPRO 1 UNITS: 100 INJECTION, SOLUTION INTRAVENOUS; SUBCUTANEOUS at 08:08

## 2025-06-10 RX ADMIN — METOPROLOL TARTRATE 50 MG: 50 TABLET, FILM COATED ORAL at 08:08

## 2025-06-10 RX ADMIN — AMLODIPINE BESYLATE 10 MG: 10 TABLET ORAL at 08:09

## 2025-06-10 RX ADMIN — INSULIN LISPRO 4 UNITS: 100 INJECTION, SOLUTION INTRAVENOUS; SUBCUTANEOUS at 08:12

## 2025-06-10 RX ADMIN — SODIUM CHLORIDE, PRESERVATIVE FREE 10 ML: 5 INJECTION INTRAVENOUS at 08:12

## 2025-06-10 ASSESSMENT — PAIN DESCRIPTION - LOCATION: LOCATION: BACK

## 2025-06-10 ASSESSMENT — PAIN SCALES - GENERAL: PAINLEVEL_OUTOF10: 10

## 2025-06-10 ASSESSMENT — PAIN DESCRIPTION - ORIENTATION: ORIENTATION: RIGHT;LEFT;MID

## 2025-06-10 NOTE — TELEPHONE ENCOUNTER
Pt phnd for refill on her omnipod 5 - pt still using [facility]/[address]  Please call pt when RX has been sent to the pharmacy [phone]

## 2025-06-10 NOTE — PROGRESS NOTES
[facility] Quality Flow/Interdisciplinary Rounds Progress Note        Quality Flow Rounds held on Karen 10, 2025    Disciplines Attending:  Bedside Nurse, , , and Nursing Unit Leadership    Diana Mann was admitted on 6/8/2025  2:43 PM    Anticipated Discharge Date:       Disposition:    Gavino Score:  Gavino Scale Score: 22    [facility] RISK OF UNPLANNED READMISSION 2.0             0 Total Score        Discussed patient goal for the day, patient clinical progression, and barriers to discharge.  The following Goal(s) of the Day/Commitment(s) have been identified:   discharge      Frank Bello RN  Karen 10, 2025

## 2025-06-10 NOTE — PLAN OF CARE
Problem: Chronic Conditions and Co-morbidities  Goal: Patient's chronic conditions and co-morbidity symptoms are monitored and maintained or improved  6/10/2025 0904 by Sol Crisostomo RN  Outcome: Progressing  6/10/2025 0501 by Tania Lagunas RN  Outcome: Progressing     Problem: Pain  Goal: Verbalizes/displays adequate comfort level or baseline comfort level  6/10/2025 0904 by Sol Crisostomo RN  Outcome: Progressing  6/10/2025 0501 by Tania Lagunas RN  Outcome: Progressing

## 2025-06-10 NOTE — DISCHARGE SUMMARY
[facility] Hospitalist Physician Discharge Summary       Opal Iyer MD  [address] [facility] [address]  [phone]    Schedule an appointment as soon as possible for a visit in 1 week(s)      Opal Iyer MD            Activity level: As tolerated     Dispo: Home      Condition on discharge: Stable     Patient ID:  Diana Mann  [id number]  29 y.o.  1995    Admit date: 6/8/2025    Discharge date and time:  6/10/2025  3:54 PM    Admission Diagnoses: Principal Problem:    Cannabinoid hyperemesis syndrome  Active Problems:    Poorly controlled type 1 diabetes mellitus (HCC)    Primary hypertension  Resolved Problems:    * No resolved hospital problems. *      Discharge Diagnoses: Principal Problem:    Cannabinoid hyperemesis syndrome  Active Problems:    Poorly controlled type 1 diabetes mellitus (HCC)    Primary hypertension  Resolved Problems:    * No resolved hospital problems. *      Consults:  None    Hospital Course:   Patient Diana Mann is a 29 y.o. presented with Intractable abdominal pain [R10.9]  Intractable nausea and vomiting [R11.2]  Cannabinoid hyperemesis syndrome [R11.2, F12.90]  atient was admitted and managed for abdominal pain, n/v: Suspected 2/2 THC use. Last THC use was yesterday. UDS + cannabinoid. Pain/nausea control. IVF's, CLD, ADAT. Low back pain: UA and CT A/P fairly unremarkable.  Pain control, lidocaine patch. Pelvic Cyst: Outpt F/U with GYN and pelvic US needed. Trichomonas+: on Flagyl.      Discharge Exam:  General Appearance: alert and oriented to person, place and time and in no acute distress  Skin: warm and dry  Head: normocephalic and atraumatic  Eyes: pupils equal, round, and reactive to light, extraocular eye movements intact, conjunctivae normal  Neck: neck supple and non tender without mass   Pulmonary/Chest: clear to auscultation bilaterally- no wheezes, rales or rhonchi, normal air movement, no respiratory

## 2025-06-13 DIAGNOSIS — E10.65 TYPE 1 DIABETES MELLITUS WITH HYPERGLYCEMIA (HCC): ICD-10-CM

## 2025-06-13 RX ORDER — INSULIN PMP CART,AUT,G6/7,CNTR
EACH SUBCUTANEOUS
Qty: 30 EACH | Refills: 3 | Status: SHIPPED | OUTPATIENT
Start: 2025-06-13

## 2025-06-16 ENCOUNTER — TELEPHONE (OUTPATIENT)
Dept: ENDOCRINOLOGY | Age: 30
End: 2025-06-16

## 2025-07-08 DIAGNOSIS — F51.01 PRIMARY INSOMNIA: Primary | ICD-10-CM

## 2025-07-08 RX ORDER — ZOLPIDEM TARTRATE 10 MG/1
10 TABLET ORAL NIGHTLY
Qty: 60 TABLET | Refills: 0 | Status: SHIPPED | OUTPATIENT
Start: 2025-07-08 | End: 2025-09-06

## 2025-07-08 RX ORDER — LISINOPRIL 10 MG/1
10 TABLET ORAL DAILY
Qty: 30 TABLET | Refills: 3 | Status: SHIPPED | OUTPATIENT
Start: 2025-07-08

## 2025-07-08 RX ORDER — AMLODIPINE BESYLATE 10 MG/1
10 TABLET ORAL DAILY
Qty: 30 TABLET | Refills: 0 | Status: SHIPPED | OUTPATIENT
Start: 2025-07-08

## 2025-07-08 NOTE — TELEPHONE ENCOUNTER
Patient requesting a refill of the following      lisinopril (PRINIVIL;ZESTRIL) 10 MG tablet     amLODIPine (NORVASC) 10 MG tablet     zolpidem (AMBIEN) 10 MG tablet   Please send to [facility] on [address]     Patient is scheduled to see Dr. Branch on 8-. Thank you.

## 2025-07-09 ENCOUNTER — OFFICE VISIT (OUTPATIENT)
Dept: ENDOCRINOLOGY | Age: 30
End: 2025-07-09
Payer: COMMERCIAL

## 2025-07-09 VITALS
SYSTOLIC BLOOD PRESSURE: 121 MMHG | HEART RATE: 85 BPM | DIASTOLIC BLOOD PRESSURE: 85 MMHG | RESPIRATION RATE: 20 BRPM | HEIGHT: 61 IN | TEMPERATURE: 98.6 F | WEIGHT: 113.2 LBS | BODY MASS INDEX: 21.37 KG/M2 | OXYGEN SATURATION: 100 %

## 2025-07-09 DIAGNOSIS — E10.43 DIABETIC GASTROPARESIS ASSOCIATED WITH TYPE 1 DIABETES MELLITUS (HCC): ICD-10-CM

## 2025-07-09 DIAGNOSIS — E78.5 HYPERLIPIDEMIA, UNSPECIFIED HYPERLIPIDEMIA TYPE: ICD-10-CM

## 2025-07-09 DIAGNOSIS — E10.65 TYPE 1 DIABETES MELLITUS WITH HYPERGLYCEMIA (HCC): Primary | ICD-10-CM

## 2025-07-09 DIAGNOSIS — Z91.119 DIETARY NONCOMPLIANCE: ICD-10-CM

## 2025-07-09 DIAGNOSIS — E10.21 TYPE 1 DIABETES MELLITUS WITH DIABETIC NEPHROPATHY (HCC): ICD-10-CM

## 2025-07-09 DIAGNOSIS — K31.84 DIABETIC GASTROPARESIS ASSOCIATED WITH TYPE 1 DIABETES MELLITUS (HCC): ICD-10-CM

## 2025-07-09 DIAGNOSIS — E55.9 VITAMIN D DEFICIENCY: ICD-10-CM

## 2025-07-09 PROCEDURE — 99214 OFFICE O/P EST MOD 30 MIN: CPT | Performed by: FAMILY MEDICINE

## 2025-07-09 PROCEDURE — 3079F DIAST BP 80-89 MM HG: CPT | Performed by: FAMILY MEDICINE

## 2025-07-09 PROCEDURE — 3074F SYST BP LT 130 MM HG: CPT | Performed by: FAMILY MEDICINE

## 2025-07-09 PROCEDURE — 3046F HEMOGLOBIN A1C LEVEL >9.0%: CPT | Performed by: FAMILY MEDICINE

## 2025-07-09 PROCEDURE — G8427 DOCREV CUR MEDS BY ELIG CLIN: HCPCS | Performed by: FAMILY MEDICINE

## 2025-07-09 PROCEDURE — G8420 CALC BMI NORM PARAMETERS: HCPCS | Performed by: FAMILY MEDICINE

## 2025-07-09 PROCEDURE — 95251 CONT GLUC MNTR ANALYSIS I&R: CPT | Performed by: FAMILY MEDICINE

## 2025-07-09 PROCEDURE — 2022F DILAT RTA XM EVC RTNOPTHY: CPT | Performed by: FAMILY MEDICINE

## 2025-07-09 PROCEDURE — 1036F TOBACCO NON-USER: CPT | Performed by: FAMILY MEDICINE

## 2025-07-09 RX ORDER — ACYCLOVIR 400 MG/1
TABLET ORAL
Qty: 9 EACH | Refills: 3 | Status: SHIPPED | OUTPATIENT
Start: 2025-07-09

## 2025-07-09 RX ORDER — INSULIN LISPRO 100 [IU]/ML
INJECTION, SOLUTION INTRAVENOUS; SUBCUTANEOUS
Qty: 30 ML | Refills: 11 | Status: SHIPPED | OUTPATIENT
Start: 2025-07-09

## 2025-07-09 NOTE — PROGRESS NOTES
disintegrating tablet Take 1 tablet by mouth 3 times daily as needed for Nausea or Vomiting 21 tablet 0    Continuous Glucose Transmitter (DEXCOM G6 TRANSMITTER) MISC Change every 90 days 1 each 3    Continuous Glucose Sensor (DEXCOM G6 SENSOR) MISC Change every 10 days 9 each 3    metoprolol tartrate (LOPRESSOR) 50 MG tablet Take 1 tablet by mouth 2 times daily 180 tablet 0    Glucose (TRUEPLUS) 15 GM/32ML GEL Take 32 mLs by mouth as needed (low sugar) 5 each 0    Insulin Disposable Pump (OMNIPOD 5 G6 INTRO, GEN 5,) KIT To use as directed 1 kit 0     No current facility-administered medications for this visit.       Review of Systems  Constitutional: No fever, no chills, no diaphoresis, no generalized weakness.  HEENT: No blurred vision, No sore throat, no ear pain, no hair loss  Neck: denied any neck swelling, difficulty swallowing,   Cardio-pulmonary: No CP, SOB or palpitation, No orthopnea or PND. No cough or wheezing.  GI: No N/V/D, no constipation, No abdominal pain, no melena or hematochezia   : Denied any dysuria, hematuria, flank pain, discharge, or incontinence.   Skin: denied any rash, ulcer, Hirsute, or hyperpigmentation.   MSK: denied any joint deformity, joint pain/swelling, muscle pain, or back pain.  Neuro: + numbness and tingling in lower extremities.     OBJECTIVE    /85 (BP Site: Right Upper Arm, Patient Position: Sitting, BP Cuff Size: Small Adult)   Pulse 85   Temp 98.6 °F (37 °C)   Resp 20   Ht 1.549 m (5' 1\")   Wt 51.3 kg (113 lb 3.2 oz)   SpO2 100%   BMI 21.39 kg/m²   BP Readings from Last 4 Encounters:   07/09/25 121/85   06/10/25 (!) 138/99   06/02/25 (!) 153/113   05/25/25 (!) 150/110     Wt Readings from Last 6 Encounters:   07/09/25 51.3 kg (113 lb 3.2 oz)   06/08/25 50.8 kg (112 lb)   06/01/25 51.4 kg (113 lb 5.1 oz)   05/23/25 53.6 kg (118 lb 2.7 oz)   05/22/25 53.5 kg (118 lb)   05/14/25 53.1 kg (117 lb)       Physical examination:  Due to this being a TeleHealth

## 2025-07-22 ENCOUNTER — TELEPHONE (OUTPATIENT)
Dept: PRIMARY CARE CLINIC | Age: 30
End: 2025-07-22

## 2025-07-22 NOTE — TELEPHONE ENCOUNTER
Patient requesting a letter for her employer  Stating she has diabetes and high blood pressure. 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  States she needs to take a lot of restroom breaks and needs to sit when she gets light headed. States her employer will provide her with a chair and also extra bathroom breaks if she is able to provide them with a letter stating she has high blood pressure and diabetes. Patient is scheduled with Dr. Branch 8-. Please call patient with status of letter at [phone]. Thank you.

## 2025-07-23 NOTE — TELEPHONE ENCOUNTER
Patient is welcome to get a letter from her endocrinologist for her diabetes. I cannot provide a letter for her until she establishes with me.     This was conveyed verbally to office staff.

## 2025-07-30 ENCOUNTER — APPOINTMENT (OUTPATIENT)
Dept: ULTRASOUND IMAGING | Age: 30
End: 2025-07-30
Payer: COMMERCIAL

## 2025-07-30 ENCOUNTER — HOSPITAL ENCOUNTER (EMERGENCY)
Age: 30
Discharge: HOME | End: 2025-07-30
Attending: EMERGENCY MEDICINE
Payer: COMMERCIAL

## 2025-07-30 VITALS
TEMPERATURE: 98.1 F | BODY MASS INDEX: 21.35 KG/M2 | RESPIRATION RATE: 16 BRPM | WEIGHT: 113 LBS | SYSTOLIC BLOOD PRESSURE: 163 MMHG | DIASTOLIC BLOOD PRESSURE: 107 MMHG | OXYGEN SATURATION: 97 % | HEART RATE: 91 BPM

## 2025-07-30 DIAGNOSIS — I10 HYPERTENSION, UNSPECIFIED TYPE: ICD-10-CM

## 2025-07-30 DIAGNOSIS — E87.20 LACTIC ACIDOSIS: ICD-10-CM

## 2025-07-30 DIAGNOSIS — R10.9 ABDOMINAL PAIN, UNSPECIFIED ABDOMINAL LOCATION: Primary | ICD-10-CM

## 2025-07-30 DIAGNOSIS — N83.202 CYST OF LEFT OVARY: ICD-10-CM

## 2025-07-30 LAB
ALBUMIN SERPL-MCNC: 4.1 G/DL (ref 3.5–5.2)
ALP SERPL-CCNC: 74 U/L (ref 35–104)
ALT SERPL-CCNC: 24 U/L (ref 0–35)
ANION GAP SERPL CALCULATED.3IONS-SCNC: 17 MMOL/L (ref 7–16)
AST SERPL-CCNC: 29 U/L (ref 0–35)
B-HCG SERPL EIA 3RD IS-ACNC: <0.2 MIU/ML (ref 0–7)
B-OH-BUTYR SERPL-MCNC: 0.7 MMOL/L (ref 0.02–0.27)
BASOPHILS # BLD: 0.04 K/UL (ref 0–0.2)
BASOPHILS NFR BLD: 0 % (ref 0–2)
BILIRUB SERPL-MCNC: 0.4 MG/DL (ref 0–1.2)
BUN SERPL-MCNC: 14 MG/DL (ref 6–20)
CALCIUM SERPL-MCNC: 10.1 MG/DL (ref 8.6–10)
CHLORIDE SERPL-SCNC: 107 MMOL/L (ref 98–107)
CO2 SERPL-SCNC: 18 MMOL/L (ref 22–29)
CREAT SERPL-MCNC: 1 MG/DL (ref 0.5–1)
EOSINOPHIL # BLD: 0.01 K/UL (ref 0.05–0.5)
EOSINOPHILS RELATIVE PERCENT: 0 % (ref 0–6)
ERYTHROCYTE [DISTWIDTH] IN BLOOD BY AUTOMATED COUNT: 13.1 % (ref 11.5–15)
GFR, ESTIMATED: 82 ML/MIN/1.73M2
GLUCOSE SERPL-MCNC: 166 MG/DL (ref 74–99)
HCT VFR BLD AUTO: 34.5 % (ref 34–48)
HGB BLD-MCNC: 12.1 G/DL (ref 11.5–15.5)
IMM GRANULOCYTES # BLD AUTO: 0.06 K/UL (ref 0–0.58)
IMM GRANULOCYTES NFR BLD: 0 % (ref 0–5)
LACTATE BLDV-SCNC: 3.1 MMOL/L (ref 0.5–2.2)
LIPASE SERPL-CCNC: 11 U/L (ref 13–60)
LYMPHOCYTES NFR BLD: 2.1 K/UL (ref 1.5–4)
LYMPHOCYTES RELATIVE PERCENT: 14 % (ref 20–42)
MCH RBC QN AUTO: 28.1 PG (ref 26–35)
MCHC RBC AUTO-ENTMCNC: 35.1 G/DL (ref 32–34.5)
MCV RBC AUTO: 80.2 FL (ref 80–99.9)
MONOCYTES NFR BLD: 0.33 K/UL (ref 0.1–0.95)
MONOCYTES NFR BLD: 2 % (ref 2–12)
NEUTROPHILS NFR BLD: 83 % (ref 43–80)
NEUTS SEG NFR BLD: 12.12 K/UL (ref 1.8–7.3)
PH VENOUS: 7.56 (ref 7.35–7.45)
PLATELET # BLD AUTO: 313 K/UL (ref 130–450)
PMV BLD AUTO: 10.7 FL (ref 7–12)
POTASSIUM SERPL-SCNC: 3.8 MMOL/L (ref 3.5–5.1)
PROT SERPL-MCNC: 8.5 G/DL (ref 6.4–8.3)
RBC # BLD AUTO: 4.3 M/UL (ref 3.5–5.5)
SODIUM SERPL-SCNC: 143 MMOL/L (ref 136–145)
WBC OTHER # BLD: 14.7 K/UL (ref 4.5–11.5)

## 2025-07-30 PROCEDURE — 99284 EMERGENCY DEPT VISIT MOD MDM: CPT

## 2025-07-30 PROCEDURE — 82010 KETONE BODYS QUAN: CPT

## 2025-07-30 PROCEDURE — 76830 TRANSVAGINAL US NON-OB: CPT

## 2025-07-30 PROCEDURE — 96375 TX/PRO/DX INJ NEW DRUG ADDON: CPT

## 2025-07-30 PROCEDURE — 96361 HYDRATE IV INFUSION ADD-ON: CPT

## 2025-07-30 PROCEDURE — 83605 ASSAY OF LACTIC ACID: CPT

## 2025-07-30 PROCEDURE — 83690 ASSAY OF LIPASE: CPT

## 2025-07-30 PROCEDURE — 93975 VASCULAR STUDY: CPT

## 2025-07-30 PROCEDURE — 85025 COMPLETE CBC W/AUTO DIFF WBC: CPT

## 2025-07-30 PROCEDURE — 2580000003 HC RX 258: Performed by: EMERGENCY MEDICINE

## 2025-07-30 PROCEDURE — 82800 BLOOD PH: CPT

## 2025-07-30 PROCEDURE — 6360000002 HC RX W HCPCS: Performed by: EMERGENCY MEDICINE

## 2025-07-30 PROCEDURE — 96376 TX/PRO/DX INJ SAME DRUG ADON: CPT

## 2025-07-30 PROCEDURE — 96374 THER/PROPH/DIAG INJ IV PUSH: CPT

## 2025-07-30 PROCEDURE — 84702 CHORIONIC GONADOTROPIN TEST: CPT

## 2025-07-30 PROCEDURE — 80053 COMPREHEN METABOLIC PANEL: CPT

## 2025-07-30 RX ORDER — MORPHINE SULFATE 4 MG/ML
4 INJECTION, SOLUTION INTRAMUSCULAR; INTRAVENOUS ONCE
Refills: 0 | Status: COMPLETED | OUTPATIENT
Start: 2025-07-30 | End: 2025-07-30

## 2025-07-30 RX ORDER — DICYCLOMINE HYDROCHLORIDE 10 MG/1
10 CAPSULE ORAL
Qty: 120 CAPSULE | Refills: 0 | Status: SHIPPED | OUTPATIENT
Start: 2025-07-30

## 2025-07-30 RX ORDER — 0.9 % SODIUM CHLORIDE 0.9 %
1000 INTRAVENOUS SOLUTION INTRAVENOUS ONCE
Status: COMPLETED | OUTPATIENT
Start: 2025-07-30 | End: 2025-07-30

## 2025-07-30 RX ORDER — ONDANSETRON 2 MG/ML
4 INJECTION INTRAMUSCULAR; INTRAVENOUS ONCE
Status: COMPLETED | OUTPATIENT
Start: 2025-07-30 | End: 2025-07-30

## 2025-07-30 RX ADMIN — MORPHINE SULFATE 4 MG: 4 INJECTION, SOLUTION INTRAMUSCULAR; INTRAVENOUS at 16:16

## 2025-07-30 RX ADMIN — SODIUM CHLORIDE 1000 ML: 0.9 INJECTION, SOLUTION INTRAVENOUS at 18:31

## 2025-07-30 RX ADMIN — MORPHINE SULFATE 4 MG: 4 INJECTION, SOLUTION INTRAMUSCULAR; INTRAVENOUS at 17:10

## 2025-07-30 RX ADMIN — ONDANSETRON 4 MG: 2 INJECTION, SOLUTION INTRAMUSCULAR; INTRAVENOUS at 16:17

## 2025-07-30 RX ADMIN — SODIUM CHLORIDE 1000 ML: 0.9 INJECTION, SOLUTION INTRAVENOUS at 17:14

## 2025-07-30 ASSESSMENT — PAIN SCALES - GENERAL
PAINLEVEL_OUTOF10: 10
PAINLEVEL_OUTOF10: 10
PAINLEVEL_OUTOF10: 8
PAINLEVEL_OUTOF10: 10

## 2025-07-30 ASSESSMENT — PAIN DESCRIPTION - DESCRIPTORS
DESCRIPTORS: SORE;SHARP
DESCRIPTORS: CRAMPING;SHOOTING

## 2025-07-30 ASSESSMENT — PAIN DESCRIPTION - LOCATION
LOCATION: ABDOMEN

## 2025-07-30 ASSESSMENT — PAIN - FUNCTIONAL ASSESSMENT
PAIN_FUNCTIONAL_ASSESSMENT: 0-10
PAIN_FUNCTIONAL_ASSESSMENT: 0-10

## 2025-07-30 NOTE — ED PROVIDER NOTES
Department of Emergency Medicine   ED  Provider Note  Admit Date/RoomTime: 2025  3:27 PM  ED Room:         Written by: Antonina Pa DO  Patient Name: Diana Mann  Attending Provider: No att. providers found  Admit Date: 2025  3:27 PM  MRN: [id number]    : 1995      History of Present Illness:  25, Time: 5:07 PM EDT  Chief Complaint   Patient presents with    Abdominal Pain    Nausea    Vomiting           HPI   Patient is a 29 y.o. {female with a past medical history of diabetes, dehydration, ovarian cysts, cannabis hyperemesis presenting to the emergency department for abdominal pain nausea and vomiting.  She states symptoms started this morning.  She states she woke up today with diffuse abdominal pain.  She states has been nauseous as well and unable to keep anything down.  Some bloody, nonbilious emesis.  She states her blood sugars have been stable.  She has a history of ovarian cyst but she states this feels similar.  Denies any fevers but states she felt chilled today.  Denies any chest pain or shortness of breath.  Denies any numbness, tingling, weakness.        Review of Systems:   A complete review of systems was performed and pertinent positives and negatives are stated within HPI, all other systems reviewed and are negative.        --------------------------------------------- PAST HISTORY ---------------------------------------------  Past Medical History:  has a past medical history of Acidosis, Bleeding at insertion site, Common femoral artery injury, right, initial encounter, COVID-19 virus infection, Depressive disorder, Diabetic ketoacidosis (HCC), DM type 1 (diabetes mellitus, type 1) (HCC), Lactic acid acidosis, Marijuana abuse, Non-compliance, Primary hypertension, Pyelonephritis, and Trichimoniasis.    Past Surgical History:  has a past surgical history that includes Abdomen surgery (N/A, 2019) and Bartholin gland cyst excision.    Social

## 2025-07-30 NOTE — DISCHARGE INSTRUCTIONS
US DUP ABD PEL RETRO SCROT COMPLETE   Final Result   No evidence of ovarian torsion.         US NON OB TRANSVAGINAL   Final Result   1. 1.8 cm complex cyst in the left ovary.   2. Mild free fluid on the right side of the cul-de-sac.   3. No evidence of ovarian torsion.           Establish care with primary care provider.  Follow-up with them as well as endocrinology.  Have your blood pressure rechecked.  Return to the ER immediately for new, or Sartini symptoms.  Follow-up with your OB/GYN

## 2025-08-01 ENCOUNTER — APPOINTMENT (OUTPATIENT)
Dept: CT IMAGING | Age: 30
End: 2025-08-01
Payer: COMMERCIAL

## 2025-08-01 ENCOUNTER — HOSPITAL ENCOUNTER (EMERGENCY)
Age: 30
Discharge: HOME | End: 2025-08-01
Attending: EMERGENCY MEDICINE
Payer: COMMERCIAL

## 2025-08-01 VITALS
RESPIRATION RATE: 16 BRPM | SYSTOLIC BLOOD PRESSURE: 180 MMHG | OXYGEN SATURATION: 98 % | TEMPERATURE: 97.7 F | HEART RATE: 91 BPM | DIASTOLIC BLOOD PRESSURE: 117 MMHG

## 2025-08-01 DIAGNOSIS — K52.9 ENTERITIS: ICD-10-CM

## 2025-08-01 DIAGNOSIS — F12.10 CANNABIS ABUSE, CONTINUOUS USE: ICD-10-CM

## 2025-08-01 DIAGNOSIS — R10.84 GENERALIZED ABDOMINAL PAIN: Primary | ICD-10-CM

## 2025-08-01 DIAGNOSIS — R11.2 NAUSEA AND VOMITING, UNSPECIFIED VOMITING TYPE: ICD-10-CM

## 2025-08-01 LAB
ALBUMIN SERPL-MCNC: 3.7 G/DL (ref 3.5–5.2)
ALP SERPL-CCNC: 58 U/L (ref 35–104)
ALT SERPL-CCNC: 13 U/L (ref 0–35)
AMPHET UR QL SCN: NEGATIVE
ANION GAP SERPL CALCULATED.3IONS-SCNC: 16 MMOL/L (ref 7–16)
ANION GAP SERPL CALCULATED.3IONS-SCNC: 18 MMOL/L (ref 7–16)
AST SERPL-CCNC: 22 U/L (ref 0–35)
B-OH-BUTYR SERPL-MCNC: 1.39 MMOL/L (ref 0.02–0.27)
BARBITURATES UR QL SCN: NEGATIVE
BASOPHILS # BLD: 0.04 K/UL (ref 0–0.2)
BASOPHILS NFR BLD: 0 % (ref 0–2)
BENZODIAZ UR QL: NEGATIVE
BILIRUB SERPL-MCNC: 0.2 MG/DL (ref 0–1.2)
BILIRUB UR QL STRIP: NEGATIVE
BUN SERPL-MCNC: 10 MG/DL (ref 6–20)
BUN SERPL-MCNC: 12 MG/DL (ref 6–20)
BUPRENORPHINE UR QL: NEGATIVE
CALCIUM SERPL-MCNC: 8.8 MG/DL (ref 8.6–10)
CALCIUM SERPL-MCNC: 9.5 MG/DL (ref 8.6–10)
CANNABINOIDS UR QL SCN: POSITIVE
CHLORIDE SERPL-SCNC: 105 MMOL/L (ref 98–107)
CHLORIDE SERPL-SCNC: 107 MMOL/L (ref 98–107)
CLARITY UR: CLEAR
CO2 SERPL-SCNC: 17 MMOL/L (ref 22–29)
CO2 SERPL-SCNC: 18 MMOL/L (ref 22–29)
COCAINE UR QL SCN: NEGATIVE
COLOR UR: YELLOW
CREAT SERPL-MCNC: 0.9 MG/DL (ref 0.5–1)
CREAT SERPL-MCNC: 1 MG/DL (ref 0.5–1)
EKG ATRIAL RATE: 94 BPM
EKG P AXIS: 74 DEGREES
EKG P-R INTERVAL: 152 MS
EKG Q-T INTERVAL: 364 MS
EKG QRS DURATION: 66 MS
EKG QTC CALCULATION (BAZETT): 455 MS
EKG R AXIS: 64 DEGREES
EKG T AXIS: 52 DEGREES
EKG VENTRICULAR RATE: 94 BPM
EOSINOPHIL # BLD: 0.05 K/UL (ref 0.05–0.5)
EOSINOPHILS RELATIVE PERCENT: 0 % (ref 0–6)
ERYTHROCYTE [DISTWIDTH] IN BLOOD BY AUTOMATED COUNT: 13.2 % (ref 11.5–15)
FENTANYL UR QL: NEGATIVE
GFR, ESTIMATED: 84 ML/MIN/1.73M2
GFR, ESTIMATED: >90 ML/MIN/1.73M2
GLUCOSE BLD-MCNC: 110 MG/DL
GLUCOSE BLD-MCNC: 110 MG/DL (ref 74–99)
GLUCOSE SERPL-MCNC: 111 MG/DL (ref 74–99)
GLUCOSE SERPL-MCNC: 198 MG/DL (ref 74–99)
GLUCOSE UR STRIP-MCNC: NEGATIVE MG/DL
HCG SERPL QL: NEGATIVE
HCT VFR BLD AUTO: 30.4 % (ref 34–48)
HGB BLD-MCNC: 10.8 G/DL (ref 11.5–15.5)
HGB UR QL STRIP.AUTO: ABNORMAL
IMM GRANULOCYTES # BLD AUTO: 0.04 K/UL (ref 0–0.58)
IMM GRANULOCYTES NFR BLD: 0 % (ref 0–5)
KETONES UR STRIP-MCNC: NEGATIVE MG/DL
LACTATE BLDV-SCNC: 1.3 MMOL/L (ref 0.5–2.2)
LEUKOCYTE ESTERASE UR QL STRIP: NEGATIVE
LIPASE SERPL-CCNC: 13 U/L (ref 13–60)
LYMPHOCYTES NFR BLD: 2.01 K/UL (ref 1.5–4)
LYMPHOCYTES RELATIVE PERCENT: 16 % (ref 20–42)
MAGNESIUM SERPL-MCNC: 1.7 MG/DL (ref 1.6–2.6)
MCH RBC QN AUTO: 28.3 PG (ref 26–35)
MCHC RBC AUTO-ENTMCNC: 35.5 G/DL (ref 32–34.5)
MCV RBC AUTO: 79.8 FL (ref 80–99.9)
METHADONE UR QL: NEGATIVE
MONOCYTES NFR BLD: 0.28 K/UL (ref 0.1–0.95)
MONOCYTES NFR BLD: 2 % (ref 2–12)
NEUTROPHILS NFR BLD: 81 % (ref 43–80)
NEUTS SEG NFR BLD: 10.06 K/UL (ref 1.8–7.3)
NITRITE UR QL STRIP: NEGATIVE
OPIATES UR QL SCN: NEGATIVE
OXYCODONE UR QL SCN: NEGATIVE
PCP UR QL SCN: NEGATIVE
PH UR STRIP: 7.5 [PH] (ref 5–8)
PH VENOUS: 7.58 (ref 7.35–7.45)
PLATELET # BLD AUTO: 291 K/UL (ref 130–450)
PMV BLD AUTO: 10.7 FL (ref 7–12)
POTASSIUM SERPL-SCNC: 3.4 MMOL/L (ref 3.5–5.1)
POTASSIUM SERPL-SCNC: 3.6 MMOL/L (ref 3.5–5.1)
PROT SERPL-MCNC: 7.3 G/DL (ref 6.4–8.3)
PROT UR STRIP-MCNC: 100 MG/DL
RBC # BLD AUTO: 3.81 M/UL (ref 3.5–5.5)
RBC #/AREA URNS HPF: ABNORMAL /HPF
SODIUM SERPL-SCNC: 140 MMOL/L (ref 136–145)
SODIUM SERPL-SCNC: 140 MMOL/L (ref 136–145)
SP GR UR STRIP: 1.02 (ref 1–1.03)
TEST INFORMATION: ABNORMAL
UROBILINOGEN UR STRIP-ACNC: 0.2 EU/DL (ref 0–1)
WBC #/AREA URNS HPF: ABNORMAL /HPF
WBC OTHER # BLD: 12.5 K/UL (ref 4.5–11.5)

## 2025-08-01 PROCEDURE — 2500000003 HC RX 250 WO HCPCS: Performed by: EMERGENCY MEDICINE

## 2025-08-01 PROCEDURE — 87086 URINE CULTURE/COLONY COUNT: CPT

## 2025-08-01 PROCEDURE — 85025 COMPLETE CBC W/AUTO DIFF WBC: CPT

## 2025-08-01 PROCEDURE — 87427 SHIGA-LIKE TOXIN AG IA: CPT

## 2025-08-01 PROCEDURE — 99285 EMERGENCY DEPT VISIT HI MDM: CPT

## 2025-08-01 PROCEDURE — 2580000003 HC RX 258: Performed by: EMERGENCY MEDICINE

## 2025-08-01 PROCEDURE — 96374 THER/PROPH/DIAG INJ IV PUSH: CPT

## 2025-08-01 PROCEDURE — 82962 GLUCOSE BLOOD TEST: CPT

## 2025-08-01 PROCEDURE — 81001 URINALYSIS AUTO W/SCOPE: CPT

## 2025-08-01 PROCEDURE — 84703 CHORIONIC GONADOTROPIN ASSAY: CPT

## 2025-08-01 PROCEDURE — 83735 ASSAY OF MAGNESIUM: CPT

## 2025-08-01 PROCEDURE — 74177 CT ABD & PELVIS W/CONTRAST: CPT

## 2025-08-01 PROCEDURE — 87425 ROTAVIRUS AG IA: CPT

## 2025-08-01 PROCEDURE — 82800 BLOOD PH: CPT

## 2025-08-01 PROCEDURE — 6360000004 HC RX CONTRAST MEDICATION: Performed by: RADIOLOGY

## 2025-08-01 PROCEDURE — 96361 HYDRATE IV INFUSION ADD-ON: CPT

## 2025-08-01 PROCEDURE — 83690 ASSAY OF LIPASE: CPT

## 2025-08-01 PROCEDURE — 82010 KETONE BODYS QUAN: CPT

## 2025-08-01 PROCEDURE — 83605 ASSAY OF LACTIC ACID: CPT

## 2025-08-01 PROCEDURE — 96376 TX/PRO/DX INJ SAME DRUG ADON: CPT

## 2025-08-01 PROCEDURE — 6360000002 HC RX W HCPCS: Performed by: EMERGENCY MEDICINE

## 2025-08-01 PROCEDURE — 93005 ELECTROCARDIOGRAM TRACING: CPT | Performed by: EMERGENCY MEDICINE

## 2025-08-01 PROCEDURE — 80048 BASIC METABOLIC PNL TOTAL CA: CPT

## 2025-08-01 PROCEDURE — 80307 DRUG TEST PRSMV CHEM ANLYZR: CPT

## 2025-08-01 PROCEDURE — 87045 FECES CULTURE AEROBIC BACT: CPT

## 2025-08-01 PROCEDURE — 96375 TX/PRO/DX INJ NEW DRUG ADDON: CPT

## 2025-08-01 PROCEDURE — 93010 ELECTROCARDIOGRAM REPORT: CPT | Performed by: INTERNAL MEDICINE

## 2025-08-01 PROCEDURE — 87077 CULTURE AEROBIC IDENTIFY: CPT

## 2025-08-01 PROCEDURE — 87046 STOOL CULTR AEROBIC BACT EA: CPT

## 2025-08-01 PROCEDURE — 80053 COMPREHEN METABOLIC PANEL: CPT

## 2025-08-01 RX ORDER — 0.9 % SODIUM CHLORIDE 0.9 %
1000 INTRAVENOUS SOLUTION INTRAVENOUS ONCE
Status: COMPLETED | OUTPATIENT
Start: 2025-08-01 | End: 2025-08-01

## 2025-08-01 RX ORDER — MORPHINE SULFATE 4 MG/ML
4 INJECTION, SOLUTION INTRAMUSCULAR; INTRAVENOUS ONCE
Refills: 0 | Status: COMPLETED | OUTPATIENT
Start: 2025-08-01 | End: 2025-08-01

## 2025-08-01 RX ORDER — DIPHENHYDRAMINE HYDROCHLORIDE 50 MG/ML
25 INJECTION, SOLUTION INTRAMUSCULAR; INTRAVENOUS ONCE
Status: COMPLETED | OUTPATIENT
Start: 2025-08-01 | End: 2025-08-01

## 2025-08-01 RX ORDER — IOPAMIDOL 755 MG/ML
75 INJECTION, SOLUTION INTRAVASCULAR
Status: COMPLETED | OUTPATIENT
Start: 2025-08-01 | End: 2025-08-01

## 2025-08-01 RX ORDER — PROMETHAZINE HYDROCHLORIDE 25 MG/1
25 TABLET ORAL EVERY 6 HOURS PRN
Qty: 10 TABLET | Refills: 0 | Status: SHIPPED | OUTPATIENT
Start: 2025-08-01 | End: 2025-08-08

## 2025-08-01 RX ORDER — DROPERIDOL 2.5 MG/ML
1.25 INJECTION, SOLUTION INTRAMUSCULAR; INTRAVENOUS ONCE
Status: COMPLETED | OUTPATIENT
Start: 2025-08-01 | End: 2025-08-01

## 2025-08-01 RX ORDER — DROPERIDOL 2.5 MG/ML
0.62 INJECTION, SOLUTION INTRAMUSCULAR; INTRAVENOUS ONCE
Status: COMPLETED | OUTPATIENT
Start: 2025-08-01 | End: 2025-08-01

## 2025-08-01 RX ORDER — PANTOPRAZOLE SODIUM 40 MG/1
40 TABLET, DELAYED RELEASE ORAL DAILY
Qty: 10 TABLET | Refills: 0 | Status: SHIPPED | OUTPATIENT
Start: 2025-08-01 | End: 2025-08-11

## 2025-08-01 RX ADMIN — MORPHINE SULFATE 4 MG: 4 INJECTION, SOLUTION INTRAMUSCULAR; INTRAVENOUS at 11:26

## 2025-08-01 RX ADMIN — DROPERIDOL 1.25 MG: 2.5 INJECTION, SOLUTION INTRAMUSCULAR; INTRAVENOUS at 11:19

## 2025-08-01 RX ADMIN — SODIUM CHLORIDE 1000 ML: 0.9 INJECTION, SOLUTION INTRAVENOUS at 11:17

## 2025-08-01 RX ADMIN — MORPHINE SULFATE 4 MG: 4 INJECTION, SOLUTION INTRAMUSCULAR; INTRAVENOUS at 15:09

## 2025-08-01 RX ADMIN — DIPHENHYDRAMINE HYDROCHLORIDE 25 MG: 50 INJECTION INTRAMUSCULAR; INTRAVENOUS at 11:24

## 2025-08-01 RX ADMIN — SODIUM CHLORIDE 1000 ML: 0.9 INJECTION, SOLUTION INTRAVENOUS at 11:21

## 2025-08-01 RX ADMIN — SODIUM CHLORIDE 1000 ML: 0.9 INJECTION, SOLUTION INTRAVENOUS at 11:30

## 2025-08-01 RX ADMIN — FAMOTIDINE 20 MG: 10 INJECTION, SOLUTION INTRAVENOUS at 11:20

## 2025-08-01 RX ADMIN — IOPAMIDOL 75 ML: 755 INJECTION, SOLUTION INTRAVENOUS at 13:44

## 2025-08-01 RX ADMIN — DROPERIDOL 0.62 MG: 2.5 INJECTION, SOLUTION INTRAMUSCULAR; INTRAVENOUS at 15:08

## 2025-08-01 ASSESSMENT — PAIN SCALES - GENERAL
PAINLEVEL_OUTOF10: 10
PAINLEVEL_OUTOF10: 10

## 2025-08-01 ASSESSMENT — ENCOUNTER SYMPTOMS
NAUSEA: 1
DIARRHEA: 1
VOMITING: 1

## 2025-08-01 ASSESSMENT — PAIN DESCRIPTION - LOCATION: LOCATION: ABDOMEN;BACK

## 2025-08-01 NOTE — DISCHARGE INSTRUCTIONS
Call family doctor and specialist to schedule an appointment    Have your doctor obtain copies of all results and records and re-review them with you    Please take your papers with you to all followup appointments    If symptoms reoccur or worsen or if you believe you need emergency services for any other reason return to [facility]      CT ABDOMEN PELVIS W IV CONTRAST Additional Contrast? None   Final Result   1. Gas and fluid distension of the stomach and proximal jejunum compatible   with ileus. The colon shows very little stool content which may reflect   recent diarrhea related illness.  The overall findings are in keeping with   gastroenteritis.  A prior history of cannabinoid hyperemesis syndrome is also   provided.  Please correlate clinically.   2. The previously seen right ovarian cyst has resolved.  Recently seen   posterior cul-de-sac free fluid is increased in volume and may reflect   sequela of cyst rupture. No findings of ovarian torsion by recent ultrasound   exam.   3. Borderline thickening of the urinary bladder wall and potentially cystitis.   4. The pancreas is atrophic and this correlates with the provided history of   type 1 diabetes.

## 2025-08-01 NOTE — ED PROVIDER NOTES
Dehydration     Nausea, vomiting and diarrhea 12/14/2020    Dehydration     Nausea and vomiting     Dehydration     Diabetic ketoacidosis with coma associated with type 1 diabetes mellitus (HCC) 05/31/2023    Dehydration 06/02/2023    Type 1 diabetes mellitus with ketoacidosis without coma (HCC) 06/17/2024     Past Medical History:   Diagnosis Date    Diabetic ketoacidosis (HCC)     DM type 1 (diabetes mellitus, type 1) (HCC)     Marijuana abuse     Non-compliance     Trichimoniasis 11/19/2021         CONSULTS: (Who and What was discussed)  None    Discussion with Other Profesionals : None    Social Determinants : None    Records Reviewed : Outpatient Notes    No results found for: \"LVEF\", \"LVEFMODE\"        CC/HPI Summary, DDx, ED Course, and Reassessment:   Patient presented for nausea and abdominal pain and diarrhea.  Patient had a bowel movement here which was normal.  She has history of DKA and cannabis induced cyclic vomiting.  Differential diagnosis includes enteritis gastritis peptic ulcer disease duodenal ulcer disease pancreatitis colitis diverticulitis cannabis induced cyclic vomiting, DKA to name a few.  She was given 3 L of fluid IV antiemetics pain medicine.  Initial laboratory investigation showed anion gap elevation with mildly low bicarb.  She was not acidotic however with her pH.  After IV fluids she had her labs repeated and gap normalized.  Bicarb improved.  She felt markedly improved CT showed no acute findings.  She was able to tolerate p.o.  She will be discharged with recommendations to follow-up with gastroenterology family doctor and endocrinology.  She is to call today.  She is to change her OmniPod.  She verbalized understanding of instructions she will be discharged    Disposition Considerations (tests considered but not done, Shared Decision Making, Pt Expectation of Test or Tx.):   Appropriate for outpatient management        I am the Primary Clinician of Record.    FINAL IMPRESSION

## 2025-08-02 ENCOUNTER — HOSPITAL ENCOUNTER (EMERGENCY)
Age: 30
Discharge: HOME | End: 2025-08-02
Attending: EMERGENCY MEDICINE
Payer: COMMERCIAL

## 2025-08-02 VITALS
SYSTOLIC BLOOD PRESSURE: 149 MMHG | HEART RATE: 75 BPM | DIASTOLIC BLOOD PRESSURE: 76 MMHG | RESPIRATION RATE: 18 BRPM | OXYGEN SATURATION: 99 % | TEMPERATURE: 98.2 F

## 2025-08-02 DIAGNOSIS — R10.84 GENERALIZED ABDOMINAL PAIN: Primary | ICD-10-CM

## 2025-08-02 LAB
ALBUMIN SERPL-MCNC: 4 G/DL (ref 3.5–5.2)
ALP SERPL-CCNC: 74 U/L (ref 35–104)
ALT SERPL-CCNC: 10 U/L (ref 0–35)
ANION GAP SERPL CALCULATED.3IONS-SCNC: 17 MMOL/L (ref 7–16)
ANION GAP SERPL CALCULATED.3IONS-SCNC: 24 MMOL/L (ref 7–16)
APAP SERPL-MCNC: <5 UG/ML (ref 10–30)
AST SERPL-CCNC: 23 U/L (ref 0–35)
B-OH-BUTYR SERPL-MCNC: 3.72 MMOL/L (ref 0.02–0.27)
BASOPHILS # BLD: 0.07 K/UL (ref 0–0.2)
BASOPHILS NFR BLD: 0 % (ref 0–2)
BILIRUB DIRECT SERPL-MCNC: 0.2 MG/DL (ref 0–0.2)
BILIRUB INDIRECT SERPL-MCNC: 0.4 MG/DL (ref 0–1)
BILIRUB SERPL-MCNC: 0.6 MG/DL (ref 0–1.2)
BUN SERPL-MCNC: 13 MG/DL (ref 6–20)
BUN SERPL-MCNC: 14 MG/DL (ref 6–20)
CALCIUM SERPL-MCNC: 10.1 MG/DL (ref 8.6–10)
CALCIUM SERPL-MCNC: 9 MG/DL (ref 8.6–10)
CHLORIDE SERPL-SCNC: 102 MMOL/L (ref 98–107)
CHLORIDE SERPL-SCNC: 106 MMOL/L (ref 98–107)
CO2 SERPL-SCNC: 14 MMOL/L (ref 22–29)
CO2 SERPL-SCNC: 14 MMOL/L (ref 22–29)
CREAT SERPL-MCNC: 1 MG/DL (ref 0.5–1)
CREAT SERPL-MCNC: 1.1 MG/DL (ref 0.5–1)
EOSINOPHIL # BLD: 0.04 K/UL (ref 0.05–0.5)
EOSINOPHILS RELATIVE PERCENT: 0 % (ref 0–6)
ERYTHROCYTE [DISTWIDTH] IN BLOOD BY AUTOMATED COUNT: 13.2 % (ref 11.5–15)
ETHANOLAMINE SERPL-MCNC: <10 MG/DL (ref 0–0.08)
GFR, ESTIMATED: 71 ML/MIN/1.73M2
GFR, ESTIMATED: 76 ML/MIN/1.73M2
GLUCOSE SERPL-MCNC: 106 MG/DL (ref 74–99)
GLUCOSE SERPL-MCNC: 124 MG/DL (ref 74–99)
HCG SERPL QL: NEGATIVE
HCG, URINE, POC: NEGATIVE
HCT VFR BLD AUTO: 38.1 % (ref 34–48)
HGB BLD-MCNC: 13 G/DL (ref 11.5–15.5)
IMM GRANULOCYTES # BLD AUTO: 0.06 K/UL (ref 0–0.58)
IMM GRANULOCYTES NFR BLD: 0 % (ref 0–5)
LIPASE SERPL-CCNC: 11 U/L (ref 13–60)
LYMPHOCYTES NFR BLD: 3.44 K/UL (ref 1.5–4)
LYMPHOCYTES RELATIVE PERCENT: 19 % (ref 20–42)
Lab: NORMAL
MCH RBC QN AUTO: 27.8 PG (ref 26–35)
MCHC RBC AUTO-ENTMCNC: 34.1 G/DL (ref 32–34.5)
MCV RBC AUTO: 81.6 FL (ref 80–99.9)
MONOCYTES NFR BLD: 0.83 K/UL (ref 0.1–0.95)
MONOCYTES NFR BLD: 5 % (ref 2–12)
NEGATIVE QC PASS/FAIL: NORMAL
NEUTROPHILS NFR BLD: 75 % (ref 43–80)
NEUTS SEG NFR BLD: 13.32 K/UL (ref 1.8–7.3)
PH VENOUS: 7.53 (ref 7.35–7.45)
PLATELET # BLD AUTO: 345 K/UL (ref 130–450)
PMV BLD AUTO: 10.9 FL (ref 7–12)
POSITIVE QC PASS/FAIL: NORMAL
POTASSIUM SERPL-SCNC: 3.5 MMOL/L (ref 3.5–5.1)
POTASSIUM SERPL-SCNC: 3.5 MMOL/L (ref 3.5–5.1)
PROT SERPL-MCNC: 8.4 G/DL (ref 6.4–8.3)
RBC # BLD AUTO: 4.67 M/UL (ref 3.5–5.5)
ROTAVIRUS ANTIGEN: NEGATIVE
SALICYLATES SERPL-MCNC: <0.5 MG/DL (ref 0–30)
SODIUM SERPL-SCNC: 136 MMOL/L (ref 136–145)
SODIUM SERPL-SCNC: 141 MMOL/L (ref 136–145)
SOURCE, 60200063: NORMAL
TOXIC TRICYCLIC SC,BLOOD: NEGATIVE
WBC OTHER # BLD: 17.8 K/UL (ref 4.5–11.5)

## 2025-08-02 PROCEDURE — 99284 EMERGENCY DEPT VISIT MOD MDM: CPT

## 2025-08-02 PROCEDURE — 96374 THER/PROPH/DIAG INJ IV PUSH: CPT

## 2025-08-02 PROCEDURE — 84703 CHORIONIC GONADOTROPIN ASSAY: CPT

## 2025-08-02 PROCEDURE — 2500000003 HC RX 250 WO HCPCS: Performed by: EMERGENCY MEDICINE

## 2025-08-02 PROCEDURE — 6370000000 HC RX 637 (ALT 250 FOR IP): Performed by: EMERGENCY MEDICINE

## 2025-08-02 PROCEDURE — 80307 DRUG TEST PRSMV CHEM ANLYZR: CPT

## 2025-08-02 PROCEDURE — 82800 BLOOD PH: CPT

## 2025-08-02 PROCEDURE — 82010 KETONE BODYS QUAN: CPT

## 2025-08-02 PROCEDURE — 85025 COMPLETE CBC W/AUTO DIFF WBC: CPT

## 2025-08-02 PROCEDURE — 80048 BASIC METABOLIC PNL TOTAL CA: CPT

## 2025-08-02 PROCEDURE — 36415 COLL VENOUS BLD VENIPUNCTURE: CPT

## 2025-08-02 PROCEDURE — 96375 TX/PRO/DX INJ NEW DRUG ADDON: CPT

## 2025-08-02 PROCEDURE — 83690 ASSAY OF LIPASE: CPT

## 2025-08-02 PROCEDURE — 80179 DRUG ASSAY SALICYLATE: CPT

## 2025-08-02 PROCEDURE — 6360000002 HC RX W HCPCS: Performed by: EMERGENCY MEDICINE

## 2025-08-02 PROCEDURE — 80053 COMPREHEN METABOLIC PANEL: CPT

## 2025-08-02 PROCEDURE — 2580000003 HC RX 258: Performed by: EMERGENCY MEDICINE

## 2025-08-02 PROCEDURE — G0480 DRUG TEST DEF 1-7 CLASSES: HCPCS

## 2025-08-02 PROCEDURE — 82248 BILIRUBIN DIRECT: CPT

## 2025-08-02 PROCEDURE — 80143 DRUG ASSAY ACETAMINOPHEN: CPT

## 2025-08-02 PROCEDURE — 96361 HYDRATE IV INFUSION ADD-ON: CPT

## 2025-08-02 RX ORDER — PROCHLORPERAZINE EDISYLATE 5 MG/ML
10 INJECTION INTRAMUSCULAR; INTRAVENOUS ONCE
Status: COMPLETED | OUTPATIENT
Start: 2025-08-02 | End: 2025-08-02

## 2025-08-02 RX ORDER — MORPHINE SULFATE 4 MG/ML
4 INJECTION, SOLUTION INTRAMUSCULAR; INTRAVENOUS ONCE
Refills: 0 | Status: COMPLETED | OUTPATIENT
Start: 2025-08-02 | End: 2025-08-02

## 2025-08-02 RX ORDER — 0.9 % SODIUM CHLORIDE 0.9 %
1000 INTRAVENOUS SOLUTION INTRAVENOUS ONCE
Status: COMPLETED | OUTPATIENT
Start: 2025-08-02 | End: 2025-08-02

## 2025-08-02 RX ORDER — PROCHLORPERAZINE MALEATE 10 MG
10 TABLET ORAL EVERY 6 HOURS PRN
Qty: 30 TABLET | Refills: 1 | Status: SHIPPED | OUTPATIENT
Start: 2025-08-02

## 2025-08-02 RX ORDER — DIPHENHYDRAMINE HYDROCHLORIDE 50 MG/ML
25 INJECTION, SOLUTION INTRAMUSCULAR; INTRAVENOUS ONCE
Status: COMPLETED | OUTPATIENT
Start: 2025-08-02 | End: 2025-08-02

## 2025-08-02 RX ORDER — PANTOPRAZOLE SODIUM 40 MG/1
40 TABLET, DELAYED RELEASE ORAL
Qty: 30 TABLET | Refills: 0 | Status: SHIPPED | OUTPATIENT
Start: 2025-08-02

## 2025-08-02 RX ADMIN — DIPHENHYDRAMINE HYDROCHLORIDE 25 MG: 50 INJECTION INTRAMUSCULAR; INTRAVENOUS at 15:41

## 2025-08-02 RX ADMIN — PROCHLORPERAZINE EDISYLATE 10 MG: 5 INJECTION INTRAMUSCULAR; INTRAVENOUS at 15:41

## 2025-08-02 RX ADMIN — SODIUM CHLORIDE 1000 ML: 9 INJECTION, SOLUTION INTRAVENOUS at 16:33

## 2025-08-02 RX ADMIN — SODIUM CHLORIDE 1000 ML: 9 INJECTION, SOLUTION INTRAVENOUS at 15:41

## 2025-08-02 RX ADMIN — MORPHINE SULFATE 4 MG: 4 INJECTION, SOLUTION INTRAMUSCULAR; INTRAVENOUS at 15:40

## 2025-08-02 RX ADMIN — FAMOTIDINE 20 MG: 10 INJECTION, SOLUTION INTRAVENOUS at 18:10

## 2025-08-02 RX ADMIN — LIDOCAINE HYDROCHLORIDE: 20 SOLUTION ORAL; TOPICAL at 18:11

## 2025-08-02 ASSESSMENT — PAIN - FUNCTIONAL ASSESSMENT: PAIN_FUNCTIONAL_ASSESSMENT: 0-10

## 2025-08-02 ASSESSMENT — PAIN SCALES - GENERAL: PAINLEVEL_OUTOF10: 10

## 2025-08-02 NOTE — ED PROVIDER NOTES
[facility] EMERGENCY DEPARTMENT  EMERGENCY DEPARTMENT ENCOUNTER        Pt Name: Diana Mann  MRN: [id number]  Birthdate 1995  Date of evaluation: 8/2/2025  Provider: Ursula Anaya DO  PCP: No primary care provider on file.  Note Started: 3:54 PM EDT 8/2/25    CHIEF COMPLAINT       Chief Complaint   Patient presents with    Abdominal Pain     Here multiple times in the past week for pain and nausea caused by an ovarian cyst per patient.        HISTORY OF PRESENT ILLNESS: 1 or more Elements   History From: Patient    Limitations to history : None    Diana Mann is a 29 y.o. female who presents with concern for generalized abdominal pain, nausea and vomiting.  Notes that has been ongoing for about the past week or so, this is her third time in the emergency room for the same.  She does smoke marijuana but has not smoked anything over the past few days.  She has not been having diarrhea constipation, no vaginal bleeding, discharge or other associated pain.  Notes that her pain is mainly in her mid epigastric region.  No fevers or chills, no other associated complaints.      EXTERNAL NOTE REVIEW:      On chart review last endocrinology for follow-up on type 1 diabetes on 7/9/2025    REVIEW OF SYSTEMS :      Positives and Pertinent negatives as per HPI.     SURGICAL HISTORY     Past Surgical History:   Procedure Laterality Date    ABDOMEN SURGERY N/A 5/9/2019    INCISION AND DRAINAGE OF SUPRAPUBIC ABSESS performed by Keaton JUAREZ MD at [facility] OR    BARTHOLIN GLAND CYST EXCISION      last yr       CURRENTMEDICATIONS       Discharge Medication List as of 8/2/2025  6:54 PM        CONTINUE these medications which have NOT CHANGED    Details   bismuth subsalicylate (PEPTO-BISMOL) 262 MG/15ML suspension Take 15 mLs by mouth every 6 hours as needed for Indigestion, Cramping, Diarrhea or Nausea, Disp-325 mL, R-0Normal      !! pantoprazole (PROTONIX) 40 MG tablet Take 1 tablet by  times daily, Disp-180 tablet, R-0Normal      Glucose (TRUEPLUS) 15 GM/32ML GEL Take 32 mLs by mouth as needed (low sugar), Disp-5 each, R-0Normal      Insulin Disposable Pump (OMNIPOD 5 G6 INTRO, GEN 5,) KIT To use as directed, Disp-1 kit, R-0Normal       !! - Potential duplicate medications found. Please discuss with provider.          ALLERGIES     Patient has no known allergies.    FAMILYHISTORY       Family History   Problem Relation Age of Onset    Diabetes Maternal Grandmother     Diabetes Paternal Grandmother     Stroke Other     Thyroid Disease Neg Hx         SOCIAL HISTORY       Social History     Tobacco Use    Smoking status: Never     Passive exposure: Never    Smokeless tobacco: Never    Tobacco comments:     Smokes marijuana   Vaping Use    Vaping status: Never Used   Substance Use Topics    Alcohol use: No    Drug use: Yes     Frequency: 14.0 times per week     Types: Marijuana (Weed)     Comment: 2 uses daily       SCREENINGS        Julio Coma Scale  Eye Opening: Spontaneous  Best Verbal Response: Oriented  Best Motor Response: Obeys commands  Julio Coma Scale Score: 15                CIWA Assessment  BP: (!) 149/76  Pulse: 75           PHYSICAL EXAM  1 or more Elements     ED Triage Vitals [08/02/25 1524]   BP Systolic BP Percentile Diastolic BP Percentile Temp Temp Source Pulse Respirations SpO2   (!) 160/89 -- -- 98.2 °F (36.8 °C) Oral 80 18 99 %      Height Weight         -- --                    Constitutional/General: Alert and oriented x3, uncomfortable appearing, afebrile  Head: Normocephalic and atraumatic  Eyes: PERRL, EOMI, conjunctiva normal, sclera non icteric  ENT:  Oropharynx clear, handling secretions, no trismus, no asymmetry of the posterior oropharynx or uvular edema  Neck: Supple, full ROM, no stridor, no meningeal signs  Respiratory: Lungs clear to auscultation bilaterally, no wheezes, rales, or rhonchi. Not in respiratory distress  Cardiovascular:  Regular rate. Regular

## 2025-08-03 ENCOUNTER — APPOINTMENT (OUTPATIENT)
Dept: CT IMAGING | Age: 30
End: 2025-08-03
Payer: COMMERCIAL

## 2025-08-03 ENCOUNTER — HOSPITAL ENCOUNTER (EMERGENCY)
Age: 30
Discharge: HOME | End: 2025-08-03
Attending: EMERGENCY MEDICINE
Payer: COMMERCIAL

## 2025-08-03 VITALS
SYSTOLIC BLOOD PRESSURE: 176 MMHG | DIASTOLIC BLOOD PRESSURE: 107 MMHG | RESPIRATION RATE: 22 BRPM | TEMPERATURE: 98.9 F | OXYGEN SATURATION: 99 % | HEART RATE: 91 BPM

## 2025-08-03 DIAGNOSIS — R11.2 CANNABINOID HYPEREMESIS SYNDROME: Primary | ICD-10-CM

## 2025-08-03 DIAGNOSIS — F12.90 CANNABINOID HYPEREMESIS SYNDROME: Primary | ICD-10-CM

## 2025-08-03 DIAGNOSIS — N30.00 ACUTE CYSTITIS WITHOUT HEMATURIA: ICD-10-CM

## 2025-08-03 LAB
ALBUMIN SERPL-MCNC: 3.6 G/DL (ref 3.5–5.2)
ALP SERPL-CCNC: 64 U/L (ref 35–104)
ALT SERPL-CCNC: 10 U/L (ref 0–35)
AMPHET UR QL SCN: NEGATIVE
ANION GAP SERPL CALCULATED.3IONS-SCNC: 13 MMOL/L (ref 7–16)
ANION GAP SERPL CALCULATED.3IONS-SCNC: 19 MMOL/L (ref 7–16)
AST SERPL-CCNC: 23 U/L (ref 0–35)
B-OH-BUTYR SERPL-MCNC: 1.86 MMOL/L (ref 0.02–0.27)
BACTERIA URNS QL MICRO: ABNORMAL
BARBITURATES UR QL SCN: NEGATIVE
BASOPHILS # BLD: 0.05 K/UL (ref 0–0.2)
BASOPHILS NFR BLD: 0 % (ref 0–2)
BENZODIAZ UR QL: NEGATIVE
BILIRUB SERPL-MCNC: 0.4 MG/DL (ref 0–1.2)
BILIRUB UR QL STRIP: NEGATIVE
BUN SERPL-MCNC: 11 MG/DL (ref 6–20)
BUN SERPL-MCNC: 9 MG/DL (ref 6–20)
BUPRENORPHINE UR QL: NEGATIVE
CALCIUM SERPL-MCNC: 8.2 MG/DL (ref 8.6–10)
CALCIUM SERPL-MCNC: 9.5 MG/DL (ref 8.6–10)
CANNABINOIDS UR QL SCN: POSITIVE
CHLORIDE SERPL-SCNC: 108 MMOL/L (ref 98–107)
CHLORIDE SERPL-SCNC: 109 MMOL/L (ref 98–107)
CLARITY UR: CLEAR
CO2 SERPL-SCNC: 18 MMOL/L (ref 22–29)
CO2 SERPL-SCNC: 18 MMOL/L (ref 22–29)
COCAINE UR QL SCN: NEGATIVE
COLOR UR: YELLOW
CREAT SERPL-MCNC: 0.9 MG/DL (ref 0.5–1)
CREAT SERPL-MCNC: 1 MG/DL (ref 0.5–1)
CREAT UR-MCNC: 33.9 MG/DL (ref 29–226)
EKG ATRIAL RATE: 104 BPM
EKG P AXIS: 66 DEGREES
EKG P-R INTERVAL: 142 MS
EKG Q-T INTERVAL: 328 MS
EKG QRS DURATION: 64 MS
EKG QTC CALCULATION (BAZETT): 431 MS
EKG R AXIS: 53 DEGREES
EKG T AXIS: 37 DEGREES
EKG VENTRICULAR RATE: 104 BPM
EOSINOPHIL # BLD: 0.06 K/UL (ref 0.05–0.5)
EOSINOPHILS RELATIVE PERCENT: 1 % (ref 0–6)
EPI CELLS #/AREA URNS HPF: ABNORMAL /HPF
ERYTHROCYTE [DISTWIDTH] IN BLOOD BY AUTOMATED COUNT: 13.1 % (ref 11.5–15)
FENTANYL UR QL: NEGATIVE
GFR, ESTIMATED: 77 ML/MIN/1.73M2
GFR, ESTIMATED: >90 ML/MIN/1.73M2
GLUCOSE SERPL-MCNC: 115 MG/DL (ref 74–99)
GLUCOSE SERPL-MCNC: 163 MG/DL (ref 74–99)
GLUCOSE UR STRIP-MCNC: NEGATIVE MG/DL
HCT VFR BLD AUTO: 33.7 % (ref 34–48)
HGB BLD-MCNC: 11.7 G/DL (ref 11.5–15.5)
HGB UR QL STRIP.AUTO: ABNORMAL
IMM GRANULOCYTES # BLD AUTO: 0.03 K/UL (ref 0–0.58)
IMM GRANULOCYTES NFR BLD: 0 % (ref 0–5)
KETONES UR STRIP-MCNC: ABNORMAL MG/DL
LEUKOCYTE ESTERASE UR QL STRIP: ABNORMAL
LYMPHOCYTES NFR BLD: 2.83 K/UL (ref 1.5–4)
LYMPHOCYTES RELATIVE PERCENT: 23 % (ref 20–42)
MAGNESIUM SERPL-MCNC: 1.7 MG/DL (ref 1.6–2.6)
MCH RBC QN AUTO: 28.4 PG (ref 26–35)
MCHC RBC AUTO-ENTMCNC: 34.7 G/DL (ref 32–34.5)
MCV RBC AUTO: 81.8 FL (ref 80–99.9)
METHADONE UR QL: NEGATIVE
MICROALBUMIN UR-MCNC: 1048 MG/L (ref 0–20)
MICROALBUMIN/CREAT UR-RTO: 3091 MCG/MG CREAT (ref 0–30)
MICROORGANISM SPEC CULT: ABNORMAL
MICROORGANISM SPEC CULT: NORMAL
MICROORGANISM SPEC CULT: NORMAL
MONOCYTES NFR BLD: 0.44 K/UL (ref 0.1–0.95)
MONOCYTES NFR BLD: 4 % (ref 2–12)
NEUTROPHILS NFR BLD: 72 % (ref 43–80)
NEUTS SEG NFR BLD: 8.9 K/UL (ref 1.8–7.3)
NITRITE UR QL STRIP: POSITIVE
OPIATES UR QL SCN: POSITIVE
OXYCODONE UR QL SCN: NEGATIVE
PCP UR QL SCN: NEGATIVE
PH UR STRIP: 7 [PH] (ref 5–8)
PH VENOUS: 7.52 (ref 7.35–7.45)
PLATELET # BLD AUTO: 324 K/UL (ref 130–450)
PMV BLD AUTO: 11.1 FL (ref 7–12)
POTASSIUM SERPL-SCNC: 3.4 MMOL/L (ref 3.5–5.1)
POTASSIUM SERPL-SCNC: 3.6 MMOL/L (ref 3.5–5.1)
PROT SERPL-MCNC: 7.4 G/DL (ref 6.4–8.3)
PROT UR STRIP-MCNC: 100 MG/DL
RBC # BLD AUTO: 4.12 M/UL (ref 3.5–5.5)
RBC #/AREA URNS HPF: ABNORMAL /HPF
SERVICE CMNT-IMP: ABNORMAL
SODIUM SERPL-SCNC: 140 MMOL/L (ref 136–145)
SODIUM SERPL-SCNC: 144 MMOL/L (ref 136–145)
SP GR UR STRIP: 1.02 (ref 1–1.03)
SPECIMEN DESCRIPTION: ABNORMAL
SPECIMEN DESCRIPTION: NORMAL
TEST INFORMATION: ABNORMAL
UROBILINOGEN UR STRIP-ACNC: 0.2 EU/DL (ref 0–1)
WBC #/AREA URNS HPF: ABNORMAL /HPF
WBC OTHER # BLD: 12.3 K/UL (ref 4.5–11.5)

## 2025-08-03 PROCEDURE — 6360000002 HC RX W HCPCS: Performed by: EMERGENCY MEDICINE

## 2025-08-03 PROCEDURE — 96365 THER/PROPH/DIAG IV INF INIT: CPT

## 2025-08-03 PROCEDURE — 81001 URINALYSIS AUTO W/SCOPE: CPT

## 2025-08-03 PROCEDURE — 82043 UR ALBUMIN QUANTITATIVE: CPT

## 2025-08-03 PROCEDURE — 83735 ASSAY OF MAGNESIUM: CPT

## 2025-08-03 PROCEDURE — 96376 TX/PRO/DX INJ SAME DRUG ADON: CPT

## 2025-08-03 PROCEDURE — 6360000002 HC RX W HCPCS

## 2025-08-03 PROCEDURE — 99284 EMERGENCY DEPT VISIT MOD MDM: CPT

## 2025-08-03 PROCEDURE — 82010 KETONE BODYS QUAN: CPT

## 2025-08-03 PROCEDURE — 82570 ASSAY OF URINE CREATININE: CPT

## 2025-08-03 PROCEDURE — 2580000003 HC RX 258: Performed by: EMERGENCY MEDICINE

## 2025-08-03 PROCEDURE — 80307 DRUG TEST PRSMV CHEM ANLYZR: CPT

## 2025-08-03 PROCEDURE — 96375 TX/PRO/DX INJ NEW DRUG ADDON: CPT

## 2025-08-03 PROCEDURE — 85025 COMPLETE CBC W/AUTO DIFF WBC: CPT

## 2025-08-03 PROCEDURE — 2500000003 HC RX 250 WO HCPCS

## 2025-08-03 PROCEDURE — 82800 BLOOD PH: CPT

## 2025-08-03 PROCEDURE — 80048 BASIC METABOLIC PNL TOTAL CA: CPT

## 2025-08-03 PROCEDURE — 6360000004 HC RX CONTRAST MEDICATION: Performed by: RADIOLOGY

## 2025-08-03 PROCEDURE — 80053 COMPREHEN METABOLIC PANEL: CPT

## 2025-08-03 RX ORDER — DIPHENHYDRAMINE HCL 25 MG
25 TABLET ORAL ONCE
Status: DISCONTINUED | OUTPATIENT
Start: 2025-08-03 | End: 2025-08-03 | Stop reason: HOSPADM

## 2025-08-03 RX ORDER — DIPHENHYDRAMINE HYDROCHLORIDE 50 MG/ML
INJECTION, SOLUTION INTRAMUSCULAR; INTRAVENOUS
Status: COMPLETED
Start: 2025-08-03 | End: 2025-08-03

## 2025-08-03 RX ORDER — SUCRALFATE 1 G/1
1 TABLET ORAL 4 TIMES DAILY
Qty: 120 TABLET | Refills: 0 | Status: SHIPPED | OUTPATIENT
Start: 2025-08-03

## 2025-08-03 RX ORDER — DROPERIDOL 2.5 MG/ML
1.25 INJECTION, SOLUTION INTRAMUSCULAR; INTRAVENOUS ONCE
Status: COMPLETED | OUTPATIENT
Start: 2025-08-03 | End: 2025-08-03

## 2025-08-03 RX ORDER — MORPHINE SULFATE 2 MG/ML
2 INJECTION, SOLUTION INTRAMUSCULAR; INTRAVENOUS ONCE
Status: COMPLETED | OUTPATIENT
Start: 2025-08-03 | End: 2025-08-03

## 2025-08-03 RX ORDER — FAMOTIDINE 20 MG/1
20 TABLET, FILM COATED ORAL ONCE
Status: DISCONTINUED | OUTPATIENT
Start: 2025-08-03 | End: 2025-08-03

## 2025-08-03 RX ORDER — POTASSIUM CHLORIDE 7.45 MG/ML
10 INJECTION INTRAVENOUS ONCE
Status: COMPLETED | OUTPATIENT
Start: 2025-08-03 | End: 2025-08-03

## 2025-08-03 RX ORDER — 0.9 % SODIUM CHLORIDE 0.9 %
1000 INTRAVENOUS SOLUTION INTRAVENOUS ONCE
Status: COMPLETED | OUTPATIENT
Start: 2025-08-03 | End: 2025-08-03

## 2025-08-03 RX ORDER — IOPAMIDOL 755 MG/ML
75 INJECTION, SOLUTION INTRAVASCULAR
Status: DISCONTINUED | OUTPATIENT
Start: 2025-08-03 | End: 2025-08-03

## 2025-08-03 RX ORDER — DIPHENHYDRAMINE HYDROCHLORIDE 50 MG/ML
25 INJECTION, SOLUTION INTRAMUSCULAR; INTRAVENOUS ONCE
Status: COMPLETED | OUTPATIENT
Start: 2025-08-03 | End: 2025-08-03

## 2025-08-03 RX ORDER — FAMOTIDINE 10 MG/ML
INJECTION, SOLUTION INTRAVENOUS
Status: COMPLETED
Start: 2025-08-03 | End: 2025-08-03

## 2025-08-03 RX ORDER — IOPAMIDOL 755 MG/ML
18 INJECTION, SOLUTION INTRAVASCULAR
Status: DISCONTINUED | OUTPATIENT
Start: 2025-08-03 | End: 2025-08-03

## 2025-08-03 RX ORDER — ONDANSETRON 2 MG/ML
4 INJECTION INTRAMUSCULAR; INTRAVENOUS ONCE
Status: COMPLETED | OUTPATIENT
Start: 2025-08-03 | End: 2025-08-03

## 2025-08-03 RX ADMIN — DIPHENHYDRAMINE HYDROCHLORIDE 25 MG: 50 INJECTION INTRAMUSCULAR; INTRAVENOUS at 14:04

## 2025-08-03 RX ADMIN — POTASSIUM CHLORIDE 10 MEQ: 7.46 INJECTION, SOLUTION INTRAVENOUS at 16:34

## 2025-08-03 RX ADMIN — DROPERIDOL 1.25 MG: 2.5 INJECTION, SOLUTION INTRAMUSCULAR; INTRAVENOUS at 14:02

## 2025-08-03 RX ADMIN — DIPHENHYDRAMINE HYDROCHLORIDE 25 MG: 50 INJECTION, SOLUTION INTRAMUSCULAR; INTRAVENOUS at 14:04

## 2025-08-03 RX ADMIN — WATER 1000 MG: 1 INJECTION INTRAMUSCULAR; INTRAVENOUS; SUBCUTANEOUS at 15:55

## 2025-08-03 RX ADMIN — ONDANSETRON 4 MG: 2 INJECTION, SOLUTION INTRAMUSCULAR; INTRAVENOUS at 16:33

## 2025-08-03 RX ADMIN — MORPHINE SULFATE 2 MG: 2 INJECTION, SOLUTION INTRAMUSCULAR; INTRAVENOUS at 18:16

## 2025-08-03 RX ADMIN — MORPHINE SULFATE 2 MG: 2 INJECTION, SOLUTION INTRAMUSCULAR; INTRAVENOUS at 15:55

## 2025-08-03 RX ADMIN — SODIUM CHLORIDE 1000 ML: 0.9 INJECTION, SOLUTION INTRAVENOUS at 15:57

## 2025-08-03 RX ADMIN — FAMOTIDINE 20 MG: 10 INJECTION, SOLUTION INTRAVENOUS at 14:05

## 2025-08-04 ENCOUNTER — HOSPITAL ENCOUNTER (EMERGENCY)
Age: 30
Discharge: LEFT BEFORE DISCHARGE | End: 2025-08-04
Payer: COMMERCIAL

## 2025-08-04 VITALS
SYSTOLIC BLOOD PRESSURE: 111 MMHG | RESPIRATION RATE: 17 BRPM | HEART RATE: 96 BPM | BODY MASS INDEX: 21.73 KG/M2 | TEMPERATURE: 98.3 F | DIASTOLIC BLOOD PRESSURE: 53 MMHG | OXYGEN SATURATION: 100 % | WEIGHT: 115 LBS

## 2025-08-04 DIAGNOSIS — R10.9 ABDOMINAL PAIN, UNSPECIFIED ABDOMINAL LOCATION: Primary | ICD-10-CM

## 2025-08-04 DIAGNOSIS — Z53.21 ELOPED FROM EMERGENCY DEPARTMENT: ICD-10-CM

## 2025-08-04 DIAGNOSIS — E86.0 DEHYDRATION: ICD-10-CM

## 2025-08-04 DIAGNOSIS — F12.90 CANNABINOID HYPEREMESIS SYNDROME: ICD-10-CM

## 2025-08-04 DIAGNOSIS — R11.2 CANNABINOID HYPEREMESIS SYNDROME: ICD-10-CM

## 2025-08-04 LAB
ALBUMIN SERPL-MCNC: 3.5 G/DL (ref 3.5–5.2)
ALP SERPL-CCNC: 60 U/L (ref 35–104)
ALT SERPL-CCNC: 9 U/L (ref 0–35)
ANION GAP SERPL CALCULATED.3IONS-SCNC: 17 MMOL/L (ref 7–16)
AST SERPL-CCNC: 27 U/L (ref 0–35)
BACTERIA URNS QL MICRO: ABNORMAL
BASOPHILS # BLD: 0.06 K/UL (ref 0–0.2)
BASOPHILS NFR BLD: 1 % (ref 0–2)
BILIRUB SERPL-MCNC: 0.4 MG/DL (ref 0–1.2)
BILIRUB UR QL STRIP: NEGATIVE
BUN SERPL-MCNC: 8 MG/DL (ref 6–20)
CALCIUM SERPL-MCNC: 9 MG/DL (ref 8.6–10)
CHLORIDE SERPL-SCNC: 103 MMOL/L (ref 98–107)
CLARITY UR: CLEAR
CO2 SERPL-SCNC: 16 MMOL/L (ref 22–29)
COLOR UR: YELLOW
CREAT SERPL-MCNC: 1.1 MG/DL (ref 0.5–1)
EOSINOPHIL # BLD: 0.04 K/UL (ref 0.05–0.5)
EOSINOPHILS RELATIVE PERCENT: 0 % (ref 0–6)
EPI CELLS #/AREA URNS HPF: ABNORMAL /HPF
ERYTHROCYTE [DISTWIDTH] IN BLOOD BY AUTOMATED COUNT: 13.2 % (ref 11.5–15)
GFR, ESTIMATED: 74 ML/MIN/1.73M2
GLUCOSE SERPL-MCNC: 166 MG/DL (ref 74–99)
GLUCOSE UR STRIP-MCNC: NEGATIVE MG/DL
HCG, URINE, POC: NEGATIVE
HCT VFR BLD AUTO: 35.2 % (ref 34–48)
HGB BLD-MCNC: 12 G/DL (ref 11.5–15.5)
HGB UR QL STRIP.AUTO: ABNORMAL
IMM GRANULOCYTES # BLD AUTO: 0.04 K/UL (ref 0–0.58)
IMM GRANULOCYTES NFR BLD: 0 % (ref 0–5)
KETONES UR STRIP-MCNC: 15 MG/DL
LACTATE BLDV-SCNC: 1.2 MMOL/L (ref 0.5–2.2)
LEUKOCYTE ESTERASE UR QL STRIP: NEGATIVE
LIPASE SERPL-CCNC: 14 U/L (ref 13–60)
LYMPHOCYTES NFR BLD: 2.7 K/UL (ref 1.5–4)
LYMPHOCYTES RELATIVE PERCENT: 25 % (ref 20–42)
Lab: NORMAL
MCH RBC QN AUTO: 28 PG (ref 26–35)
MCHC RBC AUTO-ENTMCNC: 34.1 G/DL (ref 32–34.5)
MCV RBC AUTO: 82.2 FL (ref 80–99.9)
MONOCYTES NFR BLD: 0.44 K/UL (ref 0.1–0.95)
MONOCYTES NFR BLD: 4 % (ref 2–12)
NEGATIVE QC PASS/FAIL: NORMAL
NEUTROPHILS NFR BLD: 70 % (ref 43–80)
NEUTS SEG NFR BLD: 7.69 K/UL (ref 1.8–7.3)
NITRITE UR QL STRIP: NEGATIVE
PH UR STRIP: 7 [PH] (ref 5–8)
PLATELET # BLD AUTO: 346 K/UL (ref 130–450)
PMV BLD AUTO: 10.5 FL (ref 7–12)
POSITIVE QC PASS/FAIL: NORMAL
POTASSIUM SERPL-SCNC: 3.8 MMOL/L (ref 3.5–5.1)
PROT SERPL-MCNC: 7.3 G/DL (ref 6.4–8.3)
PROT UR STRIP-MCNC: >=300 MG/DL
RBC # BLD AUTO: 4.28 M/UL (ref 3.5–5.5)
RBC #/AREA URNS HPF: ABNORMAL /HPF
SODIUM SERPL-SCNC: 137 MMOL/L (ref 136–145)
SP GR UR STRIP: 1.02 (ref 1–1.03)
UROBILINOGEN UR STRIP-ACNC: 0.2 EU/DL (ref 0–1)
WBC #/AREA URNS HPF: ABNORMAL /HPF
WBC OTHER # BLD: 11 K/UL (ref 4.5–11.5)

## 2025-08-04 PROCEDURE — 2580000003 HC RX 258: Performed by: NURSE PRACTITIONER

## 2025-08-04 PROCEDURE — 87086 URINE CULTURE/COLONY COUNT: CPT

## 2025-08-04 PROCEDURE — 99284 EMERGENCY DEPT VISIT MOD MDM: CPT

## 2025-08-04 PROCEDURE — 85025 COMPLETE CBC W/AUTO DIFF WBC: CPT

## 2025-08-04 PROCEDURE — 93005 ELECTROCARDIOGRAM TRACING: CPT | Performed by: NURSE PRACTITIONER

## 2025-08-04 PROCEDURE — 6360000002 HC RX W HCPCS: Performed by: NURSE PRACTITIONER

## 2025-08-04 PROCEDURE — 80053 COMPREHEN METABOLIC PANEL: CPT

## 2025-08-04 PROCEDURE — 83690 ASSAY OF LIPASE: CPT

## 2025-08-04 PROCEDURE — 96361 HYDRATE IV INFUSION ADD-ON: CPT

## 2025-08-04 PROCEDURE — 96372 THER/PROPH/DIAG INJ SC/IM: CPT

## 2025-08-04 PROCEDURE — 81001 URINALYSIS AUTO W/SCOPE: CPT

## 2025-08-04 PROCEDURE — 83605 ASSAY OF LACTIC ACID: CPT

## 2025-08-04 PROCEDURE — 96375 TX/PRO/DX INJ NEW DRUG ADDON: CPT

## 2025-08-04 PROCEDURE — 96374 THER/PROPH/DIAG INJ IV PUSH: CPT

## 2025-08-04 RX ORDER — CAPSAICIN 0.07 G/100G
CREAM TOPICAL ONCE
Status: DISCONTINUED | OUTPATIENT
Start: 2025-08-04 | End: 2025-08-04 | Stop reason: HOSPADM

## 2025-08-04 RX ORDER — PROCHLORPERAZINE EDISYLATE 5 MG/ML
10 INJECTION INTRAMUSCULAR; INTRAVENOUS ONCE
Status: COMPLETED | OUTPATIENT
Start: 2025-08-04 | End: 2025-08-04

## 2025-08-04 RX ORDER — DIPHENHYDRAMINE HYDROCHLORIDE 50 MG/ML
25 INJECTION, SOLUTION INTRAMUSCULAR; INTRAVENOUS ONCE
Status: COMPLETED | OUTPATIENT
Start: 2025-08-04 | End: 2025-08-04

## 2025-08-04 RX ORDER — MORPHINE SULFATE 4 MG/ML
4 INJECTION, SOLUTION INTRAMUSCULAR; INTRAVENOUS ONCE
Status: COMPLETED | OUTPATIENT
Start: 2025-08-04 | End: 2025-08-04

## 2025-08-04 RX ORDER — 0.9 % SODIUM CHLORIDE 0.9 %
1000 INTRAVENOUS SOLUTION INTRAVENOUS ONCE
Status: DISCONTINUED | OUTPATIENT
Start: 2025-08-04 | End: 2025-08-04 | Stop reason: HOSPADM

## 2025-08-04 RX ORDER — 0.9 % SODIUM CHLORIDE 0.9 %
1000 INTRAVENOUS SOLUTION INTRAVENOUS ONCE
Status: COMPLETED | OUTPATIENT
Start: 2025-08-04 | End: 2025-08-04

## 2025-08-04 RX ADMIN — MORPHINE SULFATE 4 MG: 4 INJECTION, SOLUTION INTRAMUSCULAR; INTRAVENOUS at 18:15

## 2025-08-04 RX ADMIN — SODIUM CHLORIDE 1000 ML: 0.9 INJECTION, SOLUTION INTRAVENOUS at 18:14

## 2025-08-04 RX ADMIN — DIPHENHYDRAMINE HYDROCHLORIDE 25 MG: 50 INJECTION INTRAMUSCULAR; INTRAVENOUS at 18:15

## 2025-08-04 RX ADMIN — PROCHLORPERAZINE EDISYLATE 10 MG: 5 INJECTION INTRAMUSCULAR; INTRAVENOUS at 18:15

## 2025-08-04 ASSESSMENT — PAIN DESCRIPTION - DESCRIPTORS: DESCRIPTORS: SHARP

## 2025-08-04 ASSESSMENT — PAIN DESCRIPTION - ORIENTATION
ORIENTATION: RIGHT;LEFT;LOWER
ORIENTATION: MID

## 2025-08-04 ASSESSMENT — PAIN DESCRIPTION - PAIN TYPE: TYPE: ACUTE PAIN

## 2025-08-04 ASSESSMENT — PAIN DESCRIPTION - LOCATION
LOCATION: ABDOMEN
LOCATION: ABDOMEN

## 2025-08-04 ASSESSMENT — PAIN SCALES - GENERAL
PAINLEVEL_OUTOF10: 10
PAINLEVEL_OUTOF10: 10

## 2025-08-04 ASSESSMENT — PAIN DESCRIPTION - FREQUENCY: FREQUENCY: CONTINUOUS

## 2025-08-04 ASSESSMENT — LIFESTYLE VARIABLES: HOW MANY STANDARD DRINKS CONTAINING ALCOHOL DO YOU HAVE ON A TYPICAL DAY: PATIENT DOES NOT DRINK

## 2025-08-04 ASSESSMENT — PAIN DESCRIPTION - ONSET: ONSET: ON-GOING

## 2025-08-04 ASSESSMENT — PAIN - FUNCTIONAL ASSESSMENT
PAIN_FUNCTIONAL_ASSESSMENT: ACTIVITIES ARE NOT PREVENTED
PAIN_FUNCTIONAL_ASSESSMENT: 0-10

## 2025-08-05 ENCOUNTER — HOSPITAL ENCOUNTER (EMERGENCY)
Age: 30
Discharge: HOME | End: 2025-08-06
Attending: EMERGENCY MEDICINE
Payer: COMMERCIAL

## 2025-08-05 ENCOUNTER — APPOINTMENT (OUTPATIENT)
Dept: ULTRASOUND IMAGING | Age: 30
End: 2025-08-05
Payer: COMMERCIAL

## 2025-08-05 ENCOUNTER — APPOINTMENT (OUTPATIENT)
Dept: CT IMAGING | Age: 30
End: 2025-08-05
Payer: COMMERCIAL

## 2025-08-05 DIAGNOSIS — R10.84 GENERALIZED ABDOMINAL PAIN: Primary | ICD-10-CM

## 2025-08-05 LAB
ALBUMIN SERPL-MCNC: 3.7 G/DL (ref 3.5–5.2)
ALP SERPL-CCNC: 59 U/L (ref 35–104)
ALT SERPL-CCNC: 8 U/L (ref 0–35)
ANION GAP SERPL CALCULATED.3IONS-SCNC: 18 MMOL/L (ref 7–16)
AST SERPL-CCNC: 19 U/L (ref 0–35)
BACTERIA URNS QL MICRO: ABNORMAL
BASOPHILS # BLD: 0.03 K/UL (ref 0–0.2)
BASOPHILS NFR BLD: 0 % (ref 0–2)
BILIRUB SERPL-MCNC: 0.4 MG/DL (ref 0–1.2)
BILIRUB UR QL STRIP: NEGATIVE
BUN SERPL-MCNC: 7 MG/DL (ref 6–20)
CALCIUM SERPL-MCNC: 9.1 MG/DL (ref 8.6–10)
CHLORIDE SERPL-SCNC: 103 MMOL/L (ref 98–107)
CLARITY UR: CLEAR
CO2 SERPL-SCNC: 19 MMOL/L (ref 22–29)
COLOR UR: YELLOW
CREAT SERPL-MCNC: 0.9 MG/DL (ref 0.5–1)
EKG ATRIAL RATE: 104 BPM
EKG P AXIS: 66 DEGREES
EKG P-R INTERVAL: 142 MS
EKG Q-T INTERVAL: 328 MS
EKG QRS DURATION: 64 MS
EKG QTC CALCULATION (BAZETT): 431 MS
EKG R AXIS: 53 DEGREES
EKG T AXIS: 37 DEGREES
EKG VENTRICULAR RATE: 104 BPM
EOSINOPHIL # BLD: 0.02 K/UL (ref 0.05–0.5)
EOSINOPHILS RELATIVE PERCENT: 0 % (ref 0–6)
EPI CELLS #/AREA URNS HPF: ABNORMAL /HPF
ERYTHROCYTE [DISTWIDTH] IN BLOOD BY AUTOMATED COUNT: 13 % (ref 11.5–15)
GFR, ESTIMATED: 88 ML/MIN/1.73M2
GLUCOSE SERPL-MCNC: 139 MG/DL (ref 74–99)
GLUCOSE UR STRIP-MCNC: NEGATIVE MG/DL
HCG, URINE, POC: NEGATIVE
HCT VFR BLD AUTO: 35.6 % (ref 34–48)
HGB BLD-MCNC: 12.4 G/DL (ref 11.5–15.5)
HGB UR QL STRIP.AUTO: ABNORMAL
IMM GRANULOCYTES # BLD AUTO: 0.03 K/UL (ref 0–0.58)
IMM GRANULOCYTES NFR BLD: 0 % (ref 0–5)
KETONES UR STRIP-MCNC: 15 MG/DL
LEUKOCYTE ESTERASE UR QL STRIP: NEGATIVE
LIPASE SERPL-CCNC: 12 U/L (ref 13–60)
LYMPHOCYTES NFR BLD: 1.94 K/UL (ref 1.5–4)
LYMPHOCYTES RELATIVE PERCENT: 23 % (ref 20–42)
Lab: NORMAL
MCH RBC QN AUTO: 28.2 PG (ref 26–35)
MCHC RBC AUTO-ENTMCNC: 34.8 G/DL (ref 32–34.5)
MCV RBC AUTO: 80.9 FL (ref 80–99.9)
MICROORGANISM SPEC CULT: NO GROWTH
MONOCYTES NFR BLD: 0.31 K/UL (ref 0.1–0.95)
MONOCYTES NFR BLD: 4 % (ref 2–12)
NEGATIVE QC PASS/FAIL: NORMAL
NEUTROPHILS NFR BLD: 72 % (ref 43–80)
NEUTS SEG NFR BLD: 6.11 K/UL (ref 1.8–7.3)
NITRITE UR QL STRIP: NEGATIVE
PH UR STRIP: 7 [PH] (ref 5–8)
PLATELET # BLD AUTO: 310 K/UL (ref 130–450)
PMV BLD AUTO: 10.1 FL (ref 7–12)
POSITIVE QC PASS/FAIL: NORMAL
POTASSIUM SERPL-SCNC: 3.1 MMOL/L (ref 3.5–5.1)
PROT SERPL-MCNC: 7.4 G/DL (ref 6.4–8.3)
PROT UR STRIP-MCNC: >=300 MG/DL
RBC # BLD AUTO: 4.4 M/UL (ref 3.5–5.5)
RBC #/AREA URNS HPF: ABNORMAL /HPF
SERVICE CMNT-IMP: NORMAL
SODIUM SERPL-SCNC: 140 MMOL/L (ref 136–145)
SP GR UR STRIP: 1.02 (ref 1–1.03)
SPECIMEN DESCRIPTION: NORMAL
UROBILINOGEN UR STRIP-ACNC: 0.2 EU/DL (ref 0–1)
WBC #/AREA URNS HPF: ABNORMAL /HPF
WBC OTHER # BLD: 8.4 K/UL (ref 4.5–11.5)

## 2025-08-05 PROCEDURE — 93010 ELECTROCARDIOGRAM REPORT: CPT | Performed by: INTERNAL MEDICINE

## 2025-08-05 PROCEDURE — 93975 VASCULAR STUDY: CPT

## 2025-08-05 PROCEDURE — 83690 ASSAY OF LIPASE: CPT

## 2025-08-05 PROCEDURE — 6360000002 HC RX W HCPCS: Performed by: EMERGENCY MEDICINE

## 2025-08-05 PROCEDURE — 80053 COMPREHEN METABOLIC PANEL: CPT

## 2025-08-05 PROCEDURE — 2580000003 HC RX 258: Performed by: EMERGENCY MEDICINE

## 2025-08-05 PROCEDURE — 81001 URINALYSIS AUTO W/SCOPE: CPT

## 2025-08-05 PROCEDURE — 85025 COMPLETE CBC W/AUTO DIFF WBC: CPT

## 2025-08-05 PROCEDURE — 6370000000 HC RX 637 (ALT 250 FOR IP): Performed by: EMERGENCY MEDICINE

## 2025-08-05 PROCEDURE — 96374 THER/PROPH/DIAG INJ IV PUSH: CPT

## 2025-08-05 PROCEDURE — 93005 ELECTROCARDIOGRAM TRACING: CPT | Performed by: EMERGENCY MEDICINE

## 2025-08-05 PROCEDURE — 76830 TRANSVAGINAL US NON-OB: CPT

## 2025-08-05 PROCEDURE — 74177 CT ABD & PELVIS W/CONTRAST: CPT

## 2025-08-05 PROCEDURE — 96376 TX/PRO/DX INJ SAME DRUG ADON: CPT

## 2025-08-05 PROCEDURE — 99285 EMERGENCY DEPT VISIT HI MDM: CPT

## 2025-08-05 PROCEDURE — 6360000004 HC RX CONTRAST MEDICATION: Performed by: RADIOLOGY

## 2025-08-05 PROCEDURE — 96375 TX/PRO/DX INJ NEW DRUG ADDON: CPT

## 2025-08-05 RX ORDER — DIPHENHYDRAMINE HYDROCHLORIDE 50 MG/ML
25 INJECTION, SOLUTION INTRAMUSCULAR; INTRAVENOUS ONCE
Status: COMPLETED | OUTPATIENT
Start: 2025-08-05 | End: 2025-08-05

## 2025-08-05 RX ORDER — CEFDINIR 300 MG/1
300 CAPSULE ORAL 2 TIMES DAILY
Qty: 20 CAPSULE | Refills: 0 | Status: ON HOLD | OUTPATIENT
Start: 2025-08-05 | End: 2025-08-09 | Stop reason: HOSPADM

## 2025-08-05 RX ORDER — MORPHINE SULFATE 4 MG/ML
4 INJECTION, SOLUTION INTRAMUSCULAR; INTRAVENOUS ONCE
Refills: 0 | Status: COMPLETED | OUTPATIENT
Start: 2025-08-05 | End: 2025-08-05

## 2025-08-05 RX ORDER — DROPERIDOL 2.5 MG/ML
1.25 INJECTION, SOLUTION INTRAMUSCULAR; INTRAVENOUS ONCE
Status: DISCONTINUED | OUTPATIENT
Start: 2025-08-05 | End: 2025-08-05

## 2025-08-05 RX ORDER — 0.9 % SODIUM CHLORIDE 0.9 %
1000 INTRAVENOUS SOLUTION INTRAVENOUS ONCE
Status: COMPLETED | OUTPATIENT
Start: 2025-08-05 | End: 2025-08-06

## 2025-08-05 RX ORDER — POTASSIUM CHLORIDE 1500 MG/1
40 TABLET, EXTENDED RELEASE ORAL ONCE
Status: COMPLETED | OUTPATIENT
Start: 2025-08-05 | End: 2025-08-05

## 2025-08-05 RX ORDER — METOCLOPRAMIDE HYDROCHLORIDE 5 MG/ML
10 INJECTION INTRAMUSCULAR; INTRAVENOUS ONCE
Status: COMPLETED | OUTPATIENT
Start: 2025-08-05 | End: 2025-08-05

## 2025-08-05 RX ORDER — IOPAMIDOL 755 MG/ML
75 INJECTION, SOLUTION INTRAVASCULAR
Status: COMPLETED | OUTPATIENT
Start: 2025-08-05 | End: 2025-08-05

## 2025-08-05 RX ADMIN — DIPHENHYDRAMINE HYDROCHLORIDE 25 MG: 50 INJECTION INTRAMUSCULAR; INTRAVENOUS at 20:41

## 2025-08-05 RX ADMIN — MORPHINE SULFATE 4 MG: 4 INJECTION, SOLUTION INTRAMUSCULAR; INTRAVENOUS at 22:06

## 2025-08-05 RX ADMIN — METOCLOPRAMIDE 10 MG: 5 INJECTION, SOLUTION INTRAMUSCULAR; INTRAVENOUS at 20:40

## 2025-08-05 RX ADMIN — IOPAMIDOL 75 ML: 755 INJECTION, SOLUTION INTRAVENOUS at 22:22

## 2025-08-05 RX ADMIN — POTASSIUM CHLORIDE 40 MEQ: 1500 TABLET, EXTENDED RELEASE ORAL at 21:35

## 2025-08-05 RX ADMIN — MORPHINE SULFATE 4 MG: 4 INJECTION, SOLUTION INTRAMUSCULAR; INTRAVENOUS at 20:41

## 2025-08-05 RX ADMIN — SODIUM CHLORIDE 1000 ML: 0.9 INJECTION, SOLUTION INTRAVENOUS at 20:43

## 2025-08-05 ASSESSMENT — PAIN DESCRIPTION - DESCRIPTORS
DESCRIPTORS: ACHING;DISCOMFORT
DESCRIPTORS: ACHING;DISCOMFORT

## 2025-08-05 ASSESSMENT — PAIN DESCRIPTION - LOCATION
LOCATION: ABDOMEN

## 2025-08-05 ASSESSMENT — PAIN SCALES - GENERAL
PAINLEVEL_OUTOF10: 10

## 2025-08-05 ASSESSMENT — PAIN - FUNCTIONAL ASSESSMENT
PAIN_FUNCTIONAL_ASSESSMENT: ACTIVITIES ARE NOT PREVENTED
PAIN_FUNCTIONAL_ASSESSMENT: ACTIVITIES ARE NOT PREVENTED
PAIN_FUNCTIONAL_ASSESSMENT: 0-10

## 2025-08-06 VITALS
RESPIRATION RATE: 18 BRPM | TEMPERATURE: 98.8 F | BODY MASS INDEX: 21.71 KG/M2 | DIASTOLIC BLOOD PRESSURE: 103 MMHG | OXYGEN SATURATION: 100 % | SYSTOLIC BLOOD PRESSURE: 167 MMHG | HEART RATE: 90 BPM | WEIGHT: 115 LBS | HEIGHT: 61 IN

## 2025-08-06 PROCEDURE — 6360000002 HC RX W HCPCS: Performed by: EMERGENCY MEDICINE

## 2025-08-06 PROCEDURE — 96376 TX/PRO/DX INJ SAME DRUG ADON: CPT

## 2025-08-06 RX ORDER — ONDANSETRON 4 MG/1
4 TABLET, ORALLY DISINTEGRATING ORAL 3 TIMES DAILY PRN
Qty: 21 TABLET | Refills: 0 | Status: SHIPPED | OUTPATIENT
Start: 2025-08-06

## 2025-08-06 RX ORDER — MORPHINE SULFATE 4 MG/ML
4 INJECTION, SOLUTION INTRAMUSCULAR; INTRAVENOUS ONCE
Refills: 0 | Status: COMPLETED | OUTPATIENT
Start: 2025-08-06 | End: 2025-08-06

## 2025-08-06 RX ADMIN — MORPHINE SULFATE 4 MG: 4 INJECTION, SOLUTION INTRAMUSCULAR; INTRAVENOUS at 01:16

## 2025-08-06 ASSESSMENT — PAIN DESCRIPTION - LOCATION: LOCATION: ABDOMEN

## 2025-08-06 ASSESSMENT — PAIN - FUNCTIONAL ASSESSMENT: PAIN_FUNCTIONAL_ASSESSMENT: ACTIVITIES ARE NOT PREVENTED

## 2025-08-06 ASSESSMENT — PAIN DESCRIPTION - DESCRIPTORS: DESCRIPTORS: ACHING;DISCOMFORT

## 2025-08-06 ASSESSMENT — PAIN SCALES - GENERAL: PAINLEVEL_OUTOF10: 10

## 2025-08-07 ENCOUNTER — HOSPITAL ENCOUNTER (OUTPATIENT)
Age: 30
Setting detail: OBSERVATION
Discharge: HOME | End: 2025-08-09
Attending: EMERGENCY MEDICINE | Admitting: INTERNAL MEDICINE
Payer: COMMERCIAL

## 2025-08-07 DIAGNOSIS — E87.0 HYPERNATREMIA: Primary | ICD-10-CM

## 2025-08-07 DIAGNOSIS — R10.9 INTRACTABLE ABDOMINAL PAIN: ICD-10-CM

## 2025-08-07 LAB
ALBUMIN SERPL-MCNC: 3.5 G/DL (ref 3.5–5.2)
ALP SERPL-CCNC: 51 U/L (ref 35–104)
ALT SERPL-CCNC: 11 U/L (ref 0–35)
ANION GAP SERPL CALCULATED.3IONS-SCNC: 16 MMOL/L (ref 7–16)
ANION GAP SERPL CALCULATED.3IONS-SCNC: 19 MMOL/L (ref 7–16)
AST SERPL-CCNC: 23 U/L (ref 0–35)
B-OH-BUTYR SERPL-MCNC: 0.83 MMOL/L (ref 0.02–0.27)
BASOPHILS # BLD: 0.05 K/UL (ref 0–0.2)
BASOPHILS NFR BLD: 1 % (ref 0–2)
BILIRUB SERPL-MCNC: <0.2 MG/DL (ref 0–1.2)
BUN SERPL-MCNC: 10 MG/DL (ref 6–20)
BUN SERPL-MCNC: 8 MG/DL (ref 6–20)
CALCIUM SERPL-MCNC: 8.5 MG/DL (ref 8.6–10)
CALCIUM SERPL-MCNC: 9.3 MG/DL (ref 8.6–10)
CHLORIDE SERPL-SCNC: 102 MMOL/L (ref 98–107)
CHLORIDE SERPL-SCNC: 108 MMOL/L (ref 98–107)
CO2 SERPL-SCNC: 18 MMOL/L (ref 22–29)
CO2 SERPL-SCNC: 23 MMOL/L (ref 22–29)
CREAT SERPL-MCNC: 0.8 MG/DL (ref 0.5–1)
CREAT SERPL-MCNC: 1.1 MG/DL (ref 0.5–1)
EKG ATRIAL RATE: 116 BPM
EKG ATRIAL RATE: 97 BPM
EKG P AXIS: 71 DEGREES
EKG P AXIS: 72 DEGREES
EKG P-R INTERVAL: 130 MS
EKG P-R INTERVAL: 136 MS
EKG Q-T INTERVAL: 326 MS
EKG Q-T INTERVAL: 360 MS
EKG QRS DURATION: 62 MS
EKG QRS DURATION: 64 MS
EKG QTC CALCULATION (BAZETT): 453 MS
EKG QTC CALCULATION (BAZETT): 457 MS
EKG R AXIS: 58 DEGREES
EKG R AXIS: 63 DEGREES
EKG T AXIS: 51 DEGREES
EKG T AXIS: 65 DEGREES
EKG VENTRICULAR RATE: 116 BPM
EKG VENTRICULAR RATE: 97 BPM
EOSINOPHIL # BLD: 0.09 K/UL (ref 0.05–0.5)
EOSINOPHILS RELATIVE PERCENT: 1 % (ref 0–6)
ERYTHROCYTE [DISTWIDTH] IN BLOOD BY AUTOMATED COUNT: 13.2 % (ref 11.5–15)
GFR, ESTIMATED: 72 ML/MIN/1.73M2
GFR, ESTIMATED: >90 ML/MIN/1.73M2
GLUCOSE SERPL-MCNC: 128 MG/DL (ref 74–99)
GLUCOSE SERPL-MCNC: 167 MG/DL (ref 74–99)
HCT VFR BLD AUTO: 30.6 % (ref 34–48)
HGB BLD-MCNC: 10.3 G/DL (ref 11.5–15.5)
IMM GRANULOCYTES # BLD AUTO: <0.03 K/UL (ref 0–0.58)
IMM GRANULOCYTES NFR BLD: 0 % (ref 0–5)
LACTATE BLDV-SCNC: 1.7 MMOL/L (ref 0.5–2.2)
LIPASE SERPL-CCNC: 17 U/L (ref 13–60)
LYMPHOCYTES NFR BLD: 2.54 K/UL (ref 1.5–4)
LYMPHOCYTES RELATIVE PERCENT: 23 % (ref 20–42)
MCH RBC QN AUTO: 27.9 PG (ref 26–35)
MCHC RBC AUTO-ENTMCNC: 33.7 G/DL (ref 32–34.5)
MCV RBC AUTO: 82.9 FL (ref 80–99.9)
MONOCYTES NFR BLD: 0.49 K/UL (ref 0.1–0.95)
MONOCYTES NFR BLD: 5 % (ref 2–12)
NEUTROPHILS NFR BLD: 71 % (ref 43–80)
NEUTS SEG NFR BLD: 7.74 K/UL (ref 1.8–7.3)
OSMOLALITY SERPL: 298 MOSM/KG (ref 285–310)
OSMOLALITY UR: 311 MOSM/KG (ref 300–900)
PH VENOUS: 7.52 (ref 7.35–7.45)
PLATELET # BLD AUTO: 296 K/UL (ref 130–450)
PMV BLD AUTO: 10.7 FL (ref 7–12)
POTASSIUM SERPL-SCNC: 3.1 MMOL/L (ref 3.5–5.1)
POTASSIUM SERPL-SCNC: 3.2 MMOL/L (ref 3.5–5.1)
PROT SERPL-MCNC: 6.9 G/DL (ref 6.4–8.3)
RBC # BLD AUTO: 3.69 M/UL (ref 3.5–5.5)
SODIUM SERPL-SCNC: 136 MMOL/L (ref 136–145)
SODIUM SERPL-SCNC: 150 MMOL/L (ref 136–145)
SODIUM UR-SCNC: 132 MMOL/L
WBC OTHER # BLD: 10.9 K/UL (ref 4.5–11.5)

## 2025-08-07 PROCEDURE — 96361 HYDRATE IV INFUSION ADD-ON: CPT

## 2025-08-07 PROCEDURE — 96365 THER/PROPH/DIAG IV INF INIT: CPT

## 2025-08-07 PROCEDURE — 83935 ASSAY OF URINE OSMOLALITY: CPT

## 2025-08-07 PROCEDURE — 84300 ASSAY OF URINE SODIUM: CPT

## 2025-08-07 PROCEDURE — 99285 EMERGENCY DEPT VISIT HI MDM: CPT

## 2025-08-07 PROCEDURE — 83930 ASSAY OF BLOOD OSMOLALITY: CPT

## 2025-08-07 PROCEDURE — 85025 COMPLETE CBC W/AUTO DIFF WBC: CPT

## 2025-08-07 PROCEDURE — 82800 BLOOD PH: CPT

## 2025-08-07 PROCEDURE — 96376 TX/PRO/DX INJ SAME DRUG ADON: CPT

## 2025-08-07 PROCEDURE — G0378 HOSPITAL OBSERVATION PER HR: HCPCS

## 2025-08-07 PROCEDURE — 83690 ASSAY OF LIPASE: CPT

## 2025-08-07 PROCEDURE — 82010 KETONE BODYS QUAN: CPT

## 2025-08-07 PROCEDURE — 6360000002 HC RX W HCPCS: Performed by: EMERGENCY MEDICINE

## 2025-08-07 PROCEDURE — 83605 ASSAY OF LACTIC ACID: CPT

## 2025-08-07 PROCEDURE — 96366 THER/PROPH/DIAG IV INF ADDON: CPT

## 2025-08-07 PROCEDURE — 96374 THER/PROPH/DIAG INJ IV PUSH: CPT

## 2025-08-07 PROCEDURE — 80048 BASIC METABOLIC PNL TOTAL CA: CPT

## 2025-08-07 PROCEDURE — 6360000002 HC RX W HCPCS: Performed by: INTERNAL MEDICINE

## 2025-08-07 PROCEDURE — 80053 COMPREHEN METABOLIC PANEL: CPT

## 2025-08-07 PROCEDURE — 2500000003 HC RX 250 WO HCPCS: Performed by: INTERNAL MEDICINE

## 2025-08-07 PROCEDURE — 93010 ELECTROCARDIOGRAM REPORT: CPT | Performed by: INTERNAL MEDICINE

## 2025-08-07 PROCEDURE — 2580000003 HC RX 258: Performed by: EMERGENCY MEDICINE

## 2025-08-07 PROCEDURE — 96372 THER/PROPH/DIAG INJ SC/IM: CPT

## 2025-08-07 PROCEDURE — 99223 1ST HOSP IP/OBS HIGH 75: CPT | Performed by: INTERNAL MEDICINE

## 2025-08-07 PROCEDURE — 6370000000 HC RX 637 (ALT 250 FOR IP): Performed by: INTERNAL MEDICINE

## 2025-08-07 PROCEDURE — 96375 TX/PRO/DX INJ NEW DRUG ADDON: CPT

## 2025-08-07 RX ORDER — METOCLOPRAMIDE HYDROCHLORIDE 5 MG/ML
10 INJECTION INTRAMUSCULAR; INTRAVENOUS ONCE
Status: COMPLETED | OUTPATIENT
Start: 2025-08-07 | End: 2025-08-07

## 2025-08-07 RX ORDER — DIPHENHYDRAMINE HYDROCHLORIDE 50 MG/ML
25 INJECTION, SOLUTION INTRAMUSCULAR; INTRAVENOUS ONCE
Status: COMPLETED | OUTPATIENT
Start: 2025-08-07 | End: 2025-08-07

## 2025-08-07 RX ORDER — POTASSIUM CHLORIDE 7.45 MG/ML
10 INJECTION INTRAVENOUS PRN
Status: DISCONTINUED | OUTPATIENT
Start: 2025-08-07 | End: 2025-08-08

## 2025-08-07 RX ORDER — ENOXAPARIN SODIUM 100 MG/ML
40 INJECTION SUBCUTANEOUS DAILY
Status: DISCONTINUED | OUTPATIENT
Start: 2025-08-07 | End: 2025-08-09 | Stop reason: HOSPADM

## 2025-08-07 RX ORDER — AMLODIPINE BESYLATE 10 MG/1
10 TABLET ORAL DAILY
Status: DISCONTINUED | OUTPATIENT
Start: 2025-08-07 | End: 2025-08-09 | Stop reason: HOSPADM

## 2025-08-07 RX ORDER — ACETAMINOPHEN 650 MG/1
650 SUPPOSITORY RECTAL EVERY 6 HOURS PRN
Status: DISCONTINUED | OUTPATIENT
Start: 2025-08-07 | End: 2025-08-09 | Stop reason: HOSPADM

## 2025-08-07 RX ORDER — ACETAMINOPHEN 325 MG/1
650 TABLET ORAL EVERY 6 HOURS PRN
Status: DISCONTINUED | OUTPATIENT
Start: 2025-08-07 | End: 2025-08-09 | Stop reason: HOSPADM

## 2025-08-07 RX ORDER — METOPROLOL TARTRATE 50 MG
50 TABLET ORAL 2 TIMES DAILY
Status: DISCONTINUED | OUTPATIENT
Start: 2025-08-07 | End: 2025-08-09 | Stop reason: HOSPADM

## 2025-08-07 RX ORDER — MORPHINE SULFATE 4 MG/ML
4 INJECTION, SOLUTION INTRAMUSCULAR; INTRAVENOUS EVERY 4 HOURS PRN
Status: DISCONTINUED | OUTPATIENT
Start: 2025-08-07 | End: 2025-08-07

## 2025-08-07 RX ORDER — POTASSIUM CHLORIDE 1500 MG/1
40 TABLET, EXTENDED RELEASE ORAL PRN
Status: DISCONTINUED | OUTPATIENT
Start: 2025-08-07 | End: 2025-08-08

## 2025-08-07 RX ORDER — ZOLPIDEM TARTRATE 5 MG/1
10 TABLET ORAL NIGHTLY
Status: DISCONTINUED | OUTPATIENT
Start: 2025-08-07 | End: 2025-08-09 | Stop reason: HOSPADM

## 2025-08-07 RX ORDER — ONDANSETRON 2 MG/ML
4 INJECTION INTRAMUSCULAR; INTRAVENOUS EVERY 6 HOURS PRN
Status: DISCONTINUED | OUTPATIENT
Start: 2025-08-07 | End: 2025-08-09 | Stop reason: HOSPADM

## 2025-08-07 RX ORDER — 0.9 % SODIUM CHLORIDE 0.9 %
1000 INTRAVENOUS SOLUTION INTRAVENOUS ONCE
Status: COMPLETED | OUTPATIENT
Start: 2025-08-07 | End: 2025-08-07

## 2025-08-07 RX ORDER — POLYETHYLENE GLYCOL 3350 17 G/17G
17 POWDER, FOR SOLUTION ORAL DAILY PRN
Status: DISCONTINUED | OUTPATIENT
Start: 2025-08-07 | End: 2025-08-08

## 2025-08-07 RX ORDER — DEXTROSE MONOHYDRATE, SODIUM CHLORIDE, AND POTASSIUM CHLORIDE 50; 1.49; 4.5 G/1000ML; G/1000ML; G/1000ML
INJECTION, SOLUTION INTRAVENOUS CONTINUOUS
Status: DISPENSED | OUTPATIENT
Start: 2025-08-07 | End: 2025-08-08

## 2025-08-07 RX ORDER — SODIUM CHLORIDE 0.9 % (FLUSH) 0.9 %
5-40 SYRINGE (ML) INJECTION PRN
Status: DISCONTINUED | OUTPATIENT
Start: 2025-08-07 | End: 2025-08-09 | Stop reason: HOSPADM

## 2025-08-07 RX ORDER — ONDANSETRON 4 MG/1
4 TABLET, ORALLY DISINTEGRATING ORAL EVERY 8 HOURS PRN
Status: DISCONTINUED | OUTPATIENT
Start: 2025-08-07 | End: 2025-08-09 | Stop reason: HOSPADM

## 2025-08-07 RX ORDER — DICYCLOMINE HYDROCHLORIDE 10 MG/1
10 CAPSULE ORAL
Status: DISCONTINUED | OUTPATIENT
Start: 2025-08-07 | End: 2025-08-09 | Stop reason: HOSPADM

## 2025-08-07 RX ORDER — SODIUM CHLORIDE 0.9 % (FLUSH) 0.9 %
5-40 SYRINGE (ML) INJECTION EVERY 12 HOURS SCHEDULED
Status: DISCONTINUED | OUTPATIENT
Start: 2025-08-07 | End: 2025-08-09 | Stop reason: HOSPADM

## 2025-08-07 RX ORDER — MORPHINE SULFATE 4 MG/ML
4 INJECTION, SOLUTION INTRAMUSCULAR; INTRAVENOUS EVERY 8 HOURS PRN
Status: DISCONTINUED | OUTPATIENT
Start: 2025-08-07 | End: 2025-08-08

## 2025-08-07 RX ORDER — SODIUM CHLORIDE 9 MG/ML
INJECTION, SOLUTION INTRAVENOUS PRN
Status: DISCONTINUED | OUTPATIENT
Start: 2025-08-07 | End: 2025-08-09 | Stop reason: HOSPADM

## 2025-08-07 RX ORDER — PROMETHAZINE HYDROCHLORIDE 25 MG/1
25 TABLET ORAL EVERY 6 HOURS PRN
Status: DISCONTINUED | OUTPATIENT
Start: 2025-08-07 | End: 2025-08-09 | Stop reason: HOSPADM

## 2025-08-07 RX ORDER — MAGNESIUM SULFATE IN WATER 40 MG/ML
2000 INJECTION, SOLUTION INTRAVENOUS PRN
Status: DISCONTINUED | OUTPATIENT
Start: 2025-08-07 | End: 2025-08-08

## 2025-08-07 RX ORDER — MORPHINE SULFATE 4 MG/ML
4 INJECTION, SOLUTION INTRAMUSCULAR; INTRAVENOUS ONCE
Status: COMPLETED | OUTPATIENT
Start: 2025-08-07 | End: 2025-08-07

## 2025-08-07 RX ORDER — OXYCODONE AND ACETAMINOPHEN 5; 325 MG/1; MG/1
1 TABLET ORAL EVERY 6 HOURS PRN
Refills: 0 | Status: DISCONTINUED | OUTPATIENT
Start: 2025-08-07 | End: 2025-08-09 | Stop reason: HOSPADM

## 2025-08-07 RX ORDER — PANTOPRAZOLE SODIUM 40 MG/1
40 TABLET, DELAYED RELEASE ORAL DAILY
Status: DISCONTINUED | OUTPATIENT
Start: 2025-08-07 | End: 2025-08-09 | Stop reason: HOSPADM

## 2025-08-07 RX ADMIN — DEXTROSE, SODIUM CHLORIDE, AND POTASSIUM CHLORIDE: 5; .45; .15 INJECTION INTRAVENOUS at 22:57

## 2025-08-07 RX ADMIN — PANTOPRAZOLE SODIUM 40 MG: 40 TABLET, DELAYED RELEASE ORAL at 17:53

## 2025-08-07 RX ADMIN — MORPHINE SULFATE 4 MG: 4 INJECTION, SOLUTION INTRAMUSCULAR; INTRAVENOUS at 11:40

## 2025-08-07 RX ADMIN — SODIUM CHLORIDE 1000 ML: 0.9 INJECTION, SOLUTION INTRAVENOUS at 14:18

## 2025-08-07 RX ADMIN — OXYCODONE AND ACETAMINOPHEN 1 TABLET: 5; 325 TABLET ORAL at 16:26

## 2025-08-07 RX ADMIN — DIPHENHYDRAMINE HYDROCHLORIDE 25 MG: 50 INJECTION INTRAMUSCULAR; INTRAVENOUS at 10:42

## 2025-08-07 RX ADMIN — MORPHINE SULFATE 4 MG: 4 INJECTION, SOLUTION INTRAMUSCULAR; INTRAVENOUS at 14:10

## 2025-08-07 RX ADMIN — DICYCLOMINE HYDROCHLORIDE 10 MG: 10 CAPSULE ORAL at 17:53

## 2025-08-07 RX ADMIN — ENOXAPARIN SODIUM 40 MG: 100 INJECTION SUBCUTANEOUS at 17:54

## 2025-08-07 RX ADMIN — ZOLPIDEM TARTRATE 10 MG: 5 TABLET ORAL at 22:56

## 2025-08-07 RX ADMIN — MORPHINE SULFATE 4 MG: 4 INJECTION, SOLUTION INTRAMUSCULAR; INTRAVENOUS at 10:42

## 2025-08-07 RX ADMIN — MORPHINE SULFATE 4 MG: 4 INJECTION, SOLUTION INTRAMUSCULAR; INTRAVENOUS at 21:12

## 2025-08-07 RX ADMIN — SODIUM CHLORIDE 1000 ML: 0.9 INJECTION, SOLUTION INTRAVENOUS at 10:41

## 2025-08-07 RX ADMIN — OXYCODONE AND ACETAMINOPHEN 1 TABLET: 5; 325 TABLET ORAL at 23:56

## 2025-08-07 RX ADMIN — METOCLOPRAMIDE 10 MG: 5 INJECTION, SOLUTION INTRAMUSCULAR; INTRAVENOUS at 10:42

## 2025-08-07 RX ADMIN — DEXTROSE, SODIUM CHLORIDE, AND POTASSIUM CHLORIDE 1000 ML: 5; .45; .15 INJECTION INTRAVENOUS at 14:15

## 2025-08-07 RX ADMIN — DICYCLOMINE HYDROCHLORIDE 10 MG: 10 CAPSULE ORAL at 21:11

## 2025-08-07 RX ADMIN — METOPROLOL TARTRATE 50 MG: 50 TABLET, FILM COATED ORAL at 21:11

## 2025-08-07 ASSESSMENT — PAIN DESCRIPTION - LOCATION
LOCATION: ABDOMEN

## 2025-08-07 ASSESSMENT — PAIN SCALES - GENERAL
PAINLEVEL_OUTOF10: 8
PAINLEVEL_OUTOF10: 10
PAINLEVEL_OUTOF10: 7
PAINLEVEL_OUTOF10: 9
PAINLEVEL_OUTOF10: 10
PAINLEVEL_OUTOF10: 9
PAINLEVEL_OUTOF10: 10
PAINLEVEL_OUTOF10: 7
PAINLEVEL_OUTOF10: 10

## 2025-08-07 ASSESSMENT — PAIN DESCRIPTION - ORIENTATION
ORIENTATION: RIGHT
ORIENTATION: RIGHT;UPPER
ORIENTATION: RIGHT

## 2025-08-07 ASSESSMENT — PAIN DESCRIPTION - DESCRIPTORS
DESCRIPTORS: STABBING
DESCRIPTORS: SHARP
DESCRIPTORS: ACHING;DISCOMFORT;SPASM;SHARP
DESCRIPTORS: STABBING
DESCRIPTORS: ACHING;DISCOMFORT;THROBBING
DESCRIPTORS: ACHING;DISCOMFORT
DESCRIPTORS: BURNING
DESCRIPTORS: ACHING;DISCOMFORT

## 2025-08-07 ASSESSMENT — PAIN - FUNCTIONAL ASSESSMENT
PAIN_FUNCTIONAL_ASSESSMENT: 0-10
PAIN_FUNCTIONAL_ASSESSMENT: ACTIVITIES ARE NOT PREVENTED
PAIN_FUNCTIONAL_ASSESSMENT: PREVENTS OR INTERFERES SOME ACTIVE ACTIVITIES AND ADLS
PAIN_FUNCTIONAL_ASSESSMENT: ACTIVITIES ARE NOT PREVENTED

## 2025-08-07 ASSESSMENT — PAIN SCALES - WONG BAKER: WONGBAKER_NUMERICALRESPONSE: HURTS A LITTLE BIT

## 2025-08-08 PROBLEM — E10.9 TYPE 1 DIABETES MELLITUS WITHOUT COMPLICATION (HCC): Status: ACTIVE | Noted: 2025-08-08

## 2025-08-08 PROBLEM — E83.42 HYPOMAGNESEMIA: Status: ACTIVE | Noted: 2025-08-08

## 2025-08-08 LAB
ANION GAP SERPL CALCULATED.3IONS-SCNC: 13 MMOL/L (ref 7–16)
BASOPHILS # BLD: 0.04 K/UL (ref 0–0.2)
BASOPHILS NFR BLD: 0 % (ref 0–2)
BUN SERPL-MCNC: 5 MG/DL (ref 6–20)
CALCIUM SERPL-MCNC: 8.7 MG/DL (ref 8.6–10)
CHLORIDE SERPL-SCNC: 104 MMOL/L (ref 98–107)
CO2 SERPL-SCNC: 22 MMOL/L (ref 22–29)
CREAT SERPL-MCNC: 0.8 MG/DL (ref 0.5–1)
EOSINOPHIL # BLD: 0.1 K/UL (ref 0.05–0.5)
EOSINOPHILS RELATIVE PERCENT: 1 % (ref 0–6)
ERYTHROCYTE [DISTWIDTH] IN BLOOD BY AUTOMATED COUNT: 13 % (ref 11.5–15)
GFR, ESTIMATED: >90 ML/MIN/1.73M2
GLUCOSE SERPL-MCNC: 100 MG/DL (ref 74–99)
HCT VFR BLD AUTO: 29.9 % (ref 34–48)
HGB BLD-MCNC: 10.3 G/DL (ref 11.5–15.5)
IMM GRANULOCYTES # BLD AUTO: <0.03 K/UL (ref 0–0.58)
IMM GRANULOCYTES NFR BLD: 0 % (ref 0–5)
LYMPHOCYTES NFR BLD: 3.57 K/UL (ref 1.5–4)
LYMPHOCYTES RELATIVE PERCENT: 39 % (ref 20–42)
MAGNESIUM SERPL-MCNC: 1.3 MG/DL (ref 1.6–2.6)
MCH RBC QN AUTO: 28.1 PG (ref 26–35)
MCHC RBC AUTO-ENTMCNC: 34.4 G/DL (ref 32–34.5)
MCV RBC AUTO: 81.7 FL (ref 80–99.9)
MONOCYTES NFR BLD: 0.54 K/UL (ref 0.1–0.95)
MONOCYTES NFR BLD: 6 % (ref 2–12)
NEUTROPHILS NFR BLD: 53 % (ref 43–80)
NEUTS SEG NFR BLD: 4.81 K/UL (ref 1.8–7.3)
PHOSPHATE SERPL-MCNC: 3.4 MG/DL (ref 2.5–4.5)
PLATELET # BLD AUTO: 272 K/UL (ref 130–450)
PMV BLD AUTO: 10.7 FL (ref 7–12)
POTASSIUM SERPL-SCNC: 3 MMOL/L (ref 3.5–5.1)
RBC # BLD AUTO: 3.66 M/UL (ref 3.5–5.5)
SODIUM SERPL-SCNC: 139 MMOL/L (ref 136–145)
WBC OTHER # BLD: 9.1 K/UL (ref 4.5–11.5)

## 2025-08-08 PROCEDURE — 6370000000 HC RX 637 (ALT 250 FOR IP): Performed by: INTERNAL MEDICINE

## 2025-08-08 PROCEDURE — 6370000000 HC RX 637 (ALT 250 FOR IP): Performed by: NURSE PRACTITIONER

## 2025-08-08 PROCEDURE — 96367 TX/PROPH/DG ADDL SEQ IV INF: CPT

## 2025-08-08 PROCEDURE — 96366 THER/PROPH/DIAG IV INF ADDON: CPT

## 2025-08-08 PROCEDURE — 6360000002 HC RX W HCPCS: Performed by: NURSE PRACTITIONER

## 2025-08-08 PROCEDURE — 96376 TX/PRO/DX INJ SAME DRUG ADON: CPT

## 2025-08-08 PROCEDURE — 80048 BASIC METABOLIC PNL TOTAL CA: CPT

## 2025-08-08 PROCEDURE — 83735 ASSAY OF MAGNESIUM: CPT

## 2025-08-08 PROCEDURE — 85025 COMPLETE CBC W/AUTO DIFF WBC: CPT

## 2025-08-08 PROCEDURE — G0378 HOSPITAL OBSERVATION PER HR: HCPCS

## 2025-08-08 PROCEDURE — 96375 TX/PRO/DX INJ NEW DRUG ADDON: CPT

## 2025-08-08 PROCEDURE — 84100 ASSAY OF PHOSPHORUS: CPT

## 2025-08-08 PROCEDURE — 6360000002 HC RX W HCPCS: Performed by: INTERNAL MEDICINE

## 2025-08-08 PROCEDURE — 36415 COLL VENOUS BLD VENIPUNCTURE: CPT

## 2025-08-08 PROCEDURE — 99232 SBSQ HOSP IP/OBS MODERATE 35: CPT | Performed by: INTERNAL MEDICINE

## 2025-08-08 PROCEDURE — 2500000003 HC RX 250 WO HCPCS: Performed by: INTERNAL MEDICINE

## 2025-08-08 RX ORDER — POTASSIUM CHLORIDE 1500 MG/1
40 TABLET, EXTENDED RELEASE ORAL EVERY 6 HOURS
Status: COMPLETED | OUTPATIENT
Start: 2025-08-08 | End: 2025-08-08

## 2025-08-08 RX ORDER — MORPHINE SULFATE 2 MG/ML
2 INJECTION, SOLUTION INTRAMUSCULAR; INTRAVENOUS EVERY 4 HOURS PRN
Status: DISCONTINUED | OUTPATIENT
Start: 2025-08-08 | End: 2025-08-09 | Stop reason: HOSPADM

## 2025-08-08 RX ORDER — BISACODYL 10 MG
10 SUPPOSITORY, RECTAL RECTAL ONCE
Status: COMPLETED | OUTPATIENT
Start: 2025-08-08 | End: 2025-08-08

## 2025-08-08 RX ORDER — POTASSIUM CHLORIDE 1500 MG/1
40 TABLET, EXTENDED RELEASE ORAL ONCE
Status: COMPLETED | OUTPATIENT
Start: 2025-08-08 | End: 2025-08-08

## 2025-08-08 RX ORDER — MAGNESIUM SULFATE IN WATER 40 MG/ML
4000 INJECTION, SOLUTION INTRAVENOUS ONCE
Status: COMPLETED | OUTPATIENT
Start: 2025-08-08 | End: 2025-08-08

## 2025-08-08 RX ORDER — INSULIN LISPRO 100 [IU]/ML
0-4 INJECTION, SOLUTION INTRAVENOUS; SUBCUTANEOUS
Status: DISCONTINUED | OUTPATIENT
Start: 2025-08-08 | End: 2025-08-09 | Stop reason: HOSPADM

## 2025-08-08 RX ORDER — POLYETHYLENE GLYCOL 3350 17 G/17G
17 POWDER, FOR SOLUTION ORAL DAILY
Status: DISCONTINUED | OUTPATIENT
Start: 2025-08-08 | End: 2025-08-09

## 2025-08-08 RX ORDER — KETOROLAC TROMETHAMINE 30 MG/ML
30 INJECTION, SOLUTION INTRAMUSCULAR; INTRAVENOUS EVERY 6 HOURS PRN
Status: DISCONTINUED | OUTPATIENT
Start: 2025-08-08 | End: 2025-08-09 | Stop reason: HOSPADM

## 2025-08-08 RX ADMIN — METOPROLOL TARTRATE 50 MG: 50 TABLET, FILM COATED ORAL at 08:35

## 2025-08-08 RX ADMIN — MORPHINE SULFATE 2 MG: 2 INJECTION, SOLUTION INTRAMUSCULAR; INTRAVENOUS at 08:34

## 2025-08-08 RX ADMIN — DICYCLOMINE HYDROCHLORIDE 10 MG: 10 CAPSULE ORAL at 19:53

## 2025-08-08 RX ADMIN — MORPHINE SULFATE 2 MG: 2 INJECTION, SOLUTION INTRAMUSCULAR; INTRAVENOUS at 15:40

## 2025-08-08 RX ADMIN — DICYCLOMINE HYDROCHLORIDE 10 MG: 10 CAPSULE ORAL at 11:41

## 2025-08-08 RX ADMIN — OXYCODONE AND ACETAMINOPHEN 1 TABLET: 5; 325 TABLET ORAL at 05:47

## 2025-08-08 RX ADMIN — MORPHINE SULFATE 2 MG: 2 INJECTION, SOLUTION INTRAMUSCULAR; INTRAVENOUS at 23:52

## 2025-08-08 RX ADMIN — SODIUM CHLORIDE, PRESERVATIVE FREE 10 ML: 5 INJECTION INTRAVENOUS at 08:35

## 2025-08-08 RX ADMIN — OXYCODONE AND ACETAMINOPHEN 1 TABLET: 5; 325 TABLET ORAL at 12:02

## 2025-08-08 RX ADMIN — POTASSIUM CHLORIDE 40 MEQ: 1500 TABLET, EXTENDED RELEASE ORAL at 12:02

## 2025-08-08 RX ADMIN — POLYETHYLENE GLYCOL 3350 17 G: 17 POWDER, FOR SOLUTION ORAL at 11:41

## 2025-08-08 RX ADMIN — AMLODIPINE BESYLATE 10 MG: 10 TABLET ORAL at 08:35

## 2025-08-08 RX ADMIN — MORPHINE SULFATE 2 MG: 2 INJECTION, SOLUTION INTRAMUSCULAR; INTRAVENOUS at 03:17

## 2025-08-08 RX ADMIN — DICYCLOMINE HYDROCHLORIDE 10 MG: 10 CAPSULE ORAL at 05:10

## 2025-08-08 RX ADMIN — POTASSIUM CHLORIDE 40 MEQ: 1500 TABLET, EXTENDED RELEASE ORAL at 03:16

## 2025-08-08 RX ADMIN — METOPROLOL TARTRATE 50 MG: 50 TABLET, FILM COATED ORAL at 19:53

## 2025-08-08 RX ADMIN — PANTOPRAZOLE SODIUM 40 MG: 40 TABLET, DELAYED RELEASE ORAL at 08:35

## 2025-08-08 RX ADMIN — BISACODYL 10 MG: 10 SUPPOSITORY RECTAL at 11:41

## 2025-08-08 RX ADMIN — DICYCLOMINE HYDROCHLORIDE 10 MG: 10 CAPSULE ORAL at 18:08

## 2025-08-08 RX ADMIN — ZOLPIDEM TARTRATE 10 MG: 5 TABLET ORAL at 22:35

## 2025-08-08 RX ADMIN — OXYCODONE AND ACETAMINOPHEN 1 TABLET: 5; 325 TABLET ORAL at 18:08

## 2025-08-08 RX ADMIN — KETOROLAC TROMETHAMINE 30 MG: 30 INJECTION, SOLUTION INTRAMUSCULAR at 05:10

## 2025-08-08 RX ADMIN — POTASSIUM CHLORIDE 40 MEQ: 1500 TABLET, EXTENDED RELEASE ORAL at 18:08

## 2025-08-08 RX ADMIN — MORPHINE SULFATE 2 MG: 2 INJECTION, SOLUTION INTRAMUSCULAR; INTRAVENOUS at 19:52

## 2025-08-08 RX ADMIN — MAGNESIUM SULFATE HEPTAHYDRATE 4000 MG: 40 INJECTION, SOLUTION INTRAVENOUS at 13:43

## 2025-08-08 ASSESSMENT — ENCOUNTER SYMPTOMS
EYE REDNESS: 0
ABDOMINAL DISTENTION: 0
VOMITING: 0
NAUSEA: 0
EYE DISCHARGE: 0
SORE THROAT: 0
ABDOMINAL PAIN: 1
WHEEZING: 0
SINUS PRESSURE: 0
COUGH: 0
BACK PAIN: 1
DIARRHEA: 0
EYE PAIN: 0
SHORTNESS OF BREATH: 0

## 2025-08-08 ASSESSMENT — PAIN SCALES - GENERAL
PAINLEVEL_OUTOF10: 10
PAINLEVEL_OUTOF10: 8
PAINLEVEL_OUTOF10: 9
PAINLEVEL_OUTOF10: 9
PAINLEVEL_OUTOF10: 10
PAINLEVEL_OUTOF10: 9
PAINLEVEL_OUTOF10: 8
PAINLEVEL_OUTOF10: 9
PAINLEVEL_OUTOF10: 8
PAINLEVEL_OUTOF10: 10
PAINLEVEL_OUTOF10: 10
PAINLEVEL_OUTOF10: 9
PAINLEVEL_OUTOF10: 7
PAINLEVEL_OUTOF10: 8

## 2025-08-08 ASSESSMENT — PAIN DESCRIPTION - ORIENTATION
ORIENTATION: RIGHT
ORIENTATION: RIGHT;MID
ORIENTATION: MID;RIGHT
ORIENTATION: RIGHT;LEFT
ORIENTATION: RIGHT
ORIENTATION: MID;RIGHT
ORIENTATION: RIGHT;MID
ORIENTATION: RIGHT
ORIENTATION: RIGHT

## 2025-08-08 ASSESSMENT — PAIN DESCRIPTION - LOCATION
LOCATION: ABDOMEN;BACK
LOCATION: ABDOMEN;BACK
LOCATION: ABDOMEN
LOCATION: ABDOMEN;BACK
LOCATION: ABDOMEN
LOCATION: ABDOMEN
LOCATION: ABDOMEN;BACK

## 2025-08-08 ASSESSMENT — PAIN DESCRIPTION - DESCRIPTORS
DESCRIPTORS: ACHING;DISCOMFORT
DESCRIPTORS: SHARP;SORE;PRESSURE
DESCRIPTORS: ACHING;DISCOMFORT
DESCRIPTORS: ACHING;DISCOMFORT
DESCRIPTORS: SHARP;STABBING
DESCRIPTORS: ACHING;DISCOMFORT
DESCRIPTORS: ACHING;DISCOMFORT
DESCRIPTORS: SHARP
DESCRIPTORS: SORE;SHARP

## 2025-08-08 ASSESSMENT — PAIN - FUNCTIONAL ASSESSMENT
PAIN_FUNCTIONAL_ASSESSMENT: ACTIVITIES ARE NOT PREVENTED

## 2025-08-09 VITALS
DIASTOLIC BLOOD PRESSURE: 96 MMHG | HEIGHT: 61 IN | BODY MASS INDEX: 21.71 KG/M2 | OXYGEN SATURATION: 99 % | SYSTOLIC BLOOD PRESSURE: 131 MMHG | TEMPERATURE: 97.7 F | WEIGHT: 115 LBS | RESPIRATION RATE: 20 BRPM | HEART RATE: 87 BPM

## 2025-08-09 LAB
ANION GAP SERPL CALCULATED.3IONS-SCNC: 15 MMOL/L (ref 7–16)
BASOPHILS # BLD: 0.04 K/UL (ref 0–0.2)
BASOPHILS NFR BLD: 1 % (ref 0–2)
BUN SERPL-MCNC: 12 MG/DL (ref 6–20)
CALCIUM SERPL-MCNC: 8.4 MG/DL (ref 8.6–10)
CHLORIDE SERPL-SCNC: 100 MMOL/L (ref 98–107)
CO2 SERPL-SCNC: 18 MMOL/L (ref 22–29)
CREAT SERPL-MCNC: 1 MG/DL (ref 0.5–1)
EOSINOPHIL # BLD: 0.12 K/UL (ref 0.05–0.5)
EOSINOPHILS RELATIVE PERCENT: 1 % (ref 0–6)
ERYTHROCYTE [DISTWIDTH] IN BLOOD BY AUTOMATED COUNT: 13.2 % (ref 11.5–15)
GFR, ESTIMATED: 82 ML/MIN/1.73M2
GLUCOSE SERPL-MCNC: 187 MG/DL (ref 74–99)
HCT VFR BLD AUTO: 29.8 % (ref 34–48)
HGB BLD-MCNC: 10 G/DL (ref 11.5–15.5)
IMM GRANULOCYTES # BLD AUTO: <0.03 K/UL (ref 0–0.58)
IMM GRANULOCYTES NFR BLD: 0 % (ref 0–5)
LYMPHOCYTES NFR BLD: 2.89 K/UL (ref 1.5–4)
LYMPHOCYTES RELATIVE PERCENT: 33 % (ref 20–42)
MAGNESIUM SERPL-MCNC: 2.2 MG/DL (ref 1.6–2.6)
MCH RBC QN AUTO: 28.3 PG (ref 26–35)
MCHC RBC AUTO-ENTMCNC: 33.6 G/DL (ref 32–34.5)
MCV RBC AUTO: 84.4 FL (ref 80–99.9)
MONOCYTES NFR BLD: 0.4 K/UL (ref 0.1–0.95)
MONOCYTES NFR BLD: 5 % (ref 2–12)
NEUTROPHILS NFR BLD: 60 % (ref 43–80)
NEUTS SEG NFR BLD: 5.28 K/UL (ref 1.8–7.3)
PHOSPHATE SERPL-MCNC: 3 MG/DL (ref 2.5–4.5)
PLATELET # BLD AUTO: 272 K/UL (ref 130–450)
PMV BLD AUTO: 10.9 FL (ref 7–12)
POTASSIUM SERPL-SCNC: 3.8 MMOL/L (ref 3.5–5.1)
RBC # BLD AUTO: 3.53 M/UL (ref 3.5–5.5)
SODIUM SERPL-SCNC: 133 MMOL/L (ref 136–145)
WBC OTHER # BLD: 8.8 K/UL (ref 4.5–11.5)

## 2025-08-09 PROCEDURE — 84100 ASSAY OF PHOSPHORUS: CPT

## 2025-08-09 PROCEDURE — 6370000000 HC RX 637 (ALT 250 FOR IP): Performed by: INTERNAL MEDICINE

## 2025-08-09 PROCEDURE — 85025 COMPLETE CBC W/AUTO DIFF WBC: CPT

## 2025-08-09 PROCEDURE — 96376 TX/PRO/DX INJ SAME DRUG ADON: CPT

## 2025-08-09 PROCEDURE — G0378 HOSPITAL OBSERVATION PER HR: HCPCS

## 2025-08-09 PROCEDURE — 80048 BASIC METABOLIC PNL TOTAL CA: CPT

## 2025-08-09 PROCEDURE — 83735 ASSAY OF MAGNESIUM: CPT

## 2025-08-09 PROCEDURE — 36415 COLL VENOUS BLD VENIPUNCTURE: CPT

## 2025-08-09 PROCEDURE — 6360000002 HC RX W HCPCS: Performed by: NURSE PRACTITIONER

## 2025-08-09 PROCEDURE — 6370000000 HC RX 637 (ALT 250 FOR IP): Performed by: NURSE PRACTITIONER

## 2025-08-09 PROCEDURE — 99239 HOSP IP/OBS DSCHRG MGMT >30: CPT | Performed by: INTERNAL MEDICINE

## 2025-08-09 RX ORDER — INSULIN LISPRO 100 [IU]/ML
2 INJECTION, SOLUTION INTRAVENOUS; SUBCUTANEOUS ONCE
Status: COMPLETED | OUTPATIENT
Start: 2025-08-09 | End: 2025-08-09

## 2025-08-09 RX ADMIN — MORPHINE SULFATE 2 MG: 2 INJECTION, SOLUTION INTRAMUSCULAR; INTRAVENOUS at 04:05

## 2025-08-09 RX ADMIN — METOPROLOL TARTRATE 50 MG: 50 TABLET, FILM COATED ORAL at 11:44

## 2025-08-09 RX ADMIN — DICYCLOMINE HYDROCHLORIDE 10 MG: 10 CAPSULE ORAL at 05:13

## 2025-08-09 RX ADMIN — INSULIN LISPRO 1 UNITS: 100 INJECTION, SOLUTION INTRAVENOUS; SUBCUTANEOUS at 12:34

## 2025-08-09 RX ADMIN — OXYCODONE AND ACETAMINOPHEN 1 TABLET: 5; 325 TABLET ORAL at 06:52

## 2025-08-09 RX ADMIN — MORPHINE SULFATE 2 MG: 2 INJECTION, SOLUTION INTRAMUSCULAR; INTRAVENOUS at 08:20

## 2025-08-09 RX ADMIN — INSULIN LISPRO 2 UNITS: 100 INJECTION, SOLUTION INTRAVENOUS; SUBCUTANEOUS at 03:13

## 2025-08-09 RX ADMIN — PANTOPRAZOLE SODIUM 40 MG: 40 TABLET, DELAYED RELEASE ORAL at 11:44

## 2025-08-09 RX ADMIN — OXYCODONE AND ACETAMINOPHEN 1 TABLET: 5; 325 TABLET ORAL at 12:34

## 2025-08-09 RX ADMIN — DICYCLOMINE HYDROCHLORIDE 10 MG: 10 CAPSULE ORAL at 11:49

## 2025-08-09 RX ADMIN — INSULIN LISPRO 1 UNITS: 100 INJECTION, SOLUTION INTRAVENOUS; SUBCUTANEOUS at 05:14

## 2025-08-09 RX ADMIN — OXYCODONE AND ACETAMINOPHEN 1 TABLET: 5; 325 TABLET ORAL at 00:55

## 2025-08-09 RX ADMIN — AMLODIPINE BESYLATE 10 MG: 10 TABLET ORAL at 11:44

## 2025-08-09 RX ADMIN — MORPHINE SULFATE 2 MG: 2 INJECTION, SOLUTION INTRAMUSCULAR; INTRAVENOUS at 14:21

## 2025-08-09 ASSESSMENT — PAIN DESCRIPTION - ORIENTATION
ORIENTATION: RIGHT

## 2025-08-09 ASSESSMENT — PAIN DESCRIPTION - LOCATION
LOCATION: ABDOMEN
LOCATION: ABDOMEN;BACK
LOCATION: ABDOMEN
LOCATION: ABDOMEN
LOCATION: ABDOMEN;BACK
LOCATION: ABDOMEN;ARM
LOCATION: ABDOMEN;BACK

## 2025-08-09 ASSESSMENT — PAIN DESCRIPTION - DESCRIPTORS
DESCRIPTORS: ACHING;DISCOMFORT
DESCRIPTORS: ACHING;CRAMPING

## 2025-08-09 ASSESSMENT — PAIN - FUNCTIONAL ASSESSMENT
PAIN_FUNCTIONAL_ASSESSMENT: 0-10
PAIN_FUNCTIONAL_ASSESSMENT: ACTIVITIES ARE NOT PREVENTED
PAIN_FUNCTIONAL_ASSESSMENT: 0-10
PAIN_FUNCTIONAL_ASSESSMENT: ACTIVITIES ARE NOT PREVENTED
PAIN_FUNCTIONAL_ASSESSMENT: ACTIVITIES ARE NOT PREVENTED
PAIN_FUNCTIONAL_ASSESSMENT: 0-10
PAIN_FUNCTIONAL_ASSESSMENT: 0-10
PAIN_FUNCTIONAL_ASSESSMENT: ACTIVITIES ARE NOT PREVENTED

## 2025-08-09 ASSESSMENT — PAIN SCALES - GENERAL
PAINLEVEL_OUTOF10: 7
PAINLEVEL_OUTOF10: 8
PAINLEVEL_OUTOF10: 7
PAINLEVEL_OUTOF10: 9
PAINLEVEL_OUTOF10: 7
PAINLEVEL_OUTOF10: 5
PAINLEVEL_OUTOF10: 7
PAINLEVEL_OUTOF10: 6
PAINLEVEL_OUTOF10: 7
PAINLEVEL_OUTOF10: 6
PAINLEVEL_OUTOF10: 7

## 2025-08-12 ENCOUNTER — CARE COORDINATION (OUTPATIENT)
Dept: CARE COORDINATION | Age: 30
End: 2025-08-12

## 2025-08-12 DIAGNOSIS — R11.2 NAUSEA AND VOMITING, UNSPECIFIED VOMITING TYPE: Primary | ICD-10-CM

## 2025-08-14 ENCOUNTER — HOSPITAL ENCOUNTER (EMERGENCY)
Age: 30
Discharge: HOME | End: 2025-08-14
Payer: COMMERCIAL

## 2025-08-14 VITALS
RESPIRATION RATE: 16 BRPM | OXYGEN SATURATION: 100 % | DIASTOLIC BLOOD PRESSURE: 101 MMHG | TEMPERATURE: 98.3 F | HEART RATE: 100 BPM | SYSTOLIC BLOOD PRESSURE: 159 MMHG

## 2025-08-14 DIAGNOSIS — R10.84 GENERALIZED ABDOMINAL PAIN: Primary | ICD-10-CM

## 2025-08-14 LAB
ALBUMIN SERPL-MCNC: 3.8 G/DL (ref 3.5–5.2)
ALP SERPL-CCNC: 52 U/L (ref 35–104)
ALT SERPL-CCNC: 16 U/L (ref 0–35)
ANION GAP SERPL CALCULATED.3IONS-SCNC: 18 MMOL/L (ref 7–16)
AST SERPL-CCNC: 23 U/L (ref 0–35)
BASOPHILS # BLD: 0.04 K/UL (ref 0–0.2)
BASOPHILS NFR BLD: 0 % (ref 0–2)
BILIRUB SERPL-MCNC: 0.2 MG/DL (ref 0–1.2)
BILIRUB UR QL STRIP: NEGATIVE
BUN SERPL-MCNC: 15 MG/DL (ref 6–20)
CALCIUM SERPL-MCNC: 10.2 MG/DL (ref 8.6–10)
CHLORIDE SERPL-SCNC: 106 MMOL/L (ref 98–107)
CLARITY UR: CLEAR
CO2 SERPL-SCNC: 22 MMOL/L (ref 22–29)
COLOR UR: YELLOW
CREAT SERPL-MCNC: 1.1 MG/DL (ref 0.5–1)
EOSINOPHIL # BLD: 0.05 K/UL (ref 0.05–0.5)
EOSINOPHILS RELATIVE PERCENT: 0 % (ref 0–6)
ERYTHROCYTE [DISTWIDTH] IN BLOOD BY AUTOMATED COUNT: 13.5 % (ref 11.5–15)
GFR, ESTIMATED: 73 ML/MIN/1.73M2
GLUCOSE SERPL-MCNC: 204 MG/DL (ref 74–99)
GLUCOSE UR STRIP-MCNC: 500 MG/DL
HCG, URINE, POC: NEGATIVE
HCT VFR BLD AUTO: 31.7 % (ref 34–48)
HGB BLD-MCNC: 11.1 G/DL (ref 11.5–15.5)
HGB UR QL STRIP.AUTO: ABNORMAL
IMM GRANULOCYTES # BLD AUTO: 0.04 K/UL (ref 0–0.58)
IMM GRANULOCYTES NFR BLD: 0 % (ref 0–5)
KETONES UR STRIP-MCNC: NEGATIVE MG/DL
LEUKOCYTE ESTERASE UR QL STRIP: NEGATIVE
LYMPHOCYTES NFR BLD: 2.34 K/UL (ref 1.5–4)
LYMPHOCYTES RELATIVE PERCENT: 21 % (ref 20–42)
Lab: NORMAL
MCH RBC QN AUTO: 28.5 PG (ref 26–35)
MCHC RBC AUTO-ENTMCNC: 35 G/DL (ref 32–34.5)
MCV RBC AUTO: 81.3 FL (ref 80–99.9)
MONOCYTES NFR BLD: 0.48 K/UL (ref 0.1–0.95)
MONOCYTES NFR BLD: 4 % (ref 2–12)
NEGATIVE QC PASS/FAIL: NORMAL
NEUTROPHILS NFR BLD: 74 % (ref 43–80)
NEUTS SEG NFR BLD: 8.28 K/UL (ref 1.8–7.3)
NITRITE UR QL STRIP: NEGATIVE
PH UR STRIP: 8 [PH] (ref 5–8)
PLATELET # BLD AUTO: 341 K/UL (ref 130–450)
PMV BLD AUTO: 10.8 FL (ref 7–12)
POSITIVE QC PASS/FAIL: NORMAL
POTASSIUM SERPL-SCNC: 3.6 MMOL/L (ref 3.5–5.1)
PROT SERPL-MCNC: 7.6 G/DL (ref 6.4–8.3)
PROT UR STRIP-MCNC: >=300 MG/DL
RBC # BLD AUTO: 3.9 M/UL (ref 3.5–5.5)
RBC #/AREA URNS HPF: ABNORMAL /HPF
SODIUM SERPL-SCNC: 146 MMOL/L (ref 136–145)
SP GR UR STRIP: 1.02 (ref 1–1.03)
UROBILINOGEN UR STRIP-ACNC: 0.2 EU/DL (ref 0–1)
WBC #/AREA URNS HPF: ABNORMAL /HPF
WBC OTHER # BLD: 11.2 K/UL (ref 4.5–11.5)

## 2025-08-14 PROCEDURE — 96376 TX/PRO/DX INJ SAME DRUG ADON: CPT

## 2025-08-14 PROCEDURE — 81001 URINALYSIS AUTO W/SCOPE: CPT

## 2025-08-14 PROCEDURE — 96374 THER/PROPH/DIAG INJ IV PUSH: CPT

## 2025-08-14 PROCEDURE — 96375 TX/PRO/DX INJ NEW DRUG ADDON: CPT

## 2025-08-14 PROCEDURE — 2580000003 HC RX 258: Performed by: PHYSICIAN ASSISTANT

## 2025-08-14 PROCEDURE — 80053 COMPREHEN METABOLIC PANEL: CPT

## 2025-08-14 PROCEDURE — 6360000002 HC RX W HCPCS: Performed by: PHYSICIAN ASSISTANT

## 2025-08-14 PROCEDURE — 85025 COMPLETE CBC W/AUTO DIFF WBC: CPT

## 2025-08-14 PROCEDURE — 99284 EMERGENCY DEPT VISIT MOD MDM: CPT

## 2025-08-14 RX ORDER — KETOROLAC TROMETHAMINE 30 MG/ML
30 INJECTION, SOLUTION INTRAMUSCULAR; INTRAVENOUS ONCE
Status: COMPLETED | OUTPATIENT
Start: 2025-08-14 | End: 2025-08-14

## 2025-08-14 RX ORDER — 0.9 % SODIUM CHLORIDE 0.9 %
500 INTRAVENOUS SOLUTION INTRAVENOUS ONCE
Status: COMPLETED | OUTPATIENT
Start: 2025-08-14 | End: 2025-08-14

## 2025-08-14 RX ORDER — MORPHINE SULFATE 4 MG/ML
4 INJECTION, SOLUTION INTRAMUSCULAR; INTRAVENOUS ONCE
Status: COMPLETED | OUTPATIENT
Start: 2025-08-14 | End: 2025-08-14

## 2025-08-14 RX ORDER — ONDANSETRON 2 MG/ML
4 INJECTION INTRAMUSCULAR; INTRAVENOUS ONCE
Status: COMPLETED | OUTPATIENT
Start: 2025-08-14 | End: 2025-08-14

## 2025-08-14 RX ADMIN — MORPHINE SULFATE 4 MG: 4 INJECTION, SOLUTION INTRAMUSCULAR; INTRAVENOUS at 14:04

## 2025-08-14 RX ADMIN — SODIUM CHLORIDE 500 ML: 0.9 INJECTION, SOLUTION INTRAVENOUS at 14:21

## 2025-08-14 RX ADMIN — ONDANSETRON 4 MG: 2 INJECTION, SOLUTION INTRAMUSCULAR; INTRAVENOUS at 12:29

## 2025-08-14 RX ADMIN — KETOROLAC TROMETHAMINE 30 MG: 30 INJECTION, SOLUTION INTRAMUSCULAR at 13:18

## 2025-08-14 RX ADMIN — MORPHINE SULFATE 4 MG: 4 INJECTION, SOLUTION INTRAMUSCULAR; INTRAVENOUS at 12:29

## 2025-08-14 ASSESSMENT — PAIN SCALES - GENERAL
PAINLEVEL_OUTOF10: 10

## 2025-08-14 ASSESSMENT — PAIN DESCRIPTION - DESCRIPTORS
DESCRIPTORS: SHARP;STABBING
DESCRIPTORS: CRAMPING;SHARP;STABBING
DESCRIPTORS: SHARP;STABBING
DESCRIPTORS: ACHING;DISCOMFORT;BURNING;CRAMPING
DESCRIPTORS: STABBING;SHARP

## 2025-08-14 ASSESSMENT — PAIN - FUNCTIONAL ASSESSMENT
PAIN_FUNCTIONAL_ASSESSMENT: 0-10
PAIN_FUNCTIONAL_ASSESSMENT: 0-10
PAIN_FUNCTIONAL_ASSESSMENT: ACTIVITIES ARE NOT PREVENTED
PAIN_FUNCTIONAL_ASSESSMENT: 0-10
PAIN_FUNCTIONAL_ASSESSMENT: 0-10

## 2025-08-14 ASSESSMENT — PAIN DESCRIPTION - PAIN TYPE: TYPE: CHRONIC PAIN

## 2025-08-14 ASSESSMENT — PAIN DESCRIPTION - LOCATION
LOCATION: ABDOMEN

## 2025-08-14 ASSESSMENT — PAIN DESCRIPTION - ORIENTATION
ORIENTATION: MID
ORIENTATION: RIGHT;LEFT;LOWER

## 2025-08-18 ENCOUNTER — APPOINTMENT (OUTPATIENT)
Dept: ULTRASOUND IMAGING | Age: 30
End: 2025-08-18
Payer: COMMERCIAL

## 2025-08-18 ENCOUNTER — HOSPITAL ENCOUNTER (EMERGENCY)
Age: 30
Discharge: HOME | End: 2025-08-18
Payer: COMMERCIAL

## 2025-08-18 VITALS
BODY MASS INDEX: 19.83 KG/M2 | RESPIRATION RATE: 16 BRPM | HEIGHT: 61 IN | DIASTOLIC BLOOD PRESSURE: 98 MMHG | SYSTOLIC BLOOD PRESSURE: 149 MMHG | WEIGHT: 105 LBS | HEART RATE: 79 BPM | OXYGEN SATURATION: 100 % | TEMPERATURE: 98.4 F

## 2025-08-18 DIAGNOSIS — R11.0 NAUSEA: Primary | ICD-10-CM

## 2025-08-18 DIAGNOSIS — R10.9 ABDOMINAL PAIN, UNSPECIFIED ABDOMINAL LOCATION: ICD-10-CM

## 2025-08-18 DIAGNOSIS — E87.6 HYPOKALEMIA: ICD-10-CM

## 2025-08-18 LAB
ALBUMIN SERPL-MCNC: 3.5 G/DL (ref 3.5–5.2)
ALP SERPL-CCNC: 48 U/L (ref 35–104)
ALT SERPL-CCNC: 10 U/L (ref 0–35)
AMPHET UR QL SCN: NEGATIVE
ANION GAP SERPL CALCULATED.3IONS-SCNC: 13 MMOL/L (ref 7–16)
APAP SERPL-MCNC: <5 UG/ML (ref 10–30)
AST SERPL-CCNC: 18 U/L (ref 0–35)
BACTERIA URNS QL MICRO: ABNORMAL
BARBITURATES UR QL SCN: NEGATIVE
BASOPHILS # BLD: 0.05 K/UL (ref 0–0.2)
BASOPHILS NFR BLD: 1 % (ref 0–2)
BENZODIAZ UR QL: NEGATIVE
BILIRUB SERPL-MCNC: 0.3 MG/DL (ref 0–1.2)
BILIRUB UR QL STRIP: NEGATIVE
BUN SERPL-MCNC: 8 MG/DL (ref 6–20)
BUPRENORPHINE UR QL: NEGATIVE
CALCIUM SERPL-MCNC: 8.8 MG/DL (ref 8.6–10)
CANNABINOIDS UR QL SCN: POSITIVE
CASTS #/AREA URNS LPF: ABNORMAL /LPF
CHLORIDE SERPL-SCNC: 105 MMOL/L (ref 98–107)
CLARITY UR: ABNORMAL
CO2 SERPL-SCNC: 22 MMOL/L (ref 22–29)
COCAINE UR QL SCN: NEGATIVE
COLOR UR: YELLOW
CREAT SERPL-MCNC: 1.1 MG/DL (ref 0.5–1)
EOSINOPHIL # BLD: 0.05 K/UL (ref 0.05–0.5)
EOSINOPHILS RELATIVE PERCENT: 1 % (ref 0–6)
EPI CELLS #/AREA URNS HPF: ABNORMAL /HPF
ERYTHROCYTE [DISTWIDTH] IN BLOOD BY AUTOMATED COUNT: 13.1 % (ref 11.5–15)
ETHANOLAMINE SERPL-MCNC: <10 MG/DL (ref 0–0.08)
FENTANYL UR QL: NEGATIVE
GFR, ESTIMATED: 69 ML/MIN/1.73M2
GLUCOSE SERPL-MCNC: 103 MG/DL (ref 74–99)
GLUCOSE UR STRIP-MCNC: NEGATIVE MG/DL
HCG, URINE, POC: NEGATIVE
HCT VFR BLD AUTO: 32.6 % (ref 34–48)
HGB BLD-MCNC: 10.9 G/DL (ref 11.5–15.5)
HGB UR QL STRIP.AUTO: ABNORMAL
IMM GRANULOCYTES # BLD AUTO: <0.03 K/UL (ref 0–0.58)
IMM GRANULOCYTES NFR BLD: 0 % (ref 0–5)
KETONES UR STRIP-MCNC: ABNORMAL MG/DL
LACTATE BLDV-SCNC: 1.4 MMOL/L (ref 0.5–2.2)
LEUKOCYTE ESTERASE UR QL STRIP: NEGATIVE
LIPASE SERPL-CCNC: 9 U/L (ref 13–60)
LYMPHOCYTES NFR BLD: 2.6 K/UL (ref 1.5–4)
LYMPHOCYTES RELATIVE PERCENT: 35 % (ref 20–42)
Lab: NORMAL
MAGNESIUM SERPL-MCNC: 1.7 MG/DL (ref 1.6–2.6)
MCH RBC QN AUTO: 27.7 PG (ref 26–35)
MCHC RBC AUTO-ENTMCNC: 33.4 G/DL (ref 32–34.5)
MCV RBC AUTO: 82.7 FL (ref 80–99.9)
METHADONE UR QL: NEGATIVE
MONOCYTES NFR BLD: 0.4 K/UL (ref 0.1–0.95)
MONOCYTES NFR BLD: 5 % (ref 2–12)
NEGATIVE QC PASS/FAIL: NORMAL
NEUTROPHILS NFR BLD: 58 % (ref 43–80)
NEUTS SEG NFR BLD: 4.28 K/UL (ref 1.8–7.3)
NITRITE UR QL STRIP: NEGATIVE
OPIATES UR QL SCN: POSITIVE
OXYCODONE UR QL SCN: POSITIVE
PCP UR QL SCN: NEGATIVE
PH UR STRIP: 6 [PH] (ref 5–8)
PLATELET # BLD AUTO: 349 K/UL (ref 130–450)
PMV BLD AUTO: 10.2 FL (ref 7–12)
POSITIVE QC PASS/FAIL: NORMAL
POTASSIUM SERPL-SCNC: 3.3 MMOL/L (ref 3.5–5.1)
PROT SERPL-MCNC: 6.7 G/DL (ref 6.4–8.3)
PROT UR STRIP-MCNC: >=300 MG/DL
RBC # BLD AUTO: 3.94 M/UL (ref 3.5–5.5)
RBC #/AREA URNS HPF: ABNORMAL /HPF
SALICYLATES SERPL-MCNC: <0.5 MG/DL (ref 0–30)
SODIUM SERPL-SCNC: 139 MMOL/L (ref 136–145)
SP GR UR STRIP: 1.02 (ref 1–1.03)
TEST INFORMATION: ABNORMAL
TOXIC TRICYCLIC SC,BLOOD: NEGATIVE
UROBILINOGEN UR STRIP-ACNC: 0.2 EU/DL (ref 0–1)
WBC #/AREA URNS HPF: ABNORMAL /HPF
WBC OTHER # BLD: 7.4 K/UL (ref 4.5–11.5)

## 2025-08-18 PROCEDURE — 85025 COMPLETE CBC W/AUTO DIFF WBC: CPT

## 2025-08-18 PROCEDURE — 80143 DRUG ASSAY ACETAMINOPHEN: CPT

## 2025-08-18 PROCEDURE — G0480 DRUG TEST DEF 1-7 CLASSES: HCPCS

## 2025-08-18 PROCEDURE — 6360000002 HC RX W HCPCS: Performed by: PHYSICIAN ASSISTANT

## 2025-08-18 PROCEDURE — 96361 HYDRATE IV INFUSION ADD-ON: CPT

## 2025-08-18 PROCEDURE — 80053 COMPREHEN METABOLIC PANEL: CPT

## 2025-08-18 PROCEDURE — 96372 THER/PROPH/DIAG INJ SC/IM: CPT

## 2025-08-18 PROCEDURE — 81001 URINALYSIS AUTO W/SCOPE: CPT

## 2025-08-18 PROCEDURE — 99284 EMERGENCY DEPT VISIT MOD MDM: CPT

## 2025-08-18 PROCEDURE — 83735 ASSAY OF MAGNESIUM: CPT

## 2025-08-18 PROCEDURE — 6370000000 HC RX 637 (ALT 250 FOR IP): Performed by: PHYSICIAN ASSISTANT

## 2025-08-18 PROCEDURE — 96360 HYDRATION IV INFUSION INIT: CPT

## 2025-08-18 PROCEDURE — 83605 ASSAY OF LACTIC ACID: CPT

## 2025-08-18 PROCEDURE — 76705 ECHO EXAM OF ABDOMEN: CPT

## 2025-08-18 PROCEDURE — 80179 DRUG ASSAY SALICYLATE: CPT

## 2025-08-18 PROCEDURE — 83690 ASSAY OF LIPASE: CPT

## 2025-08-18 PROCEDURE — 80307 DRUG TEST PRSMV CHEM ANLYZR: CPT

## 2025-08-18 PROCEDURE — 2580000003 HC RX 258: Performed by: PHYSICIAN ASSISTANT

## 2025-08-18 RX ORDER — MORPHINE SULFATE 4 MG/ML
5 INJECTION, SOLUTION INTRAMUSCULAR; INTRAVENOUS ONCE
Status: COMPLETED | OUTPATIENT
Start: 2025-08-18 | End: 2025-08-18

## 2025-08-18 RX ORDER — POTASSIUM CHLORIDE 1500 MG/1
40 TABLET, EXTENDED RELEASE ORAL ONCE
Status: COMPLETED | OUTPATIENT
Start: 2025-08-18 | End: 2025-08-18

## 2025-08-18 RX ORDER — 0.9 % SODIUM CHLORIDE 0.9 %
1000 INTRAVENOUS SOLUTION INTRAVENOUS ONCE
Status: COMPLETED | OUTPATIENT
Start: 2025-08-18 | End: 2025-08-18

## 2025-08-18 RX ORDER — MORPHINE SULFATE 4 MG/ML
5 INJECTION, SOLUTION INTRAMUSCULAR; INTRAVENOUS ONCE
Refills: 0 | Status: DISCONTINUED | OUTPATIENT
Start: 2025-08-18 | End: 2025-08-18

## 2025-08-18 RX ADMIN — MORPHINE SULFATE 5 MG: 4 INJECTION, SOLUTION INTRAMUSCULAR; INTRAVENOUS at 09:30

## 2025-08-18 RX ADMIN — POTASSIUM CHLORIDE 40 MEQ: 20 TABLET, EXTENDED RELEASE ORAL at 11:38

## 2025-08-18 RX ADMIN — SODIUM CHLORIDE 1000 ML: 0.9 INJECTION, SOLUTION INTRAVENOUS at 09:38

## 2025-08-18 ASSESSMENT — PAIN - FUNCTIONAL ASSESSMENT: PAIN_FUNCTIONAL_ASSESSMENT: 0-10

## 2025-08-18 ASSESSMENT — PAIN DESCRIPTION - LOCATION: LOCATION: ABDOMEN

## 2025-08-18 ASSESSMENT — PAIN SCALES - GENERAL: PAINLEVEL_OUTOF10: 9

## 2025-08-19 ENCOUNTER — HOSPITAL ENCOUNTER (EMERGENCY)
Age: 30
Discharge: HOME | End: 2025-08-19
Attending: STUDENT IN AN ORGANIZED HEALTH CARE EDUCATION/TRAINING PROGRAM
Payer: COMMERCIAL

## 2025-08-19 ENCOUNTER — APPOINTMENT (OUTPATIENT)
Dept: CT IMAGING | Age: 30
End: 2025-08-19
Payer: COMMERCIAL

## 2025-08-19 ENCOUNTER — TELEPHONE (OUTPATIENT)
Dept: PRIMARY CARE CLINIC | Age: 30
End: 2025-08-19

## 2025-08-19 VITALS
SYSTOLIC BLOOD PRESSURE: 162 MMHG | WEIGHT: 104 LBS | OXYGEN SATURATION: 100 % | HEART RATE: 92 BPM | BODY MASS INDEX: 19.63 KG/M2 | DIASTOLIC BLOOD PRESSURE: 116 MMHG | TEMPERATURE: 98.2 F | RESPIRATION RATE: 18 BRPM | HEIGHT: 61 IN

## 2025-08-19 DIAGNOSIS — I10 ESSENTIAL HYPERTENSION: Primary | ICD-10-CM

## 2025-08-19 DIAGNOSIS — R51.9 ACUTE NONINTRACTABLE HEADACHE, UNSPECIFIED HEADACHE TYPE: ICD-10-CM

## 2025-08-19 DIAGNOSIS — R80.9 PROTEINURIA, UNSPECIFIED TYPE: ICD-10-CM

## 2025-08-19 DIAGNOSIS — M54.50 ACUTE BILATERAL LOW BACK PAIN WITHOUT SCIATICA: ICD-10-CM

## 2025-08-19 LAB
ALBUMIN SERPL-MCNC: 4.1 G/DL (ref 3.5–5.2)
ALP SERPL-CCNC: 54 U/L (ref 35–104)
ALT SERPL-CCNC: 11 U/L (ref 0–35)
ANION GAP SERPL CALCULATED.3IONS-SCNC: 15 MMOL/L (ref 7–16)
AST SERPL-CCNC: 30 U/L (ref 0–35)
BACTERIA URNS QL MICRO: ABNORMAL
BASOPHILS # BLD: 0.07 K/UL (ref 0–0.2)
BASOPHILS NFR BLD: 1 % (ref 0–2)
BILIRUB SERPL-MCNC: 0.3 MG/DL (ref 0–1.2)
BILIRUB UR QL STRIP: NEGATIVE
BUN SERPL-MCNC: 8 MG/DL (ref 6–20)
CALCIUM SERPL-MCNC: 9.4 MG/DL (ref 8.6–10)
CASTS #/AREA URNS LPF: ABNORMAL /LPF
CHLORIDE SERPL-SCNC: 105 MMOL/L (ref 98–107)
CLARITY UR: CLEAR
CO2 SERPL-SCNC: 21 MMOL/L (ref 22–29)
COLOR UR: YELLOW
CREAT SERPL-MCNC: 1.3 MG/DL (ref 0.5–1)
EKG ATRIAL RATE: 91 BPM
EKG P AXIS: 73 DEGREES
EKG P-R INTERVAL: 152 MS
EKG Q-T INTERVAL: 348 MS
EKG QRS DURATION: 64 MS
EKG QTC CALCULATION (BAZETT): 428 MS
EKG R AXIS: 42 DEGREES
EKG T AXIS: 68 DEGREES
EKG VENTRICULAR RATE: 91 BPM
EOSINOPHIL # BLD: 0.08 K/UL (ref 0.05–0.5)
EOSINOPHILS RELATIVE PERCENT: 1 % (ref 0–6)
EPI CELLS #/AREA URNS HPF: ABNORMAL /HPF
ERYTHROCYTE [DISTWIDTH] IN BLOOD BY AUTOMATED COUNT: 13 % (ref 11.5–15)
GFR, ESTIMATED: 57 ML/MIN/1.73M2
GLUCOSE BLD-MCNC: 147 MG/DL (ref 74–99)
GLUCOSE SERPL-MCNC: 112 MG/DL (ref 74–99)
GLUCOSE UR STRIP-MCNC: NEGATIVE MG/DL
HCT VFR BLD AUTO: 34.4 % (ref 34–48)
HGB BLD-MCNC: 11.3 G/DL (ref 11.5–15.5)
HGB UR QL STRIP.AUTO: ABNORMAL
IMM GRANULOCYTES # BLD AUTO: <0.03 K/UL (ref 0–0.58)
IMM GRANULOCYTES NFR BLD: 0 % (ref 0–5)
KETONES UR STRIP-MCNC: NEGATIVE MG/DL
LACTATE BLDV-SCNC: 2 MMOL/L (ref 0.5–2.2)
LEUKOCYTE ESTERASE UR QL STRIP: NEGATIVE
LIPASE SERPL-CCNC: 9 U/L (ref 13–60)
LYMPHOCYTES NFR BLD: 3.14 K/UL (ref 1.5–4)
LYMPHOCYTES RELATIVE PERCENT: 44 % (ref 20–42)
MCH RBC QN AUTO: 27.4 PG (ref 26–35)
MCHC RBC AUTO-ENTMCNC: 32.8 G/DL (ref 32–34.5)
MCV RBC AUTO: 83.3 FL (ref 80–99.9)
MONOCYTES NFR BLD: 0.4 K/UL (ref 0.1–0.95)
MONOCYTES NFR BLD: 6 % (ref 2–12)
MUCOUS THREADS URNS QL MICRO: PRESENT
NEUTROPHILS NFR BLD: 49 % (ref 43–80)
NEUTS SEG NFR BLD: 3.5 K/UL (ref 1.8–7.3)
NITRITE UR QL STRIP: NEGATIVE
PH UR STRIP: 6 [PH] (ref 5–8)
PLATELET # BLD AUTO: 360 K/UL (ref 130–450)
PMV BLD AUTO: 10.8 FL (ref 7–12)
POTASSIUM SERPL-SCNC: 4.2 MMOL/L (ref 3.5–5.1)
PROT SERPL-MCNC: 7.8 G/DL (ref 6.4–8.3)
PROT UR STRIP-MCNC: >=300 MG/DL
RBC # BLD AUTO: 4.13 M/UL (ref 3.5–5.5)
RBC #/AREA URNS HPF: ABNORMAL /HPF
SODIUM SERPL-SCNC: 141 MMOL/L (ref 136–145)
SP GR UR STRIP: 1.02 (ref 1–1.03)
TROPONIN I SERPL HS-MCNC: 22 NG/L (ref 0–14)
TROPONIN I SERPL HS-MCNC: 27 NG/L (ref 0–14)
UROBILINOGEN UR STRIP-ACNC: 0.2 EU/DL (ref 0–1)
WBC #/AREA URNS HPF: ABNORMAL /HPF
WBC OTHER # BLD: 7.2 K/UL (ref 4.5–11.5)

## 2025-08-19 PROCEDURE — 70450 CT HEAD/BRAIN W/O DYE: CPT

## 2025-08-19 PROCEDURE — 83690 ASSAY OF LIPASE: CPT

## 2025-08-19 PROCEDURE — 2580000003 HC RX 258: Performed by: STUDENT IN AN ORGANIZED HEALTH CARE EDUCATION/TRAINING PROGRAM

## 2025-08-19 PROCEDURE — 85025 COMPLETE CBC W/AUTO DIFF WBC: CPT

## 2025-08-19 PROCEDURE — 80053 COMPREHEN METABOLIC PANEL: CPT

## 2025-08-19 PROCEDURE — 99284 EMERGENCY DEPT VISIT MOD MDM: CPT

## 2025-08-19 PROCEDURE — 96375 TX/PRO/DX INJ NEW DRUG ADDON: CPT

## 2025-08-19 PROCEDURE — 81001 URINALYSIS AUTO W/SCOPE: CPT

## 2025-08-19 PROCEDURE — 6370000000 HC RX 637 (ALT 250 FOR IP): Performed by: STUDENT IN AN ORGANIZED HEALTH CARE EDUCATION/TRAINING PROGRAM

## 2025-08-19 PROCEDURE — 83605 ASSAY OF LACTIC ACID: CPT

## 2025-08-19 PROCEDURE — 96374 THER/PROPH/DIAG INJ IV PUSH: CPT

## 2025-08-19 PROCEDURE — 6360000002 HC RX W HCPCS: Performed by: STUDENT IN AN ORGANIZED HEALTH CARE EDUCATION/TRAINING PROGRAM

## 2025-08-19 PROCEDURE — 84484 ASSAY OF TROPONIN QUANT: CPT

## 2025-08-19 PROCEDURE — 87086 URINE CULTURE/COLONY COUNT: CPT

## 2025-08-19 PROCEDURE — 96372 THER/PROPH/DIAG INJ SC/IM: CPT

## 2025-08-19 PROCEDURE — 93005 ELECTROCARDIOGRAM TRACING: CPT | Performed by: STUDENT IN AN ORGANIZED HEALTH CARE EDUCATION/TRAINING PROGRAM

## 2025-08-19 PROCEDURE — 93010 ELECTROCARDIOGRAM REPORT: CPT | Performed by: INTERNAL MEDICINE

## 2025-08-19 PROCEDURE — 82962 GLUCOSE BLOOD TEST: CPT

## 2025-08-19 RX ORDER — KETOROLAC TROMETHAMINE 30 MG/ML
15 INJECTION, SOLUTION INTRAMUSCULAR; INTRAVENOUS ONCE
Status: COMPLETED | OUTPATIENT
Start: 2025-08-19 | End: 2025-08-19

## 2025-08-19 RX ORDER — AMLODIPINE BESYLATE 5 MG/1
10 TABLET ORAL DAILY
Status: DISCONTINUED | OUTPATIENT
Start: 2025-08-19 | End: 2025-08-19 | Stop reason: HOSPADM

## 2025-08-19 RX ORDER — DIPHENHYDRAMINE HYDROCHLORIDE 50 MG/ML
25 INJECTION, SOLUTION INTRAMUSCULAR; INTRAVENOUS ONCE
Status: COMPLETED | OUTPATIENT
Start: 2025-08-19 | End: 2025-08-19

## 2025-08-19 RX ORDER — 0.9 % SODIUM CHLORIDE 0.9 %
1000 INTRAVENOUS SOLUTION INTRAVENOUS ONCE
Status: COMPLETED | OUTPATIENT
Start: 2025-08-19 | End: 2025-08-19

## 2025-08-19 RX ORDER — METOCLOPRAMIDE HYDROCHLORIDE 5 MG/ML
10 INJECTION INTRAMUSCULAR; INTRAVENOUS ONCE
Status: COMPLETED | OUTPATIENT
Start: 2025-08-19 | End: 2025-08-19

## 2025-08-19 RX ORDER — 0.9 % SODIUM CHLORIDE 0.9 %
500 INTRAVENOUS SOLUTION INTRAVENOUS ONCE
Status: COMPLETED | OUTPATIENT
Start: 2025-08-19 | End: 2025-08-19

## 2025-08-19 RX ORDER — ORPHENADRINE CITRATE 30 MG/ML
60 INJECTION INTRAMUSCULAR; INTRAVENOUS ONCE
Status: COMPLETED | OUTPATIENT
Start: 2025-08-19 | End: 2025-08-19

## 2025-08-19 RX ADMIN — ORPHENADRINE CITRATE 60 MG: 60 INJECTION INTRAMUSCULAR; INTRAVENOUS at 16:00

## 2025-08-19 RX ADMIN — AMLODIPINE BESYLATE 10 MG: 5 TABLET ORAL at 16:34

## 2025-08-19 RX ADMIN — SODIUM CHLORIDE 1000 ML: 9 INJECTION, SOLUTION INTRAVENOUS at 14:18

## 2025-08-19 RX ADMIN — METOCLOPRAMIDE 10 MG: 5 INJECTION, SOLUTION INTRAMUSCULAR; INTRAVENOUS at 15:05

## 2025-08-19 RX ADMIN — KETOROLAC TROMETHAMINE 15 MG: 30 INJECTION, SOLUTION INTRAMUSCULAR at 15:59

## 2025-08-19 RX ADMIN — SODIUM CHLORIDE 500 ML: 0.9 INJECTION, SOLUTION INTRAVENOUS at 15:05

## 2025-08-19 RX ADMIN — DIPHENHYDRAMINE HYDROCHLORIDE 25 MG: 50 INJECTION INTRAMUSCULAR; INTRAVENOUS at 15:05

## 2025-08-19 ASSESSMENT — ENCOUNTER SYMPTOMS
ABDOMINAL DISTENTION: 0
SORE THROAT: 0
SHORTNESS OF BREATH: 0
DIARRHEA: 0
BACK PAIN: 1
EYE REDNESS: 0
EYE PAIN: 0
COUGH: 0
WHEEZING: 0
VOMITING: 0
EYE DISCHARGE: 0
NAUSEA: 0
SINUS PRESSURE: 0

## 2025-08-19 ASSESSMENT — PAIN - FUNCTIONAL ASSESSMENT: PAIN_FUNCTIONAL_ASSESSMENT: 0-10

## 2025-08-19 ASSESSMENT — PAIN SCALES - GENERAL: PAINLEVEL_OUTOF10: 0

## 2025-08-20 LAB
MICROORGANISM SPEC CULT: NO GROWTH
SERVICE CMNT-IMP: NORMAL
SPECIMEN DESCRIPTION: NORMAL

## 2025-08-22 ENCOUNTER — OFFICE VISIT (OUTPATIENT)
Dept: PRIMARY CARE CLINIC | Age: 30
End: 2025-08-22
Payer: COMMERCIAL

## 2025-08-22 ENCOUNTER — TRANSCRIBE ORDERS (OUTPATIENT)
Dept: ADMINISTRATIVE | Age: 30
End: 2025-08-22

## 2025-08-22 VITALS
SYSTOLIC BLOOD PRESSURE: 120 MMHG | BODY MASS INDEX: 19.45 KG/M2 | RESPIRATION RATE: 16 BRPM | WEIGHT: 103 LBS | OXYGEN SATURATION: 98 % | HEART RATE: 88 BPM | TEMPERATURE: 98.6 F | HEIGHT: 61 IN | DIASTOLIC BLOOD PRESSURE: 70 MMHG

## 2025-08-22 DIAGNOSIS — F12.20 TETRAHYDROCANNABINOL (THC) USE DISORDER, MODERATE, DEPENDENCE (HCC): ICD-10-CM

## 2025-08-22 DIAGNOSIS — R10.30 LOWER ABDOMINAL PAIN: ICD-10-CM

## 2025-08-22 DIAGNOSIS — R10.84 ABDOMINAL PAIN, GENERALIZED: Primary | ICD-10-CM

## 2025-08-22 DIAGNOSIS — E55.9 VITAMIN D DEFICIENCY: ICD-10-CM

## 2025-08-22 DIAGNOSIS — N17.9 AKI (ACUTE KIDNEY INJURY): ICD-10-CM

## 2025-08-22 DIAGNOSIS — F32.A DEPRESSIVE DISORDER: ICD-10-CM

## 2025-08-22 DIAGNOSIS — G89.29 CHRONIC MIDLINE LOW BACK PAIN WITHOUT SCIATICA: ICD-10-CM

## 2025-08-22 DIAGNOSIS — E10.65 POORLY CONTROLLED TYPE 1 DIABETES MELLITUS (HCC): ICD-10-CM

## 2025-08-22 DIAGNOSIS — R11.2 CANNABINOID HYPEREMESIS SYNDROME: ICD-10-CM

## 2025-08-22 DIAGNOSIS — Z09 HOSPITAL DISCHARGE FOLLOW-UP: Primary | ICD-10-CM

## 2025-08-22 DIAGNOSIS — F12.90 CANNABINOID HYPEREMESIS SYNDROME: ICD-10-CM

## 2025-08-22 DIAGNOSIS — M54.50 CHRONIC MIDLINE LOW BACK PAIN WITHOUT SCIATICA: ICD-10-CM

## 2025-08-22 DIAGNOSIS — I10 PRIMARY HYPERTENSION: ICD-10-CM

## 2025-08-22 DIAGNOSIS — R03.0 ELEVATED BLOOD PRESSURE READING WITHOUT DIAGNOSIS OF HYPERTENSION: ICD-10-CM

## 2025-08-22 PROBLEM — O24.011: Status: RESOLVED | Noted: 2022-10-06 | Resolved: 2025-08-22

## 2025-08-22 PROBLEM — E11.65 HYPERGLYCEMIA DUE TO DIABETES MELLITUS (HCC): Status: RESOLVED | Noted: 2021-09-13 | Resolved: 2025-08-22

## 2025-08-22 PROBLEM — E83.42 HYPOMAGNESEMIA: Status: RESOLVED | Noted: 2025-08-08 | Resolved: 2025-08-22

## 2025-08-22 PROBLEM — Z91.199 MEDICAL NON-COMPLIANCE: Status: RESOLVED | Noted: 2018-11-12 | Resolved: 2025-08-22

## 2025-08-22 PROBLEM — E43 SEVERE PROTEIN-CALORIE MALNUTRITION: Chronic | Status: RESOLVED | Noted: 2020-08-31 | Resolved: 2025-08-22

## 2025-08-22 PROBLEM — R73.9 HYPERGLYCEMIA: Status: RESOLVED | Noted: 2020-03-06 | Resolved: 2025-08-22

## 2025-08-22 PROBLEM — E87.0 HYPERNATREMIA: Status: RESOLVED | Noted: 2025-08-07 | Resolved: 2025-08-22

## 2025-08-22 PROBLEM — E10.9 TYPE 1 DIABETES MELLITUS WITHOUT COMPLICATION (HCC): Status: RESOLVED | Noted: 2025-08-08 | Resolved: 2025-08-22

## 2025-08-22 LAB — HBA1C MFR BLD: 8.1 %

## 2025-08-22 PROCEDURE — 3052F HG A1C>EQUAL 8.0%<EQUAL 9.0%: CPT

## 2025-08-22 PROCEDURE — 99214 OFFICE O/P EST MOD 30 MIN: CPT

## 2025-08-22 PROCEDURE — 3074F SYST BP LT 130 MM HG: CPT

## 2025-08-22 PROCEDURE — 3078F DIAST BP <80 MM HG: CPT

## 2025-08-22 PROCEDURE — 83036 HEMOGLOBIN GLYCOSYLATED A1C: CPT

## 2025-08-22 PROCEDURE — 1111F DSCHRG MED/CURRENT MED MERGE: CPT

## 2025-08-22 RX ORDER — METOPROLOL TARTRATE 50 MG
50 TABLET ORAL 2 TIMES DAILY
Qty: 180 TABLET | Refills: 0 | Status: SHIPPED | OUTPATIENT
Start: 2025-08-22

## 2025-08-22 RX ORDER — AMLODIPINE BESYLATE 10 MG/1
10 TABLET ORAL DAILY
Qty: 90 TABLET | Refills: 0 | Status: SHIPPED | OUTPATIENT
Start: 2025-08-22

## 2025-08-22 RX ORDER — DULOXETIN HYDROCHLORIDE 30 MG/1
30 CAPSULE, DELAYED RELEASE ORAL DAILY
Qty: 30 CAPSULE | Refills: 1 | Status: SHIPPED | OUTPATIENT
Start: 2025-08-22

## 2025-08-22 RX ORDER — LISINOPRIL 10 MG/1
10 TABLET ORAL DAILY
Qty: 90 TABLET | Refills: 0 | Status: SHIPPED | OUTPATIENT
Start: 2025-08-22

## 2025-09-03 ENCOUNTER — TELEPHONE (OUTPATIENT)
Dept: PRIMARY CARE CLINIC | Age: 30
End: 2025-09-03

## 2025-09-05 ENCOUNTER — OFFICE VISIT (OUTPATIENT)
Dept: PRIMARY CARE CLINIC | Age: 30
End: 2025-09-05
Payer: COMMERCIAL

## 2025-09-05 VITALS
HEIGHT: 61 IN | WEIGHT: 108 LBS | RESPIRATION RATE: 17 BRPM | SYSTOLIC BLOOD PRESSURE: 130 MMHG | TEMPERATURE: 97.6 F | BODY MASS INDEX: 20.39 KG/M2 | HEART RATE: 85 BPM | OXYGEN SATURATION: 98 % | DIASTOLIC BLOOD PRESSURE: 85 MMHG

## 2025-09-05 DIAGNOSIS — F32.A DEPRESSIVE DISORDER: ICD-10-CM

## 2025-09-05 DIAGNOSIS — N17.9 AKI (ACUTE KIDNEY INJURY): ICD-10-CM

## 2025-09-05 DIAGNOSIS — I10 PRIMARY HYPERTENSION: Primary | ICD-10-CM

## 2025-09-05 DIAGNOSIS — M54.50 CHRONIC MIDLINE LOW BACK PAIN WITHOUT SCIATICA: ICD-10-CM

## 2025-09-05 DIAGNOSIS — G89.29 CHRONIC MIDLINE LOW BACK PAIN WITHOUT SCIATICA: ICD-10-CM

## 2025-09-05 DIAGNOSIS — R10.30 LOWER ABDOMINAL PAIN: ICD-10-CM

## 2025-09-05 PROCEDURE — 3075F SYST BP GE 130 - 139MM HG: CPT

## 2025-09-05 PROCEDURE — 3079F DIAST BP 80-89 MM HG: CPT

## 2025-09-05 PROCEDURE — 99214 OFFICE O/P EST MOD 30 MIN: CPT

## 2025-09-05 RX ORDER — LISINOPRIL 20 MG/1
20 TABLET ORAL DAILY
Status: CANCELLED | OUTPATIENT
Start: 2025-09-05

## 2025-09-05 ASSESSMENT — ENCOUNTER SYMPTOMS
NAUSEA: 1
DIARRHEA: 0
ABDOMINAL PAIN: 1
COUGH: 0
SHORTNESS OF BREATH: 0
CONSTIPATION: 0
VOMITING: 1
COLOR CHANGE: 0

## 2025-09-06 ENCOUNTER — APPOINTMENT (OUTPATIENT)
Dept: ULTRASOUND IMAGING | Age: 30
End: 2025-09-06
Payer: COMMERCIAL

## 2025-09-06 ENCOUNTER — HOSPITAL ENCOUNTER (EMERGENCY)
Age: 30
Discharge: HOME | End: 2025-09-06
Attending: EMERGENCY MEDICINE
Payer: COMMERCIAL

## 2025-09-06 VITALS
RESPIRATION RATE: 16 BRPM | SYSTOLIC BLOOD PRESSURE: 152 MMHG | HEART RATE: 90 BPM | DIASTOLIC BLOOD PRESSURE: 90 MMHG | TEMPERATURE: 98.1 F | OXYGEN SATURATION: 100 %

## 2025-09-06 DIAGNOSIS — R10.9 CHRONIC ABDOMINAL PAIN: ICD-10-CM

## 2025-09-06 DIAGNOSIS — G89.29 CHRONIC ABDOMINAL PAIN: ICD-10-CM

## 2025-09-06 DIAGNOSIS — R11.0 NAUSEA: ICD-10-CM

## 2025-09-06 DIAGNOSIS — R10.9 ABDOMINAL PAIN, UNSPECIFIED ABDOMINAL LOCATION: Primary | ICD-10-CM

## 2025-09-06 LAB
ALBUMIN SERPL-MCNC: 4 G/DL (ref 3.5–5.2)
ALP SERPL-CCNC: 55 U/L (ref 35–104)
ALT SERPL-CCNC: 17 U/L (ref 0–35)
ANION GAP SERPL CALCULATED.3IONS-SCNC: 14 MMOL/L (ref 7–16)
AST SERPL-CCNC: 26 U/L (ref 0–35)
B-OH-BUTYR SERPL-MCNC: 0.26 MMOL/L (ref 0.02–0.27)
BASOPHILS # BLD: 0.04 K/UL (ref 0–0.2)
BASOPHILS NFR BLD: 0 % (ref 0–2)
BILIRUB SERPL-MCNC: 0.3 MG/DL (ref 0–1.2)
BILIRUB UR QL STRIP: NEGATIVE
BUN SERPL-MCNC: 13 MG/DL (ref 6–20)
CALCIUM SERPL-MCNC: 9.9 MG/DL (ref 8.6–10)
CHLORIDE SERPL-SCNC: 105 MMOL/L (ref 98–107)
CLARITY UR: CLEAR
CO2 SERPL-SCNC: 22 MMOL/L (ref 22–29)
COLOR UR: YELLOW
CREAT SERPL-MCNC: 0.9 MG/DL (ref 0.5–1)
EOSINOPHIL # BLD: 0.05 K/UL (ref 0.05–0.5)
EOSINOPHILS RELATIVE PERCENT: 0 % (ref 0–6)
ERYTHROCYTE [DISTWIDTH] IN BLOOD BY AUTOMATED COUNT: 13.9 % (ref 11.5–15)
GFR, ESTIMATED: 85 ML/MIN/1.73M2
GLUCOSE BLD-MCNC: 109 MG/DL
GLUCOSE BLD-MCNC: 109 MG/DL (ref 74–99)
GLUCOSE SERPL-MCNC: 124 MG/DL (ref 74–99)
GLUCOSE UR STRIP-MCNC: NEGATIVE MG/DL
HCG SERPL QL: NEGATIVE
HCG UR QL: NEGATIVE
HCT VFR BLD AUTO: 34.6 % (ref 34–48)
HGB BLD-MCNC: 11.8 G/DL (ref 11.5–15.5)
HGB UR QL STRIP.AUTO: ABNORMAL
IMM GRANULOCYTES # BLD AUTO: 0.05 K/UL (ref 0–0.58)
IMM GRANULOCYTES NFR BLD: 0 % (ref 0–5)
KETONES UR STRIP-MCNC: NEGATIVE MG/DL
LACTATE BLDV-SCNC: 1.4 MMOL/L (ref 0.5–2.2)
LEUKOCYTE ESTERASE UR QL STRIP: NEGATIVE
LIPASE SERPL-CCNC: 13 U/L (ref 13–60)
LYMPHOCYTES NFR BLD: 2.26 K/UL (ref 1.5–4)
LYMPHOCYTES RELATIVE PERCENT: 20 % (ref 20–42)
MCH RBC QN AUTO: 28 PG (ref 26–35)
MCHC RBC AUTO-ENTMCNC: 34.1 G/DL (ref 32–34.5)
MCV RBC AUTO: 82.2 FL (ref 80–99.9)
MONOCYTES NFR BLD: 0.3 K/UL (ref 0.1–0.95)
MONOCYTES NFR BLD: 3 % (ref 2–12)
NEUTROPHILS NFR BLD: 77 % (ref 43–80)
NEUTS SEG NFR BLD: 8.91 K/UL (ref 1.8–7.3)
NITRITE UR QL STRIP: NEGATIVE
PH UR STRIP: 8 [PH] (ref 5–8)
PH VENOUS: 7.46 (ref 7.35–7.45)
PLATELET # BLD AUTO: 400 K/UL (ref 130–450)
PMV BLD AUTO: 10.8 FL (ref 7–12)
POTASSIUM SERPL-SCNC: 4.2 MMOL/L (ref 3.5–5.1)
PROT SERPL-MCNC: 8.1 G/DL (ref 6.4–8.3)
PROT UR STRIP-MCNC: 100 MG/DL
RBC # BLD AUTO: 4.21 M/UL (ref 3.5–5.5)
RBC #/AREA URNS HPF: ABNORMAL /HPF
SODIUM SERPL-SCNC: 142 MMOL/L (ref 136–145)
SP GR UR STRIP: 1.01 (ref 1–1.03)
UROBILINOGEN UR STRIP-ACNC: 0.2 EU/DL (ref 0–1)
WBC #/AREA URNS HPF: ABNORMAL /HPF
WBC OTHER # BLD: 11.6 K/UL (ref 4.5–11.5)

## 2025-09-06 PROCEDURE — 6360000002 HC RX W HCPCS

## 2025-09-06 PROCEDURE — 82800 BLOOD PH: CPT

## 2025-09-06 PROCEDURE — 81001 URINALYSIS AUTO W/SCOPE: CPT

## 2025-09-06 PROCEDURE — 84703 CHORIONIC GONADOTROPIN ASSAY: CPT

## 2025-09-06 PROCEDURE — 83690 ASSAY OF LIPASE: CPT

## 2025-09-06 PROCEDURE — 82010 KETONE BODYS QUAN: CPT

## 2025-09-06 PROCEDURE — 93975 VASCULAR STUDY: CPT

## 2025-09-06 PROCEDURE — 85025 COMPLETE CBC W/AUTO DIFF WBC: CPT

## 2025-09-06 PROCEDURE — 82962 GLUCOSE BLOOD TEST: CPT

## 2025-09-06 PROCEDURE — 6370000000 HC RX 637 (ALT 250 FOR IP): Performed by: EMERGENCY MEDICINE

## 2025-09-06 PROCEDURE — 80053 COMPREHEN METABOLIC PANEL: CPT

## 2025-09-06 PROCEDURE — 76830 TRANSVAGINAL US NON-OB: CPT

## 2025-09-06 PROCEDURE — 83605 ASSAY OF LACTIC ACID: CPT

## 2025-09-06 PROCEDURE — 2580000003 HC RX 258

## 2025-09-06 RX ORDER — 0.9 % SODIUM CHLORIDE 0.9 %
500 INTRAVENOUS SOLUTION INTRAVENOUS ONCE
Status: DISCONTINUED | OUTPATIENT
Start: 2025-09-06 | End: 2025-09-06

## 2025-09-06 RX ORDER — HYDROCODONE BITARTRATE AND ACETAMINOPHEN 5; 325 MG/1; MG/1
1 TABLET ORAL ONCE
Status: COMPLETED | OUTPATIENT
Start: 2025-09-06 | End: 2025-09-06

## 2025-09-06 RX ORDER — 0.9 % SODIUM CHLORIDE 0.9 %
1000 INTRAVENOUS SOLUTION INTRAVENOUS ONCE
Status: COMPLETED | OUTPATIENT
Start: 2025-09-06 | End: 2025-09-06

## 2025-09-06 RX ORDER — ONDANSETRON 2 MG/ML
4 INJECTION INTRAMUSCULAR; INTRAVENOUS ONCE
Status: COMPLETED | OUTPATIENT
Start: 2025-09-06 | End: 2025-09-06

## 2025-09-06 RX ORDER — KETOROLAC TROMETHAMINE 15 MG/ML
15 INJECTION, SOLUTION INTRAMUSCULAR; INTRAVENOUS ONCE
Status: DISCONTINUED | OUTPATIENT
Start: 2025-09-06 | End: 2025-09-06

## 2025-09-06 RX ORDER — MORPHINE SULFATE 4 MG/ML
4 INJECTION, SOLUTION INTRAMUSCULAR; INTRAVENOUS ONCE
Status: COMPLETED | OUTPATIENT
Start: 2025-09-06 | End: 2025-09-06

## 2025-09-06 RX ORDER — DROPERIDOL 2.5 MG/ML
1.25 INJECTION, SOLUTION INTRAMUSCULAR; INTRAVENOUS ONCE
Status: DISCONTINUED | OUTPATIENT
Start: 2025-09-06 | End: 2025-09-06

## 2025-09-06 RX ADMIN — HYDROCODONE BITARTRATE AND ACETAMINOPHEN 1 TABLET: 5; 325 TABLET ORAL at 20:57

## 2025-09-06 RX ADMIN — SODIUM CHLORIDE 1000 ML: 0.9 INJECTION, SOLUTION INTRAVENOUS at 16:58

## 2025-09-06 RX ADMIN — MORPHINE SULFATE 4 MG: 4 INJECTION, SOLUTION INTRAMUSCULAR; INTRAVENOUS at 16:59

## 2025-09-06 RX ADMIN — ONDANSETRON 4 MG: 2 INJECTION, SOLUTION INTRAMUSCULAR; INTRAVENOUS at 16:58

## 2025-09-06 ASSESSMENT — PAIN SCALES - GENERAL
PAINLEVEL_OUTOF10: 10
PAINLEVEL_OUTOF10: 8

## 2025-09-06 ASSESSMENT — PAIN - FUNCTIONAL ASSESSMENT
PAIN_FUNCTIONAL_ASSESSMENT: 0-10
PAIN_FUNCTIONAL_ASSESSMENT: ACTIVITIES ARE NOT PREVENTED
PAIN_FUNCTIONAL_ASSESSMENT: 0-10
PAIN_FUNCTIONAL_ASSESSMENT: 0-10

## 2025-09-06 ASSESSMENT — PAIN DESCRIPTION - ORIENTATION: ORIENTATION: RIGHT;LEFT

## 2025-09-06 ASSESSMENT — PAIN DESCRIPTION - DESCRIPTORS: DESCRIPTORS: ACHING;SHARP

## 2025-09-06 ASSESSMENT — PAIN DESCRIPTION - LOCATION
LOCATION: ABDOMEN
LOCATION: ABDOMEN
LOCATION: ABDOMEN;BACK

## (undated) DEVICE — DOUBLE BASIN SET: Brand: MEDLINE INDUSTRIES, INC.

## (undated) DEVICE — READY WET SKIN SCRUB TRAY-LF: Brand: MEDLINE INDUSTRIES, INC.

## (undated) DEVICE — INTENDED FOR TISSUE SEPARATION, AND OTHER PROCEDURES THAT REQUIRE A SHARP SURGICAL BLADE TO PUNCTURE OR CUT.: Brand: BARD-PARKER ® STAINLESS STEEL BLADES

## (undated) DEVICE — BANDAGE,GAUZE,BULKEE II,4.5"X4.1YD,STRL: Brand: MEDLINE

## (undated) DEVICE — TOWEL,OR,DSP,ST,BLUE,STD,6/PK,12PK/CS: Brand: MEDLINE

## (undated) DEVICE — CURITY IDOFORM PACKING STRIP: Brand: CURITY

## (undated) DEVICE — BLADE CLIPPER GEN PURP NS

## (undated) DEVICE — PAD,ABDOMINAL,5"X9",ST,LF,25/BX: Brand: MEDLINE INDUSTRIES, INC.

## (undated) DEVICE — 4-PORT MANIFOLD: Brand: NEPTUNE 2

## (undated) DEVICE — STANDARD HYPODERMIC NEEDLE,POLYPROPYLENE HUB: Brand: MONOJECT

## (undated) DEVICE — Z DISCONTINUED NO SUB IDED DRAIN PENROSE L12IN 0.25IN USED TO PROMOTE DRNAGE IN OPN

## (undated) DEVICE — CONTAINER VACUTAINER ANAER CULTURE SWAB

## (undated) DEVICE — SWAB SPEC COLL SHFT L5.25IN POLYUR FOAM TIP SFT DBL MEDIA

## (undated) DEVICE — COVER HNDL LT DISP

## (undated) DEVICE — PATIENT RETURN ELECTRODE, SINGLE-USE, CONTACT QUALITY MONITORING, ADULT, WITH 9FT CORD, FOR PATIENTS WEIGING OVER 33LBS. (15KG): Brand: MEGADYNE

## (undated) DEVICE — PACK PROCEDURE SURG GEN CUST

## (undated) DEVICE — SPONGE LAP W18XL18IN WHT COT 4 PLY FLD STRUNG RADPQ DISP ST

## (undated) DEVICE — SHEET, T, LAPAROTOMY, STERILE: Brand: MEDLINE

## (undated) DEVICE — BAG SPECIMEN BIOHAZARD 6IN X 9IN

## (undated) DEVICE — GAUZE,SPONGE,4"X4",8PLY,STRL,LF,10/TRAY: Brand: MEDLINE

## (undated) DEVICE — TRAP SPEC MUCUS FOR SUCT

## (undated) DEVICE — GOWN,SIRUS,FABRNF,XL,20/CS: Brand: MEDLINE